# Patient Record
Sex: MALE | Race: WHITE | NOT HISPANIC OR LATINO | Employment: OTHER | URBAN - METROPOLITAN AREA
[De-identification: names, ages, dates, MRNs, and addresses within clinical notes are randomized per-mention and may not be internally consistent; named-entity substitution may affect disease eponyms.]

---

## 2017-01-10 ENCOUNTER — TELEPHONE (OUTPATIENT)
Dept: TRANSPLANT | Facility: CLINIC | Age: 57
End: 2017-01-10

## 2017-01-10 DIAGNOSIS — Z76.82 LUNG TRANSPLANT CANDIDATE: ICD-10-CM

## 2017-01-10 DIAGNOSIS — D86.9 SARCOIDOSIS: Primary | ICD-10-CM

## 2017-01-10 NOTE — LETTER
01/11/2017    Florentin Corona  [address]  [address]  [facility] [address]  Phone: [phone]  Fax: [phone]         Dear Dr. Florentin Corona    Patient: Martin Hendrix Jr.     MR Number: [id number]     YOB: 1960       Thank you for the referral of Martin Hendrix Jr. to our lung transplant program. An initial appointment with the transplant team has been scheduled for February 6, 2017. You will receive an after-visit summary following the completion of your patients appointment in our clinic.    Thank you again for your trust in our program. If there is anything we can do for you or your staff, please feel free to contact us at [phone].    Sincerely,         Darrick Wolfe MD   Director, Lung Transplantation   Pulmonary & Critical Care Medicine    [facility]  [address] [place] [id number]  [phone]

## 2017-01-10 NOTE — TELEPHONE ENCOUNTER
Dr. Corona's office has been trying to fax a referral since November.  We have not received it.  Vera stated that she has been trying to send it through the charting system.  Asked her to print out the needed info and manually fax it.  Received referral today.  Placed on Dr. Wolfe's desk for review.

## 2017-01-10 NOTE — TELEPHONE ENCOUNTER
Notified patient that records were received today and that we will submit to insurance for consult clearance.  Once that is obtained, I will be in touch with him regarding scheduling.  Patient was asked to obtain a CD of his CT scan done 11/3/2016 at [place] to bring with him to his consult appointment.  He stated that he would obtain it and bring it with him.

## 2017-02-06 ENCOUNTER — HOSPITAL ENCOUNTER (OUTPATIENT)
Dept: PULMONOLOGY | Facility: CLINIC | Age: 57
Discharge: HOME | End: 2017-02-06
Payer: COMMERCIAL

## 2017-02-06 ENCOUNTER — LAB VISIT (OUTPATIENT)
Dept: LAB | Facility: HOSPITAL | Age: 57
End: 2017-02-06
Attending: INTERNAL MEDICINE
Payer: COMMERCIAL

## 2017-02-06 ENCOUNTER — INITIAL CONSULT (OUTPATIENT)
Dept: TRANSPLANT | Facility: CLINIC | Age: 57
End: 2017-02-06
Payer: COMMERCIAL

## 2017-02-06 VITALS
BODY MASS INDEX: 34.51 KG/M2 | SYSTOLIC BLOOD PRESSURE: 131 MMHG | TEMPERATURE: 97 F | HEART RATE: 111 BPM | DIASTOLIC BLOOD PRESSURE: 83 MMHG | RESPIRATION RATE: 20 BRPM | WEIGHT: 233 LBS | HEIGHT: 69 IN | OXYGEN SATURATION: 92 %

## 2017-02-06 VITALS — WEIGHT: 233 LBS | HEIGHT: 69 IN | BODY MASS INDEX: 34.51 KG/M2

## 2017-02-06 DIAGNOSIS — Z76.82 LUNG TRANSPLANT CANDIDATE: ICD-10-CM

## 2017-02-06 DIAGNOSIS — D86.9 SARCOIDOSIS: ICD-10-CM

## 2017-02-06 DIAGNOSIS — Z76.82 LUNG TRANSPLANT CANDIDATE: Primary | ICD-10-CM

## 2017-02-06 LAB
AMPHET+METHAMPHET UR QL: NEGATIVE
BARBITURATES UR QL SCN>200 NG/ML: NEGATIVE
BENZODIAZ UR QL SCN>200 NG/ML: NEGATIVE
BZE UR QL SCN: NEGATIVE
CANNABINOIDS UR QL SCN: NEGATIVE
CREAT UR-MCNC: 148 MG/DL
ETHANOL UR-MCNC: <10 MG/DL
METHADONE UR QL SCN>300 NG/ML: NEGATIVE
OPIATES UR QL SCN: NEGATIVE
PCP UR QL SCN>25 NG/ML: NEGATIVE
PRE FEV1 FVC: 59
PRE FEV1: 1.05
PRE FVC: 1.78
PREDICTED FEV1 FVC: 81
PREDICTED FEV1: 3.57
PREDICTED FVC: 4.39
TOXICOLOGY INFORMATION: NORMAL

## 2017-02-06 PROCEDURE — 99999 PR PBB SHADOW E&M-EST. PATIENT-LVL III: CPT | Mod: PBBFAC,,, | Performed by: INTERNAL MEDICINE

## 2017-02-06 PROCEDURE — 94729 DIFFUSING CAPACITY: CPT | Mod: S$GLB,,, | Performed by: INTERNAL MEDICINE

## 2017-02-06 PROCEDURE — 80307 DRUG TEST PRSMV CHEM ANLYZR: CPT

## 2017-02-06 PROCEDURE — 80323 ALKALOIDS NOS: CPT

## 2017-02-06 PROCEDURE — 94727 GAS DIL/WSHOT DETER LNG VOL: CPT | Mod: S$GLB,,, | Performed by: INTERNAL MEDICINE

## 2017-02-06 PROCEDURE — 99203 OFFICE O/P NEW LOW 30 MIN: CPT | Mod: 25,S$GLB,, | Performed by: INTERNAL MEDICINE

## 2017-02-06 PROCEDURE — 94620 PR PULMONARY STRESS TESTING,SIMPLE: CPT | Mod: S$GLB,,, | Performed by: INTERNAL MEDICINE

## 2017-02-06 PROCEDURE — 94010 BREATHING CAPACITY TEST: CPT | Mod: 59,S$GLB,, | Performed by: INTERNAL MEDICINE

## 2017-02-06 RX ORDER — FLUTICASONE PROPIONATE AND SALMETEROL 500; 50 UG/1; UG/1
1 POWDER RESPIRATORY (INHALATION) 2 TIMES DAILY
Status: ON HOLD | COMMUNITY
End: 2017-08-30 | Stop reason: ALTCHOICE

## 2017-02-06 RX ORDER — ALBUTEROL SULFATE 2.5 MG/.5ML
2.5 SOLUTION RESPIRATORY (INHALATION) 3 TIMES DAILY PRN
Status: ON HOLD | COMMUNITY
Start: 2013-03-29 | End: 2017-08-30 | Stop reason: ALTCHOICE

## 2017-02-06 RX ORDER — FUROSEMIDE 40 MG/1
1 TABLET ORAL DAILY
Refills: 2 | Status: ON HOLD | COMMUNITY
Start: 2017-01-05 | End: 2017-08-30 | Stop reason: ALTCHOICE

## 2017-02-06 RX ORDER — ALBUTEROL SULFATE 90 UG/1
1 AEROSOL, METERED RESPIRATORY (INHALATION) EVERY 6 HOURS PRN
Refills: 11 | Status: ON HOLD | COMMUNITY
Start: 2017-01-12 | End: 2017-08-30 | Stop reason: ALTCHOICE

## 2017-02-06 RX ORDER — ACETAMINOPHEN 325 MG/1
650 TABLET ORAL EVERY 6 HOURS PRN
Status: ON HOLD | COMMUNITY
Start: 2013-03-29 | End: 2017-08-30 | Stop reason: ALTCHOICE

## 2017-02-06 RX ORDER — METHOTREXATE 2.5 MG/1
10 TABLET ORAL WEEKLY
Status: ON HOLD | COMMUNITY
End: 2017-08-30 | Stop reason: ALTCHOICE

## 2017-02-06 RX ORDER — POTASSIUM CHLORIDE 750 MG/1
1 TABLET, EXTENDED RELEASE ORAL DAILY
Refills: 2 | Status: ON HOLD | COMMUNITY
Start: 2017-01-05 | End: 2017-08-30 | Stop reason: ALTCHOICE

## 2017-02-06 RX ORDER — MULTIVITAMIN
1 TABLET ORAL DAILY
Status: ON HOLD | COMMUNITY
End: 2019-03-13 | Stop reason: HOSPADM

## 2017-02-06 NOTE — PROGRESS NOTES
Discussed patient attending support group meeting next week.  Pt is unsure of whether or not he wants to pursue transplant at this time.  I will call him next week to touch base.  Pt was given a pre-transplant education binder and relocation/caregiver requirements were discussed.  Pt states that he had a cscope at [place] by Dr. Gunderson within the last 10 years and the recommendation was to repeat at 10 years.  RANGEL signed to obtain records.

## 2017-02-06 NOTE — LETTER
February 7, 2017        Florentin Corona  [address]  [address]  [facility] [address]  Phone: [phone]  Fax: [phone]             [facility]  [address]  [facility] [address]  Phone: [phone]   Patient: Martin Hendrix Jr.   MR Number: [id number]   YOB: 1960   Date of Visit: 2/6/2017       Dear Dr. Florentin Corona    Thank you for referring Martin Hendrix to me for evaluation. Attached you will find relevant portions of my assessment and plan of care.    If you have questions, please do not hesitate to call me. I look forward to following Martin Hendrix along with you.    Sincerely,    Darrick Wolfe MD    Enclosure    If you would like to receive this communication electronically, please contact [email or web address] or [phone] to request [employer] access.    [employer] is a tool which provides read-only access to select patient information with whom you have a relationship. Its easy to use and provides real time access to review your patients record including encounter summaries, notes, results, and demographic information.    If you feel you have received this communication in error or would no longer like to receive these types of communications, please e-mail [email or web address]

## 2017-02-06 NOTE — PROCEDURES
Martin Hendrix Jr. is a 56 y.o.  male patient, who presents for a 6 minute walk test ordered by MD. Yaneth.  The diagnosis is Pre Lung Transplant Evaluation.  The patient's BMI is 35 kg/m2.  Predicted distance (lower limit of normal) is 415.89 meters.      Test Results:    The test was completed without stopping. The total time walked was 360 seconds.  During walking, the patient reported:  Dyspnea. The patient used no assistive devices during testing.     02/06/2017---------Distance: 329.18 meters (1080 feet)     O2 Sat % Supplemental Oxygen Heart Rate Blood Pressure Eugenio Scale   Pre-exercise  (Resting) 95 % 6 L/M 109 bpm 121/88 mmHg 0   During Exercise 80 % 6 L/M 146 bpm 133/88 mmHg 4   Post-exercise  (Recovery) 93 % 6 L/M  122 bpm   mmHg       Recovery Time: 195 seconds    Performing nurse/tech: TOO Monzon      PREVIOUS STUDY:   The patient has not had a previous study.      CLINICAL INTERPRETATION:  Six minute walk distance is 329.18 meters (1080 feet) with somewhat heavy dyspnea.  During exercise, there was significant desaturation while breathing supplemental oxygen.  Blood pressure remained stable and Heart rate increased significantly with walking.  Tachycardia was present prior to exercise.  This may represent a tachycardic response to exercise.  The patient did not report non-pulmonary symptoms during exercise.  No previous study performed.  Based upon age and body mass index, exercise capacity is less than predicted.

## 2017-02-06 NOTE — PROGRESS NOTES
LUNG TRANSPLANT INITIAL EVALUATION                                                                                                                                             Reason for Visit:  Evaluation for lung transplant    Referring Physician: Florentin Corona MD    History of Present Illness: Martin Hendrix Jr. is a 56 y.o. male with past medical history of sarcoidosis, KRISTYN, and pulmonary hypertension.  He is on 3L of oxygen.  He is on BIPAP.  His New York Heart Association Class is III  and a Karnofsky score of 70. Cares for self; unable to carry on normal activity or active work.  He is not diabetic.     Previous work up review  Sarcoidosis  PFTs:  FEV1 30%, TLC 47%, and DLCO 44%   Diagnosed in 2004 by biopsy   Currently on  On MTX 10 mg/week for long time, Advair bid, and Albuterol prn  On oxygen for last 2 months (worsening SOB)    Pulmonary HTN  TTE done on November 2016 shoed systolic PAP of 96 mmHg, EF 40-45%, RV mod-severe dilate, and diastolic dysfunction grade III.  No current treatment.  Lasix for supportive    KRISTYN AHI 9 with taina sat 74%  On BPAP at home with oxygen 5 LPMs    Flu shot and pneumonia shot done    RHC/LHC: not done    Never smoking    No past medical history on file.  Colonoscopy Results: done in the past no result with him today    No past surgical history on file.  [place]: None    Blood Product Transfusions: no    Allergies: Review of patient's allergies indicates not on file.    No current outpatient prescriptions on file.     No current facility-administered medications for this visit.          There is no immunization history on file for this patient.  Family History:  No family history on file.  History   Alcohol use Not on file      History   Drug Use Not on file      Social History     Social History    Marital status:      Spouse name: N/A    Number of children: N/A    Years of education: N/A     Occupational History    Not on file.     Social History Main  "Topics    Smoking status: Not on file    Smokeless tobacco: Not on file    Alcohol use Not on file    Drug use: Not on file    Sexual activity: Not on file     Other Topics Concern    Not on file     Social History Narrative     Review of Systems   Constitutional: Negative for chills and fever.   HENT: Negative for hearing loss.    Eyes: Negative for blurred vision, double vision, photophobia and pain.   Respiratory: Positive for cough. Negative for hemoptysis, sputum production, shortness of breath and wheezing.    Cardiovascular: Positive for orthopnea, leg swelling and PND. Negative for chest pain and palpitations.   Gastrointestinal: Negative for heartburn, nausea and vomiting.   Genitourinary: Negative for dysuria and urgency.   Skin: Negative for itching and rash.   Neurological: Negative for dizziness, tingling and headaches.   Endo/Heme/Allergies: Negative for environmental allergies. Does not bruise/bleed easily.   Psychiatric/Behavioral: Negative for depression, memory loss and suicidal ideas. The patient does not have insomnia.      Vitals  Visit Vitals    /83 (BP Location: Left arm, Patient Position: Sitting, BP Method: Automatic)    Pulse (!) 111    Temp 97.3 °F (36.3 °C) (Oral)    Resp 20    Ht 5' 9" (1.753 m)    Wt 105.7 kg (233 lb)    SpO2 (!) 92%    BMI 34.41 kg/m2     Physical Exam   Constitutional: He is oriented to person, place, and time and well-developed, well-nourished, and in no distress. No distress.   HENT:   Head: Normocephalic and atraumatic.   Eyes: Conjunctivae are normal. Pupils are equal, round, and reactive to light.   Neck: Normal range of motion. Neck supple. No tracheal deviation present. No thyromegaly present.   Cardiovascular: Normal rate, regular rhythm, normal heart sounds and intact distal pulses.    Loud P2 positive   Abdominal: Bowel sounds are normal.   Musculoskeletal: He exhibits edema.   Neurological: He is alert and oriented to person, place, and " time. No cranial nerve deficit. Gait normal. GCS score is 15.   Skin: Skin is warm and dry. He is not diaphoretic.       Labs:  Lab Visit on 02/06/2017   Component Date Value    Alcohol, Urine 02/06/2017 <10     Benzodiazepines 02/06/2017 Negative     Methadone metabolites 02/06/2017 Negative     Cocaine (Metab.) 02/06/2017 Negative     Opiate Scrn, Ur 02/06/2017 Negative     Barbiturate Screen, Ur 02/06/2017 Negative     Amphetamine Screen, Ur 02/06/2017 Negative     THC 02/06/2017 Negative     Phencyclidine 02/06/2017 Negative     Creatinine, Random Ur 02/06/2017 148.0     Toxicology Information 02/06/2017 SEE COMMENT        No flowsheet data found.  Imaging:  CT chest result from OSH   1. Resolution of right upper lobe centrilobular nodules.  2. Scattered bilateral groundglass opacities which may reflect air trapping or pneumonitis. No other detrimental change.  3. Calcified mediastinal and hilar lymph nodes presumably related to patient's history of sarcoidosis.  4. Stable areas of parenchymal and interstitial scarring.  5. Enlargement of the main pulmonary artery which can be seen with pulmonary arterial hypertension.    Cardiodiagnostics:  TTE: OSH    Interpretation Summary  Ejection Fraction = 40-45%.  RSVP= 96 mm/hg  Left ventricular systolic function is mildly reduced.  A variety of Doppler measurements indicate impaired left ventricular  relaxation, which is associated with grade I/IV or mild diastolic dysfunction.  The right ventricle is moderate to severely dilated.  There is mild mitral regurgitation.  Right ventricular systolic pressure is elevated at >60mmHg.    Assessment:  1. Lung transplant candidate    2. Sarcoidosis    3. Secondary pulmonary hypertension        2-6-2017  6MWT:  329.4 M (1080 F)  Resting oxygen sat 95% on oxygen Desatuartion to 80% during walk   Recovery time 195 sec  HR  146/min end of walk  PFTs:  FEV1 30%, TLC 47%, and DLCO 44%     Plan:     1. Patient  is candidate for the lung transplant base on   Sarcoidosis with severe restrictive/obstructive lung disease  PH related with sarcoidosis  - Will work him up for lung transplant  - Send for support group   - SW evaluation    2. Sarcoidosis  On MTX, Advair, and albuterol will continue    3. Severe pulmonary hypertension Fc III  Likely group 5 from sarcoidosis  Plan   - Send to follow up with pulmonary HTN service   - He may need to try treatment with Sildenafil or other medication    - May need more cardiac work up with R/LHC    4. KRISTYN   On BiPAP and oxygen will continue    5. Follow up in 3 months    Tristin Kenny MD    Pulmonary Critical Care Fellow   [facility] PGY-VI  Pager [phone]    Attending Note:    I have seen and evaluated the patient with the fellow. Their note reflects the content of our discussion and my plan of care. Will start workup for lung transplant. Encouraged to lose weight. I would advise a trial of sildenafil or tadalafil for his PH. RTC during the workup.    Thank you for allowing me to participate in the care of your patient. Please, do not hesitate to contact me if you have any questions.      Darrick Wolfe MD  Medical Director of Lung Transplantation  [facility]

## 2017-02-08 ENCOUNTER — TELEPHONE (OUTPATIENT)
Dept: TRANSPLANT | Facility: CLINIC | Age: 57
End: 2017-02-08

## 2017-02-08 DIAGNOSIS — I27.9 CHRONIC PULMONARY HEART DISEASE: ICD-10-CM

## 2017-02-08 DIAGNOSIS — R06.82 TACHYPNEA: ICD-10-CM

## 2017-02-08 DIAGNOSIS — Z79.899 POLYPHARMACY: Primary | ICD-10-CM

## 2017-02-08 LAB
ANABASINE UR-MCNC: <2 NG/ML
COTININE UR-MCNC: <5 NG/ML
NICOTINE UR-MCNC: <2 NG/ML
NORNICOTINE UR-MCNC: <2 NG/ML

## 2017-02-08 NOTE — TELEPHONE ENCOUNTER
Notified patient that Dr. Wolfe and Dr. Higuera discussed the patient and would like for him to have a R and LHC and then meet with Dr. Higuera afterwards to discuss possibly PH therapy.  Explained that he would meet with Dr. Sharma on 2/14, schedule his heart cath, and then depending on when he has his heart cath, will see Dr. Higuera soon afterwards.  Pt verbalized understanding.

## 2017-02-08 NOTE — TELEPHONE ENCOUNTER
----- Message from Darrick Wolfe MD sent at 2/7/2017  3:03 PM CST -----  Sounds good. Thanks    ----- Message -----     From: Shanell Higuera MD     Sent: 2/7/2017   2:49 PM       To: Darrick Wolfe MD, #    Gaurang Cj called me about this santhosh today    Lets go ahead and get the R/LHC and once I have hemodynamics I can start him on pulm VD tx. Will ask my PH team to put him on my books for clinic following the cath    S

## 2017-02-13 DIAGNOSIS — Z76.82 LUNG TRANSPLANT CANDIDATE: Primary | ICD-10-CM

## 2017-02-14 ENCOUNTER — INITIAL CONSULT (OUTPATIENT)
Dept: CARDIOLOGY | Facility: CLINIC | Age: 57
End: 2017-02-14
Payer: COMMERCIAL

## 2017-02-14 ENCOUNTER — DOCUMENTATION ONLY (OUTPATIENT)
Dept: CARDIOLOGY | Facility: CLINIC | Age: 57
End: 2017-02-14

## 2017-02-14 VITALS
OXYGEN SATURATION: 93 % | BODY MASS INDEX: 32.53 KG/M2 | HEART RATE: 99 BPM | HEIGHT: 71 IN | WEIGHT: 232.38 LBS | DIASTOLIC BLOOD PRESSURE: 87 MMHG | SYSTOLIC BLOOD PRESSURE: 135 MMHG

## 2017-02-14 DIAGNOSIS — D86.9 SARCOIDOSIS: ICD-10-CM

## 2017-02-14 DIAGNOSIS — N39.0 URINARY TRACT INFECTION WITHOUT HEMATURIA, SITE UNSPECIFIED: Primary | ICD-10-CM

## 2017-02-14 DIAGNOSIS — Z76.82 LUNG TRANSPLANT CANDIDATE: ICD-10-CM

## 2017-02-14 DIAGNOSIS — J96.11 CHRONIC RESPIRATORY FAILURE WITH HYPOXIA: Primary | ICD-10-CM

## 2017-02-14 PROCEDURE — 99204 OFFICE O/P NEW MOD 45 MIN: CPT | Mod: S$GLB,,, | Performed by: INTERNAL MEDICINE

## 2017-02-14 PROCEDURE — 99999 PR PBB SHADOW E&M-EST. PATIENT-LVL III: CPT | Mod: PBBFAC,,, | Performed by: INTERNAL MEDICINE

## 2017-02-14 RX ORDER — DIPHENHYDRAMINE HCL 25 MG
50 CAPSULE ORAL ONCE
Status: CANCELLED | OUTPATIENT
Start: 2017-02-14 | End: 2017-02-14

## 2017-02-14 RX ORDER — SODIUM CHLORIDE 9 MG/ML
3 INJECTION, SOLUTION INTRAVENOUS CONTINUOUS
Status: CANCELLED | OUTPATIENT
Start: 2017-02-14 | End: 2017-02-14

## 2017-02-14 NOTE — PROGRESS NOTES
Interventional Cardiology Consult Note                                                                                                                                       Referring Physician: Dr. Wolfe  Reason for Consult:  RHC/LHC as part of lung transplant eval    History of Present Illness: Patient is a pleasant 56 year old male with past medical history significant for sarcoidosis, KRISTYN, and pulmonary hypertension who is referred by Dr. Wolfe for RHC/LHC as part of transplant evaluation.  Patient currently on 3 L NC, feels SOB has been worsening over past several months.  Denies any chest pain, denies syncope or lightheadedness.  Does complain of MARTINEZ that has been worsening.  Complains of lower extremity edema.    11/2016 TTE   EF = 40-45%.  RSVP= 96 mm/hg  Left ventricular systolic function is mildly reduced.  There is mild mitral regurgitation.  Right ventricular systolic pressure is elevated at >60mmHg.    Past Medical History   Diagnosis Date    KRISTYN on CPAP     Osteopenia     Pulmonary hypertension     Sarcoidosis     Shortness of breath        Past Surgical History   Procedure Laterality Date    Abdominal surgery       6 weeks old    Cholecystectomy      Lung biopsy      Chest tube insertion      Bronchoscopy         Allergies: Review of patient's allergies indicates no known allergies.    Current Outpatient Prescriptions   Medication Sig    acetaminophen (TYLENOL) 325 MG tablet Take 650 mg by mouth every 6 (six) hours as needed.    albuterol sulfate 2.5 mg/0.5 mL Nebu Inhale 2.5 mg into the lungs 3 (three) times daily as needed.    fluticasone-salmeterol 500-50 mcg/dose (ADVAIR DISKUS) 500-50 mcg/dose DsDv diskus inhaler Inhale 1 puff into the lungs 2 (two) times daily.    furosemide (LASIX) 40 MG tablet Take 1 tablet by mouth once daily.    methotrexate 2.5 MG Tab Take 10 mg by mouth once a week.    multivitamin (THERAGRAN) per tablet Take 1 tablet by mouth once daily.    potassium  "chloride SA (K-DUR,KLOR-CON) 10 MEQ tablet Take 1 tablet by mouth once daily.    PROAIR HFA 90 mcg/actuation inhaler Inhale 1 puff into the lungs every 6 (six) hours as needed.     No current facility-administered medications for this visit.          There is no immunization history on file for this patient.  Family History:    Family History   Problem Relation Age of Onset    Hypertension Mother     Diabetes Father     Kidney disease Sister     Diabetes Brother      History   Alcohol Use No      History   Drug Use No      Social History     Social History    Marital status:      Spouse name: N/A    Number of children: N/A    Years of education: N/A     Occupational History    Not on file.     Social History Main Topics    Smoking status: Never Smoker    Smokeless tobacco: Not on file    Alcohol use No    Drug use: No    Sexual activity: Not on file     Other Topics Concern    Not on file     Social History Narrative     Review of Systems   Constitutional: Negative for chills and fever.   HENT: Negative for hearing loss.    Eyes: Negative for blurred vision, double vision, photophobia and pain.   Respiratory: Positive for cough. Negative for hemoptysis, sputum production, shortness of breath and wheezing.    Cardiovascular: Positive for orthopnea, leg swelling and PND. Negative for chest pain, palpitations and claudication.   Gastrointestinal: Negative for heartburn, nausea and vomiting.   Genitourinary: Negative for dysuria and urgency.   Skin: Negative for itching and rash.   Neurological: Negative for dizziness, tingling, focal weakness and headaches.   Endo/Heme/Allergies: Negative for environmental allergies. Does not bruise/bleed easily.   Psychiatric/Behavioral: Negative for depression.     Vitals  Visit Vitals    /87  Comment: LT    Pulse 99    Ht 5' 11" (1.803 m)    Wt 105.4 kg (232 lb 5.8 oz)    SpO2 (!) 93%    BMI 32.41 kg/m2     Physical Exam   Constitutional: He is " oriented to person, place, and time and well-developed, well-nourished, and in no distress. No distress.   HENT:   Head: Normocephalic and atraumatic.   Eyes: Conjunctivae are normal. Pupils are equal, round, and reactive to light.   Neck: Normal range of motion. Neck supple. No tracheal deviation present. No thyromegaly present.   Cardiovascular: Normal rate, regular rhythm, normal heart sounds and intact distal pulses.    Pulmonary/Chest: He has wheezes.   Abdominal: Bowel sounds are normal.   Musculoskeletal: He exhibits edema.   Neurological: He is alert and oriented to person, place, and time. No cranial nerve deficit. Gait normal. GCS score is 15.   Skin: Skin is warm and dry. He is not diaphoretic.         Assessment/Plan:  1. Lung transplant candidate - LHC/RHC as part of evaluation for transplant.  Right radial access and right brachial access.  Patient understands risks and benefits and gives consent.   2. secondary pulmonary hypertension - Group 5 PH secondary to sarcoidosis, scheduled to see Dr. Higuera   3. Sarcoidosis      Plan:  RHC: 18 gauge antecubital vein IV for RHC  [place]  Patient will need vasoreactivity testing     LHC:  Right radial access  Presley catheter  Pigtail catheter for LV gram  J wire

## 2017-02-14 NOTE — LETTER
February 16, 2017      Shanell Higuera MD  [address]  [facility] [address]           [facility]  [address]  [facility] [address]  Phone: [phone]          Patient: Martin Hendrix Jr.   MR Number: [id number]   YOB: 1960   Date of Visit: 2/14/2017       Dear Dr. Shanell Higuera:    Thank you for referring Martin Hendrix to me for evaluation. Attached you will find relevant portions of my assessment and plan of care.    If you have questions, please do not hesitate to call me. I look forward to following Martin Hendrix along with you.    Sincerely,    Sukhi Sharma MD    Enclosure  CC:  No Recipients    If you would like to receive this communication electronically, please contact [email or web address] or [phone] to request more information on [employer] access.    For providers and/or their staff who would like to refer a patient to [facility], please contact us through our one-stop-shop provider referral line, [facility], at [phone].    If you feel you have received this communication in error or would no longer like to receive these types of communications, please e-mail [email or web address]

## 2017-02-14 NOTE — PROGRESS NOTES
OUTPATIENT CATHETERIZATION INSTRUCTIONS    You have been scheduled for a procedure in the catheterization lab on Friday, February 17, 2017.     Please report to the Cardiology Waiting Area on the Third floor of the hospital and check in at 9 AM.   You will then be taken to the SSCU (Short Stay Cardiac Unit) and prepared for your procedure. Please be aware that this is not the time of your procedure but the time you are to arrive. The procedures are scheduled on an hourly basis; however, emergency cases take precedence over all other cases.       You may not have anything to eat or drink after midnight the night before your test. You may take your regular morning medications with water. If there are any medications that you should not take you will be instructed to hold them that morning. If you are diabetic and on Metformin (Glucophage) do not take it the day before, the day of, and for 2 days after your procedure.      The procedure will take 1-2 hours to perform. After the procedure, you will return to SSCU on the third floor of the hospital. You will need to lie still (or keep your arm still) for the next 4 to 6 hours to minimize bleeding from the puncture site. Your family may remain in the room with you during this time.       You may be able to be discharged home that same afternoon if there is someone to drive you home and there were no complications. If you have one of the balloon, stent, or device procedures you may spend the night in the hospital. Your doctor will determine, based on your progress, the date and time of your discharge. The results of your procedure will be discussed with you before you are discharged. Any further testing or procedures will be scheduled for you either before you leave or you will be called with these appointments.       If you should have any questions, concerns, or need to change the date of your procedure, please call  EVELIO Aguirre @ [phone]    Special  Instructions:  Drink plenty of water the day before procedure        THE ABOVE INSTRUCTIONS WERE GIVEN TO THE PATIENT VERBALLY AND THEY VERBALIZED UNDERSTANDING.  THEY DO NOT REQUIRE ANY SPECIAL NEEDS AND DO NOT HAVE ANY LEARNING BARRIERS.          Directions for Reporting to Cardiology Waiting Area in the Hospital  If you park in the Parking Garage:  Take elevators to the1st floor of the parking garage.  Continue past the gift shop, coffee shop, and piano.  Take a right and go to the gold elevators. (Elevator B)  Take the elevator to the 3rd floor.  Follow the arrow on the sign on the wall that says Cath Lab Registration/EP Lab Registration.  Follow the long hallway all the way around until you come to a big open area.  This is the registration area.  Check in at Reception Desk.    OR    If family is dropping you off:  Have them drop you off at the front of the Hospital under the green overhang.  Enter through the doors and take a right.  Take the E elevators to the 3rd floor Cardiology Waiting Area.  Check in at the Reception Desk in the waiting room.

## 2017-02-15 NOTE — PATIENT INSTRUCTIONS
Heart Disease Education    The heart beats 60 to 100 times per minute, 24 hours a day. This equals almost 1000,000 times a day. It pumps blood with oxygen and nutrients to the tissues and organs of the body. But the heart is a muscle and needs its own supply of blood. Blood flow to the heart is supplied by the coronary arteries. Coronary artery disease (atherosclerosis) is a result of cholesterol, saturated fat, and calcium deposits (plaques) that build up inside the walls. This causes inflammation within the coronary arteries. These plaques narrow the artery and reduce blood flow to the heart muscle. The reduction in blood flow to the heart muscle decreases oxygen supply to the heart. If the narrowing is significant enough, the oxygen supply to one or more regions of the heart can be temporarily or permanently shut down. This can cause chest pain, and possibly death of heart tissue (heart attack).  Types of chest pain  Angina is the name for pain in the heart muscle. Angina is a warning sign of serious heart disease. When untreated it can lead to a heart attack, also known as acute myocardial infarction, or AMI. Angina occurs when there is not enough blood and oxygen flowing to the heart for the amount of work it is doing. This most often happens during physical exertion, when the heart is working hardest. It is usually relieved by rest or nitroglycerin. Angina may also occur after a large meal when extra blood is sent to the digestive organs and less goes to the heart. In the case of advanced or unstable heart disease, angina can occur at rest or awaken you from sleep. Angina usually lasts from a few minutes up to 20 minutes or more. When treated early, the effects of angina can be reversed without permanent damage to the heart. Angina is a serious condition and needs to be evaluated by a medical professional immediately.  There are two types of angina -- stable and unstable:  · Stable angina usually occurs  with a predictable level of activity. Being stable, its character, severity, and occurrence do not change much over time. It usually starts with activity, and resolves with rest or taking your medicine as instructed by your doctor. The symptoms usually do not last long.  · Unstable angina changes or gets worse over time. It is different from whatever you are used to. It may feel different or worse, begin without cause, occur with exercise or exertion, wake you up from sleep, and last longer. It may not respond in the same way as it does when you take your usual medicines for an attack. This type of angina can be a warning sign of an impending heart attack.     A heart attack is usually the result of a blood clot that suddenly forms in a coronary artery that has been narrowed with plaque. When this occurs, blood flow may be cut off to a part of the heart muscle, causing the cells to die. This weakens the pumping action of the heart, which affects the delivery of blood to all the other organs in the body including the brain. This damage is not reversible. However, early treatment can limit the amount of damage.  The pain you feel with angina and a heart attack may have a similar quality. However, it is usually different in intensity and duration. Here are some typical descriptions of a heart attack:  · It is most often experienced as a squeezing, crushing, pressure-like sensation in the center of the chest.  · It is sometimes described as something heavy sitting on my chest.  · It may feel more like a bad case of indigestion.  · The pain may spread from the chest to the arm, shoulder, throat or jaw.  · Sometimes the pain is not felt in the chest at all, but only in the arm, shoulder, throat or jaw.  · There may also be nausea, vomiting, dizziness or light-headedness, sweating and trouble breathing.  · Palpitations, or your heart beating rapidly  · A new, irregular heart beat  · Unexplained weakness  You may not be  "able to tell the difference between "bad" angina and a heart attack at home. Seek help if your symptoms are different than usual. Do not be in denial or just try to "tough it out."  Call 911  This is the fastest and safest way to get to the emergency department. The paramedics can also start treatment on the way to the hospital, saving valuable time for your heart.  · If the angina gets worse, if it continues, or if it stops and returns, call 911 immediately. Do not delay. You may be having a heart attack.  · After you call 911, take a second tablet or spray unless instructed otherwise. When repeating doses, sit down if possible, because it can make you feel lightheaded or dizzy. Wait another 5 minutes. If the angina still does not go away, take a third tablet or spray. Do not take more than 3 tablets or sprays within 15 minutes. Stay on the phone with 911 for further instruction.  · Your healthcare provider may give you slightly different instructions than those above. If so, follow them carefully.  Do not wait until symptoms become severe to call 911.  Other reasons to call 911 include:  · Trouble breathing  · Feeling lightheaded, faint, or dizzy  · Rapid heart beat  · Slower than usual heart rate compared to your normal  · Angina with weakness, dizziness, fainting, heavy sweating, nausea, or vomiting  · Extreme drowsiness, confusion  · Weakness of an arm or leg or one side of the face  · Difficulty with speech or vision  When to seek medical care  Remember, the signs and symptoms of a heart attack are not always like they are on TV. Sometimes they are not so obvious. You may only feel weak, or just not right. If it is not clear or if you have any doubt, call for advice.  · Seek help if there is a change in the type of pain, if it feels different, or if your symptoms are mild.  · Do not drive yourself. Have someone else drive you. If no one can drive, call 911.  · Do not delay. Fast diagnosis and treatment can " "prevent or limit the amount of heart damage during a heart attack.  · Do not go to your doctor's office or a clinic as they may not be able to provide all the testing and treatment required for this condition.  · If your doctor has given you medicine to take when symptoms occur, take them but don't delay getting help trying to locate medicines.  What happens in the emergency department  The emergency department is connected to your local emergency medical system (EMS) through 911. That's why during a cardiac emergency, calling 911 is the fastest way to get help. The goal of the emergency department is to rapidly screen, evaluate, and treat people.  Once you are there, an electrocardiogram (ECG or heart tracing) will be done. Blood samples may be taken to look for the presence of heart enzymes that leak from damaged heart cells and show if a heart attack is occurring. You will often be evaluated by a heart specialist (cardiologist) who decides the best course of action. In the case of severe angina or early heart attack, and depending on the circumstances, powerful "clot busting" medicines can be used to dissolve blood clots in the coronary artery. In other cases, you may be taken to a cardiac catheterization lab. Here, a tiny balloon-tipped catheter is advanced through blood vessels to the heart. There the balloon is inflated pushing open the blood vessel restoring blood flow.  Risk factors for heart disease  Risk factors for heart disease are a combination of genetic and lifestyle. Many risk factors work by either directly or indirectly damaging the blood vessels of the heart, or by increasing the risk of forming blood or cholesterol clots, which then clog up and block the arteries.     Examples of physical lifestyle risk factors:  · Cigarette smoking  · High blood pressure  · High blood cholesterol  · Use of stimulant drugs such as cocaine, crack, and amphetamines  · Eating a high-fat, high-cholesterol " meal  · Diabetes   · Obesity which increases risk for diabetes and high blood pressure  · Lack of regular physical activity     Examples of emotional lifestyle factors:  · Chronic high stress levels release stress hormones. These raise blood pressure and cholesterol level and makes blood clot more easily.  · Held-in anger, hostile or cynical attitude  · Social and emotional isolation, lack of intimacy  · Loss of relationship  · Depression  Other factors that increase the risk of heart attack that you cannot control :  · Age. The older you get beyond 40, the greater is your risk of significant coronary artery disease.  · Gender. More men than women get heart disease; but once past menopause, women who are not taking estrogen replacement have the same risk as men for a heart attack.  · Family history. If your mother, father, brother or sister has coronary artery disease, your risk of having it is higher than a person your age without this family history.  What can you do to decrease your risk  To reduce your risk of heart disease:  · Get regular checkups with your doctor.  · Take your medicines for blood pressure, cholesterol or diabetes as directed.  · Watch your diet. Eat a heart healthy diet choosing fresh foods, less salt, cholesterol, and fat  · Stop smoking. Get help if needed.  · Get regular exercise.  · Manage stress.  · Carry a list of medicines and doses in your wallet.  Date Last Reviewed: 12/30/2015  © [employer]. [address] [place] [address], [place] [address]. All rights reserved. This information is not intended as a substitute for professional medical care. Always follow your healthcare professional's instructions.

## 2017-02-17 ENCOUNTER — HOSPITAL ENCOUNTER (OUTPATIENT)
Facility: HOSPITAL | Age: 57
Discharge: HOME | End: 2017-02-17
Attending: INTERNAL MEDICINE | Admitting: INTERNAL MEDICINE
Payer: COMMERCIAL

## 2017-02-17 VITALS
OXYGEN SATURATION: 97 % | SYSTOLIC BLOOD PRESSURE: 138 MMHG | HEIGHT: 71 IN | RESPIRATION RATE: 22 BRPM | HEART RATE: 96 BPM | TEMPERATURE: 99 F | BODY MASS INDEX: 32.2 KG/M2 | DIASTOLIC BLOOD PRESSURE: 88 MMHG | WEIGHT: 230 LBS

## 2017-02-17 DIAGNOSIS — J96.11 CHRONIC RESPIRATORY FAILURE WITH HYPOXIA: Primary | ICD-10-CM

## 2017-02-17 DIAGNOSIS — Z76.82 LUNG TRANSPLANT CANDIDATE: ICD-10-CM

## 2017-02-17 DIAGNOSIS — D86.9 SARCOIDOSIS: ICD-10-CM

## 2017-02-17 LAB
ABO + RH BLD: NORMAL
ANION GAP SERPL CALC-SCNC: 6 MMOL/L
BASOPHILS # BLD AUTO: 0.06 K/UL
BASOPHILS NFR BLD: 0.6 %
BLD GP AB SCN CELLS X3 SERPL QL: NORMAL
BUN SERPL-MCNC: 12 MG/DL
CALCIUM SERPL-MCNC: 9 MG/DL
CHLORIDE SERPL-SCNC: 104 MMOL/L
CO2 SERPL-SCNC: 30 MMOL/L
CREAT SERPL-MCNC: 0.8 MG/DL
DIFFERENTIAL METHOD: ABNORMAL
EOSINOPHIL # BLD AUTO: 0.2 K/UL
EOSINOPHIL NFR BLD: 1.5 %
ERYTHROCYTE [DISTWIDTH] IN BLOOD BY AUTOMATED COUNT: 13.9 %
EST. GFR  (AFRICAN AMERICAN): >60 ML/MIN/1.73 M^2
EST. GFR  (NON AFRICAN AMERICAN): >60 ML/MIN/1.73 M^2
GLUCOSE SERPL-MCNC: 150 MG/DL
HCT VFR BLD AUTO: 48.2 %
HGB BLD-MCNC: 15.1 G/DL
LYMPHOCYTES # BLD AUTO: 0.6 K/UL
LYMPHOCYTES NFR BLD: 5.3 %
MCH RBC QN AUTO: 30.9 PG
MCHC RBC AUTO-ENTMCNC: 31.3 %
MCV RBC AUTO: 99 FL
MONOCYTES # BLD AUTO: 0.8 K/UL
MONOCYTES NFR BLD: 7.3 %
NEUTROPHILS # BLD AUTO: 9.1 K/UL
NEUTROPHILS NFR BLD: 85 %
PLATELET # BLD AUTO: 333 K/UL
PMV BLD AUTO: 9.5 FL
POTASSIUM SERPL-SCNC: 4.4 MMOL/L
RBC # BLD AUTO: 4.88 M/UL
SODIUM SERPL-SCNC: 140 MMOL/L
WBC # BLD AUTO: 10.65 K/UL

## 2017-02-17 PROCEDURE — 93460 R&L HRT ART/VENTRICLE ANGIO: CPT

## 2017-02-17 PROCEDURE — 85025 COMPLETE CBC W/AUTO DIFF WBC: CPT

## 2017-02-17 PROCEDURE — 93460 R&L HRT ART/VENTRICLE ANGIO: CPT | Mod: 26,,, | Performed by: INTERNAL MEDICINE

## 2017-02-17 PROCEDURE — 63600175 PHARM REV CODE 636 W HCPCS

## 2017-02-17 PROCEDURE — 99900035 HC TECH TIME PER 15 MIN (STAT)

## 2017-02-17 PROCEDURE — 25000003 PHARM REV CODE 250: Performed by: INTERNAL MEDICINE

## 2017-02-17 PROCEDURE — 93010 ELECTROCARDIOGRAM REPORT: CPT | Mod: ,,, | Performed by: INTERNAL MEDICINE

## 2017-02-17 PROCEDURE — 80048 BASIC METABOLIC PNL TOTAL CA: CPT

## 2017-02-17 PROCEDURE — 93463 DRUG ADMIN & HEMODYNMIC MEAS: CPT

## 2017-02-17 PROCEDURE — 86900 BLOOD TYPING SEROLOGIC ABO: CPT

## 2017-02-17 PROCEDURE — 86850 RBC ANTIBODY SCREEN: CPT

## 2017-02-17 PROCEDURE — 25000003 PHARM REV CODE 250

## 2017-02-17 PROCEDURE — 93005 ELECTROCARDIOGRAM TRACING: CPT

## 2017-02-17 RX ORDER — SODIUM CHLORIDE 9 MG/ML
1.5 INJECTION, SOLUTION INTRAVENOUS CONTINUOUS
Status: ACTIVE | OUTPATIENT
Start: 2017-02-17 | End: 2017-02-17

## 2017-02-17 RX ORDER — SODIUM CHLORIDE 9 MG/ML
3 INJECTION, SOLUTION INTRAVENOUS CONTINUOUS
Status: ACTIVE | OUTPATIENT
Start: 2017-02-17 | End: 2017-02-17

## 2017-02-17 RX ORDER — DIPHENHYDRAMINE HCL 50 MG
50 CAPSULE ORAL ONCE
Status: COMPLETED | OUTPATIENT
Start: 2017-02-17 | End: 2017-02-17

## 2017-02-17 RX ADMIN — DIPHENHYDRAMINE HYDROCHLORIDE 50 MG: 50 CAPSULE ORAL at 09:02

## 2017-02-17 RX ADMIN — SODIUM CHLORIDE 1.5 ML/KG/HR: 0.9 INJECTION, SOLUTION INTRAVENOUS at 01:02

## 2017-02-17 RX ADMIN — SODIUM CHLORIDE 3 ML/KG/HR: 0.9 INJECTION, SOLUTION INTRAVENOUS at 09:02

## 2017-02-17 NOTE — DISCHARGE SUMMARY
[facility]  Discharge Summary      Admit Date: 2/17/2017    Discharge Date and Time:  02/17/2017 3:54 PM    Attending Physician: Sukhi Sharma MD     Reason for Admission: LHC/RHC as part of pre-transplant Eval    Procedures Performed: Procedure(s) (LRB):  HEART CATH-BILATERAL (N/A)    Hospital Course Patient is a pleasant 56 year old male with past medical history significant for sarcoidosis, KRISTYN, and pulmonary hypertension who is referred by Dr. Wolfe for RHC/LHC as part of transplant evaluation. Patient currently on 3 L NC.  Please see cath report for full summary.         Consults: none    Significant Diagnostic Studies: Labs:   BMP:   Recent Labs  Lab 02/17/17  0838   *      K 4.4      CO2 30*   BUN 12   CREATININE 0.8   CALCIUM 9.0       Final Diagnoses:    Principal Problem: Sarcoidosis   Secondary Diagnoses:   Active Hospital Problems    Diagnosis  POA    *Sarcoidosis [D86.9]  Yes    Chronic respiratory failure with hypoxia [J96.11]  Yes    Secondary pulmonary hypertension [I27.2]  Yes      Resolved Hospital Problems    Diagnosis Date Resolved POA   No resolved problems to display.       Discharged Condition: good    Disposition: Home or Self Care    Follow Up/Patient Instructions: Cardiac diet.  F/U appt scheduled with Dr. Wolfe, and Dr. Higuera  Medications:  Reconciled Home Medications:   Current Discharge Medication List      CONTINUE these medications which have NOT CHANGED    Details   fluticasone-salmeterol 500-50 mcg/dose (ADVAIR DISKUS) 500-50 mcg/dose DsDv diskus inhaler Inhale 1 puff into the lungs 2 (two) times daily.      furosemide (LASIX) 40 MG tablet Take 1 tablet by mouth once daily.  Refills: 2      multivitamin (THERAGRAN) per tablet Take 1 tablet by mouth once daily.      potassium chloride SA (K-DUR,KLOR-CON) 10 MEQ tablet Take 1 tablet by mouth once daily.  Refills: 2      PROAIR HFA 90 mcg/actuation inhaler Inhale 1 puff into the lungs  every 6 (six) hours as needed.  Refills: 11      acetaminophen (TYLENOL) 325 MG tablet Take 650 mg by mouth every 6 (six) hours as needed.      albuterol sulfate 2.5 mg/0.5 mL Nebu Inhale 2.5 mg into the lungs 3 (three) times daily as needed.      methotrexate 2.5 MG Tab Take 10 mg by mouth once a week.           No discharge procedures on file.

## 2017-02-17 NOTE — H&P (VIEW-ONLY)
Interventional Cardiology Consult Note                                                                                                                                       Referring Physician: Dr. Wolfe  Reason for Consult:  RHC/LHC as part of lung transplant eval    History of Present Illness: Patient is a pleasant 56 year old male with past medical history significant for sarcoidosis, KRISTYN, and pulmonary hypertension who is referred by Dr. Wolfe for RHC/LHC as part of transplant evaluation.  Patient currently on 3 L NC, feels SOB has been worsening over past several months.  Denies any chest pain, denies syncope or lightheadedness.  Does complain of MARTINEZ that has been worsening.  Complains of lower extremity edema.    11/2016 TTE   EF = 40-45%.  RSVP= 96 mm/hg  Left ventricular systolic function is mildly reduced.  There is mild mitral regurgitation.  Right ventricular systolic pressure is elevated at >60mmHg.    Past Medical History   Diagnosis Date    KRISTYN on CPAP     Osteopenia     Pulmonary hypertension     Sarcoidosis     Shortness of breath        Past Surgical History   Procedure Laterality Date    Abdominal surgery       6 weeks old    Cholecystectomy      Lung biopsy      Chest tube insertion      Bronchoscopy         Allergies: Review of patient's allergies indicates no known allergies.    Current Outpatient Prescriptions   Medication Sig    acetaminophen (TYLENOL) 325 MG tablet Take 650 mg by mouth every 6 (six) hours as needed.    albuterol sulfate 2.5 mg/0.5 mL Nebu Inhale 2.5 mg into the lungs 3 (three) times daily as needed.    fluticasone-salmeterol 500-50 mcg/dose (ADVAIR DISKUS) 500-50 mcg/dose DsDv diskus inhaler Inhale 1 puff into the lungs 2 (two) times daily.    furosemide (LASIX) 40 MG tablet Take 1 tablet by mouth once daily.    methotrexate 2.5 MG Tab Take 10 mg by mouth once a week.    multivitamin (THERAGRAN) per tablet Take 1 tablet by mouth once daily.    potassium  "chloride SA (K-DUR,KLOR-CON) 10 MEQ tablet Take 1 tablet by mouth once daily.    PROAIR HFA 90 mcg/actuation inhaler Inhale 1 puff into the lungs every 6 (six) hours as needed.     No current facility-administered medications for this visit.          There is no immunization history on file for this patient.  Family History:    Family History   Problem Relation Age of Onset    Hypertension Mother     Diabetes Father     Kidney disease Sister     Diabetes Brother      History   Alcohol Use No      History   Drug Use No      Social History     Social History    Marital status:      Spouse name: N/A    Number of children: N/A    Years of education: N/A     Occupational History    Not on file.     Social History Main Topics    Smoking status: Never Smoker    Smokeless tobacco: Not on file    Alcohol use No    Drug use: No    Sexual activity: Not on file     Other Topics Concern    Not on file     Social History Narrative     Review of Systems   Constitutional: Negative for chills and fever.   HENT: Negative for hearing loss.    Eyes: Negative for blurred vision, double vision, photophobia and pain.   Respiratory: Positive for cough. Negative for hemoptysis, sputum production, shortness of breath and wheezing.    Cardiovascular: Positive for orthopnea, leg swelling and PND. Negative for chest pain, palpitations and claudication.   Gastrointestinal: Negative for heartburn, nausea and vomiting.   Genitourinary: Negative for dysuria and urgency.   Skin: Negative for itching and rash.   Neurological: Negative for dizziness, tingling, focal weakness and headaches.   Endo/Heme/Allergies: Negative for environmental allergies. Does not bruise/bleed easily.   Psychiatric/Behavioral: Negative for depression.     Vitals  Visit Vitals    /87  Comment: LT    Pulse 99    Ht 5' 11" (1.803 m)    Wt 105.4 kg (232 lb 5.8 oz)    SpO2 (!) 93%    BMI 32.41 kg/m2     Physical Exam   Constitutional: He is " oriented to person, place, and time and well-developed, well-nourished, and in no distress. No distress.   HENT:   Head: Normocephalic and atraumatic.   Eyes: Conjunctivae are normal. Pupils are equal, round, and reactive to light.   Neck: Normal range of motion. Neck supple. No tracheal deviation present. No thyromegaly present.   Cardiovascular: Normal rate, regular rhythm, normal heart sounds and intact distal pulses.    Pulmonary/Chest: He has wheezes.   Abdominal: Bowel sounds are normal.   Musculoskeletal: He exhibits edema.   Neurological: He is alert and oriented to person, place, and time. No cranial nerve deficit. Gait normal. GCS score is 15.   Skin: Skin is warm and dry. He is not diaphoretic.         Assessment/Plan:  1. Lung transplant candidate - LHC/RHC as part of evaluation for transplant.  Right radial access and right brachial access.  Patient understands risks and benefits and gives consent.   2. secondary pulmonary hypertension - Group 5 PH secondary to sarcoidosis, scheduled to see Dr. Higuera   3. Sarcoidosis      Plan:  RHC: 18 gauge antecubital vein IV for RHC  [place]  Patient will need vasoreactivity testing     LHC:  Right radial access  Presley catheter  Pigtail catheter for LV gram  J wire

## 2017-02-17 NOTE — NURSING
Pt d/c'd to home via w/c accompanied by family.  Vss.  r radial/ brachial site remains cdi 0 bleed, 0 hematoma.  Instructed pt on home medications, post procedure precautions and follow up visits.  Pt verbalizes understanding.  Pt in no acute distress and verbalizes no complaints.

## 2017-02-17 NOTE — INTERVAL H&P NOTE
The patient has been examined and the H&P has been reviewed:    I concur with the findings and no changes have occurred since H&P was written.    Anesthesia/Surgery risks, benefits and alternative options discussed and understood by patient/family.          Active Hospital Problems    Diagnosis  POA    *Chronic respiratory failure with hypoxia [J96.11]  Yes    Secondary pulmonary hypertension [I27.2]  Yes    Sarcoidosis [D86.9]  Yes      Resolved Hospital Problems    Diagnosis Date Resolved POA   No resolved problems to display.     I have personally seen, taken the history and examined the patient.  I agree with the housestaff whose note reflects my findings and plan as above.

## 2017-02-17 NOTE — PLAN OF CARE
Problem: Patient Care Overview  Goal: Plan of Care Review  Outcome: Ongoing (interventions implemented as appropriate)  Pt arrived to unit accompanied by mother.  Vss. Oriented pt to rm and unit.  Pt on home o2, placed on 4l nc.  Pulse ox 92%.  Pre op orders and assessment initiated.  Pt in no acute distress and verbalizes no complaints.  Report called to cath lab.  Pt npo. Pt in no acute distress and verbalizes no complaints.  Will continue to monitor.

## 2017-02-17 NOTE — IP AVS SNAPSHOT
[facility]  [address]  [facility] [address]  Phone: [phone]           Patient Discharge Instructions     Our goal is to set you up for success. This packet includes information on your condition, medications, and your home care. It will help you to care for yourself so you don't get sicker and need to go back to the hospital.     Please ask your nurse if you have any questions.        There are many details to remember when preparing to leave the hospital. Here is what you will need to do:    1. Take your medicine. If you are prescribed medications, review your Medication List in the following pages. You may have new medications to  at the pharmacy and others that you'll need to stop taking. Review the instructions for how and when to take your medications. Talk with your doctor or nurses if you are unsure of what to do.     2. Go to your follow-up appointments. Specific follow-up information is listed in the following pages. Your may be contacted by a transition nurse or clinical provider about future appointments. Be sure we have all of the phone numbers to reach you, if needed. Please contact your provider's office if you are unable to make an appointment.     3. Watch for warning signs. Your doctor or nurse will give you detailed warning signs to watch for and when to call for assistance. These instructions may also include educational information about your condition. If you experience any of warning signs to your health, call your doctor.               [facility]  Unless otherwise directed by your provider, please contact [facility], our nurse care line that is available for 24/7 assistance.     [phone] (toll-free)    Registered nurses in the [facility] provide clinical advisement, health education, appointment booking, and other advisory services.                    ** Verify the list of medication(s) below is accurate and up  to date. Carry this with you in case of emergency. If your medications have changed, please notify your healthcare provider.             Medication List      ASK your doctor about these medications        Additional Info                      acetaminophen 325 MG tablet   Commonly known as:  TYLENOL   Refills:  0   Dose:  650 mg    Instructions:  Take 650 mg by mouth every 6 (six) hours as needed.     Begin Date    AM    Noon    PM    Bedtime       ADVAIR DISKUS 500-50 mcg/dose Dsdv diskus inhaler   Refills:  0   Dose:  1 puff   Generic drug:  fluticasone-salmeterol 500-50 mcg/dose    Instructions:  Inhale 1 puff into the lungs 2 (two) times daily.     Begin Date    AM    Noon    PM    Bedtime       * albuterol sulfate 2.5 mg/0.5 mL Nebu   Refills:  0   Dose:  2.5 mg    Instructions:  Inhale 2.5 mg into the lungs 3 (three) times daily as needed.     Begin Date    AM    Noon    PM    Bedtime       * PROAIR HFA 90 mcg/actuation inhaler   Refills:  11   Dose:  1 puff   Generic drug:  albuterol    Instructions:  Inhale 1 puff into the lungs every 6 (six) hours as needed.     Begin Date    AM    Noon    PM    Bedtime       furosemide 40 MG tablet   Commonly known as:  LASIX   Refills:  2   Dose:  1 tablet    Instructions:  Take 1 tablet by mouth once daily.     Begin Date    AM    Noon    PM    Bedtime       methotrexate 2.5 MG Tab   Refills:  0   Dose:  10 mg    Instructions:  Take 10 mg by mouth once a week.     Begin Date    AM    Noon    PM    Bedtime       multivitamin per tablet   Commonly known as:  THERAGRAN   Refills:  0   Dose:  1 tablet    Instructions:  Take 1 tablet by mouth once daily.     Begin Date    AM    Noon    PM    Bedtime       potassium chloride SA 10 MEQ tablet   Commonly known as:  K-DUR,KLOR-CON   Refills:  2   Dose:  1 tablet    Instructions:  Take 1 tablet by mouth once daily.     Begin Date    AM    Noon    PM    Bedtime       * Notice:  This list has 2 medication(s) that are the same as  "other medications prescribed for you. Read the directions carefully, and ask your doctor or other care provider to review them with you.               Please bring to all follow up appointments:    1. A copy of your discharge instructions.  2. All medicines you are currently taking in their original bottles.  3. Identification and insurance card.    Please arrive 15 minutes ahead of scheduled appointment time.    Please call 24 hours in advance if you must reschedule your appointment and/or time.        Your Scheduled Appointments     Apr 03, 2017 12:00 PM CDT   Non-Fasting Lab with LAB, APPOINTMENT [place] [facility] ([facility])    [address] [facility] [address]   [phone]            Apr 03, 2017 12:20 PM CDT   Proc 15Min/6Min Walk w/ O2 Qual with SIX, MINUTE WALK   [facility]monary [facility] ([facility] )    [address]  [place] [address]   [phone]            Apr 03, 2017  1:00 PM CDT   Consult with Shanell Higuera MD   [facility] ([facility] )    [address]  [place] [address]   [phone]                Discharge References/Attachments     CARDIAC CATHETERIZATION, DISCHARGE INSTRUCTIONS FOR (ENGLISH)        Primary Diagnosis     Your primary diagnosis was:  Sarcoidosis      Admission Information     Date & Time Provider Department CSN    2/17/2017  8:04 AM Sukhi Sharma MD [facility] [id number]      Care Providers     Provider Role Specialty Primary office phone    Sukhi Sharma MD Attending Provider Cardiology [phone]      Your Vitals Were     BP Pulse Temp Resp Height Weight    138/88 (BP Location: Left arm, Patient Position: Lying, BP Method: Automatic) 96 98.9 °F (37.2 °C) (Oral) 22 5' 11" (1.803 m) 104.3 kg (230 lb)    SpO2 BMI             97% 32.08 kg/m2         Recent Lab Values     No lab values to display.      Allergies as of 2/17/2017     No Known Allergies    "   Advance Directives     An advance directive is a document which, in the event you are no longer able to make decisions for yourself, tells your healthcare team what kind of treatment you do or do not want to receive, or who you would like to make those decisions for you.  If you do not currently have an advance directive, [facility] encourages you to create one.  For more information call:  [phone]), [phone] ([phone]),  or log on to [email or web address].        Language Assistance Services     ATTENTION: Language assistance services are available, free of charge. Please call [phone].      ATENCIÓN: Si nayeli prakash, tiene a smyth disposición servicios gratuitos de asistencia lingüística. Llame al [phone].     CHÚ Ý: N?u b?n nói Ti?ng Vi?t, có các d?ch v? h? tr? ngôn ng? mi?n phí dành cho b?n. G?i s? [phone].         [facility] complies with applicable Federal civil rights laws and does not discriminate on the basis of race, color, national origin, age, disability, or sex.

## 2017-02-17 NOTE — PLAN OF CARE
Problem: Patient Care Overview  Goal: Plan of Care Review  Outcome: Ongoing (interventions implemented as appropriate)  Received report from EVELIO Han. Patient s/p [facility] RHC, AAOx3. VSS, no c/o pain or discomfort at this time, resp even and unlabored.Vasc band to R wrist is CDI. No active bleeding. No hematoma noted. Post procedure protocol reviewed with patient and patient's family. Understanding verbalized. Family members at bedside. Nurse call bell within reach. Will continue to monitor per post procedure protocol.

## 2017-03-02 ENCOUNTER — DOCUMENTATION ONLY (OUTPATIENT)
Dept: TRANSPLANT | Facility: CLINIC | Age: 57
End: 2017-03-02

## 2017-04-03 ENCOUNTER — HOSPITAL ENCOUNTER (OUTPATIENT)
Dept: PULMONOLOGY | Facility: CLINIC | Age: 57
Discharge: HOME | End: 2017-04-03
Payer: COMMERCIAL

## 2017-04-03 ENCOUNTER — INITIAL CONSULT (OUTPATIENT)
Dept: TRANSPLANT | Facility: CLINIC | Age: 57
End: 2017-04-03
Payer: COMMERCIAL

## 2017-04-03 ENCOUNTER — LAB VISIT (OUTPATIENT)
Dept: LAB | Facility: HOSPITAL | Age: 57
End: 2017-04-03
Attending: INTERNAL MEDICINE
Payer: COMMERCIAL

## 2017-04-03 VITALS
OXYGEN SATURATION: 84 % | DIASTOLIC BLOOD PRESSURE: 77 MMHG | HEIGHT: 71 IN | WEIGHT: 234.56 LBS | WEIGHT: 230 LBS | SYSTOLIC BLOOD PRESSURE: 131 MMHG | BODY MASS INDEX: 32.2 KG/M2 | BODY MASS INDEX: 32.84 KG/M2 | HEIGHT: 71 IN | HEART RATE: 114 BPM

## 2017-04-03 DIAGNOSIS — J96.11 CHRONIC RESPIRATORY FAILURE WITH HYPOXIA: ICD-10-CM

## 2017-04-03 DIAGNOSIS — I27.9 CHRONIC PULMONARY HEART DISEASE: ICD-10-CM

## 2017-04-03 DIAGNOSIS — R06.82 TACHYPNEA: ICD-10-CM

## 2017-04-03 DIAGNOSIS — D86.9 SARCOIDOSIS: ICD-10-CM

## 2017-04-03 DIAGNOSIS — Z76.82 LUNG TRANSPLANT CANDIDATE: ICD-10-CM

## 2017-04-03 DIAGNOSIS — I27.0 PRIMARY PULMONARY HYPERTENSION: Primary | ICD-10-CM

## 2017-04-03 DIAGNOSIS — Z79.899 POLYPHARMACY: ICD-10-CM

## 2017-04-03 LAB
BASOPHILS # BLD AUTO: 0.07 K/UL
BASOPHILS NFR BLD: 0.9 %
BNP SERPL-MCNC: 180 PG/ML
DIFFERENTIAL METHOD: ABNORMAL
EOSINOPHIL # BLD AUTO: 0.3 K/UL
EOSINOPHIL NFR BLD: 3.2 %
ERYTHROCYTE [DISTWIDTH] IN BLOOD BY AUTOMATED COUNT: 13.3 %
HCT VFR BLD AUTO: 45.4 %
HGB BLD-MCNC: 14.4 G/DL
LYMPHOCYTES # BLD AUTO: 0.8 K/UL
LYMPHOCYTES NFR BLD: 9.7 %
MAGNESIUM SERPL-MCNC: 1.9 MG/DL
MCH RBC QN AUTO: 30.6 PG
MCHC RBC AUTO-ENTMCNC: 31.7 %
MCV RBC AUTO: 96 FL
MONOCYTES # BLD AUTO: 0.4 K/UL
MONOCYTES NFR BLD: 4.6 %
NEUTROPHILS # BLD AUTO: 6.5 K/UL
NEUTROPHILS NFR BLD: 81.4 %
PLATELET # BLD AUTO: 231 K/UL
PMV BLD AUTO: 9 FL
RBC # BLD AUTO: 4.71 M/UL
WBC # BLD AUTO: 8.02 K/UL

## 2017-04-03 PROCEDURE — 99205 OFFICE O/P NEW HI 60 MIN: CPT | Mod: S$GLB,,, | Performed by: INTERNAL MEDICINE

## 2017-04-03 PROCEDURE — 94620 PR PULMONARY STRESS TESTING,SIMPLE: CPT | Mod: S$GLB,,, | Performed by: INTERNAL MEDICINE

## 2017-04-03 PROCEDURE — 36415 COLL VENOUS BLD VENIPUNCTURE: CPT

## 2017-04-03 PROCEDURE — 83880 ASSAY OF NATRIURETIC PEPTIDE: CPT

## 2017-04-03 PROCEDURE — 85025 COMPLETE CBC W/AUTO DIFF WBC: CPT

## 2017-04-03 PROCEDURE — 83735 ASSAY OF MAGNESIUM: CPT

## 2017-04-03 PROCEDURE — 99999 PR PBB SHADOW E&M-EST. PATIENT-LVL IV: CPT | Mod: PBBFAC,,, | Performed by: INTERNAL MEDICINE

## 2017-04-03 PROCEDURE — 1160F RVW MEDS BY RX/DR IN RCRD: CPT | Mod: S$GLB,,, | Performed by: INTERNAL MEDICINE

## 2017-04-03 NOTE — PROCEDURES
Martin Hendrix Jr. is a 56 y.o.  male patient, who presents for a 6 minute walk test ordered by MD. Davida.  The diagnosis is Qualify for Oxygen.  The patient's BMI is 32.1kg/m2. Predicted distance (lower limit of normal) is 432.16 meters.    Test Results:    The test was not completed.  The patient stopped 2 times for a total of 190 seconds.  The total time walked was 170 seconds.  During walking, the patient reported:  Dyspnea. The patient used no assistive devices during testing.     04/03/2017---------Distance: 121.92 meters (400 feet)     O2 Sat % Supplemental Oxygen Heart Rate Blood Pressure Eugenio Scale   Pre-exercise  (Resting) 89 % Room Air 109 bpm 144/90 3   During Exercise 66 % Room Air 140 bpm 161/78 5-6   Post-exercise   65 % Room Air  141 bpm         Recovery Time: 62 seconds    Oxygen Qualification:     O2 Sat % Supplemental Oxygen Heart Rate Blood Pressure Eugenio Scale   Pre-exercise  (Resting) 96 %  (8 L NC cont. flow)  102 bpm  134/71  1    During Exercise 90 %   (8 L NC cont. flow)  113 bpm  137/80  1    Post-exercise   96 %   (8 L NC cont. flow)  108 bpm            Recovery Time: 85 seconds    Performing nurse/tech: TOO Monzon    PREVIOUS STUDY:   The patient had a previous study.  02/06/2017---------Distance: 329.18 meters (1080 feet)       O2 Sat % Supplemental Oxygen Heart Rate Blood Pressure Eugenio Scale   Pre-exercise  (Resting) 95 % 6 L/M 109 bpm 121/88 mmHg 0   During Exercise 80 % 6 L/M 146 bpm 133/88 mmHg 4   Post-exercise  (Recovery) 93 % 6 L/M  122 bpm   mmHg           CLINICAL INTERPRETATION:  Six minute walk distance is 121.92 meters (400 feet) with heavy dyspnea.  During exercise, there was significant desaturation while breathing room air.  Blood pressure remained stable and Heart rate increased significantly with walking.  Tachycardia was present prior to exercise.  This may represent a tachycardic response to exercise.  The patient did not report non-pulmonary symptoms  during exercise.  Severe exercise impairment is likely due to desaturation and cardiovascular causes.  The patient did not complete the study, walking 170 seconds of the 360 second test.  The patient may benefit from using supplemental oxygen.  Since the previous study in February 2017, exercise capacity is significantly worse.  Based upon age and body mass index, exercise capacity is less than predicted.   Oxygen saturation did improve while breathing supplemental oxygen.

## 2017-04-03 NOTE — PROGRESS NOTES
Subjective:    Patient ID:  Martin Hendrix Jr. is a 56 y.o. male who presents for evaluation of Pulmonary Hypertension.    HPI     57 yo Wm with Sarcoid Diagnosed in  by biopsy, Currently on On MTX 10 mg/week for long time, Advair bid, and Albuterol prn (prior to this tried pred, and ?thalidomide).  Referred by Rosemary Corona and Yaneth for management of PAH. Reports his breathing really started bothering him around  of last year. Was told at that time to quit work and start wearing o2,  He tried to cont working Dec- and he couldn't do it, so he had to give it  Up. Normally likes to keep active. Since he's been home, thinks the amount he can do before getting winded is even less. Says his O2 runs between 88-90% a lot of the time (has a pulse ox at home) he does not get SOB getting dressed or showering (does this without his o2) but then puts it on to walk around the house, make his bed, but he has to pace himself.  He does swell, but says it's better since he's been on O2, takes 40mg lasix daily- held it this am for the visit here so he wouldn't have to run to the BR. No CP, LH, presyncope or syncope-   wears his CPAP.    SH: nonsmoker, little Etoh, no Illicit    FH: dad had COPD and  with CHF  Mom has HTN, 71yo, implant loop recorder        Six Minute Walk Test:   122  m (   m in    )                                              O2 sat  89 ->66  %                  With 8L O2     sats improved to 96% rest and 90% walking                                      -> 140                                                                 BP  144 / 90  ->161 /78                                                         Eugenio   3  -> 5-6    Echo        TTE: OSH   Interpretation Summary  Ejection Fraction = 40-45%.  RSVP= 96 mm/hg  Left ventricular systolic function is mildly reduced.  A variety of Doppler measurements indicate impaired left ventricular  relaxation, which is associated with grade  I/IV or mild diastolic dysfunction.       KRISTYN AHI 9 with taina sat 74%  On BPAP at home with oxygen 5 LPMs    PFTs: FEV1 30%, TLC 47%, and DLCO 44%     CT chest result from OSH   1. Resolution of right upper lobe centrilobular nodules.  2. Scattered bilateral groundglass opacities which may reflect air trapping or pneumonitis. No other detrimental change.  3. Calcified mediastinal and hilar lymph nodes presumably related to patient's history of sarcoidosis.  4. Stable areas of parenchymal and interstitial scarring.  5. Enlargement of the main pulmonary artery which can be seen with pulmonary arterial hypertension.     R/LHC 2/17/17  After NO inhalation 20 ppm:  RA:  (24)  RV: 95/20  PW:  (20)  PA: 93/42 (61)      After NO inhalation 40 ppm:  PA: 84/35 (59)  PW:  (33)  AO_SAT: 94  LVP: 130  LVPEDP: 20  AOP: 132/93    After NO inhalation 60 ppm  PA: 84/36 (58)  PA_SAT: 73               The LM is normal. There is ALENA 3 flow.     -       The LAD is normal. There is ALENA 3 flow.     -       The LCX is normal. There is ALENA 3 flow.     -       The RCA is normal. There is ALENA 3 flow.    ECG 2/17/17  Normal sinus rhythm  Right axis deviation  Incomplete right bundle branch block  Probable Right ventricular hypertrophy with repolarization abnormality  Nonspecific T wave abnormality    Review of Systems   Constitution: Negative for chills, fever, malaise/fatigue and weight gain.   HENT: Negative.    Eyes: Negative.    Cardiovascular: Positive for dyspnea on exertion and leg swelling. Negative for chest pain, near-syncope, orthopnea, palpitations, paroxysmal nocturnal dyspnea and syncope.   Respiratory: Negative for cough and shortness of breath.    Endocrine: Negative.    Skin: Negative.    Musculoskeletal: Negative.    Gastrointestinal: Negative for bloating, abdominal pain and change in bowel habit.   Neurological: Negative for dizziness and light-headedness.   Psychiatric/Behavioral: Negative for depression.       "  Objective:  /77  Pulse (!) 114  Ht 5' 11" (1.803 m)  Wt 106.4 kg (234 lb 9.1 oz)  SpO2 (!) 84% Comment: Pt on 3L of O2 at time of reading  BMI 32.72 kg/m2      Physical Exam   Constitutional: He is oriented to person, place, and time. He appears well-developed and well-nourished.   HENT:   Head: Normocephalic and atraumatic.   Eyes: Right eye exhibits no discharge. Left eye exhibits no discharge.   Neck: Neck supple. No JVD present. No thyromegaly present.   Cardiovascular: Normal rate.  Exam reveals gallop. Exam reveals no friction rub.    No murmur heard.  Tachy, + S3, loud S2     Pulmonary/Chest: Effort normal and breath sounds normal. No respiratory distress. He has no wheezes. He has no rales.   Abdominal: Soft. Bowel sounds are normal. He exhibits no distension. There is no tenderness.   Musculoskeletal: Normal range of motion. He exhibits edema. He exhibits no tenderness.   1+ pitting edema at ankles   Neurological: He is alert and oriented to person, place, and time. No cranial nerve deficit. Coordination normal.   Skin: Skin is warm and dry. No rash noted.   Psychiatric: He has a normal mood and affect. Judgment and thought content normal.           Chemistry        Component Value Date/Time     02/17/2017 0838    K 4.4 02/17/2017 0838     02/17/2017 0838    CO2 30 (H) 02/17/2017 0838    BUN 12 02/17/2017 0838    CREATININE 0.8 02/17/2017 0838     (H) 02/17/2017 0838        Component Value Date/Time    CALCIUM 9.0 02/17/2017 0838            Magnesium   Date Value Ref Range Status   04/03/2017 1.9 1.6 - 2.6 mg/dL Final       Lab Results   Component Value Date    WBC 8.02 04/03/2017    HGB 14.4 04/03/2017    HCT 45.4 04/03/2017    MCV 96 04/03/2017     04/03/2017       Lab Results   Component Value Date    INR 1.0 02/14/2017       BNP   Date Value Ref Range Status   04/03/2017 180 (H) 0 - 99 pg/mL Final     Comment:     Values of less than 100 pg/ml are consistent with " non-CHF populations.       No results found for: LDH          Assessment:       1. Secondary pulmonary hypertension- in setting of sarcoidosis- severe by RHC, with RVE and depressed RV fxn on echo- has MILD diastolic dysfunction by echo, suspect the elevated PCWP is due to RVE and FADIA impairing ability of left heart to fill, although sarcoid can certainly affect the heart and cause both systolic and diastolic dysfxn. Mild volume overload on exam, with mildly increased BNP   2. Sarcoidosis    3. Lung transplant candidate    4. Chronic respiratory failure with hypoxia    5. Chronic pulmonary heart disease      WHO Group:5  Functional Class3     Plan:      Given severity of PH on RHC and echo Will start adcirca 20mg daily x 2 wk, followed by 40mg daily.    Keep salt intake to under 2000 mg sodium, fluids to under 2 L (64 oz)    Daily wts, may take an extra lasix for wt gain 3# overnight or 5# in 1 wk, should call us if fluid doesn't come off    Would like to repeat RHC next month personally to reassess filling pressures and PAP (was done by interventional), will have been on adcirca for ~1 mo, suspect he will need additional tx but may have trouble getting it approved with underlying dx of sarcoidosis. Will need to be thoughtful about addition of pulmonary VD tx as I reviewed his CT images and there is some fibrosis, will need to be on the lookout for increased hypoxia indicative of increased V/Q mismatch     Might be worthwhile to get cardiac MRI to look for cardiac sarcoid- will discuss following repeat RHC first week of March

## 2017-04-03 NOTE — PATIENT INSTRUCTIONS
Start adcirca, if you  Have any side effects call Dk [phone]    Vikki [phone]    Keep salt intake to under 2000 mg sodium, fluids to under 2 L (64 oz)    Check your weights every morning after getting out of bed and urinating. If your weight goes up 3# overnight or 5# in one week take an extra lasix. Call us if the fluid doesnt come off    We will do a right heart catheterization to measure the blood pressure in your lungs-  Take your usual medicines and eat a light breakfast that am.  Labs on 2nd floor- then go to third [facility] cath lab waiting area and check in  Bring your lasix and bring it when you first get here, I want to see your fluid levels at their best!

## 2017-04-03 NOTE — MR AVS SNAPSHOT
[facility]  [address]  [facility] [address]  Phone: [phone]                  Martin Hendrix Jr.   4/3/2017 1:00 PM   Initial consult    Description:  Male : 1960   Provider:  Shanell Higuera MD   Department:  [facility]           Reason for Visit     Pulmonary Hypertension           Diagnoses this Visit        Comments    Secondary pulmonary hypertension    -  Primary     Sarcoidosis         Lung transplant candidate         Chronic respiratory failure with hypoxia         Chronic pulmonary heart disease                To Do List           Future Appointments        Provider Department Dept Phone    4/3/2017 12:20 PM SIX, MINUTE WALK [facility] [phone]    4/3/2017 1:00 PM Shanell Higuera MD [facility] [phone]      Goals (5 Years of Data)     None      [facility]     [facility] Nurse Care Line -  Assistance  Unless otherwise directed by your provider, please contact [facility], our nurse care line that is available for  assistance.     Registered nurses in the [facility] provide: appointment scheduling, clinical advisement, health education, and other advisory services.  Call: [phone] (toll free)               Medications                Verify that the below list of medications is an accurate representation of the medications you are currently taking.  If none reported, the list may be blank. If incorrect, please contact your healthcare provider. Carry this list with you in case of emergency.           Current Medications     acetaminophen (TYLENOL) 325 MG tablet Take 650 mg by mouth every 6 (six) hours as needed.    albuterol sulfate 2.5 mg/0.5 mL Nebu Inhale 2.5 mg into the lungs 3 (three) times daily as needed.    fluticasone-salmeterol 500-50 mcg/dose (ADVAIR DISKUS) 500-50 mcg/dose DsDv diskus inhaler Inhale 1 puff into the lungs 2 (two) times daily.    furosemide (LASIX) 40  "MG tablet Take 1 tablet by mouth once daily.    methotrexate 2.5 MG Tab Take 10 mg by mouth once a week.    multivitamin (THERAGRAN) per tablet Take 1 tablet by mouth once daily.    potassium chloride SA (K-DUR,KLOR-CON) 10 MEQ tablet Take 1 tablet by mouth once daily.    PROAIR HFA 90 mcg/actuation inhaler Inhale 1 puff into the lungs every 6 (six) hours as needed.           Clinical Reference Information           Your Vitals Were     BP Pulse Height Weight SpO2 BMI    131/77 114 5' 11" (1.803 m) 106.4 kg (234 lb 9.1 oz) 84% 32.72 kg/m2      Blood Pressure          Most Recent Value    BP  131/77      Allergies as of 4/3/2017     No Known Allergies      Immunizations Administered on Date of Encounter - 4/3/2017     None      Orders Placed During Today's Visit      Normal Orders This Visit    Case Request Operating Room: HEART CATH-RIGHT     Future Labs/Procedures Expected by Expires    CBC auto differential  4/3/2017 (Approximate) 6/2/2018    Comprehensive metabolic panel  4/3/2017 (Approximate) 6/2/2018    Cath lab procedure  As directed 4/3/2018      Maintenance Dialysis History     Patient has no recorded history of maintenance dialysis.      Transplant Information        Txp Date Organ Coordinator Care Team     Lung Sybil Estrada RN Referring Physician:  Florentin Corona MD         Instructions    Start adcirca, if you  Have any side effects call Mariana- [phone]    Vikki [phone]    Keep salt intake to under 2000 mg sodium, fluids to under 2 L (64 oz)    Check your weights every morning after getting out of bed and urinating. If your weight goes up 3# overnight or 5# in one week take an extra lasix. Call us if the fluid doesnt come off    We will do a right heart catheterization to measure the blood pressure in your lungs-  Take your usual medicines and eat a light breakfast that am.  Labs on 2nd floor- then go to third [facility] cath lab waiting area and check in  Bring your lasix and bring it when you " first get here, I want to see your fluid levels at their best!         Language Assistance Services     ATTENTION: Language assistance services are available, free of charge. Please call [phone].      ATENCIÓN: Si habdaja prakash, tiene a smyth disposición servicios gratuitos de asistencia lingüística. Llame al [phone].     CHÚ Ý: N?u b?n nói Ti?ng Vi?t, có các d?ch v? h? tr? ngôn ng? mi?n phí dành cho b?n. G?i s? [phone].         [facility] complies with applicable Federal civil rights laws and does not discriminate on the basis of race, color, national origin, age, disability, or sex.

## 2017-04-03 NOTE — LETTER
April 3, 2017        Florentin Corona  [address]  [address]  [facility] [address]  Phone: [phone]  Fax: [phone]             [facility] [address] [place] [address]  Phone: [phone]   Patient: Martin Hendrix Jr.   MR Number: [id number]   YOB: 1960   Date of Visit: 4/3/2017       Dear Dr. Florentin Corona    Thank you for referring Martin Hendrix to me for evaluation. Attached you will find relevant portions of my assessment and plan of care.    If you have questions, please do not hesitate to call me. I look forward to following Martin Hendrix along with you.    Sincerely,    Shanell Higuera MD    Enclosure    If you would like to receive this communication electronically, please contact [email or web address] or [phone] to request "[employer] Link access.    "[employer] Link is a tool which provides read-only access to select patient information with whom you have a relationship. Its easy to use and provides real time access to review your patients record including encounter summaries, notes, results, and demographic information.    If you feel you have received this communication in error or would no longer like to receive these types of communications, please e-mail [email or web address]

## 2017-04-05 ENCOUNTER — PATIENT MESSAGE (OUTPATIENT)
Dept: TRANSPLANT | Facility: CLINIC | Age: 57
End: 2017-04-05

## 2017-04-05 RX ORDER — TADALAFIL 20 MG/1
40 TABLET ORAL DAILY
Qty: 28 TABLET | Refills: 11 | Status: SHIPPED | OUTPATIENT
Start: 2017-04-05 | End: 2017-04-13 | Stop reason: SDUPTHER

## 2017-04-06 ENCOUNTER — PATIENT MESSAGE (OUTPATIENT)
Dept: TRANSPLANT | Facility: CLINIC | Age: 57
End: 2017-04-06

## 2017-04-06 DIAGNOSIS — I27.9 CHRONIC PULMONARY HEART DISEASE: Primary | ICD-10-CM

## 2017-04-07 ENCOUNTER — TELEPHONE (OUTPATIENT)
Dept: TRANSPLANT | Facility: CLINIC | Age: 57
End: 2017-04-07

## 2017-04-12 DIAGNOSIS — Z76.82 LUNG TRANSPLANT CANDIDATE: ICD-10-CM

## 2017-04-12 DIAGNOSIS — D86.9 SARCOIDOSIS: Primary | ICD-10-CM

## 2017-04-13 ENCOUNTER — TELEPHONE (OUTPATIENT)
Dept: TRANSPLANT | Facility: CLINIC | Age: 57
End: 2017-04-13

## 2017-04-13 DIAGNOSIS — Z79.01 ANTICOAGULATION MONITORING, SPECIAL RANGE: Primary | ICD-10-CM

## 2017-04-13 DIAGNOSIS — I27.0 PRIMARY PULMONARY HYPERTENSION: ICD-10-CM

## 2017-04-13 RX ORDER — TADALAFIL 20 MG/1
40 TABLET ORAL DAILY
Qty: 28 TABLET | Refills: 11 | COMMUNITY
Start: 2017-04-13 | End: 2017-05-02 | Stop reason: SDUPTHER

## 2017-04-13 NOTE — TELEPHONE ENCOUNTER
Spoke with patient about Adcirca. He reports no problems with one pill daily, at this time. Confirmed that he will begin taking 2 pills daily on April 17th and continue with that regimen. Patient is schedule for [facility] on May 22nd. Mr. Hendrix also needs an updated MD note for this disability. He is having the Disability office send information on what they need to this office.

## 2017-04-18 ENCOUNTER — TELEPHONE (OUTPATIENT)
Dept: TRANSPLANT | Facility: CLINIC | Age: 57
End: 2017-04-18

## 2017-04-18 NOTE — TELEPHONE ENCOUNTER
Explained to patient's daughter that we do not complete [place] paperwork unless the patient is listed/transplanted.  She asked isn't that what the patient is being seen for next week?  Explained that we are working the patient up to see if he is a candidate for transplant.  Explained that once all of the results from the tests are received, we will discuss his case in Selection and determine whether or not he will be offered listing.  She verbalized understanding and asked if she should contact his primary doctor.  I explained that she should.

## 2017-04-18 NOTE — TELEPHONE ENCOUNTER
----- Message from Zenia Jarquin sent at 4/18/2017  1:28 PM CDT -----  Contact: pt  Please call pt daughter wants to speak with Coord, regarding FMLA paperwork for her, please call @ # [phone].

## 2017-04-24 ENCOUNTER — CLINICAL SUPPORT (OUTPATIENT)
Dept: TRANSPLANT | Facility: CLINIC | Age: 57
End: 2017-04-24
Payer: COMMERCIAL

## 2017-04-24 ENCOUNTER — SOCIAL WORK (OUTPATIENT)
Dept: TRANSPLANT | Facility: CLINIC | Age: 57
End: 2017-04-24
Payer: COMMERCIAL

## 2017-04-24 ENCOUNTER — HOSPITAL ENCOUNTER (OUTPATIENT)
Dept: RADIOLOGY | Facility: HOSPITAL | Age: 57
Discharge: HOME | End: 2017-04-24
Attending: INTERNAL MEDICINE
Payer: COMMERCIAL

## 2017-04-24 DIAGNOSIS — Z76.82 LUNG TRANSPLANT CANDIDATE: ICD-10-CM

## 2017-04-24 DIAGNOSIS — D86.9 SARCOIDOSIS: ICD-10-CM

## 2017-04-24 PROCEDURE — 71020 XR CHEST PA AND LATERAL: CPT | Mod: TC,TXP

## 2017-04-24 PROCEDURE — 71020 XR CHEST PA AND LATERAL: CPT | Mod: 26,TXP,, | Performed by: RADIOLOGY

## 2017-04-24 PROCEDURE — 78597 LUNG PERFUSION DIFFERENTIAL: CPT | Mod: 26,TXP,, | Performed by: NUCLEAR MEDICINE

## 2017-04-24 PROCEDURE — A9540 TC99M MAA: HCPCS | Mod: TXP

## 2017-04-24 NOTE — PROGRESS NOTES
Transplant Recipient Adult Psychosocial Assessment    Martin Hendrix  [address]  [facility] [address]    Telephone Information:   Mobile [phone]   Home  [phone] (home)  Work  There is no work phone number on file.  E-mail  [email or web address]    Sex: male  YOB: 1960  Age: 56 y.o.    Encounter Date: 2017  [place] Citizen: yes  Primary Language: English   Needed: no    Emergency Contact:  Name: Susy Hendrix   Relationship: mother  Address: [facility]  Phone Numbers:  [phone] (mobile)    Family/Social Support:   Number of dependents/: 0  Marital history:   Other family dynamics: Pt presented with elderly mother (Charley) whom presented as disengaged as evidence by  mumbling to supervisor in the room, father is , one brother Williams Hendrix ([phone])  one sister Albina Teixeira ([phone]) , one daughter Sybil Hendrix ([phone]). Pt reports that family is supportive.    Household Composition:  Name: Martin Hendrix  Age: 56  Relationship: patient  Does person drive? yes     Patient currently resides alone.       Do you and your caregivers have access to reliable transportation? yes  PRIMARY CAREGIVER: No primary caregiver established.      SW provided information regarding the duties and roles of a caregiver. SW shared concerns about caregivers advanced age and visual limitations in ability to potentially fully assist pt.        provided in-depth information to patient and caregiver regarding pre- and post-transplant caregiver role.   strongly encourages patient and caregiver to have concrete plan regarding post-transplant care giving, including back-up caregiver(s) to ensure care giving needs are met as needed.    Patient and Caregiver states understanding all aspects of caregiver role/commitment.       Patient and Caregiver verbalizes understanding of the education provided today and caregiver  responsibilities.         remains available. Patient and Caregiver agree to contact  in a timely manner if concerns arise.      Able to take time off work without financial concerns: yes.     Additional Significant Others who will Assist with Transplant:  Name: Sybil Hendrix   Age: 26  City: [facility] State: [place]  Relationship: daughter  Does person drive? yes   ([phone])    Will need to call to establish Sybil as Primary Caregiver    Name: Williams Hendrix  Age: 53  City: [place] State: [place]  Relationship: brother  Does person drive? yes   ([phone])    Name: Albina Teixeira   Age: 46  City: [place] State: [place]  Relationship: Sister   Does person drive? Yes  ([phone])    Living Will: no  Healthcare Power of : no  Advance Directives on file: Pt does not currently have a living will or Healthcare Power of   Verbally reviewed LW/HCPA information.   provided patient with copy of LW/HCPA documents and provided education on completion of forms.        Highest Education Level: High School (9-12) or GED  Reading Ability: 12th grade  Reports difficulty with: seeing Pt has glasses to assist with vision problems  Learns Best By: Reading and visual display.      Status: yes: Shireen, date: 5979-7269  VA Benefits: no     Working for Income: No  If no, reason not working: Disability  Patient is disabled. Prior to disability pt worked in construction.     Spouse/Significant Other Employment: N/A    Disabled: yes: date disability began: Feb 2017, due to: Sarcoidosis. Pt is in the process of switching from short term disability to long term disability with current employers. Pt has applied for SS disability.     Monthly Income:  Employment Disability: $1180/week Amount will adjust once pt transitions to long term disability.  Able to afford all costs now and if transplanted, including medications: no Currently no income pt will need to fundraise. Does have 40K in  savings however, living off of that now.      Patient and Caregiver verbalizes understanding of personal responsibilities related to transplant costs and the importance of having a financial plan to ensure that patients transplant costs are fully covered.      provided fundraising information/education.  Patient and Caregiver verbalizes understanding.   remains available.    Insurance:   Payor/Plan Subscr  Sex Relation Sub. Ins. ID Effective Group Num   1. AETNA - AETNA* JAYE BORDEN* 1960 Male  [id number] 17 [id number]                                   [address]     Primary Insurance (for UNOS reporting): Private Insurance  Secondary Insurance (for UNOS reporting): None  Patient verbalizes clear understanding that patient may experience difficulty obtaining and/or be denied insurance coverage post-surgery. This includes and is not limited to disability insurance, life insurance, health insurance, burial insurance, long term care insurance, and other insurances.    Patient also reports understanding that future health concerns related to or unrelated to transplantation may not be covered by patient's insurance.  Resources and information provided and reviewed.      Patient provides verbal permission to release any necessary information to outside resources for patient care and discharge planning.  Resources and information provided are reviewed.        Infusion Service: patient utilizing? no  Home Health: patient utilizing? no  DME: yes O2 concentrator (goes up to 5L, currently on 3L) pt uses [employer] for O2 needs.  CPAP   Pulmonary/Cardiac Rehab: no  ADLS:  Pt reports independent with all ADLS and pt does drive.     Adherence:   Pt states he is adherent with all medication and medical recommendations.  Adherence education and counseling provided.     Per History Section:  Past Medical History:   Diagnosis Date    On home oxygen therapy     KRISTYN on CPAP      Osteopenia     Pulmonary hypertension     Sarcoidosis     Shortness of breath      Social History   Substance Use Topics    Smoking status: Never Smoker    Smokeless tobacco: Never Used    Alcohol use No     History   Drug Use No     History   Sexual Activity    Sexual activity: Not on file       Per Today's Psychosocial:  Tobacco: none, patient denies any use.  Alcohol: none, patient denies any use.  Illicit Drugs/Non-prescribed Medications: none, patient denies any use.    Patient and Caregiver states clear understanding of the potential impact of substance use as it relates to transplant candidacy and is aware of possible random substance screening.  Substance abstinence/cessation counseling, education and resources provided and reviewed.     Arrests/DWI/Treatment/Rehab: yes  Pt reports he was arrested once in his 20's for fighting and received one DWI in his 20's. Pt reports no history of treatment or rehab.     Psychiatric History:    Mental Health: No history of depression/anxiety or other mental health concerns reported.   Psychiatrist/Counselor: No history reported  Medications:  No medications reported  Suicide/Homicide Issues: No hx of si/hi now or in past.  Safety at home: Pt reports no concerns with safety at home.    Knowledge: Patient and Caregiver states having clear understanding and realistic expectations regarding the potential risks and potential benefits of organ transplantation and organ donation, agrees to discuss with health care team members and support system members and to utilize available resources and express questions and/or concerns in order to further facilitate the pt informed decision-making.  Resources and information provided and reviewed.     Patient is aware of [facility] affiliation and/or partnership with agencies in home health care, LTAC, SNF, [facility], and other hospitals and clinics.    Understanding: Patient reports having a clear understanding of the many lifetime  commitments involved with being a transplant recipient, including costs, compliance, medications, lab work, procedures, appointments, concrete and financial planning, preparedness, timely and appropriate communication of concerns, abstinence (ETOH, tobacco, illicit non-prescribed drugs), adherence to all health care team recommendations, support system and caregiver involvement, appropriate and timely resource utilization and follow-through, mental health counseling as needed/recommended, and patient and caregiver responsibilities.  Social Service Handbook, resources and detailed educational information provided and reviewed.  Educational information provided.    Patient also reports current and expected compliance with health care regime and states having a clear understanding of the importance of compliance.      Patient reports a clear understanding that risks and benefits may be involved with organ transplantation and with organ donation.      Patient also reports clear understanding that psychosocial risk factors may affect patient, and include but are not limited to feelings of depression, generalized anxiety, anxiety regarding dependence on others, post traumatic stress disorder, feelings of guilt and other emotional and/or mental concerns, and/or exacerbation of existing mental health concerns.  Detailed resources provided and discussed.     Patient agrees to access appropriate resources in a timely manner as needed and/or as recommended, and to communicate concerns appropriately.  Patient also reports a clear understanding of treatment options available.      reviewed education, provided additional information, and answered questions.    Feelings or Concerns: Pt reports taking all the information in, feeling overwhelmed. Pt has no further concerns. Sw expressed concerns regarding caregiver plan and financial plan.  SW provided supportive counseling, validation, and normalization. Pt verbalizes  understanding.  SW remains available.        Coping: Pt reports coping by taking everything one step at a time.     Goals: Pt goal is to be able to return back to work. 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 SW provided education on what services Vocational Rehabilitation offers.  Patient referred to Vocational Rehabilitation.     Interview Behavior: Patient presents as alert and oriented x 4, pleasant, good eye contact, recall good, concentration/judgement good, calm, communicative, cooperative and asking and answering questions appropriately. Caregiver presented as easily distracted mumbling to supervisor present in room.       Pt was seen with JULEE Singh LMSW.       Transplant Social Work - Candidacy  Assessment/Plan:     Psychosocial Suitability: Patient presents as an unacceptable candidate for lung transplant at this time. Based on psychosocial risk factors, patient presents as high risk, due to inappropriate caregiver plan and stable financial plan. Current caregiver elderly mother with visual limitations impacting ability to potentially fully assist pt.  SW discussed with pt and caregiver the importance of having at least three reliable caregivers along with mother that will be able to assist with transplant process or a different primary caregiver.     Recommendations/Additional Comments:   SW discussed the [employer] with pt due to pt living outside the hour limit. DEV recommends pt follows up with long term disability and engages in fundraising.       DAYRON Dimas Intern

## 2017-04-24 NOTE — PROGRESS NOTES
PRE EDUCATION NOTE    Met with Martin Hendrix, his mother and daughter for pre-transplant education and to consent for lung transplant evaluation.  Pre-transplant educational binder given to patient during his last clinic visit.  Patient read the binder.      Thorough pre-transplant education conducted.    Information presented included:  · Evaluation process and testing  · Members of the transplant team  · Selection committee members and role of the committee  · Contraindications to lung transplantation  · Policy for absolute smoking / nicotine cessation for at least 6 months prior to listing  · Relocation pre- and post-transplant  · Listing process for transplant: explained the listing designations, active versus inactive (status 7) and lung allocation score (LAS)  · National graft survival statistics, our current survival statistics since reopening of the program to present  · Wait time on the list  · The need to reach patient within 15 minutes with donor offer  ·  [facility] donors with increased risk of disease transmission  · Donor criteria and testing on donor, procurement of donor lungs and ischemic time, blood transfusions, process for matching donor with recipient  · Transplant surgery, single vs. double, estimated length of surgery, surgical incision, chest tubes and purpose, and ventilator  · Post-transplant immunosuppression for life with the need to be able to afford post transplant medications, immediate post transplant ICU stay - extubation, explained the importance of getting out of bed (once extubated) and the importance of coughing and taking deep breaths despite the pain to prevent pneumonia  · Need for a caregiver to be with him at all times beginning with discharge from ICU and transfer to TSU, through at least the first 3 months post-transplant possibly longer  · Post-transplant medications, their side effects, and self medications  · Discharge, follow-up clinic visits, testing with  each visit, and surveillance bronchoscopies  · Rejection and treatment, how to reach team members at any time  · Health maintenance post-transplant, avoiding large crowds and sick contacts, wearing a mask, good handwashing, pet policy, fishing, dermatology, opthamology, and dental visits post-transplant  · Multiple listing information provided    Martin Hendrix, his mother and daughter asked pertinent questions which were answered to their satisfaction.     Informed Consent to Undergo Evaluation for Lung Transplantation reviewed and discussed with patient, his mother and daughter.  Consent initialed and signed by patient.  Copy of consent given to patient.  Original to be sent to Medical Records for scanning.

## 2017-04-25 ENCOUNTER — HOSPITAL ENCOUNTER (OUTPATIENT)
Dept: RADIOLOGY | Facility: HOSPITAL | Age: 57
Discharge: HOME | End: 2017-04-25
Attending: INTERNAL MEDICINE
Payer: COMMERCIAL

## 2017-04-25 ENCOUNTER — NUTRITION (OUTPATIENT)
Dept: TRANSPLANT | Facility: CLINIC | Age: 57
End: 2017-04-25
Payer: COMMERCIAL

## 2017-04-25 VITALS — BODY MASS INDEX: 33.24 KG/M2 | HEIGHT: 69 IN | WEIGHT: 224.44 LBS

## 2017-04-25 DIAGNOSIS — Z76.82 LUNG TRANSPLANT CANDIDATE: ICD-10-CM

## 2017-04-25 DIAGNOSIS — D86.9 SARCOIDOSIS: ICD-10-CM

## 2017-04-25 DIAGNOSIS — I27.9 CHRONIC PULMONARY HEART DISEASE: Primary | ICD-10-CM

## 2017-04-25 PROCEDURE — 76700 US EXAM ABDOM COMPLETE: CPT | Mod: TC,TXP

## 2017-04-25 PROCEDURE — 74220 X-RAY XM ESOPHAGUS 1CNTRST: CPT | Mod: TC,TXP

## 2017-04-25 PROCEDURE — 99999 PR PBB SHADOW E&M-EST. PATIENT-LVL II: CPT | Mod: PBBFAC,TXP,, | Performed by: DIETITIAN, REGISTERED

## 2017-04-25 PROCEDURE — 93880 EXTRACRANIAL BILAT STUDY: CPT | Mod: 26,TXP,, | Performed by: RADIOLOGY

## 2017-04-25 PROCEDURE — 97802 MEDICAL NUTRITION INDIV IN: CPT | Mod: S$GLB,TXP,, | Performed by: DIETITIAN, REGISTERED

## 2017-04-25 PROCEDURE — 93880 EXTRACRANIAL BILAT STUDY: CPT | Mod: TC,TXP

## 2017-04-25 PROCEDURE — 74220 X-RAY XM ESOPHAGUS 1CNTRST: CPT | Mod: 26,TXP,, | Performed by: RADIOLOGY

## 2017-04-25 PROCEDURE — 76700 US EXAM ABDOM COMPLETE: CPT | Mod: 26,TXP,, | Performed by: RADIOLOGY

## 2017-04-25 NOTE — PROGRESS NOTES
"TRANSPLANT NUTRITIONAL ASSESSMENT    Referring Provider: Darrick Wolfe MD    Reason for Visit: Pre-lung transplant work-up    Age: 56 y.o.  Sex: male    Patient Active Problem List   Diagnosis    Lung transplant candidate    Sarcoidosis    Secondary pulmonary hypertension    Chronic respiratory failure with hypoxia    Chronic pulmonary heart disease     Past Medical History:   Diagnosis Date    On home oxygen therapy     KRISTYN on CPAP     Osteopenia     Pulmonary hypertension     Sarcoidosis     Shortness of breath      Lab Results   Component Value Date     (H) 04/24/2017    K 3.7 04/24/2017    MG 1.9 04/24/2017    CHOL 208 (H) 04/24/2017    HDL 47 04/24/2017    TRIG 74 04/24/2017    ALBUMIN 3.9 04/24/2017    HGBA1C 6.7 (H) 04/24/2017    CALCIUM 9.2 04/24/2017     Other Pertinent Labs: none    Current Outpatient Prescriptions   Medication Sig    acetaminophen (TYLENOL) 325 MG tablet Take 650 mg by mouth every 6 (six) hours as needed.    albuterol sulfate 2.5 mg/0.5 mL Nebu Inhale 2.5 mg into the lungs 3 (three) times daily as needed.    fluticasone-salmeterol 500-50 mcg/dose (ADVAIR DISKUS) 500-50 mcg/dose DsDv diskus inhaler Inhale 1 puff into the lungs 2 (two) times daily.    furosemide (LASIX) 40 MG tablet Take 1 tablet by mouth once daily.    methotrexate 2.5 MG Tab Take 10 mg by mouth once a week.    multivitamin (THERAGRAN) per tablet Take 1 tablet by mouth once daily.    potassium chloride SA (K-DUR,KLOR-CON) 10 MEQ tablet Take 1 tablet by mouth once daily.    PROAIR HFA 90 mcg/actuation inhaler Inhale 1 puff into the lungs every 6 (six) hours as needed.    tadalafil, PULMONARY HYPERTENSION, (ADCIRCA) 20 mg Tab Take 2 tablets (40 mg total) by mouth once daily.     No current facility-administered medications for this visit.      Allergies: Review of patient's allergies indicates no known allergies.    Ht Readings from Last 1 Encounters:   04/25/17 5' 9.29" (1.76 m)     Wt " "Readings from Last 1 Encounters:   04/25/17 101.8 kg (224 lb 6.9 oz)      BMI: Body mass index is 32.86 kg/(m^2).    Usual Weight: 200-215 lb  Weight Change/Time: some weight gain over past 2 months, unintentional about 10 lbs  Current Diet: Regular  Appetite/Current Intake: good   Exercise/Physical Activity: functional in ADL's, shopping, house work, walking dog  Nutritional/Herbal Supplements: potassium  Chewing/Swallowing Problems: none  Symptoms: none    Estimated Kcal Need: 3396-3772 kcal (20-25 kcal/kg)  Estimated Protein Need: 102 gm ( 1 gm/kg)    Nutritional History:   Pt present alone today for visit, states mother and daughter are with him but stepped out of clinic. Pt is independent, lives alone though his mother prepares many meals for him. He will request dishes he likes and she will make them. Martin reports he has received edu on the "DASH" diet in the past and has tried to make some changes to his diet, less sodium and less concentrated sweets.   Pt stays active as he can and misses going to work, he wants to loose 15-20 lbs. Pt provided the following diet recall:  B: 2 eggs, toast, blueberries or strawberries, sometimes 2 pieces of cameron, water  L: usually hot meal home cooked; red beans & rice w/ meat OR spag w/ meat balls or ground meat OR homemade chicken soup OR deli meat sandwich ham/bologna/salami/pickles OR fast food 2 x/week  D: left overs from lunch OR Casino buffet or restaurant, likes seafood baked or fried, likes spinach salad /w tomatoes, Ranch dressing  Snack: microwave popcorn, chips, occasional cookie or ice cream (with grandchildren)  Beverages: water w/ lemon, unsweetened tea, occasional soda maybe 3 x/week  Nutritional Diagnoses  Problem: food- and nutrition-related knowledge deficit  Etiology: r/t no prior edu on following low sodium diet in detail (label reading tips, cooking etc)  Symptoms: aeb diet recall    Educational Need? yes  Barriers: none identified  Discussed with: " patient  Interventions: Patient taught nutrition information regarding Pre-lung transplant work-up.    Reviewed Low Sodium packet (2g Na diet, Na content of foods, food label strategies, flavoring tips, & low sodium recipes).   Encouraged sharing info w/ mother since she does majority of cooking and she uses salt and salty seasonings in cooking.   Limit fast food, increase fresh fruit & vegetable intake, make healthy selections at buffet and restaurant.  Stay active daily, short intervals as tolerated.  Goals/Recommendations: diet adherence and gradual weight loss  Actions Taken: instruct/provide written information  Patient and/or family comprehend instructions: yes  Outcome: Verbalizes understanding  Monitoring: Follow up in clinic if needed. RD contact info provided.    Counseling Time: 30 minutes

## 2017-04-26 ENCOUNTER — OFFICE VISIT (OUTPATIENT)
Dept: TRANSPLANT | Facility: CLINIC | Age: 57
End: 2017-04-26
Payer: COMMERCIAL

## 2017-04-26 ENCOUNTER — HOSPITAL ENCOUNTER (OUTPATIENT)
Dept: PULMONOLOGY | Facility: CLINIC | Age: 57
Discharge: HOME | End: 2017-04-26
Payer: COMMERCIAL

## 2017-04-26 ENCOUNTER — OFFICE VISIT (OUTPATIENT)
Dept: CARDIOTHORACIC SURGERY | Facility: CLINIC | Age: 57
End: 2017-04-26
Payer: COMMERCIAL

## 2017-04-26 ENCOUNTER — OFFICE VISIT (OUTPATIENT)
Dept: INFECTIOUS DISEASES | Facility: CLINIC | Age: 57
End: 2017-04-26
Payer: COMMERCIAL

## 2017-04-26 VITALS
SYSTOLIC BLOOD PRESSURE: 133 MMHG | TEMPERATURE: 98 F | WEIGHT: 227 LBS | BODY MASS INDEX: 31.73 KG/M2 | BODY MASS INDEX: 31.85 KG/M2 | WEIGHT: 227.5 LBS | HEIGHT: 71 IN | HEIGHT: 71 IN | OXYGEN SATURATION: 92 % | BODY MASS INDEX: 31.78 KG/M2 | DIASTOLIC BLOOD PRESSURE: 72 MMHG | RESPIRATION RATE: 20 BRPM | HEIGHT: 71 IN

## 2017-04-26 VITALS
SYSTOLIC BLOOD PRESSURE: 125 MMHG | WEIGHT: 227 LBS | BODY MASS INDEX: 31.78 KG/M2 | HEIGHT: 71 IN | DIASTOLIC BLOOD PRESSURE: 82 MMHG | HEART RATE: 88 BPM

## 2017-04-26 VITALS
TEMPERATURE: 99 F | HEIGHT: 71 IN | HEART RATE: 100 BPM | BODY MASS INDEX: 31.9 KG/M2 | DIASTOLIC BLOOD PRESSURE: 76 MMHG | OXYGEN SATURATION: 91 % | WEIGHT: 227.88 LBS | SYSTOLIC BLOOD PRESSURE: 128 MMHG

## 2017-04-26 DIAGNOSIS — Z76.82 LUNG TRANSPLANT CANDIDATE: ICD-10-CM

## 2017-04-26 DIAGNOSIS — D86.9 SARCOIDOSIS: ICD-10-CM

## 2017-04-26 DIAGNOSIS — D86.9 SARCOIDOSIS: Primary | ICD-10-CM

## 2017-04-26 DIAGNOSIS — Z76.82 LUNG TRANSPLANT CANDIDATE: Primary | ICD-10-CM

## 2017-04-26 DIAGNOSIS — Z01.818 PRE-TRANSPLANT EVALUATION FOR LUNG TRANSPLANT: ICD-10-CM

## 2017-04-26 DIAGNOSIS — Z76.82 ORGAN TRANSPLANT CANDIDATE: Primary | ICD-10-CM

## 2017-04-26 LAB
PRE FEV1 FVC: 64
PRE FEV1: 1.16
PRE FVC: 1.82
PREDICTED FEV1 FVC: 81
PREDICTED FEV1: 3.74
PREDICTED FVC: 4.6

## 2017-04-26 PROCEDURE — 99999 PR PBB SHADOW E&M-EST. PATIENT-LVL III: CPT | Mod: PBBFAC,TXP,, | Performed by: THORACIC SURGERY (CARDIOTHORACIC VASCULAR SURGERY)

## 2017-04-26 PROCEDURE — 82803 BLOOD GASES ANY COMBINATION: CPT | Mod: S$GLB,TXP,, | Performed by: INTERNAL MEDICINE

## 2017-04-26 PROCEDURE — 94620 PR PULMONARY STRESS TESTING,SIMPLE: CPT | Mod: S$GLB,TXP,, | Performed by: INTERNAL MEDICINE

## 2017-04-26 PROCEDURE — 99999 PR PBB SHADOW E&M-EST. PATIENT-LVL IV: CPT | Mod: PBBFAC,TXP,, | Performed by: NURSE PRACTITIONER

## 2017-04-26 PROCEDURE — 99204 OFFICE O/P NEW MOD 45 MIN: CPT | Mod: 25,S$GLB,TXP, | Performed by: NURSE PRACTITIONER

## 2017-04-26 PROCEDURE — 99499 UNLISTED E&M SERVICE: CPT | Mod: S$GLB,TXP,, | Performed by: INTERNAL MEDICINE

## 2017-04-26 PROCEDURE — 90740 HEPB VACC 3 DOSE IMMUNSUP IM: CPT | Mod: S$GLB,TXP,, | Performed by: NURSE PRACTITIONER

## 2017-04-26 PROCEDURE — 99245 OFF/OP CONSLTJ NEW/EST HI 55: CPT | Mod: S$GLB,TXP,, | Performed by: THORACIC SURGERY (CARDIOTHORACIC VASCULAR SURGERY)

## 2017-04-26 PROCEDURE — 99999 PR PBB SHADOW E&M-EST. PATIENT-LVL III: CPT | Mod: PBBFAC,TXP,, | Performed by: INTERNAL MEDICINE

## 2017-04-26 PROCEDURE — 94010 BREATHING CAPACITY TEST: CPT | Mod: S$GLB,TXP,, | Performed by: INTERNAL MEDICINE

## 2017-04-26 PROCEDURE — 90632 HEPA VACCINE ADULT IM: CPT | Mod: S$GLB,TXP,, | Performed by: NURSE PRACTITIONER

## 2017-04-26 PROCEDURE — 90471 IMMUNIZATION ADMIN: CPT | Mod: S$GLB,TXP,, | Performed by: NURSE PRACTITIONER

## 2017-04-26 PROCEDURE — 36600 WITHDRAWAL OF ARTERIAL BLOOD: CPT | Mod: 59,S$GLB,TXP, | Performed by: INTERNAL MEDICINE

## 2017-04-26 PROCEDURE — 90472 IMMUNIZATION ADMIN EACH ADD: CPT | Mod: S$GLB,TXP,, | Performed by: NURSE PRACTITIONER

## 2017-04-26 PROCEDURE — 1160F RVW MEDS BY RX/DR IN RCRD: CPT | Mod: S$GLB,TXP,, | Performed by: NURSE PRACTITIONER

## 2017-04-26 RX ORDER — BLOOD SUGAR DIAGNOSTIC
STRIP MISCELLANEOUS
Refills: 5 | COMMUNITY
Start: 2017-04-19 | End: 2017-09-26 | Stop reason: SDUPTHER

## 2017-04-26 RX ORDER — LANCETS
EACH MISCELLANEOUS
Refills: 5 | COMMUNITY
Start: 2017-04-19 | End: 2017-11-15

## 2017-04-26 NOTE — MR AVS SNAPSHOT
[facility] - Cardiovascular Surg  [address]  [place] [address]  Phone: [phone]                  Martin Hendrix Jr.   2017 10:00 AM   Appointment    Description:  Male : 1960   Provider:  Carlton Fox MD   Department:  [facility] - Cardiovascular Surg                To Do List           Future Appointments        Provider Department Dept Phone    2017 12:00 PM [facility] NM4 MG2 400LB LIMIT [facility] [phone]    2017 1:00 PM LUNG, TRANS FINANCIAL COORD [facility] Lung Transplant [phone]    2017 1:30 PM LUNG,  TRANSPLANT [facility] Lung Transplant [phone]    2017 8:00 AM [facility] 11 ALL [facility] [phone]    2017 8:45 AM [facility] 11 ALL [facility] [phone]      Your Future Surgeries/Procedures     May 22, 2017   Surgery with Shanell Higuera MD   [facility] ([facility])    [address] [facility] [address] [phone]              Goals (5 Years of Data)     None      [facility]     [facility] Nurse Care Line -  Assistance  Unless otherwise directed by your provider, please contact [facility], our nurse care line that is available for  assistance.     Registered nurses in the [facility] provide: appointment scheduling, clinical advisement, health education, and other advisory services.  Call: [phone] (toll free)               Medications                Verify that the below list of medications is an accurate representation of the medications you are currently taking.  If none reported, the list may be blank. If incorrect, please contact your healthcare provider. Carry this list with you in case of emergency.           Current Medications     acetaminophen (TYLENOL) 325 MG tablet Take 650 mg by mouth every 6 (six) hours as needed.    albuterol sulfate 2.5 mg/0.5 mL  Nebu Inhale 2.5 mg into the lungs 3 (three) times daily as needed.    fluticasone-salmeterol 500-50 mcg/dose (ADVAIR DISKUS) 500-50 mcg/dose DsDv diskus inhaler Inhale 1 puff into the lungs 2 (two) times daily.    furosemide (LASIX) 40 MG tablet Take 1 tablet by mouth once daily.    methotrexate 2.5 MG Tab Take 10 mg by mouth once a week.    multivitamin (THERAGRAN) per tablet Take 1 tablet by mouth once daily.    potassium chloride SA (K-DUR,KLOR-CON) 10 MEQ tablet Take 1 tablet by mouth once daily.    PROAIR HFA 90 mcg/actuation inhaler Inhale 1 puff into the lungs every 6 (six) hours as needed.    tadalafil, PULMONARY HYPERTENSION, (ADCIRCA) 20 mg Tab Take 2 tablets (40 mg total) by mouth once daily.           Clinical Reference Information           Allergies as of 4/26/2017     No Known Allergies      Immunizations Administered on Date of Encounter - 4/26/2017     None      Maintenance Dialysis History     Patient has no recorded history of maintenance dialysis.      Transplant Information        Txp Date Organ Coordinator Care Team     Lung Sybil Estrada RN Referring Physician:  Florentin Corona MD         Language Assistance Services     ATTENTION: Language assistance services are available, free of charge. Please call [phone].      ATENCIÓN: Si nayeli prakash, tiene a smyth disposición servicios gratuitos de asistencia lingüística. Llame al [phone].     [facility] Ý: N?u b?n nói Ti?ng Vi?t, có các d?ch v? h? tr? ngôn ng? mi?n phí dành cho b?n. G?i s? [phone].         [facility] complies with applicable Federal civil rights laws and does not discriminate on the basis of race, color, national origin, age, disability, or sex.

## 2017-04-26 NOTE — LETTER
April 28, 2017        Florentin Corona  [address]  [address]  [facility] [address]  Phone: [phone]  Fax: [phone]             [facility]  [address]  [facility] [address]  Phone: [phone]   Patient: Martin Hendrix Jr.   MR Number: [id number]   YOB: 1960   Date of Visit: 4/26/2017       Dear Dr. Florentin Corona    Thank you for referring Martin Hendrix to me for evaluation. Attached you will find relevant portions of my assessment and plan of care.    If you have questions, please do not hesitate to call me. I look forward to following Martin Hendrix along with you.    Sincerely,    Darrick Wolfe MD    Enclosure    If you would like to receive this communication electronically, please contact [email or web address] or [phone] to request [employer] Link access.    [employer] Link is a tool which provides read-only access to select patient information with whom you have a relationship. Its easy to use and provides real time access to review your patients record including encounter summaries, notes, results, and demographic information.    If you feel you have received this communication in error or would no longer like to receive these types of communications, please e-mail [email or web address]

## 2017-04-26 NOTE — PROGRESS NOTES
Subjective:      Patient ID: Jaye Borden Jr. is a 56 y.o. male.    Chief Complaint:lung transplant (Lung Transplant)      History of Present Illness  HPI     lung transplant    Additional comments: Lung Transplant       Last edited by Kelsey Olivia MA on 4/26/2017 10:59 AM. (History)         Mr. Jaye Borden is a very pleasant 56 y.o. male with sarcoidosis, KRISTYN, and pulmonary hypertension. Recently referred to Dr. Wolfe for for lung transplant evaluation.  He is currently undergoing workup for lung transplant and is here today for Infectious Disease pre-transplant evaluation.      On methotrexate 10 mg weekly.     He denies recent fevers, chills, infective illnesses.     No history of diabetes.  No current or long term steroid use.   Denies splenectomy    Denies history of chronic, recurring infections of sinuses, throat, bladder/kidneys, intestines, skin, reproductive organs, teeth or gums.     History of chickenpox.  Has had episode of shingles.    Denies history of syphilis, gonorrhea, other STDs/viral hepatitis/ or other infective illnesses.     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  No associated illnesses     Animal exposures: pet dog.  Fully vaccinated.   Occupational: , construction  Hobbies: hunting, fishing , gardening  He does not eat raw or undercooked meat, fish, or shellfish.       Immunization history:  Usual childhood vaccines  Flu - fall of 2016  Tdap - 2011  Hepatitis A - unknown  Hepatitis B - denies   Pneumonia - pneumonia vaccine in 2013 - unsure of which one  Shingles vaccine in 2013    Serologies:  Results for JAYE BORDEN JR. (MRN [id number]) as of 4/27/2017 17:08   Ref. Range 4/24/2017 07:31 4/25/2017 08:50 4/26/2017 13:50   Hep B Core Total Ab Unknown Negative     Hep B S Ab Unknown   Negative   Hepatitis B Surface Ag Unknown Negative     Hepatitis C Ab Unknown  Negative     Mitogen - Nil Latest Ref Range: See text IU/mL 0.54     NIL Latest Ref Range: See text IU/mL 0.02     TB Antigen Latest Ref Range: See text IU/mL 0.02     TB Antigen - Nil Latest Ref Range: See text IU/mL -0.01     TB Gold Unknown Negative     HIV 1/2 Ag/Ab Latest Ref Range: Negative   Negative    RPR Latest Ref Range: Non-reactive  Non-reactive     CMV IgG Interpretation Unknown Non-Reactive     Ame-Barr Virus IgG (VCA) Latest Ref Range: Negative  Positive (A)     Varicella IgG Latest Ref Range: 0.00 - 0.90 ISR 4.48 (H)     Varicella Interpretation Latest Ref Range: Negative  Positive (A)       Imaging:    CXR 4/12/17 - Mild interstitial edema versus pneumonia.    CT 11/3/2016 at [place] -   1. Resolution of right upper lobe centrilobular nodules.  2. Scattered bilateral groundglass opacities which may reflect air trapping or pneumonitis. No other detrimental change.  3. Calcified mediastinal and hilar lymph nodes presumably related to patient's history of sarcoidosis.  4. Stable areas of parenchymal and interstitial scarring.  5. Enlargement of the main pulmonary artery which can be seen with pulmonary arterial hypertension.    Review of Systems   Constitution: Negative for chills, decreased appetite, fever, weakness, malaise/fatigue, night sweats, weight gain and weight loss.   HENT: Negative for congestion, ear pain, headaches, hearing loss, hoarse voice, sore throat and tinnitus.    Eyes: Negative for blurred vision, redness and visual disturbance.   Cardiovascular: Positive for leg swelling. Negative for chest pain and palpitations.   Respiratory: Positive for shortness of breath. Negative for cough, hemoptysis, sputum production and wheezing.    Endocrine: Negative for cold intolerance and heat intolerance.   Hematologic/Lymphatic: Negative for adenopathy. Does not bruise/bleed easily.   Skin: Negative for dry skin, itching, rash and suspicious lesions.   Musculoskeletal: Negative for back  pain, joint pain, myalgias and neck pain.   Gastrointestinal: Negative for abdominal pain, constipation, diarrhea, heartburn, nausea and vomiting.   Genitourinary: Negative for dysuria, flank pain, frequency, hematuria, hesitancy and urgency.   Neurological: Negative for dizziness, numbness and paresthesias.   Psychiatric/Behavioral: Negative for depression and memory loss. The patient does not have insomnia and is not nervous/anxious.    Allergic/Immunologic: Negative for environmental allergies, HIV exposure, hives and persistent infections.     Objective:   Physical Exam   Constitutional: He is oriented to person, place, and time. He appears well-developed and well-nourished.   HENT:   Head: Normocephalic and atraumatic.   Mouth/Throat: Uvula is midline, oropharynx is clear and moist and mucous membranes are normal. He does not have dentures. No oral lesions. Normal dentition. No dental abscesses, lacerations or dental caries. No oropharyngeal exudate.   Eyes: Conjunctivae and lids are normal. No scleral icterus.   Neck: Neck supple.   Cardiovascular: Normal rate and regular rhythm.  Exam reveals gallop. Exam reveals no friction rub.    No murmur heard.  Pulmonary/Chest: Effort normal and breath sounds normal. No respiratory distress. He has no decreased breath sounds. He has no wheezes. He has no rhonchi. He has no rales.   O2 by nasal cannula   Abdominal: Soft. Normal appearance and bowel sounds are normal. He exhibits no distension. There is no hepatosplenomegaly. There is no tenderness. There is no rebound and no guarding.   Musculoskeletal: He exhibits edema (bilateral lower extremity edema ).   Lymphadenopathy:        Head (right side): No submental, no submandibular, no tonsillar, no preauricular, no posterior auricular and no occipital adenopathy present.        Head (left side): No submental, no submandibular, no tonsillar, no preauricular, no posterior auricular and no occipital adenopathy present.      He has no cervical adenopathy.     He has no axillary adenopathy.        Right: No inguinal, no supraclavicular and no epitrochlear adenopathy present.        Left: No inguinal, no supraclavicular and no epitrochlear adenopathy present.   Neurological: He is alert and oriented to person, place, and time. No cranial nerve deficit.   Skin: Skin is warm, dry and intact. No lesion and no rash noted. He is not diaphoretic. No erythema. No pallor.   Psychiatric: He has a normal mood and affect. His behavior is normal.   Vitals reviewed.    Assessment/Plan/Counseling       1. Organ transplant candidate    2. Sarcoidosis    3. Lung transplant candidate    4. Pre-transplant evaluation for lung transplant              5. Risks of Chronic Infection:    No unusual risks of infection or significant barriers to transplantation given the available information  were identified from the infectious disease standpoint at this time.  HIV, Quantiferon Gold, RPR, HCV, Hep B SAg are negative.       -  Given history of foreign travel, I have ordered a Strongyloides IgG.   If positive, please refer back to ID for treatment.       6. Immunizations:  The patient's immunization history and serologies were reviewed.  Based on the patients immunization history and serologies, immunizations were ordered:       -  Hepatitis A and Hepatitis B vaccine series initiated.  Rx given for 2nd dose of Hepatitis B in one month (high dose) and 3rd dose in 6 months.    -  If Hep A IgG positive will not need second dose of Hepatitis A   -  Immunization records requested from patient PCP and Pulmonologist to establish which PNA vaccine he has received.  Will determine on receipt if additional vaccination needed.     The patient was encouraged to contact us about any problems that may develop after immunization and possible side effects were reviewed.    7. Counseling: I discussed with the patient the risk for increased susceptibility to infections following  transplantation including increased risk for infection right after transplant and if rejection should occur.  The patient has been counseled on the importance of vaccinations including but not limited to a yearly flu vaccine.    8. Specific guidance has been provided to the patient regarding the patients occupation, hobbies and activities to avoid future infectious complications including but not limited to avoiding undercooked meats and seafood, proper hygiene, and contact with animals.      Final determination of transplant candidacy will be made once evaluation is complete and reviewed by the LungTransplant Selection Committee.

## 2017-04-26 NOTE — LETTER
April 26, 2017      Darrick Wolfe MD  [address]  [facility] [address]           [facility]  [address]  [facility] [address]  Phone: [phone]          Patient: Martin Hendrix Jr.   MR Number: [id number]   YOB: 1960   Date of Visit: 4/26/2017       Dear Dr. Darrick Wolfe:    Thank you for referring Martin Hendrix to me for evaluation. Attached you will find relevant portions of my assessment and plan of care.    If you have questions, please do not hesitate to call me. I look forward to following Martin Hendrix along with you.    Sincerely,    Carlton Fox MD    Enclosure  CC:  No Recipients    If you would like to receive this communication electronically, please contact [email or web address] or [phone] to request more information on [email or web address] Link access.    For providers and/or their staff who would like to refer a patient to [facility], please contact us through our one-stop-shop provider referral line, [facility], at [phone].    If you feel you have received this communication in error or would no longer like to receive these types of communications, please e-mail [email or web address]

## 2017-04-26 NOTE — MR AVS SNAPSHOT
[facility] - Infectious Diseases  [address]  [place] [address]  Phone: [phone]  Fax: [phone]                  Martin Hendrix Jr.   2017 11:00 AM   Office Visit    Description:  Male : 1960   Provider:  CIELO Beavers ANP   Department:  [facility] - Infectious Diseases           Reason for Visit     lung transplant           Diagnoses this Visit        Comments    Organ transplant candidate    -  Primary     Pre-transplant evaluation for lung transplant                To Do List           Future Appointments        Provider Department Dept Phone    2017 7:30 AM LAB, APPOINTMENT [place] [facility] [phone]      Your Future Surgeries/Procedures     May 22, 2017   Surgery with Shanell Higuera MD   [facility] ([facility])    [address] [facility] [address] [phone]              Goals (5 Years of Data)     None      [facility]     [facility] Nurse Care Line - 24 Assistance  Unless otherwise directed by your provider, please contact [facility], our nurse care line that is available for / assistance.     Registered nurses in the [facility] provide: appointment scheduling, clinical advisement, health education, and other advisory services.  Call: [phone] (toll free)               Medications                Verify that the below list of medications is an accurate representation of the medications you are currently taking.  If none reported, the list may be blank. If incorrect, please contact your healthcare provider. Carry this list with you in case of emergency.           Current Medications     acetaminophen (TYLENOL) 325 MG tablet Take 650 mg by mouth every 6 (six) hours as needed.    albuterol sulfate 2.5 mg/0.5 mL Nebu Inhale 2.5 mg into the lungs 3 (three) times daily as needed.    fluticasone-salmeterol 500-50 mcg/dose (ADVAIR DISKUS)  "500-50 mcg/dose DsDv diskus inhaler Inhale 1 puff into the lungs 2 (two) times daily.    furosemide (LASIX) 40 MG tablet Take 1 tablet by mouth once daily.    methotrexate 2.5 MG Tab Take 10 mg by mouth once a week.    multivitamin (THERAGRAN) per tablet Take 1 tablet by mouth once daily.    potassium chloride SA (K-DUR,KLOR-CON) 10 MEQ tablet Take 1 tablet by mouth once daily.    PROAIR HFA 90 mcg/actuation inhaler Inhale 1 puff into the lungs every 6 (six) hours as needed.    tadalafil, PULMONARY HYPERTENSION, (ADCIRCA) 20 mg Tab Take 2 tablets (40 mg total) by mouth once daily.    ACCU-CHEK DEANNE PLUS METER Misc USE AS DIRECTED    ACCU-CHEK DEANNE PLUS TEST STRP Strp CHECK BLOOD GLUCOSE ONCE DAILY    ACCU-CHEK SOFTCLIX LANCETS Misc CHECK BLOOD GLUCOSE ONCE QD           Clinical Reference Information           Your Vitals Were     BP Pulse Height Weight BMI    125/82 (BP Location: Left arm, Patient Position: Sitting, BP Method: Automatic) 88 5' 11" (1.803 m) 103 kg (227 lb) 31.66 kg/m2      Blood Pressure          Most Recent Value    BP  125/82      Allergies as of 4/26/2017     No Known Allergies      Immunizations Administered on Date of Encounter - 4/26/2017     Name Date Dose VIS Date Route    HEPATITIS A  Incomplete 1 mL 7/20/2016 Intramuscular    Hepatitis B, Dialysis, 3 Dose  Incomplete 1 mL 7/20/2016 --      Orders Placed During Today's Visit      Normal Orders This Visit    Hepatitis A Vaccine (Adult) (IM)     Hepatitis B Vaccine Dialysis or Immunosupressed 3-dose IM     Future Labs/Procedures Expected by Expires    Hepatitis A antibody, IgG  4/26/2017 6/25/2018    Hepatitis B surface antibody  4/26/2017 4/26/2018    STRONGYLOIDES IGG ANTIBODIES  4/26/2017 6/25/2018      Maintenance Dialysis History     Patient has no recorded history of maintenance dialysis.      Transplant Information        Txp Date Organ Coordinator Care Team     Lung Lianet Brady RN Referring Physician:  Florentin Corona MD    "      Instructions    1.  Hepatitis A and B vaccines today.   2.  Will check blood work for strongyloides and hepatitis A and B antibody today.   3.  Will get vaccine records from Dr. Corona and Dr Dunham and let you know if you need anything additional.   4.  Call for questions/concerns or problems after vaccine.        Language Assistance Services     ATTENTION: Language assistance services are available, free of charge. Please call [phone].      ATENCIÓN: Si habla español, tiene a smyth disposición servicios gratuitos de asistencia lingüística. Llame al [phone].     [facility] Ý: N?u b?n nói Ti?ng Vi?t, có các d?ch v? h? tr? ngôn ng? mi?n phí dành cho b?n. G?i s? [phone].         [facility] complies with applicable Federal civil rights laws and does not discriminate on the basis of race, color, national origin, age, disability, or sex.

## 2017-04-26 NOTE — LETTER
April 26, 2017      Darrick Wolfe MD  [address]  [facility] [address]           [facility]  [address]  [facility] [address]  Phone: [phone]  Fax: [phone]          Patient: Martin Hendrix Jr.   MR Number: [id number]   YOB: 1960   Date of Visit: 4/26/2017       Dear Dr. Darrick Wolfe:    Thank you for referring Martin Hendrix to me for evaluation. Attached you will find relevant portions of my assessment and plan of care.    If you have questions, please do not hesitate to call me. I look forward to following Martin Hendrix along with you.    Sincerely,    Madhu Franks RN    Enclosure  CC:  No Recipients    If you would like to receive this communication electronically, please contact [email or web address] or [phone] to request more information on [employer] Link access.    For providers and/or their staff who would like to refer a patient to [facility], please contact us through our one-stop-shop provider referral line, [facility], at [phone].    If you feel you have received this communication in error or would no longer like to receive these types of communications, please e-mail [email or web address]

## 2017-04-26 NOTE — PATIENT INSTRUCTIONS
1.  Hepatitis A and B vaccines today.   2.  Will check blood work for strongyloides and hepatitis A and B antibody today.   3.  Will get vaccine records from Dr. Corona and Dr Dunham and let you know if you need anything additional.   4.  Call for questions/concerns or problems after vaccine.

## 2017-04-26 NOTE — PROGRESS NOTES
History & Physical    SUBJECTIVE:     History of Present Illness:  Patient is a 56 y.o. male presents with with past medical history of sarcoidosis, KRISTYN, and pulmonary hypertension( has been seen by Dr. Higuera). He is on 3L of oxygen. He is on BIPAP. His New York Heart Association Class is III and a Karnofsky score of 70. Cares for self; unable to carry on normal activity or active work.      No chief complaint on file.      Review of patient's allergies indicates:  No Known Allergies    Current Outpatient Prescriptions   Medication Sig Dispense Refill    acetaminophen (TYLENOL) 325 MG tablet Take 650 mg by mouth every 6 (six) hours as needed.      albuterol sulfate 2.5 mg/0.5 mL Nebu Inhale 2.5 mg into the lungs 3 (three) times daily as needed.      fluticasone-salmeterol 500-50 mcg/dose (ADVAIR DISKUS) 500-50 mcg/dose DsDv diskus inhaler Inhale 1 puff into the lungs 2 (two) times daily.      furosemide (LASIX) 40 MG tablet Take 1 tablet by mouth once daily.  2    methotrexate 2.5 MG Tab Take 10 mg by mouth once a week.      multivitamin (THERAGRAN) per tablet Take 1 tablet by mouth once daily.      potassium chloride SA (K-DUR,KLOR-CON) 10 MEQ tablet Take 1 tablet by mouth once daily.  2    PROAIR HFA 90 mcg/actuation inhaler Inhale 1 puff into the lungs every 6 (six) hours as needed.  11    tadalafil, PULMONARY HYPERTENSION, (ADCIRCA) 20 mg Tab Take 2 tablets (40 mg total) by mouth once daily. 28 tablet 11     No current facility-administered medications for this visit.        Past Medical History:   Diagnosis Date    On home oxygen therapy     KRISTYN on CPAP     Osteopenia     Pulmonary hypertension     Sarcoidosis     Shortness of breath      Past Surgical History:   Procedure Laterality Date    ABDOMINAL SURGERY      6 weeks old    BRONCHOSCOPY      CHEST TUBE INSERTION      CHOLECYSTECTOMY      LUNG BIOPSY       Family History   Problem Relation Age of Onset    Hypertension Mother      Diabetes Father     Kidney disease Sister     Diabetes Brother      Social History   Substance Use Topics    Smoking status: Never Smoker    Smokeless tobacco: Never Used    Alcohol use No        Review of Systems:  Review of Systems   Constitutional: Positive for activity change. Negative for fatigue and fever.   Respiratory: Positive for shortness of breath. Negative for chest tightness.    Cardiovascular: Negative for chest pain, palpitations and leg swelling.   Gastrointestinal: Negative for abdominal pain, nausea and vomiting.   Endocrine: Negative for polydipsia, polyphagia and polyuria.   Genitourinary: Negative for dysuria.   Musculoskeletal: Negative for gait problem.   Skin: Negative for rash.   Allergic/Immunologic: Negative for immunocompromised state.   Neurological: Negative for dizziness, syncope and weakness.   Hematological: Does not bruise/bleed easily.   Psychiatric/Behavioral: Negative for behavioral problems.       OBJECTIVE:     Vital Signs (Most Recent)              Physical Exam:  Physical Exam   Constitutional: He is oriented to person, place, and time. He appears well-developed and well-nourished.   HENT:   Head: Normocephalic.   Eyes: Pupils are equal, round, and reactive to light.   Cardiovascular: Normal rate, regular rhythm and normal heart sounds.    Pulmonary/Chest: Tachypnea noted. He has decreased breath sounds.   Abdominal: Soft.   Musculoskeletal: Normal range of motion.   Neurological: He is alert and oriented to person, place, and time.   Skin: Skin is warm and dry.   Psychiatric: He has a normal mood and affect.     Diagnostic Results:  Chest CT reviewed     RHC: Severe pulmonary hypertension. Despite administration of NO at 20ppm, 40ppm, and 80ppm, there PA pressure did not signifcantly improve from peak or mean. The cardiac index did improve from 1.96 at baseline to 2.99 L/min with 20ppm. There was no further improvement with   escalating doses.  ASSESSMENT/PLAN:    Patient is a 56 y.o. male presents with with past medical history of sarcoidosis, KRISTYN, and pulmonary hypertension    PLAN: I spoke to them about lung transplant in detail. He is a good candidate for Double Lung.

## 2017-04-27 ENCOUNTER — TELEPHONE (OUTPATIENT)
Dept: TRANSPLANT | Facility: CLINIC | Age: 57
End: 2017-04-27

## 2017-04-27 NOTE — TELEPHONE ENCOUNTER
----- Message from Marian Alford sent at 4/27/2017 10:55 AM CDT -----  Contact: patient  Please call pt at [phone] or [phone]. Pt insurance company sent paperwork to be completed regarding health condition. What is the status?    Thank you

## 2017-04-28 ENCOUNTER — TELEPHONE (OUTPATIENT)
Dept: TRANSPLANT | Facility: HOSPITAL | Age: 57
End: 2017-04-28

## 2017-04-28 ENCOUNTER — TELEPHONE (OUTPATIENT)
Dept: TRANSPLANT | Facility: CLINIC | Age: 57
End: 2017-04-28

## 2017-05-01 ENCOUNTER — PATIENT MESSAGE (OUTPATIENT)
Dept: TRANSPLANT | Facility: CLINIC | Age: 57
End: 2017-05-01

## 2017-05-02 ENCOUNTER — DOCUMENTATION ONLY (OUTPATIENT)
Dept: PHARMACY | Facility: HOSPITAL | Age: 57
End: 2017-05-02

## 2017-05-02 ENCOUNTER — TELEPHONE (OUTPATIENT)
Dept: TRANSPLANT | Facility: CLINIC | Age: 57
End: 2017-05-02

## 2017-05-02 ENCOUNTER — COMMITTEE REVIEW (OUTPATIENT)
Dept: TRANSPLANT | Facility: CLINIC | Age: 57
End: 2017-05-02

## 2017-05-02 DIAGNOSIS — I27.0 PRIMARY PULMONARY HYPERTENSION: ICD-10-CM

## 2017-05-02 RX ORDER — TADALAFIL 20 MG/1
40 TABLET ORAL DAILY
Qty: 42 TABLET | Refills: 11 | COMMUNITY
Start: 2017-05-02 | End: 2017-05-22 | Stop reason: SDUPTHER

## 2017-05-02 NOTE — PROGRESS NOTES
PHARM.D. PRE-TRANSPLANT NOTE:    Mr. Salazar is a 56 YOM being evaluated for lung transplant secondary to sarcoidosis, KRISTYN, and pulmonary HTN.  NYHA Class III.  Karnofsky score of 70.  He is not a diabetic.      This patient's medication therapy was evaluated as part of his pre-transplant evaluation.        Current Outpatient Prescriptions   Medication Sig Dispense Refill    ACCU-CHEK DEANNE PLUS METER Misc USE AS DIRECTED  0    ACCU-CHEK DEANNE PLUS TEST STRP Strp CHECK BLOOD GLUCOSE ONCE DAILY  5    ACCU-CHEK SOFTCLIX LANCETS Misc CHECK BLOOD GLUCOSE ONCE QD  5    acetaminophen (TYLENOL) 325 MG tablet Take 650 mg by mouth every 6 (six) hours as needed.      albuterol sulfate 2.5 mg/0.5 mL Nebu Inhale 2.5 mg into the lungs 3 (three) times daily as needed.      fluticasone-salmeterol 500-50 mcg/dose (ADVAIR DISKUS) 500-50 mcg/dose DsDv diskus inhaler Inhale 1 puff into the lungs 2 (two) times daily.      furosemide (LASIX) 40 MG tablet Take 1 tablet by mouth once daily.  2    methotrexate 2.5 MG Tab Take 10 mg by mouth once a week.      multivitamin (THERAGRAN) per tablet Take 1 tablet by mouth once daily.      potassium chloride SA (K-DUR,KLOR-CON) 10 MEQ tablet Take 1 tablet by mouth once daily.  2    PROAIR HFA 90 mcg/actuation inhaler Inhale 1 puff into the lungs every 6 (six) hours as needed.  11    tadalafil, PULMONARY HYPERTENSION, (ADCIRCA) 20 mg Tab Take 2 tablets (40 mg total) by mouth once daily. 42 tablet 11     No current facility-administered medications for this visit.        Currently, patient is responsible for preparing / administering this patient's medications on a daily basis.  I am available for consultation and can be contacted, as needed by the other members of the Lung Transplant team.

## 2017-05-02 NOTE — COMMITTEE REVIEW
Native Organ Dx: Sarcoidosis    Deferred:  Mr. Martin Hendrix was presented to Selection Committee meeting today for a diagnosis of sarcoidosis with pulmonary hypertension.  All members present agreed that Mr. Hendrix is not a candidate for lung transplantation at this time due to insurance and financial issues.  Mr. Hendrix's insurance may change since he is no longer on short term disability.  Need to have a definitive insurance / financial plan before a final determination regarding his transplant candidacy can be made.  Also it was recommended that he lose weight since his current BMI is 33.        I was present at the meeting and agree with the action stated above.

## 2017-05-02 NOTE — PROCEDURES
Martin Hendrix Jr. is a 56 y.o.  male patient, who presents for a 6 minute walk test ordered by Osmar Wolfe MD.  The diagnosis is Pre Lung Transplant Evaluation; Sarcoidosis; Pulmonary Hypertension.  The patient's BMI is 31.8 kg/m2.  Predicted distance (lower limit of normal) is 433.84 meters.      Test Results:    The test was completed without stopping.  The total time walked was 360 seconds.  During walking, the patient reported:  No complaints. The patient used supplemental oxygen during testing.     04/26/2017---------Distance: 327.66 meters (1075 feet)     O2 Sat % Supplemental Oxygen Heart Rate Blood Pressure Eugenio Scale   Pre-exercise  (Resting) 91 % 3 L/M 113 bpm 133/80 mmHg 2   During Exercise 71 % 3 L/M 146 bpm 123/68 mmHg 5-6   Post-exercise  (Recovery) 91 % 3 L/M  103 bpm       Recovery Time: 326 seconds    Performing nurse/tech:  AIDAN Vale      PREVIOUS STUDY:   02/06/2017---------Distance: 329.18 meters (1080 feet)       O2 Sat % Supplemental Oxygen Heart Rate Blood Pressure Eugenio Scale   Pre-exercise  (Resting) 95 % 6 L/M 109 bpm 121/88 mmHg 0   During Exercise 80 % 6 L/M 146 bpm 133/88 mmHg 4   Post-exercise  (Recovery) 93 % 6 L/M  122 bpm   mmHg         CLINICAL INTERPRETATION:  Six minute walk distance is 327.66 meters (1075 feet) with heavy dyspnea.  During exercise, there was significant desaturation while breathing supplemental oxygen.  Blood pressure remained stable and Heart rate increased significantly with walking.  Tachycardia was present prior to exercise.  This may represent a tachycardic response to exercise.  The patient did not report non-pulmonary symptoms during exercise.  Since the previous study in February 2017, exercise capacity is unchanged.  Based upon age and body mass index, exercise capacity is less than predicted.

## 2017-05-04 ENCOUNTER — TELEPHONE (OUTPATIENT)
Dept: TRANSPLANT | Facility: CLINIC | Age: 57
End: 2017-05-04

## 2017-05-04 NOTE — TELEPHONE ENCOUNTER
Contacted Mr. Hendrix.  Notified him of the outcome of the Selection Committee meeting.  Informed him that we need to have a definitive plan regarding his insurance before we can make a final determination regarding his transplant candidacy.  Instructed him to contact me once he knows whether he will keep his current insurance or need to look for another insurance policy.  He verbalized his understanding and stated that he spoke with Yahaira, the , yesterday.  Also informed him that he needs to lose weight.  Informed him that if he gains weight, then he will not be a candidate for lung transplant.  He again verbalized his understanding.  Informed him that we will continue to follow him in clinic.  Informed him that he will receive a letter in the mail regarding our decision, and he can be evaluated at other transplant centers if he doesn't agree with our decision.  He verbalized complete understanding of all discussed.

## 2017-05-11 ENCOUNTER — TELEPHONE (OUTPATIENT)
Dept: TRANSPLANT | Facility: CLINIC | Age: 57
End: 2017-05-11

## 2017-05-11 NOTE — TELEPHONE ENCOUNTER
Informed per Dr. Wolfe that he received a call from Dr. Corona regarding patient's deferment for lung transplant.  Dr. Wolfe was informed that Mr. Hendrix was unclear as to the reason he was deferred for lung transplant.  He requested that I contact him again to discuss the reason for the deferment.    Contacted Mr. Hendrix.  Informed him that Dr. Corona contacted Dr. Wolfe to inform him that he was unclear as to the reasoning behind his deferment for lung transplant.  Informed Mr. Hendrix that he is in the process of receiving long term disability and we are uncertain as to the amount he will receive with long term disability.  Informed him that we don't know how much COBRA will cost and whether he will be able to afford COBRA, or whether he will need to obtain a different insurance policy.  Informed him that since we don't have a clear plan regarding his insurance policy and the long term disability, we don't know if he will have adequate funds to continue with his current policy.  Informed him that we need to have a clear picture, so we don't set him up for failure after transplant.  He verbalized his understanding and stated that he wanted to ensure that he was doing all that he can do.  He stated that he will receive a call next week regarding how much he will receive for long term disability.  He also has been investigating costs of individual insurance policies in case he has to switch insurances.  He also stated that his daughter's mother-in-law is planning on having a  for him to help raise funds in [place].  Informed Mr. Hendrix that he is doing what he has to do in order to receive answers regarding our concerns.  Instructed him to contact us once he knows what his long term disability income will be and whether he will keep his current insurance or not.  He verbalized his understanding and stated that he would keep us updated.

## 2017-05-22 ENCOUNTER — LAB VISIT (OUTPATIENT)
Dept: LAB | Facility: HOSPITAL | Age: 57
End: 2017-05-22
Attending: INTERNAL MEDICINE
Payer: COMMERCIAL

## 2017-05-22 ENCOUNTER — HOSPITAL ENCOUNTER (OUTPATIENT)
Facility: HOSPITAL | Age: 57
Discharge: HOME | End: 2017-05-22
Attending: INTERNAL MEDICINE | Admitting: INTERNAL MEDICINE
Payer: COMMERCIAL

## 2017-05-22 ENCOUNTER — SURGERY (OUTPATIENT)
Age: 57
End: 2017-05-22

## 2017-05-22 DIAGNOSIS — Z76.82 AWAITING ORGAN TRANSPLANT STATUS: ICD-10-CM

## 2017-05-22 DIAGNOSIS — I27.29 OTHER SECONDARY PULMONARY HYPERTENSION: ICD-10-CM

## 2017-05-22 DIAGNOSIS — Z79.01 ANTICOAGULATION MONITORING, SPECIAL RANGE: ICD-10-CM

## 2017-05-22 DIAGNOSIS — I27.0 PRIMARY PULMONARY HYPERTENSION: ICD-10-CM

## 2017-05-22 LAB
ALBUMIN SERPL BCP-MCNC: 3.9 G/DL
ALP SERPL-CCNC: 86 U/L
ALT SERPL W/O P-5'-P-CCNC: 20 U/L
ANION GAP SERPL CALC-SCNC: 7 MMOL/L
AST SERPL-CCNC: 18 U/L
BASOPHILS # BLD AUTO: 0.06 K/UL
BASOPHILS NFR BLD: 0.8 %
BILIRUB SERPL-MCNC: 0.7 MG/DL
BUN SERPL-MCNC: 12 MG/DL
CALCIUM SERPL-MCNC: 9.3 MG/DL
CHLORIDE SERPL-SCNC: 100 MMOL/L
CO2 SERPL-SCNC: 33 MMOL/L
CREAT SERPL-MCNC: 0.9 MG/DL
DIFFERENTIAL METHOD: ABNORMAL
EOSINOPHIL # BLD AUTO: 0.5 K/UL
EOSINOPHIL NFR BLD: 6.2 %
ERYTHROCYTE [DISTWIDTH] IN BLOOD BY AUTOMATED COUNT: 12.9 %
EST. GFR  (AFRICAN AMERICAN): >60 ML/MIN/1.73 M^2
EST. GFR  (NON AFRICAN AMERICAN): >60 ML/MIN/1.73 M^2
GLUCOSE SERPL-MCNC: 134 MG/DL
HCT VFR BLD AUTO: 43.6 %
HGB BLD-MCNC: 14.3 G/DL
INR PPP: 1
LYMPHOCYTES # BLD AUTO: 0.7 K/UL
LYMPHOCYTES NFR BLD: 9.6 %
MCH RBC QN AUTO: 31 PG
MCHC RBC AUTO-ENTMCNC: 32.8 %
MCV RBC AUTO: 94 FL
MONOCYTES # BLD AUTO: 0.9 K/UL
MONOCYTES NFR BLD: 11.3 %
NEUTROPHILS # BLD AUTO: 5.5 K/UL
NEUTROPHILS NFR BLD: 72 %
PLATELET # BLD AUTO: 299 K/UL
PMV BLD AUTO: 9 FL
POTASSIUM SERPL-SCNC: 4.1 MMOL/L
PROT SERPL-MCNC: 8.1 G/DL
PROTHROMBIN TIME: 10.5 SEC
RBC # BLD AUTO: 4.62 M/UL
SODIUM SERPL-SCNC: 140 MMOL/L
WBC # BLD AUTO: 7.59 K/UL

## 2017-05-22 PROCEDURE — 36415 COLL VENOUS BLD VENIPUNCTURE: CPT | Mod: TXP

## 2017-05-22 PROCEDURE — 85025 COMPLETE CBC W/AUTO DIFF WBC: CPT | Mod: TXP

## 2017-05-22 PROCEDURE — 85610 PROTHROMBIN TIME: CPT | Mod: TXP

## 2017-05-22 PROCEDURE — C1894 INTRO/SHEATH, NON-LASER: HCPCS | Mod: TXP

## 2017-05-22 PROCEDURE — 93451 RIGHT HEART CATH: CPT | Mod: 26,TXP,, | Performed by: INTERNAL MEDICINE

## 2017-05-22 PROCEDURE — 80053 COMPREHEN METABOLIC PANEL: CPT | Mod: TXP

## 2017-05-22 PROCEDURE — 25000003 PHARM REV CODE 250: Mod: TXP

## 2017-05-22 RX ORDER — TADALAFIL 20 MG/1
40 TABLET ORAL DAILY
Qty: 28 TABLET | Refills: 11 | COMMUNITY
Start: 2017-05-22 | End: 2017-06-06 | Stop reason: SDUPTHER

## 2017-05-22 NOTE — DISCHARGE INSTRUCTIONS

## 2017-05-22 NOTE — BRIEF OP NOTE
Date of admit to cath lab: 5/22/2017      Date of discharge from cath lab: 5/22/2017      Principal diagnosis: PH/chronic resp failure    Discharge attending physician: Shanell Higuera MD    Hospital Course/Outcome of the treatment, procedures or surgery: Pt admitted for RHC.   See CVIS/cath lab procedure report in EPIC  for full report of today's procedure.    Disposition of the case (d/c disposition): Home    Discharge Medication List: see med card    Plan for follow up care, diet, activity level: F/U as scheduled. Resume low Na diet.  Activity as tolerated    Condition on discharge from Cath lab: Stable.

## 2017-05-22 NOTE — H&P
HPI      57 yo Wm with Sarcoid Diagnosed in  by biopsy, Currently on On MTX 10 mg/week for long time, Advair bid, and Albuterol prn (prior to this tried pred, and ?thalidomide).  Referred by Rosemary Corona and Yaneth for management of PAH. Reports his breathing really started bothering him around  of last year. Was told at that time to quit work and start wearing o2,  He tried to cont working Dec- and he couldn't do it, so he had to give it  Up. Normally likes to keep active. Since he's been home, thinks the amount he can do before getting winded is even less. Says his O2 runs between 88-90% a lot of the time (has a pulse ox at home) he does not get SOB getting dressed or showering (does this without his o2) but then puts it on to walk around the house, make his bed, but he has to pace himself.  He does swell, but says it's better since he's been on O2, takes 40mg lasix daily- held it this am for the visit here so he wouldn't have to run to the BR. No CP, LH, presyncope or syncope-   wears his CPAP.    Now here for RHC to reassess PAP on adcirca     SH: nonsmoker, little Etoh, no Illicit     FH: dad had COPD and  with CHF  Mom has HTN, 73yo, implant loop recorder           Six Minute Walk Test:   122  m (   m in    )                                              O2 sat  89 ->66  %                  With 8L O2     sats improved to 96% rest and 90% walking                                      -> 140                                                                 BP  144 / 90  ->161 /78                                                         Eugenio   3  -> 5-6     Echo        TTE: OSH   Interpretation Summary  Ejection Fraction = 40-45%.  RSVP= 96 mm/hg  Left ventricular systolic function is mildly reduced.  A variety of Doppler measurements indicate impaired left ventricular  relaxation, which is associated with grade I/IV or mild diastolic dysfunction.        KRISTYN AHI 9 with taina sat  74%  On BPAP at home with oxygen 5 LPMs     PFTs: FEV1 30%, TLC 47%, and DLCO 44%      CT chest result from OSH   1. Resolution of right upper lobe centrilobular nodules.  2. Scattered bilateral groundglass opacities which may reflect air trapping or pneumonitis. No other detrimental change.  3. Calcified mediastinal and hilar lymph nodes presumably related to patient's history of sarcoidosis.  4. Stable areas of parenchymal and interstitial scarring.  5. Enlargement of the main pulmonary artery which can be seen with pulmonary arterial hypertension.     R/LHC 2/17/17  After NO inhalation 20 ppm:  RA:  (24)  RV: 95/20  PW:  (20)  PA: 93/42 (61)      After NO inhalation 40 ppm:  PA: 84/35 (59)  PW:  (33)  AO_SAT: 94  LVP: 130  LVPEDP: 20  AOP: 132/93    After NO inhalation 60 ppm  PA: 84/36 (58)  PA_SAT: 73               The LM is normal. There is ALENA 3 flow.     -       The LAD is normal. There is ALENA 3 flow.     -       The LCX is normal. There is ALENA 3 flow.     -       The RCA is normal. There is ALENA 3 flow.   ECG 2/17/17  Normal sinus rhythm  Right axis deviation  Incomplete right bundle branch block  Probable Right ventricular hypertrophy with repolarization abnormality  Nonspecific T wave abnormality     Review of Systems   Constitution: Negative for chills, fever, malaise/fatigue and weight gain.   HENT: Negative.    Eyes: Negative.    Cardiovascular: Positive for dyspnea on exertion and leg swelling. Negative for chest pain, near-syncope, orthopnea, palpitations, paroxysmal nocturnal dyspnea and syncope.   Respiratory: Negative for cough and shortness of breath.    Endocrine: Negative.    Skin: Negative.    Musculoskeletal: Negative.    Gastrointestinal: Negative for bloating, abdominal pain and change in bowel habit.   Neurological: Negative for dizziness and light-headedness.   Psychiatric/Behavioral: Negative for depression.         Objective:      Physical Exam   Constitutional: He is oriented  to person, place, and time. He appears well-developed and well-nourished.   HENT:   Head: Normocephalic and atraumatic.   Eyes: Right eye exhibits no discharge. Left eye exhibits no discharge.   Neck: Neck supple. No JVD present. No thyromegaly present.   Cardiovascular: Normal rate.  Exam reveals gallop. Exam reveals no friction rub.    No murmur heard.  Tachy, + S3, loud S2     Pulmonary/Chest: Effort normal and breath sounds normal. No respiratory distress. He has no wheezes. He has no rales.   Abdominal: Soft. Bowel sounds are normal. He exhibits no distension. There is no tenderness.   Musculoskeletal: Normal range of motion. He exhibits edema. He exhibits no tenderness.   1+ pitting edema at ankles   Neurological: He is alert and oriented to person, place, and time. No cranial nerve deficit. Coordination normal.   Skin: Skin is warm and dry. No rash noted.   Psychiatric: He has a normal mood and affect. Judgment and thought content normal.          Lab Results   Component Value Date    BNP 83 04/24/2017     05/22/2017    K 4.1 05/22/2017    MG 1.9 04/24/2017     05/22/2017    CO2 33 (H) 05/22/2017    BUN 12 05/22/2017    CREATININE 0.9 05/22/2017     (H) 05/22/2017    HGBA1C 6.7 (H) 04/24/2017    AST 18 05/22/2017    ALT 20 05/22/2017    ALBUMIN 3.9 05/22/2017    PROT 8.1 05/22/2017    BILITOT 0.7 05/22/2017    CHOL 208 (H) 04/24/2017    HDL 47 04/24/2017    LDLCALC 146.2 04/24/2017    TRIG 74 04/24/2017       Magnesium   Date Value Ref Range Status   04/24/2017 1.9 1.6 - 2.6 mg/dL Final       Lab Results   Component Value Date    WBC 7.59 05/22/2017    HGB 14.3 05/22/2017    HCT 43.6 05/22/2017    MCV 94 05/22/2017     05/22/2017       Lab Results   Component Value Date    INR 1.0 05/22/2017    INR 0.9 04/24/2017    INR 1.0 02/14/2017       BNP   Date Value Ref Range Status   04/24/2017 83 0 - 99 pg/mL Final     Comment:     Values of less than 100 pg/ml are consistent with non-CHF  populations.   04/03/2017 180 (H) 0 - 99 pg/mL Final     Comment:     Values of less than 100 pg/ml are consistent with non-CHF populations.       No results found for: LDH                    Assessment:       1. Secondary pulmonary hypertension- in setting of sarcoidosis- severe by RHC, with RVE and depressed RV fxn on echo- has MILD diastolic dysfunction by echo, suspect the elevated PCWP is due to RVE and FADIA impairing ability of left heart to fill, although sarcoid can certainly affect the heart and cause both systolic and diastolic dysfxn. Mild volume overload on exam, with mildly increased BNP   2. Sarcoidosis    3. Lung transplant candidate    4. Chronic respiratory failure with hypoxia    5. Chronic pulmonary heart disease       WHO Group:5  Functional Class3     Plan:     RHC to reassess PAP and filling pressures on adcirca     RHC R IJ, 7 Fr sheath with local lidocaine, micropuncture kit and US guidance.    I have explained the risks, benefits and alternatives of the procedure in detail. The patient voices understanding and all questions have been answered,. The patient agrees to proceed as planned.

## 2017-05-26 DIAGNOSIS — D86.9 PULMONARY HYPERTENSION ASSOCIATED WITH SARCOIDOSIS: ICD-10-CM

## 2017-05-26 DIAGNOSIS — I27.29 PULMONARY HYPERTENSION ASSOCIATED WITH SARCOIDOSIS: ICD-10-CM

## 2017-05-26 DIAGNOSIS — Z76.82 AWAITING ORGAN TRANSPLANT STATUS: Primary | ICD-10-CM

## 2017-05-26 DIAGNOSIS — D86.0 SARCOIDOSIS OF LUNG: ICD-10-CM

## 2017-06-05 ENCOUNTER — PATIENT MESSAGE (OUTPATIENT)
Dept: TRANSPLANT | Facility: CLINIC | Age: 57
End: 2017-06-05

## 2017-06-06 ENCOUNTER — TELEPHONE (OUTPATIENT)
Dept: TRANSPLANT | Facility: CLINIC | Age: 57
End: 2017-06-06

## 2017-06-06 DIAGNOSIS — I27.0 PRIMARY PULMONARY HYPERTENSION: ICD-10-CM

## 2017-06-06 RX ORDER — TADALAFIL 20 MG/1
40 TABLET ORAL DAILY
Qty: 28 TABLET | Refills: 11 | Status: ON HOLD | COMMUNITY
Start: 2017-06-06 | End: 2017-08-30 | Stop reason: ALTCHOICE

## 2017-06-08 ENCOUNTER — TELEPHONE (OUTPATIENT)
Dept: TRANSPLANT | Facility: CLINIC | Age: 57
End: 2017-06-08

## 2017-06-09 DIAGNOSIS — I27.9 CHRONIC PULMONARY HEART DISEASE: Primary | ICD-10-CM

## 2017-06-14 ENCOUNTER — HOSPITAL ENCOUNTER (OUTPATIENT)
Dept: PULMONOLOGY | Facility: CLINIC | Age: 57
Discharge: HOME | End: 2017-06-14
Payer: COMMERCIAL

## 2017-06-14 ENCOUNTER — OFFICE VISIT (OUTPATIENT)
Dept: TRANSPLANT | Facility: CLINIC | Age: 57
End: 2017-06-14
Payer: COMMERCIAL

## 2017-06-14 VITALS
DIASTOLIC BLOOD PRESSURE: 76 MMHG | OXYGEN SATURATION: 93 % | WEIGHT: 225 LBS | HEIGHT: 70 IN | BODY MASS INDEX: 32.21 KG/M2 | SYSTOLIC BLOOD PRESSURE: 126 MMHG | TEMPERATURE: 98 F | HEIGHT: 70 IN | BODY MASS INDEX: 32.21 KG/M2 | RESPIRATION RATE: 20 BRPM | HEART RATE: 96 BPM | WEIGHT: 225 LBS

## 2017-06-14 DIAGNOSIS — D86.9 PULMONARY HYPERTENSION ASSOCIATED WITH SARCOIDOSIS: ICD-10-CM

## 2017-06-14 DIAGNOSIS — D86.0 SARCOIDOSIS OF LUNG: ICD-10-CM

## 2017-06-14 DIAGNOSIS — Z76.82 AWAITING ORGAN TRANSPLANT STATUS: ICD-10-CM

## 2017-06-14 DIAGNOSIS — I27.29 PULMONARY HYPERTENSION ASSOCIATED WITH SARCOIDOSIS: ICD-10-CM

## 2017-06-14 DIAGNOSIS — D86.9 SARCOIDOSIS: ICD-10-CM

## 2017-06-14 DIAGNOSIS — Z76.82 LUNG TRANSPLANT CANDIDATE: Primary | ICD-10-CM

## 2017-06-14 LAB
PRE FEV1 FVC: 63
PRE FEV1: 1.43
PRE FVC: 2.26
PREDICTED FEV1 FVC: 81
PREDICTED FEV1: 3.74
PREDICTED FVC: 4.6

## 2017-06-14 PROCEDURE — 99999 PR PBB SHADOW E&M-EST. PATIENT-LVL III: CPT | Mod: PBBFAC,TXP,, | Performed by: INTERNAL MEDICINE

## 2017-06-14 PROCEDURE — 94620 PR PULMONARY STRESS TESTING,SIMPLE: CPT | Mod: S$GLB,TXP,, | Performed by: INTERNAL MEDICINE

## 2017-06-14 PROCEDURE — 99213 OFFICE O/P EST LOW 20 MIN: CPT | Mod: 25,S$GLB,TXP, | Performed by: INTERNAL MEDICINE

## 2017-06-14 PROCEDURE — 94010 BREATHING CAPACITY TEST: CPT | Mod: S$GLB,TXP,, | Performed by: INTERNAL MEDICINE

## 2017-06-14 RX ORDER — AMBRISENTAN 5 MG/1
5 TABLET, FILM COATED ORAL DAILY
COMMUNITY
Start: 2017-06-08 | End: 2017-06-28 | Stop reason: SDUPTHER

## 2017-06-14 NOTE — PROGRESS NOTES
LUNG TRANSPLANT PRE FOLLOW-UP    Referring Physician: Florentin Corona    Reason for Visit:  Pre-lung transplant follow-up.         Date of Initial Evaluation: 4/24/2017                                                                                             History of Present Illness: Martin Hendrix Jr. is a 56 y.o. male who is on 3L of oxygen. He is on CPAP.  His New York Heart Association Class is III and a Karnofsky score of 70% - Cares for self: Unable to carry on normal activity or active work. He is not diabetic.  Has a hx of Sarcoidosis with pulmonary HTN followed by Dr. Higuera.  Had a recent RHC and was started on Tadalafil and is scheduled to start Letairis once he receives it.  States he has noticed an improvement in his symptoms since starting the medication.  Is able to preform all ADLs without significant dyspnea.  He wears his supplemental oxygen and CPAP as directed.  Denies any lower extremity edema.  No recent illnesses or hospitalizations.  He is not currently working and is supported by long term disability and social security payments.  Remains on his employers insurance and is planning on obtaining COBRA when his plan expires.    Review of Systems   Constitutional: Negative for chills and fever.   HENT: Negative for hearing loss.    Eyes: Negative for blurred vision, double vision, photophobia and pain.   Respiratory: Positive for cough and shortness of breath. Negative for hemoptysis, sputum production and wheezing.    Cardiovascular: Negative for chest pain, palpitations, orthopnea, leg swelling and PND.   Gastrointestinal: Negative for heartburn, nausea and vomiting.   Genitourinary: Negative for dysuria and urgency.   Skin: Negative for itching and rash.   Neurological: Negative for dizziness, tingling and headaches.   Endo/Heme/Allergies: Negative for environmental allergies. Does not bruise/bleed easily.   Psychiatric/Behavioral: Negative for depression, memory loss and suicidal  "ideas. The patient does not have insomnia.         Objective:   /76 (BP Location: Left arm, Patient Position: Sitting)   Pulse 96   Temp 98.1 °F (36.7 °C) (Oral)   Resp 20   Ht 5' 10" (1.778 m)   Wt 102.1 kg (225 lb)   SpO2 (!) 93% Comment: 3 liters pulsed  BMI 32.28 kg/m²     Physical Exam   Constitutional: He is oriented to person, place, and time and well-developed, well-nourished, and in no distress. No distress.   HENT:   Head: Normocephalic and atraumatic.   Mouth/Throat: Oropharynx is clear and moist. No oropharyngeal exudate.   Eyes: Conjunctivae are normal. Pupils are equal, round, and reactive to light. No scleral icterus.   Neck: Normal range of motion. Neck supple. No tracheal deviation present. No thyromegaly present.   Cardiovascular: Normal rate, regular rhythm, normal heart sounds and intact distal pulses.  Exam reveals no gallop and no friction rub.    No murmur heard.  Loud P2 positive   Pulmonary/Chest: Effort normal. No respiratory distress. He has no wheezes. He has rales.   Abdominal: Soft. Bowel sounds are normal. He exhibits no distension. There is no tenderness.   Musculoskeletal: Normal range of motion. He exhibits no edema or tenderness.   Lymphadenopathy:     He has no cervical adenopathy.   Neurological: He is alert and oriented to person, place, and time. No cranial nerve deficit. Gait normal. GCS score is 15.   Skin: Skin is warm and dry. No rash noted. He is not diaphoretic. No erythema.   Psychiatric: Mood and affect normal.        Labs:  No visits with results within 7 Day(s) from this visit.   Latest known visit with results is:   Lab Visit on 05/22/2017   Component Date Value    Sodium 05/22/2017 140     Potassium 05/22/2017 4.1     Chloride 05/22/2017 100     CO2 05/22/2017 33*    Glucose 05/22/2017 134*    BUN, Bld 05/22/2017 12     Creatinine 05/22/2017 0.9     Calcium 05/22/2017 9.3     Total Protein 05/22/2017 8.1     Albumin 05/22/2017 3.9     Total " Bilirubin 05/22/2017 0.7     Alkaline Phosphatase 05/22/2017 86     AST 05/22/2017 18     ALT 05/22/2017 20     Anion Gap 05/22/2017 7*    eGFR if African American 05/22/2017 >60.0     eGFR if non African Amer* 05/22/2017 >60.0     WBC 05/22/2017 7.59     RBC 05/22/2017 4.62     Hemoglobin 05/22/2017 14.3     Hematocrit 05/22/2017 43.6     MCV 05/22/2017 94     MCH 05/22/2017 31.0     MCHC 05/22/2017 32.8     RDW 05/22/2017 12.9     Platelets 05/22/2017 299     MPV 05/22/2017 9.0*    Gran # 05/22/2017 5.5     Lymph # 05/22/2017 0.7*    Mono # 05/22/2017 0.9     Eos # 05/22/2017 0.5     Baso # 05/22/2017 0.06     Gran% 05/22/2017 72.0     Lymph% 05/22/2017 9.6*    Mono% 05/22/2017 11.3     Eosinophil% 05/22/2017 6.2     Basophil% 05/22/2017 0.8     Differential Method 05/22/2017 Automated     Prothrombin Time 05/22/2017 10.5     INR 05/22/2017 1.0        Pulmonary Function Tests 6/14/2017 4/26/2017 2/6/2017   FVC 2.26 1.82 1.78   FEV1 1.03 1.16 1   TLC (liters) - - 3.01   DLCO (ml/mmHg sec) - - 11.9   FVC% 49 38 41   FEV1% 1.43 30 1.05   FEF 25-75 0.76 0.58 0.46   FEF 25-75% 20 15 13   TLC% - - 47   RV - - 1.18   RV% - - 54   DLCO% - - 44     Other: 6MWT today: walked 366m with lowest SaO2 of 74% on 3L NC.  Recovered to SaO2 of 91%.  Eugenio dyspnea rating of 4 post exercise.    Assessment:-  1. Lung transplant candidate    2. Sarcoidosis    3. Pulmonary hypertension associated with sarcoidosis      Plan:   1. FVC has increased 20% today from previous.  No DLCO today.  Continues to have desaturations into the 70's with 6MWT.  Due to his PH 2/2 sarcoidosis he would benefit from lung transplantation.  His current barriers include financial concerns (he is currently on disability and is planning for obtaining COBRA) and weight (current BMI 33).  It is recommended he continue with diet and exercise for weight loss.  Recommended looking into Healthcare.gov for insurance quotes.  He has recently  started Tadalafil and is scheduled to start Letairis.  Will continue to defer listing and will continue to monitor closely.    2. Continue with MTX, Advair, and prn Albuterol.    3. Follows with Dr. Higuera in pulmonary hypertension clinic.  Has had a recent RHC.  Currently on Tadalafil and will begin with Letairis once it's received.  Symptomatically improving.    4. Follow-up in 3 months or earlier if needed.    Samantha Bill DO  PCCM Fellow  [facility]    Attending Note:    I have seen and evaluated the patient with the fellow. Their note reflects the content of our discussion and my plan of care. Plan is to consider him for listing when his insurance issues are settled.      Darrick Wolfe MD  Pulmonary/Critical Care Medicine

## 2017-06-14 NOTE — PROGRESS NOTES
Mr. Hendrix stated that he still has his insurance through his employer and he plans on getting COBRA if he has to.  He also stated that he will be eligible for his disability in August and will get the first check in September.  He requested that Yahaira, , contact him regarding an update.

## 2017-06-14 NOTE — LETTER
June 14, 2017        Florentin Corona  [address] [place]  [address]  [facility] [address]  Phone: [phone]  Fax: [phone]             [facility]  [address]  [facility] [address]  Phone: [phone]   Patient: Martin Hendrix Jr.   MR Number: [id number]   YOB: 1960   Date of Visit: 6/14/2017       Dear Dr. Florentin Corona    Thank you for referring Martin Hendrix to me for evaluation. Attached you will find relevant portions of my assessment and plan of care.    If you have questions, please do not hesitate to call me. I look forward to following Martin Hendrix along with you.    Sincerely,    Darrick Wolfe MD    Enclosure    If you would like to receive this communication electronically, please contact [email or web address] or [phone] to request [employer] Link access.    [employer] Link is a tool which provides read-only access to select patient information with whom you have a relationship. Its easy to use and provides real time access to review your patients record including encounter summaries, notes, results, and demographic information.    If you feel you have received this communication in error or would no longer like to receive these types of communications, please e-mail [email or web address]

## 2017-06-15 ENCOUNTER — DOCUMENTATION ONLY (OUTPATIENT)
Dept: TRANSFUSION MEDICINE | Facility: HOSPITAL | Age: 57
End: 2017-06-15

## 2017-06-15 DIAGNOSIS — D86.9 PULMONARY HYPERTENSION ASSOCIATED WITH SARCOIDOSIS: Primary | ICD-10-CM

## 2017-06-15 DIAGNOSIS — I27.29 PULMONARY HYPERTENSION ASSOCIATED WITH SARCOIDOSIS: Primary | ICD-10-CM

## 2017-06-15 NOTE — CONSULTS
[facility] TRANSFUSION MEDICINE  Section of Transfusion Medicine and Histocompatibility  HLA Note    Case Details   Diagnosis:  Pulmonary hypertension associated with sarcoidosis [I27.2, D86.9]  Blood Type: O NEG  HLA Type:   Lab Results   Component Value Date    TXZE5UV 3 04/25/2017    HMNG0QD 25 04/25/2017    MDCW9SK 7 04/25/2017    QJCZ7FP 62 04/25/2017    LQHUT8XR 9 04/25/2017    NNJGP2JN 7 04/25/2017     Lab Results   Component Value Date    KAEJGN31OI 7 04/25/2017    YCDKXU08GH 15 04/25/2017    SMEZVL279FX 53 04/25/2017    LVOGST4150 51 04/25/2017     Lab Results   Component Value Date    VMCKN0WK 2 04/25/2017    AAIBI0CS 6 04/25/2017     Lab Results   Component Value Date    PNDBV9AT 6 04/25/2017    HXNCN8GS XX 04/25/2017     Recent Antibody Screen/ID Results:   Lab Results   Component Value Date    YQ8DNMR Negative 04/25/2017    CIIAB DP1 04/25/2017    ABCMT DP1(0780), DRB5*01:01(2512) 04/25/2017     Auto T Cell Crossmatch Results:  Lab Results   Component Value Date    XMTCELLRES Negative 04/25/2017     Auto B Cell Crossmatch Results:  Lab Results   Component Value Date    BCELLRES Negative 04/25/2017     Assessment     Interpretation: The antibody to DQ4 appears allele specific (DQB1*04:01), but without the ability to specify this at listing, a generalized DQ4 is recommended as an unacceptable antigen. DR53 and DR51 are weakly expressed antibodies, but also coincide with the patient's type. There are, therefore, unlikely to be significant.    Strongly Recommended Unacceptable Antigens: DP1, DP5, DQ4     Crossmatch Expectations: A virtual/retrospective crossmatch is recommended. Weak positive crossmatch finding on virtual results at DR53 and DR51 should not dissuade organ acceptance.    Please call the HLA Lab [phone] with any concerns or questions.    Leonila Velasquez MD , PhD  TRACEY Spears MD, SHELLI  Section of Transfusion Medicine & Histocompatibility  Department of Pathology and Laboratory  Medicine [facility]  06/15/2017

## 2017-06-16 ENCOUNTER — TELEPHONE (OUTPATIENT)
Dept: TRANSPLANT | Facility: CLINIC | Age: 57
End: 2017-06-16

## 2017-06-16 NOTE — TELEPHONE ENCOUNTER
----- Message from Yahaira Dozier sent at 6/15/2017  3:09 PM CDT -----  Mr Hendrix is currently receiving $4000.00 in LTD from his employer. In September, he will begin receiving $2490.00 in SS Disability and his employer LTD will be reduced to $1510.00. He still does not know when his insurance policy will become COBRA. He would like to keep his current policy.  He is going to talk to a Rep at the [employer] to see if he will qualify for a policy there.   His daughter has contacted the [employer] in order to start fund-raising.       ----- Message -----  From: Lianet Brady RN  Sent: 6/14/2017   5:41 PM  To: Yahaira Dozier    We saw Mr. Hendrix in clinic today.  He requested that you contact him to touch base.  He still has his insurance with his employer and plans on getting COBRA once lapses.  He also stated that he was approved for disability and will start receiving payments in September.      Dr. Wolfe advised him to look at the [employer] health care website.      Can you contact him and let me know your recommendations when you have the opportunity?      Thanks,  sl

## 2017-06-16 NOTE — PROCEDURES
Martin Hendrix Jr. is a 56 y.o.  male patient, who presents for a 6 minute walk test ordered by Osmar Wolfe MD.  The diagnosis is Pre Lung Transplant Evaluation; Sarcoidosis; Pulmonary Hypertension.  The patient's BMI is 32.4 kg/m2.  Predicted distance (lower limit of normal) is 430.48 meters.      Test Results:    The test was completed without stopping.  The total time walked was 360 seconds.  During walking, the patient reported:  Dyspnea. The patient used supplemental oxygen during testing.     06/14/2017---------Distance: 365.76 meters (1200 feet)     O2 Sat % Supplemental Oxygen Heart Rate Blood Pressure Eugenio Scale   Pre-exercise  (Resting) 93 % 3 L/M 89 bpm 136/79 mmHg 0   During Exercise 74 % 3 L/M 150 bpm 133/76 mmHg 4   Post-exercise  (Recovery) 91 % 3 L/M  103 bpm       Recovery Time: 212 seconds    Performing nurse/tech: TOO Delgado      PREVIOUS STUDY:   04/26/2017---------Distance: 327.66 meters (1075 feet)       O2 Sat % Supplemental Oxygen Heart Rate Blood Pressure Eugenio Scale   Pre-exercise  (Resting) 91 % 3 L/M 113 bpm 133/80 mmHg 2   During Exercise 71 % 3 L/M 146 bpm 123/68 mmHg 5-6   Post-exercise  (Recovery) 91 % 3 L/M  103 bpm           CLINICAL INTERPRETATION:  Six minute walk distance is 365.76 meters (1200 feet) with somewhat heavy dyspnea.  During exercise, there was significant desaturation while breathing supplemental oxygen.  Blood pressure remained stable and Heart rate increased significantly with walking.  This may represent a tachycardic response to exercise.  The patient did not report non-pulmonary symptoms during exercise.  Since the previous study in April 2017, exercise capacity may be somewhat improved.  Based upon age and body mass index, exercise capacity is less than predicted.

## 2017-06-20 ENCOUNTER — CONFERENCE (OUTPATIENT)
Dept: TRANSPLANT | Facility: CLINIC | Age: 57
End: 2017-06-20
Payer: COMMERCIAL

## 2017-06-20 ENCOUNTER — TELEPHONE (OUTPATIENT)
Dept: TRANSPLANT | Facility: HOSPITAL | Age: 57
End: 2017-06-20

## 2017-06-20 NOTE — TELEPHONE ENCOUNTER
Discussed Mr. Hendrix in Selection Committee meeting today.  All members present agreed that Mr. Hendrix is a suitable candidate for a bilateral lung transplant.  Plan is to proceed with listing once financial clearance is obtained.      Contacted Mr. Hendrix.  Notified him of the outcome of the meeting.  Also instructed him to continue to lose weight.  He verbalized his understanding and agreed with the plan.

## 2017-06-20 NOTE — TELEPHONE ENCOUNTER
DEV informed by  BRENT Dozier that pt is now receiving $4000 a month in Long Term Disability.  stated that in September 2017, LTD will reduce to $1510, however will pt will begin receiving $2490.00 in SS Disability.    Pt remains uncertain of when his insurance policy will become COBRA. However per BRENT Dozier pt reports intends to keep policy. Pt also intends to follow up with [employer] insurance policies.    DEV also notified by Marilee Pat with the [employer] that pt has set up a fundraising account.     At this time pt presents with a stable ficnancial plan for transplant.

## 2017-06-28 ENCOUNTER — TELEPHONE (OUTPATIENT)
Dept: TRANSPLANT | Facility: CLINIC | Age: 57
End: 2017-06-28

## 2017-06-28 DIAGNOSIS — D86.0 SARCOIDOSIS OF LUNG: ICD-10-CM

## 2017-06-28 DIAGNOSIS — I27.29 PULMONARY HYPERTENSION ASSOCIATED WITH SARCOIDOSIS: ICD-10-CM

## 2017-06-28 DIAGNOSIS — D86.9 PULMONARY HYPERTENSION ASSOCIATED WITH SARCOIDOSIS: ICD-10-CM

## 2017-06-28 DIAGNOSIS — Z76.82 AWAITING ORGAN TRANSPLANT: Primary | ICD-10-CM

## 2017-06-28 RX ORDER — AMBRISENTAN 10 MG/1
10 TABLET, FILM COATED ORAL DAILY
Qty: 30 TABLET | Refills: 0 | Status: SHIPPED | OUTPATIENT
Start: 2017-06-28 | End: 2017-07-28

## 2017-06-28 RX ORDER — ASPIRIN 81 MG/1
81 TABLET ORAL DAILY
Status: ON HOLD | COMMUNITY
End: 2017-08-30

## 2017-06-28 NOTE — TELEPHONE ENCOUNTER
"LISTING NOTE    Received financial clearance.  Contacted Martinellis Hendrix.  Informed him that we will proceed with listing today.  Patient agreeable to being listed.  Verified and updated name, date of birth, social security number, medications, allergies, and telephone numbers.  Notified him of our policy to start Baby Aspirin 81mg on our listed patients.  Instructed him to start Aspirin 81mg daily as soon as possible. Reminded patient that he should not travel further away than his home, and if he does, someone must first call to let us (on-call MD / on-call transplant coordinator) know.  Also informed patient that he must notify someone (on-call MD) if he is hospitalized somewhere other than here or if he becomes ill, he must notify us of any medication changes or insurance changes.  Patient also instructed that he must be able to be easily reached by telephone when a donor offer becomes available.  Also informed him that he should leave his cell phone on audible and keep it charged.  Asked patient to tell his back-up caregivers to do the same.  Instructed him to call during regular office hours if he has any non-urgent/non-symptom based questions regarding any part of being listed.  Reminded him that at any point prior to the actual surgery, the transplant could be called off resulting in his return home to continue to wait for another organ offer.  Patient verbalized understanding of all points discussed.      Patient listed today per order of Dr. Wolfe.  Listing paperwork given to , Alessandro Lazcano.    Diagnosis: sarcoidosis  ABO: O NEG  LAS: 35.2288  CPRA: 0%  Class I Specificity: Negative  Class II Specificity: DP1(5280), DRB5*01:01 (2512)    Retrospective / virtual crossmatch needed at the time of the organ offer.  DP1, DP5, DQ4     Ht Readings from Last 1 Encounters:   06/14/17 5' 10" (1.778 m)     Wt Readings from Last 1 Encounters:   06/14/17 102.1 kg (225 lb)     BMI: Estimated body " "mass index is 32.28 kg/m² as calculated from the following:    Height as of 6/14/17: 5' 10" (1.778 m).    Weight as of 6/14/17: 102.1 kg (225 lb).  "

## 2017-07-01 RX ORDER — AMBRISENTAN 10 MG/1
10 TABLET, FILM COATED ORAL DAILY
Qty: 30 TABLET | Refills: 0 | Status: SHIPPED | OUTPATIENT
Start: 2017-07-01 | End: 2017-07-31

## 2017-07-03 ENCOUNTER — PATIENT MESSAGE (OUTPATIENT)
Dept: TRANSPLANT | Facility: CLINIC | Age: 57
End: 2017-07-03

## 2017-07-05 DIAGNOSIS — Z76.82 AWAITING ORGAN TRANSPLANT: Primary | ICD-10-CM

## 2017-07-05 DIAGNOSIS — D86.0 SARCOIDOSIS OF LUNG: ICD-10-CM

## 2017-07-05 DIAGNOSIS — R76.0 ELEVATED ANTIBODY LEVELS: ICD-10-CM

## 2017-07-10 ENCOUNTER — TELEPHONE (OUTPATIENT)
Dept: TRANSPLANT | Facility: CLINIC | Age: 57
End: 2017-07-10

## 2017-07-10 DIAGNOSIS — D86.0 SARCOIDOSIS OF LUNG: ICD-10-CM

## 2017-07-10 DIAGNOSIS — Z76.82 AWAITING ORGAN TRANSPLANT: Primary | ICD-10-CM

## 2017-07-10 DIAGNOSIS — R76.0 ELEVATED ANTIBODY LEVELS: ICD-10-CM

## 2017-07-10 NOTE — TELEPHONE ENCOUNTER
Contacted Mr. Hendrix.  Informed him that we need a current HLA sample on hand if needed for organ offer.  Inquired if he could go to [facility] tomorrow for the lab.  He verbalized his understanding and requested that the appointment be scheduled for 08:00 tomorrow.  Informed him that Telma would call him if we are unable to schedule the appointment tomorrow at 08:00.  He again verbalized his understanding.

## 2017-07-11 ENCOUNTER — LAB VISIT (OUTPATIENT)
Dept: LAB | Facility: HOSPITAL | Age: 57
End: 2017-07-11
Attending: INTERNAL MEDICINE
Payer: COMMERCIAL

## 2017-07-11 DIAGNOSIS — D86.0 SARCOIDOSIS OF LUNG: ICD-10-CM

## 2017-07-11 DIAGNOSIS — Z76.82 AWAITING ORGAN TRANSPLANT: ICD-10-CM

## 2017-07-11 PROCEDURE — 86833 HLA CLASS II HIGH DEFIN QUAL: CPT | Mod: TXP

## 2017-07-11 PROCEDURE — 86977 RBC SERUM PRETX INCUBJ/INHIB: CPT | Mod: 91,TXP

## 2017-07-11 PROCEDURE — 86832 HLA CLASS I HIGH DEFIN QUAL: CPT | Mod: TXP

## 2017-07-13 LAB — HPRA INTERPRETATION: NORMAL

## 2017-07-17 ENCOUNTER — DOCUMENTATION ONLY (OUTPATIENT)
Dept: TRANSFUSION MEDICINE | Facility: HOSPITAL | Age: 57
End: 2017-07-17

## 2017-07-17 DIAGNOSIS — Z76.82 LUNG TRANSPLANT CANDIDATE: Primary | ICD-10-CM

## 2017-07-17 LAB
CLASS I ANTIBODIES - LUMINEX: NEGATIVE
CLASS II ANTIBODIES - LUMINEX: NORMAL
CLASS II ANTIBODY COMMENTS - LUMINEX: NORMAL
CPRA %: 10
SERUM COLLECTION DT - LUMINEX CLASS I: NORMAL
SERUM COLLECTION DT - LUMINEX CLASS II: NORMAL
SPCL1 TESTING DATE: NORMAL
SPCL2 TESTING DATE: NORMAL

## 2017-07-17 NOTE — CONSULTS
[facility] TRANSFUSION MEDICINE  Section of Transfusion Medicine and Histocompatibility  HLA Note    Case Details   Diagnosis:  Lung transplant candidate [Z76.82]  Blood Type: O NEG  HLA Type:   Class I:  Lab Results   Component Value Date    PADP4OX 3 04/25/2017    LDLG8SS 25 04/25/2017    UPYX5BJ 7 04/25/2017    KVRG7JL 62 04/25/2017    UWKAQ3EZ 6 04/25/2017    PIFTQ6ZD XX 04/25/2017    QYIXU6BU 9 04/25/2017    YDSSW7EM 7 04/25/2017     Class II:  Lab Results   Component Value Date    VIURAN75GY 7 04/25/2017    KJCLZS06YX 15 04/25/2017    VPQHRR733NL 53 04/25/2017    MHBTXD9733 51 04/25/2017    UUJOG9KE 2 04/25/2017    GUONZ5VN 6 04/25/2017     Recent Antibody Screen/ID Results:   Lab Results   Component Value Date    GM8ICGI Negative 07/11/2017    CIIAB DP1,DQ4 07/11/2017    ABCMT DP1(8077),DQ4(2349) 07/11/2017     Auto T Cell Crossmatch Results:  Lab Results   Component Value Date    XMTCELLRES Negative 04/25/2017     Auto B Cell Crossmatch Results:  Lab Results   Component Value Date    BCELLRES Negative 04/25/2017     Assessment     Interpretation: Recommendations are unchanged from 6/15/17 assessment. The antibody to DQ4 appears allele specific (DQB1*04:01), but without the ability to specify this at listing, a generalized DQ4 is recommended as an unacceptable antigen.     Strongly Recommended Unacceptable Antigens: DP1, DP5, DQ4    Crossmatch Expectations: A virtual/retrospective crossmatch is recommended. Weak positive crossmatch finding on virtual results at DR53 and DR51 should not dissuade organ acceptance.    Please call the HLA Lab [phone] with any concerns or questions.    Leonila Velasquez MD , PhD  TRACEY Spears MD, SHELLI  Section of Transfusion Medicine & Histocompatibility  Department of Pathology and Laboratory Medicine  [facility]  07/17/2017

## 2017-08-10 ENCOUNTER — HOSPITAL ENCOUNTER (OUTPATIENT)
Dept: PULMONOLOGY | Facility: CLINIC | Age: 57
Discharge: HOME | End: 2017-08-10
Payer: COMMERCIAL

## 2017-08-10 ENCOUNTER — HOSPITAL ENCOUNTER (OUTPATIENT)
Dept: CARDIOLOGY | Facility: CLINIC | Age: 57
Discharge: HOME | End: 2017-08-10
Payer: COMMERCIAL

## 2017-08-10 ENCOUNTER — CLINICAL SUPPORT (OUTPATIENT)
Dept: INFECTIOUS DISEASES | Facility: CLINIC | Age: 57
End: 2017-08-10
Payer: COMMERCIAL

## 2017-08-10 ENCOUNTER — OFFICE VISIT (OUTPATIENT)
Dept: TRANSPLANT | Facility: CLINIC | Age: 57
End: 2017-08-10
Payer: COMMERCIAL

## 2017-08-10 VITALS
HEIGHT: 71 IN | WEIGHT: 223 LBS | BODY MASS INDEX: 31.39 KG/M2 | BODY MASS INDEX: 31.22 KG/M2 | HEIGHT: 71 IN | OXYGEN SATURATION: 97 % | DIASTOLIC BLOOD PRESSURE: 68 MMHG | HEART RATE: 74 BPM | SYSTOLIC BLOOD PRESSURE: 112 MMHG | WEIGHT: 224.19 LBS

## 2017-08-10 DIAGNOSIS — Z76.82 LUNG TRANSPLANT CANDIDATE: ICD-10-CM

## 2017-08-10 DIAGNOSIS — Z76.82 ORGAN TRANSPLANT CANDIDATE: Primary | ICD-10-CM

## 2017-08-10 DIAGNOSIS — D86.9 PULMONARY HYPERTENSION ASSOCIATED WITH SARCOIDOSIS: Primary | ICD-10-CM

## 2017-08-10 DIAGNOSIS — Z76.82 ORGAN TRANSPLANT CANDIDATE: ICD-10-CM

## 2017-08-10 DIAGNOSIS — I27.9 CHRONIC PULMONARY HEART DISEASE: ICD-10-CM

## 2017-08-10 DIAGNOSIS — Z76.82 AWAITING ORGAN TRANSPLANT STATUS: ICD-10-CM

## 2017-08-10 DIAGNOSIS — I27.29 PULMONARY HYPERTENSION ASSOCIATED WITH SARCOIDOSIS: Primary | ICD-10-CM

## 2017-08-10 DIAGNOSIS — J96.11 CHRONIC RESPIRATORY FAILURE WITH HYPOXIA: ICD-10-CM

## 2017-08-10 LAB
ESTIMATED PA SYSTOLIC PRESSURE: 51.97
MITRAL VALVE REGURGITATION: ABNORMAL
RETIRED EF AND QEF - SEE NOTES: 53 (ref 55–65)
TRICUSPID VALVE REGURGITATION: ABNORMAL

## 2017-08-10 PROCEDURE — 90740 HEPB VACC 3 DOSE IMMUNSUP IM: CPT | Mod: S$GLB,TXP,, | Performed by: INTERNAL MEDICINE

## 2017-08-10 PROCEDURE — 3008F BODY MASS INDEX DOCD: CPT | Mod: S$GLB,,, | Performed by: INTERNAL MEDICINE

## 2017-08-10 PROCEDURE — 93306 TTE W/DOPPLER COMPLETE: CPT | Mod: S$GLB,,, | Performed by: INTERNAL MEDICINE

## 2017-08-10 PROCEDURE — 99999 PR PBB SHADOW E&M-EST. PATIENT-LVL III: CPT | Mod: PBBFAC,TXP,, | Performed by: INTERNAL MEDICINE

## 2017-08-10 PROCEDURE — 94620 PR PULMONARY STRESS TESTING,SIMPLE: CPT | Mod: S$GLB,,, | Performed by: INTERNAL MEDICINE

## 2017-08-10 PROCEDURE — 90471 IMMUNIZATION ADMIN: CPT | Mod: S$GLB,TXP,, | Performed by: INTERNAL MEDICINE

## 2017-08-10 PROCEDURE — 99214 OFFICE O/P EST MOD 30 MIN: CPT | Mod: S$GLB,,, | Performed by: INTERNAL MEDICINE

## 2017-08-10 PROCEDURE — 99999 PR PBB SHADOW E&M-EST. PATIENT-LVL I: CPT | Mod: PBBFAC,TXP,,

## 2017-08-10 RX ORDER — AMBRISENTAN 10 MG/1
10 TABLET, FILM COATED ORAL DAILY
Qty: 30 TABLET | Refills: 11 | Status: ON HOLD | OUTPATIENT
Start: 2017-08-10 | End: 2017-08-30 | Stop reason: ALTCHOICE

## 2017-08-10 NOTE — PROCEDURES
Martin Hendrix Jr. is a 56 y.o.  male patient, who presents for a 6 minute walk test ordered by Shanell Higuera MD.  The diagnosis is Pulmonary Hypertension; Sarcoidosis.  The patient's BMI is 31.2 kg/m2.  Predicted distance (lower limit of normal) is 437.21 meters.      Test Results:    The test was completed without stopping.  The total time walked was 360 seconds.  During walking, the patient reported:  No complaints.  The patient used supplemental oxygen during testing.     08/10/2017---------Distance: 365.76 meters (1200 feet)     O2 Sat % Supplemental Oxygen Heart Rate Blood Pressure Eugenio Scale   Pre-exercise  (Resting) 96 % 3 L/M 65 bpm 118/71 mmHg 0   During Exercise 68 % 3 L/M 133 bpm 144/69 mmHg 3   Post-exercise  (Recovery) 94 % 3 L/M  91 bpm       Recovery Time: 169 seconds    Performing nurse/tech:  TOO Keene      PREVIOUS STUDY:   06/14/2017---------Distance: 365.76 meters (1200 feet)       O2 Sat % Supplemental Oxygen Heart Rate Blood Pressure Eugenio Scale   Pre-exercise  (Resting) 93 % 3 L/M 89 bpm 136/79 mmHg 0   During Exercise 74 % 3 L/M 150 bpm 133/76 mmHg 4   Post-exercise  (Recovery) 91 % 3 L/M  103 bpm           CLINICAL INTERPRETATION:  Six minute walk distance is 365.76 meters (1200 feet) with moderate dyspnea.  During exercise, there was significant desaturation while breathing supplemental oxygen.  Blood pressure remained stable and Heart rate increased significantly with walking.  This may represent a tachycardic response to exercise.  The patient did not report non-pulmonary symptoms during exercise.  Since the previous study in June 2017, exercise capacity is unchanged.  Based upon age and body mass index, exercise capacity is less than predicted.

## 2017-08-10 NOTE — PATIENT INSTRUCTIONS
No changes today    Check your weights every morning after getting out of bed and urinating. If your weight goes up 3# overnight or 5# in one week take an extra lasix.    Keep salt intake to under 2000 mg sodium, fluids to under 2 L (64 oz)    Flu shot

## 2017-08-10 NOTE — PROGRESS NOTES
Subjective:    Patient ID:  Martin Hendrix Jr. is a 56 y.o. male who presents for follow-up of Pulmonary Hypertension.    HPI     55 yo Wm with Sarcoid Diagnosed in 2004 by biopsy, Currently on On MTX 10 mg/week for long time, Advair bid, and Albuterol prn (prior to this tried pred, and ?thalidomide).  Referred by Rosemary Corona and Yaneth for management of PAH and now here for f/u (on adcirca/letairis)  wears his CPAP.    Since last visit pt thinks the PH tx has made a difference in how he feels. Keeps busy doing stuff around the house- doesn't notice having to stop and catch his breath, wears his O2 ATC, if he takes his o2 off does have to stop a minute.  sometimes has fluid retention in his ankles, takes his fluid pills and it comes off. No CP or LH. Lasix is 40mg daily with good UOP.      Six Minute Walk Test:   366  m unchanged from June ( 122  m in  4/17  )                                              O2 sat  96 ->68 %                  With 8L O2     sats improved to 96% rest and 90% walking                                    HR 65  -> 133                                                                 BP  118 / 71  ->144 /69                                                         Eugenio   0  -> 3    TTE today  Right Ventricle: The right ventricle is upper limit of normal measuring 4.2 cm at the base in the apical right ventricle-focused view. Global right ventricular systolic function appears normal. There is abnormal septal motion. Tricuspid annular plane   systolic excursion (TAPSE) is 2.5 cm. Tissue Doppler-derived tricuspid annular peak systolic velocity (S prime) is 10.9 cm/s. The estimated PA systolic pressure is 52 mmHg.    1 - Upper limit of normal left ventricular enlargement.     2 - Low normal to mildly depressed left ventricular systolic function (EF 50-55%).     3 - Biatrial enlargement.     4 - Indeterminate LV diastolic function.     5 - Right ventricle is upper limit of normal in size with normal  systolic function.     6 - Mild to mild- moderate (1-2+) mitral regurgitation.     7 - Mild tricuspid regurgitation.     8 - Increased central venous pressure.     9 - Pulmonary hypertension. The estimated PA systolic pressure is 52 mmHg.     RHC 5/17  AOPRES: 127/88 (101)  AOSAT: 94  FICKCI: 2.44  FICKCO: 5.4  PAPRES: 65/26 (41)  PASAT: 66  PVR: 3.89  PWPRES: 21/22 (17)  RAPRES: 14/11 (9)  RVPRES: 67/5, 14    Echo        TTE: OSH   Interpretation Summary  Ejection Fraction = 40-45%.  RSVP= 96 mm/hg  Left ventricular systolic function is mildly reduced.  A variety of Doppler measurements indicate impaired left ventricular  relaxation, which is associated with grade I/IV or mild diastolic dysfunction.       KRISTYN AHI 9 with taina sat 74%  On BPAP at home with oxygen 5 LPMs    PFTs: FEV1 30%, TLC 47%, and DLCO 44%     CT chest result from OSH   1. Resolution of right upper lobe centrilobular nodules.  2. Scattered bilateral groundglass opacities which may reflect air trapping or pneumonitis. No other detrimental change.  3. Calcified mediastinal and hilar lymph nodes presumably related to patient's history of sarcoidosis.  4. Stable areas of parenchymal and interstitial scarring.  5. Enlargement of the main pulmonary artery which can be seen with pulmonary arterial hypertension.     R/LHC 2/17/17  After NO inhalation 20 ppm:  RA:  (24)  RV: 95/20  PW:  (20)  PA: 93/42 (61)      After NO inhalation 40 ppm:  PA: 84/35 (59)  PW:  (33)  AO_SAT: 94  LVP: 130  LVPEDP: 20  AOP: 132/93    After NO inhalation 60 ppm  PA: 84/36 (58)  PA_SAT: 73               The LM is normal. There is ALENA 3 flow.     -       The LAD is normal. There is ALENA 3 flow.     -       The LCX is normal. There is ALENA 3 flow.     -       The RCA is normal. There is ALENA 3 flow.    ECG 2/17/17  Normal sinus rhythm  Right axis deviation  Incomplete right bundle branch block  Probable Right ventricular hypertrophy with repolarization  "abnormality  Nonspecific T wave abnormality    Review of Systems   Constitution: Negative for chills, fever, malaise/fatigue and weight gain.   HENT: Negative.    Eyes: Negative.    Cardiovascular: Positive for dyspnea on exertion and leg swelling. Negative for chest pain, near-syncope, orthopnea, palpitations, paroxysmal nocturnal dyspnea and syncope.   Respiratory: Negative for cough and shortness of breath.    Endocrine: Negative.    Skin: Negative.    Musculoskeletal: Negative.    Gastrointestinal: Negative for bloating, abdominal pain and change in bowel habit.   Neurological: Negative for dizziness and light-headedness.   Psychiatric/Behavioral: Negative for depression.        Objective:  /68   Pulse 74   Ht 5' 11" (1.803 m)   Wt 101.7 kg (224 lb 3.3 oz)   SpO2 97% Comment: Pt on 3L of O2 at time o reading  BMI 31.27 kg/m²       Physical Exam   Constitutional: He is oriented to person, place, and time. He appears well-developed and well-nourished.   HENT:   Head: Normocephalic and atraumatic.   Eyes: Right eye exhibits no discharge. Left eye exhibits no discharge.   Neck: Neck supple. No JVD present. No thyromegaly present.   Cardiovascular: Normal rate and regular rhythm.  Exam reveals no friction rub.    No murmur heard.  Tachy, + S3, loud S2     Pulmonary/Chest: Effort normal and breath sounds normal. No respiratory distress. He has no wheezes. He has no rales.   Abdominal: Soft. Bowel sounds are normal. He exhibits no distension. There is no tenderness.   Musculoskeletal: Normal range of motion. He exhibits edema. He exhibits no tenderness.   trace+ pitting edema at ankles   Neurological: He is alert and oriented to person, place, and time. No cranial nerve deficit. Coordination normal.   Skin: Skin is warm and dry. No rash noted.   Psychiatric: He has a normal mood and affect. Judgment and thought content normal.           Chemistry        Component Value Date/Time     08/10/2017 1122    " K 4.5 08/10/2017 1122     08/10/2017 1122    CO2 34 (H) 08/10/2017 1122    BUN 16 08/10/2017 1122    CREATININE 0.9 08/10/2017 1122     (H) 08/10/2017 1122        Component Value Date/Time    CALCIUM 9.5 08/10/2017 1122    ALKPHOS 77 08/10/2017 1122    AST 23 08/10/2017 1122    ALT 20 08/10/2017 1122    BILITOT 0.6 08/10/2017 1122            Magnesium   Date Value Ref Range Status   08/10/2017 2.0 1.6 - 2.6 mg/dL Final       Lab Results   Component Value Date    WBC 4.83 08/10/2017    HGB 13.2 (L) 08/10/2017    HCT 40.8 08/10/2017    MCV 93 08/10/2017     08/10/2017       Lab Results   Component Value Date    INR 1.0 05/22/2017    INR 0.9 04/24/2017    INR 1.0 02/14/2017       BNP   Date Value Ref Range Status   08/10/2017 12 0 - 99 pg/mL Final     Comment:     Values of less than 100 pg/ml are consistent with non-CHF populations.   04/24/2017 83 0 - 99 pg/mL Final     Comment:     Values of less than 100 pg/ml are consistent with non-CHF populations.   04/03/2017 180 (H) 0 - 99 pg/mL Final     Comment:     Values of less than 100 pg/ml are consistent with non-CHF populations.       No results found for: LDH          Assessment:       1. pulmonary hypertension- in setting of sarcoidosis- severe by RHC, with RVE and depressed RV fxn on echo- has MILD diastolic dysfunction by echo, suspect the elevated PCWP is due to RVE and FADIA impairing ability of left heart to fill, although sarcoid can certainly affect the heart and cause both systolic and diastolic dysfxn. No volume overload on exam, with normal BNP now   2. Sarcoidosis    3. Lung transplant candidate    4. Chronic respiratory failure with hypoxia    5. Chronic pulmonary heart disease      WHO Group:5  Functional Class3->2     Plan:   No changes today, cont adcirca and letairis- very pleased by the appearance of his heart and pressures on todays echo, BNP now normal, FC improved (2) 6mw distance also improved with tx though he remains quite  hypoxic    Keep salt intake to under 2000 mg sodium, fluids to under 2 L (64 oz)    Daily wts, may take an extra lasix for wt gain 3# overnight or 5# in 1 wk, should call us if fluid doesn't come off    Listed for LUT    F/u 4 mo with labs and walk

## 2017-08-10 NOTE — LETTER
August 10, 2017        Florentin Corona  [address]  [address]  [facility] [address]  Phone: [phone]  Fax: [phone]             [facility] [address] [place] [address]  Phone: [phone]   Patient: Martin Hendrix Jr.   MR Number: [id number]   YOB: 1960   Date of Visit: 8/10/2017       Dear Dr. Florentin Corona    Thank you for referring Martin Hendrix to me for evaluation. Attached you will find relevant portions of my assessment and plan of care.    If you have questions, please do not hesitate to call me. I look forward to following Martin Hendrix along with you.    Sincerely,    Shanell Higuera MD    Enclosure    If you would like to receive this communication electronically, please contact [email or web address] or [phone] to request [employer] access.    [employer] is a tool which provides read-only access to select patient information with whom you have a relationship. Its easy to use and provides real time access to review your patients record including encounter summaries, notes, results, and demographic information.    If you feel you have received this communication in error or would no longer like to receive these types of communications, please e-mail [email or web address]

## 2017-08-16 ENCOUNTER — HOSPITAL ENCOUNTER (OUTPATIENT)
Dept: PULMONOLOGY | Facility: CLINIC | Age: 57
Discharge: HOME | End: 2017-08-16
Payer: COMMERCIAL

## 2017-08-16 ENCOUNTER — OFFICE VISIT (OUTPATIENT)
Dept: TRANSPLANT | Facility: CLINIC | Age: 57
End: 2017-08-16
Payer: COMMERCIAL

## 2017-08-16 ENCOUNTER — DOCUMENTATION ONLY (OUTPATIENT)
Dept: TRANSFUSION MEDICINE | Facility: HOSPITAL | Age: 57
End: 2017-08-16

## 2017-08-16 VITALS
SYSTOLIC BLOOD PRESSURE: 124 MMHG | HEIGHT: 70 IN | HEART RATE: 60 BPM | BODY MASS INDEX: 31.64 KG/M2 | DIASTOLIC BLOOD PRESSURE: 62 MMHG | WEIGHT: 221 LBS | RESPIRATION RATE: 20 BRPM | TEMPERATURE: 97 F | OXYGEN SATURATION: 97 %

## 2017-08-16 DIAGNOSIS — D86.9 PULMONARY HYPERTENSION ASSOCIATED WITH SARCOIDOSIS: ICD-10-CM

## 2017-08-16 DIAGNOSIS — Z76.82 AWAITING ORGAN TRANSPLANT STATUS: ICD-10-CM

## 2017-08-16 DIAGNOSIS — Z76.82 AWAITING ORGAN TRANSPLANT: ICD-10-CM

## 2017-08-16 DIAGNOSIS — J96.11 CHRONIC RESPIRATORY FAILURE WITH HYPOXIA: ICD-10-CM

## 2017-08-16 DIAGNOSIS — I27.29 PULMONARY HYPERTENSION ASSOCIATED WITH SARCOIDOSIS: ICD-10-CM

## 2017-08-16 DIAGNOSIS — D86.0 SARCOIDOSIS OF LUNG: Primary | ICD-10-CM

## 2017-08-16 DIAGNOSIS — D86.0 SARCOIDOSIS OF LUNG: ICD-10-CM

## 2017-08-16 DIAGNOSIS — R76.0 ELEVATED ANTIBODY LEVELS: ICD-10-CM

## 2017-08-16 LAB
PRE FEV1 FVC: 59
PRE FEV1: 1.2
PRE FVC: 2.03
PREDICTED FEV1 FVC: 81
PREDICTED FEV1: 3.74
PREDICTED FVC: 4.6

## 2017-08-16 PROCEDURE — 3008F BODY MASS INDEX DOCD: CPT | Mod: S$GLB,,, | Performed by: INTERNAL MEDICINE

## 2017-08-16 PROCEDURE — 99213 OFFICE O/P EST LOW 20 MIN: CPT | Mod: 25,S$GLB,, | Performed by: INTERNAL MEDICINE

## 2017-08-16 PROCEDURE — 94729 DIFFUSING CAPACITY: CPT | Mod: S$GLB,TXP,, | Performed by: INTERNAL MEDICINE

## 2017-08-16 PROCEDURE — 94010 BREATHING CAPACITY TEST: CPT | Mod: S$GLB,TXP,, | Performed by: INTERNAL MEDICINE

## 2017-08-16 PROCEDURE — 99999 PR PBB SHADOW E&M-EST. PATIENT-LVL III: CPT | Mod: PBBFAC,TXP,, | Performed by: INTERNAL MEDICINE

## 2017-08-16 NOTE — PROGRESS NOTES
LUNG TRANSPLANT PRE FOLLOW-UP    Referring Physician: Florentin Corona    Reason for Visit:  Pre-lung transplant follow-up.         Date of Initial Evaluation: 4/24/2017                                                                                             History of Present Illness: Martin Hendrix Jr. is a 56 y.o. male who is on 3L of oxygen. He is on CPAP.  His New York Heart Association Class is III and a Karnofsky score of 60% - Requires occasional assistance but is able to care for needs. He is not diabetic. He presents to clinic today for his routine appointment while on the lung transplant list. His LAS is 35. Since his last clinic visit he was seen by Dr. Higuera who found him to be stable from a PH perspective on his current therapy. He continues to be on methotrexate for his Sarcoidosis. He feels that his shortness of breath is unchanged. He will still feel short of breath on moderate-heavy exertion. Denies cough or chest pains.     Review of Systems   Constitutional: Negative for chills, diaphoresis, fever, malaise/fatigue and weight loss.   HENT: Negative for congestion, ear discharge, ear pain, hearing loss, nosebleeds and sore throat.    Eyes: Negative for blurred vision, double vision and photophobia.   Respiratory: Positive for shortness of breath. Negative for cough, hemoptysis, sputum production and wheezing.    Cardiovascular: Negative for chest pain, palpitations, orthopnea, claudication, leg swelling and PND.   Gastrointestinal: Negative for abdominal pain, blood in stool, constipation, diarrhea, heartburn, melena, nausea and vomiting.   Genitourinary: Negative for dysuria, flank pain, frequency, hematuria and urgency.   Musculoskeletal: Negative for back pain, falls, joint pain, myalgias and neck pain.   Skin: Negative for itching and rash.   Neurological: Negative for dizziness, tremors, sensory change, loss of consciousness, weakness and headaches.   Endo/Heme/Allergies: Does not  "bruise/bleed easily.   Psychiatric/Behavioral: Negative for depression, hallucinations and memory loss. The patient is not nervous/anxious and does not have insomnia.      Objective:   /62 (BP Location: Right arm, Patient Position: Sitting, BP Method: Medium (Automatic))   Pulse 60   Temp 97.2 °F (36.2 °C) (Oral)   Resp 20   Ht 5' 10" (1.778 m)   Wt 100.2 kg (221 lb)   SpO2 97% Comment: 3 liters  BMI 31.71 kg/m²     Physical Exam   Constitutional: He is oriented to person, place, and time and well-developed, well-nourished, and in no distress. No distress.   HENT:   Head: Normocephalic and atraumatic.   Nose: Nose normal.   Mouth/Throat: Oropharynx is clear and moist. No oropharyngeal exudate.   Eyes: Conjunctivae and EOM are normal. Pupils are equal, round, and reactive to light. No scleral icterus.   Neck: Normal range of motion. Neck supple. No JVD present. No tracheal deviation present. No thyromegaly present.   Cardiovascular: Normal rate, regular rhythm, normal heart sounds and intact distal pulses.  Exam reveals no gallop and no friction rub.    No murmur heard.  Loud P2 positive   Pulmonary/Chest: Effort normal. No stridor. No respiratory distress. He has no wheezes. He has no rales. He exhibits no tenderness.   Abdominal: Soft. Bowel sounds are normal. He exhibits no distension and no mass. There is no tenderness. There is no rebound and no guarding.   Musculoskeletal: Normal range of motion. He exhibits no edema or tenderness.   Lymphadenopathy:     He has no cervical adenopathy.   Neurological: He is alert and oriented to person, place, and time. No cranial nerve deficit. Gait normal. Coordination normal. GCS score is 15.   Skin: Skin is warm and dry. No rash noted. He is not diaphoretic. No erythema. No pallor.   Psychiatric: Mood and affect normal.     Labs:  Hospital Outpatient Visit on 08/10/2017   Component Date Value    EF 08/10/2017 53     Mitral Valve Regurgitati* 08/10/2017 MILD TO " MODERATE     Est. PA Systolic Pressure 08/10/2017 51.97*    Tricuspid Valve Regurgit* 08/10/2017 MILD    Lab Visit on 08/10/2017   Component Date Value    Sodium 08/10/2017 143     Potassium 08/10/2017 4.5     Chloride 08/10/2017 103     CO2 08/10/2017 34*    Glucose 08/10/2017 113*    BUN, Bld 08/10/2017 16     Creatinine 08/10/2017 0.9     Calcium 08/10/2017 9.5     Total Protein 08/10/2017 7.7     Albumin 08/10/2017 4.0     Total Bilirubin 08/10/2017 0.6     Alkaline Phosphatase 08/10/2017 77     AST 08/10/2017 23     ALT 08/10/2017 20     Anion Gap 08/10/2017 6*    eGFR if African American 08/10/2017 >60.0     eGFR if non African Amer* 08/10/2017 >60.0     WBC 08/10/2017 4.83     RBC 08/10/2017 4.40*    Hemoglobin 08/10/2017 13.2*    Hematocrit 08/10/2017 40.8     MCV 08/10/2017 93     MCH 08/10/2017 30.0     MCHC 08/10/2017 32.4     RDW 08/10/2017 13.8     Platelets 08/10/2017 238     MPV 08/10/2017 8.7*    Gran # 08/10/2017 3.2     Lymph # 08/10/2017 0.6*    Mono # 08/10/2017 0.6     Eos # 08/10/2017 0.4     Baso # 08/10/2017 0.07     Gran% 08/10/2017 66.3     Lymph% 08/10/2017 11.6*    Mono% 08/10/2017 12.4     Eosinophil% 08/10/2017 8.1*    Basophil% 08/10/2017 1.4     Differential Method 08/10/2017 Automated     BNP 08/10/2017 12     Magnesium 08/10/2017 2.0     HPRA Interpretation 08/11/2017 This HPRA test has been reflexed to a [employer] PRA Specificity.     Toxoplasma IgM 08/11/2017 <3.0     Toxoplasma gondii IGG 08/16/2017 <5.0     Toxoplasma IGG Interpret* 08/16/2017 Non-reactive        Pulmonary Function Tests 8/16/2017 6/14/2017 4/26/2017 2/6/2017   FVC 2.03 2.26 1.82 1.78   FEV1 1.2 1.03 1.16 1   TLC (liters) - - - 3.01   DLCO (ml/mmHg sec) 14.3 - - 11.9   FVC% 44 49 38 41   FEV1% 32 1.43 30 1.05   FEF 25-75 0.58 0.76 0.58 0.46   FEF 25-75% 15 20 15 13   TLC% - - - 47   RV - - - 1.18   RV% - - - 54   DLCO% 51 - - 44     08/10/2017---------Distance: 365.76  meters (1200 feet)       O2 Sat % Supplemental Oxygen Heart Rate Blood Pressure Eugenio Scale   Pre-exercise  (Resting) 96 % 3 L/M 65 bpm 118/71 mmHg 0   During Exercise 68 % 3 L/M 133 bpm 144/69 mmHg 3   Post-exercise  (Recovery) 94 % 3 L/M  91 bpm          Recovery Time: 169 seconds     Performing nurse/tech:  TOO Keene      Assessment:-  1. Sarcoidosis of lung    2. Chronic respiratory failure with hypoxia    3. Pulmonary hypertension associated with sarcoidosis    4. Awaiting organ transplant status      Plan:   1. He will continue methotrexate for his Sarcoidosis and bronchodilator therapy.     2. Continue oxygen supplementation.    3. Continue tadalafil for pulmonary hypertension.    4. He will remain active on the lung transplant wait list.     5. RTC in 2 months

## 2017-08-16 NOTE — PROGRESS NOTES
[facility] TRANSFUSION MEDICINE  Section of Transfusion Medicine and Histocompatibility  HLA Note    Case Details   Diagnosis:  No primary diagnosis found.  Blood Type: O NEG  HLA Type:   Class I:  Lab Results   Component Value Date    CAFC8LO 3 04/25/2017    SZEP1US 25 04/25/2017    GPDN5IV 7 04/25/2017    JCCW3RG 62 04/25/2017    MNARG6KC 6 04/25/2017    TTFPC7ZU XX 04/25/2017    NPKOA6XJ 9 04/25/2017    UIHQR2AB 7 04/25/2017     Class II:  Lab Results   Component Value Date    UYVUIK96LH 7 04/25/2017    YWQJEN13ED 15 04/25/2017    CIITAW385SJ 53 04/25/2017    [id number] 51 04/25/2017    DTKOX5XS 2 04/25/2017    LCDSV4PH 6 04/25/2017     Recent Antibody Screen/ID Results:   Lab Results   Component Value Date    XY1CESA Negative 08/10/2017    CIIAB DP1,DR53 08/10/2017    ABCMT  08/10/2017     DP1(5585),DR53(2959)--DP5(4031),DQ4(3880),DP11(2741),DRB5*01:01(2102)     Auto T Cell Crossmatch Results:  Lab Results   Component Value Date    XMTCELLRES Negative 04/25/2017     Auto B Cell Crossmatch Results:  Lab Results   Component Value Date    BCELLRES Negative 04/25/2017     Assessment     Interpretation: The recommendations are unchanged from the 7/17/17. The antibody to DQ4 may be allele specific but without the ability to list alleles, it is prudent to list DQ4 as unacceptable pending additional testing (I will arrange this).    Strongly Recommended Unacceptable Antigens: DP1, DP5, DQ4    Crossmatch Expectations: A virtual/retrospective crossmatch is recommended. The weak DR53 and DR51 reactivity may be allele specific or artifactual, but, as these are low expression antigens, the crossmatch would not be expected to be positive if these antigens are present on the donor.    Please call the HLA Lab [phone] with any concerns or questions.    Sybil Spears MD , PhD  TRACEY Spears MD, SHELLI  Section of Transfusion Medicine & Histocompatibility  Department of Pathology and Laboratory Medicine  [facility]  08/16/2017

## 2017-08-21 ENCOUNTER — DOCUMENTATION ONLY (OUTPATIENT)
Dept: TRANSPLANT | Facility: CLINIC | Age: 57
End: 2017-08-21

## 2017-08-21 ENCOUNTER — HOSPITAL ENCOUNTER (INPATIENT)
Facility: HOSPITAL | Age: 57
LOS: 11 days | Discharge: HOME HEALTH | DRG: 007 | End: 2017-09-01
Attending: INTERNAL MEDICINE | Admitting: INTERNAL MEDICINE
Payer: COMMERCIAL

## 2017-08-21 ENCOUNTER — ANESTHESIA EVENT (OUTPATIENT)
Dept: SURGERY | Facility: HOSPITAL | Age: 57
DRG: 007 | End: 2017-08-21
Payer: COMMERCIAL

## 2017-08-21 DIAGNOSIS — D84.9 IMMUNOSUPPRESSION: ICD-10-CM

## 2017-08-21 DIAGNOSIS — I48.0 PAROXYSMAL ATRIAL FIBRILLATION: ICD-10-CM

## 2017-08-21 DIAGNOSIS — R73.9 HYPERGLYCEMIA: ICD-10-CM

## 2017-08-21 DIAGNOSIS — E87.6 HYPOKALEMIA: ICD-10-CM

## 2017-08-21 DIAGNOSIS — T38.0X5A ADRENAL CORTICAL STEROIDS CAUSING ADVERSE EFFECT IN THERAPEUTIC USE, INITIAL ENCOUNTER: ICD-10-CM

## 2017-08-21 DIAGNOSIS — Z94.2 S/P LUNG TRANSPLANT: ICD-10-CM

## 2017-08-21 DIAGNOSIS — E66.09 NON MORBID OBESITY DUE TO EXCESS CALORIES: ICD-10-CM

## 2017-08-21 DIAGNOSIS — Z94.2 LUNG TRANSPLANTED: ICD-10-CM

## 2017-08-21 DIAGNOSIS — E87.20 METABOLIC ACIDOSIS: ICD-10-CM

## 2017-08-21 DIAGNOSIS — D86.9 SARCOIDOSIS: ICD-10-CM

## 2017-08-21 DIAGNOSIS — E87.5 HYPERKALEMIA: ICD-10-CM

## 2017-08-21 DIAGNOSIS — I95.9 HYPOTENSION, UNSPECIFIED HYPOTENSION TYPE: ICD-10-CM

## 2017-08-21 DIAGNOSIS — R49.0 DYSPHONIA: ICD-10-CM

## 2017-08-21 DIAGNOSIS — D72.829 LEUKOCYTOSIS, UNSPECIFIED TYPE: ICD-10-CM

## 2017-08-21 DIAGNOSIS — E87.1 HYPONATREMIA: ICD-10-CM

## 2017-08-21 DIAGNOSIS — Z79.2 PROPHYLACTIC ANTIBIOTIC: ICD-10-CM

## 2017-08-21 DIAGNOSIS — N17.9 AKI (ACUTE KIDNEY INJURY): ICD-10-CM

## 2017-08-21 LAB
ABO + RH BLD: NORMAL
ALBUMIN SERPL BCP-MCNC: 4 G/DL
ALLENS TEST: ABNORMAL
ALP SERPL-CCNC: 76 U/L
ALT SERPL W/O P-5'-P-CCNC: 18 U/L
ANION GAP SERPL CALC-SCNC: 5 MMOL/L
APTT BLDCRRT: 25 SEC
AST SERPL-CCNC: 19 U/L
BASOPHILS # BLD AUTO: 0.07 K/UL
BASOPHILS NFR BLD: 1 %
BILIRUB SERPL-MCNC: 0.4 MG/DL
BILIRUB UR QL STRIP: NEGATIVE
BLD GP AB SCN CELLS X3 SERPL QL: NORMAL
BUN SERPL-MCNC: 14 MG/DL
CALCIUM SERPL-MCNC: 9.2 MG/DL
CHLORIDE SERPL-SCNC: 101 MMOL/L
CLARITY UR REFRACT.AUTO: CLEAR
CO2 SERPL-SCNC: 32 MMOL/L
COLOR UR AUTO: YELLOW
CREAT SERPL-MCNC: 0.9 MG/DL
DELSYS: ABNORMAL
DIFFERENTIAL METHOD: ABNORMAL
EOSINOPHIL # BLD AUTO: 0.3 K/UL
EOSINOPHIL NFR BLD: 3.9 %
ERYTHROCYTE [DISTWIDTH] IN BLOOD BY AUTOMATED COUNT: 13.4 %
EST. GFR  (AFRICAN AMERICAN): >60 ML/MIN/1.73 M^2
EST. GFR  (NON AFRICAN AMERICAN): >60 ML/MIN/1.73 M^2
FLOW: 3
GLUCOSE SERPL-MCNC: 92 MG/DL
GLUCOSE UR QL STRIP: NEGATIVE
HCO3 UR-SCNC: 32.2 MMOL/L (ref 24–28)
HCT VFR BLD AUTO: 40.7 %
HGB BLD-MCNC: 13.1 G/DL
HGB UR QL STRIP: NEGATIVE
INR PPP: 0.9
KETONES UR QL STRIP: NEGATIVE
LEUKOCYTE ESTERASE UR QL STRIP: ABNORMAL
LYMPHOCYTES # BLD AUTO: 0.9 K/UL
LYMPHOCYTES NFR BLD: 12.9 %
MCH RBC QN AUTO: 30.2 PG
MCHC RBC AUTO-ENTMCNC: 32.2 G/DL
MCV RBC AUTO: 94 FL
MICROSCOPIC COMMENT: NORMAL
MODE: ABNORMAL
MONOCYTES # BLD AUTO: 0.8 K/UL
MONOCYTES NFR BLD: 10.3 %
NEUTROPHILS # BLD AUTO: 5.2 K/UL
NEUTROPHILS NFR BLD: 71.8 %
NITRITE UR QL STRIP: NEGATIVE
PCO2 BLDA: 47.5 MMHG (ref 35–45)
PH SMN: 7.44 [PH] (ref 7.35–7.45)
PH UR STRIP: 6 [PH] (ref 5–8)
PLATELET # BLD AUTO: 259 K/UL
PMV BLD AUTO: 8.5 FL
PO2 BLDA: 63 MMHG (ref 80–100)
POC BE: 8 MMOL/L
POC SATURATED O2: 92 % (ref 95–100)
POC TCO2: 34 MMOL/L (ref 23–27)
POTASSIUM SERPL-SCNC: 4 MMOL/L
PROT SERPL-MCNC: 7.7 G/DL
PROT UR QL STRIP: NEGATIVE
PROTHROMBIN TIME: 10.2 SEC
RBC # BLD AUTO: 4.34 M/UL
RBC #/AREA URNS AUTO: 1 /HPF (ref 0–4)
SAMPLE: ABNORMAL
SITE: ABNORMAL
SODIUM SERPL-SCNC: 138 MMOL/L
SP GR UR STRIP: 1.01 (ref 1–1.03)
SP02: 93
SQUAMOUS #/AREA URNS AUTO: 0 /HPF
URN SPEC COLLECT METH UR: ABNORMAL
UROBILINOGEN UR STRIP-ACNC: NEGATIVE EU/DL
WBC # BLD AUTO: 7.27 K/UL
WBC #/AREA URNS AUTO: 1 /HPF (ref 0–5)

## 2017-08-21 PROCEDURE — 86825 HLA X-MATH NON-CYTOTOXIC: CPT | Mod: 91,TXP

## 2017-08-21 PROCEDURE — 86826 HLA X-MATCH NONCYTOTOXC ADDL: CPT | Mod: 91,TXP

## 2017-08-21 PROCEDURE — 99223 1ST HOSP IP/OBS HIGH 75: CPT | Mod: NTX,,, | Performed by: INTERNAL MEDICINE

## 2017-08-21 PROCEDURE — 86833 HLA CLASS II HIGH DEFIN QUAL: CPT | Mod: TXP

## 2017-08-21 PROCEDURE — 85730 THROMBOPLASTIN TIME PARTIAL: CPT | Mod: NTX

## 2017-08-21 PROCEDURE — 86825 HLA X-MATH NON-CYTOTOXIC: CPT | Mod: TXP

## 2017-08-21 PROCEDURE — 86920 COMPATIBILITY TEST SPIN: CPT | Mod: NTX

## 2017-08-21 PROCEDURE — 85025 COMPLETE CBC W/AUTO DIFF WBC: CPT | Mod: NTX

## 2017-08-21 PROCEDURE — 81001 URINALYSIS AUTO W/SCOPE: CPT | Mod: NTX

## 2017-08-21 PROCEDURE — 20600001 HC STEP DOWN PRIVATE ROOM: Mod: NTX

## 2017-08-21 PROCEDURE — 36600 WITHDRAWAL OF ARTERIAL BLOOD: CPT | Mod: NTX

## 2017-08-21 PROCEDURE — 86900 BLOOD TYPING SEROLOGIC ABO: CPT | Mod: NTX

## 2017-08-21 PROCEDURE — 82803 BLOOD GASES ANY COMBINATION: CPT | Mod: NTX

## 2017-08-21 PROCEDURE — 87086 URINE CULTURE/COLONY COUNT: CPT | Mod: NTX

## 2017-08-21 PROCEDURE — 80053 COMPREHEN METABOLIC PANEL: CPT | Mod: NTX

## 2017-08-21 PROCEDURE — 99900035 HC TECH TIME PER 15 MIN (STAT): Mod: NTX

## 2017-08-21 PROCEDURE — 36415 COLL VENOUS BLD VENIPUNCTURE: CPT | Mod: NTX

## 2017-08-21 PROCEDURE — 86901 BLOOD TYPING SEROLOGIC RH(D): CPT | Mod: NTX

## 2017-08-21 PROCEDURE — 86826 HLA X-MATCH NONCYTOTOXC ADDL: CPT | Mod: TXP

## 2017-08-21 PROCEDURE — 86977 RBC SERUM PRETX INCUBJ/INHIB: CPT | Mod: 91,TXP

## 2017-08-21 PROCEDURE — 86832 HLA CLASS I HIGH DEFIN QUAL: CPT | Mod: TXP

## 2017-08-21 PROCEDURE — 85610 PROTHROMBIN TIME: CPT | Mod: NTX

## 2017-08-22 ENCOUNTER — SURGERY (OUTPATIENT)
Age: 57
End: 2017-08-22

## 2017-08-22 ENCOUNTER — ANESTHESIA (OUTPATIENT)
Dept: SURGERY | Facility: HOSPITAL | Age: 57
DRG: 007 | End: 2017-08-22
Payer: COMMERCIAL

## 2017-08-22 PROBLEM — Z79.2 PROPHYLACTIC ANTIBIOTIC: Status: ACTIVE | Noted: 2017-08-22

## 2017-08-22 PROBLEM — Z94.2 LUNG TRANSPLANTED: Status: ACTIVE | Noted: 2017-08-22

## 2017-08-22 PROBLEM — E66.09 NON MORBID OBESITY DUE TO EXCESS CALORIES: Status: ACTIVE | Noted: 2017-08-22

## 2017-08-22 PROBLEM — D72.829 LEUKOCYTOSIS: Status: ACTIVE | Noted: 2017-08-22

## 2017-08-22 PROBLEM — D84.9 IMMUNOSUPPRESSION: Status: ACTIVE | Noted: 2017-08-22

## 2017-08-22 PROBLEM — R73.9 HYPERGLYCEMIA: Status: ACTIVE | Noted: 2017-08-22

## 2017-08-22 PROBLEM — Z94.2 S/P LUNG TRANSPLANT: Status: ACTIVE | Noted: 2017-08-22

## 2017-08-22 PROBLEM — T38.0X5A ADRENAL CORTICAL STEROIDS CAUSING ADVERSE EFFECT IN THERAPEUTIC USE: Status: ACTIVE | Noted: 2017-08-22

## 2017-08-22 PROBLEM — I95.9 HYPOTENSION: Status: ACTIVE | Noted: 2017-08-22

## 2017-08-22 PROBLEM — E87.20 METABOLIC ACIDOSIS: Status: ACTIVE | Noted: 2017-08-22

## 2017-08-22 PROBLEM — E87.6 HYPOKALEMIA: Status: ACTIVE | Noted: 2017-08-22

## 2017-08-22 LAB
ALBUMIN SERPL BCP-MCNC: 2 G/DL
ALBUMIN SERPL BCP-MCNC: 2.9 G/DL
ALBUMIN SERPL BCP-MCNC: 3.3 G/DL
ALBUMIN SERPL BCP-MCNC: 3.3 G/DL
ALLENS TEST: ABNORMAL
ALP SERPL-CCNC: 30 U/L
ALP SERPL-CCNC: 54 U/L
ALP SERPL-CCNC: 55 U/L
ALP SERPL-CCNC: 55 U/L
ALT SERPL W/O P-5'-P-CCNC: 15 U/L
ALT SERPL W/O P-5'-P-CCNC: 22 U/L
ALT SERPL W/O P-5'-P-CCNC: 31 U/L
ALT SERPL W/O P-5'-P-CCNC: 32 U/L
ANION GAP SERPL CALC-SCNC: 10 MMOL/L
ANION GAP SERPL CALC-SCNC: 13 MMOL/L
ANION GAP SERPL CALC-SCNC: 16 MMOL/L
ANION GAP SERPL CALC-SCNC: 9 MMOL/L
ANISOCYTOSIS BLD QL SMEAR: SLIGHT
APTT BLDCRRT: 26.3 SEC
APTT BLDCRRT: >150 SEC
AST SERPL-CCNC: 112 U/L
AST SERPL-CCNC: 127 U/L
AST SERPL-CCNC: 130 U/L
AST SERPL-CCNC: 72 U/L
B CELL RESULTS - XM ALLO: NEGATIVE
B CELL RESULTS - XM ALLO: NEGATIVE
B MCS AVERAGE - XM ALLO: 39.25
B MCS AVERAGE - XM ALLO: 46
BACTERIA UR CULT: NO GROWTH
BASOPHILS # BLD AUTO: 0.01 K/UL
BASOPHILS # BLD AUTO: ABNORMAL K/UL
BASOPHILS NFR BLD: 0 %
BILIRUB SERPL-MCNC: 1.1 MG/DL
BILIRUB SERPL-MCNC: 1.2 MG/DL
BILIRUB SERPL-MCNC: 1.5 MG/DL
BILIRUB SERPL-MCNC: 2.3 MG/DL
BLD PROD TYP BPU: NORMAL
BLOOD UNIT EXPIRATION DATE: NORMAL
BLOOD UNIT TYPE CODE: 5100
BLOOD UNIT TYPE: NORMAL
BUN SERPL-MCNC: 13 MG/DL
BUN SERPL-MCNC: 18 MG/DL
BUN SERPL-MCNC: 22 MG/DL
BUN SERPL-MCNC: 23 MG/DL
CALCIUM SERPL-MCNC: 4.6 MG/DL
CALCIUM SERPL-MCNC: 7.9 MG/DL
CALCIUM SERPL-MCNC: 8.1 MG/DL
CALCIUM SERPL-MCNC: 8.1 MG/DL
CHLORIDE SERPL-SCNC: 106 MMOL/L
CHLORIDE SERPL-SCNC: 107 MMOL/L
CHLORIDE SERPL-SCNC: 109 MMOL/L
CHLORIDE SERPL-SCNC: 123 MMOL/L
CO2 SERPL-SCNC: 11 MMOL/L
CO2 SERPL-SCNC: 18 MMOL/L
CO2 SERPL-SCNC: 19 MMOL/L
CO2 SERPL-SCNC: 21 MMOL/L
CODING SYSTEM: NORMAL
CREAT SERPL-MCNC: 0.7 MG/DL
CREAT SERPL-MCNC: 1.2 MG/DL
CREAT SERPL-MCNC: 1.4 MG/DL
CREAT SERPL-MCNC: 1.4 MG/DL
DELSYS: ABNORMAL
DELSYS: ABNORMAL
DIFFERENTIAL METHOD: ABNORMAL
DISPENSE STATUS: NORMAL
DONOR MRN: NORMAL
DONOR MRN: NORMAL
EOSINOPHIL # BLD AUTO: 0 K/UL
EOSINOPHIL # BLD AUTO: ABNORMAL K/UL
EOSINOPHIL NFR BLD: 0 %
EOSINOPHIL NFR BLD: 0.1 %
ERYTHROCYTE [DISTWIDTH] IN BLOOD BY AUTOMATED COUNT: 13.2 %
ERYTHROCYTE [DISTWIDTH] IN BLOOD BY AUTOMATED COUNT: 13.2 %
ERYTHROCYTE [DISTWIDTH] IN BLOOD BY AUTOMATED COUNT: 13.4 %
ERYTHROCYTE [DISTWIDTH] IN BLOOD BY AUTOMATED COUNT: 13.4 %
ERYTHROCYTE [SEDIMENTATION RATE] IN BLOOD BY WESTERGREN METHOD: 20 MM/H
EST. GFR  (AFRICAN AMERICAN): >60 ML/MIN/1.73 M^2
EST. GFR  (NON AFRICAN AMERICAN): 56.6 ML/MIN/1.73 M^2
EST. GFR  (NON AFRICAN AMERICAN): 56.6 ML/MIN/1.73 M^2
EST. GFR  (NON AFRICAN AMERICAN): >60 ML/MIN/1.73 M^2
EST. GFR  (NON AFRICAN AMERICAN): >60 ML/MIN/1.73 M^2
FIBRINOGEN PPP-MCNC: 214 MG/DL
FIBRINOGEN PPP-MCNC: 220 MG/DL
FIO2: 100
FIO2: 40
FIO2: 65
FIO2: 70
FIO2: 80
FIO2: 85
FXMAL TESTING DATE: NORMAL
FXMAL TESTING DATE: NORMAL
GLUCOSE SERPL-MCNC: 127 MG/DL (ref 70–110)
GLUCOSE SERPL-MCNC: 148 MG/DL (ref 70–110)
GLUCOSE SERPL-MCNC: 156 MG/DL (ref 70–110)
GLUCOSE SERPL-MCNC: 169 MG/DL
GLUCOSE SERPL-MCNC: 174 MG/DL
GLUCOSE SERPL-MCNC: 176 MG/DL (ref 70–110)
GLUCOSE SERPL-MCNC: 195 MG/DL (ref 70–110)
GLUCOSE SERPL-MCNC: 216 MG/DL
GLUCOSE SERPL-MCNC: 221 MG/DL (ref 70–110)
GLUCOSE SERPL-MCNC: 229 MG/DL (ref 70–110)
GLUCOSE SERPL-MCNC: 235 MG/DL (ref 70–110)
GLUCOSE SERPL-MCNC: 242 MG/DL (ref 70–110)
GLUCOSE SERPL-MCNC: 256 MG/DL
GLUCOSE SERPL-MCNC: 259 MG/DL (ref 70–110)
GLUCOSE SERPL-MCNC: 262 MG/DL (ref 70–110)
GLUCOSE SERPL-MCNC: 275 MG/DL (ref 70–110)
GLUCOSE SERPL-MCNC: 294 MG/DL (ref 70–110)
GLUCOSE SERPL-MCNC: 299 MG/DL (ref 70–110)
GLUCOSE SERPL-MCNC: 307 MG/DL (ref 70–110)
GRAM STN SPEC: NORMAL
GRAM STN SPEC: NORMAL
HCO3 UR-SCNC: 18.4 MMOL/L (ref 24–28)
HCO3 UR-SCNC: 18.9 MMOL/L (ref 24–28)
HCO3 UR-SCNC: 19.2 MMOL/L (ref 24–28)
HCO3 UR-SCNC: 19.6 MMOL/L (ref 24–28)
HCO3 UR-SCNC: 24.7 MMOL/L (ref 24–28)
HCO3 UR-SCNC: 27.3 MMOL/L (ref 24–28)
HCO3 UR-SCNC: 27.3 MMOL/L (ref 24–28)
HCO3 UR-SCNC: 27.6 MMOL/L (ref 24–28)
HCO3 UR-SCNC: 27.9 MMOL/L (ref 24–28)
HCO3 UR-SCNC: 28.3 MMOL/L (ref 24–28)
HCO3 UR-SCNC: 29.2 MMOL/L (ref 24–28)
HCO3 UR-SCNC: 29.3 MMOL/L (ref 24–28)
HCO3 UR-SCNC: 29.4 MMOL/L (ref 24–28)
HCO3 UR-SCNC: 30.1 MMOL/L (ref 24–28)
HCO3 UR-SCNC: 30.3 MMOL/L (ref 24–28)
HCO3 UR-SCNC: 30.7 MMOL/L (ref 24–28)
HCO3 UR-SCNC: 32.1 MMOL/L (ref 24–28)
HCO3 UR-SCNC: 32.2 MMOL/L (ref 24–28)
HCT VFR BLD AUTO: 25.7 %
HCT VFR BLD AUTO: 29 %
HCT VFR BLD AUTO: 37.5 %
HCT VFR BLD AUTO: 39.2 %
HCT VFR BLD CALC: 28 %PCV (ref 36–54)
HCT VFR BLD CALC: 29 %PCV (ref 36–54)
HCT VFR BLD CALC: 29 %PCV (ref 36–54)
HCT VFR BLD CALC: 33 %PCV (ref 36–54)
HCT VFR BLD CALC: 33 %PCV (ref 36–54)
HCT VFR BLD CALC: 34 %PCV (ref 36–54)
HCT VFR BLD CALC: 34 %PCV (ref 36–54)
HCT VFR BLD CALC: 35 %PCV (ref 36–54)
HCT VFR BLD CALC: 35 %PCV (ref 36–54)
HCT VFR BLD CALC: 36 %PCV (ref 36–54)
HCT VFR BLD CALC: 36 %PCV (ref 36–54)
HCT VFR BLD CALC: 37 %PCV (ref 36–54)
HCT VFR BLD CALC: 39 %PCV (ref 36–54)
HCT VFR BLD CALC: 39 %PCV (ref 36–54)
HGB BLD-MCNC: 12.6 G/DL
HGB BLD-MCNC: 12.8 G/DL
HGB BLD-MCNC: 8.7 G/DL
HGB BLD-MCNC: 9.4 G/DL
HLA FLOW CROSSMATCH (ALLO) INTERPRETATION: NORMAL
INR PPP: 1.1
INR PPP: 1.1
INR PPP: 1.2
INR PPP: 1.3
INR PPP: 1.4
INR PPP: 4.6
LYMPHOCYTES # BLD AUTO: 0.8 K/UL
LYMPHOCYTES # BLD AUTO: ABNORMAL K/UL
LYMPHOCYTES NFR BLD: 1 %
LYMPHOCYTES NFR BLD: 1 %
LYMPHOCYTES NFR BLD: 1.7 %
LYMPHOCYTES NFR BLD: 4.5 %
MAGNESIUM SERPL-MCNC: 1 MG/DL
MAGNESIUM SERPL-MCNC: 1.9 MG/DL
MAGNESIUM SERPL-MCNC: 1.9 MG/DL
MAGNESIUM SERPL-MCNC: 2.2 MG/DL
MCH RBC QN AUTO: 30.6 PG
MCH RBC QN AUTO: 30.7 PG
MCH RBC QN AUTO: 30.8 PG
MCH RBC QN AUTO: 30.9 PG
MCHC RBC AUTO-ENTMCNC: 32.4 G/DL
MCHC RBC AUTO-ENTMCNC: 32.7 G/DL
MCHC RBC AUTO-ENTMCNC: 33.6 G/DL
MCHC RBC AUTO-ENTMCNC: 33.9 G/DL
MCV RBC AUTO: 91 FL
MCV RBC AUTO: 92 FL
MCV RBC AUTO: 94 FL
MCV RBC AUTO: 95 FL
METAMYELOCYTES NFR BLD MANUAL: 0.5 %
METAMYELOCYTES NFR BLD MANUAL: 1 %
METAMYELOCYTES NFR BLD MANUAL: 1 %
MODE: ABNORMAL
MODE: ABNORMAL
MONOCYTES # BLD AUTO: 3.6 K/UL
MONOCYTES # BLD AUTO: ABNORMAL K/UL
MONOCYTES NFR BLD: 0 %
MONOCYTES NFR BLD: 3 %
MONOCYTES NFR BLD: 5 %
MONOCYTES NFR BLD: 8.1 %
NEUTROPHILS # BLD AUTO: 40.1 K/UL
NEUTROPHILS NFR BLD: 87 %
NEUTROPHILS NFR BLD: 90 %
NEUTROPHILS NFR BLD: 90.1 %
NEUTROPHILS NFR BLD: 94 %
NEUTS BAND NFR BLD MANUAL: 3 %
NEUTS BAND NFR BLD MANUAL: 4 %
NEUTS BAND NFR BLD MANUAL: 5 %
PCO2 BLDA: 36.6 MMHG (ref 35–45)
PCO2 BLDA: 41.9 MMHG (ref 35–45)
PCO2 BLDA: 43.4 MMHG (ref 35–45)
PCO2 BLDA: 43.9 MMHG (ref 35–45)
PCO2 BLDA: 45.4 MMHG (ref 35–45)
PCO2 BLDA: 45.7 MMHG (ref 35–45)
PCO2 BLDA: 45.9 MMHG (ref 35–45)
PCO2 BLDA: 46.1 MMHG (ref 35–45)
PCO2 BLDA: 46.1 MMHG (ref 35–45)
PCO2 BLDA: 46.4 MMHG (ref 35–45)
PCO2 BLDA: 46.4 MMHG (ref 35–45)
PCO2 BLDA: 46.5 MMHG (ref 35–45)
PCO2 BLDA: 47.2 MMHG (ref 35–45)
PCO2 BLDA: 48 MMHG (ref 35–45)
PCO2 BLDA: 48.5 MMHG (ref 35–45)
PCO2 BLDA: 50.5 MMHG (ref 35–45)
PCO2 BLDA: 58.1 MMHG (ref 35–45)
PCO2 BLDA: 63 MMHG (ref 35–45)
PEEP: 10
PH SMN: 7.2 [PH] (ref 7.35–7.45)
PH SMN: 7.21 [PH] (ref 7.35–7.45)
PH SMN: 7.22 [PH] (ref 7.35–7.45)
PH SMN: 7.23 [PH] (ref 7.35–7.45)
PH SMN: 7.24 [PH] (ref 7.35–7.45)
PH SMN: 7.32 [PH] (ref 7.35–7.45)
PH SMN: 7.37 [PH] (ref 7.35–7.45)
PH SMN: 7.38 [PH] (ref 7.35–7.45)
PH SMN: 7.39 [PH] (ref 7.35–7.45)
PH SMN: 7.39 [PH] (ref 7.35–7.45)
PH SMN: 7.4 [PH] (ref 7.35–7.45)
PH SMN: 7.4 [PH] (ref 7.35–7.45)
PH SMN: 7.41 [PH] (ref 7.35–7.45)
PH SMN: 7.42 [PH] (ref 7.35–7.45)
PH SMN: 7.43 [PH] (ref 7.35–7.45)
PH SMN: 7.45 [PH] (ref 7.35–7.45)
PH SMN: 7.48 [PH] (ref 7.35–7.45)
PH SMN: 7.53 [PH] (ref 7.35–7.45)
PLATELET # BLD AUTO: 117 K/UL
PLATELET # BLD AUTO: 137 K/UL
PLATELET # BLD AUTO: 163 K/UL
PLATELET # BLD AUTO: 168 K/UL
PLATELET BLD QL SMEAR: ABNORMAL
PLATELET BLD QL SMEAR: ABNORMAL
PMV BLD AUTO: 9.1 FL
PMV BLD AUTO: 9.2 FL
PMV BLD AUTO: 9.2 FL
PMV BLD AUTO: 9.3 FL
PO2 BLDA: 186 MMHG (ref 80–100)
PO2 BLDA: 220 MMHG (ref 80–100)
PO2 BLDA: 304 MMHG (ref 80–100)
PO2 BLDA: 315 MMHG (ref 80–100)
PO2 BLDA: 317 MMHG (ref 80–100)
PO2 BLDA: 318 MMHG (ref 80–100)
PO2 BLDA: 321 MMHG (ref 80–100)
PO2 BLDA: 322 MMHG (ref 80–100)
PO2 BLDA: 334 MMHG (ref 80–100)
PO2 BLDA: 371 MMHG (ref 80–100)
PO2 BLDA: 38 MMHG (ref 40–60)
PO2 BLDA: 380 MMHG (ref 80–100)
PO2 BLDA: 383 MMHG (ref 80–100)
PO2 BLDA: 48 MMHG (ref 40–60)
PO2 BLDA: 71 MMHG (ref 80–100)
PO2 BLDA: 80 MMHG (ref 80–100)
PO2 BLDA: 83 MMHG (ref 80–100)
PO2 BLDA: 91 MMHG (ref 80–100)
POC BE: -10 MMOL/L
POC BE: -3 MMOL/L
POC BE: -8 MMOL/L
POC BE: -9 MMOL/L
POC BE: -9 MMOL/L
POC BE: 2 MMOL/L
POC BE: 2 MMOL/L
POC BE: 3 MMOL/L
POC BE: 4 MMOL/L
POC BE: 5 MMOL/L
POC BE: 5 MMOL/L
POC BE: 6 MMOL/L
POC BE: 8 MMOL/L
POC BE: 9 MMOL/L
POC IONIZED CALCIUM: 0.87 MMOL/L (ref 1.06–1.42)
POC IONIZED CALCIUM: 0.95 MMOL/L (ref 1.06–1.42)
POC IONIZED CALCIUM: 1.02 MMOL/L (ref 1.06–1.42)
POC IONIZED CALCIUM: 1.03 MMOL/L (ref 1.06–1.42)
POC IONIZED CALCIUM: 1.04 MMOL/L (ref 1.06–1.42)
POC IONIZED CALCIUM: 1.05 MMOL/L (ref 1.06–1.42)
POC IONIZED CALCIUM: 1.05 MMOL/L (ref 1.06–1.42)
POC IONIZED CALCIUM: 1.06 MMOL/L (ref 1.06–1.42)
POC IONIZED CALCIUM: 1.08 MMOL/L (ref 1.06–1.42)
POC IONIZED CALCIUM: 1.13 MMOL/L (ref 1.06–1.42)
POC IONIZED CALCIUM: 1.15 MMOL/L (ref 1.06–1.42)
POC IONIZED CALCIUM: 1.17 MMOL/L (ref 1.06–1.42)
POC IONIZED CALCIUM: 1.31 MMOL/L (ref 1.06–1.42)
POC PCO2 TEMP: 35 MMHG
POC PH TEMP: 7.54
POC PO2 TEMP: 313 MMHG
POC SATURATED O2: 100 % (ref 95–100)
POC SATURATED O2: 75 % (ref 95–100)
POC SATURATED O2: 75 % (ref 95–100)
POC SATURATED O2: 90 % (ref 95–100)
POC SATURATED O2: 93 % (ref 95–100)
POC SATURATED O2: 94 % (ref 95–100)
POC SATURATED O2: 95 % (ref 95–100)
POC SATURATED O2: 99 % (ref 95–100)
POC TCO2: 20 MMOL/L (ref 23–27)
POC TCO2: 20 MMOL/L (ref 23–27)
POC TCO2: 21 MMOL/L (ref 23–27)
POC TCO2: 21 MMOL/L (ref 23–27)
POC TCO2: 26 MMOL/L (ref 24–29)
POC TCO2: 29 MMOL/L (ref 23–27)
POC TCO2: 30 MMOL/L (ref 23–27)
POC TCO2: 30 MMOL/L (ref 24–29)
POC TCO2: 31 MMOL/L (ref 23–27)
POC TCO2: 32 MMOL/L (ref 23–27)
POC TCO2: 33 MMOL/L (ref 23–27)
POC TCO2: 34 MMOL/L (ref 23–27)
POC TEMPERATURE: ABNORMAL
POTASSIUM BLD-SCNC: 3.2 MMOL/L (ref 3.5–5.1)
POTASSIUM BLD-SCNC: 3.3 MMOL/L (ref 3.5–5.1)
POTASSIUM BLD-SCNC: 3.3 MMOL/L (ref 3.5–5.1)
POTASSIUM BLD-SCNC: 3.4 MMOL/L (ref 3.5–5.1)
POTASSIUM BLD-SCNC: 3.4 MMOL/L (ref 3.5–5.1)
POTASSIUM BLD-SCNC: 3.5 MMOL/L (ref 3.5–5.1)
POTASSIUM BLD-SCNC: 3.5 MMOL/L (ref 3.5–5.1)
POTASSIUM BLD-SCNC: 3.6 MMOL/L (ref 3.5–5.1)
POTASSIUM BLD-SCNC: 3.8 MMOL/L (ref 3.5–5.1)
POTASSIUM BLD-SCNC: 4.2 MMOL/L (ref 3.5–5.1)
POTASSIUM BLD-SCNC: 4.6 MMOL/L (ref 3.5–5.1)
POTASSIUM BLD-SCNC: 4.6 MMOL/L (ref 3.5–5.1)
POTASSIUM BLD-SCNC: 4.7 MMOL/L (ref 3.5–5.1)
POTASSIUM BLD-SCNC: 4.8 MMOL/L (ref 3.5–5.1)
POTASSIUM SERPL-SCNC: 2.4 MMOL/L
POTASSIUM SERPL-SCNC: 3.4 MMOL/L
POTASSIUM SERPL-SCNC: 4.7 MMOL/L
POTASSIUM SERPL-SCNC: 4.7 MMOL/L
PROT SERPL-MCNC: 3.3 G/DL
PROT SERPL-MCNC: 5.7 G/DL
PROT SERPL-MCNC: 5.8 G/DL
PROT SERPL-MCNC: 5.8 G/DL
PROTHROMBIN TIME: 11.3 SEC
PROTHROMBIN TIME: 11.5 SEC
PROTHROMBIN TIME: 12.4 SEC
PROTHROMBIN TIME: 13.5 SEC
PROTHROMBIN TIME: 14.6 SEC
PROTHROMBIN TIME: 46.8 SEC
RBC # BLD AUTO: 2.82 M/UL
RBC # BLD AUTO: 3.07 M/UL
RBC # BLD AUTO: 4.09 M/UL
RBC # BLD AUTO: 4.17 M/UL
SAMPLE: ABNORMAL
SERUM COLLECTION DT - XM ALLO: NORMAL
SERUM COLLECTION DT - XM ALLO: NORMAL
SITE: ABNORMAL
SODIUM BLD-SCNC: 135 MMOL/L (ref 136–145)
SODIUM BLD-SCNC: 135 MMOL/L (ref 136–145)
SODIUM BLD-SCNC: 136 MMOL/L (ref 136–145)
SODIUM BLD-SCNC: 137 MMOL/L (ref 136–145)
SODIUM BLD-SCNC: 138 MMOL/L (ref 136–145)
SODIUM BLD-SCNC: 138 MMOL/L (ref 136–145)
SODIUM BLD-SCNC: 139 MMOL/L (ref 136–145)
SODIUM BLD-SCNC: 139 MMOL/L (ref 136–145)
SODIUM BLD-SCNC: 140 MMOL/L (ref 136–145)
SODIUM BLD-SCNC: 141 MMOL/L (ref 136–145)
SODIUM BLD-SCNC: 141 MMOL/L (ref 136–145)
SODIUM BLD-SCNC: 142 MMOL/L (ref 136–145)
SODIUM BLD-SCNC: 143 MMOL/L (ref 136–145)
SODIUM SERPL-SCNC: 137 MMOL/L
SODIUM SERPL-SCNC: 139 MMOL/L
SODIUM SERPL-SCNC: 143 MMOL/L
SODIUM SERPL-SCNC: 143 MMOL/L
SP02: 95
T CELL RESULTS - XM ALLO: POSITIVE
T CELL RESULTS - XM ALLO: POSITIVE
T MCS AVERAGE - XM ALLO: 52.5
T MCS AVERAGE - XM ALLO: 55.5
TRANS PLATPHERESIS VOL PATIENT: NORMAL ML
VT: 550
WBC # BLD AUTO: 28.12 K/UL
WBC # BLD AUTO: 31.69 K/UL
WBC # BLD AUTO: 35.46 K/UL
WBC # BLD AUTO: 44.87 K/UL

## 2017-08-22 PROCEDURE — 84295 ASSAY OF SERUM SODIUM: CPT | Mod: NTX

## 2017-08-22 PROCEDURE — P9017 PLASMA 1 DONOR FRZ W/IN 8 HR: HCPCS | Mod: NTX

## 2017-08-22 PROCEDURE — 88307 TISSUE EXAM BY PATHOLOGIST: CPT | Mod: 26,,, | Performed by: PATHOLOGY

## 2017-08-22 PROCEDURE — D9220A PRA ANESTHESIA: Mod: NTX,,, | Performed by: ANESTHESIOLOGY

## 2017-08-22 PROCEDURE — 82330 ASSAY OF CALCIUM: CPT | Mod: NTX

## 2017-08-22 PROCEDURE — 83735 ASSAY OF MAGNESIUM: CPT | Mod: 91,NTX

## 2017-08-22 PROCEDURE — 27200678 HC TRANSDUCER MONITOR KIT TRIPLE: Mod: NTX | Performed by: ANESTHESIOLOGY

## 2017-08-22 PROCEDURE — 80053 COMPREHEN METABOLIC PANEL: CPT | Mod: NTX

## 2017-08-22 PROCEDURE — 27201040 HC RC 50 FILTER: Mod: NTX

## 2017-08-22 PROCEDURE — 85610 PROTHROMBIN TIME: CPT | Mod: 91,NTX

## 2017-08-22 PROCEDURE — 25000003 PHARM REV CODE 250: Mod: NTX | Performed by: THORACIC SURGERY (CARDIOTHORACIC VASCULAR SURGERY)

## 2017-08-22 PROCEDURE — 85520 HEPARIN ASSAY: CPT | Mod: NTX

## 2017-08-22 PROCEDURE — 27201037 HC PRESSURE MONITORING SET UP: Mod: NTX

## 2017-08-22 PROCEDURE — 87205 SMEAR GRAM STAIN: CPT | Mod: NTX

## 2017-08-22 PROCEDURE — 99292 CRITICAL CARE ADDL 30 MIN: CPT | Mod: NTX,,, | Performed by: INTERNAL MEDICINE

## 2017-08-22 PROCEDURE — 27100088 HC CELL SAVER: Mod: NTX

## 2017-08-22 PROCEDURE — 85025 COMPLETE CBC W/AUTO DIFF WBC: CPT | Mod: NTX

## 2017-08-22 PROCEDURE — 81300001 HC LUNG ACQUISITION CHARGE: Mod: NTX

## 2017-08-22 PROCEDURE — 63600175 PHARM REV CODE 636 W HCPCS: Mod: NTX

## 2017-08-22 PROCEDURE — 94640 AIRWAY INHALATION TREATMENT: CPT | Mod: NTX

## 2017-08-22 PROCEDURE — 99900035 HC TECH TIME PER 15 MIN (STAT): Mod: NTX

## 2017-08-22 PROCEDURE — 63600175 PHARM REV CODE 636 W HCPCS: Mod: NTX | Performed by: INTERNAL MEDICINE

## 2017-08-22 PROCEDURE — 27100019 HC AMBU BAG ADULT/PED: Mod: NTX | Performed by: ANESTHESIOLOGY

## 2017-08-22 PROCEDURE — 0BYM0Z0 TRANSPLANTATION OF BILATERAL LUNGS, ALLOGENEIC, OPEN APPROACH: ICD-10-PCS | Performed by: THORACIC SURGERY (CARDIOTHORACIC VASCULAR SURGERY)

## 2017-08-22 PROCEDURE — P9045 ALBUMIN (HUMAN), 5%, 250 ML: HCPCS | Mod: NTX

## 2017-08-22 PROCEDURE — 36415 COLL VENOUS BLD VENIPUNCTURE: CPT | Mod: NTX

## 2017-08-22 PROCEDURE — C1751 CATH, INF, PER/CENT/MIDLINE: HCPCS | Mod: NTX | Performed by: ANESTHESIOLOGY

## 2017-08-22 PROCEDURE — 76937 US GUIDE VASCULAR ACCESS: CPT | Mod: 26,NTX,, | Performed by: ANESTHESIOLOGY

## 2017-08-22 PROCEDURE — C1729 CATH, DRAINAGE: HCPCS | Performed by: THORACIC SURGERY (CARDIOTHORACIC VASCULAR SURGERY)

## 2017-08-22 PROCEDURE — 88307 TISSUE EXAM BY PATHOLOGIST: CPT | Performed by: PATHOLOGY

## 2017-08-22 PROCEDURE — 25000003 PHARM REV CODE 250: Mod: NTX | Performed by: INTERNAL MEDICINE

## 2017-08-22 PROCEDURE — 87186 SC STD MICRODIL/AGAR DIL: CPT

## 2017-08-22 PROCEDURE — 25000003 PHARM REV CODE 250: Mod: NTX | Performed by: ANESTHESIOLOGY

## 2017-08-22 PROCEDURE — 93503 INSERT/PLACE HEART CATHETER: CPT | Mod: 59,NTX,, | Performed by: ANESTHESIOLOGY

## 2017-08-22 PROCEDURE — P9035 PLATELET PHERES LEUKOREDUCED: HCPCS | Mod: NTX

## 2017-08-22 PROCEDURE — 99291 CRITICAL CARE FIRST HOUR: CPT | Mod: NTX,,, | Performed by: INTERNAL MEDICINE

## 2017-08-22 PROCEDURE — 87070 CULTURE OTHR SPECIMN AEROBIC: CPT | Mod: NTX

## 2017-08-22 PROCEDURE — 85007 BL SMEAR W/DIFF WBC COUNT: CPT | Mod: 91,NTX

## 2017-08-22 PROCEDURE — 63600175 PHARM REV CODE 636 W HCPCS: Mod: NTX | Performed by: ANESTHESIOLOGY

## 2017-08-22 PROCEDURE — 87075 CULTR BACTERIA EXCEPT BLOOD: CPT | Mod: NTX

## 2017-08-22 PROCEDURE — 27000221 HC OXYGEN, UP TO 24 HOURS: Mod: NTX

## 2017-08-22 PROCEDURE — 63600175 PHARM REV CODE 636 W HCPCS: Mod: NTX | Performed by: NURSE PRACTITIONER

## 2017-08-22 PROCEDURE — 37000009 HC ANESTHESIA EA ADD 15 MINS: Performed by: THORACIC SURGERY (CARDIOTHORACIC VASCULAR SURGERY)

## 2017-08-22 PROCEDURE — 20000000 HC ICU ROOM: Mod: NTX

## 2017-08-22 PROCEDURE — S0028 INJECTION, FAMOTIDINE, 20 MG: HCPCS | Mod: NTX | Performed by: NURSE PRACTITIONER

## 2017-08-22 PROCEDURE — 27201423 OPTIME MED/SURG SUP & DEVICES STERILE SUPPLY: Performed by: THORACIC SURGERY (CARDIOTHORACIC VASCULAR SURGERY)

## 2017-08-22 PROCEDURE — 85014 HEMATOCRIT: CPT | Mod: NTX

## 2017-08-22 PROCEDURE — 94002 VENT MGMT INPAT INIT DAY: CPT | Mod: NTX

## 2017-08-22 PROCEDURE — 27201021 HC LEUKOCYTE FILTER: Mod: NTX

## 2017-08-22 PROCEDURE — 25000003 PHARM REV CODE 250: Mod: NTX | Performed by: NURSE PRACTITIONER

## 2017-08-22 PROCEDURE — C1894 INTRO/SHEATH, NON-LASER: HCPCS | Mod: NTX | Performed by: ANESTHESIOLOGY

## 2017-08-22 PROCEDURE — 80053 COMPREHEN METABOLIC PANEL: CPT | Mod: 91,NTX

## 2017-08-22 PROCEDURE — 87116 MYCOBACTERIA CULTURE: CPT | Mod: NTX

## 2017-08-22 PROCEDURE — 25000242 PHARM REV CODE 250 ALT 637 W/ HCPCS: Mod: NTX | Performed by: NURSE PRACTITIONER

## 2017-08-22 PROCEDURE — 82803 BLOOD GASES ANY COMBINATION: CPT | Mod: NTX

## 2017-08-22 PROCEDURE — 37799 UNLISTED PX VASCULAR SURGERY: CPT | Mod: NTX

## 2017-08-22 PROCEDURE — 85384 FIBRINOGEN ACTIVITY: CPT | Mod: 91,NTX

## 2017-08-22 PROCEDURE — 27100025 HC TUBING, SET FLUID WARMER: Mod: NTX | Performed by: ANESTHESIOLOGY

## 2017-08-22 PROCEDURE — 71000033 HC RECOVERY, INTIAL HOUR: Performed by: THORACIC SURGERY (CARDIOTHORACIC VASCULAR SURGERY)

## 2017-08-22 PROCEDURE — 94761 N-INVAS EAR/PLS OXIMETRY MLT: CPT | Mod: NTX

## 2017-08-22 PROCEDURE — 83735 ASSAY OF MAGNESIUM: CPT | Mod: NTX

## 2017-08-22 PROCEDURE — 93312 ECHO TRANSESOPHAGEAL: CPT | Mod: 26,59,, | Performed by: ANESTHESIOLOGY

## 2017-08-22 PROCEDURE — 32854 LUNG TRANSPLANT WITH BYPASS: CPT | Mod: ,,, | Performed by: THORACIC SURGERY (CARDIOTHORACIC VASCULAR SURGERY)

## 2017-08-22 PROCEDURE — 84132 ASSAY OF SERUM POTASSIUM: CPT | Mod: NTX

## 2017-08-22 PROCEDURE — 36620 INSERTION CATHETER ARTERY: CPT | Mod: 59,NTX,, | Performed by: ANESTHESIOLOGY

## 2017-08-22 PROCEDURE — 85027 COMPLETE CBC AUTOMATED: CPT | Mod: 91,NTX

## 2017-08-22 PROCEDURE — 85730 THROMBOPLASTIN TIME PARTIAL: CPT | Mod: 91,NTX

## 2017-08-22 PROCEDURE — 87077 CULTURE AEROBIC IDENTIFY: CPT

## 2017-08-22 PROCEDURE — 87102 FUNGUS ISOLATION CULTURE: CPT | Mod: NTX

## 2017-08-22 PROCEDURE — 36592 COLLECT BLOOD FROM PICC: CPT | Mod: NTX

## 2017-08-22 PROCEDURE — 5A1221Z PERFORMANCE OF CARDIAC OUTPUT, CONTINUOUS: ICD-10-PCS | Performed by: THORACIC SURGERY (CARDIOTHORACIC VASCULAR SURGERY)

## 2017-08-22 PROCEDURE — 99223 1ST HOSP IP/OBS HIGH 75: CPT | Mod: NTX,,, | Performed by: NURSE PRACTITIONER

## 2017-08-22 PROCEDURE — 37000008 HC ANESTHESIA 1ST 15 MINUTES: Performed by: THORACIC SURGERY (CARDIOTHORACIC VASCULAR SURGERY)

## 2017-08-22 PROCEDURE — 27000175 HC ADULT BYPASS PUMP: Mod: NTX

## 2017-08-22 PROCEDURE — 27100171 HC OXYGEN HIGH FLOW UP TO 24 HOURS: Mod: NTX

## 2017-08-22 PROCEDURE — 27100021 HC MULTIPORT INFUSION MANIFOLD: Mod: NTX | Performed by: ANESTHESIOLOGY

## 2017-08-22 PROCEDURE — 36000931 HC OR TIME LEV VII EA ADD 15 MIN: Performed by: THORACIC SURGERY (CARDIOTHORACIC VASCULAR SURGERY)

## 2017-08-22 PROCEDURE — 36000930 HC OR TIME LEV VII 1ST 15 MIN: Performed by: THORACIC SURGERY (CARDIOTHORACIC VASCULAR SURGERY)

## 2017-08-22 PROCEDURE — 27000191 HC C-V MONITORING: Mod: NTX

## 2017-08-22 RX ORDER — HEPARIN SODIUM 1000 [USP'U]/ML
INJECTION, SOLUTION INTRAVENOUS; SUBCUTANEOUS
Status: DISCONTINUED | OUTPATIENT
Start: 2017-08-22 | End: 2017-08-22

## 2017-08-22 RX ORDER — METHYLPREDNISOLONE SOD SUCC 125 MG
VIAL (EA) INJECTION
Status: DISCONTINUED | OUTPATIENT
Start: 2017-08-22 | End: 2017-08-22

## 2017-08-22 RX ORDER — BACITRACIN 50000 [IU]/1
INJECTION, POWDER, FOR SOLUTION INTRAMUSCULAR
Status: DISCONTINUED | OUTPATIENT
Start: 2017-08-22 | End: 2017-08-22 | Stop reason: HOSPADM

## 2017-08-22 RX ORDER — LIDOCAINE HCL/PF 100 MG/5ML
SYRINGE (ML) INTRAVENOUS
Status: DISCONTINUED | OUTPATIENT
Start: 2017-08-22 | End: 2017-08-22

## 2017-08-22 RX ORDER — PROPOFOL 10 MG/ML
5 INJECTION, EMULSION INTRAVENOUS CONTINUOUS
Status: DISCONTINUED | OUTPATIENT
Start: 2017-08-22 | End: 2017-08-23

## 2017-08-22 RX ORDER — AMINOCAPROIC ACID 250 MG/ML
INJECTION, SOLUTION INTRAVENOUS
Status: DISCONTINUED | OUTPATIENT
Start: 2017-08-22 | End: 2017-08-22

## 2017-08-22 RX ORDER — MAGNESIUM SULFATE HEPTAHYDRATE 40 MG/ML
2 INJECTION, SOLUTION INTRAVENOUS
Status: DISCONTINUED | OUTPATIENT
Start: 2017-08-22 | End: 2017-09-01 | Stop reason: HOSPADM

## 2017-08-22 RX ORDER — ONDANSETRON 2 MG/ML
INJECTION INTRAMUSCULAR; INTRAVENOUS
Status: DISCONTINUED | OUTPATIENT
Start: 2017-08-22 | End: 2017-08-22

## 2017-08-22 RX ORDER — NOREPINEPHRINE BITARTRATE/D5W 4MG/250ML
0.02 PLASTIC BAG, INJECTION (ML) INTRAVENOUS CONTINUOUS
Status: DISCONTINUED | OUTPATIENT
Start: 2017-08-22 | End: 2017-08-24

## 2017-08-22 RX ORDER — POTASSIUM CHLORIDE 20 MEQ/15ML
40 SOLUTION ORAL
Status: DISCONTINUED | OUTPATIENT
Start: 2017-08-22 | End: 2017-09-01 | Stop reason: HOSPADM

## 2017-08-22 RX ORDER — FAMOTIDINE 10 MG/ML
20 INJECTION INTRAVENOUS 2 TIMES DAILY
Status: DISCONTINUED | OUTPATIENT
Start: 2017-08-22 | End: 2017-08-24

## 2017-08-22 RX ORDER — CHLORHEXIDINE GLUCONATE ORAL RINSE 1.2 MG/ML
15 SOLUTION DENTAL 2 TIMES DAILY
Status: DISCONTINUED | OUTPATIENT
Start: 2017-08-22 | End: 2017-09-01 | Stop reason: HOSPADM

## 2017-08-22 RX ORDER — SULFAMETHOXAZOLE AND TRIMETHOPRIM 200; 40 MG/5ML; MG/5ML
20 SUSPENSION ORAL
Status: DISCONTINUED | OUTPATIENT
Start: 2017-08-23 | End: 2017-08-24

## 2017-08-22 RX ORDER — ALBUMIN HUMAN 50 G/1000ML
12.5 SOLUTION INTRAVENOUS ONCE
Status: COMPLETED | OUTPATIENT
Start: 2017-08-22 | End: 2017-08-22

## 2017-08-22 RX ORDER — PROPOFOL 10 MG/ML
VIAL (ML) INTRAVENOUS CONTINUOUS PRN
Status: DISCONTINUED | OUTPATIENT
Start: 2017-08-22 | End: 2017-08-22

## 2017-08-22 RX ORDER — MYCOPHENOLATE MOFETIL 200 MG/ML
500 POWDER, FOR SUSPENSION ORAL EVERY 12 HOURS
Status: DISCONTINUED | OUTPATIENT
Start: 2017-08-22 | End: 2017-08-24

## 2017-08-22 RX ORDER — MIDAZOLAM HYDROCHLORIDE 1 MG/ML
INJECTION INTRAMUSCULAR; INTRAVENOUS
Status: DISCONTINUED | OUTPATIENT
Start: 2017-08-22 | End: 2017-08-22

## 2017-08-22 RX ORDER — PROTAMINE SULFATE 10 MG/ML
INJECTION, SOLUTION INTRAVENOUS
Status: DISCONTINUED | OUTPATIENT
Start: 2017-08-22 | End: 2017-08-22

## 2017-08-22 RX ORDER — FENTANYL CITRAT/DEXTROSE 5%/PF 100 MCG/10
25 PATIENT CONTROLLED ANALGESIA SYRINGE INTRAVENOUS CONTINUOUS
Status: DISCONTINUED | OUTPATIENT
Start: 2017-08-22 | End: 2017-08-23

## 2017-08-22 RX ORDER — ONDANSETRON 2 MG/ML
4 INJECTION INTRAMUSCULAR; INTRAVENOUS EVERY 6 HOURS PRN
Status: DISCONTINUED | OUTPATIENT
Start: 2017-08-22 | End: 2017-09-01 | Stop reason: HOSPADM

## 2017-08-22 RX ORDER — NITROGLYCERIN 5 MG/ML
INJECTION, SOLUTION INTRAVENOUS
Status: DISCONTINUED | OUTPATIENT
Start: 2017-08-22 | End: 2017-08-22

## 2017-08-22 RX ORDER — LEVALBUTEROL 1.25 MG/.5ML
1.25 SOLUTION, CONCENTRATE RESPIRATORY (INHALATION) EVERY 8 HOURS
Status: DISCONTINUED | OUTPATIENT
Start: 2017-08-22 | End: 2017-09-01 | Stop reason: HOSPADM

## 2017-08-22 RX ORDER — ALBUMIN HUMAN 50 G/1000ML
SOLUTION INTRAVENOUS
Status: COMPLETED
Start: 2017-08-22 | End: 2017-08-22

## 2017-08-22 RX ORDER — ROCURONIUM BROMIDE 10 MG/ML
INJECTION, SOLUTION INTRAVENOUS
Status: DISCONTINUED | OUTPATIENT
Start: 2017-08-22 | End: 2017-08-22

## 2017-08-22 RX ORDER — CIPROFLOXACIN 2 MG/ML
400 INJECTION, SOLUTION INTRAVENOUS
Status: DISCONTINUED | OUTPATIENT
Start: 2017-08-22 | End: 2017-09-01 | Stop reason: HOSPADM

## 2017-08-22 RX ORDER — ACETAMINOPHEN 500 MG
500 TABLET ORAL EVERY 6 HOURS PRN
Status: DISCONTINUED | OUTPATIENT
Start: 2017-08-22 | End: 2017-09-01 | Stop reason: HOSPADM

## 2017-08-22 RX ORDER — FLUCONAZOLE 40 MG/ML
400 POWDER, FOR SUSPENSION ORAL DAILY
Status: DISCONTINUED | OUTPATIENT
Start: 2017-08-22 | End: 2017-08-24

## 2017-08-22 RX ORDER — POTASSIUM CHLORIDE 20 MEQ/15ML
60 SOLUTION ORAL
Status: DISCONTINUED | OUTPATIENT
Start: 2017-08-22 | End: 2017-09-01 | Stop reason: HOSPADM

## 2017-08-22 RX ORDER — SODIUM BICARBONATE IN D5W 150/1000ML
PLASTIC BAG, INJECTION (ML) INTRAVENOUS CONTINUOUS
Status: DISCONTINUED | OUTPATIENT
Start: 2017-08-22 | End: 2017-08-23

## 2017-08-22 RX ORDER — FENTANYL CITRATE 50 UG/ML
INJECTION, SOLUTION INTRAMUSCULAR; INTRAVENOUS
Status: DISCONTINUED | OUTPATIENT
Start: 2017-08-22 | End: 2017-08-22

## 2017-08-22 RX ORDER — FUROSEMIDE 10 MG/ML
40 INJECTION INTRAMUSCULAR; INTRAVENOUS ONCE
Status: COMPLETED | OUTPATIENT
Start: 2017-08-22 | End: 2017-08-22

## 2017-08-22 RX ADMIN — ROCURONIUM BROMIDE 50 MG: 10 INJECTION, SOLUTION INTRAVENOUS at 05:08

## 2017-08-22 RX ADMIN — ALBUMIN HUMAN 12.5 G: 50 SOLUTION INTRAVENOUS at 04:08

## 2017-08-22 RX ADMIN — NITROGLYCERIN 25 MCG: 5 INJECTION, SOLUTION INTRAVENOUS at 05:08

## 2017-08-22 RX ADMIN — PROPOFOL 25 MCG/KG/MIN: 10 INJECTION, EMULSION INTRAVENOUS at 11:08

## 2017-08-22 RX ADMIN — AMINOCAPROIC ACID 10 G: 250 INJECTION, SOLUTION INTRAVENOUS at 05:08

## 2017-08-22 RX ADMIN — ROCURONIUM BROMIDE 50 MG: 10 INJECTION, SOLUTION INTRAVENOUS at 11:08

## 2017-08-22 RX ADMIN — MIDAZOLAM HYDROCHLORIDE 2 MG: 1 INJECTION, SOLUTION INTRAMUSCULAR; INTRAVENOUS at 03:08

## 2017-08-22 RX ADMIN — LIDOCAINE HYDROCHLORIDE 100 MG: 20 INJECTION, SOLUTION INTRAVENOUS at 04:08

## 2017-08-22 RX ADMIN — CHLORHEXIDINE GLUCONATE 15 ML: 1.2 RINSE ORAL at 08:08

## 2017-08-22 RX ADMIN — ROCURONIUM BROMIDE 100 MG: 10 INJECTION, SOLUTION INTRAVENOUS at 04:08

## 2017-08-22 RX ADMIN — FENTANYL CITRATE 250 MCG: 50 INJECTION, SOLUTION INTRAMUSCULAR; INTRAVENOUS at 05:08

## 2017-08-22 RX ADMIN — METHYLPREDNISOLONE SODIUM SUCCINATE 500 MG: 125 INJECTION, POWDER, FOR SOLUTION INTRAMUSCULAR; INTRAVENOUS at 04:08

## 2017-08-22 RX ADMIN — PROTAMINE SULFATE 270 MG: 10 INJECTION, SOLUTION INTRAVENOUS at 11:08

## 2017-08-22 RX ADMIN — EPINEPHRINE 0.05 MCG/KG/MIN: 1 INJECTION PARENTERAL at 10:08

## 2017-08-22 RX ADMIN — NOREPINEPHRINE BITARTRATE 0.02 MCG/KG/MIN: 1 INJECTION, SOLUTION, CONCENTRATE INTRAVENOUS at 07:08

## 2017-08-22 RX ADMIN — METHYLPREDNISOLONE SODIUM SUCCINATE 500 MG: 125 INJECTION, POWDER, FOR SOLUTION INTRAMUSCULAR; INTRAVENOUS at 06:08

## 2017-08-22 RX ADMIN — FLUCONAZOLE 400 MG: 40 POWDER, FOR SUSPENSION ORAL at 05:08

## 2017-08-22 RX ADMIN — FUROSEMIDE 40 MG: 10 INJECTION, SOLUTION INTRAVENOUS at 09:08

## 2017-08-22 RX ADMIN — FENTANYL CITRATE 400 MCG: 50 INJECTION, SOLUTION INTRAMUSCULAR; INTRAVENOUS at 04:08

## 2017-08-22 RX ADMIN — PROPOFOL 40 MCG/KG/MIN: 10 INJECTION, EMULSION INTRAVENOUS at 05:08

## 2017-08-22 RX ADMIN — SODIUM CHLORIDE 22 UNITS/HR: 9 INJECTION, SOLUTION INTRAVENOUS at 05:08

## 2017-08-22 RX ADMIN — HEPARIN SODIUM 8000 UNITS: 1000 INJECTION, SOLUTION INTRAVENOUS; SUBCUTANEOUS at 05:08

## 2017-08-22 RX ADMIN — LEVALBUTEROL 1.25 MG: 1.25 SOLUTION, CONCENTRATE RESPIRATORY (INHALATION) at 11:08

## 2017-08-22 RX ADMIN — EPINEPHRINE 0.02 MCG/KG/MIN: 1 INJECTION INTRAMUSCULAR; INTRAVENOUS; SUBCUTANEOUS at 11:08

## 2017-08-22 RX ADMIN — POTASSIUM CHLORIDE 40 MEQ: 20 SOLUTION ORAL at 04:08

## 2017-08-22 RX ADMIN — SODIUM CHLORIDE, SODIUM GLUCONATE, SODIUM ACETATE, POTASSIUM CHLORIDE, MAGNESIUM CHLORIDE, SODIUM PHOSPHATE, DIBASIC, AND POTASSIUM PHOSPHATE: .53; .5; .37; .037; .03; .012; .00082 INJECTION, SOLUTION INTRAVENOUS at 04:08

## 2017-08-22 RX ADMIN — NITROGLYCERIN 50 MCG: 5 INJECTION, SOLUTION INTRAVENOUS at 05:08

## 2017-08-22 RX ADMIN — HEPARIN SODIUM 34000 UNITS: 1000 INJECTION, SOLUTION INTRAVENOUS; SUBCUTANEOUS at 05:08

## 2017-08-22 RX ADMIN — FENTANYL CITRATE 100 MCG: 50 INJECTION, SOLUTION INTRAMUSCULAR; INTRAVENOUS at 03:08

## 2017-08-22 RX ADMIN — PROPOFOL 30 MCG/KG/MIN: 10 INJECTION, EMULSION INTRAVENOUS at 01:08

## 2017-08-22 RX ADMIN — Medication 500 MG: at 08:08

## 2017-08-22 RX ADMIN — AMINOCAPROIC ACID 1 G/HR: 250 INJECTION, SOLUTION INTRAVENOUS at 04:08

## 2017-08-22 RX ADMIN — SODIUM CHLORIDE, SODIUM GLUCONATE, SODIUM ACETATE, POTASSIUM CHLORIDE, MAGNESIUM CHLORIDE, SODIUM PHOSPHATE, DIBASIC, AND POTASSIUM PHOSPHATE: .53; .5; .37; .037; .03; .012; .00082 INJECTION, SOLUTION INTRAVENOUS at 03:08

## 2017-08-22 RX ADMIN — VANCOMYCIN HYDROCHLORIDE 1500 MG: 100 INJECTION, POWDER, LYOPHILIZED, FOR SOLUTION INTRAVENOUS at 05:08

## 2017-08-22 RX ADMIN — SODIUM CHLORIDE 6 UNITS/HR: 9 INJECTION, SOLUTION INTRAVENOUS at 08:08

## 2017-08-22 RX ADMIN — GANCICLOVIR SODIUM 493 MG: 500 INJECTION, POWDER, LYOPHILIZED, FOR SOLUTION INTRAVENOUS at 04:08

## 2017-08-22 RX ADMIN — VANCOMYCIN HYDROCHLORIDE 1000 MG: 1 INJECTION, POWDER, LYOPHILIZED, FOR SOLUTION INTRAVENOUS at 02:08

## 2017-08-22 RX ADMIN — SODIUM CHLORIDE 20 MG: 9 INJECTION, SOLUTION INTRAVENOUS at 08:08

## 2017-08-22 RX ADMIN — VANCOMYCIN HYDROCHLORIDE 1000 G: 1 INJECTION, POWDER, LYOPHILIZED, FOR SOLUTION INTRAVENOUS at 04:08

## 2017-08-22 RX ADMIN — LEVALBUTEROL 1.25 MG: 1.25 SOLUTION, CONCENTRATE RESPIRATORY (INHALATION) at 02:08

## 2017-08-22 RX ADMIN — Medication 0.1 MCG/KG/MIN: at 11:08

## 2017-08-22 RX ADMIN — SODIUM CHLORIDE 25 UNITS/HR: 9 INJECTION, SOLUTION INTRAVENOUS at 11:08

## 2017-08-22 RX ADMIN — FENTANYL CITRATE 250 MCG: 50 INJECTION, SOLUTION INTRAMUSCULAR; INTRAVENOUS at 11:08

## 2017-08-22 RX ADMIN — Medication 0.5 MG: at 05:08

## 2017-08-22 RX ADMIN — MIDAZOLAM HYDROCHLORIDE 6 MG: 1 INJECTION, SOLUTION INTRAMUSCULAR; INTRAVENOUS at 04:08

## 2017-08-22 RX ADMIN — ALBUMIN (HUMAN) 12.5 G: 12.5 SOLUTION INTRAVENOUS at 04:08

## 2017-08-22 RX ADMIN — CALCIUM CHLORIDE 0.5 G: 100 INJECTION, SOLUTION INTRAVENOUS at 11:08

## 2017-08-22 RX ADMIN — FAMOTIDINE 20 MG: 10 INJECTION, SOLUTION INTRAVENOUS at 08:08

## 2017-08-22 RX ADMIN — CIPROFLOXACIN 400 MG: 2 INJECTION, SOLUTION INTRAVENOUS at 03:08

## 2017-08-22 RX ADMIN — BACITRACIN 50000 UNITS: 50000 INJECTION, POWDER, LYOPHILIZED, FOR SOLUTION INTRAMUSCULAR at 11:08

## 2017-08-22 RX ADMIN — Medication 0.08 MCG/KG/MIN: at 04:08

## 2017-08-22 RX ADMIN — SODIUM CHLORIDE, SODIUM GLUCONATE, SODIUM ACETATE, POTASSIUM CHLORIDE, MAGNESIUM CHLORIDE, SODIUM PHOSPHATE, DIBASIC, AND POTASSIUM PHOSPHATE: .53; .5; .37; .037; .03; .012; .00082 INJECTION, SOLUTION INTRAVENOUS at 11:08

## 2017-08-22 RX ADMIN — ROCURONIUM BROMIDE 50 MG: 10 INJECTION, SOLUTION INTRAVENOUS at 06:08

## 2017-08-22 NOTE — ANESTHESIA PROCEDURE NOTES
Lisco Brian Line    Diagnosis: Sarcoidosis  Patient location during procedure: done in OR  Procedure start time: 8/22/2017 4:15 AM  Timeout: 8/22/2017 4:15 AM  Procedure end time: 8/22/2017 4:45 AM  Staffing  Anesthesiologist: MUNA MILTON  Resident/CRNA: DEMOND EGAN  Performed: resident/CRNA   Anesthesiologist was present at the time of the procedure.  Preanesthetic Checklist  Completed: patient identified, site marked, surgical consent, pre-op evaluation, timeout performed, IV checked, risks and benefits discussed, monitors and equipment checked and anesthesia consent given  Lisco Brian Line  Skin Prep: chlorhexidine gluconate  Local Infiltration: none  Location: right,  internal jugular vein  Vessel Caliber: large, patent, compressibility normal  Vascular Doppler:  not done  Introducer: 14 Fr double lumen, manometry used.  Device: CCO/Oximetric Catheter  Catheter Size: 8 Fr  Catheter placement by yes. Heme positive aspiration all ports. PAC floated with balloon up until wedgedSterile sheath used  Locked at: 50 cm.Insertion Attempts: 1  Indication: hemodynamic monitoring, intravenous therapy  Ultrasound Guidance  Needle advanced into vessel with real time Ultrasound guidance.  Image recorded and saved.  Guidewire confirmed in vessel.  Sterile sheath used.  Assessment  Central Line Bundle Protocol followed. Hand hygiene before procedure, surgical cap worn, surgical mask worn, sterile surgical gloves worn, large sterile drape used.  Verification: ultrasound, blood return and pulsatile blood flow  Dressing: secured with tape and tegaderm and sutured in place and taped  Patient: Tolerated Well

## 2017-08-22 NOTE — ANESTHESIA PROCEDURE NOTES
Arterial    Diagnosis: Sarcoidosis    Patient location during procedure: done in OR  Procedure start time: 8/22/2017 3:50 AM  Timeout: 8/22/2017 3:50 AM  Procedure end time: 8/22/2017 4:00 AM  Staffing  Anesthesiologist: MUNA MILTON  Resident/CRNA: DEMOND EGAN  Performed: resident/CRNA   Anesthesiologist was present at the time of the procedure.  Preanesthetic Checklist  Completed: patient identified, site marked, surgical consent, pre-op evaluation, timeout performed, IV checked, risks and benefits discussed, monitors and equipment checked and anesthesia consent givenArterial  Skin Prep: chlorhexidine gluconate  Local Infiltration: lidocaine  Orientation: left  Location: radial  Catheter Size: 20 G  Catheter placement by Anatomical landmarks. Heme positive aspiration all ports.Insertion Attempts: 1  Assessment  Dressing: secured with tape and tegaderm  Patient: Tolerated well

## 2017-08-22 NOTE — PROGRESS NOTES
SW unable to complete admit note at this time due to pt being in surgery. SW to complete admit note at a later time/date.

## 2017-08-22 NOTE — HPI
Reason for Consult: Management of  Hyperglycemia     Surgical Procedure and Date:  8/22/17 s/p bilateral lung transplant       HPI:  Mr. 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 He is now s/p bilateral lung transplant with steroid induced hyperglycemia. Endocrine consulted for bg management.

## 2017-08-22 NOTE — ASSESSMENT & PLAN NOTE
Bactrim, valganciclovir, and diflucan for opportunistic infection prophylaxis. Ciprofloxacin and vancomycin for surgical prophylaxis.

## 2017-08-22 NOTE — ASSESSMENT & PLAN NOTE
There is no evidence of graft dysfunction. Will keep ventilated overnight with lung protective ventilation keeping tidal volumes <500. Will keep PEEP at 10 for recruitment. Continue CPT and bronchodilators. Continue to monitor chest tube output which is adequate. Will plan for bronchoscopy and possible extubation tomorrow.

## 2017-08-22 NOTE — PROGRESS NOTES
[facility]  Lung Transplant  Progress Note - Critical Care    Patient Name: Martin Hendrix Jr.  MRN: [id number]  Admission Date: 8/21/2017  Hospital Length of Stay: 1 days  Post-Operative Day:   Attending Physician: Darrick Wolfe MD  Primary Care Provider: Sukhi Dunham Jr, MD     Subjective:     Interval History: S/P Bilateral Lung Transplant POD#0    Mr. Hendrix received his bilateral lung transplant earlier today. Surgery was complicated with a difficult dissection of calcified lymph nodes and also difficulty with bronchial anastomosis because of calcification in the bronchial tissue. He received 4 units of FFP intraoperatively. He arrived to the ICU in stable condition. He is on norepinephrine and epinephrine, both less than 0.1 mcg/min/kg.    CMV status is D+/R-. Retrospective crossmatch is not available yet.    Continuous Infusions:   epinephrine infusion 0.06 mcg/kg/min (08/22/17 1600)    fentanyl 100 mcg/hr (08/22/17 1600)    insulin (HUMAN R) infusion (adults) 16 Units/hr (08/22/17 1600)    norepinephrine bitartrate-D5W 0.08 mcg/kg/min (08/22/17 1600)    propofol 50 mcg/kg/min (08/22/17 1600)    sodium bicarbonate in D5W       Scheduled Meds:   basiliximab (SIMULECT) chemo infusion  20 mg Intravenous Once Pre-Op    chlorhexidine  15 mL Mouth/Throat BID    ciprofloxacin  400 mg Intravenous Q12H    famotidine (PF)  20 mg Intravenous BID    fluconazole 40 mg/ml  400 mg Per NG tube Daily    ganciclovir (CYTOVENE) IVPB  5 mg/kg Intravenous Q12H    levalbuterol  1.25 mg Nebulization Q8H    mycophenolate mofetil  500 mg Per NG tube Q12H    [START ON 8/23/2017] sulfamethoxazole-trimethoprim 200-40 mg/5 ml  20 mL Per NG tube Every Mon, Wed, Fri    tacrolimus  0.5 mg Per NG tube BID    vancomycin (VANCOCIN) IVPB  15 mg/kg Intravenous Q12H     PRN Meds:acetaminophen, dextrose 50%, dextrose 50%, magnesium sulfate IVPB **AND** magnesium sulfate IVPB, ondansetron, potassium chloride  10% **AND** potassium chloride 10% **AND** potassium chloride 10%, promethazine (PHENERGAN) IVPB    Review of patient's allergies indicates:  No Known Allergies    Review of Systems   Unable to perform ROS: Intubated     Objective:   Physical Exam   Constitutional: No distress.   HENT:   Head: Normocephalic and atraumatic.   Nose: Nose normal.   Mouth/Throat: Oropharynx is clear and moist. No oropharyngeal exudate.   ETT + OG in place   Eyes: Conjunctivae and EOM are normal. Pupils are equal, round, and reactive to light. No scleral icterus.   Neck: Normal range of motion. Neck supple. No JVD present. No tracheal deviation present. No thyromegaly present.   R introducer with TLC   Cardiovascular: Normal rate, regular rhythm and normal heart sounds.    Pulmonary/Chest: Effort normal and breath sounds normal. No respiratory distress. He has no wheezes. He has no rales. He exhibits no tenderness.   Chest tubes in place with serosanguinous output and no air leaks.   Abdominal: Soft. Bowel sounds are normal. He exhibits no distension. There is no tenderness.   Musculoskeletal: Normal range of motion. He exhibits no edema or tenderness.   Neurological: No cranial nerve deficit.   Sedated   Skin: Skin is warm and dry. He is not diaphoretic.         Vital Signs (Most Recent):  Temp: 98.4 °F (36.9 °C) (08/22/17 1600)  Pulse: 97 (08/22/17 1630)  Resp: 20 (08/22/17 1630)  BP: 109/75 (08/22/17 1430)  SpO2: 98 % (08/22/17 1630) Vital Signs (24h Range):  Temp:  [98 °F (36.7 °C)-98.8 °F (37.1 °C)] 98.4 °F (36.9 °C)  Pulse:  [] 97  Resp:  [2-26] 20  SpO2:  [92 %-100 %] 98 %  BP: (109-133)/(69-77) 109/75  Arterial Line BP: ()/(55-71) 105/59     Weight: 98.6 kg (217 lb 6 oz)  Body mass index is 31.19 kg/m².      Intake/Output Summary (Last 24 hours) at 08/22/17 1643  Last data filed at 08/22/17 1600   Gross per 24 hour   Intake             2618 ml   Output             1154 ml   Net             1464 ml       Ventilator  Data:     Vent Mode: A/C  Oxygen Concentration (%):  [40-50] 40  Resp Rate Total:  [20 br/min-26 br/min] 20 br/min  Vt Set:  [0 mL] 0 mL  PEEP/CPAP:  [5 cmH20-10 cmH20] 10 cmH20  Pressure Support:  [0 cmH20] 0 cmH20  Mean Airway Pressure:  [12 cmH20-15 cmH20] 15 cmH20    Hemodynamic Parameters:  CO:  [3.5 L/min-4.4 L/min] 4.4 L/min  CI:  [1.6 L/min/m2-2 L/min/m2] 2 L/min/m2    Lines/Drains:       Peripheral IV - Single Lumen 08/21/17 2000 Right Forearm (Active)   Site Assessment Clean;Dry;Intact;No redness;No swelling 8/21/2017  8:00 PM   Line Status Flushed;Saline locked 8/21/2017  8:00 PM   Dressing Status Dry;Clean;Intact 8/21/2017  8:00 PM   Dressing Intervention New dressing 8/21/2017  8:00 PM   Dressing Change Due 08/25/17 8/21/2017  8:00 PM   Site Change Due 08/25/17 8/21/2017  8:00 PM   Reason Not Rotated Not due 8/21/2017  8:00 PM   Number of days: 0            Urethral Catheter 08/22/17 0521 Temperature probe;Non-latex 16 Fr. (Active)   Output (mL) 4 mL 8/22/2017  4:00 PM   Number of days: 0            Y Chest Tube 1 and 2 08/22/17 1242 Right Pleural;Mediastinal 19 Fr. Left Pleural;Mediastinal 19 Fr. (Active)   Output (mL) 10 mL 8/22/2017  4:00 PM   Number of days: 0            Y Chest Tube 3 and 4 08/22/17 1243 Left Pleural 19 Fr. Right Pleural 19 Fr. (Active)   Output (mL) 70 mL 8/22/2017  4:00 PM   Number of days: 0       Significant Labs:  CBC:    Recent Labs  Lab 08/22/17  1420   WBC 44.87*   RBC 4.17*   HGB 12.8*   HCT 39.2*      MCV 94   MCH 30.7   MCHC 32.7     BMP:    Recent Labs  Lab 08/22/17  1420      K 3.4*      CO2 18*   BUN 18   CREATININE 1.2   CALCIUM 7.9*      Tacrolimus Levels:  No results for input(s): TACROLIMUS in the last 72 hours.  Microbiology:  Microbiology Results (last 7 days)     Procedure Component Value Units Date/Time    Gram stain [[id number]] Collected:  08/22/17 0814    Order Status:  Completed Specimen:  Tissue from Lung, Lingula Updated:  08/22/17 0948      Gram Stain Result No WBC's      No organisms seen    Narrative:       Bronchus of donor lung    Fungus culture [[id number]] Collected:  08/22/17 0815    Order Status:  Sent Specimen:  Tissue from Lung, Lingula Updated:  08/22/17 0858    AFB Culture & Smear [[id number]] Collected:  08/22/17 0814    Order Status:  Sent Specimen:  Tissue from Lung, Lingula Updated:  08/22/17 0858    Aerobic culture [[id number]] Collected:  08/22/17 0816    Order Status:  Sent Specimen:  Tissue from Lung, Lingula Updated:  08/22/17 0857    Culture, Anaerobe [[id number]] Collected:  08/22/17 0814    Order Status:  Sent Specimen:  Tissue from Lung, Lingula Updated:  08/22/17 0857    Urine culture [[id number]] Collected:  08/21/17 2027    Order Status:  Sent Specimen:  Urine from Urine, Clean Catch Updated:  08/21/17 2135          I have reviewed all pertinent labs within the past 24 hours.    Diagnostic Results:  Chest X-Ray: no acute processes     No Further        Assessment/Plan:     Lung transplanted    There is no evidence of graft dysfunction. Will keep ventilated overnight with lung protective ventilation keeping tidal volumes <500. Will keep PEEP at 10 for recruitment. Continue CPT and bronchodilators. Continue to monitor chest tube output which is adequate. Will plan for bronchoscopy and possible extubation tomorrow.         Hypotension    Continue epinephrine and norepinephrine and wean as tolerated. Fluid resuscitation with albumin as needed.         Immunosuppression    Solu-Medrol administered in the OR. Basiliximab in the ICU. Will start tacrolimus and mycophenolate today. Start corticosteroid taper tomorrow.         Prophylactic antibiotic    Bactrim, valganciclovir, and diflucan for opportunistic infection prophylaxis. Ciprofloxacin and vancomycin for surgical prophylaxis.         Leukocytosis    Expected after transplant. Will continue to monitor.         Metabolic acidosis    Will ensure tissue perfusion to avoid ischemia  and further bicarbonate consumption. Bicarbonate drip is pH <7.20.         Hyperglycemia    Appreciate input from endocrinology        Hypokalemia    Replace as per order set.            Preventive Measures: Nutrition: Goal: NPO for now, Stress Ulcer: initiate prophyllaxis, DVT: initiate prophyllaxis, Head of Bet: eleavated    Counseling/Consultation:I discussed the patient's condition/prognosis with the family.    Critical Care Time greater than: 2 Hours     Darrick Wolfe MD  Lung Transplant  [facility]

## 2017-08-22 NOTE — ASSESSMENT & PLAN NOTE
Continue epinephrine and norepinephrine and wean as tolerated. Fluid resuscitation with albumin as needed.

## 2017-08-22 NOTE — PROGRESS NOTES
Pt left unit to OR with anesthesia in bed.  No apparent stress noted.  Showed pt family way to surgery waiting area.  Pt belongings kept with family.

## 2017-08-22 NOTE — TRANSFER OF CARE
"Anesthesia Transfer of Care Note    Patient: Martin Hendrix Jr.    Procedure(s) Performed: Procedure(s) (LRB):  TRANSPLANT-LUNG (Bilateral)    Patient location: ICU    Anesthesia Type: general    Transport from OR: Transported from OR intubated on 100% O2 by AMBU with adequate controlled ventilation. Upon arrival to PACU/ICU, patient attached to ventilator and auscultated to confirm bilateral breath sounds and adequate TV. Continuous ECG monitoring in transport. Continuous SpO2 monitoring in transport. Continuos invasive BP monitoring in transport    Post pain: adequate analgesia    Post assessment: no apparent anesthetic complications and tolerated procedure well    Post vital signs: stable    Level of consciousness: sedated    Nausea/Vomiting: no nausea/vomiting    Complications: none    Transfer of care protocol was followed      Last vitals:   Visit Vitals  /77 (BP Location: Right arm, Patient Position: Lying)   Pulse 101   Temp 36.7 °C (98.1 °F) (Oral)   Resp (!) 5   Ht 5' 10" (1.778 m)   Wt 98.6 kg (217 lb 6 oz)   SpO2 100%   BMI 31.19 kg/m²     "

## 2017-08-22 NOTE — ASSESSMENT & PLAN NOTE
Solu-Medrol administered in the OR. Basiliximab in the ICU. Will start tacrolimus and mycophenolate today. Start corticosteroid taper tomorrow.

## 2017-08-22 NOTE — ANESTHESIA PROCEDURE NOTES
Final [facility]    Diagnosis: respiratory failure  Patient location during procedure: OR  Procedure start time: 8/22/2017 12:31 PM  Timeout: 8/22/2017 12:30 PM  Procedure end time: 8/22/2017 12:45 PM  Surgery related to: lung transplant  Exam type: Final  Staffing  Anesthesiologist: WILLY BRADSHAW  Other anesthesia staff: ERIC CONRAD  Performed: anesthesiologist and other anesthesia staff   Preanesthetic Checklist  Completed: patient identified, surgical consent, pre-op evaluation, timeout performed, risks and benefits discussed, monitors and equipment checked, anesthesia consent given, oxygen available, suction available, hand hygiene performed and patient being monitored    Exam  Estimated Ejection Fraction: > 55% normal  Regional Wall Abnormalities: no RWMA        Right Heart  Right Ventricle Function: normal      Aortic Valve:  Prosthesis: none  Regurgitation(color flow): 0-tr     Mitral Valve:  Prosthesis: none  Regurgitation(color flow): 0-tr     Tricuspid Valve:  Prosthesis: none  Regurgitation: 0-tr    Pulmonic Valve:  Prosthesis: none  Regurgitation(color flow): 0-tr      Aorta  Descending Aorta dissection: no  Descending aorta IABP: no  Aortic Arch Dissection: no  Ascending Aorta Dissection: no    LVAD:no        Effusions    Summary    Other Findings  S/p lung transplant, poor acoustic windows following bypass, Normal biventricular function  Valves structurally and functionally normal  No aortic dissection

## 2017-08-22 NOTE — CONSULTS
[facility]  Endocrinology  Diabetes Consult Note    Consult Requested by: Darrick Wolfe MD   Reason for admit: Sarcoidosis    HISTORY OF PRESENT ILLNESS:  Reason for Consult: Management of  Hyperglycemia     Surgical Procedure and Date:  8/22/17 s/p bilateral lung transplant       HPI:  Mr. 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 He is now s/p bilateral lung transplant with steroid induced hyperglycemia. Endocrine consulted for bg management.       Interval HPI:    intubated and sedated from OR, no hypoglycemia, insulin gtt infusing at 9 u/h  NPO    PMH, PSH, FH, SH updated and reviewed      Review of Systems   Unable to perform ROS: Intubated       Current Medications and/or Treatments Impacting Glycemic Control  Immunotherapy:    Immunosuppressants         Stop Route Frequency     mycophenolate mofetil 200 mg/mL suspension 500 mg      -- PER NG TUBE Every 12 hours     tacrolimus (PROGRAF) 1 mg/mL oral syringe      -- PER NG TUBE 2 times daily        Steroids:   Hormones     None        Pressors:    Autonomic Drugs     Start     Stop Route Frequency Ordered    08/22/17 1515  EPINEPHrine (ADRENALIN) 4 mg in sodium chloride 0.9% 250 mL infusion     Question Answer Comment   Titrate by: (in mcg/kg/min) 0.02    Titrate interval: (in minutes) 10    Titrate to maintain: (SBP or MAP or Cardiac Index) SBP    Greater than: (in mmHg) 90    Maximum dose: (in mcg/kg/min) 2        -- IV Continuous 08/22/17 1413    08/22/17 1515  norepinephrine 4 mg in dextrose 5% 250 mL infusion (premix) (titrating)     Question Answer Comment   Titrate by: (in mcg/kg/min) 0.02    Titrate interval: (in minutes) 15    Titrate to maintain: (MAP or SBP) SBP    Greater than: (in mmHg) 90    Maximum dose: (in mcg/kg/min) 3        -- IV Continuous 08/22/17 1413        Hyperglycemia/Diabetes Medications:   Antihyperglycemics     Start     Stop Route Frequency Ordered     08/22/17 1415  insulin regular (Humulin R) 100 Units in sodium chloride 0.9% 100 mL infusion      -- IV Continuous 08/22/17 1410               PHYSICAL EXAMINATION:Vitals:    08/22/17 1454   BP:    Pulse: 101   Resp: (!) 24   Temp:      Body mass index is 31.19 kg/m².    Physical Exam   Constitutional: He appears well-developed and well-nourished. He is sedated and intubated.   HENT:   Head: Normocephalic and atraumatic.   Eyes: EOM are normal. Pupils are equal, round, and reactive to light.   Neck: Neck supple. No tracheal deviation present.   Cardiovascular: Normal rate, regular rhythm and normal heart sounds.    Pulmonary/Chest: Effort normal and breath sounds normal. He is intubated.   Abdominal: Soft. Bowel sounds are decreased.   Neurological:   CAROLA intubated and sedated   Skin: Skin is warm and dry.   Mid-sternal dressing dry and intact   Psychiatric:   CAROLA intubated and sedated   Vitals reviewed.        Labs Reviewed and Include     Recent Labs  Lab 08/22/17  1420   *   CALCIUM 7.9*   ALBUMIN 2.9*   PROT 5.7*      K 3.4*   CO2 18*      BUN 18   CREATININE 1.2   ALKPHOS 54*   ALT 22   *   BILITOT 2.3*     Lab Results   Component Value Date    WBC 44.87 (H) 08/22/2017    HGB 12.8 (L) 08/22/2017    HCT 39.2 (L) 08/22/2017    MCV 94 08/22/2017     08/22/2017     No results for input(s): TSH, FREET4 in the last 168 hours.  Lab Results   Component Value Date    HGBA1C 6.7 (H) 04/24/2017       Nutritional status:   Body mass index is 31.19 kg/m².  Lab Results   Component Value Date    ALBUMIN 2.9 (L) 08/22/2017    ALBUMIN 4.0 08/21/2017    ALBUMIN 4.0 08/10/2017     No results found for: PREALBUMIN    Estimated Creatinine Clearance: 82.8 mL/min (based on Cr of 1.2).    Accu-Checks  No results for input(s): POCTGLUCOSE in the last 72 hours.     ASSESSMENT and PLAN    Hyperglycemia    bg goal 140-180, Continue intensive insulin gtt protocol with q1h bg monitoring           Non morbid  obesity due to excess calories    May increase insulin resistance   Body mass index is 31.19 kg/m².            Immunosuppression    May increase insulin resistance           Adrenal cortical steroids causing adverse effect in therapeutic use    Increasing insulin needs          S/P lung transplant    Per LUT          * Sarcoidosis    S/p lung txp 8/22/17               Plan discussed with RN at bedside.     Елена Gross DNP, NP  Endocrinology  [facility]

## 2017-08-22 NOTE — SUBJECTIVE & OBJECTIVE
Interval HPI:    intubated and sedated from OR, no hypoglycemia, insulin gtt infusing at 9 u/h  NPO    PMH, PSH, FH, SH updated and reviewed      Review of Systems   Unable to perform ROS: Intubated       Current Medications and/or Treatments Impacting Glycemic Control  Immunotherapy:    Immunosuppressants         Stop Route Frequency     mycophenolate mofetil 200 mg/mL suspension 500 mg      -- PER NG TUBE Every 12 hours     tacrolimus (PROGRAF) 1 mg/mL oral syringe      -- PER NG TUBE 2 times daily        Steroids:   Hormones     None        Pressors:    Autonomic Drugs     Start     Stop Route Frequency Ordered    08/22/17 1515  EPINEPHrine (ADRENALIN) 4 mg in sodium chloride 0.9% 250 mL infusion     Question Answer Comment   Titrate by: (in mcg/kg/min) 0.02    Titrate interval: (in minutes) 10    Titrate to maintain: (SBP or MAP or Cardiac Index) SBP    Greater than: (in mmHg) 90    Maximum dose: (in mcg/kg/min) 2        -- IV Continuous 08/22/17 1413    08/22/17 1515  norepinephrine 4 mg in dextrose 5% 250 mL infusion (premix) (titrating)     Question Answer Comment   Titrate by: (in mcg/kg/min) 0.02    Titrate interval: (in minutes) 15    Titrate to maintain: (MAP or SBP) SBP    Greater than: (in mmHg) 90    Maximum dose: (in mcg/kg/min) 3        -- IV Continuous 08/22/17 1413        Hyperglycemia/Diabetes Medications:   Antihyperglycemics     Start     Stop Route Frequency Ordered    08/22/17 1415  insulin regular (Humulin R) 100 Units in sodium chloride 0.9% 100 mL infusion      -- IV Continuous 08/22/17 1410               PHYSICAL EXAMINATION:Vitals:    08/22/17 1454   BP:    Pulse: 101   Resp: (!) 24   Temp:      Body mass index is 31.19 kg/m².    Physical Exam   Constitutional: He appears well-developed and well-nourished. He is sedated and intubated.   HENT:   Head: Normocephalic and atraumatic.   Eyes: EOM are normal. Pupils are equal, round, and reactive to light.   Neck: Neck supple. No tracheal  deviation present.   Cardiovascular: Normal rate, regular rhythm and normal heart sounds.    Pulmonary/Chest: Effort normal and breath sounds normal. He is intubated.   Abdominal: Soft. Bowel sounds are decreased.   Neurological:   CAROLA intubated and sedated   Skin: Skin is warm and dry.   Mid-sternal dressing dry and intact   Psychiatric:   CAROLA intubated and sedated   Vitals reviewed.

## 2017-08-22 NOTE — PROGRESS NOTES
"8/21/17    ----------On call lung donor organ offer-----------    15:38 Contacted by Dr. Wolfe with verbal orders to contact the patient with instructions to come to [facility] for potential of lung transplant.    15:40 Contacted [address] [facility]ks[facility],  re: donor details and to let him know that the patient will be instructed to travel to [facility] - will forward patient ETA once obtained - surgeon: Dr. German - no OR time yet.    16:10 Patient contacted, explained that we have received a donor offer for him; conducted a comprehensive assessment of his current medical condition - patient denies: GI,  s/s, no lacerations/abrasions/rashes/skin lesions; has received no blood transfusions in the past 3 months, or recently been hospitalized for exacerbation of his lung disease; instructed patient to begin NPO status now. Instructed patient to come to [facility] and go to the hospital admit office after taking a shower and collecting personal items needed for the hospital stay; keep his  cell phone charged, on and where it can be easily answered even if he's already in the hospital building. Reviewed process for reaching on call coordinator (prn) by calling [phone] and asking to have "on call" Lung Transplant coordinator paged. Patient made aware that for any number of reasons, the transplant surgery may be called off which could result in a trip w/ no transplant surgery. Patient verbalized his understanding of all points discussed and readiness for transplant at this time w/ willingness to accept either outcome. Also instructed to bring his CPAP machine and supplies. Estimated his ETA to be 2-2.5 hours.    16:50 Message sent to [address] Barksdale  w/ recipient ETA to admit.    16:55 the following people were notified: TSU charge RN - Lisa; Nursing supervisor - Nemo; ICU charge RN - Nestor; Admit/Transfer Center - Alina; HLA - Noemi (retrospective CXM); OR Digna Jara.    21:43  " Contacted by [address] Barks[facility],  with OR time: 03:30 (8/22/17).    21:50 the following people were notified of OR time: TSU charge RN - Sukhi; Nursing supervisor - Sukhi; OR - Iglesia; CTS resident: Pepito Jaimes MD; message sent to Dr. Wolfe.

## 2017-08-22 NOTE — H&P
Pulmonary / Critical Care Medicine  History and Physical    Attending:  Darrick Wolfe MD   Service: Lung Transplant   Fellow: Thony Thao MD     Chief Complaint  Here for lung transplant     History of Present Illness:  Mr. 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  Initially treated with prednisone with minimal disease control, and transitioned to MTX soon thereafter, with adequate disease control until late last year, when he developed worsening dyspnea.  He was subsequently found to have significant PAH and was started on an ERA/PDE inhibitor.  Since that time, 6MWT and PFTs have steadily worsened.  He is here this evening for lung transplant.  Aside from his baseline dyspnea, he has no new complaints this evening.     Past Medical History:  Past Medical History:   Diagnosis Date    On home oxygen therapy     KRISTYN on CPAP     Osteopenia     Pulmonary hypertension     Sarcoidosis     Shortness of breath         Past Surgical History:  Past Surgical History:   Procedure Laterality Date    ABDOMINAL SURGERY      6 weeks old    BRONCHOSCOPY      CHEST TUBE INSERTION      CHOLECYSTECTOMY      LUNG BIOPSY          Allergies:  Review of patient's allergies indicates:  No Known Allergies     Medications:   Home Medications:  No current facility-administered medications on file prior to encounter.      Current Outpatient Prescriptions on File Prior to Encounter   Medication Sig Dispense Refill    ACCU-CHEK DEANNE PLUS METER Misc USE AS DIRECTED  0    ACCU-CHEK DEANNE PLUS TEST STRP Strp CHECK BLOOD GLUCOSE ONCE DAILY  5    ACCU-CHEK SOFTCLIX LANCETS Misc CHECK BLOOD GLUCOSE ONCE QD  5    acetaminophen (TYLENOL) 325 MG tablet Take 650 mg by mouth every 6 (six) hours as needed.      albuterol sulfate 2.5 mg/0.5 mL Nebu Inhale 2.5 mg into the lungs 3 (three) times daily as needed.      ambrisentan (LETAIRIS) 10 MG Tab Take 1 tablet (10 mg total) by mouth  once daily. 30 tablet 11    aspirin (ECOTRIN) 81 MG EC tablet Take 81 mg by mouth once daily.      fluticasone-salmeterol 500-50 mcg/dose (ADVAIR DISKUS) 500-50 mcg/dose DsDv diskus inhaler Inhale 1 puff into the lungs 2 (two) times daily.      furosemide (LASIX) 40 MG tablet Take 1 tablet by mouth once daily.  2    methotrexate 2.5 MG Tab Take 10 mg by mouth once a week.      multivitamin (THERAGRAN) per tablet Take 1 tablet by mouth once daily.      potassium chloride SA (K-DUR,KLOR-CON) 10 MEQ tablet Take 1 tablet by mouth once daily.  2    PROAIR HFA 90 mcg/actuation inhaler Inhale 1 puff into the lungs every 6 (six) hours as needed.  11    tadalafil, PULMONARY HYPERTENSION, (ADCIRCA) 20 mg Tab Take 2 tablets (40 mg total) by mouth once daily. 28 tablet 11         Current Medications:  Scheduled:       Continuous Infusions:       PRN:   vancomycin (VANCOCIN) IVPB     Social History:  Tobacco:  reports that he has never smoked. He has never used smokeless tobacco.   EtOH:  reports that he does not drink alcohol.   Illicit Drugs: History   Drug Use No      Occupation Data Unavailable.       Family History:  Mother: family history includes Diabetes in his brother and father; Hypertension in his mother; Kidney disease in his sister.   Father:      Review of Systems:  · Other than those symptoms mentioned above, an extensive review of systems was unremarkable.     Vital Signs   Temp:  [98.8 °F (37.1 °C)]   Pulse:  [88]   Resp:  [20]   BP: (133)/(77)   SpO2:  [93 %]    Physical Exam   Gen: Genwell developed, well nourished, appears stated age, no distress   HEENT: lips, mucosa, and tongue normal; teeth and gums normal and no throat erythema  conjunctivae/corneas clear. PERRL., pupils responsive   CVS: S2: increased P2   Chest: clear to auscultation bilaterally, normal respiratory effort and normal percussion bilaterally   Abdomen: soft, non-tender non-distended; bowel sounds normal   Ext: no cyanosis or edema,  or clubbing   Skin: no rashes, no ecchymoses, no petechiae   Neuro: grossly non-focal   Lines: PIV      Labs     Recent Labs  Lab 08/21/17  2030   WBC 7.27   RBC 4.34*   HGB 13.1*   HCT 40.7      MCV 94   MCH 30.2   MCHC 32.2   INR 0.9   APTT 25.0     ABG   7.440 / 47.5 / 63 / 32.2 / 92   [facility] CXR              CT Chest 08/21/2017 9/2016 I personally reviewed the films and findings are:, Diffuse bilateral parynchemal disease with cardiomegaly (particularly right sided) and bilateral hilar prominence suggestive of LAD.   Enlarged pulmonary artery with bilateral symmetrically distributed mosaicism with some interstitial disease; bilateral hilar LAD.      Other Studies:   PFTs   8/16   Mild obstruction with evidence of restriction and reduced DLCO 56% predicted   Echo 8/10 PAP 52; mild MR/TR with mildly increased RV systolic size with normal function.        Assessment/Plan:  1. 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  · No significant changes otherwise at the moment.    Thony Thao MD  Pulmonary / Critical Care Fellow  Cell [phone]  08/21/2017  9:00 PM

## 2017-08-22 NOTE — SUBJECTIVE & OBJECTIVE
Subjective:     Interval History: S/P Bilateral Lung Transplant POD#0    Mr. Hendrix received his bilateral lung transplant earlier today. Surgery was complicated with a difficult dissection of calcified lymph nodes and also difficulty with bronchial anastomosis because of calcification in the bronchial tissue. He received 4 units of FFP intraoperatively. He arrived to the ICU in stable condition. He is on norepinephrine and epinephrine, both less than 0.1 mcg/min/kg.    CMV status is D+/R-. Retrospective crossmatch is not available yet.    Continuous Infusions:   epinephrine infusion 0.06 mcg/kg/min (08/22/17 1600)    fentanyl 100 mcg/hr (08/22/17 1600)    insulin (HUMAN R) infusion (adults) 16 Units/hr (08/22/17 1600)    norepinephrine bitartrate-D5W 0.08 mcg/kg/min (08/22/17 1600)    propofol 50 mcg/kg/min (08/22/17 1600)    sodium bicarbonate in D5W       Scheduled Meds:   basiliximab (SIMULECT) chemo infusion  20 mg Intravenous Once Pre-Op    chlorhexidine  15 mL Mouth/Throat BID    ciprofloxacin  400 mg Intravenous Q12H    famotidine (PF)  20 mg Intravenous BID    fluconazole 40 mg/ml  400 mg Per NG tube Daily    ganciclovir (CYTOVENE) IVPB  5 mg/kg Intravenous Q12H    levalbuterol  1.25 mg Nebulization Q8H    mycophenolate mofetil  500 mg Per NG tube Q12H    [START ON 8/23/2017] sulfamethoxazole-trimethoprim 200-40 mg/5 ml  20 mL Per NG tube Every Mon, Wed, Fri    tacrolimus  0.5 mg Per NG tube BID    vancomycin (VANCOCIN) IVPB  15 mg/kg Intravenous Q12H     PRN Meds:acetaminophen, dextrose 50%, dextrose 50%, magnesium sulfate IVPB **AND** magnesium sulfate IVPB, ondansetron, potassium chloride 10% **AND** potassium chloride 10% **AND** potassium chloride 10%, promethazine (PHENERGAN) IVPB    Review of patient's allergies indicates:  No Known Allergies    Review of Systems   Unable to perform ROS: Intubated     Objective:   Physical Exam   Constitutional: No distress.   HENT:   Head:  Normocephalic and atraumatic.   Nose: Nose normal.   Mouth/Throat: Oropharynx is clear and moist. No oropharyngeal exudate.   ETT + OG in place   Eyes: Conjunctivae and EOM are normal. Pupils are equal, round, and reactive to light. No scleral icterus.   Neck: Normal range of motion. Neck supple. No JVD present. No tracheal deviation present. No thyromegaly present.   R introducer with TLC   Cardiovascular: Normal rate, regular rhythm and normal heart sounds.    Pulmonary/Chest: Effort normal and breath sounds normal. No respiratory distress. He has no wheezes. He has no rales. He exhibits no tenderness.   Chest tubes in place with serosanguinous output and no air leaks.   Abdominal: Soft. Bowel sounds are normal. He exhibits no distension. There is no tenderness.   Musculoskeletal: Normal range of motion. He exhibits no edema or tenderness.   Neurological: No cranial nerve deficit.   Sedated   Skin: Skin is warm and dry. He is not diaphoretic.         Vital Signs (Most Recent):  Temp: 98.4 °F (36.9 °C) (08/22/17 1600)  Pulse: 97 (08/22/17 1630)  Resp: 20 (08/22/17 1630)  BP: 109/75 (08/22/17 1430)  SpO2: 98 % (08/22/17 1630) Vital Signs (24h Range):  Temp:  [98 °F (36.7 °C)-98.8 °F (37.1 °C)] 98.4 °F (36.9 °C)  Pulse:  [] 97  Resp:  [2-26] 20  SpO2:  [92 %-100 %] 98 %  BP: (109-133)/(69-77) 109/75  Arterial Line BP: ()/(55-71) 105/59     Weight: 98.6 kg (217 lb 6 oz)  Body mass index is 31.19 kg/m².      Intake/Output Summary (Last 24 hours) at 08/22/17 1643  Last data filed at 08/22/17 1600   Gross per 24 hour   Intake             2618 ml   Output             1154 ml   Net             1464 ml       Ventilator Data:     Vent Mode: A/C  Oxygen Concentration (%):  [40-50] 40  Resp Rate Total:  [20 br/min-26 br/min] 20 br/min  Vt Set:  [0 mL] 0 mL  PEEP/CPAP:  [5 cmH20-10 cmH20] 10 cmH20  Pressure Support:  [0 cmH20] 0 cmH20  Mean Airway Pressure:  [12 cmH20-15 cmH20] 15 cmH20    Hemodynamic  Parameters:  CO:  [3.5 L/min-4.4 L/min] 4.4 L/min  CI:  [1.6 L/min/m2-2 L/min/m2] 2 L/min/m2    Lines/Drains:       Peripheral IV - Single Lumen 08/21/17 2000 Right Forearm (Active)   Site Assessment Clean;Dry;Intact;No redness;No swelling 8/21/2017  8:00 PM   Line Status Flushed;Saline locked 8/21/2017  8:00 PM   Dressing Status Dry;Clean;Intact 8/21/2017  8:00 PM   Dressing Intervention New dressing 8/21/2017  8:00 PM   Dressing Change Due 08/25/17 8/21/2017  8:00 PM   Site Change Due 08/25/17 8/21/2017  8:00 PM   Reason Not Rotated Not due 8/21/2017  8:00 PM   Number of days: 0            Urethral Catheter 08/22/17 0521 Temperature probe;Non-latex 16 Fr. (Active)   Output (mL) 4 mL 8/22/2017  4:00 PM   Number of days: 0            Y Chest Tube 1 and 2 08/22/17 1242 Right Pleural;Mediastinal 19 Fr. Left Pleural;Mediastinal 19 Fr. (Active)   Output (mL) 10 mL 8/22/2017  4:00 PM   Number of days: 0            Y Chest Tube 3 and 4 08/22/17 1243 Left Pleural 19 Fr. Right Pleural 19 Fr. (Active)   Output (mL) 70 mL 8/22/2017  4:00 PM   Number of days: 0       Significant Labs:  CBC:    Recent Labs  Lab 08/22/17  1420   WBC 44.87*   RBC 4.17*   HGB 12.8*   HCT 39.2*      MCV 94   MCH 30.7   MCHC 32.7     BMP:    Recent Labs  Lab 08/22/17  1420      K 3.4*      CO2 18*   BUN 18   CREATININE 1.2   CALCIUM 7.9*      Tacrolimus Levels:  No results for input(s): TACROLIMUS in the last 72 hours.  Microbiology:  Microbiology Results (last 7 days)     Procedure Component Value Units Date/Time    Gram stain [[id number]] Collected:  08/22/17 0814    Order Status:  Completed Specimen:  Tissue from Lung, Lingula Updated:  08/22/17 0948     Gram Stain Result No WBC's      No organisms seen    Narrative:       Bronchus of donor lung    Fungus culture [[id number]] Collected:  08/22/17 0815    Order Status:  Sent Specimen:  Tissue from Lung, Lingula Updated:  08/22/17 0858    AFB Culture & Smear [[id number]] Collected:   08/22/17 0814    Order Status:  Sent Specimen:  Tissue from Lung, Ling[place] Updated:  08/22/17 0858    Aerobic culture [[id number]] Collected:  08/22/17 0816    Order Status:  Sent Specimen:  Tissue from Lung, Lingula Updated:  08/22/17 0857    Culture, Anaerobe [[id number]] Collected:  08/22/17 0814    Order Status:  Sent Specimen:  Tissue from Lung, Lingula Updated:  08/22/17 0857    Urine culture [[id number]] Collected:  08/21/17 2027    Order Status:  Sent Specimen:  Urine from Urine, Clean Catch Updated:  08/21/17 2135          I have reviewed all pertinent labs within the past 24 hours.    Diagnostic Results:  Chest X-Ray: no acute processes     No Further

## 2017-08-22 NOTE — ANESTHESIA PROCEDURE NOTES
[facility]    Diagnosis: respiratory failure  Patient location during procedure: OR  Procedure start time: 8/22/2017 4:01 AM  Timeout: 8/22/2017 4:00 AM  Procedure end time: 8/22/2017 4:30 AM  Surgery related to: respiratory failure  Exam type: Baseline  Staffing  Anesthesiologist: MUNA MILTON  Performed: anesthesiologist   Preanesthetic Checklist  Completed: patient identified, surgical consent, pre-op evaluation, timeout performed, risks and benefits discussed, monitors and equipment checked, anesthesia consent given, oxygen available, suction available, hand hygiene performed and patient being monitored  Setup & Induction  Patient preparation: bite block inserted  Probe Insertion: easy  Exam: complete  Exam         LVAD:no  Estimated Ejection Fraction: > 55% normal  Regional Wall Abnormalities: no RWMA            Right Heart  Right Ventricle Function: normal    Intra Atrial Septum  PFO: no shunt by color flow doppler  no IAS aneurysm  no lipomatous hypertrophy  no Atrial Septal Defect (Asd)    Right Ventricle    Aortic Valve:  Stenosis: none  Morphology: trileaflet  Regurgitation: no aortic valve regurgitation     Mitral Valve:  Morphology:normal  Jet Description: mild and centrally-directed    Tricuspid Valve:  Morphology: normal  Regurgitation: mild    Pulmonic Valve:  Morphology:normal  Regurgitation(color flow): none    Great Vessels  Ascending Aorta Atherosclerosis: 1=nl-min dz  Aortic Arch Atherosclerosis: 1=nl-min dz  IABP: no  Descending Aorta Atherosclerosis: 1=nl-min dz  Aorta    Descending aorta IABP: no    Effusions  Effusions: none    Summary  Findings discussed with surgeon.    Other Findings

## 2017-08-22 NOTE — ANESTHESIA PREPROCEDURE EVALUATION
08/21/2017  Pre-operative evaluation for Procedure(s) (LRB):  TRANSPLANT-LUNG (Bilateral)    Martin Hendrix Jr. is a 56 y.o. male with a pmhx of sarcoidosis, chronic pulmonary heart disease, and secondary pulmonary HTn who presents for a lung transplant.     LDA:   - 22G right forearm      Patient Active Problem List   Diagnosis    Lung transplant candidate    Sarcoidosis    Secondary pulmonary hypertension    Chronic respiratory failure with hypoxia    Chronic pulmonary heart disease    Awaiting organ transplant status    Pulmonary hypertension associated with sarcoidosis       Review of patient's allergies indicates:  No Known Allergies     No current facility-administered medications on file prior to encounter.      Current Outpatient Prescriptions on File Prior to Encounter   Medication Sig Dispense Refill    ACCU-CHEK DEANNE PLUS METER Misc USE AS DIRECTED  0    ACCU-CHEK DEANNE PLUS TEST STRP Strp CHECK BLOOD GLUCOSE ONCE DAILY  5    ACCU-CHEK SOFTCLIX LANCETS Misc CHECK BLOOD GLUCOSE ONCE QD  5    acetaminophen (TYLENOL) 325 MG tablet Take 650 mg by mouth every 6 (six) hours as needed.      albuterol sulfate 2.5 mg/0.5 mL Nebu Inhale 2.5 mg into the lungs 3 (three) times daily as needed.      ambrisentan (LETAIRIS) 10 MG Tab Take 1 tablet (10 mg total) by mouth once daily. 30 tablet 11    aspirin (ECOTRIN) 81 MG EC tablet Take 81 mg by mouth once daily.      fluticasone-salmeterol 500-50 mcg/dose (ADVAIR DISKUS) 500-50 mcg/dose DsDv diskus inhaler Inhale 1 puff into the lungs 2 (two) times daily.      furosemide (LASIX) 40 MG tablet Take 1 tablet by mouth once daily.  2    methotrexate 2.5 MG Tab Take 10 mg by mouth once a week.      multivitamin (THERAGRAN) per tablet Take 1 tablet by mouth once daily.      potassium chloride SA (K-DUR,KLOR-CON) 10 MEQ tablet Take 1 tablet by mouth  once daily.  2    PROAIR HFA 90 mcg/actuation inhaler Inhale 1 puff into the lungs every 6 (six) hours as needed.  11    tadalafil, PULMONARY HYPERTENSION, (ADCIRCA) 20 mg Tab Take 2 tablets (40 mg total) by mouth once daily. 28 tablet 11       Past Surgical History:   Procedure Laterality Date    ABDOMINAL SURGERY      6 weeks old    BRONCHOSCOPY      CHEST TUBE INSERTION      CHOLECYSTECTOMY      LUNG BIOPSY         Social History     Social History    Marital status:      Spouse name: N/A    Number of children: N/A    Years of education: N/A     Occupational History    Not on file.     Social History Main Topics    Smoking status: Never Smoker    Smokeless tobacco: Never Used    Alcohol use No    Drug use: No    Sexual activity: Not on file     Other Topics Concern    Not on file     Social History Narrative    No narrative on file         Vital Signs Range (Last 24H):  Temp:  [37.1 °C (98.8 °F)]   Pulse:  [88]   Resp:  [20]   BP: (133)/(77)   SpO2:  [93 %]       CBC:   Recent Labs      08/21/17 2030   WBC  7.27   RBC  4.34*   HGB  13.1*   HCT  40.7   PLT  259   MCV  94   MCH  30.2   MCHC  32.2       CMP:   Recent Labs      08/21/17 2030   NA  138   K  4.0   CL  101   CO2  32*   BUN  14   CREATININE  0.9   GLU  92   CALCIUM  9.2   ALBUMIN  4.0   PROT  7.7   ALKPHOS  76   ALT  18   AST  19   BILITOT  0.4       INR  Recent Labs      08/21/17 2030   INR  0.9   APTT  25.0           Diagnostic Studies:      EKG:  Normal sinus rhythm  Possible Left atrial enlargement  Right axis deviation  Right ventricular conduction delay  ST and T wave abnormality, consider anterior ischemia  Abnormal ECG  When compared with ECG of 17-FEB-2017 09:05,  No significant change was found  Confirmed by NATHANIEL ROBERTSON MD ([id number]) on 2/17/2017 4:05:05 PM    2D Echo:  CONCLUSIONS     1 - Upper limit of normal left ventricular enlargement.     2 - Low normal to mildly depressed left ventricular systolic function  (EF 50-55%).     3 - Biatrial enlargement.     4 - Indeterminate LV diastolic function.     5 - Right ventricle is upper limit of normal in size with normal systolic function.     6 - Mild to mild- moderate (1-2+) mitral regurgitation.     7 - Mild tricuspid regurgitation.     8 - Increased central venous pressure.     9 - Pulmonary hypertension. The estimated PA systolic pressure is 52 mmHg.     This document has been electronically    SIGNED BY: Yannick Holt MD On: 08/10/2017 14:09        Anesthesia Evaluation    I have reviewed the Patient Summary Reports.    I have reviewed the Nursing Notes.   I have reviewed the Medications.     Review of Systems  Anesthesia Hx:  No previous Anesthesia  History of prior surgery of interest to airway management or planning: Denies Family Hx of Anesthesia complications.   Denies Personal Hx of Anesthesia complications.   Hematology/Oncology:  Hematology Normal   Oncology Normal     EENT/Dental:EENT/Dental Normal   Pulmonary:   Shortness of breath Sleep Apnea   Sarcoidosis Respiratory Failure, Chronic Pulmonary Hypertension, secondary Echocardiographic Estimated Pulmonary Artery Systolic Pressure 45 - 60    Renal/:  Renal/ Normal     Hepatic/GI:  Hepatic/GI Normal    Musculoskeletal:  Musculoskeletal Normal    Neurological:  Neurology Normal    Endocrine:  Endocrine Normal    Dermatological:  Skin Normal    Psych:  Psychiatric Normal           Physical Exam  General:  Obesity    Airway/Jaw/Neck:  Airway Findings: Mouth Opening: Normal Tongue: Normal  General Airway Assessment: Adult  Mallampati: III  TM Distance: Normal, at least 6 cm     Eyes/Ears/Nose:  EYES/EARS/NOSE FINDINGS: Normal    Chest/Lungs:  Chest/Lungs Clear    Heart/Vascular:  Heart Findings: Normal       Mental Status:  Mental Status Findings: Normal        Anesthesia Plan  Type of Anesthesia, risks & benefits discussed:  Anesthesia Type:  general  Patient's Preference:   Intra-op Monitoring Plan: arterial line,  central line and standard ASA monitors  Intra-op Monitoring Plan Comments:   Post Op Pain Control Plan:   Post Op Pain Control Plan Comments:   Induction:   IV  Beta Blocker:  Patient is not currently on a Beta-Blocker (No further documentation required).       Informed Consent: Patient understands risks and agrees with Anesthesia plan.  Questions answered. Anesthesia consent signed with patient.  ASA Score: 4  emergent   Day of Surgery Review of History & Physical: I have interviewed and examined the patient. I have reviewed the patient's H&P dated:            Ready For Surgery From Anesthesia Perspective.

## 2017-08-22 NOTE — ASSESSMENT & PLAN NOTE
Will ensure tissue perfusion to avoid ischemia and further bicarbonate consumption. Bicarbonate drip is pH <7.20.

## 2017-08-23 PROBLEM — I48.91 ATRIAL FIBRILLATION: Status: ACTIVE | Noted: 2017-08-23

## 2017-08-23 PROBLEM — E87.6 HYPOKALEMIA: Status: RESOLVED | Noted: 2017-08-22 | Resolved: 2017-08-23

## 2017-08-23 PROBLEM — E87.20 METABOLIC ACIDOSIS: Status: RESOLVED | Noted: 2017-08-22 | Resolved: 2017-08-23

## 2017-08-23 LAB
ALBUMIN SERPL BCP-MCNC: 2.5 G/DL
ALBUMIN SERPL BCP-MCNC: 2.7 G/DL
ALBUMIN SERPL BCP-MCNC: 2.9 G/DL
ALBUMIN SERPL BCP-MCNC: 2.9 G/DL
ALBUMIN SERPL BCP-MCNC: 3.2 G/DL
ALLENS TEST: ABNORMAL
ALP SERPL-CCNC: 48 U/L
ALP SERPL-CCNC: 52 U/L
ALP SERPL-CCNC: 54 U/L
ALP SERPL-CCNC: 54 U/L
ALP SERPL-CCNC: 55 U/L
ALT SERPL W/O P-5'-P-CCNC: 31 U/L
ALT SERPL W/O P-5'-P-CCNC: 32 U/L
ALT SERPL W/O P-5'-P-CCNC: 37 U/L
ANION GAP SERPL CALC-SCNC: 11 MMOL/L
ANION GAP SERPL CALC-SCNC: 12 MMOL/L
ANION GAP SERPL CALC-SCNC: 6 MMOL/L
ANION GAP SERPL CALC-SCNC: 8 MMOL/L
ANION GAP SERPL CALC-SCNC: 8 MMOL/L
ANISOCYTOSIS BLD QL SMEAR: SLIGHT
AST SERPL-CCNC: 108 U/L
AST SERPL-CCNC: 108 U/L
AST SERPL-CCNC: 118 U/L
AST SERPL-CCNC: 91 U/L
AST SERPL-CCNC: 95 U/L
BASOPHILS # BLD AUTO: 0.02 K/UL
BASOPHILS # BLD AUTO: 0.04 K/UL
BASOPHILS # BLD AUTO: ABNORMAL K/UL
BASOPHILS NFR BLD: 0 %
BASOPHILS NFR BLD: 0.1 %
BILIRUB SERPL-MCNC: 0.4 MG/DL
BILIRUB SERPL-MCNC: 0.5 MG/DL
BILIRUB SERPL-MCNC: 0.7 MG/DL
BLD PROD TYP BPU: NORMAL
BLOOD UNIT EXPIRATION DATE: NORMAL
BLOOD UNIT TYPE CODE: 5100
BLOOD UNIT TYPE: NORMAL
BUN SERPL-MCNC: 26 MG/DL
BUN SERPL-MCNC: 28 MG/DL
BUN SERPL-MCNC: 28 MG/DL
BUN SERPL-MCNC: 32 MG/DL
BUN SERPL-MCNC: 35 MG/DL
CALCIUM SERPL-MCNC: 7.4 MG/DL
CALCIUM SERPL-MCNC: 7.8 MG/DL
CALCIUM SERPL-MCNC: 7.8 MG/DL
CALCIUM SERPL-MCNC: 7.9 MG/DL
CALCIUM SERPL-MCNC: 8.1 MG/DL
CHLORIDE SERPL-SCNC: 103 MMOL/L
CHLORIDE SERPL-SCNC: 104 MMOL/L
CHLORIDE SERPL-SCNC: 106 MMOL/L
CHLORIDE SERPL-SCNC: 106 MMOL/L
CHLORIDE SERPL-SCNC: 107 MMOL/L
CO2 SERPL-SCNC: 21 MMOL/L
CO2 SERPL-SCNC: 22 MMOL/L
CO2 SERPL-SCNC: 23 MMOL/L
CODING SYSTEM: NORMAL
CREAT SERPL-MCNC: 1.7 MG/DL
CREAT SERPL-MCNC: 1.8 MG/DL
CREAT SERPL-MCNC: 1.8 MG/DL
CREAT SERPL-MCNC: 2 MG/DL
CREAT SERPL-MCNC: 2.1 MG/DL
DELSYS: ABNORMAL
DIFFERENTIAL METHOD: ABNORMAL
DISPENSE STATUS: NORMAL
EOSINOPHIL # BLD AUTO: 0 K/UL
EOSINOPHIL # BLD AUTO: ABNORMAL K/UL
EOSINOPHIL NFR BLD: 0 %
ERYTHROCYTE [DISTWIDTH] IN BLOOD BY AUTOMATED COUNT: 13.2 %
ERYTHROCYTE [DISTWIDTH] IN BLOOD BY AUTOMATED COUNT: 13.3 %
ERYTHROCYTE [DISTWIDTH] IN BLOOD BY AUTOMATED COUNT: 13.4 %
ERYTHROCYTE [DISTWIDTH] IN BLOOD BY AUTOMATED COUNT: 13.7 %
ERYTHROCYTE [SEDIMENTATION RATE] IN BLOOD BY WESTERGREN METHOD: 20 MM/H
EST. GFR  (AFRICAN AMERICAN): 40 ML/MIN/1.73 M^2
EST. GFR  (AFRICAN AMERICAN): 42.5 ML/MIN/1.73 M^2
EST. GFR  (AFRICAN AMERICAN): 48.3 ML/MIN/1.73 M^2
EST. GFR  (AFRICAN AMERICAN): 48.3 ML/MIN/1.73 M^2
EST. GFR  (AFRICAN AMERICAN): 51.7 ML/MIN/1.73 M^2
EST. GFR  (NON AFRICAN AMERICAN): 34.6 ML/MIN/1.73 M^2
EST. GFR  (NON AFRICAN AMERICAN): 36.7 ML/MIN/1.73 M^2
EST. GFR  (NON AFRICAN AMERICAN): 41.7 ML/MIN/1.73 M^2
EST. GFR  (NON AFRICAN AMERICAN): 41.7 ML/MIN/1.73 M^2
EST. GFR  (NON AFRICAN AMERICAN): 44.7 ML/MIN/1.73 M^2
FIO2: 40
GLUCOSE SERPL-MCNC: 116 MG/DL
GLUCOSE SERPL-MCNC: 176 MG/DL
GLUCOSE SERPL-MCNC: 176 MG/DL
GLUCOSE SERPL-MCNC: 217 MG/DL
GLUCOSE SERPL-MCNC: 222 MG/DL
HCO3 UR-SCNC: 20 MMOL/L (ref 24–28)
HCO3 UR-SCNC: 22.7 MMOL/L (ref 24–28)
HCO3 UR-SCNC: 22.9 MMOL/L (ref 24–28)
HCO3 UR-SCNC: 25.9 MMOL/L (ref 24–28)
HCT VFR BLD AUTO: 32.3 %
HCT VFR BLD AUTO: 32.8 %
HCT VFR BLD AUTO: 35.7 %
HCT VFR BLD AUTO: 35.7 %
HCT VFR BLD AUTO: 35.8 %
HCT VFR BLD AUTO: 36.9 %
HGB BLD-MCNC: 10.9 G/DL
HGB BLD-MCNC: 11.3 G/DL
HGB BLD-MCNC: 11.9 G/DL
HGB BLD-MCNC: 11.9 G/DL
HGB BLD-MCNC: 12.3 G/DL
HGB BLD-MCNC: 12.5 G/DL
HYPOCHROMIA BLD QL SMEAR: ABNORMAL
HYPOCHROMIA BLD QL SMEAR: ABNORMAL
INR PPP: 1.1
IP: 18
IP: 18
IT: 0.8
IT: 0.8
LDH SERPL L TO P-CCNC: 2.02 MMOL/L (ref 0.36–1.25)
LYMPHOCYTES # BLD AUTO: 0.7 K/UL
LYMPHOCYTES # BLD AUTO: 0.9 K/UL
LYMPHOCYTES # BLD AUTO: 0.9 K/UL
LYMPHOCYTES # BLD AUTO: 1 K/UL
LYMPHOCYTES # BLD AUTO: 1 K/UL
LYMPHOCYTES # BLD AUTO: ABNORMAL K/UL
LYMPHOCYTES NFR BLD: 0.5 %
LYMPHOCYTES NFR BLD: 2 %
LYMPHOCYTES NFR BLD: 3 %
MAGNESIUM SERPL-MCNC: 1.5 MG/DL
MAGNESIUM SERPL-MCNC: 1.7 MG/DL
MAGNESIUM SERPL-MCNC: 1.8 MG/DL
MAGNESIUM SERPL-MCNC: 1.8 MG/DL
MAGNESIUM SERPL-MCNC: 2.1 MG/DL
MAP: 13
MCH RBC QN AUTO: 30.4 PG
MCH RBC QN AUTO: 30.5 PG
MCH RBC QN AUTO: 30.7 PG
MCH RBC QN AUTO: 30.9 PG
MCHC RBC AUTO-ENTMCNC: 33.3 G/DL
MCHC RBC AUTO-ENTMCNC: 33.3 G/DL
MCHC RBC AUTO-ENTMCNC: 33.7 G/DL
MCHC RBC AUTO-ENTMCNC: 33.9 G/DL
MCHC RBC AUTO-ENTMCNC: 34.4 G/DL
MCHC RBC AUTO-ENTMCNC: 34.5 G/DL
MCV RBC AUTO: 88 FL
MCV RBC AUTO: 88 FL
MCV RBC AUTO: 91 FL
MIN VOL: 14
MODE: ABNORMAL
MONOCYTES # BLD AUTO: 2.4 K/UL
MONOCYTES # BLD AUTO: 3.9 K/UL
MONOCYTES # BLD AUTO: 4.1 K/UL
MONOCYTES # BLD AUTO: ABNORMAL K/UL
MONOCYTES NFR BLD: 12.3 %
MONOCYTES NFR BLD: 12.3 %
MONOCYTES NFR BLD: 12.8 %
MONOCYTES NFR BLD: 13.4 %
MONOCYTES NFR BLD: 4.5 %
MONOCYTES NFR BLD: 6.5 %
NEUTROPHILS # BLD AUTO: 25.2 K/UL
NEUTROPHILS # BLD AUTO: 25.4 K/UL
NEUTROPHILS # BLD AUTO: 26.6 K/UL
NEUTROPHILS # BLD AUTO: 26.6 K/UL
NEUTROPHILS # BLD AUTO: 33.3 K/UL
NEUTROPHILS NFR BLD: 83.5 %
NEUTROPHILS NFR BLD: 84.1 %
NEUTROPHILS NFR BLD: 84.6 %
NEUTROPHILS NFR BLD: 84.6 %
NEUTROPHILS NFR BLD: 91.4 %
NEUTROPHILS NFR BLD: 92.5 %
NEUTS BAND NFR BLD MANUAL: 2.5 %
NUM UNITS TRANS FFP: NORMAL
OVALOCYTES BLD QL SMEAR: ABNORMAL
PCO2 BLDA: 40.3 MMHG (ref 35–45)
PCO2 BLDA: 43.4 MMHG (ref 35–45)
PCO2 BLDA: 48.5 MMHG (ref 35–45)
PCO2 BLDA: 54.1 MMHG (ref 35–45)
PEEP: 10
PEEP: 10
PEEP: 5
PEEP: 8
PH SMN: 7.28 [PH] (ref 7.35–7.45)
PH SMN: 7.29 [PH] (ref 7.35–7.45)
PH SMN: 7.3 [PH] (ref 7.35–7.45)
PH SMN: 7.33 [PH] (ref 7.35–7.45)
PIP: 26
PLATELET # BLD AUTO: 168 K/UL
PLATELET # BLD AUTO: 170 K/UL
PLATELET # BLD AUTO: 173 K/UL
PLATELET # BLD AUTO: 179 K/UL
PLATELET # BLD AUTO: 179 K/UL
PLATELET # BLD AUTO: 183 K/UL
PLATELET BLD QL SMEAR: ABNORMAL
PLATELET BLD QL SMEAR: ABNORMAL
PMV BLD AUTO: 10 FL
PMV BLD AUTO: 10 FL
PMV BLD AUTO: 9.1 FL
PMV BLD AUTO: 9.4 FL
PMV BLD AUTO: 9.6 FL
PMV BLD AUTO: 9.7 FL
PO2 BLDA: 114 MMHG (ref 80–100)
PO2 BLDA: 114 MMHG (ref 80–100)
PO2 BLDA: 79 MMHG (ref 80–100)
PO2 BLDA: 97 MMHG (ref 80–100)
POC BE: -1 MMOL/L
POC BE: -3 MMOL/L
POC BE: -4 MMOL/L
POC BE: -6 MMOL/L
POC SATURATED O2: 94 % (ref 95–100)
POC SATURATED O2: 96 % (ref 95–100)
POC SATURATED O2: 98 % (ref 95–100)
POC SATURATED O2: 98 % (ref 95–100)
POC TCO2: 21 MMOL/L (ref 23–27)
POC TCO2: 24 MMOL/L (ref 23–27)
POC TCO2: 24 MMOL/L (ref 23–27)
POC TCO2: 28 MMOL/L (ref 23–27)
POCT GLUCOSE: 108 MG/DL (ref 70–110)
POCT GLUCOSE: 112 MG/DL (ref 70–110)
POCT GLUCOSE: 118 MG/DL (ref 70–110)
POCT GLUCOSE: 129 MG/DL (ref 70–110)
POCT GLUCOSE: 130 MG/DL (ref 70–110)
POCT GLUCOSE: 130 MG/DL (ref 70–110)
POCT GLUCOSE: 164 MG/DL (ref 70–110)
POCT GLUCOSE: 165 MG/DL (ref 70–110)
POCT GLUCOSE: 165 MG/DL (ref 70–110)
POCT GLUCOSE: 166 MG/DL (ref 70–110)
POCT GLUCOSE: 172 MG/DL (ref 70–110)
POCT GLUCOSE: 176 MG/DL (ref 70–110)
POCT GLUCOSE: 188 MG/DL (ref 70–110)
POCT GLUCOSE: 190 MG/DL (ref 70–110)
POCT GLUCOSE: 190 MG/DL (ref 70–110)
POCT GLUCOSE: 191 MG/DL (ref 70–110)
POCT GLUCOSE: 193 MG/DL (ref 70–110)
POCT GLUCOSE: 196 MG/DL (ref 70–110)
POCT GLUCOSE: 197 MG/DL (ref 70–110)
POCT GLUCOSE: 199 MG/DL (ref 70–110)
POCT GLUCOSE: 203 MG/DL (ref 70–110)
POCT GLUCOSE: 211 MG/DL (ref 70–110)
POCT GLUCOSE: 214 MG/DL (ref 70–110)
POCT GLUCOSE: 218 MG/DL (ref 70–110)
POCT GLUCOSE: 220 MG/DL (ref 70–110)
POCT GLUCOSE: 222 MG/DL (ref 70–110)
POCT GLUCOSE: 224 MG/DL (ref 70–110)
POCT GLUCOSE: 227 MG/DL (ref 70–110)
POCT GLUCOSE: 229 MG/DL (ref 70–110)
POCT GLUCOSE: 242 MG/DL (ref 70–110)
POCT GLUCOSE: 247 MG/DL (ref 70–110)
POCT GLUCOSE: 251 MG/DL (ref 70–110)
POCT GLUCOSE: 263 MG/DL (ref 70–110)
POCT GLUCOSE: 79 MG/DL (ref 70–110)
POCT GLUCOSE: 95 MG/DL (ref 70–110)
POIKILOCYTOSIS BLD QL SMEAR: SLIGHT
POLYCHROMASIA BLD QL SMEAR: ABNORMAL
POTASSIUM SERPL-SCNC: 4.2 MMOL/L
POTASSIUM SERPL-SCNC: 4.6 MMOL/L
POTASSIUM SERPL-SCNC: 4.9 MMOL/L
POTASSIUM SERPL-SCNC: 5.7 MMOL/L
POTASSIUM SERPL-SCNC: 5.7 MMOL/L
PROT SERPL-MCNC: 4.8 G/DL
PROT SERPL-MCNC: 5.2 G/DL
PROT SERPL-MCNC: 5.4 G/DL
PROT SERPL-MCNC: 5.4 G/DL
PROT SERPL-MCNC: 5.6 G/DL
PROTHROMBIN TIME: 11.6 SEC
PROTHROMBIN TIME: 11.7 SEC
PROTHROMBIN TIME: 11.8 SEC
PROTHROMBIN TIME: 11.9 SEC
PS: 10
RBC # BLD AUTO: 3.55 M/UL
RBC # BLD AUTO: 3.71 M/UL
RBC # BLD AUTO: 3.91 M/UL
RBC # BLD AUTO: 3.91 M/UL
RBC # BLD AUTO: 4.05 M/UL
RBC # BLD AUTO: 4.05 M/UL
SAMPLE: ABNORMAL
SITE: ABNORMAL
SODIUM SERPL-SCNC: 135 MMOL/L
SODIUM SERPL-SCNC: 135 MMOL/L
SODIUM SERPL-SCNC: 136 MMOL/L
SODIUM SERPL-SCNC: 136 MMOL/L
SODIUM SERPL-SCNC: 137 MMOL/L
SP02: 100
SP02: 95
SP02: 98
SP02: 98
SPONT RATE: 29
TACROLIMUS BLD-MCNC: <1.5 NG/ML
VT: 556
VT: 800
WBC # BLD AUTO: 27.26 K/UL
WBC # BLD AUTO: 30.3 K/UL
WBC # BLD AUTO: 30.35 K/UL
WBC # BLD AUTO: 31.62 K/UL
WBC # BLD AUTO: 31.62 K/UL
WBC # BLD AUTO: 36.69 K/UL

## 2017-08-23 PROCEDURE — 25000003 PHARM REV CODE 250: Performed by: INTERNAL MEDICINE

## 2017-08-23 PROCEDURE — 85610 PROTHROMBIN TIME: CPT | Mod: NTX

## 2017-08-23 PROCEDURE — 99900035 HC TECH TIME PER 15 MIN (STAT)

## 2017-08-23 PROCEDURE — 27000221 HC OXYGEN, UP TO 24 HOURS: Mod: NTX

## 2017-08-23 PROCEDURE — 94761 N-INVAS EAR/PLS OXIMETRY MLT: CPT | Mod: NTX

## 2017-08-23 PROCEDURE — 0B978ZX DRAINAGE OF LEFT MAIN BRONCHUS, VIA NATURAL OR ARTIFICIAL OPENING ENDOSCOPIC, DIAGNOSTIC: ICD-10-PCS | Performed by: INTERNAL MEDICINE

## 2017-08-23 PROCEDURE — 31622 DX BRONCHOSCOPE/WASH: CPT | Mod: ,,, | Performed by: INTERNAL MEDICINE

## 2017-08-23 PROCEDURE — 27100171 HC OXYGEN HIGH FLOW UP TO 24 HOURS: Mod: NTX

## 2017-08-23 PROCEDURE — 85025 COMPLETE CBC W/AUTO DIFF WBC: CPT | Mod: 91

## 2017-08-23 PROCEDURE — 87015 SPECIMEN INFECT AGNT CONCNTJ: CPT

## 2017-08-23 PROCEDURE — 87102 FUNGUS ISOLATION CULTURE: CPT

## 2017-08-23 PROCEDURE — 99233 SBSQ HOSP IP/OBS HIGH 50: CPT | Mod: ,,, | Performed by: INTERNAL MEDICINE

## 2017-08-23 PROCEDURE — 87632 RESP VIRUS 6-11 TARGETS: CPT

## 2017-08-23 PROCEDURE — 20000000 HC ICU ROOM

## 2017-08-23 PROCEDURE — 80053 COMPREHEN METABOLIC PANEL: CPT | Mod: 91

## 2017-08-23 PROCEDURE — 80197 ASSAY OF TACROLIMUS: CPT | Mod: NTX

## 2017-08-23 PROCEDURE — 87070 CULTURE OTHR SPECIMN AEROBIC: CPT

## 2017-08-23 PROCEDURE — 94640 AIRWAY INHALATION TREATMENT: CPT

## 2017-08-23 PROCEDURE — 87205 SMEAR GRAM STAIN: CPT

## 2017-08-23 PROCEDURE — 83735 ASSAY OF MAGNESIUM: CPT | Mod: 91

## 2017-08-23 PROCEDURE — 80053 COMPREHEN METABOLIC PANEL: CPT | Mod: 91,NTX

## 2017-08-23 PROCEDURE — 94667 MNPJ CHEST WALL 1ST: CPT

## 2017-08-23 PROCEDURE — 87106 FUNGI IDENTIFICATION YEAST: CPT

## 2017-08-23 PROCEDURE — 63600175 PHARM REV CODE 636 W HCPCS: Mod: NTX | Performed by: INTERNAL MEDICINE

## 2017-08-23 PROCEDURE — 83735 ASSAY OF MAGNESIUM: CPT | Mod: 91,NTX

## 2017-08-23 PROCEDURE — 87186 SC STD MICRODIL/AGAR DIL: CPT

## 2017-08-23 PROCEDURE — 82803 BLOOD GASES ANY COMBINATION: CPT | Mod: NTX

## 2017-08-23 PROCEDURE — 99232 SBSQ HOSP IP/OBS MODERATE 35: CPT | Mod: NTX,,, | Performed by: NURSE PRACTITIONER

## 2017-08-23 PROCEDURE — 99900017 HC EXTUBATION W/PARAMETERS (STAT): Mod: NTX

## 2017-08-23 PROCEDURE — S0028 INJECTION, FAMOTIDINE, 20 MG: HCPCS | Mod: NTX | Performed by: NURSE PRACTITIONER

## 2017-08-23 PROCEDURE — 63600175 PHARM REV CODE 636 W HCPCS: Mod: NTX | Performed by: NURSE PRACTITIONER

## 2017-08-23 PROCEDURE — 36600 WITHDRAWAL OF ARTERIAL BLOOD: CPT | Mod: NTX

## 2017-08-23 PROCEDURE — 87116 MYCOBACTERIA CULTURE: CPT

## 2017-08-23 PROCEDURE — 87077 CULTURE AEROBIC IDENTIFY: CPT

## 2017-08-23 PROCEDURE — 25000003 PHARM REV CODE 250: Mod: NTX | Performed by: NURSE PRACTITIONER

## 2017-08-23 PROCEDURE — 25000242 PHARM REV CODE 250 ALT 637 W/ HCPCS: Performed by: NURSE PRACTITIONER

## 2017-08-23 PROCEDURE — 25000003 PHARM REV CODE 250: Mod: NTX | Performed by: INTERNAL MEDICINE

## 2017-08-23 PROCEDURE — 94150 VITAL CAPACITY TEST: CPT | Mod: NTX

## 2017-08-23 PROCEDURE — 27200966 HC CLOSED SUCTION SYSTEM: Mod: NTX

## 2017-08-23 PROCEDURE — 37799 UNLISTED PX VASCULAR SURGERY: CPT | Mod: NTX

## 2017-08-23 PROCEDURE — 0CJS8ZZ INSPECTION OF LARYNX, VIA NATURAL OR ARTIFICIAL OPENING ENDOSCOPIC: ICD-10-PCS | Performed by: OTOLARYNGOLOGY

## 2017-08-23 PROCEDURE — 94660 CPAP INITIATION&MGMT: CPT

## 2017-08-23 RX ORDER — TRAMADOL HYDROCHLORIDE 50 MG/1
100 TABLET ORAL EVERY 6 HOURS PRN
Status: DISCONTINUED | OUTPATIENT
Start: 2017-08-23 | End: 2017-09-01 | Stop reason: HOSPADM

## 2017-08-23 RX ORDER — ENOXAPARIN SODIUM 100 MG/ML
40 INJECTION SUBCUTANEOUS EVERY 24 HOURS
Status: DISCONTINUED | OUTPATIENT
Start: 2017-08-23 | End: 2017-09-01 | Stop reason: HOSPADM

## 2017-08-23 RX ORDER — PREDNISONE 20 MG/1
40 TABLET ORAL DAILY
Status: DISCONTINUED | OUTPATIENT
Start: 2017-08-29 | End: 2017-08-31

## 2017-08-23 RX ORDER — HYDROMORPHONE HYDROCHLORIDE 1 MG/ML
0.5 INJECTION, SOLUTION INTRAMUSCULAR; INTRAVENOUS; SUBCUTANEOUS ONCE
Status: DISCONTINUED | OUTPATIENT
Start: 2017-08-23 | End: 2017-08-24

## 2017-08-23 RX ORDER — PREDNISONE 20 MG/1
100 TABLET ORAL DAILY
Status: COMPLETED | OUTPATIENT
Start: 2017-08-26 | End: 2017-08-26

## 2017-08-23 RX ORDER — NAPROXEN SODIUM 220 MG/1
81 TABLET, FILM COATED ORAL DAILY
Status: DISCONTINUED | OUTPATIENT
Start: 2017-08-23 | End: 2017-08-24

## 2017-08-23 RX ORDER — OXYCODONE AND ACETAMINOPHEN 7.5; 325 MG/1; MG/1
1 TABLET ORAL EVERY 4 HOURS PRN
Status: DISCONTINUED | OUTPATIENT
Start: 2017-08-23 | End: 2017-09-01 | Stop reason: HOSPADM

## 2017-08-23 RX ORDER — HYDROMORPHONE HYDROCHLORIDE 1 MG/ML
0.5 INJECTION, SOLUTION INTRAMUSCULAR; INTRAVENOUS; SUBCUTANEOUS EVERY 6 HOURS PRN
Status: DISCONTINUED | OUTPATIENT
Start: 2017-08-23 | End: 2017-09-01 | Stop reason: HOSPADM

## 2017-08-23 RX ORDER — FUROSEMIDE 10 MG/ML
80 INJECTION INTRAMUSCULAR; INTRAVENOUS ONCE
Status: COMPLETED | OUTPATIENT
Start: 2017-08-23 | End: 2017-08-23

## 2017-08-23 RX ORDER — NAPROXEN SODIUM 220 MG/1
81 TABLET, FILM COATED ORAL DAILY
Status: DISCONTINUED | OUTPATIENT
Start: 2017-08-23 | End: 2017-08-23

## 2017-08-23 RX ADMIN — AMIODARONE HYDROCHLORIDE 0.5 MG/MIN: 1.8 INJECTION, SOLUTION INTRAVENOUS at 03:08

## 2017-08-23 RX ADMIN — SULFAMETHOXAZOLE AND TRIMETHOPRIM 20 ML: 200; 40 SUSPENSION ORAL at 08:08

## 2017-08-23 RX ADMIN — SODIUM CHLORIDE 250 ML: 0.9 INJECTION, SOLUTION INTRAVENOUS at 08:08

## 2017-08-23 RX ADMIN — PROPOFOL 25 MCG/KG/MIN: 10 INJECTION, EMULSION INTRAVENOUS at 05:08

## 2017-08-23 RX ADMIN — FAMOTIDINE 20 MG: 10 INJECTION, SOLUTION INTRAVENOUS at 09:08

## 2017-08-23 RX ADMIN — CHLORHEXIDINE GLUCONATE 15 ML: 1.2 RINSE ORAL at 08:08

## 2017-08-23 RX ADMIN — Medication 500 MG: at 08:08

## 2017-08-23 RX ADMIN — GANCICLOVIR SODIUM 493 MG: 500 INJECTION, POWDER, LYOPHILIZED, FOR SOLUTION INTRAVENOUS at 02:08

## 2017-08-23 RX ADMIN — SODIUM CHLORIDE 250 ML: 0.9 INJECTION, SOLUTION INTRAVENOUS at 11:08

## 2017-08-23 RX ADMIN — CIPROFLOXACIN 400 MG: 2 INJECTION, SOLUTION INTRAVENOUS at 03:08

## 2017-08-23 RX ADMIN — Medication 0.5 MG: at 08:08

## 2017-08-23 RX ADMIN — VANCOMYCIN HYDROCHLORIDE 1500 MG: 100 INJECTION, POWDER, LYOPHILIZED, FOR SOLUTION INTRAVENOUS at 05:08

## 2017-08-23 RX ADMIN — AMIODARONE HYDROCHLORIDE 150 MG: 1.5 INJECTION, SOLUTION INTRAVENOUS at 08:08

## 2017-08-23 RX ADMIN — FUROSEMIDE 80 MG: 10 INJECTION, SOLUTION INTRAVENOUS at 05:08

## 2017-08-23 RX ADMIN — DEXTROSE: 50 INJECTION, SOLUTION INTRAVENOUS at 09:08

## 2017-08-23 RX ADMIN — LEVALBUTEROL 1.25 MG: 1.25 SOLUTION, CONCENTRATE RESPIRATORY (INHALATION) at 11:08

## 2017-08-23 RX ADMIN — FLUCONAZOLE 400 MG: 40 POWDER, FOR SUSPENSION ORAL at 08:08

## 2017-08-23 RX ADMIN — Medication 0.12 MCG/KG/MIN: at 05:08

## 2017-08-23 RX ADMIN — LEVALBUTEROL 1.25 MG: 1.25 SOLUTION, CONCENTRATE RESPIRATORY (INHALATION) at 07:08

## 2017-08-23 RX ADMIN — ASPIRIN 81 MG CHEWABLE TABLET 81 MG: 81 TABLET CHEWABLE at 09:08

## 2017-08-23 RX ADMIN — ENOXAPARIN SODIUM 40 MG: 100 INJECTION SUBCUTANEOUS at 05:08

## 2017-08-23 RX ADMIN — FAMOTIDINE 20 MG: 10 INJECTION, SOLUTION INTRAVENOUS at 08:08

## 2017-08-23 RX ADMIN — LEVALBUTEROL 1.25 MG: 1.25 SOLUTION, CONCENTRATE RESPIRATORY (INHALATION) at 04:08

## 2017-08-23 RX ADMIN — HYDROMORPHONE HYDROCHLORIDE 0.5 MG: 1 INJECTION, SOLUTION INTRAMUSCULAR; INTRAVENOUS; SUBCUTANEOUS at 01:08

## 2017-08-23 RX ADMIN — CHLORHEXIDINE GLUCONATE 15 ML: 1.2 RINSE ORAL at 09:08

## 2017-08-23 RX ADMIN — AMIODARONE HYDROCHLORIDE 1 MG/MIN: 1.8 INJECTION, SOLUTION INTRAVENOUS at 08:08

## 2017-08-23 RX ADMIN — AMIODARONE HYDROCHLORIDE 0.5 MG/MIN: 1.8 INJECTION, SOLUTION INTRAVENOUS at 09:08

## 2017-08-23 RX ADMIN — SODIUM CHLORIDE 4.3 UNITS/HR: 9 INJECTION, SOLUTION INTRAVENOUS at 10:08

## 2017-08-23 RX ADMIN — GANCICLOVIR SODIUM 493 MG: 500 INJECTION, POWDER, LYOPHILIZED, FOR SOLUTION INTRAVENOUS at 01:08

## 2017-08-23 RX ADMIN — EPINEPHRINE 0.05 MCG/KG/MIN: 1 INJECTION PARENTERAL at 10:08

## 2017-08-23 RX ADMIN — Medication 0.5 MG: at 05:08

## 2017-08-23 RX ADMIN — VANCOMYCIN HYDROCHLORIDE 1500 MG: 100 INJECTION, POWDER, LYOPHILIZED, FOR SOLUTION INTRAVENOUS at 04:08

## 2017-08-23 NOTE — PROGRESS NOTES
Dr. Thao with critical care notified and aware of diminished a-line waveform, still able to draw labs and ABG's from it, but poor pressure reading.  Ordered to titrate pressors by cuff for time being. Pt low urine output for past two hours being 25cc and 10cc. Also made aware of following lab value:    Results for JAYE BORDEN JR. (MRN [id number]) as of 8/23/2017 03:22   Ref. Range 8/23/2017 02:30   Creatinine Latest Ref Range: 0.5 - 1.4 mg/dL 1.7 (H)     No new orders have been received at this time. Will continue to monitor closely.

## 2017-08-23 NOTE — PROGRESS NOTES
Dr. Thao notified of pt being in Afib with HR ~ 110. No new orders received at this time, will continue to monitor closely.

## 2017-08-23 NOTE — PROGRESS NOTES
"[facility]  Endocrinology  Progress Note    Admit Date: 8/21/2017     Reason for Consult: Management of  Hyperglycemia     Surgical Procedure and Date:  8/22/17 s/p bilateral lung transplant       HPI:  Mr. 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 He is now s/p bilateral lung transplant with steroid induced hyperglycemia. Endocrine consulted for bg management.       Interval HPI:   Intubated and sedated with pressor support. BG reasonable controlled overnight. Overall, insulin needs are decreasing, still with some drip rate variability.  On high dose SM taper beginning today.     /63 (BP Location: Right arm, Patient Position: Lying)   Pulse 109   Temp 100 °F (37.8 °C)   Resp 20   Ht 5' 10" (1.778 m)   Wt 101 kg (222 lb 10.6 oz)   SpO2 98%   BMI 31.95 kg/m²       Labs Reviewed and Include      Recent Labs  Lab 08/23/17  0507   *  176*   CALCIUM 7.8*  7.8*   ALBUMIN 2.9*  2.9*   PROT 5.4*  5.4*   *  135*   K 5.7*  5.7*   CO2 21*  21*     106   BUN 28*  28*   CREATININE 1.8*  1.8*   ALKPHOS 54*  54*   ALT 32  32   *  108*   BILITOT 0.5  0.5     Lab Results   Component Value Date    WBC 31.62 (H) 08/23/2017    WBC 31.62 (H) 08/23/2017    HGB 11.9 (L) 08/23/2017    HGB 11.9 (L) 08/23/2017    HCT 35.7 (L) 08/23/2017    HCT 35.7 (L) 08/23/2017    MCV 91 08/23/2017    MCV 91 08/23/2017     08/23/2017     08/23/2017     No results for input(s): TSH, FREET4 in the last 168 hours.  Lab Results   Component Value Date    HGBA1C 6.7 (H) 04/24/2017       Nutritional status:   Body mass index is 31.95 kg/m².  Lab Results   Component Value Date    ALBUMIN 2.9 (L) 08/23/2017    ALBUMIN 2.9 (L) 08/23/2017    ALBUMIN 3.2 (L) 08/23/2017     No results found for: PREALBUMIN    Estimated Creatinine Clearance: 55.9 mL/min (based on Cr of 1.8).    Accu-Checks  Recent Labs      08/22/17   2200  08/22/17   " 2307  08/23/17   0003   POCTGLUCOSE  211*  197*  172*       Current Medications and/or Treatments Impacting Glycemic Control  Immunotherapy:  Immunosuppressants         Stop Route Frequency     basiliximab (SIMULECT) 20 mg in sodium chloride 0.9% 50 mL chemo infusion      -- IV Once     mycophenolate mofetil 200 mg/mL suspension 500 mg      -- PER NG TUBE Every 12 hours     tacrolimus (PROGRAF) 1 mg/mL oral syringe      -- PER NG TUBE 2 times daily        Steroids:   Hormones     Start     Stop Route Frequency Ordered    08/29/17 0900  predniSONE tablet 40 mg      -- Oral Daily 08/23/17 0808    08/28/17 0900  predniSONE tablet 50 mg      08/29 0859 Oral Daily 08/23/17 0808    08/27/17 0900  predniSONE tablet 70 mg      08/28 0859 Oral Daily 08/23/17 0808    08/26/17 0900  predniSONE tablet 100 mg      08/27 0859 Oral Daily 08/23/17 0808    08/25/17 0900  methylPREDNISolone sodium succinate (SOLU-MEDROL) 200 mg in dextrose 5 % 100 mL IVPB      08/26 0859 IV Daily 08/23/17 0808    08/24/17 0900  methylPREDNISolone sodium succinate (SOLU-MEDROL) 300 mg in dextrose 5 % 100 mL IVPB      08/25 0859 IV Daily 08/23/17 0808    08/23/17 0915  methylPREDNISolone sodium succinate (SOLU-MEDROL) 500 mg in dextrose 5 % 100 mL IVPB      08/24 0859 IV Daily 08/23/17 0808        Pressors:    Autonomic Drugs     Start     Stop Route Frequency Ordered    08/22/17 1515  EPINEPHrine (ADRENALIN) 4 mg in sodium chloride 0.9% 250 mL infusion     Question Answer Comment   Titrate by: (in mcg/kg/min) 0.02    Titrate interval: (in minutes) 10    Titrate to maintain: (SBP or MAP or Cardiac Index) SBP    Greater than: (in mmHg) 90    Maximum dose: (in mcg/kg/min) 2        -- IV Continuous 08/22/17 1413    08/22/17 1515  norepinephrine 4 mg in dextrose 5% 250 mL infusion (premix) (titrating)     Question Answer Comment   Titrate by: (in mcg/kg/min) 0.02    Titrate interval: (in minutes) 15    Titrate to maintain: (MAP or SBP) SBP    Greater than:  (in mmHg) 90    Maximum dose: (in mcg/kg/min) 3        -- IV Continuous 08/22/17 1413        Hyperglycemia/Diabetes Medications: Antihyperglycemics     Start     Stop Route Frequency Ordered    08/22/17 1415  insulin regular (Humulin R) 100 Units in sodium chloride 0.9% 100 mL infusion      -- IV Continuous 08/22/17 1410          ASSESSMENT and PLAN    Hyperglycemia    Bg goal 140-180, Continue intensive insulin gtt protocol. Change BG monitoring to q2h.    Discharge planning is ongoing.          Sarcoidosis    S/p lung txp 8/22/17           Immunosuppression    May increase insulin resistance.  On high dose SM taper.           Non morbid obesity due to excess calories    May increase insulin resistance   Body mass index is 31.95 kg/m².                SANJAY Villalpando  Endocrinology  [facility]

## 2017-08-23 NOTE — PLAN OF CARE
Problem: Patient Care Overview  Goal: Plan of Care Review  Outcome: Ongoing (interventions implemented as appropriate)  POC reviewed with pt and daughter. Pt extubated, on 10 L high flow NC, sats >98%. AAOx4.  Pain controlled with PRN medication. Pt not yet passed bedside swallow, tolerated PO prograf. Martin removed. Amio bolus received, currently on maintenance at 0.5 mg/min, rate currently controlled in 70s. Levo and epi titrated to maintain SBP >90, levo currently off and epi at 0.05 mcg/kg/min. Insulin gtt titrated per protocol with hourly glucose monitoring. OOB to chair at 1730. Skin free from breakdown. Will continue to monitor.

## 2017-08-23 NOTE — PROGRESS NOTES
[facility]  Lung Transplant  Progress Note - Critical Care    Patient Name: Martin Hendrix Jr.  MRN: [id number]  Admission Date: 8/21/2017  Hospital Length of Stay: 2 days  Post-Operative Day:   Attending Physician: Darrick Wolfe MD  Primary Care Provider: Sukhi Dunham Jr, MD     Subjective:     Interval History: successfully extubated today.      Continuous Infusions:   amiodarone 1 mg/min (08/23/17 1300)    amiodarone      epinephrine infusion 0.04 mcg/kg/min (08/23/17 1300)    fentanyl 25 mcg/hr (08/23/17 1300)    insulin (HUMAN R) infusion (adults) 7.3 Units/hr (08/23/17 1300)    norepinephrine bitartrate-D5W Stopped (08/23/17 1200)     Scheduled Meds:   aspirin  81 mg Per OG tube Daily    [START ON 8/26/2017] basiliximab (SIMULECT) chemo infusion  20 mg Intravenous Once    chlorhexidine  15 mL Mouth/Throat BID    ciprofloxacin  400 mg Intravenous Q12H    enoxaparin  40 mg Subcutaneous Daily    famotidine (PF)  20 mg Intravenous BID    fluconazole 40 mg/ml  400 mg Per NG tube Daily    ganciclovir (CYTOVENE) IVPB  5 mg/kg Intravenous Q12H    levalbuterol  1.25 mg Nebulization Q8H    [START ON 8/24/2017] methylPREDNISolone (SOLU-Medrol) IVPB (doses > 250 mg)   Intravenous Daily    Followed by    [START ON 8/25/2017] methylPREDNISolone (SOLU-Medrol) IVPB (doses > 250 mg)   Intravenous Daily    Followed by    [START ON 8/26/2017] predniSONE  100 mg Oral Daily    Followed by    [START ON 8/27/2017] predniSONE  70 mg Oral Daily    Followed by    [START ON 8/28/2017] predniSONE  50 mg Oral Daily    Followed by    [START ON 8/29/2017] predniSONE  40 mg Oral Daily    mycophenolate mofetil  500 mg Per NG tube Q12H    sulfamethoxazole-trimethoprim 200-40 mg/5 ml  20 mL Per NG tube Every Mon, Wed, Fri    tacrolimus  0.5 mg Per NG tube BID    vancomycin (VANCOCIN) IVPB  15 mg/kg Intravenous Q12H     PRN Meds:acetaminophen, dextrose 50%, dextrose 50%, HYDROmorphone, magnesium  sulfate IVPB **AND** magnesium sulfate IVPB, ondansetron, oxycodone-acetaminophen, potassium chloride 10% **AND** potassium chloride 10% **AND** potassium chloride 10%, promethazine (PHENERGAN) IVPB, tramadol    Review of patient's allergies indicates:  No Known Allergies    Review of Systems   Constitutional: Negative for appetite change, chills, fatigue, fever and unexpected weight change.   HENT: Negative for congestion, ear pain, hearing loss, mouth sores and postnasal drip.    Eyes: Negative for photophobia, pain, discharge, redness, itching and visual disturbance.   Respiratory: Positive for cough. Negative for chest tightness and shortness of breath.    Cardiovascular: Positive for chest pain (at incision sites). Negative for palpitations and leg swelling.   Gastrointestinal: Negative for abdominal distention, abdominal pain, blood in stool, constipation and diarrhea.   Endocrine: Negative for cold intolerance, heat intolerance, polydipsia, polyphagia and polyuria.   Genitourinary: Negative for decreased urine volume, difficulty urinating, dysuria, frequency, hematuria and urgency.   Musculoskeletal: Negative for arthralgias and joint swelling.   Allergic/Immunologic: Positive for immunocompromised state. Negative for environmental allergies and food allergies.   Neurological: Positive for weakness. Negative for dizziness, tremors, syncope, speech difficulty and numbness.   Hematological: Negative for adenopathy. Does not bruise/bleed easily.   Psychiatric/Behavioral: Negative for agitation, confusion and hallucinations.     Objective:   Physical Exam   Constitutional: He is oriented to person, place, and time. He appears well-developed and well-nourished. No distress.   HENT:   Head: Normocephalic and atraumatic.   Nose: Nose normal.   Mouth/Throat: Oropharynx is clear and moist. No oropharyngeal exudate.   Eyes: Conjunctivae and EOM are normal. Pupils are equal, round, and reactive to light. No scleral  icterus.   Neck: Normal range of motion. Neck supple. No JVD present. No tracheal deviation present. No thyromegaly present.   Cardiovascular: Normal rate and normal heart sounds.  A regularly irregular rhythm present.   Pulmonary/Chest: Effort normal and breath sounds normal. No respiratory distress. He has no wheezes. He has no rales. He exhibits no tenderness.   Chest tubes in place with serosanguinous output and no air leaks.   Abdominal: Soft. Bowel sounds are normal. He exhibits no distension. There is no tenderness.   Musculoskeletal: Normal range of motion. He exhibits no edema or tenderness.   Neurological: He is alert and oriented to person, place, and time. No cranial nerve deficit.   Skin: Skin is warm and dry. He is not diaphoretic.   Psychiatric: He has a normal mood and affect. Thought content normal.         Vital Signs (Most Recent):  Temp: 99.9 °F (37.7 °C) (08/23/17 1115)  Pulse: 73 (08/23/17 1304)  Resp: 20 (08/23/17 0726)  BP: (!) 86/48 (08/23/17 1304)  SpO2: 98 % (08/23/17 1304) Vital Signs (24h Range):  Temp:  [98.1 °F (36.7 °C)-100 °F (37.8 °C)] 99.9 °F (37.7 °C)  Pulse:  [] 73  Resp:  [2-28] 20  SpO2:  [95 %-100 %] 98 %  BP: ()/(48-79) 86/48  Arterial Line BP: ()/(46-74) 67/57     Weight: 101 kg (222 lb 10.6 oz)  Body mass index is 31.95 kg/m².      Intake/Output Summary (Last 24 hours) at 08/23/17 1326  Last data filed at 08/23/17 1300   Gross per 24 hour   Intake           3701.7 ml   Output             3170 ml   Net            531.7 ml       Ventilator Data:     Vent Mode: Spont  Oxygen Concentration (%):  [] 40  Resp Rate Total:  [20 br/min-34 br/min] 31 br/min  Vt Set:  [0 mL] 0 mL  PEEP/CPAP:  [5 cmH20-10 cmH20] 5 cmH20  Pressure Support:  [0 cmH20-10 cmH20] 10 cmH20  Mean Airway Pressure:  [8.6 xyI33-45 cmH20] 8.6 cmH20    Hemodynamic Parameters:  CO:  [3.1 L/min-4.4 L/min] 3.6 L/min  CI:  [1.4 L/min/m2-2 L/min/m2] 1.7 L/min/m2    Lines/Drains:       Introducer  08/22/17 0113 (Active)   Specific Qualities Other (Comment) 8/23/2017 11:15 AM   Dressing Status Biopatch in place;Dry;Clean;Intact 8/23/2017 11:15 AM   Dressing Intervention Dressing changed 8/23/2017  8:00 AM   Dressing Change Due 08/30/17 8/23/2017 11:15 AM   Daily Line Review Performed 8/23/2017 11:15 AM   Number of days: 1            Percutaneous Central Line Insertion/Assessment - double lumen  08/22/17 1400 right internal jugular (Active)   Dressing dressing dry and intact;biopatch in place 8/23/2017 11:15 AM   Securement secured w/ sutures 8/23/2017 11:15 AM   Additional Site Signs no erythema;no warmth;no edema;no pain;no palpable cord;no streak formation;no drainage 8/23/2017 11:15 AM   Distal Patency/Care infusing 8/23/2017 11:15 AM   Proximal Patency/Care infusing 8/23/2017 11:15 AM   Waveform normal 8/23/2017 11:15 AM   Line Interventions line leveled/zeroed 8/23/2017 11:15 AM   Dressing Change Due 08/30/17 8/23/2017 11:15 AM   Daily Line Review Performed 8/23/2017 11:15 AM   Number of days: 0            Percutaneous Central Line Insertion/Assessment - triple lumen  08/22/17 0113 (Active)   Dressing biopatch in place;dressing dry and intact 8/23/2017 11:15 AM   Securement secured w/ sutures 8/23/2017 11:15 AM   Additional Site Signs no erythema;no warmth;no edema;no pain;no palpable cord;no streak formation;no drainage 8/23/2017 11:15 AM   Distal Patency/Care infusing 8/23/2017 11:15 AM   Medial Patency/Care infusing 8/23/2017 11:15 AM   Proximal Patency/Care infusing 8/23/2017 11:15 AM   Waveform normal 8/23/2017 11:15 AM   Line Interventions line leveled/zeroed 8/23/2017 11:15 AM   Dressing Change Due 08/29/17 8/23/2017  3:00 AM   Daily Line Review Performed 8/23/2017 11:15 AM   Number of days: 1            Peripheral IV - Single Lumen 08/21/17 2000 Right Forearm (Active)   Site Assessment Clean;Dry;Intact;No redness;No swelling 8/23/2017 11:15 AM   Line Status Saline locked 8/23/2017 11:15 AM  "  Dressing Status Clean;Dry;Intact 8/23/2017 11:15 AM   Dressing Intervention Other (Comment) 8/23/2017  3:00 AM   Dressing Change Due 08/25/17 8/22/2017  5:30 PM   Site Change Due 08/25/17 8/22/2017  2:30 PM   Reason Not Rotated Not due 8/23/2017 11:15 AM   Number of days: 1            NG/OG Tube 08/22/17 1400 [place] shena (Active)   Placement Check placement verified by distal tube length measurement;placement verified by aspirate characteristics 8/23/2017 11:15 AM   pH Aspirate Result 3 8/22/2017  2:30 PM   Distal Tube Length (cm) 53 8/22/2017  2:30 PM   Securement taped to commercial device 8/23/2017 11:15 AM   Clamp Status/Tolerance unclamped 8/23/2017 11:15 AM   Suction Setting/Drainage Method suction at;low;intermittent setting 8/23/2017 11:15 AM   Insertion Site Appearance no redness, warmth, tenderness, skin breakdown, drainage 8/23/2017 11:15 AM   Drainage Green;Bile 8/23/2017 11:15 AM   Flush/Irrigation flushed w/;water;no resistance met 8/23/2017 11:15 AM   Intake (mL) 120 mL 8/23/2017  8:31 AM   Tube Output(mL)(Include Discarded Residual) 100 mL 8/23/2017  1:00 PM   Number of days: 0            Urethral Catheter 08/22/17 0521 Temperature probe;Non-latex 16 Fr. (Active)   Site Assessment Clean;Intact 8/23/2017 11:15 AM   Collection Container Urimeter 8/23/2017 11:15 AM   Securement Method secured to top of thigh w/ adhesive device 8/23/2017 11:15 AM   Catheter Care Performed yes 8/23/2017 11:15 AM   Reason for Continuing Urinary Catheterization Critically ill in ICU requiring intensive monitoring;Post operative 8/23/2017 11:15 AM   CAUTI Prevention Bundle StatLock in place w 1" slack;Intact seal between catheter & drainage tubing;Drainage bag off the floor;Green sheeting clip in use;No dependent loops or kinks;Drainage bag not overfilled (<2/3 full);Drainage bag below bladder 8/23/2017  7:15 AM   Output (mL) 45 mL 8/23/2017  1:00 PM   Number of days: 1            Y Chest Tube 1 and 2 08/22/17 1242 Right " Pleural;Mediastinal 19 Fr. Left Pleural;Mediastinal 19 Fr. (Active)   Function -20 cm H2O 8/23/2017 11:15 AM   Air Leak/Fluctuation air leak not present;fluctuation not present 8/23/2017 11:15 AM   Safety all tubing connections taped;2 rubber-tipped hemostats w/ patient;all connections secured;suction checked 8/23/2017 11:15 AM   Securement tubing anchored to body distal to insertion site w/ tape 8/23/2017 11:15 AM   Left Subcutaneous Emphysema none present 8/23/2017 11:15 AM   Right Subcutaneous Emphysema none present 8/23/2017 11:15 AM   Patency Intervention Tip/tilt 8/23/2017 11:15 AM   Drainage Description 1 Sanguineous 8/23/2017 11:15 AM   Tube 1 Dressing Appearance occlusive gauze dressing intact 8/23/2017 11:15 AM   Tube 1 Dressing Care other (see comments) 8/23/2017  3:00 AM   Site Assessment 1 Not assessed;Other (Comment) 8/23/2017 11:15 AM   Surrounding Skin 1 Unable to view 8/23/2017 11:15 AM   Drainage Description 2 Sanguineous 8/23/2017 11:15 AM   Tube 2 Dressing Appearance occlusive gauze dressing intact 8/23/2017 11:15 AM   Site Assessment 2 Not assessed 8/23/2017 11:15 AM   Surrounding Skin Unable to view 8/23/2017 11:15 AM   Output (mL) 40 mL 8/23/2017  1:00 PM   Number of days: 1            Y Chest Tube 3 and 4 08/22/17 1243 Left Pleural 19 Fr. Right Pleural 19 Fr. (Active)   Function -20 cm H2O 8/23/2017 11:15 AM   Air Leak/Fluctuation air leak not present 8/23/2017 11:15 AM   Safety all tubing connections taped;2 rubber-tipped hemostats w/ patient;all connections secured;suction checked 8/23/2017 11:15 AM   Securement tubing anchored to body distal to insertion site w/ tape 8/23/2017 11:15 AM   Left Subcutaneous Emphysema none present 8/23/2017 11:15 AM   Right Subcutaneous Emphysema none present 8/23/2017 11:15 AM   Patency Intervention Tip/tilt 8/23/2017 11:15 AM   Drainage Description 3 Sanguineous 8/23/2017 11:15 AM   Tube 3 Dressing Appearance occlusive gauze dressing intact 8/23/2017 11:15  AM   Tube 3 Dressing Care other (see comments) 8/23/2017  3:00 AM   Site Assessment 3 Not assessed 8/23/2017 11:15 AM   Surrounding Skin 3 Unable to view 8/23/2017 11:15 AM   Tube 4 Dressing Appearance occlusive gauze dressing intact 8/23/2017 11:15 AM   Tube 4 Dressing Care other (see comments) 8/23/2017  3:00 AM   Site Assessment 4 Not assessed 8/23/2017 11:15 AM   Surrounding Skin 4 Unable to view 8/23/2017 11:15 AM   Output (mL) 40 mL 8/23/2017  1:00 PM   Number of days: 1       Significant Labs:  CBC:    Recent Labs  Lab 08/23/17  1007   WBC 30.30*   RBC 3.55*   HGB 10.9*   HCT 32.3*      MCV 91   MCH 30.7   MCHC 33.7     BMP:    Recent Labs  Lab 08/23/17  1007      K 4.6      CO2 21*   BUN 32*   CREATININE 2.0*   CALCIUM 7.4*      Tacrolimus Levels:    Recent Labs  Lab 08/23/17  0507   TACROLIMUS <1.5*     Microbiology:  Microbiology Results (last 7 days)     Procedure Component Value Units Date/Time    Fungus culture [[id number]] Collected:  08/23/17 0957    Order Status:  Sent Specimen:  Respiratory from Bronchial Wash Updated:  08/23/17 1241    AFB Culture & Smear [[id number]] Collected:  08/23/17 0957    Order Status:  Sent Specimen:  Respiratory from Bronchial Wash Updated:  08/23/17 1241    Culture, Respiratory [[id number]] Collected:  08/23/17 0957    Order Status:  Sent Specimen:  Respiratory from Bronchial Wash Updated:  08/23/17 1240    Aerobic culture [[id number]] Collected:  08/22/17 0816    Order Status:  Completed Specimen:  Tissue from Lung, Lingula Updated:  08/23/17 1045     Aerobic Bacterial Culture --     STAPHYLOCOCCUS AUREUS  Few  Susceptibility pending      Narrative:       Bronchus of donor lung    AFB Culture & Smear [[id number]] Collected:  08/22/17 0814    Order Status:  Completed Specimen:  Tissue from Lung, Lingula Updated:  08/23/17 0927     AFB Culture & Smear Culture in progress    Narrative:       Bronchus of donor lung    Culture, Anaerobe [[id number]] Collected:   08/22/17 0814    Order Status:  Completed Specimen:  Tissue from Lung, Lingula Updated:  08/23/17 0738     Anaerobic Culture Culture in progress    Narrative:       Bronchus of donor lung    Urine culture [[id number]] Collected:  08/21/17 2027    Order Status:  Completed Specimen:  Urine from Urine, Clean Catch Updated:  08/22/17 2333     Urine Culture, Routine No growth    Gram stain [[id number]] Collected:  08/22/17 0814    Order Status:  Completed Specimen:  Tissue from Lung, Lingula Updated:  08/22/17 0948     Gram Stain Result No WBC's      No organisms seen    Narrative:       Bronchus of donor lung    Fungus culture [[id number]] Collected:  08/22/17 0815    Order Status:  Sent Specimen:  Tissue from Lung, Lingula Updated:  08/22/17 0858          I have reviewed all pertinent labs within the past 24 hours.    Diagnostic Results:  Chest X-Ray: There is cardiac megaly, moderate edema, postoperative change             Assessment/Plan:     * Lung transplanted    There is no evidence of graft dysfunction. Extubated today and now on HFNC.  Will wean oxygen as tolerated.  Had bronchoscopy this morning.  Continue CPT and bronchodilators. Continue to monitor chest tube output which is adequate.  Got lasix this morning        Immunosuppression    Solu-Medrol administered in the OR. Basiliximab in the ICU. Continue tacrolimus, mycophenolate, and corticoteroid taper         Prophylactic antibiotic    Bactrim, valganciclovir, and diflucan for opportunistic infection prophylaxis. Ciprofloxacin and vancomycin for surgical prophylaxis.         Leukocytosis    Expected after transplant. Will continue to monitor.         Hyperglycemia    Appreciate input from endocrinology        Hypotension    Continue pressors as needed and wean as tolerated. Fluid resuscitation with albumin as needed.         Atrial Fibrillation--will start amiodarone.  Rate controlled    Preventive Measures: Nutrition: Goal: advance as tolerated, Stress Ulcer:  continue prophyllaxis, DVT: continue prophyllaxis, Head of Bet: elevated    Counseling/Consultation:Dr. Wolfe discussed the patient's condition/prognosis with the family and patient.     Irwin Celestin NP  Lung Transplant  [facility]

## 2017-08-23 NOTE — ANESTHESIA POSTPROCEDURE EVALUATION
"Anesthesia Post Evaluation    Patient: Martin Hendrix Jr.    Procedure(s) Performed: Procedure(s) (LRB):  TRANSPLANT-LUNG (Bilateral)    Final Anesthesia Type: general  Patient location during evaluation: ICU  Patient participation: No - Unable to Participate, Intubation  Level of consciousness: sedated  Post-procedure vital signs: reviewed and stable  Pain management: adequate  Airway patency: patent  PONV status at discharge: No PONV  Anesthetic complications: no      Cardiovascular status: blood pressure returned to baseline  Respiratory status: ETT, intubated and ventilator  Hydration status: euvolemic  Follow-up not needed.        Visit Vitals  BP 95/61 (BP Location: Right arm, Patient Position: Lying)   Pulse 100   Temp 37.2 °C (98.9 °F) (Oral)   Resp (!) 23   Ht 5' 10" (1.778 m)   Wt 98.6 kg (217 lb 6 oz)   SpO2 95%   BMI 31.19 kg/m²       Pain/Radha Score: Pain Assessment Performed: Yes (8/22/2017  7:05 PM)  Presence of Pain: non-verbal indicators absent (8/22/2017  7:05 PM)  Pain Rating Prior to Med Admin: 10 (8/22/2017  3:00 PM)      "

## 2017-08-23 NOTE — SUBJECTIVE & OBJECTIVE
"Interval HPI:   Intubated and sedated with pressor support. BG reasonable controlled overnight. Overall, insulin needs are decreasing, still with some drip rate variability.  On high dose SM taper beginning today.     /63 (BP Location: Right arm, Patient Position: Lying)   Pulse 109   Temp 100 °F (37.8 °C)   Resp 20   Ht 5' 10" (1.778 m)   Wt 101 kg (222 lb 10.6 oz)   SpO2 98%   BMI 31.95 kg/m²     Labs Reviewed and Include      Recent Labs  Lab 08/23/17  0507   *  176*   CALCIUM 7.8*  7.8*   ALBUMIN 2.9*  2.9*   PROT 5.4*  5.4*   *  135*   K 5.7*  5.7*   CO2 21*  21*     106   BUN 28*  28*   CREATININE 1.8*  1.8*   ALKPHOS 54*  54*   ALT 32  32   *  108*   BILITOT 0.5  0.5     Lab Results   Component Value Date    WBC 31.62 (H) 08/23/2017    WBC 31.62 (H) 08/23/2017    HGB 11.9 (L) 08/23/2017    HGB 11.9 (L) 08/23/2017    HCT 35.7 (L) 08/23/2017    HCT 35.7 (L) 08/23/2017    MCV 91 08/23/2017    MCV 91 08/23/2017     08/23/2017     08/23/2017     No results for input(s): TSH, FREET4 in the last 168 hours.  Lab Results   Component Value Date    HGBA1C 6.7 (H) 04/24/2017       Nutritional status:   Body mass index is 31.95 kg/m².  Lab Results   Component Value Date    ALBUMIN 2.9 (L) 08/23/2017    ALBUMIN 2.9 (L) 08/23/2017    ALBUMIN 3.2 (L) 08/23/2017     No results found for: PREALBUMIN    Estimated Creatinine Clearance: 55.9 mL/min (based on Cr of 1.8).    Accu-Checks  Recent Labs      08/22/17   2200  08/22/17   2307  08/23/17   0003   POCTGLUCOSE  211*  197*  172*       Current Medications and/or Treatments Impacting Glycemic Control  Immunotherapy:  Immunosuppressants         Stop Route Frequency     basiliximab (SIMULECT) 20 mg in sodium chloride 0.9% 50 mL chemo infusion      -- IV Once     mycophenolate mofetil 200 mg/mL suspension 500 mg      -- PER NG TUBE Every 12 hours     tacrolimus (PROGRAF) 1 mg/mL oral syringe      -- PER NG TUBE 2 " times daily        Steroids:   Hormones     Start     Stop Route Frequency Ordered    08/29/17 0900  predniSONE tablet 40 mg      -- Oral Daily 08/23/17 0808    08/28/17 0900  predniSONE tablet 50 mg      08/29 0859 Oral Daily 08/23/17 0808    08/27/17 0900  predniSONE tablet 70 mg      08/28 0859 Oral Daily 08/23/17 0808    08/26/17 0900  predniSONE tablet 100 mg      08/27 0859 Oral Daily 08/23/17 0808    08/25/17 0900  methylPREDNISolone sodium succinate (SOLU-MEDROL) 200 mg in dextrose 5 % 100 mL IVPB      08/26 0859 IV Daily 08/23/17 0808    08/24/17 0900  methylPREDNISolone sodium succinate (SOLU-MEDROL) 300 mg in dextrose 5 % 100 mL IVPB      08/25 0859 IV Daily 08/23/17 0808    08/23/17 0915  methylPREDNISolone sodium succinate (SOLU-MEDROL) 500 mg in dextrose 5 % 100 mL IVPB      08/24 0859 IV Daily 08/23/17 0808        Pressors:    Autonomic Drugs     Start     Stop Route Frequency Ordered    08/22/17 1515  EPINEPHrine (ADRENALIN) 4 mg in sodium chloride 0.9% 250 mL infusion     Question Answer Comment   Titrate by: (in mcg/kg/min) 0.02    Titrate interval: (in minutes) 10    Titrate to maintain: (SBP or MAP or Cardiac Index) SBP    Greater than: (in mmHg) 90    Maximum dose: (in mcg/kg/min) 2        -- IV Continuous 08/22/17 1413    08/22/17 1515  norepinephrine 4 mg in dextrose 5% 250 mL infusion (premix) (titrating)     Question Answer Comment   Titrate by: (in mcg/kg/min) 0.02    Titrate interval: (in minutes) 15    Titrate to maintain: (MAP or SBP) SBP    Greater than: (in mmHg) 90    Maximum dose: (in mcg/kg/min) 3        -- IV Continuous 08/22/17 1413        Hyperglycemia/Diabetes Medications: Antihyperglycemics     Start     Stop Route Frequency Ordered    08/22/17 1415  insulin regular (Humulin R) 100 Units in sodium chloride 0.9% 100 mL infusion      -- IV Continuous 08/22/17 1410

## 2017-08-23 NOTE — PLAN OF CARE
Problem: Patient Care Overview  Goal: Plan of Care Review  Outcome: Ongoing (interventions implemented as appropriate)  Pt afebrile and free from injury throughout shift.  Pt maintained on propofol and fentanyl for sedation and pain control.  Pt remains on epi at 0.05 and levophed at 0.12 to maintain systolic > 100.  Currently pt is on insulin gtt at 3.5units/hr.  Pt currently on ventilator on A/C 40%FiO2 and 10 of PEEP with O2 sats > 97%.  Last CVP flat = 10.  Pt has been updated on plan of care at bedside, see flow sheet for details.

## 2017-08-23 NOTE — PROGRESS NOTES
Family not present during ICU am rounds with Dr. Wolfe. Pt intubated and sedated. SW to follow up with family at a later time/date.

## 2017-08-23 NOTE — ASSESSMENT & PLAN NOTE
Bg goal 140-180, Continue intensive insulin gtt protocol. Change BG monitoring to q2h.    Discharge planning is ongoing.

## 2017-08-23 NOTE — PROGRESS NOTES
Vent Settings CPAP 10/5 40%  Weaning Parameters      NIF -21          RSBI 64   AB.303  40.3  114  -6  20  98

## 2017-08-23 NOTE — PROGRESS NOTES
Admission Note:    Martin Hendrix Jr.  is a 56 y.o. male who received a BLT secondary to Sarcoidosis.  The patient received the standard lung transplant immunosuppression which includes methylprednisolone 1 gram IV in the operating room, followed by steroid taper, per protocol.  Basiliximab 20 mg was administered on arrival to the ICU.  Next dose due on POD #4.    Maintenance immunosuppression includes tacrolimus 0.5 mg BID and mycophenolate mofetil 500 mg BID, each will be titrated to goal level or maximum dose.  Opportunistic infection prophylaxis includes valganciclovir 450 mg BID, fluconazole 400 mg daily, and Sulfamethoximazole/Trimethoprim DS QMWF.   Pertinent home medications have been restarted.  Self medication administration will begin when the patient is moved to the transplant step down unit.  Medication education will begin as well.  Will follow with team.     CMV D+/R-    Prednisone Taper:  (8/29-9/5):         40 mg daily  (9/6-9/20):         30 mg daily  (9/21-11/19):     20 mg daily  (11/20-12/19):   15 mg daily  (12/20-2/18):     10 mg daily  (2/19/18):            5 mg daily

## 2017-08-23 NOTE — PROGRESS NOTES
Bronch completed at bedside, performed by Dr Wolfe. Sample sent to lab. Family notified and at bedside. Will continue to monitor.

## 2017-08-23 NOTE — ASSESSMENT & PLAN NOTE
Solu-Medrol administered in the OR. Basiliximab in the ICU. Continue tacrolimus, mycophenolate, and corticoteroid taper

## 2017-08-23 NOTE — ASSESSMENT & PLAN NOTE
There is no evidence of graft dysfunction. Extubated today and now on HFNC.  Will wean oxygen as tolerated.  Had bronchoscopy this morning.  Continue CPT and bronchodilators. Continue to monitor chest tube output which is adequate.  Got lasix this morning

## 2017-08-23 NOTE — PLAN OF CARE
Problem: Patient Care Overview  Goal: Plan of Care Review  Outcome: Ongoing (interventions implemented as appropriate)  Plan of care reviewed with patient and family. Afebrile VSS. Epi 0.05 mcg, Levo 0.09 mcg, for -140. Fentanyl 100 mcg, Insulin 11 units/hr. Accu q1. CMP, CBC, Mag, INR q4. A/C 40% 10 peep, rate 20. 250 albumin given for low Uo. Will continue to monitor.

## 2017-08-23 NOTE — SUBJECTIVE & OBJECTIVE
Subjective:     Interval History: successfully extubated today.      Continuous Infusions:   amiodarone 1 mg/min (08/23/17 1300)    amiodarone      epinephrine infusion 0.04 mcg/kg/min (08/23/17 1300)    fentanyl 25 mcg/hr (08/23/17 1300)    insulin (HUMAN R) infusion (adults) 7.3 Units/hr (08/23/17 1300)    norepinephrine bitartrate-D5W Stopped (08/23/17 1200)     Scheduled Meds:   aspirin  81 mg Per OG tube Daily    [START ON 8/26/2017] basiliximab (SIMULECT) chemo infusion  20 mg Intravenous Once    chlorhexidine  15 mL Mouth/Throat BID    ciprofloxacin  400 mg Intravenous Q12H    enoxaparin  40 mg Subcutaneous Daily    famotidine (PF)  20 mg Intravenous BID    fluconazole 40 mg/ml  400 mg Per NG tube Daily    ganciclovir (CYTOVENE) IVPB  5 mg/kg Intravenous Q12H    levalbuterol  1.25 mg Nebulization Q8H    [START ON 8/24/2017] methylPREDNISolone (SOLU-Medrol) IVPB (doses > 250 mg)   Intravenous Daily    Followed by    [START ON 8/25/2017] methylPREDNISolone (SOLU-Medrol) IVPB (doses > 250 mg)   Intravenous Daily    Followed by    [START ON 8/26/2017] predniSONE  100 mg Oral Daily    Followed by    [START ON 8/27/2017] predniSONE  70 mg Oral Daily    Followed by    [START ON 8/28/2017] predniSONE  50 mg Oral Daily    Followed by    [START ON 8/29/2017] predniSONE  40 mg Oral Daily    mycophenolate mofetil  500 mg Per NG tube Q12H    sulfamethoxazole-trimethoprim 200-40 mg/5 ml  20 mL Per NG tube Every Mon, Wed, Fri    tacrolimus  0.5 mg Per NG tube BID    vancomycin (VANCOCIN) IVPB  15 mg/kg Intravenous Q12H     PRN Meds:acetaminophen, dextrose 50%, dextrose 50%, HYDROmorphone, magnesium sulfate IVPB **AND** magnesium sulfate IVPB, ondansetron, oxycodone-acetaminophen, potassium chloride 10% **AND** potassium chloride 10% **AND** potassium chloride 10%, promethazine (PHENERGAN) IVPB, tramadol    Review of patient's allergies indicates:  No Known Allergies    Review of Systems    Constitutional: Negative for appetite change, chills, fatigue, fever and unexpected weight change.   HENT: Negative for congestion, ear pain, hearing loss, mouth sores and postnasal drip.    Eyes: Negative for photophobia, pain, discharge, redness, itching and visual disturbance.   Respiratory: Positive for cough. Negative for chest tightness and shortness of breath.    Cardiovascular: Positive for chest pain (at incision sites). Negative for palpitations and leg swelling.   Gastrointestinal: Negative for abdominal distention, abdominal pain, blood in stool, constipation and diarrhea.   Endocrine: Negative for cold intolerance, heat intolerance, polydipsia, polyphagia and polyuria.   Genitourinary: Negative for decreased urine volume, difficulty urinating, dysuria, frequency, hematuria and urgency.   Musculoskeletal: Negative for arthralgias and joint swelling.   Allergic/Immunologic: Positive for immunocompromised state. Negative for environmental allergies and food allergies.   Neurological: Positive for weakness. Negative for dizziness, tremors, syncope, speech difficulty and numbness.   Hematological: Negative for adenopathy. Does not bruise/bleed easily.   Psychiatric/Behavioral: Negative for agitation, confusion and hallucinations.     Objective:   Physical Exam   Constitutional: He is oriented to person, place, and time. He appears well-developed and well-nourished. No distress.   HENT:   Head: Normocephalic and atraumatic.   Nose: Nose normal.   Mouth/Throat: Oropharynx is clear and moist. No oropharyngeal exudate.   Eyes: Conjunctivae and EOM are normal. Pupils are equal, round, and reactive to light. No scleral icterus.   Neck: Normal range of motion. Neck supple. No JVD present. No tracheal deviation present. No thyromegaly present.   Cardiovascular: Normal rate and normal heart sounds.  A regularly irregular rhythm present.   Pulmonary/Chest: Effort normal and breath sounds normal. No respiratory  distress. He has no wheezes. He has no rales. He exhibits no tenderness.   Chest tubes in place with serosanguinous output and no air leaks.   Abdominal: Soft. Bowel sounds are normal. He exhibits no distension. There is no tenderness.   Musculoskeletal: Normal range of motion. He exhibits no edema or tenderness.   Neurological: He is alert and oriented to person, place, and time. No cranial nerve deficit.   Skin: Skin is warm and dry. He is not diaphoretic.   Psychiatric: He has a normal mood and affect. Thought content normal.         Vital Signs (Most Recent):  Temp: 99.9 °F (37.7 °C) (08/23/17 1115)  Pulse: 73 (08/23/17 1304)  Resp: 20 (08/23/17 0726)  BP: (!) 86/48 (08/23/17 1304)  SpO2: 98 % (08/23/17 1304) Vital Signs (24h Range):  Temp:  [98.1 °F (36.7 °C)-100 °F (37.8 °C)] 99.9 °F (37.7 °C)  Pulse:  [] 73  Resp:  [2-28] 20  SpO2:  [95 %-100 %] 98 %  BP: ()/(48-79) 86/48  Arterial Line BP: ()/(46-74) 67/57     Weight: 101 kg (222 lb 10.6 oz)  Body mass index is 31.95 kg/m².      Intake/Output Summary (Last 24 hours) at 08/23/17 1326  Last data filed at 08/23/17 1300   Gross per 24 hour   Intake           3701.7 ml   Output             3170 ml   Net            531.7 ml       Ventilator Data:     Vent Mode: Spont  Oxygen Concentration (%):  [] 40  Resp Rate Total:  [20 br/min-34 br/min] 31 br/min  Vt Set:  [0 mL] 0 mL  PEEP/CPAP:  [5 cmH20-10 cmH20] 5 cmH20  Pressure Support:  [0 cmH20-10 cmH20] 10 cmH20  Mean Airway Pressure:  [8.6 uaO90-84 cmH20] 8.6 cmH20    Hemodynamic Parameters:  CO:  [3.1 L/min-4.4 L/min] 3.6 L/min  CI:  [1.4 L/min/m2-2 L/min/m2] 1.7 L/min/m2    Lines/Drains:       Introducer 08/22/17 0113 (Active)   Specific Qualities Other (Comment) 8/23/2017 11:15 AM   Dressing Status Biopatch in place;Dry;Clean;Intact 8/23/2017 11:15 AM   Dressing Intervention Dressing changed 8/23/2017  8:00 AM   Dressing Change Due 08/30/17 8/23/2017 11:15 AM   Daily Line Review Performed  8/23/2017 11:15 AM   Number of days: 1            Percutaneous Central Line Insertion/Assessment - double lumen  08/22/17 1400 right internal jugular (Active)   Dressing dressing dry and intact;biopatch in place 8/23/2017 11:15 AM   Securement secured w/ sutures 8/23/2017 11:15 AM   Additional Site Signs no erythema;no warmth;no edema;no pain;no palpable cord;no streak formation;no drainage 8/23/2017 11:15 AM   Distal Patency/Care infusing 8/23/2017 11:15 AM   Proximal Patency/Care infusing 8/23/2017 11:15 AM   Waveform normal 8/23/2017 11:15 AM   Line Interventions line leveled/zeroed 8/23/2017 11:15 AM   Dressing Change Due 08/30/17 8/23/2017 11:15 AM   Daily Line Review Performed 8/23/2017 11:15 AM   Number of days: 0            Percutaneous Central Line Insertion/Assessment - triple lumen  08/22/17 0113 (Active)   Dressing biopatch in place;dressing dry and intact 8/23/2017 11:15 AM   Securement secured w/ sutures 8/23/2017 11:15 AM   Additional Site Signs no erythema;no warmth;no edema;no pain;no palpable cord;no streak formation;no drainage 8/23/2017 11:15 AM   Distal Patency/Care infusing 8/23/2017 11:15 AM   Medial Patency/Care infusing 8/23/2017 11:15 AM   Proximal Patency/Care infusing 8/23/2017 11:15 AM   Waveform normal 8/23/2017 11:15 AM   Line Interventions line leveled/zeroed 8/23/2017 11:15 AM   Dressing Change Due 08/29/17 8/23/2017  3:00 AM   Daily Line Review Performed 8/23/2017 11:15 AM   Number of days: 1            Peripheral IV - Single Lumen 08/21/17 2000 Right Forearm (Active)   Site Assessment Clean;Dry;Intact;No redness;No swelling 8/23/2017 11:15 AM   Line Status Saline locked 8/23/2017 11:15 AM   Dressing Status Clean;Dry;Intact 8/23/2017 11:15 AM   Dressing Intervention Other (Comment) 8/23/2017  3:00 AM   Dressing Change Due 08/25/17 8/22/2017  5:30 PM   Site Change Due 08/25/17 8/22/2017  2:30 PM   Reason Not Rotated Not due 8/23/2017 11:15 AM   Number of days: 1            NG/OG Tube  "08/22/17 1400 Esau chatterjee (Active)   Placement Check placement verified by distal tube length measurement;placement verified by aspirate characteristics 8/23/2017 11:15 AM   pH Aspirate Result 3 8/22/2017  2:30 PM   Distal Tube Length (cm) 53 8/22/2017  2:30 PM   Securement taped to commercial device 8/23/2017 11:15 AM   Clamp Status/Tolerance unclamped 8/23/2017 11:15 AM   Suction Setting/Drainage Method suction at;low;intermittent setting 8/23/2017 11:15 AM   Insertion Site Appearance no redness, warmth, tenderness, skin breakdown, drainage 8/23/2017 11:15 AM   Drainage Green;Bile 8/23/2017 11:15 AM   Flush/Irrigation flushed w/;water;no resistance met 8/23/2017 11:15 AM   Intake (mL) 120 mL 8/23/2017  8:31 AM   Tube Output(mL)(Include Discarded Residual) 100 mL 8/23/2017  1:00 PM   Number of days: 0            Urethral Catheter 08/22/17 0521 Temperature probe;Non-latex 16 Fr. (Active)   Site Assessment Clean;Intact 8/23/2017 11:15 AM   Collection Container Urimeter 8/23/2017 11:15 AM   Securement Method secured to top of thigh w/ adhesive device 8/23/2017 11:15 AM   Catheter Care Performed yes 8/23/2017 11:15 AM   Reason for Continuing Urinary Catheterization Critically ill in ICU requiring intensive monitoring;Post operative 8/23/2017 11:15 AM   CAUTI Prevention Bundle StatLock in place w 1" slack;Intact seal between catheter & drainage tubing;Drainage bag off the floor;Green sheeting clip in use;No dependent loops or kinks;Drainage bag not overfilled (<2/3 full);Drainage bag below bladder 8/23/2017  7:15 AM   Output (mL) 45 mL 8/23/2017  1:00 PM   Number of days: 1            Y Chest Tube 1 and 2 08/22/17 1242 Right Pleural;Mediastinal 19 Fr. Left Pleural;Mediastinal 19 Fr. (Active)   Function -20 cm H2O 8/23/2017 11:15 AM   Air Leak/Fluctuation air leak not present;fluctuation not present 8/23/2017 11:15 AM   Safety all tubing connections taped;2 rubber-tipped hemostats w/ patient;all connections " secured;suction checked 8/23/2017 11:15 AM   Securement tubing anchored to body distal to insertion site w/ tape 8/23/2017 11:15 AM   Left Subcutaneous Emphysema none present 8/23/2017 11:15 AM   Right Subcutaneous Emphysema none present 8/23/2017 11:15 AM   Patency Intervention Tip/tilt 8/23/2017 11:15 AM   Drainage Description 1 Sanguineous 8/23/2017 11:15 AM   Tube 1 Dressing Appearance occlusive gauze dressing intact 8/23/2017 11:15 AM   Tube 1 Dressing Care other (see comments) 8/23/2017  3:00 AM   Site Assessment 1 Not assessed;Other (Comment) 8/23/2017 11:15 AM   Surrounding Skin 1 Unable to view 8/23/2017 11:15 AM   Drainage Description 2 Sanguineous 8/23/2017 11:15 AM   Tube 2 Dressing Appearance occlusive gauze dressing intact 8/23/2017 11:15 AM   Site Assessment 2 Not assessed 8/23/2017 11:15 AM   Surrounding Skin Unable to view 8/23/2017 11:15 AM   Output (mL) 40 mL 8/23/2017  1:00 PM   Number of days: 1            Y Chest Tube 3 and 4 08/22/17 1243 Left Pleural 19 Fr. Right Pleural 19 Fr. (Active)   Function -20 cm H2O 8/23/2017 11:15 AM   Air Leak/Fluctuation air leak not present 8/23/2017 11:15 AM   Safety all tubing connections taped;2 rubber-tipped hemostats w/ patient;all connections secured;suction checked 8/23/2017 11:15 AM   Securement tubing anchored to body distal to insertion site w/ tape 8/23/2017 11:15 AM   Left Subcutaneous Emphysema none present 8/23/2017 11:15 AM   Right Subcutaneous Emphysema none present 8/23/2017 11:15 AM   Patency Intervention Tip/tilt 8/23/2017 11:15 AM   Drainage Description 3 Sanguineous 8/23/2017 11:15 AM   Tube 3 Dressing Appearance occlusive gauze dressing intact 8/23/2017 11:15 AM   Tube 3 Dressing Care other (see comments) 8/23/2017  3:00 AM   Site Assessment 3 Not assessed 8/23/2017 11:15 AM   Surrounding Skin 3 Unable to view 8/23/2017 11:15 AM   Tube 4 Dressing Appearance occlusive gauze dressing intact 8/23/2017 11:15 AM   Tube 4 Dressing Care other (see  comments) 8/23/2017  3:00 AM   Site Assessment 4 Not assessed 8/23/2017 11:15 AM   Surrounding Skin 4 Unable to view 8/23/2017 11:15 AM   Output (mL) 40 mL 8/23/2017  1:00 PM   Number of days: 1       Significant Labs:  CBC:    Recent Labs  Lab 08/23/17  1007   WBC 30.30*   RBC 3.55*   HGB 10.9*   HCT 32.3*      MCV 91   MCH 30.7   MCHC 33.7     BMP:    Recent Labs  Lab 08/23/17  1007      K 4.6      CO2 21*   BUN 32*   CREATININE 2.0*   CALCIUM 7.4*      Tacrolimus Levels:    Recent Labs  Lab 08/23/17  0507   TACROLIMUS <1.5*     Microbiology:  Microbiology Results (last 7 days)     Procedure Component Value Units Date/Time    Fungus culture [[id number]] Collected:  08/23/17 0957    Order Status:  Sent Specimen:  Respiratory from Bronchial Wash Updated:  08/23/17 1241    AFB Culture & Smear [[id number]] Collected:  08/23/17 0957    Order Status:  Sent Specimen:  Respiratory from Bronchial Wash Updated:  08/23/17 1241    Culture, Respiratory [[id number]] Collected:  08/23/17 0957    Order Status:  Sent Specimen:  Respiratory from Bronchial Wash Updated:  08/23/17 1240    Aerobic culture [[id number]] Collected:  08/22/17 0816    Order Status:  Completed Specimen:  Tissue from Lung, Lingula Updated:  08/23/17 1045     Aerobic Bacterial Culture --     STAPHYLOCOCCUS AUREUS  Few  Susceptibility pending      Narrative:       Bronchus of donor lung    AFB Culture & Smear [[id number]] Collected:  08/22/17 0814    Order Status:  Completed Specimen:  Tissue from Lung, Lingula Updated:  08/23/17 0927     AFB Culture & Smear Culture in progress    Narrative:       Bronchus of donor lung    Culture, Anaerobe [[id number]] Collected:  08/22/17 0814    Order Status:  Completed Specimen:  Tissue from Lung, Lingula Updated:  08/23/17 0738     Anaerobic Culture Culture in progress    Narrative:       Bronchus of donor lung    Urine culture [[id number]] Collected:  08/21/17 2027    Order Status:  Completed Specimen:  Urine  from Urine, Clean Catch Updated:  08/22/17 2333     Urine Culture, Routine No growth    Gram stain [[id number]] Collected:  08/22/17 0814    Order Status:  Completed Specimen:  Tissue from Lung, Lingula Updated:  08/22/17 0948     Gram Stain Result No WBC's      No organisms seen    Narrative:       Bronchus of donor lung    Fungus culture [[id number]] Collected:  08/22/17 0815    Order Status:  Sent Specimen:  Tissue from Lung, Lingula Updated:  08/22/17 0858          I have reviewed all pertinent labs within the past 24 hours.    Diagnostic Results:  Chest X-Ray: There is cardiac megaly, moderate edema, postoperative change

## 2017-08-23 NOTE — PROGRESS NOTES
Pt extubated to 15L high flow nasal canula. Sats 100%.  Family at bedside. Pt instructed in use of call bell and suction. Will continue to monitor.

## 2017-08-23 NOTE — BRIEF OP NOTE
[facility]  Cardiothoracic Surgery  Operative Note    SUMMARY     Date of Procedure: 8/22/2017     Procedure: Procedure(s) (LRB):  1)  TRANSPLANT-LUNG (Bilateral) with CPB  85629  2)  Backbench preparation of donor lungs for implant [id number]    Surgeon(s) and Role:     * Paulo German MD - Primary    Assisting Surgeon: None    Pre-Operative Diagnosis: Sarcoidosis [D86.9]    Post-Operative Diagnosis: Post-Op Diagnosis Codes:     * Sarcoidosis [D86.9]    Anesthesia: General        Description of the Findings of the Procedure: Bulky calcified nodes at holley. L mainstem bronchus thickened with luminal narrowing (8 mm).  R mainstem bronchus calcified and fibrotic, and friable.        Complications: None    Estimated Blood Loss (EBL): 100 mL             Specimens:   Specimen (12h ago through future)    None                  Attestation:  I was present and scrubbed for the entire procedure.

## 2017-08-23 NOTE — OP NOTE
DATE OF PROCEDURE:  08/22/2017.    ATTENDING SURGEON:  Paulo German M.D.    ASSISTANT:  Marcel Mojica MD, Cardiothoracic Resident.    PREOPERATIVE DIAGNOSES:  1.  Sarcoidosis.  2.  Pulmonary hypertension.  3.  End-stage lung disease.  4.  Home oxygen dependence.  5.  Obstructive sleep apnea.  6.  Previous left lung biopsy.    POSTOPERATIVE DIAGNOSES:  1.  Sarcoidosis.  2.  Pulmonary hypertension.  3.  End-stage lung disease.  4.  Home oxygen dependence.  5.  Obstructive sleep apnea.  6.  Previous left lung biopsy.    OPERATIONS PERFORMED:  1.  Bilateral sequential lung transplant with cardiopulmonary bypass.  2.  Standard backbench preparation of right and left lungs for implantation.    ANESTHESIA:  General endotracheal.    ESTIMATED BLOOD LOSS:  100 mL.    BRIEF HISTORY:  Mr. Hendrix is a 56-year-old gentleman who initially was diagnosed   with pulmonary sarcoidosis in early 2000s.  He initially was treated with   steroids, but unfortunately, his disease progressed.  His medical management was   advanced, but unfortunately, he had progressive decline in his functional   status.  He also developed pulmonary hypertension.  He was evaluated for lung   transplantation, and felt to be a good candidate.  He was placed on the list.    Two organs have now become available, and he now presents for bilateral   sequential lung transplantation.    DESCRIPTION OF PROCEDURE:  After obtaining informed and written consent, the   patient was brought to the Operating Room and placed on operating table in   supine position.  After induction of adequate general endotracheal anesthesia,   the patient's chest, abdomen, pelvis, and bilateral lower extremities were   prepped and draped in the usual sterile fashion.  An upper midline skin incision   was made, and a median sternotomy was performed.  A pericardial well was   created.  The patient was systemically heparinized.  Cannulation sutures were   placed in the aorta and  in the superior vena cava.  The aortic cannula was   inserted, followed by the venous.  A second venous cannula was placed through   the right atrial appendage.  An aortic root vent was placed.  The patient was   then put on cardiopulmonary bypass.  Once on bypass, the right pleural space was   entered.  There were minimal intrapleural adhesions, largely from the lower   lobe to the diaphragm.  These were taken down using the electrocautery device.    The right inferior pulmonary ligament was then taken down using the   electrocautery device.  We then addressed the hilum.  The hilum demonstrated a   tremendous amount of adipose tissue as well as multiple bulky lymph nodes, some   of which were densely calcified.  There was also a significant amount of   fibrosis present.  The hilar structures were carefully dissected.  There was a   separate middle lobe vein.  The right superior pulmonary vein was identified,   and divided using a vascular stapler, as was the middle lobe begun.  The right   inferior pulmonary vein was identified, and divided using a vascular stapler.    The right pulmonary artery was divided using a vascular stapler distal to the   origin of the pars anterior.  The pars anterior was then divided separately   using a vascular stapler.  The dense fibrotic tissue and lymph nodes surrounding   the bronchus were divided using the electrocautery device.  The bronchus was   divided using a stapler just proximal to the origin of the right upper lobe   airway.  The first stapler was unable to close on the bronchus due to its   calcification and loss of elasticity.  A second electrical stapler was obtained,   and this was used to successfully staple and divide the bronchus.  The right   lung was then passed off the field.  The right pulmonary artery was mobilized,   as were the right-sided pulmonary veins.  The bronchus was left in situ.  The   left pleural space was then entered, and again minimal to  moderate intrapleural   adhesions were encountered.  These were divided using the electrocautery device.    The left inferior pulmonary ligament was taken down using the electrocautery   device.  We divided the left inferior pulmonary vein first, using a vascular   stapler.  The left superior pulmonary vein was then divided using a vascular   stapler.  The left pulmonary artery was then divided using a vascular staple   just at the origin of the first segmental branch.  The left main stem bronchus   was then divided using a stapler.  The left lung was passed off the field.  The   left hilar structures were mobilized, including the artery and the veins.  The   bronchus was left in situ.  The organs were then brought up, and after   verification of the donor match, standard backbench preparation was performed.    The lungs had been delivered en bloc.  They were divided, and both holley were   prepared, which included mobilization of the pulmonary artery, mobilization of   the atrial cuff, and division of the bronchus approximately one cartilaginous   ring distal to the origin of the upper lobe airways.  The left lung was   implanted first.  An iced laparotomy pad was placed in the left chest.  The left   lung was placed in the left chest.  A #10 blade was then used to divide the   staple line from the cut end of recipient bronchus.  The recipient airway was   markedly thickened, and the lumen was narrowed, roughly 8 mm in diameter.  This   represented a significant size mismatch to the bronchus of the donor lung.  The   bronchial anastomosis was then performed using a running 4-0 PDS suture, with   telescoping of the recipient airway into the donor airway.  Once the anastomosis   was complete, it was examined bronchoscopically, and although the recipient   airway was narrow, the anastomosis was widely patent.  An angled DeBakey   vascular clamp was then used to occlude the left pulmonary artery.  The staple   line was  removed.  The donor left pulmonary artery was then tailored, and the   arterial anastomosis was performed using a running 4-0 Prolene suture.  The   staple lines were then removed from the left-sided stumps of the pulmonary   veins.  The left atrial anastomosis was then performed using a running 4-0   Prolene suture.  It was left untied.  With the atrial cuff incision held open,   the angled DeBakey vascular clamp was released from the left pulmonary artery.    The lung was allowed to deair and flush out the preservation solution through   the open atrial anastomosis.  Once the lung had been flushed out, the atrial   suture was tied.  Gentle ventilation of the left lung was then begun as   heart-lung machine flows were partially reduced in order to allow controlled   reperfusion of the left lung.  The right lung had undergone backbench   preparation at the same time that the left lung was prepared.  An iced   laparotomy pad was placed in the right chest, and the right lung was placed in   the right chest.  A #10 blade was then used to divide the staple line from the   stump of the bronchus after halting ventilation.  The bronchus was noted to be   calcified and quite friable.  Although not as small as the left main stem   bronchus, the right airway was also surprisingly diminutive.  The airway   anastomosis was then performed using a running 4-0 PDS suture.  We had to   further resect the recipient bronchus prior to beginning the anastomosis.  We   had placed a silk stitch around the distal most cartilaginous ring in order to   improve our exposure, as the airway itself was relatively retracted within the   mediastinum.  When placing tension on this stitch, it tore through the distal   most ring.  That distal portion of the recipient bronchus was resected, and this   resulted in a better edge for us to sew to, but obviously shortened the   bronchus and put it even further into the mediastinum.  Once the airway    anastomosis was complete, it was inspected bronchoscopically.  It was widely   patent.  An angled DeBakey vascular clamp was then placed on the right pulmonary   artery.  The staple lines were removed from the right pulmonary artery and the   pars anterior.  The donor right pulmonary artery was tailored, and the arterial   anastomosis was performed using a running 4-0 Prolene suture.  The staple lines   were removed from the right-sided pulmonary vein stumps.  The atrial cuff   anastomosis was then performed using a running 4-0 Prolene suture.  It was left   untied.  The vascular clamp was removed from the right pulmonary artery, and the   right lung was allowed to deair and flush out the preservation solution through   the open atrial cuff anastomosis.  The atrial suture was then tied.  The   patient was subsequently weaned from cardiopulmonary bypass.  The patient did   separate easily from bypass on 50% inspired oxygen.  All surgical sites were   inspected, and there was good hemostasis.  The patient was allowed to oxygenate   on his own for several minutes, until we were satisfied that the lung function   was stable.  The test dose of protamine was administered, and this was well   tolerated.  The total dose was then given.  [place] through the total dose, all   cannulas were removed.  All surgical sites were again inspected, and again there   was good hemostasis.  Drains were placed deep in the left chest, traveling   through the mediastinum and out into the apex of the left chest, through the   mediastinum and out into the apex of the right chest, and deep into the right   chest.  After again confirming adequate hemostasis, the patient's chest was   closed using eight #6 stainless steel wires to reapproximate the sternum.  The   overlying soft tissues were reapproximated using absorbable suture material.    The patient's chest was washed and dried, and a dry dressing was applied.  The   patient tolerated the  procedure well, there were no complications.  At the   conclusion of the case, sponge and instrument counts were correct.        /ls [id number] blank(s)        PP/HN  dd: 08/23/2017 14:20:05 (CDT)  td: 08/23/2017 15:36:57 (CDT)  Doc ID   #[id number]  Job ID #[id number]    CC:     [id number]

## 2017-08-23 NOTE — PROGRESS NOTES
Patient seen with Dr. Wolfe, Irwin Celestin NP, and Thony Lyles, ZackD.  Patient received a bilateral lung transplant yesterday.  Patient sedated and intubated, with Dr. Wolfe to perform a bedside bronchoscopy later today.  Bronchoscopy results and management of atrial fibrillation will determine plan for weaning sedation and progression to extubation.  No family members or caregivers were present during team rounds.  Transplant coordinator will continue to follow with the multi-disciplinary team, and remains available to assist with any patient/family care and education.

## 2017-08-24 PROBLEM — Z94.2 S/P LUNG TRANSPLANT: Status: RESOLVED | Noted: 2017-08-22 | Resolved: 2017-08-24

## 2017-08-24 PROBLEM — R49.0 DYSPHONIA: Status: ACTIVE | Noted: 2017-08-24

## 2017-08-24 PROBLEM — Z94.2 S/P LUNG TRANSPLANT: Status: ACTIVE | Noted: 2017-08-24

## 2017-08-24 LAB
ALBUMIN SERPL BCP-MCNC: 2.8 G/DL
ALP SERPL-CCNC: 52 U/L
ALT SERPL W/O P-5'-P-CCNC: 42 U/L
ANION GAP SERPL CALC-SCNC: 9 MMOL/L
ANISOCYTOSIS BLD QL SMEAR: SLIGHT
AST SERPL-CCNC: 72 U/L
BACTERIA SPEC AEROBE CULT: NORMAL
BASOPHILS # BLD AUTO: 0.02 K/UL
BASOPHILS NFR BLD: 0.1 %
BILIRUB SERPL-MCNC: 0.4 MG/DL
BUN SERPL-MCNC: 48 MG/DL
CALCIUM SERPL-MCNC: 7.9 MG/DL
CHLORIDE SERPL-SCNC: 103 MMOL/L
CO2 SERPL-SCNC: 23 MMOL/L
CREAT SERPL-MCNC: 2.2 MG/DL
DIFFERENTIAL METHOD: ABNORMAL
EOSINOPHIL # BLD AUTO: 0 K/UL
EOSINOPHIL NFR BLD: 0 %
ERYTHROCYTE [DISTWIDTH] IN BLOOD BY AUTOMATED COUNT: 13.8 %
EST. GFR  (AFRICAN AMERICAN): 37.9 ML/MIN/1.73 M^2
EST. GFR  (NON AFRICAN AMERICAN): 32.7 ML/MIN/1.73 M^2
GLUCOSE SERPL-MCNC: 216 MG/DL
HCT VFR BLD AUTO: 30.4 %
HGB BLD-MCNC: 10.7 G/DL
LYMPHOCYTES # BLD AUTO: 0.9 K/UL
LYMPHOCYTES NFR BLD: 2.3 %
MAGNESIUM SERPL-MCNC: 1.9 MG/DL
MCH RBC QN AUTO: 30.7 PG
MCHC RBC AUTO-ENTMCNC: 35.2 G/DL
MCV RBC AUTO: 87 FL
MONOCYTES # BLD AUTO: 2.7 K/UL
MONOCYTES NFR BLD: 7.1 %
NEUTROPHILS # BLD AUTO: 34.5 K/UL
NEUTROPHILS NFR BLD: 90.5 %
PLATELET # BLD AUTO: 173 K/UL
PLATELET BLD QL SMEAR: ABNORMAL
PMV BLD AUTO: 9.8 FL
POCT GLUCOSE: 104 MG/DL (ref 70–110)
POCT GLUCOSE: 136 MG/DL (ref 70–110)
POCT GLUCOSE: 139 MG/DL (ref 70–110)
POCT GLUCOSE: 151 MG/DL (ref 70–110)
POCT GLUCOSE: 152 MG/DL (ref 70–110)
POCT GLUCOSE: 155 MG/DL (ref 70–110)
POCT GLUCOSE: 166 MG/DL (ref 70–110)
POCT GLUCOSE: 175 MG/DL (ref 70–110)
POCT GLUCOSE: 190 MG/DL (ref 70–110)
POCT GLUCOSE: 203 MG/DL (ref 70–110)
POCT GLUCOSE: 212 MG/DL (ref 70–110)
POCT GLUCOSE: 223 MG/DL (ref 70–110)
POCT GLUCOSE: 236 MG/DL (ref 70–110)
POCT GLUCOSE: 244 MG/DL (ref 70–110)
POTASSIUM SERPL-SCNC: 4.8 MMOL/L
PROT SERPL-MCNC: 5.1 G/DL
RBC # BLD AUTO: 3.49 M/UL
SODIUM SERPL-SCNC: 135 MMOL/L
TACROLIMUS BLD-MCNC: <1.5 NG/ML
VANCOMYCIN SERPL-MCNC: 25.6 UG/ML
WBC # BLD AUTO: 38.3 K/UL

## 2017-08-24 PROCEDURE — 80053 COMPREHEN METABOLIC PANEL: CPT

## 2017-08-24 PROCEDURE — 80197 ASSAY OF TACROLIMUS: CPT

## 2017-08-24 PROCEDURE — 20600001 HC STEP DOWN PRIVATE ROOM

## 2017-08-24 PROCEDURE — 25000003 PHARM REV CODE 250: Performed by: NURSE PRACTITIONER

## 2017-08-24 PROCEDURE — 85025 COMPLETE CBC W/AUTO DIFF WBC: CPT

## 2017-08-24 PROCEDURE — 99233 SBSQ HOSP IP/OBS HIGH 50: CPT | Mod: ,,, | Performed by: NURSE PRACTITIONER

## 2017-08-24 PROCEDURE — 83735 ASSAY OF MAGNESIUM: CPT

## 2017-08-24 PROCEDURE — 63600175 PHARM REV CODE 636 W HCPCS: Performed by: INTERNAL MEDICINE

## 2017-08-24 PROCEDURE — 99253 IP/OBS CNSLTJ NEW/EST LOW 45: CPT | Mod: 25,,, | Performed by: OTOLARYNGOLOGY

## 2017-08-24 PROCEDURE — 27000221 HC OXYGEN, UP TO 24 HOURS

## 2017-08-24 PROCEDURE — 31575 DIAGNOSTIC LARYNGOSCOPY: CPT | Mod: ,,, | Performed by: OTOLARYNGOLOGY

## 2017-08-24 PROCEDURE — S0028 INJECTION, FAMOTIDINE, 20 MG: HCPCS | Performed by: NURSE PRACTITIONER

## 2017-08-24 PROCEDURE — 80202 ASSAY OF VANCOMYCIN: CPT

## 2017-08-24 PROCEDURE — 63600175 PHARM REV CODE 636 W HCPCS: Performed by: NURSE PRACTITIONER

## 2017-08-24 PROCEDURE — 99900035 HC TECH TIME PER 15 MIN (STAT)

## 2017-08-24 PROCEDURE — 99233 SBSQ HOSP IP/OBS HIGH 50: CPT | Mod: ,,, | Performed by: INTERNAL MEDICINE

## 2017-08-24 PROCEDURE — 25000003 PHARM REV CODE 250: Performed by: INTERNAL MEDICINE

## 2017-08-24 PROCEDURE — 25000242 PHARM REV CODE 250 ALT 637 W/ HCPCS: Performed by: NURSE PRACTITIONER

## 2017-08-24 PROCEDURE — 94668 MNPJ CHEST WALL SBSQ: CPT

## 2017-08-24 PROCEDURE — 94640 AIRWAY INHALATION TREATMENT: CPT

## 2017-08-24 RX ORDER — IBUPROFEN 200 MG
16 TABLET ORAL
Status: DISCONTINUED | OUTPATIENT
Start: 2017-08-24 | End: 2017-09-01 | Stop reason: HOSPADM

## 2017-08-24 RX ORDER — GLUCAGON 1 MG
1 KIT INJECTION
Status: DISCONTINUED | OUTPATIENT
Start: 2017-08-24 | End: 2017-09-01 | Stop reason: HOSPADM

## 2017-08-24 RX ORDER — CEFAZOLIN SODIUM 1 G/50ML
1 SOLUTION INTRAVENOUS
Status: DISCONTINUED | OUTPATIENT
Start: 2017-08-24 | End: 2017-08-28

## 2017-08-24 RX ORDER — SODIUM CHLORIDE 9 MG/ML
INJECTION, SOLUTION INTRAVENOUS CONTINUOUS
Status: ACTIVE | OUTPATIENT
Start: 2017-08-24 | End: 2017-08-24

## 2017-08-24 RX ORDER — VALGANCICLOVIR 450 MG/1
450 TABLET, FILM COATED ORAL 2 TIMES DAILY
Status: DISCONTINUED | OUTPATIENT
Start: 2017-08-25 | End: 2017-09-01 | Stop reason: HOSPADM

## 2017-08-24 RX ORDER — FAMOTIDINE 20 MG/1
20 TABLET, FILM COATED ORAL 2 TIMES DAILY
Status: DISCONTINUED | OUTPATIENT
Start: 2017-08-24 | End: 2017-09-01 | Stop reason: HOSPADM

## 2017-08-24 RX ORDER — MYCOPHENOLATE MOFETIL 250 MG/1
500 CAPSULE ORAL 2 TIMES DAILY
Status: DISCONTINUED | OUTPATIENT
Start: 2017-08-24 | End: 2017-09-01 | Stop reason: HOSPADM

## 2017-08-24 RX ORDER — INSULIN ASPART 100 [IU]/ML
0-4 INJECTION, SOLUTION INTRAVENOUS; SUBCUTANEOUS
Status: DISCONTINUED | OUTPATIENT
Start: 2017-08-24 | End: 2017-09-01 | Stop reason: HOSPADM

## 2017-08-24 RX ORDER — SULFAMETHOXAZOLE AND TRIMETHOPRIM 800; 160 MG/1; MG/1
1 TABLET ORAL
Status: DISCONTINUED | OUTPATIENT
Start: 2017-08-25 | End: 2017-09-01 | Stop reason: HOSPADM

## 2017-08-24 RX ORDER — NAPROXEN SODIUM 220 MG/1
81 TABLET, FILM COATED ORAL DAILY
Status: DISCONTINUED | OUTPATIENT
Start: 2017-08-25 | End: 2017-09-01 | Stop reason: HOSPADM

## 2017-08-24 RX ORDER — INSULIN ASPART 100 [IU]/ML
3-5 INJECTION, SOLUTION INTRAVENOUS; SUBCUTANEOUS
Status: DISCONTINUED | OUTPATIENT
Start: 2017-08-24 | End: 2017-08-25

## 2017-08-24 RX ORDER — FLUCONAZOLE 200 MG/1
400 TABLET ORAL DAILY
Status: DISCONTINUED | OUTPATIENT
Start: 2017-08-25 | End: 2017-09-01 | Stop reason: HOSPADM

## 2017-08-24 RX ORDER — IBUPROFEN 200 MG
24 TABLET ORAL
Status: DISCONTINUED | OUTPATIENT
Start: 2017-08-24 | End: 2017-09-01 | Stop reason: HOSPADM

## 2017-08-24 RX ORDER — TACROLIMUS 1 MG/1
1 CAPSULE ORAL 2 TIMES DAILY
Status: DISCONTINUED | OUTPATIENT
Start: 2017-08-24 | End: 2017-09-01 | Stop reason: HOSPADM

## 2017-08-24 RX ADMIN — EPINEPHRINE 0.06 MCG/KG/MIN: 1 INJECTION PARENTERAL at 12:08

## 2017-08-24 RX ADMIN — Medication 0.5 MG: at 07:08

## 2017-08-24 RX ADMIN — ACETAMINOPHEN 500 MG: 500 TABLET ORAL at 02:08

## 2017-08-24 RX ADMIN — INSULIN ASPART 3 UNITS: 100 INJECTION, SOLUTION INTRAVENOUS; SUBCUTANEOUS at 04:08

## 2017-08-24 RX ADMIN — SODIUM CHLORIDE 250 ML: 0.9 INJECTION, SOLUTION INTRAVENOUS at 08:08

## 2017-08-24 RX ADMIN — CEFAZOLIN SODIUM 1 G: 1 SOLUTION INTRAVENOUS at 09:08

## 2017-08-24 RX ADMIN — SODIUM CHLORIDE: 0.9 INJECTION, SOLUTION INTRAVENOUS at 08:08

## 2017-08-24 RX ADMIN — CIPROFLOXACIN 400 MG: 2 INJECTION, SOLUTION INTRAVENOUS at 03:08

## 2017-08-24 RX ADMIN — Medication 500 MG: at 07:08

## 2017-08-24 RX ADMIN — FLUCONAZOLE 400 MG: 40 POWDER, FOR SUSPENSION ORAL at 09:08

## 2017-08-24 RX ADMIN — TRAMADOL HYDROCHLORIDE 100 MG: 50 TABLET, COATED ORAL at 02:08

## 2017-08-24 RX ADMIN — CEFAZOLIN SODIUM 1 G: 1 SOLUTION INTRAVENOUS at 05:08

## 2017-08-24 RX ADMIN — DEXTROSE: 50 INJECTION, SOLUTION INTRAVENOUS at 09:08

## 2017-08-24 RX ADMIN — ASPIRIN 81 MG CHEWABLE TABLET 81 MG: 81 TABLET CHEWABLE at 09:08

## 2017-08-24 RX ADMIN — OXYCODONE HYDROCHLORIDE AND ACETAMINOPHEN 1 TABLET: 7.5; 325 TABLET ORAL at 06:08

## 2017-08-24 RX ADMIN — SODIUM CHLORIDE 493 MG: 9 INJECTION, SOLUTION INTRAVENOUS at 02:08

## 2017-08-24 RX ADMIN — TRAMADOL HYDROCHLORIDE 100 MG: 50 TABLET, COATED ORAL at 08:08

## 2017-08-24 RX ADMIN — TACROLIMUS 1 MG: 1 CAPSULE ORAL at 06:08

## 2017-08-24 RX ADMIN — LEVALBUTEROL 1.25 MG: 1.25 SOLUTION, CONCENTRATE RESPIRATORY (INHALATION) at 03:08

## 2017-08-24 RX ADMIN — ENOXAPARIN SODIUM 40 MG: 100 INJECTION SUBCUTANEOUS at 04:08

## 2017-08-24 RX ADMIN — LEVALBUTEROL 1.25 MG: 1.25 SOLUTION, CONCENTRATE RESPIRATORY (INHALATION) at 07:08

## 2017-08-24 RX ADMIN — CHLORHEXIDINE GLUCONATE 15 ML: 1.2 RINSE ORAL at 09:08

## 2017-08-24 RX ADMIN — AMIODARONE HYDROCHLORIDE 0.5 MG/MIN: 1.8 INJECTION, SOLUTION INTRAVENOUS at 08:08

## 2017-08-24 RX ADMIN — FAMOTIDINE 20 MG: 10 INJECTION, SOLUTION INTRAVENOUS at 09:08

## 2017-08-24 RX ADMIN — CIPROFLOXACIN 400 MG: 2 INJECTION, SOLUTION INTRAVENOUS at 04:08

## 2017-08-24 RX ADMIN — SODIUM CHLORIDE 2.1 UNITS/HR: 9 INJECTION, SOLUTION INTRAVENOUS at 11:08

## 2017-08-24 RX ADMIN — CHLORHEXIDINE GLUCONATE 15 ML: 1.2 RINSE ORAL at 08:08

## 2017-08-24 RX ADMIN — GANCICLOVIR SODIUM 493 MG: 500 INJECTION, POWDER, LYOPHILIZED, FOR SOLUTION INTRAVENOUS at 02:08

## 2017-08-24 RX ADMIN — MYCOPHENOLATE MOFETIL 500 MG: 250 CAPSULE ORAL at 08:08

## 2017-08-24 RX ADMIN — FAMOTIDINE 20 MG: 20 TABLET, FILM COATED ORAL at 08:08

## 2017-08-24 NOTE — PROGRESS NOTES
[facility]  Lung Transplant  Progress Note - Critical Care    Patient Name: Martin Hendrix Jr.  MRN: [id number]  Admission Date: 8/21/2017  Hospital Length of Stay: 3 days  Post-Operative Day: 2  Attending Physician: Darrick Wolfe MD  Primary Care Provider: Sukhi Dunham Jr, MD     Subjective:     Interval History: No acute events overnight.  Still on low dose pressors for hypotension.    Continuous Infusions:   sodium chloride 0.9% 75 mL/hr at 08/24/17 0819    amiodarone 0.5 mg/min (08/24/17 0800)    epinephrine infusion 0.06 mcg/kg/min (08/24/17 0700)    insulin (HUMAN R) infusion (adults) 9.3 Units/hr (08/24/17 0700)    norepinephrine bitartrate-D5W Stopped (08/23/17 1200)     Scheduled Meds:   aspirin  81 mg Per OG tube Daily    [START ON 8/26/2017] basiliximab (SIMULECT) chemo infusion  20 mg Intravenous Once    ceFAZolin 1 g/50ml Dextrose IVPB  1 g Intravenous Q8H    chlorhexidine  15 mL Mouth/Throat BID    ciprofloxacin  400 mg Intravenous Q12H    enoxaparin  40 mg Subcutaneous Daily    famotidine (PF)  20 mg Intravenous BID    fluconazole 40 mg/ml  400 mg Per NG tube Daily    ganciclovir (CYTOVENE) IVPB  5 mg/kg Intravenous Q12H    HYDROmorphone  0.5 mg Intravenous Once    levalbuterol  1.25 mg Nebulization Q8H    methylPREDNISolone (SOLU-Medrol) IVPB (doses > 250 mg)   Intravenous Daily    Followed by    [START ON 8/25/2017] methylPREDNISolone (SOLU-Medrol) IVPB (doses > 250 mg)   Intravenous Daily    Followed by    [START ON 8/26/2017] predniSONE  100 mg Oral Daily    Followed by    [START ON 8/27/2017] predniSONE  70 mg Oral Daily    Followed by    [START ON 8/28/2017] predniSONE  50 mg Oral Daily    Followed by    [START ON 8/29/2017] predniSONE  40 mg Oral Daily    mycophenolate mofetil  500 mg Per NG tube Q12H    sulfamethoxazole-trimethoprim 200-40 mg/5 ml  20 mL Per NG tube Every Mon, Wed, Fri    tacrolimus  0.5 mg Per NG tube BID     PRN  Meds:acetaminophen, dextrose 50%, dextrose 50%, HYDROmorphone, magnesium sulfate IVPB **AND** magnesium sulfate IVPB, ondansetron, oxycodone-acetaminophen, potassium chloride 10% **AND** potassium chloride 10% **AND** potassium chloride 10%, promethazine (PHENERGAN) IVPB, tramadol    Review of patient's allergies indicates:  No Known Allergies    Review of Systems   Constitutional: Negative for appetite change, chills, fatigue, fever and unexpected weight change.   HENT: Negative for congestion, ear pain, hearing loss, mouth sores and postnasal drip.    Eyes: Negative for photophobia, pain, discharge, redness, itching and visual disturbance.   Respiratory: Positive for cough. Negative for chest tightness and shortness of breath.    Cardiovascular: Positive for chest pain (at incision sites). Negative for palpitations and leg swelling.   Gastrointestinal: Negative for abdominal distention, abdominal pain, blood in stool, constipation and diarrhea.   Endocrine: Negative for cold intolerance, heat intolerance, polydipsia, polyphagia and polyuria.   Genitourinary: Negative for decreased urine volume, difficulty urinating, dysuria, frequency, hematuria and urgency.   Musculoskeletal: Negative for arthralgias and joint swelling.   Allergic/Immunologic: Positive for immunocompromised state. Negative for environmental allergies and food allergies.   Neurological: Positive for weakness. Negative for dizziness, tremors, syncope, speech difficulty and numbness.   Hematological: Negative for adenopathy. Does not bruise/bleed easily.   Psychiatric/Behavioral: Negative for agitation, confusion and hallucinations.     Objective:   Physical Exam   Constitutional: He is oriented to person, place, and time. He appears well-developed and well-nourished. No distress.   HENT:   Head: Normocephalic and atraumatic.   Nose: Nose normal.   Mouth/Throat: Oropharynx is clear and moist. No oropharyngeal exudate.   Eyes: Conjunctivae and EOM  are normal. Pupils are equal, round, and reactive to light. No scleral icterus.   Neck: Normal range of motion. Neck supple. No JVD present. No tracheal deviation present. No thyromegaly present.   Cardiovascular: Normal rate and normal heart sounds.  A regularly irregular rhythm present.   Pulmonary/Chest: Effort normal and breath sounds normal. No respiratory distress. He has no wheezes. He has no rales. He exhibits no tenderness.   Chest tubes in place with serosanguinous output and no air leaks.   Abdominal: Soft. Bowel sounds are normal. He exhibits no distension. There is no tenderness.   Musculoskeletal: Normal range of motion. He exhibits no edema or tenderness.   Neurological: He is alert and oriented to person, place, and time. No cranial nerve deficit.   Skin: Skin is warm and dry. He is not diaphoretic.   Psychiatric: He has a normal mood and affect. Thought content normal.         Vital Signs (Most Recent):  Temp: 98.7 °F (37.1 °C) (08/24/17 0715)  Pulse: 91 (08/24/17 0751)  Resp: 20 (08/24/17 0751)  BP: (!) 93/58 (08/24/17 0745)  SpO2: 100 % (08/24/17 0745) Vital Signs (24h Range):  Temp:  [97.6 °F (36.4 °C)-99.9 °F (37.7 °C)] 98.7 °F (37.1 °C)  Pulse:  [67-95] 91  Resp:  [18-39] 20  SpO2:  [97 %-100 %] 100 %  BP: ()/(44-60) 93/58     Weight: 105 kg (231 lb 7.7 oz)  Body mass index is 33.21 kg/m².      Intake/Output Summary (Last 24 hours) at 08/24/17 0932  Last data filed at 08/24/17 0641   Gross per 24 hour   Intake           1689.4 ml   Output             1560 ml   Net            129.4 ml       Ventilator Data:     Vent Mode: Spont  Oxygen Concentration (%):  [] 40  Resp Rate Total:  [20 br/min-34 br/min] 31 br/min  Vt Set:  [0 mL] 0 mL  PEEP/CPAP:  [5 cmH20] 5 cmH20  Pressure Support:  [10 cmH20] 10 cmH20  Mean Airway Pressure:  [8.6 cmH20-8.7 cmH20] 8.6 cmH20    Hemodynamic Parameters:       Lines/Drains:       Introducer 08/22/17 0113 (Active)   Specific Qualities Other (Comment)  8/24/2017  3:00 AM   Dressing Status Biopatch in place;Clean;Dry;Intact 8/24/2017  3:00 AM   Dressing Intervention Dressing changed 8/24/2017  3:00 AM   Dressing Change Due 08/30/17 8/23/2017  3:15 PM   Daily Line Review Performed 8/24/2017  3:00 AM   Number of days: 2            Percutaneous Central Line Insertion/Assessment - double lumen  08/22/17 1400 right internal jugular (Active)   Dressing dressing dry and intact 8/24/2017  3:00 AM   Securement secured w/ sutures 8/24/2017  3:00 AM   Additional Site Signs no erythema;no warmth;no edema;no pain;no palpable cord;no streak formation;no drainage 8/24/2017  3:00 AM   Distal Patency/Care infusing 8/24/2017  3:00 AM   Proximal Patency/Care infusing 8/24/2017  3:00 AM   Waveform normal 8/24/2017  3:00 AM   Line Interventions line leveled/zeroed 8/24/2017  3:00 AM   Dressing Change Due 08/30/17 8/23/2017  3:15 PM   Daily Line Review Performed 8/24/2017  3:00 AM   Number of days: 1            Percutaneous Central Line Insertion/Assessment - triple lumen  08/22/17 0113 (Active)   Dressing biopatch in place;dressing dry and intact 8/24/2017  3:00 AM   Securement secured w/ sutures 8/24/2017  3:00 AM   Additional Site Signs no warmth;no erythema;no edema;no pain;no palpable cord;no streak formation;no drainage 8/24/2017  3:00 AM   Distal Patency/Care infusing 8/24/2017  3:00 AM   Medial Patency/Care infusing 8/24/2017  3:00 AM   Proximal Patency/Care infusing 8/24/2017  3:00 AM   Waveform normal 8/24/2017  3:00 AM   Line Interventions line leveled/zeroed 8/24/2017  3:00 AM   Dressing Change Due 08/29/17 8/23/2017  3:00 AM   Daily Line Review Performed 8/24/2017  3:00 AM   Number of days: 2            Peripheral IV - Single Lumen 08/21/17 2000 Right Forearm (Active)   Site Assessment Clean;Dry;Intact;No redness;No swelling 8/24/2017  3:00 AM   Line Status Saline locked 8/24/2017  3:00 AM   Dressing Status Clean;Dry;Intact 8/24/2017  3:00 AM   Dressing Intervention Other  (Comment) 8/24/2017  3:00 AM   Dressing Change Due 08/25/17 8/22/2017  5:30 PM   Site Change Due 08/25/17 8/22/2017  2:30 PM   Reason Not Rotated Not due 8/24/2017  3:00 AM   Number of days: 2            Y Chest Tube 1 and 2 08/22/17 1242 Right Pleural;Mediastinal 19 Fr. Left Pleural;Mediastinal 19 Fr. (Active)   Function -20 cm H2O 8/23/2017  8:00 PM   Air Leak/Fluctuation air leak not present;fluctuation not present 8/23/2017  8:00 PM   Safety all tubing connections taped;2 rubber-tipped hemostats w/ patient;all connections secured;suction checked 8/23/2017  8:00 PM   Securement tubing anchored to body distal to insertion site w/ tape 8/23/2017  8:00 PM   Left Subcutaneous Emphysema none present 8/23/2017  8:00 PM   Right Subcutaneous Emphysema none present 8/23/2017  8:00 PM   Patency Intervention Tip/tilt 8/23/2017  8:00 PM   Drainage Description 1 Sanguineous 8/23/2017  8:00 PM   Tube 1 Dressing Appearance occlusive gauze dressing intact 8/23/2017  8:00 PM   Tube 1 Dressing Care dressing changed 8/23/2017  8:00 PM   Site Assessment 1 Clean;Dry;Intact;Pink 8/23/2017  8:00 PM   Surrounding Skin 1 Dry;Intact;Non reddened 8/23/2017  8:00 PM   Drainage Description 2 Sanguineous 8/23/2017  8:00 PM   Tube 2 Dressing Appearance occlusive gauze dressing intact 8/23/2017  8:00 PM   Site Assessment 2 Clean;Dry;Intact 8/23/2017  8:00 PM   Surrounding Skin Non reddened 8/23/2017  8:00 PM   Output (mL) 40 mL 8/24/2017  5:00 AM   Number of days: 1            Y Chest Tube 3 and 4 08/22/17 1243 Left Pleural 19 Fr. Right Pleural 19 Fr. (Active)   Function -20 cm H2O 8/23/2017  8:00 PM   Air Leak/Fluctuation air leak not present;fluctuation not present 8/23/2017  8:00 PM   Safety all tubing connections taped;2 rubber-tipped hemostats w/ patient;all connections secured;suction checked 8/23/2017  8:00 PM   Securement tubing anchored to body distal to insertion site w/ tape 8/23/2017  8:00 PM   Left Subcutaneous Emphysema none  present 8/23/2017  8:00 PM   Right Subcutaneous Emphysema none present 8/23/2017  8:00 PM   Patency Intervention Tip/tilt 8/23/2017  8:00 PM   Drainage Description 3 Sanguineous 8/23/2017  8:00 PM   Tube 3 Dressing Appearance occlusive gauze dressing intact;clean and dry 8/23/2017  8:00 PM   Tube 3 Dressing Care dressing changed 8/23/2017  8:00 PM   Site Assessment 3 Clean;Dry;Intact 8/23/2017  8:00 PM   Surrounding Skin 3 Unable to view 8/23/2017  8:00 PM   Tube 4 Dressing Appearance occlusive gauze dressing intact 8/23/2017  8:00 PM   Tube 4 Dressing Care dressing changed 8/23/2017  8:00 PM   Site Assessment 4 Clean;Intact;Dry 8/23/2017  8:00 PM   Surrounding Skin 4 Unable to view 8/23/2017  8:00 PM   Output (mL) 40 mL 8/24/2017  5:00 AM   Number of days: 1       Significant Labs:  CBC:    Recent Labs  Lab 08/24/17  0251   WBC 38.30*   RBC 3.49*   HGB 10.7*   HCT 30.4*      MCV 87   MCH 30.7   MCHC 35.2     BMP:    Recent Labs  Lab 08/24/17  0251   *   K 4.8      CO2 23   BUN 48*   CREATININE 2.2*   CALCIUM 7.9*      Tacrolimus Levels:    Recent Labs  Lab 08/24/17  0500   TACROLIMUS <1.5*     Microbiology:  Microbiology Results (last 7 days)     Procedure Component Value Units Date/Time    Culture, Respiratory [[id number]] Collected:  08/23/17 0957    Order Status:  Completed Specimen:  Respiratory from Bronchial Wash Updated:  08/24/17 0922     Respiratory Culture --     STAPHYLOCOCCUS AUREUS  Moderate  Susceptibility pending  Normal respiratory mary also present       Gram Stain (Respiratory) Few WBC's     Gram Stain (Respiratory) No organisms seen    Narrative:       Bronchial Wash    Culture, Anaerobe [[id number]] Collected:  08/22/17 0814    Order Status:  Completed Specimen:  Tissue from Lung, Lingula Updated:  08/24/17 0915     Anaerobic Culture Culture in progress    Narrative:       Bronchus of donor lung    Fungus culture [[id number]] Collected:  08/22/17 0815    Order Status:  Completed  Specimen:  Tissue from Lung, Lingula Updated:  08/23/17 1358     Fungus (Mycology) Culture Culture in progress    Narrative:       Bronchus of donor lung    AFB Culture & Smear [[id number]] Collected:  08/22/17 0814    Order Status:  Completed Specimen:  Tissue from Lung, Lingula Updated:  08/23/17 1354     AFB Culture & Smear Culture in progress     AFB CULTURE STAIN No acid fast bacilli seen.    Narrative:       Bronchus of donor lung    Fungus culture [[id number]] Collected:  08/23/17 0957    Order Status:  Sent Specimen:  Respiratory from Bronchial Wash Updated:  08/23/17 1241    AFB Culture & Smear [[id number]] Collected:  08/23/17 0957    Order Status:  Sent Specimen:  Respiratory from Bronchial Wash Updated:  08/23/17 1241    Aerobic culture [[id number]] Collected:  08/22/17 0816    Order Status:  Completed Specimen:  Tissue from Lung, Lingula Updated:  08/23/17 1045     Aerobic Bacterial Culture --     STAPHYLOCOCCUS AUREUS  Few  Susceptibility pending      Narrative:       Bronchus of donor lung    Urine culture [[id number]] Collected:  08/21/17 2027    Order Status:  Completed Specimen:  Urine from Urine, Clean Catch Updated:  08/22/17 2333     Urine Culture, Routine No growth    Gram stain [[id number]] Collected:  08/22/17 0814    Order Status:  Completed Specimen:  Tissue from Lung, Lingula Updated:  08/22/17 0948     Gram Stain Result No WBC's      No organisms seen    Narrative:       Bronchus of donor lung          I have reviewed all pertinent labs within the past 24 hours.    Diagnostic Results:  Chest X-Ray: Tubes and lines appropriate.  There is postoperative change, cardiomegaly, moderate edema, and no change.             Assessment/Plan:     * Lung transplanted    There is no evidence of graft dysfunction. Currently on NC.  Will wean oxygen as tolerated.  Continue CPT and bronchodilators. Continue to monitor chest tube output which is adequate.  Will transfer to TSU once off pressors.         Immunosuppression    Solu-Medrol administered in the OR. Basiliximab in the ICU. Continue tacrolimus, mycophenolate, and corticoteroid taper         Prophylactic antibiotic    Bactrim, valganciclovir, and diflucan for opportunistic infection prophylaxis. Continue ciprofloxacin and stop vancomycin.  Start Ancef for MSSA in donor.          Leukocytosis    Expected after transplant. Will continue to monitor.         Hyperglycemia    Appreciate input from endocrinology        Hypotension    Continue epi and wean as tolerated. Will give NS bolus 250mL and start 75mL per hour infusion for 10 hours.          Atrial fibrillation    Continue amiodarone. Currently in NSR            Preventive Measures: Nutrition: Goal: advance as tolerated, Stress Ulcer: continue prophyllaxis, DVT: continue prophyllaxis, Head of Bet: elevated    Counseling/Consultation: Dr. Wolfe discussed the patient's condition/prognosis with the family and patient.    Irwin Celestin NP  Lung Transplant  [facility]

## 2017-08-24 NOTE — PROGRESS NOTES
Updated Gayle SAUNDRA Celestin about pt's BP 90/52 and HR 55 after epi gtt off. NP stated to continue to monitor at this time with epi gtt remaining off.

## 2017-08-24 NOTE — PROGRESS NOTES
Dr. Wolfe at bedside. Updated on pt's status, vitals, output, and gtt. MD ordered to stop amiodarone at this time.

## 2017-08-24 NOTE — PROGRESS NOTES
Patient seen with Dr. Wolfe, Irwin Celestin NP, Thony Lyles, ZackD and Gin Singh LMSW.  Patient awake, alert, and OOB to chair, with sats = 100% on Oxygen 4 lpm via nasal cannula.  Introduced myself to the patient and his family members (his mother and his daughter), and explained my role as the inpatient lung transplant nurse coordinator.  Plan of care reviewed with the patient and his family members as follows:  1.  IV fluids to improve hydration status today.  2.  Wean IV pressors to off as tolerated based on blood pressure readings.  3.  Advance to a regular diet.  4.  Change medications to oral formulations.  5.  Transfer from ICU to the transplant stepdown unit once IV pressors weaned off and blood pressure remains within expected parameters.  The patient and his family members asked questions, which were answered to their satisfaction.  All verbalized their understanding of the information discussed.  Transplant coordinator will continue to follow with the multi-disciplinary team, and remains available to assist with any patient/family care and education.

## 2017-08-24 NOTE — ASSESSMENT & PLAN NOTE
Bactrim, valganciclovir, and diflucan for opportunistic infection prophylaxis. Continue ciprofloxacin and stop vancomycin.  Start Ancef for MSSA in donor.

## 2017-08-24 NOTE — SUBJECTIVE & OBJECTIVE
"Interval HPI:    extubated, tolerating clears, no hypoglycemia, increased insulin doses overnight, gtt off and then restarted per protocol, no hypoglycemia      BP 94/60 (BP Location: Right arm, Patient Position: Sitting)   Pulse 80   Temp 98.7 °F (37.1 °C) (Oral)   Resp (!) 38   Ht 5' 10" (1.778 m)   Wt 105 kg (231 lb 7.7 oz)   SpO2 100%   BMI 33.21 kg/m²     Labs Reviewed and Include      Recent Labs  Lab 08/24/17  0251   *   CALCIUM 7.9*   ALBUMIN 2.8*   PROT 5.1*   *   K 4.8   CO2 23      BUN 48*   CREATININE 2.2*   ALKPHOS 52*   ALT 42   AST 72*   BILITOT 0.4     Lab Results   Component Value Date    WBC 38.30 (H) 08/24/2017    HGB 10.7 (L) 08/24/2017    HCT 30.4 (L) 08/24/2017    MCV 87 08/24/2017     08/24/2017     No results for input(s): TSH, FREET4 in the last 168 hours.  Lab Results   Component Value Date    HGBA1C 6.7 (H) 04/24/2017       Nutritional status:   Body mass index is 33.21 kg/m².  Lab Results   Component Value Date    ALBUMIN 2.8 (L) 08/24/2017    ALBUMIN 2.7 (L) 08/23/2017    ALBUMIN 2.5 (L) 08/23/2017     No results found for: PREALBUMIN    Estimated Creatinine Clearance: 46.6 mL/min (based on Cr of 2.2).    Accu-Checks  Recent Labs      08/23/17   1505  08/23/17   1608  08/23/17   1712  08/23/17   1755  08/23/17   1925  08/23/17   2002  08/23/17   2100  08/23/17   2154  08/23/17   2213  08/23/17   2256   POCTGLUCOSE  199*  218*  214*  196*  203*  191*  112*  79  95  108       Current Medications and/or Treatments Impacting Glycemic Control  Immunotherapy:  Immunosuppressants         Stop Route Frequency     basiliximab (SIMULECT) 20 mg in sodium chloride 0.9% 50 mL chemo infusion      -- IV Once     mycophenolate mofetil 200 mg/mL suspension 500 mg      -- PER NG TUBE Every 12 hours     tacrolimus (PROGRAF) 1 mg/mL oral syringe      -- PER NG TUBE 2 times daily        Steroids:   Hormones     Start     Stop Route Frequency Ordered    08/29/17 0900  " predniSONE tablet 40 mg      -- Oral Daily 08/23/17 0808    08/28/17 0900  predniSONE tablet 50 mg      08/29 0859 Oral Daily 08/23/17 0808    08/27/17 0900  predniSONE tablet 70 mg      08/28 0859 Oral Daily 08/23/17 0808    08/26/17 0900  predniSONE tablet 100 mg      08/27 0859 Oral Daily 08/23/17 0808    08/25/17 0900  methylPREDNISolone sodium succinate (SOLU-MEDROL) 200 mg in dextrose 5 % 100 mL IVPB      08/26 0859 IV Daily 08/23/17 0808        Pressors:    Autonomic Drugs     Start     Stop Route Frequency Ordered    08/22/17 1515  EPINEPHrine (ADRENALIN) 4 mg in sodium chloride 0.9% 250 mL infusion     Question Answer Comment   Titrate by: (in mcg/kg/min) 0.02    Titrate interval: (in minutes) 10    Titrate to maintain: (SBP or MAP or Cardiac Index) SBP    Greater than: (in mmHg) 90    Maximum dose: (in mcg/kg/min) 2        -- IV Continuous 08/22/17 1413    08/22/17 1515  norepinephrine 4 mg in dextrose 5% 250 mL infusion (premix) (titrating)     Question Answer Comment   Titrate by: (in mcg/kg/min) 0.02    Titrate interval: (in minutes) 15    Titrate to maintain: (MAP or SBP) SBP    Greater than: (in mmHg) 90    Maximum dose: (in mcg/kg/min) 3        -- IV Continuous 08/22/17 1413        Hyperglycemia/Diabetes Medications: Antihyperglycemics     Start     Stop Route Frequency Ordered    08/22/17 1415  insulin regular (Humulin R) 100 Units in sodium chloride 0.9% 100 mL infusion      -- IV Continuous 08/22/17 1410

## 2017-08-24 NOTE — PROGRESS NOTES
"[facility]  Endocrinology  Progress Note    Admit Date: 8/21/2017     Reason for Consult: Management of  Hyperglycemia     Surgical Procedure and Date:  8/22/17 s/p bilateral lung transplant       HPI:  Mr. 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 He is now s/p bilateral lung transplant with steroid induced hyperglycemia. Endocrine consulted for bg management.       Interval HPI:    extubated, tolerating clears, no hypoglycemia, increased insulin doses overnight, gtt off and then restarted per protocol, no hypoglycemia      BP 94/60 (BP Location: Right arm, Patient Position: Sitting)   Pulse 80   Temp 98.7 °F (37.1 °C) (Oral)   Resp (!) 38   Ht 5' 10" (1.778 m)   Wt 105 kg (231 lb 7.7 oz)   SpO2 100%   BMI 33.21 kg/m²       Labs Reviewed and Include      Recent Labs  Lab 08/24/17  0251   *   CALCIUM 7.9*   ALBUMIN 2.8*   PROT 5.1*   *   K 4.8   CO2 23      BUN 48*   CREATININE 2.2*   ALKPHOS 52*   ALT 42   AST 72*   BILITOT 0.4     Lab Results   Component Value Date    WBC 38.30 (H) 08/24/2017    HGB 10.7 (L) 08/24/2017    HCT 30.4 (L) 08/24/2017    MCV 87 08/24/2017     08/24/2017     No results for input(s): TSH, FREET4 in the last 168 hours.  Lab Results   Component Value Date    HGBA1C 6.7 (H) 04/24/2017       Nutritional status:   Body mass index is 33.21 kg/m².  Lab Results   Component Value Date    ALBUMIN 2.8 (L) 08/24/2017    ALBUMIN 2.7 (L) 08/23/2017    ALBUMIN 2.5 (L) 08/23/2017     No results found for: PREALBUMIN    Estimated Creatinine Clearance: 46.6 mL/min (based on Cr of 2.2).    Accu-Checks  Recent Labs      08/23/17   1505  08/23/17   1608  08/23/17   1712  08/23/17   1755  08/23/17   1925  08/23/17   2002  08/23/17   2100  08/23/17   2154  08/23/17   2213  08/23/17   2256   POCTGLUCOSE  199*  218*  214*  196*  203*  191*  112*  79  95  108       Current Medications and/or Treatments Impacting " Glycemic Control  Immunotherapy:  Immunosuppressants         Stop Route Frequency     basiliximab (SIMULECT) 20 mg in sodium chloride 0.9% 50 mL chemo infusion      -- IV Once     mycophenolate mofetil 200 mg/mL suspension 500 mg      -- PER NG TUBE Every 12 hours     tacrolimus (PROGRAF) 1 mg/mL oral syringe      -- PER NG TUBE 2 times daily        Steroids:   Hormones     Start     Stop Route Frequency Ordered    08/29/17 0900  predniSONE tablet 40 mg      -- Oral Daily 08/23/17 0808    08/28/17 0900  predniSONE tablet 50 mg      08/29 0859 Oral Daily 08/23/17 0808    08/27/17 0900  predniSONE tablet 70 mg      08/28 0859 Oral Daily 08/23/17 0808    08/26/17 0900  predniSONE tablet 100 mg      08/27 0859 Oral Daily 08/23/17 0808    08/25/17 0900  methylPREDNISolone sodium succinate (SOLU-MEDROL) 200 mg in dextrose 5 % 100 mL IVPB      08/26 0859 IV Daily 08/23/17 0808        Pressors:    Autonomic Drugs     Start     Stop Route Frequency Ordered    08/22/17 1515  EPINEPHrine (ADRENALIN) 4 mg in sodium chloride 0.9% 250 mL infusion     Question Answer Comment   Titrate by: (in mcg/kg/min) 0.02    Titrate interval: (in minutes) 10    Titrate to maintain: (SBP or MAP or Cardiac Index) SBP    Greater than: (in mmHg) 90    Maximum dose: (in mcg/kg/min) 2        -- IV Continuous 08/22/17 1413    08/22/17 1515  norepinephrine 4 mg in dextrose 5% 250 mL infusion (premix) (titrating)     Question Answer Comment   Titrate by: (in mcg/kg/min) 0.02    Titrate interval: (in minutes) 15    Titrate to maintain: (MAP or SBP) SBP    Greater than: (in mmHg) 90    Maximum dose: (in mcg/kg/min) 3        -- IV Continuous 08/22/17 1413        Hyperglycemia/Diabetes Medications: Antihyperglycemics     Start     Stop Route Frequency Ordered    08/22/17 1415  insulin regular (Humulin R) 100 Units in sodium chloride 0.9% 100 mL infusion      -- IV Continuous 08/22/17 1410          ASSESSMENT and PLAN    Hyperglycemia    Bg goal 140-180,  Continue intensive insulin gtt protocol with BG monitoring to q2h.  - Noon bg for evaluation of insulin needs  Ready to advance diet.    Discharge planning is ongoing.          Non morbid obesity due to excess calories    May increase insulin resistance   Body mass index is 33.21 kg/m².            Immunosuppression    May increase insulin resistance.  On high dose SM taper.           Adrenal cortical steroids causing adverse effect in therapeutic use    Increasing insulin needs          * Lung transplanted    Per DEVAN Gross, GURPREET, NP  Endocrinology  [facility]

## 2017-08-24 NOTE — HPI
Mr. Hendrix is a 55 yo M with pulmonary sarcoidosis (s/p bilateral lung transplant 08/22/17) who presents with dysphonia x 2 days. Patient states his voice is weak and sounds hoarse to him. Has been taking in sips of water PO without significant issue. Denies prior issues with voice prior to surgery. ENT consulted for further evaluation.

## 2017-08-24 NOTE — ASSESSMENT & PLAN NOTE
Mr. Hendrix is a 55 yo M with pulmonary sarcoidosis (s/p bilateral lung transplant 08/22/17) who presents with dysphonia x 2 days in setting of recent extubation and surgery in vicinity of RLN's.    - Dense hypo-mobility noted to left TVC; iatrogenic post-intubation dysphonia vs. Iatrogenic surgical dysphonia  - Recommend ST evaluation with MBSS to assess swallow function and risk for aspiration  - Patient may benefit from temporary VC injection if no improvement in coming week  - If no long-term improvement; more permanent options for correction can be addressed  - No acute ENT intervention recommended at this time  - Will discuss with staff, Dr. Merino

## 2017-08-24 NOTE — SUBJECTIVE & OBJECTIVE
Medications:  Continuous Infusions:   sodium chloride 0.9% 75 mL/hr at 08/24/17 0819    amiodarone Stopped (08/24/17 1200)    epinephrine infusion Stopped (08/24/17 1200)    insulin (HUMAN R) infusion (adults) 1.7 Units/hr (08/24/17 1423)    norepinephrine bitartrate-D5W Stopped (08/23/17 1200)     Scheduled Meds:   [START ON 8/25/2017] aspirin  81 mg Oral Daily    [START ON 8/26/2017] basiliximab (SIMULECT) chemo infusion  20 mg Intravenous Once    ceFAZolin 1 g/50ml Dextrose IVPB  1 g Intravenous Q8H    chlorhexidine  15 mL Mouth/Throat BID    ciprofloxacin  400 mg Intravenous Q12H    enoxaparin  40 mg Subcutaneous Daily    famotidine  20 mg Oral BID    [START ON 8/25/2017] fluconazole  400 mg Oral Daily    ganciclovir (CYTOVENE) IVPB  5 mg/kg Intravenous Q12H    HYDROmorphone  0.5 mg Intravenous Once    insulin aspart  3-5 Units Subcutaneous TIDWM    levalbuterol  1.25 mg Nebulization Q8H    [START ON 8/25/2017] methylPREDNISolone (SOLU-Medrol) IVPB (doses > 250 mg)   Intravenous Daily    Followed by    [START ON 8/26/2017] predniSONE  100 mg Oral Daily    Followed by    [START ON 8/27/2017] predniSONE  70 mg Oral Daily    Followed by    [START ON 8/28/2017] predniSONE  50 mg Oral Daily    Followed by    [START ON 8/29/2017] predniSONE  40 mg Oral Daily    mycophenolate  500 mg Oral BID    [START ON 8/25/2017] sulfamethoxazole-trimethoprim 800-160mg  1 tablet Oral Every Mon, Wed, Fri    tacrolimus  1 mg Oral BID    [START ON 8/25/2017] valganciclovir  450 mg Oral BID     PRN Meds:acetaminophen, dextrose 50%, dextrose 50%, glucagon (human recombinant), glucose, glucose, HYDROmorphone, insulin aspart, magnesium sulfate IVPB **AND** magnesium sulfate IVPB, ondansetron, oxycodone-acetaminophen, potassium chloride 10% **AND** potassium chloride 10% **AND** potassium chloride 10%, promethazine (PHENERGAN) IVPB, tramadol     No current facility-administered medications on file prior to  encounter.      Current Outpatient Prescriptions on File Prior to Encounter   Medication Sig    ACCU-CHEK DEANNE PLUS METER Misc USE AS DIRECTED    ACCU-CHEK DEANNE PLUS TEST STRP Strp CHECK BLOOD GLUCOSE ONCE DAILY    ACCU-CHEK SOFTCLIX LANCETS Misc CHECK BLOOD GLUCOSE ONCE QD    acetaminophen (TYLENOL) 325 MG tablet Take 650 mg by mouth every 6 (six) hours as needed.    albuterol sulfate 2.5 mg/0.5 mL Nebu Inhale 2.5 mg into the lungs 3 (three) times daily as needed.    ambrisentan (LETAIRIS) 10 MG Tab Take 1 tablet (10 mg total) by mouth once daily.    aspirin (ECOTRIN) 81 MG EC tablet Take 81 mg by mouth once daily.    fluticasone-salmeterol 500-50 mcg/dose (ADVAIR DISKUS) 500-50 mcg/dose DsDv diskus inhaler Inhale 1 puff into the lungs 2 (two) times daily.    furosemide (LASIX) 40 MG tablet Take 1 tablet by mouth once daily.    methotrexate 2.5 MG Tab Take 10 mg by mouth once a week.    multivitamin (THERAGRAN) per tablet Take 1 tablet by mouth once daily.    potassium chloride SA (K-DUR,KLOR-CON) 10 MEQ tablet Take 1 tablet by mouth once daily.    PROAIR HFA 90 mcg/actuation inhaler Inhale 1 puff into the lungs every 6 (six) hours as needed.    tadalafil, PULMONARY HYPERTENSION, (ADCIRCA) 20 mg Tab Take 2 tablets (40 mg total) by mouth once daily.       Review of patient's allergies indicates:  No Known Allergies    Past Medical History:   Diagnosis Date    Atrial fibrillation 8/23/2017    On home oxygen therapy     KRISTYN on CPAP     Osteopenia     Pulmonary hypertension     Sarcoidosis     Shortness of breath      Past Surgical History:   Procedure Laterality Date    ABDOMINAL SURGERY      6 weeks old    BRONCHOSCOPY      CHEST TUBE INSERTION      CHOLECYSTECTOMY      LUNG BIOPSY       Family History     Problem Relation (Age of Onset)    Diabetes Father, Brother    Hypertension Mother    Kidney disease Sister        Social History Main Topics    Smoking status: Never Smoker     Smokeless tobacco: Never Used    Alcohol use No    Drug use: No    Sexual activity: Not on file     Review of Systems  Objective:     Vital Signs (Most Recent):  Temp: 97.6 °F (36.4 °C) (08/24/17 1100)  Pulse: (!) 53 (08/24/17 1315)  Resp: (!) 35 (08/24/17 1315)  BP: 100/62 (08/24/17 1300)  SpO2: 100 % (08/24/17 1315) Vital Signs (24h Range):  Temp:  [97.6 °F (36.4 °C)-99.1 °F (37.3 °C)] 97.6 °F (36.4 °C)  Pulse:  [53-95] 53  Resp:  [18-39] 35  SpO2:  [97 %-100 %] 100 %  BP: ()/(44-68) 100/62     Weight: 105 kg (231 lb 7.7 oz)  Body mass index is 33.21 kg/m².      Date 08/24/17 0700 - 08/25/17 0659   Shift 8517-0504 5298-0255 8421-9959 24 Hour Total   I  N  T  A  K  E   P.O. 700   700    Shift Total  (mL/kg) 700  (6.7)   700  (6.7)   O  U  T  P  U  T   Urine  (mL/kg/hr) 100   100    Chest Tube 240   240    Shift Total  (mL/kg) 340  (3.2)   340  (3.2)   Weight (kg) 105 105 105 105       Physical Exam    General: NAD; Well appearing  Neuro: AAOx3. CN II-XII intact.  Cardiovascular: normal rate  Respiratory: No labored breathing, no stridor  Voice:  Quality: breathy   Volume: low   Pitch: low   Flexibility: diminished  Head/Face: Normal inspection; Negative sinus pressure/tenderness; Salivary glands WNL.  Eyes: EOMI; PERRLA   Right Ear: Auricle WNL. EAC WNL. TM normal.      Left Ear: Auricle WNL. EAC WNL. TM normal.  Nose: NC O2 in place. normal ext appearance and palpation. Normal septum, inferior turbinates, mucosa.   OC: Lips and gingiva wnl.  Good dentition. FOM Soft.  Anterior Tongue nml size and mobility; Hard Palate wnl  OP: BOT WNL. Soft palate wnl with Midline uvula.  Tonsils 2+ bilaterally. Posterior oropharynx patent, wnl  Neck/Lymphatic: No LAD.  Trachea midline. No thyromegaly. Full ROM.  Nl.    Flexible Fiberoptic Laryngoscopy   Verbal consent obtained. Anesthesia with 4% lidocaine instilled to bilateral nares.  Nasal Cavity- normal   Nasopharynx   Adenoid tissue - normal    eustachian tube  orifices - normal   Oropharynx   Posterior pharyngeal wall - normal   Base of tongue - normal    Valleculae - normal  Hypopharynx   Pyriform sinuses - normal    Post-cricoid normal  Supraglottis   Epiglottis - normal    AE folds- normal   Arytenoids - normal   Interarytenoid space - normal   False vocal cords - normal   Glottis   True vocal cords - L TVC with dense hypo-mobility. R TVC wnl.  Subglottis: normal      Significant Labs:  ABGs:   Recent Labs  Lab 08/23/17  1233   PH 7.303*   PCO2 40.3   HCO3 20.0*   POCSATURATED 98   BE -6     CBC:   Recent Labs  Lab 08/24/17  0251   WBC 38.30*   RBC 3.49*   HGB 10.7*   HCT 30.4*      MCV 87   MCH 30.7   MCHC 35.2     CMP:   Recent Labs  Lab 08/24/17  0251   *   CALCIUM 7.9*   ALBUMIN 2.8*   PROT 5.1*   *   K 4.8   CO2 23      BUN 48*   CREATININE 2.2*   ALKPHOS 52*   ALT 42   AST 72*   BILITOT 0.4       Significant Diagnostics:  None

## 2017-08-24 NOTE — CONSULTS
[facility]  Otorhinolaryngology-Head & Neck Surgery  Consult Note    Patient Name: Martin Hendrix Jr.  MRN: [id number]  Code Status: Full Code  Admission Date: 8/21/2017  Hospital Length of Stay: 3 days  Attending Physician: Gay Cui MD  Primary Care Provider: Sukhi Dunham Jr, MD    Patient information was obtained from patient and past medical records.     Inpatient consult to ENT  Consult performed by: JOSHUA BRO  Consult ordered by: GAY CUI  Reason for consult: dysphonia        Subjective:     Chief Complaint/Reason for Admission: Dysphonia    History of Present Illness: Mr. Hendrix is a 57 yo M with pulmonary sarcoidosis (s/p bilateral lung transplant 08/22/17) who presents with dysphonia x 2 days. Patient states his voice is weak and sounds hoarse to him. Has been taking in sips of water PO without significant issue. Denies prior issues with voice prior to surgery. ENT consulted for further evaluation.    Medications:  Continuous Infusions:   sodium chloride 0.9% 75 mL/hr at 08/24/17 0819    amiodarone Stopped (08/24/17 1200)    epinephrine infusion Stopped (08/24/17 1200)    insulin (HUMAN R) infusion (adults) 1.7 Units/hr (08/24/17 1423)    norepinephrine bitartrate-D5W Stopped (08/23/17 1200)     Scheduled Meds:   [START ON 8/25/2017] aspirin  81 mg Oral Daily    [START ON 8/26/2017] basiliximab (SIMULECT) chemo infusion  20 mg Intravenous Once    ceFAZolin 1 g/50ml Dextrose IVPB  1 g Intravenous Q8H    chlorhexidine  15 mL Mouth/Throat BID    ciprofloxacin  400 mg Intravenous Q12H    enoxaparin  40 mg Subcutaneous Daily    famotidine  20 mg Oral BID    [START ON 8/25/2017] fluconazole  400 mg Oral Daily    ganciclovir (CYTOVENE) IVPB  5 mg/kg Intravenous Q12H    HYDROmorphone  0.5 mg Intravenous Once    insulin aspart  3-5 Units Subcutaneous TIDWM    levalbuterol  1.25 mg Nebulization Q8H    [START ON 8/25/2017] methylPREDNISolone (SOLU-Medrol)  IVPB (doses > 250 mg)   Intravenous Daily    Followed by    [START ON 8/26/2017] predniSONE  100 mg Oral Daily    Followed by    [START ON 8/27/2017] predniSONE  70 mg Oral Daily    Followed by    [START ON 8/28/2017] predniSONE  50 mg Oral Daily    Followed by    [START ON 8/29/2017] predniSONE  40 mg Oral Daily    mycophenolate  500 mg Oral BID    [START ON 8/25/2017] sulfamethoxazole-trimethoprim 800-160mg  1 tablet Oral Every Mon, Wed, Fri    tacrolimus  1 mg Oral BID    [START ON 8/25/2017] valganciclovir  450 mg Oral BID     PRN Meds:acetaminophen, dextrose 50%, dextrose 50%, glucagon (human recombinant), glucose, glucose, HYDROmorphone, insulin aspart, magnesium sulfate IVPB **AND** magnesium sulfate IVPB, ondansetron, oxycodone-acetaminophen, potassium chloride 10% **AND** potassium chloride 10% **AND** potassium chloride 10%, promethazine (PHENERGAN) IVPB, tramadol     No current facility-administered medications on file prior to encounter.      Current Outpatient Prescriptions on File Prior to Encounter   Medication Sig    ACCU-CHEK DEANNE PLUS METER Misc USE AS DIRECTED    ACCU-CHEK DEANNE PLUS TEST STRP Strp CHECK BLOOD GLUCOSE ONCE DAILY    ACCU-CHEK SOFTCLIX LANCETS Misc CHECK BLOOD GLUCOSE ONCE QD    acetaminophen (TYLENOL) 325 MG tablet Take 650 mg by mouth every 6 (six) hours as needed.    albuterol sulfate 2.5 mg/0.5 mL Nebu Inhale 2.5 mg into the lungs 3 (three) times daily as needed.    ambrisentan (LETAIRIS) 10 MG Tab Take 1 tablet (10 mg total) by mouth once daily.    aspirin (ECOTRIN) 81 MG EC tablet Take 81 mg by mouth once daily.    fluticasone-salmeterol 500-50 mcg/dose (ADVAIR DISKUS) 500-50 mcg/dose DsDv diskus inhaler Inhale 1 puff into the lungs 2 (two) times daily.    furosemide (LASIX) 40 MG tablet Take 1 tablet by mouth once daily.    methotrexate 2.5 MG Tab Take 10 mg by mouth once a week.    multivitamin (THERAGRAN) per tablet Take 1 tablet by mouth once daily.     potassium chloride SA (K-DUR,KLOR-CON) 10 MEQ tablet Take 1 tablet by mouth once daily.    PROAIR HFA 90 mcg/actuation inhaler Inhale 1 puff into the lungs every 6 (six) hours as needed.    tadalafil, PULMONARY HYPERTENSION, (ADCIRCA) 20 mg Tab Take 2 tablets (40 mg total) by mouth once daily.       Review of patient's allergies indicates:  No Known Allergies    Past Medical History:   Diagnosis Date    Atrial fibrillation 8/23/2017    On home oxygen therapy     KRISTYN on CPAP     Osteopenia     Pulmonary hypertension     Sarcoidosis     Shortness of breath      Past Surgical History:   Procedure Laterality Date    ABDOMINAL SURGERY      6 weeks old    BRONCHOSCOPY      CHEST TUBE INSERTION      CHOLECYSTECTOMY      LUNG BIOPSY       Family History     Problem Relation (Age of Onset)    Diabetes Father, Brother    Hypertension Mother    Kidney disease Sister        Social History Main Topics    Smoking status: Never Smoker    Smokeless tobacco: Never Used    Alcohol use No    Drug use: No    Sexual activity: Not on file     Review of Systems  Objective:     Vital Signs (Most Recent):  Temp: 97.6 °F (36.4 °C) (08/24/17 1100)  Pulse: (!) 53 (08/24/17 1315)  Resp: (!) 35 (08/24/17 1315)  BP: 100/62 (08/24/17 1300)  SpO2: 100 % (08/24/17 1315) Vital Signs (24h Range):  Temp:  [97.6 °F (36.4 °C)-99.1 °F (37.3 °C)] 97.6 °F (36.4 °C)  Pulse:  [53-95] 53  Resp:  [18-39] 35  SpO2:  [97 %-100 %] 100 %  BP: ()/(44-68) 100/62     Weight: 105 kg (231 lb 7.7 oz)  Body mass index is 33.21 kg/m².      Date 08/24/17 0700 - 08/25/17 0659   Shift 9920-8840 5889-7293 5130-5701 24 Hour Total   I  N  T  A  K  E   P.O. 700   700    Shift Total  (mL/kg) 700  (6.7)   700  (6.7)   O  U  T  P  U  T   Urine  (mL/kg/hr) 100   100    Chest Tube 240   240    Shift Total  (mL/kg) 340  (3.2)   340  (3.2)   Weight (kg) 105 105 105 105       Physical Exam    General: NAD; Well appearing  Neuro: AAOx3. CN II-XII  intact.  Cardiovascular: normal rate  Respiratory: No labored breathing, no stridor  Voice:  Quality: breathy   Volume: low   Pitch: low   Flexibility: diminished  Head/Face: Normal inspection; Negative sinus pressure/tenderness; Salivary glands WNL.  Eyes: EOMI; PERRLA   Right Ear: Auricle WNL. EAC WNL. TM normal.      Left Ear: Auricle WNL. EAC WNL. TM normal.  Nose: NC O2 in place. normal ext appearance and palpation. Normal septum, inferior turbinates, mucosa.   OC: Lips and gingiva wnl.  Good dentition. FOM Soft.  Anterior Tongue nml size and mobility; Hard Palate wnl  OP: BOT WNL. Soft palate wnl with Midline uvula.  Tonsils 2+ bilaterally. Posterior oropharynx patent, wnl  Neck/Lymphatic: No LAD.  Trachea midline. No thyromegaly. Full ROM.  Nl.    Flexible Fiberoptic Laryngoscopy   Verbal consent obtained. Anesthesia with 4% lidocaine instilled to bilateral nares.  Nasal Cavity- normal   Nasopharynx   Adenoid tissue - normal    eustachian tube orifices - normal   Oropharynx   Posterior pharyngeal wall - normal   Base of tongue - normal    Valleculae - normal  Hypopharynx   Pyriform sinuses - normal    Post-cricoid normal  Supraglottis   Epiglottis - normal    AE folds- normal   Arytenoids - normal   Interarytenoid space - normal   False vocal cords - normal   Glottis   True vocal cords - L TVC with dense hypo-mobility. R TVC wnl.  Subglottis: normal      Significant Labs:  ABGs:   Recent Labs  Lab 08/23/17  1233   PH 7.303*   PCO2 40.3   HCO3 20.0*   POCSATURATED 98   BE -6     CBC:   Recent Labs  Lab 08/24/17  0251   WBC 38.30*   RBC 3.49*   HGB 10.7*   HCT 30.4*      MCV 87   MCH 30.7   MCHC 35.2     CMP:   Recent Labs  Lab 08/24/17  0251   *   CALCIUM 7.9*   ALBUMIN 2.8*   PROT 5.1*   *   K 4.8   CO2 23      BUN 48*   CREATININE 2.2*   ALKPHOS 52*   ALT 42   AST 72*   BILITOT 0.4       Significant Diagnostics:  None    Assessment/Plan:     Dysphonia    Mr. Hendrix is a 55 yo M with  pulmonary sarcoidosis (s/p bilateral lung transplant 08/22/17) who presents with dysphonia x 2 days in setting of recent extubation and surgery in vicinity of RLN's.    - Dense hypo-mobility noted to left TVC; iatrogenic post-intubation dysphonia vs. Iatrogenic surgical dysphonia  - Recommend ST evaluation with MBSS to assess swallow function and risk for aspiration due to decreased glottic closure  - Patient may benefit from temporary VC injection if no improvement in coming week  - If no long-term improvement; more permanent options for correction can be addressed  - No acute ENT intervention recommended at this time  - Patient should follow-up with Dr. Veliz (Laryngology) 1-2 weeks after discharge to reassess   - Will discuss with staff, Dr. Merino          VTE Risk Mitigation         Ordered     enoxaparin injection 40 mg  Daily     Route:  Subcutaneous        08/23/17 0758     High Risk of VTE  Once      08/23/17 0758          Thank you for your consult. I will follow-up with patient. Please contact us if you have any additional questions.    Roldan Correa MD  Otorhinolaryngology-Head & Neck Surgery  [facility]

## 2017-08-24 NOTE — PROGRESS NOTES
Admit Note     Met with patient, mother and daughter to assess needs. Patient is a 56 y.o.  male, admitted for Lung txp.      Patient admitted from home on 8/21/2017 .  At this time, patient presents as alert and oriented x 4, pleasant, good eye contact and asking and answering questions appropriately.  At this time, patients caregiver presents as alert and oriented x 4, pleasant, good eye contact, recall good, concentration/judgement good and asking and answering questions appropriately.    Household/Family Systems     Patient resides with patient's self, at [address] [place] [id number].  Support system includes pt's mother Susy, daughter Sybil, brother Williams, and sister Albina.  Patient does not have dependents that are need of being cared for.     Patients primary caregiver is Sybil Teixeira, patients daughter, phone number [phone].  Confirmed patients contact information is [phone] (home);   Telephone Information:   Mobile [phone]   .  Additional Significant Others who will Assist with Transplant:  Name: Williams Hendrix  Age: 55  City: [place] State: [place]  Relationship: brother  Does person drive? yes   ([phone])     Name: Albina Teixeira   Age: 46  City: [place] State: [place]  Relationship: Sister   Does person drive? Yes  ([phone])    During admission, patient's caregiver plans to stay in patient's room.  Confirmed patient and patients caregivers do have access to reliable transportation.    Cognitive Status/Learning     Patient reports reading ability as 12th grade and states patient does have difficulty with seeing and and wears glasses.  Patient reports patient learns best by Written material/Verbal explanation/Demonstration/ Hands on practice.   Needed: No.   Highest education level: High School (9-12) or GED    Vocation/Disability   .  Working for Income: No  If no, reason not working: Disability    Patient is disabled due to Sarcoidosis since  2017.  Prior to disability, patient  was employed as a .    Adherence     Patient reports a high level of adherence to patients health care regimen.  Adherence counseling and education provided. Patient verbalizes understanding.    Substance Use    Patient reports the following substance usage.    Tobacco: none, patient denies any use.  Alcohol: none, patient denies any use.  Illicit Drugs/Non-prescribed Medications: none, patient denies any use.  Patient states clear understanding of the potential impact of substance use.  Substance abstinence/cessation counseling, education and resources provided and reviewed.     Services Utilizing/ADLS    Infusion Service: Prior to admission, patient utilizing? no  Home Health: Prior to admission, patient utilizing? no  DME: Prior to admission, yes O2 and supplies through [facility]  Pulmonary/Cardiac Rehab: Prior to admission, no  Dialysis:  Prior to admission, no  Transplant Specialty Pharmacy:  Prior to admission, no.    Prior to admission, patient reports patient was independent with ADLS and was driving.  Patient reports patient is not able to care for self at this time due to compromised medical condition (as documented in medical record) and physical weakness..  Patient indicates a willingness to care for self once medically cleared to do so.    Insurance/Medications    Insured by   Payor/Plan Subscr  Sex Relation Sub. Ins. ID Effective Group Num   1. AET[employer] - AETNA* JAYE BORDEN* 1960 Male  [id number]                                   [address]   2. AET[employer] - AETNA* JAYE BORDEN* 1960 Male  [id number] 17 [id number]                                   [address]      Primary Insurance (for UNOS reporting): Private Insurance  Secondary Insurance (for UNOS reporting): None    Patient reports patient is able to obtain and afford medications at this time and at time of discharge.    Living  Will/Healthcare Power of     Patient states patient does not have a LW and/or HCPA.   provided education regarding LW and HCPA and the completion of forms.    Coping/Mental Health    Patient is coping adequately with the aid of  family members.  Patient denies mental health difficulties.     Discharge Planning    At time of discharge, patient plans to discharge to [facility] or alternate housing of his choosing under the care of daughter.  Patients daughter will transport patient.  Per rounds today, expected discharge date has not been medically determined at this time. Patient and patients caregiver  verbalize understanding and are involved in treatment planning and discharge process.    Additional Concerns     providing ongoing psychosocial support, education, resources and d/c planning as needed.  SW remains available. Patient verbalizes understanding and agreement with information reviewed, social work availability, and how to access available resources as needed.

## 2017-08-24 NOTE — ASSESSMENT & PLAN NOTE
Continue epi and wean as tolerated. Will give NS bolus 250mL and start 75mL per hour infusion for 10 hours.

## 2017-08-24 NOTE — ASSESSMENT & PLAN NOTE
Bg goal 140-180, Continue intensive insulin gtt protocol with BG monitoring to q2h.  - Noon bg for evaluation of insulin needs  Ready to advance diet.    Discharge planning is ongoing.

## 2017-08-24 NOTE — SUBJECTIVE & OBJECTIVE
Subjective:     Interval History: No acute events overnight.  Still on low dose pressors for hypotension.    Continuous Infusions:   sodium chloride 0.9% 75 mL/hr at 08/24/17 0819    amiodarone 0.5 mg/min (08/24/17 0800)    epinephrine infusion 0.06 mcg/kg/min (08/24/17 0700)    insulin (HUMAN R) infusion (adults) 9.3 Units/hr (08/24/17 0700)    norepinephrine bitartrate-D5W Stopped (08/23/17 1200)     Scheduled Meds:   aspirin  81 mg Per OG tube Daily    [START ON 8/26/2017] basiliximab (SIMULECT) chemo infusion  20 mg Intravenous Once    ceFAZolin 1 g/50ml Dextrose IVPB  1 g Intravenous Q8H    chlorhexidine  15 mL Mouth/Throat BID    ciprofloxacin  400 mg Intravenous Q12H    enoxaparin  40 mg Subcutaneous Daily    famotidine (PF)  20 mg Intravenous BID    fluconazole 40 mg/ml  400 mg Per NG tube Daily    ganciclovir (CYTOVENE) IVPB  5 mg/kg Intravenous Q12H    HYDROmorphone  0.5 mg Intravenous Once    levalbuterol  1.25 mg Nebulization Q8H    methylPREDNISolone (SOLU-Medrol) IVPB (doses > 250 mg)   Intravenous Daily    Followed by    [START ON 8/25/2017] methylPREDNISolone (SOLU-Medrol) IVPB (doses > 250 mg)   Intravenous Daily    Followed by    [START ON 8/26/2017] predniSONE  100 mg Oral Daily    Followed by    [START ON 8/27/2017] predniSONE  70 mg Oral Daily    Followed by    [START ON 8/28/2017] predniSONE  50 mg Oral Daily    Followed by    [START ON 8/29/2017] predniSONE  40 mg Oral Daily    mycophenolate mofetil  500 mg Per NG tube Q12H    sulfamethoxazole-trimethoprim 200-40 mg/5 ml  20 mL Per NG tube Every Mon, Wed, Fri    tacrolimus  0.5 mg Per NG tube BID     PRN Meds:acetaminophen, dextrose 50%, dextrose 50%, HYDROmorphone, magnesium sulfate IVPB **AND** magnesium sulfate IVPB, ondansetron, oxycodone-acetaminophen, potassium chloride 10% **AND** potassium chloride 10% **AND** potassium chloride 10%, promethazine (PHENERGAN) IVPB, tramadol    Review of patient's allergies  indicates:  No Known Allergies    Review of Systems   Constitutional: Negative for appetite change, chills, fatigue, fever and unexpected weight change.   HENT: Negative for congestion, ear pain, hearing loss, mouth sores and postnasal drip.    Eyes: Negative for photophobia, pain, discharge, redness, itching and visual disturbance.   Respiratory: Positive for cough. Negative for chest tightness and shortness of breath.    Cardiovascular: Positive for chest pain (at incision sites). Negative for palpitations and leg swelling.   Gastrointestinal: Negative for abdominal distention, abdominal pain, blood in stool, constipation and diarrhea.   Endocrine: Negative for cold intolerance, heat intolerance, polydipsia, polyphagia and polyuria.   Genitourinary: Negative for decreased urine volume, difficulty urinating, dysuria, frequency, hematuria and urgency.   Musculoskeletal: Negative for arthralgias and joint swelling.   Allergic/Immunologic: Positive for immunocompromised state. Negative for environmental allergies and food allergies.   Neurological: Positive for weakness. Negative for dizziness, tremors, syncope, speech difficulty and numbness.   Hematological: Negative for adenopathy. Does not bruise/bleed easily.   Psychiatric/Behavioral: Negative for agitation, confusion and hallucinations.     Objective:   Physical Exam   Constitutional: He is oriented to person, place, and time. He appears well-developed and well-nourished. No distress.   HENT:   Head: Normocephalic and atraumatic.   Nose: Nose normal.   Mouth/Throat: Oropharynx is clear and moist. No oropharyngeal exudate.   Eyes: Conjunctivae and EOM are normal. Pupils are equal, round, and reactive to light. No scleral icterus.   Neck: Normal range of motion. Neck supple. No JVD present. No tracheal deviation present. No thyromegaly present.   Cardiovascular: Normal rate and normal heart sounds.  A regularly irregular rhythm present.   Pulmonary/Chest: Effort  normal and breath sounds normal. No respiratory distress. He has no wheezes. He has no rales. He exhibits no tenderness.   Chest tubes in place with serosanguinous output and no air leaks.   Abdominal: Soft. Bowel sounds are normal. He exhibits no distension. There is no tenderness.   Musculoskeletal: Normal range of motion. He exhibits no edema or tenderness.   Neurological: He is alert and oriented to person, place, and time. No cranial nerve deficit.   Skin: Skin is warm and dry. He is not diaphoretic.   Psychiatric: He has a normal mood and affect. Thought content normal.         Vital Signs (Most Recent):  Temp: 98.7 °F (37.1 °C) (08/24/17 0715)  Pulse: 91 (08/24/17 0751)  Resp: 20 (08/24/17 0751)  BP: (!) 93/58 (08/24/17 0745)  SpO2: 100 % (08/24/17 0745) Vital Signs (24h Range):  Temp:  [97.6 °F (36.4 °C)-99.9 °F (37.7 °C)] 98.7 °F (37.1 °C)  Pulse:  [67-95] 91  Resp:  [18-39] 20  SpO2:  [97 %-100 %] 100 %  BP: ()/(44-60) 93/58     Weight: 105 kg (231 lb 7.7 oz)  Body mass index is 33.21 kg/m².      Intake/Output Summary (Last 24 hours) at 08/24/17 0932  Last data filed at 08/24/17 0641   Gross per 24 hour   Intake           1689.4 ml   Output             1560 ml   Net            129.4 ml       Ventilator Data:     Vent Mode: Spont  Oxygen Concentration (%):  [] 40  Resp Rate Total:  [20 br/min-34 br/min] 31 br/min  Vt Set:  [0 mL] 0 mL  PEEP/CPAP:  [5 cmH20] 5 cmH20  Pressure Support:  [10 cmH20] 10 cmH20  Mean Airway Pressure:  [8.6 cmH20-8.7 cmH20] 8.6 cmH20    Hemodynamic Parameters:       Lines/Drains:       Introducer 08/22/17 0113 (Active)   Specific Qualities Other (Comment) 8/24/2017  3:00 AM   Dressing Status Biopatch in place;Clean;Dry;Intact 8/24/2017  3:00 AM   Dressing Intervention Dressing changed 8/24/2017  3:00 AM   Dressing Change Due 08/30/17 8/23/2017  3:15 PM   Daily Line Review Performed 8/24/2017  3:00 AM   Number of days: 2            Percutaneous Central Line  Insertion/Assessment - double lumen  08/22/17 1400 right internal jugular (Active)   Dressing dressing dry and intact 8/24/2017  3:00 AM   Securement secured w/ sutures 8/24/2017  3:00 AM   Additional Site Signs no erythema;no warmth;no edema;no pain;no palpable cord;no streak formation;no drainage 8/24/2017  3:00 AM   Distal Patency/Care infusing 8/24/2017  3:00 AM   Proximal Patency/Care infusing 8/24/2017  3:00 AM   Waveform normal 8/24/2017  3:00 AM   Line Interventions line leveled/zeroed 8/24/2017  3:00 AM   Dressing Change Due 08/30/17 8/23/2017  3:15 PM   Daily Line Review Performed 8/24/2017  3:00 AM   Number of days: 1            Percutaneous Central Line Insertion/Assessment - triple lumen  08/22/17 0113 (Active)   Dressing biopatch in place;dressing dry and intact 8/24/2017  3:00 AM   Securement secured w/ sutures 8/24/2017  3:00 AM   Additional Site Signs no warmth;no erythema;no edema;no pain;no palpable cord;no streak formation;no drainage 8/24/2017  3:00 AM   Distal Patency/Care infusing 8/24/2017  3:00 AM   Medial Patency/Care infusing 8/24/2017  3:00 AM   Proximal Patency/Care infusing 8/24/2017  3:00 AM   Waveform normal 8/24/2017  3:00 AM   Line Interventions line leveled/zeroed 8/24/2017  3:00 AM   Dressing Change Due 08/29/17 8/23/2017  3:00 AM   Daily Line Review Performed 8/24/2017  3:00 AM   Number of days: 2            Peripheral IV - Single Lumen 08/21/17 2000 Right Forearm (Active)   Site Assessment Clean;Dry;Intact;No redness;No swelling 8/24/2017  3:00 AM   Line Status Saline locked 8/24/2017  3:00 AM   Dressing Status Clean;Dry;Intact 8/24/2017  3:00 AM   Dressing Intervention Other (Comment) 8/24/2017  3:00 AM   Dressing Change Due 08/25/17 8/22/2017  5:30 PM   Site Change Due 08/25/17 8/22/2017  2:30 PM   Reason Not Rotated Not due 8/24/2017  3:00 AM   Number of days: 2            Y Chest Tube 1 and 2 08/22/17 1242 Right Pleural;Mediastinal 19 Fr. Left Pleural;Mediastinal 19 Fr.  (Active)   Function -20 cm H2O 8/23/2017  8:00 PM   Air Leak/Fluctuation air leak not present;fluctuation not present 8/23/2017  8:00 PM   Safety all tubing connections taped;2 rubber-tipped hemostats w/ patient;all connections secured;suction checked 8/23/2017  8:00 PM   Securement tubing anchored to body distal to insertion site w/ tape 8/23/2017  8:00 PM   Left Subcutaneous Emphysema none present 8/23/2017  8:00 PM   Right Subcutaneous Emphysema none present 8/23/2017  8:00 PM   Patency Intervention Tip/tilt 8/23/2017  8:00 PM   Drainage Description 1 Sanguineous 8/23/2017  8:00 PM   Tube 1 Dressing Appearance occlusive gauze dressing intact 8/23/2017  8:00 PM   Tube 1 Dressing Care dressing changed 8/23/2017  8:00 PM   Site Assessment 1 Clean;Dry;Intact;Pink 8/23/2017  8:00 PM   Surrounding Skin 1 Dry;Intact;Non reddened 8/23/2017  8:00 PM   Drainage Description 2 Sanguineous 8/23/2017  8:00 PM   Tube 2 Dressing Appearance occlusive gauze dressing intact 8/23/2017  8:00 PM   Site Assessment 2 Clean;Dry;Intact 8/23/2017  8:00 PM   Surrounding Skin Non reddened 8/23/2017  8:00 PM   Output (mL) 40 mL 8/24/2017  5:00 AM   Number of days: 1            Y Chest Tube 3 and 4 08/22/17 1243 Left Pleural 19 Fr. Right Pleural 19 Fr. (Active)   Function -20 cm H2O 8/23/2017  8:00 PM   Air Leak/Fluctuation air leak not present;fluctuation not present 8/23/2017  8:00 PM   Safety all tubing connections taped;2 rubber-tipped hemostats w/ patient;all connections secured;suction checked 8/23/2017  8:00 PM   Securement tubing anchored to body distal to insertion site w/ tape 8/23/2017  8:00 PM   Left Subcutaneous Emphysema none present 8/23/2017  8:00 PM   Right Subcutaneous Emphysema none present 8/23/2017  8:00 PM   Patency Intervention Tip/tilt 8/23/2017  8:00 PM   Drainage Description 3 Sanguineous 8/23/2017  8:00 PM   Tube 3 Dressing Appearance occlusive gauze dressing intact;clean and dry 8/23/2017  8:00 PM   Tube 3 Dressing  Care dressing changed 8/23/2017  8:00 PM   Site Assessment 3 Clean;Dry;Intact 8/23/2017  8:00 PM   Surrounding Skin 3 Unable to view 8/23/2017  8:00 PM   Tube 4 Dressing Appearance occlusive gauze dressing intact 8/23/2017  8:00 PM   Tube 4 Dressing Care dressing changed 8/23/2017  8:00 PM   Site Assessment 4 Clean;Intact;Dry 8/23/2017  8:00 PM   Surrounding Skin 4 Unable to view 8/23/2017  8:00 PM   Output (mL) 40 mL 8/24/2017  5:00 AM   Number of days: 1       Significant Labs:  CBC:    Recent Labs  Lab 08/24/17 0251   WBC 38.30*   RBC 3.49*   HGB 10.7*   HCT 30.4*      MCV 87   MCH 30.7   MCHC 35.2     BMP:    Recent Labs  Lab 08/24/17 0251   *   K 4.8      CO2 23   BUN 48*   CREATININE 2.2*   CALCIUM 7.9*      Tacrolimus Levels:    Recent Labs  Lab 08/24/17  0500   TACROLIMUS <1.5*     Microbiology:  Microbiology Results (last 7 days)     Procedure Component Value Units Date/Time    Culture, Respiratory [[id number]] Collected:  08/23/17 0957    Order Status:  Completed Specimen:  Respiratory from Bronchial Wash Updated:  08/24/17 0922     Respiratory Culture --     STAPHYLOCOCCUS AUREUS  Moderate  Susceptibility pending  Normal respiratory mary also present       Gram Stain (Respiratory) Few WBC's     Gram Stain (Respiratory) No organisms seen    Narrative:       Bronchial Wash    Culture, Anaerobe [[id number]] Collected:  08/22/17 0814    Order Status:  Completed Specimen:  Tissue from Lung, Lingula Updated:  08/24/17 0915     Anaerobic Culture Culture in progress    Narrative:       Bronchus of donor lung    Fungus culture [[id number]] Collected:  08/22/17 0815    Order Status:  Completed Specimen:  Tissue from Lung, Lingula Updated:  08/23/17 1358     Fungus (Mycology) Culture Culture in progress    Narrative:       Bronchus of donor lung    AFB Culture & Smear [[id number]] Collected:  08/22/17 0814    Order Status:  Completed Specimen:  Tissue from Lung, Lingula Updated:  08/23/17 1352      AFB Culture & Smear Culture in progress     AFB CULTURE STAIN No acid fast bacilli seen.    Narrative:       Bronchus of donor lung    Fungus culture [[id number]] Collected:  08/23/17 0957    Order Status:  Sent Specimen:  Respiratory from Bronchial Wash Updated:  08/23/17 1241    AFB Culture & Smear [[id number]] Collected:  08/23/17 0957    Order Status:  Sent Specimen:  Respiratory from Bronchial Wash Updated:  08/23/17 1241    Aerobic culture [[id number]] Collected:  08/22/17 0816    Order Status:  Completed Specimen:  Tissue from Lung, Lingula Updated:  08/23/17 1045     Aerobic Bacterial Culture --     STAPHYLOCOCCUS AUREUS  Few  Susceptibility pending      Narrative:       Bronchus of donor lung    Urine culture [[id number]] Collected:  08/21/17 2027    Order Status:  Completed Specimen:  Urine from Urine, Clean Catch Updated:  08/22/17 2333     Urine Culture, Routine No growth    Gram stain [[id number]] Collected:  08/22/17 0814    Order Status:  Completed Specimen:  Tissue from Lung, Lingula Updated:  08/22/17 0948     Gram Stain Result No WBC's      No organisms seen    Narrative:       Bronchus of donor lung          I have reviewed all pertinent labs within the past 24 hours.    Diagnostic Results:  Chest X-Ray: Tubes and lines appropriate.  There is postoperative change, cardiomegaly, moderate edema, and no change.

## 2017-08-24 NOTE — PROGRESS NOTES
Notified Елена Gross NP about pt's noon  with insulin gtt currently at 2.1 Units/hr. NP ordered to decrease insulin gtt to 2 Units/hr, ordered 2000 calorie ADA diet, and accuchecks changed to AC/HS and 2 am.

## 2017-08-24 NOTE — PLAN OF CARE
Problem: Patient Care Overview  Goal: Plan of Care Review  Outcome: Ongoing (interventions implemented as appropriate)  PT afebrile and free from injury throughout shift. Pt maintained on amiodarone at 0.5 for Afib.  Pt maintained on Epi gtt at 0.06 to maintain systolic > 90.  Pt currently on 4L High flow nasal cannula with O2 sats > 99%.  Pt and daughter updated on plan of care at bedside, see flow sheet for details.

## 2017-08-24 NOTE — ASSESSMENT & PLAN NOTE
There is no evidence of graft dysfunction. Currently on NC.  Will wean oxygen as tolerated.  Continue CPT and bronchodilators. Continue to monitor chest tube output which is adequate.  Will transfer to TSU once off pressors.

## 2017-08-25 LAB
ALBUMIN SERPL BCP-MCNC: 2.6 G/DL
ALP SERPL-CCNC: 51 U/L
ALT SERPL W/O P-5'-P-CCNC: 38 U/L
ANION GAP SERPL CALC-SCNC: 9 MMOL/L
AST SERPL-CCNC: 39 U/L
BACTERIA SPEC AEROBE CULT: NORMAL
BASOPHILS # BLD AUTO: 0.01 K/UL
BASOPHILS NFR BLD: 0 %
BILIRUB SERPL-MCNC: 0.4 MG/DL
BLD PROD TYP BPU: NORMAL
BLOOD UNIT EXPIRATION DATE: NORMAL
BLOOD UNIT TYPE CODE: 9500
BLOOD UNIT TYPE: NORMAL
BUN SERPL-MCNC: 67 MG/DL
CALCIUM SERPL-MCNC: 7.9 MG/DL
CHLORIDE SERPL-SCNC: 97 MMOL/L
CO2 SERPL-SCNC: 24 MMOL/L
CODING SYSTEM: NORMAL
CREAT SERPL-MCNC: 2 MG/DL
DIFFERENTIAL METHOD: ABNORMAL
DISPENSE STATUS: NORMAL
EOSINOPHIL # BLD AUTO: 0 K/UL
EOSINOPHIL NFR BLD: 0 %
ERYTHROCYTE [DISTWIDTH] IN BLOOD BY AUTOMATED COUNT: 13.6 %
EST. GFR  (AFRICAN AMERICAN): 42.5 ML/MIN/1.73 M^2
EST. GFR  (NON AFRICAN AMERICAN): 36.7 ML/MIN/1.73 M^2
GLUCOSE SERPL-MCNC: 166 MG/DL
GRAM STN SPEC: NORMAL
GRAM STN SPEC: NORMAL
HCT VFR BLD AUTO: 28.6 %
HGB BLD-MCNC: 9.9 G/DL
LYMPHOCYTES # BLD AUTO: 0.6 K/UL
LYMPHOCYTES NFR BLD: 3 %
MAGNESIUM SERPL-MCNC: 2.1 MG/DL
MCH RBC QN AUTO: 30.8 PG
MCHC RBC AUTO-ENTMCNC: 34.6 G/DL
MCV RBC AUTO: 89 FL
MONOCYTES # BLD AUTO: 1.1 K/UL
MONOCYTES NFR BLD: 5.3 %
NEUTROPHILS # BLD AUTO: 18.5 K/UL
NEUTROPHILS NFR BLD: 91.4 %
PLATELET # BLD AUTO: 166 K/UL
PMV BLD AUTO: 9.9 FL
POCT GLUCOSE: 114 MG/DL (ref 70–110)
POCT GLUCOSE: 124 MG/DL (ref 70–110)
POCT GLUCOSE: 126 MG/DL (ref 70–110)
POCT GLUCOSE: 97 MG/DL (ref 70–110)
POTASSIUM SERPL-SCNC: 4.9 MMOL/L
PROT SERPL-MCNC: 5.2 G/DL
RBC # BLD AUTO: 3.21 M/UL
SODIUM SERPL-SCNC: 130 MMOL/L
TACROLIMUS BLD-MCNC: 2.9 NG/ML
TRANS ERYTHROCYTES VOL PATIENT: NORMAL ML
WBC # BLD AUTO: 20.22 K/UL

## 2017-08-25 PROCEDURE — 63600175 PHARM REV CODE 636 W HCPCS: Performed by: NURSE PRACTITIONER

## 2017-08-25 PROCEDURE — 99900035 HC TECH TIME PER 15 MIN (STAT)

## 2017-08-25 PROCEDURE — 83735 ASSAY OF MAGNESIUM: CPT

## 2017-08-25 PROCEDURE — G8996 SWALLOW CURRENT STATUS: HCPCS | Mod: CI

## 2017-08-25 PROCEDURE — 80197 ASSAY OF TACROLIMUS: CPT

## 2017-08-25 PROCEDURE — 85025 COMPLETE CBC W/AUTO DIFF WBC: CPT

## 2017-08-25 PROCEDURE — 25000003 PHARM REV CODE 250: Performed by: NURSE PRACTITIONER

## 2017-08-25 PROCEDURE — 92611 MOTION FLUOROSCOPY/SWALLOW: CPT

## 2017-08-25 PROCEDURE — 94640 AIRWAY INHALATION TREATMENT: CPT

## 2017-08-25 PROCEDURE — 99232 SBSQ HOSP IP/OBS MODERATE 35: CPT | Mod: ,,, | Performed by: NURSE PRACTITIONER

## 2017-08-25 PROCEDURE — 25000242 PHARM REV CODE 250 ALT 637 W/ HCPCS: Performed by: NURSE PRACTITIONER

## 2017-08-25 PROCEDURE — 97165 OT EVAL LOW COMPLEX 30 MIN: CPT

## 2017-08-25 PROCEDURE — 92610 EVALUATE SWALLOWING FUNCTION: CPT

## 2017-08-25 PROCEDURE — 25000003 PHARM REV CODE 250: Performed by: INTERNAL MEDICINE

## 2017-08-25 PROCEDURE — G8978 MOBILITY CURRENT STATUS: HCPCS | Mod: CK

## 2017-08-25 PROCEDURE — 94799 UNLISTED PULMONARY SVC/PX: CPT

## 2017-08-25 PROCEDURE — 97802 MEDICAL NUTRITION INDIV IN: CPT | Performed by: DIETITIAN, REGISTERED

## 2017-08-25 PROCEDURE — 80053 COMPREHEN METABOLIC PANEL: CPT

## 2017-08-25 PROCEDURE — 94761 N-INVAS EAR/PLS OXIMETRY MLT: CPT

## 2017-08-25 PROCEDURE — 20600001 HC STEP DOWN PRIVATE ROOM

## 2017-08-25 PROCEDURE — 94668 MNPJ CHEST WALL SBSQ: CPT

## 2017-08-25 PROCEDURE — 99232 SBSQ HOSP IP/OBS MODERATE 35: CPT | Mod: ,,, | Performed by: INTERNAL MEDICINE

## 2017-08-25 PROCEDURE — 63600175 PHARM REV CODE 636 W HCPCS: Performed by: INTERNAL MEDICINE

## 2017-08-25 PROCEDURE — 27000221 HC OXYGEN, UP TO 24 HOURS

## 2017-08-25 PROCEDURE — 97161 PT EVAL LOW COMPLEX 20 MIN: CPT

## 2017-08-25 PROCEDURE — G8979 MOBILITY GOAL STATUS: HCPCS | Mod: CJ

## 2017-08-25 PROCEDURE — G8997 SWALLOW GOAL STATUS: HCPCS | Mod: CI

## 2017-08-25 PROCEDURE — 97535 SELF CARE MNGMENT TRAINING: CPT

## 2017-08-25 RX ORDER — INSULIN ASPART 100 [IU]/ML
2-4 INJECTION, SOLUTION INTRAVENOUS; SUBCUTANEOUS
Status: DISCONTINUED | OUTPATIENT
Start: 2017-08-25 | End: 2017-08-26

## 2017-08-25 RX ADMIN — LEVALBUTEROL 1.25 MG: 1.25 SOLUTION, CONCENTRATE RESPIRATORY (INHALATION) at 12:08

## 2017-08-25 RX ADMIN — TRAMADOL HYDROCHLORIDE 100 MG: 50 TABLET, COATED ORAL at 04:08

## 2017-08-25 RX ADMIN — CEFAZOLIN SODIUM 1 G: 1 SOLUTION INTRAVENOUS at 09:08

## 2017-08-25 RX ADMIN — FLUCONAZOLE 400 MG: 200 TABLET ORAL at 09:08

## 2017-08-25 RX ADMIN — CHLORHEXIDINE GLUCONATE 15 ML: 1.2 RINSE ORAL at 08:08

## 2017-08-25 RX ADMIN — FAMOTIDINE 20 MG: 20 TABLET, FILM COATED ORAL at 09:08

## 2017-08-25 RX ADMIN — ASPIRIN 81 MG CHEWABLE TABLET 81 MG: 81 TABLET CHEWABLE at 09:08

## 2017-08-25 RX ADMIN — TACROLIMUS 1 MG: 1 CAPSULE ORAL at 08:08

## 2017-08-25 RX ADMIN — TACROLIMUS 1 MG: 1 CAPSULE ORAL at 06:08

## 2017-08-25 RX ADMIN — CIPROFLOXACIN 400 MG: 2 INJECTION, SOLUTION INTRAVENOUS at 03:08

## 2017-08-25 RX ADMIN — VALGANCICLOVIR 450 MG: 450 TABLET, FILM COATED ORAL at 08:08

## 2017-08-25 RX ADMIN — VALGANCICLOVIR 450 MG: 450 TABLET, FILM COATED ORAL at 09:08

## 2017-08-25 RX ADMIN — SULFAMETHOXAZOLE AND TRIMETHOPRIM 1 TABLET: 800; 160 TABLET ORAL at 09:08

## 2017-08-25 RX ADMIN — CEFAZOLIN SODIUM 1 G: 1 SOLUTION INTRAVENOUS at 02:08

## 2017-08-25 RX ADMIN — DEXTROSE: 50 INJECTION, SOLUTION INTRAVENOUS at 12:08

## 2017-08-25 RX ADMIN — LEVALBUTEROL 1.25 MG: 1.25 SOLUTION, CONCENTRATE RESPIRATORY (INHALATION) at 07:08

## 2017-08-25 RX ADMIN — LEVALBUTEROL 1.25 MG: 1.25 SOLUTION, CONCENTRATE RESPIRATORY (INHALATION) at 04:08

## 2017-08-25 RX ADMIN — CEFAZOLIN SODIUM 1 G: 1 SOLUTION INTRAVENOUS at 06:08

## 2017-08-25 RX ADMIN — OXYCODONE HYDROCHLORIDE AND ACETAMINOPHEN 1 TABLET: 7.5; 325 TABLET ORAL at 08:08

## 2017-08-25 RX ADMIN — ENOXAPARIN SODIUM 40 MG: 100 INJECTION SUBCUTANEOUS at 06:08

## 2017-08-25 RX ADMIN — SODIUM CHLORIDE 1.4 UNITS/HR: 9 INJECTION, SOLUTION INTRAVENOUS at 06:08

## 2017-08-25 RX ADMIN — CHLORHEXIDINE GLUCONATE 15 ML: 1.2 RINSE ORAL at 12:08

## 2017-08-25 RX ADMIN — MYCOPHENOLATE MOFETIL 500 MG: 250 CAPSULE ORAL at 09:08

## 2017-08-25 RX ADMIN — FAMOTIDINE 20 MG: 20 TABLET, FILM COATED ORAL at 08:08

## 2017-08-25 RX ADMIN — INSULIN ASPART 2 UNITS: 100 INJECTION, SOLUTION INTRAVENOUS; SUBCUTANEOUS at 03:08

## 2017-08-25 RX ADMIN — MYCOPHENOLATE MOFETIL 500 MG: 250 CAPSULE ORAL at 08:08

## 2017-08-25 RX ADMIN — INSULIN ASPART 1 UNITS: 100 INJECTION, SOLUTION INTRAVENOUS; SUBCUTANEOUS at 08:08

## 2017-08-25 NOTE — PT/OT/SLP EVAL
Speech Language Pathology  Clinical Swallow Evaluation    Martin Hendrix Jr.   MRN: [id number]   Admitting Diagnosis: Lung transplanted    Diet recommendations: Solid Diet Level: Regular  Liquid Diet Level: Nectar Thick Head turned to the left for all sips and bites; No straws, HOB to 90 degrees, Small bites/sips, Alternating bites/sips, 1 bite/sip at a time, Remain upright 30 minutes post meal, ; and Meds whole 1 at a time   Modified barium Swallow Study to be performed the afternoon to r/o aspiration and determine if above recommendations are appropriate.    SLP Treatment Date: 08/25/17  Speech Start Time: 0840     Speech Stop Time: 0920     Speech Total (min): 40 min       TREATMENT BILLABLE MINUTES:  Eval Swallow and Oral Function 25 and Seld Care/Home Management Training 15    Diagnosis: Lung transplanted  Bilateral  S/p intubation    Past Medical History:   Diagnosis Date    Atrial fibrillation 8/23/2017    On home oxygen therapy     KRISTYN on CPAP     Osteopenia     Pulmonary hypertension     Sarcoidosis     Shortness of breath      Past Surgical History:   Procedure Laterality Date    ABDOMINAL SURGERY      6 weeks old    BRONCHOSCOPY      CHEST TUBE INSERTION      CHOLECYSTECTOMY      LUNG BIOPSY         Has the patient been evaluated by SLP for swallowing? : Yes  Keep patient NPO?: No   General Precautions: Standard, aspiration, nectar thick    Current Respiratory Status: nasal cannula (O2 sats were 94-96% prior to PO trials; fluctuated between 92-94% t/o PO trials, expect for going down to 91% briefly on one occasion)     Prior diet: regular/thins      Subjective:  Pt very pleasant and cooperative t/o evaluation.  Pt agreeable to recommendations and POC.     Pain/Comfort  Pain Rating 1: 0/10    Objective:        Oral Musculature Evaluation  Oral Musculature: WFL  Dentition: present and adequate  Mucosal Quality: good  Mandibular Strength and Mobility: WFL  Oral Labial Strength and Mobility:  WFL  Lingual Strength and Mobility: WFL  Velar Elevation: WFL  Buccal Strength and Mobility: WFL  Volitional Cough: fair; reduced force due to sternal pain upon cough  Volitional Swallow: elicited  Voice Prior to PO Intake: hoarse, breathy - ENT evaluated and pt found to have L TVC hypo-mobility     Bedside Swallow Eval:  Consistencies Assessed: Thin liquids cup sips x 3 (utilizing head turn to L x 2), Nectar thick liquids cup sips x 8 (utilizing head turn x 5), Puree 1/2 tsp x 1, full tsp x 1 (head in neutral position for all trials) and Solids 1/4 cracker x 2 (utilizing head turn to L x 1)  Oral Phase: WNL  Pharyngeal Phase: throat clearing after 2/3 thin liquid trials, 1/3 nectar thick liquid trials with head held in neutral position, and 1/2 solid trials with head held in neutral position. No overt s/s of aspiration observed with NTL trials when head turned to left or with pureed trials.    Additional Treatment:  Extensive education was provided regarding role of ST, purpose of swallow evaluation, aspiration and complications associated with aspiration, vocal cord function as it relates to airway protection during PO intake, ENT findings, and vocal cord dysfunction contributing to increased risk of aspiration.  SLP also educated pt on recommendations to undergo MBSS to r/o aspiration and determine safest PO diet and need for compensatory strategies.  SLP discussed current diet recommendations and compensatory strategies to be implemented until MBSS is performed later today.  SLP explained instructions for thickening liquids to a nectar consistency.  Pt expressing excellent understanding and is in agreement with plan.  White board updated.  Nurse and transplant team notified of recommendations. Order placed for MBSS today.       Assessment:  Martin Hendrix Jr. is a 56 y.o. male with a medical diagnosis of Lung transplanted and presents with dysphagia and dysphonia.           Discharge recommendations: Discharge  Facility/Level Of Care Needs:  (TBD pending progress)     Goals:    SLP Goals        Problem: SLP Goal    Goal Priority Disciplines Outcome   SLP Goal     SLP    Description:  Speech Language Pathology Goals  Goals expected to be met by 9/1:  1. Pt will tolerate regular consistencies and nectar thick liquids with head turn to left side without overt s/s of aspiration.  2. Pt will participate in Modified Barium Swallow Study to r/o aspiration and determine safest PO diet.                            Plan:   Patient to be seen Therapy Frequency: 5 x/week   Plan of Care expires: 09/23/17  Plan of Care reviewed with: patient  SLP Follow-up?: Yes         SLP G-Codes  Functional Assessment Tool Used: noms  Score: 5  Functional Limitations: Swallowing  Swallow Current Status (): FRANCISCA  Swallow Goal Status (): CI    LIBERTY Martins, DEVAN-SLP  08/25/2017     LIBERTY Martins, CCC-SLP  Speech Language Pathologist  [phone]  8/25/2017

## 2017-08-25 NOTE — PT/OT/SLP EVAL
Occupational Therapy  Evaluation    Martin Hendrix Jr.   MRN: [id number]   Admitting Diagnosis: Lung transplanted    OT Date of Treatment: 08/25/17   OT Start Time: 0956  OT Stop Time: 1018  OT Total Time (min): 22 min    Billable Minutes:  Evaluation 22 minutes    Diagnosis: Lung transplanted       Past Medical History:   Diagnosis Date    Atrial fibrillation 8/23/2017    On home oxygen therapy     KRISTYN on CPAP     Osteopenia     Pulmonary hypertension     Sarcoidosis     Shortness of breath       Past Surgical History:   Procedure Laterality Date    ABDOMINAL SURGERY      6 weeks old    BRONCHOSCOPY      CHEST TUBE INSERTION      CHOLECYSTECTOMY      LUNG BIOPSY       General Precautions: Standard, aspiration, nectar thick, sternal  Orthopedic Precautions: N/A  Braces: N/A    Patient History:  Living Environment  Lives With: alone  Living Arrangements: house  Transportation Available: family or friend will provide, car  Living Environment Comment: Pt lives in a [facility] alone. PLOF indep and bathes in a tub. Had O2 at home and CPAP. PLOF indep and daughter can assist upon D/C.   Equipment Currently Used at Home: CPAP, oxygen    Prior level of function:   Bed Mobility/Transfers: independent  Grooming: independent  Bathing: independent  Upper Body Dressing: independent  Lower Body Dressing: independent  Toileting: independent  Home Management Skills: independent    Dominant hand: right    Subjective:  Communicated with nurswe prior to session.  Pt agreeable to skilled OT services.  Chief Complaint: fatigue  Patient/Family stated goals: return to PLOF    Pain/Comfort  Pain Rating 1: 0/10  Pain Rating Post-Intervention 1: 0/10    Objective:  Patient found with: chest tube, oxygen, telemetry  Pt received w/ bed in chair position.    Cognitive Exam:  Oriented to: Person, Place, Time and Situation  Follows Commands/attention: Follows multistep  commands  Communication: clear/fluent  Memory:  No Deficits  noted  Safety awareness/insight to disability: intact  Coping skills/emotional control: Appropriate to situation    Visual/perceptual:  Intact    Physical Exam:  Postural examination/scapula alignment: Rounded shoulder  Skin integrity: Visible skin intact  Edema: None noted     Sensation:   Intact    Upper Extremity Range of Motion:  Right Upper Extremity: WFL  Left Upper Extremity: WFL    Upper Extremity Strength:  Right Upper Extremity: not tested d/t sternal precautions  Left Upper Extremity: not tested d/t sternal precautions   Strength: WFL    Fine motor coordination:   Intact    Gross motor coordination: WFL    Functional Mobility:  Bed Mobility:  Scooting/Bridging: Stand by Assistance  Supine to Sit: Stand by Assistance (HOB elevated)    Transfers:  Sit <> Stand Assistance: Stand By Assistance  Sit <> Stand Assistive Device: No Assistive Device  Bed <> Chair Technique: Stand Pivot  Bed <> Chair Transfer Assistance: Stand By Assistance  Bed <> Chair Assistive Device: No Assistive Device    Functional Ambulation: Pt ambulated 15 ft at at cga w/o AD.    Activities of Daily Living:  UE Dressing Level of Assistance: Minimum assistance (donned gown as robe)    Balance:   Static Sit: GOOD: Takes MODERATE challenges from all directions  Dynamic Sit: GOOD-: Maintains balance through MODERATE excursions of active trunk movement,     Static Stand: FAIR+: Takes MINIMAL challenges from all directions  Dynamic stand: FAIR: Needs CONTACT GUARD during gait    Therapeutic Activities and Exercises:  Pt educated on POC.  Pt educated on sternal precautions.    AM-PAC 6 CLICK ADL  How much help from another person does this patient currently need?  1 = Unable, Total/Dependent Assistance  2 = A lot, Maximum/Moderate Assistance  3 = A little, Minimum/Contact Guard/Supervision  4 = None, Modified [place]/Independent    Putting on and taking off regular lower body clothing? : 2  Bathing (including washing, rinsing, drying)?:  "3  Toileting, which includes using toilet, bedpan, or urinal? : 3  Putting on and taking off regular upper body clothing?: 3  Taking care of personal grooming such as brushing teeth?: 4  Eating meals?: 4  Total Score: 19    AM-PAC Raw Score CMS "G-Code Modifier Level of Impairment Assistance   6 % Total / Unable   7 - 9 CM 80 - 100% Maximal Assist   10-14 CL 60 - 80% Moderate Assist   15 - 19 CK 40 - 60% Moderate Assist   20 - 22 CJ 20 - 40% Minimal Assist   23 CI 1-20% SBA / CGA   24 CH 0% Independent/ Mod I       Patient left up in chair with all lines intact and call button in reach    Assessment:  Martin Hendrix Jr. is a 56 y.o. male with a medical diagnosis of Lung transplanted and presents with impairments listed below. Pt displayed decreased endurance as evidence of 02 sats dropping and pt requiring rest breaks. Pt would benefit from skilled OT services to improve independence and overall occupational functioning.    Rehab identified problem list/impairments: Rehab identified problem list/impairments: weakness, impaired endurance, impaired self care skills, impaired functional mobilty, gait instability, impaired balance, decreased upper extremity function, orthopedic precautions, impaired cardiopulmonary response to activity    Rehab potential is good.    Activity tolerance: Good    Discharge recommendations: Discharge Facility/Level Of Care Needs: home with home health     Barriers to discharge: Barriers to Discharge: Decreased caregiver support    Equipment recommendations:  (tbd)     GOALS:    Occupational Therapy Goals        Problem: Occupational Therapy Goal    Goal Priority Disciplines Outcome Interventions   Occupational Therapy Goal     OT, PT/OT     Description:  Goals to be met by: 9/1/2017     Patient will increase functional independence with ADLs by performing:    UE Dressing with Set-up Assistance.  LE Dressing with Moderate Assistance.  Grooming while standing with Contact Guard " Assistance.  Toileting from toilet with Minimal Assistance for hygiene and clothing management.   Toilet transfer to toilet with Contact Guard Assistance.                      PLAN:  Patient to be seen 5 x/week to address the above listed problems via self-care/home management, therapeutic activities, therapeutic exercises  Plan of Care expires: 09/25/17  Plan of Care reviewed with: patient    Neymar Josue, OT  08/25/2017

## 2017-08-25 NOTE — PROGRESS NOTES
[facility]  Adult Nutrition  Progress Note    SUMMARY     Recommendations    Recommendation/Intervention: Due to pt mainly eating liquids, please order Boost Glucose Control Strawberry TID. Provided patient with post transplant diet education. RD following    Goals: Pt to consume/tolerate > 75% EEN and EPN with knowledge of proper diet   Nutrition Goal Status: new  Communication of RD Recs: reviewed with RN    Reason for Assessment    Reason for Assessment: nurse/nurse practitioner consult  Diagnosis: transplant/postoperative complications (Lung tx 8/22/2017)  Relevent Medical History: pulmonary sarcoidosis, pulmonary hypertension   Interdisciplinary Rounds: did not attend     General Information Comments: Pt with fair appetite, ordering lunch at visit, only tolerating liquids and very soft foods at this time (yogurt, peaches, broth), daughter at bedside. No gi complaints but weakness is preventing him from eating proper diet. No recent wt loss noted.     Nutrition Discharge Planning: Post transplant nutrition education provided. Food safety/drug interactions emphasized. General healthy diet recommended. RD contact name/info provided. All questions answered. No other needs identified. Caregiver present. Pre-transplant workup completed and low Na diet education provided 4/2017    Nutrition Prescription Ordered    Current Diet Order: Diabetic 2000 kcal       Evaluation of Received Nutrients/Fluid Intake    % Intake of Estimated Energy Needs: 25 - 50 %  % Meal Intake: 50%     Nutrition Risk Screen     Nutrition Risk Screen: no indicators present    Nutrition/Diet History    Patient Reported Diet/Restrictions/Preferences: general, carbohydrate counting      Labs/Tests/Procedures/Meds    Pertinent Labs Reviewed: reviewed  Pertinent Labs Comments: Na 130, BUN 67, Cr 2.0, glu 166, Alk phos 51     Pertinent Medications Comments: famotidine, insulin, prednisone, tacro    Physical Findings    Overall  "Physical Appearance: shortness of breath, overweight     Oral/Mouth Cavity: other (see comments) (difficulty speaking, coarse throat)  Skin: incision (chest)    Anthropometrics    Temp: 97.8 °F (36.6 °C)     Height: 5' 10" (177.8 cm)  Weight Method: Bed Scale  Weight: 106.2 kg (234 lb 2.1 oz)  Ideal Body Weight (IBW), Male: 166 lb     % Ideal Body Weight, Male (lb): 130.95 lb     BMI (Calculated): 31.3  BMI Grade: 30 - 34.9- obesity - grade I                            Estimated/Assessed Needs    Weight Used For Calorie Calculations: 106.2 kg (234 lb 2.1 oz)   Height (cm): 177.8 cm  Energy Calorie Requirements (kcal): 2385 kcal (1.25 PAL)  Energy Need Method: [place]-St Jeor        RMR ([place]-St. Jeor Equation): 1908.25        Weight Used For Protein Calculations: 106.2 kg (234 lb 2.1 oz)  Protein Requirements: 106-127 g/day (1-1.2 g/kg)    Fluid Need Method: RDA Method   RDA Method (mL): 2385               Assessment and Plan    * Lung transplanted    Nutrition Diagnosis:  Altered GI function    Related to (etiology):   Difficulty swallowing 2' pain    Signs and Symptoms (as evidenced by):   Pt s/p lung tx     Interventions/Recommendations (treatment strategy):  See RD recs above.     Nutrition Diagnosis Status:   New              Monitor and Evaluation    Food and Nutrient Intake: energy intake, food and beverage intake  Food and Nutrient Adminstration: diet order  Knowledge/Beliefs/Attitudes: food and nutrition knowledge/skill  Physical Activity and Function: nutrition-related ADLs and IADLs  Anthropometric Measurements: weight, body mass index, weight change  Biochemical Data, Medical Tests and Procedures: electrolyte and renal panel, gastrointestinal profile, glucose/endocrine profile, inflammatory profile, lipid profile  Nutrition-Focused Physical Findings: overall appearance    Nutrition Risk    Level of Risk:  (2x/week)    Nutrition Follow-Up         "

## 2017-08-25 NOTE — PROGRESS NOTES
Pt transported back to room per stretcher by this RN.  Telemetry maintained. o2 @2NC.   Pt back to bed with family in room. Ordering dinner. Call bell at side.

## 2017-08-25 NOTE — PROGRESS NOTES
Bedside swallow completed.  SLP recommending continue regular diet but downgrade to nectar thick liquid with head turn to left for all sips and bites until MBSS can be performed this afternoon to further evaluate swallowing function.  Further recommendations to follow. Formal bedside swallow evaluation report to follow as well.    LIBERTY Martins, CCC-SLP  Speech Language Pathologist  [phone]  8/25/2017

## 2017-08-25 NOTE — PROGRESS NOTES
"[facility]  Endocrinology  Progress Note    Admit Date: 8/21/2017     Reason for Consult: Management of  Hyperglycemia     Surgical Procedure and Date:  8/22/17 s/p bilateral lung transplant       HPI:  Mr. 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 He is now s/p bilateral lung transplant with steroid induced hyperglycemia. Endocrine consulted for bg management.       Interval HPI:  BG controlled on stepped down transition insulin drip overnight. Breakfast BG 97, drip suspended. On last high dose SM taper today. Prednisone taper tomorrow. Tolerating < 50% diet, only bites of eggs this AM so Novolog held per staff. Denies nausea. Afebrile. OOB to chair.    /74 (BP Location: Right arm, Patient Position: Sitting)   Pulse 67   Temp 97.7 °F (36.5 °C) (Oral)   Resp 19   Ht 5' 10" (1.778 m)   Wt 106.2 kg (234 lb 2.1 oz)   SpO2 (!) 94%   BMI 33.59 kg/m²       Labs Reviewed and Include      Recent Labs  Lab 08/25/17  0500   *   CALCIUM 7.9*   ALBUMIN 2.6*   PROT 5.2*   *   K 4.9   CO2 24   CL 97   BUN 67*   CREATININE 2.0*   ALKPHOS 51*   ALT 38   AST 39   BILITOT 0.4     Lab Results   Component Value Date    WBC 20.22 (H) 08/25/2017    HGB 9.9 (L) 08/25/2017    HCT 28.6 (L) 08/25/2017    MCV 89 08/25/2017     08/25/2017     No results for input(s): TSH, FREET4 in the last 168 hours.  Lab Results   Component Value Date    HGBA1C 6.7 (H) 04/24/2017       Nutritional status:   Body mass index is 33.59 kg/m².  Lab Results   Component Value Date    ALBUMIN 2.6 (L) 08/25/2017    ALBUMIN 2.8 (L) 08/24/2017    ALBUMIN 2.7 (L) 08/23/2017     No results found for: PREALBUMIN    Estimated Creatinine Clearance: 51.5 mL/min (based on Cr of 2).    Accu-Checks  Recent Labs      08/24/17   0507  08/24/17   0601  08/24/17   0651  08/24/17   0754  08/24/17   0925  08/24/17   1001  08/24/17   1108  08/24/17   1159  08/24/17   1825  08/25/17   " 0234   POCTGLUCOSE  244*  212*  190*  151*  104  139*  152*  155*  166*  114*       Current Medications and/or Treatments Impacting Glycemic Control  Immunotherapy:  Immunosuppressants         Stop Route Frequency     basiliximab (SIMULECT) 20 mg in sodium chloride 0.9% 50 mL chemo infusion      -- IV Once     mycophenolate capsule 500 mg      -- Oral 2 times daily     tacrolimus capsule 1 mg      -- Oral 2 times daily        Steroids:   Hormones     Start     Stop Route Frequency Ordered    08/29/17 0900  predniSONE tablet 40 mg      -- Oral Daily 08/23/17 0808    08/28/17 0900  predniSONE tablet 50 mg      08/29 0859 Oral Daily 08/23/17 0808    08/27/17 0900  predniSONE tablet 70 mg      08/28 0859 Oral Daily 08/23/17 0808    08/26/17 0900  predniSONE tablet 100 mg      08/27 0859 Oral Daily 08/23/17 0808    08/25/17 0900  methylPREDNISolone sodium succinate (SOLU-MEDROL) 200 mg in dextrose 5 % 100 mL IVPB      08/26 0859 IV Daily 08/23/17 0808        Pressors:    Autonomic Drugs     None        Hyperglycemia/Diabetes Medications: Antihyperglycemics     Start     Stop Route Frequency Ordered    08/24/17 1645  insulin aspart pen 3-5 Units      -- SubQ 3 times daily with meals 08/24/17 1311    08/24/17 1415  insulin regular (Humulin R) 100 Units in sodium chloride 0.9% 100 mL infusion      -- IV Continuous 08/24/17 1311    08/24/17 1410  insulin aspart pen 0-4 Units      -- SubQ As needed (PRN) 08/24/17 1311          ASSESSMENT and PLAN    Hyperglycemia    Bg goal 140-180. Decreasing insulin needs. Trial hold basal insulin. Decrease Novolog to 2-4 units depending on meal intake. BG Ac/HS/02 with low dose correction.    Discharge planning is ongoing.        Sarcoidosis    S/p lung txp 8/22/17           * Lung transplanted    Per LUT          Immunosuppression    May increase insulin resistance.  On high dose SM taper. Last dose 8/25.  Prednisone taper beginning 8/26.          Non morbid obesity due to excess calories     May increase insulin resistance   Body mass index is 33.59 kg/m².                SANJAY Villalpando  Endocrinology  [facility]

## 2017-08-25 NOTE — ASSESSMENT & PLAN NOTE
Bactrim, valganciclovir, and diflucan for opportunistic infection prophylaxis. Continue ciprofloxacin; and Ancef for MSSA in donor.

## 2017-08-25 NOTE — SUBJECTIVE & OBJECTIVE
"Interval HPI:  BG controlled on stepped down transition insulin drip overnight. Breakfast BG 97, drip suspended. On last high dose SM taper today. Prednisone taper tomorrow. Tolerating < 50% diet, only bites of eggs this AM so Novolog held per staff. Denies nausea. Afebrile. OOB to chair.    /74 (BP Location: Right arm, Patient Position: Sitting)   Pulse 67   Temp 97.7 °F (36.5 °C) (Oral)   Resp 19   Ht 5' 10" (1.778 m)   Wt 106.2 kg (234 lb 2.1 oz)   SpO2 (!) 94%   BMI 33.59 kg/m²     Labs Reviewed and Include      Recent Labs  Lab 08/25/17  0500   *   CALCIUM 7.9*   ALBUMIN 2.6*   PROT 5.2*   *   K 4.9   CO2 24   CL 97   BUN 67*   CREATININE 2.0*   ALKPHOS 51*   ALT 38   AST 39   BILITOT 0.4     Lab Results   Component Value Date    WBC 20.22 (H) 08/25/2017    HGB 9.9 (L) 08/25/2017    HCT 28.6 (L) 08/25/2017    MCV 89 08/25/2017     08/25/2017     No results for input(s): TSH, FREET4 in the last 168 hours.  Lab Results   Component Value Date    HGBA1C 6.7 (H) 04/24/2017       Nutritional status:   Body mass index is 33.59 kg/m².  Lab Results   Component Value Date    ALBUMIN 2.6 (L) 08/25/2017    ALBUMIN 2.8 (L) 08/24/2017    ALBUMIN 2.7 (L) 08/23/2017     No results found for: PREALBUMIN    Estimated Creatinine Clearance: 51.5 mL/min (based on Cr of 2).    Accu-Checks  Recent Labs      08/24/17   0507  08/24/17   0601  08/24/17   0651  08/24/17   0754  08/24/17   0925  08/24/17   1001  08/24/17   1108  08/24/17   1159  08/24/17   1825  08/25/17   0234   POCTGLUCOSE  244*  212*  190*  151*  104  139*  152*  155*  166*  114*       Current Medications and/or Treatments Impacting Glycemic Control  Immunotherapy:  Immunosuppressants         Stop Route Frequency     basiliximab (SIMULECT) 20 mg in sodium chloride 0.9% 50 mL chemo infusion      -- IV Once     mycophenolate capsule 500 mg      -- Oral 2 times daily     tacrolimus capsule 1 mg      -- Oral 2 times daily        Steroids: "   Hormones     Start     Stop Route Frequency Ordered    08/29/17 0900  predniSONE tablet 40 mg      -- Oral Daily 08/23/17 0808    08/28/17 0900  predniSONE tablet 50 mg      08/29 0859 Oral Daily 08/23/17 0808    08/27/17 0900  predniSONE tablet 70 mg      08/28 0859 Oral Daily 08/23/17 0808    08/26/17 0900  predniSONE tablet 100 mg      08/27 0859 Oral Daily 08/23/17 0808    08/25/17 0900  methylPREDNISolone sodium succinate (SOLU-MEDROL) 200 mg in dextrose 5 % 100 mL IVPB      08/26 0859 IV Daily 08/23/17 0808        Pressors:    Autonomic Drugs     None        Hyperglycemia/Diabetes Medications: Antihyperglycemics     Start     Stop Route Frequency Ordered    08/24/17 1645  insulin aspart pen 3-5 Units      -- SubQ 3 times daily with meals 08/24/17 1311    08/24/17 1415  insulin regular (Humulin R) 100 Units in sodium chloride 0.9% 100 mL infusion      -- IV Continuous 08/24/17 1311    08/24/17 1410  insulin aspart pen 0-4 Units      -- SubQ As needed (PRN) 08/24/17 1311

## 2017-08-25 NOTE — PLAN OF CARE
Problem: Patient Care Overview  Goal: Plan of Care Review  Outcome: Ongoing (interventions implemented as appropriate)  Pt AAOx4. On 2L NC with O2 sats >98%. Chest tubes x4 to water seal; serosanguineous output noted and dressing clean, dry, intact. Pt advanced to 2000 calorie ADA diet; denies nausea/vomiting. Accuchecks AC/HS and 2 am. Insulin gtt at 1.7 Units/hr. Pt voiding per urinal. 250 cc NS bolus given this AM for low urine output. Pt out of bed to chair all day. Epi and amiodarone gtt turned off this morning per Dr. Wolfe. Pain managed with PRN PO meds. No breakdown noted to bony prominences. Daughter and mother at bedside.

## 2017-08-25 NOTE — PLAN OF CARE
Problem: Occupational Therapy Goal  Goal: Occupational Therapy Goal  Goals to be met by: 9/1/2017     Patient will increase functional independence with ADLs by performing:    UE Dressing with Set-up Assistance.  LE Dressing with Moderate Assistance.  Grooming while standing with Contact Guard Assistance.  Toileting from toilet with Minimal Assistance for hygiene and clothing management.   Toilet transfer to toilet with Contact Guard Assistance.    Initiate OT POC    Comments: Neymar Josue OTR/L  8/25/2017

## 2017-08-25 NOTE — ASSESSMENT & PLAN NOTE
Nutrition Diagnosis:  Altered GI function    Related to (etiology):   Difficulty swallowing 2' pain    Signs and Symptoms (as evidenced by):   Pt s/p lung tx     Interventions/Recommendations (treatment strategy):  See RD recs above.     Nutrition Diagnosis Status:   New

## 2017-08-25 NOTE — PLAN OF CARE
Problem: SLP Goal  Goal: SLP Goal  Speech Language Pathology Goals  Goals expected to be met by 9/1:  1. Pt will tolerate regular consistencies and nectar thick liquids with head turn to left side without overt s/s of aspiration.  2. Pt will participate in Modified Barium Swallow Study to r/o aspiration and determine safest PO diet.          Outcome: Ongoing (interventions implemented as appropriate)  MBSS completed. Patient presents with functional oropharyngeal swallow. REC: Regular diet with thin liquids, whole medications one at a time, no straws, strict aspiration precautions, and ST to continue to monitor. Findings reviewed with nurse and nurse explains she will notify MD. Thank you.    SILVIO Garcia., [facility]-SLP  Speech-Language Pathology  Pager: [phone]  8/25/2017

## 2017-08-25 NOTE — PT/OT/SLP EVAL
Physical Therapy  Evaluation    Martin Hendrix Jr.   MRN: [id number]   Admitting Diagnosis: Lung transplanted    PT Received On: 08/25/17  PT Start Time: 0956     PT Stop Time: 1018    PT Total Time (min): 22 min       Billable Minutes:  Evaluation 1 PROCEDURE    Diagnosis: Lung transplanted    Past Medical History:   Diagnosis Date    Atrial fibrillation 8/23/2017    On home oxygen therapy     KRISTYN on CPAP     Osteopenia     Pulmonary hypertension     Sarcoidosis     Shortness of breath       Past Surgical History:   Procedure Laterality Date    ABDOMINAL SURGERY      6 weeks old    BRONCHOSCOPY      CHEST TUBE INSERTION      CHOLECYSTECTOMY      LUNG BIOPSY         Referring physician: Yaneth  Date referred to PT: 8/23/17    General Precautions: Standard, fall, aspiration, nectar thick, sternal  Orthopedic Precautions: N/A   Braces: N/A       Do you have any cultural, spiritual, Mandaen conflicts, given your current situation?: none noted    Patient History:  Lives With: alone  Living Arrangements: house  Home Layout: Able to live on 1st floor  Stair Railings at Home: none  Transportation Available: car  Living Environment Comment: Pt reports that he lives alone in a 1 story home with 0 YVES. He reports he will have assistance from his daughter upon d/c. Pt reports he was indep with use of O2 at home.   Equipment Currently Used at Home: oxygen  DME owned (not currently used): none    Previous Level of Function:  Ambulation Skills: independent  Transfer Skills: independent  ADL Skills: independent  Work/Leisure Activity: independent    Subjective:  Communicated with RN prior to session.    Chief Complaint: discomfort in incision site  Patient goals: get back home    Pain/Comfort  Pain Rating 1: 1/10  Location - Side 1: Bilateral  Location - Orientation 1: lower  Location 1: abdomen  Pain Addressed 1: Pre-medicate for activity, Cessation of Activity, Nurse notified  Pain Rating Post-Intervention 1:  1/10      Objective:   Patient found with: chest tube, oxygen, telemetry, central line     Cognitive Exam:  Oriented to: Person, Place, Time and Situation    Follows Commands/attention: Follows multistep  commands  Communication: low voice volume, raspy  Safety awareness/insight to disability: intact    Physical Exam:  Postural examination/scapula alignment: Rounded shoulder    Skin integrity: Visible skin intact  Edema: None noted     Sensation:   Intact BLE    Lower Extremity Range of Motion:  Right Lower Extremity: WFL  Left Lower Extremity: WFL    Lower Extremity Strength:  Right Lower Extremity: WFL  Left Lower Extremity: WFL     Functional Mobility:  Bed Mobility:  Rolling/Turning Right: Stand by assistance  Scooting/Bridging: Stand by Assistance  Supine to Sit: Stand by Assistance (elevated HOB)    Transfers:  Sit <> Stand Assistance: Stand By Assistance  Sit <> Stand Assistive Device: No Assistive Device    Gait:   Gait Distance: 15 feet  Assistance 1: Contact Guard Assistance  Gait Assistive Device: No device  Gait Pattern: swing-through gait  Gait Deviation(s): decreased bernadette    Stairs:  Not tested    Balance:   Static Sit: FAIR: Maintains without assist, but unable to take any challenges   Dynamic Sit: FAIR+: Maintains balance through MINIMAL excursions of active trunk motion  Static Stand: FAIR: Maintains without assist but unable to take challenges  Dynamic stand: FAIR: Needs CONTACT GUARD during gait    Therapeutic Activities and Exercises:  PT educated on performance of B ankle pumps with 5 second hold in DF for controlled gastroc stretching. Pt demonstrated and acknowledged understanding.     AM-PAC 6 CLICK MOBILITY  How much help from another person does this patient currently need?   1 = Unable, Total/Dependent Assistance  2 = A lot, Maximum/Moderate Assistance  3 = A little, Minimum/Contact Guard/Supervision  4 = None, Modified [place]/Independent    Turning over in bed (including  adjusting bedclothes, sheets and blankets)?: 3  Sitting down on and standing up from a chair with arms (e.g., wheelchair, bedside commode, etc.): 3  Moving from lying on back to sitting on the side of the bed?: 3  Moving to and from a bed to a chair (including a wheelchair)?: 3  Need to walk in hospital room?: 2  Climbing 3-5 steps with a railing?: 2  Total Score: 16     AM-PAC Raw Score CMS G-Code Modifier Level of Impairment Assistance   6 % Total / Unable   7 - 9 CM 80 - 100% Maximal Assist   10 - 14 CL 60 - 80% Moderate Assist   15 - 19 CK 40 - 60% Moderate Assist   20 - 22 CJ 20 - 40% Minimal Assist   23 CI 1-20% SBA / CGA   24 CH 0% Independent/ Mod I     Patient left up in chair with all lines intact, call button in reach and RN notified.    Assessment:   Martin Hendrix Jr. is a 56 y.o. male with a medical diagnosis of Lung transplanted and presents with decreased endurance, balance, and overall functional mobility. Pt at overall CGA/SBA for transfers and mobility. Pt able to abide by sternal precautions throughout mobility. Pt will continue to benefit from skilled PT to address deficits and increase functional mobility. PT to focus on endurance maximization, balance, and safety with mobility. PT recommends home with home health once medically stable with DME TBD pending pt progress. Whiteboard updated and RN notified that patient safe to transfer with nursing and ambulate short distances to increase carryover and function during skilled therapy sessions.     Rehab identified problem list/impairments: Rehab identified problem list/impairments: impaired endurance, impaired functional mobilty, pain, impaired cardiopulmonary response to activity    Rehab potential is good.    Activity tolerance: Fair    Discharge recommendations: Discharge Facility/Level Of Care Needs: home with home health     Barriers to discharge: Barriers to Discharge: Decreased caregiver support    Equipment recommendations:  Equipment Needed After Discharge:  (TBD pending pt progress)     GOALS:    Physical Therapy Goals        Problem: Physical Therapy Goal    Goal Priority Disciplines Outcome Goal Variances Interventions   Physical Therapy Goal     PT/OT, PT      Description:  Goals to be met by: 17     Patient will increase functional independence with mobility by performin. Supine to sit with Stand-by Assistance  2. Sit to supine with Stand-by Assistance  3. Sit to stand transfer with Stand-by Assistance  4. Bed to chair transfer with Stand-by Assistance   5. Gait  x 150 feet with Stand-by Assistance with rest breaks as needed.    6. Lower extremity exercise program x 15 reps per handout, with independence                      PLAN:    Patient to be seen 4 x/week to address the above listed problems via    Plan of Care expires: 17  Plan of Care reviewed with: patient    Functional Assessment Tool Used: AMPAC  Score: 16  Functional Limitation: Mobility: Walking and moving around  Mobility: Walking and Moving Around Current Status (): CK  Mobility: Walking and Moving Around Goal Status (): FRANCISCA Singh, PT  2017

## 2017-08-25 NOTE — SUBJECTIVE & OBJECTIVE
Subjective:     Interval History: No acute events overnight.  Has some dysphonia.    Continuous Infusions:   insulin (HUMAN R) infusion (adults) 1.4 Units/hr (08/25/17 0611)     Scheduled Meds:   aspirin  81 mg Oral Daily    [START ON 8/26/2017] basiliximab (SIMULECT) chemo infusion  20 mg Intravenous Once    ceFAZolin 1 g/50ml Dextrose IVPB  1 g Intravenous Q8H    chlorhexidine  15 mL Mouth/Throat BID    ciprofloxacin  400 mg Intravenous Q12H    enoxaparin  40 mg Subcutaneous Daily    famotidine  20 mg Oral BID    fluconazole  400 mg Oral Daily    insulin aspart  3-5 Units Subcutaneous TIDWM    levalbuterol  1.25 mg Nebulization Q8H    methylPREDNISolone (SOLU-Medrol) IVPB (doses > 250 mg)   Intravenous Daily    Followed by    [START ON 8/26/2017] predniSONE  100 mg Oral Daily    Followed by    [START ON 8/27/2017] predniSONE  70 mg Oral Daily    Followed by    [START ON 8/28/2017] predniSONE  50 mg Oral Daily    Followed by    [START ON 8/29/2017] predniSONE  40 mg Oral Daily    mycophenolate  500 mg Oral BID    sulfamethoxazole-trimethoprim 800-160mg  1 tablet Oral Every Mon, Wed, Fri    tacrolimus  1 mg Oral BID    valganciclovir  450 mg Oral BID     PRN Meds:acetaminophen, dextrose 50%, dextrose 50%, glucagon (human recombinant), glucose, glucose, HYDROmorphone, insulin aspart, magnesium sulfate IVPB **AND** magnesium sulfate IVPB, ondansetron, oxycodone-acetaminophen, potassium chloride 10% **AND** potassium chloride 10% **AND** potassium chloride 10%, promethazine (PHENERGAN) IVPB, tramadol    Review of patient's allergies indicates:  No Known Allergies    Review of Systems   Constitutional: Negative for appetite change, chills, fatigue, fever and unexpected weight change.   HENT: Negative for congestion, ear pain, hearing loss, mouth sores and postnasal drip.    Eyes: Negative for photophobia, pain, discharge, redness, itching and visual disturbance.   Respiratory: Negative for cough,  chest tightness and shortness of breath.    Cardiovascular: Positive for chest pain (at incision sites) and leg swelling. Negative for palpitations.   Gastrointestinal: Negative for abdominal distention, abdominal pain, blood in stool, constipation and diarrhea.   Endocrine: Negative for cold intolerance, heat intolerance, polydipsia, polyphagia and polyuria.   Genitourinary: Negative for decreased urine volume, difficulty urinating, dysuria, frequency, hematuria and urgency.   Musculoskeletal: Negative for arthralgias and joint swelling.   Allergic/Immunologic: Positive for immunocompromised state. Negative for environmental allergies and food allergies.   Neurological: Positive for weakness (due to debility). Negative for dizziness, tremors, syncope, speech difficulty and numbness.   Hematological: Negative for adenopathy. Does not bruise/bleed easily.   Psychiatric/Behavioral: Negative for agitation, confusion and hallucinations.     Objective:   Physical Exam   Constitutional: He is oriented to person, place, and time. He appears well-developed and well-nourished. No distress.   HENT:   Head: Normocephalic and atraumatic.   Nose: Nose normal.   Mouth/Throat: Oropharynx is clear and moist. No oropharyngeal exudate.   Eyes: Conjunctivae and EOM are normal. Pupils are equal, round, and reactive to light. No scleral icterus.   Neck: Normal range of motion. Neck supple. No JVD present. No tracheal deviation present. No thyromegaly present.   Cardiovascular: Normal rate, regular rhythm and normal heart sounds.    Pulmonary/Chest: Effort normal and breath sounds normal. No respiratory distress. He has no wheezes. He has no rales. He exhibits no tenderness.   Chest tubes in place with serosanguinous output and no air leaks.   Abdominal: Soft. Bowel sounds are normal. He exhibits no distension. There is no tenderness.   Musculoskeletal: Normal range of motion. He exhibits edema (trace BLE). He exhibits no tenderness.    Neurological: He is alert and oriented to person, place, and time. No cranial nerve deficit.   Skin: Skin is warm and dry. He is not diaphoretic.   Psychiatric: He has a normal mood and affect. Thought content normal.         Vital Signs (Most Recent):  Temp: 97.7 °F (36.5 °C) (08/25/17 0800)  Pulse: 67 (08/25/17 0800)  Resp: 19 (08/25/17 0800)  BP: 135/74 (08/25/17 0400)  SpO2: (!) 94 % (08/25/17 0800) Vital Signs (24h Range):  Temp:  [97.5 °F (36.4 °C)-98.2 °F (36.8 °C)] 97.7 °F (36.5 °C)  Pulse:  [53-80] 67  Resp:  [0-40] 19  SpO2:  [94 %-100 %] 94 %  BP: ()/(51-82) 135/74     Weight: 106.2 kg (234 lb 2.1 oz)  Body mass index is 33.59 kg/m².      Intake/Output Summary (Last 24 hours) at 08/25/17 0945  Last data filed at 08/25/17 0800   Gross per 24 hour   Intake           5267.3 ml   Output             1470 ml   Net           3797.3 ml       Significant Labs:  CBC:    Recent Labs  Lab 08/25/17  0500   WBC 20.22*   RBC 3.21*   HGB 9.9*   HCT 28.6*      MCV 89   MCH 30.8   MCHC 34.6     BMP:    Recent Labs  Lab 08/25/17  0500   *   K 4.9   CL 97   CO2 24   BUN 67*   CREATININE 2.0*   CALCIUM 7.9*      Tacrolimus Levels:    Recent Labs  Lab 08/24/17  0500   TACROLIMUS <1.5*     Microbiology:  Microbiology Results (last 7 days)     Procedure Component Value Units Date/Time    AFB Culture & Smear [[id number]] Collected:  08/23/17 0957    Order Status:  Completed Specimen:  Respiratory from Bronchial Wash Updated:  08/24/17 2127     AFB Culture & Smear Culture in progress     AFB CULTURE STAIN No acid fast bacilli seen.    Narrative:       Bronchial Wash    Aerobic culture [[id number]]  (Susceptibility) Collected:  08/22/17 0816    Order Status:  Completed Specimen:  Tissue from Lung, Lingula Updated:  08/24/17 1309     Aerobic Bacterial Culture --     STAPHYLOCOCCUS AUREUS  Few      Narrative:       Bronchus of donor lung    Culture, Respiratory [[id number]] Collected:  08/23/17 0972    Order Status:   Completed Specimen:  Respiratory from Bronchial Wash Updated:  08/24/17 0922     Respiratory Culture --     STAPHYLOCOCCUS AUREUS  Moderate  Susceptibility pending  Normal respiratory mary also present       Gram Stain (Respiratory) Few WBC's     Gram Stain (Respiratory) No organisms seen    Narrative:       Bronchial Wash    Culture, Anaerobe [[id number]] Collected:  08/22/17 0814    Order Status:  Completed Specimen:  Tissue from Lung, Lingula Updated:  08/24/17 0915     Anaerobic Culture Culture in progress    Narrative:       Bronchus of donor lung    Fungus culture [[id number]] Collected:  08/22/17 0815    Order Status:  Completed Specimen:  Tissue from Lung, Lingula Updated:  08/23/17 1358     Fungus (Mycology) Culture Culture in progress    Narrative:       Bronchus of donor lung    AFB Culture & Smear [[id number]] Collected:  08/22/17 0814    Order Status:  Completed Specimen:  Tissue from Lung, Lingula Updated:  08/23/17 1354     AFB Culture & Smear Culture in progress     AFB CULTURE STAIN No acid fast bacilli seen.    Narrative:       Bronchus of donor lung    Fungus culture [[id number]] Collected:  08/23/17 0957    Order Status:  Sent Specimen:  Respiratory from Bronchial Wash Updated:  08/23/17 1241    Urine culture [[id number]] Collected:  08/21/17 2027    Order Status:  Completed Specimen:  Urine from Urine, Clean Catch Updated:  08/22/17 2333     Urine Culture, Routine No growth    Gram stain [[id number]] Collected:  08/22/17 0814    Order Status:  Completed Specimen:  Tissue from Lung, Lingula Updated:  08/22/17 0948     Gram Stain Result No WBC's      No organisms seen    Narrative:       Bronchus of donor lung          I have reviewed all pertinent labs within the past 24 hours.    Diagnostic Results:  X-Ray: Reviewed   There is postoperative change, cardiomegaly, moderate edema, and no change.

## 2017-08-25 NOTE — ASSESSMENT & PLAN NOTE
Bg goal 140-180. Decreasing insulin needs. Trial hold basal insulin. Decrease Novolog to 2-4 units depending on meal intake. BG Ac/HS/02 with low dose correction.    Discharge planning is ongoing.

## 2017-08-25 NOTE — ASSESSMENT & PLAN NOTE
There is no evidence of graft dysfunction. Currently on 2L NC.  Will wean oxygen as tolerated.  Continue CPT, incentive spirometer, and bronchodilators. Continue to monitor chest tube output which is adequate.

## 2017-08-25 NOTE — PLAN OF CARE
Problem: Physical Therapy Goal  Goal: Physical Therapy Goal  Goals to be met by: 17     Patient will increase functional independence with mobility by performin. Supine to sit with Stand-by Assistance  2. Sit to supine with Stand-by Assistance  3. Sit to stand transfer with Stand-by Assistance  4. Bed to chair transfer with Stand-by Assistance   5. Gait  x 150 feet with Stand-by Assistance with rest breaks as needed.    6. Lower extremity exercise program x 15 reps per handout, with independence    Eval. See note for details.

## 2017-08-25 NOTE — PROCEDURES
Modifed Barium Swallow Study  Speech Start Time: 1657  Speech Stop Time: 1722  Speech Total (min): 25 min    SLP Treatment Date: 08/25/17    Reason for Referral  Patient was referred for a Modified Barium Swallow Study to assess the efficiency of his/her swallow function, rule out aspiration and make recommendations regarding safe dietary consistencies, effective compensatory strategies, and safe eating environment.     Diagnosis   Lung transplanted    Past Medical History:   Diagnosis Date    Atrial fibrillation 8/23/2017    On home oxygen therapy     KRISTYN on CPAP     Osteopenia     Pulmonary hypertension     Sarcoidosis     Shortness of breath      Past Surgical History:   Procedure Laterality Date    ABDOMINAL SURGERY      6 weeks old    BRONCHOSCOPY      CHEST TUBE INSERTION      CHOLECYSTECTOMY      LUNG BIOPSY          General Precautions: aspiration, fall, sternal  Current Respiratory Status: nasal cannula    Intubation history: 8/22/17 - 8/23/17  CXR 8/24: There is postoperative change, cardiomegaly, moderate edema, and no change  Other: ENT evaluated and pt found to have L TVC hypo-mobility     Recommendations  · Regular diet  · Thin liquids  · No straws  · Double swallows between bites  · Pace bites/sips,  Take break when feeling short of breath  · Only eat when awake/alert  · All PO intake at 90 degree angle  · Continue to monitor for signs and symptoms of aspiration and discontinue oral feeding should you notice any of the following: watery eyes, reddened facial area, wet vocal quality, increased work of breathing, change in respiratory status, increased congestion, coughing, fever, etc.    Oral Peripheral Examination  Oral Musculature Evaluation  Oral Musculature: WFL  Dentition: present and adequate  Mucosal Quality: good  Mandibular Strength and Mobility: WFL  Oral Labial Strength and Mobility: WFL  Lingual Strength and Mobility: WFL  Velar Elevation: WFL  Buccal Strength and Mobility:  WFL  Volitional Cough: fair; reduced force due to sternal pain upon cough  Volitional Swallow: elicited  Voice Prior to PO Intake: hoarse, breathy - ENT evaluated and pt found to have L TVC hypo-mobility    Procedure:      Visualization:  · Pt was seen in the lateral view  · Pt was pleasant , cooperative and willingly accepted all trials  · Central lines observed in place  · Supplement O2 in place via nasal canula    Consistencies Assessed  · Pt was offered 43 mL of thin liquid via tsp sip, single straw sip, continuous straw sip and single cup sip from open cup    · Pt was offered 10 mL of puree via teaspoon   bites  · Pt was offered solid consistency coated with barium paste x1    Oral Preparation / Oral Phase  WFL. Pt with adequate bolus acceptance and timely A-P transfer across consistencies     Pharyngeal Phase  Patient with essentially timely swallow initiation for age. No aspiration or penetration noted with consistencies presented. Patient with mild residue along base of tongue and in valleculae with solid trial.  Use of double swallow effective in clearing solid residuals.     Thin Liquids  · Pt essentially timely swallow initiation for age   · Adequate BOT retraction and epiglottic inversion patterns noted  · Adequate laryngeal elevation noted   · Pt with adequate airway protection without penetration or aspiration   · No residuals noted with thin liquids     Puree  · Pt essentially timely swallow initiation for age    · Adequate BOT retraction dn epiglottic inversion patterns noted  · Adequate laryngeal elevation noted  · Pt with adequate airway protection without penetration or aspiration   · No residuals noted with puree trials    Solid  · Timely initiation of swallow  · Pt with adequate airway protection without penetration or aspiration   · There was presence of valleculae and BOT residue   · Pt with sensory response and initiates double swallow w/o cue  · Strategies for double swallow were effective  in eliminating residuals       Cervical Esophageal Phase  Limited assessment 2/2 Patient positioning.  UES appeared to accommodate all bolus types without stasis or retrograde movement observed.     Impressions  Pt with oral and pharyngeal phases of swallow deemed WNL. Pt with decreased breath support and occasional mouth breathing t/o assessment.  Patient with mild residue along base of tongue and in valleculae with bites of solids.  Use of double swallows effective in clearing residue.   Pt appearing safe to continue regular diet with thin liquids; however, aspiration risk remains 2/2 breath support and Patient easily fatigues.     Prognosis/Plan/Education : Good. ST to f/u at the bedside 5x/week to monitor tolerance of diet and educate patient on aspiration precautions. Patient educated on findings and SLP POC. Findings and diet recommendations reviewed with nurse following study. Nurse explains she will review with MD.     Care Plan    SLP Goals        Problem: SLP Goal    Goal Priority Disciplines Outcome   SLP Goal     SLP Ongoing (interventions implemented as appropriate)   Description:  Speech Language Pathology Goals  Goals expected to be met by 9/1:  1. Pt will tolerate regular consistencies and thin liquids without overt s/s of aspiration, MOD I  2. Pt will participate in Modified Barium Swallow Study to r/o aspiration and determine safest PO diet. MET 8/25  3. Educate Patient on aspiration precautions and S/S aspiration                             G-Codes  Functional Assessment Tool Used: NOMS  Score: 6  Functional Limitations: Swallowing  Swallow Current Status (): ITALIA  Swallow Goal Status (): SILVIANO La, [facility]-SLP  Speech-Language Pathology  Pager: [phone]  8/25/2017

## 2017-08-25 NOTE — ASSESSMENT & PLAN NOTE
May increase insulin resistance.  On high dose SM taper. Last dose 8/25.  Prednisone taper beginning 8/26.

## 2017-08-25 NOTE — PLAN OF CARE
Problem: Patient Care Overview  Goal: Plan of Care Review  Outcome: Ongoing (interventions implemented as appropriate)  AAOX4.  VSS.  No complaints of pain.  Pt on 2L NC with SPO2 in the upper 90's.  Insulin drip currently infusing at 1.4 units/hr.  Accuchecks AC, HS, 0200.  0200 blood sugar was 114.  Family is at the bedside.  Voiding per urinal.  Self med box made.  Bed is in lowest position, call bell is in reach, and pt instructed to call nurse when needing assistance.  Will continue to monitor.

## 2017-08-25 NOTE — PROGRESS NOTES
[facility]  Lung Transplant  Progress Note - Floor    Patient Name: Martin Hendrix Jr.  MRN: [id number]  Admission Date: 8/21/2017  Hospital Length of Stay: 4 days  Post-Operative Day: 3  Attending Physician: Darrick Wolfe MD  Primary Care Provider: Sukhi Dunham Jr, MD     Subjective:     Interval History: No acute events overnight.  Has some dysphonia.    Continuous Infusions:   insulin (HUMAN R) infusion (adults) 1.4 Units/hr (08/25/17 0611)     Scheduled Meds:   aspirin  81 mg Oral Daily    [START ON 8/26/2017] basiliximab (SIMULECT) chemo infusion  20 mg Intravenous Once    ceFAZolin 1 g/50ml Dextrose IVPB  1 g Intravenous Q8H    chlorhexidine  15 mL Mouth/Throat BID    ciprofloxacin  400 mg Intravenous Q12H    enoxaparin  40 mg Subcutaneous Daily    famotidine  20 mg Oral BID    fluconazole  400 mg Oral Daily    insulin aspart  3-5 Units Subcutaneous TIDWM    levalbuterol  1.25 mg Nebulization Q8H    methylPREDNISolone (SOLU-Medrol) IVPB (doses > 250 mg)   Intravenous Daily    Followed by    [START ON 8/26/2017] predniSONE  100 mg Oral Daily    Followed by    [START ON 8/27/2017] predniSONE  70 mg Oral Daily    Followed by    [START ON 8/28/2017] predniSONE  50 mg Oral Daily    Followed by    [START ON 8/29/2017] predniSONE  40 mg Oral Daily    mycophenolate  500 mg Oral BID    sulfamethoxazole-trimethoprim 800-160mg  1 tablet Oral Every Mon, Wed, Fri    tacrolimus  1 mg Oral BID    valganciclovir  450 mg Oral BID     PRN Meds:acetaminophen, dextrose 50%, dextrose 50%, glucagon (human recombinant), glucose, glucose, HYDROmorphone, insulin aspart, magnesium sulfate IVPB **AND** magnesium sulfate IVPB, ondansetron, oxycodone-acetaminophen, potassium chloride 10% **AND** potassium chloride 10% **AND** potassium chloride 10%, promethazine (PHENERGAN) IVPB, tramadol    Review of patient's allergies indicates:  No Known Allergies    Review of Systems   Constitutional:  Negative for appetite change, chills, fatigue, fever and unexpected weight change.   HENT: Negative for congestion, ear pain, hearing loss, mouth sores and postnasal drip.    Eyes: Negative for photophobia, pain, discharge, redness, itching and visual disturbance.   Respiratory: Negative for cough, chest tightness and shortness of breath.    Cardiovascular: Positive for chest pain (at incision sites) and leg swelling. Negative for palpitations.   Gastrointestinal: Negative for abdominal distention, abdominal pain, blood in stool, constipation and diarrhea.   Endocrine: Negative for cold intolerance, heat intolerance, polydipsia, polyphagia and polyuria.   Genitourinary: Negative for decreased urine volume, difficulty urinating, dysuria, frequency, hematuria and urgency.   Musculoskeletal: Negative for arthralgias and joint swelling.   Allergic/Immunologic: Positive for immunocompromised state. Negative for environmental allergies and food allergies.   Neurological: Positive for weakness (due to debility). Negative for dizziness, tremors, syncope, speech difficulty and numbness.   Hematological: Negative for adenopathy. Does not bruise/bleed easily.   Psychiatric/Behavioral: Negative for agitation, confusion and hallucinations.     Objective:   Physical Exam   Constitutional: He is oriented to person, place, and time. He appears well-developed and well-nourished. No distress.   HENT:   Head: Normocephalic and atraumatic.   Nose: Nose normal.   Mouth/Throat: Oropharynx is clear and moist. No oropharyngeal exudate.   Eyes: Conjunctivae and EOM are normal. Pupils are equal, round, and reactive to light. No scleral icterus.   Neck: Normal range of motion. Neck supple. No JVD present. No tracheal deviation present. No thyromegaly present.   Cardiovascular: Normal rate, regular rhythm and normal heart sounds.    Pulmonary/Chest: Effort normal and breath sounds normal. No respiratory distress. He has no wheezes. He has no  rales. He exhibits no tenderness.   Chest tubes in place with serosanguinous output and no air leaks.   Abdominal: Soft. Bowel sounds are normal. He exhibits no distension. There is no tenderness.   Musculoskeletal: Normal range of motion. He exhibits edema (trace BLE). He exhibits no tenderness.   Neurological: He is alert and oriented to person, place, and time. No cranial nerve deficit.   Skin: Skin is warm and dry. He is not diaphoretic.   Psychiatric: He has a normal mood and affect. Thought content normal.         Vital Signs (Most Recent):  Temp: 97.7 °F (36.5 °C) (08/25/17 0800)  Pulse: 67 (08/25/17 0800)  Resp: 19 (08/25/17 0800)  BP: 135/74 (08/25/17 0400)  SpO2: (!) 94 % (08/25/17 0800) Vital Signs (24h Range):  Temp:  [97.5 °F (36.4 °C)-98.2 °F (36.8 °C)] 97.7 °F (36.5 °C)  Pulse:  [53-80] 67  Resp:  [0-40] 19  SpO2:  [94 %-100 %] 94 %  BP: ()/(51-82) 135/74     Weight: 106.2 kg (234 lb 2.1 oz)  Body mass index is 33.59 kg/m².      Intake/Output Summary (Last 24 hours) at 08/25/17 0945  Last data filed at 08/25/17 0800   Gross per 24 hour   Intake           5267.3 ml   Output             1470 ml   Net           3797.3 ml       Significant Labs:  CBC:    Recent Labs  Lab 08/25/17  0500   WBC 20.22*   RBC 3.21*   HGB 9.9*   HCT 28.6*      MCV 89   MCH 30.8   MCHC 34.6     BMP:    Recent Labs  Lab 08/25/17  0500   *   K 4.9   CL 97   CO2 24   BUN 67*   CREATININE 2.0*   CALCIUM 7.9*      Tacrolimus Levels:    Recent Labs  Lab 08/24/17  0500   TACROLIMUS <1.5*     Microbiology:  Microbiology Results (last 7 days)     Procedure Component Value Units Date/Time    AFB Culture & Smear [[id number]] Collected:  08/23/17 0957    Order Status:  Completed Specimen:  Respiratory from Bronchial Wash Updated:  08/24/17 2127     AFB Culture & Smear Culture in progress     AFB CULTURE STAIN No acid fast bacilli seen.    Narrative:       Bronchial Wash    Aerobic culture [[id number]]  (Susceptibility)  Collected:  08/22/17 0816    Order Status:  Completed Specimen:  Tissue from Lung, Lingula Updated:  08/24/17 1304     Aerobic Bacterial Culture --     STAPHYLOCOCCUS AUREUS  Few      Narrative:       Bronchus of donor lung    Culture, Respiratory [[id number]] Collected:  08/23/17 0957    Order Status:  Completed Specimen:  Respiratory from Bronchial Wash Updated:  08/24/17 0922     Respiratory Culture --     STAPHYLOCOCCUS AUREUS  Moderate  Susceptibility pending  Normal respiratory mary also present       Gram Stain (Respiratory) Few WBC's     Gram Stain (Respiratory) No organisms seen    Narrative:       Bronchial Wash    Culture, Anaerobe [[id number]] Collected:  08/22/17 0814    Order Status:  Completed Specimen:  Tissue from Lung, Lingula Updated:  08/24/17 0915     Anaerobic Culture Culture in progress    Narrative:       Bronchus of donor lung    Fungus culture [[id number]] Collected:  08/22/17 0815    Order Status:  Completed Specimen:  Tissue from Lung, Lingula Updated:  08/23/17 1358     Fungus (Mycology) Culture Culture in progress    Narrative:       Bronchus of donor lung    AFB Culture & Smear [[id number]] Collected:  08/22/17 0814    Order Status:  Completed Specimen:  Tissue from Lung, Lingula Updated:  08/23/17 1354     AFB Culture & Smear Culture in progress     AFB CULTURE STAIN No acid fast bacilli seen.    Narrative:       Bronchus of donor lung    Fungus culture [[id number]] Collected:  08/23/17 0957    Order Status:  Sent Specimen:  Respiratory from Bronchial Wash Updated:  08/23/17 1241    Urine culture [[id number]] Collected:  08/21/17 2027    Order Status:  Completed Specimen:  Urine from Urine, Clean Catch Updated:  08/22/17 2333     Urine Culture, Routine No growth    Gram stain [[id number]] Collected:  08/22/17 0814    Order Status:  Completed Specimen:  Tissue from Lung, Lingula Updated:  08/22/17 0948     Gram Stain Result No WBC's      No organisms seen    Narrative:       Bronchus of  donor lung          I have reviewed all pertinent labs within the past 24 hours.    Diagnostic Results:  X-Ray: Reviewed   There is postoperative change, cardiomegaly, moderate edema, and no change.      Assessment/Plan:     * Lung transplanted    There is no evidence of graft dysfunction. Currently on 2L NC.  Will wean oxygen as tolerated.  Continue CPT, incentive spirometer, and bronchodilators. Continue to monitor chest tube output which is adequate.          Immunosuppression    Solu-Medrol administered in the OR. Basiliximab in the ICU. Continue tacrolimus, mycophenolate, and corticoteroid taper         Prophylactic antibiotic    Bactrim, valganciclovir, and diflucan for opportunistic infection prophylaxis. Continue ciprofloxacin; and Ancef for MSSA in donor.          Leukocytosis    Expected after transplant. Will continue to monitor.         Hyperglycemia    Appreciate input from endocrinology        Hypotension    Resolved.  Continue to monitor.          Atrial fibrillation    Currently in NSR.  Amiodarone stopped         Dysphonia    ENT consulted.  SLP consulted--MBSS today.             Irwin Celestin NP  Lung Transplant  [facility]

## 2017-08-25 NOTE — PROGRESS NOTES
Pt transported to radiology per stretcher for MBSS.  Chest tubes to water seal. O2 @2L NC.  Telemetry maintained

## 2017-08-25 NOTE — NURSING TRANSFER
Nursing Transfer Note      8/24/2017     Transfer To: TSU room 8095    Transfer via wheelchair    Transfer with/ to O2, cardiac monitoring    Transported by Trupti Wolf, RN; [facility], PCT    Medicines sent: IV insulin; insulin pen    Chart send with patient: Yes    Notified: daughter    Patient reassessed at: 0730, 08/24/2017     Upon arrival to floor: patient oriented to room, call bell in reach and bed in lowest position

## 2017-08-26 LAB
ALBUMIN SERPL BCP-MCNC: 2.7 G/DL
ALP SERPL-CCNC: 53 U/L
ALT SERPL W/O P-5'-P-CCNC: 32 U/L
ANION GAP SERPL CALC-SCNC: 10 MMOL/L
AST SERPL-CCNC: 26 U/L
BASOPHILS # BLD AUTO: 0 K/UL
BASOPHILS NFR BLD: 0 %
BILIRUB SERPL-MCNC: 0.3 MG/DL
BUN SERPL-MCNC: 74 MG/DL
CALCIUM SERPL-MCNC: 7.9 MG/DL
CHLORIDE SERPL-SCNC: 98 MMOL/L
CO2 SERPL-SCNC: 21 MMOL/L
CREAT SERPL-MCNC: 1.9 MG/DL
DIFFERENTIAL METHOD: ABNORMAL
EOSINOPHIL # BLD AUTO: 0 K/UL
EOSINOPHIL NFR BLD: 0 %
ERYTHROCYTE [DISTWIDTH] IN BLOOD BY AUTOMATED COUNT: 13.5 %
EST. GFR  (AFRICAN AMERICAN): 45.2 ML/MIN/1.73 M^2
EST. GFR  (NON AFRICAN AMERICAN): 39.1 ML/MIN/1.73 M^2
GLUCOSE SERPL-MCNC: 186 MG/DL
HCT VFR BLD AUTO: 30.3 %
HGB BLD-MCNC: 10.3 G/DL
LYMPHOCYTES # BLD AUTO: 0.4 K/UL
LYMPHOCYTES NFR BLD: 3.3 %
MAGNESIUM SERPL-MCNC: 2.8 MG/DL
MCH RBC QN AUTO: 30.4 PG
MCHC RBC AUTO-ENTMCNC: 34 G/DL
MCV RBC AUTO: 89 FL
MONOCYTES # BLD AUTO: 0.7 K/UL
MONOCYTES NFR BLD: 5.3 %
NEUTROPHILS # BLD AUTO: 11.6 K/UL
NEUTROPHILS NFR BLD: 91.1 %
PLATELET # BLD AUTO: 202 K/UL
PMV BLD AUTO: 9.7 FL
POCT GLUCOSE: 153 MG/DL (ref 70–110)
POCT GLUCOSE: 163 MG/DL (ref 70–110)
POCT GLUCOSE: 171 MG/DL (ref 70–110)
POCT GLUCOSE: 189 MG/DL (ref 70–110)
POCT GLUCOSE: 216 MG/DL (ref 70–110)
POTASSIUM SERPL-SCNC: 5.2 MMOL/L
PROT SERPL-MCNC: 5.5 G/DL
RBC # BLD AUTO: 3.39 M/UL
SODIUM SERPL-SCNC: 129 MMOL/L
TACROLIMUS BLD-MCNC: 5.1 NG/ML
WBC # BLD AUTO: 12.76 K/UL

## 2017-08-26 PROCEDURE — 20600001 HC STEP DOWN PRIVATE ROOM

## 2017-08-26 PROCEDURE — 63600175 PHARM REV CODE 636 W HCPCS: Performed by: INTERNAL MEDICINE

## 2017-08-26 PROCEDURE — 97116 GAIT TRAINING THERAPY: CPT

## 2017-08-26 PROCEDURE — 94668 MNPJ CHEST WALL SBSQ: CPT

## 2017-08-26 PROCEDURE — 85025 COMPLETE CBC W/AUTO DIFF WBC: CPT

## 2017-08-26 PROCEDURE — 99232 SBSQ HOSP IP/OBS MODERATE 35: CPT | Mod: ,,, | Performed by: INTERNAL MEDICINE

## 2017-08-26 PROCEDURE — 27000221 HC OXYGEN, UP TO 24 HOURS

## 2017-08-26 PROCEDURE — 94640 AIRWAY INHALATION TREATMENT: CPT

## 2017-08-26 PROCEDURE — 25000003 PHARM REV CODE 250: Performed by: NURSE PRACTITIONER

## 2017-08-26 PROCEDURE — 25000003 PHARM REV CODE 250: Performed by: INTERNAL MEDICINE

## 2017-08-26 PROCEDURE — 83735 ASSAY OF MAGNESIUM: CPT

## 2017-08-26 PROCEDURE — 99900035 HC TECH TIME PER 15 MIN (STAT)

## 2017-08-26 PROCEDURE — 80053 COMPREHEN METABOLIC PANEL: CPT

## 2017-08-26 PROCEDURE — 80197 ASSAY OF TACROLIMUS: CPT

## 2017-08-26 PROCEDURE — 25000242 PHARM REV CODE 250 ALT 637 W/ HCPCS: Performed by: INTERNAL MEDICINE

## 2017-08-26 PROCEDURE — 25000242 PHARM REV CODE 250 ALT 637 W/ HCPCS: Performed by: NURSE PRACTITIONER

## 2017-08-26 PROCEDURE — 63600175 PHARM REV CODE 636 W HCPCS: Performed by: NURSE PRACTITIONER

## 2017-08-26 PROCEDURE — 36415 COLL VENOUS BLD VENIPUNCTURE: CPT

## 2017-08-26 RX ORDER — INSULIN ASPART 100 [IU]/ML
2 INJECTION, SOLUTION INTRAVENOUS; SUBCUTANEOUS
Status: ACTIVE | OUTPATIENT
Start: 2017-08-26 | End: 2017-08-27

## 2017-08-26 RX ORDER — INSULIN ASPART 100 [IU]/ML
2 INJECTION, SOLUTION INTRAVENOUS; SUBCUTANEOUS
Status: DISCONTINUED | OUTPATIENT
Start: 2017-08-26 | End: 2017-08-28

## 2017-08-26 RX ORDER — GUAIFENESIN 600 MG/1
600 TABLET, EXTENDED RELEASE ORAL 2 TIMES DAILY
Status: DISCONTINUED | OUTPATIENT
Start: 2017-08-26 | End: 2017-09-01 | Stop reason: HOSPADM

## 2017-08-26 RX ADMIN — CIPROFLOXACIN 400 MG: 2 INJECTION, SOLUTION INTRAVENOUS at 04:08

## 2017-08-26 RX ADMIN — ASPIRIN 81 MG CHEWABLE TABLET 81 MG: 81 TABLET CHEWABLE at 08:08

## 2017-08-26 RX ADMIN — CEFAZOLIN SODIUM 1 G: 1 SOLUTION INTRAVENOUS at 08:08

## 2017-08-26 RX ADMIN — INSULIN ASPART 1 UNITS: 100 INJECTION, SOLUTION INTRAVENOUS; SUBCUTANEOUS at 05:08

## 2017-08-26 RX ADMIN — TACROLIMUS 1 MG: 1 CAPSULE ORAL at 05:08

## 2017-08-26 RX ADMIN — OXYCODONE HYDROCHLORIDE AND ACETAMINOPHEN 1 TABLET: 7.5; 325 TABLET ORAL at 06:08

## 2017-08-26 RX ADMIN — GUAIFENESIN 600 MG: 600 TABLET, EXTENDED RELEASE ORAL at 12:08

## 2017-08-26 RX ADMIN — GUAIFENESIN 600 MG: 600 TABLET, EXTENDED RELEASE ORAL at 09:08

## 2017-08-26 RX ADMIN — VALGANCICLOVIR 450 MG: 450 TABLET, FILM COATED ORAL at 08:08

## 2017-08-26 RX ADMIN — LEVALBUTEROL 1.25 MG: 1.25 SOLUTION, CONCENTRATE RESPIRATORY (INHALATION) at 03:08

## 2017-08-26 RX ADMIN — DORNASE ALFA 2.5 MG: 1 SOLUTION RESPIRATORY (INHALATION) at 09:08

## 2017-08-26 RX ADMIN — LEVALBUTEROL 1.25 MG: 1.25 SOLUTION, CONCENTRATE RESPIRATORY (INHALATION) at 12:08

## 2017-08-26 RX ADMIN — CEFAZOLIN SODIUM 1 G: 1 SOLUTION INTRAVENOUS at 05:08

## 2017-08-26 RX ADMIN — LEVALBUTEROL 1.25 MG: 1.25 SOLUTION, CONCENTRATE RESPIRATORY (INHALATION) at 08:08

## 2017-08-26 RX ADMIN — VALGANCICLOVIR 450 MG: 450 TABLET, FILM COATED ORAL at 09:08

## 2017-08-26 RX ADMIN — INSULIN ASPART 2 UNITS: 100 INJECTION, SOLUTION INTRAVENOUS; SUBCUTANEOUS at 05:08

## 2017-08-26 RX ADMIN — OXYCODONE HYDROCHLORIDE AND ACETAMINOPHEN 1 TABLET: 7.5; 325 TABLET ORAL at 07:08

## 2017-08-26 RX ADMIN — TACROLIMUS 1 MG: 1 CAPSULE ORAL at 08:08

## 2017-08-26 RX ADMIN — DORNASE ALFA 2.5 MG: 1 SOLUTION RESPIRATORY (INHALATION) at 03:08

## 2017-08-26 RX ADMIN — FAMOTIDINE 20 MG: 20 TABLET, FILM COATED ORAL at 08:08

## 2017-08-26 RX ADMIN — CEFAZOLIN SODIUM 1 G: 1 SOLUTION INTRAVENOUS at 12:08

## 2017-08-26 RX ADMIN — PREDNISONE 100 MG: 20 TABLET ORAL at 08:08

## 2017-08-26 RX ADMIN — FAMOTIDINE 20 MG: 20 TABLET, FILM COATED ORAL at 09:08

## 2017-08-26 RX ADMIN — CHLORHEXIDINE GLUCONATE 15 ML: 1.2 RINSE ORAL at 09:08

## 2017-08-26 RX ADMIN — MYCOPHENOLATE MOFETIL 500 MG: 250 CAPSULE ORAL at 08:08

## 2017-08-26 RX ADMIN — FLUCONAZOLE 400 MG: 200 TABLET ORAL at 08:08

## 2017-08-26 RX ADMIN — INSULIN ASPART 4 UNITS: 100 INJECTION, SOLUTION INTRAVENOUS; SUBCUTANEOUS at 08:08

## 2017-08-26 RX ADMIN — INSULIN ASPART 2 UNITS: 100 INJECTION, SOLUTION INTRAVENOUS; SUBCUTANEOUS at 12:08

## 2017-08-26 RX ADMIN — CHLORHEXIDINE GLUCONATE 15 ML: 1.2 RINSE ORAL at 08:08

## 2017-08-26 RX ADMIN — SODIUM CHLORIDE 20 MG: 9 INJECTION, SOLUTION INTRAVENOUS at 08:08

## 2017-08-26 RX ADMIN — CIPROFLOXACIN 400 MG: 2 INJECTION, SOLUTION INTRAVENOUS at 02:08

## 2017-08-26 RX ADMIN — ENOXAPARIN SODIUM 40 MG: 100 INJECTION SUBCUTANEOUS at 04:08

## 2017-08-26 RX ADMIN — MYCOPHENOLATE MOFETIL 500 MG: 250 CAPSULE ORAL at 09:08

## 2017-08-26 NOTE — PROGRESS NOTES
BG reasonably controlled with slight excursion at dinner. No hypogycemia.  Tapering steroid dosing: last SM dose 8/25, now on prednisone taper.   Tolerating 25-50% diet.  Change Novolog to 2U if pt eats 50% or more of meal.   Anticipate AC/HS with low dose correction only tomorrow.

## 2017-08-26 NOTE — PLAN OF CARE
Problem: Physical Therapy Goal  Goal: Physical Therapy Goal  Goals to be met by: 17     Patient will increase functional independence with mobility by performin. Supine to sit with Stand-by Assistance  2. Sit to supine with Stand-by Assistance  3. Sit to stand transfer with Stand-by Assistance - Met   Updated: Sit to stand transfer with Mod I.  4. Bed to chair transfer with Stand-by Assistance - Met   Updated: Bed to chair transfer with Mod I.  5. Gait  x 150 feet with Stand-by Assistance with rest breaks as needed.    6. Lower extremity exercise program x 15 reps per handout, with independence  7. Patient to recall 3/3 sternal precautions without verbal cues.     Outcome: Ongoing (interventions implemented as appropriate)  Goals assessed and updated to reflect progress. Remain appropriate to improve functional mobility.    Kiran Meza III, DPT  2017

## 2017-08-26 NOTE — PT/OT/SLP PROGRESS
Physical Therapy  Treatment    Martin Hendrix Jr.   MRN: [id number]   Admitting Diagnosis: Lung transplanted    PT Received On: 08/26/17  PT Start Time: 1010     PT Stop Time: 1033    PT Total Time (min): 23 min       Billable Minutes:  Gait [id number]    Treatment Type: Treatment                General Precautions: Standard, aspiration, fall, sternal  Orthopedic Precautions: N/A   Braces: N/A    Do you have any cultural, spiritual, Religion conflicts, given your current situation?: none noted    Subjective:  Communicated with RN prior to session.  Patient agreeable to PT session.    Pain/Comfort  Pain Rating 1: 0/10  Pain Rating Post-Intervention 1: 0/10    Objective:   Patient found with: chest tube, oxygen, telemetry    Functional Mobility:  Bed Mobility:   Rolling/Turning to Left:  (not observed patient up in bedside chair upon room entry)    Transfers:  Sit <> Stand Assistance: Stand By Assistance (x3)  Sit <> Stand Assistive Device: No Assistive Device  Bed <> Chair Technique: Stand Pivot  Bed <> Chair Assistance: Stand By Assistance (x2)  Bed <> Chair Assistive Device: No Assistive Device    Gait:   Gait Distance: 20ft x2  Assistance 1: Stand by Assistance  Gait Assistive Device: No device  Gait Pattern: swing-through gait  Gait Deviation(s): decreased bernadette, foot flat, decreased stride length, decreased step length    Balance:   Static Sit: NORMAL: No deviations seen in posture held statically  Dynamic Sit: NORMAL: No deviations seen in posture held dynamically  Static Stand: NORMAL: No deviations seen in posture held statically  Dynamic stand: FAIR+: Needs CLOSE SUPERVISION during gait and is able to right self with minor LOB     Therapeutic Activities and Exercises:  Patient agreeable to PT session.    Bed mobility not assessed secondary to patient seated in bedside chair upon room entry.  Sternal precautions reinforced. Verbalized/demonstrates understanding. Needs reinforcement.    Sit<>stand x 3 SBA.  Able to maintain sternal precautions   Stand pivot x2 SBA. Able to maintain sternal precautions  Controlled descent with sitting CGA    Gait 20ftx2 SBA no device or LOB noted.  Patient with Moderate SOB during ambulation and requested to return to seated position.    Educated on deep breathing techniques using nasal cannula. Demonstrates understanding. Needs reinforcement.    White board updated: safe to transfer with RN staff    AM-PAC 6 CLICK MOBILITY  How much help from another person does this patient currently need?   1 = Unable, Total/Dependent Assistance  2 = A lot, Maximum/Moderate Assistance  3 = A little, Minimum/Contact Guard/Supervision  4 = None, Modified [place]/Independent    Turning over in bed (including adjusting bedclothes, sheets and blankets)?: 3  Sitting down on and standing up from a chair with arms (e.g., wheelchair, bedside commode, etc.): 3  Moving from lying on back to sitting on the side of the bed?: 3  Moving to and from a bed to a chair (including a wheelchair)?: 3  Need to walk in hospital room?: 3  Climbing 3-5 steps with a railing?: 2  Total Score: 17    AM-PAC Raw Score CMS G-Code Modifier Level of Impairment Assistance   6 % Total / Unable   7 - 9 CM 80 - 100% Maximal Assist   10 - 14 CL 60 - 80% Moderate Assist   15 - 19 CK 40 - 60% Moderate Assist   20 - 22 CJ 20 - 40% Minimal Assist   23 CI 1-20% SBA / CGA   24 CH 0% Independent/ Mod I     Patient left up in chair with all lines intact, call button in reach and RN notified. Mother present.    Assessment:  Martin Olivaresfatemeh Rosales is a 56 y.o. male with a medical diagnosis of Lung transplanted and presents with impaired endurance, generalized weakness, orthopedic precautions, and impaired cardiopulmonary response to activity limiting functional mobility. Educated on sternal precautions. Verbalized/demonstrates understanding; however, to benefit from reinforcement. Sit<>stand x 3 with SBA no device. Able to maintain  sternal precautions. Gait 20ftx2 SBA no device or LOB noted with moderate SOB on 2L O2. To benefit from continued skilled intervention to address deficits prior to transition home with HHPT to improve safety and overall functional mobility.    Rehab identified problem list/impairments: Rehab identified problem list/impairments: impaired endurance, weakness, impaired functional mobilty, impaired cardiopulmonary response to activity, impaired skin, orthopedic precautions    Rehab potential is good.    Activity tolerance: Good    Discharge recommendations: Discharge Facility/Level Of Care Needs: home health PT     Barriers to discharge: Barriers to Discharge: Decreased caregiver support    Equipment recommendations: Equipment Needed After Discharge:  (TBD)     GOALS:    Physical Therapy Goals        Problem: Physical Therapy Goal    Goal Priority Disciplines Outcome Goal Variances Interventions   Physical Therapy Goal     PT/OT, PT Ongoing (interventions implemented as appropriate)     Description:  Goals to be met by: 17     Patient will increase functional independence with mobility by performin. Supine to sit with Stand-by Assistance  2. Sit to supine with Stand-by Assistance  3. Sit to stand transfer with Stand-by Assistance - Met   Updated: Sit to stand transfer with Mod I.  4. Bed to chair transfer with Stand-by Assistance - Met   Updated: Bed to chair transfer with Mod I.  5. Gait  x 150 feet with Stand-by Assistance with rest breaks as needed.    6. Lower extremity exercise program x 15 reps per handout, with independence  7. Patient to recall 3/3 sternal precautions without verbal cues.                       PLAN:    Patient to be seen 4 x/week  to address the above listed problems via gait training, therapeutic activities, therapeutic exercises  Plan of Care expires: 17  Plan of Care reviewed with: patient, mother         Kiran PEGGY Susana XIONG, PT  2017

## 2017-08-26 NOTE — PLAN OF CARE
Problem: Patient Care Overview  Goal: Plan of Care Review  Outcome: Ongoing (interventions implemented as appropriate)  -AAOx4  -right forearm 22g infusing Ancef, dressing CDI  -RIJ was pulled today. Occlusive dressing in place. Pt educated about after care. Will continue to monitor.  -self meds pulled by daughter 100%  -2L NC O2 sat 94-96%  -Swallow study was performed due to vocal cords being paralyzed after extubation. Pt passed with no signs of aspiration, but its on aspiration precautions. HOB up at 45 degrees and no straws.  -ENT consulted for vocal cords.  -Telemetry in place, NSR  -Blood glucose checks ACHS. 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   -Pt educated about fall risk and verbalizes understanding.  -Pt wears non-slip socks when out of bed  -Bed in low locked position, call light within reach, bed rails up x2  -Pt is free from falls/injursing so far during shift. Will continue to monitor.    Problem: Infection, Risk/Actual (Adult)  Goal: Identify Related Risk Factors and Signs and Symptoms  Related risk factors and signs and symptoms are identified upon initiation of Human Response Clinical Practice Guideline (CPG)   Outcome: Ongoing (interventions implemented as appropriate)  -Pt afebrile with Tmax of 97.7  -Pt receiving prophylactic abx.     Problem: Lung Surgery (via Thoracotomy) (Adult)  Goal: Signs and Symptoms of Listed Potential Problems Will be Absent, Minimized or Managed (Lung Surgery)  Signs and symptoms of listed potential problems will be absent, minimized or  managed by discharge/transition of care (reference Lung Surgery (via Thoracotomy) (Adult) CPG).   Outcome: Ongoing (interventions implemented as appropriate)  -Midsternal incision with steri-strips. CDI. Edges approximated. Scant amount of dried drainage.   -Mediastinal chest tube to water seal draining sanguineous fluid. No signs of subq emphysema. Occlusive gauze dressing in place CDI. Will continue to monitor.  -Left pleural chest tube to water seal draining sanguineous fluid. No signs of subq emphysema. Occlusive gauze dressing in place CDI. Will continue to monitor.    Problem: Pressure Ulcer Risk (Santhosh Scale) (Adult,Obstetrics,Pediatric)  Goal: Skin Integrity  Patient will demonstrate the desired outcomes by discharge/transition of care.   Outcome: Ongoing (interventions implemented as appropriate)  -Pt positions himself independently

## 2017-08-27 PROBLEM — E87.5 HYPERKALEMIA: Status: ACTIVE | Noted: 2017-08-27

## 2017-08-27 PROBLEM — E87.1 HYPONATREMIA: Status: ACTIVE | Noted: 2017-08-27

## 2017-08-27 PROBLEM — N17.9 AKI (ACUTE KIDNEY INJURY): Status: ACTIVE | Noted: 2017-08-27

## 2017-08-27 LAB
ALBUMIN SERPL BCP-MCNC: 2.6 G/DL
ALP SERPL-CCNC: 52 U/L
ALT SERPL W/O P-5'-P-CCNC: 25 U/L
ANION GAP SERPL CALC-SCNC: 8 MMOL/L
AST SERPL-CCNC: 21 U/L
BASOPHILS # BLD AUTO: 0 K/UL
BASOPHILS NFR BLD: 0 %
BILIRUB SERPL-MCNC: 0.6 MG/DL
BUN SERPL-MCNC: 75 MG/DL
CALCIUM SERPL-MCNC: 8.3 MG/DL
CHLORIDE SERPL-SCNC: 97 MMOL/L
CO2 SERPL-SCNC: 24 MMOL/L
CREAT SERPL-MCNC: 1.7 MG/DL
DIFFERENTIAL METHOD: ABNORMAL
EOSINOPHIL # BLD AUTO: 0 K/UL
EOSINOPHIL NFR BLD: 0 %
ERYTHROCYTE [DISTWIDTH] IN BLOOD BY AUTOMATED COUNT: 13.6 %
EST. GFR  (AFRICAN AMERICAN): 51.7 ML/MIN/1.73 M^2
EST. GFR  (NON AFRICAN AMERICAN): 44.7 ML/MIN/1.73 M^2
GLUCOSE SERPL-MCNC: 155 MG/DL
HCT VFR BLD AUTO: 30 %
HGB BLD-MCNC: 10.5 G/DL
LYMPHOCYTES # BLD AUTO: 0.3 K/UL
LYMPHOCYTES NFR BLD: 2.2 %
MAGNESIUM SERPL-MCNC: 2.6 MG/DL
MCH RBC QN AUTO: 30.1 PG
MCHC RBC AUTO-ENTMCNC: 35 G/DL
MCV RBC AUTO: 86 FL
MONOCYTES # BLD AUTO: 1.1 K/UL
MONOCYTES NFR BLD: 7.7 %
NEUTROPHILS # BLD AUTO: 13.1 K/UL
NEUTROPHILS NFR BLD: 89.8 %
PLATELET # BLD AUTO: 254 K/UL
PMV BLD AUTO: 9.1 FL
POCT GLUCOSE: 121 MG/DL (ref 70–110)
POCT GLUCOSE: 176 MG/DL (ref 70–110)
POCT GLUCOSE: 177 MG/DL (ref 70–110)
POCT GLUCOSE: 195 MG/DL (ref 70–110)
POTASSIUM SERPL-SCNC: 5.6 MMOL/L
PROT SERPL-MCNC: 5.6 G/DL
RBC # BLD AUTO: 3.49 M/UL
SODIUM SERPL-SCNC: 129 MMOL/L
TACROLIMUS BLD-MCNC: 5.4 NG/ML
WBC # BLD AUTO: 14.59 K/UL

## 2017-08-27 PROCEDURE — 25000242 PHARM REV CODE 250 ALT 637 W/ HCPCS: Performed by: NURSE PRACTITIONER

## 2017-08-27 PROCEDURE — 25000003 PHARM REV CODE 250: Performed by: INTERNAL MEDICINE

## 2017-08-27 PROCEDURE — 27000221 HC OXYGEN, UP TO 24 HOURS

## 2017-08-27 PROCEDURE — 99232 SBSQ HOSP IP/OBS MODERATE 35: CPT | Mod: ,,, | Performed by: INTERNAL MEDICINE

## 2017-08-27 PROCEDURE — 20600001 HC STEP DOWN PRIVATE ROOM

## 2017-08-27 PROCEDURE — 80197 ASSAY OF TACROLIMUS: CPT

## 2017-08-27 PROCEDURE — 63600175 PHARM REV CODE 636 W HCPCS: Performed by: INTERNAL MEDICINE

## 2017-08-27 PROCEDURE — 85025 COMPLETE CBC W/AUTO DIFF WBC: CPT

## 2017-08-27 PROCEDURE — 99900035 HC TECH TIME PER 15 MIN (STAT)

## 2017-08-27 PROCEDURE — 97535 SELF CARE MNGMENT TRAINING: CPT

## 2017-08-27 PROCEDURE — 83735 ASSAY OF MAGNESIUM: CPT

## 2017-08-27 PROCEDURE — 63600175 PHARM REV CODE 636 W HCPCS: Performed by: NURSE PRACTITIONER

## 2017-08-27 PROCEDURE — 99232 SBSQ HOSP IP/OBS MODERATE 35: CPT | Mod: ,,, | Performed by: NURSE PRACTITIONER

## 2017-08-27 PROCEDURE — 80053 COMPREHEN METABOLIC PANEL: CPT

## 2017-08-27 PROCEDURE — 25000003 PHARM REV CODE 250: Performed by: NURSE PRACTITIONER

## 2017-08-27 PROCEDURE — 94761 N-INVAS EAR/PLS OXIMETRY MLT: CPT

## 2017-08-27 PROCEDURE — 94668 MNPJ CHEST WALL SBSQ: CPT

## 2017-08-27 PROCEDURE — 94640 AIRWAY INHALATION TREATMENT: CPT

## 2017-08-27 PROCEDURE — 36415 COLL VENOUS BLD VENIPUNCTURE: CPT

## 2017-08-27 PROCEDURE — 25000242 PHARM REV CODE 250 ALT 637 W/ HCPCS: Performed by: INTERNAL MEDICINE

## 2017-08-27 RX ADMIN — CHLORHEXIDINE GLUCONATE 15 ML: 1.2 RINSE ORAL at 09:08

## 2017-08-27 RX ADMIN — LEVALBUTEROL 1.25 MG: 1.25 SOLUTION, CONCENTRATE RESPIRATORY (INHALATION) at 10:08

## 2017-08-27 RX ADMIN — CHLORHEXIDINE GLUCONATE 15 ML: 1.2 RINSE ORAL at 08:08

## 2017-08-27 RX ADMIN — FAMOTIDINE 20 MG: 20 TABLET, FILM COATED ORAL at 09:08

## 2017-08-27 RX ADMIN — CEFAZOLIN SODIUM 1 G: 1 SOLUTION INTRAVENOUS at 05:08

## 2017-08-27 RX ADMIN — TACROLIMUS 1 MG: 1 CAPSULE ORAL at 05:08

## 2017-08-27 RX ADMIN — INSULIN ASPART 2 UNITS: 100 INJECTION, SOLUTION INTRAVENOUS; SUBCUTANEOUS at 12:08

## 2017-08-27 RX ADMIN — GUAIFENESIN 600 MG: 600 TABLET, EXTENDED RELEASE ORAL at 08:08

## 2017-08-27 RX ADMIN — VALGANCICLOVIR 450 MG: 450 TABLET, FILM COATED ORAL at 09:08

## 2017-08-27 RX ADMIN — ASPIRIN 81 MG CHEWABLE TABLET 81 MG: 81 TABLET CHEWABLE at 08:08

## 2017-08-27 RX ADMIN — OXYCODONE HYDROCHLORIDE AND ACETAMINOPHEN 1 TABLET: 7.5; 325 TABLET ORAL at 09:08

## 2017-08-27 RX ADMIN — VALGANCICLOVIR 450 MG: 450 TABLET, FILM COATED ORAL at 08:08

## 2017-08-27 RX ADMIN — FAMOTIDINE 20 MG: 20 TABLET, FILM COATED ORAL at 08:08

## 2017-08-27 RX ADMIN — INSULIN ASPART 1 UNITS: 100 INJECTION, SOLUTION INTRAVENOUS; SUBCUTANEOUS at 04:08

## 2017-08-27 RX ADMIN — CIPROFLOXACIN 400 MG: 2 INJECTION, SOLUTION INTRAVENOUS at 02:08

## 2017-08-27 RX ADMIN — MYCOPHENOLATE MOFETIL 500 MG: 250 CAPSULE ORAL at 08:08

## 2017-08-27 RX ADMIN — PREDNISONE 70 MG: 20 TABLET ORAL at 08:08

## 2017-08-27 RX ADMIN — CEFAZOLIN SODIUM 1 G: 1 SOLUTION INTRAVENOUS at 08:08

## 2017-08-27 RX ADMIN — DORNASE ALFA 2.5 MG: 1 SOLUTION RESPIRATORY (INHALATION) at 10:08

## 2017-08-27 RX ADMIN — MYCOPHENOLATE MOFETIL 500 MG: 250 CAPSULE ORAL at 09:08

## 2017-08-27 RX ADMIN — INSULIN ASPART 2 UNITS: 100 INJECTION, SOLUTION INTRAVENOUS; SUBCUTANEOUS at 08:08

## 2017-08-27 RX ADMIN — DORNASE ALFA 2.5 MG: 1 SOLUTION RESPIRATORY (INHALATION) at 07:08

## 2017-08-27 RX ADMIN — INSULIN ASPART 2 UNITS: 100 INJECTION, SOLUTION INTRAVENOUS; SUBCUTANEOUS at 04:08

## 2017-08-27 RX ADMIN — LEVALBUTEROL 1.25 MG: 1.25 SOLUTION, CONCENTRATE RESPIRATORY (INHALATION) at 03:08

## 2017-08-27 RX ADMIN — LEVALBUTEROL 1.25 MG: 1.25 SOLUTION, CONCENTRATE RESPIRATORY (INHALATION) at 01:08

## 2017-08-27 RX ADMIN — CEFAZOLIN SODIUM 1 G: 1 SOLUTION INTRAVENOUS at 12:08

## 2017-08-27 RX ADMIN — GUAIFENESIN 600 MG: 600 TABLET, EXTENDED RELEASE ORAL at 09:08

## 2017-08-27 RX ADMIN — TACROLIMUS 1 MG: 1 CAPSULE ORAL at 08:08

## 2017-08-27 RX ADMIN — FLUCONAZOLE 400 MG: 200 TABLET ORAL at 08:08

## 2017-08-27 RX ADMIN — ENOXAPARIN SODIUM 40 MG: 100 INJECTION SUBCUTANEOUS at 04:08

## 2017-08-27 NOTE — ASSESSMENT & PLAN NOTE
There is no evidence of graft dysfunction. Currently on 2L NC.  Will wean oxygen as tolerated.  Continue CPT, incentive spirometer, and bronchodilators. Continue to monitor chest tube output which is adequate. His exam suggested mucus plugging in the right side so will add guaifenesin and dornase to help with clearance of secretions.

## 2017-08-27 NOTE — PROGRESS NOTES
[facility]  Lung Transplant  Progress Note - Floor    Patient Name: Martin Hendrix Jr.  MRN: [id number]  Admission Date: 8/21/2017  Hospital Length of Stay: 6 days  Post-Operative Day: 5  Attending Physician: Darrick Wolfe MD  Primary Care Provider: Sukhi Dunham Jr, MD     Subjective:     Interval History:     No acute events overnight. Was able to clear some secretions yesterday with guaifenesin and dornase.     Continuous Infusions:     Scheduled Meds:   aspirin  81 mg Oral Daily    ceFAZolin 1 g/50ml Dextrose IVPB  1 g Intravenous Q8H    chlorhexidine  15 mL Mouth/Throat BID    ciprofloxacin  400 mg Intravenous Q12H    dornase alpha  2.5 mg Inhalation BID    enoxaparin  40 mg Subcutaneous Daily    famotidine  20 mg Oral BID    fluconazole  400 mg Oral Daily    guaifenesin  600 mg Oral BID    insulin aspart  2 Units Subcutaneous TIDWM    levalbuterol  1.25 mg Nebulization Q8H    mycophenolate  500 mg Oral BID    [START ON 8/28/2017] predniSONE  50 mg Oral Daily    Followed by    [START ON 8/29/2017] predniSONE  40 mg Oral Daily    sulfamethoxazole-trimethoprim 800-160mg  1 tablet Oral Every Mon, Wed, Fri    tacrolimus  1 mg Oral BID    valganciclovir  450 mg Oral BID     PRN Meds:acetaminophen, dextrose 50%, dextrose 50%, glucagon (human recombinant), glucose, glucose, HYDROmorphone, insulin aspart, magnesium sulfate IVPB **AND** magnesium sulfate IVPB, ondansetron, oxycodone-acetaminophen, potassium chloride 10% **AND** potassium chloride 10% **AND** potassium chloride 10%, promethazine (PHENERGAN) IVPB, tramadol    Review of patient's allergies indicates:  No Known Allergies    Review of Systems   Constitutional: Negative for appetite change, chills, fatigue, fever and unexpected weight change.   HENT: Positive for voice change. Negative for congestion, ear pain, hearing loss, mouth sores and postnasal drip.    Eyes: Negative for photophobia, pain, discharge, redness,  itching and visual disturbance.   Respiratory: Negative for cough, chest tightness and shortness of breath.    Cardiovascular: Positive for chest pain (at incision sites) and leg swelling. Negative for palpitations.   Gastrointestinal: Negative for abdominal distention, abdominal pain, blood in stool, constipation and diarrhea.   Endocrine: Negative for cold intolerance, heat intolerance, polydipsia, polyphagia and polyuria.   Genitourinary: Negative for decreased urine volume, difficulty urinating, dysuria, frequency, hematuria and urgency.   Musculoskeletal: Negative for arthralgias and joint swelling.   Allergic/Immunologic: Positive for immunocompromised state. Negative for environmental allergies and food allergies.   Neurological: Positive for weakness (due to debility). Negative for dizziness, tremors, syncope, speech difficulty and numbness.   Hematological: Negative for adenopathy. Does not bruise/bleed easily.   Psychiatric/Behavioral: Negative for agitation, confusion and hallucinations.     Objective:   Physical Exam   Constitutional: He is oriented to person, place, and time. He appears well-developed and well-nourished. No distress.   HENT:   Head: Normocephalic and atraumatic.   Nose: Nose normal.   Mouth/Throat: Oropharynx is clear and moist. No oropharyngeal exudate.   Eyes: Conjunctivae and EOM are normal. Pupils are equal, round, and reactive to light. No scleral icterus.   Neck: Normal range of motion. Neck supple. No JVD present. No tracheal deviation present. No thyromegaly present.   Cardiovascular: Normal rate, regular rhythm and normal heart sounds.    Pulmonary/Chest: Effort normal and breath sounds normal. No respiratory distress. He has no wheezes. He has no rales. He exhibits no tenderness.   Chest tubes in place with serosanguinous output and no air leaks.   Abdominal: Soft. Bowel sounds are normal. He exhibits no distension. There is no tenderness.   Musculoskeletal: Normal range of  motion. He exhibits edema (trace BLE). He exhibits no tenderness.   Neurological: He is alert and oriented to person, place, and time. No cranial nerve deficit.   Skin: Skin is warm and dry. He is not diaphoretic.   Psychiatric: He has a normal mood and affect. Thought content normal.         Vital Signs (Most Recent):  Temp: 98.2 °F (36.8 °C) (08/27/17 0800)  Pulse: 73 (08/27/17 1130)  Resp: 18 (08/27/17 1130)  BP: (!) 148/69 (08/27/17 1130)  SpO2: 96 % (08/27/17 1130) Vital Signs (24h Range):  Temp:  [97.6 °F (36.4 °C)-98.2 °F (36.8 °C)] 98.2 °F (36.8 °C)  Pulse:  [60-73] 73  Resp:  [16-18] 18  SpO2:  [92 %-100 %] 96 %  BP: (114-148)/(62-75) 148/69     Weight: 107.5 kg (236 lb 15.9 oz)  Body mass index is 34.01 kg/m².      Intake/Output Summary (Last 24 hours) at 08/27/17 1156  Last data filed at 08/27/17 0830   Gross per 24 hour   Intake             1820 ml   Output             1830 ml   Net              -10 ml       Significant Labs:  CBC:    Recent Labs  Lab 08/27/17  0632   WBC 14.59*   RBC 3.49*   HGB 10.5*   HCT 30.0*      MCV 86   MCH 30.1   MCHC 35.0     BMP:    Recent Labs  Lab 08/27/17  0632   *   K 5.6*   CL 97   CO2 24   BUN 75*   CREATININE 1.7*   CALCIUM 8.3*      Tacrolimus Levels:    Recent Labs  Lab 08/27/17  0632   TACROLIMUS 5.4     Microbiology:  Microbiology Results (last 7 days)     Procedure Component Value Units Date/Time    Culture, Respiratory [[id number]] Collected:  08/23/17 0992    Order Status:  Completed Specimen:  Respiratory from Bronchial Wash Updated:  08/25/17 1103     Respiratory Culture --     STAPHYLOCOCCUS AUREUS  Moderate  Normal respiratory mary also present       Gram Stain (Respiratory) Few WBC's     Gram Stain (Respiratory) No organisms seen    Narrative:       Bronchial Wash    AFB Culture & Smear [[id number]] Collected:  08/23/17 0957    Order Status:  Completed Specimen:  Respiratory from Bronchial Wash Updated:  08/24/17 2127     AFB Culture & Smear  Culture in progress     AFB CULTURE STAIN No acid fast bacilli seen.    Narrative:       Bronchial Wash    Aerobic culture [[id number]]  (Susceptibility) Collected:  08/22/17 0816    Order Status:  Completed Specimen:  Tissue from Lung, Lingula Updated:  08/24/17 1304     Aerobic Bacterial Culture --     STAPHYLOCOCCUS AUREUS  Few      Narrative:       Bronchus of donor lung    Culture, Anaerobe [[id number]] Collected:  08/22/17 0814    Order Status:  Completed Specimen:  Tissue from Lung, Lingula Updated:  08/24/17 0915     Anaerobic Culture Culture in progress    Narrative:       Bronchus of donor lung    Fungus culture [[id number]] Collected:  08/22/17 0815    Order Status:  Completed Specimen:  Tissue from Lung, Lingula Updated:  08/23/17 1358     Fungus (Mycology) Culture Culture in progress    Narrative:       Bronchus of donor lung    AFB Culture & Smear [[id number]] Collected:  08/22/17 0814    Order Status:  Completed Specimen:  Tissue from Lung, Lingula Updated:  08/23/17 1354     AFB Culture & Smear Culture in progress     AFB CULTURE STAIN No acid fast bacilli seen.    Narrative:       Bronchus of donor lung    Fungus culture [[id number]] Collected:  08/23/17 0957    Order Status:  Sent Specimen:  Respiratory from Bronchial Wash Updated:  08/23/17 1241    Urine culture [[id number]] Collected:  08/21/17 2027    Order Status:  Completed Specimen:  Urine from Urine, Clean Catch Updated:  08/22/17 2333     Urine Culture, Routine No growth    Gram stain [[id number]] Collected:  08/22/17 0814    Order Status:  Completed Specimen:  Tissue from Lung, Lingula Updated:  08/22/17 0948     Gram Stain Result No WBC's      No organisms seen    Narrative:       Bronchus of donor lung          I have reviewed all pertinent labs within the past 24 hours.    Diagnostic Results:  X-Ray: Reviewed   There is postoperative change, cardiomegaly, moderate edema, and no change.      Assessment/Plan:     * Lung transplanted    There is  no evidence of graft dysfunction. Currently on 2L NC.  Will wean oxygen as tolerated.  Continue CPT, incentive spirometer, and bronchodilators. Continue to monitor chest tube output which is adequate. His has improved today.        Hypotension    Resolved.  Continue to monitor.          Immunosuppression    Solu-Medrol administered in the OR. Basiliximab in the ICU. Continue tacrolimus, mycophenolate, and corticoteroid taper         Prophylactic antibiotic    Bactrim, valganciclovir, and diflucan for opportunistic infection prophylaxis. Continue ciprofloxacin; and Ancef for MSSA in donor.          Leukocytosis    Expected after transplant. Will continue to monitor.         Hyperglycemia    Appreciate input from endocrinology        Atrial fibrillation    Currently in NSR.  Amiodarone stopped         Dysphonia    Secondary to left vocal cord dysfunction. SLP cleared him for a regular diet. ENT following        AVILA (acute kidney injury)    Likely form ATN after surgery. His creatinine is improving and he is making good urine.        Hyponatremia    I suspect from AVILA, will continue to monitor.        Hyperkalemia    I suspect from AVILA, no interventions unless >6            Darrick Wolfe MD  Lung Transplant  [facility]

## 2017-08-27 NOTE — PROGRESS NOTES
"[facility]  Endocrinology  Progress Note    Admit Date: 8/21/2017     Reason for Consult: Management of  Hyperglycemia     Surgical Procedure and Date:  8/22/17 s/p bilateral lung transplant       HPI:  Mr. 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 He is now s/p bilateral lung transplant with steroid induced hyperglycemia. Endocrine consulted for bg management.       Interval HPI:   Overall, Bg controlled with one elevation at dinner. Basal insulin held; does not demonstrate basal needs per Am lab today. Prednisone continues to taper. Tolerating diet. Afebrile.     /75 (BP Location: Right arm, Patient Position: Sitting)   Pulse 65   Temp 97.6 °F (36.4 °C) (Oral)   Resp 18   Ht 5' 10" (1.778 m)   Wt 107.5 kg (236 lb 15.9 oz)   SpO2 95%   BMI 34.01 kg/m²       Labs Reviewed and Include      Recent Labs  Lab 08/27/17  0632   *   CALCIUM 8.3*   ALBUMIN 2.6*   PROT 5.6*   *   K 5.6*   CO2 24   CL 97   BUN 75*   CREATININE 1.7*   ALKPHOS 52*   ALT 25   AST 21   BILITOT 0.6     Lab Results   Component Value Date    WBC 14.59 (H) 08/27/2017    HGB 10.5 (L) 08/27/2017    HCT 30.0 (L) 08/27/2017    MCV 86 08/27/2017     08/27/2017     No results for input(s): TSH, FREET4 in the last 168 hours.  Lab Results   Component Value Date    HGBA1C 6.7 (H) 04/24/2017       Nutritional status:   Body mass index is 34.01 kg/m².  Lab Results   Component Value Date    ALBUMIN 2.6 (L) 08/27/2017    ALBUMIN 2.7 (L) 08/26/2017    ALBUMIN 2.6 (L) 08/25/2017     No results found for: PREALBUMIN    Estimated Creatinine Clearance: 61 mL/min (based on Cr of 1.7).    Accu-Checks  Recent Labs      08/24/17   1825  08/25/17   0234  08/25/17   0916  08/25/17   1445  08/25/17   1917  08/26/17   0039  08/26/17   0801  08/26/17   1212  08/26/17   1716  08/26/17   2308   POCTGLUCOSE  166*  114*  97  124*  189*  171*  163*  153*  216*  177*       Current " Medications and/or Treatments Impacting Glycemic Control  Immunotherapy:  Immunosuppressants         Stop Route Frequency     mycophenolate capsule 500 mg      -- Oral 2 times daily     tacrolimus capsule 1 mg      -- Oral 2 times daily        Steroids:   Hormones     Start     Stop Route Frequency Ordered    08/29/17 0900  predniSONE tablet 40 mg      -- Oral Daily 08/23/17 0808    08/28/17 0900  predniSONE tablet 50 mg      08/29 0859 Oral Daily 08/23/17 0808    08/27/17 0900  predniSONE tablet 70 mg      08/28 0859 Oral Daily 08/23/17 0808        Pressors:    Autonomic Drugs     None        Hyperglycemia/Diabetes Medications: Antihyperglycemics     Start     Stop Route Frequency Ordered    08/26/17 1130  insulin aspart pen 2 Units      -- SubQ 3 times daily with meals 08/26/17 0840    08/26/17 0845  insulin aspart pen 2 Units      08/27 0714 SubQ with breakfast 08/26/17 0843    08/24/17 1410  insulin aspart pen 0-4 Units      -- SubQ As needed (PRN) 08/24/17 1311          ASSESSMENT and PLAN    Hyperglycemia    Bg goal 140-180. Continue Novolog 2U if pt eats 50% or more of meal. BG Ac and HS with low dose correction.   Consider correction scale only in AM.    Discharge planning is ongoing.  Anticipate resolution of BG as steroids continue to taper.   Consider DPP4i with discharge if Mr. Hendrix continues to demonstrate prandial needs.         Sarcoidosis    S/p lung txp 8/22/17           * Lung transplanted    Per LUT          Immunosuppression    May increase insulin resistance.  On high dose SM taper. Last dose 8/25.  Prednisone taper beginning 8/26.          Non morbid obesity due to excess calories    May increase insulin resistance   Body mass index is 34.01 kg/m².                Jessica Roman, SARAHP  Endocrinology  [facility]

## 2017-08-27 NOTE — PLAN OF CARE
"Problem: Patient Care Overview  Goal: Plan of Care Review  Outcome: Ongoing (interventions implemented as appropriate)  -AAOx4  -right forearm 22g infusing Cipro, dressing CDI  -self meds were not pulled tonight because daughter did not show up. Pt stated "I am not ready to pull them myself." Nurse pulled night meds. Will continue to monitor.  -2L NC O2 sat %  -Swallow study was performed due to vocal cords being paralyzed after extubation. Pt passed with no signs of aspiration, but its on aspiration precautions. HOB up at 45 degrees and no straws.  -ENT consulted for vocal cords.  -Telemetry in place, NSR  -Blood glucose checks ACHS. Last , no SSI needed    Problem: Fall Risk (Adult)  Goal: Identify Related Risk Factors and Signs and Symptoms  Related risk factors and signs and symptoms are identified upon initiation of Human Response Clinical Practice Guideline (CPG)   Outcome: Ongoing (interventions implemented as appropriate)  -Pt has generalized weakness and is a 1 person assist.   -Pt educated about fall risk and verbalizes understanding.  -Pt wears non-slip socks when out of bed  -Bed in low locked position, call light within reach, bed rails up x2  -Pt is free from falls/injursing so far during shift. Will continue to monitor.    Problem: Infection, Risk/Actual (Adult)  Goal: Identify Related Risk Factors and Signs and Symptoms  Related risk factors and signs and symptoms are identified upon initiation of Human Response Clinical Practice Guideline (CPG)   Outcome: Ongoing (interventions implemented as appropriate)  -Pt afebrile with Tmax of 97.7  -Pt receiving prophylactic abx.    Problem: Lung Surgery (via Thoracotomy) (Adult)  Goal: Signs and Symptoms of Listed Potential Problems Will be Absent, Minimized or Managed (Lung Surgery)  Signs and symptoms of listed potential problems will be absent, minimized or managed by discharge/transition of care (reference Lung Surgery (via Thoracotomy) " (Adult) CPG).   Outcome: Ongoing (interventions implemented as appropriate)  -Midsternal incision with steri-strips. CDI. Edges approximated. No drainage.    -Mediastinal chest tube to water seal draining sanguineous fluid. No signs of subq emphysema. Occlusive gauze dressing in place CDI. Will continue to monitor.  -Left pleural chest tube to water seal draining sanguineous fluid. No signs of subq emphysema. Occlusive gauze dressing in place CDI. Will continue to monitor.       Problem: Pressure Ulcer Risk (Santhosh Scale) (Adult,Obstetrics,Pediatric)  Goal: Identify Related Risk Factors and Signs and Symptoms  Related risk factors and signs and symptoms are identified upon initiation of Human Response Clinical Practice Guideline (CPG)   Outcome: Ongoing (interventions implemented as appropriate)  -Pt positions himself independently  -Ambulates with assistance from bed to chair.

## 2017-08-27 NOTE — PT/OT/SLP PROGRESS
Occupational Therapy  Treatment    Martin Hendrix Jr.   MRN: [id number]   Admitting Diagnosis: Lung transplanted    OT Date of Treatment: 08/27/17   OT Start Time: 1133  OT Stop Time: 1211  OT Total Time (min): 38 min    Billable Minutes:  Self Care/Home Management 38    General Precautions: Standard, aspiration, nectar thick, sternal, fall (cardiac)    Subjective:  Communicated with RN prior to session.  Pt reported that it is hard for him to remember to not push up when getting out of a chair.  Pain/Comfort  Pain Rating 1: 0/10  Pain Rating Post-Intervention 1: 0/10    Objective:  Patient found with: chest tube, telemetry, oxygen     Functional Mobility:    Transfers:   Sit <> Stand Assistance:  (SBA-CGA from chair x 3 trials)  Sit <> Stand Assistive Device: No Assistive Device    Activities of Daily Living:     UE Dressing Level of Assistance: Stand by assistance (donning gown around back while seated in chair)  LE Dressing Level of Assistance: Minimum assistance (donning socks while seated in chair with (A) for positioning of LE due to increased edema in BLE)    Therapeutic Activities and Exercises:  Pt required SBA-CGA with static standing trials x 3 trials.  Pt performed verbal review of sternal precautions.  Provided education regarding sternal precautions and techniques for UE dressing, LE dressing, bed mobility, transfers, toileting, & bathing to maintain sternal precautions with pt verbalizing understanding.  Discussed bathing with pt & family & recommended that pt would benefit from bathtub bench to facilitate bathing due to decreased endurance & strength.  Recommended techniques to facilitate hygiene after toileting with pt verbalizing understanding.  Discussed different items & activities that pt would need to be careful of upon discharge home to facilitate following of sternal precautions.  Pt verbalized understanding of all education provided.  Pt required occasional rest breaks during activities due  "to SOB.  Pt had no further questions & when asked whether there were any concerns pt reported none.      AM-PAC 6 CLICK ADL   How much help from another person does this patient currently need?   1 = Unable, Total/Dependent Assistance  2 = A lot, Maximum/Moderate Assistance  3 = A little, Minimum/Contact Guard/Supervision  4 = None, Modified [place]/Independent    Putting on and taking off regular lower body clothing? : 3  Bathing (including washing, rinsing, drying)?: 3  Toileting, which includes using toilet, bedpan, or urinal? : 3  Putting on and taking off regular upper body clothing?: 3  Taking care of personal grooming such as brushing teeth?: 3  Eating meals?: 3  Total Score: 18     AM-PAC Raw Score CMS "G-Code Modifier Level of Impairment Assistance   6 % Total / Unable   7 - 8 CM 80 - 100% Maximal Assist   9-13 CL 60 - 80% Moderate Assist   14 - 19 CK 40 - 60% Moderate Assist   20 - 22 CJ 20 - 40% Minimal Assist   23 CI 1-20% SBA / CGA   24 CH 0% Independent/ Mod I       Patient left up in chair with all lines intact, call button in reach, RN notified, family members present and white board updated.    ASSESSMENT:  Martin Hendrix Jr. is a 56 y.o. male with a medical diagnosis of Lung transplanted and presents with fair participation and motivation.  Pt with good understanding of all instruction provided.  Pt continues to require (A) with all activities/ADL's.    Rehab identified problem list/impairments: Rehab identified problem list/impairments: weakness, impaired endurance, impaired self care skills, impaired functional mobilty, impaired balance, decreased coordination, decreased upper extremity function, decreased lower extremity function    Rehab potential is fair.    Activity tolerance: Fair    Discharge recommendations: Discharge Facility/Level Of Care Needs: home health OT     GOALS:    Occupational Therapy Goals        Problem: Occupational Therapy Goal    Goal Priority Disciplines " Outcome Interventions   Occupational Therapy Goal     OT, PT/OT Ongoing (interventions implemented as appropriate)    Description:  Goals to be met by: 9/1/2017     Patient will increase functional independence with ADLs by performing:    UE Dressing with Set-up Assistance.  LE Dressing with Stand by Assistance.  Grooming while standing with Stand by Assistance.  Toileting from toilet with Minimal Assistance for hygiene and clothing management.   Toilet transfer to toilet with Supervision.  Supine to sit with Stand by assistance.                       Plan:  Patient to be seen 5 x/week to address the above listed problems via self-care/home management, neuromuscular re-education, cognitive retraining, sensory integration, therapeutic activities, therapeutic exercises  Plan of Care expires: 09/25/17  Plan of Care reviewed with: patient, family         EDNA Cutler  08/27/2017

## 2017-08-27 NOTE — SUBJECTIVE & OBJECTIVE
Subjective:     Interval History: No acute events overnight.  Has some dysphonia.    Continuous Infusions:     Scheduled Meds:   aspirin  81 mg Oral Daily    ceFAZolin 1 g/50ml Dextrose IVPB  1 g Intravenous Q8H    chlorhexidine  15 mL Mouth/Throat BID    ciprofloxacin  400 mg Intravenous Q12H    dornase alpha  2.5 mg Inhalation BID    enoxaparin  40 mg Subcutaneous Daily    famotidine  20 mg Oral BID    fluconazole  400 mg Oral Daily    guaifenesin  600 mg Oral BID    insulin aspart  2 Units Subcutaneous TIDWM    insulin aspart  2 Units Subcutaneous with breakfast    levalbuterol  1.25 mg Nebulization Q8H    mycophenolate  500 mg Oral BID    [START ON 8/27/2017] predniSONE  70 mg Oral Daily    Followed by    [START ON 8/28/2017] predniSONE  50 mg Oral Daily    Followed by    [START ON 8/29/2017] predniSONE  40 mg Oral Daily    sulfamethoxazole-trimethoprim 800-160mg  1 tablet Oral Every Mon, Wed, Fri    tacrolimus  1 mg Oral BID    valganciclovir  450 mg Oral BID     PRN Meds:acetaminophen, dextrose 50%, dextrose 50%, glucagon (human recombinant), glucose, glucose, HYDROmorphone, insulin aspart, magnesium sulfate IVPB **AND** magnesium sulfate IVPB, ondansetron, oxycodone-acetaminophen, potassium chloride 10% **AND** potassium chloride 10% **AND** potassium chloride 10%, promethazine (PHENERGAN) IVPB, tramadol    Review of patient's allergies indicates:  No Known Allergies    Review of Systems   Constitutional: Negative for appetite change, chills, fatigue, fever and unexpected weight change.   HENT: Positive for voice change. Negative for congestion, ear pain, hearing loss, mouth sores and postnasal drip.    Eyes: Negative for photophobia, pain, discharge, redness, itching and visual disturbance.   Respiratory: Negative for cough, chest tightness and shortness of breath.         Feels he needs to cough but cannot get it up   Cardiovascular: Positive for chest pain (at incision sites) and leg  swelling. Negative for palpitations.   Gastrointestinal: Negative for abdominal distention, abdominal pain, blood in stool, constipation and diarrhea.   Endocrine: Negative for cold intolerance, heat intolerance, polydipsia, polyphagia and polyuria.   Genitourinary: Negative for decreased urine volume, difficulty urinating, dysuria, frequency, hematuria and urgency.   Musculoskeletal: Negative for arthralgias and joint swelling.   Allergic/Immunologic: Positive for immunocompromised state. Negative for environmental allergies and food allergies.   Neurological: Positive for weakness (due to debility). Negative for dizziness, tremors, syncope, speech difficulty and numbness.   Hematological: Negative for adenopathy. Does not bruise/bleed easily.   Psychiatric/Behavioral: Negative for agitation, confusion and hallucinations.     Objective:   Physical Exam   Constitutional: He is oriented to person, place, and time. He appears well-developed and well-nourished. No distress.   HENT:   Head: Normocephalic and atraumatic.   Nose: Nose normal.   Mouth/Throat: Oropharynx is clear and moist. No oropharyngeal exudate.   Eyes: Conjunctivae and EOM are normal. Pupils are equal, round, and reactive to light. No scleral icterus.   Neck: Normal range of motion. Neck supple. No JVD present. No tracheal deviation present. No thyromegaly present.   Cardiovascular: Normal rate, regular rhythm and normal heart sounds.    Pulmonary/Chest: Effort normal and breath sounds normal. No respiratory distress. He has no wheezes. He has no rales. He exhibits no tenderness.   Chest tubes in place with serosanguinous output and no air leaks.   Abdominal: Soft. Bowel sounds are normal. He exhibits no distension. There is no tenderness.   Musculoskeletal: Normal range of motion. He exhibits edema (trace BLE). He exhibits no tenderness.   Neurological: He is alert and oriented to person, place, and time. No cranial nerve deficit.   Skin: Skin is warm  and dry. He is not diaphoretic.   Psychiatric: He has a normal mood and affect. Thought content normal.         Vital Signs (Most Recent):  Temp: 98.2 °F (36.8 °C) (08/26/17 1643)  Pulse: 69 (08/26/17 1643)  Resp: 18 (08/26/17 1643)  BP: 116/62 (08/26/17 1643)  SpO2: 98 % (08/26/17 1643) Vital Signs (24h Range):  Temp:  [97.5 °F (36.4 °C)-98.2 °F (36.8 °C)] 98.2 °F (36.8 °C)  Pulse:  [65-73] 69  Resp:  [18] 18  SpO2:  [92 %-98 %] 98 %  BP: (116-139)/(62-75) 116/62     Weight: 106.9 kg (235 lb 10.8 oz)  Body mass index is 33.82 kg/m².      Intake/Output Summary (Last 24 hours) at 08/26/17 1939  Last data filed at 08/26/17 1716   Gross per 24 hour   Intake              890 ml   Output             2570 ml   Net            -1680 ml       Significant Labs:  CBC:    Recent Labs  Lab 08/26/17  0420   WBC 12.76*   RBC 3.39*   HGB 10.3*   HCT 30.3*      MCV 89   MCH 30.4   MCHC 34.0     BMP:    Recent Labs  Lab 08/26/17  0420   *   K 5.2*   CL 98   CO2 21*   BUN 74*   CREATININE 1.9*   CALCIUM 7.9*      Tacrolimus Levels:    Recent Labs  Lab 08/26/17  0420   TACROLIMUS 5.1     Microbiology:  Microbiology Results (last 7 days)     Procedure Component Value Units Date/Time    Culture, Respiratory [[id number]] Collected:  08/23/17 0957    Order Status:  Completed Specimen:  Respiratory from Bronchial Wash Updated:  08/25/17 1103     Respiratory Culture --     STAPHYLOCOCCUS AUREUS  Moderate  Normal respiratory mary also present       Gram Stain (Respiratory) Few WBC's     Gram Stain (Respiratory) No organisms seen    Narrative:       Bronchial Wash    AFB Culture & Smear [[id number]] Collected:  08/23/17 0957    Order Status:  Completed Specimen:  Respiratory from Bronchial Wash Updated:  08/24/17 2127     AFB Culture & Smear Culture in progress     AFB CULTURE STAIN No acid fast bacilli seen.    Narrative:       Bronchial Wash    Aerobic culture [[id number]]  (Susceptibility) Collected:  08/22/17 0816    Order  Status:  Completed Specimen:  Tissue from Lung, Lingula Updated:  08/24/17 1304     Aerobic Bacterial Culture --     STAPHYLOCOCCUS AUREUS  Few      Narrative:       Bronchus of donor lung    Culture, Anaerobe [[id number]] Collected:  08/22/17 0814    Order Status:  Completed Specimen:  Tissue from Lung, Lingula Updated:  08/24/17 0915     Anaerobic Culture Culture in progress    Narrative:       Bronchus of donor lung    Fungus culture [[id number]] Collected:  08/22/17 0815    Order Status:  Completed Specimen:  Tissue from Lung, Lingula Updated:  08/23/17 1358     Fungus (Mycology) Culture Culture in progress    Narrative:       Bronchus of donor lung    AFB Culture & Smear [[id number]] Collected:  08/22/17 0814    Order Status:  Completed Specimen:  Tissue from Lung, Lingula Updated:  08/23/17 1354     AFB Culture & Smear Culture in progress     AFB CULTURE STAIN No acid fast bacilli seen.    Narrative:       Bronchus of donor lung    Fungus culture [[id number]] Collected:  08/23/17 0957    Order Status:  Sent Specimen:  Respiratory from Bronchial Wash Updated:  08/23/17 1241    Urine culture [[id number]] Collected:  08/21/17 2027    Order Status:  Completed Specimen:  Urine from Urine, Clean Catch Updated:  08/22/17 2333     Urine Culture, Routine No growth    Gram stain [[id number]] Collected:  08/22/17 0814    Order Status:  Completed Specimen:  Tissue from Lung, Lingula Updated:  08/22/17 0948     Gram Stain Result No WBC's      No organisms seen    Narrative:       Bronchus of donor lung          I have reviewed all pertinent labs within the past 24 hours.    Diagnostic Results:  X-Ray: Reviewed   There is postoperative change, cardiomegaly, moderate edema, and no change.

## 2017-08-27 NOTE — ASSESSMENT & PLAN NOTE
There is no evidence of graft dysfunction. Currently on 2L NC.  Will wean oxygen as tolerated.  Continue CPT, incentive spirometer, and bronchodilators. Continue to monitor chest tube output which is adequate. His has improved today.

## 2017-08-27 NOTE — PLAN OF CARE
Problem: Occupational Therapy Goal  Goal: Occupational Therapy Goal  Goals to be met by: 9/1/2017     Patient will increase functional independence with ADLs by performing:    UE Dressing with Set-up Assistance.  LE Dressing with Stand by Assistance.  Grooming while standing with Stand by Assistance.  Toileting from toilet with Minimal Assistance for hygiene and clothing management.   Toilet transfer to toilet with Supervision.  Supine to sit with Stand by assistance.     Outcome: Ongoing (interventions implemented as appropriate)  Goals updated.

## 2017-08-27 NOTE — ASSESSMENT & PLAN NOTE
Bg goal 140-180. Continue Novolog 2U if pt eats 50% or more of meal. BG Ac and HS with low dose correction.   Consider correction scale only in AM.    Discharge planning is ongoing.  Anticipate resolution of BG as steroids continue to taper.   Consider DPP4i with discharge if Mr. Hendrix continues to demonstrate prandial needs.

## 2017-08-27 NOTE — PROGRESS NOTES
[facility]  Lung Transplant  Progress Note - Floor    Patient Name: Martin Hendrix Jr.  MRN: [id number]  Admission Date: 8/21/2017  Hospital Length of Stay: 5 days  Post-Operative Day: 4  Attending Physician: Darrick Wolfe MD  Primary Care Provider: Sukhi Dunham Jr, MD     Subjective:     Interval History: No acute events overnight.  Has some dysphonia.    Continuous Infusions:     Scheduled Meds:   aspirin  81 mg Oral Daily    ceFAZolin 1 g/50ml Dextrose IVPB  1 g Intravenous Q8H    chlorhexidine  15 mL Mouth/Throat BID    ciprofloxacin  400 mg Intravenous Q12H    dornase alpha  2.5 mg Inhalation BID    enoxaparin  40 mg Subcutaneous Daily    famotidine  20 mg Oral BID    fluconazole  400 mg Oral Daily    guaifenesin  600 mg Oral BID    insulin aspart  2 Units Subcutaneous TIDWM    insulin aspart  2 Units Subcutaneous with breakfast    levalbuterol  1.25 mg Nebulization Q8H    mycophenolate  500 mg Oral BID    [START ON 8/27/2017] predniSONE  70 mg Oral Daily    Followed by    [START ON 8/28/2017] predniSONE  50 mg Oral Daily    Followed by    [START ON 8/29/2017] predniSONE  40 mg Oral Daily    sulfamethoxazole-trimethoprim 800-160mg  1 tablet Oral Every Mon, Wed, Fri    tacrolimus  1 mg Oral BID    valganciclovir  450 mg Oral BID     PRN Meds:acetaminophen, dextrose 50%, dextrose 50%, glucagon (human recombinant), glucose, glucose, HYDROmorphone, insulin aspart, magnesium sulfate IVPB **AND** magnesium sulfate IVPB, ondansetron, oxycodone-acetaminophen, potassium chloride 10% **AND** potassium chloride 10% **AND** potassium chloride 10%, promethazine (PHENERGAN) IVPB, tramadol    Review of patient's allergies indicates:  No Known Allergies    Review of Systems   Constitutional: Negative for appetite change, chills, fatigue, fever and unexpected weight change.   HENT: Positive for voice change. Negative for congestion, ear pain, hearing loss, mouth sores and postnasal  drip.    Eyes: Negative for photophobia, pain, discharge, redness, itching and visual disturbance.   Respiratory: Negative for cough, chest tightness and shortness of breath.         Feels he needs to cough but cannot get it up   Cardiovascular: Positive for chest pain (at incision sites) and leg swelling. Negative for palpitations.   Gastrointestinal: Negative for abdominal distention, abdominal pain, blood in stool, constipation and diarrhea.   Endocrine: Negative for cold intolerance, heat intolerance, polydipsia, polyphagia and polyuria.   Genitourinary: Negative for decreased urine volume, difficulty urinating, dysuria, frequency, hematuria and urgency.   Musculoskeletal: Negative for arthralgias and joint swelling.   Allergic/Immunologic: Positive for immunocompromised state. Negative for environmental allergies and food allergies.   Neurological: Positive for weakness (due to debility). Negative for dizziness, tremors, syncope, speech difficulty and numbness.   Hematological: Negative for adenopathy. Does not bruise/bleed easily.   Psychiatric/Behavioral: Negative for agitation, confusion and hallucinations.     Objective:   Physical Exam   Constitutional: He is oriented to person, place, and time. He appears well-developed and well-nourished. No distress.   HENT:   Head: Normocephalic and atraumatic.   Nose: Nose normal.   Mouth/Throat: Oropharynx is clear and moist. No oropharyngeal exudate.   Eyes: Conjunctivae and EOM are normal. Pupils are equal, round, and reactive to light. No scleral icterus.   Neck: Normal range of motion. Neck supple. No JVD present. No tracheal deviation present. No thyromegaly present.   Cardiovascular: Normal rate, regular rhythm and normal heart sounds.    Pulmonary/Chest: Effort normal and breath sounds normal. No respiratory distress. He has no wheezes. He has no rales. He exhibits no tenderness.   Chest tubes in place with serosanguinous output and no air leaks.   Abdominal:  Soft. Bowel sounds are normal. He exhibits no distension. There is no tenderness.   Musculoskeletal: Normal range of motion. He exhibits edema (trace BLE). He exhibits no tenderness.   Neurological: He is alert and oriented to person, place, and time. No cranial nerve deficit.   Skin: Skin is warm and dry. He is not diaphoretic.   Psychiatric: He has a normal mood and affect. Thought content normal.         Vital Signs (Most Recent):  Temp: 98.2 °F (36.8 °C) (08/26/17 1643)  Pulse: 69 (08/26/17 1643)  Resp: 18 (08/26/17 1643)  BP: 116/62 (08/26/17 1643)  SpO2: 98 % (08/26/17 1643) Vital Signs (24h Range):  Temp:  [97.5 °F (36.4 °C)-98.2 °F (36.8 °C)] 98.2 °F (36.8 °C)  Pulse:  [65-73] 69  Resp:  [18] 18  SpO2:  [92 %-98 %] 98 %  BP: (116-139)/(62-75) 116/62     Weight: 106.9 kg (235 lb 10.8 oz)  Body mass index is 33.82 kg/m².      Intake/Output Summary (Last 24 hours) at 08/26/17 1939  Last data filed at 08/26/17 1716   Gross per 24 hour   Intake              890 ml   Output             2570 ml   Net            -1680 ml       Significant Labs:  CBC:    Recent Labs  Lab 08/26/17  0420   WBC 12.76*   RBC 3.39*   HGB 10.3*   HCT 30.3*      MCV 89   MCH 30.4   MCHC 34.0     BMP:    Recent Labs  Lab 08/26/17  0420   *   K 5.2*   CL 98   CO2 21*   BUN 74*   CREATININE 1.9*   CALCIUM 7.9*      Tacrolimus Levels:    Recent Labs  Lab 08/26/17  0420   TACROLIMUS 5.1     Microbiology:  Microbiology Results (last 7 days)     Procedure Component Value Units Date/Time    Culture, Respiratory [[id number]] Collected:  08/23/17 0981    Order Status:  Completed Specimen:  Respiratory from Bronchial Wash Updated:  08/25/17 1103     Respiratory Culture --     STAPHYLOCOCCUS AUREUS  Moderate  Normal respiratory mary also present       Gram Stain (Respiratory) Few WBC's     Gram Stain (Respiratory) No organisms seen    Narrative:       Bronchial Wash    AFB Culture & Smear [[id number]] Collected:  08/23/17 0957    Order  Status:  Completed Specimen:  Respiratory from Bronchial Wash Updated:  08/24/17 2127     AFB Culture & Smear Culture in progress     AFB CULTURE STAIN No acid fast bacilli seen.    Narrative:       Bronchial Wash    Aerobic culture [[id number]]  (Susceptibility) Collected:  08/22/17 0816    Order Status:  Completed Specimen:  Tissue from Lung, Lingula Updated:  08/24/17 1304     Aerobic Bacterial Culture --     STAPHYLOCOCCUS AUREUS  Few      Narrative:       Bronchus of donor lung    Culture, Anaerobe [[id number]] Collected:  08/22/17 0814    Order Status:  Completed Specimen:  Tissue from Lung, Lingula Updated:  08/24/17 0915     Anaerobic Culture Culture in progress    Narrative:       Bronchus of donor lung    Fungus culture [[id number]] Collected:  08/22/17 0815    Order Status:  Completed Specimen:  Tissue from Lung, Lingula Updated:  08/23/17 1358     Fungus (Mycology) Culture Culture in progress    Narrative:       Bronchus of donor lung    AFB Culture & Smear [[id number]] Collected:  08/22/17 0814    Order Status:  Completed Specimen:  Tissue from Lung, Lingula Updated:  08/23/17 1354     AFB Culture & Smear Culture in progress     AFB CULTURE STAIN No acid fast bacilli seen.    Narrative:       Bronchus of donor lung    Fungus culture [[id number]] Collected:  08/23/17 0957    Order Status:  Sent Specimen:  Respiratory from Bronchial Wash Updated:  08/23/17 1241    Urine culture [[id number]] Collected:  08/21/17 2027    Order Status:  Completed Specimen:  Urine from Urine, Clean Catch Updated:  08/22/17 2333     Urine Culture, Routine No growth    Gram stain [[id number]] Collected:  08/22/17 0814    Order Status:  Completed Specimen:  Tissue from Lung, Lingula Updated:  08/22/17 0948     Gram Stain Result No WBC's      No organisms seen    Narrative:       Bronchus of donor lung          I have reviewed all pertinent labs within the past 24 hours.    Diagnostic Results:  X-Ray: Reviewed   There is postoperative  change, cardiomegaly, moderate edema, and no change.      Assessment/Plan:     * Lung transplanted    There is no evidence of graft dysfunction. Currently on 2L NC.  Will wean oxygen as tolerated.  Continue CPT, incentive spirometer, and bronchodilators. Continue to monitor chest tube output which is adequate. His exam suggested mucus plugging in the right side so will add guaifenesin and dornase to help with clearance of secretions.         Hypotension    Resolved.  Continue to monitor.          Immunosuppression    Solu-Medrol administered in the OR. Basiliximab in the ICU. Continue tacrolimus, mycophenolate, and corticoteroid taper         Prophylactic antibiotic    Bactrim, valganciclovir, and diflucan for opportunistic infection prophylaxis. Continue ciprofloxacin; and Ancef for MSSA in donor.          Leukocytosis    Expected after transplant. Will continue to monitor.         Hyperglycemia    Appreciate input from endocrinology        Dysphonia    Secondary to left vocal cord dysfunction. SLP cleared him for a regular diet.         Atrial fibrillation    Currently in NSR.  Amiodarone stopped             Darrick Wolfe MD  Lung Transplant  [facility]

## 2017-08-27 NOTE — SUBJECTIVE & OBJECTIVE
"Interval HPI:   Overall, Bg controlled with one elevation at dinner. Basal insulin held; does not demonstrate basal needs per Am lab today. Prednisone continues to taper. Tolerating diet. Afebrile.     /75 (BP Location: Right arm, Patient Position: Sitting)   Pulse 65   Temp 97.6 °F (36.4 °C) (Oral)   Resp 18   Ht 5' 10" (1.778 m)   Wt 107.5 kg (236 lb 15.9 oz)   SpO2 95%   BMI 34.01 kg/m²     Labs Reviewed and Include      Recent Labs  Lab 08/27/17  0632   *   CALCIUM 8.3*   ALBUMIN 2.6*   PROT 5.6*   *   K 5.6*   CO2 24   CL 97   BUN 75*   CREATININE 1.7*   ALKPHOS 52*   ALT 25   AST 21   BILITOT 0.6     Lab Results   Component Value Date    WBC 14.59 (H) 08/27/2017    HGB 10.5 (L) 08/27/2017    HCT 30.0 (L) 08/27/2017    MCV 86 08/27/2017     08/27/2017     No results for input(s): TSH, FREET4 in the last 168 hours.  Lab Results   Component Value Date    HGBA1C 6.7 (H) 04/24/2017       Nutritional status:   Body mass index is 34.01 kg/m².  Lab Results   Component Value Date    ALBUMIN 2.6 (L) 08/27/2017    ALBUMIN 2.7 (L) 08/26/2017    ALBUMIN 2.6 (L) 08/25/2017     No results found for: PREALBUMIN    Estimated Creatinine Clearance: 61 mL/min (based on Cr of 1.7).    Accu-Checks  Recent Labs      08/24/17   1825  08/25/17   0234  08/25/17   0916  08/25/17   1445  08/25/17   1917  08/26/17   0039  08/26/17   0801  08/26/17   1212  08/26/17   1716  08/26/17   2308   POCTGLUCOSE  166*  114*  97  124*  189*  171*  163*  153*  216*  177*       Current Medications and/or Treatments Impacting Glycemic Control  Immunotherapy:  Immunosuppressants         Stop Route Frequency     mycophenolate capsule 500 mg      -- Oral 2 times daily     tacrolimus capsule 1 mg      -- Oral 2 times daily        Steroids:   Hormones     Start     Stop Route Frequency Ordered    08/29/17 0900  predniSONE tablet 40 mg      -- Oral Daily 08/23/17 0808 08/28/17 0900  predniSONE tablet 50 mg      08/29 0859 " Oral Daily 08/23/17 0808    08/27/17 0900  predniSONE tablet 70 mg      08/28 0859 Oral Daily 08/23/17 0808        Pressors:    Autonomic Drugs     None        Hyperglycemia/Diabetes Medications: Antihyperglycemics     Start     Stop Route Frequency Ordered    08/26/17 1130  insulin aspart pen 2 Units      -- SubQ 3 times daily with meals 08/26/17 0840    08/26/17 0845  insulin aspart pen 2 Units      08/27 0714 SubQ with breakfast 08/26/17 0843    08/24/17 1410  insulin aspart pen 0-4 Units      -- SubQ As needed (PRN) 08/24/17 1311

## 2017-08-27 NOTE — SUBJECTIVE & OBJECTIVE
Subjective:     Interval History:     No acute events overnight. Was able to clear some secretions yesterday with guaifenesin and dornase.     Continuous Infusions:     Scheduled Meds:   aspirin  81 mg Oral Daily    ceFAZolin 1 g/50ml Dextrose IVPB  1 g Intravenous Q8H    chlorhexidine  15 mL Mouth/Throat BID    ciprofloxacin  400 mg Intravenous Q12H    dornase alpha  2.5 mg Inhalation BID    enoxaparin  40 mg Subcutaneous Daily    famotidine  20 mg Oral BID    fluconazole  400 mg Oral Daily    guaifenesin  600 mg Oral BID    insulin aspart  2 Units Subcutaneous TIDWM    levalbuterol  1.25 mg Nebulization Q8H    mycophenolate  500 mg Oral BID    [START ON 8/28/2017] predniSONE  50 mg Oral Daily    Followed by    [START ON 8/29/2017] predniSONE  40 mg Oral Daily    sulfamethoxazole-trimethoprim 800-160mg  1 tablet Oral Every Mon, Wed, Fri    tacrolimus  1 mg Oral BID    valganciclovir  450 mg Oral BID     PRN Meds:acetaminophen, dextrose 50%, dextrose 50%, glucagon (human recombinant), glucose, glucose, HYDROmorphone, insulin aspart, magnesium sulfate IVPB **AND** magnesium sulfate IVPB, ondansetron, oxycodone-acetaminophen, potassium chloride 10% **AND** potassium chloride 10% **AND** potassium chloride 10%, promethazine (PHENERGAN) IVPB, tramadol    Review of patient's allergies indicates:  No Known Allergies    Review of Systems   Constitutional: Negative for appetite change, chills, fatigue, fever and unexpected weight change.   HENT: Positive for voice change. Negative for congestion, ear pain, hearing loss, mouth sores and postnasal drip.    Eyes: Negative for photophobia, pain, discharge, redness, itching and visual disturbance.   Respiratory: Negative for cough, chest tightness and shortness of breath.    Cardiovascular: Positive for chest pain (at incision sites) and leg swelling. Negative for palpitations.   Gastrointestinal: Negative for abdominal distention, abdominal pain, blood in  stool, constipation and diarrhea.   Endocrine: Negative for cold intolerance, heat intolerance, polydipsia, polyphagia and polyuria.   Genitourinary: Negative for decreased urine volume, difficulty urinating, dysuria, frequency, hematuria and urgency.   Musculoskeletal: Negative for arthralgias and joint swelling.   Allergic/Immunologic: Positive for immunocompromised state. Negative for environmental allergies and food allergies.   Neurological: Positive for weakness (due to debility). Negative for dizziness, tremors, syncope, speech difficulty and numbness.   Hematological: Negative for adenopathy. Does not bruise/bleed easily.   Psychiatric/Behavioral: Negative for agitation, confusion and hallucinations.     Objective:   Physical Exam   Constitutional: He is oriented to person, place, and time. He appears well-developed and well-nourished. No distress.   HENT:   Head: Normocephalic and atraumatic.   Nose: Nose normal.   Mouth/Throat: Oropharynx is clear and moist. No oropharyngeal exudate.   Eyes: Conjunctivae and EOM are normal. Pupils are equal, round, and reactive to light. No scleral icterus.   Neck: Normal range of motion. Neck supple. No JVD present. No tracheal deviation present. No thyromegaly present.   Cardiovascular: Normal rate, regular rhythm and normal heart sounds.    Pulmonary/Chest: Effort normal and breath sounds normal. No respiratory distress. He has no wheezes. He has no rales. He exhibits no tenderness.   Chest tubes in place with serosanguinous output and no air leaks.   Abdominal: Soft. Bowel sounds are normal. He exhibits no distension. There is no tenderness.   Musculoskeletal: Normal range of motion. He exhibits edema (trace BLE). He exhibits no tenderness.   Neurological: He is alert and oriented to person, place, and time. No cranial nerve deficit.   Skin: Skin is warm and dry. He is not diaphoretic.   Psychiatric: He has a normal mood and affect. Thought content normal.          Vital Signs (Most Recent):  Temp: 98.2 °F (36.8 °C) (08/27/17 0800)  Pulse: 73 (08/27/17 1130)  Resp: 18 (08/27/17 1130)  BP: (!) 148/69 (08/27/17 1130)  SpO2: 96 % (08/27/17 1130) Vital Signs (24h Range):  Temp:  [97.6 °F (36.4 °C)-98.2 °F (36.8 °C)] 98.2 °F (36.8 °C)  Pulse:  [60-73] 73  Resp:  [16-18] 18  SpO2:  [92 %-100 %] 96 %  BP: (114-148)/(62-75) 148/69     Weight: 107.5 kg (236 lb 15.9 oz)  Body mass index is 34.01 kg/m².      Intake/Output Summary (Last 24 hours) at 08/27/17 1156  Last data filed at 08/27/17 0830   Gross per 24 hour   Intake             1820 ml   Output             1830 ml   Net              -10 ml       Significant Labs:  CBC:    Recent Labs  Lab 08/27/17  0632   WBC 14.59*   RBC 3.49*   HGB 10.5*   HCT 30.0*      MCV 86   MCH 30.1   MCHC 35.0     BMP:    Recent Labs  Lab 08/27/17  0632   *   K 5.6*   CL 97   CO2 24   BUN 75*   CREATININE 1.7*   CALCIUM 8.3*      Tacrolimus Levels:    Recent Labs  Lab 08/27/17  0632   TACROLIMUS 5.4     Microbiology:  Microbiology Results (last 7 days)     Procedure Component Value Units Date/Time    Culture, Respiratory [[id number]] Collected:  08/23/17 0957    Order Status:  Completed Specimen:  Respiratory from Bronchial Wash Updated:  08/25/17 1103     Respiratory Culture --     STAPHYLOCOCCUS AUREUS  Moderate  Normal respiratory mary also present       Gram Stain (Respiratory) Few WBC's     Gram Stain (Respiratory) No organisms seen    Narrative:       Bronchial Wash    AFB Culture & Smear [[id number]] Collected:  08/23/17 0957    Order Status:  Completed Specimen:  Respiratory from Bronchial Wash Updated:  08/24/17 2127     AFB Culture & Smear Culture in progress     AFB CULTURE STAIN No acid fast bacilli seen.    Narrative:       Bronchial Wash    Aerobic culture [[id number]]  (Susceptibility) Collected:  08/22/17 0816    Order Status:  Completed Specimen:  Tissue from Lung, Lingula Updated:  08/24/17 1304     Aerobic Bacterial  Culture --     STAPHYLOCOCCUS AUREUS  Few      Narrative:       Bronchus of donor lung    Culture, Anaerobe [[id number]] Collected:  08/22/17 0814    Order Status:  Completed Specimen:  Tissue from Lung, Lingula Updated:  08/24/17 0915     Anaerobic Culture Culture in progress    Narrative:       Bronchus of donor lung    Fungus culture [[id number]] Collected:  08/22/17 0815    Order Status:  Completed Specimen:  Tissue from Lung, Lingula Updated:  08/23/17 1358     Fungus (Mycology) Culture Culture in progress    Narrative:       Bronchus of donor lung    AFB Culture & Smear [[id number]] Collected:  08/22/17 0814    Order Status:  Completed Specimen:  Tissue from Lung, Lingula Updated:  08/23/17 1354     AFB Culture & Smear Culture in progress     AFB CULTURE STAIN No acid fast bacilli seen.    Narrative:       Bronchus of donor lung    Fungus culture [[id number]] Collected:  08/23/17 0957    Order Status:  Sent Specimen:  Respiratory from Bronchial Wash Updated:  08/23/17 1241    Urine culture [[id number]] Collected:  08/21/17 2027    Order Status:  Completed Specimen:  Urine from Urine, Clean Catch Updated:  08/22/17 2333     Urine Culture, Routine No growth    Gram stain [[id number]] Collected:  08/22/17 0814    Order Status:  Completed Specimen:  Tissue from Lung, Lingula Updated:  08/22/17 0948     Gram Stain Result No WBC's      No organisms seen    Narrative:       Bronchus of donor lung          I have reviewed all pertinent labs within the past 24 hours.    Diagnostic Results:  X-Ray: Reviewed   There is postoperative change, cardiomegaly, moderate edema, and no change.

## 2017-08-28 PROBLEM — I95.9 HYPOTENSION: Status: RESOLVED | Noted: 2017-08-22 | Resolved: 2017-08-28

## 2017-08-28 PROBLEM — I48.0 PAROXYSMAL ATRIAL FIBRILLATION: Status: ACTIVE | Noted: 2017-08-23

## 2017-08-28 LAB
ALBUMIN SERPL BCP-MCNC: 2.4 G/DL
ALP SERPL-CCNC: 45 U/L
ALT SERPL W/O P-5'-P-CCNC: 18 U/L
ANION GAP SERPL CALC-SCNC: 4 MMOL/L
AST SERPL-CCNC: 17 U/L
BACTERIA SPEC ANAEROBE CULT: NORMAL
BASOPHILS # BLD AUTO: 0 K/UL
BASOPHILS NFR BLD: 0 %
BILIRUB SERPL-MCNC: 0.5 MG/DL
BUN SERPL-MCNC: 64 MG/DL
CALCIUM SERPL-MCNC: 7.6 MG/DL
CHLORIDE SERPL-SCNC: 98 MMOL/L
CO2 SERPL-SCNC: 29 MMOL/L
CREAT SERPL-MCNC: 1.5 MG/DL
DIFFERENTIAL METHOD: ABNORMAL
ENTEROVIRUS: NOT DETECTED
EOSINOPHIL # BLD AUTO: 0 K/UL
EOSINOPHIL NFR BLD: 0 %
ERYTHROCYTE [DISTWIDTH] IN BLOOD BY AUTOMATED COUNT: 13.6 %
EST. GFR  (AFRICAN AMERICAN): >60 ML/MIN/1.73 M^2
EST. GFR  (NON AFRICAN AMERICAN): 52 ML/MIN/1.73 M^2
GLUCOSE SERPL-MCNC: 163 MG/DL
HCT VFR BLD AUTO: 27.8 %
HGB BLD-MCNC: 9.5 G/DL
HUMAN BOCAVIRUS: NOT DETECTED
HUMAN CORONAVIRUS, COMMON COLD VIRUS: NOT DETECTED
INFLUENZA A - H1N1-09: NOT DETECTED
LEGIONELLA PNEUMOPHILA: NOT DETECTED
LYMPHOCYTES # BLD AUTO: 0.5 K/UL
LYMPHOCYTES NFR BLD: 4.1 %
MAGNESIUM SERPL-MCNC: 2.7 MG/DL
MCH RBC QN AUTO: 30.4 PG
MCHC RBC AUTO-ENTMCNC: 34.2 G/DL
MCV RBC AUTO: 89 FL
MONOCYTES # BLD AUTO: 1.1 K/UL
MONOCYTES NFR BLD: 9.6 %
NEUTROPHILS # BLD AUTO: 10 K/UL
NEUTROPHILS NFR BLD: 86 %
PARAINFLUENZA: NOT DETECTED
PLATELET # BLD AUTO: 250 K/UL
PMV BLD AUTO: 9 FL
POCT GLUCOSE: 128 MG/DL (ref 70–110)
POCT GLUCOSE: 143 MG/DL (ref 70–110)
POCT GLUCOSE: 163 MG/DL (ref 70–110)
POCT GLUCOSE: 87 MG/DL (ref 70–110)
POTASSIUM SERPL-SCNC: 5.7 MMOL/L
PROT SERPL-MCNC: 4.9 G/DL
RBC # BLD AUTO: 3.13 M/UL
RVP - ADENOVIRUS: NOT DETECTED
RVP - HUMAN METAPNEUMOVIRUS (HMPV): NOT DETECTED
RVP - INFLUENZA A: NOT DETECTED
RVP - INFLUENZA B: NOT DETECTED
RVP - RESPIRATORY SYNCTIAL VIRUS (RSV) A: NOT DETECTED
RVP - RESPIRATORY VIRAL PANEL, SOURCE: ABNORMAL
RVP - RHINOVIRUS: NOT DETECTED
SODIUM SERPL-SCNC: 131 MMOL/L
TACROLIMUS BLD-MCNC: 5.2 NG/ML
TEM - ACINETOBACTER BAUMANNII: NOT DETECTED
TEM - BORDETELLA PERTUSSIS: NOT DETECTED
TEM - CHLAMYDOPHILA PNEUMONIAE: NOT DETECTED
TEM - KLEBSIELLA PNEUMONIAE: NOT DETECTED
TEM - MRSA: POSITIVE
TEM - MYCOPLASMA PNEUMONIAE: NOT DETECTED
TEM - NEISSERIA MENINGITIDIS: NOT DETECTED
TEM - PANTON-VALENTINE: NOT DETECTED
TEM - PSEUDOMONAS AERUGINOSA: NOT DETECTED
TEM - STAPHYLOCOCCUS AUREUS: NOT DETECTED
TEM - STREPTOCOCCUS PNEUMONIAE: NOT DETECTED
TEM - STREPTOCOCCUS PYOGENES A: NOT DETECTED
TEM- HAEMOPHILUS INFLUENZAE B: NOT DETECTED
TEM- HAEMOPHILUS INFLUENZAE: POSITIVE
WBC # BLD AUTO: 11.65 K/UL

## 2017-08-28 PROCEDURE — 25000003 PHARM REV CODE 250: Performed by: INTERNAL MEDICINE

## 2017-08-28 PROCEDURE — 25000242 PHARM REV CODE 250 ALT 637 W/ HCPCS: Performed by: INTERNAL MEDICINE

## 2017-08-28 PROCEDURE — 20600001 HC STEP DOWN PRIVATE ROOM

## 2017-08-28 PROCEDURE — 97530 THERAPEUTIC ACTIVITIES: CPT

## 2017-08-28 PROCEDURE — 80197 ASSAY OF TACROLIMUS: CPT

## 2017-08-28 PROCEDURE — 99232 SBSQ HOSP IP/OBS MODERATE 35: CPT | Mod: ,,, | Performed by: NURSE PRACTITIONER

## 2017-08-28 PROCEDURE — 99900035 HC TECH TIME PER 15 MIN (STAT)

## 2017-08-28 PROCEDURE — 92526 ORAL FUNCTION THERAPY: CPT

## 2017-08-28 PROCEDURE — 36415 COLL VENOUS BLD VENIPUNCTURE: CPT

## 2017-08-28 PROCEDURE — 94640 AIRWAY INHALATION TREATMENT: CPT

## 2017-08-28 PROCEDURE — 25000003 PHARM REV CODE 250: Performed by: NURSE PRACTITIONER

## 2017-08-28 PROCEDURE — 94667 MNPJ CHEST WALL 1ST: CPT

## 2017-08-28 PROCEDURE — 63600175 PHARM REV CODE 636 W HCPCS: Performed by: INTERNAL MEDICINE

## 2017-08-28 PROCEDURE — 27000221 HC OXYGEN, UP TO 24 HOURS

## 2017-08-28 PROCEDURE — 97535 SELF CARE MNGMENT TRAINING: CPT

## 2017-08-28 PROCEDURE — 94668 MNPJ CHEST WALL SBSQ: CPT

## 2017-08-28 PROCEDURE — 99232 SBSQ HOSP IP/OBS MODERATE 35: CPT | Mod: ,,, | Performed by: INTERNAL MEDICINE

## 2017-08-28 PROCEDURE — 80053 COMPREHEN METABOLIC PANEL: CPT

## 2017-08-28 PROCEDURE — 63600175 PHARM REV CODE 636 W HCPCS: Performed by: NURSE PRACTITIONER

## 2017-08-28 PROCEDURE — 85025 COMPLETE CBC W/AUTO DIFF WBC: CPT

## 2017-08-28 PROCEDURE — 25000242 PHARM REV CODE 250 ALT 637 W/ HCPCS: Performed by: NURSE PRACTITIONER

## 2017-08-28 PROCEDURE — 83735 ASSAY OF MAGNESIUM: CPT

## 2017-08-28 RX ORDER — FUROSEMIDE 10 MG/ML
60 INJECTION INTRAMUSCULAR; INTRAVENOUS ONCE
Status: COMPLETED | OUTPATIENT
Start: 2017-08-28 | End: 2017-08-28

## 2017-08-28 RX ADMIN — FAMOTIDINE 20 MG: 20 TABLET, FILM COATED ORAL at 08:08

## 2017-08-28 RX ADMIN — CIPROFLOXACIN 400 MG: 2 INJECTION, SOLUTION INTRAVENOUS at 02:08

## 2017-08-28 RX ADMIN — TACROLIMUS 1 MG: 1 CAPSULE ORAL at 08:08

## 2017-08-28 RX ADMIN — GUAIFENESIN 600 MG: 600 TABLET, EXTENDED RELEASE ORAL at 08:08

## 2017-08-28 RX ADMIN — CHLORHEXIDINE GLUCONATE 15 ML: 1.2 RINSE ORAL at 08:08

## 2017-08-28 RX ADMIN — LEVALBUTEROL 1.25 MG: 1.25 SOLUTION, CONCENTRATE RESPIRATORY (INHALATION) at 04:08

## 2017-08-28 RX ADMIN — VANCOMYCIN HYDROCHLORIDE 1250 MG: 5 INJECTION, POWDER, LYOPHILIZED, FOR SOLUTION INTRAVENOUS at 11:08

## 2017-08-28 RX ADMIN — VALGANCICLOVIR 450 MG: 450 TABLET, FILM COATED ORAL at 08:08

## 2017-08-28 RX ADMIN — CEFAZOLIN SODIUM 1 G: 1 SOLUTION INTRAVENOUS at 12:08

## 2017-08-28 RX ADMIN — SULFAMETHOXAZOLE AND TRIMETHOPRIM 1 TABLET: 800; 160 TABLET ORAL at 08:08

## 2017-08-28 RX ADMIN — DORNASE ALFA 2.5 MG: 1 SOLUTION RESPIRATORY (INHALATION) at 09:08

## 2017-08-28 RX ADMIN — MYCOPHENOLATE MOFETIL 500 MG: 250 CAPSULE ORAL at 08:08

## 2017-08-28 RX ADMIN — PREDNISONE 50 MG: 20 TABLET ORAL at 10:08

## 2017-08-28 RX ADMIN — ASPIRIN 81 MG CHEWABLE TABLET 81 MG: 81 TABLET CHEWABLE at 08:08

## 2017-08-28 RX ADMIN — INSULIN ASPART 2 UNITS: 100 INJECTION, SOLUTION INTRAVENOUS; SUBCUTANEOUS at 08:08

## 2017-08-28 RX ADMIN — OXYCODONE HYDROCHLORIDE AND ACETAMINOPHEN 1 TABLET: 7.5; 325 TABLET ORAL at 01:08

## 2017-08-28 RX ADMIN — TACROLIMUS 1 MG: 1 CAPSULE ORAL at 06:08

## 2017-08-28 RX ADMIN — FLUCONAZOLE 400 MG: 200 TABLET ORAL at 08:08

## 2017-08-28 RX ADMIN — VANCOMYCIN HYDROCHLORIDE 1500 MG: 100 INJECTION, POWDER, LYOPHILIZED, FOR SOLUTION INTRAVENOUS at 11:08

## 2017-08-28 RX ADMIN — ENOXAPARIN SODIUM 40 MG: 100 INJECTION SUBCUTANEOUS at 04:08

## 2017-08-28 RX ADMIN — CEFAZOLIN SODIUM 1 G: 1 SOLUTION INTRAVENOUS at 10:08

## 2017-08-28 RX ADMIN — LEVALBUTEROL 1.25 MG: 1.25 SOLUTION, CONCENTRATE RESPIRATORY (INHALATION) at 01:08

## 2017-08-28 RX ADMIN — LEVALBUTEROL 1.25 MG: 1.25 SOLUTION, CONCENTRATE RESPIRATORY (INHALATION) at 09:08

## 2017-08-28 RX ADMIN — FUROSEMIDE 60 MG: 10 INJECTION, SOLUTION INTRAVENOUS at 01:08

## 2017-08-28 NOTE — PT/OT/SLP PROGRESS
"Speech Language Pathology  Treatment    Martin Hendrix Jr.   MRN: [id number]   Admitting Diagnosis: Lung transplanted    Recommendations  · Regular diet  · Thin liquids  · No straws  · Double swallows between bites  · Pace bites/sips,  Take break when feeling short of breath  · Only eat when awake/alert  · All PO intake at 90 degree angle  · Continue to monitor for signs and symptoms of aspiration and discontinue oral feeding should you notice any of the following: watery eyes, reddened facial area, wet vocal quality, increased work of breathing, change in respiratory status, increased congestion, coughing, fever, etc.    SLP Treatment Date: 08/28/17  Speech Start Time: 1620     Speech Stop Time: 1630     Speech Total (min): 10 min       TREATMENT BILLABLE MINUTES:  Treatment Swallowing Dysfunction 10    Has the patient been evaluated by SLP for swallowing? : Yes  Keep patient NPO?: No   General Precautions: Standard, aspiration, fall, sternal  Current Respiratory Status: nasal cannula       Subjective:  SLP reviewed Patient with nurse prior to session, nurse reports no difficulty with meals or medications, patient with good appetite  Patient presents calm and cooperative  He explains, "It is going fine, I don't use straws"  He denies pain    Pain/Comfort  Pain Rating 1: 0/10  Pain Rating Post-Intervention 1: 0/10    Objective:   Patient found with: chest tube, telemetry, oxygen. Pt presents alert and awake in chair next to bed. He recalls 2 safe swallow strategies I'ly and then requires minimal verbal cueing to recall up to 2 additional safe swallow strategies.  He denies difficulty with meals or medications. He reports good appetite. He expresses frustration with persistent hoarseness/Aphonia. He tolerates cup sips thin liquids, self fed, x3 w/o difficulty noted.  SLP reviewed S/S aspiration, ongoing aspiration precautions and recommendations to initiate speech therapy to improve vocal quality. Patient v/u and is " in agreement with SLP POC. No further questions noted. Whiteboard current. Patient sitting in chair in room with call light in reach at end of session.     Assessment:  Martin Hendrix Jr. is a 56 y.o. male with a medical diagnosis of Lung transplanted and presents with functional oral and pharyngeal stages of swallow. Patient is tolerating thin liquids w/o overt S/S aspiration. Patient would benefit from SLP voice evaluation. Please order if in agreement.     Discharge recommendations: Discharge Facility/Level Of Care Needs:  (TBD pending progress)     Goals:    SLP Goals        Problem: SLP Goal    Goal Priority Disciplines Outcome   SLP Goal     SLP Ongoing (interventions implemented as appropriate)   Description:  Speech Language Pathology Goals  Goals expected to be met by 9/1:  1. Pt will tolerate regular consistencies and thin liquids without overt s/s of aspiration, MOD I  2. Pt will participate in Modified Barium Swallow Study to r/o aspiration and determine safest PO diet. MET 8/25  3. Educate Patient on aspiration precautions and S/S aspiration                             Plan:   Patient to be seen Therapy Frequency: 5 x/week   Plan of Care expires: 09/23/17  Plan of Care reviewed with: patient, daughter, mother  SLP Follow-up?: Yes         SILVIANO Garcia, [facility]-SLP  Speech-Language Pathology  Pager: [phone]  8/28/2017

## 2017-08-28 NOTE — PROGRESS NOTES
[facility]  Lung Transplant  Progress Note - Floor    Patient Name: Martin Hendrix Jr.  MRN: [id number]  Admission Date: 8/21/2017  Hospital Length of Stay: 7 days  Post-Operative Day: 6  Attending Physician: Darrick Wolfe MD  Primary Care Provider: Sukhi Dunham Jr, MD     Subjective:     Interval History: No acute events overnight.      Continuous Infusions:   Scheduled Meds:   aspirin  81 mg Oral Daily    chlorhexidine  15 mL Mouth/Throat BID    ciprofloxacin  400 mg Intravenous Q12H    dornase alpha  2.5 mg Inhalation BID    enoxaparin  40 mg Subcutaneous Daily    famotidine  20 mg Oral BID    fluconazole  400 mg Oral Daily    guaifenesin  600 mg Oral BID    levalbuterol  1.25 mg Nebulization Q8H    mycophenolate  500 mg Oral BID    [START ON 8/29/2017] predniSONE  40 mg Oral Daily    sulfamethoxazole-trimethoprim 800-160mg  1 tablet Oral Every Mon, Wed, Fri    tacrolimus  1 mg Oral BID    valganciclovir  450 mg Oral BID    vancomycin (VANCOCIN) IVPB  1,250 mg Intravenous Q12H     PRN Meds:acetaminophen, dextrose 50%, dextrose 50%, glucagon (human recombinant), glucose, glucose, HYDROmorphone, insulin aspart, magnesium sulfate IVPB **AND** magnesium sulfate IVPB, ondansetron, oxycodone-acetaminophen, potassium chloride 10% **AND** potassium chloride 10% **AND** potassium chloride 10%, promethazine (PHENERGAN) IVPB, tramadol    Review of patient's allergies indicates:  No Known Allergies    Review of Systems   Constitutional: Negative for appetite change, chills, fatigue, fever and unexpected weight change.   HENT: Positive for voice change. Negative for congestion, ear pain, hearing loss, mouth sores and postnasal drip.    Eyes: Negative for photophobia, pain, discharge, redness, itching and visual disturbance.   Respiratory: Negative for cough, chest tightness and shortness of breath.    Cardiovascular: Positive for chest pain (at incision sites) and leg swelling. Negative  for palpitations.   Gastrointestinal: Negative for abdominal distention, abdominal pain, blood in stool, constipation and diarrhea.   Endocrine: Negative for cold intolerance, heat intolerance, polydipsia, polyphagia and polyuria.   Genitourinary: Negative for decreased urine volume, difficulty urinating, dysuria, frequency, hematuria and urgency.   Musculoskeletal: Negative for arthralgias and joint swelling.   Allergic/Immunologic: Positive for immunocompromised state. Negative for environmental allergies and food allergies.   Neurological: Positive for weakness (due to debility). Negative for dizziness, tremors, syncope, speech difficulty and numbness.   Hematological: Negative for adenopathy. Does not bruise/bleed easily.   Psychiatric/Behavioral: Negative for agitation, confusion and hallucinations.     Objective:   Physical Exam   Constitutional: He is oriented to person, place, and time. He appears well-developed and well-nourished. No distress.   HENT:   Head: Normocephalic and atraumatic.   Nose: Nose normal.   Mouth/Throat: Oropharynx is clear and moist. No oropharyngeal exudate.   Eyes: Conjunctivae and EOM are normal. Pupils are equal, round, and reactive to light. No scleral icterus.   Neck: Normal range of motion. Neck supple. No JVD present. No tracheal deviation present. No thyromegaly present.   Cardiovascular: Normal rate, regular rhythm and normal heart sounds.    Pulmonary/Chest: Effort normal and breath sounds normal. No respiratory distress. He has no wheezes. He has no rales. He exhibits no tenderness.   Chest tubes in place with serosanguinous output and no air leaks.   Abdominal: Soft. Bowel sounds are normal. He exhibits no distension. There is no tenderness.   Musculoskeletal: Normal range of motion. He exhibits edema (trace BLE). He exhibits no tenderness.   Neurological: He is alert and oriented to person, place, and time. No cranial nerve deficit.   Skin: Skin is warm and dry. He is not  diaphoretic.   Psychiatric: He has a normal mood and affect. Thought content normal.         Vital Signs (Most Recent):  Temp: 97.7 °F (36.5 °C) (08/28/17 1242)  Pulse: 74 (08/28/17 1242)  Resp: 18 (08/28/17 1242)  BP: 128/75 (08/28/17 1242)  SpO2: 95 % (08/28/17 1242) Vital Signs (24h Range):  Temp:  [97.5 °F (36.4 °C)-98.3 °F (36.8 °C)] 97.7 °F (36.5 °C)  Pulse:  [59-78] 74  Resp:  [18-20] 18  SpO2:  [94 %-99 %] 95 %  BP: (115-142)/(60-75) 128/75     Weight: 107 kg (235 lb 14.3 oz)  Body mass index is 33.85 kg/m².      Intake/Output Summary (Last 24 hours) at 08/28/17 1422  Last data filed at 08/28/17 1300   Gross per 24 hour   Intake             2190 ml   Output             2635 ml   Net             -445 ml       Significant Labs:  CBC:    Recent Labs  Lab 08/28/17  0358   WBC 11.65   RBC 3.13*   HGB 9.5*   HCT 27.8*      MCV 89   MCH 30.4   MCHC 34.2     BMP:    Recent Labs  Lab 08/28/17  0358   *   K 5.7*   CL 98   CO2 29   BUN 64*   CREATININE 1.5*   CALCIUM 7.6*      Tacrolimus Levels:    Recent Labs  Lab 08/28/17  0358   TACROLIMUS 5.2     Microbiology:  Microbiology Results (last 7 days)     Procedure Component Value Units Date/Time    Culture, Anaerobe [[id number]] Collected:  08/22/17 0814    Order Status:  Completed Specimen:  Tissue from Lung, Lingula Updated:  08/28/17 0922     Anaerobic Culture No anaerobes isolated    Narrative:       Bronchus of donor lung    Culture, Respiratory [[id number]] Collected:  08/23/17 0957    Order Status:  Completed Specimen:  Respiratory from Bronchial Wash Updated:  08/25/17 1103     Respiratory Culture --     STAPHYLOCOCCUS AUREUS  Moderate  Normal respiratory mary also present       Gram Stain (Respiratory) Few WBC's     Gram Stain (Respiratory) No organisms seen    Narrative:       Bronchial Wash    AFB Culture & Smear [[id number]] Collected:  08/23/17 0957    Order Status:  Completed Specimen:  Respiratory from Bronchial Wash Updated:  08/24/17 2123      AFB Culture & Smear Culture in progress     AFB CULTURE STAIN No acid fast bacilli seen.    Narrative:       Bronchial Wash    Aerobic culture [[id number]]  (Susceptibility) Collected:  08/22/17 0816    Order Status:  Completed Specimen:  Tissue from Lung, Lingula Updated:  08/24/17 1304     Aerobic Bacterial Culture --     STAPHYLOCOCCUS AUREUS  Few      Narrative:       Bronchus of donor lung    Fungus culture [[id number]] Collected:  08/22/17 0815    Order Status:  Completed Specimen:  Tissue from Lung, Lingula Updated:  08/23/17 1358     Fungus (Mycology) Culture Culture in progress    Narrative:       Bronchus of donor lung    AFB Culture & Smear [[id number]] Collected:  08/22/17 0814    Order Status:  Completed Specimen:  Tissue from Lung, Lingula Updated:  08/23/17 1354     AFB Culture & Smear Culture in progress     AFB CULTURE STAIN No acid fast bacilli seen.    Narrative:       Bronchus of donor lung    Fungus culture [[id number]] Collected:  08/23/17 0957    Order Status:  Sent Specimen:  Respiratory from Bronchial Wash Updated:  08/23/17 1241    Urine culture [[id number]] Collected:  08/21/17 2027    Order Status:  Completed Specimen:  Urine from Urine, Clean Catch Updated:  08/22/17 2333     Urine Culture, Routine No growth    Gram stain [[id number]] Collected:  08/22/17 0814    Order Status:  Completed Specimen:  Tissue from Lung, Lingula Updated:  08/22/17 0948     Gram Stain Result No WBC's      No organisms seen    Narrative:       Bronchus of donor lung          I have reviewed all pertinent labs within the past 24 hours.    Diagnostic Results:        Assessment/Plan:     * Lung transplanted    There is no evidence of graft dysfunction. Currently on 2L NC.  Will wean oxygen as tolerated.  Continue CPT, incentive spirometer, and bronchodilators. Continue to monitor chest tube output which is adequate. Mediastinal tubes removed today.        Immunosuppression    Solu-Medrol administered in the OR.  Basiliximab in the ICU. Continue tacrolimus, mycophenolate, and corticoteroid taper         Prophylactic antibiotic    Bactrim, valganciclovir, and diflucan for opportunistic infection prophylaxis. Continue ciprofloxacin; and Ancef for MSSA in donor.          Leukocytosis    Expected after transplant. Will continue to monitor.         Hyperglycemia    Appreciate input from endocrinology        Atrial fibrillation    Currently in NSR.          Dysphonia    Secondary to left vocal cord dysfunction. SLP cleared him for a regular diet. ENT following        AVILA (acute kidney injury)    Likely form ATN after surgery. His creatinine is improving and he is making good urine.        Hyponatremia    I suspect from AVILA, will continue to monitor.        Hyperkalemia    I suspect from AVILA, no interventions unless >6            Gayle EDUARDO Celestin NP  Lung Transplant  [facility]

## 2017-08-28 NOTE — PLAN OF CARE
Problem: Patient Care Overview  Goal: Plan of Care Review  Outcome: Ongoing (interventions implemented as appropriate)  -AAOx4  -right forearm 22g infusing Cipro, dressing CDI  -self meds were by daughter 100%. Will continue to monitor.  -2L NC O2 sat %  -Aspiration precautions in place. HOB elevated @ 45 degrees. No straws. Will continue to monitor.  -ENT consulted for vocal cords.  -Telemetry in place, NSR  -Blood glucose checks ACHS. Last , no SSI needed    Problem: Fall Risk (Adult)  Goal: Identify Related Risk Factors and Signs and Symptoms  Related risk factors and signs and symptoms are identified upon initiation of Human Response Clinical Practice Guideline (CPG)   Outcome: Ongoing (interventions implemented as appropriate)  -Pt has generalized weakness and is a 1 person assist.   -Pt educated about fall risk and verbalizes understanding.  -Pt wears non-slip socks when out of bed  -Bed in low locked position, call light within reach, bed rails up x2  -Pt is free from falls/injursing so far during shift. Will continue to monitor.    Problem: Infection, Risk/Actual (Adult)  Goal: Identify Related Risk Factors and Signs and Symptoms  Related risk factors and signs and symptoms are identified upon initiation of Human Response Clinical Practice Guideline (CPG)   Outcome: Ongoing (interventions implemented as appropriate)  -Pt afebrile with Tmax of 98.3  -Pt receiving prophylactic abx.    Problem: Lung Surgery (via Thoracotomy) (Adult)  Goal: Signs and Symptoms of Listed Potential Problems Will be Absent, Minimized or Managed (Lung Surgery)  Signs and symptoms of listed potential problems will be absent, minimized or managed by discharge/transition of care (reference Lung Surgery (via Thoracotomy) (Adult) CPG).   Outcome: Ongoing (interventions implemented as appropriate)  -Midsternal incision with steri-strips. CDI. Edges approximated. No drainage.    -Mediastinal chest tube to water seal draining  sanguineous fluid. No signs of subq emphysema. Occlusive gauze dressing in place CDI. Will continue to monitor.  -Left pleural chest tube to water seal draining sanguineous fluid. No signs of subq emphysema. Occlusive gauze dressing in place CDI. Will continue to monitor.    Problem: Pressure Ulcer Risk (Santhosh Scale) (Adult,Obstetrics,Pediatric)  Goal: Identify Related Risk Factors and Signs and Symptoms  Related risk factors and signs and symptoms are identified upon initiation of Human Response Clinical Practice Guideline (CPG)   Outcome: Ongoing (interventions implemented as appropriate)  -Pt positions himself independently  -Ambulates with assistance from bed to chair.

## 2017-08-28 NOTE — ASSESSMENT & PLAN NOTE
There is no evidence of graft dysfunction. Currently on 2L NC.  Will wean oxygen as tolerated.  Continue CPT, incentive spirometer, and bronchodilators. Continue to monitor chest tube output which is adequate. Mediastinal tubes removed today.

## 2017-08-28 NOTE — PT/OT/SLP PROGRESS
Occupational Therapy  Treatment    Martin Hendrix Jr.   MRN: [id number]   Admitting Diagnosis: Lung transplanted    OT Date of Treatment: 08/28/17   OT Start Time: 1042  OT Stop Time: 1114  OT Total Time (min): 32 min    Billable Minutes:  Self Care/Home Management 15 and Therapeutic Activity 15    General Precautions: Standard, aspiration, nectar thick, fall, sternal (cardiac)  Orthopedic Precautions: N/A  Braces: N/A         Subjective:  Communicated with RN prior to session.  Pt agreed to participate in therapy session.   Pain/Comfort  Pain Rating 1: 0/10  Pain Addressed 1: Pre-medicate for activity  Pain Rating Post-Intervention 1: 0/10    Objective:  Patient found with: chest tube, telemetry, oxygen     Functional Mobility:  Bed Mobility:  Supine to Sit: Other (see comments) (Pt found sitting UIC )  Sit to Supine: Other (see comments) (Pt left UIC )    Transfers:   Sit <> Stand Assistance: Stand By Assistance ( from bedside chair x 2 trials )  Sit <> Stand Assistive Device: No Assistive Device  Toilet Transfer Technique: Stand Pivot  Toilet Transfer Assistance: Stand By Assistance (v/c's for safety )  Toilet Transfer Assistive Device: No Assistive Device    Functional Ambulation: Pt performed functional ambulation within room with no AD and SBA for safety. Pt experienced x 1 LOB but was able to regain balance (I) with no intervention from therapist. Pt took x 3 side steps R<>L with no LOB, no AD, and SBA.     Activities of Daily Living:     UE Dressing Level of Assistance: Stand by assistance (doff gown sitting in bedside chair )    LE Dressing Level of Assistance: Stand by assistance (don B  socks sitting in bedside chair. Pt required increased time due to SOB and increased edema of B LEs'. Pt educated on performing LB dressing in figure 4 technique. )    Grooming Position: Standing at sink  Grooming Level of Assistance:  (Pt completed hand washing and face washing when standing at sink. Pt required  standing rest break due to fatigue and SOB. Pt educated on breathing techniques to promote improved lung capacity and decrease SOB. )     Toileting Where Assessed: Toilet  Toileting Level of Assistance: Stand by assistance (Pt urinated sitting on standard commode. Pt able to complete clothing management and hygiene. )        Balance:   Static Sit: GOOD: Takes MODERATE challenges from all directions  Dynamic Sit: GOOD: Maintains balance through MODERATE excursions of active trunk movement  Static Stand: GOOD: Takes MODERATE challenges from all directions  Dynamic stand: GOOD: Needs SUPERVISION only during gait and able to self right with moderate     Therapeutic Activities and Exercises:  Pt educated by therapist on:   - Pt educated on role of OT, POC, and goals for therapy.     - Importance of OOB ax's with staff member assistance   - Pt instructed to verbally state all sternal precautions to ensure carry over and maintain sternal precautions prior to performing OOB mobility   - Pt completed ADLs and functional mobility for treatment session as noted above   - Pt educated on safety throughout functional transfer training and hand placement when completing functional transfers.   -Pt verbalized understanding. Pt and family expressed no further concerns/questions.    Pt completed 1 sets of 15 reps of B LE AROM exercise program sitting supported in bedside chair in order to work towards increasing LB strength/endurance, decrease B LE edema, and to maximize safety and (I) with mobility and self-care skills in standing.  Pt completed the following exercises with initial demonstration from therapist: hip flexion, ankle pumps, and LAQ . Pt required rest breaks in between sets. Pt instructed to complete B UE HEP at least 2-3 times daily.    Pt completed 1 sets of 12 reps of B UE AROM exercise program sitting supported in bedside chair in order to work towards increasing UB strength/endurance and to maximize safety and (I)  "with mobility and self-care skills. All exercises performed within pt's sternal precaution parameters. Pt completed the following exercises with initial demonstration from therapist: shld flexion/extension to 90*, elbow flexion/extension, chest press, and hand pumps. Pt required rest breaks in between sets due to fatigue and SOB.      AM-PAC 6 CLICK ADL   How much help from another person does this patient currently need?   1 = Unable, Total/Dependent Assistance  2 = A lot, Maximum/Moderate Assistance  3 = A little, Minimum/Contact Guard/Supervision  4 = None, Modified [place]/Independent    Putting on and taking off regular lower body clothing? : 3  Bathing (including washing, rinsing, drying)?: 3  Toileting, which includes using toilet, bedpan, or urinal? : 3  Putting on and taking off regular upper body clothing?: 3  Taking care of personal grooming such as brushing teeth?: 3  Eating meals?: 3  Total Score: 18     AM-PAC Raw Score CMS "G-Code Modifier Level of Impairment Assistance   6 % Total / Unable   7 - 8 CM 80 - 100% Maximal Assist   9-13 CL 60 - 80% Moderate Assist   14 - 19 CK 40 - 60% Moderate Assist   20 - 22 CJ 20 - 40% Minimal Assist   23 CI 1-20% SBA / CGA   24 CH 0% Independent/ Mod I       Patient left up in chair with all lines intact, call button in reach, RN notified and family present    ASSESSMENT:  Martin Hendrix Jr. is a 56 y.o. male with a medical diagnosis of Lung transplanted. Pt tolerated session well and is motivated to participate in OT sessions. Pt will continue to benefit from skilled OT in order to further maximize pt's safety and (I) with functional mobility and ADLs.     Rehab identified problem list/impairments: Rehab identified problem list/impairments: weakness, impaired endurance, impaired self care skills, impaired functional mobilty, impaired balance, decreased upper extremity function, decreased lower extremity function, impaired cardiopulmonary response to " activity    Rehab potential is good.    Activity tolerance: Fair    Discharge recommendations: Discharge Facility/Level Of Care Needs: home health OT     Barriers to discharge: Barriers to Discharge: None    Equipment recommendations: none     GOALS:    Occupational Therapy Goals        Problem: Occupational Therapy Goal    Goal Priority Disciplines Outcome Interventions   Occupational Therapy Goal     OT, PT/OT Ongoing (interventions implemented as appropriate)    Description:  Goals to be met by: 9/1/2017     Patient will increase functional independence with ADLs by performing:    UE Dressing with Set-up Assistance.  LE Dressing with Stand by Assistance.  Grooming while standing with Stand by Assistance. - MET 8/28  Toileting from toilet with Minimal Assistance for hygiene and clothing management. - MET 8/28   Toilet transfer to toilet with Supervision.  Supine to sit with Stand by assistance.                        Plan:  Patient to be seen 5 x/week to address the above listed problems via self-care/home management, therapeutic activities, therapeutic exercises, neuromuscular re-education  Plan of Care expires: 09/25/17  Plan of Care reviewed with: patient, daughter, mother         Jacquelyn Chaudhary, OT  08/28/2017

## 2017-08-28 NOTE — PLAN OF CARE
Problem: SLP Goal  Goal: SLP Goal  Speech Language Pathology Goals  Goals expected to be met by 9/1:  1. Pt will tolerate regular consistencies and thin liquids without overt s/s of aspiration, MOD I  2. Pt will participate in Modified Barium Swallow Study to r/o aspiration and determine safest PO diet. MET 8/25  3. Educate Patient on aspiration precautions and S/S aspiration           Outcome: Ongoing (interventions implemented as appropriate)     ST to continue to monitor tolerance of regular diet and use of aspiration precautions x1. Patient with persistent hoarseness 2/2 L TVC hypo-mobility  .  Patient would benefit from SLP voice evaluation. Please order if in agreement.  Thank you.    SILVIO Garcia., [facility]-SLP  Speech-Language Pathology  Pager: [phone]  8/28/2017

## 2017-08-28 NOTE — SUBJECTIVE & OBJECTIVE
Subjective:     Interval History: No acute events overnight.      Continuous Infusions:   Scheduled Meds:   aspirin  81 mg Oral Daily    chlorhexidine  15 mL Mouth/Throat BID    ciprofloxacin  400 mg Intravenous Q12H    dornase alpha  2.5 mg Inhalation BID    enoxaparin  40 mg Subcutaneous Daily    famotidine  20 mg Oral BID    fluconazole  400 mg Oral Daily    guaifenesin  600 mg Oral BID    levalbuterol  1.25 mg Nebulization Q8H    mycophenolate  500 mg Oral BID    [START ON 8/29/2017] predniSONE  40 mg Oral Daily    sulfamethoxazole-trimethoprim 800-160mg  1 tablet Oral Every Mon, Wed, Fri    tacrolimus  1 mg Oral BID    valganciclovir  450 mg Oral BID    vancomycin (VANCOCIN) IVPB  1,250 mg Intravenous Q12H     PRN Meds:acetaminophen, dextrose 50%, dextrose 50%, glucagon (human recombinant), glucose, glucose, HYDROmorphone, insulin aspart, magnesium sulfate IVPB **AND** magnesium sulfate IVPB, ondansetron, oxycodone-acetaminophen, potassium chloride 10% **AND** potassium chloride 10% **AND** potassium chloride 10%, promethazine (PHENERGAN) IVPB, tramadol    Review of patient's allergies indicates:  No Known Allergies    Review of Systems   Constitutional: Negative for appetite change, chills, fatigue, fever and unexpected weight change.   HENT: Positive for voice change. Negative for congestion, ear pain, hearing loss, mouth sores and postnasal drip.    Eyes: Negative for photophobia, pain, discharge, redness, itching and visual disturbance.   Respiratory: Negative for cough, chest tightness and shortness of breath.    Cardiovascular: Positive for chest pain (at incision sites) and leg swelling. Negative for palpitations.   Gastrointestinal: Negative for abdominal distention, abdominal pain, blood in stool, constipation and diarrhea.   Endocrine: Negative for cold intolerance, heat intolerance, polydipsia, polyphagia and polyuria.   Genitourinary: Negative for decreased urine volume, difficulty  urinating, dysuria, frequency, hematuria and urgency.   Musculoskeletal: Negative for arthralgias and joint swelling.   Allergic/Immunologic: Positive for immunocompromised state. Negative for environmental allergies and food allergies.   Neurological: Positive for weakness (due to debility). Negative for dizziness, tremors, syncope, speech difficulty and numbness.   Hematological: Negative for adenopathy. Does not bruise/bleed easily.   Psychiatric/Behavioral: Negative for agitation, confusion and hallucinations.     Objective:   Physical Exam   Constitutional: He is oriented to person, place, and time. He appears well-developed and well-nourished. No distress.   HENT:   Head: Normocephalic and atraumatic.   Nose: Nose normal.   Mouth/Throat: Oropharynx is clear and moist. No oropharyngeal exudate.   Eyes: Conjunctivae and EOM are normal. Pupils are equal, round, and reactive to light. No scleral icterus.   Neck: Normal range of motion. Neck supple. No JVD present. No tracheal deviation present. No thyromegaly present.   Cardiovascular: Normal rate, regular rhythm and normal heart sounds.    Pulmonary/Chest: Effort normal and breath sounds normal. No respiratory distress. He has no wheezes. He has no rales. He exhibits no tenderness.   Chest tubes in place with serosanguinous output and no air leaks.   Abdominal: Soft. Bowel sounds are normal. He exhibits no distension. There is no tenderness.   Musculoskeletal: Normal range of motion. He exhibits edema (trace BLE). He exhibits no tenderness.   Neurological: He is alert and oriented to person, place, and time. No cranial nerve deficit.   Skin: Skin is warm and dry. He is not diaphoretic.   Psychiatric: He has a normal mood and affect. Thought content normal.         Vital Signs (Most Recent):  Temp: 97.7 °F (36.5 °C) (08/28/17 1242)  Pulse: 74 (08/28/17 1242)  Resp: 18 (08/28/17 1242)  BP: 128/75 (08/28/17 1242)  SpO2: 95 % (08/28/17 1242) Vital Signs (24h  Range):  Temp:  [97.5 °F (36.4 °C)-98.3 °F (36.8 °C)] 97.7 °F (36.5 °C)  Pulse:  [59-78] 74  Resp:  [18-20] 18  SpO2:  [94 %-99 %] 95 %  BP: (115-142)/(60-75) 128/75     Weight: 107 kg (235 lb 14.3 oz)  Body mass index is 33.85 kg/m².      Intake/Output Summary (Last 24 hours) at 08/28/17 1422  Last data filed at 08/28/17 1300   Gross per 24 hour   Intake             2190 ml   Output             2635 ml   Net             -445 ml       Significant Labs:  CBC:    Recent Labs  Lab 08/28/17 0358   WBC 11.65   RBC 3.13*   HGB 9.5*   HCT 27.8*      MCV 89   MCH 30.4   MCHC 34.2     BMP:    Recent Labs  Lab 08/28/17 0358   *   K 5.7*   CL 98   CO2 29   BUN 64*   CREATININE 1.5*   CALCIUM 7.6*      Tacrolimus Levels:    Recent Labs  Lab 08/28/17 0358   TACROLIMUS 5.2     Microbiology:  Microbiology Results (last 7 days)     Procedure Component Value Units Date/Time    Culture, Anaerobe [[id number]] Collected:  08/22/17 0814    Order Status:  Completed Specimen:  Tissue from Lung, Lingula Updated:  08/28/17 0922     Anaerobic Culture No anaerobes isolated    Narrative:       Bronchus of donor lung    Culture, Respiratory [[id number]] Collected:  08/23/17 0957    Order Status:  Completed Specimen:  Respiratory from Bronchial Wash Updated:  08/25/17 1103     Respiratory Culture --     STAPHYLOCOCCUS AUREUS  Moderate  Normal respiratory mary also present       Gram Stain (Respiratory) Few WBC's     Gram Stain (Respiratory) No organisms seen    Narrative:       Bronchial Wash    AFB Culture & Smear [[id number]] Collected:  08/23/17 0957    Order Status:  Completed Specimen:  Respiratory from Bronchial Wash Updated:  08/24/17 2127     AFB Culture & Smear Culture in progress     AFB CULTURE STAIN No acid fast bacilli seen.    Narrative:       Bronchial Wash    Aerobic culture [[id number]]  (Susceptibility) Collected:  08/22/17 0816    Order Status:  Completed Specimen:  Tissue from Lung, Lingula Updated:  08/24/17  1304     Aerobic Bacterial Culture --     STAPHYLOCOCCUS AUREUS  Few      Narrative:       Bronchus of donor lung    Fungus culture [[id number]] Collected:  08/22/17 0815    Order Status:  Completed Specimen:  Tissue from Lung, Lingula Updated:  08/23/17 1358     Fungus (Mycology) Culture Culture in progress    Narrative:       Bronchus of donor lung    AFB Culture & Smear [[id number]] Collected:  08/22/17 0814    Order Status:  Completed Specimen:  Tissue from Lung, Lingula Updated:  08/23/17 1354     AFB Culture & Smear Culture in progress     AFB CULTURE STAIN No acid fast bacilli seen.    Narrative:       Bronchus of donor lung    Fungus culture [[id number]] Collected:  08/23/17 0957    Order Status:  Sent Specimen:  Respiratory from Bronchial Wash Updated:  08/23/17 1241    Urine culture [[id number]] Collected:  08/21/17 2027    Order Status:  Completed Specimen:  Urine from Urine, Clean Catch Updated:  08/22/17 2333     Urine Culture, Routine No growth    Gram stain [[id number]] Collected:  08/22/17 0814    Order Status:  Completed Specimen:  Tissue from Lung, Lingula Updated:  08/22/17 0948     Gram Stain Result No WBC's      No organisms seen    Narrative:       Bronchus of donor lung          I have reviewed all pertinent labs within the past 24 hours.    Diagnostic Results:

## 2017-08-28 NOTE — PLAN OF CARE
Problem: Patient Care Overview  Goal: Plan of Care Review  Outcome: Ongoing (interventions implemented as appropriate)  Pt aao x 4. Patient currently in bedside chair with call light in reach. Free from falls/injury this shift and wearing non-skid footwear when ambulating around room. Encouraged to call for assistance when needed, pt has verbalized understanding. Daughter pulling self-meds 100%, med box/blue card updated. Pt has received IV ABX (vanc, cipro, and ancef- ancef now d/c). Percocet PO given for c/o pain, mediastinal chest tube pulled today. Patient using IS frequently. On 2LNC, weaning as tolerated. Patient has received IV lasix 60 mg- see flowsheets for output. Patient worked with OT and speech therapy today. BG within normal limits, eating 100% of meals.    Will continue to monitor, assess, and adjust care as needed.

## 2017-08-28 NOTE — PROGRESS NOTES
"[facility]  Endocrinology  Progress Note    Admit Date: 8/21/2017     Reason for Consult: Management of  Hyperglycemia     Surgical Procedure and Date:  8/22/17 s/p bilateral lung transplant       HPI:  Mr. 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 He is now s/p bilateral lung transplant with steroid induced hyperglycemia. Endocrine consulted for bg management.       Interval HPI:    tolerating diet, decreasing insulin needs, no hypoglycemia     /60 (BP Location: Right arm, Patient Position: Sitting)   Pulse 78   Temp 97.8 °F (36.6 °C) (Oral)   Resp 18   Ht 5' 10" (1.778 m)   Wt 107 kg (235 lb 14.3 oz)   SpO2 (!) 94%   BMI 33.85 kg/m²       Labs Reviewed and Include      Recent Labs  Lab 08/28/17  0358   *   CALCIUM 7.6*   ALBUMIN 2.4*   PROT 4.9*   *   K 5.7*   CO2 29   CL 98   BUN 64*   CREATININE 1.5*   ALKPHOS 45*   ALT 18   AST 17   BILITOT 0.5     Lab Results   Component Value Date    WBC 11.65 08/28/2017    HGB 9.5 (L) 08/28/2017    HCT 27.8 (L) 08/28/2017    MCV 89 08/28/2017     08/28/2017     No results for input(s): TSH, FREET4 in the last 168 hours.  Lab Results   Component Value Date    HGBA1C 6.7 (H) 04/24/2017       Nutritional status:   Body mass index is 33.85 kg/m².  Lab Results   Component Value Date    ALBUMIN 2.4 (L) 08/28/2017    ALBUMIN 2.6 (L) 08/27/2017    ALBUMIN 2.7 (L) 08/26/2017     No results found for: PREALBUMIN    Estimated Creatinine Clearance: 69 mL/min (based on Cr of 1.5).    Accu-Checks  Recent Labs      08/26/17   0039  08/26/17   0801  08/26/17   1212  08/26/17   1716  08/26/17   2308  08/27/17   0741  08/27/17   1215  08/27/17   1651  08/27/17   2124  08/28/17   0808   POCTGLUCOSE  171*  163*  153*  216*  177*  121*  176*  195*  163*  87       Current Medications and/or Treatments Impacting Glycemic Control  Immunotherapy:  Immunosuppressants         Stop Route Frequency     " mycophenolate capsule 500 mg      -- Oral 2 times daily     tacrolimus capsule 1 mg      -- Oral 2 times daily        Steroids:   Hormones     Start     Stop Route Frequency Ordered    08/29/17 0900  predniSONE tablet 40 mg      -- Oral Daily 08/23/17 0808        Pressors:    Autonomic Drugs     None        Hyperglycemia/Diabetes Medications: Antihyperglycemics     Start     Stop Route Frequency Ordered    08/26/17 0845  insulin aspart pen 2 Units      08/27 0714 SubQ with breakfast 08/26/17 0843    08/24/17 1410  insulin aspart pen 0-4 Units      -- SubQ As needed (PRN) 08/24/17 1311          ASSESSMENT and PLAN    Hyperglycemia    Bg goal 140-180. D/c scheduled Novolog   BG monitoring Ac/hs with low dose correction.       Discharge planning is ongoing.  Anticipate resolution of BG as steroids continue to taper.          Non morbid obesity due to excess calories    May increase insulin resistance   Body mass index is 33.85 kg/m².            Immunosuppression    May increase insulin resistance.         Adrenal cortical steroids causing adverse effect in therapeutic use    Increasing insulin needs          Sarcoidosis    S/p lung txp 8/22/17           * Lung transplanted    Per DEVAN Gross, GURPREET, NP  Endocrinology  [facility]

## 2017-08-28 NOTE — ASSESSMENT & PLAN NOTE
Bg goal 140-180. D/c scheduled Novolog   BG monitoring Ac/hs with low dose correction.       Discharge planning is ongoing.  Anticipate resolution of BG as steroids continue to taper.

## 2017-08-28 NOTE — SUBJECTIVE & OBJECTIVE
"Interval HPI:    tolerating diet, decreasing insulin needs, no hypoglycemia     /60 (BP Location: Right arm, Patient Position: Sitting)   Pulse 78   Temp 97.8 °F (36.6 °C) (Oral)   Resp 18   Ht 5' 10" (1.778 m)   Wt 107 kg (235 lb 14.3 oz)   SpO2 (!) 94%   BMI 33.85 kg/m²     Labs Reviewed and Include      Recent Labs  Lab 08/28/17  0358   *   CALCIUM 7.6*   ALBUMIN 2.4*   PROT 4.9*   *   K 5.7*   CO2 29   CL 98   BUN 64*   CREATININE 1.5*   ALKPHOS 45*   ALT 18   AST 17   BILITOT 0.5     Lab Results   Component Value Date    WBC 11.65 08/28/2017    HGB 9.5 (L) 08/28/2017    HCT 27.8 (L) 08/28/2017    MCV 89 08/28/2017     08/28/2017     No results for input(s): TSH, FREET4 in the last 168 hours.  Lab Results   Component Value Date    HGBA1C 6.7 (H) 04/24/2017       Nutritional status:   Body mass index is 33.85 kg/m².  Lab Results   Component Value Date    ALBUMIN 2.4 (L) 08/28/2017    ALBUMIN 2.6 (L) 08/27/2017    ALBUMIN 2.7 (L) 08/26/2017     No results found for: PREALBUMIN    Estimated Creatinine Clearance: 69 mL/min (based on Cr of 1.5).    Accu-Checks  Recent Labs      08/26/17   0039  08/26/17   0801  08/26/17   1212  08/26/17   1716  08/26/17   2308  08/27/17   0741  08/27/17   1215  08/27/17   1651  08/27/17   2124  08/28/17   0808   POCTGLUCOSE  171*  163*  153*  216*  177*  121*  176*  195*  163*  87       Current Medications and/or Treatments Impacting Glycemic Control  Immunotherapy:  Immunosuppressants         Stop Route Frequency     mycophenolate capsule 500 mg      -- Oral 2 times daily     tacrolimus capsule 1 mg      -- Oral 2 times daily        Steroids:   Hormones     Start     Stop Route Frequency Ordered    08/29/17 0900  predniSONE tablet 40 mg      -- Oral Daily 08/23/17 0808        Pressors:    Autonomic Drugs     None        Hyperglycemia/Diabetes Medications: Antihyperglycemics     Start     Stop Route Frequency Ordered    08/26/17 0857  insulin aspart " pen 2 Units      08/27 0714 SubQ with breakfast 08/26/17 0843    08/24/17 1410  insulin aspart pen 0-4 Units      -- SubQ As needed (PRN) 08/24/17 1311

## 2017-08-28 NOTE — PLAN OF CARE
Problem: Occupational Therapy Goal  Goal: Occupational Therapy Goal  Goals to be met by: 9/1/2017     Patient will increase functional independence with ADLs by performing:    UE Dressing with Set-up Assistance.  LE Dressing with Stand by Assistance.  Grooming while standing with Stand by Assistance. - MET 8/28  Toileting from toilet with Minimal Assistance for hygiene and clothing management. - MET 8/28   Toilet transfer to toilet with Supervision.  Supine to sit with Stand by assistance.      Outcome: Ongoing (interventions implemented as appropriate)  Pt is progressing well this date meeting 2 goals. All goals remain appropriate. Continue POC     Jacquelyn Chaudhary OT  8/28/2017

## 2017-08-29 LAB
ALBUMIN SERPL BCP-MCNC: 2.3 G/DL
ALP SERPL-CCNC: 51 U/L
ALT SERPL W/O P-5'-P-CCNC: 14 U/L
ANION GAP SERPL CALC-SCNC: 4 MMOL/L
AST SERPL-CCNC: 17 U/L
BASOPHILS # BLD AUTO: 0.01 K/UL
BASOPHILS NFR BLD: 0.1 %
BILIRUB SERPL-MCNC: 0.6 MG/DL
BUN SERPL-MCNC: 48 MG/DL
CALCIUM SERPL-MCNC: 7.8 MG/DL
CHLORIDE SERPL-SCNC: 95 MMOL/L
CO2 SERPL-SCNC: 33 MMOL/L
CREAT SERPL-MCNC: 1.3 MG/DL
DIFFERENTIAL METHOD: ABNORMAL
EOSINOPHIL # BLD AUTO: 0 K/UL
EOSINOPHIL NFR BLD: 0.1 %
ERYTHROCYTE [DISTWIDTH] IN BLOOD BY AUTOMATED COUNT: 13.5 %
EST. GFR  (AFRICAN AMERICAN): >60 ML/MIN/1.73 M^2
EST. GFR  (NON AFRICAN AMERICAN): >60 ML/MIN/1.73 M^2
GLUCOSE SERPL-MCNC: 131 MG/DL
HCT VFR BLD AUTO: 30.2 %
HGB BLD-MCNC: 10.5 G/DL
LYMPHOCYTES # BLD AUTO: 0.5 K/UL
LYMPHOCYTES NFR BLD: 3.8 %
MAGNESIUM SERPL-MCNC: 2.2 MG/DL
MCH RBC QN AUTO: 31.4 PG
MCHC RBC AUTO-ENTMCNC: 34.8 G/DL
MCV RBC AUTO: 90 FL
MONOCYTES # BLD AUTO: 1.4 K/UL
MONOCYTES NFR BLD: 10 %
NEUTROPHILS # BLD AUTO: 12.1 K/UL
NEUTROPHILS NFR BLD: 85.3 %
PLATELET # BLD AUTO: 292 K/UL
PMV BLD AUTO: 9 FL
POCT GLUCOSE: 132 MG/DL (ref 70–110)
POCT GLUCOSE: 251 MG/DL (ref 70–110)
POCT GLUCOSE: 69 MG/DL (ref 70–110)
POTASSIUM SERPL-SCNC: 5.5 MMOL/L
PROT SERPL-MCNC: 4.9 G/DL
RBC # BLD AUTO: 3.34 M/UL
SODIUM SERPL-SCNC: 132 MMOL/L
TACROLIMUS BLD-MCNC: 6.4 NG/ML
VANCOMYCIN TROUGH SERPL-MCNC: 12.2 UG/ML
WBC # BLD AUTO: 14.16 K/UL

## 2017-08-29 PROCEDURE — 83735 ASSAY OF MAGNESIUM: CPT

## 2017-08-29 PROCEDURE — 27000221 HC OXYGEN, UP TO 24 HOURS

## 2017-08-29 PROCEDURE — 80053 COMPREHEN METABOLIC PANEL: CPT

## 2017-08-29 PROCEDURE — 36415 COLL VENOUS BLD VENIPUNCTURE: CPT

## 2017-08-29 PROCEDURE — 99232 SBSQ HOSP IP/OBS MODERATE 35: CPT | Mod: ,,, | Performed by: INTERNAL MEDICINE

## 2017-08-29 PROCEDURE — 97803 MED NUTRITION INDIV SUBSEQ: CPT | Performed by: DIETITIAN, REGISTERED

## 2017-08-29 PROCEDURE — 80202 ASSAY OF VANCOMYCIN: CPT

## 2017-08-29 PROCEDURE — 94761 N-INVAS EAR/PLS OXIMETRY MLT: CPT

## 2017-08-29 PROCEDURE — 80197 ASSAY OF TACROLIMUS: CPT

## 2017-08-29 PROCEDURE — 63600175 PHARM REV CODE 636 W HCPCS: Performed by: INTERNAL MEDICINE

## 2017-08-29 PROCEDURE — 94640 AIRWAY INHALATION TREATMENT: CPT

## 2017-08-29 PROCEDURE — 25000003 PHARM REV CODE 250: Performed by: INTERNAL MEDICINE

## 2017-08-29 PROCEDURE — 20600001 HC STEP DOWN PRIVATE ROOM

## 2017-08-29 PROCEDURE — 25000242 PHARM REV CODE 250 ALT 637 W/ HCPCS: Performed by: INTERNAL MEDICINE

## 2017-08-29 PROCEDURE — 99231 SBSQ HOSP IP/OBS SF/LOW 25: CPT | Mod: ,,, | Performed by: NURSE PRACTITIONER

## 2017-08-29 PROCEDURE — 63600175 PHARM REV CODE 636 W HCPCS: Performed by: NURSE PRACTITIONER

## 2017-08-29 PROCEDURE — 25000242 PHARM REV CODE 250 ALT 637 W/ HCPCS: Performed by: NURSE PRACTITIONER

## 2017-08-29 PROCEDURE — 92522 EVALUATE SPEECH PRODUCTION: CPT

## 2017-08-29 PROCEDURE — 94668 MNPJ CHEST WALL SBSQ: CPT

## 2017-08-29 PROCEDURE — 25000003 PHARM REV CODE 250: Performed by: NURSE PRACTITIONER

## 2017-08-29 PROCEDURE — 85025 COMPLETE CBC W/AUTO DIFF WBC: CPT

## 2017-08-29 RX ADMIN — LEVALBUTEROL 1.25 MG: 1.25 SOLUTION, CONCENTRATE RESPIRATORY (INHALATION) at 09:08

## 2017-08-29 RX ADMIN — CIPROFLOXACIN 400 MG: 2 INJECTION, SOLUTION INTRAVENOUS at 03:08

## 2017-08-29 RX ADMIN — TACROLIMUS 1 MG: 1 CAPSULE ORAL at 05:08

## 2017-08-29 RX ADMIN — PREDNISONE 40 MG: 20 TABLET ORAL at 08:08

## 2017-08-29 RX ADMIN — TACROLIMUS 1 MG: 1 CAPSULE ORAL at 08:08

## 2017-08-29 RX ADMIN — LEVALBUTEROL 1.25 MG: 1.25 SOLUTION, CONCENTRATE RESPIRATORY (INHALATION) at 01:08

## 2017-08-29 RX ADMIN — VANCOMYCIN HYDROCHLORIDE 1250 MG: 5 INJECTION, POWDER, LYOPHILIZED, FOR SOLUTION INTRAVENOUS at 11:08

## 2017-08-29 RX ADMIN — LEVALBUTEROL 1.25 MG: 1.25 SOLUTION, CONCENTRATE RESPIRATORY (INHALATION) at 03:08

## 2017-08-29 RX ADMIN — LEVALBUTEROL 1.25 MG: 1.25 SOLUTION, CONCENTRATE RESPIRATORY (INHALATION) at 08:08

## 2017-08-29 RX ADMIN — VALGANCICLOVIR 450 MG: 450 TABLET, FILM COATED ORAL at 08:08

## 2017-08-29 RX ADMIN — DORNASE ALFA 2.5 MG: 1 SOLUTION RESPIRATORY (INHALATION) at 08:08

## 2017-08-29 RX ADMIN — GUAIFENESIN 600 MG: 600 TABLET, EXTENDED RELEASE ORAL at 08:08

## 2017-08-29 RX ADMIN — CHLORHEXIDINE GLUCONATE 15 ML: 1.2 RINSE ORAL at 09:08

## 2017-08-29 RX ADMIN — ASPIRIN 81 MG CHEWABLE TABLET 81 MG: 81 TABLET CHEWABLE at 08:08

## 2017-08-29 RX ADMIN — GUAIFENESIN 600 MG: 600 TABLET, EXTENDED RELEASE ORAL at 09:08

## 2017-08-29 RX ADMIN — CHLORHEXIDINE GLUCONATE 15 ML: 1.2 RINSE ORAL at 08:08

## 2017-08-29 RX ADMIN — CIPROFLOXACIN 400 MG: 2 INJECTION, SOLUTION INTRAVENOUS at 02:08

## 2017-08-29 RX ADMIN — VANCOMYCIN HYDROCHLORIDE 1250 MG: 5 INJECTION, POWDER, LYOPHILIZED, FOR SOLUTION INTRAVENOUS at 10:08

## 2017-08-29 RX ADMIN — FLUCONAZOLE 400 MG: 200 TABLET ORAL at 08:08

## 2017-08-29 RX ADMIN — FAMOTIDINE 20 MG: 20 TABLET, FILM COATED ORAL at 09:08

## 2017-08-29 RX ADMIN — MYCOPHENOLATE MOFETIL 500 MG: 250 CAPSULE ORAL at 08:08

## 2017-08-29 RX ADMIN — FAMOTIDINE 20 MG: 20 TABLET, FILM COATED ORAL at 08:08

## 2017-08-29 RX ADMIN — INSULIN ASPART 2 UNITS: 100 INJECTION, SOLUTION INTRAVENOUS; SUBCUTANEOUS at 05:08

## 2017-08-29 RX ADMIN — OXYCODONE HYDROCHLORIDE AND ACETAMINOPHEN 1 TABLET: 7.5; 325 TABLET ORAL at 03:08

## 2017-08-29 RX ADMIN — DORNASE ALFA 2.5 MG: 1 SOLUTION RESPIRATORY (INHALATION) at 10:08

## 2017-08-29 RX ADMIN — VALGANCICLOVIR 450 MG: 450 TABLET, FILM COATED ORAL at 09:08

## 2017-08-29 RX ADMIN — OXYCODONE HYDROCHLORIDE AND ACETAMINOPHEN 1 TABLET: 7.5; 325 TABLET ORAL at 09:08

## 2017-08-29 RX ADMIN — ENOXAPARIN SODIUM 40 MG: 100 INJECTION SUBCUTANEOUS at 05:08

## 2017-08-29 RX ADMIN — MYCOPHENOLATE MOFETIL 500 MG: 250 CAPSULE ORAL at 09:08

## 2017-08-29 NOTE — PROGRESS NOTES
Provided the patient and his daughter with post-lung transplant information.  Instructed them to read the material in preparation for in-depth teaching sessions to be conducted by lung transplant team members prior to discharge.  The patient and his daughter asked questions, which were answered to their satisfaction.  Both verbalized their agreement to review the transplant information.

## 2017-08-29 NOTE — ASSESSMENT & PLAN NOTE
There is no evidence of graft dysfunction. Currently on 2L NC.  Will wean oxygen as tolerated.  Continue CPT, incentive spirometer, and bronchodilators. Continue to monitor pleural chest tube output.

## 2017-08-29 NOTE — PT/OT/SLP EVAL
Speech Language Pathology   Voice Evaluation    Martin Hendrix Jr.   MRN: [id number]   Admitting Diagnosis: Lung transplanted    Diet recommendations: Solid Diet Level: Regular  Liquid Diet Level: Thin Feed only when awake/alert, No straws, HOB to 90 degrees, Small bites/sips, 1 bite/sip at a time, Eliminate distractions and Avoid talking while eating  Continue to monitor for signs and symptoms of aspiration and discontinue oral feeding should you notice any of the following: watery eyes, reddened facial area, wet vocal quality, increased work of breathing, change in respiratory status, increased congestion, coughing, fever, etc.    SLP Treatment Date: 08/29/17  Speech Start Time: 1358   /16:25  Speech Stop Time: 1408   /16:45  Speech Total (min): 10 min   / 20 minutes  +pt seen for 2 sessions today 2/2 SLP called for STAT MBSSand continues assessment later service day.     TREATMENT BILLABLE MINUTES:  Eval 30     Diagnosis: Lung transplanted  Diagnoses of Sarcoidosis, Adrenal cortical steroids causing adverse effect in therapeutic use, initial encounter, Hyperglycemia, Immunosuppression, Non morbid obesity due to excess calories, S/P lung transplant, Hypokalemia, Hypotension, unspecified hypotension type, Leukocytosis, unspecified type, Lung transplanted, Metabolic acidosis, Prophylactic antibiotic, AVILA (acute kidney injury), Paroxysmal atrial fibrillation, Dysphonia, Hyperkalemia, and Hyponatremia were pertinent to this visit.      Past Medical History:   Diagnosis Date    Atrial fibrillation 8/23/2017    On home oxygen therapy     KRISTYN on CPAP     Osteopenia     Pulmonary hypertension     Sarcoidosis     Shortness of breath      Past Surgical History:   Procedure Laterality Date    ABDOMINAL SURGERY      6 weeks old    BRONCHOSCOPY      CHEST TUBE INSERTION      CHOLECYSTECTOMY      LUNG BIOPSY         Has the patient been evaluated by SLP for swallowing? : Yes  Keep patient NPO?: No   General  "Precautions: Standard, aspiration, fall, sternal    Current Respiratory Status: nasal cannula     Intubation history: 8/22/17 - 8/23/17    CXR 8/29: Impression abnormal study similar to prior.  Some patchy opacity developing at the left base.  Developing infiltrate not excluded.    Other: ENT evaluated and pt found to have L TVC hypo-mobility      Social Hx: Patient lives alone in house, reports he will have his daughter to help him when he leaves hospital    Subjective:  SLP reviews patient with nurse, Patients nurse explains patient scheduled for re-consult with ENT 2/2 persistent Dysphonia  Patient presents calm and cooperative  He explains, "I feel like it [voice] is getting a little better ea day, but it is just aggravating"  Patient goals: "to talk again"    Pain/Comfort  Pain Rating 1: 0/10  Pain Rating Post-Intervention 1: 0/10     Objective:   Patient found with: chest tube, telemetry, oxygen. Pt seen sitting up in chair in room.   Oral Musculature Evaluation  Oral Musculature: WFL  Dentition: present and adequate  Mucosal Quality: good  Mandibular Strength and Mobility: WFL  Oral Labial Strength and Mobility: WFL  Lingual Strength and Mobility: WFL  Velar Elevation: WFL  Buccal Strength and Mobility: WFL  Volitional Cough: fair; reduced force due to sternal pain upon cough  Volitional Swallow: elicited  Voice Prior to PO Intake: hoarse, breathy - ENT evaluated and pt found to have L TVC hypo-mobility     Voice Evaluation:     History of voice disturbance:  Patient reports normal vocal quality prior to hospitalization.  Patient reports new onset s/p intubation/extubation for bilateral lung transplant  Intubation history:8/22/17 - 8/23/17  ENT assessment/intervention:  Flexible nasolaryngoscopy completed revealing LTVC hypomobility   Vocal abuse/misuse:  Patient reports he consumes on average 1-2 8oz cups of caffeine per day. Patient denies smoking.   Vocal Quality: Breathy, Aphonic at times  Loudness:  " Severe impairment. Not formally assessed; however, for conversational exchanges, speaker unable to be heard over background noise.  Attentive conversational partner warranted.    Breathiness: Moderate impairment, seen with nasal canula  Pitch:  Minimal; variation for age and gender   Strain:  Mild neck/clavicular tension noted with increased work of breathing at the conversational level  Maximum phonation time:  3.4 seconds across 3 elicitations of sustained phonation sound /a/  S/Z ratio: 2.83 (norm 1.0-1.44)   Overall severity: Severe Dysphonia   Therapeutic intervention:  Review of vocal function exercises, diaphragmatic breathing, and respiratory re-training.  Response to treatment: Patient demonstrates moderate difficulty with pitch glides on /knoll/ with MAX A. Ongoing review of respiratory re-training warranted.     Additional Treatment: Patient and daughter educated on SLP role, ongoing aspiration precautions, phonatory and respiratory systems, vocal hygiene, voice exercise regimen, vocal rest, ongoing follow up with ENT, and ongoing follow up with SLP following d/c from acute. Patient and daughter verbalize partial understanding and would benefit from ongoing review in session     Assessment:  Martin Hendrix . is a 56 y.o. 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 POC updated and ST to continue to follow.      Discharge recommendations: Discharge Facility/Level Of Care Needs: home health speech therapy     Goals:    SLP Goals        Problem: SLP Goal    Goal Priority Disciplines Outcome   SLP Goal     SLP Ongoing (interventions implemented as appropriate)   Description:  Speech Language Pathology Goals  Goals expected to be met by 9/1:  1. Pt will tolerate regular consistencies and thin liquids without overt s/s of aspiration, MOD I  2. Pt will participate in Modified Barium Swallow Study to r/o aspiration and determine safest PO diet. MET 8/25  3. Educate Patient on  aspiration precautions and S/S aspiration   4. Educate patient on anatomy and physiology of vocal system and how vocal pathology affects voice production  5. Pt will complete vocal fold adduction and vocal function exercises with good effort 90% of time, Supervision, to improve voice production                               Plan:   Patient to be seen Therapy Frequency: 5 x/week   Plan of Care expires: 09/23/17  Plan of Care reviewed with: patient, daughter  SLP Follow-up?: Yes         SILVIANO Garcia, [facility]-SLP  Speech-Language Pathology  Pager: [phone]  8/29/2017

## 2017-08-29 NOTE — SUBJECTIVE & OBJECTIVE
"Interval HPI:   Tolerating diet, bg at goal with no insulin, no hypoglycemia     /79 (BP Location: Left arm, Patient Position: Sitting)   Pulse 69   Temp 98.3 °F (36.8 °C) (Oral)   Resp 17   Ht 5' 10" (1.778 m)   Wt 104.8 kg (231 lb 0.7 oz)   SpO2 100%   BMI 33.15 kg/m²     Labs Reviewed and Include      Recent Labs  Lab 08/29/17  0431   *   CALCIUM 7.8*   ALBUMIN 2.3*   PROT 4.9*   *   K 5.5*   CO2 33*   CL 95   BUN 48*   CREATININE 1.3   ALKPHOS 51*   ALT 14   AST 17   BILITOT 0.6     Lab Results   Component Value Date    WBC 14.16 (H) 08/29/2017    HGB 10.5 (L) 08/29/2017    HCT 30.2 (L) 08/29/2017    MCV 90 08/29/2017     08/29/2017     No results for input(s): TSH, FREET4 in the last 168 hours.  Lab Results   Component Value Date    HGBA1C 6.7 (H) 04/24/2017       Nutritional status:   Body mass index is 33.15 kg/m².  Lab Results   Component Value Date    ALBUMIN 2.3 (L) 08/29/2017    ALBUMIN 2.4 (L) 08/28/2017    ALBUMIN 2.6 (L) 08/27/2017     No results found for: PREALBUMIN    Estimated Creatinine Clearance: 78.7 mL/min (based on Cr of 1.3).    Accu-Checks  Recent Labs      08/26/17   1716  08/26/17   2308  08/27/17   0741  08/27/17   1215  08/27/17   1651  08/27/17   2124  08/28/17   0808  08/28/17   1259  08/28/17   1803  08/28/17   2042   POCTGLUCOSE  216*  177*  121*  176*  195*  163*  87  128*  143*  251*       Current Medications and/or Treatments Impacting Glycemic Control  Immunotherapy:  Immunosuppressants         Stop Route Frequency     mycophenolate capsule 500 mg      -- Oral 2 times daily     tacrolimus capsule 1 mg      -- Oral 2 times daily        Steroids:   Hormones     Start     Stop Route Frequency Ordered    08/29/17 0900  predniSONE tablet 40 mg      -- Oral Daily 08/23/17 0808        Pressors:    Autonomic Drugs     None        Hyperglycemia/Diabetes Medications: Antihyperglycemics     Start     Stop Route Frequency Ordered    08/26/17 0800  insulin " aspart pen 2 Units      08/27 0714 SubQ with breakfast 08/26/17 0843    08/24/17 1410  insulin aspart pen 0-4 Units      -- SubQ As needed (PRN) 08/24/17 1311

## 2017-08-29 NOTE — ASSESSMENT & PLAN NOTE
Nutrition Diagnosis:  Altered GI function    Related to (etiology):   Difficulty swallowing 2' pain    Signs and Symptoms (as evidenced by):   Pt s/p lung tx     Interventions/Recommendations (treatment strategy):  See RD recs above.     Nutrition Diagnosis Status:   Improving

## 2017-08-29 NOTE — ASSESSMENT & PLAN NOTE
Bactrim, valganciclovir, and diflucan for opportunistic infection prophylaxis. Continue ciprofloxacin; and stop Ancef, start Vancomycin.

## 2017-08-29 NOTE — SUBJECTIVE & OBJECTIVE
Subjective:     Interval History: No acute events overnight.      Continuous Infusions:   Scheduled Meds:   aspirin  81 mg Oral Daily    chlorhexidine  15 mL Mouth/Throat BID    ciprofloxacin  400 mg Intravenous Q12H    dornase alpha  2.5 mg Inhalation BID    enoxaparin  40 mg Subcutaneous Daily    famotidine  20 mg Oral BID    fluconazole  400 mg Oral Daily    guaifenesin  600 mg Oral BID    levalbuterol  1.25 mg Nebulization Q8H    mycophenolate  500 mg Oral BID    predniSONE  40 mg Oral Daily    sulfamethoxazole-trimethoprim 800-160mg  1 tablet Oral Every Mon, Wed, Fri    tacrolimus  1 mg Oral BID    valganciclovir  450 mg Oral BID    vancomycin (VANCOCIN) IVPB  1,250 mg Intravenous Q12H     PRN Meds:acetaminophen, dextrose 50%, dextrose 50%, glucagon (human recombinant), glucose, glucose, HYDROmorphone, insulin aspart, magnesium sulfate IVPB **AND** magnesium sulfate IVPB, ondansetron, oxycodone-acetaminophen, potassium chloride 10% **AND** potassium chloride 10% **AND** potassium chloride 10%, promethazine (PHENERGAN) IVPB, tramadol    Review of patient's allergies indicates:  No Known Allergies    Review of Systems   Constitutional: Negative for appetite change, chills, fatigue, fever and unexpected weight change.   HENT: Positive for voice change. Negative for congestion, ear pain, hearing loss, mouth sores and postnasal drip.    Eyes: Negative for photophobia, pain, discharge, redness, itching and visual disturbance.   Respiratory: Negative for cough, chest tightness and shortness of breath.    Cardiovascular: Positive for chest pain (at incision sites) and leg swelling. Negative for palpitations.   Gastrointestinal: Negative for abdominal distention, abdominal pain, blood in stool, constipation and diarrhea.   Endocrine: Negative for cold intolerance, heat intolerance, polydipsia, polyphagia and polyuria.   Genitourinary: Negative for decreased urine volume, difficulty urinating, dysuria,  frequency, hematuria and urgency.   Musculoskeletal: Negative for arthralgias and joint swelling.   Allergic/Immunologic: Positive for immunocompromised state. Negative for environmental allergies and food allergies.   Neurological: Positive for weakness (due to debility). Negative for dizziness, tremors, syncope, speech difficulty and numbness.   Hematological: Negative for adenopathy. Does not bruise/bleed easily.   Psychiatric/Behavioral: Negative for agitation, confusion and hallucinations.     Objective:   Physical Exam   Constitutional: He is oriented to person, place, and time. He appears well-developed and well-nourished. No distress.   HENT:   Head: Normocephalic and atraumatic.   Nose: Nose normal.   Mouth/Throat: Oropharynx is clear and moist. No oropharyngeal exudate.   Eyes: Conjunctivae and EOM are normal. Pupils are equal, round, and reactive to light. No scleral icterus.   Neck: Normal range of motion. Neck supple. No JVD present. No tracheal deviation present. No thyromegaly present.   Cardiovascular: Normal rate, regular rhythm and normal heart sounds.    Pulmonary/Chest: Effort normal and breath sounds normal. No respiratory distress. He has no wheezes. He has no rales. He exhibits no tenderness.   Chest tubes in place with serosanguinous output and no air leaks.   Abdominal: Soft. Bowel sounds are normal. He exhibits no distension. There is no tenderness.   Musculoskeletal: Normal range of motion. He exhibits edema (trace BLE). He exhibits no tenderness.   Neurological: He is alert and oriented to person, place, and time. No cranial nerve deficit.   Skin: Skin is warm and dry. He is not diaphoretic.   Psychiatric: He has a normal mood and affect. Thought content normal.         Vital Signs (Most Recent):  Temp: 98.3 °F (36.8 °C) (08/29/17 0736)  Pulse: 78 (08/29/17 1100)  Resp: 17 (08/29/17 0840)  BP: 137/79 (08/29/17 0736)  SpO2: 100 % (08/29/17 0840) Vital Signs (24h Range):  Temp:  [97.7 °F  (36.5 °C)-98.7 °F (37.1 °C)] 98.3 °F (36.8 °C)  Pulse:  [64-83] 78  Resp:  [16-20] 17  SpO2:  [95 %-100 %] 100 %  BP: (123-144)/(75-82) 137/79     Weight: 104.8 kg (231 lb 0.7 oz)  Body mass index is 33.15 kg/m².      Intake/Output Summary (Last 24 hours) at 08/29/17 1121  Last data filed at 08/29/17 0900   Gross per 24 hour   Intake             1910 ml   Output             4475 ml   Net            -2565 ml       Significant Labs:  CBC:    Recent Labs  Lab 08/29/17  0431   WBC 14.16*   RBC 3.34*   HGB 10.5*   HCT 30.2*      MCV 90   MCH 31.4*   MCHC 34.8     BMP:    Recent Labs  Lab 08/29/17  0431   *   K 5.5*   CL 95   CO2 33*   BUN 48*   CREATININE 1.3   CALCIUM 7.8*      Tacrolimus Levels:    Recent Labs  Lab 08/29/17  0431   TACROLIMUS 6.4     Microbiology:  Microbiology Results (last 7 days)     Procedure Component Value Units Date/Time    Culture, Anaerobe [[id number]] Collected:  08/22/17 0814    Order Status:  Completed Specimen:  Tissue from Lung, Lingula Updated:  08/28/17 0922     Anaerobic Culture No anaerobes isolated    Narrative:       Bronchus of donor lung    Culture, Respiratory [[id number]] Collected:  08/23/17 0957    Order Status:  Completed Specimen:  Respiratory from Bronchial Wash Updated:  08/25/17 1103     Respiratory Culture --     STAPHYLOCOCCUS AUREUS  Moderate  Normal respiratory mary also present       Gram Stain (Respiratory) Few WBC's     Gram Stain (Respiratory) No organisms seen    Narrative:       Bronchial Wash    AFB Culture & Smear [[id number]] Collected:  08/23/17 0957    Order Status:  Completed Specimen:  Respiratory from Bronchial Wash Updated:  08/24/17 2127     AFB Culture & Smear Culture in progress     AFB CULTURE STAIN No acid fast bacilli seen.    Narrative:       Bronchial Wash    Aerobic culture [[id number]]  (Susceptibility) Collected:  08/22/17 0816    Order Status:  Completed Specimen:  Tissue from Lung, Lingula Updated:  08/24/17 1304     Aerobic  Bacterial Culture --     STAPHYLOCOCCUS AUREUS  Few      Narrative:       Bronchus of donor lung    Fungus culture [[id number]] Collected:  08/22/17 0815    Order Status:  Completed Specimen:  Tissue from Lung, Lingula Updated:  08/23/17 1358     Fungus (Mycology) Culture Culture in progress    Narrative:       Bronchus of donor lung    AFB Culture & Smear [[id number]] Collected:  08/22/17 0814    Order Status:  Completed Specimen:  Tissue from Lung, Lingula Updated:  08/23/17 1354     AFB Culture & Smear Culture in progress     AFB CULTURE STAIN No acid fast bacilli seen.    Narrative:       Bronchus of donor lung    Fungus culture [[id number]] Collected:  08/23/17 0957    Order Status:  Sent Specimen:  Respiratory from Bronchial Wash Updated:  08/23/17 1241    Urine culture [[id number]] Collected:  08/21/17 2027    Order Status:  Completed Specimen:  Urine from Urine, Clean Catch Updated:  08/22/17 2333     Urine Culture, Routine No growth          I have reviewed all pertinent labs within the past 24 hours.    Diagnostic Results:

## 2017-08-29 NOTE — PHYSICIAN QUERY
PT Name: Martin Hendrix Jr.  MR #: [id number]    Physician Query Form - Atrial Fibrillation Specificity     CDS/: Minh Mattson Jr, RN              Contact information:[email or web address]     This form is a permanent document in the medical record.     Query Date: August 29, 2017    By submitting this query, we are merely seeking further clarification of documentation. Please utilize your independent clinical judgment when addressing the question(s) below.    The medical record contains the following:   Indicators     Supporting Clinical Findings Location in Medical Record   x Atrial Fibrillation  Atrial fibrillation    Currently in NSR 8/28 Lung Transplant    EKG results      Medication     x Treatment Pt maintained on amiodarone at 0.5 for Afib.   8/24 Nursing Progress Note    Other         Provider, please further specify the Atrial Fibrillation diagnosis.    [ x ] Paroxysmal  [  ] Other (please specify): ____________________________  [  ] Clinically Undetermined    Please document in your progress notes daily for the duration of treatment until resolved, and include in your discharge summary.

## 2017-08-29 NOTE — PLAN OF CARE
Problem: SLP Goal  Goal: SLP Goal  Speech Language Pathology Goals  Goals expected to be met by 9/1:  1. Pt will tolerate regular consistencies and thin liquids without overt s/s of aspiration, MOD I  2. Pt will participate in Modified Barium Swallow Study to r/o aspiration and determine safest PO diet. MET 8/25  3. Educate Patient on aspiration precautions and S/S aspiration   4. Educate patient on anatomy and physiology of vocal system and how vocal pathology affects voice production  5. Pt will complete vocal fold adduction and vocal function exercises with good effort 90% of time, Supervision, to improve voice production            Outcome: Ongoing (interventions implemented as appropriate)  Voice evaluation completed. Patient with severe dysphonia secondary to LTVF hypomobility. POC updated and ST to continue to follow. See note for full details. Ongoing f/u with ENT warranted. Thank you.    SILVIO Garcia., CCC-SLP  Speech-Language Pathology  Pager: [phone]  8/29/2017

## 2017-08-29 NOTE — PROGRESS NOTES
[facility]  Lung Transplant  Progress Note - Floor    Patient Name: Martin Hendrix Jr.  MRN: [id number]  Admission Date: 8/21/2017  Hospital Length of Stay: 8 days  Post-Operative Day: 7  Attending Physician: Darrick Wolfe MD  Primary Care Provider: Sukhi Dunham Jr, MD     Subjective:     Interval History: No acute events overnight.      Continuous Infusions:   Scheduled Meds:   aspirin  81 mg Oral Daily    chlorhexidine  15 mL Mouth/Throat BID    ciprofloxacin  400 mg Intravenous Q12H    dornase alpha  2.5 mg Inhalation BID    enoxaparin  40 mg Subcutaneous Daily    famotidine  20 mg Oral BID    fluconazole  400 mg Oral Daily    guaifenesin  600 mg Oral BID    levalbuterol  1.25 mg Nebulization Q8H    mycophenolate  500 mg Oral BID    predniSONE  40 mg Oral Daily    sulfamethoxazole-trimethoprim 800-160mg  1 tablet Oral Every Mon, Wed, Fri    tacrolimus  1 mg Oral BID    valganciclovir  450 mg Oral BID    vancomycin (VANCOCIN) IVPB  1,250 mg Intravenous Q12H     PRN Meds:acetaminophen, dextrose 50%, dextrose 50%, glucagon (human recombinant), glucose, glucose, HYDROmorphone, insulin aspart, magnesium sulfate IVPB **AND** magnesium sulfate IVPB, ondansetron, oxycodone-acetaminophen, potassium chloride 10% **AND** potassium chloride 10% **AND** potassium chloride 10%, promethazine (PHENERGAN) IVPB, tramadol    Review of patient's allergies indicates:  No Known Allergies    Review of Systems   Constitutional: Negative for appetite change, chills, fatigue, fever and unexpected weight change.   HENT: Positive for voice change. Negative for congestion, ear pain, hearing loss, mouth sores and postnasal drip.    Eyes: Negative for photophobia, pain, discharge, redness, itching and visual disturbance.   Respiratory: Negative for cough, chest tightness and shortness of breath.    Cardiovascular: Positive for chest pain (at incision sites) and leg swelling. Negative for palpitations.    Gastrointestinal: Negative for abdominal distention, abdominal pain, blood in stool, constipation and diarrhea.   Endocrine: Negative for cold intolerance, heat intolerance, polydipsia, polyphagia and polyuria.   Genitourinary: Negative for decreased urine volume, difficulty urinating, dysuria, frequency, hematuria and urgency.   Musculoskeletal: Negative for arthralgias and joint swelling.   Allergic/Immunologic: Positive for immunocompromised state. Negative for environmental allergies and food allergies.   Neurological: Positive for weakness (due to debility). Negative for dizziness, tremors, syncope, speech difficulty and numbness.   Hematological: Negative for adenopathy. Does not bruise/bleed easily.   Psychiatric/Behavioral: Negative for agitation, confusion and hallucinations.     Objective:   Physical Exam   Constitutional: He is oriented to person, place, and time. He appears well-developed and well-nourished. No distress.   HENT:   Head: Normocephalic and atraumatic.   Nose: Nose normal.   Mouth/Throat: Oropharynx is clear and moist. No oropharyngeal exudate.   Eyes: Conjunctivae and EOM are normal. Pupils are equal, round, and reactive to light. No scleral icterus.   Neck: Normal range of motion. Neck supple. No JVD present. No tracheal deviation present. No thyromegaly present.   Cardiovascular: Normal rate, regular rhythm and normal heart sounds.    Pulmonary/Chest: Effort normal and breath sounds normal. No respiratory distress. He has no wheezes. He has no rales. He exhibits no tenderness.   Chest tubes in place with serosanguinous output and no air leaks.   Abdominal: Soft. Bowel sounds are normal. He exhibits no distension. There is no tenderness.   Musculoskeletal: Normal range of motion. He exhibits edema (trace BLE). He exhibits no tenderness.   Neurological: He is alert and oriented to person, place, and time. No cranial nerve deficit.   Skin: Skin is warm and dry. He is not diaphoretic.    Psychiatric: He has a normal mood and affect. Thought content normal.         Vital Signs (Most Recent):  Temp: 98.3 °F (36.8 °C) (08/29/17 0736)  Pulse: 78 (08/29/17 1100)  Resp: 17 (08/29/17 0840)  BP: 137/79 (08/29/17 0736)  SpO2: 100 % (08/29/17 0840) Vital Signs (24h Range):  Temp:  [97.7 °F (36.5 °C)-98.7 °F (37.1 °C)] 98.3 °F (36.8 °C)  Pulse:  [64-83] 78  Resp:  [16-20] 17  SpO2:  [95 %-100 %] 100 %  BP: (123-144)/(75-82) 137/79     Weight: 104.8 kg (231 lb 0.7 oz)  Body mass index is 33.15 kg/m².      Intake/Output Summary (Last 24 hours) at 08/29/17 1121  Last data filed at 08/29/17 0900   Gross per 24 hour   Intake             1910 ml   Output             4475 ml   Net            -2565 ml       Significant Labs:  CBC:    Recent Labs  Lab 08/29/17  0431   WBC 14.16*   RBC 3.34*   HGB 10.5*   HCT 30.2*      MCV 90   MCH 31.4*   MCHC 34.8     BMP:    Recent Labs  Lab 08/29/17  0431   *   K 5.5*   CL 95   CO2 33*   BUN 48*   CREATININE 1.3   CALCIUM 7.8*      Tacrolimus Levels:    Recent Labs  Lab 08/29/17  0431   TACROLIMUS 6.4     Microbiology:  Microbiology Results (last 7 days)     Procedure Component Value Units Date/Time    Culture, Anaerobe [[id number]] Collected:  08/22/17 0814    Order Status:  Completed Specimen:  Tissue from Lung, Lingula Updated:  08/28/17 0922     Anaerobic Culture No anaerobes isolated    Narrative:       Bronchus of donor lung    Culture, Respiratory [[id number]] Collected:  08/23/17 0957    Order Status:  Completed Specimen:  Respiratory from Bronchial Wash Updated:  08/25/17 1103     Respiratory Culture --     STAPHYLOCOCCUS AUREUS  Moderate  Normal respiratory mary also present       Gram Stain (Respiratory) Few WBC's     Gram Stain (Respiratory) No organisms seen    Narrative:       Bronchial Wash    AFB Culture & Smear [[id number]] Collected:  08/23/17 0957    Order Status:  Completed Specimen:  Respiratory from Bronchial Wash Updated:  08/24/17 2127     AFB  Culture & Smear Culture in progress     AFB CULTURE STAIN No acid fast bacilli seen.    Narrative:       Bronchial Wash    Aerobic culture [[id number]]  (Susceptibility) Collected:  08/22/17 0816    Order Status:  Completed Specimen:  Tissue from Lung, Lingula Updated:  08/24/17 1304     Aerobic Bacterial Culture --     STAPHYLOCOCCUS AUREUS  Few      Narrative:       Bronchus of donor lung    Fungus culture [[id number]] Collected:  08/22/17 0815    Order Status:  Completed Specimen:  Tissue from Lung, Lingula Updated:  08/23/17 1358     Fungus (Mycology) Culture Culture in progress    Narrative:       Bronchus of donor lung    AFB Culture & Smear [[id number]] Collected:  08/22/17 0814    Order Status:  Completed Specimen:  Tissue from Lung, Lingula Updated:  08/23/17 1354     AFB Culture & Smear Culture in progress     AFB CULTURE STAIN No acid fast bacilli seen.    Narrative:       Bronchus of donor lung    Fungus culture [[id number]] Collected:  08/23/17 0957    Order Status:  Sent Specimen:  Respiratory from Bronchial Wash Updated:  08/23/17 1241    Urine culture [[id number]] Collected:  08/21/17 2027    Order Status:  Completed Specimen:  Urine from Urine, Clean Catch Updated:  08/22/17 2333     Urine Culture, Routine No growth          I have reviewed all pertinent labs within the past 24 hours.    Diagnostic Results:        Assessment/Plan:     * Lung transplanted    There is no evidence of graft dysfunction. Currently on 2L NC.  Will wean oxygen as tolerated.  Continue CPT, incentive spirometer, and bronchodilators. Continue to monitor pleural chest tube output.        Immunosuppression    Solu-Medrol administered in the OR. Basiliximab in the ICU. Continue tacrolimus, mycophenolate, and corticoteroid taper         Prophylactic antibiotic    Bactrim, valganciclovir, and diflucan for opportunistic infection prophylaxis. Continue ciprofloxacin; and stop Ancef, start Vancomycin.          Leukocytosis    Expected  after transplant. Will continue to monitor.         Hyperglycemia    Appreciate input from endocrinology        Paroxysmal atrial fibrillation    Currently in NSR.          Dysphonia    Secondary to left vocal cord dysfunction. SLP cleared him for a regular diet. ENT following        AVILA (acute kidney injury)    Likely form ATN after surgery. His creatinine is improving and he is making good urine.        Hyponatremia    I suspect from AVILA, will continue to monitor.        Hyperkalemia    I suspect from AVILA, no interventions unless >6            Gayle EDUARDO Celestin NP  Lung Transplant  [facility]

## 2017-08-29 NOTE — PLAN OF CARE
Problem: Patient Care Overview  Goal: Plan of Care Review  Outcome: Ongoing (interventions implemented as appropriate)  Pt aao x 4. Patient currently in bedside chair with call light in reach. Free from falls/injury this shift and wearing non-skid footwear when ambulating. Daughter pulling self-meds 100%. Patient complying with sternal precautions. Speech by to work with patient. Patient has had no c/o pain. Patient has received insulin coverage for BG in 200s at dinnertime. VSS. Patient RA with O2 > 91. Patient using 2LNC PRN. Patient has ambulated in horn with this RN once today and has requested to ambulate in hallway again. UO 1600 cc. CT o/p = 180 cc. Pt continuing to receive IV ABX.     Will continue to monitor, assess, and adjust care as needed.

## 2017-08-29 NOTE — PLAN OF CARE
Problem: Patient Care Overview  Goal: Plan of Care Review  Pt aaox4 vswnl and no c/o pain. Daughter at bedside and pulled self meds 100%. Bed in low position and callbell within reach. Pt speaks in a whisper. No difficulty swallowing. Standard precautions and reverse isolation and contact precautions mrsa maintained. Pleural chest tube to waterseal patent/intact and dressing changed on dayshift. Pt turns independently and sternal precautions maintained.

## 2017-08-29 NOTE — PROGRESS NOTES
"[facility]  Endocrinology  Progress Note    Admit Date: 8/21/2017     Reason for Consult: Management of  Hyperglycemia     Surgical Procedure and Date:  8/22/17 s/p bilateral lung transplant       HPI:  Mr. 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 He is now s/p bilateral lung transplant with steroid induced hyperglycemia. Endocrine consulted for bg management.       Interval HPI:   Tolerating diet, bg at goal with no insulin, no hypoglycemia     /79 (BP Location: Left arm, Patient Position: Sitting)   Pulse 69   Temp 98.3 °F (36.8 °C) (Oral)   Resp 17   Ht 5' 10" (1.778 m)   Wt 104.8 kg (231 lb 0.7 oz)   SpO2 100%   BMI 33.15 kg/m²       Labs Reviewed and Include      Recent Labs  Lab 08/29/17  0431   *   CALCIUM 7.8*   ALBUMIN 2.3*   PROT 4.9*   *   K 5.5*   CO2 33*   CL 95   BUN 48*   CREATININE 1.3   ALKPHOS 51*   ALT 14   AST 17   BILITOT 0.6     Lab Results   Component Value Date    WBC 14.16 (H) 08/29/2017    HGB 10.5 (L) 08/29/2017    HCT 30.2 (L) 08/29/2017    MCV 90 08/29/2017     08/29/2017     No results for input(s): TSH, FREET4 in the last 168 hours.  Lab Results   Component Value Date    HGBA1C 6.7 (H) 04/24/2017       Nutritional status:   Body mass index is 33.15 kg/m².  Lab Results   Component Value Date    ALBUMIN 2.3 (L) 08/29/2017    ALBUMIN 2.4 (L) 08/28/2017    ALBUMIN 2.6 (L) 08/27/2017     No results found for: PREALBUMIN    Estimated Creatinine Clearance: 78.7 mL/min (based on Cr of 1.3).    Accu-Checks  Recent Labs      08/26/17   1716  08/26/17   2308  08/27/17   0741  08/27/17   1215  08/27/17   1651  08/27/17   2124  08/28/17   0808  08/28/17   1259  08/28/17   1803  08/28/17   2042   POCTGLUCOSE  216*  177*  121*  176*  195*  163*  87  128*  143*  251*       Current Medications and/or Treatments Impacting Glycemic Control  Immunotherapy:  Immunosuppressants         Stop Route Frequency "     mycophenolate capsule 500 mg      -- Oral 2 times daily     tacrolimus capsule 1 mg      -- Oral 2 times daily        Steroids:   Hormones     Start     Stop Route Frequency Ordered    08/29/17 0900  predniSONE tablet 40 mg      -- Oral Daily 08/23/17 0808        Pressors:    Autonomic Drugs     None        Hyperglycemia/Diabetes Medications: Antihyperglycemics     Start     Stop Route Frequency Ordered    08/26/17 0845  insulin aspart pen 2 Units      08/27 0714 SubQ with breakfast 08/26/17 0843    08/24/17 1410  insulin aspart pen 0-4 Units      -- SubQ As needed (PRN) 08/24/17 1311          ASSESSMENT and PLAN    Hyperglycemia    Resolving- will sign off  Please re-consult for changes         Immunosuppression    May increase insulin resistance.         Adrenal cortical steroids causing adverse effect in therapeutic use    Increasing insulin needs          Sarcoidosis    S/p lung txp 8/22/17           * Lung transplanted    Per DEVAN Gross, DNP, NP  Endocrinology  [facility]

## 2017-08-29 NOTE — PROGRESS NOTES
[facility]  Adult Nutrition  Progress Note    SUMMARY     Recommendations    Recommendation/Intervention: Recommend continuing current regular diet + Boost Glucose control strawberry TID. No further questions about transplant diet education. Encouraged optimal intake. RD following.     Goals: Pt to consume/tolerate > 75% EEN and EPN with knowledge of proper diet   Nutrition Goal Status: progressing towards goal  Communication of RD Recs: reviewed with RN      Reason for Assessment    Reason for Assessment: RD follow-up  Diagnosis: transplant/postoperative complications (Lung tx 8/22/2017)  Relevent Medical History: pulmonary sarcoidosis, pulmonary hypertension   Interdisciplinary Rounds: attended     General Information Comments: Pt appetite and intake continues to improve. Tolerating more consistencies. Throat still painful. Daughter at bedside, helping pt with ordering.     Nutrition Discharge Planning: Post transplant nutrition education provided. Food safety/drug interactions emphasized. General healthy diet recommended. RD contact name/info provided. All questions answered. No other needs identified. Caregiver present.     Nutrition Prescription Ordered    Current Diet Order: Regular        Evaluation of Received Nutrients/Fluid Intake    % Intake of Estimated Energy Needs: 50 - 75 %  % Meal Intake: 50%     Nutrition Risk Screen     Nutrition Risk Screen: no indicators present    Nutrition/Diet History    Patient Reported Diet/Restrictions/Preferences: general                               Labs/Tests/Procedures/Meds       Pertinent Labs Reviewed: reviewed  Pertinent Labs Comments: Na 132, K 5.5, bun 48, glu   Pertinent Medications Reviewed: reviewed  Pertinent Medications Comments: famotidine, insulin, prednisone, tacro    Physical Findings    Overall Physical Appearance: shortness of breath, overweight     Oral/Mouth Cavity: other (see comments) (difficulty speaking, coarse  "throat)  Skin: incision (chest)    Anthropometrics    Temp: 98.2 °F (36.8 °C)     Height: 5' 10" (177.8 cm)  Weight Method: Bed Scale  Weight: 104.8 kg (231 lb 0.7 oz)  Ideal Body Weight (IBW), Male: 166 lb     % Ideal Body Weight, Male (lb): 130.95 lb     BMI (Calculated): 31.3  BMI Grade: 30 - 34.9- obesity - grade I                            Estimated/Assessed Needs    Weight Used For Calorie Calculations: 106.2 kg (234 lb 2.1 oz)   Height (cm): 177.8 cm  Energy Calorie Requirements (kcal): 2385 kcal (1.25 PAL)  Energy Need Method: Sutton-St Jeor        RMR (Sutton-St. Jeor Equation): 1908.25        Weight Used For Protein Calculations: 106.2 kg (234 lb 2.1 oz)  Protein Requirements: 106-127 g/day (1-1.2 g/kg)     Fluid Need Method: RDA Method        RDA Method (mL): 2385       Assessment and Plan    * Lung transplanted    Nutrition Diagnosis:  Altered GI function    Related to (etiology):   Difficulty swallowing 2' pain    Signs and Symptoms (as evidenced by):   Pt s/p lung tx     Interventions/Recommendations (treatment strategy):  See RD recs above.     Nutrition Diagnosis Status:   Improving              Monitor and Evaluation    Food and Nutrient Intake: energy intake, food and beverage intake  Food and Nutrient Adminstration: diet order  Knowledge/Beliefs/Attitudes: food and nutrition knowledge/skill  Physical Activity and Function: nutrition-related ADLs and IADLs  Anthropometric Measurements: weight, body mass index, weight change  Biochemical Data, Medical Tests and Procedures: electrolyte and renal panel, gastrointestinal profile, glucose/endocrine profile, inflammatory profile, lipid profile  Nutrition-Focused Physical Findings: overall appearance    Nutrition Risk    Level of Risk:  (1x/week)    Nutrition Follow-Up    RD Follow-up?: Yes    "

## 2017-08-30 LAB
ALBUMIN SERPL BCP-MCNC: 2.4 G/DL
ALP SERPL-CCNC: 58 U/L
ALT SERPL W/O P-5'-P-CCNC: 16 U/L
ANION GAP SERPL CALC-SCNC: 7 MMOL/L
AST SERPL-CCNC: 19 U/L
BASOPHILS # BLD AUTO: 0.01 K/UL
BASOPHILS NFR BLD: 0.1 %
BILIRUB SERPL-MCNC: 0.7 MG/DL
BUN SERPL-MCNC: 37 MG/DL
CALCIUM SERPL-MCNC: 8.3 MG/DL
CHLORIDE SERPL-SCNC: 94 MMOL/L
CO2 SERPL-SCNC: 30 MMOL/L
CREAT SERPL-MCNC: 1 MG/DL
DIFFERENTIAL METHOD: ABNORMAL
EOSINOPHIL # BLD AUTO: 0.2 K/UL
EOSINOPHIL NFR BLD: 1.1 %
ERYTHROCYTE [DISTWIDTH] IN BLOOD BY AUTOMATED COUNT: 13.6 %
EST. GFR  (AFRICAN AMERICAN): >60 ML/MIN/1.73 M^2
EST. GFR  (NON AFRICAN AMERICAN): >60 ML/MIN/1.73 M^2
GLUCOSE SERPL-MCNC: 124 MG/DL
HCT VFR BLD AUTO: 31.1 %
HGB BLD-MCNC: 10.5 G/DL
LYMPHOCYTES # BLD AUTO: 1 K/UL
LYMPHOCYTES NFR BLD: 6.3 %
MAGNESIUM SERPL-MCNC: 2 MG/DL
MCH RBC QN AUTO: 30.5 PG
MCHC RBC AUTO-ENTMCNC: 33.8 G/DL
MCV RBC AUTO: 90 FL
MONOCYTES # BLD AUTO: 1.3 K/UL
MONOCYTES NFR BLD: 8.6 %
NEUTROPHILS # BLD AUTO: 12.4 K/UL
NEUTROPHILS NFR BLD: 82.9 %
PLATELET # BLD AUTO: 327 K/UL
PMV BLD AUTO: 8.3 FL
POCT GLUCOSE: 125 MG/DL (ref 70–110)
POCT GLUCOSE: 126 MG/DL (ref 70–110)
POCT GLUCOSE: 223 MG/DL (ref 70–110)
POCT GLUCOSE: 233 MG/DL (ref 70–110)
POCT GLUCOSE: 235 MG/DL (ref 70–110)
POTASSIUM SERPL-SCNC: 5.3 MMOL/L
PROT SERPL-MCNC: 5.3 G/DL
RBC # BLD AUTO: 3.44 M/UL
SODIUM SERPL-SCNC: 131 MMOL/L
TACROLIMUS BLD-MCNC: 7 NG/ML
WBC # BLD AUTO: 15 K/UL

## 2017-08-30 PROCEDURE — 25000003 PHARM REV CODE 250: Performed by: INTERNAL MEDICINE

## 2017-08-30 PROCEDURE — 63600175 PHARM REV CODE 636 W HCPCS: Performed by: INTERNAL MEDICINE

## 2017-08-30 PROCEDURE — 94667 MNPJ CHEST WALL 1ST: CPT

## 2017-08-30 PROCEDURE — 94640 AIRWAY INHALATION TREATMENT: CPT

## 2017-08-30 PROCEDURE — 85025 COMPLETE CBC W/AUTO DIFF WBC: CPT

## 2017-08-30 PROCEDURE — 25000242 PHARM REV CODE 250 ALT 637 W/ HCPCS: Performed by: NURSE PRACTITIONER

## 2017-08-30 PROCEDURE — 99900035 HC TECH TIME PER 15 MIN (STAT)

## 2017-08-30 PROCEDURE — 94668 MNPJ CHEST WALL SBSQ: CPT

## 2017-08-30 PROCEDURE — 20600001 HC STEP DOWN PRIVATE ROOM

## 2017-08-30 PROCEDURE — 99232 SBSQ HOSP IP/OBS MODERATE 35: CPT | Mod: ,,, | Performed by: INTERNAL MEDICINE

## 2017-08-30 PROCEDURE — 36415 COLL VENOUS BLD VENIPUNCTURE: CPT

## 2017-08-30 PROCEDURE — 80197 ASSAY OF TACROLIMUS: CPT

## 2017-08-30 PROCEDURE — 83735 ASSAY OF MAGNESIUM: CPT

## 2017-08-30 PROCEDURE — 27000190 HC CPAP FULL FACE MASK W/VALVE

## 2017-08-30 PROCEDURE — 92526 ORAL FUNCTION THERAPY: CPT

## 2017-08-30 PROCEDURE — 92507 TX SP LANG VOICE COMM INDIV: CPT

## 2017-08-30 PROCEDURE — 94761 N-INVAS EAR/PLS OXIMETRY MLT: CPT

## 2017-08-30 PROCEDURE — 25000003 PHARM REV CODE 250: Performed by: NURSE PRACTITIONER

## 2017-08-30 PROCEDURE — 27000221 HC OXYGEN, UP TO 24 HOURS

## 2017-08-30 PROCEDURE — 94660 CPAP INITIATION&MGMT: CPT

## 2017-08-30 PROCEDURE — 63600175 PHARM REV CODE 636 W HCPCS: Performed by: NURSE PRACTITIONER

## 2017-08-30 PROCEDURE — 25000242 PHARM REV CODE 250 ALT 637 W/ HCPCS: Performed by: INTERNAL MEDICINE

## 2017-08-30 PROCEDURE — 80053 COMPREHEN METABOLIC PANEL: CPT

## 2017-08-30 RX ORDER — ASPIRIN 81 MG/1
81 TABLET ORAL DAILY
Qty: 30 TABLET | Refills: 11 | Status: SHIPPED | OUTPATIENT
Start: 2017-08-30 | End: 2017-12-11 | Stop reason: SDUPTHER

## 2017-08-30 RX ORDER — SULFAMETHOXAZOLE AND TRIMETHOPRIM 800; 160 MG/1; MG/1
1 TABLET ORAL
Qty: 15 TABLET | Refills: 11 | Status: SHIPPED | OUTPATIENT
Start: 2017-08-30 | End: 2017-12-11 | Stop reason: SDUPTHER

## 2017-08-30 RX ORDER — LANOLIN ALCOHOL/MO/W.PET/CERES
400 CREAM (GRAM) TOPICAL 2 TIMES DAILY
Qty: 60 TABLET | Refills: 11 | Status: SHIPPED | OUTPATIENT
Start: 2017-08-30 | End: 2017-12-11 | Stop reason: SDUPTHER

## 2017-08-30 RX ORDER — VALGANCICLOVIR 450 MG/1
450 TABLET, FILM COATED ORAL 2 TIMES DAILY
Qty: 60 TABLET | Refills: 11 | Status: SHIPPED | OUTPATIENT
Start: 2017-08-30 | End: 2017-12-11 | Stop reason: SDUPTHER

## 2017-08-30 RX ORDER — FOLIC ACID 1 MG/1
1 TABLET ORAL DAILY
Status: DISCONTINUED | OUTPATIENT
Start: 2017-08-30 | End: 2017-09-01 | Stop reason: HOSPADM

## 2017-08-30 RX ORDER — TACROLIMUS 1 MG/1
2 CAPSULE ORAL EVERY 12 HOURS
Qty: 120 CAPSULE | Refills: 11 | Status: SHIPPED | OUTPATIENT
Start: 2017-08-30 | End: 2017-09-01

## 2017-08-30 RX ORDER — MYCOPHENOLATE MOFETIL 250 MG/1
500 CAPSULE ORAL 2 TIMES DAILY
Qty: 120 CAPSULE | Refills: 11 | Status: ON HOLD | OUTPATIENT
Start: 2017-08-30 | End: 2017-11-06

## 2017-08-30 RX ORDER — TACROLIMUS 0.5 MG/1
0.5 CAPSULE ORAL EVERY 12 HOURS
Qty: 60 CAPSULE | Refills: 11 | Status: SHIPPED | OUTPATIENT
Start: 2017-08-30 | End: 2017-09-01

## 2017-08-30 RX ORDER — FLUCONAZOLE 200 MG/1
400 TABLET ORAL DAILY
Qty: 60 TABLET | Refills: 6 | Status: SHIPPED | OUTPATIENT
Start: 2017-08-30 | End: 2017-11-09 | Stop reason: ALTCHOICE

## 2017-08-30 RX ORDER — CALCIUM CARBONATE/VITAMIN D3 600MG-5MCG
1 TABLET ORAL 2 TIMES DAILY
Qty: 60 TABLET | Refills: 11 | Status: SHIPPED | OUTPATIENT
Start: 2017-08-30 | End: 2017-12-11 | Stop reason: SDUPTHER

## 2017-08-30 RX ORDER — PREDNISONE 10 MG/1
20 TABLET ORAL DAILY
Qty: 60 TABLET | Refills: 11 | Status: SHIPPED | OUTPATIENT
Start: 2017-08-30 | End: 2017-09-01

## 2017-08-30 RX ORDER — LANOLIN ALCOHOL/MO/W.PET/CERES
400 CREAM (GRAM) TOPICAL 2 TIMES DAILY
Status: DISCONTINUED | OUTPATIENT
Start: 2017-08-30 | End: 2017-09-01 | Stop reason: HOSPADM

## 2017-08-30 RX ORDER — FOLIC ACID 1 MG/1
1 TABLET ORAL DAILY
Qty: 30 TABLET | Refills: 11 | Status: SHIPPED | OUTPATIENT
Start: 2017-08-30 | End: 2017-12-11 | Stop reason: SDUPTHER

## 2017-08-30 RX ORDER — GUAIFENESIN 600 MG/1
600 TABLET, EXTENDED RELEASE ORAL 2 TIMES DAILY
Qty: 60 TABLET | Refills: 11 | Status: ON HOLD | OUTPATIENT
Start: 2017-08-30 | End: 2018-03-26 | Stop reason: HOSPADM

## 2017-08-30 RX ORDER — FAMOTIDINE 20 MG/1
20 TABLET, FILM COATED ORAL 2 TIMES DAILY
Qty: 60 TABLET | Refills: 11 | Status: SHIPPED | OUTPATIENT
Start: 2017-08-30 | End: 2017-12-11 | Stop reason: SDUPTHER

## 2017-08-30 RX ORDER — FUROSEMIDE 10 MG/ML
60 INJECTION INTRAMUSCULAR; INTRAVENOUS ONCE
Status: COMPLETED | OUTPATIENT
Start: 2017-08-30 | End: 2017-08-30

## 2017-08-30 RX ADMIN — LEVALBUTEROL 1.25 MG: 1.25 SOLUTION, CONCENTRATE RESPIRATORY (INHALATION) at 04:08

## 2017-08-30 RX ADMIN — FAMOTIDINE 20 MG: 20 TABLET, FILM COATED ORAL at 08:08

## 2017-08-30 RX ADMIN — FOLIC ACID 1 MG: 1 TABLET ORAL at 09:08

## 2017-08-30 RX ADMIN — GUAIFENESIN 600 MG: 600 TABLET, EXTENDED RELEASE ORAL at 09:08

## 2017-08-30 RX ADMIN — FAMOTIDINE 20 MG: 20 TABLET, FILM COATED ORAL at 09:08

## 2017-08-30 RX ADMIN — SULFAMETHOXAZOLE AND TRIMETHOPRIM 1 TABLET: 800; 160 TABLET ORAL at 09:08

## 2017-08-30 RX ADMIN — VALGANCICLOVIR 450 MG: 450 TABLET, FILM COATED ORAL at 08:08

## 2017-08-30 RX ADMIN — LEVALBUTEROL 1.25 MG: 1.25 SOLUTION, CONCENTRATE RESPIRATORY (INHALATION) at 09:08

## 2017-08-30 RX ADMIN — MAGNESIUM OXIDE TAB 400 MG (241.3 MG ELEMENTAL MG) 400 MG: 400 (241.3 MG) TAB at 09:08

## 2017-08-30 RX ADMIN — ENOXAPARIN SODIUM 40 MG: 100 INJECTION SUBCUTANEOUS at 05:08

## 2017-08-30 RX ADMIN — PREDNISONE 40 MG: 20 TABLET ORAL at 09:08

## 2017-08-30 RX ADMIN — GUAIFENESIN 600 MG: 600 TABLET, EXTENDED RELEASE ORAL at 08:08

## 2017-08-30 RX ADMIN — INSULIN ASPART 1 UNITS: 100 INJECTION, SOLUTION INTRAVENOUS; SUBCUTANEOUS at 12:08

## 2017-08-30 RX ADMIN — CHLORHEXIDINE GLUCONATE 15 ML: 1.2 RINSE ORAL at 09:08

## 2017-08-30 RX ADMIN — ASPIRIN 81 MG CHEWABLE TABLET 81 MG: 81 TABLET CHEWABLE at 09:08

## 2017-08-30 RX ADMIN — FLUCONAZOLE 400 MG: 200 TABLET ORAL at 09:08

## 2017-08-30 RX ADMIN — VALGANCICLOVIR 450 MG: 450 TABLET, FILM COATED ORAL at 09:08

## 2017-08-30 RX ADMIN — FUROSEMIDE 60 MG: 10 INJECTION, SOLUTION INTRAVENOUS at 11:08

## 2017-08-30 RX ADMIN — CHLORHEXIDINE GLUCONATE 15 ML: 1.2 RINSE ORAL at 08:08

## 2017-08-30 RX ADMIN — TACROLIMUS 1 MG: 1 CAPSULE ORAL at 09:08

## 2017-08-30 RX ADMIN — CIPROFLOXACIN 400 MG: 2 INJECTION, SOLUTION INTRAVENOUS at 04:08

## 2017-08-30 RX ADMIN — DORNASE ALFA 2.5 MG: 1 SOLUTION RESPIRATORY (INHALATION) at 09:08

## 2017-08-30 RX ADMIN — MYCOPHENOLATE MOFETIL 500 MG: 250 CAPSULE ORAL at 09:08

## 2017-08-30 RX ADMIN — INSULIN ASPART 2 UNITS: 100 INJECTION, SOLUTION INTRAVENOUS; SUBCUTANEOUS at 05:08

## 2017-08-30 RX ADMIN — CIPROFLOXACIN 400 MG: 2 INJECTION, SOLUTION INTRAVENOUS at 02:08

## 2017-08-30 RX ADMIN — VANCOMYCIN HYDROCHLORIDE 1250 MG: 5 INJECTION, POWDER, LYOPHILIZED, FOR SOLUTION INTRAVENOUS at 11:08

## 2017-08-30 RX ADMIN — MYCOPHENOLATE MOFETIL 500 MG: 250 CAPSULE ORAL at 08:08

## 2017-08-30 RX ADMIN — MAGNESIUM OXIDE TAB 400 MG (241.3 MG ELEMENTAL MG) 400 MG: 400 (241.3 MG) TAB at 08:08

## 2017-08-30 RX ADMIN — TACROLIMUS 1 MG: 1 CAPSULE ORAL at 05:08

## 2017-08-30 NOTE — PLAN OF CARE
Problem: Patient Care Overview  Goal: Plan of Care Review  Outcome: Ongoing (interventions implemented as appropriate)  Patient AAOx4 and VSS. He has remained afebrile. BG is being monitored ac/hs and 97 this morning. Telemetry monitor on and patient is being paced via internal pacemaker. He went for dialysis this morning and 4 liters was removed. He had an abdominal US this afternoon. He has a right outer ankle ulcer on his foot. Wound care was consulted upon admission but has been unable to see patient due to him going to dialysis every day. He's able to ambulate without assistance and has experienced no falls today. His wife is at the bedside and they have no questions or concerns at this time. Patient stable and will continue to monitor.

## 2017-08-30 NOTE — PT/OT/SLP PROGRESS
"Speech Language Pathology  Treatment    Martin Hendrix Jr.   MRN: [id number]   Admitting Diagnosis: Lung transplanted    Diet recommendations: Solid Diet Level: Regular  Liquid Diet Level: Thin   · No straws  · Double swallows between bites  · Pace bites/sips,  take break when feeling short of breath  · Only eat when awake/alert  · All PO intake at 90 degree angle  · Continue to monitor for signs and symptoms of aspiration and discontinue oral feeding should you notice any of the following: watery eyes, reddened facial area, wet vocal quality, increased work of breathing, change in respiratory status, increased congestion, coughing, fever, etc.    SLP Treatment Date: 08/30/17  Speech Start Time: 1355     Speech Stop Time: 1430     Speech Total (min): 35 min       TREATMENT BILLABLE MINUTES:  Speech Therapy Individual 25 and Treatment Swallowing Dysfunction 10    Has the patient been evaluated by SLP for swallowing? : Yes  Keep patient NPO?: No   General Precautions: Standard, aspiration, fall, sternal  Current Respiratory Status:  (room air)       CXR 8/29/2017: Impression abnormal study similar to prior.  Some patchy opacity developing at the left base.  Developing infiltrate not excluded.    Subjective:  SLP reviewed patient with nurse pre/post session. Nurse reports no difficulty with medications  Patient presents calm and cooperative  He explains, "I feel like I have something to cough up occasionally"  He reports intermittent chest pain when coughing, nurse notified of pain and productive coughing  Pain/Comfort  Pain Rating 1: 2/10  Location - Side 1: Bilateral  Location - Orientation 1: upper  Location 1: chest (pt repoorts discomfort intermittent/when coughing)  Pain Addressed 1: Pre-medicate for activity, Distraction  Pain Rating Post-Intervention 1: 2/10    Objective:   Patient found with: chest tube, telemetry, oxygen. Pt found awake in chair next to bed, he mutes TV as SLP enters room. Patient reports good " "appetite. He recalls safe swallow strategies with 90% accuracy, MOD I. He is seen with cup sips thin liquids, self-fed, x6 today. He demonstrates immediate, productive cough x1 with thin liquids. Patient educated on ongoing aspiration precautions and S/S aspiration. He verbalizes understanding.   Patient educated on anatomy of voice and verbalizes understanding.  Patient educated on vocal hygiene, avoidance of abusive vocal behaviors, and ongoing exercises to improve voice including deep relaxation, respiration and vocal function exercises. He verbalizes partial understanding of exercises and requires MOD A to demonstrate deep relaxation exercises.  Intro provided to diaphragmatic breathing exercises. Patient demonstrates a ratio of 1 second inhale to 1-1.5 second exhale across 6 attempts with MOD A.  He demonstrates sustained phonation between .5 - 1.5 seconds with sustained /i/ and is DEP for pitch glides for "knoll" this service day. Nurse in room to take Patient for walk and ST discontinued. Whiteboard current. No further questions.     Assessment:  Martin Hendrix Jr. is a 56 y.o. male with a medical diagnosis of Lung transplanted and presents with Dysphonia. Patient with productive cough x1 with thin liquids today. Patient demonstrates decreased sustained phonation and minimal pitch variation across attempts to initiate vocal fx exercises.  ST to continue to follow.     Discharge recommendations: Discharge Facility/Level Of Care Needs: home health speech therapy   Goals:    SLP Goals        Problem: SLP Goal    Goal Priority Disciplines Outcome   SLP Goal     SLP Ongoing (interventions implemented as appropriate)   Description:  Speech Language Pathology Goals  Goals expected to be met by 9/1:  1. Pt will tolerate regular consistencies and thin liquids without overt s/s of aspiration, MOD I  2. Pt will participate in Modified Barium Swallow Study to r/o aspiration and determine safest PO diet. MET 8/25  3. " Educate Patient on aspiration precautions and S/S aspiration   4. Educate patient on anatomy and physiology of vocal system and how vocal pathology affects voice production  5. Pt will complete vocal fold adduction and vocal function exercises with good effort 90% of time, Supervision, to improve voice production                             Plan:   Patient to be seen Therapy Frequency: 5 x/week   Plan of Care expires: 09/23/17  Plan of Care reviewed with: patient  SLP Follow-up?: Yes       SILVIANO Garcia, [facility]-SLP  Speech-Language Pathology  Pager: [phone]  8/30/2017

## 2017-08-30 NOTE — SUBJECTIVE & OBJECTIVE
Subjective:     Interval History: No acute events overnight.  Continue to progress    Continuous Infusions:   Scheduled Meds:   aspirin  81 mg Oral Daily    chlorhexidine  15 mL Mouth/Throat BID    ciprofloxacin  400 mg Intravenous Q12H    dornase alpha  2.5 mg Inhalation BID    enoxaparin  40 mg Subcutaneous Daily    famotidine  20 mg Oral BID    fluconazole  400 mg Oral Daily    folic acid  1 mg Oral Daily    guaifenesin  600 mg Oral BID    levalbuterol  1.25 mg Nebulization Q8H    magnesium oxide  400 mg Oral BID    mycophenolate  500 mg Oral BID    predniSONE  40 mg Oral Daily    sulfamethoxazole-trimethoprim 800-160mg  1 tablet Oral Every Mon, Wed, Fri    tacrolimus  1 mg Oral BID    valganciclovir  450 mg Oral BID    vancomycin (VANCOCIN) IVPB  1,250 mg Intravenous Q12H     PRN Meds:acetaminophen, dextrose 50%, dextrose 50%, glucagon (human recombinant), glucose, glucose, HYDROmorphone, insulin aspart, magnesium sulfate IVPB **AND** magnesium sulfate IVPB, ondansetron, oxycodone-acetaminophen, potassium chloride 10% **AND** potassium chloride 10% **AND** potassium chloride 10%, promethazine (PHENERGAN) IVPB, tramadol    Review of patient's allergies indicates:  No Known Allergies    Review of Systems   Constitutional: Negative for appetite change, chills, fatigue, fever and unexpected weight change.   HENT: Positive for voice change. Negative for congestion, ear pain, hearing loss, mouth sores and postnasal drip.    Eyes: Negative for photophobia, pain, discharge, redness, itching and visual disturbance.   Respiratory: Negative for cough, chest tightness and shortness of breath.    Cardiovascular: Positive for chest pain (at incision sites) and leg swelling. Negative for palpitations.   Gastrointestinal: Negative for abdominal distention, abdominal pain, blood in stool, constipation and diarrhea.   Endocrine: Negative for cold intolerance, heat intolerance, polydipsia, polyphagia and polyuria.    Genitourinary: Negative for decreased urine volume, difficulty urinating, dysuria, frequency, hematuria and urgency.   Musculoskeletal: Negative for arthralgias and joint swelling.   Allergic/Immunologic: Positive for immunocompromised state. Negative for environmental allergies and food allergies.   Neurological: Positive for weakness (due to debility). Negative for dizziness, tremors, syncope, speech difficulty and numbness.   Hematological: Negative for adenopathy. Does not bruise/bleed easily.   Psychiatric/Behavioral: Negative for agitation, confusion and hallucinations.     Objective:   Physical Exam   Constitutional: He is oriented to person, place, and time. He appears well-developed and well-nourished. No distress.   HENT:   Head: Normocephalic and atraumatic.   Nose: Nose normal.   Mouth/Throat: Oropharynx is clear and moist. No oropharyngeal exudate.   Eyes: Conjunctivae and EOM are normal. Pupils are equal, round, and reactive to light. No scleral icterus.   Neck: Normal range of motion. Neck supple. No JVD present. No tracheal deviation present. No thyromegaly present.   Cardiovascular: Normal rate, regular rhythm and normal heart sounds.    Pulmonary/Chest: Effort normal and breath sounds normal. No respiratory distress. He has no wheezes. He has no rales. He exhibits no tenderness.   Chest tubes in place with serosanguinous output and no air leaks.   Abdominal: Soft. Bowel sounds are normal. He exhibits no distension. There is no tenderness.   Musculoskeletal: Normal range of motion. He exhibits edema (trace BLE). He exhibits no tenderness.   Neurological: He is alert and oriented to person, place, and time. No cranial nerve deficit.   Skin: Skin is warm and dry. He is not diaphoretic.   Psychiatric: He has a normal mood and affect. Thought content normal.         Vital Signs (Most Recent):  Temp: 98.5 °F (36.9 °C) (08/30/17 0745)  Pulse: 84 (08/30/17 0956)  Resp: 18 (08/30/17 0956)  BP: 114/68  (08/30/17 0745)  SpO2: 98 % (08/30/17 0956) Vital Signs (24h Range):  Temp:  [98.2 °F (36.8 °C)-98.5 °F (36.9 °C)] 98.5 °F (36.9 °C)  Pulse:  [66-86] 84  Resp:  [17-20] 18  SpO2:  [92 %-100 %] 98 %  BP: (113-134)/(59-79) 114/68     Weight: 105.2 kg (231 lb 14.8 oz)  Body mass index is 33.28 kg/m².      Intake/Output Summary (Last 24 hours) at 08/30/17 1126  Last data filed at 08/30/17 1110   Gross per 24 hour   Intake             2020 ml   Output             3050 ml   Net            -1030 ml       Significant Labs:  CBC:    Recent Labs  Lab 08/30/17 0536   WBC 15.00*   RBC 3.44*   HGB 10.5*   HCT 31.1*      MCV 90   MCH 30.5   MCHC 33.8     BMP:    Recent Labs  Lab 08/30/17 0536   *   K 5.3*   CL 94*   CO2 30*   BUN 37*   CREATININE 1.0   CALCIUM 8.3*      Tacrolimus Levels:    Recent Labs  Lab 08/30/17 0536   TACROLIMUS 7.0     Microbiology:  Microbiology Results (last 7 days)     Procedure Component Value Units Date/Time    Fungus culture [[id number]] Collected:  08/23/17 0957    Order Status:  Completed Specimen:  Respiratory from Bronchial Wash Updated:  08/30/17 1001     Fungus (Mycology) Culture --     YEAST   Few  Identification pending      Narrative:       Bronchial Wash    Culture, Anaerobe [[id number]] Collected:  08/22/17 0814    Order Status:  Completed Specimen:  Tissue from Lung, Lingula Updated:  08/28/17 0922     Anaerobic Culture No anaerobes isolated    Narrative:       Bronchus of donor lung    Culture, Respiratory [[id number]] Collected:  08/23/17 0957    Order Status:  Completed Specimen:  Respiratory from Bronchial Wash Updated:  08/25/17 1103     Respiratory Culture --     STAPHYLOCOCCUS AUREUS  Moderate  Normal respiratory mary also present       Gram Stain (Respiratory) Few WBC's     Gram Stain (Respiratory) No organisms seen    Narrative:       Bronchial Wash    AFB Culture & Smear [[id number]] Collected:  08/23/17 0957    Order Status:  Completed Specimen:  Respiratory from  Bronchial Wash Updated:  08/24/17 2127     AFB Culture & Smear Culture in progress     AFB CULTURE STAIN No acid fast bacilli seen.    Narrative:       Bronchial Wash    Aerobic culture [[id number]]  (Susceptibility) Collected:  08/22/17 0816    Order Status:  Completed Specimen:  Tissue from Lung, Lingula Updated:  08/24/17 1304     Aerobic Bacterial Culture --     STAPHYLOCOCCUS AUREUS  Few      Narrative:       Bronchus of donor lung    Fungus culture [[id number]] Collected:  08/22/17 0815    Order Status:  Completed Specimen:  Tissue from Lung, Lingula Updated:  08/23/17 1358     Fungus (Mycology) Culture Culture in progress    Narrative:       Bronchus of donor lung    AFB Culture & Smear [[id number]] Collected:  08/22/17 0814    Order Status:  Completed Specimen:  Tissue from Lung, Lingula Updated:  08/23/17 1354     AFB Culture & Smear Culture in progress     AFB CULTURE STAIN No acid fast bacilli seen.    Narrative:       Bronchus of donor lung          I have reviewed all pertinent labs within the past 24 hours.    Diagnostic Results:

## 2017-08-30 NOTE — ASSESSMENT & PLAN NOTE
Bactrim, valganciclovir, and diflucan for opportunistic infection prophylaxis. Continue ciprofloxacin and Vancomycin.

## 2017-08-30 NOTE — ASSESSMENT & PLAN NOTE
There is no evidence of graft dysfunction. Currently on room air.  Continue CPT, incentive spirometer, and bronchodilators. Continue to monitor pleural chest tube output.

## 2017-08-30 NOTE — PLAN OF CARE
Problem: SLP Goal  Goal: SLP Goal  Speech Language Pathology Goals  Goals expected to be met by 9/1:  1. Pt will tolerate regular consistencies and thin liquids without overt s/s of aspiration, MOD I  2. Pt will participate in Modified Barium Swallow Study to r/o aspiration and determine safest PO diet. MET 8/25  3. Educate Patient on aspiration precautions and S/S aspiration   4. Educate patient on anatomy and physiology of vocal system and how vocal pathology affects voice production  5. Pt will complete vocal fold adduction and vocal function exercises with good effort 90% of time, Supervision, to improve voice production             Outcome: Ongoing (interventions implemented as appropriate)  Pt progressing towards goals. Pt with productive cough x1 during session, nurse notified. Ongoing review of vocal hygiene and safe swallow strategies provided See note for full details. Thank you.    SILVIO Garcia., [facility]-SLP  Speech-Language Pathology  Pager: [phone]  8/30/2017

## 2017-08-30 NOTE — PLAN OF CARE
Problem: Patient Care Overview  Goal: Plan of Care Review  Outcome: Ongoing (interventions implemented as appropriate)  Patient AAOx4 and VSS. He has remained afebrile. BG is being monitored ac/hs and was 125 before breakfast. He is on contact precaution for MRSA in his sputum. Telemetry monitor on and HR has been 70-80, NS. He has a midsternal incision that's FISH w/dermabond. Sternal precautions maintained. He has two pleural CT to water seal that were  this morning. Self meds are set up in the room and his daughter pulls them 100%. Patient is able to ambulate with standby assist and has experienced no falls today. He has no questions or concerns at this time. Patient stable and will continue to monitor.

## 2017-08-30 NOTE — PROGRESS NOTES
[facility]  Lung Transplant  Progress Note - Floor    Patient Name: Martin Hendrix Jr.  MRN: [id number]  Admission Date: 8/21/2017  Hospital Length of Stay: 9 days  Post-Operative Day: 8  Attending Physician: Darrick Wolfe MD  Primary Care Provider: Sukhi Dunham Jr, MD     Subjective:     Interval History: No acute events overnight.  Continue to progress    Continuous Infusions:   Scheduled Meds:   aspirin  81 mg Oral Daily    chlorhexidine  15 mL Mouth/Throat BID    ciprofloxacin  400 mg Intravenous Q12H    dornase alpha  2.5 mg Inhalation BID    enoxaparin  40 mg Subcutaneous Daily    famotidine  20 mg Oral BID    fluconazole  400 mg Oral Daily    folic acid  1 mg Oral Daily    guaifenesin  600 mg Oral BID    levalbuterol  1.25 mg Nebulization Q8H    magnesium oxide  400 mg Oral BID    mycophenolate  500 mg Oral BID    predniSONE  40 mg Oral Daily    sulfamethoxazole-trimethoprim 800-160mg  1 tablet Oral Every Mon, Wed, Fri    tacrolimus  1 mg Oral BID    valganciclovir  450 mg Oral BID    vancomycin (VANCOCIN) IVPB  1,250 mg Intravenous Q12H     PRN Meds:acetaminophen, dextrose 50%, dextrose 50%, glucagon (human recombinant), glucose, glucose, HYDROmorphone, insulin aspart, magnesium sulfate IVPB **AND** magnesium sulfate IVPB, ondansetron, oxycodone-acetaminophen, potassium chloride 10% **AND** potassium chloride 10% **AND** potassium chloride 10%, promethazine (PHENERGAN) IVPB, tramadol    Review of patient's allergies indicates:  No Known Allergies    Review of Systems   Constitutional: Negative for appetite change, chills, fatigue, fever and unexpected weight change.   HENT: Positive for voice change. Negative for congestion, ear pain, hearing loss, mouth sores and postnasal drip.    Eyes: Negative for photophobia, pain, discharge, redness, itching and visual disturbance.   Respiratory: Negative for cough, chest tightness and shortness of breath.    Cardiovascular:  Positive for chest pain (at incision sites) and leg swelling. Negative for palpitations.   Gastrointestinal: Negative for abdominal distention, abdominal pain, blood in stool, constipation and diarrhea.   Endocrine: Negative for cold intolerance, heat intolerance, polydipsia, polyphagia and polyuria.   Genitourinary: Negative for decreased urine volume, difficulty urinating, dysuria, frequency, hematuria and urgency.   Musculoskeletal: Negative for arthralgias and joint swelling.   Allergic/Immunologic: Positive for immunocompromised state. Negative for environmental allergies and food allergies.   Neurological: Positive for weakness (due to debility). Negative for dizziness, tremors, syncope, speech difficulty and numbness.   Hematological: Negative for adenopathy. Does not bruise/bleed easily.   Psychiatric/Behavioral: Negative for agitation, confusion and hallucinations.     Objective:   Physical Exam   Constitutional: He is oriented to person, place, and time. He appears well-developed and well-nourished. No distress.   HENT:   Head: Normocephalic and atraumatic.   Nose: Nose normal.   Mouth/Throat: Oropharynx is clear and moist. No oropharyngeal exudate.   Eyes: Conjunctivae and EOM are normal. Pupils are equal, round, and reactive to light. No scleral icterus.   Neck: Normal range of motion. Neck supple. No JVD present. No tracheal deviation present. No thyromegaly present.   Cardiovascular: Normal rate, regular rhythm and normal heart sounds.    Pulmonary/Chest: Effort normal and breath sounds normal. No respiratory distress. He has no wheezes. He has no rales. He exhibits no tenderness.   Chest tubes in place with serosanguinous output and no air leaks.   Abdominal: Soft. Bowel sounds are normal. He exhibits no distension. There is no tenderness.   Musculoskeletal: Normal range of motion. He exhibits edema (trace BLE). He exhibits no tenderness.   Neurological: He is alert and oriented to person, place, and  time. No cranial nerve deficit.   Skin: Skin is warm and dry. He is not diaphoretic.   Psychiatric: He has a normal mood and affect. Thought content normal.         Vital Signs (Most Recent):  Temp: 98.5 °F (36.9 °C) (08/30/17 0745)  Pulse: 84 (08/30/17 0956)  Resp: 18 (08/30/17 0956)  BP: 114/68 (08/30/17 0745)  SpO2: 98 % (08/30/17 0956) Vital Signs (24h Range):  Temp:  [98.2 °F (36.8 °C)-98.5 °F (36.9 °C)] 98.5 °F (36.9 °C)  Pulse:  [66-86] 84  Resp:  [17-20] 18  SpO2:  [92 %-100 %] 98 %  BP: (113-134)/(59-79) 114/68     Weight: 105.2 kg (231 lb 14.8 oz)  Body mass index is 33.28 kg/m².      Intake/Output Summary (Last 24 hours) at 08/30/17 1126  Last data filed at 08/30/17 1110   Gross per 24 hour   Intake             2020 ml   Output             3050 ml   Net            -1030 ml       Significant Labs:  CBC:    Recent Labs  Lab 08/30/17  0536   WBC 15.00*   RBC 3.44*   HGB 10.5*   HCT 31.1*      MCV 90   MCH 30.5   MCHC 33.8     BMP:    Recent Labs  Lab 08/30/17  0536   *   K 5.3*   CL 94*   CO2 30*   BUN 37*   CREATININE 1.0   CALCIUM 8.3*      Tacrolimus Levels:    Recent Labs  Lab 08/30/17  0536   TACROLIMUS 7.0     Microbiology:  Microbiology Results (last 7 days)     Procedure Component Value Units Date/Time    Fungus culture [[id number]] Collected:  08/23/17 0957    Order Status:  Completed Specimen:  Respiratory from Bronchial Wash Updated:  08/30/17 1001     Fungus (Mycology) Culture --     YEAST   Few  Identification pending      Narrative:       Bronchial Wash    Culture, Anaerobe [[id number]] Collected:  08/22/17 0814    Order Status:  Completed Specimen:  Tissue from Lung, Lingula Updated:  08/28/17 0922     Anaerobic Culture No anaerobes isolated    Narrative:       Bronchus of donor lung    Culture, Respiratory [[id number]] Collected:  08/23/17 0957    Order Status:  Completed Specimen:  Respiratory from Bronchial Wash Updated:  08/25/17 1103     Respiratory Culture --     STAPHYLOCOCCUS  AUREUS  Moderate  Normal respiratory mary also present       Gram Stain (Respiratory) Few WBC's     Gram Stain (Respiratory) No organisms seen    Narrative:       Bronchial Wash    AFB Culture & Smear [[id number]] Collected:  08/23/17 0957    Order Status:  Completed Specimen:  Respiratory from Bronchial Wash Updated:  08/24/17 2127     AFB Culture & Smear Culture in progress     AFB CULTURE STAIN No acid fast bacilli seen.    Narrative:       Bronchial Wash    Aerobic culture [[id number]]  (Susceptibility) Collected:  08/22/17 0816    Order Status:  Completed Specimen:  Tissue from Lung, Lingula Updated:  08/24/17 1304     Aerobic Bacterial Culture --     STAPHYLOCOCCUS AUREUS  Few      Narrative:       Bronchus of donor lung    Fungus culture [[id number]] Collected:  08/22/17 0815    Order Status:  Completed Specimen:  Tissue from Lung, Lingula Updated:  08/23/17 1358     Fungus (Mycology) Culture Culture in progress    Narrative:       Bronchus of donor lung    AFB Culture & Smear [[id number]] Collected:  08/22/17 0814    Order Status:  Completed Specimen:  Tissue from Lung, Lingula Updated:  08/23/17 1354     AFB Culture & Smear Culture in progress     AFB CULTURE STAIN No acid fast bacilli seen.    Narrative:       Bronchus of donor lung          I have reviewed all pertinent labs within the past 24 hours.    Diagnostic Results:        Assessment/Plan:     * Lung transplanted    There is no evidence of graft dysfunction. Currently on room air.  Continue CPT, incentive spirometer, and bronchodilators. Continue to monitor pleural chest tube output.        Immunosuppression    Solu-Medrol administered in the OR. Basiliximab in the ICU. Continue tacrolimus, mycophenolate, and corticoteroid taper         Prophylactic antibiotic    Bactrim, valganciclovir, and diflucan for opportunistic infection prophylaxis. Continue ciprofloxacin and Vancomycin.          Leukocytosis    Expected after transplant. Will continue to  monitor.         Hyperglycemia    Appreciate input from endocrinology        Paroxysmal atrial fibrillation    Currently in NSR.          Dysphonia    Secondary to left vocal cord dysfunction. SLP cleared him for a regular diet. ENT following        AVILA (acute kidney injury)    Likely form ATN after surgery. His creatinine is improving and he is making good urine.        Hyponatremia    Likely from AVILA, will continue to monitor.        Hyperkalemia    Likely from AVILA, no interventions unless >6            Gayle EDUARDO Celestin NP  Lung Transplant  [facility]

## 2017-08-31 DIAGNOSIS — E83.42 HYPOMAGNESEMIA: ICD-10-CM

## 2017-08-31 DIAGNOSIS — Z94.2 LUNG REPLACED BY TRANSPLANT: Primary | ICD-10-CM

## 2017-08-31 LAB
ALBUMIN SERPL BCP-MCNC: 2.3 G/DL
ALP SERPL-CCNC: 56 U/L
ALT SERPL W/O P-5'-P-CCNC: 17 U/L
ANION GAP SERPL CALC-SCNC: 4 MMOL/L
ANISOCYTOSIS BLD QL SMEAR: SLIGHT
AST SERPL-CCNC: 25 U/L
B CELL RESULTS - XM AUTO: NEGATIVE
B MCS AVERAGE - XM AUTO: 30.5
BASOPHILS # BLD AUTO: ABNORMAL K/UL
BASOPHILS NFR BLD: 0 %
BILIRUB SERPL-MCNC: 0.5 MG/DL
BUN SERPL-MCNC: 38 MG/DL
CALCIUM SERPL-MCNC: 8.2 MG/DL
CHLORIDE SERPL-SCNC: 94 MMOL/L
CO2 SERPL-SCNC: 31 MMOL/L
CREAT SERPL-MCNC: 1.3 MG/DL
DIFFERENTIAL METHOD: ABNORMAL
EOSINOPHIL # BLD AUTO: ABNORMAL K/UL
EOSINOPHIL NFR BLD: 0 %
ERYTHROCYTE [DISTWIDTH] IN BLOOD BY AUTOMATED COUNT: 13.7 %
EST. GFR  (AFRICAN AMERICAN): >60 ML/MIN/1.73 M^2
EST. GFR  (NON AFRICAN AMERICAN): >60 ML/MIN/1.73 M^2
FXMAU TESTING DATE: NORMAL
GLUCOSE SERPL-MCNC: 132 MG/DL
HCT VFR BLD AUTO: 29.8 %
HGB BLD-MCNC: 10.1 G/DL
HLA FLOW CROSSMATCH (AUTO) INTERPRETATION: NORMAL
HPRA INTERPRETATION: NORMAL
LYMPHOCYTES # BLD AUTO: ABNORMAL K/UL
LYMPHOCYTES NFR BLD: 12 %
MAGNESIUM SERPL-MCNC: 2 MG/DL
MCH RBC QN AUTO: 30.9 PG
MCHC RBC AUTO-ENTMCNC: 33.9 G/DL
MCV RBC AUTO: 91 FL
MONOCYTES # BLD AUTO: ABNORMAL K/UL
MONOCYTES NFR BLD: 4 %
NEUTROPHILS NFR BLD: 83 %
NEUTS BAND NFR BLD MANUAL: 1 %
OVALOCYTES BLD QL SMEAR: ABNORMAL
PLATELET # BLD AUTO: 291 K/UL
PMV BLD AUTO: 9.8 FL
POCT GLUCOSE: 111 MG/DL (ref 70–110)
POCT GLUCOSE: 152 MG/DL (ref 70–110)
POCT GLUCOSE: 210 MG/DL (ref 70–110)
POCT GLUCOSE: 230 MG/DL (ref 70–110)
POIKILOCYTOSIS BLD QL SMEAR: SLIGHT
POLYCHROMASIA BLD QL SMEAR: ABNORMAL
POTASSIUM SERPL-SCNC: 5.5 MMOL/L
PROT SERPL-MCNC: 5.3 G/DL
RBC # BLD AUTO: 3.27 M/UL
SERUM COLLECTION DT - XM AUTO: NORMAL
SODIUM SERPL-SCNC: 129 MMOL/L
T CELL RESULTS - XM AUTO: POSITIVE
T MCS AVERAGE - XM AUTO: 62.5
TACROLIMUS BLD-MCNC: 8.1 NG/ML
WBC # BLD AUTO: 16.67 K/UL

## 2017-08-31 PROCEDURE — 63600175 PHARM REV CODE 636 W HCPCS

## 2017-08-31 PROCEDURE — 63600175 PHARM REV CODE 636 W HCPCS: Performed by: INTERNAL MEDICINE

## 2017-08-31 PROCEDURE — 36415 COLL VENOUS BLD VENIPUNCTURE: CPT

## 2017-08-31 PROCEDURE — 63600175 PHARM REV CODE 636 W HCPCS: Performed by: NURSE PRACTITIONER

## 2017-08-31 PROCEDURE — 92507 TX SP LANG VOICE COMM INDIV: CPT

## 2017-08-31 PROCEDURE — 36600 WITHDRAWAL OF ARTERIAL BLOOD: CPT

## 2017-08-31 PROCEDURE — 25000242 PHARM REV CODE 250 ALT 637 W/ HCPCS: Performed by: NURSE PRACTITIONER

## 2017-08-31 PROCEDURE — 80053 COMPREHEN METABOLIC PANEL: CPT

## 2017-08-31 PROCEDURE — 97110 THERAPEUTIC EXERCISES: CPT

## 2017-08-31 PROCEDURE — 85027 COMPLETE CBC AUTOMATED: CPT

## 2017-08-31 PROCEDURE — 82803 BLOOD GASES ANY COMBINATION: CPT

## 2017-08-31 PROCEDURE — 85007 BL SMEAR W/DIFF WBC COUNT: CPT

## 2017-08-31 PROCEDURE — 25000003 PHARM REV CODE 250: Performed by: NURSE PRACTITIONER

## 2017-08-31 PROCEDURE — 80197 ASSAY OF TACROLIMUS: CPT

## 2017-08-31 PROCEDURE — 99232 SBSQ HOSP IP/OBS MODERATE 35: CPT | Mod: ,,, | Performed by: INTERNAL MEDICINE

## 2017-08-31 PROCEDURE — 97530 THERAPEUTIC ACTIVITIES: CPT

## 2017-08-31 PROCEDURE — 83735 ASSAY OF MAGNESIUM: CPT

## 2017-08-31 PROCEDURE — 20600001 HC STEP DOWN PRIVATE ROOM

## 2017-08-31 PROCEDURE — 25000242 PHARM REV CODE 250 ALT 637 W/ HCPCS: Performed by: INTERNAL MEDICINE

## 2017-08-31 PROCEDURE — 25000003 PHARM REV CODE 250: Performed by: INTERNAL MEDICINE

## 2017-08-31 PROCEDURE — 97535 SELF CARE MNGMENT TRAINING: CPT

## 2017-08-31 PROCEDURE — 97116 GAIT TRAINING THERAPY: CPT

## 2017-08-31 PROCEDURE — 94761 N-INVAS EAR/PLS OXIMETRY MLT: CPT

## 2017-08-31 PROCEDURE — 94668 MNPJ CHEST WALL SBSQ: CPT

## 2017-08-31 PROCEDURE — 94640 AIRWAY INHALATION TREATMENT: CPT

## 2017-08-31 RX ADMIN — VANCOMYCIN HYDROCHLORIDE 1250 MG: 5 INJECTION, POWDER, LYOPHILIZED, FOR SOLUTION INTRAVENOUS at 11:08

## 2017-08-31 RX ADMIN — VALGANCICLOVIR 450 MG: 450 TABLET, FILM COATED ORAL at 08:08

## 2017-08-31 RX ADMIN — INSULIN ASPART 1 UNITS: 100 INJECTION, SOLUTION INTRAVENOUS; SUBCUTANEOUS at 01:08

## 2017-08-31 RX ADMIN — MAGNESIUM OXIDE TAB 400 MG (241.3 MG ELEMENTAL MG) 400 MG: 400 (241.3 MG) TAB at 08:08

## 2017-08-31 RX ADMIN — CIPROFLOXACIN 400 MG: 2 INJECTION, SOLUTION INTRAVENOUS at 02:08

## 2017-08-31 RX ADMIN — FAMOTIDINE 20 MG: 20 TABLET, FILM COATED ORAL at 08:08

## 2017-08-31 RX ADMIN — TACROLIMUS 1 MG: 1 CAPSULE ORAL at 06:08

## 2017-08-31 RX ADMIN — MYCOPHENOLATE MOFETIL 500 MG: 250 CAPSULE ORAL at 08:08

## 2017-08-31 RX ADMIN — OXYCODONE HYDROCHLORIDE AND ACETAMINOPHEN 1 TABLET: 7.5; 325 TABLET ORAL at 12:08

## 2017-08-31 RX ADMIN — INSULIN ASPART 1 UNITS: 100 INJECTION, SOLUTION INTRAVENOUS; SUBCUTANEOUS at 08:08

## 2017-08-31 RX ADMIN — VANCOMYCIN HYDROCHLORIDE 1250 MG: 5 INJECTION, POWDER, LYOPHILIZED, FOR SOLUTION INTRAVENOUS at 12:08

## 2017-08-31 RX ADMIN — GUAIFENESIN 600 MG: 600 TABLET, EXTENDED RELEASE ORAL at 08:08

## 2017-08-31 RX ADMIN — OXYCODONE HYDROCHLORIDE AND ACETAMINOPHEN 1 TABLET: 7.5; 325 TABLET ORAL at 06:08

## 2017-08-31 RX ADMIN — PREDNISONE 40 MG: 20 TABLET ORAL at 08:08

## 2017-08-31 RX ADMIN — ENOXAPARIN SODIUM 40 MG: 100 INJECTION SUBCUTANEOUS at 06:08

## 2017-08-31 RX ADMIN — TACROLIMUS 1 MG: 1 CAPSULE ORAL at 08:08

## 2017-08-31 RX ADMIN — CIPROFLOXACIN 400 MG: 2 INJECTION, SOLUTION INTRAVENOUS at 03:08

## 2017-08-31 RX ADMIN — LEVALBUTEROL 1.25 MG: 1.25 SOLUTION, CONCENTRATE RESPIRATORY (INHALATION) at 09:08

## 2017-08-31 RX ADMIN — DORNASE ALFA 2.5 MG: 1 SOLUTION RESPIRATORY (INHALATION) at 09:08

## 2017-08-31 RX ADMIN — CHLORHEXIDINE GLUCONATE 15 ML: 1.2 RINSE ORAL at 08:08

## 2017-08-31 RX ADMIN — FOLIC ACID 1 MG: 1 TABLET ORAL at 08:08

## 2017-08-31 RX ADMIN — LEVALBUTEROL 1.25 MG: 1.25 SOLUTION, CONCENTRATE RESPIRATORY (INHALATION) at 12:08

## 2017-08-31 RX ADMIN — INSULIN ASPART 1 UNITS: 100 INJECTION, SOLUTION INTRAVENOUS; SUBCUTANEOUS at 06:08

## 2017-08-31 RX ADMIN — LEVALBUTEROL 1.25 MG: 1.25 SOLUTION, CONCENTRATE RESPIRATORY (INHALATION) at 04:08

## 2017-08-31 RX ADMIN — FLUCONAZOLE 400 MG: 200 TABLET ORAL at 08:08

## 2017-08-31 RX ADMIN — ASPIRIN 81 MG CHEWABLE TABLET 81 MG: 81 TABLET CHEWABLE at 08:08

## 2017-08-31 NOTE — PROGRESS NOTES
Met with the patient, his daughter and his mother to conduct post-lung transplant education.  All stated they had reviewed the transplant information provided previously in preparation for this teaching session.  Topics covered included:    contact phone numbers for the Lung Transplant team both during and after office hours and for urgent/emergent needs; use of [facility] message function for communication re: non-urgent issues and questions   review of team members and their roles    schedule for clinic visits, lab work, pulmonary function studies, and CXRs, as well as timeline for surveillance bronchoscopies.  Included information about locations and special considerations such as holding Prograf until after blood has been drawn for labs, etc.    obtaining and documenting vital signs daily: Reviewed the findings which are reportable even between clinic visits: Temp > 100.5, or low to no temp elevation if feeling poorly; weight gain of > 2 lbs daily for 2 or more consecutive days, or weight gain of >3 lbs in 24 hours;  BP >140/90 for several consecutive readings   bringing medicine list and vital sign log to each clinic visit   complications post-lung transplant and possible treatment options, which may include re-hospitalization and outpatient IV medications.  Discussed infection and rejection with a review of the corresponding signs/symptoms.  Also discussed other potential complications such as airway complications, pleural complications, high blood pressure, diabetes, kidney disease, Chronic Lung Allograft Dysfunction (CLAD) and medication side effects    overview of how the immunosuppressant medications prevent rejection    importance of taking meds as prescribed, never self adjusting doses, following only the directions on the medication list and never using the medicine bottle to verify current medication directions; use medication label only to identify the drug and strength per pill/capsule (Lung  Transplant Clinical Pharmacist will review each medication in detail and provide the first months supply at discharge); patient is expected to learn the names, doses, action, side effects and dose scheduled, as well as be independent with taking medications.    what to do if a dose of medication is missed or if the patient vomits after taking medication    refills are the patients responsibility and should be requested when the patient has 7-10 days worth of the medication left. Never let a medication run out.   list of allowable OTC meds; never take NSAIDS or consume any food/beverage containing grapefruit or pomegranate; check with Lung Transplant Team before taking any medication prescribed by another physician or licensed provider   limit ETOH intake to one drink on special occasions (e.g., holidays, birthday)    infection prevention and the importance of good and frequent hand washing for patient and members of the household; avoiding sick contacts and roro/smoking environments; staying up-to-date with tetanus, flu and pneumonia vaccinations   health maintenance appointments such as biannual dental exams and the need for prophylactic antibiotic; annual eye exams with an Opthamologist; annual or biannual Dermatology visits plus visits whenever needed to evaluate new findings. It is important to do regular self examination of skin which includes examination by an appropriate family member of areas not easily visualized; PCP to manage other health maintenance care - e.g., colonoscopy, prostate exams, vaccinations   recommendations for outdoor activities: wear sun block whenever leaving the home since skin cancer is one of the most prevalent types of cancer in the immunosuppressed population; use insect repellant to decrease risk of disease transmission from mosquitoes; stay hydrated with water and low-sugar electrolyte drinks   develop a plan for regular sleep, routine exercise and a well-balanced diet  to improve physical and mental health    observe strict sternal precautions for a minimum of 6-8 weeks post transplant: no lifting objects heavier than 5 lbs; do not push up using arms when changing position; do not engage in any activity which could result in trauma to the sternum. Sternal wound care and observation of incision for signs/symptoms of infection and/or separation of wound edges with or without drainage    send a thank you note to the donor family through the appropriate organ procurement agency - must not include information that will make it possible to be identified; explained the process for both parties to use for future communications.    stressed the importance of quickly regaining physical strength and endurance which is mandatory for the prevention of complications such as blood clots, pneumonia and progressive loss of muscle mass which can lead to further debilitation.  Reviewed the options for home health OT/PT vs outpatient pulmonary rehab once discharged from the hospital.   The patient asked for time to consider which option he would like to  pursue.      The patient and his family member actively participated in the education session, asking pertinent questions and repeating information back to me correctly.  Reminded them that lung transplant team members remain available to review information and/or answer additional questions.  All verbalized their understanding of the information discussed and demonstrated readiness for discharge, which is tentatively scheduled for tomorrow.

## 2017-08-31 NOTE — PROGRESS NOTES
Patient scheduled for hospital discharge tomorrow.  Orders for outpatient, follow up transplant care entered based on orders from Dr. Wolfe and department guidelines.

## 2017-08-31 NOTE — PLAN OF CARE
Problem: Physical Therapy Goal  Goal: Physical Therapy Goal  Goals to be met by: 17     Patient will increase functional independence with mobility by performin. Supine to sit with Stand-by Assistance  2. Sit to supine with Stand-by Assistance  3. Sit to stand transfer with Stand-by Assistance - Met   Updated: Sit to stand transfer with Mod I.  4. Bed to chair transfer with Stand-by Assistance - Met   Updated: Bed to chair transfer with Mod I.  5. Gait  x 150 feet with Stand-by Assistance with rest breaks as needed.    6. Lower extremity exercise program x 15 reps per handout, with independence  7. Patient to recall 3/3 sternal precautions without verbal cues.      Outcome: Ongoing (interventions implemented as appropriate)  Pt progressing towards goals.

## 2017-08-31 NOTE — PLAN OF CARE
Problem: SLP Goal  Goal: SLP Goal  Speech Language Pathology Goals  Goals expected to be met by 9/1:  1. Pt will tolerate regular consistencies and thin liquids without overt s/s of aspiration, MOD I  2. Pt will participate in Modified Barium Swallow Study to r/o aspiration and determine safest PO diet. MET 8/25  3. Educate Patient on aspiration precautions and S/S aspiration   4. Educate patient on anatomy and physiology of vocal system and how vocal pathology affects voice production  5. Pt will complete vocal fold adduction and vocal function exercises with good effort 90% of time, Supervision, to improve voice production             Outcome: Ongoing (interventions implemented as appropriate)  Pt progressing towards goals. See note for full details. Patient will require ongoing ST following d/c from acute and ongoing f/u with ENT 2/2 Dysphonia. Thank you.    SILVIO Garcia., [facility]-SLP  Speech-Language Pathology  Pager: [phone]  8/31/2017

## 2017-08-31 NOTE — PT/OT/SLP PROGRESS
Occupational Therapy  Treatment    Martin Hendrix Jr.   MRN: [id number]   Admitting Diagnosis: Lung transplanted    OT Date of Treatment: 08/31/17   OT Start Time: 1238  OT Stop Time: 1301  OT Total Time (min): 23 min    Billable Minutes:  Therapeutic Activity 13 min and Therapeutic Exercise 10 min    General Precautions: Standard, aspiration, fall, sternal  Orthopedic Precautions:  (sternal)  Braces: N/A         Subjective:  Communicated with RN prior to session.  Pt and family agreeable to OT session.  Pain/Comfort  Pain Rating 1: 4/10  Location - Side 1: Bilateral  Location - Orientation 1: generalized  Location 1: chest  Pain Addressed 1: Reposition, Distraction  Pain Rating Post-Intervention 1: 4/10    Objective:  Patient found with: chest tube, peripheral IV     Functional Mobility:  Bed Mobility:   Not performed, pt found UIC.    Transfers:   Sit <> Stand Assistance: Stand By Assistance (from bedside chair)  Sit <> Stand Assistive Device: No Assistive Device    Functional Ambulation: Pt performed mobility within room and down hallway, approximately household distance, with SBA and no AD. One minor episode of unsteadiness, however pt able to (I)'ly self-correct.    Activities of Daily Living:    Grooming Position: Standing at sink  Grooming Level of Assistance: Stand by assistance (to perform hand hygiene)      Balance:   Static Sit: (I) unsupported in chair  Dynamic Sit: (I) unsupported in chair  Static Stand: SBA  Dynamic stand: SBA    Therapeutic Activities and Exercises:  - Pt updated on OT POC, as well as recommendation of home health for anticipated D/C.  - Pt performed BUE exercises x12 reps for 2 sets of bicep curls, chest press, shoulder raises to 90*, and horizontal abduction.   - Pt performed BLE exercises,  For 2 sets x10 reps of marches, knee extensions, and ankle pumps.   - Pt educated on self-pacing and deep breathing strategies for promoting endurance and increasing activity tolerance.    AM-PAC  "6 CLICK ADL   How much help from another person does this patient currently need?   1 = Unable, Total/Dependent Assistance  2 = A lot, Maximum/Moderate Assistance  3 = A little, Minimum/Contact Guard/Supervision  4 = None, Modified [place]/Independent    Putting on and taking off regular lower body clothing? : 3  Bathing (including washing, rinsing, drying)?: 3  Toileting, which includes using toilet, bedpan, or urinal? : 3  Putting on and taking off regular upper body clothing?: 3  Taking care of personal grooming such as brushing teeth?: 3  Eating meals?: 3  Total Score: 18     AM-PAC Raw Score CMS "G-Code Modifier Level of Impairment Assistance   6 % Total / Unable   7 - 8 CM 80 - 100% Maximal Assist   9-13 CL 60 - 80% Moderate Assist   14 - 19 CK 40 - 60% Moderate Assist   20 - 22 CJ 20 - 40% Minimal Assist   23 CI 1-20% SBA / CGA   24 CH 0% Independent/ Mod I       Patient left up in chair with all lines intact and call button in reach    ASSESSMENT:  Martin Hendrix Jr. is a 56 y.o. male with a medical diagnosis of Lung transplanted and presents with improving endurance during functional daily activities. He continues to exhibit chest pain from sternotomy, however tolerates tasks well, and (I)'ly maintains sternal precautions. He would continue to benefit from skilled OT services to maximize return to PLOF as well as valued roles and routines.    Rehab identified problem list/impairments: Rehab identified problem list/impairments: impaired endurance, weakness, impaired self care skills, impaired functional mobilty, impaired cardiopulmonary response to activity, pain, decreased upper extremity function, decreased lower extremity function, impaired balance    Rehab potential is excellent.    Activity tolerance: Good    Discharge recommendations: Discharge Facility/Level Of Care Needs: home with home health     Barriers to discharge: Barriers to Discharge: None    Equipment recommendations: none "     GOALS:    Occupational Therapy Goals        Problem: Occupational Therapy Goal    Goal Priority Disciplines Outcome Interventions   Occupational Therapy Goal     OT, PT/OT Ongoing (interventions implemented as appropriate)    Description:  Goals to be met by: 9/1/2017     Patient will increase functional independence with ADLs by performing:    UE Dressing with Set-up Assistance.  LE Dressing with Stand by Assistance.  Grooming while standing with Stand by Assistance. - MET 8/28  Toileting from toilet with Minimal Assistance for hygiene and clothing management. - MET 8/28   Toilet transfer to toilet with Supervision.  Supine to sit with Stand by assistance.                        Plan:  Patient to be seen 5 x/week to address the above listed problems via self-care/home management, therapeutic activities, therapeutic exercises, neuromuscular re-education  Plan of Care expires: 09/25/17  Plan of Care reviewed with: patient, daughter, mother         EDNA Weber  08/31/2017

## 2017-08-31 NOTE — PLAN OF CARE
Problem: Patient Care Overview  Goal: Plan of Care Review  Outcome: Ongoing (interventions implemented as appropriate)    Pt AAOx4   Daughter has left the bedside to go sleep in [place] for the night.  Per pt,daughter did not get to pull self meds before she left yesterday but daughter will be back again this morning.     Midsternal incision FISH with steri strips CDI.  Sternal precautions maintained.    Occlusive CT dressing CDI.  Pt has R & L Pleural CTs to water seal.  Both are putting out serosanguinous.  R has only put out 13 mL thus far and L has only put out 9 mL.     VSS:  Afebrile  BP 1teens-120's/70's-80's  Pt on tele running sinus rhythm with hr ranging in the 70's  Pt satting 96-97% on room air.    Pt supposed to start on home CPAP machine tonight but was unable to tolerate.   Pt stated that it was causing him to cough and coughing was causing him to hurt.  Pt received 7.5mg percocet for c/o 4/10 pain.     Pt voids per urinal. Pt has put out 750 mL of urine thus far.  No BM tonight. Last BM during the day 8/30    Pt received IV Vanc and IV Cipro tonight.    Pt speaks at volume of a whisper. Difficult to understand at times.  Pt working with Speech therapy.  Pt requested help with ordering his breakfast tray in the AM since they cannot hear him on the phone.     Pt remained free from falls or injury thus far.   Bed is in low/ locked position, side rails are up x 2, call light is in reach.   Will continue to monitor.

## 2017-08-31 NOTE — SUBJECTIVE & OBJECTIVE
Subjective:     Interval History:     No acute events overnight. Still having issues with his voice.     Continuous Infusions:     Scheduled Meds:   aspirin  81 mg Oral Daily    chlorhexidine  15 mL Mouth/Throat BID    ciprofloxacin  400 mg Intravenous Q12H    dornase alpha  2.5 mg Inhalation BID    enoxaparin  40 mg Subcutaneous Daily    famotidine  20 mg Oral BID    fluconazole  400 mg Oral Daily    folic acid  1 mg Oral Daily    guaifenesin  600 mg Oral BID    levalbuterol  1.25 mg Nebulization Q8H    magnesium oxide  400 mg Oral BID    mycophenolate  500 mg Oral BID    predniSONE  40 mg Oral Daily    sulfamethoxazole-trimethoprim 800-160mg  1 tablet Oral Every Mon, Wed, Fri    tacrolimus  1 mg Oral BID    valganciclovir  450 mg Oral BID    vancomycin (VANCOCIN) IVPB  1,250 mg Intravenous Q12H     PRN Meds:acetaminophen, dextrose 50%, dextrose 50%, glucagon (human recombinant), glucose, glucose, HYDROmorphone, insulin aspart, magnesium sulfate IVPB **AND** magnesium sulfate IVPB, ondansetron, oxycodone-acetaminophen, potassium chloride 10% **AND** potassium chloride 10% **AND** potassium chloride 10%, promethazine (PHENERGAN) IVPB, tramadol    Review of patient's allergies indicates:  No Known Allergies    Review of Systems   Constitutional: Negative for appetite change, chills, fatigue, fever and unexpected weight change.   HENT: Positive for voice change. Negative for congestion, ear pain, hearing loss, mouth sores and postnasal drip.    Eyes: Negative for photophobia, pain, discharge, redness, itching and visual disturbance.   Respiratory: Negative for cough, chest tightness and shortness of breath.    Cardiovascular: Positive for chest pain (at incision sites) and leg swelling. Negative for palpitations.   Gastrointestinal: Negative for abdominal distention, abdominal pain, blood in stool, constipation and diarrhea.   Endocrine: Negative for cold intolerance, heat intolerance, polydipsia,  polyphagia and polyuria.   Genitourinary: Negative for decreased urine volume, difficulty urinating, dysuria, frequency, hematuria and urgency.   Musculoskeletal: Negative for arthralgias and joint swelling.   Allergic/Immunologic: Positive for immunocompromised state. Negative for environmental allergies and food allergies.   Neurological: Negative for dizziness, tremors, syncope, speech difficulty, weakness and numbness.   Hematological: Negative for adenopathy. Does not bruise/bleed easily.   Psychiatric/Behavioral: Negative for agitation, confusion and hallucinations.     Objective:   Physical Exam   Constitutional: He is oriented to person, place, and time. He appears well-developed and well-nourished. No distress.   HENT:   Head: Normocephalic and atraumatic.   Nose: Nose normal.   Mouth/Throat: Oropharynx is clear and moist. No oropharyngeal exudate.   Eyes: Conjunctivae and EOM are normal. Pupils are equal, round, and reactive to light. No scleral icterus.   Neck: Normal range of motion. Neck supple. No JVD present. No tracheal deviation present. No thyromegaly present.   Cardiovascular: Normal rate, regular rhythm and normal heart sounds.    Pulmonary/Chest: Effort normal and breath sounds normal. No respiratory distress. He has no wheezes. He has no rales. He exhibits no tenderness.   Chest tubes in place with serosanguinous output and no air leaks.   Abdominal: Soft. Bowel sounds are normal. He exhibits no distension. There is no tenderness.   Musculoskeletal: Normal range of motion. He exhibits edema (trace BLE). He exhibits no tenderness.   Neurological: He is alert and oriented to person, place, and time. No cranial nerve deficit.   Skin: Skin is warm and dry. He is not diaphoretic.   Psychiatric: He has a normal mood and affect. Thought content normal.         Vital Signs (Most Recent):  Temp: 98 °F (36.7 °C) (08/31/17 0739)  Pulse: 83 (08/31/17 1100)  Resp: 18 (08/31/17 0932)  BP: 120/62 (08/31/17  0739)  SpO2: (!) 94 % (08/31/17 0932) Vital Signs (24h Range):  Temp:  [98 °F (36.7 °C)-98.9 °F (37.2 °C)] 98 °F (36.7 °C)  Pulse:  [68-88] 83  Resp:  [18-20] 18  SpO2:  [93 %-97 %] 94 %  BP: (115-143)/(62-87) 120/62     Weight: 101 kg (222 lb 10.6 oz)  Body mass index is 31.95 kg/m².      Intake/Output Summary (Last 24 hours) at 08/31/17 1228  Last data filed at 08/31/17 0559   Gross per 24 hour   Intake             1430 ml   Output             1856 ml   Net             -426 ml       Significant Labs:  CBC:    Recent Labs  Lab 08/31/17 0457   WBC 16.67*   RBC 3.27*   HGB 10.1*   HCT 29.8*      MCV 91   MCH 30.9   MCHC 33.9     BMP:    Recent Labs  Lab 08/31/17 0457   *   K 5.5*   CL 94*   CO2 31*   BUN 38*   CREATININE 1.3   CALCIUM 8.2*      Tacrolimus Levels:    Recent Labs  Lab 08/31/17 0457   TACROLIMUS 8.1     Microbiology:  Microbiology Results (last 7 days)     Procedure Component Value Units Date/Time    Fungus culture [[id number]] Collected:  08/23/17 0957    Order Status:  Completed Specimen:  Respiratory from Bronchial Wash Updated:  08/30/17 1001     Fungus (Mycology) Culture --     YEAST   Few  Identification pending      Narrative:       Bronchial Wash    Culture, Anaerobe [[id number]] Collected:  08/22/17 0814    Order Status:  Completed Specimen:  Tissue from Lung, Lingula Updated:  08/28/17 0922     Anaerobic Culture No anaerobes isolated    Narrative:       Bronchus of donor lung    Culture, Respiratory [[id number]] Collected:  08/23/17 0957    Order Status:  Completed Specimen:  Respiratory from Bronchial Wash Updated:  08/25/17 1103     Respiratory Culture --     STAPHYLOCOCCUS AUREUS  Moderate  Normal respiratory mary also present       Gram Stain (Respiratory) Few WBC's     Gram Stain (Respiratory) No organisms seen    Narrative:       Bronchial Wash    AFB Culture & Smear [[id number]] Collected:  08/23/17 0957    Order Status:  Completed Specimen:  Respiratory from Bronchial Wash  Updated:  08/24/17 2127     AFB Culture & Smear Culture in progress     AFB CULTURE STAIN No acid fast bacilli seen.    Narrative:       Bronchial Wash    Aerobic culture [[id number]]  (Susceptibility) Collected:  08/22/17 0816    Order Status:  Completed Specimen:  Tissue from Lung, Lingula Updated:  08/24/17 1304     Aerobic Bacterial Culture --     STAPHYLOCOCCUS AUREUS  Few      Narrative:       Bronchus of donor lung          I have reviewed all pertinent labs within the past 24 hours.    Diagnostic Results:  X-Ray: Reviewed   There is postoperative change, cardiomegaly, moderate edema, and no change.

## 2017-08-31 NOTE — PLAN OF CARE
Problem: Occupational Therapy Goal  Goal: Occupational Therapy Goal  Goals to be met by: 9/1/2017     Patient will increase functional independence with ADLs by performing:    UE Dressing with Set-up Assistance.  LE Dressing with Stand by Assistance.  Grooming while standing with Stand by Assistance. - MET 8/28  Toileting from toilet with Minimal Assistance for hygiene and clothing management. - MET 8/28   Toilet transfer to toilet with Supervision.  Supine to sit with Stand by assistance.       Outcome: Ongoing (interventions implemented as appropriate)  Pt is continuing to make progress towards goals. Goals remain appropriate, continue POC.   EDNA Chatman  8/31/2017

## 2017-08-31 NOTE — ASSESSMENT & PLAN NOTE
Solu-Medrol administered in the OR. Basiliximab in the ICU. Continue tacrolimus, mycophenolate, and corticosteroid taper

## 2017-08-31 NOTE — PT/OT/SLP PROGRESS
"Speech Language Pathology  Treatment    Martin Hendrix Jr.   MRN: [id number]   Admitting Diagnosis: Lung transplanted    Diet recommendations: Solid Diet Level: Regular  Liquid Diet Level: Thin   · No straws  · Double swallows between bites  · Pace bites/sips,  take break when feeling short of breath  · Only eat when awake/alert  · All PO intake at 90 degree angle  · Continue to monitor for signs and symptoms of aspiration and discontinue oral feeding should you notice any of the following: watery eyes, reddened facial area, wet vocal quality, increased work of breathing, change in respiratory status, increased congestion, coughing, fever, etc.     SLP Treatment Date: 08/31/17  Speech Start Time: 1400     Speech Stop Time: 1424     Speech Total (min): 24 min       TREATMENT BILLABLE MINUTES:  Speech Therapy Individual 9 and [facility] Care/Home Management Training 15    Has the patient been evaluated by SLP for swallowing? : Yes  Keep patient NPO?: No   General Precautions: Standard, aspiration, fall, sternal  Current Respiratory Status:  (room air)       Subjective:  SLP reviewed Patient with nurse prior to session, nurse reports Patient tolerating meals and medications w/o difficulty  Patient presents calm and highly motivated for therapy  He explains, "I was coughing a lot on the breathing machine [CPAP] last night so I took it off"  His daughter asks, "Do people usually have this much trouble talking after a surgery like this?"  Patient denies pain  Pain/Comfort  Pain Rating 1: 0/10  Pain Rating Post-Intervention 1: 0/10    Objective:   Patient found with: chest tube, peripheral IV. He presents alert and awake, sitting up in chair in room. Patient's daughter and mother in room with patient t/o session. Patient reports good appetite. He denies difficulty with meals. He recalls safe swallow precautions with 90% accuracy, MOD I.  He tolerates cup sips thin liquids x4 today w/o overt S/S aspiration using safe swallow " "strategies I'ly. Patient recalls 3 vocal hygiene tips reviewed day prior I'ly. His family was educated on ongoing vocal hygiene, avoidance of abusive vocal behaviors and voice therapy exercises.  Daughter asks questions about role of intubation and change in vocal quality and SLP answers questions. Patient and family verbalize understanding. Patient demonstrates deep relaxation exercises provided set-up. He is educated on monitoring of clavicular breathing and completes diaphragmatic breathing exercises x6 with average inhale of 1 second to exhale of 1:5 - 2 seconds each rep with set-up from SLP. He is education on vocal fx exercises and demonstrates severely reduced sustained phonation and reduced pitch variability (.5 - 1.5 seconds) across reps of sustained /i/ and pitch glides for "knoll" today with moderate verbal./visual cueing.  No further questions. Whiteboard current.     Assessment:  Martin Hendrix Jr. is a 56 y.o. male with a medical diagnosis of Lung transplanted and presents with Dysphonia and resolving Dysphagia. Ongoing strict aspiration precautions and vocal hygiene recommended.  ST to continue to follow.      Discharge recommendations: Discharge Facility/Level Of Care Needs: home health speech therapy   Goals:    SLP Goals        Problem: SLP Goal    Goal Priority Disciplines Outcome   SLP Goal     SLP Ongoing (interventions implemented as appropriate)   Description:  Speech Language Pathology Goals  Goals expected to be met by 9/1:  1. Pt will tolerate regular consistencies and thin liquids without overt s/s of aspiration, MOD I  2. Pt will participate in Modified Barium Swallow Study to r/o aspiration and determine safest PO diet. MET 8/25  3. Educate Patient on aspiration precautions and S/S aspiration   4. Educate patient on anatomy and physiology of vocal system and how vocal pathology affects voice production  5. Pt will complete vocal fold adduction and vocal function exercises with good " effort 90% of time, Supervision, to improve voice production                             Plan:   Patient to be seen Therapy Frequency: 5 x/week   Plan of Care expires: 09/23/17  Plan of Care reviewed with: patient, daughter, mother  SLP Follow-up?: Yes     SILVIANO Garcia, [facility]-SLP  Speech-Language Pathology  Pager: [phone]  8/31/2017

## 2017-08-31 NOTE — PLAN OF CARE
Problem: Patient Care Overview  Goal: Plan of Care Review  Outcome: Ongoing (interventions implemented as appropriate)  Patient OOB to chair all day. Ambulated in hallway twice so far today with PT/OT. Denies any incisional pain or complaints. Remains hoarse - SLP following. Left pleural CT d/c'd per Dr. Wolfe - right pleural remains in place with SS drainage, 45cc output this shift. Coordinator teaching completed today. Patient and daughter pulled self-meds this am with 100% accuracy.     Patient ambulated in hallway this afternoon with RN. Tolerated activity well.

## 2017-08-31 NOTE — PROGRESS NOTES
[facility]  Lung Transplant  Progress Note - Floor    Patient Name: Martin Hendrix Jr.  MRN: [id number]  Admission Date: 8/21/2017  Hospital Length of Stay: 10 days  Post-Operative Day: 9  Attending Physician: Darrick Wolfe MD  Primary Care Provider: Sukhi Dunham Jr, MD     Subjective:     Interval History:     No acute events overnight. Still having issues with his voice.     Continuous Infusions:     Scheduled Meds:   aspirin  81 mg Oral Daily    chlorhexidine  15 mL Mouth/Throat BID    ciprofloxacin  400 mg Intravenous Q12H    dornase alpha  2.5 mg Inhalation BID    enoxaparin  40 mg Subcutaneous Daily    famotidine  20 mg Oral BID    fluconazole  400 mg Oral Daily    folic acid  1 mg Oral Daily    guaifenesin  600 mg Oral BID    levalbuterol  1.25 mg Nebulization Q8H    magnesium oxide  400 mg Oral BID    mycophenolate  500 mg Oral BID    predniSONE  40 mg Oral Daily    sulfamethoxazole-trimethoprim 800-160mg  1 tablet Oral Every Mon, Wed, Fri    tacrolimus  1 mg Oral BID    valganciclovir  450 mg Oral BID    vancomycin (VANCOCIN) IVPB  1,250 mg Intravenous Q12H     PRN Meds:acetaminophen, dextrose 50%, dextrose 50%, glucagon (human recombinant), glucose, glucose, HYDROmorphone, insulin aspart, magnesium sulfate IVPB **AND** magnesium sulfate IVPB, ondansetron, oxycodone-acetaminophen, potassium chloride 10% **AND** potassium chloride 10% **AND** potassium chloride 10%, promethazine (PHENERGAN) IVPB, tramadol    Review of patient's allergies indicates:  No Known Allergies    Review of Systems   Constitutional: Negative for appetite change, chills, fatigue, fever and unexpected weight change.   HENT: Positive for voice change. Negative for congestion, ear pain, hearing loss, mouth sores and postnasal drip.    Eyes: Negative for photophobia, pain, discharge, redness, itching and visual disturbance.   Respiratory: Negative for cough, chest tightness and shortness of breath.     Cardiovascular: Positive for chest pain (at incision sites) and leg swelling. Negative for palpitations.   Gastrointestinal: Negative for abdominal distention, abdominal pain, blood in stool, constipation and diarrhea.   Endocrine: Negative for cold intolerance, heat intolerance, polydipsia, polyphagia and polyuria.   Genitourinary: Negative for decreased urine volume, difficulty urinating, dysuria, frequency, hematuria and urgency.   Musculoskeletal: Negative for arthralgias and joint swelling.   Allergic/Immunologic: Positive for immunocompromised state. Negative for environmental allergies and food allergies.   Neurological: Negative for dizziness, tremors, syncope, speech difficulty, weakness and numbness.   Hematological: Negative for adenopathy. Does not bruise/bleed easily.   Psychiatric/Behavioral: Negative for agitation, confusion and hallucinations.     Objective:   Physical Exam   Constitutional: He is oriented to person, place, and time. He appears well-developed and well-nourished. No distress.   HENT:   Head: Normocephalic and atraumatic.   Nose: Nose normal.   Mouth/Throat: Oropharynx is clear and moist. No oropharyngeal exudate.   Eyes: Conjunctivae and EOM are normal. Pupils are equal, round, and reactive to light. No scleral icterus.   Neck: Normal range of motion. Neck supple. No JVD present. No tracheal deviation present. No thyromegaly present.   Cardiovascular: Normal rate, regular rhythm and normal heart sounds.    Pulmonary/Chest: Effort normal and breath sounds normal. No respiratory distress. He has no wheezes. He has no rales. He exhibits no tenderness.   Chest tubes in place with serosanguinous output and no air leaks.   Abdominal: Soft. Bowel sounds are normal. He exhibits no distension. There is no tenderness.   Musculoskeletal: Normal range of motion. He exhibits edema (trace BLE). He exhibits no tenderness.   Neurological: He is alert and oriented to person, place, and time. No  cranial nerve deficit.   Skin: Skin is warm and dry. He is not diaphoretic.   Psychiatric: He has a normal mood and affect. Thought content normal.         Vital Signs (Most Recent):  Temp: 98 °F (36.7 °C) (08/31/17 0739)  Pulse: 83 (08/31/17 1100)  Resp: 18 (08/31/17 0932)  BP: 120/62 (08/31/17 0739)  SpO2: (!) 94 % (08/31/17 0932) Vital Signs (24h Range):  Temp:  [98 °F (36.7 °C)-98.9 °F (37.2 °C)] 98 °F (36.7 °C)  Pulse:  [68-88] 83  Resp:  [18-20] 18  SpO2:  [93 %-97 %] 94 %  BP: (115-143)/(62-87) 120/62     Weight: 101 kg (222 lb 10.6 oz)  Body mass index is 31.95 kg/m².      Intake/Output Summary (Last 24 hours) at 08/31/17 1228  Last data filed at 08/31/17 0559   Gross per 24 hour   Intake             1430 ml   Output             1856 ml   Net             -426 ml       Significant Labs:  CBC:    Recent Labs  Lab 08/31/17  0457   WBC 16.67*   RBC 3.27*   HGB 10.1*   HCT 29.8*      MCV 91   MCH 30.9   MCHC 33.9     BMP:    Recent Labs  Lab 08/31/17  0457   *   K 5.5*   CL 94*   CO2 31*   BUN 38*   CREATININE 1.3   CALCIUM 8.2*      Tacrolimus Levels:    Recent Labs  Lab 08/31/17  0457   TACROLIMUS 8.1     Microbiology:  Microbiology Results (last 7 days)     Procedure Component Value Units Date/Time    Fungus culture [[id number]] Collected:  08/23/17 0957    Order Status:  Completed Specimen:  Respiratory from Bronchial Wash Updated:  08/30/17 1001     Fungus (Mycology) Culture --     YEAST   Few  Identification pending      Narrative:       Bronchial Wash    Culture, Anaerobe [[id number]] Collected:  08/22/17 0814    Order Status:  Completed Specimen:  Tissue from Lung, Lingula Updated:  08/28/17 0922     Anaerobic Culture No anaerobes isolated    Narrative:       Bronchus of donor lung    Culture, Respiratory [[id number]] Collected:  08/23/17 0957    Order Status:  Completed Specimen:  Respiratory from Bronchial Wash Updated:  08/25/17 1103     Respiratory Culture --     STAPHYLOCOCCUS  AUREUS  Moderate  Normal respiratory mary also present       Gram Stain (Respiratory) Few WBC's     Gram Stain (Respiratory) No organisms seen    Narrative:       Bronchial Wash    AFB Culture & Smear [[id number]] Collected:  08/23/17 0957    Order Status:  Completed Specimen:  Respiratory from Bronchial Wash Updated:  08/24/17 2127     AFB Culture & Smear Culture in progress     AFB CULTURE STAIN No acid fast bacilli seen.    Narrative:       Bronchial Wash    Aerobic culture [[id number]]  (Susceptibility) Collected:  08/22/17 0816    Order Status:  Completed Specimen:  Tissue from Lung, Lingula Updated:  08/24/17 1304     Aerobic Bacterial Culture --     STAPHYLOCOCCUS AUREUS  Few      Narrative:       Bronchus of donor lung          I have reviewed all pertinent labs within the past 24 hours.    Diagnostic Results:  X-Ray: Reviewed   There is postoperative change, cardiomegaly, moderate edema, and no change.      Assessment/Plan:     * Lung transplanted    There is no evidence of graft dysfunction. Currently on room air.  Continue CPT, incentive spirometer, and bronchodilators. Continue to monitor pleural chest tube output. Will remove left sided tube today.        Immunosuppression    Solu-Medrol administered in the OR. Basiliximab in the ICU. Continue tacrolimus, mycophenolate, and corticosteroid taper         Prophylactic antibiotic    Bactrim, valganciclovir, and diflucan for opportunistic infection prophylaxis. Continue ciprofloxacin and Vancomycin.          Leukocytosis    Expected after transplant. Will continue to monitor.         Hyperglycemia    Appreciate input from endocrinology        Paroxysmal atrial fibrillation    Currently in NSR. No therapy at this moment.        Dysphonia    Secondary to left vocal cord dysfunction. SLP cleared him for a regular diet. ENT following        AVILA (acute kidney injury)    Likely form ATN after surgery. His creatinine is improving and he is making good urine.         Hyponatremia    Likely from AVILA, will continue to monitor.        Hyperkalemia    Likely from AVILA, no interventions unless >6            Darrick Wolfe MD  Lung Transplant  [facility]

## 2017-08-31 NOTE — ASSESSMENT & PLAN NOTE
There is no evidence of graft dysfunction. Currently on room air.  Continue CPT, incentive spirometer, and bronchodilators. Continue to monitor pleural chest tube output. Will remove left sided tube today.

## 2017-08-31 NOTE — PT/OT/SLP PROGRESS
"Physical Therapy  Treatment    Martin Hendrix Jr.   MRN: [id number]   Admitting Diagnosis: Lung transplanted    PT Received On: 08/31/17  PT Start Time: 1445     PT Stop Time: 1525    PT Total Time (min): 40 min       Billable Minutes:  Gait Training 16 and Therapeutic Exercise 24    Treatment Type: Treatment  PT/PTA: PT             General Precautions: Standard, aspiration, fall, sternal, contact  Orthopedic Precautions: N/A   Braces: N/A    Do you have any cultural, spiritual, Jewish conflicts, given your current situation?: none noted    Subjective:  Communicated with nursing prior to session.  "I use the hallway railings when I walk."    Pain/Comfort  Pain Rating 1: 0/10  Pain Rating Post-Intervention 1: 0/10    Objective:   Patient found with: chest tube, peripheral IV    Functional Mobility:  Bed Mobility:   Supine to Sit:  (Pt presented in bathroom)    Transfers:  Sit <> Stand Assistance: Supervision (While maintaining sternal precautions)  Sit <> Stand Assistive Device: No Assistive Device  Bed <> Chair Technique: Stand Pivot  Bed <> Chair Assistance: Stand By Assistance  Bed <> Chair Assistive Device: No Assistive Device  Toilet Transfer Technique: Stand Pivot  Toilet Transfer Assistance: Independent  Toilet Transfer Assistive Device: No Assistive Device    Gait:   Gait Distance: Pt amb 192', 222' with sitting rest break and therex bn trials.  Ed on deep and pursed lip breathing  Assistance 1: Stand by Assistance, Contact Guard Assistance  Gait Assistive Device: No device - use of hallways railings for support  Gait Pattern: swing-through gait  Gait Deviation(s): decreased bernadette, decreased step length, decreased stride length    Balance:   Static Sit: FAIR+: Able to take MINIMAL challenges from all directions  Dynamic Sit: FAIR+: Maintains balance through MINIMAL excursions of active trunk motion  Static Stand: FAIR: Maintains without assist but unable to take challenges  Dynamic stand: FAIR: Needs " CONTACT GUARD during gait     Therapeutic Activities and Exercises:  Whiteboard updated  Sternal precautions: 3/3 verbalized with minimal cuing  Incentive spirometer: 10xs, 500mL, ed on purpose of spirometry  Ankle pumps: 20 reps, ed on purpose  LAQs: 20 reps each LE perf individually, in sit, unable to achieve full kn ext  Hip flex: 20 reps each LE perf individually, in sit  Hip abd/add: 20 reps each LE perf individually, in sit  Sit-ups: 20 reps, with Christina to perf    AM-PAC 6 CLICK MOBILITY  How much help from another person does this patient currently need?   1 = Unable, Total/Dependent Assistance  2 = A lot, Maximum/Moderate Assistance  3 = A little, Minimum/Contact Guard/Supervision  4 = None, Modified Jasper/Independent    Turning over in bed (including adjusting bedclothes, sheets and blankets)?: 3  Sitting down on and standing up from a chair with arms (e.g., wheelchair, bedside commode, etc.): 4  Moving from lying on back to sitting on the side of the bed?: 3  Moving to and from a bed to a chair (including a wheelchair)?: 4  Need to walk in hospital room?: 3  Climbing 3-5 steps with a railing?: 2  Total Score: 19    AM-PAC Raw Score CMS G-Code Modifier Level of Impairment Assistance   6 % Total / Unable   7 - 9 CM 80 - 100% Maximal Assist   10 - 14 CL 60 - 80% Moderate Assist   15 - 19 CK 40 - 60% Moderate Assist   20 - 22 CJ 20 - 40% Minimal Assist   23 CI 1-20% SBA / CGA   24 CH 0% Independent/ Mod I     Patient left up in chair with all lines intact, call button in reach, nursing notified and family present.    Assessment:  Martin Hendrix Jr. is a 56 y.o. male with a medical diagnosis of Lung transplanted.  Pt made great improvements regarding ability and tolerance to amb further distances.  Ed to pt to use time as an objective measure of gait rather than distance.  Noted SOB during gait training, ed pt on pursed lip and deep breathing.  Introduction to HEP, will require re-ed for proper  tech to be established. Pt will cont to benefit from skilled PT services to improve functional transfers and gait.      Rehab identified problem list/impairments: Rehab identified problem list/impairments: weakness, impaired endurance, impaired cardiopulmonary response to activity, decreased lower extremity function, impaired functional mobilty, impaired balance    Rehab potential is fair.    Activity tolerance: Fair    Discharge recommendations: Discharge Facility/Level Of Care Needs: home health PT     Barriers to discharge: Barriers to Discharge: None    Equipment recommendations: Equipment Needed After Discharge: none     GOALS:    Physical Therapy Goals        Problem: Physical Therapy Goal    Goal Priority Disciplines Outcome Goal Variances Interventions   Physical Therapy Goal     PT/OT, PT Ongoing (interventions implemented as appropriate)     Description:  Goals to be met by: 17     Patient will increase functional independence with mobility by performin. Supine to sit with Stand-by Assistance  2. Sit to supine with Stand-by Assistance  3. Sit to stand transfer with Stand-by Assistance - Met   Updated: Sit to stand transfer with Mod I.  4. Bed to chair transfer with Stand-by Assistance - Met   Updated: Bed to chair transfer with Mod I.  5. Gait  x 150 feet with Stand-by Assistance with rest breaks as needed.    6. Lower extremity exercise program x 15 reps per handout, with independence  7. Patient to recall 3/3 sternal precautions without verbal cues.                       PLAN:    Patient to be seen 4 x/week  to address the above listed problems via gait training, therapeutic activities, therapeutic exercises, neuromuscular re-education  Plan of Care expires: 17  Plan of Care reviewed with: patient, daughter, mother         Pratibha Duong, PT  2017

## 2017-09-01 VITALS
DIASTOLIC BLOOD PRESSURE: 80 MMHG | HEIGHT: 70 IN | OXYGEN SATURATION: 98 % | HEART RATE: 83 BPM | BODY MASS INDEX: 31.91 KG/M2 | SYSTOLIC BLOOD PRESSURE: 135 MMHG | RESPIRATION RATE: 16 BRPM | TEMPERATURE: 98 F | WEIGHT: 222.88 LBS

## 2017-09-01 LAB
ALBUMIN SERPL BCP-MCNC: 2.3 G/DL
ALP SERPL-CCNC: 66 U/L
ALT SERPL W/O P-5'-P-CCNC: 22 U/L
ANION GAP SERPL CALC-SCNC: 5 MMOL/L
AST SERPL-CCNC: 23 U/L
BASOPHILS # BLD AUTO: 0.01 K/UL
BASOPHILS NFR BLD: 0.1 %
BILIRUB SERPL-MCNC: 0.5 MG/DL
BUN SERPL-MCNC: 34 MG/DL
CALCIUM SERPL-MCNC: 8.6 MG/DL
CHLORIDE SERPL-SCNC: 96 MMOL/L
CO2 SERPL-SCNC: 30 MMOL/L
CREAT SERPL-MCNC: 1.1 MG/DL
DIFFERENTIAL METHOD: ABNORMAL
EOSINOPHIL # BLD AUTO: 0.4 K/UL
EOSINOPHIL NFR BLD: 3.2 %
ERYTHROCYTE [DISTWIDTH] IN BLOOD BY AUTOMATED COUNT: 14 %
EST. GFR  (AFRICAN AMERICAN): >60 ML/MIN/1.73 M^2
EST. GFR  (NON AFRICAN AMERICAN): >60 ML/MIN/1.73 M^2
GLUCOSE SERPL-MCNC: 126 MG/DL
HCT VFR BLD AUTO: 29.5 %
HGB BLD-MCNC: 10.1 G/DL
LYMPHOCYTES # BLD AUTO: 1.1 K/UL
LYMPHOCYTES NFR BLD: 9.4 %
MAGNESIUM SERPL-MCNC: 1.7 MG/DL
MCH RBC QN AUTO: 30.4 PG
MCHC RBC AUTO-ENTMCNC: 34.2 G/DL
MCV RBC AUTO: 89 FL
MONOCYTES # BLD AUTO: 0.9 K/UL
MONOCYTES NFR BLD: 7.2 %
NEUTROPHILS # BLD AUTO: 9.5 K/UL
NEUTROPHILS NFR BLD: 78.7 %
PLATELET # BLD AUTO: 396 K/UL
PMV BLD AUTO: 8.5 FL
POCT GLUCOSE: 152 MG/DL (ref 70–110)
POCT GLUCOSE: 195 MG/DL (ref 70–110)
POCT GLUCOSE: 97 MG/DL (ref 70–110)
POTASSIUM SERPL-SCNC: 5 MMOL/L
PROT SERPL-MCNC: 5.4 G/DL
RBC # BLD AUTO: 3.32 M/UL
SODIUM SERPL-SCNC: 131 MMOL/L
TACROLIMUS BLD-MCNC: 7.9 NG/ML
VANCOMYCIN TROUGH SERPL-MCNC: 14.2 UG/ML
WBC # BLD AUTO: 12.03 K/UL

## 2017-09-01 PROCEDURE — 97530 THERAPEUTIC ACTIVITIES: CPT

## 2017-09-01 PROCEDURE — 63600175 PHARM REV CODE 636 W HCPCS: Performed by: NURSE PRACTITIONER

## 2017-09-01 PROCEDURE — 80202 ASSAY OF VANCOMYCIN: CPT

## 2017-09-01 PROCEDURE — 25000003 PHARM REV CODE 250: Performed by: INTERNAL MEDICINE

## 2017-09-01 PROCEDURE — 25000242 PHARM REV CODE 250 ALT 637 W/ HCPCS: Performed by: INTERNAL MEDICINE

## 2017-09-01 PROCEDURE — 94640 AIRWAY INHALATION TREATMENT: CPT

## 2017-09-01 PROCEDURE — 94668 MNPJ CHEST WALL SBSQ: CPT

## 2017-09-01 PROCEDURE — 83735 ASSAY OF MAGNESIUM: CPT

## 2017-09-01 PROCEDURE — 97535 SELF CARE MNGMENT TRAINING: CPT

## 2017-09-01 PROCEDURE — 25000242 PHARM REV CODE 250 ALT 637 W/ HCPCS: Performed by: NURSE PRACTITIONER

## 2017-09-01 PROCEDURE — C1751 CATH, INF, PER/CENT/MIDLINE: HCPCS

## 2017-09-01 PROCEDURE — 36415 COLL VENOUS BLD VENIPUNCTURE: CPT

## 2017-09-01 PROCEDURE — 99232 SBSQ HOSP IP/OBS MODERATE 35: CPT | Mod: ,,, | Performed by: INTERNAL MEDICINE

## 2017-09-01 PROCEDURE — 36569 INSJ PICC 5 YR+ W/O IMAGING: CPT

## 2017-09-01 PROCEDURE — 85025 COMPLETE CBC W/AUTO DIFF WBC: CPT

## 2017-09-01 PROCEDURE — 25000003 PHARM REV CODE 250: Performed by: NURSE PRACTITIONER

## 2017-09-01 PROCEDURE — 63600175 PHARM REV CODE 636 W HCPCS: Performed by: INTERNAL MEDICINE

## 2017-09-01 PROCEDURE — 80197 ASSAY OF TACROLIMUS: CPT

## 2017-09-01 PROCEDURE — 80053 COMPREHEN METABOLIC PANEL: CPT

## 2017-09-01 PROCEDURE — 99900035 HC TECH TIME PER 15 MIN (STAT)

## 2017-09-01 PROCEDURE — 76937 US GUIDE VASCULAR ACCESS: CPT

## 2017-09-01 RX ORDER — SODIUM CHLORIDE 0.9 % (FLUSH) 0.9 %
10 SYRINGE (ML) INJECTION EVERY 6 HOURS
Status: DISCONTINUED | OUTPATIENT
Start: 2017-09-01 | End: 2017-09-01 | Stop reason: HOSPADM

## 2017-09-01 RX ORDER — OXYCODONE AND ACETAMINOPHEN 7.5; 325 MG/1; MG/1
1 TABLET ORAL EVERY 4 HOURS PRN
Qty: 40 TABLET | Refills: 0 | Status: CANCELLED | OUTPATIENT
Start: 2017-09-01

## 2017-09-01 RX ORDER — OXYCODONE AND ACETAMINOPHEN 7.5; 325 MG/1; MG/1
1 TABLET ORAL EVERY 4 HOURS PRN
Qty: 40 TABLET | Refills: 0 | Status: SHIPPED | OUTPATIENT
Start: 2017-09-01 | End: 2018-04-09

## 2017-09-01 RX ORDER — CIPROFLOXACIN 750 MG/1
750 TABLET, FILM COATED ORAL EVERY 12 HOURS
Qty: 14 TABLET | Refills: 0 | Status: SHIPPED | OUTPATIENT
Start: 2017-09-01 | End: 2017-09-11 | Stop reason: ALTCHOICE

## 2017-09-01 RX ORDER — SODIUM CHLORIDE 0.9 % (FLUSH) 0.9 %
10 SYRINGE (ML) INJECTION
Status: DISCONTINUED | OUTPATIENT
Start: 2017-09-01 | End: 2017-09-01 | Stop reason: HOSPADM

## 2017-09-01 RX ORDER — TACROLIMUS 0.5 MG/1
1.5 CAPSULE ORAL EVERY 12 HOURS
Qty: 180 CAPSULE | Refills: 11 | Status: SHIPPED | OUTPATIENT
Start: 2017-09-01 | End: 2017-09-19 | Stop reason: SDUPTHER

## 2017-09-01 RX ORDER — PREDNISONE 10 MG/1
TABLET ORAL
Qty: 100 TABLET | Refills: 11 | Status: ON HOLD | OUTPATIENT
Start: 2017-09-01 | End: 2017-11-06

## 2017-09-01 RX ORDER — TACROLIMUS 1 MG/1
1 CAPSULE ORAL EVERY 12 HOURS
Qty: 60 CAPSULE | Refills: 11 | Status: SHIPPED | OUTPATIENT
Start: 2017-09-01 | End: 2017-09-01 | Stop reason: DRUGHIGH

## 2017-09-01 RX ADMIN — ENOXAPARIN SODIUM 40 MG: 100 INJECTION SUBCUTANEOUS at 04:09

## 2017-09-01 RX ADMIN — MYCOPHENOLATE MOFETIL 500 MG: 250 CAPSULE ORAL at 08:09

## 2017-09-01 RX ADMIN — MAGNESIUM OXIDE TAB 400 MG (241.3 MG ELEMENTAL MG) 400 MG: 400 (241.3 MG) TAB at 08:09

## 2017-09-01 RX ADMIN — ASPIRIN 81 MG CHEWABLE TABLET 81 MG: 81 TABLET CHEWABLE at 08:09

## 2017-09-01 RX ADMIN — FAMOTIDINE 20 MG: 20 TABLET, FILM COATED ORAL at 08:09

## 2017-09-01 RX ADMIN — GUAIFENESIN 600 MG: 600 TABLET, EXTENDED RELEASE ORAL at 08:09

## 2017-09-01 RX ADMIN — CHLORHEXIDINE GLUCONATE 15 ML: 1.2 RINSE ORAL at 08:09

## 2017-09-01 RX ADMIN — PREDNISONE 30 MG: 20 TABLET ORAL at 08:09

## 2017-09-01 RX ADMIN — DORNASE ALFA 2.5 MG: 1 SOLUTION RESPIRATORY (INHALATION) at 11:09

## 2017-09-01 RX ADMIN — SULFAMETHOXAZOLE AND TRIMETHOPRIM 1 TABLET: 800; 160 TABLET ORAL at 08:09

## 2017-09-01 RX ADMIN — CIPROFLOXACIN 400 MG: 2 INJECTION, SOLUTION INTRAVENOUS at 03:09

## 2017-09-01 RX ADMIN — VALGANCICLOVIR 450 MG: 450 TABLET, FILM COATED ORAL at 08:09

## 2017-09-01 RX ADMIN — FLUCONAZOLE 400 MG: 200 TABLET ORAL at 08:09

## 2017-09-01 RX ADMIN — VANCOMYCIN HYDROCHLORIDE 1250 MG: 5 INJECTION, POWDER, LYOPHILIZED, FOR SOLUTION INTRAVENOUS at 12:09

## 2017-09-01 RX ADMIN — LEVALBUTEROL 1.25 MG: 1.25 SOLUTION, CONCENTRATE RESPIRATORY (INHALATION) at 11:09

## 2017-09-01 RX ADMIN — LEVALBUTEROL 1.25 MG: 1.25 SOLUTION, CONCENTRATE RESPIRATORY (INHALATION) at 12:09

## 2017-09-01 RX ADMIN — FOLIC ACID 1 MG: 1 TABLET ORAL at 08:09

## 2017-09-01 RX ADMIN — LEVALBUTEROL 1.25 MG: 1.25 SOLUTION, CONCENTRATE RESPIRATORY (INHALATION) at 04:09

## 2017-09-01 RX ADMIN — CIPROFLOXACIN 400 MG: 2 INJECTION, SOLUTION INTRAVENOUS at 02:09

## 2017-09-01 RX ADMIN — TACROLIMUS 1 MG: 1 CAPSULE ORAL at 08:09

## 2017-09-01 NOTE — PLAN OF CARE
Problem: Occupational Therapy Goal  Goal: Occupational Therapy Goal  Goals to be met by: 9/1/2017     Patient will increase functional independence with ADLs by performing:    UE Dressing with Set-up Assistance.- met 9/1  LE Dressing with Stand by Assistance. Met 9/1  Grooming while standing with Stand by Assistance. - MET 8/28  Toileting from toilet with Minimal Assistance for hygiene and clothing management. - MET 8/28   Toilet transfer to toilet with Supervision.- Met 9/1  Supine to sit with Stand by assistance.        Outcome: Ongoing (interventions implemented as appropriate)  Pt has progressed with all goals this date meeting all but one goal. Pt expressed no concerns and all questions answered by therapist. MDs present in room and report that pt is to be d/c'd 9/1/17    Comments: Jacquelyn Chaudhary, OT  9/1/2017

## 2017-09-01 NOTE — PROGRESS NOTES
Medication education was provided to Martin Hendrix Jr. and his caregiver(s).  Reviewed medication section of the Lung Transplant Education book that was provided.  Emphasized the importance of compliance, role of the blue medication card, concerns for drug interactions, and process of obtaining refills.  Counseled regarding Prograf (tacrolimus), Cellcept (mycophenolate), and prednisone, including directions for use, monitoring, how to handle missed doses, and side effects.  Martin Hendrix Jr. and his caregiver verbalized understanding and had the opportunity to ask questions.

## 2017-09-01 NOTE — DISCHARGE SUMMARY
DISCHARGE NOTE:    Martin Hendrix Jr. is a 56 y.o. male s/p BLT on 8/22/2017 (Lung) secondary to sarcoidosis.      Past Medical History:   Diagnosis Date    Atrial fibrillation 8/23/2017    On home oxygen therapy     KRISTYN on CPAP     Osteopenia     Pulmonary hypertension     Sarcoidosis     Shortness of breath        Allergies: Review of patient's allergies indicates:  No Known Allergies    Patient Pharmacy: [facility]    Discharge Medications:   Martin Hendrix Jr.   Home Medication Instructions JESUSITA:[id number]    Printed on:09/01/17 2248   Medication Information                      ACCU-CHEK DEANNE PLUS METER Misc  USE AS DIRECTED             ACCU-CHEK DEANNE PLUS TEST STRP Strp  CHECK BLOOD GLUCOSE ONCE DAILY             ACCU-CHEK SOFTCLIX LANCETS Misc  CHECK BLOOD GLUCOSE ONCE QD             aspirin (ECOTRIN) 81 MG EC tablet  Take 1 tablet (81 mg total) by mouth once daily.             calcium-vitamin D tablet 600 mg-200 units  Take 1 tablet by mouth 2 (two) times daily.             famotidine (PEPCID) 20 MG tablet  Take 1 tablet (20 mg total) by mouth 2 (two) times daily.             fluconazole (DIFLUCAN) 200 MG Tab  Take 2 tablets (400 mg total) by mouth once daily.             folic acid (FOLVITE) 1 MG tablet  Take 1 tablet (1 mg total) by mouth once daily.             guaifenesin (MUCINEX) 600 mg 12 hr tablet  Take 1 tablet (600 mg total) by mouth 2 (two) times daily.             magnesium oxide (MAG-OX) 400 mg tablet  Take 1 tablet (400 mg total) by mouth 2 (two) times daily.             multivitamin (THERAGRAN) per tablet  Take 1 tablet by mouth once daily.             mycophenolate (CELLCEPT) 250 mg Cap  Take 2 capsules (500 mg total) by mouth 2 (two) times daily.             predniSONE (DELTASONE) 10 MG tablet  Take by mouth. Taper:  30 mg x 21 d, 20 mg x 60 d, 15 mg x 30 d, 10 mg x 60 d, then 5 mg daily.             sulfamethoxazole-trimethoprim 800-160mg (BACTRIM DS) 800-160 mg  Tab  Take 1 tablet by mouth every Mon, Wed, Fri.             tacrolimus (PROGRAF) 0.5 MG Cap  Take 3 capsules (1.5 mg total) by mouth every 12 (twelve) hours.             valganciclovir (VALCYTE) 450 mg Tab  Take 1 tablet (450 mg total) by mouth 2 (two) times daily.                 Pharmacy Interventions/Recommendations:  1) Transplant Immunosuppression: tacrolimus, MMF, prednisone     2) Opportunistic Infection prophylaxis: fluconazole, SMX/TMP, valganciclovir    3) Patient Counseling/Education:  Patient and spouse.  Demonstrated the use of the BP cuff, thermometer.    4) Follow-Up/Discharge Needs:      5) Patient received prescriptions for:  Mariella    Martin and his caregiver verbalized their understanding and had the opportunity to ask questions.

## 2017-09-01 NOTE — PLAN OF CARE
Problem: Patient Care Overview  Goal: Plan of Care Review  Outcome: Outcome(s) achieved Date Met: 09/01/17  Pt received discharge paperwork. [facility] delivered IV ABX to bedside. No questions/concerns from patient or family members. Encouraged patient to attend follow-up appointments and call coordinator with any questions. Patient verbalized understanding. Wheelchairs brought to patient's bedside and discharged with family.

## 2017-09-01 NOTE — PROCEDURES
"Martin Hendrix Jr. is a 56 y.o. male patient.    Temp: 98.2 °F (36.8 °C) (09/01/17 0759)  Pulse: 82 (09/01/17 0759)  Resp: 18 (09/01/17 0759)  BP: 117/70 (09/01/17 0759)  SpO2: 95 % (09/01/17 0759)  Weight: 101.1 kg (222 lb 14.4 oz) (09/01/17 0347)  Height: 5' 10" (177.8 cm) (08/21/17 2000)    PICC  Date/Time: 9/1/2017 10:05 AM  Performed by: ANYA GERARD  Consent Done: Yes  Time out: Immediately prior to procedure a time out was called to verify the correct patient, procedure, equipment, support staff and site/side marked as required  Indications: med administration and vascular access  Anesthesia: local infiltration  Local anesthetic: lidocaine 1% without epinephrine  Anesthetic Total (mL): 3  Preparation: skin prepped with ChloraPrep  Skin prep agent dried: skin prep agent completely dried prior to procedure  Sterile barriers: all five maximum sterile barriers used - cap, mask, sterile gown, sterile gloves, and large sterile sheet  Hand hygiene: hand hygiene performed prior to central venous catheter insertion  Location details: right basilic  Catheter type: double lumen  Catheter size: 5 Fr  Catheter Length: 39cm    Ultrasound guidance: yes  Vessel Caliber: medium and patent, compressibility normal  Vascular Doppler: not done  Needle advanced into vessel with real time Ultrasound guidance.  Guidewire confirmed in vessel.  Image recorded and saved.  Sterile sheath used.  no esophageal manometryNumber of attempts: 1  Post-procedure: blood return through all ports, chlorhexidine patch and sterile dressing applied  Technical procedures used: 3cg  Specimens: No  Implants: No  Assessment: placement verified by x-ray  Complications: none        Carissa Flowers  9/1/2017    "

## 2017-09-01 NOTE — PROGRESS NOTES
Patient being discharged home today.  Discharge instructions and follow up plan reviewed with patient and his family (his daughter and his mother) as follows:  1.  Home health:  nursing care for PICC management and OT/PT ordered by Dr. Wolfe.  Patient denied having a preference for a home health agency.  Explained that he will be contacted by a home health representative once services are approved by his insurance provider.  2.  Vancomycin IV via PICC every 12 hours (0900 and 2100) through September 10, 2017 to complete treatment for MRSA infection.  Patient denied having a preference for an infusion provider and agreed to use [facility].  Explained that a nurse liaison from [facility] will meet with them to provide education about the home infusion process prior to discharge today.  3.  Follow up with ENT Dr. Veliz for vocal cord dysfunction to be scheduled.  4.  First post-transplant clinic appointments, including labs, chest x-ray, PFT and doctor visit, scheduled for Tuesday, September 5, 2017.  Discussed times, locations and special instructions (e.g., fast for labs, take morning Prograf dose after blood is drawn) for these appointments.  Provided verbal and written instructions re: this discharge plan.  Also reviewed wound care for midsternal incision and old chest tube sites, and reminded patient of our contact information and health changes that should be called into the transplant team.    The patient and his family members asked questions, which were answered to their satisfaction.  All verbalized their understanding of the information discussed and demonstrated their readiness for discharge.         1330:  Notified patient via telephone that his appointment with ENT Dr. Veliz has been scheduled for Monday, September 18 at 1540.  The patient repeated these appointment details back to me correctly, and he denied having any question.

## 2017-09-01 NOTE — CONSULTS
Double lumen PICC placed in right basilic vein of RUE, 39cm in length with 1cm exposed and 33cm arm circumference. Lot#[id number].

## 2017-09-01 NOTE — PLAN OF CARE
[facility]    HOME HEALTH ORDERS  FACE TO FACE ENCOUNTER    Patient Name: Martin Hendrix Jr.  YOB: 1960    Dr. Darrick Wolfe MD  [phone]    Discharging Team(s): Lung Transplant    Encounter Date: 09/01/2017    Admit to Home Health    Diagnoses:  Active Hospital Problems    Diagnosis  POA    *Lung transplanted [Z94.2]  Not Applicable     Priority: 1 - High    Immunosuppression [D89.9]  No     Priority: 2     Prophylactic antibiotic [Z79.2]  Not Applicable     Priority: 3     Leukocytosis [D72.829]  No     Priority: 4     Hyperglycemia [R73.9]  Yes     Priority: 5     Paroxysmal atrial fibrillation [I48.0]  No     Priority: 6     Dysphonia [R49.0]  No     Priority: 7     AVILA (acute kidney injury) [N17.9]  No     Priority: 8     Hyponatremia [E87.1]  No     Priority: 9     Hyperkalemia [E87.5]  No     Priority: 10     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  Follow-up Information     [facility] .    Specialties:  Pharmacist, DME Provider, IV Infusion  Contact information:  [address] [place] [address]  [phone]                     I have seen and examined this patient face to face today. My clinical findings that support the need for the home health skilled services and home bound status are the following:  Weakness/numbness causing balance and gait disturbance due to Surgery making it taxing to leave home.    Allergies:Review of patient's allergies indicates:  No Known Allergies    Diet: regular diet    Activities: activity as tolerated and sternal precautions    Nursing:   SN to complete comprehensive assessment including routine vital signs. Instruct  on disease process and s/s of complications to report to MD. Review/verify medication list sent home with the patient at time of discharge  and instruct patient/caregiver as needed. Frequency may be adjusted depending on start of care date.    Notify MD if SBP > 160 or < 90; DBP > 90 or < 50; HR > 120 or < 50; Temp > 100.4; Other:   Shortness of breath      CONSULTS:    Physical Therapy to evaluate and treat. Evaluate for home safety and equipment needs; Establish/upgrade home exercise program. Perform / instruct on therapeutic exercises, gait training, transfer training, and Range of Motion.  Occupational Therapy to evaluate and treat. Evaluate home environment for safety and equipment needs. Perform/Instruct on transfers, ADL training, ROM, and therapeutic exercises.    MISCELLANEOUS CARE:  Home Infusion Therapy:   SN to perform Infusion Therapy/PICC Line Care.  Review Picc Line Care & PICC Line Flush with patient.    Vancomycin 1250 mg IV via PICC every 12 hours (0900 and 2100) through September 10, 2017.   Patient will have labs drawn at [facility].     1.  Nursing services - PICC care on Friday, September 8, and nursing visits to monitor home infusion care.    2.  OT and PT     WOUND CARE ORDERS  n/a      Medications: Review discharge medications with patient and family and provide education.    Current Discharge Medication List      START taking these medications    Details   calcium-vitamin D tablet 600 mg-200 units Take 1 tablet by mouth 2 (two) times daily.  Qty: 60 tablet, Refills: 11      famotidine (PEPCID) 20 MG tablet Take 1 tablet (20 mg total) by mouth 2 (two) times daily.  Qty: 60 tablet, Refills: 11      fluconazole (DIFLUCAN) 200 MG Tab Take 2 tablets (400 mg total) by mouth once daily.  Qty: 60 tablet, Refills: 6      folic acid (FOLVITE) 1 MG tablet Take 1 tablet (1 mg total) by mouth once daily.  Qty: 30 tablet, Refills: 11      guaifenesin (MUCINEX) 600 mg 12 hr tablet Take 1 tablet (600 mg  total) by mouth 2 (two) times daily.  Qty: 60 tablet, Refills: 11      magnesium oxide (MAG-OX) 400 mg tablet Take 1 tablet (400 mg total) by mouth 2 (two) times daily.  Qty: 60 tablet, Refills: 11      mycophenolate (CELLCEPT) 250 mg Cap Take 2 capsules (500 mg total) by mouth 2 (two) times daily.  Qty: 120 capsule, Refills: 11      oxycodone-acetaminophen (PERCOCET) 7.5-325 mg per tablet Take 1 tablet by mouth every 4 (four) hours as needed.  Qty: 40 tablet, Refills: 0      predniSONE (DELTASONE) 10 MG tablet Take by mouth. Taper:  30 mg x 21 d, 20 mg x 60 d, 15 mg x 30 d, 10 mg x 60 d, then 5 mg daily.  Qty: 100 tablet, Refills: 11      sulfamethoxazole-trimethoprim 800-160mg (BACTRIM DS) 800-160 mg Tab Take 1 tablet by mouth every Mon, Wed, Fri.  Qty: 15 tablet, Refills: 11      tacrolimus (PROGRAF) 0.5 MG Cap Take 3 capsules (1.5 mg total) by mouth every 12 (twelve) hours.  Qty: 180 capsule, Refills: 11      valganciclovir (VALCYTE) 450 mg Tab Take 1 tablet (450 mg total) by mouth 2 (two) times daily.  Qty: 60 tablet, Refills: 11         CONTINUE these medications which have CHANGED    Details   aspirin (ECOTRIN) 81 MG EC tablet Take 1 tablet (81 mg total) by mouth once daily.  Qty: 30 tablet, Refills: 11         CONTINUE these medications which have NOT CHANGED    Details   ACCU-CHEK DEANNE PLUS METER Misc USE AS DIRECTED  Refills: 0      ACCU-CHEK DEANNE PLUS TEST STRP Strp CHECK BLOOD GLUCOSE ONCE DAILY  Refills: 5      ACCU-CHEK SOFTCLIX LANCETS Misc CHECK BLOOD GLUCOSE ONCE QD  Refills: 5      multivitamin (THERAGRAN) per tablet Take 1 tablet by mouth once daily.             I certify that this patient is confined to his home and needs intermittent skilled nursing care, physical therapy and occupational therapy.

## 2017-09-01 NOTE — PROGRESS NOTES
Discharge Note:    SW met with pt, daughter, and pt's mother to assess needs for discharge. Pt scheduled to discharge to home with IV antibiotic set up and HH. Pt reports no preference of either company. SW made referral to Carla with [employer]   Ph# [phone]/Fx# [phone]. [facility]  [facility] for [facility].    DEV reviewed discharge plan with pt. Pt aware of, and involved in discharge plan. Pt to discharge home with the assistance of daughter. Pt reports coping adequately with discharge at this time and was sitting up on chair and oriented x 4 with pleasant affect.  Pt verbalized no additional needs or concerns at this time.  Contact information provided. SW to remain available.

## 2017-09-01 NOTE — PT/OT/SLP PROGRESS
Occupational Therapy  Treatment    Martin Hendrix Jr.   MRN: [id number]   Admitting Diagnosis: Lung transplanted    OT Date of Treatment: 09/01/17   OT Start Time: 0845  OT Stop Time: 0914  OT Total Time (min): 29 min    Billable Minutes:  Self Care/Home Management 14 and Therapeutic Activity 15    General Precautions: Standard, fall, sternal, contact  Orthopedic Precautions: N/A  Braces: N/A       Subjective:  Communicated with RN prior to session.  Pt agreeable to therapy session  Pain/Comfort  Pain Rating 1: 0/10  Pain Rating Post-Intervention 1: 0/10    Objective:  Patient found with: chest tube, peripheral IV     Functional Mobility:  Bed Mobility:  Scooting/Bridging: Modified Independent   Pt found sitting UIC. Pt and therapist discussed bed mobility and supine <> sit transfer techniques. Pt stated that he has a flat bed at home but will elevated HOB using pillows to allow easier assistance for getting in/out of bed at home. Pt verbalized all sternal precautions and demonstrated understanding when discussing bed mobility.     Transfers:  Pt performed all functional transfers with (S) adhering to all sternal precautions and no noted LOB.   Toilet transfer: Supervision, no AD  Sit <> Stand: Supervision, no AD      Functional Ambulation: Pt ambulated ~304 ft with no AD, supervision, and no LOB. Pt required x 1 standing rest break due to decreased endurance and fatigue.     Activities of Daily Living:     UE Dressing Level of Assistance: Set-up Assistance (donned fresh gown like jacket sitting from bedside chair.)    LE Dressing Level of Assistance: Set-up Assistance (doff/donned B  socks sitting in bedside chair. )    Grooming Position: Standing at sink  Grooming Level of Assistance: Supervision (pt performed hand hygiene and face washing )     Toileting Where Assessed: Toilet  Toileting Level of Assistance: Modified independent (Pt able to manage clothing and complete hygiene )        Balance:   Static Sit:  "GOOD: Takes MODERATE challenges from all directions  Dynamic Sit: GOOD: Maintains balance through MODERATE excursions of active trunk movement  Static Stand: GOOD: Takes MODERATE challenges from all directions  Dynamic stand: GOOD: Needs SUPERVISION only during gait and able to self right with moderate     Therapeutic Activities and Exercises:  Pt educated by therapist on:   - Pt donned clean gown, gloves, mask and fresh socks prior to ambulating in hallway 2/2 contact precautions.  - Pt educated on role of OT, POC, and goals for therapy.     - Importance of OOB ax's with staff member assistance   - Pt completed ADLs and functional mobility for treatment session as noted above   - Pt educated on safety throughout functional transfer training and hand placement when completing functional transfers.   - Pt was educated on energy conservation techniques in order to incorporate into daily routine in the home environment.   -Pt verbalized and demonstrated understanding. Pt expressed no further concerns/questions.    AM-PAC 6 CLICK ADL   How much help from another person does this patient currently need?   1 = Unable, Total/Dependent Assistance  2 = A lot, Maximum/Moderate Assistance  3 = A little, Minimum/Contact Guard/Supervision  4 = None, Modified [place]/Independent    Putting on and taking off regular lower body clothing? : 4  Bathing (including washing, rinsing, drying)?: 3  Toileting, which includes using toilet, bedpan, or urinal? : 4  Putting on and taking off regular upper body clothing?: 4  Taking care of personal grooming such as brushing teeth?: 4  Eating meals?: 4  Total Score: 23     AM-PAC Raw Score CMS "G-Code Modifier Level of Impairment Assistance   6 % Total / Unable   7 - 8 CM 80 - 100% Maximal Assist   9-13 CL 60 - 80% Moderate Assist   14 - 19 CK 40 - 60% Moderate Assist   20 - 22 CJ 20 - 40% Minimal Assist   23 CI 1-20% SBA / CGA   24 CH 0% Independent/ Mod I       Patient left up in " chair with all lines intact, call button in reach, RN notified and MDs and family present    ASSESSMENT:  Martin Hendrix Jr. is a 56 y.o. male with a medical diagnosis of Lung transplanted. Pt tolerated session well and has met all OT goals this date. Pt demo'd high levels of (I) with functional mobility and self-care skills. Pt and family expressed no remaining questions or concerns this date. Pt to be d/c from acute OT 9/1/17 due to pt meeting all OT goals and [place] [facility] [place] per MDs. Upon discharge, pt will benefit from  for a home safety assessment upon returning to the home environment.     Rehab identified problem list/impairments: Rehab identified problem list/impairments: weakness, impaired endurance, impaired cardiopulmonary response to activity    Rehab potential is good.    Activity tolerance: Good    Discharge recommendations: Discharge Facility/Level Of Care Needs: home with home health     Barriers to discharge: Barriers to Discharge: None    Equipment recommendations: none     GOALS:    Occupational Therapy Goals     Not on file          Multidisciplinary Problems (Resolved)        Problem: Occupational Therapy Goal    Goal Priority Disciplines Outcome Interventions   Occupational Therapy Goal   (Resolved)     OT, PT/OT Outcome(s) achieved    Description:  Goals to be met by: 9/1/2017     Patient will increase functional independence with ADLs by performing:    UE Dressing with Set-up Assistance.- met 9/1  LE Dressing with Stand by Assistance. Met 9/1  Grooming while standing with Stand by Assistance. - MET 8/28  Toileting from toilet with Minimal Assistance for hygiene and clothing management. - MET 8/28   Toilet transfer to toilet with Supervision.- Met 9/1  Supine to sit with Stand by assistance. - Met                           Plan:  Plan of Care expires: 09/25/17  Plan of Care reviewed with: patient, daughter, parent       Jacquelyn Chaudhary, OT  09/01/2017

## 2017-09-01 NOTE — DISCHARGE SUMMARY
[facility]  Discharge Summary  General Surgery      Admit Date: 8/21/2017    Discharge Date and Time:  09/01/2017 1:09 PM    Attending Physician: Darrick Wolfe MD     Discharge Provider: Irwin Celestin    Reason for Admission: Bilateral Lung Transplant    Procedures Performed: Procedure(s) (LRB):  TRANSPLANT-LUNG (Bilateral)    Hospital Course (synopsis of major diagnoses, care, treatment, and services provided during the course of the hospital stay):   Lung transplanted     There is no evidence of graft dysfunction. Currently on room air. Remaining chest tube removed today.  Will need home PT/OT       Immunosuppression     Solu-Medrol administered in the OR. Basiliximab in the ICU. Continue tacrolimus, mycophenolate, and corticosteroid taper        Prophylactic antibiotic     Bactrim, valganciclovir, and diflucan for opportunistic infection prophylaxis. Will continue oral ciprofloxacin and IV Vancomycin as outpatient.       Leukocytosis     Expected after transplant.         Hyperglycemia     Appreciate input from endocrinology       Paroxysmal atrial fibrillation     Currently in NSR. No therapy at this moment.       Dysphonia     Secondary to left vocal cord dysfunction. SLP cleared him for a regular diet. ENT following       AVILA (acute kidney injury)     Likely form ATN after surgery. His creatinine is improving and he is making good urine.            Consults: dietary, ID, PT, OT, RT and endocrinology    Significant Diagnostic Studies: Labs:   BMP:   Recent Labs  Lab 08/31/17 0457 09/01/17 0618   * 126*   * 131*   K 5.5* 5.0   CL 94* 96   CO2 31* 30*   BUN 38* 34*   CREATININE 1.3 1.1   CALCIUM 8.2* 8.6*   MG 2.0 1.7   , CMP   Recent Labs  Lab 08/31/17 0457 09/01/17 0618   * 131*   K 5.5* 5.0   CL 94* 96   CO2 31* 30*   * 126*   BUN 38* 34*   CREATININE 1.3 1.1   CALCIUM 8.2* 8.6*   PROT 5.3* 5.4*   ALBUMIN 2.3* 2.3*   BILITOT 0.5 0.5   ALKPHOS 56 66   AST  25 23   ALT 17 22   ANIONGAP 4* 5*   ESTGFRAFRICA >60.0 >60.0   EGFRNONAA >60.0 >60.0    and CBC   Recent Labs  Lab 08/31/17  0457 09/01/17  0618   WBC 16.67* 12.03   HGB 10.1* 10.1*   HCT 29.8* 29.5*    396*     Microbiology:   Blood Culture No results found for: LABBLOO, Sputum Culture   Lab Results   Component Value Date    GSRESP Few WBC's 08/23/2017    GSRESP No organisms seen 08/23/2017    RESPIRATORYC  08/23/2017     STAPHYLOCOCCUS AUREUS  Moderate  Normal respiratory mary also present      and Urine Culture    Lab Results   Component Value Date    LABURIN No growth 08/21/2017     Radiology: X-Ray: CXR: X-Ray Chest 1 View (CXR):   Results for orders placed or performed during the hospital encounter of 08/21/17   X-Ray Chest 1 View for PICC_Central line    Narrative    Time of Procedure: 09/01/17 10:30:00  Accession # [id number]    Comparison:   Postoperative chest radiographs: 8/29/2017, 0943 hrs.  8/22/2017, 1450 hrs.  Preoperative chest radiograph: 8/21/2017.  4/24/2017.    Number of views: 1.    Findings:  PICC line placed via the RIGHT upper extremity has its tip overlying the RIGHT atrium.    Study was obtained with the patient in kyphotic position.  Sternal sutures appear intact and aligned with lower sternal sutures projected over the epigastrium.  Mediastinal structures are midline.    Subsegmental patchy opacity, 2 cm in long axis, projects over the LEFT midlung zone suggesting atelectasis although aspiration or pneumonia might present in a similar fashion.  I also suspect mild bibasal atelectasis.  No other focal process identified.    I detect no pneumothorax, pneumomediastinum, pneumoperitoneum or significant osseous abnormality.    Impression    Please see above.      Electronically signed by: Sena Baum MD  Date:     09/01/17  Time:    11:06     and X-Ray Chest PA and Lateral (CXR):   Results for orders placed or performed during the hospital encounter of 08/21/17   X-Ray Chest PA And  Lateral    Narrative    EXAM:  2 VIEW CHEST RADIOGRAPH.     CLINICAL INDICATION:  Status post lung transplant.    COMPARISON: 08/24/2017.    TECHNIQUE:  PA and lateral views of the chest were obtained.     FINDINGS: Cardiac silhouette is stable in size. Surgical changes of prior median sternotomy. There has been interval removal of the right central venous catheter. Bilateral thoracostomy tubes appear in stable position.  Nonspecific airspace opacity in the retrocardiac left lower lobe, more conspicuous on the current exam.  No pleural effusion or pneumothorax.  No acute bony abnormality is identified.    Impression    Retrocardiac left lower lobe airspace disease, possibly aspiration or pneumonia.      Electronically signed by: Donta Yañez  Date:     08/27/17  Time:    21:47        Final Diagnoses:   Principal Problem: Lung transplanted   Secondary Diagnoses:   Active Hospital Problems    Diagnosis  POA    *Lung transplanted [Z94.2]  Not Applicable     Priority: 1 - High    Immunosuppression [D89.9]  No     Priority: 2     Prophylactic antibiotic [Z79.2]  Not Applicable     Priority: 3     Leukocytosis [D72.829]  No     Priority: 4     Hyperglycemia [R73.9]  Yes     Priority: 5     Paroxysmal atrial fibrillation [I48.0]  No     Priority: 6     Dysphonia [R49.0]  No     Priority: 7     AVILA (acute kidney injury) [N17.9]  No     Priority: 8     Hyponatremia [E87.1]  No     Priority: 9     Hyperkalemia [E87.5]  No     Priority: 10     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  Qty: 60 tablet, Refills: 11      famotidine (PEPCID) 20 MG tablet Take 1 tablet (20 mg total) by mouth 2 (two) times daily.  Qty: 60 tablet, Refills: 11      fluconazole (DIFLUCAN) 200 MG Tab Take 2 tablets (400 mg total) by mouth once daily.  Qty: 60 tablet, Refills: 6      folic acid (FOLVITE) 1 MG tablet Take 1 tablet (1 mg total) by mouth once daily.  Qty: 30 tablet, Refills: 11      guaifenesin (MUCINEX) 600 mg 12 hr tablet Take 1 tablet (600 mg total) by mouth 2 (two) times daily.  Qty: 60 tablet, Refills: 11      magnesium oxide (MAG-OX) 400 mg tablet Take 1 tablet (400 mg total) by mouth 2 (two) times daily.  Qty: 60 tablet, Refills: 11      mycophenolate (CELLCEPT) 250 mg Cap Take 2 capsules (500 mg total) by mouth 2 (two) times daily.  Qty: 120 capsule, Refills: 11      oxycodone-acetaminophen (PERCOCET) 7.5-325 mg per tablet Take 1 tablet by mouth every 4 (four) hours as needed.  Qty: 40 tablet, Refills: 0      predniSONE (DELTASONE) 10 MG tablet Take by mouth. Taper:  30 mg x 21 d, 20 mg x 60 d, 15 mg x 30 d, 10 mg x 60 d, then 5 mg daily.  Qty: 100 tablet, Refills: 11      sulfamethoxazole-trimethoprim 800-160mg (BACTRIM DS) 800-160 mg Tab Take 1 tablet by mouth every Mon, Wed, Fri.  Qty: 15 tablet, Refills: 11      tacrolimus (PROGRAF) 0.5 MG Cap Take 3 capsules (1.5 mg total) by mouth every 12 (twelve) hours.  Qty: 180 capsule, Refills: 11      valganciclovir (VALCYTE) 450 mg Tab Take 1 tablet (450 mg total) by mouth 2 (two) times daily.  Qty: 60 tablet, Refills: 11         CONTINUE these medications which have CHANGED    Details   aspirin (ECOTRIN) 81 MG EC tablet Take 1 tablet (81 mg total) by mouth once daily.  Qty: 30 tablet, Refills: 11         CONTINUE these medications which have NOT CHANGED    Details   ACCU-CHEK DEANNE PLUS METER Misc USE AS DIRECTED  Refills: 0       ACCU-CHEK DEANNE PLUS TEST STRP Strp CHECK BLOOD GLUCOSE ONCE DAILY  Refills: 5      ACCU-CHEK SOFTCLIX LANCETS Misc CHECK BLOOD GLUCOSE ONCE QD  Refills: 5      multivitamin (THERAGRAN) per tablet Take 1 tablet by mouth once daily.             Discharge Procedure Orders  Ambulatory Referral to Infusion Dept   Referral Priority: Routine Referral Type: Consultation   Referral Reason: Specialty Services Required    Referred to Provider: [facility] Requested Specialty: IV Infusion   Number of Visits Requested: 1        Follow-up Information     [facility] .    Specialties:  Pharmacist, DME Provider, IV Infusion  Contact information:  [address] [id number] [phone]

## 2017-09-05 ENCOUNTER — HOSPITAL ENCOUNTER (OUTPATIENT)
Dept: RADIOLOGY | Facility: HOSPITAL | Age: 57
Discharge: HOME | End: 2017-09-05
Attending: INTERNAL MEDICINE
Payer: COMMERCIAL

## 2017-09-05 ENCOUNTER — PATIENT MESSAGE (OUTPATIENT)
Dept: TRANSPLANT | Facility: CLINIC | Age: 57
End: 2017-09-05

## 2017-09-05 ENCOUNTER — OFFICE VISIT (OUTPATIENT)
Dept: TRANSPLANT | Facility: CLINIC | Age: 57
End: 2017-09-05
Payer: COMMERCIAL

## 2017-09-05 ENCOUNTER — LAB VISIT (OUTPATIENT)
Dept: LAB | Facility: HOSPITAL | Age: 57
End: 2017-09-05
Attending: INTERNAL MEDICINE
Payer: COMMERCIAL

## 2017-09-05 ENCOUNTER — HOSPITAL ENCOUNTER (OUTPATIENT)
Dept: PULMONOLOGY | Facility: CLINIC | Age: 57
Discharge: HOME | End: 2017-09-05
Payer: COMMERCIAL

## 2017-09-05 VITALS
HEART RATE: 91 BPM | TEMPERATURE: 98 F | SYSTOLIC BLOOD PRESSURE: 146 MMHG | HEIGHT: 70 IN | RESPIRATION RATE: 20 BRPM | OXYGEN SATURATION: 97 % | DIASTOLIC BLOOD PRESSURE: 83 MMHG | WEIGHT: 215 LBS | BODY MASS INDEX: 30.78 KG/M2

## 2017-09-05 DIAGNOSIS — Z94.2 LUNG REPLACED BY TRANSPLANT: Primary | ICD-10-CM

## 2017-09-05 DIAGNOSIS — R60.0 BILATERAL EDEMA OF LOWER EXTREMITY: Primary | ICD-10-CM

## 2017-09-05 DIAGNOSIS — A49.02 MRSA INFECTION: ICD-10-CM

## 2017-09-05 DIAGNOSIS — Z94.2 LUNG REPLACED BY TRANSPLANT: ICD-10-CM

## 2017-09-05 DIAGNOSIS — R60.0 EDEMA OF BOTH LEGS: ICD-10-CM

## 2017-09-05 DIAGNOSIS — D84.9 IMMUNOSUPPRESSION: ICD-10-CM

## 2017-09-05 DIAGNOSIS — E83.42 HYPOMAGNESEMIA: ICD-10-CM

## 2017-09-05 DIAGNOSIS — Z94.2 LUNG TRANSPLANTED: ICD-10-CM

## 2017-09-05 DIAGNOSIS — Z48.298 ENCOUNTER FOLLOWING ORGAN TRANSPLANT: Primary | ICD-10-CM

## 2017-09-05 DIAGNOSIS — R39.11 URINARY HESITANCY: ICD-10-CM

## 2017-09-05 DIAGNOSIS — Z79.2 PROPHYLACTIC ANTIBIOTIC: ICD-10-CM

## 2017-09-05 DIAGNOSIS — J38.3 VOCAL CORD DYSFUNCTION: ICD-10-CM

## 2017-09-05 LAB
ABSOLUTE CD3: 570 CELLS/UL (ref 700–2100)
ALBUMIN SERPL BCP-MCNC: 2.8 G/DL
ALP SERPL-CCNC: 89 U/L
ALT SERPL W/O P-5'-P-CCNC: 45 U/L
ANION GAP SERPL CALC-SCNC: 5 MMOL/L
AST SERPL-CCNC: 35 U/L
BASOPHILS # BLD AUTO: 0.04 K/UL
BASOPHILS NFR BLD: 0.2 %
BILIRUB SERPL-MCNC: 0.5 MG/DL
BUN SERPL-MCNC: 22 MG/DL
CALCIUM SERPL-MCNC: 9.3 MG/DL
CD3%: 47.3 % (ref 55–83)
CHLORIDE SERPL-SCNC: 98 MMOL/L
CLASS I ANTIBODIES - LUMINEX: NEGATIVE
CLASS II ANTIBODIES - LUMINEX: NORMAL
CLASS II ANTIBODY COMMENTS - LUMINEX: NORMAL
CO2 SERPL-SCNC: 32 MMOL/L
CPRA %: 0
CREAT SERPL-MCNC: 1.2 MG/DL
DIFFERENTIAL METHOD: ABNORMAL
EOSINOPHIL # BLD AUTO: 0.1 K/UL
EOSINOPHIL NFR BLD: 0.7 %
ERYTHROCYTE [DISTWIDTH] IN BLOOD BY AUTOMATED COUNT: 14 %
EST. GFR  (AFRICAN AMERICAN): >60 ML/MIN/1.73 M^2
EST. GFR  (NON AFRICAN AMERICAN): >60 ML/MIN/1.73 M^2
GLUCOSE SERPL-MCNC: 127 MG/DL
HCT VFR BLD AUTO: 31.1 %
HGB BLD-MCNC: 10.3 G/DL
LYMPHOCYTES # BLD AUTO: 1.1 K/UL
LYMPHOCYTES NFR BLD: 6.4 %
MAGNESIUM SERPL-MCNC: 1.4 MG/DL
MCH RBC QN AUTO: 30.6 PG
MCHC RBC AUTO-ENTMCNC: 33.1 G/DL
MCV RBC AUTO: 92 FL
MONOCYTES # BLD AUTO: 1 K/UL
MONOCYTES NFR BLD: 5.9 %
NEUTROPHILS # BLD AUTO: 14.8 K/UL
NEUTROPHILS NFR BLD: 86 %
PLATELET # BLD AUTO: 396 K/UL
PMV BLD AUTO: 8.1 FL
POTASSIUM SERPL-SCNC: 4.7 MMOL/L
PRE FEV1 FVC: 85
PRE FEV1: 1.8
PRE FVC: 2.11
PREDICTED FEV1 FVC: 81
PREDICTED FEV1: 3.74
PREDICTED FVC: 4.6
PROT SERPL-MCNC: 6.1 G/DL
RBC # BLD AUTO: 3.37 M/UL
SERUM COLLECTION DT - LUMINEX CLASS I: NORMAL
SERUM COLLECTION DT - LUMINEX CLASS II: NORMAL
SODIUM SERPL-SCNC: 135 MMOL/L
SPCL1 TESTING DATE: NORMAL
SPCL2 TESTING DATE: NORMAL
TACROLIMUS BLD-MCNC: 13.2 NG/ML
VANCOMYCIN TROUGH SERPL-MCNC: 20.3 UG/ML
WBC # BLD AUTO: 17.2 K/UL

## 2017-09-05 PROCEDURE — 85025 COMPLETE CBC W/AUTO DIFF WBC: CPT

## 2017-09-05 PROCEDURE — 3008F BODY MASS INDEX DOCD: CPT | Mod: S$GLB,,, | Performed by: INTERNAL MEDICINE

## 2017-09-05 PROCEDURE — 71020 XR CHEST PA AND LATERAL: CPT | Mod: TC

## 2017-09-05 PROCEDURE — 99999 PR PBB SHADOW E&M-EST. PATIENT-LVL III: CPT | Mod: PBBFAC,,, | Performed by: INTERNAL MEDICINE

## 2017-09-05 PROCEDURE — 83735 ASSAY OF MAGNESIUM: CPT

## 2017-09-05 PROCEDURE — 80197 ASSAY OF TACROLIMUS: CPT

## 2017-09-05 PROCEDURE — 86352 CELL FUNCTION ASSAY W/STIM: CPT

## 2017-09-05 PROCEDURE — 71020 XR CHEST PA AND LATERAL: CPT | Mod: 26,,, | Performed by: RADIOLOGY

## 2017-09-05 PROCEDURE — 80053 COMPREHEN METABOLIC PANEL: CPT

## 2017-09-05 PROCEDURE — 99214 OFFICE O/P EST MOD 30 MIN: CPT | Mod: 25,S$GLB,, | Performed by: INTERNAL MEDICINE

## 2017-09-05 PROCEDURE — 86359 T CELLS TOTAL COUNT: CPT

## 2017-09-05 PROCEDURE — 36415 COLL VENOUS BLD VENIPUNCTURE: CPT

## 2017-09-05 PROCEDURE — 80202 ASSAY OF VANCOMYCIN: CPT

## 2017-09-05 PROCEDURE — 94010 BREATHING CAPACITY TEST: CPT | Mod: S$GLB,,, | Performed by: INTERNAL MEDICINE

## 2017-09-05 NOTE — PROGRESS NOTES
LUNG TRANSPLANT RECIPIENT FOLLOW-UP    Reason for Visit: Follow-up after lung transplantation.                                                                                                         Date of Transplant: 8/22/17                                                                               Reason for Transplant: Sarcoidosis with pulmonary hypertensions                                                                               Type of Transplant: bilateral sequential lung                                                                               CMV Status: D+ / R-                                                                               Major Complications:   1. Left vocal cord dysfunction                                                                               History of Present Illness: Martin Hendrix Jr. is a 56 y.o. year old male with a transplant history as outlined above who presents today for his first clinic visit after bilateral lung transplant. He says that he feels well. He denies shortness of breath or cough. He still has some pain and has takes his oxycodone sporadically. No problems with appetite. No issues with bowel movements. Tolerating his medications well. He is having swelling of his feet and stockings were recommended by OT. He also has noticed hesitancy when urinating.    Voice seems to be improving.     Review of Systems   Constitutional: Negative for chills, diaphoresis, fever, malaise/fatigue and weight loss.   HENT: Negative for congestion, ear discharge, ear pain, hearing loss, nosebleeds and sore throat.         Hoarse   Eyes: Negative for blurred vision, double vision and photophobia.   Respiratory: Negative for cough, hemoptysis, sputum production, shortness of breath and wheezing.    Cardiovascular: Positive for chest pain and leg swelling. Negative for palpitations, orthopnea, claudication and PND.   Gastrointestinal: Negative for abdominal pain, blood  "in stool, constipation, diarrhea, heartburn, melena, nausea and vomiting.   Genitourinary: Negative for dysuria, flank pain, frequency, hematuria and urgency.   Musculoskeletal: Positive for myalgias. Negative for back pain, falls, joint pain and neck pain.   Skin: Negative for itching and rash.   Neurological: Negative for dizziness, tremors, sensory change, loss of consciousness, weakness and headaches.   Endo/Heme/Allergies: Does not bruise/bleed easily.   Psychiatric/Behavioral: Negative for depression, hallucinations and memory loss. The patient is not nervous/anxious and does not have insomnia.      BP (!) 146/83 (BP Location: Left arm, Patient Position: Sitting, BP Method: Large (Automatic))   Pulse 91   Temp 97.7 °F (36.5 °C) (Oral)   Resp 20   Ht 5' 10" (1.778 m)   Wt 97.5 kg (215 lb)   SpO2 97%   BMI 30.85 kg/m²     Physical Exam   Constitutional: He is oriented to person, place, and time and well-developed, well-nourished, and in no distress. No distress.   HENT:   Head: Normocephalic.   Nose: Nose normal.   Mouth/Throat: Oropharynx is clear and moist. No oropharyngeal exudate.   Eyes: Conjunctivae and EOM are normal. Pupils are equal, round, and reactive to light. No scleral icterus.   Neck: Normal range of motion. Neck supple. No JVD present. No tracheal deviation present. No thyromegaly present.   Cardiovascular: Normal rate, regular rhythm and intact distal pulses.  Exam reveals no gallop and no friction rub.    No murmur heard.  Pulmonary/Chest: Effort normal. No stridor. No respiratory distress. He has no wheezes. He has no rales. He exhibits no tenderness.   Abdominal: Soft. Bowel sounds are normal. He exhibits no distension and no mass. There is no tenderness. There is no rebound and no guarding.   Musculoskeletal: Normal range of motion. He exhibits edema (+2).   Lymphadenopathy:     He has no cervical adenopathy.   Neurological: He is alert and oriented to person, place, and time. No " cranial nerve deficit. Gait normal. Coordination normal.   Skin: Skin is warm and dry. No rash noted. He is not diaphoretic. No erythema. No pallor.   Psychiatric: Mood and affect normal.     Labs:  cbc, cmp, tacrolimus Latest Ref Rng & Units 8/31/2017 9/1/2017 9/5/2017   TACROLIMUS LVL 5.0 - 15.0 ng/mL 8.1 7.9 -   WHITE BLOOD CELL COUNT 3.90 - 12.70 K/uL 16.67(H) 12.03 17.20(H)   RBC 4.60 - 6.20 M/uL 3.27(L) 3.32(L) 3.37(L)   HEMOGLOBIN 14.0 - 18.0 g/dL 10.1(L) 10.1(L) 10.3(L)   HEMATOCRIT 40.0 - 54.0 % 29.8(L) 29.5(L) 31.1(L)   MCV 82 - 98 fL 91 89 92   MCH 27.0 - 31.0 pg 30.9 30.4 30.6   MCHC 32.0 - 36.0 g/dL 33.9 34.2 33.1   RDW 11.5 - 14.5 % 13.7 14.0 14.0   PLATELETS 150 - 350 K/uL 291 396(H) 396(H)   MPV 9.2 - 12.9 fL 9.8 8.5(L) 8.1(L)   GRAN # 1.8 - 7.7 K/uL - 9.5(H) 14.8(H)   LYMPH # 1.0 - 4.8 K/uL CANCELED 1.1 1.1   MONO # 0.3 - 1.0 K/uL CANCELED 0.9 1.0   EOSINOPHIL% 0.0 - 8.0 % 0.0 3.2 0.7   BASOPHIL% 0.0 - 1.9 % 0.0 0.1 0.2   DIFFERENTIAL METHOD - Manual Automated Automated   SODIUM 136 - 145 mmol/L 129(L) 131(L) 135(L)   POTASSIUM 3.5 - 5.1 mmol/L 5.5(H) 5.0 4.7   CHLORIDE 95 - 110 mmol/L 94(L) 96 98   CO2 23 - 29 mmol/L 31(H) 30(H) 32(H)   GLUCOSE 70 - 110 mg/dL 132(H) 126(H) 127(H)   BUN BLD 6 - 20 mg/dL 38(H) 34(H) 22(H)   CREATININE 0.5 - 1.4 mg/dL 1.3 1.1 1.2   CALCIUM 8.7 - 10.5 mg/dL 8.2(L) 8.6(L) 9.3   PROTEIN TOTAL 6.0 - 8.4 g/dL 5.3(L) 5.4(L) 6.1   ALBUMIN 3.5 - 5.2 g/dL 2.3(L) 2.3(L) 2.8(L)   BILIRUBIN TOTAL 0.1 - 1.0 mg/dL 0.5 0.5 0.5   ALK PHOS 55 - 135 U/L 56 66 89   AST 10 - 40 U/L 25 23 35   ALT 10 - 44 U/L 17 22 45(H)   ANION GAP 8 - 16 mmol/L 4(L) 5(L) 5(L)   EGFR IF AFRICAN AMERICAN >60 mL/min/1.73 m:2 >60.0 >60.0 >60.0   EGFR IF NON-AFRICAN AMERICAN >60 mL/min/1.73 m:2 >60.0 >60.0 >60.0     Pulmonary Function Tests 9/5/2017 8/16/2017 6/14/2017 4/26/2017 2/6/2017   FVC 2.11 2.03 2.26 1.82 1.78   FEV1 1.8 1.2 1.03 1.16 1   TLC (liters) - - - - 3.01   DLCO (ml/mmHg sec) - 14.3 - - 11.9    FVC% 45 44 49 38 41   FEV1% 48 32 1.43 30 1.05   FEF 25-75 1.86 0.58 0.76 0.58 0.46   FEF 25-75% 49 15 20 15 13   TLC% - - - - 47   RV - - - - 1.18   RV% - - - - 54   DLCO% - 51 - - 44       Imaging:  Results for orders placed during the hospital encounter of 09/05/17   X-Ray Chest PA And Lateral    Narrative PA and lateral views of the chest were obtained.  Comparison -- 9/1/17. Postoperative changes from lung transplant are again identified. Right PICC remains with tip in the SVC. The heart size is upper normal. Mediastinal contour is unremarkable. The lungs are satisfactorily expanded . Both lower lobes, left more than right, show bands of opacity likely representing atelectasis. Some type of airspace consolidation is also possible. Continued followup is recommended. Small right pleural effusion may be present. No new finding has developed.    Impression  Status post lung transplant. Basilar lung atelectasis without significant interval change.      Electronically signed by: CATHY ACEVEDO MD  Date:     09/05/17  Time:    09:03        Assessment:  1. Encounter following organ transplant    2. Lung transplanted    3. Immunosuppression    4. Prophylactic antibiotic    5. Vocal cord dysfunction    6. Edema of both legs    7. Urinary hesitancy      Plan:   1. Will continue to monitor his pulmonary functions on a weekly basis for now. I explained to him and his family that today's PFT is not reflective of what his baseline function will be and that it is expected to improve over time. Oxygenation and CXR are stable.     2. Continue tacrolimus, mycophenolate, and prednisone. Adjust tacrolimus as needed.    3. Continue Bactrim, valganciclovir, and fluconazole.    4. He will follow up with ENT for his voice    5. Anti-embolic stockings prescribed.    6. I explained that it is not uncommon for him to have hesitancy because of the telles catheter. Will continue to observe and if it continues to be a problem we can  prescribe tamsulosin.    7. RTC in 1 week

## 2017-09-05 NOTE — PROGRESS NOTES
Patient currently receiving home IV vancomycin therapy.  Vancomycin trough added to lab work today as ordered for follow up by Dr. Wolfe.

## 2017-09-06 ENCOUNTER — TELEPHONE (OUTPATIENT)
Dept: TRANSPLANT | Facility: CLINIC | Age: 57
End: 2017-09-06

## 2017-09-06 ENCOUNTER — PATIENT MESSAGE (OUTPATIENT)
Dept: TRANSPLANT | Facility: CLINIC | Age: 57
End: 2017-09-06

## 2017-09-06 NOTE — TELEPHONE ENCOUNTER
----- Message from Star Pollard sent at 9/6/2017 10:32 AM CDT -----  Contact: Janeen- [employer]  Would like to confirm that patient IV antibiotic will be ending on 9-10-17?    And will not be extended.    Call [phone]

## 2017-09-06 NOTE — TELEPHONE ENCOUNTER
Returned Janeen's call from [facility] regarding IV antibiotics.  Janeen asked if IV will be extended past the current end date of 9/10/17.  Informed her that we do not have an answer for that at this time.  Janeen will call to follow up on 9/11/17.

## 2017-09-06 NOTE — TELEPHONE ENCOUNTER
----- Message from Star Pollard sent at 9/6/2017  1:05 PM CDT -----  Contact: Home health- [facility]  Need to speak with someone regarding patient home health. Would like to know if MD will oversee patient home health services?    Call [phone]

## 2017-09-07 LAB — IMMUNKNOW (STIMULATED): 124 NG/ML

## 2017-09-08 NOTE — PROGRESS NOTES
Patient in clinic today for his first outpatient post lung transplant follow up visit. Arrived ambulatory accompanied by his  Mother and daughter. In good spirits and verbalized that he has had no difficulty with the outpatient process, thus far.   Instructed him  to always arrive between 08:00 - 08:30 for lab work and remember not to take am Prograf dose before blood has been drawn. Patient reported a 0 on a 1-10 pain scale for incisional pain (sternal incision) just some discomfort when moving- verified that he is following strict sternal precaution guidelines. Reviewed contact numbers for reaching us during and after office hours - stressed importance of early reporting and plan to discuss anything that is, or may seem like, an issue before the end of the office day. For urgent issues that develop or worsen after hours, stressed importance of contacting Lung Transplant MD on call; reviewed importance of requesting med refills - clarified that he, or a family member, is responsible for calling the pharmacy for refills which should be done when down to no less than 5-7 days left of a medication - reminded him that meds may run out at different rates due to dose changes so he needs to be on top of when each one will run out; if there are no remaining refills, call the pharmacy anyway, they will request the refill auth. Do not wait until Fridays to request refills, especially if you know there are no more automatic refills remaining - refills need to be handled during office hours.   Patient verbalized his understanding of all.

## 2017-09-10 ENCOUNTER — PATIENT MESSAGE (OUTPATIENT)
Dept: TRANSPLANT | Facility: CLINIC | Age: 57
End: 2017-09-10

## 2017-09-11 ENCOUNTER — TELEPHONE (OUTPATIENT)
Dept: TRANSPLANT | Facility: CLINIC | Age: 57
End: 2017-09-11

## 2017-09-11 ENCOUNTER — HOSPITAL ENCOUNTER (OUTPATIENT)
Dept: PULMONOLOGY | Facility: CLINIC | Age: 57
Discharge: HOME | End: 2017-09-11
Payer: COMMERCIAL

## 2017-09-11 ENCOUNTER — HOSPITAL ENCOUNTER (OUTPATIENT)
Dept: RADIOLOGY | Facility: HOSPITAL | Age: 57
Discharge: HOME | End: 2017-09-11
Attending: INTERNAL MEDICINE
Payer: COMMERCIAL

## 2017-09-11 ENCOUNTER — LAB VISIT (OUTPATIENT)
Dept: LAB | Facility: HOSPITAL | Age: 57
End: 2017-09-11
Attending: INTERNAL MEDICINE
Payer: COMMERCIAL

## 2017-09-11 ENCOUNTER — OFFICE VISIT (OUTPATIENT)
Dept: TRANSPLANT | Facility: CLINIC | Age: 57
End: 2017-09-11
Payer: COMMERCIAL

## 2017-09-11 VITALS
HEIGHT: 70 IN | OXYGEN SATURATION: 99 % | TEMPERATURE: 98 F | RESPIRATION RATE: 20 BRPM | BODY MASS INDEX: 31.07 KG/M2 | DIASTOLIC BLOOD PRESSURE: 94 MMHG | HEART RATE: 100 BPM | SYSTOLIC BLOOD PRESSURE: 173 MMHG | WEIGHT: 217 LBS

## 2017-09-11 DIAGNOSIS — Z94.2 LUNG REPLACED BY TRANSPLANT: ICD-10-CM

## 2017-09-11 DIAGNOSIS — Z48.298 ENCOUNTER FOLLOWING ORGAN TRANSPLANT: Primary | ICD-10-CM

## 2017-09-11 DIAGNOSIS — Z79.2 PROPHYLACTIC ANTIBIOTIC: ICD-10-CM

## 2017-09-11 DIAGNOSIS — J38.3 VOCAL CORD DYSFUNCTION: ICD-10-CM

## 2017-09-11 DIAGNOSIS — E83.42 HYPOMAGNESEMIA: ICD-10-CM

## 2017-09-11 DIAGNOSIS — Z94.2 LUNG TRANSPLANTED: ICD-10-CM

## 2017-09-11 DIAGNOSIS — D84.9 IMMUNOSUPPRESSION: ICD-10-CM

## 2017-09-11 DIAGNOSIS — R60.0 EDEMA OF BOTH LEGS: ICD-10-CM

## 2017-09-11 LAB
ALBUMIN SERPL BCP-MCNC: 3 G/DL
ALP SERPL-CCNC: 105 U/L
ALT SERPL W/O P-5'-P-CCNC: 33 U/L
ANION GAP SERPL CALC-SCNC: 6 MMOL/L
AST SERPL-CCNC: 16 U/L
BASOPHILS # BLD AUTO: 0.13 K/UL
BASOPHILS NFR BLD: 1.3 %
BILIRUB SERPL-MCNC: 0.6 MG/DL
BUN SERPL-MCNC: 23 MG/DL
CALCIUM SERPL-MCNC: 9.2 MG/DL
CHLORIDE SERPL-SCNC: 101 MMOL/L
CO2 SERPL-SCNC: 32 MMOL/L
CREAT SERPL-MCNC: 1 MG/DL
DIFFERENTIAL METHOD: ABNORMAL
EOSINOPHIL # BLD AUTO: 0.1 K/UL
EOSINOPHIL NFR BLD: 0.8 %
ERYTHROCYTE [DISTWIDTH] IN BLOOD BY AUTOMATED COUNT: 14.1 %
EST. GFR  (AFRICAN AMERICAN): >60 ML/MIN/1.73 M^2
EST. GFR  (NON AFRICAN AMERICAN): >60 ML/MIN/1.73 M^2
GLUCOSE SERPL-MCNC: 120 MG/DL
HCT VFR BLD AUTO: 31 %
HGB BLD-MCNC: 10.1 G/DL
LYMPHOCYTES # BLD AUTO: 1 K/UL
LYMPHOCYTES NFR BLD: 9.9 %
MAGNESIUM SERPL-MCNC: 1.1 MG/DL
MCH RBC QN AUTO: 30 PG
MCHC RBC AUTO-ENTMCNC: 32.6 G/DL
MCV RBC AUTO: 92 FL
MONOCYTES # BLD AUTO: 0.5 K/UL
MONOCYTES NFR BLD: 4.8 %
NEUTROPHILS # BLD AUTO: 8.5 K/UL
NEUTROPHILS NFR BLD: 82.8 %
PLATELET # BLD AUTO: 286 K/UL
PMV BLD AUTO: 8 FL
POTASSIUM SERPL-SCNC: 4.5 MMOL/L
PRE FEV1 FVC: 86
PRE FEV1: 1.97
PRE FVC: 2.29
PREDICTED FEV1 FVC: 81
PREDICTED FEV1: 3.74
PREDICTED FVC: 4.6
PROT SERPL-MCNC: 6 G/DL
RBC # BLD AUTO: 3.37 M/UL
SODIUM SERPL-SCNC: 139 MMOL/L
TACROLIMUS BLD-MCNC: 12.9 NG/ML
WBC # BLD AUTO: 10.26 K/UL

## 2017-09-11 PROCEDURE — 99214 OFFICE O/P EST MOD 30 MIN: CPT | Mod: 25,S$GLB,, | Performed by: INTERNAL MEDICINE

## 2017-09-11 PROCEDURE — 80197 ASSAY OF TACROLIMUS: CPT

## 2017-09-11 PROCEDURE — 83735 ASSAY OF MAGNESIUM: CPT

## 2017-09-11 PROCEDURE — 99999 PR PBB SHADOW E&M-EST. PATIENT-LVL III: CPT | Mod: PBBFAC,,, | Performed by: INTERNAL MEDICINE

## 2017-09-11 PROCEDURE — 71020 XR CHEST PA AND LATERAL: CPT | Mod: TC

## 2017-09-11 PROCEDURE — 80053 COMPREHEN METABOLIC PANEL: CPT

## 2017-09-11 PROCEDURE — 94010 BREATHING CAPACITY TEST: CPT | Mod: S$GLB,,, | Performed by: INTERNAL MEDICINE

## 2017-09-11 PROCEDURE — 36415 COLL VENOUS BLD VENIPUNCTURE: CPT

## 2017-09-11 PROCEDURE — 3008F BODY MASS INDEX DOCD: CPT | Mod: S$GLB,,, | Performed by: INTERNAL MEDICINE

## 2017-09-11 PROCEDURE — 71020 XR CHEST PA AND LATERAL: CPT | Mod: 26,,, | Performed by: RADIOLOGY

## 2017-09-11 PROCEDURE — 85025 COMPLETE CBC W/AUTO DIFF WBC: CPT

## 2017-09-11 RX ORDER — FUROSEMIDE 40 MG/1
40 TABLET ORAL DAILY
Qty: 30 TABLET | Refills: 11 | Status: ON HOLD | OUTPATIENT
Start: 2017-09-11 | End: 2018-03-26

## 2017-09-11 NOTE — TELEPHONE ENCOUNTER
----- Message from Ivy Richard sent at 9/11/2017 12:06 PM CDT -----  Contact: Lillian with [employer]  Pts antibiotic therapy is due to end today, would like to know if it going to be extend?     Contact # [phone]  Thanks

## 2017-09-11 NOTE — TELEPHONE ENCOUNTER
Returned call to [facility] to confirm that patient has completed IV antibiotic therapy. Received written orders from Dr. Wolfe to remove PICC line through [facility]. [facility].

## 2017-09-11 NOTE — PROGRESS NOTES
LUNG TRANSPLANT RECIPIENT FOLLOW-UP    Reason for Visit: Follow-up after lung transplantation.                                                                                                         Date of Transplant: 8/22/17                                                                               Reason for Transplant: Sarcoidosis with pulmonary hypertension                                                                               Type of Transplant: bilateral sequential lung                                                                               CMV Status: D+ / R-                                                                               Major Complications:   1. Left vocal cord dysfunction                                                                               History of Present Illness: Martin Hendrix Jr. is a 56 y.o. year old male with a transplant history as outlined above who presents today for his routine visit after bilateral lung transplant. He says that he feels well. He denies shortness of breath or cough.  No problems with appetite. No issues with bowel movements. Tolerating his medications well. He is still having swelling of his feet.    Review of Systems   Constitutional: Negative for chills, diaphoresis, fever, malaise/fatigue and weight loss.   HENT: Negative for congestion, ear discharge, ear pain, hearing loss, nosebleeds and sore throat.         Hoarse   Eyes: Negative for blurred vision, double vision and photophobia.   Respiratory: Negative for cough, hemoptysis, sputum production, shortness of breath and wheezing.    Cardiovascular: Positive for chest pain and leg swelling. Negative for palpitations, orthopnea, claudication and PND.   Gastrointestinal: Negative for abdominal pain, blood in stool, constipation, diarrhea, heartburn, melena, nausea and vomiting.   Genitourinary: Negative for dysuria, flank pain, frequency, hematuria and urgency.   Musculoskeletal:  "Positive for myalgias. Negative for back pain, falls, joint pain and neck pain.   Skin: Negative for itching and rash.   Neurological: Negative for dizziness, tremors, sensory change, loss of consciousness, weakness and headaches.   Endo/Heme/Allergies: Does not bruise/bleed easily.   Psychiatric/Behavioral: Negative for depression, hallucinations and memory loss. The patient is not nervous/anxious and does not have insomnia.      BP (!) 173/94 (BP Location: Left arm, Patient Position: Sitting, BP Method: Large (Automatic))   Pulse 100   Temp 97.9 °F (36.6 °C) (Oral)   Resp 20   Ht 5' 10" (1.778 m)   Wt 98.4 kg (217 lb)   SpO2 99%   BMI 31.14 kg/m²     Physical Exam   Constitutional: He is oriented to person, place, and time and well-developed, well-nourished, and in no distress. No distress.   HENT:   Head: Normocephalic.   Nose: Nose normal.   Mouth/Throat: Oropharynx is clear and moist. No oropharyngeal exudate.   Eyes: Conjunctivae and EOM are normal. Pupils are equal, round, and reactive to light. No scleral icterus.   Neck: Normal range of motion. Neck supple. No JVD present. No tracheal deviation present. No thyromegaly present.   Cardiovascular: Normal rate, regular rhythm and intact distal pulses.  Exam reveals no gallop and no friction rub.    No murmur heard.  Pulmonary/Chest: Effort normal. No stridor. No respiratory distress. He has no wheezes. He has no rales. He exhibits no tenderness.   Abdominal: Soft. Bowel sounds are normal. He exhibits no distension and no mass. There is no tenderness. There is no rebound and no guarding.   Musculoskeletal: Normal range of motion. He exhibits edema (+2).   Lymphadenopathy:     He has no cervical adenopathy.   Neurological: He is alert and oriented to person, place, and time. No cranial nerve deficit. Gait normal. Coordination normal.   Skin: Skin is warm and dry. No rash noted. He is not diaphoretic. No erythema. No pallor.   Psychiatric: Mood and affect " normal.     Labs:  cbc, cmp, tacrolimus Latest Ref Rng & Units 9/1/2017 9/5/2017 9/11/2017   TACROLIMUS LVL 5.0 - 15.0 ng/mL 7.9 13.2 12.9   WHITE BLOOD CELL COUNT 3.90 - 12.70 K/uL 12.03 17.20(H) 10.26   RBC 4.60 - 6.20 M/uL 3.32(L) 3.37(L) 3.37(L)   HEMOGLOBIN 14.0 - 18.0 g/dL 10.1(L) 10.3(L) 10.1(L)   HEMATOCRIT 40.0 - 54.0 % 29.5(L) 31.1(L) 31.0(L)   MCV 82 - 98 fL 89 92 92   MCH 27.0 - 31.0 pg 30.4 30.6 30.0   MCHC 32.0 - 36.0 g/dL 34.2 33.1 32.6   RDW 11.5 - 14.5 % 14.0 14.0 14.1   PLATELETS 150 - 350 K/uL 396(H) 396(H) 286   MPV 9.2 - 12.9 fL 8.5(L) 8.1(L) 8.0(L)   GRAN # 1.8 - 7.7 K/uL 9.5(H) 14.8(H) 8.5(H)   LYMPH # 1.0 - 4.8 K/uL 1.1 1.1 1.0   MONO # 0.3 - 1.0 K/uL 0.9 1.0 0.5   EOSINOPHIL% 0.0 - 8.0 % 3.2 0.7 0.8   BASOPHIL% 0.0 - 1.9 % 0.1 0.2 1.3   DIFFERENTIAL METHOD - Automated Automated Automated   SODIUM 136 - 145 mmol/L 131(L) 135(L) 139   POTASSIUM 3.5 - 5.1 mmol/L 5.0 4.7 4.5   CHLORIDE 95 - 110 mmol/L 96 98 101   CO2 23 - 29 mmol/L 30(H) 32(H) 32(H)   GLUCOSE 70 - 110 mg/dL 126(H) 127(H) 120(H)   BUN BLD 6 - 20 mg/dL 34(H) 22(H) 23(H)   CREATININE 0.5 - 1.4 mg/dL 1.1 1.2 1.0   CALCIUM 8.7 - 10.5 mg/dL 8.6(L) 9.3 9.2   PROTEIN TOTAL 6.0 - 8.4 g/dL 5.4(L) 6.1 6.0   ALBUMIN 3.5 - 5.2 g/dL 2.3(L) 2.8(L) 3.0(L)   BILIRUBIN TOTAL 0.1 - 1.0 mg/dL 0.5 0.5 0.6   ALK PHOS 55 - 135 U/L 66 89 105   AST 10 - 40 U/L 23 35 16   ALT 10 - 44 U/L 22 45(H) 33   ANION GAP 8 - 16 mmol/L 5(L) 5(L) 6(L)   EGFR IF AFRICAN AMERICAN >60 mL/min/1.73 m:2 >60.0 >60.0 >60.0   EGFR IF NON-AFRICAN AMERICAN >60 mL/min/1.73 m:2 >60.0 >60.0 >60.0     Pulmonary Function Tests 9/11/2017 9/5/2017 8/16/2017 6/14/2017 4/26/2017 2/6/2017   FVC 2.29 2.11 2.03 2.26 1.82 1.78   FEV1 1.97 1.8 1.2 1.03 1.16 1   TLC (liters) - - - - - 3.01   DLCO (ml/mmHg sec) - - 14.3 - - 11.9   FVC% 49 45 44 49 38 41   FEV1% 53 48 32 1.43 30 1.05   FEF 25-75 2.26 1.86 0.58 0.76 0.58 0.46   FEF 25-75% 59 49 15 20 15 13   TLC% - - - - - 47   RV - - - - -  1.18   RV% - - - - - 54   DLCO% - - 51 - - 44       Imaging:  Results for orders placed during the hospital encounter of 09/11/17   X-Ray Chest PA And Lateral    Narrative 2 views of the chest were obtained, with PA and lateral projections submitted.  Comparison is made to 9/5/17.    Postoperative changes again noted in the thorax.  Heart is not significantly enlarged, and the cardiomediastinal silhouette is unchanged since the examination referenced above.  Pulmonary vascularity is normal.  Lung zones are stable, with linear opacity consistent with subsegmental atelectasis in the left lower lung zone again seen but with no new areas of airspace consolidation or volume loss having developed.  Pleural-based opacity along both lateral thoracic walls is again seen which may represent pleural scarring, though a small amount of pleural fluid cannot be excluded.  No pneumothorax.  Upper abdominal surgical clips again incidentally noted.    Impression  No significant detrimental interval change in the appearance of the chest since 9/5/17 is appreciated.      Electronically signed by: Sukhi Good MD  Date:     09/11/17  Time:    08:51          Assessment:  1. Encounter following organ transplant    2. Lung transplanted    3. Immunosuppression    4. Prophylactic antibiotic    5. Vocal cord dysfunction    6. Edema of both legs      Plan:   1. Will continue to monitor his pulmonary functions on a weekly basis for now.They are improving as expected. Oxygenation and CXR are stable.     2. Continue tacrolimus, mycophenolate, and prednisone. Adjust tacrolimus as needed.    3. Continue Bactrim, valganciclovir, and fluconazole.    4. He will follow up with ENT for his voice    5. Furosemide 40 mg daily as needed for his edema    6. RTC in 1 week

## 2017-09-15 ENCOUNTER — PATIENT MESSAGE (OUTPATIENT)
Dept: TRANSPLANT | Facility: CLINIC | Age: 57
End: 2017-09-15

## 2017-09-18 ENCOUNTER — OFFICE VISIT (OUTPATIENT)
Dept: OTOLARYNGOLOGY | Facility: CLINIC | Age: 57
End: 2017-09-18
Payer: COMMERCIAL

## 2017-09-18 VITALS
BODY MASS INDEX: 30.18 KG/M2 | TEMPERATURE: 99 F | DIASTOLIC BLOOD PRESSURE: 91 MMHG | WEIGHT: 210.31 LBS | SYSTOLIC BLOOD PRESSURE: 151 MMHG | HEART RATE: 91 BPM

## 2017-09-18 DIAGNOSIS — R49.9 VOICE DISTURBANCE: ICD-10-CM

## 2017-09-18 DIAGNOSIS — J38.01 UNILATERAL COMPLETE VOCAL FOLD PARALYSIS: Primary | ICD-10-CM

## 2017-09-18 PROCEDURE — 99999 PR PBB SHADOW E&M-EST. PATIENT-LVL III: CPT | Mod: PBBFAC,,, | Performed by: OTOLARYNGOLOGY

## 2017-09-18 PROCEDURE — 31579 LARYNGOSCOPY TELESCOPIC: CPT | Mod: S$GLB,,, | Performed by: OTOLARYNGOLOGY

## 2017-09-18 PROCEDURE — 99214 OFFICE O/P EST MOD 30 MIN: CPT | Mod: 25,S$GLB,, | Performed by: OTOLARYNGOLOGY

## 2017-09-18 PROCEDURE — 3008F BODY MASS INDEX DOCD: CPT | Mod: S$GLB,,, | Performed by: OTOLARYNGOLOGY

## 2017-09-18 NOTE — PROGRESS NOTES
[facility]  Department of Otorhinolaryngology and Communication Sciences    Martin Hendrix Jr. is a 56 y.o. male who presents to the [facility] for consultation at the kind request of Dr. Merino for further management of vocal fold paralysis. He lives in [facility] and is presently on disability.    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 Indication was advanced sarcoidosis. Onset was sudden. Duration is 1 month. Time course is constant. Symptoms improved initially but have now stabilized. He denies any exacerbating factors. He denies any alleviating factors. Drinking hot tea makes his throat feel better.  He denies any associated symptoms.     He underwent MBSS while inpatient and is cleared for a regular diet. He denies any dysphagia or coughing with swallowing.    Voice Handicap Index-10 (VHI-10) score is 29.   Reflux Symptom Index (RSI) score is 7.   Eating Assessment Tool-10 (EAT-10) score is 0.   Dyspnea Index (DI) score is 0.  Cough Severity Index (CSI) score is 0.    Past Medical History  He has a past medical history of Atrial fibrillation; On home oxygen therapy; KRISTYN on CPAP; Osteopenia; Pulmonary hypertension; Sarcoidosis; and Shortness of breath.    Past Surgical History  He has a past surgical history that includes Abdominal surgery; Cholecystectomy; Lung biopsy; Chest tube insertion; and Bronchoscopy.    Family History  His family history includes Diabetes in his brother and father; Hypertension in his mother; Kidney disease in his sister.    Social History  He reports that he has never smoked. He has never used smokeless tobacco. He reports that he does not drink alcohol or use drugs.    Allergies  He has No Known Allergies.    Medications  He has a current medication list which includes the following prescription(s): accu-chek megan plus meter, accu-chek megan plus test strp, accu-chek softclix lancets, aspirin, calcium-vitamin  d3, famotidine, fluconazole, folic acid, furosemide, guaifenesin, magnesium oxide, multivitamin, mycophenolate, oxycodone-acetaminophen, prednisone, sulfamethoxazole-trimethoprim 800-160mg, tacrolimus, and valganciclovir.    Review of Systems   Constitutional: Negative for fever.   HENT: Negative for sore throat.    Eyes: Negative for visual disturbance.   Respiratory: Negative for wheezing.    Cardiovascular: Negative for chest pain.   Gastrointestinal: Negative for nausea.   Musculoskeletal: Negative for arthralgias.   Skin: Negative for rash.   Neurological: Negative for tremors.   Hematological: Does not bruise/bleed easily.   Psychiatric/Behavioral: The patient is not nervous/anxious.           Objective:     BP (!) 151/91   Pulse 91   Temp 98.6 °F (37 °C)   Wt 95.4 kg (210 lb 5.1 oz)   BMI 30.18 kg/m²    Physical Exam    Constitutional: comfortable, well dressed  Psychiatric: appropriate affect  Respiratory: comfortably breathing, symmetric chest rise, no stridor  Voice: moderate to severe breathiness with asthenia; impaired projection  Cardiovascular: upper extremities non-edematous  Lymphatic: no cervical lymphadenopathy  Neurologic: alert and oriented to time, place, person, and situation; cranial nerves 3-12 grossly intact  Head: normocephalic  Eyes: conjunctivae and sclerae clear  Ears: normal pinnae, normal external auditory canals, tympanic membranes intact  Nose: mucosa pink and noncongested, no masses, no mucopurulence, no polyps  Oral cavity / oropharynx: no mucosal lesions  Neck: soft, full range of motion, laryngotracheal complex palpable with appropriate landmarks, larynx elevates on swallowing  Indirect laryngoscopy: limited due to gag    Procedure  Flexible Laryngeal Videostroboscopy (47461): Laryngeal videostroboscopy is indicated to assess laryngeal vibratory biomechanics and vocal fold oscillation, which cannot be assessed with a plain light examination. This was carried out transnasally  with a distal chip videoendoscope. After verbal consent was obtained, the patient was positioned and the nose was topically decongested with 1% phenylephrine and topically anesthetized with 4% lidocaine. The endoscope was passed through the most patent nasal cavity and positioned to image the nasopharynx, larynx, and hypopharynx in detail. The following featured were examined: nasopharyngeal, laryngeal, hypopharyngeal masses; velopharyngeal strength, closure, and symmetry of motion; vocal fold range and symmetry of motion; laryngeal mucosal edema, erythema, inflammation, and hydration; salivary pooling; and gross laryngeal sensation. During phonation, the vocal folds were assesses for glottal closure; mucosal wave; vocal fold lesions; vibratory periodicity, amplitude, and phase symmetry; and vertical height match. The equipment was removed. The patient tolerated the procedure well without complication. All findings were normal except:  - left vocal fold immobile, paramedian, bowed  - glottal insufficiency across all frequencies; primarily aperiodic vibration  - negligible posterior gap; minimal vertical height mismatch  - compensatory supraglottic hyperfunction        Assessment:     Martin Hendrix Jr. is a 56 y.o. male with left vocal fold paralysis following lung transplant 8/2017.       Plan:        I had a discussion with the patient regarding his condition and the further workup and management options.      I educated the patient on the prognosis of newly identified vocal fold immobility and emphasized that it may take up to a year for the nerve to demonstrate its full recovery potential. In the meantime, treatment options include the following: a) no treatment and continued observation; b) voice therapy; c) a vocal fold injection augmentation procedure. He desires procedural intervention. The risks and benefits of vocal fold injection augmentation were discussed with the patient. Risks included but were not  limited to bleeding, infection, allergic reaction to the injectable, scarring, worsening of voice, early resorption, need for additional procedures, pain, epistaxis, and airway edema which could necessitate need for intubation or tracheostomy. We informed the patient that while in the past this procedure was performed mainly in the operating room under general anesthesia, we now primarily perform this procedure in our procedure suite without the need for general anesthesia.    All questions were answered and the patient would like to proceed with an office-based left vocal fold injection augmentation procedure. We will plan to use hyaluronic acid (Restylane-L). Informed consent was obtained. We will arrange this in the coming weeks.    This visit was 25 minutes in duration, with over 50% of the time spent in direct face-to-face counseling and coordination of care regarding the issues outlined above.    El Veliz M.D.  [facility]  Department of Otorhinolaryngology and Communication Sciences

## 2017-09-18 NOTE — PATIENT INSTRUCTIONS
VOCAL CORD PARALYSIS & PARESIS     What is vocal fold paralysis/paresis?     Vocal fold paralysis is a condition in which one or more nerves to the vocal folds work poorly, and results in the vocal folds moving weakly or asymmetrically. In most cases, vocal fold closure is reduced, which results in a weak or breathy voice. There may also be problems with the vocal folds elongating, so it becomes difficult to produce high pitches. In other more rare cases, the vocal folds may not be able to move apart, causing breathing difficulty. Paresis is a similar condition, but less severe.     Vocal fold paralysis and vocal fold paresis have several causes. These include viruses, certain types of surgeries, heart problems, tumors, or other diseases of the brain.     How is it treated?     First, it is best to determine the cause of the paralysis/paresis. Based on the cause, special tests may be conducted to determine if the paralysis or paresis is new or old, and if it is getting worse or better. Clearly, if breathing is an issue, securing the airway surgically is the first step. Otherwise, depending on the results of the tests, it is common to either adopt a wait and see approach or to begin voice therapy. In some cases, microsurgery is used to mechanically reposition the affected vocal fold so that it gets good closure with the good one, thereby improving voice and swallowing.         VOCAL FOLD INJECTION AUGMENTATION     Description   If the vocal folds (vocal cords) cannot close completely, you may experience voice problems: roughness, breathiness, inability to get loud, increased vocal effort, increased vocal fatigue. Some patients may also experience aspiration (coughing or choking with swallowing). Vocal fold injection augmentation plumps up the vocal fold and/or repositions it in the midline in order to help the vocal folds close completely while speaking or swallowing. Following the procedure, most patients  experience a louder, stronger, clearer voice. The procedure also helps some patients protect against aspiration, although the swallowing improvement is not as dramatic as the voice improvement. We use the following materials for the procedure: hyaluronic acid (Restylane); carboxymethylcellulose (Radiesse Voice Gel); and calcium hydroxyapatite (Radiesse Voice). For most patients, the injection is performed with a small needle passed through the skin of the neck. However, in some patients we perform the injection with a device passed through the mouth. In either case, the injection is guided by the visualization provided by a scope passed through the nose.     What to expect during the procedure   We perform the injection in our office under local (topical) anesthesia. You are awake the whole time, and the entire procedure lasts about 15 minutes. Our primary focus is your safety and comfort. We usually make the larynx numb by spraying the throat and/or dripping anesthetic on the larynx. At this time, you may cough or gag, or you may have the sensation that you spilled some water down the wrong pipe. These are temporary sensations that allow us to get you numb. The numbing process takes about 2 minutes. We will not proceed until we know you will be as comfortable as possible. A small needle is passed either through the skin of the neck or via a long instrument through the mouth to perform the injection. During the injection, you may experience mild discomfort in the throat. You may feel an unusual sensation in the ear because the larynx and the ear share the same sensory nerve. In rare cases in which a patient does not tolerate the procedure, it may be performed in the operating room, with the patient completely asleep under general anesthesia.     What to expect afterwards   Most patients note a stronger, less effortful voice immediately after the injection. Sometimes the voice is tight or pressed voice is noted right  after the injection. The voice usually has good days and bad days and gradually improves until you reach your new baseline voice over the first 1-2 weeks. Voice therapy may be a necessary part of your treatment plan to optimize your vocal outcome. None of the materials we inject are permanent. As the material dissolves, you may experience a gradual worsening of voice quality over the course of several months. At this point we may consider repeating the injection. You may be a candidate for a permanent fix, which involves an open surgery in the neck performed in the operating room.     Instructions: before the procedure   1. Do not take aspirin-containing products or other medications that can thin the blood (such as ibuprofen, Advil, Motrin, Aleve, Plavix) for 7 days prior to the injection. If you take Coumadin (warfarin), you may need to stop using this a few days prior to the injection. If you are on blood thinning (anti-platelet or anti-coagulant) medication and it is not clear what you should do, please clarify this with your physician.   2. On the day of your procedure, please make sure you take your other regular morning medications.   3. On the day of your procedure, it is OK to eat and drink as you would normally, up until 3 hours before to your appointment time. During that time frame, we ask that you restrict yourself only to clear liquids. A clear liquid is anything you can see through (water, ginger ale, apple juice).     Instructions: after the procedure   1. Please refrain from eating or drinking for 1 hour following the procedure. This allows time for the numbing medication to wear off.   2. Most patients experience very little pain. If necessary, most patients are able to keep comfortable with plain Tylenol (acetaminophen) and/or other non-steroidal anti-inflammatory medications such as ibuprofen (Advil, Motrin). Please follow package instructions if considering taking these medications.   3. In most  instances, it is OK to use your voice immediately after the procedure. However, for the first week, you should avoid speaking over heavy background noise or in a very loud voice. It is rare, but in some cases you will be asked to rest your voice for the first 24 hours.   4. Please call the [facility] Center at [phone] if   · You have a temperature above 101°F   · You develop Increasing throat pain not relieved by over-the-counter medications   · You have any other post-operative questions or concerns   5. Please go immediately to the nearest emergency room if you are experiencing   · Shortness of breath   · Difficulty breathing   · Difficulty swallowing   · Severe bleeding     FREQUENTLY ASKED QUESTIONS     Is this a Botox injection? No. Botox weakens the voice box muscles. Instead, with a vocal fold injection augmentation, we are injecting filler material to bulk up the vocal fold(s).     How do you decide which material to use for the injection? Our decision is based on the indication for the procedure, the position of the vocal fold, and other patient historical/anatomical factors. In some instances, the approval of your insurance company is an important factor.     How to you decide which technique to use (through the neck versus through the mouth)? Patient anatomy and the position of the vocal fold play an important role. Other patient factors such as gag reflex are also strongly considered.     Why do you perform this in your office instead of in the operating room? Performing the procedure in the office is safe and precise. In addition, performing the injection with the patient awake gives us direct visual and auditory feedback, which we do not get when the patient is asleep in the operating room. Furthermore, an office-based injection is much less time consuming, is more convenient for the patient, and does not involve the risks or the recovery time associated with general anesthesia. We can still do  this in the operating room; we save that setting for specific diagnoses or situations, as well as for the rare patient who is unable to tolerate the awake procedure.     Why did I have discomfort in my ear (during or after the injection)? This is an example of referred pain. The ear and the larynx share the same sensory nerve.     I got an injection 3 days ago. Why is my voice still hoarse? To optimize vocal outcome, we overinject a little bit. Additionally, there may be a mild amount of swelling. Finally, the muscles of the larynx need to adjust to the injected material. Most people will have good days and bad days at first. After 1-2 weeks, you should settle out to your new baseline voice.     How long does the injection last? Carboxymethylcellulose (Radiesse Voice Gel) lasts approximately 1-2 months. Hyaluronic acid (Restylane) lasts approximately 4 months. Calcium hydroxyapatite (Radiesse Voice) lasts up to 1 year; however its characteristics are such that only few patients are appropriate for using this material.     Is there a permanent injectable material? No.     Can the injection be repeated? Yes. There is no limit to the number of times an injection can be repeated. However, a permanent surgical fix is often an option to consider.

## 2017-09-19 ENCOUNTER — OFFICE VISIT (OUTPATIENT)
Dept: TRANSPLANT | Facility: CLINIC | Age: 57
End: 2017-09-19
Payer: COMMERCIAL

## 2017-09-19 ENCOUNTER — PATIENT MESSAGE (OUTPATIENT)
Dept: TRANSPLANT | Facility: CLINIC | Age: 57
End: 2017-09-19

## 2017-09-19 ENCOUNTER — HOSPITAL ENCOUNTER (OUTPATIENT)
Dept: RADIOLOGY | Facility: HOSPITAL | Age: 57
Discharge: HOME | End: 2017-09-19
Attending: INTERNAL MEDICINE
Payer: COMMERCIAL

## 2017-09-19 ENCOUNTER — LAB VISIT (OUTPATIENT)
Dept: LAB | Facility: HOSPITAL | Age: 57
End: 2017-09-19
Attending: INTERNAL MEDICINE
Payer: COMMERCIAL

## 2017-09-19 ENCOUNTER — HOSPITAL ENCOUNTER (OUTPATIENT)
Dept: PULMONOLOGY | Facility: CLINIC | Age: 57
Discharge: HOME | End: 2017-09-19
Payer: COMMERCIAL

## 2017-09-19 VITALS
WEIGHT: 207 LBS | TEMPERATURE: 98 F | OXYGEN SATURATION: 100 % | DIASTOLIC BLOOD PRESSURE: 101 MMHG | RESPIRATION RATE: 20 BRPM | SYSTOLIC BLOOD PRESSURE: 160 MMHG | BODY MASS INDEX: 29.63 KG/M2 | HEIGHT: 70 IN | HEART RATE: 89 BPM

## 2017-09-19 DIAGNOSIS — E83.42 HYPOMAGNESEMIA: ICD-10-CM

## 2017-09-19 DIAGNOSIS — Z94.2 LUNG REPLACED BY TRANSPLANT: Primary | ICD-10-CM

## 2017-09-19 DIAGNOSIS — Z48.298 ENCOUNTER FOLLOWING ORGAN TRANSPLANT: Primary | ICD-10-CM

## 2017-09-19 DIAGNOSIS — Z94.2 LUNG TRANSPLANTED: ICD-10-CM

## 2017-09-19 DIAGNOSIS — Z79.2 PROPHYLACTIC ANTIBIOTIC: ICD-10-CM

## 2017-09-19 DIAGNOSIS — Z94.2 LUNG REPLACED BY TRANSPLANT: ICD-10-CM

## 2017-09-19 DIAGNOSIS — R03.0 ELEVATED BLOOD-PRESSURE READING, WITHOUT DIAGNOSIS OF HYPERTENSION: ICD-10-CM

## 2017-09-19 DIAGNOSIS — D84.9 IMMUNOSUPPRESSION: ICD-10-CM

## 2017-09-19 DIAGNOSIS — J38.3 VOCAL CORD DYSFUNCTION: ICD-10-CM

## 2017-09-19 LAB
ALBUMIN SERPL BCP-MCNC: 3.4 G/DL
ALP SERPL-CCNC: 122 U/L
ALT SERPL W/O P-5'-P-CCNC: 25 U/L
ANION GAP SERPL CALC-SCNC: 8 MMOL/L
AST SERPL-CCNC: 13 U/L
BASOPHILS # BLD AUTO: 0.05 K/UL
BASOPHILS NFR BLD: 0.7 %
BILIRUB SERPL-MCNC: 0.5 MG/DL
BUN SERPL-MCNC: 30 MG/DL
CALCIUM SERPL-MCNC: 9.6 MG/DL
CHLORIDE SERPL-SCNC: 100 MMOL/L
CO2 SERPL-SCNC: 30 MMOL/L
CREAT SERPL-MCNC: 1.2 MG/DL
DIFFERENTIAL METHOD: ABNORMAL
EOSINOPHIL # BLD AUTO: 0 K/UL
EOSINOPHIL NFR BLD: 0.5 %
ERYTHROCYTE [DISTWIDTH] IN BLOOD BY AUTOMATED COUNT: 14 %
EST. GFR  (AFRICAN AMERICAN): >60 ML/MIN/1.73 M^2
EST. GFR  (NON AFRICAN AMERICAN): >60 ML/MIN/1.73 M^2
GLUCOSE SERPL-MCNC: 135 MG/DL
HCT VFR BLD AUTO: 33 %
HGB BLD-MCNC: 10.7 G/DL
LYMPHOCYTES # BLD AUTO: 1.2 K/UL
LYMPHOCYTES NFR BLD: 15.7 %
MAGNESIUM SERPL-MCNC: 1.4 MG/DL
MCH RBC QN AUTO: 29.9 PG
MCHC RBC AUTO-ENTMCNC: 32.4 G/DL
MCV RBC AUTO: 92 FL
MONOCYTES # BLD AUTO: 0.4 K/UL
MONOCYTES NFR BLD: 4.9 %
NEUTROPHILS # BLD AUTO: 5.9 K/UL
NEUTROPHILS NFR BLD: 77.5 %
PLATELET # BLD AUTO: 435 K/UL
PMV BLD AUTO: 8.2 FL
POTASSIUM SERPL-SCNC: 4.3 MMOL/L
PRE FEV1 FVC: 90
PRE FEV1: 2.13
PRE FVC: 2.36
PREDICTED FEV1 FVC: 81
PREDICTED FEV1: 3.74
PREDICTED FVC: 4.6
PROT SERPL-MCNC: 6.5 G/DL
RBC # BLD AUTO: 3.58 M/UL
SODIUM SERPL-SCNC: 138 MMOL/L
TACROLIMUS BLD-MCNC: 17.4 NG/ML
WBC # BLD AUTO: 7.62 K/UL

## 2017-09-19 PROCEDURE — 80197 ASSAY OF TACROLIMUS: CPT

## 2017-09-19 PROCEDURE — 99214 OFFICE O/P EST MOD 30 MIN: CPT | Mod: 25,S$GLB,, | Performed by: INTERNAL MEDICINE

## 2017-09-19 PROCEDURE — 99999 PR PBB SHADOW E&M-EST. PATIENT-LVL III: CPT | Mod: PBBFAC,,, | Performed by: INTERNAL MEDICINE

## 2017-09-19 PROCEDURE — 86833 HLA CLASS II HIGH DEFIN QUAL: CPT | Mod: 91,PO,TXP

## 2017-09-19 PROCEDURE — 83735 ASSAY OF MAGNESIUM: CPT

## 2017-09-19 PROCEDURE — 71020 XR CHEST PA AND LATERAL: CPT | Mod: 26,,, | Performed by: RADIOLOGY

## 2017-09-19 PROCEDURE — 94010 BREATHING CAPACITY TEST: CPT | Mod: S$GLB,,, | Performed by: INTERNAL MEDICINE

## 2017-09-19 PROCEDURE — 3008F BODY MASS INDEX DOCD: CPT | Mod: S$GLB,,, | Performed by: INTERNAL MEDICINE

## 2017-09-19 PROCEDURE — 80053 COMPREHEN METABOLIC PANEL: CPT | Mod: NTX

## 2017-09-19 PROCEDURE — 71020 XR CHEST PA AND LATERAL: CPT | Mod: TC

## 2017-09-19 PROCEDURE — 85025 COMPLETE CBC W/AUTO DIFF WBC: CPT

## 2017-09-19 PROCEDURE — 86832 HLA CLASS I HIGH DEFIN QUAL: CPT | Mod: PO,TXP

## 2017-09-19 PROCEDURE — 36415 COLL VENOUS BLD VENIPUNCTURE: CPT | Mod: NTX

## 2017-09-19 PROCEDURE — 86977 RBC SERUM PRETX INCUBJ/INHIB: CPT | Mod: 91,PO,TXP

## 2017-09-19 PROCEDURE — 86832 HLA CLASS I HIGH DEFIN QUAL: CPT | Mod: 91,PO,TXP

## 2017-09-19 RX ORDER — TACROLIMUS 0.5 MG/1
1 CAPSULE ORAL EVERY 12 HOURS
Qty: 120 CAPSULE | Refills: 11
Start: 2017-09-19 | End: 2017-09-25 | Stop reason: DRUGHIGH

## 2017-09-19 NOTE — PROGRESS NOTES
LUNG TRANSPLANT RECIPIENT FOLLOW-UP     Reason for Visit: Follow-up after lung transplantation.                                                                                                         Date of Transplant: 8/22/17                                                                               Reason for Transplant: Sarcoidosis with pulmonary hypertension                                                                               Type of Transplant: bilateral sequential lung                                                                               CMV Status: D+ / R-                                                                               Major Complications:   1. Left vocal cord dysfunction                                                                               History of Present Illness: Martin Hendrix Jr. is a 56 y.o. year old male with the above transplant history as above.  Patient was last seen one week ago.  He denies any fevers, chills, cough, sputum production, or wheezing except for continued hoarsness related to vocal cord paralysis.  He saw ENT yesterday, who plans on performing an injection next week.  He has also been taking lasxi 40 mg PRN for swelling of his lower extremities.  Weight is down from last visit but still endorses swelling of his lower extremities. His other concern is that his blood pressure seems to have increased since his transplant last month.  He has never been on antihypertensive medications in the past.  He has been compliant with his immunosuppresants and prophylactic antibitoics.     Review of Systems   Constitutional: Negative.    HENT: Negative.    Eyes: Negative.    Respiratory: Positive for shortness of breath.    Cardiovascular: Negative.    Gastrointestinal: Negative.    Genitourinary: Negative.    Musculoskeletal: Negative.    Skin: Negative.    Neurological: Negative.    Endo/Heme/Allergies: Negative.    Psychiatric/Behavioral: Negative.   "      BP (!) 160/101 (BP Location: Left arm, Patient Position: Sitting, BP Method: Large (Automatic))   Pulse 89   Temp 98 °F (36.7 °C) (Oral)   Resp 20   Ht 5' 10" (1.778 m)   Wt 93.9 kg (207 lb)   SpO2 100%   BMI 29.70 kg/m²       Physical Exam   Constitutional: He is oriented to person, place, and time and well-developed, well-nourished, and in no distress.   HENT:   Head: Normocephalic and atraumatic.   Eyes: Pupils are equal, round, and reactive to light.   Neck: Normal range of motion. Neck supple.   Cardiovascular: Normal rate, regular rhythm and normal heart sounds.    Pulmonary/Chest: Effort normal and breath sounds normal.   Abdominal: Soft. Bowel sounds are normal.   Musculoskeletal: He exhibits edema.   2+ edmea of lower extremities   Neurological: He is alert and oriented to person, place, and time.   Skin: Skin is warm.        Labs:  cbc, cmp, tacrolimus Latest Ref Rng & Units 9/5/2017 9/11/2017 9/19/2017   TACROLIMUS LVL 5.0 - 15.0 ng/mL 13.2 12.9 -   WHITE BLOOD CELL COUNT 3.90 - 12.70 K/uL 17.20(H) 10.26 7.62   RBC 4.60 - 6.20 M/uL 3.37(L) 3.37(L) 3.58(L)   HEMOGLOBIN 14.0 - 18.0 g/dL 10.3(L) 10.1(L) 10.7(L)   HEMATOCRIT 40.0 - 54.0 % 31.1(L) 31.0(L) 33.0(L)   MCV 82 - 98 fL 92 92 92   MCH 27.0 - 31.0 pg 30.6 30.0 29.9   MCHC 32.0 - 36.0 g/dL 33.1 32.6 32.4   RDW 11.5 - 14.5 % 14.0 14.1 14.0   PLATELETS 150 - 350 K/uL 396(H) 286 435(H)   MPV 9.2 - 12.9 fL 8.1(L) 8.0(L) 8.2(L)   GRAN # 1.8 - 7.7 K/uL 14.8(H) 8.5(H) 5.9   LYMPH # 1.0 - 4.8 K/uL 1.1 1.0 1.2   MONO # 0.3 - 1.0 K/uL 1.0 0.5 0.4   EOSINOPHIL% 0.0 - 8.0 % 0.7 0.8 0.5   BASOPHIL% 0.0 - 1.9 % 0.2 1.3 0.7   DIFFERENTIAL METHOD - Automated Automated Automated   SODIUM 136 - 145 mmol/L 135(L) 139 138   POTASSIUM 3.5 - 5.1 mmol/L 4.7 4.5 4.3   CHLORIDE 95 - 110 mmol/L 98 101 100   CO2 23 - 29 mmol/L 32(H) 32(H) 30(H)   GLUCOSE 70 - 110 mg/dL 127(H) 120(H) 135(H)   BUN BLD 6 - 20 mg/dL 22(H) 23(H) 30(H)   CREATININE 0.5 - 1.4 mg/dL 1.2 1.0 " 1.2   CALCIUM 8.7 - 10.5 mg/dL 9.3 9.2 9.6   PROTEIN TOTAL 6.0 - 8.4 g/dL 6.1 6.0 6.5   ALBUMIN 3.5 - 5.2 g/dL 2.8(L) 3.0(L) 3.4(L)   BILIRUBIN TOTAL 0.1 - 1.0 mg/dL 0.5 0.6 0.5   ALK PHOS 55 - 135 U/L 89 105 122   AST 10 - 40 U/L 35 16 13   ALT 10 - 44 U/L 45(H) 33 25   ANION GAP 8 - 16 mmol/L 5(L) 6(L) 8   EGFR IF AFRICAN AMERICAN >60 mL/min/1.73 m:2 >60.0 >60.0 >60.0   EGFR IF NON-AFRICAN AMERICAN >60 mL/min/1.73 m:2 >60.0 >60.0 >60.0     Pulmonary Function Tests 9/19/2017 9/11/2017 9/5/2017 8/16/2017 6/14/2017 4/26/2017 2/6/2017   FVC 2.36 2.29 2.11 2.03 2.26 1.82 1.78   FEV1 2.13 1.97 1.8 1.2 1.03 1.16 1   TLC (liters) - - - - - - 3.01   DLCO (ml/mmHg sec) - - - 14.3 - - 11.9   FVC% 51 49 45 44 49 38 41   FEV1% 57 53 48 32 1.43 30 1.05   FEF 25-75 2.85 2.26 1.86 0.58 0.76 0.58 0.46   FEF 25-75% 74 59 49 15 20 15 13   TLC% - - - - - - 47   RV - - - - - - 1.18   RV% - - - - - - 54   DLCO% - - - 51 - - 44       Imaging:  Results for orders placed during the hospital encounter of 09/19/17   X-Ray Chest PA And Lateral    Narrative 2 views    Postoperative changes identified in the thorax.  Mild cardiomegaly.  Subsegmental atelectatic changes noted the left lung base.  Slightly blunted costophrenic angles noted laterally.  The upper lung fields are clear.    Impression  See above      Electronically signed by: Sukhi Crooks MD  Date:     09/19/17  Time:    09:02        Assessment:  1. Encounter following organ transplant    2. Immunosuppression    3. Prophylactic antibiotic    4. Vocal cord dysfunction    5. Elevated blood-pressure reading, without diagnosis of hypertension    6. Lung transplanted      Plan:     1. PFTs improved from last week with FVC-2.36 and FEV1-2.13.  On room air and asymptomatic from a respiratory standpoint.  Will repeat PFTs in 1 week for interval assessmet     2. Continue tacrolimus, mycophenolate, and prednisone. Will adjust tacrolimus based on level today.     3. Continue Bactrim,  Valganciclovir, and Fluconazole.     4. Seen by ENT yesterday for VCD, who plan on proceeding with injection next week.     5. Elevated blood pressures most likely secondary to patient's tacrolimus and prednisone s/p transplant.  Will reassess next week before starting any antihypertensive medications.  In the interim, stressed importance of lifestyle modifications with low salt diet and exercise.  Patient to bring in diary of blood pressures over the week to his next visit.    RTC in 1 week    Brenton Nuno MD, MSc  Pulmonary/Critical Care Fellow    Attending Note:    I have seen and evaluated the patient with the fellow. Their note reflects the content of our discussion and my plan of care.      Darrick Wolfe MD  Pulmonary/Critical Care Medicine

## 2017-09-19 NOTE — TELEPHONE ENCOUNTER
Received written orders from Dr. Wolfe to decrease Prograf to 1 mg BID (from 1.5 mg BID)  Patient has been notified of the above dose adjustment.  We will recheck lab next week with pt's clinic visit.  Patient noted the changes on med card and verbalized understanding.

## 2017-09-20 ENCOUNTER — OFFICE VISIT (OUTPATIENT)
Dept: CARDIOTHORACIC SURGERY | Facility: CLINIC | Age: 57
End: 2017-09-20
Payer: COMMERCIAL

## 2017-09-20 VITALS
DIASTOLIC BLOOD PRESSURE: 107 MMHG | OXYGEN SATURATION: 97 % | HEIGHT: 71 IN | BODY MASS INDEX: 29.21 KG/M2 | SYSTOLIC BLOOD PRESSURE: 179 MMHG | TEMPERATURE: 98 F | WEIGHT: 208.63 LBS | HEART RATE: 97 BPM

## 2017-09-20 DIAGNOSIS — Z94.2 S/P LUNG TRANSPLANT: Primary | ICD-10-CM

## 2017-09-20 PROCEDURE — 99024 POSTOP FOLLOW-UP VISIT: CPT | Mod: S$GLB,,, | Performed by: THORACIC SURGERY (CARDIOTHORACIC VASCULAR SURGERY)

## 2017-09-20 PROCEDURE — 99999 PR PBB SHADOW E&M-EST. PATIENT-LVL III: CPT | Mod: PBBFAC,,, | Performed by: THORACIC SURGERY (CARDIOTHORACIC VASCULAR SURGERY)

## 2017-09-20 NOTE — PROGRESS NOTES
Patient seen and examined. Patient is progressively increasing activity. No significant complaints.     Sternum: stable, incision CDI  Chest xray: Acceptable post op chest  EKG: NSR     Assessment:  1)  TRANSPLANT-LUNG (Bilateral) with CPB   2)  Backbench preparation of donor lungs for implant      Plan:  Can begin driving as long as he has power steering  We will refer to Dr. Wolfe to assume care       No scheduled appointment, RTC prn

## 2017-09-25 DIAGNOSIS — Z94.2 LUNG REPLACED BY TRANSPLANT: Primary | ICD-10-CM

## 2017-09-25 RX ORDER — TACROLIMUS 1 MG/1
1 CAPSULE ORAL EVERY 12 HOURS
Qty: 60 CAPSULE | Refills: 11 | Status: SHIPPED | OUTPATIENT
Start: 2017-09-25 | End: 2017-09-27 | Stop reason: SDUPTHER

## 2017-09-25 NOTE — TELEPHONE ENCOUNTER
[facility] requests refill on Prograf.  Rx entered in epic ready for Dr. Wolfe to review and approve.

## 2017-09-26 ENCOUNTER — HOSPITAL ENCOUNTER (OUTPATIENT)
Dept: PULMONOLOGY | Facility: CLINIC | Age: 57
Discharge: HOME | End: 2017-09-26
Payer: COMMERCIAL

## 2017-09-26 ENCOUNTER — HOSPITAL ENCOUNTER (OUTPATIENT)
Dept: RADIOLOGY | Facility: HOSPITAL | Age: 57
Discharge: HOME | End: 2017-09-26
Attending: INTERNAL MEDICINE
Payer: COMMERCIAL

## 2017-09-26 ENCOUNTER — OFFICE VISIT (OUTPATIENT)
Dept: TRANSPLANT | Facility: CLINIC | Age: 57
End: 2017-09-26
Payer: COMMERCIAL

## 2017-09-26 VITALS
WEIGHT: 203 LBS | BODY MASS INDEX: 28.42 KG/M2 | HEART RATE: 92 BPM | TEMPERATURE: 98 F | SYSTOLIC BLOOD PRESSURE: 162 MMHG | OXYGEN SATURATION: 98 % | DIASTOLIC BLOOD PRESSURE: 101 MMHG | RESPIRATION RATE: 20 BRPM | HEIGHT: 71 IN

## 2017-09-26 DIAGNOSIS — Z79.2 PROPHYLACTIC ANTIBIOTIC: ICD-10-CM

## 2017-09-26 DIAGNOSIS — Z48.298 ENCOUNTER FOLLOWING ORGAN TRANSPLANT: Primary | ICD-10-CM

## 2017-09-26 DIAGNOSIS — Z94.2 LUNG REPLACED BY TRANSPLANT: ICD-10-CM

## 2017-09-26 DIAGNOSIS — Z94.2 LUNG TRANSPLANTED: ICD-10-CM

## 2017-09-26 DIAGNOSIS — J38.3 VOCAL CORD DYSFUNCTION: ICD-10-CM

## 2017-09-26 DIAGNOSIS — D84.9 IMMUNOSUPPRESSION: ICD-10-CM

## 2017-09-26 DIAGNOSIS — I10 ESSENTIAL HYPERTENSION: ICD-10-CM

## 2017-09-26 DIAGNOSIS — R60.0 BILATERAL EDEMA OF LOWER EXTREMITY: ICD-10-CM

## 2017-09-26 LAB
FUNGUS SPEC CULT: NORMAL
FUNGUS SPEC CULT: NORMAL
PRE FEV1 FVC: 89
PRE FEV1: 2.26
PRE FVC: 2.54
PREDICTED FEV1 FVC: 81
PREDICTED FEV1: 3.74
PREDICTED FVC: 4.6

## 2017-09-26 PROCEDURE — 99999 PR PBB SHADOW E&M-EST. PATIENT-LVL III: CPT | Mod: PBBFAC,,, | Performed by: INTERNAL MEDICINE

## 2017-09-26 PROCEDURE — 3008F BODY MASS INDEX DOCD: CPT | Mod: S$GLB,,, | Performed by: INTERNAL MEDICINE

## 2017-09-26 PROCEDURE — 71020 XR CHEST PA AND LATERAL: CPT | Mod: TC

## 2017-09-26 PROCEDURE — 71020 XR CHEST PA AND LATERAL: CPT | Mod: 26,,, | Performed by: RADIOLOGY

## 2017-09-26 PROCEDURE — 94010 BREATHING CAPACITY TEST: CPT | Mod: S$GLB,,, | Performed by: INTERNAL MEDICINE

## 2017-09-26 PROCEDURE — 99214 OFFICE O/P EST MOD 30 MIN: CPT | Mod: 25,S$GLB,, | Performed by: INTERNAL MEDICINE

## 2017-09-26 RX ORDER — AMLODIPINE BESYLATE 5 MG/1
5 TABLET ORAL DAILY
Qty: 30 TABLET | Refills: 11 | Status: SHIPPED | OUTPATIENT
Start: 2017-09-26 | End: 2017-12-11 | Stop reason: SDUPTHER

## 2017-09-26 NOTE — PROGRESS NOTES
LUNG TRANSPLANT RECIPIENT FOLLOW-UP    Reason for Visit: Follow-up after lung transplantation.                                                                                                         Date of Transplant: 8/22/17                                                                               Reason for Transplant: Sarcoidosis with pulmonary hypertension                                                                               Type of Transplant: bilateral sequential lung                                                                               CMV Status: D+ / R-                                                                               Major Complications:   1. Left vocal cord dysfunction                                                                               History of Present Illness: Martin Hendrix Jr. is a 56 y.o. year old male with a transplant history as outlined above who presents today for his routine visit after bilateral lung transplant. He says that he feels well. He denies shortness of breath or cough.  No problems with appetite. No issues with bowel movements. Tolerating his medications well. Swelling if his legs has improved with as needed furosemide. He still has a hoarse voice and has an appointment with ENT later this week.     Review of Systems   Constitutional: Negative for chills, diaphoresis, fever, malaise/fatigue and weight loss.   HENT: Negative for congestion, ear discharge, ear pain, hearing loss, nosebleeds and sore throat.         Hoarse   Eyes: Negative for blurred vision, double vision and photophobia.   Respiratory: Negative for cough, hemoptysis, sputum production, shortness of breath and wheezing.    Cardiovascular: Positive for leg swelling (minimal with prn furosemide). Negative for chest pain, palpitations, orthopnea, claudication and PND.   Gastrointestinal: Negative for abdominal pain, blood in stool, constipation, diarrhea, heartburn, melena,  "nausea and vomiting.   Genitourinary: Negative for dysuria, flank pain, frequency, hematuria and urgency.   Musculoskeletal: Negative for back pain, falls, joint pain, myalgias and neck pain.   Skin: Negative for itching and rash.   Neurological: Negative for dizziness, tremors, sensory change, loss of consciousness, weakness and headaches.   Endo/Heme/Allergies: Does not bruise/bleed easily.   Psychiatric/Behavioral: Negative for depression, hallucinations and memory loss. The patient is not nervous/anxious and does not have insomnia.      BP (!) 162/101 (BP Location: Left arm, Patient Position: Sitting, BP Method: Large (Automatic))   Pulse 92   Temp 97.5 °F (36.4 °C) (Oral)   Resp 20   Ht 5' 11" (1.803 m)   Wt 92.1 kg (203 lb)   SpO2 98%   BMI 28.31 kg/m²     Physical Exam   Constitutional: He is oriented to person, place, and time and well-developed, well-nourished, and in no distress. No distress.   HENT:   Head: Normocephalic.   Nose: Nose normal.   Mouth/Throat: Oropharynx is clear and moist. No oropharyngeal exudate.   Eyes: Conjunctivae and EOM are normal. Pupils are equal, round, and reactive to light. No scleral icterus.   Neck: Normal range of motion. Neck supple. No JVD present. No tracheal deviation present. No thyromegaly present.   Cardiovascular: Normal rate, regular rhythm and intact distal pulses.  Exam reveals no gallop and no friction rub.    No murmur heard.  Pulmonary/Chest: Effort normal. No stridor. No respiratory distress. He has no wheezes. He has no rales. He exhibits no tenderness.   Abdominal: Soft. Bowel sounds are normal. He exhibits no distension and no mass. There is no tenderness. There is no rebound and no guarding.   Musculoskeletal: Normal range of motion. He exhibits no edema.   Lymphadenopathy:     He has no cervical adenopathy.   Neurological: He is alert and oriented to person, place, and time. No cranial nerve deficit. Gait normal. Coordination normal.   Skin: Skin " is warm and dry. No rash noted. He is not diaphoretic. No erythema. No pallor.   Psychiatric: Mood and affect normal.     Labs:  cbc, cmp, tacrolimus Latest Ref Rng & Units 9/11/2017 9/19/2017 9/26/2017   TACROLIMUS LVL 5.0 - 15.0 ng/mL 12.9 17.4(H) -   WHITE BLOOD CELL COUNT 3.90 - 12.70 K/uL 10.26 7.62 11.50   RBC 4.60 - 6.20 M/uL 3.37(L) 3.58(L) 3.94(L)   HEMOGLOBIN 14.0 - 18.0 g/dL 10.1(L) 10.7(L) 11.7(L)   HEMATOCRIT 40.0 - 54.0 % 31.0(L) 33.0(L) 35.3(L)   MCV 82 - 98 fL 92 92 90   MCH 27.0 - 31.0 pg 30.0 29.9 29.7   MCHC 32.0 - 36.0 g/dL 32.6 32.4 33.1   RDW 11.5 - 14.5 % 14.1 14.0 14.2   PLATELETS 150 - 350 K/uL 286 435(H) 392(H)   MPV 9.2 - 12.9 fL 8.0(L) 8.2(L) 8.5(L)   GRAN # 1.8 - 7.7 K/uL 8.5(H) 5.9 -   LYMPH # 1.0 - 4.8 K/uL 1.0 1.2 -   MONO # 0.3 - 1.0 K/uL 0.5 0.4 -   EOSINOPHIL% 0.0 - 8.0 % 0.8 0.5 -   BASOPHIL% 0.0 - 1.9 % 1.3 0.7 -   DIFFERENTIAL METHOD - Automated Automated -   SODIUM 136 - 145 mmol/L 139 138 138   POTASSIUM 3.5 - 5.1 mmol/L 4.5 4.3 4.8   CHLORIDE 95 - 110 mmol/L 101 100 99   CO2 23 - 29 mmol/L 32(H) 30(H) 32(H)   GLUCOSE 70 - 110 mg/dL 120(H) 135(H) 144(H)   BUN BLD 6 - 20 mg/dL 23(H) 30(H) 25(H)   CREATININE 0.5 - 1.4 mg/dL 1.0 1.2 1.3   CALCIUM 8.7 - 10.5 mg/dL 9.2 9.6 9.5   PROTEIN TOTAL 6.0 - 8.4 g/dL 6.0 6.5 6.7   ALBUMIN 3.5 - 5.2 g/dL 3.0(L) 3.4(L) 3.6   BILIRUBIN TOTAL 0.1 - 1.0 mg/dL 0.6 0.5 0.6   ALK PHOS 55 - 135 U/L 105 122 122   AST 10 - 40 U/L 16 13 16   ALT 10 - 44 U/L 33 25 24   ANION GAP 8 - 16 mmol/L 6(L) 8 7(L)   EGFR IF AFRICAN AMERICAN >60 mL/min/1.73 m:2 >60.0 >60.0 >60.0   EGFR IF NON-AFRICAN AMERICAN >60 mL/min/1.73 m:2 >60.0 >60.0 >60.0     Pulmonary Function Tests 9/26/2017 9/19/2017 9/11/2017 9/5/2017 8/16/2017 6/14/2017 4/26/2017   FVC 2.54 2.36 2.29 2.11 2.03 2.26 1.82   FEV1 2.26 2.13 1.97 1.8 1.2 1.03 1.16   TLC (liters) - - - - - - -   DLCO (ml/mmHg sec) - - - - 14.3 - -   FVC% 53 51 49 45 44 49 38   FEV1% 58 57 53 48 32 1.43 30   FEF 25-75 2.69  2.85 2.26 1.86 0.58 0.76 0.58   FEF 25-75% 68 74 59 49 15 20 15   TLC% - - - - - - -   RV - - - - - - -   RV% - - - - - - -   DLCO% - - - - 51 - -       Imaging:  Results for orders placed during the hospital encounter of 09/26/17   X-Ray Chest PA And Lateral    Narrative Chest 2 views compared to 09/19/2017.  Heart size and pulmonary vascularity is similar.  Patchy opacities in the left mid and lower lung zone appears similar.  Mild blunting of the costophrenic angles.    Impression no interval change.      Electronically signed by: VINICIUS BLAKE MD  Date:     09/26/17  Time:    08:43          Assessment:  1. Encounter following organ transplant    2. Lung transplanted    3. Immunosuppression    4. Prophylactic antibiotic    5. Essential hypertension    6. Vocal cord dysfunction    7. Bilateral edema of lower extremity      Plan:   1. Will continue to monitor his pulmonary functions every two weeks  for now.They are improving as expected. Oxygenation and CXR are stable.     2. Continue tacrolimus, mycophenolate, and prednisone. Adjust tacrolimus as needed.    3. Continue Bactrim, valganciclovir, and fluconazole.    4. Will start amlodipine 5 mg daily today. I advised him that it can cause edema of his lower extremities.    5. He will follow up with ENT for his voice    6. Furosemide 40 mg daily as needed for his edema    7. RTC in 2 weeks

## 2017-09-27 ENCOUNTER — PATIENT MESSAGE (OUTPATIENT)
Dept: OTOLARYNGOLOGY | Facility: CLINIC | Age: 57
End: 2017-09-27

## 2017-09-27 ENCOUNTER — PATIENT MESSAGE (OUTPATIENT)
Dept: TRANSPLANT | Facility: CLINIC | Age: 57
End: 2017-09-27

## 2017-09-27 DIAGNOSIS — Z94.2 LUNG REPLACED BY TRANSPLANT: ICD-10-CM

## 2017-09-27 RX ORDER — TACROLIMUS 1 MG/1
1 CAPSULE ORAL DAILY
Qty: 30 CAPSULE | Refills: 11 | Status: SHIPPED | OUTPATIENT
Start: 2017-09-27 | End: 2017-10-11 | Stop reason: DRUGHIGH

## 2017-09-27 RX ORDER — TACROLIMUS 0.5 MG/1
0.5 CAPSULE ORAL DAILY
Qty: 30 CAPSULE | Refills: 11 | Status: SHIPPED | OUTPATIENT
Start: 2017-09-27 | End: 2017-10-11 | Stop reason: SDUPTHER

## 2017-09-27 NOTE — TELEPHONE ENCOUNTER
----- Message from Darrick Wolfe MD sent at 9/26/2017  1:52 PM CDT -----  Hold one dose and then decrease PM dose to 0.5 mg  ----- Message -----  From: Danielle BRICEÑO Do, RN  Sent: 9/26/2017  12:59 PM  To: Darrick Wolfe MD    9/18/17 decreased to 1 mg bid (from 1.5 mg bid)

## 2017-09-27 NOTE — TELEPHONE ENCOUNTER
Received written orders from Dr. Wolfe regarding tacrolimus dose adjustment.  Notified patient via my [facility] to hold the next dose of tacrolimus then begin taking 1 mg every morning and 0.5 mg every evening.  We will recheck labs on Monday 10/2/17 at the [place] location as requested by the patient. Patient was reminded to hold morning dose of prograf the day of labs until after the labs have been drawn. Patient responded to the message stating understanding of the above instructions.

## 2017-09-29 ENCOUNTER — TELEPHONE (OUTPATIENT)
Dept: TRANSPLANT | Facility: CLINIC | Age: 57
End: 2017-09-29

## 2017-09-29 ENCOUNTER — PATIENT MESSAGE (OUTPATIENT)
Dept: TRANSPLANT | Facility: CLINIC | Age: 57
End: 2017-09-29

## 2017-09-29 ENCOUNTER — PROCEDURE VISIT (OUTPATIENT)
Dept: OTOLARYNGOLOGY | Facility: CLINIC | Age: 57
End: 2017-09-29
Payer: COMMERCIAL

## 2017-09-29 VITALS
HEART RATE: 95 BPM | WEIGHT: 198.44 LBS | TEMPERATURE: 99 F | BODY MASS INDEX: 27.67 KG/M2 | SYSTOLIC BLOOD PRESSURE: 141 MMHG | DIASTOLIC BLOOD PRESSURE: 84 MMHG

## 2017-09-29 DIAGNOSIS — J38.01 UNILATERAL COMPLETE VOCAL FOLD PARALYSIS: Primary | ICD-10-CM

## 2017-09-29 PROCEDURE — 31574 LARGSC W/NJX AUGMENTATION: CPT | Mod: LT,S$GLB,, | Performed by: OTOLARYNGOLOGY

## 2017-09-29 PROCEDURE — L8607 INJ VOCAL CORD BULKING AGENT: HCPCS | Mod: S$GLB,,, | Performed by: OTOLARYNGOLOGY

## 2017-09-29 NOTE — TELEPHONE ENCOUNTER
Returned Sybil's call.  She is asking to have a physician form filled out and signed by Dr. Wolfe letting her place of employment know that she will need additional time off to care for the patient.  Patient will send forms through [employer] and coordinator will have Dr. Wolfe review the form and sign if necessary.  Sybil stated understanding of the above.

## 2017-09-29 NOTE — PROCEDURES
Procedures   [facility]  Department of Otorhinolaryngology and Communication Sciences    Awake Laryngeal Procedure Operative Report    Preoperative Diagnosis: 1. Left vocal fold immobility.  2. Glottal insufficiency.  3. Dysphonia.    Postoperative Diagnosis: 1. Left vocal fold immobility.  2. Glottal insufficiency.  3. Dysphonia.    Procedure: Transnasal flexible magnified laryngoscopy with left vocal fold injection augmentation with hyaluronic acid (Restylane-L).    Surgeon: El Veliz M.D.     Estimated blood loss: None.    Anesthesia: Local and topical.    Complications: None.    Findings: Appropriate medialization, convexity, and support with a total volume of 0.9 mL hyaluronic acid (Restylane-L).    Indications for procedure: Martin Hendrix Jr. is a 56 y.o. male with  left vocal fold immobility,  potentially recoverable, following lung transplant. The patient presents for elective vocal fold injection augmentation. Risks of the procedure were discussed, including but not limited to bleeding, infection, allergic reaction to the injectable or medication, scarring, worsening of voice, early resorption, need for additional procedures, pain, epistaxis, laryngospasm, and airway obstruction which could necessitate need for intubation or tracheostomy. All questions were answered and the patient agreed to move forward with the procedure. Informed consent was obtained.    Procedure in detail: Martin Hendrix Jr. was positively identified with two identifiers and was brought into the procedure suite. He was seated upright in a comfortable position. Final time-out was performed for verification purposes. Local cutaneous and subcutaneous anesthesia was achieved with 1 mL of 2% lidocaine  injected into the skin and subcutaneous tissues at the level of the thyroid notch and into the pre-epiglottic space. Oropharyngeal anesthesia was not necessary. The nasal cavity was topically decongested with 1%  phenylephrine and topically anesthetized with 4% lidocaine. The distal chip digital videolaryngoscope was advanced through the patients most patent nasal cavity. The larynx was inspected under maximal magnification, with findings as noted above.    Attention was turned toward anesthetizing the endolarynx, which was achieved with a total volume of 3 mL of 4% lidocaine administered  in consecutive aliquots through the side port of the laryngoscope using the laryngeal gargle technique.    After an adequate degree of anesthesia was obtained, attention was turned to injection augmentation. A syringe pre-loaded with hyaluronic acid (Restylane-L) was loaded onto a 23 gauge 1.5 inch needle. Under the guidance of magnified laryngoscopy, the needle was advanced percutaneously, through the thyrohyoid membrane and infrapetiole epiglottis, into the endolarynx. The needle was advanced into the left vocal fold at the junction of the vocal process with the superior arcuate line. After confirmation of appropriate depth of the needle tip, careful injection augmentation of the left focal fold was carried out. Appropriate fullness, convexity, support, and medialization were achieved, with slight overcorrection, with a total volume of 0.9 mL injected. A pressed but less effortful voice, as well as a stronger cough, were noted immediately. The equipment was removed. The patient tolerated the procedure well without complication. All needle and instrument counts were correct at the completion of the case.    Attestation: As the attending of record, I was present and participated in all portions of this procedure.    Disposition: The patient was observed briefly before being discharged home in good condition. He was instructed to call the office or return to the emergency department should he develop a fever greater than 101 degrees F, increasing pain not relieved by over-the-counter medication, shortness of breath or noisy breathing,  difficulty swallowing, swelling, bleeding, or any other concerns. Post-procedure instructions were provided and were reviewed with the patient, who acknowledged understanding. He will follow up with me in 4 weeks, or sooner if needed.    El Veliz M.D.  [facility]  Department of Otorhinolaryngology and Communication Sciences

## 2017-09-29 NOTE — PATIENT INSTRUCTIONS
VOCAL FOLD INJECTION AUGMENTATION     Description   If the vocal folds (vocal cords) cannot close completely, you may experience voice problems: roughness, breathiness, inability to get loud, increased vocal effort, increased vocal fatigue. Some patients may also experience aspiration (coughing or choking with swallowing). Vocal fold injection augmentation plumps up the vocal fold and/or repositions it in the midline in order to help the vocal folds close completely while speaking or swallowing. Following the procedure, most patients experience a louder, stronger, clearer voice. The procedure also helps some patients protect against aspiration, although the swallowing improvement is not as dramatic as the voice improvement. We use the following materials for the procedure: hyaluronic acid (Restylane); carboxymethylcellulose (Radiesse Voice Gel); and calcium hydroxyapatite (Radiesse Voice). For most patients, the injection is performed with a small needle passed through the skin of the neck. However, in some patients we perform the injection with a device passed through the mouth. In either case, the injection is guided by the visualization provided by a scope passed through the nose.     What to expect during the procedure   We perform the injection in our office under local (topical) anesthesia. You are awake the whole time, and the entire procedure lasts about 15 minutes. Our primary focus is your safety and comfort. We usually make the larynx numb by spraying the throat and/or dripping anesthetic on the larynx. At this time, you may cough or gag, or you may have the sensation that you spilled some water down the wrong pipe. These are temporary sensations that allow us to get you numb. The numbing process takes about 2 minutes. We will not proceed until we know you will be as comfortable as possible. A small needle is passed either through the skin of the neck or via a long instrument through the mouth to  perform the injection. During the injection, you may experience mild discomfort in the throat. You may feel an unusual sensation in the ear because the larynx and the ear share the same sensory nerve. In rare cases in which a patient does not tolerate the procedure, it may be performed in the operating room, with the patient completely asleep under general anesthesia.     What to expect afterwards   Most patients note a stronger, less effortful voice immediately after the injection. Sometimes the voice is tight or pressed voice is noted right after the injection. The voice usually has good days and bad days and gradually improves until you reach your new baseline voice over the first 1-2 weeks. Voice therapy may be a necessary part of your treatment plan to optimize your vocal outcome. None of the materials we inject are permanent. As the material dissolves, you may experience a gradual worsening of voice quality over the course of several months. At this point we may consider repeating the injection. You may be a candidate for a permanent fix, which involves an open surgery in the neck performed in the operating room.     Instructions: before the procedure   1. Do not take aspirin-containing products or other medications that can thin the blood (such as ibuprofen, Advil, Motrin, Aleve, Plavix) for 7 days prior to the injection. If you take Coumadin (warfarin), you may need to stop using this a few days prior to the injection. If you are on blood thinning (anti-platelet or anti-coagulant) medication and it is not clear what you should do, please clarify this with your physician.   2. On the day of your procedure, please make sure you take your other regular morning medications.   3. On the day of your procedure, it is OK to eat and drink as you would normally, up until 3 hours before to your appointment time. During that time frame, we ask that you restrict yourself only to clear liquids. A clear liquid is anything  you can see through (water, ginger ale, apple juice).     Instructions: after the procedure   1. Please refrain from eating or drinking for 1 hour following the procedure. This allows time for the numbing medication to wear off.   2. Most patients experience very little pain. If necessary, most patients are able to keep comfortable with plain Tylenol (acetaminophen) and/or other non-steroidal anti-inflammatory medications such as ibuprofen (Advil, Motrin). Please follow package instructions if considering taking these medications.   3. In most instances, it is OK to use your voice immediately after the procedure. However, for the first week, you should avoid speaking over heavy background noise or in a very loud voice. It is rare, but in some cases you will be asked to rest your voice for the first 24 hours.   4. Please call the [facility] Center at [phone] if   · You have a temperature above 101°F   · You develop Increasing throat pain not relieved by over-the-counter medications   · You have any other post-operative questions or concerns   5. Please go immediately to the nearest emergency room if you are experiencing   · Shortness of breath   · Difficulty breathing   · Difficulty swallowing   · Severe bleeding     FREQUENTLY ASKED QUESTIONS     Is this a Botox injection? No. Botox weakens the voice box muscles. Instead, with a vocal fold injection augmentation, we are injecting filler material to bulk up the vocal fold(s).     How do you decide which material to use for the injection? Our decision is based on the indication for the procedure, the position of the vocal fold, and other patient historical/anatomical factors. In some instances, the approval of your insurance company is an important factor.     How to you decide which technique to use (through the neck versus through the mouth)? Patient anatomy and the position of the vocal fold play an important role. Other patient factors such as gag reflex are  also strongly considered.     Why do you perform this in your office instead of in the operating room? Performing the procedure in the office is safe and precise. In addition, performing the injection with the patient awake gives us direct visual and auditory feedback, which we do not get when the patient is asleep in the operating room. Furthermore, an office-based injection is much less time consuming, is more convenient for the patient, and does not involve the risks or the recovery time associated with general anesthesia. We can still do this in the operating room; we save that setting for specific diagnoses or situations, as well as for the rare patient who is unable to tolerate the awake procedure.     Why did I have discomfort in my ear (during or after the injection)? This is an example of referred pain. The ear and the larynx share the same sensory nerve.     I got an injection 3 days ago. Why is my voice still hoarse? To optimize vocal outcome, we overinject a little bit. Additionally, there may be a mild amount of swelling. Finally, the muscles of the larynx need to adjust to the injected material. Most people will have good days and bad days at first. After 1-2 weeks, you should settle out to your new baseline voice.     How long does the injection last? Carboxymethylcellulose (Radiesse Voice Gel) lasts approximately 1-2 months. Hyaluronic acid (Restylane) lasts approximately 4 months. Calcium hydroxyapatite (Radiesse Voice) lasts up to 1 year; however its characteristics are such that only few patients are appropriate for using this material.     Is there a permanent injectable material? No.     Can the injection be repeated? Yes. There is no limit to the number of times an injection can be repeated. However, a permanent surgical fix is often an option to consider.

## 2017-09-29 NOTE — TELEPHONE ENCOUNTER
----- Message from Zeinab Lerma sent at 9/29/2017 10:43 AM CDT -----  Contact: patient katyaugher   Calling to see if she can get some paper filled out by Yaneth [employer]al for her job.       Please call        Thanks!!!

## 2017-10-02 ENCOUNTER — LAB VISIT (OUTPATIENT)
Dept: LAB | Facility: HOSPITAL | Age: 57
End: 2017-10-02
Attending: INTERNAL MEDICINE
Payer: COMMERCIAL

## 2017-10-02 DIAGNOSIS — Z76.82 AWAITING ORGAN TRANSPLANT: ICD-10-CM

## 2017-10-02 DIAGNOSIS — Z94.2 LUNG REPLACED BY TRANSPLANT: Primary | ICD-10-CM

## 2017-10-02 DIAGNOSIS — Z94.2 LUNG REPLACED BY TRANSPLANT: ICD-10-CM

## 2017-10-02 DIAGNOSIS — D86.9 PULMONARY HYPERTENSION ASSOCIATED WITH SARCOIDOSIS: ICD-10-CM

## 2017-10-02 DIAGNOSIS — R76.0 ELEVATED ANTIBODY LEVELS: ICD-10-CM

## 2017-10-02 DIAGNOSIS — I27.29 PULMONARY HYPERTENSION ASSOCIATED WITH SARCOIDOSIS: ICD-10-CM

## 2017-10-02 DIAGNOSIS — E83.42 HYPOMAGNESEMIA: ICD-10-CM

## 2017-10-02 DIAGNOSIS — D86.0 SARCOIDOSIS OF LUNG: ICD-10-CM

## 2017-10-02 LAB
ALBUMIN SERPL BCP-MCNC: 3.8 G/DL
ALBUMIN SERPL BCP-MCNC: 3.8 G/DL
ALP SERPL-CCNC: 109 U/L
ALP SERPL-CCNC: 109 U/L
ALT SERPL W/O P-5'-P-CCNC: 22 U/L
ALT SERPL W/O P-5'-P-CCNC: 22 U/L
ANION GAP SERPL CALC-SCNC: 9 MMOL/L
AST SERPL-CCNC: 16 U/L
AST SERPL-CCNC: 16 U/L
BILIRUB SERPL-MCNC: 0.5 MG/DL
BILIRUB SERPL-MCNC: 0.5 MG/DL
BUN SERPL-MCNC: 22 MG/DL
C1Q1 TESTING DATE: NORMAL
C1Q1 TESTING DATE: NORMAL
C1Q2 TESTING DATE: NORMAL
CALCIUM SERPL-MCNC: 9.5 MG/DL
CHLORIDE SERPL-SCNC: 98 MMOL/L
CLASS I ANTIBODIES - LUMINEX: NEGATIVE
CLASS I ANTIBODY COMMENTS - LUMINEX: NORMAL
CLASS I ANTIBODY COMMENTS - LUMINEX: NORMAL
CLASS II ANTIBODIES - LUMINEX: NEGATIVE
CLASS II ANTIBODIES - LUMINEX: NORMAL
CLASS II ANTIBODY COMMENTS - LUMINEX: NORMAL
CLASS II ANTIBODY COMMENTS - LUMINEX: NORMAL
CO2 SERPL-SCNC: 30 MMOL/L
CREAT SERPL-MCNC: 1.4 MG/DL
DSA1 TESTING DATE: NORMAL
DSA12 TESTING DATE: NORMAL
DSA2 TESTING DATE: NORMAL
EST. GFR  (AFRICAN AMERICAN): >60 ML/MIN/1.73 M^2
EST. GFR  (NON AFRICAN AMERICAN): 56.6 ML/MIN/1.73 M^2
GLUCOSE SERPL-MCNC: 177 MG/DL
POTASSIUM SERPL-SCNC: 4.4 MMOL/L
PROT SERPL-MCNC: 7.3 G/DL
PROT SERPL-MCNC: 7.3 G/DL
SERUM COLLECTION DT - LUMINEX CLASS I: NORMAL
SERUM COLLECTION DT - LUMINEX CLASS I: NORMAL
SERUM COLLECTION DT - LUMINEX CLASS II: NORMAL
SERUM COLLECTION DT - LUMINEX CLASS II: NORMAL
SODIUM SERPL-SCNC: 137 MMOL/L

## 2017-10-02 PROCEDURE — 80053 COMPREHEN METABOLIC PANEL: CPT

## 2017-10-02 PROCEDURE — 36415 COLL VENOUS BLD VENIPUNCTURE: CPT | Mod: PO

## 2017-10-02 PROCEDURE — 80197 ASSAY OF TACROLIMUS: CPT

## 2017-10-03 ENCOUNTER — PATIENT MESSAGE (OUTPATIENT)
Dept: TRANSPLANT | Facility: CLINIC | Age: 57
End: 2017-10-03

## 2017-10-03 LAB — TACROLIMUS BLD-MCNC: 13 NG/ML

## 2017-10-04 ENCOUNTER — PATIENT MESSAGE (OUTPATIENT)
Dept: TRANSPLANT | Facility: CLINIC | Age: 57
End: 2017-10-04

## 2017-10-04 ENCOUNTER — SURGERY (OUTPATIENT)
Age: 57
End: 2017-10-04

## 2017-10-04 ENCOUNTER — HOSPITAL ENCOUNTER (OUTPATIENT)
Facility: HOSPITAL | Age: 57
Discharge: HOME | End: 2017-10-04
Attending: INTERNAL MEDICINE | Admitting: INTERNAL MEDICINE
Payer: COMMERCIAL

## 2017-10-04 VITALS
HEART RATE: 95 BPM | DIASTOLIC BLOOD PRESSURE: 93 MMHG | SYSTOLIC BLOOD PRESSURE: 138 MMHG | TEMPERATURE: 98 F | BODY MASS INDEX: 28.42 KG/M2 | HEIGHT: 71 IN | RESPIRATION RATE: 18 BRPM | WEIGHT: 203 LBS | OXYGEN SATURATION: 95 %

## 2017-10-04 DIAGNOSIS — Z94.2 LUNG REPLACED BY TRANSPLANT: Primary | ICD-10-CM

## 2017-10-04 LAB
APPEARANCE FLD: CLEAR
BODY FLD TYPE: NORMAL
COLOR FLD: COLORLESS
LYMPHOCYTES NFR FLD MANUAL: 8 %
MONOS+MACROS NFR FLD MANUAL: 85 %
NEUTROPHILS NFR FLD MANUAL: 7 %
POCT GLUCOSE: 253 MG/DL (ref 70–110)
WBC # FLD: 77 /CU MM

## 2017-10-04 PROCEDURE — 71000016 HC POSTOP RECOV ADDL HR

## 2017-10-04 PROCEDURE — 31623 DX BRONCHOSCOPE/BRUSH: CPT | Mod: 59,RT,, | Performed by: INTERNAL MEDICINE

## 2017-10-04 PROCEDURE — 88313 SPECIAL STAINS GROUP 2: CPT | Mod: 26,,, | Performed by: PATHOLOGY

## 2017-10-04 PROCEDURE — 31624 DX BRONCHOSCOPE/LAVAGE: CPT | Performed by: INTERNAL MEDICINE

## 2017-10-04 PROCEDURE — 71000015 HC POSTOP RECOV 1ST HR

## 2017-10-04 PROCEDURE — 87205 SMEAR GRAM STAIN: CPT

## 2017-10-04 PROCEDURE — 87581 M.PNEUMON DNA AMP PROBE: CPT

## 2017-10-04 PROCEDURE — 82962 GLUCOSE BLOOD TEST: CPT

## 2017-10-04 PROCEDURE — 87305 ASPERGILLUS AG IA: CPT

## 2017-10-04 PROCEDURE — 88305 TISSUE EXAM BY PATHOLOGIST: CPT | Mod: 26,,, | Performed by: PATHOLOGY

## 2017-10-04 PROCEDURE — 27201011 HC FORCEPS DISPOSABLE: Performed by: INTERNAL MEDICINE

## 2017-10-04 PROCEDURE — 87116 MYCOBACTERIA CULTURE: CPT | Mod: 59

## 2017-10-04 PROCEDURE — 63600175 PHARM REV CODE 636 W HCPCS: Performed by: INTERNAL MEDICINE

## 2017-10-04 PROCEDURE — 88312 SPECIAL STAINS GROUP 1: CPT | Mod: 26,,, | Performed by: PATHOLOGY

## 2017-10-04 PROCEDURE — 87070 CULTURE OTHR SPECIMN AEROBIC: CPT

## 2017-10-04 PROCEDURE — 99153 MOD SED SAME PHYS/QHP EA: CPT | Performed by: INTERNAL MEDICINE

## 2017-10-04 PROCEDURE — 87102 FUNGUS ISOLATION CULTURE: CPT

## 2017-10-04 PROCEDURE — 99152 MOD SED SAME PHYS/QHP 5/>YRS: CPT | Performed by: INTERNAL MEDICINE

## 2017-10-04 PROCEDURE — 25000003 PHARM REV CODE 250: Performed by: INTERNAL MEDICINE

## 2017-10-04 PROCEDURE — 88305 TISSUE EXAM BY PATHOLOGIST: CPT | Performed by: PATHOLOGY

## 2017-10-04 PROCEDURE — 31628 BRONCHOSCOPY/LUNG BX EACH: CPT | Performed by: INTERNAL MEDICINE

## 2017-10-04 PROCEDURE — 31623 DX BRONCHOSCOPE/BRUSH: CPT | Performed by: INTERNAL MEDICINE

## 2017-10-04 PROCEDURE — 89051 BODY FLUID CELL COUNT: CPT

## 2017-10-04 PROCEDURE — 31624 DX BRONCHOSCOPE/LAVAGE: CPT | Mod: 59,RT,, | Performed by: INTERNAL MEDICINE

## 2017-10-04 PROCEDURE — 31628 BRONCHOSCOPY/LUNG BX EACH: CPT | Mod: RT,,, | Performed by: INTERNAL MEDICINE

## 2017-10-04 PROCEDURE — 87015 SPECIMEN INFECT AGNT CONCNTJ: CPT

## 2017-10-04 RX ORDER — FENTANYL CITRATE 50 UG/ML
INJECTION, SOLUTION INTRAMUSCULAR; INTRAVENOUS CODE/TRAUMA/SEDATION MEDICATION
Status: COMPLETED | OUTPATIENT
Start: 2017-10-04 | End: 2017-10-04

## 2017-10-04 RX ORDER — LIDOCAINE HYDROCHLORIDE 20 MG/ML
INJECTION, SOLUTION INFILTRATION; PERINEURAL CODE/TRAUMA/SEDATION MEDICATION
Status: COMPLETED | OUTPATIENT
Start: 2017-10-04 | End: 2017-10-04

## 2017-10-04 RX ORDER — LIDOCAINE HYDROCHLORIDE 10 MG/ML
INJECTION INFILTRATION; PERINEURAL CODE/TRAUMA/SEDATION MEDICATION
Status: COMPLETED | OUTPATIENT
Start: 2017-10-04 | End: 2017-10-04

## 2017-10-04 RX ORDER — MIDAZOLAM HYDROCHLORIDE 5 MG/ML
INJECTION INTRAMUSCULAR; INTRAVENOUS CODE/TRAUMA/SEDATION MEDICATION
Status: COMPLETED | OUTPATIENT
Start: 2017-10-04 | End: 2017-10-04

## 2017-10-04 RX ADMIN — LIDOCAINE HYDROCHLORIDE 8 ML: 10 INJECTION, SOLUTION INFILTRATION; PERINEURAL at 01:10

## 2017-10-04 RX ADMIN — LIDOCAINE HYDROCHLORIDE 4 ML: 20 INJECTION, SOLUTION INFILTRATION; PERINEURAL at 01:10

## 2017-10-04 RX ADMIN — MIDAZOLAM 3 MG: 5 INJECTION INTRAMUSCULAR; INTRAVENOUS at 01:10

## 2017-10-04 RX ADMIN — BENZOCAINE, BUTAMBEN, AND TETRACAINE HYDROCHLORIDE 1 SPRAY: .028; .004; .004 AEROSOL, SPRAY TOPICAL at 01:10

## 2017-10-04 RX ADMIN — FENTANYL CITRATE 75 MCG: 50 INJECTION, SOLUTION INTRAMUSCULAR; INTRAVENOUS at 01:10

## 2017-10-04 NOTE — SEDATION DOCUMENTATION
H and P updated-yes, patient placed on cardiac monitor, VSS  Anesthesia Plan:  conscious sedation   ASA verified-yes  Airway exam performed-yes  Personal or Family history of anesthesia complications-No  Consent signed and witnessed, Ranjana Kimbrough RN

## 2017-10-04 NOTE — INTERVAL H&P NOTE
The patient has been examined and the H&P has been reviewed:    I concur with the findings and no changes have occurred since H&P was written.    Anesthesia/Surgery risks, benefits and alternative options discussed and understood by patient/family.          Active Hospital Problems    Diagnosis  POA    *Lung replaced by transplant [Z94.2]  Not Applicable      Resolved Hospital Problems    Diagnosis Date Resolved POA   No resolved problems to display.

## 2017-10-04 NOTE — PLAN OF CARE
Discharge instructions given, Pt verbalizes understanding. . Vital Signs stable. No complaints. No questions or concerns at this time. Family at bedside    Respirations even and unlabored. No distress noted.

## 2017-10-04 NOTE — SEDATION DOCUMENTATION
BAL RML 90 ml nacl in 40 ml return also send for Galactomannan, TBBX RLL, [place] micro R main.  Verbal report given at bedside in DOSC and includes documentation charted in procedural sedation documentation.  Patient to be NPO 1 hour post procedure and place in PO tolerance at 1440.  Moderate concious sedation was performed and cardiorespiratory functions were monitored the entire procedure by Ranjana Kimbrough RN.  Sedation began at 1329  and concluded at 1355.  Ranjana Kimbrough RN

## 2017-10-04 NOTE — DISCHARGE INSTRUCTIONS
Image-Guided Biopsy     A needle is used to take a sample of tissue from inside the body.     A biopsy is a small sample of tissue or fluid taken from your body. This sample is then studied in a lab. Image-guided biopsy lets your health care provider take a sample from an abnormal mass without using surgery. This procedure is done by a general radiologist. It can also be done by a specially trained doctor called an interventional radiologist.  Before your procedure  Tell your health care provider about any medicines, herbs, or supplements you are taking. This includes any over-the-counter medicines such as aspirin or ibuprofen.  Also tell your provider if you:  · Are pregnant or think you may be pregnant  · Are allergic to any medicines  Follow any directions you are given for not eating or drinking before the procedure. Follow any other instructions from your provider.  During your procedure  · You will change into a hospital gown and lie on a special table. The table that is used will depend on the type of imaging that will guide the biopsy. You may lie on your back, front, or side. Your position depends on where the biopsy is to be done.  · An IV (intravenous) line may be started. This is to give you fluids and medicines. You may be given medicine through the IV to help you relax.  · The skin over the biopsy site is cleaned. Medicine is put on the site to numb the skin.  · The radiologist will use CT (computed tomography), MRI, X-ray, or ultrasound images as a guide. He or she will put a thin, hollow needle through the skin. He or she will guide it to the area where the biopsy is to be done.  · The needle is used to take a sample of tissue or fluid from the area. The needle is then taken out. The sample is sent to a lab. It will be checked for cells that aren't normal.  After your procedure  · You will most likely be able to go home in a few hours.  · Be sure to have a friend or family member drive you home if  you have had sedation.   · Care for the insertion site as directed.  Possible risks and complications  Possible risks and complications of an image-guided biopsy include:  · Bruising or bleeding at the place where the needle was put in  · Bleeding inside your body  · Damage to body areas along the path of the needle   Date Last Reviewed: 6/11/2015  © 7695-7524 [employer], "[employer]. [address] [place] [address]. All rights reserved. This information is not intended as a substitute for professional medical care. Always follow your healthcare professional's instructions.

## 2017-10-04 NOTE — DISCHARGE SUMMARY
[facility]  Lung Transplant  Discharge Summary      Patient Name: Martin Hendrix Jr.  MRN: [id number]  Admission Date: 10/4/2017  Hospital Length of Stay: 0 days  Discharge Date and Time: 10/04/2017 1:52 PM  Attending Physician: Darrick Wolfe MD   Discharging Provider: Darrick Wolfe MD  Primary Care Provider: Sukhi Dunham Jr, MD     HPI: Mr. Hendrix was admitted for surveillance bronchoscopy.    Procedure(s) (LRB):  flexible bronchoscopy with tissue biopsy CPT 17306 (N/A)     Hospital Course: Surveillance bronchoscopy was performed without complications.        Significant Diagnostic Studies: Bronchoscopy: see separate report    Pending Diagnostic Studies:     Procedure Component Value Units Date/Time    X-Ray Chest AP Single View [[id number]]     Order Status:  Sent Lab Status:  No result         Final Active Diagnoses:    Diagnosis Date Noted POA    PRINCIPAL PROBLEM:  Lung replaced by transplant [Z94.2] 08/24/2017 Not Applicable      Problems Resolved During this Admission:    Diagnosis Date Noted Date Resolved POA      Discharged Condition: good    Disposition: Home or Self Care    Medications:  Reconciled Home Medications:   Current Discharge Medication List      CONTINUE these medications which have NOT CHANGED    Details   amlodipine (NORVASC) 5 MG tablet Take 1 tablet (5 mg total) by mouth once daily.  Qty: 30 tablet, Refills: 11    Associated Diagnoses: Essential hypertension      aspirin (ECOTRIN) 81 MG EC tablet Take 1 tablet (81 mg total) by mouth once daily.  Qty: 30 tablet, Refills: 11      calcium-vitamin D tablet 600 mg-200 units Take 1 tablet by mouth 2 (two) times daily.  Qty: 60 tablet, Refills: 11      famotidine (PEPCID) 20 MG tablet Take 1 tablet (20 mg total) by mouth 2 (two) times daily.  Qty: 60 tablet, Refills: 11      fluconazole (DIFLUCAN) 200 MG Tab Take 2 tablets (400 mg total) by mouth once daily.  Qty: 60 tablet, Refills: 6      folic acid (FOLVITE) 1 MG  tablet Take 1 tablet (1 mg total) by mouth once daily.  Qty: 30 tablet, Refills: 11      furosemide (LASIX) 40 MG tablet Take 1 tablet (40 mg total) by mouth once daily.  Qty: 30 tablet, Refills: 11    Associated Diagnoses: Edema of both legs      guaifenesin (MUCINEX) 600 mg 12 hr tablet Take 1 tablet (600 mg total) by mouth 2 (two) times daily.  Qty: 60 tablet, Refills: 11      magnesium oxide (MAG-OX) 400 mg tablet Take 1 tablet (400 mg total) by mouth 2 (two) times daily.  Qty: 60 tablet, Refills: 11      multivitamin (THERAGRAN) per tablet Take 1 tablet by mouth once daily.      mycophenolate (CELLCEPT) 250 mg Cap Take 2 capsules (500 mg total) by mouth 2 (two) times daily.  Qty: 120 capsule, Refills: 11      oxycodone-acetaminophen (PERCOCET) 7.5-325 mg per tablet Take 1 tablet by mouth every 4 (four) hours as needed.  Qty: 40 tablet, Refills: 0      predniSONE (DELTASONE) 10 MG tablet Take by mouth. Taper:  30 mg x 21 d, 20 mg x 60 d, 15 mg x 30 d, 10 mg x 60 d, then 5 mg daily.  Qty: 100 tablet, Refills: 11      sulfamethoxazole-trimethoprim 800-160mg (BACTRIM DS) 800-160 mg Tab Take 1 tablet by mouth every Mon, Wed, Fri.  Qty: 15 tablet, Refills: 11      !! tacrolimus (PROGRAF) 0.5 MG Cap Take 1 capsule (0.5 mg total) by mouth once daily. Take every evening. Total daily dose: 1 mg in AM and 0.5 mg in PM.  Qty: 30 capsule, Refills: 11    Associated Diagnoses: Lung replaced by transplant      !! tacrolimus (PROGRAF) 1 MG Cap Take 1 capsule (1 mg total) by mouth once daily. Take in the morning.  Qty: 30 capsule, Refills: 11    Associated Diagnoses: Lung replaced by transplant      valganciclovir (VALCYTE) 450 mg Tab Take 1 tablet (450 mg total) by mouth 2 (two) times daily.  Qty: 60 tablet, Refills: 11      ACCU-CHEK DEANNE PLUS METER Misc USE AS DIRECTED  Refills: 0      ACCU-CHEK SOFTCLIX LANCETS Misc CHECK BLOOD GLUCOSE ONCE QD  Refills: 5       !! - Potential duplicate medications found. Please discuss with  provider.        Patient Instructions:     Diet general     Call MD for:  temperature >101     Call MD for:  coughing up blood greater than 3 tablespoons in volume     Call MD for:  chest pain     Call MD for:  difficulty breathing or shortness of breath     Call MD for:  development of yellow/green sputum       Follow Up:  Follow-up Information     Darrick Wolfe MD In 2 weeks.    Specialty:  Transplant  Contact information:  [address] [place] [address] [id number] [phone]                   Darrick Wolfe MD  Lung Transplant  [facility]

## 2017-10-04 NOTE — PROGRESS NOTES
Pt glucose 253. Dr Wolfe's nurse Ely aware, she will let him know. He does not take medication for hyperglycemia at this time.

## 2017-10-06 ENCOUNTER — TELEPHONE (OUTPATIENT)
Dept: TRANSPLANT | Facility: HOSPITAL | Age: 57
End: 2017-10-06

## 2017-10-06 ENCOUNTER — TELEPHONE (OUTPATIENT)
Dept: TRANSPLANT | Facility: CLINIC | Age: 57
End: 2017-10-06

## 2017-10-06 DIAGNOSIS — T86.810 LUNG TRANSPLANT REJECTION: Primary | ICD-10-CM

## 2017-10-06 LAB
BACTERIA SPEC AEROBE CULT: NO GROWTH
BACTERIA SPEC AEROBE CULT: NORMAL
ENTEROVIRUS: NOT DETECTED
GALACTOMANNAN AG SPEC-ACNC: <0.5 INDEX
GRAM STN SPEC: NORMAL
HUMAN BOCAVIRUS: NOT DETECTED
HUMAN CORONAVIRUS, COMMON COLD VIRUS: NOT DETECTED
INFLUENZA A - H1N1-09: NOT DETECTED
LEGIONELLA PNEUMOPHILA: NOT DETECTED
MORAXELLA CATARRHALIS: NOT DETECTED
PARAINFLUENZA: NOT DETECTED
RVP - ADENOVIRUS: NOT DETECTED
RVP - HUMAN METAPNEUMOVIRUS (HMPV): NOT DETECTED
RVP - INFLUENZA A: NOT DETECTED
RVP - INFLUENZA B: NOT DETECTED
RVP - RESPIRATORY SYNCTIAL VIRUS (RSV) A: NOT DETECTED
RVP - RESPIRATORY VIRAL PANEL, SOURCE: NORMAL
RVP - RHINOVIRUS: NOT DETECTED
TEM - ACINETOBACTER BAUMANNII: NOT DETECTED
TEM - BORDETELLA PERTUSSIS: NOT DETECTED
TEM - CHLAMYDOPHILA PNEUMONIAE: NOT DETECTED
TEM - KLEBSIELLA PNEUMONIAE: NOT DETECTED
TEM - MRSA: NOT DETECTED
TEM - MYCOPLASMA PNEUMONIAE: NOT DETECTED
TEM - NEISSERIA MENINGITIDIS: NOT DETECTED
TEM - PANTON-VALENTINE: NOT DETECTED
TEM - PSEUDOMONAS AERUGINOSA: NOT DETECTED
TEM - STAPHYLOCOCCUS AUREUS: NOT DETECTED
TEM - STREPTOCOCCUS PNEUMONIAE: NOT DETECTED
TEM - STREPTOCOCCUS PYOGENES A: NOT DETECTED
TEM- HAEMOPHILUS INFLUENZAE B: NOT DETECTED
TEM- HAEMOPHILUS INFLUENZAE: NOT DETECTED

## 2017-10-06 NOTE — TELEPHONE ENCOUNTER
Received written orders from Dr. Wolfe regarding steroid infusion.  Patient is to begin daily IV solumedrol infusion at 1500 mg daily for 3 consecutive days with [facility].  Contacted [employer] and spoke to Timur to have patient start the first dose on Monday 10/9/17.  Orders faxed to [facility].  Patient was contacted and informed that we need to begin pulse steroid infusions x 3 days for A2 rejection.  Patient asked to go to the [facility] [place] location.  [place] location was requested.  Patient verbalized understanding of the above instructions.

## 2017-10-06 NOTE — TELEPHONE ENCOUNTER
Orders received for IV Solumedrol.  Pt currently established with [facility]. DEV verified with Coordinator ALESSANDRA Aaron RN that pt will need to be self taught to do IVs at home over the next two days.  DEV faxed all documents and confirmed receipt with Luz. Pt needing to go to [facility] for first infusion. SW was transferred to [place] office however [place] states there is no nurse available today. DEV also contacted [place] office and was informed that there may not be able to schedule pt either. DEV informed pt RN coordinator. Dr. Wolfe states is needed pt can go on Monday. ALESSANDRA Aaron RN to follow up with pt and cared[facility].   SW to remains available.      [facility]  Ph# [phone]/Fx# [phone]  [facility]'s (Ph# [phone])  (Fx# [phone])

## 2017-10-06 NOTE — TELEPHONE ENCOUNTER
----- Message from Darrick Wolfe MD sent at 10/6/2017 12:45 PM CDT -----  He is going to need therapy for A2 rejection. SM 15 mg/kg X 3-outpatient

## 2017-10-10 ENCOUNTER — HOSPITAL ENCOUNTER (OUTPATIENT)
Dept: PULMONOLOGY | Facility: CLINIC | Age: 57
Discharge: HOME | End: 2017-10-10
Payer: COMMERCIAL

## 2017-10-10 ENCOUNTER — TELEPHONE (OUTPATIENT)
Dept: TRANSPLANT | Facility: CLINIC | Age: 57
End: 2017-10-10

## 2017-10-10 ENCOUNTER — HOSPITAL ENCOUNTER (OUTPATIENT)
Dept: RADIOLOGY | Facility: HOSPITAL | Age: 57
Discharge: HOME | End: 2017-10-10
Attending: INTERNAL MEDICINE
Payer: COMMERCIAL

## 2017-10-10 ENCOUNTER — OFFICE VISIT (OUTPATIENT)
Dept: TRANSPLANT | Facility: CLINIC | Age: 57
End: 2017-10-10
Payer: COMMERCIAL

## 2017-10-10 VITALS
TEMPERATURE: 96 F | BODY MASS INDEX: 28.06 KG/M2 | DIASTOLIC BLOOD PRESSURE: 89 MMHG | WEIGHT: 196 LBS | HEIGHT: 70 IN | OXYGEN SATURATION: 98 % | SYSTOLIC BLOOD PRESSURE: 134 MMHG | RESPIRATION RATE: 20 BRPM | HEART RATE: 92 BPM

## 2017-10-10 DIAGNOSIS — Z79.2 PROPHYLACTIC ANTIBIOTIC: ICD-10-CM

## 2017-10-10 DIAGNOSIS — Z94.2 LUNG TRANSPLANTED: ICD-10-CM

## 2017-10-10 DIAGNOSIS — Z48.298 ENCOUNTER FOLLOWING ORGAN TRANSPLANT: Primary | ICD-10-CM

## 2017-10-10 DIAGNOSIS — Z94.2 LUNG REPLACED BY TRANSPLANT: ICD-10-CM

## 2017-10-10 DIAGNOSIS — D84.9 IMMUNOSUPPRESSION: ICD-10-CM

## 2017-10-10 DIAGNOSIS — N17.9 AKI (ACUTE KIDNEY INJURY): ICD-10-CM

## 2017-10-10 LAB
PRE FEV1 FVC: 90
PRE FEV1: 2.57
PRE FVC: 2.84
PREDICTED FEV1 FVC: 81
PREDICTED FEV1: 3.74
PREDICTED FVC: 4.6

## 2017-10-10 PROCEDURE — 94010 BREATHING CAPACITY TEST: CPT | Mod: S$GLB,,, | Performed by: INTERNAL MEDICINE

## 2017-10-10 PROCEDURE — 99999 PR PBB SHADOW E&M-EST. PATIENT-LVL III: CPT | Mod: PBBFAC,,, | Performed by: INTERNAL MEDICINE

## 2017-10-10 PROCEDURE — 99214 OFFICE O/P EST MOD 30 MIN: CPT | Mod: 25,S$GLB,, | Performed by: INTERNAL MEDICINE

## 2017-10-10 PROCEDURE — 71020 XR CHEST PA AND LATERAL: CPT | Mod: TC

## 2017-10-10 PROCEDURE — 71020 XR CHEST PA AND LATERAL: CPT | Mod: 26,,, | Performed by: RADIOLOGY

## 2017-10-10 NOTE — TELEPHONE ENCOUNTER
----- Message from Star Pollard sent at 10/10/2017  8:32 AM CDT -----  [facility]   Would like a call regarding orders that is not in compliance.    Call

## 2017-10-10 NOTE — TELEPHONE ENCOUNTER
Returned call to Maral from [facility].  Maral was looking for signed orders that were faxed to coordinator previously.  Informed Maral that coordinator did not receive and orders to review and sign.  Fax number was confirmed, Maral did not have correct fax number.  Maral will refax form today.  Coordinator will have Dr. oWlfe review and sign when forms are received.

## 2017-10-11 ENCOUNTER — PATIENT MESSAGE (OUTPATIENT)
Dept: TRANSPLANT | Facility: CLINIC | Age: 57
End: 2017-10-11

## 2017-10-11 DIAGNOSIS — Z94.2 LUNG REPLACED BY TRANSPLANT: ICD-10-CM

## 2017-10-11 RX ORDER — TACROLIMUS 0.5 MG/1
0.5 CAPSULE ORAL EVERY 12 HOURS
Qty: 60 CAPSULE | Refills: 11 | Status: SHIPPED | OUTPATIENT
Start: 2017-10-11 | End: 2017-10-17 | Stop reason: SDUPTHER

## 2017-10-11 NOTE — TELEPHONE ENCOUNTER
Verified verbal order read back from Dr. Wolfe regarding the following medication dose adjustment. Patient is to decrease prograf to 0.5 mg BID from 1/0.5 mg.  Patient will have repeat labs locally in [place] on Monday 10/16/17. Patient was contacted and the above instructions were reviewed.  Patient noted the changes and verbalized understanding.

## 2017-10-11 NOTE — PROGRESS NOTES
LUNG TRANSPLANT RECIPIENT FOLLOW-UP    Reason for Visit: Follow-up after lung transplantation.                                                                                                         Date of Transplant: 8/22/17                                                                               Reason for Transplant: Sarcoidosis with pulmonary hypertension                                                                               Type of Transplant: bilateral sequential lung                                                                               CMV Status: D+ / R-                                                                               Major Complications:   1. Left vocal cord dysfunction                                                                               History of Present Illness: Martin Hendrix Jr. is a 56 y.o. year old male with a transplant history as outlined above who presents today for his routine visit after bilateral lung transplant. He says that he feels well. He denies shortness of breath or cough.  No problems with appetite. No issues with bowel movements. Tolerating his medications well. Swelling if his legs has improved with as needed furosemide.     He was diagnosed with A1-A2 rejection after his first surveillance bronchoscopy and is completing pulse steroids.    Review of Systems   Constitutional: Negative for chills, diaphoresis, fever, malaise/fatigue and weight loss.   HENT: Negative for congestion, ear discharge, ear pain, hearing loss, nosebleeds and sore throat.         Hoarse   Eyes: Negative for blurred vision, double vision and photophobia.   Respiratory: Negative for cough, hemoptysis, sputum production, shortness of breath and wheezing.    Cardiovascular: Negative for chest pain, palpitations, orthopnea, claudication, leg swelling (minimal with prn furosemide) and PND.   Gastrointestinal: Negative for abdominal pain, blood in stool, constipation,  "diarrhea, heartburn, melena, nausea and vomiting.   Genitourinary: Negative for dysuria, flank pain, frequency, hematuria and urgency.   Musculoskeletal: Negative for back pain, falls, joint pain, myalgias and neck pain.   Skin: Negative for itching and rash.   Neurological: Negative for dizziness, tremors, sensory change, loss of consciousness, weakness and headaches.   Endo/Heme/Allergies: Does not bruise/bleed easily.   Psychiatric/Behavioral: Negative for depression, hallucinations and memory loss. The patient is not nervous/anxious and does not have insomnia.      /89 (BP Location: Right arm, Patient Position: Sitting, BP Method: Large (Automatic))   Pulse 92   Temp 96.4 °F (35.8 °C) (Oral)   Resp 20   Ht 5' 10" (1.778 m)   Wt 88.9 kg (196 lb)   SpO2 98%   BMI 28.12 kg/m²     Physical Exam   Constitutional: He is oriented to person, place, and time and well-developed, well-nourished, and in no distress. No distress.   HENT:   Head: Normocephalic.   Nose: Nose normal.   Mouth/Throat: Oropharynx is clear and moist. No oropharyngeal exudate.   Eyes: Conjunctivae and EOM are normal. Pupils are equal, round, and reactive to light. No scleral icterus.   Neck: Normal range of motion. Neck supple. No JVD present. No tracheal deviation present. No thyromegaly present.   Cardiovascular: Normal rate, regular rhythm and intact distal pulses.  Exam reveals no gallop and no friction rub.    No murmur heard.  Pulmonary/Chest: Effort normal. No stridor. No respiratory distress. He has no wheezes. He has no rales. He exhibits no tenderness.   Abdominal: Soft. Bowel sounds are normal. He exhibits no distension and no mass. There is no tenderness. There is no rebound and no guarding.   Musculoskeletal: Normal range of motion. He exhibits no edema.   Lymphadenopathy:     He has no cervical adenopathy.   Neurological: He is alert and oriented to person, place, and time. No cranial nerve deficit. Gait normal. " Coordination normal.   Skin: Skin is warm and dry. No rash noted. He is not diaphoretic. No erythema. No pallor.   Psychiatric: Mood and affect normal.     Labs:  cbc, cmp, tacrolimus Latest Ref Rng & Units 10/2/2017 10/2/2017 10/10/2017   TACROLIMUS LVL 5.0 - 15.0 ng/mL - - 11.2   WHITE BLOOD CELL COUNT 3.90 - 12.70 K/uL - - 17.16(H)   RBC 4.60 - 6.20 M/uL - - 4.32(L)   HEMOGLOBIN 14.0 - 18.0 g/dL - - 13.0(L)   HEMATOCRIT 40.0 - 54.0 % - - 38.4(L)   MCV 82 - 98 fL - - 89   MCH 27.0 - 31.0 pg - - 30.1   MCHC 32.0 - 36.0 g/dL - - 33.9   RDW 11.5 - 14.5 % - - 13.4   PLATELETS 150 - 350 K/uL - - 402(H)   MPV 9.2 - 12.9 fL - - 9.1(L)   GRAN # 1.8 - 7.7 K/uL - - 16.1(H)   LYMPH # 1.0 - 4.8 K/uL - - 0.7(L)   MONO # 0.3 - 1.0 K/uL - - 0.2(L)   EOSINOPHIL% 0.0 - 8.0 % - - 0.0   BASOPHIL% 0.0 - 1.9 % - - 0.1   DIFFERENTIAL METHOD - - - Automated   SODIUM 136 - 145 mmol/L 137 137 135(L)   POTASSIUM 3.5 - 5.1 mmol/L 4.4 4.4 4.8   CHLORIDE 95 - 110 mmol/L 98 98 97   CO2 23 - 29 mmol/L 30(H) 30(H) 23   GLUCOSE 70 - 110 mg/dL 177(H) 177(H) 439(H)   BUN BLD 6 - 20 mg/dL 22(H) 22(H) 48(H)   CREATININE 0.5 - 1.4 mg/dL 1.4 1.4 1.7(H)   CALCIUM 8.7 - 10.5 mg/dL 9.5 9.5 9.9   PROTEIN TOTAL 6.0 - 8.4 g/dL - 7.3 7.7   ALBUMIN 3.5 - 5.2 g/dL - 3.8 3.9   BILIRUBIN TOTAL 0.1 - 1.0 mg/dL - 0.5 0.4   ALK PHOS 55 - 135 U/L - 109 111   AST 10 - 40 U/L - 16 18   ALT 10 - 44 U/L - 22 30   ANION GAP 8 - 16 mmol/L 9 9 15   EGFR IF AFRICAN AMERICAN >60 mL/min/1.73 m:2 >60.0 >60.0 51.7(A)   EGFR IF NON-AFRICAN AMERICAN >60 mL/min/1.73 m:2 56.6(A) 56.6(A) 44.7(A)     Pulmonary Function Tests 10/10/2017 9/26/2017 9/19/2017 9/11/2017 9/5/2017 8/16/2017 6/14/2017   FVC 2.84 2.54 2.36 2.29 2.11 2.03 2.26   FEV1 2.57 2.26 2.13 1.97 1.8 1.2 1.03   TLC (liters) - - - - - - -   DLCO (ml/mmHg sec) - - - - - 14.3 -   FVC% 61 53 51 49 45 44 49   FEV1% 69 58 57 53 48 32 1.43   FEF 25-75 3.26 2.69 2.85 2.26 1.86 0.58 0.76   FEF 25-75% 85 68 74 59 49 15 20   TLC% -  - - - - - -   RV - - - - - - -   RV% - - - - - - -   DLCO% - - - - - 51 -       Imaging:  Results for orders placed during the hospital encounter of 10/10/17   X-Ray Chest PA And Lateral    Narrative Time of Procedure: 10/10/17 08:06:46  Accession # [id number]    Comparison:   Postoperative chest radiographs: 10/4/2017.  A 9/19/2017.  9/5/2017.  8/27/2017.  8/22/2017, 1450 hrs.  Preoperative chest radiograph: 8/21/2017, 1945 hrs.  4/24/2017.      Number of views: 2.     Findings:  Mediastinal structures are midline.  Sternal sutures are intact and aligned.    There is mild elevation of the LEFT hemidiaphragm similar to prior postoperative examinations.  This could reflect mild phrenic nerve paresis; clinical considerations will determine if fluoroscopic sniff test is warranted.    There is a modest amount of soft tissue attenuation at the margins of the hemithoraces on PA chest radiograph and in the LEFT posterior costophrenic angle on lateral view consistent with a small amount of pleural fluid.  This is not unusual in light of the history of recent lung transplant.    I detect no pneumothorax, pneumomediastinum, pneumoperitoneum or significant osseous abnormality.    Impression No unusual postoperative findings identified in this patient status post bilateral sequential lung transplant.  Sternal sutures are intact and aligned.  Clinical considerations will determine if fluoroscopic sniff test is warranted for assessment of the LEFT hemidiaphragm.       Electronically signed by: Sena Baum MD  Date:     10/10/17  Time:    11:57        Assessment:  1. Encounter following organ transplant    2. Lung transplanted    3. Immunosuppression    4. Prophylactic antibiotic    5. AVILA (acute kidney injury)      Plan:   1. Will continue to monitor his pulmonary functions every two weeks  for now. His acute rejection does not see to have affected them and they are improving as expected. Oxygenation and CXR are stable.     2.  Continue tacrolimus, mycophenolate, and prednisone. Adjust tacrolimus as needed.    3. Continue Bactrim, valganciclovir, and fluconazole.    4. Adjust tacrolimus and encourage hydration for his elevated creatinine.    5. RTC in 2 weeks

## 2017-10-12 ENCOUNTER — PATIENT MESSAGE (OUTPATIENT)
Dept: TRANSPLANT | Facility: CLINIC | Age: 57
End: 2017-10-12

## 2017-10-16 ENCOUNTER — LAB VISIT (OUTPATIENT)
Dept: LAB | Facility: HOSPITAL | Age: 57
End: 2017-10-16
Attending: INTERNAL MEDICINE
Payer: COMMERCIAL

## 2017-10-16 DIAGNOSIS — Z94.2 LUNG REPLACED BY TRANSPLANT: ICD-10-CM

## 2017-10-16 LAB
ANION GAP SERPL CALC-SCNC: 15 MMOL/L
BUN SERPL-MCNC: 26 MG/DL
CALCIUM SERPL-MCNC: 9.6 MG/DL
CHLORIDE SERPL-SCNC: 94 MMOL/L
CO2 SERPL-SCNC: 27 MMOL/L
CREAT SERPL-MCNC: 1 MG/DL
EST. GFR  (AFRICAN AMERICAN): >60 ML/MIN/1.73 M^2
EST. GFR  (NON AFRICAN AMERICAN): >60 ML/MIN/1.73 M^2
GLUCOSE SERPL-MCNC: 246 MG/DL
POTASSIUM SERPL-SCNC: 4.2 MMOL/L
SODIUM SERPL-SCNC: 136 MMOL/L

## 2017-10-16 PROCEDURE — 80048 BASIC METABOLIC PNL TOTAL CA: CPT

## 2017-10-16 PROCEDURE — 36415 COLL VENOUS BLD VENIPUNCTURE: CPT | Mod: PO

## 2017-10-16 PROCEDURE — 80197 ASSAY OF TACROLIMUS: CPT

## 2017-10-17 ENCOUNTER — PATIENT MESSAGE (OUTPATIENT)
Dept: TRANSPLANT | Facility: CLINIC | Age: 57
End: 2017-10-17

## 2017-10-17 DIAGNOSIS — Z94.2 LUNG REPLACED BY TRANSPLANT: ICD-10-CM

## 2017-10-17 LAB — TACROLIMUS BLD-MCNC: 6.9 NG/ML

## 2017-10-17 RX ORDER — TACROLIMUS 0.5 MG/1
1 CAPSULE ORAL EVERY 12 HOURS
Qty: 120 CAPSULE | Refills: 11 | Status: SHIPPED | OUTPATIENT
Start: 2017-10-17 | End: 2017-10-26 | Stop reason: SDUPTHER

## 2017-10-17 NOTE — TELEPHONE ENCOUNTER
----- Message from Darrick Wolfe MD sent at 10/17/2017 10:21 AM CDT -----  He's gonna have to go back to 1 bid. He will need to hydrate well to avoid kidney injury.

## 2017-10-17 NOTE — TELEPHONE ENCOUNTER
Received written orders from Dr. Wolfe to instruct patient to increase prograf to 1 mg bid from 0.5 mg bid.  My Chart message sent to patient notifying pt of the above change.  Patient will have repeat labs at his next clinic visit.  Asked patient to respond to confirm receipt of the above change.  Coordinator will follow my chart message with a phone call if no response is received.

## 2017-10-24 LAB
ACID FAST MOD KINY STN SPEC: NORMAL
MYCOBACTERIUM SPEC QL CULT: NORMAL

## 2017-10-25 LAB
ACID FAST MOD KINY STN SPEC: NORMAL
MYCOBACTERIUM SPEC QL CULT: NORMAL

## 2017-10-26 ENCOUNTER — HOSPITAL ENCOUNTER (OUTPATIENT)
Dept: PULMONOLOGY | Facility: CLINIC | Age: 57
Discharge: HOME | End: 2017-10-26
Payer: COMMERCIAL

## 2017-10-26 ENCOUNTER — OFFICE VISIT (OUTPATIENT)
Dept: TRANSPLANT | Facility: CLINIC | Age: 57
End: 2017-10-26
Payer: COMMERCIAL

## 2017-10-26 ENCOUNTER — PATIENT MESSAGE (OUTPATIENT)
Dept: TRANSPLANT | Facility: CLINIC | Age: 57
End: 2017-10-26

## 2017-10-26 ENCOUNTER — HOSPITAL ENCOUNTER (OUTPATIENT)
Dept: RADIOLOGY | Facility: HOSPITAL | Age: 57
Discharge: HOME | End: 2017-10-26
Attending: INTERNAL MEDICINE
Payer: COMMERCIAL

## 2017-10-26 VITALS
OXYGEN SATURATION: 98 % | HEIGHT: 70 IN | TEMPERATURE: 97 F | RESPIRATION RATE: 20 BRPM | DIASTOLIC BLOOD PRESSURE: 80 MMHG | WEIGHT: 196 LBS | SYSTOLIC BLOOD PRESSURE: 140 MMHG | HEART RATE: 96 BPM | BODY MASS INDEX: 28.06 KG/M2

## 2017-10-26 DIAGNOSIS — J32.9 SINUSITIS, UNSPECIFIED CHRONICITY, UNSPECIFIED LOCATION: ICD-10-CM

## 2017-10-26 DIAGNOSIS — Z94.2 LUNG REPLACED BY TRANSPLANT: ICD-10-CM

## 2017-10-26 DIAGNOSIS — D84.9 IMMUNOSUPPRESSION: ICD-10-CM

## 2017-10-26 DIAGNOSIS — Z94.2 LUNG TRANSPLANTED: ICD-10-CM

## 2017-10-26 DIAGNOSIS — T86.819 COMPLICATION OF TRANSPLANTED LUNG, UNSPECIFIED COMPLICATION: Primary | ICD-10-CM

## 2017-10-26 DIAGNOSIS — Z48.298 ENCOUNTER FOLLOWING ORGAN TRANSPLANT: Primary | ICD-10-CM

## 2017-10-26 DIAGNOSIS — Z79.2 PROPHYLACTIC ANTIBIOTIC: ICD-10-CM

## 2017-10-26 LAB
PRE FEV1 FVC: 89
PRE FEV1: 2.41
PRE FVC: 2.71
PREDICTED FEV1 FVC: 81
PREDICTED FEV1: 3.74
PREDICTED FVC: 4.6

## 2017-10-26 PROCEDURE — 71020 XR CHEST PA AND LATERAL: CPT | Mod: 26,,, | Performed by: RADIOLOGY

## 2017-10-26 PROCEDURE — 94010 BREATHING CAPACITY TEST: CPT | Mod: S$GLB,,, | Performed by: INTERNAL MEDICINE

## 2017-10-26 PROCEDURE — 71020 XR CHEST PA AND LATERAL: CPT | Mod: TC

## 2017-10-26 PROCEDURE — 99999 PR PBB SHADOW E&M-EST. PATIENT-LVL III: CPT | Mod: PBBFAC,,, | Performed by: INTERNAL MEDICINE

## 2017-10-26 PROCEDURE — 99214 OFFICE O/P EST MOD 30 MIN: CPT | Mod: 25,S$GLB,, | Performed by: INTERNAL MEDICINE

## 2017-10-26 RX ORDER — FLUTICASONE PROPIONATE 50 MCG
1 SPRAY, SUSPENSION (ML) NASAL DAILY
Qty: 1 BOTTLE | Refills: 3 | Status: SHIPPED | OUTPATIENT
Start: 2017-10-26 | End: 2020-07-21

## 2017-10-26 RX ORDER — TACROLIMUS 0.5 MG/1
1 CAPSULE ORAL EVERY 12 HOURS
Qty: 120 CAPSULE | Refills: 11 | Status: SHIPPED | OUTPATIENT
Start: 2017-10-26 | End: 2017-11-14 | Stop reason: SDUPTHER

## 2017-10-26 NOTE — TELEPHONE ENCOUNTER
----- Message from Darrick Wolfe MD sent at 10/26/2017  2:11 PM CDT -----  He needs to be seen by endocrine.   ----- Message -----  From: Danielle BRICEÑO Do RN  Sent: 10/26/2017   2:05 PM  To: Darrick Wolfe MD    I'm assuming it's the steroids?  The big jump started with the pulse steroids and is slooowly coming down.  His numbers today were fasting.  Would you like to further evaluate with an A1C or refer him to endocrine?   ----- Message -----  From: Darrick Wolfe MD  Sent: 10/26/2017   2:01 PM  To: Danielle BRICEÑO Do, RN    Increase as previously instructed. What is going on with his blood sugar control?

## 2017-10-26 NOTE — TELEPHONE ENCOUNTER
Received written orders from Dr. Wolfe instructing patient to increased prograf to 1 mg bid from 0.5 mg bid.  Dr. Wolfe is also referring the patient to see endocrine for elevated blood sugars.      Contacted patient and spoke to him via telephone.  Patient was instructed to increase prograf to 1 mg bid and to have repeat labs on Wednesday 11/1/17 when he comes for his appt to see Dr. Veliz.  Patient was reminded to hold his morning dose of prograf until after his labs have been drawn.      Informed patient of his bronchoscopy that is scheduled for Thursday Nov 2, 2017 at 8 am.  Patient was instructed to arrive in [facility] for 06:30.  Fasting instructions were reviewed and patient was notified to have someone drive him home after the procedure.      Notified patient that we will refer him to see endocrine for his elevated blood glucose.  Patient agreed and asked to see someone here.      Patient noted the changes and verbalized understanding of all of the above.  My Chart message was also sent to patient as a reference.

## 2017-10-27 ENCOUNTER — PATIENT MESSAGE (OUTPATIENT)
Dept: TRANSPLANT | Facility: CLINIC | Age: 57
End: 2017-10-27

## 2017-10-27 DIAGNOSIS — Z94.2 LUNG REPLACED BY TRANSPLANT: Primary | ICD-10-CM

## 2017-10-28 NOTE — PROGRESS NOTES
LUNG TRANSPLANT RECIPIENT FOLLOW-UP    Reason for Visit: Follow-up after lung transplantation.                                                                                                         Date of Transplant: 8/22/17                                                                               Reason for Transplant: Sarcoidosis with pulmonary hypertension                                                                               Type of Transplant: bilateral sequential lung                                                                               CMV Status: D+ / R-                                                                               Major Complications:   1. Left vocal cord dysfunction                                                                               History of Present Illness: Martin Hendrix Jr. is a 56 y.o. year old male with a transplant history as outlined above who presents today for his routine visit after bilateral lung transplant. He says that he feels well. He denies shortness of breath or cough.  No problems with appetite. No issues with bowel movements. Tolerating his medications well. Swelling if his legs has improved with as needed furosemide. Has been having sinus congestion with the change of the weather.    He was diagnosed with A1-A2 rejection after his first surveillance bronchoscopy and will have his follow up bronchoscopy next week.     Review of Systems   Constitutional: Negative for chills, diaphoresis, fever, malaise/fatigue and weight loss.   HENT: Positive for congestion. Negative for ear discharge, ear pain, hearing loss, nosebleeds and sore throat.    Eyes: Negative for blurred vision, double vision and photophobia.   Respiratory: Negative for cough, hemoptysis, sputum production, shortness of breath and wheezing.    Cardiovascular: Negative for chest pain, palpitations, orthopnea, claudication, leg swelling (minimal with prn furosemide) and PND.  "  Gastrointestinal: Negative for abdominal pain, blood in stool, constipation, diarrhea, heartburn, melena, nausea and vomiting.   Genitourinary: Negative for dysuria, flank pain, frequency, hematuria and urgency.   Musculoskeletal: Negative for back pain, falls, joint pain, myalgias and neck pain.   Skin: Negative for itching and rash.   Neurological: Negative for dizziness, tremors, sensory change, loss of consciousness, weakness and headaches.   Endo/Heme/Allergies: Does not bruise/bleed easily.   Psychiatric/Behavioral: Negative for depression, hallucinations and memory loss. The patient is not nervous/anxious and does not have insomnia.      BP (!) 140/80 (BP Location: Left arm, Patient Position: Sitting, BP Method: Large (Automatic))   Pulse 96   Temp 97.2 °F (36.2 °C)   Resp 20   Ht 5' 10" (1.778 m)   Wt 88.9 kg (196 lb)   SpO2 98%   BMI 28.12 kg/m²     Physical Exam   Constitutional: He is oriented to person, place, and time and well-developed, well-nourished, and in no distress. No distress.   HENT:   Head: Normocephalic.   Nose: Nose normal.   Mouth/Throat: Oropharynx is clear and moist. No oropharyngeal exudate.   Eyes: Conjunctivae and EOM are normal. Pupils are equal, round, and reactive to light. No scleral icterus.   Neck: Normal range of motion. Neck supple. No JVD present. No tracheal deviation present. No thyromegaly present.   Cardiovascular: Normal rate, regular rhythm and intact distal pulses.  Exam reveals no gallop and no friction rub.    No murmur heard.  Pulmonary/Chest: Effort normal. No stridor. No respiratory distress. He has no wheezes. He has no rales. He exhibits no tenderness.   Abdominal: Soft. Bowel sounds are normal. He exhibits no distension and no mass. There is no tenderness. There is no rebound and no guarding.   Musculoskeletal: Normal range of motion. He exhibits no edema.   Lymphadenopathy:     He has no cervical adenopathy.   Neurological: He is alert and oriented to " person, place, and time. No cranial nerve deficit. Gait normal. Coordination normal.   Skin: Skin is warm and dry. No rash noted. He is not diaphoretic. No erythema. No pallor.   Psychiatric: Mood and affect normal.     Labs:  cbc, cmp, tacrolimus Latest Ref Rng & Units 10/10/2017 10/16/2017 10/26/2017   TACROLIMUS LVL 5.0 - 15.0 ng/mL 11.2 6.9 6.7   WHITE BLOOD CELL COUNT 3.90 - 12.70 K/uL 17.16(H) - 15.54(H)   RBC 4.60 - 6.20 M/uL 4.32(L) - 3.88(L)   HEMOGLOBIN 14.0 - 18.0 g/dL 13.0(L) - 11.6(L)   HEMATOCRIT 40.0 - 54.0 % 38.4(L) - 35.1(L)   MCV 82 - 98 fL 89 - 91   MCH 27.0 - 31.0 pg 30.1 - 29.9   MCHC 32.0 - 36.0 g/dL 33.9 - 33.0   RDW 11.5 - 14.5 % 13.4 - 13.4   PLATELETS 150 - 350 K/uL 402(H) - 343   MPV 9.2 - 12.9 fL 9.1(L) - 8.9(L)   GRAN # 1.8 - 7.7 K/uL 16.1(H) - 12.0(H)   LYMPH # 1.0 - 4.8 K/uL 0.7(L) - 1.6   MONO # 0.3 - 1.0 K/uL 0.2(L) - 1.0   EOSINOPHIL% 0.0 - 8.0 % 0.0 - 0.3   BASOPHIL% 0.0 - 1.9 % 0.1 - 0.7   DIFFERENTIAL METHOD - Automated - Automated   SODIUM 136 - 145 mmol/L 135(L) 136 137   POTASSIUM 3.5 - 5.1 mmol/L 4.8 4.2 4.2   CHLORIDE 95 - 110 mmol/L 97 94(L) 98   CO2 23 - 29 mmol/L 23 27 31(H)   GLUCOSE 70 - 110 mg/dL 439(H) 246(H) 226(H)   BUN BLD 6 - 20 mg/dL 48(H) 26(H) 18   CREATININE 0.5 - 1.4 mg/dL 1.7(H) 1.0 0.9   CALCIUM 8.7 - 10.5 mg/dL 9.9 9.6 9.7   PROTEIN TOTAL 6.0 - 8.4 g/dL 7.7 - 6.3   ALBUMIN 3.5 - 5.2 g/dL 3.9 - 3.4(L)   BILIRUBIN TOTAL 0.1 - 1.0 mg/dL 0.4 - 0.5   ALK PHOS 55 - 135 U/L 111 - 89   AST 10 - 40 U/L 18 - 17   ALT 10 - 44 U/L 30 - 31   ANION GAP 8 - 16 mmol/L 15 15 8   EGFR IF AFRICAN AMERICAN >60 mL/min/1.73 m:2 51.7(A) >60.0 >60.0   EGFR IF NON-AFRICAN AMERICAN >60 mL/min/1.73 m:2 44.7(A) >60.0 >60.0     Pulmonary Function Tests 10/26/2017 10/10/2017 9/26/2017 9/19/2017 9/11/2017 9/5/2017 8/16/2017   FVC 2.71 2.84 2.54 2.36 2.29 2.11 2.03   FEV1 2.41 2.57 2.26 2.13 1.97 1.8 1.2   TLC (liters) - - - - - - -   DLCO (ml/mmHg sec) - - - - - - 14.3   FVC% 59 61 53  51 49 45 44   FEV1% 64 69 58 57 53 48 32   FEF 25-75 2.73 3.26 2.69 2.85 2.26 1.86 0.58   FEF 25-75% 71 85 68 74 59 49 15   TLC% - - - - - - -   RV - - - - - - -   RV% - - - - - - -   DLCO% - - - - - - 51       Imaging:  Results for orders placed during the hospital encounter of 10/26/17   X-Ray Chest PA And Lateral    Narrative Time of Procedure: 10/26/17 08:11:58  Accession # [id number]    Comparison:   Pre-operative: Chest radiograph 08/21/2017, 04/24/2017   Post operative: Chest radiograph 10/10/2017, 10/04/2017, 09/19/2017, 09/05/2017, 08/27/2017, 08/22/2017,     Number of views: 2.     Findings:  Sternotomy wires are midline and intact.  There is mild elevation of the LEFT hemidiaphragm similar to prior.        Lung volumes are lower normal limits and symmetric.  Mediastinal structures are midline.    There is stable modest amount of soft tissue attenuation at the margin of the hemithoraces on PA chest radiograph and in the left posterior costophrenic angle on lateral view consistent with a small amount of pleural fluid.  Expected finding in light of the history of lung transplant.    There are stable calcified mediastinal lymph nodes.    We detect no cardiac decompensation, pneumothorax, or pneumomediastinum.  Stable bone islands projecting over left clavicular, unchanged from prior.    Impression Postoperative changes of bilateral lung transplants with no significant change from previous chest radiograph.  ______________________________________     Electronically signed by resident: MONTRELL REYNAGA  Date:     10/26/17  Time:    08:53            As the supervising and teaching physician, I personally reviewed the images and resident's interpretation and I agree with the findings.          Electronically signed by: Sena Baum MD  Date:     10/26/17  Time:    09:24          Assessment:  1. Encounter following organ transplant    2. Lung transplanted    3. Immunosuppression    4. Prophylactic antibiotic    5.  Sinusitis, unspecified chronicity, unspecified location      Plan:   1. Will continue to monitor his pulmonary functions every two weeks  for now. There has been a slight decrease in his FEV1 and will re-evaluate next week with bronchoscopy.    2. Continue tacrolimus, mycophenolate, and prednisone. Adjust tacrolimus as needed.    3. Continue Bactrim, valganciclovir, and fluconazole.    4. Nasal fluticasone prescribed for his sinus congestion.     5. RTC in 2 weeks

## 2017-10-30 ENCOUNTER — TELEPHONE (OUTPATIENT)
Dept: TRANSPLANT | Facility: CLINIC | Age: 57
End: 2017-10-30

## 2017-10-30 NOTE — TELEPHONE ENCOUNTER
----- Message from Star Pollard sent at 10/30/2017  8:32 AM CDT -----  Contact: PT  Please call patient regarding his appts, he has a few questions.     Call  or

## 2017-11-01 ENCOUNTER — OFFICE VISIT (OUTPATIENT)
Dept: OTOLARYNGOLOGY | Facility: CLINIC | Age: 57
DRG: 205 | End: 2017-11-01
Payer: COMMERCIAL

## 2017-11-01 ENCOUNTER — PATIENT MESSAGE (OUTPATIENT)
Dept: TRANSPLANT | Facility: CLINIC | Age: 57
End: 2017-11-01

## 2017-11-01 VITALS
DIASTOLIC BLOOD PRESSURE: 81 MMHG | BODY MASS INDEX: 27.96 KG/M2 | TEMPERATURE: 98 F | WEIGHT: 194.88 LBS | SYSTOLIC BLOOD PRESSURE: 134 MMHG | HEART RATE: 93 BPM

## 2017-11-01 DIAGNOSIS — J38.01 UNILATERAL COMPLETE VOCAL FOLD PARALYSIS: Primary | ICD-10-CM

## 2017-11-01 DIAGNOSIS — R49.0 DYSPHONIA: ICD-10-CM

## 2017-11-01 PROCEDURE — 31579 LARYNGOSCOPY TELESCOPIC: CPT | Mod: S$GLB,,, | Performed by: OTOLARYNGOLOGY

## 2017-11-01 PROCEDURE — 99213 OFFICE O/P EST LOW 20 MIN: CPT | Mod: 25,S$GLB,, | Performed by: OTOLARYNGOLOGY

## 2017-11-01 PROCEDURE — 99999 PR PBB SHADOW E&M-EST. PATIENT-LVL III: CPT | Mod: PBBFAC,,, | Performed by: OTOLARYNGOLOGY

## 2017-11-01 NOTE — PROGRESS NOTES
[facility]  Department of Otorhinolaryngology and Communication Sciences    Subjective:      Martin Hendrix Jr. is a 56 y.o. male who presents for follow-up. He has  left vocal fold paralysis following lung transplant 8/2017.    He underwent injection augmentation about 1 month ago. His voice has made significant improvements since the procedure. He notes improved strength and projection, though still with some mild limitations.    TREATMENT HISTORY:  9/29/2017 - Left VF injection augmentation - HA    Voice Handicap Index - 10 (VHI-10) score is 7.  Reflux symptom Index (RSI) score is 7.  Eating Assessment Tool - 10 (EAT-10) score is 0.  Dyspnea Index (DI) score is 0.  Cough Severity Index (CSI) score is 0.    The patient's medications, allergies, past medical, surgical, social and family histories were reviewed and updated as appropriate.    A detailed review of systems was obtained with pertinent positives as per the above HPI, and otherwise negative.      Objective:     VS reviewed, per RN/MA notes    Constitutional: comfortable, well dressed  Psychiatric: appropriate affect  Respiratory: comfortably breathing, symmetric chest rise, no stridor  Voice: mild inconsistent breathiness and asthenia  Head: normocephalic  Eyes: conjunctivae and sclerae clear  Indirect laryngoscopy is limited due to gag    Procedure  Flexible Laryngoscopy (49304): Laryngoscopy is indicated for assessment of upper aerodigestive structure and function. This was carried out transnasally with a distal chip videoendoscope. After verbal consent was obtained, the patient was positioned and the nose was topically decongested with 1% phenylephrine and topically anesthetized with 4% lidocaine. The endoscope was passed through the most patent nasal cavity and positioned to image the nasopharynx, larynx, and hypopharynx in detail. The following featured were examined: nasopharyngeal, laryngeal, hypopharyngeal masses; velopharyngeal  strength, closure, and symmetry of motion; vocal fold range and symmetry of motion; laryngeal mucosal edema, erythema, inflammation, and hydration; salivary pooling; and gross laryngeal sensation. The equipment was removed. The patient tolerated the procedure well without complication. All findings were normal except:  - left vocal fold immobile, paramedian; interval improvement in contour/support  - complete glottal closure at modal frequencies, touch glottal closure at low frequencies and minimal insufficiency at high frequencies  - negligible posterior gap; minimal vertical height mismatch  - decrease in compensatory supraglottic hyperfunction        Assessment:     Martin Hendrix Jr. is a 56 y.o. male with  left vocal fold paralysis following lung transplant 8/2017. He has made appropriate progress following injection augmentation.     Plan:     Reassurance was provided. He will follow up with me in 4 months.    All questions were answered, and the patient is in agreement with the plan.    [place] M.D.  [facility]  Department of Otorhinolaryngology and Communication Sciences

## 2017-11-02 ENCOUNTER — HOSPITAL ENCOUNTER (OUTPATIENT)
Facility: HOSPITAL | Age: 57
Discharge: HOME | End: 2017-11-02
Attending: INTERNAL MEDICINE | Admitting: INTERNAL MEDICINE
Payer: COMMERCIAL

## 2017-11-02 ENCOUNTER — SURGERY (OUTPATIENT)
Age: 57
End: 2017-11-02

## 2017-11-02 VITALS
HEIGHT: 71 IN | RESPIRATION RATE: 19 BRPM | OXYGEN SATURATION: 99 % | HEART RATE: 76 BPM | WEIGHT: 195 LBS | DIASTOLIC BLOOD PRESSURE: 75 MMHG | TEMPERATURE: 98 F | SYSTOLIC BLOOD PRESSURE: 112 MMHG | BODY MASS INDEX: 27.3 KG/M2

## 2017-11-02 DIAGNOSIS — T86.819 COMPLICATION OF TRANSPLANTED LUNG, UNSPECIFIED COMPLICATION: ICD-10-CM

## 2017-11-02 DIAGNOSIS — Z94.2 LUNG REPLACED BY TRANSPLANT: Primary | ICD-10-CM

## 2017-11-02 LAB
APPEARANCE FLD: CLEAR
BODY FLD TYPE: NORMAL
COLOR FLD: COLORLESS
LYMPHOCYTES NFR FLD MANUAL: 6 %
MONOS+MACROS NFR FLD MANUAL: 90 %
NEUTROPHILS NFR FLD MANUAL: 4 %
POCT GLUCOSE: 241 MG/DL (ref 70–110)
WBC # FLD: 111 /CU MM

## 2017-11-02 PROCEDURE — 87102 FUNGUS ISOLATION CULTURE: CPT

## 2017-11-02 PROCEDURE — 87186 SC STD MICRODIL/AGAR DIL: CPT | Mod: 59

## 2017-11-02 PROCEDURE — 88305 TISSUE EXAM BY PATHOLOGIST: CPT | Mod: 26,,, | Performed by: PATHOLOGY

## 2017-11-02 PROCEDURE — 31624 DX BRONCHOSCOPE/LAVAGE: CPT | Performed by: INTERNAL MEDICINE

## 2017-11-02 PROCEDURE — 87077 CULTURE AEROBIC IDENTIFY: CPT

## 2017-11-02 PROCEDURE — 87070 CULTURE OTHR SPECIMN AEROBIC: CPT

## 2017-11-02 PROCEDURE — 87205 SMEAR GRAM STAIN: CPT | Mod: 59

## 2017-11-02 PROCEDURE — 87015 SPECIMEN INFECT AGNT CONCNTJ: CPT

## 2017-11-02 PROCEDURE — 31623 DX BRONCHOSCOPE/BRUSH: CPT | Performed by: INTERNAL MEDICINE

## 2017-11-02 PROCEDURE — 99152 MOD SED SAME PHYS/QHP 5/>YRS: CPT | Performed by: INTERNAL MEDICINE

## 2017-11-02 PROCEDURE — 27201011 HC FORCEPS DISPOSABLE: Performed by: INTERNAL MEDICINE

## 2017-11-02 PROCEDURE — 88305 TISSUE EXAM BY PATHOLOGIST: CPT | Performed by: PATHOLOGY

## 2017-11-02 PROCEDURE — 88312 SPECIAL STAINS GROUP 1: CPT | Mod: 26,,, | Performed by: PATHOLOGY

## 2017-11-02 PROCEDURE — 87116 MYCOBACTERIA CULTURE: CPT | Mod: 59

## 2017-11-02 PROCEDURE — 31628 BRONCHOSCOPY/LUNG BX EACH: CPT | Mod: RT,,, | Performed by: INTERNAL MEDICINE

## 2017-11-02 PROCEDURE — 31624 DX BRONCHOSCOPE/LAVAGE: CPT | Mod: 59,RT,, | Performed by: INTERNAL MEDICINE

## 2017-11-02 PROCEDURE — 87305 ASPERGILLUS AG IA: CPT

## 2017-11-02 PROCEDURE — 87581 M.PNEUMON DNA AMP PROBE: CPT

## 2017-11-02 PROCEDURE — 87107 FUNGI IDENTIFICATION MOLD: CPT

## 2017-11-02 PROCEDURE — 31623 DX BRONCHOSCOPE/BRUSH: CPT | Mod: 59,LT,, | Performed by: INTERNAL MEDICINE

## 2017-11-02 PROCEDURE — 88313 SPECIAL STAINS GROUP 2: CPT | Mod: 26,,, | Performed by: PATHOLOGY

## 2017-11-02 PROCEDURE — 63600175 PHARM REV CODE 636 W HCPCS: Performed by: INTERNAL MEDICINE

## 2017-11-02 PROCEDURE — 89051 BODY FLUID CELL COUNT: CPT

## 2017-11-02 PROCEDURE — 99153 MOD SED SAME PHYS/QHP EA: CPT | Performed by: INTERNAL MEDICINE

## 2017-11-02 PROCEDURE — 31628 BRONCHOSCOPY/LUNG BX EACH: CPT | Performed by: INTERNAL MEDICINE

## 2017-11-02 PROCEDURE — 25000003 PHARM REV CODE 250: Performed by: INTERNAL MEDICINE

## 2017-11-02 PROCEDURE — 82962 GLUCOSE BLOOD TEST: CPT

## 2017-11-02 RX ORDER — FENTANYL CITRATE 50 UG/ML
INJECTION, SOLUTION INTRAMUSCULAR; INTRAVENOUS CODE/TRAUMA/SEDATION MEDICATION
Status: COMPLETED | OUTPATIENT
Start: 2017-11-02 | End: 2017-11-02

## 2017-11-02 RX ORDER — MIDAZOLAM HYDROCHLORIDE 5 MG/ML
INJECTION INTRAMUSCULAR; INTRAVENOUS CODE/TRAUMA/SEDATION MEDICATION
Status: COMPLETED | OUTPATIENT
Start: 2017-11-02 | End: 2017-11-02

## 2017-11-02 RX ORDER — LIDOCAINE HYDROCHLORIDE 10 MG/ML
INJECTION INFILTRATION; PERINEURAL CODE/TRAUMA/SEDATION MEDICATION
Status: COMPLETED | OUTPATIENT
Start: 2017-11-02 | End: 2017-11-02

## 2017-11-02 RX ORDER — LIDOCAINE HYDROCHLORIDE 20 MG/ML
INJECTION, SOLUTION INFILTRATION; PERINEURAL CODE/TRAUMA/SEDATION MEDICATION
Status: COMPLETED | OUTPATIENT
Start: 2017-11-02 | End: 2017-11-02

## 2017-11-02 RX ADMIN — FENTANYL CITRATE 100 MCG: 50 INJECTION, SOLUTION INTRAMUSCULAR; INTRAVENOUS at 07:11

## 2017-11-02 RX ADMIN — LIDOCAINE HYDROCHLORIDE 4 ML: 20 INJECTION, SOLUTION INFILTRATION; PERINEURAL at 08:11

## 2017-11-02 RX ADMIN — LIDOCAINE HYDROCHLORIDE 8 ML: 10 INJECTION, SOLUTION INFILTRATION; PERINEURAL at 08:11

## 2017-11-02 RX ADMIN — TOPICAL ANESTHETIC 0.5 ML: 200 SPRAY DENTAL; PERIODONTAL at 07:11

## 2017-11-02 RX ADMIN — MIDAZOLAM 4 MG: 5 INJECTION INTRAMUSCULAR; INTRAVENOUS at 07:11

## 2017-11-02 NOTE — SEDATION DOCUMENTATION
H and P updated-yes, patient placed on cardiac monitor   Anesthesia Plan:  conscious sedation   ASA verified-yes  Airway exam performed-yes  Personal or Family history of anesthesia complications-No  Consent signed and witnessed, Any Kraus RN

## 2017-11-02 NOTE — SEDATION DOCUMENTATION
Moderate concious sedation was performed and cardiorespiratory functions were monitored the entire procedure by Any Kraus RN.  Sedation began at 758am  and concluded at 832am.

## 2017-11-02 NOTE — DISCHARGE SUMMARY
[facility]  Lung Transplant  Discharge Summary      Patient Name: Martin Hendrix Jr.  MRN: [id number]  Admission Date: 11/2/2017  Hospital Length of Stay: 0 days  Discharge Date and Time: 11/02/2017 8:32 AM  Attending Physician: Darrick Wolfe MD   Discharging Provider: Darrick Wolfe MD  Primary Care Provider: Sukhi Dunham Jr, MD     HPI: Mr. Hendrix was admitted for surveillance bronchoscopy after being treated for acute rejection.    Procedure(s) (LRB):  flexible bronchoscopy with tissue biopsy CPT 44030 (N/A)     Hospital Course: Bronchoscopy was performed without complications.        Significant Diagnostic Studies: Bronchoscopy: see separate report    Pending Diagnostic Studies:     Procedure Component Value Units Date/Time    X-Ray Chest AP Single View [[id number]]     Order Status:  Sent Lab Status:  No result         Final Active Diagnoses:    Diagnosis Date Noted POA    PRINCIPAL PROBLEM:  Complication of transplanted lung [T86.819] 11/02/2017 Yes      Problems Resolved During this Admission:    Diagnosis Date Noted Date Resolved POA      Discharged Condition: good    Disposition: Home or Self Care    Medications:  Reconciled Home Medications:   Current Discharge Medication List      CONTINUE these medications which have NOT CHANGED    Details   ACCU-CHEK DEANNE PLUS METER Misc USE AS DIRECTED  Refills: 0      ACCU-CHEK SOFTCLIX LANCETS Misc CHECK BLOOD GLUCOSE ONCE QD  Refills: 5      amlodipine (NORVASC) 5 MG tablet Take 1 tablet (5 mg total) by mouth once daily.  Qty: 30 tablet, Refills: 11    Associated Diagnoses: Essential hypertension      aspirin (ECOTRIN) 81 MG EC tablet Take 1 tablet (81 mg total) by mouth once daily.  Qty: 30 tablet, Refills: 11      calcium-vitamin D tablet 600 mg-200 units Take 1 tablet by mouth 2 (two) times daily.  Qty: 60 tablet, Refills: 11      famotidine (PEPCID) 20 MG tablet Take 1 tablet (20 mg total) by mouth 2 (two) times daily.  Qty: 60  tablet, Refills: 11      fluconazole (DIFLUCAN) 200 MG Tab Take 2 tablets (400 mg total) by mouth once daily.  Qty: 60 tablet, Refills: 6      fluticasone (FLONASE) 50 mcg/actuation nasal spray 1 spray by Each Nare route once daily.  Qty: 1 Bottle, Refills: 3    Associated Diagnoses: Sinusitis, unspecified chronicity, unspecified location      folic acid (FOLVITE) 1 MG tablet Take 1 tablet (1 mg total) by mouth once daily.  Qty: 30 tablet, Refills: 11      furosemide (LASIX) 40 MG tablet Take 1 tablet (40 mg total) by mouth once daily.  Qty: 30 tablet, Refills: 11    Associated Diagnoses: Edema of both legs      guaifenesin (MUCINEX) 600 mg 12 hr tablet Take 1 tablet (600 mg total) by mouth 2 (two) times daily.  Qty: 60 tablet, Refills: 11      magnesium oxide (MAG-OX) 400 mg tablet Take 1 tablet (400 mg total) by mouth 2 (two) times daily.  Qty: 60 tablet, Refills: 11      multivitamin (THERAGRAN) per tablet Take 1 tablet by mouth once daily.      mycophenolate (CELLCEPT) 250 mg Cap Take 2 capsules (500 mg total) by mouth 2 (two) times daily.  Qty: 120 capsule, Refills: 11      predniSONE (DELTASONE) 10 MG tablet Take by mouth. Taper:  30 mg x 21 d, 20 mg x 60 d, 15 mg x 30 d, 10 mg x 60 d, then 5 mg daily.  Qty: 100 tablet, Refills: 11      sulfamethoxazole-trimethoprim 800-160mg (BACTRIM DS) 800-160 mg Tab Take 1 tablet by mouth every Mon, Wed, Fri.  Qty: 15 tablet, Refills: 11      tacrolimus (PROGRAF) 0.5 MG Cap Take 2 capsules (1 mg total) by mouth every 12 (twelve) hours.  Qty: 120 capsule, Refills: 11    Associated Diagnoses: Lung replaced by transplant      valganciclovir (VALCYTE) 450 mg Tab Take 1 tablet (450 mg total) by mouth 2 (two) times daily.  Qty: 60 tablet, Refills: 11      oxycodone-acetaminophen (PERCOCET) 7.5-325 mg per tablet Take 1 tablet by mouth every 4 (four) hours as needed.  Qty: 40 tablet, Refills: 0           Patient Instructions:     Diet general     Activity as tolerated     Call MD  for:  temperature >101     Call MD for:  coughing up blood greater than 3 tablespoons in volume     Call MD for:  chest pain     Call MD for:  difficulty breathing or shortness of breath     Call MD for:  development of yellow/green sputum       Follow Up:  Follow-up Information     Darrick Wolfe MD.    Specialty:  Transplant  Why:  As needed  Contact information:  [address]  [facility] [id number]  [phone]                   Darrick Wolfe MD  Lung Transplant  [facility]

## 2017-11-02 NOTE — H&P (VIEW-ONLY)
LUNG TRANSPLANT RECIPIENT FOLLOW-UP    Reason for Visit: Follow-up after lung transplantation.                                                                                                         Date of Transplant: 8/22/17                                                                               Reason for Transplant: Sarcoidosis with pulmonary hypertension                                                                               Type of Transplant: bilateral sequential lung                                                                               CMV Status: D+ / R-                                                                               Major Complications:   1. Left vocal cord dysfunction                                                                               History of Present Illness: Martin Hendrix Jr. is a 56 y.o. year old male with a transplant history as outlined above who presents today for his routine visit after bilateral lung transplant. He says that he feels well. He denies shortness of breath or cough.  No problems with appetite. No issues with bowel movements. Tolerating his medications well. Swelling if his legs has improved with as needed furosemide. Has been having sinus congestion with the change of the weather.    He was diagnosed with A1-A2 rejection after his first surveillance bronchoscopy and will have his follow up bronchoscopy next week.     Review of Systems   Constitutional: Negative for chills, diaphoresis, fever, malaise/fatigue and weight loss.   HENT: Positive for congestion. Negative for ear discharge, ear pain, hearing loss, nosebleeds and sore throat.    Eyes: Negative for blurred vision, double vision and photophobia.   Respiratory: Negative for cough, hemoptysis, sputum production, shortness of breath and wheezing.    Cardiovascular: Negative for chest pain, palpitations, orthopnea, claudication, leg swelling (minimal with prn furosemide) and PND.  "  Gastrointestinal: Negative for abdominal pain, blood in stool, constipation, diarrhea, heartburn, melena, nausea and vomiting.   Genitourinary: Negative for dysuria, flank pain, frequency, hematuria and urgency.   Musculoskeletal: Negative for back pain, falls, joint pain, myalgias and neck pain.   Skin: Negative for itching and rash.   Neurological: Negative for dizziness, tremors, sensory change, loss of consciousness, weakness and headaches.   Endo/Heme/Allergies: Does not bruise/bleed easily.   Psychiatric/Behavioral: Negative for depression, hallucinations and memory loss. The patient is not nervous/anxious and does not have insomnia.      BP (!) 140/80 (BP Location: Left arm, Patient Position: Sitting, BP Method: Large (Automatic))   Pulse 96   Temp 97.2 °F (36.2 °C)   Resp 20   Ht 5' 10" (1.778 m)   Wt 88.9 kg (196 lb)   SpO2 98%   BMI 28.12 kg/m²     Physical Exam   Constitutional: He is oriented to person, place, and time and well-developed, well-nourished, and in no distress. No distress.   HENT:   Head: Normocephalic.   Nose: Nose normal.   Mouth/Throat: Oropharynx is clear and moist. No oropharyngeal exudate.   Eyes: Conjunctivae and EOM are normal. Pupils are equal, round, and reactive to light. No scleral icterus.   Neck: Normal range of motion. Neck supple. No JVD present. No tracheal deviation present. No thyromegaly present.   Cardiovascular: Normal rate, regular rhythm and intact distal pulses.  Exam reveals no gallop and no friction rub.    No murmur heard.  Pulmonary/Chest: Effort normal. No stridor. No respiratory distress. He has no wheezes. He has no rales. He exhibits no tenderness.   Abdominal: Soft. Bowel sounds are normal. He exhibits no distension and no mass. There is no tenderness. There is no rebound and no guarding.   Musculoskeletal: Normal range of motion. He exhibits no edema.   Lymphadenopathy:     He has no cervical adenopathy.   Neurological: He is alert and oriented to " person, place, and time. No cranial nerve deficit. Gait normal. Coordination normal.   Skin: Skin is warm and dry. No rash noted. He is not diaphoretic. No erythema. No pallor.   Psychiatric: Mood and affect normal.     Labs:  cbc, cmp, tacrolimus Latest Ref Rng & Units 10/10/2017 10/16/2017 10/26/2017   TACROLIMUS LVL 5.0 - 15.0 ng/mL 11.2 6.9 6.7   WHITE BLOOD CELL COUNT 3.90 - 12.70 K/uL 17.16(H) - 15.54(H)   RBC 4.60 - 6.20 M/uL 4.32(L) - 3.88(L)   HEMOGLOBIN 14.0 - 18.0 g/dL 13.0(L) - 11.6(L)   HEMATOCRIT 40.0 - 54.0 % 38.4(L) - 35.1(L)   MCV 82 - 98 fL 89 - 91   MCH 27.0 - 31.0 pg 30.1 - 29.9   MCHC 32.0 - 36.0 g/dL 33.9 - 33.0   RDW 11.5 - 14.5 % 13.4 - 13.4   PLATELETS 150 - 350 K/uL 402(H) - 343   MPV 9.2 - 12.9 fL 9.1(L) - 8.9(L)   GRAN # 1.8 - 7.7 K/uL 16.1(H) - 12.0(H)   LYMPH # 1.0 - 4.8 K/uL 0.7(L) - 1.6   MONO # 0.3 - 1.0 K/uL 0.2(L) - 1.0   EOSINOPHIL% 0.0 - 8.0 % 0.0 - 0.3   BASOPHIL% 0.0 - 1.9 % 0.1 - 0.7   DIFFERENTIAL METHOD - Automated - Automated   SODIUM 136 - 145 mmol/L 135(L) 136 137   POTASSIUM 3.5 - 5.1 mmol/L 4.8 4.2 4.2   CHLORIDE 95 - 110 mmol/L 97 94(L) 98   CO2 23 - 29 mmol/L 23 27 31(H)   GLUCOSE 70 - 110 mg/dL 439(H) 246(H) 226(H)   BUN BLD 6 - 20 mg/dL 48(H) 26(H) 18   CREATININE 0.5 - 1.4 mg/dL 1.7(H) 1.0 0.9   CALCIUM 8.7 - 10.5 mg/dL 9.9 9.6 9.7   PROTEIN TOTAL 6.0 - 8.4 g/dL 7.7 - 6.3   ALBUMIN 3.5 - 5.2 g/dL 3.9 - 3.4(L)   BILIRUBIN TOTAL 0.1 - 1.0 mg/dL 0.4 - 0.5   ALK PHOS 55 - 135 U/L 111 - 89   AST 10 - 40 U/L 18 - 17   ALT 10 - 44 U/L 30 - 31   ANION GAP 8 - 16 mmol/L 15 15 8   EGFR IF AFRICAN AMERICAN >60 mL/min/1.73 m:2 51.7(A) >60.0 >60.0   EGFR IF NON-AFRICAN AMERICAN >60 mL/min/1.73 m:2 44.7(A) >60.0 >60.0     Pulmonary Function Tests 10/26/2017 10/10/2017 9/26/2017 9/19/2017 9/11/2017 9/5/2017 8/16/2017   FVC 2.71 2.84 2.54 2.36 2.29 2.11 2.03   FEV1 2.41 2.57 2.26 2.13 1.97 1.8 1.2   TLC (liters) - - - - - - -   DLCO (ml/mmHg sec) - - - - - - 14.3   FVC% 59 61 53  51 49 45 44   FEV1% 64 69 58 57 53 48 32   FEF 25-75 2.73 3.26 2.69 2.85 2.26 1.86 0.58   FEF 25-75% 71 85 68 74 59 49 15   TLC% - - - - - - -   RV - - - - - - -   RV% - - - - - - -   DLCO% - - - - - - 51       Imaging:  Results for orders placed during the hospital encounter of 10/26/17   X-Ray Chest PA And Lateral    Narrative Time of Procedure: 10/26/17 08:11:58  Accession # [id number]    Comparison:   Pre-operative: Chest radiograph 08/21/2017, 04/24/2017   Post operative: Chest radiograph 10/10/2017, 10/04/2017, 09/19/2017, 09/05/2017, 08/27/2017, 08/22/2017,     Number of views: 2.     Findings:  Sternotomy wires are midline and intact.  There is mild elevation of the LEFT hemidiaphragm similar to prior.        Lung volumes are lower normal limits and symmetric.  Mediastinal structures are midline.    There is stable modest amount of soft tissue attenuation at the margin of the hemithoraces on PA chest radiograph and in the left posterior costophrenic angle on lateral view consistent with a small amount of pleural fluid.  Expected finding in light of the history of lung transplant.    There are stable calcified mediastinal lymph nodes.    We detect no cardiac decompensation, pneumothorax, or pneumomediastinum.  Stable bone islands projecting over left clavicular, unchanged from prior.    Impression Postoperative changes of bilateral lung transplants with no significant change from previous chest radiograph.  ______________________________________     Electronically signed by resident: MONTRELL REYNAGA  Date:     10/26/17  Time:    08:53            As the supervising and teaching physician, I personally reviewed the images and resident's interpretation and I agree with the findings.          Electronically signed by: Sena Baum MD  Date:     10/26/17  Time:    09:24          Assessment:  1. Encounter following organ transplant    2. Lung transplanted    3. Immunosuppression    4. Prophylactic antibiotic    5.  Sinusitis, unspecified chronicity, unspecified location      Plan:   1. Will continue to monitor his pulmonary functions every two weeks  for now. There has been a slight decrease in his FEV1 and will re-evaluate next week with bronchoscopy.    2. Continue tacrolimus, mycophenolate, and prednisone. Adjust tacrolimus as needed.    3. Continue Bactrim, valganciclovir, and fluconazole.    4. Nasal fluticasone prescribed for his sinus congestion.     5. RTC in 2 weeks

## 2017-11-02 NOTE — PLAN OF CARE
Pt is AAox4. VSS NAD. IV discontinued. Gayle, NP verified order and patient to be discharged home. Discharge instructions given, verbalized understanding. Discharged home with family.

## 2017-11-02 NOTE — DISCHARGE INSTRUCTIONS
Direct Laryngoscopy with Bronchoscopy    Laryngoscopy and bronchoscopy are 2 procedures that may be done together. These allow the healthcare provider to see inside the air passages in the throat and lungs. A laryngoscopy looks at the throat and vocal cords. Bronchoscopy looks at the trachea (windpipe) and lungs. These procedures can be used to diagnose and treat certain problems. They can also be used to remove stuck objects. A tissue sample may be taken for testing (biopsy). And certain problems, like cysts or scarring, can be treated. Your healthcare provider will tell you more about your procedure based on why it is being done. This sheet gives you general information about what to expect.  Preparing for the procedure  Prepare for the procedure as you have been instructed. Be sure to tell your healthcare provider about all medicines you take. This includes over-the-counter drugs. It also includes herbs and other supplements. You may need to stop taking some or all of them before surgery. Your healthcare provider will tell you what to stop. Also, follow any directions youre given for not eating or drinking before surgery.  The day of the procedure  The procedure takes 30 to 60 minutes. Before the procedure begins:  · An IV line is put into a vein in your arm or hand. This line delivers fluids and medicines.  · To keep you free of pain, you will be given anesthesia. This may be sedation, which makes you relaxed and drowsy. Local anesthesia may also be injected or sprayed into your throat to numb it. If you are in the hospital, you may be given general anesthesia. This puts you in a state like deep sleep through the procedure.  During the procedure  Here is what to expect during the procedure:  · A tube with a light and a camera called a scope is used. The tube may be flexible or rigid. If a flexible scope is used, it is passed through your nostril. If the scope is rigid, it is put into your mouth and passed  down into the throat.  · The scope is moved through the air passages to the lungs. The scope sends live images from inside the air passages to a video screen. This lets the healthcare provider examine problems more closely.  · If needed, a biopsy is done using small tools put through the scope.  · If a growth is found, tools (including a laser) can be put through the scope to remove it.  After the procedure  You will be taken to a room to recover from the anesthesia. You will receive pain medicine. Your throat may feel numb or scratchy. Swallowing may feel strange at first. This will improve within a few hours. When you are released to go home, have an adult family member or friend ready to drive you.  Recovering at home  Once home, follow any instructions you have been given. These include:  · Take pain medicine as directed.  · Do not eat or drink until swallowing returns to normal. As soon as you can swallow comfortably, drink plenty of water.  · Use throat lozenges as advised by your healthcare provider to help ease throat soreness.  · Rest your voice as instructed by your healthcare provider.  When to call your healthcare provider  After you get home, call the healthcare provider if you have any of the following:  · Chest pain or trouble breathing (call 911 or other emergency service)  · Fever of 100.4°F (38°C) or higher, or as directed by your healthcare provider  · Trouble swallowing doesnt improve or gets worse  · Pain that does not go away even after taking pain medicine  · Severely hoarse voice  · Severe nausea or vomiting  · Bloody vomit  · Cough that brings up more than tiny specks of blood   Follow-up  Within a few weeks, you will receive test results. Your healthcare provider will discuss these with you on the phone or during a follow-up visit. Depending on what was found, you may need further evaluation and treatment.  Risks and possible complications  Risks of this procedure  include:  · Bleeding  · Infection  · Swelling of the throat  · Nosebleed (if the scope is passed through the nostril)  · Gagging  · Vomiting  · Cuts in the mouth, nose, or throat  · Injury to the teeth  · Vocal cord injury  · Breathing problems  · Pneumothorax (collapsed lung)  · Perforation of the pharynx  · Risks of anesthesia

## 2017-11-02 NOTE — PROGRESS NOTES
"Notified Any in pulmonary, pt ready from [facility] standpoint. Also notified pt's blood glucose 241 this morning, pt asymptomatic. Per pt, "sugars usually run in the 220s". Dr. Wolfe aware per Any. Will notifiy MD. No further instructions given at this time. Will proceed with procedure.   "

## 2017-11-03 ENCOUNTER — TELEPHONE (OUTPATIENT)
Dept: TRANSPLANT | Facility: CLINIC | Age: 57
End: 2017-11-03

## 2017-11-03 ENCOUNTER — HOSPITAL ENCOUNTER (INPATIENT)
Facility: HOSPITAL | Age: 57
LOS: 3 days | Discharge: HOME | DRG: 205 | End: 2017-11-06
Attending: INTERNAL MEDICINE | Admitting: INTERNAL MEDICINE
Payer: COMMERCIAL

## 2017-11-03 ENCOUNTER — PATIENT MESSAGE (OUTPATIENT)
Dept: TRANSPLANT | Facility: CLINIC | Age: 57
End: 2017-11-03

## 2017-11-03 DIAGNOSIS — J22 LRTI (LOWER RESPIRATORY TRACT INFECTION): ICD-10-CM

## 2017-11-03 DIAGNOSIS — T86.810 ACUTE REJECTION OF LUNG TRANSPLANT: ICD-10-CM

## 2017-11-03 DIAGNOSIS — T86.810 LUNG TRANSPLANT REJECTION: ICD-10-CM

## 2017-11-03 DIAGNOSIS — T38.0X5A STEROID-INDUCED HYPERGLYCEMIA: ICD-10-CM

## 2017-11-03 DIAGNOSIS — Z94.2 LUNG TRANSPLANTED: ICD-10-CM

## 2017-11-03 DIAGNOSIS — E11.65 TYPE 2 DIABETES MELLITUS WITH HYPERGLYCEMIA, WITHOUT LONG-TERM CURRENT USE OF INSULIN: ICD-10-CM

## 2017-11-03 DIAGNOSIS — R73.9 STEROID-INDUCED HYPERGLYCEMIA: ICD-10-CM

## 2017-11-03 DIAGNOSIS — T38.0X5A ADVERSE EFFECT OF CORTICOSTEROIDS, INITIAL ENCOUNTER: ICD-10-CM

## 2017-11-03 DIAGNOSIS — D84.9 IMMUNOSUPPRESSION: ICD-10-CM

## 2017-11-03 LAB
ENTEROVIRUS: NOT DETECTED
GALACTOMANNAN AG SPEC-ACNC: <0.5 INDEX
HUMAN BOCAVIRUS: NOT DETECTED
HUMAN CORONAVIRUS, COMMON COLD VIRUS: NOT DETECTED
INFLUENZA A - H1N1-09: NOT DETECTED
LEGIONELLA PNEUMOPHILA: NOT DETECTED
MORAXELLA CATARRHALIS: POSITIVE
PARAINFLUENZA: NOT DETECTED
POCT GLUCOSE: 321 MG/DL (ref 70–110)
RVP - ADENOVIRUS: NOT DETECTED
RVP - HUMAN METAPNEUMOVIRUS (HMPV): NOT DETECTED
RVP - INFLUENZA A: NOT DETECTED
RVP - INFLUENZA B: NOT DETECTED
RVP - RESPIRATORY SYNCTIAL VIRUS (RSV) A: NOT DETECTED
RVP - RESPIRATORY VIRAL PANEL, SOURCE: ABNORMAL
RVP - RHINOVIRUS: NOT DETECTED
TEM - ACINETOBACTER BAUMANNII: POSITIVE
TEM - BORDETELLA PERTUSSIS: NOT DETECTED
TEM - CHLAMYDOPHILA PNEUMONIAE: NOT DETECTED
TEM - KLEBSIELLA PNEUMONIAE: NOT DETECTED
TEM - MRSA: NOT DETECTED
TEM - MYCOPLASMA PNEUMONIAE: NOT DETECTED
TEM - NEISSERIA MENINGITIDIS: NOT DETECTED
TEM - PANTON-VALENTINE: NOT DETECTED
TEM - PSEUDOMONAS AERUGINOSA: NOT DETECTED
TEM - STAPHYLOCOCCUS AUREUS: NOT DETECTED
TEM - STREPTOCOCCUS PNEUMONIAE: NOT DETECTED
TEM - STREPTOCOCCUS PYOGENES A: NOT DETECTED
TEM- HAEMOPHILUS INFLUENZAE B: NOT DETECTED
TEM- HAEMOPHILUS INFLUENZAE: NOT DETECTED

## 2017-11-03 PROCEDURE — 25000003 PHARM REV CODE 250: Performed by: HOSPITALIST

## 2017-11-03 PROCEDURE — 25000003 PHARM REV CODE 250: Performed by: NURSE PRACTITIONER

## 2017-11-03 PROCEDURE — 63600175 PHARM REV CODE 636 W HCPCS: Performed by: NURSE PRACTITIONER

## 2017-11-03 PROCEDURE — 20600001 HC STEP DOWN PRIVATE ROOM

## 2017-11-03 RX ORDER — ONDANSETRON 2 MG/ML
8 INJECTION INTRAMUSCULAR; INTRAVENOUS DAILY PRN
Status: DISCONTINUED | OUTPATIENT
Start: 2017-11-03 | End: 2017-11-06 | Stop reason: HOSPADM

## 2017-11-03 RX ORDER — GLUCAGON 1 MG
1 KIT INJECTION
Status: DISCONTINUED | OUTPATIENT
Start: 2017-11-03 | End: 2017-11-04

## 2017-11-03 RX ORDER — SULFAMETHOXAZOLE AND TRIMETHOPRIM 800; 160 MG/1; MG/1
1 TABLET ORAL
Status: DISCONTINUED | OUTPATIENT
Start: 2017-11-06 | End: 2017-11-06 | Stop reason: HOSPADM

## 2017-11-03 RX ORDER — INSULIN ASPART 100 [IU]/ML
0-5 INJECTION, SOLUTION INTRAVENOUS; SUBCUTANEOUS
Status: DISCONTINUED | OUTPATIENT
Start: 2017-11-03 | End: 2017-11-04

## 2017-11-03 RX ORDER — VALGANCICLOVIR 450 MG/1
450 TABLET, FILM COATED ORAL 2 TIMES DAILY
Status: DISCONTINUED | OUTPATIENT
Start: 2017-11-03 | End: 2017-11-06 | Stop reason: HOSPADM

## 2017-11-03 RX ORDER — POTASSIUM CHLORIDE 20 MEQ/15ML
40 SOLUTION ORAL
Status: DISCONTINUED | OUTPATIENT
Start: 2017-11-03 | End: 2017-11-06 | Stop reason: HOSPADM

## 2017-11-03 RX ORDER — POTASSIUM CHLORIDE 20 MEQ/15ML
60 SOLUTION ORAL
Status: DISCONTINUED | OUTPATIENT
Start: 2017-11-03 | End: 2017-11-06 | Stop reason: HOSPADM

## 2017-11-03 RX ORDER — LANOLIN ALCOHOL/MO/W.PET/CERES
400 CREAM (GRAM) TOPICAL 2 TIMES DAILY
Status: DISCONTINUED | OUTPATIENT
Start: 2017-11-03 | End: 2017-11-06 | Stop reason: HOSPADM

## 2017-11-03 RX ORDER — AMLODIPINE BESYLATE 5 MG/1
5 TABLET ORAL NIGHTLY
Status: DISCONTINUED | OUTPATIENT
Start: 2017-11-03 | End: 2017-11-06 | Stop reason: HOSPADM

## 2017-11-03 RX ORDER — IBUPROFEN 200 MG
24 TABLET ORAL
Status: DISCONTINUED | OUTPATIENT
Start: 2017-11-03 | End: 2017-11-04

## 2017-11-03 RX ORDER — LEVALBUTEROL 1.25 MG/.5ML
1.25 SOLUTION, CONCENTRATE RESPIRATORY (INHALATION) EVERY 8 HOURS PRN
Status: DISCONTINUED | OUTPATIENT
Start: 2017-11-03 | End: 2017-11-06 | Stop reason: HOSPADM

## 2017-11-03 RX ORDER — TACROLIMUS 1 MG/1
1 CAPSULE ORAL 2 TIMES DAILY
Status: DISCONTINUED | OUTPATIENT
Start: 2017-11-03 | End: 2017-11-06 | Stop reason: HOSPADM

## 2017-11-03 RX ORDER — FAMOTIDINE 20 MG/1
20 TABLET, FILM COATED ORAL 2 TIMES DAILY
Status: DISCONTINUED | OUTPATIENT
Start: 2017-11-03 | End: 2017-11-06 | Stop reason: HOSPADM

## 2017-11-03 RX ORDER — GUAIFENESIN 600 MG/1
600 TABLET, EXTENDED RELEASE ORAL 2 TIMES DAILY
Status: DISCONTINUED | OUTPATIENT
Start: 2017-11-03 | End: 2017-11-06 | Stop reason: HOSPADM

## 2017-11-03 RX ORDER — ASPIRIN 81 MG/1
81 TABLET ORAL DAILY
Status: DISCONTINUED | OUTPATIENT
Start: 2017-11-04 | End: 2017-11-06 | Stop reason: HOSPADM

## 2017-11-03 RX ORDER — IBUPROFEN 200 MG
16 TABLET ORAL
Status: DISCONTINUED | OUTPATIENT
Start: 2017-11-03 | End: 2017-11-04

## 2017-11-03 RX ORDER — AMLODIPINE BESYLATE 5 MG/1
5 TABLET ORAL DAILY
Status: DISCONTINUED | OUTPATIENT
Start: 2017-11-04 | End: 2017-11-03

## 2017-11-03 RX ORDER — FLUCONAZOLE 200 MG/1
400 TABLET ORAL DAILY
Status: DISCONTINUED | OUTPATIENT
Start: 2017-11-04 | End: 2017-11-06 | Stop reason: HOSPADM

## 2017-11-03 RX ORDER — ACETAMINOPHEN 325 MG/1
650 TABLET ORAL EVERY 6 HOURS PRN
Status: DISCONTINUED | OUTPATIENT
Start: 2017-11-03 | End: 2017-11-06 | Stop reason: HOSPADM

## 2017-11-03 RX ORDER — ENOXAPARIN SODIUM 100 MG/ML
40 INJECTION SUBCUTANEOUS EVERY 24 HOURS
Status: DISCONTINUED | OUTPATIENT
Start: 2017-11-03 | End: 2017-11-06 | Stop reason: HOSPADM

## 2017-11-03 RX ORDER — MAGNESIUM SULFATE HEPTAHYDRATE 40 MG/ML
2 INJECTION, SOLUTION INTRAVENOUS
Status: DISCONTINUED | OUTPATIENT
Start: 2017-11-03 | End: 2017-11-06 | Stop reason: HOSPADM

## 2017-11-03 RX ORDER — FLUTICASONE PROPIONATE 50 MCG
1 SPRAY, SUSPENSION (ML) NASAL DAILY
Status: DISCONTINUED | OUTPATIENT
Start: 2017-11-04 | End: 2017-11-06 | Stop reason: HOSPADM

## 2017-11-03 RX ADMIN — TACROLIMUS 1 MG: 1 CAPSULE ORAL at 06:11

## 2017-11-03 RX ADMIN — INSULIN ASPART 4 UNITS: 100 INJECTION, SOLUTION INTRAVENOUS; SUBCUTANEOUS at 09:11

## 2017-11-03 RX ADMIN — VALGANCICLOVIR 450 MG: 450 TABLET, FILM COATED ORAL at 09:11

## 2017-11-03 RX ADMIN — METHYLPREDNISOLONE SODIUM SUCCINATE: 1 INJECTION, POWDER, LYOPHILIZED, FOR SOLUTION INTRAMUSCULAR; INTRAVENOUS at 08:11

## 2017-11-03 RX ADMIN — MAGNESIUM OXIDE TAB 400 MG (241.3 MG ELEMENTAL MG) 400 MG: 400 (241.3 MG) TAB at 09:11

## 2017-11-03 RX ADMIN — AMLODIPINE BESYLATE 5 MG: 5 TABLET ORAL at 09:11

## 2017-11-03 RX ADMIN — FAMOTIDINE 20 MG: 20 TABLET, FILM COATED ORAL at 09:11

## 2017-11-03 RX ADMIN — GUAIFENESIN 600 MG: 600 TABLET, EXTENDED RELEASE ORAL at 09:11

## 2017-11-03 NOTE — TELEPHONE ENCOUNTER
----- Message from Darrick Wolfe MD sent at 11/3/2017  2:07 PM CDT -----  We need to admit his treat with antibiotics and high dose steroids.

## 2017-11-03 NOTE — PROGRESS NOTES
11/03/17 1823   Vital Signs   Temp 98.5 °F (36.9 °C)   Pulse 94   Resp 16   SpO2 97 %   O2 Device (Oxygen Therapy) room air   /86   MAP (mmHg) 106   Patient Position Sitting   Pt admitted to [facility].  Independent and ambulatory, accompanied by daughter.

## 2017-11-03 NOTE — TELEPHONE ENCOUNTER
Received written orders from Dr. Wolfe instructing patient to come to [facility] to be admitted for treatment of A2 rejection and respiratory infections. Contacted pt and informed him of the above.  Patient was instructed to check in through the admitting office.  Patient did not have any further questions and stated understanding.

## 2017-11-04 PROBLEM — T38.0X5A STEROID-INDUCED HYPERGLYCEMIA: Status: ACTIVE | Noted: 2017-08-22

## 2017-11-04 LAB
ALBUMIN SERPL BCP-MCNC: 3.2 G/DL
ALP SERPL-CCNC: 90 U/L
ALT SERPL W/O P-5'-P-CCNC: 28 U/L
ANION GAP SERPL CALC-SCNC: 11 MMOL/L
AST SERPL-CCNC: 19 U/L
BACTERIA SPEC AEROBE CULT: NORMAL
BACTERIA SPEC AEROBE CULT: NORMAL
BASOPHILS # BLD AUTO: 0.01 K/UL
BASOPHILS NFR BLD: 0.1 %
BILIRUB SERPL-MCNC: 0.5 MG/DL
BUN SERPL-MCNC: 28 MG/DL
CALCIUM SERPL-MCNC: 9.1 MG/DL
CHLORIDE SERPL-SCNC: 100 MMOL/L
CO2 SERPL-SCNC: 24 MMOL/L
CREAT SERPL-MCNC: 1.1 MG/DL
DIFFERENTIAL METHOD: ABNORMAL
EOSINOPHIL # BLD AUTO: 0 K/UL
EOSINOPHIL NFR BLD: 0 %
ERYTHROCYTE [DISTWIDTH] IN BLOOD BY AUTOMATED COUNT: 13.7 %
EST. GFR  (AFRICAN AMERICAN): >60 ML/MIN/1.73 M^2
EST. GFR  (NON AFRICAN AMERICAN): >60 ML/MIN/1.73 M^2
GLUCOSE SERPL-MCNC: 414 MG/DL
GRAM STN SPEC: NORMAL
HCT VFR BLD AUTO: 35.1 %
HGB BLD-MCNC: 11.7 G/DL
IMM GRANULOCYTES # BLD AUTO: 0.42 K/UL
IMM GRANULOCYTES NFR BLD AUTO: 3.8 %
LYMPHOCYTES # BLD AUTO: 0.4 K/UL
LYMPHOCYTES NFR BLD: 3.5 %
MAGNESIUM SERPL-MCNC: 1.5 MG/DL
MCH RBC QN AUTO: 30.1 PG
MCHC RBC AUTO-ENTMCNC: 33.3 G/DL
MCV RBC AUTO: 90 FL
MONOCYTES # BLD AUTO: 0.1 K/UL
MONOCYTES NFR BLD: 1.2 %
NEUTROPHILS # BLD AUTO: 10.1 K/UL
NEUTROPHILS NFR BLD: 91.4 %
NRBC BLD-RTO: 0 /100 WBC
PLATELET # BLD AUTO: 360 K/UL
PMV BLD AUTO: 9.2 FL
POCT GLUCOSE: 252 MG/DL (ref 70–110)
POCT GLUCOSE: 321 MG/DL (ref 70–110)
POCT GLUCOSE: 365 MG/DL (ref 70–110)
POCT GLUCOSE: 450 MG/DL (ref 70–110)
POTASSIUM SERPL-SCNC: 5 MMOL/L
PROT SERPL-MCNC: 6.1 G/DL
RBC # BLD AUTO: 3.89 M/UL
SODIUM SERPL-SCNC: 135 MMOL/L
TACROLIMUS BLD-MCNC: 15.6 NG/ML
WBC # BLD AUTO: 11.05 K/UL

## 2017-11-04 PROCEDURE — 25000003 PHARM REV CODE 250: Performed by: NURSE PRACTITIONER

## 2017-11-04 PROCEDURE — 63600175 PHARM REV CODE 636 W HCPCS: Performed by: INTERNAL MEDICINE

## 2017-11-04 PROCEDURE — 63600175 PHARM REV CODE 636 W HCPCS: Performed by: NURSE PRACTITIONER

## 2017-11-04 PROCEDURE — 25000003 PHARM REV CODE 250: Performed by: INTERNAL MEDICINE

## 2017-11-04 PROCEDURE — 99255 IP/OBS CONSLTJ NEW/EST HI 80: CPT | Mod: ,,, | Performed by: INTERNAL MEDICINE

## 2017-11-04 PROCEDURE — 20600001 HC STEP DOWN PRIVATE ROOM

## 2017-11-04 PROCEDURE — 25000242 PHARM REV CODE 250 ALT 637 W/ HCPCS: Performed by: NURSE PRACTITIONER

## 2017-11-04 PROCEDURE — 99223 1ST HOSP IP/OBS HIGH 75: CPT | Mod: ,,, | Performed by: INTERNAL MEDICINE

## 2017-11-04 PROCEDURE — 99254 IP/OBS CNSLTJ NEW/EST MOD 60: CPT | Mod: ,,, | Performed by: INTERNAL MEDICINE

## 2017-11-04 PROCEDURE — 85025 COMPLETE CBC W/AUTO DIFF WBC: CPT

## 2017-11-04 PROCEDURE — 36415 COLL VENOUS BLD VENIPUNCTURE: CPT

## 2017-11-04 PROCEDURE — 80053 COMPREHEN METABOLIC PANEL: CPT

## 2017-11-04 PROCEDURE — 80197 ASSAY OF TACROLIMUS: CPT

## 2017-11-04 PROCEDURE — 83735 ASSAY OF MAGNESIUM: CPT

## 2017-11-04 PROCEDURE — 25000003 PHARM REV CODE 250: Performed by: HOSPITALIST

## 2017-11-04 RX ORDER — IBUPROFEN 200 MG
24 TABLET ORAL
Status: DISCONTINUED | OUTPATIENT
Start: 2017-11-04 | End: 2017-11-04

## 2017-11-04 RX ORDER — CEFEPIME HYDROCHLORIDE 1 G/50ML
1 INJECTION, SOLUTION INTRAVENOUS
Status: DISCONTINUED | OUTPATIENT
Start: 2017-11-04 | End: 2017-11-06 | Stop reason: HOSPADM

## 2017-11-04 RX ORDER — GLUCAGON 1 MG
1 KIT INJECTION
Status: DISCONTINUED | OUTPATIENT
Start: 2017-11-04 | End: 2017-11-04

## 2017-11-04 RX ORDER — IBUPROFEN 200 MG
16 TABLET ORAL
Status: DISCONTINUED | OUTPATIENT
Start: 2017-11-04 | End: 2017-11-04

## 2017-11-04 RX ORDER — INSULIN ASPART 100 [IU]/ML
1-10 INJECTION, SOLUTION INTRAVENOUS; SUBCUTANEOUS
Status: DISCONTINUED | OUTPATIENT
Start: 2017-11-04 | End: 2017-11-06

## 2017-11-04 RX ORDER — IBUPROFEN 200 MG
16 TABLET ORAL
Status: DISCONTINUED | OUTPATIENT
Start: 2017-11-04 | End: 2017-11-06

## 2017-11-04 RX ORDER — INSULIN ASPART 100 [IU]/ML
10 INJECTION, SOLUTION INTRAVENOUS; SUBCUTANEOUS
Status: DISCONTINUED | OUTPATIENT
Start: 2017-11-04 | End: 2017-11-06

## 2017-11-04 RX ORDER — GLUCAGON 1 MG
1 KIT INJECTION
Status: DISCONTINUED | OUTPATIENT
Start: 2017-11-04 | End: 2017-11-06

## 2017-11-04 RX ORDER — INSULIN ASPART 100 [IU]/ML
6 INJECTION, SOLUTION INTRAVENOUS; SUBCUTANEOUS
Status: DISCONTINUED | OUTPATIENT
Start: 2017-11-04 | End: 2017-11-04

## 2017-11-04 RX ORDER — IBUPROFEN 200 MG
24 TABLET ORAL
Status: DISCONTINUED | OUTPATIENT
Start: 2017-11-04 | End: 2017-11-06

## 2017-11-04 RX ADMIN — GUAIFENESIN 600 MG: 600 TABLET, EXTENDED RELEASE ORAL at 08:11

## 2017-11-04 RX ADMIN — INSULIN ASPART 10 UNITS: 100 INJECTION, SOLUTION INTRAVENOUS; SUBCUTANEOUS at 08:11

## 2017-11-04 RX ADMIN — CEFEPIME HYDROCHLORIDE 1 G: 1 INJECTION, SOLUTION INTRAVENOUS at 03:11

## 2017-11-04 RX ADMIN — FAMOTIDINE 20 MG: 20 TABLET, FILM COATED ORAL at 08:11

## 2017-11-04 RX ADMIN — FLUCONAZOLE 400 MG: 200 TABLET ORAL at 08:11

## 2017-11-04 RX ADMIN — ASPIRIN 81 MG: 81 TABLET, COATED ORAL at 08:11

## 2017-11-04 RX ADMIN — INSULIN ASPART 8 UNITS: 100 INJECTION, SOLUTION INTRAVENOUS; SUBCUTANEOUS at 05:11

## 2017-11-04 RX ADMIN — SODIUM CHLORIDE 1.2 UNITS/HR: 9 INJECTION, SOLUTION INTRAVENOUS at 10:11

## 2017-11-04 RX ADMIN — INSULIN ASPART 10 UNITS: 100 INJECTION, SOLUTION INTRAVENOUS; SUBCUTANEOUS at 12:11

## 2017-11-04 RX ADMIN — CEFEPIME HYDROCHLORIDE 1 G: 1 INJECTION, SOLUTION INTRAVENOUS at 10:11

## 2017-11-04 RX ADMIN — FLUTICASONE PROPIONATE 1 SPRAY: 50 SPRAY, METERED NASAL at 08:11

## 2017-11-04 RX ADMIN — AMLODIPINE BESYLATE 5 MG: 5 TABLET ORAL at 08:11

## 2017-11-04 RX ADMIN — VALGANCICLOVIR 450 MG: 450 TABLET, FILM COATED ORAL at 08:11

## 2017-11-04 RX ADMIN — MAGNESIUM OXIDE TAB 400 MG (241.3 MG ELEMENTAL MG) 400 MG: 400 (241.3 MG) TAB at 08:11

## 2017-11-04 RX ADMIN — INSULIN ASPART 3 UNITS: 100 INJECTION, SOLUTION INTRAVENOUS; SUBCUTANEOUS at 11:11

## 2017-11-04 RX ADMIN — TACROLIMUS 1 MG: 1 CAPSULE ORAL at 05:11

## 2017-11-04 RX ADMIN — INSULIN ASPART 6 UNITS: 100 INJECTION, SOLUTION INTRAVENOUS; SUBCUTANEOUS at 12:11

## 2017-11-04 RX ADMIN — TACROLIMUS 1 MG: 1 CAPSULE ORAL at 08:11

## 2017-11-04 RX ADMIN — METHYLPREDNISOLONE SODIUM SUCCINATE: 1 INJECTION, POWDER, LYOPHILIZED, FOR SOLUTION INTRAMUSCULAR; INTRAVENOUS at 08:11

## 2017-11-04 RX ADMIN — INSULIN ASPART 10 UNITS: 100 INJECTION, SOLUTION INTRAVENOUS; SUBCUTANEOUS at 05:11

## 2017-11-04 NOTE — PLAN OF CARE
Problem: Patient Care Overview  Goal: Plan of Care Review  Outcome: Ongoing (interventions implemented as appropriate)  Pt has rejection of the lungs. Pt receiving 1st dose of solumedrol now. Pt encouraged to wash hands. Pt has blood pressure meds.

## 2017-11-04 NOTE — PROGRESS NOTES
; spoke to Dr. Dempsey with endocrinology regarding value. MD to come assess patient and modify orders. Will monitor.

## 2017-11-04 NOTE — H&P
[facility]  Lung Transplant  H&P    Patient Name: Martin Hendrix Jr.  MRN: [id number]  Admission Date: 11/3/2017  Attending Physician: Darrick Wolfe MD  Primary Care Provider: Sukhi Dunham Jr, MD     Subjective:     History of Present Illness:  Martin is a 55 y/o man with a history of bilateral lung transplant who presented to the hospital after being instructed to be admitted for treatment of acute rejection. He had a bronchoscopy performed earlier this week which was positive for A2 rejection and also an infection with enterobacter. He was treated about three weeks ago for A2 rejection also.    He says that he is feeling well. He has a dry cough but denies shortness of breath or chest pain. He asks if his CPAP could be playing a role in his developing infections and rejection.     Past Medical History:   Diagnosis Date    Atrial fibrillation 8/23/2017    On home oxygen therapy     KRISTYN on CPAP     Osteopenia     Pulmonary hypertension     Sarcoidosis     Shortness of breath        Past Surgical History:   Procedure Laterality Date    ABDOMINAL SURGERY      6 weeks old    BRONCHOSCOPY      CHEST TUBE INSERTION      CHOLECYSTECTOMY      LUNG BIOPSY      LUNG TRANSPLANT  08/2017       Review of patient's allergies indicates:  No Known Allergies    PTA Medications   Medication Sig    ACCU-CHEK DEANNE PLUS METER Misc USE AS DIRECTED    ACCU-CHEK SOFTCLIX LANCETS Misc CHECK BLOOD GLUCOSE ONCE QD    amlodipine (NORVASC) 5 MG tablet Take 1 tablet (5 mg total) by mouth once daily.    aspirin (ECOTRIN) 81 MG EC tablet Take 1 tablet (81 mg total) by mouth once daily.    calcium-vitamin D tablet 600 mg-200 units Take 1 tablet by mouth 2 (two) times daily.    famotidine (PEPCID) 20 MG tablet Take 1 tablet (20 mg total) by mouth 2 (two) times daily.    fluconazole (DIFLUCAN) 200 MG Tab Take 2 tablets (400 mg total) by mouth once daily.    fluticasone (FLONASE) 50 mcg/actuation nasal  spray 1 spray by Each Nare route once daily.    folic acid (FOLVITE) 1 MG tablet Take 1 tablet (1 mg total) by mouth once daily.    furosemide (LASIX) 40 MG tablet Take 1 tablet (40 mg total) by mouth once daily. (Patient taking differently: Take 40 mg by mouth daily as needed. )    guaifenesin (MUCINEX) 600 mg 12 hr tablet Take 1 tablet (600 mg total) by mouth 2 (two) times daily.    magnesium oxide (MAG-OX) 400 mg tablet Take 1 tablet (400 mg total) by mouth 2 (two) times daily.    multivitamin (THERAGRAN) per tablet Take 1 tablet by mouth once daily.    mycophenolate (CELLCEPT) 250 mg Cap Take 2 capsules (500 mg total) by mouth 2 (two) times daily.    oxycodone-acetaminophen (PERCOCET) 7.5-325 mg per tablet Take 1 tablet by mouth every 4 (four) hours as needed.    predniSONE (DELTASONE) 10 MG tablet Take by mouth. Taper:  30 mg x 21 d, 20 mg x 60 d, 15 mg x 30 d, 10 mg x 60 d, then 5 mg daily.    sulfamethoxazole-trimethoprim 800-160mg (BACTRIM DS) 800-160 mg Tab Take 1 tablet by mouth every Mon, Wed, Fri.    tacrolimus (PROGRAF) 0.5 MG Cap Take 2 capsules (1 mg total) by mouth every 12 (twelve) hours.    valganciclovir (VALCYTE) 450 mg Tab Take 1 tablet (450 mg total) by mouth 2 (two) times daily.     Family History     Problem Relation (Age of Onset)    Diabetes Father, Brother    Hypertension Mother    Kidney disease Sister        Social History Main Topics    Smoking status: Never Smoker    Smokeless tobacco: Never Used    Alcohol use No    Drug use: No    Sexual activity: Not on file     Review of Systems   Constitutional: Negative for activity change, appetite change, chills, diaphoresis, fatigue, fever and unexpected weight change.   HENT: Negative for dental problem, drooling, ear discharge, ear pain, facial swelling, hearing loss, mouth sores, nosebleeds, postnasal drip, rhinorrhea, sinus pressure, sneezing, sore throat, tinnitus, trouble swallowing and voice change.    Eyes: Negative for  photophobia, pain, discharge, redness, itching and visual disturbance.   Respiratory: Positive for cough. Negative for apnea, choking, chest tightness, shortness of breath, wheezing and stridor.    Cardiovascular: Negative for chest pain, palpitations and leg swelling.   Gastrointestinal: Negative for abdominal distention, abdominal pain, anal bleeding, blood in stool, constipation, diarrhea, nausea, rectal pain and vomiting.   Endocrine: Negative for cold intolerance and heat intolerance.   Genitourinary: Negative for difficulty urinating, dysuria, flank pain, frequency and hematuria.   Musculoskeletal: Negative for arthralgias, back pain, gait problem, joint swelling, myalgias, neck pain and neck stiffness.   Skin: Negative for color change.   Neurological: Negative for dizziness, tremors, weakness, light-headedness, numbness and headaches.   Psychiatric/Behavioral: Negative for agitation, hallucinations and sleep disturbance. The patient is not nervous/anxious.      Objective:     Vital Signs (Most Recent):  Temp: 97.4 °F (36.3 °C) (11/04/17 1507)  Pulse: 85 (11/04/17 1507)  Resp: 16 (11/04/17 1507)  BP: 134/79 (11/04/17 1507)  SpO2: 97 % (11/04/17 1507) Vital Signs (24h Range):  Temp:  [97.4 °F (36.3 °C)-98.6 °F (37 °C)] 97.4 °F (36.3 °C)  Pulse:  [82-99] 85  Resp:  [16-18] 16  SpO2:  [94 %-98 %] 97 %  BP: (133-162)/() 134/79     Weight: 88 kg (194 lb)  Body mass index is 27.06 kg/m².      Intake/Output Summary (Last 24 hours) at 11/04/17 1600  Last data filed at 11/04/17 1100   Gross per 24 hour   Intake             1200 ml   Output                0 ml   Net             1200 ml       Physical Exam   Constitutional: He is oriented to person, place, and time. He appears well-developed and well-nourished. No distress.   HENT:   Head: Normocephalic and atraumatic.   Nose: Nose normal.   Mouth/Throat: Oropharynx is clear and moist. No oropharyngeal exudate.   Eyes: Conjunctivae and EOM are normal. Pupils are  equal, round, and reactive to light. No scleral icterus.   Neck: Normal range of motion. Neck supple. No JVD present. No tracheal deviation present. No thyromegaly present.   Cardiovascular: Normal rate, regular rhythm and normal heart sounds.    Pulmonary/Chest: Effort normal and breath sounds normal. No respiratory distress. He has no wheezes. He has no rales. He exhibits no tenderness.   Abdominal: Soft. Bowel sounds are normal. He exhibits no distension. There is no tenderness.   Musculoskeletal: Normal range of motion. He exhibits no edema or tenderness.   Neurological: He is alert and oriented to person, place, and time. He has normal reflexes. No cranial nerve deficit.   Skin: Skin is warm and dry. He is not diaphoretic.   Psychiatric: He has a normal mood and affect.       Significant Labs:  CBC:    Recent Labs  Lab 11/04/17  0637   WBC 11.05   RBC 3.89*   HGB 11.7*   HCT 35.1*   *   MCV 90   MCH 30.1   MCHC 33.3     BMP:    Recent Labs  Lab 11/04/17  0637   *   K 5.0      CO2 24   BUN 28*   CREATININE 1.1   CALCIUM 9.1        I have reviewed all pertinent labs within the past 24 hours.    Diagnostic Results:  n/a    Assessment/Plan:     * Acute rejection of lung transplant    Will start high dose steroids 15 mg/kg as treatment for his acute rejection. He will receive three doses.        LRTI (lower respiratory tract infection)    Cefepime started for his enterobacter infection. PCR positive for A baumanii and Moraxella. Appreciate input from ID.         Immunosuppression    Will continue tacrolimus, mycophenolate, and prednisone. Will adjust doses before discharge.         Steroid-induced hyperglycemia    Appreciate input from Endocrinology            Darrikc Wolfe MD  Lung Transplant  [facility]

## 2017-11-04 NOTE — ASSESSMENT & PLAN NOTE
Cefepime started for his enterobacter infection. PCR positive for A baumanii and Moraxella. Appreciate input from ID.

## 2017-11-04 NOTE — SUBJECTIVE & OBJECTIVE
Past Medical History:   Diagnosis Date    Atrial fibrillation 8/23/2017    On home oxygen therapy     KRISTYN on CPAP     Osteopenia     Pulmonary hypertension     Sarcoidosis     Shortness of breath        Past Surgical History:   Procedure Laterality Date    ABDOMINAL SURGERY      6 weeks old    BRONCHOSCOPY      CHEST TUBE INSERTION      CHOLECYSTECTOMY      LUNG BIOPSY      LUNG TRANSPLANT  08/2017       Review of patient's allergies indicates:  No Known Allergies    Medications:  Prescriptions Prior to Admission   Medication Sig    ACCU-CHEK DEANNE PLUS METER Misc USE AS DIRECTED    ACCU-CHEK SOFTCLIX LANCETS Misc CHECK BLOOD GLUCOSE ONCE QD    amlodipine (NORVASC) 5 MG tablet Take 1 tablet (5 mg total) by mouth once daily.    aspirin (ECOTRIN) 81 MG EC tablet Take 1 tablet (81 mg total) by mouth once daily.    calcium-vitamin D tablet 600 mg-200 units Take 1 tablet by mouth 2 (two) times daily.    famotidine (PEPCID) 20 MG tablet Take 1 tablet (20 mg total) by mouth 2 (two) times daily.    fluconazole (DIFLUCAN) 200 MG Tab Take 2 tablets (400 mg total) by mouth once daily.    fluticasone (FLONASE) 50 mcg/actuation nasal spray 1 spray by Each Nare route once daily.    folic acid (FOLVITE) 1 MG tablet Take 1 tablet (1 mg total) by mouth once daily.    furosemide (LASIX) 40 MG tablet Take 1 tablet (40 mg total) by mouth once daily. (Patient taking differently: Take 40 mg by mouth daily as needed. )    guaifenesin (MUCINEX) 600 mg 12 hr tablet Take 1 tablet (600 mg total) by mouth 2 (two) times daily.    magnesium oxide (MAG-OX) 400 mg tablet Take 1 tablet (400 mg total) by mouth 2 (two) times daily.    multivitamin (THERAGRAN) per tablet Take 1 tablet by mouth once daily.    mycophenolate (CELLCEPT) 250 mg Cap Take 2 capsules (500 mg total) by mouth 2 (two) times daily.    oxycodone-acetaminophen (PERCOCET) 7.5-325 mg per tablet Take 1 tablet by mouth every 4 (four) hours as needed.     predniSONE (DELTASONE) 10 MG tablet Take by mouth. Taper:  30 mg x 21 d, 20 mg x 60 d, 15 mg x 30 d, 10 mg x 60 d, then 5 mg daily.    sulfamethoxazole-trimethoprim 800-160mg (BACTRIM DS) 800-160 mg Tab Take 1 tablet by mouth every Mon, Wed, Fri.    tacrolimus (PROGRAF) 0.5 MG Cap Take 2 capsules (1 mg total) by mouth every 12 (twelve) hours.    valganciclovir (VALCYTE) 450 mg Tab Take 1 tablet (450 mg total) by mouth 2 (two) times daily.     Antibiotics     Start     Stop Route Frequency Ordered    11/06/17 1700  sulfamethoxazole-trimethoprim 800-160mg per tablet 1 tablet      -- Oral Every Mon, Wed, Fri 11/03/17 1810    11/04/17 1215  cefepime in dextrose 5 % 1 gram/50 mL IVPB 1 g      -- IV Every 8 hours (non-standard times) 11/04/17 1105        Antifungals     Start     Stop Route Frequency Ordered    11/04/17 0900  fluconazole tablet 400 mg      -- Oral Daily 11/03/17 1810        Antivirals         Stop Route Frequency     valGANciclovir      -- Oral 2 times daily           Immunization History   Administered Date(s) Administered    Hepatitis A, Adult 04/26/2017    Hepatitis B, Dialysis, 3 Dose 04/26/2017, 08/10/2017       Family History     Problem Relation (Age of Onset)    Diabetes Father, Brother    Hypertension Mother    Kidney disease Sister        Social History     Social History    Marital status:      Spouse name: N/A    Number of children: N/A    Years of education: N/A     Social History Main Topics    Smoking status: Never Smoker    Smokeless tobacco: Never Used    Alcohol use No    Drug use: No    Sexual activity: Not Asked     Other Topics Concern    None     Social History Narrative    None     Review of Systems   Constitutional: Negative for activity change, chills, fatigue and fever.   HENT: Positive for rhinorrhea and voice change. Negative for mouth sores, postnasal drip, sinus pain, sinus pressure, sore throat and trouble swallowing.    Respiratory: Positive for cough.  Negative for shortness of breath.    Gastrointestinal: Negative for abdominal distention, abdominal pain, nausea and vomiting.     Objective:     Vital Signs (Most Recent):  Temp: 97.5 °F (36.4 °C) (11/04/17 1110)  Pulse: 97 (11/04/17 1110)  Resp: 18 (11/04/17 1110)  BP: 133/83 (11/04/17 1110)  SpO2: 97 % (11/04/17 1110) Vital Signs (24h Range):  Temp:  [97.5 °F (36.4 °C)-98.6 °F (37 °C)] 97.5 °F (36.4 °C)  Pulse:  [82-99] 97  Resp:  [16-18] 18  SpO2:  [94 %-98 %] 97 %  BP: (133-162)/() 133/83     Weight: 88 kg (194 lb)  Body mass index is 27.06 kg/m².    Estimated Creatinine Clearance: 81.8 mL/min (based on SCr of 1.1 mg/dL).    Physical Exam   Constitutional: He is oriented to person, place, and time. He appears well-developed and well-nourished.   HENT:   Head: Normocephalic and atraumatic.   Eyes: Conjunctivae and EOM are normal. Pupils are equal, round, and reactive to light.   Neck: Normal range of motion. Neck supple.   Cardiovascular: Normal rate, regular rhythm and normal heart sounds.    Pulmonary/Chest: Effort normal and breath sounds normal.   Abdominal: Soft. Bowel sounds are normal. He exhibits no distension. There is no tenderness. There is no rebound.   Musculoskeletal: Normal range of motion. He exhibits no edema, tenderness or deformity.   Neurological: He is alert and oriented to person, place, and time.   Skin: No rash noted. No erythema.       Significant Labs:   Blood Culture: No results for input(s): LABBLOO in the last 4320 hours.  BMP:   Recent Labs  Lab 11/04/17 0637   *   *   K 5.0      CO2 24   BUN 28*   CREATININE 1.1   CALCIUM 9.1   MG 1.5*     CBC:   Recent Labs  Lab 11/04/17 0637   WBC 11.05   HGB 11.7*   HCT 35.1*   *     CMP:   Recent Labs  Lab 11/04/17  0637   *   K 5.0      CO2 24   *   BUN 28*   CREATININE 1.1   CALCIUM 9.1   PROT 6.1   ALBUMIN 3.2*   BILITOT 0.5   ALKPHOS 90   AST 19   ALT 28   ANIONGAP 11   EGFRNONAA >60.0      Microbiology Results (last 7 days)     ** No results found for the last 168 hours. **          Significant Imaging: I have reviewed all pertinent imaging results/findings within the past 24 hours.

## 2017-11-04 NOTE — ASSESSMENT & PLAN NOTE
Change to diabetic diet.    Ordered transition insulin gtt at 1.2 units/hr with stepdown dosing. Novolog 6 units with meals + moderate correction dose.    Will be less aggressive than we typically would based on his relatively low insulin requirements in August when he got high dose steroids.    Monitor POC qAC, hs, 0200 and adjust insulin accordingly.    Check A1c in AM.    If he is going to be discharged home on maintenance prednisone, he will likely require some insulin. Will need to know the decision on long-term steroids before making definitive recommendations.

## 2017-11-04 NOTE — ASSESSMENT & PLAN NOTE
Will start high dose steroids 15 mg/kg as treatment for his acute rejection. He will receive three doses.

## 2017-11-04 NOTE — HPI
Martin is a 55 y/o man with a history of bilateral lung transplant who presented to the hospital after being instructed to be admitted for treatment of acute rejection. He had a bronchoscopy performed earlier this week which was positive for A2 rejection and also an infection with enterobacter. He was treated about three weeks ago for A2 rejection also.    He says that he is feeling well. He has a dry cough but denies shortness of breath or chest pain. He asks if his CPAP could be playing a role in his developing infections and rejection.

## 2017-11-04 NOTE — CONSULTS
[facility]  Infectious Disease  Consult Note    Patient Name: Martin Hendrix Jr.  MRN: [id number]  Admission Date: 11/3/2017  Hospital Length of Stay: 1 days  Attending Physician: Darrick Wolfe MD  Primary Care Provider: Sukhi Dunham Jr, MD     Isolation Status: No active isolations    Patient information was obtained from patient and ER records.      Consults  Assessment/Plan:     LRTI (lower respiratory tract infection)    Patient with positive BAL cultures for Enterobacter aerogenes 11/2/17. Would recommend to treat as acute bacterial pneumonia for course of 7-10 days. Will treat empirically with cefepime and await final sensitivity results to adjust management. Patient asymptomatic with no acute changes in chest imagining. Stable WBC and O2 sat 96% at room. Would not see inconvenience with at home treatment.     - Start cefepime 1g IV every 8hrs   - Length 7-10 days   - Will follow final sensitivities results               Thank you for your consult. I will follow-up with patient. Please contact us if you have any additional questions.    Dane Ferris MD  Infectious Disease  [facility]    Subjective:     Principal Problem: Lung transplant rejection    HPI: Case of 57 y/o male admitted on 11/3/17. 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 CMV D+/R- on maintenance tacro/mmf/pred. On ppx with bactrim/fluc/valgan. 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 Wolfe and vocal cord dysfunction followed by ENT. Was admitted at this time for follow up surveillance bronchoscopy. Patient reports is at baseline heatlh with no worsening SOB, fevers, chills or nausea. Denies any recent sick contacts. Mentions periodic sputum production when coughing with yellow/green color, particularly after procedures. Has past positive bronchial wash cultures for MRSA  And C. albicans on 8/23/17.  Recent culture done on BAL from 11/2/17 growing Enterobacter aerogenes. ID consulted at this time for additional recommendation.       Past Medical History:   Diagnosis Date    Atrial fibrillation 8/23/2017    On home oxygen therapy     KRISTYN on CPAP     Osteopenia     Pulmonary hypertension     Sarcoidosis     Shortness of breath        Past Surgical History:   Procedure Laterality Date    ABDOMINAL SURGERY      6 weeks old    BRONCHOSCOPY      CHEST TUBE INSERTION      CHOLECYSTECTOMY      LUNG BIOPSY      LUNG TRANSPLANT  08/2017       Review of patient's allergies indicates:  No Known Allergies    Medications:  Prescriptions Prior to Admission   Medication Sig    ACCU-CHEK DEANNE PLUS METER Misc USE AS DIRECTED    ACCU-CHEK SOFTCLIX LANCETS Misc CHECK BLOOD GLUCOSE ONCE QD    amlodipine (NORVASC) 5 MG tablet Take 1 tablet (5 mg total) by mouth once daily.    aspirin (ECOTRIN) 81 MG EC tablet Take 1 tablet (81 mg total) by mouth once daily.    calcium-vitamin D tablet 600 mg-200 units Take 1 tablet by mouth 2 (two) times daily.    famotidine (PEPCID) 20 MG tablet Take 1 tablet (20 mg total) by mouth 2 (two) times daily.    fluconazole (DIFLUCAN) 200 MG Tab Take 2 tablets (400 mg total) by mouth once daily.    fluticasone (FLONASE) 50 mcg/actuation nasal spray 1 spray by Each Nare route once daily.    folic acid (FOLVITE) 1 MG tablet Take 1 tablet (1 mg total) by mouth once daily.    furosemide (LASIX) 40 MG tablet Take 1 tablet (40 mg total) by mouth once daily. (Patient taking differently: Take 40 mg by mouth daily as needed. )    guaifenesin (MUCINEX) 600 mg 12 hr tablet Take 1 tablet (600 mg total) by mouth 2 (two) times daily.    magnesium oxide (MAG-OX) 400 mg tablet Take 1 tablet (400 mg total) by mouth 2 (two) times daily.    multivitamin (THERAGRAN) per tablet Take 1 tablet by mouth once daily.    mycophenolate (CELLCEPT) 250 mg Cap Take 2 capsules (500 mg total) by mouth 2 (two)  times daily.    oxycodone-acetaminophen (PERCOCET) 7.5-325 mg per tablet Take 1 tablet by mouth every 4 (four) hours as needed.    predniSONE (DELTASONE) 10 MG tablet Take by mouth. Taper:  30 mg x 21 d, 20 mg x 60 d, 15 mg x 30 d, 10 mg x 60 d, then 5 mg daily.    sulfamethoxazole-trimethoprim 800-160mg (BACTRIM DS) 800-160 mg Tab Take 1 tablet by mouth every Mon, Wed, Fri.    tacrolimus (PROGRAF) 0.5 MG Cap Take 2 capsules (1 mg total) by mouth every 12 (twelve) hours.    valganciclovir (VALCYTE) 450 mg Tab Take 1 tablet (450 mg total) by mouth 2 (two) times daily.     Antibiotics     Start     Stop Route Frequency Ordered    11/06/17 1700  sulfamethoxazole-trimethoprim 800-160mg per tablet 1 tablet      -- Oral Every Mon, Wed, Fri 11/03/17 1810    11/04/17 1215  cefepime in dextrose 5 % 1 gram/50 mL IVPB 1 g      -- IV Every 8 hours (non-standard times) 11/04/17 1105        Antifungals     Start     Stop Route Frequency Ordered    11/04/17 0900  fluconazole tablet 400 mg      -- Oral Daily 11/03/17 1810        Antivirals         Stop Route Frequency     valGANciclovir      -- Oral 2 times daily           Immunization History   Administered Date(s) Administered    Hepatitis A, Adult 04/26/2017    Hepatitis B, Dialysis, 3 Dose 04/26/2017, 08/10/2017       Family History     Problem Relation (Age of Onset)    Diabetes Father, Brother    Hypertension Mother    Kidney disease Sister        Social History     Social History    Marital status:      Spouse name: N/A    Number of children: N/A    Years of education: N/A     Social History Main Topics    Smoking status: Never Smoker    Smokeless tobacco: Never Used    Alcohol use No    Drug use: No    Sexual activity: Not Asked     Other Topics Concern    None     Social History Narrative    None     Review of Systems   Constitutional: Negative for activity change, chills, fatigue and fever.   HENT: Positive for rhinorrhea and voice change. Negative  for mouth sores, postnasal drip, sinus pain, sinus pressure, sore throat and trouble swallowing.    Respiratory: Positive for cough. Negative for shortness of breath.    Gastrointestinal: Negative for abdominal distention, abdominal pain, nausea and vomiting.     Objective:     Vital Signs (Most Recent):  Temp: 97.5 °F (36.4 °C) (11/04/17 1110)  Pulse: 97 (11/04/17 1110)  Resp: 18 (11/04/17 1110)  BP: 133/83 (11/04/17 1110)  SpO2: 97 % (11/04/17 1110) Vital Signs (24h Range):  Temp:  [97.5 °F (36.4 °C)-98.6 °F (37 °C)] 97.5 °F (36.4 °C)  Pulse:  [82-99] 97  Resp:  [16-18] 18  SpO2:  [94 %-98 %] 97 %  BP: (133-162)/() 133/83     Weight: 88 kg (194 lb)  Body mass index is 27.06 kg/m².    Estimated Creatinine Clearance: 81.8 mL/min (based on SCr of 1.1 mg/dL).    Physical Exam   Constitutional: He is oriented to person, place, and time. He appears well-developed and well-nourished.   HENT:   Head: Normocephalic and atraumatic.   Eyes: Conjunctivae and EOM are normal. Pupils are equal, round, and reactive to light.   Neck: Normal range of motion. Neck supple.   Cardiovascular: Normal rate, regular rhythm and normal heart sounds.    Pulmonary/Chest: Effort normal and breath sounds normal.   Abdominal: Soft. Bowel sounds are normal. He exhibits no distension. There is no tenderness. There is no rebound.   Musculoskeletal: Normal range of motion. He exhibits no edema, tenderness or deformity.   Neurological: He is alert and oriented to person, place, and time.   Skin: No rash noted. No erythema.       Significant Labs:   Blood Culture: No results for input(s): LABBLOO in the last 4320 hours.  BMP:   Recent Labs  Lab 11/04/17  0637   *   *   K 5.0      CO2 24   BUN 28*   CREATININE 1.1   CALCIUM 9.1   MG 1.5*     CBC:   Recent Labs  Lab 11/04/17  0637   WBC 11.05   HGB 11.7*   HCT 35.1*   *     CMP:   Recent Labs  Lab 11/04/17  0637   *   K 5.0      CO2 24   *   BUN 28*    CREATININE 1.1   CALCIUM 9.1   PROT 6.1   ALBUMIN 3.2*   BILITOT 0.5   ALKPHOS 90   AST 19   ALT 28   ANIONGAP 11   EGFRNONAA >60.0     Microbiology Results (last 7 days)     ** No results found for the last 168 hours. **          Significant Imaging: I have reviewed all pertinent imaging results/findings within the past 24 hours.

## 2017-11-04 NOTE — SUBJECTIVE & OBJECTIVE
Past Medical History:   Diagnosis Date    Atrial fibrillation 8/23/2017    On home oxygen therapy     KRISTYN on CPAP     Osteopenia     Pulmonary hypertension     Sarcoidosis     Shortness of breath        Past Surgical History:   Procedure Laterality Date    ABDOMINAL SURGERY      6 weeks old    BRONCHOSCOPY      CHEST TUBE INSERTION      CHOLECYSTECTOMY      LUNG BIOPSY      LUNG TRANSPLANT  08/2017       Review of patient's allergies indicates:  No Known Allergies    PTA Medications   Medication Sig    ACCU-CHEK DEANNE PLUS METER Misc USE AS DIRECTED    ACCU-CHEK SOFTCLIX LANCETS Misc CHECK BLOOD GLUCOSE ONCE QD    amlodipine (NORVASC) 5 MG tablet Take 1 tablet (5 mg total) by mouth once daily.    aspirin (ECOTRIN) 81 MG EC tablet Take 1 tablet (81 mg total) by mouth once daily.    calcium-vitamin D tablet 600 mg-200 units Take 1 tablet by mouth 2 (two) times daily.    famotidine (PEPCID) 20 MG tablet Take 1 tablet (20 mg total) by mouth 2 (two) times daily.    fluconazole (DIFLUCAN) 200 MG Tab Take 2 tablets (400 mg total) by mouth once daily.    fluticasone (FLONASE) 50 mcg/actuation nasal spray 1 spray by Each Nare route once daily.    folic acid (FOLVITE) 1 MG tablet Take 1 tablet (1 mg total) by mouth once daily.    furosemide (LASIX) 40 MG tablet Take 1 tablet (40 mg total) by mouth once daily. (Patient taking differently: Take 40 mg by mouth daily as needed. )    guaifenesin (MUCINEX) 600 mg 12 hr tablet Take 1 tablet (600 mg total) by mouth 2 (two) times daily.    magnesium oxide (MAG-OX) 400 mg tablet Take 1 tablet (400 mg total) by mouth 2 (two) times daily.    multivitamin (THERAGRAN) per tablet Take 1 tablet by mouth once daily.    mycophenolate (CELLCEPT) 250 mg Cap Take 2 capsules (500 mg total) by mouth 2 (two) times daily.    oxycodone-acetaminophen (PERCOCET) 7.5-325 mg per tablet Take 1 tablet by mouth every 4 (four) hours as needed.    predniSONE (DELTASONE) 10 MG  tablet Take by mouth. Taper:  30 mg x 21 d, 20 mg x 60 d, 15 mg x 30 d, 10 mg x 60 d, then 5 mg daily.    sulfamethoxazole-trimethoprim 800-160mg (BACTRIM DS) 800-160 mg Tab Take 1 tablet by mouth every Mon, Wed, Fri.    tacrolimus (PROGRAF) 0.5 MG Cap Take 2 capsules (1 mg total) by mouth every 12 (twelve) hours.    valganciclovir (VALCYTE) 450 mg Tab Take 1 tablet (450 mg total) by mouth 2 (two) times daily.     Family History     Problem Relation (Age of Onset)    Diabetes Father, Brother    Hypertension Mother    Kidney disease Sister        Social History Main Topics    Smoking status: Never Smoker    Smokeless tobacco: Never Used    Alcohol use No    Drug use: No    Sexual activity: Not on file     Review of Systems   Constitutional: Negative for activity change, appetite change, chills, diaphoresis, fatigue, fever and unexpected weight change.   HENT: Negative for dental problem, drooling, ear discharge, ear pain, facial swelling, hearing loss, mouth sores, nosebleeds, postnasal drip, rhinorrhea, sinus pressure, sneezing, sore throat, tinnitus, trouble swallowing and voice change.    Eyes: Negative for photophobia, pain, discharge, redness, itching and visual disturbance.   Respiratory: Positive for cough. Negative for apnea, choking, chest tightness, shortness of breath, wheezing and stridor.    Cardiovascular: Negative for chest pain, palpitations and leg swelling.   Gastrointestinal: Negative for abdominal distention, abdominal pain, anal bleeding, blood in stool, constipation, diarrhea, nausea, rectal pain and vomiting.   Endocrine: Negative for cold intolerance and heat intolerance.   Genitourinary: Negative for difficulty urinating, dysuria, flank pain, frequency and hematuria.   Musculoskeletal: Negative for arthralgias, back pain, gait problem, joint swelling, myalgias, neck pain and neck stiffness.   Skin: Negative for color change.   Neurological: Negative for dizziness, tremors, weakness,  light-headedness, numbness and headaches.   Psychiatric/Behavioral: Negative for agitation, hallucinations and sleep disturbance. The patient is not nervous/anxious.      Objective:     Vital Signs (Most Recent):  Temp: 97.4 °F (36.3 °C) (11/04/17 1507)  Pulse: 85 (11/04/17 1507)  Resp: 16 (11/04/17 1507)  BP: 134/79 (11/04/17 1507)  SpO2: 97 % (11/04/17 1507) Vital Signs (24h Range):  Temp:  [97.4 °F (36.3 °C)-98.6 °F (37 °C)] 97.4 °F (36.3 °C)  Pulse:  [82-99] 85  Resp:  [16-18] 16  SpO2:  [94 %-98 %] 97 %  BP: (133-162)/() 134/79     Weight: 88 kg (194 lb)  Body mass index is 27.06 kg/m².      Intake/Output Summary (Last 24 hours) at 11/04/17 1600  Last data filed at 11/04/17 1100   Gross per 24 hour   Intake             1200 ml   Output                0 ml   Net             1200 ml       Physical Exam   Constitutional: He is oriented to person, place, and time. He appears well-developed and well-nourished. No distress.   HENT:   Head: Normocephalic and atraumatic.   Nose: Nose normal.   Mouth/Throat: Oropharynx is clear and moist. No oropharyngeal exudate.   Eyes: Conjunctivae and EOM are normal. Pupils are equal, round, and reactive to light. No scleral icterus.   Neck: Normal range of motion. Neck supple. No JVD present. No tracheal deviation present. No thyromegaly present.   Cardiovascular: Normal rate, regular rhythm and normal heart sounds.    Pulmonary/Chest: Effort normal and breath sounds normal. No respiratory distress. He has no wheezes. He has no rales. He exhibits no tenderness.   Abdominal: Soft. Bowel sounds are normal. He exhibits no distension. There is no tenderness.   Musculoskeletal: Normal range of motion. He exhibits no edema or tenderness.   Neurological: He is alert and oriented to person, place, and time. He has normal reflexes. No cranial nerve deficit.   Skin: Skin is warm and dry. He is not diaphoretic.   Psychiatric: He has a normal mood and affect.       Significant  Labs:  CBC:    Recent Labs  Lab 11/04/17  0637   WBC 11.05   RBC 3.89*   HGB 11.7*   HCT 35.1*   *   MCV 90   MCH 30.1   MCHC 33.3     BMP:    Recent Labs  Lab 11/04/17  0637   *   K 5.0      CO2 24   BUN 28*   CREATININE 1.1   CALCIUM 9.1        I have reviewed all pertinent labs within the past 24 hours.    Diagnostic Results:  n/a

## 2017-11-04 NOTE — HPI
Reason for Consult: Management of steroid-induced hyperglycemia     Home Diabetes Medications:  None    How often checking glucose at home? 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 He is now s/p bilateral lung transplant. He was admitted on 11/3 for transplant rejection and has developed steroid-induced hyperglycemia. He had similar steroid-induced hyperglycemia in August. His insulin requirements were relatively minimal even with high dose steroids, and he was weaned off insulin completely at discharge. Endocrine consulted for management.

## 2017-11-04 NOTE — SUBJECTIVE & OBJECTIVE
Interval HPI:   Overnight events: Received pulse dose MPDN overnight. Glucose 365 this AM.  Eating:   Hasn't eaten yet, about to eat breakfast.  Nausea: No  Hypoglycemia and intervention: No  Fever: No    PMH, PSH, FH, SH updated and reviewed     Review of Systems   Constitutional: Negative for unexpected weight change.   Eyes: Negative for visual disturbance.   Respiratory: Positive for shortness of breath (not worse than baseline).    Cardiovascular: Negative for chest pain.   Gastrointestinal: Negative for abdominal pain.   Genitourinary: Negative for urgency.   Musculoskeletal: Negative for arthralgias.        Occasional leg swelling, not currently   Skin: Negative for wound.   Neurological: Negative for headaches.   Hematological: Does not bruise/bleed easily.   Psychiatric/Behavioral: Negative for sleep disturbance.       PHYSICAL EXAMINATION:  Vitals:    11/04/17 0732   BP: (!) 148/93   Pulse: 99   Resp: 18   Temp: 97.6 °F (36.4 °C)     Body mass index is 27.06 kg/m².    Physical Exam   Constitutional: He is oriented to person, place, and time. He appears well-developed.   HENT:   Right Ear: External ear normal.   Left Ear: External ear normal.   Nose: Nose normal.   Hearing grossly normal  Dentition grossly normal   Cardiovascular: Normal rate.    No murmur heard.  Pulmonary/Chest: Effort normal. No respiratory distress. He has no wheezes.   Musculoskeletal: He exhibits no edema.   Gait Normal  No digital clubbing or extremity cyanosis   Neurological: He is alert and oriented to person, place, and time. No cranial nerve deficit. Coordination normal.   Skin: Skin is warm and dry. No rash noted.   No subcutaneous nodules noted.   Psychiatric: He has a normal mood and affect. His behavior is normal. Thought content normal.   Alert and oriented to person, place, and situation.   Nursing note and vitals reviewed.

## 2017-11-04 NOTE — HPI
Case of 57 y/o male admitted on 11/3/17. 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 CMV D+/R- on maintenance tacro/mmf/pred. On ppx with bactrim/fluc/valgan. 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 Wolfe and vocal cord dysfunction followed by ENT. Was admitted at this time for follow up surveillance bronchoscopy. Patient reports is at baseline heatlh with no worsening SOB, fevers, chills or nausea. Denies any recent sick contacts. Mentions periodic sputum production when coughing with yellow/green color, particularly after procedures. Has past positive bronchial wash cultures for MRSA  And C. albicans on 8/23/17. Recent culture done on BAL from 11/2/17 growing Enterobacter aerogenes. ID consulted at this time for additional recommendation.

## 2017-11-04 NOTE — CONSULTS
[facility]  Endocrinology  Diabetes Consult Note    Consult Requested by: Darrick Wolfe MD   Reason for admit: Lung transplant rejection    HISTORY OF PRESENT ILLNESS:  Reason for Consult: Management of steroid-induced hyperglycemia     Home Diabetes Medications:  None    How often checking glucose at home? Occasionally  BG readings on regimen: 200s    HPI:   56 year old gentleman with pulmonary sarcoidosis diagnosed in the early 2000's. He is now s/p bilateral lung transplant. He was admitted on 11/3 for transplant rejection, and has been placed on pulse dose methylprednisolone (1350 mg - first dose last night, second dose this AM), and has developed steroid-induced hyperglycemia. He had similar steroid-induced hyperglycemia in August, for which our service was consulted. His insulin requirements were relatively minimal even with high dose steroids, and he was weaned off insulin completely at discharge. He reports receiving a 3 day course of pulse steroids in the past few months and his glucose was in the 300s at that time. He was tapered to prednisone (most recent dose 20 mg at home), and his glucose has been running in the low-mid 200s. He has a normal appetite, and he feels his breathing is at baseline. Denies any other complaints.          Interval HPI:   Overnight events: Received pulse dose MPDN overnight. Glucose 365 this AM.  Eating:   Hasn't eaten yet, about to eat breakfast.  Nausea: No  Hypoglycemia and intervention: No  Fever: No    PMH, PSH, FH, SH updated and reviewed     Review of Systems   Constitutional: Negative for unexpected weight change.   Eyes: Negative for visual disturbance.   Respiratory: Positive for shortness of breath (not worse than baseline).    Cardiovascular: Negative for chest pain.   Gastrointestinal: Negative for abdominal pain.   Genitourinary: Negative for urgency.   Musculoskeletal: Negative for arthralgias.        Occasional leg swelling, not  currently   Skin: Negative for wound.   Neurological: Negative for headaches.   Hematological: Does not bruise/bleed easily.   Psychiatric/Behavioral: Negative for sleep disturbance.       PHYSICAL EXAMINATION:  Vitals:    11/04/17 0732   BP: (!) 148/93   Pulse: 99   Resp: 18   Temp: 97.6 °F (36.4 °C)     Body mass index is 27.06 kg/m².    Physical Exam   Constitutional: He is oriented to person, place, and time. He appears well-developed.   HENT:   Right Ear: External ear normal.   Left Ear: External ear normal.   Nose: Nose normal.   Hearing grossly normal  Dentition grossly normal   Cardiovascular: Normal rate.    No murmur heard.  Pulmonary/Chest: Effort normal. No respiratory distress. He has no wheezes.   Musculoskeletal: He exhibits no edema.   Gait Normal  No digital clubbing or extremity cyanosis   Neurological: He is alert and oriented to person, place, and time. No cranial nerve deficit. Coordination normal.   Skin: Skin is warm and dry. No rash noted.   No subcutaneous nodules noted.   Psychiatric: He has a normal mood and affect. His behavior is normal. Thought content normal.   Alert and oriented to person, place, and situation.   Nursing note and vitals reviewed.        Labs Reviewed and Include     Recent Labs  Lab 11/04/17  0637   *   CALCIUM 9.1   ALBUMIN 3.2*   PROT 6.1   *   K 5.0   CO2 24      BUN 28*   CREATININE 1.1   ALKPHOS 90   ALT 28   AST 19   BILITOT 0.5     Lab Results   Component Value Date    WBC 15.54 (H) 10/26/2017    HGB 11.6 (L) 10/26/2017    HCT 35.1 (L) 10/26/2017    MCV 91 10/26/2017     10/26/2017     No results for input(s): TSH, FREET4 in the last 168 hours.  Lab Results   Component Value Date    HGBA1C 6.7 (H) 04/24/2017       Nutritional status:   Body mass index is 27.06 kg/m².  Lab Results   Component Value Date    ALBUMIN 3.2 (L) 11/04/2017    ALBUMIN 3.4 (L) 10/26/2017    ALBUMIN 3.9 10/10/2017     No results found for: PREALBUMIN    Estimated  Creatinine Clearance: 81.8 mL/min (based on SCr of 1.1 mg/dL).    Accu-Checks  Recent Labs      11/02/17   0648  11/03/17   2136  11/04/17   0735   POCTGLUCOSE  241*  321*  365*        ASSESSMENT and PLAN    * Lung transplant rejection    On pulse MPDN. Per primary.  Insulin as below.          Steroid-induced hyperglycemia    Change to diabetic diet.    Ordered transition insulin gtt at 1.2 units/hr with stepdown dosing. Novolog 6 units with meals + moderate correction dose.    Will be less aggressive than we typically would based on his relatively lower insulin requirements in August when he got high dose steroids.    Monitor POC qAC, hs, 0200 and adjust insulin accordingly.    Check A1c in AM.    If he is going to be discharged home on maintenance prednisone, he will likely require some insulin. Will need to know the decision on long-term steroids before making definitive recommendations.        Immunosuppression    Will increase insulin requirements.          Adrenal cortical steroids causing adverse effect in therapeutic use    See above.              Plan discussed with patient and RN at bedside.     Bebo Dempsey MD  Endocrinology  [facility]

## 2017-11-04 NOTE — ASSESSMENT & PLAN NOTE
Patient with positive BAL cultures for Enterobacter aerogenes 11/2/17. Would recommend to treat as acute bacterial pneumonia for course of 7-10 days. Will treat empirically with cefepime and await final sensitivity results to adjust management. Patient asymptomatic with no acute changes in chest imagining. Stable WBC and O2 sat 96% at room. Would not see inconvenience with at home treatment.     - Start cefepime 1g IV every 8hrs   - Length 7-10 days   - Will follow final sensitivities results

## 2017-11-04 NOTE — PLAN OF CARE
Problem: Patient Care Overview  Goal: Plan of Care Review  Outcome: Ongoing (interventions implemented as appropriate)  Fall precautions maintained. No injuries noted. Patient up and ambulatory throughout shift. No pain issues. Patient with increasing CBGs (ranging 365-450); endocrinology consulted. Insulin drip started at 1.2 then increased to 1.8 units/hr. CBGs to monitored ac/hs and 0200. SM #2 given today. Cefepime started today. Patient afebrile. Bed locked and low. Side rails up x's two. Call bell in close reach.

## 2017-11-04 NOTE — CONSULTS
Consult received. Full note to follow.    Please call for questions.    Thanks,  Dane Grover MD  Infectious Disease Fellow, PGY-4  Pager: [phone]  [facility]

## 2017-11-05 PROBLEM — A49.8 INFECTION DUE TO ENTEROBACTER AEROGENES: Status: ACTIVE | Noted: 2017-11-05

## 2017-11-05 PROBLEM — T38.0X5A STEROID-INDUCED HYPERGLYCEMIA: Status: RESOLVED | Noted: 2017-08-22 | Resolved: 2017-11-05

## 2017-11-05 PROBLEM — R73.9 STEROID-INDUCED HYPERGLYCEMIA: Status: RESOLVED | Noted: 2017-08-22 | Resolved: 2017-11-05

## 2017-11-05 PROBLEM — E11.9 TYPE 2 DIABETES MELLITUS: Status: ACTIVE | Noted: 2017-11-05

## 2017-11-05 LAB
ALBUMIN SERPL BCP-MCNC: 3.3 G/DL
ALP SERPL-CCNC: 86 U/L
ALT SERPL W/O P-5'-P-CCNC: 27 U/L
ANION GAP SERPL CALC-SCNC: 12 MMOL/L
ANISOCYTOSIS BLD QL SMEAR: SLIGHT
AST SERPL-CCNC: 18 U/L
BASOPHILS NFR BLD: 0 %
BILIRUB SERPL-MCNC: 0.3 MG/DL
BUN SERPL-MCNC: 33 MG/DL
CALCIUM SERPL-MCNC: 9.1 MG/DL
CHLORIDE SERPL-SCNC: 100 MMOL/L
CO2 SERPL-SCNC: 23 MMOL/L
CREAT SERPL-MCNC: 1 MG/DL
DIFFERENTIAL METHOD: ABNORMAL
EOSINOPHIL NFR BLD: 0 %
ERYTHROCYTE [DISTWIDTH] IN BLOOD BY AUTOMATED COUNT: 13.8 %
EST. GFR  (AFRICAN AMERICAN): >60 ML/MIN/1.73 M^2
EST. GFR  (NON AFRICAN AMERICAN): >60 ML/MIN/1.73 M^2
ESTIMATED AVG GLUCOSE: 240 MG/DL
GLUCOSE SERPL-MCNC: 232 MG/DL
HBA1C MFR BLD HPLC: 10 %
HCT VFR BLD AUTO: 35.1 %
HGB BLD-MCNC: 12 G/DL
HYPOCHROMIA BLD QL SMEAR: ABNORMAL
IMM GRANULOCYTES # BLD AUTO: ABNORMAL K/UL
IMM GRANULOCYTES NFR BLD AUTO: ABNORMAL %
LYMPHOCYTES NFR BLD: 2 %
MAGNESIUM SERPL-MCNC: 1.8 MG/DL
MAGNESIUM SERPL-MCNC: 2.2 MG/DL
MCH RBC QN AUTO: 30.1 PG
MCHC RBC AUTO-ENTMCNC: 34.2 G/DL
MCV RBC AUTO: 88 FL
MONOCYTES NFR BLD: 2 %
NEUTROPHILS NFR BLD: 96 %
NRBC BLD-RTO: 0 /100 WBC
OVALOCYTES BLD QL SMEAR: ABNORMAL
PLATELET # BLD AUTO: 434 K/UL
PLATELET BLD QL SMEAR: ABNORMAL
PMV BLD AUTO: 9 FL
POCT GLUCOSE: 198 MG/DL (ref 70–110)
POCT GLUCOSE: 253 MG/DL (ref 70–110)
POCT GLUCOSE: 256 MG/DL (ref 70–110)
POCT GLUCOSE: 273 MG/DL (ref 70–110)
POCT GLUCOSE: 287 MG/DL (ref 70–110)
POIKILOCYTOSIS BLD QL SMEAR: SLIGHT
POLYCHROMASIA BLD QL SMEAR: ABNORMAL
POTASSIUM SERPL-SCNC: 4.1 MMOL/L
PROT SERPL-MCNC: 6.3 G/DL
RBC # BLD AUTO: 3.99 M/UL
SODIUM SERPL-SCNC: 135 MMOL/L
TACROLIMUS BLD-MCNC: 11.6 NG/ML
WBC # BLD AUTO: 19.32 K/UL

## 2017-11-05 PROCEDURE — 20600001 HC STEP DOWN PRIVATE ROOM

## 2017-11-05 PROCEDURE — 63600175 PHARM REV CODE 636 W HCPCS: Performed by: INTERNAL MEDICINE

## 2017-11-05 PROCEDURE — 80197 ASSAY OF TACROLIMUS: CPT

## 2017-11-05 PROCEDURE — 25000003 PHARM REV CODE 250: Performed by: INTERNAL MEDICINE

## 2017-11-05 PROCEDURE — 99233 SBSQ HOSP IP/OBS HIGH 50: CPT | Mod: ,,, | Performed by: INTERNAL MEDICINE

## 2017-11-05 PROCEDURE — 25000003 PHARM REV CODE 250: Performed by: HOSPITALIST

## 2017-11-05 PROCEDURE — 83735 ASSAY OF MAGNESIUM: CPT | Mod: 91

## 2017-11-05 PROCEDURE — 80053 COMPREHEN METABOLIC PANEL: CPT

## 2017-11-05 PROCEDURE — 63600175 PHARM REV CODE 636 W HCPCS: Performed by: NURSE PRACTITIONER

## 2017-11-05 PROCEDURE — 83735 ASSAY OF MAGNESIUM: CPT

## 2017-11-05 PROCEDURE — 25000003 PHARM REV CODE 250: Performed by: NURSE PRACTITIONER

## 2017-11-05 PROCEDURE — 99232 SBSQ HOSP IP/OBS MODERATE 35: CPT | Mod: ,,, | Performed by: INTERNAL MEDICINE

## 2017-11-05 PROCEDURE — 83036 HEMOGLOBIN GLYCOSYLATED A1C: CPT

## 2017-11-05 PROCEDURE — 36415 COLL VENOUS BLD VENIPUNCTURE: CPT

## 2017-11-05 RX ORDER — SIMETHICONE 80 MG
1 TABLET,CHEWABLE ORAL 3 TIMES DAILY PRN
Status: DISCONTINUED | OUTPATIENT
Start: 2017-11-05 | End: 2017-11-06 | Stop reason: HOSPADM

## 2017-11-05 RX ORDER — MYCOPHENOLATE MOFETIL 250 MG/1
1000 CAPSULE ORAL 2 TIMES DAILY
Status: DISCONTINUED | OUTPATIENT
Start: 2017-11-05 | End: 2017-11-06 | Stop reason: HOSPADM

## 2017-11-05 RX ADMIN — ASPIRIN 81 MG: 81 TABLET, COATED ORAL at 08:11

## 2017-11-05 RX ADMIN — FLUCONAZOLE 400 MG: 200 TABLET ORAL at 08:11

## 2017-11-05 RX ADMIN — MYCOPHENOLATE MOFETIL 1000 MG: 250 CAPSULE ORAL at 08:11

## 2017-11-05 RX ADMIN — INSULIN ASPART 3 UNITS: 100 INJECTION, SOLUTION INTRAVENOUS; SUBCUTANEOUS at 02:11

## 2017-11-05 RX ADMIN — AMLODIPINE BESYLATE 5 MG: 5 TABLET ORAL at 08:11

## 2017-11-05 RX ADMIN — GUAIFENESIN 600 MG: 600 TABLET, EXTENDED RELEASE ORAL at 08:11

## 2017-11-05 RX ADMIN — SIMETHICONE CHEW TAB 80 MG 80 MG: 80 TABLET ORAL at 08:11

## 2017-11-05 RX ADMIN — INSULIN ASPART 2 UNITS: 100 INJECTION, SOLUTION INTRAVENOUS; SUBCUTANEOUS at 12:11

## 2017-11-05 RX ADMIN — VALGANCICLOVIR 450 MG: 450 TABLET, FILM COATED ORAL at 08:11

## 2017-11-05 RX ADMIN — INSULIN ASPART 3 UNITS: 100 INJECTION, SOLUTION INTRAVENOUS; SUBCUTANEOUS at 10:11

## 2017-11-05 RX ADMIN — INSULIN ASPART 10 UNITS: 100 INJECTION, SOLUTION INTRAVENOUS; SUBCUTANEOUS at 08:11

## 2017-11-05 RX ADMIN — INSULIN ASPART 6 UNITS: 100 INJECTION, SOLUTION INTRAVENOUS; SUBCUTANEOUS at 05:11

## 2017-11-05 RX ADMIN — FLUTICASONE PROPIONATE 1 SPRAY: 50 SPRAY, METERED NASAL at 08:11

## 2017-11-05 RX ADMIN — MAGNESIUM OXIDE TAB 400 MG (241.3 MG ELEMENTAL MG) 400 MG: 400 (241.3 MG) TAB at 08:11

## 2017-11-05 RX ADMIN — FAMOTIDINE 20 MG: 20 TABLET, FILM COATED ORAL at 08:11

## 2017-11-05 RX ADMIN — SIMETHICONE CHEW TAB 80 MG 80 MG: 80 TABLET ORAL at 02:11

## 2017-11-05 RX ADMIN — INSULIN ASPART 10 UNITS: 100 INJECTION, SOLUTION INTRAVENOUS; SUBCUTANEOUS at 12:11

## 2017-11-05 RX ADMIN — TACROLIMUS 1 MG: 1 CAPSULE ORAL at 05:11

## 2017-11-05 RX ADMIN — TACROLIMUS 1 MG: 1 CAPSULE ORAL at 08:11

## 2017-11-05 RX ADMIN — METHYLPREDNISOLONE SODIUM SUCCINATE: 1 INJECTION, POWDER, LYOPHILIZED, FOR SOLUTION INTRAMUSCULAR; INTRAVENOUS at 08:11

## 2017-11-05 RX ADMIN — CEFEPIME HYDROCHLORIDE 1 G: 1 INJECTION, SOLUTION INTRAVENOUS at 02:11

## 2017-11-05 RX ADMIN — INSULIN ASPART 10 UNITS: 100 INJECTION, SOLUTION INTRAVENOUS; SUBCUTANEOUS at 05:11

## 2017-11-05 RX ADMIN — MAGNESIUM SULFATE IN WATER 2 G: 40 INJECTION, SOLUTION INTRAVENOUS at 08:11

## 2017-11-05 RX ADMIN — CEFEPIME HYDROCHLORIDE 1 G: 1 INJECTION, SOLUTION INTRAVENOUS at 06:11

## 2017-11-05 RX ADMIN — CEFEPIME HYDROCHLORIDE 1 G: 1 INJECTION, SOLUTION INTRAVENOUS at 10:11

## 2017-11-05 RX ADMIN — SODIUM CHLORIDE 1.8 UNITS/HR: 9 INJECTION, SOLUTION INTRAVENOUS at 02:11

## 2017-11-05 RX ADMIN — INSULIN ASPART 6 UNITS: 100 INJECTION, SOLUTION INTRAVENOUS; SUBCUTANEOUS at 08:11

## 2017-11-05 NOTE — PLAN OF CARE
Problem: Patient Care Overview  Goal: Plan of Care Review  Outcome: Ongoing (interventions implemented as appropriate)  - VS WNL throughout evening shift; pt afebrile; pt on room air.  - Pt denies pain.  - Pt is undergoing treatment for respiratory infection with Enterobacter and possible ACR of his transplanted lungs.  - IV abx: Cefepime q8h for a 7-10 day course. Infectious Diseases following.  - Pulse steroids: Solu-Medrol 1,350 mg x 2 of 3 completed. Pt will receive third dose at 0900.  - Pt is on an insulin drip for steroid-induced hyperglycemia: 1.8 Units/hr with transition orders. BG monitorin at 2300 (3 Units Novolog given); 273 at 0200 (3 Units Novolog given). Pt may require home insulin per Endocrinology note.  - Pt refuses Lovenox but ambulates in halls at least one per shift.  - Pt has daughter at beside. Pt is independent and ambulatory. Will continue to monitor.

## 2017-11-05 NOTE — PROGRESS NOTES
"[facility]  Endocrinology  Progress Note    Admit Date: 11/3/2017     Reason for Consult: Management of steroid-induced hyperglycemia     Home Diabetes Medications:  None    How often checking glucose at home? Occasionally  BG readings on regimen: 200s    HPI:   56 year old gentleman with pulmonary sarcoidosis diagnosed in the early . He is now s/p bilateral lung transplant. He was admitted on 11/3 for transplant rejection, and has been placed on pulse dose methylprednisolone (1350 mg - first dose last night, second dose this AM), and has developed steroid-induced hyperglycemia. He had similar steroid-induced hyperglycemia in August, for which our service was consulted. His insulin requirements were relatively minimal even with high dose steroids, and he was weaned off insulin completely at discharge. He reports receiving a 3 day course of pulse steroids in the past few months and his glucose was in the 300s at that time. He was tapered to prednisone (most recent dose 20 mg at home), and his glucose has been running in the low-mid 200s. He has a normal appetite, and he feels his breathing is at baseline. Denies any other complaints.        Interval HPI:   Overnight events: Will get 3/3 methylprednisolone this AM. No complaints.  Eatin%  Nausea: No  Hypoglycemia and intervention: No    /72 (BP Location: Right arm, Patient Position: Lying)   Pulse 81   Temp 97.7 °F (36.5 °C) (Oral)   Resp 18   Ht 5' 11" (1.803 m)   Wt 88 kg (194 lb)   SpO2 98%   BMI 27.06 kg/m²       Labs Reviewed and Include    No results for input(s): GLU, CALCIUM, ALBUMIN, PROT, NA, K, CO2, CL, BUN, CREATININE, ALKPHOS, ALT, AST, BILITOT in the last 24 hours.  Lab Results   Component Value Date    WBC 11.05 2017    HGB 11.7 (L) 2017    HCT 35.1 (L) 2017    MCV 90 2017     (H) 2017     No results for input(s): TSH, FREET4 in the last 168 hours.  Lab Results "   Component Value Date    HGBA1C 6.7 (H) 04/24/2017       Nutritional status:   Body mass index is 27.06 kg/m².  Lab Results   Component Value Date    ALBUMIN 3.2 (L) 11/04/2017    ALBUMIN 3.4 (L) 10/26/2017    ALBUMIN 3.9 10/10/2017     No results found for: PREALBUMIN    Estimated Creatinine Clearance: 81.8 mL/min (based on SCr of 1.1 mg/dL).    Accu-Checks  Recent Labs      11/03/17   2136  11/04/17   0735  11/04/17   1212  11/04/17   1637  11/04/17   2259  11/05/17   0303   POCTGLUCOSE  321*  365*  450*  321*  252*  273*       Current Medications and/or Treatments Impacting Glycemic Control  Immunotherapy:    Immunosuppressants         Stop Route Frequency     tacrolimus capsule 1 mg      -- Oral 2 times daily        Steroids:   Hormones     Start     Stop Route Frequency Ordered    11/03/17 1845  methylPREDNISolone sodium succinate (SOLU-MEDROL) 1,350 mg in dextrose 5 % 100 mL IVPB      11/06 0859 IV Daily 11/03/17 1832        Pressors:    Autonomic Drugs     None        Hyperglycemia/Diabetes Medications:   Antihyperglycemics     Start     Stop Route Frequency Ordered    11/04/17 1645  insulin aspart pen 10 Units      -- SubQ 3 times daily with meals 11/04/17 1507    11/04/17 0945  insulin regular (Humulin R) 100 Units in sodium chloride 0.9% 100 mL infusion      -- IV Continuous 11/04/17 0835    11/04/17 0917  insulin aspart pen 1-10 Units      -- SubQ Before meals & nightly PRN 11/04/17 0817          ASSESSMENT and PLAN    * Acute rejection of lung transplant    On pulse MPDN. Per primary.  Insulin as above.          Steroid-induced hyperglycemia    On diabetic diet.    Increased gtt to 1.8 and scheduled novolog to 10 units qAC yesterday afternoon. Glucose still moderately elevated today, but anticipate this will improve as this is the last dose of pulse steroids this morning. For now, continue the current dosing and will adjust this afternoon based on afternoon POC.    Will adjust insulin for tomorrow AM  pending decision on continued steroids.    Will be less aggressive than we typically would based on his relatively low insulin requirements in August when he got high dose steroids.    Monitor POC qAC, hs, 0200 and adjust insulin accordingly.    A1c ordered for this AM.    If he is going to be discharged home on maintenance prednisone, he will likely require some insulin. Will need to know the decision on long-term steroids before making definitive recommendations.        Immunosuppression    Will increase insulin requirements.              Bebo Dempsey MD  Endocrinology  [facility]

## 2017-11-05 NOTE — PROGRESS NOTES
[facility]  Infectious Disease  Progress Note    Patient Name: Martin Hendrix Jr.  MRN: [id number]  Admission Date: 11/3/2017  Length of Stay: 2 days  Attending Physician: Darrick Wolfe MD  Primary Care Provider: Sukhi Dunham Jr, MD    Isolation Status: No active isolations  Assessment/Plan:      Infection due to Enterobacter aerogenes    55 y/o M h/o sarcoidosis with pulmonary HTN s/p BOLT 8/22/17 (D+/R-,  basiliximab induction, tacro, mmf) c/b A1-A2 rejection after first surveillance bronch s/p solumedrol x 2 slight decrease in FEV1 admitted 11/2 for surveillance bronch found to have scant mucopurulent secretions now growing Enterobacter aerogenes (S to quinolones and CTX, cefepime) with PCR panel from BAL also positive for Moraxella and Acinetobacter baumanii  now admitted for further therapy  - continue cefepime for now and transition to PO ciprofloxacin on discharge to complete 10 -14 day course  - will sign off for now, discussed with Dr. Wolfe, pt may f/u with me in clinic             Anticipated Disposition: pending    Thank you for your consult. I will sign off. Please contact us if you have any additional questions.    Buzz Santillan MD  Infectious Disease  [facility]    Subjective:     Principal Problem:Acute rejection of lung transplant    HPI: Case of 55 y/o male admitted on 11/3/17. 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 CMV D+/R- on maintenance tacro/mmf/pred. On ppx with bactrim/fluc/valgan. 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 Wolfe and vocal cord dysfunction followed by ENT. Was admitted at this time for follow up surveillance bronchoscopy. Patient reports is at baseline heatlh with no worsening SOB, fevers, chills or nausea. Denies any recent sick contacts. Mentions periodic sputum production when coughing with yellow/green color, particularly after  procedures. Has past positive bronchial wash cultures for MRSA  And C. albicans on 8/23/17. Recent culture done on BAL from 11/2/17 growing Enterobacter aerogenes. ID consulted at this time for additional recommendation.     Interval History: afebrile, WBC up to 19K, given steroids     Review of Systems   Constitutional: Negative for activity change, appetite change, chills, fatigue and fever.   HENT: Negative for congestion, dental problem, mouth sores and sinus pressure.    Eyes: Negative for pain, redness and visual disturbance.   Respiratory: Negative for cough, shortness of breath and wheezing.    Cardiovascular: Negative for chest pain and leg swelling.   Gastrointestinal: Negative for abdominal distention, abdominal pain, diarrhea, nausea and vomiting.   Endocrine: Negative for polyuria.   Genitourinary: Negative for decreased urine volume, dysuria and frequency.   Musculoskeletal: Negative for joint swelling.   Skin: Negative for color change.   Allergic/Immunologic: Negative for food allergies.   Neurological: Negative for dizziness, weakness and headaches.   Hematological: Negative for adenopathy.   Psychiatric/Behavioral: Negative for agitation and confusion. The patient is not nervous/anxious.      Objective:     Vital Signs (Most Recent):  Temp: 98.8 °F (37.1 °C) (11/05/17 0738)  Pulse: 86 (11/05/17 0738)  Resp: 16 (11/05/17 0738)  BP: 120/77 (11/05/17 0738)  SpO2: 96 % (11/05/17 0738) Vital Signs (24h Range):  Temp:  [97.4 °F (36.3 °C)-98.8 °F (37.1 °C)] 98.8 °F (37.1 °C)  Pulse:  [] 86  Resp:  [16-18] 16  SpO2:  [96 %-98 %] 96 %  BP: (113-138)/(72-86) 120/77     Weight: 86.8 kg (191 lb 4.8 oz)  Body mass index is 26.68 kg/m².    Estimated Creatinine Clearance: 89.9 mL/min (based on SCr of 1 mg/dL).    Physical Exam   Constitutional: He is oriented to person, place, and time. He appears well-developed and well-nourished.   HENT:   Head: Normocephalic and atraumatic.   Mouth/Throat: Oropharynx is  clear and moist.   Eyes: Conjunctivae are normal.   Neck: Neck supple.   Cardiovascular: Normal rate, regular rhythm and normal heart sounds.    No murmur heard.  Pulmonary/Chest: Effort normal and breath sounds normal. No respiratory distress. He has no wheezes.   Abdominal: Soft. Bowel sounds are normal. He exhibits no distension. There is no tenderness.   Musculoskeletal: Normal range of motion. He exhibits no edema or tenderness.   Lymphadenopathy:     He has no cervical adenopathy.   Neurological: He is alert and oriented to person, place, and time. Coordination normal.   Skin: Skin is warm and dry. No rash noted.   Psychiatric: He has a normal mood and affect. His behavior is normal.       Significant Labs:   CBC:   Recent Labs  Lab 11/04/17  0637 11/05/17  0641   WBC 11.05 19.32*   HGB 11.7* 12.0*   HCT 35.1* 35.1*   * 434*     CMP:   Recent Labs  Lab 11/04/17  0637 11/05/17  0641   * 135*   K 5.0 4.1    100   CO2 24 23   * 232*   BUN 28* 33*   CREATININE 1.1 1.0   CALCIUM 9.1 9.1   PROT 6.1 6.3   ALBUMIN 3.2* 3.3*   BILITOT 0.5 0.3   ALKPHOS 90 86   AST 19 18   ALT 28 27   ANIONGAP 11 12   EGFRNONAA >60.0 >60.0       Significant Imaging: None     11/2 CXR  No significant detrimental interval change in the appearance of the chest since the most recent prior examination of 10/26/17 is appreciated.  No post procedure pneumothorax or pneumomediastinum observed.    Micro  11/2 BAL cultures with enterobacter aerogenes R only to tetracycline    8/22 BAL and donor lung MSSA

## 2017-11-05 NOTE — ASSESSMENT & PLAN NOTE
On diabetic diet.    Increased gtt to 1.8 and scheduled novolog to 10 units qAC yesterday afternoon. Glucose still moderately elevated today, but anticipate this will improve as this is the last dose of pulse steroids this morning. For now, continue the current dosing and will adjust this afternoon based on afternoon POC.    Will adjust insulin for tomorrow AM pending decision on continued steroids.    Will be less aggressive than we typically would based on his relatively low insulin requirements in August when he got high dose steroids.    Monitor POC qAC, hs, 0200 and adjust insulin accordingly.    A1c ordered for this AM.    If he is going to be discharged home on maintenance prednisone, he will likely require some insulin. Will need to know the decision on long-term steroids before making definitive recommendations.

## 2017-11-05 NOTE — ASSESSMENT & PLAN NOTE
57 y/o M h/o sarcoidosis with pulmonary HTN s/p BOLT 8/22/17 (D+/R-,  basiliximab induction, tacro, mmf) c/b A1-A2 rejection after first surveillance bronch s/p solumedrol x 2 slight decrease in FEV1 admitted 11/2 for surveillance bronch found to have scant mucopurulent secretions now growing Enterobacter aerogenes (S to quinolones and CTX, cefepime) with PCR panel from BAL also positive for Moraxella and Acinetobacter baumanii  now admitted for further therapy  - continue cefepime for now and transition to PO ciprofloxacin on discharge to complete 10 -14 day course  - will sign off for now, discussed with Dr. Wolfe, pt may f/u with me in clinic

## 2017-11-05 NOTE — ASSESSMENT & PLAN NOTE
Appreciate input from Endocrinology. Will discuss with them optimizing DM treatment before discharge. Last Hgb A1C is 10.

## 2017-11-05 NOTE — SUBJECTIVE & OBJECTIVE
Subjective:     Interval History:     No acute events overnight. Has tolerated high dose steroids well.     Continuous Infusions:   insulin (HUMAN R) infusion (adults) 1.8 Units/hr (11/04/17 1505)     Scheduled Meds:   amLODIPine  5 mg Oral QHS    aspirin  81 mg Oral Daily    ceFEPime (MAXIPIME) IVPB  1 g Intravenous Q8H    enoxaparin  40 mg Subcutaneous Daily    famotidine  20 mg Oral BID    fluconazole  400 mg Oral Daily    fluticasone  1 spray Each Nare Daily    guaiFENesin  600 mg Oral BID    insulin aspart  10 Units Subcutaneous TIDWM    magnesium oxide  400 mg Oral BID    mycophenolate  1,000 mg Oral BID    [START ON 11/6/2017] sulfamethoxazole-trimethoprim 800-160mg  1 tablet Oral Every Mon, Wed, Fri    tacrolimus  1 mg Oral BID    valGANciclovir  450 mg Oral BID     PRN Meds:acetaminophen, dextrose 50%, dextrose 50%, glucagon (human recombinant), glucose, glucose, insulin aspart, levalbuterol, magnesium sulfate IVPB **AND** magnesium sulfate IVPB, ondansetron, potassium chloride 10% **AND** potassium chloride 10% **AND** potassium chloride 10%    Review of patient's allergies indicates:  No Known Allergies    Review of Systems   Constitutional: Negative for activity change, appetite change, chills, diaphoresis, fatigue, fever and unexpected weight change.   HENT: Negative for dental problem, drooling, ear discharge, ear pain, facial swelling, hearing loss, mouth sores, nosebleeds, postnasal drip, rhinorrhea, sinus pressure, sneezing, sore throat, tinnitus, trouble swallowing and voice change.    Eyes: Negative for photophobia, pain, discharge, redness, itching and visual disturbance.   Respiratory: Negative for apnea, cough, choking, chest tightness, shortness of breath, wheezing and stridor.    Cardiovascular: Negative for chest pain, palpitations and leg swelling.   Gastrointestinal: Negative for abdominal distention, abdominal pain, anal bleeding, blood in stool, constipation, diarrhea,  nausea, rectal pain and vomiting.   Endocrine: Negative for cold intolerance and heat intolerance.   Genitourinary: Negative for difficulty urinating, dysuria, flank pain, frequency and hematuria.   Musculoskeletal: Negative for arthralgias, back pain, gait problem, joint swelling, myalgias, neck pain and neck stiffness.   Skin: Negative for color change.   Neurological: Negative for dizziness, tremors, weakness, light-headedness, numbness and headaches.   Psychiatric/Behavioral: Negative for agitation, hallucinations and sleep disturbance. The patient is not nervous/anxious.      Objective:   Physical Exam   Constitutional: He is oriented to person, place, and time. He appears well-developed and well-nourished. No distress.   HENT:   Head: Normocephalic and atraumatic.   Nose: Nose normal.   Mouth/Throat: Oropharynx is clear and moist. No oropharyngeal exudate.   Eyes: Conjunctivae and EOM are normal. Pupils are equal, round, and reactive to light. No scleral icterus.   Neck: Normal range of motion. Neck supple. No JVD present. No tracheal deviation present. No thyromegaly present.   Cardiovascular: Normal rate, regular rhythm and normal heart sounds.    Pulmonary/Chest: Effort normal and breath sounds normal. No respiratory distress. He has no wheezes. He has no rales. He exhibits no tenderness.   Abdominal: Soft. Bowel sounds are normal. He exhibits no distension. There is no tenderness.   Musculoskeletal: Normal range of motion. He exhibits no edema or tenderness.   Neurological: He is alert and oriented to person, place, and time. He has normal reflexes. No cranial nerve deficit.   Skin: Skin is warm and dry. He is not diaphoretic.   Psychiatric: He has a normal mood and affect.         Vital Signs (Most Recent):  Temp: 98.2 °F (36.8 °C) (11/05/17 1220)  Pulse: 89 (11/05/17 1220)  Resp: 18 (11/05/17 1220)  BP: (!) 148/83 (11/05/17 1220)  SpO2: 98 % (11/05/17 1220) Vital Signs (24h Range):  Temp:  [97.4 °F (36.3  °C)-98.8 °F (37.1 °C)] 98.2 °F (36.8 °C)  Pulse:  [] 89  Resp:  [16-18] 18  SpO2:  [96 %-98 %] 98 %  BP: (113-148)/(72-86) 148/83     Weight: 86.8 kg (191 lb 4.8 oz)  Body mass index is 26.68 kg/m².      Intake/Output Summary (Last 24 hours) at 11/05/17 1252  Last data filed at 11/05/17 1200   Gross per 24 hour   Intake          2342.07 ml   Output                0 ml   Net          2342.07 ml       Significant Labs:  CBC:    Recent Labs  Lab 11/05/17  0641   WBC 19.32*   RBC 3.99*   HGB 12.0*   HCT 35.1*   *   MCV 88   MCH 30.1   MCHC 34.2     BMP:    Recent Labs  Lab 11/05/17  0641   *   K 4.1      CO2 23   BUN 33*   CREATININE 1.0   CALCIUM 9.1      Tacrolimus Levels:    Recent Labs  Lab 11/05/17  0641   TACROLIMUS 11.6     Microbiology:  Microbiology Results (last 7 days)     ** No results found for the last 168 hours. **          I have reviewed all pertinent labs within the past 24 hours.    Diagnostic Results:  n/a

## 2017-11-05 NOTE — SUBJECTIVE & OBJECTIVE
Interval History: afebrile, WBC up to 19K, given steroids     Review of Systems   Constitutional: Negative for activity change, appetite change, chills, fatigue and fever.   HENT: Negative for congestion, dental problem, mouth sores and sinus pressure.    Eyes: Negative for pain, redness and visual disturbance.   Respiratory: Negative for cough, shortness of breath and wheezing.    Cardiovascular: Negative for chest pain and leg swelling.   Gastrointestinal: Negative for abdominal distention, abdominal pain, diarrhea, nausea and vomiting.   Endocrine: Negative for polyuria.   Genitourinary: Negative for decreased urine volume, dysuria and frequency.   Musculoskeletal: Negative for joint swelling.   Skin: Negative for color change.   Allergic/Immunologic: Negative for food allergies.   Neurological: Negative for dizziness, weakness and headaches.   Hematological: Negative for adenopathy.   Psychiatric/Behavioral: Negative for agitation and confusion. The patient is not nervous/anxious.      Objective:     Vital Signs (Most Recent):  Temp: 98.8 °F (37.1 °C) (11/05/17 0738)  Pulse: 86 (11/05/17 0738)  Resp: 16 (11/05/17 0738)  BP: 120/77 (11/05/17 0738)  SpO2: 96 % (11/05/17 0738) Vital Signs (24h Range):  Temp:  [97.4 °F (36.3 °C)-98.8 °F (37.1 °C)] 98.8 °F (37.1 °C)  Pulse:  [] 86  Resp:  [16-18] 16  SpO2:  [96 %-98 %] 96 %  BP: (113-138)/(72-86) 120/77     Weight: 86.8 kg (191 lb 4.8 oz)  Body mass index is 26.68 kg/m².    Estimated Creatinine Clearance: 89.9 mL/min (based on SCr of 1 mg/dL).    Physical Exam   Constitutional: He is oriented to person, place, and time. He appears well-developed and well-nourished.   HENT:   Head: Normocephalic and atraumatic.   Mouth/Throat: Oropharynx is clear and moist.   Eyes: Conjunctivae are normal.   Neck: Neck supple.   Cardiovascular: Normal rate, regular rhythm and normal heart sounds.    No murmur heard.  Pulmonary/Chest: Effort normal and breath sounds normal. No  respiratory distress. He has no wheezes.   Abdominal: Soft. Bowel sounds are normal. He exhibits no distension. There is no tenderness.   Musculoskeletal: Normal range of motion. He exhibits no edema or tenderness.   Lymphadenopathy:     He has no cervical adenopathy.   Neurological: He is alert and oriented to person, place, and time. Coordination normal.   Skin: Skin is warm and dry. No rash noted.   Psychiatric: He has a normal mood and affect. His behavior is normal.       Significant Labs:   CBC:   Recent Labs  Lab 11/04/17 0637 11/05/17  0641   WBC 11.05 19.32*   HGB 11.7* 12.0*   HCT 35.1* 35.1*   * 434*     CMP:   Recent Labs  Lab 11/04/17 0637 11/05/17  0641   * 135*   K 5.0 4.1    100   CO2 24 23   * 232*   BUN 28* 33*   CREATININE 1.1 1.0   CALCIUM 9.1 9.1   PROT 6.1 6.3   ALBUMIN 3.2* 3.3*   BILITOT 0.5 0.3   ALKPHOS 90 86   AST 19 18   ALT 28 27   ANIONGAP 11 12   EGFRNONAA >60.0 >60.0       Significant Imaging: None     11/2 CXR  No significant detrimental interval change in the appearance of the chest since the most recent prior examination of 10/26/17 is appreciated.  No post procedure pneumothorax or pneumomediastinum observed.    Micro  11/2 BAL cultures with enterobacter aerogenes R only to tetracycline    8/22 BAL and donor lung MSSA

## 2017-11-05 NOTE — PROGRESS NOTES
[facility]  Lung Transplant  Progress Note - Floor    Patient Name: Martin Hendrix Jr.  MRN: [id number]  Admission Date: 11/3/2017  Hospital Length of Stay: 2 days  Post-Operative Day: 75  Attending Physician: Darrick Wolfe MD  Primary Care Provider: Sukhi Dunham Jr, MD     Subjective:     Interval History:     No acute events overnight. Has tolerated high dose steroids well.     Continuous Infusions:   insulin (HUMAN R) infusion (adults) 1.8 Units/hr (11/04/17 1505)     Scheduled Meds:   amLODIPine  5 mg Oral QHS    aspirin  81 mg Oral Daily    ceFEPime (MAXIPIME) IVPB  1 g Intravenous Q8H    enoxaparin  40 mg Subcutaneous Daily    famotidine  20 mg Oral BID    fluconazole  400 mg Oral Daily    fluticasone  1 spray Each Nare Daily    guaiFENesin  600 mg Oral BID    insulin aspart  10 Units Subcutaneous TIDWM    magnesium oxide  400 mg Oral BID    mycophenolate  1,000 mg Oral BID    [START ON 11/6/2017] sulfamethoxazole-trimethoprim 800-160mg  1 tablet Oral Every Mon, Wed, Fri    tacrolimus  1 mg Oral BID    valGANciclovir  450 mg Oral BID     PRN Meds:acetaminophen, dextrose 50%, dextrose 50%, glucagon (human recombinant), glucose, glucose, insulin aspart, levalbuterol, magnesium sulfate IVPB **AND** magnesium sulfate IVPB, ondansetron, potassium chloride 10% **AND** potassium chloride 10% **AND** potassium chloride 10%    Review of patient's allergies indicates:  No Known Allergies    Review of Systems   Constitutional: Negative for activity change, appetite change, chills, diaphoresis, fatigue, fever and unexpected weight change.   HENT: Negative for dental problem, drooling, ear discharge, ear pain, facial swelling, hearing loss, mouth sores, nosebleeds, postnasal drip, rhinorrhea, sinus pressure, sneezing, sore throat, tinnitus, trouble swallowing and voice change.    Eyes: Negative for photophobia, pain, discharge, redness, itching and visual disturbance.   Respiratory:  Negative for apnea, cough, choking, chest tightness, shortness of breath, wheezing and stridor.    Cardiovascular: Negative for chest pain, palpitations and leg swelling.   Gastrointestinal: Negative for abdominal distention, abdominal pain, anal bleeding, blood in stool, constipation, diarrhea, nausea, rectal pain and vomiting.   Endocrine: Negative for cold intolerance and heat intolerance.   Genitourinary: Negative for difficulty urinating, dysuria, flank pain, frequency and hematuria.   Musculoskeletal: Negative for arthralgias, back pain, gait problem, joint swelling, myalgias, neck pain and neck stiffness.   Skin: Negative for color change.   Neurological: Negative for dizziness, tremors, weakness, light-headedness, numbness and headaches.   Psychiatric/Behavioral: Negative for agitation, hallucinations and sleep disturbance. The patient is not nervous/anxious.      Objective:   Physical Exam   Constitutional: He is oriented to person, place, and time. He appears well-developed and well-nourished. No distress.   HENT:   Head: Normocephalic and atraumatic.   Nose: Nose normal.   Mouth/Throat: Oropharynx is clear and moist. No oropharyngeal exudate.   Eyes: Conjunctivae and EOM are normal. Pupils are equal, round, and reactive to light. No scleral icterus.   Neck: Normal range of motion. Neck supple. No JVD present. No tracheal deviation present. No thyromegaly present.   Cardiovascular: Normal rate, regular rhythm and normal heart sounds.    Pulmonary/Chest: Effort normal and breath sounds normal. No respiratory distress. He has no wheezes. He has no rales. He exhibits no tenderness.   Abdominal: Soft. Bowel sounds are normal. He exhibits no distension. There is no tenderness.   Musculoskeletal: Normal range of motion. He exhibits no edema or tenderness.   Neurological: He is alert and oriented to person, place, and time. He has normal reflexes. No cranial nerve deficit.   Skin: Skin is warm and dry. He is not  diaphoretic.   Psychiatric: He has a normal mood and affect.         Vital Signs (Most Recent):  Temp: 98.2 °F (36.8 °C) (11/05/17 1220)  Pulse: 89 (11/05/17 1220)  Resp: 18 (11/05/17 1220)  BP: (!) 148/83 (11/05/17 1220)  SpO2: 98 % (11/05/17 1220) Vital Signs (24h Range):  Temp:  [97.4 °F (36.3 °C)-98.8 °F (37.1 °C)] 98.2 °F (36.8 °C)  Pulse:  [] 89  Resp:  [16-18] 18  SpO2:  [96 %-98 %] 98 %  BP: (113-148)/(72-86) 148/83     Weight: 86.8 kg (191 lb 4.8 oz)  Body mass index is 26.68 kg/m².      Intake/Output Summary (Last 24 hours) at 11/05/17 1252  Last data filed at 11/05/17 1200   Gross per 24 hour   Intake          2342.07 ml   Output                0 ml   Net          2342.07 ml       Significant Labs:  CBC:    Recent Labs  Lab 11/05/17  0641   WBC 19.32*   RBC 3.99*   HGB 12.0*   HCT 35.1*   *   MCV 88   MCH 30.1   MCHC 34.2     BMP:    Recent Labs  Lab 11/05/17  0641   *   K 4.1      CO2 23   BUN 33*   CREATININE 1.0   CALCIUM 9.1      Tacrolimus Levels:    Recent Labs  Lab 11/05/17  0641   TACROLIMUS 11.6     Microbiology:  Microbiology Results (last 7 days)     ** No results found for the last 168 hours. **          I have reviewed all pertinent labs within the past 24 hours.    Diagnostic Results:  n/a      Assessment/Plan:     * Acute rejection of lung transplant    Completed high dose steroids 15 mg/kg as treatment for his acute rejection.         Infection due to Enterobacter aerogenes    Continue cefepime and will transition to oral therapy with ciprofloxacin before discharge.         LRTI (lower respiratory tract infection)    Cefepime started for his enterobacter infection. PCR positive for A baumanii and Moraxella. Appreciate input from ID.         Immunosuppression    Will continue tacrolimus, mycophenolate, and prednisone. Increase MMF to 1000 bid.        Type 2 diabetes mellitus    Appreciate input from Endocrinology. Will discuss with them optimizing DM treatment  before discharge. Last Hgb A1C is 10.               Darrick Wolfe MD  Lung Transplant  [facility]

## 2017-11-05 NOTE — PLAN OF CARE
Problem: Patient Care Overview  Goal: Plan of Care Review  Outcome: Ongoing (interventions implemented as appropriate)  POC discussed with patient this AM. Fall precautions maintained. Patient up and independent. Complaints of gas pains this shift; PRN simethicone chewable tablet ordered. Insulin drip at 1.8 units/hr continued -BS more controled ranging 198-256. SM #3 given today. Patient afebrile, IV abx continued. Bed locked and low. Side rails up x's two. Call bell in close reach.

## 2017-11-05 NOTE — SUBJECTIVE & OBJECTIVE
"Interval HPI:   Overnight events: Will get 3/3 methylprednisolone this AM. No complaints.  Eatin%  Nausea: No  Hypoglycemia and intervention: No    /72 (BP Location: Right arm, Patient Position: Lying)   Pulse 81   Temp 97.7 °F (36.5 °C) (Oral)   Resp 18   Ht 5' 11" (1.803 m)   Wt 88 kg (194 lb)   SpO2 98%   BMI 27.06 kg/m²     Labs Reviewed and Include    No results for input(s): GLU, CALCIUM, ALBUMIN, PROT, NA, K, CO2, CL, BUN, CREATININE, ALKPHOS, ALT, AST, BILITOT in the last 24 hours.  Lab Results   Component Value Date    WBC 11.05 2017    HGB 11.7 (L) 2017    HCT 35.1 (L) 2017    MCV 90 2017     (H) 2017     No results for input(s): TSH, FREET4 in the last 168 hours.  Lab Results   Component Value Date    HGBA1C 6.7 (H) 2017       Nutritional status:   Body mass index is 27.06 kg/m².  Lab Results   Component Value Date    ALBUMIN 3.2 (L) 2017    ALBUMIN 3.4 (L) 10/26/2017    ALBUMIN 3.9 10/10/2017     No results found for: PREALBUMIN    Estimated Creatinine Clearance: 81.8 mL/min (based on SCr of 1.1 mg/dL).    Accu-Checks  Recent Labs      17   2136  17   0735  17   1212  17   1637  17   2259  17   0303   POCTGLUCOSE  321*  365*  450*  321*  252*  273*       Current Medications and/or Treatments Impacting Glycemic Control  Immunotherapy:    Immunosuppressants         Stop Route Frequency     tacrolimus capsule 1 mg      -- Oral 2 times daily        Steroids:   Hormones     Start     Stop Route Frequency Ordered    17 1845  methylPREDNISolone sodium succinate (SOLU-MEDROL) 1,350 mg in dextrose 5 % 100 mL IVPB       0859 IV Daily 17 1832        Pressors:    Autonomic Drugs     None        Hyperglycemia/Diabetes Medications:   Antihyperglycemics     Start     Stop Route Frequency Ordered    17 1645  insulin aspart pen 10 Units      -- SubQ 3 times daily with meals 17 1507    " 11/04/17 0945  insulin regular (Humulin R) 100 Units in sodium chloride 0.9% 100 mL infusion      -- IV Continuous 11/04/17 0835    11/04/17 0917  insulin aspart pen 1-10 Units      -- SubQ Before meals & nightly PRN 11/04/17 0817

## 2017-11-06 ENCOUNTER — TELEPHONE (OUTPATIENT)
Dept: ENDOCRINOLOGY | Facility: HOSPITAL | Age: 57
End: 2017-11-06

## 2017-11-06 VITALS
HEART RATE: 87 BPM | TEMPERATURE: 98 F | OXYGEN SATURATION: 99 % | RESPIRATION RATE: 18 BRPM | WEIGHT: 178.56 LBS | SYSTOLIC BLOOD PRESSURE: 139 MMHG | DIASTOLIC BLOOD PRESSURE: 89 MMHG | HEIGHT: 71 IN | BODY MASS INDEX: 25 KG/M2

## 2017-11-06 DIAGNOSIS — E11.65 TYPE 2 DIABETES MELLITUS WITH HYPERGLYCEMIA, WITHOUT LONG-TERM CURRENT USE OF INSULIN: Primary | ICD-10-CM

## 2017-11-06 PROBLEM — E11.9 TYPE 2 DIABETES MELLITUS: Status: RESOLVED | Noted: 2017-11-05 | Resolved: 2017-11-06

## 2017-11-06 LAB
ALBUMIN SERPL BCP-MCNC: 2.8 G/DL
ALP SERPL-CCNC: 68 U/L
ALT SERPL W/O P-5'-P-CCNC: 23 U/L
ANION GAP SERPL CALC-SCNC: 9 MMOL/L
ANISOCYTOSIS BLD QL SMEAR: SLIGHT
AST SERPL-CCNC: 14 U/L
BASOPHILS # BLD AUTO: ABNORMAL K/UL
BASOPHILS NFR BLD: 0 %
BILIRUB SERPL-MCNC: 0.3 MG/DL
BUN SERPL-MCNC: 26 MG/DL
CALCIUM SERPL-MCNC: 8.2 MG/DL
CHLORIDE SERPL-SCNC: 100 MMOL/L
CO2 SERPL-SCNC: 28 MMOL/L
CREAT SERPL-MCNC: 0.8 MG/DL
DIFFERENTIAL METHOD: ABNORMAL
EOSINOPHIL # BLD AUTO: ABNORMAL K/UL
EOSINOPHIL NFR BLD: 0 %
ERYTHROCYTE [DISTWIDTH] IN BLOOD BY AUTOMATED COUNT: 13.6 %
EST. GFR  (AFRICAN AMERICAN): >60 ML/MIN/1.73 M^2
EST. GFR  (NON AFRICAN AMERICAN): >60 ML/MIN/1.73 M^2
FUNGUS SPEC CULT: NORMAL
FUNGUS SPEC CULT: NORMAL
GLUCOSE SERPL-MCNC: 183 MG/DL
HCT VFR BLD AUTO: 33.1 %
HGB BLD-MCNC: 11.1 G/DL
IMM GRANULOCYTES # BLD AUTO: ABNORMAL K/UL
IMM GRANULOCYTES NFR BLD AUTO: ABNORMAL %
LYMPHOCYTES # BLD AUTO: ABNORMAL K/UL
LYMPHOCYTES NFR BLD: 3 %
MAGNESIUM SERPL-MCNC: 1.7 MG/DL
MCH RBC QN AUTO: 30.2 PG
MCHC RBC AUTO-ENTMCNC: 33.5 G/DL
MCV RBC AUTO: 90 FL
MONOCYTES # BLD AUTO: ABNORMAL K/UL
MONOCYTES NFR BLD: 2 %
NEUTROPHILS NFR BLD: 95 %
NRBC BLD-RTO: 0 /100 WBC
PLATELET # BLD AUTO: 355 K/UL
PLATELET BLD QL SMEAR: ABNORMAL
PMV BLD AUTO: 8.9 FL
POCT GLUCOSE: 195 MG/DL (ref 70–110)
POCT GLUCOSE: 210 MG/DL (ref 70–110)
POCT GLUCOSE: 341 MG/DL (ref 70–110)
POTASSIUM SERPL-SCNC: 3.6 MMOL/L
PROT SERPL-MCNC: 5.2 G/DL
RBC # BLD AUTO: 3.67 M/UL
SODIUM SERPL-SCNC: 137 MMOL/L
TACROLIMUS BLD-MCNC: 10 NG/ML
WBC # BLD AUTO: 14.91 K/UL

## 2017-11-06 PROCEDURE — 36415 COLL VENOUS BLD VENIPUNCTURE: CPT

## 2017-11-06 PROCEDURE — 25000003 PHARM REV CODE 250: Performed by: INTERNAL MEDICINE

## 2017-11-06 PROCEDURE — 63600175 PHARM REV CODE 636 W HCPCS: Performed by: INTERNAL MEDICINE

## 2017-11-06 PROCEDURE — 83735 ASSAY OF MAGNESIUM: CPT

## 2017-11-06 PROCEDURE — 80197 ASSAY OF TACROLIMUS: CPT

## 2017-11-06 PROCEDURE — 63600175 PHARM REV CODE 636 W HCPCS: Performed by: NURSE PRACTITIONER

## 2017-11-06 PROCEDURE — 85007 BL SMEAR W/DIFF WBC COUNT: CPT

## 2017-11-06 PROCEDURE — 99238 HOSP IP/OBS DSCHRG MGMT 30/<: CPT | Mod: ,,, | Performed by: INTERNAL MEDICINE

## 2017-11-06 PROCEDURE — 85027 COMPLETE CBC AUTOMATED: CPT

## 2017-11-06 PROCEDURE — 99232 SBSQ HOSP IP/OBS MODERATE 35: CPT | Mod: ,,, | Performed by: NURSE PRACTITIONER

## 2017-11-06 PROCEDURE — 25000003 PHARM REV CODE 250: Performed by: NURSE PRACTITIONER

## 2017-11-06 PROCEDURE — 80053 COMPREHEN METABOLIC PANEL: CPT

## 2017-11-06 RX ORDER — GLIPIZIDE 5 MG/1
5 TABLET, FILM COATED, EXTENDED RELEASE ORAL
Qty: 30 TABLET | Refills: 11 | Status: SHIPPED | OUTPATIENT
Start: 2017-11-06 | End: 2017-11-15

## 2017-11-06 RX ORDER — INSULIN ASPART 100 [IU]/ML
10 INJECTION, SOLUTION INTRAVENOUS; SUBCUTANEOUS
Status: DISCONTINUED | OUTPATIENT
Start: 2017-11-06 | End: 2017-11-06 | Stop reason: HOSPADM

## 2017-11-06 RX ORDER — MYCOPHENOLATE MOFETIL 250 MG/1
1000 CAPSULE ORAL 2 TIMES DAILY
Qty: 240 CAPSULE | Refills: 11 | Status: SHIPPED | OUTPATIENT
Start: 2017-11-06 | End: 2017-11-29 | Stop reason: SDUPTHER

## 2017-11-06 RX ORDER — CIPROFLOXACIN 750 MG/1
750 TABLET, FILM COATED ORAL EVERY 12 HOURS
Qty: 28 TABLET | Refills: 0 | Status: SHIPPED | OUTPATIENT
Start: 2017-11-06 | End: 2017-11-20

## 2017-11-06 RX ORDER — IBUPROFEN 200 MG
16 TABLET ORAL
Status: DISCONTINUED | OUTPATIENT
Start: 2017-11-06 | End: 2017-11-06 | Stop reason: HOSPADM

## 2017-11-06 RX ORDER — IBUPROFEN 200 MG
24 TABLET ORAL
Status: DISCONTINUED | OUTPATIENT
Start: 2017-11-06 | End: 2017-11-06 | Stop reason: HOSPADM

## 2017-11-06 RX ORDER — INSULIN ASPART 100 [IU]/ML
1-10 INJECTION, SOLUTION INTRAVENOUS; SUBCUTANEOUS
Status: DISCONTINUED | OUTPATIENT
Start: 2017-11-06 | End: 2017-11-06 | Stop reason: HOSPADM

## 2017-11-06 RX ORDER — PREDNISONE 10 MG/1
TABLET ORAL
Qty: 100 TABLET | Refills: 11
Start: 2017-11-06 | End: 2017-12-11 | Stop reason: SDUPTHER

## 2017-11-06 RX ORDER — GLUCAGON 1 MG
1 KIT INJECTION
Status: DISCONTINUED | OUTPATIENT
Start: 2017-11-06 | End: 2017-11-06 | Stop reason: HOSPADM

## 2017-11-06 RX ORDER — METFORMIN HYDROCHLORIDE 500 MG/1
500 TABLET, EXTENDED RELEASE ORAL 2 TIMES DAILY WITH MEALS
Qty: 60 TABLET | Refills: 11 | Status: SHIPPED | OUTPATIENT
Start: 2017-11-06 | End: 2017-11-15 | Stop reason: SDUPTHER

## 2017-11-06 RX ADMIN — INSULIN ASPART 8 UNITS: 100 INJECTION, SOLUTION INTRAVENOUS; SUBCUTANEOUS at 12:11

## 2017-11-06 RX ADMIN — INSULIN ASPART 2 UNITS: 100 INJECTION, SOLUTION INTRAVENOUS; SUBCUTANEOUS at 09:11

## 2017-11-06 RX ADMIN — SIMETHICONE CHEW TAB 80 MG 80 MG: 80 TABLET ORAL at 06:11

## 2017-11-06 RX ADMIN — ASPIRIN 81 MG: 81 TABLET, COATED ORAL at 08:11

## 2017-11-06 RX ADMIN — FAMOTIDINE 20 MG: 20 TABLET, FILM COATED ORAL at 08:11

## 2017-11-06 RX ADMIN — INSULIN ASPART 2 UNITS: 100 INJECTION, SOLUTION INTRAVENOUS; SUBCUTANEOUS at 02:11

## 2017-11-06 RX ADMIN — VALGANCICLOVIR 450 MG: 450 TABLET, FILM COATED ORAL at 08:11

## 2017-11-06 RX ADMIN — TACROLIMUS 1 MG: 1 CAPSULE ORAL at 08:11

## 2017-11-06 RX ADMIN — CEFEPIME HYDROCHLORIDE 1 G: 1 INJECTION, SOLUTION INTRAVENOUS at 06:11

## 2017-11-06 RX ADMIN — FLUTICASONE PROPIONATE 1 SPRAY: 50 SPRAY, METERED NASAL at 09:11

## 2017-11-06 RX ADMIN — FLUCONAZOLE 400 MG: 200 TABLET ORAL at 08:11

## 2017-11-06 RX ADMIN — MYCOPHENOLATE MOFETIL 1000 MG: 250 CAPSULE ORAL at 12:11

## 2017-11-06 RX ADMIN — INSULIN DETEMIR 18 UNITS: 100 INJECTION, SOLUTION SUBCUTANEOUS at 12:11

## 2017-11-06 RX ADMIN — GUAIFENESIN 600 MG: 600 TABLET, EXTENDED RELEASE ORAL at 08:11

## 2017-11-06 RX ADMIN — MAGNESIUM OXIDE TAB 400 MG (241.3 MG ELEMENTAL MG) 400 MG: 400 (241.3 MG) TAB at 08:11

## 2017-11-06 RX ADMIN — INSULIN ASPART 10 UNITS: 100 INJECTION, SOLUTION INTRAVENOUS; SUBCUTANEOUS at 09:11

## 2017-11-06 RX ADMIN — INSULIN ASPART 10 UNITS: 100 INJECTION, SOLUTION INTRAVENOUS; SUBCUTANEOUS at 12:11

## 2017-11-06 NOTE — ASSESSMENT & PLAN NOTE
Was treated with cefepime for his enterobacter infection. PCR positive for A baumanii and Moraxella. Appreciate input from ID.

## 2017-11-06 NOTE — PROGRESS NOTES
Admit/Discharge Note     Met with patient and daughter to assess needs. Patient is a 56 y.o.  male, admitted for Lung transplant rejection [T86.810]. Pt is s/p lung txp from 8/22/17.      Patient admitted from home on 11/3/2017 .  At this time, patient presents as alert and oriented x 4, pleasant, good eye contact and asking and answering questions appropriately.  At this time, patients caregiver presents as alert and oriented x 4, pleasant, good eye contact, recall good, concentration/judgement good and asking and answering questions appropriately.    Household/Family Systems     Patient resides with patient's self, at [address]  [facility] [address].  Support system includes pt's mother Susy, daughter Sybil, brother Williams, and sister Albina.  Patient does not have dependents that are need of being cared for.     Patients primary caregiver is Sybil Teixeira, patients daughter, phone number [phone].  Confirmed patients contact information is [phone] (home);     Telephone Information:   Mobile [phone]   .  Additional Significant Others who will Assist with Transplant:  Name: Williams Hendrix  Age: 53  City: [place] State: [place]  Relationship: brother  Does person drive? yes   ([phone])     Name: Albina Teixeira   Age: 46  City: [place] State: [place]  Relationship: Sister   Does person drive? Yes  ([phone])    During admission, patient's caregiver plans to stay in patient's room.  Confirmed patient and patients caregivers do have access to reliable transportation.    Cognitive Status/Learning     Patient reports reading ability as 12th grade and states patient does have difficulty with seeing and and wears glasses.  Patient reports patient learns best by Written material/Verbal explanation/Demonstration/ Hands on practice.   Needed: No.   Highest education level: High School (9-12) or GED    Vocation/Disability   .  Working for Income: No  If no, reason not working:  Disability    Patient is disabled due to Sarcoidosis since 2017.  Prior to disability, patient  was employed as a .    Adherence     Patient reports a high level of adherence to patients health care regimen.  Adherence counseling and education provided. Patient verbalizes understanding.    Substance Use    Patient reports the following substance usage.    Tobacco: none, patient denies any use.  Alcohol: none, patient denies any use.  Illicit Drugs/Non-prescribed Medications: none, patient denies any use.  Patient states clear understanding of the potential impact of substance use.  Substance abstinence/cessation counseling, education and resources provided and reviewed.     Services Utilizing/ADLS    Infusion Service: Prior to admission, patient utilizing? yes [facility]: Prior to admission, patient utilizing? no  DME: Prior to admission, no prior to txp pt had O2 supplies through [facility]  Pulmonary/Cardiac Rehab: Prior to admission, no  Dialysis:  Prior to admission, no  Transplant Specialty Pharmacy:  Prior to admission, no.    Prior to admission, patient reports patient was independent with ADLS and was driving.  Patient reports patient is able to care for self at this time..  Patient indicates a willingness to care for self once medically cleared to do so.    Insurance/Medications    Insured by   Payor/Plan Subscr  Sex Relation Sub. Ins. ID Effective Group Num   1. AET[employer] - AETNA* JAYE BORDEN* 1960 Male  [id number]                                   [address]   2. AETNA - AETNA* JAYE BORDEN* 1960 Male  [id number]                                   [address]      Primary Insurance (for UNOS reporting): Private Insurance  Secondary Insurance (for UNOS reporting): None    Patient reports patient is able to obtain and afford medications at this time and at time of discharge.    Living  Will/Healthcare Power of     Patient states patient does not have a LW and/or HCPA.   provided education regarding LW and HCPA and the completion of forms.    Coping/Mental Health    Patient is coping adequately with the aid of  family members.  Patient denies mental health difficulties.     Discharge Note:    At time of discharge, patient plans to discharge to patient's home or alternate housing of his choosing under the care of daughter or self.  Patients daughter will transport patient.  Per rounds today, expected discharge date is today. Pt discharging home with no needs.  Patient and patients caregiver  verbalize understanding and are involved in treatment planning and discharge process.    Additional Concerns     providing ongoing psychosocial support, education, resources and d/c planning as needed.  SW remains available. Patient verbalizes understanding and agreement with information reviewed, social work availability, and how to access available resources as needed.

## 2017-11-06 NOTE — PROGRESS NOTES
"[facility]  Endocrinology  Progress Note    Admit Date: 11/3/2017     Reason for Consult: Management of steroid-induced hyperglycemia     Home Diabetes Medications:  None    How often checking glucose at home? 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 He is now s/p bilateral lung transplant. He was admitted on 11/3 for transplant rejection and has developed steroid-induced hyperglycemia. He had similar steroid-induced hyperglycemia in August. His insulin requirements were relatively minimal even with high dose steroids, and he was weaned off insulin completely at discharge. Endocrine consulted for management.       Interval HPI:    tolerating diet, likely home today, steroid pulse completed yesterday, no hypoglycemia  Afebrile    /85 (BP Location: Left arm, Patient Position: Sitting)   Pulse 85   Temp 98.1 °F (36.7 °C) (Oral)   Resp 18   Ht 5' 11" (1.803 m)   Wt 81 kg (178 lb 9.2 oz)   SpO2 98%   BMI 24.91 kg/m²       Labs Reviewed and Include      Recent Labs  Lab 11/06/17  0411   *   CALCIUM 8.2*   ALBUMIN 2.8*   PROT 5.2*      K 3.6   CO2 28      BUN 26*   CREATININE 0.8   ALKPHOS 68   ALT 23   AST 14   BILITOT 0.3     Lab Results   Component Value Date    WBC 14.91 (H) 11/06/2017    HGB 11.1 (L) 11/06/2017    HCT 33.1 (L) 11/06/2017    MCV 90 11/06/2017     (H) 11/06/2017     No results for input(s): TSH, FREET4 in the last 168 hours.  Lab Results   Component Value Date    HGBA1C 10.0 (H) 11/05/2017       Nutritional status:   Body mass index is 24.91 kg/m².  Lab Results   Component Value Date    ALBUMIN 2.8 (L) 11/06/2017    ALBUMIN 3.3 (L) 11/05/2017    ALBUMIN 3.2 (L) 11/04/2017     No results found for: PREALBUMIN    Estimated Creatinine Clearance: 112.4 mL/min (based on SCr of 0.8 mg/dL).    Accu-Checks  Recent Labs      11/04/17   1212  11/04/17   1637  " 11/04/17   2259  11/05/17   0303  11/05/17   0736  11/05/17   1204  11/05/17   1657  11/05/17   2215  11/06/17   0220  11/06/17   0735   POCTGLUCOSE  450*  321*  252*  273*  256*  198*  253*  287*  210*  195*       Current Medications and/or Treatments Impacting Glycemic Control  Immunotherapy:  Immunosuppressants         Stop Route Frequency     mycophenolate capsule 1,000 mg      -- Oral 2 times daily     tacrolimus capsule 1 mg      -- Oral 2 times daily        Steroids:   Hormones     None        Pressors:    Autonomic Drugs     None        Hyperglycemia/Diabetes Medications: Antihyperglycemics     Start     Stop Route Frequency Ordered    11/06/17 0900  insulin detemir pen 18 Units      -- SubQ Daily 11/06/17 0742    11/06/17 0841  insulin aspart pen 1-10 Units      -- SubQ Before meals & nightly PRN 11/06/17 0742    11/06/17 0830  insulin aspart pen 10 Units      -- SubQ 3 times daily with meals 11/06/17 0742          ASSESSMENT and PLAN    * Acute rejection of lung transplant    High dose steroid pulse completed 11/5/17           Type 2 diabetes mellitus with hyperglycemia    Bg goal 140-180: d/c insulin transition gtt  Start levemir 18 u qam, Novolog 10 u AC  Anticipate that insulin needs will decrease over next 24 hours  However A1C > 10% this admit    Discharge Planning:  Plan to Start metformin xr 500 mg bid and glipizide 5 mg er qam  Has meter and strips at home  Will need to check bg fasting and pre-supper  Bring log with him tp appt on 11/15 @1pm  Will also schedule for outpatient DM education             Type 2 diabetes mellitus-resolved as of 11/6/2017              Lung transplanted    Per LUT          Immunosuppression    Will increase insulin requirements.          Adrenal cortical steroids causing adverse effect in therapeutic use    Increasing insulin needs              Елена Gross, DNP, NP  Endocrinology  [facility]

## 2017-11-06 NOTE — DISCHARGE SUMMARY
[facility]  Discharge Summary  General Surgery      Admit Date: 11/3/2017    Discharge Date and Time:  11/06/2017 1:08 PM    Attending Physician: Darrick Wolfe MD     Discharge Provider: Irwin Celestin    Reason for Admission: Lung Transplant rejection    Procedures Performed: * No surgery found *    Hospital Course (synopsis of major diagnoses, care, treatment, and services provided during the course of the hospital stay):   Acute rejection of lung transplant     Completed high dose steroids 15 mg/kg as treatment for his acute rejection.        Infection due to Enterobacter aerogenes     Was started on cefepime and will transition to oral therapy with ciprofloxacin upon discharge.        LRTI (lower respiratory tract infection)     Cefepime started for his enterobacter infection. PCR positive for A baumanii and Moraxella. Appreciate input from ID. Switch to ciprofloxacin on discharge       Immunosuppression     Will continue tacrolimus, mycophenolate, and prednisone. Increase MMF to 1000 bid.       Type 2 diabetes mellitus     Appreciate input from Endocrinology.                  Consults: ID and endocrinology    Significant Diagnostic Studies: Labs:   BMP:   Recent Labs  Lab 11/05/17  0641 11/05/17  1313 11/06/17  0411   *  --  183*   *  --  137   K 4.1  --  3.6     --  100   CO2 23  --  28   BUN 33*  --  26*   CREATININE 1.0  --  0.8   CALCIUM 9.1  --  8.2*   MG 1.8 2.2 1.7   , CMP   Recent Labs  Lab 11/05/17  0641 11/06/17  0411   * 137   K 4.1 3.6    100   CO2 23 28   * 183*   BUN 33* 26*   CREATININE 1.0 0.8   CALCIUM 9.1 8.2*   PROT 6.3 5.2*   ALBUMIN 3.3* 2.8*   BILITOT 0.3 0.3   ALKPHOS 86 68   AST 18 14   ALT 27 23   ANIONGAP 12 9   ESTGFRAFRICA >60.0 >60.0   EGFRNONAA >60.0 >60.0    and CBC   Recent Labs  Lab 11/05/17  0641 11/06/17 0411   WBC 19.32* 14.91*   HGB 12.0* 11.1*   HCT 35.1* 33.1*   * 355*     Microbiology:   Blood Culture  No results found for: LABBLOO and Sputum Culture   Lab Results   Component Value Date    GSRESP <10 epithelial cells per low power field. 11/02/2017    GSRESP No WBC's or organisms seen 11/02/2017    GSRESP <10 epithelial cells per low power field. 11/02/2017    GSRESP No WBC's or organisms seen 11/02/2017    RESPIRATORYC ENTEROBACTER AEROGENES  Few   11/02/2017    RESPIRATORYC  11/02/2017     ENTEROBACTER AEROGENES  Few  Normal respiratory mary also present       Radiology: X-Ray: CXR: X-Ray Chest 1 View (CXR): No results found for this visit on 11/03/17. and X-Ray Chest PA and Lateral (CXR): No results found for this visit on 11/03/17.    Final Diagnoses:   Principal Problem: Acute rejection of lung transplant   Secondary Diagnoses:   Active Hospital Problems    Diagnosis  POA    *Acute rejection of lung transplant [T86.810]  Yes     Priority: 1 - High    Lung transplanted [Z94.2]  Not Applicable     Priority: 3     Immunosuppression [D89.9]  Yes     Priority: 4     Type 2 diabetes mellitus with hyperglycemia [E11.65]  Yes    Infection due to Enterobacter aerogenes [A49.8]  Yes      Resolved Hospital Problems    Diagnosis Date Resolved POA    Steroid-induced hyperglycemia [R73.9, T38.0X5A] 11/05/2017 Yes     Priority: 5     Type 2 diabetes mellitus [E11.9] 11/06/2017 Yes       Discharged Condition: stable    Disposition: Home or Self Care    Follow Up/Patient Instructions:     Medications:  Reconciled Home Medications:   Current Discharge Medication List      START taking these medications    Details   ciprofloxacin HCl (CIPRO) 750 MG tablet Take 1 tablet (750 mg total) by mouth every 12 (twelve) hours.  Qty: 28 tablet, Refills: 0      glipiZIDE (GLUCOTROL) 5 MG TR24 Take 1 tablet (5 mg total) by mouth daily with breakfast.  Qty: 30 tablet, Refills: 11      metFORMIN (GLUCOPHAGE-XR) 500 MG 24 hr tablet Take 1 tablet (500 mg total) by mouth 2 (two) times daily with meals.  Qty: 60 tablet, Refills: 11          CONTINUE these medications which have CHANGED    Details   mycophenolate (CELLCEPT) 250 mg Cap Take 4 capsules (1,000 mg total) by mouth 2 (two) times daily. Dose increase.  Qty: 240 capsule, Refills: 11      predniSONE (DELTASONE) 10 MG tablet Take by mouth. Taper:  30 mg x 21 d, 20 mg x 60 d, 15 mg x 30 d, 10 mg x 60 d, then 5 mg daily.  Qty: 100 tablet, Refills: 11         CONTINUE these medications which have NOT CHANGED    Details   ACCU-CHEK DEANNE PLUS METER Misc USE AS DIRECTED  Refills: 0      ACCU-CHEK SOFTCLIX LANCETS Misc CHECK BLOOD GLUCOSE ONCE QD  Refills: 5      amlodipine (NORVASC) 5 MG tablet Take 1 tablet (5 mg total) by mouth once daily.  Qty: 30 tablet, Refills: 11    Associated Diagnoses: Essential hypertension      aspirin (ECOTRIN) 81 MG EC tablet Take 1 tablet (81 mg total) by mouth once daily.  Qty: 30 tablet, Refills: 11      calcium-vitamin D tablet 600 mg-200 units Take 1 tablet by mouth 2 (two) times daily.  Qty: 60 tablet, Refills: 11      famotidine (PEPCID) 20 MG tablet Take 1 tablet (20 mg total) by mouth 2 (two) times daily.  Qty: 60 tablet, Refills: 11      fluconazole (DIFLUCAN) 200 MG Tab Take 2 tablets (400 mg total) by mouth once daily.  Qty: 60 tablet, Refills: 6      fluticasone (FLONASE) 50 mcg/actuation nasal spray 1 spray by Each Nare route once daily.  Qty: 1 Bottle, Refills: 3    Associated Diagnoses: Sinusitis, unspecified chronicity, unspecified location      folic acid (FOLVITE) 1 MG tablet Take 1 tablet (1 mg total) by mouth once daily.  Qty: 30 tablet, Refills: 11      furosemide (LASIX) 40 MG tablet Take 1 tablet (40 mg total) by mouth once daily.  Qty: 30 tablet, Refills: 11    Associated Diagnoses: Edema of both legs      guaifenesin (MUCINEX) 600 mg 12 hr tablet Take 1 tablet (600 mg total) by mouth 2 (two) times daily.  Qty: 60 tablet, Refills: 11      magnesium oxide (MAG-OX) 400 mg tablet Take 1 tablet (400 mg total) by mouth 2 (two) times daily.  Qty: 60  tablet, Refills: 11      multivitamin (THERAGRAN) per tablet Take 1 tablet by mouth once daily.      oxycodone-acetaminophen (PERCOCET) 7.5-325 mg per tablet Take 1 tablet by mouth every 4 (four) hours as needed.  Qty: 40 tablet, Refills: 0      sulfamethoxazole-trimethoprim 800-160mg (BACTRIM DS) 800-160 mg Tab Take 1 tablet by mouth every Mon, Wed, Fri.  Qty: 15 tablet, Refills: 11      tacrolimus (PROGRAF) 0.5 MG Cap Take 2 capsules (1 mg total) by mouth every 12 (twelve) hours.  Qty: 120 capsule, Refills: 11    Associated Diagnoses: Lung replaced by transplant      valganciclovir (VALCYTE) 450 mg Tab Take 1 tablet (450 mg total) by mouth 2 (two) times daily.  Qty: 60 tablet, Refills: 11             Discharge Procedure Orders  Diet general   Order Specific Question Answer Comments   Additional restrictions: Diabetic 2000      Activity as tolerated     Call MD for:  increased confusion or weakness     Call MD for:  persistent dizziness, light-headedness, or visual disturbances     Call MD for:  worsening rash     Call MD for:  severe persistent headache     Call MD for:  difficulty breathing or increased cough     Call MD for:  redness, tenderness, or signs of infection (pain, swelling, redness, odor or green/yellow discharge around incision site)     Call MD for:  severe uncontrolled pain     Call MD for:  persistent nausea and vomiting or diarrhea     Call MD for:  temperature >100.4       Follow-up Information     Follow up On 11/16/2017.               Irwin Celestin NP  Lung Transplant

## 2017-11-06 NOTE — PROGRESS NOTES
Patient being discharged home today.  Discharge instructions and follow up plan reviewed with the patient and his daughter as follows:  1.  Home Insulin therapy to be initiated as ordered by the inpatient Endocrinology team.  Explained that the [facility] [place] nursing staff will provide education re: Insulin administration prior to discharge.  Patient reported that he has a home glucometer and supplies, and he feels comfortable with the procedure for monitoring his blood sugar at home.  Per patient's request, endocrinology follow up care will be requested at an [facility] location (either [place] or [place]).  2.  Return to the lung transplant clinic for labs, chest x-ray, PFT and doctor visit on Thursday, November 16, 2017 as previously scheduled.  Reviewed locations, times, and special instructions (e.g., take morning Prograf dose after blood is drawn) for these appointments.  3.  Repeat bronchoscopy to monitor for rejection and infection will be scheduled in ~ 3-4 weeks.  Patient will be notified of date, time and pre-procedure instructions.  Provided verbal and written instructions re: this follow up plan.  The patient and his daughter denied having any questions.  Both verbalized their understanding of the information discussed and demonstrated their readiness for discharge.

## 2017-11-06 NOTE — PROGRESS NOTES
Food & Nutrition  Education    Diet Education: Type 2 Diabetes  Time Spent: 20 min  Learners: Patient and daughter      Nutrition Education provided with handouts: Type 2 Diabetes Nutrition Therapy, General Healthful Nutrition Therapy      Comments: Pt seen due to HgA1C 10.1%. Pt now diagnosed with T2DM. Provided education to patient and daughter. No previous diabetes education. Pt would benefit from consult to outpatient diabetes educator.       All questions and concerns answered. Dietitian's contact information provided.       Follow-Up: 1x/week    Please Re-consult as needed        Thanks!

## 2017-11-06 NOTE — ASSESSMENT & PLAN NOTE
Bg goal 140-180: d/c insulin transition gtt  Start levemir 18 u qam, Novolog 10 u AC  Anticipate that insulin needs will decrease over next 24 hours  However A1C > 10% this admit    Discharge Planning:  Plan to Start metformin xr 500 mg bid and glipizide 5 mg er qam  Has meter and strips at home  Will need to check bg fasting and pre-supper  Bring log with him tp appt on 11/15 @1pm  Will also schedule for outpatient DM education

## 2017-11-06 NOTE — NURSING
Pt discharged from TSU to home via self and daughter.  Pt ambulated off unit with daughter and all belongings in tow.  Hard copy of AVS given to pt.  Verbal education given to pt and daughter based on AVS; pt and daughter both verbalized understanding.  Pt denied need for wheelchair.  Pt denied pain or other need at time of discharge.  Pt off unit at 1454.

## 2017-11-06 NOTE — SUBJECTIVE & OBJECTIVE
"Interval HPI:    tolerating diet, likely home today, steroid pulse completed yesterday, no hypoglycemia  Afebrile    /85 (BP Location: Left arm, Patient Position: Sitting)   Pulse 85   Temp 98.1 °F (36.7 °C) (Oral)   Resp 18   Ht 5' 11" (1.803 m)   Wt 81 kg (178 lb 9.2 oz)   SpO2 98%   BMI 24.91 kg/m²     Labs Reviewed and Include      Recent Labs  Lab 11/06/17  0411   *   CALCIUM 8.2*   ALBUMIN 2.8*   PROT 5.2*      K 3.6   CO2 28      BUN 26*   CREATININE 0.8   ALKPHOS 68   ALT 23   AST 14   BILITOT 0.3     Lab Results   Component Value Date    WBC 14.91 (H) 11/06/2017    HGB 11.1 (L) 11/06/2017    HCT 33.1 (L) 11/06/2017    MCV 90 11/06/2017     (H) 11/06/2017     No results for input(s): TSH, FREET4 in the last 168 hours.  Lab Results   Component Value Date    HGBA1C 10.0 (H) 11/05/2017       Nutritional status:   Body mass index is 24.91 kg/m².  Lab Results   Component Value Date    ALBUMIN 2.8 (L) 11/06/2017    ALBUMIN 3.3 (L) 11/05/2017    ALBUMIN 3.2 (L) 11/04/2017     No results found for: PREALBUMIN    Estimated Creatinine Clearance: 112.4 mL/min (based on SCr of 0.8 mg/dL).    Accu-Checks  Recent Labs      11/04/17   1212  11/04/17   1637  11/04/17   2259  11/05/17   0303  11/05/17   0736  11/05/17   1204  11/05/17   1657  11/05/17   2215  11/06/17   0220  11/06/17   0735   POCTGLUCOSE  450*  321*  252*  273*  256*  198*  253*  287*  210*  195*       Current Medications and/or Treatments Impacting Glycemic Control  Immunotherapy:  Immunosuppressants         Stop Route Frequency     mycophenolate capsule 1,000 mg      -- Oral 2 times daily     tacrolimus capsule 1 mg      -- Oral 2 times daily        Steroids:   Hormones     None        Pressors:    Autonomic Drugs     None        Hyperglycemia/Diabetes Medications: Antihyperglycemics     Start     Stop Route Frequency Ordered    11/06/17 0900  insulin detemir pen 18 Units      -- SubQ Daily 11/06/17 0742    11/06/17 " 0841  insulin aspart pen 1-10 Units      -- SubQ Before meals & nightly PRN 11/06/17 0742    11/06/17 0830  insulin aspart pen 10 Units      -- SubQ 3 times daily with meals 11/06/17 0742

## 2017-11-07 NOTE — PHYSICIAN QUERY
PT Name: Martin Hendrix Jr.  MR #: [id number]     Physician Query Form - Documentation Clarification      CDS/: Susan Teixeira RN,CCDS             Contact information: [email or web address]    This form is a permanent document in the medical record.     Query Date: November 7, 2017    By submitting this query, we are merely seeking further clarification of documentation. Please utilize your independent clinical judgment when addressing the question(s) below.    The Medical record reflects the following:    Supporting Clinical Findings Location in Medical Record     LRTI (lower respiratory tract infection)     Patient with positive BAL cultures for Enterobacter aerogenes 11/2/17. Would recommend to treat as acute bacterial pneumonia for course of 7-10 days.            11/4 ID note    Infection due to enterobacter aerogenes  Was started on cefepime and will transition to oral therapy with ciprofloxacin upon discharge.    LRTI (lower respiratory tract infection)         11/6 d/c summary                                                                            Doctor, Please specify diagnosis or diagnoses associated with above clinical findings.    Provider Use Only        (  x  )  Bacterial Pneumonia 2/2 enterobacter aerogenes, poa    (    )  LRTI 2/2 enterobacter aerogenes, poa    (    )  Other_____________                                                                                                                       [  ] Clinically undetermined

## 2017-11-08 ENCOUNTER — PATIENT MESSAGE (OUTPATIENT)
Dept: TRANSPLANT | Facility: CLINIC | Age: 57
End: 2017-11-08

## 2017-11-08 NOTE — DISCHARGE SUMMARY
Discharge Medication Note:    Mr. Hendrix is a 56 YOM s/p lung transplant on 8/22/17 secondary to sarcoidosis.      Hospital Course:  Patient was admitted for acute rejection.  Methylpred 15 mg/kg daily x 3 d.  Bronchoscopy done.  Cultured enterobactor and aspergillus (resulted after d/c).  Cefepime therapy administered.       Plan:  - Enterobactor.  Ciprofloxacin 750 mg PO daily.    - Aspergillus sp.  Will discontinue fluconazole and initiate voriconazole therapy.  - IS.  Tacrolimus dose will have to be decreased due to stronger CYP enzyme inhibition of voriconazole.  MMF increased to 1000 mg BID.  Continue pred taper.     Met with Martin Hendrix Jr. at discharge to review discharge medications and to update the blue medication card.       Martin Hendrix Jr.   Home Medication Instructions JESUSITA:[id number]    Printed on:11/08/17 5563   Medication Information                      ACCU-CHEK DEANNE PLUS METER Misc  USE AS DIRECTED             ACCU-CHEK SOFTCLIX LANCETS Misc  CHECK BLOOD GLUCOSE ONCE QD             amlodipine (NORVASC) 5 MG tablet  Take 1 tablet (5 mg total) by mouth once daily.             aspirin (ECOTRIN) 81 MG EC tablet  Take 1 tablet (81 mg total) by mouth once daily.             calcium-vitamin D tablet 600 mg-200 units  Take 1 tablet by mouth 2 (two) times daily.             ciprofloxacin HCl (CIPRO) 750 MG tablet  Take 1 tablet (750 mg total) by mouth every 12 (twelve) hours.             famotidine (PEPCID) 20 MG tablet  Take 1 tablet (20 mg total) by mouth 2 (two) times daily.             fluconazole (DIFLUCAN) 200 MG Tab  Take 2 tablets (400 mg total) by mouth once daily.             fluticasone (FLONASE) 50 mcg/actuation nasal spray  1 spray by Each Nare route once daily.             folic acid (FOLVITE) 1 MG tablet  Take 1 tablet (1 mg total) by mouth once daily.             furosemide (LASIX) 40 MG tablet  Take 1 tablet (40 mg total) by mouth once daily.             glipiZIDE (GLUCOTROL) 5  MG TR24  Take 1 tablet (5 mg total) by mouth daily with breakfast.             guaifenesin (MUCINEX) 600 mg 12 hr tablet  Take 1 tablet (600 mg total) by mouth 2 (two) times daily.             magnesium oxide (MAG-OX) 400 mg tablet  Take 1 tablet (400 mg total) by mouth 2 (two) times daily.             metFORMIN (GLUCOPHAGE-XR) 500 MG 24 hr tablet  Take 1 tablet (500 mg total) by mouth 2 (two) times daily with meals.             multivitamin (THERAGRAN) per tablet  Take 1 tablet by mouth once daily.             mycophenolate (CELLCEPT) 250 mg Cap  Take 4 capsules (1,000 mg total) by mouth 2 (two) times daily. Dose increase.             oxycodone-acetaminophen (PERCOCET) 7.5-325 mg per tablet  Take 1 tablet by mouth every 4 (four) hours as needed.             predniSONE (DELTASONE) 10 MG tablet  Take by mouth. Taper:  30 mg x 21 d, 20 mg x 60 d, 15 mg x 30 d, 10 mg x 60 d, then 5 mg daily.             sulfamethoxazole-trimethoprim 800-160mg (BACTRIM DS) 800-160 mg Tab  Take 1 tablet by mouth every Mon, Wed, Fri.             tacrolimus (PROGRAF) 0.5 MG Cap  Take 2 capsules (1 mg total) by mouth every 12 (twelve) hours.             valganciclovir (VALCYTE) 450 mg Tab  Take 1 tablet (450 mg total) by mouth 2 (two) times daily.                 Pt was provided with prescriptions for the following meds:  Voriconazole will have to be prescribed from outpatient end.      Martin Hendrix Jr. verbalized understanding and had the opportunity to ask questions.

## 2017-11-09 ENCOUNTER — PATIENT MESSAGE (OUTPATIENT)
Dept: TRANSPLANT | Facility: CLINIC | Age: 57
End: 2017-11-09

## 2017-11-09 ENCOUNTER — PATIENT MESSAGE (OUTPATIENT)
Dept: ADMINISTRATIVE | Facility: OTHER | Age: 57
End: 2017-11-09

## 2017-11-09 DIAGNOSIS — B44.9 ASPERGILLUS: Primary | ICD-10-CM

## 2017-11-09 DIAGNOSIS — Z51.81 ENCOUNTER FOR THERAPEUTIC DRUG LEVEL MONITORING: Primary | ICD-10-CM

## 2017-11-09 DIAGNOSIS — Z94.2 LUNG REPLACED BY TRANSPLANT: ICD-10-CM

## 2017-11-09 RX ORDER — VORICONAZOLE 200 MG/1
TABLET, FILM COATED ORAL
Qty: 5 TABLET | Refills: 0 | Status: SHIPPED | OUTPATIENT
Start: 2017-11-10 | End: 2017-11-16 | Stop reason: DRUGHIGH

## 2017-11-09 RX ORDER — VORICONAZOLE 200 MG/1
TABLET, FILM COATED ORAL
Qty: 90 TABLET | Refills: 11 | Status: SHIPPED | OUTPATIENT
Start: 2017-11-11 | End: 2017-11-15

## 2017-11-09 NOTE — TELEPHONE ENCOUNTER
----- Message from Darrick Wolfe MD sent at 11/8/2017  2:42 PM CST -----  He's going to need therapy

## 2017-11-09 NOTE — TELEPHONE ENCOUNTER
Received written orders from Dr. Wolfe for patient to begin anti-fungal treatment.  Medication and dose verified with DIEGO Lyles PharmD and approved by Dr. Wolfe for pt to take Voriconazole 500 mg every 12 hours x 2 doses, then 300 mg twice a day.  Pt advised to discontinue fluconazole.  We will check vori and tac levels on Monday 11/13/17 in [place].  My Chart message sent to patient informing him of the above instructions.  Rx will be sent to his local pharmacy.  Asked patient to respond to message to confirm receipt of message.  Will follow up with a phone call if coordinator does not receive a message from the pt.

## 2017-11-13 ENCOUNTER — LAB VISIT (OUTPATIENT)
Dept: LAB | Facility: HOSPITAL | Age: 57
End: 2017-11-13
Attending: INTERNAL MEDICINE
Payer: COMMERCIAL

## 2017-11-13 DIAGNOSIS — Z94.2 LUNG REPLACED BY TRANSPLANT: ICD-10-CM

## 2017-11-13 DIAGNOSIS — Z51.81 ENCOUNTER FOR THERAPEUTIC DRUG LEVEL MONITORING: ICD-10-CM

## 2017-11-13 LAB
ANION GAP SERPL CALC-SCNC: 8 MMOL/L
BUN SERPL-MCNC: 28 MG/DL
CALCIUM SERPL-MCNC: 9.4 MG/DL
CHLORIDE SERPL-SCNC: 101 MMOL/L
CO2 SERPL-SCNC: 29 MMOL/L
CREAT SERPL-MCNC: 1 MG/DL
EST. GFR  (AFRICAN AMERICAN): >60 ML/MIN/1.73 M^2
EST. GFR  (NON AFRICAN AMERICAN): >60 ML/MIN/1.73 M^2
GLUCOSE SERPL-MCNC: 259 MG/DL
POTASSIUM SERPL-SCNC: 4.5 MMOL/L
SODIUM SERPL-SCNC: 138 MMOL/L

## 2017-11-13 PROCEDURE — 36415 COLL VENOUS BLD VENIPUNCTURE: CPT | Mod: PO

## 2017-11-13 PROCEDURE — 80197 ASSAY OF TACROLIMUS: CPT

## 2017-11-13 PROCEDURE — 80299 QUANTITATIVE ASSAY DRUG: CPT

## 2017-11-13 PROCEDURE — 80048 BASIC METABOLIC PNL TOTAL CA: CPT

## 2017-11-14 DIAGNOSIS — Z94.2 LUNG REPLACED BY TRANSPLANT: ICD-10-CM

## 2017-11-14 LAB — TACROLIMUS BLD-MCNC: 24 NG/ML

## 2017-11-14 RX ORDER — TACROLIMUS 0.5 MG/1
0.5 CAPSULE ORAL EVERY 12 HOURS
Qty: 60 CAPSULE | Refills: 11 | Status: SHIPPED | OUTPATIENT
Start: 2017-11-15 | End: 2017-11-16 | Stop reason: SDUPTHER

## 2017-11-14 NOTE — TELEPHONE ENCOUNTER
----- Message from Darrick Wolfe MD sent at 11/14/2017  1:56 PM CST -----  Hold 2 doses and start 0.5 mg bid

## 2017-11-14 NOTE — TELEPHONE ENCOUNTER
Received written orders from Dr. Wolfe instructing patient to hold 2 doses of Prograf then restart at 0.5 mg bid.  Patient contacted and instructed to hold the next 2 doses of prograf and starting Wednesday 11/15/17 evening take 0.5 mg bid.  Patient will have repeat labs on Thursday 11/16/17 at his clinic visit.  Patient was able to repeat the dose change and verbalized understanding of the above instructions.

## 2017-11-15 ENCOUNTER — CLINICAL SUPPORT (OUTPATIENT)
Dept: DIABETES | Facility: CLINIC | Age: 57
End: 2017-11-15
Payer: COMMERCIAL

## 2017-11-15 ENCOUNTER — OFFICE VISIT (OUTPATIENT)
Dept: ENDOCRINOLOGY | Facility: CLINIC | Age: 57
End: 2017-11-15
Payer: COMMERCIAL

## 2017-11-15 ENCOUNTER — LAB VISIT (OUTPATIENT)
Dept: LAB | Facility: HOSPITAL | Age: 57
End: 2017-11-15
Payer: COMMERCIAL

## 2017-11-15 VITALS
HEIGHT: 71 IN | SYSTOLIC BLOOD PRESSURE: 132 MMHG | HEART RATE: 84 BPM | WEIGHT: 193.88 LBS | BODY MASS INDEX: 27.14 KG/M2 | DIASTOLIC BLOOD PRESSURE: 68 MMHG

## 2017-11-15 DIAGNOSIS — E78.00 PURE HYPERCHOLESTEROLEMIA: Chronic | ICD-10-CM

## 2017-11-15 DIAGNOSIS — E11.65 TYPE 2 DIABETES MELLITUS WITH HYPERGLYCEMIA, WITH LONG-TERM CURRENT USE OF INSULIN: Chronic | ICD-10-CM

## 2017-11-15 DIAGNOSIS — D84.9 IMMUNOSUPPRESSION: ICD-10-CM

## 2017-11-15 DIAGNOSIS — E11.65 TYPE 2 DIABETES MELLITUS WITH HYPERGLYCEMIA, WITH LONG-TERM CURRENT USE OF INSULIN: Primary | Chronic | ICD-10-CM

## 2017-11-15 DIAGNOSIS — Z79.4 TYPE 2 DIABETES MELLITUS WITH HYPERGLYCEMIA, WITH LONG-TERM CURRENT USE OF INSULIN: Primary | Chronic | ICD-10-CM

## 2017-11-15 DIAGNOSIS — T38.0X5D ADVERSE EFFECT OF CORTICOSTEROIDS, SUBSEQUENT ENCOUNTER: ICD-10-CM

## 2017-11-15 DIAGNOSIS — E11.65 TYPE 2 DIABETES MELLITUS WITH HYPERGLYCEMIA, WITHOUT LONG-TERM CURRENT USE OF INSULIN: ICD-10-CM

## 2017-11-15 DIAGNOSIS — Z94.2 LUNG REPLACED BY TRANSPLANT: ICD-10-CM

## 2017-11-15 DIAGNOSIS — Z79.4 TYPE 2 DIABETES MELLITUS WITH HYPERGLYCEMIA, WITH LONG-TERM CURRENT USE OF INSULIN: Chronic | ICD-10-CM

## 2017-11-15 PROBLEM — N17.9 AKI (ACUTE KIDNEY INJURY): Status: RESOLVED | Noted: 2017-08-27 | Resolved: 2017-11-15

## 2017-11-15 LAB — VORICONAZOLE SERPL-MCNC: 2.9 MCG/ML

## 2017-11-15 PROCEDURE — 82570 ASSAY OF URINE CREATININE: CPT

## 2017-11-15 PROCEDURE — G0108 DIAB MANAGE TRN  PER INDIV: HCPCS | Mod: S$GLB,,, | Performed by: INTERNAL MEDICINE

## 2017-11-15 PROCEDURE — 99999 PR PBB SHADOW E&M-EST. PATIENT-LVL V: CPT | Mod: PBBFAC,,, | Performed by: NURSE PRACTITIONER

## 2017-11-15 PROCEDURE — 99214 OFFICE O/P EST MOD 30 MIN: CPT | Mod: S$GLB,,, | Performed by: NURSE PRACTITIONER

## 2017-11-15 RX ORDER — BLOOD SUGAR DIAGNOSTIC
STRIP MISCELLANEOUS
Refills: 5 | COMMUNITY
Start: 2017-10-19 | End: 2017-11-15

## 2017-11-15 RX ORDER — METFORMIN HYDROCHLORIDE 500 MG/1
1000 TABLET, EXTENDED RELEASE ORAL 2 TIMES DAILY WITH MEALS
Qty: 120 TABLET | Refills: 11 | Status: SHIPPED | OUTPATIENT
Start: 2017-11-15 | End: 2018-12-18 | Stop reason: SDUPTHER

## 2017-11-15 RX ORDER — INSULIN LISPRO 100 [IU]/ML
10 INJECTION, SOLUTION INTRAVENOUS; SUBCUTANEOUS
Qty: 1 BOX | Refills: 3 | Status: ON HOLD | OUTPATIENT
Start: 2017-11-15 | End: 2017-11-22 | Stop reason: SDUPTHER

## 2017-11-15 RX ORDER — INSULIN DEGLUDEC 200 U/ML
18 INJECTION, SOLUTION SUBCUTANEOUS NIGHTLY
Qty: 3 ML | Refills: 3 | Status: SHIPPED | OUTPATIENT
Start: 2017-11-15 | End: 2018-10-04 | Stop reason: SDUPTHER

## 2017-11-15 RX ORDER — LANCETS
EACH MISCELLANEOUS
Qty: 350 EACH | Refills: 3 | Status: SHIPPED | OUTPATIENT
Start: 2017-11-15 | End: 2018-07-02 | Stop reason: SDUPTHER

## 2017-11-15 RX ORDER — PRAVASTATIN SODIUM 20 MG/1
20 TABLET ORAL DAILY
Qty: 90 TABLET | Refills: 3 | Status: SHIPPED | OUTPATIENT
Start: 2017-11-15 | End: 2018-11-11 | Stop reason: SDUPTHER

## 2017-11-15 RX ORDER — PEN NEEDLE, DIABETIC 30 GX3/16"
NEEDLE, DISPOSABLE MISCELLANEOUS
Qty: 350 EACH | Refills: 3 | Status: SHIPPED | OUTPATIENT
Start: 2017-11-15 | End: 2019-03-25 | Stop reason: SDUPTHER

## 2017-11-15 NOTE — PROGRESS NOTES
CC:  Diabetes.     HPI: Martin Hendrix Jr. is a 56 y.o. male presents for visit. The patient has had diabetes along with associated comorbidities indicated in the Visit Diagnosis section of this encounter. The patient was diagnosed with Type 2 diabetes in ~2017 on labs.    A1c 6.7% 4/2017 before lung transplant.  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 During initial hospitalizations, BG well controlled without insulin. Now repeat A1c >10%, Recently admitted 11/3/17 and discharged 11/8/17 for treatment of rejection with high dose steroids and discharged by endocrine team on insulin    Today, patient presents with mother with complete BG logs. BG markedly elevated. No readings <200    DIABETES COMPLICATIONS: nephropathy (micro x1)     STANDARDS of CARE:  Statin:  No - will start pravastatin today 20 mg daily   ACEI or ARB:  No - will reassess after MAC at next visit   ASA:  Yes - 81 mg daily   Last eye exam no - to schedule today   Microalbumin/Creatinine ratio:  No results found for: MICALBCREAT      CURRENT A1C:    Hemoglobin A1C   Date Value Ref Range Status   11/05/2017 10.0 (H) 4.0 - 5.6 % Final     Comment:     According to ADA guidelines, hemoglobin A1c <7.0% represents  optimal control in non-pregnant diabetic patients. Different  metrics may apply to specific patient populations.   Standards of Medical Care in Diabetes-2016.  For the purpose of screening for the presence of diabetes:  <5.7%     Consistent with the absence of diabetes  5.7-6.4%  Consistent with increasing risk for diabetes   (prediabetes)  >or=6.5%  Consistent with diabetes  Currently, no consensus exists for use of hemoglobin A1c  for diagnosis of diabetes for children.  This Hemoglobin A1c assay has significant interference with fetal   hemoglobin   (HbF). The results are invalid for patients with abnormal amounts of   HbF,   including those with known Hereditary Persistence   of Fetal Hemoglobin. Heterozygous hemoglobin variants (HbAS,  HbAC,   HbAD, HbAE, HbA2) do not significantly interfere with this assay;   however, presence of multiple variants in a sample may impact the %   interference.     04/24/2017 6.7 (H) 4.5 - 6.2 % Final     Comment:     According to ADA guidelines, hemoglobin A1C <7.0% represents  optimal control in non-pregnant diabetic patients.  Different  metrics may apply to specific populations.   Standards of Medical Care in Diabetes - 2016.  For the purpose of screening for the presence of diabetes:  <5.7%     Consistent with the absence of diabetes  5.7-6.4%  Consistent with increasing risk for diabetes   (prediabetes)  >or=6.5%  Consistent with diabetes  Currently no consensus exists for use of hemoglobin A1C  for diagnosis of diabetes for children.         GOAL A1C: <7%    DM MEDICATIONS USED IN THE PAST: Metformin, Glipizide, MDI while in hospital     CURRENT DIABETES MEDICATIONS: Metformin 500 mg BID, Glipizide 5 mg daily with breakfast    BLOOD GLUCOSE MONITORING:    Checking BG BID          HYPOGLYCEMIA:  No    MEALS:   Eating 2-3 meals per day     Snacks on fruit, chips  Water + lemon, cranberry and grape juice, gatorade (every 2 days), regular soft drinks rarely    MEDICATIONS, ALLERGIES, LABS, VS's, MEDICAL, SURGICAL, SOCIAL, AND FAMILY HISTORY reviewed and updated in the appropriate sections during this encounter    ROS:     Constitutional: Negative for weight change  Endocrine:  + polyuria, polydipsia, nocturia.  HENT: Denies neck swelling, lumps, horseness or trouble swallowing. Denies any personal or family history of thyroid cancer.    Eyes: Negative for visual disturbance.   Respiratory: Negative for cough or shortness or breath.  Gastrointestinal: Negative for nausea, diarrhea, vomiting, bloating.  + pancreatitis, hospitalized (~2009), denies gastroparesis.  Genitourinary: Negative for urgency, frequency, frequent urinary tract infections.  Neurological: Negative for syncope, no numbness/burning of  "extremities.      All other systems reviewed and are negative.    PE:  Constitutional: /68   Pulse 84   Ht 5' 11" (1.803 m)   Wt 88 kg (193 lb 14.4 oz)   BMI 27.04 kg/m²    Well developed, well nourished, NAD.    Neck:  No trachial deviation present, No neck masses noticed   Thyroid:  No thyromegaly present.  No thyroid tenderness.      Cardiovascular:    Auscultation:  No murmurs or abnormal sounds   Lower extremities:  No edema or cyanosis.     Respiratory:    Effort:  Normal, no use of accessory muscles.   Auscultation: breath sounds normal, no adventitious sounds.  Skin:    Inspection: no rashes, lesion or ulcers, + acanthosis nigracans.   Palpation: no induration or nodules.    Psychiatric:  Judgement and insight good with normal mood and affect.      FOOT EXAMINATION:   Protective Sensation (w/ 10 gram monofilament):  Right: Intact  Left: Intact    Visual Inspection:  Callus -  Bilateral    Pedal Pulses:   Right: Present  Left: Present    Posterior tibialis:   Right:Present  Left: Present    Normal vibratory response to 128 Hz tuning fork bilaterally.       Lab Results   Component Value Date    TSH 1.092 04/24/2017         ______________________________________________________________________     ASSESSMENT and PLAN:    1- Type 2 diabetes with micro x1 and hyperglycemia - A1c to be repeated in 3 months (2/2017), Reviewed A1c and BG goals.  Discussed diagnosis of DM, progression of disease, long term complications and tx options, including MDI and increasing Metformin. Increase Metformin to 1000 mg BID, d/c Glipizide. Start Tresiba 18 units nightly, Humalog 10 units AC (plus correction scale 150 +1 if understand with DE)    Cannot use DPP-4 or GLP-1 medications due to pancreatitis, caution with SGLT-2 at this time, due to h/o AVILA and current high BG       Instructed to monitor BG ac/hs, document on BG logs, and bring complete BG logs to all visits.  - instructed to email me for any BG <80  Diabetes " education today for MDI training and MNT (may need 2 separate visits)    Return in about 1 week (around 11/22/2017). for BG log review, then f/u in 3 months with below labs before    Orders Placed This Encounter   Procedures    Comprehensive metabolic panel    Lipid panel    Hemoglobin A1c    Microalbumin/creatinine urine ratio    Ambulatory consult to Ophthalmology     2 - ADDENDUM - Microalbuminuria x1 - repeat in 3 months, needs improved BG control    2- s/p lung transplant on immunosuppression and steroids - steroid taper as follows:  20 mg until 11/19  15 mg 11/20 =- 12/19  10 mg 12/20 - 2/18  5 mg daily 2/19 and stay on this dose    3- Hyperlipidemia -  LFT's WNL. Start Pravastatin 20 mg daily. Use of pravastatin due to interaction with transplant meds with lipitor, recheck with RTC    Lab Results   Component Value Date    LDLCALC 146.2 04/24/2017

## 2017-11-15 NOTE — PROGRESS NOTES
ADiabetes Education  Author: Berta Tay RD, CDE  Date: 11/15/2017    Diabetes Education Visit  Diabetes Education Record Assessment/Progress: Initial    Recent lung transplant. About two years ago reports MD telling him he had prediabetes and giving him a meter and asking him to check and let him know if BG increased. Now with a1c of 10%. Seen earlier today by JULEE Bertrand NP and started on Tresiba 18 units hs and humalog 10 units plus sliding scale with meals. Has coupon cards for both and also given one touch verio meter today and instructed on use.     With BG logs, we reviewed use of sliding scale with humalog. Using demo pens, we reviewed injection technique and meal/med dosing of humalog. Reviewed s/s hypoglycemia and treatment.     Lab Results   Component Value Date    HGBA1C 10.0 (H) 11/05/2017         Diabetes Type  Diabetes Type : Type II    Diabetes History  Diabetes Diagnosis: 0-1 year    Social History  Alcohol Use: Never    Food intake:  Cooks and also eats at corner store. Drinks unsweet tea or water with lemon throughout the day.     Reviewed reading food labels and serving sizes using food models.    DDS-2 Score  ( > 3 = SIGNIFICANT DISTRESS): 1    Barriers to Change  Barriers to Change: None  Learning Challenges : None    Readiness to Learn   Readiness to Learn : Eager    Cultural Influences  Cultural Influences: No    Diabetes Education Assessment/Progress  Diabetes Disease Process (diabetes disease process and treatment options): Discussion, Individual Session, Written Materials Provided  Nutrition (Incorporating nutritional management into one's lifestyle): Discussion, Written Materials Provided, Individual Session  Medications (states correct name, dose, onset, peak, duration, side effects & timing of meds): Discussion, Individual Session, Written Materials Provided, Demonstration, Return Demonstration  Monitoring (monitoring blood glucose/other parameters & using results): Discussion,  Individual Session, Written Materials Provided, Demonstration  Diabetes Distress and Support Systems: Discussion    Goals  Patient has selected/evaluated goals during today's session: Yes, selected  Medications: Set- take insulin as prescribed    Diabetes Meal Plan  Carbohydrate Per Meal: 45-60g  Carbohydrate Per Snack : 7-15g    Education Units of Time   Time Spent: 60 min    Will return for f/u prn.    Health Maintenance Topics with due status: Not Due       Topic Last Completion Date    Lipid Panel 04/24/2017    Hemoglobin A1c 11/05/2017     Health Maintenance Due   Topic Date Due    Pneumococcal PCV13 (High Risk) (1 - PCV13 Required) 11/30/1963    Foot Exam  11/30/1972    Eye Exam  11/30/1972    Urine Microalbumin  11/30/1972    TETANUS VACCINE  11/30/1980    Pneumococcal PPSV23 (High Risk) (1) 11/30/1980    Colonoscopy  11/30/2012    Influenza Vaccine  08/01/2017

## 2017-11-15 NOTE — PATIENT INSTRUCTIONS
NO juice, NO regular gatorade    For electrolye drink, can drink powerade ZERO     Also can drink crystal light     Start Tresiba insulin 18 units nightly, starting tonight (long acting insulin)    Start Humalog 10 units before every meal     Increase metformin to 1000 mg twice daily with meals     Check your blood sugar before every meal and before bedtime and bring logs next week     Message me through my [facility] if you ever have a sugar <80

## 2017-11-16 ENCOUNTER — HOSPITAL ENCOUNTER (OUTPATIENT)
Dept: RADIOLOGY | Facility: HOSPITAL | Age: 57
Discharge: HOME | End: 2017-11-16
Attending: INTERNAL MEDICINE
Payer: COMMERCIAL

## 2017-11-16 ENCOUNTER — PATIENT MESSAGE (OUTPATIENT)
Dept: TRANSPLANT | Facility: CLINIC | Age: 57
End: 2017-11-16

## 2017-11-16 ENCOUNTER — OFFICE VISIT (OUTPATIENT)
Dept: TRANSPLANT | Facility: CLINIC | Age: 57
End: 2017-11-16
Payer: COMMERCIAL

## 2017-11-16 ENCOUNTER — HOSPITAL ENCOUNTER (OUTPATIENT)
Dept: PULMONOLOGY | Facility: CLINIC | Age: 57
Discharge: HOME | End: 2017-11-16
Payer: COMMERCIAL

## 2017-11-16 ENCOUNTER — OFFICE VISIT (OUTPATIENT)
Dept: INFECTIOUS DISEASES | Facility: CLINIC | Age: 57
End: 2017-11-16
Payer: COMMERCIAL

## 2017-11-16 VITALS
OXYGEN SATURATION: 98 % | HEART RATE: 89 BPM | TEMPERATURE: 97 F | SYSTOLIC BLOOD PRESSURE: 131 MMHG | WEIGHT: 194 LBS | DIASTOLIC BLOOD PRESSURE: 81 MMHG | RESPIRATION RATE: 20 BRPM | BODY MASS INDEX: 27.77 KG/M2 | HEIGHT: 70 IN

## 2017-11-16 VITALS
HEIGHT: 71 IN | TEMPERATURE: 98 F | WEIGHT: 196 LBS | HEART RATE: 95 BPM | BODY MASS INDEX: 27.44 KG/M2 | SYSTOLIC BLOOD PRESSURE: 131 MMHG | DIASTOLIC BLOOD PRESSURE: 74 MMHG

## 2017-11-16 DIAGNOSIS — Z94.2 LUNG REPLACED BY TRANSPLANT: ICD-10-CM

## 2017-11-16 DIAGNOSIS — A49.8 INFECTION DUE TO ENTEROBACTER AEROGENES: Primary | ICD-10-CM

## 2017-11-16 DIAGNOSIS — D84.9 IMMUNOSUPPRESSION: ICD-10-CM

## 2017-11-16 DIAGNOSIS — B44.1 PULMONARY ASPERGILLOMA: ICD-10-CM

## 2017-11-16 DIAGNOSIS — Z94.2 LUNG TRANSPLANTED: ICD-10-CM

## 2017-11-16 DIAGNOSIS — Z48.298 ENCOUNTER FOLLOWING ORGAN TRANSPLANT: Primary | ICD-10-CM

## 2017-11-16 DIAGNOSIS — Z79.2 PROPHYLACTIC ANTIBIOTIC: ICD-10-CM

## 2017-11-16 DIAGNOSIS — B44.1 PULMONARY ASPERGILLOSIS: ICD-10-CM

## 2017-11-16 LAB
CREAT UR-MCNC: 49 MG/DL
MICROALBUMIN UR DL<=1MG/L-MCNC: 28 UG/ML
MICROALBUMIN/CREATININE RATIO: 57.1 UG/MG
PRE FEV1 FVC: 84
PRE FEV1: 2.53
PRE FVC: 3
PREDICTED FEV1 FVC: 81
PREDICTED FEV1: 3.74
PREDICTED FVC: 4.6

## 2017-11-16 PROCEDURE — 71020 XR CHEST PA AND LATERAL: CPT | Mod: TC

## 2017-11-16 PROCEDURE — 99999 PR PBB SHADOW E&M-EST. PATIENT-LVL III: CPT | Mod: PBBFAC,,, | Performed by: INTERNAL MEDICINE

## 2017-11-16 PROCEDURE — 94010 BREATHING CAPACITY TEST: CPT | Mod: S$GLB,,, | Performed by: INTERNAL MEDICINE

## 2017-11-16 PROCEDURE — 71020 XR CHEST PA AND LATERAL: CPT | Mod: 26,,, | Performed by: RADIOLOGY

## 2017-11-16 PROCEDURE — 99215 OFFICE O/P EST HI 40 MIN: CPT | Mod: S$GLB,,, | Performed by: INTERNAL MEDICINE

## 2017-11-16 PROCEDURE — 99214 OFFICE O/P EST MOD 30 MIN: CPT | Mod: 25,S$GLB,, | Performed by: INTERNAL MEDICINE

## 2017-11-16 RX ORDER — VORICONAZOLE 200 MG/1
300 TABLET, FILM COATED ORAL 2 TIMES DAILY
COMMUNITY
End: 2018-02-19 | Stop reason: ALTCHOICE

## 2017-11-16 RX ORDER — TACROLIMUS 0.5 MG/1
0.5 CAPSULE ORAL DAILY
Qty: 30 CAPSULE | Refills: 11 | Status: SHIPPED | OUTPATIENT
Start: 2017-11-16 | End: 2018-02-19 | Stop reason: SDUPTHER

## 2017-11-16 NOTE — PROGRESS NOTES
Infectious Diseases Clinic Note    Subjective:       Patient ID: Martin Hendrix Jr. is a 56 y.o. male.    Chief Complaint: Lung Transplant    HPI    No fevers, no cough, no SOB, unlimited exercise tolerance. Feeling well tolerating cipro well.  Also since last time I saw pt bronch from 11/2 began to grow aspergillus sp and is on voriconazole.      Labs reviewed, LFTs stable, Cr stable, WBC 15, down from 19      Past Medical History:   Diagnosis Date    AVILA (acute kidney injury) 8/27/2017    Atrial fibrillation 8/23/2017    On home oxygen therapy     KRISTYN on CPAP     Osteopenia     Pancreatitis 2009    hospitalized    Pulmonary hypertension     Pure hypercholesterolemia 11/15/2017    Sarcoidosis     Shortness of breath     Type 2 diabetes mellitus 11/5/2017       Social History     Social History    Marital status:      Spouse name: N/A    Number of children: N/A    Years of education: N/A     Occupational History    Not on file.     Social History Main Topics    Smoking status: Never Smoker    Smokeless tobacco: Never Used    Alcohol use No    Drug use: No    Sexual activity: Not on file     Other Topics Concern    Not on file     Social History Narrative    No narrative on file         Current Outpatient Prescriptions:     amlodipine (NORVASC) 5 MG tablet, Take 1 tablet (5 mg total) by mouth once daily., Disp: 30 tablet, Rfl: 11    aspirin (ECOTRIN) 81 MG EC tablet, Take 1 tablet (81 mg total) by mouth once daily., Disp: 30 tablet, Rfl: 11    blood sugar diagnostic Strp, To check BG 4 times daily, to use with insurance preferred meter (one touch verio if possible), Disp: 350 strip, Rfl: 3    calcium-vitamin D tablet 600 mg-200 units, Take 1 tablet by mouth 2 (two) times daily., Disp: 60 tablet, Rfl: 11    ciprofloxacin HCl (CIPRO) 750 MG tablet, Take 1 tablet (750 mg total) by mouth every 12 (twelve) hours., Disp: 28 tablet, Rfl: 0    famotidine (PEPCID) 20 MG tablet, Take 1  "tablet (20 mg total) by mouth 2 (two) times daily., Disp: 60 tablet, Rfl: 11    fluticasone (FLONASE) 50 mcg/actuation nasal spray, 1 spray by Each Nare route once daily., Disp: 1 Bottle, Rfl: 3    folic acid (FOLVITE) 1 MG tablet, Take 1 tablet (1 mg total) by mouth once daily., Disp: 30 tablet, Rfl: 11    furosemide (LASIX) 40 MG tablet, Take 1 tablet (40 mg total) by mouth once daily. (Patient taking differently: Take 40 mg by mouth daily as needed. ), Disp: 30 tablet, Rfl: 11    guaifenesin (MUCINEX) 600 mg 12 hr tablet, Take 1 tablet (600 mg total) by mouth 2 (two) times daily., Disp: 60 tablet, Rfl: 11    insulin degludec (TRESIBA FLEXTOUCH U-200) 200 unit/mL (3 mL) InPn, Inject 18 Units into the skin every evening., Disp: 3 Syringe, Rfl: 3    insulin lispro (HUMALOG KWIKPEN) 100 unit/mL InPn pen, Inject 10 Units into the skin 3 (three) times daily with meals., Disp: 1 Box, Rfl: 3    lancets Misc, To check BG 4 times daily, to use with insurance preferred meter (one touch verio if possible), Disp: 350 each, Rfl: 3    magnesium oxide (MAG-OX) 400 mg tablet, Take 1 tablet (400 mg total) by mouth 2 (two) times daily., Disp: 60 tablet, Rfl: 11    metFORMIN (GLUCOPHAGE-XR) 500 MG 24 hr tablet, Take 2 tablets (1,000 mg total) by mouth 2 (two) times daily with meals., Disp: 120 tablet, Rfl: 11    multivitamin (THERAGRAN) per tablet, Take 1 tablet by mouth once daily., Disp: , Rfl:     mycophenolate (CELLCEPT) 250 mg Cap, Take 4 capsules (1,000 mg total) by mouth 2 (two) times daily. Dose increase., Disp: 240 capsule, Rfl: 11    oxycodone-acetaminophen (PERCOCET) 7.5-325 mg per tablet, Take 1 tablet by mouth every 4 (four) hours as needed., Disp: 40 tablet, Rfl: 0    pen needle, diabetic (BD ULTRA-FINE JIM PEN NEEDLES) 32 gauge x 5/32" Ndle, Uses 4 times daily, on multiple daily insulin injections, Disp: 350 each, Rfl: 3    pravastatin (PRAVACHOL) 20 MG tablet, Take 1 tablet (20 mg total) by mouth once " daily., Disp: 90 tablet, Rfl: 3    predniSONE (DELTASONE) 10 MG tablet, Take by mouth. Taper:  30 mg x 21 d, 20 mg x 60 d, 15 mg x 30 d, 10 mg x 60 d, then 5 mg daily., Disp: 100 tablet, Rfl: 11    sulfamethoxazole-trimethoprim 800-160mg (BACTRIM DS) 800-160 mg Tab, Take 1 tablet by mouth every Mon, Wed, Fri., Disp: 15 tablet, Rfl: 11    valganciclovir (VALCYTE) 450 mg Tab, Take 1 tablet (450 mg total) by mouth 2 (two) times daily., Disp: 60 tablet, Rfl: 11    voriconazole (VFEND) 200 MG Tab, Take 300 mg by mouth 2 (two) times daily. Take 1.5 tablets (total 300 mg) twice a day, Disp: , Rfl:     tacrolimus (PROGRAF) 0.5 MG Cap, Take 1 capsule (0.5 mg total) by mouth once daily. Take in the morning., Disp: 30 capsule, Rfl: 11    Review of Systems   Constitutional: Negative for activity change, appetite change, chills, fatigue and fever.   HENT: Negative for congestion, dental problem, mouth sores and sinus pressure.    Eyes: Negative for pain, redness and visual disturbance.   Respiratory: Negative for cough, shortness of breath and wheezing.    Cardiovascular: Negative for chest pain and leg swelling.   Gastrointestinal: Negative for abdominal distention, abdominal pain, diarrhea, nausea and vomiting.   Endocrine: Negative for polyuria.   Genitourinary: Negative for decreased urine volume, dysuria and frequency.   Musculoskeletal: Negative for joint swelling.   Skin: Negative for color change.   Allergic/Immunologic: Negative for food allergies.   Neurological: Negative for dizziness, weakness and headaches.   Hematological: Negative for adenopathy.   Psychiatric/Behavioral: Negative for agitation and confusion. The patient is not nervous/anxious.            Objective:      Vitals:    11/16/17 1308   BP: 131/74   Pulse: 95   Temp: 97.9 °F (36.6 °C)     Physical Exam   Constitutional: He is oriented to person, place, and time. He appears well-developed and well-nourished.   HENT:   Head: Normocephalic and  atraumatic.   Mouth/Throat: Oropharynx is clear and moist.   Eyes: Conjunctivae are normal.   Neck: Neck supple.   Cardiovascular: Normal rate, regular rhythm and normal heart sounds.    No murmur heard.  Pulmonary/Chest: Effort normal and breath sounds normal. No respiratory distress. He has no wheezes.   Abdominal: Soft. Bowel sounds are normal. He exhibits no distension. There is no tenderness.   Musculoskeletal: Normal range of motion. He exhibits no edema or tenderness.   Lymphadenopathy:     He has no cervical adenopathy.   Neurological: He is alert and oriented to person, place, and time. Coordination normal.   Skin: Skin is warm and dry. No rash noted.   Psychiatric: He has a normal mood and affect. His behavior is normal.           Assessment/Plan:       No diagnosis found.       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

## 2017-11-16 NOTE — TELEPHONE ENCOUNTER
----- Message from Darrick Wolfe MD sent at 11/16/2017 11:55 AM CST -----  D/C Pm tacro dose. Only 0.5 mg daily.

## 2017-11-16 NOTE — TELEPHONE ENCOUNTER
Received written orders from Dr. Wolfe instructing patient to change his dose of Prograf.  Patient is to discontinue the evening dose and starting in the morning take 0.5 mg once a day.  My [facility] message sent to patient notifying him of the above dose change.  Patient will have repeat labs on Monday 11/20/17 at the [place] location.  Asked patient to respond to message to confirm receipt of message.  Followed up with a phone call but did not get an answer.  Will follow up with another phone call if pt does not respond to message.     Able to speak to patient to review the above.  Patient able to repeat the instructions and verbalized understanding.

## 2017-11-19 NOTE — PROGRESS NOTES
LUNG TRANSPLANT RECIPIENT FOLLOW-UP    Reason for Visit: Follow-up after lung transplantation.                                                                                                         Date of Transplant: 8/22/17                                                                               Reason for Transplant: Sarcoidosis with pulmonary hypertension                                                                               Type of Transplant: bilateral sequential lung                                                                               CMV Status: D+ / R-                                                                               Major Complications:   1. Left vocal cord dysfunction  2. 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 is a 56 y.o. year old male with a transplant history as outlined above who presents today for his routine visit after bilateral lung transplant. He says that he feels well. He denies shortness of breath or cough.    Since his last clinic visit he had his biopsies repeated and was still positive for A2 rejection. He was treated again with pulse steroids. Also, Aspergillus was recovered from the BAL and he was started on voriconazole.     Review of Systems   Constitutional: Negative for chills, diaphoresis, fever, malaise/fatigue and weight loss.   HENT: Negative for congestion, ear discharge, ear pain, hearing loss, nosebleeds and sore throat.    Eyes: Negative for blurred vision, double vision and photophobia.   Respiratory: Negative for cough, hemoptysis, sputum production, shortness of breath and wheezing.    Cardiovascular: Negative for chest pain, palpitations, orthopnea, claudication, leg swelling (minimal with prn furosemide) and PND.   Gastrointestinal: Negative for abdominal pain, blood in stool, constipation, diarrhea, heartburn, melena, nausea and  "vomiting.   Genitourinary: Negative for dysuria, flank pain, frequency, hematuria and urgency.   Musculoskeletal: Negative for back pain, falls, joint pain, myalgias and neck pain.   Skin: Negative for itching and rash.   Neurological: Negative for dizziness, tremors, sensory change, loss of consciousness, weakness and headaches.   Endo/Heme/Allergies: Does not bruise/bleed easily.   Psychiatric/Behavioral: Negative for depression, hallucinations and memory loss. The patient is not nervous/anxious and does not have insomnia.      /81 (BP Location: Left arm, Patient Position: Sitting, BP Method: Large (Automatic))   Pulse 89   Temp 96.9 °F (36.1 °C) (Oral)   Resp 20   Ht 5' 10" (1.778 m)   Wt 88 kg (194 lb)   SpO2 98%   BMI 27.84 kg/m²     Physical Exam   Constitutional: He is oriented to person, place, and time and well-developed, well-nourished, and in no distress. No distress.   HENT:   Head: Normocephalic.   Nose: Nose normal.   Mouth/Throat: Oropharynx is clear and moist. No oropharyngeal exudate.   Eyes: Conjunctivae and EOM are normal. Pupils are equal, round, and reactive to light. No scleral icterus.   Neck: Normal range of motion. Neck supple. No JVD present. No tracheal deviation present. No thyromegaly present.   Cardiovascular: Normal rate, regular rhythm and intact distal pulses.  Exam reveals no gallop and no friction rub.    No murmur heard.  Pulmonary/Chest: Effort normal. No stridor. No respiratory distress. He has no wheezes. He has no rales. He exhibits no tenderness.   Abdominal: Soft. Bowel sounds are normal. He exhibits no distension and no mass. There is no tenderness. There is no rebound and no guarding.   Musculoskeletal: Normal range of motion. He exhibits no edema.   Lymphadenopathy:     He has no cervical adenopathy.   Neurological: He is alert and oriented to person, place, and time. No cranial nerve deficit. Gait normal. Coordination normal.   Skin: Skin is warm and dry. No " rash noted. He is not diaphoretic. No erythema. No pallor.   Psychiatric: Mood and affect normal.     Labs:  cbc, cmp, tacrolimus Latest Ref Rng & Units 11/6/2017 11/13/2017 11/16/2017   TACROLIMUS LVL 5.0 - 15.0 ng/mL 10.0 24.0(H) 20.5(H)   WHITE BLOOD CELL COUNT 3.90 - 12.70 K/uL 14.91(H) - 15.95(H)   RBC 4.60 - 6.20 M/uL 3.67(L) - 3.94(L)   HEMOGLOBIN 14.0 - 18.0 g/dL 11.1(L) - 12.0(L)   HEMATOCRIT 40.0 - 54.0 % 33.1(L) - 35.7(L)   MCV 82 - 98 fL 90 - 91   MCH 27.0 - 31.0 pg 30.2 - 30.5   MCHC 32.0 - 36.0 g/dL 33.5 - 33.6   RDW 11.5 - 14.5 % 13.6 - 13.8   PLATELETS 150 - 350 K/uL 355(H) - 291   MPV 9.2 - 12.9 fL 8.9(L) - 8.8(L)   GRAN # 1.8 - 7.7 K/uL - - -   LYMPH # 1.0 - 4.8 K/uL CANCELED - CANCELED   MONO # 0.3 - 1.0 K/uL CANCELED - CANCELED   EOSINOPHIL% 0.0 - 8.0 % 0.0 - 0.0   BASOPHIL% 0.0 - 1.9 % 0.0 - 0.0   DIFFERENTIAL METHOD - Manual - Manual   SODIUM 136 - 145 mmol/L 137 138 138   POTASSIUM 3.5 - 5.1 mmol/L 3.6 4.5 4.3   CHLORIDE 95 - 110 mmol/L 100 101 101   CO2 23 - 29 mmol/L 28 29 28   GLUCOSE 70 - 110 mg/dL 183(H) 259(H) 166(H)   BUN BLD 6 - 20 mg/dL 26(H) 28(H) 43(H)   CREATININE 0.5 - 1.4 mg/dL 0.8 1.0 1.2   CALCIUM 8.7 - 10.5 mg/dL 8.2(L) 9.4 9.4   PROTEIN TOTAL 6.0 - 8.4 g/dL 5.2(L) - 6.6   ALBUMIN 3.5 - 5.2 g/dL 2.8(L) - 3.8   BILIRUBIN TOTAL 0.1 - 1.0 mg/dL 0.3 - 0.4   ALK PHOS 55 - 135 U/L 68 - 73   AST 10 - 40 U/L 14 - 23   ALT 10 - 44 U/L 23 - 24   ANION GAP 8 - 16 mmol/L 9 8 9   EGFR IF AFRICAN AMERICAN >60 mL/min/1.73 m:2 >60.0 >60.0 >60.0   EGFR IF NON-AFRICAN AMERICAN >60 mL/min/1.73 m:2 >60.0 >60.0 >60.0     Pulmonary Function Tests 11/16/2017 10/26/2017 10/10/2017 9/26/2017 9/19/2017 9/11/2017 9/5/2017   FVC 3 2.71 2.84 2.54 2.36 2.29 2.11   FEV1 2.53 2.41 2.57 2.26 2.13 1.97 1.8   TLC (liters) - - - - - - -   DLCO (ml/mmHg sec) - - - - - - -   FVC% 65 59 61 53 51 49 45   FEV1% 67 64 69 58 57 53 48   FEF 25-75 2.51 2.73 3.26 2.69 2.85 2.26 1.86   FEF 25-75% 65 71 85 68 74 59 49    TLC% - - - - - - -   RV - - - - - - -   RV% - - - - - - -   DLCO% - - - - - - -       Imaging:  Results for orders placed during the hospital encounter of 11/16/17   X-Ray Chest PA And Lateral    Narrative Cardiac size is somewhat previous exam and postoperative changes are noted.  Lungs are satisfactorily aerated no infiltrate or acute findings.    Impression  No infiltrate or acute findings      Electronically signed by: Carlton Parker MD  Date:     11/16/17  Time:    07:48            Assessment:  1. Encounter following organ transplant    2. Lung transplanted    3. Immunosuppression    4. Prophylactic antibiotic    5. Pulmonary aspergillosis      Plan:   1. Will continue to monitor his pulmonary functions every four weeks  for now. They have improved. Will repeat bronchoscopy next week to re-evaluate his rejection.     2. Continue tacrolimus, mycophenolate, and prednisone. Adjust tacrolimus as needed.    3. Continue Bactrim, and valganciclovir.    4. Continue voriconazole    5. RTC in 4 weeks

## 2017-11-20 ENCOUNTER — LAB VISIT (OUTPATIENT)
Dept: LAB | Facility: HOSPITAL | Age: 57
End: 2017-11-20
Attending: INTERNAL MEDICINE
Payer: COMMERCIAL

## 2017-11-20 DIAGNOSIS — Z94.2 LUNG REPLACED BY TRANSPLANT: ICD-10-CM

## 2017-11-20 LAB
ANION GAP SERPL CALC-SCNC: 8 MMOL/L
BUN SERPL-MCNC: 32 MG/DL
CALCIUM SERPL-MCNC: 9.6 MG/DL
CHLORIDE SERPL-SCNC: 101 MMOL/L
CO2 SERPL-SCNC: 29 MMOL/L
CREAT SERPL-MCNC: 1 MG/DL
EST. GFR  (AFRICAN AMERICAN): >60 ML/MIN/1.73 M^2
EST. GFR  (NON AFRICAN AMERICAN): >60 ML/MIN/1.73 M^2
GLUCOSE SERPL-MCNC: 133 MG/DL
POTASSIUM SERPL-SCNC: 4.8 MMOL/L
SODIUM SERPL-SCNC: 138 MMOL/L

## 2017-11-20 PROCEDURE — 80197 ASSAY OF TACROLIMUS: CPT

## 2017-11-20 PROCEDURE — 36415 COLL VENOUS BLD VENIPUNCTURE: CPT | Mod: PO

## 2017-11-20 PROCEDURE — 80048 BASIC METABOLIC PNL TOTAL CA: CPT

## 2017-11-21 ENCOUNTER — PATIENT MESSAGE (OUTPATIENT)
Dept: ENDOCRINOLOGY | Facility: CLINIC | Age: 57
End: 2017-11-21

## 2017-11-21 LAB — TACROLIMUS BLD-MCNC: 13.3 NG/ML

## 2017-11-22 ENCOUNTER — OFFICE VISIT (OUTPATIENT)
Dept: ENDOCRINOLOGY | Facility: CLINIC | Age: 57
End: 2017-11-22
Payer: COMMERCIAL

## 2017-11-22 ENCOUNTER — SURGERY (OUTPATIENT)
Age: 57
End: 2017-11-22

## 2017-11-22 ENCOUNTER — HOSPITAL ENCOUNTER (OUTPATIENT)
Facility: HOSPITAL | Age: 57
Discharge: HOME | End: 2017-11-22
Attending: INTERNAL MEDICINE | Admitting: INTERNAL MEDICINE
Payer: COMMERCIAL

## 2017-11-22 VITALS
OXYGEN SATURATION: 96 % | HEIGHT: 71 IN | BODY MASS INDEX: 27.02 KG/M2 | HEART RATE: 91 BPM | DIASTOLIC BLOOD PRESSURE: 84 MMHG | TEMPERATURE: 97 F | SYSTOLIC BLOOD PRESSURE: 116 MMHG | RESPIRATION RATE: 16 BRPM | WEIGHT: 193 LBS

## 2017-11-22 VITALS
BODY MASS INDEX: 26.98 KG/M2 | HEART RATE: 77 BPM | WEIGHT: 192.69 LBS | HEIGHT: 71 IN | SYSTOLIC BLOOD PRESSURE: 132 MMHG | DIASTOLIC BLOOD PRESSURE: 80 MMHG

## 2017-11-22 DIAGNOSIS — E11.65 TYPE 2 DIABETES MELLITUS WITH HYPERGLYCEMIA, WITH LONG-TERM CURRENT USE OF INSULIN: Primary | Chronic | ICD-10-CM

## 2017-11-22 DIAGNOSIS — Z94.2 LUNG REPLACED BY TRANSPLANT: ICD-10-CM

## 2017-11-22 DIAGNOSIS — Z94.2 LUNG REPLACED BY TRANSPLANT: Primary | ICD-10-CM

## 2017-11-22 DIAGNOSIS — Z79.4 TYPE 2 DIABETES MELLITUS WITH HYPERGLYCEMIA, WITH LONG-TERM CURRENT USE OF INSULIN: Primary | Chronic | ICD-10-CM

## 2017-11-22 DIAGNOSIS — T38.0X5D ADVERSE EFFECT OF CORTICOSTEROIDS, SUBSEQUENT ENCOUNTER: ICD-10-CM

## 2017-11-22 DIAGNOSIS — D84.9 IMMUNOSUPPRESSION: ICD-10-CM

## 2017-11-22 DIAGNOSIS — E78.00 PURE HYPERCHOLESTEROLEMIA: Chronic | ICD-10-CM

## 2017-11-22 PROBLEM — E66.09 NON MORBID OBESITY DUE TO EXCESS CALORIES: Status: RESOLVED | Noted: 2017-08-22 | Resolved: 2017-11-22

## 2017-11-22 LAB
APPEARANCE FLD: NORMAL
BODY FLD TYPE: NORMAL
COLOR FLD: COLORLESS
LYMPHOCYTES NFR FLD MANUAL: 72 %
MESOTHL CELL NFR FLD MANUAL: 1 %
MONOS+MACROS NFR FLD MANUAL: 22 %
NEUTROPHILS NFR FLD MANUAL: 5 %
POCT GLUCOSE: 204 MG/DL (ref 70–110)
WBC # FLD: 43 /CU MM

## 2017-11-22 PROCEDURE — 87186 SC STD MICRODIL/AGAR DIL: CPT

## 2017-11-22 PROCEDURE — 88305 TISSUE EXAM BY PATHOLOGIST: CPT | Performed by: PATHOLOGY

## 2017-11-22 PROCEDURE — 99153 MOD SED SAME PHYS/QHP EA: CPT | Performed by: INTERNAL MEDICINE

## 2017-11-22 PROCEDURE — 31628 BRONCHOSCOPY/LUNG BX EACH: CPT | Performed by: INTERNAL MEDICINE

## 2017-11-22 PROCEDURE — 99999 PR PBB SHADOW E&M-EST. PATIENT-LVL V: CPT | Mod: PBBFAC,,, | Performed by: NURSE PRACTITIONER

## 2017-11-22 PROCEDURE — 88313 SPECIAL STAINS GROUP 2: CPT | Mod: 26,,, | Performed by: PATHOLOGY

## 2017-11-22 PROCEDURE — 87641 MR-STAPH DNA AMP PROBE: CPT

## 2017-11-22 PROCEDURE — 87116 MYCOBACTERIA CULTURE: CPT | Mod: 59

## 2017-11-22 PROCEDURE — 87305 ASPERGILLUS AG IA: CPT

## 2017-11-22 PROCEDURE — 88305 TISSUE EXAM BY PATHOLOGIST: CPT | Mod: 26,,, | Performed by: PATHOLOGY

## 2017-11-22 PROCEDURE — 88312 SPECIAL STAINS GROUP 1: CPT | Mod: 26,,, | Performed by: PATHOLOGY

## 2017-11-22 PROCEDURE — 87015 SPECIMEN INFECT AGNT CONCNTJ: CPT

## 2017-11-22 PROCEDURE — 25000003 PHARM REV CODE 250: Performed by: INTERNAL MEDICINE

## 2017-11-22 PROCEDURE — 31623 DX BRONCHOSCOPE/BRUSH: CPT | Performed by: INTERNAL MEDICINE

## 2017-11-22 PROCEDURE — 63600175 PHARM REV CODE 636 W HCPCS: Performed by: INTERNAL MEDICINE

## 2017-11-22 PROCEDURE — 99214 OFFICE O/P EST MOD 30 MIN: CPT | Mod: S$GLB,,, | Performed by: NURSE PRACTITIONER

## 2017-11-22 PROCEDURE — 82962 GLUCOSE BLOOD TEST: CPT

## 2017-11-22 PROCEDURE — 87070 CULTURE OTHR SPECIMN AEROBIC: CPT

## 2017-11-22 PROCEDURE — 87205 SMEAR GRAM STAIN: CPT

## 2017-11-22 PROCEDURE — 89051 BODY FLUID CELL COUNT: CPT

## 2017-11-22 PROCEDURE — 31624 DX BRONCHOSCOPE/LAVAGE: CPT | Performed by: INTERNAL MEDICINE

## 2017-11-22 PROCEDURE — 31623 DX BRONCHOSCOPE/BRUSH: CPT | Mod: 59,LT,, | Performed by: INTERNAL MEDICINE

## 2017-11-22 PROCEDURE — 87077 CULTURE AEROBIC IDENTIFY: CPT

## 2017-11-22 PROCEDURE — 87102 FUNGUS ISOLATION CULTURE: CPT | Mod: 59

## 2017-11-22 PROCEDURE — 31628 BRONCHOSCOPY/LUNG BX EACH: CPT | Mod: RT,,, | Performed by: INTERNAL MEDICINE

## 2017-11-22 PROCEDURE — 99152 MOD SED SAME PHYS/QHP 5/>YRS: CPT | Performed by: INTERNAL MEDICINE

## 2017-11-22 PROCEDURE — 31624 DX BRONCHOSCOPE/LAVAGE: CPT | Mod: 59,RT,, | Performed by: INTERNAL MEDICINE

## 2017-11-22 PROCEDURE — 27201011 HC FORCEPS DISPOSABLE: Performed by: INTERNAL MEDICINE

## 2017-11-22 RX ORDER — CIPROFLOXACIN 750 MG/1
750 TABLET, FILM COATED ORAL
COMMUNITY
End: 2017-12-18 | Stop reason: ALTCHOICE

## 2017-11-22 RX ORDER — INSULIN LISPRO 100 [IU]/ML
INJECTION, SOLUTION INTRAVENOUS; SUBCUTANEOUS
Qty: 1 BOX | Refills: 3 | Status: SHIPPED | OUTPATIENT
Start: 2017-11-22 | End: 2017-12-13 | Stop reason: SDUPTHER

## 2017-11-22 RX ORDER — LIDOCAINE HYDROCHLORIDE 20 MG/ML
INJECTION, SOLUTION INFILTRATION; PERINEURAL CODE/TRAUMA/SEDATION MEDICATION
Status: COMPLETED | OUTPATIENT
Start: 2017-11-22 | End: 2017-11-22

## 2017-11-22 RX ORDER — LIDOCAINE HYDROCHLORIDE 10 MG/ML
INJECTION INFILTRATION; PERINEURAL CODE/TRAUMA/SEDATION MEDICATION
Status: COMPLETED | OUTPATIENT
Start: 2017-11-22 | End: 2017-11-22

## 2017-11-22 RX ORDER — FENTANYL CITRATE 50 UG/ML
INJECTION, SOLUTION INTRAMUSCULAR; INTRAVENOUS CODE/TRAUMA/SEDATION MEDICATION
Status: COMPLETED | OUTPATIENT
Start: 2017-11-22 | End: 2017-11-22

## 2017-11-22 RX ORDER — MIDAZOLAM HYDROCHLORIDE 5 MG/ML
INJECTION INTRAMUSCULAR; INTRAVENOUS CODE/TRAUMA/SEDATION MEDICATION
Status: COMPLETED | OUTPATIENT
Start: 2017-11-22 | End: 2017-11-22

## 2017-11-22 RX ADMIN — FENTANYL CITRATE 100 MCG: 50 INJECTION, SOLUTION INTRAMUSCULAR; INTRAVENOUS at 01:11

## 2017-11-22 RX ADMIN — TOPICAL ANESTHETIC 0.5 ML: 200 SPRAY DENTAL; PERIODONTAL at 01:11

## 2017-11-22 RX ADMIN — LIDOCAINE HYDROCHLORIDE 8 ML: 10 INJECTION, SOLUTION INFILTRATION; PERINEURAL at 01:11

## 2017-11-22 RX ADMIN — MIDAZOLAM 4 MG: 5 INJECTION INTRAMUSCULAR; INTRAVENOUS at 01:11

## 2017-11-22 RX ADMIN — LIDOCAINE HYDROCHLORIDE 4 ML: 20 INJECTION, SOLUTION INFILTRATION; PERINEURAL at 01:11

## 2017-11-22 NOTE — SEDATION DOCUMENTATION
RML BAL obtained. 90cc NS inserted with a return of 35cc. Left Main Brushed obtained. Patient tolerated well.

## 2017-11-22 NOTE — DISCHARGE SUMMARY
[facility]  Lung Transplant  Discharge Summary      Patient Name: Martin Hendrix Jr.  MRN: [id number]  Admission Date: 11/22/2017  Hospital Length of Stay: 0 days  Discharge Date and Time: 11/22/2017 1:34 PM  Attending Physician: Darrick Wolfe MD   Discharging Provider: Darrick Wolfe MD  Primary Care Provider: Sukhi Dunham Jr, MD     HPI: Mr. Hendrix was admitted for surveillance bronchoscopy after therapy for A2 rejection.    Procedure(s) (LRB):  flexible bronchoscopy with tissue biopsy CPT 28170 (N/A)     Hospital Course: Bronchoscopy was performed without complications.        Significant Diagnostic Studies: Bronchoscopy: see separate report    Pending Diagnostic Studies:     Procedure Component Value Units Date/Time    X-Ray Chest AP Single View [[id number]]     Order Status:  Sent Lab Status:  No result         Final Active Diagnoses:    Diagnosis Date Noted POA    PRINCIPAL PROBLEM:  Lung replaced by transplant [Z94.2] 08/24/2017 Not Applicable      Problems Resolved During this Admission:    Diagnosis Date Noted Date Resolved POA      Discharged Condition: good    Disposition: Home or Self Care    Medications:  Reconciled Home Medications:   Current Discharge Medication List      CONTINUE these medications which have NOT CHANGED    Details   amlodipine (NORVASC) 5 MG tablet Take 1 tablet (5 mg total) by mouth once daily.  Qty: 30 tablet, Refills: 11    Associated Diagnoses: Essential hypertension      aspirin (ECOTRIN) 81 MG EC tablet Take 1 tablet (81 mg total) by mouth once daily.  Qty: 30 tablet, Refills: 11      blood sugar diagnostic Strp To check BG 4 times daily, to use with insurance preferred meter (one touch verio if possible)  Qty: 350 strip, Refills: 3    Associated Diagnoses: Type 2 diabetes mellitus with hyperglycemia, with long-term current use of insulin      calcium-vitamin D tablet 600 mg-200 units Take 1 tablet by mouth 2 (two) times daily.  Qty: 60 tablet,  "Refills: 11      ciprofloxacin HCl (CIPRO) 750 MG tablet Take 750 mg by mouth every 12 (twelve) hours.      famotidine (PEPCID) 20 MG tablet Take 1 tablet (20 mg total) by mouth 2 (two) times daily.  Qty: 60 tablet, Refills: 11      fluticasone (FLONASE) 50 mcg/actuation nasal spray 1 spray by Each Nare route once daily.  Qty: 1 Bottle, Refills: 3    Associated Diagnoses: Sinusitis, unspecified chronicity, unspecified location      folic acid (FOLVITE) 1 MG tablet Take 1 tablet (1 mg total) by mouth once daily.  Qty: 30 tablet, Refills: 11      guaifenesin (MUCINEX) 600 mg 12 hr tablet Take 1 tablet (600 mg total) by mouth 2 (two) times daily.  Qty: 60 tablet, Refills: 11      insulin degludec (TRESIBA FLEXTOUCH U-200) 200 unit/mL (3 mL) InPn Inject 18 Units into the skin every evening.  Qty: 3 Syringe, Refills: 3    Associated Diagnoses: Type 2 diabetes mellitus with hyperglycemia, with long-term current use of insulin      lancets Misc To check BG 4 times daily, to use with insurance preferred meter (one touch verio if possible)  Qty: 350 each, Refills: 3    Associated Diagnoses: Type 2 diabetes mellitus with hyperglycemia, with long-term current use of insulin      magnesium oxide (MAG-OX) 400 mg tablet Take 1 tablet (400 mg total) by mouth 2 (two) times daily.  Qty: 60 tablet, Refills: 11      metFORMIN (GLUCOPHAGE-XR) 500 MG 24 hr tablet Take 2 tablets (1,000 mg total) by mouth 2 (two) times daily with meals.  Qty: 120 tablet, Refills: 11      multivitamin (THERAGRAN) per tablet Take 1 tablet by mouth once daily.      mycophenolate (CELLCEPT) 250 mg Cap Take 4 capsules (1,000 mg total) by mouth 2 (two) times daily. Dose increase.  Qty: 240 capsule, Refills: 11      pen needle, diabetic (BD ULTRA-FINE JIM PEN NEEDLES) 32 gauge x 5/32" Ndle Uses 4 times daily, on multiple daily insulin injections  Qty: 350 each, Refills: 3    Associated Diagnoses: Type 2 diabetes mellitus with hyperglycemia, with long-term " current use of insulin      pravastatin (PRAVACHOL) 20 MG tablet Take 1 tablet (20 mg total) by mouth once daily.  Qty: 90 tablet, Refills: 3      predniSONE (DELTASONE) 10 MG tablet Take by mouth. Taper:  30 mg x 21 d, 20 mg x 60 d, 15 mg x 30 d, 10 mg x 60 d, then 5 mg daily.  Qty: 100 tablet, Refills: 11      sulfamethoxazole-trimethoprim 800-160mg (BACTRIM DS) 800-160 mg Tab Take 1 tablet by mouth every Mon, Wed, Fri.  Qty: 15 tablet, Refills: 11      tacrolimus (PROGRAF) 0.5 MG Cap Take 1 capsule (0.5 mg total) by mouth once daily. Take in the morning.  Qty: 30 capsule, Refills: 11    Associated Diagnoses: Lung replaced by transplant      valganciclovir (VALCYTE) 450 mg Tab Take 1 tablet (450 mg total) by mouth 2 (two) times daily.  Qty: 60 tablet, Refills: 11      voriconazole (VFEND) 200 MG Tab Take 300 mg by mouth 2 (two) times daily. Take 1.5 tablets (total 300 mg) twice a day      furosemide (LASIX) 40 MG tablet Take 1 tablet (40 mg total) by mouth once daily.  Qty: 30 tablet, Refills: 11    Associated Diagnoses: Edema of both legs      insulin lispro (HUMALOG KWIKPEN) 100 unit/mL InPn pen 12 units with breakfast, 10 units with lunch and dinner plus correction scale, max TDD 50 units daily  Qty: 1 Box, Refills: 3    Associated Diagnoses: Type 2 diabetes mellitus with hyperglycemia, with long-term current use of insulin      oxycodone-acetaminophen (PERCOCET) 7.5-325 mg per tablet Take 1 tablet by mouth every 4 (four) hours as needed.  Qty: 40 tablet, Refills: 0         STOP taking these medications       ACCU-CHEK DEANNE PLUS METER [facility] Comments:   Reason for Stopping:             Patient Instructions:     Diet general     Activity as tolerated     Call MD for:  temperature >101     Call MD for:  coughing up blood greater than 3 tablespoons in volume     Call MD for:  chest pain     Call MD for:  difficulty breathing or shortness of breath     Call MD for:  development of yellow/green sputum       Follow  Up:  Follow-up Information     Darrick Wolfe MD In 1 month.    Specialty:  Transplant  Contact information:  [address] [place] [id number]  [phone]                   Darrick Wolfe MD  Lung Transplant  [facility]

## 2017-11-22 NOTE — DISCHARGE INSTRUCTIONS
Flexible Bronchoscopy  A flexible bronchoscopy is an exam of the airways of your lungs. A thin, flexible tube called a bronchoscope is used. It has a light and small camera that allow the healthcare provider to view your airways.    Before your test  · Follow your healthcare provider's instructions carefully. If you dont, the exam may be canceled. Or you may need to take it again.  · If you are taking blood-thinning medicine, ask your healthcare provider if you should stop taking the medicine before this test.  · Have no food or drink for at least 8 hours before the test. Also, avoid smoking for 24 hours before the test.  · You will need to remove any dentures or removable devices from your mouth.  · Right before the test, you will be given sedating medicines to help you relax. The medicine may be given by an IV (intravenously) into one of your veins. In addition, your nose and throat may be numbed with a special spray to help prevent gagging and coughing.  · If you are having this test as an outpatient, make sure you have an adult friend or family member to drive you home.  During your test  Bronchoscopy takes 45 to 60 minutes and includes the following steps:  · You may be given medicine (anesthesia) so that you are unconscious or asleep during the procedure.  · The healthcare provider inserts the tube into your nose or mouth.  · If you have not been given anesthesia, you might feel a gagging sensation. To help ease this feeling, you will be told to swallow or take deep breaths. Your airway will remain open even with the tube in place. But you wont be able to talk.  · The provider checks your breathing passage. He or she may also remove tiny tissue samples for biopsy.  After your test  · You may have a mild sore throat or cough. Your voice may also be hoarse.  · Don't eat or drink until the anesthesia wears off.  · If you had a biopsy, you might see traces of blood being coughed up.  When to call your  healthcare provider  Call your healthcare provider right away if you have any of the following:  · Shortness of breath  · Chest pain  · Bleeding from your nose or throat  · Coughing up a large amount of blood  · A fever above 100.4°F (38°C) for more than 24 hours  Call 911  Call 911 if you have:  · Chest pain  · Severe shortness of breath   Date Last Reviewed: 12/1/2016  © 9879-4705 [employer]. [address] [place] [address], [place] [address]. All rights reserved. This information is not intended as a substitute for professional medical care. Always follow your healthcare professional's instructions.

## 2017-11-22 NOTE — SEDATION DOCUMENTATION
Moderate concious sedation was performed and cardiorespiratory functions were monitored the entire procedure by Any Kraus RN.  Sedation began at 1307pm  and concluded at 1331pm.

## 2017-11-22 NOTE — PROGRESS NOTES
"Notified "Tanika" in pulmonary, pt ready from [facility] standpoint. Blood glucose 204, pt asymptomatic. Dr. Wolfe to be notified. No further instructions given at this time. Pt notified, was aware, procedure not until 1300.  "

## 2017-11-22 NOTE — PLAN OF CARE
Discharge instructions reviewed w/ pt and family, verbalized understanding. Pt in NADN.No complaints at this time. Tolerated liquids w/ no issues. CXray done at bedside and read and ok to go home w/. To be d/c'd w/ family.

## 2017-11-22 NOTE — INTERVAL H&P NOTE
The patient has been examined and the H&P has been reviewed:    I concur with the findings and no changes have occurred since H&P was written.    Anesthesia/Surgery risks, benefits and alternative options discussed and understood by patient/family.          Active Hospital Problems    Diagnosis  POA    Lung replaced by transplant [Z94.2]  Not Applicable      Resolved Hospital Problems    Diagnosis Date Resolved POA   No resolved problems to display.

## 2017-11-22 NOTE — PATIENT INSTRUCTIONS
Continue your Tresiba 18 units nightly    Change Humalog to 12 units before breakfast, continue 10 units with lunch and dinner    Continue Metformin 1000 mg twice daily     Drop off logs to 6th floor clinic in December when you come to see Dr. Wolfe       Snacks can be an important part of a balanced, healthy meal plan. They allow you to eat more frequently, feeling full and satisfied throughout the day. Also, they allow you to spread carbohydrates evenly, which may stabilize blood sugars.  Plus, snacks are enjoyable!     The amount of carbohydrate needed at snacks varies. Generally, about 15-30 grams of carbohydrate per snack is recommended.  Below you will find some tasty treats.       0-5 gm carb   Crystal Light   Vitamin Water Zero   Herbal tea, unsweetened   2 tsp peanut butter on celery   1./2 cup sugar-free jell-o   1 sugar-free popsicle   ¼ cup blueberries   8oz Blue Alejandrina unsweetened almond milk   5 baby carrots & celery sticks, cucumbers, bell peppers dipped in ¼ cup salsa, 2Tbsp light ranch dressing or 2Tbsp plain Greek yogurt   10 Goldfish crackers   ½ oz low-fat cheese or string cheese   1 closed handful of nuts, unsalted   1 Tbsp of sunflower seeds, unsalted   1 cup Smart Pop popcorn   1 whole grain brown rice cake        15 gm carb   1 small piece of fruit or ½ banana or 1/2 cup lite canned fruit   3 briseida cracker squares   3 cups Smart Pop popcorn, top spray butter, Gilman lite salt or cinnamon and Truvia   5 Vanilla Wafers   ½ cup low fat, no added sugar ice cream or frozen yogurt (Blue bell, Blue Bunny, Weight Watchers, Skinny Cow)   ½ turkey, ham, or chicken sandwich   ½ c fruit with ½ c Cottage cheese   4-6 unsalted wheat crackers with 1 oz low fat cheese or 1 tbsp peanut butter    30-45 goldfish crackers (depending on flavor)    7-8 [employer] mini brown rice cakes (caramel, apple cinnamon, chocolate)    12 [employer] mini brown rice cakes (cheddar, bbq, ranch)    1/3  cup hummus dip with raw veg   1/2 whole wheat aniket, 1Tbsp hummus   Mini Pizza (1/2 whole wheat English muffin, low-fat  cheese, tomato sauce)   100 calorie snack pack (Oreo, Chips Ahoy, Ritz Mix, Baked Cheetos)   4-6 oz. light or Greek Style yogurt (Chobani, Yoplait, [place], [place]field)   ½ cup sugar-free pudding     6 in. wheat tortilla or aniket oven toasted chips (topped with spray butter flavoring, cinnamon, Truvia OR spray butter, garlic powder, chili powder)    18 BBQ Popchips (available at [employer], [employer], Fresh Market)

## 2017-11-22 NOTE — PROGRESS NOTES
CC:  Diabetes.     HPI: Martin Hendrix Jr. is a 56 y.o. male presents for visit. The patient has had diabetes along with associated comorbidities indicated in the Visit Diagnosis section of this encounter. The patient was diagnosed with Type 2 diabetes in ~2017 on labs.    A1c 6.7% 4/2017 before lung transplant.  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 During initial hospitalizations, BG well controlled without insulin. Repeat A1c >10%, Recently admitted 11/3/17 and discharged 11/8/17 for treatment of rejection with high dose steroids and discharged by endocrine team on insulin    Last visit, BG readings markedly elevated, started on MDI.     Today, patient presents with mother with complete BG logs. BG MUCH improved, prandial excursions remain after breakfast meal.     Living back home in [place], plan for bronch today     DIABETES COMPLICATIONS: nephropathy (micro x1)     STANDARDS of CARE:  Statin:  Yes pravastatin 20 mg daily   ACEI or ARB:  No - will need lisinopril if MAC >30 on repeat in 3 months. Caution with starting now based on high normal K  ASA:  Yes - 81 mg daily   Last eye exam no - to schedule   Microalbumin/Creatinine ratio:  Lab Results   Component Value Date    MICALBCREAT 57.1 (H) 11/16/2017         CURRENT A1C:    Hemoglobin A1C   Date Value Ref Range Status   11/05/2017 10.0 (H) 4.0 - 5.6 % Final     Comment:     According to ADA guidelines, hemoglobin A1c <7.0% represents  optimal control in non-pregnant diabetic patients. Different  metrics may apply to specific patient populations.   Standards of Medical Care in Diabetes-2016.  For the purpose of screening for the presence of diabetes:  <5.7%     Consistent with the absence of diabetes  5.7-6.4%  Consistent with increasing risk for diabetes   (prediabetes)  >or=6.5%  Consistent with diabetes  Currently, no consensus exists for use of hemoglobin A1c  for diagnosis of diabetes for children.  This Hemoglobin A1c assay has significant  interference with fetal   hemoglobin   (HbF). The results are invalid for patients with abnormal amounts of   HbF,   including those with known Hereditary Persistence   of Fetal Hemoglobin. Heterozygous hemoglobin variants (HbAS, HbAC,   HbAD, HbAE, HbA2) do not significantly interfere with this assay;   however, presence of multiple variants in a sample may impact the %   interference.     04/24/2017 6.7 (H) 4.5 - 6.2 % Final     Comment:     According to ADA guidelines, hemoglobin A1C <7.0% represents  optimal control in non-pregnant diabetic patients.  Different  metrics may apply to specific populations.   Standards of Medical Care in Diabetes - 2016.  For the purpose of screening for the presence of diabetes:  <5.7%     Consistent with the absence of diabetes  5.7-6.4%  Consistent with increasing risk for diabetes   (prediabetes)  >or=6.5%  Consistent with diabetes  Currently no consensus exists for use of hemoglobin A1C  for diagnosis of diabetes for children.         GOAL A1C: <7%    DM MEDICATIONS USED IN THE PAST: Metformin, Glipizide, MDI while in hospital     CURRENT DIABETES MEDICATIONS: Metformin 1000 mg BID, Humalog 10 units AC plus correction scale (150 +1), Tresiba 18 units nightly    BLOOD GLUCOSE MONITORING:    Checking BG ac/hs            HYPOGLYCEMIA:  No    MEALS:   Eating 2-3 meals per day     Snacks on fruit, chips    Water + lemon, rare juice    MEDICATIONS, ALLERGIES, LABS, VS's, MEDICAL, SURGICAL, SOCIAL, AND FAMILY HISTORY reviewed and updated in the appropriate sections during this encounter    ROS:     Constitutional: Negative for weight change  Endocrine:  + improved polyuria, polydipsia, nocturia.  HENT: Denies neck swelling, lumps, horseness or trouble swallowing. Denies any personal or family history of thyroid cancer.    Eyes: Negative for visual disturbance.   Respiratory: Negative for cough or shortness or breath.  Gastrointestinal: Negative for nausea, diarrhea, vomiting,  "bloating.  + pancreatitis, hospitalized (~2009), denies gastroparesis.  Genitourinary: Negative for urgency, frequency, frequent urinary tract infections.  Neurological: Negative for syncope, no numbness/burning of extremities.      All other systems reviewed and are negative.    PE:  Constitutional: /80 (BP Location: Left arm, Patient Position: Sitting)   Pulse 77   Ht 5' 11" (1.803 m)   Wt 87.4 kg (192 lb 11.2 oz)   BMI 26.88 kg/m²    Well developed, well nourished, NAD.    Neck:  No trachial deviation present, No neck masses noticed   Thyroid:  No thyromegaly present.  No thyroid tenderness.      Cardiovascular:    Auscultation:  No murmurs or abnormal sounds   Lower extremities:  No edema or cyanosis.     Respiratory:    Effort:  Normal, no use of accessory muscles.   Auscultation: breath sounds normal, no adventitious sounds.  Skin:    Inspection: no rashes, lesion or ulcers, + acanthosis nigracans.   Palpation: no induration or nodules.    Psychiatric:  Judgement and insight good with normal mood and affect.      FOOT EXAMINATION:   Protective Sensation (w/ 10 gram monofilament):  Right: Intact  Left: Intact    Visual Inspection:  Callus -  Bilateral    Pedal Pulses:   Right: Present  Left: Present    Posterior tibialis:   Right:Present  Left: Present    Normal vibratory response to 128 Hz tuning fork bilaterally.       Lab Results   Component Value Date    TSH 1.092 04/24/2017         ______________________________________________________________________     ASSESSMENT and PLAN:    1- Type 2 diabetes with micro x1 and hyperglycemia - A1c to be repeated in 3 months (2/2017), Continue Metformin 1000 mg BID, Tresiba 18 units nightly, increase Humalog with breakfast to 12 units, continue 10 units before lunch and dinner, plus correction scale 150 +1     Cannot use DPP-4 or GLP-1 medications due to pancreatitis, caution with SGLT-2 at this time, due to h/o AVILA and high BG       Instructed to monitor BG " ac/hs, document on BG logs, and bring complete BG logs to all visits.  - instructed to drop off logs to me in 6th floor clinic when at main campus for upcoming visit with Dr. Wolfe in December Return in about 2 months (around 1/22/2018). with below labs before    Orders Placed This Encounter   Procedures    Hemoglobin A1c    Microalbumin/creatinine urine ratio     2 -  Microalbuminuria x1 - repeat in 3 months, needs improved BG control    2- s/p lung transplant on immunosuppression and steroids - steroid taper as follows:  20 mg until 11/19  15 mg 11/20 =- 12/19  10 mg 12/20 - 2/18  5 mg daily 2/19 and stay on this dose    3- Hyperlipidemia -  LFT's WNL. Cotninue Pravastatin 20 mg daily. Use of pravastatin due to potential interaction with valcyte wih lipitor    Lab Results   Component Value Date    LDLCALC 146.2 04/24/2017

## 2017-11-24 LAB
BACTERIA SPEC AEROBE CULT: NO GROWTH
BACTERIA SPEC AEROBE CULT: NORMAL
GALACTOMANNAN AG SPEC-ACNC: <0.5 INDEX
GRAM STN SPEC: NORMAL

## 2017-11-27 LAB
ENTEROVIRUS: NOT DETECTED
HUMAN BOCAVIRUS: NOT DETECTED
HUMAN CORONAVIRUS, COMMON COLD VIRUS: NOT DETECTED
INFLUENZA A - H1N1-09: NOT DETECTED
LEGIONELLA PNEUMOPHILA: NOT DETECTED
MORAXELLA CATARRHALIS: NOT DETECTED
PARAINFLUENZA: NOT DETECTED
POCT GLUCOSE: 191 MG/DL (ref 70–110)
RVP - ADENOVIRUS: NOT DETECTED
RVP - HUMAN METAPNEUMOVIRUS (HMPV): NOT DETECTED
RVP - INFLUENZA A: NOT DETECTED
RVP - INFLUENZA B: NOT DETECTED
RVP - RESPIRATORY SYNCTIAL VIRUS (RSV) A: NOT DETECTED
RVP - RESPIRATORY VIRAL PANEL, SOURCE: NORMAL
RVP - RHINOVIRUS: NOT DETECTED
TEM - ACINETOBACTER BAUMANNII: NOT DETECTED
TEM - BORDETELLA PERTUSSIS: NOT DETECTED
TEM - CHLAMYDOPHILA PNEUMONIAE: NOT DETECTED
TEM - KLEBSIELLA PNEUMONIAE: NOT DETECTED
TEM - MRSA: NOT DETECTED
TEM - MYCOPLASMA PNEUMONIAE: NOT DETECTED
TEM - NEISSERIA MENINGITIDIS: NOT DETECTED
TEM - PANTON-VALENTINE: NOT DETECTED
TEM - PSEUDOMONAS AERUGINOSA: NOT DETECTED
TEM - STAPHYLOCOCCUS AUREUS: NOT DETECTED
TEM - STREPTOCOCCUS PNEUMONIAE: NOT DETECTED
TEM - STREPTOCOCCUS PYOGENES A: NOT DETECTED
TEM- HAEMOPHILUS INFLUENZAE B: NOT DETECTED
TEM- HAEMOPHILUS INFLUENZAE: NOT DETECTED

## 2017-11-29 ENCOUNTER — PATIENT MESSAGE (OUTPATIENT)
Dept: TRANSPLANT | Facility: CLINIC | Age: 57
End: 2017-11-29

## 2017-11-29 ENCOUNTER — TELEPHONE (OUTPATIENT)
Dept: TRANSPLANT | Facility: CLINIC | Age: 57
End: 2017-11-29

## 2017-11-29 DIAGNOSIS — T86.819 COMPLICATION OF TRANSPLANTED LUNG, UNSPECIFIED COMPLICATION: Primary | ICD-10-CM

## 2017-11-29 DIAGNOSIS — Z94.2 LUNG REPLACED BY TRANSPLANT: Primary | ICD-10-CM

## 2017-11-29 RX ORDER — MYCOPHENOLATE MOFETIL 250 MG/1
1500 CAPSULE ORAL 2 TIMES DAILY
Qty: 360 CAPSULE | Refills: 11 | Status: ON HOLD | OUTPATIENT
Start: 2017-11-29 | End: 2018-03-26 | Stop reason: HOSPADM

## 2017-11-29 NOTE — TELEPHONE ENCOUNTER
Received VVORB from Dr. Wolfe instructing patient to increase MMF to 1500 mg bid from 1000 mg bid.  Patient has been notified via [facility] message informing him of the above medication changes.  Patient has also been notified of his biopsy results informing him that he still has rejection but it has been downgraded to A1 from A2.  Patient will have a repeat bronchoscopy on 12/20/17.  Asked patient to please respond to the message to confirm receipt and understanding of the above.  Will continue to follow up on confirmation.

## 2017-11-29 NOTE — TELEPHONE ENCOUNTER
Received verified telephone order read back from Dr. Wolfe to schedule a 4 week follow up bronchoscopy for A1 rejection.  Orders entered and procedure scheduled.

## 2017-11-30 LAB — FUNGUS SPEC CULT: NORMAL

## 2017-12-06 ENCOUNTER — LAB VISIT (OUTPATIENT)
Dept: LAB | Facility: HOSPITAL | Age: 57
End: 2017-12-06
Attending: INTERNAL MEDICINE
Payer: COMMERCIAL

## 2017-12-06 DIAGNOSIS — Z94.2 LUNG REPLACED BY TRANSPLANT: ICD-10-CM

## 2017-12-06 LAB
ACID FAST MOD KINY STN SPEC: NORMAL
ACID FAST MOD KINY STN SPEC: NORMAL
BASOPHILS # BLD AUTO: 0.04 K/UL
BASOPHILS NFR BLD: 0.5 %
DIFFERENTIAL METHOD: ABNORMAL
EOSINOPHIL # BLD AUTO: 0.1 K/UL
EOSINOPHIL NFR BLD: 0.9 %
ERYTHROCYTE [DISTWIDTH] IN BLOOD BY AUTOMATED COUNT: 14.3 %
FUNGUS SPEC CULT: NORMAL
HCT VFR BLD AUTO: 34 %
HGB BLD-MCNC: 11 G/DL
IMM GRANULOCYTES # BLD AUTO: 0.19 K/UL
IMM GRANULOCYTES NFR BLD AUTO: 2.6 %
LYMPHOCYTES # BLD AUTO: 1.3 K/UL
LYMPHOCYTES NFR BLD: 17.2 %
MCH RBC QN AUTO: 30.8 PG
MCHC RBC AUTO-ENTMCNC: 32.4 G/DL
MCV RBC AUTO: 95 FL
MONOCYTES # BLD AUTO: 0.4 K/UL
MONOCYTES NFR BLD: 5.8 %
MYCOBACTERIUM SPEC QL CULT: NORMAL
MYCOBACTERIUM SPEC QL CULT: NORMAL
NEUTROPHILS # BLD AUTO: 5.4 K/UL
NEUTROPHILS NFR BLD: 73 %
NRBC BLD-RTO: 0 /100 WBC
PLATELET # BLD AUTO: 366 K/UL
PMV BLD AUTO: 8.6 FL
RBC # BLD AUTO: 3.57 M/UL
WBC # BLD AUTO: 7.45 K/UL

## 2017-12-06 PROCEDURE — 36415 COLL VENOUS BLD VENIPUNCTURE: CPT | Mod: PO

## 2017-12-06 PROCEDURE — 85025 COMPLETE CBC W/AUTO DIFF WBC: CPT

## 2017-12-11 DIAGNOSIS — I10 ESSENTIAL HYPERTENSION: ICD-10-CM

## 2017-12-11 DIAGNOSIS — Z94.2 LUNG REPLACED BY TRANSPLANT: Primary | ICD-10-CM

## 2017-12-11 RX ORDER — LANOLIN ALCOHOL/MO/W.PET/CERES
400 CREAM (GRAM) TOPICAL 2 TIMES DAILY
Qty: 180 TABLET | Refills: 3 | Status: SHIPPED | OUTPATIENT
Start: 2017-12-11 | End: 2018-03-19 | Stop reason: SDUPTHER

## 2017-12-11 RX ORDER — FOLIC ACID 1 MG/1
1 TABLET ORAL DAILY
Qty: 90 TABLET | Refills: 3 | Status: SHIPPED | OUTPATIENT
Start: 2017-12-11 | End: 2018-11-13 | Stop reason: SDUPTHER

## 2017-12-11 RX ORDER — SULFAMETHOXAZOLE AND TRIMETHOPRIM 800; 160 MG/1; MG/1
1 TABLET ORAL
Qty: 45 TABLET | Refills: 3 | Status: SHIPPED | OUTPATIENT
Start: 2017-12-11 | End: 2018-09-03 | Stop reason: SDUPTHER

## 2017-12-11 RX ORDER — AMLODIPINE BESYLATE 5 MG/1
5 TABLET ORAL DAILY
Qty: 90 TABLET | Refills: 3 | Status: SHIPPED | OUTPATIENT
Start: 2017-12-11 | End: 2018-07-02 | Stop reason: SDUPTHER

## 2017-12-11 RX ORDER — VALGANCICLOVIR 450 MG/1
450 TABLET, FILM COATED ORAL 2 TIMES DAILY
Qty: 180 TABLET | Refills: 3 | Status: SHIPPED | OUTPATIENT
Start: 2017-12-11 | End: 2018-07-11 | Stop reason: ALTCHOICE

## 2017-12-11 RX ORDER — FAMOTIDINE 20 MG/1
20 TABLET, FILM COATED ORAL 2 TIMES DAILY
Qty: 180 TABLET | Refills: 3 | Status: SHIPPED | OUTPATIENT
Start: 2017-12-11 | End: 2018-11-19 | Stop reason: SDUPTHER

## 2017-12-11 RX ORDER — CALCIUM CARBONATE/VITAMIN D3 600MG-5MCG
1 TABLET ORAL 2 TIMES DAILY
Qty: 180 TABLET | Refills: 3 | Status: SHIPPED | OUTPATIENT
Start: 2017-12-11 | End: 2019-02-01 | Stop reason: SDUPTHER

## 2017-12-11 RX ORDER — ASPIRIN 81 MG/1
81 TABLET ORAL DAILY
Qty: 90 TABLET | Refills: 3 | Status: SHIPPED | OUTPATIENT
Start: 2017-12-11 | End: 2018-11-13 | Stop reason: SDUPTHER

## 2017-12-11 RX ORDER — PREDNISONE 10 MG/1
TABLET ORAL
Qty: 90 TABLET | Refills: 3 | Status: SHIPPED | OUTPATIENT
Start: 2017-12-11 | End: 2018-02-19 | Stop reason: DRUGHIGH

## 2017-12-11 NOTE — TELEPHONE ENCOUNTER
Received fax request for all attached meds for a 90 day supply from [facility].  All requested medications entered and sent to Dr. Wolfe for review and approval.

## 2017-12-13 ENCOUNTER — TELEPHONE (OUTPATIENT)
Dept: ENDOCRINOLOGY | Facility: HOSPITAL | Age: 57
End: 2017-12-13

## 2017-12-13 ENCOUNTER — OFFICE VISIT (OUTPATIENT)
Dept: OPTOMETRY | Facility: CLINIC | Age: 57
End: 2017-12-13
Payer: COMMERCIAL

## 2017-12-13 DIAGNOSIS — Z79.4 TYPE 2 DIABETES MELLITUS WITH HYPERGLYCEMIA, WITH LONG-TERM CURRENT USE OF INSULIN: Chronic | ICD-10-CM

## 2017-12-13 DIAGNOSIS — H35.9 MACULAR DISORDER: ICD-10-CM

## 2017-12-13 DIAGNOSIS — H52.4 MYOPIA WITH PRESBYOPIA OF BOTH EYES: ICD-10-CM

## 2017-12-13 DIAGNOSIS — H52.13 MYOPIA WITH PRESBYOPIA OF BOTH EYES: ICD-10-CM

## 2017-12-13 DIAGNOSIS — E11.65 TYPE 2 DIABETES MELLITUS WITH HYPERGLYCEMIA, WITH LONG-TERM CURRENT USE OF INSULIN: Chronic | ICD-10-CM

## 2017-12-13 DIAGNOSIS — H25.13 NUCLEAR SCLEROSIS OF BOTH EYES: ICD-10-CM

## 2017-12-13 DIAGNOSIS — Z01.00 DIABETIC EYE EXAM: Primary | ICD-10-CM

## 2017-12-13 DIAGNOSIS — E11.9 DIABETIC EYE EXAM: Primary | ICD-10-CM

## 2017-12-13 DIAGNOSIS — E11.9 TYPE 2 DIABETES MELLITUS WITHOUT RETINOPATHY: ICD-10-CM

## 2017-12-13 PROCEDURE — 92004 COMPRE OPH EXAM NEW PT 1/>: CPT | Mod: S$GLB,,, | Performed by: OPTOMETRIST

## 2017-12-13 PROCEDURE — 99999 PR PBB SHADOW E&M-EST. PATIENT-LVL II: CPT | Mod: PBBFAC,,, | Performed by: OPTOMETRIST

## 2017-12-13 PROCEDURE — 92015 DETERMINE REFRACTIVE STATE: CPT | Mod: S$GLB,,, | Performed by: OPTOMETRIST

## 2017-12-13 RX ORDER — INSULIN LISPRO 100 [IU]/ML
INJECTION, SOLUTION INTRAVENOUS; SUBCUTANEOUS
Qty: 1 BOX | Refills: 3 | Status: SHIPPED | OUTPATIENT
Start: 2017-12-13 | End: 2017-12-26 | Stop reason: SDUPTHER

## 2017-12-13 NOTE — PROGRESS NOTES
HPI     Diabetic Eye Exam    Additional comments: Diabetic eye exam - newly diagnosed.  Taking insulin and oral meds.  No apparent problems.  VA okay at distance with Rx.  Removes glasses for reading.  Unaided near VA okay.  Lenses are single vision distance lenses.            Comments   Patient's age: 57 y.o. WM   Occupation: Disability  Approximate date of last eye examination: 03/2017  Name of last eye doctor seen: [facility]   City/State: [facility], [place]   Wears glasses? Yes      If yes, wears  Full-time or part-time?  Full time   Present glasses are: Bifocal, SV Distance, SV Reading?  SV Distance   Approximate age of present glasses:  Less than 1 year old    Got new glasses following last exam, or subsequently?:  Yes    Any problem with VA with glasses?  No  Wears CLs?:  No   Headaches?  No  Eye pain/discomfort?  No                                                                                     Flashes?  No  Floaters?  No  Diplopia/Double vision?  No  Patient's Ocular History:         Any eye surgeries? No         Any eye injury?  No         Any treatment for eye disease?  No  Family history of eye disease?  No  Significant patient medical history:         1. Diabetes?  Yes, recently diagnosed 1 month ago LBS this morning   115       If yes, IDDM or NIDDM?  IDDM and also takes oral meds.    2. HBP?  Yes, managed with medication               3. 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 OTC eyedrops currently using:  No   ! Prescription eye meds currently using:  No   ! Any history of allergy/adverse reaction to any eye meds used   previously?  No    ! Any history of allergy/adverse reaction to eyedrops used during prior   eye exam(s)? No    ! Any history of allergy/adverse reaction to Novacaine or similar meds?   No   ! Any history of allergy/adverse reaction to Epinephrine or similar meds?   No    ! Patient okay with use of anesthetic eyedrops to check eye pressure?    yes        !  "Patient okay with use of eyedrops to dilate pupils today?  yes   !  Allergies/Medications/Medical History/Family History reviewed today?    Yes       PD =   68/64  Desired reading distance =  17.5"                                                                  Last edited by Jagdish Keene, OD on 12/13/2017  8:59 AM. (History)            Assessment /Plan     For exam results, see Encounter Report.    1. Diabetic eye exam     2. Type 2 diabetes mellitus without retinopathy     3. Nuclear sclerosis of both eyes     4. Myopia with presbyopia of both eyes     5. Macular disorder                  Trace nuclear sclerosis of lens of both eyes.  Minimal retinal pigment epithelium changes at macula of left eye (only).  Monitor only.  Otherwise, good ocular health in both eyes.  No evidence of diabetic retinal changes in either eye.  Myopia in each eye, with very satisfactory correctable VA in each eye.  Presbyopia consistent with age.  New spectacle lens Rx issued for use as desired.  Recheck in one year - or prior if notes any problems or apparent changes in vision in the interim.         "

## 2017-12-13 NOTE — LETTER
December 17, 2017      Ce Bertrand, NP  [address]  [place] [facility] [place] [facility] [address]           [facility]  [address]  [facility] [address]  Phone: [phone]  Fax: [phone]          Patient: Martin Hendrix Jr.   MR Number: [id number]   YOB: 1960   Date of Visit: 12/13/2017       Dear Ce Bertrand:    Thank you for referring Martin Hendrix to me for evaluation. Attached you will find relevant portions of my assessment and plan of care.    If you have questions, please do not hesitate to call me. I look forward to following Martin Hendrix along with you.    Sincerely,    Jagdish Keene, OD    Enclosure  CC:  No Recipients    If you would like to receive this communication electronically, please contact [email or web address] or [phone] to request more information on [employer] access.    For providers and/or their staff who would like to refer a patient to [facility], please contact us through our one-stop-shop provider referral line, [facility], at [phone].    If you feel you have received this communication in error or would no longer like to receive these types of communications, please e-mail [email or web address]

## 2017-12-15 ENCOUNTER — PATIENT MESSAGE (OUTPATIENT)
Dept: DIABETES | Facility: CLINIC | Age: 57
End: 2017-12-15

## 2017-12-18 ENCOUNTER — HOSPITAL ENCOUNTER (OUTPATIENT)
Dept: PULMONOLOGY | Facility: CLINIC | Age: 57
Discharge: HOME | End: 2017-12-18
Payer: COMMERCIAL

## 2017-12-18 ENCOUNTER — HOSPITAL ENCOUNTER (OUTPATIENT)
Dept: RADIOLOGY | Facility: HOSPITAL | Age: 57
Discharge: HOME | End: 2017-12-18
Attending: INTERNAL MEDICINE
Payer: COMMERCIAL

## 2017-12-18 ENCOUNTER — OFFICE VISIT (OUTPATIENT)
Dept: TRANSPLANT | Facility: CLINIC | Age: 57
End: 2017-12-18
Payer: COMMERCIAL

## 2017-12-18 ENCOUNTER — LAB VISIT (OUTPATIENT)
Dept: LAB | Facility: HOSPITAL | Age: 57
End: 2017-12-18
Attending: INTERNAL MEDICINE
Payer: COMMERCIAL

## 2017-12-18 VITALS
DIASTOLIC BLOOD PRESSURE: 77 MMHG | TEMPERATURE: 98 F | HEART RATE: 85 BPM | SYSTOLIC BLOOD PRESSURE: 133 MMHG | BODY MASS INDEX: 28.63 KG/M2 | HEIGHT: 70 IN | WEIGHT: 200 LBS | OXYGEN SATURATION: 99 % | RESPIRATION RATE: 20 BRPM

## 2017-12-18 DIAGNOSIS — Z94.2 LUNG REPLACED BY TRANSPLANT: ICD-10-CM

## 2017-12-18 DIAGNOSIS — Z48.298 ENCOUNTER FOLLOWING ORGAN TRANSPLANT: Primary | ICD-10-CM

## 2017-12-18 DIAGNOSIS — E83.42 HYPOMAGNESEMIA: ICD-10-CM

## 2017-12-18 DIAGNOSIS — D84.9 IMMUNOSUPPRESSION: ICD-10-CM

## 2017-12-18 DIAGNOSIS — Z79.2 PROPHYLACTIC ANTIBIOTIC: ICD-10-CM

## 2017-12-18 LAB
ABSOLUTE CD3: 638 CELLS/UL (ref 700–2100)
ALBUMIN SERPL BCP-MCNC: 3.9 G/DL
ALP SERPL-CCNC: 58 U/L
ALT SERPL W/O P-5'-P-CCNC: 23 U/L
ANION GAP SERPL CALC-SCNC: 8 MMOL/L
AST SERPL-CCNC: 24 U/L
BASOPHILS # BLD AUTO: 0.04 K/UL
BASOPHILS NFR BLD: 0.4 %
BILIRUB SERPL-MCNC: 0.3 MG/DL
BUN SERPL-MCNC: 21 MG/DL
CALCIUM SERPL-MCNC: 9.4 MG/DL
CD3%: 55.4 % (ref 55–83)
CHLORIDE SERPL-SCNC: 104 MMOL/L
CLASS I ANTIBODIES - LUMINEX: NEGATIVE
CLASS I ANTIBODY COMMENTS - LUMINEX: NORMAL
CLASS II ANTIBODY COMMENTS - LUMINEX: NORMAL
CO2 SERPL-SCNC: 29 MMOL/L
CREAT SERPL-MCNC: 0.8 MG/DL
DIFFERENTIAL METHOD: ABNORMAL
DSA1 TESTING DATE: NORMAL
DSA12 TESTING DATE: NORMAL
DSA2 TESTING DATE: NORMAL
EOSINOPHIL # BLD AUTO: 0.1 K/UL
EOSINOPHIL NFR BLD: 0.5 %
ERYTHROCYTE [DISTWIDTH] IN BLOOD BY AUTOMATED COUNT: 14.2 %
EST. GFR  (AFRICAN AMERICAN): >60 ML/MIN/1.73 M^2
EST. GFR  (NON AFRICAN AMERICAN): >60 ML/MIN/1.73 M^2
GLUCOSE SERPL-MCNC: 104 MG/DL
HCT VFR BLD AUTO: 34.2 %
HGB BLD-MCNC: 11.2 G/DL
IMM GRANULOCYTES # BLD AUTO: 0.14 K/UL
IMM GRANULOCYTES NFR BLD AUTO: 1.5 %
LYMPHOCYTES # BLD AUTO: 1.2 K/UL
LYMPHOCYTES NFR BLD: 12.4 %
MAGNESIUM SERPL-MCNC: 1.5 MG/DL
MCH RBC QN AUTO: 31.6 PG
MCHC RBC AUTO-ENTMCNC: 32.7 G/DL
MCV RBC AUTO: 97 FL
MONOCYTES # BLD AUTO: 0.6 K/UL
MONOCYTES NFR BLD: 6.8 %
NEUTROPHILS # BLD AUTO: 7.3 K/UL
NEUTROPHILS NFR BLD: 78.4 %
NRBC BLD-RTO: 0 /100 WBC
PLATELET # BLD AUTO: 363 K/UL
PMV BLD AUTO: 8.7 FL
POTASSIUM SERPL-SCNC: 3.7 MMOL/L
PRE FEV1 FVC: 82
PRE FEV1: 2.69
PRE FVC: 3.3
PREDICTED FEV1 FVC: 80
PREDICTED FEV1: 3.71
PREDICTED FVC: 4.57
PROT SERPL-MCNC: 6.4 G/DL
RBC # BLD AUTO: 3.54 M/UL
SERUM COLLECTION DT - LUMINEX CLASS I: NORMAL
SERUM COLLECTION DT - LUMINEX CLASS II: NORMAL
SODIUM SERPL-SCNC: 141 MMOL/L
TACROLIMUS BLD-MCNC: 8.6 NG/ML
WBC # BLD AUTO: 9.28 K/UL

## 2017-12-18 PROCEDURE — 86352 CELL FUNCTION ASSAY W/STIM: CPT

## 2017-12-18 PROCEDURE — 85025 COMPLETE CBC W/AUTO DIFF WBC: CPT

## 2017-12-18 PROCEDURE — 99214 OFFICE O/P EST MOD 30 MIN: CPT | Mod: 25,S$GLB,, | Performed by: INTERNAL MEDICINE

## 2017-12-18 PROCEDURE — 36415 COLL VENOUS BLD VENIPUNCTURE: CPT

## 2017-12-18 PROCEDURE — 86833 HLA CLASS II HIGH DEFIN QUAL: CPT | Mod: 91,PO,TXP

## 2017-12-18 PROCEDURE — 94010 BREATHING CAPACITY TEST: CPT | Mod: S$GLB,,, | Performed by: INTERNAL MEDICINE

## 2017-12-18 PROCEDURE — 86359 T CELLS TOTAL COUNT: CPT

## 2017-12-18 PROCEDURE — 99999 PR PBB SHADOW E&M-EST. PATIENT-LVL III: CPT | Mod: PBBFAC,,, | Performed by: INTERNAL MEDICINE

## 2017-12-18 PROCEDURE — 80197 ASSAY OF TACROLIMUS: CPT

## 2017-12-18 PROCEDURE — 71020 XR CHEST PA AND LATERAL: CPT | Mod: 26,,, | Performed by: RADIOLOGY

## 2017-12-18 PROCEDURE — 71020 XR CHEST PA AND LATERAL: CPT | Mod: TC

## 2017-12-18 PROCEDURE — 80053 COMPREHEN METABOLIC PANEL: CPT

## 2017-12-18 PROCEDURE — 86832 HLA CLASS I HIGH DEFIN QUAL: CPT | Mod: PO,TXP

## 2017-12-18 PROCEDURE — 83735 ASSAY OF MAGNESIUM: CPT

## 2017-12-18 PROCEDURE — 86977 RBC SERUM PRETX INCUBJ/INHIB: CPT | Mod: PO,TXP

## 2017-12-18 NOTE — PROGRESS NOTES
"LUNG TRANSPLANT RECIPIENT FOLLOW-UP    Reason for Visit: Follow-up after lung transplantation.                                                                                                         Date of Transplant: 8/22/17                                                                               Reason for Transplant: Sarcoidosis with pulmonary hypertension                                                                               Type of Transplant: bilateral sequential lung                                                                               CMV Status: D+ / R-                                                                               Major Complications:   1. Left vocal cord dysfunction  2. 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 is a 57 y.o. year old male with a PMHx of sarcoid with PH s/p bilateral transplant in August 2017. He states he feels like a "million bucks". No hospitalizations. No complaints.    Review of Systems   Constitutional: Negative for chills, diaphoresis, fever, malaise/fatigue and weight loss.   HENT: Negative for congestion, ear discharge, ear pain, hearing loss, nosebleeds, sinus pain, sore throat and tinnitus.    Eyes: Negative for blurred vision, double vision, photophobia, pain, discharge and redness.   Respiratory: Negative for cough, hemoptysis, sputum production, shortness of breath, wheezing and stridor.    Cardiovascular: Negative for chest pain, palpitations, orthopnea, claudication, leg swelling and PND.   Gastrointestinal: Negative for abdominal pain, blood in stool, constipation, diarrhea, heartburn, melena, nausea and vomiting.   Genitourinary: Negative for dysuria, flank pain, frequency, hematuria and urgency.   Musculoskeletal: Negative for back pain, falls, joint pain, myalgias and neck pain.   Skin: Negative for itching and rash. "   Neurological: Negative for dizziness, tingling, tremors, sensory change, speech change, focal weakness, seizures, loss of consciousness, weakness and headaches.   Endo/Heme/Allergies: Negative for environmental allergies and polydipsia. Does not bruise/bleed easily.   Psychiatric/Behavioral: Negative for depression, hallucinations, memory loss and substance abuse. The patient is not nervous/anxious and does not have insomnia.        Physical Exam   Constitutional: He is oriented to person, place, and time and well-developed, well-nourished, and in no distress. No distress.   HENT:   Head: Normocephalic and atraumatic.   Right Ear: External ear normal.   Left Ear: External ear normal.   Nose: Nose normal.   Mouth/Throat: Oropharynx is clear and moist. No oropharyngeal exudate.   Eyes: Conjunctivae and EOM are normal. Pupils are equal, round, and reactive to light. Right eye exhibits no discharge. Left eye exhibits no discharge. No scleral icterus.   Neck: Normal range of motion. Neck supple. No JVD present. No tracheal deviation present. No thyromegaly present.   Cardiovascular: Normal rate, regular rhythm, normal heart sounds and intact distal pulses.  Exam reveals no gallop and no friction rub.    No murmur heard.  Pulmonary/Chest: Effort normal and breath sounds normal. No stridor. No respiratory distress. He has no wheezes. He has no rales. He exhibits no tenderness.   Abdominal: Soft. Bowel sounds are normal. He exhibits no distension and no mass. There is no tenderness. There is no rebound and no guarding.   Musculoskeletal: Normal range of motion. He exhibits no edema, tenderness or deformity.   Lymphadenopathy:     He has no cervical adenopathy.   Neurological: He is alert and oriented to person, place, and time. He displays normal reflexes. No cranial nerve deficit. He exhibits normal muscle tone. Gait normal. Coordination normal. GCS score is 15.   Skin: Skin is warm and dry. No rash noted. He is not  diaphoretic. No erythema. No pallor.   Psychiatric: Mood, memory, affect and judgment normal.   Nursing note and vitals reviewed.    Labs:  cbc, cmp, tacrolimus Latest Ref Rng & Units 11/20/2017 12/6/2017 12/18/2017   TACROLIMUS LVL 5.0 - 15.0 ng/mL 13.3 - 8.6   WHITE BLOOD CELL COUNT 3.90 - 12.70 K/uL - 7.45 9.28   RBC 4.60 - 6.20 M/uL - 3.57(L) 3.54(L)   HEMOGLOBIN 14.0 - 18.0 g/dL - 11.0(L) 11.2(L)   HEMATOCRIT 40.0 - 54.0 % - 34.0(L) 34.2(L)   MCV 82 - 98 fL - 95 97   MCH 27.0 - 31.0 pg - 30.8 31.6(H)   MCHC 32.0 - 36.0 g/dL - 32.4 32.7   RDW 11.5 - 14.5 % - 14.3 14.2   PLATELETS 150 - 350 K/uL - 366(H) 363(H)   MPV 9.2 - 12.9 fL - 8.6(L) 8.7(L)   GRAN # 1.8 - 7.7 K/uL - 5.4 7.3   LYMPH # 1.0 - 4.8 K/uL - 1.3 1.2   MONO # 0.3 - 1.0 K/uL - 0.4 0.6   EOSINOPHIL% 0.0 - 8.0 % - 0.9 0.5   BASOPHIL% 0.0 - 1.9 % - 0.5 0.4   DIFFERENTIAL METHOD - - Automated Automated   SODIUM 136 - 145 mmol/L 138 - 141   POTASSIUM 3.5 - 5.1 mmol/L 4.8 - 3.7   CHLORIDE 95 - 110 mmol/L 101 - 104   CO2 23 - 29 mmol/L 29 - 29   GLUCOSE 70 - 110 mg/dL 133(H) - 104   BUN BLD 6 - 20 mg/dL 32(H) - 21(H)   CREATININE 0.5 - 1.4 mg/dL 1.0 - 0.8   CALCIUM 8.7 - 10.5 mg/dL 9.6 - 9.4   PROTEIN TOTAL 6.0 - 8.4 g/dL - - 6.4   ALBUMIN 3.5 - 5.2 g/dL - - 3.9   BILIRUBIN TOTAL 0.1 - 1.0 mg/dL - - 0.3   ALK PHOS 55 - 135 U/L - - 58   AST 10 - 40 U/L - - 24   ALT 10 - 44 U/L - - 23   ANION GAP 8 - 16 mmol/L 8 - 8   EGFR IF AFRICAN AMERICAN >60 mL/min/1.73 m:2 >60.0 - >60.0   EGFR IF NON-AFRICAN AMERICAN >60 mL/min/1.73 m:2 >60.0 - >60.0     Pulmonary Function Tests 12/18/2017 11/16/2017 10/26/2017 10/10/2017 9/26/2017 9/19/2017 9/11/2017   FVC 3.3 3 2.71 2.84 2.54 2.36 2.29   FEV1 2.69 2.53 2.41 2.57 2.26 2.13 1.97   TLC (liters) - - - - - - -   DLCO (ml/mmHg sec) - - - - - - -   FVC% 72 65 59 61 53 51 49   FEV1% 72 67 64 69 58 57 53   FEF 25-75 2.62 2.51 2.73 3.26 2.69 2.85 2.26   FEF 25-75% 69 65 71 85 68 74 59   TLC% - - - - - - -   RV - - - - - - -    RV% - - - - - - -   DLCO% - - - - - - -       Imaging:  Results for orders placed during the hospital encounter of 12/18/17   X-Ray Chest PA And Lateral    Narrative 2 views: There is cardiomegaly. There is left basal atelectasis. Right lung is clear. Postoperative change and DJD. There is no change.      Electronically signed by: GAL PALACIOS MD  Date:     12/18/17  Time:    07:47        Assessment:  1. Encounter following organ transplant    2. Lung replaced by transplant    3. Immunosuppression    4. Prophylactic antibiotic      Plan:   1. FEV1 improved (2.41=>2.53=>2.69).  2. Cellcept, Prograf, prednisone 10mg  3. Voriconazole, valgancyclovir, Bactrim [facility]     Nestor Celestin MD  Fellow, [facility]  Cell [phone]    Attending Note:    I have seen and evaluated the patient with the fellow. Their note reflects the content of our discussion and my plan of care.    RTC in 4 weeks      Darrick Wolfe MD  Pulmonary/Critical Care Medicine

## 2017-12-20 ENCOUNTER — HOSPITAL ENCOUNTER (OUTPATIENT)
Facility: HOSPITAL | Age: 57
Discharge: HOME | End: 2017-12-20
Attending: INTERNAL MEDICINE | Admitting: INTERNAL MEDICINE
Payer: COMMERCIAL

## 2017-12-20 ENCOUNTER — SURGERY (OUTPATIENT)
Age: 57
End: 2017-12-20

## 2017-12-20 VITALS
SYSTOLIC BLOOD PRESSURE: 112 MMHG | WEIGHT: 194 LBS | BODY MASS INDEX: 27.77 KG/M2 | DIASTOLIC BLOOD PRESSURE: 78 MMHG | OXYGEN SATURATION: 98 % | RESPIRATION RATE: 18 BRPM | HEIGHT: 70 IN | TEMPERATURE: 99 F | HEART RATE: 86 BPM

## 2017-12-20 DIAGNOSIS — T86.819 COMPLICATION OF TRANSPLANTED LUNG, UNSPECIFIED COMPLICATION: ICD-10-CM

## 2017-12-20 DIAGNOSIS — Z79.4 TYPE 2 DIABETES MELLITUS WITH HYPERGLYCEMIA, WITH LONG-TERM CURRENT USE OF INSULIN: Chronic | ICD-10-CM

## 2017-12-20 DIAGNOSIS — T86.819 COMPLICATION OF TRANSPLANTED LUNG: Primary | ICD-10-CM

## 2017-12-20 DIAGNOSIS — E11.65 TYPE 2 DIABETES MELLITUS WITH HYPERGLYCEMIA, WITH LONG-TERM CURRENT USE OF INSULIN: Chronic | ICD-10-CM

## 2017-12-20 LAB
APPEARANCE FLD: NORMAL
BODY FLD TYPE: NORMAL
COLOR FLD: COLORLESS
IMMUNKNOW (STIMULATED): 216 NG/ML
LYMPHOCYTES NFR FLD MANUAL: 9 %
MESOTHL CELL NFR FLD MANUAL: 11 %
MONOS+MACROS NFR FLD MANUAL: 77 %
NEUTROPHILS NFR FLD MANUAL: 3 %
POCT GLUCOSE: 145 MG/DL (ref 70–110)
WBC # FLD: 96 /CU MM

## 2017-12-20 PROCEDURE — 88312 SPECIAL STAINS GROUP 1: CPT | Mod: 26,,, | Performed by: PATHOLOGY

## 2017-12-20 PROCEDURE — 87070 CULTURE OTHR SPECIMN AEROBIC: CPT

## 2017-12-20 PROCEDURE — 99153 MOD SED SAME PHYS/QHP EA: CPT | Performed by: INTERNAL MEDICINE

## 2017-12-20 PROCEDURE — 87077 CULTURE AEROBIC IDENTIFY: CPT

## 2017-12-20 PROCEDURE — 87102 FUNGUS ISOLATION CULTURE: CPT | Mod: 59

## 2017-12-20 PROCEDURE — 31624 DX BRONCHOSCOPE/LAVAGE: CPT | Mod: 59,RT,, | Performed by: INTERNAL MEDICINE

## 2017-12-20 PROCEDURE — 87205 SMEAR GRAM STAIN: CPT | Mod: 59

## 2017-12-20 PROCEDURE — 87015 SPECIMEN INFECT AGNT CONCNTJ: CPT | Mod: 59

## 2017-12-20 PROCEDURE — 88305 TISSUE EXAM BY PATHOLOGIST: CPT | Mod: 26,,, | Performed by: PATHOLOGY

## 2017-12-20 PROCEDURE — 88305 TISSUE EXAM BY PATHOLOGIST: CPT | Performed by: PATHOLOGY

## 2017-12-20 PROCEDURE — 31623 DX BRONCHOSCOPE/BRUSH: CPT | Performed by: INTERNAL MEDICINE

## 2017-12-20 PROCEDURE — 87186 SC STD MICRODIL/AGAR DIL: CPT

## 2017-12-20 PROCEDURE — 87116 MYCOBACTERIA CULTURE: CPT | Mod: 59

## 2017-12-20 PROCEDURE — 63600175 PHARM REV CODE 636 W HCPCS: Performed by: INTERNAL MEDICINE

## 2017-12-20 PROCEDURE — 31624 DX BRONCHOSCOPE/LAVAGE: CPT | Performed by: INTERNAL MEDICINE

## 2017-12-20 PROCEDURE — 82962 GLUCOSE BLOOD TEST: CPT

## 2017-12-20 PROCEDURE — 25000003 PHARM REV CODE 250: Performed by: INTERNAL MEDICINE

## 2017-12-20 PROCEDURE — 25000242 PHARM REV CODE 250 ALT 637 W/ HCPCS: Performed by: INTERNAL MEDICINE

## 2017-12-20 PROCEDURE — 87641 MR-STAPH DNA AMP PROBE: CPT

## 2017-12-20 PROCEDURE — 31628 BRONCHOSCOPY/LUNG BX EACH: CPT | Mod: RT,,, | Performed by: INTERNAL MEDICINE

## 2017-12-20 PROCEDURE — 99152 MOD SED SAME PHYS/QHP 5/>YRS: CPT | Performed by: INTERNAL MEDICINE

## 2017-12-20 PROCEDURE — 87305 ASPERGILLUS AG IA: CPT

## 2017-12-20 PROCEDURE — 89051 BODY FLUID CELL COUNT: CPT

## 2017-12-20 PROCEDURE — 88313 SPECIAL STAINS GROUP 2: CPT | Mod: 26,,, | Performed by: PATHOLOGY

## 2017-12-20 PROCEDURE — 27201011 HC FORCEPS DISPOSABLE: Performed by: INTERNAL MEDICINE

## 2017-12-20 PROCEDURE — 31628 BRONCHOSCOPY/LUNG BX EACH: CPT | Performed by: INTERNAL MEDICINE

## 2017-12-20 PROCEDURE — 31623 DX BRONCHOSCOPE/BRUSH: CPT | Mod: 59,LT,, | Performed by: INTERNAL MEDICINE

## 2017-12-20 RX ORDER — LIDOCAINE HYDROCHLORIDE 20 MG/ML
SOLUTION OROPHARYNGEAL CODE/TRAUMA/SEDATION MEDICATION
Status: COMPLETED | OUTPATIENT
Start: 2017-12-20 | End: 2017-12-20

## 2017-12-20 RX ORDER — LIDOCAINE HYDROCHLORIDE 20 MG/ML
INJECTION, SOLUTION INFILTRATION; PERINEURAL CODE/TRAUMA/SEDATION MEDICATION
Status: COMPLETED | OUTPATIENT
Start: 2017-12-20 | End: 2017-12-20

## 2017-12-20 RX ORDER — LIDOCAINE HYDROCHLORIDE 10 MG/ML
INJECTION INFILTRATION; PERINEURAL CODE/TRAUMA/SEDATION MEDICATION
Status: COMPLETED | OUTPATIENT
Start: 2017-12-20 | End: 2017-12-20

## 2017-12-20 RX ORDER — MIDAZOLAM HYDROCHLORIDE 5 MG/ML
INJECTION INTRAMUSCULAR; INTRAVENOUS CODE/TRAUMA/SEDATION MEDICATION
Status: COMPLETED | OUTPATIENT
Start: 2017-12-20 | End: 2017-12-20

## 2017-12-20 RX ORDER — FENTANYL CITRATE 50 UG/ML
INJECTION, SOLUTION INTRAMUSCULAR; INTRAVENOUS CODE/TRAUMA/SEDATION MEDICATION
Status: COMPLETED | OUTPATIENT
Start: 2017-12-20 | End: 2017-12-20

## 2017-12-20 RX ORDER — ALBUTEROL SULFATE 0.83 MG/ML
SOLUTION RESPIRATORY (INHALATION) CODE/TRAUMA/SEDATION MEDICATION
Status: COMPLETED | OUTPATIENT
Start: 2017-12-20 | End: 2017-12-20

## 2017-12-20 RX ADMIN — LIDOCAINE HYDROCHLORIDE 4 ML: 20 INJECTION, SOLUTION INFILTRATION; PERINEURAL at 01:12

## 2017-12-20 RX ADMIN — LIDOCAINE HYDROCHLORIDE 8 ML: 10 INJECTION, SOLUTION INFILTRATION; PERINEURAL at 01:12

## 2017-12-20 RX ADMIN — FENTANYL CITRATE 100 MCG: 50 INJECTION, SOLUTION INTRAMUSCULAR; INTRAVENOUS at 01:12

## 2017-12-20 RX ADMIN — MIDAZOLAM 4 MG: 5 INJECTION INTRAMUSCULAR; INTRAVENOUS at 01:12

## 2017-12-20 RX ADMIN — ALBUTEROL SULFATE 2.5 MG: 2.5 SOLUTION RESPIRATORY (INHALATION) at 01:12

## 2017-12-20 RX ADMIN — LIDOCAINE HYDROCHLORIDE 5 ML: 20 SOLUTION OROPHARYNGEAL at 01:12

## 2017-12-20 RX ADMIN — TOPICAL ANESTHETIC 0.5 ML: 200 SPRAY DENTAL; PERIODONTAL at 01:12

## 2017-12-20 NOTE — INTERVAL H&P NOTE
The patient has been examined and the H&P has been reviewed:    I concur with the findings and no changes have occurred since H&P was written.    Anesthesia/Surgery risks, benefits and alternative options discussed and understood by patient/family.          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

## 2017-12-20 NOTE — SEDATION DOCUMENTATION
H and P updated-yes, patient placed on cardiac monitor   Anesthesia Plan:  conscious sedation   ASA verified-yes  Airway exam performed-yes  Personal or Family history of anesthesia complications-No  Consent signed and witnessed, Ranjana Kimbrough RN

## 2017-12-20 NOTE — H&P (VIEW-ONLY)
"LUNG TRANSPLANT RECIPIENT FOLLOW-UP    Reason for Visit: Follow-up after lung transplantation.                                                                                                         Date of Transplant: 8/22/17                                                                               Reason for Transplant: Sarcoidosis with pulmonary hypertension                                                                               Type of Transplant: bilateral sequential lung                                                                               CMV Status: D+ / R-                                                                               Major Complications:   1. Left vocal cord dysfunction  2. 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 is a 57 y.o. year old male with a PMHx of sarcoid with PH s/p bilateral transplant in August 2017. He states he feels like a "million bucks". No hospitalizations. No complaints.    Review of Systems   Constitutional: Negative for chills, diaphoresis, fever, malaise/fatigue and weight loss.   HENT: Negative for congestion, ear discharge, ear pain, hearing loss, nosebleeds, sinus pain, sore throat and tinnitus.    Eyes: Negative for blurred vision, double vision, photophobia, pain, discharge and redness.   Respiratory: Negative for cough, hemoptysis, sputum production, shortness of breath, wheezing and stridor.    Cardiovascular: Negative for chest pain, palpitations, orthopnea, claudication, leg swelling and PND.   Gastrointestinal: Negative for abdominal pain, blood in stool, constipation, diarrhea, heartburn, melena, nausea and vomiting.   Genitourinary: Negative for dysuria, flank pain, frequency, hematuria and urgency.   Musculoskeletal: Negative for back pain, falls, joint pain, myalgias and neck pain.   Skin: Negative for itching and rash. "   Neurological: Negative for dizziness, tingling, tremors, sensory change, speech change, focal weakness, seizures, loss of consciousness, weakness and headaches.   Endo/Heme/Allergies: Negative for environmental allergies and polydipsia. Does not bruise/bleed easily.   Psychiatric/Behavioral: Negative for depression, hallucinations, memory loss and substance abuse. The patient is not nervous/anxious and does not have insomnia.        Physical Exam   Constitutional: He is oriented to person, place, and time and well-developed, well-nourished, and in no distress. No distress.   HENT:   Head: Normocephalic and atraumatic.   Right Ear: External ear normal.   Left Ear: External ear normal.   Nose: Nose normal.   Mouth/Throat: Oropharynx is clear and moist. No oropharyngeal exudate.   Eyes: Conjunctivae and EOM are normal. Pupils are equal, round, and reactive to light. Right eye exhibits no discharge. Left eye exhibits no discharge. No scleral icterus.   Neck: Normal range of motion. Neck supple. No JVD present. No tracheal deviation present. No thyromegaly present.   Cardiovascular: Normal rate, regular rhythm, normal heart sounds and intact distal pulses.  Exam reveals no gallop and no friction rub.    No murmur heard.  Pulmonary/Chest: Effort normal and breath sounds normal. No stridor. No respiratory distress. He has no wheezes. He has no rales. He exhibits no tenderness.   Abdominal: Soft. Bowel sounds are normal. He exhibits no distension and no mass. There is no tenderness. There is no rebound and no guarding.   Musculoskeletal: Normal range of motion. He exhibits no edema, tenderness or deformity.   Lymphadenopathy:     He has no cervical adenopathy.   Neurological: He is alert and oriented to person, place, and time. He displays normal reflexes. No cranial nerve deficit. He exhibits normal muscle tone. Gait normal. Coordination normal. GCS score is 15.   Skin: Skin is warm and dry. No rash noted. He is not  diaphoretic. No erythema. No pallor.   Psychiatric: Mood, memory, affect and judgment normal.   Nursing note and vitals reviewed.    Labs:  cbc, cmp, tacrolimus Latest Ref Rng & Units 11/20/2017 12/6/2017 12/18/2017   TACROLIMUS LVL 5.0 - 15.0 ng/mL 13.3 - 8.6   WHITE BLOOD CELL COUNT 3.90 - 12.70 K/uL - 7.45 9.28   RBC 4.60 - 6.20 M/uL - 3.57(L) 3.54(L)   HEMOGLOBIN 14.0 - 18.0 g/dL - 11.0(L) 11.2(L)   HEMATOCRIT 40.0 - 54.0 % - 34.0(L) 34.2(L)   MCV 82 - 98 fL - 95 97   MCH 27.0 - 31.0 pg - 30.8 31.6(H)   MCHC 32.0 - 36.0 g/dL - 32.4 32.7   RDW 11.5 - 14.5 % - 14.3 14.2   PLATELETS 150 - 350 K/uL - 366(H) 363(H)   MPV 9.2 - 12.9 fL - 8.6(L) 8.7(L)   GRAN # 1.8 - 7.7 K/uL - 5.4 7.3   LYMPH # 1.0 - 4.8 K/uL - 1.3 1.2   MONO # 0.3 - 1.0 K/uL - 0.4 0.6   EOSINOPHIL% 0.0 - 8.0 % - 0.9 0.5   BASOPHIL% 0.0 - 1.9 % - 0.5 0.4   DIFFERENTIAL METHOD - - Automated Automated   SODIUM 136 - 145 mmol/L 138 - 141   POTASSIUM 3.5 - 5.1 mmol/L 4.8 - 3.7   CHLORIDE 95 - 110 mmol/L 101 - 104   CO2 23 - 29 mmol/L 29 - 29   GLUCOSE 70 - 110 mg/dL 133(H) - 104   BUN BLD 6 - 20 mg/dL 32(H) - 21(H)   CREATININE 0.5 - 1.4 mg/dL 1.0 - 0.8   CALCIUM 8.7 - 10.5 mg/dL 9.6 - 9.4   PROTEIN TOTAL 6.0 - 8.4 g/dL - - 6.4   ALBUMIN 3.5 - 5.2 g/dL - - 3.9   BILIRUBIN TOTAL 0.1 - 1.0 mg/dL - - 0.3   ALK PHOS 55 - 135 U/L - - 58   AST 10 - 40 U/L - - 24   ALT 10 - 44 U/L - - 23   ANION GAP 8 - 16 mmol/L 8 - 8   EGFR IF AFRICAN AMERICAN >60 mL/min/1.73 m:2 >60.0 - >60.0   EGFR IF NON-AFRICAN AMERICAN >60 mL/min/1.73 m:2 >60.0 - >60.0     Pulmonary Function Tests 12/18/2017 11/16/2017 10/26/2017 10/10/2017 9/26/2017 9/19/2017 9/11/2017   FVC 3.3 3 2.71 2.84 2.54 2.36 2.29   FEV1 2.69 2.53 2.41 2.57 2.26 2.13 1.97   TLC (liters) - - - - - - -   DLCO (ml/mmHg sec) - - - - - - -   FVC% 72 65 59 61 53 51 49   FEV1% 72 67 64 69 58 57 53   FEF 25-75 2.62 2.51 2.73 3.26 2.69 2.85 2.26   FEF 25-75% 69 65 71 85 68 74 59   TLC% - - - - - - -   RV - - - - - - -    RV% - - - - - - -   DLCO% - - - - - - -       Imaging:  Results for orders placed during the hospital encounter of 12/18/17   X-Ray Chest PA And Lateral    Narrative 2 views: There is cardiomegaly. There is left basal atelectasis. Right lung is clear. Postoperative change and DJD. There is no change.      Electronically signed by: GAL PALACIOS MD  Date:     12/18/17  Time:    07:47        Assessment:  1. Encounter following organ transplant    2. Lung replaced by transplant    3. Immunosuppression    4. Prophylactic antibiotic      Plan:   1. FEV1 improved (2.41=>2.53=>2.69).  2. Cellcept, Prograf, prednisone 10mg  3. Voriconazole, valgancyclovir, Bactrim [facility]     Nestor Celestin MD  Fellow, [facility]  Cell [phone]    Attending Note:    I have seen and evaluated the patient with the fellow. Their note reflects the content of our discussion and my plan of care.    RTC in 4 weeks      Darrick Wolfe MD  Pulmonary/Critical Care Medicine

## 2017-12-20 NOTE — DISCHARGE INSTRUCTIONS
Flexible Bronchoscopy  A flexible bronchoscopy is an exam of the airways of your lungs. A thin, flexible tube called a bronchoscope is used. It has a light and small camera that allow the healthcare provider to view your airways.    Before your test  · Follow your healthcare provider's instructions carefully. If you dont, the exam may be canceled. Or you may need to take it again.  · If you are taking blood-thinning medicine, ask your healthcare provider if you should stop taking the medicine before this test.  · Have no food or drink for at least 8 hours before the test. Also, avoid smoking for 24 hours before the test.  · You will need to remove any dentures or removable devices from your mouth.  · Right before the test, you will be given sedating medicines to help you relax. The medicine may be given by an IV (intravenously) into one of your veins. In addition, your nose and throat may be numbed with a special spray to help prevent gagging and coughing.  · If you are having this test as an outpatient, make sure you have an adult friend or family member to drive you home.  During your test  Bronchoscopy takes 45 to 60 minutes and includes the following steps:  · You may be given medicine (anesthesia) so that you are unconscious or asleep during the procedure.  · The healthcare provider inserts the tube into your nose or mouth.  · If you have not been given anesthesia, you might feel a gagging sensation. To help ease this feeling, you will be told to swallow or take deep breaths. Your airway will remain open even with the tube in place. But you wont be able to talk.  · The provider checks your breathing passage. He or she may also remove tiny tissue samples for biopsy.  After your test  · You may have a mild sore throat or cough. Your voice may also be hoarse.  · Don't eat or drink until the anesthesia wears off.  · If you had a biopsy, you might see traces of blood being coughed up.  When to call your  healthcare provider  Call your healthcare provider right away if you have any of the following:  · Shortness of breath  · Chest pain  · Bleeding from your nose or throat  · Coughing up a large amount of blood  · A fever above 100.4°F (38°C) for more than 24 hours  Call 911  Call 911 if you have:  · Chest pain  · Severe shortness of breath   Date Last Reviewed: 12/1/2016  © 4411-4912 [email or web address]. [address] [place] [address], [place] [address]. All rights reserved. This information is not intended as a substitute for professional medical care. Always follow your healthcare professional's instructions.

## 2017-12-20 NOTE — PLAN OF CARE
Orders reviewed. Consents pending. Pre-op nursing care complete. Brother at bedside. [place] in pulmonary lab notified.

## 2017-12-20 NOTE — SEDATION DOCUMENTATION
BAL RML 90 ml nacl in 40 ml return also send for Galactomannan, TBBX RLL, Kiester micro L main. Verbal report given to [facility] to include documentation charted in procedural sedation documentation.  Patient to be NPO 1 hour post procedure and place in PO tolerance at 1430, CXR needed at bedside.  Moderate concious sedation was performed and cardiorespiratory functions were monitored the entire procedure by Ranjana Kimbrough RN.  Sedation began at 1314  and concluded at 1350.  Ranjana Kimbrough RN

## 2017-12-20 NOTE — PLAN OF CARE
Patient tolerated procedure/anesthesia well, vss, no distress noted or reported, tolerates PO without difficulties, denies pain, voids without difficulties, discharge instructions reviewed with patient and family, verbalized understanding, consents with chart.

## 2017-12-20 NOTE — PROGRESS NOTES
Patient arrived to recovery in no apparent distress, no complaints, room air sats 89%, denies SOB, instructed to take deep breaths, O2 NC 2L placed, breath sounds clear, sats up to 93%, will keep O2 2L a while longer and try room air sats again.

## 2017-12-20 NOTE — DISCHARGE SUMMARY
[facility]  Lung Transplant  Discharge Summary      Patient Name: Martin Hendrix Jr.  MRN: [id number]  Admission Date: 12/20/2017  Hospital Length of Stay: 0 days  Discharge Date and Time: 12/20/2017 1:37 PM  Attending Physician: Darrick Wolfe MD   Discharging Provider: Darrick Wolfe MD  Primary Care Provider: Sukhi Dunham Jr, MD     HPI: Mr. Hendrix was admitted for surveillance bronchoscopy.    Procedure(s) (LRB):  flexible bronchoscopy with tissue biopsy CPT 33439 (N/A)     Hospital Course: Surveillance bronchoscopy was performed without complications.        Significant Diagnostic Studies: Bronchoscopy: see separate report    Pending Diagnostic Studies:     Procedure Component Value Units Date/Time    X-Ray Chest AP Single View [[id number]]     Order Status:  Sent Lab Status:  No result         Final Active Diagnoses:    Diagnosis Date Noted POA    PRINCIPAL PROBLEM:  Complication of transplanted lung [T86.819] 11/02/2017 Yes      Problems Resolved During this Admission:    Diagnosis Date Noted Date Resolved POA      Discharged Condition: good    Disposition: Home or Self Care    Medications:  Reconciled Home Medications:   Current Discharge Medication List      CONTINUE these medications which have NOT CHANGED    Details   amLODIPine (NORVASC) 5 MG tablet Take 1 tablet (5 mg total) by mouth once daily.  Qty: 90 tablet, Refills: 3    Associated Diagnoses: Essential hypertension; Lung replaced by transplant      aspirin (ECOTRIN) 81 MG EC tablet Take 1 tablet (81 mg total) by mouth once daily.  Qty: 90 tablet, Refills: 3    Associated Diagnoses: Lung replaced by transplant      blood sugar diagnostic Strp To check BG 4 times daily, to use with insurance preferred meter (one touch verio if possible)  Qty: 350 strip, Refills: 3    Associated Diagnoses: Type 2 diabetes mellitus with hyperglycemia, with long-term current use of insulin      calcium-vitamin D tablet 600 mg-200 units Take 1  tablet by mouth 2 (two) times daily.  Qty: 180 tablet, Refills: 3    Associated Diagnoses: Lung replaced by transplant      famotidine (PEPCID) 20 MG tablet Take 1 tablet (20 mg total) by mouth 2 (two) times daily.  Qty: 180 tablet, Refills: 3    Associated Diagnoses: Lung replaced by transplant      fluticasone (FLONASE) 50 mcg/actuation nasal spray 1 spray by Each Nare route once daily.  Qty: 1 Bottle, Refills: 3    Associated Diagnoses: Sinusitis, unspecified chronicity, unspecified location      folic acid (FOLVITE) 1 MG tablet Take 1 tablet (1 mg total) by mouth once daily.  Qty: 90 tablet, Refills: 3    Associated Diagnoses: Lung replaced by transplant      furosemide (LASIX) 40 MG tablet Take 1 tablet (40 mg total) by mouth once daily.  Qty: 30 tablet, Refills: 11    Associated Diagnoses: Edema of both legs      guaifenesin (MUCINEX) 600 mg 12 hr tablet Take 1 tablet (600 mg total) by mouth 2 (two) times daily.  Qty: 60 tablet, Refills: 11      insulin degludec (TRESIBA FLEXTOUCH U-200) 200 unit/mL (3 mL) InPn Inject 18 Units into the skin every evening.  Qty: 3 Syringe, Refills: 3    Associated Diagnoses: Type 2 diabetes mellitus with hyperglycemia, with long-term current use of insulin      insulin lispro (HUMALOG KWIKPEN) 100 unit/mL InPn pen 12 units with meals plus correction scale, max TDD 60 units daily  Qty: 1 Box, Refills: 3    Associated Diagnoses: Type 2 diabetes mellitus with hyperglycemia, with long-term current use of insulin      lancets Misc To check BG 4 times daily, to use with insurance preferred meter (one touch verio if possible)  Qty: 350 each, Refills: 3    Associated Diagnoses: Type 2 diabetes mellitus with hyperglycemia, with long-term current use of insulin      magnesium oxide (MAG-OX) 400 mg tablet Take 1 tablet (400 mg total) by mouth 2 (two) times daily.  Qty: 180 tablet, Refills: 3    Associated Diagnoses: Lung replaced by transplant      metFORMIN (GLUCOPHAGE-XR) 500 MG 24 hr  "tablet Take 2 tablets (1,000 mg total) by mouth 2 (two) times daily with meals.  Qty: 120 tablet, Refills: 11      multivitamin (THERAGRAN) per tablet Take 1 tablet by mouth once daily.      mycophenolate (CELLCEPT) 250 mg Cap Take 6 capsules (1,500 mg total) by mouth 2 (two) times daily. Dose increase.  Qty: 360 capsule, Refills: 11    Associated Diagnoses: Lung replaced by transplant      pen needle, diabetic (BD ULTRA-FINE JIM PEN NEEDLES) 32 gauge x 5/32" Ndle Uses 4 times daily, on multiple daily insulin injections  Qty: 350 each, Refills: 3    Associated Diagnoses: Type 2 diabetes mellitus with hyperglycemia, with long-term current use of insulin      pravastatin (PRAVACHOL) 20 MG tablet Take 1 tablet (20 mg total) by mouth once daily.  Qty: 90 tablet, Refills: 3      predniSONE (DELTASONE) 10 MG tablet Take by mouth. Taper:  30 mg x 21 d, 20 mg x 60 d, 15 mg x 30 d, 10 mg x 60 d, then 5 mg daily.  Qty: 90 tablet, Refills: 3    Associated Diagnoses: Lung replaced by transplant      sulfamethoxazole-trimethoprim 800-160mg (BACTRIM DS) 800-160 mg Tab Take 1 tablet by mouth every Mon, Wed, Fri.  Qty: 45 tablet, Refills: 3    Associated Diagnoses: Lung replaced by transplant      tacrolimus (PROGRAF) 0.5 MG Cap Take 1 capsule (0.5 mg total) by mouth once daily. Take in the morning.  Qty: 30 capsule, Refills: 11    Associated Diagnoses: Lung replaced by transplant      valGANciclovir (VALCYTE) 450 mg Tab Take 1 tablet (450 mg total) by mouth 2 (two) times daily.  Qty: 180 tablet, Refills: 3    Associated Diagnoses: Lung replaced by transplant      voriconazole (VFEND) 200 MG Tab Take 300 mg by mouth 2 (two) times daily. Take 1.5 tablets (total 300 mg) twice a day      oxycodone-acetaminophen (PERCOCET) 7.5-325 mg per tablet Take 1 tablet by mouth every 4 (four) hours as needed.  Qty: 40 tablet, Refills: 0         STOP taking these medications       ACCU-CHEK DEANNE PLUS METER Misc Comments:   Reason for Stopping:  "            Patient Instructions:     Diet general     Activity as tolerated     Call MD for:  temperature >101     Call MD for:  coughing up blood greater than 3 tablespoons in volume     Call MD for:  chest pain     Call MD for:  difficulty breathing or shortness of breath     Call MD for:  development of yellow/green sputum       Follow Up:  Follow-up Information     Darrick Wolfe MD.    Specialty:  Transplant  Why:  As needed  Contact information:  [address] [place] [address] [id number] [phone]                   Darrick Wolfe MD  Lung Transplant  [facility]

## 2017-12-21 LAB — GALACTOMANNAN AG SPEC-ACNC: <0.5 INDEX

## 2017-12-22 DIAGNOSIS — J22 ACUTE RESPIRATORY INFECTION: Primary | ICD-10-CM

## 2017-12-22 LAB
BACTERIA SPEC AEROBE CULT: NO GROWTH
BACTERIA SPEC AEROBE CULT: NORMAL
ENTEROVIRUS: NOT DETECTED
GRAM STN SPEC: NORMAL
HUMAN BOCAVIRUS: NOT DETECTED
HUMAN CORONAVIRUS, COMMON COLD VIRUS: NOT DETECTED
INFLUENZA A - H1N1-09: NOT DETECTED
LEGIONELLA PNEUMOPHILA: NOT DETECTED
MORAXELLA CATARRHALIS: NOT DETECTED
PARAINFLUENZA: NOT DETECTED
RVP - ADENOVIRUS: NOT DETECTED
RVP - HUMAN METAPNEUMOVIRUS (HMPV): NOT DETECTED
RVP - INFLUENZA A: NOT DETECTED
RVP - INFLUENZA B: NOT DETECTED
RVP - RESPIRATORY SYNCTIAL VIRUS (RSV) A: NOT DETECTED
RVP - RESPIRATORY VIRAL PANEL, SOURCE: NORMAL
RVP - RHINOVIRUS: NOT DETECTED
TEM - ACINETOBACTER BAUMANNII: NOT DETECTED
TEM - BORDETELLA PERTUSSIS: NOT DETECTED
TEM - CHLAMYDOPHILA PNEUMONIAE: NOT DETECTED
TEM - KLEBSIELLA PNEUMONIAE: NOT DETECTED
TEM - MRSA: NOT DETECTED
TEM - MYCOPLASMA PNEUMONIAE: NOT DETECTED
TEM - NEISSERIA MENINGITIDIS: NOT DETECTED
TEM - PANTON-VALENTINE: NOT DETECTED
TEM - PSEUDOMONAS AERUGINOSA: NOT DETECTED
TEM - STAPHYLOCOCCUS AUREUS: NOT DETECTED
TEM - STREPTOCOCCUS PNEUMONIAE: NOT DETECTED
TEM - STREPTOCOCCUS PYOGENES A: NOT DETECTED
TEM- HAEMOPHILUS INFLUENZAE B: NOT DETECTED
TEM- HAEMOPHILUS INFLUENZAE: NOT DETECTED

## 2017-12-22 RX ORDER — LEVOFLOXACIN 500 MG/1
500 TABLET, FILM COATED ORAL DAILY
Qty: 10 TABLET | Refills: 0 | Status: SHIPPED | OUTPATIENT
Start: 2017-12-22 | End: 2018-01-01

## 2017-12-22 NOTE — TELEPHONE ENCOUNTER
Received written orders from Dr. Wolfe instructing patient to start levofloxacin 500 mg 1 tablet by mouth once daily for 10 days.  Patient was contacted and instructed to start the above medication due to a positive respiratory culture.  Patient verbalized understanding of the above instructions.

## 2017-12-22 NOTE — TELEPHONE ENCOUNTER
----- Message from Darrick Wolfe MD sent at 12/22/2017 11:37 AM CST -----  Levofloxacin 500 mg po daily x 10 days

## 2017-12-26 ENCOUNTER — PATIENT MESSAGE (OUTPATIENT)
Dept: ENDOCRINOLOGY | Facility: CLINIC | Age: 57
End: 2017-12-26

## 2017-12-26 DIAGNOSIS — Z79.4 TYPE 2 DIABETES MELLITUS WITH HYPERGLYCEMIA, WITH LONG-TERM CURRENT USE OF INSULIN: Chronic | ICD-10-CM

## 2017-12-26 DIAGNOSIS — E11.65 TYPE 2 DIABETES MELLITUS WITH HYPERGLYCEMIA, WITH LONG-TERM CURRENT USE OF INSULIN: Chronic | ICD-10-CM

## 2017-12-26 RX ORDER — INSULIN LISPRO 100 [IU]/ML
INJECTION, SOLUTION INTRAVENOUS; SUBCUTANEOUS
Qty: 1 BOX | Refills: 3 | Status: SHIPPED | OUTPATIENT
Start: 2017-12-26 | End: 2018-06-28 | Stop reason: SDUPTHER

## 2017-12-27 ENCOUNTER — PATIENT MESSAGE (OUTPATIENT)
Dept: ENDOCRINOLOGY | Facility: CLINIC | Age: 57
End: 2017-12-27

## 2017-12-27 LAB
FUNGUS SPEC CULT: NORMAL
FUNGUS SPEC CULT: NORMAL

## 2018-01-04 LAB
ACID FAST MOD KINY STN SPEC: NORMAL
ACID FAST MOD KINY STN SPEC: NORMAL
MYCOBACTERIUM SPEC QL CULT: NORMAL
MYCOBACTERIUM SPEC QL CULT: NORMAL

## 2018-01-08 ENCOUNTER — PATIENT MESSAGE (OUTPATIENT)
Dept: TRANSPLANT | Facility: CLINIC | Age: 58
End: 2018-01-08

## 2018-01-08 ENCOUNTER — TELEPHONE (OUTPATIENT)
Dept: TRANSPLANT | Facility: CLINIC | Age: 58
End: 2018-01-08

## 2018-01-08 NOTE — TELEPHONE ENCOUNTER
Returned call to patient.  Patient asked that we sign his insurance papers. Asked patient to fax or drop off the papers and that we will take a look at it.  Once papers are reviewed and if able to complete we will call the patient with an update.  Patient verbalized understanding.

## 2018-01-15 ENCOUNTER — TELEPHONE (OUTPATIENT)
Dept: TRANSPLANT | Facility: CLINIC | Age: 58
End: 2018-01-15

## 2018-01-15 ENCOUNTER — TELEPHONE (OUTPATIENT)
Dept: ENDOCRINOLOGY | Facility: CLINIC | Age: 58
End: 2018-01-15

## 2018-01-15 NOTE — TELEPHONE ENCOUNTER
Received fax form from [email or web address] for BD Needle 4mm 32G/32 for a 90 day supply.  Form placed on Ce Bertrand NP desk for review.

## 2018-01-15 NOTE — TELEPHONE ENCOUNTER
Spoke to patient today who complains of upper abdominal pain that started Friday 1/12/18 evening and nausea and vomiting x 2 on Saturday 1/13/18.  Complains that appetite is down and not sleeping well.  Patient denies any changes in bowels.  Advised patient that if he feels the abdominal pain is severe, he should go to an [facility] ED.  We will see patient in clinic tomorrow morning.  Patient verbalized understanding of the above instructions.  Asked patient to call if symptoms worsen.  Dr. Wolfe was notified via staff message.

## 2018-01-16 ENCOUNTER — HOSPITAL ENCOUNTER (OUTPATIENT)
Dept: RADIOLOGY | Facility: HOSPITAL | Age: 58
Discharge: HOME | End: 2018-01-16
Attending: INTERNAL MEDICINE
Payer: COMMERCIAL

## 2018-01-16 ENCOUNTER — TELEPHONE (OUTPATIENT)
Dept: ENDOCRINOLOGY | Facility: CLINIC | Age: 58
End: 2018-01-16

## 2018-01-16 ENCOUNTER — HOSPITAL ENCOUNTER (OUTPATIENT)
Dept: PULMONOLOGY | Facility: CLINIC | Age: 58
Discharge: HOME | End: 2018-01-16
Payer: COMMERCIAL

## 2018-01-16 ENCOUNTER — OFFICE VISIT (OUTPATIENT)
Dept: TRANSPLANT | Facility: CLINIC | Age: 58
End: 2018-01-16
Payer: COMMERCIAL

## 2018-01-16 ENCOUNTER — LAB VISIT (OUTPATIENT)
Dept: LAB | Facility: HOSPITAL | Age: 58
End: 2018-01-16
Attending: INTERNAL MEDICINE
Payer: COMMERCIAL

## 2018-01-16 VITALS
RESPIRATION RATE: 20 BRPM | HEART RATE: 113 BPM | TEMPERATURE: 97 F | WEIGHT: 192 LBS | BODY MASS INDEX: 27.49 KG/M2 | SYSTOLIC BLOOD PRESSURE: 122 MMHG | DIASTOLIC BLOOD PRESSURE: 77 MMHG | OXYGEN SATURATION: 98 % | HEIGHT: 70 IN

## 2018-01-16 DIAGNOSIS — Z48.298 ENCOUNTER FOLLOWING ORGAN TRANSPLANT: Primary | ICD-10-CM

## 2018-01-16 DIAGNOSIS — Z94.2 LUNG REPLACED BY TRANSPLANT: ICD-10-CM

## 2018-01-16 DIAGNOSIS — R11.0 NAUSEA: ICD-10-CM

## 2018-01-16 DIAGNOSIS — E83.42 HYPOMAGNESEMIA: ICD-10-CM

## 2018-01-16 DIAGNOSIS — D84.9 IMMUNOSUPPRESSION: ICD-10-CM

## 2018-01-16 DIAGNOSIS — Z79.2 PROPHYLACTIC ANTIBIOTIC: ICD-10-CM

## 2018-01-16 LAB
ABSOLUTE CD3: 280 CELLS/UL (ref 700–2100)
ALBUMIN SERPL BCP-MCNC: 3.7 G/DL
ALP SERPL-CCNC: 95 U/L
ALT SERPL W/O P-5'-P-CCNC: 32 U/L
ANION GAP SERPL CALC-SCNC: 12 MMOL/L
AST SERPL-CCNC: 36 U/L
BASOPHILS # BLD AUTO: 0.05 K/UL
BASOPHILS NFR BLD: 0.3 %
BILIRUB SERPL-MCNC: 0.6 MG/DL
BUN SERPL-MCNC: 37 MG/DL
CALCIUM SERPL-MCNC: 9.4 MG/DL
CD3%: 45.4 % (ref 55–83)
CHLORIDE SERPL-SCNC: 99 MMOL/L
CLASS I ANTIBODIES - LUMINEX: NEGATIVE
CLASS I ANTIBODY COMMENTS - LUMINEX: NORMAL
CLASS II ANTIBODIES - LUMINEX: NORMAL
CLASS II ANTIBODY COMMENTS - LUMINEX: NORMAL
CO2 SERPL-SCNC: 27 MMOL/L
CREAT SERPL-MCNC: 1.1 MG/DL
DIFFERENTIAL METHOD: ABNORMAL
DSA1 TESTING DATE: NORMAL
DSA12 TESTING DATE: NORMAL
DSA2 TESTING DATE: NORMAL
EOSINOPHIL # BLD AUTO: 0 K/UL
EOSINOPHIL NFR BLD: 0.3 %
ERYTHROCYTE [DISTWIDTH] IN BLOOD BY AUTOMATED COUNT: 12.9 %
EST. GFR  (AFRICAN AMERICAN): >60 ML/MIN/1.73 M^2
EST. GFR  (NON AFRICAN AMERICAN): >60 ML/MIN/1.73 M^2
GLUCOSE SERPL-MCNC: 145 MG/DL
HCT VFR BLD AUTO: 38.6 %
HGB BLD-MCNC: 12.9 G/DL
IMM GRANULOCYTES # BLD AUTO: 0.36 K/UL
IMM GRANULOCYTES NFR BLD AUTO: 2.3 %
LYMPHOCYTES # BLD AUTO: 0.6 K/UL
LYMPHOCYTES NFR BLD: 3.8 %
MAGNESIUM SERPL-MCNC: 1.6 MG/DL
MCH RBC QN AUTO: 31.7 PG
MCHC RBC AUTO-ENTMCNC: 33.4 G/DL
MCV RBC AUTO: 95 FL
MONOCYTES # BLD AUTO: 0.8 K/UL
MONOCYTES NFR BLD: 5.4 %
NEUTROPHILS # BLD AUTO: 13.6 K/UL
NEUTROPHILS NFR BLD: 87.9 %
NRBC BLD-RTO: 0 /100 WBC
PLATELET # BLD AUTO: 450 K/UL
PMV BLD AUTO: 8.7 FL
POTASSIUM SERPL-SCNC: 4.2 MMOL/L
PRE FEV1 FVC: 81
PRE FEV1: 2.83
PRE FVC: 3.5
PREDICTED FEV1 FVC: 80
PREDICTED FEV1: 3.71
PREDICTED FVC: 4.57
PROT SERPL-MCNC: 6.5 G/DL
RBC # BLD AUTO: 4.07 M/UL
SERUM COLLECTION DT - LUMINEX CLASS I: NORMAL
SERUM COLLECTION DT - LUMINEX CLASS II: NORMAL
SODIUM SERPL-SCNC: 138 MMOL/L
TACROLIMUS BLD-MCNC: 9.2 NG/ML
WBC # BLD AUTO: 15.47 K/UL

## 2018-01-16 PROCEDURE — 80053 COMPREHEN METABOLIC PANEL: CPT | Mod: NTX

## 2018-01-16 PROCEDURE — 71046 X-RAY EXAM CHEST 2 VIEWS: CPT | Mod: TC,FY

## 2018-01-16 PROCEDURE — 85025 COMPLETE CBC W/AUTO DIFF WBC: CPT | Mod: NTX

## 2018-01-16 PROCEDURE — 86832 HLA CLASS I HIGH DEFIN QUAL: CPT | Mod: PO,TXP

## 2018-01-16 PROCEDURE — 86352 CELL FUNCTION ASSAY W/STIM: CPT

## 2018-01-16 PROCEDURE — 86359 T CELLS TOTAL COUNT: CPT

## 2018-01-16 PROCEDURE — 99214 OFFICE O/P EST MOD 30 MIN: CPT | Mod: 25,S$GLB,, | Performed by: INTERNAL MEDICINE

## 2018-01-16 PROCEDURE — 83735 ASSAY OF MAGNESIUM: CPT

## 2018-01-16 PROCEDURE — 36415 COLL VENOUS BLD VENIPUNCTURE: CPT

## 2018-01-16 PROCEDURE — 86833 HLA CLASS II HIGH DEFIN QUAL: CPT | Mod: 91,PO,TXP

## 2018-01-16 PROCEDURE — 94010 BREATHING CAPACITY TEST: CPT | Mod: S$GLB,,, | Performed by: INTERNAL MEDICINE

## 2018-01-16 PROCEDURE — 71046 X-RAY EXAM CHEST 2 VIEWS: CPT | Mod: 26,,, | Performed by: RADIOLOGY

## 2018-01-16 PROCEDURE — 99999 PR PBB SHADOW E&M-EST. PATIENT-LVL III: CPT | Mod: PBBFAC,,, | Performed by: INTERNAL MEDICINE

## 2018-01-16 PROCEDURE — 80197 ASSAY OF TACROLIMUS: CPT | Mod: NTX

## 2018-01-16 PROCEDURE — 86977 RBC SERUM PRETX INCUBJ/INHIB: CPT | Mod: PO,NTX

## 2018-01-16 NOTE — PROGRESS NOTES
LUNG TRANSPLANT RECIPIENT FOLLOW-UP    Reason for Visit: Follow-up after lung transplantation.                                                                                                         Date of Transplant: 8/22/17                                                                               Reason for Transplant: Sarcoidosis with pulmonary hypertension     Type of Transplant: bilateral sequential lung     CMV Status: D+ / R-      Major Complications:   1. Left vocal cord dysfunction  2. 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 is a 57 y.o. year old male with the above listed transplant history who presents today for routine follow-up.  Since his last visit, he has completed a 14 day course of Cipro for Enterobacter.  Denies any dyspnea, cough, productive sputum, or sick contacts.  Developed nausea and vomiting of non-bloody non-bilious emesis on Saturday.  Has gradually been getting better but still has decreased appetite.  Denies any fever/chills/nightsweats, hematemesis, or diarrhea.  Complains of mild abdominal bloating.  Denies any changes in diet.  Otherwise, feels well.  Had questions about vaccinations from his PCP and would like a note to return to work.      Review of Systems   Constitutional: Negative for chills, diaphoresis, fever, malaise/fatigue and weight loss.   HENT: Negative for congestion, ear discharge, ear pain, hearing loss, nosebleeds, sinus pain, sore throat and tinnitus.    Eyes: Negative for blurred vision, double vision, photophobia, pain, discharge and redness.   Respiratory: Negative for cough, hemoptysis, sputum production, shortness of breath, wheezing and stridor.    Cardiovascular: Negative for chest pain, palpitations, orthopnea, claudication, leg swelling and PND.   Gastrointestinal: Negative for abdominal pain, blood in stool, constipation, diarrhea, heartburn,  "melena, nausea and vomiting.   Genitourinary: Negative for dysuria, flank pain, frequency, hematuria and urgency.   Musculoskeletal: Negative for back pain, falls, joint pain, myalgias and neck pain.   Skin: Negative for itching and rash.   Neurological: Negative for dizziness, tingling, tremors, sensory change, speech change, focal weakness, seizures, loss of consciousness, weakness and headaches.   Endo/Heme/Allergies: Negative for environmental allergies and polydipsia. Does not bruise/bleed easily.   Psychiatric/Behavioral: Negative for depression, hallucinations, memory loss and substance abuse. The patient is not nervous/anxious and does not have insomnia.      /77 (BP Location: Right arm, Patient Position: Sitting, BP Method: Large (Automatic))   Pulse (!) 113   Temp 97.4 °F (36.3 °C) (Oral)   Resp 20   Ht 5' 10" (1.778 m)   Wt 87.1 kg (192 lb)   SpO2 98%   BMI 27.55 kg/m²     Physical Exam   Constitutional: He is oriented to person, place, and time and well-developed, well-nourished, and in no distress. No distress.   HENT:   Head: Normocephalic and atraumatic.   Right Ear: External ear normal.   Left Ear: External ear normal.   Nose: Nose normal.   Mouth/Throat: Oropharynx is clear and moist. No oropharyngeal exudate.   Eyes: Conjunctivae and EOM are normal. Pupils are equal, round, and reactive to light. Right eye exhibits no discharge. Left eye exhibits no discharge. No scleral icterus.   Neck: Normal range of motion. Neck supple. No JVD present. No tracheal deviation present. No thyromegaly present.   Cardiovascular: Normal rate, regular rhythm, normal heart sounds and intact distal pulses.  Exam reveals no gallop and no friction rub.    No murmur heard.  Pulmonary/Chest: Effort normal and breath sounds normal. No stridor. No respiratory distress. He has no wheezes. He has no rales. He exhibits no tenderness.   Abdominal: Soft. Bowel sounds are normal. He exhibits no distension and no mass. " There is no tenderness. There is no rebound and no guarding.   Musculoskeletal: Normal range of motion. He exhibits no edema, tenderness or deformity.   Lymphadenopathy:     He has no cervical adenopathy.   Neurological: He is alert and oriented to person, place, and time. He displays normal reflexes. No cranial nerve deficit. He exhibits normal muscle tone. Gait normal. Coordination normal. GCS score is 15.   Skin: Skin is warm and dry. No rash noted. He is not diaphoretic. No erythema. No pallor.   Psychiatric: Mood, memory, affect and judgment normal.   Nursing note and vitals reviewed.    Labs:  cbc, cmp, tacrolimus Latest Ref Rng & Units 12/6/2017 12/18/2017 1/16/2018   TACROLIMUS LVL 5.0 - 15.0 ng/mL - 8.6 9.2   WHITE BLOOD CELL COUNT 3.90 - 12.70 K/uL 7.45 9.28 15.47(H)   RBC 4.60 - 6.20 M/uL 3.57(L) 3.54(L) 4.07(L)   HEMOGLOBIN 14.0 - 18.0 g/dL 11.0(L) 11.2(L) 12.9(L)   HEMATOCRIT 40.0 - 54.0 % 34.0(L) 34.2(L) 38.6(L)   MCV 82 - 98 fL 95 97 95   MCH 27.0 - 31.0 pg 30.8 31.6(H) 31.7(H)   MCHC 32.0 - 36.0 g/dL 32.4 32.7 33.4   RDW 11.5 - 14.5 % 14.3 14.2 12.9   PLATELETS 150 - 350 K/uL 366(H) 363(H) 450(H)   MPV 9.2 - 12.9 fL 8.6(L) 8.7(L) 8.7(L)   GRAN # 1.8 - 7.7 K/uL 5.4 7.3 13.6(H)   LYMPH # 1.0 - 4.8 K/uL 1.3 1.2 0.6(L)   MONO # 0.3 - 1.0 K/uL 0.4 0.6 0.8   EOSINOPHIL% 0.0 - 8.0 % 0.9 0.5 0.3   BASOPHIL% 0.0 - 1.9 % 0.5 0.4 0.3   DIFFERENTIAL METHOD - Automated Automated Automated   SODIUM 136 - 145 mmol/L - 141 138   POTASSIUM 3.5 - 5.1 mmol/L - 3.7 4.2   CHLORIDE 95 - 110 mmol/L - 104 99   CO2 23 - 29 mmol/L - 29 27   GLUCOSE 70 - 110 mg/dL - 104 145(H)   BUN BLD 6 - 20 mg/dL - 21(H) 37(H)   CREATININE 0.5 - 1.4 mg/dL - 0.8 1.1   CALCIUM 8.7 - 10.5 mg/dL - 9.4 9.4   PROTEIN TOTAL 6.0 - 8.4 g/dL - 6.4 6.5   ALBUMIN 3.5 - 5.2 g/dL - 3.9 3.7   BILIRUBIN TOTAL 0.1 - 1.0 mg/dL - 0.3 0.6   ALK PHOS 55 - 135 U/L - 58 95   AST 10 - 40 U/L - 24 36   ALT 10 - 44 U/L - 23 32   ANION GAP 8 - 16 mmol/L - 8 12   EGFR  IF AFRICAN AMERICAN >60 mL/min/1.73 m:2 - >60.0 >60.0   EGFR IF NON-AFRICAN AMERICAN >60 mL/min/1.73 m:2 - >60.0 >60.0     Pulmonary Function Tests 1/16/2018 12/18/2017 11/16/2017 10/26/2017 10/10/2017 9/26/2017 9/19/2017   FVC 3.5 3.3 3 2.71 2.84 2.54 2.36   FEV1 2.83 2.69 2.53 2.41 2.57 2.26 2.13   TLC (liters) - - - - - - -   DLCO (ml/mmHg sec) - - - - - - -   FVC% 76 72 65 59 61 53 51   FEV1% 76 72 67 64 69 58 57   FEF 25-75 2.82 2.62 2.51 2.73 3.26 2.69 2.85   FEF 25-75% 74 69 65 71 85 68 74   TLC% - - - - - - -   RV - - - - - - -   RV% - - - - - - -   DLCO% - - - - - - -       Imaging:  Results for orders placed during the hospital encounter of 01/16/18   X-Ray Chest PA And Lateral    Narrative Patient had bilateral lung transplant.  Postoperative changes are noted.  The cardiac size is not enlarged.  Lungs are clear.  No infiltrate is noted.    Impression  Satisfactory postoperative findings      Electronically signed by: Carlton Parker MD  Date:     01/16/18  Time:    08:10        Assessment:  1. Encounter following organ transplant    2. Lung replaced by transplant    3. Immunosuppression    4. Prophylactic antibiotic    5. Nausea      Plan:   1. FEV1 continues to improve.  CXR with no acute changes.  Symptomatically doing very well.  Will continue to monitor at routine visits.    2. Continue with Tacrolimus, Prednisone, and Mycophenolate.  Labs reviewed.  No changes to Tacrolimus dose today.    3. Continue with Valcyte, Bactrim, and Voriconazole.    4. Nausea improving.  Recommended increased hydration.      5. Follow-up in 1 month or earlier if needed.    Samantha Bill DO  [facility] Fellow    Attending Note:    I have seen and evaluated the patient with the fellow. Their note reflects the content of our discussion and my plan of care.      Darrick Wolfe MD  Pulmonary/Critical Care Medicine

## 2018-01-17 ENCOUNTER — PATIENT MESSAGE (OUTPATIENT)
Dept: TRANSPLANT | Facility: CLINIC | Age: 58
End: 2018-01-17

## 2018-01-18 ENCOUNTER — PATIENT MESSAGE (OUTPATIENT)
Dept: TRANSPLANT | Facility: CLINIC | Age: 58
End: 2018-01-18

## 2018-01-18 DIAGNOSIS — Z94.2 LUNG REPLACED BY TRANSPLANT: Primary | ICD-10-CM

## 2018-01-18 LAB — IMMUNKNOW (STIMULATED): 75 NG/ML

## 2018-01-18 NOTE — PROGRESS NOTES
Received verified telephone read back from Dr. Wolfe to schedule 6 month post transplant surveillance bronchoscopy.  Orders entered and submitted for scheduling.

## 2018-01-19 ENCOUNTER — TELEPHONE (OUTPATIENT)
Dept: INFECTIOUS DISEASES | Facility: CLINIC | Age: 58
End: 2018-01-19

## 2018-01-19 DIAGNOSIS — Z94.2 LUNG TRANSPLANTED: Primary | ICD-10-CM

## 2018-01-19 NOTE — TELEPHONE ENCOUNTER
Call to patient regarding need for update vaccines-  Flu, PPSV23 (history of PCV 13 2013), Hep A and B.  No answer.  Will try again.    1/22/18 -  Spoke to patient.  He advised that he received his flu vaccine yesterday, 1/21/18 and his 3rd Hepatitis B on 1/10, given by his Primary.    He is due for a second Hepatitis A and a Pneumovax.  Have put orders in for him to receive these at next visit on 2/7.

## 2018-01-25 LAB
FUNGUS SPEC CULT: NORMAL
FUNGUS SPEC CULT: NORMAL

## 2018-02-06 NOTE — PROGRESS NOTES
[facility]  Department of Otorhinolaryngology and Communication Sciences    Subjective:      Martin Hendrix Jr. is a 57 y.o. male who presents for follow-up. He has  left vocal fold paralysis following lung transplant 8/2017.    TREATMENT HISTORY:  9/29/2017 - Left VF injection augmentation - HA    Since the injection, he reports durable improvements in his voice. He continues to note improved strength and projection, though still with some mild limitations. He does not feel he has experienced any deterioration. He remains pleased with his progress.    Voice Handicap Index - 10 (VHI-10) score is 0.  Reflux symptom Index (RSI) score is 3.  Eating Assessment Tool - 10 (EAT-10) score is 0.  Dyspnea Index (DI) score is 0.  Cough Severity Index (CSI) score is 2.    The patient's medications, allergies, past medical, surgical, social and family histories were reviewed and updated as appropriate.    A detailed review of systems was obtained with pertinent positives as per the above HPI, and otherwise negative.      Objective:     VS reviewed, per RN/MA notes    Constitutional: comfortable, well dressed  Psychiatric: appropriate affect  Respiratory: comfortably breathing, symmetric chest rise, no stridor  Voice: mild inconsistent breathiness and asthenia; improved resonance at high pitches  Head: normocephalic  Eyes: conjunctivae and sclerae clear  Indirect laryngoscopy is limited due to gag    Procedure  Flexible Laryngeal Videostroboscopy (23638): Laryngeal videostroboscopy is indicated to assess laryngeal vibratory biomechanics and vocal fold oscillation, which cannot be assessed with a plain light examination. This was carried out transnasally with a distal chip videoendoscope. After verbal consent was obtained, the patient was positioned and the nose was topically decongested with 1% phenylephrine and topically anesthetized with 4% lidocaine. The endoscope was passed through the most patent nasal cavity and  positioned to image the nasopharynx, larynx, and hypopharynx in detail. The following features were examined: nasopharyngeal, laryngeal, hypopharyngeal masses; velopharyngeal strength, closure, and symmetry of motion; vocal fold range and symmetry of motion; laryngeal mucosal edema, erythema, inflammation, and hydration; salivary pooling; and gross laryngeal sensation. During phonation, the vocal folds were assessed for glottal closure; mucosal wave; vocal fold lesions; vibratory periodicity, amplitude, and phase symmetry; and vertical height match. The equipment was removed. The patient tolerated the procedure well without complication. All findings were normal except:  - left vocal fold immobile, paramedian; durable improvement in contour/support  - complete glottal closure at low modal frequencies, though with phase asymmetry; intermittent aperiodic vibration at low frequencies; increased phase symmetry at high frequencies  - negligible posterior gap; mild vertical height mismatch  - compensatory supraglottic hyperfunction        Assessment:     Martin Hendrix Jr. is a 57 y.o. male with  left vocal fold paralysis following lung transplant 8/2017. He has made durable progress following injection augmentation.     Plan:     Reassurance was provided. I recommend no further intervention at present. He will follow up with me in 6 months, or sooner if needed.    All questions were answered, and the patient is in agreement with the plan.    [place] M.D.  [facility]  Department of Otorhinolaryngology and Communication Sciences

## 2018-02-07 ENCOUNTER — CLINICAL SUPPORT (OUTPATIENT)
Dept: INFECTIOUS DISEASES | Facility: CLINIC | Age: 58
End: 2018-02-07
Payer: COMMERCIAL

## 2018-02-07 ENCOUNTER — PATIENT OUTREACH (OUTPATIENT)
Dept: DIABETES | Facility: CLINIC | Age: 58
End: 2018-02-07

## 2018-02-07 ENCOUNTER — OFFICE VISIT (OUTPATIENT)
Dept: OTOLARYNGOLOGY | Facility: CLINIC | Age: 58
End: 2018-02-07
Payer: COMMERCIAL

## 2018-02-07 VITALS
WEIGHT: 197.06 LBS | BODY MASS INDEX: 28.28 KG/M2 | SYSTOLIC BLOOD PRESSURE: 123 MMHG | HEART RATE: 91 BPM | DIASTOLIC BLOOD PRESSURE: 77 MMHG | TEMPERATURE: 98 F

## 2018-02-07 DIAGNOSIS — Z94.2 LUNG TRANSPLANTED: ICD-10-CM

## 2018-02-07 DIAGNOSIS — Z76.82 ORGAN TRANSPLANT CANDIDATE: ICD-10-CM

## 2018-02-07 DIAGNOSIS — J38.01 UNILATERAL COMPLETE VOCAL FOLD PARALYSIS: Primary | ICD-10-CM

## 2018-02-07 DIAGNOSIS — R49.0 DYSPHONIA: ICD-10-CM

## 2018-02-07 PROCEDURE — 90632 HEPA VACCINE ADULT IM: CPT | Mod: S$GLB,,, | Performed by: INTERNAL MEDICINE

## 2018-02-07 PROCEDURE — 90472 IMMUNIZATION ADMIN EACH ADD: CPT | Mod: S$GLB,,, | Performed by: INTERNAL MEDICINE

## 2018-02-07 PROCEDURE — 90732 PPSV23 VACC 2 YRS+ SUBQ/IM: CPT | Mod: S$GLB,,, | Performed by: INTERNAL MEDICINE

## 2018-02-07 PROCEDURE — 31579 LARYNGOSCOPY TELESCOPIC: CPT | Mod: S$GLB,,, | Performed by: OTOLARYNGOLOGY

## 2018-02-07 PROCEDURE — 3008F BODY MASS INDEX DOCD: CPT | Mod: S$GLB,,, | Performed by: OTOLARYNGOLOGY

## 2018-02-07 PROCEDURE — 99213 OFFICE O/P EST LOW 20 MIN: CPT | Mod: 25,S$GLB,, | Performed by: OTOLARYNGOLOGY

## 2018-02-07 PROCEDURE — 90471 IMMUNIZATION ADMIN: CPT | Mod: S$GLB,,, | Performed by: INTERNAL MEDICINE

## 2018-02-07 PROCEDURE — 99999 PR PBB SHADOW E&M-EST. PATIENT-LVL III: CPT | Mod: PBBFAC,,, | Performed by: OTOLARYNGOLOGY

## 2018-02-07 PROCEDURE — 99999 PR PBB SHADOW E&M-EST. PATIENT-LVL I: CPT | Mod: PBBFAC,,,

## 2018-02-07 NOTE — Clinical Note
Shayne Rowan,   This is from a log review... Majority of BGs are within goal. Pt does not need to send any additional logs but is due for follow-up endocrine appt with labs (A1C, CMP, and lipid panel) 1 week before and bring log to visit. Please assist with scheduling appts.   Many thanks! Kirss

## 2018-02-14 NOTE — PROGRESS NOTES
Received BG log and reviewed - majority of readings remaining within goal range. No need for medication adjustment at this time. Is due for A1C and follow-up endocrine appt and will be due for lipid panel soon. Will send message for scheduling; orders are already in.

## 2018-02-15 ENCOUNTER — TELEPHONE (OUTPATIENT)
Dept: ENDOCRINOLOGY | Facility: CLINIC | Age: 58
End: 2018-02-15

## 2018-02-15 NOTE — TELEPHONE ENCOUNTER
Spoke to patient to schedule appointment patient said he will call back with his next available day I link patient lab to appointment on 2/19

## 2018-02-19 ENCOUNTER — OFFICE VISIT (OUTPATIENT)
Dept: TRANSPLANT | Facility: CLINIC | Age: 58
End: 2018-02-19
Payer: COMMERCIAL

## 2018-02-19 ENCOUNTER — LAB VISIT (OUTPATIENT)
Dept: LAB | Facility: HOSPITAL | Age: 58
End: 2018-02-19
Attending: INTERNAL MEDICINE
Payer: COMMERCIAL

## 2018-02-19 ENCOUNTER — HOSPITAL ENCOUNTER (OUTPATIENT)
Dept: RADIOLOGY | Facility: HOSPITAL | Age: 58
Discharge: HOME | End: 2018-02-19
Attending: INTERNAL MEDICINE
Payer: COMMERCIAL

## 2018-02-19 ENCOUNTER — HOSPITAL ENCOUNTER (OUTPATIENT)
Dept: PULMONOLOGY | Facility: CLINIC | Age: 58
Discharge: HOME | End: 2018-02-19
Payer: COMMERCIAL

## 2018-02-19 ENCOUNTER — PATIENT MESSAGE (OUTPATIENT)
Dept: TRANSPLANT | Facility: CLINIC | Age: 58
End: 2018-02-19

## 2018-02-19 VITALS
SYSTOLIC BLOOD PRESSURE: 136 MMHG | WEIGHT: 198 LBS | BODY MASS INDEX: 28.35 KG/M2 | TEMPERATURE: 98 F | OXYGEN SATURATION: 99 % | DIASTOLIC BLOOD PRESSURE: 78 MMHG | RESPIRATION RATE: 20 BRPM | HEIGHT: 70 IN | HEART RATE: 82 BPM

## 2018-02-19 DIAGNOSIS — Z79.4 TYPE 2 DIABETES MELLITUS WITH HYPERGLYCEMIA, WITH LONG-TERM CURRENT USE OF INSULIN: Chronic | ICD-10-CM

## 2018-02-19 DIAGNOSIS — D84.9 IMMUNOSUPPRESSION: ICD-10-CM

## 2018-02-19 DIAGNOSIS — E83.42 HYPOMAGNESEMIA: ICD-10-CM

## 2018-02-19 DIAGNOSIS — Z94.2 LUNG REPLACED BY TRANSPLANT: ICD-10-CM

## 2018-02-19 DIAGNOSIS — Z79.2 PROPHYLACTIC ANTIBIOTIC: ICD-10-CM

## 2018-02-19 DIAGNOSIS — E11.65 TYPE 2 DIABETES MELLITUS WITH HYPERGLYCEMIA, WITH LONG-TERM CURRENT USE OF INSULIN: Chronic | ICD-10-CM

## 2018-02-19 DIAGNOSIS — Z48.298 ENCOUNTER FOLLOWING ORGAN TRANSPLANT: Primary | ICD-10-CM

## 2018-02-19 LAB
ABSOLUTE CD3: 430 CELLS/UL (ref 700–2100)
ALBUMIN SERPL BCP-MCNC: 3.8 G/DL
ALBUMIN SERPL BCP-MCNC: 3.8 G/DL
ALP SERPL-CCNC: 72 U/L
ALP SERPL-CCNC: 72 U/L
ALT SERPL W/O P-5'-P-CCNC: 16 U/L
ALT SERPL W/O P-5'-P-CCNC: 16 U/L
ANION GAP SERPL CALC-SCNC: 9 MMOL/L
ANION GAP SERPL CALC-SCNC: 9 MMOL/L
AST SERPL-CCNC: 20 U/L
AST SERPL-CCNC: 20 U/L
BASOPHILS # BLD AUTO: 0.08 K/UL
BASOPHILS NFR BLD: 0.9 %
BILIRUB SERPL-MCNC: 0.2 MG/DL
BILIRUB SERPL-MCNC: 0.2 MG/DL
BUN SERPL-MCNC: 20 MG/DL
BUN SERPL-MCNC: 20 MG/DL
CALCIUM SERPL-MCNC: 9.5 MG/DL
CALCIUM SERPL-MCNC: 9.5 MG/DL
CD3%: 58 % (ref 55–83)
CHLORIDE SERPL-SCNC: 105 MMOL/L
CHLORIDE SERPL-SCNC: 105 MMOL/L
CHOLEST SERPL-MCNC: 203 MG/DL
CHOLEST/HDLC SERPL: 3.9 {RATIO}
CO2 SERPL-SCNC: 27 MMOL/L
CO2 SERPL-SCNC: 27 MMOL/L
CREAT SERPL-MCNC: 0.9 MG/DL
CREAT SERPL-MCNC: 0.9 MG/DL
DIFFERENTIAL METHOD: ABNORMAL
EOSINOPHIL # BLD AUTO: 0.1 K/UL
EOSINOPHIL NFR BLD: 1.3 %
ERYTHROCYTE [DISTWIDTH] IN BLOOD BY AUTOMATED COUNT: 12.1 %
EST. GFR  (AFRICAN AMERICAN): >60 ML/MIN/1.73 M^2
EST. GFR  (AFRICAN AMERICAN): >60 ML/MIN/1.73 M^2
EST. GFR  (NON AFRICAN AMERICAN): >60 ML/MIN/1.73 M^2
EST. GFR  (NON AFRICAN AMERICAN): >60 ML/MIN/1.73 M^2
ESTIMATED AVG GLUCOSE: 111 MG/DL
GLUCOSE SERPL-MCNC: 119 MG/DL
GLUCOSE SERPL-MCNC: 119 MG/DL
HBA1C MFR BLD HPLC: 5.5 %
HCT VFR BLD AUTO: 37.2 %
HDLC SERPL-MCNC: 52 MG/DL
HDLC SERPL: 25.6 %
HGB BLD-MCNC: 12 G/DL
IMM GRANULOCYTES # BLD AUTO: 0.1 K/UL
IMM GRANULOCYTES NFR BLD AUTO: 1.1 %
LDLC SERPL CALC-MCNC: 129.4 MG/DL
LYMPHOCYTES # BLD AUTO: 0.7 K/UL
LYMPHOCYTES NFR BLD: 8 %
MAGNESIUM SERPL-MCNC: 1.4 MG/DL
MCH RBC QN AUTO: 30.6 PG
MCHC RBC AUTO-ENTMCNC: 32.3 G/DL
MCV RBC AUTO: 95 FL
MONOCYTES # BLD AUTO: 0.5 K/UL
MONOCYTES NFR BLD: 5.2 %
NEUTROPHILS # BLD AUTO: 7.6 K/UL
NEUTROPHILS NFR BLD: 83.5 %
NONHDLC SERPL-MCNC: 151 MG/DL
NRBC BLD-RTO: 0 /100 WBC
PLATELET # BLD AUTO: 367 K/UL
PMV BLD AUTO: 8.3 FL
POTASSIUM SERPL-SCNC: 4.2 MMOL/L
POTASSIUM SERPL-SCNC: 4.2 MMOL/L
PRE FEV1 FVC: 82
PRE FEV1: 2.79
PRE FVC: 3.4
PREDICTED FEV1 FVC: 80
PREDICTED FEV1: 3.71
PREDICTED FVC: 4.57
PROT SERPL-MCNC: 6.7 G/DL
PROT SERPL-MCNC: 6.7 G/DL
RBC # BLD AUTO: 3.92 M/UL
SODIUM SERPL-SCNC: 141 MMOL/L
SODIUM SERPL-SCNC: 141 MMOL/L
TACROLIMUS BLD-MCNC: 6.2 NG/ML
TRIGL SERPL-MCNC: 108 MG/DL
WBC # BLD AUTO: 9.15 K/UL

## 2018-02-19 PROCEDURE — 86832 HLA CLASS I HIGH DEFIN QUAL: CPT | Mod: PO,NTX

## 2018-02-19 PROCEDURE — 99999 PR PBB SHADOW E&M-EST. PATIENT-LVL V: CPT | Mod: PBBFAC,,, | Performed by: INTERNAL MEDICINE

## 2018-02-19 PROCEDURE — 99214 OFFICE O/P EST MOD 30 MIN: CPT | Mod: 25,S$GLB,, | Performed by: INTERNAL MEDICINE

## 2018-02-19 PROCEDURE — 80053 COMPREHEN METABOLIC PANEL: CPT

## 2018-02-19 PROCEDURE — 94010 BREATHING CAPACITY TEST: CPT | Mod: S$GLB,,, | Performed by: INTERNAL MEDICINE

## 2018-02-19 PROCEDURE — 3008F BODY MASS INDEX DOCD: CPT | Mod: S$GLB,,, | Performed by: INTERNAL MEDICINE

## 2018-02-19 PROCEDURE — 86359 T CELLS TOTAL COUNT: CPT | Mod: NTX

## 2018-02-19 PROCEDURE — 86833 HLA CLASS II HIGH DEFIN QUAL: CPT | Mod: 91,PO,NTX

## 2018-02-19 PROCEDURE — 83735 ASSAY OF MAGNESIUM: CPT

## 2018-02-19 PROCEDURE — 85025 COMPLETE CBC W/AUTO DIFF WBC: CPT | Mod: NTX

## 2018-02-19 PROCEDURE — 71046 X-RAY EXAM CHEST 2 VIEWS: CPT | Mod: TC,FY

## 2018-02-19 PROCEDURE — 86352 CELL FUNCTION ASSAY W/STIM: CPT

## 2018-02-19 PROCEDURE — 80061 LIPID PANEL: CPT

## 2018-02-19 PROCEDURE — 86977 RBC SERUM PRETX INCUBJ/INHIB: CPT | Mod: PO,TXP

## 2018-02-19 PROCEDURE — 71046 X-RAY EXAM CHEST 2 VIEWS: CPT | Mod: 26,,, | Performed by: RADIOLOGY

## 2018-02-19 PROCEDURE — 80197 ASSAY OF TACROLIMUS: CPT

## 2018-02-19 PROCEDURE — 83036 HEMOGLOBIN GLYCOSYLATED A1C: CPT

## 2018-02-19 PROCEDURE — 36415 COLL VENOUS BLD VENIPUNCTURE: CPT

## 2018-02-19 RX ORDER — TACROLIMUS 0.5 MG/1
1 CAPSULE ORAL EVERY 12 HOURS
Qty: 120 CAPSULE | Refills: 11 | Status: SHIPPED | OUTPATIENT
Start: 2018-02-26 | End: 2018-03-02 | Stop reason: SDUPTHER

## 2018-02-19 RX ORDER — TACROLIMUS 0.5 MG/1
0.5 CAPSULE ORAL EVERY 12 HOURS
Qty: 14 CAPSULE | Refills: 0 | Status: SHIPPED | OUTPATIENT
Start: 2018-02-19 | End: 2018-02-26

## 2018-02-19 RX ORDER — PREDNISONE 10 MG/1
5 TABLET ORAL DAILY
COMMUNITY
End: 2019-01-10 | Stop reason: SDUPTHER

## 2018-02-19 NOTE — TELEPHONE ENCOUNTER
Received written orders from Dr. Bill to have patient discontinue voriconazole today.  Prograf dose adjustment per DIEGO Lyles, PharmD.  Prograf dose verified with DIEGO Lyles and approved by Dr. Bill to have patient increase prograf to 0.5 mg bid from 0.5 mg qd x 7 days then increase to 1 mg bid from 0.5 mg bid.  Recheck labs a few days after starting 1 mg bid dose.  Patient scheduled for repeat labs in [place] on Thursday 3/1/18.  [facility] message sent to patient regarding the above instructions.  Follow up with a phone call, pt verbalized understanding of the above and did not have questions at this time.

## 2018-02-19 NOTE — TELEPHONE ENCOUNTER
----- Message from Samantha Bill DO sent at 2/19/2018  2:34 PM CST -----  Tacrolimus 0.5 mg BID x 7 days, then change to 1 mg BID.  Obtain a level a few days after switching to the 1 mg BID.

## 2018-02-19 NOTE — PROGRESS NOTES
LUNG TRANSPLANT RECIPIENT FOLLOW-UP    Reason for Visit: Follow-up after lung transplantation.                                                                                                         Date of Transplant: 8/22/17                                                                               Reason for Transplant: Sarcoidosis with pulmonary hypertension     Type of Transplant: bilateral sequential lung     CMV Status: D+ / R-      Major Complications:   1. Left vocal cord dysfunction  2. 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 is a 57 y.o. year old male with the above listed transplant history who presents today for routine follow-up.  Since his last visit, he has gone back to work full time.  He denies any dyspnea, cough, productive sputum, or sick contacts.  His appetite is good and he has had no nausea.  Denies any fever/chills/nightsweats, hematemesis, or diarrhea.  Overall, feels well.      Review of Systems   Constitutional: Negative for chills, diaphoresis, fever, malaise/fatigue and weight loss.   HENT: Negative for congestion, ear discharge, ear pain, hearing loss, nosebleeds, sinus pain, sore throat and tinnitus.    Eyes: Negative for blurred vision, double vision, photophobia, pain, discharge and redness.   Respiratory: Negative for cough, hemoptysis, sputum production, shortness of breath, wheezing and stridor.    Cardiovascular: Negative for chest pain, palpitations, orthopnea, claudication, leg swelling and PND.   Gastrointestinal: Negative for abdominal pain, blood in stool, constipation, diarrhea, heartburn, melena, nausea and vomiting.   Genitourinary: Negative for dysuria, flank pain, frequency, hematuria and urgency.   Musculoskeletal: Negative for back pain, falls, joint pain, myalgias and neck pain.   Skin: Negative for itching and rash.   Neurological: Negative for  "dizziness, tingling, tremors, sensory change, speech change, focal weakness, seizures, loss of consciousness, weakness and headaches.   Endo/Heme/Allergies: Negative for environmental allergies and polydipsia. Does not bruise/bleed easily.   Psychiatric/Behavioral: Negative for depression, hallucinations, memory loss and substance abuse. The patient is not nervous/anxious and does not have insomnia.      /78 (BP Location: Left arm, Patient Position: Sitting, BP Method: Medium (Automatic))   Pulse 82   Temp 98 °F (36.7 °C) (Oral)   Resp 20   Ht 5' 10" (1.778 m)   Wt 89.8 kg (198 lb)   SpO2 99%   BMI 28.41 kg/m²     Physical Exam   Constitutional: He is oriented to person, place, and time and well-developed, well-nourished, and in no distress. No distress.   HENT:   Head: Normocephalic and atraumatic.   Right Ear: External ear normal.   Left Ear: External ear normal.   Nose: Nose normal.   Mouth/Throat: Oropharynx is clear and moist. No oropharyngeal exudate.   Eyes: Conjunctivae and EOM are normal. Pupils are equal, round, and reactive to light. Right eye exhibits no discharge. Left eye exhibits no discharge. No scleral icterus.   Neck: Normal range of motion. Neck supple. No JVD present. No tracheal deviation present. No thyromegaly present.   Cardiovascular: Normal rate, regular rhythm, normal heart sounds and intact distal pulses.  Exam reveals no gallop and no friction rub.    No murmur heard.  Pulmonary/Chest: Effort normal and breath sounds normal. No stridor. No respiratory distress. He has no wheezes. He has no rales. He exhibits no tenderness.   Abdominal: Soft. Bowel sounds are normal. He exhibits no distension and no mass. There is no tenderness. There is no rebound and no guarding.   Musculoskeletal: Normal range of motion. He exhibits no edema, tenderness or deformity.   Lymphadenopathy:     He has no cervical adenopathy.   Neurological: He is alert and oriented to person, place, and time. " He displays normal reflexes. No cranial nerve deficit. He exhibits normal muscle tone. Gait normal. Coordination normal. GCS score is 15.   Skin: Skin is warm and dry. No rash noted. He is not diaphoretic. No erythema. No pallor.   Psychiatric: Mood, memory, affect and judgment normal.   Nursing note and vitals reviewed.    Labs:  cbc, cmp, tacrolimus Latest Ref Rng & Units 1/16/2018 2/19/2018 2/19/2018   TACROLIMUS LVL 5.0 - 15.0 ng/mL 9.2 - -   WHITE BLOOD CELL COUNT 3.90 - 12.70 K/uL 15.47(H) 9.15 -   RBC 4.60 - 6.20 M/uL 4.07(L) 3.92(L) -   HEMOGLOBIN 14.0 - 18.0 g/dL 12.9(L) 12.0(L) -   HEMATOCRIT 40.0 - 54.0 % 38.6(L) 37.2(L) -   MCV 82 - 98 fL 95 95 -   MCH 27.0 - 31.0 pg 31.7(H) 30.6 -   MCHC 32.0 - 36.0 g/dL 33.4 32.3 -   RDW 11.5 - 14.5 % 12.9 12.1 -   PLATELETS 150 - 350 K/uL 450(H) 367(H) -   MPV 9.2 - 12.9 fL 8.7(L) 8.3(L) -   GRAN # 1.8 - 7.7 K/uL 13.6(H) 7.6 -   LYMPH # 1.0 - 4.8 K/uL 0.6(L) 0.7(L) -   MONO # 0.3 - 1.0 K/uL 0.8 0.5 -   EOSINOPHIL% 0.0 - 8.0 % 0.3 1.3 -   BASOPHIL% 0.0 - 1.9 % 0.3 0.9 -   DIFFERENTIAL METHOD - Automated Automated -   SODIUM 136 - 145 mmol/L 138 141 141   POTASSIUM 3.5 - 5.1 mmol/L 4.2 4.2 4.2   CHLORIDE 95 - 110 mmol/L 99 105 105   CO2 23 - 29 mmol/L 27 27 27   GLUCOSE 70 - 110 mg/dL 145(H) 119(H) 119(H)   BUN BLD 6 - 20 mg/dL 37(H) 20 20   CREATININE 0.5 - 1.4 mg/dL 1.1 0.9 0.9   CALCIUM 8.7 - 10.5 mg/dL 9.4 9.5 9.5   PROTEIN TOTAL 6.0 - 8.4 g/dL 6.5 6.7 6.7   ALBUMIN 3.5 - 5.2 g/dL 3.7 3.8 3.8   BILIRUBIN TOTAL 0.1 - 1.0 mg/dL 0.6 0.2 0.2   ALK PHOS 55 - 135 U/L 95 72 72   AST 10 - 40 U/L 36 20 20   ALT 10 - 44 U/L 32 16 16   ANION GAP 8 - 16 mmol/L 12 9 9   EGFR IF AFRICAN AMERICAN >60 mL/min/1.73 m:2 >60.0 >60.0 >60.0   EGFR IF NON-AFRICAN AMERICAN >60 mL/min/1.73 m:2 >60.0 >60.0 >60.0     Pulmonary Function Tests 2/19/2018 1/16/2018 12/18/2017 11/16/2017 10/26/2017 10/10/2017 9/26/2017   FVC 3.4 3.5 3.3 3 2.71 2.84 2.54   FEV1 2.79 2.83 2.69 2.53 2.41 2.57 2.26    TLC (liters) - - - - - - -   DLCO (ml/mmHg sec) - - - - - - -   FVC% 74 76 72 65 59 61 53   FEV1% 75 76 72 67 64 69 58   FEF 25-75 2.77 2.82 2.62 2.51 2.73 3.26 2.69   FEF 25-75% 73 74 69 65 71 85 68   TLC% - - - - - - -   RV - - - - - - -   RV% - - - - - - -   DLCO% - - - - - - -       Imaging:  Results for orders placed during the hospital encounter of 01/16/18   X-Ray Chest PA And Lateral    Narrative Patient had bilateral lung transplant.  Postoperative changes are noted.  The cardiac size is not enlarged.  Lungs are clear.  No infiltrate is noted.    Impression  Satisfactory postoperative findings      Electronically signed by: Carlton Parker MD  Date:     01/16/18  Time:    08:10        Assessment:  1. Encounter following organ transplant    2. Lung replaced by transplant    3. Immunosuppression    4. Prophylactic antibiotic      Plan:   1. FEV1 remains unchanged.  Symptomatically doing very well.  Will continue to monitor at routine visits.  Surveillance bronchoscopy this Wednesday.    2. Continue with Tacrolimus, Prednisone, and Mycophenolate.  Labs reviewed.  Will stop voriconazole so will need to adjust tacrolimus dosage. Tacrolimus level pending today.    3. Continue with Valcyte and Bactrim.  Will stop Voriconazole.    4. Follow-up in 1 month or earlier if needed.    Irwin Celestin NP  Lung Transplant  [id number]

## 2018-02-21 ENCOUNTER — DOCUMENTATION ONLY (OUTPATIENT)
Dept: OTOLARYNGOLOGY | Facility: CLINIC | Age: 58
End: 2018-02-21

## 2018-02-21 ENCOUNTER — SURGERY (OUTPATIENT)
Age: 58
End: 2018-02-21

## 2018-02-21 ENCOUNTER — HOSPITAL ENCOUNTER (OUTPATIENT)
Facility: HOSPITAL | Age: 58
Discharge: HOME | End: 2018-02-21
Attending: INTERNAL MEDICINE | Admitting: INTERNAL MEDICINE
Payer: COMMERCIAL

## 2018-02-21 VITALS
OXYGEN SATURATION: 92 % | HEART RATE: 84 BPM | RESPIRATION RATE: 18 BRPM | WEIGHT: 195 LBS | TEMPERATURE: 98 F | DIASTOLIC BLOOD PRESSURE: 75 MMHG | BODY MASS INDEX: 27.3 KG/M2 | SYSTOLIC BLOOD PRESSURE: 116 MMHG | HEIGHT: 71 IN

## 2018-02-21 DIAGNOSIS — Z94.2 LUNG REPLACED BY TRANSPLANT: Primary | ICD-10-CM

## 2018-02-21 LAB
ACID FAST MOD KINY STN SPEC: NORMAL
ACID FAST MOD KINY STN SPEC: NORMAL
APPEARANCE FLD: NORMAL
BODY FLD TYPE: NORMAL
CLASS I ANTIBODIES - LUMINEX: NEGATIVE
CLASS I ANTIBODY COMMENTS - LUMINEX: NORMAL
CLASS II ANTIBODIES - LUMINEX: NORMAL
CLASS II ANTIBODY COMMENTS - LUMINEX: NORMAL
COLOR FLD: COLORLESS
CPRA %: 0
DSA1 TESTING DATE: NORMAL
DSA12 TESTING DATE: NORMAL
DSA2 TESTING DATE: NORMAL
IMMUNKNOW (STIMULATED): 167 NG/ML
LYMPHOCYTES NFR FLD MANUAL: 10 %
MONOS+MACROS NFR FLD MANUAL: 90 %
MYCOBACTERIUM SPEC QL CULT: NORMAL
MYCOBACTERIUM SPEC QL CULT: NORMAL
POCT GLUCOSE: 101 MG/DL (ref 70–110)
POCT GLUCOSE: 110 MG/DL (ref 70–110)
SERUM COLLECTION DT - LUMINEX CLASS I: NORMAL
SERUM COLLECTION DT - LUMINEX CLASS II: NORMAL
WBC # FLD: 135 /CU MM

## 2018-02-21 PROCEDURE — 88305 TISSUE EXAM BY PATHOLOGIST: CPT | Mod: 26,,, | Performed by: PATHOLOGY

## 2018-02-21 PROCEDURE — 82962 GLUCOSE BLOOD TEST: CPT

## 2018-02-21 PROCEDURE — 99153 MOD SED SAME PHYS/QHP EA: CPT | Performed by: INTERNAL MEDICINE

## 2018-02-21 PROCEDURE — 88305 TISSUE EXAM BY PATHOLOGIST: CPT | Performed by: PATHOLOGY

## 2018-02-21 PROCEDURE — 31628 BRONCHOSCOPY/LUNG BX EACH: CPT | Performed by: INTERNAL MEDICINE

## 2018-02-21 PROCEDURE — 99152 MOD SED SAME PHYS/QHP 5/>YRS: CPT | Performed by: INTERNAL MEDICINE

## 2018-02-21 PROCEDURE — 87102 FUNGUS ISOLATION CULTURE: CPT | Mod: 59

## 2018-02-21 PROCEDURE — 31624 DX BRONCHOSCOPE/LAVAGE: CPT | Performed by: INTERNAL MEDICINE

## 2018-02-21 PROCEDURE — 31624 DX BRONCHOSCOPE/LAVAGE: CPT | Mod: 59,RT,, | Performed by: INTERNAL MEDICINE

## 2018-02-21 PROCEDURE — 27201011 HC FORCEPS DISPOSABLE: Performed by: INTERNAL MEDICINE

## 2018-02-21 PROCEDURE — 63600175 PHARM REV CODE 636 W HCPCS: Performed by: INTERNAL MEDICINE

## 2018-02-21 PROCEDURE — 31623 DX BRONCHOSCOPE/BRUSH: CPT | Mod: 59,LT,, | Performed by: INTERNAL MEDICINE

## 2018-02-21 PROCEDURE — 87205 SMEAR GRAM STAIN: CPT | Mod: 59

## 2018-02-21 PROCEDURE — 89051 BODY FLUID CELL COUNT: CPT

## 2018-02-21 PROCEDURE — 87070 CULTURE OTHR SPECIMN AEROBIC: CPT

## 2018-02-21 PROCEDURE — 88312 SPECIAL STAINS GROUP 1: CPT | Mod: 26,,, | Performed by: PATHOLOGY

## 2018-02-21 PROCEDURE — 31623 DX BRONCHOSCOPE/BRUSH: CPT | Performed by: INTERNAL MEDICINE

## 2018-02-21 PROCEDURE — 87496 CYTOMEG DNA AMP PROBE: CPT

## 2018-02-21 PROCEDURE — 31628 BRONCHOSCOPY/LUNG BX EACH: CPT | Mod: RT,,, | Performed by: INTERNAL MEDICINE

## 2018-02-21 PROCEDURE — 25000003 PHARM REV CODE 250: Performed by: INTERNAL MEDICINE

## 2018-02-21 PROCEDURE — 87116 MYCOBACTERIA CULTURE: CPT | Mod: 59

## 2018-02-21 PROCEDURE — 88313 SPECIAL STAINS GROUP 2: CPT | Mod: 26,,, | Performed by: PATHOLOGY

## 2018-02-21 PROCEDURE — 87015 SPECIMEN INFECT AGNT CONCNTJ: CPT

## 2018-02-21 PROCEDURE — 87305 ASPERGILLUS AG IA: CPT

## 2018-02-21 PROCEDURE — 87541 LEGION PNEUMO DNA AMP PROB: CPT

## 2018-02-21 PROCEDURE — 87206 SMEAR FLUORESCENT/ACID STAI: CPT | Mod: 91

## 2018-02-21 RX ORDER — MIDAZOLAM HYDROCHLORIDE 5 MG/ML
INJECTION INTRAMUSCULAR; INTRAVENOUS CODE/TRAUMA/SEDATION MEDICATION
Status: COMPLETED | OUTPATIENT
Start: 2018-02-21 | End: 2018-02-21

## 2018-02-21 RX ORDER — FENTANYL CITRATE 50 UG/ML
INJECTION, SOLUTION INTRAMUSCULAR; INTRAVENOUS CODE/TRAUMA/SEDATION MEDICATION
Status: COMPLETED | OUTPATIENT
Start: 2018-02-21 | End: 2018-02-21

## 2018-02-21 RX ORDER — LIDOCAINE HYDROCHLORIDE 10 MG/ML
INJECTION INFILTRATION; PERINEURAL CODE/TRAUMA/SEDATION MEDICATION
Status: COMPLETED | OUTPATIENT
Start: 2018-02-21 | End: 2018-02-21

## 2018-02-21 RX ORDER — LIDOCAINE HYDROCHLORIDE 20 MG/ML
INJECTION, SOLUTION INFILTRATION; PERINEURAL CODE/TRAUMA/SEDATION MEDICATION
Status: COMPLETED | OUTPATIENT
Start: 2018-02-21 | End: 2018-02-21

## 2018-02-21 RX ADMIN — LIDOCAINE HYDROCHLORIDE 5 ML: 10 INJECTION, SOLUTION INFILTRATION; PERINEURAL at 07:02

## 2018-02-21 RX ADMIN — FENTANYL CITRATE 100 MCG: 50 INJECTION, SOLUTION INTRAMUSCULAR; INTRAVENOUS at 07:02

## 2018-02-21 RX ADMIN — LIDOCAINE HYDROCHLORIDE 4 ML: 20 INJECTION, SOLUTION INFILTRATION; PERINEURAL at 07:02

## 2018-02-21 RX ADMIN — TOPICAL ANESTHETIC 0.5 ML: 200 SPRAY DENTAL; PERIODONTAL at 07:02

## 2018-02-21 RX ADMIN — MIDAZOLAM 4 MG: 5 INJECTION INTRAMUSCULAR; INTRAVENOUS at 07:02

## 2018-02-21 RX ADMIN — LIDOCAINE HYDROCHLORIDE 8 ML: 10 INJECTION, SOLUTION INFILTRATION; PERINEURAL at 07:02

## 2018-02-21 NOTE — DISCHARGE INSTRUCTIONS
Flexible Bronchoscopy  A flexible bronchoscopy is an exam of the airways of your lungs. A thin, flexible tube called a bronchoscope is used. It has a light and small camera that allow the healthcare provider to view your airways.    Before your test  · Follow your healthcare provider's instructions carefully. If you dont, the exam may be canceled. Or you may need to take it again.  · If you are taking blood-thinning medicine, ask your healthcare provider if you should stop taking the medicine before this test.  · Have no food or drink for at least 8 hours before the test. Also, avoid smoking for 24 hours before the test.  · You will need to remove any dentures or removable devices from your mouth.  · Right before the test, you will be given sedating medicines to help you relax. The medicine may be given by an IV (intravenously) into one of your veins. In addition, your nose and throat may be numbed with a special spray to help prevent gagging and coughing.  · If you are having this test as an outpatient, make sure you have an adult friend or family member to drive you home.  During your test  Bronchoscopy takes 45 to 60 minutes and includes the following steps:  · You may be given medicine (anesthesia) so that you are unconscious or asleep during the procedure.  · The healthcare provider inserts the tube into your nose or mouth.  · If you have not been given anesthesia, you might feel a gagging sensation. To help ease this feeling, you will be told to swallow or take deep breaths. Your airway will remain open even with the tube in place. But you wont be able to talk.  · The provider checks your breathing passage. He or she may also remove tiny tissue samples for biopsy.  After your test  · You may have a mild sore throat or cough. Your voice may also be hoarse.  · Don't eat or drink until the anesthesia wears off.  · If you had a biopsy, you might see traces of blood being coughed up.  When to call your  healthcare provider  Call your healthcare provider right away if you have any of the following:  · Shortness of breath  · Chest pain  · Bleeding from your nose or throat  · Coughing up a large amount of blood  · A fever above 100.4°F (38°C) for more than 24 hours  Call 911  Call 911 if you have:  · Chest pain  · Severe shortness of breath   Date Last Reviewed: 12/1/2016  © 9414-2220 [employer]. [address] [place] [address], [place] [address]. All rights reserved. This information is not intended as a substitute for professional medical care. Always follow your healthcare professional's instructions.

## 2018-02-21 NOTE — PLAN OF CARE
Discharge instructions given. Patient verbalized understanding. Consents in chart. No complaints at this time.

## 2018-02-21 NOTE — DISCHARGE SUMMARY
[facility]  Lung Transplant  Discharge Summary      Patient Name: Martin Hendrix Jr.  MRN: [id number]  Admission Date: 2/21/2018  Hospital Length of Stay: 0 days  Discharge Date and Time: 02/21/2018 8:14 AM  Attending Physician: Darrick Wolfe MD   Discharging Provider: Samantha Bill DO  Primary Care Provider: Sukhi Dunham Jr, MD     HPI: No notes on file    Procedure(s) (LRB):  flexible bronchoscopy with tissue biopsy CPT 78605 (N/A)     Hospital Course: Patient admitted for outpatient surveillance bronchoscopy.  Completed without complications.      Significant Diagnostic Studies: Bronchoscopy: See full bronchoscopy report    Pending Diagnostic Studies:     Procedure Component Value Units Date/Time    X-Ray Chest AP Portable [[id number]]     Order Status:  Sent Lab Status:  No result         Final Active Diagnoses:    Diagnosis Date Noted POA    Lung replaced by transplant [Z94.2] 08/24/2017 Not Applicable      Problems Resolved During this Admission:    Diagnosis Date Noted Date Resolved POA      Discharged Condition: good    Disposition:     Medications:  Reconciled Home Medications:   Current Discharge Medication List      CONTINUE these medications which have NOT CHANGED    Details   amLODIPine (NORVASC) 5 MG tablet Take 1 tablet (5 mg total) by mouth once daily.  Qty: 90 tablet, Refills: 3    Associated Diagnoses: Essential hypertension; Lung replaced by transplant      aspirin (ECOTRIN) 81 MG EC tablet Take 1 tablet (81 mg total) by mouth once daily.  Qty: 90 tablet, Refills: 3    Associated Diagnoses: Lung replaced by transplant      blood sugar diagnostic Strp To check BG 4 times daily, to use with insurance preferred meter (one touch verio if possible)  Qty: 350 strip, Refills: 3    Associated Diagnoses: Type 2 diabetes mellitus with hyperglycemia, with long-term current use of insulin      calcium-vitamin D tablet 600 mg-200 units Take 1 tablet by mouth 2 (two) times  daily.  Qty: 180 tablet, Refills: 3    Associated Diagnoses: Lung replaced by transplant      famotidine (PEPCID) 20 MG tablet Take 1 tablet (20 mg total) by mouth 2 (two) times daily.  Qty: 180 tablet, Refills: 3    Associated Diagnoses: Lung replaced by transplant      furosemide (LASIX) 40 MG tablet Take 1 tablet (40 mg total) by mouth once daily.  Qty: 30 tablet, Refills: 11    Associated Diagnoses: Edema of both legs      guaifenesin (MUCINEX) 600 mg 12 hr tablet Take 1 tablet (600 mg total) by mouth 2 (two) times daily.  Qty: 60 tablet, Refills: 11      insulin degludec (TRESIBA FLEXTOUCH U-200) 200 unit/mL (3 mL) InPn Inject 18 Units into the skin every evening.  Qty: 3 Syringe, Refills: 3    Associated Diagnoses: Type 2 diabetes mellitus with hyperglycemia, with long-term current use of insulin      insulin lispro (HUMALOG KWIKPEN) 100 unit/mL InPn pen 12 units with meals plus correction scale, max TDD 60 units daily  Qty: 1 Box, Refills: 3    Associated Diagnoses: Type 2 diabetes mellitus with hyperglycemia, with long-term current use of insulin      magnesium oxide (MAG-OX) 400 mg tablet Take 1 tablet (400 mg total) by mouth 2 (two) times daily.  Qty: 180 tablet, Refills: 3    Associated Diagnoses: Lung replaced by transplant      metFORMIN (GLUCOPHAGE-XR) 500 MG 24 hr tablet Take 2 tablets (1,000 mg total) by mouth 2 (two) times daily with meals.  Qty: 120 tablet, Refills: 11      multivitamin (THERAGRAN) per tablet Take 1 tablet by mouth once daily.      mycophenolate (CELLCEPT) 250 mg Cap Take 6 capsules (1,500 mg total) by mouth 2 (two) times daily. Dose increase.  Qty: 360 capsule, Refills: 11    Associated Diagnoses: Lung replaced by transplant      pravastatin (PRAVACHOL) 20 MG tablet Take 1 tablet (20 mg total) by mouth once daily.  Qty: 90 tablet, Refills: 3      predniSONE (DELTASONE) 10 MG tablet Take 5 mg by mouth once daily.      sulfamethoxazole-trimethoprim 800-160mg (BACTRIM DS) 800-160 mg  "Tab Take 1 tablet by mouth every Mon, Wed, Fri.  Qty: 45 tablet, Refills: 3    Associated Diagnoses: Lung replaced by transplant      !! tacrolimus (PROGRAF) 0.5 MG Cap Take 1 capsule (0.5 mg total) by mouth every 12 (twelve) hours.  Qty: 14 capsule, Refills: 0    Associated Diagnoses: Lung replaced by transplant      valGANciclovir (VALCYTE) 450 mg Tab Take 1 tablet (450 mg total) by mouth 2 (two) times daily.  Qty: 180 tablet, Refills: 3    Associated Diagnoses: Lung replaced by transplant      fluticasone (FLONASE) 50 mcg/actuation nasal spray 1 spray by Each Nare route once daily.  Qty: 1 Bottle, Refills: 3    Associated Diagnoses: Sinusitis, unspecified chronicity, unspecified location      folic acid (FOLVITE) 1 MG tablet Take 1 tablet (1 mg total) by mouth once daily.  Qty: 90 tablet, Refills: 3    Associated Diagnoses: Lung replaced by transplant      lancets Misc To check BG 4 times daily, to use with insurance preferred meter (one touch verio if possible)  Qty: 350 each, Refills: 3    Associated Diagnoses: Type 2 diabetes mellitus with hyperglycemia, with long-term current use of insulin      oxycodone-acetaminophen (PERCOCET) 7.5-325 mg per tablet Take 1 tablet by mouth every 4 (four) hours as needed.  Qty: 40 tablet, Refills: 0      pen needle, diabetic (BD ULTRA-FINE JIM PEN NEEDLES) 32 gauge x 5/32" Ndle Uses 4 times daily, on multiple daily insulin injections  Qty: 350 each, Refills: 3    Associated Diagnoses: Type 2 diabetes mellitus with hyperglycemia, with long-term current use of insulin      !! tacrolimus (PROGRAF) 0.5 MG Cap Take 2 capsules (1 mg total) by mouth every 12 (twelve) hours.  Qty: 120 capsule, Refills: 11    Associated Diagnoses: Lung replaced by transplant       !! - Potential duplicate medications found. Please discuss with provider.      STOP taking these medications       ACCU-CHEK DEANNE PLUS METER Misc Comments:   Reason for Stopping:             Patient Instructions:     Diet " general     Call MD for:  temperature >101     Call MD for:  coughing up blood greater than 3 tablespoons in volume     Call MD for:  chest pain     Call MD for:  difficulty breathing or shortness of breath     Call MD for:  development of yellow/green sputum       Follow Up:  Follow-up Information     Darrick Wolfe MD.    Specialty:  Transplant  Why:  As needed  Contact information:  [address]  [facility] [address]  [phone]                   Samantha Bill, DO  Lung Transplant  [facility]

## 2018-02-21 NOTE — H&P (VIEW-ONLY)
LUNG TRANSPLANT RECIPIENT FOLLOW-UP    Reason for Visit: Follow-up after lung transplantation.                                                                                                         Date of Transplant: 8/22/17                                                                               Reason for Transplant: Sarcoidosis with pulmonary hypertension     Type of Transplant: bilateral sequential lung     CMV Status: D+ / R-      Major Complications:   1. Left vocal cord dysfunction  2. 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 is a 57 y.o. year old male with the above listed transplant history who presents today for routine follow-up.  Since his last visit, he has gone back to work full time.  He denies any dyspnea, cough, productive sputum, or sick contacts.  His appetite is good and he has had no nausea.  Denies any fever/chills/nightsweats, hematemesis, or diarrhea.  Overall, feels well.      Review of Systems   Constitutional: Negative for chills, diaphoresis, fever, malaise/fatigue and weight loss.   HENT: Negative for congestion, ear discharge, ear pain, hearing loss, nosebleeds, sinus pain, sore throat and tinnitus.    Eyes: Negative for blurred vision, double vision, photophobia, pain, discharge and redness.   Respiratory: Negative for cough, hemoptysis, sputum production, shortness of breath, wheezing and stridor.    Cardiovascular: Negative for chest pain, palpitations, orthopnea, claudication, leg swelling and PND.   Gastrointestinal: Negative for abdominal pain, blood in stool, constipation, diarrhea, heartburn, melena, nausea and vomiting.   Genitourinary: Negative for dysuria, flank pain, frequency, hematuria and urgency.   Musculoskeletal: Negative for back pain, falls, joint pain, myalgias and neck pain.   Skin: Negative for itching and rash.   Neurological: Negative for  "dizziness, tingling, tremors, sensory change, speech change, focal weakness, seizures, loss of consciousness, weakness and headaches.   Endo/Heme/Allergies: Negative for environmental allergies and polydipsia. Does not bruise/bleed easily.   Psychiatric/Behavioral: Negative for depression, hallucinations, memory loss and substance abuse. The patient is not nervous/anxious and does not have insomnia.      /78 (BP Location: Left arm, Patient Position: Sitting, BP Method: Medium (Automatic))   Pulse 82   Temp 98 °F (36.7 °C) (Oral)   Resp 20   Ht 5' 10" (1.778 m)   Wt 89.8 kg (198 lb)   SpO2 99%   BMI 28.41 kg/m²     Physical Exam   Constitutional: He is oriented to person, place, and time and well-developed, well-nourished, and in no distress. No distress.   HENT:   Head: Normocephalic and atraumatic.   Right Ear: External ear normal.   Left Ear: External ear normal.   Nose: Nose normal.   Mouth/Throat: Oropharynx is clear and moist. No oropharyngeal exudate.   Eyes: Conjunctivae and EOM are normal. Pupils are equal, round, and reactive to light. Right eye exhibits no discharge. Left eye exhibits no discharge. No scleral icterus.   Neck: Normal range of motion. Neck supple. No JVD present. No tracheal deviation present. No thyromegaly present.   Cardiovascular: Normal rate, regular rhythm, normal heart sounds and intact distal pulses.  Exam reveals no gallop and no friction rub.    No murmur heard.  Pulmonary/Chest: Effort normal and breath sounds normal. No stridor. No respiratory distress. He has no wheezes. He has no rales. He exhibits no tenderness.   Abdominal: Soft. Bowel sounds are normal. He exhibits no distension and no mass. There is no tenderness. There is no rebound and no guarding.   Musculoskeletal: Normal range of motion. He exhibits no edema, tenderness or deformity.   Lymphadenopathy:     He has no cervical adenopathy.   Neurological: He is alert and oriented to person, place, and time. " He displays normal reflexes. No cranial nerve deficit. He exhibits normal muscle tone. Gait normal. Coordination normal. GCS score is 15.   Skin: Skin is warm and dry. No rash noted. He is not diaphoretic. No erythema. No pallor.   Psychiatric: Mood, memory, affect and judgment normal.   Nursing note and vitals reviewed.    Labs:  cbc, cmp, tacrolimus Latest Ref Rng & Units 1/16/2018 2/19/2018 2/19/2018   TACROLIMUS LVL 5.0 - 15.0 ng/mL 9.2 - -   WHITE BLOOD CELL COUNT 3.90 - 12.70 K/uL 15.47(H) 9.15 -   RBC 4.60 - 6.20 M/uL 4.07(L) 3.92(L) -   HEMOGLOBIN 14.0 - 18.0 g/dL 12.9(L) 12.0(L) -   HEMATOCRIT 40.0 - 54.0 % 38.6(L) 37.2(L) -   MCV 82 - 98 fL 95 95 -   MCH 27.0 - 31.0 pg 31.7(H) 30.6 -   MCHC 32.0 - 36.0 g/dL 33.4 32.3 -   RDW 11.5 - 14.5 % 12.9 12.1 -   PLATELETS 150 - 350 K/uL 450(H) 367(H) -   MPV 9.2 - 12.9 fL 8.7(L) 8.3(L) -   GRAN # 1.8 - 7.7 K/uL 13.6(H) 7.6 -   LYMPH # 1.0 - 4.8 K/uL 0.6(L) 0.7(L) -   MONO # 0.3 - 1.0 K/uL 0.8 0.5 -   EOSINOPHIL% 0.0 - 8.0 % 0.3 1.3 -   BASOPHIL% 0.0 - 1.9 % 0.3 0.9 -   DIFFERENTIAL METHOD - Automated Automated -   SODIUM 136 - 145 mmol/L 138 141 141   POTASSIUM 3.5 - 5.1 mmol/L 4.2 4.2 4.2   CHLORIDE 95 - 110 mmol/L 99 105 105   CO2 23 - 29 mmol/L 27 27 27   GLUCOSE 70 - 110 mg/dL 145(H) 119(H) 119(H)   BUN BLD 6 - 20 mg/dL 37(H) 20 20   CREATININE 0.5 - 1.4 mg/dL 1.1 0.9 0.9   CALCIUM 8.7 - 10.5 mg/dL 9.4 9.5 9.5   PROTEIN TOTAL 6.0 - 8.4 g/dL 6.5 6.7 6.7   ALBUMIN 3.5 - 5.2 g/dL 3.7 3.8 3.8   BILIRUBIN TOTAL 0.1 - 1.0 mg/dL 0.6 0.2 0.2   ALK PHOS 55 - 135 U/L 95 72 72   AST 10 - 40 U/L 36 20 20   ALT 10 - 44 U/L 32 16 16   ANION GAP 8 - 16 mmol/L 12 9 9   EGFR IF AFRICAN AMERICAN >60 mL/min/1.73 m:2 >60.0 >60.0 >60.0   EGFR IF NON-AFRICAN AMERICAN >60 mL/min/1.73 m:2 >60.0 >60.0 >60.0     Pulmonary Function Tests 2/19/2018 1/16/2018 12/18/2017 11/16/2017 10/26/2017 10/10/2017 9/26/2017   FVC 3.4 3.5 3.3 3 2.71 2.84 2.54   FEV1 2.79 2.83 2.69 2.53 2.41 2.57 2.26    TLC (liters) - - - - - - -   DLCO (ml/mmHg sec) - - - - - - -   FVC% 74 76 72 65 59 61 53   FEV1% 75 76 72 67 64 69 58   FEF 25-75 2.77 2.82 2.62 2.51 2.73 3.26 2.69   FEF 25-75% 73 74 69 65 71 85 68   TLC% - - - - - - -   RV - - - - - - -   RV% - - - - - - -   DLCO% - - - - - - -       Imaging:  Results for orders placed during the hospital encounter of 01/16/18   X-Ray Chest PA And Lateral    Narrative Patient had bilateral lung transplant.  Postoperative changes are noted.  The cardiac size is not enlarged.  Lungs are clear.  No infiltrate is noted.    Impression  Satisfactory postoperative findings      Electronically signed by: Carlton Parker MD  Date:     01/16/18  Time:    08:10        Assessment:  1. Encounter following organ transplant    2. Lung replaced by transplant    3. Immunosuppression    4. Prophylactic antibiotic      Plan:   1. FEV1 remains unchanged.  Symptomatically doing very well.  Will continue to monitor at routine visits.  Surveillance bronchoscopy this Wednesday.    2. Continue with Tacrolimus, Prednisone, and Mycophenolate.  Labs reviewed.  Will stop voriconazole so will need to adjust tacrolimus dosage. Tacrolimus level pending today.    3. Continue with Valcyte and Bactrim.  Will stop Voriconazole.    4. Follow-up in 1 month or earlier if needed.    Irwin Celestin NP  Lung Transplant  [id number]

## 2018-02-21 NOTE — SEDATION DOCUMENTATION
BAL RML 90 ml nacl in 45 ml return also send for Galactomannan, TBBX RLL, [place] micro L main.  Verbal report given to DOSC to include documentation charted in procedural sedation documentation.  Patient to be NPO 1 hour post procedure and place in PO tolerance at 0900, CXR needed at bedside.  Moderate concious sedation was performed and cardiorespiratory functions were monitored the entire procedure by Ranjana Kimbrough RN.  Sedation began at 0751  and concluded at 0830.  Ranjana Kimbrough RN

## 2018-02-22 LAB — GALACTOMANNAN AG SPEC-ACNC: <0.5 INDEX

## 2018-02-23 LAB
BACTERIA SPEC AEROBE CULT: NO GROWTH
BACTERIA SPEC AEROBE CULT: NORMAL
CMV DNA SPEC QL NAA+PROBE: NOT DETECTED
ENTEROVIRUS: POSITIVE
GRAM STN SPEC: NORMAL
HUMAN BOCAVIRUS: NOT DETECTED
HUMAN CORONAVIRUS, COMMON COLD VIRUS: NOT DETECTED
INFLUENZA A - H1N1-09: NOT DETECTED
LEGIONELLA PNEUMOPHILA: NOT DETECTED
MORAXELLA CATARRHALIS: NOT DETECTED
PARAINFLUENZA: NOT DETECTED
RVP - ADENOVIRUS: NOT DETECTED
RVP - HUMAN METAPNEUMOVIRUS (HMPV): NOT DETECTED
RVP - INFLUENZA A: NOT DETECTED
RVP - INFLUENZA B: NOT DETECTED
RVP - RESPIRATORY SYNCTIAL VIRUS (RSV) A: NOT DETECTED
RVP - RESPIRATORY VIRAL PANEL, SOURCE: ABNORMAL
RVP - RHINOVIRUS: NOT DETECTED
SPECIMEN SOURCE: NORMAL
TEM - ACINETOBACTER BAUMANNII: NOT DETECTED
TEM - BORDETELLA PERTUSSIS: NOT DETECTED
TEM - CHLAMYDOPHILA PNEUMONIAE: NOT DETECTED
TEM - KLEBSIELLA PNEUMONIAE: NOT DETECTED
TEM - MRSA: NOT DETECTED
TEM - MYCOPLASMA PNEUMONIAE: NOT DETECTED
TEM - NEISSERIA MENINGITIDIS: NOT DETECTED
TEM - PANTON-VALENTINE: NOT DETECTED
TEM - PSEUDOMONAS AERUGINOSA: NOT DETECTED
TEM - STAPHYLOCOCCUS AUREUS: NOT DETECTED
TEM - STREPTOCOCCUS PNEUMONIAE: NOT DETECTED
TEM - STREPTOCOCCUS PYOGENES A: NOT DETECTED
TEM- HAEMOPHILUS INFLUENZAE B: NOT DETECTED
TEM- HAEMOPHILUS INFLUENZAE: NOT DETECTED

## 2018-03-01 ENCOUNTER — LAB VISIT (OUTPATIENT)
Dept: LAB | Facility: HOSPITAL | Age: 58
End: 2018-03-01
Attending: INTERNAL MEDICINE
Payer: COMMERCIAL

## 2018-03-01 DIAGNOSIS — Z94.2 LUNG REPLACED BY TRANSPLANT: ICD-10-CM

## 2018-03-01 LAB
ANION GAP SERPL CALC-SCNC: 8 MMOL/L
BUN SERPL-MCNC: 15 MG/DL
CALCIUM SERPL-MCNC: 9.7 MG/DL
CHLORIDE SERPL-SCNC: 104 MMOL/L
CO2 SERPL-SCNC: 28 MMOL/L
CREAT SERPL-MCNC: 0.9 MG/DL
EST. GFR  (AFRICAN AMERICAN): >60 ML/MIN/1.73 M^2
EST. GFR  (NON AFRICAN AMERICAN): >60 ML/MIN/1.73 M^2
GLUCOSE SERPL-MCNC: 120 MG/DL
POTASSIUM SERPL-SCNC: 4.7 MMOL/L
SODIUM SERPL-SCNC: 140 MMOL/L

## 2018-03-01 PROCEDURE — 80048 BASIC METABOLIC PNL TOTAL CA: CPT

## 2018-03-01 PROCEDURE — 36415 COLL VENOUS BLD VENIPUNCTURE: CPT | Mod: PO

## 2018-03-01 PROCEDURE — 80197 ASSAY OF TACROLIMUS: CPT

## 2018-03-02 ENCOUNTER — PATIENT MESSAGE (OUTPATIENT)
Dept: TRANSPLANT | Facility: CLINIC | Age: 58
End: 2018-03-02

## 2018-03-02 DIAGNOSIS — Z94.2 LUNG REPLACED BY TRANSPLANT: ICD-10-CM

## 2018-03-02 LAB — TACROLIMUS BLD-MCNC: 4.3 NG/ML

## 2018-03-02 RX ORDER — TACROLIMUS 0.5 MG/1
2 CAPSULE ORAL EVERY 12 HOURS
Qty: 240 CAPSULE | Refills: 11 | Status: SHIPPED | OUTPATIENT
Start: 2018-03-02 | End: 2018-03-08 | Stop reason: SDUPTHER

## 2018-03-02 NOTE — TELEPHONE ENCOUNTER
Received written orders from Dr. Bill instructing patient to increase prograf to 2 mg bid from 1 mg bid.  My [facility] message sent to patient instructing him to increase Prograf to 2 mg twice a day.  Patient was asked to have repeat labs on Monday 3/5/18 in [place].  Asked patient to respond to confirm that message was received.

## 2018-03-07 ENCOUNTER — LAB VISIT (OUTPATIENT)
Dept: LAB | Facility: HOSPITAL | Age: 58
End: 2018-03-07
Attending: INTERNAL MEDICINE
Payer: COMMERCIAL

## 2018-03-07 DIAGNOSIS — Z94.2 LUNG REPLACED BY TRANSPLANT: ICD-10-CM

## 2018-03-07 LAB
ANION GAP SERPL CALC-SCNC: 10 MMOL/L
BUN SERPL-MCNC: 17 MG/DL
CALCIUM SERPL-MCNC: 9.9 MG/DL
CHLORIDE SERPL-SCNC: 104 MMOL/L
CO2 SERPL-SCNC: 29 MMOL/L
CREAT SERPL-MCNC: 0.8 MG/DL
EST. GFR  (AFRICAN AMERICAN): >60 ML/MIN/1.73 M^2
EST. GFR  (NON AFRICAN AMERICAN): >60 ML/MIN/1.73 M^2
GLUCOSE SERPL-MCNC: 114 MG/DL
POTASSIUM SERPL-SCNC: 4.8 MMOL/L
SODIUM SERPL-SCNC: 143 MMOL/L

## 2018-03-07 PROCEDURE — 36415 COLL VENOUS BLD VENIPUNCTURE: CPT | Mod: PO

## 2018-03-07 PROCEDURE — 80197 ASSAY OF TACROLIMUS: CPT

## 2018-03-07 PROCEDURE — 80048 BASIC METABOLIC PNL TOTAL CA: CPT

## 2018-03-08 DIAGNOSIS — Z94.2 LUNG REPLACED BY TRANSPLANT: ICD-10-CM

## 2018-03-08 LAB — TACROLIMUS BLD-MCNC: 7.5 NG/ML

## 2018-03-08 RX ORDER — TACROLIMUS 0.5 MG/1
CAPSULE ORAL
Qty: 270 CAPSULE | Refills: 11 | Status: ON HOLD | OUTPATIENT
Start: 2018-03-08 | End: 2018-03-26

## 2018-03-08 NOTE — TELEPHONE ENCOUNTER
----- Message from Samantha Bill DO sent at 3/8/2018  2:29 PM CST -----  Increase tac to 2mg AM and 2.5mg PM.

## 2018-03-08 NOTE — TELEPHONE ENCOUNTER
Received written orders from Dr. Bill instructing patient to increase prograf.  Verified dose change with Dr. Bill that patient will increase AM dose to 2.5 mg from 2 mg bid. Contacted patient and notified him of the above dose change.  Labs will be repeat on 3/12/18 in [place].  Patient repeated the dose change and verbalized understanding.

## 2018-03-12 ENCOUNTER — LAB VISIT (OUTPATIENT)
Dept: LAB | Facility: HOSPITAL | Age: 58
End: 2018-03-12
Attending: INTERNAL MEDICINE
Payer: COMMERCIAL

## 2018-03-12 DIAGNOSIS — Z94.2 LUNG REPLACED BY TRANSPLANT: ICD-10-CM

## 2018-03-12 LAB
ANION GAP SERPL CALC-SCNC: 10 MMOL/L
BUN SERPL-MCNC: 16 MG/DL
CALCIUM SERPL-MCNC: 9.8 MG/DL
CHLORIDE SERPL-SCNC: 105 MMOL/L
CO2 SERPL-SCNC: 29 MMOL/L
CREAT SERPL-MCNC: 0.8 MG/DL
EST. GFR  (AFRICAN AMERICAN): >60 ML/MIN/1.73 M^2
EST. GFR  (NON AFRICAN AMERICAN): >60 ML/MIN/1.73 M^2
GLUCOSE SERPL-MCNC: 113 MG/DL
POTASSIUM SERPL-SCNC: 4.7 MMOL/L
SODIUM SERPL-SCNC: 144 MMOL/L

## 2018-03-12 PROCEDURE — 80048 BASIC METABOLIC PNL TOTAL CA: CPT

## 2018-03-12 PROCEDURE — 36415 COLL VENOUS BLD VENIPUNCTURE: CPT | Mod: PO

## 2018-03-12 PROCEDURE — 80197 ASSAY OF TACROLIMUS: CPT

## 2018-03-13 LAB — TACROLIMUS BLD-MCNC: 10.2 NG/ML

## 2018-03-19 ENCOUNTER — OFFICE VISIT (OUTPATIENT)
Dept: TRANSPLANT | Facility: CLINIC | Age: 58
End: 2018-03-19
Payer: COMMERCIAL

## 2018-03-19 ENCOUNTER — PATIENT OUTREACH (OUTPATIENT)
Dept: DIABETES | Facility: CLINIC | Age: 58
End: 2018-03-19

## 2018-03-19 ENCOUNTER — HOSPITAL ENCOUNTER (OUTPATIENT)
Dept: RADIOLOGY | Facility: HOSPITAL | Age: 58
Discharge: HOME | End: 2018-03-19
Attending: INTERNAL MEDICINE
Payer: COMMERCIAL

## 2018-03-19 ENCOUNTER — HOSPITAL ENCOUNTER (OUTPATIENT)
Dept: PULMONOLOGY | Facility: CLINIC | Age: 58
Discharge: HOME | End: 2018-03-19
Payer: COMMERCIAL

## 2018-03-19 ENCOUNTER — PATIENT MESSAGE (OUTPATIENT)
Dept: TRANSPLANT | Facility: CLINIC | Age: 58
End: 2018-03-19

## 2018-03-19 ENCOUNTER — LAB VISIT (OUTPATIENT)
Dept: LAB | Facility: HOSPITAL | Age: 58
End: 2018-03-19
Attending: INTERNAL MEDICINE
Payer: COMMERCIAL

## 2018-03-19 VITALS
HEIGHT: 70 IN | TEMPERATURE: 98 F | RESPIRATION RATE: 20 BRPM | OXYGEN SATURATION: 98 % | BODY MASS INDEX: 30.35 KG/M2 | WEIGHT: 212 LBS | HEART RATE: 82 BPM | SYSTOLIC BLOOD PRESSURE: 144 MMHG | DIASTOLIC BLOOD PRESSURE: 84 MMHG

## 2018-03-19 DIAGNOSIS — T86.819 COMPLICATION OF TRANSPLANTED LUNG, UNSPECIFIED COMPLICATION: Primary | ICD-10-CM

## 2018-03-19 DIAGNOSIS — Z94.2 LUNG REPLACED BY TRANSPLANT: ICD-10-CM

## 2018-03-19 DIAGNOSIS — D84.9 IMMUNOSUPPRESSION: ICD-10-CM

## 2018-03-19 DIAGNOSIS — Z48.298 ENCOUNTER FOLLOWING ORGAN TRANSPLANT: Primary | ICD-10-CM

## 2018-03-19 DIAGNOSIS — E83.42 HYPOMAGNESEMIA: ICD-10-CM

## 2018-03-19 DIAGNOSIS — E83.42 HYPOMAGNESEMIA: Primary | ICD-10-CM

## 2018-03-19 DIAGNOSIS — J38.3 VOCAL CORD DYSFUNCTION: ICD-10-CM

## 2018-03-19 DIAGNOSIS — Z79.2 PROPHYLACTIC ANTIBIOTIC: ICD-10-CM

## 2018-03-19 LAB
ABSOLUTE CD3: 412 CELLS/UL (ref 700–2100)
ALBUMIN SERPL BCP-MCNC: 4 G/DL
ALP SERPL-CCNC: 83 U/L
ALT SERPL W/O P-5'-P-CCNC: 15 U/L
ANION GAP SERPL CALC-SCNC: 9 MMOL/L
ANISOCYTOSIS BLD QL SMEAR: SLIGHT
AST SERPL-CCNC: 18 U/L
BASOPHILS # BLD AUTO: ABNORMAL K/UL
BASOPHILS NFR BLD: 0 %
BILIRUB SERPL-MCNC: 0.5 MG/DL
BUN SERPL-MCNC: 16 MG/DL
CALCIUM SERPL-MCNC: 9.8 MG/DL
CD3%: 62.8 % (ref 55–83)
CHLORIDE SERPL-SCNC: 103 MMOL/L
CO2 SERPL-SCNC: 29 MMOL/L
CREAT SERPL-MCNC: 0.9 MG/DL
DIFFERENTIAL METHOD: ABNORMAL
EOSINOPHIL # BLD AUTO: ABNORMAL K/UL
EOSINOPHIL NFR BLD: 0 %
ERYTHROCYTE [DISTWIDTH] IN BLOOD BY AUTOMATED COUNT: 11.9 %
EST. GFR  (AFRICAN AMERICAN): >60 ML/MIN/1.73 M^2
EST. GFR  (NON AFRICAN AMERICAN): >60 ML/MIN/1.73 M^2
GLUCOSE SERPL-MCNC: 137 MG/DL
HCT VFR BLD AUTO: 37.5 %
HGB BLD-MCNC: 11.8 G/DL
IMM GRANULOCYTES # BLD AUTO: ABNORMAL K/UL
IMM GRANULOCYTES NFR BLD AUTO: ABNORMAL %
LYMPHOCYTES # BLD AUTO: ABNORMAL K/UL
LYMPHOCYTES NFR BLD: 6 %
MAGNESIUM SERPL-MCNC: 1.2 MG/DL
MCH RBC QN AUTO: 29.2 PG
MCHC RBC AUTO-ENTMCNC: 31.5 G/DL
MCV RBC AUTO: 93 FL
MONOCYTES # BLD AUTO: ABNORMAL K/UL
MONOCYTES NFR BLD: 5 %
NEUTROPHILS NFR BLD: 89 %
NRBC BLD-RTO: 0 /100 WBC
PLATELET # BLD AUTO: 406 K/UL
PLATELET BLD QL SMEAR: ABNORMAL
PMV BLD AUTO: 8.4 FL
POTASSIUM SERPL-SCNC: 4.2 MMOL/L
PRE FEV1 FVC: 81
PRE FEV1: 2.61
PRE FVC: 3.21
PREDICTED FEV1 FVC: 80
PREDICTED FEV1: 3.71
PREDICTED FVC: 4.57
PROT SERPL-MCNC: 6.5 G/DL
RBC # BLD AUTO: 4.04 M/UL
SODIUM SERPL-SCNC: 141 MMOL/L
TACROLIMUS BLD-MCNC: 13.9 NG/ML
WBC # BLD AUTO: 8.22 K/UL

## 2018-03-19 PROCEDURE — 86832 HLA CLASS I HIGH DEFIN QUAL: CPT | Mod: 91,PO,TXP

## 2018-03-19 PROCEDURE — 99999 PR PBB SHADOW E&M-EST. PATIENT-LVL III: CPT | Mod: PBBFAC,,, | Performed by: INTERNAL MEDICINE

## 2018-03-19 PROCEDURE — 86977 RBC SERUM PRETX INCUBJ/INHIB: CPT | Mod: 91,PO,TXP

## 2018-03-19 PROCEDURE — 36415 COLL VENOUS BLD VENIPUNCTURE: CPT

## 2018-03-19 PROCEDURE — 86359 T CELLS TOTAL COUNT: CPT | Mod: NTX

## 2018-03-19 PROCEDURE — 83735 ASSAY OF MAGNESIUM: CPT

## 2018-03-19 PROCEDURE — 71046 X-RAY EXAM CHEST 2 VIEWS: CPT | Mod: TC,FY

## 2018-03-19 PROCEDURE — 85027 COMPLETE CBC AUTOMATED: CPT | Mod: NTX

## 2018-03-19 PROCEDURE — 80053 COMPREHEN METABOLIC PANEL: CPT

## 2018-03-19 PROCEDURE — 99214 OFFICE O/P EST MOD 30 MIN: CPT | Mod: 25,S$GLB,, | Performed by: INTERNAL MEDICINE

## 2018-03-19 PROCEDURE — 80197 ASSAY OF TACROLIMUS: CPT | Mod: NTX

## 2018-03-19 PROCEDURE — 85007 BL SMEAR W/DIFF WBC COUNT: CPT

## 2018-03-19 PROCEDURE — 94010 BREATHING CAPACITY TEST: CPT | Mod: S$GLB,,, | Performed by: INTERNAL MEDICINE

## 2018-03-19 PROCEDURE — 86833 HLA CLASS II HIGH DEFIN QUAL: CPT | Mod: PO,NTX

## 2018-03-19 PROCEDURE — 71046 X-RAY EXAM CHEST 2 VIEWS: CPT | Mod: 26,,, | Performed by: RADIOLOGY

## 2018-03-19 PROCEDURE — 86352 CELL FUNCTION ASSAY W/STIM: CPT

## 2018-03-19 RX ORDER — LANOLIN ALCOHOL/MO/W.PET/CERES
400 CREAM (GRAM) TOPICAL 3 TIMES DAILY
Qty: 270 TABLET | Refills: 3 | Status: SHIPPED | OUTPATIENT
Start: 2018-03-19 | End: 2018-09-10 | Stop reason: SDUPTHER

## 2018-03-19 NOTE — PROGRESS NOTES
LUNG TRANSPLANT RECIPIENT FOLLOW-UP     Reason for Visit: Follow-up after lung transplantation.                               Date of Transplant: 8/22/17     Reason for Transplant: Sarcoidosis with pulmonary hypertension     Type of Transplant: bilateral sequential lung     CMV Status: D+ / R-      Major Complications:   1. Left vocal cord dysfunction  2. 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 is a 57 y.o. year old male with the above listed transplant history who presents today for routine follow-up.  Since his last visit 1 month ago he has felt well however complains of intermittent cough which he relates to sinus problems. No fever/chills, no sick contacts. Has been back at work for over 1 month and feels like he is slowly getting back up to speed. He underwent surveillance bronchoscopy on 2/21/18 which was A0 however inadequate sample and culture negative. During the surveillance bronch, a medium sized partially obstructing nodule was found on the right arytenoid. He underwent injection for left vocal cord dysfunction in sept 2017 and has an appt on Wednesday to see ENT about this new finding.  He states his voice has improved a little, but has not improved to the degree he had hoped.     Review of Systems   Constitutional: Negative for chills, diaphoresis, fever, malaise/fatigue and weight loss.   HENT: Negative for congestion, ear discharge, ear pain, hearing loss, nosebleeds, sinus pain, sore throat and tinnitus.    Eyes: Negative for blurred vision, double vision, photophobia, pain, discharge and redness.   Respiratory: Negative for cough, hemoptysis, sputum production, shortness of breath, wheezing and stridor.    Cardiovascular: Negative for chest pain, palpitations, orthopnea, claudication, leg swelling and PND.   Gastrointestinal: Negative for abdominal pain, blood in stool, constipation,  "diarrhea, heartburn, melena, nausea and vomiting.   Genitourinary: Negative for dysuria, flank pain, frequency, hematuria and urgency.   Musculoskeletal: Negative for back pain, falls, joint pain, myalgias and neck pain.   Skin: Negative for itching and rash.   Neurological: Negative for dizziness, tingling, tremors, sensory change, speech change, focal weakness, seizures, loss of consciousness, weakness and headaches.   Endo/Heme/Allergies: Negative for environmental allergies and polydipsia. Does not bruise/bleed easily.   Psychiatric/Behavioral: Negative for depression, hallucinations, memory loss and substance abuse. The patient is not nervous/anxious and does not have insomnia.      BP (!) 144/84   Pulse 82   Temp 97.8 °F (36.6 °C) (Oral)   Resp 20   Ht 5' 10" (1.778 m)   Wt 96.2 kg (212 lb)   SpO2 98%   BMI 30.42 kg/m²       Physical Exam   Constitutional: He is oriented to person, place, and time and well-developed, well-nourished, and in no distress. No distress.   HENT:   Head: Normocephalic and atraumatic.   Right Ear: External ear normal.   Left Ear: External ear normal.   Nose: Nose normal.   Mouth/Throat: Oropharynx is clear and moist. No oropharyngeal exudate.   Eyes: Conjunctivae and EOM are normal. Pupils are equal, round, and reactive to light. Right eye exhibits no discharge. Left eye exhibits no discharge. No scleral icterus.   Neck: Normal range of motion. Neck supple. No JVD present. No tracheal deviation present. No thyromegaly present.   Cardiovascular: Normal rate, regular rhythm, normal heart sounds and intact distal pulses.  Exam reveals no gallop and no friction rub.    No murmur heard.  Pulmonary/Chest: Effort normal and breath sounds normal. No stridor. No respiratory distress. He has no wheezes. He has no rales. He exhibits no tenderness.   Abdominal: Soft. Bowel sounds are normal. He exhibits no distension and no mass. There is no tenderness. There is no rebound and no guarding. "   Musculoskeletal: Normal range of motion. He exhibits no edema, tenderness or deformity.   Lymphadenopathy:     He has no cervical adenopathy.   Neurological: He is alert and oriented to person, place, and time. He displays normal reflexes. No cranial nerve deficit. He exhibits normal muscle tone. Gait normal. Coordination normal. GCS score is 15.   Skin: Skin is warm and dry. No rash noted. He is not diaphoretic. No erythema. No pallor.   Psychiatric: Mood, memory, affect and judgment normal.   Nursing note and vitals reviewed.    Labs:  cbc, cmp, tacrolimus Latest Ref Rng & Units 3/7/2018 3/12/2018 3/19/2018   TACROLIMUS LVL 5.0 - 15.0 ng/mL 7.5 10.2 -   WHITE BLOOD CELL COUNT 3.90 - 12.70 K/uL - - 8.22   RBC 4.60 - 6.20 M/uL - - 4.04(L)   HEMOGLOBIN 14.0 - 18.0 g/dL - - 11.8(L)   HEMATOCRIT 40.0 - 54.0 % - - 37.5(L)   MCV 82 - 98 fL - - 93   MCH 27.0 - 31.0 pg - - 29.2   MCHC 32.0 - 36.0 g/dL - - 31.5(L)   RDW 11.5 - 14.5 % - - 11.9   PLATELETS 150 - 350 K/uL - - 406(H)   MPV 9.2 - 12.9 fL - - 8.4(L)   GRAN # 1.8 - 7.7 K/uL - - -   LYMPH # 1.0 - 4.8 K/uL - - CANCELED   MONO # 0.3 - 1.0 K/uL - - CANCELED   EOSINOPHIL% 0.0 - 8.0 % - - 0.0   BASOPHIL% 0.0 - 1.9 % - - 0.0   DIFFERENTIAL METHOD - - - Manual   SODIUM 136 - 145 mmol/L 143 144 141   POTASSIUM 3.5 - 5.1 mmol/L 4.8 4.7 4.2   CHLORIDE 95 - 110 mmol/L 104 105 103   CO2 23 - 29 mmol/L 29 29 29   GLUCOSE 70 - 110 mg/dL 114(H) 113(H) 137(H)   BUN BLD 6 - 20 mg/dL 17 16 16   CREATININE 0.5 - 1.4 mg/dL 0.8 0.8 0.9   CALCIUM 8.7 - 10.5 mg/dL 9.9 9.8 9.8   PROTEIN TOTAL 6.0 - 8.4 g/dL - - 6.5   ALBUMIN 3.5 - 5.2 g/dL - - 4.0   BILIRUBIN TOTAL 0.1 - 1.0 mg/dL - - 0.5   ALK PHOS 55 - 135 U/L - - 83   AST 10 - 40 U/L - - 18   ALT 10 - 44 U/L - - 15   ANION GAP 8 - 16 mmol/L 10 10 9   EGFR IF AFRICAN AMERICAN >60 mL/min/1.73 m:2 >60.0 >60.0 >60.0   EGFR IF NON-AFRICAN AMERICAN >60 mL/min/1.73 m:2 >60.0 >60.0 >60.0     Pulmonary Function Tests 3/19/2018 2/19/2018  1/16/2018 12/18/2017 11/16/2017 10/26/2017 10/10/2017   FVC 3.21 3.4 3.5 3.3 3 2.71 2.84   FEV1 2.61 2.79 2.83 2.69 2.53 2.41 2.57   TLC (liters) - - - - - - -   DLCO (ml/mmHg sec) - - - - - - -   FVC% 70 74 76 72 65 59 61   FEV1% 70 75 76 72 67 64 69   FEF 25-75 2.53 2.77 2.82 2.62 2.51 2.73 3.26   FEF 25-75% 67 73 74 69 65 71 85   TLC% - - - - - - -   RV - - - - - - -   RV% - - - - - - -   DLCO% - - - - - - -       Imaging:  Results for orders placed during the hospital encounter of 03/19/18   X-Ray Chest PA And Lateral    Narrative EXAMINATION:  XR CHEST PA AND LATERAL  CLINICAL HISTORY:  s/p bilateral lung transplant; Lung transplant status sarcoidosis.  TECHNIQUE:  PA and lateral views of the chest were performed.  COMPARISON:  Posttransplant chest radiographs: 02/21/2018.  02/19/2018.  10/26/2018.  08/22/2017.  Preoperative chest radiographs: 08/21/2017.  04/24/2017.  FINDINGS:  The patient is status post bilateral sequential lung transplants.  Sternal sutures are intact and aligned.  There is a small amount of dependent left pleural fluid obscuring the left lateral and posterior costophrenic angles.  I detect no right pleural fluid.  Mediastinal structures are midline. Lateral margin of the descending thoracic aorta is well defined on PA view.  These findings are similar to earlier studies.  On lateral view there is a large lucent region, at least 8.5 cm) projected over the posterior and inferior aspect of the left hemithorax; this region overlies the lower thoracic spine and abuts the superior margin of the subpulmonic left pleural fluid.  Similar findings are present on earlier postoperative lateral radiographs including 01/16/2018, 10/26/2017, 09/25/2017 and perhaps 08/27/201.  It may represent air trapping in a portion of the left lower lobe or partial atelectasis of the left lower lobe with over expansion of the posterior aspect of the apical posterior segment of the left upper lobe and less likely over  expansion of the superior segment of the lingula.  Chest CT would provide more sensitive evaluation of pulmonary parenchyma and airways.  I detect no new pulmonary abnormality and no right pleural fluid, pneumothorax, pneumomediastinum, pneumoperitoneum or significant osseous abnormality.    Impression Lucent region seen on lateral view overlying the lower thoracic spine and abutting the left subpulmonic pleural fluid is of uncertain significance.  Chest CT might clarify this finding.  Radiographic appearance is stable compared to multiple prior studies dating back to at least 09/25/2017.  No new disease identified.  This report was flagged in Epic as abnormal.  Electronically signed by: Sena Baum MD  Date:    03/19/2018  Time:    08:14       Assessment:  1. Encounter following organ transplant    2. Lung replaced by transplant    3. Immunosuppression    4. Prophylactic antibiotic    5. Vocal cord dysfunction        Plan:   1. FEV1 decreased since. CXR is stable. Symptomatically doing very well, however he is only out 7 months and has already had an episode of rejection despite adequate immunosuppression. His most recent surveillance bronchoscopy on 2/21/18 was reported as A0 but was an inadequate sample. Will plan for bronchoscopy this week.     2. Continue with Tacrolimus, Prednisone, and Mycophenolate.  Labs reviewed. Tacrolimus dose has been increased to 2.5mg BID, recent level 10.3. Will continue to monitor and adjust as necessary.     3. Continue with Valcyte and Bactrim.    4. Left sided vocal cord dysfunction, now with abnormal finding on recent bronchoscopy. Has ENT appt this week and we will coordinate with them for our bronchoscopy.     5. Follow-up in 1 month or earlier if needed.      Yanique Salinas MD  [facility] Fellow    Attending Note:    I have seen and evaluated the patient with the fellow. Their note reflects the content of our discussion and my plan of care.      Darrick Wolfe,  MD  Pulmonary/Critical Care Medicine

## 2018-03-19 NOTE — PROGRESS NOTES
Received vtorb from Dr. Wolfe to schedule patient for a bronchoscopy due to decreased PFT's on Wednesday 3/21/18  Orders entered and submitted for scheduling.

## 2018-03-19 NOTE — TELEPHONE ENCOUNTER
----- Message from Samantha Bill DO sent at 3/19/2018  1:06 PM CDT -----  No changes to tac.  Increase mag ox to TID

## 2018-03-19 NOTE — TELEPHONE ENCOUNTER
Received written orders from Dr. Bill instructing patient to increase magnesium oxide to three times a day from 2 times a day.  [facility] message sent to patient notifying of the dose increase.  Asked patient to respond to confirm receipt of message.

## 2018-03-19 NOTE — PROGRESS NOTES
Received BG log and reviewed - majority of readings remaining within goal range. Patient usually drops logs off once a month when he comes in to see Dr. Wolfe. Recent A1c was 5.5%. Used to follow with Ce Bertrand NP who is no longer with [facility]. Patient was scheduled with [facility] NP on 2/19/18, but patient missed appointment - he states he did not know he had an appointment. No other concerns - would like to be scheduled on same day as Dr. Wolfe - no future appointment scheduled yet. Will send message to [facility] to reschedule.

## 2018-03-20 LAB
CLASS I ANTIBODIES - LUMINEX: NEGATIVE
CLASS I ANTIBODY COMMENTS - LUMINEX: NORMAL
CLASS II ANTIBODIES - LUMINEX: NORMAL
CLASS II ANTIBODY COMMENTS - LUMINEX: NORMAL
CPRA %: 0
DSA1 TESTING DATE: NORMAL
DSA12 TESTING DATE: NORMAL
DSA2 TESTING DATE: NORMAL
SERUM COLLECTION DT - LUMINEX CLASS I: NORMAL
SERUM COLLECTION DT - LUMINEX CLASS II: NORMAL

## 2018-03-21 ENCOUNTER — OFFICE VISIT (OUTPATIENT)
Dept: OTOLARYNGOLOGY | Facility: CLINIC | Age: 58
DRG: 206 | End: 2018-03-21
Payer: COMMERCIAL

## 2018-03-21 ENCOUNTER — HOSPITAL ENCOUNTER (OUTPATIENT)
Facility: HOSPITAL | Age: 58
Discharge: HOME | End: 2018-03-21
Attending: INTERNAL MEDICINE | Admitting: INTERNAL MEDICINE
Payer: COMMERCIAL

## 2018-03-21 ENCOUNTER — SURGERY (OUTPATIENT)
Age: 58
End: 2018-03-21

## 2018-03-21 VITALS
BODY MASS INDEX: 30.35 KG/M2 | OXYGEN SATURATION: 95 % | WEIGHT: 212 LBS | SYSTOLIC BLOOD PRESSURE: 117 MMHG | HEART RATE: 82 BPM | HEIGHT: 70 IN | TEMPERATURE: 97 F | DIASTOLIC BLOOD PRESSURE: 77 MMHG | RESPIRATION RATE: 19 BRPM

## 2018-03-21 VITALS
TEMPERATURE: 98 F | HEART RATE: 83 BPM | DIASTOLIC BLOOD PRESSURE: 81 MMHG | WEIGHT: 212.94 LBS | SYSTOLIC BLOOD PRESSURE: 130 MMHG | BODY MASS INDEX: 29.81 KG/M2 | HEIGHT: 71 IN

## 2018-03-21 DIAGNOSIS — T86.819 COMPLICATION OF TRANSPLANTED LUNG: Primary | ICD-10-CM

## 2018-03-21 DIAGNOSIS — T86.819 COMPLICATION OF TRANSPLANTED LUNG, UNSPECIFIED COMPLICATION: ICD-10-CM

## 2018-03-21 DIAGNOSIS — R49.0 DYSPHONIA: Primary | ICD-10-CM

## 2018-03-21 DIAGNOSIS — J38.01 UNILATERAL COMPLETE VOCAL FOLD PARALYSIS: ICD-10-CM

## 2018-03-21 LAB
APPEARANCE FLD: CLEAR
BODY FLD TYPE: NORMAL
COLOR FLD: COLORLESS
IMMUNKNOW (STIMULATED): 189 NG/ML
LYMPHOCYTES NFR FLD MANUAL: 27 %
MONOS+MACROS NFR FLD MANUAL: 71 %
NEUTROPHILS NFR FLD MANUAL: 2 %
POCT GLUCOSE: 122 MG/DL (ref 70–110)
POCT GLUCOSE: 137 MG/DL (ref 70–110)
WBC # FLD: 47 /CU MM

## 2018-03-21 PROCEDURE — 88312 SPECIAL STAINS GROUP 1: CPT | Mod: 26,,, | Performed by: PATHOLOGY

## 2018-03-21 PROCEDURE — 63600175 PHARM REV CODE 636 W HCPCS: Performed by: INTERNAL MEDICINE

## 2018-03-21 PROCEDURE — 87147 CULTURE TYPE IMMUNOLOGIC: CPT

## 2018-03-21 PROCEDURE — 82962 GLUCOSE BLOOD TEST: CPT

## 2018-03-21 PROCEDURE — 89051 BODY FLUID CELL COUNT: CPT

## 2018-03-21 PROCEDURE — 31628 BRONCHOSCOPY/LUNG BX EACH: CPT | Performed by: INTERNAL MEDICINE

## 2018-03-21 PROCEDURE — 71000015 HC POSTOP RECOV 1ST HR

## 2018-03-21 PROCEDURE — 99999 PR PBB SHADOW E&M-EST. PATIENT-LVL III: CPT | Mod: PBBFAC,,, | Performed by: OTOLARYNGOLOGY

## 2018-03-21 PROCEDURE — 87015 SPECIMEN INFECT AGNT CONCNTJ: CPT

## 2018-03-21 PROCEDURE — 87102 FUNGUS ISOLATION CULTURE: CPT

## 2018-03-21 PROCEDURE — 99153 MOD SED SAME PHYS/QHP EA: CPT | Performed by: INTERNAL MEDICINE

## 2018-03-21 PROCEDURE — 87486 CHLMYD PNEUM DNA AMP PROBE: CPT

## 2018-03-21 PROCEDURE — 27201011 HC FORCEPS DISPOSABLE: Performed by: INTERNAL MEDICINE

## 2018-03-21 PROCEDURE — 88313 SPECIAL STAINS GROUP 2: CPT | Mod: 26,,, | Performed by: PATHOLOGY

## 2018-03-21 PROCEDURE — 87496 CYTOMEG DNA AMP PROBE: CPT

## 2018-03-21 PROCEDURE — 88305 TISSUE EXAM BY PATHOLOGIST: CPT | Performed by: PATHOLOGY

## 2018-03-21 PROCEDURE — 87206 SMEAR FLUORESCENT/ACID STAI: CPT | Mod: 59

## 2018-03-21 PROCEDURE — 31623 DX BRONCHOSCOPE/BRUSH: CPT | Mod: 59,LT,, | Performed by: INTERNAL MEDICINE

## 2018-03-21 PROCEDURE — 87205 SMEAR GRAM STAIN: CPT | Mod: 59

## 2018-03-21 PROCEDURE — 87305 ASPERGILLUS AG IA: CPT | Mod: 59

## 2018-03-21 PROCEDURE — 31575 DIAGNOSTIC LARYNGOSCOPY: CPT | Mod: S$GLB,,, | Performed by: OTOLARYNGOLOGY

## 2018-03-21 PROCEDURE — 31624 DX BRONCHOSCOPE/LAVAGE: CPT | Performed by: INTERNAL MEDICINE

## 2018-03-21 PROCEDURE — 31628 BRONCHOSCOPY/LUNG BX EACH: CPT | Mod: RT,,, | Performed by: INTERNAL MEDICINE

## 2018-03-21 PROCEDURE — 99213 OFFICE O/P EST LOW 20 MIN: CPT | Mod: 25,S$GLB,, | Performed by: OTOLARYNGOLOGY

## 2018-03-21 PROCEDURE — 99152 MOD SED SAME PHYS/QHP 5/>YRS: CPT | Performed by: INTERNAL MEDICINE

## 2018-03-21 PROCEDURE — 87116 MYCOBACTERIA CULTURE: CPT

## 2018-03-21 PROCEDURE — 31624 DX BRONCHOSCOPE/LAVAGE: CPT | Mod: 51,RT,, | Performed by: INTERNAL MEDICINE

## 2018-03-21 PROCEDURE — 87070 CULTURE OTHR SPECIMN AEROBIC: CPT | Mod: 59

## 2018-03-21 PROCEDURE — 25000003 PHARM REV CODE 250: Performed by: INTERNAL MEDICINE

## 2018-03-21 PROCEDURE — 88305 TISSUE EXAM BY PATHOLOGIST: CPT | Mod: 26,,, | Performed by: PATHOLOGY

## 2018-03-21 PROCEDURE — 31623 DX BRONCHOSCOPE/BRUSH: CPT | Performed by: INTERNAL MEDICINE

## 2018-03-21 RX ORDER — LIDOCAINE HYDROCHLORIDE 10 MG/ML
INJECTION INFILTRATION; PERINEURAL CODE/TRAUMA/SEDATION MEDICATION
Status: DISCONTINUED | OUTPATIENT
Start: 2018-03-21 | End: 2018-03-21 | Stop reason: HOSPADM

## 2018-03-21 RX ORDER — MIDAZOLAM HYDROCHLORIDE 5 MG/ML
INJECTION INTRAMUSCULAR; INTRAVENOUS CODE/TRAUMA/SEDATION MEDICATION
Status: DISCONTINUED | OUTPATIENT
Start: 2018-03-21 | End: 2018-03-21 | Stop reason: HOSPADM

## 2018-03-21 RX ORDER — LIDOCAINE HYDROCHLORIDE 20 MG/ML
INJECTION, SOLUTION INFILTRATION; PERINEURAL CODE/TRAUMA/SEDATION MEDICATION
Status: DISCONTINUED | OUTPATIENT
Start: 2018-03-21 | End: 2018-03-21 | Stop reason: HOSPADM

## 2018-03-21 RX ORDER — LIDOCAINE HYDROCHLORIDE 20 MG/ML
SOLUTION OROPHARYNGEAL CODE/TRAUMA/SEDATION MEDICATION
Status: DISCONTINUED | OUTPATIENT
Start: 2018-03-21 | End: 2018-03-21 | Stop reason: HOSPADM

## 2018-03-21 RX ORDER — FENTANYL CITRATE 50 UG/ML
INJECTION, SOLUTION INTRAMUSCULAR; INTRAVENOUS CODE/TRAUMA/SEDATION MEDICATION
Status: DISCONTINUED | OUTPATIENT
Start: 2018-03-21 | End: 2018-03-21 | Stop reason: HOSPADM

## 2018-03-21 RX ADMIN — LIDOCAINE HYDROCHLORIDE 2 ML: 10 INJECTION, SOLUTION INFILTRATION; PERINEURAL at 01:03

## 2018-03-21 RX ADMIN — MIDAZOLAM 4 MG: 5 INJECTION INTRAMUSCULAR; INTRAVENOUS at 01:03

## 2018-03-21 RX ADMIN — FENTANYL CITRATE 100 MCG: 50 INJECTION, SOLUTION INTRAMUSCULAR; INTRAVENOUS at 01:03

## 2018-03-21 RX ADMIN — LIDOCAINE HYDROCHLORIDE 5 ML: 20 SOLUTION OROPHARYNGEAL at 01:03

## 2018-03-21 RX ADMIN — LIDOCAINE HYDROCHLORIDE 4 ML: 20 INJECTION, SOLUTION INFILTRATION; PERINEURAL at 01:03

## 2018-03-21 RX ADMIN — TOPICAL ANESTHETIC 0.5 ML: 200 SPRAY DENTAL; PERIODONTAL at 01:03

## 2018-03-21 RX ADMIN — LIDOCAINE HYDROCHLORIDE 8 ML: 10 INJECTION, SOLUTION INFILTRATION; PERINEURAL at 01:03

## 2018-03-21 NOTE — DISCHARGE INSTRUCTIONS
Discharge Instructions for Bronchoscopy    Chest X-ray completed and MD notified.    ACTIVITY LEVEL:  If you received sedation or an anesthetic, you may feel sleepy for several hours. Do not drive, operate machinery, make critical decisions, or perform activities that require coordination or balance until tomorrow morning. Please have a responsible person stay with you for at least two (2) hours after you leave the hospital.     DIET:  Do not eat or drink anything until ***. Once you can drink clear liquids without coughing, you can resume your regular diet.     WHAT you may expect over the next 24 hours:  · You may experience a low grade fever.  · You may cough up streaks of blood.  · Take Tylenol as directed for comfort/fever.    Additional Instructions:  · Do not take Aspirin, ibuprofen, naproxen, or any medications containing these items for *** days after the bronchoscopy.  · If you normally take Coumadin or aspirin, you may restart on ***.     COME TO THE EMERGENCY DEPARTMENT IF:  · You cough up more than one (1) tablespoon of blood.  · You have fever over 101F (38.4C) for more than one evening.  · You experience shortness of breath that is of new onset, or that is increased from your usual baseline.  · You experience chest pain.  · You have chills.    RETURN APPOINTMENT:  Follow up as directed.

## 2018-03-21 NOTE — PLAN OF CARE
Discharge instructions provided to patient.  Verbalized understanding.  VSS.  Pt will attempt to drink fluids at 1420. PT stable.  No complaints noted.  Adequate for d/c at this time. Chest x-ray to be read by Mariana after completion.

## 2018-03-21 NOTE — PLAN OF CARE
Pt states he has a top permanent bridge. No metal implants, plates, screws or rods. Pt denies hearing aids or contacts.

## 2018-03-21 NOTE — H&P (VIEW-ONLY)
LUNG TRANSPLANT RECIPIENT FOLLOW-UP     Reason for Visit: Follow-up after lung transplantation.                               Date of Transplant: 8/22/17     Reason for Transplant: Sarcoidosis with pulmonary hypertension     Type of Transplant: bilateral sequential lung     CMV Status: D+ / R-      Major Complications:   1. Left vocal cord dysfunction  2. 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 is a 57 y.o. year old male with the above listed transplant history who presents today for routine follow-up.  Since his last visit 1 month ago he has felt well however complains of intermittent cough which he relates to sinus problems. No fever/chills, no sick contacts. Has been back at work for over 1 month and feels like he is slowly getting back up to speed. He underwent surveillance bronchoscopy on 2/21/18 which was A0 however inadequate sample and culture negative. During the surveillance bronch, a medium sized partially obstructing nodule was found on the right arytenoid. He underwent injection for left vocal cord dysfunction in sept 2017 and has an appt on Wednesday to see ENT about this new finding.  He states his voice has improved a little, but has not improved to the degree he had hoped.     Review of Systems   Constitutional: Negative for chills, diaphoresis, fever, malaise/fatigue and weight loss.   HENT: Negative for congestion, ear discharge, ear pain, hearing loss, nosebleeds, sinus pain, sore throat and tinnitus.    Eyes: Negative for blurred vision, double vision, photophobia, pain, discharge and redness.   Respiratory: Negative for cough, hemoptysis, sputum production, shortness of breath, wheezing and stridor.    Cardiovascular: Negative for chest pain, palpitations, orthopnea, claudication, leg swelling and PND.   Gastrointestinal: Negative for abdominal pain, blood in stool, constipation,  "diarrhea, heartburn, melena, nausea and vomiting.   Genitourinary: Negative for dysuria, flank pain, frequency, hematuria and urgency.   Musculoskeletal: Negative for back pain, falls, joint pain, myalgias and neck pain.   Skin: Negative for itching and rash.   Neurological: Negative for dizziness, tingling, tremors, sensory change, speech change, focal weakness, seizures, loss of consciousness, weakness and headaches.   Endo/Heme/Allergies: Negative for environmental allergies and polydipsia. Does not bruise/bleed easily.   Psychiatric/Behavioral: Negative for depression, hallucinations, memory loss and substance abuse. The patient is not nervous/anxious and does not have insomnia.      BP (!) 144/84   Pulse 82   Temp 97.8 °F (36.6 °C) (Oral)   Resp 20   Ht 5' 10" (1.778 m)   Wt 96.2 kg (212 lb)   SpO2 98%   BMI 30.42 kg/m²       Physical Exam   Constitutional: He is oriented to person, place, and time and well-developed, well-nourished, and in no distress. No distress.   HENT:   Head: Normocephalic and atraumatic.   Right Ear: External ear normal.   Left Ear: External ear normal.   Nose: Nose normal.   Mouth/Throat: Oropharynx is clear and moist. No oropharyngeal exudate.   Eyes: Conjunctivae and EOM are normal. Pupils are equal, round, and reactive to light. Right eye exhibits no discharge. Left eye exhibits no discharge. No scleral icterus.   Neck: Normal range of motion. Neck supple. No JVD present. No tracheal deviation present. No thyromegaly present.   Cardiovascular: Normal rate, regular rhythm, normal heart sounds and intact distal pulses.  Exam reveals no gallop and no friction rub.    No murmur heard.  Pulmonary/Chest: Effort normal and breath sounds normal. No stridor. No respiratory distress. He has no wheezes. He has no rales. He exhibits no tenderness.   Abdominal: Soft. Bowel sounds are normal. He exhibits no distension and no mass. There is no tenderness. There is no rebound and no guarding. "   Musculoskeletal: Normal range of motion. He exhibits no edema, tenderness or deformity.   Lymphadenopathy:     He has no cervical adenopathy.   Neurological: He is alert and oriented to person, place, and time. He displays normal reflexes. No cranial nerve deficit. He exhibits normal muscle tone. Gait normal. Coordination normal. GCS score is 15.   Skin: Skin is warm and dry. No rash noted. He is not diaphoretic. No erythema. No pallor.   Psychiatric: Mood, memory, affect and judgment normal.   Nursing note and vitals reviewed.    Labs:  cbc, cmp, tacrolimus Latest Ref Rng & Units 3/7/2018 3/12/2018 3/19/2018   TACROLIMUS LVL 5.0 - 15.0 ng/mL 7.5 10.2 -   WHITE BLOOD CELL COUNT 3.90 - 12.70 K/uL - - 8.22   RBC 4.60 - 6.20 M/uL - - 4.04(L)   HEMOGLOBIN 14.0 - 18.0 g/dL - - 11.8(L)   HEMATOCRIT 40.0 - 54.0 % - - 37.5(L)   MCV 82 - 98 fL - - 93   MCH 27.0 - 31.0 pg - - 29.2   MCHC 32.0 - 36.0 g/dL - - 31.5(L)   RDW 11.5 - 14.5 % - - 11.9   PLATELETS 150 - 350 K/uL - - 406(H)   MPV 9.2 - 12.9 fL - - 8.4(L)   GRAN # 1.8 - 7.7 K/uL - - -   LYMPH # 1.0 - 4.8 K/uL - - CANCELED   MONO # 0.3 - 1.0 K/uL - - CANCELED   EOSINOPHIL% 0.0 - 8.0 % - - 0.0   BASOPHIL% 0.0 - 1.9 % - - 0.0   DIFFERENTIAL METHOD - - - Manual   SODIUM 136 - 145 mmol/L 143 144 141   POTASSIUM 3.5 - 5.1 mmol/L 4.8 4.7 4.2   CHLORIDE 95 - 110 mmol/L 104 105 103   CO2 23 - 29 mmol/L 29 29 29   GLUCOSE 70 - 110 mg/dL 114(H) 113(H) 137(H)   BUN BLD 6 - 20 mg/dL 17 16 16   CREATININE 0.5 - 1.4 mg/dL 0.8 0.8 0.9   CALCIUM 8.7 - 10.5 mg/dL 9.9 9.8 9.8   PROTEIN TOTAL 6.0 - 8.4 g/dL - - 6.5   ALBUMIN 3.5 - 5.2 g/dL - - 4.0   BILIRUBIN TOTAL 0.1 - 1.0 mg/dL - - 0.5   ALK PHOS 55 - 135 U/L - - 83   AST 10 - 40 U/L - - 18   ALT 10 - 44 U/L - - 15   ANION GAP 8 - 16 mmol/L 10 10 9   EGFR IF AFRICAN AMERICAN >60 mL/min/1.73 m:2 >60.0 >60.0 >60.0   EGFR IF NON-AFRICAN AMERICAN >60 mL/min/1.73 m:2 >60.0 >60.0 >60.0     Pulmonary Function Tests 3/19/2018 2/19/2018  1/16/2018 12/18/2017 11/16/2017 10/26/2017 10/10/2017   FVC 3.21 3.4 3.5 3.3 3 2.71 2.84   FEV1 2.61 2.79 2.83 2.69 2.53 2.41 2.57   TLC (liters) - - - - - - -   DLCO (ml/mmHg sec) - - - - - - -   FVC% 70 74 76 72 65 59 61   FEV1% 70 75 76 72 67 64 69   FEF 25-75 2.53 2.77 2.82 2.62 2.51 2.73 3.26   FEF 25-75% 67 73 74 69 65 71 85   TLC% - - - - - - -   RV - - - - - - -   RV% - - - - - - -   DLCO% - - - - - - -       Imaging:  Results for orders placed during the hospital encounter of 03/19/18   X-Ray Chest PA And Lateral    Narrative EXAMINATION:  XR CHEST PA AND LATERAL  CLINICAL HISTORY:  s/p bilateral lung transplant; Lung transplant status sarcoidosis.  TECHNIQUE:  PA and lateral views of the chest were performed.  COMPARISON:  Posttransplant chest radiographs: 02/21/2018.  02/19/2018.  10/26/2018.  08/22/2017.  Preoperative chest radiographs: 08/21/2017.  04/24/2017.  FINDINGS:  The patient is status post bilateral sequential lung transplants.  Sternal sutures are intact and aligned.  There is a small amount of dependent left pleural fluid obscuring the left lateral and posterior costophrenic angles.  I detect no right pleural fluid.  Mediastinal structures are midline. Lateral margin of the descending thoracic aorta is well defined on PA view.  These findings are similar to earlier studies.  On lateral view there is a large lucent region, at least 8.5 cm) projected over the posterior and inferior aspect of the left hemithorax; this region overlies the lower thoracic spine and abuts the superior margin of the subpulmonic left pleural fluid.  Similar findings are present on earlier postoperative lateral radiographs including 01/16/2018, 10/26/2017, 09/25/2017 and perhaps 08/27/201.  It may represent air trapping in a portion of the left lower lobe or partial atelectasis of the left lower lobe with over expansion of the posterior aspect of the apical posterior segment of the left upper lobe and less likely over  expansion of the superior segment of the lingula.  Chest CT would provide more sensitive evaluation of pulmonary parenchyma and airways.  I detect no new pulmonary abnormality and no right pleural fluid, pneumothorax, pneumomediastinum, pneumoperitoneum or significant osseous abnormality.    Impression Lucent region seen on lateral view overlying the lower thoracic spine and abutting the left subpulmonic pleural fluid is of uncertain significance.  Chest CT might clarify this finding.  Radiographic appearance is stable compared to multiple prior studies dating back to at least 09/25/2017.  No new disease identified.  This report was flagged in Epic as abnormal.  Electronically signed by: Sena Baum MD  Date:    03/19/2018  Time:    08:14       Assessment:  1. Encounter following organ transplant    2. Lung replaced by transplant    3. Immunosuppression    4. Prophylactic antibiotic    5. Vocal cord dysfunction        Plan:   1. FEV1 decreased since. CXR is stable. Symptomatically doing very well, however he is only out 7 months and has already had an episode of rejection despite adequate immunosuppression. His most recent surveillance bronchoscopy on 2/21/18 was reported as A0 but was an inadequate sample. Will plan for bronchoscopy this week.     2. Continue with Tacrolimus, Prednisone, and Mycophenolate.  Labs reviewed. Tacrolimus dose has been increased to 2.5mg BID, recent level 10.3. Will continue to monitor and adjust as necessary.     3. Continue with Valcyte and Bactrim.    4. Left sided vocal cord dysfunction, now with abnormal finding on recent bronchoscopy. Has ENT appt this week and we will coordinate with them for our bronchoscopy.     5. Follow-up in 1 month or earlier if needed.      Yanique Salinas MD  [facility] Fellow    Attending Note:    I have seen and evaluated the patient with the fellow. Their note reflects the content of our discussion and my plan of care.      Darrick Wolfe,  MD  Pulmonary/Critical Care Medicine

## 2018-03-21 NOTE — DISCHARGE SUMMARY
[facility]  Lung Transplant  Discharge Summary      Patient Name: Martin Hendrix Jr.  MRN: [id number]  Admission Date: 3/21/2018  Hospital Length of Stay: 0 days  Discharge Date and Time: 03/21/2018 1:28 PM  Attending Physician: Darrick Wolfe MD   Discharging Provider: Samantha Bill DO  Primary Care Provider: Sukhi Dunham Jr, MD     HPI: No notes on file    Procedure(s) (LRB):  flexible bronchoscopy with tissue biopsy (N/A)     Hospital Course: Underwent successful surveillance bronchoscopy without complications.        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  Qty: 90 tablet, Refills: 3    Associated Diagnoses: Essential hypertension; Lung replaced by transplant      aspirin (ECOTRIN) 81 MG EC tablet Take 1 tablet (81 mg total) by mouth once daily.  Qty: 90 tablet, Refills: 3    Associated Diagnoses: Lung replaced by transplant      calcium-vitamin D tablet 600 mg-200 units Take 1 tablet by mouth 2 (two) times daily.  Qty: 180 tablet, Refills: 3    Associated Diagnoses: Lung replaced by transplant      famotidine (PEPCID) 20 MG tablet Take 1 tablet (20 mg total) by mouth 2 (two) times daily.  Qty: 180 tablet, Refills: 3    Associated Diagnoses: Lung replaced by transplant      fluticasone (FLONASE) 50 mcg/actuation  nasal spray 1 spray by Each Nare route once daily.  Qty: 1 Bottle, Refills: 3    Associated Diagnoses: Sinusitis, unspecified chronicity, unspecified location      folic acid (FOLVITE) 1 MG tablet Take 1 tablet (1 mg total) by mouth once daily.  Qty: 90 tablet, Refills: 3    Associated Diagnoses: Lung replaced by transplant      guaifenesin (MUCINEX) 600 mg 12 hr tablet Take 1 tablet (600 mg total) by mouth 2 (two) times daily.  Qty: 60 tablet, Refills: 11      insulin lispro (HUMALOG KWIKPEN) 100 unit/mL InPn pen 12 units with meals plus correction scale, max TDD 60 units daily  Qty: 1 Box, Refills: 3    Associated Diagnoses: Type 2 diabetes mellitus with hyperglycemia, with long-term current use of insulin      magnesium oxide (MAG-OX) 400 mg tablet Take 1 tablet (400 mg total) by mouth 3 (three) times daily.  Qty: 270 tablet, Refills: 3    Associated Diagnoses: Lung replaced by transplant; Hypomagnesemia      metFORMIN (GLUCOPHAGE-XR) 500 MG 24 hr tablet Take 2 tablets (1,000 mg total) by mouth 2 (two) times daily with meals.  Qty: 120 tablet, Refills: 11      multivitamin (THERAGRAN) per tablet Take 1 tablet by mouth once daily.      mycophenolate (CELLCEPT) 250 mg Cap Take 6 capsules (1,500 mg total) by mouth 2 (two) times daily. Dose increase.  Qty: 360 capsule, Refills: 11    Associated Diagnoses: Lung replaced by transplant      pravastatin (PRAVACHOL) 20 MG tablet Take 1 tablet (20 mg total) by mouth once daily.  Qty: 90 tablet, Refills: 3      predniSONE (DELTASONE) 10 MG tablet Take 5 mg by mouth once daily.      sulfamethoxazole-trimethoprim 800-160mg (BACTRIM DS) 800-160 mg Tab Take 1 tablet by mouth every Mon, Wed, Fri.  Qty: 45 tablet, Refills: 3    Associated Diagnoses: Lung replaced by transplant      tacrolimus (PROGRAF) 0.5 MG Cap Take 5 capsules (2.5 mg) by mouth every morning and 4 capsules (2 mg) by mouth every evening.  Qty: 270 capsule, Refills: 11    Associated Diagnoses: Lung replaced by  "transplant      valGANciclovir (VALCYTE) 450 mg Tab Take 1 tablet (450 mg total) by mouth 2 (two) times daily.  Qty: 180 tablet, Refills: 3    Associated Diagnoses: Lung replaced by transplant      blood sugar diagnostic Strp To check BG 4 times daily, to use with insurance preferred meter (one touch verio if possible)  Qty: 350 strip, Refills: 3    Associated Diagnoses: Type 2 diabetes mellitus with hyperglycemia, with long-term current use of insulin      furosemide (LASIX) 40 MG tablet Take 1 tablet (40 mg total) by mouth once daily.  Qty: 30 tablet, Refills: 11    Associated Diagnoses: Edema of both legs      insulin degludec (TRESIBA FLEXTOUCH U-200) 200 unit/mL (3 mL) InPn Inject 18 Units into the skin every evening.  Qty: 3 Syringe, Refills: 3    Associated Diagnoses: Type 2 diabetes mellitus with hyperglycemia, with long-term current use of insulin      lancets Misc To check BG 4 times daily, to use with insurance preferred meter (one touch verio if possible)  Qty: 350 each, Refills: 3    Associated Diagnoses: Type 2 diabetes mellitus with hyperglycemia, with long-term current use of insulin      oxycodone-acetaminophen (PERCOCET) 7.5-325 mg per tablet Take 1 tablet by mouth every 4 (four) hours as needed.  Qty: 40 tablet, Refills: 0      pen needle, diabetic (BD ULTRA-FINE JIM PEN NEEDLES) 32 gauge x 5/32" Ndle Uses 4 times daily, on multiple daily insulin injections  Qty: 350 each, Refills: 3    Associated Diagnoses: Type 2 diabetes mellitus with hyperglycemia, with long-term current use of insulin         STOP taking these medications       ACCU-CHEK DEANNE PLUS METER Misc Comments:   Reason for Stopping:             Patient Instructions:     Diet general     Activity as tolerated     Call MD for:  temperature >101     Call MD for:  coughing up blood greater than 3 tablespoons in volume     Call MD for:  chest pain     Call MD for:  difficulty breathing or shortness of breath     Call MD for:  development " of yellow/green sputum       Follow Up:  Follow-up Information     Darrick Wolfe MD.    Specialty:  Transplant  Why:  As needed  Contact information:  [address]  [facility] [id number] [phone]                   Samantha Bill, DO  Lung Transplant  [facility]

## 2018-03-22 ENCOUNTER — TELEPHONE (OUTPATIENT)
Dept: TRANSPLANT | Facility: CLINIC | Age: 58
End: 2018-03-22

## 2018-03-22 LAB
FUNGUS SPEC CULT: NORMAL
FUNGUS SPEC CULT: NORMAL
GALACTOMANNAN AG SPEC-ACNC: <0.5 INDEX

## 2018-03-22 NOTE — TELEPHONE ENCOUNTER
Received written orders from Dr. Wolfe to direct admit on 3/23/18 for acute A2 rejection treatment.  Pt contacted and notified of the above.  Informed pt that he will get a call in the morning once the admit is in and will let him know around what time he can come to the hospital.  Patient verbalized understanding.

## 2018-03-23 ENCOUNTER — HOSPITAL ENCOUNTER (INPATIENT)
Facility: HOSPITAL | Age: 58
LOS: 3 days | Discharge: HOME | DRG: 206 | End: 2018-03-26
Attending: INTERNAL MEDICINE | Admitting: INTERNAL MEDICINE
Payer: COMMERCIAL

## 2018-03-23 DIAGNOSIS — T86.810 ACUTE REJECTION OF LUNG TRANSPLANT: Primary | ICD-10-CM

## 2018-03-23 DIAGNOSIS — Z94.2 LUNG TRANSPLANTED: ICD-10-CM

## 2018-03-23 DIAGNOSIS — Z94.2 LUNG REPLACED BY TRANSPLANT: ICD-10-CM

## 2018-03-23 DIAGNOSIS — Z79.2 PROPHYLACTIC ANTIBIOTIC: ICD-10-CM

## 2018-03-23 DIAGNOSIS — T38.0X5A ADVERSE EFFECT OF CORTICOSTEROIDS, INITIAL ENCOUNTER: ICD-10-CM

## 2018-03-23 DIAGNOSIS — R60.0 EDEMA OF BOTH LEGS: ICD-10-CM

## 2018-03-23 DIAGNOSIS — D84.9 IMMUNOSUPPRESSION: ICD-10-CM

## 2018-03-23 DIAGNOSIS — E66.9 CLASS 1 OBESITY WITH BODY MASS INDEX (BMI) OF 30.0 TO 30.9 IN ADULT, UNSPECIFIED OBESITY TYPE, UNSPECIFIED WHETHER SERIOUS COMORBIDITY PRESENT: ICD-10-CM

## 2018-03-23 LAB
ALBUMIN SERPL BCP-MCNC: 4.2 G/DL
ALP SERPL-CCNC: 81 U/L
ALT SERPL W/O P-5'-P-CCNC: 16 U/L
ANION GAP SERPL CALC-SCNC: 8 MMOL/L
ANISOCYTOSIS BLD QL SMEAR: SLIGHT
AST SERPL-CCNC: 19 U/L
BACTERIA SPEC AEROBE CULT: NORMAL
BASOPHILS NFR BLD: 1 %
BILIRUB SERPL-MCNC: 0.6 MG/DL
BUN SERPL-MCNC: 18 MG/DL
CALCIUM SERPL-MCNC: 9.2 MG/DL
CHLORIDE SERPL-SCNC: 104 MMOL/L
CMV DNA SPEC QL NAA+PROBE: NOT DETECTED
CO2 SERPL-SCNC: 26 MMOL/L
CREAT SERPL-MCNC: 0.8 MG/DL
DIFFERENTIAL METHOD: ABNORMAL
ENTEROVIRUS: NOT DETECTED
EOSINOPHIL NFR BLD: 4 %
ERYTHROCYTE [DISTWIDTH] IN BLOOD BY AUTOMATED COUNT: 12.1 %
EST. GFR  (AFRICAN AMERICAN): >60 ML/MIN/1.73 M^2
EST. GFR  (NON AFRICAN AMERICAN): >60 ML/MIN/1.73 M^2
GLUCOSE SERPL-MCNC: 161 MG/DL
GRAM STN SPEC: NORMAL
HCT VFR BLD AUTO: 35.4 %
HGB BLD-MCNC: 11.2 G/DL
HUMAN BOCAVIRUS: NOT DETECTED
HUMAN CORONAVIRUS, COMMON COLD VIRUS: NOT DETECTED
IMM GRANULOCYTES # BLD AUTO: ABNORMAL K/UL
IMM GRANULOCYTES NFR BLD AUTO: ABNORMAL %
INFLUENZA A - H1N1-09: NOT DETECTED
LEGIONELLA PNEUMOPHILA: NOT DETECTED
LYMPHOCYTES NFR BLD: 6 %
MCH RBC QN AUTO: 29.8 PG
MCHC RBC AUTO-ENTMCNC: 31.6 G/DL
MCV RBC AUTO: 94 FL
MONOCYTES NFR BLD: 1 %
MORAXELLA CATARRHALIS: NOT DETECTED
NEUTROPHILS NFR BLD: 84 %
NEUTS BAND NFR BLD MANUAL: 4 %
NRBC BLD-RTO: 0 /100 WBC
OVALOCYTES BLD QL SMEAR: ABNORMAL
PARAINFLUENZA: NOT DETECTED
PLATELET # BLD AUTO: 400 K/UL
PLATELET BLD QL SMEAR: ABNORMAL
PMV BLD AUTO: 8.7 FL
POIKILOCYTOSIS BLD QL SMEAR: SLIGHT
POTASSIUM SERPL-SCNC: 4.4 MMOL/L
PROT SERPL-MCNC: 6.9 G/DL
RBC # BLD AUTO: 3.76 M/UL
RVP - ADENOVIRUS: NOT DETECTED
RVP - HUMAN METAPNEUMOVIRUS (HMPV): NOT DETECTED
RVP - INFLUENZA A: NOT DETECTED
RVP - INFLUENZA B: NOT DETECTED
RVP - RESPIRATORY SYNCTIAL VIRUS (RSV) A: NOT DETECTED
RVP - RESPIRATORY VIRAL PANEL, SOURCE: NORMAL
RVP - RHINOVIRUS: NOT DETECTED
SODIUM SERPL-SCNC: 138 MMOL/L
SPECIMEN SOURCE: NORMAL
TEM - ACINETOBACTER BAUMANNII: NOT DETECTED
TEM - BORDETELLA PERTUSSIS: NOT DETECTED
TEM - CHLAMYDOPHILA PNEUMONIAE: NOT DETECTED
TEM - KLEBSIELLA PNEUMONIAE: NOT DETECTED
TEM - MRSA: NOT DETECTED
TEM - MYCOPLASMA PNEUMONIAE: NOT DETECTED
TEM - NEISSERIA MENINGITIDIS: NOT DETECTED
TEM - PANTON-VALENTINE: NOT DETECTED
TEM - PSEUDOMONAS AERUGINOSA: NOT DETECTED
TEM - STAPHYLOCOCCUS AUREUS: NOT DETECTED
TEM - STREPTOCOCCUS PNEUMONIAE: NOT DETECTED
TEM - STREPTOCOCCUS PYOGENES A: NOT DETECTED
TEM- HAEMOPHILUS INFLUENZAE B: NOT DETECTED
TEM- HAEMOPHILUS INFLUENZAE: NOT DETECTED
WBC # BLD AUTO: 8.36 K/UL

## 2018-03-23 PROCEDURE — 25000003 PHARM REV CODE 250: Performed by: INTERNAL MEDICINE

## 2018-03-23 PROCEDURE — 80053 COMPREHEN METABOLIC PANEL: CPT

## 2018-03-23 PROCEDURE — 25000003 PHARM REV CODE 250: Performed by: PHYSICIAN ASSISTANT

## 2018-03-23 PROCEDURE — 85027 COMPLETE CBC AUTOMATED: CPT

## 2018-03-23 PROCEDURE — 85007 BL SMEAR W/DIFF WBC COUNT: CPT

## 2018-03-23 PROCEDURE — 20600001 HC STEP DOWN PRIVATE ROOM

## 2018-03-23 PROCEDURE — 63600175 PHARM REV CODE 636 W HCPCS: Performed by: PHYSICIAN ASSISTANT

## 2018-03-23 PROCEDURE — 36415 COLL VENOUS BLD VENIPUNCTURE: CPT

## 2018-03-23 RX ORDER — ACETAMINOPHEN 325 MG/1
650 TABLET ORAL DAILY PRN
Status: DISCONTINUED | OUTPATIENT
Start: 2018-03-23 | End: 2018-03-26 | Stop reason: HOSPADM

## 2018-03-23 RX ORDER — ONDANSETRON 4 MG/1
8 TABLET, FILM COATED ORAL DAILY PRN
Status: DISCONTINUED | OUTPATIENT
Start: 2018-03-23 | End: 2018-03-26 | Stop reason: HOSPADM

## 2018-03-23 RX ORDER — TACROLIMUS 1 MG/1
2 CAPSULE ORAL 2 TIMES DAILY
Status: DISCONTINUED | OUTPATIENT
Start: 2018-03-23 | End: 2018-03-25

## 2018-03-23 RX ORDER — TACROLIMUS 0.5 MG/1
0.5 CAPSULE ORAL EVERY MORNING
Status: DISCONTINUED | OUTPATIENT
Start: 2018-03-24 | End: 2018-03-25

## 2018-03-23 RX ORDER — VALGANCICLOVIR 450 MG/1
450 TABLET, FILM COATED ORAL 2 TIMES DAILY
Status: DISCONTINUED | OUTPATIENT
Start: 2018-03-23 | End: 2018-03-26 | Stop reason: HOSPADM

## 2018-03-23 RX ORDER — METHYLPREDNISOLONE SOD SUCC 125 MG
125 VIAL (EA) INJECTION DAILY PRN
Status: DISCONTINUED | OUTPATIENT
Start: 2018-03-23 | End: 2018-03-23

## 2018-03-23 RX ORDER — FAMOTIDINE 20 MG/1
20 TABLET, FILM COATED ORAL 2 TIMES DAILY
Status: DISCONTINUED | OUTPATIENT
Start: 2018-03-23 | End: 2018-03-26 | Stop reason: HOSPADM

## 2018-03-23 RX ORDER — FLUTICASONE PROPIONATE 50 MCG
1 SPRAY, SUSPENSION (ML) NASAL DAILY
Status: DISCONTINUED | OUTPATIENT
Start: 2018-03-24 | End: 2018-03-26 | Stop reason: HOSPADM

## 2018-03-23 RX ORDER — AMLODIPINE BESYLATE 5 MG/1
5 TABLET ORAL DAILY
Status: DISCONTINUED | OUTPATIENT
Start: 2018-03-24 | End: 2018-03-26 | Stop reason: HOSPADM

## 2018-03-23 RX ORDER — PREDNISONE 5 MG/1
5 TABLET ORAL DAILY
Status: DISCONTINUED | OUTPATIENT
Start: 2018-03-24 | End: 2018-03-26 | Stop reason: HOSPADM

## 2018-03-23 RX ORDER — FUROSEMIDE 40 MG/1
40 TABLET ORAL DAILY PRN
Status: DISCONTINUED | OUTPATIENT
Start: 2018-03-23 | End: 2018-03-26 | Stop reason: HOSPADM

## 2018-03-23 RX ORDER — PRAVASTATIN SODIUM 20 MG/1
20 TABLET ORAL DAILY
Status: DISCONTINUED | OUTPATIENT
Start: 2018-03-24 | End: 2018-03-26 | Stop reason: HOSPADM

## 2018-03-23 RX ORDER — SULFAMETHOXAZOLE AND TRIMETHOPRIM 800; 160 MG/1; MG/1
1 TABLET ORAL
Status: DISCONTINUED | OUTPATIENT
Start: 2018-03-26 | End: 2018-03-26 | Stop reason: HOSPADM

## 2018-03-23 RX ORDER — EPINEPHRINE 1 MG/ML
0.5 INJECTION, SOLUTION INTRACARDIAC; INTRAMUSCULAR; INTRAVENOUS; SUBCUTANEOUS ONCE AS NEEDED
Status: ACTIVE | OUTPATIENT
Start: 2018-03-23 | End: 2018-03-23

## 2018-03-23 RX ORDER — LANOLIN ALCOHOL/MO/W.PET/CERES
400 CREAM (GRAM) TOPICAL 3 TIMES DAILY
Status: DISCONTINUED | OUTPATIENT
Start: 2018-03-23 | End: 2018-03-26 | Stop reason: HOSPADM

## 2018-03-23 RX ORDER — ASPIRIN 81 MG/1
81 TABLET ORAL DAILY
Status: DISCONTINUED | OUTPATIENT
Start: 2018-03-24 | End: 2018-03-26 | Stop reason: HOSPADM

## 2018-03-23 RX ORDER — POTASSIUM CHLORIDE 20 MEQ/15ML
60 SOLUTION ORAL
Status: DISCONTINUED | OUTPATIENT
Start: 2018-03-23 | End: 2018-03-26 | Stop reason: HOSPADM

## 2018-03-23 RX ORDER — POTASSIUM CHLORIDE 20 MEQ/15ML
40 SOLUTION ORAL
Status: DISCONTINUED | OUTPATIENT
Start: 2018-03-23 | End: 2018-03-26 | Stop reason: HOSPADM

## 2018-03-23 RX ORDER — DIPHENHYDRAMINE HCL 50 MG
50 CAPSULE ORAL DAILY PRN
Status: DISCONTINUED | OUTPATIENT
Start: 2018-03-23 | End: 2018-03-26 | Stop reason: HOSPADM

## 2018-03-23 RX ORDER — MYCOPHENOLATE MOFETIL 250 MG/1
1500 CAPSULE ORAL 2 TIMES DAILY
Status: DISCONTINUED | OUTPATIENT
Start: 2018-03-23 | End: 2018-03-25

## 2018-03-23 RX ADMIN — MYCOPHENOLATE MOFETIL 1500 MG: 250 CAPSULE ORAL at 08:03

## 2018-03-23 RX ADMIN — HEPARIN SODIUM 150 MG: 1000 INJECTION, SOLUTION INTRAVENOUS; SUBCUTANEOUS at 11:03

## 2018-03-23 RX ADMIN — FAMOTIDINE 20 MG: 20 TABLET, FILM COATED ORAL at 08:03

## 2018-03-23 RX ADMIN — ACETAMINOPHEN 650 MG: 325 TABLET ORAL at 10:03

## 2018-03-23 RX ADMIN — DIPHENHYDRAMINE HYDROCHLORIDE 50 MG: 50 CAPSULE ORAL at 10:03

## 2018-03-23 RX ADMIN — MAGNESIUM OXIDE TAB 400 MG (241.3 MG ELEMENTAL MG) 400 MG: 400 (241.3 MG) TAB at 08:03

## 2018-03-23 RX ADMIN — VALGANCICLOVIR 450 MG: 450 TABLET, FILM COATED ORAL at 08:03

## 2018-03-23 RX ADMIN — TACROLIMUS 2 MG: 1 CAPSULE ORAL at 08:03

## 2018-03-23 NOTE — PROGRESS NOTES
[facility]  Department of Otorhinolaryngology and Communication Sciences    Subjective:      Martin Hendrix Jr. is a 57 y.o. male who presents for follow-up. He has  left vocal fold paralysis following lung transplant 8/2017.    TREATMENT HISTORY:  9/29/2017 - Left VF injection augmentation - HA    Since the injection, he reports durable improvements in his voice. He continues to note improved strength and projection, though still with some mild limitations. He does not feel he has experienced any deterioration. He remains pleased with his progress. Dr. Wolfe had recently noted a questionable laryngeal abnormality on recent surveillance bronchoscopy and requested I re-evaluate him.    The patient's medications, allergies, past medical, surgical, social and family histories were reviewed and updated as appropriate.    A detailed review of systems was obtained with pertinent positives as per the above HPI, and otherwise negative.      Objective:     VS reviewed, per RN/MA notes    Constitutional: comfortable, well dressed  Psychiatric: appropriate affect  Respiratory: comfortably breathing, symmetric chest rise, no stridor  Voice: mild inconsistent breathiness and asthenia; improved resonance at higher pitches  Head: normocephalic  Eyes: conjunctivae and sclerae clear  Indirect laryngoscopy is limited due to gag    Procedure  Flexible Laryngoscopy (00535): Laryngoscopy is indicated for assessment of upper aerodigestive structure and function. This was carried out transnasally with a distal chip videoendoscope. After verbal consent was obtained, the patient was positioned and the nose was topically decongested with 1% phenylephrine and topically anesthetized with 4% lidocaine. The endoscope was passed through the most patent nasal cavity and positioned to image the nasopharynx, larynx, and hypopharynx in detail. The following features were examined: nasopharyngeal, laryngeal, hypopharyngeal masses;  velopharyngeal strength, closure, and symmetry of motion; vocal fold range and symmetry of motion; laryngeal mucosal edema, erythema, inflammation, and hydration; salivary pooling; and gross laryngeal sensation. The equipment was removed. The patient tolerated the procedure well without complication. All findings were normal except:  - left vocal fold immobile, paramedian; stable improvement in contour/support  - complete glottal closure   - negligible posterior gap; mild vertical height mismatch  - compensatory supraglottic hyperfunction  - no masses or mucosal abnormalities      Assessment:     Martin Hendrix Jr. is a 57 y.o. male with  left vocal fold paralysis following lung transplant 8/2017. He has made durable progress following injection augmentation.    I note no endolaryngeal masses or mucosal irregularities.     Plan:     Reassurance was provided. I recommend no further intervention at present. He will follow up with me in 6 months, or sooner if needed.    All questions were answered, and the patient is in agreement with the plan.    [place] M.D.  [facility]  Department of Otorhinolaryngology and Communication Sciences

## 2018-03-24 LAB
ANISOCYTOSIS BLD QL SMEAR: SLIGHT
BASOPHILS # BLD AUTO: 0.04 K/UL
BASOPHILS NFR BLD: 0.3 %
DIFFERENTIAL METHOD: ABNORMAL
EOSINOPHIL # BLD AUTO: 0 K/UL
EOSINOPHIL NFR BLD: 0.1 %
ERYTHROCYTE [DISTWIDTH] IN BLOOD BY AUTOMATED COUNT: 12.4 %
HCT VFR BLD AUTO: 32 %
HGB BLD-MCNC: 10.3 G/DL
HYPOCHROMIA BLD QL SMEAR: ABNORMAL
IMM GRANULOCYTES # BLD AUTO: 0.52 K/UL
IMM GRANULOCYTES NFR BLD AUTO: 4.1 %
LYMPHOCYTES # BLD AUTO: 0.1 K/UL
LYMPHOCYTES NFR BLD: 0.5 %
MAGNESIUM SERPL-MCNC: 1 MG/DL
MCH RBC QN AUTO: 29.2 PG
MCHC RBC AUTO-ENTMCNC: 32.2 G/DL
MCV RBC AUTO: 91 FL
MONOCYTES # BLD AUTO: 0.1 K/UL
MONOCYTES NFR BLD: 0.6 %
NEUTROPHILS # BLD AUTO: 12 K/UL
NEUTROPHILS NFR BLD: 94.4 %
NRBC BLD-RTO: 0 /100 WBC
PLATELET # BLD AUTO: 280 K/UL
PLATELET BLD QL SMEAR: ABNORMAL
PMV BLD AUTO: 8.5 FL
POCT GLUCOSE: 136 MG/DL (ref 70–110)
POCT GLUCOSE: 148 MG/DL (ref 70–110)
POCT GLUCOSE: 182 MG/DL (ref 70–110)
POCT GLUCOSE: 200 MG/DL (ref 70–110)
RBC # BLD AUTO: 3.53 M/UL
TACROLIMUS BLD-MCNC: 10.4 NG/ML
WBC # BLD AUTO: 12.74 K/UL

## 2018-03-24 PROCEDURE — 25000242 PHARM REV CODE 250 ALT 637 W/ HCPCS: Performed by: PHYSICIAN ASSISTANT

## 2018-03-24 PROCEDURE — 63600175 PHARM REV CODE 636 W HCPCS: Performed by: INTERNAL MEDICINE

## 2018-03-24 PROCEDURE — 99232 SBSQ HOSP IP/OBS MODERATE 35: CPT | Mod: ,,, | Performed by: INTERNAL MEDICINE

## 2018-03-24 PROCEDURE — 85025 COMPLETE CBC W/AUTO DIFF WBC: CPT

## 2018-03-24 PROCEDURE — 63600175 PHARM REV CODE 636 W HCPCS: Performed by: PHYSICIAN ASSISTANT

## 2018-03-24 PROCEDURE — 20600001 HC STEP DOWN PRIVATE ROOM

## 2018-03-24 PROCEDURE — 25000003 PHARM REV CODE 250: Performed by: PHYSICIAN ASSISTANT

## 2018-03-24 PROCEDURE — 99223 1ST HOSP IP/OBS HIGH 75: CPT | Mod: AI,,, | Performed by: INTERNAL MEDICINE

## 2018-03-24 PROCEDURE — 25000003 PHARM REV CODE 250: Performed by: INTERNAL MEDICINE

## 2018-03-24 PROCEDURE — 83735 ASSAY OF MAGNESIUM: CPT

## 2018-03-24 PROCEDURE — 36415 COLL VENOUS BLD VENIPUNCTURE: CPT

## 2018-03-24 PROCEDURE — 80197 ASSAY OF TACROLIMUS: CPT

## 2018-03-24 RX ORDER — INSULIN ASPART 100 [IU]/ML
10 INJECTION, SOLUTION INTRAVENOUS; SUBCUTANEOUS
Status: DISCONTINUED | OUTPATIENT
Start: 2018-03-24 | End: 2018-03-26 | Stop reason: HOSPADM

## 2018-03-24 RX ORDER — INSULIN ASPART 100 [IU]/ML
1-10 INJECTION, SOLUTION INTRAVENOUS; SUBCUTANEOUS
Status: DISCONTINUED | OUTPATIENT
Start: 2018-03-24 | End: 2018-03-26 | Stop reason: HOSPADM

## 2018-03-24 RX ORDER — IBUPROFEN 200 MG
24 TABLET ORAL
Status: DISCONTINUED | OUTPATIENT
Start: 2018-03-24 | End: 2018-03-26 | Stop reason: HOSPADM

## 2018-03-24 RX ORDER — IBUPROFEN 200 MG
16 TABLET ORAL
Status: DISCONTINUED | OUTPATIENT
Start: 2018-03-24 | End: 2018-03-26 | Stop reason: HOSPADM

## 2018-03-24 RX ORDER — GLUCAGON 1 MG
1 KIT INJECTION
Status: DISCONTINUED | OUTPATIENT
Start: 2018-03-24 | End: 2018-03-26 | Stop reason: HOSPADM

## 2018-03-24 RX ADMIN — MYCOPHENOLATE MOFETIL 1500 MG: 250 CAPSULE ORAL at 09:03

## 2018-03-24 RX ADMIN — MAGNESIUM OXIDE TAB 400 MG (241.3 MG ELEMENTAL MG) 400 MG: 400 (241.3 MG) TAB at 09:03

## 2018-03-24 RX ADMIN — ACETAMINOPHEN 650 MG: 325 TABLET ORAL at 10:03

## 2018-03-24 RX ADMIN — VALGANCICLOVIR 450 MG: 450 TABLET, FILM COATED ORAL at 09:03

## 2018-03-24 RX ADMIN — ASPIRIN 81 MG: 81 TABLET, COATED ORAL at 09:03

## 2018-03-24 RX ADMIN — MAGNESIUM OXIDE TAB 400 MG (241.3 MG ELEMENTAL MG) 400 MG: 400 (241.3 MG) TAB at 10:03

## 2018-03-24 RX ADMIN — INSULIN DETEMIR 15 UNITS: 100 INJECTION, SOLUTION SUBCUTANEOUS at 11:03

## 2018-03-24 RX ADMIN — VALGANCICLOVIR 450 MG: 450 TABLET, FILM COATED ORAL at 08:03

## 2018-03-24 RX ADMIN — ACETAMINOPHEN 650 MG: 325 TABLET ORAL at 04:03

## 2018-03-24 RX ADMIN — HEPARIN SODIUM 150 MG: 1000 INJECTION, SOLUTION INTRAVENOUS; SUBCUTANEOUS at 10:03

## 2018-03-24 RX ADMIN — INSULIN ASPART 10 UNITS: 100 INJECTION, SOLUTION INTRAVENOUS; SUBCUTANEOUS at 06:03

## 2018-03-24 RX ADMIN — MYCOPHENOLATE MOFETIL 1500 MG: 250 CAPSULE ORAL at 08:03

## 2018-03-24 RX ADMIN — FAMOTIDINE 20 MG: 20 TABLET, FILM COATED ORAL at 09:03

## 2018-03-24 RX ADMIN — AMLODIPINE BESYLATE 5 MG: 5 TABLET ORAL at 09:03

## 2018-03-24 RX ADMIN — PRAVASTATIN SODIUM 20 MG: 20 TABLET ORAL at 09:03

## 2018-03-24 RX ADMIN — INSULIN ASPART 10 UNITS: 100 INJECTION, SOLUTION INTRAVENOUS; SUBCUTANEOUS at 01:03

## 2018-03-24 RX ADMIN — DIPHENHYDRAMINE HYDROCHLORIDE 50 MG: 50 CAPSULE ORAL at 10:03

## 2018-03-24 RX ADMIN — TACROLIMUS 2 MG: 1 CAPSULE ORAL at 09:03

## 2018-03-24 RX ADMIN — TACROLIMUS 2 MG: 1 CAPSULE ORAL at 06:03

## 2018-03-24 RX ADMIN — FLUTICASONE PROPIONATE 50 MCG: 50 SPRAY, METERED NASAL at 11:03

## 2018-03-24 RX ADMIN — INSULIN ASPART 1 UNITS: 100 INJECTION, SOLUTION INTRAVENOUS; SUBCUTANEOUS at 10:03

## 2018-03-24 RX ADMIN — TACROLIMUS 0.5 MG: 0.5 CAPSULE ORAL at 09:03

## 2018-03-24 RX ADMIN — ONDANSETRON HYDROCHLORIDE 8 MG: 4 TABLET, FILM COATED ORAL at 10:03

## 2018-03-24 RX ADMIN — INSULIN ASPART 2 UNITS: 100 INJECTION, SOLUTION INTRAVENOUS; SUBCUTANEOUS at 06:03

## 2018-03-24 RX ADMIN — PREDNISONE 5 MG: 5 TABLET ORAL at 09:03

## 2018-03-24 RX ADMIN — MAGNESIUM OXIDE TAB 400 MG (241.3 MG ELEMENTAL MG) 400 MG: 400 (241.3 MG) TAB at 04:03

## 2018-03-24 RX ADMIN — ONDANSETRON HYDROCHLORIDE 8 MG: 4 TABLET, FILM COATED ORAL at 04:03

## 2018-03-24 RX ADMIN — FAMOTIDINE 20 MG: 20 TABLET, FILM COATED ORAL at 08:03

## 2018-03-24 RX ADMIN — MAGNESIUM SULFATE HEPTAHYDRATE 1 G: 500 INJECTION, SOLUTION INTRAMUSCULAR; INTRAVENOUS at 08:03

## 2018-03-24 NOTE — HPI
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  He is CMV D+/R-.  His course has been complicated by left vocal cord dysfunction and A2 rejection x2 starting on 10/2017.  He is admitted today for treatment of recurrent A2 rejection despite adequate immunosuppression.        Today, he has no complaints.  No recent cough, productive sputum, or sick contacts.  Denies any dyspnea an overall feels very well.  He takes all medications at home as directed without side effects.  He was found to have A2 rejection on 10/4/2017.  He received 3 days of pulse steroids as an outpatient.  He then had a repeat bronchoscopy on 11/2/2017 and was found to have continued presence of A2 rejection.  At that time he was admitted for pulse steroids and abx (had difficulty as an outpatient with blood glucose levels with the steroids).  On 11/22/2017, had repeat bronchoscopy and now had A1 rejection.  Outpatient immunosuppression was optimized.  Bronchoscopy on 12/20/2017 showed no rejection.  He had his most recent bronchoscopy last week which showed recurrence of A2 rejection.

## 2018-03-24 NOTE — SUBJECTIVE & OBJECTIVE
PMH, PSH, FH, SH updated and reviewed       Review of Systems     REVIEW OF SYSTEMS  Constitutional: Negative for weight changes. Decreased appetite   Eyes: Negative for visual disturbance.  Respiratory: Negative for cough.   Cardiovascular: Negative for chest pain.  Gastrointestinal: Negative for nausea.  Endocrine: Negative for polyuria, polydipsia.  Musculoskeletal: Negative for back pain.  Skin: Negative for rash.  Neurological: Negative for syncope.  Psychiatric/Behavioral: Negative for depression.      PHYSICAL EXAMINATION:  Vitals:    03/24/18 0734   BP: 111/60   Pulse: 91   Resp: 16   Temp: 98.5 °F (36.9 °C)     Body mass index is 29.92 kg/m².    Physical Exam   PHYSICAL EXAMINATION  Constitutional:  Well developed, well nourished, NAD. sitting in chair   ENT: External ears no masses with nose patent; normal hearing.   Neck:  Supple; trachea midline; no thyromegaly.   Cardiovascular: Normal heart sounds, no LE edema.     Lungs:  Normal effort; lungs anterior bilaterally clear to auscultation.  Abdomen:  Soft, no masses,  no hernias.  MS: No clubbing or cyanosis of nails noted; normal gait   Skin: No rashes, lesions, or ulcers; no nodules.  Psychiatric: Good judgement and insight; normal mood and affect.  Neurological: Cranial nerves are grossly intact.

## 2018-03-24 NOTE — CONSULTS
[facility]  Endocrinology  Diabetes Consult Note    Consult Requested by: Darrick Wolfe MD   Reason for admit: <principal problem not specified>    HISTORY OF PRESENT ILLNESS:  Reason for Consult: Management of T2DM, Hyperglycemia     Surgical Procedure and Date: lung tx in 8/2017    Diabetes diagnosis year: 2017    Home Diabetes Medications:  tresiba 18 units qhs, humalog 12 with meals, plus correction prn    How often checking glucose at home? 1-3 x day   BG readings on regimen: 120-1802  Hypoglycemia on the regimen?  No  Missed doses on regimen?  No    Diabetes Complications include:     Hyperglycemia and Diabetic nephropathy      Complicating diabetes co morbidities:   Glucocorticoid use       HPI:   Patient is a 57 y.o. male with a diagnosis of T2DM, hx of lung tx, HTN and HLD admitted for mild rejection and to receive HC with thymo. Endo consulted for BG management. Previously followed by JULEE Bertrand in endo clinic. A1c decreased from 10->5.5%. On chronic PDN 5mg daily            PMH, PSH, FH, SH updated and reviewed       Review of Systems     REVIEW OF SYSTEMS  Constitutional: Negative for weight changes. Decreased appetite   Eyes: Negative for visual disturbance.  Respiratory: Negative for cough.   Cardiovascular: Negative for chest pain.  Gastrointestinal: Negative for nausea.  Endocrine: Negative for polyuria, polydipsia.  Musculoskeletal: Negative for back pain.  Skin: Negative for rash.  Neurological: Negative for syncope.  Psychiatric/Behavioral: Negative for depression.      PHYSICAL EXAMINATION:  Vitals:    03/24/18 0734   BP: 111/60   Pulse: 91   Resp: 16   Temp: 98.5 °F (36.9 °C)     Body mass index is 29.92 kg/m².    Physical Exam   PHYSICAL EXAMINATION  Constitutional:  Well developed, well nourished, NAD. sitting in chair   ENT: External ears no masses with nose patent; normal hearing.   Neck:  Supple; trachea midline; no thyromegaly.   Cardiovascular: Normal heart  sounds, no LE edema.     Lungs:  Normal effort; lungs anterior bilaterally clear to auscultation.  Abdomen:  Soft, no masses,  no hernias.  MS: No clubbing or cyanosis of nails noted; normal gait   Skin: No rashes, lesions, or ulcers; no nodules.  Psychiatric: Good judgement and insight; normal mood and affect.  Neurological: Cranial nerves are grossly intact.          Labs Reviewed and Include     Recent Labs  Lab 03/23/18 1959   *   CALCIUM 9.2   ALBUMIN 4.2   PROT 6.9      K 4.4   CO2 26      BUN 18   CREATININE 0.8   ALKPHOS 81   ALT 16   AST 19   BILITOT 0.6     Lab Results   Component Value Date    WBC 12.74 (H) 03/24/2018    HGB 10.3 (L) 03/24/2018    HCT 32.0 (L) 03/24/2018    MCV 91 03/24/2018     03/24/2018     No results for input(s): TSH, FREET4 in the last 168 hours.  Lab Results   Component Value Date    HGBA1C 5.5 02/19/2018       Nutritional status:   Body mass index is 29.92 kg/m².  Lab Results   Component Value Date    ALBUMIN 4.2 03/23/2018    ALBUMIN 4.0 03/19/2018    ALBUMIN 3.8 02/19/2018    ALBUMIN 3.8 02/19/2018     No results found for: PREALBUMIN    Estimated Creatinine Clearance: 120.4 mL/min (based on SCr of 0.8 mg/dL).    Accu-Checks  Recent Labs      03/21/18   1044  03/21/18   1353  03/24/18   0840   POCTGLUCOSE  137*  122*  148*        ASSESSMENT and PLAN    Type 2 diabetes mellitus with hyperglycemia, with long-term current use of insulin    BG goal 140-180    FBG at goal despite missing tresiba last so will decrease levemir to 15 units daily.   Given decreased appetite will decrease novolog from 12 to 10 units with meals, moderate correction since on high dose steroids, ac/hs     Discharge:   Based on his BG log, rec same dose of tresiba 18 daily, humalog 12 with meals, low correction         Acute rejection of lung transplant    Per transplant           Lung transplanted    Per transplant         Adrenal cortical steroids causing adverse effect in  therapeutic use    May increase insulin needs                Plan discussed with patient, family, and RN at bedside.     Linda Coburn MD  Endocrinology  [facility]

## 2018-03-24 NOTE — ASSESSMENT & PLAN NOTE
Has known type II DM.  On insulin as an outpatient.    Consult placed to Endocrine and appreciate recs.

## 2018-03-24 NOTE — ASSESSMENT & PLAN NOTE
Outpatient regimen includes: Tacrolimus 2mg BID, Prednisone 5mg, and MMF 1500mg BID.  He has consistently had therapeutic Tacrolimus levels as an outpatient.    Continue therapy and monitor daily Tacrolimus levels.  Will adjust as needed.

## 2018-03-24 NOTE — HPI
Reason for Consult: Management of T2DM, Hyperglycemia     Surgical Procedure and Date: lung tx in 8/2017    Diabetes diagnosis year: 2017    Home Diabetes Medications:  tresiba 18 units qhs, humalog 12 with meals, plus correction prn    How often checking glucose at home? 1-3 x day   BG readings on regimen: 120-1802  Hypoglycemia on the regimen?  No  Missed doses on regimen?  No    Diabetes Complications include:     Hyperglycemia and Diabetic nephropathy      Complicating diabetes co morbidities:   Glucocorticoid use       HPI:   Patient is a 57 y.o. male with a diagnosis of T2DM, hx of lung tx, HTN and HLD admitted for mild rejection and to receive HC with thymo. Endo consulted for BG management. Previously followed by JULEE Bertrand in endo clinic. A1c decreased from 10->5.5%. On chronic PDN 5mg daily

## 2018-03-24 NOTE — ASSESSMENT & PLAN NOTE
BG goal 140-180    FBG at goal despite missing tresiba last so will decrease levemir to 15 units daily.   Given decreased appetite will decrease novolog from 12 to 10 units with meals, moderate correction since on high dose steroids, ac/hs     Discharge:   Based on his BG log, rec same dose of tresiba 18 daily, humalog 12 with meals, low correction

## 2018-03-24 NOTE — H&P
[facility]  Lung Transplant  H&P    Patient Name: Martin Hendrix Jr.  MRN: [id number]  Admission Date: 3/23/2018  Attending Physician: Darrick Wolfe MD  Primary Care Provider: Sukhi Dunham Jr, MD     Subjective:     History of Present Illness:  Martin Hendrix is a 58 y/o man with a hx of BLT on 8/22/2017 for sarcoidosis with pulmonary HTN.  He is CMV D+/R-.  His course has been complicated by left vocal cord dysfunction and A2 rejection x2 starting on 10/2017.  He is admitted today for treatment of recurrent A2 rejection despite adequate immunosuppression.        Today, he has no complaints.  No recent cough, productive sputum, or sick contacts.  Denies any dyspnea an overall feels very well.  He takes all medications at home as directed without side effects.  He was found to have A2 rejection on 10/4/2017.  He received 3 days of pulse steroids as an outpatient.  He then had a repeat bronchoscopy on 11/2/2017 and was found to have continued presence of A2 rejection.  At that time he was admitted for pulse steroids and abx (had difficulty as an outpatient with blood glucose levels with the steroids).  On 11/22/2017, had repeat bronchoscopy and now had A1 rejection.  Outpatient immunosuppression was optimized.  Bronchoscopy on 12/20/2017 showed no rejection.  He had his most recent bronchoscopy last week which showed recurrence of A2 rejection.           Past Medical History:   Diagnosis Date    AVILA (acute kidney injury) 8/27/2017    Atrial fibrillation 8/23/2017    On home oxygen therapy     KRISTYN on CPAP     Osteopenia     Pancreatitis 2009    hospitalized    Pulmonary hypertension     Pure hypercholesterolemia 11/15/2017    Sarcoidosis     Shortness of breath     Type 2 diabetes mellitus 11/5/2017       Past Surgical History:   Procedure Laterality Date    ABDOMINAL SURGERY      6 weeks old    BRONCHOSCOPY      CHEST TUBE INSERTION      CHOLECYSTECTOMY      LUNG BIOPSY       LUNG TRANSPLANT  08/2017       Review of patient's allergies indicates:  No Known Allergies    PTA Medications   Medication Sig    amLODIPine (NORVASC) 5 MG tablet Take 1 tablet (5 mg total) by mouth once daily.    aspirin (ECOTRIN) 81 MG EC tablet Take 1 tablet (81 mg total) by mouth once daily.    blood sugar diagnostic Strp To check BG 4 times daily, to use with insurance preferred meter (one touch verio if possible)    calcium-vitamin D tablet 600 mg-200 units Take 1 tablet by mouth 2 (two) times daily.    famotidine (PEPCID) 20 MG tablet Take 1 tablet (20 mg total) by mouth 2 (two) times daily.    fluticasone (FLONASE) 50 mcg/actuation nasal spray 1 spray by Each Nare route once daily.    folic acid (FOLVITE) 1 MG tablet Take 1 tablet (1 mg total) by mouth once daily.    furosemide (LASIX) 40 MG tablet Take 1 tablet (40 mg total) by mouth once daily. (Patient taking differently: Take 40 mg by mouth daily as needed. )    guaifenesin (MUCINEX) 600 mg 12 hr tablet Take 1 tablet (600 mg total) by mouth 2 (two) times daily.    insulin degludec (TRESIBA FLEXTOUCH U-200) 200 unit/mL (3 mL) InPn Inject 18 Units into the skin every evening.    insulin lispro (HUMALOG KWIKPEN) 100 unit/mL InPn pen 12 units with meals plus correction scale, max TDD 60 units daily    lancets Misc To check BG 4 times daily, to use with insurance preferred meter (one touch verio if possible)    magnesium oxide (MAG-OX) 400 mg tablet Take 1 tablet (400 mg total) by mouth 3 (three) times daily.    metFORMIN (GLUCOPHAGE-XR) 500 MG 24 hr tablet Take 2 tablets (1,000 mg total) by mouth 2 (two) times daily with meals.    multivitamin (THERAGRAN) per tablet Take 1 tablet by mouth once daily.    mycophenolate (CELLCEPT) 250 mg Cap Take 6 capsules (1,500 mg total) by mouth 2 (two) times daily. Dose increase.    oxycodone-acetaminophen (PERCOCET) 7.5-325 mg per tablet Take 1 tablet by mouth every 4 (four) hours as needed.    pen  "needle, diabetic (BD ULTRA-FINE JIM PEN NEEDLES) 32 gauge x 5/32" Ndle Uses 4 times daily, on multiple daily insulin injections    pravastatin (PRAVACHOL) 20 MG tablet Take 1 tablet (20 mg total) by mouth once daily.    predniSONE (DELTASONE) 10 MG tablet Take 5 mg by mouth once daily.    sulfamethoxazole-trimethoprim 800-160mg (BACTRIM DS) 800-160 mg Tab Take 1 tablet by mouth every Mon, Wed, Fri.    tacrolimus (PROGRAF) 0.5 MG Cap Take 5 capsules (2.5 mg) by mouth every morning and 4 capsules (2 mg) by mouth every evening.    valGANciclovir (VALCYTE) 450 mg Tab Take 1 tablet (450 mg total) by mouth 2 (two) times daily.     Family History     Problem Relation (Age of Onset)    Diabetes Father, Brother    Hypertension Mother    Kidney disease Sister        Social History Main Topics    Smoking status: Never Smoker    Smokeless tobacco: Never Used    Alcohol use No    Drug use: No    Sexual activity: Not on file     Review of Systems   Constitutional: Negative for activity change, appetite change, chills, diaphoresis, fatigue, fever and unexpected weight change.   HENT: Negative for dental problem, drooling, ear discharge, ear pain, facial swelling, hearing loss, mouth sores, nosebleeds, postnasal drip, rhinorrhea, sinus pressure, sneezing, sore throat, tinnitus, trouble swallowing and voice change.    Eyes: Negative for photophobia, pain, discharge, redness, itching and visual disturbance.   Respiratory: Negative for apnea, cough, choking, chest tightness, shortness of breath, wheezing and stridor.    Cardiovascular: Negative for chest pain, palpitations and leg swelling.   Gastrointestinal: Negative for abdominal distention, abdominal pain, anal bleeding, blood in stool, constipation, diarrhea, nausea, rectal pain and vomiting.   Endocrine: Negative for cold intolerance and heat intolerance.   Genitourinary: Negative for difficulty urinating, dysuria, flank pain, frequency and hematuria. "   Musculoskeletal: Negative for arthralgias, back pain, gait problem, joint swelling, myalgias, neck pain and neck stiffness.   Skin: Negative for color change.   Allergic/Immunologic: Positive for immunocompromised state.   Neurological: Negative for dizziness, tremors, weakness, light-headedness, numbness and headaches.   Psychiatric/Behavioral: Negative for agitation, hallucinations and sleep disturbance. The patient is not nervous/anxious.      Objective:     Vital Signs (Most Recent):  Temp: 98.5 °F (36.9 °C) (03/24/18 0734)  Pulse: 91 (03/24/18 0734)  Resp: 16 (03/24/18 0734)  BP: 111/60 (03/24/18 0734)  SpO2: 97 % (03/24/18 0734) Vital Signs (24h Range):  Temp:  [97.8 °F (36.6 °C)-100 °F (37.8 °C)] 98.5 °F (36.9 °C)  Pulse:  [] 91  Resp:  [16-20] 16  SpO2:  [93 %-100 %] 97 %  BP: (111-150)/(60-92) 111/60     Weight: 94.6 kg (208 lb 8.9 oz)  Body mass index is 29.92 kg/m².      Intake/Output Summary (Last 24 hours) at 03/24/18 0942  Last data filed at 03/24/18 0500   Gross per 24 hour   Intake                0 ml   Output             1300 ml   Net            -1300 ml       Physical Exam   Constitutional: He is oriented to person, place, and time. He appears well-developed and well-nourished. No distress.   HENT:   Head: Normocephalic and atraumatic.   Nose: Nose normal.   Mouth/Throat: Oropharynx is clear and moist. No oropharyngeal exudate.   Eyes: Conjunctivae and EOM are normal. Pupils are equal, round, and reactive to light. No scleral icterus.   Neck: Normal range of motion. Neck supple. No JVD present. No tracheal deviation present. No thyromegaly present.   Cardiovascular: Normal rate, regular rhythm and normal heart sounds.    Pulmonary/Chest: Effort normal and breath sounds normal. No respiratory distress. He has no wheezes. He has no rales. He exhibits no tenderness.   Abdominal: Soft. Bowel sounds are normal. He exhibits no distension. There is no tenderness.   Musculoskeletal: Normal range of  motion. He exhibits no edema or tenderness.   Neurological: He is alert and oriented to person, place, and time. He has normal reflexes. No cranial nerve deficit.   Skin: Skin is warm and dry. He is not diaphoretic.   Psychiatric: He has a normal mood and affect.       Significant Labs:  CBC:    Recent Labs  Lab 03/24/18  0514   WBC 12.74*   RBC 3.53*   HGB 10.3*   HCT 32.0*      MCV 91   MCH 29.2   MCHC 32.2     BMP:    Recent Labs  Lab 03/23/18 1959      K 4.4      CO2 26   BUN 18   CREATININE 0.8   CALCIUM 9.2        I have reviewed all pertinent labs within the past 24 hours.    Diagnostic Results:  none further    Assessment/Plan:     Acute rejection of lung transplant    Had bronchoscopy on 3/21/2018 which showed recurrence of A2 rejection.    Rejection history is as follows:   A2 on 10/4/2017--treated as outpatient with pulse steroids   A2 on 11/2/2017--treated as inpatient with pulse steroids and abx   A1 on 11/22/2017--Optimized outpatient immunosuppression   A0 on 12/20/2017   Suboptimal specimen from bronchoscopy on 2/21/2018   A2 rejection on 3/21/2018    Given that he has recurrent A2 rejection despite adequate immunosuppression, will admit and administer Thymoglobulin per protocol.  He received his first dose on 3/23/2018.          Lung transplanted    Underwent BLT on 8/22/2017 for sarcoidosis with PH.  CMV D+/R-.      Continue immunosuppression and ppx.        Immunosuppression    Outpatient regimen includes: Tacrolimus 2mg BID, Prednisone 5mg, and MMF 1500mg BID.  He has consistently had therapeutic Tacrolimus levels as an outpatient.    Continue therapy and monitor daily Tacrolimus levels.  Will adjust as needed.        Prophylactic antibiotic    On Valgan and Bactrim as an outpatient.    Continue with ppx.        Adrenal cortical steroids causing adverse effect in therapeutic use    Has known type II DM.  On insulin as an outpatient.    Consult placed to Endocrine and  appreciate recs.          Unilateral complete vocal fold paralysis    Has left sided vocal cord paralysis and a nodule discovered on bronchoscopy.      Follows with ENT and an outpatient.  No acute complaints today.            Samantha Bill, DO  Lung Transplant  [facility]

## 2018-03-24 NOTE — PLAN OF CARE
Problem: Patient Care Overview  Goal: Plan of Care Review  Outcome: Ongoing (interventions implemented as appropriate)  Plan of care reviewed with patient. Pt verbalized understanding. Pt AAOX4. Pt free from falls, injuries and trauma.  Pt free from skin breakdown.  Pt VSS and in NAD.  Pt afebrile.  Pt had no complaints of SOB, or CP.  Pt had no c/o pain this shift. 1st dose of Thymo started to PIV per order. Pre medicated with prescribed meds. Pt tolerated well for first 5 hrs then had c/o nausea and temp went up to 100. Contacted Dr. MICHEL Brock. Ordered to give additional dose of zofran and tylenol. Adequate UOP this shift. No BM this shift. Pt able to ambulated independently, non skid socks on. Pt had uneventful evening. Pt in low locked bed with personal items and call light within reach. Fall precautions maintained.

## 2018-03-24 NOTE — ASSESSMENT & PLAN NOTE
Has left sided vocal cord paralysis and a nodule discovered on bronchoscopy.      Follows with ENT and an outpatient.  No acute complaints today.

## 2018-03-24 NOTE — ASSESSMENT & PLAN NOTE
Had bronchoscopy on 3/21/2018 which showed recurrence of A2 rejection.    Rejection history is as follows:   A2 on 10/4/2017--treated as outpatient with pulse steroids   A2 on 11/2/2017--treated as inpatient with pulse steroids and abx   A1 on 11/22/2017--Optimized outpatient immunosuppression   A0 on 12/20/2017   Suboptimal specimen from bronchoscopy on 2/21/2018   A2 rejection on 3/21/2018    Given that he has recurrent A2 rejection despite adequate immunosuppression, will admit and administer Thymoglobulin per protocol.  He received his first dose on 3/23/2018.

## 2018-03-24 NOTE — ASSESSMENT & PLAN NOTE
Underwent BLT on 8/22/2017 for sarcoidosis with PH.  CMV D+/R-.      Continue immunosuppression and ppx.

## 2018-03-24 NOTE — SUBJECTIVE & OBJECTIVE
Past Medical History:   Diagnosis Date    AVILA (acute kidney injury) 8/27/2017    Atrial fibrillation 8/23/2017    On home oxygen therapy     KRISTYN on CPAP     Osteopenia     Pancreatitis 2009    hospitalized    Pulmonary hypertension     Pure hypercholesterolemia 11/15/2017    Sarcoidosis     Shortness of breath     Type 2 diabetes mellitus 11/5/2017       Past Surgical History:   Procedure Laterality Date    ABDOMINAL SURGERY      6 weeks old    BRONCHOSCOPY      CHEST TUBE INSERTION      CHOLECYSTECTOMY      LUNG BIOPSY      LUNG TRANSPLANT  08/2017       Review of patient's allergies indicates:  No Known Allergies    PTA Medications   Medication Sig    amLODIPine (NORVASC) 5 MG tablet Take 1 tablet (5 mg total) by mouth once daily.    aspirin (ECOTRIN) 81 MG EC tablet Take 1 tablet (81 mg total) by mouth once daily.    blood sugar diagnostic Strp To check BG 4 times daily, to use with insurance preferred meter (one touch verio if possible)    calcium-vitamin D tablet 600 mg-200 units Take 1 tablet by mouth 2 (two) times daily.    famotidine (PEPCID) 20 MG tablet Take 1 tablet (20 mg total) by mouth 2 (two) times daily.    fluticasone (FLONASE) 50 mcg/actuation nasal spray 1 spray by Each Nare route once daily.    folic acid (FOLVITE) 1 MG tablet Take 1 tablet (1 mg total) by mouth once daily.    furosemide (LASIX) 40 MG tablet Take 1 tablet (40 mg total) by mouth once daily. (Patient taking differently: Take 40 mg by mouth daily as needed. )    guaifenesin (MUCINEX) 600 mg 12 hr tablet Take 1 tablet (600 mg total) by mouth 2 (two) times daily.    insulin degludec (TRESIBA FLEXTOUCH U-200) 200 unit/mL (3 mL) InPn Inject 18 Units into the skin every evening.    insulin lispro (HUMALOG KWIKPEN) 100 unit/mL InPn pen 12 units with meals plus correction scale, max TDD 60 units daily    lancets Misc To check BG 4 times daily, to use with insurance preferred meter (one touch verio if possible)  "   magnesium oxide (MAG-OX) 400 mg tablet Take 1 tablet (400 mg total) by mouth 3 (three) times daily.    metFORMIN (GLUCOPHAGE-XR) 500 MG 24 hr tablet Take 2 tablets (1,000 mg total) by mouth 2 (two) times daily with meals.    multivitamin (THERAGRAN) per tablet Take 1 tablet by mouth once daily.    mycophenolate (CELLCEPT) 250 mg Cap Take 6 capsules (1,500 mg total) by mouth 2 (two) times daily. Dose increase.    oxycodone-acetaminophen (PERCOCET) 7.5-325 mg per tablet Take 1 tablet by mouth every 4 (four) hours as needed.    pen needle, diabetic (BD ULTRA-FINE JIM PEN NEEDLES) 32 gauge x 5/32" Ndle Uses 4 times daily, on multiple daily insulin injections    pravastatin (PRAVACHOL) 20 MG tablet Take 1 tablet (20 mg total) by mouth once daily.    predniSONE (DELTASONE) 10 MG tablet Take 5 mg by mouth once daily.    sulfamethoxazole-trimethoprim 800-160mg (BACTRIM DS) 800-160 mg Tab Take 1 tablet by mouth every Mon, Wed, Fri.    tacrolimus (PROGRAF) 0.5 MG Cap Take 5 capsules (2.5 mg) by mouth every morning and 4 capsules (2 mg) by mouth every evening.    valGANciclovir (VALCYTE) 450 mg Tab Take 1 tablet (450 mg total) by mouth 2 (two) times daily.     Family History     Problem Relation (Age of Onset)    Diabetes Father, Brother    Hypertension Mother    Kidney disease Sister        Social History Main Topics    Smoking status: Never Smoker    Smokeless tobacco: Never Used    Alcohol use No    Drug use: No    Sexual activity: Not on file     Review of Systems   Constitutional: Negative for activity change, appetite change, chills, diaphoresis, fatigue, fever and unexpected weight change.   HENT: Negative for dental problem, drooling, ear discharge, ear pain, facial swelling, hearing loss, mouth sores, nosebleeds, postnasal drip, rhinorrhea, sinus pressure, sneezing, sore throat, tinnitus, trouble swallowing and voice change.    Eyes: Negative for photophobia, pain, discharge, redness, itching and " visual disturbance.   Respiratory: Negative for apnea, cough, choking, chest tightness, shortness of breath, wheezing and stridor.    Cardiovascular: Negative for chest pain, palpitations and leg swelling.   Gastrointestinal: Negative for abdominal distention, abdominal pain, anal bleeding, blood in stool, constipation, diarrhea, nausea, rectal pain and vomiting.   Endocrine: Negative for cold intolerance and heat intolerance.   Genitourinary: Negative for difficulty urinating, dysuria, flank pain, frequency and hematuria.   Musculoskeletal: Negative for arthralgias, back pain, gait problem, joint swelling, myalgias, neck pain and neck stiffness.   Skin: Negative for color change.   Allergic/Immunologic: Positive for immunocompromised state.   Neurological: Negative for dizziness, tremors, weakness, light-headedness, numbness and headaches.   Psychiatric/Behavioral: Negative for agitation, hallucinations and sleep disturbance. The patient is not nervous/anxious.      Objective:     Vital Signs (Most Recent):  Temp: 98.5 °F (36.9 °C) (03/24/18 0734)  Pulse: 91 (03/24/18 0734)  Resp: 16 (03/24/18 0734)  BP: 111/60 (03/24/18 0734)  SpO2: 97 % (03/24/18 0734) Vital Signs (24h Range):  Temp:  [97.8 °F (36.6 °C)-100 °F (37.8 °C)] 98.5 °F (36.9 °C)  Pulse:  [] 91  Resp:  [16-20] 16  SpO2:  [93 %-100 %] 97 %  BP: (111-150)/(60-92) 111/60     Weight: 94.6 kg (208 lb 8.9 oz)  Body mass index is 29.92 kg/m².      Intake/Output Summary (Last 24 hours) at 03/24/18 0942  Last data filed at 03/24/18 0500   Gross per 24 hour   Intake                0 ml   Output             1300 ml   Net            -1300 ml       Physical Exam   Constitutional: He is oriented to person, place, and time. He appears well-developed and well-nourished. No distress.   HENT:   Head: Normocephalic and atraumatic.   Nose: Nose normal.   Mouth/Throat: Oropharynx is clear and moist. No oropharyngeal exudate.   Eyes: Conjunctivae and EOM are normal.  Pupils are equal, round, and reactive to light. No scleral icterus.   Neck: Normal range of motion. Neck supple. No JVD present. No tracheal deviation present. No thyromegaly present.   Cardiovascular: Normal rate, regular rhythm and normal heart sounds.    Pulmonary/Chest: Effort normal and breath sounds normal. No respiratory distress. He has no wheezes. He has no rales. He exhibits no tenderness.   Abdominal: Soft. Bowel sounds are normal. He exhibits no distension. There is no tenderness.   Musculoskeletal: Normal range of motion. He exhibits no edema or tenderness.   Neurological: He is alert and oriented to person, place, and time. He has normal reflexes. No cranial nerve deficit.   Skin: Skin is warm and dry. He is not diaphoretic.   Psychiatric: He has a normal mood and affect.       Significant Labs:  CBC:    Recent Labs  Lab 03/24/18  0514   WBC 12.74*   RBC 3.53*   HGB 10.3*   HCT 32.0*      MCV 91   MCH 29.2   MCHC 32.2     BMP:    Recent Labs  Lab 03/23/18 1959      K 4.4      CO2 26   BUN 18   CREATININE 0.8   CALCIUM 9.2        I have reviewed all pertinent labs within the past 24 hours.    Diagnostic Results:  none further

## 2018-03-25 LAB
ALBUMIN SERPL BCP-MCNC: 3.4 G/DL
ALP SERPL-CCNC: 69 U/L
ALT SERPL W/O P-5'-P-CCNC: 14 U/L
ANION GAP SERPL CALC-SCNC: 9 MMOL/L
ANISOCYTOSIS BLD QL SMEAR: SLIGHT
AST SERPL-CCNC: 19 U/L
BACTERIA SPEC AEROBE CULT: NORMAL
BASOPHILS NFR BLD: 0 %
BILIRUB SERPL-MCNC: 0.8 MG/DL
BUN SERPL-MCNC: 17 MG/DL
CALCIUM SERPL-MCNC: 9.2 MG/DL
CHLORIDE SERPL-SCNC: 102 MMOL/L
CO2 SERPL-SCNC: 25 MMOL/L
CREAT SERPL-MCNC: 0.8 MG/DL
DIFFERENTIAL METHOD: ABNORMAL
EOSINOPHIL NFR BLD: 0 %
ERYTHROCYTE [DISTWIDTH] IN BLOOD BY AUTOMATED COUNT: 12.4 %
EST. GFR  (AFRICAN AMERICAN): >60 ML/MIN/1.73 M^2
EST. GFR  (NON AFRICAN AMERICAN): >60 ML/MIN/1.73 M^2
GLUCOSE SERPL-MCNC: 147 MG/DL
GRAM STN SPEC: NORMAL
HCT VFR BLD AUTO: 35 %
HGB BLD-MCNC: 11 G/DL
IMM GRANULOCYTES # BLD AUTO: ABNORMAL K/UL
IMM GRANULOCYTES NFR BLD AUTO: ABNORMAL %
LYMPHOCYTES NFR BLD: 1 %
MAGNESIUM SERPL-MCNC: 1.7 MG/DL
MCH RBC QN AUTO: 29.6 PG
MCHC RBC AUTO-ENTMCNC: 31.4 G/DL
MCV RBC AUTO: 94 FL
METAMYELOCYTES NFR BLD MANUAL: 1 %
MONOCYTES NFR BLD: 4 %
MYELOCYTES NFR BLD MANUAL: 1 %
NEUTROPHILS NFR BLD: 88 %
NEUTS BAND NFR BLD MANUAL: 5 %
NRBC BLD-RTO: 0 /100 WBC
OVALOCYTES BLD QL SMEAR: ABNORMAL
PLATELET # BLD AUTO: 221 K/UL
PLATELET BLD QL SMEAR: ABNORMAL
PMV BLD AUTO: 9.2 FL
POCT GLUCOSE: 147 MG/DL (ref 70–110)
POCT GLUCOSE: 152 MG/DL (ref 70–110)
POCT GLUCOSE: 154 MG/DL (ref 70–110)
POCT GLUCOSE: 209 MG/DL (ref 70–110)
POIKILOCYTOSIS BLD QL SMEAR: SLIGHT
POTASSIUM SERPL-SCNC: 4 MMOL/L
PROT SERPL-MCNC: 6.3 G/DL
RBC # BLD AUTO: 3.71 M/UL
SODIUM SERPL-SCNC: 136 MMOL/L
TACROLIMUS BLD-MCNC: 10.1 NG/ML
WBC # BLD AUTO: 4.16 K/UL

## 2018-03-25 PROCEDURE — 25000003 PHARM REV CODE 250: Performed by: INTERNAL MEDICINE

## 2018-03-25 PROCEDURE — 80197 ASSAY OF TACROLIMUS: CPT

## 2018-03-25 PROCEDURE — 83735 ASSAY OF MAGNESIUM: CPT

## 2018-03-25 PROCEDURE — 20600001 HC STEP DOWN PRIVATE ROOM

## 2018-03-25 PROCEDURE — 99232 SBSQ HOSP IP/OBS MODERATE 35: CPT | Mod: ,,, | Performed by: INTERNAL MEDICINE

## 2018-03-25 PROCEDURE — 80053 COMPREHEN METABOLIC PANEL: CPT

## 2018-03-25 PROCEDURE — 63600175 PHARM REV CODE 636 W HCPCS: Performed by: INTERNAL MEDICINE

## 2018-03-25 PROCEDURE — 25000003 PHARM REV CODE 250: Performed by: PHYSICIAN ASSISTANT

## 2018-03-25 PROCEDURE — 25000242 PHARM REV CODE 250 ALT 637 W/ HCPCS: Performed by: PHYSICIAN ASSISTANT

## 2018-03-25 PROCEDURE — 36415 COLL VENOUS BLD VENIPUNCTURE: CPT

## 2018-03-25 PROCEDURE — 63600175 PHARM REV CODE 636 W HCPCS: Performed by: PHYSICIAN ASSISTANT

## 2018-03-25 RX ORDER — ONDANSETRON 2 MG/ML
4 INJECTION INTRAMUSCULAR; INTRAVENOUS EVERY 6 HOURS PRN
Status: DISCONTINUED | OUTPATIENT
Start: 2018-03-25 | End: 2018-03-26 | Stop reason: HOSPADM

## 2018-03-25 RX ORDER — ACETAMINOPHEN 325 MG/1
650 TABLET ORAL EVERY 4 HOURS PRN
Status: DISCONTINUED | OUTPATIENT
Start: 2018-03-25 | End: 2018-03-26 | Stop reason: HOSPADM

## 2018-03-25 RX ORDER — PROMETHAZINE HYDROCHLORIDE 25 MG/1
25 TABLET ORAL EVERY 6 HOURS PRN
Status: DISCONTINUED | OUTPATIENT
Start: 2018-03-25 | End: 2018-03-26 | Stop reason: HOSPADM

## 2018-03-25 RX ORDER — BUTALBITAL, ACETAMINOPHEN AND CAFFEINE 50; 325; 40 MG/1; MG/1; MG/1
1 TABLET ORAL EVERY 4 HOURS PRN
Status: DISCONTINUED | OUTPATIENT
Start: 2018-03-25 | End: 2018-03-26 | Stop reason: HOSPADM

## 2018-03-25 RX ADMIN — INSULIN ASPART 10 UNITS: 100 INJECTION, SOLUTION INTRAVENOUS; SUBCUTANEOUS at 01:03

## 2018-03-25 RX ADMIN — FAMOTIDINE 20 MG: 20 TABLET, FILM COATED ORAL at 08:03

## 2018-03-25 RX ADMIN — BUTALBITAL, ACETAMINOPHEN AND CAFFEINE 1 TABLET: 50; 325; 40 TABLET ORAL at 04:03

## 2018-03-25 RX ADMIN — MYCOPHENOLATE MOFETIL 1500 MG: 250 CAPSULE ORAL at 08:03

## 2018-03-25 RX ADMIN — ACETAMINOPHEN 650 MG: 325 TABLET ORAL at 10:03

## 2018-03-25 RX ADMIN — FLUTICASONE PROPIONATE 50 MCG: 50 SPRAY, METERED NASAL at 08:03

## 2018-03-25 RX ADMIN — PREDNISONE 5 MG: 5 TABLET ORAL at 08:03

## 2018-03-25 RX ADMIN — HEPARIN SODIUM 150 MG: 1000 INJECTION, SOLUTION INTRAVENOUS; SUBCUTANEOUS at 10:03

## 2018-03-25 RX ADMIN — ONDANSETRON HYDROCHLORIDE 8 MG: 4 TABLET, FILM COATED ORAL at 08:03

## 2018-03-25 RX ADMIN — INSULIN DETEMIR 15 UNITS: 100 INJECTION, SOLUTION SUBCUTANEOUS at 08:03

## 2018-03-25 RX ADMIN — FAMOTIDINE 20 MG: 20 TABLET, FILM COATED ORAL at 09:03

## 2018-03-25 RX ADMIN — TACROLIMUS 0.5 MG: 0.5 CAPSULE ORAL at 08:03

## 2018-03-25 RX ADMIN — INSULIN ASPART 10 UNITS: 100 INJECTION, SOLUTION INTRAVENOUS; SUBCUTANEOUS at 06:03

## 2018-03-25 RX ADMIN — MAGNESIUM OXIDE TAB 400 MG (241.3 MG ELEMENTAL MG) 400 MG: 400 (241.3 MG) TAB at 09:03

## 2018-03-25 RX ADMIN — ACETAMINOPHEN 650 MG: 325 TABLET ORAL at 09:03

## 2018-03-25 RX ADMIN — ASPIRIN 81 MG: 81 TABLET, COATED ORAL at 08:03

## 2018-03-25 RX ADMIN — PRAVASTATIN SODIUM 20 MG: 20 TABLET ORAL at 08:03

## 2018-03-25 RX ADMIN — AMLODIPINE BESYLATE 5 MG: 5 TABLET ORAL at 08:03

## 2018-03-25 RX ADMIN — TACROLIMUS 2 MG: 1 CAPSULE ORAL at 08:03

## 2018-03-25 RX ADMIN — VALGANCICLOVIR 450 MG: 450 TABLET, FILM COATED ORAL at 09:03

## 2018-03-25 RX ADMIN — PROMETHAZINE HYDROCHLORIDE 25 MG: 25 TABLET ORAL at 09:03

## 2018-03-25 RX ADMIN — TACROLIMUS 2.5 MG: 1 CAPSULE ORAL at 06:03

## 2018-03-25 RX ADMIN — MAGNESIUM OXIDE TAB 400 MG (241.3 MG ELEMENTAL MG) 400 MG: 400 (241.3 MG) TAB at 04:03

## 2018-03-25 RX ADMIN — MAGNESIUM OXIDE TAB 400 MG (241.3 MG ELEMENTAL MG) 400 MG: 400 (241.3 MG) TAB at 08:03

## 2018-03-25 RX ADMIN — DIPHENHYDRAMINE HYDROCHLORIDE 50 MG: 50 CAPSULE ORAL at 09:03

## 2018-03-25 RX ADMIN — INSULIN ASPART 4 UNITS: 100 INJECTION, SOLUTION INTRAVENOUS; SUBCUTANEOUS at 06:03

## 2018-03-25 RX ADMIN — VALGANCICLOVIR 450 MG: 450 TABLET, FILM COATED ORAL at 08:03

## 2018-03-25 RX ADMIN — INSULIN ASPART 2 UNITS: 100 INJECTION, SOLUTION INTRAVENOUS; SUBCUTANEOUS at 01:03

## 2018-03-25 NOTE — PROGRESS NOTES
[facility]  Lung Transplant  Progress Note - Floor    Patient Name: Martin Hendrix Jr.  MRN: [id number]  Admission Date: 3/23/2018  Hospital Length of Stay: 2 days  Post-Operative Day: 215  Attending Physician: Darrick Wolfe MD  Primary Care Provider: Sukhi Dunham Jr, MD     Subjective:     Interval History:     No acute events documented per nursing staff.  Concerns yesterday regarding infusion site irritation and pain with thymo.  Patient reports feeling nauseous this am and having chills.  Denies any fevers or sweats just states that he feels cold.  States he tolerated the infusion last night well without pain or irritation at the infusion site.  No complaints of dyspnea or cough.    Continuous Infusions:  Scheduled Meds:   amLODIPine  5 mg Oral Daily    Thymoglobulin 1.5mg/kg, hydrocortisone 20mg, heparin 1000 units in NS 500ml (Peripheral line)  1.5 mg/kg Intravenous Q24H    aspirin  81 mg Oral Daily    famotidine  20 mg Oral BID    fluticasone  1 spray Each Nare Daily    insulin aspart U-100  10 Units Subcutaneous TIDWM    insulin detemir U-100  15 Units Subcutaneous Daily    magnesium oxide  400 mg Oral TID    mycophenolate  1,500 mg Oral BID    pravastatin  20 mg Oral Daily    predniSONE  5 mg Oral Daily    [START ON 3/26/2018] sulfamethoxazole-trimethoprim 800-160mg  1 tablet Oral Every Mon, Wed, Fri    tacrolimus  0.5 mg Oral Daily    tacrolimus  2 mg Oral BID    valGANciclovir  450 mg Oral BID     PRN Meds:acetaminophen **AND** ondansetron **AND** [DISCONTINUED] methylPREDNISolone sodium succinate **AND** diphenhydrAMINE, dextrose 50%, dextrose 50%, furosemide, glucagon (human recombinant), glucose, glucose, insulin aspart U-100, ondansetron, potassium chloride 10% **AND** potassium chloride 10% **AND** potassium chloride 10%, promethazine    Review of patient's allergies indicates:  No Known Allergies    Review of Systems   Constitutional: Positive for chills.  Negative for activity change, appetite change, diaphoresis, fatigue, fever and unexpected weight change.   HENT: Negative for dental problem, drooling, ear discharge, ear pain, facial swelling, hearing loss, mouth sores, nosebleeds, postnasal drip, rhinorrhea, sinus pressure, sneezing, sore throat, tinnitus, trouble swallowing and voice change.    Eyes: Negative for photophobia, pain, discharge, redness, itching and visual disturbance.   Respiratory: Negative for apnea, cough, choking, chest tightness, shortness of breath, wheezing and stridor.    Cardiovascular: Negative for chest pain, palpitations and leg swelling.   Gastrointestinal: Positive for nausea. Negative for abdominal distention, abdominal pain, anal bleeding, blood in stool, constipation, diarrhea, rectal pain and vomiting.   Endocrine: Negative for cold intolerance and heat intolerance.   Genitourinary: Negative for difficulty urinating, dysuria, flank pain, frequency and hematuria.   Musculoskeletal: Negative for arthralgias, back pain, gait problem, joint swelling, myalgias, neck pain and neck stiffness.   Skin: Negative for color change.   Allergic/Immunologic: Positive for immunocompromised state.   Neurological: Negative for dizziness, tremors, weakness, light-headedness, numbness and headaches.   Psychiatric/Behavioral: Negative for agitation, hallucinations and sleep disturbance. The patient is not nervous/anxious.      Objective:   Physical Exam   Constitutional: He is oriented to person, place, and time. He appears well-developed and well-nourished. No distress.   HENT:   Head: Normocephalic and atraumatic.   Nose: Nose normal.   Mouth/Throat: Oropharynx is clear and moist. No oropharyngeal exudate.   Eyes: Conjunctivae and EOM are normal. Pupils are equal, round, and reactive to light. No scleral icterus.   Neck: Normal range of motion. Neck supple. No JVD present. No tracheal deviation present. No thyromegaly present.   Cardiovascular:  Normal rate, regular rhythm and normal heart sounds.    Pulmonary/Chest: Effort normal and breath sounds normal. No respiratory distress. He has no wheezes. He has no rales. He exhibits no tenderness.   Abdominal: Soft. Bowel sounds are normal. He exhibits no distension. There is no tenderness.   Musculoskeletal: Normal range of motion. He exhibits no edema or tenderness.   Neurological: He is alert and oriented to person, place, and time. He has normal reflexes. No cranial nerve deficit.   Skin: Skin is warm and dry. He is not diaphoretic.   Psychiatric: He has a normal mood and affect.         Vital Signs (Most Recent):  Temp: 98.6 °F (37 °C) (03/25/18 0748)  Pulse: 109 (03/25/18 0748)  Resp: 16 (03/25/18 0748)  BP: 117/68 (03/25/18 0748)  SpO2: 98 % (03/25/18 0748) Vital Signs (24h Range):  Temp:  [98.2 °F (36.8 °C)-100.1 °F (37.8 °C)] 98.6 °F (37 °C)  Pulse:  [] 109  Resp:  [16-20] 16  SpO2:  [93 %-98 %] 98 %  BP: ()/(56-75) 117/68     Weight: 94.6 kg (208 lb 8.9 oz)  Body mass index is 29.92 kg/m².      Intake/Output Summary (Last 24 hours) at 03/25/18 0907  Last data filed at 03/24/18 2000   Gross per 24 hour   Intake                0 ml   Output              400 ml   Net             -400 ml       Significant Labs:  CBC:    Recent Labs  Lab 03/25/18  0632   WBC 4.16   RBC 3.71*   HGB 11.0*   HCT 35.0*      MCV 94   MCH 29.6   MCHC 31.4*     BMP:    Recent Labs  Lab 03/23/18  1959      K 4.4      CO2 26   BUN 18   CREATININE 0.8   CALCIUM 9.2      Tacrolimus Levels:    Recent Labs  Lab 03/24/18  0514   TACROLIMUS 10.4     Microbiology:  Microbiology Results (last 7 days)     ** No results found for the last 168 hours. **          I have reviewed all pertinent labs within the past 24 hours.    Diagnostic Results:  none further      Assessment/Plan:     Acute rejection of lung transplant    Had bronchoscopy on 3/21/2018 which showed recurrence of A2 rejection.    Rejection history  is as follows:   A2 on 10/4/2017--treated as outpatient with pulse steroids   A2 on 11/2/2017--treated as inpatient with pulse steroids and abx   A1 on 11/22/2017--Optimized outpatient immunosuppression   A0 on 12/20/2017   Suboptimal specimen from bronchoscopy on 2/21/2018   A2 rejection on 3/21/2018    Given that he has recurrent A2 rejection despite adequate immunosuppression, he is admitted for administration of Thymoglobulin per protocol.  Has currently received 2/3 doses.  Continue with supportive care during administration including pre-medication with Tylenol and Benadryl.  Zofran and Phenergan ordered for nausea.  Continue to monitor WBC while receiving treatment.          Lung transplanted    Underwent BLT on 8/22/2017 for sarcoidosis with PH.  CMV D+/R-.  Rejection history listed above.      Continue immunosuppression and ppx.        Immunosuppression    Outpatient regimen includes: Tacrolimus 2mg BID, Prednisone 5mg, and MMF 1500mg BID.  Tacrolimus has remained therapeutic.    Continue therapy and monitor daily Tacrolimus levels.  Will adjust as needed.  Monitor WBC count while receiving thymo.        Prophylactic antibiotic    On Valgan and Bactrim as an outpatient.    Continue with ppx.        Adrenal cortical steroids causing adverse effect in therapeutic use    Has known type II DM.  On insulin as an outpatient.    Endocrine following and appreciate recs.          Unilateral complete vocal fold paralysis    Has left sided vocal cord paralysis and a nodule discovered on bronchoscopy.      Follows with ENT and an outpatient.  No acute complaints today.            Samantha Bill, DO  Lung Transplant  [facility]    Attending Note:    I have seen and evaluated the patient with the fellow. Their note reflects the content of our discussion and my plan of care. Will increase tacrolimus as tolerated given his refractory rejection.       Darrick Wolfe MD  Pulmonary/Critical Care  Medicine

## 2018-03-25 NOTE — ASSESSMENT & PLAN NOTE
Outpatient regimen includes: Tacrolimus 2mg BID, Prednisone 5mg, and MMF 1500mg BID.  Tacrolimus has remained therapeutic.    Continue therapy and monitor daily Tacrolimus levels.  Will adjust as needed.  Monitor WBC count while receiving thymo.

## 2018-03-25 NOTE — ASSESSMENT & PLAN NOTE
Underwent BLT on 8/22/2017 for sarcoidosis with PH.  CMV D+/R-.  Rejection history listed above.      Continue immunosuppression and ppx.

## 2018-03-25 NOTE — ASSESSMENT & PLAN NOTE
Had bronchoscopy on 3/21/2018 which showed recurrence of A2 rejection.    Rejection history is as follows:   A2 on 10/4/2017--treated as outpatient with pulse steroids   A2 on 11/2/2017--treated as inpatient with pulse steroids and abx   A1 on 11/22/2017--Optimized outpatient immunosuppression   A0 on 12/20/2017   Suboptimal specimen from bronchoscopy on 2/21/2018   A2 rejection on 3/21/2018    Given that he has recurrent A2 rejection despite adequate immunosuppression, he is admitted for administration of Thymoglobulin per protocol.  Has currently received 2/3 doses.  Continue with supportive care during administration including pre-medication with Tylenol and Benadryl.  Zofran and Phenergan ordered for nausea.  Continue to monitor WBC while receiving treatment.

## 2018-03-25 NOTE — SUBJECTIVE & OBJECTIVE
Subjective:     Interval History:     No acute events documented per nursing staff.  Concerns yesterday regarding infusion site irritation and pain with thymo.  Patient reports feeling nauseous this am and having chills.  Denies any fevers or sweats just states that he feels cold.  States he tolerated the infusion last night well without pain or irritation at the infusion site.  No complaints of dyspnea or cough.    Continuous Infusions:  Scheduled Meds:   amLODIPine  5 mg Oral Daily    Thymoglobulin 1.5mg/kg, hydrocortisone 20mg, heparin 1000 units in NS 500ml (Peripheral line)  1.5 mg/kg Intravenous Q24H    aspirin  81 mg Oral Daily    famotidine  20 mg Oral BID    fluticasone  1 spray Each Nare Daily    insulin aspart U-100  10 Units Subcutaneous TIDWM    insulin detemir U-100  15 Units Subcutaneous Daily    magnesium oxide  400 mg Oral TID    mycophenolate  1,500 mg Oral BID    pravastatin  20 mg Oral Daily    predniSONE  5 mg Oral Daily    [START ON 3/26/2018] sulfamethoxazole-trimethoprim 800-160mg  1 tablet Oral Every Mon, Wed, Fri    tacrolimus  0.5 mg Oral Daily    tacrolimus  2 mg Oral BID    valGANciclovir  450 mg Oral BID     PRN Meds:acetaminophen **AND** ondansetron **AND** [DISCONTINUED] methylPREDNISolone sodium succinate **AND** diphenhydrAMINE, dextrose 50%, dextrose 50%, furosemide, glucagon (human recombinant), glucose, glucose, insulin aspart U-100, ondansetron, potassium chloride 10% **AND** potassium chloride 10% **AND** potassium chloride 10%, promethazine    Review of patient's allergies indicates:  No Known Allergies    Review of Systems   Constitutional: Positive for chills. Negative for activity change, appetite change, diaphoresis, fatigue, fever and unexpected weight change.   HENT: Negative for dental problem, drooling, ear discharge, ear pain, facial swelling, hearing loss, mouth sores, nosebleeds, postnasal drip, rhinorrhea, sinus pressure, sneezing, sore throat,  tinnitus, trouble swallowing and voice change.    Eyes: Negative for photophobia, pain, discharge, redness, itching and visual disturbance.   Respiratory: Negative for apnea, cough, choking, chest tightness, shortness of breath, wheezing and stridor.    Cardiovascular: Negative for chest pain, palpitations and leg swelling.   Gastrointestinal: Positive for nausea. Negative for abdominal distention, abdominal pain, anal bleeding, blood in stool, constipation, diarrhea, rectal pain and vomiting.   Endocrine: Negative for cold intolerance and heat intolerance.   Genitourinary: Negative for difficulty urinating, dysuria, flank pain, frequency and hematuria.   Musculoskeletal: Negative for arthralgias, back pain, gait problem, joint swelling, myalgias, neck pain and neck stiffness.   Skin: Negative for color change.   Allergic/Immunologic: Positive for immunocompromised state.   Neurological: Negative for dizziness, tremors, weakness, light-headedness, numbness and headaches.   Psychiatric/Behavioral: Negative for agitation, hallucinations and sleep disturbance. The patient is not nervous/anxious.      Objective:   Physical Exam   Constitutional: He is oriented to person, place, and time. He appears well-developed and well-nourished. No distress.   HENT:   Head: Normocephalic and atraumatic.   Nose: Nose normal.   Mouth/Throat: Oropharynx is clear and moist. No oropharyngeal exudate.   Eyes: Conjunctivae and EOM are normal. Pupils are equal, round, and reactive to light. No scleral icterus.   Neck: Normal range of motion. Neck supple. No JVD present. No tracheal deviation present. No thyromegaly present.   Cardiovascular: Normal rate, regular rhythm and normal heart sounds.    Pulmonary/Chest: Effort normal and breath sounds normal. No respiratory distress. He has no wheezes. He has no rales. He exhibits no tenderness.   Abdominal: Soft. Bowel sounds are normal. He exhibits no distension. There is no tenderness.    Musculoskeletal: Normal range of motion. He exhibits no edema or tenderness.   Neurological: He is alert and oriented to person, place, and time. He has normal reflexes. No cranial nerve deficit.   Skin: Skin is warm and dry. He is not diaphoretic.   Psychiatric: He has a normal mood and affect.         Vital Signs (Most Recent):  Temp: 98.6 °F (37 °C) (03/25/18 0748)  Pulse: 109 (03/25/18 0748)  Resp: 16 (03/25/18 0748)  BP: 117/68 (03/25/18 0748)  SpO2: 98 % (03/25/18 0748) Vital Signs (24h Range):  Temp:  [98.2 °F (36.8 °C)-100.1 °F (37.8 °C)] 98.6 °F (37 °C)  Pulse:  [] 109  Resp:  [16-20] 16  SpO2:  [93 %-98 %] 98 %  BP: ()/(56-75) 117/68     Weight: 94.6 kg (208 lb 8.9 oz)  Body mass index is 29.92 kg/m².      Intake/Output Summary (Last 24 hours) at 03/25/18 0907  Last data filed at 03/24/18 2000   Gross per 24 hour   Intake                0 ml   Output              400 ml   Net             -400 ml       Significant Labs:  CBC:    Recent Labs  Lab 03/25/18  0632   WBC 4.16   RBC 3.71*   HGB 11.0*   HCT 35.0*      MCV 94   MCH 29.6   MCHC 31.4*     BMP:    Recent Labs  Lab 03/23/18 1959      K 4.4      CO2 26   BUN 18   CREATININE 0.8   CALCIUM 9.2      Tacrolimus Levels:    Recent Labs  Lab 03/24/18  0514   TACROLIMUS 10.4     Microbiology:  Microbiology Results (last 7 days)     ** No results found for the last 168 hours. **          I have reviewed all pertinent labs within the past 24 hours.    Diagnostic Results:  none further

## 2018-03-26 VITALS
BODY MASS INDEX: 30.11 KG/M2 | HEART RATE: 82 BPM | OXYGEN SATURATION: 98 % | WEIGHT: 210.31 LBS | HEIGHT: 70 IN | TEMPERATURE: 99 F | RESPIRATION RATE: 18 BRPM | SYSTOLIC BLOOD PRESSURE: 105 MMHG | DIASTOLIC BLOOD PRESSURE: 55 MMHG

## 2018-03-26 PROBLEM — E66.9 OBESITY: Status: ACTIVE | Noted: 2017-08-22

## 2018-03-26 LAB
ALBUMIN SERPL BCP-MCNC: 2.8 G/DL
ALP SERPL-CCNC: 51 U/L
ALT SERPL W/O P-5'-P-CCNC: 12 U/L
ANION GAP SERPL CALC-SCNC: 9 MMOL/L
ANISOCYTOSIS BLD QL SMEAR: SLIGHT
AST SERPL-CCNC: 16 U/L
BASOPHILS NFR BLD: 1 %
BILIRUB SERPL-MCNC: 0.7 MG/DL
BUN SERPL-MCNC: 21 MG/DL
CALCIUM SERPL-MCNC: 8.3 MG/DL
CHLORIDE SERPL-SCNC: 102 MMOL/L
CO2 SERPL-SCNC: 22 MMOL/L
CREAT SERPL-MCNC: 1 MG/DL
DIFFERENTIAL METHOD: ABNORMAL
EOSINOPHIL NFR BLD: 0 %
ERYTHROCYTE [DISTWIDTH] IN BLOOD BY AUTOMATED COUNT: 12.3 %
EST. GFR  (AFRICAN AMERICAN): >60 ML/MIN/1.73 M^2
EST. GFR  (NON AFRICAN AMERICAN): >60 ML/MIN/1.73 M^2
GLUCOSE SERPL-MCNC: 128 MG/DL
HCT VFR BLD AUTO: 28.5 %
HGB BLD-MCNC: 9.2 G/DL
HYPOCHROMIA BLD QL SMEAR: ABNORMAL
IMM GRANULOCYTES # BLD AUTO: ABNORMAL K/UL
IMM GRANULOCYTES NFR BLD AUTO: ABNORMAL %
LYMPHOCYTES NFR BLD: 2 %
MAGNESIUM SERPL-MCNC: 1.6 MG/DL
MCH RBC QN AUTO: 29.5 PG
MCHC RBC AUTO-ENTMCNC: 32.3 G/DL
MCV RBC AUTO: 91 FL
MONOCYTES NFR BLD: 0 %
NEUTROPHILS NFR BLD: 95 %
NEUTS BAND NFR BLD MANUAL: 2 %
NRBC BLD-RTO: 0 /100 WBC
OVALOCYTES BLD QL SMEAR: ABNORMAL
PLATELET # BLD AUTO: 175 K/UL
PLATELET BLD QL SMEAR: ABNORMAL
PMV BLD AUTO: 9.2 FL
POCT GLUCOSE: 120 MG/DL (ref 70–110)
POCT GLUCOSE: 122 MG/DL (ref 70–110)
POIKILOCYTOSIS BLD QL SMEAR: SLIGHT
POLYCHROMASIA BLD QL SMEAR: ABNORMAL
POTASSIUM SERPL-SCNC: 3.4 MMOL/L
PROT SERPL-MCNC: 5.3 G/DL
RBC # BLD AUTO: 3.12 M/UL
SODIUM SERPL-SCNC: 133 MMOL/L
TACROLIMUS BLD-MCNC: 12.5 NG/ML
WBC # BLD AUTO: 4.13 K/UL

## 2018-03-26 PROCEDURE — 99232 SBSQ HOSP IP/OBS MODERATE 35: CPT | Mod: ,,, | Performed by: NURSE PRACTITIONER

## 2018-03-26 PROCEDURE — 25000003 PHARM REV CODE 250: Performed by: PHYSICIAN ASSISTANT

## 2018-03-26 PROCEDURE — 80197 ASSAY OF TACROLIMUS: CPT

## 2018-03-26 PROCEDURE — 80053 COMPREHEN METABOLIC PANEL: CPT

## 2018-03-26 PROCEDURE — 25000003 PHARM REV CODE 250: Performed by: INTERNAL MEDICINE

## 2018-03-26 PROCEDURE — 36415 COLL VENOUS BLD VENIPUNCTURE: CPT

## 2018-03-26 PROCEDURE — 99238 HOSP IP/OBS DSCHRG MGMT 30/<: CPT | Mod: ,,, | Performed by: INTERNAL MEDICINE

## 2018-03-26 PROCEDURE — 63600175 PHARM REV CODE 636 W HCPCS: Performed by: PHYSICIAN ASSISTANT

## 2018-03-26 PROCEDURE — 63600175 PHARM REV CODE 636 W HCPCS: Performed by: INTERNAL MEDICINE

## 2018-03-26 PROCEDURE — 83735 ASSAY OF MAGNESIUM: CPT

## 2018-03-26 RX ORDER — VORICONAZOLE 200 MG/1
200 TABLET, FILM COATED ORAL 2 TIMES DAILY
Qty: 60 TABLET | Refills: 2 | Status: SHIPPED | OUTPATIENT
Start: 2018-03-26 | End: 2018-06-07 | Stop reason: ALTCHOICE

## 2018-03-26 RX ORDER — TACROLIMUS 1 MG/1
1 CAPSULE ORAL 2 TIMES DAILY
Status: DISCONTINUED | OUTPATIENT
Start: 2018-03-26 | End: 2018-03-26 | Stop reason: HOSPADM

## 2018-03-26 RX ORDER — VORICONAZOLE 200 MG/1
200 TABLET, FILM COATED ORAL 2 TIMES DAILY
Status: DISCONTINUED | OUTPATIENT
Start: 2018-03-26 | End: 2018-03-26 | Stop reason: HOSPADM

## 2018-03-26 RX ORDER — FUROSEMIDE 40 MG/1
40 TABLET ORAL DAILY PRN
Status: ON HOLD
Start: 2018-03-26 | End: 2019-03-13 | Stop reason: HOSPADM

## 2018-03-26 RX ORDER — TACROLIMUS 1 MG/1
1 CAPSULE ORAL EVERY 12 HOURS
Start: 2018-03-26 | End: 2018-04-05 | Stop reason: DRUGHIGH

## 2018-03-26 RX ADMIN — BUTALBITAL, ACETAMINOPHEN AND CAFFEINE 1 TABLET: 50; 325; 40 TABLET ORAL at 12:03

## 2018-03-26 RX ADMIN — ASPIRIN 81 MG: 81 TABLET, COATED ORAL at 08:03

## 2018-03-26 RX ADMIN — FAMOTIDINE 20 MG: 20 TABLET, FILM COATED ORAL at 08:03

## 2018-03-26 RX ADMIN — AMLODIPINE BESYLATE 5 MG: 5 TABLET ORAL at 08:03

## 2018-03-26 RX ADMIN — VALGANCICLOVIR 450 MG: 450 TABLET, FILM COATED ORAL at 08:03

## 2018-03-26 RX ADMIN — DIPHENHYDRAMINE HYDROCHLORIDE 50 MG: 50 CAPSULE ORAL at 04:03

## 2018-03-26 RX ADMIN — ACETAMINOPHEN 650 MG: 325 TABLET ORAL at 04:03

## 2018-03-26 RX ADMIN — FLUTICASONE PROPIONATE 50 MCG: 50 SPRAY, METERED NASAL at 08:03

## 2018-03-26 RX ADMIN — ONDANSETRON HYDROCHLORIDE 8 MG: 4 TABLET, FILM COATED ORAL at 04:03

## 2018-03-26 RX ADMIN — TACROLIMUS 2.5 MG: 1 CAPSULE ORAL at 08:03

## 2018-03-26 RX ADMIN — PRAVASTATIN SODIUM 20 MG: 20 TABLET ORAL at 08:03

## 2018-03-26 RX ADMIN — MAGNESIUM OXIDE TAB 400 MG (241.3 MG ELEMENTAL MG) 400 MG: 400 (241.3 MG) TAB at 08:03

## 2018-03-26 RX ADMIN — VORICONAZOLE 200 MG: 200 TABLET ORAL at 10:03

## 2018-03-26 RX ADMIN — PREDNISONE 5 MG: 5 TABLET ORAL at 08:03

## 2018-03-26 RX ADMIN — INSULIN DETEMIR 15 UNITS: 100 INJECTION, SOLUTION SUBCUTANEOUS at 08:03

## 2018-03-26 RX ADMIN — INSULIN ASPART 10 UNITS: 100 INJECTION, SOLUTION INTRAVENOUS; SUBCUTANEOUS at 12:03

## 2018-03-26 RX ADMIN — INSULIN ASPART 10 UNITS: 100 INJECTION, SOLUTION INTRAVENOUS; SUBCUTANEOUS at 08:03

## 2018-03-26 NOTE — ASSESSMENT & PLAN NOTE
BG goal 140-180    Continue Levemir 15 units daily.   Continue Novolog 10 units with meals, and moderate dose correction scale.   Bg monitoring ac/hs.     Discharge:   Based on his home logs, resume tresiba 18 units daily.  Humalog 10 units with meals plus low dose correction. If requiring correction with each meal, resume Humalog 12 units daily plus low dose correction.

## 2018-03-26 NOTE — PROGRESS NOTES
Admit/Discharge Note     Met with patient and mother to assess needs. Patient is a 57 y.o.  male, admitted for Acute rejection of lung transplant [T86.810]. Pt is s/p lung txp from 8/22/17.      Patient admitted from home on 3/23/2018 .  At this time, patient presents as alert and oriented x 4, pleasant, good eye contact and asking and answering questions appropriately.  At this time, patients caregiver presents as alert and oriented x 4, pleasant, good eye contact, recall good, concentration/judgement good and asking and answering questions appropriately.    Household/Family Systems     Patient resides with patient's self, at [address]  [facility] [address].  Support system includes pt's mother Susy, daughter Sybil, brother Williams, and sister Albina.  Patient does not have dependents that are need of being cared for.     Patients primary caregiver is Sybil Teixeira, patients daughter, phone number [phone].  Confirmed patients contact information is [phone] (home);     Telephone Information:   Mobile [phone]   .  During admission, patient's caregiver plans to stay in patient's room.  Confirmed patient and patients caregivers do have access to reliable transportation.    Cognitive Status/Learning     Patient reports reading ability as 12th grade and states patient does have difficulty with seeing and and wears glasses.  Patient reports patient learns best by Written material/Verbal explanation/Demonstration/ Hands on practice.   Needed: No.   Highest education level: High School (9-12) or GED    Vocation/Disability   .  Working for Income: No  If no, reason not working: Disability    Patient is disabled due to Sarcoidosis since 2/2017.  Prior to disability, patient  was employed as a .    Adherence     Patient reports a high level of adherence to patients health care regimen.  Adherence counseling and education provided. Patient verbalizes  understanding.    Substance Use    Patient reports the following substance usage.    Tobacco: none, patient denies any use.  Alcohol: none, patient denies any use.  Illicit Drugs/Non-prescribed Medications: none, patient denies any use.  Patient states clear understanding of the potential impact of substance use.  Substance abstinence/cessation counseling, education and resources provided and reviewed.     Services Utilizing/ADLS    Infusion Service: Prior to admission, patient utilizing? yes in past with [facility]   Home Health: Prior to admission, patient utilizing? no  DME: Prior to admission, no prior to txp pt had O2 supplies through [facility]  Pulmonary/Cardiac Rehab: Prior to admission, no  Dialysis:  Prior to admission, no  Transplant Specialty Pharmacy:  Prior to admission, no.       [facility] [id number] [facility] [place] [address] AT [facility]  [address] [place] [address]  Phone: [phone] Fax: [phone]    [facility] [address]  [address] [facility] [address]  Phone: [phone] Fax: [phone]      Prior to admission, patient reports patient was independent with ADLS and was driving.  Patient reports patient is able to care for self at this time..  Patient indicates a willingness to care for self once medically cleared to do so.    Insurance/Medications    Insured by   Payor/Plan Subscr  Sex Relation Sub. Ins. ID Effective Group Num   1. AETNA - AETNA* JAYE BORDEN* 1960 Male  [id number] 17 [id number]                                   [address]   2. AET[employer] - AETNA* JAYE BORDEN* 1960 Male  [id number] 17 [id number]                                   [address]      Primary Insurance (for UNOS reporting): Private Insurance  Secondary Insurance (for UNOS reporting): None    Patient reports patient is able to obtain and afford medications at this time  and at time of discharge.    Living Will/Healthcare Power of     Patient states patient does not have a LW and/or HCPA.   provided education regarding LW and HCPA and the completion of forms.    Coping/Mental Health    Patient is coping adequately with the aid of  family members.  Patient denies mental health difficulties.     Discharge Note:    At time of discharge, patient plans to discharge to patient's home care of self.  Patient will transport self home and will be accompanied by his mother.  Pt discharging home with no needs. Pt reports coping adequately with discharge at this time and was sitting up in chair alert and oriented x 4 with pleasant affect.  Pt verbalized no additional needs or concerns at this time.  Contact information provided. SW to remain available.       Additional Concerns     providing ongoing psychosocial support, education, resources and d/c planning as needed.  SW remains available. Patient verbalizes understanding and agreement with information reviewed, social work availability, and how to access available resources as needed.

## 2018-03-26 NOTE — PLAN OF CARE
Problem: Patient Care Overview  Goal: Plan of Care Review  Outcome: Outcome(s) achieved Date Met: 03/26/18  Pt receiving discharge instructions. Thymo treatment 3/3 completed this afternoon. Pt seen by pharmacy, blue card updated. No questions/concerns by patient at this time. Encouraged to attend follow-up appointments, has verbalized understanding.

## 2018-03-26 NOTE — PLAN OF CARE
Problem: Patient Care Overview  Goal: Plan of Care Review  Outcome: Ongoing (interventions implemented as appropriate)  Plan of care reviewed with patient. Pt verbalized understanding. Pt AAOX4. Pt free from falls, injuries and trauma.  Pt free from skin breakdown.  Pt VSS and in NAD.  Pt afebrile.  Pt had no complaints of SOB, N/V or CP.  Pt c/o HA.  Fioricet  given with moderate results given. Pt ambulated independently in room.  Adequate UOP this shift. 4 loose stools this shift. 3rd dose of Thymo given. Pt had uneventful evening. Pt in low locked bed with personal items and call light within reach. Fall precautions maintained.

## 2018-03-26 NOTE — SUBJECTIVE & OBJECTIVE
"Interval HPI:   Overnight events: BG reading at goal except one in 200s; not correlated with snacking/ eating. Eating most of meals; no nausea. Possible d/c to home today.   Eatin-100%  Hypoglycemia and intervention: No  Fever: No  TPN and/or TF: No    /64 (BP Location: Right arm, Patient Position: Sitting)   Pulse 81   Temp 98.5 °F (36.9 °C) (Oral)   Resp 18   Ht 5' 10" (1.778 m)   Wt 95.4 kg (210 lb 5.1 oz)   SpO2 95%   BMI 30.18 kg/m²     Labs Reviewed and Include      Recent Labs  Lab 18  0430   *   CALCIUM 8.3*   ALBUMIN 2.8*   PROT 5.3*   *   K 3.4*   CO2 22*      BUN 21*   CREATININE 1.0   ALKPHOS 51*   ALT 12   AST 16   BILITOT 0.7     Lab Results   Component Value Date    WBC 4.13 2018    HGB 9.2 (L) 2018    HCT 28.5 (L) 2018    MCV 91 2018     2018     No results for input(s): TSH, FREET4 in the last 168 hours.  Lab Results   Component Value Date    HGBA1C 5.5 2018       Nutritional status:   Body mass index is 30.18 kg/m².  Lab Results   Component Value Date    ALBUMIN 2.8 (L) 2018    ALBUMIN 3.4 (L) 2018    ALBUMIN 4.2 2018     No results found for: PREALBUMIN    Estimated Creatinine Clearance: 96.8 mL/min (based on SCr of 1 mg/dL).    Accu-Checks  Recent Labs      18   0840  18   1214  18   1821  18   2257  18   1040  18   1321  18   1832  18   2350  18   0746   POCTGLUCOSE  148*  136*  200*  182*  154*  152*  209*  147*  120*       Current Medications and/or Treatments Impacting Glycemic Control  Immunotherapy:  Immunosuppressants         Stop Route Frequency     tacrolimus capsule 1 mg      -- Oral 2 times daily        Steroids:   Hormones     Start     Stop Route Frequency Ordered    18 0900  predniSONE tablet 5 mg      -- Oral Daily 18 193        Pressors:    Autonomic Drugs     Start     Stop Route Frequency Ordered    18 " 2346  EPINEPHrine injection 0.5 mg      03/23 2359 IM Once as needed 03/23/18 2246        Hyperglycemia/Diabetes Medications: Antihyperglycemics     Start     Stop Route Frequency Ordered    03/24/18 1119  insulin aspart U-100 pen 1-10 Units      -- SubQ Before meals & nightly PRN 03/24/18 1020    03/24/18 1030  insulin detemir U-100 pen 15 Units      -- SubQ Daily 03/24/18 1020    03/24/18 1030  insulin aspart U-100 pen 10 Units      -- SubQ 3 times daily with meals 03/24/18 1020

## 2018-03-26 NOTE — PROGRESS NOTES
Patient being discharged home today following completion of thymoglobulin infusion.  Discharge instructions and follow up plan reviewed with him as follows:  1.  Labs at [facility] on Monday, April 2, 2018 between 0800 - 0900.  Instructed patient to take all morning medications after his blood is drawn.  2.  Return to the lung transplant clinic for labs, chest x-ray, PFT and doctor visit on Monday, April 9, 2018.  Reviewed times, locations and special instructions (e.g., take all morning medications after blood is drawn) for these appointments.  3.  Repeat bronchoscopy with lung biopsies to be scheduled in mid-April.  Provided verbal and written instructions re: this discharge plan.  The patient asked questions, which were answered to his satisfaction.  Her verbalized his understanding of all information discussed and demonstrated his readiness for discharge.

## 2018-03-26 NOTE — DISCHARGE SUMMARY
[facility]  Lung Transplant  Discharge Summary      Patient Name: Martin Hendrix Jr.  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  He is CMV D+/R-.  His course has been complicated by left vocal cord dysfunction and A2 rejection x2 starting on 10/2017.  He is admitted today for treatment of recurrent A2 rejection despite adequate immunosuppression.        Today, he has no complaints.  No recent cough, productive sputum, or sick contacts.  Denies any dyspnea an overall feels very well.  He takes all medications at home as directed without side effects.  He was found to have A2 rejection on 10/4/2017.  He received 3 days of pulse steroids as an outpatient.  He then had a repeat bronchoscopy on 11/2/2017 and was found to have continued presence of A2 rejection.  At that time he was admitted for pulse steroids and abx (had difficulty as an outpatient with blood glucose levels with the steroids).  On 11/22/2017, had repeat bronchoscopy and now had A1 rejection.  Outpatient immunosuppression was optimized.  Bronchoscopy on 12/20/2017 showed no rejection.  He had his most recent bronchoscopy last week which showed recurrence of A2 rejection.      Hospital Course: He underwent bronchoscopy on 3/21/2018 which showed recurrence of A2 rejection. Given that he has recurrent A2 rejection despite adequate immunosuppression, he was admitted for administration of Thymoglobulin x 3 total doses, receiving last dose prior to d/c today. Will optimize immunosuppression as outpatient and adjust as necessary. Will continue to hold MMF for approximately 1-2 weeks s/p thymo administration, Voriconazole 200  mg BID for mold coverage s/p thymo. Plan to follow up in outpatient clinic in approximately one week.     Acute rejection of lung transplant     Rejection history is as follows:              A2 on 10/4/2017--treated as outpatient with pulse steroids              A2 on 11/2/2017--treated as inpatient with pulse steroids and abx              A1 on 11/22/2017--Optimized outpatient immunosuppression              A0 on 12/20/2017              Suboptimal specimen from bronchoscopy on 2/21/2018              A2 rejection on 3/21/2018    Follow up in outpatient lung transplant clinic in one week.        Lung transplanted     Underwent BLT on 8/22/2017 for sarcoidosis with PH.  CMV D+/R-.  Rejection history listed above.       Continue immunosuppression and ppx.       Immunosuppression     Outpatient regimen includes: Tacrolimus 2mg BID, Prednisone 5mg, and MMF 1500mg BID.  Tacrolimus has remained therapeutic.     Will continue to hold MMF upon d/c. Goal tacrolimus levels increased to optimize immunosuppression.           Prophylactic antibiotic     On Valgan and Bactrim as an outpatient.     Continue with ppx. Voriconazole added for mold coverage.          Adrenal cortical steroids causing adverse effect in therapeutic use     Has known type II DM.  Continue outpatient regimen.       Unilateral complete vocal fold paralysis     Has left sided vocal cord paralysis and a nodule discovered on bronchoscopy.       Follows with ENT as an outpatient prn.         Consults         Status Ordering Provider     Inpatient consult to Endocrinology  Once     Provider:  (Not yet assigned)    Completed ROZ JIMÉNEZ          Significant Diagnostic Studies: Labs: All labs within the past 24 hours have been reviewed    Pending Diagnostic Studies:     None        Final Active Diagnoses:    Diagnosis Date Noted POA    PRINCIPAL PROBLEM:  Lung transplanted [Z94.2] 08/22/2017 Not Applicable    Acute rejection of lung transplant [T86.810]  11/03/2017 Yes    Unilateral complete vocal fold paralysis [J38.01] 09/29/2017 Yes    Immunosuppression [D89.9] 08/22/2017 Yes    Prophylactic antibiotic [Z79.2] 08/22/2017 Not Applicable    Adrenal cortical steroids causing adverse effect in therapeutic use [T38.0X5A] 08/22/2017 Yes      Problems Resolved During this Admission:    Diagnosis Date Noted Date Resolved POA      Discharged Condition: stable    Disposition:     Medications:  Reconciled Home Medications:   Current Discharge Medication List      START taking these medications    Details   voriconazole (VFEND) 200 MG Tab Take 1 tablet (200 mg total) by mouth 2 (two) times daily.  Qty: 60 tablet, Refills: 2         CONTINUE these medications which have CHANGED    Details   furosemide (LASIX) 40 MG tablet Take 1 tablet (40 mg total) by mouth daily as needed.    Associated Diagnoses: Edema of both legs      tacrolimus (PROGRAF) 1 MG Cap Take 1 capsule (1 mg total) by mouth every 12 (twelve) hours.    Associated Diagnoses: Lung replaced by transplant         CONTINUE these medications which have NOT CHANGED    Details   amLODIPine (NORVASC) 5 MG tablet Take 1 tablet (5 mg total) by mouth once daily.  Qty: 90 tablet, Refills: 3    Associated Diagnoses: Essential hypertension; Lung replaced by transplant      aspirin (ECOTRIN) 81 MG EC tablet Take 1 tablet (81 mg total) by mouth once daily.  Qty: 90 tablet, Refills: 3    Associated Diagnoses: Lung replaced by transplant      blood sugar diagnostic Strp To check BG 4 times daily, to use with insurance preferred meter (one touch verio if possible)  Qty: 350 strip, Refills: 3    Associated Diagnoses: Type 2 diabetes mellitus with hyperglycemia, with long-term current use of insulin      calcium-vitamin D tablet 600 mg-200 units Take 1 tablet by mouth 2 (two) times daily.  Qty: 180 tablet, Refills: 3    Associated Diagnoses: Lung replaced by transplant      famotidine (PEPCID) 20 MG tablet Take 1 tablet (20 mg  "total) by mouth 2 (two) times daily.  Qty: 180 tablet, Refills: 3    Associated Diagnoses: Lung replaced by transplant      fluticasone (FLONASE) 50 mcg/actuation nasal spray 1 spray by Each Nare route once daily.  Qty: 1 Bottle, Refills: 3    Associated Diagnoses: Sinusitis, unspecified chronicity, unspecified location      folic acid (FOLVITE) 1 MG tablet Take 1 tablet (1 mg total) by mouth once daily.  Qty: 90 tablet, Refills: 3    Associated Diagnoses: Lung replaced by transplant      insulin degludec (TRESIBA FLEXTOUCH U-200) 200 unit/mL (3 mL) InPn Inject 18 Units into the skin every evening.  Qty: 3 Syringe, Refills: 3    Associated Diagnoses: Type 2 diabetes mellitus with hyperglycemia, with long-term current use of insulin      insulin lispro (HUMALOG KWIKPEN) 100 unit/mL InPn pen 12 units with meals plus correction scale, max TDD 60 units daily  Qty: 1 Box, Refills: 3    Associated Diagnoses: Type 2 diabetes mellitus with hyperglycemia, with long-term current use of insulin      lancets Misc To check BG 4 times daily, to use with insurance preferred meter (one touch verio if possible)  Qty: 350 each, Refills: 3    Associated Diagnoses: Type 2 diabetes mellitus with hyperglycemia, with long-term current use of insulin      magnesium oxide (MAG-OX) 400 mg tablet Take 1 tablet (400 mg total) by mouth 3 (three) times daily.  Qty: 270 tablet, Refills: 3    Associated Diagnoses: Lung replaced by transplant; Hypomagnesemia      metFORMIN (GLUCOPHAGE-XR) 500 MG 24 hr tablet Take 2 tablets (1,000 mg total) by mouth 2 (two) times daily with meals.  Qty: 120 tablet, Refills: 11      multivitamin (THERAGRAN) per tablet Take 1 tablet by mouth once daily.      oxycodone-acetaminophen (PERCOCET) 7.5-325 mg per tablet Take 1 tablet by mouth every 4 (four) hours as needed.  Qty: 40 tablet, Refills: 0      pen needle, diabetic (BD ULTRA-FINE JIM PEN NEEDLES) 32 gauge x 5/32" Ndle Uses 4 times daily, on multiple daily " insulin injections  Qty: 350 each, Refills: 3    Associated Diagnoses: Type 2 diabetes mellitus with hyperglycemia, with long-term current use of insulin      pravastatin (PRAVACHOL) 20 MG tablet Take 1 tablet (20 mg total) by mouth once daily.  Qty: 90 tablet, Refills: 3      predniSONE (DELTASONE) 10 MG tablet Take 5 mg by mouth once daily.      sulfamethoxazole-trimethoprim 800-160mg (BACTRIM DS) 800-160 mg Tab Take 1 tablet by mouth every Mon, Wed, Fri.  Qty: 45 tablet, Refills: 3    Associated Diagnoses: Lung replaced by transplant      valGANciclovir (VALCYTE) 450 mg Tab Take 1 tablet (450 mg total) by mouth 2 (two) times daily.  Qty: 180 tablet, Refills: 3    Associated Diagnoses: Lung replaced by transplant         STOP taking these medications       ACCU-CHEK DEANNE PLUS METER Misc Comments:   Reason for Stopping:         guaifenesin (MUCINEX) 600 mg 12 hr tablet Comments:   Reason for Stopping:         mycophenolate (CELLCEPT) 250 mg Cap Comments:   Reason for Stopping:           \  Patient Instructions:   No discharge procedures on file.  Follow Up:      Teresa Trejo PA-C  Lung Transplant  [facility]

## 2018-03-26 NOTE — PROGRESS NOTES
"[facility]  Endocrinology  Progress Note    Admit Date: 3/23/2018     Reason for Consult: Management of T2DM, Hyperglycemia     Surgical Procedure and Date: lung tx in 2017    Diabetes diagnosis year:     Home Diabetes Medications:  tresiba 18 units qhs, humalog 12 with meals, plus correction prn    How often checking glucose at home? 1-3 x day   BG readings on regimen: 120-1802  Hypoglycemia on the regimen?  No  Missed doses on regimen?  No    Diabetes Complications include:     Hyperglycemia and Diabetic nephropathy      Complicating diabetes co morbidities:   Glucocorticoid use       HPI:   Patient is a 57 y.o. male with a diagnosis of T2DM, hx of lung tx, HTN and HLD admitted for mild rejection and to receive HC with thymo. Endo consulted for BG management. Previously followed by JULEE Bertrand in endo clinic. A1c decreased from 10->5.5%. On chronic PDN 5mg daily          Interval HPI:   Overnight events: BG reading at goal except 1800 reading of 209; not correlated with snacking/ eating per pt. Eating most of meals; no nausea. Possible d/c to home today.   Eatin-100%  Hypoglycemia and intervention: No  Fever: No  TPN and/or TF: No    /64 (BP Location: Right arm, Patient Position: Sitting)   Pulse 81   Temp 98.5 °F (36.9 °C) (Oral)   Resp 18   Ht 5' 10" (1.778 m)   Wt 95.4 kg (210 lb 5.1 oz)   SpO2 95%   BMI 30.18 kg/m²       Labs Reviewed and Include      Recent Labs  Lab 18  0430   *   CALCIUM 8.3*   ALBUMIN 2.8*   PROT 5.3*   *   K 3.4*   CO2 22*      BUN 21*   CREATININE 1.0   ALKPHOS 51*   ALT 12   AST 16   BILITOT 0.7     Lab Results   Component Value Date    WBC 4.13 2018    HGB 9.2 (L) 2018    HCT 28.5 (L) 2018    MCV 91 2018     2018     No results for input(s): TSH, FREET4 in the last 168 hours.  Lab Results   Component Value Date    HGBA1C 5.5 2018       Nutritional status:   Body mass index is " 30.18 kg/m².  Lab Results   Component Value Date    ALBUMIN 2.8 (L) 03/26/2018    ALBUMIN 3.4 (L) 03/25/2018    ALBUMIN 4.2 03/23/2018     No results found for: PREALBUMIN    Estimated Creatinine Clearance: 96.8 mL/min (based on SCr of 1 mg/dL).    Accu-Checks  Recent Labs      03/24/18   0840  03/24/18   1214  03/24/18   1821  03/24/18   2257  03/25/18   1040  03/25/18   1321  03/25/18   1832  03/25/18   2350  03/26/18   0746   POCTGLUCOSE  148*  136*  200*  182*  154*  152*  209*  147*  120*       Current Medications and/or Treatments Impacting Glycemic Control  Immunotherapy:  Immunosuppressants         Stop Route Frequency     tacrolimus capsule 1 mg      -- Oral 2 times daily        Steroids:   Hormones     Start     Stop Route Frequency Ordered    03/24/18 0900  predniSONE tablet 5 mg      -- Oral Daily 03/23/18 1938        Pressors:    Autonomic Drugs     Start     Stop Route Frequency Ordered    03/23/18 2346  EPINEPHrine injection 0.5 mg      03/23 2359 IM Once as needed 03/23/18 2246        Hyperglycemia/Diabetes Medications: Antihyperglycemics     Start     Stop Route Frequency Ordered    03/24/18 1119  insulin aspart U-100 pen 1-10 Units      -- SubQ Before meals & nightly PRN 03/24/18 1020    03/24/18 1030  insulin detemir U-100 pen 15 Units      -- SubQ Daily 03/24/18 1020    03/24/18 1030  insulin aspart U-100 pen 10 Units      -- SubQ 3 times daily with meals 03/24/18 1020          ASSESSMENT and PLAN    * Lung transplanted    Per transplant         Type 2 diabetes mellitus with hyperglycemia, with long-term current use of insulin    BG goal 140-180    Continue Levemir 15 units daily.   Continue Novolog 10 units with meals, and moderate dose correction scale.   Bg monitoring ac/hs.     Discharge:   Based on his home logs, resume tresiba 18 units daily.  Humalog 10 units with meals plus low dose correction. If requiring correction with each meal, resume Humalog 12 units daily plus low dose correction.          Acute rejection of lung transplant    Per transplant           Class 1 obesity with body mass index (BMI) of 30.0 to 30.9 in adult    Body mass index is 30.18 kg/m².   May increase insulin resistance.             Immunosuppression      May increase insulin resistance.         Adrenal cortical steroids causing adverse effect in therapeutic use    May increase insulin needs                CIELO Friedman, FNP  Endocrinology  [facility]

## 2018-03-28 DIAGNOSIS — T86.819 COMPLICATION OF TRANSPLANTED LUNG, UNSPECIFIED COMPLICATION: Primary | ICD-10-CM

## 2018-03-28 NOTE — PROGRESS NOTES
Received vtorb from Dr. Wolfe to schedule a follow up bronchoscopy post A2 treatment.  Orders entered and submitted for scheduling.

## 2018-04-03 ENCOUNTER — TELEPHONE (OUTPATIENT)
Dept: TRANSPLANT | Facility: CLINIC | Age: 58
End: 2018-04-03

## 2018-04-03 NOTE — TELEPHONE ENCOUNTER
Patient was due to have labs drawn on 4/2/18 but the appointment could not be found and the labs were not noted in Epic.    Called patient, who stated he went to [facility] on 4/2 as instructed but was told an appointment was not scheduled.  He added that he called the transplant center at [phone] but was told no one in lung transplant could be reached and he did not have a lab appointment scheduled.  I apologized to the patient for the miscommunication among our staff re: his lab appointment and the inconvenience he experienced yesterday.  Asked patient if he could report to [facility] tomorrow for lab work, and he agreed.  Instructed him to fast and take his morning Prograf dose after his blood is drawn.  The patient verbalized his understanding.

## 2018-04-04 ENCOUNTER — LAB VISIT (OUTPATIENT)
Dept: LAB | Facility: HOSPITAL | Age: 58
End: 2018-04-04
Attending: INTERNAL MEDICINE
Payer: COMMERCIAL

## 2018-04-04 DIAGNOSIS — Z94.2 LUNG REPLACED BY TRANSPLANT: ICD-10-CM

## 2018-04-04 LAB
ANION GAP SERPL CALC-SCNC: 10 MMOL/L
ANISOCYTOSIS BLD QL SMEAR: SLIGHT
BASO STIPL BLD QL SMEAR: ABNORMAL
BASOPHILS NFR BLD: 4 %
BUN SERPL-MCNC: 20 MG/DL
CALCIUM SERPL-MCNC: 8.9 MG/DL
CHLORIDE SERPL-SCNC: 105 MMOL/L
CO2 SERPL-SCNC: 27 MMOL/L
CREAT SERPL-MCNC: 1 MG/DL
DIFFERENTIAL METHOD: ABNORMAL
EOSINOPHIL NFR BLD: 2 %
ERYTHROCYTE [DISTWIDTH] IN BLOOD BY AUTOMATED COUNT: 12.7 %
EST. GFR  (AFRICAN AMERICAN): >60 ML/MIN/1.73 M^2
EST. GFR  (NON AFRICAN AMERICAN): >60 ML/MIN/1.73 M^2
GLUCOSE SERPL-MCNC: 137 MG/DL
HCT VFR BLD AUTO: 35.6 %
HGB BLD-MCNC: 10.7 G/DL
HYPOCHROMIA BLD QL SMEAR: ABNORMAL
IMM GRANULOCYTES # BLD AUTO: ABNORMAL K/UL
IMM GRANULOCYTES NFR BLD AUTO: ABNORMAL %
LYMPHOCYTES NFR BLD: 5 %
MCH RBC QN AUTO: 28.8 PG
MCHC RBC AUTO-ENTMCNC: 30.1 G/DL
MCV RBC AUTO: 96 FL
MONOCYTES NFR BLD: 5 %
MYELOCYTES NFR BLD MANUAL: 2 %
NEUTROPHILS NFR BLD: 79 %
NEUTS BAND NFR BLD MANUAL: 3 %
NRBC BLD-RTO: 0 /100 WBC
OVALOCYTES BLD QL SMEAR: ABNORMAL
PLATELET # BLD AUTO: 667 K/UL
PLATELET BLD QL SMEAR: ABNORMAL
PMV BLD AUTO: 8.4 FL
POIKILOCYTOSIS BLD QL SMEAR: SLIGHT
POLYCHROMASIA BLD QL SMEAR: ABNORMAL
POTASSIUM SERPL-SCNC: 4.6 MMOL/L
RBC # BLD AUTO: 3.71 M/UL
SCHISTOCYTES BLD QL SMEAR: ABNORMAL
SCHISTOCYTES BLD QL SMEAR: PRESENT
SODIUM SERPL-SCNC: 142 MMOL/L
TOXIC GRANULES BLD QL SMEAR: PRESENT
WBC # BLD AUTO: 8.11 K/UL
WBC TOXIC VACUOLES BLD QL SMEAR: PRESENT

## 2018-04-04 PROCEDURE — 85060 BLOOD SMEAR INTERPRETATION: CPT | Mod: ,,, | Performed by: PATHOLOGY

## 2018-04-04 PROCEDURE — 36415 COLL VENOUS BLD VENIPUNCTURE: CPT | Mod: PO

## 2018-04-04 PROCEDURE — 80048 BASIC METABOLIC PNL TOTAL CA: CPT

## 2018-04-04 PROCEDURE — 85007 BL SMEAR W/DIFF WBC COUNT: CPT

## 2018-04-04 PROCEDURE — 85027 COMPLETE CBC AUTOMATED: CPT

## 2018-04-04 PROCEDURE — 80197 ASSAY OF TACROLIMUS: CPT

## 2018-04-05 DIAGNOSIS — Z94.2 LUNG REPLACED BY TRANSPLANT: Primary | ICD-10-CM

## 2018-04-05 LAB
PATH REV BLD -IMP: NORMAL
TACROLIMUS BLD-MCNC: 24.6 NG/ML

## 2018-04-05 RX ORDER — TACROLIMUS 0.5 MG/1
0.5 CAPSULE ORAL EVERY 12 HOURS
Qty: 60 CAPSULE | Refills: 11 | Status: SHIPPED | OUTPATIENT
Start: 2018-04-06 | End: 2018-04-09 | Stop reason: DRUGHIGH

## 2018-04-05 NOTE — TELEPHONE ENCOUNTER
Received written orders from Dr. Bill instructing pt to hold the next 2 prograf doses then resume at 0.5 mg bid.  Pt contacted and instructed not to take prograf tonight or tomorrow morning.  Pt instructed to begin taking prograf the evening of 4/6/18 at 0.5 mg twice a day.  Patient will be notified of lab date and location when we find out if pt can have labs done in [place] on Saturday.  Patient verbalized understanding.

## 2018-04-05 NOTE — TELEPHONE ENCOUNTER
----- Message from Samantha Bill DO sent at 4/5/2018  4:38 PM CDT -----  Hold 2 doses of tac.  Resume at 0.5mg BID

## 2018-04-06 ENCOUNTER — TELEPHONE (OUTPATIENT)
Dept: TRANSPLANT | Facility: CLINIC | Age: 58
End: 2018-04-06

## 2018-04-06 DIAGNOSIS — B49 FUNGAL INFECTION: Primary | ICD-10-CM

## 2018-04-06 NOTE — TELEPHONE ENCOUNTER
----- Message from Darrick Wolfe MD sent at 4/5/2018  7:27 PM CDT -----  I got an email from pathology that there was mold in the CBC. They believe it is a contaminant but we should still get fungal blood cultures.     ----- Message -----  From: Samantha Bill DO  Sent: 4/5/2018   4:38 PM  To: Darrick Wolfe MD, Danielle BRICEÑO Do, RN    Hold 2 doses of tac.  Resume at 0.5mg BID

## 2018-04-06 NOTE — TELEPHONE ENCOUNTER
Received written orders from Dr. Wolfe to have patient go to labs for a fungal blood culture.  Patient scheduled previously for a tacro level on Saturday 4/7/18.  Fungal blood culture added to existing labs.  Pt has been notified of the above and verbalized understanding.

## 2018-04-07 ENCOUNTER — LAB VISIT (OUTPATIENT)
Dept: LAB | Facility: HOSPITAL | Age: 58
End: 2018-04-07
Payer: COMMERCIAL

## 2018-04-07 DIAGNOSIS — Z94.2 LUNG REPLACED BY TRANSPLANT: ICD-10-CM

## 2018-04-07 DIAGNOSIS — B49 FUNGAL INFECTION: ICD-10-CM

## 2018-04-07 LAB
ANION GAP SERPL CALC-SCNC: 8 MMOL/L
BUN SERPL-MCNC: 18 MG/DL
CALCIUM SERPL-MCNC: 9.8 MG/DL
CHLORIDE SERPL-SCNC: 104 MMOL/L
CO2 SERPL-SCNC: 29 MMOL/L
CREAT SERPL-MCNC: 1.1 MG/DL
EST. GFR  (AFRICAN AMERICAN): >60 ML/MIN/1.73 M^2
EST. GFR  (NON AFRICAN AMERICAN): >60 ML/MIN/1.73 M^2
GLUCOSE SERPL-MCNC: 134 MG/DL
POTASSIUM SERPL-SCNC: 5.1 MMOL/L
SODIUM SERPL-SCNC: 141 MMOL/L
TACROLIMUS BLD-MCNC: 22.1 NG/ML

## 2018-04-07 PROCEDURE — 80197 ASSAY OF TACROLIMUS: CPT

## 2018-04-07 PROCEDURE — 87103 BLOOD FUNGUS CULTURE: CPT

## 2018-04-07 PROCEDURE — 36415 COLL VENOUS BLD VENIPUNCTURE: CPT

## 2018-04-07 PROCEDURE — 80048 BASIC METABOLIC PNL TOTAL CA: CPT

## 2018-04-09 ENCOUNTER — HOSPITAL ENCOUNTER (OUTPATIENT)
Dept: RADIOLOGY | Facility: HOSPITAL | Age: 58
Discharge: HOME | End: 2018-04-09
Attending: INTERNAL MEDICINE
Payer: COMMERCIAL

## 2018-04-09 ENCOUNTER — OFFICE VISIT (OUTPATIENT)
Dept: TRANSPLANT | Facility: CLINIC | Age: 58
End: 2018-04-09
Payer: COMMERCIAL

## 2018-04-09 ENCOUNTER — PATIENT MESSAGE (OUTPATIENT)
Dept: TRANSPLANT | Facility: CLINIC | Age: 58
End: 2018-04-09

## 2018-04-09 ENCOUNTER — HOSPITAL ENCOUNTER (OUTPATIENT)
Dept: PULMONOLOGY | Facility: CLINIC | Age: 58
Discharge: HOME | End: 2018-04-09
Payer: COMMERCIAL

## 2018-04-09 VITALS
DIASTOLIC BLOOD PRESSURE: 72 MMHG | SYSTOLIC BLOOD PRESSURE: 128 MMHG | RESPIRATION RATE: 20 BRPM | HEART RATE: 85 BPM | OXYGEN SATURATION: 99 % | BODY MASS INDEX: 29.92 KG/M2 | TEMPERATURE: 98 F | HEIGHT: 70 IN | WEIGHT: 209 LBS

## 2018-04-09 DIAGNOSIS — Z94.2 LUNG REPLACED BY TRANSPLANT: ICD-10-CM

## 2018-04-09 DIAGNOSIS — D84.9 IMMUNOSUPPRESSION: ICD-10-CM

## 2018-04-09 DIAGNOSIS — T86.810 ACUTE REJECTION OF LUNG TRANSPLANT: Primary | ICD-10-CM

## 2018-04-09 DIAGNOSIS — Z79.2 PROPHYLACTIC ANTIBIOTIC: ICD-10-CM

## 2018-04-09 LAB
PRE FEV1 FVC: 81
PRE FEV1: 2.6
PRE FVC: 3.22
PREDICTED FEV1 FVC: 80
PREDICTED FEV1: 3.71
PREDICTED FVC: 4.57

## 2018-04-09 PROCEDURE — 71046 X-RAY EXAM CHEST 2 VIEWS: CPT | Mod: TC,FY

## 2018-04-09 PROCEDURE — 94010 BREATHING CAPACITY TEST: CPT | Mod: S$GLB,,, | Performed by: INTERNAL MEDICINE

## 2018-04-09 PROCEDURE — 71046 X-RAY EXAM CHEST 2 VIEWS: CPT | Mod: 26,,, | Performed by: RADIOLOGY

## 2018-04-09 PROCEDURE — 99999 PR PBB SHADOW E&M-EST. PATIENT-LVL III: CPT | Mod: PBBFAC,,, | Performed by: INTERNAL MEDICINE

## 2018-04-09 PROCEDURE — 99214 OFFICE O/P EST MOD 30 MIN: CPT | Mod: 25,S$GLB,, | Performed by: INTERNAL MEDICINE

## 2018-04-09 RX ORDER — TACROLIMUS 0.5 MG/1
0.5 CAPSULE ORAL DAILY
COMMUNITY
End: 2018-04-17 | Stop reason: SDUPTHER

## 2018-04-09 RX ORDER — TACROLIMUS 0.5 MG/1
0.5 CAPSULE ORAL DAILY
COMMUNITY
End: 2018-04-09 | Stop reason: DRUGHIGH

## 2018-04-09 NOTE — PROGRESS NOTES
LUNG TRANSPLANT RECIPIENT FOLLOW-UP     Reason for Visit: Follow-up after lung transplantation.                               Date of Transplant: 8/22/17     Reason for Transplant: Sarcoidosis with pulmonary hypertension     Type of Transplant: bilateral sequential lung     CMV Status: D+ / R-      Major Complications:   1. Left vocal cord dysfunction  2. 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 is a 57 y.o. year old male who presents for follow up. The patient was admitted for A2 rejection, received outpatient pulse dose steroids, which did not improve his symptoms, then was admitted to receive treatment in hospital, tolerated treatment well and was discharged. Pt states that he felt bad the first day after discharge, but then felt much better after that. Saw ENT, who stated he saw no nodules, but voice is improved since vocal cord injection. Pt has no limitations walking. Still c/o dry cough. No sick contacts and no recent infections. He feels well. No other complaints.   He is taking tacrolimus 0.5mg BID, Prednisone 5mg, Bactrim, Voriconazole, and Valcyte and tolerating these medications well.       Review of Systems   Constitutional: Negative for chills, fever, malaise/fatigue and weight loss.   HENT: Positive for congestion. Negative for ear pain, hearing loss, sinus pain and sore throat.    Eyes: Negative for blurred vision, double vision and photophobia.   Respiratory: Positive for cough (dry cough). Negative for sputum production, shortness of breath and wheezing.    Cardiovascular: Negative for chest pain, orthopnea and leg swelling.   Gastrointestinal: Negative for abdominal pain, constipation, diarrhea, heartburn, nausea and vomiting.   Genitourinary: Negative for flank pain, frequency and urgency.   Musculoskeletal: Negative for back pain, falls, joint pain and myalgias.   Skin: Negative for  "itching and rash.   Neurological: Negative for dizziness, focal weakness, seizures, loss of consciousness, weakness and headaches.   Endo/Heme/Allergies: Does not bruise/bleed easily.   Psychiatric/Behavioral: Negative for depression. The patient is not nervous/anxious.      /72 (BP Location: Right arm, Patient Position: Sitting, BP Method: Medium (Automatic))   Pulse 85   Temp 98.2 °F (36.8 °C) (Oral)   Resp 20   Ht 5' 10" (1.778 m)   Wt 94.8 kg (209 lb)   SpO2 99%   BMI 29.99 kg/m²     Physical Exam   Constitutional: He is oriented to person, place, and time and well-developed, well-nourished, and in no distress. No distress.   HENT:   Head: Normocephalic.   Eyes: No scleral icterus.   Neck: Normal range of motion. Neck supple.   Cardiovascular: Normal rate, regular rhythm and normal heart sounds.  Exam reveals no friction rub.    No murmur heard.  Pulmonary/Chest: Effort normal and breath sounds normal. No respiratory distress. He has no wheezes. He has no rales. He exhibits no tenderness.   Abdominal: Soft. There is no tenderness. There is no guarding.   Musculoskeletal: Normal range of motion. He exhibits no edema or tenderness.   Neurological: He is alert and oriented to person, place, and time.   Skin: Skin is warm and dry. No rash noted. He is not diaphoretic. No erythema. No pallor.   Psychiatric: Affect normal.   Vitals reviewed.    Labs:  cbc, cmp, tacrolimus Latest Ref Rng & Units 4/4/2018 4/7/2018 4/9/2018   TACROLIMUS LVL 5.0 - 15.0 ng/mL 24.6(H) 22.1(H) -   WHITE BLOOD CELL COUNT 3.90 - 12.70 K/uL 8.11 - 9.56   RBC 4.60 - 6.20 M/uL 3.71(L) - 3.98(L)   HEMOGLOBIN 14.0 - 18.0 g/dL 10.7(L) - 11.5(L)   HEMATOCRIT 40.0 - 54.0 % 35.6(L) - 37.4(L)   MCV 82 - 98 fL 96 - 94   MCH 27.0 - 31.0 pg 28.8 - 28.9   MCHC 32.0 - 36.0 g/dL 30.1(L) - 30.7(L)   RDW 11.5 - 14.5 % 12.7 - 12.7   PLATELETS 150 - 350 K/uL 667(H) - 648(H)   MPV 9.2 - 12.9 fL 8.4(L) - 8.3(L)   GRAN # 1.8 - 7.7 K/uL - - -   LYMPH # " 1.0 - 4.8 K/uL - - CANCELED   MONO # 0.3 - 1.0 K/uL - - CANCELED   EOSINOPHIL% 0.0 - 8.0 % 2.0 - 2.0   BASOPHIL% 0.0 - 1.9 % 4.0(H) - 0.0   DIFFERENTIAL METHOD - Manual - Manual   SODIUM 136 - 145 mmol/L 142 141 139   POTASSIUM 3.5 - 5.1 mmol/L 4.6 5.1 4.7   CHLORIDE 95 - 110 mmol/L 105 104 103   CO2 23 - 29 mmol/L 27 29 28   GLUCOSE 70 - 110 mg/dL 137(H) 134(H) 148(H)   BUN BLD 6 - 20 mg/dL 20 18 19   CREATININE 0.5 - 1.4 mg/dL 1.0 1.1 0.9   CALCIUM 8.7 - 10.5 mg/dL 8.9 9.8 9.4   PROTEIN TOTAL 6.0 - 8.4 g/dL - - 6.3   ALBUMIN 3.5 - 5.2 g/dL - - 3.9   BILIRUBIN TOTAL 0.1 - 1.0 mg/dL - - 0.4   ALK PHOS 55 - 135 U/L - - 80   AST 10 - 40 U/L - - 20   ALT 10 - 44 U/L - - 16   ANION GAP 8 - 16 mmol/L 10 8 8   EGFR IF AFRICAN AMERICAN >60 mL/min/1.73 m:2 >60.0 >60.0 >60.0   EGFR IF NON-AFRICAN AMERICAN >60 mL/min/1.73 m:2 >60.0 >60.0 >60.0     Pulmonary Function Tests 4/9/2018 3/19/2018 2/19/2018 1/16/2018 12/18/2017 11/16/2017 10/26/2017   FVC 3.22 3.21 3.4 3.5 3.3 3 2.71   FEV1 2.6 2.61 2.79 2.83 2.69 2.53 2.41   TLC (liters) - - - - - - -   DLCO (ml/mmHg sec) - - - - - - -   FVC% 70 70 74 76 72 65 59   FEV1% 70 70 75 76 72 67 64   FEF 25-75 2.47 2.53 2.77 2.82 2.62 2.51 2.73   FEF 25-75% 65 67 73 74 69 65 71   TLC% - - - - - - -   RV - - - - - - -   RV% - - - - - - -   DLCO% - - - - - - -       Imaging:  Results for orders placed during the hospital encounter of 04/09/18   X-Ray Chest PA And Lateral    Narrative EXAMINATION:  XR CHEST PA AND LATERAL    CLINICAL HISTORY:  s/p bilateral lung transplant; Lung transplant status    TECHNIQUE:  PA and lateral views of the chest were performed.    COMPARISON:  None 03/21/2018    FINDINGS:  Postoperative changes identified.  The heart is not enlarged.  Subsegmental atelectatic changes seen at the left lung base and the costophrenic angles are slightly blunted posteriorly.  The upper lung fields are clear      Impression See above      Electronically signed by: Sukhi Crooks,  MD  Date:    04/09/2018  Time:    08:18       Assessment:  1. Acute rejection of lung transplant    2. Lung replaced by transplant    3. Prophylactic antibiotic    4. Immunosuppression      Plan:   1. Was recently hospitalized for A2 rejection. Received pulse dose steroids as outpatient, then thymoglobulin. Pt is improved since discharge.      - PFTs stable      - Pt asymptomatic.      - Proceed with bronchoscopy 4/19/18.      - Continue to monitor     2. Continue immunosuppressants and prophylactic Abx    3. Continue Bactrim, Voriconazole, Valcyte     4. On Tacrolimus 0.5mg BID, prednisone 5mg.       -Tacrolimus level WNL today. Continue tacrolimus BID. Will repeat tacrolimus level on Wednesday and adjust levels accordingly.       Bryan Edgar MD  [facility] Fellow     Attending Note:    I have seen and evaluated the patient with the fellow. Their note reflects the content of our discussion and my plan of care.      Darrick Wolfe MD  Pulmonary/Critical Care Medicine

## 2018-04-16 ENCOUNTER — LAB VISIT (OUTPATIENT)
Dept: LAB | Facility: HOSPITAL | Age: 58
End: 2018-04-16
Attending: INTERNAL MEDICINE
Payer: COMMERCIAL

## 2018-04-16 DIAGNOSIS — Z94.2 LUNG REPLACED BY TRANSPLANT: ICD-10-CM

## 2018-04-16 LAB
ANION GAP SERPL CALC-SCNC: 8 MMOL/L
BUN SERPL-MCNC: 25 MG/DL
CALCIUM SERPL-MCNC: 9.3 MG/DL
CHLORIDE SERPL-SCNC: 105 MMOL/L
CO2 SERPL-SCNC: 29 MMOL/L
CREAT SERPL-MCNC: 1 MG/DL
EST. GFR  (AFRICAN AMERICAN): >60 ML/MIN/1.73 M^2
EST. GFR  (NON AFRICAN AMERICAN): >60 ML/MIN/1.73 M^2
GLUCOSE SERPL-MCNC: 134 MG/DL
POTASSIUM SERPL-SCNC: 4.8 MMOL/L
SODIUM SERPL-SCNC: 142 MMOL/L

## 2018-04-16 PROCEDURE — 80197 ASSAY OF TACROLIMUS: CPT

## 2018-04-16 PROCEDURE — 80048 BASIC METABOLIC PNL TOTAL CA: CPT

## 2018-04-16 PROCEDURE — 36415 COLL VENOUS BLD VENIPUNCTURE: CPT | Mod: PO

## 2018-04-17 DIAGNOSIS — Z94.2 LUNG REPLACED BY TRANSPLANT: Primary | ICD-10-CM

## 2018-04-17 LAB — TACROLIMUS BLD-MCNC: 19.2 NG/ML

## 2018-04-17 RX ORDER — TACROLIMUS 0.5 MG/1
0.5 CAPSULE ORAL DAILY
Qty: 30 CAPSULE | Refills: 11 | Status: SHIPPED | OUTPATIENT
Start: 2018-04-17 | End: 2018-05-07 | Stop reason: SDUPTHER

## 2018-04-17 NOTE — TELEPHONE ENCOUNTER
Received written orders from Dr. Bill to decrease prograf dose to 0.5 mg daily from 0.5 mg bid.  Check vori level with next lab draw.  Patient contacted and notified to decrease prograf to 0.5 mg daily.  Pt will have labs drawn on Friday 4/20/18 at the [place] location for a tacrolimus and voriconazole level.  Patient contacted and notified of the above instructions.  Patient verbalized understanding and agreed to have labs drawn in [place] on 4/20/18.

## 2018-04-17 NOTE — TELEPHONE ENCOUNTER
----- Message from Samantha Bill DO sent at 4/17/2018 11:24 AM CDT -----  Decrease tac to 0.5mg daily.  Check vori level with next lab draw.

## 2018-04-19 ENCOUNTER — SURGERY (OUTPATIENT)
Age: 58
End: 2018-04-19

## 2018-04-19 ENCOUNTER — HOSPITAL ENCOUNTER (OUTPATIENT)
Facility: HOSPITAL | Age: 58
Discharge: HOME | End: 2018-04-19
Attending: INTERNAL MEDICINE | Admitting: INTERNAL MEDICINE
Payer: COMMERCIAL

## 2018-04-19 VITALS
TEMPERATURE: 98 F | WEIGHT: 205 LBS | OXYGEN SATURATION: 93 % | HEART RATE: 81 BPM | RESPIRATION RATE: 18 BRPM | BODY MASS INDEX: 28.7 KG/M2 | HEIGHT: 71 IN | DIASTOLIC BLOOD PRESSURE: 81 MMHG | SYSTOLIC BLOOD PRESSURE: 128 MMHG

## 2018-04-19 DIAGNOSIS — T86.819 COMPLICATION OF TRANSPLANTED LUNG, UNSPECIFIED COMPLICATION: Primary | ICD-10-CM

## 2018-04-19 LAB
APPEARANCE FLD: NORMAL
BODY FLD TYPE: NORMAL
COLOR FLD: COLORLESS
EOSINOPHIL NFR FLD MANUAL: 1 %
LYMPHOCYTES NFR FLD MANUAL: 5 %
MONOS+MACROS NFR FLD MANUAL: 30 %
NEUTROPHILS NFR FLD MANUAL: 64 %
POCT GLUCOSE: 127 MG/DL (ref 70–110)
POCT GLUCOSE: 129 MG/DL (ref 70–110)
WBC # FLD: 111 /CU MM

## 2018-04-19 PROCEDURE — 31628 BRONCHOSCOPY/LUNG BX EACH: CPT | Performed by: INTERNAL MEDICINE

## 2018-04-19 PROCEDURE — 87205 SMEAR GRAM STAIN: CPT

## 2018-04-19 PROCEDURE — 87496 CYTOMEG DNA AMP PROBE: CPT

## 2018-04-19 PROCEDURE — 31624 DX BRONCHOSCOPE/LAVAGE: CPT | Mod: 59,RT,, | Performed by: INTERNAL MEDICINE

## 2018-04-19 PROCEDURE — 88313 SPECIAL STAINS GROUP 2: CPT | Mod: 26,,,

## 2018-04-19 PROCEDURE — 31623 DX BRONCHOSCOPE/BRUSH: CPT | Performed by: INTERNAL MEDICINE

## 2018-04-19 PROCEDURE — 87102 FUNGUS ISOLATION CULTURE: CPT | Mod: 59

## 2018-04-19 PROCEDURE — 87015 SPECIMEN INFECT AGNT CONCNTJ: CPT

## 2018-04-19 PROCEDURE — 87305 ASPERGILLUS AG IA: CPT

## 2018-04-19 PROCEDURE — 31628 BRONCHOSCOPY/LUNG BX EACH: CPT | Mod: RT,,, | Performed by: INTERNAL MEDICINE

## 2018-04-19 PROCEDURE — 89051 BODY FLUID CELL COUNT: CPT

## 2018-04-19 PROCEDURE — 31624 DX BRONCHOSCOPE/LAVAGE: CPT | Performed by: INTERNAL MEDICINE

## 2018-04-19 PROCEDURE — 82962 GLUCOSE BLOOD TEST: CPT

## 2018-04-19 PROCEDURE — 87206 SMEAR FLUORESCENT/ACID STAI: CPT

## 2018-04-19 PROCEDURE — 31623 DX BRONCHOSCOPE/BRUSH: CPT | Mod: 59,LT,, | Performed by: INTERNAL MEDICINE

## 2018-04-19 PROCEDURE — 88305 TISSUE EXAM BY PATHOLOGIST: CPT

## 2018-04-19 PROCEDURE — 63600175 PHARM REV CODE 636 W HCPCS: Performed by: INTERNAL MEDICINE

## 2018-04-19 PROCEDURE — 27201011 HC FORCEPS DISPOSABLE: Performed by: INTERNAL MEDICINE

## 2018-04-19 PROCEDURE — 99153 MOD SED SAME PHYS/QHP EA: CPT | Performed by: INTERNAL MEDICINE

## 2018-04-19 PROCEDURE — 87070 CULTURE OTHR SPECIMN AEROBIC: CPT | Mod: 59

## 2018-04-19 PROCEDURE — 99152 MOD SED SAME PHYS/QHP 5/>YRS: CPT | Performed by: INTERNAL MEDICINE

## 2018-04-19 PROCEDURE — 88305 TISSUE EXAM BY PATHOLOGIST: CPT | Mod: 26,,,

## 2018-04-19 PROCEDURE — 87486 CHLMYD PNEUM DNA AMP PROBE: CPT

## 2018-04-19 PROCEDURE — 88312 SPECIAL STAINS GROUP 1: CPT | Mod: 26,,,

## 2018-04-19 PROCEDURE — 87116 MYCOBACTERIA CULTURE: CPT

## 2018-04-19 PROCEDURE — 25000003 PHARM REV CODE 250: Performed by: INTERNAL MEDICINE

## 2018-04-19 RX ORDER — MIDAZOLAM HYDROCHLORIDE 5 MG/ML
INJECTION INTRAMUSCULAR; INTRAVENOUS CODE/TRAUMA/SEDATION MEDICATION
Status: COMPLETED | OUTPATIENT
Start: 2018-04-19 | End: 2018-04-19

## 2018-04-19 RX ORDER — LIDOCAINE HYDROCHLORIDE 20 MG/ML
INJECTION, SOLUTION INFILTRATION; PERINEURAL CODE/TRAUMA/SEDATION MEDICATION
Status: COMPLETED | OUTPATIENT
Start: 2018-04-19 | End: 2018-04-19

## 2018-04-19 RX ORDER — LIDOCAINE HYDROCHLORIDE 10 MG/ML
INJECTION INFILTRATION; PERINEURAL CODE/TRAUMA/SEDATION MEDICATION
Status: COMPLETED | OUTPATIENT
Start: 2018-04-19 | End: 2018-04-19

## 2018-04-19 RX ORDER — FENTANYL CITRATE 50 UG/ML
INJECTION, SOLUTION INTRAMUSCULAR; INTRAVENOUS CODE/TRAUMA/SEDATION MEDICATION
Status: COMPLETED | OUTPATIENT
Start: 2018-04-19 | End: 2018-04-19

## 2018-04-19 RX ADMIN — FENTANYL CITRATE 100 MCG: 50 INJECTION, SOLUTION INTRAMUSCULAR; INTRAVENOUS at 08:04

## 2018-04-19 RX ADMIN — LIDOCAINE HYDROCHLORIDE 2 ML: 20 INJECTION, SOLUTION INFILTRATION; PERINEURAL at 08:04

## 2018-04-19 RX ADMIN — MIDAZOLAM 4 MG: 5 INJECTION INTRAMUSCULAR; INTRAVENOUS at 08:04

## 2018-04-19 RX ADMIN — LIDOCAINE HYDROCHLORIDE 2 ML: 10 INJECTION, SOLUTION INFILTRATION; PERINEURAL at 08:04

## 2018-04-19 RX ADMIN — TOPICAL ANESTHETIC 0.5 ML: 200 SPRAY DENTAL; PERIODONTAL at 08:04

## 2018-04-19 NOTE — DISCHARGE INSTRUCTIONS
Flexible Bronchoscopy  A flexible bronchoscopy is an exam of the airways of your lungs. A thin, flexible tube called a bronchoscope is used. It has a light and small camera that allow the healthcare provider to view your airways.    Before your test  · Follow your healthcare provider's instructions carefully. If you dont, the exam may be canceled. Or you may need to take it again.  · If you are taking blood-thinning medicine, ask your healthcare provider if you should stop taking the medicine before this test.  · Have no food or drink for at least 8 hours before the test. Also, avoid smoking for 24 hours before the test.  · You will need to remove any dentures or removable devices from your mouth.  · Right before the test, you will be given sedating medicines to help you relax. The medicine may be given by an IV (intravenously) into one of your veins. In addition, your nose and throat may be numbed with a special spray to help prevent gagging and coughing.  · If you are having this test as an outpatient, make sure you have an adult friend or family member to drive you home.  During your test  Bronchoscopy takes 45 to 60 minutes and includes the following steps:  · You may be given medicine (anesthesia) so that you are unconscious or asleep during the procedure.  · The healthcare provider inserts the tube into your nose or mouth.  · If you have not been given anesthesia, you might feel a gagging sensation. To help ease this feeling, you will be told to swallow or take deep breaths. Your airway will remain open even with the tube in place. But you wont be able to talk.  · The provider checks your breathing passage. He or she may also remove tiny tissue samples for biopsy.  After your test  · You may have a mild sore throat or cough. Your voice may also be hoarse.  · Don't eat or drink until the anesthesia wears off.  · If you had a biopsy, you might see traces of blood being coughed up.  When to call your  healthcare provider  Call your healthcare provider right away if you have any of the following:  · Shortness of breath  · Chest pain  · Bleeding from your nose or throat  · Coughing up a large amount of blood  · A fever above 100.4°F (38°C) for more than 24 hours  Call 911  Call 911 if you have:  · Chest pain  · Severe shortness of breath   Date Last Reviewed: 12/1/2016  © 7876-0833 [employer]. [address] [place] [address], [place] [address]. All rights reserved. This information is not intended as a substitute for professional medical care. Always follow your healthcare professional's instructions.

## 2018-04-19 NOTE — DISCHARGE SUMMARY
[facility]  Lung Transplant  Discharge Summary      Patient Name: Martin Hendrix Jr.  MRN: [id number]  Admission Date: 4/19/2018  Hospital Length of Stay: 0 days  Discharge Date and Time: 04/19/2018 8:26 AM  Attending Physician: Darrick Wolfe MD   Discharging Provider: Samantha Bill DO  Primary Care Provider: Sukhi Dunham Jr, MD     HPI: No notes on file    Procedure(s) (LRB):  flexible bronchoscopy with tissue biopsy CPT 48746 (N/A)     Hospital Course: Patient underwent successful surveillance bronchoscopy without apparent complications        Significant Diagnostic Studies: Bronchoscopy: see full bronchoscopy report    Pending Diagnostic Studies:     Procedure Component Value Units Date/Time    X-Ray Chest AP Portable [[id number]]     Order Status:  Sent Lab Status:  No result         Final Active Diagnoses:    Diagnosis Date Noted POA    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     aspirin 81 MG EC tablet  Commonly known as:  ECOTRIN  Take 1 tablet (81 mg total) by mouth once daily.     blood sugar diagnostic Strp  To check BG 4 times daily, to use with insurance preferred meter (one touch verio if possible)     calcium-vitamin D3 600 mg(1,500mg) -200 unit Tab  Commonly known as:  CALCIUM 600 WITH VITAMIN D3  Take 1 tablet by mouth 2 (two) times daily.     famotidine 20 MG tablet  Commonly known as:  PEPCID  Take 1 tablet (20 mg total) by mouth 2 (two) times daily.     fluticasone 50 mcg/actuation nasal spray  Commonly known as:  FLONASE  1 spray by Each Nare route once daily.     folic acid 1 MG tablet  Commonly known as:   "FOLVITE  Take 1 tablet (1 mg total) by mouth once daily.     furosemide 40 MG tablet  Commonly known as:  LASIX  Take 1 tablet (40 mg total) by mouth daily as needed.     insulin degludec 200 unit/mL (3 mL) Inpn  Commonly known as:  TRESIBA FLEXTOUCH U-200  Inject 18 Units into the skin every evening.     insulin lispro 100 unit/mL Inpn pen  Commonly known as:  HumaLOG KwikPen Insulin  12 units with meals plus correction scale, max TDD 60 units daily     lancets Misc  To check BG 4 times daily, to use with insurance preferred meter (one touch verio if possible)     magnesium oxide 400 mg tablet  Commonly known as:  MAG-OX  Take 1 tablet (400 mg total) by mouth 3 (three) times daily.     metFORMIN 500 MG 24 hr tablet  Commonly known as:  GLUCOPHAGE-XR  Take 2 tablets (1,000 mg total) by mouth 2 (two) times daily with meals.     multivitamin per tablet  Commonly known as:  THERAGRAN  Take 1 tablet by mouth once daily.     pen needle, diabetic 32 gauge x 5/32" Ndle  Commonly known as:  BD ULTRA-FINE JIM PEN NEEDLES  Uses 4 times daily, on multiple daily insulin injections     pravastatin 20 MG tablet  Commonly known as:  PRAVACHOL  Take 1 tablet (20 mg total) by mouth once daily.     predniSONE 10 MG tablet  Commonly known as:  DELTASONE  Take 5 mg by mouth once daily.     sulfamethoxazole-trimethoprim 800-160mg 800-160 mg Tab  Commonly known as:  BACTRIM DS  Take 1 tablet by mouth every Mon, Wed, Fri.     tacrolimus 0.5 MG Cap  Commonly known as:  PROGRAF  Take 1 capsule (0.5 mg total) by mouth once daily.     valGANciclovir 450 mg Tab  Commonly known as:  VALCYTE  Take 1 tablet (450 mg total) by mouth 2 (two) times daily.     voriconazole 200 MG Tab  Commonly known as:  VFEND  Take 1 tablet (200 mg total) by mouth 2 (two) times daily.        ASK your doctor about these medications    ACCU-CHEK DEANNE PLUS METER [facility]  Generic drug:  blood-glucose meter  USE AS DIRECTED          Patient Instructions:     Diet general "     Activity as tolerated     Call MD for:  temperature >101     Call MD for:  coughing up blood greater than 3 tablespoons in volume     Call MD for:  chest pain     Call MD for:  difficulty breathing or shortness of breath     Call MD for:  development of yellow/green sputum       Follow Up:  Follow-up Information     Darrick Wolfe MD.    Specialty:  Transplant  Why:  As needed  Contact information:  [address]  [facility] [address]  [phone]                   Samantha Bill, DO  Lung Transplant  [facility]

## 2018-04-19 NOTE — H&P (VIEW-ONLY)
LUNG TRANSPLANT RECIPIENT FOLLOW-UP     Reason for Visit: Follow-up after lung transplantation.                               Date of Transplant: 8/22/17     Reason for Transplant: Sarcoidosis with pulmonary hypertension     Type of Transplant: bilateral sequential lung     CMV Status: D+ / R-      Major Complications:   1. Left vocal cord dysfunction  2. 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 is a 57 y.o. year old male who presents for follow up. The patient was admitted for A2 rejection, received outpatient pulse dose steroids, which did not improve his symptoms, then was admitted to receive treatment in hospital, tolerated treatment well and was discharged. Pt states that he felt bad the first day after discharge, but then felt much better after that. Saw ENT, who stated he saw no nodules, but voice is improved since vocal cord injection. Pt has no limitations walking. Still c/o dry cough. No sick contacts and no recent infections. He feels well. No other complaints.   He is taking tacrolimus 0.5mg BID, Prednisone 5mg, Bactrim, Voriconazole, and Valcyte and tolerating these medications well.       Review of Systems   Constitutional: Negative for chills, fever, malaise/fatigue and weight loss.   HENT: Positive for congestion. Negative for ear pain, hearing loss, sinus pain and sore throat.    Eyes: Negative for blurred vision, double vision and photophobia.   Respiratory: Positive for cough (dry cough). Negative for sputum production, shortness of breath and wheezing.    Cardiovascular: Negative for chest pain, orthopnea and leg swelling.   Gastrointestinal: Negative for abdominal pain, constipation, diarrhea, heartburn, nausea and vomiting.   Genitourinary: Negative for flank pain, frequency and urgency.   Musculoskeletal: Negative for back pain, falls, joint pain and myalgias.   Skin: Negative for  "itching and rash.   Neurological: Negative for dizziness, focal weakness, seizures, loss of consciousness, weakness and headaches.   Endo/Heme/Allergies: Does not bruise/bleed easily.   Psychiatric/Behavioral: Negative for depression. The patient is not nervous/anxious.      /72 (BP Location: Right arm, Patient Position: Sitting, BP Method: Medium (Automatic))   Pulse 85   Temp 98.2 °F (36.8 °C) (Oral)   Resp 20   Ht 5' 10" (1.778 m)   Wt 94.8 kg (209 lb)   SpO2 99%   BMI 29.99 kg/m²     Physical Exam   Constitutional: He is oriented to person, place, and time and well-developed, well-nourished, and in no distress. No distress.   HENT:   Head: Normocephalic.   Eyes: No scleral icterus.   Neck: Normal range of motion. Neck supple.   Cardiovascular: Normal rate, regular rhythm and normal heart sounds.  Exam reveals no friction rub.    No murmur heard.  Pulmonary/Chest: Effort normal and breath sounds normal. No respiratory distress. He has no wheezes. He has no rales. He exhibits no tenderness.   Abdominal: Soft. There is no tenderness. There is no guarding.   Musculoskeletal: Normal range of motion. He exhibits no edema or tenderness.   Neurological: He is alert and oriented to person, place, and time.   Skin: Skin is warm and dry. No rash noted. He is not diaphoretic. No erythema. No pallor.   Psychiatric: Affect normal.   Vitals reviewed.    Labs:  cbc, cmp, tacrolimus Latest Ref Rng & Units 4/4/2018 4/7/2018 4/9/2018   TACROLIMUS LVL 5.0 - 15.0 ng/mL 24.6(H) 22.1(H) -   WHITE BLOOD CELL COUNT 3.90 - 12.70 K/uL 8.11 - 9.56   RBC 4.60 - 6.20 M/uL 3.71(L) - 3.98(L)   HEMOGLOBIN 14.0 - 18.0 g/dL 10.7(L) - 11.5(L)   HEMATOCRIT 40.0 - 54.0 % 35.6(L) - 37.4(L)   MCV 82 - 98 fL 96 - 94   MCH 27.0 - 31.0 pg 28.8 - 28.9   MCHC 32.0 - 36.0 g/dL 30.1(L) - 30.7(L)   RDW 11.5 - 14.5 % 12.7 - 12.7   PLATELETS 150 - 350 K/uL 667(H) - 648(H)   MPV 9.2 - 12.9 fL 8.4(L) - 8.3(L)   GRAN # 1.8 - 7.7 K/uL - - -   LYMPH # " 1.0 - 4.8 K/uL - - CANCELED   MONO # 0.3 - 1.0 K/uL - - CANCELED   EOSINOPHIL% 0.0 - 8.0 % 2.0 - 2.0   BASOPHIL% 0.0 - 1.9 % 4.0(H) - 0.0   DIFFERENTIAL METHOD - Manual - Manual   SODIUM 136 - 145 mmol/L 142 141 139   POTASSIUM 3.5 - 5.1 mmol/L 4.6 5.1 4.7   CHLORIDE 95 - 110 mmol/L 105 104 103   CO2 23 - 29 mmol/L 27 29 28   GLUCOSE 70 - 110 mg/dL 137(H) 134(H) 148(H)   BUN BLD 6 - 20 mg/dL 20 18 19   CREATININE 0.5 - 1.4 mg/dL 1.0 1.1 0.9   CALCIUM 8.7 - 10.5 mg/dL 8.9 9.8 9.4   PROTEIN TOTAL 6.0 - 8.4 g/dL - - 6.3   ALBUMIN 3.5 - 5.2 g/dL - - 3.9   BILIRUBIN TOTAL 0.1 - 1.0 mg/dL - - 0.4   ALK PHOS 55 - 135 U/L - - 80   AST 10 - 40 U/L - - 20   ALT 10 - 44 U/L - - 16   ANION GAP 8 - 16 mmol/L 10 8 8   EGFR IF AFRICAN AMERICAN >60 mL/min/1.73 m:2 >60.0 >60.0 >60.0   EGFR IF NON-AFRICAN AMERICAN >60 mL/min/1.73 m:2 >60.0 >60.0 >60.0     Pulmonary Function Tests 4/9/2018 3/19/2018 2/19/2018 1/16/2018 12/18/2017 11/16/2017 10/26/2017   FVC 3.22 3.21 3.4 3.5 3.3 3 2.71   FEV1 2.6 2.61 2.79 2.83 2.69 2.53 2.41   TLC (liters) - - - - - - -   DLCO (ml/mmHg sec) - - - - - - -   FVC% 70 70 74 76 72 65 59   FEV1% 70 70 75 76 72 67 64   FEF 25-75 2.47 2.53 2.77 2.82 2.62 2.51 2.73   FEF 25-75% 65 67 73 74 69 65 71   TLC% - - - - - - -   RV - - - - - - -   RV% - - - - - - -   DLCO% - - - - - - -       Imaging:  Results for orders placed during the hospital encounter of 04/09/18   X-Ray Chest PA And Lateral    Narrative EXAMINATION:  XR CHEST PA AND LATERAL    CLINICAL HISTORY:  s/p bilateral lung transplant; Lung transplant status    TECHNIQUE:  PA and lateral views of the chest were performed.    COMPARISON:  None 03/21/2018    FINDINGS:  Postoperative changes identified.  The heart is not enlarged.  Subsegmental atelectatic changes seen at the left lung base and the costophrenic angles are slightly blunted posteriorly.  The upper lung fields are clear      Impression See above      Electronically signed by: Sukhi Crooks,  MD  Date:    04/09/2018  Time:    08:18       Assessment:  1. Acute rejection of lung transplant    2. Lung replaced by transplant    3. Prophylactic antibiotic    4. Immunosuppression      Plan:   1. Was recently hospitalized for A2 rejection. Received pulse dose steroids as outpatient, then thymoglobulin. Pt is improved since discharge.      - PFTs stable      - Pt asymptomatic.      - Proceed with bronchoscopy 4/19/18.      - Continue to monitor     2. Continue immunosuppressants and prophylactic Abx    3. Continue Bactrim, Voriconazole, Valcyte     4. On Tacrolimus 0.5mg BID, prednisone 5mg.       -Tacrolimus level WNL today. Continue tacrolimus BID. Will repeat tacrolimus level on Wednesday and adjust levels accordingly.       Bryan Edgar MD  [facility] Fellow     Attending Note:    I have seen and evaluated the patient with the fellow. Their note reflects the content of our discussion and my plan of care.      Darrick Wolfe MD  Pulmonary/Critical Care Medicine

## 2018-04-19 NOTE — PROGRESS NOTES
Notified irwin celestin of xray. Irwin Celestin read the xray and stated the pt is ready for discharge.

## 2018-04-19 NOTE — SEDATION DOCUMENTATION
Moderate concious sedation was performed and cardiorespiratory functions were monitored the entire procedure by Any Kraus RN.  Sedation began at 801am  and concluded at 831am.

## 2018-04-19 NOTE — SEDATION DOCUMENTATION
RML BAL obtained. 90cc NS inserted with a return of 45cc. Right Main brushed obtained. RLL biopsy x 10 obtained. Patient tolerated well.

## 2018-04-20 ENCOUNTER — LAB VISIT (OUTPATIENT)
Dept: LAB | Facility: HOSPITAL | Age: 58
End: 2018-04-20
Attending: INTERNAL MEDICINE
Payer: COMMERCIAL

## 2018-04-20 DIAGNOSIS — Z94.2 LUNG REPLACED BY TRANSPLANT: ICD-10-CM

## 2018-04-20 LAB
ANION GAP SERPL CALC-SCNC: 6 MMOL/L
BUN SERPL-MCNC: 23 MG/DL
CALCIUM SERPL-MCNC: 9.5 MG/DL
CHLORIDE SERPL-SCNC: 105 MMOL/L
CO2 SERPL-SCNC: 31 MMOL/L
CREAT SERPL-MCNC: 1.1 MG/DL
ENTEROVIRUS: NOT DETECTED
EST. GFR  (AFRICAN AMERICAN): >60 ML/MIN/1.73 M^2
EST. GFR  (NON AFRICAN AMERICAN): >60 ML/MIN/1.73 M^2
GALACTOMANNAN AG SPEC-ACNC: <0.5 INDEX
GLUCOSE SERPL-MCNC: 132 MG/DL
HUMAN BOCAVIRUS: NOT DETECTED
HUMAN CORONAVIRUS, COMMON COLD VIRUS: NOT DETECTED
INFLUENZA A - H1N1-09: NOT DETECTED
LEGIONELLA PNEUMOPHILA: NOT DETECTED
MORAXELLA CATARRHALIS: NOT DETECTED
PARAINFLUENZA: NOT DETECTED
POTASSIUM SERPL-SCNC: 4.7 MMOL/L
RVP - ADENOVIRUS: NOT DETECTED
RVP - HUMAN METAPNEUMOVIRUS (HMPV): NOT DETECTED
RVP - INFLUENZA A: NOT DETECTED
RVP - INFLUENZA B: NOT DETECTED
RVP - RESPIRATORY SYNCTIAL VIRUS (RSV) A: NOT DETECTED
RVP - RESPIRATORY VIRAL PANEL, SOURCE: NORMAL
RVP - RHINOVIRUS: NOT DETECTED
SODIUM SERPL-SCNC: 142 MMOL/L
TEM - ACINETOBACTER BAUMANNII: NOT DETECTED
TEM - BORDETELLA PERTUSSIS: NOT DETECTED
TEM - CHLAMYDOPHILA PNEUMONIAE: NOT DETECTED
TEM - KLEBSIELLA PNEUMONIAE: NOT DETECTED
TEM - MRSA: NOT DETECTED
TEM - MYCOPLASMA PNEUMONIAE: NOT DETECTED
TEM - NEISSERIA MENINGITIDIS: NOT DETECTED
TEM - PANTON-VALENTINE: NOT DETECTED
TEM - PSEUDOMONAS AERUGINOSA: NOT DETECTED
TEM - STAPHYLOCOCCUS AUREUS: NOT DETECTED
TEM - STREPTOCOCCUS PNEUMONIAE: NOT DETECTED
TEM - STREPTOCOCCUS PYOGENES A: NOT DETECTED
TEM- HAEMOPHILUS INFLUENZAE B: NOT DETECTED
TEM- HAEMOPHILUS INFLUENZAE: NOT DETECTED

## 2018-04-20 PROCEDURE — 80048 BASIC METABOLIC PNL TOTAL CA: CPT

## 2018-04-20 PROCEDURE — 80197 ASSAY OF TACROLIMUS: CPT

## 2018-04-20 PROCEDURE — 80299 QUANTITATIVE ASSAY DRUG: CPT

## 2018-04-20 PROCEDURE — 36415 COLL VENOUS BLD VENIPUNCTURE: CPT | Mod: PO

## 2018-04-21 LAB
BACTERIA SPEC AEROBE CULT: NORMAL
BACTERIA SPEC AEROBE CULT: NORMAL
CMV DNA SPEC QL NAA+PROBE: NOT DETECTED
GRAM STN SPEC: NORMAL
SPECIMEN SOURCE: NORMAL
TACROLIMUS BLD-MCNC: 11.6 NG/ML

## 2018-04-24 LAB
FUNGUS SPEC CULT: NORMAL
FUNGUS SPEC CULT: NORMAL

## 2018-04-25 LAB
ACID FAST MOD KINY STN SPEC: NORMAL
ACID FAST MOD KINY STN SPEC: NORMAL
MYCOBACTERIUM SPEC QL CULT: NORMAL
MYCOBACTERIUM SPEC QL CULT: NORMAL
VORICONAZOLE SERPL-MCNC: 1.6 MCG/ML

## 2018-04-26 ENCOUNTER — TELEPHONE (OUTPATIENT)
Dept: TRANSPLANT | Facility: CLINIC | Age: 58
End: 2018-04-26

## 2018-04-26 DIAGNOSIS — T86.810 ACUTE REJECTION OF LUNG TRANSPLANT: Primary | ICD-10-CM

## 2018-04-26 DIAGNOSIS — K21.9 GASTROESOPHAGEAL REFLUX DISEASE, ESOPHAGITIS PRESENCE NOT SPECIFIED: ICD-10-CM

## 2018-04-26 NOTE — TELEPHONE ENCOUNTER
Received vvorb from Dr. Wolfe to schedule patient to see GI for a PH probe study.  Contacted patient and explained the reason for the study and what the study is for.  Patient verbalized understanding and had no further questions at this time.  Orders entered and submitted for scheduling.

## 2018-04-30 ENCOUNTER — PATIENT MESSAGE (OUTPATIENT)
Dept: TRANSPLANT | Facility: CLINIC | Age: 58
End: 2018-04-30

## 2018-05-03 RX ORDER — MYCOPHENOLATE MOFETIL 250 MG/1
500 CAPSULE ORAL 2 TIMES DAILY
Qty: 120 CAPSULE | Refills: 11 | Status: SHIPPED | OUTPATIENT
Start: 2018-05-03 | End: 2018-05-11 | Stop reason: SDUPTHER

## 2018-05-03 NOTE — TELEPHONE ENCOUNTER
Received vvorb from Dr. Wolfe during medication review to restart patient on Cellcept 500 mg bid.  My [employer] message sent to patient to notify him to restart the above medication at 500 mg twice a day.  We will check labs at patient's next clinic visit.  Patient responded stating understanding and will begin this evening.

## 2018-05-07 ENCOUNTER — HOSPITAL ENCOUNTER (OUTPATIENT)
Dept: PULMONOLOGY | Facility: CLINIC | Age: 58
Discharge: HOME | End: 2018-05-07
Payer: COMMERCIAL

## 2018-05-07 ENCOUNTER — HOSPITAL ENCOUNTER (OUTPATIENT)
Dept: RADIOLOGY | Facility: HOSPITAL | Age: 58
Discharge: HOME | End: 2018-05-07
Attending: INTERNAL MEDICINE
Payer: COMMERCIAL

## 2018-05-07 ENCOUNTER — OFFICE VISIT (OUTPATIENT)
Dept: TRANSPLANT | Facility: CLINIC | Age: 58
End: 2018-05-07
Payer: COMMERCIAL

## 2018-05-07 ENCOUNTER — LAB VISIT (OUTPATIENT)
Dept: LAB | Facility: HOSPITAL | Age: 58
End: 2018-05-07
Attending: INTERNAL MEDICINE
Payer: COMMERCIAL

## 2018-05-07 VITALS
HEIGHT: 70 IN | OXYGEN SATURATION: 98 % | TEMPERATURE: 98 F | WEIGHT: 216 LBS | HEART RATE: 85 BPM | RESPIRATION RATE: 20 BRPM | BODY MASS INDEX: 30.92 KG/M2 | DIASTOLIC BLOOD PRESSURE: 72 MMHG | SYSTOLIC BLOOD PRESSURE: 143 MMHG

## 2018-05-07 DIAGNOSIS — Z79.2 PROPHYLACTIC ANTIBIOTIC: ICD-10-CM

## 2018-05-07 DIAGNOSIS — Z94.2 LUNG REPLACED BY TRANSPLANT: ICD-10-CM

## 2018-05-07 DIAGNOSIS — Z94.2 LUNG REPLACED BY TRANSPLANT: Primary | ICD-10-CM

## 2018-05-07 DIAGNOSIS — E83.42 HYPOMAGNESEMIA: ICD-10-CM

## 2018-05-07 DIAGNOSIS — D84.9 IMMUNOSUPPRESSION: ICD-10-CM

## 2018-05-07 LAB
ABSOLUTE CD3: 102 CELLS/UL (ref 700–2100)
ALBUMIN SERPL BCP-MCNC: 3.9 G/DL
ALP SERPL-CCNC: 87 U/L
ALT SERPL W/O P-5'-P-CCNC: 18 U/L
ANION GAP SERPL CALC-SCNC: 9 MMOL/L
ANISOCYTOSIS BLD QL SMEAR: SLIGHT
AST SERPL-CCNC: 21 U/L
BASOPHILS NFR BLD: 2 %
BILIRUB SERPL-MCNC: 0.3 MG/DL
BUN SERPL-MCNC: 12 MG/DL
CALCIUM SERPL-MCNC: 9.3 MG/DL
CD3%: 25.5 % (ref 55–83)
CHLORIDE SERPL-SCNC: 106 MMOL/L
CO2 SERPL-SCNC: 28 MMOL/L
CREAT SERPL-MCNC: 0.8 MG/DL
DIFFERENTIAL METHOD: ABNORMAL
EOSINOPHIL NFR BLD: 3 %
ERYTHROCYTE [DISTWIDTH] IN BLOOD BY AUTOMATED COUNT: 13 %
EST. GFR  (AFRICAN AMERICAN): >60 ML/MIN/1.73 M^2
EST. GFR  (NON AFRICAN AMERICAN): >60 ML/MIN/1.73 M^2
GLUCOSE SERPL-MCNC: 147 MG/DL
HCT VFR BLD AUTO: 38.5 %
HGB BLD-MCNC: 12 G/DL
HYPOCHROMIA BLD QL SMEAR: ABNORMAL
IMM GRANULOCYTES # BLD AUTO: ABNORMAL K/UL
IMM GRANULOCYTES NFR BLD AUTO: ABNORMAL %
LYMPHOCYTES NFR BLD: 3 %
MAGNESIUM SERPL-MCNC: 1.7 MG/DL
MCH RBC QN AUTO: 29.3 PG
MCHC RBC AUTO-ENTMCNC: 31.2 G/DL
MCV RBC AUTO: 94 FL
METAMYELOCYTES NFR BLD MANUAL: 1 %
MONOCYTES NFR BLD: 8 %
NEUTROPHILS NFR BLD: 78 %
NEUTS BAND NFR BLD MANUAL: 5 %
NRBC BLD-RTO: 0 /100 WBC
OVALOCYTES BLD QL SMEAR: ABNORMAL
PLATELET # BLD AUTO: 356 K/UL
PLATELET BLD QL SMEAR: ABNORMAL
PMV BLD AUTO: 8.9 FL
POIKILOCYTOSIS BLD QL SMEAR: SLIGHT
POLYCHROMASIA BLD QL SMEAR: ABNORMAL
POTASSIUM SERPL-SCNC: 4.6 MMOL/L
PRE FEV1 FVC: 80
PRE FEV1: 2.56
PRE FVC: 3.19
PREDICTED FEV1 FVC: 80
PREDICTED FEV1: 3.71
PREDICTED FVC: 4.57
PROT SERPL-MCNC: 6.8 G/DL
RBC # BLD AUTO: 4.1 M/UL
SODIUM SERPL-SCNC: 143 MMOL/L
TACROLIMUS BLD-MCNC: 6.4 NG/ML
WBC # BLD AUTO: 10.69 K/UL

## 2018-05-07 PROCEDURE — 86359 T CELLS TOTAL COUNT: CPT

## 2018-05-07 PROCEDURE — 83735 ASSAY OF MAGNESIUM: CPT

## 2018-05-07 PROCEDURE — 71046 X-RAY EXAM CHEST 2 VIEWS: CPT | Mod: TC

## 2018-05-07 PROCEDURE — 71046 X-RAY EXAM CHEST 2 VIEWS: CPT | Mod: 26,,, | Performed by: RADIOLOGY

## 2018-05-07 PROCEDURE — 99214 OFFICE O/P EST MOD 30 MIN: CPT | Mod: 25,S$GLB,, | Performed by: INTERNAL MEDICINE

## 2018-05-07 PROCEDURE — 36415 COLL VENOUS BLD VENIPUNCTURE: CPT | Mod: NTX

## 2018-05-07 PROCEDURE — 86977 RBC SERUM PRETX INCUBJ/INHIB: CPT | Mod: 91,PO,NTX

## 2018-05-07 PROCEDURE — 86352 CELL FUNCTION ASSAY W/STIM: CPT | Mod: NTX

## 2018-05-07 PROCEDURE — 99999 PR PBB SHADOW E&M-EST. PATIENT-LVL III: CPT | Mod: PBBFAC,,, | Performed by: INTERNAL MEDICINE

## 2018-05-07 PROCEDURE — 80197 ASSAY OF TACROLIMUS: CPT | Mod: NTX

## 2018-05-07 PROCEDURE — 86833 HLA CLASS II HIGH DEFIN QUAL: CPT | Mod: PO,TXP

## 2018-05-07 PROCEDURE — 80053 COMPREHEN METABOLIC PANEL: CPT | Mod: NTX

## 2018-05-07 PROCEDURE — 86832 HLA CLASS I HIGH DEFIN QUAL: CPT | Mod: 91,PO,TXP

## 2018-05-07 RX ORDER — TACROLIMUS 0.5 MG/1
0.5 CAPSULE ORAL EVERY 12 HOURS
Qty: 60 CAPSULE | Refills: 11 | Status: SHIPPED | OUTPATIENT
Start: 2018-05-07 | End: 2018-06-12 | Stop reason: SDUPTHER

## 2018-05-07 NOTE — TELEPHONE ENCOUNTER
----- Message from Samantha Bill DO sent at 5/7/2018  3:38 PM CDT -----  Increase tac to 0.5mg BID.

## 2018-05-07 NOTE — TELEPHONE ENCOUNTER
Received written orders from Dr. Bill instructing patient to increase prograf to 0.5 mg bid from 0.5 mg once daily.  Patient contacted and instructed to make the above dose adjustment.  Patient will have repeat labs on Wednesday in [place].  Pt reminded to hold morning dose of prograf until after labs are drawn.  Pt verbalized understanding and did not have any further questions at this time.

## 2018-05-07 NOTE — PROGRESS NOTES
LUNG TRANSPLANT RECIPIENT FOLLOW-UP     Reason for Visit: Follow-up after lung transplantation.                               Date of Transplant: 8/22/17     Reason for Transplant: Sarcoidosis with pulmonary hypertension     Type of Transplant: bilateral sequential lung     CMV Status: D+ / R-      Major Complications:   1. Left vocal cord dysfunction  2. 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 is a 57 y.o. year old male who presents for routine  follow up. Patient received pulse steroids x 2 for A2 rejection in March. Repeat bronchoscopy on 4/19/18 without evidence of rejection. Since patient's admission, he states he has been feeling well. Patient denies fevers, chills, recent sick contacts, cough, chest pain, n/v/d/c, SOB. No other complaints at this time. He states he is compliant with all of his medications.    Review of Systems   Constitutional: Negative for chills, diaphoresis, fever, malaise/fatigue and weight loss.   HENT: Negative for congestion, sinus pain and sore throat.    Eyes: Negative.    Respiratory: Negative for cough, hemoptysis, sputum production, shortness of breath, wheezing and stridor.    Cardiovascular: Negative for chest pain, palpitations, orthopnea, claudication, leg swelling (intermittent, controlled with prn lasix) and PND.   Gastrointestinal: Negative.  Negative for abdominal pain, constipation, diarrhea, nausea and vomiting.   Genitourinary: Negative.  Negative for dysuria and urgency.   Musculoskeletal: Negative.    Skin: Negative.    Neurological: Negative.  Negative for dizziness, loss of consciousness, weakness and headaches.   Endo/Heme/Allergies: Negative.    Psychiatric/Behavioral: Negative.  Negative for depression.     BP (!) 143/72 (BP Location: Right arm, Patient Position: Sitting, BP Method: Medium (Automatic))   Pulse 85   Temp 97.9 °F (36.6 °C) (Oral)   Resp  "20   Ht 5' 10" (1.778 m)   Wt 98 kg (216 lb)   SpO2 98%   BMI 30.99 kg/m²     Physical Exam   Constitutional: He is oriented to person, place, and time. No distress.   HENT:   Head: Normocephalic and atraumatic.   Eyes: Conjunctivae are normal. No scleral icterus.   Neck: Normal range of motion. Neck supple. No JVD present.   Cardiovascular: Normal rate, regular rhythm and normal heart sounds.  Exam reveals no gallop and no friction rub.    No murmur heard.  Pulmonary/Chest: Effort normal and breath sounds normal. No stridor. No respiratory distress. He has no wheezes. He has no rales. He exhibits no tenderness.   Abdominal: Soft. Bowel sounds are normal. He exhibits no distension. There is no tenderness.   Musculoskeletal: Normal range of motion. He exhibits no edema, tenderness or deformity.   Neurological: He is alert and oriented to person, place, and time. Gait normal.   Skin: Skin is warm and dry. He is not diaphoretic. No erythema. No pallor.   Psychiatric: Mood, memory, affect and judgment normal.   Nursing note and vitals reviewed.    Labs:  cbc, cmp, tacrolimus Latest Ref Rng & Units 4/16/2018 4/20/2018 5/7/2018   TACROLIMUS LVL 5.0 - 15.0 ng/mL 19.2(H) 11.6 -   WHITE BLOOD CELL COUNT 3.90 - 12.70 K/uL - - 10.69   RBC 4.60 - 6.20 M/uL - - 4.10(L)   HEMOGLOBIN 14.0 - 18.0 g/dL - - 12.0(L)   HEMATOCRIT 40.0 - 54.0 % - - 38.5(L)   MCV 82 - 98 fL - - 94   MCH 27.0 - 31.0 pg - - 29.3   MCHC 32.0 - 36.0 g/dL - - 31.2(L)   RDW 11.5 - 14.5 % - - 13.0   PLATELETS 150 - 350 K/uL - - 356(H)   MPV 9.2 - 12.9 fL - - 8.9(L)   GRAN # 1.8 - 7.7 K/uL - - -   LYMPH # 1.0 - 4.8 K/uL - - -   MONO # 0.3 - 1.0 K/uL - - -   EOSINOPHIL% 0.0 - 8.0 % - - -   BASOPHIL% 0.0 - 1.9 % - - -   DIFFERENTIAL METHOD - - - -   SODIUM 136 - 145 mmol/L 142 142 143   POTASSIUM 3.5 - 5.1 mmol/L 4.8 4.7 4.6   CHLORIDE 95 - 110 mmol/L 105 105 106   CO2 23 - 29 mmol/L 29 31(H) 28   GLUCOSE 70 - 110 mg/dL 134(H) 132(H) 147(H)   BUN BLD 6 - 20 " mg/dL 25(H) 23(H) 12   CREATININE 0.5 - 1.4 mg/dL 1.0 1.1 0.8   CALCIUM 8.7 - 10.5 mg/dL 9.3 9.5 9.3   PROTEIN TOTAL 6.0 - 8.4 g/dL - - 6.8   ALBUMIN 3.5 - 5.2 g/dL - - 3.9   BILIRUBIN TOTAL 0.1 - 1.0 mg/dL - - 0.3   ALK PHOS 55 - 135 U/L - - 87   AST 10 - 40 U/L - - 21   ALT 10 - 44 U/L - - 18   ANION GAP 8 - 16 mmol/L 8 6(L) 9   EGFR IF AFRICAN AMERICAN >60 mL/min/1.73 m:2 >60.0 >60.0 >60.0   EGFR IF NON-AFRICAN AMERICAN >60 mL/min/1.73 m:2 >60.0 >60.0 >60.0     Pulmonary Function Tests 5/7/2018 4/9/2018 3/19/2018 2/19/2018 1/16/2018 12/18/2017 11/16/2017   FVC 3.19 3.22 3.21 3.4 3.5 3.3 3   FEV1 2.56 2.6 2.61 2.79 2.83 2.69 2.53   TLC (liters) - - - - - - -   DLCO (ml/mmHg sec) - - - - - - -   FVC% 69 70 70 74 76 72 65   FEV1% 69 70 70 75 76 72 67   FEF 25-75 2.45 2.47 2.53 2.77 2.82 2.62 2.51   FEF 25-75% 65 65 67 73 74 69 65   TLC% - - - - - - -   RV - - - - - - -   RV% - - - - - - -   DLCO% - - - - - - -       Imaging:  Results for orders placed during the hospital encounter of 05/07/18   X-Ray Chest PA And Lateral    Narrative EXAMINATION:  XR CHEST PA AND LATERAL    CLINICAL HISTORY:  s/p bilateral lung transplant; Lung transplant status    TECHNIQUE:  PA and lateral views of the chest were performed.    COMPARISON:  04/19/2018    FINDINGS:  Patient has had bilateral lung transplant.  Heart size is normal with postoperative changes is seen.  There is a little pleural thickening and scarring at the left lung base.  No acute findings are noted and chest is similar to previous studies      Impression No interval change      Electronically signed by: Carlton Parker MD  Date:    05/07/2018  Time:    07:39       Assessment:  1. Lung replaced by transplant    2. Immunosuppression    3. Prophylactic antibiotic      Plan:     1. FEV1 stable with downward trend. CXR without acute change. Patient underwent pulse steroids x 2 for A2 rejection on 3/21/18 bronchoscopy. Repeat bronchoscopy on 4/19/18 without evidence of  rejection. Will continue to monitor PFTs outpatient. Patient remains asymptomatic.     2. Continue current immunosuppression regimen of tacrolimus 0.5 mg BID,  mg BID, prednisone 5 mg daily.     3. Continue Bactrim, Valcyte, Voriconazole.     Return to clinic in approximately 1 month or sooner if needed.       Teresa Trejo PA-C

## 2018-05-09 ENCOUNTER — LAB VISIT (OUTPATIENT)
Dept: LAB | Facility: HOSPITAL | Age: 58
End: 2018-05-09
Attending: INTERNAL MEDICINE
Payer: COMMERCIAL

## 2018-05-09 DIAGNOSIS — Z94.2 LUNG REPLACED BY TRANSPLANT: ICD-10-CM

## 2018-05-09 LAB
ANION GAP SERPL CALC-SCNC: 9 MMOL/L
BUN SERPL-MCNC: 21 MG/DL
CALCIUM SERPL-MCNC: 9.5 MG/DL
CHLORIDE SERPL-SCNC: 107 MMOL/L
CLASS I ANTIBODIES - LUMINEX: NEGATIVE
CLASS I ANTIBODY COMMENTS - LUMINEX: NORMAL
CLASS II ANTIBODY COMMENTS - LUMINEX: NORMAL
CO2 SERPL-SCNC: 27 MMOL/L
CREAT SERPL-MCNC: 0.8 MG/DL
DSA1 TESTING DATE: NORMAL
DSA2 TESTING DATE: NORMAL
EST. GFR  (AFRICAN AMERICAN): >60 ML/MIN/1.73 M^2
EST. GFR  (NON AFRICAN AMERICAN): >60 ML/MIN/1.73 M^2
GLUCOSE SERPL-MCNC: 124 MG/DL
IMMUNKNOW (STIMULATED): 77 NG/ML
POTASSIUM SERPL-SCNC: 4.3 MMOL/L
SERUM COLLECTION DT - LUMINEX CLASS I: NORMAL
SERUM COLLECTION DT - LUMINEX CLASS II: NORMAL
SODIUM SERPL-SCNC: 143 MMOL/L

## 2018-05-09 PROCEDURE — 36415 COLL VENOUS BLD VENIPUNCTURE: CPT | Mod: PO

## 2018-05-09 PROCEDURE — 80197 ASSAY OF TACROLIMUS: CPT

## 2018-05-09 PROCEDURE — 80048 BASIC METABOLIC PNL TOTAL CA: CPT

## 2018-05-10 LAB
FUNGUS BLD CULT: NORMAL
TACROLIMUS BLD-MCNC: 12.8 NG/ML

## 2018-05-11 ENCOUNTER — PATIENT MESSAGE (OUTPATIENT)
Dept: TRANSPLANT | Facility: CLINIC | Age: 58
End: 2018-05-11

## 2018-05-11 DIAGNOSIS — Z94.2 LUNG REPLACED BY TRANSPLANT: Primary | ICD-10-CM

## 2018-05-11 RX ORDER — MYCOPHENOLATE MOFETIL 250 MG/1
1000 CAPSULE ORAL 2 TIMES DAILY
Qty: 240 CAPSULE | Refills: 11 | Status: SHIPPED | OUTPATIENT
Start: 2018-05-11 | End: 2018-12-07 | Stop reason: SINTOL

## 2018-05-11 NOTE — TELEPHONE ENCOUNTER
Received verbal orders with read back from Dr. Wolfe to increase the patient's Cellcept dose to 1000 mg every 12 hours (from 500 mg every 12 hours).  [facility] message sent to the patient with instructions with request that he reply to confirm receipt of the message. Erx sent to Dr. Wolfe to sign and transmit to [facility].

## 2018-05-21 LAB
FUNGUS SPEC CULT: NORMAL
FUNGUS SPEC CULT: NORMAL

## 2018-06-06 ENCOUNTER — HOSPITAL ENCOUNTER (OUTPATIENT)
Dept: PULMONOLOGY | Facility: CLINIC | Age: 58
Discharge: HOME | End: 2018-06-06
Payer: COMMERCIAL

## 2018-06-06 ENCOUNTER — LAB VISIT (OUTPATIENT)
Dept: LAB | Facility: HOSPITAL | Age: 58
End: 2018-06-06
Attending: INTERNAL MEDICINE
Payer: COMMERCIAL

## 2018-06-06 ENCOUNTER — PATIENT MESSAGE (OUTPATIENT)
Dept: TRANSPLANT | Facility: CLINIC | Age: 58
End: 2018-06-06

## 2018-06-06 ENCOUNTER — OFFICE VISIT (OUTPATIENT)
Dept: TRANSPLANT | Facility: CLINIC | Age: 58
End: 2018-06-06
Payer: COMMERCIAL

## 2018-06-06 ENCOUNTER — HOSPITAL ENCOUNTER (OUTPATIENT)
Dept: RADIOLOGY | Facility: HOSPITAL | Age: 58
Discharge: HOME | End: 2018-06-06
Attending: INTERNAL MEDICINE
Payer: COMMERCIAL

## 2018-06-06 VITALS
OXYGEN SATURATION: 97 % | DIASTOLIC BLOOD PRESSURE: 86 MMHG | TEMPERATURE: 97 F | HEIGHT: 70 IN | WEIGHT: 212 LBS | BODY MASS INDEX: 30.35 KG/M2 | SYSTOLIC BLOOD PRESSURE: 134 MMHG | RESPIRATION RATE: 20 BRPM | HEART RATE: 82 BPM

## 2018-06-06 DIAGNOSIS — Z94.2 LUNG REPLACED BY TRANSPLANT: ICD-10-CM

## 2018-06-06 DIAGNOSIS — E83.42 HYPOMAGNESEMIA: ICD-10-CM

## 2018-06-06 DIAGNOSIS — Z79.2 PROPHYLACTIC ANTIBIOTIC: ICD-10-CM

## 2018-06-06 DIAGNOSIS — D84.9 IMMUNOSUPPRESSION: ICD-10-CM

## 2018-06-06 DIAGNOSIS — Z48.298 ENCOUNTER FOLLOWING ORGAN TRANSPLANT: Primary | ICD-10-CM

## 2018-06-06 LAB
ALBUMIN SERPL BCP-MCNC: 4.4 G/DL
ALP SERPL-CCNC: 99 U/L
ALT SERPL W/O P-5'-P-CCNC: 18 U/L
ANION GAP SERPL CALC-SCNC: 10 MMOL/L
ANISOCYTOSIS BLD QL SMEAR: SLIGHT
AST SERPL-CCNC: 23 U/L
BASOPHILS NFR BLD: 0 %
BILIRUB SERPL-MCNC: 0.5 MG/DL
BUN SERPL-MCNC: 31 MG/DL
CALCIUM SERPL-MCNC: 10 MG/DL
CHLORIDE SERPL-SCNC: 97 MMOL/L
CO2 SERPL-SCNC: 31 MMOL/L
CREAT SERPL-MCNC: 1.4 MG/DL
DIFFERENTIAL METHOD: ABNORMAL
EOSINOPHIL NFR BLD: 1 %
ERYTHROCYTE [DISTWIDTH] IN BLOOD BY AUTOMATED COUNT: 12.6 %
EST. GFR  (AFRICAN AMERICAN): >60 ML/MIN/1.73 M^2
EST. GFR  (NON AFRICAN AMERICAN): 56.2 ML/MIN/1.73 M^2
GLUCOSE SERPL-MCNC: 150 MG/DL
HCT VFR BLD AUTO: 39.6 %
HGB BLD-MCNC: 13.2 G/DL
HYPOCHROMIA BLD QL SMEAR: ABNORMAL
IMM GRANULOCYTES # BLD AUTO: ABNORMAL K/UL
IMM GRANULOCYTES NFR BLD AUTO: ABNORMAL %
LYMPHOCYTES NFR BLD: 6 %
MAGNESIUM SERPL-MCNC: 1.6 MG/DL
MCH RBC QN AUTO: 29.4 PG
MCHC RBC AUTO-ENTMCNC: 33.3 G/DL
MCV RBC AUTO: 88 FL
MONOCYTES NFR BLD: 1 %
NEUTROPHILS NFR BLD: 84 %
NEUTS BAND NFR BLD MANUAL: 8 %
NRBC BLD-RTO: 0 /100 WBC
OVALOCYTES BLD QL SMEAR: ABNORMAL
PLATELET # BLD AUTO: 382 K/UL
PLATELET BLD QL SMEAR: ABNORMAL
PMV BLD AUTO: 8.6 FL
POIKILOCYTOSIS BLD QL SMEAR: SLIGHT
POLYCHROMASIA BLD QL SMEAR: ABNORMAL
POTASSIUM SERPL-SCNC: 4.3 MMOL/L
PRE FEV1 FVC: 82
PRE FEV1: 2.72
PRE FVC: 3.3
PREDICTED FEV1 FVC: 80
PREDICTED FEV1: 3.71
PREDICTED FVC: 4.57
PROT SERPL-MCNC: 7.5 G/DL
RBC # BLD AUTO: 4.49 M/UL
SODIUM SERPL-SCNC: 138 MMOL/L
TACROLIMUS BLD-MCNC: 17.9 NG/ML
WBC # BLD AUTO: 11.41 K/UL

## 2018-06-06 PROCEDURE — 94010 BREATHING CAPACITY TEST: CPT | Mod: S$GLB,,, | Performed by: INTERNAL MEDICINE

## 2018-06-06 PROCEDURE — 80053 COMPREHEN METABOLIC PANEL: CPT | Mod: NTX

## 2018-06-06 PROCEDURE — 71046 X-RAY EXAM CHEST 2 VIEWS: CPT | Mod: 26,,, | Performed by: RADIOLOGY

## 2018-06-06 PROCEDURE — 99214 OFFICE O/P EST MOD 30 MIN: CPT | Mod: 25,S$GLB,, | Performed by: INTERNAL MEDICINE

## 2018-06-06 PROCEDURE — 99999 PR PBB SHADOW E&M-EST. PATIENT-LVL III: CPT | Mod: PBBFAC,,, | Performed by: INTERNAL MEDICINE

## 2018-06-06 PROCEDURE — 86977 RBC SERUM PRETX INCUBJ/INHIB: CPT | Mod: 91,PO,TXP

## 2018-06-06 PROCEDURE — 86833 HLA CLASS II HIGH DEFIN QUAL: CPT | Mod: 91,PO,NTX

## 2018-06-06 PROCEDURE — 86832 HLA CLASS I HIGH DEFIN QUAL: CPT | Mod: PO,TXP

## 2018-06-06 PROCEDURE — 83735 ASSAY OF MAGNESIUM: CPT

## 2018-06-06 PROCEDURE — 36415 COLL VENOUS BLD VENIPUNCTURE: CPT

## 2018-06-06 PROCEDURE — 80197 ASSAY OF TACROLIMUS: CPT

## 2018-06-06 PROCEDURE — 71046 X-RAY EXAM CHEST 2 VIEWS: CPT | Mod: TC

## 2018-06-06 NOTE — PROGRESS NOTES
LUNG TRANSPLANT RECIPIENT FOLLOW-UP    Reason for Visit: Follow-up after lung transplantation.                               Date of Transplant: 8/22/17     Reason for Transplant: Sarcoidosis with pulmonary hypertension     Type of Transplant: bilateral sequential lung     CMV Status: D+ / R-      Major Complications:   1. Left vocal cord dysfunction  2. 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 is a 57 y.o. year old male with the above transplant history.  Patient reports doing well today and has no new complaints. Patient notes an intermittent dry cough but has no complaints of SOB. 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 Patient's BUN/Cr today slightly elevated from baseline at 31/1.4. Encouraged patient to increase fluid intake and monitor hydration status by urine color.     Review of Systems   Constitutional: Negative for chills, diaphoresis, fever, malaise/fatigue and weight loss.   HENT: Negative for congestion, ear discharge, ear pain, hearing loss, nosebleeds, sinus pain, sore throat and tinnitus.    Eyes: Negative for blurred vision, double vision, photophobia, pain, discharge and redness.   Respiratory: Positive for cough (intermittent, non-productive). Negative for hemoptysis, sputum production, shortness of breath, wheezing and stridor.    Cardiovascular: Positive for leg swelling (LLE > RLE). Negative for chest pain, palpitations, orthopnea, claudication and PND.   Gastrointestinal: Negative for abdominal pain, blood in stool, constipation, diarrhea, heartburn, melena, nausea and vomiting.   Genitourinary: Negative for dysuria, flank pain, frequency, hematuria and urgency.   Musculoskeletal: Negative for back pain, falls, joint pain, myalgias and neck pain.   Skin:  "Negative for itching and rash.   Neurological: Negative for dizziness, tingling, tremors, sensory change, speech change, focal weakness, seizures, loss of consciousness, weakness and headaches.   Endo/Heme/Allergies: Negative for environmental allergies and polydipsia. Does not bruise/bleed easily.   Psychiatric/Behavioral: Negative for depression, hallucinations, memory loss, substance abuse and suicidal ideas. The patient is not nervous/anxious and does not have insomnia.      /86   Pulse 82   Temp 97.2 °F (36.2 °C) (Oral)   Resp 20   Ht 5' 10" (1.778 m)   Wt 96.2 kg (212 lb)   SpO2 97%   BMI 30.42 kg/m²     Physical Exam   Constitutional: He is oriented to person, place, and time and well-developed, well-nourished, and in no distress.   HENT:   Head: Normocephalic and atraumatic.   Nose: Nose normal.   Mouth/Throat: Oropharynx is clear and moist.   Eyes: Conjunctivae and EOM are normal.   Neck: Normal range of motion. Neck supple.   Cardiovascular: Normal rate, regular rhythm, normal heart sounds and intact distal pulses.  Exam reveals no gallop and no friction rub.    No murmur heard.  Pulmonary/Chest: Effort normal and breath sounds normal. No respiratory distress. He has no wheezes. He has no rales. He exhibits no tenderness.   Rhonchi in left lower lung field   Abdominal: Soft. He exhibits no distension. There is no tenderness.   Musculoskeletal: Normal range of motion. He exhibits edema (BLE to mid-calf).   Neurological: He is alert and oriented to person, place, and time. Gait normal.   Skin: Skin is warm and dry.   Psychiatric: Mood, memory, affect and judgment normal.     Labs:  cbc, cmp, tacrolimus Latest Ref Rng & Units 5/7/2018 5/9/2018 6/6/2018   TACROLIMUS LVL 5.0 - 15.0 ng/mL 6.4 12.8 -   WHITE BLOOD CELL COUNT 3.90 - 12.70 K/uL 10.69 - 11.41   RBC 4.60 - 6.20 M/uL 4.10(L) - 4.49(L)   HEMOGLOBIN 14.0 - 18.0 g/dL 12.0(L) - 13.2(L)   HEMATOCRIT 40.0 - 54.0 % 38.5(L) - 39.6(L)   MCV 82 - " 98 fL 94 - 88   MCH 27.0 - 31.0 pg 29.3 - 29.4   MCHC 32.0 - 36.0 g/dL 31.2(L) - 33.3   RDW 11.5 - 14.5 % 13.0 - 12.6   PLATELETS 150 - 350 K/uL 356(H) - 382(H)   MPV 9.2 - 12.9 fL 8.9(L) - 8.6(L)   GRAN # 1.8 - 7.7 K/uL - - -   LYMPH # 1.0 - 4.8 K/uL - - -   MONO # 0.3 - 1.0 K/uL - - -   EOSINOPHIL% 0.0 - 8.0 % 3.0 - -   BASOPHIL% 0.0 - 1.9 % 2.0(H) - -   DIFFERENTIAL METHOD - Manual - -   SODIUM 136 - 145 mmol/L 143 143 138   POTASSIUM 3.5 - 5.1 mmol/L 4.6 4.3 4.3   CHLORIDE 95 - 110 mmol/L 106 107 97   CO2 23 - 29 mmol/L 28 27 31(H)   GLUCOSE 70 - 110 mg/dL 147(H) 124(H) 150(H)   BUN BLD 6 - 20 mg/dL 12 21(H) 31(H)   CREATININE 0.5 - 1.4 mg/dL 0.8 0.8 1.4   CALCIUM 8.7 - 10.5 mg/dL 9.3 9.5 10.0   PROTEIN TOTAL 6.0 - 8.4 g/dL 6.8 - 7.5   ALBUMIN 3.5 - 5.2 g/dL 3.9 - 4.4   BILIRUBIN TOTAL 0.1 - 1.0 mg/dL 0.3 - 0.5   ALK PHOS 55 - 135 U/L 87 - 99   AST 10 - 40 U/L 21 - 23   ALT 10 - 44 U/L 18 - 18   ANION GAP 8 - 16 mmol/L 9 9 10   EGFR IF AFRICAN AMERICAN >60 mL/min/1.73 m:2 >60.0 >60.0 >60.0   EGFR IF NON-AFRICAN AMERICAN >60 mL/min/1.73 m:2 >60.0 >60.0 56.2(A)     Pulmonary Function Tests 6/6/2018 5/7/2018 4/9/2018 3/19/2018 2/19/2018 1/16/2018 12/18/2017   FVC 3.3 3.19 3.22 3.21 3.4 3.5 3.3   FEV1 2.72 2.56 2.6 2.61 2.79 2.83 2.69   TLC (liters) - - - - - - -   DLCO (ml/mmHg sec) - - - - - - -   FVC% 72 69 70 70 74 76 72   FEV1% 73 69 70 70 75 76 72   FEF 25-75 2.95 2.45 2.47 2.53 2.77 2.82 2.62   FEF 25-75% 78 65 65 67 73 74 69   TLC% - - - - - - -   RV - - - - - - -   RV% - - - - - - -   DLCO% - - - - - - -       Imaging:  Results for orders placed during the hospital encounter of 06/06/18   X-Ray Chest PA And Lateral    Narrative EXAMINATION:  XR CHEST PA AND LATERAL    CLINICAL HISTORY:  s/p bilateral lung transplant;  Lung transplant status    FINDINGS:  Heart size is normal.  There is postoperative change.  There is left basal scar versus atelectasis.  Right lung is clear.  Bones showed DJD.       Impression See above    Left basal scar versus atelectasis      Electronically signed by: Ulysses Miles MD  Date:    06/06/2018  Time:    08:22       Assessment:  1. Encounter following organ transplant    2. Lung replaced by transplant    3. Immunosuppression    4. Prophylactic antibiotic      Plan:   1. CXR stable. PFT improved today with FEV1 2.72 and returning to baseline. Clinically stable from respiratory standpoint.     2. Continue current immunosuppression and prophylaxis. Previously treated for A2 rejection in March 2018 with Thymoglobulin x3 doses. A2 rejection noted in October and November 2017 and treated with pulse dose steroids. Most recent bronchoscopy on 4/19/18 with no evidence of rejection.  FEV1 continues to show improvement     3. Continue tacrolimus, MMF, and prednisone. Labs reviewed. BUN/Cr elevated at 31/1.4. Encouraged increased PO fluid intake. Will continue to monitor tacrolimus level and dose adjust accordingly.      4. Continue Bactrim, Valcyte, and Voriconazole.      5. Follow up in one month or earlier if needed.     Patricia Fowler PA-C  Lung Transplant  [id number]

## 2018-06-07 ENCOUNTER — PATIENT MESSAGE (OUTPATIENT)
Dept: TRANSPLANT | Facility: CLINIC | Age: 58
End: 2018-06-07

## 2018-06-07 ENCOUNTER — TELEPHONE (OUTPATIENT)
Dept: TRANSPLANT | Facility: CLINIC | Age: 58
End: 2018-06-07

## 2018-06-07 LAB
CLASS I ANTIBODIES - LUMINEX: NEGATIVE
CLASS I ANTIBODY COMMENTS - LUMINEX: NORMAL
CLASS II ANTIBODY COMMENTS - LUMINEX: NORMAL
DSA1 TESTING DATE: NORMAL
DSA12 TESTING DATE: NORMAL
DSA2 TESTING DATE: NORMAL
SERUM COLLECTION DT - LUMINEX CLASS I: NORMAL
SERUM COLLECTION DT - LUMINEX CLASS II: NORMAL

## 2018-06-07 NOTE — TELEPHONE ENCOUNTER
Received written orders from Dr. Brewster to have patient hold voriconazole for now and monitor tacrolimus levels and adjust tacrolimus as needed until desired levels are reached (12-15).  Contacted patient to inform him to hold the voriconazole for now until and have labs done on Monday 6/11/18 in [place].  We will contact him once the lab results are reviewed with further instructions.  Patient verbalized understanding and did not have any further questions at this time.

## 2018-06-07 NOTE — TELEPHONE ENCOUNTER
----- Message from Darrick Wolfe MD sent at 6/6/2018  6:29 PM CDT -----  I would like to keep his tacro between 12-15 for now. He is on vori for prophy after rATG if I'm not mistaken. I would hold vori for now and adjust his tac to the desired level. Encourage hydration.   ----- Message -----  From: Danielle BRICEÑO Do, RN  Sent: 6/6/2018  12:08 PM  To: Darrick Wolfe MD, Samantha Bill DO    Confirmed with patient that meds and labs were taken correctly.

## 2018-06-11 ENCOUNTER — LAB VISIT (OUTPATIENT)
Dept: LAB | Facility: HOSPITAL | Age: 58
End: 2018-06-11
Attending: FAMILY MEDICINE
Payer: COMMERCIAL

## 2018-06-11 DIAGNOSIS — Z94.2 LUNG REPLACED BY TRANSPLANT: ICD-10-CM

## 2018-06-11 LAB
ANION GAP SERPL CALC-SCNC: 11 MMOL/L
BUN SERPL-MCNC: 29 MG/DL
CALCIUM SERPL-MCNC: 9.7 MG/DL
CHLORIDE SERPL-SCNC: 98 MMOL/L
CO2 SERPL-SCNC: 31 MMOL/L
CREAT SERPL-MCNC: 1.2 MG/DL
EST. GFR  (AFRICAN AMERICAN): >60 ML/MIN/1.73 M^2
EST. GFR  (NON AFRICAN AMERICAN): >60 ML/MIN/1.73 M^2
GLUCOSE SERPL-MCNC: 159 MG/DL
POTASSIUM SERPL-SCNC: 4.2 MMOL/L
SODIUM SERPL-SCNC: 140 MMOL/L

## 2018-06-11 PROCEDURE — 80048 BASIC METABOLIC PNL TOTAL CA: CPT

## 2018-06-11 PROCEDURE — 80197 ASSAY OF TACROLIMUS: CPT

## 2018-06-11 PROCEDURE — 36415 COLL VENOUS BLD VENIPUNCTURE: CPT | Mod: PO

## 2018-06-12 ENCOUNTER — PATIENT MESSAGE (OUTPATIENT)
Dept: TRANSPLANT | Facility: CLINIC | Age: 58
End: 2018-06-12

## 2018-06-12 DIAGNOSIS — Z94.2 LUNG REPLACED BY TRANSPLANT: ICD-10-CM

## 2018-06-12 LAB — TACROLIMUS BLD-MCNC: 9.9 NG/ML

## 2018-06-12 NOTE — TELEPHONE ENCOUNTER
----- Message from Darrick Wolfe MD sent at 6/12/2018 10:32 AM CDT -----  Increase the AM dose to 1mg  ----- Message -----  From: Danielle BRICEÑO Do, RN  Sent: 6/12/2018  10:16 AM  To: Darrick Wolfe MD    Current dose is 0.5 mg bid

## 2018-06-12 NOTE — TELEPHONE ENCOUNTER
Received written orders from Dr. Wolfe to increase morning dose of Prograf to 1 mg and keep the evening dose at 0.5 mg (from 0.5 mg bid)  Patient will have repeat labs done on Friday 6/15/18.  [facility] message sent to patient regarding the above dose change.  Asked patient to respond to confirm receipt of message.  Also left patient a voicemail regarding the above and asking him to call if he had any questions.

## 2018-06-13 DIAGNOSIS — Z94.2 LUNG REPLACED BY TRANSPLANT: ICD-10-CM

## 2018-06-13 RX ORDER — TACROLIMUS 0.5 MG/1
CAPSULE ORAL
Qty: 90 CAPSULE | Refills: 11
Start: 2018-06-13 | End: 2018-06-18 | Stop reason: SDUPTHER

## 2018-06-13 RX ORDER — TACROLIMUS 0.5 MG/1
CAPSULE ORAL
Qty: 90 CAPSULE | Refills: 11 | Status: SHIPPED | OUTPATIENT
Start: 2018-06-13 | End: 2018-06-13 | Stop reason: SDUPTHER

## 2018-06-13 NOTE — TELEPHONE ENCOUNTER
Returned call to [facility]. Clarification was provided to the pharmacist that patient is to take Prograf 2 caps (1 mg) every morning and 1 cap (0.5 mg) every evening.

## 2018-06-13 NOTE — TELEPHONE ENCOUNTER
----- Message from Star Pollard sent at 6/13/2018  1:12 PM CDT -----  Contact: [facility]   Needs Advice    Reason for call:    Clarify directions for medication tacrolimus (PROGRAF) 0.5 MG Cap  Communication Preference: [phone]   Additional Information:

## 2018-06-15 ENCOUNTER — LAB VISIT (OUTPATIENT)
Dept: LAB | Facility: HOSPITAL | Age: 58
End: 2018-06-15
Attending: INTERNAL MEDICINE
Payer: COMMERCIAL

## 2018-06-15 DIAGNOSIS — Z94.2 LUNG REPLACED BY TRANSPLANT: ICD-10-CM

## 2018-06-15 LAB
ANION GAP SERPL CALC-SCNC: 13 MMOL/L
BUN SERPL-MCNC: 23 MG/DL
CALCIUM SERPL-MCNC: 9.4 MG/DL
CHLORIDE SERPL-SCNC: 97 MMOL/L
CO2 SERPL-SCNC: 31 MMOL/L
CREAT SERPL-MCNC: 1 MG/DL
EST. GFR  (AFRICAN AMERICAN): >60 ML/MIN/1.73 M^2
EST. GFR  (NON AFRICAN AMERICAN): >60 ML/MIN/1.73 M^2
GLUCOSE SERPL-MCNC: 151 MG/DL
POTASSIUM SERPL-SCNC: 4 MMOL/L
SODIUM SERPL-SCNC: 141 MMOL/L

## 2018-06-15 PROCEDURE — 80048 BASIC METABOLIC PNL TOTAL CA: CPT

## 2018-06-15 PROCEDURE — 80197 ASSAY OF TACROLIMUS: CPT

## 2018-06-15 PROCEDURE — 36415 COLL VENOUS BLD VENIPUNCTURE: CPT | Mod: PO

## 2018-06-16 LAB — TACROLIMUS BLD-MCNC: 4.9 NG/ML

## 2018-06-18 DIAGNOSIS — Z94.2 LUNG REPLACED BY TRANSPLANT: ICD-10-CM

## 2018-06-18 RX ORDER — TACROLIMUS 0.5 MG/1
2 CAPSULE ORAL EVERY 12 HOURS
Qty: 240 CAPSULE | Refills: 11 | Status: SHIPPED | OUTPATIENT
Start: 2018-06-18 | End: 2018-06-22 | Stop reason: SDUPTHER

## 2018-06-18 NOTE — TELEPHONE ENCOUNTER
----- Message from Darrick Wolfe MD sent at 6/18/2018 10:01 AM CDT -----  Correction, increase to 2 mg bid

## 2018-06-18 NOTE — TELEPHONE ENCOUNTER
Received written orders from Dr. Wolfe instructing patient to increase prograf to 2 mg bid from 1/0.5 mg.  Patient will have repeat labs due on Thursday 6/21/18.  Contacted patient instructing him to make the above dose change and to have labs done on Thursday.  Patient repeated the changes and verbalized understanding of the above instructions and had no further questions at this time.

## 2018-06-21 ENCOUNTER — LAB VISIT (OUTPATIENT)
Dept: LAB | Facility: HOSPITAL | Age: 58
End: 2018-06-21
Attending: INTERNAL MEDICINE
Payer: COMMERCIAL

## 2018-06-21 DIAGNOSIS — Z94.2 LUNG REPLACED BY TRANSPLANT: ICD-10-CM

## 2018-06-21 LAB
ACID FAST MOD KINY STN SPEC: NORMAL
ACID FAST MOD KINY STN SPEC: NORMAL
ANION GAP SERPL CALC-SCNC: 9 MMOL/L
BUN SERPL-MCNC: 16 MG/DL
CALCIUM SERPL-MCNC: 9 MG/DL
CHLORIDE SERPL-SCNC: 105 MMOL/L
CO2 SERPL-SCNC: 28 MMOL/L
CREAT SERPL-MCNC: 0.9 MG/DL
EST. GFR  (AFRICAN AMERICAN): >60 ML/MIN/1.73 M^2
EST. GFR  (NON AFRICAN AMERICAN): >60 ML/MIN/1.73 M^2
GLUCOSE SERPL-MCNC: 122 MG/DL
MYCOBACTERIUM SPEC QL CULT: NORMAL
MYCOBACTERIUM SPEC QL CULT: NORMAL
POTASSIUM SERPL-SCNC: 4.4 MMOL/L
SODIUM SERPL-SCNC: 142 MMOL/L

## 2018-06-21 PROCEDURE — 36415 COLL VENOUS BLD VENIPUNCTURE: CPT | Mod: PO

## 2018-06-21 PROCEDURE — 80197 ASSAY OF TACROLIMUS: CPT

## 2018-06-21 PROCEDURE — 80048 BASIC METABOLIC PNL TOTAL CA: CPT

## 2018-06-22 ENCOUNTER — PATIENT MESSAGE (OUTPATIENT)
Dept: TRANSPLANT | Facility: CLINIC | Age: 58
End: 2018-06-22

## 2018-06-22 DIAGNOSIS — Z94.2 LUNG REPLACED BY TRANSPLANT: ICD-10-CM

## 2018-06-22 LAB — TACROLIMUS BLD-MCNC: 7.7 NG/ML

## 2018-06-22 RX ORDER — TACROLIMUS 0.5 MG/1
3 CAPSULE ORAL EVERY 12 HOURS
Qty: 360 CAPSULE | Refills: 11 | Status: SHIPPED | OUTPATIENT
Start: 2018-06-22 | End: 2018-08-17 | Stop reason: SDUPTHER

## 2018-06-22 NOTE — TELEPHONE ENCOUNTER
Received written orders from Dr. Wolfe instructing patient to increase prograf to 3 mg bid from 2 mg bid.  Patient contacted and instructed to make the above dose change.  Patient will have repeat labs on Monday 6/25/18 in [place].  Patient repeated the changes and verbalized understanding of the above instructions.  My [facility] message also sent to patient as a reminder of the above dose change.

## 2018-06-25 ENCOUNTER — LAB VISIT (OUTPATIENT)
Dept: LAB | Facility: HOSPITAL | Age: 58
End: 2018-06-25
Attending: FAMILY MEDICINE
Payer: COMMERCIAL

## 2018-06-25 DIAGNOSIS — Z94.2 LUNG REPLACED BY TRANSPLANT: ICD-10-CM

## 2018-06-25 LAB
ANION GAP SERPL CALC-SCNC: 9 MMOL/L
BUN SERPL-MCNC: 27 MG/DL
CALCIUM SERPL-MCNC: 9.7 MG/DL
CHLORIDE SERPL-SCNC: 101 MMOL/L
CO2 SERPL-SCNC: 30 MMOL/L
CREAT SERPL-MCNC: 1 MG/DL
EST. GFR  (AFRICAN AMERICAN): >60 ML/MIN/1.73 M^2
EST. GFR  (NON AFRICAN AMERICAN): >60 ML/MIN/1.73 M^2
GLUCOSE SERPL-MCNC: 150 MG/DL
POTASSIUM SERPL-SCNC: 4.3 MMOL/L
SODIUM SERPL-SCNC: 140 MMOL/L

## 2018-06-25 PROCEDURE — 36415 COLL VENOUS BLD VENIPUNCTURE: CPT | Mod: PO

## 2018-06-25 PROCEDURE — 80197 ASSAY OF TACROLIMUS: CPT

## 2018-06-25 PROCEDURE — 80048 BASIC METABOLIC PNL TOTAL CA: CPT

## 2018-06-26 ENCOUNTER — PATIENT MESSAGE (OUTPATIENT)
Dept: TRANSPLANT | Facility: CLINIC | Age: 58
End: 2018-06-26

## 2018-06-26 LAB — TACROLIMUS BLD-MCNC: 11.2 NG/ML

## 2018-06-28 DIAGNOSIS — E11.65 TYPE 2 DIABETES MELLITUS WITH HYPERGLYCEMIA, WITH LONG-TERM CURRENT USE OF INSULIN: Chronic | ICD-10-CM

## 2018-06-28 DIAGNOSIS — Z79.4 TYPE 2 DIABETES MELLITUS WITH HYPERGLYCEMIA, WITH LONG-TERM CURRENT USE OF INSULIN: Chronic | ICD-10-CM

## 2018-06-29 RX ORDER — INSULIN LISPRO 100 [IU]/ML
INJECTION, SOLUTION INTRAVENOUS; SUBCUTANEOUS
Qty: 15 ML | Refills: 0 | Status: SHIPPED | OUTPATIENT
Start: 2018-06-29 | End: 2018-10-04

## 2018-06-29 NOTE — PROGRESS NOTES
CC:  Diabetes.     HPI: Martin Hendrix Jr. is a 57 y.o. male presents for visit. Previous patient of JULEE Bertrand NP. Last seen in 11/2017. This is his first visit with me.    The patient was diagnosed with Type 2 diabetes in ~2017 on labs.    A1c 6.7% 4/2017 before lung transplant.  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 During initial hospitalizations, BG well controlled without insulin. Repeat A1c >10%, Recently admitted 11/3/17 and discharged 11/8/17 for treatment of rejection with high dose steroids and discharged by endocrine team on insulin    Living back home in [place].    DIABETES COMPLICATIONS: nephropathy (micro x1)     STANDARDS of CARE:  Statin:  Yes pravastatin 20 mg daily   ACEI or ARB:  No - will need lisinopril if MAC >30 on repeat in 3 months. Caution with starting now based on high normal K  ASA:  Yes - 81 mg daily   Last eye exam no - to schedule   Microalbumin/Creatinine ratio:  Lab Results   Component Value Date    MICALBCREAT 57.1 (H) 11/16/2017     CURRENT A1C:    Hemoglobin A1C   Date Value Ref Range Status   02/19/2018 5.5 4.0 - 5.6 % Final     Comment:     According to ADA guidelines, hemoglobin A1c <7.0% represents  optimal control in non-pregnant diabetic patients. Different  metrics may apply to specific patient populations.   Standards of Medical Care in Diabetes-2016.  For the purpose of screening for the presence of diabetes:  <5.7%     Consistent with the absence of diabetes  5.7-6.4%  Consistent with increasing risk for diabetes   (prediabetes)  >or=6.5%  Consistent with diabetes  Currently, no consensus exists for use of hemoglobin A1c  for diagnosis of diabetes for children.  This Hemoglobin A1c assay has significant interference with fetal   hemoglobin   (HbF). The results are invalid for patients with abnormal amounts of   HbF,   including those with known Hereditary Persistence   of Fetal Hemoglobin. Heterozygous hemoglobin variants (HbAS, HbAC,   HbAD, HbAE, HbA2) do  not significantly interfere with this assay;   however, presence of multiple variants in a sample may impact the %   interference.     11/05/2017 10.0 (H) 4.0 - 5.6 % Final     Comment:     According to ADA guidelines, hemoglobin A1c <7.0% represents  optimal control in non-pregnant diabetic patients. Different  metrics may apply to specific patient populations.   Standards of Medical Care in Diabetes-2016.  For the purpose of screening for the presence of diabetes:  <5.7%     Consistent with the absence of diabetes  5.7-6.4%  Consistent with increasing risk for diabetes   (prediabetes)  >or=6.5%  Consistent with diabetes  Currently, no consensus exists for use of hemoglobin A1c  for diagnosis of diabetes for children.  This Hemoglobin A1c assay has significant interference with fetal   hemoglobin   (HbF). The results are invalid for patients with abnormal amounts of   HbF,   including those with known Hereditary Persistence   of Fetal Hemoglobin. Heterozygous hemoglobin variants (HbAS, HbAC,   HbAD, HbAE, HbA2) do not significantly interfere with this assay;   however, presence of multiple variants in a sample may impact the %   interference.     04/24/2017 6.7 (H) 4.5 - 6.2 % Final     Comment:     According to ADA guidelines, hemoglobin A1C <7.0% represents  optimal control in non-pregnant diabetic patients.  Different  metrics may apply to specific populations.   Standards of Medical Care in Diabetes - 2016.  For the purpose of screening for the presence of diabetes:  <5.7%     Consistent with the absence of diabetes  5.7-6.4%  Consistent with increasing risk for diabetes   (prediabetes)  >or=6.5%  Consistent with diabetes  Currently no consensus exists for use of hemoglobin A1C  for diagnosis of diabetes for children.       GOAL A1C: <7%    DM MEDICATIONS USED IN THE PAST: Metformin, Glipizide, MDI while in hospital     CURRENT DIABETES MEDICATIONS: Metformin 1000 mg BID, Humalog 12 units AC plus correction  "scale (150 +1), Tresiba 18 units nightly    -- has not been giving himself insulin at lunch.     BLOOD GLUCOSE MONITORING:    Checking BG  2-3x daily, has not been checking at lunch  Oral recall:  AM: 130-140   Dinner: 120-140     HYPOGLYCEMIA:  No  Knows how to correct with 15 grams of carbs- juice, coke, or a peppermint.     MEALS:   Eating 2-3 meals per day   Snacks on fruit, chips  Water + lemon, rare juice    ROS:   Constitutional: Negative for weight change  Endocrine:  + improved polyuria, polydipsia, nocturia.  HENT: Denies neck swelling, lumps, horseness or trouble swallowing. Denies any personal or family history of thyroid cancer.    Eyes: Negative for visual disturbance.   Respiratory: Negative for cough or shortness or breath.  Gastrointestinal: Negative for nausea, diarrhea, vomiting, bloating.  + pancreatitis, hospitalized (~2009), denies gastroparesis.  Genitourinary: Negative for urgency, frequency, frequent urinary tract infections.  Neurological: Negative for syncope, no numbness/burning of extremities.    PE:  Constitutional: /80   Pulse 78   Ht 5' 11" (1.803 m)   Wt 99.9 kg (220 lb 3.8 oz)   BMI 30.72 kg/m²    Well developed, well nourished, NAD.  Neck:  No trachial deviation present, No neck masses noticed   Thyroid:  No thyromegaly present.  No thyroid tenderness.    Cardiovascular:    Auscultation:  No murmurs or abnormal sounds   Lower extremities:  No edema or cyanosis.   Respiratory:    Effort:  Normal, no use of accessory muscles.   Auscultation: breath sounds normal, no adventitious sounds.  Skin:    Inspection: no rashes, lesion or ulcers, + acanthosis nigracans.   Palpation: no induration or nodules.  Psychiatric:  Judgement and insight good with normal mood and affect.  FOOT EXAMINATION: Appropriate footwear, Foot exam deferred, done 11/2017.    Lab Results   Component Value Date    TSH 1.092 04/24/2017     ASSESSMENT and PLAN:  1. Type 2 diabetes mellitus with " hyperglycemia, with long-term current use of insulin  amLODIPine (NORVASC) 5 MG tablet    blood sugar diagnostic Strp    lancets Misc    Hemoglobin A1c    Comprehensive metabolic panel    Microalbumin/creatinine urine ratio    CBC auto differential    Fructosamine   2. Essential hypertension  amLODIPine (NORVASC) 5 MG tablet   3. Lung replaced by transplant     4. Pure hypercholesterolemia     5. Class 1 obesity with body mass index (BMI) of 30.0 to 30.9 in adult, unspecified obesity type, unspecified whether serious comorbidity present       Type 2 diabetes with micro x1 and hyperglycemia -   -- labs & urine today, can not change doses without more information.   Continue Metformin 1000 mg BID, Tresiba 18 units nightly, increase Humalog with breakfast to 12 units TID AC plus correction scale 150 +1. Encouraged compliance with checking BG & giving insulin at lunch.     Cannot use DPP-4 or GLP-1 medications due to pancreatitis, caution with SGLT-2 at this time, due to h/o AVILA and high BG      Instructed to monitor BG ac/hs, document on BG logs, and bring complete BG logs to all visits.     Microalbuminuria x1 - repeat with next labs, needs improved BG control    2- s/p lung transplant on immunosuppression and steroids - steroid taper as follows:  Prednisone 5 mg daily- to stay on this dose.   Has an appointment with Dr. Wolfe on 07/11.     3- Hyperlipidemia -  Continue Pravastatin 20 mg daily. Use of pravastatin due to potential interaction with valcyte wih lipitor    Obesity   -- encouraged dietary and lifestyle modifications   -- emphasized weight loss goals     Follow-up in about 3 months (around 10/2/2018).

## 2018-07-02 ENCOUNTER — OFFICE VISIT (OUTPATIENT)
Dept: GASTROENTEROLOGY | Facility: CLINIC | Age: 58
End: 2018-07-02
Payer: COMMERCIAL

## 2018-07-02 ENCOUNTER — TELEPHONE (OUTPATIENT)
Dept: ENDOSCOPY | Facility: HOSPITAL | Age: 58
End: 2018-07-02

## 2018-07-02 ENCOUNTER — OFFICE VISIT (OUTPATIENT)
Dept: ENDOCRINOLOGY | Facility: CLINIC | Age: 58
End: 2018-07-02
Payer: COMMERCIAL

## 2018-07-02 VITALS
HEIGHT: 71 IN | WEIGHT: 220.25 LBS | DIASTOLIC BLOOD PRESSURE: 80 MMHG | BODY MASS INDEX: 30.83 KG/M2 | SYSTOLIC BLOOD PRESSURE: 132 MMHG | HEART RATE: 78 BPM

## 2018-07-02 VITALS
DIASTOLIC BLOOD PRESSURE: 77 MMHG | HEIGHT: 71 IN | WEIGHT: 221.31 LBS | SYSTOLIC BLOOD PRESSURE: 125 MMHG | BODY MASS INDEX: 30.98 KG/M2 | HEART RATE: 93 BPM

## 2018-07-02 DIAGNOSIS — E78.00 PURE HYPERCHOLESTEROLEMIA: Chronic | ICD-10-CM

## 2018-07-02 DIAGNOSIS — I10 ESSENTIAL HYPERTENSION: ICD-10-CM

## 2018-07-02 DIAGNOSIS — Z94.2 LUNG REPLACED BY TRANSPLANT: ICD-10-CM

## 2018-07-02 DIAGNOSIS — E66.9 CLASS 1 OBESITY WITH BODY MASS INDEX (BMI) OF 30.0 TO 30.9 IN ADULT, UNSPECIFIED OBESITY TYPE, UNSPECIFIED WHETHER SERIOUS COMORBIDITY PRESENT: ICD-10-CM

## 2018-07-02 DIAGNOSIS — K21.9 GASTROESOPHAGEAL REFLUX DISEASE, ESOPHAGITIS PRESENCE NOT SPECIFIED: Primary | ICD-10-CM

## 2018-07-02 DIAGNOSIS — Z79.4 TYPE 2 DIABETES MELLITUS WITH HYPERGLYCEMIA, WITH LONG-TERM CURRENT USE OF INSULIN: Primary | Chronic | ICD-10-CM

## 2018-07-02 DIAGNOSIS — R05.3 CHRONIC COUGH: ICD-10-CM

## 2018-07-02 DIAGNOSIS — E11.65 TYPE 2 DIABETES MELLITUS WITH HYPERGLYCEMIA, WITH LONG-TERM CURRENT USE OF INSULIN: Primary | Chronic | ICD-10-CM

## 2018-07-02 PROCEDURE — 99214 OFFICE O/P EST MOD 30 MIN: CPT | Mod: S$GLB,,, | Performed by: NURSE PRACTITIONER

## 2018-07-02 PROCEDURE — 99999 PR PBB SHADOW E&M-EST. PATIENT-LVL III: CPT | Mod: PBBFAC,,, | Performed by: NURSE PRACTITIONER

## 2018-07-02 PROCEDURE — 99204 OFFICE O/P NEW MOD 45 MIN: CPT | Mod: S$GLB,,, | Performed by: INTERNAL MEDICINE

## 2018-07-02 PROCEDURE — 99999 PR PBB SHADOW E&M-EST. PATIENT-LVL III: CPT | Mod: PBBFAC,,, | Performed by: INTERNAL MEDICINE

## 2018-07-02 RX ORDER — AMLODIPINE BESYLATE 5 MG/1
5 TABLET ORAL DAILY
Qty: 90 TABLET | Refills: 3 | Status: SHIPPED | OUTPATIENT
Start: 2018-07-02 | End: 2018-11-13 | Stop reason: SDUPTHER

## 2018-07-02 RX ORDER — PHENYLEPHRINE HCL 10 MG
500 TABLET ORAL DAILY
COMMUNITY
End: 2018-08-27

## 2018-07-02 RX ORDER — LANCETS
EACH MISCELLANEOUS
Qty: 350 EACH | Refills: 3 | Status: SHIPPED | OUTPATIENT
Start: 2018-07-02

## 2018-07-02 NOTE — LETTER
July 5, 2018      Darrick Wolfe MD  [address]  [place] [address]           [facility] Gastroenter[facility]  [address]  [place] [address]  Phone: [phone]  Fax: [phone]          Patient: Martin Hendrix Jr.   MR Number: [id number]   YOB: 1960   Date of Visit: 7/2/2018       Dear Dr. Darrick Wolfe:    Thank you for referring Martin Hendrix to me for evaluation. Attached you will find relevant portions of my assessment and plan of care.    If you have questions, please do not hesitate to call me. I look forward to following Martin Hendrix along with you.    Sincerely,    Marshal Morrow  CC:  No Recipients    If you would like to receive this communication electronically, please contact [email or web address] or [phone] to request more information on [employer] Interactive Link access.    For providers and/or their staff who would like to refer a patient to [facility], please contact us through our one-stop-shop provider referral line, Daphne Lynne, at [phone].    If you feel you have received this communication in error or would no longer like to receive these types of communications, please e-mail [email or web address]

## 2018-07-11 ENCOUNTER — LAB VISIT (OUTPATIENT)
Dept: LAB | Facility: HOSPITAL | Age: 58
End: 2018-07-11
Attending: INTERNAL MEDICINE
Payer: COMMERCIAL

## 2018-07-11 ENCOUNTER — HOSPITAL ENCOUNTER (OUTPATIENT)
Dept: RADIOLOGY | Facility: HOSPITAL | Age: 58
Discharge: HOME | End: 2018-07-11
Attending: INTERNAL MEDICINE
Payer: COMMERCIAL

## 2018-07-11 ENCOUNTER — HOSPITAL ENCOUNTER (OUTPATIENT)
Dept: PULMONOLOGY | Facility: HOSPITAL | Age: 58
Discharge: HOME | End: 2018-07-11
Attending: INTERNAL MEDICINE
Payer: COMMERCIAL

## 2018-07-11 ENCOUNTER — OFFICE VISIT (OUTPATIENT)
Dept: TRANSPLANT | Facility: CLINIC | Age: 58
End: 2018-07-11
Payer: COMMERCIAL

## 2018-07-11 VITALS
HEIGHT: 70 IN | TEMPERATURE: 96 F | RESPIRATION RATE: 20 BRPM | BODY MASS INDEX: 31.5 KG/M2 | OXYGEN SATURATION: 97 % | WEIGHT: 220 LBS | HEART RATE: 80 BPM | SYSTOLIC BLOOD PRESSURE: 120 MMHG | DIASTOLIC BLOOD PRESSURE: 77 MMHG

## 2018-07-11 DIAGNOSIS — J38.3 VOCAL CORD DYSFUNCTION: ICD-10-CM

## 2018-07-11 DIAGNOSIS — E83.42 HYPOMAGNESEMIA: ICD-10-CM

## 2018-07-11 DIAGNOSIS — Z94.2 LUNG REPLACED BY TRANSPLANT: Primary | ICD-10-CM

## 2018-07-11 DIAGNOSIS — E11.65 TYPE 2 DIABETES MELLITUS WITH HYPERGLYCEMIA, WITH LONG-TERM CURRENT USE OF INSULIN: Chronic | ICD-10-CM

## 2018-07-11 DIAGNOSIS — I27.29 PULMONARY HYPERTENSION ASSOCIATED WITH SARCOIDOSIS: ICD-10-CM

## 2018-07-11 DIAGNOSIS — D86.9 PULMONARY HYPERTENSION ASSOCIATED WITH SARCOIDOSIS: ICD-10-CM

## 2018-07-11 DIAGNOSIS — Z94.2 LUNG REPLACED BY TRANSPLANT: ICD-10-CM

## 2018-07-11 DIAGNOSIS — K21.9 GASTROESOPHAGEAL REFLUX DISEASE, ESOPHAGITIS PRESENCE NOT SPECIFIED: ICD-10-CM

## 2018-07-11 DIAGNOSIS — D86.0 SARCOIDOSIS OF LUNG: ICD-10-CM

## 2018-07-11 DIAGNOSIS — Z48.298 ENCOUNTER FOLLOWING ORGAN TRANSPLANT: Primary | ICD-10-CM

## 2018-07-11 DIAGNOSIS — Z79.2 PROPHYLACTIC ANTIBIOTIC: ICD-10-CM

## 2018-07-11 DIAGNOSIS — Z79.4 TYPE 2 DIABETES MELLITUS WITH HYPERGLYCEMIA, WITH LONG-TERM CURRENT USE OF INSULIN: Chronic | ICD-10-CM

## 2018-07-11 DIAGNOSIS — Z76.82 AWAITING ORGAN TRANSPLANT: ICD-10-CM

## 2018-07-11 DIAGNOSIS — D84.9 IMMUNOSUPPRESSION: ICD-10-CM

## 2018-07-11 LAB
ALBUMIN SERPL BCP-MCNC: 4.2 G/DL
ALBUMIN SERPL BCP-MCNC: 4.2 G/DL
ALP SERPL-CCNC: 78 U/L
ALP SERPL-CCNC: 78 U/L
ALT SERPL W/O P-5'-P-CCNC: 18 U/L
ALT SERPL W/O P-5'-P-CCNC: 18 U/L
ANION GAP SERPL CALC-SCNC: 9 MMOL/L
ANION GAP SERPL CALC-SCNC: 9 MMOL/L
ANISOCYTOSIS BLD QL SMEAR: SLIGHT
ANISOCYTOSIS BLD QL SMEAR: SLIGHT
AST SERPL-CCNC: 19 U/L
AST SERPL-CCNC: 19 U/L
BASOPHILS NFR BLD: 0 %
BASOPHILS NFR BLD: 0 %
BILIRUB SERPL-MCNC: 0.7 MG/DL
BILIRUB SERPL-MCNC: 0.7 MG/DL
BUN SERPL-MCNC: 25 MG/DL
BUN SERPL-MCNC: 25 MG/DL
CALCIUM SERPL-MCNC: 9.5 MG/DL
CALCIUM SERPL-MCNC: 9.5 MG/DL
CHLORIDE SERPL-SCNC: 101 MMOL/L
CHLORIDE SERPL-SCNC: 101 MMOL/L
CLASS I ANTIBODIES - LUMINEX: NEGATIVE
CLASS I ANTIBODY COMMENTS - LUMINEX: NORMAL
CLASS II ANTIBODY COMMENTS - LUMINEX: NORMAL
CO2 SERPL-SCNC: 30 MMOL/L
CO2 SERPL-SCNC: 30 MMOL/L
CREAT SERPL-MCNC: 1.2 MG/DL
CREAT SERPL-MCNC: 1.2 MG/DL
DIFFERENTIAL METHOD: ABNORMAL
DIFFERENTIAL METHOD: ABNORMAL
DSA1 TESTING DATE: NORMAL
DSA12 TESTING DATE: NORMAL
DSA2 TESTING DATE: NORMAL
EOSINOPHIL NFR BLD: 2 %
EOSINOPHIL NFR BLD: 2 %
ERYTHROCYTE [DISTWIDTH] IN BLOOD BY AUTOMATED COUNT: 12.9 %
ERYTHROCYTE [DISTWIDTH] IN BLOOD BY AUTOMATED COUNT: 12.9 %
EST. GFR  (AFRICAN AMERICAN): >60 ML/MIN/1.73 M^2
EST. GFR  (AFRICAN AMERICAN): >60 ML/MIN/1.73 M^2
EST. GFR  (NON AFRICAN AMERICAN): >60 ML/MIN/1.73 M^2
EST. GFR  (NON AFRICAN AMERICAN): >60 ML/MIN/1.73 M^2
ESTIMATED AVG GLUCOSE: 146 MG/DL
GLUCOSE SERPL-MCNC: 150 MG/DL
GLUCOSE SERPL-MCNC: 150 MG/DL
HBA1C MFR BLD HPLC: 6.7 %
HCT VFR BLD AUTO: 37.1 %
HCT VFR BLD AUTO: 37.1 %
HGB BLD-MCNC: 12.1 G/DL
HGB BLD-MCNC: 12.1 G/DL
HYPOCHROMIA BLD QL SMEAR: ABNORMAL
HYPOCHROMIA BLD QL SMEAR: ABNORMAL
IMM GRANULOCYTES # BLD AUTO: ABNORMAL K/UL
IMM GRANULOCYTES # BLD AUTO: ABNORMAL K/UL
IMM GRANULOCYTES NFR BLD AUTO: ABNORMAL %
IMM GRANULOCYTES NFR BLD AUTO: ABNORMAL %
LYMPHOCYTES NFR BLD: 3 %
LYMPHOCYTES NFR BLD: 3 %
MAGNESIUM SERPL-MCNC: 1.3 MG/DL
MCH RBC QN AUTO: 29.3 PG
MCH RBC QN AUTO: 29.3 PG
MCHC RBC AUTO-ENTMCNC: 32.6 G/DL
MCHC RBC AUTO-ENTMCNC: 32.6 G/DL
MCV RBC AUTO: 90 FL
MCV RBC AUTO: 90 FL
MONOCYTES NFR BLD: 6 %
MONOCYTES NFR BLD: 6 %
MYELOCYTES NFR BLD MANUAL: 3 %
MYELOCYTES NFR BLD MANUAL: 3 %
NEUTROPHILS NFR BLD: 82 %
NEUTROPHILS NFR BLD: 82 %
NEUTS BAND NFR BLD MANUAL: 4 %
NEUTS BAND NFR BLD MANUAL: 4 %
NRBC BLD-RTO: 0 /100 WBC
NRBC BLD-RTO: 0 /100 WBC
OVALOCYTES BLD QL SMEAR: ABNORMAL
OVALOCYTES BLD QL SMEAR: ABNORMAL
PLATELET # BLD AUTO: 380 K/UL
PLATELET # BLD AUTO: 380 K/UL
PLATELET BLD QL SMEAR: ABNORMAL
PLATELET BLD QL SMEAR: ABNORMAL
PMV BLD AUTO: 8.7 FL
PMV BLD AUTO: 8.7 FL
POIKILOCYTOSIS BLD QL SMEAR: SLIGHT
POIKILOCYTOSIS BLD QL SMEAR: SLIGHT
POLYCHROMASIA BLD QL SMEAR: ABNORMAL
POLYCHROMASIA BLD QL SMEAR: ABNORMAL
POTASSIUM SERPL-SCNC: 3.9 MMOL/L
POTASSIUM SERPL-SCNC: 3.9 MMOL/L
PRE FEV1 FVC: 82
PRE FEV1: 2.73
PRE FVC: 3.33
PREDICTED FEV1 FVC: 80
PREDICTED FEV1: 3.71
PREDICTED FVC: 4.57
PROT SERPL-MCNC: 7 G/DL
PROT SERPL-MCNC: 7 G/DL
RBC # BLD AUTO: 4.13 M/UL
RBC # BLD AUTO: 4.13 M/UL
SERUM COLLECTION DT - LUMINEX CLASS I: NORMAL
SERUM COLLECTION DT - LUMINEX CLASS II: NORMAL
SODIUM SERPL-SCNC: 140 MMOL/L
SODIUM SERPL-SCNC: 140 MMOL/L
TACROLIMUS BLD-MCNC: 14.7 NG/ML
WBC # BLD AUTO: 9.32 K/UL
WBC # BLD AUTO: 9.32 K/UL

## 2018-07-11 PROCEDURE — 83036 HEMOGLOBIN GLYCOSYLATED A1C: CPT | Mod: NTX

## 2018-07-11 PROCEDURE — 99214 OFFICE O/P EST MOD 30 MIN: CPT | Mod: 25,S$GLB,, | Performed by: INTERNAL MEDICINE

## 2018-07-11 PROCEDURE — 86832 HLA CLASS I HIGH DEFIN QUAL: CPT | Mod: 91,PO,NTX

## 2018-07-11 PROCEDURE — 80197 ASSAY OF TACROLIMUS: CPT

## 2018-07-11 PROCEDURE — 82985 ASSAY OF GLYCATED PROTEIN: CPT

## 2018-07-11 PROCEDURE — 71046 X-RAY EXAM CHEST 2 VIEWS: CPT | Mod: 26,,, | Performed by: RADIOLOGY

## 2018-07-11 PROCEDURE — 86833 HLA CLASS II HIGH DEFIN QUAL: CPT | Mod: PO,NTX

## 2018-07-11 PROCEDURE — 83735 ASSAY OF MAGNESIUM: CPT | Mod: NTX

## 2018-07-11 PROCEDURE — 80053 COMPREHEN METABOLIC PANEL: CPT

## 2018-07-11 PROCEDURE — 99999 PR PBB SHADOW E&M-EST. PATIENT-LVL IV: CPT | Mod: PBBFAC,,,

## 2018-07-11 PROCEDURE — 71046 X-RAY EXAM CHEST 2 VIEWS: CPT | Mod: TC

## 2018-07-11 PROCEDURE — 94010 BREATHING CAPACITY TEST: CPT | Mod: 26,,, | Performed by: INTERNAL MEDICINE

## 2018-07-11 PROCEDURE — 36415 COLL VENOUS BLD VENIPUNCTURE: CPT

## 2018-07-11 PROCEDURE — 86977 RBC SERUM PRETX INCUBJ/INHIB: CPT | Mod: 91,PO,TXP

## 2018-07-11 NOTE — PROGRESS NOTES
LUNG TRANSPLANT RECIPIENT FOLLOW-UP    Reason for Visit: Follow-up after lung transplantation.                               Date of Transplant: 8/22/17     Reason for Transplant: Sarcoidosis with pulmonary hypertension     Type of Transplant: bilateral sequential lung     CMV Status: D+ / R-      Major Complications:   1. Left vocal cord dysfunction  2. 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 is a 57 y.o. year old male with the above listed transplant history who presents today for routine follow up. Patient reports doing well over the last month with no recent illnesses, ER/urgent care visits, antibiotic use. Patient continues to report dry cough and MARTINEZ that is unchanged from baseline. Patient reports that he notices his cough is exacerbated by dust. Patient reports that he is continuing to stay more active with work and walking. Patient reports noticing increasing hoarseness and having a weaker voice over the last 2 weeks. Denies sore throat, dysphagia, sinus congestion, post nasal drainage. Patient last received left vocal fold injection augmentation with hyaluronic acid on 9/29/2017 by Dr. Veliz.     Review of Systems   Constitutional: Negative for chills, diaphoresis, fever, malaise/fatigue and weight loss.   HENT: Negative for congestion, ear discharge, ear pain, hearing loss, nosebleeds, sinus pain, sore throat and tinnitus.    Eyes: Negative for blurred vision, double vision, photophobia, pain, discharge and redness.   Respiratory: Positive for cough (intermittent, non-productive). Negative for hemoptysis, sputum production, shortness of breath, wheezing and stridor.    Cardiovascular: Positive for leg swelling (LLE > RLE). Negative for chest pain, palpitations, orthopnea, claudication and PND.   Gastrointestinal: Positive  "for abdominal pain (epigastric, intermittent). Negative for blood in stool, constipation, diarrhea, heartburn, melena, nausea and vomiting.   Genitourinary: Negative for dysuria, flank pain, frequency, hematuria and urgency.   Musculoskeletal: Negative for back pain, falls, joint pain, myalgias and neck pain.   Skin: Negative for itching and rash.   Neurological: Negative for dizziness, tingling, tremors, sensory change, speech change, focal weakness, seizures, loss of consciousness, weakness and headaches.   Endo/Heme/Allergies: Negative for environmental allergies and polydipsia. Does not bruise/bleed easily.   Psychiatric/Behavioral: Negative for depression, hallucinations, memory loss, substance abuse and suicidal ideas. The patient is not nervous/anxious and does not have insomnia.      /77   Pulse 80   Temp 96.2 °F (35.7 °C) (Oral)   Resp 20   Ht 5' 10" (1.778 m)   Wt 99.8 kg (220 lb)   SpO2 97%   BMI 31.57 kg/m²     Physical Exam   Constitutional: He is oriented to person, place, and time and well-developed, well-nourished, and in no distress.   HENT:   Head: Normocephalic and atraumatic.   Nose: Nose normal.   Mouth/Throat: Oropharynx is clear and moist.   Eyes: Conjunctivae and EOM are normal.   Neck: Normal range of motion. Neck supple.   Cardiovascular: Normal rate, regular rhythm, normal heart sounds and intact distal pulses.  Exam reveals no gallop and no friction rub.    No murmur heard.  Pulmonary/Chest: Effort normal and breath sounds normal. No respiratory distress. He has no wheezes. He has no rales. He exhibits no tenderness.   Abdominal: Soft. He exhibits no distension. There is no tenderness.   Musculoskeletal: Normal range of motion. He exhibits edema (BLE to mid-calf).   Neurological: He is alert and oriented to person, place, and time. Gait normal.   Skin: Skin is warm and dry.   Psychiatric: Mood, memory, affect and judgment normal.   Nursing note and vitals " reviewed.    Labs:  cbc, cmp, tacrolimus Latest Ref Rng & Units 6/25/2018 7/11/2018 7/11/2018   TACROLIMUS LVL 5.0 - 15.0 ng/mL 11.2 - -   WHITE BLOOD CELL COUNT 3.90 - 12.70 K/uL - 9.32 9.32   RBC 4.60 - 6.20 M/uL - 4.13(L) 4.13(L)   HEMOGLOBIN 14.0 - 18.0 g/dL - 12.1(L) 12.1(L)   HEMATOCRIT 40.0 - 54.0 % - 37.1(L) 37.1(L)   MCV 82 - 98 fL - 90 90   MCH 27.0 - 31.0 pg - 29.3 29.3   MCHC 32.0 - 36.0 g/dL - 32.6 32.6   RDW 11.5 - 14.5 % - 12.9 12.9   PLATELETS 150 - 350 K/uL - 380(H) 380(H)   MPV 9.2 - 12.9 fL - 8.7(L) 8.7(L)   GRAN # 1.8 - 7.7 K/uL - - -   LYMPH # 1.0 - 4.8 K/uL - - -   MONO # 0.3 - 1.0 K/uL - - -   EOSINOPHIL% 0.0 - 8.0 % - - -   BASOPHIL% 0.0 - 1.9 % - - -   DIFFERENTIAL METHOD - - - -   SODIUM 136 - 145 mmol/L 140 140 140   POTASSIUM 3.5 - 5.1 mmol/L 4.3 3.9 3.9   CHLORIDE 95 - 110 mmol/L 101 101 101   CO2 23 - 29 mmol/L 30(H) 30(H) 30(H)   GLUCOSE 70 - 110 mg/dL 150(H) 150(H) 150(H)   BUN BLD 6 - 20 mg/dL 27(H) 25(H) 25(H)   CREATININE 0.5 - 1.4 mg/dL 1.0 1.2 1.2   CALCIUM 8.7 - 10.5 mg/dL 9.7 9.5 9.5   PROTEIN TOTAL 6.0 - 8.4 g/dL - 7.0 7.0   ALBUMIN 3.5 - 5.2 g/dL - 4.2 4.2   BILIRUBIN TOTAL 0.1 - 1.0 mg/dL - 0.7 0.7   ALK PHOS 55 - 135 U/L - 78 78   AST 10 - 40 U/L - 19 19   ALT 10 - 44 U/L - 18 18   ANION GAP 8 - 16 mmol/L 9 9 9   EGFR IF AFRICAN AMERICAN >60 mL/min/1.73 m:2 >60.0 >60.0 >60.0   EGFR IF NON-AFRICAN AMERICAN >60 mL/min/1.73 m:2 >60.0 >60.0 >60.0     Pulmonary Function Tests 7/11/2018 6/6/2018 5/7/2018 4/9/2018 3/19/2018 2/19/2018 1/16/2018   FVC 3.33 3.3 3.19 3.22 3.21 3.4 3.5   FEV1 2.73 2.72 2.56 2.6 2.61 2.79 2.83   TLC (liters) - - - - - - -   DLCO (ml/mmHg sec) - - - - - - -   FVC% 67 72 69 70 70 74 76   FEV1% 72 73 69 70 70 75 76   FEF 25-75 2.87 2.95 2.45 2.47 2.53 2.77 2.82   FEF 25-75% 89 78 65 65 67 73 74   TLC% - - - - - - -   RV - - - - - - -   RV% - - - - - - -   DLCO% - - - - - - -       Imaging:  Results for orders placed during the hospital encounter of  07/11/18   X-Ray Chest PA And Lateral    Narrative EXAMINATION:  XR CHEST PA AND LATERAL    CLINICAL HISTORY:  s/p bilateral lung transplant;  Lung transplant status    FINDINGS:  There is postoperative change.  Heart size is normal.  There is left basal scar.  There is DJD.  Right lung is clear.      Impression See above    No acute process seen.      Electronically signed by: Ulysses Miles MD  Date:    07/11/2018  Time:    08:26       Assessment:  1. Encounter following organ transplant    2. Lung replaced by transplant    3. Immunosuppression    4. Prophylactic antibiotic    5. Vocal cord dysfunction    6. Gastroesophageal reflux disease, esophagitis presence not specified      Plan:   1. Spirometry and FEV1 remain stable. CXR reviewed and is stable with no acute process noted. CBC/CMP reviewed.     2. Clinically stable from respiratory standpoint. No concern for graft dysfunction at this time. Patient to undergo routine 12 month surveillance bronchoscopy next month.    3. Continue prednisone 10 mg daily, MMF 1000 mg BID, and tacrolimus. Tacrolimus level pending, will dose adjust accordingly.     4. Continue Bactrim DS for PCP prophylaxis.     5. Given increasing hoarseness and difficulty with phonation, patient advised to schedule follow up with ENT, Dr. Veliz.      6. Continue famotidine 20 mg BID. EGD scheduled in August per Dr. Eisenberg for intermittent epigastric pain. Continue to follow up with GI, Dr. Eisenberg.     7. Follow up in lung transplant clinic in one month or earlier if needed.    Patricia Fowler PA-C  Lung Transplant

## 2018-07-12 LAB — FRUCTOSAMINE SERPL-SCNC: 322 UMOL /L (ref 151–300)

## 2018-08-06 ENCOUNTER — HOSPITAL ENCOUNTER (OUTPATIENT)
Facility: HOSPITAL | Age: 58
Discharge: HOME | End: 2018-08-06
Attending: INTERNAL MEDICINE | Admitting: INTERNAL MEDICINE
Payer: COMMERCIAL

## 2018-08-06 ENCOUNTER — ANESTHESIA (OUTPATIENT)
Dept: ENDOSCOPY | Facility: HOSPITAL | Age: 58
End: 2018-08-06
Payer: COMMERCIAL

## 2018-08-06 ENCOUNTER — SURGERY (OUTPATIENT)
Age: 58
End: 2018-08-06

## 2018-08-06 ENCOUNTER — ANESTHESIA EVENT (OUTPATIENT)
Dept: ENDOSCOPY | Facility: HOSPITAL | Age: 58
End: 2018-08-06
Payer: COMMERCIAL

## 2018-08-06 VITALS
HEIGHT: 71 IN | WEIGHT: 215 LBS | DIASTOLIC BLOOD PRESSURE: 72 MMHG | OXYGEN SATURATION: 100 % | BODY MASS INDEX: 30.1 KG/M2 | SYSTOLIC BLOOD PRESSURE: 120 MMHG | TEMPERATURE: 98 F | RESPIRATION RATE: 17 BRPM | HEART RATE: 82 BPM

## 2018-08-06 DIAGNOSIS — K21.9 GASTROESOPHAGEAL REFLUX DISEASE, ESOPHAGITIS PRESENCE NOT SPECIFIED: Primary | ICD-10-CM

## 2018-08-06 DIAGNOSIS — K21.9 GERD (GASTROESOPHAGEAL REFLUX DISEASE): ICD-10-CM

## 2018-08-06 LAB
POCT GLUCOSE: 207 MG/DL (ref 70–110)
POCT GLUCOSE: 220 MG/DL (ref 70–110)

## 2018-08-06 PROCEDURE — 82962 GLUCOSE BLOOD TEST: CPT | Performed by: INTERNAL MEDICINE

## 2018-08-06 PROCEDURE — 25000003 PHARM REV CODE 250: Performed by: INTERNAL MEDICINE

## 2018-08-06 PROCEDURE — 63600175 PHARM REV CODE 636 W HCPCS: Performed by: NURSE ANESTHETIST, CERTIFIED REGISTERED

## 2018-08-06 PROCEDURE — 91035 G-ESOPH REFLX TST W/ELECTROD: CPT | Mod: 26,,, | Performed by: INTERNAL MEDICINE

## 2018-08-06 PROCEDURE — 43235 EGD DIAGNOSTIC BRUSH WASH: CPT

## 2018-08-06 PROCEDURE — 91035 G-ESOPH REFLX TST W/ELECTROD: CPT | Performed by: INTERNAL MEDICINE

## 2018-08-06 PROCEDURE — 37000008 HC ANESTHESIA 1ST 15 MINUTES: Performed by: INTERNAL MEDICINE

## 2018-08-06 PROCEDURE — 25000003 PHARM REV CODE 250: Performed by: NURSE ANESTHETIST, CERTIFIED REGISTERED

## 2018-08-06 PROCEDURE — 43235 EGD DIAGNOSTIC BRUSH WASH: CPT | Mod: ,,, | Performed by: INTERNAL MEDICINE

## 2018-08-06 PROCEDURE — D9220A PRA ANESTHESIA: Mod: ANES,,, | Performed by: ANESTHESIOLOGY

## 2018-08-06 PROCEDURE — 27200942: Performed by: INTERNAL MEDICINE

## 2018-08-06 PROCEDURE — 37000009 HC ANESTHESIA EA ADD 15 MINS: Performed by: INTERNAL MEDICINE

## 2018-08-06 PROCEDURE — D9220A PRA ANESTHESIA: Mod: CRNA,,, | Performed by: NURSE ANESTHETIST, CERTIFIED REGISTERED

## 2018-08-06 RX ORDER — HYDROMORPHONE HYDROCHLORIDE 1 MG/ML
0.2 INJECTION, SOLUTION INTRAMUSCULAR; INTRAVENOUS; SUBCUTANEOUS EVERY 5 MIN PRN
Status: DISCONTINUED | OUTPATIENT
Start: 2018-08-06 | End: 2018-08-06 | Stop reason: HOSPADM

## 2018-08-06 RX ORDER — SODIUM CHLORIDE 9 MG/ML
INJECTION, SOLUTION INTRAVENOUS CONTINUOUS
Status: DISCONTINUED | OUTPATIENT
Start: 2018-08-06 | End: 2018-08-06 | Stop reason: HOSPADM

## 2018-08-06 RX ORDER — PROPOFOL 10 MG/ML
VIAL (ML) INTRAVENOUS
Status: DISCONTINUED | OUTPATIENT
Start: 2018-08-06 | End: 2018-08-06

## 2018-08-06 RX ORDER — GLYCOPYRROLATE 0.2 MG/ML
INJECTION INTRAMUSCULAR; INTRAVENOUS
Status: DISCONTINUED | OUTPATIENT
Start: 2018-08-06 | End: 2018-08-06

## 2018-08-06 RX ORDER — SODIUM CHLORIDE 0.9 % (FLUSH) 0.9 %
3 SYRINGE (ML) INJECTION
Status: DISCONTINUED | OUTPATIENT
Start: 2018-08-06 | End: 2018-08-06 | Stop reason: HOSPADM

## 2018-08-06 RX ORDER — LIDOCAINE HCL/PF 100 MG/5ML
SYRINGE (ML) INTRAVENOUS
Status: DISCONTINUED | OUTPATIENT
Start: 2018-08-06 | End: 2018-08-06

## 2018-08-06 RX ADMIN — SODIUM CHLORIDE: 0.9 INJECTION, SOLUTION INTRAVENOUS at 08:08

## 2018-08-06 RX ADMIN — PROPOFOL 50 MG: 10 INJECTION, EMULSION INTRAVENOUS at 09:08

## 2018-08-06 RX ADMIN — GLYCOPYRROLATE 0.2 MG: 0.2 INJECTION, SOLUTION INTRAMUSCULAR; INTRAVENOUS at 08:08

## 2018-08-06 RX ADMIN — PROPOFOL 100 MG: 10 INJECTION, EMULSION INTRAVENOUS at 09:08

## 2018-08-06 RX ADMIN — LIDOCAINE HYDROCHLORIDE 100 MG: 20 INJECTION, SOLUTION INTRAVENOUS at 09:08

## 2018-08-06 RX ADMIN — PROPOFOL 40 MG: 10 INJECTION, EMULSION INTRAVENOUS at 09:08

## 2018-08-06 NOTE — TRANSFER OF CARE
"Anesthesia Transfer of Care Note    Patient: Martin Hendrix Jr.    Procedure(s) Performed: Procedure(s) (LRB):  EGD (ESOPHAGOGASTRODUODENOSCOPY) (N/A)  PH MONITORING, ESOPHAGUS, WIRELESS, (OFF REFLUX MEDS) (N/A)    Patient location: PACU    Anesthesia Type: general    Transport from OR: Transported from OR on room air with adequate spontaneous ventilation    Post pain: adequate analgesia    Post assessment: no apparent anesthetic complications    Post vital signs: stable    Level of consciousness: awake, alert and oriented    Nausea/Vomiting: no nausea/vomiting    Complications: none    Transfer of care protocol was followed      Last vitals:   Visit Vitals  BP (!) 145/100   Pulse 96   Temp 37.2 °C (99 °F) (Temporal)   Resp 17   Ht 5' 11" (1.803 m)   Wt 97.5 kg (215 lb)   SpO2 98%   BMI 29.99 kg/m²     "

## 2018-08-06 NOTE — ANESTHESIA PREPROCEDURE EVALUATION
08/06/2018  Martin Hendrix Jr. is a 57 y.o., male here for EGD to eval GERD. Hx of lung tx 1 yr ago (sarcoid), doing well, has not required O2    Patient Active Problem List   Diagnosis    Sarcoidosis of lung    Secondary pulmonary hypertension    Chronic respiratory failure with hypoxia    Chronic pulmonary heart disease    Awaiting organ transplant    Pulmonary hypertension associated with sarcoidosis    Adrenal cortical steroids causing adverse effect in therapeutic use    Immunosuppression    Class 1 obesity with body mass index (BMI) of 30.0 to 30.9 in adult    Lung transplanted    Prophylactic antibiotic    Leukocytosis    Paroxysmal atrial fibrillation    Lung replaced by transplant    Dysphonia    Hyponatremia    Hyperkalemia    Unilateral complete vocal fold paralysis    Complication of transplanted lung    Acute rejection of lung transplant    LRTI (lower respiratory tract infection)    Infection due to Enterobacter aerogenes    Type 2 diabetes mellitus with hyperglycemia, with long-term current use of insulin    Pure hypercholesterolemia    Pulmonary aspergilloma    Encounter following organ transplant    GERD (gastroesophageal reflux disease)       Anesthesia Evaluation    I have reviewed the Patient Summary Reports.    I have reviewed the Nursing Notes.   I have reviewed the Medications.     Review of Systems  Anesthesia Hx:  No problems with previous Anesthesia    Cardiovascular:   Exercise tolerance: good Hx of pulm HTN prior to lung transplant   Pulmonary:   Sleep Apnea    Hepatic/GI:   GERD    Neurological:  Neurology Normal    Endocrine:   Diabetes        Physical Exam  General:  Well nourished    Airway/Jaw/Neck:  Airway Findings: Mouth Opening: Normal Tongue: Normal  General Airway Assessment: Adult  Mallampati: II  TM Distance: Normal, at least 6 cm        Chest/Lungs:  Chest/Lungs Findings: Clear to auscultation, Normal Respiratory Rate     Heart/Vascular:  Heart Findings: Rate: Normal  Rhythm: Regular Rhythm             Anesthesia Plan  Type of Anesthesia, risks & benefits discussed:  Anesthesia Type:  general, MAC  Patient's Preference:   Intra-op Monitoring Plan: standard ASA monitors  Intra-op Monitoring Plan Comments:   Post Op Pain Control Plan: multimodal analgesia and IV/PO Opioids PRN  Post Op Pain Control Plan Comments:   Induction:   IV  Beta Blocker:  Patient is not currently on a Beta-Blocker (No further documentation required).       Informed Consent: Patient understands risks and agrees with Anesthesia plan.  Questions answered. Anesthesia consent signed with patient.  ASA Score: 3     Day of Surgery Review of History & Physical:            Ready For Surgery From Anesthesia Perspective.

## 2018-08-06 NOTE — PLAN OF CARE
Plan of care discussed with pt. Understanding verbalized. Pt states can discuss results with his brother

## 2018-08-06 NOTE — [employer] PATIENT INSTRUCTIONS
Discharge Summary/Instructions after an Endoscopic Procedure  Patient Name: Martin Hendrix  Patient MRN: [id number]  Patient YOB: 1960 Monday, August 06, 2018  Ramu Eisenberg MD  RESTRICTIONS:  During your procedure today, you received medications for sedation.  These   medications may affect your judgment, balance and coordination.  Therefore,   for 24 hours, you have the following restrictions:   - DO NOT drive a car, operate machinery, make legal/financial decisions,   sign important papers or drink alcohol.    ACTIVITY:  Today: no heavy lifting, straining or running due to procedural   sedation/anesthesia.  The following day: return to full activity including work.  DIET:  Eat and drink normally unless instructed otherwise.     TREATMENT FOR COMMON SIDE EFFECTS:  - Mild abdominal pain, nausea, belching, bloating or excessive gas:  rest,   eat lightly and use a heating pad.  - Sore Throat: treat with throat lozenges and/or gargle with warm salt   water.  - Because air was used during the procedure, expelling large amounts of air   from your rectum or belching is normal.  - If a bowel prep was taken, you may not have a bowel movement for 1-3 days.    This is normal.  SYMPTOMS TO WATCH FOR AND REPORT TO YOUR PHYSICIAN:  1. Abdominal pain or bloating, other than gas cramps.  2. Chest pain.  3. Back pain.  4. Signs of infection such as: chills or fever occurring within 24 hours   after the procedure.  5. Rectal bleeding, which would show as bright red, maroon, or black stools.   (A tablespoon of blood from the rectum is not serious, especially if   hemorrhoids are present.)  6. Vomiting.  7. Weakness or dizziness.  GO DIRECTLY TO THE NEAREST EMERGENCY ROOM IF YOU HAVE ANY OF THE FOLLOWING:      Difficulty breathing              Chills and/or fever over 101 F   Persistent vomiting and/or vomiting blood   Severe abdominal pain   Severe chest pain   Black, tarry stools   Bleeding- more than one  tablespoon   Any other symptom or condition that you feel may need urgent attention  Your doctor recommends these additional instructions:  If any biopsies were taken, your doctors clinic will contact you in 1 to 2   weeks with any results.  - Discharge patient to home.   - Patient has a contact number available for emergencies.  The signs and   symptoms of potential delayed complications were discussed with the   patient.  Return to normal activities tomorrow.  Written discharge   instructions were provided to the patient.   - Resume previous diet.   - Continue present medications.   - Return to GI clinic.  For questions, problems or results please call your physician - Ramu Eisenberg MD at Work:  [phone].  [facility], EMERGENCY ROOM PHONE NUMBER: [phone]  IF A COMPLICATION OR EMERGENCY SITUATION ARISES AND YOU ARE UNABLE TO REACH   YOUR PHYSICIAN - GO DIRECTLY TO THE EMERGENCY ROOM.  Ramu Eisenberg MD  8/6/2018 9:18:50 AM  This report has been verified and signed electronically.  [employer]

## 2018-08-06 NOTE — H&P
Short Stay Endoscopy History and Physical    PCP - Sukhi Dunham Jr, MD    Procedure - EGD with Bravo pH probe placement  ASA - per anesthesia  Mallampati - per anesthesia  History of Anesthesia problems - no  Family history Anesthesia problems -  no   Plan of anesthesia - MAC    HPI:  This is a 57 y.o. male here for evaluation of suspected GERD        ROS:  Constitutional: No fevers, chills, No weight loss  CV: No chest pain  Pulm: No cough, No shortness of breath  Ophtho: No vision changes  GI: see HPI    Medical History:  has a past medical history of AVILA (acute kidney injury) (8/27/2017); Atrial fibrillation (8/23/2017); On home oxygen therapy; KRISTYN on CPAP; Osteopenia; Pancreatitis (2009); Pulmonary hypertension; Pure hypercholesterolemia (11/15/2017); Sarcoidosis; Shortness of breath; and Type 2 diabetes mellitus (11/5/2017).    Surgical History:  has a past surgical history that includes Abdominal surgery; Cholecystectomy; Lung biopsy; Chest tube insertion; Bronchoscopy; Lung transplant (08/2017); and Colonoscopy.    Family History: family history includes Diabetes in his brother and father; Hypertension in his mother; Kidney disease in his sister.. Otherwise no colon cancer, inflammatory bowel disease, or GI malignancies.    Social History:  reports that he has never smoked. He has never used smokeless tobacco. He reports that he does not drink alcohol or use drugs.    Review of patient's allergies indicates:  No Known Allergies    Medications:   Prescriptions Prior to Admission   Medication Sig Dispense Refill Last Dose    amLODIPine (NORVASC) 5 MG tablet Take 1 tablet (5 mg total) by mouth once daily. 90 tablet 3 8/5/2018 at Unknown time    aspirin (ECOTRIN) 81 MG EC tablet Take 1 tablet (81 mg total) by mouth once daily. 90 tablet 3 8/5/2018 at Unknown time    calcium-vitamin D tablet 600 mg-200 units Take 1 tablet by mouth 2 (two) times daily. 180 tablet 3 8/5/2018 at Unknown time    cinnamon bark  (CINNAMON) 500 mg capsule Take 500 mg by mouth once daily.   Past Week at Unknown time    famotidine (PEPCID) 20 MG tablet Take 1 tablet (20 mg total) by mouth 2 (two) times daily. 180 tablet 3 Past Month at Unknown time    folic acid (FOLVITE) 1 MG tablet Take 1 tablet (1 mg total) by mouth once daily. 90 tablet 3 8/5/2018 at Unknown time    furosemide (LASIX) 40 MG tablet Take 1 tablet (40 mg total) by mouth daily as needed.   8/5/2018 at Unknown time    HUMALOG KWIKPEN INSULIN 100 unit/mL InPn pen INJECT 12 UNITS UNDER THE SKIN DAILY WITH MEALS PLUS CORRECTION SCALE. MAXIMUM DAILY DOSE IS 60 UNITS DAILY. 15 mL 0 8/5/2018 at Unknown time    insulin degludec (TRESIBA FLEXTOUCH U-200) 200 unit/mL (3 mL) InPn Inject 18 Units into the skin every evening. 3 Syringe 3 8/5/2018 at Unknown time    magnesium oxide (MAG-OX) 400 mg tablet Take 1 tablet (400 mg total) by mouth 3 (three) times daily. 270 tablet 3 8/5/2018 at Unknown time    metFORMIN (GLUCOPHAGE-XR) 500 MG 24 hr tablet Take 2 tablets (1,000 mg total) by mouth 2 (two) times daily with meals. 120 tablet 11 8/5/2018 at Unknown time    multivitamin (THERAGRAN) per tablet Take 1 tablet by mouth once daily.   8/5/2018 at Unknown time    mycophenolate (CELLCEPT) 250 mg Cap Take 4 capsules (1,000 mg total) by mouth 2 (two) times daily. 240 capsule 11 8/5/2018 at Unknown time    pravastatin (PRAVACHOL) 20 MG tablet Take 1 tablet (20 mg total) by mouth once daily. 90 tablet 3 8/5/2018 at Unknown time    predniSONE (DELTASONE) 10 MG tablet Take 5 mg by mouth once daily.   8/5/2018 at Unknown time    tacrolimus (PROGRAF) 0.5 MG Cap Take 6 capsules (3 mg total) by mouth every 12 (twelve) hours. 360 capsule 11 8/5/2018 at Unknown time    blood sugar diagnostic Strp To check BG 4 times daily, to use with insurance preferred meter 350 strip 3 Taking    fluticasone (FLONASE) 50 mcg/actuation nasal spray 1 spray by Each Nare route once daily. 1 Bottle 3 More than  "a month at Unknown time    lancets Misc To check BG 4 times daily, to use with insurance preferred meter 350 each 3 Taking    pen needle, diabetic (BD ULTRA-FINE JIM PEN NEEDLES) 32 gauge x 5/32" Ndle Uses 4 times daily, on multiple daily insulin injections 350 each 3 Taking    sulfamethoxazole-trimethoprim 800-160mg (BACTRIM DS) 800-160 mg Tab Take 1 tablet by mouth every Mon, Wed, Fri. 45 tablet 3 8/3/2018       Physical Exam:    Vital Signs:   Vitals:    08/06/18 0843   BP: (!) 145/100   Pulse:    Resp:    Temp:        General Appearance: Well appearing in no acute distress  Eyes:    No scleral icterus  ENT: Neck supple, Lips, mucosa, and tongue normal; teeth and gums normal  Lungs: CTA anteriorly  Heart:  Regular rate, S1, S2 normal, no murmurs heard.  Abdomen: Soft, non tender, non distended with normal bowel sounds. No hepatosplenomegaly, ascites, or mass.      Labs:  Lab Results   Component Value Date    WBC 9.32 07/11/2018    WBC 9.32 07/11/2018    HGB 12.1 (L) 07/11/2018    HGB 12.1 (L) 07/11/2018    HCT 37.1 (L) 07/11/2018    HCT 37.1 (L) 07/11/2018     (H) 07/11/2018     (H) 07/11/2018    CHOL 203 (H) 02/19/2018    TRIG 108 02/19/2018    HDL 52 02/19/2018    ALT 18 07/11/2018    ALT 18 07/11/2018    AST 19 07/11/2018    AST 19 07/11/2018     07/11/2018     07/11/2018    K 3.9 07/11/2018    K 3.9 07/11/2018     07/11/2018     07/11/2018    CREATININE 1.2 07/11/2018    CREATININE 1.2 07/11/2018    BUN 25 (H) 07/11/2018    BUN 25 (H) 07/11/2018    CO2 30 (H) 07/11/2018    CO2 30 (H) 07/11/2018    TSH 1.092 04/24/2017    PSA 1.1 04/24/2017    INR 1.1 08/23/2017    HGBA1C 6.7 (H) 07/11/2018         Assessment:  57 y.o. male with suspected GERD.    Plan:  Proceed with EGD today with Bravo pH probe placement.  I have explained the risks and benefits of endoscopy procedures to the patient including but not limited to bleeding, perforation, infection, and death.  All " questions answered.      Ramu Eisenberg MD

## 2018-08-06 NOTE — ANESTHESIA POSTPROCEDURE EVALUATION
"Anesthesia Post Evaluation    Patient: Martin Hendrix Jr.    Procedure(s) Performed: Procedure(s) (LRB):  EGD (ESOPHAGOGASTRODUODENOSCOPY) (N/A)  PH MONITORING, ESOPHAGUS, WIRELESS, (OFF REFLUX MEDS) (N/A)    Final Anesthesia Type: general  Patient location during evaluation: PACU  Patient participation: Yes- Able to Participate  Level of consciousness: awake and alert  Post-procedure vital signs: reviewed and stable  Pain management: adequate  Airway patency: patent  PONV status at discharge: No PONV  Anesthetic complications: no      Cardiovascular status: blood pressure returned to baseline  Respiratory status: unassisted  Hydration status: euvolemic  Follow-up not needed.        Visit Vitals  /72   Pulse 82   Temp 36.8 °C (98.3 °F) (Temporal)   Resp 17   Ht 5' 11" (1.803 m)   Wt 97.5 kg (215 lb)   SpO2 100%   BMI 29.99 kg/m²       Pain/Radha Score: Pain Assessment Performed: Yes (8/6/2018 10:16 AM)  Presence of Pain: denies (8/6/2018 10:16 AM)  Radha Score: 10 (8/6/2018  9:46 AM)      "

## 2018-08-06 NOTE — DISCHARGE INSTRUCTIONS
Upper GI Endoscopy     During endoscopy, a long, flexible tube is used to view the inside of your upper GI tract.      Upper GI endoscopy allows your healthcare provider to look directly into the beginning of your gastrointestinal (GI) tract. The esophagus, stomach, and duodenum (the first part of the small intestine) make up the upper GI tract.   Before the exam  Follow these and any other instructions you are given before your endoscopy. If you dont follow the healthcare providers instructions carefully, the test may need to be canceled or done over:  · Don't eat or drink anything after midnight the night before your exam. If your exam is in the afternoon, drink only clear liquids in the morning. Don't eat or drink anything for 8 hours before the exam. In some cases, you may be able to take medicines with sips of water until 2 hours before the procedure. Speak with your healthcare provider about this.   · Bring your X-rays and any other test results you have.  · Because you will be sedated, arrange for an adult to drive you home after the exam.  · Tell your healthcare provider before the exam if you are taking any medicines or have any medical problems.  The procedure  Here is what to expect:  · You will lie on the endoscopy table. Usually patients lie on the left side.  · You will be monitored and given oxygen.  · Your throat may be numbed with a spray or gargle. You are given medicine through an intravenous (IV) line that will help you relax and remain comfortable. You may be awake or asleep during the procedure.  · The healthcare provider will put the endoscope in your mouth and down your esophagus. It is thinner than most pieces of food that you swallow. It will not affect your breathing. The medicine helps keep you from gagging.  · Air is put into your GI tract to expand it. It can make you burp.  · During the procedure, the healthcare provider can take biopsies (tissue samples), remove abnormalities,  such as polyps, or treat abnormalities through a variety of devices placed through the endoscope. You will not feel this.   · The endoscope carries images of your upper GI tract to a video screen. If you are awake, you may be able to look at the images.  · After the procedure is done, you will rest for a time. An adult must drive you home.  When to call your healthcare provider  Contact your healthcare provider if you have:  · Black or tarry stools, or blood in your stool  · Fever  · Pain in your belly that does not go away  · Nausea and vomiting, or vomiting blood   Date Last Reviewed: 7/1/2016  © 8755-0451 [employer]. [address] [place] [address], [place] [address]. All rights reserved. This information is not intended as a substitute for professional medical care. Always follow your healthcare professional's instructions.    PATIENT INSTRUCTIONS  POST-ANESTHESIA    IMMEDIATELY FOLLOWING SURGERY:  Do not drive or operate machinery for the first twenty four hours after surgery.  Do not make any important decisions for twenty four hours after surgery or while taking narcotic pain medications or sedatives.  If you develop intractable nausea and vomiting or a severe headache please notify your doctor immediately.    FOLLOW-UP:  Please make an appointment with your surgeon as instructed. You do not need to follow up with anesthesia unless specifically instructed to do so.      QUESTIONS?:  Please feel free to call your physician or the hospital  if you have any questions, and they will be happy to assist you.       [facility] Anesthesia Department  [address] [facility]  [phone]

## 2018-08-06 NOTE — PROGRESS NOTES
I called dr. dias and notified him of blood sugar of 207 currently. MD said no need to treat at this time.

## 2018-08-15 ENCOUNTER — HOSPITAL ENCOUNTER (OUTPATIENT)
Dept: RADIOLOGY | Facility: HOSPITAL | Age: 58
Discharge: HOME | End: 2018-08-15
Attending: INTERNAL MEDICINE
Payer: COMMERCIAL

## 2018-08-15 ENCOUNTER — HOSPITAL ENCOUNTER (OUTPATIENT)
Dept: PULMONOLOGY | Facility: HOSPITAL | Age: 58
Discharge: HOME | End: 2018-08-15
Attending: INTERNAL MEDICINE
Payer: COMMERCIAL

## 2018-08-15 ENCOUNTER — OFFICE VISIT (OUTPATIENT)
Dept: TRANSPLANT | Facility: CLINIC | Age: 58
End: 2018-08-15
Payer: COMMERCIAL

## 2018-08-15 VITALS
TEMPERATURE: 97 F | DIASTOLIC BLOOD PRESSURE: 85 MMHG | WEIGHT: 222 LBS | HEART RATE: 88 BPM | BODY MASS INDEX: 31.78 KG/M2 | HEIGHT: 70 IN | SYSTOLIC BLOOD PRESSURE: 138 MMHG | OXYGEN SATURATION: 99 % | RESPIRATION RATE: 20 BRPM

## 2018-08-15 DIAGNOSIS — K21.9 GASTROESOPHAGEAL REFLUX DISEASE, ESOPHAGITIS PRESENCE NOT SPECIFIED: ICD-10-CM

## 2018-08-15 DIAGNOSIS — Z79.2 PROPHYLACTIC ANTIBIOTIC: ICD-10-CM

## 2018-08-15 DIAGNOSIS — J38.3 VOCAL CORD DYSFUNCTION: ICD-10-CM

## 2018-08-15 DIAGNOSIS — Z94.2 LUNG REPLACED BY TRANSPLANT: ICD-10-CM

## 2018-08-15 DIAGNOSIS — Z48.298 ENCOUNTER FOLLOWING ORGAN TRANSPLANT: Primary | ICD-10-CM

## 2018-08-15 LAB
PRE FEV1 FVC: 80
PRE FEV1: 2.53
PRE FVC: 3.15
PREDICTED FEV1 FVC: 80
PREDICTED FEV1: 3.71
PREDICTED FVC: 4.57

## 2018-08-15 PROCEDURE — 99214 OFFICE O/P EST MOD 30 MIN: CPT | Mod: 25,S$GLB,, | Performed by: NURSE PRACTITIONER

## 2018-08-15 PROCEDURE — 94010 BREATHING CAPACITY TEST: CPT | Mod: 26,,, | Performed by: INTERNAL MEDICINE

## 2018-08-15 PROCEDURE — 71046 X-RAY EXAM CHEST 2 VIEWS: CPT | Mod: 26,,, | Performed by: RADIOLOGY

## 2018-08-15 PROCEDURE — 71046 X-RAY EXAM CHEST 2 VIEWS: CPT | Mod: TC

## 2018-08-15 PROCEDURE — 99999 PR PBB SHADOW E&M-EST. PATIENT-LVL V: CPT | Mod: PBBFAC,,, | Performed by: NURSE PRACTITIONER

## 2018-08-15 NOTE — PROGRESS NOTES
LUNG TRANSPLANT RECIPIENT FOLLOW-UP    Reason for Visit: Follow-up after lung transplantation.                               Date of Transplant: 8/22/17     Reason for Transplant: Sarcoidosis with pulmonary hypertension     Type of Transplant: bilateral sequential lung     CMV Status: D+ / R-      Major Complications:   1. Left vocal cord dysfunction  2. 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 is a 57 y.o. year old male with the above listed transplant history who presents today for routine follow up. Since his last visit, he reports doing well.  He denies any recent illnesses, ER/urgent care visits, antibiotic use. Denies sore throat, dysphagia, sinus congestion, post nasal drainage. He states that he has been doing well and feels good.  He recently had an EGD that was normal.     Review of Systems   Constitutional: Negative for chills, diaphoresis, fever, malaise/fatigue and weight loss.   HENT: Negative for congestion, ear discharge, ear pain, hearing loss, nosebleeds, sinus pain, sore throat and tinnitus.    Eyes: Negative for blurred vision, double vision, photophobia, pain, discharge and redness.   Respiratory: Negative for cough, hemoptysis, sputum production, shortness of breath, wheezing and stridor.    Cardiovascular: Negative for chest pain, palpitations, orthopnea, claudication, leg swelling and PND.   Gastrointestinal: Negative for abdominal pain, blood in stool, constipation, diarrhea, heartburn, melena, nausea and vomiting.   Genitourinary: Negative for dysuria, flank pain, frequency, hematuria and urgency.   Musculoskeletal: Negative for back pain, falls, joint pain, myalgias and neck pain.   Skin: Negative for itching and rash.   Neurological: Negative for dizziness, tingling, tremors, sensory change, speech change,  "focal weakness, seizures, loss of consciousness, weakness and headaches.   Endo/Heme/Allergies: Negative for environmental allergies and polydipsia. Does not bruise/bleed easily.   Psychiatric/Behavioral: Negative for depression, hallucinations, memory loss, substance abuse and suicidal ideas. The patient is not nervous/anxious and does not have insomnia.      /85   Pulse 88   Temp 96.9 °F (36.1 °C) (Oral)   Resp 20   Ht 5' 10" (1.778 m)   Wt 100.7 kg (222 lb)   SpO2 99%   BMI 31.85 kg/m²     Physical Exam   Constitutional: He is oriented to person, place, and time and well-developed, well-nourished, and in no distress.   HENT:   Head: Normocephalic and atraumatic.   Nose: Nose normal.   Mouth/Throat: Oropharynx is clear and moist.   Eyes: Conjunctivae and EOM are normal.   Neck: Normal range of motion. Neck supple.   Cardiovascular: Normal rate, regular rhythm, normal heart sounds and intact distal pulses. Exam reveals no gallop and no friction rub.   No murmur heard.  Pulmonary/Chest: Effort normal and breath sounds normal. No respiratory distress. He has no wheezes. He has no rales. He exhibits no tenderness.   Abdominal: Soft. He exhibits no distension. There is no tenderness.   Musculoskeletal: Normal range of motion.   Neurological: He is alert and oriented to person, place, and time. Gait normal.   Skin: Skin is warm and dry.   Psychiatric: Mood, memory, affect and judgment normal.   Nursing note and vitals reviewed.    Labs:  cbc, cmp, tacrolimus Latest Ref Rng & Units 7/11/2018 7/11/2018 8/15/2018   TACROLIMUS LVL 5.0 - 15.0 ng/mL 14.7 - -   WHITE BLOOD CELL COUNT 3.90 - 12.70 K/uL 9.32 9.32 8.31   RBC 4.60 - 6.20 M/uL 4.13(L) 4.13(L) 4.16(L)   HEMOGLOBIN 14.0 - 18.0 g/dL 12.1(L) 12.1(L) 12.2(L)   HEMATOCRIT 40.0 - 54.0 % 37.1(L) 37.1(L) 37.0(L)   MCV 82 - 98 fL 90 90 89   MCH 27.0 - 31.0 pg 29.3 29.3 29.3   MCHC 32.0 - 36.0 g/dL 32.6 32.6 33.0   RDW 11.5 - 14.5 % 12.9 12.9 13.1   PLATELETS 150 " - 350 K/uL 380(H) 380(H) 180   MPV 9.2 - 12.9 fL 8.7(L) 8.7(L) 9.4   GRAN # 1.8 - 7.7 K/uL - - -   LYMPH # 1.0 - 4.8 K/uL - - CANCELED   MONO # 0.3 - 1.0 K/uL - - CANCELED   EOSINOPHIL% 0.0 - 8.0 % 2.0 2.0 4.0   BASOPHIL% 0.0 - 1.9 % 0.0 0.0 0.0   DIFFERENTIAL METHOD - Manual Manual Manual   SODIUM 136 - 145 mmol/L 140 140 136   POTASSIUM 3.5 - 5.1 mmol/L 3.9 3.9 4.2   CHLORIDE 95 - 110 mmol/L 101 101 100   CO2 23 - 29 mmol/L 30(H) 30(H) 28   GLUCOSE 70 - 110 mg/dL 150(H) 150(H) 185(H)   BUN BLD 6 - 20 mg/dL 25(H) 25(H) 21(H)   CREATININE 0.5 - 1.4 mg/dL 1.2 1.2 0.9   CALCIUM 8.7 - 10.5 mg/dL 9.5 9.5 9.4   PROTEIN TOTAL 6.0 - 8.4 g/dL 7.0 7.0 6.6   ALBUMIN 3.5 - 5.2 g/dL 4.2 4.2 3.8   BILIRUBIN TOTAL 0.1 - 1.0 mg/dL 0.7 0.7 0.5   ALK PHOS 55 - 135 U/L 78 78 95   AST 10 - 40 U/L 19 19 29   ALT 10 - 44 U/L 18 18 33   ANION GAP 8 - 16 mmol/L 9 9 8   EGFR IF AFRICAN AMERICAN >60 mL/min/1.73 m:2 >60.0 >60.0 >60.0   EGFR IF NON-AFRICAN AMERICAN >60 mL/min/1.73 m:2 >60.0 >60.0 >60.0     Pulmonary Function Tests 8/15/2018 7/11/2018 6/6/2018 5/7/2018 4/9/2018 3/19/2018 2/19/2018   FVC 3.15 3.33 3.3 3.19 3.22 3.21 3.4   FEV1 2.53 2.73 2.72 2.56 2.6 2.61 2.79   TLC (liters) - - - - - - -   DLCO (ml/mmHg sec) - - - - - - -   FVC% 64 67 72 69 70 70 74   FEV1% 67 72 73 69 70 70 75   FEF 25-75 2.41 2.87 2.95 2.45 2.47 2.53 2.77   FEF 25-75% 74.9 89 78 65 65 67 73   TLC% - - - - - - -   RV - - - - - - -   RV% - - - - - - -   DLCO% - - - - - - -       Imaging:  Results for orders placed during the hospital encounter of 08/15/18   X-Ray Chest PA And Lateral    Narrative EXAMINATION:  XR CHEST PA AND LATERAL    CLINICAL HISTORY:  s/p bilateral lung transplant; Lung transplant status    TECHNIQUE:  PA and lateral views of the chest were performed.    COMPARISON:  Studies from 11 July, 6 June, 7 May and 19 April 2018    FINDINGS:  There is stable radiographic appearance of the cardiomediastinal shadow, the hilar regions and both lungs.   Calcified mediastinal and hilar nodes are again demonstrated.  Scarring and/or fibrosis is again demonstrated in the region of the left costophrenic angle with the lungs appearing fully expanded and clear.  No pleural effusion is demonstrated.  Sternotomy wire sutures are again demonstrated.  There is stable radiographic appearance of the included osseous structures.      Impression Stable radiographic appearance of the chest including postoperative findings with no evidence of acute process.      Electronically signed by: Bebo Garcia MD  Date:    08/15/2018  Time:    08:40         Assessment:  1. Encounter following organ transplant    2. Lung replaced by transplant    3. Prophylactic antibiotic    4. Vocal cord dysfunction    5. Gastroesophageal reflux disease, esophagitis presence not specified      Plan:   1. Spirometry and FEV1 slightly decreased from previous.  CXR reviewed and is stable with no acute process noted.  Clinically stable from respiratory standpoint. No concern for graft dysfunction at this time. He is to undergo routine 12 month surveillance bronchoscopy next week.    3. Continue prednisone 10 mg daily, MMF 1000 mg BID, and tacrolimus. .     4. Continue Bactrim DS for PCP prophylaxis.     5. Continue famotidine 20 mg BID. EGD normal.     6. Follow up in lung transplant clinic in one month or earlier if needed.    Irwin Celestin NP  Lung Transplant  [id number]

## 2018-08-15 NOTE — H&P (VIEW-ONLY)
LUNG TRANSPLANT RECIPIENT FOLLOW-UP    Reason for Visit: Follow-up after lung transplantation.                               Date of Transplant: 8/22/17     Reason for Transplant: Sarcoidosis with pulmonary hypertension     Type of Transplant: bilateral sequential lung     CMV Status: D+ / R-      Major Complications:   1. Left vocal cord dysfunction  2. 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 is a 57 y.o. year old male with the above listed transplant history who presents today for routine follow up. Since his last visit, he reports doing well.  He denies any recent illnesses, ER/urgent care visits, antibiotic use. Denies sore throat, dysphagia, sinus congestion, post nasal drainage. He states that he has been doing well and feels good.  He recently had an EGD that was normal.     Review of Systems   Constitutional: Negative for chills, diaphoresis, fever, malaise/fatigue and weight loss.   HENT: Negative for congestion, ear discharge, ear pain, hearing loss, nosebleeds, sinus pain, sore throat and tinnitus.    Eyes: Negative for blurred vision, double vision, photophobia, pain, discharge and redness.   Respiratory: Negative for cough, hemoptysis, sputum production, shortness of breath, wheezing and stridor.    Cardiovascular: Negative for chest pain, palpitations, orthopnea, claudication, leg swelling and PND.   Gastrointestinal: Negative for abdominal pain, blood in stool, constipation, diarrhea, heartburn, melena, nausea and vomiting.   Genitourinary: Negative for dysuria, flank pain, frequency, hematuria and urgency.   Musculoskeletal: Negative for back pain, falls, joint pain, myalgias and neck pain.   Skin: Negative for itching and rash.   Neurological: Negative for dizziness, tingling, tremors, sensory change, speech change,  "focal weakness, seizures, loss of consciousness, weakness and headaches.   Endo/Heme/Allergies: Negative for environmental allergies and polydipsia. Does not bruise/bleed easily.   Psychiatric/Behavioral: Negative for depression, hallucinations, memory loss, substance abuse and suicidal ideas. The patient is not nervous/anxious and does not have insomnia.      /85   Pulse 88   Temp 96.9 °F (36.1 °C) (Oral)   Resp 20   Ht 5' 10" (1.778 m)   Wt 100.7 kg (222 lb)   SpO2 99%   BMI 31.85 kg/m²     Physical Exam   Constitutional: He is oriented to person, place, and time and well-developed, well-nourished, and in no distress.   HENT:   Head: Normocephalic and atraumatic.   Nose: Nose normal.   Mouth/Throat: Oropharynx is clear and moist.   Eyes: Conjunctivae and EOM are normal.   Neck: Normal range of motion. Neck supple.   Cardiovascular: Normal rate, regular rhythm, normal heart sounds and intact distal pulses. Exam reveals no gallop and no friction rub.   No murmur heard.  Pulmonary/Chest: Effort normal and breath sounds normal. No respiratory distress. He has no wheezes. He has no rales. He exhibits no tenderness.   Abdominal: Soft. He exhibits no distension. There is no tenderness.   Musculoskeletal: Normal range of motion.   Neurological: He is alert and oriented to person, place, and time. Gait normal.   Skin: Skin is warm and dry.   Psychiatric: Mood, memory, affect and judgment normal.   Nursing note and vitals reviewed.    Labs:  cbc, cmp, tacrolimus Latest Ref Rng & Units 7/11/2018 7/11/2018 8/15/2018   TACROLIMUS LVL 5.0 - 15.0 ng/mL 14.7 - -   WHITE BLOOD CELL COUNT 3.90 - 12.70 K/uL 9.32 9.32 8.31   RBC 4.60 - 6.20 M/uL 4.13(L) 4.13(L) 4.16(L)   HEMOGLOBIN 14.0 - 18.0 g/dL 12.1(L) 12.1(L) 12.2(L)   HEMATOCRIT 40.0 - 54.0 % 37.1(L) 37.1(L) 37.0(L)   MCV 82 - 98 fL 90 90 89   MCH 27.0 - 31.0 pg 29.3 29.3 29.3   MCHC 32.0 - 36.0 g/dL 32.6 32.6 33.0   RDW 11.5 - 14.5 % 12.9 12.9 13.1   PLATELETS 150 " - 350 K/uL 380(H) 380(H) 180   MPV 9.2 - 12.9 fL 8.7(L) 8.7(L) 9.4   GRAN # 1.8 - 7.7 K/uL - - -   LYMPH # 1.0 - 4.8 K/uL - - CANCELED   MONO # 0.3 - 1.0 K/uL - - CANCELED   EOSINOPHIL% 0.0 - 8.0 % 2.0 2.0 4.0   BASOPHIL% 0.0 - 1.9 % 0.0 0.0 0.0   DIFFERENTIAL METHOD - Manual Manual Manual   SODIUM 136 - 145 mmol/L 140 140 136   POTASSIUM 3.5 - 5.1 mmol/L 3.9 3.9 4.2   CHLORIDE 95 - 110 mmol/L 101 101 100   CO2 23 - 29 mmol/L 30(H) 30(H) 28   GLUCOSE 70 - 110 mg/dL 150(H) 150(H) 185(H)   BUN BLD 6 - 20 mg/dL 25(H) 25(H) 21(H)   CREATININE 0.5 - 1.4 mg/dL 1.2 1.2 0.9   CALCIUM 8.7 - 10.5 mg/dL 9.5 9.5 9.4   PROTEIN TOTAL 6.0 - 8.4 g/dL 7.0 7.0 6.6   ALBUMIN 3.5 - 5.2 g/dL 4.2 4.2 3.8   BILIRUBIN TOTAL 0.1 - 1.0 mg/dL 0.7 0.7 0.5   ALK PHOS 55 - 135 U/L 78 78 95   AST 10 - 40 U/L 19 19 29   ALT 10 - 44 U/L 18 18 33   ANION GAP 8 - 16 mmol/L 9 9 8   EGFR IF AFRICAN AMERICAN >60 mL/min/1.73 m:2 >60.0 >60.0 >60.0   EGFR IF NON-AFRICAN AMERICAN >60 mL/min/1.73 m:2 >60.0 >60.0 >60.0     Pulmonary Function Tests 8/15/2018 7/11/2018 6/6/2018 5/7/2018 4/9/2018 3/19/2018 2/19/2018   FVC 3.15 3.33 3.3 3.19 3.22 3.21 3.4   FEV1 2.53 2.73 2.72 2.56 2.6 2.61 2.79   TLC (liters) - - - - - - -   DLCO (ml/mmHg sec) - - - - - - -   FVC% 64 67 72 69 70 70 74   FEV1% 67 72 73 69 70 70 75   FEF 25-75 2.41 2.87 2.95 2.45 2.47 2.53 2.77   FEF 25-75% 74.9 89 78 65 65 67 73   TLC% - - - - - - -   RV - - - - - - -   RV% - - - - - - -   DLCO% - - - - - - -       Imaging:  Results for orders placed during the hospital encounter of 08/15/18   X-Ray Chest PA And Lateral    Narrative EXAMINATION:  XR CHEST PA AND LATERAL    CLINICAL HISTORY:  s/p bilateral lung transplant; Lung transplant status    TECHNIQUE:  PA and lateral views of the chest were performed.    COMPARISON:  Studies from 11 July, 6 June, 7 May and 19 April 2018    FINDINGS:  There is stable radiographic appearance of the cardiomediastinal shadow, the hilar regions and both lungs.   Calcified mediastinal and hilar nodes are again demonstrated.  Scarring and/or fibrosis is again demonstrated in the region of the left costophrenic angle with the lungs appearing fully expanded and clear.  No pleural effusion is demonstrated.  Sternotomy wire sutures are again demonstrated.  There is stable radiographic appearance of the included osseous structures.      Impression Stable radiographic appearance of the chest including postoperative findings with no evidence of acute process.      Electronically signed by: Bebo Garcia MD  Date:    08/15/2018  Time:    08:40         Assessment:  1. Encounter following organ transplant    2. Lung replaced by transplant    3. Prophylactic antibiotic    4. Vocal cord dysfunction    5. Gastroesophageal reflux disease, esophagitis presence not specified      Plan:   1. Spirometry and FEV1 slightly decreased from previous.  CXR reviewed and is stable with no acute process noted.  Clinically stable from respiratory standpoint. No concern for graft dysfunction at this time. He is to undergo routine 12 month surveillance bronchoscopy next week.    3. Continue prednisone 10 mg daily, MMF 1000 mg BID, and tacrolimus. .     4. Continue Bactrim DS for PCP prophylaxis.     5. Continue famotidine 20 mg BID. EGD normal.     6. Follow up in lung transplant clinic in one month or earlier if needed.    Irwin Celestin NP  Lung Transplant  [id number]

## 2018-08-16 ENCOUNTER — PATIENT MESSAGE (OUTPATIENT)
Dept: TRANSPLANT | Facility: CLINIC | Age: 58
End: 2018-08-16

## 2018-08-17 DIAGNOSIS — B25.9 CYTOMEGALOVIRUS INFECTION, UNSPECIFIED CYTOMEGALOVIRAL INFECTION TYPE: Primary | ICD-10-CM

## 2018-08-17 DIAGNOSIS — Z94.2 LUNG REPLACED BY TRANSPLANT: ICD-10-CM

## 2018-08-17 RX ORDER — VALGANCICLOVIR 450 MG/1
900 TABLET, FILM COATED ORAL 2 TIMES DAILY
Qty: 120 TABLET | Refills: 11 | Status: SHIPPED | OUTPATIENT
Start: 2018-08-17 | End: 2018-12-11 | Stop reason: ALTCHOICE

## 2018-08-17 RX ORDER — TACROLIMUS 0.5 MG/1
2.5 CAPSULE ORAL EVERY 12 HOURS
Qty: 300 CAPSULE | Refills: 11 | Status: SHIPPED | OUTPATIENT
Start: 2018-08-17 | End: 2018-08-27 | Stop reason: DRUGHIGH

## 2018-08-17 NOTE — TELEPHONE ENCOUNTER
Received written orders from Dr. Wolfe to start patient on treatment dose of Valcyte for CMV viremia and to decrease prograf. Dose recommendations received from Thony Lyles PharmD and approved by Dr. Wolfe.  Valcyte 900 mg bid and Prograf 2.5 mg bid (from 3 mg bid).  Contacted patient and instructed him to make the above dose adjustment and to start the Valcyte.  Patient stated that he has Valcyte at home to start today and will make the Prograf change with tonight's dose.  Patient asks to send prescriptions to his local [facility] pharmacy.  Labs will be done on 8/21/18 to repeat Prograf, CBC, BMP.  Labs will also be done on 8/31/18 for CMV and CBC.  Patient noted all changes and verbalized understanding of the above information.  Pt did not have any further questions at this time.

## 2018-08-17 NOTE — TELEPHONE ENCOUNTER
----- Message from Thony Lyles PharmD sent at 8/17/2018  1:40 PM CDT -----  Tacrolimus 2.5 mg BID    Valganciclovir 900 mg BID      ----- Message -----  From: Darrick Wolfe MD  Sent: 8/17/2018   9:47 AM  To: Thony Lyles PharmD, Danielle BRICEÑO Do, RN    Start therapeutic doses of valganciclovir. We will have to decrease tac after all to target 10-12

## 2018-08-21 ENCOUNTER — LAB VISIT (OUTPATIENT)
Dept: LAB | Facility: HOSPITAL | Age: 58
End: 2018-08-21
Attending: FAMILY MEDICINE
Payer: COMMERCIAL

## 2018-08-21 DIAGNOSIS — Z94.2 LUNG REPLACED BY TRANSPLANT: ICD-10-CM

## 2018-08-21 LAB
ANION GAP SERPL CALC-SCNC: 11 MMOL/L
BASOPHILS # BLD AUTO: ABNORMAL K/UL
BASOPHILS NFR BLD: 0 %
BUN SERPL-MCNC: 23 MG/DL
CALCIUM SERPL-MCNC: 9.7 MG/DL
CHLORIDE SERPL-SCNC: 103 MMOL/L
CO2 SERPL-SCNC: 26 MMOL/L
CREAT SERPL-MCNC: 1.2 MG/DL
DIFFERENTIAL METHOD: ABNORMAL
EOSINOPHIL # BLD AUTO: ABNORMAL K/UL
EOSINOPHIL NFR BLD: 4 %
ERYTHROCYTE [DISTWIDTH] IN BLOOD BY AUTOMATED COUNT: 13.4 %
EST. GFR  (AFRICAN AMERICAN): >60 ML/MIN/1.73 M^2
EST. GFR  (NON AFRICAN AMERICAN): >60 ML/MIN/1.73 M^2
GLUCOSE SERPL-MCNC: 142 MG/DL
HCT VFR BLD AUTO: 38 %
HGB BLD-MCNC: 12.3 G/DL
IMM GRANULOCYTES # BLD AUTO: ABNORMAL K/UL
IMM GRANULOCYTES NFR BLD AUTO: ABNORMAL %
LYMPHOCYTES # BLD AUTO: ABNORMAL K/UL
LYMPHOCYTES NFR BLD: 14 %
MCH RBC QN AUTO: 29.1 PG
MCHC RBC AUTO-ENTMCNC: 32.4 G/DL
MCV RBC AUTO: 90 FL
MONOCYTES # BLD AUTO: ABNORMAL K/UL
MONOCYTES NFR BLD: 13 %
NEUTROPHILS NFR BLD: 69 %
NRBC BLD-RTO: 0 /100 WBC
PLATELET # BLD AUTO: 183 K/UL
PMV BLD AUTO: 9.1 FL
POTASSIUM SERPL-SCNC: 4.4 MMOL/L
RBC # BLD AUTO: 4.22 M/UL
SODIUM SERPL-SCNC: 140 MMOL/L
WBC # BLD AUTO: 4.67 K/UL

## 2018-08-21 PROCEDURE — 80048 BASIC METABOLIC PNL TOTAL CA: CPT

## 2018-08-21 PROCEDURE — 36415 COLL VENOUS BLD VENIPUNCTURE: CPT | Mod: PO

## 2018-08-21 PROCEDURE — 85027 COMPLETE CBC AUTOMATED: CPT

## 2018-08-21 PROCEDURE — 80197 ASSAY OF TACROLIMUS: CPT

## 2018-08-21 PROCEDURE — 85007 BL SMEAR W/DIFF WBC COUNT: CPT

## 2018-08-22 ENCOUNTER — TELEPHONE (OUTPATIENT)
Dept: TRANSPLANT | Facility: CLINIC | Age: 58
End: 2018-08-22

## 2018-08-22 ENCOUNTER — HOSPITAL ENCOUNTER (OUTPATIENT)
Facility: HOSPITAL | Age: 58
Discharge: HOME | End: 2018-08-22
Attending: INTERNAL MEDICINE | Admitting: INTERNAL MEDICINE
Payer: COMMERCIAL

## 2018-08-22 VITALS
WEIGHT: 210 LBS | TEMPERATURE: 98 F | OXYGEN SATURATION: 95 % | HEIGHT: 70 IN | RESPIRATION RATE: 18 BRPM | SYSTOLIC BLOOD PRESSURE: 145 MMHG | HEART RATE: 81 BPM | BODY MASS INDEX: 30.06 KG/M2 | DIASTOLIC BLOOD PRESSURE: 91 MMHG

## 2018-08-22 DIAGNOSIS — Z94.2 LUNG REPLACED BY TRANSPLANT: Primary | ICD-10-CM

## 2018-08-22 LAB
APPEARANCE FLD: NORMAL
BODY FLD TYPE: NORMAL
COLOR FLD: COLORLESS
EOSINOPHIL NFR FLD MANUAL: 1 %
LYMPHOCYTES NFR FLD MANUAL: 7 %
MONOS+MACROS NFR FLD MANUAL: 87 %
NEUTROPHILS NFR FLD MANUAL: 5 %
POCT GLUCOSE: 176 MG/DL (ref 70–110)
POCT GLUCOSE: 181 MG/DL (ref 70–110)
TACROLIMUS BLD-MCNC: 50.8 NG/ML
WBC # FLD: 38 /CU MM

## 2018-08-22 PROCEDURE — 87206 SMEAR FLUORESCENT/ACID STAI: CPT

## 2018-08-22 PROCEDURE — 63600175 PHARM REV CODE 636 W HCPCS: Performed by: INTERNAL MEDICINE

## 2018-08-22 PROCEDURE — 87102 FUNGUS ISOLATION CULTURE: CPT

## 2018-08-22 PROCEDURE — 27201011 HC FORCEPS DISPOSABLE: Performed by: INTERNAL MEDICINE

## 2018-08-22 PROCEDURE — 88305 TISSUE EXAM BY PATHOLOGIST: CPT | Mod: 26,,, | Performed by: PATHOLOGY

## 2018-08-22 PROCEDURE — 87205 SMEAR GRAM STAIN: CPT

## 2018-08-22 PROCEDURE — 87070 CULTURE OTHR SPECIMN AEROBIC: CPT | Mod: 59

## 2018-08-22 PROCEDURE — 88305 TISSUE EXAM BY PATHOLOGIST: CPT | Performed by: PATHOLOGY

## 2018-08-22 PROCEDURE — 99153 MOD SED SAME PHYS/QHP EA: CPT | Performed by: INTERNAL MEDICINE

## 2018-08-22 PROCEDURE — 99152 MOD SED SAME PHYS/QHP 5/>YRS: CPT | Performed by: INTERNAL MEDICINE

## 2018-08-22 PROCEDURE — 88313 SPECIAL STAINS GROUP 2: CPT | Mod: 26,,, | Performed by: PATHOLOGY

## 2018-08-22 PROCEDURE — 25000003 PHARM REV CODE 250: Performed by: INTERNAL MEDICINE

## 2018-08-22 PROCEDURE — 87015 SPECIMEN INFECT AGNT CONCNTJ: CPT

## 2018-08-22 PROCEDURE — 82962 GLUCOSE BLOOD TEST: CPT

## 2018-08-22 PROCEDURE — 87116 MYCOBACTERIA CULTURE: CPT

## 2018-08-22 PROCEDURE — 31623 DX BRONCHOSCOPE/BRUSH: CPT | Performed by: INTERNAL MEDICINE

## 2018-08-22 PROCEDURE — 31628 BRONCHOSCOPY/LUNG BX EACH: CPT | Performed by: INTERNAL MEDICINE

## 2018-08-22 PROCEDURE — 31623 DX BRONCHOSCOPE/BRUSH: CPT | Mod: 59,LT,, | Performed by: INTERNAL MEDICINE

## 2018-08-22 PROCEDURE — 31624 DX BRONCHOSCOPE/LAVAGE: CPT | Performed by: INTERNAL MEDICINE

## 2018-08-22 PROCEDURE — 88312 SPECIAL STAINS GROUP 1: CPT | Mod: 26,,, | Performed by: PATHOLOGY

## 2018-08-22 PROCEDURE — 88313 SPECIAL STAINS GROUP 2: CPT | Performed by: PATHOLOGY

## 2018-08-22 PROCEDURE — 87541 LEGION PNEUMO DNA AMP PROB: CPT

## 2018-08-22 PROCEDURE — 89051 BODY FLUID CELL COUNT: CPT

## 2018-08-22 PROCEDURE — 87305 ASPERGILLUS AG IA: CPT

## 2018-08-22 PROCEDURE — 88312 SPECIAL STAINS GROUP 1: CPT | Performed by: PATHOLOGY

## 2018-08-22 PROCEDURE — 31628 BRONCHOSCOPY/LUNG BX EACH: CPT | Mod: RT,,, | Performed by: INTERNAL MEDICINE

## 2018-08-22 PROCEDURE — 31624 DX BRONCHOSCOPE/LAVAGE: CPT | Mod: 59,RT,, | Performed by: INTERNAL MEDICINE

## 2018-08-22 RX ORDER — FENTANYL CITRATE 50 UG/ML
INJECTION, SOLUTION INTRAMUSCULAR; INTRAVENOUS CODE/TRAUMA/SEDATION MEDICATION
Status: COMPLETED | OUTPATIENT
Start: 2018-08-22 | End: 2018-08-22

## 2018-08-22 RX ORDER — MIDAZOLAM HYDROCHLORIDE 5 MG/ML
INJECTION INTRAMUSCULAR; INTRAVENOUS CODE/TRAUMA/SEDATION MEDICATION
Status: COMPLETED | OUTPATIENT
Start: 2018-08-22 | End: 2018-08-22

## 2018-08-22 RX ORDER — LIDOCAINE HYDROCHLORIDE 20 MG/ML
INJECTION, SOLUTION INFILTRATION; PERINEURAL CODE/TRAUMA/SEDATION MEDICATION
Status: COMPLETED | OUTPATIENT
Start: 2018-08-22 | End: 2018-08-22

## 2018-08-22 RX ORDER — LIDOCAINE HYDROCHLORIDE 10 MG/ML
INJECTION INFILTRATION; PERINEURAL CODE/TRAUMA/SEDATION MEDICATION
Status: COMPLETED | OUTPATIENT
Start: 2018-08-22 | End: 2018-08-22

## 2018-08-22 RX ORDER — LIDOCAINE HYDROCHLORIDE 20 MG/ML
SOLUTION OROPHARYNGEAL CODE/TRAUMA/SEDATION MEDICATION
Status: COMPLETED | OUTPATIENT
Start: 2018-08-22 | End: 2018-08-22

## 2018-08-22 RX ADMIN — MIDAZOLAM 4 MG: 5 INJECTION INTRAMUSCULAR; INTRAVENOUS at 07:08

## 2018-08-22 RX ADMIN — LIDOCAINE HYDROCHLORIDE 4 ML: 20 INJECTION, SOLUTION INFILTRATION; PERINEURAL at 07:08

## 2018-08-22 RX ADMIN — FENTANYL CITRATE 100 MCG: 50 INJECTION, SOLUTION INTRAMUSCULAR; INTRAVENOUS at 07:08

## 2018-08-22 RX ADMIN — LIDOCAINE HYDROCHLORIDE 5 ML: 20 SOLUTION OROPHARYNGEAL at 07:08

## 2018-08-22 RX ADMIN — LIDOCAINE HYDROCHLORIDE 8 ML: 10 INJECTION, SOLUTION INFILTRATION; PERINEURAL at 07:08

## 2018-08-22 RX ADMIN — TOPICAL ANESTHETIC 0.5 ML: 200 SPRAY DENTAL; PERIODONTAL at 07:08

## 2018-08-22 NOTE — PLAN OF CARE
No s/s of pain or nausea. Will d/c pt when able to tolerate PO fluids and chest xray completed and read.

## 2018-08-22 NOTE — INTERVAL H&P NOTE
The patient has been examined and the H&P has been reviewed:    I concur with the findings and no changes have occurred since H&P was written.    Anesthesia/Surgery risks, benefits and alternative options discussed and understood by patient/family.          Active Hospital Problems    Diagnosis  POA    Lung replaced by transplant [Z94.2]  Not Applicable      Resolved Hospital Problems   No resolved problems to display.

## 2018-08-22 NOTE — DISCHARGE INSTRUCTIONS
Flexible Bronchoscopy  A flexible bronchoscopy is an exam of the airways of your lungs. A thin, flexible tube called a bronchoscope is used. It has a light and small camera that allow the healthcare provider to view your airways.    Before your test  · Follow your healthcare provider's instructions carefully. If you dont, the exam may be canceled. Or you may need to take it again.  · If you are taking blood-thinning medicine, ask your healthcare provider if you should stop taking the medicine before this test.  · Have no food or drink for at least 8 hours before the test. Also, avoid smoking for 24 hours before the test.  · You will need to remove any dentures or removable devices from your mouth.  · Right before the test, you will be given sedating medicines to help you relax. The medicine may be given by an IV (intravenously) into one of your veins. In addition, your nose and throat may be numbed with a special spray to help prevent gagging and coughing.  · If you are having this test as an outpatient, make sure you have an adult friend or family member to drive you home.  During your test  Bronchoscopy takes 45 to 60 minutes and includes the following steps:  · You may be given medicine (anesthesia) so that you are unconscious or asleep during the procedure.  · The healthcare provider inserts the tube into your nose or mouth.  · If you have not been given anesthesia, you might feel a gagging sensation. To help ease this feeling, you will be told to swallow or take deep breaths. Your airway will remain open even with the tube in place. But you wont be able to talk.  · The provider checks your breathing passage. He or she may also remove tiny tissue samples for biopsy.  After your test  · You may have a mild sore throat or cough. Your voice may also be hoarse.  · Don't eat or drink until the anesthesia wears off.  · If you had a biopsy, you might see traces of blood being coughed up.  When to call your  healthcare provider  Call your healthcare provider right away if you have any of the following:  · Shortness of breath  · Chest pain  · Bleeding from your nose or throat  · Coughing up a large amount of blood  · A fever above 100.4°F (38°C) for more than 24 hours  Call 911  Call 911 if you have:  · Chest pain  · Severe shortness of breath   Date Last Reviewed: 12/1/2016  © 9062-9898 [employer]. [address] [place] [address], [place] [address]. All rights reserved. This information is not intended as a substitute for professional medical care. Always follow your healthcare professional's instructions.

## 2018-08-22 NOTE — DISCHARGE SUMMARY
[facility]  Lung Transplant  Discharge Summary      Patient Name: Martin Hendrix Jr.  MRN: [id number]  Admission Date: 8/22/2018  Hospital Length of Stay: 0 days  Discharge Date and Time: 08/22/2018 8:14 AM  Attending Physician: Darrick Wolfe MD   Discharging Provider: Darrick Wolfe MD  Primary Care Provider: Sukhi Dunham Jr, MD     HPI: Mr. Hendrix was admitted for 12 month surveillance bronchoscopy.    Procedure(s) (LRB):  flexible bronchoscopy with tissue biopsy CPT 23610 (N/A)     Hospital Course: Bronchoscopy was performed without complications.        Significant Diagnostic Studies: Bronchoscopy: see separate report    Pending Diagnostic Studies:     Procedure Component Value Units Date/Time    Aspergillus Antigen, BAL [[id number]] Collected:  08/22/18 0812    Order Status:  Sent Lab Status:  In process Updated:  08/22/18 0812    Specimen:  Body Fluid from Bronchial Alveolar Lavage (BAL)     Cytomegalovirus by Quantitative PCR, BAL [[id number]] Collected:  08/22/18 0812    Order Status:  Sent Lab Status:  In process Updated:  08/22/18 0812    Specimen:  Bronchial Alveolar Lavage (BAL)     RESPIRATORY PATHOGENS PANEL (TEM-PCR) Bronchial Alveolar Lavage [[id number]] Collected:  08/22/18 0812    Order Status:  Sent Lab Status:  In process Updated:  08/22/18 0812    Tissue Specimen To Pathology, Cardiology/Pulmonary [[id number]] Collected:  08/22/18 0811    Order Status:  Sent Lab Status:  No result     Specimen:  Bronchial from Lung, post transplant biopsy     WBC & Diff,Body Fluid [[id number]] Collected:  08/22/18 0812    Order Status:  Sent Lab Status:  In process Updated:  08/22/18 0812    Specimen:  Body Fluid     X-Ray Chest AP Single View [[id number]]     Order Status:  Sent Lab Status:  No result         Final Active Diagnoses:    Diagnosis Date Noted POA    PRINCIPAL PROBLEM:  Lung replaced by transplant [Z94.2] 08/24/2017 Not Applicable      Problems Resolved During this  Admission:      Discharged Condition: good    Disposition: Home or Self Care    Medications:  Reconciled Home Medications:      Medication List      CONTINUE taking these medications    amLODIPine 5 MG tablet  Commonly known as:  NORVASC  Take 1 tablet (5 mg total) by mouth once daily.     aspirin 81 MG EC tablet  Commonly known as:  ECOTRIN  Take 1 tablet (81 mg total) by mouth once daily.     blood sugar diagnostic Strp  To check BG 4 times daily, to use with insurance preferred meter     calcium-vitamin D3 600 mg(1,500mg) -200 unit Tab  Commonly known as:  CALCIUM 600 WITH VITAMIN D3  Take 1 tablet by mouth 2 (two) times daily.     CINNAMON 500 mg capsule  Generic drug:  cinnamon bark  Take 500 mg by mouth once daily.     famotidine 20 MG tablet  Commonly known as:  PEPCID  Take 1 tablet (20 mg total) by mouth 2 (two) times daily.     fluticasone 50 mcg/actuation nasal spray  Commonly known as:  FLONASE  1 spray by Each Nare route once daily.     folic acid 1 MG tablet  Commonly known as:  FOLVITE  Take 1 tablet (1 mg total) by mouth once daily.     furosemide 40 MG tablet  Commonly known as:  LASIX  Take 1 tablet (40 mg total) by mouth daily as needed.     HumaLOG KwikPen Insulin 100 unit/mL Inpn pen  Generic drug:  insulin lispro  INJECT 12 UNITS UNDER THE SKIN DAILY WITH MEALS PLUS CORRECTION SCALE. MAXIMUM DAILY DOSE IS 60 UNITS DAILY.     insulin degludec 200 unit/mL (3 mL) Inpn  Commonly known as:  TRESIBA FLEXTOUCH U-200  Inject 18 Units into the skin every evening.     lancets Misc  To check BG 4 times daily, to use with insurance preferred meter     magnesium oxide 400 mg tablet  Commonly known as:  MAG-OX  Take 1 tablet (400 mg total) by mouth 3 (three) times daily.     metFORMIN 500 MG 24 hr tablet  Commonly known as:  GLUCOPHAGE-XR  Take 2 tablets (1,000 mg total) by mouth 2 (two) times daily with meals.     multivitamin per tablet  Commonly known as:  THERAGRAN  Take 1 tablet by mouth once daily.    "  mycophenolate 250 mg Cap  Commonly known as:  CELLCEPT  Take 4 capsules (1,000 mg total) by mouth 2 (two) times daily.     pen needle, diabetic 32 gauge x 5/32" Ndle  Commonly known as:  BD ULTRA-FINE JIM PEN NEEDLE  Uses 4 times daily, on multiple daily insulin injections     pravastatin 20 MG tablet  Commonly known as:  PRAVACHOL  Take 1 tablet (20 mg total) by mouth once daily.     predniSONE 10 MG tablet  Commonly known as:  DELTASONE  Take 5 mg by mouth once daily.     sulfamethoxazole-trimethoprim 800-160mg 800-160 mg Tab  Commonly known as:  BACTRIM DS  Take 1 tablet by mouth every Mon, Wed, Fri.     tacrolimus 0.5 MG Cap  Commonly known as:  PROGRAF  Take 5 capsules (2.5 mg total) by mouth every 12 (twelve) hours.     valGANciclovir 450 mg Tab  Commonly known as:  VALCYTE  Take 2 tablets (900 mg total) by mouth 2 (two) times daily.        ASK your doctor about these medications    ACCU-CHEK DEANNE PLUS METER Misc  Generic drug:  blood-glucose meter  USE AS DIRECTED          Patient Instructions:      Diet general     Call MD for:  temperature >101     Call MD for:  coughing up blood greater than 3 tablespoons in volume     Call MD for:  chest pain     Call MD for:  difficulty breathing or shortness of breath     Call MD for:  development of yellow/green sputum     Activity as tolerated     Follow Up:      Darrick Wolfe MD  Lung Transplant  [facility]  "

## 2018-08-22 NOTE — TELEPHONE ENCOUNTER
Contacted patient and confirmed that patient is taking the Prograf correctly as prescribed at 2.5 mg bid and that the timing of the labs were done correctly.  Received vvorb from Dr. Wolfe instructed patient to hold Prograf x 2 doses and to repeat labs on 8/23/18 at the main campus to get results back same day.  Patient verbalized understanding and agreed to have labs done here in the morning.  Patient did not have any questions at this time.

## 2018-08-22 NOTE — SEDATION DOCUMENTATION
Specimens obtained during Bronchoscopy:  BAL RML 90 ml nacl in 25 ml return also send for Galactomannan, TBBX RLL, [place] micro L main.  Verbal report given to DOSC to include documentation charted in procedural sedation documentation.  Patient to be NPO 1 hour post procedure and place in PO tolerance at 0900, CXR needed at bedside.  Moderate concious sedation was performed and cardiorespiratory functions were monitored the entire procedure by Ranjana Kimbrough RN.  Sedation began at 0749  and concluded at 0822.  Ranjana Kimbrough RN

## 2018-08-22 NOTE — TELEPHONE ENCOUNTER
Answered page for another coordinator, Danielle Aaron RN - call from Cristiane in the lab to report a Prograf level of 50.8 - drawn at a satellite lab on 8/21/18 at 08:05. Result reported to ALESSANDRA Aaron RN who will discuss with Dr. Wolfe.

## 2018-08-23 ENCOUNTER — LAB VISIT (OUTPATIENT)
Dept: LAB | Facility: HOSPITAL | Age: 58
End: 2018-08-23
Payer: COMMERCIAL

## 2018-08-23 ENCOUNTER — TELEPHONE (OUTPATIENT)
Dept: TRANSPLANT | Facility: CLINIC | Age: 58
End: 2018-08-23

## 2018-08-23 DIAGNOSIS — Z94.2 LUNG REPLACED BY TRANSPLANT: ICD-10-CM

## 2018-08-23 LAB
ANION GAP SERPL CALC-SCNC: 7 MMOL/L
BUN SERPL-MCNC: 18 MG/DL
CALCIUM SERPL-MCNC: 8.9 MG/DL
CHLORIDE SERPL-SCNC: 103 MMOL/L
CO2 SERPL-SCNC: 28 MMOL/L
CREAT SERPL-MCNC: 1.2 MG/DL
EST. GFR  (AFRICAN AMERICAN): >60 ML/MIN/1.73 M^2
EST. GFR  (NON AFRICAN AMERICAN): >60 ML/MIN/1.73 M^2
GALACTOMANNAN AG SPEC-ACNC: <0.5 INDEX
GLUCOSE SERPL-MCNC: 154 MG/DL
POTASSIUM SERPL-SCNC: 4.4 MMOL/L
SODIUM SERPL-SCNC: 138 MMOL/L
TACROLIMUS BLD-MCNC: 27 NG/ML

## 2018-08-23 PROCEDURE — 80048 BASIC METABOLIC PNL TOTAL CA: CPT

## 2018-08-23 PROCEDURE — 36415 COLL VENOUS BLD VENIPUNCTURE: CPT

## 2018-08-23 PROCEDURE — 80197 ASSAY OF TACROLIMUS: CPT

## 2018-08-23 NOTE — TELEPHONE ENCOUNTER
Received written orders from Dr. Wolfe to instruct the patient to continue to hold Prograf and have daily Prograf level for now - plan to resume once Prograf level reaches 15. Verify that the patient has stopped taking OTC cinnamon supplement.  Contacted the patient with instructions re: no Prograf until further notice, daily Prograf level and BMP - scheduled on 8/24/18 & 8/25/18 - here. Patient confirmed that he has stopped taking the cinnamon and understands instructions as discussed.

## 2018-08-23 NOTE — TELEPHONE ENCOUNTER
----- Message from Sera Strong RN sent at 8/23/2018  5:00 PM CDT -----  How often do you want to repeat the Prograf level?  ----- Message -----  From: Darrick Wolfe MD  Sent: 8/23/2018   4:54 PM  To: Danielle BRICEÑO Do, RN    WOW!

## 2018-08-24 ENCOUNTER — LAB VISIT (OUTPATIENT)
Dept: LAB | Facility: HOSPITAL | Age: 58
End: 2018-08-24
Attending: INTERNAL MEDICINE
Payer: COMMERCIAL

## 2018-08-24 ENCOUNTER — TELEPHONE (OUTPATIENT)
Dept: TRANSPLANT | Facility: CLINIC | Age: 58
End: 2018-08-24

## 2018-08-24 DIAGNOSIS — Z94.2 LUNG REPLACED BY TRANSPLANT: ICD-10-CM

## 2018-08-24 LAB
ANION GAP SERPL CALC-SCNC: 8 MMOL/L
BACTERIA SPEC AEROBE CULT: NORMAL
BACTERIA SPEC AEROBE CULT: NORMAL
BUN SERPL-MCNC: 16 MG/DL
CALCIUM SERPL-MCNC: 9.5 MG/DL
CHLORIDE SERPL-SCNC: 103 MMOL/L
CMV DNA # SERPL NAA+PROBE: ABNORMAL CPY/ML
CMV DNA SERPL NAA+PROBE-ACNC: 638 IU/ML
CMV DNA SERPL NAA+PROBE-LOG IU: 2.8 LOG IU/ML
CMV DNA SERPL NAA+PROBE-LOG#: 3 LOG CPY/ML
CMV GENE MUT DET ISLT: DETECTED
CO2 SERPL-SCNC: 28 MMOL/L
CREAT SERPL-MCNC: 1 MG/DL
ENTEROVIRUS: NOT DETECTED
EST. GFR  (AFRICAN AMERICAN): >60 ML/MIN/1.73 M^2
EST. GFR  (NON AFRICAN AMERICAN): >60 ML/MIN/1.73 M^2
GLUCOSE SERPL-MCNC: 198 MG/DL
GRAM STN SPEC: NORMAL
HUMAN BOCAVIRUS: NOT DETECTED
HUMAN CORONAVIRUS, COMMON COLD VIRUS: NOT DETECTED
INFLUENZA A - H1N1-09: NOT DETECTED
LEGIONELLA PNEUMOPHILA: NOT DETECTED
MORAXELLA CATARRHALIS: NOT DETECTED
PARAINFLUENZA: NOT DETECTED
POTASSIUM SERPL-SCNC: 4.2 MMOL/L
RVP - ADENOVIRUS: NOT DETECTED
RVP - HUMAN METAPNEUMOVIRUS (HMPV): NOT DETECTED
RVP - INFLUENZA A: NOT DETECTED
RVP - INFLUENZA B: NOT DETECTED
RVP - RESPIRATORY SYNCTIAL VIRUS (RSV) A: NOT DETECTED
RVP - RESPIRATORY VIRAL PANEL, SOURCE: NORMAL
RVP - RHINOVIRUS: NOT DETECTED
SODIUM SERPL-SCNC: 139 MMOL/L
SPECIMEN SOURCE: ABNORMAL
TACROLIMUS BLD-MCNC: 19 NG/ML
TEM - ACINETOBACTER BAUMANNII: NOT DETECTED
TEM - BORDETELLA PERTUSSIS: NOT DETECTED
TEM - CHLAMYDOPHILA PNEUMONIAE: NOT DETECTED
TEM - KLEBSIELLA PNEUMONIAE: NOT DETECTED
TEM - MRSA: NOT DETECTED
TEM - MYCOPLASMA PNEUMONIAE: NOT DETECTED
TEM - NEISSERIA MENINGITIDIS: NOT DETECTED
TEM - PANTON-VALENTINE: NOT DETECTED
TEM - PSEUDOMONAS AERUGINOSA: NOT DETECTED
TEM - STAPHYLOCOCCUS AUREUS: NOT DETECTED
TEM - STREPTOCOCCUS PNEUMONIAE: NOT DETECTED
TEM - STREPTOCOCCUS PYOGENES A: NOT DETECTED
TEM- HAEMOPHILUS INFLUENZAE B: NOT DETECTED
TEM- HAEMOPHILUS INFLUENZAE: NOT DETECTED

## 2018-08-24 PROCEDURE — 80048 BASIC METABOLIC PNL TOTAL CA: CPT

## 2018-08-24 PROCEDURE — 36415 COLL VENOUS BLD VENIPUNCTURE: CPT

## 2018-08-24 PROCEDURE — 80197 ASSAY OF TACROLIMUS: CPT

## 2018-08-24 NOTE — TELEPHONE ENCOUNTER
Contacted patient and instructed patient to continue to hold Prograf for now and repeat labs in the morning (8/25/18) at the main campus as ordered by Dr. Wolfe.  Patient states that he stopped taking the cinnamon and feels much better today as he was starting to feel bad a couple days ago.  Patient instructed to listen for the phone for possible calls or messages from Dr. Wolfe for further instructions after labs are done.  Patient verbalized understanding of the above instructions and will have labs repeated tomorrow morning.

## 2018-08-25 ENCOUNTER — LAB VISIT (OUTPATIENT)
Dept: LAB | Facility: HOSPITAL | Age: 58
End: 2018-08-25
Attending: INTERNAL MEDICINE
Payer: COMMERCIAL

## 2018-08-25 DIAGNOSIS — Z94.2 LUNG REPLACED BY TRANSPLANT: ICD-10-CM

## 2018-08-25 LAB
ANION GAP SERPL CALC-SCNC: 9 MMOL/L
BUN SERPL-MCNC: 13 MG/DL
CALCIUM SERPL-MCNC: 9.2 MG/DL
CHLORIDE SERPL-SCNC: 102 MMOL/L
CO2 SERPL-SCNC: 26 MMOL/L
CREAT SERPL-MCNC: 1.1 MG/DL
EST. GFR  (AFRICAN AMERICAN): >60 ML/MIN/1.73 M^2
EST. GFR  (NON AFRICAN AMERICAN): >60 ML/MIN/1.73 M^2
GLUCOSE SERPL-MCNC: 171 MG/DL
POTASSIUM SERPL-SCNC: 4.3 MMOL/L
SODIUM SERPL-SCNC: 137 MMOL/L
TACROLIMUS BLD-MCNC: 12.4 NG/ML

## 2018-08-25 PROCEDURE — 36415 COLL VENOUS BLD VENIPUNCTURE: CPT

## 2018-08-25 PROCEDURE — 80048 BASIC METABOLIC PNL TOTAL CA: CPT

## 2018-08-25 PROCEDURE — 80197 ASSAY OF TACROLIMUS: CPT

## 2018-08-27 DIAGNOSIS — Z94.2 LUNG REPLACED BY TRANSPLANT: ICD-10-CM

## 2018-08-27 RX ORDER — TACROLIMUS 0.5 MG/1
2 CAPSULE ORAL EVERY 12 HOURS
Qty: 240 CAPSULE | Refills: 11 | Status: SHIPPED | OUTPATIENT
Start: 2018-08-27 | End: 2018-08-31

## 2018-08-27 NOTE — TELEPHONE ENCOUNTER
Received written orders from Dr. Wolfe to have patient restart Prograf at 2 mg bid and to stop taking the cinnamon.  Patient contacted and instructed to start Prograf at the above dose starting today and to stop taking the cinnamon.  Patient will have repeat labs on Thursday in [place].  Pt was reminded not to take Prograf the morning of labs until after labs have been drawn.  Patient repeated the above dose and verbalized understanding of all information provided.

## 2018-08-27 NOTE — TELEPHONE ENCOUNTER
----- Message from Darrick Wolfe MD sent at 8/27/2018 10:22 AM CDT -----  I would start it a 2mg bid and make sure he is not taking the cinnamon.  ----- Message -----  From: Danielle BRICEÑO Do, RN  Sent: 8/27/2018  10:02 AM  To: Darrick Wolfe MD    Was on Prograf 3 mg bid since June but was decreased to 2.5 mg bid when his level was at 18.

## 2018-08-28 ENCOUNTER — TELEPHONE (OUTPATIENT)
Dept: ENDOCRINOLOGY | Facility: CLINIC | Age: 58
End: 2018-08-28

## 2018-08-30 ENCOUNTER — LAB VISIT (OUTPATIENT)
Dept: LAB | Facility: HOSPITAL | Age: 58
End: 2018-08-30
Attending: OPTOMETRIST
Payer: COMMERCIAL

## 2018-08-30 DIAGNOSIS — Z94.2 LUNG REPLACED BY TRANSPLANT: ICD-10-CM

## 2018-08-30 LAB
ANION GAP SERPL CALC-SCNC: 8 MMOL/L
BASOPHILS # BLD AUTO: ABNORMAL K/UL
BASOPHILS NFR BLD: 0 %
BUN SERPL-MCNC: 16 MG/DL
CALCIUM SERPL-MCNC: 9.1 MG/DL
CHLORIDE SERPL-SCNC: 101 MMOL/L
CO2 SERPL-SCNC: 30 MMOL/L
CREAT SERPL-MCNC: 1 MG/DL
DIFFERENTIAL METHOD: ABNORMAL
EOSINOPHIL # BLD AUTO: ABNORMAL K/UL
EOSINOPHIL NFR BLD: 4 %
ERYTHROCYTE [DISTWIDTH] IN BLOOD BY AUTOMATED COUNT: 13.3 %
EST. GFR  (AFRICAN AMERICAN): >60 ML/MIN/1.73 M^2
EST. GFR  (NON AFRICAN AMERICAN): >60 ML/MIN/1.73 M^2
GLUCOSE SERPL-MCNC: 201 MG/DL
HCT VFR BLD AUTO: 35.8 %
HGB BLD-MCNC: 11.3 G/DL
IMM GRANULOCYTES # BLD AUTO: ABNORMAL K/UL
IMM GRANULOCYTES NFR BLD AUTO: ABNORMAL %
LYMPHOCYTES # BLD AUTO: ABNORMAL K/UL
LYMPHOCYTES NFR BLD: 8 %
MCH RBC QN AUTO: 28.6 PG
MCHC RBC AUTO-ENTMCNC: 31.6 G/DL
MCV RBC AUTO: 91 FL
MONOCYTES # BLD AUTO: ABNORMAL K/UL
MONOCYTES NFR BLD: 3 %
NEUTROPHILS NFR BLD: 84 %
NEUTS BAND NFR BLD MANUAL: 1 %
NRBC BLD-RTO: 0 /100 WBC
PLATELET # BLD AUTO: 323 K/UL
PMV BLD AUTO: 8.9 FL
POTASSIUM SERPL-SCNC: 4.1 MMOL/L
RBC # BLD AUTO: 3.95 M/UL
SODIUM SERPL-SCNC: 139 MMOL/L
WBC # BLD AUTO: 7.33 K/UL

## 2018-08-30 PROCEDURE — 85027 COMPLETE CBC AUTOMATED: CPT

## 2018-08-30 PROCEDURE — 85007 BL SMEAR W/DIFF WBC COUNT: CPT

## 2018-08-30 PROCEDURE — 80048 BASIC METABOLIC PNL TOTAL CA: CPT

## 2018-08-30 PROCEDURE — 80197 ASSAY OF TACROLIMUS: CPT

## 2018-08-30 PROCEDURE — 36415 COLL VENOUS BLD VENIPUNCTURE: CPT | Mod: PO

## 2018-08-31 DIAGNOSIS — Z94.2 LUNG REPLACED BY TRANSPLANT: ICD-10-CM

## 2018-08-31 LAB
CMV DNA SERPL NAA+PROBE-ACNC: ABNORMAL IU/ML
TACROLIMUS BLD-MCNC: 11.7 NG/ML

## 2018-08-31 RX ORDER — TACROLIMUS 0.5 MG/1
CAPSULE ORAL
Qty: 270 CAPSULE | Refills: 11 | Status: SHIPPED | OUTPATIENT
Start: 2018-08-31 | End: 2018-12-13

## 2018-08-31 NOTE — TELEPHONE ENCOUNTER
Received written orders from Dr. Wolfe instructing patient to increase Prograf to 2.5 mg in the morning and to keep the evening dose the same at 2 mg.  Patient was contacted and notified of the above dose adjustment.  Patient was notified that the CMV results are still pending but is to remain on Valcyte for at least another 2 weeks until the next lab is due.  Patient will have repeat labs checked on Tuesday 9/4/18 in [place].  Patient verbalized his understanding and did not have any further questions at this time.

## 2018-08-31 NOTE — TELEPHONE ENCOUNTER
----- Message from Darrick Wolfe MD sent at 8/31/2018  1:29 PM CDT -----  Increase to 2.5 in the AM. He need a level closer to 15

## 2018-09-02 DIAGNOSIS — Z94.2 LUNG REPLACED BY TRANSPLANT: ICD-10-CM

## 2018-09-03 RX ORDER — SULFAMETHOXAZOLE AND TRIMETHOPRIM 800; 160 MG/1; MG/1
TABLET ORAL
Qty: 15 TABLET | Refills: 0 | Status: SHIPPED | OUTPATIENT
Start: 2018-09-03 | End: 2018-10-01 | Stop reason: SDUPTHER

## 2018-09-04 ENCOUNTER — LAB VISIT (OUTPATIENT)
Dept: LAB | Facility: HOSPITAL | Age: 58
End: 2018-09-04
Attending: FAMILY MEDICINE
Payer: COMMERCIAL

## 2018-09-04 ENCOUNTER — TELEPHONE (OUTPATIENT)
Dept: TRANSPLANT | Facility: CLINIC | Age: 58
End: 2018-09-04

## 2018-09-04 DIAGNOSIS — Z94.2 LUNG REPLACED BY TRANSPLANT: ICD-10-CM

## 2018-09-04 LAB
ANION GAP SERPL CALC-SCNC: 7 MMOL/L
ANISOCYTOSIS BLD QL SMEAR: SLIGHT
BASOPHILS # BLD AUTO: ABNORMAL K/UL
BASOPHILS NFR BLD: 1 %
BUN SERPL-MCNC: 23 MG/DL
CALCIUM SERPL-MCNC: 9.4 MG/DL
CHLORIDE SERPL-SCNC: 103 MMOL/L
CO2 SERPL-SCNC: 29 MMOL/L
CREAT SERPL-MCNC: 1.2 MG/DL
DIFFERENTIAL METHOD: ABNORMAL
EOSINOPHIL # BLD AUTO: ABNORMAL K/UL
EOSINOPHIL NFR BLD: 1 %
ERYTHROCYTE [DISTWIDTH] IN BLOOD BY AUTOMATED COUNT: 13.8 %
EST. GFR  (AFRICAN AMERICAN): >60 ML/MIN/1.73 M^2
EST. GFR  (NON AFRICAN AMERICAN): >60 ML/MIN/1.73 M^2
GLUCOSE SERPL-MCNC: 236 MG/DL
HCT VFR BLD AUTO: 36 %
HGB BLD-MCNC: 11.3 G/DL
IMM GRANULOCYTES # BLD AUTO: ABNORMAL K/UL
IMM GRANULOCYTES NFR BLD AUTO: ABNORMAL %
LYMPHOCYTES # BLD AUTO: ABNORMAL K/UL
LYMPHOCYTES NFR BLD: 1 %
MCH RBC QN AUTO: 28.8 PG
MCHC RBC AUTO-ENTMCNC: 31.4 G/DL
MCV RBC AUTO: 92 FL
MONOCYTES # BLD AUTO: ABNORMAL K/UL
MONOCYTES NFR BLD: 1 %
NEUTROPHILS NFR BLD: 95 %
NEUTS BAND NFR BLD MANUAL: 1 %
NRBC BLD-RTO: 0 /100 WBC
OVALOCYTES BLD QL SMEAR: ABNORMAL
PLATELET # BLD AUTO: 329 K/UL
PMV BLD AUTO: 8.9 FL
POIKILOCYTOSIS BLD QL SMEAR: SLIGHT
POLYCHROMASIA BLD QL SMEAR: ABNORMAL
POTASSIUM SERPL-SCNC: 4.7 MMOL/L
RBC # BLD AUTO: 3.92 M/UL
SODIUM SERPL-SCNC: 139 MMOL/L
WBC # BLD AUTO: 8.19 K/UL

## 2018-09-04 PROCEDURE — 36415 COLL VENOUS BLD VENIPUNCTURE: CPT | Mod: PO

## 2018-09-04 PROCEDURE — 85027 COMPLETE CBC AUTOMATED: CPT

## 2018-09-04 PROCEDURE — 80048 BASIC METABOLIC PNL TOTAL CA: CPT

## 2018-09-04 PROCEDURE — 80197 ASSAY OF TACROLIMUS: CPT

## 2018-09-04 PROCEDURE — 85007 BL SMEAR W/DIFF WBC COUNT: CPT

## 2018-09-04 NOTE — TELEPHONE ENCOUNTER
Received written orders from Dr. Wolef to recheck CMV in 2 weeks.  Patient has a clinic appointment in 2 weeks on 9/17/18.  Will repeat CMV when patient comes for his clinic appointment.

## 2018-09-05 LAB — TACROLIMUS BLD-MCNC: 14 NG/ML

## 2018-09-10 DIAGNOSIS — Z94.2 LUNG REPLACED BY TRANSPLANT: ICD-10-CM

## 2018-09-10 DIAGNOSIS — E83.42 HYPOMAGNESEMIA: ICD-10-CM

## 2018-09-10 RX ORDER — LANOLIN ALCOHOL/MO/W.PET/CERES
CREAM (GRAM) TOPICAL
Qty: 60 TABLET | Refills: 0 | Status: SHIPPED | OUTPATIENT
Start: 2018-09-10 | End: 2018-10-21 | Stop reason: SDUPTHER

## 2018-09-17 ENCOUNTER — HOSPITAL ENCOUNTER (OUTPATIENT)
Dept: RADIOLOGY | Facility: HOSPITAL | Age: 58
Discharge: HOME | End: 2018-09-17
Attending: INTERNAL MEDICINE
Payer: COMMERCIAL

## 2018-09-17 ENCOUNTER — HOSPITAL ENCOUNTER (OUTPATIENT)
Dept: PULMONOLOGY | Facility: HOSPITAL | Age: 58
Discharge: HOME | End: 2018-09-17
Attending: INTERNAL MEDICINE
Payer: COMMERCIAL

## 2018-09-17 ENCOUNTER — OFFICE VISIT (OUTPATIENT)
Dept: TRANSPLANT | Facility: CLINIC | Age: 58
End: 2018-09-17
Payer: COMMERCIAL

## 2018-09-17 VITALS
RESPIRATION RATE: 20 BRPM | HEART RATE: 92 BPM | TEMPERATURE: 97 F | BODY MASS INDEX: 31.78 KG/M2 | SYSTOLIC BLOOD PRESSURE: 144 MMHG | OXYGEN SATURATION: 98 % | DIASTOLIC BLOOD PRESSURE: 86 MMHG | HEIGHT: 70 IN | WEIGHT: 222 LBS

## 2018-09-17 DIAGNOSIS — Z94.2 LUNG REPLACED BY TRANSPLANT: Primary | ICD-10-CM

## 2018-09-17 DIAGNOSIS — B25.9 CYTOMEGALOVIRUS INFECTION, UNSPECIFIED CYTOMEGALOVIRAL INFECTION TYPE: ICD-10-CM

## 2018-09-17 DIAGNOSIS — Z94.2 LUNG REPLACED BY TRANSPLANT: ICD-10-CM

## 2018-09-17 DIAGNOSIS — D84.9 IMMUNOSUPPRESSION: ICD-10-CM

## 2018-09-17 DIAGNOSIS — K21.9 GASTROESOPHAGEAL REFLUX DISEASE WITHOUT ESOPHAGITIS: ICD-10-CM

## 2018-09-17 DIAGNOSIS — Z79.2 PROPHYLACTIC ANTIBIOTIC: ICD-10-CM

## 2018-09-17 LAB
PRE FEV1 FVC: 83
PRE FEV1: 2.52
PRE FVC: 3.02
PREDICTED FEV1 FVC: 80
PREDICTED FEV1: 3.71
PREDICTED FVC: 4.57

## 2018-09-17 PROCEDURE — 71046 X-RAY EXAM CHEST 2 VIEWS: CPT | Mod: TC

## 2018-09-17 PROCEDURE — 99214 OFFICE O/P EST MOD 30 MIN: CPT | Mod: 25,S$GLB,, | Performed by: INTERNAL MEDICINE

## 2018-09-17 PROCEDURE — 94010 BREATHING CAPACITY TEST: CPT | Mod: 26,,, | Performed by: INTERNAL MEDICINE

## 2018-09-17 PROCEDURE — 99999 PR PBB SHADOW E&M-EST. PATIENT-LVL III: CPT | Mod: PBBFAC,,, | Performed by: INTERNAL MEDICINE

## 2018-09-17 PROCEDURE — 71046 X-RAY EXAM CHEST 2 VIEWS: CPT | Mod: 26,,, | Performed by: RADIOLOGY

## 2018-09-17 NOTE — PROGRESS NOTES
LUNG TRANSPLANT RECIPIENT FOLLOW-UP    Reason for Visit: Follow-up after lung transplantation.                                                                                                         Date of Transplant: 8/22/17                                                                               Reason for Transplant: Sarcoidosis with pulmonary hypertension     Type of Transplant: bilateral sequential lung     CMV Status: D+ / R-      Major Complications:   1. Left vocal cord dysfunction   2. 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 is a 57 y.o. year old male with the above transplant history who presents today for follow up. He reports that he is doing well and voices no new complaints. He has a chronic non-productive cough, which is unchanged. Since his last clinic visit, he underwent his routine 12 month bronchoscopy on 8/22/18. No rejection seen on pathology, however CMV PCR returned positive. His serum PCR was [id number]. He was started on Valcyte 900mg PO BID the week prior for a positive serum PCR of 8160 which was discovered at his clinic visit. He has another level pending today. Additionally since the last visit, he had an unexpected rise in his tacrolimus level to 50.8 -- this was attributed to use of a cinnamon supplement. The level improved with temporarily holding the prograf in addition to discontinuing the cinnamon.     Review of Systems   Constitutional: Negative for chills, fever and weight loss.   Respiratory: Positive for cough. Negative for hemoptysis, sputum production, shortness of breath and wheezing.    Cardiovascular: Negative for chest pain.   Gastrointestinal: Negative for nausea and vomiting.   Skin: Negative for rash.   Neurological: Negative for weakness.     BP (!) 144/86   Pulse 92   Temp 97.2 °F  "(36.2 °C) (Oral)   Resp 20   Ht 5' 10" (1.778 m)   Wt 100.7 kg (222 lb)   SpO2 98%   BMI 31.85 kg/m²     Physical Exam   Constitutional: He is oriented to person, place, and time and well-developed, well-nourished, and in no distress. No distress.   HENT:   Head: Normocephalic and atraumatic.   Mouth/Throat: Oropharynx is clear and moist.   Eyes: Conjunctivae are normal.   Neck: No JVD present.   Cardiovascular: Normal rate, regular rhythm and normal heart sounds.   Pulmonary/Chest: Effort normal and breath sounds normal. No respiratory distress. He has no wheezes.   Abdominal: Soft. Bowel sounds are normal.   Musculoskeletal: He exhibits no edema.   Neurological: He is alert and oriented to person, place, and time. GCS score is 15.   Skin: Skin is warm and dry. He is not diaphoretic.   Psychiatric: Mood, memory, affect and judgment normal.   Vitals reviewed.    Labs:  cbc, cmp, tacrolimus Latest Ref Rng & Units 8/30/2018 9/4/2018 9/17/2018   TACROLIMUS LVL 5.0 - 15.0 ng/mL 11.7 14.0 -   WHITE BLOOD CELL COUNT 3.90 - 12.70 K/uL 7.33 8.19 7.56   RBC 4.60 - 6.20 M/uL 3.95(L) 3.92(L) 3.96(L)   HEMOGLOBIN 14.0 - 18.0 g/dL 11.3(L) 11.3(L) 11.7(L)   HEMATOCRIT 40.0 - 54.0 % 35.8(L) 36.0(L) 35.5(L)   MCV 82 - 98 fL 91 92 90   MCH 27.0 - 31.0 pg 28.6 28.8 29.5   MCHC 32.0 - 36.0 g/dL 31.6(L) 31.4(L) 33.0   RDW 11.5 - 14.5 % 13.3 13.8 14.6(H)   PLATELETS 150 - 350 K/uL 323 329 411(H)   MPV 9.2 - 12.9 fL 8.9(L) 8.9(L) 8.9(L)   GRAN # 1.8 - 7.7 K/uL - - -   LYMPH # 1.0 - 4.8 K/uL CANCELED CANCELED -   MONO # 0.3 - 1.0 K/uL CANCELED CANCELED -   EOSINOPHIL% 0.0 - 8.0 % 4.0 1.0 -   BASOPHIL% 0.0 - 1.9 % 0.0 1.0 -   DIFFERENTIAL METHOD - Manual Manual -   SODIUM 136 - 145 mmol/L 139 139 139   POTASSIUM 3.5 - 5.1 mmol/L 4.1 4.7 4.0   CHLORIDE 95 - 110 mmol/L 101 103 103   CO2 23 - 29 mmol/L 30(H) 29 27   GLUCOSE 70 - 110 mg/dL 201(H) 236(H) 243(H)   BUN BLD 6 - 20 mg/dL 16 23(H) 15   CREATININE 0.5 - 1.4 mg/dL 1.0 1.2 1.0 "   CALCIUM 8.7 - 10.5 mg/dL 9.1 9.4 9.4   PROTEIN TOTAL 6.0 - 8.4 g/dL - - 7.0   ALBUMIN 3.5 - 5.2 g/dL - - 3.9   BILIRUBIN TOTAL 0.1 - 1.0 mg/dL - - 0.7   ALK PHOS 55 - 135 U/L - - 82   AST 10 - 40 U/L - - 17   ALT 10 - 44 U/L - - 18   ANION GAP 8 - 16 mmol/L 8 7(L) 9   EGFR IF AFRICAN AMERICAN >60 mL/min/1.73 m:2 >60.0 >60.0 >60.0   EGFR IF NON-AFRICAN AMERICAN >60 mL/min/1.73 m:2 >60.0 >60.0 >60.0     Pulmonary Function Tests 9/17/2018 8/15/2018 7/11/2018 6/6/2018 5/7/2018 4/9/2018 3/19/2018   FVC 3.02 3.15 3.33 3.3 3.19 3.22 3.21   FEV1 2.52 2.53 2.73 2.72 2.56 2.6 2.61   TLC (liters) - - - - - - -   DLCO (ml/mmHg sec) - - - - - - -   FVC% 61.4 64 67 72 69 70 70   FEV1% 66.7 67 72 73 69 70 70   FEF 25-75 2.57 2.41 2.87 2.95 2.45 2.47 2.53   FEF 25-75% 79.7 74.9 89 78 65 65 67   TLC% - - - - - - -   RV - - - - - - -   RV% - - - - - - -   DLCO% - - - - - - -       Imaging:  Results for orders placed during the hospital encounter of 08/15/18   X-Ray Chest PA And Lateral    Narrative EXAMINATION:  XR CHEST PA AND LATERAL    CLINICAL HISTORY:  s/p bilateral lung transplant; Lung transplant status    TECHNIQUE:  PA and lateral views of the chest were performed.    COMPARISON:  Studies from 11 July, 6 June, 7 May and 19 April 2018    FINDINGS:  There is stable radiographic appearance of the cardiomediastinal shadow, the hilar regions and both lungs.  Calcified mediastinal and hilar nodes are again demonstrated.  Scarring and/or fibrosis is again demonstrated in the region of the left costophrenic angle with the lungs appearing fully expanded and clear.  No pleural effusion is demonstrated.  Sternotomy wire sutures are again demonstrated.  There is stable radiographic appearance of the included osseous structures.      Impression Stable radiographic appearance of the chest including postoperative findings with no evidence of acute process.      Electronically signed by: Bebo Garcia  MD  Date:    08/15/2018  Time:    08:40       Assessment:  1. Lung replaced by transplant    2. Prophylactic antibiotic    3. Immunosuppression    4. Cytomegalovirus infection, unspecified cytomegaloviral infection type    5. Gastroesophageal reflux disease without esophagitis      Plan:     1. Spirometry and FEV1 stable from last month, however remains decreased from 2 months previous. May be explained by CMV infection that was diagnosed last month. Will continue to follow monthly for now. Treating the CMV as outlined below.    2. Continue Valcyte 900 mg PO BID for CMV positive serum and BAL fluid. Follow up level from today. Repeat in 2 weeks     3. Continue prednisone 10 mg daily, MMF 1000 mg BID, and tacrolimus. .      4. Continue Bactrim DS for PCP prophylaxis.      5. Continue famotidine 20 mg BID. EGD normal.      6. Follow up in lung transplant clinic in one month or earlier if needed.    Maycol Finley MD  [facility] Fellow  Pager: [phone]  Cell: [phone]    Attending Note:    I have seen and evaluated the patient with the fellow. Their note reflects the content of our discussion and my plan of care.      Darrick Wolfe MD  Pulmonary/Critical Care Medicine

## 2018-09-18 ENCOUNTER — PATIENT MESSAGE (OUTPATIENT)
Dept: TRANSPLANT | Facility: CLINIC | Age: 58
End: 2018-09-18

## 2018-09-19 ENCOUNTER — PATIENT MESSAGE (OUTPATIENT)
Dept: TRANSPLANT | Facility: CLINIC | Age: 58
End: 2018-09-19

## 2018-09-25 LAB
FUNGUS SPEC CULT: NORMAL
FUNGUS SPEC CULT: NORMAL

## 2018-10-01 ENCOUNTER — LAB VISIT (OUTPATIENT)
Dept: LAB | Facility: HOSPITAL | Age: 58
End: 2018-10-01
Attending: INTERNAL MEDICINE
Payer: COMMERCIAL

## 2018-10-01 DIAGNOSIS — Z94.2 LUNG REPLACED BY TRANSPLANT: ICD-10-CM

## 2018-10-01 LAB
ANISOCYTOSIS BLD QL SMEAR: SLIGHT
BASOPHILS NFR BLD: 4 %
DIFFERENTIAL METHOD: ABNORMAL
EOSINOPHIL NFR BLD: 1 %
ERYTHROCYTE [DISTWIDTH] IN BLOOD BY AUTOMATED COUNT: 15 %
HCT VFR BLD AUTO: 38.7 %
HGB BLD-MCNC: 12.2 G/DL
IMM GRANULOCYTES # BLD AUTO: ABNORMAL K/UL
IMM GRANULOCYTES NFR BLD AUTO: ABNORMAL %
LYMPHOCYTES NFR BLD: 7 %
MCH RBC QN AUTO: 29.7 PG
MCHC RBC AUTO-ENTMCNC: 31.5 G/DL
MCV RBC AUTO: 94 FL
METAMYELOCYTES NFR BLD MANUAL: 1 %
MONOCYTES NFR BLD: 2 %
MYELOCYTES NFR BLD MANUAL: 2 %
NEUTROPHILS NFR BLD: 78 %
NEUTS BAND NFR BLD MANUAL: 5 %
NRBC BLD-RTO: 0 /100 WBC
PLATELET # BLD AUTO: 445 K/UL
PLATELET BLD QL SMEAR: ABNORMAL
PMV BLD AUTO: 9 FL
POLYCHROMASIA BLD QL SMEAR: ABNORMAL
RBC # BLD AUTO: 4.11 M/UL
WBC # BLD AUTO: 6.92 K/UL

## 2018-10-01 PROCEDURE — 36415 COLL VENOUS BLD VENIPUNCTURE: CPT | Mod: PO

## 2018-10-01 PROCEDURE — 85007 BL SMEAR W/DIFF WBC COUNT: CPT

## 2018-10-01 PROCEDURE — 85027 COMPLETE CBC AUTOMATED: CPT

## 2018-10-02 LAB — CMV DNA SERPL NAA+PROBE-ACNC: 153 IU/ML

## 2018-10-02 RX ORDER — SULFAMETHOXAZOLE AND TRIMETHOPRIM 800; 160 MG/1; MG/1
TABLET ORAL
Qty: 15 TABLET | Refills: 0 | Status: SHIPPED | OUTPATIENT
Start: 2018-10-02 | End: 2018-11-08 | Stop reason: SDUPTHER

## 2018-10-02 NOTE — PROGRESS NOTES
CC:  Diabetes.     HPI: Martin Hendrix Jr. is a 57 y.o. male presents for visit. Previous patient of JULEE Bertrand NP. Last seen in 11/2017. This is his first visit with me.    The patient was diagnosed with Type 2 diabetes in ~2017 on labs.    Living back home in [place].    DIABETES COMPLICATIONS: nephropathy (micro x1)     Diabetes Management Status  Statin: Taking  ACE/ARB: Not taking  Screening or Prevention Patient's value Goal Complete/Controlled?   HgA1C Testing and Control   Lab Results   Component Value Date    HGBA1C 6.7 (H) 07/11/2018        GOAL A1C: <7% Yes   Lipid profile : 02/19/2018 Annually Yes   LDL control Lab Results   Component Value Date    LDLCALC 129.4 02/19/2018    Annually/Less than 100 mg/dl  No   Nephropathy screening Lab Results   Component Value Date    LABMICR 28.0 11/16/2017     Lab Results   Component Value Date    PROTEINUA Negative 08/21/2017    Annually Yes   Blood pressure BP Readings from Last 1 Encounters:   10/04/18 (!) 142/82    Less than 140/90 No   Dilated retinal exam : 12/13/2017 Annually Yes   Foot exam   : 11/15/2017 Annually Yes     DM MEDICATIONS USED IN THE PAST: Metformin, Glipizide, MDI while in hospital     CURRENT DIABETES MEDICATIONS: Metformin 1000 mg BID, Humalog 12 units AC plus correction scale (150 +1), Tresiba 18 units nightly    -- has not been giving himself insulin at lunch.     BLOOD GLUCOSE MONITORING:    Checking BG 2x daily, has not been checking at lunch  Oral recall:  140-150    HYPOGLYCEMIA:  No  Knows how to correct with 15 grams of carbs- juice, coke, or a peppermint.     MEALS:   Eating 2-3 meals per day   Snacks on fruit, chips  Water + lemon, rare juice    ROS:   Constitutional: Negative for weight change  Endocrine:  + improved polyuria, polydipsia, nocturia.  HENT: Denies neck swelling, lumps, horseness or trouble swallowing. Denies any personal or family history of thyroid cancer.    Eyes: Negative for visual disturbance.    "Respiratory: Negative for cough or shortness or breath.  Gastrointestinal: Negative for nausea, diarrhea, vomiting, bloating.  + pancreatitis, hospitalized (~2009), denies gastroparesis.  Genitourinary: Negative for urgency, frequency, frequent urinary tract infections.  Neurological: Negative for syncope, no numbness/burning of extremities.    PE:  Constitutional: BP (!) 142/82   Pulse 68   Ht 5' 11" (1.803 m)   Wt 101.5 kg (223 lb 10.5 oz)   BMI 31.19 kg/m²    Well developed, well nourished, NAD.  Neck:  No trachial deviation present, No neck masses noticed   Thyroid:  No thyromegaly present.  No thyroid tenderness.    Cardiovascular:    Auscultation:  No murmurs or abnormal sounds   Lower extremities:  No edema or cyanosis.   Respiratory:    Effort:  Normal, no use of accessory muscles.   Auscultation: breath sounds normal, no adventitious sounds.  Skin:    Inspection: no rashes, lesion or ulcers, + acanthosis nigracans.   Palpation: no induration or nodules.  Psychiatric:  Judgement and insight good with normal mood and affect.  FOOT EXAMINATION: Appropriate footwear, Foot exam deferred, done 11/2017.  Lab Results   Component Value Date    TSH 1.092 04/24/2017     ASSESSMENT and PLAN:  1. Type 2 diabetes mellitus with hyperglycemia, with long-term current use of insulin  insulin degludec (TRESIBA FLEXTOUCH U-200) 200 unit/mL (3 mL) InPn    Comprehensive metabolic panel    Hemoglobin A1c    Microalbumin/creatinine urine ratio   2. Encounter following organ transplant     3. Mixed hyperlipidemia     4. BMI 31.0-31.9,adult       Type 2 diabetes with micro x1 and hyperglycemia -   -- labs & urine today, insufficient data at this time to be able to change doses.  Continue Metformin 1000 mg BID, Tresiba 18 units nightly, increase Humalog with breakfast to 12 units TID AC plus correction scale 150 +1.   -- Encouraged compliance with checking BG & giving insulin at lunch.   -- Reviewed goals of therapy are to get " the best control we can without hypoglycemia  -- Reviewed patient's current insulin regimen. Clarified proper insulin dose and timing in relation to meals, etc. Insulin injection sites and proper rotation instructed.    -- Advised frequent self blood glucose monitoring.  Patient encouraged to document glucose results and bring them to every clinic visit    -- Hypoglycemia precautions discussed. Instructed on precautions before driving.    -- Call for Bg repeatedly < 90 or > 180.   -- Close adherence to lifestyle changes recommended.   -- Periodic follow ups for eye evaluations, foot care and dental care suggested.    -- Cannot use DPP-4 or GLP-1 medications due to pancreatitis, caution with SGLT-2 at this time, due to h/o AVILA and high BG    -- Instructed to monitor BG ac/hs, document on BG logs, and bring complete BG logs to all visits.     Microalbuminuria x1 - Urine today  -- optimize BG control    2- s/p lung transplant on immunosuppression and steroids - steroid taper as follows:  Prednisone 5 mg daily- to stay on this dose.   Has an appointment with Dr. Wolfe on 10/17.     3- Hyperlipidemia -    Continue Pravastatin 20 mg daily. Use of pravastatin due to potential interaction with valcyte wih lipitor.  Can not check LP today due to patient ate breakfast before appt.    BMI 31  -- encouraged dietary and lifestyle modifications   -- emphasized weight loss goals     Follow-up in about 4 months (around 2/4/2019).   Labs today

## 2018-10-03 ENCOUNTER — PATIENT MESSAGE (OUTPATIENT)
Dept: TRANSPLANT | Facility: CLINIC | Age: 58
End: 2018-10-03

## 2018-10-04 ENCOUNTER — OFFICE VISIT (OUTPATIENT)
Dept: ENDOCRINOLOGY | Facility: CLINIC | Age: 58
End: 2018-10-04
Payer: COMMERCIAL

## 2018-10-04 ENCOUNTER — PATIENT MESSAGE (OUTPATIENT)
Dept: ENDOCRINOLOGY | Facility: CLINIC | Age: 58
End: 2018-10-04

## 2018-10-04 ENCOUNTER — LAB VISIT (OUTPATIENT)
Dept: LAB | Facility: HOSPITAL | Age: 58
End: 2018-10-04
Payer: COMMERCIAL

## 2018-10-04 VITALS
HEIGHT: 71 IN | DIASTOLIC BLOOD PRESSURE: 82 MMHG | WEIGHT: 223.69 LBS | SYSTOLIC BLOOD PRESSURE: 142 MMHG | BODY MASS INDEX: 31.31 KG/M2 | HEART RATE: 68 BPM

## 2018-10-04 DIAGNOSIS — E11.65 TYPE 2 DIABETES MELLITUS WITH HYPERGLYCEMIA, WITH LONG-TERM CURRENT USE OF INSULIN: Primary | Chronic | ICD-10-CM

## 2018-10-04 DIAGNOSIS — E11.65 TYPE 2 DIABETES MELLITUS WITH HYPERGLYCEMIA, WITH LONG-TERM CURRENT USE OF INSULIN: Chronic | ICD-10-CM

## 2018-10-04 DIAGNOSIS — Z79.4 TYPE 2 DIABETES MELLITUS WITH HYPERGLYCEMIA, WITH LONG-TERM CURRENT USE OF INSULIN: Chronic | ICD-10-CM

## 2018-10-04 DIAGNOSIS — Z79.4 TYPE 2 DIABETES MELLITUS WITH HYPERGLYCEMIA, WITH LONG-TERM CURRENT USE OF INSULIN: Primary | Chronic | ICD-10-CM

## 2018-10-04 DIAGNOSIS — Z48.298 ENCOUNTER FOLLOWING ORGAN TRANSPLANT: ICD-10-CM

## 2018-10-04 DIAGNOSIS — E78.2 MIXED HYPERLIPIDEMIA: ICD-10-CM

## 2018-10-04 LAB
ALBUMIN/CREAT UR: 142.8 UG/MG
CREAT UR-MCNC: 138 MG/DL
MICROALBUMIN UR DL<=1MG/L-MCNC: 197 UG/ML

## 2018-10-04 PROCEDURE — 82043 UR ALBUMIN QUANTITATIVE: CPT

## 2018-10-04 PROCEDURE — 99214 OFFICE O/P EST MOD 30 MIN: CPT | Mod: S$GLB,,, | Performed by: NURSE PRACTITIONER

## 2018-10-04 PROCEDURE — 99999 PR PBB SHADOW E&M-EST. PATIENT-LVL III: CPT | Mod: PBBFAC,,, | Performed by: NURSE PRACTITIONER

## 2018-10-04 RX ORDER — INSULIN LISPRO 100 [IU]/ML
14 INJECTION, SOLUTION INTRAVENOUS; SUBCUTANEOUS
Qty: 15 ML | Refills: 11 | Status: ON HOLD | OUTPATIENT
Start: 2018-10-04 | End: 2019-03-13 | Stop reason: SDUPTHER

## 2018-10-04 RX ORDER — INSULIN DEGLUDEC 200 U/ML
20 INJECTION, SOLUTION SUBCUTANEOUS NIGHTLY
Qty: 15 ML | Refills: 11 | Status: ON HOLD | OUTPATIENT
Start: 2018-10-04 | End: 2019-03-13 | Stop reason: SDUPTHER

## 2018-10-04 RX ORDER — INSULIN DEGLUDEC 200 U/ML
18 INJECTION, SOLUTION SUBCUTANEOUS NIGHTLY
Qty: 15 ML | Refills: 11 | Status: SHIPPED | OUTPATIENT
Start: 2018-10-04 | End: 2018-10-04

## 2018-10-17 ENCOUNTER — OFFICE VISIT (OUTPATIENT)
Dept: TRANSPLANT | Facility: CLINIC | Age: 58
End: 2018-10-17
Payer: COMMERCIAL

## 2018-10-17 ENCOUNTER — HOSPITAL ENCOUNTER (OUTPATIENT)
Dept: RADIOLOGY | Facility: HOSPITAL | Age: 58
Discharge: HOME | End: 2018-10-17
Attending: INTERNAL MEDICINE
Payer: COMMERCIAL

## 2018-10-17 ENCOUNTER — HOSPITAL ENCOUNTER (OUTPATIENT)
Dept: PULMONOLOGY | Facility: HOSPITAL | Age: 58
Discharge: HOME | End: 2018-10-17
Attending: INTERNAL MEDICINE
Payer: COMMERCIAL

## 2018-10-17 VITALS
OXYGEN SATURATION: 98 % | HEIGHT: 70 IN | WEIGHT: 224 LBS | HEART RATE: 92 BPM | TEMPERATURE: 98 F | DIASTOLIC BLOOD PRESSURE: 82 MMHG | RESPIRATION RATE: 20 BRPM | SYSTOLIC BLOOD PRESSURE: 136 MMHG | BODY MASS INDEX: 32.07 KG/M2

## 2018-10-17 DIAGNOSIS — Z94.2 LUNG REPLACED BY TRANSPLANT: ICD-10-CM

## 2018-10-17 DIAGNOSIS — D84.9 IMMUNOSUPPRESSION: ICD-10-CM

## 2018-10-17 DIAGNOSIS — Z94.2 LUNG REPLACED BY TRANSPLANT: Primary | ICD-10-CM

## 2018-10-17 DIAGNOSIS — K21.9 GASTROESOPHAGEAL REFLUX DISEASE WITHOUT ESOPHAGITIS: ICD-10-CM

## 2018-10-17 DIAGNOSIS — B25.9 CYTOMEGALOVIRUS INFECTION, UNSPECIFIED CYTOMEGALOVIRAL INFECTION TYPE: ICD-10-CM

## 2018-10-17 DIAGNOSIS — Z79.2 PROPHYLACTIC ANTIBIOTIC: ICD-10-CM

## 2018-10-17 LAB
PRE FEV1 FVC: 82
PRE FEV1: 2.52
PRE FVC: 3.06
PREDICTED FEV1 FVC: 80
PREDICTED FEV1: 3.71
PREDICTED FVC: 4.57

## 2018-10-17 PROCEDURE — 99999 PR PBB SHADOW E&M-EST. PATIENT-LVL IV: CPT | Mod: PBBFAC,,, | Performed by: NURSE PRACTITIONER

## 2018-10-17 PROCEDURE — 71046 X-RAY EXAM CHEST 2 VIEWS: CPT | Mod: 26,,, | Performed by: RADIOLOGY

## 2018-10-17 PROCEDURE — 94010 BREATHING CAPACITY TEST: CPT | Mod: 26,,, | Performed by: INTERNAL MEDICINE

## 2018-10-17 PROCEDURE — 71046 X-RAY EXAM CHEST 2 VIEWS: CPT | Mod: TC

## 2018-10-17 PROCEDURE — 99214 OFFICE O/P EST MOD 30 MIN: CPT | Mod: 25,S$GLB,, | Performed by: NURSE PRACTITIONER

## 2018-10-17 NOTE — PROGRESS NOTES
LUNG TRANSPLANT RECIPIENT FOLLOW-UP    Reason for Visit: Follow-up after lung transplantation.                                                                                                         Date of Transplant: 8/22/17                                                                               Reason for Transplant: Sarcoidosis with pulmonary hypertension     Type of Transplant: bilateral sequential lung     CMV Status: D+ / R-      Major Complications:   1. Left vocal cord dysfunction   2. 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 is a 57 y.o. year old male with the above transplant history who presents today for follow up. He reports that he is doing well and voices no new complaints. He has a chronic non-productive cough, which is unchanged. His routine 12 month bronchoscopy was done on 8/22/18 and there was no rejection seen on pathology.  His CMV PCR was noted to be positive and his serum PCR at the time was [id number]. He was started on Valcyte 900mg PO BID the week prior for a positive serum PCR of 8160 which was discovered at his clinic visit. Since then, he has been monitored closely and his CMV PCR from 10/1/18 was 153.  This continues a downward trend since being on Valcyte.  There is another level pending today. Otherwise, he is doing well and reports no new symptoms.  He denies change in his chronic dry cough, fever, chills, antibiotic use, ED/Urgent Care visits, NVD, or any other symptoms.  He is active and reports feeling well.    Review of Systems   Constitutional: Negative for chills, fever and weight loss.   HENT: Negative for congestion and sore throat.    Eyes: Negative for pain.   Respiratory: Positive for cough (chronic, dry). Negative for hemoptysis, sputum production, shortness of breath and wheezing.   "  Cardiovascular: Negative for chest pain, palpitations, orthopnea, claudication, leg swelling and PND.   Gastrointestinal: Negative for nausea and vomiting.   Genitourinary: Negative for dysuria and flank pain.   Musculoskeletal: Negative.  Negative for myalgias.   Skin: Negative for rash.   Neurological: Negative for dizziness, focal weakness, weakness and headaches.   Psychiatric/Behavioral: Negative for depression. The patient is not nervous/anxious.      /82   Pulse 92   Temp 97.7 °F (36.5 °C) (Oral)   Resp 20   Ht 5' 10" (1.778 m)   Wt 101.6 kg (224 lb)   SpO2 98%   BMI 32.14 kg/m²     Physical Exam   Constitutional: He is oriented to person, place, and time and well-developed, well-nourished, and in no distress. No distress.   HENT:   Head: Normocephalic and atraumatic.   Eyes: Conjunctivae and EOM are normal.   Neck: Normal range of motion. Neck supple. No JVD present.   Cardiovascular: Normal rate, regular rhythm and normal heart sounds.   Pulmonary/Chest: Effort normal and breath sounds normal. No respiratory distress. He has no wheezes.   Abdominal: Soft. Bowel sounds are normal. He exhibits no distension. There is no tenderness.   Musculoskeletal: Normal range of motion. He exhibits no edema.   Neurological: He is alert and oriented to person, place, and time. GCS score is 15.   Skin: Skin is warm and dry. He is not diaphoretic.   Psychiatric: Mood, memory, affect and judgment normal.   Vitals reviewed.    Labs:  cbc, cmp, tacrolimus Latest Ref Rng & Units 10/1/2018 10/4/2018 10/17/2018   TACROLIMUS LVL 5.0 - 15.0 ng/mL - - -   WHITE BLOOD CELL COUNT 3.90 - 12.70 K/uL 6.92 - 5.51   RBC 4.60 - 6.20 M/uL 4.11(L) - 4.11(L)   HEMOGLOBIN 14.0 - 18.0 g/dL 12.2(L) - 11.8(L)   HEMATOCRIT 40.0 - 54.0 % 38.7(L) - 38.0(L)   MCV 82 - 98 fL 94 - 93   MCH 27.0 - 31.0 pg 29.7 - 28.7   MCHC 32.0 - 36.0 g/dL 31.5(L) - 31.1(L)   RDW 11.5 - 14.5 % 15.0(H) - 14.4   PLATELETS 150 - 350 K/uL 445(H) - 416(H)   MPV " 9.2 - 12.9 fL 9.0(L) - 8.8(L)   GRAN # 1.8 - 7.7 K/uL - - -   LYMPH # 1.0 - 4.8 K/uL - - -   MONO # 0.3 - 1.0 K/uL - - -   EOSINOPHIL% 0.0 - 8.0 % 1.0 - -   BASOPHIL% 0.0 - 1.9 % 4.0(H) - -   DIFFERENTIAL METHOD - Manual - -   SODIUM 136 - 145 mmol/L - 136 138   POTASSIUM 3.5 - 5.1 mmol/L - 4.4 4.2   CHLORIDE 95 - 110 mmol/L - 101 102   CO2 23 - 29 mmol/L - 25 28   GLUCOSE 70 - 110 mg/dL - 357(H) 173(H)   BUN BLD 6 - 20 mg/dL - 15 16   CREATININE 0.5 - 1.4 mg/dL - 1.2 1.0   CALCIUM 8.7 - 10.5 mg/dL - 9.2 9.6   PROTEIN TOTAL 6.0 - 8.4 g/dL - 6.7 6.6   ALBUMIN 3.5 - 5.2 g/dL - 3.7 3.7   BILIRUBIN TOTAL 0.1 - 1.0 mg/dL - 0.4 0.5   ALK PHOS 55 - 135 U/L - 89 97   AST 10 - 40 U/L - 18 22   ALT 10 - 44 U/L - 20 23   ANION GAP 8 - 16 mmol/L - 10 8   EGFR IF AFRICAN AMERICAN >60 mL/min/1.73 m:2 - >60.0 >60.0   EGFR IF NON-AFRICAN AMERICAN >60 mL/min/1.73 m:2 - >60.0 >60.0     Pulmonary Function Tests 10/17/2018 9/17/2018 8/15/2018 7/11/2018 6/6/2018 5/7/2018 4/9/2018   FVC 3.06 3.02 3.15 3.33 3.3 3.19 3.22   FEV1 2.52 2.52 2.53 2.73 2.72 2.56 2.6   TLC (liters) - - - - - - -   DLCO (ml/mmHg sec) - - - - - - -   FVC% 62.1 61.4 64 67 72 69 70   FEV1% 66.8 66.7 67 72 73 69 70   FEF 25-75 2.55 2.57 2.41 2.87 2.95 2.45 2.47   FEF 25-75% 79 79.7 74.9 89 78 65 65   TLC% - - - - - - -   RV - - - - - - -   RV% - - - - - - -   DLCO% - - - - - - -       Imaging:  Results for orders placed during the hospital encounter of 10/17/18   X-Ray Chest PA And Lateral    Narrative EXAMINATION:  XR CHEST PA AND LATERAL    CLINICAL HISTORY:  s/p bilateral lung transplant;  Lung transplant status    FINDINGS:  Heart size is normal.  There is postoperative change.  There is mild bibasilar edema versus atelectasis unchanged.  There may be a small left pleural effusion.      Impression See above      Electronically signed by: Ulysses Miles MD  Date:    10/17/2018  Time:    08:20         Assessment:  1. Lung replaced by transplant    2.  Immunosuppression    3. Prophylactic antibiotic    4. Cytomegalovirus infection, unspecified cytomegaloviral infection type    5. Gastroesophageal reflux disease without esophagitis      Plan:     1. Spirometry and FEV1 stable.  CXR with no acute findings.  Will continue to follow monthly for now. Treating the CMV as outlined below.    2. Continue Valcyte 900 mg PO BID for CMV positive serum and BAL fluid. Follow up level from today. Repeat in 2 weeks.  Level has been decreasing with treatment.     3. Continue prednisone 10 mg daily, MMF 1000 mg BID, and tacrolimus.      4. Continue Bactrim DS for PCP prophylaxis.      5. Continue famotidine 20 mg BID. EGD normal.      6. Follow up in lung transplant clinic in one month or earlier if needed.    Irwin Celestin NP  Lung Transplant  [id number]

## 2018-10-21 DIAGNOSIS — Z94.2 LUNG REPLACED BY TRANSPLANT: ICD-10-CM

## 2018-10-21 DIAGNOSIS — E83.42 HYPOMAGNESEMIA: ICD-10-CM

## 2018-10-21 RX ORDER — LANOLIN ALCOHOL/MO/W.PET/CERES
CREAM (GRAM) TOPICAL
Qty: 60 TABLET | Refills: 0 | Status: SHIPPED | OUTPATIENT
Start: 2018-10-21 | End: 2018-11-08 | Stop reason: SDUPTHER

## 2018-10-31 ENCOUNTER — LAB VISIT (OUTPATIENT)
Dept: LAB | Facility: HOSPITAL | Age: 58
End: 2018-10-31
Attending: INTERNAL MEDICINE
Payer: COMMERCIAL

## 2018-10-31 DIAGNOSIS — Z94.2 LUNG REPLACED BY TRANSPLANT: ICD-10-CM

## 2018-10-31 LAB
ANISOCYTOSIS BLD QL SMEAR: SLIGHT
BASOPHILS # BLD AUTO: ABNORMAL K/UL
BASOPHILS NFR BLD: 4 %
DIFFERENTIAL METHOD: ABNORMAL
EOSINOPHIL # BLD AUTO: ABNORMAL K/UL
EOSINOPHIL NFR BLD: 2 %
ERYTHROCYTE [DISTWIDTH] IN BLOOD BY AUTOMATED COUNT: 13.7 %
HCT VFR BLD AUTO: 37.8 %
HGB BLD-MCNC: 11.7 G/DL
IMM GRANULOCYTES # BLD AUTO: ABNORMAL K/UL
IMM GRANULOCYTES NFR BLD AUTO: ABNORMAL %
LYMPHOCYTES # BLD AUTO: ABNORMAL K/UL
LYMPHOCYTES NFR BLD: 6 %
MCH RBC QN AUTO: 28.7 PG
MCHC RBC AUTO-ENTMCNC: 31 G/DL
MCV RBC AUTO: 93 FL
METAMYELOCYTES NFR BLD MANUAL: 6 %
MONOCYTES # BLD AUTO: ABNORMAL K/UL
MONOCYTES NFR BLD: 2 %
MYELOCYTES NFR BLD MANUAL: 2 %
NEUTROPHILS NFR BLD: 70 %
NEUTS BAND NFR BLD MANUAL: 8 %
NRBC BLD-RTO: 0 /100 WBC
OVALOCYTES BLD QL SMEAR: ABNORMAL
PLATELET # BLD AUTO: 401 K/UL
PMV BLD AUTO: 9 FL
POIKILOCYTOSIS BLD QL SMEAR: SLIGHT
POLYCHROMASIA BLD QL SMEAR: ABNORMAL
RBC # BLD AUTO: 4.07 M/UL
WBC # BLD AUTO: 5.46 K/UL

## 2018-10-31 PROCEDURE — 85007 BL SMEAR W/DIFF WBC COUNT: CPT

## 2018-10-31 PROCEDURE — 36415 COLL VENOUS BLD VENIPUNCTURE: CPT | Mod: PO

## 2018-10-31 PROCEDURE — 85027 COMPLETE CBC AUTOMATED: CPT

## 2018-11-01 LAB — CMV DNA SERPL NAA+PROBE-ACNC: 483 IU/ML

## 2018-11-08 DIAGNOSIS — Z94.2 LUNG REPLACED BY TRANSPLANT: ICD-10-CM

## 2018-11-08 DIAGNOSIS — E83.42 HYPOMAGNESEMIA: ICD-10-CM

## 2018-11-08 DIAGNOSIS — Z29.89 NEED FOR PNEUMOCYSTIS PROPHYLAXIS: Primary | ICD-10-CM

## 2018-11-08 RX ORDER — LANOLIN ALCOHOL/MO/W.PET/CERES
1 CREAM (GRAM) TOPICAL 2 TIMES DAILY
Qty: 60 TABLET | Refills: 11 | Status: ON HOLD | OUTPATIENT
Start: 2018-11-08 | End: 2019-02-14 | Stop reason: SDUPTHER

## 2018-11-08 RX ORDER — SULFAMETHOXAZOLE AND TRIMETHOPRIM 800; 160 MG/1; MG/1
1 TABLET ORAL
Qty: 15 TABLET | Refills: 11 | Status: ON HOLD | OUTPATIENT
Start: 2018-11-09 | End: 2019-03-13 | Stop reason: HOSPADM

## 2018-11-08 NOTE — TELEPHONE ENCOUNTER
Received refill requests on the attached medications.  Erx entered for magnesium and bactrim and sent to Dr. Wolfe for review and approval.

## 2018-11-12 RX ORDER — PRAVASTATIN SODIUM 20 MG/1
TABLET ORAL
Qty: 90 TABLET | Refills: 3 | Status: SHIPPED | OUTPATIENT
Start: 2018-11-12 | End: 2019-11-14 | Stop reason: SDUPTHER

## 2018-11-13 DIAGNOSIS — I10 ESSENTIAL HYPERTENSION: ICD-10-CM

## 2018-11-13 DIAGNOSIS — E11.65 TYPE 2 DIABETES MELLITUS WITH HYPERGLYCEMIA, WITH LONG-TERM CURRENT USE OF INSULIN: Chronic | ICD-10-CM

## 2018-11-13 DIAGNOSIS — Z94.2 LUNG REPLACED BY TRANSPLANT: ICD-10-CM

## 2018-11-13 DIAGNOSIS — Z79.4 TYPE 2 DIABETES MELLITUS WITH HYPERGLYCEMIA, WITH LONG-TERM CURRENT USE OF INSULIN: Chronic | ICD-10-CM

## 2018-11-13 RX ORDER — FOLIC ACID 1 MG/1
TABLET ORAL
Qty: 90 TABLET | Refills: 3 | Status: SHIPPED | OUTPATIENT
Start: 2018-11-13 | End: 2019-11-10 | Stop reason: SDUPTHER

## 2018-11-13 RX ORDER — ALPHA-D-GALACTOSIDASE 400 UNIT
TABLET ORAL
Qty: 90 TABLET | Refills: 3 | Status: SHIPPED | OUTPATIENT
Start: 2018-11-13 | End: 2019-11-10 | Stop reason: SDUPTHER

## 2018-11-13 RX ORDER — AMLODIPINE BESYLATE 5 MG/1
TABLET ORAL
Qty: 90 TABLET | Refills: 3 | Status: SHIPPED | OUTPATIENT
Start: 2018-11-13 | End: 2019-01-03 | Stop reason: SDUPTHER

## 2018-11-15 DIAGNOSIS — E11.65 TYPE 2 DIABETES MELLITUS WITH HYPERGLYCEMIA, WITH LONG-TERM CURRENT USE OF INSULIN: Chronic | ICD-10-CM

## 2018-11-15 DIAGNOSIS — Z79.4 TYPE 2 DIABETES MELLITUS WITH HYPERGLYCEMIA, WITH LONG-TERM CURRENT USE OF INSULIN: Chronic | ICD-10-CM

## 2018-11-15 RX ORDER — BLOOD-GLUCOSE METER
EACH MISCELLANEOUS
Qty: 350 EACH | Refills: 3 | Status: SHIPPED | OUTPATIENT
Start: 2018-11-15

## 2018-11-19 ENCOUNTER — OFFICE VISIT (OUTPATIENT)
Dept: TRANSPLANT | Facility: CLINIC | Age: 58
End: 2018-11-19
Payer: COMMERCIAL

## 2018-11-19 ENCOUNTER — HOSPITAL ENCOUNTER (OUTPATIENT)
Dept: RADIOLOGY | Facility: HOSPITAL | Age: 58
Discharge: HOME | End: 2018-11-19
Attending: INTERNAL MEDICINE
Payer: COMMERCIAL

## 2018-11-19 ENCOUNTER — HOSPITAL ENCOUNTER (OUTPATIENT)
Dept: PULMONOLOGY | Facility: HOSPITAL | Age: 58
Discharge: HOME | End: 2018-11-19
Attending: INTERNAL MEDICINE
Payer: COMMERCIAL

## 2018-11-19 VITALS
SYSTOLIC BLOOD PRESSURE: 141 MMHG | OXYGEN SATURATION: 98 % | BODY MASS INDEX: 32.07 KG/M2 | TEMPERATURE: 98 F | RESPIRATION RATE: 20 BRPM | WEIGHT: 224 LBS | HEIGHT: 70 IN | HEART RATE: 101 BPM | DIASTOLIC BLOOD PRESSURE: 86 MMHG

## 2018-11-19 DIAGNOSIS — D84.9 IMMUNOSUPPRESSION: ICD-10-CM

## 2018-11-19 DIAGNOSIS — Z94.2 LUNG REPLACED BY TRANSPLANT: ICD-10-CM

## 2018-11-19 DIAGNOSIS — B25.9 CYTOMEGALOVIRUS INFECTION, UNSPECIFIED CYTOMEGALOVIRAL INFECTION TYPE: ICD-10-CM

## 2018-11-19 DIAGNOSIS — Z48.298 ENCOUNTER FOLLOWING ORGAN TRANSPLANT: Primary | ICD-10-CM

## 2018-11-19 DIAGNOSIS — T86.819 COMPLICATION OF TRANSPLANTED LUNG, UNSPECIFIED COMPLICATION: Primary | ICD-10-CM

## 2018-11-19 DIAGNOSIS — Z79.2 PROPHYLACTIC ANTIBIOTIC: ICD-10-CM

## 2018-11-19 DIAGNOSIS — E11.65 TYPE 2 DIABETES MELLITUS WITH HYPERGLYCEMIA, WITHOUT LONG-TERM CURRENT USE OF INSULIN: ICD-10-CM

## 2018-11-19 LAB
PRE FEV1 FVC: 81
PRE FEV1: 2.45
PRE FVC: 3.02
PREDICTED FEV1 FVC: 80
PREDICTED FEV1: 3.71
PREDICTED FVC: 4.57

## 2018-11-19 PROCEDURE — 99214 OFFICE O/P EST MOD 30 MIN: CPT | Mod: 25,S$GLB,, | Performed by: PHYSICIAN ASSISTANT

## 2018-11-19 PROCEDURE — 99999 PR PBB SHADOW E&M-EST. PATIENT-LVL III: CPT | Mod: PBBFAC,,, | Performed by: PHYSICIAN ASSISTANT

## 2018-11-19 PROCEDURE — 71046 X-RAY EXAM CHEST 2 VIEWS: CPT | Mod: 26,,, | Performed by: RADIOLOGY

## 2018-11-19 PROCEDURE — 94010 BREATHING CAPACITY TEST: CPT | Mod: 26,,, | Performed by: INTERNAL MEDICINE

## 2018-11-19 PROCEDURE — 71046 X-RAY EXAM CHEST 2 VIEWS: CPT | Mod: TC

## 2018-11-19 RX ORDER — FAMOTIDINE 20 MG/1
TABLET, FILM COATED ORAL
Qty: 180 TABLET | Refills: 3 | Status: SHIPPED | OUTPATIENT
Start: 2018-11-19 | End: 2019-11-15 | Stop reason: SDUPTHER

## 2018-11-19 NOTE — PROGRESS NOTES
LUNG TRANSPLANT RECIPIENT FOLLOW-UP     Reason for Visit: Follow-up after lung transplantation.                                                                                                         Date of Transplant: 8/22/17                                                                               Reason for Transplant: Sarcoidosis with pulmonary hypertension     Type of Transplant: bilateral sequential lung     CMV Status: D+ / R-      Major Complications:   1. Left vocal cord dysfunction   2. 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 is a 57 y.o. year old male with the above listed transplant history who presents today as routine follow up. Patient states he has been doing well from a respiratory standpoint. He reports some rhinorrhea and minor congestion over the weekend, but otherwise feels well. He denies shortness of breath, wheezing, sputum production, hemoptysis, abdominal pain, n/v/d/c, chest pain, lower extremity edema, recent hospitalizations, recent antibiotic use. He states one of his coworkers recently had a cold. He is compliant with all of his medications.     Review of Systems   Constitutional: Negative for chills, diaphoresis, fever, malaise/fatigue and weight loss.   HENT: Positive for congestion. Negative for ear discharge, ear pain, hearing loss, nosebleeds, sinus pain, sore throat and tinnitus.         Rhinorrhea   Eyes: Negative for blurred vision, double vision, photophobia, pain, discharge and redness.   Respiratory: Negative for cough, hemoptysis, sputum production, shortness of breath, wheezing and stridor.    Cardiovascular: Negative for chest pain, palpitations, orthopnea, claudication, leg swelling and PND.   Gastrointestinal: Negative for abdominal pain, blood in stool, constipation, diarrhea,  "heartburn, melena, nausea and vomiting.   Genitourinary: Negative for dysuria, flank pain, frequency, hematuria and urgency.   Musculoskeletal: Negative for back pain, falls, joint pain, myalgias and neck pain.   Skin: Negative for itching and rash.   Neurological: Negative for dizziness, tingling, tremors, sensory change, speech change, focal weakness, seizures, loss of consciousness, weakness and headaches.   Endo/Heme/Allergies: Negative for environmental allergies and polydipsia. Does not bruise/bleed easily.   Psychiatric/Behavioral: Negative for depression, hallucinations, memory loss, substance abuse and suicidal ideas. The patient is not nervous/anxious and does not have insomnia.      BP (!) 141/86   Pulse 101   Temp 97.6 °F (36.4 °C) (Oral)   Resp 20   Ht 5' 10" (1.778 m)   Wt 101.6 kg (224 lb)   SpO2 98%   BMI 32.14 kg/m²     Physical Exam   Constitutional: He is oriented to person, place, and time and well-developed, well-nourished, and in no distress. No distress.   HENT:   Head: Normocephalic and atraumatic.   Nose: Nose normal.   Eyes: Conjunctivae and EOM are normal. No scleral icterus.   Neck: Normal range of motion. Neck supple. No JVD present. No tracheal deviation present.   Cardiovascular: Regular rhythm, normal heart sounds and intact distal pulses. Tachycardia present. Exam reveals no gallop and no friction rub.   No murmur heard.  Pulmonary/Chest: Effort normal. No stridor. No respiratory distress. He has no wheezes. He has no rales. He exhibits no tenderness.   Abdominal: Soft. Bowel sounds are normal. He exhibits no distension. There is no tenderness.   Musculoskeletal: Normal range of motion. He exhibits no edema, tenderness or deformity.   Neurological: He is alert and oriented to person, place, and time. Gait normal.   Skin: Skin is warm and dry. No rash noted. He is not diaphoretic. No erythema. No pallor.   Psychiatric: Mood, memory, affect and judgment normal.   Nursing note " and vitals reviewed.    Labs:  cbc, cmp, tacrolimus Latest Ref Rng & Units 10/17/2018 10/31/2018 11/19/2018   TACROLIMUS LVL 5.0 - 15.0 ng/mL 10.1 - -   WHITE BLOOD CELL COUNT 3.90 - 12.70 K/uL 5.51 5.46 4.29   RBC 4.60 - 6.20 M/uL 4.11(L) 4.07(L) 4.33(L)   HEMOGLOBIN 14.0 - 18.0 g/dL 11.8(L) 11.7(L) 12.6(L)   HEMATOCRIT 40.0 - 54.0 % 38.0(L) 37.8(L) 39.5(L)   MCV 82 - 98 fL 93 93 91   MCH 27.0 - 31.0 pg 28.7 28.7 29.1   MCHC 32.0 - 36.0 g/dL 31.1(L) 31.0(L) 31.9(L)   RDW 11.5 - 14.5 % 14.4 13.7 13.3   PLATELETS 150 - 350 K/uL 416(H) 401(H) 338   MPV 9.2 - 12.9 fL 8.8(L) 9.0(L) 8.6(L)   GRAN # 1.8 - 7.7 K/uL - - -   LYMPH # 1.0 - 4.8 K/uL CANCELED CANCELED -   MONO # 0.3 - 1.0 K/uL CANCELED CANCELED -   EOSINOPHIL% 0.0 - 8.0 % 0.0 2.0 -   BASOPHIL% 0.0 - 1.9 % 0.0 4.0(H) -   DIFFERENTIAL METHOD - Manual Manual -   SODIUM 136 - 145 mmol/L 138 - 139   POTASSIUM 3.5 - 5.1 mmol/L 4.2 - 4.4   CHLORIDE 95 - 110 mmol/L 102 - 101   CO2 23 - 29 mmol/L 28 - 29   GLUCOSE 70 - 110 mg/dL 173(H) - 241(H)   BUN BLD 6 - 20 mg/dL 16 - 11   CREATININE 0.5 - 1.4 mg/dL 1.0 - 1.0   CALCIUM 8.7 - 10.5 mg/dL 9.6 - 9.6   PROTEIN TOTAL 6.0 - 8.4 g/dL 6.6 - 6.9   ALBUMIN 3.5 - 5.2 g/dL 3.7 - 3.8   BILIRUBIN TOTAL 0.1 - 1.0 mg/dL 0.5 - 0.7   ALK PHOS 55 - 135 U/L 97 - 89   AST 10 - 40 U/L 22 - 26   ALT 10 - 44 U/L 23 - 28   ANION GAP 8 - 16 mmol/L 8 - 9   EGFR IF AFRICAN AMERICAN >60 mL/min/1.73 m:2 >60.0 - >60.0   EGFR IF NON-AFRICAN AMERICAN >60 mL/min/1.73 m:2 >60.0 - >60.0     Pulmonary Function Tests 11/19/2018 10/17/2018 9/17/2018 8/15/2018 7/11/2018 6/6/2018 5/7/2018   FVC 3.02 3.06 3.02 3.15 3.33 3.3 3.19   FEV1 2.45 2.52 2.52 2.53 2.73 2.72 2.56   TLC (liters) - - - - - - -   DLCO (ml/mmHg sec) - - - - - - -   FVC% 61.3 62.1 61.4 64 67 72 69   FEV1% 64.9 66.8 66.7 67 72 73 69   FEF 25-75 2.47 2.55 2.57 2.41 2.87 2.95 2.45   FEF 25-75% 76.5 79 79.7 74.9 89 78 65   TLC% - - - - - - -   RV - - - - - - -   RV% - - - - - - -   DLCO% - - -  - - - -       Imaging:  Results for orders placed during the hospital encounter of 11/19/18   X-Ray Chest PA And Lateral    Narrative EXAMINATION:  XR CHEST PA AND LATERAL    CLINICAL HISTORY:  s/p bilateral lung transplant;  Lung transplant status    FINDINGS:  There is postoperative change.  Heart is normal lungs are clear.  The bones bowel gas are noncontributory.  There is chronic left pleural reaction unchanged.      Impression See above    No acute process seen.      Electronically signed by: Ulysses Miles MD  Date:    11/19/2018  Time:    08:32       Assessment:  1. Encounter following organ transplant    2. Lung replaced by transplant    3. Immunosuppression    4. Prophylactic antibiotic    5. Cytomegalovirus infection, unspecified cytomegaloviral infection type    6. Type 2 diabetes mellitus with hyperglycemia, without long-term current use of insulin      Plan:     1. FEV1 down today. CXR without acute process. Will plan for bronchoscopy next week.     2. Continue tacrolimus, prednisone, and MMF. CBC/CMP reviewed. Will review tacrolimus levels and adjust dose as needed.     3. Continue bactrim MWF.     4. Patient with elevated CMV PCR from 8/22 bronchoscopy. Continue Valcyte 900 mg BID. Will follow up on CMV PCR today.     5. Continue current diabetes regimen. Follow up with endocrinology as scheduled.       Teresa Trejo PA-C

## 2018-11-19 NOTE — PROGRESS NOTES
Received torb from Dr. Wolfe with instructions to schedule a bronchoscopy for decreased PFTs.  Orders entered and submitted for scheduling.  Patient has been contacted and informed of the date and time.  Pre-procedure instructions reviewed with the patient and patient verbalized understanding of all information discussed.

## 2018-11-20 ENCOUNTER — HOSPITAL ENCOUNTER (OUTPATIENT)
Facility: HOSPITAL | Age: 58
Discharge: HOME | End: 2018-11-20
Attending: INTERNAL MEDICINE | Admitting: INTERNAL MEDICINE
Payer: COMMERCIAL

## 2018-11-20 VITALS
OXYGEN SATURATION: 97 % | WEIGHT: 220 LBS | TEMPERATURE: 98 F | HEIGHT: 71 IN | RESPIRATION RATE: 18 BRPM | DIASTOLIC BLOOD PRESSURE: 76 MMHG | BODY MASS INDEX: 30.8 KG/M2 | HEART RATE: 92 BPM | SYSTOLIC BLOOD PRESSURE: 124 MMHG

## 2018-11-20 DIAGNOSIS — T86.819 COMPLICATION OF TRANSPLANTED LUNG, UNSPECIFIED COMPLICATION: ICD-10-CM

## 2018-11-20 DIAGNOSIS — T86.819 COMPLICATION OF TRANSPLANTED LUNG: Primary | ICD-10-CM

## 2018-11-20 LAB
APPEARANCE FLD: CLEAR
BODY FLD TYPE: NORMAL
COLOR FLD: COLORLESS
LYMPHOCYTES NFR FLD MANUAL: 6 %
MONOS+MACROS NFR FLD MANUAL: 90 %
NEUTROPHILS NFR FLD MANUAL: 4 %
POCT GLUCOSE: 218 MG/DL (ref 70–110)
POCT GLUCOSE: 238 MG/DL (ref 70–110)
WBC # FLD: 39 /CU MM

## 2018-11-20 PROCEDURE — 82962 GLUCOSE BLOOD TEST: CPT

## 2018-11-20 PROCEDURE — 87116 MYCOBACTERIA CULTURE: CPT

## 2018-11-20 PROCEDURE — 87581 M.PNEUMON DNA AMP PROBE: CPT

## 2018-11-20 PROCEDURE — 87102 FUNGUS ISOLATION CULTURE: CPT

## 2018-11-20 PROCEDURE — 31623 DX BRONCHOSCOPE/BRUSH: CPT | Performed by: INTERNAL MEDICINE

## 2018-11-20 PROCEDURE — 63600175 PHARM REV CODE 636 W HCPCS: Performed by: INTERNAL MEDICINE

## 2018-11-20 PROCEDURE — 87205 SMEAR GRAM STAIN: CPT | Mod: 59

## 2018-11-20 PROCEDURE — 88312 SPECIAL STAINS GROUP 1: CPT | Mod: 26,,, | Performed by: PATHOLOGY

## 2018-11-20 PROCEDURE — 89051 BODY FLUID CELL COUNT: CPT

## 2018-11-20 PROCEDURE — 31628 BRONCHOSCOPY/LUNG BX EACH: CPT | Mod: RT,,, | Performed by: INTERNAL MEDICINE

## 2018-11-20 PROCEDURE — 87206 SMEAR FLUORESCENT/ACID STAI: CPT

## 2018-11-20 PROCEDURE — 87015 SPECIMEN INFECT AGNT CONCNTJ: CPT | Mod: 59

## 2018-11-20 PROCEDURE — 88313 SPECIAL STAINS GROUP 2: CPT | Performed by: PATHOLOGY

## 2018-11-20 PROCEDURE — 27201011 HC FORCEPS DISPOSABLE: Performed by: INTERNAL MEDICINE

## 2018-11-20 PROCEDURE — 88305 TISSUE EXAM BY PATHOLOGIST: CPT | Mod: 26,,, | Performed by: PATHOLOGY

## 2018-11-20 PROCEDURE — 31623 DX BRONCHOSCOPE/BRUSH: CPT | Mod: 59,LT,, | Performed by: INTERNAL MEDICINE

## 2018-11-20 PROCEDURE — 99153 MOD SED SAME PHYS/QHP EA: CPT | Performed by: INTERNAL MEDICINE

## 2018-11-20 PROCEDURE — 99152 MOD SED SAME PHYS/QHP 5/>YRS: CPT | Performed by: INTERNAL MEDICINE

## 2018-11-20 PROCEDURE — 31624 DX BRONCHOSCOPE/LAVAGE: CPT | Mod: 59,RT,, | Performed by: INTERNAL MEDICINE

## 2018-11-20 PROCEDURE — 87070 CULTURE OTHR SPECIMN AEROBIC: CPT | Mod: 59

## 2018-11-20 PROCEDURE — 87305 ASPERGILLUS AG IA: CPT

## 2018-11-20 PROCEDURE — 25000003 PHARM REV CODE 250: Performed by: INTERNAL MEDICINE

## 2018-11-20 PROCEDURE — 31628 BRONCHOSCOPY/LUNG BX EACH: CPT | Performed by: INTERNAL MEDICINE

## 2018-11-20 PROCEDURE — 88313 SPECIAL STAINS GROUP 2: CPT | Mod: 26,,, | Performed by: PATHOLOGY

## 2018-11-20 PROCEDURE — 31624 DX BRONCHOSCOPE/LAVAGE: CPT | Performed by: INTERNAL MEDICINE

## 2018-11-20 RX ORDER — FENTANYL CITRATE 50 UG/ML
INJECTION, SOLUTION INTRAMUSCULAR; INTRAVENOUS CODE/TRAUMA/SEDATION MEDICATION
Status: COMPLETED | OUTPATIENT
Start: 2018-11-20 | End: 2018-11-20

## 2018-11-20 RX ORDER — LIDOCAINE HYDROCHLORIDE 20 MG/ML
INJECTION, SOLUTION INFILTRATION; PERINEURAL CODE/TRAUMA/SEDATION MEDICATION
Status: COMPLETED | OUTPATIENT
Start: 2018-11-20 | End: 2018-11-20

## 2018-11-20 RX ORDER — MIDAZOLAM HYDROCHLORIDE 5 MG/ML
INJECTION INTRAMUSCULAR; INTRAVENOUS CODE/TRAUMA/SEDATION MEDICATION
Status: COMPLETED | OUTPATIENT
Start: 2018-11-20 | End: 2018-11-20

## 2018-11-20 RX ORDER — LIDOCAINE HYDROCHLORIDE 10 MG/ML
INJECTION INFILTRATION; PERINEURAL CODE/TRAUMA/SEDATION MEDICATION
Status: COMPLETED | OUTPATIENT
Start: 2018-11-20 | End: 2018-11-20

## 2018-11-20 RX ORDER — LIDOCAINE HYDROCHLORIDE 20 MG/ML
SOLUTION OROPHARYNGEAL CODE/TRAUMA/SEDATION MEDICATION
Status: COMPLETED | OUTPATIENT
Start: 2018-11-20 | End: 2018-11-20

## 2018-11-20 RX ADMIN — LIDOCAINE HYDROCHLORIDE 6 ML: 20 SOLUTION OROPHARYNGEAL at 08:11

## 2018-11-20 RX ADMIN — LIDOCAINE HYDROCHLORIDE 6 ML: 20 INJECTION, SOLUTION INFILTRATION; PERINEURAL at 08:11

## 2018-11-20 RX ADMIN — MIDAZOLAM 4 MG: 5 INJECTION INTRAMUSCULAR; INTRAVENOUS at 08:11

## 2018-11-20 RX ADMIN — TOPICAL ANESTHETIC 0.5 ML: 200 SPRAY DENTAL; PERIODONTAL at 08:11

## 2018-11-20 RX ADMIN — LIDOCAINE HYDROCHLORIDE 6 ML: 10 INJECTION, SOLUTION INFILTRATION; PERINEURAL at 08:11

## 2018-11-20 RX ADMIN — FENTANYL CITRATE 100 MCG: 50 INJECTION, SOLUTION INTRAMUSCULAR; INTRAVENOUS at 08:11

## 2018-11-20 NOTE — DISCHARGE INSTRUCTIONS
Flexible Bronchoscopy  A flexible bronchoscopy is an exam of the airways of your lungs. A thin, flexible tube called a bronchoscope is used. It has a light and small camera that allow the healthcare provider to view your airways.    Before your test  · Follow your healthcare provider's instructions carefully. If you dont, the exam may be canceled. Or you may need to take it again.  · If you are taking blood-thinning medicine, ask your healthcare provider if you should stop taking the medicine before this test.  · Have no food or drink for at least 8 hours before the test. Also, avoid smoking for 24 hours before the test.  · You will need to remove any dentures or removable devices from your mouth.  · Right before the test, you will be given sedating medicines to help you relax. The medicine may be given by an IV (intravenously) into one of your veins. In addition, your nose and throat may be numbed with a special spray to help prevent gagging and coughing.  · If you are having this test as an outpatient, make sure you have an adult friend or family member to drive you home.  During your test  Bronchoscopy takes 45 to 60 minutes and includes the following steps:  · You may be given medicine (anesthesia) so that you are unconscious or asleep during the procedure.  · The healthcare provider inserts the tube into your nose or mouth.  · If you have not been given anesthesia, you might feel a gagging sensation. To help ease this feeling, you will be told to swallow or take deep breaths. Your airway will remain open even with the tube in place. But you wont be able to talk.  · The provider checks your breathing passage. He or she may also remove tiny tissue samples for biopsy.  After your test  · You may have a mild sore throat or cough. Your voice may also be hoarse.  · Don't eat or drink until the anesthesia wears off.  · If you had a biopsy, you might see traces of blood being coughed up.  When to call your  healthcare provider  Call your healthcare provider right away if you have any of the following:  · Shortness of breath  · Chest pain  · Bleeding from your nose or throat  · Coughing up a large amount of blood  · A fever above 100.4°F (38°C) for more than 24 hours  Call 911  Call 911 if you have:  · Chest pain  · Severe shortness of breath   Date Last Reviewed: 12/1/2016  © 3620-1178 [employer]. [address] [place] [address], [place] [address]. All rights reserved. This information is not intended as a substitute for professional medical care. Always follow your healthcare professional's instructions.

## 2018-11-20 NOTE — PROGRESS NOTES
Plan of care reviewed with pt, he verbalized understanding, pt progressing with plan of care, denies nausea & pain, tolerating PO, reviewed all DC instructions, home meds, when to call MD, when to follow-up, answered questions.    Spoke to Mariana Trejo with IR, chest x-ray looks fine, OK to let pt drink & be discharged.

## 2018-11-20 NOTE — INTERVAL H&P NOTE
The patient has been examined and the H&P has been reviewed:    I concur with the findings and no changes have occurred since H&P was written.    Anesthesia/Surgery risks, benefits and alternative options discussed and understood by patient/family.          Active Hospital Problems    Diagnosis  POA    Complication of transplanted lung [T86.819]  Yes      Resolved Hospital Problems   No resolved problems to display.

## 2018-11-20 NOTE — DISCHARGE SUMMARY
[facility]  Lung Transplant  Discharge Summary      Patient Name: Martin Hendrix Jr.  MRN: [id number]  Admission Date: 11/20/2018  Hospital Length of Stay: 0 days  Discharge Date and Time: 11/20/2018 8:35 AM  Attending Physician: Samantha Bill DO   Discharging Provider: Samantha Bill DO  Primary Care Provider: Sukhi Dunham Jr, MD     HPI: No notes on file    Procedure(s) (LRB):  flexible bronchoscopy with tissue biopy CPT 09971 (N/A)     Hospital Course: Underwent successful surveillance bronchoscopy without apparent complications.        Significant Diagnostic Studies: Bronchoscopy: see full bronchoscopy note    Pending Diagnostic Studies:     Procedure Component Value Units Date/Time    X-Ray Chest AP Single View [[id number]]     Order Status:  Sent Lab Status:  No result         Final Active Diagnoses:    Diagnosis Date Noted POA    PRINCIPAL PROBLEM:  Complication of transplanted lung [T86.819] 11/02/2017 Yes      Problems Resolved During this Admission:      Discharged Condition: good    Disposition:     Medications:  Reconciled Home Medications:      Medication List      CONTINUE taking these medications    amLODIPine 5 MG tablet  Commonly known as:  NORVASC  TAKE 1 TABLET DAILY     calcium-vitamin D3 600 mg(1,500mg) -200 unit Tab  Commonly known as:  CALCIUM 600 WITH VITAMIN D3  Take 1 tablet by mouth 2 (two) times daily.     ECOTRIN LOW STRENGTH 81 MG EC tablet  Generic drug:  aspirin  TAKE 1 TABLET DAILY     famotidine 20 MG tablet  Commonly known as:  PEPCID  TAKE 1 TABLET TWICE A DAY     fluticasone 50 mcg/actuation nasal spray  Commonly known as:  FLONASE  1 spray by Each Nare route once daily.     folic acid 1 MG tablet  Commonly known as:  FOLVITE  TAKE 1 TABLET DAILY     furosemide 40 MG tablet  Commonly known as:  LASIX  Take 1 tablet (40 mg total) by mouth daily as needed.     insulin degludec 200 unit/mL (3 mL) Inpn  Commonly known as:  TRESIBA FLEXTOUCH  "U-200  Inject 20 Units into the skin every evening.     insulin lispro 100 unit/mL Inpn pen  Commonly known as:  HumaLOG KwikPen Insulin  Inject 14 Units into the skin 3 (three) times daily before meals. + correction scale TDD: 60 units     lancets Misc  To check BG 4 times daily, to use with insurance preferred meter     magnesium oxide 400 mg (241.3 mg magnesium) tablet  Commonly known as:  MAG-OX  Take 1 tablet (400 mg total) by mouth 2 (two) times daily.     metFORMIN 500 MG 24 hr tablet  Commonly known as:  GLUCOPHAGE-XR  Take 2 tablets (1,000 mg total) by mouth 2 (two) times daily with meals.     multivitamin per tablet  Commonly known as:  THERAGRAN  Take 1 tablet by mouth once daily.     mycophenolate 250 mg Cap  Commonly known as:  CELLCEPT  Take 4 capsules (1,000 mg total) by mouth 2 (two) times daily.     ONETOUCH VERIO Strp  Generic drug:  blood sugar diagnostic  USE TO CHECK BLOOD GLUCOSE FOUR TIMES A DAY, TO USE WITH INSURANCE PREFERRED METER     pen needle, diabetic 32 gauge x 5/32" Ndle  Commonly known as:  BD ULTRA-FINE JIM PEN NEEDLE  Uses 4 times daily, on multiple daily insulin injections     pravastatin 20 MG tablet  Commonly known as:  PRAVACHOL  TAKE 1 TABLET BY MOUTH ONCE DAILY     predniSONE 10 MG tablet  Commonly known as:  DELTASONE  Take 5 mg by mouth once daily.     sulfamethoxazole-trimethoprim 800-160mg 800-160 mg Tab  Commonly known as:  BACTRIM DS  Take 1 tablet by mouth every Mon, Wed, Fri.     tacrolimus 0.5 MG Cap  Commonly known as:  PROGRAF  Take 5 capsules (2.5 mg) by mouth every morning and 4 capsules (2 mg) every evening.     valGANciclovir 450 mg Tab  Commonly known as:  VALCYTE  Take 2 tablets (900 mg total) by mouth 2 (two) times daily.        ASK your doctor about these medications    ACCU-CHEK DEANNE PLUS METER Misc  Generic drug:  blood-glucose meter  USE AS DIRECTED          Patient Instructions:      Diet general     Call MD for:  temperature >101     Call MD for:  " coughing up blood greater than 3 tablespoons in volume     Call MD for:  chest pain     Call MD for:  difficulty breathing or shortness of breath     Call MD for:  development of yellow/green sputum     Activity as tolerated     Follow Up:  Follow-up Information     Darrick Wolfe MD.    Specialty:  Transplant  Why:  As needed  Contact information:  [address]  [facility] [address]  [phone]                   Samantha Bill, DO  Lung Transplant  [facility]

## 2018-11-20 NOTE — SEDATION DOCUMENTATION
Specimens obtained during Bronchoscopy:  BAL RML 90 ml nacl in 30 ml return also send for Galactomannan, TBBX RLL, [place] micro L main.  Verbal report given to DOSC  to include documentation charted in procedural sedation documentation.  Patient to be NPO 1 hour post procedure and place in PO tolerance at 0915, CXR needed at bedside.  Moderate concious sedation was performed and cardiorespiratory functions were monitored the entire procedure by Ranjana Kimbrough RN.  Sedation began at 0802  and concluded at 0833.  Ranjana Kimbrough RN

## 2018-11-21 LAB
ENTEROVIRUS: NOT DETECTED
GALACTOMANNAN AG SPEC-ACNC: <0.5 INDEX
HUMAN BOCAVIRUS: NOT DETECTED
HUMAN CORONAVIRUS, COMMON COLD VIRUS: NOT DETECTED
INFLUENZA A - H1N1-09: NOT DETECTED
LEGIONELLA PNEUMOPHILA: NOT DETECTED
MORAXELLA CATARRHALIS: NOT DETECTED
PARAINFLUENZA: NOT DETECTED
RVP - ADENOVIRUS: NOT DETECTED
RVP - HUMAN METAPNEUMOVIRUS (HMPV): NOT DETECTED
RVP - INFLUENZA A: NOT DETECTED
RVP - INFLUENZA B: NOT DETECTED
RVP - RESPIRATORY SYNCTIAL VIRUS (RSV) A: NOT DETECTED
RVP - RESPIRATORY VIRAL PANEL, SOURCE: NORMAL
RVP - RHINOVIRUS: NOT DETECTED
TEM - ACINETOBACTER BAUMANNII: NOT DETECTED
TEM - BORDETELLA PERTUSSIS: NOT DETECTED
TEM - CHLAMYDOPHILA PNEUMONIAE: NOT DETECTED
TEM - KLEBSIELLA PNEUMONIAE: NOT DETECTED
TEM - MRSA: NOT DETECTED
TEM - MYCOPLASMA PNEUMONIAE: NOT DETECTED
TEM - NEISSERIA MENINGITIDIS: NOT DETECTED
TEM - PANTON-VALENTINE: NOT DETECTED
TEM - PSEUDOMONAS AERUGINOSA: NOT DETECTED
TEM - STAPHYLOCOCCUS AUREUS: NOT DETECTED
TEM - STREPTOCOCCUS PNEUMONIAE: NOT DETECTED
TEM - STREPTOCOCCUS PYOGENES A: NOT DETECTED
TEM- HAEMOPHILUS INFLUENZAE B: NOT DETECTED
TEM- HAEMOPHILUS INFLUENZAE: NOT DETECTED

## 2018-11-21 NOTE — [employer]ATION PATIENT INSTRUCTIONS
Discharge Summary/Instructions after an Endoscopic Procedure  Patient Name: Martin Hendrix  Patient MRN: [id number]  Patient YOB: 1960 Monday, August 06, 2018  Ramu Eisenberg MD  RESTRICTIONS:  During your procedure today, you received medications for sedation.  These   medications may affect your judgment, balance and coordination.  Therefore,   for 24 hours, you have the following restrictions:   - DO NOT drive a car, operate machinery, make legal/financial decisions,   sign important papers or drink alcohol.    ACTIVITY:  Today: no heavy lifting, straining or running due to procedural   sedation/anesthesia.  The following day: return to full activity including work.  DIET:  Eat and drink normally unless instructed otherwise.     TREATMENT FOR COMMON SIDE EFFECTS:  - Mild abdominal pain, nausea, belching, bloating or excessive gas:  rest,   eat lightly and use a heating pad.  - Sore Throat: treat with throat lozenges and/or gargle with warm salt   water.  - Because air was used during the procedure, expelling large amounts of air   from your rectum or belching is normal.  - If a bowel prep was taken, you may not have a bowel movement for 1-3 days.    This is normal.  SYMPTOMS TO WATCH FOR AND REPORT TO YOUR PHYSICIAN:  1. Abdominal pain or bloating, other than gas cramps.  2. Chest pain.  3. Back pain.  4. Signs of infection such as: chills or fever occurring within 24 hours   after the procedure.  5. Rectal bleeding, which would show as bright red, maroon, or black stools.   (A tablespoon of blood from the rectum is not serious, especially if   hemorrhoids are present.)  6. Vomiting.  7. Weakness or dizziness.  GO DIRECTLY TO THE NEAREST EMERGENCY ROOM IF YOU HAVE ANY OF THE FOLLOWING:      Difficulty breathing              Chills and/or fever over 101 F   Persistent vomiting and/or vomiting blood   Severe abdominal pain   Severe chest pain   Black, tarry stools   Bleeding- more than one  tablespoon   Any other symptom or condition that you feel may need urgent attention  Your doctor recommends these additional instructions:  If any biopsies were taken, your doctors clinic will contact you in 1 to 2   weeks with any results.  - Return to referring physician as previously scheduled.   For questions, problems or results please call your physician - Ramu Eisenberg MD at Work:  [phone].  [facility], EMERGENCY ROOM PHONE NUMBER: [phone]  IF A COMPLICATION OR EMERGENCY SITUATION ARISES AND YOU ARE UNABLE TO REACH   YOUR PHYSICIAN - GO DIRECTLY TO THE EMERGENCY ROOM.  Ramu Eisenberg MD  11/21/2018 11:39:27 AM  This report has been verified and signed electronically.  [employer]

## 2018-11-22 LAB
CMV DNA # SERPL NAA+PROBE: ABNORMAL CPY/ML
CMV DNA SERPL NAA+PROBE-ACNC: ABNORMAL IU/ML
CMV DNA SERPL NAA+PROBE-LOG IU: ABNORMAL LOG IU/ML
CMV DNA SERPL NAA+PROBE-LOG#: ABNORMAL LOG CPY/ML
CMV GENE MUT DET ISLT: DETECTED
SPECIMEN SOURCE: ABNORMAL

## 2018-11-23 LAB
BACTERIA SPEC AEROBE CULT: NO GROWTH
BACTERIA SPEC AEROBE CULT: NORMAL
GRAM STN SPEC: NORMAL

## 2018-11-25 DIAGNOSIS — E83.42 HYPOMAGNESEMIA: ICD-10-CM

## 2018-11-25 DIAGNOSIS — Z94.2 LUNG REPLACED BY TRANSPLANT: ICD-10-CM

## 2018-11-25 RX ORDER — LANOLIN ALCOHOL/MO/W.PET/CERES
CREAM (GRAM) TOPICAL
Qty: 60 TABLET | Refills: 0 | Status: SHIPPED | OUTPATIENT
Start: 2018-11-25 | End: 2018-12-14 | Stop reason: SDUPTHER

## 2018-11-26 ENCOUNTER — PATIENT MESSAGE (OUTPATIENT)
Dept: TRANSPLANT | Facility: CLINIC | Age: 58
End: 2018-11-26

## 2018-11-30 ENCOUNTER — HOSPITAL ENCOUNTER (OUTPATIENT)
Dept: PULMONOLOGY | Facility: CLINIC | Age: 58
Discharge: HOME | End: 2018-11-30
Payer: COMMERCIAL

## 2018-11-30 DIAGNOSIS — Z94.2 LUNG REPLACED BY TRANSPLANT: ICD-10-CM

## 2018-11-30 LAB
PRE FEV1 FVC: 81
PRE FEV1: 2.5
PRE FVC: 3.1
PREDICTED FEV1 FVC: 80
PREDICTED FEV1: 3.68
PREDICTED FVC: 4.54

## 2018-11-30 PROCEDURE — 94010 BREATHING CAPACITY TEST: CPT | Mod: S$GLB,,, | Performed by: INTERNAL MEDICINE

## 2018-12-03 DIAGNOSIS — B25.9 CYTOMEGALOVIRUS INFECTION, UNSPECIFIED CYTOMEGALOVIRAL INFECTION TYPE: Primary | ICD-10-CM

## 2018-12-04 ENCOUNTER — DOCUMENTATION ONLY (OUTPATIENT)
Dept: TRANSPLANT | Facility: CLINIC | Age: 58
End: 2018-12-04

## 2018-12-06 ENCOUNTER — RESEARCH ENCOUNTER (OUTPATIENT)
Dept: INFECTIOUS DISEASES | Facility: CLINIC | Age: 58
End: 2018-12-06
Payer: COMMERCIAL

## 2018-12-06 ENCOUNTER — LAB VISIT (OUTPATIENT)
Dept: LAB | Facility: HOSPITAL | Age: 58
End: 2018-12-06
Attending: INTERNAL MEDICINE
Payer: COMMERCIAL

## 2018-12-06 ENCOUNTER — OFFICE VISIT (OUTPATIENT)
Dept: INFECTIOUS DISEASES | Facility: CLINIC | Age: 58
End: 2018-12-06
Payer: COMMERCIAL

## 2018-12-06 VITALS
DIASTOLIC BLOOD PRESSURE: 91 MMHG | BODY MASS INDEX: 31.7 KG/M2 | SYSTOLIC BLOOD PRESSURE: 147 MMHG | TEMPERATURE: 99 F | HEART RATE: 101 BPM | WEIGHT: 226.44 LBS | HEIGHT: 71 IN

## 2018-12-06 DIAGNOSIS — T86.812 LUNG TRANSPLANT INFECTION: ICD-10-CM

## 2018-12-06 DIAGNOSIS — B25.8 OTHER CYTOMEGALOVIRAL DISEASES: ICD-10-CM

## 2018-12-06 DIAGNOSIS — D86.0 SARCOIDOSIS OF LUNG: ICD-10-CM

## 2018-12-06 DIAGNOSIS — E11.65 TYPE 2 DIABETES MELLITUS WITH HYPERGLYCEMIA, WITH LONG-TERM CURRENT USE OF INSULIN: Chronic | ICD-10-CM

## 2018-12-06 DIAGNOSIS — Z94.2 LUNG TRANSPLANTED: Primary | ICD-10-CM

## 2018-12-06 DIAGNOSIS — Z79.4 TYPE 2 DIABETES MELLITUS WITH HYPERGLYCEMIA, WITH LONG-TERM CURRENT USE OF INSULIN: Chronic | ICD-10-CM

## 2018-12-06 LAB
ALBUMIN SERPL BCP-MCNC: 3.6 G/DL
ALBUMIN SERPL BCP-MCNC: 3.6 G/DL
ALP SERPL-CCNC: 88 U/L
ALP SERPL-CCNC: 88 U/L
ALT SERPL W/O P-5'-P-CCNC: 30 U/L
ALT SERPL W/O P-5'-P-CCNC: 30 U/L
ANION GAP SERPL CALC-SCNC: 7 MMOL/L
ANION GAP SERPL CALC-SCNC: 7 MMOL/L
ANISOCYTOSIS BLD QL SMEAR: SLIGHT
ANISOCYTOSIS BLD QL SMEAR: SLIGHT
AST SERPL-CCNC: 28 U/L
AST SERPL-CCNC: 28 U/L
BASOPHILS # BLD AUTO: ABNORMAL K/UL
BASOPHILS # BLD AUTO: ABNORMAL K/UL
BASOPHILS NFR BLD: 1 %
BASOPHILS NFR BLD: 1 %
BILIRUB SERPL-MCNC: 0.5 MG/DL
BILIRUB SERPL-MCNC: 0.5 MG/DL
BUN SERPL-MCNC: 17 MG/DL
BUN SERPL-MCNC: 17 MG/DL
CALCIUM SERPL-MCNC: 9.4 MG/DL
CALCIUM SERPL-MCNC: 9.4 MG/DL
CHLORIDE SERPL-SCNC: 99 MMOL/L
CHLORIDE SERPL-SCNC: 99 MMOL/L
CO2 SERPL-SCNC: 28 MMOL/L
CO2 SERPL-SCNC: 28 MMOL/L
CREAT SERPL-MCNC: 1.2 MG/DL
CREAT SERPL-MCNC: 1.2 MG/DL
DIFFERENTIAL METHOD: ABNORMAL
DIFFERENTIAL METHOD: ABNORMAL
EOSINOPHIL # BLD AUTO: ABNORMAL K/UL
EOSINOPHIL # BLD AUTO: ABNORMAL K/UL
EOSINOPHIL NFR BLD: 0 %
EOSINOPHIL NFR BLD: 0 %
ERYTHROCYTE [DISTWIDTH] IN BLOOD BY AUTOMATED COUNT: 13.1 %
ERYTHROCYTE [DISTWIDTH] IN BLOOD BY AUTOMATED COUNT: 13.1 %
EST. GFR  (AFRICAN AMERICAN): >60 ML/MIN/1.73 M^2
EST. GFR  (AFRICAN AMERICAN): >60 ML/MIN/1.73 M^2
EST. GFR  (NON AFRICAN AMERICAN): >60 ML/MIN/1.73 M^2
EST. GFR  (NON AFRICAN AMERICAN): >60 ML/MIN/1.73 M^2
GLUCOSE SERPL-MCNC: 425 MG/DL
GLUCOSE SERPL-MCNC: 425 MG/DL
HCT VFR BLD AUTO: 38.3 %
HCT VFR BLD AUTO: 38.3 %
HGB BLD-MCNC: 12.3 G/DL
HGB BLD-MCNC: 12.3 G/DL
HYPOCHROMIA BLD QL SMEAR: ABNORMAL
HYPOCHROMIA BLD QL SMEAR: ABNORMAL
IMM GRANULOCYTES # BLD AUTO: ABNORMAL K/UL
IMM GRANULOCYTES # BLD AUTO: ABNORMAL K/UL
IMM GRANULOCYTES NFR BLD AUTO: ABNORMAL %
IMM GRANULOCYTES NFR BLD AUTO: ABNORMAL %
LYMPHOCYTES # BLD AUTO: ABNORMAL K/UL
LYMPHOCYTES # BLD AUTO: ABNORMAL K/UL
LYMPHOCYTES NFR BLD: 3 %
LYMPHOCYTES NFR BLD: 3 %
MCH RBC QN AUTO: 28.9 PG
MCH RBC QN AUTO: 28.9 PG
MCHC RBC AUTO-ENTMCNC: 32.1 G/DL
MCHC RBC AUTO-ENTMCNC: 32.1 G/DL
MCV RBC AUTO: 90 FL
MCV RBC AUTO: 90 FL
METAMYELOCYTES NFR BLD MANUAL: 7 %
METAMYELOCYTES NFR BLD MANUAL: 7 %
MONOCYTES # BLD AUTO: ABNORMAL K/UL
MONOCYTES # BLD AUTO: ABNORMAL K/UL
MONOCYTES NFR BLD: 1 %
MONOCYTES NFR BLD: 1 %
MYELOCYTES NFR BLD MANUAL: 2 %
MYELOCYTES NFR BLD MANUAL: 2 %
NEUTROPHILS NFR BLD: 85 %
NEUTROPHILS NFR BLD: 85 %
NEUTS BAND NFR BLD MANUAL: 1 %
NEUTS BAND NFR BLD MANUAL: 1 %
NRBC BLD-RTO: 0 /100 WBC
NRBC BLD-RTO: 0 /100 WBC
OVALOCYTES BLD QL SMEAR: ABNORMAL
OVALOCYTES BLD QL SMEAR: ABNORMAL
PLATELET # BLD AUTO: 350 K/UL
PLATELET # BLD AUTO: 350 K/UL
PLATELET BLD QL SMEAR: ABNORMAL
PLATELET BLD QL SMEAR: ABNORMAL
PMV BLD AUTO: 8.9 FL
PMV BLD AUTO: 8.9 FL
POIKILOCYTOSIS BLD QL SMEAR: SLIGHT
POIKILOCYTOSIS BLD QL SMEAR: SLIGHT
POTASSIUM SERPL-SCNC: 4.6 MMOL/L
POTASSIUM SERPL-SCNC: 4.6 MMOL/L
PROT SERPL-MCNC: 6.5 G/DL
PROT SERPL-MCNC: 6.5 G/DL
RBC # BLD AUTO: 4.26 M/UL
RBC # BLD AUTO: 4.26 M/UL
SODIUM SERPL-SCNC: 134 MMOL/L
SODIUM SERPL-SCNC: 134 MMOL/L
WBC # BLD AUTO: 2.88 K/UL
WBC # BLD AUTO: 2.88 K/UL

## 2018-12-06 PROCEDURE — 80053 COMPREHEN METABOLIC PANEL: CPT

## 2018-12-06 PROCEDURE — 85027 COMPLETE CBC AUTOMATED: CPT

## 2018-12-06 PROCEDURE — 87910 NFCT AGT GNTYP ALYS CMV: CPT

## 2018-12-06 PROCEDURE — 99499 UNLISTED E&M SERVICE: CPT | Mod: S$GLB,,, | Performed by: CLINICAL NURSE SPECIALIST

## 2018-12-06 PROCEDURE — 30000890 MAYO MISCELLANEOUS TEST (REFLEX)

## 2018-12-06 PROCEDURE — 85007 BL SMEAR W/DIFF WBC COUNT: CPT

## 2018-12-06 PROCEDURE — 99214 OFFICE O/P EST MOD 30 MIN: CPT | Mod: S$GLB,,, | Performed by: INTERNAL MEDICINE

## 2018-12-06 PROCEDURE — 80299 QUANTITATIVE ASSAY DRUG: CPT

## 2018-12-06 PROCEDURE — 99999 PR PBB SHADOW E&M-EST. PATIENT-LVL III: CPT | Mod: PBBFAC,,, | Performed by: INTERNAL MEDICINE

## 2018-12-06 NOTE — PROGRESS NOTES
Subjective:      Patient ID: Martin Hendrix Jr. is a 58 y.o. male.    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    Patient was noted to have elevated CMV 8,160 log 3.91 on 8/15/2018.  He was started on valganciclovir 900 mg PO BID.  Around same time, tacro level noted to be elevated to 51 in the setting of using cinnamon supplementation.  Patient reports taking his valganciclovir regularly, no missed doses.  Over the last two week, CMV viral load noted to be rising, therefore, referred to ID.    Patient reports feeling well, no complaints.  Denied any fevers, chills, sweats.  Reported intermittent episodes of abdominal pain and diarrhea occurring once a month.      Review of Systems   Constitution: Negative for chills, decreased appetite, fever, weakness, malaise/fatigue, night sweats, weight gain and weight loss.   HENT: Negative for congestion, ear pain, hearing loss, hoarse voice, sore throat and tinnitus.    Eyes: Negative for blurred vision, redness and visual disturbance.   Cardiovascular: Negative for chest pain, leg swelling and palpitations.   Respiratory: Negative for cough, hemoptysis, shortness of breath and sputum production.    Hematologic/Lymphatic: Negative for adenopathy. Does not bruise/bleed easily.   Skin: Negative for dry skin, itching, rash and suspicious lesions.   Musculoskeletal: Negative for back pain, joint pain, myalgias and neck pain.   Gastrointestinal: Positive for diarrhea. Negative for abdominal pain, constipation, heartburn, nausea and vomiting.   Genitourinary: Negative for dysuria, flank pain, frequency, hematuria, hesitancy and urgency.   Neurological: Negative for dizziness, headaches, numbness and paresthesias.    Psychiatric/Behavioral: Negative for depression and memory loss. The patient does not have insomnia and is not nervous/anxious.      Objective:   Physical Exam   Constitutional: He is oriented to person, place, and time. He appears well-developed and well-nourished. No distress.   HENT:   Head: Normocephalic and atraumatic.   Mouth/Throat: Oropharynx is clear and moist. No oropharyngeal exudate.   Eyes: Conjunctivae and EOM are normal.   Neck: Normal range of motion. Neck supple.   Cardiovascular: Normal rate, regular rhythm and normal heart sounds. Exam reveals no friction rub.   No murmur heard.  Pulmonary/Chest: Effort normal and breath sounds normal. No respiratory distress. He has no wheezes. He has no rales.   Abdominal: Soft. Bowel sounds are normal. He exhibits no distension and no mass. There is no guarding.   Musculoskeletal: Normal range of motion. He exhibits no edema.   Lymphadenopathy:     He has no cervical adenopathy.   Neurological: He is alert and oriented to person, place, and time.   Skin: Skin is warm and dry. No rash noted. He is not diaphoretic. No erythema.   Psychiatric: He has a normal mood and affect. His behavior is normal.   Vitals reviewed.    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  - CMV treatment failure with full dose valganciclovir    Suspect drug resistance given cumulative GCV exposure > 6 weeks -  will send CMV genotype.  In the meantime, will try to enroll patient in marabavir trial.     Plan:   - CBC with diff, CMP, CMV VL, CMV genotype, valganciclovir level today  - Will enroll patient in marabavir trial   - If randomized to MD trial - will switch to high dose 10 mg/kg IV BID per CMV guidelines 2018  - Will recommend decrease in immunosuppression    Discussed with Dr. Yaneth Lowry MD MPH  [facility]

## 2018-12-06 NOTE — PROGRESS NOTES
Roman Refractory or Resistant CMV Treatment Study  Protocol: -303  IRB# 2016.324.A  PI: Jaron ALY)  Site: 002    Mr. Liliana Hendrix is a 58 y.o. male with a past medical history of a Lung Transplant 22AUG2017. He has been on treatment with Valcyte for CMV infection since 07AUG2018. Initially his CMV viral load decreased, but has began increasing over last 2 weeks.     Patient may meet eligibility criteria for a current research study that is enrolling patients. Patient agreed and consented for enrollment in: A Phase 3, Multicenter, Randomized, Open-label, Active-controlled Study to Assess the Efficacy and Safety of Maribavir Treatment Compared to Investigator-assigned Treatment in Transplant Recipients with Cytomegalovirus (CMV) Infections that are Refractory or Resistant to Treatment with Ganciclovir, Valganciclovir, Foscarnet, or Cidofovir.     Consent reviewed with patient who was given time to ask question. Patient verbalized understand, consent signed and scanned into media. No research related procedures where conducted prior to consent being signed.     Plan:    1. CMV drawn today: result pending. Per protocol need second confirmatory level. Lab ordered to be draw tomorrow.   2. Once labs resulted and eligibility confirmed will contact patient to inform and set up future follow up.   3. Patient given numbers to contact ID research team if questions or concerns.

## 2018-12-07 ENCOUNTER — TELEPHONE (OUTPATIENT)
Dept: TRANSPLANT | Facility: CLINIC | Age: 58
End: 2018-12-07

## 2018-12-07 ENCOUNTER — LAB VISIT (OUTPATIENT)
Dept: LAB | Facility: HOSPITAL | Age: 58
End: 2018-12-07
Attending: INTERNAL MEDICINE
Payer: COMMERCIAL

## 2018-12-07 DIAGNOSIS — B25.9 CYTOMEGALOVIRUS INFECTION, UNSPECIFIED CYTOMEGALOVIRAL INFECTION TYPE: ICD-10-CM

## 2018-12-07 PROBLEM — A49.8 INFECTION DUE TO ENTEROBACTER AEROGENES: Status: RESOLVED | Noted: 2017-11-05 | Resolved: 2018-12-07

## 2018-12-07 PROBLEM — D72.829 LEUKOCYTOSIS: Status: RESOLVED | Noted: 2017-08-22 | Resolved: 2018-12-07

## 2018-12-07 PROCEDURE — 36415 COLL VENOUS BLD VENIPUNCTURE: CPT | Mod: PO

## 2018-12-07 NOTE — TELEPHONE ENCOUNTER
Received written order from Dr. Wolfe instructing patient to discontinue mycophenolate.  Contacted patient and instructed him to stop taking mycophenolate until further notice.  Patient verbalized understanding and did not have any further questions at this time.

## 2018-12-08 LAB
CMV DNA SERPL NAA+PROBE-ACNC: 5240 IU/ML
CMV DNA SERPL NAA+PROBE-ACNC: 5240 IU/ML

## 2018-12-09 LAB
CIDOFOVIR ISLT GENOTYP: NORMAL
FOSCARNET ISLT GENOTYP: NORMAL
GANCICLOVIR ISLT GENOTYP: NORMAL

## 2018-12-10 ENCOUNTER — TELEPHONE (OUTPATIENT)
Dept: INFECTIOUS DISEASES | Facility: HOSPITAL | Age: 58
End: 2018-12-10

## 2018-12-10 NOTE — TELEPHONE ENCOUNTER
Discussed with patient results of positive UL97 mutation  If eligible for study based on CMV VL from 12/7, patient will be randomized to either maribavir or standard of care which would be hospital admission for initiation of foscarnet.  Patient understood, all questions answered.

## 2018-12-11 ENCOUNTER — HOSPITAL ENCOUNTER (OUTPATIENT)
Dept: CARDIOLOGY | Facility: CLINIC | Age: 58
Discharge: HOME | End: 2018-12-11
Payer: COMMERCIAL

## 2018-12-11 ENCOUNTER — OFFICE VISIT (OUTPATIENT)
Dept: INFECTIOUS DISEASES | Facility: CLINIC | Age: 58
End: 2018-12-11

## 2018-12-11 VITALS
BODY MASS INDEX: 32.01 KG/M2 | HEIGHT: 71 IN | RESPIRATION RATE: 16 BRPM | HEART RATE: 102 BPM | DIASTOLIC BLOOD PRESSURE: 92 MMHG | TEMPERATURE: 98 F | SYSTOLIC BLOOD PRESSURE: 146 MMHG | WEIGHT: 228.63 LBS

## 2018-12-11 DIAGNOSIS — B25.9 CYTOMEGALOVIRUS INFECTION, UNSPECIFIED CYTOMEGALOVIRAL INFECTION TYPE: ICD-10-CM

## 2018-12-11 DIAGNOSIS — Z00.6 RESEARCH STUDY PATIENT: Primary | ICD-10-CM

## 2018-12-11 DIAGNOSIS — Z00.6 RESEARCH STUDY PATIENT: ICD-10-CM

## 2018-12-11 LAB — CMV DNA SERPL NAA+PROBE-ACNC: 6440 IU/ML

## 2018-12-11 PROCEDURE — 93010 ELECTROCARDIOGRAM REPORT: CPT | Mod: S$PBB,,, | Performed by: INTERNAL MEDICINE

## 2018-12-11 PROCEDURE — 99999 PR PBB SHADOW E&M-EST. PATIENT-LVL IV: CPT | Mod: PBBFAC,,, | Performed by: CLINICAL NURSE SPECIALIST

## 2018-12-11 PROCEDURE — 99213 OFFICE O/P EST LOW 20 MIN: CPT | Mod: S$GLB,,, | Performed by: CLINICAL NURSE SPECIALIST

## 2018-12-11 PROCEDURE — 93005 ELECTROCARDIOGRAM TRACING: CPT | Mod: PBBFAC | Performed by: INTERNAL MEDICINE

## 2018-12-11 NOTE — PROGRESS NOTES
Subjective:      Patient ID: Martin Hendrix Jr. is a 58 y.o. male.    Chief Complaint:Research      History of Present Illness  Roman Refractory or Resistant CMV Treatment Study  Protocol: -303  IRB# [id number].[id number]  PI: Jaron ALY)  Site: 002    VISIT 2/ WEEK 0:       Date of Visit: 12/12/18    Mr. Martin Hendrix is a 58 y.o. 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     Patient states he is feeling well. On labs drawn 12/6/18 blood glucose noted to be elevated. Labs were not fasting. Admits has not been taking insulin as ordered. Realizes he needs to take insulin, improve diet, and exercise more. Followed by Endocrine for management of diabetes. States take other medication as ordered.  Denies any fever, chills, n/v/d, shortness of breath, chest pain.     Patient meets eligibility criteria for a current research study that is enrolling patients. Patient agreed and consented for enrollment in: A Phase 3, Multicenter, Randomized, Open-label, Active-controlled Study to Assess the Efficacy and Safety of Maribavir Treatment Compared to Investigator-assigned Treatment in Transplant Recipients with Cytomegalovirus (CMV) Infections that are Refractory or Resistant to Treatment with Ganciclovir, Valganciclovir, Foscarnet, or Cidofovir. Returns today for screening and randomization visit.       Component      Latest Ref Rng & Units 12/6/2018 11/30/2018   Cytomegalovirus PCR, Quant      Undetected IU/mL 5240 (A) 5570 (A)     Component      Latest Ref Rng & Units 12/6/2018   Cidofovir (CDV) Resistance       Sensitive   Foscarnet (PFA) Resistance       Sensitive   Ganciclovir (GCV) Resistance       Resistant     Component      Latest Ref Rng & Units 12/6/2018   WBC      3.90 - 12.70 K/uL 2.88 (L)   Hemoglobin       14.0 - 18.0 g/dL 12.3 (L)   Platelets      150 - 350 K/uL 350   Gran%      38.0 - 73.0 % 85.0 (H)   Creatinine      0.5 - 1.4 mg/dL 1.2   Total Bilirubin      0.1 - 1.0 mg/dL 0.5   AST      10 - 40 U/L 28   ALT      10 - 44 U/L 30   eGFR if non African American      >60 mL/min/1.73 m:2 >60.0       Review of Systems   Constitution: Negative for chills, fever, weakness and malaise/fatigue.   HENT: Negative for congestion, hearing loss, hoarse voice and sore throat.    Eyes: Negative for blurred vision, redness and visual disturbance.   Cardiovascular: Negative for chest pain, leg swelling and palpitations.   Respiratory: Negative for cough, hemoptysis, shortness of breath and sputum production.    Hematologic/Lymphatic: Negative for adenopathy. Does not bruise/bleed easily.   Skin: Negative for dry skin, itching, rash and suspicious lesions.   Musculoskeletal: Negative for back pain, joint pain, myalgias and neck pain.   Gastrointestinal: Negative for abdominal pain, constipation, diarrhea, heartburn, nausea and vomiting.   Genitourinary: Negative for dysuria, flank pain, frequency, hematuria, hesitancy and urgency.   Neurological: Negative for dizziness, headaches, numbness and paresthesias.   Psychiatric/Behavioral: Negative for depression and memory loss. The patient does not have insomnia and is not nervous/anxious.      Objective:   Physical Exam   Constitutional: He is oriented to person, place, and time. Vital signs are normal. He appears well-developed and well-nourished.   HENT:   Head: Normocephalic and atraumatic.   Right Ear: External ear normal.   Left Ear: External ear normal.   Nose: Nose normal.   Eyes: Conjunctivae, EOM and lids are normal. Right eye exhibits no discharge. Left eye exhibits no discharge.   Neck: Trachea normal, normal range of motion and full passive range of motion without pain. Neck supple. No JVD present.   Cardiovascular: Normal rate and regular rhythm.   Pulmonary/Chest: Effort  normal and breath sounds normal. No respiratory distress.   Abdominal: Soft. Normal appearance. He exhibits no distension. There is no tenderness. There is no guarding.   Genitourinary: No discharge found.   Musculoskeletal: Normal range of motion. He exhibits no edema.   Neurological: He is alert and oriented to person, place, and time.   Skin: Skin is warm, dry and intact. Capillary refill takes less than 2 seconds.   Psychiatric: He has a normal mood and affect. His speech is normal and behavior is normal. Judgment and thought content normal. Cognition and memory are normal.   Vitals reviewed.    Assessment:       1. Research study patient    2. Cytomegalovirus infection, unspecified cytomegaloviral infection type        Screening procedures completed. Local labs drawn 12/6/18 used to confirm eligibility. Patient randomized to receive Maribavir 400mg po BID x 8 weeks. End of treatment: [id number]. Patient will follow up with ID research weekly for next 8 weeks.   Plan:       1. Screening labs drawn and shipped per protocol  2. EKG per protocol  3. Drug dispensed to patient and first dose taken in clinic: Maribavir 400 mg po BID x 8 weeks. Med ID: [id number], Batch [id number]  4. Study diary reviewed with patient and training complete  5. Study questionnaires as per protocol  6. RTC with labs prior on 12/18/18, call clinic with any questions/concerns.

## 2018-12-13 ENCOUNTER — PATIENT MESSAGE (OUTPATIENT)
Dept: TRANSPLANT | Facility: CLINIC | Age: 58
End: 2018-12-13

## 2018-12-13 ENCOUNTER — TELEPHONE (OUTPATIENT)
Dept: INFECTIOUS DISEASES | Facility: CLINIC | Age: 58
End: 2018-12-13

## 2018-12-13 DIAGNOSIS — Z00.6 RESEARCH STUDY PATIENT: Primary | ICD-10-CM

## 2018-12-13 DIAGNOSIS — Z94.2 LUNG REPLACED BY TRANSPLANT: ICD-10-CM

## 2018-12-13 DIAGNOSIS — Z94.2 LUNG TRANSPLANTED: ICD-10-CM

## 2018-12-13 LAB — MAYO MISCELLANEOUS RESULT (REF): NORMAL

## 2018-12-13 RX ORDER — TACROLIMUS 0.5 MG/1
1.5 CAPSULE ORAL EVERY 12 HOURS
Qty: 180 CAPSULE | Refills: 11 | Status: ON HOLD | OUTPATIENT
Start: 2018-12-13 | End: 2019-03-13 | Stop reason: SDUPTHER

## 2018-12-13 NOTE — TELEPHONE ENCOUNTER
----- Message from BETO Almeida sent at 12/13/2018  9:22 AM CST -----  I wanted to update you that per research protocol I am having Mr. Hendrix have a prograf level drawn tomorrow and next Tuesday.     I also wanted to make sure you saw that on labs he had drawn last week and the ones I drew Tuesday for the study that his blood glucose was very elevated. In the 350-400 range. He admitted to not taking his insulin as ordered, but that he would he try to do better. He also was positive for glucose and protein in his urine on the urinalysis we sent. I can send you a copy of those results is you would like them.     Thanks,  Elvia  ----- Message -----  From: Darrick Wolfe MD  Sent: 12/11/2018   1:34 PM  To: BETO Almeida, Thony Lyles, PharmD, #    Thank you Elvia. I will forward to Thony Lyles so he can make adjustments.    ----- Message -----  From: BETO Almeida  Sent: 12/11/2018   1:04 PM  To: Carla Salmeron MD, #    Mr. Hendrix was randomized today into the Maribavir arm of the study. He will be on 400mg BID for 8 weeks. We will see him weekly throughout the study.     Dr. Wolfe, I wanted to make sure you were aware that Maribavir can increase blood concentrations of tacrolimus.     Any questions related to the study please let us know.  Thank you,  Elvia  ----- Message -----  From: Yue Lowry MD  Sent: 12/9/2018  12:27 PM  To: Carla Salmeron MD, #    Patient with A594V - EC50 for ganciclovir 5-15X - If patient is randomized to MD therapy, will admit patient to start foscarnet and line placement.

## 2018-12-13 NOTE — TELEPHONE ENCOUNTER
Received written orders from Dr. Wolfe with instructions to adjust patient's tacrolimus while taking Maribivir.  Dose recommendations received verbally from Thony Lyles PharmD to decrease tacrolimus to 1.5 mg bid from 2.5/2 mg.  Dose approved by Dr. Wolfe.  Attempted call to patient but did not get an answer.  Sent patient a [facility] message instructing him to make the above dose adjustment.  Patient will have repeat tacro levels done through the study and we will keep an eye on those results.  Asked patient to respond to message to confirm that message was received and understood.     Patient returned call and dose adjustment was reviewed with patient.  Patient repeated the change and verbalized understanding of the new dose and that labs will be monitored with labs that are done through the study.

## 2018-12-13 NOTE — TELEPHONE ENCOUNTER
Roman Refractory or Resistant CMV Treatment Study  Protocol: -303  IRB# 2016.324.A  PI: Jaron ALY)  Site: 002  Patient ID: [id number]    Called and spoke to patient. Per research protocol needs Prograf level drawn. Will schedule appointment for patient to have blood drawn at [place] lab tomorrow.

## 2018-12-14 ENCOUNTER — LAB VISIT (OUTPATIENT)
Dept: LAB | Facility: HOSPITAL | Age: 58
End: 2018-12-14
Attending: INTERNAL MEDICINE
Payer: COMMERCIAL

## 2018-12-14 DIAGNOSIS — Z00.6 RESEARCH STUDY PATIENT: ICD-10-CM

## 2018-12-14 DIAGNOSIS — E83.42 HYPOMAGNESEMIA: ICD-10-CM

## 2018-12-14 DIAGNOSIS — Z94.2 LUNG REPLACED BY TRANSPLANT: ICD-10-CM

## 2018-12-14 DIAGNOSIS — Z94.2 LUNG TRANSPLANTED: ICD-10-CM

## 2018-12-14 PROCEDURE — 80197 ASSAY OF TACROLIMUS: CPT

## 2018-12-14 PROCEDURE — 36415 COLL VENOUS BLD VENIPUNCTURE: CPT | Mod: PO

## 2018-12-14 RX ORDER — LANOLIN ALCOHOL/MO/W.PET/CERES
CREAM (GRAM) TOPICAL
Qty: 60 TABLET | Refills: 0 | Status: ON HOLD | OUTPATIENT
Start: 2018-12-14 | End: 2019-02-14 | Stop reason: HOSPADM

## 2018-12-15 LAB — TACROLIMUS BLD-MCNC: 12.7 NG/ML

## 2018-12-18 ENCOUNTER — OFFICE VISIT (OUTPATIENT)
Dept: INFECTIOUS DISEASES | Facility: CLINIC | Age: 58
End: 2018-12-18

## 2018-12-18 ENCOUNTER — LAB VISIT (OUTPATIENT)
Dept: LAB | Facility: HOSPITAL | Age: 58
End: 2018-12-18
Payer: COMMERCIAL

## 2018-12-18 DIAGNOSIS — Z00.6 RESEARCH STUDY PATIENT: Primary | ICD-10-CM

## 2018-12-18 DIAGNOSIS — Z00.6 RESEARCH STUDY PATIENT: ICD-10-CM

## 2018-12-18 DIAGNOSIS — B25.9 CYTOMEGALOVIRUS INFECTION, UNSPECIFIED CYTOMEGALOVIRAL INFECTION TYPE: ICD-10-CM

## 2018-12-18 LAB
DRUG STUDY SPECIMEN TYPE: NORMAL
DRUG STUDY TEST NAME: NORMAL
DRUG STUDY TEST RESULT: NORMAL
TACROLIMUS BLD-MCNC: 10.5 NG/ML

## 2018-12-18 PROCEDURE — 99499 UNLISTED E&M SERVICE: CPT | Mod: S$GLB,,, | Performed by: CLINICAL NURSE SPECIALIST

## 2018-12-18 PROCEDURE — 99999 PR PBB SHADOW E&M-EST. PATIENT-LVL IV: CPT | Mod: PBBFAC,,, | Performed by: CLINICAL NURSE SPECIALIST

## 2018-12-18 PROCEDURE — 36415 COLL VENOUS BLD VENIPUNCTURE: CPT

## 2018-12-18 PROCEDURE — 80197 ASSAY OF TACROLIMUS: CPT

## 2018-12-18 PROCEDURE — 99000 SPECIMEN HANDLING OFFICE-LAB: CPT

## 2018-12-18 RX ORDER — METFORMIN HYDROCHLORIDE 500 MG/1
TABLET, EXTENDED RELEASE ORAL
Qty: 120 TABLET | Refills: 4 | Status: ON HOLD | OUTPATIENT
Start: 2018-12-18 | End: 2019-03-13 | Stop reason: HOSPADM

## 2018-12-18 NOTE — PROGRESS NOTES
Roman Refractory or Resistant CMV Treatment Study  Protocol: -303  IRB# 2016.324.A  PI: Jaron ALY)  Site: 002     VISIT 3/ WEEK 1:       Date of Visit: 12/18/18     Mr. Martin Hendrix presents for to clinic for Visit 3/Week 1 for Roman CMV treatment study. No Adverse events assessed, no episodes of rejection, no CMV invasive disease or CMV syndrome assessed. Pill count correct: actual 10, expected 10. No new problems or complaints. Tolerating study drug well. Denies fever, chills.       Plan:  1. Study labs and CMV viral load local lab drawn and processed per protocol  2. Drug dispensed, 10 day supply, to patient: Maribavir 400 mg po BID, Med ID: [id number], Batch [id number]  3. Reviewed with patient importance of completing medication diary twice a day. Reminded to bring diary and medication to next appointment  4. RTC with labs prior on 12/24/18, call clinic with any questions/concerns.

## 2018-12-20 LAB — CMV DNA SERPL NAA+PROBE-ACNC: 1800 IU/ML

## 2018-12-21 DIAGNOSIS — B25.9 CYTOMEGALOVIRUS INFECTION, UNSPECIFIED CYTOMEGALOVIRAL INFECTION TYPE: Primary | ICD-10-CM

## 2018-12-21 DIAGNOSIS — Z94.2 LUNG REPLACED BY TRANSPLANT: ICD-10-CM

## 2018-12-21 LAB
FUNGUS SPEC CULT: NORMAL
FUNGUS SPEC CULT: NORMAL

## 2018-12-24 ENCOUNTER — OFFICE VISIT (OUTPATIENT)
Dept: INFECTIOUS DISEASES | Facility: CLINIC | Age: 58
End: 2018-12-24

## 2018-12-24 ENCOUNTER — LAB VISIT (OUTPATIENT)
Dept: LAB | Facility: HOSPITAL | Age: 58
End: 2018-12-24
Payer: COMMERCIAL

## 2018-12-24 DIAGNOSIS — B25.9 CYTOMEGALOVIRUS INFECTION, UNSPECIFIED CYTOMEGALOVIRAL INFECTION TYPE: ICD-10-CM

## 2018-12-24 DIAGNOSIS — Z94.2 LUNG REPLACED BY TRANSPLANT: ICD-10-CM

## 2018-12-24 DIAGNOSIS — Z00.6 RESEARCH STUDY PATIENT: ICD-10-CM

## 2018-12-24 DIAGNOSIS — Z00.6 RESEARCH STUDY PATIENT: Primary | ICD-10-CM

## 2018-12-24 LAB
ALBUMIN SERPL BCP-MCNC: 3.7 G/DL
ALP SERPL-CCNC: 97 U/L
ALT SERPL W/O P-5'-P-CCNC: 29 U/L
ANION GAP SERPL CALC-SCNC: 9 MMOL/L
AST SERPL-CCNC: 26 U/L
BILIRUB SERPL-MCNC: 0.5 MG/DL
BUN SERPL-MCNC: 18 MG/DL
CALCIUM SERPL-MCNC: 9.8 MG/DL
CHLORIDE SERPL-SCNC: 103 MMOL/L
CO2 SERPL-SCNC: 29 MMOL/L
CREAT SERPL-MCNC: 1 MG/DL
EST. GFR  (AFRICAN AMERICAN): >60 ML/MIN/1.73 M^2
EST. GFR  (NON AFRICAN AMERICAN): >60 ML/MIN/1.73 M^2
GLUCOSE SERPL-MCNC: 314 MG/DL
POTASSIUM SERPL-SCNC: 3.8 MMOL/L
PROT SERPL-MCNC: 7 G/DL
SODIUM SERPL-SCNC: 141 MMOL/L

## 2018-12-24 PROCEDURE — 36415 COLL VENOUS BLD VENIPUNCTURE: CPT

## 2018-12-24 PROCEDURE — 99999 PR PBB SHADOW E&M-EST. PATIENT-LVL II: CPT | Mod: PBBFAC,,, | Performed by: CLINICAL NURSE SPECIALIST

## 2018-12-24 PROCEDURE — 99499 UNLISTED E&M SERVICE: CPT | Mod: S$GLB,,, | Performed by: CLINICAL NURSE SPECIALIST

## 2018-12-24 PROCEDURE — 80053 COMPREHEN METABOLIC PANEL: CPT

## 2018-12-24 NOTE — PROGRESS NOTES
Roman Refractory or Resistant CMV Treatment Study  Protocol: -303  IRB# 2016.324.A  PI: Jaron ALY)  Site: 002     VISIT 4/ WEEK 2:       Date of Visit: 12/24/18     Mr. Martin Hendrix presents for to clinic for Visit 4/Week 2 for Roman CMV treatment study. No Adverse events assessed, no episodes of rejection, no CMV invasive disease or CMV syndrome assessed. Pill count correct: actual 16, expected 16. No new problems or complaints. Tolerating study drug well. Denies fever, chills.         Plan:  1. Study labs drawn and processed per protocol  2. Drug dispensed, 10 day supply, to patient: Maribavir 400 mg po BID, Med ID: [id number], Batch [id number]  3. Reviewed with patient importance of completing medication diary twice a day. Reminded to bring diary and medication to next appointment  4. RTC with labs prior on 01/03/2019, call clinic with any questions/concerns.

## 2018-12-26 LAB — CMV DNA SERPL NAA+PROBE-ACNC: 1040 IU/ML

## 2019-01-03 ENCOUNTER — OFFICE VISIT (OUTPATIENT)
Dept: INFECTIOUS DISEASES | Facility: CLINIC | Age: 59
End: 2019-01-03

## 2019-01-03 ENCOUNTER — LAB VISIT (OUTPATIENT)
Dept: LAB | Facility: HOSPITAL | Age: 59
End: 2019-01-03
Payer: COMMERCIAL

## 2019-01-03 DIAGNOSIS — I10 ESSENTIAL HYPERTENSION: ICD-10-CM

## 2019-01-03 DIAGNOSIS — B25.9 CYTOMEGALOVIRUS INFECTION, UNSPECIFIED CYTOMEGALOVIRAL INFECTION TYPE: ICD-10-CM

## 2019-01-03 DIAGNOSIS — Z00.6 RESEARCH STUDY PATIENT: ICD-10-CM

## 2019-01-03 DIAGNOSIS — Z00.6 RESEARCH SUBJECT: Primary | ICD-10-CM

## 2019-01-03 DIAGNOSIS — Z79.4 TYPE 2 DIABETES MELLITUS WITH HYPERGLYCEMIA, WITH LONG-TERM CURRENT USE OF INSULIN: Chronic | ICD-10-CM

## 2019-01-03 DIAGNOSIS — E11.65 TYPE 2 DIABETES MELLITUS WITH HYPERGLYCEMIA, WITH LONG-TERM CURRENT USE OF INSULIN: Chronic | ICD-10-CM

## 2019-01-03 PROCEDURE — 99999 PR PBB SHADOW E&M-EST. PATIENT-LVL I: CPT | Mod: PBBFAC,,, | Performed by: CLINICAL NURSE SPECIALIST

## 2019-01-03 PROCEDURE — 99999 PR PBB SHADOW E&M-EST. PATIENT-LVL I: ICD-10-PCS | Mod: PBBFAC,,, | Performed by: CLINICAL NURSE SPECIALIST

## 2019-01-03 PROCEDURE — 36415 COLL VENOUS BLD VENIPUNCTURE: CPT

## 2019-01-03 PROCEDURE — 99499 NO LOS: ICD-10-PCS | Mod: S$GLB,,, | Performed by: CLINICAL NURSE SPECIALIST

## 2019-01-03 PROCEDURE — 99499 UNLISTED E&M SERVICE: CPT | Mod: S$GLB,,, | Performed by: CLINICAL NURSE SPECIALIST

## 2019-01-03 RX ORDER — AMLODIPINE BESYLATE 5 MG/1
5 TABLET ORAL DAILY
Qty: 90 TABLET | Refills: 3 | Status: SHIPPED | OUTPATIENT
Start: 2019-01-03 | End: 2020-02-21

## 2019-01-03 NOTE — TELEPHONE ENCOUNTER
----- Message from BETO Almeida sent at 1/3/2019  8:25 AM CST -----  Danielle,    I saw Mr. Hendrix for the CMV study this morning. He is out of his Amlodipine. He says the [facility] order company he uses hasn't sent it. He wanted to know if you could [facility] at [address] in [place], [place]?    Thanks,  Elvia

## 2019-01-03 NOTE — PROGRESS NOTES
Roman Refractory or Resistant CMV Treatment Study  Protocol: -303  IRB# [id number].[id number]  PI: Jaron ALY)  Site: 002     VISIT 5/ WEEK 3:       Date of Visit: 01/03/2019     Mr. Martin Hendrix presents to clinic for Visit 5/Week 3 for Roman CMV treatment study. Patient receiving Maribavir 400mg po BID as per study protocol for 8 weeks. Began treatment on 12/11/18, EOT 2/5/19. No Adverse events assessed, no episodes of rejection, no CMV invasive disease or CMV syndrome assessed. Pill count: actual 2, expected 0. No missed diary entries, patient does not recalled any missed doses. Taking 2 tablets twice a day. No new problems or complaints. Tolerating study drug well. Denies fever, chills.         Component      Latest Ref Rng & Units 12/24/2018 12/18/2018 12/7/2018 12/6/2018 11/30/2018                 Cytomegalovirus PCR, Quant      Undetected IU/mL 1040 (A) 1800 (A) 6440 (A) 5240 (A) 5570 (A)       Plan:  1. Study labs drawn and processed per protocol  2. Drug dispensed, 10 day supply, to patient: Maribavir 400 mg po BID, Med ID: [id number], Batch [id number]  3. Reviewed with patient importance of completing medication diary twice a day. Reminded to bring diary and medication to next appointment  4. RTC with labs prior on 01/10/2019, call clinic with any questions/concerns.

## 2019-01-04 LAB — CMV DNA SERPL NAA+PROBE-ACNC: 262 IU/ML

## 2019-01-10 ENCOUNTER — OFFICE VISIT (OUTPATIENT)
Dept: INFECTIOUS DISEASES | Facility: CLINIC | Age: 59
End: 2019-01-10

## 2019-01-10 ENCOUNTER — LAB VISIT (OUTPATIENT)
Dept: LAB | Facility: HOSPITAL | Age: 59
End: 2019-01-10
Payer: COMMERCIAL

## 2019-01-10 DIAGNOSIS — Z00.6 RESEARCH STUDY PATIENT: ICD-10-CM

## 2019-01-10 DIAGNOSIS — B25.9 CYTOMEGALOVIRUS INFECTION, UNSPECIFIED CYTOMEGALOVIRAL INFECTION TYPE: ICD-10-CM

## 2019-01-10 DIAGNOSIS — Z94.2 LUNG REPLACED BY TRANSPLANT: ICD-10-CM

## 2019-01-10 DIAGNOSIS — Z00.6 RESEARCH STUDY PATIENT: Primary | ICD-10-CM

## 2019-01-10 PROCEDURE — 99213 PR OFFICE/OUTPT VISIT, EST, LEVL III, 20-29 MIN: ICD-10-PCS | Mod: S$GLB,,, | Performed by: CLINICAL NURSE SPECIALIST

## 2019-01-10 PROCEDURE — 99999 PR PBB SHADOW E&M-EST. PATIENT-LVL III: ICD-10-PCS | Mod: PBBFAC,,, | Performed by: CLINICAL NURSE SPECIALIST

## 2019-01-10 PROCEDURE — 99999 PR PBB SHADOW E&M-EST. PATIENT-LVL III: CPT | Mod: PBBFAC,,, | Performed by: CLINICAL NURSE SPECIALIST

## 2019-01-10 PROCEDURE — 36415 COLL VENOUS BLD VENIPUNCTURE: CPT

## 2019-01-10 PROCEDURE — 99213 OFFICE O/P EST LOW 20 MIN: CPT | Mod: S$GLB,,, | Performed by: CLINICAL NURSE SPECIALIST

## 2019-01-10 NOTE — PROGRESS NOTES
Roman Refractory or Resistant CMV Treatment Study  Protocol: -303  IRB# [id number].[id number]  PI: Jaron ALY)  Site: 002     VISIT 6 WEEK 4:       Date of Visit: 01/10/2019     Visit completed. See encounter note for visit details.

## 2019-01-10 NOTE — PROGRESS NOTES
Subjective:      Patient ID: Martin Hendrix Jr. is a 58 y.o. male.    Chief Complaint:Research      History of Present Illness  Roman Refractory or Resistant CMV Treatment Study  Protocol: -303  IRB# 2016.324.A  PI: Jaron ALY)  Site: 002     VISIT 6 WEEK 4:       Date of Visit: 01/10/2019     Mr. Martin Hendrix presents to clinic for Visit 6/Week 4 for Roman CMV treatment study. Patient receiving Maribavir 400mg po BID as per study protocol for 8 weeks. Began treatment on 12/11/18, EOT 2/5/19. No Adverse events assessed, no episodes of rejection, no CMV invasive disease or CMV syndrome assessed. Pill count: actual 12, expected 12. No new problems or complaints. Tolerating study drug well. Denies fever, chills     Component  Latest Ref Rng & Units 1/3/2019 12/24/2018 12/18/2018 12/7/2018 12/6/2018 11/30/2018   Cytomegalovirus PCR, Quant      Undetected IU/mL 262 (A) 1040 (A) 1800 (A) 6440 (A) 5240 (A) 5570 (A)       Review of Systems   Constitution: Negative for chills, decreased appetite, fever, weakness, malaise/fatigue, night sweats, weight gain and weight loss.   HENT: Negative for congestion, ear pain, hearing loss, hoarse voice, sore throat and tinnitus.    Eyes: Negative for blurred vision, redness and visual disturbance.   Cardiovascular: Negative for chest pain, leg swelling and palpitations.   Respiratory: Negative for cough, hemoptysis, shortness of breath and sputum production.    Hematologic/Lymphatic: Negative for adenopathy. Does not bruise/bleed easily.   Skin: Negative for dry skin, itching, rash and suspicious lesions.   Musculoskeletal: Negative for back pain, joint pain, myalgias and neck pain.   Gastrointestinal: Negative for abdominal pain, constipation, diarrhea, heartburn, nausea and vomiting.   Genitourinary: Negative for dysuria, flank pain, frequency, hematuria, hesitancy and urgency.   Neurological: Negative for dizziness, headaches, numbness and paresthesias.    Psychiatric/Behavioral: Negative for depression and memory loss. The patient does not have insomnia and is not nervous/anxious.      Objective:   Physical Exam   Constitutional: He is oriented to person, place, and time. Vital signs are normal. He appears well-developed and well-nourished.   HENT:   Head: Normocephalic and atraumatic.   Right Ear: External ear normal.   Left Ear: External ear normal.   Nose: Nose normal.   Eyes: Conjunctivae, EOM and lids are normal. Right eye exhibits no discharge. Left eye exhibits no discharge.   Neck: Trachea normal, normal range of motion and full passive range of motion without pain. Neck supple. No JVD present.   Cardiovascular: Normal rate and regular rhythm.   Pulmonary/Chest: Effort normal and breath sounds normal. No respiratory distress.   Abdominal: Soft. Normal appearance and bowel sounds are normal. He exhibits no distension. There is no tenderness. There is no guarding.   Musculoskeletal: Normal range of motion.   Neurological: He is alert and oriented to person, place, and time.   Skin: Skin is warm, dry and intact. Capillary refill takes less than 2 seconds.   Psychiatric: He has a normal mood and affect. His speech is normal and behavior is normal. Judgment and thought content normal. Cognition and memory are normal.   Vitals reviewed.    Assessment:       1. Research study patient    2. Cytomegalovirus infection, unspecified cytomegaloviral infection type    3. Lung replaced by transplant          Plan:       Plan:  1. Study labs drawn and processed per protocol  2. Study procedures and questionnaires completed as per protocol  3. Drug dispensed, 10 day supply, to patient: Maribavir 400 mg po BID, Med ID: [id number], Batch [id number]  4. Reviewed with patient importance of completing medication diary twice a day. Reminded to bring diary and medication to next appointment  5. Standard of care labs ordered to be drawn with study labs on 1/18/19  6. RTC with labs prior  on 01/18/2019, call clinic with any questions/concerns.

## 2019-01-11 RX ORDER — PREDNISONE 10 MG/1
TABLET ORAL
Qty: 980 TABLET | Refills: 0 | Status: ON HOLD | OUTPATIENT
Start: 2019-01-11 | End: 2019-02-14 | Stop reason: HOSPADM

## 2019-01-12 LAB — CMV DNA SERPL NAA+PROBE-ACNC: <35 IU/ML

## 2019-01-18 ENCOUNTER — LAB VISIT (OUTPATIENT)
Dept: LAB | Facility: HOSPITAL | Age: 59
End: 2019-01-18
Payer: COMMERCIAL

## 2019-01-18 ENCOUNTER — OFFICE VISIT (OUTPATIENT)
Dept: INFECTIOUS DISEASES | Facility: CLINIC | Age: 59
End: 2019-01-18

## 2019-01-18 DIAGNOSIS — B25.9 CYTOMEGALOVIRUS INFECTION, UNSPECIFIED CYTOMEGALOVIRAL INFECTION TYPE: ICD-10-CM

## 2019-01-18 DIAGNOSIS — Z00.6 RESEARCH STUDY PATIENT: Primary | ICD-10-CM

## 2019-01-18 DIAGNOSIS — Z00.6 RESEARCH STUDY PATIENT: ICD-10-CM

## 2019-01-18 DIAGNOSIS — Z94.2 LUNG REPLACED BY TRANSPLANT: ICD-10-CM

## 2019-01-18 LAB
ALBUMIN SERPL BCP-MCNC: 3.7 G/DL
ALP SERPL-CCNC: 87 U/L
ALT SERPL W/O P-5'-P-CCNC: 30 U/L
ANION GAP SERPL CALC-SCNC: 8 MMOL/L
AST SERPL-CCNC: 22 U/L
BASOPHILS # BLD AUTO: 0.19 K/UL
BASOPHILS NFR BLD: 2 %
BILIRUB SERPL-MCNC: 0.6 MG/DL
BUN SERPL-MCNC: 16 MG/DL
CALCIUM SERPL-MCNC: 9.7 MG/DL
CHLORIDE SERPL-SCNC: 102 MMOL/L
CO2 SERPL-SCNC: 29 MMOL/L
CREAT SERPL-MCNC: 1 MG/DL
DIFFERENTIAL METHOD: ABNORMAL
DRUG STUDY SPECIMEN TYPE: NORMAL
DRUG STUDY TEST NAME: NORMAL
DRUG STUDY TEST RESULT: NORMAL
EOSINOPHIL # BLD AUTO: 0.7 K/UL
EOSINOPHIL NFR BLD: 7.2 %
ERYTHROCYTE [DISTWIDTH] IN BLOOD BY AUTOMATED COUNT: 13.5 %
EST. GFR  (AFRICAN AMERICAN): >60 ML/MIN/1.73 M^2
EST. GFR  (NON AFRICAN AMERICAN): >60 ML/MIN/1.73 M^2
GLUCOSE SERPL-MCNC: 270 MG/DL
HCT VFR BLD AUTO: 38.9 %
HGB BLD-MCNC: 12.5 G/DL
IMM GRANULOCYTES # BLD AUTO: 0.16 K/UL
IMM GRANULOCYTES NFR BLD AUTO: 1.7 %
LYMPHOCYTES # BLD AUTO: 0.9 K/UL
LYMPHOCYTES NFR BLD: 9.3 %
MAGNESIUM SERPL-MCNC: 1.3 MG/DL
MCH RBC QN AUTO: 29.2 PG
MCHC RBC AUTO-ENTMCNC: 32.1 G/DL
MCV RBC AUTO: 91 FL
MONOCYTES # BLD AUTO: 1.2 K/UL
MONOCYTES NFR BLD: 12.6 %
NEUTROPHILS # BLD AUTO: 6.4 K/UL
NEUTROPHILS NFR BLD: 67.2 %
NRBC BLD-RTO: 0 /100 WBC
PLATELET # BLD AUTO: 209 K/UL
PMV BLD AUTO: 9.4 FL
POTASSIUM SERPL-SCNC: 4.3 MMOL/L
PROT SERPL-MCNC: 7.2 G/DL
RBC # BLD AUTO: 4.28 M/UL
SODIUM SERPL-SCNC: 139 MMOL/L
TACROLIMUS BLD-MCNC: 11.3 NG/ML
WBC # BLD AUTO: 9.56 K/UL

## 2019-01-18 PROCEDURE — 99000 SPECIMEN HANDLING OFFICE-LAB: CPT

## 2019-01-18 PROCEDURE — 85025 COMPLETE CBC W/AUTO DIFF WBC: CPT

## 2019-01-18 PROCEDURE — 99499 UNLISTED E&M SERVICE: CPT | Mod: S$GLB,,, | Performed by: CLINICAL NURSE SPECIALIST

## 2019-01-18 PROCEDURE — 99499 NO LOS: ICD-10-PCS | Mod: S$GLB,,, | Performed by: CLINICAL NURSE SPECIALIST

## 2019-01-18 PROCEDURE — 99999 PR PBB SHADOW E&M-EST. PATIENT-LVL III: ICD-10-PCS | Mod: PBBFAC,,, | Performed by: CLINICAL NURSE SPECIALIST

## 2019-01-18 PROCEDURE — 80197 ASSAY OF TACROLIMUS: CPT

## 2019-01-18 PROCEDURE — 83735 ASSAY OF MAGNESIUM: CPT

## 2019-01-18 PROCEDURE — 80053 COMPREHEN METABOLIC PANEL: CPT

## 2019-01-18 PROCEDURE — 99999 PR PBB SHADOW E&M-EST. PATIENT-LVL III: CPT | Mod: PBBFAC,,, | Performed by: CLINICAL NURSE SPECIALIST

## 2019-01-18 PROCEDURE — 36415 COLL VENOUS BLD VENIPUNCTURE: CPT

## 2019-01-18 NOTE — PROGRESS NOTES
Roman Refractory or Resistant CMV Treatment Study  Protocol: -303  IRB# [id number].[id number]  PI: Jaron ALY)  Site: 002     VISIT 7/ WEEK 5:       Date of Visit: 01/18/2019     Mr. Martni Hendrix presents to clinic for Visit 7/Week 5 for Roman CMV treatment study. Patient receiving Maribavir 400mg po BID as per study protocol for 8 weeks. Began treatment on 12/11/18, EOT 2/5/19. No Adverse events assessed, no episodes of rejection, no CMV invasive disease or CMV syndrome assessed. Pill count: actual 8, expected 8. No new problems or complaints. Tolerating study drug well. Denies fever, chills.     Plan:  1. Study labs drawn and processed per protocol  2. Drug dispensed, 10 day supply, to patient: Maribavir 400 mg po BID, Med ID: [id number], Batch [id number]  3. Reviewed with patient importance of completing medication diary twice a day. Reminded to bring diary and medication to next appointment  4. RTC with labs prior on 01/25/2019, call clinic with any questions/concerns.

## 2019-01-21 LAB — CMV DNA SERPL NAA+PROBE-ACNC: <35 IU/ML

## 2019-01-25 ENCOUNTER — OFFICE VISIT (OUTPATIENT)
Dept: INFECTIOUS DISEASES | Facility: CLINIC | Age: 59
End: 2019-01-25

## 2019-01-25 ENCOUNTER — LAB VISIT (OUTPATIENT)
Dept: LAB | Facility: HOSPITAL | Age: 59
End: 2019-01-25
Payer: COMMERCIAL

## 2019-01-25 VITALS
BODY MASS INDEX: 32.13 KG/M2 | SYSTOLIC BLOOD PRESSURE: 148 MMHG | HEART RATE: 98 BPM | RESPIRATION RATE: 16 BRPM | TEMPERATURE: 99 F | WEIGHT: 230.38 LBS | DIASTOLIC BLOOD PRESSURE: 91 MMHG

## 2019-01-25 DIAGNOSIS — B25.9 CYTOMEGALOVIRUS INFECTION, UNSPECIFIED CYTOMEGALOVIRAL INFECTION TYPE: ICD-10-CM

## 2019-01-25 DIAGNOSIS — Z00.6 RESEARCH STUDY PATIENT: ICD-10-CM

## 2019-01-25 DIAGNOSIS — Z00.6 RESEARCH STUDY PATIENT: Primary | ICD-10-CM

## 2019-01-25 LAB
DRUG STUDY SPECIMEN TYPE: NORMAL
DRUG STUDY TEST NAME: NORMAL
DRUG STUDY TEST RESULT: NORMAL

## 2019-01-25 PROCEDURE — 36415 COLL VENOUS BLD VENIPUNCTURE: CPT

## 2019-01-25 PROCEDURE — 99499 NO LOS: ICD-10-PCS | Mod: S$GLB,,, | Performed by: CLINICAL NURSE SPECIALIST

## 2019-01-25 PROCEDURE — 99999 PR PBB SHADOW E&M-EST. PATIENT-LVL III: ICD-10-PCS | Mod: PBBFAC,,, | Performed by: CLINICAL NURSE SPECIALIST

## 2019-01-25 PROCEDURE — 99999 PR PBB SHADOW E&M-EST. PATIENT-LVL III: CPT | Mod: PBBFAC,,, | Performed by: CLINICAL NURSE SPECIALIST

## 2019-01-25 PROCEDURE — 99000 SPECIMEN HANDLING OFFICE-LAB: CPT

## 2019-01-25 PROCEDURE — 99499 UNLISTED E&M SERVICE: CPT | Mod: S$GLB,,, | Performed by: CLINICAL NURSE SPECIALIST

## 2019-01-25 NOTE — PROGRESS NOTES
Roman Refractory or Resistant CMV Treatment Study  Protocol: -303  IRB# [id number].[id number]  PI: Jaron ALY)  Site: 002     VISIT 8/ WEEK 6:       Date of Visit: 01/25/2019     Mr. Martin Hendrix presents to clinic for Visit 8/Week 6 for Roman CMV treatment study. Patient receiving Maribavir 400mg po BID as per study protocol for 8 weeks. Began treatment on 12/11/18, EOT 2/5/19. No Adverse events assessed, no episodes of rejection, no CMV invasive disease or CMV syndrome assessed. Pill count: 14 remaining in bottle. No new problems or complaints. Tolerating study drug well. Denies fever, chills.      Plan:  1. Study labs drawn and processed per protocol  2. Drug dispensed, 10 day supply, to patient: Maribavir 400 mg po BID, Med ID: [id number], Batch [id number]  3. Reviewed with patient importance of completing medication diary twice a day. Reminded to bring diary and medication to next appointment  4. RTC with labs prior on 02/01/2019, call clinic with any questions/concerns.

## 2019-01-28 LAB — CMV DNA SERPL NAA+PROBE-ACNC: 55 IU/ML

## 2019-02-01 ENCOUNTER — OFFICE VISIT (OUTPATIENT)
Dept: INFECTIOUS DISEASES | Facility: CLINIC | Age: 59
End: 2019-02-01

## 2019-02-01 ENCOUNTER — PATIENT MESSAGE (OUTPATIENT)
Dept: ENDOCRINOLOGY | Facility: CLINIC | Age: 59
End: 2019-02-01

## 2019-02-01 ENCOUNTER — LAB VISIT (OUTPATIENT)
Dept: LAB | Facility: HOSPITAL | Age: 59
End: 2019-02-01
Payer: COMMERCIAL

## 2019-02-01 DIAGNOSIS — Z94.2 LUNG REPLACED BY TRANSPLANT: ICD-10-CM

## 2019-02-01 DIAGNOSIS — B25.9 CYTOMEGALOVIRUS INFECTION, UNSPECIFIED CYTOMEGALOVIRAL INFECTION TYPE: ICD-10-CM

## 2019-02-01 DIAGNOSIS — Z00.6 RESEARCH STUDY PATIENT: ICD-10-CM

## 2019-02-01 DIAGNOSIS — Z94.2 LUNG TRANSPLANTED: Primary | ICD-10-CM

## 2019-02-01 PROCEDURE — 99999 PR PBB SHADOW E&M-EST. PATIENT-LVL III: ICD-10-PCS | Mod: PBBFAC,,, | Performed by: CLINICAL NURSE SPECIALIST

## 2019-02-01 PROCEDURE — 99499 NO LOS: ICD-10-PCS | Mod: S$GLB,,, | Performed by: CLINICAL NURSE SPECIALIST

## 2019-02-01 PROCEDURE — 99499 UNLISTED E&M SERVICE: CPT | Mod: S$GLB,,, | Performed by: CLINICAL NURSE SPECIALIST

## 2019-02-01 PROCEDURE — 99999 PR PBB SHADOW E&M-EST. PATIENT-LVL III: CPT | Mod: PBBFAC,,, | Performed by: CLINICAL NURSE SPECIALIST

## 2019-02-01 PROCEDURE — 36415 COLL VENOUS BLD VENIPUNCTURE: CPT

## 2019-02-01 NOTE — PROGRESS NOTES
Roman Refractory or Resistant CMV Treatment Study  Protocol: -303  IRB# [id number].[id number]  PI: Jaron ALY)  Site: 002     VISIT 9/ WEEK 7:       Date of Visit: 02/01/2019     Mr. Martin Hendrix presents to clinic for Visit 9/Week 7 for Roman CMV treatment study. Patient receiving Maribavir 400mg po BID as per study protocol for 8 weeks. Began treatment on 12/11/18, EOT 2/5/19. No Adverse events assessed, no episodes of rejection, no CMV invasive disease or CMV syndrome assessed. Pill count: 12 remaining in bottle. No new problems or complaints. Tolerating study drug well. Denies fever, chills.      Plan:  1. Study labs drawn and processed per protocol  2. Drug dispensed, 10 day supply, to patient: Maribavir 400 mg po BID, Med ID: [id number], Batch [id number]  3. Reviewed with patient importance of completing medication diary twice a day. Reminded to bring diary and medication to next appointment  4. RTC with labs prior on 02/06/2019. This will be EOT visit. Reminded patient will be drawing Prograf level, and pre and 2 hour post PK labs for Maribavir. Patient to not taking any morning meds until after blood drawn. Call clinic with any questions/concerns.

## 2019-02-04 LAB — CMV DNA SERPL NAA+PROBE-ACNC: 538 IU/ML

## 2019-02-04 RX ORDER — IBUPROFEN 200 MG
CAPSULE ORAL
Qty: 60 TABLET | Refills: 0 | Status: SHIPPED | OUTPATIENT
Start: 2019-02-04 | End: 2019-04-05 | Stop reason: SDUPTHER

## 2019-02-06 ENCOUNTER — HOSPITAL ENCOUNTER (OUTPATIENT)
Dept: CARDIOLOGY | Facility: CLINIC | Age: 59
Discharge: HOME | End: 2019-02-06
Payer: COMMERCIAL

## 2019-02-06 ENCOUNTER — OFFICE VISIT (OUTPATIENT)
Dept: INFECTIOUS DISEASES | Facility: CLINIC | Age: 59
End: 2019-02-06

## 2019-02-06 VITALS
RESPIRATION RATE: 16 BRPM | HEART RATE: 97 BPM | DIASTOLIC BLOOD PRESSURE: 89 MMHG | TEMPERATURE: 98 F | BODY MASS INDEX: 31.36 KG/M2 | SYSTOLIC BLOOD PRESSURE: 142 MMHG | WEIGHT: 224.88 LBS

## 2019-02-06 DIAGNOSIS — Z00.6 RESEARCH STUDY PATIENT: ICD-10-CM

## 2019-02-06 DIAGNOSIS — B25.9 CYTOMEGALOVIRUS INFECTION, UNSPECIFIED CYTOMEGALOVIRAL INFECTION TYPE: ICD-10-CM

## 2019-02-06 DIAGNOSIS — B25.9 CYTOMEGALOVIRUS INFECTION, UNSPECIFIED CYTOMEGALOVIRAL INFECTION TYPE: Primary | ICD-10-CM

## 2019-02-06 DIAGNOSIS — Z94.2 LUNG TRANSPLANTED: ICD-10-CM

## 2019-02-06 PROCEDURE — 99213 PR OFFICE/OUTPT VISIT, EST, LEVL III, 20-29 MIN: ICD-10-PCS | Mod: S$GLB,,, | Performed by: CLINICAL NURSE SPECIALIST

## 2019-02-06 PROCEDURE — 93010 EKG 12-LEAD: ICD-10-PCS | Mod: S$PBB,,, | Performed by: INTERNAL MEDICINE

## 2019-02-06 PROCEDURE — 99213 OFFICE O/P EST LOW 20 MIN: CPT | Mod: S$GLB,,, | Performed by: CLINICAL NURSE SPECIALIST

## 2019-02-06 PROCEDURE — 99999 PR PBB SHADOW E&M-EST. PATIENT-LVL V: CPT | Mod: PBBFAC,,, | Performed by: CLINICAL NURSE SPECIALIST

## 2019-02-06 PROCEDURE — 93005 ELECTROCARDIOGRAM TRACING: CPT | Mod: PBBFAC | Performed by: INTERNAL MEDICINE

## 2019-02-06 PROCEDURE — 99999 PR PBB SHADOW E&M-EST. PATIENT-LVL V: ICD-10-PCS | Mod: PBBFAC,,, | Performed by: CLINICAL NURSE SPECIALIST

## 2019-02-06 PROCEDURE — 93010 ELECTROCARDIOGRAM REPORT: CPT | Mod: S$PBB,,, | Performed by: INTERNAL MEDICINE

## 2019-02-06 NOTE — Clinical Note
,CMV viral load from last week rising. Completed study drug today. CMV drawn today. Discussed with Dr. Salmeron and patient has completed study treatment. Will refer back to you for treatment if CMV viremia returns. Thanks,Elvia

## 2019-02-06 NOTE — PROGRESS NOTES
Subjective:      Patient ID: Martin Hendrix Jr. is a 58 y.o. male.    Chief Complaint:Research      History of Present Illness  Roman Refractory or Resistant CMV Treatment Study  Protocol: -303  IRB# [id number].[id number]  PI: Jaron ALY)  Site: 002     VISIT 10/ WEEK 8:       Date of Visit: 02/06/2019    Mr. Martin Hendrix is a 58 y.o. male with a past medical history sarcoidosis, pulmonary hypertension, and Type II diabetes, s/p bilateral sequential lung transplant (D+/R-) on 22AUG2017. He began treatment with Valcyte for CMV infection on 07AUG2018. Initially his CMV viral load decreased, but began increasing in November 2018. He was found to have a positive UL97 mutation. He was consented and enrolled in Roman CMV treatment study and started on investigational drug on 12DEC2018.      Mr. Hendrix presents to clinic today for Visit 10/Week 6 for Roman CMV treatment study. This is the end of treatment visit. Patient has received Maribavir 400mg po BID as per study protocol for 8 weeks. Treatment will end today 06FEB2019. No Adverse events assessed, no episodes of rejection, no CMV invasive disease or CMV syndrome assessed. Pill count: 20 remaining in bottle. No new problems or complaints.  Denies fever, chills.     Discussed with patient that CMV viral load had decreased to <35 after 5 weeks of treatment, but was elevated last week. As patient has completed 8 weeks of study drug is he done with study drug treatment and now in the follow up phase.     Component      Latest Ref Rng & Units 2/1/2019 1/25/2019 1/18/2019 1/10/2019 1/3/2019   Cytomegalovirus PCR, Quant      Undetected IU/mL 538 (A) 55 (A) <35 (A) <35 (A) 262 (A)         Review of Systems   Constitution: Negative for chills, decreased appetite, fever, weakness, malaise/fatigue, night sweats, weight gain and weight loss.   HENT: Negative for congestion, ear pain, hearing loss, hoarse voice and sore throat.    Eyes: Negative for blurred vision, redness and  visual disturbance.   Cardiovascular: Negative for chest pain, leg swelling and palpitations.   Respiratory: Negative for cough, hemoptysis, shortness of breath and sputum production.    Hematologic/Lymphatic: Negative for adenopathy. Does not bruise/bleed easily.   Skin: Negative for dry skin, itching, rash and suspicious lesions.   Musculoskeletal: Negative for back pain, joint pain, myalgias and neck pain.   Gastrointestinal: Negative for abdominal pain, constipation, diarrhea, heartburn, nausea and vomiting.   Genitourinary: Negative for dysuria, flank pain, frequency, hematuria, hesitancy and urgency.   Neurological: Negative for dizziness, headaches, numbness and paresthesias.   Psychiatric/Behavioral: Negative for depression and memory loss. The patient does not have insomnia and is not nervous/anxious.      Objective:   Physical Exam   Constitutional: He is oriented to person, place, and time. Vital signs are normal. He appears well-developed and well-nourished.   HENT:   Head: Normocephalic and atraumatic.   Right Ear: External ear normal.   Left Ear: External ear normal.   Nose: Nose normal.   Eyes: Conjunctivae, EOM and lids are normal. Right eye exhibits no discharge. Left eye exhibits no discharge.   Neck: Trachea normal, normal range of motion and full passive range of motion without pain. Neck supple. No JVD present.   Cardiovascular: Normal rate and regular rhythm.   Pulmonary/Chest: Effort normal and breath sounds normal. No respiratory distress.   Abdominal: Soft. Normal appearance. He exhibits no distension. There is no tenderness. There is no guarding.   Musculoskeletal: Normal range of motion. He exhibits no edema.   Neurological: He is alert and oriented to person, place, and time.   Skin: Skin is warm, dry and intact. Capillary refill takes less than 2 seconds.   Psychiatric: He has a normal mood and affect. His speech is normal and behavior is normal. Judgment and thought content normal.  Cognition and memory are normal.   Vitals reviewed.    Assessment:       1. Cytomegalovirus infection, unspecified cytomegaloviral infection type    2. Research study patient        Discussed with patient that if CMV viremia returns that ID research will refer patient back to Dr. Lowry for follow up and treatment. As patient has resistant strain the plan will likely be to admit the patient for PICC line placement and initiate Foscarnet. Will follow up with patient once CMV from this week resulted to let know results and confirm follow up. Patient verbalized understanding.   Plan:     Plan:  1. Study and local labs drawn and processed per protocol  2. EKG performed as per study protocol   3. All study related procedures completed per protocol  4. RTC with labs prior on 02/14/2019 for Visit 11/Week 9 follow up visit.  Call clinic with any questions/concerns.

## 2019-02-11 ENCOUNTER — TELEPHONE (OUTPATIENT)
Dept: INFECTIOUS DISEASES | Facility: HOSPITAL | Age: 59
End: 2019-02-11

## 2019-02-11 NOTE — TELEPHONE ENCOUNTER
Patient completed maribavir therapy on 2/6/2019 - CMV DNAemia at 3,500 at the time.      Left two messages for patient - he should be admitted for initiation of IV foscarnet therapy.    Discussed with Dr. Wolfe.

## 2019-02-12 ENCOUNTER — HOSPITAL ENCOUNTER (INPATIENT)
Facility: HOSPITAL | Age: 59
LOS: 3 days | Discharge: HOME | DRG: 206 | End: 2019-02-15
Attending: INTERNAL MEDICINE | Admitting: INTERNAL MEDICINE
Payer: COMMERCIAL

## 2019-02-12 DIAGNOSIS — E11.65 TYPE 2 DIABETES MELLITUS WITH HYPERGLYCEMIA, WITH LONG-TERM CURRENT USE OF INSULIN: Chronic | ICD-10-CM

## 2019-02-12 DIAGNOSIS — B25.9 CYTOMEGALOVIRUS (CMV) VIREMIA: Primary | ICD-10-CM

## 2019-02-12 DIAGNOSIS — T38.0X5A ADRENAL CORTICAL STEROIDS CAUSING ADVERSE EFFECT IN THERAPEUTIC USE: ICD-10-CM

## 2019-02-12 DIAGNOSIS — E83.42 HYPOMAGNESEMIA: ICD-10-CM

## 2019-02-12 DIAGNOSIS — E66.9 CLASS 1 OBESITY WITH BODY MASS INDEX (BMI) OF 30.0 TO 30.9 IN ADULT, UNSPECIFIED OBESITY TYPE, UNSPECIFIED WHETHER SERIOUS COMORBIDITY PRESENT: ICD-10-CM

## 2019-02-12 DIAGNOSIS — Z79.2 PROPHYLACTIC ANTIBIOTIC: ICD-10-CM

## 2019-02-12 DIAGNOSIS — D84.9 IMMUNOSUPPRESSION: ICD-10-CM

## 2019-02-12 DIAGNOSIS — Z79.4 TYPE 2 DIABETES MELLITUS WITH HYPERGLYCEMIA, WITH LONG-TERM CURRENT USE OF INSULIN: Chronic | ICD-10-CM

## 2019-02-12 DIAGNOSIS — Z94.2 LUNG REPLACED BY TRANSPLANT: ICD-10-CM

## 2019-02-12 LAB
ALBUMIN SERPL BCP-MCNC: 3.8 G/DL
ALP SERPL-CCNC: 86 U/L
ALT SERPL W/O P-5'-P-CCNC: 26 U/L
ANION GAP SERPL CALC-SCNC: 8 MMOL/L
ANION GAP SERPL CALC-SCNC: 8 MMOL/L
AST SERPL-CCNC: 22 U/L
BASOPHILS # BLD AUTO: 0.06 K/UL
BASOPHILS NFR BLD: 0.9 %
BILIRUB SERPL-MCNC: 0.5 MG/DL
BUN SERPL-MCNC: 18 MG/DL
BUN SERPL-MCNC: 18 MG/DL
CALCIUM SERPL-MCNC: 9.4 MG/DL
CALCIUM SERPL-MCNC: 9.4 MG/DL
CHLORIDE SERPL-SCNC: 101 MMOL/L
CHLORIDE SERPL-SCNC: 101 MMOL/L
CO2 SERPL-SCNC: 27 MMOL/L
CO2 SERPL-SCNC: 27 MMOL/L
CREAT SERPL-MCNC: 1.1 MG/DL
CREAT SERPL-MCNC: 1.1 MG/DL
DIFFERENTIAL METHOD: ABNORMAL
EOSINOPHIL # BLD AUTO: 0.2 K/UL
EOSINOPHIL NFR BLD: 3.2 %
ERYTHROCYTE [DISTWIDTH] IN BLOOD BY AUTOMATED COUNT: 14.2 %
EST. GFR  (AFRICAN AMERICAN): >60 ML/MIN/1.73 M^2
EST. GFR  (AFRICAN AMERICAN): >60 ML/MIN/1.73 M^2
EST. GFR  (NON AFRICAN AMERICAN): >60 ML/MIN/1.73 M^2
EST. GFR  (NON AFRICAN AMERICAN): >60 ML/MIN/1.73 M^2
ESTIMATED AVG GLUCOSE: 266 MG/DL
GLUCOSE SERPL-MCNC: 267 MG/DL
GLUCOSE SERPL-MCNC: 267 MG/DL
HBA1C MFR BLD HPLC: 10.9 %
HCT VFR BLD AUTO: 34.1 %
HGB BLD-MCNC: 11.2 G/DL
IMM GRANULOCYTES # BLD AUTO: 0.03 K/UL
IMM GRANULOCYTES NFR BLD AUTO: 0.5 %
LYMPHOCYTES # BLD AUTO: 1 K/UL
LYMPHOCYTES NFR BLD: 14.7 %
MCH RBC QN AUTO: 29.7 PG
MCHC RBC AUTO-ENTMCNC: 32.8 G/DL
MCV RBC AUTO: 91 FL
MONOCYTES # BLD AUTO: 0.5 K/UL
MONOCYTES NFR BLD: 8.3 %
NEUTROPHILS # BLD AUTO: 4.7 K/UL
NEUTROPHILS NFR BLD: 72.4 %
NRBC BLD-RTO: 0 /100 WBC
PLATELET # BLD AUTO: 249 K/UL
PMV BLD AUTO: 8.6 FL
POCT GLUCOSE: 255 MG/DL (ref 70–110)
POCT GLUCOSE: 256 MG/DL (ref 70–110)
POTASSIUM SERPL-SCNC: 4.3 MMOL/L
POTASSIUM SERPL-SCNC: 4.3 MMOL/L
PROT SERPL-MCNC: 7 G/DL
RBC # BLD AUTO: 3.77 M/UL
SODIUM SERPL-SCNC: 136 MMOL/L
SODIUM SERPL-SCNC: 136 MMOL/L
WBC # BLD AUTO: 6.47 K/UL

## 2019-02-12 PROCEDURE — 25000003 PHARM REV CODE 250: Performed by: INTERNAL MEDICINE

## 2019-02-12 PROCEDURE — 80053 COMPREHEN METABOLIC PANEL: CPT

## 2019-02-12 PROCEDURE — 99223 1ST HOSP IP/OBS HIGH 75: CPT | Mod: ,,, | Performed by: NURSE PRACTITIONER

## 2019-02-12 PROCEDURE — 63600175 PHARM REV CODE 636 W HCPCS: Performed by: NURSE PRACTITIONER

## 2019-02-12 PROCEDURE — 63600175 PHARM REV CODE 636 W HCPCS: Performed by: PHYSICIAN ASSISTANT

## 2019-02-12 PROCEDURE — 36415 COLL VENOUS BLD VENIPUNCTURE: CPT

## 2019-02-12 PROCEDURE — 87632 RESP VIRUS 6-11 TARGETS: CPT

## 2019-02-12 PROCEDURE — 63600175 PHARM REV CODE 636 W HCPCS: Mod: JG | Performed by: INTERNAL MEDICINE

## 2019-02-12 PROCEDURE — 85025 COMPLETE CBC W/AUTO DIFF WBC: CPT

## 2019-02-12 PROCEDURE — 83036 HEMOGLOBIN GLYCOSYLATED A1C: CPT

## 2019-02-12 PROCEDURE — 25000003 PHARM REV CODE 250: Performed by: PHYSICIAN ASSISTANT

## 2019-02-12 PROCEDURE — 20600001 HC STEP DOWN PRIVATE ROOM

## 2019-02-12 PROCEDURE — 99223 1ST HOSP IP/OBS HIGH 75: CPT | Mod: AI,,, | Performed by: INTERNAL MEDICINE

## 2019-02-12 PROCEDURE — 99223 PR INITIAL HOSPITAL CARE,LEVL III: ICD-10-PCS | Mod: ,,, | Performed by: NURSE PRACTITIONER

## 2019-02-12 PROCEDURE — 99223 PR INITIAL HOSPITAL CARE,LEVL III: ICD-10-PCS | Mod: AI,,, | Performed by: INTERNAL MEDICINE

## 2019-02-12 RX ORDER — FERROUS SULFATE, DRIED 160(50) MG
1 TABLET, EXTENDED RELEASE ORAL 2 TIMES DAILY
Status: DISCONTINUED | OUTPATIENT
Start: 2019-02-12 | End: 2019-02-15 | Stop reason: HOSPADM

## 2019-02-12 RX ORDER — ACETAMINOPHEN 500 MG
1000 TABLET ORAL EVERY 6 HOURS PRN
Status: DISCONTINUED | OUTPATIENT
Start: 2019-02-12 | End: 2019-02-15 | Stop reason: HOSPADM

## 2019-02-12 RX ORDER — ENOXAPARIN SODIUM 100 MG/ML
40 INJECTION SUBCUTANEOUS EVERY 24 HOURS
Status: DISCONTINUED | OUTPATIENT
Start: 2019-02-12 | End: 2019-02-15 | Stop reason: HOSPADM

## 2019-02-12 RX ORDER — AMLODIPINE BESYLATE 5 MG/1
5 TABLET ORAL DAILY
Status: DISCONTINUED | OUTPATIENT
Start: 2019-02-13 | End: 2019-02-15 | Stop reason: HOSPADM

## 2019-02-12 RX ORDER — FLUTICASONE PROPIONATE 50 MCG
1 SPRAY, SUSPENSION (ML) NASAL DAILY
Status: DISCONTINUED | OUTPATIENT
Start: 2019-02-13 | End: 2019-02-15 | Stop reason: HOSPADM

## 2019-02-12 RX ORDER — LANOLIN ALCOHOL/MO/W.PET/CERES
400 CREAM (GRAM) TOPICAL 2 TIMES DAILY
Status: DISCONTINUED | OUTPATIENT
Start: 2019-02-12 | End: 2019-02-14

## 2019-02-12 RX ORDER — FAMOTIDINE 20 MG/1
20 TABLET, FILM COATED ORAL 2 TIMES DAILY
Status: DISCONTINUED | OUTPATIENT
Start: 2019-02-12 | End: 2019-02-15 | Stop reason: HOSPADM

## 2019-02-12 RX ORDER — SULFAMETHOXAZOLE AND TRIMETHOPRIM 800; 160 MG/1; MG/1
1 TABLET ORAL
Status: DISCONTINUED | OUTPATIENT
Start: 2019-02-13 | End: 2019-02-15 | Stop reason: HOSPADM

## 2019-02-12 RX ORDER — PRAVASTATIN SODIUM 20 MG/1
20 TABLET ORAL DAILY
Status: DISCONTINUED | OUTPATIENT
Start: 2019-02-13 | End: 2019-02-15 | Stop reason: HOSPADM

## 2019-02-12 RX ORDER — FOLIC ACID 1 MG/1
1000 TABLET ORAL DAILY
Status: DISCONTINUED | OUTPATIENT
Start: 2019-02-13 | End: 2019-02-15 | Stop reason: HOSPADM

## 2019-02-12 RX ORDER — POTASSIUM CHLORIDE 20 MEQ/15ML
60 SOLUTION ORAL
Status: DISCONTINUED | OUTPATIENT
Start: 2019-02-12 | End: 2019-02-15 | Stop reason: HOSPADM

## 2019-02-12 RX ORDER — IBUPROFEN 200 MG
16 TABLET ORAL
Status: DISCONTINUED | OUTPATIENT
Start: 2019-02-12 | End: 2019-02-15 | Stop reason: HOSPADM

## 2019-02-12 RX ORDER — IBUPROFEN 200 MG
24 TABLET ORAL
Status: DISCONTINUED | OUTPATIENT
Start: 2019-02-12 | End: 2019-02-15 | Stop reason: HOSPADM

## 2019-02-12 RX ORDER — INSULIN ASPART 100 [IU]/ML
10 INJECTION, SOLUTION INTRAVENOUS; SUBCUTANEOUS
Status: DISCONTINUED | OUTPATIENT
Start: 2019-02-12 | End: 2019-02-13

## 2019-02-12 RX ORDER — ONDANSETRON 4 MG/1
8 TABLET, FILM COATED ORAL EVERY 6 HOURS PRN
Status: DISCONTINUED | OUTPATIENT
Start: 2019-02-12 | End: 2019-02-15 | Stop reason: HOSPADM

## 2019-02-12 RX ORDER — LEVALBUTEROL 1.25 MG/.5ML
1.25 SOLUTION, CONCENTRATE RESPIRATORY (INHALATION) EVERY 6 HOURS PRN
Status: DISCONTINUED | OUTPATIENT
Start: 2019-02-12 | End: 2019-02-15 | Stop reason: HOSPADM

## 2019-02-12 RX ORDER — MAGNESIUM SULFATE HEPTAHYDRATE 40 MG/ML
2 INJECTION, SOLUTION INTRAVENOUS
Status: DISCONTINUED | OUTPATIENT
Start: 2019-02-12 | End: 2019-02-15 | Stop reason: HOSPADM

## 2019-02-12 RX ORDER — POTASSIUM CHLORIDE 20 MEQ/15ML
40 SOLUTION ORAL
Status: DISCONTINUED | OUTPATIENT
Start: 2019-02-12 | End: 2019-02-15 | Stop reason: HOSPADM

## 2019-02-12 RX ORDER — PREDNISONE 10 MG/1
10 TABLET ORAL DAILY
Status: DISCONTINUED | OUTPATIENT
Start: 2019-02-13 | End: 2019-02-15

## 2019-02-12 RX ORDER — GLUCAGON 1 MG
1 KIT INJECTION
Status: DISCONTINUED | OUTPATIENT
Start: 2019-02-12 | End: 2019-02-15 | Stop reason: HOSPADM

## 2019-02-12 RX ORDER — ASPIRIN 81 MG/1
81 TABLET ORAL DAILY
Status: DISCONTINUED | OUTPATIENT
Start: 2019-02-13 | End: 2019-02-15 | Stop reason: HOSPADM

## 2019-02-12 RX ORDER — INSULIN ASPART 100 [IU]/ML
0-5 INJECTION, SOLUTION INTRAVENOUS; SUBCUTANEOUS
Status: DISCONTINUED | OUTPATIENT
Start: 2019-02-12 | End: 2019-02-15 | Stop reason: HOSPADM

## 2019-02-12 RX ADMIN — TACROLIMUS 1.5 MG: 1 CAPSULE ORAL at 06:02

## 2019-02-12 RX ADMIN — FOSCARNET SODIUM 6000 MG: 24 INJECTION, SOLUTION INTRAVENOUS at 08:02

## 2019-02-12 RX ADMIN — FAMOTIDINE 20 MG: 20 TABLET ORAL at 08:02

## 2019-02-12 RX ADMIN — ENOXAPARIN SODIUM 40 MG: 100 INJECTION SUBCUTANEOUS at 06:02

## 2019-02-12 RX ADMIN — SODIUM CHLORIDE 500 ML: 0.9 INJECTION, SOLUTION INTRAVENOUS at 08:02

## 2019-02-12 RX ADMIN — INSULIN ASPART 10 UNITS: 100 INJECTION, SOLUTION INTRAVENOUS; SUBCUTANEOUS at 08:02

## 2019-02-12 RX ADMIN — MAGNESIUM OXIDE TAB 400 MG (241.3 MG ELEMENTAL MG) 400 MG: 400 (241.3 MG) TAB at 08:02

## 2019-02-12 RX ADMIN — OYSTER SHELL CALCIUM WITH VITAMIN D 1 TABLET: 500; 200 TABLET, FILM COATED ORAL at 08:02

## 2019-02-12 NOTE — CONSULTS
"[facility]  Endocrinology  Diabetes Consult Note    Consult Requested by: Darrick Wolfe MD   Reason for admit: <principal problem not specified>    HISTORY OF PRESENT ILLNESS:  Reason for Consult: Management of T2DM, Hyperglycemia     Surgical Procedure and Date: 2017 - [place]. Lung Transplant    Diabetes diagnosis year:     Home Diabetes Medications:       Tresiba 20 units SQ Daily     Humalog 14 units SQ TIDWM     Metformin 500 mg PO BID    How often checking glucose at home? One   BG readings on regimen: 250's - 300's  Hypoglycemia on the regimen?  No  Missed doses on regimen?  Yes    Diabetes Complications include:     Hyperglycemia, Hypoglycemia  and Hypoglycemia unawareness    Complicating diabetes co morbidities:   KRISTYN and Glucocorticoid use , A-fib, AVILA, Pancreatitis, Osteopenia    Lab Results   Component Value Date    HGBA1C 8.5 (H) 10/04/2018       HPI:   Patient is a 58 y.o. male with a diagnosis of DM2, Scarcoidosis, KRISTYN, A-fib, AVILA, history of Pancreatitis, and osteopenia. Admitted to [facility] for complications related to lung transplant received in . Patient endorses non-compliance with DM regimen. He is a patient of [facility]. Next appointment is scheduled for 2019. Endocrinology consulted to manage DM2/hyperglycemia.             Interval HPI:   Overnight events:     Is now in TSU. No BG monitoring established prior to admission. Patient last took all of home DM regimen at 10 am (tresiba, humalog, and metformin). Prednisone 10mg po. Will monitor for prandial excursions.    Eatin%  Nausea: No  Hypoglycemia and intervention: No  Fever: No  TPN and/or TF: No  If yes, type of TF/TPN and rate: None    PMH, PSH, FH, SH reviewed     Review of Systems  Constitutional: Positive for weight changes. Complains of 5lbs gain in the "past few months."   Eyes: Negative for visual disturbance.  Respiratory: Positive for cough.   Cardiovascular: Negative for " chest pain.  Gastrointestinal: Negative for nausea. Positive for diarrhea  Endocrine: Positive for polyuria, polydipsia.  Musculoskeletal: Negative for back pain.  Skin: Negative for rash.  Neurological: Negative for syncope.  Psychiatric/Behavioral: Negative for depression.    Current Medications and/or Treatments Impacting Glycemic Control  Immunotherapy:    Immunosuppressants         Stop Route Frequency     tacrolimus capsule 1.5 mg      -- Oral 2 times daily        Steroids:   Hormones (From admission, onward)    Start     Stop Route Frequency Ordered    02/13/19 0900  predniSONE tablet 10 mg      -- Oral Daily 02/12/19 1639        Pressors:    Autonomic Drugs (From admission, onward)    None        Hyperglycemia/Diabetes Medications:   Antihyperglycemics (From admission, onward)    Start     Stop Route Frequency Ordered    02/13/19 0900  insulin detemir U-100 pen 18 Units      -- SubQ Daily 02/12/19 1743    02/12/19 1842  insulin aspart U-100 pen 0-5 Units      -- SubQ Before meals & nightly PRN 02/12/19 1743    02/12/19 1745  insulin aspart U-100 pen 10 Units      -- SubQ 3 times daily with meals 02/12/19 1743             PHYSICAL EXAMINATION:  Vitals:    02/12/19 1730   BP: (!) 142/87   Pulse: 95   Resp: (!) 23   Temp: 98.2 °F (36.8 °C)     Body mass index is 31.36 kg/m².    Physical Exam   Constitutional: Well developed, well nourished, NAD.  ENT: External ears no masses with nose patent; normal hearing.  Neck: Supple; trachea midline.  Cardiovascular: Normal heart sounds, no LE edema. DP +2 bilaterally.  Lungs: Normal effort; lungs anterior bilaterally clear to auscultation.  Abdomen: Soft, no masses, no hernias.  MS: No clubbing or cyanosis of nails noted; Normal gait.  Skin: No rashes, lesions, or ulcers; no nodules. No lipohypertrophy noted.   Psychiatric: Good judgement and insight; normal mood and affect.  Neurological: Cranial nerves are grossly intact. Normal sensation in the bilateral lower  extremities.  Foot: Nails in good condition, no amputations noted.         Labs Reviewed and Include   No results for input(s): GLU, CALCIUM, ALBUMIN, PROT, NA, K, CO2, CL, BUN, CREATININE, ALKPHOS, ALT, AST, BILITOT in the last 24 hours.  Lab Results   Component Value Date    WBC 9.56 01/18/2019    HGB 12.5 (L) 01/18/2019    HCT 38.9 (L) 01/18/2019    MCV 91 01/18/2019     01/18/2019     No results for input(s): TSH, FREET4 in the last 168 hours.  Lab Results   Component Value Date    HGBA1C 8.5 (H) 10/04/2018       Nutritional status:   Body mass index is 31.36 kg/m².  Lab Results   Component Value Date    ALBUMIN 3.7 01/18/2019    ALBUMIN 3.7 12/24/2018    ALBUMIN 3.6 12/06/2018    ALBUMIN 3.6 12/06/2018     No results found for: PREALBUMIN    CrCl cannot be calculated (Patient's most recent lab result is older than the maximum 7 days allowed.).    Accu-Checks  No results for input(s): POCTGLUCOSE in the last 72 hours.     ASSESSMENT and PLAN    Cytomegalovirus (CMV) viremia    Managed per primary team  Avoid hypoglycemia         Adrenal cortical steroids causing adverse effect in therapeutic use    On standard steroids per transplant team; may elevate BG readings         Immunosuppression    On standard steroids per transplant team; may elevate BG readings         Class 1 obesity with body mass index (BMI) of 30.0 to 30.9 in adult    may contribute to insulin resistance  Body mass index is 31.36 kg/m².           Type 2 Diabetes Mellitus        Start Levemir 18 units daily first dose 02/13/2019 (20% reduction from home dose)       Start Novolog 10 units TIDWM with Low Dose SQ Insulin Correction Scale.        BG monitoring AC/HS    ** Please call Endocrine for any BG related issues **    Discharge Recommendations:     TBD. Patient has appointment in April 2019 with [facility].     Plan discussed with patient and RN at bedside.     Delroy Vergara NP  Endocrinology  [facility]

## 2019-02-12 NOTE — SUBJECTIVE & OBJECTIVE
"Interval HPI:   Overnight events:     Is now in TSU. No BG monitoring established prior to admission. Patient last took all of home DM regimen at 10 am (tresiba, humalog, and metformin). Prednisone 10mg po. Will monitor for prandial excursions.    Eatin%  Nausea: No  Hypoglycemia and intervention: No  Fever: No  TPN and/or TF: No  If yes, type of TF/TPN and rate: None    PMH, PSH, FH, SH reviewed     Review of Systems  Constitutional: Positive for weight changes. Complains of 5lbs gain in the "past few months."   Eyes: Negative for visual disturbance.  Respiratory: Positive for cough.   Cardiovascular: Negative for chest pain.  Gastrointestinal: Negative for nausea. Positive for diarrhea  Endocrine: Positive for polyuria, polydipsia.  Musculoskeletal: Negative for back pain.  Skin: Negative for rash.  Neurological: Negative for syncope.  Psychiatric/Behavioral: Negative for depression.    Current Medications and/or Treatments Impacting Glycemic Control  Immunotherapy:    Immunosuppressants         Stop Route Frequency     tacrolimus capsule 1.5 mg      -- Oral 2 times daily        Steroids:   Hormones (From admission, onward)    Start     Stop Route Frequency Ordered    19 0900  predniSONE tablet 10 mg      -- Oral Daily 19 1639        Pressors:    Autonomic Drugs (From admission, onward)    None        Hyperglycemia/Diabetes Medications:   Antihyperglycemics (From admission, onward)    Start     Stop Route Frequency Ordered    19 0900  insulin detemir U-100 pen 18 Units      -- SubQ Daily 19 1743    19 1842  insulin aspart U-100 pen 0-5 Units      -- SubQ Before meals & nightly PRN 19 1743    19 1745  insulin aspart U-100 pen 10 Units      -- SubQ 3 times daily with meals 19 1743             PHYSICAL EXAMINATION:  Vitals:    19 1730   BP: (!) 142/87   Pulse: 95   Resp: (!) 23   Temp: 98.2 °F (36.8 °C)     Body mass index is 31.36 kg/m².    Physical Exam "   Constitutional: Well developed, well nourished, NAD.  ENT: External ears no masses with nose patent; normal hearing.  Neck: Supple; trachea midline.  Cardiovascular: Normal heart sounds, no LE edema. DP +2 bilaterally.  Lungs: Normal effort; lungs anterior bilaterally clear to auscultation.  Abdomen: Soft, no masses, no hernias.  MS: No clubbing or cyanosis of nails noted; Normal gait.  Skin: No rashes, lesions, or ulcers; no nodules. No lipohypertrophy noted.   Psychiatric: Good judgement and insight; normal mood and affect.  Neurological: Cranial nerves are grossly intact. Normal sensation in the bilateral lower extremities.  Foot: Nails in good condition, no amputations noted.

## 2019-02-12 NOTE — HPI
Reason for Consult: Management of T2DM, Hyperglycemia     Surgical Procedure and Date: 08/27/2017 - [place]. Lung Transplant    Diabetes diagnosis year: 2017    Home Diabetes Medications:       Tresiba 20 units SQ Daily     Humalog 14 units SQ TIDWM     Metformin 500 mg PO BID    How often checking glucose at home? One   BG readings on regimen: 250's - 300's  Hypoglycemia on the regimen?  No  Missed doses on regimen?  Yes    Diabetes Complications include:     Hyperglycemia, Hypoglycemia  and Hypoglycemia unawareness    Complicating diabetes co morbidities:   KRISTYN and Glucocorticoid use , A-fib, AVILA, Pancreatitis, Osteopenia    Lab Results   Component Value Date    HGBA1C 8.5 (H) 10/04/2018       HPI:   Patient is a 58 y.o. male with a diagnosis of DM2, Scarcoidosis, KRISTYN, A-fib, AVILA, history of Pancreatitis, and osteopenia. Admitted to [facility] for complications related to lung transplant received in 2017. Patient endorses non-compliance with DM regimen. He is a patient of [facility] Endocrinology clinic. Next appointment is scheduled for 04/2019. Endocrinology consulted to manage DM2/hyperglycemia.

## 2019-02-13 ENCOUNTER — RESEARCH ENCOUNTER (OUTPATIENT)
Dept: RESEARCH | Facility: CLINIC | Age: 59
End: 2019-02-13
Payer: COMMERCIAL

## 2019-02-13 LAB
ALBUMIN SERPL BCP-MCNC: 3.7 G/DL
ALP SERPL-CCNC: 85 U/L
ALT SERPL W/O P-5'-P-CCNC: 26 U/L
ANION GAP SERPL CALC-SCNC: 10 MMOL/L
ANISOCYTOSIS BLD QL SMEAR: SLIGHT
AST SERPL-CCNC: 26 U/L
BASOPHILS # BLD AUTO: 0.06 K/UL
BASOPHILS NFR BLD: 1.2 %
BILIRUB SERPL-MCNC: 0.6 MG/DL
BUN SERPL-MCNC: 14 MG/DL
CALCIUM SERPL-MCNC: 9.8 MG/DL
CHLORIDE SERPL-SCNC: 102 MMOL/L
CO2 SERPL-SCNC: 26 MMOL/L
CREAT SERPL-MCNC: 0.8 MG/DL
DIFFERENTIAL METHOD: ABNORMAL
EOSINOPHIL # BLD AUTO: 0.3 K/UL
EOSINOPHIL NFR BLD: 5.6 %
ERYTHROCYTE [DISTWIDTH] IN BLOOD BY AUTOMATED COUNT: 14.2 %
EST. GFR  (AFRICAN AMERICAN): >60 ML/MIN/1.73 M^2
EST. GFR  (NON AFRICAN AMERICAN): >60 ML/MIN/1.73 M^2
GLUCOSE SERPL-MCNC: 184 MG/DL
HCT VFR BLD AUTO: 41.3 %
HGB BLD-MCNC: 12.8 G/DL
HYPOCHROMIA BLD QL SMEAR: ABNORMAL
IMM GRANULOCYTES # BLD AUTO: 0.07 K/UL
IMM GRANULOCYTES NFR BLD AUTO: 1.4 %
LYMPHOCYTES # BLD AUTO: 0.9 K/UL
LYMPHOCYTES NFR BLD: 16.5 %
MAGNESIUM SERPL-MCNC: 1.1 MG/DL
MAGNESIUM SERPL-MCNC: 1.3 MG/DL
MCH RBC QN AUTO: 29.2 PG
MCHC RBC AUTO-ENTMCNC: 31 G/DL
MCV RBC AUTO: 94 FL
MONOCYTES # BLD AUTO: 0.5 K/UL
MONOCYTES NFR BLD: 10.3 %
NEUTROPHILS # BLD AUTO: 3.4 K/UL
NEUTROPHILS NFR BLD: 65 %
NRBC BLD-RTO: 0 /100 WBC
OVALOCYTES BLD QL SMEAR: ABNORMAL
PLATELET # BLD AUTO: 218 K/UL
PMV BLD AUTO: 9.5 FL
POCT GLUCOSE: 180 MG/DL (ref 70–110)
POCT GLUCOSE: 229 MG/DL (ref 70–110)
POCT GLUCOSE: 237 MG/DL (ref 70–110)
POCT GLUCOSE: 250 MG/DL (ref 70–110)
POCT GLUCOSE: 311 MG/DL (ref 70–110)
POIKILOCYTOSIS BLD QL SMEAR: SLIGHT
POLYCHROMASIA BLD QL SMEAR: ABNORMAL
POTASSIUM SERPL-SCNC: 4.2 MMOL/L
PROT SERPL-MCNC: 7.2 G/DL
RBC # BLD AUTO: 4.39 M/UL
SODIUM SERPL-SCNC: 138 MMOL/L
TACROLIMUS BLD-MCNC: 11.4 NG/ML
WBC # BLD AUTO: 5.16 K/UL

## 2019-02-13 PROCEDURE — 25000003 PHARM REV CODE 250: Performed by: PHYSICIAN ASSISTANT

## 2019-02-13 PROCEDURE — 99232 SBSQ HOSP IP/OBS MODERATE 35: CPT | Mod: ,,, | Performed by: NURSE PRACTITIONER

## 2019-02-13 PROCEDURE — 99255 IP/OBS CONSLTJ NEW/EST HI 80: CPT | Mod: ,,, | Performed by: INTERNAL MEDICINE

## 2019-02-13 PROCEDURE — S5571 INSULIN DISPOS PEN 3 ML: HCPCS | Performed by: NURSE PRACTITIONER

## 2019-02-13 PROCEDURE — 85025 COMPLETE CBC W/AUTO DIFF WBC: CPT

## 2019-02-13 PROCEDURE — 99255 PR INITIAL INPATIENT CONSULT,LEVL V: ICD-10-PCS | Mod: ,,, | Performed by: INTERNAL MEDICINE

## 2019-02-13 PROCEDURE — A4216 STERILE WATER/SALINE, 10 ML: HCPCS | Performed by: INTERNAL MEDICINE

## 2019-02-13 PROCEDURE — 63600175 PHARM REV CODE 636 W HCPCS: Performed by: NURSE PRACTITIONER

## 2019-02-13 PROCEDURE — 36569 INSJ PICC 5 YR+ W/O IMAGING: CPT

## 2019-02-13 PROCEDURE — 63600175 PHARM REV CODE 636 W HCPCS: Performed by: PHYSICIAN ASSISTANT

## 2019-02-13 PROCEDURE — 99499 UNLISTED E&M SERVICE: CPT | Mod: S$GLB,,, | Performed by: CLINICAL NURSE SPECIALIST

## 2019-02-13 PROCEDURE — 99232 SBSQ HOSP IP/OBS MODERATE 35: CPT | Mod: ,,, | Performed by: INTERNAL MEDICINE

## 2019-02-13 PROCEDURE — 63600175 PHARM REV CODE 636 W HCPCS: Mod: JG | Performed by: INTERNAL MEDICINE

## 2019-02-13 PROCEDURE — 83735 ASSAY OF MAGNESIUM: CPT

## 2019-02-13 PROCEDURE — 25000003 PHARM REV CODE 250: Performed by: INTERNAL MEDICINE

## 2019-02-13 PROCEDURE — 76937 US GUIDE VASCULAR ACCESS: CPT

## 2019-02-13 PROCEDURE — 80197 ASSAY OF TACROLIMUS: CPT

## 2019-02-13 PROCEDURE — 36415 COLL VENOUS BLD VENIPUNCTURE: CPT

## 2019-02-13 PROCEDURE — C1751 CATH, INF, PER/CENT/MIDLINE: HCPCS

## 2019-02-13 PROCEDURE — 83735 ASSAY OF MAGNESIUM: CPT | Mod: 91

## 2019-02-13 PROCEDURE — 99499 NO LOS: ICD-10-PCS | Mod: S$GLB,,, | Performed by: CLINICAL NURSE SPECIALIST

## 2019-02-13 PROCEDURE — 20600001 HC STEP DOWN PRIVATE ROOM

## 2019-02-13 PROCEDURE — 80053 COMPREHEN METABOLIC PANEL: CPT

## 2019-02-13 PROCEDURE — 99232 PR SUBSEQUENT HOSPITAL CARE,LEVL II: ICD-10-PCS | Mod: ,,, | Performed by: NURSE PRACTITIONER

## 2019-02-13 PROCEDURE — 25000242 PHARM REV CODE 250 ALT 637 W/ HCPCS: Performed by: PHYSICIAN ASSISTANT

## 2019-02-13 PROCEDURE — 99232 PR SUBSEQUENT HOSPITAL CARE,LEVL II: ICD-10-PCS | Mod: ,,, | Performed by: INTERNAL MEDICINE

## 2019-02-13 RX ORDER — INSULIN ASPART 100 [IU]/ML
12 INJECTION, SOLUTION INTRAVENOUS; SUBCUTANEOUS
Status: DISCONTINUED | OUTPATIENT
Start: 2019-02-14 | End: 2019-02-14

## 2019-02-13 RX ORDER — SODIUM CHLORIDE 0.9 % (FLUSH) 0.9 %
10 SYRINGE (ML) INJECTION EVERY 6 HOURS
Status: DISCONTINUED | OUTPATIENT
Start: 2019-02-13 | End: 2019-02-15 | Stop reason: HOSPADM

## 2019-02-13 RX ORDER — SODIUM CHLORIDE 0.9 % (FLUSH) 0.9 %
10 SYRINGE (ML) INJECTION
Status: DISCONTINUED | OUTPATIENT
Start: 2019-02-13 | End: 2019-02-15 | Stop reason: HOSPADM

## 2019-02-13 RX ADMIN — AMLODIPINE BESYLATE 5 MG: 5 TABLET ORAL at 08:02

## 2019-02-13 RX ADMIN — FOSCARNET SODIUM 6000 MG: 24 INJECTION, SOLUTION INTRAVENOUS at 03:02

## 2019-02-13 RX ADMIN — INSULIN ASPART 10 UNITS: 100 INJECTION, SOLUTION INTRAVENOUS; SUBCUTANEOUS at 12:02

## 2019-02-13 RX ADMIN — FOSCARNET SODIUM 6000 MG: 24 INJECTION, SOLUTION INTRAVENOUS at 05:02

## 2019-02-13 RX ADMIN — FAMOTIDINE 20 MG: 20 TABLET ORAL at 08:02

## 2019-02-13 RX ADMIN — FOLIC ACID 1000 MCG: 1 TABLET ORAL at 08:02

## 2019-02-13 RX ADMIN — PRAVASTATIN SODIUM 20 MG: 20 TABLET ORAL at 08:02

## 2019-02-13 RX ADMIN — TACROLIMUS 1.5 MG: 1 CAPSULE ORAL at 07:02

## 2019-02-13 RX ADMIN — INSULIN ASPART 10 UNITS: 100 INJECTION, SOLUTION INTRAVENOUS; SUBCUTANEOUS at 07:02

## 2019-02-13 RX ADMIN — ASPIRIN 81 MG: 81 TABLET, COATED ORAL at 08:02

## 2019-02-13 RX ADMIN — SODIUM CHLORIDE 500 ML: 0.9 INJECTION, SOLUTION INTRAVENOUS at 04:02

## 2019-02-13 RX ADMIN — FOSCARNET SODIUM 6000 MG: 24 INJECTION, SOLUTION INTRAVENOUS at 11:02

## 2019-02-13 RX ADMIN — INSULIN ASPART 2 UNITS: 100 INJECTION, SOLUTION INTRAVENOUS; SUBCUTANEOUS at 12:02

## 2019-02-13 RX ADMIN — PREDNISONE 10 MG: 10 TABLET ORAL at 08:02

## 2019-02-13 RX ADMIN — SODIUM CHLORIDE 500 ML: 0.9 INJECTION, SOLUTION INTRAVENOUS at 11:02

## 2019-02-13 RX ADMIN — MAGNESIUM OXIDE TAB 400 MG (241.3 MG ELEMENTAL MG) 400 MG: 400 (241.3 MG) TAB at 08:02

## 2019-02-13 RX ADMIN — TACROLIMUS 1.5 MG: 1 CAPSULE ORAL at 05:02

## 2019-02-13 RX ADMIN — OYSTER SHELL CALCIUM WITH VITAMIN D 1 TABLET: 500; 200 TABLET, FILM COATED ORAL at 08:02

## 2019-02-13 RX ADMIN — SULFAMETHOXAZOLE AND TRIMETHOPRIM 1 TABLET: 800; 160 TABLET ORAL at 05:02

## 2019-02-13 RX ADMIN — SODIUM CHLORIDE 500 ML: 0.9 INJECTION, SOLUTION INTRAVENOUS at 01:02

## 2019-02-13 RX ADMIN — Medication 10 ML: at 05:02

## 2019-02-13 RX ADMIN — MAGNESIUM SULFATE IN WATER 2 G: 40 INJECTION, SOLUTION INTRAVENOUS at 08:02

## 2019-02-13 RX ADMIN — MAGNESIUM SULFATE IN WATER 2 G: 40 INJECTION, SOLUTION INTRAVENOUS at 11:02

## 2019-02-13 RX ADMIN — INSULIN ASPART 4 UNITS: 100 INJECTION, SOLUTION INTRAVENOUS; SUBCUTANEOUS at 05:02

## 2019-02-13 RX ADMIN — Medication 10 ML: at 01:02

## 2019-02-13 RX ADMIN — INSULIN ASPART 10 UNITS: 100 INJECTION, SOLUTION INTRAVENOUS; SUBCUTANEOUS at 05:02

## 2019-02-13 RX ADMIN — FLUTICASONE PROPIONATE 50 MCG: 50 SPRAY, METERED NASAL at 08:02

## 2019-02-13 RX ADMIN — INSULIN ASPART 2 UNITS: 100 INJECTION, SOLUTION INTRAVENOUS; SUBCUTANEOUS at 07:02

## 2019-02-13 RX ADMIN — INSULIN DETEMIR 18 UNITS: 100 INJECTION, SOLUTION SUBCUTANEOUS at 08:02

## 2019-02-13 NOTE — ASSESSMENT & PLAN NOTE
Patient is 17 months s/p bilateral lung transplant secondary to diagnosis of sarcoidosis with associated pulmonary hypertension. 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      Will continue current immunosuppression and prophylaxis. Most recent bronchoscopies in 08/2018 and 11/2018 negative for rejection. RVP is pending..

## 2019-02-13 NOTE — PLAN OF CARE
Problem: Adult Inpatient Plan of Care  Goal: Plan of Care Review  Outcome: Ongoing (interventions implemented as appropriate)  VSS. Call light and personal items in reach. PICC line placed and placement confirmed by xray. %00ml bolus  Given and Foscavir administered over 2hrs. NO issues today. WCTM.

## 2019-02-13 NOTE — PLAN OF CARE
Problem: Adult Inpatient Plan of Care  Goal: Plan of Care Review  Assessment and Plan     Nutrition Problem  Altered nutrition related lab values     Related to (etiology):   Uncontrolled diabetes     Signs and Symptoms (as evidenced by):   A1c 10.9, pt admits skipping insulin doses     Interventions/Recommendations (treatment strategy):  Collaboration of care.  Educated pt on importance of taking insulin, healthy diet with consistent carbs.       Recommendations     Recommendation/Intervention: 1. Continue diabetic diet.  Goals: 1. Pt to consume >75% meals.  Nutrition Goal Status: new

## 2019-02-13 NOTE — ASSESSMENT & PLAN NOTE
Continue tacrolimus 1.5 mg BID. Will monitor daily tacrolimus levels and dose adjust accordingly.     Patient has remained off of MMF therapy due to active CMV infection.    Previously on a prednisone taper. Will order prednisone 10 mg daily but will likely decrease dose to previous 5 mg dose tomorrow.

## 2019-02-13 NOTE — ASSESSMENT & PLAN NOTE
Continue tacrolimus 1.5 mg BID. Will monitor daily tacrolimus levels and dose adjust accordingly.     Patient has remained off of MMF therapy due to active CMV infection.    Previously on a prednisone taper. Will continue prednisone 10 mg daily for now, but will likely decrease dose to previous 5 mg soon.

## 2019-02-13 NOTE — ASSESSMENT & PLAN NOTE
Patient was noted to have an elevated CMV PCR of 8160 log 3.91 on 8/15/2018.  He was started on valganciclovir 900 mg PO BID; and initially his viral load decreased. However, CMV PCR in 12/2018 demonstrated elevating levels, so patient was referred to ID, Dr. Lowry. Patient was noted to have UL97 mutation and was a candidate for a clinic trial study with maribavir. Clinical trial was started on the week of 12/18/2018 and was well tolerated by patient. Viral load decreased to <35 on 1/10 and 1/18/2019; however, patient was noted to have an increasing CMV PCR level to 3500 on 2/6/2019.     Received first doses of IV Foscarnet Q8 with 500 cc NS bolus administered prior to medication yesteday.  Will continue administration today and await ID's decision on outpatient treatment regimen.  Will closely monitor CMP and electrolyte imbalances.   PICC consult placed for outpatient infusions.

## 2019-02-13 NOTE — ASSESSMENT & PLAN NOTE
Patient was noted to have an elevated CMV PCR of 8160 log 3.91 on 8/15/2018.  He was started on valganciclovir 900 mg PO BID; and initially his viral load decreased. However, CMV PCR in 12/2018 demonstrated elevating levels, so patient was referred to ID, Dr. Lowry. Patient was noted to have UL97 mutation and was a candidate for a clinic trial study with maribavir. Clinical trial was started on the week of 12/18/2018 and was well tolerated by patient. Viral load decreased to <35 on 1/10 and 1/18/2019; however, patient was noted to have an increasing CMV PCR level to 3500 on 2/6/2019.     Will begin IV Foscarnet BID with 500 cc NS bolus to be administered prior to medication. Will closely monitor CMP and electrolyte imbalances.   PICC consult placed as patient may require outpatient infusions.

## 2019-02-13 NOTE — PROGRESS NOTES
[facility]  Lung Transplant  Progress Note - Floor    Patient Name: Martin Hendrix Jr.  MRN: [id number]  Admission Date: 2/12/2019  Hospital Length of Stay: 1 days  Post-Operative Day: 540  Attending Physician: Darrick Wolfe MD  Primary Care Provider: Sukhi Dunham Jr, MD     Subjective:     Interval History: No acute events overnight.  Tolerated Foscarnet yesterday.      Continuous Infusions:  Scheduled Meds:   amLODIPine  5 mg Oral Daily    aspirin  81 mg Oral Daily    calcium-vitamin D3  1 tablet Oral BID    enoxaparin  40 mg Subcutaneous Daily    famotidine  20 mg Oral BID    fluticasone  1 spray Each Nare Daily    folic acid  1,000 mcg Oral Daily    foscarnet (FOSCAVIR) IVPB (peripheral)  60 mg/kg (Order-Specific) Intravenous Q8H    insulin aspart U-100  10 Units Subcutaneous TIDWM    insulin detemir U-100  18 Units Subcutaneous Daily    magnesium oxide  400 mg Oral BID    pravastatin  20 mg Oral Daily    predniSONE  10 mg Oral Daily    sodium chloride 0.9%  500 mL Intravenous Q8H    sodium chloride 0.9%  10 mL Intravenous Q6H    sulfamethoxazole-trimethoprim 800-160mg  1 tablet Oral Every Mon, Wed, Fri    tacrolimus  1.5 mg Oral BID     PRN Meds:acetaminophen, dextrose 50%, dextrose 50%, glucagon (human recombinant), glucose, glucose, insulin aspart U-100, levalbuterol, magnesium sulfate IVPB **AND** magnesium sulfate IVPB, ondansetron, potassium chloride 10% **AND** potassium chloride 10% **AND** potassium chloride 10%, promethazine (PHENERGAN) IVPB, Flushing PICC Protocol **AND** sodium chloride 0.9% **AND** sodium chloride 0.9%    Review of patient's allergies indicates:  No Known Allergies    Review of Systems   Constitutional: Negative for appetite change, chills, diaphoresis and fever.   HENT: Negative for congestion, postnasal drip, rhinorrhea, sinus pain and voice change.    Eyes: Negative for discharge and redness.   Respiratory: Negative for cough, shortness  of breath and wheezing.    Cardiovascular: Negative for chest pain, palpitations and leg swelling.   Gastrointestinal: Negative for abdominal distention, abdominal pain, constipation, diarrhea, nausea and vomiting.   Endocrine: Negative for polydipsia and polyuria.   Genitourinary: Negative for dysuria, frequency and urgency.   Musculoskeletal: Negative for arthralgias, back pain, myalgias and neck pain.   Skin: Negative for pallor and rash.   Neurological: Negative for dizziness, light-headedness and headaches.   Psychiatric/Behavioral: Negative for agitation, confusion and hallucinations.     Objective:   Physical Exam   Constitutional: He is oriented to person, place, and time. He appears well-developed and well-nourished. No distress.   HENT:   Head: Normocephalic and atraumatic.   Right Ear: External ear normal.   Left Ear: External ear normal.   Nose: Nose normal.   Mouth/Throat: Oropharynx is clear and moist.   Eyes: Conjunctivae and EOM are normal. Right eye exhibits no discharge. Left eye exhibits no discharge.   Neck: Normal range of motion. Neck supple.   Cardiovascular: Normal rate, regular rhythm and normal heart sounds. Exam reveals no gallop and no friction rub.   No murmur heard.  Pulmonary/Chest: Effort normal. He has no wheezes. He has no rhonchi. He has no rales.   Well-healed mid-line sternotomy incision   Abdominal: Soft. He exhibits no distension. There is no tenderness.   Musculoskeletal: Normal range of motion. He exhibits no edema.   Neurological: He is alert and oriented to person, place, and time.   Skin: Skin is warm and dry. He is not diaphoretic. No erythema.   Psychiatric: He has a normal mood and affect. His behavior is normal.   Nursing note and vitals reviewed.        Vital Signs (Most Recent):  Temp: 98.2 °F (36.8 °C) (02/13/19 0800)  Pulse: 93 (02/13/19 0800)  Resp: 15 (02/13/19 0800)  BP: (!) 153/100 (02/13/19 0800)  SpO2: 99 % (02/13/19 0800) Vital Signs (24h Range):  Temp:   [98.2 °F (36.8 °C)] 98.2 °F (36.8 °C)  Pulse:  [75-95] 93  Resp:  [15-23] 15  SpO2:  [96 %-99 %] 99 %  BP: (119-153)/() 153/100     Weight: 102 kg (224 lb 13.9 oz)  Body mass index is 31.36 kg/m².      Intake/Output Summary (Last 24 hours) at 2/13/2019 0951  Last data filed at 2/13/2019 0822  Gross per 24 hour   Intake 2280 ml   Output 2180 ml   Net 100 ml       Significant Labs:  CBC:  Recent Labs   Lab 02/13/19  0636   WBC 5.16   RBC 4.39*   HGB 12.8*   HCT 41.3      MCV 94   MCH 29.2   MCHC 31.0*     BMP:  Recent Labs   Lab 02/13/19  0636      K 4.2      CO2 26   BUN 14   CREATININE 0.8   CALCIUM 9.8      Tacrolimus Levels:  No results for input(s): TACROLIMUS in the last 72 hours.  Microbiology:  Microbiology Results (last 7 days)     Procedure Component Value Units Date/Time    Respiratory Viral Panel by PCR [facility]; Nasal Swab [[id number]] Collected:  02/12/19 2024    Order Status:  Sent Specimen:  Respiratory Updated:  02/12/19 2029          I have reviewed all pertinent labs within the past 24 hours.        Assessment/Plan:     * Cytomegalovirus (CMV) viremia    Patient was noted to have an elevated CMV PCR of 8160 log 3.91 on 8/15/2018.  He was started on valganciclovir 900 mg PO BID; and initially his viral load decreased. However, CMV PCR in 12/2018 demonstrated elevating levels, so patient was referred to ID, Dr. Lowry. Patient was noted to have UL97 mutation and was a candidate for a clinic trial study with maribavir. Clinical trial was started on the week of 12/18/2018 and was well tolerated by patient. Viral load decreased to <35 on 1/10 and 1/18/2019; however, patient was noted to have an increasing CMV PCR level to 3500 on 2/6/2019.     Received first doses of IV Foscarnet Q8 with 500 cc NS bolus administered prior to medication yesteday.  Will continue administration today and await ID's decision on outpatient treatment regimen.  Will closely monitor CMP and electrolyte  imbalances.   PICC consult placed for outpatient infusions.      Lung replaced by transplant    Patient is 17 months s/p bilateral lung transplant secondary to diagnosis of sarcoidosis with associated pulmonary hypertension. 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      Will continue current immunosuppression and prophylaxis. Most recent bronchoscopies in 08/2018 and 11/2018 negative for rejection. RVP is pending..      Prophylactic antibiotic    Continue Bactrim DS every MWF for PCP/PJP prophylaxis.      Immunosuppression    Continue tacrolimus 1.5 mg BID. Will monitor daily tacrolimus levels and dose adjust accordingly.     Patient has remained off of MMF therapy due to active CMV infection.    Previously on a prednisone taper. Will continue prednisone 10 mg daily for now, but will likely decrease dose to previous 5 mg soon.      Type 2 diabetes mellitus with hyperglycemia, with long-term current use of insulin    Poorly controlled with HgbA1c of 10.9 upon admission.     Endocrine consulted, appreciate recs. Will need continued follow up with endocrinology as outpatient.    Inpatient consult to RD placed for education on diabetic diet.          Irwin Celestin NP  Lung Transplant  [facility]

## 2019-02-13 NOTE — CONSULTS
Double lumen PICC place to right brachial vein.  43 cm in length, 30 cm arm circumference and 0 cm exposed.   Lot #[id number].

## 2019-02-13 NOTE — PLAN OF CARE
Insurance Discharge planner left ,   Comment   SW/CM--Please contact Pamela @ [facility] for any discharge planning needs  [phone]

## 2019-02-13 NOTE — ASSESSMENT & PLAN NOTE
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 He is stable on IV foscarnet.    - would continue foscarnet with infusions  - would check daily BMP with Mg for monitoring of nephrotoxicity and electrolytes  - repeat quant due 2/19/2019

## 2019-02-13 NOTE — NURSING
Pt arrived to unit, settled in room 48862. VSS. PA notified. PT being admitted for Foscarnet infusions. R f/a 20g placed. JACKIE.

## 2019-02-13 NOTE — CONSULTS
"  [facility]  Adult Nutrition  Consult Note    SUMMARY     Recommendations    Recommendation/Intervention: 1. Continue diabetic diet.  Goals: 1. Pt to consume >75% meals.  Nutrition Goal Status: new  Communication of RD Recs: (POC)    Reason for Assessment    Reason For Assessment: consult  Diagnosis: diabetes diagnosis/complications(uncontrolled diabetes)  Relevant Medical History: BLTx 10/2017 2/2 sarcoidosis/pulmonary HTN, HLD, DM2  Interdisciplinary Rounds: attended  General Information Comments: Pt with A1c 10.9%. Pt appears well nourished and reports good intake of meals. Per chart, pt's blood glucose was well-controlled earlier in the year. Per pt, he did not like the fact that he began gaining weight on insulin, and he became lazy about taking his insulin. Stressed importance of insulin compliance to patient. Discussed diet and carb intake; pt makes healthy lunches for work and is able to follow a healthier diet much of the time. Discussed strategies for reminding himself to take his insulin.  Nutrition Discharge Planning: Pt to be more compliant with insulin at home.    Nutrition Risk Screen    Nutrition Risk Screen: no indicators present    Nutrition/Diet History    Spiritual, Cultural Beliefs, Gnosticist Practices, Values that Affect Care: no    Anthropometrics    Temp: 98.3 °F (36.8 °C)  Height: 5' 11" (180.3 cm)  Height (inches): 71 in  Weight Method: Standard Scale  Weight: 102 kg (224 lb 13.9 oz)  Weight (lb): 224.87 lb  Ideal Body Weight (IBW), Male: 172 lb  % Ideal Body Weight, Male (lb): 130.74 lb  BMI (Calculated): 31.4       Lab/Procedures/Meds    Pertinent Labs Comments: Glu 184, Mag 1.3, A1c 10.9%, Glu 184  Pertinent Medications Comments: famotidine, calcium/vitamin D, aspart/detemir, magnesium oxide, statin, prograf    Estimated/Assessed Needs    Weight Used For Calorie Calculations: 102 kg (224 lb 13.9 oz)  Energy Calorie Requirements (kcal): 2340 kcal  Energy Need Method: " Fiorella Margo(PAL 1.25)  Protein Requirements: 102-123g  Weight Used For Protein Calculations: 102 kg (224 lb 13.9 oz)  RDA Method (mL): 2340  CHO Requirement: 50% total kcal      Nutrition Prescription Ordered    Current Diet Order: diabetic 2000 kcal    Evaluation of Received Nutrient/Fluid Intake    Comments: LBM 2/12  % Intake of Estimated Energy Needs: 75 - 100 %  % Meal Intake: 75 - 100 %    Nutrition Risk    Level of Risk/Frequency of Follow-up: low     Assessment and Plan    Nutrition Problem  Altered nutrition related lab values    Related to (etiology):   Uncontrolled diabetes    Signs and Symptoms (as evidenced by):   A1c 10.9, pt admits skipping insulin doses    Interventions/Recommendations (treatment strategy):  Collaboration of care.  Educated pt on importance of taking insulin, healthy diet with consistent carbs.    Nutrition Diagnosis Status:   New      Monitor and Evaluation    Food and Nutrient Intake: energy intake, food and beverage intake  Food and Nutrient Adminstration: diet order  Knowledge/Beliefs/Attitudes: food and nutrition knowledge/skill  Anthropometric Measurements: weight, weight change, body mass index  Biochemical Data, Medical Tests and Procedures: electrolyte and renal panel, gastrointestinal profile, glucose/endocrine profile, inflammatory profile, lipid profile  Nutrition-Focused Physical Findings: overall appearance     Malnutrition Assessment       Pt does not meet criteria for malnutrition at this time.    Nutrition Follow-Up    RD Follow-up?: Yes

## 2019-02-13 NOTE — SUBJECTIVE & OBJECTIVE
Past Medical History:   Diagnosis Date    AVILA (acute kidney injury) 8/27/2017    Atrial fibrillation 8/23/2017    On home oxygen therapy     KRISTYN on CPAP     Osteopenia     Pancreatitis 2009    hospitalized    Pulmonary hypertension     Pure hypercholesterolemia 11/15/2017    Sarcoidosis     Shortness of breath     Type 2 diabetes mellitus 11/5/2017       Past Surgical History:   Procedure Laterality Date    ABDOMINAL SURGERY      6 weeks old    BRONCHOSCOPY      CHEST TUBE INSERTION      CHOLECYSTECTOMY      COLONOSCOPY      EGD (ESOPHAGOGASTRODUODENOSCOPY) N/A 8/6/2018    Performed by Ramu Eisenberg MD at [facility] ENDO (2ND FLR)    flexible bronchoscopy with tissue biopsy N/A 3/21/2018    Performed by [facility] Diagnostic Provider at [facility] OR 2ND FLR    flexible bronchoscopy with tissue biopsy CPT 85860 N/A 8/22/2018    Performed by [facility] Diagnostic Provider at [facility] OR 2ND FLR    flexible bronchoscopy with tissue biopsy CPT 92853 N/A 4/19/2018    Performed by [facility] Diagnostic Provider at [facility] OR 2ND FLR    flexible bronchoscopy with tissue biopsy CPT 05077 N/A 2/21/2018    Performed by [facility] Diagnostic Provider at [facility] OR 2ND FLR    flexible bronchoscopy with tissue biopsy CPT 61199 N/A 12/20/2017    Performed by [facility] Diagnostic Provider at [facility] OR 2ND FLR    flexible bronchoscopy with tissue biopsy CPT 63354 N/A 11/22/2017    Performed by [facility] Diagnostic Provider at [facility] OR 2ND FLR    flexible bronchoscopy with tissue biopsy CPT 76940 N/A 11/2/2017    Performed by [facility] Diagnostic Provider at [facility] OR 2ND FLR    flexible bronchoscopy with tissue biopsy CPT 64672 N/A 10/4/2017    Performed by [facility] Diagnostic Provider at [facility] OR 2ND FLR    flexible bronchoscopy with tissue biopy CPT 72307 N/A 11/20/2018    Performed by [facility] Diagnostic Provider at [facility] OR 2ND FLR    HEART CATH-RIGHT Right 5/22/2017    Performed by Shanell Higuera MD at [facility] CATH LAB    LUNG BIOPSY      LUNG TRANSPLANT  08/2017     "PH MONITORING, ESOPHAGUS, WIRELESS, (OFF REFLUX MEDS) N/A 8/6/2018    Performed by [facility]berg MD at [facility] ENDO (2ND FLR)    TRANSPLANT-LUNG Bilateral 8/22/2017    Performed by Paulo German MD at [facility] OR 2ND FLR       Review of patient's allergies indicates:  No Known Allergies    Medications:  Medications Prior to Admission   Medication Sig    amLODIPine (NORVASC) 5 MG tablet Take 1 tablet (5 mg total) by mouth once daily.    CALCIUM 600 + D,3, 600 mg(1,500mg) -200 unit Tab TAKE 1 TABLET BY MOUTH TWICE DAILY    ECOTRIN LOW STRENGTH 81 mg EC tablet TAKE 1 TABLET DAILY    famotidine (PEPCID) 20 MG tablet TAKE 1 TABLET TWICE A DAY    fluticasone (FLONASE) 50 mcg/actuation nasal spray 1 spray by Each Nare route once daily.    folic acid (FOLVITE) 1 MG tablet TAKE 1 TABLET DAILY    insulin degludec (TRESIBA FLEXTOUCH U-200) 200 unit/mL (3 mL) InPn Inject 20 Units into the skin every evening.    insulin lispro (HUMALOG KWIKPEN INSULIN) 100 unit/mL InPn pen Inject 14 Units into the skin 3 (three) times daily before meals. + correction scale TDD: 60 units    lancets Misc To check BG 4 times daily, to use with insurance preferred meter    magnesium oxide (MAG-OX) 400 mg (241.3 mg magnesium) tablet Take 1 tablet (400 mg total) by mouth 2 (two) times daily.    magnesium oxide (MAG-OX) 400 mg (241.3 mg magnesium) tablet TAKE 1 TABLET BY MOUTH TWICE DAILY    metFORMIN (GLUCOPHAGE-XR) 500 MG 24 hr tablet TAKE 2 TABLETS BY MOUTH TWICE DAILY WITH MEALS    multivitamin (THERAGRAN) per tablet Take 1 tablet by mouth once daily.    ONETOUCH VERIO Strp USE TO CHECK BLOOD GLUCOSE FOUR TIMES A DAY, TO USE WITH INSURANCE PREFERRED METER    pen needle, diabetic (BD ULTRA-FINE JIM PEN NEEDLES) 32 gauge x 5/32" Ndle Uses 4 times daily, on multiple daily insulin injections    pravastatin (PRAVACHOL) 20 MG tablet TAKE 1 TABLET BY MOUTH ONCE DAILY    predniSONE (DELTASONE) 10 MG tablet TAKE 3 TABLETS FOR 21 DAYS, 2 " EVERY DAY FOR 60 DAYS, TAKE 1 1/2 FOR 30 DAYS, 1 TABLET FOR 60 DAYS, THEN 1/2 TABLET THEREAFTER    sulfamethoxazole-trimethoprim 800-160mg (BACTRIM DS) 800-160 mg Tab Take 1 tablet by mouth every Mon, Wed, Fri.    tacrolimus (PROGRAF) 0.5 MG Cap Take 3 capsules (1.5 mg total) by mouth every 12 (twelve) hours.    furosemide (LASIX) 40 MG tablet Take 1 tablet (40 mg total) by mouth daily as needed.    [] INV JOJ305 tablet Take 2 tablets (400 mg total) by mouth every 12 (twelve) hours. Take two tablets (400 mg total) by mouth every 12 hours. FOR INVESTIGATIONAL USE ONLY. Patient Study # #:[id number]. for 10 days     Antibiotics (From admission, onward)    Start     Stop Route Frequency Ordered    19 1700  sulfamethoxazole-trimethoprim 800-160mg per tablet 1 tablet      -- Oral Every Mon, Wed, Fri 19 1639        Antifungals (From admission, onward)    None        Antivirals (From admission, onward)        Stop Route Frequency     foscarnet (FOSCAVIR) IVPB (central line)      -- IV Every 8 hours (non-standard times)           Immunization History   Administered Date(s) Administered    Hepatitis A, Adult 2017, 2018    Hepatitis B, Dialysis, 3 Dose 2017, 08/10/2017    Pneumococcal Polysaccharide - 23 Valent 2018       Family History     Problem Relation (Age of Onset)    Diabetes Father, Brother    Hypertension Mother    Kidney disease Sister        Social History     Socioeconomic History    Marital status:      Spouse name: None    Number of children: None    Years of education: None    Highest education level: None   Social Needs    Financial resource strain: None    Food insecurity - worry: None    Food insecurity - inability: None    Transportation needs - medical: None    Transportation needs - non-medical: None   Occupational History    None   Tobacco Use    Smoking status: Never Smoker    Smokeless tobacco: Never Used   Substance and Sexual  Activity    Alcohol use: No    Drug use: No    Sexual activity: None   Other Topics Concern    None   Social History Narrative    None     Review of Systems   Constitutional: Negative for appetite change, chills, diaphoresis and fever.   HENT: Negative for congestion, postnasal drip, rhinorrhea, sinus pain and voice change.    Eyes: Negative for discharge and redness.   Respiratory: Negative for cough, shortness of breath and wheezing.    Cardiovascular: Negative for chest pain, palpitations and leg swelling.   Gastrointestinal: Negative for abdominal distention, abdominal pain, constipation, diarrhea, nausea and vomiting.   Endocrine: Negative for polydipsia and polyuria.   Genitourinary: Negative for dysuria, frequency and urgency.   Musculoskeletal: Negative for arthralgias, back pain, myalgias and neck pain.   Skin: Negative for pallor and rash.   Neurological: Negative for dizziness, light-headedness and headaches.   Psychiatric/Behavioral: Negative for agitation, confusion and hallucinations.     Objective:     Vital Signs (Most Recent):  Temp: 98 °F (36.7 °C) (02/13/19 1532)  Pulse: 93 (02/13/19 0800)  Resp: 15 (02/13/19 0800)  BP: (!) 153/100 (02/13/19 0800)  SpO2: 99 % (02/13/19 0800) Vital Signs (24h Range):  Temp:  [98 °F (36.7 °C)-98.3 °F (36.8 °C)] 98 °F (36.7 °C)  Pulse:  [75-95] 93  Resp:  [15-23] 15  SpO2:  [96 %-99 %] 99 %  BP: (119-153)/() 153/100     Weight: 102 kg (224 lb 13.9 oz)  Body mass index is 31.36 kg/m².    Estimated Creatinine Clearance: 125.4 mL/min (based on SCr of 0.8 mg/dL).    Physical Exam   Constitutional: He is oriented to person, place, and time. He appears well-developed and well-nourished. No distress.   HENT:   Head: Normocephalic and atraumatic.   Right Ear: External ear normal.   Left Ear: External ear normal.   Nose: Nose normal.   Mouth/Throat: Oropharynx is clear and moist.   Eyes: Conjunctivae and EOM are normal. Right eye exhibits no discharge. Left eye  exhibits no discharge.   Neck: Normal range of motion. Neck supple.   Cardiovascular: Normal rate, regular rhythm and normal heart sounds. Exam reveals no gallop and no friction rub.   No murmur heard.  Pulmonary/Chest: Effort normal. He has no wheezes. He has no rhonchi. He has no rales.   Well-healed mid-line sternotomy incision   Abdominal: Soft. He exhibits no distension. There is no tenderness.   Musculoskeletal: Normal range of motion. He exhibits no edema.   Neurological: He is alert and oriented to person, place, and time.   Skin: Skin is warm and dry. He is not diaphoretic. No erythema.   Psychiatric: He has a normal mood and affect. His behavior is normal.   Nursing note and vitals reviewed.      Significant Labs:   CBC:   Recent Labs   Lab 02/12/19 1840 02/13/19  0636   WBC 6.47 5.16   HGB 11.2* 12.8*   HCT 34.1* 41.3    218     CMP:   Recent Labs   Lab 02/12/19 1840 02/13/19  0636     136 138   K 4.3  4.3 4.2     101 102   CO2 27  27 26   *  267* 184*   BUN 18  18 14   CREATININE 1.1  1.1 0.8   CALCIUM 9.4  9.4 9.8   PROT 7.0 7.2   ALBUMIN 3.8 3.7   BILITOT 0.5 0.6   ALKPHOS 86 85   AST 22 26   ALT 26 26   ANIONGAP 8  8 10   EGFRNONAA >60.0  >60.0 >60.0       Significant Imaging: I have reviewed all pertinent imaging results/findings within the past 24 hours.

## 2019-02-13 NOTE — SUBJECTIVE & OBJECTIVE
Past Medical History:   Diagnosis Date    AVILA (acute kidney injury) 8/27/2017    Atrial fibrillation 8/23/2017    On home oxygen therapy     KRISTYN on CPAP     Osteopenia     Pancreatitis 2009    hospitalized    Pulmonary hypertension     Pure hypercholesterolemia 11/15/2017    Sarcoidosis     Shortness of breath     Type 2 diabetes mellitus 11/5/2017       Past Surgical History:   Procedure Laterality Date    ABDOMINAL SURGERY      6 weeks old    BRONCHOSCOPY      CHEST TUBE INSERTION      CHOLECYSTECTOMY      COLONOSCOPY      EGD (ESOPHAGOGASTRODUODENOSCOPY) N/A 8/6/2018    Performed by Ramu Eisenberg MD at [facility] ENDO (2ND FLR)    flexible bronchoscopy with tissue biopsy N/A 3/21/2018    Performed by [facility] at [facility] OR 2ND FLR    flexible bronchoscopy with tissue biopsy CPT 91213 N/A 8/22/2018    [facility]formed [facility] OR 2ND FLR    flexible bronchoscopy with tissue biopsy CPT 33107 N/A 4/19/2018    Performed by [facility] at [facility] OR 2ND FLR    flexible bronchoscopy with tissue biopsy CPT 46783 N/A 2/21/2018    Performed by [facility] at [facility] OR 2ND FLR    flexible bronchoscopy with tissue biopsy CPT 18459 N/A 12/20/2017    [facility]formed [facility]ND FLR    flexible bronchoscopy with tissue biopsy CPT 00005 N/A 11/22/2017    Performed [facility] OR 2ND FLR    flexible bronchoscopy with tissue biopsy CPT 97311 N/A 11/2/2017    [facility]formed [facility] OR 2ND FLR    flexible bronchoscopy with tissue biopsy CPT 10785 N/A 10/4/2017    Performed [facility] OR 2ND FLR    flexible bronchoscopy with tissue biopy CPT 14500 N/A 11/20/2018    Performed by [facility] OR 2ND FLR    HEART CATH-RIGHT Right 5/22/2017    Performed by Shanell Higuera MD at [facility] CATH LAB    LUNG BIOPSY      LUNG TRANSPLANT  08/2017     "PH MONITORING, ESOPHAGUS, WIRELESS, (OFF REFLUX MEDS) N/A 8/6/2018    Performed by [facility] at [facility] ENDO (2ND FLR)    TRANSPLANT-LUNG Bilateral 8/22/2017    Performed by Paulo German MD at [facility] OR 2ND FLR       Review of patient's allergies indicates:  No Known Allergies    PTA Medications   Medication Sig    amLODIPine (NORVASC) 5 MG tablet Take 1 tablet (5 mg total) by mouth once daily.    CALCIUM 600 + D,3, 600 mg(1,500mg) -200 unit Tab TAKE 1 TABLET BY MOUTH TWICE DAILY    ECOTRIN LOW STRENGTH 81 mg EC tablet TAKE 1 TABLET DAILY    famotidine (PEPCID) 20 MG tablet TAKE 1 TABLET TWICE A DAY    fluticasone (FLONASE) 50 mcg/actuation nasal spray 1 spray by Each Nare route once daily.    folic acid (FOLVITE) 1 MG tablet TAKE 1 TABLET DAILY    insulin degludec (TRESIBA FLEXTOUCH U-200) 200 unit/mL (3 mL) InPn Inject 20 Units into the skin every evening.    insulin lispro (HUMALOG KWIKPEN INSULIN) 100 unit/mL InPn pen Inject 14 Units into the skin 3 (three) times daily before meals. + correction scale TDD: 60 units    lancets Misc To check BG 4 times daily, to use with insurance preferred meter    magnesium oxide (MAG-OX) 400 mg (241.3 mg magnesium) tablet Take 1 tablet (400 mg total) by mouth 2 (two) times daily.    magnesium oxide (MAG-OX) 400 mg (241.3 mg magnesium) tablet TAKE 1 TABLET BY MOUTH TWICE DAILY    metFORMIN (GLUCOPHAGE-XR) 500 MG 24 hr tablet TAKE 2 TABLETS BY MOUTH TWICE DAILY WITH MEALS    multivitamin (THERAGRAN) per tablet Take 1 tablet by mouth once daily.    ONETOUCH VERIO Strp USE TO CHECK BLOOD GLUCOSE FOUR TIMES A DAY, TO USE WITH INSURANCE PREFERRED METER    pen needle, diabetic (BD ULTRA-FINE JIM PEN NEEDLES) 32 gauge x 5/32" Ndle Uses 4 times daily, on multiple daily insulin injections    pravastatin (PRAVACHOL) 20 MG tablet TAKE 1 TABLET BY MOUTH ONCE DAILY    predniSONE (DELTASONE) 10 MG tablet TAKE 3 TABLETS FOR 21 DAYS, 2 EVERY DAY FOR 60 DAYS, TAKE " 1 1/2 FOR 30 DAYS, 1 TABLET FOR 60 DAYS, THEN 1/2 TABLET THEREAFTER    sulfamethoxazole-trimethoprim 800-160mg (BACTRIM DS) 800-160 mg Tab Take 1 tablet by mouth every Mon, Wed, Fri.    tacrolimus (PROGRAF) 0.5 MG Cap Take 3 capsules (1.5 mg total) by mouth every 12 (twelve) hours.    furosemide (LASIX) 40 MG tablet Take 1 tablet (40 mg total) by mouth daily as needed.    [] INV [id number] tablet Take 2 tablets (400 mg total) by mouth every 12 (twelve) hours. Take two tablets (400 mg total) by mouth every 12 hours. FOR INVESTIGATIONAL USE ONLY. Patient Study # #:[id number]. for 10 days     Family History     Problem Relation (Age of Onset)    Diabetes Father, Brother    Hypertension Mother    Kidney disease Sister        Tobacco Use    Smoking status: Never Smoker    Smokeless tobacco: Never Used   Substance and Sexual Activity    Alcohol use: No    Drug use: No    Sexual activity: Not on file     Review of Systems   Constitutional: Negative for appetite change, chills, diaphoresis and fever.   HENT: Positive for congestion and voice change. Negative for postnasal drip, rhinorrhea and sinus pain.    Eyes: Negative for discharge and redness.   Respiratory: Positive for cough. Negative for shortness of breath and wheezing.    Cardiovascular: Negative for chest pain, palpitations and leg swelling.   Gastrointestinal: Negative for abdominal distention, abdominal pain, constipation, diarrhea, nausea and vomiting.   Endocrine: Negative for polydipsia and polyuria.   Genitourinary: Negative for dysuria, frequency and urgency.   Musculoskeletal: Negative for arthralgias, back pain, myalgias and neck pain.   Skin: Negative for pallor and rash.   Neurological: Negative for dizziness, light-headedness and headaches.   Psychiatric/Behavioral: Negative for agitation, confusion and hallucinations.     Objective:     Vital Signs (Most Recent):  Temp: 98.2 °F (36.8 °C) (19 173)  Pulse: 95 (19)  Resp:  (!) 23 (02/12/19 1730)  BP: (!) 142/87 (02/12/19 1730)  SpO2: 96 % (02/12/19 1730) Vital Signs (24h Range):  Temp:  [98.2 °F (36.8 °C)] 98.2 °F (36.8 °C)  Pulse:  [95] 95  Resp:  [23] 23  SpO2:  [96 %] 96 %  BP: (142)/(87) 142/87     Weight: 102 kg (224 lb 13.9 oz)  Body mass index is 31.36 kg/m².    No intake or output data in the 24 hours ending 02/12/19 1959    Physical Exam   Constitutional: He is oriented to person, place, and time. He appears well-developed and well-nourished. No distress.   HENT:   Head: Normocephalic and atraumatic.   Right Ear: External ear normal.   Left Ear: External ear normal.   Nose: Nose normal.   Mouth/Throat: Oropharynx is clear and moist.   Eyes: Conjunctivae and EOM are normal. Right eye exhibits no discharge. Left eye exhibits no discharge.   Neck: Normal range of motion. Neck supple.   Cardiovascular: Normal rate, regular rhythm and normal heart sounds. Exam reveals no gallop and no friction rub.   No murmur heard.  Pulmonary/Chest: Effort normal. He has no wheezes. He has no rhonchi. He has no rales.   Well-healed mid-line sternotomy incision   Abdominal: Soft. He exhibits no distension. There is no tenderness.   Musculoskeletal: Normal range of motion. He exhibits no edema.   Neurological: He is alert and oriented to person, place, and time.   Skin: Skin is warm and dry. He is not diaphoretic. No erythema.   Psychiatric: He has a normal mood and affect. His behavior is normal.   Nursing note and vitals reviewed.      Significant Labs:  CBC:  Recent Labs   Lab 02/12/19  1840   WBC 6.47   RBC 3.77*   HGB 11.2*   HCT 34.1*      MCV 91   MCH 29.7   MCHC 32.8     BMP:  Recent Labs   Lab 02/12/19  1840     136   K 4.3  4.3     101   CO2 27  27   BUN 18  18   CREATININE 1.1  1.1   CALCIUM 9.4  9.4        I have reviewed all pertinent labs within the past 24 hours.    Diagnostic Results:  Labs: Reviewed

## 2019-02-13 NOTE — ASSESSMENT & PLAN NOTE
Poorly controlled with HgbA1c of 10.9 upon admission.     Endocrine consulted, appreciate recs. Will need continued follow up with endocrinology as outpatient.    Inpatient consult to RD placed for education on diabetic diet.

## 2019-02-13 NOTE — H&P
[facility]  Lung Transplant  H&P    Patient Name: Martin Hednrix Jr.  MRN: [id number]  Admission Date: 2/12/2019  Attending Physician: Darrick Wolfe MD  Primary Care Provider: Sukhi Dunham Jr, MD     Subjective:     History of Present Illness:  Mr. Martin Hendrix is a 59 YO male 17 months s/p bilateral lung transplant secondary to diagnosis of sarcoidosis with associated pulmonary hypertension. 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 Patient presents today as a direct admission for IV Foscarnet treatment.     Patient was noted to have an elevated CMV PCR of 8160 log 3.91 on 8/15/2018.  He was started on valganciclovir 900 mg PO BID; and initially his viral load decreased. However, CMV PCR in 12/2018 demonstrated elevating levels, so patient was referred to ID, Dr. Lowry. Patient was noted to have UL97 mutation and was a candidate for a clinic trial study with maribavir. Clinical trial was started on the week of 12/18/2018 and was well tolerated by patient. Viral load decreased to <35 on 1/10 and 1/18/2019; however, patient was noted to have an increasing CMV PCR level to 3500 on 2/6/2019.     Patient reports chronic, dry cough with no associated dyspnea. Patient does report having increased sinus congestion last week that is improving. Patient does note that he did have an associated productive cough with white sputum with the congestion last week. Patient denies any fevers, chills, myalgias, and sick contacts. Patient notes that his hoarseness fluctuates intermittently. Patient does report heartburn that is controlled on his current regimen of Pepcid. Patient also notes intermittent episodes of diarrhea that occur non-frequently. Denies any associated abdominal, nausea, or vomiting. Denies any chest pain or palpitations.     Past Medical History:   Diagnosis Date     AVILA (acute kidney injury) 8/27/2017    Atrial fibrillation 8/23/2017    On home oxygen therapy     KRISTYN on CPAP     Osteopenia     Pancreatitis 2009    hospitalized    Pulmonary hypertension     Pure hypercholesterolemia 11/15/2017    Sarcoidosis     Shortness of breath     Type 2 diabetes mellitus 11/5/2017       Past Surgical History:   Procedure Laterality Date    ABDOMINAL SURGERY      6 weeks old    BRONCHOSCOPY      CHEST TUBE INSERTION      CHOLECYSTECTOMY      COLONOSCOPY      EGD (ESOPHAGOGASTRODUODENOSCOPY) N/A 8/6/2018    Performed by Ramu Eisenberg MD at [facility] ENDO (2ND FLR)    flexible bronchoscopy with tissue biopsy N/A 3/21/2018    Performed by [facility] Provider at [facility] OR 2ND FLR    flexible bronchoscopy with tissue biopsy CPT 84491 N/A 8/22/2018    Performed by [facility] OR 2ND FLR    flexible bronchoscopy with tissue biopsy CPT 98626 N/A 4/19/2018    Performed by [facility] at [facility] OR 2ND FLR    flexible bronchoscopy with tissue biopsy CPT 36958 N/A 2/21/2018    Performed by [facility] at [facility] OR 2ND FLR    flexible bronchoscopy with tissue biopsy CPT 96663 N/A 12/20/2017    Performed by [facility] at [facility] OR 2ND FLR    flexible bronchoscopy with tissue biopsy CPT 86793 N/A 11/22/2017    Performed by [facility] at [facility] OR 2ND FLR    flexible bronchoscopy with tissue biopsy CPT 74356 N/A 11/2/2017    Performed by [facility] at [facility] OR 2ND FLR    flexible bronchoscopy with tissue biopsy CPT 02382 N/A 10/4/2017    Performed by [facility] at [facility] OR 2ND FLR    flexible bronchoscopy with tissue biopy CPT 59579 N/A 11/20/2018    [facility]    HEART CATH-RIGHT Right 5/22/2017    Performed by Shanell Higuera MD at [facility] CATH LAB    LUNG BIOPSY      LUNG TRANSPLANT  08/2017    PH MONITORING, ESOPHAGUS, WIRELESS, (OFF  "REFLUX MEDS) N/A 8/6/2018    Performed by Ramu Eisenberg MD at [facility] ENDO (2ND FLR)    TRANSPLANT-LUNG Bilateral 8/22/2017    Performed by Paulo German MD at [facility] OR 2ND FLR       Review of patient's allergies indicates:  No Known Allergies    PTA Medications   Medication Sig    amLODIPine (NORVASC) 5 MG tablet Take 1 tablet (5 mg total) by mouth once daily.    CALCIUM 600 + D,3, 600 mg(1,500mg) -200 unit Tab TAKE 1 TABLET BY MOUTH TWICE DAILY    ECOTRIN LOW STRENGTH 81 mg EC tablet TAKE 1 TABLET DAILY    famotidine (PEPCID) 20 MG tablet TAKE 1 TABLET TWICE A DAY    fluticasone (FLONASE) 50 mcg/actuation nasal spray 1 spray by Each Nare route once daily.    folic acid (FOLVITE) 1 MG tablet TAKE 1 TABLET DAILY    insulin degludec (TRESIBA FLEXTOUCH U-200) 200 unit/mL (3 mL) InPn Inject 20 Units into the skin every evening.    insulin lispro (HUMALOG KWIKPEN INSULIN) 100 unit/mL InPn pen Inject 14 Units into the skin 3 (three) times daily before meals. + correction scale TDD: 60 units    lancets Misc To check BG 4 times daily, to use with insurance preferred meter    magnesium oxide (MAG-OX) 400 mg (241.3 mg magnesium) tablet Take 1 tablet (400 mg total) by mouth 2 (two) times daily.    magnesium oxide (MAG-OX) 400 mg (241.3 mg magnesium) tablet TAKE 1 TABLET BY MOUTH TWICE DAILY    metFORMIN (GLUCOPHAGE-XR) 500 MG 24 hr tablet TAKE 2 TABLETS BY MOUTH TWICE DAILY WITH MEALS    multivitamin (THERAGRAN) per tablet Take 1 tablet by mouth once daily.    ONETOUCH VERIO Strp USE TO CHECK BLOOD GLUCOSE FOUR TIMES A DAY, TO USE WITH INSURANCE PREFERRED METER    pen needle, diabetic (BD ULTRA-FINE JIM PEN NEEDLES) 32 gauge x 5/32" Ndle Uses 4 times daily, on multiple daily insulin injections    pravastatin (PRAVACHOL) 20 MG tablet TAKE 1 TABLET BY MOUTH ONCE DAILY    predniSONE (DELTASONE) 10 MG tablet TAKE 3 TABLETS FOR 21 DAYS, 2 EVERY DAY FOR 60 DAYS, TAKE 1 1/2 FOR 30 DAYS, 1 TABLET FOR 60 DAYS, " THEN 1/2 TABLET THEREAFTER    sulfamethoxazole-trimethoprim 800-160mg (BACTRIM DS) 800-160 mg Tab Take 1 tablet by mouth every Mon, Wed, Fri.    tacrolimus (PROGRAF) 0.5 MG Cap Take 3 capsules (1.5 mg total) by mouth every 12 (twelve) hours.    furosemide (LASIX) 40 MG tablet Take 1 tablet (40 mg total) by mouth daily as needed.    [] INV [id number] tablet Take 2 tablets (400 mg total) by mouth every 12 (twelve) hours. Take two tablets (400 mg total) by mouth every 12 hours. FOR INVESTIGATIONAL USE ONLY. Patient Study # #:[id number]. for 10 days     Family History     Problem Relation (Age of Onset)    Diabetes Father, Brother    Hypertension Mother    Kidney disease Sister        Tobacco Use    Smoking status: Never Smoker    Smokeless tobacco: Never Used   Substance and Sexual Activity    Alcohol use: No    Drug use: No    Sexual activity: Not on file     Review of Systems   Constitutional: Negative for appetite change, chills, diaphoresis and fever.   HENT: Positive for congestion and voice change. Negative for postnasal drip, rhinorrhea and sinus pain.    Eyes: Negative for discharge and redness.   Respiratory: Positive for cough. Negative for shortness of breath and wheezing.    Cardiovascular: Negative for chest pain, palpitations and leg swelling.   Gastrointestinal: Negative for abdominal distention, abdominal pain, constipation, diarrhea, nausea and vomiting.   Endocrine: Negative for polydipsia and polyuria.   Genitourinary: Negative for dysuria, frequency and urgency.   Musculoskeletal: Negative for arthralgias, back pain, myalgias and neck pain.   Skin: Negative for pallor and rash.   Neurological: Negative for dizziness, light-headedness and headaches.   Psychiatric/Behavioral: Negative for agitation, confusion and hallucinations.     Objective:     Vital Signs (Most Recent):  Temp: 98.2 °F (36.8 °C) (19)  Pulse: 95 (19)  Resp: (!) 23 (19)  BP: (!) 142/87  (02/12/19 1730)  SpO2: 96 % (02/12/19 1730) Vital Signs (24h Range):  Temp:  [98.2 °F (36.8 °C)] 98.2 °F (36.8 °C)  Pulse:  [95] 95  Resp:  [23] 23  SpO2:  [96 %] 96 %  BP: (142)/(87) 142/87     Weight: 102 kg (224 lb 13.9 oz)  Body mass index is 31.36 kg/m².    No intake or output data in the 24 hours ending 02/12/19 1959    Physical Exam   Constitutional: He is oriented to person, place, and time. He appears well-developed and well-nourished. No distress.   HENT:   Head: Normocephalic and atraumatic.   Right Ear: External ear normal.   Left Ear: External ear normal.   Nose: Nose normal.   Mouth/Throat: Oropharynx is clear and moist.   Eyes: Conjunctivae and EOM are normal. Right eye exhibits no discharge. Left eye exhibits no discharge.   Neck: Normal range of motion. Neck supple.   Cardiovascular: Normal rate, regular rhythm and normal heart sounds. Exam reveals no gallop and no friction rub.   No murmur heard.  Pulmonary/Chest: Effort normal. He has no wheezes. He has no rhonchi. He has no rales.   Well-healed mid-line sternotomy incision   Abdominal: Soft. He exhibits no distension. There is no tenderness.   Musculoskeletal: Normal range of motion. He exhibits no edema.   Neurological: He is alert and oriented to person, place, and time.   Skin: Skin is warm and dry. He is not diaphoretic. No erythema.   Psychiatric: He has a normal mood and affect. His behavior is normal.   Nursing note and vitals reviewed.      Significant Labs:  CBC:  Recent Labs   Lab 02/12/19  1840   WBC 6.47   RBC 3.77*   HGB 11.2*   HCT 34.1*      MCV 91   MCH 29.7   MCHC 32.8     BMP:  Recent Labs   Lab 02/12/19  1840     136   K 4.3  4.3     101   CO2 27  27   BUN 18  18   CREATININE 1.1  1.1   CALCIUM 9.4  9.4        I have reviewed all pertinent labs within the past 24 hours.    Diagnostic Results:  Labs: Reviewed    Assessment/Plan:     * Cytomegalovirus (CMV) viremia    Patient was noted to have an  elevated CMV PCR of 8160 log 3.91 on 8/15/2018.  He was started on valganciclovir 900 mg PO BID; and initially his viral load decreased. However, CMV PCR in 12/2018 demonstrated elevating levels, so patient was referred to ID, Dr. Lowry. Patient was noted to have UL97 mutation and was a candidate for a clinic trial study with maribavir. Clinical trial was started on the week of 12/18/2018 and was well tolerated by patient. Viral load decreased to <35 on 1/10 and 1/18/2019; however, patient was noted to have an increasing CMV PCR level to 3500 on 2/6/2019.     Will begin IV Foscarnet BID with 500 cc NS bolus to be administered prior to medication. Will closely monitor CMP and electrolyte imbalances.   PICC consult placed as patient may require outpatient infusions.      Lung replaced by transplant    Patient is 17 months s/p bilateral lung transplant secondary to diagnosis of sarcoidosis with associated pulmonary hypertension. 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      Will continue current immunosuppression and prophylaxis. Most recent bronchoscopies in 08/2018 and 11/2018 negative for rejection. CMV BAL elevated on 08/2018 bronchoscopy.     Will order RVP to rule out possible previous viral infection as patient continued to report nasal congestion upon admission.      Immunosuppression    Continue tacrolimus 1.5 mg BID. Will monitor daily tacrolimus levels and dose adjust accordingly.     Patient has remained off of MMF therapy due to active CMV infection.    Previously on a prednisone taper. Will order prednisone 10 mg daily but will likely decrease dose to previous 5 mg dose tomorrow.      Prophylactic antibiotic    Continue Bactrim DS every MWF for PCP/PJP prophylaxis.      Type 2 diabetes mellitus with hyperglycemia, with long-term current use of insulin    Poorly controlled with HgbA1c  of 10.9 upon admission.     Endocrine consulted, appreciate recs. Will need continued follow up with endocrinology as outpatient.    Inpatient consult to RD placed for education on diabetic diet.          Patricia Fowler PA-C  Lung Transplant  [facility]

## 2019-02-13 NOTE — SUBJECTIVE & OBJECTIVE
Subjective:     Interval History: No acute events overnight.  Tolerated Foscarnet yesterday.      Continuous Infusions:  Scheduled Meds:   amLODIPine  5 mg Oral Daily    aspirin  81 mg Oral Daily    calcium-vitamin D3  1 tablet Oral BID    enoxaparin  40 mg Subcutaneous Daily    famotidine  20 mg Oral BID    fluticasone  1 spray Each Nare Daily    folic acid  1,000 mcg Oral Daily    foscarnet (FOSCAVIR) IVPB (peripheral)  60 mg/kg (Order-Specific) Intravenous Q8H    insulin aspart U-100  10 Units Subcutaneous TIDWM    insulin detemir U-100  18 Units Subcutaneous Daily    magnesium oxide  400 mg Oral BID    pravastatin  20 mg Oral Daily    predniSONE  10 mg Oral Daily    sodium chloride 0.9%  500 mL Intravenous Q8H    sodium chloride 0.9%  10 mL Intravenous Q6H    sulfamethoxazole-trimethoprim 800-160mg  1 tablet Oral Every Mon, Wed, Fri    tacrolimus  1.5 mg Oral BID     PRN Meds:acetaminophen, dextrose 50%, dextrose 50%, glucagon (human recombinant), glucose, glucose, insulin aspart U-100, levalbuterol, magnesium sulfate IVPB **AND** magnesium sulfate IVPB, ondansetron, potassium chloride 10% **AND** potassium chloride 10% **AND** potassium chloride 10%, promethazine (PHENERGAN) IVPB, Flushing PICC Protocol **AND** sodium chloride 0.9% **AND** sodium chloride 0.9%    Review of patient's allergies indicates:  No Known Allergies    Review of Systems   Constitutional: Negative for appetite change, chills, diaphoresis and fever.   HENT: Negative for congestion, postnasal drip, rhinorrhea, sinus pain and voice change.    Eyes: Negative for discharge and redness.   Respiratory: Negative for cough, shortness of breath and wheezing.    Cardiovascular: Negative for chest pain, palpitations and leg swelling.   Gastrointestinal: Negative for abdominal distention, abdominal pain, constipation, diarrhea, nausea and vomiting.   Endocrine: Negative for polydipsia and polyuria.   Genitourinary: Negative for  dysuria, frequency and urgency.   Musculoskeletal: Negative for arthralgias, back pain, myalgias and neck pain.   Skin: Negative for pallor and rash.   Neurological: Negative for dizziness, light-headedness and headaches.   Psychiatric/Behavioral: Negative for agitation, confusion and hallucinations.     Objective:   Physical Exam   Constitutional: He is oriented to person, place, and time. He appears well-developed and well-nourished. No distress.   HENT:   Head: Normocephalic and atraumatic.   Right Ear: External ear normal.   Left Ear: External ear normal.   Nose: Nose normal.   Mouth/Throat: Oropharynx is clear and moist.   Eyes: Conjunctivae and EOM are normal. Right eye exhibits no discharge. Left eye exhibits no discharge.   Neck: Normal range of motion. Neck supple.   Cardiovascular: Normal rate, regular rhythm and normal heart sounds. Exam reveals no gallop and no friction rub.   No murmur heard.  Pulmonary/Chest: Effort normal. He has no wheezes. He has no rhonchi. He has no rales.   Well-healed mid-line sternotomy incision   Abdominal: Soft. He exhibits no distension. There is no tenderness.   Musculoskeletal: Normal range of motion. He exhibits no edema.   Neurological: He is alert and oriented to person, place, and time.   Skin: Skin is warm and dry. He is not diaphoretic. No erythema.   Psychiatric: He has a normal mood and affect. His behavior is normal.   Nursing note and vitals reviewed.        Vital Signs (Most Recent):  Temp: 98.2 °F (36.8 °C) (02/13/19 0800)  Pulse: 93 (02/13/19 0800)  Resp: 15 (02/13/19 0800)  BP: (!) 153/100 (02/13/19 0800)  SpO2: 99 % (02/13/19 0800) Vital Signs (24h Range):  Temp:  [98.2 °F (36.8 °C)] 98.2 °F (36.8 °C)  Pulse:  [75-95] 93  Resp:  [15-23] 15  SpO2:  [96 %-99 %] 99 %  BP: (119-153)/() 153/100     Weight: 102 kg (224 lb 13.9 oz)  Body mass index is 31.36 kg/m².      Intake/Output Summary (Last 24 hours) at 2/13/2019 0951  Last data filed at 2/13/2019  0822  Gross per 24 hour   Intake 2280 ml   Output 2180 ml   Net 100 ml       Significant Labs:  CBC:  Recent Labs   Lab 02/13/19  0636   WBC 5.16   RBC 4.39*   HGB 12.8*   HCT 41.3      MCV 94   MCH 29.2   MCHC 31.0*     BMP:  Recent Labs   Lab 02/13/19  0636      K 4.2      CO2 26   BUN 14   CREATININE 0.8   CALCIUM 9.8      Tacrolimus Levels:  No results for input(s): TACROLIMUS in the last 72 hours.  Microbiology:  Microbiology Results (last 7 days)     Procedure Component Value Units Date/Time    Respiratory Viral Panel by PCR [facility]; Nasal Swab [[id number]] Collected:  02/12/19 2024    Order Status:  Sent Specimen:  Respiratory Updated:  02/12/19 2029          I have reviewed all pertinent labs within the past 24 hours.

## 2019-02-13 NOTE — HPI
Mr. 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 As an outpatient, he became undetectable after 4wks of therapy in mid 1/2019 but ultimately was noted to have rising quantitative CMV PCR's (3500 on 2/6/2019), prompting admission for standard of care therapy with foscarnet now. He notes a recent URI that resolved but currently denies recent F/C/S, cough, SOB, N/V, anorexia, diarrhea, or abdominal pain. He had tolerated the first infusions of this agent

## 2019-02-13 NOTE — PLAN OF CARE
Problem: Adult Inpatient Plan of Care  Goal: Plan of Care Review  Outcome: Ongoing (interventions implemented as appropriate)  Pt AAOx4, VSS, afebrile.  Currently denies c/o pain.  1st dose of IV Foscarnet administered.  Pt tolerated well.   CXR results pending.  Blood sugar elevated throughout shift..  Pt did not call for accucheck before dinner tray arrival.  MTI only administered.  Fall risk precautions initiated.  Pt in lowest bed position setting, lighting adjusted, pt to wear nonskid socks when ambulating, side rails up x2.  Pt remain free from falls during shift.  Pt verbalize understanding to call when needed assistance. Call light within reach.  Will continue to monitor.

## 2019-02-13 NOTE — ASSESSMENT & PLAN NOTE
Patient is 17 months s/p bilateral lung transplant secondary to diagnosis of sarcoidosis with associated pulmonary hypertension. 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      Will continue current immunosuppression and prophylaxis. Most recent bronchoscopies in 08/2018 and 11/2018 negative for rejection. Will order RVP to rule out possible previous viral infection as patient continued to report nasal congestion upon admission.

## 2019-02-13 NOTE — CONSULTS
[facility]  Infectious Disease  Consult Note    Patient Name: Martin Hendrix Jr.  MRN: [id number]  Admission Date: 2/12/2019  Hospital Length of Stay: 1 days  Attending Physician: Darrick Wolfe MD  Primary Care Provider: Sukhi Dunham Jr, MD     Isolation Status: No active isolations    Patient information was obtained from patient and past medical records.      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 He is stable on IV foscarnet.    - would continue foscarnet with infusions  - would check daily BMP with Mg for monitoring of nephrotoxicity and electrolytes  - repeat quant due 2/19/2019         Thank you for your consult. I will follow-up with patient. Please contact us if you have any additional questions.     Merline Dobson MD  Transplant ID Attending  [id number]    Merline Dobson MD  Infectious Disease  [facility]    Subjective:     Principal Problem: Cytomegalovirus (CMV) viremia    HPI: Mr. Hendrix is a 57yo man w/a history of sarcoidosis (c/b pulmonary HTN and ESLD; s/p BOLT 8/22/2017, CMV D+/R-, basiliximab induction, on maintenance tacro/MMF/pred; c/b left vocal cord dysfunction, prolonged ACR-2 s/p pulse SM in 10/2017 and thymo x3 in 3/2018, CMV infection  8/2018 c/b UL-97 resistance enrolled in maribavir treatment study) who was admitted on 2/12/2019 for treatment failure on maribavir for initiation of foscarnet induction. As an outpatient, he became undetectable after 4wks of therapy in mid 1/2019 but ultimately was noted to have rising quantitative CMV PCR's (3500 on 2/6/2019), prompting admission for standard of care therapy with foscarnet now. He notes a recent URI that resolved but currently denies recent F/C/S, cough, SOB, N/V, anorexia, diarrhea, or abdominal pain. He has tolerated the first infusions of this agent well overnight.    Past Medical History:   Diagnosis Date    AVILA (acute kidney injury) 8/27/2017    Atrial fibrillation 8/23/2017    On home oxygen therapy     KRISTYN on CPAP     Osteopenia     Pancreatitis 2009    hospitalized    Pulmonary hypertension     Pure hypercholesterolemia 11/15/2017    Sarcoidosis     Shortness of breath     Type 2 diabetes mellitus 11/5/2017       Past Surgical History:   Procedure Laterality Date    ABDOMINAL SURGERY      6 weeks old    BRONCHOSCOPY      CHEST TUBE INSERTION      CHOLECYSTECTOMY      COLONOSCOPY      EGD (ESOPHAGOGASTRODUODENOSCOPY) N/A 8/6/2018    Performed by [facility]berg MD at [facility] ENDO (2ND FLR)    flexible bronchoscopy with tissue biopsy N/A 3/21/2018    Performed by [facility] at [facility] OR 2ND FLR    flexible bronchoscopy with tissue biopsy CPT 14007 N/A 8/22/2018    Performed by [facility] OR 2ND FLR    flexible bronchoscopy with tissue biopsy CPT 78105 N/A 4/19/2018    Performed by [facility] OR 2ND FLR    flexible bronchoscopy with tissue biopsy CPT 36384 N/A 2/21/2018    Performed by [facility]ND FLR    flexible bronchoscopy with tissue biopsy CPT 05469 N/A 12/20/2017    Performed by [facility] OR 2ND FLR    flexible bronchoscopy with tissue biopsy CPT 43770 N/A  11/22/2017    Performed by [facility] at [facility] OR 2ND FLR    flexible bronchoscopy with tissue biopsy CPT 84218 N/A 11/2/2017    Performed by [facility] OR 2ND FLR    flexible bronchoscopy with tissue biopsy CPT 02350 N/A 10/4/2017    Performed by [facility] 2ND FLR    flexible bronchoscopy with tissue biopy CPT 07822 N/A 11/20/2018    Performed by [facility] OR 2ND FLR    HEART CATH-RIGHT Right 5/22/2017    Performed by Shanell Higuera MD at [facility] CATH LAB    LUNG BIOPSY      LUNG TRANSPLANT  08/2017    PH MONITORING, ESOPHAGUS, WIRELESS, (OFF REFLUX MEDS) N/A 8/6/2018    Performed by Ramu Eisenberg MD at [facility] ENDO (2ND FLR)    TRANSPLANT-LUNG Bilateral 8/22/2017    Performed by Paulo German MD at [facility] OR 2ND FLR       Review of patient's allergies indicates:  No Known Allergies    Medications:  Medications Prior to Admission   Medication Sig    amLODIPine (NORVASC) 5 MG tablet Take 1 tablet (5 mg total) by mouth once daily.    CALCIUM 600 + D,3, 600 mg(1,500mg) -200 unit Tab TAKE 1 TABLET BY MOUTH TWICE DAILY    ECOTRIN LOW STRENGTH 81 mg EC tablet TAKE 1 TABLET DAILY    famotidine (PEPCID) 20 MG tablet TAKE 1 TABLET TWICE A DAY    fluticasone (FLONASE) 50 mcg/actuation nasal spray 1 spray by Each Nare route once daily.    folic acid (FOLVITE) 1 MG tablet TAKE 1 TABLET DAILY    insulin degludec (TRESIBA FLEXTOUCH U-200) 200 unit/mL (3 mL) InPn Inject 20 Units into the skin every evening.    insulin lispro (HUMALOG KWIKPEN INSULIN) 100 unit/mL InPn pen Inject 14 Units into the skin 3 (three) times daily before meals. + correction scale TDD: 60 units    lancets Misc To check BG 4 times daily, to use with insurance preferred meter    magnesium oxide (MAG-OX) 400 mg (241.3 mg magnesium) tablet Take 1 tablet (400 mg total) by mouth 2 (two) times daily.    magnesium oxide (MAG-OX) 400 mg (241.3 mg magnesium)  "tablet TAKE 1 TABLET BY MOUTH TWICE DAILY    metFORMIN (GLUCOPHAGE-XR) 500 MG 24 hr tablet TAKE 2 TABLETS BY MOUTH TWICE DAILY WITH MEALS    multivitamin (THERAGRAN) per tablet Take 1 tablet by mouth once daily.    ONETOUCH VERIO Strp USE TO CHECK BLOOD GLUCOSE FOUR TIMES A DAY, TO USE WITH INSURANCE PREFERRED METER    pen needle, diabetic (BD ULTRA-FINE JIM PEN NEEDLES) 32 gauge x 5/32" Ndle Uses 4 times daily, on multiple daily insulin injections    pravastatin (PRAVACHOL) 20 MG tablet TAKE 1 TABLET BY MOUTH ONCE DAILY    predniSONE (DELTASONE) 10 MG tablet TAKE 3 TABLETS FOR 21 DAYS, 2 EVERY DAY FOR 60 DAYS, TAKE 1 1/2 FOR 30 DAYS, 1 TABLET FOR 60 DAYS, THEN 1/2 TABLET THEREAFTER    sulfamethoxazole-trimethoprim 800-160mg (BACTRIM DS) 800-160 mg Tab Take 1 tablet by mouth every Mon, Wed, Fri.    tacrolimus (PROGRAF) 0.5 MG Cap Take 3 capsules (1.5 mg total) by mouth every 12 (twelve) hours.    furosemide (LASIX) 40 MG tablet Take 1 tablet (40 mg total) by mouth daily as needed.    [] INV DTU067 tablet Take 2 tablets (400 mg total) by mouth every 12 (twelve) hours. Take two tablets (400 mg total) by mouth every 12 hours. FOR INVESTIGATIONAL USE ONLY. Patient Study # #:[id number]. for 10 days     Antibiotics (From admission, onward)    Start     Stop Route Frequency Ordered    19 1700  sulfamethoxazole-trimethoprim 800-160mg per tablet 1 tablet      -- Oral Every Mon, Wed, 19 1639        Antifungals (From admission, onward)    None        Antivirals (From admission, onward)        Stop Route Frequency     foscarnet (FOSCAVIR) IVPB (central line)      -- IV Every 8 hours (non-standard times)           Immunization History   Administered Date(s) Administered    Hepatitis A, Adult 2017, 2018    Hepatitis B, Dialysis, 3 Dose 2017, 08/10/2017    Pneumococcal Polysaccharide - 23 Valent 2018       Family History     Problem Relation (Age of Onset)    Diabetes " Father, Brother    Hypertension Mother    Kidney disease Sister        Social History     Socioeconomic History    Marital status:      Spouse name: None    Number of children: None    Years of education: None    Highest education level: None   Social Needs    Financial resource strain: None    Food insecurity - worry: None    Food insecurity - inability: None    Transportation needs - medical: None    Transportation needs - non-medical: None   Occupational History    None   Tobacco Use    Smoking status: Never Smoker    Smokeless tobacco: Never Used   Substance and Sexual Activity    Alcohol use: No    Drug use: No    Sexual activity: None   Other Topics Concern    None   Social History Narrative    None     Review of Systems   Constitutional: Negative for appetite change, chills, diaphoresis and fever.   HENT: Negative for congestion, postnasal drip, rhinorrhea, sinus pain and voice change.    Eyes: Negative for discharge and redness.   Respiratory: Negative for cough, shortness of breath and wheezing.    Cardiovascular: Negative for chest pain, palpitations and leg swelling.   Gastrointestinal: Negative for abdominal distention, abdominal pain, constipation, diarrhea, nausea and vomiting.   Endocrine: Negative for polydipsia and polyuria.   Genitourinary: Negative for dysuria, frequency and urgency.   Musculoskeletal: Negative for arthralgias, back pain, myalgias and neck pain.   Skin: Negative for pallor and rash.   Neurological: Negative for dizziness, light-headedness and headaches.   Psychiatric/Behavioral: Negative for agitation, confusion and hallucinations.     Objective:     Vital Signs (Most Recent):  Temp: 98 °F (36.7 °C) (02/13/19 1532)  Pulse: 93 (02/13/19 0800)  Resp: 15 (02/13/19 0800)  BP: (!) 153/100 (02/13/19 0800)  SpO2: 99 % (02/13/19 0800) Vital Signs (24h Range):  Temp:  [98 °F (36.7 °C)-98.3 °F (36.8 °C)] 98 °F (36.7 °C)  Pulse:  [75-95] 93  Resp:  [15-23] 15  SpO2:   [96 %-99 %] 99 %  BP: (119-153)/() 153/100     Weight: 102 kg (224 lb 13.9 oz)  Body mass index is 31.36 kg/m².    Estimated Creatinine Clearance: 125.4 mL/min (based on SCr of 0.8 mg/dL).    Physical Exam   Constitutional: He is oriented to person, place, and time. He appears well-developed and well-nourished. No distress.   HENT:   Head: Normocephalic and atraumatic.   Right Ear: External ear normal.   Left Ear: External ear normal.   Nose: Nose normal.   Mouth/Throat: Oropharynx is clear and moist.   Eyes: Conjunctivae and EOM are normal. Right eye exhibits no discharge. Left eye exhibits no discharge.   Neck: Normal range of motion. Neck supple.   Cardiovascular: Normal rate, regular rhythm and normal heart sounds. Exam reveals no gallop and no friction rub.   No murmur heard.  Pulmonary/Chest: Effort normal. He has no wheezes. He has no rhonchi. He has no rales.   Well-healed mid-line sternotomy incision   Abdominal: Soft. He exhibits no distension. There is no tenderness.   Musculoskeletal: Normal range of motion. He exhibits no edema.   Neurological: He is alert and oriented to person, place, and time.   Skin: Skin is warm and dry. He is not diaphoretic. No erythema.   Psychiatric: He has a normal mood and affect. His behavior is normal.   Nursing note and vitals reviewed.      Significant Labs:   CBC:   Recent Labs   Lab 02/12/19  1840 02/13/19  0636   WBC 6.47 5.16   HGB 11.2* 12.8*   HCT 34.1* 41.3    218     CMP:   Recent Labs   Lab 02/12/19  1840 02/13/19  0636     136 138   K 4.3  4.3 4.2     101 102   CO2 27  27 26   *  267* 184*   BUN 18  18 14   CREATININE 1.1  1.1 0.8   CALCIUM 9.4  9.4 9.8   PROT 7.0 7.2   ALBUMIN 3.8 3.7   BILITOT 0.5 0.6   ALKPHOS 86 85   AST 22 26   ALT 26 26   ANIONGAP 8  8 10   EGFRNONAA >60.0  >60.0 >60.0       Significant Imaging: I have reviewed all pertinent imaging results/findings within the past 24 hours.

## 2019-02-13 NOTE — PROCEDURES
"Martin Hendrix Jr. is a 58 y.o. male patient.    Temp: 98.2 °F (36.8 °C) (02/13/19 0800)  Pulse: 93 (02/13/19 0800)  Resp: 15 (02/13/19 0800)  BP: (!) 153/100 (02/13/19 0800)  SpO2: 99 % (02/13/19 0800)  Weight: 102 kg (224 lb 13.9 oz) (02/13/19 0400)  Height: 5' 11" (180.3 cm) (02/12/19 1657)    PICC  Date/Time: 2/13/2019 9:48 AM  Performed by: Maral Benites RN  Assisting provider: Meir Singh RN  Consent Done: Yes  Time out: Immediately prior to procedure a time out was called to verify the correct patient, procedure, equipment, support staff and site/side marked as required  Indications: med administration and vascular access  Anesthesia: local infiltration  Local anesthetic: lidocaine 1% without epinephrine  Anesthetic Total (mL): 3  Description of findings: PICC  Preparation: skin prepped with ChloraPrep  Skin prep agent dried: skin prep agent completely dried prior to procedure  Sterile barriers: all five maximum sterile barriers used - cap, mask, sterile gown, sterile gloves, and large sterile sheet  Hand hygiene: hand hygiene performed prior to central venous catheter insertion  Location details: right brachial  Catheter type: double lumen  Catheter size: 5 Fr  Catheter Length: 43cm    Ultrasound guidance: yes  Vessel Caliber: large and patent, compressibility normal  Vascular Doppler: not done  Needle advanced into vessel with real time Ultrasound guidance.  Guidewire confirmed in vessel.  Image recorded and saved.  Sterile sheath used.  Number of attempts: 1  Post-procedure: blood return through all ports, chlorhexidine patch and sterile dressing applied  Specimens: No  Implants: No  Assessment: placement verified by x-ray  Complications: none          Meir Singh  2/13/2019  "

## 2019-02-14 DIAGNOSIS — E83.39 HYPOPHOSPHATEMIA: Primary | ICD-10-CM

## 2019-02-14 DIAGNOSIS — E83.42 HYPOMAGNESEMIA: ICD-10-CM

## 2019-02-14 DIAGNOSIS — Z94.2 LUNG REPLACED BY TRANSPLANT: ICD-10-CM

## 2019-02-14 LAB
ALBUMIN SERPL BCP-MCNC: 3.6 G/DL
ALP SERPL-CCNC: 78 U/L
ALT SERPL W/O P-5'-P-CCNC: 27 U/L
ANION GAP SERPL CALC-SCNC: 9 MMOL/L
AST SERPL-CCNC: 20 U/L
BASOPHILS # BLD AUTO: 0.05 K/UL
BASOPHILS NFR BLD: 0.7 %
BILIRUB SERPL-MCNC: 0.6 MG/DL
BUN SERPL-MCNC: 11 MG/DL
CALCIUM SERPL-MCNC: 9.6 MG/DL
CHLORIDE SERPL-SCNC: 102 MMOL/L
CMV DNA SERPL NAA+PROBE-ACNC: 9810 IU/ML
CO2 SERPL-SCNC: 30 MMOL/L
CREAT SERPL-MCNC: 0.9 MG/DL
DIFFERENTIAL METHOD: ABNORMAL
ENTEROVIRUS: NOT DETECTED
EOSINOPHIL # BLD AUTO: 0.2 K/UL
EOSINOPHIL NFR BLD: 3.2 %
ERYTHROCYTE [DISTWIDTH] IN BLOOD BY AUTOMATED COUNT: 14.3 %
EST. GFR  (AFRICAN AMERICAN): >60 ML/MIN/1.73 M^2
EST. GFR  (NON AFRICAN AMERICAN): >60 ML/MIN/1.73 M^2
GLUCOSE SERPL-MCNC: 132 MG/DL
HCT VFR BLD AUTO: 37 %
HGB BLD-MCNC: 12 G/DL
HUMAN BOCAVIRUS: NOT DETECTED
HUMAN CORONAVIRUS, COMMON COLD VIRUS: NOT DETECTED
IMM GRANULOCYTES # BLD AUTO: 0.04 K/UL
IMM GRANULOCYTES NFR BLD AUTO: 0.6 %
INFLUENZA A - H1N1-09: NOT DETECTED
LYMPHOCYTES # BLD AUTO: 1.2 K/UL
LYMPHOCYTES NFR BLD: 15.9 %
MAGNESIUM SERPL-MCNC: 1.4 MG/DL
MCH RBC QN AUTO: 29.4 PG
MCHC RBC AUTO-ENTMCNC: 32.4 G/DL
MCV RBC AUTO: 91 FL
MONOCYTES # BLD AUTO: 0.8 K/UL
MONOCYTES NFR BLD: 10.7 %
NEUTROPHILS # BLD AUTO: 5 K/UL
NEUTROPHILS NFR BLD: 68.9 %
NRBC BLD-RTO: 0 /100 WBC
PARAINFLUENZA: NOT DETECTED
PLATELET # BLD AUTO: 282 K/UL
PMV BLD AUTO: 8.8 FL
POCT GLUCOSE: 171 MG/DL (ref 70–110)
POCT GLUCOSE: 184 MG/DL (ref 70–110)
POCT GLUCOSE: 256 MG/DL (ref 70–110)
POTASSIUM SERPL-SCNC: 3.9 MMOL/L
PRE FEV1 FVC: 79
PRE FEV1: 2.44
PRE FVC: 3.09
PREDICTED FEV1 FVC: 80
PREDICTED FEV1: 3.68
PREDICTED FVC: 4.54
PROT SERPL-MCNC: 6.9 G/DL
RBC # BLD AUTO: 4.08 M/UL
RVP - ADENOVIRUS: NOT DETECTED
RVP - HUMAN METAPNEUMOVIRUS (HMPV): NOT DETECTED
RVP - INFLUENZA A: NOT DETECTED
RVP - INFLUENZA B: NOT DETECTED
RVP - RESPIRATORY SYNCTIAL VIRUS (RSV) A: NOT DETECTED
RVP - RESPIRATORY VIRAL PANEL, SOURCE: NORMAL
RVP - RHINOVIRUS: NOT DETECTED
SODIUM SERPL-SCNC: 141 MMOL/L
TACROLIMUS BLD-MCNC: 8.9 NG/ML
WBC # BLD AUTO: 7.22 K/UL

## 2019-02-14 PROCEDURE — 20600001 HC STEP DOWN PRIVATE ROOM

## 2019-02-14 PROCEDURE — 94010 BREATHING CAPACITY TEST: CPT | Performed by: INTERNAL MEDICINE

## 2019-02-14 PROCEDURE — 85025 COMPLETE CBC W/AUTO DIFF WBC: CPT

## 2019-02-14 PROCEDURE — 63600175 PHARM REV CODE 636 W HCPCS: Performed by: PHYSICIAN ASSISTANT

## 2019-02-14 PROCEDURE — 25000003 PHARM REV CODE 250: Performed by: INTERNAL MEDICINE

## 2019-02-14 PROCEDURE — 99233 SBSQ HOSP IP/OBS HIGH 50: CPT | Mod: ,,, | Performed by: INTERNAL MEDICINE

## 2019-02-14 PROCEDURE — S5571 INSULIN DISPOS PEN 3 ML: HCPCS | Performed by: NURSE PRACTITIONER

## 2019-02-14 PROCEDURE — 99233 PR SUBSEQUENT HOSPITAL CARE,LEVL III: ICD-10-PCS | Mod: ,,, | Performed by: INTERNAL MEDICINE

## 2019-02-14 PROCEDURE — 63600175 PHARM REV CODE 636 W HCPCS: Performed by: NURSE PRACTITIONER

## 2019-02-14 PROCEDURE — 80053 COMPREHEN METABOLIC PANEL: CPT

## 2019-02-14 PROCEDURE — 83735 ASSAY OF MAGNESIUM: CPT

## 2019-02-14 PROCEDURE — 80197 ASSAY OF TACROLIMUS: CPT

## 2019-02-14 PROCEDURE — 63600175 PHARM REV CODE 636 W HCPCS: Mod: JG | Performed by: INTERNAL MEDICINE

## 2019-02-14 PROCEDURE — 94761 N-INVAS EAR/PLS OXIMETRY MLT: CPT

## 2019-02-14 PROCEDURE — 99232 SBSQ HOSP IP/OBS MODERATE 35: CPT | Mod: ,,, | Performed by: NURSE PRACTITIONER

## 2019-02-14 PROCEDURE — 25000003 PHARM REV CODE 250: Performed by: PHYSICIAN ASSISTANT

## 2019-02-14 PROCEDURE — 99232 PR SUBSEQUENT HOSPITAL CARE,LEVL II: ICD-10-PCS | Mod: ,,, | Performed by: NURSE PRACTITIONER

## 2019-02-14 RX ORDER — PREDNISONE 10 MG/1
10 TABLET ORAL DAILY
Qty: 30 TABLET | Refills: 11
Start: 2019-02-14 | End: 2019-02-14

## 2019-02-14 RX ORDER — PREDNISONE 5 MG/1
5 TABLET ORAL DAILY
Start: 2019-02-14 | End: 2019-06-21 | Stop reason: SDUPTHER

## 2019-02-14 RX ORDER — LANOLIN ALCOHOL/MO/W.PET/CERES
2 CREAM (GRAM) TOPICAL 2 TIMES DAILY
Qty: 120 TABLET | Refills: 11 | Status: SHIPPED | OUTPATIENT
Start: 2019-02-14 | End: 2019-02-19

## 2019-02-14 RX ORDER — LANOLIN ALCOHOL/MO/W.PET/CERES
800 CREAM (GRAM) TOPICAL 2 TIMES DAILY
Status: DISCONTINUED | OUTPATIENT
Start: 2019-02-14 | End: 2019-02-15 | Stop reason: HOSPADM

## 2019-02-14 RX ORDER — INSULIN ASPART 100 [IU]/ML
14 INJECTION, SOLUTION INTRAVENOUS; SUBCUTANEOUS
Status: DISCONTINUED | OUTPATIENT
Start: 2019-02-14 | End: 2019-02-15

## 2019-02-14 RX ADMIN — MAGNESIUM OXIDE TAB 400 MG (241.3 MG ELEMENTAL MG) 800 MG: 400 (241.3 MG) TAB at 08:02

## 2019-02-14 RX ADMIN — INSULIN ASPART 14 UNITS: 100 INJECTION, SOLUTION INTRAVENOUS; SUBCUTANEOUS at 05:02

## 2019-02-14 RX ADMIN — OYSTER SHELL CALCIUM WITH VITAMIN D 1 TABLET: 500; 200 TABLET, FILM COATED ORAL at 08:02

## 2019-02-14 RX ADMIN — INSULIN ASPART 3 UNITS: 100 INJECTION, SOLUTION INTRAVENOUS; SUBCUTANEOUS at 05:02

## 2019-02-14 RX ADMIN — AMLODIPINE BESYLATE 5 MG: 5 TABLET ORAL at 07:02

## 2019-02-14 RX ADMIN — FOSCARNET SODIUM 6000 MG: 24 INJECTION, SOLUTION INTRAVENOUS at 07:02

## 2019-02-14 RX ADMIN — MAGNESIUM SULFATE IN WATER 2 G: 40 INJECTION, SOLUTION INTRAVENOUS at 11:02

## 2019-02-14 RX ADMIN — INSULIN ASPART 12 UNITS: 100 INJECTION, SOLUTION INTRAVENOUS; SUBCUTANEOUS at 07:02

## 2019-02-14 RX ADMIN — TACROLIMUS 1.5 MG: 1 CAPSULE ORAL at 07:02

## 2019-02-14 RX ADMIN — FOSCARNET SODIUM 9000 MG: 24 INJECTION, SOLUTION INTRAVENOUS at 08:02

## 2019-02-14 RX ADMIN — INSULIN ASPART 14 UNITS: 100 INJECTION, SOLUTION INTRAVENOUS; SUBCUTANEOUS at 11:02

## 2019-02-14 RX ADMIN — FOLIC ACID 1000 MCG: 1 TABLET ORAL at 07:02

## 2019-02-14 RX ADMIN — FAMOTIDINE 20 MG: 20 TABLET ORAL at 08:02

## 2019-02-14 RX ADMIN — ASPIRIN 81 MG: 81 TABLET, COATED ORAL at 07:02

## 2019-02-14 RX ADMIN — TACROLIMUS 1.5 MG: 1 CAPSULE ORAL at 05:02

## 2019-02-14 RX ADMIN — MAGNESIUM OXIDE TAB 400 MG (241.3 MG ELEMENTAL MG) 400 MG: 400 (241.3 MG) TAB at 07:02

## 2019-02-14 RX ADMIN — SODIUM CHLORIDE 500 ML: 0.9 INJECTION, SOLUTION INTRAVENOUS at 08:02

## 2019-02-14 RX ADMIN — INSULIN ASPART 3 UNITS: 100 INJECTION, SOLUTION INTRAVENOUS; SUBCUTANEOUS at 08:02

## 2019-02-14 RX ADMIN — PREDNISONE 10 MG: 10 TABLET ORAL at 07:02

## 2019-02-14 RX ADMIN — OYSTER SHELL CALCIUM WITH VITAMIN D 1 TABLET: 500; 200 TABLET, FILM COATED ORAL at 07:02

## 2019-02-14 RX ADMIN — INSULIN DETEMIR 18 UNITS: 100 INJECTION, SOLUTION SUBCUTANEOUS at 07:02

## 2019-02-14 RX ADMIN — ENOXAPARIN SODIUM 40 MG: 100 INJECTION SUBCUTANEOUS at 05:02

## 2019-02-14 RX ADMIN — FLUTICASONE PROPIONATE 50 MCG: 50 SPRAY, METERED NASAL at 07:02

## 2019-02-14 RX ADMIN — MAGNESIUM SULFATE IN WATER 2 G: 40 INJECTION, SOLUTION INTRAVENOUS at 09:02

## 2019-02-14 RX ADMIN — SODIUM CHLORIDE 500 ML: 0.9 INJECTION, SOLUTION INTRAVENOUS at 07:02

## 2019-02-14 RX ADMIN — PRAVASTATIN SODIUM 20 MG: 20 TABLET ORAL at 07:02

## 2019-02-14 RX ADMIN — FAMOTIDINE 20 MG: 20 TABLET ORAL at 07:02

## 2019-02-14 NOTE — SUBJECTIVE & OBJECTIVE
Subjective:     Interval History: No acute events overnight.  Tolerating infusions    Continuous Infusions:  Scheduled Meds:   amLODIPine  5 mg Oral Daily    aspirin  81 mg Oral Daily    calcium-vitamin D3  1 tablet Oral BID    enoxaparin  40 mg Subcutaneous Daily    famotidine  20 mg Oral BID    fluticasone  1 spray Each Nare Daily    folic acid  1,000 mcg Oral Daily    foscarnet (FOSCAVIR) IVPB (peripheral)  90 mg/kg (Order-Specific) Intravenous Q12H    insulin aspart U-100  14 Units Subcutaneous TIDWM    insulin detemir U-100  18 Units Subcutaneous Daily    magnesium oxide  800 mg Oral BID    pravastatin  20 mg Oral Daily    predniSONE  10 mg Oral Daily    sodium chloride 0.9%  500 mL Intravenous Q12H    sodium chloride 0.9%  10 mL Intravenous Q6H    sulfamethoxazole-trimethoprim 800-160mg  1 tablet Oral Every Mon, Wed, Fri    tacrolimus  1.5 mg Oral BID     PRN Meds:acetaminophen, dextrose 50%, dextrose 50%, glucagon (human recombinant), glucose, glucose, insulin aspart U-100, levalbuterol, magnesium sulfate IVPB **AND** magnesium sulfate IVPB, ondansetron, potassium chloride 10% **AND** potassium chloride 10% **AND** potassium chloride 10%, promethazine (PHENERGAN) IVPB, Flushing PICC Protocol **AND** sodium chloride 0.9% **AND** sodium chloride 0.9%    Review of patient's allergies indicates:  No Known Allergies    Review of Systems   Constitutional: Negative for appetite change, chills, diaphoresis and fever.   HENT: Negative for congestion, postnasal drip, rhinorrhea, sinus pain and voice change.    Eyes: Negative for discharge and redness.   Respiratory: Negative for cough, shortness of breath and wheezing.    Cardiovascular: Negative for chest pain, palpitations and leg swelling.   Gastrointestinal: Negative for abdominal distention, abdominal pain, constipation, diarrhea, nausea and vomiting.   Endocrine: Negative for polydipsia and polyuria.   Genitourinary: Negative for dysuria,  frequency and urgency.   Musculoskeletal: Negative for arthralgias, back pain, myalgias and neck pain.   Skin: Negative for pallor and rash.   Neurological: Negative for dizziness, light-headedness and headaches.   Psychiatric/Behavioral: Negative for agitation, confusion and hallucinations.     Objective:   Physical Exam   Constitutional: He is oriented to person, place, and time. He appears well-developed and well-nourished. No distress.   HENT:   Head: Normocephalic and atraumatic.   Right Ear: External ear normal.   Left Ear: External ear normal.   Nose: Nose normal.   Mouth/Throat: Oropharynx is clear and moist.   Eyes: Conjunctivae and EOM are normal. Right eye exhibits no discharge. Left eye exhibits no discharge.   Neck: Normal range of motion. Neck supple.   Cardiovascular: Normal rate, regular rhythm and normal heart sounds. Exam reveals no gallop and no friction rub.   No murmur heard.  Pulmonary/Chest: Effort normal. He has no wheezes. He has no rhonchi. He has no rales.   Well-healed mid-line sternotomy incision   Abdominal: Soft. He exhibits no distension. There is no tenderness.   Musculoskeletal: Normal range of motion. He exhibits no edema.   Neurological: He is alert and oriented to person, place, and time.   Skin: Skin is warm and dry. He is not diaphoretic. No erythema.   Psychiatric: He has a normal mood and affect. His behavior is normal.   Nursing note and vitals reviewed.        Vital Signs (Most Recent):  Temp: 98.5 °F (36.9 °C) (02/14/19 1145)  Pulse: 81 (02/14/19 1145)  Resp: 19 (02/14/19 1145)  BP: (!) 132/95 (02/14/19 1145)  SpO2: 95 % (02/14/19 1145) Vital Signs (24h Range):  Temp:  [98 °F (36.7 °C)-98.6 °F (37 °C)] 98.5 °F (36.9 °C)  Pulse:  [77-91] 81  Resp:  [16-20] 19  SpO2:  [95 %-98 %] 95 %  BP: (123-157)/(85-97) 132/95     Weight: 102 kg (224 lb 13.9 oz)  Body mass index is 31.36 kg/m².      Intake/Output Summary (Last 24 hours) at 2/14/2019 1423  Last data filed at 2/14/2019  1200  Gross per 24 hour   Intake 4435 ml   Output 5200 ml   Net -765 ml       Significant Labs:  CBC:  Recent Labs   Lab 02/14/19  0600   WBC 7.22   RBC 4.08*   HGB 12.0*   HCT 37.0*      MCV 91   MCH 29.4   MCHC 32.4     BMP:  Recent Labs   Lab 02/14/19  0600      K 3.9      CO2 30*   BUN 11   CREATININE 0.9   CALCIUM 9.6      Tacrolimus Levels:  Recent Labs   Lab 02/14/19  0600   TACROLIMUS 8.9     Microbiology:  Microbiology Results (last 7 days)     Procedure Component Value Units Date/Time    Respiratory Viral Panel by PCR [facility]; Nasal Swab [[id number]] Collected:  02/12/19 2024    Order Status:  Completed Specimen:  Respiratory Updated:  02/14/19 1231     Respiratory Virus Panel, source Nasal Swab     RVP - Adenovirus Not Detected     Comment: Detects Serotypes B and E. Detection of Serotype C may   be limited. If Adenovirus infection is suspected and a   Not Detected result is returned the sample should be   re-tested for Adenovirus using an independent method  (e.g. [employer] Adenovirus Quantitative Real-Time  PCR test.          Enterovirus Not Detected     Comment: Cross-reactivity has been observed between certain Rhinovirus  strains and the Enterovirus assay.          Human Bocavirus Not Detected     Human Coronavirus Not Detected     Comment: The Human Coronavirus assay detects Human coronavirus types  229E, OC43,NL63 and HKU1.          RVP - Human Metapneumovirus (hMPV) Not Detected     RVP - Influenza A Not Detected     Influenza A - [id number] Not Detected     RVP - Influenza B Not Detected     Parainfluenza Not Detected     Respiratory Syncytial VirusVirus (RSV) A Not Detected     Comment: The Respiratory Syncytial Viral assay detects types A and B,  however it does not distinguish between the two.          RVP - Rhinovirus Not Detected     Comment: Cross-Reactivity has been observed between certain   Rhinovirus strains and the Enterovirus assay.  Target Enriched Mulitplex  Polymerase Chain Reaction (TEM-PCR)  allows for the detection of multiple pathogens out of a single  reaction.  This test was developed and its performance   characteristics determined by [employer].  It has not   been cleared or approved by the U.S.Food and Drug Administration.  Results should be used in conjunction with clinical findings,   and should not form the sole basis for a diagnosis or treatment  decision.  TEM-PCR is a licensed technology of [employer].         Narrative:       Receiving Lab:->[facility]          I have reviewed all pertinent labs within the past 24 hours.

## 2019-02-14 NOTE — PLAN OF CARE
Reviewed BG levels and insulin regimen.     Goal BG while inpatient: 140-180 mg/dl  Current BG levels are above goal    Currently receiving steroids for s/p lung transplant, causing elevated prandial BG levels.    Recommendations:   -Continue Levemir 18 units, daily  -Increase Novolog to 14 units AC  -Low dose correction  -POC AC/HS      Discharge Recommendations: TBD

## 2019-02-14 NOTE — PROGRESS NOTES
Patient to be discharged home today pending approval of Foscarnet for home administration.  Discharge instructions and outpatient plan of care as determined by Dr. Wolfe and Dr. Dobson with ID were reviewed with the patient as follows:  1.  Treatment for resistant CMV infection:  Foscarnet 9000 mg IV via PICC every 12 hours (0600 and 1800), with stop date to be determined based on lab results and ID provider recommendations.  Also prescribed is normal saline 0.9% 500cc IV bolus prior to each dose of Foscarnet.  Patient opted to use [employer] as his infusion provider as he has been satisfied with this company's services in the past.  Patient will self-infuse all IV medications. Explained that an infusion nurse liaison will meet with him to review the home infusion procedure prior to discharge.  2.  Labs twice weekly (every Tuesday - CMV PCR, CBC, BMP, Mg, PO4; every Friday - BMP, Mg, PO4).  Worked with patient to coordinate a lab schedule that accommodates his other medical appointments and work schedule. Written copy including dates and locations for all lab appointments provided to patient.  3.  Ongoing follow up with ID due to his participation in the [facility] Refractory or Resistant CMV trial - Clinic visits with ID provider BETO Almeida, and study labs were coordinated with patient's other lab appointments with patient's permission.  4.  Return to the lung transplant clinic for routine post-transplant care in ~ 3 months.  Appointments for 2/20/19 were cancelled per order from Irwin Celestin NP since patient had the ordered lab work, chest xray and spirometry done during this hospital stay. Informed patient he will be notified in the future about the date for his next appointments.  Provided verbal and written information re: this discharge plan.  Also reminded patient that all appointment information will be available in his [facility] account.  He asked questions, which were answered to his  satisfaction.  He verbalized his understanding of all information discussed and demonstrated his readiness for discharge.

## 2019-02-14 NOTE — PROGRESS NOTES
"[facility]  Endocrinology  Progress Note    Admit Date: 2019     Reason for Consult: Management of T2DM, Hyperglycemia     Surgical Procedure and Date: 2017 - Bilat. Lung Transplant    Diabetes diagnosis year:     Home Diabetes Medications:       Tresiba 20 units SQ Daily     Humalog 14 units SQ TIDWM     Metformin 500 mg PO BID    How often checking glucose at home? One   BG readings on regimen: 250's - 300's  Hypoglycemia on the regimen?  No  Missed doses on regimen?  Yes    Diabetes Complications include:     Hyperglycemia, Hypoglycemia  and Hypoglycemia unawareness    Complicating diabetes co morbidities:   KRISTYN and Glucocorticoid use , A-fib, AVILA, Pancreatitis, Osteopenia    Lab Results   Component Value Date    HGBA1C 8.5 (H) 10/04/2018       HPI:   Patient is a 58 y.o. male with a diagnosis of DM2, Scarcoidosis, KRISTYN, A-fib, AVILA, history of Pancreatitis, and osteopenia. [place] [facility] [place] related to [place] transplant received in . Patient endorses non-compliance with DM regimen. He is a patient of [facility]. Next appointment is scheduled for 2019. Endocrinology consulted to manage DM2/hyperglycemia.             Interval HPI:   Overnight events:     NAEON. BG is at or slightly above goal with prandial excursions noted with Prednisone 10 mg PO.     Eatin%  Nausea: No  Hypoglycemia and intervention: No  Fever: No  TPN and/or TF: No  If yes, type of TF/TPN and rate: None    BP (!) 139/97   Pulse 82   Temp 98.2 °F (36.8 °C) (Oral)   Resp 20   Ht 5' 11" (1.803 m)   Wt 102 kg (224 lb 13.9 oz)   SpO2 98%   BMI 31.36 kg/m²      Labs Reviewed and Include    Recent Labs   Lab 19  0636   *   CALCIUM 9.8   ALBUMIN 3.7   PROT 7.2      K 4.2   CO2 26      BUN 14   CREATININE 0.8   ALKPHOS 85   ALT 26   AST 26   BILITOT 0.6     Lab Results   Component Value Date    WBC 5.16 2019    HGB 12.8 (L) 2019    HCT " 41.3 02/13/2019    MCV 94 02/13/2019     02/13/2019     No results for input(s): TSH, FREET4 in the last 168 hours.  Lab Results   Component Value Date    HGBA1C 10.9 (H) 02/12/2019       Nutritional status:   Body mass index is 31.36 kg/m².  Lab Results   Component Value Date    ALBUMIN 3.7 02/13/2019    ALBUMIN 3.8 02/12/2019    ALBUMIN 3.7 01/18/2019     No results found for: PREALBUMIN    Estimated Creatinine Clearance: 125.4 mL/min (based on SCr of 0.8 mg/dL).    Accu-Checks  Recent Labs     02/12/19  1805 02/12/19  2003 02/12/19  2324 02/13/19  0730 02/13/19  1226 02/13/19  1748 02/13/19  2233   POCTGLUCOSE 255* 256* 250* 237* 229* 311* 180*       Current Medications and/or Treatments Impacting Glycemic Control  Immunotherapy:    Immunosuppressants         Stop Route Frequency     tacrolimus capsule 1.5 mg      -- Oral 2 times daily        Steroids:   Hormones (From admission, onward)    Start     Stop Route Frequency Ordered    02/13/19 0900  predniSONE tablet 10 mg      -- Oral Daily 02/12/19 1639        Pressors:    Autonomic Drugs (From admission, onward)    None        Hyperglycemia/Diabetes Medications:   Antihyperglycemics (From admission, onward)    Start     Stop Route Frequency Ordered    02/13/19 0900  insulin detemir U-100 pen 18 Units      -- SubQ Daily 02/12/19 1743    02/12/19 1842  insulin aspart U-100 pen 0-5 Units      -- SubQ Before meals & nightly PRN 02/12/19 1743    02/12/19 1745  insulin aspart U-100 pen 10 Units      -- SubQ 3 times daily with meals 02/12/19 1743          ASSESSMENT and PLAN    * Cytomegalovirus (CMV) viremia    Managed per primary team  Avoid hypoglycemia         Type 2 diabetes mellitus with hyperglycemia, with long-term current use of insulin    BG goal 140 - 180    Continue Levemir 18 units daily  Increase Novolog to 12 units TIDWM (prandial excursions likely due to steroid therapy).  BG monitoring AC/HS.     Discharge Recommendations:     Follow up with [facility]  Endocrinology clinic    Continue [facility] regimen.        Adrenal cortical steroids causing adverse effect in therapeutic use    On standard steroids per transplant team; may elevate BG readings         Immunosuppression    On standard steroids per transplant team; may elevate BG readings         Class 1 obesity with body mass index (BMI) of 30.0 to 30.9 in adult    may contribute to insulin resistance  Body mass index is 31.36 kg/m².             Delroy Vergara, NP  Endocrinology  [facility]

## 2019-02-14 NOTE — PLAN OF CARE
Problem: Adult Inpatient Plan of Care  Goal: Plan of Care Review  Outcome: Ongoing (interventions implemented as appropriate)  Pt AAOx4, VSS, afebrile.  Currently denies c/o pain.  IV Foscarnet administered.  Pt tolerated well.  Blood sugar 180 at bedtime no SSI administered.  Magnesium level 1.1.  IV magnesium administered per PRN order. Fall risk precautions initiated.  Pt in lowest bed position setting, lighting adjusted, pt to wear nonskid socks when ambulating, side rails up x2.  Pt remain free from falls during shift.  Pt verbalize understanding to call when needed assistance. Call light within reach.  Will continue to monitor.

## 2019-02-14 NOTE — PROGRESS NOTES
[facility]  Lung Transplant  Progress Note - Floor    Patient Name: Martin Hendrix Jr.  MRN: [id number]  Admission Date: 2/12/2019  Hospital Length of Stay: 2 days  Post-Operative Day: 541  Attending Physician: Darrick Wolfe MD  Primary Care Provider: Sukhi Dunham Jr, MD     Subjective:     Interval History: No acute events overnight.  Tolerating infusions    Continuous Infusions:  Scheduled Meds:   amLODIPine  5 mg Oral Daily    aspirin  81 mg Oral Daily    calcium-vitamin D3  1 tablet Oral BID    enoxaparin  40 mg Subcutaneous Daily    famotidine  20 mg Oral BID    fluticasone  1 spray Each Nare Daily    folic acid  1,000 mcg Oral Daily    foscarnet (FOSCAVIR) IVPB (peripheral)  90 mg/kg (Order-Specific) Intravenous Q12H    insulin aspart U-100  14 Units Subcutaneous TIDWM    insulin detemir U-100  18 Units Subcutaneous Daily    magnesium oxide  800 mg Oral BID    pravastatin  20 mg Oral Daily    predniSONE  10 mg Oral Daily    sodium chloride 0.9%  500 mL Intravenous Q12H    sodium chloride 0.9%  10 mL Intravenous Q6H    sulfamethoxazole-trimethoprim 800-160mg  1 tablet Oral Every Mon, Wed, Fri    tacrolimus  1.5 mg Oral BID     PRN Meds:acetaminophen, dextrose 50%, dextrose 50%, glucagon (human recombinant), glucose, glucose, insulin aspart U-100, levalbuterol, magnesium sulfate IVPB **AND** magnesium sulfate IVPB, ondansetron, potassium chloride 10% **AND** potassium chloride 10% **AND** potassium chloride 10%, promethazine (PHENERGAN) IVPB, Flushing PICC Protocol **AND** sodium chloride 0.9% **AND** sodium chloride 0.9%    Review of patient's allergies indicates:  No Known Allergies    Review of Systems   Constitutional: Negative for appetite change, chills, diaphoresis and fever.   HENT: Negative for congestion, postnasal drip, rhinorrhea, sinus pain and voice change.    Eyes: Negative for discharge and redness.   Respiratory: Negative for cough, shortness of breath  and wheezing.    Cardiovascular: Negative for chest pain, palpitations and leg swelling.   Gastrointestinal: Negative for abdominal distention, abdominal pain, constipation, diarrhea, nausea and vomiting.   Endocrine: Negative for polydipsia and polyuria.   Genitourinary: Negative for dysuria, frequency and urgency.   Musculoskeletal: Negative for arthralgias, back pain, myalgias and neck pain.   Skin: Negative for pallor and rash.   Neurological: Negative for dizziness, light-headedness and headaches.   Psychiatric/Behavioral: Negative for agitation, confusion and hallucinations.     Objective:   Physical Exam   Constitutional: He is oriented to person, place, and time. He appears well-developed and well-nourished. No distress.   HENT:   Head: Normocephalic and atraumatic.   Right Ear: External ear normal.   Left Ear: External ear normal.   Nose: Nose normal.   Mouth/Throat: Oropharynx is clear and moist.   Eyes: Conjunctivae and EOM are normal. Right eye exhibits no discharge. Left eye exhibits no discharge.   Neck: Normal range of motion. Neck supple.   Cardiovascular: Normal rate, regular rhythm and normal heart sounds. Exam reveals no gallop and no friction rub.   No murmur heard.  Pulmonary/Chest: Effort normal. He has no wheezes. He has no rhonchi. He has no rales.   Well-healed mid-line sternotomy incision   Abdominal: Soft. He exhibits no distension. There is no tenderness.   Musculoskeletal: Normal range of motion. He exhibits no edema.   Neurological: He is alert and oriented to person, place, and time.   Skin: Skin is warm and dry. He is not diaphoretic. No erythema.   Psychiatric: He has a normal mood and affect. His behavior is normal.   Nursing note and vitals reviewed.        Vital Signs (Most Recent):  Temp: 98.5 °F (36.9 °C) (02/14/19 1145)  Pulse: 81 (02/14/19 1145)  Resp: 19 (02/14/19 1145)  BP: (!) 132/95 (02/14/19 1145)  SpO2: 95 % (02/14/19 1145) Vital Signs (24h Range):  Temp:  [98 °F (36.7  °C)-98.6 °F (37 °C)] 98.5 °F (36.9 °C)  Pulse:  [77-91] 81  Resp:  [16-20] 19  SpO2:  [95 %-98 %] 95 %  BP: (123-157)/(85-97) 132/95     Weight: 102 kg (224 lb 13.9 oz)  Body mass index is 31.36 kg/m².      Intake/Output Summary (Last 24 hours) at 2/14/2019 1423  Last data filed at 2/14/2019 1200  Gross per 24 hour   Intake 4435 ml   Output 5200 ml   Net -765 ml       Significant Labs:  CBC:  Recent Labs   Lab 02/14/19  0600   WBC 7.22   RBC 4.08*   HGB 12.0*   HCT 37.0*      MCV 91   MCH 29.4   MCHC 32.4     BMP:  Recent Labs   Lab 02/14/19  0600      K 3.9      CO2 30*   BUN 11   CREATININE 0.9   CALCIUM 9.6      Tacrolimus Levels:  Recent Labs   Lab 02/14/19  0600   TACROLIMUS 8.9     Microbiology:  Microbiology Results (last 7 days)     Procedure Component Value Units Date/Time    Respiratory Viral Panel by PCR [facility]; Nasal Swab [[id number]] Collected:  02/12/19 2024    Order Status:  Completed Specimen:  Respiratory Updated:  02/14/19 1231     Respiratory Virus Panel, source Nasal Swab     RVP - Adenovirus Not Detected     Comment: Detects Serotypes B and E. Detection of Serotype C may   be limited. If Adenovirus infection is suspected and a   Not Detected result is returned the sample should be   re-tested for Adenovirus using an independent method  (e.g. [employer] Adenovirus Quantitative Real-Time  PCR test.          Enterovirus Not Detected     Comment: Cross-reactivity has been observed between certain Rhinovirus  strains and the Enterovirus assay.          Human Bocavirus Not Detected     Human Coronavirus Not Detected     Comment: The Human Coronavirus assay detects Human coronavirus types  229E, OC43,NL63 and HKU1.          RVP - Human Metapneumovirus (hMPV) Not Detected     RVP - Influenza A Not Detected     Influenza A - [id number] Not Detected     RVP - Influenza B Not Detected     Parainfluenza Not Detected     Respiratory Syncytial VirusVirus (RSV) A Not Detected     Comment:  The Respiratory Syncytial Viral assay detects types A and B,  however it does not distinguish between the two.          RVP - Rhinovirus Not Detected     Comment: Cross-Reactivity has been observed between certain   Rhinovirus strains and the Enterovirus assay.  Target Enriched Mulitplex Polymerase Chain Reaction (TEM-PCR)  allows for the detection of multiple pathogens out of a single  reaction.  This test was developed and its performance   characteristics determined by [employer].  It has not   been cleared or approved by the U.S.Food and Drug Administration.  Results should be used in conjunction with clinical findings,   and should not form the sole basis for a diagnosis or treatment  decision.  TEM-PCR is a licensed technology of [employer]         Narrative:       Receiving Lab:->[facility]          I have reviewed all pertinent labs within the past 24 hours.        Assessment/Plan:     * Cytomegalovirus (CMV) viremia    Patient was noted to have an elevated CMV PCR of 8160 log 3.91 on 8/15/2018.  He was started on valganciclovir 900 mg PO BID; and initially his viral load decreased. However, CMV PCR in 12/2018 demonstrated elevating levels, so patient was referred to ID, Dr. Lowry. Patient was noted to have UL97 mutation and was a candidate for a clinic trial study with maribavir. Clinical trial was started on the week of 12/18/2018 and was well tolerated by patient. Viral load decreased to <35 on 1/10 and 1/18/2019; however, patient was noted to have an increasing CMV PCR level to 3500 on 2/6/2019.     Tolerating IV Foscarnet with 500 cc NS bolus administered prior.  Awaiting insurance authorization for home administration.       Lung replaced by transplant    Patient is 17 months s/p bilateral lung transplant secondary to diagnosis of sarcoidosis with associated pulmonary hypertension. Transplant history complicated by left vocal cord dysfunction, A2 rejection X2 10/17 s/p pulse dose  steroids, and A2 rejection 03/2018 s/p thymoglobulin x3 doses.  Will continue current immunosuppression and prophylaxis. Most recent bronchoscopies in 08/2018 and 11/2018 negative for rejection. RVP is negative.     Prophylactic antibiotic    Continue Bactrim DS every MWF for PCP/PJP prophylaxis.      Immunosuppression    Continue tacrolimus 1.5 mg BID. Will monitor daily tacrolimus levels and dose adjust accordingly.   Patient has remained off of MMF therapy due to active CMV infection.  Previously on a prednisone taper. Will continue prednisone 10 mg daily for now, but will likely decrease dose to previous 5 mg soon.      Type 2 diabetes mellitus with hyperglycemia, with long-term current use of insulin    Poorly controlled with HgbA1c of 10.9 upon admission.   Endocrine consulted, appreciate recs. Will need continued follow up with endocrinology as outpatient.  Inpatient consult to MANSOOR schmitt for education on diabetic diet.          Irwin Celestin NP  Lung Transplant  [facility]

## 2019-02-14 NOTE — ASSESSMENT & PLAN NOTE
Patient was noted to have an elevated CMV PCR of 8160 log 3.91 on 8/15/2018.  He was started on valganciclovir 900 mg PO BID; and initially his viral load decreased. However, CMV PCR in 12/2018 demonstrated elevating levels, so patient was referred to ID, Dr. Lowry. Patient was noted to have UL97 mutation and was a candidate for a clinic trial study with maribavir. Clinical trial was started on the week of 12/18/2018 and was well tolerated by patient. Viral load decreased to <35 on 1/10 and 1/18/2019; however, patient was noted to have an increasing CMV PCR level to 3500 on 2/6/2019.     Tolerating IV Foscarnet with 500 cc NS bolus administered prior.  Awaiting insurance authorization for home administration.

## 2019-02-14 NOTE — SUBJECTIVE & OBJECTIVE
"Interval HPI:   Overnight events:     NAEON. BG is at or slightly above goal with prandial excursions noted with Prednisone 10 mg PO.     Eatin%  Nausea: No  Hypoglycemia and intervention: No  Fever: No  TPN and/or TF: No  If yes, type of TF/TPN and rate: None    BP (!) 139/97   Pulse 82   Temp 98.2 °F (36.8 °C) (Oral)   Resp 20   Ht 5' 11" (1.803 m)   Wt 102 kg (224 lb 13.9 oz)   SpO2 98%   BMI 31.36 kg/m²     Labs Reviewed and Include    Recent Labs   Lab 19  0636   *   CALCIUM 9.8   ALBUMIN 3.7   PROT 7.2      K 4.2   CO2 26      BUN 14   CREATININE 0.8   ALKPHOS 85   ALT 26   AST 26   BILITOT 0.6     Lab Results   Component Value Date    WBC 5.16 2019    HGB 12.8 (L) 2019    HCT 41.3 2019    MCV 94 2019     2019     No results for input(s): TSH, FREET4 in the last 168 hours.  Lab Results   Component Value Date    HGBA1C 10.9 (H) 2019       Nutritional status:   Body mass index is 31.36 kg/m².  Lab Results   Component Value Date    ALBUMIN 3.7 2019    ALBUMIN 3.8 2019    ALBUMIN 3.7 2019     No results found for: PREALBUMIN    Estimated Creatinine Clearance: 125.4 mL/min (based on SCr of 0.8 mg/dL).    Accu-Checks  Recent Labs     19  1805 19  2003 19  2324 19  0730 19  1226 19  1748 19  2233   POCTGLUCOSE 255* 256* 250* 237* 229* 311* 180*       Current Medications and/or Treatments Impacting Glycemic Control  Immunotherapy:    Immunosuppressants         Stop Route Frequency     tacrolimus capsule 1.5 mg      -- Oral 2 times daily        Steroids:   Hormones (From admission, onward)    Start     Stop Route Frequency Ordered    19 0900  predniSONE tablet 10 mg      -- Oral Daily 19 1639        Pressors:    Autonomic Drugs (From admission, onward)    None        Hyperglycemia/Diabetes Medications:   Antihyperglycemics (From admission, onward)    Start     Stop " Route Frequency Ordered    02/13/19 0900  insulin detemir U-100 pen 18 Units      -- SubQ Daily 02/12/19 1743    02/12/19 1842  insulin aspart U-100 pen 0-5 Units      -- SubQ Before meals & nightly PRN 02/12/19 1743    02/12/19 1745  insulin aspart U-100 pen 10 Units      -- SubQ 3 times daily with meals 02/12/19 1743

## 2019-02-14 NOTE — PLAN OF CARE
Discharge Recommendations:   Resume home regimen:    Tresiba 20 units SQ Daily  Humalog 14 units SQ TIDWM  Metformin 500 mg PO BID  POC AC/HS    Pt has a follow up appointment in endocrinology clinic in April, 2019.

## 2019-02-14 NOTE — ASSESSMENT & PLAN NOTE
BG goal 140 - 180    Continue Levemir 18 units daily  Increase Novolog to 12 units TIDWM (prandial excursions likely due to steroid therapy).  BG monitoring AC/HS.     Discharge Recommendations:     [place] up [place] [facility]    Continue [facility] regimen.

## 2019-02-14 NOTE — ASSESSMENT & PLAN NOTE
Patient is 17 months s/p bilateral lung transplant secondary to diagnosis of sarcoidosis with associated pulmonary hypertension. 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  Will continue current immunosuppression and prophylaxis. Most recent bronchoscopies in 08/2018 and 11/2018 negative for rejection. RVP is negative.

## 2019-02-14 NOTE — PROGRESS NOTES
Roman Refractory or Resistant CMV Treatment Study  Protocol: [id number]  IRB# [id number]  PI: Jaron ALY)  Site: [id number]     VISIT 11/ WEEK 9:       Date of Visit: 02/13/2019     Mr. Martin Hendrix was seen in hospital for Visit 11/Week 9 for Roman CMV treatment study. Patient is now in follow up phase of study. Received Maribavir 400mg po BID for 8 weeks as per study protocol that completed on 2/6/19.     CMV viral load had been less than <35 on 1/18/19. Increased to 538 on 2/1/19 then to 3500 on 2/6/19. Patient was admitted to hospital on 2/12/19 to begin treatment for CMV viremia with Foscarnet. First dose given 2/12/19, PICC line placed 2/13/19. Patient tolerating infusions well.     No episodes of rejection assessed, no CMV invasive disease or CMV syndrome assessed. No new problems or complaints. Denies fever, chills.      Plan:  1. Study labs drawn and processed per protocol.  2. Next research study visit with labs prior on 02/20/2019.   3. Call clinic with any questions/concerns.

## 2019-02-15 VITALS
DIASTOLIC BLOOD PRESSURE: 75 MMHG | TEMPERATURE: 98 F | RESPIRATION RATE: 16 BRPM | HEART RATE: 100 BPM | OXYGEN SATURATION: 95 % | BODY MASS INDEX: 31.13 KG/M2 | SYSTOLIC BLOOD PRESSURE: 127 MMHG | HEIGHT: 71 IN | WEIGHT: 222.38 LBS

## 2019-02-15 LAB
ALBUMIN SERPL BCP-MCNC: 3.2 G/DL
ALP SERPL-CCNC: 69 U/L
ALT SERPL W/O P-5'-P-CCNC: 22 U/L
ANION GAP SERPL CALC-SCNC: 10 MMOL/L
AST SERPL-CCNC: 16 U/L
BASOPHILS # BLD AUTO: 0.04 K/UL
BASOPHILS NFR BLD: 0.6 %
BILIRUB SERPL-MCNC: 0.5 MG/DL
BUN SERPL-MCNC: 11 MG/DL
CALCIUM SERPL-MCNC: 9 MG/DL
CHLORIDE SERPL-SCNC: 104 MMOL/L
CO2 SERPL-SCNC: 28 MMOL/L
CREAT SERPL-MCNC: 0.8 MG/DL
DIFFERENTIAL METHOD: ABNORMAL
EOSINOPHIL # BLD AUTO: 0.2 K/UL
EOSINOPHIL NFR BLD: 3.7 %
ERYTHROCYTE [DISTWIDTH] IN BLOOD BY AUTOMATED COUNT: 14 %
EST. GFR  (AFRICAN AMERICAN): >60 ML/MIN/1.73 M^2
EST. GFR  (NON AFRICAN AMERICAN): >60 ML/MIN/1.73 M^2
GLUCOSE SERPL-MCNC: 118 MG/DL
HCT VFR BLD AUTO: 32.5 %
HGB BLD-MCNC: 10.9 G/DL
IMM GRANULOCYTES # BLD AUTO: 0.04 K/UL
IMM GRANULOCYTES NFR BLD AUTO: 0.6 %
LYMPHOCYTES # BLD AUTO: 1 K/UL
LYMPHOCYTES NFR BLD: 15.5 %
MAGNESIUM SERPL-MCNC: 1.4 MG/DL
MCH RBC QN AUTO: 30 PG
MCHC RBC AUTO-ENTMCNC: 33.5 G/DL
MCV RBC AUTO: 90 FL
MONOCYTES # BLD AUTO: 0.7 K/UL
MONOCYTES NFR BLD: 10.7 %
NEUTROPHILS # BLD AUTO: 4.5 K/UL
NEUTROPHILS NFR BLD: 68.9 %
NRBC BLD-RTO: 0 /100 WBC
PLATELET # BLD AUTO: 233 K/UL
PMV BLD AUTO: 8.8 FL
POCT GLUCOSE: 185 MG/DL (ref 70–110)
POCT GLUCOSE: 270 MG/DL (ref 70–110)
POCT GLUCOSE: 275 MG/DL (ref 70–110)
POTASSIUM SERPL-SCNC: 3.6 MMOL/L
PROT SERPL-MCNC: 6.1 G/DL
RBC # BLD AUTO: 3.63 M/UL
SODIUM SERPL-SCNC: 142 MMOL/L
TACROLIMUS BLD-MCNC: 6.8 NG/ML
WBC # BLD AUTO: 6.52 K/UL

## 2019-02-15 PROCEDURE — 99233 SBSQ HOSP IP/OBS HIGH 50: CPT | Mod: ,,, | Performed by: INTERNAL MEDICINE

## 2019-02-15 PROCEDURE — 99238 PR HOSPITAL DISCHARGE DAY,<30 MIN: ICD-10-PCS | Mod: ,,, | Performed by: PHYSICIAN ASSISTANT

## 2019-02-15 PROCEDURE — 25000003 PHARM REV CODE 250: Performed by: PHYSICIAN ASSISTANT

## 2019-02-15 PROCEDURE — 80053 COMPREHEN METABOLIC PANEL: CPT

## 2019-02-15 PROCEDURE — 99233 PR SUBSEQUENT HOSPITAL CARE,LEVL III: ICD-10-PCS | Mod: ,,, | Performed by: INTERNAL MEDICINE

## 2019-02-15 PROCEDURE — 63600175 PHARM REV CODE 636 W HCPCS: Performed by: PHYSICIAN ASSISTANT

## 2019-02-15 PROCEDURE — 99238 HOSP IP/OBS DSCHRG MGMT 30/<: CPT | Mod: ,,, | Performed by: PHYSICIAN ASSISTANT

## 2019-02-15 PROCEDURE — 83735 ASSAY OF MAGNESIUM: CPT

## 2019-02-15 PROCEDURE — 85025 COMPLETE CBC W/AUTO DIFF WBC: CPT

## 2019-02-15 PROCEDURE — 80197 ASSAY OF TACROLIMUS: CPT

## 2019-02-15 PROCEDURE — 25000003 PHARM REV CODE 250: Performed by: INTERNAL MEDICINE

## 2019-02-15 PROCEDURE — A4216 STERILE WATER/SALINE, 10 ML: HCPCS | Performed by: INTERNAL MEDICINE

## 2019-02-15 PROCEDURE — 63600175 PHARM REV CODE 636 W HCPCS: Mod: JG | Performed by: INTERNAL MEDICINE

## 2019-02-15 RX ORDER — INSULIN ASPART 100 [IU]/ML
17 INJECTION, SOLUTION INTRAVENOUS; SUBCUTANEOUS
Status: DISCONTINUED | OUTPATIENT
Start: 2019-02-15 | End: 2019-02-15 | Stop reason: HOSPADM

## 2019-02-15 RX ORDER — PREDNISONE 5 MG/1
5 TABLET ORAL DAILY
Status: DISCONTINUED | OUTPATIENT
Start: 2019-02-15 | End: 2019-02-15 | Stop reason: HOSPADM

## 2019-02-15 RX ADMIN — INSULIN ASPART 14 UNITS: 100 INJECTION, SOLUTION INTRAVENOUS; SUBCUTANEOUS at 08:02

## 2019-02-15 RX ADMIN — MAGNESIUM SULFATE IN WATER 2 G: 40 INJECTION, SOLUTION INTRAVENOUS at 11:02

## 2019-02-15 RX ADMIN — Medication 10 ML: at 06:02

## 2019-02-15 RX ADMIN — FAMOTIDINE 20 MG: 20 TABLET ORAL at 09:02

## 2019-02-15 RX ADMIN — INSULIN DETEMIR 18 UNITS: 100 INJECTION, SOLUTION SUBCUTANEOUS at 08:02

## 2019-02-15 RX ADMIN — TACROLIMUS 1.5 MG: 1 CAPSULE ORAL at 08:02

## 2019-02-15 RX ADMIN — POTASSIUM CHLORIDE 40 MEQ: 20 SOLUTION ORAL at 09:02

## 2019-02-15 RX ADMIN — OYSTER SHELL CALCIUM WITH VITAMIN D 1 TABLET: 500; 200 TABLET, FILM COATED ORAL at 09:02

## 2019-02-15 RX ADMIN — AMLODIPINE BESYLATE 5 MG: 5 TABLET ORAL at 09:02

## 2019-02-15 RX ADMIN — FLUTICASONE PROPIONATE 50 MCG: 50 SPRAY, METERED NASAL at 09:02

## 2019-02-15 RX ADMIN — Medication 10 ML: at 12:02

## 2019-02-15 RX ADMIN — INSULIN ASPART 17 UNITS: 100 INJECTION, SOLUTION INTRAVENOUS; SUBCUTANEOUS at 11:02

## 2019-02-15 RX ADMIN — MAGNESIUM OXIDE TAB 400 MG (241.3 MG ELEMENTAL MG) 800 MG: 400 (241.3 MG) TAB at 09:02

## 2019-02-15 RX ADMIN — PRAVASTATIN SODIUM 20 MG: 20 TABLET ORAL at 09:02

## 2019-02-15 RX ADMIN — MAGNESIUM SULFATE IN WATER 2 G: 40 INJECTION, SOLUTION INTRAVENOUS at 09:02

## 2019-02-15 RX ADMIN — ASPIRIN 81 MG: 81 TABLET, COATED ORAL at 09:02

## 2019-02-15 RX ADMIN — INSULIN ASPART 3 UNITS: 100 INJECTION, SOLUTION INTRAVENOUS; SUBCUTANEOUS at 11:02

## 2019-02-15 RX ADMIN — FOLIC ACID 1000 MCG: 1 TABLET ORAL at 09:02

## 2019-02-15 RX ADMIN — PREDNISONE 5 MG: 5 TABLET ORAL at 09:02

## 2019-02-15 RX ADMIN — FOSCARNET SODIUM 9000 MG: 24 INJECTION, SOLUTION INTRAVENOUS at 10:02

## 2019-02-15 RX ADMIN — SODIUM CHLORIDE 500 ML: 0.9 INJECTION, SOLUTION INTRAVENOUS at 08:02

## 2019-02-15 NOTE — PROGRESS NOTES
[facility]  Infectious Disease  Progress Note    Patient Name: Martin Hendrix Jr.  MRN: [id number]  Admission Date: 2/12/2019  Length of Stay: 3 days  Attending Physician: No att. 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 He is stable on IV foscarnet.    -Pt had PFTs done 2/14; RVP negative    Recommendations:   - Patient tolerating foscarnet well, renal functions stable continue foscarnet with infusions  - would check daily BMP with Mg for monitoring of nephrotoxicity and electrolytes  - repeat PCR due 2/19/2019  - pt will need f/u with Dr Lowry in ID clinic    ID will sign off         Anticipated Disposition: ID will sign off    Thank you for your consult. I will sign off. Please contact us if you have any additional questions.    Natanael Dior MD   ID Fellow  Infectious Disease  [facility]    Subjective:     Principal Problem:Cytomegalovirus (CMV) viremia    HPI: Mr. 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 As an outpatient, he became undetectable after 4wks of therapy in mid 1/2019 but ultimately was noted to have rising quantitative CMV PCR's (3500 on 2/6/2019), prompting admission for standard of care therapy with foscarnet now. He notes a recent URI that resolved but currently denies recent F/C/S, cough, SOB, N/V, anorexia, diarrhea, or abdominal pain. He had tolerated the first infusions of this agent  Interval History: pt seen, feeling fine, tolerating foscarnet infusion fine    Review of Systems   Constitutional: Negative for appetite change, chills, diaphoresis and fever.   HENT: Negative for congestion, postnasal drip, rhinorrhea, sinus pain and voice change.    Eyes: Negative for discharge and redness.   Respiratory: Negative for cough, shortness of breath and wheezing.    Cardiovascular: Negative for chest pain, palpitations and leg swelling.   Gastrointestinal: Negative for abdominal distention, abdominal pain, constipation, diarrhea, nausea and vomiting.   Endocrine: Negative for polydipsia and polyuria.   Genitourinary: Negative for dysuria, frequency and urgency.   Musculoskeletal: Negative for arthralgias, back pain, myalgias and neck pain.   Skin: Negative for pallor and rash.   Neurological: Negative for dizziness, light-headedness and headaches.   Psychiatric/Behavioral: Negative for agitation, confusion and hallucinations.     Objective:     Vital Signs (Most Recent):  Temp: 97.8 °F (36.6 °C) (02/15/19 1446)  Pulse: 100 (02/15/19 1446)  Resp: 16 (02/15/19 1446)  BP: 127/75 (02/15/19 1446)  SpO2: 95 % (02/15/19 1446) Vital Signs (24h Range):  Temp:  [97.4 °F (36.3 °C)-98.4 °F (36.9 °C)] 97.8 °F (36.6 °C)  Pulse:  [] 100  Resp:  [16-23] 16  SpO2:  [94 %-100 %] 95 %  BP: (109-142)/(71-93) 127/75     Weight: 100.9 kg (222 lb 6.4 oz)  Body mass index is 31.02 kg/m².    Estimated Creatinine Clearance: 124.7 mL/min (based on SCr of 0.8  mg/dL).    Physical Exam   Constitutional: He is oriented to person, place, and time. He appears well-developed and well-nourished. No distress.   HENT:   Head: Normocephalic and atraumatic.   Right Ear: External ear normal.   Left Ear: External ear normal.   Nose: Nose normal.   Mouth/Throat: Oropharynx is clear and moist.   Eyes: Conjunctivae and EOM are normal. Right eye exhibits no discharge. Left eye exhibits no discharge.   Neck: Normal range of motion. Neck supple.   Cardiovascular: Normal rate, regular rhythm and normal heart sounds. Exam reveals no gallop and no friction rub.   No murmur heard.  Pulmonary/Chest: Effort normal. He has no wheezes. He has no rhonchi. He has no rales.   Well-healed mid-line sternotomy incision   Abdominal: Soft. He exhibits no distension. There is no tenderness.   Musculoskeletal: Normal range of motion. He exhibits no edema.   Neurological: He is alert and oriented to person, place, and time.   Skin: Skin is warm and dry. He is not diaphoretic. No erythema.   Psychiatric: He has a normal mood and affect. His behavior is normal.   Nursing note and vitals reviewed.      Significant Labs:   CBC:   Recent Labs   Lab 02/14/19  0600 02/15/19  0442   WBC 7.22 6.52   HGB 12.0* 10.9*   HCT 37.0* 32.5*    233     CMP:   Recent Labs   Lab 02/14/19  0600 02/15/19  0442    142   K 3.9 3.6    104   CO2 30* 28   * 118*   BUN 11 11   CREATININE 0.9 0.8   CALCIUM 9.6 9.0   PROT 6.9 6.1   ALBUMIN 3.6 3.2*   BILITOT 0.6 0.5   ALKPHOS 78 69   AST 20 16   ALT 27 22   ANIONGAP 9 10   EGFRNONAA >60.0 >60.0     Microbiology Results (last 7 days)     Procedure Component Value Units Date/Time    Respiratory Viral Panel by PCR [facility]; Nasal Swab [[id number]] Collected:  02/12/19 2024    Order Status:  Completed Specimen:  Respiratory Updated:  02/14/19 1231     Respiratory Virus Panel, source Nasal Swab     RVP - Adenovirus Not Detected     Comment: Detects Serotypes B and E.  Detection of Serotype C may   be limited. If Adenovirus infection is suspected and a   Not Detected result is returned the sample should be   re-tested for Adenovirus using an independent method  (e.g. [employer] [facility] Adenovirus Quantitative Real-Time  PCR test.          Enterovirus Not Detected     Comment: Cross-reactivity has been observed between certain Rhinovirus  strains and the Enterovirus assay.          Human Bocavirus Not Detected     Human Coronavirus Not Detected     Comment: The Human Coronavirus assay detects Human coronavirus types  229E, OC43,NL63 and HKU1.          RVP - Human Metapneumovirus (hMPV) Not Detected     RVP - Influenza A Not Detected     Influenza A - G6Q8-87 Not Detected     RVP - Influenza B Not Detected     Parainfluenza Not Detected     Respiratory Syncytial VirusVirus (RSV) A Not Detected     Comment: The Respiratory Syncytial Viral assay detects types A and B,  however it does not distinguish between the two.          RVP - Rhinovirus Not Detected     Comment: Cross-Reactivity has been observed between certain   Rhinovirus strains and the Enterovirus assay.  Target Enriched Mulitplex Polymerase Chain Reaction (TEM-PCR)  allows for the detection of multiple pathogens out of a single  reaction.  This test was developed and its performance   characteristics determined by [employer].  It has not   been cleared or approved by the U.S.Food and Drug Administration.  Results should be used in conjunction with clinical findings,   and should not form the sole basis for a diagnosis or treatment  decision.  TEM-PCR is a licensed technology of [facility].         Narrative:       Receiving Lab:->[facility]        All pertinent labs within the past 24 hours have been reviewed.    Significant Imaging: I have reviewed all pertinent imaging results/findings within the past 24 hours.

## 2019-02-15 NOTE — ASSESSMENT & PLAN NOTE
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 He is stable on IV foscarnet.    -Pt had PFTs done 2/14; RVP negative    Recommendations:   - Patient tolerating foscarnet well, renal functions stable continue foscarnet with infusions  - would check daily BMP with Mg for monitoring of nephrotoxicity and electrolytes  - repeat PCR due 2/19/2019  - pt will need f/u with Dr Lowry in ID clinic    ID will sign off

## 2019-02-15 NOTE — ASSESSMENT & PLAN NOTE
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 He is stable on IV foscarnet.    -Pt had PFTs done 2/14; RVP negative    Recommendations:   - would continue foscarnet with infusions  - would check daily BMP with Mg for monitoring of nephrotoxicity and electrolytes  - repeat PCR due 2/19/2019    ID will follow

## 2019-02-15 NOTE — PROGRESS NOTES
[facility]  Infectious Disease  Progress Note    Patient Name: Martin Hendrix Jr.  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 He is stable on IV foscarnet.    -Pt had PFTs done 2/14; RVP negative    Recommendations:   - would continue foscarnet with infusions  - would check daily BMP with Mg for monitoring of nephrotoxicity and electrolytes  - repeat PCR due 2/19/2019    ID will follow         Anticipated Disposition: ID will follow    Thank you for your consult. I will follow-up with patient. Please contact us if you have any additional questions.    Natanael Dior MD   ID Fellow  Infectious Disease  [facility]    Subjective:     Principal Problem:Cytomegalovirus (CMV) viremia    HPI: Mr. 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 As an outpatient, he became  undetectable after 4wks of therapy in mid 1/2019 but ultimately was noted to have rising quantitative CMV PCR's (3500 on 2/6/2019), prompting admission for standard of care therapy with foscarnet now. He notes a recent URI that resolved but currently denies recent F/C/S, cough, SOB, N/V, anorexia, diarrhea, or abdominal pain. He had tolerated the first infusions of this agent  Interval History: pt seen, sitting n chair, no complaints, no fevers, no cough, no N/V/D    Review of Systems   Constitutional: Negative for appetite change, chills, diaphoresis and fever.   HENT: Negative for congestion, postnasal drip, rhinorrhea, sinus pain and voice change.    Eyes: Negative for discharge and redness.   Respiratory: Negative for cough, shortness of breath and wheezing.    Cardiovascular: Negative for chest pain, palpitations and leg swelling.   Gastrointestinal: Negative for abdominal distention, abdominal pain, constipation, diarrhea, nausea and vomiting.   Endocrine: Negative for polydipsia and polyuria.   Genitourinary: Negative for dysuria, frequency and urgency.   Musculoskeletal: Negative for arthralgias, back pain, myalgias and neck pain.   Skin: Negative for pallor and rash.   Neurological: Negative for dizziness, light-headedness and headaches.   Psychiatric/Behavioral: Negative for agitation, confusion and hallucinations.     Objective:     Vital Signs (Most Recent):  Temp: 97.8 °F (36.6 °C) (02/14/19 1643)  Pulse: 85 (02/14/19 1757)  Resp: 19 (02/14/19 1520)  BP: (!) 140/101 (02/14/19 1520)  SpO2: 98 % (02/14/19 1757) Vital Signs (24h Range):  Temp:  [97.8 °F (36.6 °C)-98.6 °F (37 °C)] 97.8 °F (36.6 °C)  Pulse:  [77-91] 85  Resp:  [16-20] 19  SpO2:  [95 %-99 %] 98 %  BP: (123-140)/() 140/101     Weight: 102 kg (224 lb 13.9 oz)  Body mass index is 31.36 kg/m².    Estimated Creatinine Clearance: 111.5 mL/min (based on SCr of 0.9 mg/dL).    Physical Exam   Constitutional: He is oriented to person, place, and  time. He appears well-developed and well-nourished. No distress.   HENT:   Head: Normocephalic and atraumatic.   Right Ear: External ear normal.   Left Ear: External ear normal.   Nose: Nose normal.   Mouth/Throat: Oropharynx is clear and moist.   Eyes: Conjunctivae and EOM are normal. Right eye exhibits no discharge. Left eye exhibits no discharge.   Neck: Normal range of motion. Neck supple.   Cardiovascular: Normal rate, regular rhythm and normal heart sounds. Exam reveals no gallop and no friction rub.   No murmur heard.  Pulmonary/Chest: Effort normal. He has no wheezes. He has no rhonchi. He has no rales.   Well-healed mid-line sternotomy incision   Abdominal: Soft. He exhibits no distension. There is no tenderness.   Musculoskeletal: Normal range of motion. He exhibits no edema.   Neurological: He is alert and oriented to person, place, and time.   Skin: Skin is warm and dry. He is not diaphoretic. No erythema.   Psychiatric: He has a normal mood and affect. His behavior is normal.   Nursing note and vitals reviewed.      Significant Labs:   CBC:   Recent Labs   Lab 02/12/19  1840 02/13/19  0636 02/14/19  0600   WBC 6.47 5.16 7.22   HGB 11.2* 12.8* 12.0*   HCT 34.1* 41.3 37.0*    218 282     CMP:   Recent Labs   Lab 02/12/19  1840 02/13/19  0636 02/14/19  0600     136 138 141   K 4.3  4.3 4.2 3.9     101 102 102   CO2 27  27 26 30*   *  267* 184* 132*   BUN 18  18 14 11   CREATININE 1.1  1.1 0.8 0.9   CALCIUM 9.4  9.4 9.8 9.6   PROT 7.0 7.2 6.9   ALBUMIN 3.8 3.7 3.6   BILITOT 0.5 0.6 0.6   ALKPHOS 86 85 78   AST 22 26 20   ALT 26 26 27   ANIONGAP 8  8 10 9   EGFRNONAA >60.0  >60.0 >60.0 >60.0     Microbiology Results (last 7 days)     Procedure Component Value Units Date/Time    Respiratory Viral Panel by PCR [facility]; Nasal Swab [[id number]] Collected:  02/12/19 2024    Order Status:  Completed Specimen:  Respiratory Updated:  02/14/19 1231     Respiratory Virus Panel,  source Nasal Swab     RVP - Adenovirus Not Detected     Comment: Detects Serotypes B and E. Detection of Serotype C may   be limited. If Adenovirus infection is suspected and a   Not Detected result is returned the sample should be   re-tested for Adenovirus using an independent method  (e.g. [employer] Adenovirus Quantitative Real-Time  PCR test.          Enterovirus Not Detected     Comment: Cross-reactivity has been observed between certain Rhinovirus  strains and the Enterovirus assay.          Human Bocavirus Not Detected     Human Coronavirus Not Detected     Comment: The Human Coronavirus assay detects Human coronavirus types  229E, OC43,NL63 and HKU1.          RVP - Human Metapneumovirus (hMPV) Not Detected     RVP - Influenza A Not Detected     Influenza A - [id number] Not Detected     RVP - Influenza B Not Detected     Parainfluenza Not Detected     Respiratory Syncytial VirusVirus (RSV) A Not Detected     Comment: The Respiratory Syncytial Viral assay detects types A and B,  however it does not distinguish between the two.          RVP - Rhinovirus Not Detected     Comment: Cross-Reactivity has been observed between certain   Rhinovirus strains and the Enterovirus assay.  Target Enriched Mulitplex Polymerase Chain Reaction (TEM-PCR)  allows for the detection of multiple pathogens out of a single  reaction.  This test was developed and its performance   characteristics determined by [employer].  It has not   been cleared or approved by the U.S.Food and Drug Administration.  Results should be used in conjunction with clinical findings,   and should not form the sole basis for a diagnosis or treatment  decision.  TEM-PCR is a licensed technology of [employer].         Narrative:       Receiving Lab:->[facility]        All pertinent labs within the past 24 hours have been reviewed.    Significant Imaging: I have reviewed all pertinent imaging results/findings within the past 24  hours.

## 2019-02-15 NOTE — PROGRESS NOTES
Discharge Note:    SW met with pt to assess needs for discharge. Pt scheduled to discharge to home with IV infusions set up through [facility] (Ph# [phone]/Fx# [phone]). SW reviewed discharge plan with pt. Pt aware of, and involved in discharge plan. Pt to discharge home with the assistance of self and will transport self. Pt reports coping adequately with discharge at this time and was sitting up on bed alert and oriented x 4 with pleasant affect.  Pt verbalized no additional needs or concerns at this time.  Contact information provided. SW to remain available.

## 2019-02-15 NOTE — NURSING
Discharge instructions given and reviewed. Pharm D updated blue card. Mg+2 =1.4 this am. MG+2 riders X2 given. Mg+2 home dose increased. Pt has needed RX for home. SANDRA Nieto RN provided instructions on Foscarnet infusion. Needed supplies were delivered to pt room.  Pt stated he understood how to infuse NS and Foscarnet at home. Verbalized understanding of discharge instructions.

## 2019-02-15 NOTE — PLAN OF CARE
Problem: Adult Inpatient Plan of Care  Goal: Plan of Care Review  Outcome: Ongoing (interventions implemented as appropriate)  Pt aao x4. Call bell within reach. Pt ambulates frequently in hallway. Plan to d/c home tomorrow with home health for foscarnet infusions. Will continue to monitor.

## 2019-02-15 NOTE — PROGRESS NOTES
Discharge Medication Note:    Mr. Hendrix is a 58 YOM s/p BLT on 8/22/17 secondary to sarcoidosis.      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     Plan:  Foscarnet 9000 mg q12h IV infusion.  Repeat CMV PCR on 2/19.  Will f/u with Dr. Lowry in clinic.      Met with Martin Hendrix Jr. at discharge to review discharge medications and to update the blue medication card.       Martin Hendrix Jr.   Home Medication Instructions JESUSITA:[id number]    Printed on:02/15/19 1202   Medication Information                      amLODIPine (NORVASC) 5 MG tablet  Take 1 tablet (5 mg total) by mouth once daily.             CALCIUM 600 + D,3, 600 mg(1,500mg) -200 unit Tab  TAKE 1 TABLET BY MOUTH TWICE DAILY             ECOTRIN LOW STRENGTH 81 mg EC tablet  TAKE 1 TABLET DAILY             famotidine (PEPCID) 20 MG tablet  TAKE 1 TABLET TWICE A DAY             fluticasone (FLONASE) 50 mcg/actuation nasal spray  1 spray by Each Nare route once daily.             folic acid (FOLVITE) 1 MG tablet  TAKE 1 TABLET DAILY             foscarnet in dextrose 5 % infusion  Inject 750 mLs (9,000 mg total) into the vein every 12 (twelve) hours.             furosemide (LASIX) 40 MG tablet  Take 1 tablet (40 mg total) by mouth daily as needed.             insulin degludec (TRESIBA FLEXTOUCH U-200) 200 unit/mL (3 mL) InPn  Inject 20 Units into the skin every evening.             insulin lispro (HUMALOG KWIKPEN INSULIN) 100 unit/mL InPn pen  Inject 14 Units into the skin 3 (three) times daily before meals. + correction scale TDD: 60 units             lancets Misc  To check BG 4 times daily, to use with insurance preferred meter             magnesium oxide  "(MAG-OX) 400 mg (241.3 mg magnesium) tablet  Take 2 tablets (800 mg total) by mouth 2 (two) times daily.             metFORMIN (GLUCOPHAGE-XR) 500 MG 24 hr tablet  TAKE 2 TABLETS BY MOUTH TWICE DAILY WITH MEALS             multivitamin (THERAGRAN) per tablet  Take 1 tablet by mouth once daily.             ONETOUCH VERIO Strp  USE TO CHECK BLOOD GLUCOSE FOUR TIMES A DAY, TO USE WITH INSURANCE PREFERRED METER             pen needle, diabetic (BD ULTRA-FINE JIM PEN NEEDLES) 32 gauge x 5/32" Ndle  Uses 4 times daily, on multiple daily insulin injections             pravastatin (PRAVACHOL) 20 MG tablet  TAKE 1 TABLET BY MOUTH ONCE DAILY             predniSONE (DELTASONE) 5 MG tablet  Take 1 tablet (5 mg total) by mouth once daily.             sulfamethoxazole-trimethoprim 800-160mg (BACTRIM DS) 800-160 mg Tab  Take 1 tablet by mouth every Mon, Wed, Fri.             tacrolimus (PROGRAF) 0.5 MG Cap  Take 3 capsules (1.5 mg total) by mouth every 12 (twelve) hours.                 Pt was provided with prescriptions for the following meds:  E-rx: none    Martin Hendrix Jr. verbalized understanding and had the opportunity to ask questions.  "

## 2019-02-15 NOTE — PLAN OF CARE
Problem: Adult Inpatient Plan of Care  Goal: Plan of Care Review  Outcome: Ongoing (interventions implemented as appropriate)  Afebrile. NS bolus 500ml given followed by am dose Foscarnet. Tolerated well. Mg+2=1.4 M+2 riders X2 given. K+=3.6. K+elixir po given. Pt tolerating po well. Glucoses monitored. Insulin given as ordered. Reinforced to wear non-skid socks when ambulating to prevent falling. Verbalized understanding. Plan to discharge pt home on IV Foscarnet BID.

## 2019-02-15 NOTE — PLAN OF CARE
Reviewed BG levels and insulin regimen.     Goal BG while inpatient: 140-180 mg/dl  Current BG levels are above goal    The dose of Prednisone has been decreased from 10 mg to 5 mg daily.    Recommendations:   -Increase Levemir to 20 units, daily  -Increase Novolog to 17 units AC  -Low dose correction  -POC AC/HS    Discharge Recommendations: TBD

## 2019-02-15 NOTE — SUBJECTIVE & OBJECTIVE
Interval History: pt seen, feeling fine, tolerating foscarnet infusion fine    Review of Systems   Constitutional: Negative for appetite change, chills, diaphoresis and fever.   HENT: Negative for congestion, postnasal drip, rhinorrhea, sinus pain and voice change.    Eyes: Negative for discharge and redness.   Respiratory: Negative for cough, shortness of breath and wheezing.    Cardiovascular: Negative for chest pain, palpitations and leg swelling.   Gastrointestinal: Negative for abdominal distention, abdominal pain, constipation, diarrhea, nausea and vomiting.   Endocrine: Negative for polydipsia and polyuria.   Genitourinary: Negative for dysuria, frequency and urgency.   Musculoskeletal: Negative for arthralgias, back pain, myalgias and neck pain.   Skin: Negative for pallor and rash.   Neurological: Negative for dizziness, light-headedness and headaches.   Psychiatric/Behavioral: Negative for agitation, confusion and hallucinations.     Objective:     Vital Signs (Most Recent):  Temp: 97.8 °F (36.6 °C) (02/15/19 1446)  Pulse: 100 (02/15/19 1446)  Resp: 16 (02/15/19 1446)  BP: 127/75 (02/15/19 1446)  SpO2: 95 % (02/15/19 1446) Vital Signs (24h Range):  Temp:  [97.4 °F (36.3 °C)-98.4 °F (36.9 °C)] 97.8 °F (36.6 °C)  Pulse:  [] 100  Resp:  [16-23] 16  SpO2:  [94 %-100 %] 95 %  BP: (109-142)/(71-93) 127/75     Weight: 100.9 kg (222 lb 6.4 oz)  Body mass index is 31.02 kg/m².    Estimated Creatinine Clearance: 124.7 mL/min (based on SCr of 0.8 mg/dL).    Physical Exam   Constitutional: He is oriented to person, place, and time. He appears well-developed and well-nourished. No distress.   HENT:   Head: Normocephalic and atraumatic.   Right Ear: External ear normal.   Left Ear: External ear normal.   Nose: Nose normal.   Mouth/Throat: Oropharynx is clear and moist.   Eyes: Conjunctivae and EOM are normal. Right eye exhibits no discharge. Left eye exhibits no discharge.   Neck: Normal range of motion. Neck  supple.   Cardiovascular: Normal rate, regular rhythm and normal heart sounds. Exam reveals no gallop and no friction rub.   No murmur heard.  Pulmonary/Chest: Effort normal. He has no wheezes. He has no rhonchi. He has no rales.   Well-healed mid-line sternotomy incision   Abdominal: Soft. He exhibits no distension. There is no tenderness.   Musculoskeletal: Normal range of motion. He exhibits no edema.   Neurological: He is alert and oriented to person, place, and time.   Skin: Skin is warm and dry. He is not diaphoretic. No erythema.   Psychiatric: He has a normal mood and affect. His behavior is normal.   Nursing note and vitals reviewed.      Significant Labs:   CBC:   Recent Labs   Lab 02/14/19  0600 02/15/19  0442   WBC 7.22 6.52   HGB 12.0* 10.9*   HCT 37.0* 32.5*    233     CMP:   Recent Labs   Lab 02/14/19  0600 02/15/19  0442    142   K 3.9 3.6    104   CO2 30* 28   * 118*   BUN 11 11   CREATININE 0.9 0.8   CALCIUM 9.6 9.0   PROT 6.9 6.1   ALBUMIN 3.6 3.2*   BILITOT 0.6 0.5   ALKPHOS 78 69   AST 20 16   ALT 27 22   ANIONGAP 9 10   EGFRNONAA >60.0 >60.0     Microbiology Results (last 7 days)     Procedure Component Value Units Date/Time    Respiratory Viral Panel by PCR [facility]; Nasal Swab [[id number]] Collected:  02/12/19 2024    Order Status:  Completed Specimen:  Respiratory Updated:  02/14/19 1231     Respiratory Virus Panel, source Nasal Swab     RVP - Adenovirus Not Detected     Comment: Detects Serotypes B and E. Detection of Serotype C may   be limited. If Adenovirus infection is suspected and a   Not Detected result is returned the sample should be   re-tested for Adenovirus using an independent method  (e.g. [employer] Adenovirus Quantitative Real-Time  PCR test.          Enterovirus Not Detected     Comment: Cross-reactivity has been observed between certain Rhinovirus  strains and the Enterovirus assay.          Human Bocavirus Not Detected     Human Coronavirus  Not Detected     Comment: The Human Coronavirus assay detects Human coronavirus types  229E, OC43,NL63 and HKU1.          RVP - Human Metapneumovirus (hMPV) Not Detected     RVP - Influenza A Not Detected     Influenza A - Z1A3-75 Not Detected     RVP - Influenza B Not Detected     Parainfluenza Not Detected     Respiratory Syncytial VirusVirus (RSV) A Not Detected     Comment: The Respiratory Syncytial Viral assay detects types A and B,  however it does not distinguish between the two.          RVP - Rhinovirus Not Detected     Comment: Cross-Reactivity has been observed between certain   Rhinovirus strains and the Enterovirus assay.  Target Enriched Mulitplex Polymerase Chain Reaction (TEM-PCR)  allows for the detection of multiple pathogens out of a single  reaction.  This test was developed and its performance   characteristics determined by [employer].  It has not   been cleared or approved by the U.S.Food and Drug Administration.  Results should be used in conjunction with clinical findings,   and should not form the sole basis for a diagnosis or treatment  decision.  TEM-PCR is a licensed technology of [employer]         Narrative:       Receiving Lab:->[facility]        All pertinent labs within the past 24 hours have been reviewed.    Significant Imaging: I have reviewed all pertinent imaging results/findings within the past 24 hours.

## 2019-02-15 NOTE — DISCHARGE SUMMARY
[facility]  Lung Transplant  Discharge Summary      Patient Name: Martin Hendrix Jr.  MRN: [id number]  Admission Date: 2/12/2019  Hospital Length of Stay: 3 days  Discharge Date and Time: 02/15/2019 11:03 AM  Attending Physician: Darrick Wolfe MD   Discharging Provider: Patricia Fowler PA-C  Primary Care Provider: Sukhi Dunham Jr, MD     HPI: Mr. Martin Hendrix is a 59 YO male 17 months s/p bilateral lung transplant secondary to diagnosis of sarcoidosis with associated pulmonary hypertension. 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 Patient presents today as a direct admission for IV Foscarnet treatment.      Patient was noted to have an elevated CMV PCR of 8160 log 3.91 on 8/15/2018.  He was started on valganciclovir 900 mg PO BID; and initially his viral load decreased. However, CMV PCR in 12/2018 demonstrated elevating levels, so patient was referred to ID, Dr. Lowry. Patient was noted to have UL97 mutation and was a candidate for a clinic trial study with maribavir. Clinical trial was started on the week of 12/18/2018 and was well tolerated by patient. Viral load decreased to <35 on 1/10 and 1/18/2019; however, patient was noted to have an increasing CMV PCR level to 3500 on 2/6/2019.      Patient reports chronic, dry cough with no associated dyspnea. Patient does report having increased sinus congestion last week that is improving. Patient does note that he did have an associated productive cough with white sputum with the congestion last week. Patient denies any fevers, chills, myalgias, and sick contacts. Patient notes that his hoarseness fluctuates intermittently. Patient does report heartburn that is controlled on his current regimen of Pepcid. Patient also notes intermittent episodes of diarrhea that occur non-frequently. Denies any associated abdominal,  nausea, or vomiting. Denies any chest pain or palpitations.       * No surgery found *     Hospital Course: Patient with uneventful hospital course. Patient tolerated Foscarnet infusions IV well without complications. Creatinine remains stable and magnesium oral supplementation increased upon discharge to counteract hypomagensemia side effect of Foscarnet. Patient will follow up in outpatient lung transplant clinic within the next 3 months as routine CXR PA and Lateral as well as spirometry were obtained during admission. Patient to follow up with ID, Dr. Lowry, and ID provider BETO Almeida as an outpatient.       * Cytomegalovirus (CMV) viremia     Patient was noted to have an elevated CMV PCR of 8160 log 3.91 on 8/15/2018.  He was started on valganciclovir 900 mg PO BID; and initially his viral load decreased. However, CMV PCR in 12/2018 demonstrated elevating levels, so patient was referred to ID, Dr. Lowry. Patient was noted to have UL97 mutation and was a candidate for a clinic trial study with maribavir. Clinical trial was started on the week of 12/18/2018 and was well tolerated by patient. Viral load decreased to <35 on 1/10 and 1/18/2019; however, patient was noted to have an increasing CMV PCR level to 3500 on 2/6/2019.     CMV PCR on 2/12 elevated to 9000s.     Will plan for bi-weekly labs (every Tuesday - CMV PCR, CBC, BMP, Mg, PO4; every Friday - BMP, Mg, PO4) while receiving Foscarnet infusions.     Stop date of Foscarnet 9000 mg IV via PICC every 12 hours to be determined by ID provider recommendations. Will administer 0.9% 500 cc IV bolus prior to each dose of Foscarnet.           Lung replaced by transplant     Patient is 17 months s/p bilateral lung transplant secondary to diagnosis of sarcoidosis with associated pulmonary hypertension. Transplant history complicated by left vocal cord dysfunction, A2 rejection X2 10/17 s/p pulse dose steroids, and A2 rejection 03/2018 s/p thymoglobulin x3  doses.  Will continue current immunosuppression and prophylaxis. Most recent bronchoscopies in 08/2018 and 11/2018 negative for rejection. RVP is negative.     Spirometry reviewed per Dr. Wolfe and is overall stable following a noted increase in FEV1 following thymoglobulin treatment in 2018.   Repeat CXR upon admission stable with no acute process.       Prophylactic antibiotic     Continue Bactrim DS every MWF for PCP/PJP prophylaxis.       Immunosuppression     Continue tacrolimus 1.5 mg BID. Will monitor daily tacrolimus levels and dose adjust accordingly.   Patient has remained off of MMF therapy due to active CMV infection.  Continue prednisone 5 mg daily.       Type 2 diabetes mellitus with hyperglycemia, with long-term current use of insulin     Poorly controlled with HgbA1c of 10.9 upon admission.   Discharge recs per endocrinology: Tresiba 20 units SQ Daily  Humalog 14 units SQ TIDWM  Metformin 500 mg PO BID  POC AC/HS      Endocrinology follow up in 04/2019.   Continue diabetic diet.            Consults (From admission, onward)        Status Ordering Provider     Inpatient consult to Endocrinology  Once     Provider:  (Not yet assigned)    Completed KWAME CARRILLO     Inpatient consult to Infectious Diseases  Once     Provider:  (Not yet assigned)    Completed KWAME CARRILLO     Inpatient consult to PICC team (hospitals)  Once     Provider:  (Not yet assigned)    Completed KWAME CARRILLO     Inpatient consult to Registered Dietitian/Nutritionist  Once     Provider:  (Not yet assigned)    Completed CASSANDRA HANDY          Significant Diagnostic Studies: Labs: All labs within the past 24 hours have been reviewed    Pending Diagnostic Studies:     None        Final Active Diagnoses:    Diagnosis Date Noted POA    PRINCIPAL PROBLEM:  Cytomegalovirus (CMV) viremia [B25.9] 02/12/2019 Yes    Lung replaced by transplant [Z94.2] 08/24/2017 Not Applicable    Immunosuppression [D89.9] 08/22/2017  Yes    Prophylactic antibiotic [Z79.2] 08/22/2017 Not Applicable    Type 2 diabetes mellitus with hyperglycemia, with long-term current use of insulin [E11.65, Z79.4] 11/06/2017 Not Applicable     Chronic    Hypomagnesemia [E83.42]  No    Class 1 obesity with body mass index (BMI) of 30.0 to 30.9 in adult [E66.9, Z68.30] 08/22/2017 Not Applicable    Adrenal cortical steroids causing adverse effect in therapeutic use [T38.0X5A] 08/22/2017 Yes      Problems Resolved During this Admission:      Discharged Condition: stable    Disposition: Home or Self Care    Medications:  Reconciled Home Medications:      Medication List      START taking these medications    foscarnet in dextrose 5 % infusion  Inject 750 mLs (9,000 mg total) into the vein every 12 (twelve) hours.        CHANGE how you take these medications    magnesium oxide 400 mg (241.3 mg magnesium) tablet  Commonly known as:  MAG-OX  Take 2 tablets (800 mg total) by mouth 2 (two) times daily.  What changed:    · how much to take  · Another medication with the same name was removed. Continue taking this medication, and follow the directions you see here.     predniSONE 5 MG tablet  Commonly known as:  DELTASONE  Take 1 tablet (5 mg total) by mouth once daily.  What changed:    · medication strength  · See the new instructions.        CONTINUE taking these medications    amLODIPine 5 MG tablet  Commonly known as:  NORVASC  Take 1 tablet (5 mg total) by mouth once daily.     CALCIUM 600 + D(3) 600 mg(1,500mg) -200 unit Tab  Generic drug:  calcium-vitamin D3  TAKE 1 TABLET BY MOUTH TWICE DAILY     ECOTRIN LOW STRENGTH 81 MG EC tablet  Generic drug:  aspirin  TAKE 1 TABLET DAILY     famotidine 20 MG tablet  Commonly known as:  PEPCID  TAKE 1 TABLET TWICE A DAY     fluticasone 50 mcg/actuation nasal spray  Commonly known as:  FLONASE  1 spray by Each Nare route once daily.     folic acid 1 MG tablet  Commonly known as:  FOLVITE  TAKE 1 TABLET DAILY     furosemide  "40 MG tablet  Commonly known as:  LASIX  Take 1 tablet (40 mg total) by mouth daily as needed.     insulin degludec 200 unit/mL (3 mL) Inpn  Commonly known as:  TRESIBA FLEXTOUCH U-200  Inject 20 Units into the skin every evening.     insulin lispro 100 unit/mL pen  Commonly known as:  HumaLOG KwikPen Insulin  Inject 14 Units into the skin 3 (three) times daily before meals. + correction scale TDD: 60 units     lancets Misc  To check BG 4 times daily, to use with insurance preferred meter     metFORMIN 500 MG 24 hr tablet  Commonly known as:  GLUCOPHAGE-XR  TAKE 2 TABLETS BY MOUTH TWICE DAILY WITH MEALS     multivitamin per tablet  Commonly known as:  THERAGRAN  Take 1 tablet by mouth once daily.     ONETOUCH VERIO Strp  Generic drug:  blood sugar diagnostic  USE TO CHECK BLOOD GLUCOSE FOUR TIMES A DAY, TO USE WITH INSURANCE PREFERRED METER     pen needle, diabetic 32 gauge x 5/32" Ndle  Commonly known as:  BD ULTRA-FINE JIM PEN NEEDLE  Uses 4 times daily, on multiple daily insulin injections     pravastatin 20 MG tablet  Commonly known as:  PRAVACHOL  TAKE 1 TABLET BY MOUTH ONCE DAILY     sulfamethoxazole-trimethoprim 800-160mg 800-160 mg Tab  Commonly known as:  BACTRIM DS  Take 1 tablet by mouth every Mon, Wed, Fri.     tacrolimus 0.5 MG Cap  Commonly known as:  PROGRAF  Take 3 capsules (1.5 mg total) by mouth every 12 (twelve) hours.        STOP taking these medications    ACCU-CHEK DEANNE PLUS METER Misc  Generic drug:  blood-glucose meter     INV [id number] tablet        \  Patient Instructions:      Ambulatory Referral to Infusion Dept   Referral Priority: Routine Referral Type: Consultation   Referral Reason: Specialty Services Required   Requested Specialty: IV Infusion   Number of Visits Requested: 1     Diet diabetic     Notify your health care provider if you experience any of the following:     Notify your health care provider if you experience any of the following:  increased confusion or weakness "     Notify your health care provider if you experience any of the following:  persistent dizziness, light-headedness, or visual disturbances     Notify your health care provider if you experience any of the following:  worsening rash     Notify your health care provider if you experience any of the following:  difficulty breathing or increased cough     Notify your health care provider if you experience any of the following:  severe persistent headache     Notify your health care provider if you experience any of the following:  redness, tenderness, or signs of infection (pain, swelling, redness, odor or green/yellow discharge around incision site)     Notify your health care provider if you experience any of the following:  severe uncontrolled pain     Notify your health care provider if you experience any of the following:  persistent nausea and vomiting or diarrhea     Notify your health care provider if you experience any of the following:  temperature >100.4     Activity as tolerated     Follow Up:  Follow-up Information     Darrick Wolfe MD In 3 months.    Specialty:  Transplant  Contact information:  [address] [facility] [id number] [phone]                   Patricia Fowler PA-C  Lung Transplant  [facility]

## 2019-02-15 NOTE — SUBJECTIVE & OBJECTIVE
Interval History: pt seen, sitting n chair, no complaints, no fevers, no cough, no N/V/D    Review of Systems   Constitutional: Negative for appetite change, chills, diaphoresis and fever.   HENT: Negative for congestion, postnasal drip, rhinorrhea, sinus pain and voice change.    Eyes: Negative for discharge and redness.   Respiratory: Negative for cough, shortness of breath and wheezing.    Cardiovascular: Negative for chest pain, palpitations and leg swelling.   Gastrointestinal: Negative for abdominal distention, abdominal pain, constipation, diarrhea, nausea and vomiting.   Endocrine: Negative for polydipsia and polyuria.   Genitourinary: Negative for dysuria, frequency and urgency.   Musculoskeletal: Negative for arthralgias, back pain, myalgias and neck pain.   Skin: Negative for pallor and rash.   Neurological: Negative for dizziness, light-headedness and headaches.   Psychiatric/Behavioral: Negative for agitation, confusion and hallucinations.     Objective:     Vital Signs (Most Recent):  Temp: 97.8 °F (36.6 °C) (02/14/19 1643)  Pulse: 85 (02/14/19 1757)  Resp: 19 (02/14/19 1520)  BP: (!) 140/101 (02/14/19 1520)  SpO2: 98 % (02/14/19 1757) Vital Signs (24h Range):  Temp:  [97.8 °F (36.6 °C)-98.6 °F (37 °C)] 97.8 °F (36.6 °C)  Pulse:  [77-91] 85  Resp:  [16-20] 19  SpO2:  [95 %-99 %] 98 %  BP: (123-140)/() 140/101     Weight: 102 kg (224 lb 13.9 oz)  Body mass index is 31.36 kg/m².    Estimated Creatinine Clearance: 111.5 mL/min (based on SCr of 0.9 mg/dL).    Physical Exam   Constitutional: He is oriented to person, place, and time. He appears well-developed and well-nourished. No distress.   HENT:   Head: Normocephalic and atraumatic.   Right Ear: External ear normal.   Left Ear: External ear normal.   Nose: Nose normal.   Mouth/Throat: Oropharynx is clear and moist.   Eyes: Conjunctivae and EOM are normal. Right eye exhibits no discharge. Left eye exhibits no discharge.   Neck: Normal range of  motion. Neck supple.   Cardiovascular: Normal rate, regular rhythm and normal heart sounds. Exam reveals no gallop and no friction rub.   No murmur heard.  Pulmonary/Chest: Effort normal. He has no wheezes. He has no rhonchi. He has no rales.   Well-healed mid-line sternotomy incision   Abdominal: Soft. He exhibits no distension. There is no tenderness.   Musculoskeletal: Normal range of motion. He exhibits no edema.   Neurological: He is alert and oriented to person, place, and time.   Skin: Skin is warm and dry. He is not diaphoretic. No erythema.   Psychiatric: He has a normal mood and affect. His behavior is normal.   Nursing note and vitals reviewed.      Significant Labs:   CBC:   Recent Labs   Lab 02/12/19  1840 02/13/19  0636 02/14/19  0600   WBC 6.47 5.16 7.22   HGB 11.2* 12.8* 12.0*   HCT 34.1* 41.3 37.0*    218 282     CMP:   Recent Labs   Lab 02/12/19 1840 02/13/19  0636 02/14/19  0600     136 138 141   K 4.3  4.3 4.2 3.9     101 102 102   CO2 27  27 26 30*   *  267* 184* 132*   BUN 18  18 14 11   CREATININE 1.1  1.1 0.8 0.9   CALCIUM 9.4  9.4 9.8 9.6   PROT 7.0 7.2 6.9   ALBUMIN 3.8 3.7 3.6   BILITOT 0.5 0.6 0.6   ALKPHOS 86 85 78   AST 22 26 20   ALT 26 26 27   ANIONGAP 8  8 10 9   EGFRNONAA >60.0  >60.0 >60.0 >60.0     Microbiology Results (last 7 days)     Procedure Component Value Units Date/Time    Respiratory Viral Panel by PCR [facility]; Nasal Swab [[id number]] Collected:  02/12/19 2024    Order Status:  Completed Specimen:  Respiratory Updated:  02/14/19 1231     Respiratory Virus Panel, source Nasal Swab     RVP - Adenovirus Not Detected     Comment: Detects Serotypes B and E. Detection of Serotype C may   be limited. If Adenovirus infection is suspected and a   Not Detected result is returned the sample should be   re-tested for Adenovirus using an independent method  (e.g. [place] Adenovirus Quantitative Real-Time  PCR test.          Enterovirus Not  Detected     Comment: Cross-reactivity has been observed between certain Rhinovirus  strains and the Enterovirus assay.          Human Bocavirus Not Detected     Human Coronavirus Not Detected     Comment: The Human Coronavirus assay detects Human coronavirus types  229E, OC43,NL63 and HKU1.          RVP - Human Metapneumovirus (hMPV) Not Detected     RVP - Influenza A Not Detected     Influenza A - O3X0-26 Not Detected     RVP - Influenza B Not Detected     Parainfluenza Not Detected     Respiratory Syncytial VirusVirus (RSV) A Not Detected     Comment: The Respiratory Syncytial Viral assay detects types A and B,  however it does not distinguish between the two.          RVP - Rhinovirus Not Detected     Comment: Cross-Reactivity has been observed between certain   Rhinovirus strains and the Enterovirus assay.  Target Enriched Mulitplex Polymerase Chain Reaction (TEM-PCR)  allows for the detection of multiple pathogens out of a single  reaction.  This test was developed and its performance   characteristics determined by [employer].  It has not   been cleared or approved by the U.S.Food and Drug Administration.  Results should be used in conjunction with clinical findings,   and should not form the sole basis for a diagnosis or treatment  decision.  TEM-PCR is a licensed technology of [employer].         Narrative:       Receiving Lab:->[facility]        All pertinent labs within the past 24 hours have been reviewed.    Significant Imaging: I have reviewed all pertinent imaging results/findings within the past 24 hours.

## 2019-02-15 NOTE — PROGRESS NOTES
Discharge to home was postponed to today while awaiting insurance clearance for outpatient Foscarnet therapy.  Medication has been approved so patient will be discharged following another education session with the [facility] infusion nurse.  Met with patient and reviewed infusion regimen and all appointments as discussed yesterday.  Patient repeated all information back to me correctly and demonstrated his readiness for discharge.

## 2019-02-19 ENCOUNTER — OFFICE VISIT (OUTPATIENT)
Dept: INFECTIOUS DISEASES | Facility: CLINIC | Age: 59
End: 2019-02-19
Payer: COMMERCIAL

## 2019-02-19 ENCOUNTER — PATIENT MESSAGE (OUTPATIENT)
Dept: TRANSPLANT | Facility: CLINIC | Age: 59
End: 2019-02-19

## 2019-02-19 ENCOUNTER — OFFICE VISIT (OUTPATIENT)
Dept: INFECTIOUS DISEASES | Facility: CLINIC | Age: 59
End: 2019-02-19

## 2019-02-19 ENCOUNTER — LAB VISIT (OUTPATIENT)
Dept: LAB | Facility: HOSPITAL | Age: 59
End: 2019-02-19
Payer: COMMERCIAL

## 2019-02-19 VITALS
WEIGHT: 224 LBS | TEMPERATURE: 98 F | HEIGHT: 71 IN | DIASTOLIC BLOOD PRESSURE: 85 MMHG | BODY MASS INDEX: 31.36 KG/M2 | HEART RATE: 96 BPM | SYSTOLIC BLOOD PRESSURE: 137 MMHG

## 2019-02-19 DIAGNOSIS — E83.39 HYPOPHOSPHATEMIA: ICD-10-CM

## 2019-02-19 DIAGNOSIS — Z94.2 LUNG REPLACED BY TRANSPLANT: ICD-10-CM

## 2019-02-19 DIAGNOSIS — E83.42 HYPOMAGNESEMIA: ICD-10-CM

## 2019-02-19 DIAGNOSIS — Z00.6 RESEARCH STUDY PATIENT: ICD-10-CM

## 2019-02-19 DIAGNOSIS — D84.9 IMMUNOSUPPRESSION: ICD-10-CM

## 2019-02-19 DIAGNOSIS — B25.9 CYTOMEGALOVIRUS INFECTION, UNSPECIFIED CYTOMEGALOVIRAL INFECTION TYPE: Primary | ICD-10-CM

## 2019-02-19 DIAGNOSIS — Z79.2 RECEIVING INTRAVENOUS ANTIBIOTIC TREATMENT AT HOME: ICD-10-CM

## 2019-02-19 DIAGNOSIS — B25.8 OTHER CYTOMEGALOVIRAL DISEASES: Primary | ICD-10-CM

## 2019-02-19 DIAGNOSIS — Z94.2 LUNG TRANSPLANTED: ICD-10-CM

## 2019-02-19 DIAGNOSIS — B25.9 CYTOMEGALOVIRUS INFECTION, UNSPECIFIED CYTOMEGALOVIRAL INFECTION TYPE: ICD-10-CM

## 2019-02-19 DIAGNOSIS — D86.0 SARCOIDOSIS OF LUNG: ICD-10-CM

## 2019-02-19 LAB
ANION GAP SERPL CALC-SCNC: 10 MMOL/L
BASOPHILS # BLD AUTO: 0.08 K/UL
BASOPHILS NFR BLD: 0.9 %
BUN SERPL-MCNC: 15 MG/DL
CALCIUM SERPL-MCNC: 11.7 MG/DL
CHLORIDE SERPL-SCNC: 99 MMOL/L
CO2 SERPL-SCNC: 30 MMOL/L
CREAT SERPL-MCNC: 1.3 MG/DL
DIFFERENTIAL METHOD: ABNORMAL
EOSINOPHIL # BLD AUTO: 0.2 K/UL
EOSINOPHIL NFR BLD: 2.5 %
ERYTHROCYTE [DISTWIDTH] IN BLOOD BY AUTOMATED COUNT: 14 %
EST. GFR  (AFRICAN AMERICAN): >60 ML/MIN/1.73 M^2
EST. GFR  (NON AFRICAN AMERICAN): >60 ML/MIN/1.73 M^2
GLUCOSE SERPL-MCNC: 170 MG/DL
HCT VFR BLD AUTO: 36.2 %
HGB BLD-MCNC: 11.6 G/DL
IMM GRANULOCYTES # BLD AUTO: 0.04 K/UL
IMM GRANULOCYTES NFR BLD AUTO: 0.4 %
LYMPHOCYTES # BLD AUTO: 0.9 K/UL
LYMPHOCYTES NFR BLD: 10.4 %
MAGNESIUM SERPL-MCNC: 1.1 MG/DL
MCH RBC QN AUTO: 29.8 PG
MCHC RBC AUTO-ENTMCNC: 32 G/DL
MCV RBC AUTO: 93 FL
MONOCYTES # BLD AUTO: 0.7 K/UL
MONOCYTES NFR BLD: 7.5 %
NEUTROPHILS # BLD AUTO: 7.1 K/UL
NEUTROPHILS NFR BLD: 78.3 %
NRBC BLD-RTO: 0 /100 WBC
PHOSPHATE SERPL-MCNC: 5.9 MG/DL
PLATELET # BLD AUTO: 294 K/UL
PMV BLD AUTO: 8.6 FL
POTASSIUM SERPL-SCNC: 4 MMOL/L
RBC # BLD AUTO: 3.89 M/UL
SODIUM SERPL-SCNC: 139 MMOL/L
WBC # BLD AUTO: 9.03 K/UL

## 2019-02-19 PROCEDURE — 85025 COMPLETE CBC W/AUTO DIFF WBC: CPT

## 2019-02-19 PROCEDURE — 99999 PR PBB SHADOW E&M-EST. PATIENT-LVL III: ICD-10-PCS | Mod: PBBFAC,,, | Performed by: INTERNAL MEDICINE

## 2019-02-19 PROCEDURE — 99215 OFFICE O/P EST HI 40 MIN: CPT | Mod: S$GLB,,, | Performed by: INTERNAL MEDICINE

## 2019-02-19 PROCEDURE — 99999 PR PBB SHADOW E&M-EST. PATIENT-LVL III: ICD-10-PCS | Mod: PBBFAC,,, | Performed by: CLINICAL NURSE SPECIALIST

## 2019-02-19 PROCEDURE — 83735 ASSAY OF MAGNESIUM: CPT

## 2019-02-19 PROCEDURE — 99999 PR PBB SHADOW E&M-EST. PATIENT-LVL III: CPT | Mod: PBBFAC,,, | Performed by: INTERNAL MEDICINE

## 2019-02-19 PROCEDURE — 99999 PR PBB SHADOW E&M-EST. PATIENT-LVL III: CPT | Mod: PBBFAC,,, | Performed by: CLINICAL NURSE SPECIALIST

## 2019-02-19 PROCEDURE — 99499 NO LOS: ICD-10-PCS | Mod: S$GLB,,, | Performed by: CLINICAL NURSE SPECIALIST

## 2019-02-19 PROCEDURE — 36415 COLL VENOUS BLD VENIPUNCTURE: CPT

## 2019-02-19 PROCEDURE — 99215 PR OFFICE/OUTPT VISIT, EST, LEVL V, 40-54 MIN: ICD-10-PCS | Mod: S$GLB,,, | Performed by: INTERNAL MEDICINE

## 2019-02-19 PROCEDURE — 99499 UNLISTED E&M SERVICE: CPT | Mod: S$GLB,,, | Performed by: CLINICAL NURSE SPECIALIST

## 2019-02-19 PROCEDURE — 80048 BASIC METABOLIC PNL TOTAL CA: CPT

## 2019-02-19 PROCEDURE — 84100 ASSAY OF PHOSPHORUS: CPT

## 2019-02-19 NOTE — TELEPHONE ENCOUNTER
Received written orders from Dr. Wolfe instructing patient to increase magnesium oxide to TID from BID.  My Chart message sent to patient instructing him to make the above frequency change.  Asked patient to contact us if he had any questions.

## 2019-02-19 NOTE — PROGRESS NOTES
Roman Refractory or Resistant CMV Treatment Study  Protocol: [id number]  IRB# [id number]  PI: Jaron ALY)  Site: 002     VISIT 12/ WEEK 10:       Date of Visit: 02/19/2019     Mr. Martin Hendrix presents to clinic for Visit 12/Week 10 for Roman CMV treatment study. Patient is now in follow up phase of study. Received Maribavir 400mg po BID for 8 weeks as per study protocol that completed on 2/6/19.      CMV viral load had been less than <35 on 1/18/19, but then began increasing again 2/1/19. Patient was admitted to hospital on 2/12/19 to begin treatment for CMV viremia with Foscarnet. First dose given 2/12/19. Patient tolerating infusions well.      No Adverse events assessed, no episodes of rejection assessed, no CMV invasive disease or CMV syndrome assessed. No new problems or complaints. Denies fever, chills.      Plan:  1. Study labs drawn and processed per protocol.  2. Next research study visit with labs prior on 02/26/2019.   3. Call clinic with any questions/concerns.

## 2019-02-19 NOTE — TELEPHONE ENCOUNTER
----- Message from Darrick Wolfe MD sent at 2/19/2019 11:14 AM CST -----  Increase his mag ox to tid

## 2019-02-19 NOTE — PROGRESS NOTES
Subjective:      Patient ID: Martin Hendrix Jr. is a 58 y.o. male.    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    Patient was admitted 2/12/2019-2/15/2019 for initiation of foscarnet therapy. Patient has been doing well on foscarnet therapy.  Denied any adverse reactions.  No fevers, chills, sweats, n/v/d, abdominal pain, SOB, CP, hematuria, dysuria, rashes.    On tacro and pred, MMF remains on hold.    Review of Systems   Constitution: Negative for chills, decreased appetite, fever, weakness, malaise/fatigue, night sweats, weight gain and weight loss.   HENT: Negative for congestion, ear pain, hearing loss, hoarse voice, sore throat and tinnitus.    Eyes: Negative for blurred vision, redness and visual disturbance.   Cardiovascular: Negative for chest pain, leg swelling and palpitations.   Respiratory: Negative for cough, hemoptysis, shortness of breath and sputum production.    Hematologic/Lymphatic: Negative for adenopathy. Does not bruise/bleed easily.   Skin: Negative for dry skin, itching, rash and suspicious lesions.   Musculoskeletal: Negative for back pain, joint pain, myalgias and neck pain.   Gastrointestinal: Negative for abdominal pain, constipation, diarrhea, heartburn, nausea and vomiting.   Genitourinary: Negative for dysuria, flank pain, frequency, hematuria, hesitancy and urgency.   Neurological: Positive for headaches. Negative for dizziness, numbness and paresthesias.   Psychiatric/Behavioral: Negative for depression and memory loss. The  patient does not have insomnia and is not nervous/anxious.      Objective:   Physical Exam   Constitutional: He is oriented to person, place, and time. He appears well-developed and well-nourished. No distress.   HENT:   Head: Normocephalic and atraumatic.   Eyes: Conjunctivae and EOM are normal.   Neck: Normal range of motion. Neck supple.   Pulmonary/Chest: Effort normal and breath sounds normal. No respiratory distress. He has no wheezes. He has no rales.   Abdominal: Soft. He exhibits no distension.   Musculoskeletal: Normal range of motion. He exhibits no edema.   Neurological: He is alert and oriented to person, place, and time.   Skin: Skin is warm and dry. No rash noted. He is not diaphoretic. No erythema.   Psychiatric: He has a normal mood and affect. His behavior is normal.     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 He is stable and tolerating FOS induction well.    Plan:   - Continue foscarnet  - Twice weekly labs - CBC with diff, CMP, CMV VL    RTC 1 week    Yue Lowry MD MPH  [facility]

## 2019-02-20 LAB — CMV DNA SERPL NAA+PROBE-ACNC: 1900 IU/ML

## 2019-02-20 RX ORDER — LANOLIN ALCOHOL/MO/W.PET/CERES
2 CREAM (GRAM) TOPICAL 3 TIMES DAILY
Qty: 180 TABLET | Refills: 11 | Status: ON HOLD | OUTPATIENT
Start: 2019-02-20 | End: 2019-03-13 | Stop reason: SDUPTHER

## 2019-02-21 ENCOUNTER — PATIENT MESSAGE (OUTPATIENT)
Dept: INFECTIOUS DISEASES | Facility: CLINIC | Age: 59
End: 2019-02-21

## 2019-02-22 ENCOUNTER — LAB VISIT (OUTPATIENT)
Dept: LAB | Facility: HOSPITAL | Age: 59
End: 2019-02-22
Attending: INTERNAL MEDICINE
Payer: COMMERCIAL

## 2019-02-22 DIAGNOSIS — Z94.2 LUNG REPLACED BY TRANSPLANT: ICD-10-CM

## 2019-02-22 DIAGNOSIS — E83.42 HYPOMAGNESEMIA: ICD-10-CM

## 2019-02-22 DIAGNOSIS — E83.39 HYPOPHOSPHATEMIA: ICD-10-CM

## 2019-02-22 LAB
ANION GAP SERPL CALC-SCNC: 7 MMOL/L
BUN SERPL-MCNC: 13 MG/DL
CALCIUM SERPL-MCNC: 11.7 MG/DL
CHLORIDE SERPL-SCNC: 100 MMOL/L
CO2 SERPL-SCNC: 34 MMOL/L
CREAT SERPL-MCNC: 1.3 MG/DL
EST. GFR  (AFRICAN AMERICAN): >60 ML/MIN/1.73 M^2
EST. GFR  (NON AFRICAN AMERICAN): >60 ML/MIN/1.73 M^2
GLUCOSE SERPL-MCNC: 159 MG/DL
MAGNESIUM SERPL-MCNC: 1.1 MG/DL
PHOSPHATE SERPL-MCNC: 4.3 MG/DL
POTASSIUM SERPL-SCNC: 3.6 MMOL/L
SODIUM SERPL-SCNC: 141 MMOL/L

## 2019-02-22 PROCEDURE — 36415 COLL VENOUS BLD VENIPUNCTURE: CPT | Mod: PO

## 2019-02-22 PROCEDURE — 84100 ASSAY OF PHOSPHORUS: CPT

## 2019-02-22 PROCEDURE — 83735 ASSAY OF MAGNESIUM: CPT

## 2019-02-22 PROCEDURE — 80048 BASIC METABOLIC PNL TOTAL CA: CPT

## 2019-02-26 ENCOUNTER — DOCUMENTATION ONLY (OUTPATIENT)
Dept: TRANSPLANT | Facility: CLINIC | Age: 59
End: 2019-02-26

## 2019-02-26 ENCOUNTER — OFFICE VISIT (OUTPATIENT)
Dept: INFECTIOUS DISEASES | Facility: CLINIC | Age: 59
End: 2019-02-26

## 2019-02-26 ENCOUNTER — TELEPHONE (OUTPATIENT)
Dept: TRANSPLANT | Facility: CLINIC | Age: 59
End: 2019-02-26

## 2019-02-26 ENCOUNTER — LAB VISIT (OUTPATIENT)
Dept: LAB | Facility: HOSPITAL | Age: 59
End: 2019-02-26
Payer: COMMERCIAL

## 2019-02-26 ENCOUNTER — OFFICE VISIT (OUTPATIENT)
Dept: INFECTIOUS DISEASES | Facility: CLINIC | Age: 59
End: 2019-02-26
Payer: COMMERCIAL

## 2019-02-26 VITALS
HEART RATE: 91 BPM | DIASTOLIC BLOOD PRESSURE: 86 MMHG | WEIGHT: 222.44 LBS | SYSTOLIC BLOOD PRESSURE: 149 MMHG | BODY MASS INDEX: 31.14 KG/M2 | TEMPERATURE: 99 F | HEIGHT: 71 IN

## 2019-02-26 DIAGNOSIS — Z79.2 RECEIVING INTRAVENOUS ANTIBIOTIC TREATMENT AT HOME: ICD-10-CM

## 2019-02-26 DIAGNOSIS — E87.6 HYPOKALEMIA: Primary | ICD-10-CM

## 2019-02-26 DIAGNOSIS — B25.8 OTHER CYTOMEGALOVIRAL DISEASES: Primary | ICD-10-CM

## 2019-02-26 DIAGNOSIS — B25.9 CYTOMEGALOVIRUS INFECTION, UNSPECIFIED CYTOMEGALOVIRAL INFECTION TYPE: ICD-10-CM

## 2019-02-26 DIAGNOSIS — E83.39 HYPOPHOSPHATEMIA: ICD-10-CM

## 2019-02-26 DIAGNOSIS — Z94.2 LUNG REPLACED BY TRANSPLANT: ICD-10-CM

## 2019-02-26 DIAGNOSIS — D84.9 IMMUNOSUPPRESSION: ICD-10-CM

## 2019-02-26 DIAGNOSIS — Z00.6 RESEARCH STUDY PATIENT: ICD-10-CM

## 2019-02-26 DIAGNOSIS — Z79.4 TYPE 2 DIABETES MELLITUS WITH HYPERGLYCEMIA, WITH LONG-TERM CURRENT USE OF INSULIN: Chronic | ICD-10-CM

## 2019-02-26 DIAGNOSIS — E83.42 HYPOMAGNESEMIA: Primary | ICD-10-CM

## 2019-02-26 DIAGNOSIS — E11.65 TYPE 2 DIABETES MELLITUS WITH HYPERGLYCEMIA, WITH LONG-TERM CURRENT USE OF INSULIN: Chronic | ICD-10-CM

## 2019-02-26 DIAGNOSIS — E83.42 HYPOMAGNESEMIA: ICD-10-CM

## 2019-02-26 DIAGNOSIS — B25.9 CYTOMEGALOVIRUS INFECTION, UNSPECIFIED CYTOMEGALOVIRAL INFECTION TYPE: Primary | ICD-10-CM

## 2019-02-26 DIAGNOSIS — Z94.2 LUNG TRANSPLANTED: ICD-10-CM

## 2019-02-26 LAB
ANION GAP SERPL CALC-SCNC: 11 MMOL/L
BASOPHILS # BLD AUTO: 0.07 K/UL
BASOPHILS NFR BLD: 0.9 %
BUN SERPL-MCNC: 14 MG/DL
CALCIUM SERPL-MCNC: 11.8 MG/DL
CHLORIDE SERPL-SCNC: 95 MMOL/L
CO2 SERPL-SCNC: 34 MMOL/L
CREAT SERPL-MCNC: 1.4 MG/DL
DIFFERENTIAL METHOD: ABNORMAL
DRUG STUDY SPECIMEN TYPE: NORMAL
DRUG STUDY TEST NAME: NORMAL
DRUG STUDY TEST RESULT: NORMAL
EOSINOPHIL # BLD AUTO: 0.2 K/UL
EOSINOPHIL NFR BLD: 2.6 %
ERYTHROCYTE [DISTWIDTH] IN BLOOD BY AUTOMATED COUNT: 14.4 %
EST. GFR  (AFRICAN AMERICAN): >60 ML/MIN/1.73 M^2
EST. GFR  (NON AFRICAN AMERICAN): 55.8 ML/MIN/1.73 M^2
GLUCOSE SERPL-MCNC: 194 MG/DL
HCT VFR BLD AUTO: 34.2 %
HGB BLD-MCNC: 11.1 G/DL
IMM GRANULOCYTES # BLD AUTO: 0.05 K/UL
IMM GRANULOCYTES NFR BLD AUTO: 0.7 %
LYMPHOCYTES # BLD AUTO: 0.8 K/UL
LYMPHOCYTES NFR BLD: 10.6 %
MAGNESIUM SERPL-MCNC: 0.7 MG/DL
MCH RBC QN AUTO: 29.5 PG
MCHC RBC AUTO-ENTMCNC: 32.5 G/DL
MCV RBC AUTO: 91 FL
MONOCYTES # BLD AUTO: 0.6 K/UL
MONOCYTES NFR BLD: 8.3 %
NEUTROPHILS # BLD AUTO: 5.7 K/UL
NEUTROPHILS NFR BLD: 76.9 %
NRBC BLD-RTO: 0 /100 WBC
PHOSPHATE SERPL-MCNC: 3.1 MG/DL
PLATELET # BLD AUTO: 337 K/UL
PMV BLD AUTO: 8.7 FL
POTASSIUM SERPL-SCNC: 2.9 MMOL/L
RBC # BLD AUTO: 3.76 M/UL
SODIUM SERPL-SCNC: 140 MMOL/L
WBC # BLD AUTO: 7.44 K/UL

## 2019-02-26 PROCEDURE — 85025 COMPLETE CBC W/AUTO DIFF WBC: CPT

## 2019-02-26 PROCEDURE — 84100 ASSAY OF PHOSPHORUS: CPT

## 2019-02-26 PROCEDURE — 99999 PR PBB SHADOW E&M-EST. PATIENT-LVL III: ICD-10-PCS | Mod: PBBFAC,,, | Performed by: INTERNAL MEDICINE

## 2019-02-26 PROCEDURE — 99999 PR PBB SHADOW E&M-EST. PATIENT-LVL III: CPT | Mod: PBBFAC,,, | Performed by: CLINICAL NURSE SPECIALIST

## 2019-02-26 PROCEDURE — 99999 PR PBB SHADOW E&M-EST. PATIENT-LVL III: ICD-10-PCS | Mod: PBBFAC,,, | Performed by: CLINICAL NURSE SPECIALIST

## 2019-02-26 PROCEDURE — 83735 ASSAY OF MAGNESIUM: CPT

## 2019-02-26 PROCEDURE — 99215 PR OFFICE/OUTPT VISIT, EST, LEVL V, 40-54 MIN: ICD-10-PCS | Mod: S$GLB,,, | Performed by: INTERNAL MEDICINE

## 2019-02-26 PROCEDURE — 99000 SPECIMEN HANDLING OFFICE-LAB: CPT

## 2019-02-26 PROCEDURE — 36415 COLL VENOUS BLD VENIPUNCTURE: CPT

## 2019-02-26 PROCEDURE — 99499 UNLISTED E&M SERVICE: CPT | Mod: S$GLB,,, | Performed by: CLINICAL NURSE SPECIALIST

## 2019-02-26 PROCEDURE — 99499 NO LOS: ICD-10-PCS | Mod: S$GLB,,, | Performed by: CLINICAL NURSE SPECIALIST

## 2019-02-26 PROCEDURE — 99999 PR PBB SHADOW E&M-EST. PATIENT-LVL III: CPT | Mod: PBBFAC,,, | Performed by: INTERNAL MEDICINE

## 2019-02-26 PROCEDURE — 99215 OFFICE O/P EST HI 40 MIN: CPT | Mod: S$GLB,,, | Performed by: INTERNAL MEDICINE

## 2019-02-26 PROCEDURE — 80048 BASIC METABOLIC PNL TOTAL CA: CPT

## 2019-02-26 RX ORDER — POTASSIUM CHLORIDE 20 MEQ/1
20 TABLET, EXTENDED RELEASE ORAL DAILY
Qty: 2 TABLET | Refills: 0 | Status: ON HOLD | OUTPATIENT
Start: 2019-02-26 | End: 2019-03-13 | Stop reason: HOSPADM

## 2019-02-26 RX ORDER — POTASSIUM CHLORIDE 750 MG/1
10 TABLET, EXTENDED RELEASE ORAL DAILY
Qty: 30 TABLET | Refills: 0 | Status: ON HOLD | OUTPATIENT
Start: 2019-02-28 | End: 2019-03-13 | Stop reason: HOSPADM

## 2019-02-26 NOTE — PROGRESS NOTES
Roman Refractory or Resistant CMV Treatment Study  Protocol: [id number]  IRB# [id number]  PI: Jaron ALY)  Site: 002     VISIT 13/ WEEK 11:       Date of Visit: 02/26/2019     Mr. Martin Hendrix presents to clinic for Visit 13/Week 11 for Roman CMV treatment study. Patient is now in follow up phase of study. Received Maribavir 400mg po BID for 8 weeks as per study protocol that completed on 2/6/19.      CMV viral load had been less than <35 on 1/18/19, but then began increasing again 2/1/19. Patient was admitted to hospital on 2/12/19 to begin treatment for CMV viremia with Foscarnet. First dose given 2/12/19, currently receiving at home infusion via PICC twice a day. Reports he is feeling fatigued the last few days that is assessed to be related to CMV viremia and Foscarnet. Patient has follow up with Dr. Lowry and labs today.      No Adverse events assessed, no episodes of rejection assessed, no CMV invasive disease or CMV syndrome assessed. Denies fever, chills.      Plan:  1. Study labs drawn and processed per protocol.  2. Next research study visit with labs at visit on 03/07/2019.   3. Call clinic with any questions/concerns.

## 2019-02-26 NOTE — PROGRESS NOTES
Subjective:      Patient ID: Martin Hendrix Jr. is a 58 y.o. male.    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     Patient was admitted 2/12/2019-2/15/2019 for initiation of foscarnet therapy. Patient has been doing well on foscarnet therapy.  Reports feeling fatigued.  Mag was low, recently increased mag oxide to TID.  Notes few episodes of diarrhea.  No fevers, chills, sweats, n/v/d, abdominal pain, SOB, CP, hematuria, dysuria, rashes.     On tacro and pred, MMF remains on hold.      Review of Systems   Constitution: Positive for malaise/fatigue. Negative for chills, decreased appetite, fever, weakness, night sweats, weight gain and weight loss.   HENT: Negative for congestion, ear pain, hearing loss, hoarse voice, sore throat and tinnitus.    Eyes: Negative for blurred vision, redness and visual disturbance.   Cardiovascular: Negative for chest pain, leg swelling and palpitations.   Respiratory: Negative for cough, hemoptysis, shortness of breath and sputum production.    Hematologic/Lymphatic: Negative for adenopathy. Does not bruise/bleed easily.   Skin: Negative for dry skin, itching, rash and suspicious lesions.   Musculoskeletal: Negative for back pain, joint pain, myalgias and neck pain.   Gastrointestinal: Positive for diarrhea. Negative for abdominal pain, constipation, heartburn, nausea and vomiting.   Genitourinary: Negative for dysuria, flank pain, frequency, hematuria, hesitancy and urgency.   Neurological: Negative for dizziness, headaches, numbness and  paresthesias.   Psychiatric/Behavioral: Negative for depression and memory loss. The patient does not have insomnia and is not nervous/anxious.      Objective:   Physical Exam   Constitutional: He is oriented to person, place, and time. He appears well-developed and well-nourished. No distress.   HENT:   Head: Normocephalic and atraumatic.   Eyes: Conjunctivae and EOM are normal.   Neck: Normal range of motion. Neck supple.   Pulmonary/Chest: Effort normal. No respiratory distress.   Abdominal: Soft. He exhibits no distension.   Musculoskeletal: Normal range of motion. He exhibits no edema.   Neurological: He is alert and oriented to person, place, and time.   Skin: Skin is warm and dry. No rash noted. He is not diaphoretic. No erythema.   Psychiatric: He has a normal mood and affect. His behavior is normal.   Vitals reviewed.    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 He is stable and tolerating FOS induction well.    Plan:   - Continue foscarnet  - Twice weekly labs - CBC with diff, CMP, CMV VL     RTC 1 week     Yue Lowry MD MPH  [facility]

## 2019-02-26 NOTE — TELEPHONE ENCOUNTER
Contacted patient notifying him that his magnesium is low today.  Asked if patient started to take the magnesium 3 times a day as instructed.  Patient states that he took it the first couple of days at three times a day then went back to twice a day.  Instructed patient again to take magnesium 800 mg three times a day as prescribed.  Patient verbalized understanding and asked about his other labs.  Informed patient that his other labs are still pending and will let him know of the results when they are available.  Patient did not have any further questions at this time.

## 2019-02-26 NOTE — PROGRESS NOTES
Received critical result from Lamar from lab.  Magnesium 0.7 was reported and received.  Staff message sent to Dr. Wolfe notifying him of the above.

## 2019-02-26 NOTE — TELEPHONE ENCOUNTER
----- Message from Thony Lyles PharmD sent at 2/26/2019 12:19 PM CST -----  Regarding: RE: dosing  IV magnesium should be 4g with his current level.    Oral potassium can be 20 mEq x 2.    ----- Message -----  From: Danielle BRICEÑO Do, RN  Sent: 2/26/2019  11:52 AM  To: Thony Lyles PharmD  Subject: dosing                                           Need IV magnesium dose and oral potassium    He is currently receiving fluids before the foscarnet

## 2019-02-26 NOTE — TELEPHONE ENCOUNTER
Received torb from Dr. Wolfe instructing patient to receive IV magnesium with [facility] and to start oral potassium.  Received dose recommendations and approved by Dr. Wolfe to start Magnesium 4 g IV once and oral potassium 20 meq x 2 doses then maintenance at 10 meq daily.     Patient contacted and notified of the above new medications and instructions.  Patient verbalized understanding and did not have any questions at this time.

## 2019-02-27 NOTE — TELEPHONE ENCOUNTER
----- Message from Justino Melton sent at 2/27/2019 12:03 PM CST -----  Contact: Linda/[facility]  Please call Linda at [phone]    Need clarification for an infusion order that was faxed    Thank you

## 2019-02-27 NOTE — TELEPHONE ENCOUNTER
Returned call to Linda from [facility].  Asked Linda to have patient receive Magnesium IV in facility to be monitored while receiving medication.  Linda states that she will schedule patient for 2/28/19 for Mg infusion.

## 2019-02-28 ENCOUNTER — TELEPHONE (OUTPATIENT)
Dept: TRANSPLANT | Facility: CLINIC | Age: 59
End: 2019-02-28

## 2019-02-28 LAB — CMV DNA SERPL NAA+PROBE-ACNC: 357 IU/ML

## 2019-02-28 NOTE — TELEPHONE ENCOUNTER
Contacted patient to give him an updated plan.  Patient is receiving Mg infusion at [facility] now.  Informed patient that he will have his labs tomorrow as scheduled.  Once the lab results, we will check his Mg level and if the level is still low, he will receive another dose of Mg with [facility] tomorrow.  [facility] will contact him tomorrow once they hear from us to have him come in for another dose of Mg.  Patient has not picked up his Klor-con yet but plans to do so today.  Asked patient to start the Klor-con as soon as possible.  Informed patient that Sera will be here tomorrow if he had any questions or concerns.  Patient verbalized understanding of the above and did not have any further questions at this time.

## 2019-02-28 NOTE — TELEPHONE ENCOUNTER
Spoke to Linda from [facility] regarding patient's Mg infusion.  Informed Linda that patient may need another dose on Friday 3/1/19 after patient has his labs done and the Mg level results.  Asked Linda to be prepared to have patient come in on 3/1/19 for another dose of Mg if necessary.  Informed Linda that patient will have labs done in the morning and Sera will contact her to let her know if the Mg infusion for Friday is necessary.  Linda stated that she will contact Sera on 3/1/19 to follow up on Mg level and if the Mg infusion is needed.  Both Linda and GUZMAN agreed to the plan.

## 2019-03-01 ENCOUNTER — TELEPHONE (OUTPATIENT)
Dept: TRANSPLANT | Facility: CLINIC | Age: 59
End: 2019-03-01

## 2019-03-01 ENCOUNTER — LAB VISIT (OUTPATIENT)
Dept: LAB | Facility: HOSPITAL | Age: 59
End: 2019-03-01
Attending: INTERNAL MEDICINE
Payer: COMMERCIAL

## 2019-03-01 DIAGNOSIS — Z94.2 LUNG REPLACED BY TRANSPLANT: ICD-10-CM

## 2019-03-01 DIAGNOSIS — E83.42 HYPOMAGNESEMIA: ICD-10-CM

## 2019-03-01 DIAGNOSIS — E83.39 HYPOPHOSPHATEMIA: ICD-10-CM

## 2019-03-01 LAB
ANION GAP SERPL CALC-SCNC: 12 MMOL/L
BUN SERPL-MCNC: 13 MG/DL
CALCIUM SERPL-MCNC: 10.5 MG/DL
CHLORIDE SERPL-SCNC: 95 MMOL/L
CO2 SERPL-SCNC: 31 MMOL/L
CREAT SERPL-MCNC: 1.6 MG/DL
EST. GFR  (AFRICAN AMERICAN): 54.9 ML/MIN/1.73 M^2
EST. GFR  (NON AFRICAN AMERICAN): 47.5 ML/MIN/1.73 M^2
GLUCOSE SERPL-MCNC: 175 MG/DL
MAGNESIUM SERPL-MCNC: 1.1 MG/DL
PHOSPHATE SERPL-MCNC: 3.9 MG/DL
POTASSIUM SERPL-SCNC: 2.9 MMOL/L
SODIUM SERPL-SCNC: 138 MMOL/L

## 2019-03-01 PROCEDURE — 36415 COLL VENOUS BLD VENIPUNCTURE: CPT | Mod: PO

## 2019-03-01 PROCEDURE — 83735 ASSAY OF MAGNESIUM: CPT

## 2019-03-01 PROCEDURE — 80048 BASIC METABOLIC PNL TOTAL CA: CPT

## 2019-03-01 PROCEDURE — 84100 ASSAY OF PHOSPHORUS: CPT

## 2019-03-01 NOTE — TELEPHONE ENCOUNTER
Reviewed BMP, magnesium and phosphorus lab results with Dr. Bill.  Received orders from her for magnesium sulfate 4 grams IV x 1 dose and potassium chloride 20 mEq oral x 1 dose.  Verified with Dr. Bill that the 20 mEq dose of potassium chloride should be in addition to the 10 mEq prescribed daily.  Also received an order from Dr. Bill to reschedule lab work from Tuesday, 3/5 to Monday, 3/4.  Contacted Linda, a pharmacist with [facility], who stated the ordered dose of magnesium sulfate can be mixed in one of the 500 cc Normal Saline (NS) infusions patient self-administers over 2 hours before each IV Foscarnet dose.  She reported that the patient tolerated the initial infusion of magnesium sulfate 4 grams given at the [facility] earlier this week without difficulty; thus, she feels home administration of subsequent IV magnesium sulfate is safe for patient.  Linda stated she can overnight mail a 500 cc NS bag with 4 grams of magnesium sulfate added to it so that patient can administer the fluid/electrolyte mixture tomorrow.    Notified Dr. Bill of the plan for 4 grams of IV magnesium sulfate in 500 cc NS recommended by the [facility] pharmacist with the fluid/electrolyte mixture to be given tomorrow, 3/2.  Dr. Bill gave her approval for this medication plan.  Notified Linda that Dr. Bill approved her plan for mixing the 4 grams of IV magnesium sulfate with one of the 500 cc NS infusions patient will self-administer tomorrow.  Linda verbalized her understanding and stated she will ship the fluid/electrolyte mixture to patient so he has if for tomorrow.    Called patient to discuss the plan of care as follows:  1. Explained that a bag of 500 cc NS mixed with 4 grams of IV magnesium sulfate will be shipped overnight to his house.  Instructed him to infuse this bag of fluids before his evening dose of Foscarnet in place of the plain NS he usually uses.  Verified with  patient that the pump he uses to infuse the 500 cc of NS is set at 250 cc/hour to ensure a 2-hour infusion rate.    2.  Take an extra potassium chloride 20 mEq by mouth x 1 dose this evening.  Patient is already prescribed 10 mEq by mouth daily.  Explained that patient should take 2 extra capsules (20 mEq) tonight then resume one capsule (10 mEq) daily tomorrow.  3.  Change lab appointment to Monday, 3/4/19 instead of Tuesday, 3/5/19.  Patient asked that the location for this lab appointment be moved to [facility] instead of [facility].  The patient repeated all instructions back to me correctly.  He asked questions which were answered to his satisfaction and verbalized his understanding of all information discussed.

## 2019-03-01 NOTE — TELEPHONE ENCOUNTER
----- Message from Caroline Sorto sent at 3/1/2019  3:20 PM CST -----  Contact: Linda kurt[facility]/ [facility]    Reason for call: Calling to speak with Sera regarding the pt    Would like to know if pt need to start another dose of magnesium         Communication Preference: [phone]    Additional Information: N/A

## 2019-03-01 NOTE — TELEPHONE ENCOUNTER
Returned call to Linda at [facility], who asked if patient's lab results were available and if patient needs additional IV magnesium infusions. Notified Linda  that labs are still being processed so the results are not available.  Linda stated that the [facility] office is open tomorrow and can provide IV magnesium therapy if needed.  She asked that we contact the on-call pharmacist, who can assist with arranging IV therapy, if needed.  The phone number to call for weekend infusion services is [phone].

## 2019-03-01 NOTE — TELEPHONE ENCOUNTER
----- Message from Juana Huertas sent at 3/1/2019 10:39 AM CST -----  Contact: Linda- [facility]- [phone]  Linda called stating she is still waiting on magnesium lab order- please contact her at [phone]

## 2019-03-01 NOTE — TELEPHONE ENCOUNTER
Returned call to Linda, pharmacist with [facility] who is checking to see if the patient will need an additional dose of IV magnesium today. Mg+ level still pending - informed that she would be contacted with plan once results have been received and reviewed by Dr. Bill. Verbalized her understanding.

## 2019-03-03 ENCOUNTER — HOSPITAL ENCOUNTER (INPATIENT)
Facility: HOSPITAL | Age: 59
LOS: 11 days | Discharge: HOME | DRG: 580 | End: 2019-03-14
Attending: EMERGENCY MEDICINE | Admitting: INTERNAL MEDICINE
Payer: COMMERCIAL

## 2019-03-03 ENCOUNTER — TELEPHONE (OUTPATIENT)
Dept: TRANSPLANT | Facility: HOSPITAL | Age: 59
End: 2019-03-03

## 2019-03-03 DIAGNOSIS — Z79.4 TYPE 2 DIABETES MELLITUS WITH HYPERGLYCEMIA, WITH LONG-TERM CURRENT USE OF INSULIN: Chronic | ICD-10-CM

## 2019-03-03 DIAGNOSIS — N17.9 AKI (ACUTE KIDNEY INJURY): ICD-10-CM

## 2019-03-03 DIAGNOSIS — R05.9 COUGH: ICD-10-CM

## 2019-03-03 DIAGNOSIS — B25.8 OTHER CYTOMEGALOVIRAL DISEASES: ICD-10-CM

## 2019-03-03 DIAGNOSIS — Z00.6 RESEARCH STUDY PATIENT: ICD-10-CM

## 2019-03-03 DIAGNOSIS — Z94.2 LUNG REPLACED BY TRANSPLANT: ICD-10-CM

## 2019-03-03 DIAGNOSIS — E83.42 HYPOMAGNESEMIA: ICD-10-CM

## 2019-03-03 DIAGNOSIS — Z79.2 PROPHYLACTIC ANTIBIOTIC: ICD-10-CM

## 2019-03-03 DIAGNOSIS — L03.113 CELLULITIS OF RIGHT UPPER EXTREMITY: Primary | ICD-10-CM

## 2019-03-03 DIAGNOSIS — E11.65 TYPE 2 DIABETES MELLITUS WITH HYPERGLYCEMIA, WITH LONG-TERM CURRENT USE OF INSULIN: Chronic | ICD-10-CM

## 2019-03-03 DIAGNOSIS — B25.9 CYTOMEGALOVIRUS (CMV) VIREMIA: ICD-10-CM

## 2019-03-03 DIAGNOSIS — E66.09 CLASS 1 OBESITY DUE TO EXCESS CALORIES WITH SERIOUS COMORBIDITY AND BODY MASS INDEX (BMI) OF 30.0 TO 30.9 IN ADULT: ICD-10-CM

## 2019-03-03 DIAGNOSIS — I82.621 ACUTE DEEP VEIN THROMBOSIS (DVT) OF RIGHT UPPER EXTREMITY, UNSPECIFIED VEIN: ICD-10-CM

## 2019-03-03 DIAGNOSIS — A41.9 SEPSIS, DUE TO UNSPECIFIED ORGANISM: ICD-10-CM

## 2019-03-03 DIAGNOSIS — T80.219A INFECTION OF PERIPHERALLY INSERTED CENTRAL VENOUS CATHETER (PICC), INITIAL ENCOUNTER: ICD-10-CM

## 2019-03-03 DIAGNOSIS — N17.9 ACUTE KIDNEY INJURY: ICD-10-CM

## 2019-03-03 DIAGNOSIS — D84.9 IMMUNOSUPPRESSION: ICD-10-CM

## 2019-03-03 LAB
ALBUMIN SERPL BCP-MCNC: 3 G/DL
ALP SERPL-CCNC: 58 U/L
ALT SERPL W/O P-5'-P-CCNC: 39 U/L
ANION GAP SERPL CALC-SCNC: 16 MMOL/L
AST SERPL-CCNC: 78 U/L
BASOPHILS # BLD AUTO: 0.05 K/UL
BASOPHILS NFR BLD: 0.2 %
BILIRUB SERPL-MCNC: 1 MG/DL
BUN SERPL-MCNC: 28 MG/DL
CALCIUM SERPL-MCNC: 7.2 MG/DL
CHLORIDE SERPL-SCNC: 91 MMOL/L
CO2 SERPL-SCNC: 28 MMOL/L
CREAT SERPL-MCNC: 2.8 MG/DL
CTP QC/QA: YES
DIFFERENTIAL METHOD: ABNORMAL
DOHLE BOD BLD QL SMEAR: PRESENT
EOSINOPHIL # BLD AUTO: 0 K/UL
EOSINOPHIL NFR BLD: 0.1 %
ERYTHROCYTE [DISTWIDTH] IN BLOOD BY AUTOMATED COUNT: 14.6 %
EST. GFR  (AFRICAN AMERICAN): 27.9 ML/MIN/1.73 M^2
EST. GFR  (NON AFRICAN AMERICAN): 24.1 ML/MIN/1.73 M^2
GLUCOSE SERPL-MCNC: 183 MG/DL
HCT VFR BLD AUTO: 28.9 %
HGB BLD-MCNC: 9.8 G/DL
IMM GRANULOCYTES # BLD AUTO: 0.48 K/UL
IMM GRANULOCYTES NFR BLD AUTO: 2.3 %
LACTATE SERPL-SCNC: 1.6 MMOL/L
LACTATE SERPL-SCNC: 2.2 MMOL/L
LYMPHOCYTES # BLD AUTO: 0.5 K/UL
LYMPHOCYTES NFR BLD: 2.3 %
MAGNESIUM SERPL-MCNC: 1.5 MG/DL
MCH RBC QN AUTO: 30.6 PG
MCHC RBC AUTO-ENTMCNC: 33.9 G/DL
MCV RBC AUTO: 90 FL
MONOCYTES # BLD AUTO: 0.4 K/UL
MONOCYTES NFR BLD: 2 %
NEUTROPHILS # BLD AUTO: 19.6 K/UL
NEUTROPHILS NFR BLD: 93.1 %
NRBC BLD-RTO: 0 /100 WBC
OVALOCYTES BLD QL SMEAR: ABNORMAL
PLATELET # BLD AUTO: 333 K/UL
PLATELET BLD QL SMEAR: ABNORMAL
PMV BLD AUTO: 9 FL
POC MOLECULAR INFLUENZA A AGN: NEGATIVE
POC MOLECULAR INFLUENZA B AGN: NEGATIVE
POCT GLUCOSE: 164 MG/DL (ref 70–110)
POLYCHROMASIA BLD QL SMEAR: ABNORMAL
POTASSIUM SERPL-SCNC: 2.5 MMOL/L
PROCALCITONIN SERPL IA-MCNC: 11.39 NG/ML
PROT SERPL-MCNC: 6.8 G/DL
RBC # BLD AUTO: 3.2 M/UL
SODIUM SERPL-SCNC: 135 MMOL/L
TOXIC GRANULES BLD QL SMEAR: PRESENT
WBC # BLD AUTO: 21.08 K/UL

## 2019-03-03 PROCEDURE — 83605 ASSAY OF LACTIC ACID: CPT

## 2019-03-03 PROCEDURE — 84484 ASSAY OF TROPONIN QUANT: CPT

## 2019-03-03 PROCEDURE — 85025 COMPLETE CBC W/AUTO DIFF WBC: CPT

## 2019-03-03 PROCEDURE — 83735 ASSAY OF MAGNESIUM: CPT

## 2019-03-03 PROCEDURE — 80053 COMPREHEN METABOLIC PANEL: CPT

## 2019-03-03 PROCEDURE — 20600001 HC STEP DOWN PRIVATE ROOM

## 2019-03-03 PROCEDURE — 63600175 PHARM REV CODE 636 W HCPCS: Performed by: HOSPITALIST

## 2019-03-03 PROCEDURE — 87804 INFLUENZA ASSAY W/OPTIC: CPT | Mod: 59

## 2019-03-03 PROCEDURE — 87070 CULTURE OTHR SPECIMN AEROBIC: CPT

## 2019-03-03 PROCEDURE — 96360 HYDRATION IV INFUSION INIT: CPT

## 2019-03-03 PROCEDURE — 87040 BLOOD CULTURE FOR BACTERIA: CPT

## 2019-03-03 PROCEDURE — 87449 NOS EACH ORGANISM AG IA: CPT

## 2019-03-03 PROCEDURE — S5571 INSULIN DISPOS PEN 3 ML: HCPCS | Performed by: HOSPITALIST

## 2019-03-03 PROCEDURE — 63600175 PHARM REV CODE 636 W HCPCS: Performed by: PHYSICIAN ASSISTANT

## 2019-03-03 PROCEDURE — 25000003 PHARM REV CODE 250: Performed by: PHYSICIAN ASSISTANT

## 2019-03-03 PROCEDURE — 99285 PR EMERGENCY DEPT VISIT,LEVEL V: ICD-10-PCS | Mod: ,,, | Performed by: EMERGENCY MEDICINE

## 2019-03-03 PROCEDURE — 99285 EMERGENCY DEPT VISIT HI MDM: CPT | Mod: ,,, | Performed by: EMERGENCY MEDICINE

## 2019-03-03 PROCEDURE — 84145 PROCALCITONIN (PCT): CPT

## 2019-03-03 PROCEDURE — 99285 EMERGENCY DEPT VISIT HI MDM: CPT | Mod: 25

## 2019-03-03 PROCEDURE — 25000003 PHARM REV CODE 250: Performed by: EMERGENCY MEDICINE

## 2019-03-03 PROCEDURE — 25000003 PHARM REV CODE 250: Performed by: STUDENT IN AN ORGANIZED HEALTH CARE EDUCATION/TRAINING PROGRAM

## 2019-03-03 RX ORDER — INSULIN ASPART 100 [IU]/ML
0-5 INJECTION, SOLUTION INTRAVENOUS; SUBCUTANEOUS
Status: DISCONTINUED | OUTPATIENT
Start: 2019-03-03 | End: 2019-03-14 | Stop reason: HOSPADM

## 2019-03-03 RX ORDER — INSULIN ASPART 100 [IU]/ML
12 INJECTION, SOLUTION INTRAVENOUS; SUBCUTANEOUS
Status: DISCONTINUED | OUTPATIENT
Start: 2019-03-04 | End: 2019-03-04

## 2019-03-03 RX ORDER — POTASSIUM CHLORIDE 20 MEQ/15ML
40 SOLUTION ORAL
Status: DISCONTINUED | OUTPATIENT
Start: 2019-03-03 | End: 2019-03-14 | Stop reason: HOSPADM

## 2019-03-03 RX ORDER — GLUCAGON 1 MG
1 KIT INJECTION
Status: DISCONTINUED | OUTPATIENT
Start: 2019-03-03 | End: 2019-03-14 | Stop reason: HOSPADM

## 2019-03-03 RX ORDER — ACETAMINOPHEN 500 MG
1000 TABLET ORAL EVERY 6 HOURS PRN
Status: DISCONTINUED | OUTPATIENT
Start: 2019-03-03 | End: 2019-03-03

## 2019-03-03 RX ORDER — MAGNESIUM SULFATE HEPTAHYDRATE 40 MG/ML
2 INJECTION, SOLUTION INTRAVENOUS
Status: DISCONTINUED | OUTPATIENT
Start: 2019-03-03 | End: 2019-03-14 | Stop reason: HOSPADM

## 2019-03-03 RX ORDER — ACETAMINOPHEN 325 MG/1
650 TABLET ORAL EVERY 6 HOURS PRN
Status: DISCONTINUED | OUTPATIENT
Start: 2019-03-03 | End: 2019-03-14 | Stop reason: HOSPADM

## 2019-03-03 RX ORDER — VANCOMYCIN HYDROCHLORIDE
1250
Status: DISCONTINUED | OUTPATIENT
Start: 2019-03-03 | End: 2019-03-03

## 2019-03-03 RX ORDER — PREDNISONE 5 MG/1
5 TABLET ORAL DAILY
Status: DISCONTINUED | OUTPATIENT
Start: 2019-03-04 | End: 2019-03-14 | Stop reason: HOSPADM

## 2019-03-03 RX ORDER — VANCOMYCIN HYDROCHLORIDE
1250
Status: DISCONTINUED | OUTPATIENT
Start: 2019-03-03 | End: 2019-03-03 | Stop reason: ALTCHOICE

## 2019-03-03 RX ORDER — IBUPROFEN 200 MG
16 TABLET ORAL
Status: DISCONTINUED | OUTPATIENT
Start: 2019-03-03 | End: 2019-03-14 | Stop reason: HOSPADM

## 2019-03-03 RX ORDER — POTASSIUM CHLORIDE 20 MEQ/15ML
60 SOLUTION ORAL
Status: DISCONTINUED | OUTPATIENT
Start: 2019-03-03 | End: 2019-03-14 | Stop reason: HOSPADM

## 2019-03-03 RX ORDER — ONDANSETRON 2 MG/ML
4 INJECTION INTRAMUSCULAR; INTRAVENOUS EVERY 6 HOURS PRN
Status: DISCONTINUED | OUTPATIENT
Start: 2019-03-03 | End: 2019-03-14 | Stop reason: HOSPADM

## 2019-03-03 RX ORDER — LANOLIN ALCOHOL/MO/W.PET/CERES
800 CREAM (GRAM) TOPICAL 3 TIMES DAILY
Status: DISCONTINUED | OUTPATIENT
Start: 2019-03-03 | End: 2019-03-04

## 2019-03-03 RX ORDER — CEFEPIME HYDROCHLORIDE 1 G/1
1 INJECTION, POWDER, FOR SOLUTION INTRAMUSCULAR; INTRAVENOUS
Status: DISCONTINUED | OUTPATIENT
Start: 2019-03-03 | End: 2019-03-06

## 2019-03-03 RX ORDER — VANCOMYCIN HYDROCHLORIDE
15
Status: DISCONTINUED | OUTPATIENT
Start: 2019-03-03 | End: 2019-03-03

## 2019-03-03 RX ORDER — POTASSIUM CHLORIDE 20 MEQ/1
20 TABLET, EXTENDED RELEASE ORAL DAILY
Status: DISCONTINUED | OUTPATIENT
Start: 2019-03-04 | End: 2019-03-14 | Stop reason: HOSPADM

## 2019-03-03 RX ORDER — FOLIC ACID 1 MG/1
1000 TABLET ORAL DAILY
Status: DISCONTINUED | OUTPATIENT
Start: 2019-03-04 | End: 2019-03-14 | Stop reason: HOSPADM

## 2019-03-03 RX ORDER — SODIUM CHLORIDE 9 MG/ML
INJECTION, SOLUTION INTRAVENOUS CONTINUOUS
Status: DISCONTINUED | OUTPATIENT
Start: 2019-03-03 | End: 2019-03-04

## 2019-03-03 RX ORDER — FAMOTIDINE 20 MG/1
20 TABLET, FILM COATED ORAL 2 TIMES DAILY
Status: DISCONTINUED | OUTPATIENT
Start: 2019-03-03 | End: 2019-03-14 | Stop reason: HOSPADM

## 2019-03-03 RX ORDER — IBUPROFEN 200 MG
24 TABLET ORAL
Status: DISCONTINUED | OUTPATIENT
Start: 2019-03-03 | End: 2019-03-14 | Stop reason: HOSPADM

## 2019-03-03 RX ORDER — FLUTICASONE PROPIONATE 50 MCG
1 SPRAY, SUSPENSION (ML) NASAL DAILY
Status: DISCONTINUED | OUTPATIENT
Start: 2019-03-04 | End: 2019-03-14 | Stop reason: HOSPADM

## 2019-03-03 RX ORDER — POTASSIUM CHLORIDE 20 MEQ/1
40 TABLET, EXTENDED RELEASE ORAL
Status: COMPLETED | OUTPATIENT
Start: 2019-03-03 | End: 2019-03-03

## 2019-03-03 RX ORDER — PRAVASTATIN SODIUM 20 MG/1
20 TABLET ORAL DAILY
Status: DISCONTINUED | OUTPATIENT
Start: 2019-03-04 | End: 2019-03-14 | Stop reason: HOSPADM

## 2019-03-03 RX ORDER — ENOXAPARIN SODIUM 100 MG/ML
40 INJECTION SUBCUTANEOUS EVERY 24 HOURS
Status: DISCONTINUED | OUTPATIENT
Start: 2019-03-03 | End: 2019-03-04

## 2019-03-03 RX ORDER — ASPIRIN 81 MG/1
81 TABLET ORAL DAILY
Status: DISCONTINUED | OUTPATIENT
Start: 2019-03-04 | End: 2019-03-14 | Stop reason: HOSPADM

## 2019-03-03 RX ORDER — SODIUM CHLORIDE 9 MG/ML
500 INJECTION, SOLUTION INTRAVENOUS
Status: COMPLETED | OUTPATIENT
Start: 2019-03-03 | End: 2019-03-03

## 2019-03-03 RX ADMIN — SODIUM CHLORIDE 1000 ML: 0.9 INJECTION, SOLUTION INTRAVENOUS at 03:03

## 2019-03-03 RX ADMIN — SODIUM CHLORIDE 2994 ML: 0.9 INJECTION, SOLUTION INTRAVENOUS at 03:03

## 2019-03-03 RX ADMIN — Medication 1250 MG: at 06:03

## 2019-03-03 RX ADMIN — MAGNESIUM OXIDE TAB 400 MG (241.3 MG ELEMENTAL MG) 800 MG: 400 (241.3 MG) TAB at 09:03

## 2019-03-03 RX ADMIN — SODIUM CHLORIDE 500 ML: 0.9 INJECTION, SOLUTION INTRAVENOUS at 02:03

## 2019-03-03 RX ADMIN — MAGNESIUM SULFATE IN WATER 2 G: 40 INJECTION, SOLUTION INTRAVENOUS at 09:03

## 2019-03-03 RX ADMIN — FAMOTIDINE 20 MG: 20 TABLET ORAL at 09:03

## 2019-03-03 RX ADMIN — CEFEPIME 1 G: 1 INJECTION, POWDER, FOR SOLUTION INTRAMUSCULAR; INTRAVENOUS at 06:03

## 2019-03-03 RX ADMIN — INSULIN DETEMIR 20 UNITS: 100 INJECTION, SOLUTION SUBCUTANEOUS at 11:03

## 2019-03-03 RX ADMIN — INSULIN ASPART 0 UNITS: 100 INJECTION, SOLUTION INTRAVENOUS; SUBCUTANEOUS at 11:03

## 2019-03-03 RX ADMIN — POTASSIUM CHLORIDE 40 MEQ: 1500 TABLET, EXTENDED RELEASE ORAL at 02:03

## 2019-03-03 RX ADMIN — ENOXAPARIN SODIUM 40 MG: 100 INJECTION SUBCUTANEOUS at 06:03

## 2019-03-03 RX ADMIN — SODIUM CHLORIDE: 0.9 INJECTION, SOLUTION INTRAVENOUS at 06:03

## 2019-03-03 NOTE — TELEPHONE ENCOUNTER
Received telephone call from patient via transplant center line. Patient reports that his right arm is swollen inferior from the PICC site. Patient denies any tenderness, warmth, or redness on the right arm. Patient reports that he has overall felt bad over the past few days and notes a few episodes of emesis with multiple episodes of diarrhea over the past 2-3 days. Patient endorses more fatigue as well. Patient denies any fevers or chills. Patient reports last dose of Foscarnet was last night and his next dose is due this morning. Instructed patient to hold on his morning dose of Foscarnet. Instructed patient to present to the ED as he will likely need IVF and electrolyte replacement along with possible drug holiday from Foscarnet. Instructed patient to bring personal items in anticipation for possible admission. Patient verbalized understanding and agrees with current plan. Plan of care was discussed with Dr. Wolfe.

## 2019-03-03 NOTE — MEDICAL/APP STUDENT
Hospital Medicine  History and Physical Exam    Team: [facility] HOSP MED D Arturo Payton MD  Admit Date: 3/3/2019  DERICK   Principal Problem:  <principal problem not specified>   Patient information was obtained from patient and ER records.   Primary care Physician: Sukhi Dunham Jr, MD  Code status: Full Code    HPI:   59 yo male with a history of a sarcoidosis (complicated by pulmonary hypertension and ESLD, s/p bilateral orthotopic lung transplant 8/2017), on maintenance tacro/MMF/prednisone, left vocal cord dysfunction, HTN and DM2 who presents with diarrhea. Associated with malaise, nausea, and vomiting which started 2 days ago. Blood in vomitus or diarrhea. Started feeling unwell 1 week ago. Recently admitted for IV foscarnet induction which was well tolerated (2/12-2/15). Had PICC placed at that admission, and now complains of swelling around PICC site.      Past Medical History: Patient has a past medical history of AVILA (acute kidney injury) (8/27/2017), Atrial fibrillation (8/23/2017), On home oxygen therapy, KRISTYN on CPAP, Osteopenia, Pancreatitis (2009), Pulmonary hypertension, Pure hypercholesterolemia (11/15/2017), Sarcoidosis, Shortness of breath, and Type 2 diabetes mellitus (11/5/2017).    Past Surgical History: Patient has a past surgical history that includes Abdominal surgery; Cholecystectomy; Lung biopsy; Chest tube insertion; Bronchoscopy; Lung transplant (08/2017); Colonoscopy; Esophagogastroduodenoscopy (N/A, 8/6/2018); Bronchoscopy (N/A, 8/22/2018); and Bronchoscopy (N/A, 11/20/2018).    Social History: Patient reports that  has never smoked. he has never used smokeless tobacco. He reports that he does not drink alcohol or use drugs.    Family History: family history includes Diabetes in his brother and father; Hypertension in his mother; Kidney disease in his sister.    Medications:   Prior to Admission medications    Medication Sig Start Date End Date Taking? Authorizing Provider   amLODIPine  (NORVASC) 5 MG tablet Take 1 tablet (5 mg total) by mouth once daily. 1/3/19  Yes Samantha Bill,    CALCIUM 600 + D,3, 600 mg(1,500mg) -200 unit Tab TAKE 1 TABLET BY MOUTH TWICE DAILY 2/4/19  Yes Darrick Wolfe MD   ECOTRIN LOW STRENGTH 81 mg EC tablet TAKE 1 TABLET DAILY 11/13/18  Yes Darrick Wolfe MD   famotidine (PEPCID) 20 MG tablet TAKE 1 TABLET TWICE A DAY 11/19/18  Yes Darrick Wolfe MD   fluticasone (FLONASE) 50 mcg/actuation nasal spray 1 spray by Each Nare route once daily. 10/26/17  Yes Darrick Wolfe MD   folic acid (FOLVITE) 1 MG tablet TAKE 1 TABLET DAILY 11/13/18  Yes Darrick Wolfe MD   foscarnet in dextrose 5 % infusion Inject 750 mLs (9,000 mg total) into the vein every 12 (twelve) hours. 2/14/19  Yes Darrick Wolfe MD   insulin degludec (TRESIBA FLEXTOUCH U-200) 200 unit/mL (3 mL) InPn Inject 20 Units into the skin every evening. 10/4/18  Yes Charlotte Prieto NP   insulin lispro (HUMALOG KWIKPEN INSULIN) 100 unit/mL InPn pen Inject 14 Units into the skin 3 (three) times daily before meals. + correction scale TDD: 60 units 10/4/18  Yes Charlotte Prieto NP   magnesium oxide (MAG-OX) 400 mg (241.3 mg magnesium) tablet Take 2 tablets (800 mg total) by mouth 3 (three) times daily. 2/20/19  Yes Darrick Wolfe MD   metFORMIN (GLUCOPHAGE-XR) 500 MG 24 hr tablet TAKE 2 TABLETS BY MOUTH TWICE DAILY WITH MEALS 12/18/18  Yes CIELO Ellis, FNP   multivitamin (THERAGRAN) per tablet Take 1 tablet by mouth once daily.   Yes Brea Weldon MD   potassium chloride (KLOR-CON) 10 MEQ TbSR Take 1 tablet (10 mEq total) by mouth once daily. 2/28/19  Yes Darrick Wolfe MD   pravastatin (PRAVACHOL) 20 MG tablet TAKE 1 TABLET BY MOUTH ONCE DAILY 11/12/18  Yes Charlotte Prieto NP   predniSONE (DELTASONE) 5 MG tablet Take 1 tablet (5 mg total) by mouth once daily. 2/14/19  Yes Darrick Wolfe MD   sulfamethoxazole-trimethoprim 800-160mg (BACTRIM DS) 800-160 mg Tab  "Take 1 tablet by mouth every Mon, Wed, Fri. 11/9/18  Yes Darrick Wolfe MD   tacrolimus (PROGRAF) 0.5 MG Cap Take 3 capsules (1.5 mg total) by mouth every 12 (twelve) hours. 12/13/18  Yes Darrick Wolfe MD   furosemide (LASIX) 40 MG tablet Take 1 tablet (40 mg total) by mouth daily as needed. 3/26/18 3/26/19  Darrick Wolfe MD   lancets Misc To check BG 4 times daily, to use with insurance preferred meter 7/2/18   Charlotte Prieto NP   ONETOUCH VERIO Strp USE TO CHECK BLOOD GLUCOSE FOUR TIMES A DAY, TO USE WITH INSURANCE PREFERRED METER 11/15/18   Charlotte Prieto NP   pen needle, diabetic (BD ULTRA-FINE JIM PEN NEEDLES) 32 gauge x 5/32" Ndle Uses 4 times daily, on multiple daily insulin injections 11/15/17   Ce Bertrand NP   potassium chloride SA (K-DUR,KLOR-CON) 20 MEQ tablet Take 1 tablet (20 mEq total) by mouth once daily. 2/26/19   Darrick Wolfe MD   ACCU-CHEK DEANNE PLUS METER Misc USE AS DIRECTED 4/19/17 11/15/17  Historical Provider, MD       Allergies: Patient has No Known Allergies.    Review of Systems   Constitutional: Negative for chills, fever and malaise/fatigue.   Respiratory: Negative for shortness of breath.    Cardiovascular: Negative for chest pain and palpitations.   Gastrointestinal: Negative for abdominal pain, nausea and vomiting.   Neurological: Negative for weakness.   Psychiatric/Behavioral: Negative for depression. The patient is not nervous/anxious.        PEx  Temp:  [98.2 °F (36.8 °C)]   Pulse:  [99]   Resp:  [18]   BP: (114)/(66)   SpO2:  [95 %]   Body mass index is 30.68 kg/m².     Physical Exam   Constitutional: He is oriented to person, place, and time and well-developed, well-nourished, and in no distress. No distress.   Eyes: Pupils are equal, round, and reactive to light.   Cardiovascular: Normal rate and regular rhythm.   Pulmonary/Chest: Effort normal and breath sounds normal. No respiratory distress. He has no wheezes. He has no rales.   Abdominal: Soft. " He exhibits no distension. There is no tenderness.   Musculoskeletal: He exhibits no edema.   Neurological: He is alert and oriented to person, place, and time.       No intake or output data in the 24 hours ending 03/03/19 1444  Recent Labs   Lab 02/26/19  0836 03/03/19  1353   WBC 7.44 21.08*   HGB 11.1* 9.8*   HCT 34.2* 28.9*    333     Recent Labs   Lab 02/26/19  0836 03/01/19  1028    138   K 2.9* 2.9*   CL 95 95   CO2 34* 31*   BUN 14 13   CREATININE 1.4 1.6*   * 175*   CALCIUM 11.8* 10.5   MG 0.7* 1.1*   PHOS 3.1 3.9     No results for input(s): ALKPHOS, ALT, AST, ALBUMIN, PROT, BILITOT, INR in the last 168 hours.   No results for input(s): POCTGLUCOSE in the last 168 hours.  No results for input(s): CPK, CPKMB, MB, TROPONINI in the last 72 hours.    Recent Labs     03/03/19  1353   LACTATE 1.6        There are no hospital problems to display for this patient.      Overview      Assessment and Plan for Problems addressed today:    Hypokalemia  · Started foscarnet in 2/2019    Leukocytosis  · Blood cultures pending (collected 3/3)  · procalcitonin pending  · CMV quant pending    Acute kidney injury  · Baseline sCr 0.8, 2.8 at admit  · Foscarnet toxicity vs septic ATN    Lung replaced by transplant  Immmunosuppression  · Bilateral transplant secondary to sarcodosis, complicated by pulmonary hypertension, transplant history also complicated by left vocal cord dysfunction as well as 2 episodes of A2 rejection on 10/2017 (s/p steroids) and 3/2018 (s/p 3 doses of thymoglobulin)  · Home meds include ppx bactrim   · Daily tacrolimus levels  · Lung transplant consulted for PICC line (displaced on CXR)    Diabetes mellitus type 2  · HgbA1c 10.9 (2/12)  · Endocrinology consulted    Anemia  ·     Transaminitis  ·     Pure hypercholesteremia  · Home meds include pravastatin    Essential hypertension  · Home meds include amlodipine    Diet:  No diet orders on file  GI PPx:   DVT PPx:   VTE Risk  Mitigation (From admission, onward)    None        Goals of care:   Lines/Drains/Airways:   Wounds:   Discharge plan and follow up:     Provider   Emma Griggs MD  Staff Hospitalist   Spectra [id number]    I personally scribed for Emma Griggs MD on 03/03/2019 at 2:44 PM. Electronically signed by senait Ingram on 03/03/2019 at 2:44 PM

## 2019-03-03 NOTE — ASSESSMENT & PLAN NOTE
Current outpatient regimen: tacrolimus 1.5 mg BID and prednisone 5 mg daily.       Will hold tacrolimus in setting of AVILA. Will monitor daily tacrolimus levels while inpatient and monitor for supertherapeutic levels given current AVILA. Continue prednisone 5 mg daily.

## 2019-03-03 NOTE — HPI
Mr. Martin Hendrix is a 57 YO male s/p BLT 18 months ago secondary to sarcoidosis with associated pulmonary hypertension. 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 Last Foscarnet infusion on 3/2 PM dose.     Patient presents to the ED today following order from lung transplant service as patient notified LUT team of swelling of the right arm where the PICC was located and symptoms of nausea, vomiting, and diarrhea over the past several days. Patient reports that two days ago he initially noted swelling of the right arm inferior to the PICC site that this later resolved. Patient reports that yesterday the swelling returned yesterday and continued to progress this morning, which prompted his call. Patient does report slight tenderness at the PICC site. Patient also notes having chills, body aches, and increasing feelings of malaise and fatigue over the past few days. Patient report 4-5 episodes of non-bloody emesis over the past two days but denies any episodes of vomiting today. Patient reports increasing episodes of diarrhea over the past week but is unable to ascertain if this is related to his increasing magnesium oxide supplementation that he was started on. Patient endorses poor appetite over the past few days as well. Patient does note a slightly increasing in frequency nature of his chronic, dry cough. Denies any dyspnea on exertion, chest pain. Denies any sinus congestion.     In the ED, 1.5L total of NS fluids were administered. CXR revealed mal-position of the PICC, and per communication with ED staff, the PICC site appeared infected, so the PICC was removed with the tip sent for cultures. Influenza testing negative. CBC remarkable for  elevated WBC of 25. CMP remarkable for several electrolyte imbalances as well as AVILA with Cr of 2.6. Patient admitted for IVF rehydration as well as IV antibiotics.

## 2019-03-03 NOTE — ASSESSMENT & PLAN NOTE
Secondary to Foscarnet infusions. Will continue Mag Ox 800 mg TID as previously ordered as outpatient. Mag replacement orders available as needed.

## 2019-03-03 NOTE — ASSESSMENT & PLAN NOTE
Started on Foscarnet IV infusions BID on 2/13/2019 and has been closely followed by ID, Dr. Lowry.     ID consulted, appreciate recs.     Will continue to hold Foscarnet infusions for now while awaiting resolution of AVILA and electrolyte imbalances.

## 2019-03-03 NOTE — ASSESSMENT & PLAN NOTE
Cr noted to increase from 1.6 on 3/1 to 2.8 today. Likely medication induced as patient is currently receiving Foscarnet infusions as well as nephrotoxic tacrolimus for immunosuppression.     1.5L IVF administered while in the ER. Will hold nephrotoxic medications tacrolimus and Bactrim while waiting for resolution for AVILA.     Will continue to hold Foscarnet infusions until Cr and electrolyte imbalances improve.

## 2019-03-03 NOTE — ED NOTES
LOC: The patient is awake and alert; oriented x 3 and speaking appropriately.  APPEARANCE: Patient resting comfortably, patient is clean and well groomed.  SKIN: warm and dry, normal skin turgor & moist mucus membranes, skin intact, no breakdown noted.  MUSCULOSKELETAL: Patient moving all extremities well, mild swelling noted to lower R arm, no redness, pt denies pain.  RESPIRATORY: Airway is open and patent, respirations are spontaneous, normal effort and rate.  CARDIAC: Patient has a normal rate, capillary refill < 3 seconds; No complaints of chest pain.  ABDOMEN: Soft and non tender to palpation, no distention noted; pt reports intermittent n/v/d since Friday.

## 2019-03-03 NOTE — SUBJECTIVE & OBJECTIVE
Past Medical History:   Diagnosis Date    AVILA (acute kidney injury) 8/27/2017    Atrial fibrillation 8/23/2017    On home oxygen therapy     KRISTYN on CPAP     Osteopenia     Pancreatitis 2009    hospitalized    Pulmonary hypertension     Pure hypercholesterolemia 11/15/2017    Sarcoidosis     Shortness of breath     Type 2 diabetes mellitus 11/5/2017       Past Surgical History:   Procedure Laterality Date    ABDOMINAL SURGERY      6 weeks old    BRONCHOSCOPY      CHEST TUBE INSERTION      CHOLECYSTECTOMY      COLONOSCOPY      EGD (ESOPHAGOGASTRODUODENOSCOPY) N/A 8/6/2018    Performed by Ramu Eisenberg MD at [facility] ENDO (2ND FLR)    flexible bronchoscopy with tissue biopsy N/A 3/21/2018    Performed by [facility] Diagnostic Provider at [facility] OR 2ND FLR    flexible bronchoscopy with tissue biopsy CPT 50853 N/A 8/22/2018    Performed by [facility] Diagnostic Provider at [facility] OR 2ND FLR    flexible bronchoscopy with tissue biopsy CPT 79127 N/A 4/19/2018    Performed by [facility] Diagnostic Provider at [facility] OR 2ND FLR    flexible bronchoscopy with tissue biopsy CPT 60647 N/A 2/21/2018    Performed by [facility] Diagnostic Provider at [facility] OR 2ND FLR    flexible bronchoscopy with tissue biopsy CPT 62361 N/A 12/20/2017    Performed by [facility] Diagnostic Provider at [facility] OR 2ND FLR    flexible bronchoscopy with tissue biopsy CPT 11228 N/A 11/22/2017    Performed by [facility] Diagnostic Provider at [facility] OR 2ND FLR    flexible bronchoscopy with tissue biopsy CPT 32488 N/A 11/2/2017    Performed by [facility] Diagnostic Provider at [facility] OR 2ND FLR    flexible bronchoscopy with tissue biopsy CPT 18184 N/A 10/4/2017    Performed by [facility] Diagnostic Provider at [facility] OR 2ND FLR    flexible bronchoscopy with tissue biopy CPT 98409 N/A 11/20/2018    Performed by [facility] Diagnostic Provider at [facility] OR 2ND FLR    HEART CATH-RIGHT Right 5/22/2017    Performed by Shanell Higuera MD at [facility] CATH LAB    LUNG BIOPSY      LUNG TRANSPLANT  08/2017     PH MONITORING, ESOPHAGUS, WIRELESS, (OFF REFLUX MEDS) N/A 8/6/2018    Performed by Ramu Eisenberg MD at [facility] ENDO (2ND FLR)    TRANSPLANT-LUNG Bilateral 8/22/2017    Performed by Paulo German MD at [facility] OR 2ND FLR       Review of patient's allergies indicates:  No Known Allergies    PTA Medications   Medication Sig    amLODIPine (NORVASC) 5 MG tablet Take 1 tablet (5 mg total) by mouth once daily.    CALCIUM 600 + D,3, 600 mg(1,500mg) -200 unit Tab TAKE 1 TABLET BY MOUTH TWICE DAILY    ECOTRIN LOW STRENGTH 81 mg EC tablet TAKE 1 TABLET DAILY    famotidine (PEPCID) 20 MG tablet TAKE 1 TABLET TWICE A DAY    fluticasone (FLONASE) 50 mcg/actuation nasal spray 1 spray by Each Nare route once daily.    folic acid (FOLVITE) 1 MG tablet TAKE 1 TABLET DAILY    foscarnet in dextrose 5 % infusion Inject 750 mLs (9,000 mg total) into the vein every 12 (twelve) hours.    insulin degludec (TRESIBA FLEXTOUCH U-200) 200 unit/mL (3 mL) InPn Inject 20 Units into the skin every evening.    insulin lispro (HUMALOG KWIKPEN INSULIN) 100 unit/mL InPn pen Inject 14 Units into the skin 3 (three) times daily before meals. + correction scale TDD: 60 units    magnesium oxide (MAG-OX) 400 mg (241.3 mg magnesium) tablet Take 2 tablets (800 mg total) by mouth 3 (three) times daily.    metFORMIN (GLUCOPHAGE-XR) 500 MG 24 hr tablet TAKE 2 TABLETS BY MOUTH TWICE DAILY WITH MEALS    multivitamin (THERAGRAN) per tablet Take 1 tablet by mouth once daily.    potassium chloride (KLOR-CON) 10 MEQ TbSR Take 1 tablet (10 mEq total) by mouth once daily.    pravastatin (PRAVACHOL) 20 MG tablet TAKE 1 TABLET BY MOUTH ONCE DAILY    predniSONE (DELTASONE) 5 MG tablet Take 1 tablet (5 mg total) by mouth once daily.    sulfamethoxazole-trimethoprim 800-160mg (BACTRIM DS) 800-160 mg Tab Take 1 tablet by mouth every Mon, Wed, Fri.    tacrolimus (PROGRAF) 0.5 MG Cap Take 3 capsules (1.5 mg total) by mouth every 12 (twelve) hours.     "furosemide (LASIX) 40 MG tablet Take 1 tablet (40 mg total) by mouth daily as needed.    lancets Misc To check BG 4 times daily, to use with insurance preferred meter    ONETOUCH VERIO Strp USE TO CHECK BLOOD GLUCOSE FOUR TIMES A DAY, TO USE WITH INSURANCE PREFERRED METER    pen needle, diabetic (BD ULTRA-FINE JIM PEN NEEDLES) 32 gauge x 5/32" Ndle Uses 4 times daily, on multiple daily insulin injections    potassium chloride SA (K-DUR,KLOR-CON) 20 MEQ tablet Take 1 tablet (20 mEq total) by mouth once daily.     Family History     Problem Relation (Age of Onset)    Diabetes Father, Brother    Hypertension Mother    Kidney disease Sister        Tobacco Use    Smoking status: Never Smoker    Smokeless tobacco: Never Used   Substance and Sexual Activity    Alcohol use: No    Drug use: No    Sexual activity: Not on file     Review of Systems   Constitutional: Positive for appetite change, chills and fatigue. Negative for diaphoresis and fever.   HENT: Positive for voice change. Negative for congestion, rhinorrhea, sinus pain and sore throat.    Eyes: Negative for discharge and redness.   Respiratory: Positive for cough. Negative for shortness of breath and wheezing.    Cardiovascular: Positive for leg swelling. Negative for chest pain.   Gastrointestinal: Positive for diarrhea, nausea and vomiting. Negative for abdominal distention and abdominal pain.   Endocrine: Negative for polydipsia and polyuria.   Genitourinary: Negative for dysuria, frequency and urgency.   Musculoskeletal: Positive for myalgias. Negative for back pain, neck pain and neck stiffness.   Skin: Positive for color change (RUE). Negative for pallor.   Allergic/Immunologic: Positive for immunocompromised state.   Neurological: Negative for dizziness, light-headedness and headaches.   Hematological: Negative for adenopathy. Does not bruise/bleed easily.   Psychiatric/Behavioral: Negative for agitation, confusion and decreased concentration. "     Objective:     Vital Signs (Most Recent):  Temp: 98.2 °F (36.8 °C) (03/03/19 1237)  Pulse: 99 (03/03/19 1237)  Resp: 18 (03/03/19 1237)  BP: 114/66 (03/03/19 1237)  SpO2: 95 % (03/03/19 1237) Vital Signs (24h Range):  Temp:  [98.2 °F (36.8 °C)] 98.2 °F (36.8 °C)  Pulse:  [99] 99  Resp:  [18] 18  SpO2:  [95 %] 95 %  BP: (114)/(66) 114/66     Weight: 99.8 kg (220 lb)  Body mass index is 30.68 kg/m².    No intake or output data in the 24 hours ending 03/03/19 1731    Physical Exam   Constitutional: He is oriented to person, place, and time. He appears well-developed and well-nourished. No distress.   HENT:   Head: Normocephalic and atraumatic.   Right Ear: External ear normal.   Left Ear: External ear normal.   Nose: Nose normal.   Mouth/Throat: Oropharynx is clear and moist.   Eyes: Conjunctivae and EOM are normal. Right eye exhibits no discharge. Left eye exhibits no discharge.   Neck: Normal range of motion. Neck supple. No JVD present.   Cardiovascular: Normal rate, regular rhythm and normal heart sounds. Exam reveals no gallop and no friction rub.   No murmur heard.  Pulmonary/Chest: Effort normal. He has no wheezes. He has no rhonchi. He has no rales.   Well-healed mid-line sternotomy incision   Abdominal: Soft. Bowel sounds are normal. He exhibits no distension. There is no tenderness.   Musculoskeletal: Normal range of motion. He exhibits edema (RUE with associated erythema; BLE to ankle).   Neurological: He is alert and oriented to person, place, and time.   Skin: Skin is warm and dry. He is not diaphoretic. There is erythema.   RUE erythema localized to the right hand   Psychiatric: He has a normal mood and affect. His behavior is normal.   Nursing note and vitals reviewed.      Significant Labs:  CBC:  Recent Labs   Lab 03/03/19  1353   WBC 21.08*   RBC 3.20*   HGB 9.8*   HCT 28.9*      MCV 90   MCH 30.6   MCHC 33.9     BMP:  Recent Labs   Lab 03/03/19  1353   *   K 2.5*   CL 91*   CO2 28    BUN 28*   CREATININE 2.8*   CALCIUM 7.2*        I have reviewed all pertinent labs within the past 24 hours.    Diagnostic Results:  Labs: Reviewed  X-Ray: Reviewed

## 2019-03-03 NOTE — CARE UPDATE
Consulted for hyperglycemia and DM management  Chart reviewed    Will tentatively start  Levemir 20 U QHS  Novolog 12U TIDWM  Low dose correction scale  POCT glucose check ACHS    Full consult note to follow tomorrow    Baldemar Franco MD  Endocrine Fellow  3/3/2019 4:46 PM

## 2019-03-03 NOTE — ED PROVIDER NOTES
Encounter Date: 3/3/2019       History     Chief Complaint   Patient presents with    Arm Swelling     lung txp in 2017. picc line in right arm. right arm swelling. noticed yesterday.      Mr. Hendrix is a 58M with sarcoidosis s/p lung transplant 8/2017, IDDM2, pHTN 2/2 sarcoidosis presenting with general malaise, nausea, vomiting, and diarrhea. He has a PICC line in placed on 2/12 for treatment of CMV viremia with Foscarnet (8 week course). He started to feel bad about 1 week ago, which has progressively worsened in the past two days. He developed nausea, vomiting, and diarrhea 2 days ago - there is no blood in the vomitus or diarrhea. He has had at least five episodes of watery diarrhea per day for the past two days. No fevers, but developed chills yesterday night.  He has also noticed increased swelling in his RUE where his PICC line is. There is also tenderness around the site of the PICC.          Review of patient's allergies indicates:  No Known Allergies  Past Medical History:   Diagnosis Date    AVILA (acute kidney injury) 8/27/2017    Atrial fibrillation 8/23/2017    On home oxygen therapy     KRISTYN on CPAP     Osteopenia     Pancreatitis 2009    hospitalized    Pulmonary hypertension     Pure hypercholesterolemia 11/15/2017    Sarcoidosis     Shortness of breath     Type 2 diabetes mellitus 11/5/2017     Past Surgical History:   Procedure Laterality Date    ABDOMINAL SURGERY      6 weeks old    BRONCHOSCOPY      CHEST TUBE INSERTION      CHOLECYSTECTOMY      COLONOSCOPY      EGD (ESOPHAGOGASTRODUODENOSCOPY) N/A 8/6/2018    Performed by [facility] ([facility])    flexible bronchoscopy with tissue biopsy N/A 3/21/2018    Performed by [facility]    flexible bronchoscopy with tissue biopsy CPT 75115 N/A 8/22/2018    Performed by [facility]    flexible bronchoscopy with tissue biopsy CPT 02877 N/A 4/19/2018     Performed by [facility] Diagnostic Provider at [facility] OR 2ND FLR    flexible bronchoscopy with tissue biopsy CPT 71064 N/A 2/21/2018    Performed by [facility] Diagnostic Provider at [facility] OR 2ND FLR    flexible bronchoscopy with tissue biopsy CPT 30169 N/A 12/20/2017    Performed by [facility] Diagnostic Provider at [facility] OR 2ND FLR    flexible bronchoscopy with tissue biopsy CPT 27737 N/A 11/22/2017    Performed by [facility] Diagnostic Provider at [facility] OR 2ND FLR    flexible bronchoscopy with tissue biopsy CPT 54907 N/A 11/2/2017    Performed by [facility] Diagnostic Provider at [facility] OR 2ND FLR    flexible bronchoscopy with tissue biopsy CPT 19861 N/A 10/4/2017    Performed by [facility] Diagnostic Provider at [facility] 2ND FLR    flexible bronchoscopy with tissue biopy CPT 84154 N/A 11/20/2018    Performed by [facility] Diagnostic Provider at [facility] OR 2ND FLR    HEART CATH-RIGHT Right 5/22/2017    Performed by Shanell Higuera MD at [facility] CATH LAB    LUNG BIOPSY      LUNG TRANSPLANT  08/2017    PH MONITORING, ESOPHAGUS, WIRELESS, (OFF REFLUX MEDS) N/A 8/6/2018    Performed by Ramu Eisenberg MD at [facility] END[facility] (2ND FLR)    TRANSPLANT-LUNG Bilateral 8/22/2017    Performed by Paulo German MD at [facility] OR 2ND [facility]     Family History   Problem Relation Age of Onset    Hypertension Mother     Diabetes Father     Kidney disease Sister     Diabetes Brother      Social History     Tobacco Use    Smoking status: Never Smoker    Smokeless tobacco: Never Used   Substance Use Topics    Alcohol use: No    Drug use: No     Review of Systems   Constitutional: Positive for chills and fatigue. Negative for fever.   HENT: Negative for congestion, rhinorrhea and sore throat.    Eyes: Negative for photophobia and pain.   Respiratory: Positive for cough. Negative for apnea, shortness of breath and wheezing.    Cardiovascular: Positive for leg swelling. Negative for chest pain.   Gastrointestinal: Positive for diarrhea, nausea and vomiting. Negative for  abdominal pain, blood in stool and constipation.   Endocrine: Positive for polyuria.   Genitourinary: Negative for dysuria, hematuria and urgency.   Musculoskeletal: Negative for arthralgias, joint swelling and myalgias.   Neurological: Negative for dizziness, weakness, light-headedness and headaches.   Psychiatric/Behavioral: Negative for agitation, behavioral problems, confusion and decreased concentration.       Physical Exam     Initial Vitals [03/03/19 1237]   BP Pulse Resp Temp SpO2   114/66 99 18 98.2 °F (36.8 °C) 95 %      MAP       --         Physical Exam    Nursing note and vitals reviewed.  Constitutional: He appears well-developed and well-nourished. He is not diaphoretic. No distress.   HENT:   Head: Atraumatic.   Mouth/Throat: Oropharynx is clear and moist. No oropharyngeal exudate.   Eyes: No scleral icterus.   Neck: Normal range of motion. Neck supple.   Cardiovascular: Normal rate, regular rhythm, normal heart sounds and intact distal pulses.   No murmur heard.  Pulmonary/Chest: No stridor. No respiratory distress. He has decreased breath sounds in the right lower field. He has no wheezes. He has no rales.   Abdominal: Bowel sounds are normal. He exhibits no distension. There is no tenderness.   Musculoskeletal: Normal range of motion. He exhibits edema (nonpitting BLE). He exhibits no tenderness.   Lymphadenopathy:     He has no cervical adenopathy.   Neurological: He is alert and oriented to person, place, and time. He has normal strength.   Skin: Skin is warm and dry.   Psychiatric: He has a normal mood and affect. His behavior is normal. Judgment and thought content normal.         ED Course   Procedures  Labs Reviewed   COMPREHENSIVE METABOLIC PANEL - Abnormal; Notable for the following components:       Result Value    Sodium 135 (*)     Potassium 2.5 (*)     Chloride 91 (*)     Glucose 183 (*)     BUN, Bld 28 (*)     Creatinine 2.8 (*)     Calcium 7.2 (*)     Albumin 3.0 (*)     AST 78 (*)      eGFR if  27.9 (*)     eGFR if non  24.1 (*)     All other components within normal limits   CBC W/ AUTO DIFFERENTIAL - Abnormal; Notable for the following components:    WBC 21.08 (*)     RBC 3.20 (*)     Hemoglobin 9.8 (*)     Hematocrit 28.9 (*)     RDW 14.6 (*)     MPV 9.0 (*)     Immature Granulocytes 2.3 (*)     Gran # (ANC) 19.6 (*)     Immature Grans (Abs) 0.48 (*)     Lymph # 0.5 (*)     Gran% 93.1 (*)     Lymph% 2.3 (*)     Mono% 2.0 (*)     All other components within normal limits   CULTURE, BLOOD   CULTURE, BLOOD   CULTURE, CATHETER TIP  (AEROBIC)   LACTIC ACID, PLASMA   POCT INFLUENZA A/B MOLECULAR          Imaging Results          X-Ray Chest PA And Lateral (Final result)     Abnormal  Result time 03/03/19 14:28:16    Final result by Maral Gutierrez MD (03/03/19 14:28:16)                 Impression:      Interval malpositioning of the right central venous catheter with tip now coiled over the head of right clavicle.  Tip possibly in the right brachiocephalic, azygos or internal thoracic veins.  Recommend repositioning.    This report was flagged in Epic as abnormal.    Electronically signed by resident: Yvonne Santiago  Date:    03/03/2019  Time:    14:04    Electronically signed by: Maral Gutierrez  Date:    03/03/2019  Time:    14:28             Narrative:    EXAMINATION:  XR CHEST PA AND LATERAL    CLINICAL HISTORY:  Cough    TECHNIQUE:  PA and lateral views of the chest were performed.    COMPARISON:  Chest radiograph 02/13/2019    FINDINGS:  Sternotomy wires are aligned and intact.  Right-sided central venous catheter with tip migrated superiorly with tip overlying the right clavicular head.  The line creates an abnormal loop with projection over the right upper chest.    Persistent blunting of the left costophrenic sulcus suggestive of left-sided pleural effusion with associated compressive atelectasis.  No large consolidation or pneumothorax.    Cardiomediastinal  silhouette is unchanged from prior.    Visualized osseus structures demonstrate degenerative changes and no acute findings.                                 Medical Decision Making:   Differential Diagnosis:   PICC line infection  Bacteremia  Cellulitis  Clinical Tests:   Lab Tests: Ordered  Radiological Study: Ordered and Reviewed  Medical Tests: Ordered  ED Management:  2:14 PM  Labs, imaging ordered.  CXR shows PICC line inappropriately positioned.    3:03 PM  PICC line removed, tip sent for culture.  Admit to lung transplant team for further management.       APC / Resident Notes:   Patient is a 58M with PICC line placed a few weeks ago - review of CXR at that time showed proper placement, but it is currently displaced. Concern for bacteremia in patient with chills and foreign line placed - full septic workup initiated. PICC line removed with evidence of infection. Will admit to lung transplant for further management. Antibiotic treatment per primary team's discretion.                 Clinical Impression:       ICD-10-CM ICD-9-CM   1. Infection of peripherally inserted central venous catheter (PICC), initial encounter T80.219A 999.31   2. Cough R05 786.2   3. AVILA (acute kidney injury) N17.9 584.9         Disposition:   Disposition: Admitted  Condition: Fair                        Verónica Flores MD  Resident  03/03/19 1504       Verónica Flores MD  Resident  03/03/19 1507

## 2019-03-03 NOTE — Clinical Note
Catheter is inserted into the RA. Via the rt IJ and tunneled under the rt upper chest wall and sutured in place.

## 2019-03-03 NOTE — ASSESSMENT & PLAN NOTE
WBC of 21 concerning for possible bacteremia from PICC line. PICC removed while in ER. Tip sent for culture. Blood cultures x2 drawn in the ED. Procalcitonin significantly elevated at 11.32.     Will begin IV vancomycin and cefepime while awaiting culture results. BP and VSS otherwise stable at this time.

## 2019-03-03 NOTE — H&P
[facility]  Lung Transplant  H&P    Patient Name: Martin Hendrix Jr.  MRN: [id number]  Admission Date: 3/3/2019  Attending Physician: Darrick Wolfe MD  Primary Care Provider: Sukhi Dunham Jr, MD     Subjective:     History of Present Illness:  Mr. Martin Hendrix is a 59 YO male s/p BLT 18 months ago secondary to sarcoidosis with associated pulmonary hypertension. 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 Last Foscarnet infusion on 3/2 PM dose.     Patient presents to the ED today following order from lung transplant service as patient notified LUT team of swelling of the right arm where the PICC was located and symptoms of nausea, vomiting, and diarrhea over the past several days. Patient reports that two days ago he initially noted swelling of the right arm inferior to the PICC site that this later resolved. Patient reports that yesterday the swelling returned yesterday and continued to progress this morning, which prompted his call. Patient does report slight tenderness at the PICC site. Patient also notes having chills, body aches, and increasing feelings of malaise and fatigue over the past few days. Patient report 4-5 episodes of non-bloody emesis over the past two days but denies any episodes of vomiting today. Patient reports increasing episodes of diarrhea over the past week but is unable to ascertain if this is related to his increasing magnesium oxide supplementation that he was started on. Patient endorses poor appetite over the past few days as well. Patient does note a slightly increasing in frequency nature of his chronic, dry cough. Denies any dyspnea on exertion, chest pain. Denies any sinus congestion.     In  the ED, 1.5L total of NS fluids were administered. CXR revealed mal-position of the PICC, and per communication with ED staff, the PICC site appeared infected, so the PICC was removed with the tip sent for cultures. Influenza testing negative. CBC remarkable for elevated WBC of 25. CMP remarkable for several electrolyte imbalances as well as AVILA with Cr of 2.6. Patient admitted for IVF rehydration as well as IV antibiotics.         Past Medical History:   Diagnosis Date    AVILA (acute kidney injury) 8/27/2017    Atrial fibrillation 8/23/2017    On home oxygen therapy     KRISTYN on CPAP     Osteopenia     Pancreatitis 2009    hospitalized    Pulmonary hypertension     Pure hypercholesterolemia 11/15/2017    Sarcoidosis     Shortness of breath     Type 2 diabetes mellitus 11/5/2017       Past Surgical History:   Procedure Laterality Date    ABDOMINAL SURGERY      6 weeks old    BRONCHOSCOPY      CHEST TUBE INSERTION      CHOLECYSTECTOMY      COLONOSCOPY      EGD (ESOPHAGOGASTRODUODENOSCOPY) N/A 8/6/2018    Performed by Ramu Eisenberg MD at [facility] ENDO (2ND FLR)    flexible bronchoscopy with tissue biopsy N/A 3/21/2018    Performed by [facility] Diagnostic Provider at [facility] OR 2ND FLR    flexible bronchoscopy with tissue biopsy CPT 68758 N/A 8/22/2018    Performed by [facility] Diagnostic Provider at [facility] OR 2ND FLR    flexible bronchoscopy with tissue biopsy CPT 32882 N/A 4/19/2018    Performed by [facility] Diagnostic Provider at [facility] OR 2ND FLR    flexible bronchoscopy with tissue biopsy CPT 86379 N/A 2/21/2018    Performed by [facility] Diagnostic Provider at [facility] OR 2ND FLR    flexible bronchoscopy with tissue biopsy CPT 85536 N/A 12/20/2017    Performed by [facility] Diagnostic Provider at [facility] OR 2ND FLR    flexible bronchoscopy with tissue biopsy CPT 88704 N/A 11/22/2017    Performed by [facility] Diagnostic Provider at [facility] OR 2ND FLR    flexible bronchoscopy with tissue biopsy CPT 39858 N/A 11/2/2017    Performed by [facility]  Diagnostic Provider at [facility] OR 2ND FLR    flexible bronchoscopy with tissue biopsy CPT 30040 N/A 10/4/2017    Performed by [facility] Diagnostic Provider at [facility] OR 2ND FLR    flexible bronchoscopy with tissue biopy CPT 99592 N/A 11/20/2018    Performed by [facility] Diagnostic Provider at [facility] OR 2ND FLR    HEART CATH-RIGHT Right 5/22/2017    Performed by Shanell Higuera MD at [facility] CATH LAB    LUNG BIOPSY      LUNG TRANSPLANT  08/2017    PH MONITORING, ESOPHAGUS, WIRELESS, (OFF REFLUX MEDS) N/A 8/6/2018    Performed by Ramu Eisenberg MD at [facility] ENDO (2ND FLR)    TRANSPLANT-LUNG Bilateral 8/22/2017    Performed by Paulo German MD at [facility] OR 2ND FLR       Review of patient's allergies indicates:  No Known Allergies    PTA Medications   Medication Sig    amLODIPine (NORVASC) 5 MG tablet Take 1 tablet (5 mg total) by mouth once daily.    CALCIUM 600 + D,3, 600 mg(1,500mg) -200 unit Tab TAKE 1 TABLET BY MOUTH TWICE DAILY    ECOTRIN LOW STRENGTH 81 mg EC tablet TAKE 1 TABLET DAILY    famotidine (PEPCID) 20 MG tablet TAKE 1 TABLET TWICE A DAY    fluticasone (FLONASE) 50 mcg/actuation nasal spray 1 spray by Each Nare route once daily.    folic acid (FOLVITE) 1 MG tablet TAKE 1 TABLET DAILY    foscarnet in dextrose 5 % infusion Inject 750 mLs (9,000 mg total) into the vein every 12 (twelve) hours.    insulin degludec (TRESIBA FLEXTOUCH U-200) 200 unit/mL (3 mL) InPn Inject 20 Units into the skin every evening.    insulin lispro (HUMALOG KWIKPEN INSULIN) 100 unit/mL InPn pen Inject 14 Units into the skin 3 (three) times daily before meals. + correction scale TDD: 60 units    magnesium oxide (MAG-OX) 400 mg (241.3 mg magnesium) tablet Take 2 tablets (800 mg total) by mouth 3 (three) times daily.    metFORMIN (GLUCOPHAGE-XR) 500 MG 24 hr tablet TAKE 2 TABLETS BY MOUTH TWICE DAILY WITH MEALS    multivitamin (THERAGRAN) per tablet Take 1 tablet by mouth once daily.    potassium chloride (KLOR-CON) 10 MEQ TbSR  "Take 1 tablet (10 mEq total) by mouth once daily.    pravastatin (PRAVACHOL) 20 MG tablet TAKE 1 TABLET BY MOUTH ONCE DAILY    predniSONE (DELTASONE) 5 MG tablet Take 1 tablet (5 mg total) by mouth once daily.    sulfamethoxazole-trimethoprim 800-160mg (BACTRIM DS) 800-160 mg Tab Take 1 tablet by mouth every Mon, Wed, Fri.    tacrolimus (PROGRAF) 0.5 MG Cap Take 3 capsules (1.5 mg total) by mouth every 12 (twelve) hours.    furosemide (LASIX) 40 MG tablet Take 1 tablet (40 mg total) by mouth daily as needed.    lancets Misc To check BG 4 times daily, to use with insurance preferred meter    ONETOUCH VERIO Strp USE TO CHECK BLOOD GLUCOSE FOUR TIMES A DAY, TO USE WITH INSURANCE PREFERRED METER    pen needle, diabetic (BD ULTRA-FINE JIM PEN NEEDLES) 32 gauge x 5/32" Ndle Uses 4 times daily, on multiple daily insulin injections    potassium chloride SA (K-DUR,KLOR-CON) 20 MEQ tablet Take 1 tablet (20 mEq total) by mouth once daily.     Family History     Problem Relation (Age of Onset)    Diabetes Father, Brother    Hypertension Mother    Kidney disease Sister        Tobacco Use    Smoking status: Never Smoker    Smokeless tobacco: Never Used   Substance and Sexual Activity    Alcohol use: No    Drug use: No    Sexual activity: Not on file     Review of Systems   Constitutional: Positive for appetite change, chills and fatigue. Negative for diaphoresis and fever.   HENT: Positive for voice change. Negative for congestion, rhinorrhea, sinus pain and sore throat.    Eyes: Negative for discharge and redness.   Respiratory: Positive for cough. Negative for shortness of breath and wheezing.    Cardiovascular: Positive for leg swelling. Negative for chest pain.   Gastrointestinal: Positive for diarrhea, nausea and vomiting. Negative for abdominal distention and abdominal pain.   Endocrine: Negative for polydipsia and polyuria.   Genitourinary: Negative for dysuria, frequency and urgency.   Musculoskeletal: " Positive for myalgias. Negative for back pain, neck pain and neck stiffness.   Skin: Positive for color change (RUE). Negative for pallor.   Allergic/Immunologic: Positive for immunocompromised state.   Neurological: Negative for dizziness, light-headedness and headaches.   Hematological: Negative for adenopathy. Does not bruise/bleed easily.   Psychiatric/Behavioral: Negative for agitation, confusion and decreased concentration.     Objective:     Vital Signs (Most Recent):  Temp: 98.2 °F (36.8 °C) (03/03/19 1237)  Pulse: 99 (03/03/19 1237)  Resp: 18 (03/03/19 1237)  BP: 114/66 (03/03/19 1237)  SpO2: 95 % (03/03/19 1237) Vital Signs (24h Range):  Temp:  [98.2 °F (36.8 °C)] 98.2 °F (36.8 °C)  Pulse:  [99] 99  Resp:  [18] 18  SpO2:  [95 %] 95 %  BP: (114)/(66) 114/66     Weight: 99.8 kg (220 lb)  Body mass index is 30.68 kg/m².    No intake or output data in the 24 hours ending 03/03/19 1731    Physical Exam   Constitutional: He is oriented to person, place, and time. He appears well-developed and well-nourished. No distress.   HENT:   Head: Normocephalic and atraumatic.   Right Ear: External ear normal.   Left Ear: External ear normal.   Nose: Nose normal.   Mouth/Throat: Oropharynx is clear and moist.   Eyes: Conjunctivae and EOM are normal. Right eye exhibits no discharge. Left eye exhibits no discharge.   Neck: Normal range of motion. Neck supple. No JVD present.   Cardiovascular: Normal rate, regular rhythm and normal heart sounds. Exam reveals no gallop and no friction rub.   No murmur heard.  Pulmonary/Chest: Effort normal. He has no wheezes. He has no rhonchi. He has no rales.   Well-healed mid-line sternotomy incision   Abdominal: Soft. Bowel sounds are normal. He exhibits no distension. There is no tenderness.   Musculoskeletal: Normal range of motion. He exhibits edema (RUE with associated erythema; BLE to ankle).   Neurological: He is alert and oriented to person, place, and time.   Skin: Skin is warm and  dry. He is not diaphoretic. There is erythema.   RUE erythema localized to the right hand   Psychiatric: He has a normal mood and affect. His behavior is normal.   Nursing note and vitals reviewed.      Significant Labs:  CBC:  Recent Labs   Lab 03/03/19  1353   WBC 21.08*   RBC 3.20*   HGB 9.8*   HCT 28.9*      MCV 90   MCH 30.6   MCHC 33.9     BMP:  Recent Labs   Lab 03/03/19  1353   *   K 2.5*   CL 91*   CO2 28   BUN 28*   CREATININE 2.8*   CALCIUM 7.2*        I have reviewed all pertinent labs within the past 24 hours.    Diagnostic Results:  Labs: Reviewed  X-Ray: Reviewed    Assessment/Plan:     * Cellulitis of right upper extremity    WBC of 21 concerning for possible bacteremia versus cellulitis from PICC line. PICC removed while in ER. Tip sent for culture. Blood cultures x2 drawn in the ED. Procalcitonin significantly elevated at 11.32.       BUE and BLE ultrasound ordered to rule out possible DVT.   Will begin IV vancomycin and cefepime while awaiting culture results. BP and VSS otherwise stable at this time.      Acute kidney injury    Cr noted to increase from 1.6 on 3/1 to 2.8 today. Likely medication induced as patient is currently receiving Foscarnet infusions as well as nephrotoxic tacrolimus for immunosuppression.     1.5L IVF administered while in the ER. Will hold nephrotoxic medications tacrolimus and Bactrim while waiting for resolution for AVILA.     Will continue to hold Foscarnet infusions until Cr and electrolyte imbalances improve.      Lung replaced by transplant    Patient is s/p BLT 18 months ago secondary to sarcoidosis with associated pulmonary hypertension. 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 Last  Foscarnet infusion on 3/2 PM dose.      Will hold nephrotoxic immunosuppression of tacrolimus while AVILA resolves. Continue immunosuppression with prednisone 5 mg daily. Oxygenation currently stable on RA.      Immunosuppression    Current outpatient regimen: tacrolimus 1.5 mg BID and prednisone 5 mg daily.       Will hold tacrolimus in setting of AVILA. Will monitor daily tacrolimus levels while inpatient and monitor for supertherapeutic levels given current AVILA. Continue prednisone 5 mg daily.     Prophylactic antibiotic    Will hold Bactrim while awaiting repeat CMP results for tomorrow.      Cytomegalovirus (CMV) viremia    Started on Foscarnet IV infusions BID on 2/13/2019 and has been closely followed by ID, Dr. Lowry.     ID consulted, appreciate recs.     Will continue to hold Foscarnet infusions for now while awaiting resolution of AVILA and electrolyte imbalances.      Hypokalemia    Secondary to Foscarnet therapy. Will continue with KCl 20 mEq daily.  Electrolyte replacement orders available as needed.      Hypomagnesemia    Secondary to Foscarnet infusions. Will continue Mag Ox 800 mg TID as previously ordered as outpatient. Mag replacement orders available as needed.      Type 2 diabetes mellitus with hyperglycemia, with long-term current use of insulin    Endocrine consulted, appreciate recs.         Patricia Fowler PA-C  Lung Transplant  [facility]

## 2019-03-03 NOTE — ASSESSMENT & PLAN NOTE
Secondary to Foscarnet therapy. Will continue with KCl 20 mEq daily.  Electrolyte replacement orders available as needed.

## 2019-03-03 NOTE — ED TRIAGE NOTES
Pt reports noticing R arm swelling Thursday afternoon that resolved then returned Friday. Pt reports swelling has continued to worsen since Friday. Denies pain or fever, reports intermittent n/v/d since Friday.

## 2019-03-03 NOTE — ASSESSMENT & PLAN NOTE
Patient is s/p BLT 18 months ago secondary to sarcoidosis with associated pulmonary hypertension. 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 Last Foscarnet infusion on 3/2 PM dose.      Will hold nephrotoxic immunosuppression of tacrolimus while AVILA resolves. Continue immunosuppression with prednisone 5 mg daily. Oxygenation currently stable on RA.

## 2019-03-04 PROBLEM — I82.409 ACUTE DEEP VEIN THROMBOSIS (DVT): Status: ACTIVE | Noted: 2019-03-04

## 2019-03-04 LAB
ALBUMIN SERPL BCP-MCNC: 2.6 G/DL
ALBUMIN SERPL BCP-MCNC: 3 G/DL
ALP SERPL-CCNC: 47 U/L
ALP SERPL-CCNC: 52 U/L
ALT SERPL W/O P-5'-P-CCNC: 35 U/L
ALT SERPL W/O P-5'-P-CCNC: 44 U/L
ANION GAP SERPL CALC-SCNC: 10 MMOL/L
ANION GAP SERPL CALC-SCNC: 11 MMOL/L
AST SERPL-CCNC: 60 U/L
AST SERPL-CCNC: 67 U/L
BASOPHILS # BLD AUTO: 0.04 K/UL
BASOPHILS NFR BLD: 0.3 %
BILIRUB SERPL-MCNC: 0.5 MG/DL
BILIRUB SERPL-MCNC: 0.5 MG/DL
BUN SERPL-MCNC: 31 MG/DL
BUN SERPL-MCNC: 33 MG/DL
C DIFF GDH STL QL: NEGATIVE
C DIFF TOX A+B STL QL IA: NEGATIVE
CA-I BLDV-SCNC: 0.89 MMOL/L
CALCIUM SERPL-MCNC: 6.7 MG/DL
CALCIUM SERPL-MCNC: 7.6 MG/DL
CHLORIDE SERPL-SCNC: 97 MMOL/L
CHLORIDE SERPL-SCNC: 97 MMOL/L
CO2 SERPL-SCNC: 28 MMOL/L
CO2 SERPL-SCNC: 29 MMOL/L
CREAT SERPL-MCNC: 3 MG/DL
CREAT SERPL-MCNC: 3.1 MG/DL
DIFFERENTIAL METHOD: ABNORMAL
EOSINOPHIL # BLD AUTO: 0.2 K/UL
EOSINOPHIL NFR BLD: 1.5 %
ERYTHROCYTE [DISTWIDTH] IN BLOOD BY AUTOMATED COUNT: 14.7 %
EST. GFR  (AFRICAN AMERICAN): 24.7 ML/MIN/1.73 M^2
EST. GFR  (AFRICAN AMERICAN): 25.7 ML/MIN/1.73 M^2
EST. GFR  (NON AFRICAN AMERICAN): 21.3 ML/MIN/1.73 M^2
EST. GFR  (NON AFRICAN AMERICAN): 22.2 ML/MIN/1.73 M^2
GLUCOSE SERPL-MCNC: 108 MG/DL
GLUCOSE SERPL-MCNC: 129 MG/DL
HCT VFR BLD AUTO: 26.1 %
HGB BLD-MCNC: 8.8 G/DL
IMM GRANULOCYTES # BLD AUTO: 0.29 K/UL
IMM GRANULOCYTES NFR BLD AUTO: 1.9 %
LACTATE SERPL-SCNC: 0.9 MMOL/L
LYMPHOCYTES # BLD AUTO: 0.6 K/UL
LYMPHOCYTES NFR BLD: 4.3 %
MAGNESIUM SERPL-MCNC: 1.8 MG/DL
MCH RBC QN AUTO: 29.8 PG
MCHC RBC AUTO-ENTMCNC: 33.7 G/DL
MCV RBC AUTO: 89 FL
MONOCYTES # BLD AUTO: 0.3 K/UL
MONOCYTES NFR BLD: 2.1 %
NEUTROPHILS # BLD AUTO: 13.4 K/UL
NEUTROPHILS NFR BLD: 89.9 %
NRBC BLD-RTO: 0 /100 WBC
PLATELET # BLD AUTO: 267 K/UL
PMV BLD AUTO: 8.6 FL
POCT GLUCOSE: 102 MG/DL (ref 70–110)
POCT GLUCOSE: 125 MG/DL (ref 70–110)
POCT GLUCOSE: 132 MG/DL (ref 70–110)
POCT GLUCOSE: 163 MG/DL (ref 70–110)
POTASSIUM SERPL-SCNC: 2.6 MMOL/L
POTASSIUM SERPL-SCNC: 3.2 MMOL/L
POTASSIUM SERPL-SCNC: 3.9 MMOL/L
PROT SERPL-MCNC: 6 G/DL
PROT SERPL-MCNC: 7.1 G/DL
RBC # BLD AUTO: 2.95 M/UL
SODIUM SERPL-SCNC: 136 MMOL/L
SODIUM SERPL-SCNC: 136 MMOL/L
TACROLIMUS BLD-MCNC: 11 NG/ML
VANCOMYCIN SERPL-MCNC: 18.7 UG/ML
WBC # BLD AUTO: 14.9 K/UL

## 2019-03-04 PROCEDURE — 83605 ASSAY OF LACTIC ACID: CPT

## 2019-03-04 PROCEDURE — 85025 COMPLETE CBC W/AUTO DIFF WBC: CPT

## 2019-03-04 PROCEDURE — 99232 SBSQ HOSP IP/OBS MODERATE 35: CPT | Mod: ,,, | Performed by: NURSE PRACTITIONER

## 2019-03-04 PROCEDURE — 84132 ASSAY OF SERUM POTASSIUM: CPT

## 2019-03-04 PROCEDURE — 82330 ASSAY OF CALCIUM: CPT

## 2019-03-04 PROCEDURE — 80197 ASSAY OF TACROLIMUS: CPT

## 2019-03-04 PROCEDURE — 80053 COMPREHEN METABOLIC PANEL: CPT

## 2019-03-04 PROCEDURE — 99223 1ST HOSP IP/OBS HIGH 75: CPT | Mod: AI,,, | Performed by: INTERNAL MEDICINE

## 2019-03-04 PROCEDURE — 63600175 PHARM REV CODE 636 W HCPCS: Performed by: HOSPITALIST

## 2019-03-04 PROCEDURE — 99223 PR INITIAL HOSPITAL CARE,LEVL III: ICD-10-PCS | Mod: AI,,, | Performed by: INTERNAL MEDICINE

## 2019-03-04 PROCEDURE — 25000003 PHARM REV CODE 250: Performed by: PHYSICIAN ASSISTANT

## 2019-03-04 PROCEDURE — 25000003 PHARM REV CODE 250: Performed by: INTERNAL MEDICINE

## 2019-03-04 PROCEDURE — 99232 PR SUBSEQUENT HOSPITAL CARE,LEVL II: ICD-10-PCS | Mod: ,,, | Performed by: NURSE PRACTITIONER

## 2019-03-04 PROCEDURE — 99233 SBSQ HOSP IP/OBS HIGH 50: CPT | Mod: ,,, | Performed by: INTERNAL MEDICINE

## 2019-03-04 PROCEDURE — 36415 COLL VENOUS BLD VENIPUNCTURE: CPT

## 2019-03-04 PROCEDURE — 20600001 HC STEP DOWN PRIVATE ROOM

## 2019-03-04 PROCEDURE — 99233 PR SUBSEQUENT HOSPITAL CARE,LEVL III: ICD-10-PCS | Mod: ,,, | Performed by: INTERNAL MEDICINE

## 2019-03-04 PROCEDURE — 80202 ASSAY OF VANCOMYCIN: CPT

## 2019-03-04 PROCEDURE — 80053 COMPREHEN METABOLIC PANEL: CPT | Mod: 91

## 2019-03-04 PROCEDURE — 63600175 PHARM REV CODE 636 W HCPCS: Performed by: PHYSICIAN ASSISTANT

## 2019-03-04 PROCEDURE — 83735 ASSAY OF MAGNESIUM: CPT

## 2019-03-04 PROCEDURE — 63600175 PHARM REV CODE 636 W HCPCS: Performed by: INTERNAL MEDICINE

## 2019-03-04 RX ORDER — INSULIN ASPART 100 [IU]/ML
10 INJECTION, SOLUTION INTRAVENOUS; SUBCUTANEOUS
Status: DISCONTINUED | OUTPATIENT
Start: 2019-03-04 | End: 2019-03-06

## 2019-03-04 RX ORDER — SODIUM CHLORIDE 9 MG/ML
INJECTION, SOLUTION INTRAVENOUS CONTINUOUS
Status: ACTIVE | OUTPATIENT
Start: 2019-03-04 | End: 2019-03-04

## 2019-03-04 RX ORDER — ENOXAPARIN SODIUM 100 MG/ML
1 INJECTION SUBCUTANEOUS
Status: DISCONTINUED | OUTPATIENT
Start: 2019-03-04 | End: 2019-03-05

## 2019-03-04 RX ADMIN — INSULIN ASPART 10 UNITS: 100 INJECTION, SOLUTION INTRAVENOUS; SUBCUTANEOUS at 05:03

## 2019-03-04 RX ADMIN — CEFEPIME 1 G: 1 INJECTION, POWDER, FOR SOLUTION INTRAMUSCULAR; INTRAVENOUS at 06:03

## 2019-03-04 RX ADMIN — POTASSIUM CHLORIDE 40 MEQ: 20 SOLUTION ORAL at 04:03

## 2019-03-04 RX ADMIN — INSULIN ASPART 12 UNITS: 100 INJECTION, SOLUTION INTRAVENOUS; SUBCUTANEOUS at 11:03

## 2019-03-04 RX ADMIN — FAMOTIDINE 20 MG: 20 TABLET ORAL at 10:03

## 2019-03-04 RX ADMIN — PRAVASTATIN SODIUM 20 MG: 20 TABLET ORAL at 08:03

## 2019-03-04 RX ADMIN — SODIUM CHLORIDE: 0.9 INJECTION, SOLUTION INTRAVENOUS at 04:03

## 2019-03-04 RX ADMIN — ENOXAPARIN SODIUM 100 MG: 100 INJECTION SUBCUTANEOUS at 10:03

## 2019-03-04 RX ADMIN — CEFEPIME 1 G: 1 INJECTION, POWDER, FOR SOLUTION INTRAMUSCULAR; INTRAVENOUS at 04:03

## 2019-03-04 RX ADMIN — PREDNISONE 5 MG: 5 TABLET ORAL at 08:03

## 2019-03-04 RX ADMIN — SODIUM CHLORIDE: 0.9 INJECTION, SOLUTION INTRAVENOUS at 10:03

## 2019-03-04 RX ADMIN — POTASSIUM CHLORIDE 60 MEQ: 20 SOLUTION ORAL at 04:03

## 2019-03-04 RX ADMIN — INSULIN ASPART 12 UNITS: 100 INJECTION, SOLUTION INTRAVENOUS; SUBCUTANEOUS at 08:03

## 2019-03-04 RX ADMIN — CALCIUM GLUCONATE 1000 MG: 94 INJECTION, SOLUTION INTRAVENOUS at 05:03

## 2019-03-04 RX ADMIN — MAGNESIUM OXIDE TAB 400 MG (241.3 MG ELEMENTAL MG) 800 MG: 400 (241.3 MG) TAB at 08:03

## 2019-03-04 RX ADMIN — ASPIRIN 81 MG: 81 TABLET, COATED ORAL at 08:03

## 2019-03-04 RX ADMIN — CALCIUM GLUCONATE 1 G: 94 INJECTION, SOLUTION INTRAVENOUS at 02:03

## 2019-03-04 RX ADMIN — POTASSIUM CHLORIDE 40 MEQ: 20 SOLUTION ORAL at 02:03

## 2019-03-04 RX ADMIN — POTASSIUM CHLORIDE 60 MEQ: 20 SOLUTION ORAL at 06:03

## 2019-03-04 RX ADMIN — FAMOTIDINE 20 MG: 20 TABLET ORAL at 08:03

## 2019-03-04 RX ADMIN — MAGNESIUM 64 MG (MAGNESIUM CHLORIDE) TABLET,DELAYED RELEASE 128 MG: at 11:03

## 2019-03-04 RX ADMIN — VANCOMYCIN HYDROCHLORIDE 1000 MG: 1 INJECTION, POWDER, LYOPHILIZED, FOR SOLUTION INTRAVENOUS at 11:03

## 2019-03-04 RX ADMIN — POTASSIUM CHLORIDE 20 MEQ: 1500 TABLET, EXTENDED RELEASE ORAL at 08:03

## 2019-03-04 RX ADMIN — FOLIC ACID 1000 MCG: 1 TABLET ORAL at 08:03

## 2019-03-04 NOTE — ASSESSMENT & PLAN NOTE
Secondary to Foscarnet therapy. Will continue with KCl 20 mEq daily.  Continue cardiac monitoring. Electrolyte replacement orders available as needed.

## 2019-03-04 NOTE — ASSESSMENT & PLAN NOTE
Avoid insulin stacking and hypoglycemia.  Lab Results   Component Value Date    CREATININE 3.0 (H) 03/04/2019

## 2019-03-04 NOTE — PROGRESS NOTES
[facility]  Lung Transplant  Progress Note - Floor    Patient Name: Martin Hendrix Jr.  MRN: [id number]  Admission Date: 3/3/2019  Hospital Length of Stay: 1 days  Post-Operative Day: 559  Attending Physician: Darrick Wolfe MD  Primary Care Provider: Sukhi Dunham Jr, MD     Subjective:     Interval History: Patient with asymptomatic desaturations overnight to the 80s. STAT CXR overnight unremarkable and IVF were d/c. Patient reports this morning that he does have sleep apnea but is non-compliant with CPAP use. Patient continues to report intermittent episodes of diarrhea. No further episodes of nausea or vomiting, but does remain with decreased appetite. C. Diff testing negative.     Will plan to resume IVF at 100cc/hr for 10 hours. Continues to report chronic dry cough. Oxygen weaned to 2L this morning with O2 sats of 98% on time of exam. Continue to wean oxygen on patient to maintain O2 sats > 88%.     Continuous Infusions:   sodium chloride 0.9% 100 mL/hr at 03/04/19 1010     Scheduled Meds:   aspirin  81 mg Oral Daily    ceFEPime (MAXIPIME) IVPB  1 g Intravenous Q12H    enoxaparin  1 mg/kg Subcutaneous Q12H    famotidine  20 mg Oral BID    fluticasone  1 spray Each Nare Daily    folic acid  1,000 mcg Oral Daily    insulin aspart U-100  12 Units Subcutaneous TIDWM    insulin detemir U-100  20 Units Subcutaneous QHS    magnesium chloride  128 mg Oral Daily    potassium chloride SA  20 mEq Oral Daily    pravastatin  20 mg Oral Daily    predniSONE  5 mg Oral Daily    vancomycin (VANCOCIN) IVPB (custom)  1,000 mg Intravenous Q24H     PRN Meds:acetaminophen, calcium gluconate IVPB, calcium gluconate IVPB, calcium gluconate IVPB, dextrose 50%, dextrose 50%, glucagon (human recombinant), glucose, glucose, insulin aspart U-100, magnesium sulfate IVPB **AND** magnesium sulfate IVPB, ondansetron, potassium chloride 10% **AND** potassium chloride 10% **AND** potassium chloride 10%,  promethazine (PHENERGAN) IVPB    Review of patient's allergies indicates:  No Known Allergies    Review of Systems   Constitutional: Positive for appetite change, chills and fatigue. Negative for diaphoresis and fever.   HENT: Positive for voice change. Negative for congestion, rhinorrhea, sinus pain and sore throat.    Eyes: Negative for discharge and redness.   Respiratory: Positive for cough. Negative for shortness of breath and wheezing.    Cardiovascular: Positive for leg swelling. Negative for chest pain.   Gastrointestinal: Positive for diarrhea, nausea and vomiting. Negative for abdominal distention and abdominal pain.   Endocrine: Negative for polydipsia and polyuria.   Genitourinary: Negative for dysuria, frequency and urgency.   Musculoskeletal: Positive for myalgias. Negative for back pain, neck pain and neck stiffness.   Skin: Positive for color change (RUE). Negative for pallor.   Allergic/Immunologic: Positive for immunocompromised state.   Neurological: Negative for dizziness, light-headedness and headaches.   Hematological: Negative for adenopathy. Does not bruise/bleed easily.   Psychiatric/Behavioral: Negative for agitation, confusion and decreased concentration.     Objective:   Physical Exam   Constitutional: He is oriented to person, place, and time. He appears well-developed and well-nourished. No distress.   HENT:   Head: Normocephalic and atraumatic.   Right Ear: External ear normal.   Left Ear: External ear normal.   Nose: Nose normal.   Mouth/Throat: Oropharynx is clear and moist.   Eyes: Conjunctivae and EOM are normal. Right eye exhibits no discharge. Left eye exhibits no discharge.   Neck: Normal range of motion. Neck supple. No JVD present.   Cardiovascular: Normal rate, regular rhythm and normal heart sounds. Exam reveals no gallop and no friction rub.   No murmur heard.  Pulmonary/Chest: Effort normal. He has no wheezes. He has no rhonchi. He has no rales.   Well-healed mid-line  sternotomy incision   Abdominal: Soft. Bowel sounds are normal. He exhibits no distension. There is no tenderness.   Musculoskeletal: Normal range of motion. He exhibits edema (RUE with associated erythema; BLE to ankle).   Neurological: He is alert and oriented to person, place, and time.   Skin: Skin is warm and dry. He is not diaphoretic. There is erythema.   RUE erythema localized to the right hand  Old PICC site superior to the antecubital area with area of 2 cm circular erythema with yellow crusting at point of insertion   Psychiatric: He has a normal mood and affect. His behavior is normal.   Nursing note and vitals reviewed.        Vital Signs (Most Recent):  Temp: 97.8 °F (36.6 °C) (03/04/19 0830)  Pulse: 102 (03/04/19 0830)  Resp: 20 (03/04/19 0830)  BP: 131/89 (03/04/19 0830)  SpO2: 96 % (03/04/19 0830) Vital Signs (24h Range):  Temp:  [97.8 °F (36.6 °C)-98.2 °F (36.8 °C)] 97.8 °F (36.6 °C)  Pulse:  [] 102  Resp:  [14-29] 20  SpO2:  [86 %-97 %] 96 %  BP: (107-131)/(66-89) 131/89     Weight: 99.8 kg (220 lb)  Body mass index is 30.68 kg/m².      Intake/Output Summary (Last 24 hours) at 3/4/2019 1016  Last data filed at 3/4/2019 0500  Gross per 24 hour   Intake 740 ml   Output 650 ml   Net 90 ml       Significant Labs:  CBC:  Recent Labs   Lab 03/04/19 0257   WBC 14.90*   RBC 2.95*   HGB 8.8*   HCT 26.1*      MCV 89   MCH 29.8   MCHC 33.7     BMP:  Recent Labs   Lab 03/04/19 0257      K 2.6*   CL 97   CO2 28   BUN 33*   CREATININE 3.0*   CALCIUM 6.7*      Tacrolimus Levels:  Recent Labs   Lab 03/04/19 0257   TACROLIMUS 11.0     Microbiology:  Microbiology Results (last 7 days)     Procedure Component Value Units Date/Time    IV catheter culture [[id number]] Collected:  03/03/19 1502    Order Status:  Completed Specimen:  Catheter Tip, PICC Updated:  03/04/19 0718     Aerobic Culture - Cath tip No growth    Clostridium difficile EIA [[id number]] Collected:  03/03/19 2107    Order Status:   Completed Specimen:  Stool Updated:  03/04/19 0353     C. diff Antigen Negative     C difficile Toxins A+B, EIA Negative     Comment: Testing not recommended for children <24 months old.       Blood culture #2 **CANNOT BE ORDERED STAT** [[id number]] Collected:  03/03/19 1443    Order Status:  Completed Specimen:  Blood from Peripheral, Wrist, Left Updated:  03/04/19 0115     Blood Culture, Routine No Growth to date    Blood culture #1 **CANNOT BE ORDERED STAT** [[id number]] Collected:  03/03/19 1352    Order Status:  Completed Specimen:  Blood from Peripheral, Antecubital, Left Updated:  03/03/19 2115     Blood Culture, Routine No Growth to date          I have reviewed all pertinent labs within the past 24 hours.    Diagnostic Results:  Labs: Reviewed  X-Ray: Reviewed  US: Reviewed      Assessment/Plan:     * Cellulitis of right upper extremity    WBC of 21 concerning for possible bacteremia versus cellulitis from PICC line. PICC removed while in ER. Tip sent for culture. Blood cultures x2 drawn in the ED. Procalcitonin significantly elevated at 11.32.       WBC with downtrend to 14.9 today.  Continue IV vancomycin and cefepime while awaiting blood and catheter tip culture results. Treatment dose of Lovenox started for RUE DVT.         Acute kidney injury    Cr noted to increase from 1.6 on 3/1 to 2.8 today. Likely medication induced as patient is currently receiving Foscarnet infusions as well as nephrotoxic tacrolimus for immunosuppression.     1.5L IVF administered while in the ER. IVF started at 100cc/hr overnight; however, infusion was paused due to concern for oxygen desaturations. IVF resumed at 100 cc/hr this morning with plans to complete 10 hour course to achieve 1L of fluids being administered.  Will hold nephrotoxic medications tacrolimus and Bactrim while waiting for resolution for AVILA. Cr with noted increase to 3 today.    Will continue to hold Foscarnet infusions until Cr and electrolyte imbalances  improve.      Lung replaced by transplant    Patient is s/p BLT 18 months ago secondary to sarcoidosis with associated pulmonary hypertension. 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 Last Foscarnet infusion on 3/2 PM dose.      Will hold nephrotoxic immunosuppression of tacrolimus while AVILA resolves. Continue immunosuppression with prednisone 5 mg daily. Oxygenation desats overnight likely due to patient's history of sleep apnea. Patient is not compliant with his home CPAP machine, and is unable to recall the previous settings at this time.     Wean oxygen supplementation throughout the day to room air. Maintain O2 sats > 88%. Encourage ambulation.      Immunosuppression    Current outpatient regimen: tacrolimus 1.5 mg BID and prednisone 5 mg daily.       Will continue to hold tacrolimus in setting of AVILA. Will monitor daily tacrolimus levels while inpatient and monitor for supertherapeutic levels given current AVILA. Continue prednisone 5 mg daily.     Prophylactic antibiotic    Will hold Bactrim due to nephrotoxic side effects.      Cytomegalovirus (CMV) viremia    Started on Foscarnet IV infusions BID on 2/13/2019 and has been closely followed by ID, Dr. Lowry.     ID consulted, appreciate recs.     Will continue to hold Foscarnet infusions for now while awaiting resolution of AVILA and electrolyte imbalances.      Hypokalemia    Secondary to Foscarnet therapy. Will continue with KCl 20 mEq daily.  Continue cardiac monitoring. Electrolyte replacement orders available as needed.        Hypomagnesemia    Secondary to Foscarnet infusions. Previously on Mag Ox 800 mg TID as outpatient; however, mag supplementation could be contributing to his diarrhea.  Magnesium chloride supplementation started.     Mg normal at 1.8 today. Mag replacement orders available as needed.      Type 2 diabetes mellitus with hyperglycemia, with long-term current use of insulin    Endocrine consulted, appreciate recs.     Acute deep vein thrombosis (DVT)    BUE and BLE US overnight revealed a right brachial vein with partially occlusive thrombosis and small amount of nonocclusive thrombus visualized in the basilic vein. BLE US did reveal occlusive thrombosis in the left greater saphenous vein.     Treatment dose of Lovenox started at 100 mg BID.          Patricia Fowler PA-C  Lung Transplant  [facility]

## 2019-03-04 NOTE — SUBJECTIVE & OBJECTIVE
Interval HPI:   Overnight events: Admitted to TSU. BG at goal with scheduled insulin. Prednisone 5 mg daily. Creatinine elevated at 3.   Eatin%  Nausea: No  Hypoglycemia and intervention: No  Fever: No  TPN and/or TF: No    PMH, PSH, FH, SH  reviewed     Review of Systems   Constitutional:+ for weight changes.  Eyes: Negative for visual disturbance.  Respiratory: + for cough.   Cardiovascular: Negative for chest pain.  Gastrointestinal: Negative for nausea; decreased appetite  Endocrine: + for polyuria, polydipsia.  Musculoskeletal: Negative for back pain.  Skin: Negative for rash.  Neurological: Negative for syncope.  Psychiatric/Behavioral: Negative for depression.      Current Medications and/or Treatments Impacting Glycemic Control  Immunotherapy:    Immunosuppressants     None        Steroids:   Hormones (From admission, onward)    Start     Stop Route Frequency Ordered    19 0900  predniSONE tablet 5 mg      -- Oral Daily 19 1517        Pressors:    Autonomic Drugs (From admission, onward)    None        Hyperglycemia/Diabetes Medications:   Antihyperglycemics (From admission, onward)    Start     Stop Route Frequency Ordered    19 0715  insulin aspart U-100 pen 12 Units      -- SubQ 3 times daily with meals 19 1645    19 2100  insulin detemir U-100 pen 20 Units      -- SubQ Nightly 19 1645    19 1745  insulin aspart U-100 pen 0-5 Units      -- SubQ Before meals & nightly PRN 19 1645             PHYSICAL EXAMINATION:  Vitals:    19 0830   BP: 131/89   Pulse: 102   Resp: 20   Temp: 97.8 °F (36.6 °C)     Body mass index is 30.68 kg/m².    Physical Exam   Constitutional: Well developed, well nourished, NAD.  ENT: External ears no masses with nose patent; normal hearing.  Neck: Supple; trachea midline.  Cardiovascular: Normal heart sounds, + LE edema. DP +2 bilaterally.  Lungs: Normal effort; lungs anterior bilaterally clear to auscultation.  Abdomen:  Soft, no masses, no hernias.  MS: No clubbing or cyanosis of nails noted; unable to assess gait.  Skin: No rashes, lesions, or ulcers; no nodules. Injection sites are ok. No lipo hypertropthy or atrophy. Edema and slight erythema to RUE.   Psychiatric: Good judgement and insight; normal mood and affect.  Neurological: Cranial nerves are grossly intact.  Foot: nails in good condition, no amputations noted.

## 2019-03-04 NOTE — PROGRESS NOTES
"Admit Note:    Met with patient to assess needs. Patient is a 58 y.o.  male, admitted for Cough [R05]  AVILA (acute kidney injury) [N17.9]  Acute kidney injury [N17.9]  Infection of peripherally inserted central venous catheter (PICC), initial encounter [T80.219A]  Sepsis, due to unspecified organism [A41.9]. Pt is s/p lung txp from 8/22/17.      Patient admitted from home on 3/3/2019 .  At this time, patient presents as alert and oriented x 4, pleasant, good eye contact and asking and answering questions appropriately.  At this time, patients caregiver presents as not present in room.    Household/Family Systems     Patient resides with patient's self and mother, at [address] [facility] [address]  [facility] [place] [facility] [address].  Support system includes pt's mother Susy, daughter Sybil, brother Williams, and sister Albina.  Patient does have dependents that are in need of being cared for. Patient's mother is being cared for my pt's brother Woodrow. Pt's mother suffers from "sundowners"      Patients primary caregiver is Sybil Teixeira, patients daughter, phone number [phone].  Confirmed patients contact information is [phone] (home);   Telephone Information:   Mobile [phone]   .  During admission, patient's caregiver plans to stay at home.  Confirmed patient and patients caregivers do have access to reliable transportation.    Cognitive Status/Learning     Patient reports reading ability as 12th grade and states patient does have difficulty with seeing and and wears glasses.  Patient reports patient learns best by Written material/Verbal explanation/Demonstration/ Hands on practice.   Needed: No.   Highest education level: High School (9-12) or GED    Vocation/Disability   .  Working for Income: yes  Pt currently working full time as an .       Patient was disabled due to Sarcoidosis in 2/2017.  Pt resumed working post transplant    Adherence     Patient reports a high level of adherence to " patients health care regimen.  Adherence counseling and education provided. Patient verbalizes understanding.    Substance Use    Patient reports the following substance usage.    Tobacco: none, patient denies any use.  Alcohol: none, patient denies any use.  Illicit Drugs/Non-prescribed Medications: none, patient denies any use.  Patient states clear understanding of the potential impact of substance use.  Substance abstinence/cessation counseling, education and resources provided and reviewed.     Services Utilizing/ADLS    Infusion Service: Prior to admission, patient utilizing? yes pt currently rcving infusions with [facility] Ph# [phone]/Fx# [phone]  Home Health: Prior to admission, patient utilizing? no  DME: Prior to admission, no prior to txp pt had O2 supplies through [facility]  Pulmonary/Cardiac Rehab: Prior to admission, no  Dialysis:  Prior to admission, no  Transplant Specialty Pharmacy:  Prior to admission, no.       [facility] [id number] [place] [address] AT [facility]  [address] [place] [address]  Phone: [phone] Fax: [phone]    [facility] - [address]  [address] [facility] [address]  Phone: [phone] Fax: [phone]      Prior to admission, patient reports patient was independent with ADLS and was driving.  Patient reports patient is able to care for self at this time..  Patient indicates a willingness to care for self once medically cleared to do so.    Insurance/Medications    Insured by   Payor/Plan Subscr  Sex Relation Sub. Ins. ID Effective Group Num   1. AET[employer] - AETNA* JAYE BORDEN* 1960 Male  [id number]                                   [address]   2. AET[employer] - AETNA* MICHIJAYE ROCHA* 1960 Male  [id number] 9/[birth year]/15 [id number]                                   [address]   Primary Insurance (for OS  reporting): Private Insurance  Secondary Insurance (for UNOS reporting): None    Patient reports patient is able to obtain and afford medications at this time and at time of discharge.    Living Will/Healthcare Power of     Patient states patient does not have a LW and/or HCPA.   provided education regarding LW and HCPA and the completion of forms.    Coping/Mental Health    Patient is coping adequately with the aid of  family members.  Patient denies mental health difficulties.     Discharge Planning    At time of discharge, patient plans to return to patient's home under the care of self.  Patient will transport self.  Per rounds today, expected discharge date has not been medically determined at this time. Patient and patients caregiver  verbalize understanding and are involved in treatment planning and discharge process.      Additional Concerns     providing ongoing psychosocial support, education, resources and d/c planning as needed.  SW remains available.  remains available. Patient verbalizes understanding and agreement with information reviewed, social work availability, and how to access available resources as needed.

## 2019-03-04 NOTE — ASSESSMENT & PLAN NOTE
WBC of 21 concerning for possible bacteremia versus cellulitis from PICC line. PICC removed while in ER. Tip sent for culture. Blood cultures x2 drawn in the ED. Procalcitonin significantly elevated at 11.32.       WBC with downtrend to 14.9 today.  Continue IV vancomycin and cefepime while awaiting blood and catheter tip culture results. Treatment dose of Lovenox started for RUE DVT.

## 2019-03-04 NOTE — PLAN OF CARE
Problem: Adult Inpatient Plan of Care  Goal: Plan of Care Review  Outcome: Ongoing (interventions implemented as appropriate)  Pt is AAOx4, denies pain, VSS, and independent. NS infusing continuously at 100 ml/hr. Cefepime and Vanc administered. PRN Calcium gluconate and potassium given as ordered. VISI and telemetry monitoring in place. Blood glucose monitoring performed and treated as ordered. Pt is up in chair, call bell within reach, and nonskid socks on. Will continue to monitor.

## 2019-03-04 NOTE — ASSESSMENT & PLAN NOTE
Secondary to Foscarnet infusions. Previously on Mag Ox 800 mg TID as outpatient; however, mag supplementation could be contributing to his diarrhea. Magnesium chloride supplementation started.     Mg normal at 1.8 today. Mag replacement orders available as needed.

## 2019-03-04 NOTE — PLAN OF CARE
Problem: Adult Inpatient Plan of Care  Goal: Plan of Care Review  Outcome: Ongoing (interventions implemented as appropriate)  Pt AAOx4. Pt free from falls. Pt wears non slip socks when ambulating. Pt bed low and locked position. Pt afebrile. Pt IV site without redness or edema. Educated pt on importance of hand washing. Pt has denied any pain or discomfort this shift. Cdif still pending from lab. 2L nc o2

## 2019-03-04 NOTE — ASSESSMENT & PLAN NOTE
BUE and BLE US overnight revealed a right brachial vein with partially occlusive thrombosis and small amount of nonocclusive thrombus visualized in the basilic vein. BLE US did reveal occlusive thrombosis in the left greater saphenous vein.     Treatment dose of Lovenox started at 100 mg BID.

## 2019-03-04 NOTE — ASSESSMENT & PLAN NOTE
Patient is s/p BLT 18 months ago secondary to sarcoidosis with associated pulmonary hypertension. 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 Last Foscarnet infusion on 3/2 PM dose.      Will hold nephrotoxic immunosuppression of tacrolimus while AVILA resolves. Continue immunosuppression with prednisone 5 mg daily. Oxygenation desats overnight likely due to patient's history of sleep apnea. Patient is not compliant with his home CPAP machine, and is unable to recall the previous settings at this time.     Wean oxygen supplementation throughout the day to room air. Maintain O2 sats > 88%. Encourage ambulation.

## 2019-03-04 NOTE — ASSESSMENT & PLAN NOTE
Current outpatient regimen: tacrolimus 1.5 mg BID and prednisone 5 mg daily.       Will continue to hold tacrolimus in setting of AVILA. Will monitor daily tacrolimus levels while inpatient and monitor for supertherapeutic levels given current AVILA. Continue prednisone 5 mg daily.

## 2019-03-04 NOTE — CONSULTS
[facility]  Endocrinology  Diabetes Consult Note    Consult Requested by: Darrick Wolfe MD   Reason for admit: Cellulitis of right upper extremity    HISTORY OF PRESENT ILLNESS:  Reason for Consult: Management of type 2 DM, Hyperglycemia     Surgical Procedure and Date: 2017 - Bilateral Lung Transplant     Diabetes diagnosis year:      Home Diabetes Medications:   Tresiba 20 units HS and humalog 12 units with meals  Lab Results   Component Value Date    HGBA1C 10.9 (H) 2019        How often checking glucose at home? One   BG readings on regimen: 100s  Hypoglycemia on the regimen?  no  Missed doses on regimen?  Yes about once a day - humalog missed      Diabetes Complications include:     Hyperglycemia     Complicating diabetes co morbidities:   KRISTYN and Glucocorticoid use , A-fib, AVILA, Pancreatitis, Osteopenia        HPI:   Patient is a 58 y.o. male with a diagnosis of type 2 DM on MDI. Also with sarcoidosis (s/p BLT), KRISTYN, osteopenia, and Afib. Patient presented to ED with right upper arm swelling were PICC is located; also with nausea, vomiting, and diarrhea. Endocrinology consulted for BG/ DM management.             Interval HPI:   Overnight events: Admitted to TSU. BG at goal with scheduled insulin. Prednisone 5 mg daily. Creatinine elevated at 3.   Eatin%  Nausea: No  Hypoglycemia and intervention: No  Fever: No  TPN and/or TF: No    PMH, PSH, FH, SH  reviewed     Review of Systems   Constitutional:+ for weight changes.  Eyes: Negative for visual disturbance.  Respiratory: + for cough.   Cardiovascular: Negative for chest pain.  Gastrointestinal: Negative for nausea; decreased appetite  Endocrine: + for polyuria, polydipsia.  Musculoskeletal: Negative for back pain.  Skin: Negative for rash.  Neurological: Negative for syncope.  Psychiatric/Behavioral: Negative for depression.      Current Medications and/or Treatments Impacting Glycemic Control  Immunotherapy:     Immunosuppressants     None        Steroids:   Hormones (From admission, onward)    Start     Stop Route Frequency Ordered    03/04/19 0900  predniSONE tablet 5 mg      -- Oral Daily 03/03/19 1517        Pressors:    Autonomic Drugs (From admission, onward)    None        Hyperglycemia/Diabetes Medications:   Antihyperglycemics (From admission, onward)    Start     Stop Route Frequency Ordered    03/04/19 0715  insulin aspart U-100 pen 12 Units      -- SubQ 3 times daily with meals 03/03/19 1645    03/03/19 2100  insulin detemir U-100 pen 20 Units      -- SubQ Nightly 03/03/19 1645    03/03/19 1745  insulin aspart U-100 pen 0-5 Units      -- SubQ Before meals & nightly PRN 03/03/19 1645             PHYSICAL EXAMINATION:  Vitals:    03/04/19 0830   BP: 131/89   Pulse: 102   Resp: 20   Temp: 97.8 °F (36.6 °C)     Body mass index is 30.68 kg/m².    Physical Exam   Constitutional: Well developed, well nourished, NAD.  ENT: External ears no masses with nose patent; normal hearing.  Neck: Supple; trachea midline.  Cardiovascular: Normal heart sounds, + LE edema. DP +2 bilaterally.  Lungs: Normal effort; lungs anterior bilaterally clear to auscultation.  Abdomen: Soft, no masses, no hernias.  MS: No clubbing or cyanosis of nails noted; unable to assess gait.  Skin: No rashes, lesions, or ulcers; no nodules. Injection sites are ok. No lipo hypertropthy or atrophy. Edema and slight erythema to RUE.   Psychiatric: Good judgement and insight; normal mood and affect.  Neurological: Cranial nerves are grossly intact.  Foot: nails in good condition, no amputations noted.          Labs Reviewed and Include   Recent Labs   Lab 03/04/19  1020      CALCIUM 7.6*   ALBUMIN 3.0*   PROT 7.1      K 3.2*   CO2 29   CL 97   BUN 31*   CREATININE 3.1*   ALKPHOS 52*   ALT 44   AST 67*   BILITOT 0.5     Lab Results   Component Value Date    WBC 14.90 (H) 03/04/2019    HGB 8.8 (L) 03/04/2019    HCT 26.1 (L) 03/04/2019    MCV 89  03/04/2019     03/04/2019     No results for input(s): TSH, FREET4 in the last 168 hours.  Lab Results   Component Value Date    HGBA1C 10.9 (H) 02/12/2019       Nutritional status:   Body mass index is 30.68 kg/m².  Lab Results   Component Value Date    ALBUMIN 3.0 (L) 03/04/2019    ALBUMIN 2.6 (L) 03/04/2019    ALBUMIN 3.0 (L) 03/03/2019     No results found for: PREALBUMIN    Estimated Creatinine Clearance: 32 mL/min (A) (based on SCr of 3.1 mg/dL (H)).    Accu-Checks  Recent Labs     03/03/19  2310 03/04/19  0755 03/04/19  1150   POCTGLUCOSE 164* 132* 102        ASSESSMENT and PLAN    * Cellulitis of right upper extremity    Infection may increase insulin resistance.        Type 2 diabetes mellitus with hyperglycemia, with long-term current use of insulin    BG goal 140-180.     Decrease Levemir 18 units HS; FBG slightly below goal.   Decrease Novolog 10 units with meals; appetite decreased  BG monitoring ac/hs and low dose correction scale.        Acute kidney injury    Avoid insulin stacking and hypoglycemia.  Lab Results   Component Value Date    CREATININE 3.0 (H) 03/04/2019            Immunosuppression    May increase insulin resistance.              Plan discussed with patient at bedside.     Katherin Peng NP  Endocrinology  [facility]

## 2019-03-04 NOTE — SUBJECTIVE & OBJECTIVE
Subjective:     Interval History: Patient with asymptomatic desaturations overnight to the 80s. STAT CXR overnight unremarkable and IVF were d/c. Patient reports this morning that he does have sleep apnea but is non-compliant with CPAP use. Patient continues to report intermittent episodes of diarrhea. No further episodes of nausea or vomiting, but does remain with decreased appetite. C. Diff testing negative.     Will plan to resume IVF at 100cc/hr for 10 hours. Continues to report chronic dry cough. Oxygen weaned to 2L this morning with O2 sats of 98% on time of exam. Continue to wean oxygen on patient to maintain O2 sats > 88%.     Continuous Infusions:   sodium chloride 0.9% 100 mL/hr at 03/04/19 1010     Scheduled Meds:   aspirin  81 mg Oral Daily    ceFEPime (MAXIPIME) IVPB  1 g Intravenous Q12H    enoxaparin  1 mg/kg Subcutaneous Q12H    famotidine  20 mg Oral BID    fluticasone  1 spray Each Nare Daily    folic acid  1,000 mcg Oral Daily    insulin aspart U-100  12 Units Subcutaneous TIDWM    insulin detemir U-100  20 Units Subcutaneous QHS    magnesium chloride  128 mg Oral Daily    potassium chloride SA  20 mEq Oral Daily    pravastatin  20 mg Oral Daily    predniSONE  5 mg Oral Daily    vancomycin (VANCOCIN) IVPB (custom)  1,000 mg Intravenous Q24H     PRN Meds:acetaminophen, calcium gluconate IVPB, calcium gluconate IVPB, calcium gluconate IVPB, dextrose 50%, dextrose 50%, glucagon (human recombinant), glucose, glucose, insulin aspart U-100, magnesium sulfate IVPB **AND** magnesium sulfate IVPB, ondansetron, potassium chloride 10% **AND** potassium chloride 10% **AND** potassium chloride 10%, promethazine (PHENERGAN) IVPB    Review of patient's allergies indicates:  No Known Allergies    Review of Systems   Constitutional: Positive for appetite change, chills and fatigue. Negative for diaphoresis and fever.   HENT: Positive for voice change. Negative for congestion, rhinorrhea, sinus pain  and sore throat.    Eyes: Negative for discharge and redness.   Respiratory: Positive for cough. Negative for shortness of breath and wheezing.    Cardiovascular: Positive for leg swelling. Negative for chest pain.   Gastrointestinal: Positive for diarrhea, nausea and vomiting. Negative for abdominal distention and abdominal pain.   Endocrine: Negative for polydipsia and polyuria.   Genitourinary: Negative for dysuria, frequency and urgency.   Musculoskeletal: Positive for myalgias. Negative for back pain, neck pain and neck stiffness.   Skin: Positive for color change (RUE). Negative for pallor.   Allergic/Immunologic: Positive for immunocompromised state.   Neurological: Negative for dizziness, light-headedness and headaches.   Hematological: Negative for adenopathy. Does not bruise/bleed easily.   Psychiatric/Behavioral: Negative for agitation, confusion and decreased concentration.     Objective:   Physical Exam   Constitutional: He is oriented to person, place, and time. He appears well-developed and well-nourished. No distress.   HENT:   Head: Normocephalic and atraumatic.   Right Ear: External ear normal.   Left Ear: External ear normal.   Nose: Nose normal.   Mouth/Throat: Oropharynx is clear and moist.   Eyes: Conjunctivae and EOM are normal. Right eye exhibits no discharge. Left eye exhibits no discharge.   Neck: Normal range of motion. Neck supple. No JVD present.   Cardiovascular: Normal rate, regular rhythm and normal heart sounds. Exam reveals no gallop and no friction rub.   No murmur heard.  Pulmonary/Chest: Effort normal. He has no wheezes. He has no rhonchi. He has no rales.   Well-healed mid-line sternotomy incision   Abdominal: Soft. Bowel sounds are normal. He exhibits no distension. There is no tenderness.   Musculoskeletal: Normal range of motion. He exhibits edema (RUE with associated erythema; BLE to ankle).   Neurological: He is alert and oriented to person, place, and time.   Skin: Skin is  warm and dry. He is not diaphoretic. There is erythema.   RUE erythema localized to the right hand  Old PICC site superior to the antecubital area with area of 2 cm circular erythema with yellow crusting at point of insertion   Psychiatric: He has a normal mood and affect. His behavior is normal.   Nursing note and vitals reviewed.        Vital Signs (Most Recent):  Temp: 97.8 °F (36.6 °C) (03/04/19 0830)  Pulse: 102 (03/04/19 0830)  Resp: 20 (03/04/19 0830)  BP: 131/89 (03/04/19 0830)  SpO2: 96 % (03/04/19 0830) Vital Signs (24h Range):  Temp:  [97.8 °F (36.6 °C)-98.2 °F (36.8 °C)] 97.8 °F (36.6 °C)  Pulse:  [] 102  Resp:  [14-29] 20  SpO2:  [86 %-97 %] 96 %  BP: (107-131)/(66-89) 131/89     Weight: 99.8 kg (220 lb)  Body mass index is 30.68 kg/m².      Intake/Output Summary (Last 24 hours) at 3/4/2019 1016  Last data filed at 3/4/2019 0500  Gross per 24 hour   Intake 740 ml   Output 650 ml   Net 90 ml       Significant Labs:  CBC:  Recent Labs   Lab 03/04/19 0257   WBC 14.90*   RBC 2.95*   HGB 8.8*   HCT 26.1*      MCV 89   MCH 29.8   MCHC 33.7     BMP:  Recent Labs   Lab 03/04/19 0257      K 2.6*   CL 97   CO2 28   BUN 33*   CREATININE 3.0*   CALCIUM 6.7*      Tacrolimus Levels:  Recent Labs   Lab 03/04/19  0257   TACROLIMUS 11.0     Microbiology:  Microbiology Results (last 7 days)     Procedure Component Value Units Date/Time    IV catheter culture [[id number]] Collected:  03/03/19 1502    Order Status:  Completed Specimen:  Catheter Tip, PICC Updated:  03/04/19 0718     Aerobic Culture - Cath tip No growth    Clostridium difficile EIA [[id number]] Collected:  03/03/19 2106    Order Status:  Completed Specimen:  Stool Updated:  03/04/19 0353     C. diff Antigen Negative     C difficile Toxins A+B, EIA Negative     Comment: Testing not recommended for children <24 months old.       Blood culture #2 **CANNOT BE ORDERED STAT** [[id number]] Collected:  03/03/19 1443    Order Status:  Completed  Specimen:  Blood from Peripheral, Wrist, Left Updated:  03/04/19 0115     Blood Culture, Routine No Growth to date    Blood culture #1 **CANNOT BE ORDERED STAT** [[id number]] Collected:  03/03/19 1352    Order Status:  Completed Specimen:  Blood from Peripheral, Antecubital, Left Updated:  03/03/19 2115     Blood Culture, Routine No Growth to date          I have reviewed all pertinent labs within the past 24 hours.    Diagnostic Results:  Labs: Reviewed  X-Ray: Reviewed  US: Reviewed

## 2019-03-04 NOTE — ASSESSMENT & PLAN NOTE
BG goal 140-180.     Decrease Levemir 18 units HS; FBG slightly below goal.   Decrease Novolog 10 units with meals; appetite decreased  BG monitoring ac/hs and low dose correction scale.

## 2019-03-04 NOTE — CONSULTS
Consult received. Full note to follow. Please call for questions.    Thanks,    Sherry Pacheco DO  Infectious Disease Fellow  P: [phone]

## 2019-03-04 NOTE — ASSESSMENT & PLAN NOTE
Cr noted to increase from 1.6 on 3/1 to 2.8 today. Likely medication induced as patient is currently receiving Foscarnet infusions as well as nephrotoxic tacrolimus for immunosuppression.     1.5L IVF administered while in the ER. IVF started at 100cc/hr overnight; however, infusion was paused due to concern for oxygen desaturations. IVF resumed at 100 cc/hr this morning with plans to complete 10 hour course to achieve 1L of fluids being administered.  Will hold nephrotoxic medications tacrolimus and Bactrim while waiting for resolution for AVILA. Cr with noted increase to 3 today.    Will continue to hold Foscarnet infusions until Cr and electrolyte imbalances improve.

## 2019-03-04 NOTE — PROGRESS NOTES
CRITICAL LAB RESULTS POTASSIUM 2.6 CALCIUM 6.7. WILL REPLACE POTASSIUM WITGH prn ORDERS AND REPEAT LAB AT 10 am   AWAITING CALCIUM ORDERS.NOTIFIED MD LANDAVERDE.

## 2019-03-04 NOTE — NURSING
Reported to FADY Milian pt's ionized CA was decreased, cr was above 1.8, K 3.2, and mg was 1.8. PA approved administering Calcium gluconate 1g IV x 1 and K PO supplement. Will hold PRN Mg IVPB per verbal order- administered new PO supplement. Will continue to monitor.

## 2019-03-04 NOTE — PLAN OF CARE
I was called to evaluate the patient after he was found to have o2 sats in the low 80s during his sleep. He denies any dyspnea and does not feel any worse than he previously did. On exam he does have crackles in the bases R>L. Holding IVFs, CXR ordered. Repeat Lactate ordered as his last check was increased from previous.  Will also get Am labs now and then re-evaluate.     Melody Thao M.D.  [facility] Pulmonary/Critical Care Fellow

## 2019-03-04 NOTE — HPI
Reason for Consult: Management of type 2 DM, Hyperglycemia     Surgical Procedure and Date: 08/27/2017 - Bilateral Lung Transplant     Diabetes diagnosis year: 2017     Home Diabetes Medications:  Tresiba 20 units HS and humalog 12 units with meals  Lab Results   Component Value Date    HGBA1C 10.9 (H) 02/12/2019        How often checking glucose at home? One   BG readings on regimen: 100s  Hypoglycemia on the regimen?  no  Missed doses on regimen?  Yes about once a day - humalog missed      Diabetes Complications include:     Hyperglycemia     Complicating diabetes co morbidities:   KRISTYN and Glucocorticoid use , A-fib, AVILA, Pancreatitis, Osteopenia        HPI:   Patient is a 58 y.o. male with a diagnosis of type 2 DM on MDI. Also with sarcoidosis (s/p BLT), KRISTYN, osteopenia, and Afib. Patient presented to ED with right upper arm swelling were PICC is located; also with nausea, vomiting, and diarrhea. Endocrinology consulted for BG/ DM management.

## 2019-03-05 LAB
ALBUMIN SERPL BCP-MCNC: 2.4 G/DL
ALP SERPL-CCNC: 50 U/L
ALT SERPL W/O P-5'-P-CCNC: 35 U/L
ANION GAP SERPL CALC-SCNC: 8 MMOL/L
AST SERPL-CCNC: 44 U/L
BASOPHILS # BLD AUTO: 0.07 K/UL
BASOPHILS NFR BLD: 0.7 %
BILIRUB SERPL-MCNC: 0.4 MG/DL
BUN SERPL-MCNC: 28 MG/DL
CA-I BLDV-SCNC: 0.97 MMOL/L
CALCIUM SERPL-MCNC: 7.5 MG/DL
CHLORIDE SERPL-SCNC: 108 MMOL/L
CMV DNA SERPL NAA+PROBE-ACNC: 64 IU/ML
CO2 SERPL-SCNC: 26 MMOL/L
CREAT SERPL-MCNC: 2.5 MG/DL
DIFFERENTIAL METHOD: ABNORMAL
EOSINOPHIL # BLD AUTO: 0.3 K/UL
EOSINOPHIL NFR BLD: 3.3 %
ERYTHROCYTE [DISTWIDTH] IN BLOOD BY AUTOMATED COUNT: 14.8 %
EST. GFR  (AFRICAN AMERICAN): 32 ML/MIN/1.73 M^2
EST. GFR  (NON AFRICAN AMERICAN): 27.7 ML/MIN/1.73 M^2
GLUCOSE SERPL-MCNC: 81 MG/DL
HCT VFR BLD AUTO: 26.5 %
HGB BLD-MCNC: 8.5 G/DL
IMM GRANULOCYTES # BLD AUTO: 0.45 K/UL
IMM GRANULOCYTES NFR BLD AUTO: 4.6 %
LYMPHOCYTES # BLD AUTO: 0.7 K/UL
LYMPHOCYTES NFR BLD: 7.6 %
MAGNESIUM SERPL-MCNC: 1.6 MG/DL
MCH RBC QN AUTO: 29.8 PG
MCHC RBC AUTO-ENTMCNC: 32.1 G/DL
MCV RBC AUTO: 93 FL
MONOCYTES # BLD AUTO: 0.5 K/UL
MONOCYTES NFR BLD: 5 %
NEUTROPHILS # BLD AUTO: 7.6 K/UL
NEUTROPHILS NFR BLD: 78.8 %
NRBC BLD-RTO: 0 /100 WBC
PHOSPHATE SERPL-MCNC: <1 MG/DL
PLATELET # BLD AUTO: 370 K/UL
PMV BLD AUTO: 8.9 FL
POCT GLUCOSE: 105 MG/DL (ref 70–110)
POCT GLUCOSE: 129 MG/DL (ref 70–110)
POCT GLUCOSE: 154 MG/DL (ref 70–110)
POCT GLUCOSE: 182 MG/DL (ref 70–110)
POTASSIUM SERPL-SCNC: 3.1 MMOL/L
PROT SERPL-MCNC: 5.7 G/DL
RBC # BLD AUTO: 2.85 M/UL
SODIUM SERPL-SCNC: 142 MMOL/L
TACROLIMUS BLD-MCNC: 6.4 NG/ML
VANCOMYCIN TROUGH SERPL-MCNC: 16.3 UG/ML
WBC # BLD AUTO: 9.68 K/UL

## 2019-03-05 PROCEDURE — 99232 PR SUBSEQUENT HOSPITAL CARE,LEVL II: ICD-10-PCS | Mod: ,,, | Performed by: INTERNAL MEDICINE

## 2019-03-05 PROCEDURE — 80202 ASSAY OF VANCOMYCIN: CPT

## 2019-03-05 PROCEDURE — 63600175 PHARM REV CODE 636 W HCPCS: Performed by: PHYSICIAN ASSISTANT

## 2019-03-05 PROCEDURE — 99232 PR SUBSEQUENT HOSPITAL CARE,LEVL II: ICD-10-PCS | Mod: ,,, | Performed by: NURSE PRACTITIONER

## 2019-03-05 PROCEDURE — 83735 ASSAY OF MAGNESIUM: CPT

## 2019-03-05 PROCEDURE — 25000003 PHARM REV CODE 250: Performed by: PHYSICIAN ASSISTANT

## 2019-03-05 PROCEDURE — 20600001 HC STEP DOWN PRIVATE ROOM

## 2019-03-05 PROCEDURE — 80053 COMPREHEN METABOLIC PANEL: CPT

## 2019-03-05 PROCEDURE — 36415 COLL VENOUS BLD VENIPUNCTURE: CPT

## 2019-03-05 PROCEDURE — 25000003 PHARM REV CODE 250: Performed by: INTERNAL MEDICINE

## 2019-03-05 PROCEDURE — 84100 ASSAY OF PHOSPHORUS: CPT

## 2019-03-05 PROCEDURE — 99232 SBSQ HOSP IP/OBS MODERATE 35: CPT | Mod: ,,, | Performed by: INTERNAL MEDICINE

## 2019-03-05 PROCEDURE — 99232 SBSQ HOSP IP/OBS MODERATE 35: CPT | Mod: ,,, | Performed by: NURSE PRACTITIONER

## 2019-03-05 PROCEDURE — 85025 COMPLETE CBC W/AUTO DIFF WBC: CPT

## 2019-03-05 PROCEDURE — 82330 ASSAY OF CALCIUM: CPT

## 2019-03-05 PROCEDURE — 80197 ASSAY OF TACROLIMUS: CPT

## 2019-03-05 PROCEDURE — 63600175 PHARM REV CODE 636 W HCPCS: Performed by: NURSE PRACTITIONER

## 2019-03-05 RX ORDER — AMLODIPINE BESYLATE 5 MG/1
5 TABLET ORAL DAILY
Status: DISCONTINUED | OUTPATIENT
Start: 2019-03-05 | End: 2019-03-14 | Stop reason: HOSPADM

## 2019-03-05 RX ORDER — SODIUM CHLORIDE 9 MG/ML
INJECTION, SOLUTION INTRAVENOUS CONTINUOUS
Status: ACTIVE | OUTPATIENT
Start: 2019-03-05 | End: 2019-03-06

## 2019-03-05 RX ADMIN — CEFEPIME 1 G: 1 INJECTION, POWDER, FOR SOLUTION INTRAMUSCULAR; INTRAVENOUS at 05:03

## 2019-03-05 RX ADMIN — MAGNESIUM 64 MG (MAGNESIUM CHLORIDE) TABLET,DELAYED RELEASE 128 MG: at 08:03

## 2019-03-05 RX ADMIN — VANCOMYCIN HYDROCHLORIDE 1000 MG: 1 INJECTION, POWDER, LYOPHILIZED, FOR SOLUTION INTRAVENOUS at 12:03

## 2019-03-05 RX ADMIN — INSULIN ASPART 10 UNITS: 100 INJECTION, SOLUTION INTRAVENOUS; SUBCUTANEOUS at 04:03

## 2019-03-05 RX ADMIN — SODIUM CHLORIDE: 0.9 INJECTION, SOLUTION INTRAVENOUS at 04:03

## 2019-03-05 RX ADMIN — INSULIN ASPART 10 UNITS: 100 INJECTION, SOLUTION INTRAVENOUS; SUBCUTANEOUS at 12:03

## 2019-03-05 RX ADMIN — INSULIN ASPART 10 UNITS: 100 INJECTION, SOLUTION INTRAVENOUS; SUBCUTANEOUS at 09:03

## 2019-03-05 RX ADMIN — POTASSIUM CHLORIDE 20 MEQ: 1500 TABLET, EXTENDED RELEASE ORAL at 08:03

## 2019-03-05 RX ADMIN — FAMOTIDINE 20 MG: 20 TABLET ORAL at 08:03

## 2019-03-05 RX ADMIN — PREDNISONE 5 MG: 5 TABLET ORAL at 08:03

## 2019-03-05 RX ADMIN — ASPIRIN 81 MG: 81 TABLET, COATED ORAL at 08:03

## 2019-03-05 RX ADMIN — POTASSIUM PHOSPHATE, MONOBASIC AND POTASSIUM PHOSPHATE, DIBASIC 15 MMOL: 224; 236 INJECTION, SOLUTION, CONCENTRATE INTRAVENOUS at 10:03

## 2019-03-05 RX ADMIN — FOLIC ACID 1000 MCG: 1 TABLET ORAL at 08:03

## 2019-03-05 RX ADMIN — PRAVASTATIN SODIUM 20 MG: 20 TABLET ORAL at 08:03

## 2019-03-05 RX ADMIN — AMLODIPINE BESYLATE 5 MG: 5 TABLET ORAL at 08:03

## 2019-03-05 RX ADMIN — APIXABAN 2.5 MG: 2.5 TABLET, FILM COATED ORAL at 09:03

## 2019-03-05 NOTE — SUBJECTIVE & OBJECTIVE
Subjective:     Interval History: No acute events overnight. Patient reports that diarrhea has lessened and that BMs are becoming more formed. Remains with chronic dry cough but denies any SOB. Remains with significant RUE edema.  Currently 92-95% on RA. Electrolytes being replaced daily per orders. Creatinine with downtrend from yesterday. Will plan for IVF at 100cc/hr for 10 hours today.  Encouraged ambulation.     Continuous Infusions:   sodium chloride 0.9%       Scheduled Meds:   amLODIPine  5 mg Oral Daily    apixaban  2.5 mg Oral BID    aspirin  81 mg Oral Daily    ceFEPime (MAXIPIME) IVPB  1 g Intravenous Q12H    famotidine  20 mg Oral BID    fluticasone  1 spray Each Nare Daily    folic acid  1,000 mcg Oral Daily    insulin aspart U-100  10 Units Subcutaneous TIDWM    insulin detemir U-100  16 Units Subcutaneous QHS    magnesium chloride  128 mg Oral Daily    potassium chloride SA  20 mEq Oral Daily    potassium phosphate IVPB  15 mmol Intravenous Once    pravastatin  20 mg Oral Daily    predniSONE  5 mg Oral Daily    vancomycin (VANCOCIN) IVPB (custom)  1,000 mg Intravenous Q24H     PRN Meds:acetaminophen, calcium gluconate IVPB, calcium gluconate IVPB, calcium gluconate IVPB, dextrose 50%, dextrose 50%, glucagon (human recombinant), glucose, glucose, insulin aspart U-100, magnesium sulfate IVPB **AND** magnesium sulfate IVPB, ondansetron, potassium chloride 10% **AND** potassium chloride 10% **AND** potassium chloride 10%, promethazine (PHENERGAN) IVPB    Review of patient's allergies indicates:  No Known Allergies    Review of Systems   Constitutional: Positive for appetite change, chills and fatigue. Negative for diaphoresis and fever.   HENT: Positive for voice change. Negative for congestion, rhinorrhea, sinus pain and sore throat.    Eyes: Negative for discharge and redness.   Respiratory: Positive for cough. Negative for shortness of breath and wheezing.    Cardiovascular: Positive  for leg swelling. Negative for chest pain.   Gastrointestinal: Positive for diarrhea, nausea and vomiting. Negative for abdominal distention and abdominal pain.   Endocrine: Negative for polydipsia and polyuria.   Genitourinary: Negative for dysuria, frequency and urgency.   Musculoskeletal: Positive for myalgias. Negative for back pain, neck pain and neck stiffness.   Skin: Positive for color change (RUE). Negative for pallor.   Allergic/Immunologic: Positive for immunocompromised state.   Neurological: Negative for dizziness, light-headedness and headaches.   Hematological: Negative for adenopathy. Does not bruise/bleed easily.   Psychiatric/Behavioral: Negative for agitation, confusion and decreased concentration.     Objective:   Physical Exam   Constitutional: He is oriented to person, place, and time. He appears well-developed and well-nourished. No distress.   HENT:   Head: Normocephalic and atraumatic.   Right Ear: External ear normal.   Left Ear: External ear normal.   Nose: Nose normal.   Mouth/Throat: Oropharynx is clear and moist.   Eyes: Conjunctivae and EOM are normal. Right eye exhibits no discharge. Left eye exhibits no discharge.   Neck: Normal range of motion. Neck supple. No JVD present.   Cardiovascular: Normal rate, regular rhythm and normal heart sounds. Exam reveals no gallop and no friction rub.   No murmur heard.  Pulmonary/Chest: Effort normal. He has no wheezes. He has no rhonchi. He has no rales.   Well-healed mid-line sternotomy incision   Abdominal: Soft. Bowel sounds are normal. He exhibits no distension. There is no tenderness.   Musculoskeletal: Normal range of motion. He exhibits edema (RUE with associated erythema; BLE to ankle).   Neurological: He is alert and oriented to person, place, and time.   Skin: Skin is warm and dry. He is not diaphoretic. There is erythema.   RUE erythema localized to the right hand  Old PICC site superior to the antecubital area with area of 2 cm  circular erythema with yellow crusting at point of insertion   Psychiatric: He has a normal mood and affect. His behavior is normal.   Nursing note and vitals reviewed.        Vital Signs (Most Recent):  Temp: 98.2 °F (36.8 °C) (03/05/19 1237)  Pulse: 93 (03/05/19 1216)  Resp: (!) 22 (03/05/19 1216)  BP: 131/83 (03/05/19 1216)  SpO2: 95 % (03/05/19 1216) Vital Signs (24h Range):  Temp:  [97.9 °F (36.6 °C)-98.2 °F (36.8 °C)] 98.2 °F (36.8 °C)  Pulse:  [85-98] 93  Resp:  [17-30] 22  SpO2:  [88 %-97 %] 95 %  BP: (131-156)/() 131/83     Weight: 99.9 kg (220 lb 3.8 oz)  Body mass index is 30.72 kg/m².      Intake/Output Summary (Last 24 hours) at 3/5/2019 1331  Last data filed at 3/5/2019 0600  Gross per 24 hour   Intake 700 ml   Output 2400 ml   Net -1700 ml       Significant Labs:  CBC:  Recent Labs   Lab 03/05/19  0635   WBC 9.68   RBC 2.85*   HGB 8.5*   HCT 26.5*   *   MCV 93   MCH 29.8   MCHC 32.1     BMP:  Recent Labs   Lab 03/05/19  0635      K 3.1*      CO2 26   BUN 28*   CREATININE 2.5*   CALCIUM 7.5*      Tacrolimus Levels:  Recent Labs   Lab 03/05/19  0635   TACROLIMUS 6.4     Microbiology:  Microbiology Results (last 7 days)     Procedure Component Value Units Date/Time    Blood culture #1 **CANNOT BE ORDERED STAT** [[id number]] Collected:  03/03/19 1352    Order Status:  Completed Specimen:  Blood from Peripheral, Antecubital, Left Updated:  03/04/19 1612     Blood Culture, Routine No Growth to date     Blood Culture, Routine No Growth to date    Blood culture #2 **CANNOT BE ORDERED STAT** [[id number]] Collected:  03/03/19 1443    Order Status:  Completed Specimen:  Blood from Peripheral, Wrist, Left Updated:  03/04/19 1612     Blood Culture, Routine No Growth to date     Blood Culture, Routine No Growth to date    IV catheter culture [[id number]] Collected:  03/03/19 1502    Order Status:  Completed Specimen:  Catheter Tip, PICC Updated:  03/04/19 0718     Aerobic Culture - Cath tip No  growth    Clostridium difficile EIA [[id number]] Collected:  03/03/19 2106    Order Status:  Completed Specimen:  Stool Updated:  03/04/19 0332     C. diff Antigen Negative     C difficile Toxins A+B, EIA Negative     Comment: Testing not recommended for children <24 months old.             I have reviewed all pertinent labs within the past 24 hours.    Diagnostic Results:  Labs: Reviewed  X-Ray: Reviewed  US: Reviewed

## 2019-03-05 NOTE — PLAN OF CARE
Problem: Adult Inpatient Plan of Care  Goal: Plan of Care Review  Outcome: Ongoing (interventions implemented as appropriate)  Pt AAOx4, VSS, afebrile. Blood cx NGTD, cont Vanc/cefepime while awaiting blood & catheter tip cx results. If nothing grows in cultures, tentatively planning on treating with 7 days of IV vancomycin for presumed picc line infection, but symptoms may have all been related to DVT (per ID). CMV PCR pending. NS @ 100 for 10H infusing, to stop infusion at 0300. Replaced Potassium/Phos IVPB. Eliquis 5mg BID started today. BG ACHS, no SSI coverage needed.Detemir decreased from 18U to 16U. Still significant swelling & soreness to RUE. Bed in low/locked position, call light/personal belongings within reach, non-slip socks on when OOB, WCTM.

## 2019-03-05 NOTE — ASSESSMENT & PLAN NOTE
BG goal 140-180.     Decrease Levemir to 16 units q HS - due to FBG below goal this am  Novolog 10 units with meals  Low dose correction scale - given kidney function  BG monitoring AC/HS    Discharge planning: TBD, likely resume home regimen and follow up on an outpatient basis

## 2019-03-05 NOTE — PROGRESS NOTES
[facility]  Lung Transplant  Progress Note - Floor    Patient Name: Martin Hendrix Jr.  MRN: [id number]  Admission Date: 3/3/2019  Hospital Length of Stay: 2 days  Post-Operative Day: 560  Attending Physician: Samantha Bill DO  Primary Care Provider: Sukhi Dunham Jr, MD     Subjective:     Interval History: No acute events overnight. Patient reports that diarrhea has lessened and that BMs are becoming more formed. Remains with chronic dry cough but denies any SOB. Remains with significant RUE edema.  Currently 92-95% on RA. Electrolytes being replaced daily per orders. Creatinine with downtrend from yesterday. Will plan for IVF at 100cc/hr for 10 hours today.  Encouraged ambulation.     Continuous Infusions:   sodium chloride 0.9%       Scheduled Meds:   amLODIPine  5 mg Oral Daily    apixaban  2.5 mg Oral BID    aspirin  81 mg Oral Daily    ceFEPime (MAXIPIME) IVPB  1 g Intravenous Q12H    famotidine  20 mg Oral BID    fluticasone  1 spray Each Nare Daily    folic acid  1,000 mcg Oral Daily    insulin aspart U-100  10 Units Subcutaneous TIDWM    insulin detemir U-100  16 Units Subcutaneous QHS    magnesium chloride  128 mg Oral Daily    potassium chloride SA  20 mEq Oral Daily    potassium phosphate IVPB  15 mmol Intravenous Once    pravastatin  20 mg Oral Daily    predniSONE  5 mg Oral Daily    vancomycin (VANCOCIN) IVPB (custom)  1,000 mg Intravenous Q24H     PRN Meds:acetaminophen, calcium gluconate IVPB, calcium gluconate IVPB, calcium gluconate IVPB, dextrose 50%, dextrose 50%, glucagon (human recombinant), glucose, glucose, insulin aspart U-100, magnesium sulfate IVPB **AND** magnesium sulfate IVPB, ondansetron, potassium chloride 10% **AND** potassium chloride 10% **AND** potassium chloride 10%, promethazine (PHENERGAN) IVPB    Review of patient's allergies indicates:  No Known Allergies    Review of Systems   Constitutional: Positive for appetite change, chills  and fatigue. Negative for diaphoresis and fever.   HENT: Positive for voice change. Negative for congestion, rhinorrhea, sinus pain and sore throat.    Eyes: Negative for discharge and redness.   Respiratory: Positive for cough. Negative for shortness of breath and wheezing.    Cardiovascular: Positive for leg swelling. Negative for chest pain.   Gastrointestinal: Positive for diarrhea, nausea and vomiting. Negative for abdominal distention and abdominal pain.   Endocrine: Negative for polydipsia and polyuria.   Genitourinary: Negative for dysuria, frequency and urgency.   Musculoskeletal: Positive for myalgias. Negative for back pain, neck pain and neck stiffness.   Skin: Positive for color change (RUE). Negative for pallor.   Allergic/Immunologic: Positive for immunocompromised state.   Neurological: Negative for dizziness, light-headedness and headaches.   Hematological: Negative for adenopathy. Does not bruise/bleed easily.   Psychiatric/Behavioral: Negative for agitation, confusion and decreased concentration.     Objective:   Physical Exam   Constitutional: He is oriented to person, place, and time. He appears well-developed and well-nourished. No distress.   HENT:   Head: Normocephalic and atraumatic.   Right Ear: External ear normal.   Left Ear: External ear normal.   Nose: Nose normal.   Mouth/Throat: Oropharynx is clear and moist.   Eyes: Conjunctivae and EOM are normal. Right eye exhibits no discharge. Left eye exhibits no discharge.   Neck: Normal range of motion. Neck supple. No JVD present.   Cardiovascular: Normal rate, regular rhythm and normal heart sounds. Exam reveals no gallop and no friction rub.   No murmur heard.  Pulmonary/Chest: Effort normal. He has no wheezes. He has no rhonchi. He has no rales.   Well-healed mid-line sternotomy incision   Abdominal: Soft. Bowel sounds are normal. He exhibits no distension. There is no tenderness.   Musculoskeletal: Normal range of motion. He exhibits  edema (RUE with associated erythema; BLE to ankle).   Neurological: He is alert and oriented to person, place, and time.   Skin: Skin is warm and dry. He is not diaphoretic. There is erythema.   RUE erythema localized to the right hand  Old PICC site superior to the antecubital area with area of 2 cm circular erythema with yellow crusting at point of insertion   Psychiatric: He has a normal mood and affect. His behavior is normal.   Nursing note and vitals reviewed.        Vital Signs (Most Recent):  Temp: 98.2 °F (36.8 °C) (03/05/19 1237)  Pulse: 93 (03/05/19 1216)  Resp: (!) 22 (03/05/19 1216)  BP: 131/83 (03/05/19 1216)  SpO2: 95 % (03/05/19 1216) Vital Signs (24h Range):  Temp:  [97.9 °F (36.6 °C)-98.2 °F (36.8 °C)] 98.2 °F (36.8 °C)  Pulse:  [85-98] 93  Resp:  [17-30] 22  SpO2:  [88 %-97 %] 95 %  BP: (131-156)/() 131/83     Weight: 99.9 kg (220 lb 3.8 oz)  Body mass index is 30.72 kg/m².      Intake/Output Summary (Last 24 hours) at 3/5/2019 1331  Last data filed at 3/5/2019 0600  Gross per 24 hour   Intake 700 ml   Output 2400 ml   Net -1700 ml       Significant Labs:  CBC:  Recent Labs   Lab 03/05/19  0635   WBC 9.68   RBC 2.85*   HGB 8.5*   HCT 26.5*   *   MCV 93   MCH 29.8   MCHC 32.1     BMP:  Recent Labs   Lab 03/05/19  0635      K 3.1*      CO2 26   BUN 28*   CREATININE 2.5*   CALCIUM 7.5*      Tacrolimus Levels:  Recent Labs   Lab 03/05/19  0635   TACROLIMUS 6.4     Microbiology:  Microbiology Results (last 7 days)     Procedure Component Value Units Date/Time    Blood culture #1 **CANNOT BE ORDERED STAT** [[id number]] Collected:  03/03/19 1352    Order Status:  Completed Specimen:  Blood from Peripheral, Antecubital, Left Updated:  03/04/19 1612     Blood Culture, Routine No Growth to date     Blood Culture, Routine No Growth to date    Blood culture #2 **CANNOT BE ORDERED STAT** [[id number]] Collected:  03/03/19 1443    Order Status:  Completed Specimen:  Blood from Peripheral,  Wrist, Left Updated:  03/04/19 1612     Blood Culture, Routine No Growth to date     Blood Culture, Routine No Growth to date    IV catheter culture [[id number]] Collected:  03/03/19 1502    Order Status:  Completed Specimen:  Catheter Tip, PICC Updated:  03/04/19 0718     Aerobic Culture - Cath tip No growth    Clostridium difficile EIA [[id number]] Collected:  03/03/19 2106    Order Status:  Completed Specimen:  Stool Updated:  03/04/19 0353     C. diff Antigen Negative     C difficile Toxins A+B, EIA Negative     Comment: Testing not recommended for children <24 months old.             I have reviewed all pertinent labs within the past 24 hours.    Diagnostic Results:  Labs: Reviewed  X-Ray: Reviewed  US: Reviewed      Assessment/Plan:     * Cellulitis of right upper extremity    WBC of 21 concerning for possible bacteremia versus cellulitis from PICC line. PICC removed while in ER. Tip sent for culture. Blood cultures x2 drawn in the ED. Procalcitonin significantly elevated at 11.32.       Leukocytosis has resolved with WBC of 9.6 today. Will continue IV vancomycin and cefepime while awaiting blood and catheter tip culture results.          Acute kidney injury    Cr noted to increase from 1.6 on 3/1 to 2.8 today. Likely medication induced as patient is currently receiving Foscarnet infusions as well as nephrotoxic tacrolimus for immunosuppression.     1.5L IVF administered while in the ER. Received 1000 cc bolus over 10 hours on 3/4 and 3/5. Will hold nephrotoxic medications tacrolimus and Bactrim while waiting for resolution for AVILA. Cr with downtrend to 2.5 today.    Will continue to hold Foscarnet infusions until Cr and electrolyte imbalances improve.      Lung replaced by transplant    Patient is s/p BLT 18 months ago secondary to sarcoidosis with associated pulmonary hypertension. Transplant history complicated by left vocal cord dysfunction, A2 rejection X2 10/17 s/p pulse dose steroids, and A2 rejection  03/2018 s/p thymoglobulin x3 doses. 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 Last Foscarnet infusion on 3/2 PM dose.      Will hold nephrotoxic immunosuppression of tacrolimus while AVILA resolves. Continue immunosuppression with prednisone 5 mg daily. Oxygenation desats overnight likely due to patient's history of sleep apnea. Patient is not compliant with his home CPAP machine, and is unable to recall the previous settings at this time.     Maintain O2 sats > 88% throughout the day. Encourage ambulation.      Immunosuppression    Current outpatient regimen: tacrolimus 1.5 mg BID and prednisone 5 mg daily.       Will continue to hold tacrolimus in setting of AVILA. Will monitor daily tacrolimus levels while inpatient and monitor for supertherapeutic levels given current AVILA. Continue prednisone 5 mg daily.     Prophylactic antibiotic    Will hold Bactrim due to nephrotoxic side effects.      Cytomegalovirus (CMV) viremia    Started on Foscarnet IV infusions BID on 2/13/2019 and has been closely followed by ID, Dr. Lowry.     ID consulted, appreciate recs. Most recent CMV PCR collected upon admission on 3/3 and is in process.    Will continue to hold Foscarnet infusions for now while awaiting resolution of AVILA and electrolyte imbalances.      Hypokalemia    Secondary to Foscarnet therapy. Will continue with KCl 20 mEq daily.  Continue cardiac monitoring. Electrolyte replacement orders available as needed.        Hypomagnesemia    Secondary to Foscarnet infusions. Previously on Mag Ox 800 mg TID as outpatient; however, mag supplementation could be contributing to his diarrhea. Magnesium chloride supplementation started.     Mg normal at 1.6 today. Mag replacement orders available as needed.      Type 2 diabetes mellitus with hyperglycemia, with long-term current use of insulin    Endocrine consulted, appreciate  recs.     Acute deep vein thrombosis (DVT)    BUE and BLE US overnight revealed a right brachial vein with partially occlusive thrombosis and small amount of nonocclusive thrombus visualized in the basilic vein. BLE US did reveal occlusive thrombosis in the left greater saphenous vein.     Treatment dose of Lovenox started at 100 mg BID on 3/4. Apixaban 5 mg BID started on 3/5.          Patricia Fowler PA-C  Lung Transplant  [facility]

## 2019-03-05 NOTE — ASSESSMENT & PLAN NOTE
Caution with insulin stacking  Estimated Creatinine Clearance: 39.7 mL/min (A) (based on SCr of 2.5 mg/dL (H)).

## 2019-03-05 NOTE — ASSESSMENT & PLAN NOTE
Started on Foscarnet IV infusions BID on 2/13/2019 and has been closely followed by ID, Dr. Lowry.     ID consulted, appreciate recs. Most recent CMV PCR collected upon admission on 3/3 and is in process.    Will continue to hold Foscarnet infusions for now while awaiting resolution of AVILA and electrolyte imbalances.

## 2019-03-05 NOTE — HPI
58M PMH BOLT (CMV D+/R-), ACR w/ pulse steroids and thymo, dx w/ CMV reactivation in 8/2018 UL97 mutation on foscarnet who presented w/ possible PICC line infection w/ pain and edema in R arm. He endorsed possible drainage from PICC site, described it as pus. He has been feeling more fatigued lately. Noted to have AVILA on admission, foscarnet is currently being held. Last CMV quant was in the 300s. PICC line has been removed, all cultures are pending. Patient endorsed some diarrhea, c. Diff negative.

## 2019-03-05 NOTE — PLAN OF CARE
Covering SW;    Received call from Pamela discharge planner nurse for [employer] insurance contact # [phone], left contact info for assistance w/dc planning needs.    Anitha Kam LMSW

## 2019-03-05 NOTE — ASSESSMENT & PLAN NOTE
Secondary to Foscarnet infusions. Previously on Mag Ox 800 mg TID as outpatient; however, mag supplementation could be contributing to his diarrhea. Magnesium chloride supplementation started.     Mg normal at 1.6 today. Mag replacement orders available as needed.

## 2019-03-05 NOTE — ASSESSMENT & PLAN NOTE
Patient is s/p BLT 18 months ago secondary to sarcoidosis with associated pulmonary hypertension. 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 Last Foscarnet infusion on 3/2 PM dose.      Will hold nephrotoxic immunosuppression of tacrolimus while AVILA resolves. Continue immunosuppression with prednisone 5 mg daily. Oxygenation desats overnight likely due to patient's history of sleep apnea. Patient is not compliant with his home CPAP machine, and is unable to recall the previous settings at this time.     Maintain O2 sats > 88% throughout the day. Encourage ambulation.

## 2019-03-05 NOTE — ASSESSMENT & PLAN NOTE
Cr noted to increase from 1.6 on 3/1 to 2.8 today. Likely medication induced as patient is currently receiving Foscarnet infusions as well as nephrotoxic tacrolimus for immunosuppression.     1.5L IVF administered while in the ER. Received 1000 cc bolus over 10 hours on 3/4 and 3/5. Will hold nephrotoxic medications tacrolimus and Bactrim while waiting for resolution for AVILA. Cr with downtrend to 2.5 today.    Will continue to hold Foscarnet infusions until Cr and electrolyte imbalances improve.

## 2019-03-05 NOTE — ASSESSMENT & PLAN NOTE
BUE and BLE US overnight revealed a right brachial vein with partially occlusive thrombosis and small amount of nonocclusive thrombus visualized in the basilic vein. BLE US did reveal occlusive thrombosis in the left greater saphenous vein.     Treatment dose of Lovenox started at 100 mg BID on 3/4. Apixaban 5 mg BID started on 3/5.

## 2019-03-05 NOTE — ASSESSMENT & PLAN NOTE
WBC of 21 concerning for possible bacteremia versus cellulitis from PICC line. PICC removed while in ER. Tip sent for culture. Blood cultures x2 drawn in the ED. Procalcitonin significantly elevated at 11.32.       Leukocytosis has resolved with WBC of 9.6 today. Will continue IV vancomycin and cefepime while awaiting blood and catheter tip culture results.

## 2019-03-05 NOTE — PLAN OF CARE
Brief id note:    Pt admitted with LUE edema and swelling, no signs of systemic infection, but some purulence noted at picc line site when it was removed. Dx with DVT. BCx NGTD on vanc/cefepime. If nothing grows in cultures, tentatively planning on treating with 7 days of IV vancomycin for presumed picc line infection, but symptoms may have all been related to DVT. Awaiting CMV PCR; holding foscarnet for now given AVILA.     Nayla Forde MD

## 2019-03-05 NOTE — CONSULTS
[facility]  Infectious Disease  Consult Note    Patient Name: Martin Hendrix Jr.  MRN: [id number]  Admission Date: 3/3/2019  Hospital Length of Stay: 1 days  Attending Physician: Darrick Wolfe MD  Primary Care Provider: Sukhi Dunham Jr, MD     Isolation Status: No active isolations    Patient information was obtained from patient and ER records.      Consults  Assessment/Plan:     * Cellulitis of right upper extremity    58M PMH BOLT, CMV reactivation who presented w/ R arm swelling associated w/ PICC line    Recommendations:  - continue renally dosed vanc and cefepime while awaiting blood and PICC tip cultures  - agree w/ holding foscarnet, follow weekly CMV quant given current AVILA    ID will follow         Thank you for your consult. I will follow-up with patient. Please contact us if you have any additional questions.    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 He endorsed possible drainage from PICC site, described it as pus. He has been feeling more fatigued lately. Noted to have AVILA on admission, foscarnet is currently being held. Last CMV quant was in the 300s. PICC line has been removed, all cultures are pending. Patient endorsed some diarrhea, c. Diff negative.     Past Medical History:   Diagnosis Date    AVILA (acute kidney injury) 8/27/2017    Atrial fibrillation 8/23/2017    On home oxygen therapy     KRISTYN on CPAP     Osteopenia     Pancreatitis 2009    hospitalized    Pulmonary hypertension     Pure hypercholesterolemia 11/15/2017    Sarcoidosis     Shortness of breath     Type 2 diabetes mellitus 11/5/2017       Past Surgical History:   Procedure Laterality Date     ABDOMINAL SURGERY      6 weeks old    BRONCHOSCOPY      CHEST TUBE INSERTION      CHOLECYSTECTOMY      COLONOSCOPY      EGD (ESOPHAGOGASTRODUODENOSCOPY) N/A 8/6/2018    Performed by Ramu Eisenberg MD at [facility] (2ND FLR)    flexible bronchoscopy with tissue biopsy N/A 3/21/2018    Performed by [facility] Provider at [facility] OR 2ND FLR    flexible bronchoscopy with tissue biopsy CPT 36321 N/A 8/22/2018    Performed by [facility] Provider at [facility] OR 2ND FLR    flexible bronchoscopy with tissue biopsy CPT 48868 N/A 4/19/2018    Performed by [facility] Provider at [facility] OR 2ND FLR    flexible bronchoscopy with tissue biopsy CPT 44210 N/A 2/21/2018    Performed by [facility] Provider at [facility] OR 2ND FLR    flexible bronchoscopy with tissue biopsy CPT 72025 N/A 12/20/2017    Performed by [facility] Provider at [facility] OR 2ND FLR    flexible bronchoscopy with tissue biopsy CPT 47865 N/A 11/22/2017    Performed by [facility] Provider at [facility] OR 2ND FLR    flexible bronchoscopy with tissue biopsy CPT 64975 N/A 11/2/2017    Performed by [facility] Provider at [facility] OR 2ND FLR    flexible bronchoscopy with tissue biopsy CPT 43047 N/A 10/4/2017    Performed by [facility] Provider at [facility] OR 2ND FLR    flexible bronchoscopy with tissue biopy CPT 45599 N/A 11/20/2018    Performed by [facility] Provider at [facility] OR [address] [place] [address]    HEART CATH-RIGHT Right 5/22/2017    Performed by Shanell Higuera MD at [facility] CATH LAB    LUNG BIOPSY      LUNG TRANSPLANT  08/2017    PH MONITORING, ESOPHAGUS, WIRELESS, (OFF REFLUX MEDS) N/A 8/6/2018    Performed by Ramu Eisenberg MD at [facility] (2ND FLR)    TRANSPLANT-LUNG Bilateral 8/22/2017    Performed by Paulo German MD at [facility] OR [address] [place] [address]       Review of patient's allergies indicates:  No Known Allergies    Medications:  Medications Prior to Admission   Medication Sig    amLODIPine (NORVASC) 5 MG tablet Take 1 tablet (5 mg total) by  "mouth once daily.    CALCIUM 600 + D,3, 600 mg(1,500mg) -200 unit Tab TAKE 1 TABLET BY MOUTH TWICE DAILY    ECOTRIN LOW STRENGTH 81 mg EC tablet TAKE 1 TABLET DAILY    famotidine (PEPCID) 20 MG tablet TAKE 1 TABLET TWICE A DAY    fluticasone (FLONASE) 50 mcg/actuation nasal spray 1 spray by Each Nare route once daily.    folic acid (FOLVITE) 1 MG tablet TAKE 1 TABLET DAILY    foscarnet in dextrose 5 % infusion Inject 750 mLs (9,000 mg total) into the vein every 12 (twelve) hours.    insulin degludec (TRESIBA FLEXTOUCH U-200) 200 unit/mL (3 mL) InPn Inject 20 Units into the skin every evening.    insulin lispro (HUMALOG KWIKPEN INSULIN) 100 unit/mL InPn pen Inject 14 Units into the skin 3 (three) times daily before meals. + correction scale TDD: 60 units    magnesium oxide (MAG-OX) 400 mg (241.3 mg magnesium) tablet Take 2 tablets (800 mg total) by mouth 3 (three) times daily.    metFORMIN (GLUCOPHAGE-XR) 500 MG 24 hr tablet TAKE 2 TABLETS BY MOUTH TWICE DAILY WITH MEALS    multivitamin (THERAGRAN) per tablet Take 1 tablet by mouth once daily.    potassium chloride (KLOR-CON) 10 MEQ TbSR Take 1 tablet (10 mEq total) by mouth once daily.    pravastatin (PRAVACHOL) 20 MG tablet TAKE 1 TABLET BY MOUTH ONCE DAILY    predniSONE (DELTASONE) 5 MG tablet Take 1 tablet (5 mg total) by mouth once daily.    sulfamethoxazole-trimethoprim 800-160mg (BACTRIM DS) 800-160 mg Tab Take 1 tablet by mouth every Mon, Wed, Fri.    tacrolimus (PROGRAF) 0.5 MG Cap Take 3 capsules (1.5 mg total) by mouth every 12 (twelve) hours.    furosemide (LASIX) 40 MG tablet Take 1 tablet (40 mg total) by mouth daily as needed.    lancets Misc To check BG 4 times daily, to use with insurance preferred meter    ONETOUCH VERIO Strp USE TO CHECK BLOOD GLUCOSE FOUR TIMES A DAY, TO USE WITH INSURANCE PREFERRED METER    pen needle, diabetic (BD ULTRA-FINE JIM PEN NEEDLES) 32 gauge x 5/32" Ndle Uses 4 times daily, on multiple daily insulin " injections    potassium chloride SA (K-DUR,KLOR-CON) 20 MEQ tablet Take 1 tablet (20 mEq total) by mouth once daily.     Antibiotics (From admission, onward)    Start     Stop Route Frequency Ordered    03/04/19 1115  vancomycin (VANCOCIN) 1,000 mg in sodium chloride 0.9% 250 mL IVPB      -- IV Every 24 hours (non-standard times) 03/04/19 1010    03/03/19 1717  ceFEPIme injection 1 g      -- IV Every 12 hours (non-standard times) 03/03/19 1717        Antifungals (From admission, onward)    None        Antivirals (From admission, onward)    None           Immunization History   Administered Date(s) Administered    Hepatitis A, Adult 04/26/2017, 02/07/2018    Hepatitis B, Dialysis, 3 Dose 04/26/2017, 08/10/2017    Pneumococcal Polysaccharide - 23 Valent 02/07/2018       Family History     Problem Relation (Age of Onset)    Diabetes Father, Brother    Hypertension Mother    Kidney disease Sister        Social History     Socioeconomic History    Marital status:      Spouse name: None    Number of children: None    Years of education: None    Highest education level: None   Social Needs    Financial resource strain: None    Food insecurity - worry: None    Food insecurity - inability: None    Transportation needs - medical: None    Transportation needs - non-medical: None   Occupational History    None   Tobacco Use    Smoking status: Never Smoker    Smokeless tobacco: Never Used   Substance and Sexual Activity    Alcohol use: No    Drug use: No    Sexual activity: None   Other Topics Concern    None   Social History Narrative    None     Review of Systems   Constitutional: Positive for activity change and fatigue. Negative for appetite change, chills and fever.   HENT: Negative for congestion, dental problem, ear pain and rhinorrhea.    Eyes: Negative for pain and discharge.   Respiratory: Negative for cough and shortness of breath.    Cardiovascular: Negative for chest pain and leg  swelling.   Gastrointestinal: Positive for diarrhea. Negative for abdominal distention, abdominal pain, constipation, nausea and vomiting.   Genitourinary: Negative for difficulty urinating and dysuria.   Musculoskeletal: Negative for arthralgias, back pain and joint swelling.        R arm pain   Skin: Negative for rash and wound.   Allergic/Immunologic: Positive for immunocompromised state.   Neurological: Negative for dizziness, light-headedness and headaches.   Psychiatric/Behavioral: Negative for behavioral problems and confusion.     Objective:     Vital Signs (Most Recent):  Temp: 97.9 °F (36.6 °C) (03/04/19 1526)  Pulse: 98 (03/04/19 1526)  Resp: (!) 27 (03/04/19 1526)  BP: (!) 144/97 (03/04/19 1526)  SpO2: (!) 93 % (03/04/19 1526) Vital Signs (24h Range):  Temp:  [97.8 °F (36.6 °C)-98.2 °F (36.8 °C)] 97.9 °F (36.6 °C)  Pulse:  [] 98  Resp:  [14-29] 27  SpO2:  [86 %-97 %] 93 %  BP: (107-144)/(75-97) 144/97     Weight: 99.8 kg (220 lb)  Body mass index is 30.68 kg/m².    Estimated Creatinine Clearance: 32 mL/min (A) (based on SCr of 3.1 mg/dL (H)).    Physical Exam   Constitutional: He is oriented to person, place, and time. He appears well-developed and well-nourished. No distress.   HENT:   Head: Atraumatic.   Right Ear: External ear normal.   Left Ear: External ear normal.   Nose: Nose normal.   Mouth/Throat: Oropharynx is clear and moist.   Eyes: EOM are normal.   Neck: Normal range of motion. Neck supple.   Cardiovascular: Normal rate, regular rhythm and normal heart sounds.   No murmur heard.  Pulmonary/Chest: Effort normal and breath sounds normal. No respiratory distress. He has no wheezes.   Abdominal: Soft. Bowel sounds are normal. He exhibits no distension. There is no tenderness.   Musculoskeletal: Normal range of motion. He exhibits no edema or deformity.   R arm edematous, tender to touch, small scab at previous line site. No fluctuance appreciated, no drainage noted.    Neurological: He is  alert and oriented to person, place, and time. No cranial nerve deficit.   Skin: Skin is warm and dry. No rash noted. He is not diaphoretic. No erythema.   Psychiatric: He has a normal mood and affect. His behavior is normal.       Significant Labs: reviewed    Significant Imaging: I have reviewed all pertinent imaging results/findings within the past 24 hours.

## 2019-03-05 NOTE — SUBJECTIVE & OBJECTIVE
"Interval HPI:   Overnight events: Remains in TSU, NAEON.  BG below goal overnight on current insulin regimen.  Prednisone 5 mg daily, maintenance dose.  On IV antibiotics.  Noted creatinine of 2.5.  Eatin%  Nausea: No  Hypoglycemia and intervention: No  Fever: No  TPN and/or TF: No    BP (!) 147/93   Pulse 97   Temp 98 °F (36.7 °C) (Oral)   Resp 20   Ht 5' 11" (1.803 m)   Wt 99.9 kg (220 lb 3.8 oz)   SpO2 97%   BMI 30.72 kg/m²     Labs Reviewed and Include    Recent Labs   Lab 19  0635   GLU 81   CALCIUM 7.5*   ALBUMIN 2.4*   PROT 5.7*      K 3.1*   CO2 26      BUN 28*   CREATININE 2.5*   ALKPHOS 50*   ALT 35   AST 44*   BILITOT 0.4     Lab Results   Component Value Date    WBC 9.68 2019    HGB 8.5 (L) 2019    HCT 26.5 (L) 2019    MCV 93 2019     (H) 2019     No results for input(s): TSH, FREET4 in the last 168 hours.  Lab Results   Component Value Date    HGBA1C 10.9 (H) 2019       Nutritional status:   Body mass index is 30.72 kg/m².  Lab Results   Component Value Date    ALBUMIN 2.4 (L) 2019    ALBUMIN 3.0 (L) 2019    ALBUMIN 2.6 (L) 2019     No results found for: PREALBUMIN    Estimated Creatinine Clearance: 39.7 mL/min (A) (based on SCr of 2.5 mg/dL (H)).    Accu-Checks  Recent Labs     19  2310 19  0755 19  1150 19  1654 19  2303 19  0834   POCTGLUCOSE 164* 132* 102 163* 125* 105       Current Medications and/or Treatments Impacting Glycemic Control  Immunotherapy:    Immunosuppressants     None        Steroids:   Hormones (From admission, onward)    Start     Stop Route Frequency Ordered    19 0900  predniSONE tablet 5 mg      -- Oral Daily 19 1517        Pressors:    Autonomic Drugs (From admission, onward)    None        Hyperglycemia/Diabetes Medications:   Antihyperglycemics (From admission, onward)    Start     Stop Route Frequency Ordered    19 2100  insulin " detemir U-100 pen 18 Units      -- SubQ Nightly 03/04/19 1322    03/04/19 1645  insulin aspart U-100 pen 10 Units      -- SubQ 3 times daily with meals 03/04/19 1322    03/03/19 1745  insulin aspart U-100 pen 0-5 Units      -- SubQ Before meals & nightly PRN 03/03/19 1644

## 2019-03-05 NOTE — SUBJECTIVE & OBJECTIVE
Past Medical History:   Diagnosis Date    AVILA (acute kidney injury) 8/27/2017    Atrial fibrillation 8/23/2017    On home oxygen therapy     KRISTYN on CPAP     Osteopenia     Pancreatitis 2009    hospitalized    Pulmonary hypertension     Pure hypercholesterolemia 11/15/2017    Sarcoidosis     Shortness of breath     Type 2 diabetes mellitus 11/5/2017       Past Surgical History:   Procedure Laterality Date    ABDOMINAL SURGERY      6 weeks old    BRONCHOSCOPY      CHEST TUBE INSERTION      CHOLECYSTECTOMY      COLONOSCOPY      EGD (ESOPHAGOGASTRODUODENOSCOPY) N/A 8/6/2018    Performed by Ramu Eisenberg MD at [facility] END[facility] (2ND FLR)    flexible bronchoscopy with tissue biopsy N/A 3/21/2018    Performed by [facility] Diagnostic Provider at [facility] OR 2ND FLR    flexible bronchoscopy with tissue biopsy CPT 90274 N/A 8/22/2018    Performed by [facility] at [facility] OR 2ND FLR    flexible bronchoscopy with tissue biopsy CPT 02252 N/A 4/19/2018    Performed by [facility] Diagnostic Provider at [facility] OR 2ND FLR    flexible bronchoscopy with tissue biopsy CPT 98047 N/A 2/21/2018    Performed by [facility] Diagnostic Provider at [facility] OR 2ND FLR    flexible bronchoscopy with tissue biopsy CPT 02247 N/A 12/20/2017    Performed by [facility] at [facility] OR 2ND FLR    flexible bronchoscopy with tissue biopsy CPT 71000 N/A 11/22/2017    Performed by [facility] at [facility] OR 2ND FLR    flexible bronchoscopy with tissue biopsy CPT 32464 N/A 11/2/2017    Performed by [facility] at [facility] OR 2ND FLR    flexible bronchoscopy with tissue biopsy CPT 93561 N/A 10/4/2017    Performed by [facility] at [facility] OR 2ND FLR    flexible bronchoscopy with tissue biopy CPT 09130 N/A 11/20/2018    Performed by [facility] at [facility] OR 2ND FLR    HEART CATH-RIGHT Right 5/22/2017    Performed by Shanell Higuera MD at [facility] CATH LAB    LUNG BIOPSY      LUNG TRANSPLANT  08/2017     PH MONITORING, ESOPHAGUS, WIRELESS, (OFF REFLUX MEDS) N/A 8/6/2018    Performed by [facility]u Eisenberg MD at [facility] ENDO (2ND FLR)    TRANSPLANT-LUNG Bilateral 8/22/2017    Performed by Paulo German MD at [facility] OR 2ND FLR       Review of patient's allergies indicates:  No Known Allergies    Medications:  Medications Prior to Admission   Medication Sig    amLODIPine (NORVASC) 5 MG tablet Take 1 tablet (5 mg total) by mouth once daily.    CALCIUM 600 + D,3, 600 mg(1,500mg) -200 unit Tab TAKE 1 TABLET BY MOUTH TWICE DAILY    ECOTRIN LOW STRENGTH 81 mg EC tablet TAKE 1 TABLET DAILY    famotidine (PEPCID) 20 MG tablet TAKE 1 TABLET TWICE A DAY    fluticasone (FLONASE) 50 mcg/actuation nasal spray 1 spray by Each Nare route once daily.    folic acid (FOLVITE) 1 MG tablet TAKE 1 TABLET DAILY    foscarnet in dextrose 5 % infusion Inject 750 mLs (9,000 mg total) into the vein every 12 (twelve) hours.    insulin degludec (TRESIBA FLEXTOUCH U-200) 200 unit/mL (3 mL) InPn Inject 20 Units into the skin every evening.    insulin lispro (HUMALOG KWIKPEN INSULIN) 100 unit/mL InPn pen Inject 14 Units into the skin 3 (three) times daily before meals. + correction scale TDD: 60 units    magnesium oxide (MAG-OX) 400 mg (241.3 mg magnesium) tablet Take 2 tablets (800 mg total) by mouth 3 (three) times daily.    metFORMIN (GLUCOPHAGE-XR) 500 MG 24 hr tablet TAKE 2 TABLETS BY MOUTH TWICE DAILY WITH MEALS    multivitamin (THERAGRAN) per tablet Take 1 tablet by mouth once daily.    potassium chloride (KLOR-CON) 10 MEQ TbSR Take 1 tablet (10 mEq total) by mouth once daily.    pravastatin (PRAVACHOL) 20 MG tablet TAKE 1 TABLET BY MOUTH ONCE DAILY    predniSONE (DELTASONE) 5 MG tablet Take 1 tablet (5 mg total) by mouth once daily.    sulfamethoxazole-trimethoprim 800-160mg (BACTRIM DS) 800-160 mg Tab Take 1 tablet by mouth every Mon, Wed, Fri.    tacrolimus (PROGRAF) 0.5 MG Cap Take 3 capsules (1.5 mg total) by mouth every  "12 (twelve) hours.    furosemide (LASIX) 40 MG tablet Take 1 tablet (40 mg total) by mouth daily as needed.    lancets Misc To check BG 4 times daily, to use with insurance preferred meter    ONETOUCH VERIO Strp USE TO CHECK BLOOD GLUCOSE FOUR TIMES A DAY, TO USE WITH INSURANCE PREFERRED METER    pen needle, diabetic (BD ULTRA-FINE JIM PEN NEEDLES) 32 gauge x 5/32" Ndle Uses 4 times daily, on multiple daily insulin injections    potassium chloride SA (K-DUR,KLOR-CON) 20 MEQ tablet Take 1 tablet (20 mEq total) by mouth once daily.     Antibiotics (From admission, onward)    Start     Stop Route Frequency Ordered    03/04/19 1115  vancomycin (VANCOCIN) 1,000 mg in sodium chloride 0.9% 250 mL IVPB      -- IV Every 24 hours (non-standard times) 03/04/19 1010    03/03/19 1717  ceFEPIme injection 1 g      -- IV Every 12 hours (non-standard times) 03/03/19 1717        Antifungals (From admission, onward)    None        Antivirals (From admission, onward)    None           Immunization History   Administered Date(s) Administered    Hepatitis A, Adult 04/26/2017, 02/07/2018    Hepatitis B, Dialysis, 3 Dose 04/26/2017, 08/10/2017    Pneumococcal Polysaccharide - 23 Valent 02/07/2018       Family History     Problem Relation (Age of Onset)    Diabetes Father, Brother    Hypertension Mother    Kidney disease Sister        Social History     Socioeconomic History    Marital status:      Spouse name: None    Number of children: None    Years of education: None    Highest education level: None   Social Needs    Financial resource strain: None    Food insecurity - worry: None    Food insecurity - inability: None    Transportation needs - medical: None    Transportation needs - non-medical: None   Occupational History    None   Tobacco Use    Smoking status: Never Smoker    Smokeless tobacco: Never Used   Substance and Sexual Activity    Alcohol use: No    Drug use: No    Sexual activity: None   Other " Topics Concern    None   Social History Narrative    None     Review of Systems   Constitutional: Positive for activity change and fatigue. Negative for appetite change, chills and fever.   HENT: Negative for congestion, dental problem, ear pain and rhinorrhea.    Eyes: Negative for pain and discharge.   Respiratory: Negative for cough and shortness of breath.    Cardiovascular: Negative for chest pain and leg swelling.   Gastrointestinal: Positive for diarrhea. Negative for abdominal distention, abdominal pain, constipation, nausea and vomiting.   Genitourinary: Negative for difficulty urinating and dysuria.   Musculoskeletal: Negative for arthralgias, back pain and joint swelling.        R arm pain   Skin: Negative for rash and wound.   Allergic/Immunologic: Positive for immunocompromised state.   Neurological: Negative for dizziness, light-headedness and headaches.   Psychiatric/Behavioral: Negative for behavioral problems and confusion.     Objective:     Vital Signs (Most Recent):  Temp: 97.9 °F (36.6 °C) (03/04/19 1526)  Pulse: 98 (03/04/19 1526)  Resp: (!) 27 (03/04/19 1526)  BP: (!) 144/97 (03/04/19 1526)  SpO2: (!) 93 % (03/04/19 1526) Vital Signs (24h Range):  Temp:  [97.8 °F (36.6 °C)-98.2 °F (36.8 °C)] 97.9 °F (36.6 °C)  Pulse:  [] 98  Resp:  [14-29] 27  SpO2:  [86 %-97 %] 93 %  BP: (107-144)/(75-97) 144/97     Weight: 99.8 kg (220 lb)  Body mass index is 30.68 kg/m².    Estimated Creatinine Clearance: 32 mL/min (A) (based on SCr of 3.1 mg/dL (H)).    Physical Exam   Constitutional: He is oriented to person, place, and time. He appears well-developed and well-nourished. No distress.   HENT:   Head: Atraumatic.   Right Ear: External ear normal.   Left Ear: External ear normal.   Nose: Nose normal.   Mouth/Throat: Oropharynx is clear and moist.   Eyes: EOM are normal.   Neck: Normal range of motion. Neck supple.   Cardiovascular: Normal rate, regular rhythm and normal heart sounds.   No murmur  heard.  Pulmonary/Chest: Effort normal and breath sounds normal. No respiratory distress. He has no wheezes.   Abdominal: Soft. Bowel sounds are normal. He exhibits no distension. There is no tenderness.   Musculoskeletal: Normal range of motion. He exhibits no edema or deformity.   R arm edematous, tender to touch, small scab at previous line site. No fluctuance appreciated, no drainage noted.    Neurological: He is alert and oriented to person, place, and time. No cranial nerve deficit.   Skin: Skin is warm and dry. No rash noted. He is not diaphoretic. No erythema.   Psychiatric: He has a normal mood and affect. His behavior is normal.       Significant Labs: reviewed    Significant Imaging: I have reviewed all pertinent imaging results/findings within the past 24 hours.

## 2019-03-05 NOTE — ASSESSMENT & PLAN NOTE
58M PMH BOLT, CMV reactivation who presented w/ R arm swelling associated w/ PICC line    Recommendations:  - continue renally dosed vanc and cefepime while awaiting blood and PICC tip cultures  - agree w/ holding foscarnet, follow weekly CMV quant given current AVILA    ID will follow

## 2019-03-05 NOTE — PROGRESS NOTES
"[facility]  Endocrinology  Progress Note    Admit Date: 3/3/2019     Reason for Consult: Management of type 2 DM, Hyperglycemia     Surgical Procedure and Date: 2017 - Bilateral Lung Transplant     Diabetes diagnosis year:      Home Diabetes Medications:   Tresiba 20 units HS and humalog 12 units with meals  Lab Results   Component Value Date    HGBA1C 10.9 (H) 2019        How often checking glucose at home? One   BG readings on regimen: 100s  Hypoglycemia on the regimen?  no  Missed doses on regimen?  Yes about once a day - humalog missed      Diabetes Complications include:     Hyperglycemia     Complicating diabetes co morbidities:   KRISTYN and Glucocorticoid use , A-fib, AVILA, Pancreatitis, Osteopenia        HPI:   Patient is a 58 y.o. male with a diagnosis of type 2 DM on MDI. Also with sarcoidosis (s/p BLT), KRISTYN, osteopenia, and Afib. Patient presented to ED with right upper arm swelling were PICC is located; also with nausea, vomiting, and diarrhea. Endocrinology consulted for BG/ DM management.             Interval HPI:   Overnight events: Remains in TSU, NAEON.  BG below goal overnight on current insulin regimen.  Prednisone 5 mg daily, maintenance dose.  On IV antibiotics.  Noted creatinine of 2.5.  Eatin%  Nausea: No  Hypoglycemia and intervention: No  Fever: No  TPN and/or TF: No    BP (!) 147/93   Pulse 97   Temp 98 °F (36.7 °C) (Oral)   Resp 20   Ht 5' 11" (1.803 m)   Wt 99.9 kg (220 lb 3.8 oz)   SpO2 97%   BMI 30.72 kg/m²      Labs Reviewed and Include    Recent Labs   Lab 19  0635   GLU 81   CALCIUM 7.5*   ALBUMIN 2.4*   PROT 5.7*      K 3.1*   CO2 26      BUN 28*   CREATININE 2.5*   ALKPHOS 50*   ALT 35   AST 44*   BILITOT 0.4     Lab Results   Component Value Date    WBC 9.68 2019    HGB 8.5 (L) 2019    HCT 26.5 (L) 2019    MCV 93 2019     (H) 2019     No results for input(s): TSH, FREET4 in the " last 168 hours.  Lab Results   Component Value Date    HGBA1C 10.9 (H) 02/12/2019       Nutritional status:   Body mass index is 30.72 kg/m².  Lab Results   Component Value Date    ALBUMIN 2.4 (L) 03/05/2019    ALBUMIN 3.0 (L) 03/04/2019    ALBUMIN 2.6 (L) 03/04/2019     No results found for: PREALBUMIN    Estimated Creatinine Clearance: 39.7 mL/min (A) (based on SCr of 2.5 mg/dL (H)).    Accu-Checks  Recent Labs     03/03/19  2310 03/04/19  0755 03/04/19  1150 03/04/19  1654 03/04/19  2303 03/05/19  0834   POCTGLUCOSE 164* 132* 102 163* 125* 105       Current Medications and/or Treatments Impacting Glycemic Control  Immunotherapy:    Immunosuppressants     None        Steroids:   Hormones (From admission, onward)    Start     Stop Route Frequency Ordered    03/04/19 0900  predniSONE tablet 5 mg      -- Oral Daily 03/03/19 1517        Pressors:    Autonomic Drugs (From admission, onward)    None        Hyperglycemia/Diabetes Medications:   Antihyperglycemics (From admission, onward)    Start     Stop Route Frequency Ordered    03/04/19 2100  insulin detemir U-100 pen 18 Units      -- SubQ Nightly 03/04/19 1322    03/04/19 1645  insulin aspart U-100 pen 10 Units      -- SubQ 3 times daily with meals 03/04/19 1322    03/03/19 1745  insulin aspart U-100 pen 0-5 Units      -- SubQ Before meals & nightly PRN 03/03/19 1645          ASSESSMENT and PLAN    * Cellulitis of right upper extremity    Infection may increase insulin resistance.        Type 2 diabetes mellitus with hyperglycemia, with long-term current use of insulin    BG goal 140-180.     Decrease Levemir to 16 units q HS - due to FBG below goal this am  Novolog 10 units with meals  Low dose correction scale - given kidney function  BG monitoring AC/HS    Discharge planning: TBD, likely resume home regimen and follow up on an outpatient basis       Acute kidney injury    Caution with insulin stacking  Estimated Creatinine Clearance: 39.7 mL/min (A) (based on SCr  of 2.5 mg/dL (H)).       Immunosuppression    May increase insulin resistance.     Class 1 obesity due to excess calories with serious comorbidity and body mass index (BMI) of 30.0 to 30.9 in adult    may contribute to insulin resistance  Body mass index is 30.72 kg/m².             Carlos Prieto NP  Endocrinology  [facility]

## 2019-03-06 LAB
ALBUMIN SERPL BCP-MCNC: 3.1 G/DL
ALP SERPL-CCNC: 50 U/L
ALT SERPL W/O P-5'-P-CCNC: 43 U/L
ANION GAP SERPL CALC-SCNC: 12 MMOL/L
ANISOCYTOSIS BLD QL SMEAR: SLIGHT
AST SERPL-CCNC: 43 U/L
BACTERIA CATH TIP CULT: NO GROWTH
BASOPHILS # BLD AUTO: ABNORMAL K/UL
BASOPHILS NFR BLD: 0 %
BILIRUB SERPL-MCNC: 0.5 MG/DL
BUN SERPL-MCNC: 31 MG/DL
CA-I BLDV-SCNC: 1.08 MMOL/L
CALCIUM SERPL-MCNC: 9.7 MG/DL
CHLORIDE SERPL-SCNC: 104 MMOL/L
CO2 SERPL-SCNC: 25 MMOL/L
CREAT SERPL-MCNC: 2.8 MG/DL
DIFFERENTIAL METHOD: ABNORMAL
EOSINOPHIL # BLD AUTO: ABNORMAL K/UL
EOSINOPHIL NFR BLD: 10 %
ERYTHROCYTE [DISTWIDTH] IN BLOOD BY AUTOMATED COUNT: 14.9 %
EST. GFR  (AFRICAN AMERICAN): 27.9 ML/MIN/1.73 M^2
EST. GFR  (NON AFRICAN AMERICAN): 24.1 ML/MIN/1.73 M^2
GLUCOSE SERPL-MCNC: 109 MG/DL
HCT VFR BLD AUTO: 31.5 %
HGB BLD-MCNC: 10.1 G/DL
IMM GRANULOCYTES # BLD AUTO: ABNORMAL K/UL
IMM GRANULOCYTES NFR BLD AUTO: ABNORMAL %
LYMPHOCYTES # BLD AUTO: ABNORMAL K/UL
LYMPHOCYTES NFR BLD: 9 %
MAGNESIUM SERPL-MCNC: 1.8 MG/DL
MCH RBC QN AUTO: 29.6 PG
MCHC RBC AUTO-ENTMCNC: 32.1 G/DL
MCV RBC AUTO: 92 FL
MONOCYTES # BLD AUTO: ABNORMAL K/UL
MONOCYTES NFR BLD: 3 %
NEUTROPHILS NFR BLD: 77 %
NEUTS BAND NFR BLD MANUAL: 1 %
NRBC BLD-RTO: 0 /100 WBC
OVALOCYTES BLD QL SMEAR: ABNORMAL
PHOSPHATE SERPL-MCNC: 3 MG/DL
PLATELET # BLD AUTO: 465 K/UL
PLATELET BLD QL SMEAR: ABNORMAL
PMV BLD AUTO: 8.6 FL
POCT GLUCOSE: 101 MG/DL (ref 70–110)
POCT GLUCOSE: 124 MG/DL (ref 70–110)
POCT GLUCOSE: 125 MG/DL (ref 70–110)
POCT GLUCOSE: 132 MG/DL (ref 70–110)
POCT GLUCOSE: 141 MG/DL (ref 70–110)
POLYCHROMASIA BLD QL SMEAR: ABNORMAL
POTASSIUM SERPL-SCNC: 3.7 MMOL/L
PROT SERPL-MCNC: 7.3 G/DL
RBC # BLD AUTO: 3.41 M/UL
SODIUM SERPL-SCNC: 141 MMOL/L
TACROLIMUS BLD-MCNC: 4.9 NG/ML
TOXIC GRANULES BLD QL SMEAR: PRESENT
WBC # BLD AUTO: 9.92 K/UL

## 2019-03-06 PROCEDURE — 25000003 PHARM REV CODE 250: Performed by: PHYSICIAN ASSISTANT

## 2019-03-06 PROCEDURE — 82330 ASSAY OF CALCIUM: CPT

## 2019-03-06 PROCEDURE — 63600175 PHARM REV CODE 636 W HCPCS: Performed by: PHYSICIAN ASSISTANT

## 2019-03-06 PROCEDURE — 99233 SBSQ HOSP IP/OBS HIGH 50: CPT | Mod: ,,, | Performed by: INTERNAL MEDICINE

## 2019-03-06 PROCEDURE — 25000242 PHARM REV CODE 250 ALT 637 W/ HCPCS: Performed by: PHYSICIAN ASSISTANT

## 2019-03-06 PROCEDURE — 36415 COLL VENOUS BLD VENIPUNCTURE: CPT

## 2019-03-06 PROCEDURE — 99232 PR SUBSEQUENT HOSPITAL CARE,LEVL II: ICD-10-PCS | Mod: ,,, | Performed by: INTERNAL MEDICINE

## 2019-03-06 PROCEDURE — 25000003 PHARM REV CODE 250: Performed by: INTERNAL MEDICINE

## 2019-03-06 PROCEDURE — 85007 BL SMEAR W/DIFF WBC COUNT: CPT

## 2019-03-06 PROCEDURE — 99233 PR SUBSEQUENT HOSPITAL CARE,LEVL III: ICD-10-PCS | Mod: ,,, | Performed by: INTERNAL MEDICINE

## 2019-03-06 PROCEDURE — 83735 ASSAY OF MAGNESIUM: CPT

## 2019-03-06 PROCEDURE — 80197 ASSAY OF TACROLIMUS: CPT

## 2019-03-06 PROCEDURE — 99232 SBSQ HOSP IP/OBS MODERATE 35: CPT | Mod: ,,, | Performed by: INTERNAL MEDICINE

## 2019-03-06 PROCEDURE — 99232 SBSQ HOSP IP/OBS MODERATE 35: CPT | Mod: ,,, | Performed by: NURSE PRACTITIONER

## 2019-03-06 PROCEDURE — 20600001 HC STEP DOWN PRIVATE ROOM

## 2019-03-06 PROCEDURE — 85027 COMPLETE CBC AUTOMATED: CPT

## 2019-03-06 PROCEDURE — 80053 COMPREHEN METABOLIC PANEL: CPT

## 2019-03-06 PROCEDURE — 84100 ASSAY OF PHOSPHORUS: CPT

## 2019-03-06 PROCEDURE — 99232 PR SUBSEQUENT HOSPITAL CARE,LEVL II: ICD-10-PCS | Mod: ,,, | Performed by: NURSE PRACTITIONER

## 2019-03-06 PROCEDURE — 63600175 PHARM REV CODE 636 W HCPCS: Performed by: NURSE PRACTITIONER

## 2019-03-06 RX ORDER — INSULIN ASPART 100 [IU]/ML
9 INJECTION, SOLUTION INTRAVENOUS; SUBCUTANEOUS
Status: DISCONTINUED | OUTPATIENT
Start: 2019-03-06 | End: 2019-03-07

## 2019-03-06 RX ADMIN — INSULIN ASPART 9 UNITS: 100 INJECTION, SOLUTION INTRAVENOUS; SUBCUTANEOUS at 05:03

## 2019-03-06 RX ADMIN — FAMOTIDINE 20 MG: 20 TABLET ORAL at 09:03

## 2019-03-06 RX ADMIN — FAMOTIDINE 20 MG: 20 TABLET ORAL at 08:03

## 2019-03-06 RX ADMIN — FLUTICASONE PROPIONATE 50 MCG: 50 SPRAY, METERED NASAL at 12:03

## 2019-03-06 RX ADMIN — APIXABAN 2.5 MG: 2.5 TABLET, FILM COATED ORAL at 09:03

## 2019-03-06 RX ADMIN — POTASSIUM CHLORIDE 20 MEQ: 1500 TABLET, EXTENDED RELEASE ORAL at 08:03

## 2019-03-06 RX ADMIN — VANCOMYCIN HYDROCHLORIDE 1000 MG: 1 INJECTION, POWDER, LYOPHILIZED, FOR SOLUTION INTRAVENOUS at 01:03

## 2019-03-06 RX ADMIN — AMLODIPINE BESYLATE 5 MG: 5 TABLET ORAL at 08:03

## 2019-03-06 RX ADMIN — FAMOTIDINE 20 MG: 20 TABLET ORAL at 12:03

## 2019-03-06 RX ADMIN — APIXABAN 2.5 MG: 2.5 TABLET, FILM COATED ORAL at 08:03

## 2019-03-06 RX ADMIN — CEFEPIME 1 G: 1 INJECTION, POWDER, FOR SOLUTION INTRAMUSCULAR; INTRAVENOUS at 07:03

## 2019-03-06 RX ADMIN — APIXABAN 2.5 MG: 2.5 TABLET, FILM COATED ORAL at 12:03

## 2019-03-06 RX ADMIN — INSULIN ASPART 10 UNITS: 100 INJECTION, SOLUTION INTRAVENOUS; SUBCUTANEOUS at 12:03

## 2019-03-06 RX ADMIN — PRAVASTATIN SODIUM 20 MG: 20 TABLET ORAL at 08:03

## 2019-03-06 RX ADMIN — FOLIC ACID 1000 MCG: 1 TABLET ORAL at 08:03

## 2019-03-06 RX ADMIN — PREDNISONE 5 MG: 5 TABLET ORAL at 08:03

## 2019-03-06 RX ADMIN — MAGNESIUM 64 MG (MAGNESIUM CHLORIDE) TABLET,DELAYED RELEASE 128 MG: at 08:03

## 2019-03-06 RX ADMIN — ASPIRIN 81 MG: 81 TABLET, COATED ORAL at 08:03

## 2019-03-06 RX ADMIN — INSULIN ASPART 10 UNITS: 100 INJECTION, SOLUTION INTRAVENOUS; SUBCUTANEOUS at 08:03

## 2019-03-06 RX ADMIN — CEFTRIAXONE 2 G: 2 INJECTION, SOLUTION INTRAVENOUS at 03:03

## 2019-03-06 NOTE — ASSESSMENT & PLAN NOTE
Current prednisone 5 mg daily.  Will continue to hold tacrolimus in setting of AVILA. Will monitor daily tacrolimus levels daily while inpatient.

## 2019-03-06 NOTE — ASSESSMENT & PLAN NOTE
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  PICC line was removed and purulence noted at PICC line insertion site at time of removal, cultures NGTD. FOS being held, 3/3 CMV quant 64 IU/ml, arm improving.    - given concern for PICC infection would give 2 weeks of empiric vancomycin and ceftriaxone  - hold FOS for now and continue pre-emptive monitoring and can discuss as outpatient restarting FOS though recommend chest wall CVC  - discussed with team, will sign off for now, call back if questions

## 2019-03-06 NOTE — SUBJECTIVE & OBJECTIVE
Interval History: feels very well today, afebrile, R arm swelling improving, culture from PICC drainage still NGTD    Review of Systems   Constitutional: Negative for activity change, appetite change, chills, fatigue and fever.   HENT: Negative for congestion, dental problem, mouth sores and sinus pressure.    Eyes: Negative for pain, redness and visual disturbance.   Respiratory: Negative for cough, shortness of breath and wheezing.    Cardiovascular: Negative for chest pain and leg swelling.   Gastrointestinal: Negative for abdominal distention, abdominal pain, diarrhea, nausea and vomiting.   Endocrine: Negative for polyuria.   Genitourinary: Negative for decreased urine volume, dysuria and frequency.   Musculoskeletal: Negative for joint swelling.   Skin: Negative for color change.   Allergic/Immunologic: Negative for food allergies.   Neurological: Negative for dizziness, weakness and headaches.   Hematological: Negative for adenopathy.   Psychiatric/Behavioral: Negative for agitation and confusion. The patient is not nervous/anxious.      Objective:     Vital Signs (Most Recent):  Temp: 99.6 °F (37.6 °C) (03/06/19 1258)  Pulse: 78 (03/06/19 1645)  Resp: 19 (03/06/19 1645)  BP: (!) 143/88 (03/06/19 1645)  SpO2: (!) 92 % (03/06/19 1645) Vital Signs (24h Range):  Temp:  [98.2 °F (36.8 °C)-99.6 °F (37.6 °C)] 99.6 °F (37.6 °C)  Pulse:  [78-94] 78  Resp:  [18-29] 19  SpO2:  [91 %-98 %] 92 %  BP: (128-155)/(81-96) 143/88     Weight: 99.9 kg (220 lb 3.8 oz)  Body mass index is 30.72 kg/m².    Estimated Creatinine Clearance: 35.5 mL/min (A) (based on SCr of 2.8 mg/dL (H)).    Physical Exam   Constitutional: He is oriented to person, place, and time. He appears well-developed and well-nourished.   HENT:   Head: Normocephalic and atraumatic.   Mouth/Throat: Oropharynx is clear and moist.   Eyes: Conjunctivae are normal.   Neck: Neck supple.   Cardiovascular: Normal rate, regular rhythm and normal heart sounds.   No  murmur heard.  Pulmonary/Chest: Effort normal and breath sounds normal. No respiratory distress. He has no wheezes.   Abdominal: Soft. Bowel sounds are normal. He exhibits no distension. There is no tenderness.   Musculoskeletal: Normal range of motion. He exhibits no edema or tenderness.   Lymphadenopathy:     He has no cervical adenopathy.   Neurological: He is alert and oriented to person, place, and time. Coordination normal.   Skin: Skin is warm and dry. No rash noted.   RUE EDema   Psychiatric: He has a normal mood and affect. His behavior is normal.       Significant Labs:   CBC:   Recent Labs   Lab 03/05/19 0635 03/06/19  0629   WBC 9.68 9.92   HGB 8.5* 10.1*   HCT 26.5* 31.5*   * 465*     CMP:   Recent Labs   Lab 03/04/19 2024 03/05/19 0635 03/06/19 0629   NA  --  142 141   K 3.9 3.1* 3.7   CL  --  108 104   CO2  --  26 25   GLU  --  81 109   BUN  --  28* 31*   CREATININE  --  2.5* 2.8*   CALCIUM  --  7.5* 9.7   PROT  --  5.7* 7.3   ALBUMIN  --  2.4* 3.1*   BILITOT  --  0.4 0.5   ALKPHOS  --  50* 50*   AST  --  44* 43*   ALT  --  35 43   ANIONGAP  --  8 12   EGFRNONAA  --  27.7* 24.1*       Significant Imaging: I have reviewed all pertinent imaging results/findings within the past 24 hours.

## 2019-03-06 NOTE — ASSESSMENT & PLAN NOTE
Patient is s/p BLT 18 months ago secondary to sarcoidosis with associated pulmonary hypertension. 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 Last Foscarnet infusion on 3/2 PM dose.  Continue to hold tacrolimus while AVILA resolves. Continue immunosuppression with prednisone 5 mg daily.  Maintain O2 sats > 88% throughout the day. Encourage ambulation.

## 2019-03-06 NOTE — ASSESSMENT & PLAN NOTE
BG goal 140-180.     Levemir 16 units q HS  Decrease Novolog to 9 units with meals  Low dose correction scale - given kidney function  BG monitoring AC/HS    Discharge planning: TBD, likely resume home regimen and follow up on an outpatient basis

## 2019-03-06 NOTE — ASSESSMENT & PLAN NOTE
Initially had a WBC of 21, which was concerning for possible bacteremia versus cellulitis from PICC line. PICC removed while in ER. Tip sent for culture. Blood cultures x2 are pending with so far NGTD. Procalcitonin significantly elevated at 11.32 at admit.  Was on IV vancomycin and cefepime, but will change Cefepime to ceftriaxone per ID recs.  Will be treated for a full 14 days.  Will likely need a PICC or [place] placed prior to discharge.  Right arm swelling is decreased and erythema is improving.

## 2019-03-06 NOTE — ASSESSMENT & PLAN NOTE
Cr 2.8 today. Will continue to hold Foscarnet infusions until Cr and electrolyte imbalances stable.

## 2019-03-06 NOTE — SUBJECTIVE & OBJECTIVE
"Interval HPI:   Overnight events: Remains in TSU, JOHAN.  BG well controlled on current insulin regimen, slightly below goal this am (124).  Prednisone 5 mg daily, maintenance dose.  On IV antibiotics.  Noted creatinine of 2.8.  Eatin%  Nausea: No  Hypoglycemia and intervention: No  Fever: No  TPN and/or TF: No    /82   Pulse 88   Temp 98.2 °F (36.8 °C)   Resp 19   Ht 5' 11" (1.803 m)   Wt 99.9 kg (220 lb 3.8 oz)   SpO2 98%   BMI 30.72 kg/m²     Labs Reviewed and Include    Recent Labs   Lab 19  0629      CALCIUM 9.7   ALBUMIN 3.1*   PROT 7.3      K 3.7   CO2 25      BUN 31*   CREATININE 2.8*   ALKPHOS 50*   ALT 43   AST 43*   BILITOT 0.5     Lab Results   Component Value Date    WBC 9.92 2019    HGB 10.1 (L) 2019    HCT 31.5 (L) 2019    MCV 92 2019     (H) 2019     No results for input(s): TSH, FREET4 in the last 168 hours.  Lab Results   Component Value Date    HGBA1C 10.9 (H) 2019       Nutritional status:   Body mass index is 30.72 kg/m².  Lab Results   Component Value Date    ALBUMIN 3.1 (L) 2019    ALBUMIN 2.4 (L) 2019    ALBUMIN 3.0 (L) 2019     No results found for: PREALBUMIN    Estimated Creatinine Clearance: 35.5 mL/min (A) (based on SCr of 2.8 mg/dL (H)).    Accu-Checks  Recent Labs     19  0755 19  1150 19  1654 19  2303 19  0834 19  0902 19  1251 19  1641 19  0006 19  0822   POCTGLUCOSE 132* 102 163* 125* 105 129* 154* 182* 141* 124*       Current Medications and/or Treatments Impacting Glycemic Control  Immunotherapy:    Immunosuppressants     None        Steroids:   Hormones (From admission, onward)    Start     Stop Route Frequency Ordered    19 0900  predniSONE tablet 5 mg      -- Oral Daily 19 4557        Pressors:    Autonomic Drugs (From admission, onward)    None        Hyperglycemia/Diabetes Medications: "   Antihyperglycemics (From admission, onward)    Start     Stop Route Frequency Ordered    03/05/19 2100  insulin detemir U-100 pen 16 Units      -- SubQ Nightly 03/05/19 0911    03/04/19 1645  insulin aspart U-100 pen 10 Units      -- SubQ 3 times daily with meals 03/04/19 1322    03/03/19 1745  insulin aspart U-100 pen 0-5 Units      -- SubQ Before meals & nightly PRN 03/03/19 1645

## 2019-03-06 NOTE — PROGRESS NOTES
[facility]  Infectious Disease  Progress Note    Patient Name: Martin Hendrix Jr.  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  PICC line was removed and purulence noted at PICC line insertion site at time of removal, cultures NGTD. FOS being held, 3/3 CMV quant 64 IU/ml, arm improving.    - given concern for PICC infection would give 2 weeks of empiric vancomycin and ceftriaxone  - hold FOS for now and continue pre-emptive monitoring and can discuss as outpatient restarting FOS though recommend chest wall CVC  - discussed with team, will sign off for now, call back if questions           Anticipated Disposition: pending    Thank you for your consult. I will sign off. Please contact us if you have any additional questions.    Buzz Santillan MD  Infectious Disease  [facility]    Subjective:     Principal Problem:Cellulitis of right upper extremity    HPI: 58M PMH BOLT (CMV D+/R-), ACR w/ pulse steroids and thymo, dx w/ CMV reactivation in 8/2018 UL97 mutation on foscarnet who presented w/ possible PICC line infection w/ pain and edema in R arm. He endorsed possible drainage from PICC site, described it as pus. He has been feeling more fatigued lately. Noted to have AVILA on admission, foscarnet is currently being held. Last CMV quant was in the 300s. PICC line has been removed, all cultures are pending. Patient endorsed some diarrhea, c. Diff negative.   Interval History: feels very well today, afebrile, R arm  swelling improving, culture from PICC drainage still NGTD    Review of Systems   Constitutional: Negative for activity change, appetite change, chills, fatigue and fever.   HENT: Negative for congestion, dental problem, mouth sores and sinus pressure.    Eyes: Negative for pain, redness and visual disturbance.   Respiratory: Negative for cough, shortness of breath and wheezing.    Cardiovascular: Negative for chest pain and leg swelling.   Gastrointestinal: Negative for abdominal distention, abdominal pain, diarrhea, nausea and vomiting.   Endocrine: Negative for polyuria.   Genitourinary: Negative for decreased urine volume, dysuria and frequency.   Musculoskeletal: Negative for joint swelling.   Skin: Negative for color change.   Allergic/Immunologic: Negative for food allergies.   Neurological: Negative for dizziness, weakness and headaches.   Hematological: Negative for adenopathy.   Psychiatric/Behavioral: Negative for agitation and confusion. The patient is not nervous/anxious.      Objective:     Vital Signs (Most Recent):  Temp: 99.6 °F (37.6 °C) (03/06/19 1258)  Pulse: 78 (03/06/19 1645)  Resp: 19 (03/06/19 1645)  BP: (!) 143/88 (03/06/19 1645)  SpO2: (!) 92 % (03/06/19 1645) Vital Signs (24h Range):  Temp:  [98.2 °F (36.8 °C)-99.6 °F (37.6 °C)] 99.6 °F (37.6 °C)  Pulse:  [78-94] 78  Resp:  [18-29] 19  SpO2:  [91 %-98 %] 92 %  BP: (128-155)/(81-96) 143/88     Weight: 99.9 kg (220 lb 3.8 oz)  Body mass index is 30.72 kg/m².    Estimated Creatinine Clearance: 35.5 mL/min (A) (based on SCr of 2.8 mg/dL (H)).    Physical Exam   Constitutional: He is oriented to person, place, and time. He appears well-developed and well-nourished.   HENT:   Head: Normocephalic and atraumatic.   Mouth/Throat: Oropharynx is clear and moist.   Eyes: Conjunctivae are normal.   Neck: Neck supple.   Cardiovascular: Normal rate, regular rhythm and normal heart sounds.   No murmur heard.  Pulmonary/Chest: Effort normal and breath  sounds normal. No respiratory distress. He has no wheezes.   Abdominal: Soft. Bowel sounds are normal. He exhibits no distension. There is no tenderness.   Musculoskeletal: Normal range of motion. He exhibits no edema or tenderness.   Lymphadenopathy:     He has no cervical adenopathy.   Neurological: He is alert and oriented to person, place, and time. Coordination normal.   Skin: Skin is warm and dry. No rash noted.   RUE EDema   Psychiatric: He has a normal mood and affect. His behavior is normal.       Significant Labs:   CBC:   Recent Labs   Lab 03/05/19 0635 03/06/19 0629   WBC 9.68 9.92   HGB 8.5* 10.1*   HCT 26.5* 31.5*   * 465*     CMP:   Recent Labs   Lab 03/04/19 2024 03/05/19 0635 03/06/19 0629   NA  --  142 141   K 3.9 3.1* 3.7   CL  --  108 104   CO2  --  26 25   GLU  --  81 109   BUN  --  28* 31*   CREATININE  --  2.5* 2.8*   CALCIUM  --  7.5* 9.7   PROT  --  5.7* 7.3   ALBUMIN  --  2.4* 3.1*   BILITOT  --  0.4 0.5   ALKPHOS  --  50* 50*   AST  --  44* 43*   ALT  --  35 43   ANIONGAP  --  8 12   EGFRNONAA  --  27.7* 24.1*       Significant Imaging: I have reviewed all pertinent imaging results/findings within the past 24 hours.

## 2019-03-06 NOTE — SUBJECTIVE & OBJECTIVE
Subjective:     Interval History: No acute events overnight.  Reports feeling better today.  Right arm with some improvement    Continuous Infusions:  Scheduled Meds:   amLODIPine  5 mg Oral Daily    apixaban  2.5 mg Oral BID    aspirin  81 mg Oral Daily    cefTRIAXone (ROCEPHIN) IVPB  2 g Intravenous Q24H    famotidine  20 mg Oral BID    fluticasone  1 spray Each Nare Daily    folic acid  1,000 mcg Oral Daily    insulin aspart U-100  10 Units Subcutaneous TIDWM    insulin detemir U-100  16 Units Subcutaneous QHS    magnesium chloride  128 mg Oral Daily    potassium chloride SA  20 mEq Oral Daily    pravastatin  20 mg Oral Daily    predniSONE  5 mg Oral Daily    vancomycin (VANCOCIN) IVPB (custom)  1,000 mg Intravenous Q24H     PRN Meds:acetaminophen, calcium gluconate IVPB, calcium gluconate IVPB, calcium gluconate IVPB, dextrose 50%, dextrose 50%, glucagon (human recombinant), glucose, glucose, insulin aspart U-100, magnesium sulfate IVPB **AND** magnesium sulfate IVPB, ondansetron, potassium chloride 10% **AND** potassium chloride 10% **AND** potassium chloride 10%, promethazine (PHENERGAN) IVPB    Review of patient's allergies indicates:  No Known Allergies    Review of Systems   Constitutional: Positive for appetite change and fatigue. Negative for chills, diaphoresis and fever.   HENT: Negative for congestion, rhinorrhea, sinus pain, sore throat and voice change.    Eyes: Negative for discharge and redness.   Respiratory: Positive for cough. Negative for shortness of breath and wheezing.    Cardiovascular: Positive for leg swelling. Negative for chest pain.   Gastrointestinal: Negative for abdominal distention, abdominal pain, diarrhea, nausea and vomiting.   Endocrine: Negative for polydipsia and polyuria.   Genitourinary: Negative for dysuria, frequency and urgency.   Musculoskeletal: Positive for myalgias. Negative for back pain, neck pain and neck stiffness.   Skin: Positive for color change  (RUE). Negative for pallor.   Allergic/Immunologic: Positive for immunocompromised state.   Neurological: Negative for dizziness, light-headedness and headaches.   Hematological: Negative for adenopathy. Does not bruise/bleed easily.   Psychiatric/Behavioral: Negative for agitation, confusion and decreased concentration.     Objective:   Physical Exam   Constitutional: He is oriented to person, place, and time. He appears well-developed and well-nourished. No distress.   HENT:   Head: Normocephalic and atraumatic.   Right Ear: External ear normal.   Left Ear: External ear normal.   Nose: Nose normal.   Mouth/Throat: Oropharynx is clear and moist.   Eyes: Conjunctivae and EOM are normal. Right eye exhibits no discharge. Left eye exhibits no discharge.   Neck: Normal range of motion. Neck supple. No JVD present.   Cardiovascular: Normal rate, regular rhythm and normal heart sounds. Exam reveals no gallop and no friction rub.   No murmur heard.  Pulmonary/Chest: Effort normal. He has no wheezes. He has no rhonchi. He has no rales.   Well-healed mid-line sternotomy incision   Abdominal: Soft. Bowel sounds are normal. He exhibits no distension. There is no tenderness.   Musculoskeletal: Normal range of motion. He exhibits edema (RUE with associated erythema; BLE to ankle).   Neurological: He is alert and oriented to person, place, and time.   Skin: Skin is warm and dry. He is not diaphoretic. There is erythema (RUE, improving).   Psychiatric: He has a normal mood and affect. His behavior is normal.   Nursing note and vitals reviewed.        Vital Signs (Most Recent):  Temp: 99.6 °F (37.6 °C) (03/06/19 1258)  Pulse: 94 (03/06/19 0800)  Resp: 18 (03/06/19 0800)  BP: (!) 155/96 (03/06/19 0800)  SpO2: (!) 94 % (03/06/19 0800) Vital Signs (24h Range):  Temp:  [97.7 °F (36.5 °C)-99.6 °F (37.6 °C)] 99.6 °F (37.6 °C)  Pulse:  [85-94] 94  Resp:  [18-29] 18  SpO2:  [91 %-98 %] 94 %  BP: (128-155)/(81-96) 155/96     Weight: 99.9 kg  (220 lb 3.8 oz)  Body mass index is 30.72 kg/m².      Intake/Output Summary (Last 24 hours) at 3/6/2019 1432  Last data filed at 3/5/2019 1726  Gross per 24 hour   Intake 1230 ml   Output 1160 ml   Net 70 ml       Significant Labs:  CBC:  Recent Labs   Lab 03/06/19 0629   WBC 9.92   RBC 3.41*   HGB 10.1*   HCT 31.5*   *   MCV 92   MCH 29.6   MCHC 32.1     BMP:  Recent Labs   Lab 03/06/19  0629      K 3.7      CO2 25   BUN 31*   CREATININE 2.8*   CALCIUM 9.7      Tacrolimus Levels:  Recent Labs   Lab 03/06/19 0629   TACROLIMUS 4.9*     Microbiology:  Microbiology Results (last 7 days)     Procedure Component Value Units Date/Time    IV catheter culture [[id number]] Collected:  03/03/19 1502    Order Status:  Completed Specimen:  Catheter Tip, PICC Updated:  03/06/19 0933     Aerobic Culture - Cath tip No growth    Blood culture #1 **CANNOT BE ORDERED STAT** [[id number]] Collected:  03/03/19 1352    Order Status:  Completed Specimen:  Blood from Peripheral, Antecubital, Left Updated:  03/05/19 1612     Blood Culture, Routine No Growth to date     Blood Culture, Routine No Growth to date     Blood Culture, Routine No Growth to date    Blood culture #2 **CANNOT BE ORDERED STAT** [[id number]] Collected:  03/03/19 1443    Order Status:  Completed Specimen:  Blood from Peripheral, Wrist, Left Updated:  03/05/19 1612     Blood Culture, Routine No Growth to date     Blood Culture, Routine No Growth to date     Blood Culture, Routine No Growth to date    Clostridium difficile EIA [[id number]] Collected:  03/03/19 2106    Order Status:  Completed Specimen:  Stool Updated:  03/04/19 0353     C. diff Antigen Negative     C difficile Toxins A+B, EIA Negative     Comment: Testing not recommended for children <24 months old.             I have reviewed all pertinent labs within the past 24 hours.

## 2019-03-06 NOTE — ASSESSMENT & PLAN NOTE
Caution with insulin stacking  Estimated Creatinine Clearance: 35.5 mL/min (A) (based on SCr of 2.8 mg/dL (H)).

## 2019-03-06 NOTE — ASSESSMENT & PLAN NOTE
Started on Foscarnet IV infusions BID on 2/13/2019 and has been closely followed by ID, Dr. Lowry.  Will continue to hold Foscarnet infusions for now while awaiting resolution of AVILA and electrolyte imbalances. Most recent CMV PCR on 3/3 was 63.  ID following, bernie recs.

## 2019-03-06 NOTE — PLAN OF CARE
Problem: Adult Inpatient Plan of Care  Goal: Plan of Care Review  Outcome: Ongoing (interventions implemented as appropriate)  VSS. Call light and personal items in reach. Pt will have a drug blood work study sent off w/ morning labs. No issues today. WCTM.

## 2019-03-06 NOTE — PROGRESS NOTES
"[facility]  Endocrinology  Progress Note    Admit Date: 3/3/2019     Reason for Consult: Management of type 2 DM, Hyperglycemia     Surgical Procedure and Date: 2017 - Bilateral Lung Transplant     Diabetes diagnosis year:      Home Diabetes Medications:   Tresiba 20 units HS and humalog 12 units with meals  Lab Results   Component Value Date    HGBA1C 10.9 (H) 2019        How often checking glucose at home? One   BG readings on regimen: 100s  Hypoglycemia on the regimen?  no  Missed doses on regimen?  Yes about once a day - humalog missed      Diabetes Complications include:     Hyperglycemia     Complicating diabetes co morbidities:   KRISTYN and Glucocorticoid use , A-fib, AVILA, Pancreatitis, Osteopenia        HPI:   Patient is a 58 y.o. male with a diagnosis of type 2 DM on MDI. Also with sarcoidosis (s/p BLT), KRISTYN, osteopenia, and Afib. Patient presented to ED with right upper arm swelling were PICC is located; also with nausea, vomiting, and diarrhea. Endocrinology consulted for BG/ DM management.             Interval HPI:   Overnight events: Remains in TSU, NAEON.  BG  well controlled on current insulin regimen, slightly below goal this am (124).  Prednisone 5 mg daily, maintenance dose.  On IV antibiotics.  Noted creatinine of 2.8.  Eatin%  Nausea: No  Hypoglycemia and intervention: No  Fever: No  TPN and/or TF: No    /82   Pulse 88   Temp 98.2 °F (36.8 °C)   Resp 19   Ht 5' 11" (1.803 m)   Wt 99.9 kg (220 lb 3.8 oz)   SpO2 98%   BMI 30.72 kg/m²      Labs Reviewed and Include    Recent Labs   Lab 19  0629      CALCIUM 9.7   ALBUMIN 3.1*   PROT 7.3      K 3.7   CO2 25      BUN 31*   CREATININE 2.8*   ALKPHOS 50*   ALT 43   AST 43*   BILITOT 0.5     Lab Results   Component Value Date    WBC 9.92 2019    HGB 10.1 (L) 2019    HCT 31.5 (L) 2019    MCV 92 2019     (H) 2019     No results for input(s): " TSH, FREET4 in the last 168 hours.  Lab Results   Component Value Date    HGBA1C 10.9 (H) 02/12/2019       Nutritional status:   Body mass index is 30.72 kg/m².  Lab Results   Component Value Date    ALBUMIN 3.1 (L) 03/06/2019    ALBUMIN 2.4 (L) 03/05/2019    ALBUMIN 3.0 (L) 03/04/2019     No results found for: PREALBUMIN    Estimated Creatinine Clearance: 35.5 mL/min (A) (based on SCr of 2.8 mg/dL (H)).    Accu-Checks  Recent Labs     03/04/19  0755 03/04/19  1150 03/04/19  1654 03/04/19  2303 03/05/19  0834 03/05/19  0902 03/05/19  1251 03/05/19  1641 03/06/19  0006 03/06/19  0822   POCTGLUCOSE 132* 102 163* 125* 105 129* 154* 182* 141* 124*       Current Medications and/or Treatments Impacting Glycemic Control  Immunotherapy:    Immunosuppressants     None        Steroids:   Hormones (From admission, onward)    Start     Stop Route Frequency Ordered    03/04/19 0900  predniSONE tablet 5 mg      -- Oral Daily 03/03/19 1517        Pressors:    Autonomic Drugs (From admission, onward)    None        Hyperglycemia/Diabetes Medications:   Antihyperglycemics (From admission, onward)    Start     Stop Route Frequency Ordered    03/05/19 2100  insulin detemir U-100 pen 16 Units      -- SubQ Nightly 03/05/19 0911    03/04/19 1645  insulin aspart U-100 pen 10 Units      -- SubQ 3 times daily with meals 03/04/19 1322    03/03/19 1745  insulin aspart U-100 pen 0-5 Units      -- SubQ Before meals & nightly PRN 03/03/19 1645          ASSESSMENT and PLAN    * Cellulitis of right upper extremity    Infection may increase insulin resistance.        Type 2 diabetes mellitus with hyperglycemia, with long-term current use of insulin    BG goal 140-180.     Levemir 16 units q HS  Decrease Novolog to 9 units with meals  Low dose correction scale - given kidney function  BG monitoring AC/HS    Discharge planning: TBD, likely resume home regimen and follow up on an outpatient basis       Acute kidney injury    Caution with insulin  stacking  Estimated Creatinine Clearance: 35.5 mL/min (A) (based on SCr of 2.8 mg/dL (H)).       Immunosuppression    May increase insulin resistance.     Class 1 obesity due to excess calories with serious comorbidity and body mass index (BMI) of 30.0 to 30.9 in adult    may contribute to insulin resistance  Body mass index is 30.72 kg/m².             Carlos Prieto NP  Endocrinology  [facility]

## 2019-03-06 NOTE — PROGRESS NOTES
[facility]  Lung Transplant  Progress Note - Floor    Patient Name: Martin Hendrix Jr.  MRN: [id number]  Admission Date: 3/3/2019  Hospital Length of Stay: 3 days  Post-Operative Day: 561  Attending Physician: Samantha Bill DO  Primary Care Provider: Sukhi Dunham Jr, MD     Subjective:     Interval History: No acute events overnight.  Reports feeling better today.  Right arm with some improvement    Continuous Infusions:  Scheduled Meds:   amLODIPine  5 mg Oral Daily    apixaban  2.5 mg Oral BID    aspirin  81 mg Oral Daily    cefTRIAXone (ROCEPHIN) IVPB  2 g Intravenous Q24H    famotidine  20 mg Oral BID    fluticasone  1 spray Each Nare Daily    folic acid  1,000 mcg Oral Daily    insulin aspart U-100  10 Units Subcutaneous TIDWM    insulin detemir U-100  16 Units Subcutaneous QHS    magnesium chloride  128 mg Oral Daily    potassium chloride SA  20 mEq Oral Daily    pravastatin  20 mg Oral Daily    predniSONE  5 mg Oral Daily    vancomycin (VANCOCIN) IVPB (custom)  1,000 mg Intravenous Q24H     PRN Meds:acetaminophen, calcium gluconate IVPB, calcium gluconate IVPB, calcium gluconate IVPB, dextrose 50%, dextrose 50%, glucagon (human recombinant), glucose, glucose, insulin aspart U-100, magnesium sulfate IVPB **AND** magnesium sulfate IVPB, ondansetron, potassium chloride 10% **AND** potassium chloride 10% **AND** potassium chloride 10%, promethazine (PHENERGAN) IVPB    Review of patient's allergies indicates:  No Known Allergies    Review of Systems   Constitutional: Positive for appetite change and fatigue. Negative for chills, diaphoresis and fever.   HENT: Negative for congestion, rhinorrhea, sinus pain, sore throat and voice change.    Eyes: Negative for discharge and redness.   Respiratory: Positive for cough. Negative for shortness of breath and wheezing.    Cardiovascular: Positive for leg swelling. Negative for chest pain.   Gastrointestinal: Negative for abdominal  distention, abdominal pain, diarrhea, nausea and vomiting.   Endocrine: Negative for polydipsia and polyuria.   Genitourinary: Negative for dysuria, frequency and urgency.   Musculoskeletal: Positive for myalgias. Negative for back pain, neck pain and neck stiffness.   Skin: Positive for color change (RUE). Negative for pallor.   Allergic/Immunologic: Positive for immunocompromised state.   Neurological: Negative for dizziness, light-headedness and headaches.   Hematological: Negative for adenopathy. Does not bruise/bleed easily.   Psychiatric/Behavioral: Negative for agitation, confusion and decreased concentration.     Objective:   Physical Exam   Constitutional: He is oriented to person, place, and time. He appears well-developed and well-nourished. No distress.   HENT:   Head: Normocephalic and atraumatic.   Right Ear: External ear normal.   Left Ear: External ear normal.   Nose: Nose normal.   Mouth/Throat: Oropharynx is clear and moist.   Eyes: Conjunctivae and EOM are normal. Right eye exhibits no discharge. Left eye exhibits no discharge.   Neck: Normal range of motion. Neck supple. No JVD present.   Cardiovascular: Normal rate, regular rhythm and normal heart sounds. Exam reveals no gallop and no friction rub.   No murmur heard.  Pulmonary/Chest: Effort normal. He has no wheezes. He has no rhonchi. He has no rales.   Well-healed mid-line sternotomy incision   Abdominal: Soft. Bowel sounds are normal. He exhibits no distension. There is no tenderness.   Musculoskeletal: Normal range of motion. He exhibits edema (RUE with associated erythema; BLE to ankle).   Neurological: He is alert and oriented to person, place, and time.   Skin: Skin is warm and dry. He is not diaphoretic. There is erythema (RUE, improving).   Psychiatric: He has a normal mood and affect. His behavior is normal.   Nursing note and vitals reviewed.        Vital Signs (Most Recent):  Temp: 99.6 °F (37.6 °C) (03/06/19 1258)  Pulse: 94  (03/06/19 0800)  Resp: 18 (03/06/19 0800)  BP: (!) 155/96 (03/06/19 0800)  SpO2: (!) 94 % (03/06/19 0800) Vital Signs (24h Range):  Temp:  [97.7 °F (36.5 °C)-99.6 °F (37.6 °C)] 99.6 °F (37.6 °C)  Pulse:  [85-94] 94  Resp:  [18-29] 18  SpO2:  [91 %-98 %] 94 %  BP: (128-155)/(81-96) 155/96     Weight: 99.9 kg (220 lb 3.8 oz)  Body mass index is 30.72 kg/m².      Intake/Output Summary (Last 24 hours) at 3/6/2019 1432  Last data filed at 3/5/2019 1726  Gross per 24 hour   Intake 1230 ml   Output 1160 ml   Net 70 ml       Significant Labs:  CBC:  Recent Labs   Lab 03/06/19  0629   WBC 9.92   RBC 3.41*   HGB 10.1*   HCT 31.5*   *   MCV 92   MCH 29.6   MCHC 32.1     BMP:  Recent Labs   Lab 03/06/19  0629      K 3.7      CO2 25   BUN 31*   CREATININE 2.8*   CALCIUM 9.7      Tacrolimus Levels:  Recent Labs   Lab 03/06/19  0629   TACROLIMUS 4.9*     Microbiology:  Microbiology Results (last 7 days)     Procedure Component Value Units Date/Time    IV catheter culture [[id number]] Collected:  03/03/19 1502    Order Status:  Completed Specimen:  Catheter Tip, PICC Updated:  03/06/19 0933     Aerobic Culture - Cath tip No growth    Blood culture #1 **CANNOT BE ORDERED STAT** [[id number]] Collected:  03/03/19 1352    Order Status:  Completed Specimen:  Blood from Peripheral, Antecubital, Left Updated:  03/05/19 1612     Blood Culture, Routine No Growth to date     Blood Culture, Routine No Growth to date     Blood Culture, Routine No Growth to date    Blood culture #2 **CANNOT BE ORDERED STAT** [[id number]] Collected:  03/03/19 1443    Order Status:  Completed Specimen:  Blood from Peripheral, Wrist, Left Updated:  03/05/19 1612     Blood Culture, Routine No Growth to date     Blood Culture, Routine No Growth to date     Blood Culture, Routine No Growth to date    Clostridium difficile EIA [[id number]] Collected:  03/03/19 3417    Order Status:  Completed Specimen:  Stool Updated:  03/04/19 0767     C. diff Antigen  Negative     C difficile Toxins A+B, EIA Negative     Comment: Testing not recommended for children <24 months old.             I have reviewed all pertinent labs within the past 24 hours.        Assessment/Plan:     * Cellulitis of right upper extremity    Initially had a WBC of 21, which was concerning for possible bacteremia versus cellulitis from PICC line. PICC removed while in ER. Tip sent for culture. Blood cultures x2 are pending with so far NGTD. Procalcitonin significantly elevated at 11.32 at admit.  Was on IV vancomycin and cefepime, but will change Cefepime to ceftriaxone per ID recs.  Will be treated for a full 14 days.  Will likely need a PICC or [place] placed prior to discharge.  Right arm swelling is decreased and erythema is improving.             Lung replaced by transplant    Patient is s/p BLT 18 months ago secondary to sarcoidosis with associated pulmonary hypertension. 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 Last Foscarnet infusion on 3/2 PM dose.  Continue to hold tacrolimus while AVILA resolves. Continue immunosuppression with prednisone 5 mg daily.  Maintain O2 sats > 88% throughout the day. Encourage ambulation.      Immunosuppression    Current prednisone 5 mg daily.  Will continue to hold tacrolimus in setting of AVILA. Will monitor daily tacrolimus levels daily while inpatient.     Prophylactic antibiotic    Will hold Bactrim due to nephrotoxic side effects.      Type 2 diabetes mellitus with hyperglycemia, with long-term current use of insulin    Endocrine consulted, appreciate recs.     Cytomegalovirus (CMV) viremia    Started on Foscarnet IV infusions BID on 2/13/2019 and has been closely followed by ID, Dr. Lowry.  Will  continue to hold Foscarnet infusions for now while awaiting resolution of AVILA and electrolyte imbalances. Most recent CMV PCR on 3/3 was 63.  ID following, appreciate recs.         Acute kidney injury    Cr 2.8 today. Will continue to hold Foscarnet infusions until Cr and electrolyte imbalances stable.     Acute deep vein thrombosis (DVT)    Right brachial vein with partially occlusive thrombosis and small nonocclusive thrombus in the basilic vein. Occlusive thrombosis in the left greater saphenous vein also noted.  Continue apixaban 5 mg BID (started on 3/5).      Hypokalemia    Secondary to Foscarnet therapy. Continue with KCl 20 mEq daily.  Continue to monitor.     Hypomagnesemia    Secondary to Foscarnet infusions. Continue to monitor and replace as needed.           Irwin Celestin NP  Lung Transplant  [facility]

## 2019-03-06 NOTE — ASSESSMENT & PLAN NOTE
Right brachial vein with partially occlusive thrombosis and small nonocclusive thrombus in the basilic vein. Occlusive thrombosis in the left greater saphenous vein also noted.  Continue apixaban 5 mg BID (started on 3/5).

## 2019-03-07 ENCOUNTER — RESEARCH ENCOUNTER (OUTPATIENT)
Dept: RESEARCH | Facility: CLINIC | Age: 59
End: 2019-03-07
Payer: COMMERCIAL

## 2019-03-07 DIAGNOSIS — Z00.6 RESEARCH SUBJECT: ICD-10-CM

## 2019-03-07 DIAGNOSIS — Z94.2 LUNG TRANSPLANTED: Primary | ICD-10-CM

## 2019-03-07 DIAGNOSIS — B25.8 OTHER CYTOMEGALOVIRAL DISEASES: ICD-10-CM

## 2019-03-07 LAB
ALBUMIN SERPL BCP-MCNC: 2.7 G/DL
ALP SERPL-CCNC: 49 U/L
ALT SERPL W/O P-5'-P-CCNC: 35 U/L
ANION GAP SERPL CALC-SCNC: 10 MMOL/L
ANISOCYTOSIS BLD QL SMEAR: SLIGHT
AST SERPL-CCNC: 35 U/L
BASOPHILS # BLD AUTO: ABNORMAL K/UL
BASOPHILS NFR BLD: 1 %
BILIRUB SERPL-MCNC: 0.3 MG/DL
BUN SERPL-MCNC: 34 MG/DL
CA-I BLDV-SCNC: 1.26 MMOL/L
CALCIUM SERPL-MCNC: 9.8 MG/DL
CHLORIDE SERPL-SCNC: 103 MMOL/L
CO2 SERPL-SCNC: 29 MMOL/L
CREAT SERPL-MCNC: 2.5 MG/DL
DIFFERENTIAL METHOD: ABNORMAL
DOHLE BOD BLD QL SMEAR: PRESENT
DRUG STUDY SPECIMEN TYPE: NORMAL
EOSINOPHIL # BLD AUTO: ABNORMAL K/UL
EOSINOPHIL NFR BLD: 3 %
ERYTHROCYTE [DISTWIDTH] IN BLOOD BY AUTOMATED COUNT: 14.9 %
EST. GFR  (AFRICAN AMERICAN): 32 ML/MIN/1.73 M^2
EST. GFR  (NON AFRICAN AMERICAN): 27.7 ML/MIN/1.73 M^2
GLUCOSE SERPL-MCNC: 91 MG/DL
HCT VFR BLD AUTO: 27.4 %
HGB BLD-MCNC: 9.1 G/DL
IMM GRANULOCYTES # BLD AUTO: ABNORMAL K/UL
IMM GRANULOCYTES NFR BLD AUTO: ABNORMAL %
LYMPHOCYTES # BLD AUTO: ABNORMAL K/UL
LYMPHOCYTES NFR BLD: 11 %
MAGNESIUM SERPL-MCNC: 1.4 MG/DL
MCH RBC QN AUTO: 30 PG
MCHC RBC AUTO-ENTMCNC: 33.2 G/DL
MCV RBC AUTO: 90 FL
METAMYELOCYTES NFR BLD MANUAL: 2 %
MONOCYTES # BLD AUTO: ABNORMAL K/UL
MONOCYTES NFR BLD: 4 %
NEUTROPHILS NFR BLD: 78 %
NEUTS BAND NFR BLD MANUAL: 1 %
NRBC BLD-RTO: 0 /100 WBC
OVALOCYTES BLD QL SMEAR: ABNORMAL
PHOSPHATE SERPL-MCNC: 5.3 MG/DL
PLATELET # BLD AUTO: 361 K/UL
PLATELET BLD QL SMEAR: ABNORMAL
PMV BLD AUTO: 8.2 FL
POCT GLUCOSE: 120 MG/DL (ref 70–110)
POCT GLUCOSE: 160 MG/DL (ref 70–110)
POCT GLUCOSE: 161 MG/DL (ref 70–110)
POCT GLUCOSE: 93 MG/DL (ref 70–110)
POIKILOCYTOSIS BLD QL SMEAR: SLIGHT
POTASSIUM SERPL-SCNC: 3.2 MMOL/L
PROT SERPL-MCNC: 6.3 G/DL
RBC # BLD AUTO: 3.03 M/UL
SODIUM SERPL-SCNC: 142 MMOL/L
TACROLIMUS BLD-MCNC: 3.2 NG/ML
TOXIC GRANULES BLD QL SMEAR: PRESENT
WBC # BLD AUTO: 7.57 K/UL

## 2019-03-07 PROCEDURE — 85007 BL SMEAR W/DIFF WBC COUNT: CPT

## 2019-03-07 PROCEDURE — 36415 COLL VENOUS BLD VENIPUNCTURE: CPT

## 2019-03-07 PROCEDURE — 25000003 PHARM REV CODE 250: Performed by: INTERNAL MEDICINE

## 2019-03-07 PROCEDURE — 99232 SBSQ HOSP IP/OBS MODERATE 35: CPT | Mod: ,,, | Performed by: INTERNAL MEDICINE

## 2019-03-07 PROCEDURE — 85027 COMPLETE CBC AUTOMATED: CPT

## 2019-03-07 PROCEDURE — 84100 ASSAY OF PHOSPHORUS: CPT

## 2019-03-07 PROCEDURE — 63600175 PHARM REV CODE 636 W HCPCS: Performed by: NURSE PRACTITIONER

## 2019-03-07 PROCEDURE — 99232 PR SUBSEQUENT HOSPITAL CARE,LEVL II: ICD-10-PCS | Mod: ,,, | Performed by: INTERNAL MEDICINE

## 2019-03-07 PROCEDURE — 99499 UNLISTED E&M SERVICE: CPT | Mod: S$GLB,,, | Performed by: CLINICAL NURSE SPECIALIST

## 2019-03-07 PROCEDURE — 99232 PR SUBSEQUENT HOSPITAL CARE,LEVL II: ICD-10-PCS | Mod: ,,, | Performed by: NURSE PRACTITIONER

## 2019-03-07 PROCEDURE — 20600001 HC STEP DOWN PRIVATE ROOM

## 2019-03-07 PROCEDURE — 99232 SBSQ HOSP IP/OBS MODERATE 35: CPT | Mod: ,,, | Performed by: NURSE PRACTITIONER

## 2019-03-07 PROCEDURE — 80053 COMPREHEN METABOLIC PANEL: CPT

## 2019-03-07 PROCEDURE — 99499 NO LOS: ICD-10-PCS | Mod: S$GLB,,, | Performed by: CLINICAL NURSE SPECIALIST

## 2019-03-07 PROCEDURE — 25000003 PHARM REV CODE 250: Performed by: PHYSICIAN ASSISTANT

## 2019-03-07 PROCEDURE — 63600175 PHARM REV CODE 636 W HCPCS: Performed by: INTERNAL MEDICINE

## 2019-03-07 PROCEDURE — 63600175 PHARM REV CODE 636 W HCPCS: Performed by: PHYSICIAN ASSISTANT

## 2019-03-07 PROCEDURE — 82330 ASSAY OF CALCIUM: CPT

## 2019-03-07 PROCEDURE — 83735 ASSAY OF MAGNESIUM: CPT

## 2019-03-07 PROCEDURE — 80197 ASSAY OF TACROLIMUS: CPT

## 2019-03-07 PROCEDURE — 25000003 PHARM REV CODE 250: Performed by: NURSE PRACTITIONER

## 2019-03-07 PROCEDURE — 25000242 PHARM REV CODE 250 ALT 637 W/ HCPCS: Performed by: PHYSICIAN ASSISTANT

## 2019-03-07 RX ORDER — INSULIN ASPART 100 [IU]/ML
7 INJECTION, SOLUTION INTRAVENOUS; SUBCUTANEOUS
Status: DISCONTINUED | OUTPATIENT
Start: 2019-03-07 | End: 2019-03-14 | Stop reason: HOSPADM

## 2019-03-07 RX ADMIN — MAGNESIUM SULFATE IN WATER 2 G: 40 INJECTION, SOLUTION INTRAVENOUS at 03:03

## 2019-03-07 RX ADMIN — SODIUM CHLORIDE 500 ML: 0.9 INJECTION, SOLUTION INTRAVENOUS at 09:03

## 2019-03-07 RX ADMIN — FOLIC ACID 1000 MCG: 1 TABLET ORAL at 08:03

## 2019-03-07 RX ADMIN — MAGNESIUM SULFATE IN WATER 2 G: 40 INJECTION, SOLUTION INTRAVENOUS at 06:03

## 2019-03-07 RX ADMIN — POTASSIUM CHLORIDE 40 MEQ: 20 SOLUTION ORAL at 12:03

## 2019-03-07 RX ADMIN — AMLODIPINE BESYLATE 5 MG: 5 TABLET ORAL at 08:03

## 2019-03-07 RX ADMIN — INSULIN ASPART 7 UNITS: 100 INJECTION, SOLUTION INTRAVENOUS; SUBCUTANEOUS at 05:03

## 2019-03-07 RX ADMIN — APIXABAN 2.5 MG: 2.5 TABLET, FILM COATED ORAL at 08:03

## 2019-03-07 RX ADMIN — INSULIN ASPART 9 UNITS: 100 INJECTION, SOLUTION INTRAVENOUS; SUBCUTANEOUS at 08:03

## 2019-03-07 RX ADMIN — INSULIN ASPART 9 UNITS: 100 INJECTION, SOLUTION INTRAVENOUS; SUBCUTANEOUS at 12:03

## 2019-03-07 RX ADMIN — MAGNESIUM 64 MG (MAGNESIUM CHLORIDE) TABLET,DELAYED RELEASE 128 MG: at 08:03

## 2019-03-07 RX ADMIN — PREDNISONE 5 MG: 5 TABLET ORAL at 08:03

## 2019-03-07 RX ADMIN — ASPIRIN 81 MG: 81 TABLET, COATED ORAL at 08:03

## 2019-03-07 RX ADMIN — FAMOTIDINE 20 MG: 20 TABLET ORAL at 08:03

## 2019-03-07 RX ADMIN — FLUTICASONE PROPIONATE 50 MCG: 50 SPRAY, METERED NASAL at 08:03

## 2019-03-07 RX ADMIN — POTASSIUM CHLORIDE 20 MEQ: 1500 TABLET, EXTENDED RELEASE ORAL at 08:03

## 2019-03-07 RX ADMIN — PRAVASTATIN SODIUM 20 MG: 20 TABLET ORAL at 08:03

## 2019-03-07 RX ADMIN — MAGNESIUM SULFATE IN WATER 2 G: 40 INJECTION, SOLUTION INTRAVENOUS at 09:03

## 2019-03-07 RX ADMIN — VANCOMYCIN HYDROCHLORIDE 1000 MG: 1 INJECTION, POWDER, LYOPHILIZED, FOR SOLUTION INTRAVENOUS at 02:03

## 2019-03-07 RX ADMIN — CEFTRIAXONE 2 G: 2 INJECTION, SOLUTION INTRAVENOUS at 12:03

## 2019-03-07 NOTE — ASSESSMENT & PLAN NOTE
BG goal 140-180.     Decrease Levemir  To 13 units q HS - 20% reduction basal BG 03/07/2019  Was 91  Decrease Novolog to 7 units with meals (20% reduction given kidney function).   Low dose correction scale - given kidney function  BG monitoring AC/HS    Discharge planning:   Continue home insulin. However, decrease doses unit-for-unit to correlate with current hospital dosage.     Have patient follow-up with Endocrinology discharge clinic.

## 2019-03-07 NOTE — ASSESSMENT & PLAN NOTE
Cr 2.5 today. Will continue to hold Foscarnet infusions until Cr and electrolyte imbalances stable.

## 2019-03-07 NOTE — NURSING
OK to give prn mag 2g over 2 hours x2 doses per Gayle NP. NP aware that pt creatinine is 2.5 today. Will continue to monitor.

## 2019-03-07 NOTE — SUBJECTIVE & OBJECTIVE
Subjective:     Interval History: No acute events overnight.  Feels better today    Continuous Infusions:  Scheduled Meds:   amLODIPine  5 mg Oral Daily    apixaban  2.5 mg Oral BID    aspirin  81 mg Oral Daily    cefTRIAXone (ROCEPHIN) IVPB  2 g Intravenous Q24H    famotidine  20 mg Oral BID    fluticasone  1 spray Each Nare Daily    folic acid  1,000 mcg Oral Daily    insulin aspart U-100  9 Units Subcutaneous TIDWM    insulin detemir U-100  13 Units Subcutaneous QHS    magnesium chloride  128 mg Oral Daily    potassium chloride SA  20 mEq Oral Daily    pravastatin  20 mg Oral Daily    predniSONE  5 mg Oral Daily    vancomycin (VANCOCIN) IVPB (custom)  1,000 mg Intravenous Q24H     PRN Meds:acetaminophen, calcium gluconate IVPB, calcium gluconate IVPB, calcium gluconate IVPB, dextrose 50%, dextrose 50%, glucagon (human recombinant), glucose, glucose, insulin aspart U-100, magnesium sulfate IVPB **AND** magnesium sulfate IVPB, ondansetron, potassium chloride 10% **AND** potassium chloride 10% **AND** potassium chloride 10%, promethazine (PHENERGAN) IVPB    Review of patient's allergies indicates:  No Known Allergies    Review of Systems   Constitutional: Negative for chills, diaphoresis and fever.   HENT: Negative for congestion, rhinorrhea, sinus pain, sore throat and voice change.    Eyes: Negative for discharge and redness.   Respiratory: Negative for cough, shortness of breath and wheezing.    Cardiovascular: Positive for leg swelling. Negative for chest pain.   Gastrointestinal: Negative for abdominal distention, abdominal pain, diarrhea, nausea and vomiting.   Endocrine: Negative for polydipsia and polyuria.   Genitourinary: Negative for dysuria, frequency and urgency.   Musculoskeletal: Positive for myalgias. Negative for back pain, neck pain and neck stiffness.   Skin: Positive for color change (RUE). Negative for pallor.   Allergic/Immunologic: Positive for immunocompromised state.    Neurological: Negative for dizziness, light-headedness and headaches.   Hematological: Negative for adenopathy. Does not bruise/bleed easily.   Psychiatric/Behavioral: Negative for agitation, confusion and decreased concentration.     Objective:   Physical Exam   Constitutional: He is oriented to person, place, and time. He appears well-developed and well-nourished. No distress.   HENT:   Head: Normocephalic and atraumatic.   Right Ear: External ear normal.   Left Ear: External ear normal.   Nose: Nose normal.   Mouth/Throat: Oropharynx is clear and moist.   Eyes: Conjunctivae and EOM are normal. Right eye exhibits no discharge. Left eye exhibits no discharge.   Neck: Normal range of motion. Neck supple. No JVD present.   Cardiovascular: Normal rate, regular rhythm and normal heart sounds. Exam reveals no gallop and no friction rub.   No murmur heard.  Pulmonary/Chest: Effort normal. He has no wheezes. He has no rhonchi. He has no rales.   Well-healed mid-line sternotomy incision   Abdominal: Soft. Bowel sounds are normal. He exhibits no distension. There is no tenderness.   Musculoskeletal: Normal range of motion. He exhibits edema (RUE with associated erythema; BLE to ankle).   Neurological: He is alert and oriented to person, place, and time.   Skin: Skin is warm and dry. He is not diaphoretic. There is erythema (RUE, improving).   Psychiatric: He has a normal mood and affect. His behavior is normal.   Nursing note and vitals reviewed.        Vital Signs (Most Recent):  Temp: 98.4 °F (36.9 °C) (03/07/19 1201)  Pulse: 86 (03/07/19 1110)  Resp: (!) 21 (03/07/19 1110)  BP: (!) 143/96 (03/07/19 1110)  SpO2: 98 % (03/07/19 1110) Vital Signs (24h Range):  Temp:  [97.6 °F (36.4 °C)-98.4 °F (36.9 °C)] 98.4 °F (36.9 °C)  Pulse:  [78-99] 86  Resp:  [11-24] 21  SpO2:  [92 %-98 %] 98 %  BP: (138-162)/() 143/96     Weight: 99.9 kg (220 lb 3.8 oz)  Body mass index is 30.72 kg/m².      Intake/Output Summary (Last 24  hours) at 3/7/2019 1342  Last data filed at 3/7/2019 0943  Gross per 24 hour   Intake 1825 ml   Output 3075 ml   Net -1250 ml       Significant Labs:  CBC:  Recent Labs   Lab 03/07/19 0628   WBC 7.57   RBC 3.03*   HGB 9.1*   HCT 27.4*   *   MCV 90   MCH 30.0   MCHC 33.2     BMP:  Recent Labs   Lab 03/07/19 0628      K 3.2*      CO2 29   BUN 34*   CREATININE 2.5*   CALCIUM 9.8      Tacrolimus Levels:  Recent Labs   Lab 03/07/19 0628   TACROLIMUS 3.2*     Microbiology:  Microbiology Results (last 7 days)     Procedure Component Value Units Date/Time    Blood culture #2 **CANNOT BE ORDERED STAT** [[id number]] Collected:  03/03/19 1443    Order Status:  Completed Specimen:  Blood from Peripheral, Wrist, Left Updated:  03/06/19 1612     Blood Culture, Routine No Growth to date     Blood Culture, Routine No Growth to date     Blood Culture, Routine No Growth to date     Blood Culture, Routine No Growth to date    Blood culture #1 **CANNOT BE ORDERED STAT** [[id number]] Collected:  03/03/19 1352    Order Status:  Completed Specimen:  Blood from Peripheral, Antecubital, Left Updated:  03/06/19 1612     Blood Culture, Routine No Growth to date     Blood Culture, Routine No Growth to date     Blood Culture, Routine No Growth to date     Blood Culture, Routine No Growth to date    IV catheter culture [[id number]] Collected:  03/03/19 1502    Order Status:  Completed Specimen:  Catheter Tip, PICC Updated:  03/06/19 0933     Aerobic Culture - Cath tip No growth    Clostridium difficile EIA [[id number]] Collected:  03/03/19 2106    Order Status:  Completed Specimen:  Stool Updated:  03/04/19 0353     C. diff Antigen Negative     C difficile Toxins A+B, EIA Negative     Comment: Testing not recommended for children <24 months old.             I have reviewed all pertinent labs within the past 24 hours.

## 2019-03-07 NOTE — PLAN OF CARE
Problem: Adult Inpatient Plan of Care  Goal: Plan of Care Review  Outcome: Ongoing (interventions implemented as appropriate)    AAOx4. VS stable, afebrile.  L FA 22g, CDI. Denies pain.  R arm with notable swelling.  500 ml NS bolus given this am.  IV rocephin/vancomycin cont'd q24h.  Achs. Meal time and SSI given.  Mag and K replacement given.  Cr 2.5 today and trending down.  Up independently ad merlene.  Bed low and locked, call bell within reach.  Side rails x2. In NAD. Will continue to monitor.

## 2019-03-07 NOTE — PROGRESS NOTES
"[facility]  Endocrinology  Progress Note    Admit Date: 3/3/2019     Reason for Consult: Management of type 2 DM, Hyperglycemia     Surgical Procedure and Date: 2017 - Bilateral Lung Transplant     Diabetes diagnosis year:      Home Diabetes Medications:   Tresiba 20 units HS and humalog 12 units with meals  Lab Results   Component Value Date    HGBA1C 10.9 (H) 2019        How often checking glucose at home? One   BG readings on regimen: 100s  Hypoglycemia on the regimen?  no  Missed doses on regimen?  Yes about once a day - humalog missed      Diabetes Complications include:     Hyperglycemia     Complicating diabetes co morbidities:   KRISTYN and Glucocorticoid use , A-fib, AVILA, Pancreatitis, Osteopenia        HPI:   Patient is a 58 y.o. male with a diagnosis of type 2 DM on MDI. Also with sarcoidosis (s/p BLT), KRISTYN, osteopenia, and Afib. Patient presented to ED with right upper arm swelling were PICC is located; also with nausea, vomiting, and diarrhea. Endocrinology consulted for BG/ DM management.             Interval HPI:   Overnight events:  NAEON. BG is slightly below goal. Prednisone 5 mg. Cr remains elevated per chart review.     Eatin%  Nausea: No  Hypoglycemia and intervention: No  Fever: No  TPN and/or TF: No  If yes, type of TF/TPN and rate: none    BP (!) 138/93   Pulse 97   Temp 97.6 °F (36.4 °C) (Oral)   Resp 11   Ht 5' 11" (1.803 m)   Wt 99.9 kg (220 lb 3.8 oz)   SpO2 96%   BMI 30.72 kg/m²      Labs Reviewed and Include    Recent Labs   Lab 19  0628   GLU 91   CALCIUM 9.8   ALBUMIN 2.7*   PROT 6.3      K 3.2*   CO2 29      BUN 34*   CREATININE 2.5*   ALKPHOS 49*   ALT 35   AST 35   BILITOT 0.3     Lab Results   Component Value Date    WBC 7.57 2019    HGB 9.1 (L) 2019    HCT 27.4 (L) 2019    MCV 90 2019     (H) 2019     No results for input(s): TSH, FREET4 in the last 168 hours.  Lab Results "   Component Value Date    HGBA1C 10.9 (H) 02/12/2019       Nutritional status:   Body mass index is 30.72 kg/m².  Lab Results   Component Value Date    ALBUMIN 2.7 (L) 03/07/2019    ALBUMIN 3.1 (L) 03/06/2019    ALBUMIN 2.4 (L) 03/05/2019     No results found for: PREALBUMIN    Estimated Creatinine Clearance: 39.7 mL/min (A) (based on SCr of 2.5 mg/dL (H)).    Accu-Checks  Recent Labs     03/04/19  2303 03/05/19  0834 03/05/19  0902 03/05/19  1251 03/05/19  1641 03/06/19  0006 03/06/19  0822 03/06/19  1209 03/06/19  1706 03/06/19  2108   POCTGLUCOSE 125* 105 129* 154* 182* 141* 124* 101 132* 125*       Current Medications and/or Treatments Impacting Glycemic Control  Immunotherapy:    Immunosuppressants     None        Steroids:   Hormones (From admission, onward)    Start     Stop Route Frequency Ordered    03/04/19 0900  predniSONE tablet 5 mg      -- Oral Daily 03/03/19 1517        Pressors:    Autonomic Drugs (From admission, onward)    None        Hyperglycemia/Diabetes Medications:   Antihyperglycemics (From admission, onward)    Start     Stop Route Frequency Ordered    03/06/19 1645  insulin aspart U-100 pen 9 Units      -- SubQ 3 times daily with meals 03/06/19 1617    03/05/19 2100  insulin detemir U-100 pen 16 Units      -- SubQ Nightly 03/05/19 0911    03/03/19 1745  insulin aspart U-100 pen 0-5 Units      -- SubQ Before meals & nightly PRN 03/03/19 1645          ASSESSMENT and PLAN    * Cellulitis of right upper extremity    Infection may increase insulin resistance.        Type 2 diabetes mellitus with hyperglycemia, with long-term current use of insulin    BG goal 140-180.     Decrease Levemir  To 13 units q HS - 20% reduction basal BG 03/07/2019  Was 91  Decrease Novolog to 7 units with meals (20% reduction given kidney function).   Low dose correction scale - given kidney function  BG monitoring AC/HS    Discharge planning:   Continue home insulin. However, decrease doses unit-for-unit to correlate  with current hospital dosage.     Have patient follow-up with Endocrinology discharge clinic.      Cytomegalovirus (CMV) viremia    Managed per primary team  Avoid hypoglycemia         Immunosuppression    May increase insulin resistance.     Class 1 obesity due to excess calories with serious comorbidity and body mass index (BMI) of 30.0 to 30.9 in adult    may contribute to insulin resistance  Body mass index is 30.72 kg/m².             Delroy Vergara NP  Endocrinology  [facility]

## 2019-03-07 NOTE — ASSESSMENT & PLAN NOTE
Initially had a WBC of 21, which was concerning for possible bacteremia versus cellulitis from PICC line. PICC removed while in ER. Tip sent for culture. Blood cultures x2 are pending with so far NGTD. Procalcitonin significantly elevated at 11.32 at admit.  Was on IV vancomycin and cefepime, but Cefepime changed to ceftriaxone per ID recs.  Will be treated for a full 14 days.  Will likely need a PICC or [place] placed prior to discharge. Will look to have PICC placed once creatine improves.  Right arm swelling is decreased and erythema is improving.

## 2019-03-07 NOTE — NURSING
OK to give prn potassium 40mEq x1 dose per Agyle NP. NP aware pt creatinine is 2.5 today. Will continue to monitor.

## 2019-03-07 NOTE — PROGRESS NOTES
[facility]  Lung Transplant  Progress Note - Floor    Patient Name: Martin Hendrix Jr.  MRN: [id number]  Admission Date: 3/3/2019  Hospital Length of Stay: 4 days  Post-Operative Day: 562  Attending Physician: Samantha Bill DO  Primary Care Provider: Sukhi Dunham Jr, MD     Subjective:     Interval History: No acute events overnight.  Feels better today    Continuous Infusions:  Scheduled Meds:   amLODIPine  5 mg Oral Daily    apixaban  2.5 mg Oral BID    aspirin  81 mg Oral Daily    cefTRIAXone (ROCEPHIN) IVPB  2 g Intravenous Q24H    famotidine  20 mg Oral BID    fluticasone  1 spray Each Nare Daily    folic acid  1,000 mcg Oral Daily    insulin aspart U-100  9 Units Subcutaneous TIDWM    insulin detemir U-100  13 Units Subcutaneous QHS    magnesium chloride  128 mg Oral Daily    potassium chloride SA  20 mEq Oral Daily    pravastatin  20 mg Oral Daily    predniSONE  5 mg Oral Daily    vancomycin (VANCOCIN) IVPB (custom)  1,000 mg Intravenous Q24H     PRN Meds:acetaminophen, calcium gluconate IVPB, calcium gluconate IVPB, calcium gluconate IVPB, dextrose 50%, dextrose 50%, glucagon (human recombinant), glucose, glucose, insulin aspart U-100, magnesium sulfate IVPB **AND** magnesium sulfate IVPB, ondansetron, potassium chloride 10% **AND** potassium chloride 10% **AND** potassium chloride 10%, promethazine (PHENERGAN) IVPB    Review of patient's allergies indicates:  No Known Allergies    Review of Systems   Constitutional: Negative for chills, diaphoresis and fever.   HENT: Negative for congestion, rhinorrhea, sinus pain, sore throat and voice change.    Eyes: Negative for discharge and redness.   Respiratory: Negative for cough, shortness of breath and wheezing.    Cardiovascular: Positive for leg swelling. Negative for chest pain.   Gastrointestinal: Negative for abdominal distention, abdominal pain, diarrhea, nausea and vomiting.   Endocrine: Negative for polydipsia and  polyuria.   Genitourinary: Negative for dysuria, frequency and urgency.   Musculoskeletal: Positive for myalgias. Negative for back pain, neck pain and neck stiffness.   Skin: Positive for color change (RUE). Negative for pallor.   Allergic/Immunologic: Positive for immunocompromised state.   Neurological: Negative for dizziness, light-headedness and headaches.   Hematological: Negative for adenopathy. Does not bruise/bleed easily.   Psychiatric/Behavioral: Negative for agitation, confusion and decreased concentration.     Objective:   Physical Exam   Constitutional: He is oriented to person, place, and time. He appears well-developed and well-nourished. No distress.   HENT:   Head: Normocephalic and atraumatic.   Right Ear: External ear normal.   Left Ear: External ear normal.   Nose: Nose normal.   Mouth/Throat: Oropharynx is clear and moist.   Eyes: Conjunctivae and EOM are normal. Right eye exhibits no discharge. Left eye exhibits no discharge.   Neck: Normal range of motion. Neck supple. No JVD present.   Cardiovascular: Normal rate, regular rhythm and normal heart sounds. Exam reveals no gallop and no friction rub.   No murmur heard.  Pulmonary/Chest: Effort normal. He has no wheezes. He has no rhonchi. He has no rales.   Well-healed mid-line sternotomy incision   Abdominal: Soft. Bowel sounds are normal. He exhibits no distension. There is no tenderness.   Musculoskeletal: Normal range of motion. He exhibits edema (RUE with associated erythema; BLE to ankle).   Neurological: He is alert and oriented to person, place, and time.   Skin: Skin is warm and dry. He is not diaphoretic. There is erythema (RUE, improving).   Psychiatric: He has a normal mood and affect. His behavior is normal.   Nursing note and vitals reviewed.        Vital Signs (Most Recent):  Temp: 98.4 °F (36.9 °C) (03/07/19 1201)  Pulse: 86 (03/07/19 1110)  Resp: (!) 21 (03/07/19 1110)  BP: (!) 143/96 (03/07/19 1110)  SpO2: 98 % (03/07/19 1110)  Vital Signs (24h Range):  Temp:  [97.6 °F (36.4 °C)-98.4 °F (36.9 °C)] 98.4 °F (36.9 °C)  Pulse:  [78-99] 86  Resp:  [11-24] 21  SpO2:  [92 %-98 %] 98 %  BP: (138-162)/() 143/96     Weight: 99.9 kg (220 lb 3.8 oz)  Body mass index is 30.72 kg/m².      Intake/Output Summary (Last 24 hours) at 3/7/2019 1342  Last data filed at 3/7/2019 0943  Gross per 24 hour   Intake 1825 ml   Output 3075 ml   Net -1250 ml       Significant Labs:  CBC:  Recent Labs   Lab 03/07/19 0628   WBC 7.57   RBC 3.03*   HGB 9.1*   HCT 27.4*   *   MCV 90   MCH 30.0   MCHC 33.2     BMP:  Recent Labs   Lab 03/07/19 0628      K 3.2*      CO2 29   BUN 34*   CREATININE 2.5*   CALCIUM 9.8      Tacrolimus Levels:  Recent Labs   Lab 03/07/19 0628   TACROLIMUS 3.2*     Microbiology:  Microbiology Results (last 7 days)     Procedure Component Value Units Date/Time    Blood culture #2 **CANNOT BE ORDERED STAT** [[id number]] Collected:  03/03/19 1443    Order Status:  Completed Specimen:  Blood from Peripheral, Wrist, Left Updated:  03/06/19 1612     Blood Culture, Routine No Growth to date     Blood Culture, Routine No Growth to date     Blood Culture, Routine No Growth to date     Blood Culture, Routine No Growth to date    Blood culture #1 **CANNOT BE ORDERED STAT** [[id number]] Collected:  03/03/19 1352    Order Status:  Completed Specimen:  Blood from Peripheral, Antecubital, Left Updated:  03/06/19 1612     Blood Culture, Routine No Growth to date     Blood Culture, Routine No Growth to date     Blood Culture, Routine No Growth to date     Blood Culture, Routine No Growth to date    IV catheter culture [[id number]] Collected:  03/03/19 1502    Order Status:  Completed Specimen:  Catheter Tip, PICC Updated:  03/06/19 0933     Aerobic Culture - Cath tip No growth    Clostridium difficile EIA [[id number]] Collected:  03/03/19 2106    Order Status:  Completed Specimen:  Stool Updated:  03/04/19 0353     C. diff Antigen Negative      C difficile Toxins A+B, EIA Negative     Comment: Testing not recommended for children <24 months old.             I have reviewed all pertinent labs within the past 24 hours.      Assessment/Plan:     * Cellulitis of right upper extremity    Initially had a WBC of 21, which was concerning for possible bacteremia versus cellulitis from PICC line. PICC removed while in ER. Tip sent for culture. Blood cultures x2 are pending with so far NGTD. Procalcitonin significantly elevated at 11.32 at admit.  Was on IV vancomycin and cefepime, but Cefepime changed to ceftriaxone per ID recs.  Will be treated for a full 14 days.  Will likely need a PICC or [place] placed prior to discharge. Will look to have PICC placed once creatine improves.  Right arm swelling is decreased and erythema is improving.             Lung replaced by transplant    Patient is s/p BLT 18 months ago secondary to sarcoidosis with associated pulmonary hypertension. 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 Last Foscarnet infusion on 3/2 PM dose.  Continue to hold tacrolimus while AVILA resolves. Continue immunosuppression with prednisone 5 mg daily.  Maintain O2 sats > 88% throughout the day. Encourage ambulation.      Immunosuppression    Current prednisone 5 mg daily.  Will continue to hold tacrolimus in setting of AVILA. Will monitor daily tacrolimus levels daily while inpatient.     Prophylactic antibiotic    Will hold Bactrim due to nephrotoxic side effects.      Type 2 diabetes mellitus with hyperglycemia, with long-term current use of insulin    Endocrine consulted, appreciate recs.     Cytomegalovirus (CMV) viremia    Started on Foscarnet IV infusions BID on 2/13/2019 and has been  closely followed by ID, Dr. Lowry.  Will continue to hold Foscarnet infusions for now while awaiting resolution of AVILA and electrolyte imbalances. Most recent CMV PCR on 3/3 was 63.  ID following, appreciate recs.         Acute kidney injury    Cr 2.5 today. Will continue to hold Foscarnet infusions until Cr and electrolyte imbalances stable.     Acute deep vein thrombosis (DVT)    Right brachial vein with partially occlusive thrombosis and small nonocclusive thrombus in the basilic vein. Occlusive thrombosis in the left greater saphenous vein also noted.  Continue apixaban 5 mg BID (started on 3/5).      Hypokalemia    Secondary to Foscarnet therapy. Continue with KCl 20 mEq daily.  Continue to monitor.     Hypomagnesemia    Secondary to Foscarnet infusions. Continue to monitor and replace as needed.           Irwin Celestin NP  Lung Transplant  [facility]

## 2019-03-07 NOTE — SUBJECTIVE & OBJECTIVE
"Interval HPI:   Overnight events:  NAEON. BG is slightly below goal. Prednisone 5 mg. Cr remains elevated per chart review.     Eatin%  Nausea: No  Hypoglycemia and intervention: No  Fever: No  TPN and/or TF: No  If yes, type of TF/TPN and rate: none    BP (!) 138/93   Pulse 97   Temp 97.6 °F (36.4 °C) (Oral)   Resp 11   Ht 5' 11" (1.803 m)   Wt 99.9 kg (220 lb 3.8 oz)   SpO2 96%   BMI 30.72 kg/m²     Labs Reviewed and Include    Recent Labs   Lab 19  0628   GLU 91   CALCIUM 9.8   ALBUMIN 2.7*   PROT 6.3      K 3.2*   CO2 29      BUN 34*   CREATININE 2.5*   ALKPHOS 49*   ALT 35   AST 35   BILITOT 0.3     Lab Results   Component Value Date    WBC 7.57 2019    HGB 9.1 (L) 2019    HCT 27.4 (L) 2019    MCV 90 2019     (H) 2019     No results for input(s): TSH, FREET4 in the last 168 hours.  Lab Results   Component Value Date    HGBA1C 10.9 (H) 2019       Nutritional status:   Body mass index is 30.72 kg/m².  Lab Results   Component Value Date    ALBUMIN 2.7 (L) 2019    ALBUMIN 3.1 (L) 2019    ALBUMIN 2.4 (L) 2019     No results found for: PREALBUMIN    Estimated Creatinine Clearance: 39.7 mL/min (A) (based on SCr of 2.5 mg/dL (H)).    Accu-Checks  Recent Labs     19  2303 19  0834 19  0902 19  1251 19  1641 19  0006 19  0822 19  1209 19  1706 19  2108   POCTGLUCOSE 125* 105 129* 154* 182* 141* 124* 101 132* 125*       Current Medications and/or Treatments Impacting Glycemic Control  Immunotherapy:    Immunosuppressants     None        Steroids:   Hormones (From admission, onward)    Start     Stop Route Frequency Ordered    19 0900  predniSONE tablet 5 mg      -- Oral Daily 19 1517        Pressors:    Autonomic Drugs (From admission, onward)    None        Hyperglycemia/Diabetes Medications:   Antihyperglycemics (From admission, onward)    Start     Stop " Route Frequency Ordered    03/06/19 1645  insulin aspart U-100 pen 9 Units      -- SubQ 3 times daily with meals 03/06/19 1617    03/05/19 2100  insulin detemir U-100 pen 16 Units      -- SubQ Nightly 03/05/19 0911    03/03/19 1745  insulin aspart U-100 pen 0-5 Units      -- SubQ Before meals & nightly PRN 03/03/19 1647

## 2019-03-08 LAB
ALBUMIN SERPL BCP-MCNC: 3.1 G/DL
ALP SERPL-CCNC: 60 U/L
ALT SERPL W/O P-5'-P-CCNC: 34 U/L
ANION GAP SERPL CALC-SCNC: 12 MMOL/L
ANISOCYTOSIS BLD QL SMEAR: SLIGHT
AST SERPL-CCNC: 27 U/L
BACTERIA BLD CULT: NORMAL
BACTERIA BLD CULT: NORMAL
BASOPHILS NFR BLD: 2 %
BILIRUB SERPL-MCNC: 0.4 MG/DL
BUN SERPL-MCNC: 35 MG/DL
CA-I BLDV-SCNC: 1.21 MMOL/L
CALCIUM SERPL-MCNC: 10.8 MG/DL
CHLORIDE SERPL-SCNC: 103 MMOL/L
CO2 SERPL-SCNC: 27 MMOL/L
CREAT SERPL-MCNC: 2.4 MG/DL
DIFFERENTIAL METHOD: ABNORMAL
EOSINOPHIL NFR BLD: 2 %
ERYTHROCYTE [DISTWIDTH] IN BLOOD BY AUTOMATED COUNT: 15.1 %
EST. GFR  (AFRICAN AMERICAN): 33.6 ML/MIN/1.73 M^2
EST. GFR  (NON AFRICAN AMERICAN): 29.1 ML/MIN/1.73 M^2
GLUCOSE SERPL-MCNC: 114 MG/DL
HCT VFR BLD AUTO: 32.3 %
HGB BLD-MCNC: 10.3 G/DL
IMM GRANULOCYTES # BLD AUTO: ABNORMAL K/UL
IMM GRANULOCYTES NFR BLD AUTO: ABNORMAL %
LYMPHOCYTES NFR BLD: 4 %
MAGNESIUM SERPL-MCNC: 1.8 MG/DL
MCH RBC QN AUTO: 30.7 PG
MCHC RBC AUTO-ENTMCNC: 31.9 G/DL
MCV RBC AUTO: 96 FL
METAMYELOCYTES NFR BLD MANUAL: 1 %
MONOCYTES NFR BLD: 9 %
NEUTROPHILS NFR BLD: 78 %
NEUTS BAND NFR BLD MANUAL: 4 %
NRBC BLD-RTO: 0 /100 WBC
OVALOCYTES BLD QL SMEAR: ABNORMAL
PHOSPHATE SERPL-MCNC: 5.5 MG/DL
PLATELET # BLD AUTO: 450 K/UL
PMV BLD AUTO: 8.6 FL
POCT GLUCOSE: 120 MG/DL (ref 70–110)
POCT GLUCOSE: 127 MG/DL (ref 70–110)
POCT GLUCOSE: 169 MG/DL (ref 70–110)
POCT GLUCOSE: 193 MG/DL (ref 70–110)
POIKILOCYTOSIS BLD QL SMEAR: SLIGHT
POLYCHROMASIA BLD QL SMEAR: ABNORMAL
POTASSIUM SERPL-SCNC: 4 MMOL/L
PROT SERPL-MCNC: 7 G/DL
RBC # BLD AUTO: 3.36 M/UL
SODIUM SERPL-SCNC: 142 MMOL/L
TACROLIMUS BLD-MCNC: 2.3 NG/ML
WBC # BLD AUTO: 10.47 K/UL

## 2019-03-08 PROCEDURE — 25000003 PHARM REV CODE 250: Performed by: PHYSICIAN ASSISTANT

## 2019-03-08 PROCEDURE — 85007 BL SMEAR W/DIFF WBC COUNT: CPT

## 2019-03-08 PROCEDURE — 25000003 PHARM REV CODE 250: Performed by: INTERNAL MEDICINE

## 2019-03-08 PROCEDURE — 63600175 PHARM REV CODE 636 W HCPCS: Performed by: NURSE PRACTITIONER

## 2019-03-08 PROCEDURE — 84100 ASSAY OF PHOSPHORUS: CPT

## 2019-03-08 PROCEDURE — 36415 COLL VENOUS BLD VENIPUNCTURE: CPT

## 2019-03-08 PROCEDURE — 20600001 HC STEP DOWN PRIVATE ROOM

## 2019-03-08 PROCEDURE — 80053 COMPREHEN METABOLIC PANEL: CPT

## 2019-03-08 PROCEDURE — 63600175 PHARM REV CODE 636 W HCPCS: Performed by: PHYSICIAN ASSISTANT

## 2019-03-08 PROCEDURE — 99231 PR SUBSEQUENT HOSPITAL CARE,LEVL I: ICD-10-PCS | Mod: ,,, | Performed by: NURSE PRACTITIONER

## 2019-03-08 PROCEDURE — 99231 SBSQ HOSP IP/OBS SF/LOW 25: CPT | Mod: ,,, | Performed by: NURSE PRACTITIONER

## 2019-03-08 PROCEDURE — 82330 ASSAY OF CALCIUM: CPT

## 2019-03-08 PROCEDURE — 25000003 PHARM REV CODE 250: Performed by: NURSE PRACTITIONER

## 2019-03-08 PROCEDURE — 94761 N-INVAS EAR/PLS OXIMETRY MLT: CPT

## 2019-03-08 PROCEDURE — 83735 ASSAY OF MAGNESIUM: CPT

## 2019-03-08 PROCEDURE — 99232 SBSQ HOSP IP/OBS MODERATE 35: CPT | Mod: ,,, | Performed by: NURSE PRACTITIONER

## 2019-03-08 PROCEDURE — 99232 PR SUBSEQUENT HOSPITAL CARE,LEVL II: ICD-10-PCS | Mod: ,,, | Performed by: NURSE PRACTITIONER

## 2019-03-08 PROCEDURE — 80197 ASSAY OF TACROLIMUS: CPT

## 2019-03-08 PROCEDURE — 85027 COMPLETE CBC AUTOMATED: CPT

## 2019-03-08 RX ADMIN — PRAVASTATIN SODIUM 20 MG: 20 TABLET ORAL at 08:03

## 2019-03-08 RX ADMIN — FLUTICASONE PROPIONATE 50 MCG: 50 SPRAY, METERED NASAL at 08:03

## 2019-03-08 RX ADMIN — FOLIC ACID 1000 MCG: 1 TABLET ORAL at 08:03

## 2019-03-08 RX ADMIN — FAMOTIDINE 20 MG: 20 TABLET ORAL at 08:03

## 2019-03-08 RX ADMIN — ASPIRIN 81 MG: 81 TABLET, COATED ORAL at 08:03

## 2019-03-08 RX ADMIN — VANCOMYCIN HYDROCHLORIDE 1000 MG: 1 INJECTION, POWDER, LYOPHILIZED, FOR SOLUTION INTRAVENOUS at 02:03

## 2019-03-08 RX ADMIN — SODIUM CHLORIDE 500 ML: 0.9 INJECTION, SOLUTION INTRAVENOUS at 11:03

## 2019-03-08 RX ADMIN — INSULIN ASPART 7 UNITS: 100 INJECTION, SOLUTION INTRAVENOUS; SUBCUTANEOUS at 04:03

## 2019-03-08 RX ADMIN — CEFTRIAXONE 2 G: 2 INJECTION, SOLUTION INTRAVENOUS at 04:03

## 2019-03-08 RX ADMIN — AMLODIPINE BESYLATE 5 MG: 5 TABLET ORAL at 08:03

## 2019-03-08 RX ADMIN — MAGNESIUM 64 MG (MAGNESIUM CHLORIDE) TABLET,DELAYED RELEASE 128 MG: at 08:03

## 2019-03-08 RX ADMIN — INSULIN ASPART 7 UNITS: 100 INJECTION, SOLUTION INTRAVENOUS; SUBCUTANEOUS at 08:03

## 2019-03-08 RX ADMIN — APIXABAN 2.5 MG: 2.5 TABLET, FILM COATED ORAL at 08:03

## 2019-03-08 RX ADMIN — POTASSIUM CHLORIDE 20 MEQ: 1500 TABLET, EXTENDED RELEASE ORAL at 08:03

## 2019-03-08 RX ADMIN — PREDNISONE 5 MG: 5 TABLET ORAL at 08:03

## 2019-03-08 RX ADMIN — INSULIN ASPART 7 UNITS: 100 INJECTION, SOLUTION INTRAVENOUS; SUBCUTANEOUS at 11:03

## 2019-03-08 NOTE — ASSESSMENT & PLAN NOTE
Initially had a WBC of 21, which was concerning for possible bacteremia versus cellulitis from PICC line. PICC removed while in ER. Tip sent for culture with NGTD. Blood cultures x2 with NGTD. Procalcitonin significantly elevated at 11.32 at admit.  Was on IV vancomycin and cefepime, but Cefepime changed to ceftriaxone per ID recs.  Will be treated for a full 14 days.  Will likely need a PICC placed prior to discharge. Will look to have PICC placed once creatine improves. Will give 500 mL normal saline bolus today. Right arm swelling is decreased and erythema has resolved.

## 2019-03-08 NOTE — PLAN OF CARE
Problem: Adult Inpatient Plan of Care  Goal: Plan of Care Review  Outcome: Ongoing (interventions implemented as appropriate)    AAOx4. VS stable, afebrile.  L FA 22g, CDI. Denies pain.  R arm swelling decreasing.  500 ml NS bolus given again this am.  Cr at 2.4 and trending down.  IV rocephin/vancomycin cont'd q24h.  Achs. Meal time and SSI given.  Up independently ad merlene. No further complaints.  Bed low and locked, call bell within reach.  Side rails x2. In NAD. Will continue to monitor.

## 2019-03-08 NOTE — PROGRESS NOTES
SW rounded on pt today while on TSU.  Pt was stating up walking around and presented as AAOX4.  Pt not sheduled to dc over the weekend.  Pt states coping appropriately at this time with current ongoing medical issues.  Pt denies any issues or concerns at this time.  SW will continue to provide psychosocial support, education, resources and assistance with needs as appropriate

## 2019-03-08 NOTE — SUBJECTIVE & OBJECTIVE
Subjective:     Interval History: No acute events overnight.  MAKSIM looks much improved.  Still with some edema, but erythema has resolved.      Continuous Infusions:  Scheduled Meds:   amLODIPine  5 mg Oral Daily    apixaban  2.5 mg Oral BID    aspirin  81 mg Oral Daily    cefTRIAXone (ROCEPHIN) IVPB  2 g Intravenous Q24H    famotidine  20 mg Oral BID    fluticasone  1 spray Each Nare Daily    folic acid  1,000 mcg Oral Daily    insulin aspart U-100  7 Units Subcutaneous TIDWM    insulin detemir U-100  13 Units Subcutaneous QHS    magnesium chloride  128 mg Oral Daily    potassium chloride SA  20 mEq Oral Daily    pravastatin  20 mg Oral Daily    predniSONE  5 mg Oral Daily    sodium chloride 0.9%  500 mL Intravenous Once    vancomycin (VANCOCIN) IVPB (custom)  1,000 mg Intravenous Q24H     PRN Meds:acetaminophen, calcium gluconate IVPB, calcium gluconate IVPB, calcium gluconate IVPB, dextrose 50%, dextrose 50%, glucagon (human recombinant), glucose, glucose, insulin aspart U-100, magnesium sulfate IVPB **AND** magnesium sulfate IVPB, ondansetron, potassium chloride 10% **AND** potassium chloride 10% **AND** potassium chloride 10%, promethazine (PHENERGAN) IVPB    Review of patient's allergies indicates:  No Known Allergies    Review of Systems   Constitutional: Negative for chills, diaphoresis and fever.   HENT: Negative for congestion, rhinorrhea, sinus pain, sore throat and voice change.    Eyes: Negative for discharge and redness.   Respiratory: Negative for cough, shortness of breath and wheezing.    Cardiovascular: Positive for leg swelling. Negative for chest pain.   Gastrointestinal: Negative for abdominal distention, abdominal pain, diarrhea, nausea and vomiting.   Endocrine: Negative for polydipsia and polyuria.   Genitourinary: Negative for dysuria, frequency and urgency.   Musculoskeletal: Positive for myalgias. Negative for back pain, neck pain and neck stiffness.   Skin: Positive for  color change (RUE). Negative for pallor.   Allergic/Immunologic: Positive for immunocompromised state.   Neurological: Negative for dizziness, light-headedness and headaches.   Hematological: Negative for adenopathy. Does not bruise/bleed easily.   Psychiatric/Behavioral: Negative for agitation, confusion and decreased concentration.     Objective:   Physical Exam   Constitutional: He is oriented to person, place, and time. He appears well-developed and well-nourished. No distress.   HENT:   Head: Normocephalic and atraumatic.   Right Ear: External ear normal.   Left Ear: External ear normal.   Nose: Nose normal.   Mouth/Throat: Oropharynx is clear and moist.   Eyes: Conjunctivae and EOM are normal. Right eye exhibits no discharge. Left eye exhibits no discharge.   Neck: Normal range of motion. Neck supple. No JVD present.   Cardiovascular: Normal rate, regular rhythm and normal heart sounds. Exam reveals no gallop and no friction rub.   No murmur heard.  Pulmonary/Chest: Effort normal. He has no wheezes. He has no rhonchi. He has no rales.   Well-healed mid-line sternotomy incision   Abdominal: Soft. Bowel sounds are normal. He exhibits no distension. There is no tenderness.   Musculoskeletal: Normal range of motion. He exhibits edema (RUE much improved).   Neurological: He is alert and oriented to person, place, and time.   Skin: Skin is warm and dry. He is not diaphoretic.   Psychiatric: He has a normal mood and affect. His behavior is normal.   Nursing note and vitals reviewed.        Vital Signs (Most Recent):  Temp: 97.6 °F (36.4 °C) (03/08/19 0843)  Pulse: 97 (03/08/19 1023)  Resp: 16 (03/08/19 1023)  BP: (!) 135/90 (03/08/19 1023)  SpO2: (!) 94 % (03/08/19 1023) Vital Signs (24h Range):  Temp:  [97.6 °F (36.4 °C)-98.4 °F (36.9 °C)] 97.6 °F (36.4 °C)  Pulse:  [76-97] 97  Resp:  [16-26] 16  SpO2:  [94 %-99 %] 94 %  BP: (135-167)/(82-99) 135/90     Weight: 103.1 kg (227 lb 4.7 oz)  Body mass index is 31.7  kg/m².      Intake/Output Summary (Last 24 hours) at 3/8/2019 1050  Last data filed at 3/8/2019 0900  Gross per 24 hour   Intake 1910 ml   Output 520 ml   Net 1390 ml       Significant Labs:  CBC:  Recent Labs   Lab 03/08/19  0704   WBC 10.47   RBC 3.36*   HGB 10.3*   HCT 32.3*   *   MCV 96   MCH 30.7   MCHC 31.9*     BMP:  Recent Labs   Lab 03/08/19  0705      K 4.0      CO2 27   BUN 35*   CREATININE 2.4*   CALCIUM 10.8*      Tacrolimus Levels:  Recent Labs   Lab 03/07/19  0628   TACROLIMUS 3.2*     Microbiology:  Microbiology Results (last 7 days)     Procedure Component Value Units Date/Time    Blood culture #2 **CANNOT BE ORDERED STAT** [[id number]] Collected:  03/03/19 1443    Order Status:  Completed Specimen:  Blood from Peripheral, Wrist, Left Updated:  03/07/19 1612     Blood Culture, Routine No Growth to date     Blood Culture, Routine No Growth to date     Blood Culture, Routine No Growth to date     Blood Culture, Routine No Growth to date     Blood Culture, Routine No Growth to date    Blood culture #1 **CANNOT BE ORDERED STAT** [[id number]] Collected:  03/03/19 1352    Order Status:  Completed Specimen:  Blood from Peripheral, Antecubital, Left Updated:  03/07/19 1612     Blood Culture, Routine No Growth to date     Blood Culture, Routine No Growth to date     Blood Culture, Routine No Growth to date     Blood Culture, Routine No Growth to date     Blood Culture, Routine No Growth to date    IV catheter culture [[id number]] Collected:  03/03/19 1502    Order Status:  Completed Specimen:  Catheter Tip, PICC Updated:  03/06/19 0933     Aerobic Culture - Cath tip No growth    Clostridium difficile EIA [[id number]] Collected:  03/03/19 2106    Order Status:  Completed Specimen:  Stool Updated:  03/04/19 0353     C. diff Antigen Negative     C difficile Toxins A+B, EIA Negative     Comment: Testing not recommended for children <24 months old.             I have reviewed all pertinent labs  within the past 24 hours.

## 2019-03-08 NOTE — PLAN OF CARE
Problem: Adult Inpatient Plan of Care  Goal: Plan of Care Review  Outcome: Ongoing (interventions implemented as appropriate)  -Pt AAOx4  -Vital signs stable  -BG monitoring ACHS; BG HS was 160-no SSI given  -IV vanc and reocephin continued per order  -Cr today: 2.5 and trending down  -Fall precautions maintained, non skid socks worn  -No acute events/falls/injuries this shift    -Bed lowered, locked, siderails up x2, and call bell in reach.    See flowsheet for assessment findings.  Will continue to monitor pt.

## 2019-03-08 NOTE — PROGRESS NOTES
Subjective:      Patient ID: Martin Hendrix Jr. is a 58 y.o. male.    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 Martin Hendrix presents to clinic for Visit 14/Week 12 for Roman CMV treatment study. Patient is now in follow up phase of study. Received Maribavir 400mg po BID for 8 weeks as per study protocol that completed on 2/6/19.      CMV viral load had been less than <35 on 1/18/19, but then began increasing again 2/1/19. Patient was admitted to hospital on 2/12/19 to begin treatment for CMV viremia with Foscarnet. First dose given 2/12/19.     On 3/3/19 patient noted swelling of right arm at site of PICC line and was directed by the LUT team to come to the ED for assessment. Patient also report vomiting, diarrhea and fatigue over the past few days. PICC line site appeared infected, WBC-25. CMP with several electrolyte imbalances and AVILA with creatinine of 2.6. Patient admitted with cellulitis of RUE. Blood cultures NGTD. Patient currently receiving Vancomycin and Ceftriaxone as treatment. Patient given IVF, potassium and magnesium replacement. AVILA likely due to Foscarnet. Foscarent discontinued, last dose 3/2/19. Last CMV viral load on 3/3/19 was 63. ID to follow. Tacrolimus and Bactrim on hold as they are also nephrotoxic. Ultrasound of upper extremities revealed DVT, patient started on Apixaban. Patient currently inpatient, visit conducted in room.      No episodes of rejection assessed, no CMV invasive disease or CMV syndrome assessed. Denies fever, chills.    Review of Systems   Constitution: Positive for malaise/fatigue. Negative for chills and fever.   HENT: Negative for congestion.    Cardiovascular: Negative for chest pain.   Respiratory: Negative for shortness of breath.    Skin: Negative for rash.    Musculoskeletal: Negative for joint pain.   Gastrointestinal: Negative for diarrhea, nausea and vomiting.   Psychiatric/Behavioral: Negative for altered mental status.     Objective:   Physical Exam   Constitutional: He is oriented to person, place, and time. He appears well-developed and well-nourished.   HENT:   Head: Normocephalic and atraumatic.   Eyes: Conjunctivae are normal.   Neck: Normal range of motion.   Pulmonary/Chest: Effort normal. No respiratory distress.   Musculoskeletal: Normal range of motion. He exhibits edema.   RUE edema   Neurological: He is alert and oriented to person, place, and time.   Skin: Skin is warm and dry.   Psychiatric: He has a normal mood and affect. His behavior is normal. Judgment and thought content normal.   Vitals reviewed.    Assessment:       1. Lung transplanted    2. Other cytomegaloviral diseases    3. Research subject          Plan:       Plan:  1. Study labs drawn and processed per protocol.  2. Next research study visit with labs at visit on 03/22/2019.   3. Call clinic with any questions/concerns.

## 2019-03-08 NOTE — ASSESSMENT & PLAN NOTE
Cr 2.4 today. Will continue to hold Foscarnet infusions until Cr and electrolyte imbalances stable.  Will give 500 mL normal saline bolus.

## 2019-03-08 NOTE — PROGRESS NOTES
[facility]  Lung Transplant  Progress Note - Floor    Patient Name: Martin Hendrix Jr.  MRN: [id number]  Admission Date: 3/3/2019  Hospital Length of Stay: 5 days  Post-Operative Day: 563  Attending Physician: Samantha Bill DO  Primary Care Provider: Sukhi Dunham Jr, MD     Subjective:     Interval History: No acute events overnight.  RUE looks much improved.  Still with some edema, but erythema has resolved.      Continuous Infusions:  Scheduled Meds:   amLODIPine  5 mg Oral Daily    apixaban  2.5 mg Oral BID    aspirin  81 mg Oral Daily    cefTRIAXone (ROCEPHIN) IVPB  2 g Intravenous Q24H    famotidine  20 mg Oral BID    fluticasone  1 spray Each Nare Daily    folic acid  1,000 mcg Oral Daily    insulin aspart U-100  7 Units Subcutaneous TIDWM    insulin detemir U-100  13 Units Subcutaneous QHS    magnesium chloride  128 mg Oral Daily    potassium chloride SA  20 mEq Oral Daily    pravastatin  20 mg Oral Daily    predniSONE  5 mg Oral Daily    sodium chloride 0.9%  500 mL Intravenous Once    vancomycin (VANCOCIN) IVPB (custom)  1,000 mg Intravenous Q24H     PRN Meds:acetaminophen, calcium gluconate IVPB, calcium gluconate IVPB, calcium gluconate IVPB, dextrose 50%, dextrose 50%, glucagon (human recombinant), glucose, glucose, insulin aspart U-100, magnesium sulfate IVPB **AND** magnesium sulfate IVPB, ondansetron, potassium chloride 10% **AND** potassium chloride 10% **AND** potassium chloride 10%, promethazine (PHENERGAN) IVPB    Review of patient's allergies indicates:  No Known Allergies    Review of Systems   Constitutional: Negative for chills, diaphoresis and fever.   HENT: Negative for congestion, rhinorrhea, sinus pain, sore throat and voice change.    Eyes: Negative for discharge and redness.   Respiratory: Negative for cough, shortness of breath and wheezing.    Cardiovascular: Positive for leg swelling. Negative for chest pain.   Gastrointestinal: Negative for  abdominal distention, abdominal pain, diarrhea, nausea and vomiting.   Endocrine: Negative for polydipsia and polyuria.   Genitourinary: Negative for dysuria, frequency and urgency.   Musculoskeletal: Positive for myalgias. Negative for back pain, neck pain and neck stiffness.   Skin: Positive for color change (RUE). Negative for pallor.   Allergic/Immunologic: Positive for immunocompromised state.   Neurological: Negative for dizziness, light-headedness and headaches.   Hematological: Negative for adenopathy. Does not bruise/bleed easily.   Psychiatric/Behavioral: Negative for agitation, confusion and decreased concentration.     Objective:   Physical Exam   Constitutional: He is oriented to person, place, and time. He appears well-developed and well-nourished. No distress.   HENT:   Head: Normocephalic and atraumatic.   Right Ear: External ear normal.   Left Ear: External ear normal.   Nose: Nose normal.   Mouth/Throat: Oropharynx is clear and moist.   Eyes: Conjunctivae and EOM are normal. Right eye exhibits no discharge. Left eye exhibits no discharge.   Neck: Normal range of motion. Neck supple. No JVD present.   Cardiovascular: Normal rate, regular rhythm and normal heart sounds. Exam reveals no gallop and no friction rub.   No murmur heard.  Pulmonary/Chest: Effort normal. He has no wheezes. He has no rhonchi. He has no rales.   Well-healed mid-line sternotomy incision   Abdominal: Soft. Bowel sounds are normal. He exhibits no distension. There is no tenderness.   Musculoskeletal: Normal range of motion. He exhibits edema (RUE much improved).   Neurological: He is alert and oriented to person, place, and time.   Skin: Skin is warm and dry. He is not diaphoretic.   Psychiatric: He has a normal mood and affect. His behavior is normal.   Nursing note and vitals reviewed.        Vital Signs (Most Recent):  Temp: 97.6 °F (36.4 °C) (03/08/19 0843)  Pulse: 97 (03/08/19 1023)  Resp: 16 (03/08/19 1023)  BP: (!) 135/90  (03/08/19 1023)  SpO2: (!) 94 % (03/08/19 1023) Vital Signs (24h Range):  Temp:  [97.6 °F (36.4 °C)-98.4 °F (36.9 °C)] 97.6 °F (36.4 °C)  Pulse:  [76-97] 97  Resp:  [16-26] 16  SpO2:  [94 %-99 %] 94 %  BP: (135-167)/(82-99) 135/90     Weight: 103.1 kg (227 lb 4.7 oz)  Body mass index is 31.7 kg/m².      Intake/Output Summary (Last 24 hours) at 3/8/2019 1050  Last data filed at 3/8/2019 0900  Gross per 24 hour   Intake 1910 ml   Output 520 ml   Net 1390 ml       Significant Labs:  CBC:  Recent Labs   Lab 03/08/19  0704   WBC 10.47   RBC 3.36*   HGB 10.3*   HCT 32.3*   *   MCV 96   MCH 30.7   MCHC 31.9*     BMP:  Recent Labs   Lab 03/08/19  0705      K 4.0      CO2 27   BUN 35*   CREATININE 2.4*   CALCIUM 10.8*      Tacrolimus Levels:  Recent Labs   Lab 03/07/19  0628   TACROLIMUS 3.2*     Microbiology:  Microbiology Results (last 7 days)     Procedure Component Value Units Date/Time    Blood culture #2 **CANNOT BE ORDERED STAT** [[id number]] Collected:  03/03/19 1443    Order Status:  Completed Specimen:  Blood from Peripheral, Wrist, Left Updated:  03/07/19 1612     Blood Culture, Routine No Growth to date     Blood Culture, Routine No Growth to date     Blood Culture, Routine No Growth to date     Blood Culture, Routine No Growth to date     Blood Culture, Routine No Growth to date    Blood culture #1 **CANNOT BE ORDERED STAT** [[id number]] Collected:  03/03/19 1352    Order Status:  Completed Specimen:  Blood from Peripheral, Antecubital, Left Updated:  03/07/19 1612     Blood Culture, Routine No Growth to date     Blood Culture, Routine No Growth to date     Blood Culture, Routine No Growth to date     Blood Culture, Routine No Growth to date     Blood Culture, Routine No Growth to date    IV catheter culture [[id number]] Collected:  03/03/19 1502    Order Status:  Completed Specimen:  Catheter Tip, PICC Updated:  03/06/19 0933     Aerobic Culture - Cath tip No growth    Clostridium difficile EIA  [[id number]] Collected:  03/03/19 2106    Order Status:  Completed Specimen:  Stool Updated:  03/04/19 7750     C. diff Antigen Negative     C difficile Toxins A+B, EIA Negative     Comment: Testing not recommended for children <24 months old.             I have reviewed all pertinent labs within the past 24 hours.        Assessment/Plan:     * Cellulitis of right upper extremity    Initially had a WBC of 21, which was concerning for possible bacteremia versus cellulitis from PICC line. PICC removed while in ER. Tip sent for culture with NGTD. Blood cultures x2 with NGTD. Procalcitonin significantly elevated at 11.32 at admit.  Was on IV vancomycin and cefepime, but Cefepime changed to ceftriaxone per ID recs.  Will be treated for a full 14 days.  Will likely need a PICC placed prior to discharge. Will look to have PICC placed once creatine improves. Will give 500 mL normal saline bolus today. Right arm swelling is decreased and erythema has resolved.               Lung replaced by transplant    Patient is s/p BLT 18 months ago secondary to sarcoidosis with associated pulmonary hypertension. 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 Last Foscarnet infusion on 3/2 PM dose.  Continue to hold tacrolimus while AVILA resolves. Continue immunosuppression with prednisone 5 mg daily.  Maintain O2 sats > 88% throughout the day. Encourage ambulation.      Immunosuppression    Current prednisone 5 mg daily.  Will continue to hold tacrolimus in setting of AVILA. Will monitor daily tacrolimus levels daily while inpatient.     Prophylactic antibiotic    Will hold Bactrim due to nephrotoxic side effects.      Type 2 diabetes mellitus with hyperglycemia, with long-term  current use of insulin    Endocrine consulted, appreciate recs.     Cytomegalovirus (CMV) viremia    Started on Foscarnet IV infusions BID on 2/13/2019 and has been closely followed by ID, Dr. Lowry.  Will continue to hold Foscarnet infusions for now while awaiting resolution of AVILA and electrolyte imbalances. Most recent CMV PCR on 3/3 was 63.  ID following, appreciate recs.         Acute kidney injury    Cr 2.4 today. Will continue to hold Foscarnet infusions until Cr and electrolyte imbalances stable.  Will give 500 mL normal saline bolus.     Acute deep vein thrombosis (DVT)    Right brachial vein with partially occlusive thrombosis and small nonocclusive thrombus in the basilic vein. Occlusive thrombosis in the left greater saphenous vein also noted.  Continue apixaban 5 mg BID (started on 3/5).      Hypokalemia    Secondary to Foscarnet therapy. Continue with KCl 20 mEq daily.  Continue to monitor.     Hypomagnesemia    Secondary to Foscarnet infusions. Continue to monitor and replace as needed.           Irwin Celestin NP  Lung Transplant  [facility]

## 2019-03-09 LAB
ALBUMIN SERPL BCP-MCNC: 3.1 G/DL
ALP SERPL-CCNC: 59 U/L
ALT SERPL W/O P-5'-P-CCNC: 30 U/L
ANION GAP SERPL CALC-SCNC: 12 MMOL/L
ANISOCYTOSIS BLD QL SMEAR: SLIGHT
AST SERPL-CCNC: 25 U/L
BASOPHILS # BLD AUTO: ABNORMAL K/UL
BASOPHILS NFR BLD: 0 %
BILIRUB SERPL-MCNC: 0.4 MG/DL
BUN SERPL-MCNC: 34 MG/DL
CA-I BLDV-SCNC: 1.24 MMOL/L
CALCIUM SERPL-MCNC: 10.4 MG/DL
CHLORIDE SERPL-SCNC: 102 MMOL/L
CO2 SERPL-SCNC: 27 MMOL/L
CREAT SERPL-MCNC: 2.3 MG/DL
DIFFERENTIAL METHOD: ABNORMAL
EOSINOPHIL # BLD AUTO: ABNORMAL K/UL
EOSINOPHIL NFR BLD: 1 %
ERYTHROCYTE [DISTWIDTH] IN BLOOD BY AUTOMATED COUNT: 15.5 %
EST. GFR  (AFRICAN AMERICAN): 35.4 ML/MIN/1.73 M^2
EST. GFR  (NON AFRICAN AMERICAN): 30.6 ML/MIN/1.73 M^2
GLUCOSE SERPL-MCNC: 140 MG/DL
HCT VFR BLD AUTO: 30.3 %
HGB BLD-MCNC: 9.7 G/DL
HYPOCHROMIA BLD QL SMEAR: ABNORMAL
IMM GRANULOCYTES # BLD AUTO: ABNORMAL K/UL
IMM GRANULOCYTES NFR BLD AUTO: ABNORMAL %
LYMPHOCYTES # BLD AUTO: ABNORMAL K/UL
LYMPHOCYTES NFR BLD: 1 %
MAGNESIUM SERPL-MCNC: 1.3 MG/DL
MCH RBC QN AUTO: 30.3 PG
MCHC RBC AUTO-ENTMCNC: 32 G/DL
MCV RBC AUTO: 95 FL
MONOCYTES # BLD AUTO: ABNORMAL K/UL
MONOCYTES NFR BLD: 7 %
NEUTROPHILS NFR BLD: 91 %
NRBC BLD-RTO: 0 /100 WBC
OVALOCYTES BLD QL SMEAR: ABNORMAL
PHOSPHATE SERPL-MCNC: 5 MG/DL
PLATELET # BLD AUTO: 470 K/UL
PMV BLD AUTO: 8.6 FL
POCT GLUCOSE: 151 MG/DL (ref 70–110)
POCT GLUCOSE: 154 MG/DL (ref 70–110)
POCT GLUCOSE: 155 MG/DL (ref 70–110)
POCT GLUCOSE: 166 MG/DL (ref 70–110)
POIKILOCYTOSIS BLD QL SMEAR: SLIGHT
POLYCHROMASIA BLD QL SMEAR: ABNORMAL
POTASSIUM SERPL-SCNC: 3.8 MMOL/L
PROT SERPL-MCNC: 6.8 G/DL
RBC # BLD AUTO: 3.2 M/UL
SODIUM SERPL-SCNC: 141 MMOL/L
TACROLIMUS BLD-MCNC: <1.5 NG/ML
WBC # BLD AUTO: 11.04 K/UL

## 2019-03-09 PROCEDURE — 84100 ASSAY OF PHOSPHORUS: CPT

## 2019-03-09 PROCEDURE — 82330 ASSAY OF CALCIUM: CPT

## 2019-03-09 PROCEDURE — 63600175 PHARM REV CODE 636 W HCPCS: Performed by: PHYSICIAN ASSISTANT

## 2019-03-09 PROCEDURE — 25000003 PHARM REV CODE 250: Performed by: PHYSICIAN ASSISTANT

## 2019-03-09 PROCEDURE — 80053 COMPREHEN METABOLIC PANEL: CPT

## 2019-03-09 PROCEDURE — 36415 COLL VENOUS BLD VENIPUNCTURE: CPT

## 2019-03-09 PROCEDURE — 99232 SBSQ HOSP IP/OBS MODERATE 35: CPT | Mod: ,,, | Performed by: PHYSICIAN ASSISTANT

## 2019-03-09 PROCEDURE — 83735 ASSAY OF MAGNESIUM: CPT

## 2019-03-09 PROCEDURE — 80197 ASSAY OF TACROLIMUS: CPT

## 2019-03-09 PROCEDURE — 85007 BL SMEAR W/DIFF WBC COUNT: CPT

## 2019-03-09 PROCEDURE — 63600175 PHARM REV CODE 636 W HCPCS: Performed by: NURSE PRACTITIONER

## 2019-03-09 PROCEDURE — 20600001 HC STEP DOWN PRIVATE ROOM

## 2019-03-09 PROCEDURE — 85027 COMPLETE CBC AUTOMATED: CPT

## 2019-03-09 PROCEDURE — 99232 PR SUBSEQUENT HOSPITAL CARE,LEVL II: ICD-10-PCS | Mod: ,,, | Performed by: PHYSICIAN ASSISTANT

## 2019-03-09 PROCEDURE — 25000003 PHARM REV CODE 250: Performed by: INTERNAL MEDICINE

## 2019-03-09 RX ADMIN — VANCOMYCIN HYDROCHLORIDE 1000 MG: 1 INJECTION, POWDER, LYOPHILIZED, FOR SOLUTION INTRAVENOUS at 12:03

## 2019-03-09 RX ADMIN — AMLODIPINE BESYLATE 5 MG: 5 TABLET ORAL at 08:03

## 2019-03-09 RX ADMIN — FOLIC ACID 1000 MCG: 1 TABLET ORAL at 08:03

## 2019-03-09 RX ADMIN — MAGNESIUM 64 MG (MAGNESIUM CHLORIDE) TABLET,DELAYED RELEASE 128 MG: at 08:03

## 2019-03-09 RX ADMIN — MAGNESIUM SULFATE IN WATER 2 G: 40 INJECTION, SOLUTION INTRAVENOUS at 03:03

## 2019-03-09 RX ADMIN — INSULIN ASPART 7 UNITS: 100 INJECTION, SOLUTION INTRAVENOUS; SUBCUTANEOUS at 11:03

## 2019-03-09 RX ADMIN — FAMOTIDINE 20 MG: 20 TABLET ORAL at 08:03

## 2019-03-09 RX ADMIN — APIXABAN 2.5 MG: 2.5 TABLET, FILM COATED ORAL at 08:03

## 2019-03-09 RX ADMIN — SODIUM CHLORIDE 500 ML: 0.9 INJECTION, SOLUTION INTRAVENOUS at 10:03

## 2019-03-09 RX ADMIN — POTASSIUM CHLORIDE 20 MEQ: 1500 TABLET, EXTENDED RELEASE ORAL at 08:03

## 2019-03-09 RX ADMIN — PREDNISONE 5 MG: 5 TABLET ORAL at 08:03

## 2019-03-09 RX ADMIN — INSULIN ASPART 7 UNITS: 100 INJECTION, SOLUTION INTRAVENOUS; SUBCUTANEOUS at 04:03

## 2019-03-09 RX ADMIN — ASPIRIN 81 MG: 81 TABLET, COATED ORAL at 08:03

## 2019-03-09 RX ADMIN — PRAVASTATIN SODIUM 20 MG: 20 TABLET ORAL at 08:03

## 2019-03-09 RX ADMIN — CEFTRIAXONE 2 G: 2 INJECTION, SOLUTION INTRAVENOUS at 01:03

## 2019-03-09 RX ADMIN — FLUTICASONE PROPIONATE 50 MCG: 50 SPRAY, METERED NASAL at 08:03

## 2019-03-09 RX ADMIN — INSULIN ASPART 7 UNITS: 100 INJECTION, SOLUTION INTRAVENOUS; SUBCUTANEOUS at 07:03

## 2019-03-09 NOTE — ASSESSMENT & PLAN NOTE
Right brachial vein with partially occlusive thrombosis and small nonocclusive thrombus in the basilic vein. Occlusive thrombosis in the left greater saphenous vein also noted.  Continue apixaban 2.5 mg BID (started on 3/5).

## 2019-03-09 NOTE — SUBJECTIVE & OBJECTIVE
Subjective:     Interval History: No acute events overnight.  RUE edema improved from admission. No erythema noted. patient denies any respiratory complaints of cough and SOB. Denies any further episodes of diarrhea. Plan for repeat NS bolus at 500 cc today. Will continue to hold nephrotoxic medications and Foscarnet at this time.     Continuous Infusions:  Scheduled Meds:   amLODIPine  5 mg Oral Daily    apixaban  2.5 mg Oral BID    aspirin  81 mg Oral Daily    cefTRIAXone (ROCEPHIN) IVPB  2 g Intravenous Q24H    famotidine  20 mg Oral BID    fluticasone  1 spray Each Nare Daily    folic acid  1,000 mcg Oral Daily    insulin aspart U-100  7 Units Subcutaneous TIDWM    insulin detemir U-100  13 Units Subcutaneous QHS    magnesium chloride  128 mg Oral Daily    potassium chloride SA  20 mEq Oral Daily    pravastatin  20 mg Oral Daily    predniSONE  5 mg Oral Daily    sodium chloride 0.9%  500 mL Intravenous Once    vancomycin (VANCOCIN) IVPB (custom)  1,000 mg Intravenous Q24H     PRN Meds:acetaminophen, calcium gluconate IVPB, calcium gluconate IVPB, calcium gluconate IVPB, dextrose 50%, dextrose 50%, glucagon (human recombinant), glucose, glucose, insulin aspart U-100, magnesium sulfate IVPB **AND** magnesium sulfate IVPB, ondansetron, potassium chloride 10% **AND** potassium chloride 10% **AND** potassium chloride 10%, promethazine (PHENERGAN) IVPB    Review of patient's allergies indicates:  No Known Allergies    Review of Systems   Constitutional: Negative for chills, diaphoresis and fever.   HENT: Negative for congestion, rhinorrhea, sinus pain, sore throat and voice change.    Eyes: Negative for discharge and redness.   Respiratory: Negative for cough, shortness of breath and wheezing.    Cardiovascular: Positive for leg swelling. Negative for chest pain.   Gastrointestinal: Negative for abdominal distention, abdominal pain, diarrhea, nausea and vomiting.   Endocrine: Negative for polydipsia and  polyuria.   Genitourinary: Negative for dysuria, frequency and urgency.   Musculoskeletal: Negative for back pain, myalgias, neck pain and neck stiffness.   Skin: Negative for color change and pallor.   Allergic/Immunologic: Positive for immunocompromised state.   Neurological: Negative for dizziness, light-headedness and headaches.   Hematological: Negative for adenopathy. Does not bruise/bleed easily.   Psychiatric/Behavioral: Negative for agitation, confusion and decreased concentration.     Objective:   Physical Exam   Constitutional: He is oriented to person, place, and time. He appears well-developed and well-nourished. No distress.   HENT:   Head: Normocephalic and atraumatic.   Right Ear: External ear normal.   Left Ear: External ear normal.   Nose: Nose normal.   Mouth/Throat: Oropharynx is clear and moist.   Eyes: Conjunctivae and EOM are normal. Right eye exhibits no discharge. Left eye exhibits no discharge.   Neck: Normal range of motion. Neck supple. No JVD present.   Cardiovascular: Normal rate, regular rhythm and normal heart sounds. Exam reveals no gallop and no friction rub.   No murmur heard.  Pulmonary/Chest: Effort normal. He has no wheezes. He has no rhonchi. He has no rales.   Well-healed mid-line sternotomy incision   Abdominal: Soft. Bowel sounds are normal. He exhibits no distension. There is no tenderness.   Musculoskeletal: Normal range of motion. He exhibits edema (RUE much improved; minimal BLE).   Neurological: He is alert and oriented to person, place, and time.   Skin: Skin is warm and dry. He is not diaphoretic.   Psychiatric: He has a normal mood and affect. His behavior is normal.   Nursing note and vitals reviewed.        Vital Signs (Most Recent):  Temp: 98.6 °F (37 °C) (03/09/19 0756)  Pulse: 89 (03/09/19 0754)  Resp: 15 (03/09/19 0754)  BP: 119/74 (03/09/19 0754)  SpO2: 97 % (03/09/19 0754) Vital Signs (24h Range):  Temp:  [98 °F (36.7 °C)-98.6 °F (37 °C)] 98.6 °F (37  °C)  Pulse:  [78-95] 89  Resp:  [7-24] 15  SpO2:  [95 %-100 %] 97 %  BP: (119-155)/() 119/74     Weight: 103 kg (227 lb 1.2 oz)  Body mass index is 31.67 kg/m².      Intake/Output Summary (Last 24 hours) at 3/9/2019 1034  Last data filed at 3/9/2019 0500  Gross per 24 hour   Intake 2050 ml   Output 450 ml   Net 1600 ml       Significant Labs:  CBC:  Recent Labs   Lab 03/08/19  0704   WBC 10.47   RBC 3.36*   HGB 10.3*   HCT 32.3*   *   MCV 96   MCH 30.7   MCHC 31.9*     BMP:  Recent Labs   Lab 03/09/19  0754      K 3.8      CO2 27   BUN 34*   CREATININE 2.3*   CALCIUM 10.4      Tacrolimus Levels:  Recent Labs   Lab 03/08/19  0704   TACROLIMUS 2.3*     Microbiology:  Microbiology Results (last 7 days)     Procedure Component Value Units Date/Time    Blood culture #2 **CANNOT BE ORDERED STAT** [[id number]] Collected:  03/03/19 1443    Order Status:  Completed Specimen:  Blood from Peripheral, Wrist, Left Updated:  03/08/19 1612     Blood Culture, Routine No growth after 5 days.    Blood culture #1 **CANNOT BE ORDERED STAT** [[id number]] Collected:  03/03/19 1352    Order Status:  Completed Specimen:  Blood from Peripheral, Antecubital, Left Updated:  03/08/19 1612     Blood Culture, Routine No growth after 5 days.    IV catheter culture [[id number]] Collected:  03/03/19 1502    Order Status:  Completed Specimen:  Catheter Tip, PICC Updated:  03/06/19 0933     Aerobic Culture - Cath tip No growth    Clostridium difficile EIA [[id number]] Collected:  03/03/19 2106    Order Status:  Completed Specimen:  Stool Updated:  03/04/19 0353     C. diff Antigen Negative     C difficile Toxins A+B, EIA Negative     Comment: Testing not recommended for children <24 months old.             I have reviewed all pertinent labs within the past 24 hours.

## 2019-03-09 NOTE — ASSESSMENT & PLAN NOTE
BG goal 140-180.     Continue Levemir  13 units q HS  Continue Novolog  7 units with meals   Low dose correction scale - given kidney function  BG monitoring AC/HS    Discharge planning:   Continue home insulin. However, decrease doses unit-for-unit to correlate with current hospital dosage.     Have patient follow-up with Endocrinology discharge clinic.

## 2019-03-09 NOTE — SUBJECTIVE & OBJECTIVE
"Interval HPI:   Overnight events:    NAEON. BG is currently within goal on current SQ insulin regimen. However, Prandial excursions remain. Remains on steroid.     Eatin%  Nausea: No  Hypoglycemia and intervention: No  Fever: No  TPN and/or TF: No  If yes, type of TF/TPN and rate: None    BP (!) 149/100   Pulse 83   Temp 98.1 °F (36.7 °C)   Resp (!) 24   Ht 5' 11" (1.803 m)   Wt 103.1 kg (227 lb 4.7 oz)   SpO2 95%   BMI 31.70 kg/m²     Labs Reviewed and Include    Recent Labs   Lab 19  0705   *   CALCIUM 10.8*   ALBUMIN 3.1*   PROT 7.0      K 4.0   CO2 27      BUN 35*   CREATININE 2.4*   ALKPHOS 60   ALT 34   AST 27   BILITOT 0.4     Lab Results   Component Value Date    WBC 10.47 2019    HGB 10.3 (L) 2019    HCT 32.3 (L) 2019    MCV 96 2019     (H) 2019     No results for input(s): TSH, FREET4 in the last 168 hours.  Lab Results   Component Value Date    HGBA1C 10.9 (H) 2019       Nutritional status:   Body mass index is 31.7 kg/m².  Lab Results   Component Value Date    ALBUMIN 3.1 (L) 2019    ALBUMIN 2.7 (L) 2019    ALBUMIN 3.1 (L) 2019     No results found for: PREALBUMIN    Estimated Creatinine Clearance: 42 mL/min (A) (based on SCr of 2.4 mg/dL (H)).    Accu-Checks  Recent Labs     19  1209 19  1706 19  2108 19  0829 19  1201 19  1737 19  2127 19  0751 19  1145 19  1655   POCTGLUCOSE 101 132* 125* 120* 93 161* 160* 120* 127* 193*       Current Medications and/or Treatments Impacting Glycemic Control  Immunotherapy:    Immunosuppressants     None        Steroids:   Hormones (From admission, onward)    Start     Stop Route Frequency Ordered    19 0900  predniSONE tablet 5 mg      -- Oral Daily 19 8697        Pressors:    Autonomic Drugs (From admission, onward)    None        Hyperglycemia/Diabetes Medications:   Antihyperglycemics (From " admission, onward)    Start     Stop Route Frequency Ordered    03/07/19 2100  insulin detemir U-100 pen 13 Units      -- SubQ Nightly 03/07/19 1048    03/07/19 1645  insulin aspart U-100 pen 7 Units      -- SubQ 3 times daily with meals 03/07/19 1508    03/03/19 1745  insulin aspart U-100 pen 0-5 Units      -- SubQ Before meals & nightly PRN 03/03/19 1645

## 2019-03-09 NOTE — PROGRESS NOTES
[facility]  Lung Transplant  Progress Note - Floor    Patient Name: Martin Hendrix Jr.  MRN: [id number]  Admission Date: 3/3/2019  Hospital Length of Stay: 6 days  Post-Operative Day: 564  Attending Physician: Samantha Bill DO  Primary Care Provider: Sukhi Dunham Jr, MD     Subjective:     Interval History: No acute events overnight.  RUE edema improved from admission. No erythema noted. patient denies any respiratory complaints of cough and SOB. Denies any further episodes of diarrhea. Plan for repeat NS bolus at 500 cc today. Will continue to hold nephrotoxic medications and Foscarnet at this time.     Continuous Infusions:  Scheduled Meds:   amLODIPine  5 mg Oral Daily    apixaban  2.5 mg Oral BID    aspirin  81 mg Oral Daily    cefTRIAXone (ROCEPHIN) IVPB  2 g Intravenous Q24H    famotidine  20 mg Oral BID    fluticasone  1 spray Each Nare Daily    folic acid  1,000 mcg Oral Daily    insulin aspart U-100  7 Units Subcutaneous TIDWM    insulin detemir U-100  13 Units Subcutaneous QHS    magnesium chloride  128 mg Oral Daily    potassium chloride SA  20 mEq Oral Daily    pravastatin  20 mg Oral Daily    predniSONE  5 mg Oral Daily    sodium chloride 0.9%  500 mL Intravenous Once    vancomycin (VANCOCIN) IVPB (custom)  1,000 mg Intravenous Q24H     PRN Meds:acetaminophen, calcium gluconate IVPB, calcium gluconate IVPB, calcium gluconate IVPB, dextrose 50%, dextrose 50%, glucagon (human recombinant), glucose, glucose, insulin aspart U-100, magnesium sulfate IVPB **AND** magnesium sulfate IVPB, ondansetron, potassium chloride 10% **AND** potassium chloride 10% **AND** potassium chloride 10%, promethazine (PHENERGAN) IVPB    Review of patient's allergies indicates:  No Known Allergies    Review of Systems   Constitutional: Negative for chills, diaphoresis and fever.   HENT: Negative for congestion, rhinorrhea, sinus pain, sore throat and voice change.    Eyes: Negative for  discharge and redness.   Respiratory: Negative for cough, shortness of breath and wheezing.    Cardiovascular: Positive for leg swelling. Negative for chest pain.   Gastrointestinal: Negative for abdominal distention, abdominal pain, diarrhea, nausea and vomiting.   Endocrine: Negative for polydipsia and polyuria.   Genitourinary: Negative for dysuria, frequency and urgency.   Musculoskeletal: Negative for back pain, myalgias, neck pain and neck stiffness.   Skin: Negative for color change and pallor.   Allergic/Immunologic: Positive for immunocompromised state.   Neurological: Negative for dizziness, light-headedness and headaches.   Hematological: Negative for adenopathy. Does not bruise/bleed easily.   Psychiatric/Behavioral: Negative for agitation, confusion and decreased concentration.     Objective:   Physical Exam   Constitutional: He is oriented to person, place, and time. He appears well-developed and well-nourished. No distress.   HENT:   Head: Normocephalic and atraumatic.   Right Ear: External ear normal.   Left Ear: External ear normal.   Nose: Nose normal.   Mouth/Throat: Oropharynx is clear and moist.   Eyes: Conjunctivae and EOM are normal. Right eye exhibits no discharge. Left eye exhibits no discharge.   Neck: Normal range of motion. Neck supple. No JVD present.   Cardiovascular: Normal rate, regular rhythm and normal heart sounds. Exam reveals no gallop and no friction rub.   No murmur heard.  Pulmonary/Chest: Effort normal. He has no wheezes. He has no rhonchi. He has no rales.   Well-healed mid-line sternotomy incision   Abdominal: Soft. Bowel sounds are normal. He exhibits no distension. There is no tenderness.   Musculoskeletal: Normal range of motion. He exhibits edema (RUE much improved; minimal BLE).   Neurological: He is alert and oriented to person, place, and time.   Skin: Skin is warm and dry. He is not diaphoretic.   Psychiatric: He has a normal mood and affect. His behavior is normal.    Nursing note and vitals reviewed.        Vital Signs (Most Recent):  Temp: 98.6 °F (37 °C) (03/09/19 0756)  Pulse: 89 (03/09/19 0754)  Resp: 15 (03/09/19 0754)  BP: 119/74 (03/09/19 0754)  SpO2: 97 % (03/09/19 0754) Vital Signs (24h Range):  Temp:  [98 °F (36.7 °C)-98.6 °F (37 °C)] 98.6 °F (37 °C)  Pulse:  [78-95] 89  Resp:  [7-24] 15  SpO2:  [95 %-100 %] 97 %  BP: (119-155)/() 119/74     Weight: 103 kg (227 lb 1.2 oz)  Body mass index is 31.67 kg/m².      Intake/Output Summary (Last 24 hours) at 3/9/2019 1034  Last data filed at 3/9/2019 0500  Gross per 24 hour   Intake 2050 ml   Output 450 ml   Net 1600 ml       Significant Labs:  CBC:  Recent Labs   Lab 03/08/19  0704   WBC 10.47   RBC 3.36*   HGB 10.3*   HCT 32.3*   *   MCV 96   MCH 30.7   MCHC 31.9*     BMP:  Recent Labs   Lab 03/09/19  0754      K 3.8      CO2 27   BUN 34*   CREATININE 2.3*   CALCIUM 10.4      Tacrolimus Levels:  Recent Labs   Lab 03/08/19  0704   TACROLIMUS 2.3*     Microbiology:  Microbiology Results (last 7 days)     Procedure Component Value Units Date/Time    Blood culture #2 **CANNOT BE ORDERED STAT** [[id number]] Collected:  03/03/19 1443    Order Status:  Completed Specimen:  Blood from Peripheral, Wrist, Left Updated:  03/08/19 1612     Blood Culture, Routine No growth after 5 days.    Blood culture #1 **CANNOT BE ORDERED STAT** [[id number]] Collected:  03/03/19 1352    Order Status:  Completed Specimen:  Blood from Peripheral, Antecubital, Left Updated:  03/08/19 1612     Blood Culture, Routine No growth after 5 days.    IV catheter culture [[id number]] Collected:  03/03/19 1502    Order Status:  Completed Specimen:  Catheter Tip, PICC Updated:  03/06/19 0933     Aerobic Culture - Cath tip No growth    Clostridium difficile EIA [[id number]] Collected:  03/03/19 2108    Order Status:  Completed Specimen:  Stool Updated:  03/04/19 0353     C. diff Antigen Negative     C difficile Toxins A+B, EIA Negative      Comment: Testing not recommended for children <24 months old.             I have reviewed all pertinent labs within the past 24 hours.        Assessment/Plan:     * Cellulitis of right upper extremity    Initially had a WBC of 21, which was concerning for possible bacteremia versus cellulitis from PICC line. PICC removed while in ER. Tip sent for culture with NGTD. Blood cultures x2 with NGTD. Procalcitonin significantly elevated at 11.32 at admit.  Was on IV vancomycin and cefepime, but Cefepime changed to ceftriaxone per ID recs.  Will be treated for a full 14 days.      Will likely need a PICC placed prior to discharge. Will look to have PICC placed once creatine improves. Will give 500 mL normal saline bolus today. Right arm swelling is decreased and erythema has resolved.          Acute kidney injury    Cr 2.3 today and continues to slowly downtrend. Will continue to hold Foscarnet infusions until Cr and electrolyte imbalances stable.  Will give repeat 500 mL normal saline bolus today. Will continue to hold nephrotoxic medications of Bactrim DS and tacrolimus while waiting for resolution of AVILA.      Lung replaced by transplant    Patient is s/p BLT 18 months ago secondary to sarcoidosis with associated pulmonary hypertension. 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 Last Foscarnet infusion on 3/2 PM dose.      Continue to hold tacrolimus while AVILA resolves. Continue immunosuppression with prednisone 5 mg daily.  Maintain O2 sats > 88% throughout the day. Encourage ambulation.      Immunosuppression    Current prednisone 5 mg daily.  Will continue to hold tacrolimus in setting of AVILA. Will monitor daily tacrolimus levels daily while inpatient.      Prophylactic antibiotic    Will hold Bactrim due to nephrotoxic side effects.      Cytomegalovirus (CMV) viremia    Started on Foscarnet IV infusions BID on 2/13/2019 and has been closely followed by ID, Dr. Lowry.  Will continue to hold Foscarnet infusions for now while awaiting resolution of AVILA and electrolyte imbalances. Most recent CMV PCR on 3/3 was 63.  ID following, appreciate recs.         Hypokalemia    Secondary to Foscarnet therapy. Continue with KCl 20 mEq daily.  Continue to monitor.     Hypomagnesemia    Secondary to Foscarnet infusions. Continue to monitor and replace as needed.       Type 2 diabetes mellitus with hyperglycemia, with long-term current use of insulin    Endocrine consulted, appreciate recs.     Acute deep vein thrombosis (DVT)    Right brachial vein with partially occlusive thrombosis and small nonocclusive thrombus in the basilic vein. Occlusive thrombosis in the left greater saphenous vein also noted.  Continue apixaban 2.5 mg BID (started on 3/5).          Patricia Fowler PA-C  Lung Transplant  [facility]

## 2019-03-09 NOTE — ASSESSMENT & PLAN NOTE
Cr 2.3 today and continues to slowly downtrend. Will continue to hold Foscarnet infusions until Cr and electrolyte imbalances stable.  Will give repeat 500 mL normal saline bolus today. Will continue to hold nephrotoxic medications of Bactrim DS and tacrolimus while waiting for resolution of AVILA.

## 2019-03-09 NOTE — PLAN OF CARE
Problem: Adult Inpatient Plan of Care  Goal: Plan of Care Review  Outcome: Ongoing (interventions implemented as appropriate)  -Pt AAOx4  -Vital signs stable  -BG monitoring ACHS; BG HS was 169-no SSI given  -IV vanc and reocephin continued per order  -Cr today: 2.4 and trending down  -Fall precautions maintained, non skid socks worn  -No acute events/falls/injuries this shift    -Bed lowered, locked, siderails up x2, and call bell in reach.     See flowsheet for assessment findings.  Will continue to monitor pt.

## 2019-03-09 NOTE — PROGRESS NOTES
"[facility]  Endocrinology  Progress Note    Admit Date: 3/3/2019     Reason for Consult: Management of type 2 DM, Hyperglycemia     Surgical Procedure and Date: 2017 - Bilateral Lung Transplant     Diabetes diagnosis year:      Home Diabetes Medications:   Tresiba 20 units HS and humalog 12 units with meals  Lab Results   Component Value Date    HGBA1C 10.9 (H) 2019        How often checking glucose at home? One   BG readings on regimen: 100s  Hypoglycemia on the regimen?  no  Missed doses on regimen?  Yes about once a day - humalog missed      Diabetes Complications include:     Hyperglycemia     Complicating diabetes co morbidities:   KRISTYN and Glucocorticoid use , A-fib, AVILA, Pancreatitis, Osteopenia        HPI:   Patient is a 58 y.o. male with a diagnosis of type 2 DM on MDI. Also with sarcoidosis (s/p BLT), KRISTYN, osteopenia, and Afib. Patient presented to ED with right upper arm swelling were PICC is located; also with nausea, vomiting, and diarrhea. Endocrinology consulted for BG/ DM management.             Interval HPI:   Overnight events:    NAEON. BG is currently within goal on current SQ insulin regimen. However, Prandial excursions remain. Remains on steroid.     Eatin%  Nausea: No  Hypoglycemia and intervention: No  Fever: No  TPN and/or TF: No  If yes, type of TF/TPN and rate: None    BP (!) 149/100   Pulse 83   Temp 98.1 °F (36.7 °C)   Resp (!) 24   Ht 5' 11" (1.803 m)   Wt 103.1 kg (227 lb 4.7 oz)   SpO2 95%   BMI 31.70 kg/m²      Labs Reviewed and Include    Recent Labs   Lab 19  0705   *   CALCIUM 10.8*   ALBUMIN 3.1*   PROT 7.0      K 4.0   CO2 27      BUN 35*   CREATININE 2.4*   ALKPHOS 60   ALT 34   AST 27   BILITOT 0.4     Lab Results   Component Value Date    WBC 10.47 2019    HGB 10.3 (L) 2019    HCT 32.3 (L) 2019    MCV 96 2019     (H) 2019     No results for input(s): TSH, FREET4 in " the last 168 hours.  Lab Results   Component Value Date    HGBA1C 10.9 (H) 02/12/2019       Nutritional status:   Body mass index is 31.7 kg/m².  Lab Results   Component Value Date    ALBUMIN 3.1 (L) 03/08/2019    ALBUMIN 2.7 (L) 03/07/2019    ALBUMIN 3.1 (L) 03/06/2019     No results found for: PREALBUMIN    Estimated Creatinine Clearance: 42 mL/min (A) (based on SCr of 2.4 mg/dL (H)).    Accu-Checks  Recent Labs     03/06/19  1209 03/06/19  1706 03/06/19  2108 03/07/19  0829 03/07/19  1201 03/07/19  1737 03/07/19  2127 03/08/19  0751 03/08/19  1145 03/08/19  1655   POCTGLUCOSE 101 132* 125* 120* 93 161* 160* 120* 127* 193*       Current Medications and/or Treatments Impacting Glycemic Control  Immunotherapy:    Immunosuppressants     None        Steroids:   Hormones (From admission, onward)    Start     Stop Route Frequency Ordered    03/04/19 0900  predniSONE tablet 5 mg      -- Oral Daily 03/03/19 1517        Pressors:    Autonomic Drugs (From admission, onward)    None        Hyperglycemia/Diabetes Medications:   Antihyperglycemics (From admission, onward)    Start     Stop Route Frequency Ordered    03/07/19 2100  insulin detemir U-100 pen 13 Units      -- SubQ Nightly 03/07/19 1048    03/07/19 1645  insulin aspart U-100 pen 7 Units      -- SubQ 3 times daily with meals 03/07/19 1508    03/03/19 1745  insulin aspart U-100 pen 0-5 Units      -- SubQ Before meals & nightly PRN 03/03/19 1645          ASSESSMENT and PLAN    * Cellulitis of right upper extremity    Infection may increase insulin resistance.        Type 2 diabetes mellitus with hyperglycemia, with long-term current use of insulin    BG goal 140-180.     Continue Levemir  13 units q HS  Continue Novolog  7 units with meals   Low dose correction scale - given kidney function  BG monitoring AC/HS    Discharge planning:   Continue home insulin. However, decrease doses unit-for-unit to correlate with current hospital dosage.     Have patient follow-up  with Endocrinology discharge clinic.      Cytomegalovirus (CMV) viremia    Managed per primary team  Avoid hypoglycemia         Immunosuppression    May increase insulin resistance.     Class 1 obesity due to excess calories with serious comorbidity and body mass index (BMI) of 30.0 to 30.9 in adult    may contribute to insulin resistance  Body mass index is 31.7 kg/m².             Delroy Vergara NP  Endocrinology  [facility]

## 2019-03-09 NOTE — CARE UPDATE
BG goal 140-180.    Creatinine continues to be elevated per chart review. BG is within goal on current SQ insulin regimen.     Continue Levemir  13 units q HS  Continue Novolog  7 units with meals   Low dose correction scale   BG monitoring AC/HS     Discharge planning:   Continue home insulin. However, decrease doses unit-for-unit to correlate with current hospital dosage.      Have patient follow-up with Endocrinology discharge clinic.

## 2019-03-10 ENCOUNTER — TELEPHONE (OUTPATIENT)
Dept: ENDOCRINOLOGY | Facility: HOSPITAL | Age: 59
End: 2019-03-10

## 2019-03-10 LAB
ALBUMIN SERPL BCP-MCNC: 3 G/DL
ALP SERPL-CCNC: 60 U/L
ALT SERPL W/O P-5'-P-CCNC: 22 U/L
ANION GAP SERPL CALC-SCNC: 8 MMOL/L
ANISOCYTOSIS BLD QL SMEAR: SLIGHT
AST SERPL-CCNC: 22 U/L
BASOPHILS # BLD AUTO: ABNORMAL K/UL
BASOPHILS NFR BLD: 1 %
BILIRUB SERPL-MCNC: 0.3 MG/DL
BUN SERPL-MCNC: 30 MG/DL
CA-I BLDV-SCNC: 1.32 MMOL/L
CALCIUM SERPL-MCNC: 10.2 MG/DL
CHLORIDE SERPL-SCNC: 102 MMOL/L
CO2 SERPL-SCNC: 31 MMOL/L
CREAT SERPL-MCNC: 2.2 MG/DL
DIFFERENTIAL METHOD: ABNORMAL
EOSINOPHIL # BLD AUTO: ABNORMAL K/UL
EOSINOPHIL NFR BLD: 2 %
ERYTHROCYTE [DISTWIDTH] IN BLOOD BY AUTOMATED COUNT: 15.4 %
EST. GFR  (AFRICAN AMERICAN): 37.3 ML/MIN/1.73 M^2
EST. GFR  (NON AFRICAN AMERICAN): 32.3 ML/MIN/1.73 M^2
GLUCOSE SERPL-MCNC: 114 MG/DL
HCT VFR BLD AUTO: 28.2 %
HGB BLD-MCNC: 9 G/DL
HYPOCHROMIA BLD QL SMEAR: ABNORMAL
IMM GRANULOCYTES # BLD AUTO: ABNORMAL K/UL
IMM GRANULOCYTES NFR BLD AUTO: ABNORMAL %
LYMPHOCYTES # BLD AUTO: ABNORMAL K/UL
LYMPHOCYTES NFR BLD: 8 %
MAGNESIUM SERPL-MCNC: 1.5 MG/DL
MCH RBC QN AUTO: 29.7 PG
MCHC RBC AUTO-ENTMCNC: 31.9 G/DL
MCV RBC AUTO: 93 FL
MONOCYTES # BLD AUTO: ABNORMAL K/UL
MONOCYTES NFR BLD: 7 %
MYELOCYTES NFR BLD MANUAL: 3 %
NEUTROPHILS NFR BLD: 79 %
NRBC BLD-RTO: 0 /100 WBC
OVALOCYTES BLD QL SMEAR: ABNORMAL
PHOSPHATE SERPL-MCNC: 5 MG/DL
PLATELET # BLD AUTO: 403 K/UL
PLATELET BLD QL SMEAR: ABNORMAL
PMV BLD AUTO: 8.5 FL
POCT GLUCOSE: 113 MG/DL (ref 70–110)
POCT GLUCOSE: 129 MG/DL (ref 70–110)
POCT GLUCOSE: 151 MG/DL (ref 70–110)
POCT GLUCOSE: 165 MG/DL (ref 70–110)
POIKILOCYTOSIS BLD QL SMEAR: SLIGHT
POLYCHROMASIA BLD QL SMEAR: ABNORMAL
POTASSIUM SERPL-SCNC: 3.5 MMOL/L
PROT SERPL-MCNC: 6.5 G/DL
RBC # BLD AUTO: 3.03 M/UL
SODIUM SERPL-SCNC: 141 MMOL/L
TACROLIMUS BLD-MCNC: <1.5 NG/ML
VANCOMYCIN TROUGH SERPL-MCNC: 17.6 UG/ML
WBC # BLD AUTO: 9.78 K/UL

## 2019-03-10 PROCEDURE — 99231 PR SUBSEQUENT HOSPITAL CARE,LEVL I: ICD-10-PCS | Mod: ,,, | Performed by: NURSE PRACTITIONER

## 2019-03-10 PROCEDURE — 63600175 PHARM REV CODE 636 W HCPCS: Performed by: PHYSICIAN ASSISTANT

## 2019-03-10 PROCEDURE — 80053 COMPREHEN METABOLIC PANEL: CPT

## 2019-03-10 PROCEDURE — 20600001 HC STEP DOWN PRIVATE ROOM

## 2019-03-10 PROCEDURE — 99231 SBSQ HOSP IP/OBS SF/LOW 25: CPT | Mod: ,,, | Performed by: NURSE PRACTITIONER

## 2019-03-10 PROCEDURE — 83735 ASSAY OF MAGNESIUM: CPT

## 2019-03-10 PROCEDURE — 80197 ASSAY OF TACROLIMUS: CPT

## 2019-03-10 PROCEDURE — 80202 ASSAY OF VANCOMYCIN: CPT

## 2019-03-10 PROCEDURE — 84100 ASSAY OF PHOSPHORUS: CPT

## 2019-03-10 PROCEDURE — 82330 ASSAY OF CALCIUM: CPT

## 2019-03-10 PROCEDURE — 85007 BL SMEAR W/DIFF WBC COUNT: CPT

## 2019-03-10 PROCEDURE — 36415 COLL VENOUS BLD VENIPUNCTURE: CPT

## 2019-03-10 PROCEDURE — 25000003 PHARM REV CODE 250: Performed by: PHYSICIAN ASSISTANT

## 2019-03-10 PROCEDURE — 99232 SBSQ HOSP IP/OBS MODERATE 35: CPT | Mod: ,,, | Performed by: PHYSICIAN ASSISTANT

## 2019-03-10 PROCEDURE — 25000003 PHARM REV CODE 250: Performed by: INTERNAL MEDICINE

## 2019-03-10 PROCEDURE — 99232 PR SUBSEQUENT HOSPITAL CARE,LEVL II: ICD-10-PCS | Mod: ,,, | Performed by: PHYSICIAN ASSISTANT

## 2019-03-10 PROCEDURE — 63600175 PHARM REV CODE 636 W HCPCS: Performed by: NURSE PRACTITIONER

## 2019-03-10 PROCEDURE — 85027 COMPLETE CBC AUTOMATED: CPT

## 2019-03-10 RX ORDER — TACROLIMUS 0.5 MG/1
0.5 CAPSULE ORAL 2 TIMES DAILY
Status: DISCONTINUED | OUTPATIENT
Start: 2019-03-10 | End: 2019-03-13

## 2019-03-10 RX ADMIN — AMLODIPINE BESYLATE 5 MG: 5 TABLET ORAL at 08:03

## 2019-03-10 RX ADMIN — APIXABAN 2.5 MG: 2.5 TABLET, FILM COATED ORAL at 08:03

## 2019-03-10 RX ADMIN — TACROLIMUS 0.5 MG: 0.5 CAPSULE ORAL at 05:03

## 2019-03-10 RX ADMIN — PREDNISONE 5 MG: 5 TABLET ORAL at 08:03

## 2019-03-10 RX ADMIN — FLUTICASONE PROPIONATE 50 MCG: 50 SPRAY, METERED NASAL at 08:03

## 2019-03-10 RX ADMIN — VANCOMYCIN HYDROCHLORIDE 1000 MG: 1 INJECTION, POWDER, LYOPHILIZED, FOR SOLUTION INTRAVENOUS at 02:03

## 2019-03-10 RX ADMIN — SODIUM CHLORIDE 500 ML: 0.9 INJECTION, SOLUTION INTRAVENOUS at 10:03

## 2019-03-10 RX ADMIN — CEFTRIAXONE 2 G: 2 INJECTION, SOLUTION INTRAVENOUS at 01:03

## 2019-03-10 RX ADMIN — INSULIN ASPART 7 UNITS: 100 INJECTION, SOLUTION INTRAVENOUS; SUBCUTANEOUS at 08:03

## 2019-03-10 RX ADMIN — INSULIN ASPART 7 UNITS: 100 INJECTION, SOLUTION INTRAVENOUS; SUBCUTANEOUS at 11:03

## 2019-03-10 RX ADMIN — FOLIC ACID 1000 MCG: 1 TABLET ORAL at 08:03

## 2019-03-10 RX ADMIN — FAMOTIDINE 20 MG: 20 TABLET ORAL at 08:03

## 2019-03-10 RX ADMIN — INSULIN ASPART 7 UNITS: 100 INJECTION, SOLUTION INTRAVENOUS; SUBCUTANEOUS at 04:03

## 2019-03-10 RX ADMIN — ASPIRIN 81 MG: 81 TABLET, COATED ORAL at 08:03

## 2019-03-10 RX ADMIN — POTASSIUM CHLORIDE 20 MEQ: 1500 TABLET, EXTENDED RELEASE ORAL at 08:03

## 2019-03-10 RX ADMIN — PRAVASTATIN SODIUM 20 MG: 20 TABLET ORAL at 08:03

## 2019-03-10 RX ADMIN — MAGNESIUM 64 MG (MAGNESIUM CHLORIDE) TABLET,DELAYED RELEASE 128 MG: at 08:03

## 2019-03-10 NOTE — PLAN OF CARE
Problem: Adult Inpatient Plan of Care  Goal: Plan of Care Review  - Patient is AAOx4, independent and ambulatory. Patient educated to use call bell for assistance, verbalized understanding.   - Afebrile, WBC 9.78, continuing IV vanc and rocephin. Vanc trough from 3/10 - 17.6.   - Restarting prograf this afternoon   - CR 2.2 - urine output 925 cc measured, 2 unmeasured occurrences. Patient received 500 cc NS bolus.   - Accuchecks AC/HS - mealtime insulin administered per order   - No complaints of pain   - See flow sheet for complete assessment details

## 2019-03-10 NOTE — PROGRESS NOTES
"[facility]  Endocrinology  Progress Note    Admit Date: 3/3/2019     Reason for Consult: Management of type 2 DM, Hyperglycemia     Surgical Procedure and Date: 2017 - Bilateral Lung Transplant     Diabetes diagnosis year:      Home Diabetes Medications:   Tresiba 20 units HS and humalog 12 units with meals  Lab Results   Component Value Date    HGBA1C 10.9 (H) 2019        How often checking glucose at home? One   BG readings on regimen: 100s  Hypoglycemia on the regimen?  no  Missed doses on regimen?  Yes about once a day - humalog missed      Diabetes Complications include:     Hyperglycemia     Complicating diabetes co morbidities:   KRISTYN and Glucocorticoid use , A-fib, AVILA, Pancreatitis, Osteopenia        HPI:   Patient is a 58 y.o. male with a diagnosis of type 2 DM on MDI. Also with sarcoidosis (s/p BLT), KRISTYN, osteopenia, and Afib. Patient presented to ED with right upper arm swelling were PICC is located; also with nausea, vomiting, and diarrhea. Endocrinology consulted for BG/ DM management.             Interval HPI:   Overnight events:   NAEON. BG remains within goal on on current SQ insulin regimen. Will discuss post discharge follow-up with patient at bedside. However, Cr remains elevated, which may delay discharge. Prednisone 5 mg.   Eatin%  Nausea: No  Hypoglycemia and intervention: No  Fever: No  TPN and/or TF: No  If yes, type of TF/TPN and rate: None    BP (!) 148/92   Pulse 87   Temp 98 °F (36.7 °C) (Oral)   Resp 13   Ht 5' 11" (1.803 m)   Wt 100.4 kg (221 lb 5.5 oz)   SpO2 97%   BMI 30.87 kg/m²      Labs Reviewed and Include    Recent Labs   Lab 03/10/19  0720   *   CALCIUM 10.2   ALBUMIN 3.0*   PROT 6.5      K 3.5   CO2 31*      BUN 30*   CREATININE 2.2*   ALKPHOS 60   ALT 22   AST 22   BILITOT 0.3     Lab Results   Component Value Date    WBC 9.78 03/10/2019    HGB 9.0 (L) 03/10/2019    HCT 28.2 (L) 03/10/2019    MCV 93 " 03/10/2019     (H) 03/10/2019     No results for input(s): TSH, FREET4 in the last 168 hours.  Lab Results   Component Value Date    HGBA1C 10.9 (H) 02/12/2019       Nutritional status:   Body mass index is 30.87 kg/m².  Lab Results   Component Value Date    ALBUMIN 3.0 (L) 03/10/2019    ALBUMIN 3.1 (L) 03/09/2019    ALBUMIN 3.1 (L) 03/08/2019     No results found for: PREALBUMIN    Estimated Creatinine Clearance: 45.2 mL/min (A) (based on SCr of 2.2 mg/dL (H)).    Accu-Checks  Recent Labs     03/08/19  0751 03/08/19  1145 03/08/19  1655 03/08/19  2102 03/09/19  0757 03/09/19  1121 03/09/19  1626 03/09/19  2051 03/10/19  0719 03/10/19  1142   POCTGLUCOSE 120* 127* 193* 169* 151* 166* 155* 154* 129* 165*       Current Medications and/or Treatments Impacting Glycemic Control  Immunotherapy:    Immunosuppressants         Stop Route Frequency     tacrolimus capsule 0.5 mg      -- Oral 2 times daily        Steroids:   Hormones (From admission, onward)    Start     Stop Route Frequency Ordered    03/04/19 0900  predniSONE tablet 5 mg      -- Oral Daily 03/03/19 1517        Pressors:    Autonomic Drugs (From admission, onward)    None        Hyperglycemia/Diabetes Medications:   Antihyperglycemics (From admission, onward)    Start     Stop Route Frequency Ordered    03/07/19 2100  insulin detemir U-100 pen 13 Units      -- SubQ Nightly 03/07/19 1048    03/07/19 1645  insulin aspart U-100 pen 7 Units      -- SubQ 3 times daily with meals 03/07/19 1508    03/03/19 1745  insulin aspart U-100 pen 0-5 Units      -- SubQ Before meals & nightly PRN 03/03/19 1645          ASSESSMENT and PLAN    * Cellulitis of right upper extremity    Infection may increase insulin resistance.        Type 2 diabetes mellitus with hyperglycemia, with long-term current use of insulin    BG goal 140-180.     Continue Levemir  13 units q HS  Continue Novolog  7 units with meals   Low dose correction scale - given kidney function  BG monitoring  AC/HS    Discharge planning:   Continue home insulin. However, decrease doses unit-for-unit to correlate with current hospital dosage.   180 - 230 + 1 unit  231- 280  + 2 units  281 - 330 + 3 units  331 - 380 + 4 units      > 380   + 5 units      Have patient send logs and follow-up with Endocrinology discharge clinic as scheduled.       Cytomegalovirus (CMV) viremia    Managed per primary team  Avoid hypoglycemia         Immunosuppression    May increase insulin resistance.     Class 1 obesity due to excess calories with serious comorbidity and body mass index (BMI) of 30.0 to 30.9 in adult    may contribute to insulin resistance  Body mass index is 30.87 kg/m².             Delroy Vergara, NP  Endocrinology  [facility]

## 2019-03-10 NOTE — SUBJECTIVE & OBJECTIVE
"Interval HPI:   Overnight events:   NAEON. BG remains within goal on on current SQ insulin regimen. Will discuss post discharge follow-up with patient at bedside. However, Cr remains elevated, which may delay discharge. Prednisone 5 mg.   Eatin%  Nausea: No  Hypoglycemia and intervention: No  Fever: No  TPN and/or TF: No  If yes, type of TF/TPN and rate: None    BP (!) 148/92   Pulse 87   Temp 98 °F (36.7 °C) (Oral)   Resp 13   Ht 5' 11" (1.803 m)   Wt 100.4 kg (221 lb 5.5 oz)   SpO2 97%   BMI 30.87 kg/m²     Labs Reviewed and Include    Recent Labs   Lab 03/10/19  0720   *   CALCIUM 10.2   ALBUMIN 3.0*   PROT 6.5      K 3.5   CO2 31*      BUN 30*   CREATININE 2.2*   ALKPHOS 60   ALT 22   AST 22   BILITOT 0.3     Lab Results   Component Value Date    WBC 9.78 03/10/2019    HGB 9.0 (L) 03/10/2019    HCT 28.2 (L) 03/10/2019    MCV 93 03/10/2019     (H) 03/10/2019     No results for input(s): TSH, FREET4 in the last 168 hours.  Lab Results   Component Value Date    HGBA1C 10.9 (H) 2019       Nutritional status:   Body mass index is 30.87 kg/m².  Lab Results   Component Value Date    ALBUMIN 3.0 (L) 03/10/2019    ALBUMIN 3.1 (L) 2019    ALBUMIN 3.1 (L) 2019     No results found for: PREALBUMIN    Estimated Creatinine Clearance: 45.2 mL/min (A) (based on SCr of 2.2 mg/dL (H)).    Accu-Checks  Recent Labs     19  0751 19  1145 19  1655 19  2102 19  0757 19  1121 19  1626 19  2051 03/10/19  0719 03/10/19  1142   POCTGLUCOSE 120* 127* 193* 169* 151* 166* 155* 154* 129* 165*       Current Medications and/or Treatments Impacting Glycemic Control  Immunotherapy:    Immunosuppressants         Stop Route Frequency     tacrolimus capsule 0.5 mg      -- Oral 2 times daily        Steroids:   Hormones (From admission, onward)    Start     Stop Route Frequency Ordered    19 0900  predniSONE tablet 5 mg      -- Oral Daily " 03/03/19 1517        Pressors:    Autonomic Drugs (From admission, onward)    None        Hyperglycemia/Diabetes Medications:   Antihyperglycemics (From admission, onward)    Start     Stop Route Frequency Ordered    03/07/19 2100  insulin detemir U-100 pen 13 Units      -- SubQ Nightly 03/07/19 1048    03/07/19 1645  insulin aspart U-100 pen 7 Units      -- SubQ 3 times daily with meals 03/07/19 1508    03/03/19 1745  insulin aspart U-100 pen 0-5 Units      -- SubQ Before meals & nightly PRN 03/03/19 1645

## 2019-03-10 NOTE — PROGRESS NOTES
[facility]  Lung Transplant  Progress Note - Floor    Patient Name: Martin Hendrix Jr.  MRN: [id number]  Admission Date: 3/3/2019  Hospital Length of Stay: 7 days  Post-Operative Day: 565  Attending Physician: Samantha Bill DO  Primary Care Provider: Suhki Dunham Jr, MD     Subjective:     Interval History: No acute events overnight.  Patient denies any respiratory complaints of cough and SOB. Denies any further episodes of diarrhea. Plan for repeat NS bolus at 500 cc today. RUE edema stable. Will continue to hold Foscarnet at this time. Resuming tacrolimus at 0.5 mg BID tonight.     Continuous Infusions:  Scheduled Meds:   amLODIPine  5 mg Oral Daily    apixaban  2.5 mg Oral BID    aspirin  81 mg Oral Daily    cefTRIAXone (ROCEPHIN) IVPB  2 g Intravenous Q24H    famotidine  20 mg Oral BID    fluticasone  1 spray Each Nare Daily    folic acid  1,000 mcg Oral Daily    insulin aspart U-100  7 Units Subcutaneous TIDWM    insulin detemir U-100  13 Units Subcutaneous QHS    magnesium chloride  128 mg Oral Daily    potassium chloride SA  20 mEq Oral Daily    pravastatin  20 mg Oral Daily    predniSONE  5 mg Oral Daily    sodium chloride 0.9%  500 mL Intravenous Once    tacrolimus  0.5 mg Oral BID    vancomycin (VANCOCIN) IVPB (custom)  1,000 mg Intravenous Q24H     PRN Meds:acetaminophen, calcium gluconate IVPB, calcium gluconate IVPB, calcium gluconate IVPB, dextrose 50%, dextrose 50%, glucagon (human recombinant), glucose, glucose, insulin aspart U-100, magnesium sulfate IVPB **AND** magnesium sulfate IVPB, ondansetron, potassium chloride 10% **AND** potassium chloride 10% **AND** potassium chloride 10%, promethazine (PHENERGAN) IVPB    Review of patient's allergies indicates:  No Known Allergies    Review of Systems   Constitutional: Negative for chills, diaphoresis and fever.   HENT: Negative for congestion, rhinorrhea, sinus pain, sore throat and voice change.    Eyes:  Negative for discharge and redness.   Respiratory: Negative for cough, shortness of breath and wheezing.    Cardiovascular: Positive for leg swelling. Negative for chest pain.   Gastrointestinal: Negative for abdominal distention, abdominal pain, diarrhea, nausea and vomiting.   Endocrine: Negative for polydipsia and polyuria.   Genitourinary: Negative for dysuria, frequency and urgency.   Musculoskeletal: Negative for back pain, myalgias, neck pain and neck stiffness.   Skin: Negative for color change and pallor.   Allergic/Immunologic: Positive for immunocompromised state.   Neurological: Negative for dizziness, light-headedness and headaches.   Hematological: Negative for adenopathy. Does not bruise/bleed easily.   Psychiatric/Behavioral: Negative for agitation, confusion and decreased concentration.     Objective:   Physical Exam   Constitutional: He is oriented to person, place, and time. He appears well-developed and well-nourished. No distress.   HENT:   Head: Normocephalic and atraumatic.   Right Ear: External ear normal.   Left Ear: External ear normal.   Nose: Nose normal.   Mouth/Throat: Oropharynx is clear and moist.   Eyes: Conjunctivae and EOM are normal. Right eye exhibits no discharge. Left eye exhibits no discharge.   Neck: Normal range of motion. Neck supple. No JVD present.   Cardiovascular: Normal rate, regular rhythm and normal heart sounds. Exam reveals no gallop and no friction rub.   No murmur heard.  Pulmonary/Chest: Effort normal. He has no wheezes. He has no rhonchi. He has no rales.   Well-healed mid-line sternotomy incision   Abdominal: Soft. Bowel sounds are normal. He exhibits no distension. There is no tenderness.   Musculoskeletal: Normal range of motion. He exhibits edema (RUE much improved; minimal BLE).   Neurological: He is alert and oriented to person, place, and time.   Skin: Skin is warm and dry. He is not diaphoretic.   Psychiatric: He has a normal mood and affect. His  behavior is normal.   Nursing note and vitals reviewed.        Vital Signs (Most Recent):  Temp: 97.6 °F (36.4 °C) (03/10/19 1220)  Pulse: 87 (03/10/19 1104)  Resp: 13 (03/10/19 1104)  BP: (!) 148/92 (03/10/19 1104)  SpO2: 97 % (03/10/19 1104) Vital Signs (24h Range):  Temp:  [97.6 °F (36.4 °C)-98.2 °F (36.8 °C)] 97.6 °F (36.4 °C)  Pulse:  [77-99] 87  Resp:  [13-19] 13  SpO2:  [90 %-99 %] 97 %  BP: (120-155)/(75-94) 148/92     Weight: 100.4 kg (221 lb 5.5 oz)  Body mass index is 30.87 kg/m².      Intake/Output Summary (Last 24 hours) at 3/10/2019 1239  Last data filed at 3/10/2019 1100  Gross per 24 hour   Intake 2110 ml   Output 3575 ml   Net -1465 ml       Significant Labs:  CBC:  Recent Labs   Lab 03/10/19  0721   WBC 9.78   RBC 3.03*   HGB 9.0*   HCT 28.2*   *   MCV 93   MCH 29.7   MCHC 31.9*     BMP:  Recent Labs   Lab 03/10/19  0720      K 3.5      CO2 31*   BUN 30*   CREATININE 2.2*   CALCIUM 10.2      Tacrolimus Levels:  Recent Labs   Lab 03/10/19  0721   TACROLIMUS <1.5*     Microbiology:  Microbiology Results (last 7 days)     Procedure Component Value Units Date/Time    Blood culture #2 **CANNOT BE ORDERED STAT** [[id number]] Collected:  03/03/19 1443    Order Status:  Completed Specimen:  Blood from Peripheral, Wrist, Left Updated:  03/08/19 1612     Blood Culture, Routine No growth after 5 days.    Blood culture #1 **CANNOT BE ORDERED STAT** [[id number]] Collected:  03/03/19 1352    Order Status:  Completed Specimen:  Blood from Peripheral, Antecubital, Left Updated:  03/08/19 1612     Blood Culture, Routine No growth after 5 days.    IV catheter culture [[id number]] Collected:  03/03/19 1502    Order Status:  Completed Specimen:  Catheter Tip, PICC Updated:  03/06/19 0933     Aerobic Culture - Cath tip No growth    Clostridium difficile EIA [[id number]] Collected:  03/03/19 2106    Order Status:  Completed Specimen:  Stool Updated:  03/04/19 0353     C. diff Antigen Negative     C  difficile Toxins A+B, EIA Negative     Comment: Testing not recommended for children <24 months old.             I have reviewed all pertinent labs within the past 24 hours.        Assessment/Plan:     * Cellulitis of right upper extremity    Initially had a WBC of 21, which was concerning for possible bacteremia versus cellulitis from PICC line. PICC removed while in ER. Tip sent for culture with NGTD. Blood cultures x2 with NGTD. Procalcitonin significantly elevated at 11.32 at admit.  Was on IV vancomycin and cefepime, but Cefepime changed to ceftriaxone per ID recs.  Will be treated for a full 14 days.      Will likely need a PICC placed prior to discharge. Will look to have PICC placed once creatine improves. Will give repeat 500 mL normal saline bolus today. Right arm swelling is decreased from admission and erythema has resolved.          Acute kidney injury    Cr 2.3 today and continues to slowly downtrend. Will continue to hold Foscarnet infusions until Cr and electrolyte imbalances stable.  Will give repeat 500 mL normal saline bolus today. Will continue to hold Bactrim DS. Tacrolimus resumed at 0.5 mg BID today as levels have become undetectable. Will continue to monitor Cr on serial CMP while inpatient.      Lung replaced by transplant    Patient is s/p BLT 18 months ago secondary to sarcoidosis with associated pulmonary hypertension. 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 Last Foscarnet infusion on 3/2 PM dose.      Tacrolimus resumed at 0.5 mg BID on 3/10 PM. Continue prednisone 5 mg daily.  Maintain O2 sats > 88% throughout the day. Encourage ambulation.      Immunosuppression    Current prednisone 5 mg daily.  Tacrolimus resumed at  0.5 mg BID on 3/10 PM.  Will monitor daily tacrolimus levels daily while inpatient. 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     Prophylactic antibiotic    Will continue to hold Bactrim due to nephrotoxic side effects.      Cytomegalovirus (CMV) viremia    Started on Foscarnet IV infusions BID on 2/13/2019 and has been closely followed by ID, Dr. Lowry.  Will continue to hold Foscarnet infusions for now while awaiting resolution of AVILA and electrolyte imbalances. Most recent CMV PCR on 3/3 was 63.  ID following, appreciate recs.         Hypokalemia    Secondary to Foscarnet therapy. Continue with KCl 20 mEq daily.  Continue to monitor.     Hypomagnesemia    Secondary to Foscarnet infusions. Continue to monitor and replace as needed.       Type 2 diabetes mellitus with hyperglycemia, with long-term current use of insulin    Endocrine consulted, appreciate recs.    Appreciate discharge recommendations if any medication changes are needed as patient will likely discharge early this week.      Acute deep vein thrombosis (DVT)    Right brachial vein with partially occlusive thrombosis and small nonocclusive thrombus in the basilic vein. Occlusive thrombosis in the left greater saphenous vein also noted.  Continue apixaban 2.5 mg BID (started on 3/5).          Patricia Fowler PA-C  Lung Transplant  [facility]

## 2019-03-10 NOTE — ASSESSMENT & PLAN NOTE
BG goal 140-180.     Continue Levemir  13 units q HS  Continue Novolog  7 units with meals   Low dose correction scale - given kidney function  BG monitoring AC/HS    Discharge planning:   Continue home insulin. However, decrease doses unit-for-unit to correlate with current hospital dosage.   180 - 230 + 1 unit  231- 280  + 2 units  281 - 330 + 3 units  331 - 380 + 4 units      > 380   + 5 units      Have patient send logs and follow-up with Endocrinology discharge clinic as scheduled.

## 2019-03-10 NOTE — SUBJECTIVE & OBJECTIVE
Subjective:     Interval History: No acute events overnight.  Patient denies any respiratory complaints of cough and SOB. Denies any further episodes of diarrhea. Plan for repeat NS bolus at 500 cc today. RUE edema stable. Will continue to hold Foscarnet at this time. Resuming tacrolimus at 0.5 mg BID tonight.     Continuous Infusions:  Scheduled Meds:   amLODIPine  5 mg Oral Daily    apixaban  2.5 mg Oral BID    aspirin  81 mg Oral Daily    cefTRIAXone (ROCEPHIN) IVPB  2 g Intravenous Q24H    famotidine  20 mg Oral BID    fluticasone  1 spray Each Nare Daily    folic acid  1,000 mcg Oral Daily    insulin aspart U-100  7 Units Subcutaneous TIDWM    insulin detemir U-100  13 Units Subcutaneous QHS    magnesium chloride  128 mg Oral Daily    potassium chloride SA  20 mEq Oral Daily    pravastatin  20 mg Oral Daily    predniSONE  5 mg Oral Daily    sodium chloride 0.9%  500 mL Intravenous Once    tacrolimus  0.5 mg Oral BID    vancomycin (VANCOCIN) IVPB (custom)  1,000 mg Intravenous Q24H     PRN Meds:acetaminophen, calcium gluconate IVPB, calcium gluconate IVPB, calcium gluconate IVPB, dextrose 50%, dextrose 50%, glucagon (human recombinant), glucose, glucose, insulin aspart U-100, magnesium sulfate IVPB **AND** magnesium sulfate IVPB, ondansetron, potassium chloride 10% **AND** potassium chloride 10% **AND** potassium chloride 10%, promethazine (PHENERGAN) IVPB    Review of patient's allergies indicates:  No Known Allergies    Review of Systems   Constitutional: Negative for chills, diaphoresis and fever.   HENT: Negative for congestion, rhinorrhea, sinus pain, sore throat and voice change.    Eyes: Negative for discharge and redness.   Respiratory: Negative for cough, shortness of breath and wheezing.    Cardiovascular: Positive for leg swelling. Negative for chest pain.   Gastrointestinal: Negative for abdominal distention, abdominal pain, diarrhea, nausea and vomiting.   Endocrine: Negative for  polydipsia and polyuria.   Genitourinary: Negative for dysuria, frequency and urgency.   Musculoskeletal: Negative for back pain, myalgias, neck pain and neck stiffness.   Skin: Negative for color change and pallor.   Allergic/Immunologic: Positive for immunocompromised state.   Neurological: Negative for dizziness, light-headedness and headaches.   Hematological: Negative for adenopathy. Does not bruise/bleed easily.   Psychiatric/Behavioral: Negative for agitation, confusion and decreased concentration.     Objective:   Physical Exam   Constitutional: He is oriented to person, place, and time. He appears well-developed and well-nourished. No distress.   HENT:   Head: Normocephalic and atraumatic.   Right Ear: External ear normal.   Left Ear: External ear normal.   Nose: Nose normal.   Mouth/Throat: Oropharynx is clear and moist.   Eyes: Conjunctivae and EOM are normal. Right eye exhibits no discharge. Left eye exhibits no discharge.   Neck: Normal range of motion. Neck supple. No JVD present.   Cardiovascular: Normal rate, regular rhythm and normal heart sounds. Exam reveals no gallop and no friction rub.   No murmur heard.  Pulmonary/Chest: Effort normal. He has no wheezes. He has no rhonchi. He has no rales.   Well-healed mid-line sternotomy incision   Abdominal: Soft. Bowel sounds are normal. He exhibits no distension. There is no tenderness.   Musculoskeletal: Normal range of motion. He exhibits edema (RUE much improved; minimal BLE).   Neurological: He is alert and oriented to person, place, and time.   Skin: Skin is warm and dry. He is not diaphoretic.   Psychiatric: He has a normal mood and affect. His behavior is normal.   Nursing note and vitals reviewed.        Vital Signs (Most Recent):  Temp: 97.6 °F (36.4 °C) (03/10/19 1220)  Pulse: 87 (03/10/19 1104)  Resp: 13 (03/10/19 1104)  BP: (!) 148/92 (03/10/19 1104)  SpO2: 97 % (03/10/19 1104) Vital Signs (24h Range):  Temp:  [97.6 °F (36.4 °C)-98.2 °F (36.8  °C)] 97.6 °F (36.4 °C)  Pulse:  [77-99] 87  Resp:  [13-19] 13  SpO2:  [90 %-99 %] 97 %  BP: (120-155)/(75-94) 148/92     Weight: 100.4 kg (221 lb 5.5 oz)  Body mass index is 30.87 kg/m².      Intake/Output Summary (Last 24 hours) at 3/10/2019 1239  Last data filed at 3/10/2019 1100  Gross per 24 hour   Intake 2110 ml   Output 3575 ml   Net -1465 ml       Significant Labs:  CBC:  Recent Labs   Lab 03/10/19  0721   WBC 9.78   RBC 3.03*   HGB 9.0*   HCT 28.2*   *   MCV 93   MCH 29.7   MCHC 31.9*     BMP:  Recent Labs   Lab 03/10/19  0720      K 3.5      CO2 31*   BUN 30*   CREATININE 2.2*   CALCIUM 10.2      Tacrolimus Levels:  Recent Labs   Lab 03/10/19  0721   TACROLIMUS <1.5*     Microbiology:  Microbiology Results (last 7 days)     Procedure Component Value Units Date/Time    Blood culture #2 **CANNOT BE ORDERED STAT** [[id number]] Collected:  03/03/19 1443    Order Status:  Completed Specimen:  Blood from Peripheral, Wrist, Left Updated:  03/08/19 1612     Blood Culture, Routine No growth after 5 days.    Blood culture #1 **CANNOT BE ORDERED STAT** [[id number]] Collected:  03/03/19 1352    Order Status:  Completed Specimen:  Blood from Peripheral, Antecubital, Left Updated:  03/08/19 1612     Blood Culture, Routine No growth after 5 days.    IV catheter culture [[id number]] Collected:  03/03/19 1502    Order Status:  Completed Specimen:  Catheter Tip, PICC Updated:  03/06/19 0933     Aerobic Culture - Cath tip No growth    Clostridium difficile EIA [[id number]] Collected:  03/03/19 2106    Order Status:  Completed Specimen:  Stool Updated:  03/04/19 0353     C. diff Antigen Negative     C difficile Toxins A+B, EIA Negative     Comment: Testing not recommended for children <24 months old.             I have reviewed all pertinent labs within the past 24 hours.

## 2019-03-10 NOTE — ASSESSMENT & PLAN NOTE
Cr 2.3 today and continues to slowly downtrend. Will continue to hold Foscarnet infusions until Cr and electrolyte imbalances stable.  Will give repeat 500 mL normal saline bolus today. Will continue to hold Bactrim DS. Tacrolimus resumed at 0.5 mg BID today as levels have become undetectable. Will continue to monitor Cr on serial CMP while inpatient.

## 2019-03-10 NOTE — PLAN OF CARE
Problem: Adult Inpatient Plan of Care  Goal: Plan of Care Review  Outcome: Ongoing (interventions implemented as appropriate)  Pt AAOx4, VSS, afebrile.  Currently denies c/o pain.  Fall risk precautions initiated.  Pt in lowest bed position setting, lighting adjusted, pt to wear nonskid socks when ambulating, side rails up x2.  Pt remain free from falls during shift.  Pt verbalize understanding to call when needed assistance. Call light within reach.  Will continue to monitor.

## 2019-03-10 NOTE — ASSESSMENT & PLAN NOTE
Initially had a WBC of 21, which was concerning for possible bacteremia versus cellulitis from PICC line. PICC removed while in ER. Tip sent for culture with NGTD. Blood cultures x2 with NGTD. Procalcitonin significantly elevated at 11.32 at admit.  Was on IV vancomycin and cefepime, but Cefepime changed to ceftriaxone per ID recs.  Will be treated for a full 14 days.      Will likely need a PICC placed prior to discharge. Will look to have PICC placed once creatine improves. Will give repeat 500 mL normal saline bolus today. Right arm swelling is decreased from admission and erythema has resolved.

## 2019-03-10 NOTE — ASSESSMENT & PLAN NOTE
Patient is s/p BLT 18 months ago secondary to sarcoidosis with associated pulmonary hypertension. 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 Last Foscarnet infusion on 3/2 PM dose.      Tacrolimus resumed at 0.5 mg BID on 3/10 PM. Continue prednisone 5 mg daily.  Maintain O2 sats > 88% throughout the day. Encourage ambulation.

## 2019-03-10 NOTE — ASSESSMENT & PLAN NOTE
Current prednisone 5 mg daily.  Tacrolimus resumed at 0.5 mg BID on 3/10 PM.  Will monitor daily tacrolimus levels daily while inpatient. 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

## 2019-03-10 NOTE — TELEPHONE ENCOUNTER
Please insure patient has appointment already scheduled. If not, patient needs to send logs to clinic and/or follow-up in new discharge clinic.     Thanks!    Delroy Vergara, NP

## 2019-03-10 NOTE — ASSESSMENT & PLAN NOTE
Endocrine consulted, appreciate recs.    Appreciate discharge recommendations if any medication changes are needed as patient will likely discharge early this week.

## 2019-03-10 NOTE — PLAN OF CARE
Problem: Adult Inpatient Plan of Care  Goal: Plan of Care Review  - Patient is AAOx4, independent and ambulatory. Patient educated to use call bell for assistance, verbalized understanding.   - Afebrile, WBC 11.04, continuing IV vanc and rocephin   - CR 2.3 - urine output 150 cc measured, 5 unmeasured occurrences. Patient instructed to measure urine per urinal.   - Accuchecks AC/HS - mealtime insulin administered per order   - No complaints of pain   - See flow sheet for complete assessment details

## 2019-03-11 PROBLEM — E87.6 HYPOKALEMIA: Status: RESOLVED | Noted: 2017-08-22 | Resolved: 2019-03-11

## 2019-03-11 LAB
ALBUMIN SERPL BCP-MCNC: 3.1 G/DL
ALP SERPL-CCNC: 65 U/L
ALT SERPL W/O P-5'-P-CCNC: 24 U/L
ANION GAP SERPL CALC-SCNC: 9 MMOL/L
ANISOCYTOSIS BLD QL SMEAR: SLIGHT
AST SERPL-CCNC: 21 U/L
BASOPHILS NFR BLD: 0 %
BILIRUB SERPL-MCNC: 0.3 MG/DL
BUN SERPL-MCNC: 27 MG/DL
CA-I BLDV-SCNC: 2.21 MMOL/L
CALCIUM SERPL-MCNC: 10.1 MG/DL
CHLORIDE SERPL-SCNC: 101 MMOL/L
CO2 SERPL-SCNC: 30 MMOL/L
CREAT SERPL-MCNC: 2.2 MG/DL
DIFFERENTIAL METHOD: ABNORMAL
EOSINOPHIL NFR BLD: 3 %
ERYTHROCYTE [DISTWIDTH] IN BLOOD BY AUTOMATED COUNT: 15.5 %
EST. GFR  (AFRICAN AMERICAN): 37.3 ML/MIN/1.73 M^2
EST. GFR  (NON AFRICAN AMERICAN): 32.3 ML/MIN/1.73 M^2
GLUCOSE SERPL-MCNC: 100 MG/DL
HCT VFR BLD AUTO: 32.9 %
HGB BLD-MCNC: 10.4 G/DL
HYPOCHROMIA BLD QL SMEAR: ABNORMAL
IMM GRANULOCYTES # BLD AUTO: ABNORMAL K/UL
IMM GRANULOCYTES NFR BLD AUTO: ABNORMAL %
LYMPHOCYTES NFR BLD: 9 %
MAGNESIUM SERPL-MCNC: 1.5 MG/DL
MCH RBC QN AUTO: 29.8 PG
MCHC RBC AUTO-ENTMCNC: 31.6 G/DL
MCV RBC AUTO: 94 FL
MONOCYTES NFR BLD: 2 %
NEUTROPHILS NFR BLD: 84 %
NEUTS BAND NFR BLD MANUAL: 2 %
NRBC BLD-RTO: 0 /100 WBC
OVALOCYTES BLD QL SMEAR: ABNORMAL
PHOSPHATE SERPL-MCNC: 4.7 MG/DL
PLATELET # BLD AUTO: 438 K/UL
PLATELET BLD QL SMEAR: ABNORMAL
PMV BLD AUTO: 8.7 FL
POCT GLUCOSE: 131 MG/DL (ref 70–110)
POCT GLUCOSE: 136 MG/DL (ref 70–110)
POCT GLUCOSE: 187 MG/DL (ref 70–110)
POIKILOCYTOSIS BLD QL SMEAR: SLIGHT
POLYCHROMASIA BLD QL SMEAR: ABNORMAL
POTASSIUM SERPL-SCNC: 3.6 MMOL/L
PROT SERPL-MCNC: 6.8 G/DL
RBC # BLD AUTO: 3.49 M/UL
SODIUM SERPL-SCNC: 140 MMOL/L
TACROLIMUS BLD-MCNC: <1.5 NG/ML
VANCOMYCIN SERPL-MCNC: 21.9 UG/ML
WBC # BLD AUTO: 10.65 K/UL

## 2019-03-11 PROCEDURE — 84100 ASSAY OF PHOSPHORUS: CPT

## 2019-03-11 PROCEDURE — 85027 COMPLETE CBC AUTOMATED: CPT

## 2019-03-11 PROCEDURE — 99231 PR SUBSEQUENT HOSPITAL CARE,LEVL I: ICD-10-PCS | Mod: ,,, | Performed by: NURSE PRACTITIONER

## 2019-03-11 PROCEDURE — 80202 ASSAY OF VANCOMYCIN: CPT

## 2019-03-11 PROCEDURE — 83735 ASSAY OF MAGNESIUM: CPT

## 2019-03-11 PROCEDURE — 63600175 PHARM REV CODE 636 W HCPCS: Performed by: NURSE PRACTITIONER

## 2019-03-11 PROCEDURE — 25000003 PHARM REV CODE 250: Performed by: PHYSICIAN ASSISTANT

## 2019-03-11 PROCEDURE — 82330 ASSAY OF CALCIUM: CPT

## 2019-03-11 PROCEDURE — 80197 ASSAY OF TACROLIMUS: CPT

## 2019-03-11 PROCEDURE — 63600175 PHARM REV CODE 636 W HCPCS: Performed by: PHYSICIAN ASSISTANT

## 2019-03-11 PROCEDURE — 20600001 HC STEP DOWN PRIVATE ROOM

## 2019-03-11 PROCEDURE — 80053 COMPREHEN METABOLIC PANEL: CPT

## 2019-03-11 PROCEDURE — 36415 COLL VENOUS BLD VENIPUNCTURE: CPT

## 2019-03-11 PROCEDURE — 99232 PR SUBSEQUENT HOSPITAL CARE,LEVL II: ICD-10-PCS | Mod: ,,, | Performed by: INTERNAL MEDICINE

## 2019-03-11 PROCEDURE — 25000003 PHARM REV CODE 250: Performed by: INTERNAL MEDICINE

## 2019-03-11 PROCEDURE — 99232 SBSQ HOSP IP/OBS MODERATE 35: CPT | Mod: ,,, | Performed by: INTERNAL MEDICINE

## 2019-03-11 PROCEDURE — 99231 SBSQ HOSP IP/OBS SF/LOW 25: CPT | Mod: ,,, | Performed by: NURSE PRACTITIONER

## 2019-03-11 PROCEDURE — 85007 BL SMEAR W/DIFF WBC COUNT: CPT

## 2019-03-11 RX ORDER — LANOLIN ALCOHOL/MO/W.PET/CERES
400 CREAM (GRAM) TOPICAL ONCE
Status: COMPLETED | OUTPATIENT
Start: 2019-03-11 | End: 2019-03-11

## 2019-03-11 RX ORDER — POTASSIUM CHLORIDE 20 MEQ/1
40 TABLET, EXTENDED RELEASE ORAL ONCE
Status: COMPLETED | OUTPATIENT
Start: 2019-03-11 | End: 2019-03-11

## 2019-03-11 RX ORDER — LANOLIN ALCOHOL/MO/W.PET/CERES
400 CREAM (GRAM) TOPICAL EVERY 4 HOURS
Status: DISCONTINUED | OUTPATIENT
Start: 2019-03-11 | End: 2019-03-11

## 2019-03-11 RX ADMIN — TACROLIMUS 0.5 MG: 0.5 CAPSULE ORAL at 06:03

## 2019-03-11 RX ADMIN — AMLODIPINE BESYLATE 5 MG: 5 TABLET ORAL at 08:03

## 2019-03-11 RX ADMIN — MAGNESIUM 64 MG (MAGNESIUM CHLORIDE) TABLET,DELAYED RELEASE 64 MG: at 08:03

## 2019-03-11 RX ADMIN — POTASSIUM CHLORIDE 20 MEQ: 1500 TABLET, EXTENDED RELEASE ORAL at 08:03

## 2019-03-11 RX ADMIN — POTASSIUM CHLORIDE 40 MEQ: 1500 TABLET, EXTENDED RELEASE ORAL at 12:03

## 2019-03-11 RX ADMIN — MAGNESIUM OXIDE TAB 400 MG (241.3 MG ELEMENTAL MG) 400 MG: 400 (241.3 MG) TAB at 12:03

## 2019-03-11 RX ADMIN — PRAVASTATIN SODIUM 20 MG: 20 TABLET ORAL at 08:03

## 2019-03-11 RX ADMIN — TACROLIMUS 0.5 MG: 0.5 CAPSULE ORAL at 08:03

## 2019-03-11 RX ADMIN — PREDNISONE 5 MG: 5 TABLET ORAL at 08:03

## 2019-03-11 RX ADMIN — INSULIN ASPART 7 UNITS: 100 INJECTION, SOLUTION INTRAVENOUS; SUBCUTANEOUS at 06:03

## 2019-03-11 RX ADMIN — INSULIN ASPART 7 UNITS: 100 INJECTION, SOLUTION INTRAVENOUS; SUBCUTANEOUS at 08:03

## 2019-03-11 RX ADMIN — FLUTICASONE PROPIONATE 50 MCG: 50 SPRAY, METERED NASAL at 08:03

## 2019-03-11 RX ADMIN — INSULIN ASPART 7 UNITS: 100 INJECTION, SOLUTION INTRAVENOUS; SUBCUTANEOUS at 12:03

## 2019-03-11 RX ADMIN — FAMOTIDINE 20 MG: 20 TABLET ORAL at 08:03

## 2019-03-11 RX ADMIN — ASPIRIN 81 MG: 81 TABLET, COATED ORAL at 08:03

## 2019-03-11 RX ADMIN — MAGNESIUM 64 MG (MAGNESIUM CHLORIDE) TABLET,DELAYED RELEASE 128 MG: at 08:03

## 2019-03-11 RX ADMIN — FOLIC ACID 1000 MCG: 1 TABLET ORAL at 08:03

## 2019-03-11 RX ADMIN — CEFTRIAXONE 2 G: 2 INJECTION, SOLUTION INTRAVENOUS at 02:03

## 2019-03-11 RX ADMIN — APIXABAN 2.5 MG: 2.5 TABLET, FILM COATED ORAL at 08:03

## 2019-03-11 NOTE — ASSESSMENT & PLAN NOTE
Initially had a WBC of 21, which was concerning for possible bacteremia versus cellulitis from PICC line. PICC removed while in ER. Tip sent for culture with NGTD. Blood cultures x2 with NGTD. Procalcitonin significantly elevated at 11.32 at admit.  Was on IV vancomycin and cefepime, but Cefepime changed to ceftriaxone per ID recs on 3/8. Vanc d/c'd 3/11.     Continue ceftriaxone x 14 days.

## 2019-03-11 NOTE — ASSESSMENT & PLAN NOTE
Creatinine 2.1 and continues to slowly downtrend. Continue to trend inpatient and avoid nephrotoxic medications. Will resume Foscarnet infusions outpatient.

## 2019-03-11 NOTE — SUBJECTIVE & OBJECTIVE
Subjective:     Interval History: No acute events overnight. Right upper extremity edema improving. Continues to report bilateral lower extremity edema unchanged since admission. Otherwise feels well today with no respiratory complaints.     Continuous Infusions:  Scheduled Meds:   amLODIPine  5 mg Oral Daily    aspirin  81 mg Oral Daily    cefTRIAXone (ROCEPHIN) IVPB  2 g Intravenous Q24H    famotidine  20 mg Oral BID    fluticasone  1 spray Each Nare Daily    folic acid  1,000 mcg Oral Daily    insulin aspart U-100  7 Units Subcutaneous TIDWM    insulin detemir U-100  13 Units Subcutaneous QHS    magnesium chloride  128 mg Oral Daily    potassium chloride SA  20 mEq Oral Daily    pravastatin  20 mg Oral Daily    predniSONE  5 mg Oral Daily    tacrolimus  0.5 mg Oral BID     PRN Meds:acetaminophen, calcium gluconate IVPB, calcium gluconate IVPB, calcium gluconate IVPB, dextrose 50%, dextrose 50%, glucagon (human recombinant), glucose, glucose, insulin aspart U-100, magnesium sulfate IVPB **AND** magnesium sulfate IVPB, ondansetron, potassium chloride 10% **AND** potassium chloride 10% **AND** potassium chloride 10%, promethazine (PHENERGAN) IVPB    Review of patient's allergies indicates:  No Known Allergies    Review of Systems   Constitutional: Negative for chills, diaphoresis and fever.   HENT: Negative for congestion, rhinorrhea, sinus pain, sore throat and voice change.    Eyes: Negative for discharge and redness.   Respiratory: Negative for cough, shortness of breath and wheezing.    Cardiovascular: Positive for leg swelling. Negative for chest pain.   Gastrointestinal: Negative for abdominal distention, abdominal pain, diarrhea, nausea and vomiting.   Endocrine: Negative for polydipsia and polyuria.   Genitourinary: Negative for dysuria, frequency and urgency.   Musculoskeletal: Negative for back pain, myalgias, neck pain and neck stiffness.   Skin: Negative for color change and pallor.    Allergic/Immunologic: Positive for immunocompromised state.   Neurological: Negative for dizziness, light-headedness and headaches.   Hematological: Negative for adenopathy. Does not bruise/bleed easily.   Psychiatric/Behavioral: Negative for agitation, confusion and decreased concentration.     Objective:   Physical Exam   Constitutional: He is oriented to person, place, and time. He appears well-developed and well-nourished. No distress.   HENT:   Head: Normocephalic and atraumatic.   Right Ear: External ear normal.   Left Ear: External ear normal.   Nose: Nose normal.   Mouth/Throat: Oropharynx is clear and moist.   Eyes: Conjunctivae and EOM are normal. Right eye exhibits no discharge. Left eye exhibits no discharge.   Neck: Normal range of motion. Neck supple. No JVD present.   Cardiovascular: Normal rate, regular rhythm and normal heart sounds. Exam reveals no gallop and no friction rub.   No murmur heard.  Pulmonary/Chest: Effort normal. He has no wheezes. He has no rhonchi. He has no rales.   Well-healed mid-line sternotomy incision   Abdominal: Soft. Bowel sounds are normal. He exhibits no distension. There is no tenderness.   Musculoskeletal: Normal range of motion. He exhibits edema (RUE improving, no erythema/induration; trace edema bilateral lower extremities ).   Neurological: He is alert and oriented to person, place, and time.   Skin: Skin is warm and dry. He is not diaphoretic.   Psychiatric: He has a normal mood and affect. His behavior is normal.   Nursing note and vitals reviewed.        Vital Signs (Most Recent):  Temp: 97.4 °F (36.3 °C) (03/11/19 0731)  Pulse: 100 (03/11/19 0731)  Resp: 18 (03/11/19 0731)  BP: (!) 145/80 (03/11/19 0731)  SpO2: 98 % (03/11/19 0731) Vital Signs (24h Range):  Temp:  [97.4 °F (36.3 °C)-98.3 °F (36.8 °C)] 97.4 °F (36.3 °C)  Pulse:  [] 100  Resp:  [13-19] 18  SpO2:  [95 %-99 %] 98 %  BP: (134-148)/(80-92) 145/80     Weight: 101 kg (222 lb 10.6 oz)  Body mass  index is 31.06 kg/m².      Intake/Output Summary (Last 24 hours) at 3/11/2019 1059  Last data filed at 3/11/2019 0837  Gross per 24 hour   Intake 2860 ml   Output 3475 ml   Net -615 ml       Significant Labs:  CBC:  Recent Labs   Lab 03/11/19  0630   WBC 10.65   RBC 3.49*   HGB 10.4*   HCT 32.9*   *   MCV 94   MCH 29.8   MCHC 31.6*     BMP:  Recent Labs   Lab 03/11/19  0630      K 3.6      CO2 30*   BUN 27*   CREATININE 2.2*   CALCIUM 10.1      Tacrolimus Levels:  Recent Labs   Lab 03/11/19  0630   TACROLIMUS <1.5*     Microbiology:  Microbiology Results (last 7 days)     Procedure Component Value Units Date/Time    Blood culture #2 **CANNOT BE ORDERED STAT** [[id number]] Collected:  03/03/19 1443    Order Status:  Completed Specimen:  Blood from Peripheral, Wrist, Left Updated:  03/08/19 1612     Blood Culture, Routine No growth after 5 days.    Blood culture #1 **CANNOT BE ORDERED STAT** [[id number]] Collected:  03/03/19 1352    Order Status:  Completed Specimen:  Blood from Peripheral, Antecubital, Left Updated:  03/08/19 1612     Blood Culture, Routine No growth after 5 days.    IV catheter culture [[id number]] Collected:  03/03/19 1502    Order Status:  Completed Specimen:  Catheter Tip, PICC Updated:  03/06/19 0933     Aerobic Culture - Cath tip No growth          I have reviewed all pertinent labs within the past 24 hours.

## 2019-03-11 NOTE — ASSESSMENT & PLAN NOTE
Apixaban started 3/5 for RUE and LLE DVT. Will continue to hold for tunneled cath placement tomorrow at 1300. Will resume apixaban as outpatient.

## 2019-03-11 NOTE — ASSESSMENT & PLAN NOTE
Creatinine stable. Continue to trend inpatient and avoid nephrotoxic medications. Will resume foscarnet infusions outpatient.

## 2019-03-11 NOTE — PLAN OF CARE
Pt aaox3.  Bed in low and locked position.  Nonskid footwear in use.  Independently ambulatory around room and halls throughout day.  Denies pain.  Call bell, phone, food, drink, urinal within reach in bed or on bedside table.  Aware to call if needing assistance.  Accuchecks ac/hs with mealtime insulin given.  IV vanc dc kay briggs rounds.  Mag and K replaced for am labs (mg 1.5, K 3.6). Right arm with limb alert, still edematous and slightly red, but per pt improved.  New PIV inserted today after previous one infiltrated.   eloquis on hold for line placement later this week.  NPO past midnight in case able to do analilia.  See flowsheet for full assessment and details.

## 2019-03-11 NOTE — ASSESSMENT & PLAN NOTE
Tacrolimus held for AVILA and resumed at 0.5 mg BID on 3/10 PM.  Will monitor daily tacrolimus levels daily while inpatient. 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

## 2019-03-11 NOTE — SUBJECTIVE & OBJECTIVE
Subjective:     Interval History: ***    Continuous Infusions:  Scheduled Meds:   amLODIPine  5 mg Oral Daily    aspirin  81 mg Oral Daily    cefTRIAXone (ROCEPHIN) IVPB  2 g Intravenous Q24H    famotidine  20 mg Oral BID    fluticasone  1 spray Each Nare Daily    folic acid  1,000 mcg Oral Daily    insulin aspart U-100  7 Units Subcutaneous TIDWM    insulin detemir U-100  13 Units Subcutaneous QHS    magnesium chloride  128 mg Oral Daily    magnesium chloride  64 mg Oral QHS    potassium chloride SA  20 mEq Oral Daily    pravastatin  20 mg Oral Daily    predniSONE  5 mg Oral Daily    tacrolimus  0.5 mg Oral BID     PRN Meds:acetaminophen, calcium gluconate IVPB, calcium gluconate IVPB, calcium gluconate IVPB, dextrose 50%, dextrose 50%, glucagon (human recombinant), glucose, glucose, insulin aspart U-100, magnesium sulfate IVPB **AND** magnesium sulfate IVPB, ondansetron, potassium chloride 10% **AND** potassium chloride 10% **AND** potassium chloride 10%, promethazine (PHENERGAN) IVPB    Review of patient's allergies indicates:  No Known Allergies    Review of Systems  Objective:   Physical Exam    {Select Objective Section:13739}

## 2019-03-11 NOTE — ASSESSMENT & PLAN NOTE
Initially had a WBC of 21, which was concerning for possible bacteremia versus cellulitis from PICC line. PICC removed while in ER. Tip sent for culture with NGTD. Blood cultures x2 with NGTD. Procalcitonin significantly elevated at 11.32 at admit.  Was on IV vancomycin and cefepime, but Cefepime changed to ceftriaxone per ID recs on 3/8. Vanc d/c'd 3/11.     Continue ceftriaxone x 14 days. IV infusion orders placed.

## 2019-03-11 NOTE — ASSESSMENT & PLAN NOTE
Tacrolimus held for AVILA and resumed at 0.5 mg BID on 3/10 PM.  Will monitor daily tacrolimus levels daily while inpatient. 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 Continue prednisone 5 mg daily.

## 2019-03-11 NOTE — ASSESSMENT & PLAN NOTE
Started on Foscarnet IV infusions BID on 2/13/2019 and has been closely followed by ID, Dr. Lowry.  Will continue to hold Foscarnet infusions for now while awaiting resolution of AVILA and electrolyte imbalances. Most recent CMV PCR on 3/3 was 63. ID following, bernie monzon. Will plan to resume Foscarnet infusions as outpatient.

## 2019-03-11 NOTE — PROGRESS NOTES
[facility]  Lung Transplant  Progress Note - Floor    Patient Name: Martin Hendrix Jr.  MRN: [id number]  Admission Date: 3/3/2019  Hospital Length of Stay: 8 days  Post-Operative Day: 566  Attending Physician: Samantha Bill DO  Primary Care Provider: Sukhi Dunham Jr, MD     Subjective:     Interval History: No acute events overnight. Right upper extremity edema improving. Continues to report bilateral lower extremity edema unchanged since admission. Otherwise feels well today with no respiratory complaints.     Continuous Infusions:  Scheduled Meds:   amLODIPine  5 mg Oral Daily    aspirin  81 mg Oral Daily    cefTRIAXone (ROCEPHIN) IVPB  2 g Intravenous Q24H    famotidine  20 mg Oral BID    fluticasone  1 spray Each Nare Daily    folic acid  1,000 mcg Oral Daily    insulin aspart U-100  7 Units Subcutaneous TIDWM    insulin detemir U-100  13 Units Subcutaneous QHS    magnesium chloride  128 mg Oral Daily    potassium chloride SA  20 mEq Oral Daily    pravastatin  20 mg Oral Daily    predniSONE  5 mg Oral Daily    tacrolimus  0.5 mg Oral BID     PRN Meds:acetaminophen, calcium gluconate IVPB, calcium gluconate IVPB, calcium gluconate IVPB, dextrose 50%, dextrose 50%, glucagon (human recombinant), glucose, glucose, insulin aspart U-100, magnesium sulfate IVPB **AND** magnesium sulfate IVPB, ondansetron, potassium chloride 10% **AND** potassium chloride 10% **AND** potassium chloride 10%, promethazine (PHENERGAN) IVPB    Review of patient's allergies indicates:  No Known Allergies    Review of Systems   Constitutional: Negative for chills, diaphoresis and fever.   HENT: Negative for congestion, rhinorrhea, sinus pain, sore throat and voice change.    Eyes: Negative for discharge and redness.   Respiratory: Negative for cough, shortness of breath and wheezing.    Cardiovascular: Positive for leg swelling. Negative for chest pain.   Gastrointestinal: Negative for abdominal  distention, abdominal pain, diarrhea, nausea and vomiting.   Endocrine: Negative for polydipsia and polyuria.   Genitourinary: Negative for dysuria, frequency and urgency.   Musculoskeletal: Negative for back pain, myalgias, neck pain and neck stiffness.   Skin: Negative for color change and pallor.   Allergic/Immunologic: Positive for immunocompromised state.   Neurological: Negative for dizziness, light-headedness and headaches.   Hematological: Negative for adenopathy. Does not bruise/bleed easily.   Psychiatric/Behavioral: Negative for agitation, confusion and decreased concentration.     Objective:   Physical Exam   Constitutional: He is oriented to person, place, and time. He appears well-developed and well-nourished. No distress.   HENT:   Head: Normocephalic and atraumatic.   Right Ear: External ear normal.   Left Ear: External ear normal.   Nose: Nose normal.   Mouth/Throat: Oropharynx is clear and moist.   Eyes: Conjunctivae and EOM are normal. Right eye exhibits no discharge. Left eye exhibits no discharge.   Neck: Normal range of motion. Neck supple. No JVD present.   Cardiovascular: Normal rate, regular rhythm and normal heart sounds. Exam reveals no gallop and no friction rub.   No murmur heard.  Pulmonary/Chest: Effort normal. He has no wheezes. He has no rhonchi. He has no rales.   Well-healed mid-line sternotomy incision   Abdominal: Soft. Bowel sounds are normal. He exhibits no distension. There is no tenderness.   Musculoskeletal: Normal range of motion. He exhibits edema (RUE improving, no erythema/induration; trace edema bilateral lower extremities ).   Neurological: He is alert and oriented to person, place, and time.   Skin: Skin is warm and dry. He is not diaphoretic.   Psychiatric: He has a normal mood and affect. His behavior is normal.   Nursing note and vitals reviewed.        Vital Signs (Most Recent):  Temp: 97.4 °F (36.3 °C) (03/11/19 0731)  Pulse: 100 (03/11/19 0731)  Resp: 18  (03/11/19 0731)  BP: (!) 145/80 (03/11/19 0731)  SpO2: 98 % (03/11/19 0731) Vital Signs (24h Range):  Temp:  [97.4 °F (36.3 °C)-98.3 °F (36.8 °C)] 97.4 °F (36.3 °C)  Pulse:  [] 100  Resp:  [13-19] 18  SpO2:  [95 %-99 %] 98 %  BP: (134-148)/(80-92) 145/80     Weight: 101 kg (222 lb 10.6 oz)  Body mass index is 31.06 kg/m².      Intake/Output Summary (Last 24 hours) at 3/11/2019 1059  Last data filed at 3/11/2019 0837  Gross per 24 hour   Intake 2860 ml   Output 3475 ml   Net -615 ml       Significant Labs:  CBC:  Recent Labs   Lab 03/11/19  0630   WBC 10.65   RBC 3.49*   HGB 10.4*   HCT 32.9*   *   MCV 94   MCH 29.8   MCHC 31.6*     BMP:  Recent Labs   Lab 03/11/19  0630      K 3.6      CO2 30*   BUN 27*   CREATININE 2.2*   CALCIUM 10.1      Tacrolimus Levels:  Recent Labs   Lab 03/11/19  0630   TACROLIMUS <1.5*     Microbiology:  Microbiology Results (last 7 days)     Procedure Component Value Units Date/Time    Blood culture #2 **CANNOT BE ORDERED STAT** [[id number]] Collected:  03/03/19 1443    Order Status:  Completed Specimen:  Blood from Peripheral, Wrist, Left Updated:  03/08/19 1612     Blood Culture, Routine No growth after 5 days.    Blood culture #1 **CANNOT BE ORDERED STAT** [[id number]] Collected:  03/03/19 1352    Order Status:  Completed Specimen:  Blood from Peripheral, Antecubital, Left Updated:  03/08/19 1612     Blood Culture, Routine No growth after 5 days.    IV catheter culture [[id number]] Collected:  03/03/19 1502    Order Status:  Completed Specimen:  Catheter Tip, PICC Updated:  03/06/19 0933     Aerobic Culture - Cath tip No growth          I have reviewed all pertinent labs within the past 24 hours.        Assessment/Plan:     * Cellulitis of right upper extremity    Initially had a WBC of 21, which was concerning for possible bacteremia versus cellulitis from PICC line. PICC removed while in ER. Tip sent for culture with NGTD. Blood cultures x2 with NGTD.  Procalcitonin significantly elevated at 11.32 at admit.  Was on IV vancomycin and cefepime, but Cefepime changed to ceftriaxone per ID recs on 3/8. Vanc d/c'd 3/11.     Continue ceftriaxone x 14 days.      Lung replaced by transplant    Patient is s/p BLT 18 months ago secondary to sarcoidosis with associated pulmonary hypertension. 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 Last Foscarnet infusion on 3/2 PM dose.    Continue immunosuppression and ppx.      Immunosuppression    Tacrolimus held for AVILA and resumed at 0.5 mg BID on 3/10 PM.  Will monitor daily tacrolimus levels daily while inpatient. 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     Prophylactic antibiotic    Continue to hold bactrim in setting of AVILA.     Cytomegalovirus (CMV) viremia    Started on Foscarnet IV infusions BID on 2/13/2019 and has been closely followed by ID, Dr. Lowry.  Will continue to hold Foscarnet infusions for now while awaiting resolution of AVILA and electrolyte imbalances. Most recent CMV PCR on 3/3 was 63. ID following, appreciate recs.     Type 2 diabetes mellitus with hyperglycemia, with long-term current use of insulin    Endocrine consulted, appreciate recs.     Acute kidney injury    Creatinine stable. Continue to trend inpatient and avoid nephrotoxic medications. Will resume foscarnet infusions outpatient.      Hypomagnesemia    Secondary to Foscarnet infusions. Continue to monitor and replace as needed.     Acute deep vein thrombosis (DVT)    Apixaban started 3/5 for RUE and LLE DVT. Will hold for tunneled cath placement.          Teresa Trejo PA-C  Lung Transplant  [facility]

## 2019-03-11 NOTE — ASSESSMENT & PLAN NOTE
Patient is s/p BLT 18 months ago secondary to sarcoidosis with associated pulmonary hypertension. 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 Last Foscarnet infusion on 3/2 PM dose.    Continue immunosuppression and ppx.

## 2019-03-12 LAB
ALBUMIN SERPL BCP-MCNC: 2.9 G/DL
ALP SERPL-CCNC: 61 U/L
ALT SERPL W/O P-5'-P-CCNC: 26 U/L
ANION GAP SERPL CALC-SCNC: 10 MMOL/L
AST SERPL-CCNC: 26 U/L
BASOPHILS # BLD AUTO: 0.12 K/UL
BASOPHILS NFR BLD: 1.4 %
BILIRUB SERPL-MCNC: 0.3 MG/DL
BUN SERPL-MCNC: 25 MG/DL
CA-I BLDV-SCNC: 1.14 MMOL/L
CALCIUM SERPL-MCNC: 9.6 MG/DL
CHLORIDE SERPL-SCNC: 102 MMOL/L
CO2 SERPL-SCNC: 29 MMOL/L
CREAT SERPL-MCNC: 2.1 MG/DL
DIFFERENTIAL METHOD: ABNORMAL
EOSINOPHIL # BLD AUTO: 0.3 K/UL
EOSINOPHIL NFR BLD: 3.2 %
ERYTHROCYTE [DISTWIDTH] IN BLOOD BY AUTOMATED COUNT: 15.4 %
EST. GFR  (AFRICAN AMERICAN): 39.5 ML/MIN/1.73 M^2
EST. GFR  (NON AFRICAN AMERICAN): 34.2 ML/MIN/1.73 M^2
GLUCOSE SERPL-MCNC: 114 MG/DL
HCT VFR BLD AUTO: 27.8 %
HGB BLD-MCNC: 9 G/DL
IMM GRANULOCYTES # BLD AUTO: 0.41 K/UL
IMM GRANULOCYTES NFR BLD AUTO: 4.7 %
LYMPHOCYTES # BLD AUTO: 0.9 K/UL
LYMPHOCYTES NFR BLD: 9.8 %
MAGNESIUM SERPL-MCNC: 1.5 MG/DL
MCH RBC QN AUTO: 30 PG
MCHC RBC AUTO-ENTMCNC: 32.4 G/DL
MCV RBC AUTO: 93 FL
MONOCYTES # BLD AUTO: 1 K/UL
MONOCYTES NFR BLD: 11 %
NEUTROPHILS # BLD AUTO: 6.1 K/UL
NEUTROPHILS NFR BLD: 69.9 %
NRBC BLD-RTO: 0 /100 WBC
PHOSPHATE SERPL-MCNC: 4.7 MG/DL
PLATELET # BLD AUTO: 377 K/UL
PMV BLD AUTO: 8.5 FL
POCT GLUCOSE: 151 MG/DL (ref 70–110)
POCT GLUCOSE: 187 MG/DL (ref 70–110)
POCT GLUCOSE: 189 MG/DL (ref 70–110)
POTASSIUM SERPL-SCNC: 4.1 MMOL/L
PROT SERPL-MCNC: 6.5 G/DL
RBC # BLD AUTO: 3 M/UL
SODIUM SERPL-SCNC: 141 MMOL/L
TACROLIMUS BLD-MCNC: <1.5 NG/ML
WBC # BLD AUTO: 8.78 K/UL

## 2019-03-12 PROCEDURE — 20600001 HC STEP DOWN PRIVATE ROOM

## 2019-03-12 PROCEDURE — 80197 ASSAY OF TACROLIMUS: CPT

## 2019-03-12 PROCEDURE — 63600175 PHARM REV CODE 636 W HCPCS: Performed by: NURSE PRACTITIONER

## 2019-03-12 PROCEDURE — 84100 ASSAY OF PHOSPHORUS: CPT

## 2019-03-12 PROCEDURE — 99232 SBSQ HOSP IP/OBS MODERATE 35: CPT | Mod: ,,, | Performed by: INTERNAL MEDICINE

## 2019-03-12 PROCEDURE — 25000003 PHARM REV CODE 250: Performed by: INTERNAL MEDICINE

## 2019-03-12 PROCEDURE — 83735 ASSAY OF MAGNESIUM: CPT

## 2019-03-12 PROCEDURE — 85025 COMPLETE CBC W/AUTO DIFF WBC: CPT

## 2019-03-12 PROCEDURE — 63600175 PHARM REV CODE 636 W HCPCS: Performed by: PHYSICIAN ASSISTANT

## 2019-03-12 PROCEDURE — 99232 PR SUBSEQUENT HOSPITAL CARE,LEVL II: ICD-10-PCS | Mod: ,,, | Performed by: INTERNAL MEDICINE

## 2019-03-12 PROCEDURE — 80053 COMPREHEN METABOLIC PANEL: CPT

## 2019-03-12 PROCEDURE — 25000003 PHARM REV CODE 250: Performed by: PHYSICIAN ASSISTANT

## 2019-03-12 PROCEDURE — 82330 ASSAY OF CALCIUM: CPT

## 2019-03-12 RX ADMIN — PRAVASTATIN SODIUM 20 MG: 20 TABLET ORAL at 08:03

## 2019-03-12 RX ADMIN — FOLIC ACID 1000 MCG: 1 TABLET ORAL at 08:03

## 2019-03-12 RX ADMIN — MAGNESIUM 64 MG (MAGNESIUM CHLORIDE) TABLET,DELAYED RELEASE 64 MG: at 09:03

## 2019-03-12 RX ADMIN — FAMOTIDINE 20 MG: 20 TABLET ORAL at 09:03

## 2019-03-12 RX ADMIN — INSULIN ASPART 7 UNITS: 100 INJECTION, SOLUTION INTRAVENOUS; SUBCUTANEOUS at 12:03

## 2019-03-12 RX ADMIN — ASPIRIN 81 MG: 81 TABLET, COATED ORAL at 08:03

## 2019-03-12 RX ADMIN — PREDNISONE 5 MG: 5 TABLET ORAL at 08:03

## 2019-03-12 RX ADMIN — FLUTICASONE PROPIONATE 50 MCG: 50 SPRAY, METERED NASAL at 08:03

## 2019-03-12 RX ADMIN — INSULIN ASPART 7 UNITS: 100 INJECTION, SOLUTION INTRAVENOUS; SUBCUTANEOUS at 05:03

## 2019-03-12 RX ADMIN — AMLODIPINE BESYLATE 5 MG: 5 TABLET ORAL at 08:03

## 2019-03-12 RX ADMIN — POTASSIUM CHLORIDE 20 MEQ: 1500 TABLET, EXTENDED RELEASE ORAL at 08:03

## 2019-03-12 RX ADMIN — FAMOTIDINE 20 MG: 20 TABLET ORAL at 08:03

## 2019-03-12 RX ADMIN — CEFTRIAXONE 2 G: 2 INJECTION, SOLUTION INTRAVENOUS at 04:03

## 2019-03-12 RX ADMIN — TACROLIMUS 0.5 MG: 0.5 CAPSULE ORAL at 08:03

## 2019-03-12 RX ADMIN — MAGNESIUM 64 MG (MAGNESIUM CHLORIDE) TABLET,DELAYED RELEASE 128 MG: at 09:03

## 2019-03-12 RX ADMIN — TACROLIMUS 0.5 MG: 0.5 CAPSULE ORAL at 05:03

## 2019-03-12 RX ADMIN — INSULIN ASPART 7 UNITS: 100 INJECTION, SOLUTION INTRAVENOUS; SUBCUTANEOUS at 08:03

## 2019-03-12 NOTE — CARE UPDATE
BG goal 140-180.      Continue Levemir  13 units q HS  Continue Novolog  7 units with meals   Low dose correction scale - given kidney function  BG monitoring AC/HS  Continue ADA 2800 byron diet      Discharge Recommendations:     Continue home insulin. However, decrease doses unit-for-unit to correlate with current hospital dosage.   180 - 230 + 1 unit  231- 280  + 2 units  281 - 330 + 3 units  331 - 380 + 4 units      > 380   + 5 units        Have patient send logs and follow-up with Endocrinology discharge clinic as scheduled.

## 2019-03-12 NOTE — SUBJECTIVE & OBJECTIVE
"Interval HPI:   Overnight events:     NAEON. BG remains within goal. Remains on Steroid therapy.     Eatin%  Nausea: No  Hypoglycemia and intervention: No  Fever: No  TPN and/or TF: No  If yes, type of TF/TPN and rate: None    BP (!) 147/83   Pulse 94   Temp 98.3 °F (36.8 °C) (Oral)   Resp (!) 23   Ht 5' 11" (1.803 m)   Wt 101 kg (222 lb 10.6 oz)   SpO2 99%   BMI 31.06 kg/m²     Labs Reviewed and Include    Recent Labs   Lab 19  0630      CALCIUM 10.1   ALBUMIN 3.1*   PROT 6.8      K 3.6   CO2 30*      BUN 27*   CREATININE 2.2*   ALKPHOS 65   ALT 24   AST 21   BILITOT 0.3     Lab Results   Component Value Date    WBC 10.65 2019    HGB 10.4 (L) 2019    HCT 32.9 (L) 2019    MCV 94 2019     (H) 2019     No results for input(s): TSH, FREET4 in the last 168 hours.  Lab Results   Component Value Date    HGBA1C 10.9 (H) 2019       Nutritional status:   Body mass index is 31.06 kg/m².  Lab Results   Component Value Date    ALBUMIN 3.1 (L) 2019    ALBUMIN 3.0 (L) 03/10/2019    ALBUMIN 3.1 (L) 2019     No results found for: PREALBUMIN    Estimated Creatinine Clearance: 45.4 mL/min (A) (based on SCr of 2.2 mg/dL (H)).    Accu-Checks  Recent Labs     19  0757 19  1121 19  1626 19  2051 03/10/19  0719 03/10/19  1142 03/10/19  1619 03/10/19  2032 19  1201 19  1701   POCTGLUCOSE 151* 166* 155* 154* 129* 165* 151* 113* 131* 136*       Current Medications and/or Treatments Impacting Glycemic Control  Immunotherapy:    Immunosuppressants         Stop Route Frequency     tacrolimus capsule 0.5 mg      -- Oral 2 times daily        Steroids:   Hormones (From admission, onward)    Start     Stop Route Frequency Ordered    19 0900  predniSONE tablet 5 mg      -- Oral Daily 19 3324        Pressors:    Autonomic Drugs (From admission, onward)    None        Hyperglycemia/Diabetes Medications: "   Antihyperglycemics (From admission, onward)    Start     Stop Route Frequency Ordered    03/07/19 2100  insulin detemir U-100 pen 13 Units      -- SubQ Nightly 03/07/19 1048    03/07/19 1645  insulin aspart U-100 pen 7 Units      -- SubQ 3 times daily with meals 03/07/19 1508    03/03/19 1745  insulin aspart U-100 pen 0-5 Units      -- SubQ Before meals & nightly PRN 03/03/19 1645

## 2019-03-12 NOTE — SUBJECTIVE & OBJECTIVE
Subjective:     Interval History: No acute events overnight. RUE edema stable. Remains with minimal BLE unchanged from admission. Eliquis remains on hold for tunneled catheter placement tomorrow. Will plan for discharge tomorrow afternoon following procedure if tolerated well by patient.     Continuous Infusions:  Scheduled Meds:   amLODIPine  5 mg Oral Daily    aspirin  81 mg Oral Daily    cefTRIAXone (ROCEPHIN) IVPB  2 g Intravenous Q24H    famotidine  20 mg Oral BID    fluticasone  1 spray Each Nare Daily    folic acid  1,000 mcg Oral Daily    insulin aspart U-100  7 Units Subcutaneous TIDWM    insulin detemir U-100  13 Units Subcutaneous QHS    magnesium chloride  128 mg Oral Daily    magnesium chloride  64 mg Oral QHS    potassium chloride SA  20 mEq Oral Daily    pravastatin  20 mg Oral Daily    predniSONE  5 mg Oral Daily    tacrolimus  0.5 mg Oral BID     PRN Meds:acetaminophen, calcium gluconate IVPB, calcium gluconate IVPB, calcium gluconate IVPB, dextrose 50%, dextrose 50%, glucagon (human recombinant), glucose, glucose, insulin aspart U-100, magnesium sulfate IVPB **AND** magnesium sulfate IVPB, ondansetron, potassium chloride 10% **AND** potassium chloride 10% **AND** potassium chloride 10%, promethazine (PHENERGAN) IVPB    Review of patient's allergies indicates:  No Known Allergies    Review of Systems   Constitutional: Negative for chills, diaphoresis and fever.   HENT: Negative for congestion, rhinorrhea, sinus pain, sore throat and voice change.    Eyes: Negative for discharge and redness.   Respiratory: Negative for cough, shortness of breath and wheezing.    Cardiovascular: Positive for leg swelling. Negative for chest pain.   Gastrointestinal: Negative for abdominal distention, abdominal pain, diarrhea, nausea and vomiting.   Endocrine: Negative for polydipsia and polyuria.   Genitourinary: Negative for dysuria, frequency and urgency.   Musculoskeletal: Negative for back pain,  myalgias, neck pain and neck stiffness.   Skin: Negative for color change and pallor.   Allergic/Immunologic: Positive for immunocompromised state.   Neurological: Negative for dizziness, light-headedness and headaches.   Hematological: Negative for adenopathy. Does not bruise/bleed easily.   Psychiatric/Behavioral: Negative for agitation, confusion and decreased concentration.     Objective:   Physical Exam   Constitutional: He is oriented to person, place, and time. He appears well-developed and well-nourished. No distress.   HENT:   Head: Normocephalic and atraumatic.   Right Ear: External ear normal.   Left Ear: External ear normal.   Nose: Nose normal.   Mouth/Throat: Oropharynx is clear and moist.   Eyes: Conjunctivae and EOM are normal. Right eye exhibits no discharge. Left eye exhibits no discharge.   Neck: Normal range of motion. Neck supple. No JVD present.   Cardiovascular: Normal rate, regular rhythm and normal heart sounds. Exam reveals no gallop and no friction rub.   No murmur heard.  Pulmonary/Chest: Effort normal. He has no wheezes. He has no rhonchi. He has no rales.   Well-healed mid-line sternotomy incision   Abdominal: Soft. Bowel sounds are normal. He exhibits no distension. There is no tenderness.   Musculoskeletal: Normal range of motion. He exhibits edema (RUE improving, no erythema/induration; trace edema bilateral lower extremities ).   Neurological: He is alert and oriented to person, place, and time.   Skin: Skin is warm and dry. He is not diaphoretic.   Psychiatric: He has a normal mood and affect. His behavior is normal.   Nursing note and vitals reviewed.        Vital Signs (Most Recent):  Temp: 98.2 °F (36.8 °C) (03/12/19 1031)  Pulse: 79 (03/12/19 1136)  Resp: 17 (03/12/19 1136)  BP: (!) 151/88 (03/12/19 1136)  SpO2: 99 % (03/12/19 1136) Vital Signs (24h Range):  Temp:  [98 °F (36.7 °C)-98.4 °F (36.9 °C)] 98.2 °F (36.8 °C)  Pulse:  [] 79  Resp:  [15-23] 17  SpO2:  [96 %-99 %]  99 %  BP: (131-151)/(74-88) 151/88     Weight: 100.4 kg (221 lb 5.5 oz)  Body mass index is 30.87 kg/m².      Intake/Output Summary (Last 24 hours) at 3/12/2019 1408  Last data filed at 3/12/2019 0918  Gross per 24 hour   Intake 2230 ml   Output 2850 ml   Net -620 ml       Significant Labs:  CBC:  Recent Labs   Lab 03/12/19  0642   WBC 8.78   RBC 3.00*   HGB 9.0*   HCT 27.8*   *   MCV 93   MCH 30.0   MCHC 32.4     BMP:  Recent Labs   Lab 03/12/19  0642      K 4.1      CO2 29   BUN 25*   CREATININE 2.1*   CALCIUM 9.6      Tacrolimus Levels:  Recent Labs   Lab 03/12/19  0642   TACROLIMUS <1.5*     Microbiology:  Microbiology Results (last 7 days)     Procedure Component Value Units Date/Time    Blood culture #2 **CANNOT BE ORDERED STAT** [[id number]] Collected:  03/03/19 1443    Order Status:  Completed Specimen:  Blood from Peripheral, Wrist, Left Updated:  03/08/19 1612     Blood Culture, Routine No growth after 5 days.    Blood culture #1 **CANNOT BE ORDERED STAT** [[id number]] Collected:  03/03/19 1352    Order Status:  Completed Specimen:  Blood from Peripheral, Antecubital, Left Updated:  03/08/19 1612     Blood Culture, Routine No growth after 5 days.    IV catheter culture [[id number]] Collected:  03/03/19 1502    Order Status:  Completed Specimen:  Catheter Tip, PICC Updated:  03/06/19 0933     Aerobic Culture - Cath tip No growth          I have reviewed all pertinent labs within the past 24 hours.

## 2019-03-12 NOTE — PLAN OF CARE
Problem: Adult Inpatient Plan of Care  Goal: Plan of Care Review  Outcome: Ongoing (interventions implemented as appropriate)  Pt AAOx4, VSS, afebrile.  Currently denies c/o pain.  Pt verbalized frustration with diet orders in hospital.  Diet orders changed to 2800 ADA diet per endocrine.  Blood sugar 187 at bedtime.  Fall risk precautions initiated.  Pt in lowest bed position setting, lighting adjusted, pt to wear nonskid socks when ambulating, side rails up x2.  Pt remain free from falls during shift.  Pt verbalize understanding to call when needed assistance. Call light within reach.  Will continue to monitor.

## 2019-03-12 NOTE — PLAN OF CARE
Pt aaox3.  Bed in low and locked position.  Nonskid footwear in use.  Independently ambulatory around room and halls throughout day.  Denies pain.  Call bell, phone, food, drink, urinal within reach in bed or on bedside table.  Aware to call if needing assistance.  Accuchecks ac/hs with mealtime insulin given.  Right arm with limb alert, still edematous and slightly red, but per pt improved.  New PIV inserted again today by picc team after today after previous one infiltrated and 3 attempts per Charge rn.   bautista on hold for line placement later this week.  NPO past midnight in case able to do analilia.  See flowsheet for full assessment and details

## 2019-03-12 NOTE — PROGRESS NOTES
[facility]  Lung Transplant  Progress Note - Floor    Patient Name: Martin Hendrix Jr.  MRN: [id number]  Admission Date: 3/3/2019  Hospital Length of Stay: 9 days  Post-Operative Day: 567  Attending Physician: Darrick Wolfe MD  Primary Care Provider: Sukhi Dunham Jr, MD     Subjective:     Interval History: No acute events overnight. RUE edema stable. Remains with minimal BLE unchanged from admission. Eliquis remains on hold for tunneled catheter placement tomorrow. Will plan for discharge tomorrow afternoon following procedure if tolerated well by patient.     Continuous Infusions:  Scheduled Meds:   amLODIPine  5 mg Oral Daily    aspirin  81 mg Oral Daily    cefTRIAXone (ROCEPHIN) IVPB  2 g Intravenous Q24H    famotidine  20 mg Oral BID    fluticasone  1 spray Each Nare Daily    folic acid  1,000 mcg Oral Daily    insulin aspart U-100  7 Units Subcutaneous TIDWM    insulin detemir U-100  13 Units Subcutaneous QHS    magnesium chloride  128 mg Oral Daily    magnesium chloride  64 mg Oral QHS    potassium chloride SA  20 mEq Oral Daily    pravastatin  20 mg Oral Daily    predniSONE  5 mg Oral Daily    tacrolimus  0.5 mg Oral BID     PRN Meds:acetaminophen, calcium gluconate IVPB, calcium gluconate IVPB, calcium gluconate IVPB, dextrose 50%, dextrose 50%, glucagon (human recombinant), glucose, glucose, insulin aspart U-100, magnesium sulfate IVPB **AND** magnesium sulfate IVPB, ondansetron, potassium chloride 10% **AND** potassium chloride 10% **AND** potassium chloride 10%, promethazine (PHENERGAN) IVPB    Review of patient's allergies indicates:  No Known Allergies    Review of Systems   Constitutional: Negative for chills, diaphoresis and fever.   HENT: Negative for congestion, rhinorrhea, sinus pain, sore throat and voice change.    Eyes: Negative for discharge and redness.   Respiratory: Negative for cough, shortness of breath and wheezing.    Cardiovascular: Positive for  leg swelling. Negative for chest pain.   Gastrointestinal: Negative for abdominal distention, abdominal pain, diarrhea, nausea and vomiting.   Endocrine: Negative for polydipsia and polyuria.   Genitourinary: Negative for dysuria, frequency and urgency.   Musculoskeletal: Negative for back pain, myalgias, neck pain and neck stiffness.   Skin: Negative for color change and pallor.   Allergic/Immunologic: Positive for immunocompromised state.   Neurological: Negative for dizziness, light-headedness and headaches.   Hematological: Negative for adenopathy. Does not bruise/bleed easily.   Psychiatric/Behavioral: Negative for agitation, confusion and decreased concentration.     Objective:   Physical Exam   Constitutional: He is oriented to person, place, and time. He appears well-developed and well-nourished. No distress.   HENT:   Head: Normocephalic and atraumatic.   Right Ear: External ear normal.   Left Ear: External ear normal.   Nose: Nose normal.   Mouth/Throat: Oropharynx is clear and moist.   Eyes: Conjunctivae and EOM are normal. Right eye exhibits no discharge. Left eye exhibits no discharge.   Neck: Normal range of motion. Neck supple. No JVD present.   Cardiovascular: Normal rate, regular rhythm and normal heart sounds. Exam reveals no gallop and no friction rub.   No murmur heard.  Pulmonary/Chest: Effort normal. He has no wheezes. He has no rhonchi. He has no rales.   Well-healed mid-line sternotomy incision   Abdominal: Soft. Bowel sounds are normal. He exhibits no distension. There is no tenderness.   Musculoskeletal: Normal range of motion. He exhibits edema (RUE improving, no erythema/induration; trace edema bilateral lower extremities ).   Neurological: He is alert and oriented to person, place, and time.   Skin: Skin is warm and dry. He is not diaphoretic.   Psychiatric: He has a normal mood and affect. His behavior is normal.   Nursing note and vitals reviewed.        Vital Signs (Most  Recent):  Temp: 98.2 °F (36.8 °C) (03/12/19 1031)  Pulse: 79 (03/12/19 1136)  Resp: 17 (03/12/19 1136)  BP: (!) 151/88 (03/12/19 1136)  SpO2: 99 % (03/12/19 1136) Vital Signs (24h Range):  Temp:  [98 °F (36.7 °C)-98.4 °F (36.9 °C)] 98.2 °F (36.8 °C)  Pulse:  [] 79  Resp:  [15-23] 17  SpO2:  [96 %-99 %] 99 %  BP: (131-151)/(74-88) 151/88     Weight: 100.4 kg (221 lb 5.5 oz)  Body mass index is 30.87 kg/m².      Intake/Output Summary (Last 24 hours) at 3/12/2019 1408  Last data filed at 3/12/2019 0918  Gross per 24 hour   Intake 2230 ml   Output 2850 ml   Net -620 ml       Significant Labs:  CBC:  Recent Labs   Lab 03/12/19  0642   WBC 8.78   RBC 3.00*   HGB 9.0*   HCT 27.8*   *   MCV 93   MCH 30.0   MCHC 32.4     BMP:  Recent Labs   Lab 03/12/19  0642      K 4.1      CO2 29   BUN 25*   CREATININE 2.1*   CALCIUM 9.6      Tacrolimus Levels:  Recent Labs   Lab 03/12/19  0642   TACROLIMUS <1.5*     Microbiology:  Microbiology Results (last 7 days)     Procedure Component Value Units Date/Time    Blood culture #2 **CANNOT BE ORDERED STAT** [[id number]] Collected:  03/03/19 1443    Order Status:  Completed Specimen:  Blood from Peripheral, Wrist, Left Updated:  03/08/19 1612     Blood Culture, Routine No growth after 5 days.    Blood culture #1 **CANNOT BE ORDERED STAT** [[id number]] Collected:  03/03/19 1352    Order Status:  Completed Specimen:  Blood from Peripheral, Antecubital, Left Updated:  03/08/19 1612     Blood Culture, Routine No growth after 5 days.    IV catheter culture [[id number]] Collected:  03/03/19 1502    Order Status:  Completed Specimen:  Catheter Tip, PICC Updated:  03/06/19 0933     Aerobic Culture - Cath tip No growth          I have reviewed all pertinent labs within the past 24 hours.        Assessment/Plan:     * Cellulitis of right upper extremity    Initially had a WBC of 21, which was concerning for possible bacteremia versus cellulitis from PICC line. PICC removed while  in ER. Tip sent for culture with NGTD. Blood cultures x2 with NGTD. Procalcitonin significantly elevated at 11.32 at admit.  Was on IV vancomycin and cefepime, but Cefepime changed to ceftriaxone per ID recs on 3/8. Vanc d/c'd 3/11.     Continue ceftriaxone x 14 days. IV infusion orders placed.      Acute kidney injury    Creatinine 2.1 and continues to slowly downtrend. Continue to trend inpatient and avoid nephrotoxic medications. Will resume Foscarnet infusions outpatient.      Lung replaced by transplant    Patient is s/p BLT 18 months ago secondary to sarcoidosis with associated pulmonary hypertension. 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 Last Foscarnet infusion on 3/2 PM dose.    Continue immunosuppression and ppx.      Immunosuppression    Tacrolimus held for AVILA and resumed at 0.5 mg BID on 3/10 PM.  Will monitor daily tacrolimus levels daily while inpatient. 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 Continue prednisone 5 mg daily.      Prophylactic antibiotic    Continue to hold Bactrim in setting of AVILA. Will likely resume as Bactrim SS as outpatient.      Cytomegalovirus (CMV) viremia    Started on Foscarnet IV infusions BID on 2/13/2019 and has been closely followed by ID, Dr. Lowry.  Will continue to hold Foscarnet infusions for now while awaiting resolution of AVILA and electrolyte imbalances. Most recent CMV PCR on 3/3 was 63. ID following, appreciate recs. Will plan to resume Foscarnet infusions as outpatient.      Hypomagnesemia    Secondary to Foscarnet infusions. Continue to monitor and replace as needed.     Type 2 diabetes mellitus with hyperglycemia, with long-term current use of insulin    Endocrine consulted, appreciate recs.     Acute deep vein thrombosis (DVT)     Apixaban started 3/5 for RUE and LLE DVT. Will continue to hold for tunneled cath placement tomorrow at 1300. Will resume apixaban as outpatient.          Patricia Fowler PA-C  Lung Transplant  [facility]

## 2019-03-12 NOTE — PROGRESS NOTES
"[facility]  Endocrinology  Progress Note    Admit Date: 3/3/2019     Reason for Consult: Management of type 2 DM, Hyperglycemia     Surgical Procedure and Date: 2017 - Bilateral Lung Transplant     Diabetes diagnosis year:      Home Diabetes Medications:   Tresiba 20 units HS and humalog 12 units with meals  Lab Results   Component Value Date    HGBA1C 10.9 (H) 2019        How often checking glucose at home? One   BG readings on regimen: 100s  Hypoglycemia on the regimen?  no  Missed doses on regimen?  Yes about once a day - humalog missed      Diabetes Complications include:     Hyperglycemia     Complicating diabetes co morbidities:   KRISTYN and Glucocorticoid use , A-fib, AVILA, Pancreatitis, Osteopenia        HPI:   Patient is a 58 y.o. male with a diagnosis of type 2 DM on MDI. Also with sarcoidosis (s/p BLT), KRISTYN, osteopenia, and Afib. Patient presented to ED with right upper arm swelling were PICC is located; also with nausea, vomiting, and diarrhea. Endocrinology consulted for BG/ DM management.             Interval HPI:   Overnight events:     NAEON. BG remains within goal. Remains on Steroid therapy.     Eatin%  Nausea: No  Hypoglycemia and intervention: No  Fever: No  TPN and/or TF: No  If yes, type of TF/TPN and rate: None    BP (!) 147/83   Pulse 94   Temp 98.3 °F (36.8 °C) (Oral)   Resp (!) 23   Ht 5' 11" (1.803 m)   Wt 101 kg (222 lb 10.6 oz)   SpO2 99%   BMI 31.06 kg/m²      Labs Reviewed and Include    Recent Labs   Lab 19  0630      CALCIUM 10.1   ALBUMIN 3.1*   PROT 6.8      K 3.6   CO2 30*      BUN 27*   CREATININE 2.2*   ALKPHOS 65   ALT 24   AST 21   BILITOT 0.3     Lab Results   Component Value Date    WBC 10.65 2019    HGB 10.4 (L) 2019    HCT 32.9 (L) 2019    MCV 94 2019     (H) 2019     No results for input(s): TSH, FREET4 in the last 168 hours.  Lab Results   Component Value Date    " HGBA1C 10.9 (H) 02/12/2019       Nutritional status:   Body mass index is 31.06 kg/m².  Lab Results   Component Value Date    ALBUMIN 3.1 (L) 03/11/2019    ALBUMIN 3.0 (L) 03/10/2019    ALBUMIN 3.1 (L) 03/09/2019     No results found for: PREALBUMIN    Estimated Creatinine Clearance: 45.4 mL/min (A) (based on SCr of 2.2 mg/dL (H)).    Accu-Checks  Recent Labs     03/09/19  0757 03/09/19  1121 03/09/19  1626 03/09/19  2051 03/10/19  0719 03/10/19  1142 03/10/19  1619 03/10/19  2032 03/11/19  1201 03/11/19  1701   POCTGLUCOSE 151* 166* 155* 154* 129* 165* 151* 113* 131* 136*       Current Medications and/or Treatments Impacting Glycemic Control  Immunotherapy:    Immunosuppressants         Stop Route Frequency     tacrolimus capsule 0.5 mg      -- Oral 2 times daily        Steroids:   Hormones (From admission, onward)    Start     Stop Route Frequency Ordered    03/04/19 0900  predniSONE tablet 5 mg      -- Oral Daily 03/03/19 1517        Pressors:    Autonomic Drugs (From admission, onward)    None        Hyperglycemia/Diabetes Medications:   Antihyperglycemics (From admission, onward)    Start     Stop Route Frequency Ordered    03/07/19 2100  insulin detemir U-100 pen 13 Units      -- SubQ Nightly 03/07/19 1048    03/07/19 1645  insulin aspart U-100 pen 7 Units      -- SubQ 3 times daily with meals 03/07/19 1508    03/03/19 1745  insulin aspart U-100 pen 0-5 Units      -- SubQ Before meals & nightly PRN 03/03/19 1645          ASSESSMENT and PLAN    * Cellulitis of right upper extremity    Infection may increase insulin resistance.        Type 2 diabetes mellitus with hyperglycemia, with long-term current use of insulin    BG goal 140-180.     Continue Levemir  13 units q HS  Continue Novolog  7 units with meals   Low dose correction scale - given kidney function  BG monitoring AC/HS  Patient complains of being hungry all the time, but express want to eat appropriatley. Diet increases to 2800 byron., as this will be  more similar to patients home diet. Patient still agrees to follow-up with DM education to learn more about ADA diet.     Discharge planning:   Continue home insulin. However, decrease doses unit-for-unit to correlate with current hospital dosage.   180 - 230 + 1 unit  231- 280  + 2 units  281 - 330 + 3 units  331 - 380 + 4 units      > 380   + 5 units      Have patient send logs and follow-up with Endocrinology discharge clinic as scheduled.       Cytomegalovirus (CMV) viremia    Managed per primary team  Avoid hypoglycemia         Immunosuppression    May increase insulin resistance.     Class 1 obesity due to excess calories with serious comorbidity and body mass index (BMI) of 30.0 to 30.9 in adult    may contribute to insulin resistance  Body mass index is 31.06 kg/m².             Delroy Vergara, NP  Endocrinology  [facility]

## 2019-03-12 NOTE — ASSESSMENT & PLAN NOTE
BG goal 140-180.     Continue Levemir  13 units q HS  Continue Novolog  7 units with meals   Low dose correction scale - given kidney function  BG monitoring AC/HS  Patient complains of being hungry all the time, but express want to eat appropriatley. Diet increases to 2800 byron., as this will be more similar to patients home diet. Patient still agrees to follow-up with DM education to learn more about ADA diet.     Discharge planning:   Continue home insulin. However, decrease doses unit-for-unit to correlate with current hospital dosage.   180 - 230 + 1 unit  231- 280  + 2 units  281 - 330 + 3 units  331 - 380 + 4 units      > 380   + 5 units      Have patient send logs and follow-up with Endocrinology discharge clinic as scheduled.

## 2019-03-13 DIAGNOSIS — E83.42 HYPOMAGNESEMIA: ICD-10-CM

## 2019-03-13 DIAGNOSIS — Z94.2 LUNG TRANSPLANTED: Primary | ICD-10-CM

## 2019-03-13 LAB
ALBUMIN SERPL BCP-MCNC: 2.9 G/DL
ALP SERPL-CCNC: 63 U/L
ALT SERPL W/O P-5'-P-CCNC: 24 U/L
ANION GAP SERPL CALC-SCNC: 10 MMOL/L
AST SERPL-CCNC: 21 U/L
BASOPHILS # BLD AUTO: 0.13 K/UL
BASOPHILS NFR BLD: 1.5 %
BILIRUB SERPL-MCNC: 0.2 MG/DL
BUN SERPL-MCNC: 25 MG/DL
CA-I BLDV-SCNC: 1.08 MMOL/L
CALCIUM SERPL-MCNC: 9.7 MG/DL
CHLORIDE SERPL-SCNC: 104 MMOL/L
CO2 SERPL-SCNC: 29 MMOL/L
CREAT SERPL-MCNC: 2 MG/DL
DIFFERENTIAL METHOD: ABNORMAL
EOSINOPHIL # BLD AUTO: 0.2 K/UL
EOSINOPHIL NFR BLD: 2.5 %
ERYTHROCYTE [DISTWIDTH] IN BLOOD BY AUTOMATED COUNT: 15.2 %
EST. GFR  (AFRICAN AMERICAN): 41.9 ML/MIN/1.73 M^2
EST. GFR  (NON AFRICAN AMERICAN): 36.2 ML/MIN/1.73 M^2
GLUCOSE SERPL-MCNC: 102 MG/DL
HCT VFR BLD AUTO: 28.2 %
HGB BLD-MCNC: 9 G/DL
IMM GRANULOCYTES # BLD AUTO: 0.23 K/UL
IMM GRANULOCYTES NFR BLD AUTO: 2.6 %
LYMPHOCYTES # BLD AUTO: 0.9 K/UL
LYMPHOCYTES NFR BLD: 9.5 %
MAGNESIUM SERPL-MCNC: 1.3 MG/DL
MCH RBC QN AUTO: 30.3 PG
MCHC RBC AUTO-ENTMCNC: 31.9 G/DL
MCV RBC AUTO: 95 FL
MONOCYTES # BLD AUTO: 1.1 K/UL
MONOCYTES NFR BLD: 12.4 %
NEUTROPHILS # BLD AUTO: 6.4 K/UL
NEUTROPHILS NFR BLD: 71.5 %
NRBC BLD-RTO: 0 /100 WBC
PHOSPHATE SERPL-MCNC: 4.7 MG/DL
PLATELET # BLD AUTO: 377 K/UL
PMV BLD AUTO: 8.5 FL
POCT GLUCOSE: 123 MG/DL (ref 70–110)
POCT GLUCOSE: 134 MG/DL (ref 70–110)
POCT GLUCOSE: 142 MG/DL (ref 70–110)
POTASSIUM SERPL-SCNC: 4 MMOL/L
PROT SERPL-MCNC: 6.5 G/DL
RBC # BLD AUTO: 2.97 M/UL
SODIUM SERPL-SCNC: 143 MMOL/L
TACROLIMUS BLD-MCNC: 1.5 NG/ML
WBC # BLD AUTO: 8.92 K/UL

## 2019-03-13 PROCEDURE — 99232 PR SUBSEQUENT HOSPITAL CARE,LEVL II: ICD-10-PCS | Mod: ,,, | Performed by: INTERNAL MEDICINE

## 2019-03-13 PROCEDURE — 77001 CHG FLUOROGUIDE CNTRL VEN ACCESS,PLACE,REPLACE,REMOVE: ICD-10-PCS | Mod: 26,,, | Performed by: INTERNAL MEDICINE

## 2019-03-13 PROCEDURE — C1894 INTRO/SHEATH, NON-LASER: HCPCS | Performed by: INTERNAL MEDICINE

## 2019-03-13 PROCEDURE — C1751 CATH, INF, PER/CENT/MIDLINE: HCPCS | Performed by: INTERNAL MEDICINE

## 2019-03-13 PROCEDURE — 25000003 PHARM REV CODE 250: Performed by: PHYSICIAN ASSISTANT

## 2019-03-13 PROCEDURE — 82330 ASSAY OF CALCIUM: CPT

## 2019-03-13 PROCEDURE — 63600175 PHARM REV CODE 636 W HCPCS: Performed by: INTERNAL MEDICINE

## 2019-03-13 PROCEDURE — 36558 INSERT TUNNELED CV CATH: CPT | Mod: ,,, | Performed by: INTERNAL MEDICINE

## 2019-03-13 PROCEDURE — 63600175 PHARM REV CODE 636 W HCPCS: Performed by: PHYSICIAN ASSISTANT

## 2019-03-13 PROCEDURE — 85025 COMPLETE CBC W/AUTO DIFF WBC: CPT

## 2019-03-13 PROCEDURE — 99152 MOD SED SAME PHYS/QHP 5/>YRS: CPT | Performed by: INTERNAL MEDICINE

## 2019-03-13 PROCEDURE — 80053 COMPREHEN METABOLIC PANEL: CPT

## 2019-03-13 PROCEDURE — 99152 PR MOD CONSCIOUS SEDATION, SAME PHYS, 5+ YRS, FIRST 15 MIN: ICD-10-PCS | Mod: ,,, | Performed by: INTERNAL MEDICINE

## 2019-03-13 PROCEDURE — 63600175 PHARM REV CODE 636 W HCPCS: Performed by: NURSE PRACTITIONER

## 2019-03-13 PROCEDURE — 83735 ASSAY OF MAGNESIUM: CPT

## 2019-03-13 PROCEDURE — 25000003 PHARM REV CODE 250: Performed by: INTERNAL MEDICINE

## 2019-03-13 PROCEDURE — 77001 FLUOROGUIDE FOR VEIN DEVICE: CPT | Performed by: INTERNAL MEDICINE

## 2019-03-13 PROCEDURE — 36558 INSERT TUNNELED CV CATH: CPT | Performed by: INTERNAL MEDICINE

## 2019-03-13 PROCEDURE — 99152 MOD SED SAME PHYS/QHP 5/>YRS: CPT | Mod: ,,, | Performed by: INTERNAL MEDICINE

## 2019-03-13 PROCEDURE — 36415 COLL VENOUS BLD VENIPUNCTURE: CPT

## 2019-03-13 PROCEDURE — 76937 US GUIDE VASCULAR ACCESS: CPT | Performed by: INTERNAL MEDICINE

## 2019-03-13 PROCEDURE — 20600001 HC STEP DOWN PRIVATE ROOM

## 2019-03-13 PROCEDURE — 84100 ASSAY OF PHOSPHORUS: CPT

## 2019-03-13 PROCEDURE — 77001 FLUOROGUIDE FOR VEIN DEVICE: CPT | Mod: 26,,, | Performed by: INTERNAL MEDICINE

## 2019-03-13 PROCEDURE — 76937 PR  US GUIDE, VASCULAR ACCESS: ICD-10-PCS | Mod: 26,,, | Performed by: INTERNAL MEDICINE

## 2019-03-13 PROCEDURE — 80197 ASSAY OF TACROLIMUS: CPT

## 2019-03-13 PROCEDURE — 36558 PR INSERT TUNNELED CV CATH W/O PORT OR PUMP: ICD-10-PCS | Mod: ,,, | Performed by: INTERNAL MEDICINE

## 2019-03-13 PROCEDURE — 99232 SBSQ HOSP IP/OBS MODERATE 35: CPT | Mod: ,,, | Performed by: INTERNAL MEDICINE

## 2019-03-13 PROCEDURE — 76937 US GUIDE VASCULAR ACCESS: CPT | Mod: 26,,, | Performed by: INTERNAL MEDICINE

## 2019-03-13 DEVICE — POWERHICKMAN 9.5F DUAL-LUMEN POLYURETHANE CATHETER WITH SURECUFF INGROWTH CUFF (5CM) INTERMEDIATE KIT
Type: IMPLANTABLE DEVICE | Site: NECK | Status: FUNCTIONAL
Brand: POWERHICKMAN

## 2019-03-13 RX ORDER — TACROLIMUS 1 MG/1
1 CAPSULE ORAL EVERY 12 HOURS
Start: 2019-03-13 | End: 2019-03-21 | Stop reason: DRUGHIGH

## 2019-03-13 RX ORDER — SULFAMETHOXAZOLE AND TRIMETHOPRIM 400; 80 MG/1; MG/1
1 TABLET ORAL
Qty: 15 TABLET | Refills: 11 | Status: SHIPPED | OUTPATIENT
Start: 2019-03-13 | End: 2020-03-16

## 2019-03-13 RX ORDER — FENTANYL CITRATE 50 UG/ML
INJECTION, SOLUTION INTRAMUSCULAR; INTRAVENOUS
Status: DISCONTINUED | OUTPATIENT
Start: 2019-03-13 | End: 2019-03-13 | Stop reason: HOSPADM

## 2019-03-13 RX ORDER — LANOLIN ALCOHOL/MO/W.PET/CERES
400 CREAM (GRAM) TOPICAL 2 TIMES DAILY PRN
Qty: 180 TABLET | Refills: 11 | Status: ON HOLD
Start: 2019-03-13 | End: 2019-04-25 | Stop reason: SDUPTHER

## 2019-03-13 RX ORDER — INSULIN DEGLUDEC 200 U/ML
13 INJECTION, SOLUTION SUBCUTANEOUS NIGHTLY
Qty: 15 ML | Refills: 11
Start: 2019-03-13 | End: 2019-09-10 | Stop reason: SDUPTHER

## 2019-03-13 RX ORDER — MIDAZOLAM HYDROCHLORIDE 1 MG/ML
INJECTION, SOLUTION INTRAMUSCULAR; INTRAVENOUS
Status: DISCONTINUED | OUTPATIENT
Start: 2019-03-13 | End: 2019-03-13 | Stop reason: HOSPADM

## 2019-03-13 RX ORDER — EVEROLIMUS 0.75 MG/1
1.5 TABLET ORAL 2 TIMES DAILY
Status: DISCONTINUED | OUTPATIENT
Start: 2019-03-13 | End: 2019-03-14 | Stop reason: HOSPADM

## 2019-03-13 RX ORDER — TACROLIMUS 1 MG/1
1 CAPSULE ORAL 2 TIMES DAILY
Status: DISCONTINUED | OUTPATIENT
Start: 2019-03-14 | End: 2019-03-14 | Stop reason: HOSPADM

## 2019-03-13 RX ORDER — EVEROLIMUS 0.75 MG/1
1.5 TABLET ORAL 2 TIMES DAILY
Qty: 120 TABLET | Refills: 11 | Status: SHIPPED | OUTPATIENT
Start: 2019-03-13 | End: 2019-03-21

## 2019-03-13 RX ORDER — INSULIN LISPRO 100 [IU]/ML
7 INJECTION, SOLUTION INTRAVENOUS; SUBCUTANEOUS
Qty: 15 ML | Refills: 11
Start: 2019-03-13 | End: 2019-09-10 | Stop reason: SDUPTHER

## 2019-03-13 RX ORDER — TACROLIMUS 1 MG/1
1 CAPSULE ORAL ONCE
Status: COMPLETED | OUTPATIENT
Start: 2019-03-13 | End: 2019-03-13

## 2019-03-13 RX ADMIN — PRAVASTATIN SODIUM 20 MG: 20 TABLET ORAL at 03:03

## 2019-03-13 RX ADMIN — POTASSIUM CHLORIDE 20 MEQ: 1500 TABLET, EXTENDED RELEASE ORAL at 03:03

## 2019-03-13 RX ADMIN — FAMOTIDINE 20 MG: 20 TABLET ORAL at 07:03

## 2019-03-13 RX ADMIN — MAGNESIUM 64 MG (MAGNESIUM CHLORIDE) TABLET,DELAYED RELEASE 128 MG: at 03:03

## 2019-03-13 RX ADMIN — PREDNISONE 5 MG: 5 TABLET ORAL at 07:03

## 2019-03-13 RX ADMIN — TACROLIMUS 0.5 MG: 0.5 CAPSULE ORAL at 07:03

## 2019-03-13 RX ADMIN — TACROLIMUS 1 MG: 1 CAPSULE ORAL at 11:03

## 2019-03-13 RX ADMIN — TACROLIMUS 1.5 MG: 1 CAPSULE ORAL at 05:03

## 2019-03-13 RX ADMIN — INSULIN ASPART 7 UNITS: 100 INJECTION, SOLUTION INTRAVENOUS; SUBCUTANEOUS at 05:03

## 2019-03-13 RX ADMIN — EVEROLIMUS 1.5 MG: 0.75 TABLET ORAL at 08:03

## 2019-03-13 RX ADMIN — AMLODIPINE BESYLATE 5 MG: 5 TABLET ORAL at 03:03

## 2019-03-13 RX ADMIN — FOLIC ACID 1000 MCG: 1 TABLET ORAL at 03:03

## 2019-03-13 RX ADMIN — MAGNESIUM 64 MG (MAGNESIUM CHLORIDE) TABLET,DELAYED RELEASE 64 MG: at 07:03

## 2019-03-13 RX ADMIN — FAMOTIDINE 20 MG: 20 TABLET ORAL at 03:03

## 2019-03-13 RX ADMIN — CEFTRIAXONE 2 G: 2 INJECTION, SOLUTION INTRAVENOUS at 05:03

## 2019-03-13 RX ADMIN — FLUTICASONE PROPIONATE 50 MCG: 50 SPRAY, METERED NASAL at 03:03

## 2019-03-13 RX ADMIN — ASPIRIN 81 MG: 81 TABLET, COATED ORAL at 03:03

## 2019-03-13 NOTE — PLAN OF CARE
Pt aaox3.  Bed in low and locked position.  Nonskid footwear in use.  Independently ambulatory around room and halls throughout day.  Denies pain.  Call bell, phone, food, drink, urinal within reach in bed or on bedside table.  Aware to call if needing assistance.  Accuchecks ac/hs with mealtime insulin given.  Right arm with limb alert, still edematous and slightly red, but per pt improved.   NPO this am for R tunnel cath insertion at 1pm.  Upon return diet resumed.  All overdue meds given late at this time.  Plan for dc analilia am due to setting up outpt antibiotics and not wanting pt to drive self home post anesthesia.   eloquis to restart analilia.  See flowsheet for full assessment and details

## 2019-03-13 NOTE — PROGRESS NOTES
[facility]  Lung Transplant  Progress Note - Floor    Patient Name: Martin Hendrix Jr.  MRN: [id number]  Admission Date: 3/3/2019  Hospital Length of Stay: 10 days  Post-Operative Day: 568  Attending Physician: Darrick Wolfe MD  Primary Care Provider: Sukhi Dunham Jr, MD     Subjective:     Interval History: No acute events overnight. RUE edema stable. Tolerated placement of right tunneled cath well without complications this afternoon. Patient did receive conscious sedation, so will defer discharge home today as patient will have to drive himself home. Diet resumed. Eliquis to be resumed tomorrow AM. Tacrolimus dosing increased as AVILA has improved. Patient updated on plan of care for discharge tomorrow morning.     Continuous Infusions:  Scheduled Meds:   amLODIPine  5 mg Oral Daily    aspirin  81 mg Oral Daily    cefTRIAXone (ROCEPHIN) IVPB  2 g Intravenous Q24H    famotidine  20 mg Oral BID    fluticasone  1 spray Each Nare Daily    folic acid  1,000 mcg Oral Daily    insulin aspart U-100  7 Units Subcutaneous TIDWM    insulin detemir U-100  13 Units Subcutaneous QHS    magnesium chloride  128 mg Oral Daily    magnesium chloride  64 mg Oral QHS    potassium chloride SA  20 mEq Oral Daily    pravastatin  20 mg Oral Daily    predniSONE  5 mg Oral Daily    tacrolimus  1.5 mg Oral BID     PRN Meds:acetaminophen, calcium gluconate IVPB, calcium gluconate IVPB, calcium gluconate IVPB, dextrose 50%, dextrose 50%, fentaNYL, glucagon (human recombinant), glucose, glucose, insulin aspart U-100, magnesium sulfate IVPB **AND** magnesium sulfate IVPB, midazolam, ondansetron, potassium chloride 10% **AND** potassium chloride 10% **AND** potassium chloride 10%, promethazine (PHENERGAN) IVPB    Review of patient's allergies indicates:  No Known Allergies    Review of Systems   Constitutional: Negative for chills, diaphoresis and fever.   HENT: Negative for congestion, rhinorrhea, sinus  pain, sore throat and voice change.    Eyes: Negative for discharge and redness.   Respiratory: Negative for cough, shortness of breath and wheezing.    Cardiovascular: Positive for leg swelling. Negative for chest pain.   Gastrointestinal: Negative for abdominal distention, abdominal pain, diarrhea, nausea and vomiting.   Endocrine: Negative for polydipsia and polyuria.   Genitourinary: Negative for dysuria, frequency and urgency.   Musculoskeletal: Negative for back pain, myalgias, neck pain and neck stiffness.   Skin: Negative for color change and pallor.   Allergic/Immunologic: Positive for immunocompromised state.   Neurological: Negative for dizziness, light-headedness and headaches.   Hematological: Negative for adenopathy. Does not bruise/bleed easily.   Psychiatric/Behavioral: Negative for agitation, confusion and decreased concentration.     Objective:   Physical Exam   Constitutional: He is oriented to person, place, and time. He appears well-developed and well-nourished. No distress.   HENT:   Head: Normocephalic and atraumatic.   Right Ear: External ear normal.   Left Ear: External ear normal.   Nose: Nose normal.   Mouth/Throat: Oropharynx is clear and moist.   Eyes: Conjunctivae and EOM are normal. Right eye exhibits no discharge. Left eye exhibits no discharge.   Neck: Normal range of motion. Neck supple. No JVD present.   Cardiovascular: Normal rate, regular rhythm and normal heart sounds. Exam reveals no gallop and no friction rub.   No murmur heard.  Pulmonary/Chest: Effort normal. He has no wheezes. He has no rhonchi. He has no rales.   Well-healed mid-line sternotomy incision   Abdominal: Soft. Bowel sounds are normal. He exhibits no distension. There is no tenderness.   Musculoskeletal: Normal range of motion. He exhibits edema (RUE improving, no erythema/induration; trace edema bilateral lower extremities ).   Neurological: He is alert and oriented to person, place, and time.   Skin: Skin is  warm and dry. He is not diaphoretic.   Psychiatric: He has a normal mood and affect. His behavior is normal.   Nursing note and vitals reviewed.        Vital Signs (Most Recent):  Temp: 97.9 °F (36.6 °C) (03/13/19 1255)  Pulse: 88 (03/13/19 1255)  Resp: 17 (03/13/19 1255)  BP: (!) 140/78 (03/13/19 1255)  SpO2: 100 % (03/13/19 1255) Vital Signs (24h Range):  Temp:  [97.3 °F (36.3 °C)-97.9 °F (36.6 °C)] 97.9 °F (36.6 °C)  Pulse:  [] 88  Resp:  [17-22] 17  SpO2:  [97 %-100 %] 100 %  BP: (140-173)/(78-98) 140/78     Weight: 98.2 kg (216 lb 7.9 oz)  Body mass index is 30.19 kg/m².      Intake/Output Summary (Last 24 hours) at 3/13/2019 1459  Last data filed at 3/13/2019 0726  Gross per 24 hour   Intake 860 ml   Output 2250 ml   Net -1390 ml       Significant Labs:  CBC:  Recent Labs   Lab 03/13/19  0631   WBC 8.92   RBC 2.97*   HGB 9.0*   HCT 28.2*   *   MCV 95   MCH 30.3   MCHC 31.9*     BMP:  Recent Labs   Lab 03/13/19  0631      K 4.0      CO2 29   BUN 25*   CREATININE 2.0*   CALCIUM 9.7      Tacrolimus Levels:  Recent Labs   Lab 03/13/19  0631   TACROLIMUS 1.5*     Microbiology:  Microbiology Results (last 7 days)     Procedure Component Value Units Date/Time    Blood culture #2 **CANNOT BE ORDERED STAT** [[id number]] Collected:  03/03/19 1443    Order Status:  Completed Specimen:  Blood from Peripheral, Wrist, Left Updated:  03/08/19 1612     Blood Culture, Routine No growth after 5 days.    Blood culture #1 **CANNOT BE ORDERED STAT** [[id number]] Collected:  03/03/19 1352    Order Status:  Completed Specimen:  Blood from Peripheral, Antecubital, Left Updated:  03/08/19 1612     Blood Culture, Routine No growth after 5 days.          I have reviewed all pertinent labs within the past 24 hours.        Assessment/Plan:     * Cellulitis of right upper extremity    Initially had a WBC of 21, which was concerning for possible bacteremia versus cellulitis from PICC line. PICC removed while in ER.  Tip sent for culture with NGTD. Blood cultures x2 with NGTD. Procalcitonin significantly elevated at 11.32 at admit.  Was on IV vancomycin and cefepime, but Cefepime changed to ceftriaxone per ID recs on 3/8. Vanc d/c'd 3/11.     Continue ceftriaxone x 14 days. IV infusion orders placed. Plan for discharge tomorrow.      Acute kidney injury    Creatinine 2 and continues to slowly downtrend. Continue to trend as inpatient. Will resume Foscarnet infusions outpatient.      Lung replaced by transplant    Patient is s/p BLT 18 months ago secondary to sarcoidosis with associated pulmonary hypertension. 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 Last Foscarnet infusion on 3/2 PM dose.    Continue immunosuppression and ppx.      Immunosuppression    Tacrolimus held for AVILA and resumed at 0.5 mg BID on 3/10 PM. Tacrolimus increased to 1.5 mg BID today. Will likely need further dose adjustments as outpatient.  Will monitor daily tacrolimus levels daily while inpatient. 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 Continue prednisone 5 mg daily. Will consider everolimus in addition to current immunosuppression as outpatient.      Prophylactic antibiotic    Continue to hold Bactrim in setting of AVILA. Will plan to resume as Bactrim SS as outpatient.      Cytomegalovirus (CMV) viremia    Started on Foscarnet IV infusions BID on 2/13/2019 and has been closely followed by ID, Dr. Lowry.  Will continue to hold Foscarnet infusions for now while awaiting resolution of AVILA and electrolyte imbalances. Most recent CMV PCR on 3/3 was 63. Repeat CMV PCR drawn 3/12 and is pending. ID following, appreciate recs. Will plan to resume Foscarnet infusions as outpatient.      Hypomagnesemia    Secondary to Foscarnet infusions.  Continue to monitor and replace as needed.     Type 2 diabetes mellitus with hyperglycemia, with long-term current use of insulin    Endocrine consulted, appreciate recs.     Acute deep vein thrombosis (DVT)    Apixaban started 3/5 for RUE and LLE DVT. Will plan to resume apixaban at 2.5 mg BID tomorrow morning.          Patricia Fowler PA-C  Lung Transplant  [facility]

## 2019-03-13 NOTE — CONSULTS
Nephrology H&P      Consult Requested By: Darrick Wolfe MD  Reason for Consult: vásquez catheter    SUBJECTIVE:     History of Present Illness:  Patient is a 58 y.o. male with BLT who LARRY consulted for vásquez catheter placement for the treatment of CMV viremia. Patient is doing well and has no complaints.     PTA Medications   Medication Sig    amLODIPine (NORVASC) 5 MG tablet Take 1 tablet (5 mg total) by mouth once daily.    CALCIUM 600 + D,3, 600 mg(1,500mg) -200 unit Tab TAKE 1 TABLET BY MOUTH TWICE DAILY    ECOTRIN LOW STRENGTH 81 mg EC tablet TAKE 1 TABLET DAILY    famotidine (PEPCID) 20 MG tablet TAKE 1 TABLET TWICE A DAY    fluticasone (FLONASE) 50 mcg/actuation nasal spray 1 spray by Each Nare route once daily.    folic acid (FOLVITE) 1 MG tablet TAKE 1 TABLET DAILY    foscarnet in dextrose 5 % infusion Inject 750 mLs (9,000 mg total) into the vein every 12 (twelve) hours.    insulin degludec (TRESIBA FLEXTOUCH U-200) 200 unit/mL (3 mL) InPn Inject 20 Units into the skin every evening.    insulin lispro (HUMALOG KWIKPEN INSULIN) 100 unit/mL InPn pen Inject 14 Units into the skin 3 (three) times daily before meals. + correction scale TDD: 60 units    metFORMIN (GLUCOPHAGE-XR) 500 MG 24 hr tablet TAKE 2 TABLETS BY MOUTH TWICE DAILY WITH MEALS    multivitamin (THERAGRAN) per tablet Take 1 tablet by mouth once daily.    pravastatin (PRAVACHOL) 20 MG tablet TAKE 1 TABLET BY MOUTH ONCE DAILY    predniSONE (DELTASONE) 5 MG tablet Take 1 tablet (5 mg total) by mouth once daily.    [DISCONTINUED] potassium chloride (KLOR-CON) 10 MEQ TbSR Take 1 tablet (10 mEq total) by mouth once daily.    [DISCONTINUED] sulfamethoxazole-trimethoprim 800-160mg (BACTRIM DS) 800-160 mg Tab Take 1 tablet by mouth every Mon, Wed, Fri.    furosemide (LASIX) 40 MG tablet Take 1 tablet (40 mg total) by mouth daily as needed.    lancets Misc To check BG 4 times daily, to use with insurance preferred meter     "ONETOUCH VERIO Strp USE TO CHECK BLOOD GLUCOSE FOUR TIMES A DAY, TO USE WITH INSURANCE PREFERRED METER    pen needle, diabetic (BD ULTRA-FINE JIM PEN NEEDLES) 32 gauge x 5/32" Ndle Uses 4 times daily, on multiple daily insulin injections    [DISCONTINUED] potassium chloride SA (K-DUR,KLOR-CON) 20 MEQ tablet Take 1 tablet (20 mEq total) by mouth once daily.       Review of patient's allergies indicates:  No Known Allergies     Past Medical History:   Diagnosis Date    AVILA (acute kidney injury) 8/27/2017    Atrial fibrillation 8/23/2017    On home oxygen therapy     KRISTYN on CPAP     Osteopenia     Pancreatitis 2009    hospitalized    Pulmonary hypertension     Pure hypercholesterolemia 11/15/2017    Sarcoidosis     Shortness of breath     Type 2 diabetes mellitus 11/5/2017     Past Surgical History:   Procedure Laterality Date    ABDOMINAL SURGERY      6 weeks old    BRONCHOSCOPY      CHEST TUBE INSERTION      CHOLECYSTECTOMY      COLONOSCOPY      EGD (ESOPHAGOGASTRODUODENOSCOPY) N/A 8/6/2018    Performed by Ramu Eisenberg MD at [facility] ENDO (2ND FLR)    flexible bronchoscopy with tissue biopsy N/A 3/21/2018    Performed by [facility] Diagnostic Provider at [facility] OR 2ND FLR    flexible bronchoscopy with tissue biopsy CPT 51638 N/A 8/22/2018    Performed by [facility] Diagnostic Provider at [facility] 2ND FLR    flexible bronchoscopy with tissue biopsy CPT 78119 N/A 4/19/2018    Performed by [facility] Diagnostic Provider at [facility] OR 2ND FLR    flexible bronchoscopy with tissue biopsy CPT 26544 N/A 2/21/2018    Performed by [facility] Diagnostic Provider at [facility] 2ND FLR    flexible bronchoscopy with tissue biopsy CPT 10308 N/A 12/20/2017    Performed by [facility] Diagnostic Provider at [facility] 2ND FLR    flexible bronchoscopy with tissue biopsy CPT 23872 N/A 11/22/2017    Performed by [facility] Diagnostic Provider at [facility] 2ND FLR    flexible bronchoscopy with tissue biopsy CPT 07533 N/A 11/2/2017    Performed by [facility] " Diagnostic Provider at [facility] OR 2ND FLR    flexible bronchoscopy with tissue biopsy CPT 78893 N/A 10/4/2017    Performed by [facility] Diagnostic Provider at [facility] OR 2ND FLR    flexible bronchoscopy with tissue biopy CPT 45222 N/A 11/20/2018    Performed by [facility] Diagnostic Provider at [facility] OR 2ND FLR    HEART CATH-RIGHT Right 5/22/2017    Performed by Shanell Higuera MD at [facility] CATH LAB    LUNG BIOPSY      LUNG TRANSPLANT  08/2017    PH MONITORING, ESOPHAGUS, WIRELESS, (OFF REFLUX MEDS) N/A 8/6/2018    Performed by Ramu Eisenberg MD at [facility] ENDO (2ND FLR)    TRANSPLANT-LUNG Bilateral 8/22/2017    Performed by Paulo German MD at [facility] OR 2ND FLR     Family History   Problem Relation Age of Onset    Hypertension Mother     Diabetes Father     Kidney disease Sister     Diabetes Brother      Social History     Tobacco Use    Smoking status: Never Smoker    Smokeless tobacco: Never Used   Substance Use Topics    Alcohol use: No    Drug use: No       Review of Systems:  Constitutional: Negative for fatigue.   Eyes: Negative for discharge.   Respiratory: Negative for cough, shortness of breath and wheezing.   Cardiovascular: Negative for chest pain and palpitations.   Gastrointestinal: Negative for nausea, vomiting, abdominal pain and diarrhea.   Genitourinary: Negative for dysuria, urgency, frequency and hematuria.   Skin: Negative for color change and rash.   Psychiatric/Behavioral: Negative for confusion.      OBJECTIVE:     Vital Signs (Most Recent)  Temp: 97.9 °F (36.6 °C) (03/13/19 1255)  Pulse: 88 (03/13/19 1255)  Resp: 17 (03/13/19 1255)  BP: (!) 140/78 (03/13/19 1255)  SpO2: 100 % (03/13/19 1255)         Intake/Output Summary (Last 24 hours) at 3/13/2019 1302  Last data filed at 3/13/2019 0726  Gross per 24 hour   Intake 1340 ml   Output 2250 ml   Net -910 ml       Physical Exam:  Gen: AAOx3, NAD  HEENT: mmm  Neck: no bruit, no JVD  CV: RRR, no m/r  Resp: CTAx2, normal effort  GI: soft, ND, NTTP,  +BS  Extr: no LE edema, Right arm edema   Neuro: normal reflexes, no focal deficits      Laboratory:  CBC with Diff:   Recent Labs   Lab 03/11/19  0630 03/12/19  0642 03/13/19  0631   WBC 10.65 8.78 8.92   HGB 10.4* 9.0* 9.0*   HCT 32.9* 27.8* 28.2*   * 377* 377*   LYMPH 9.0* 9.8*  0.9* 9.5*  0.9*   MONO 2.0* 11.0  1.0 12.4  1.1*   EOSINOPHIL 3.0 3.2 2.5     COAG:  No results for input(s): APTT, INR, PTT in the last 168 hours.    CMP:   Recent Labs   Lab 03/11/19  0630 03/12/19  0642 03/13/19  0631    114* 102   CALCIUM 10.1 9.6 9.7   ALBUMIN 3.1* 2.9* 2.9*   PROT 6.8 6.5 6.5    141 143   K 3.6 4.1 4.0   CO2 30* 29 29    102 104   BUN 27* 25* 25*   CREATININE 2.2* 2.1* 2.0*   ALKPHOS 65 61 63   ALT 24 26 24   AST 21 26 21   BILITOT 0.3 0.3 0.2   MG 1.5* 1.5* 1.3*   PHOS 4.7* 4.7* 4.7*         Lab Results   Component Value Date    LABPROT 11.7 08/23/2017     Lab Results   Component Value Date    CALCIUM 9.7 03/13/2019    PHOS 4.7 (H) 03/13/2019     No results found for: IRON, TIBC, FERRITIN      Diagnostic Results:  Labs: Reviewed      Scheduled Meds:   amLODIPine  5 mg Oral Daily    aspirin  81 mg Oral Daily    cefTRIAXone (ROCEPHIN) IVPB  2 g Intravenous Q24H    famotidine  20 mg Oral BID    fluticasone  1 spray Each Nare Daily    folic acid  1,000 mcg Oral Daily    insulin aspart U-100  7 Units Subcutaneous TIDWM    insulin detemir U-100  13 Units Subcutaneous QHS    magnesium chloride  128 mg Oral Daily    magnesium chloride  64 mg Oral QHS    potassium chloride SA  20 mEq Oral Daily    pravastatin  20 mg Oral Daily    predniSONE  5 mg Oral Daily    tacrolimus  1.5 mg Oral BID     Continuous Infusions:          ASSESSMENT/PLAN:     Patient Active Problem List   Diagnosis    Sarcoidosis of lung    Secondary pulmonary hypertension    Chronic respiratory failure with hypoxia    Chronic pulmonary heart disease    Pulmonary hypertension associated with sarcoidosis     Adrenal cortical steroids causing adverse effect in therapeutic use    Immunosuppression    Class 1 obesity due to excess calories with serious comorbidity and body mass index (BMI) of 30.0 to 30.9 in adult    Lung transplanted    Prophylactic antibiotic    Cellulitis of right upper extremity    Paroxysmal atrial fibrillation    Lung replaced by transplant    Dysphonia    Hyponatremia    Hyperkalemia    Unilateral complete vocal fold paralysis    Complication of transplanted lung    Acute rejection of lung transplant    LRTI (lower respiratory tract infection)    Type 2 diabetes mellitus with hyperglycemia, with long-term current use of insulin    Pure hypercholesterolemia    Pulmonary aspergilloma    Encounter following organ transplant    GERD (gastroesophageal reflux disease)    CMV (cytomegalovirus) infection    Mixed hyperlipidemia    BMI 31.0-31.9,adult    Cytomegalovirus (CMV) viremia    Hypomagnesemia    Acute kidney injury    Acute deep vein thrombosis (DVT)       Plan:     Will place tunneled vásquez catheter for the treatment of CMV viremia

## 2019-03-13 NOTE — ASSESSMENT & PLAN NOTE
Initially had a WBC of 21, which was concerning for possible bacteremia versus cellulitis from PICC line. PICC removed while in ER. Tip sent for culture with NGTD. Blood cultures x2 with NGTD. Procalcitonin significantly elevated at 11.32 at admit.  Was on IV vancomycin and cefepime, but Cefepime changed to ceftriaxone per ID recs on 3/8. Vanc d/c'd 3/11.     Continue ceftriaxone x 14 days. IV infusion orders placed. Plan for discharge tomorrow.

## 2019-03-13 NOTE — PLAN OF CARE
Problem: Adult Inpatient Plan of Care  Goal: Plan of Care Review  Assessment and Plan     Nutrition Problem  Altered nutrition-related lab values     Related to (etiology):   Uncontrolled DM2     Signs and Symptoms (as evidenced by):   Pt with A1c 10.9% (Feb 2019)      Interventions/Recommendations (treatment strategy):  Collaboration of care.     Nutrition Diagnosis Status:   New    Recommendations     Recommendation/Intervention: 1. When feasible, continue diabetic diet; change kcal amount to 2000 kcal.  Goals: 1. Pt's intake >75% meals daily.  Nutrition Goal Status: new  Communication of RD Recs: (POC)

## 2019-03-13 NOTE — ASSESSMENT & PLAN NOTE
Started on Foscarnet IV infusions BID on 2/13/2019 and has been closely followed by ID, Dr. Lowry.  Will continue to hold Foscarnet infusions for now while awaiting resolution of AVILA and electrolyte imbalances. Most recent CMV PCR on 3/3 was 63. Repeat CMV PCR drawn 3/12 and is pending. ID following, bernie recs. Will plan to resume Foscarnet infusions as outpatient.

## 2019-03-13 NOTE — ASSESSMENT & PLAN NOTE
Apixaban started 3/5 for RUE and LLE DVT. Will plan to resume apixaban at 2.5 mg BID tomorrow morning.

## 2019-03-13 NOTE — PROGRESS NOTES
"[facility]  Adult Nutrition  Progress Note    SUMMARY       Recommendations    Recommendation/Intervention: 1. When feasible, continue diabetic diet; change kcal amount to 2000 kcal.  Goals: 1. Pt's intake >75% meals daily.  Nutrition Goal Status: new  Communication of RD Recs: (POC)    Reason for Assessment    Reason For Assessment: length of stay  Diagnosis: other (see comments)(cellulitis right upper extremity)  Relevant Medical History: BLTx 10/2017 2/2 sarcoidosis/pulmonary HTN, HLD, DM2,   Interdisciplinary Rounds: attended  General Information Comments: Pt admitted with N/V/D for several days, resolved. Educated pt with uncontrolled DM2 in prior admission last month; blood glucose is <130 mg/dl today. Pt off unit for test, unable to assess.  Nutrition Discharge Planning: Pt with adequate po intake to meet nutrition needs.    Nutrition Risk Screen    Nutrition Risk Screen: no indicators present    Nutrition/Diet History    Spiritual, Cultural Beliefs, Scientologist Practices, Values that Affect Care: no    Anthropometrics    Temp: 97.9 °F (36.6 °C)  Height Method: Stated  Height: 5' 11" (180.3 cm)  Height (inches): 71 in  Weight Method: Bed Scale  Weight: 98.2 kg (216 lb 7.9 oz)  Weight (lb): 216.49 lb  Ideal Body Weight (IBW), Male: 172 lb  % Ideal Body Weight, Male (lb): 127.91 lb  BMI (Calculated): 30.7       Lab/Procedures/Meds    Pertinent Labs Comments: Glu 102, no current A1c, BUn 25, Creat 2, eGFR 36.2, Phos 4.7, Mag 1.3, Alb 2.9  Pertinent Medications Comments: folic acid, aspart/detemir, prograf, prednisone, statin    Estimated/Assessed Needs    Weight Used For Calorie Calculations: 98.2 kg (216 lb 7.9 oz)  Energy Calorie Requirements (kcal): 2293 kcal  Energy Need Method: Simpson-St Jeor(PAL 1.25)  Protein Requirements: 98-118g  Weight Used For Protein Calculations: 98.2 kg (216 lb 7.9 oz)        RDA Method (mL): 2293  CHO Requirement: 50% total kcal      Nutrition Prescription " Ordered    Current Diet Order: NPO    Evaluation of Received Nutrient/Fluid Intake    Comments: LBM 3/12  % Intake of Estimated Energy Needs: 75 - 100 %  % Meal Intake: 75 - 100 %    Nutrition Risk    Level of Risk/Frequency of Follow-up: low     Assessment and Plan    Nutrition Problem  Altered nutrition-related lab values    Related to (etiology):   Uncontrolled DM2    Signs and Symptoms (as evidenced by):   Pt with A1c 10.9% (Feb 2019)     Interventions/Recommendations (treatment strategy):  Collaboration of care.    Nutrition Diagnosis Status:   New    Monitor and Evaluation    Food and Nutrient Intake: energy intake, food and beverage intake  Food and Nutrient Adminstration: diet order  Knowledge/Beliefs/Attitudes: food and nutrition knowledge/skill  Anthropometric Measurements: weight, weight change, body mass index  Biochemical Data, Medical Tests and Procedures: electrolyte and renal panel, gastrointestinal profile, glucose/endocrine profile, inflammatory profile, lipid profile  Nutrition-Focused Physical Findings: (unable to assess)     Malnutrition Assessment      Unable to physically assess pt at this time.      Nutrition Follow-Up    RD Follow-up?: Yes

## 2019-03-13 NOTE — ASSESSMENT & PLAN NOTE
Tacrolimus held for AVILA and resumed at 0.5 mg BID on 3/10 PM. Tacrolimus increased to 1.5 mg BID today. Will likely need further dose adjustments as outpatient.  Will monitor daily tacrolimus levels daily while inpatient. 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 Continue prednisone 5 mg daily. Will consider everolimus in addition to current immunosuppression as outpatient.

## 2019-03-13 NOTE — PROGRESS NOTES
Patient to be discharged home later this evening following tunneled central line placement and approval of home IV antibiotic therapy.  Discharge instructions and outpatient plan of care as determined by Dr. Wolfe were reviewed with the patient as follows:  1.  Treatment for RUE cellulitis:  Ceftriaxone (Rocephin) 2 grams IV via tunneled catheter once a day at 1800 through Tuesday, March 19, 2019.  Patient is already established with [facility] and opted to continuing using this provider for infusion services.  Explained that an infusion nurse liaison will meet with him to review the home antibiotic infusion procedure prior to discharge.  2.  Blood sugar management per endocrinology recommendations:  - Discontinue metformin  - Continue previous home doses of Insulin  - Use new Insulin correction scale as directed by the endocrinology provider.  Patient was given a written copy of this new correction scale and blood sugar log sheets by endocrinology provider Delroy Vergara NP.    - Send blood sugar log sheets to his outpatient endocrinology provider Shailesh Hassan NP once a week via [employer].  - Bring blood sugar logs to endocrinology clinic visit with SAUNDRA Hassan on April 24, 2019.  3.  Labs (CBC, CMP, tacrolimus level, everolimus level, CMV PCR) on Monday, March 18, 2019. Instructed patient to take his morning doses of Prograf and Zortress after his labs are drawn.  He asked that I schedule this lab appointment at the [facility] location at 0730 on that day.  4.  Return for a chest xray, labs, spirometry and lung transplant clinic visit on Friday, March 22, 2019.  Reviewed times, location and special instructions (e.g., morning doses of Prograf and Zortress after his blood is drawn) for these appointments.  Patient is also scheduled for lab work ordered by another provider that day.  Informed patient that those orders can be combined with the lab appointment we schedule.    Provided verbal and  written instructions re: this discharge plan.  Also explained that Thony Lyles PharmD will review all home medications including changes to Prograf dose, new Zortress (everolimus) therapy, electrolyte supplements, and new apixaban (Eliquis) therapy, and provide him with an updated medication card.  The patient asked questions, which were answered to his satisfaction.  He repeated all information back to me correctly and demonstrated his readiness for discharge.

## 2019-03-13 NOTE — ASSESSMENT & PLAN NOTE
Creatinine 2 and continues to slowly downtrend. Continue to trend as inpatient. Will resume [facility] infusions outpatient.

## 2019-03-13 NOTE — PROCEDURES
Surgery Date: 3/13/19    (s) and Role:   Edy Gonzalez MD    Indication: IV antibiotics     Pre-op Diagnosis:    CMV Viremia   Cellulitis     Post-op Diagnosis;  CMV Viremia   Cellulitis     Procedure:   1. Insertion Tunneled, Cuffed, Double Lumen Medellin   2. Conscious Sedation     Anesthesia: IV Sedation + Local     Findings/Key Components:   Catheter placed in RA-SVC junction. Good flush and draw through each port.  Estimated Blood Loss: 5mL   Specimens     None         PROCEDURE: After obtaining consent, the patient was taken to the [facility] suite. Sedation was administered. The neck and chest were prepped and draped in usual sterile fashion. We began using ultrasound guidance to examine the right internal jugular vein. It appeared to be widely patent and was free of thrombus that appeared suitable for access for medellin catheter and a permanent recording was placed on the patient's chart. We accessed the internal jugular vein using a Seldinger technique with an micropunture needle with out difficulty, micropuncture wire was passed into the superior vena cava through the heart into the inferior vena cava. The wire position was confirmed with intraoperative fluoroscopy, then used a dilators to dilate the tract of the catheter. Counterincision was made on the chest wall just below the clavicle. Local anesthesia was used to anesthetize the track and a tunneler was used to pass the catheter underneath the chest wall until the felt cuff was just underneath the counter incision. The large peel-away sheath was then inserted over the guidewire under fluoroscopic guidance. The dilator and wire were removed. The catheter was placed through the peel-away sheath and positioned appropriately at the right atrial SVC junction. Fluoroscopy was used to confirm that the catheter tip was in appropriate position and there was no kinking at the apex of the catheter. A permanent recording of the catheter position was also  taken with fluoroscopy. Port had excellent draw and flush. The catheter was then secured in place with 3-0 Prolene. The counterincision in the neck was closed with a 3-0 Vicryl. Patient tolerated procedure well and was discharged in stable condition.     Anesthesia:  Conscious sedation was achieved with 100 mcg of Fentanyl and 1 mg of Midazolam (Versed) 1MG/1ML. Local anesthesia was achieved with 20 ml of Lidocaine 2%. Moderate conscious sedation was performed and cardiorespiratory functions were monitored the entire procedure by me and RN. Sedation time 20 minutes.

## 2019-03-13 NOTE — SUBJECTIVE & OBJECTIVE
Subjective:     Interval History: No acute events overnight. RUE edema stable. Tolerated placement of right tunneled cath well without complications this afternoon. Patient did receive conscious sedation, so will defer discharge home today as patient will have to drive himself home. Diet resumed. Eliquis to be resumed tomorrow AM. Tacrolimus dosing increased as AVILA has improved. Patient updated on plan of care for discharge tomorrow morning.     Continuous Infusions:  Scheduled Meds:   amLODIPine  5 mg Oral Daily    aspirin  81 mg Oral Daily    cefTRIAXone (ROCEPHIN) IVPB  2 g Intravenous Q24H    famotidine  20 mg Oral BID    fluticasone  1 spray Each Nare Daily    folic acid  1,000 mcg Oral Daily    insulin aspart U-100  7 Units Subcutaneous TIDWM    insulin detemir U-100  13 Units Subcutaneous QHS    magnesium chloride  128 mg Oral Daily    magnesium chloride  64 mg Oral QHS    potassium chloride SA  20 mEq Oral Daily    pravastatin  20 mg Oral Daily    predniSONE  5 mg Oral Daily    tacrolimus  1.5 mg Oral BID     PRN Meds:acetaminophen, calcium gluconate IVPB, calcium gluconate IVPB, calcium gluconate IVPB, dextrose 50%, dextrose 50%, fentaNYL, glucagon (human recombinant), glucose, glucose, insulin aspart U-100, magnesium sulfate IVPB **AND** magnesium sulfate IVPB, midazolam, ondansetron, potassium chloride 10% **AND** potassium chloride 10% **AND** potassium chloride 10%, promethazine (PHENERGAN) IVPB    Review of patient's allergies indicates:  No Known Allergies    Review of Systems   Constitutional: Negative for chills, diaphoresis and fever.   HENT: Negative for congestion, rhinorrhea, sinus pain, sore throat and voice change.    Eyes: Negative for discharge and redness.   Respiratory: Negative for cough, shortness of breath and wheezing.    Cardiovascular: Positive for leg swelling. Negative for chest pain.   Gastrointestinal: Negative for abdominal distention, abdominal pain, diarrhea,  nausea and vomiting.   Endocrine: Negative for polydipsia and polyuria.   Genitourinary: Negative for dysuria, frequency and urgency.   Musculoskeletal: Negative for back pain, myalgias, neck pain and neck stiffness.   Skin: Negative for color change and pallor.   Allergic/Immunologic: Positive for immunocompromised state.   Neurological: Negative for dizziness, light-headedness and headaches.   Hematological: Negative for adenopathy. Does not bruise/bleed easily.   Psychiatric/Behavioral: Negative for agitation, confusion and decreased concentration.     Objective:   Physical Exam   Constitutional: He is oriented to person, place, and time. He appears well-developed and well-nourished. No distress.   HENT:   Head: Normocephalic and atraumatic.   Right Ear: External ear normal.   Left Ear: External ear normal.   Nose: Nose normal.   Mouth/Throat: Oropharynx is clear and moist.   Eyes: Conjunctivae and EOM are normal. Right eye exhibits no discharge. Left eye exhibits no discharge.   Neck: Normal range of motion. Neck supple. No JVD present.   Cardiovascular: Normal rate, regular rhythm and normal heart sounds. Exam reveals no gallop and no friction rub.   No murmur heard.  Pulmonary/Chest: Effort normal. He has no wheezes. He has no rhonchi. He has no rales.   Well-healed mid-line sternotomy incision   Abdominal: Soft. Bowel sounds are normal. He exhibits no distension. There is no tenderness.   Musculoskeletal: Normal range of motion. He exhibits edema (RUE improving, no erythema/induration; trace edema bilateral lower extremities ).   Neurological: He is alert and oriented to person, place, and time.   Skin: Skin is warm and dry. He is not diaphoretic.   Psychiatric: He has a normal mood and affect. His behavior is normal.   Nursing note and vitals reviewed.        Vital Signs (Most Recent):  Temp: 97.9 °F (36.6 °C) (03/13/19 1255)  Pulse: 88 (03/13/19 1255)  Resp: 17 (03/13/19 1255)  BP: (!) 140/78 (03/13/19  1255)  SpO2: 100 % (03/13/19 1255) Vital Signs (24h Range):  Temp:  [97.3 °F (36.3 °C)-97.9 °F (36.6 °C)] 97.9 °F (36.6 °C)  Pulse:  [] 88  Resp:  [17-22] 17  SpO2:  [97 %-100 %] 100 %  BP: (140-173)/(78-98) 140/78     Weight: 98.2 kg (216 lb 7.9 oz)  Body mass index is 30.19 kg/m².      Intake/Output Summary (Last 24 hours) at 3/13/2019 1459  Last data filed at 3/13/2019 0726  Gross per 24 hour   Intake 860 ml   Output 2250 ml   Net -1390 ml       Significant Labs:  CBC:  Recent Labs   Lab 03/13/19  0631   WBC 8.92   RBC 2.97*   HGB 9.0*   HCT 28.2*   *   MCV 95   MCH 30.3   MCHC 31.9*     BMP:  Recent Labs   Lab 03/13/19  0631      K 4.0      CO2 29   BUN 25*   CREATININE 2.0*   CALCIUM 9.7      Tacrolimus Levels:  Recent Labs   Lab 03/13/19  0631   TACROLIMUS 1.5*     Microbiology:  Microbiology Results (last 7 days)     Procedure Component Value Units Date/Time    Blood culture #2 **CANNOT BE ORDERED STAT** [[id number]] Collected:  03/03/19 1443    Order Status:  Completed Specimen:  Blood from Peripheral, Wrist, Left Updated:  03/08/19 1612     Blood Culture, Routine No growth after 5 days.    Blood culture #1 **CANNOT BE ORDERED STAT** [[id number]] Collected:  03/03/19 1352    Order Status:  Completed Specimen:  Blood from Peripheral, Antecubital, Left Updated:  03/08/19 1612     Blood Culture, Routine No growth after 5 days.          I have reviewed all pertinent labs within the past 24 hours.

## 2019-03-13 NOTE — PLAN OF CARE
Problem: Adult Inpatient Plan of Care  Goal: Plan of Care Review  Outcome: Ongoing (interventions implemented as appropriate)  AAOX4.  VSS.  No complaints of pain.  NPO after midnight for probable line placement in the morning.  Pt ambulating in the halls.  Bed is in lowest position, call bell is in reach, and pt instructed to call nurse when needing assistance.  Will continue to monitor.

## 2019-03-13 NOTE — CARE UPDATE
BG goal 140-180.      Continue Levemir  13 units q HS  Continue Novolog  7 units with meals   Low dose correction scale - given kidney function  BG monitoring AC/HS  Continue ADA 2800 byron diet      Discharge Recommendations:  Discontinue Metformin (Kidney function remain elevated).  Continue home insulin. However, decrease doses unit-for-unit to   Correlate with current hospital dosage.   180 - 230 + 1 unit  231- 280  + 2 units  281 - 330 + 3 units  331 - 380 + 4 units      > 380   + 5 units        Have patient send logs and follow-up with Endocrinology discharge clinic as scheduled. Patient already has an appointment scheduled in the clinic.

## 2019-03-14 VITALS
SYSTOLIC BLOOD PRESSURE: 142 MMHG | WEIGHT: 207.69 LBS | HEIGHT: 71 IN | HEART RATE: 90 BPM | BODY MASS INDEX: 29.08 KG/M2 | DIASTOLIC BLOOD PRESSURE: 83 MMHG | OXYGEN SATURATION: 97 % | TEMPERATURE: 98 F | RESPIRATION RATE: 17 BRPM

## 2019-03-14 LAB
ALBUMIN SERPL BCP-MCNC: 3.4 G/DL
ALP SERPL-CCNC: 75 U/L
ALT SERPL W/O P-5'-P-CCNC: 36 U/L
ANION GAP SERPL CALC-SCNC: 9 MMOL/L
AST SERPL-CCNC: 32 U/L
BASOPHILS # BLD AUTO: 0.13 K/UL
BASOPHILS NFR BLD: 1.5 %
BILIRUB SERPL-MCNC: 0.3 MG/DL
BUN SERPL-MCNC: 22 MG/DL
CA-I BLDV-SCNC: 1.09 MMOL/L
CALCIUM SERPL-MCNC: 10.3 MG/DL
CHLORIDE SERPL-SCNC: 101 MMOL/L
CMV DNA SERPL NAA+PROBE-ACNC: 165 IU/ML
CO2 SERPL-SCNC: 31 MMOL/L
CREAT SERPL-MCNC: 1.9 MG/DL
DIFFERENTIAL METHOD: ABNORMAL
EOSINOPHIL # BLD AUTO: 0.3 K/UL
EOSINOPHIL NFR BLD: 3 %
ERYTHROCYTE [DISTWIDTH] IN BLOOD BY AUTOMATED COUNT: 15.3 %
EST. GFR  (AFRICAN AMERICAN): 44.6 ML/MIN/1.73 M^2
EST. GFR  (NON AFRICAN AMERICAN): 38.6 ML/MIN/1.73 M^2
GLUCOSE SERPL-MCNC: 107 MG/DL
HCT VFR BLD AUTO: 33 %
HGB BLD-MCNC: 10.4 G/DL
IMM GRANULOCYTES # BLD AUTO: 0.14 K/UL
IMM GRANULOCYTES NFR BLD AUTO: 1.6 %
LYMPHOCYTES # BLD AUTO: 1.3 K/UL
LYMPHOCYTES NFR BLD: 14.5 %
MAGNESIUM SERPL-MCNC: 1.5 MG/DL
MCH RBC QN AUTO: 29.7 PG
MCHC RBC AUTO-ENTMCNC: 31.5 G/DL
MCV RBC AUTO: 94 FL
MONOCYTES # BLD AUTO: 1.3 K/UL
MONOCYTES NFR BLD: 15 %
NEUTROPHILS # BLD AUTO: 5.6 K/UL
NEUTROPHILS NFR BLD: 64.4 %
NRBC BLD-RTO: 0 /100 WBC
PHOSPHATE SERPL-MCNC: 4.2 MG/DL
PLATELET # BLD AUTO: 411 K/UL
PMV BLD AUTO: 8.4 FL
POCT GLUCOSE: 102 MG/DL (ref 70–110)
POCT GLUCOSE: 126 MG/DL (ref 70–110)
POTASSIUM SERPL-SCNC: 4.3 MMOL/L
PROT SERPL-MCNC: 7.5 G/DL
RBC # BLD AUTO: 3.5 M/UL
SODIUM SERPL-SCNC: 141 MMOL/L
TACROLIMUS BLD-MCNC: 3.8 NG/ML
WBC # BLD AUTO: 8.67 K/UL

## 2019-03-14 PROCEDURE — 85025 COMPLETE CBC W/AUTO DIFF WBC: CPT

## 2019-03-14 PROCEDURE — 63600175 PHARM REV CODE 636 W HCPCS: Performed by: INTERNAL MEDICINE

## 2019-03-14 PROCEDURE — 80197 ASSAY OF TACROLIMUS: CPT

## 2019-03-14 PROCEDURE — 25000003 PHARM REV CODE 250: Performed by: INTERNAL MEDICINE

## 2019-03-14 PROCEDURE — 25000003 PHARM REV CODE 250: Performed by: PHYSICIAN ASSISTANT

## 2019-03-14 PROCEDURE — 80053 COMPREHEN METABOLIC PANEL: CPT

## 2019-03-14 PROCEDURE — 36415 COLL VENOUS BLD VENIPUNCTURE: CPT

## 2019-03-14 PROCEDURE — 84100 ASSAY OF PHOSPHORUS: CPT

## 2019-03-14 PROCEDURE — 99238 PR HOSPITAL DISCHARGE DAY,<30 MIN: ICD-10-PCS | Mod: ,,, | Performed by: INTERNAL MEDICINE

## 2019-03-14 PROCEDURE — 82330 ASSAY OF CALCIUM: CPT

## 2019-03-14 PROCEDURE — 63600175 PHARM REV CODE 636 W HCPCS: Performed by: PHYSICIAN ASSISTANT

## 2019-03-14 PROCEDURE — 83735 ASSAY OF MAGNESIUM: CPT

## 2019-03-14 PROCEDURE — 99238 HOSP IP/OBS DSCHRG MGMT 30/<: CPT | Mod: ,,, | Performed by: INTERNAL MEDICINE

## 2019-03-14 RX ADMIN — FLUTICASONE PROPIONATE 50 MCG: 50 SPRAY, METERED NASAL at 08:03

## 2019-03-14 RX ADMIN — FOLIC ACID 1000 MCG: 1 TABLET ORAL at 08:03

## 2019-03-14 RX ADMIN — FAMOTIDINE 20 MG: 20 TABLET ORAL at 08:03

## 2019-03-14 RX ADMIN — ASPIRIN 81 MG: 81 TABLET, COATED ORAL at 08:03

## 2019-03-14 RX ADMIN — MAGNESIUM 64 MG (MAGNESIUM CHLORIDE) TABLET,DELAYED RELEASE 128 MG: at 11:03

## 2019-03-14 RX ADMIN — AMLODIPINE BESYLATE 5 MG: 5 TABLET ORAL at 08:03

## 2019-03-14 RX ADMIN — PRAVASTATIN SODIUM 20 MG: 20 TABLET ORAL at 08:03

## 2019-03-14 RX ADMIN — TACROLIMUS 1 MG: 1 CAPSULE ORAL at 08:03

## 2019-03-14 RX ADMIN — EVEROLIMUS 1.5 MG: 0.75 TABLET ORAL at 08:03

## 2019-03-14 RX ADMIN — INSULIN ASPART 7 UNITS: 100 INJECTION, SOLUTION INTRAVENOUS; SUBCUTANEOUS at 07:03

## 2019-03-14 RX ADMIN — POTASSIUM CHLORIDE 20 MEQ: 1500 TABLET, EXTENDED RELEASE ORAL at 08:03

## 2019-03-14 RX ADMIN — PREDNISONE 5 MG: 5 TABLET ORAL at 08:03

## 2019-03-14 RX ADMIN — INSULIN ASPART 7 UNITS: 100 INJECTION, SOLUTION INTRAVENOUS; SUBCUTANEOUS at 12:03

## 2019-03-14 RX ADMIN — APIXABAN 2.5 MG: 2.5 TABLET, FILM COATED ORAL at 08:03

## 2019-03-14 NOTE — PLAN OF CARE
Problem: Adult Inpatient Plan of Care  Goal: Plan of Care Review  Outcome: Ongoing (interventions implemented as appropriate)  AAOX4.  VSS.  No complaints of pain.  Pt had line placed yesterday and will be discharged today.  Bed is in lowest position, call bell is in reach, and pt instructed to call nurse when needing assistance.  Will continue to monitor.

## 2019-03-14 NOTE — NURSING
Patient ready for dc home. Printed AVS reviewed and blue med card has been updated by PharmD. DC teaching completed yesterday bt tx coordinator. Followup appts per AVS. Home antibiotic supply has been delivered to patient's room along with needed Rx from outpatient pharmacy. Infusion nurse to visit to review admin procedures. Patient verbalizes undertstanding of dc instructions.

## 2019-03-14 NOTE — DISCHARGE SUMMARY
[facility]  Lung Transplant  Discharge Summary      Patient Name: Martin Hendrix Jr.  MRN: [id number]  Admission Date: 3/3/2019  Hospital Length of Stay: 11 days  Discharge Date and Time: 03/14/2019 10:46 AM  Attending Physician: Darrick oWlfe MD   Discharging Provider: Irwin Celestin NP  Primary Care Provider: Sukhi Dunham Jr, MD     HPI: Mr. Martin Hendrix is a 59 YO male s/p BLT 18 months ago secondary to sarcoidosis with associated pulmonary hypertension. 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 Last Foscarnet infusion on 3/2 PM dose.     Patient presents to the ED today following order from lung transplant service as patient notified LUT team of swelling of the right arm where the PICC was located and symptoms of nausea, vomiting, and diarrhea over the past several days. Patient reports that two days ago he initially noted swelling of the right arm inferior to the PICC site that this later resolved. Patient reports that yesterday the swelling returned yesterday and continued to progress this morning, which prompted his call. Patient does report slight tenderness at the PICC site. Patient also notes having chills, body aches, and increasing feelings of malaise and fatigue over the past few days. Patient report 4-5 episodes of non-bloody emesis over the past two days but denies any episodes of vomiting today. Patient reports increasing episodes of diarrhea over the past week but is unable to ascertain if this is related to his increasing magnesium oxide supplementation that he was started on. Patient endorses poor appetite over the past few days as well. Patient does note a slightly increasing in frequency nature of his  chronic, dry cough. Denies any dyspnea on exertion, chest pain. Denies any sinus congestion.      In the ED, 1.5L total of NS fluids were administered. CXR revealed mal-position of the PICC, and per communication with ED staff, the PICC site appeared infected, so the PICC was removed with the tip sent for cultures. Influenza testing negative. CBC remarkable for elevated WBC of 25. CMP remarkable for several electrolyte imbalances as well as AVILA with Cr of 2.6. Patient admitted for IVF rehydration as well as IV antibiotics.            Procedure(s) (LRB):  INSERTION, CATHETER, CENTRAL VENOUS, HEMODIALYSIS, TUNNELED (N/A)     Hospital Course:   Cellulitis of right upper extremity     Initially had a WBC of 21, which was concerning for possible bacteremia versus cellulitis from PICC line. PICC removed while in ER. Tip sent for culture with NGTD. Blood cultures x2 with NGTD. Procalcitonin significantly elevated at 11.32 at admit.  Was on IV vancomycin and cefepime, but Cefepime changed to ceftriaxone per ID recs on 3/8. Vanc d/c'd 3/11.    Continue ceftriaxone x 14 days via home IV infusion.    Acute kidney injury     Creatinine 1.9 and continues to slowly downtrend. Will continue to trend as outatient. Will resume Foscarnet infusions as outpatient per ID.   Lung replaced by transplant     Patient is s/p BLT 18 months ago secondary to sarcoidosis with associated pulmonary hypertension. 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 Last Foscarnet infusion on 3/2 PM dose.     Continue immunosuppression and ppx.     Immunosuppression     Tacrolimus held for AVILA and resumed at 0.5 mg BID on 3/10 PM. Tacrolimus increased to 1.5 mg BID on 3/12. Will likely need further dose adjustments as outpatient.  Will monitor  tacrolimus levels as outpatient.  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 Continue prednisone 5 mg daily.     Prophylactic antibiotic     Continue to hold Bactrim in setting of AVILA. Will plan to resume as Bactrim SS as outpatient.       Cytomegalovirus (CMV) viremia     Started on Foscarnet IV infusions BID on 2/13/2019 and has been closely followed by ID, Dr. Lowry.  Will continue to hold Foscarnet infusions for now while awaiting resolution of AVILA and electrolyte imbalances. Most recent CMV PCR on 3/3 was 63. Repeat CMV PCR drawn 3/12 and is pending. ID following, appreciate recs. Will plan to resume Foscarnet infusions as outpatient per ID.    Hypomagnesemia     Secondary to Foscarnet infusions.    Type 2 diabetes mellitus with hyperglycemia, with long-term current use of insulin     Endocrine consulted, appreciate recs.      Acute deep vein thrombosis (DVT)     Apixaban started 3/5 for RUE and LLE DVT. Will plan to resume apixaban at 2.5 mg BID today.               Consults (From admission, onward)        Status Ordering Provider     Inpatient consult to Endocrinology  Once     Provider:  (Not yet assigned)    Completed KWAME CARRILLO     Inpatient consult to Infectious Diseases  Once     Provider:  (Not yet assigned)    Completed KWAME CARRILLO     Inpatient consult to Midline team  Once     Provider:  (Not yet assigned)    Completed GAY CUI          Significant Diagnostic Studies: Labs:   BMP:   Recent Labs   Lab 03/13/19  0631 03/14/19  0619    107    141   K 4.0 4.3    101   CO2 29 31*   BUN 25* 22*   CREATININE 2.0* 1.9*   CALCIUM 9.7 10.3   MG 1.3* 1.5*   , CMP   Recent Labs   Lab 03/13/19  0631 03/14/19  0619    141   K 4.0 4.3    101   CO2 29 31*    107   BUN 25* 22*   CREATININE 2.0* 1.9*   CALCIUM 9.7 10.3   PROT 6.5 7.5   ALBUMIN 2.9* 3.4*   BILITOT 0.2 0.3   ALKPHOS 63 75   AST 21 32   ALT 24 36   ANIONGAP 10 9   ESTGFRAFRICA  41.9* 44.6*   EGFRNONAA 36.2* 38.6*    and CBC   Recent Labs   Lab 03/13/19  0631 03/14/19  0619   WBC 8.92 8.67   HGB 9.0* 10.4*   HCT 28.2* 33.0*   * 411*     Microbiology:   Blood Culture   Lab Results   Component Value Date    LABBLOO No growth after 5 days. 03/03/2019    and Sputum Culture   Lab Results   Component Value Date    GSRESP <10 epithelial cells per low power field. 11/20/2018    GSRESP Rare WBC's 11/20/2018    GSRESP Few Gram positive cocci 11/20/2018    GSRESP <10 epithelial cells per low power field. 11/20/2018    GSRESP Rare WBC's 11/20/2018    GSRESP No organisms seen 11/20/2018    RESPIRATORYC No growth 11/20/2018    RESPIRATORYC Normal respiratory mary 11/20/2018     Radiology: X-Ray: CXR: X-Ray Chest 1 View (CXR): No results found for this visit on 03/03/19. and X-Ray Chest PA and Lateral (CXR):   Results for orders placed or performed during the hospital encounter of 03/03/19   X-Ray Chest PA And Lateral    Narrative    EXAMINATION:  XR CHEST PA AND LATERAL    CLINICAL HISTORY:  Cough    TECHNIQUE:  PA and lateral views of the chest were performed.    COMPARISON:  Chest radiograph 02/13/2019    FINDINGS:  Sternotomy wires are aligned and intact.  Right-sided central venous catheter with tip migrated superiorly with tip overlying the right clavicular head.  The line creates an abnormal loop with projection over the right upper chest.    Persistent blunting of the left costophrenic sulcus suggestive of left-sided pleural effusion with associated compressive atelectasis.  No large consolidation or pneumothorax.    Cardiomediastinal silhouette is unchanged from prior.    Visualized osseus structures demonstrate degenerative changes and no acute findings.      Impression    Interval malpositioning of the right central venous catheter with tip now coiled over the head of right clavicle.  Tip possibly in the right brachiocephalic, azygos or internal thoracic veins.  Recommend  repositioning.    This report was flagged in Epic as abnormal.    Electronically signed by resident: Yvonne Santiago  Date:    03/03/2019  Time:    14:04    Electronically signed by: Maral Gutierrez  Date:    03/03/2019  Time:    14:28       Pending Diagnostic Studies:     Procedure Component Value Units Date/Time    CMV DNA, quantitative, PCR [[id number]] Collected:  03/12/19 0642    Order Status:  Sent Lab Status:  In process Updated:  03/12/19 0702    Specimen:  Blood         Final Active Diagnoses:    Diagnosis Date Noted POA    PRINCIPAL PROBLEM:  Cellulitis of right upper extremity [L03.113] 08/22/2017 Yes    Lung replaced by transplant [Z94.2] 08/24/2017 Not Applicable    Immunosuppression [D89.9] 08/22/2017 Yes    Prophylactic antibiotic [Z79.2] 08/22/2017 Not Applicable    Type 2 diabetes mellitus with hyperglycemia, with long-term current use of insulin [E11.65, Z79.4] 11/06/2017 Not Applicable     Chronic    Cytomegalovirus (CMV) viremia [B25.9] 02/12/2019 Yes    Acute kidney injury [N17.9] 03/03/2019 Yes    Acute deep vein thrombosis (DVT) [I82.409] 03/04/2019 Unknown    Hypomagnesemia [E83.42]  Yes    Class 1 obesity due to excess calories with serious comorbidity and body mass index (BMI) of 30.0 to 30.9 in adult [E66.09, Z68.30] 08/22/2017 Not Applicable      Problems Resolved During this Admission:    Diagnosis Date Noted Date Resolved POA    Hypokalemia [E87.6] 08/22/2017 03/11/2019 Unknown      Discharged Condition: stable    Disposition: Home or Self Care    Medications:  Reconciled Home Medications:      Medication List      START taking these medications    apixaban 2.5 mg Tab  Commonly known as:  ELIQUIS  Take 1 tablet (2.5 mg total) by mouth 2 (two) times daily.     everolimus (immunosuppressive) 0.75 mg tablet  Commonly known as:  ZORTRESS  Take 2 tablets (1.5 mg total) by mouth 2 (two) times daily.     magnesium chloride 64 mg Tbec  Commonly known as:  MAG 64  Take 2 tablets (128 mg  total) by mouth once daily.     sulfamethoxazole-trimethoprim 400-80mg 400-80 mg per tablet  Commonly known as:  BACTRIM,SEPTRA  Take 1 tablet by mouth every Mon, Wed, Fri.  Replaces:  sulfamethoxazole-trimethoprim 800-160mg 800-160 mg Tab        CHANGE how you take these medications    insulin degludec 200 unit/mL (3 mL) Inpn  Commonly known as:  TRESIBA FLEXTOUCH U-200  Inject 13 Units into the skin every evening.  What changed:  how much to take     insulin lispro 100 unit/mL pen  Commonly known as:  HumaLOG KwikPen Insulin  Inject 7 Units into the skin 3 (three) times daily before meals.  What changed:    · how much to take  · additional instructions     magnesium oxide 400 mg (241.3 mg magnesium) tablet  Commonly known as:  MAG-OX  Take 1 tablet (400 mg total) by mouth 2 (two) times daily as needed.  What changed:    · how much to take  · when to take this  · reasons to take this     tacrolimus 1 MG Cap  Commonly known as:  PROGRAF  Take 1 capsule (1 mg total) by mouth every 12 (twelve) hours.  What changed:    · medication strength  · how much to take        CONTINUE taking these medications    amLODIPine 5 MG tablet  Commonly known as:  NORVASC  Take 1 tablet (5 mg total) by mouth once daily.     CALCIUM 600 + D(3) 600 mg(1,500mg) -200 unit Tab  Generic drug:  calcium-vitamin D3  TAKE 1 TABLET BY MOUTH TWICE DAILY     ECOTRIN LOW STRENGTH 81 MG EC tablet  Generic drug:  aspirin  TAKE 1 TABLET DAILY     famotidine 20 MG tablet  Commonly known as:  PEPCID  TAKE 1 TABLET TWICE A DAY     fluticasone 50 mcg/actuation nasal spray  Commonly known as:  FLONASE  1 spray by Each Nare route once daily.     folic acid 1 MG tablet  Commonly known as:  FOLVITE  TAKE 1 TABLET DAILY     lancets Misc  To check BG 4 times daily, to use with insurance preferred meter     ONETOUCH VERIO Strp  Generic drug:  blood sugar diagnostic  USE TO CHECK BLOOD GLUCOSE FOUR TIMES A DAY, TO USE WITH INSURANCE PREFERRED METER     pen needle,  "diabetic 32 gauge x 5/32" Ndle  Commonly known as:  BD ULTRA-FINE JIM PEN NEEDLE  Uses 4 times daily, on multiple daily insulin injections     pravastatin 20 MG tablet  Commonly known as:  PRAVACHOL  TAKE 1 TABLET BY MOUTH ONCE DAILY     predniSONE 5 MG tablet  Commonly known as:  DELTASONE  Take 1 tablet (5 mg total) by mouth once daily.        STOP taking these medications    ACCU-CHEK DEANNE PLUS METER Misc  Generic drug:  blood-glucose meter     foscarnet in dextrose 5 % infusion     furosemide 40 MG tablet  Commonly known as:  LASIX     metFORMIN 500 MG 24 hr tablet  Commonly known as:  GLUCOPHAGE-XR     multivitamin per tablet  Commonly known as:  THERAGRAN     potassium chloride 10 MEQ Tbsr  Commonly known as:  KLOR-CON     potassium chloride SA 20 MEQ tablet  Commonly known as:  K-DUR,KLOR-CON     sulfamethoxazole-trimethoprim 800-160mg 800-160 mg Tab  Commonly known as:  BACTRIM DS  Replaced by:  sulfamethoxazole-trimethoprim 400-80mg 400-80 mg per tablet        \  Patient Instructions:      Ambulatory Referral to Infusion Dept   Referral Priority: Routine Referral Type: Consultation   Referral Reason: Specialty Services Required   Requested Specialty: IV Infusion   Number of Visits Requested: 1     Diet Adult Regular     Notify your health care provider if you experience any of the following:  increased confusion or weakness     Notify your health care provider if you experience any of the following:  persistent dizziness, light-headedness, or visual disturbances     Notify your health care provider if you experience any of the following:  worsening rash     Notify your health care provider if you experience any of the following:  severe persistent headache     Notify your health care provider if you experience any of the following:  difficulty breathing or increased cough     Notify your health care provider if you experience any of the following:  redness, tenderness, or signs of infection (pain, " swelling, redness, odor or green/yellow discharge around incision site)     Notify your health care provider if you experience any of the following:  severe uncontrolled pain     Notify your health care provider if you experience any of the following:  persistent nausea and vomiting or diarrhea     Notify your health care provider if you experience any of the following:  temperature >100.4     Activity as tolerated     Follow Up:  Follow-up Information     Darrick Wolfe MD.    Specialty:  Transplant  Contact information:  [address] [place] [id number]  [phone]                   Irwin Celestin NP  Lung Transplant  [facility]

## 2019-03-15 ENCOUNTER — TELEPHONE (OUTPATIENT)
Dept: TRANSPLANT | Facility: CLINIC | Age: 59
End: 2019-03-15

## 2019-03-15 NOTE — TELEPHONE ENCOUNTER
----- Message from Lynette Mauro sent at 3/15/2019 10:58 AM CDT -----  Pharmacy Calling    Reason for call: IV infusion    Pharmacy Name: [facility]    Prescription Name: same    Phone Number: [phone]    Additional Information: call w/instructions on how to proceed

## 2019-03-15 NOTE — TELEPHONE ENCOUNTER
Returned call to Anum from [facility] and informed her that patient can stop Rocephin treatment as scheduled on 3/18/19 as ordered by Dr. Wolfe.  Anum verbalized understanding and did not have any further questions regarding this treatment.

## 2019-03-18 ENCOUNTER — PATIENT MESSAGE (OUTPATIENT)
Dept: TRANSPLANT | Facility: CLINIC | Age: 59
End: 2019-03-18

## 2019-03-18 ENCOUNTER — LAB VISIT (OUTPATIENT)
Dept: LAB | Facility: HOSPITAL | Age: 59
End: 2019-03-18
Attending: INTERNAL MEDICINE
Payer: COMMERCIAL

## 2019-03-18 DIAGNOSIS — Z94.2 LUNG TRANSPLANTED: ICD-10-CM

## 2019-03-18 LAB
ALBUMIN SERPL BCP-MCNC: 3.4 G/DL
ALP SERPL-CCNC: 70 U/L
ALT SERPL W/O P-5'-P-CCNC: 30 U/L
ANION GAP SERPL CALC-SCNC: 8 MMOL/L
AST SERPL-CCNC: 21 U/L
BASOPHILS # BLD AUTO: 0.1 K/UL
BASOPHILS NFR BLD: 2.1 %
BILIRUB SERPL-MCNC: 0.4 MG/DL
BUN SERPL-MCNC: 25 MG/DL
CALCIUM SERPL-MCNC: 9.8 MG/DL
CHLORIDE SERPL-SCNC: 101 MMOL/L
CO2 SERPL-SCNC: 32 MMOL/L
CREAT SERPL-MCNC: 1.9 MG/DL
DIFFERENTIAL METHOD: ABNORMAL
EOSINOPHIL # BLD AUTO: 0.2 K/UL
EOSINOPHIL NFR BLD: 4.5 %
ERYTHROCYTE [DISTWIDTH] IN BLOOD BY AUTOMATED COUNT: 15 %
EST. GFR  (AFRICAN AMERICAN): 44.6 ML/MIN/1.73 M^2
EST. GFR  (NON AFRICAN AMERICAN): 38.6 ML/MIN/1.73 M^2
GLUCOSE SERPL-MCNC: 169 MG/DL
HCT VFR BLD AUTO: 31.4 %
HGB BLD-MCNC: 9.8 G/DL
IMM GRANULOCYTES # BLD AUTO: 0.03 K/UL
IMM GRANULOCYTES NFR BLD AUTO: 0.6 %
LYMPHOCYTES # BLD AUTO: 0.8 K/UL
LYMPHOCYTES NFR BLD: 16.9 %
MCH RBC QN AUTO: 30.1 PG
MCHC RBC AUTO-ENTMCNC: 31.2 G/DL
MCV RBC AUTO: 96 FL
MONOCYTES # BLD AUTO: 1 K/UL
MONOCYTES NFR BLD: 20 %
NEUTROPHILS # BLD AUTO: 2.7 K/UL
NEUTROPHILS NFR BLD: 55.9 %
NRBC BLD-RTO: 0 /100 WBC
PLATELET # BLD AUTO: 387 K/UL
PMV BLD AUTO: 8.9 FL
POTASSIUM SERPL-SCNC: 4.5 MMOL/L
PROT SERPL-MCNC: 7.4 G/DL
RBC # BLD AUTO: 3.26 M/UL
SODIUM SERPL-SCNC: 141 MMOL/L
WBC # BLD AUTO: 4.85 K/UL

## 2019-03-18 PROCEDURE — 85025 COMPLETE CBC W/AUTO DIFF WBC: CPT

## 2019-03-18 PROCEDURE — 80169 DRUG ASSAY EVEROLIMUS: CPT

## 2019-03-18 PROCEDURE — 80053 COMPREHEN METABOLIC PANEL: CPT

## 2019-03-18 PROCEDURE — 36415 COLL VENOUS BLD VENIPUNCTURE: CPT | Mod: PO

## 2019-03-18 PROCEDURE — 80197 ASSAY OF TACROLIMUS: CPT

## 2019-03-19 ENCOUNTER — TELEPHONE (OUTPATIENT)
Dept: PHARMACY | Facility: CLINIC | Age: 59
End: 2019-03-19

## 2019-03-19 LAB
CMV DNA SERPL NAA+PROBE-ACNC: 522 IU/ML
EVEROLIMUS BLD-MCNC: 5.9 NG/ML
TACROLIMUS BLD-MCNC: 6 NG/ML

## 2019-03-21 ENCOUNTER — PATIENT MESSAGE (OUTPATIENT)
Dept: TRANSPLANT | Facility: CLINIC | Age: 59
End: 2019-03-21

## 2019-03-21 DIAGNOSIS — Z94.2 LUNG REPLACED BY TRANSPLANT: Primary | ICD-10-CM

## 2019-03-21 NOTE — TELEPHONE ENCOUNTER
Received written orders from Dr. Wolef to adjust everolimus and tacrolimus to keep combined levels between 6-8.  Received verbal recommendations from Thony Lyles PharmD and approved by Dr. Wolfe instructing patient to decrease Prograf to 0.5 mg bid (from 1 mg bid) and keep the morning dose of everolimus at 1.5 mg and decrease evening dose to 0.75 mg.  [facility] message sent to patient instructing him to make the above dose changes.  Patient will be seen in clinic tomorrow on 3/22/19.  Will get with patient regarding repeat labs when he comes for clinic.  Asked patient to contact us if he had any questions.

## 2019-03-21 NOTE — TELEPHONE ENCOUNTER
----- Message from Darrick Wolfe MD sent at 3/20/2019  3:04 PM CDT -----  Please adjust to keep the combined level between 6-8

## 2019-03-22 ENCOUNTER — CLINICAL SUPPORT (OUTPATIENT)
Dept: INFECTIOUS DISEASES | Facility: CLINIC | Age: 59
End: 2019-03-22
Payer: COMMERCIAL

## 2019-03-22 ENCOUNTER — HOSPITAL ENCOUNTER (OUTPATIENT)
Dept: PULMONOLOGY | Facility: CLINIC | Age: 59
Discharge: HOME | End: 2019-03-22
Payer: COMMERCIAL

## 2019-03-22 ENCOUNTER — LAB VISIT (OUTPATIENT)
Dept: LAB | Facility: HOSPITAL | Age: 59
End: 2019-03-22
Payer: COMMERCIAL

## 2019-03-22 ENCOUNTER — OFFICE VISIT (OUTPATIENT)
Dept: TRANSPLANT | Facility: CLINIC | Age: 59
End: 2019-03-22
Payer: COMMERCIAL

## 2019-03-22 ENCOUNTER — TELEPHONE (OUTPATIENT)
Dept: TRANSPLANT | Facility: CLINIC | Age: 59
End: 2019-03-22

## 2019-03-22 ENCOUNTER — OFFICE VISIT (OUTPATIENT)
Dept: INFECTIOUS DISEASES | Facility: CLINIC | Age: 59
End: 2019-03-22
Payer: COMMERCIAL

## 2019-03-22 ENCOUNTER — HOSPITAL ENCOUNTER (OUTPATIENT)
Dept: RADIOLOGY | Facility: HOSPITAL | Age: 59
Discharge: HOME | End: 2019-03-22
Attending: INTERNAL MEDICINE
Payer: COMMERCIAL

## 2019-03-22 VITALS
BODY MASS INDEX: 31.2 KG/M2 | SYSTOLIC BLOOD PRESSURE: 139 MMHG | TEMPERATURE: 99 F | HEART RATE: 96 BPM | HEIGHT: 71 IN | DIASTOLIC BLOOD PRESSURE: 89 MMHG | WEIGHT: 222.88 LBS

## 2019-03-22 VITALS
HEIGHT: 70 IN | DIASTOLIC BLOOD PRESSURE: 93 MMHG | HEART RATE: 101 BPM | OXYGEN SATURATION: 98 % | SYSTOLIC BLOOD PRESSURE: 136 MMHG | RESPIRATION RATE: 18 BRPM | WEIGHT: 220 LBS | BODY MASS INDEX: 31.5 KG/M2 | TEMPERATURE: 98 F

## 2019-03-22 DIAGNOSIS — Z48.298 ENCOUNTER FOLLOWING ORGAN TRANSPLANT: Primary | ICD-10-CM

## 2019-03-22 DIAGNOSIS — Z79.4 TYPE 2 DIABETES MELLITUS WITH HYPERGLYCEMIA, WITH LONG-TERM CURRENT USE OF INSULIN: Chronic | ICD-10-CM

## 2019-03-22 DIAGNOSIS — Z94.2 LUNG TRANSPLANTED: ICD-10-CM

## 2019-03-22 DIAGNOSIS — B25.8 OTHER CYTOMEGALOVIRAL DISEASES: Primary | ICD-10-CM

## 2019-03-22 DIAGNOSIS — L03.113 CELLULITIS OF RIGHT UPPER EXTREMITY: ICD-10-CM

## 2019-03-22 DIAGNOSIS — E11.65 TYPE 2 DIABETES MELLITUS WITH HYPERGLYCEMIA, WITH LONG-TERM CURRENT USE OF INSULIN: Chronic | ICD-10-CM

## 2019-03-22 DIAGNOSIS — Z94.2 LUNG REPLACED BY TRANSPLANT: ICD-10-CM

## 2019-03-22 DIAGNOSIS — E83.42 HYPOMAGNESEMIA: ICD-10-CM

## 2019-03-22 DIAGNOSIS — B25.9 CYTOMEGALOVIRUS INFECTION, UNSPECIFIED CYTOMEGALOVIRAL INFECTION TYPE: ICD-10-CM

## 2019-03-22 DIAGNOSIS — Z00.6 RESEARCH STUDY PATIENT: ICD-10-CM

## 2019-03-22 DIAGNOSIS — D84.9 IMMUNOSUPPRESSION: ICD-10-CM

## 2019-03-22 DIAGNOSIS — Z79.2 PROPHYLACTIC ANTIBIOTIC: ICD-10-CM

## 2019-03-22 DIAGNOSIS — I82.621 ACUTE DEEP VEIN THROMBOSIS (DVT) OF RIGHT UPPER EXTREMITY, UNSPECIFIED VEIN: ICD-10-CM

## 2019-03-22 DIAGNOSIS — B25.9 CYTOMEGALOVIRUS (CMV) VIREMIA: ICD-10-CM

## 2019-03-22 DIAGNOSIS — Z00.6 RESEARCH SUBJECT: ICD-10-CM

## 2019-03-22 LAB
ALBUMIN SERPL BCP-MCNC: 3.5 G/DL
ALP SERPL-CCNC: 84 U/L
ALT SERPL W/O P-5'-P-CCNC: 26 U/L
ANION GAP SERPL CALC-SCNC: 8 MMOL/L
AST SERPL-CCNC: 21 U/L
BASOPHILS # BLD AUTO: 0.1 K/UL
BASOPHILS NFR BLD: 1.6 %
BILIRUB SERPL-MCNC: 0.3 MG/DL
BUN SERPL-MCNC: 25 MG/DL
CALCIUM SERPL-MCNC: 9 MG/DL
CHLORIDE SERPL-SCNC: 102 MMOL/L
CO2 SERPL-SCNC: 27 MMOL/L
CREAT SERPL-MCNC: 1.7 MG/DL
DIFFERENTIAL METHOD: ABNORMAL
EOSINOPHIL # BLD AUTO: 0.2 K/UL
EOSINOPHIL NFR BLD: 3.6 %
ERYTHROCYTE [DISTWIDTH] IN BLOOD BY AUTOMATED COUNT: 14.5 %
EST. GFR  (AFRICAN AMERICAN): 51 ML/MIN/1.73 M^2
EST. GFR  (NON AFRICAN AMERICAN): 44.1 ML/MIN/1.73 M^2
GLUCOSE SERPL-MCNC: 195 MG/DL
HCT VFR BLD AUTO: 32.2 %
HGB BLD-MCNC: 10 G/DL
IMM GRANULOCYTES # BLD AUTO: 0.05 K/UL
IMM GRANULOCYTES NFR BLD AUTO: 0.8 %
LYMPHOCYTES # BLD AUTO: 0.9 K/UL
LYMPHOCYTES NFR BLD: 14.4 %
MAGNESIUM SERPL-MCNC: 1.3 MG/DL
MCH RBC QN AUTO: 29.7 PG
MCHC RBC AUTO-ENTMCNC: 31.1 G/DL
MCV RBC AUTO: 96 FL
MONOCYTES # BLD AUTO: 1.2 K/UL
MONOCYTES NFR BLD: 18.1 %
NEUTROPHILS # BLD AUTO: 3.9 K/UL
NEUTROPHILS NFR BLD: 61.5 %
NRBC BLD-RTO: 0 /100 WBC
PLATELET # BLD AUTO: 319 K/UL
PMV BLD AUTO: 8.9 FL
POTASSIUM SERPL-SCNC: 4.3 MMOL/L
PRE FEV1 FVC: 80
PRE FEV1: 2.56
PRE FVC: 3.21
PREDICTED FEV1 FVC: 80
PREDICTED FEV1: 3.68
PREDICTED FVC: 4.54
PROT SERPL-MCNC: 7.5 G/DL
RBC # BLD AUTO: 3.37 M/UL
SODIUM SERPL-SCNC: 137 MMOL/L
TACROLIMUS BLD-MCNC: 6 NG/ML
WBC # BLD AUTO: 6.37 K/UL

## 2019-03-22 PROCEDURE — 99999 PR PBB SHADOW E&M-EST. PATIENT-LVL III: CPT | Mod: PBBFAC,,, | Performed by: INTERNAL MEDICINE

## 2019-03-22 PROCEDURE — 83735 ASSAY OF MAGNESIUM: CPT

## 2019-03-22 PROCEDURE — 99999 PR PBB SHADOW E&M-EST. PATIENT-LVL III: ICD-10-PCS | Mod: PBBFAC,,, | Performed by: INTERNAL MEDICINE

## 2019-03-22 PROCEDURE — 99499 UNLISTED E&M SERVICE: CPT | Mod: S$GLB,,, | Performed by: CLINICAL NURSE SPECIALIST

## 2019-03-22 PROCEDURE — 80169 DRUG ASSAY EVEROLIMUS: CPT

## 2019-03-22 PROCEDURE — 71046 X-RAY EXAM CHEST 2 VIEWS: CPT | Mod: 26,,, | Performed by: RADIOLOGY

## 2019-03-22 PROCEDURE — 80053 COMPREHEN METABOLIC PANEL: CPT

## 2019-03-22 PROCEDURE — 99999 PR PBB SHADOW E&M-EST. PATIENT-LVL III: CPT | Mod: PBBFAC,,, | Performed by: PHYSICIAN ASSISTANT

## 2019-03-22 PROCEDURE — 94010 BREATHING CAPACITY TEST: CPT | Mod: S$GLB,,, | Performed by: INTERNAL MEDICINE

## 2019-03-22 PROCEDURE — 71046 XR CHEST PA AND LATERAL: ICD-10-PCS | Mod: 26,,, | Performed by: RADIOLOGY

## 2019-03-22 PROCEDURE — 94010 BREATHING CAPACITY TEST: ICD-10-PCS | Mod: S$GLB,,, | Performed by: INTERNAL MEDICINE

## 2019-03-22 PROCEDURE — 99999 PR PBB SHADOW E&M-EST. PATIENT-LVL III: ICD-10-PCS | Mod: PBBFAC,,, | Performed by: PHYSICIAN ASSISTANT

## 2019-03-22 PROCEDURE — 80197 ASSAY OF TACROLIMUS: CPT

## 2019-03-22 PROCEDURE — 99499 NO LOS: ICD-10-PCS | Mod: S$GLB,,, | Performed by: CLINICAL NURSE SPECIALIST

## 2019-03-22 PROCEDURE — 85025 COMPLETE CBC W/AUTO DIFF WBC: CPT

## 2019-03-22 PROCEDURE — 99999 PR PBB SHADOW E&M-EST. PATIENT-LVL III: ICD-10-PCS | Mod: PBBFAC,,, | Performed by: CLINICAL NURSE SPECIALIST

## 2019-03-22 PROCEDURE — 99999 PR PBB SHADOW E&M-EST. PATIENT-LVL III: CPT | Mod: PBBFAC,,, | Performed by: CLINICAL NURSE SPECIALIST

## 2019-03-22 PROCEDURE — 99214 OFFICE O/P EST MOD 30 MIN: CPT | Mod: 25,S$GLB,, | Performed by: PHYSICIAN ASSISTANT

## 2019-03-22 PROCEDURE — 71046 X-RAY EXAM CHEST 2 VIEWS: CPT | Mod: TC

## 2019-03-22 PROCEDURE — 99214 PR OFFICE/OUTPT VISIT, EST, LEVL IV, 30-39 MIN: ICD-10-PCS | Mod: 25,S$GLB,, | Performed by: PHYSICIAN ASSISTANT

## 2019-03-22 RX ORDER — TACROLIMUS 0.5 MG/1
0.5 CAPSULE ORAL EVERY 12 HOURS
Qty: 60 CAPSULE | Refills: 11 | Status: SHIPPED | OUTPATIENT
Start: 2019-03-22 | End: 2019-04-01

## 2019-03-22 RX ORDER — CEFADROXIL 500 MG/1
500 CAPSULE ORAL EVERY 12 HOURS
Qty: 28 CAPSULE | Refills: 0 | Status: SHIPPED | OUTPATIENT
Start: 2019-03-22 | End: 2019-04-05

## 2019-03-22 RX ORDER — EVEROLIMUS 0.75 MG/1
TABLET ORAL
Qty: 90 TABLET | Refills: 11 | Status: SHIPPED | OUTPATIENT
Start: 2019-03-22 | End: 2019-04-08

## 2019-03-22 NOTE — SEDATION DOCUMENTATION
Specimens obtained during Bronchoscopy:  BAL RML 90 ml nacl in 40 ml return also send for Galactomannan, TBBX RLL, [place] micro L main.  Verbal report given to  to include documentation charted in procedural sedation documentation.  Patient to be NPO 1 hour post procedure and place in PO tolerance at 1420, CXR needed at bedside.  Moderate concious sedation was performed and cardiorespiratory functions were monitored the entire procedure by Ranjana Kimbrough RN.  Sedation began at 1309  and concluded at 1345.  Ranjana Kimbrough RN      36.8

## 2019-03-22 NOTE — TELEPHONE ENCOUNTER
----- Message from Emma Finley sent at 3/22/2019  2:13 PM CDT -----  Contact: [facility]  Needs Advice    Reason for call: IV orders        Communication Preference: [phone] Anum    Additional Information:  n/a

## 2019-03-22 NOTE — PROGRESS NOTES
LUNG TRANSPLANT RECIPIENT FOLLOW-UP    Reason for Visit: Follow-up after lung transplantation.                                                                                                         Date of Transplant: 8/22/17     Reason for Transplant: Sarcoidosis with pulmonary hypertension     Type of Transplant: bilateral sequential lung     CMV Status: D+ / R-      Major Complications:   1. Left vocal cord dysfunction   2. 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 is a 58 y.o. year old male with the above listed transplant history who presents today for hospital discharge follow up. Patient was recently admitted from 3/3 through 3/14/2019 secondary to right arm cellulitis and AVILA with severe electrolyte imbalances. Patient's RUE PICC was removed in the ED due to concern for site being infected. Patient completed treatment of cellulitis with total 14 day course of IV Rocephin as outpatient. During admission, patient was found to have RUE and LLE DVT and was started on apixaban for treatment. Patient also had right tunneled catheter placed without complications. Since discharge, patient reports overall feeling greatly improved. Patient continues to report chronic, dry cough that intermittently occurs throughout the day. Denies any chest pain, SOB. Patient notes interval improvement in RUE edema. Denies any further fevers, chills, myalgias, and congestion. Patient reports that his diarrhea has resolved. No other complaints of heartburn, abdominal pain, nausea, or vomiting.     Review of Systems   Constitutional: Negative for chills, diaphoresis, fever, malaise/fatigue and weight loss.   HENT: Negative for congestion, ear discharge, ear pain,  "hearing loss, nosebleeds, sinus pain, sore throat and tinnitus.    Eyes: Negative for blurred vision, double vision, photophobia, pain, discharge and redness.   Respiratory: Positive for cough (dry cough; baseline). Negative for hemoptysis, sputum production, shortness of breath, wheezing and stridor.    Cardiovascular: Positive for leg swelling. Negative for chest pain, palpitations, orthopnea, claudication and PND.   Gastrointestinal: Negative for abdominal pain, blood in stool, constipation, diarrhea, heartburn, melena, nausea and vomiting.   Genitourinary: Negative for dysuria, flank pain, frequency, hematuria and urgency.   Musculoskeletal: Negative for back pain, falls, joint pain, myalgias and neck pain.   Skin: Negative for itching and rash.   Neurological: Negative for dizziness, tingling, tremors, sensory change, speech change, focal weakness, seizures, loss of consciousness, weakness and headaches.   Endo/Heme/Allergies: Negative for environmental allergies and polydipsia. Does not bruise/bleed easily.   Psychiatric/Behavioral: Negative for depression, hallucinations, memory loss, substance abuse and suicidal ideas. The patient is not nervous/anxious and does not have insomnia.      BP (!) 136/93 (BP Location: Left arm)   Pulse 101   Temp 97.6 °F (36.4 °C)   Resp 18   Ht 5' 10" (1.778 m)   Wt 99.8 kg (220 lb)   SpO2 98%   BMI 31.57 kg/m²     Physical Exam   Constitutional: He is oriented to person, place, and time and well-developed, well-nourished, and in no distress. No distress.   HENT:   Head: Normocephalic and atraumatic.   Nose: Nose normal.   Eyes: Conjunctivae and EOM are normal. No scleral icterus.   Neck: Normal range of motion. Neck supple. No JVD present. No tracheal deviation present.   Cardiovascular: Regular rhythm, normal heart sounds and intact distal pulses. Tachycardia present. Exam reveals no gallop and no friction rub.   No murmur heard.  Pulmonary/Chest: Effort normal and " breath sounds normal. No stridor. No respiratory distress. He has no wheezes. He has no rales. He exhibits no tenderness.   Right tunneled cath; ecchymosis surrounding site. No drainage or warmth at site.    Abdominal: Soft. Bowel sounds are normal. He exhibits no distension. There is no tenderness.   Musculoskeletal: Normal range of motion. He exhibits edema (RUE edema; minimal BLE edema to ankle). He exhibits no tenderness or deformity.   Neurological: He is alert and oriented to person, place, and time. Gait normal.   Skin: Skin is warm and dry. No rash noted. He is not diaphoretic. No erythema. No pallor.   Psychiatric: Mood, memory, affect and judgment normal.   Nursing note and vitals reviewed.    Labs:  cbc, cmp, tacrolimus Latest Ref Rng & Units 3/14/2019 3/18/2019 3/22/2019   TACROLIMUS LVL 5.0 - 15.0 ng/mL 3.8(L) 6.0 -   WHITE BLOOD CELL COUNT 3.90 - 12.70 K/uL 8.67 4.85 6.37   RBC 4.60 - 6.20 M/uL 3.50(L) 3.26(L) 3.37(L)   HEMOGLOBIN 14.0 - 18.0 g/dL 10.4(L) 9.8(L) 10.0(L)   HEMATOCRIT 40.0 - 54.0 % 33.0(L) 31.4(L) 32.2(L)   MCV 82 - 98 fL 94 96 96   MCH 27.0 - 31.0 pg 29.7 30.1 29.7   MCHC 32.0 - 36.0 g/dL 31.5(L) 31.2(L) 31.1(L)   RDW 11.5 - 14.5 % 15.3(H) 15.0(H) 14.5   PLATELETS 150 - 350 K/uL 411(H) 387(H) 319   MPV 9.2 - 12.9 fL 8.4(L) 8.9(L) 8.9(L)   GRAN # 1.8 - 7.7 K/uL 5.6 2.7 3.9   LYMPH # 1.0 - 4.8 K/uL 1.3 0.8(L) 0.9(L)   MONO # 0.3 - 1.0 K/uL 1.3(H) 1.0 1.2(H)   EOSINOPHIL% 0.0 - 8.0 % 3.0 4.5 3.6   BASOPHIL% 0.0 - 1.9 % 1.5 2.1(H) 1.6   DIFFERENTIAL METHOD - Automated Automated Automated   SODIUM 136 - 145 mmol/L 141 141 137   POTASSIUM 3.5 - 5.1 mmol/L 4.3 4.5 4.3   CHLORIDE 95 - 110 mmol/L 101 101 102   CO2 23 - 29 mmol/L 31(H) 32(H) 27   GLUCOSE 70 - 110 mg/dL 107 169(H) 195(H)   BUN BLD 6 - 20 mg/dL 22(H) 25(H) 25(H)   CREATININE 0.5 - 1.4 mg/dL 1.9(H) 1.9(H) 1.7(H)   CALCIUM 8.7 - 10.5 mg/dL 10.3 9.8 9.0   PROTEIN TOTAL 6.0 - 8.4 g/dL 7.5 7.4 7.5   ALBUMIN 3.5 - 5.2 g/dL 3.4(L) 3.4(L)  3.5   BILIRUBIN TOTAL 0.1 - 1.0 mg/dL 0.3 0.4 0.3   ALK PHOS 55 - 135 U/L 75 70 84   AST 10 - 40 U/L 32 21 21   ALT 10 - 44 U/L 36 30 26   ANION GAP 8 - 16 mmol/L 9 8 8   EGFR IF AFRICAN AMERICAN >60 mL/min/1.73 m:2 44.6(A) 44.6(A) 51.0(A)   EGFR IF NON-AFRICAN AMERICAN >60 mL/min/1.73 m:2 38.6(A) 38.6(A) 44.1(A)     Pulmonary Function Tests 3/22/2019 2/14/2019 12/4/2018 11/19/2018 10/17/2018 9/17/2018 8/15/2018   FVC 3.21 3.09 3.1 3.02 3.06 3.02 3.15   FEV1 2.56 2.44 2.5 2.45 2.52 2.52 2.53   TLC (liters) - - - - - - -   DLCO (ml/mmHg sec) - - - - - - -   FVC% 70 68 68 61.3 62.1 61.4 64   FEV1% 70 66 68 64.9 66.8 66.7 67   FEF 25-75 2.58 2.39 2.32 2.47 2.55 2.57 2.41   FEF 25-75% 69 64 62 76.5 79 79.7 74.9   TLC% - - - - - - -   RV - - - - - - -   RV% - - - - - - -   DLCO% - - - - - - -       Imaging:  Results for orders placed during the hospital encounter of 03/22/19   X-Ray Chest PA And Lateral    Narrative EXAMINATION:  XR CHEST PA AND LATERAL    CLINICAL HISTORY:  s/p bilateral lung transplant August 2017;    TECHNIQUE:  Two views of the chest were obtained, with PA and lateral projections submitted.    COMPARISON:  Comparison is made to 03/04/2019.    FINDINGS:  Postoperative changes are again noted in the thorax.  Right jugular origin vascular catheter is seen, its tip near the junction of the superior vena cava and right atrium.  Heart size is normal, as is the appearance of the pulmonary vascularity.  Linear opacities consistent with minor subsegmental atelectasis in the left lower lung lobe are observed, with the remainder of the left lung and the right lung appearing clear and free of significant airspace consolidation or volume loss.  A small amount of pleural fluid is seen on the left side, with no pleural fluid on the right.  No pneumothorax.  Right upper quadrant surgical clips are incidentally observed.      Impression Minimal residual basilar subsegmental atelectasis and a small amount of pleural  fluid are seen on the left side.  No significant detrimental interval change in the appearance of the chest since 03/04/2019      Electronically signed by: Sukhi Good MD  Date:    03/22/2019  Time:    08:06       Assessment:  1. Encounter following organ transplant    2. Lung replaced by transplant    3. Immunosuppression    4. Cytomegalovirus (CMV) viremia    5. Prophylactic antibiotic    6. Cellulitis of right upper extremity    7. Acute deep vein thrombosis (DVT) of right upper extremity, unspecified vein      Plan:   1. Labs reviewed. Cr stable at 1.7. K level stable. Mg slightly decreased to 1.3. Will continue current mag supplementation at this time. Continue immunosuppression and prophylaxis.     2. CXR reviewed and is stable with no acute process. Baseline FEV1 value calculated at 2.76. FEV1 today improved from previous visit and is currently 90-95% of baseline. No concerns for graft dysfunction at this time. Continue immunosuppression and prophylaxis.     3.  Continue prednisone 5 mg daily. Continue everolimus for anti-CMV properties. Continue tacrolimus. Everolimus and tacrolimus levels today pending, will follow up for dose adjustments if needed.     4. Dr. Lowry to discuss case with Dr. Wolfe for treatment options moving forward now that patient's Cr and electrolyte imbalances have stabilized. Most recent CMV PCR elevated to 522 on 3/18. Patient previously on maribavir for clinical trial; however, was later started on Foscarnet infusions BID.     5. Continue Bactrim SS every MWF for PCP prophylaxis.     6. Per ID Dr. Lowry, will plan for 14 day course of cefadroxil.     7. Continue apixaban 2.5 mg BID, which was started during hospital admission on 3/6/2019. Will plan for total treatment period of 6-12 weeks.     8.  Follow up in 3 months or earlier if needed.     Patricia Fowler PA-C  Lung Transplant

## 2019-03-22 NOTE — PROGRESS NOTES
Roman Refractory or Resistant CMV Treatment Study  Protocol: [id number]  IRB# [id number]  PI: Jaron ALY)  Site: 002     VISIT 15/ WEEK 14       Date of Visit: 03/22/2019     Mr. Martin Hendrix presents to clinic for Visit 15/Week 14 for Roman CMV treatment study. Patient is now in follow up phase of study. Received Maribavir 400mg po BID for 8 weeks as per study protocol that completed on 2/6/19.     CMV viral load had been less than <35 on 1/18/19, but then began increasing again 2/1/19. Patient was started on treatment with Foscarnet on 2/12/19. Foscarnet discontinued when patient admitted with AVILA on 3/3/19. Last dose 3/2/19. Discharged from hospital 3/14/19. ID, Dr. Lowry, following patient for CMV, maintains off Foscarnet.     No Adverse events assessed, no episodes of rejection assessed, no CMV invasive disease or CMV syndrome assessed. Denies fever, chills.      Plan:  1. Study labs drawn and processed per protocol.  2. Next research study visit with labs at visit on 04/05/2019.   3. Call clinic with any questions/concerns.

## 2019-03-22 NOTE — TELEPHONE ENCOUNTER
Returned call to Makayla and spoke to Linda.  Informed Linda that patient will keep the central line in for now and to continue maintenance and dressing changes weekly for one month.  We will renew orders if needed when that time comes.  Linda verbalized understanding and did not have any further questions at this time.

## 2019-03-22 NOTE — PROGRESS NOTES
Subjective:      Patient ID: Martin Hendrix Jr. is a 58 y.o. male.    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       Patient was admitted 2/12/2019-2/15/2019 for initiation of foscarnet therapy. 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  Patient also noted to have new right arm swelling, tenderness, redness - PICC line removed, noted to have some purulence at site.  US with R partial axillary and subclavian thrombus, started on apixaban.  Patient received CVC line, given empiric course of vanc / ceftriaxone.      Patient denied having any fevers, chills, n/v/d, abdominal pain, cough, SOB, CP.  Notes his right arm is still red and swollen, tenderness has decreased compared to prior, but some tenderness remains.          Review of Systems   Constitution: Negative for chills, fever and night sweats.   HENT: Negative for congestion and sore throat.    Cardiovascular: Negative for chest pain, dyspnea on exertion and leg swelling.   Respiratory: Negative for cough, hemoptysis and shortness of breath.    Skin: Negative for rash and suspicious lesions.        Right arm redness, swelling   Musculoskeletal: Negative for joint pain and joint swelling.   Gastrointestinal: Negative for abdominal pain, diarrhea, nausea and vomiting.   Genitourinary: Negative for dysuria and hematuria.   Neurological: Negative for headaches and numbness.   Psychiatric/Behavioral: Negative for depression. The patient is not nervous/anxious.      Objective:   Physical Exam    Constitutional: He is oriented to person, place, and time. He appears well-developed and well-nourished. No distress.   HENT:   Head: Normocephalic and atraumatic.   Eyes: Conjunctivae and EOM are normal.   Neck: Normal range of motion. Neck supple.   Cardiovascular:   Right chest indwelling catheter with no surrounding redness, swelling, pain   Pulmonary/Chest: Effort normal. No respiratory distress.   Abdominal: Soft. He exhibits no distension.   Musculoskeletal: Normal range of motion. He exhibits no edema.   Neurological: He is alert and oriented to person, place, and time.   Skin: Skin is warm and dry. No rash noted. He is not diaphoretic. No erythema.   Right arm redness, swelling   Psychiatric: He has a normal mood and affect. His behavior is normal.   Vitals reviewed.    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  Treatment with FOS c/b AVILA, patient started on pre-emptive monitoring for management.  Patient also developed R sublcavian/axillary vein thrombus from PICC line, treated with apixaban and IV ceftriaxone/vancomycin.    Patient denies any systemic symptoms to suggest CMV disease.     Patient with persistently swollen right arm, possibly related to ongoing thrombus, but concern for persistent cellulitis.    Plan:   - Start cefadroxil 500 mg PO BID x14 days for coverage of right arm cellulitis  - Continue to monitor CMV VL - will start retreatment with foscarnet if patient becomes symptomtatic, plan for threshold of [id number] for retreatment of CMV infection  - Follow-up with lung transplant    Discussed with   Yaneth Lowry MD MPH  Infectious [facility]

## 2019-03-24 LAB — EVEROLIMUS BLD-MCNC: 6.3 NG/ML

## 2019-03-25 DIAGNOSIS — E11.65 TYPE 2 DIABETES MELLITUS WITH HYPERGLYCEMIA, WITH LONG-TERM CURRENT USE OF INSULIN: Chronic | ICD-10-CM

## 2019-03-25 DIAGNOSIS — Z79.4 TYPE 2 DIABETES MELLITUS WITH HYPERGLYCEMIA, WITH LONG-TERM CURRENT USE OF INSULIN: Chronic | ICD-10-CM

## 2019-03-25 RX ORDER — PEN NEEDLE, DIABETIC 30 GX3/16"
NEEDLE, DISPOSABLE MISCELLANEOUS
Qty: 350 EACH | Refills: 3 | Status: SHIPPED | OUTPATIENT
Start: 2019-03-25

## 2019-03-29 ENCOUNTER — LAB VISIT (OUTPATIENT)
Dept: LAB | Facility: HOSPITAL | Age: 59
End: 2019-03-29
Attending: INTERNAL MEDICINE
Payer: COMMERCIAL

## 2019-03-29 DIAGNOSIS — Z94.2 LUNG TRANSPLANTED: ICD-10-CM

## 2019-03-29 DIAGNOSIS — Z94.2 LUNG REPLACED BY TRANSPLANT: ICD-10-CM

## 2019-03-29 LAB
ANION GAP SERPL CALC-SCNC: 10 MMOL/L (ref 8–16)
BUN SERPL-MCNC: 24 MG/DL (ref 6–20)
CALCIUM SERPL-MCNC: 10.5 MG/DL (ref 8.7–10.5)
CHLORIDE SERPL-SCNC: 100 MMOL/L (ref 95–110)
CO2 SERPL-SCNC: 29 MMOL/L (ref 23–29)
CREAT SERPL-MCNC: 1.8 MG/DL (ref 0.5–1.4)
EST. GFR  (AFRICAN AMERICAN): 47.6 ML/MIN/1.73 M^2
EST. GFR  (NON AFRICAN AMERICAN): 41.2 ML/MIN/1.73 M^2
GLUCOSE SERPL-MCNC: 201 MG/DL (ref 70–110)
POTASSIUM SERPL-SCNC: 4.6 MMOL/L (ref 3.5–5.1)
SODIUM SERPL-SCNC: 139 MMOL/L (ref 136–145)

## 2019-03-29 PROCEDURE — 80169 DRUG ASSAY EVEROLIMUS: CPT

## 2019-03-29 PROCEDURE — 80197 ASSAY OF TACROLIMUS: CPT

## 2019-03-29 PROCEDURE — 80048 BASIC METABOLIC PNL TOTAL CA: CPT

## 2019-03-30 LAB
EVEROLIMUS BLD-MCNC: 2.9 NG/ML
TACROLIMUS BLD-MCNC: 2.2 NG/ML (ref 5–15)

## 2019-04-01 ENCOUNTER — PATIENT MESSAGE (OUTPATIENT)
Dept: TRANSPLANT | Facility: CLINIC | Age: 59
End: 2019-04-01

## 2019-04-01 DIAGNOSIS — Z94.2 LUNG REPLACED BY TRANSPLANT: ICD-10-CM

## 2019-04-01 RX ORDER — TACROLIMUS 0.5 MG/1
CAPSULE ORAL
Qty: 90 CAPSULE | Refills: 11 | Status: SHIPPED | OUTPATIENT
Start: 2019-04-01 | End: 2019-04-17

## 2019-04-01 NOTE — TELEPHONE ENCOUNTER
Received written orders from Dr. Wolfe instructing patient to increase the morning dose of Prograf to 1 mg and keep the evening dose the same at 0.5 mg.  Contacted patient and left a voicemail to make the above dose change.  Labs will be repeated on Friday 4/5/19.  [facility] message was also sent to the patient with the above change.  Asked patient to contact us if he had any questions.

## 2019-04-01 NOTE — TELEPHONE ENCOUNTER
----- Message from Darrick Wolfe MD sent at 4/1/2019  1:06 PM CDT -----  Increase AM tacro to 1 mg

## 2019-04-04 DIAGNOSIS — Z94.2 LUNG REPLACED BY TRANSPLANT: Primary | ICD-10-CM

## 2019-04-04 DIAGNOSIS — E83.42 HYPOMAGNESEMIA: ICD-10-CM

## 2019-04-05 ENCOUNTER — LAB VISIT (OUTPATIENT)
Dept: LAB | Facility: HOSPITAL | Age: 59
End: 2019-04-05
Payer: COMMERCIAL

## 2019-04-05 ENCOUNTER — OFFICE VISIT (OUTPATIENT)
Dept: INFECTIOUS DISEASES | Facility: CLINIC | Age: 59
End: 2019-04-05
Payer: COMMERCIAL

## 2019-04-05 DIAGNOSIS — B25.9 CYTOMEGALOVIRUS INFECTION, UNSPECIFIED CYTOMEGALOVIRAL INFECTION TYPE: ICD-10-CM

## 2019-04-05 DIAGNOSIS — Z94.2 LUNG TRANSPLANTED: ICD-10-CM

## 2019-04-05 DIAGNOSIS — Z94.2 LUNG REPLACED BY TRANSPLANT: ICD-10-CM

## 2019-04-05 DIAGNOSIS — Z00.6 RESEARCH STUDY PATIENT: ICD-10-CM

## 2019-04-05 DIAGNOSIS — B25.9 CYTOMEGALOVIRUS INFECTION, UNSPECIFIED CYTOMEGALOVIRAL INFECTION TYPE: Primary | ICD-10-CM

## 2019-04-05 DIAGNOSIS — Z00.6 RESEARCH SUBJECT: ICD-10-CM

## 2019-04-05 LAB
ANION GAP SERPL CALC-SCNC: 8 MMOL/L (ref 8–16)
BUN SERPL-MCNC: 23 MG/DL (ref 6–20)
CALCIUM SERPL-MCNC: 8.8 MG/DL (ref 8.7–10.5)
CHLORIDE SERPL-SCNC: 101 MMOL/L (ref 95–110)
CO2 SERPL-SCNC: 31 MMOL/L (ref 23–29)
CREAT SERPL-MCNC: 1.7 MG/DL (ref 0.5–1.4)
DRUG STUDY SPECIMEN TYPE: NORMAL
DRUG STUDY TEST NAME: NORMAL
DRUG STUDY TEST RESULT: NORMAL
EST. GFR  (AFRICAN AMERICAN): 51 ML/MIN/1.73 M^2
EST. GFR  (NON AFRICAN AMERICAN): 44.1 ML/MIN/1.73 M^2
GLUCOSE SERPL-MCNC: 172 MG/DL (ref 70–110)
POTASSIUM SERPL-SCNC: 3.5 MMOL/L (ref 3.5–5.1)
SODIUM SERPL-SCNC: 140 MMOL/L (ref 136–145)
TACROLIMUS BLD-MCNC: 3.8 NG/ML (ref 5–15)

## 2019-04-05 PROCEDURE — 99999 PR PBB SHADOW E&M-EST. PATIENT-LVL III: ICD-10-PCS | Mod: PBBFAC,,, | Performed by: CLINICAL NURSE SPECIALIST

## 2019-04-05 PROCEDURE — 80169 DRUG ASSAY EVEROLIMUS: CPT

## 2019-04-05 PROCEDURE — 36415 COLL VENOUS BLD VENIPUNCTURE: CPT

## 2019-04-05 PROCEDURE — 80048 BASIC METABOLIC PNL TOTAL CA: CPT

## 2019-04-05 PROCEDURE — 99499 UNLISTED E&M SERVICE: CPT | Mod: S$GLB,,, | Performed by: CLINICAL NURSE SPECIALIST

## 2019-04-05 PROCEDURE — 99499 NO LOS: ICD-10-PCS | Mod: S$GLB,,, | Performed by: CLINICAL NURSE SPECIALIST

## 2019-04-05 PROCEDURE — 99000 SPECIMEN HANDLING OFFICE-LAB: CPT

## 2019-04-05 PROCEDURE — 99999 PR PBB SHADOW E&M-EST. PATIENT-LVL III: CPT | Mod: PBBFAC,,, | Performed by: CLINICAL NURSE SPECIALIST

## 2019-04-05 PROCEDURE — 80197 ASSAY OF TACROLIMUS: CPT

## 2019-04-05 NOTE — PROGRESS NOTES
Roman Refractory or Resistant CMV Treatment Study  Protocol: [id number]  IRB# [id number]  PI: Jaron ALY)  Site: 002     VISIT 16/ WEEK 16       Date of Visit: 04/05/2019     Mr. Martin Hendrix presents to clinic for Visit 16/Week 16 for Roman CMV treatment study. Patient is now in follow up phase of study. Received Maribavir 400mg po BID for 8 weeks as per study protocol that completed on 2/6/19.      CMV viral load had been less than <35 on 1/18/19, but then began increasing again 2/1/19. Patient was started on treatment with Foscarnet on 2/12/19. Foscarnet discontinued when patient admitted with AVILA on 3/3/19. Last dose 3/2/19. Discharged from hospital 3/14/19.     ID, Dr. Lowry, following patient for CMV, maintains off Foscarnet. Last CMV on 3/18/. Discussed with Dr. Lowry. Will send patient for CMV today and she will monitor labs weekly. Her plan is to watching CMV viremia and if increases to >1000 will make assessment on resuming Foscarnet.      Patient states he is feeling well. Denies any new signs, symptoms. He would prefer to have right chest wall [place] removed, but understands need to keep in place at this time. No Adverse events assessed, no episodes of rejection assessed, no CMV invasive disease or CMV syndrome assessed. Denies fever, chills, n/v/d, chest pain, shortness of breath.      Plan:  1. Study labs drawn and processed per protocol.  2. Questionnaires per study protocol completed on electronic device.   2. Next research study visit with labs at visit on 04/16/2019.   3. Call clinic with any questions/concerns.

## 2019-04-07 DIAGNOSIS — B25.9 CYTOMEGALOVIRUS INFECTION, UNSPECIFIED CYTOMEGALOVIRAL INFECTION TYPE: Primary | ICD-10-CM

## 2019-04-07 LAB — EVEROLIMUS BLD-MCNC: 6.9 NG/ML

## 2019-04-08 ENCOUNTER — PATIENT MESSAGE (OUTPATIENT)
Dept: TRANSPLANT | Facility: CLINIC | Age: 59
End: 2019-04-08

## 2019-04-08 ENCOUNTER — TELEPHONE (OUTPATIENT)
Dept: INFECTIOUS DISEASES | Facility: CLINIC | Age: 59
End: 2019-04-08

## 2019-04-08 DIAGNOSIS — Z94.2 LUNG REPLACED BY TRANSPLANT: ICD-10-CM

## 2019-04-08 RX ORDER — IBUPROFEN 200 MG
CAPSULE ORAL
Qty: 60 TABLET | Refills: 0 | Status: SHIPPED | OUTPATIENT
Start: 2019-04-08 | End: 2019-04-08 | Stop reason: SDUPTHER

## 2019-04-08 RX ORDER — CALCIUM CARBONATE/VITAMIN D3 600MG-5MCG
1 TABLET ORAL 2 TIMES DAILY
Qty: 60 TABLET | Refills: 11 | Status: SHIPPED | OUTPATIENT
Start: 2019-04-08 | End: 2020-07-26

## 2019-04-08 RX ORDER — EVEROLIMUS 0.75 MG/1
0.75 TABLET ORAL 2 TIMES DAILY
Qty: 60 TABLET | Refills: 11 | Status: SHIPPED | OUTPATIENT
Start: 2019-04-08 | End: 2019-04-17 | Stop reason: ALTCHOICE

## 2019-04-08 NOTE — TELEPHONE ENCOUNTER
----- Message from Darrick Wolfe MD sent at 4/8/2019 12:47 PM CDT -----  Decrease everolimus to .75 bid

## 2019-04-08 NOTE — TELEPHONE ENCOUNTER
----- Message from Yue Lowry MD sent at 4/7/2019  5:47 PM CDT -----  Regarding: FW: CMV results- study labs  Please call patient to schedule qweekly labs in [place] on Fridays - orders entered for CBC CMP and CMV    ----- Message -----  From: BETO Almeida  Sent: 4/6/2019   4:29 PM  To: Yue Lowry MD  Subject: CMV results- study labs                          Dr. Lowry,    The CMV that was drawn at [facility] is still pending, but I got back results today from the one drawn for the study. They reported it as 1076 IU/ml.     Last time when our lab reported 522 on 3/18/19 the study lab came back as 269. Just wanted to let you know.     I'll forward you the results from the lab drawn here when I get them.    Thanks,  Elvia

## 2019-04-08 NOTE — TELEPHONE ENCOUNTER
Received written orders from Dr. Wolfe instructing patient to decrease everolimus to 0.75 mg bid from 1/0.75 mg.  Contacted patient and instructed him to make the above dose change.  Patient will have repeat labs on Friday with his already scheduled lab.  Patient repeated the change and verbalized understanding.

## 2019-04-12 ENCOUNTER — LAB VISIT (OUTPATIENT)
Dept: LAB | Facility: HOSPITAL | Age: 59
End: 2019-04-12
Attending: INTERNAL MEDICINE
Payer: COMMERCIAL

## 2019-04-12 DIAGNOSIS — B25.9 CYTOMEGALOVIRUS INFECTION, UNSPECIFIED CYTOMEGALOVIRAL INFECTION TYPE: ICD-10-CM

## 2019-04-12 DIAGNOSIS — Z94.2 LUNG TRANSPLANTED: ICD-10-CM

## 2019-04-12 LAB
ALBUMIN SERPL BCP-MCNC: 3.3 G/DL (ref 3.5–5.2)
ALP SERPL-CCNC: 86 U/L (ref 55–135)
ALT SERPL W/O P-5'-P-CCNC: 22 U/L (ref 10–44)
ANION GAP SERPL CALC-SCNC: 10 MMOL/L (ref 8–16)
AST SERPL-CCNC: 23 U/L (ref 10–40)
BASOPHILS # BLD AUTO: 0.07 K/UL (ref 0–0.2)
BASOPHILS NFR BLD: 1.3 % (ref 0–1.9)
BILIRUB SERPL-MCNC: 0.5 MG/DL (ref 0.1–1)
BUN SERPL-MCNC: 23 MG/DL (ref 6–20)
CALCIUM SERPL-MCNC: 9.6 MG/DL (ref 8.7–10.5)
CHLORIDE SERPL-SCNC: 100 MMOL/L (ref 95–110)
CO2 SERPL-SCNC: 27 MMOL/L (ref 23–29)
CREAT SERPL-MCNC: 1.7 MG/DL (ref 0.5–1.4)
DIFFERENTIAL METHOD: ABNORMAL
EOSINOPHIL # BLD AUTO: 0.3 K/UL (ref 0–0.5)
EOSINOPHIL NFR BLD: 6.2 % (ref 0–8)
ERYTHROCYTE [DISTWIDTH] IN BLOOD BY AUTOMATED COUNT: 12.6 % (ref 11.5–14.5)
EST. GFR  (AFRICAN AMERICAN): 51 ML/MIN/1.73 M^2
EST. GFR  (NON AFRICAN AMERICAN): 44.1 ML/MIN/1.73 M^2
GLUCOSE SERPL-MCNC: 214 MG/DL (ref 70–110)
HCT VFR BLD AUTO: 34.2 % (ref 40–54)
HGB BLD-MCNC: 11.2 G/DL (ref 14–18)
IMM GRANULOCYTES # BLD AUTO: 0.02 K/UL (ref 0–0.04)
IMM GRANULOCYTES NFR BLD AUTO: 0.4 % (ref 0–0.5)
LYMPHOCYTES # BLD AUTO: 0.8 K/UL (ref 1–4.8)
LYMPHOCYTES NFR BLD: 14 % (ref 18–48)
MCH RBC QN AUTO: 29.6 PG (ref 27–31)
MCHC RBC AUTO-ENTMCNC: 32.7 G/DL (ref 32–36)
MCV RBC AUTO: 91 FL (ref 82–98)
MONOCYTES # BLD AUTO: 0.7 K/UL (ref 0.3–1)
MONOCYTES NFR BLD: 12.9 % (ref 4–15)
NEUTROPHILS # BLD AUTO: 3.6 K/UL (ref 1.8–7.7)
NEUTROPHILS NFR BLD: 65.2 % (ref 38–73)
NRBC BLD-RTO: 0 /100 WBC
PLATELET # BLD AUTO: 279 K/UL (ref 150–350)
PMV BLD AUTO: 9.3 FL (ref 9.2–12.9)
POTASSIUM SERPL-SCNC: 4 MMOL/L (ref 3.5–5.1)
PROT SERPL-MCNC: 7.3 G/DL (ref 6–8.4)
RBC # BLD AUTO: 3.78 M/UL (ref 4.6–6.2)
SODIUM SERPL-SCNC: 137 MMOL/L (ref 136–145)
WBC # BLD AUTO: 5.5 K/UL (ref 3.9–12.7)

## 2019-04-12 PROCEDURE — 85025 COMPLETE CBC W/AUTO DIFF WBC: CPT

## 2019-04-12 PROCEDURE — 80053 COMPREHEN METABOLIC PANEL: CPT

## 2019-04-12 PROCEDURE — 36415 COLL VENOUS BLD VENIPUNCTURE: CPT | Mod: PO

## 2019-04-12 PROCEDURE — 80169 DRUG ASSAY EVEROLIMUS: CPT

## 2019-04-15 LAB
CMV DNA SERPL NAA+PROBE-ACNC: 2460 IU/ML
EVEROLIMUS BLD-MCNC: 2.7 NG/ML

## 2019-04-16 ENCOUNTER — OFFICE VISIT (OUTPATIENT)
Dept: INFECTIOUS DISEASES | Facility: CLINIC | Age: 59
End: 2019-04-16
Payer: COMMERCIAL

## 2019-04-16 ENCOUNTER — TELEPHONE (OUTPATIENT)
Dept: INFECTIOUS DISEASES | Facility: CLINIC | Age: 59
End: 2019-04-16

## 2019-04-16 ENCOUNTER — LAB VISIT (OUTPATIENT)
Dept: LAB | Facility: HOSPITAL | Age: 59
End: 2019-04-16
Payer: COMMERCIAL

## 2019-04-16 DIAGNOSIS — Z94.2 LUNG TRANSPLANTED: ICD-10-CM

## 2019-04-16 DIAGNOSIS — Z00.6 RESEARCH SUBJECT: ICD-10-CM

## 2019-04-16 DIAGNOSIS — Z00.6 RESEARCH STUDY PATIENT: ICD-10-CM

## 2019-04-16 DIAGNOSIS — B25.9 CYTOMEGALOVIRUS INFECTION, UNSPECIFIED CYTOMEGALOVIRAL INFECTION TYPE: Primary | ICD-10-CM

## 2019-04-16 DIAGNOSIS — B25.9 CYTOMEGALOVIRUS INFECTION, UNSPECIFIED CYTOMEGALOVIRAL INFECTION TYPE: ICD-10-CM

## 2019-04-16 LAB
ANION GAP SERPL CALC-SCNC: 10 MMOL/L (ref 8–16)
BUN SERPL-MCNC: 24 MG/DL (ref 6–20)
CALCIUM SERPL-MCNC: 10.3 MG/DL (ref 8.7–10.5)
CHLORIDE SERPL-SCNC: 93 MMOL/L (ref 95–110)
CO2 SERPL-SCNC: 33 MMOL/L (ref 23–29)
CREAT SERPL-MCNC: 2.1 MG/DL (ref 0.5–1.4)
EST. GFR  (AFRICAN AMERICAN): 39.5 ML/MIN/1.73 M^2
EST. GFR  (NON AFRICAN AMERICAN): 34.2 ML/MIN/1.73 M^2
GLUCOSE SERPL-MCNC: 214 MG/DL (ref 70–110)
POTASSIUM SERPL-SCNC: 3.9 MMOL/L (ref 3.5–5.1)
SODIUM SERPL-SCNC: 136 MMOL/L (ref 136–145)

## 2019-04-16 PROCEDURE — 80048 BASIC METABOLIC PNL TOTAL CA: CPT

## 2019-04-16 PROCEDURE — 99499 NO LOS: ICD-10-PCS | Mod: S$GLB,,, | Performed by: CLINICAL NURSE SPECIALIST

## 2019-04-16 PROCEDURE — 99999 PR PBB SHADOW E&M-EST. PATIENT-LVL III: CPT | Mod: PBBFAC,,, | Performed by: CLINICAL NURSE SPECIALIST

## 2019-04-16 PROCEDURE — 99499 UNLISTED E&M SERVICE: CPT | Mod: S$GLB,,, | Performed by: CLINICAL NURSE SPECIALIST

## 2019-04-16 PROCEDURE — 99999 PR PBB SHADOW E&M-EST. PATIENT-LVL III: ICD-10-PCS | Mod: PBBFAC,,, | Performed by: CLINICAL NURSE SPECIALIST

## 2019-04-16 NOTE — TELEPHONE ENCOUNTER
Discussed results with patient  Will continue to monitor CMV VL as no change on log scale    Patient reports feeling well, no fevers, chills, night sweats, SOB, CP, n/v/d, abdominal pain, diarrhea.  Patient will continue to get weekly blood work - will call with any new symptoms.

## 2019-04-16 NOTE — PROGRESS NOTES
Roman Refractory or Resistant CMV Treatment Study  Protocol: -303  IRB# [id number]  PI: Jaron ALY)  Site: 002     VISIT 17/ WEEK 18       Date of Visit: 04/16/2019     Mr. Martin Hendrix presents to clinic for Visit 17/Week 18 for Roman CMV treatment study. Patient is now in follow up phase of study. Received Maribavir 400mg po BID for 8 weeks as per study protocol that completed on 2/6/19. CMV viremia returned at end of 8 weeks on study drug. Patient was started on Foscarnet that was discontinued 3/3/19 after patient admitted with AVILA.     ID, Dr. Lowry, following patient for CMV, maintains off Foscarnet. Last CMV on 4/12/.      Patient states he is feeling well. Denies any new signs, symptoms. No Adverse events assessed, no episodes of rejection assessed, no CMV invasive disease or CMV syndrome assessed. Denies fever, chills, n/v/d, chest pain, shortness of breath.      Plan:  1. Study labs drawn and processed per protocol.  2. Next research study visit with labs at visit on 05/03/2019. This will be EOS visit  3. Call clinic with any questions/concerns.

## 2019-04-17 ENCOUNTER — TELEPHONE (OUTPATIENT)
Dept: TRANSPLANT | Facility: CLINIC | Age: 59
End: 2019-04-17

## 2019-04-17 ENCOUNTER — PATIENT MESSAGE (OUTPATIENT)
Dept: TRANSPLANT | Facility: CLINIC | Age: 59
End: 2019-04-17

## 2019-04-17 DIAGNOSIS — Z94.2 LUNG REPLACED BY TRANSPLANT: ICD-10-CM

## 2019-04-17 RX ORDER — TACROLIMUS 0.5 MG/1
1 CAPSULE ORAL EVERY 12 HOURS
Qty: 120 CAPSULE | Refills: 11 | Status: SHIPPED | OUTPATIENT
Start: 2019-04-17 | End: 2019-05-06 | Stop reason: SDUPTHER

## 2019-04-17 NOTE — TELEPHONE ENCOUNTER
Returned call to Linad at [facility].  Informed Linda to continue line care per agency protocol.  Unsure of when foscarnet will be restarted.  Linda asked to let her know as soon as ID restarts it so they can place an order for more as they only have a limited supply in stock as of now.

## 2019-04-17 NOTE — TELEPHONE ENCOUNTER
----- Message from Emma Finley sent at 4/17/2019 10:53 AM CDT -----  Contact: [facility]  Needs Advice    Reason for call: Discuss IV Antibiotic orders.        Communication Preference: Linda [phone]    Additional Information: n/a

## 2019-04-17 NOTE — TELEPHONE ENCOUNTER
Received written orders from Dr. Wolfe instructing patient to discontinue everolimus and adjust Prograf.  Received recommendations from Thony Lyles, Donte and approved by Dr. Wolfe to increase patient's Prograf to 1 mg bid when stopping the everolimus.  Patient will have repeat labs next Wednesday 4/24/19 in [place].  My Chart message sent to patient notifying him of the above dose changes.  Asked patient to confirm that message was received.

## 2019-04-17 NOTE — TELEPHONE ENCOUNTER
----- Message from Darrick Wolfe MD sent at 4/16/2019  1:55 PM CDT -----  Creatinine continues to go up. These levels are too erratic and not touching the CMV. D/C everolimus altogether and keep a tac from 5-7

## 2019-04-18 ENCOUNTER — LAB VISIT (OUTPATIENT)
Dept: LAB | Facility: HOSPITAL | Age: 59
End: 2019-04-18
Attending: NURSE PRACTITIONER
Payer: COMMERCIAL

## 2019-04-18 ENCOUNTER — TELEPHONE (OUTPATIENT)
Dept: INFECTIOUS DISEASES | Facility: CLINIC | Age: 59
End: 2019-04-18

## 2019-04-18 DIAGNOSIS — B25.9 CYTOMEGALOVIRUS INFECTION, UNSPECIFIED CYTOMEGALOVIRAL INFECTION TYPE: ICD-10-CM

## 2019-04-18 LAB
ALBUMIN SERPL BCP-MCNC: 3.4 G/DL (ref 3.5–5.2)
ALP SERPL-CCNC: 85 U/L (ref 55–135)
ALT SERPL W/O P-5'-P-CCNC: 21 U/L (ref 10–44)
ANION GAP SERPL CALC-SCNC: 7 MMOL/L (ref 8–16)
AST SERPL-CCNC: 21 U/L (ref 10–40)
BASOPHILS # BLD AUTO: 0.07 K/UL (ref 0–0.2)
BASOPHILS NFR BLD: 1.5 % (ref 0–1.9)
BILIRUB SERPL-MCNC: 0.5 MG/DL (ref 0.1–1)
BUN SERPL-MCNC: 33 MG/DL (ref 6–20)
CALCIUM SERPL-MCNC: 9.6 MG/DL (ref 8.7–10.5)
CHLORIDE SERPL-SCNC: 99 MMOL/L (ref 95–110)
CMV DNA SERPL NAA+PROBE-ACNC: 4260 IU/ML
CO2 SERPL-SCNC: 30 MMOL/L (ref 23–29)
CREAT SERPL-MCNC: 2 MG/DL (ref 0.5–1.4)
DIFFERENTIAL METHOD: ABNORMAL
EOSINOPHIL # BLD AUTO: 0.5 K/UL (ref 0–0.5)
EOSINOPHIL NFR BLD: 10.6 % (ref 0–8)
ERYTHROCYTE [DISTWIDTH] IN BLOOD BY AUTOMATED COUNT: 12.7 % (ref 11.5–14.5)
EST. GFR  (AFRICAN AMERICAN): 41.9 ML/MIN/1.73 M^2
EST. GFR  (NON AFRICAN AMERICAN): 36.2 ML/MIN/1.73 M^2
GLUCOSE SERPL-MCNC: 234 MG/DL (ref 70–110)
HCT VFR BLD AUTO: 33.2 % (ref 40–54)
HGB BLD-MCNC: 10.4 G/DL (ref 14–18)
IMM GRANULOCYTES # BLD AUTO: 0.03 K/UL (ref 0–0.04)
IMM GRANULOCYTES NFR BLD AUTO: 0.7 % (ref 0–0.5)
LYMPHOCYTES # BLD AUTO: 1.2 K/UL (ref 1–4.8)
LYMPHOCYTES NFR BLD: 26.3 % (ref 18–48)
MCH RBC QN AUTO: 28.6 PG (ref 27–31)
MCHC RBC AUTO-ENTMCNC: 31.3 G/DL (ref 32–36)
MCV RBC AUTO: 91 FL (ref 82–98)
MONOCYTES # BLD AUTO: 0.8 K/UL (ref 0.3–1)
MONOCYTES NFR BLD: 18.5 % (ref 4–15)
NEUTROPHILS # BLD AUTO: 1.9 K/UL (ref 1.8–7.7)
NEUTROPHILS NFR BLD: 42.4 % (ref 38–73)
NRBC BLD-RTO: 0 /100 WBC
PLATELET # BLD AUTO: 253 K/UL (ref 150–350)
PMV BLD AUTO: 9.1 FL (ref 9.2–12.9)
POTASSIUM SERPL-SCNC: 4.3 MMOL/L (ref 3.5–5.1)
PROT SERPL-MCNC: 7.4 G/DL (ref 6–8.4)
RBC # BLD AUTO: 3.64 M/UL (ref 4.6–6.2)
SODIUM SERPL-SCNC: 136 MMOL/L (ref 136–145)
WBC # BLD AUTO: 4.53 K/UL (ref 3.9–12.7)

## 2019-04-18 PROCEDURE — 36415 COLL VENOUS BLD VENIPUNCTURE: CPT | Mod: PO

## 2019-04-18 PROCEDURE — 85025 COMPLETE CBC W/AUTO DIFF WBC: CPT

## 2019-04-18 PROCEDURE — 80053 COMPREHEN METABOLIC PANEL: CPT

## 2019-04-19 LAB — CMV DNA SERPL NAA+PROBE-ACNC: 3870 IU/ML

## 2019-04-19 NOTE — TELEPHONE ENCOUNTER
Patient called about CMV results - advised he return to hospital for initiation of foscarnet.  Patient reports feeling malaise and fatigue, but no fevers, chills, n/v/d, abdominal pain, SOB, cough.  Patient agreed to return on Monday 4/22/2019 for direct admission to lung transplant service.

## 2019-04-22 ENCOUNTER — HOSPITAL ENCOUNTER (INPATIENT)
Facility: HOSPITAL | Age: 59
LOS: 3 days | Discharge: HOME | DRG: 866 | End: 2019-04-25
Attending: INTERNAL MEDICINE | Admitting: INTERNAL MEDICINE
Payer: MEDICARE

## 2019-04-22 DIAGNOSIS — D84.9 IMMUNOSUPPRESSION: ICD-10-CM

## 2019-04-22 DIAGNOSIS — B25.9 CYTOMEGALOVIRUS (CMV) VIREMIA: Primary | ICD-10-CM

## 2019-04-22 DIAGNOSIS — E11.65 TYPE 2 DIABETES MELLITUS WITH HYPERGLYCEMIA, WITH LONG-TERM CURRENT USE OF INSULIN: Chronic | ICD-10-CM

## 2019-04-22 DIAGNOSIS — Z79.4 TYPE 2 DIABETES MELLITUS WITH HYPERGLYCEMIA, WITH LONG-TERM CURRENT USE OF INSULIN: Chronic | ICD-10-CM

## 2019-04-22 DIAGNOSIS — E83.42 HYPOMAGNESEMIA: ICD-10-CM

## 2019-04-22 DIAGNOSIS — Z94.2 LUNG REPLACED BY TRANSPLANT: ICD-10-CM

## 2019-04-22 DIAGNOSIS — E66.09 CLASS 1 OBESITY DUE TO EXCESS CALORIES WITH SERIOUS COMORBIDITY AND BODY MASS INDEX (BMI) OF 30.0 TO 30.9 IN ADULT: ICD-10-CM

## 2019-04-22 DIAGNOSIS — T38.0X5A ADRENAL CORTICAL STEROIDS CAUSING ADVERSE EFFECT IN THERAPEUTIC USE: ICD-10-CM

## 2019-04-22 LAB
ALBUMIN SERPL BCP-MCNC: 3.3 G/DL (ref 3.5–5.2)
ALP SERPL-CCNC: 91 U/L (ref 55–135)
ALT SERPL W/O P-5'-P-CCNC: 20 U/L (ref 10–44)
ANION GAP SERPL CALC-SCNC: 5 MMOL/L (ref 8–16)
AST SERPL-CCNC: 21 U/L (ref 10–40)
BASOPHILS # BLD AUTO: 0.05 K/UL (ref 0–0.2)
BASOPHILS NFR BLD: 0.8 % (ref 0–1.9)
BILIRUB SERPL-MCNC: 0.3 MG/DL (ref 0.1–1)
BUN SERPL-MCNC: 23 MG/DL (ref 6–20)
CALCIUM SERPL-MCNC: 9.3 MG/DL (ref 8.7–10.5)
CHLORIDE SERPL-SCNC: 98 MMOL/L (ref 95–110)
CO2 SERPL-SCNC: 29 MMOL/L (ref 23–29)
CREAT SERPL-MCNC: 1.8 MG/DL (ref 0.5–1.4)
DIFFERENTIAL METHOD: ABNORMAL
EOSINOPHIL # BLD AUTO: 0.2 K/UL (ref 0–0.5)
EOSINOPHIL NFR BLD: 3.3 % (ref 0–8)
ERYTHROCYTE [DISTWIDTH] IN BLOOD BY AUTOMATED COUNT: 12.9 % (ref 11.5–14.5)
EST. GFR  (AFRICAN AMERICAN): 47.6 ML/MIN/1.73 M^2
EST. GFR  (NON AFRICAN AMERICAN): 41.2 ML/MIN/1.73 M^2
GLUCOSE SERPL-MCNC: 384 MG/DL (ref 70–110)
HCT VFR BLD AUTO: 29.6 % (ref 40–54)
HGB BLD-MCNC: 9.8 G/DL (ref 14–18)
IMM GRANULOCYTES # BLD AUTO: 0.03 K/UL (ref 0–0.04)
IMM GRANULOCYTES NFR BLD AUTO: 0.5 % (ref 0–0.5)
LYMPHOCYTES # BLD AUTO: 1 K/UL (ref 1–4.8)
LYMPHOCYTES NFR BLD: 15.7 % (ref 18–48)
MCH RBC QN AUTO: 28.9 PG (ref 27–31)
MCHC RBC AUTO-ENTMCNC: 33.1 G/DL (ref 32–36)
MCV RBC AUTO: 87 FL (ref 82–98)
MONOCYTES # BLD AUTO: 0.6 K/UL (ref 0.3–1)
MONOCYTES NFR BLD: 9.3 % (ref 4–15)
NEUTROPHILS # BLD AUTO: 4.5 K/UL (ref 1.8–7.7)
NEUTROPHILS NFR BLD: 70.4 % (ref 38–73)
NRBC BLD-RTO: 0 /100 WBC
PLATELET # BLD AUTO: 245 K/UL (ref 150–350)
PMV BLD AUTO: 8.6 FL (ref 9.2–12.9)
POCT GLUCOSE: 247 MG/DL (ref 70–110)
POCT GLUCOSE: 348 MG/DL (ref 70–110)
POTASSIUM SERPL-SCNC: 4.1 MMOL/L (ref 3.5–5.1)
PROT SERPL-MCNC: 7.4 G/DL (ref 6–8.4)
RBC # BLD AUTO: 3.39 M/UL (ref 4.6–6.2)
SODIUM SERPL-SCNC: 132 MMOL/L (ref 136–145)
WBC # BLD AUTO: 6.43 K/UL (ref 3.9–12.7)

## 2019-04-22 PROCEDURE — 63600175 PHARM REV CODE 636 W HCPCS: Performed by: NURSE PRACTITIONER

## 2019-04-22 PROCEDURE — 99222 1ST HOSP IP/OBS MODERATE 55: CPT | Mod: AI,,, | Performed by: INTERNAL MEDICINE

## 2019-04-22 PROCEDURE — 99222 PR INITIAL HOSPITAL CARE,LEVL II: ICD-10-PCS | Mod: AI,,, | Performed by: INTERNAL MEDICINE

## 2019-04-22 PROCEDURE — 80053 COMPREHEN METABOLIC PANEL: CPT

## 2019-04-22 PROCEDURE — S5571 INSULIN DISPOS PEN 3 ML: HCPCS | Performed by: NURSE PRACTITIONER

## 2019-04-22 PROCEDURE — 25000003 PHARM REV CODE 250: Performed by: PHYSICIAN ASSISTANT

## 2019-04-22 PROCEDURE — 99232 PR SUBSEQUENT HOSPITAL CARE,LEVL II: ICD-10-PCS | Mod: ,,, | Performed by: NURSE PRACTITIONER

## 2019-04-22 PROCEDURE — 20600001 HC STEP DOWN PRIVATE ROOM

## 2019-04-22 PROCEDURE — 25000003 PHARM REV CODE 250: Performed by: INTERNAL MEDICINE

## 2019-04-22 PROCEDURE — 63600175 PHARM REV CODE 636 W HCPCS: Performed by: PHYSICIAN ASSISTANT

## 2019-04-22 PROCEDURE — 63600175 PHARM REV CODE 636 W HCPCS: Mod: JG | Performed by: INTERNAL MEDICINE

## 2019-04-22 PROCEDURE — 99232 SBSQ HOSP IP/OBS MODERATE 35: CPT | Mod: ,,, | Performed by: NURSE PRACTITIONER

## 2019-04-22 PROCEDURE — 85025 COMPLETE CBC W/AUTO DIFF WBC: CPT

## 2019-04-22 RX ORDER — INSULIN ASPART 100 [IU]/ML
5 INJECTION, SOLUTION INTRAVENOUS; SUBCUTANEOUS
Status: DISCONTINUED | OUTPATIENT
Start: 2019-04-22 | End: 2019-04-25 | Stop reason: HOSPADM

## 2019-04-22 RX ORDER — FAMOTIDINE 20 MG/1
20 TABLET, FILM COATED ORAL 2 TIMES DAILY
Status: DISCONTINUED | OUTPATIENT
Start: 2019-04-22 | End: 2019-04-25 | Stop reason: HOSPADM

## 2019-04-22 RX ORDER — TACROLIMUS 1 MG/1
1 CAPSULE ORAL 2 TIMES DAILY
Status: DISCONTINUED | OUTPATIENT
Start: 2019-04-22 | End: 2019-04-25 | Stop reason: HOSPADM

## 2019-04-22 RX ORDER — PRAVASTATIN SODIUM 20 MG/1
20 TABLET ORAL DAILY
Status: DISCONTINUED | OUTPATIENT
Start: 2019-04-22 | End: 2019-04-25 | Stop reason: HOSPADM

## 2019-04-22 RX ORDER — POTASSIUM CHLORIDE 20 MEQ/15ML
60 SOLUTION ORAL
Status: CANCELLED | OUTPATIENT
Start: 2019-04-22

## 2019-04-22 RX ORDER — POTASSIUM CHLORIDE 20 MEQ/15ML
40 SOLUTION ORAL
Status: CANCELLED | OUTPATIENT
Start: 2019-04-22

## 2019-04-22 RX ORDER — ASPIRIN 81 MG/1
81 TABLET ORAL DAILY
Status: DISCONTINUED | OUTPATIENT
Start: 2019-04-22 | End: 2019-04-25 | Stop reason: HOSPADM

## 2019-04-22 RX ORDER — FLUTICASONE PROPIONATE 50 MCG
1 SPRAY, SUSPENSION (ML) NASAL DAILY
Status: DISCONTINUED | OUTPATIENT
Start: 2019-04-22 | End: 2019-04-25 | Stop reason: HOSPADM

## 2019-04-22 RX ORDER — FOLIC ACID 1 MG/1
1000 TABLET ORAL DAILY
Status: DISCONTINUED | OUTPATIENT
Start: 2019-04-22 | End: 2019-04-25 | Stop reason: HOSPADM

## 2019-04-22 RX ORDER — AMLODIPINE BESYLATE 5 MG/1
5 TABLET ORAL DAILY
Status: DISCONTINUED | OUTPATIENT
Start: 2019-04-22 | End: 2019-04-25 | Stop reason: HOSPADM

## 2019-04-22 RX ORDER — LANOLIN ALCOHOL/MO/W.PET/CERES
400 CREAM (GRAM) TOPICAL 2 TIMES DAILY PRN
Status: DISCONTINUED | OUTPATIENT
Start: 2019-04-22 | End: 2019-04-25 | Stop reason: HOSPADM

## 2019-04-22 RX ORDER — IBUPROFEN 200 MG
16 TABLET ORAL
Status: DISCONTINUED | OUTPATIENT
Start: 2019-04-22 | End: 2019-04-25 | Stop reason: HOSPADM

## 2019-04-22 RX ORDER — ENOXAPARIN SODIUM 100 MG/ML
40 INJECTION SUBCUTANEOUS EVERY 24 HOURS
Status: DISCONTINUED | OUTPATIENT
Start: 2019-04-22 | End: 2019-04-22

## 2019-04-22 RX ORDER — IBUPROFEN 200 MG
24 TABLET ORAL
Status: DISCONTINUED | OUTPATIENT
Start: 2019-04-22 | End: 2019-04-25 | Stop reason: HOSPADM

## 2019-04-22 RX ORDER — PREDNISONE 5 MG/1
5 TABLET ORAL DAILY
Status: DISCONTINUED | OUTPATIENT
Start: 2019-04-22 | End: 2019-04-25 | Stop reason: HOSPADM

## 2019-04-22 RX ORDER — GLUCAGON 1 MG
1 KIT INJECTION
Status: DISCONTINUED | OUTPATIENT
Start: 2019-04-22 | End: 2019-04-25 | Stop reason: HOSPADM

## 2019-04-22 RX ORDER — INSULIN ASPART 100 [IU]/ML
0-5 INJECTION, SOLUTION INTRAVENOUS; SUBCUTANEOUS
Status: DISCONTINUED | OUTPATIENT
Start: 2019-04-22 | End: 2019-04-25 | Stop reason: HOSPADM

## 2019-04-22 RX ORDER — SULFAMETHOXAZOLE AND TRIMETHOPRIM 400; 80 MG/1; MG/1
1 TABLET ORAL
Status: DISCONTINUED | OUTPATIENT
Start: 2019-04-22 | End: 2019-04-25 | Stop reason: HOSPADM

## 2019-04-22 RX ADMIN — INSULIN ASPART 5 UNITS: 100 INJECTION, SOLUTION INTRAVENOUS; SUBCUTANEOUS at 06:04

## 2019-04-22 RX ADMIN — INSULIN ASPART 1 UNITS: 100 INJECTION, SOLUTION INTRAVENOUS; SUBCUTANEOUS at 10:04

## 2019-04-22 RX ADMIN — APIXABAN 2.5 MG: 2.5 TABLET, FILM COATED ORAL at 08:04

## 2019-04-22 RX ADMIN — FAMOTIDINE 20 MG: 20 TABLET, FILM COATED ORAL at 08:04

## 2019-04-22 RX ADMIN — TACROLIMUS 1 MG: 1 CAPSULE ORAL at 05:04

## 2019-04-22 RX ADMIN — SODIUM CHLORIDE 500 ML: 0.9 INJECTION, SOLUTION INTRAVENOUS at 05:04

## 2019-04-22 RX ADMIN — SULFAMETHOXAZOLE AND TRIMETHOPRIM 1 TABLET: 400; 80 TABLET ORAL at 05:04

## 2019-04-22 RX ADMIN — FOSCARNET SODIUM 6000 MG: 24 INJECTION, SOLUTION INTRAVENOUS at 06:04

## 2019-04-22 RX ADMIN — INSULIN DETEMIR 10 UNITS: 100 INJECTION, SOLUTION SUBCUTANEOUS at 10:04

## 2019-04-22 RX ADMIN — INSULIN ASPART 4 UNITS: 100 INJECTION, SOLUTION INTRAVENOUS; SUBCUTANEOUS at 05:04

## 2019-04-22 NOTE — PROGRESS NOTES
Pt ambulated to unit independently - in no apparent distress. CLARE Peña notified. Will continue to monitor.

## 2019-04-22 NOTE — HPI
Reason for Consult: Management of T2DM, Hyperglycemia     Reason for Consult: Management of type 2 DM, Hyperglycemia      Surgical Procedure and Date: 08/27/2017 - Bilateral Lung Transplant     Diabetes diagnosis year: 2017     Home Diabetes Medications:     Tresiba 13 units HS and humalog 7 units with meals and correctionscale.    How often checking glucose at home? BID   BG readings on regimen: <170  Hypoglycemia on the regimen?  no  Missed doses on regimen?  None     Diabetes Complications include:     Hyperglycemia     Complicating diabetes co morbidities:   KRISTYN and Glucocorticoid use , A-fib, AVILA, Pancreatitis, Osteopenia     HPI:   Patient is a 58 y.o. male with a diagnosis of type 2 DM on MDI. Also with sarcoidosis (s/p BLT), KRISTYN, osteopenia, and Afib. Patient presents today as a direct admission for administration of foscarnet on 04/22/2019. Endocrinology consulted to manage DM2/Hyperglycemia.    Lab Results   Component Value Date    HGBA1C 10.9 (H) 02/12/2019

## 2019-04-22 NOTE — PLAN OF CARE
Problem: Adult Inpatient Plan of Care  Goal: Plan of Care Review  Outcome: Ongoing (interventions implemented as appropriate)  Pt AAOx4, 's/70's, all other VSS, afebrile. Plan to start Foscarnet today. Endocrine and ID following. Pt in no apparent distress, no c/o pain. Pt up independently. Pt instructed to use call bell for requests. Will continue to monitor.

## 2019-04-22 NOTE — SUBJECTIVE & OBJECTIVE
Past Medical History:   Diagnosis Date    AVILA (acute kidney injury) 8/27/2017    Atrial fibrillation 8/23/2017    On home oxygen therapy     KRISTYN on CPAP     Osteopenia     Pancreatitis 2009    hospitalized    Pulmonary hypertension     Pure hypercholesterolemia 11/15/2017    Sarcoidosis     Shortness of breath     Type 2 diabetes mellitus 11/5/2017       Past Surgical History:   Procedure Laterality Date    ABDOMINAL SURGERY      6 weeks old    BRONCHOSCOPY      CHEST TUBE INSERTION      CHOLECYSTECTOMY      COLONOSCOPY      EGD (ESOPHAGOGASTRODUODENOSCOPY) N/A 8/6/2018    Performed by Ramu Eisenberg MD at [facility] ENDO (2ND FLR)    flexible bronchoscopy with tissue biopsy N/A 3/21/2018    Performed by [facility] Provider at [facility] OR 2ND FLR    flexible bronchoscopy with tissue biopsy CPT 08222 N/A 8/22/2018    Performed by [facility] at [facility] OR 2ND FLR    flexible bronchoscopy with tissue biopsy CPT 45421 N/A 4/19/2018    Performed by [facility] Provider at [facility] OR 2ND FLR    flexible bronchoscopy with tissue biopsy CPT 45393 N/A 2/21/2018    Performed by [facility] Provider at [facility] OR 2ND FLR    flexible bronchoscopy with tissue biopsy CPT 41402 N/A 12/20/2017    Performed by [facility] Provider at [facility] OR 2ND FLR    flexible bronchoscopy with tissue biopsy CPT 47667 N/A 11/22/2017    Performed by [facility] Provider at [facility] OR 2ND FLR    flexible bronchoscopy with tissue biopsy CPT 07313 N/A 11/2/2017    Performed by [facility] at [facility] OR 2ND FLR    flexible bronchoscopy with tissue biopsy CPT 25556 N/A 10/4/2017    Performed by [facility] at [facility] OR 2ND FLR    flexible bronchoscopy with tissue biopy CPT 94694 N/A 11/20/2018    Performed by [facility] Provider at [facility] OR 2ND FLR    HEART CATH-RIGHT Right 5/22/2017    Performed by Shanell Higuera MD at [facility] CATH LAB    INSERTION, CATHETER, CENTRAL VENOUS,  "HEMODIALYSIS, TUNNELED N/A 3/13/2019    Performed by Edy Gonzalez MD at [facility] CATH LAB    LUNG BIOPSY      LUNG TRANSPLANT  08/2017    PH MONITORING, ESOPHAGUS, WIRELESS, (OFF REFLUX MEDS) N/A 8/6/2018    Performed by Ramu Eisenberg MD at [facility] ENDO (2ND FLR)    TRANSPLANT-LUNG Bilateral 8/22/2017    Performed by Paulo German MD at [facility] OR 2ND FLR       Review of patient's allergies indicates:  No Known Allergies    PTA Medications   Medication Sig    amLODIPine (NORVASC) 5 MG tablet Take 1 tablet (5 mg total) by mouth once daily.    apixaban (ELIQUIS) 2.5 mg Tab Take 1 tablet (2.5 mg total) by mouth 2 (two) times daily.    calcium-vitamin D tablet 600 mg-200 units Take 1 tablet by mouth 2 (two) times daily.    ECOTRIN LOW STRENGTH 81 mg EC tablet TAKE 1 TABLET DAILY    famotidine (PEPCID) 20 MG tablet TAKE 1 TABLET TWICE A DAY    fluticasone (FLONASE) 50 mcg/actuation nasal spray 1 spray by Each Nare route once daily.    folic acid (FOLVITE) 1 MG tablet TAKE 1 TABLET DAILY    insulin degludec (TRESIBA FLEXTOUCH U-200) 200 unit/mL (3 mL) InPn Inject 13 Units into the skin every evening.    insulin lispro (HUMALOG KWIKPEN INSULIN) 100 unit/mL pen Inject 7 Units into the skin 3 (three) times daily before meals.    lancets Misc To check BG 4 times daily, to use with insurance preferred meter    magnesium chloride (MAG 64) 64 mg TbEC Take 2 tablets (128 mg total) by mouth once daily.    magnesium oxide (MAG-OX) 400 mg (241.3 mg magnesium) tablet Take 1 tablet (400 mg total) by mouth 2 (two) times daily as needed.    ONETOUCH VERIO Strp USE TO CHECK BLOOD GLUCOSE FOUR TIMES A DAY, TO USE WITH INSURANCE PREFERRED METER    pen needle, diabetic (BD ULTRA-FINE JIM PEN NEEDLE) 32 gauge x 5/32" Ndle Uses 4 times daily, on multiple daily insulin injections    pravastatin (PRAVACHOL) 20 MG tablet TAKE 1 TABLET BY MOUTH ONCE DAILY    predniSONE (DELTASONE) 5 MG tablet Take 1 tablet (5 mg total) " by mouth once daily.    sulfamethoxazole-trimethoprim 400-80mg (BACTRIM,SEPTRA) 400-80 mg per tablet Take 1 tablet by mouth every Mon, Wed, Fri.    tacrolimus (PROGRAF) 0.5 MG Cap Take 2 capsules (1 mg total) by mouth every 12 (twelve) hours.     Family History     Problem Relation (Age of Onset)    Diabetes Father, Brother    Hypertension Mother    Kidney disease Sister        Tobacco Use    Smoking status: Never Smoker    Smokeless tobacco: Never Used   Substance and Sexual Activity    Alcohol use: No    Drug use: No    Sexual activity: Not on file     Review of Systems   Constitutional: Negative for chills, diaphoresis and fever.   HENT: Positive for congestion (baseline). Negative for rhinorrhea, sinus pain, sore throat and voice change.    Eyes: Negative for discharge and redness.   Respiratory: Positive for cough (dry). Negative for shortness of breath and wheezing.    Cardiovascular: Negative for chest pain and leg swelling.   Gastrointestinal: Negative for abdominal distention, abdominal pain, diarrhea, nausea and vomiting.   Endocrine: Negative for polydipsia and polyuria.   Genitourinary: Negative for dysuria, frequency and urgency.   Musculoskeletal: Negative for back pain, myalgias, neck pain and neck stiffness.   Skin: Negative for color change and pallor.   Allergic/Immunologic: Positive for immunocompromised state.   Neurological: Negative for dizziness, light-headedness and headaches.   Hematological: Negative for adenopathy. Does not bruise/bleed easily.   Psychiatric/Behavioral: Negative for agitation, confusion and decreased concentration.     Objective:     Vital Signs (Most Recent):  Temp: 98.4 °F (36.9 °C) (04/22/19 1320)  Pulse: 96 (04/22/19 1320)  Resp: 17 (04/22/19 1320)  BP: (!) 157/85 (04/22/19 1320)  SpO2: 96 % (04/22/19 1320) Vital Signs (24h Range):  Temp:  [98.4 °F (36.9 °C)] 98.4 °F (36.9 °C)  Pulse:  [96] 96  Resp:  [17] 17  SpO2:  [96 %] 96 %  BP: (157)/(85) 157/85         There is no height or weight on file to calculate BMI.    No intake or output data in the 24 hours ending 04/22/19 1357    Physical Exam   Constitutional: He is oriented to person, place, and time. He appears well-developed and well-nourished. No distress.   HENT:   Head: Normocephalic and atraumatic.   Nose: Nose normal.   Eyes: Conjunctivae and EOM are normal. No scleral icterus.   Neck: Normal range of motion. Neck supple. No JVD present.   Cardiovascular: Normal rate, regular rhythm and normal heart sounds. Exam reveals no gallop and no friction rub.   No murmur heard.  Pulmonary/Chest: Effort normal. No respiratory distress. He has no wheezes. He has no rhonchi. He has no rales.   Abdominal: Soft. Bowel sounds are normal. He exhibits no distension. There is no tenderness.   Musculoskeletal: Normal range of motion. He exhibits no edema or deformity.   Neurological: He is alert and oriented to person, place, and time.   Skin: Skin is warm and dry. He is not diaphoretic.   Right chest vásquez c/d/i   Psychiatric: He has a normal mood and affect. His behavior is normal.   Nursing note and vitals reviewed.      Significant Labs:  CBC:  No results for input(s): WBC, RBC, HGB, HCT, PLT, MCV, MCH, MCHC in the last 72 hours.  BMP:  No results for input(s): GLUCOSE, NA, K, CL, CO2, BUN, CREATININE, CALCIUM in the last 72 hours.     I have reviewed all pertinent labs within the past 24 hours.    Diagnostic Results:  Labs: Reviewed

## 2019-04-22 NOTE — H&P
[facility]  Lung Transplant  H&P    Patient Name: Martin Hendrix Jr.  MRN: [id number]  Admission Date: 4/22/2019  Attending Physician: Samantha Bill DO  Primary Care Provider: Sukhi Dunham Jr, MD     Subjective:     History of Present Illness:  Mr. Hendrix is a 58 year old male s/p BLT in 8/22/17 with history of recurrent CMV viremia who presents today as a direct admission for administration of foscarnet. Patient states he is doing well from a respiratory standpoint. He reports occasional dry cough and congestion which is unchanged from his baseline. He otherwise denies fevers, chills, night sweats, abdominal pain, sputum production, hemoptysis, chest pain, dyspnea, recent sick contacts. He is followed closely by Dr. Lowry for his CMV viremia. He is compliant with all of his medications.    Past Medical History:   Diagnosis Date    AVILA (acute kidney injury) 8/27/2017    Atrial fibrillation 8/23/2017    On home oxygen therapy     KRISTYN on CPAP     Osteopenia     Pancreatitis 2009    hospitalized    Pulmonary hypertension     Pure hypercholesterolemia 11/15/2017    Sarcoidosis     Shortness of breath     Type 2 diabetes mellitus 11/5/2017       Past Surgical History:   Procedure Laterality Date    ABDOMINAL SURGERY      6 weeks old    BRONCHOSCOPY      CHEST TUBE INSERTION      CHOLECYSTECTOMY      COLONOSCOPY      EGD (ESOPHAGOGASTRODUODENOSCOPY) N/A 8/6/2018    Performed by Ramu Eisenberg MD at [facility] ENDO (2ND FLR)    flexible bronchoscopy with tissue biopsy N/A 3/21/2018    Performed by [facility] Diagnostic Provider at [facility] OR 2ND FLR    flexible bronchoscopy with tissue biopsy CPT 47861 N/A 8/22/2018    Performed by [facility] Diagnostic Provider at [facility] OR 2ND FLR    flexible bronchoscopy with tissue biopsy CPT 65007 N/A 4/19/2018    Performed by [facility] Diagnostic Provider at [facility] OR 2ND FLR    flexible bronchoscopy with tissue biopsy CPT 83518 N/A 2/21/2018    Performed by [facility]  Diagnostic Provider at [facility] OR 2ND FLR    flexible bronchoscopy with tissue biopsy CPT 59340 N/A 12/20/2017    Performed by [facility] Diagnostic Provider at [facility] OR 2ND FLR    flexible bronchoscopy with tissue biopsy CPT 16780 N/A 11/22/2017    Performed by [facility] Diagnostic Provider at [facility] OR 2ND FLR    flexible bronchoscopy with tissue biopsy CPT 07634 N/A 11/2/2017    Performed by [facility] Diagnostic Provider at [facility] OR 2ND FLR    flexible bronchoscopy with tissue biopsy CPT 11111 N/A 10/4/2017    Performed by [facility] Diagnostic Provider at [facility] OR 2ND FLR    flexible bronchoscopy with tissue biopy CPT 46718 N/A 11/20/2018    [facility]    HEART CATH-RIGHT Right 5/22/2017    Performed by Shanell Higuera MD at [facility] CATH LAB    INSERTION, CATHETER, CENTRAL VENOUS, HEMODIALYSIS, TUNNELED N/A 3/13/2019    Performed by Edy Gonzalez MD at [facility] CATH LAB    LUNG BIOPSY      LUNG TRANSPLANT  08/2017    PH MONITORING, ESOPHAGUS, WIRELESS, (OFF REFLUX MEDS) N/A 8/6/2018    Performed by Ramu Eisenberg MD at [facility] ENDO (2ND FLR)    TRANSPLANT-LUNG Bilateral 8/22/2017    Performed by Paulo German MD at [facility] OR [facility] [place]       Review of patient's allergies indicates:  No Known Allergies    PTA Medications   Medication Sig    amLODIPine (NORVASC) 5 MG tablet Take 1 tablet (5 mg total) by mouth once daily.    apixaban (ELIQUIS) 2.5 mg Tab Take 1 tablet (2.5 mg total) by mouth 2 (two) times daily.    calcium-vitamin D tablet 600 mg-200 units Take 1 tablet by mouth 2 (two) times daily.    ECOTRIN LOW STRENGTH 81 mg EC tablet TAKE 1 TABLET DAILY    famotidine (PEPCID) 20 MG tablet TAKE 1 TABLET TWICE A DAY    fluticasone (FLONASE) 50 mcg/actuation nasal spray 1 spray by Each Nare route once daily.    folic acid (FOLVITE) 1 MG tablet TAKE 1 TABLET DAILY    insulin degludec (TRESIBA FLEXTOUCH U-200) 200 unit/mL (3 mL) InPn Inject 13 Units into the skin every evening.  "   insulin lispro (HUMALOG KWIKPEN INSULIN) 100 unit/mL pen Inject 7 Units into the skin 3 (three) times daily before meals.    lancets Misc To check BG 4 times daily, to use with insurance preferred meter    magnesium chloride (MAG 64) 64 mg TbEC Take 2 tablets (128 mg total) by mouth once daily.    magnesium oxide (MAG-OX) 400 mg (241.3 mg magnesium) tablet Take 1 tablet (400 mg total) by mouth 2 (two) times daily as needed.    ONETOUCH VERIO Strp USE TO CHECK BLOOD GLUCOSE FOUR TIMES A DAY, TO USE WITH INSURANCE PREFERRED METER    pen needle, diabetic (BD ULTRA-FINE JIM PEN NEEDLE) 32 gauge x 5/32" Ndle Uses 4 times daily, on multiple daily insulin injections    pravastatin (PRAVACHOL) 20 MG tablet TAKE 1 TABLET BY MOUTH ONCE DAILY    predniSONE (DELTASONE) 5 MG tablet Take 1 tablet (5 mg total) by mouth once daily.    sulfamethoxazole-trimethoprim 400-80mg (BACTRIM,SEPTRA) 400-80 mg per tablet Take 1 tablet by mouth every Mon, Wed, Fri.    tacrolimus (PROGRAF) 0.5 MG Cap Take 2 capsules (1 mg total) by mouth every 12 (twelve) hours.     Family History     Problem Relation (Age of Onset)    Diabetes Father, Brother    Hypertension Mother    Kidney disease Sister        Tobacco Use    Smoking status: Never Smoker    Smokeless tobacco: Never Used   Substance and Sexual Activity    Alcohol use: No    Drug use: No    Sexual activity: Not on file     Review of Systems   Constitutional: Negative for chills, diaphoresis and fever.   HENT: Positive for congestion (baseline). Negative for rhinorrhea, sinus pain, sore throat and voice change.    Eyes: Negative for discharge and redness.   Respiratory: Positive for cough (dry). Negative for shortness of breath and wheezing.    Cardiovascular: Negative for chest pain and leg swelling.   Gastrointestinal: Negative for abdominal distention, abdominal pain, diarrhea, nausea and vomiting.   Endocrine: Negative for polydipsia and polyuria.   Genitourinary: " Negative for dysuria, frequency and urgency.   Musculoskeletal: Negative for back pain, myalgias, neck pain and neck stiffness.   Skin: Negative for color change and pallor.   Allergic/Immunologic: Positive for immunocompromised state.   Neurological: Negative for dizziness, light-headedness and headaches.   Hematological: Negative for adenopathy. Does not bruise/bleed easily.   Psychiatric/Behavioral: Negative for agitation, confusion and decreased concentration.     Objective:     Vital Signs (Most Recent):  Temp: 98.4 °F (36.9 °C) (04/22/19 1320)  Pulse: 96 (04/22/19 1320)  Resp: 17 (04/22/19 1320)  BP: (!) 157/85 (04/22/19 1320)  SpO2: 96 % (04/22/19 1320) Vital Signs (24h Range):  Temp:  [98.4 °F (36.9 °C)] 98.4 °F (36.9 °C)  Pulse:  [96] 96  Resp:  [17] 17  SpO2:  [96 %] 96 %  BP: (157)/(85) 157/85        There is no height or weight on file to calculate BMI.    No intake or output data in the 24 hours ending 04/22/19 1357    Physical Exam   Constitutional: He is oriented to person, place, and time. He appears well-developed and well-nourished. No distress.   HENT:   Head: Normocephalic and atraumatic.   Nose: Nose normal.   Eyes: Conjunctivae and EOM are normal. No scleral icterus.   Neck: Normal range of motion. Neck supple. No JVD present.   Cardiovascular: Normal rate, regular rhythm and normal heart sounds. Exam reveals no gallop and no friction rub.   No murmur heard.  Pulmonary/Chest: Effort normal. No respiratory distress. He has no wheezes. He has no rhonchi. He has no rales.   Abdominal: Soft. Bowel sounds are normal. He exhibits no distension. There is no tenderness.   Musculoskeletal: Normal range of motion. He exhibits no edema or deformity.   Neurological: He is alert and oriented to person, place, and time.   Skin: Skin is warm and dry. He is not diaphoretic.   Right chest vásquez c/d/i   Psychiatric: He has a normal mood and affect. His behavior is normal.   Nursing note and vitals  reviewed.      Significant Labs:  CBC:  No results for input(s): WBC, RBC, HGB, HCT, PLT, MCV, MCH, MCHC in the last 72 hours.  BMP:  No results for input(s): GLUCOSE, NA, K, CL, CO2, BUN, CREATININE, CALCIUM in the last 72 hours.     I have reviewed all pertinent labs within the past 24 hours.    Diagnostic Results:  Labs: Reviewed    Assessment/Plan:     * Cytomegalovirus (CMV) viremia  Last CMV PCR 3870. Will administer Foscarnet while inpatient. Monitor electrolytes. ID following, appreciate recs.     Lung replaced by transplant  Underwent BLT 8/22/2017 for sarcoidosis. Continue immunosuppression and prophylaxis.     Immunosuppression  Continue tacrolimus and prednisone. Will monitor tacrolimus levels while inpatient and adjust dose as needed.     Prophylactic antibiotic  Continue bactrim.     Type 2 diabetes mellitus with hyperglycemia, with long-term current use of insulin  Endocrine consulted, appreciate recs.         Teresa Trejo PA-C  Lung Transplant  [facility]

## 2019-04-22 NOTE — ASSESSMENT & PLAN NOTE
Continue tacrolimus and prednisone. Will monitor tacrolimus levels while inpatient and adjust dose as needed.

## 2019-04-22 NOTE — HPI
Mr. Hendrix is a 58 year old male s/p BLT in 8/22/17 with history of recurrent CMV viremia who presents today as a direct admission for administration of foscarnet. Patient states he is doing well from a respiratory standpoint. He reports occasional dry cough and congestion which is unchanged from his baseline. He otherwise denies fevers, chills, night sweats, abdominal pain, sputum production, hemoptysis, chest pain, dyspnea, recent sick contacts. He is followed closely by Dr. Lowry for his CMV viremia. He is compliant with all of his medications.

## 2019-04-22 NOTE — SUBJECTIVE & OBJECTIVE
Interval HPI:   Overnight events:   Patient now admitted to TSU. BG is significantly above goal.     Eatin% - endorses eating cheese burger and fries prior to direct admission to hospital.     Nausea: No  Hypoglycemia and intervention: No  Fever: No  TPN and/or TF: No  If yes, type of TF/TPN and rate: None    PMH, PSH, FH, SH  reviewed     Review of Systems  Constitutional: Negative for weight changes.  Eyes: Negative for visual disturbance.  Respiratory: Negative for cough.   Cardiovascular: Negative for chest pain.  Gastrointestinal: Negative for nausea.  Endocrine: Negative for polyuria. Positive for polydipsia.  Musculoskeletal: Negative for back pain.  Skin: Negative for rash.  Neurological: Negative for syncope.  Psychiatric/Behavioral: Negative for depression.    Current Medications and/or Treatments Impacting Glycemic Control  Immunotherapy:    Immunosuppressants         Stop Route Frequency     tacrolimus capsule 1 mg      -- Oral 2 times daily        Steroids:   Hormones (From admission, onward)    Start     Stop Route Frequency Ordered    19 1415  predniSONE tablet 5 mg      -- Oral Daily 19 1308        Pressors:    Autonomic Drugs (From admission, onward)    None        Hyperglycemia/Diabetes Medications:   Antihyperglycemics (From admission, onward)    Start     Stop Route Frequency Ordered    19 1500  insulin aspart U-100 pen 0-5 Units      -- SubQ Before meals & nightly PRN 19 1400             PHYSICAL EXAMINATION:  Vitals:    19 1600   BP: 135/87   Pulse: 84   Resp: (!) 21   Temp:      Body mass index is 31.87 kg/m².    Physical Exam   Constitutional: Well developed, well nourished, NAD.  ENT:  normal hearing.  Neck: Supple; trachea midline;   Cardiovascular: Normal heart sounds, no LE edema. DP +2 bilaterally.  Lungs: Normal effort; lungs anterior bilaterally clear to auscultation.  Abdomen: Soft, no masses, no hernias.  MS: No clubbing or cyanosis of nails  noted;  Skin: No rashes, lesions, or ulcers; no nodules. Injection sites are ok. No lipo hypertropthy or atrophy.  Psychiatric: Good judgement and insight; normal mood and affect.  Neurological: Cranial nerves are grossly intact.  Foot: nails in good condition, no amputations noted.

## 2019-04-22 NOTE — CONSULTS
[facility]  Endocrinology  Diabetes Consult Note    Consult Requested by: Samantha Bill DO   Reason for admit: Cytomegalovirus (CMV) viremia    HISTORY OF PRESENT ILLNESS:    Reason for Consult: Management of T2DM, Hyperglycemia     Reason for Consult: Management of type 2 DM, Hyperglycemia      Surgical Procedure and Date: 2017 - Bilateral Lung Transplant     Diabetes diagnosis year:      Home Diabetes Medications:     Tresiba 13 units HS and humalog 7 units with meals and correctionscale.    How often checking glucose at home? BID   BG readings on regimen: <170  Hypoglycemia on the regimen?  no  Missed doses on regimen?  None     Diabetes Complications include:     Hyperglycemia     Complicating diabetes co morbidities:   KRISTYN and Glucocorticoid use , A-fib, AVILA, Pancreatitis, Osteopenia     HPI:   Patient is a 58 y.o. male with a diagnosis of type 2 DM on MDI. Also with sarcoidosis (s/p BLT), KRISTYN, osteopenia, and Afib. Patient presents today as a direct admission for administration of foscarnet on 2019. Endocrinology consulted to manage DM2/Hyperglycemia.    Lab Results   Component Value Date    HGBA1C 10.9 (H) 2019                   Interval HPI:   Overnight events:   Patient now admitted to TSU. BG is significantly above goal.     Eatin% - endorses eating cheese burger and fries prior to direct admission to hospital.     Nausea: No  Hypoglycemia and intervention: No  Fever: No  TPN and/or TF: No  If yes, type of TF/TPN and rate: None    PMH, PSH, FH, SH  reviewed     Review of Systems  Constitutional: Negative for weight changes.  Eyes: Negative for visual disturbance.  Respiratory: Negative for cough.   Cardiovascular: Negative for chest pain.  Gastrointestinal: Negative for nausea.  Endocrine: Negative for polyuria. Positive for polydipsia.  Musculoskeletal: Negative for back pain.  Skin: Negative for rash.  Neurological: Negative for  syncope.  Psychiatric/Behavioral: Negative for depression.    Current Medications and/or Treatments Impacting Glycemic Control  Immunotherapy:    Immunosuppressants         Stop Route Frequency     tacrolimus capsule 1 mg      -- Oral 2 times daily        Steroids:   Hormones (From admission, onward)    Start     Stop Route Frequency Ordered    04/22/19 1415  predniSONE tablet 5 mg      -- Oral Daily 04/22/19 1308        Pressors:    Autonomic Drugs (From admission, onward)    None        Hyperglycemia/Diabetes Medications:   Antihyperglycemics (From admission, onward)    Start     Stop Route Frequency Ordered    04/22/19 1500  insulin aspart U-100 pen 0-5 Units      -- SubQ Before meals & nightly PRN 04/22/19 1400             PHYSICAL EXAMINATION:  Vitals:    04/22/19 1600   BP: 135/87   Pulse: 84   Resp: (!) 21   Temp:      Body mass index is 31.87 kg/m².    Physical Exam   Constitutional: Well developed, well nourished, NAD.  ENT:  normal hearing.  Neck: Supple; trachea midline;   Cardiovascular: Normal heart sounds, no LE edema. DP +2 bilaterally.  Lungs: Normal effort; lungs anterior bilaterally clear to auscultation.  Abdomen: Soft, no masses, no hernias.  MS: No clubbing or cyanosis of nails noted;  Skin: No rashes, lesions, or ulcers; no nodules. Injection sites are ok. No lipo hypertropthy or atrophy.  Psychiatric: Good judgement and insight; normal mood and affect.  Neurological: Cranial nerves are grossly intact.  Foot: nails in good condition, no amputations noted.        Labs Reviewed and Include   Recent Labs   Lab 04/22/19  1507   *   CALCIUM 9.3   ALBUMIN 3.3*   PROT 7.4   *   K 4.1   CO2 29   CL 98   BUN 23*   CREATININE 1.8*   ALKPHOS 91   ALT 20   AST 21   BILITOT 0.3     Lab Results   Component Value Date    WBC 6.43 04/22/2019    HGB 9.8 (L) 04/22/2019    HCT 29.6 (L) 04/22/2019    MCV 87 04/22/2019     04/22/2019     No results for input(s): TSH, FREET4 in the last 168  "hours.  Lab Results   Component Value Date    HGBA1C 10.9 (H) 02/12/2019       Nutritional status:   Body mass index is 31.87 kg/m².  Lab Results   Component Value Date    ALBUMIN 3.3 (L) 04/22/2019    ALBUMIN 3.4 (L) 04/18/2019    ALBUMIN 3.3 (L) 04/12/2019     No results found for: PREALBUMIN    Estimated Creatinine Clearance: 56.2 mL/min (A) (based on SCr of 1.8 mg/dL (H)).    Accu-Checks  Recent Labs     04/22/19  1701   POCTGLUCOSE 348*        ASSESSMENT and PLAN    * Cytomegalovirus (CMV) viremia  Managed per primary team  Avoid hypoglycemia        Type 2 diabetes mellitus with hyperglycemia, with long-term current use of insulin  BG goal 140 - 180    BG is significantly above goal at time of consult. Patient endorses noncompliance with ADA diet. He states, " I knew I was going to be here for a while, so I ate a big hamburger, and fires before I came."     Start Levemir 10 units HS. First dose tonight. 20% reduction from home dose (aprox 0.2 units/kg).   Start Novolog 5 units TIDWM with Low Dose SQ Insulin Correction Scale.  BG monitoring AC/HS.     ** Please notify Endocrine for any change and/or advance in diet**  ** Please call Endocrine for any BG related issues **    Discharge Recommendations:    Patient has follow-up/intitial appointment with [facility] on 04/24/2019. Patient is to continue current home MDI regimen, keep BG logs, and keep scheduled appointment.         Adrenal cortical steroids causing adverse effect in therapeutic use  On steroid therapy per transplant team; may elevate BG readings        Immunosuppression  On immunosepresive therapy per transplant team; may elevate BG readings        Class 1 obesity due to excess calories with serious comorbidity and body mass index (BMI) of 30.0 to 30.9 in adult  may contribute to insulin resistance  Body mass index is 31.87 kg/m².          Plan discussed with patient at bedside.     Delroy Vergara NP  Endocrinology  [facility] [place]

## 2019-04-22 NOTE — ASSESSMENT & PLAN NOTE
Last CMV PCR 3870. Will administer Foscarnet while inpatient. Monitor electrolytes. ID following, appreciate recs.

## 2019-04-22 NOTE — ASSESSMENT & PLAN NOTE
"BG goal 140 - 180    BG is significantly above goal at time of consult. Patient endorses noncompliance with ADA diet. He states, " I knew I was going to be here for a while, so I ate a big hamburger, and fires before I came."     Start Levemir 10 units HS. First dose tonight. 20% reduction from home dose (aprox 0.2 units/kg).   Start Novolog 5 units TIDWM with Low Dose SQ Insulin Correction Scale.  BG monitoring AC/HS.     ** Please notify Endocrine for any change and/or advance in diet**  ** Please call Endocrine for any BG related issues **    Discharge Recommendations:    Patient has follow-up/intitial appointment with [facility] on 04/24/2019. Patient is to continue current home MDI regimen, keep BG logs, and keep scheduled appointment.       "

## 2019-04-23 LAB
ALBUMIN SERPL BCP-MCNC: 2.9 G/DL (ref 3.5–5.2)
ALP SERPL-CCNC: 76 U/L (ref 55–135)
ALT SERPL W/O P-5'-P-CCNC: 18 U/L (ref 10–44)
ANION GAP SERPL CALC-SCNC: 5 MMOL/L (ref 8–16)
AST SERPL-CCNC: 18 U/L (ref 10–40)
BASOPHILS # BLD AUTO: 0.04 K/UL (ref 0–0.2)
BASOPHILS NFR BLD: 1 % (ref 0–1.9)
BILIRUB SERPL-MCNC: 0.3 MG/DL (ref 0.1–1)
BUN SERPL-MCNC: 19 MG/DL (ref 6–20)
CALCIUM SERPL-MCNC: 8.7 MG/DL (ref 8.7–10.5)
CHLORIDE SERPL-SCNC: 104 MMOL/L (ref 95–110)
CO2 SERPL-SCNC: 28 MMOL/L (ref 23–29)
CREAT SERPL-MCNC: 1.4 MG/DL (ref 0.5–1.4)
DIFFERENTIAL METHOD: ABNORMAL
EOSINOPHIL # BLD AUTO: 0.3 K/UL (ref 0–0.5)
EOSINOPHIL NFR BLD: 7.9 % (ref 0–8)
ERYTHROCYTE [DISTWIDTH] IN BLOOD BY AUTOMATED COUNT: 12.8 % (ref 11.5–14.5)
EST. GFR  (AFRICAN AMERICAN): >60 ML/MIN/1.73 M^2
EST. GFR  (NON AFRICAN AMERICAN): 55.8 ML/MIN/1.73 M^2
GLUCOSE SERPL-MCNC: 141 MG/DL (ref 70–110)
HCT VFR BLD AUTO: 27.9 % (ref 40–54)
HGB BLD-MCNC: 9.3 G/DL (ref 14–18)
IMM GRANULOCYTES # BLD AUTO: 0.03 K/UL (ref 0–0.04)
IMM GRANULOCYTES NFR BLD AUTO: 0.7 % (ref 0–0.5)
LYMPHOCYTES # BLD AUTO: 1.1 K/UL (ref 1–4.8)
LYMPHOCYTES NFR BLD: 26.7 % (ref 18–48)
MAGNESIUM SERPL-MCNC: 1.2 MG/DL (ref 1.6–2.6)
MCH RBC QN AUTO: 29.1 PG (ref 27–31)
MCHC RBC AUTO-ENTMCNC: 33.3 G/DL (ref 32–36)
MCV RBC AUTO: 87 FL (ref 82–98)
MONOCYTES # BLD AUTO: 0.7 K/UL (ref 0.3–1)
MONOCYTES NFR BLD: 16.3 % (ref 4–15)
NEUTROPHILS # BLD AUTO: 1.9 K/UL (ref 1.8–7.7)
NEUTROPHILS NFR BLD: 47.4 % (ref 38–73)
NRBC BLD-RTO: 0 /100 WBC
PLATELET # BLD AUTO: 219 K/UL (ref 150–350)
PMV BLD AUTO: 8.8 FL (ref 9.2–12.9)
POCT GLUCOSE: 187 MG/DL (ref 70–110)
POCT GLUCOSE: 193 MG/DL (ref 70–110)
POCT GLUCOSE: 211 MG/DL (ref 70–110)
POCT GLUCOSE: 280 MG/DL (ref 70–110)
POTASSIUM SERPL-SCNC: 3.6 MMOL/L (ref 3.5–5.1)
PROT SERPL-MCNC: 6.6 G/DL (ref 6–8.4)
RBC # BLD AUTO: 3.2 M/UL (ref 4.6–6.2)
SODIUM SERPL-SCNC: 137 MMOL/L (ref 136–145)
TACROLIMUS BLD-MCNC: 5.7 NG/ML (ref 5–15)
WBC # BLD AUTO: 4.05 K/UL (ref 3.9–12.7)

## 2019-04-23 PROCEDURE — 63600175 PHARM REV CODE 636 W HCPCS: Performed by: NURSE PRACTITIONER

## 2019-04-23 PROCEDURE — 25000242 PHARM REV CODE 250 ALT 637 W/ HCPCS: Performed by: PHYSICIAN ASSISTANT

## 2019-04-23 PROCEDURE — 99255 IP/OBS CONSLTJ NEW/EST HI 80: CPT | Mod: ,,, | Performed by: INTERNAL MEDICINE

## 2019-04-23 PROCEDURE — 25000003 PHARM REV CODE 250: Performed by: PHYSICIAN ASSISTANT

## 2019-04-23 PROCEDURE — 63600175 PHARM REV CODE 636 W HCPCS: Performed by: PHYSICIAN ASSISTANT

## 2019-04-23 PROCEDURE — S5571 INSULIN DISPOS PEN 3 ML: HCPCS | Performed by: NURSE PRACTITIONER

## 2019-04-23 PROCEDURE — 80053 COMPREHEN METABOLIC PANEL: CPT

## 2019-04-23 PROCEDURE — 85025 COMPLETE CBC W/AUTO DIFF WBC: CPT

## 2019-04-23 PROCEDURE — 99255 PR INITIAL INPATIENT CONSULT,LEVL V: ICD-10-PCS | Mod: ,,, | Performed by: INTERNAL MEDICINE

## 2019-04-23 PROCEDURE — 83735 ASSAY OF MAGNESIUM: CPT

## 2019-04-23 PROCEDURE — 80197 ASSAY OF TACROLIMUS: CPT

## 2019-04-23 PROCEDURE — 25000003 PHARM REV CODE 250: Performed by: INTERNAL MEDICINE

## 2019-04-23 PROCEDURE — 99232 PR SUBSEQUENT HOSPITAL CARE,LEVL II: ICD-10-PCS | Mod: ,,, | Performed by: INTERNAL MEDICINE

## 2019-04-23 PROCEDURE — 20600001 HC STEP DOWN PRIVATE ROOM

## 2019-04-23 PROCEDURE — 99232 SBSQ HOSP IP/OBS MODERATE 35: CPT | Mod: ,,, | Performed by: INTERNAL MEDICINE

## 2019-04-23 PROCEDURE — 63600175 PHARM REV CODE 636 W HCPCS: Mod: JG | Performed by: INTERNAL MEDICINE

## 2019-04-23 RX ORDER — MAGNESIUM SULFATE HEPTAHYDRATE 40 MG/ML
2 INJECTION, SOLUTION INTRAVENOUS
Status: DISCONTINUED | OUTPATIENT
Start: 2019-04-23 | End: 2019-04-25 | Stop reason: HOSPADM

## 2019-04-23 RX ADMIN — APIXABAN 2.5 MG: 2.5 TABLET, FILM COATED ORAL at 08:04

## 2019-04-23 RX ADMIN — INSULIN DETEMIR 12 UNITS: 100 INJECTION, SOLUTION SUBCUTANEOUS at 08:04

## 2019-04-23 RX ADMIN — FAMOTIDINE 20 MG: 20 TABLET, FILM COATED ORAL at 08:04

## 2019-04-23 RX ADMIN — MAGNESIUM SULFATE IN WATER 2 G: 40 INJECTION, SOLUTION INTRAVENOUS at 11:04

## 2019-04-23 RX ADMIN — TACROLIMUS 1 MG: 1 CAPSULE ORAL at 08:04

## 2019-04-23 RX ADMIN — SODIUM CHLORIDE 500 ML: 0.9 INJECTION, SOLUTION INTRAVENOUS at 04:04

## 2019-04-23 RX ADMIN — AMLODIPINE BESYLATE 5 MG: 5 TABLET ORAL at 08:04

## 2019-04-23 RX ADMIN — INSULIN ASPART 1 UNITS: 100 INJECTION, SOLUTION INTRAVENOUS; SUBCUTANEOUS at 08:04

## 2019-04-23 RX ADMIN — INSULIN ASPART 5 UNITS: 100 INJECTION, SOLUTION INTRAVENOUS; SUBCUTANEOUS at 05:04

## 2019-04-23 RX ADMIN — MAGNESIUM 64 MG (MAGNESIUM CHLORIDE) TABLET,DELAYED RELEASE 128 MG: at 08:04

## 2019-04-23 RX ADMIN — ASPIRIN 81 MG: 81 TABLET, COATED ORAL at 08:04

## 2019-04-23 RX ADMIN — FLUTICASONE PROPIONATE 50 MCG: 50 SPRAY, METERED NASAL at 08:04

## 2019-04-23 RX ADMIN — FOSCARNET SODIUM 5004 MG: 24 INJECTION, SOLUTION INTRAVENOUS at 05:04

## 2019-04-23 RX ADMIN — FOLIC ACID 1000 MCG: 1 TABLET ORAL at 08:04

## 2019-04-23 RX ADMIN — MAGNESIUM SULFATE IN WATER 2 G: 40 INJECTION, SOLUTION INTRAVENOUS at 09:04

## 2019-04-23 RX ADMIN — INSULIN ASPART 2 UNITS: 100 INJECTION, SOLUTION INTRAVENOUS; SUBCUTANEOUS at 05:04

## 2019-04-23 RX ADMIN — PREDNISONE 5 MG: 5 TABLET ORAL at 08:04

## 2019-04-23 RX ADMIN — INSULIN ASPART 5 UNITS: 100 INJECTION, SOLUTION INTRAVENOUS; SUBCUTANEOUS at 08:04

## 2019-04-23 RX ADMIN — PRAVASTATIN SODIUM 20 MG: 20 TABLET ORAL at 08:04

## 2019-04-23 RX ADMIN — INSULIN ASPART 5 UNITS: 100 INJECTION, SOLUTION INTRAVENOUS; SUBCUTANEOUS at 11:04

## 2019-04-23 RX ADMIN — TACROLIMUS 1 MG: 1 CAPSULE ORAL at 05:04

## 2019-04-23 NOTE — CARE UPDATE
BG goal 140-180 while inpatient. BG above goal overnight with scheduled insulin and correction scale coverage. BG improving but remains slightly above goal so far this shift.       Increase Levemir 12 units HS.   Continue novolog 5 units with meals.   BG monitoring ac/hs and low dose correction scale.         ** Please call Endocrine for any BG related issues **

## 2019-04-23 NOTE — HPI
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  Patient was admitted 2/12/2019-2/15/2019 for initiation of foscarnet therapy. 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  CMV viral load was being monitored qweekly - most recently VL in 3000-4000s.  Patient is otherwise asymptomatic - no fevers, chills, night sweats, SOB, cough, CP, n/v/d, abdominal pain, hematuria, dysuria.

## 2019-04-23 NOTE — PROGRESS NOTES
"Admit Note:    Met with patient to assess needs. Patient is a 58 y.o.  male, admitted for Cytomegalovirus (CMV) viremia [B25.9]. Pt is s/p lung txp from 8/22/17.      Patient admitted from home on 4/22/2019 .  At this time, patient presents as alert and oriented x 4, pleasant, good eye contact, well groomed, recall good, concentration/judgement good, average intelligence, calm, communicative, cooperative and asking and answering questions appropriately.  At this time, patients caregiver presents as not present in room.    Household/Family Systems     Patient resides with patient's self and mother, at [address]  [facility] [address].  Support system includes pt's mother Susy, daughter Sybil, brother Williams, and sister Albina.  Patient does have dependents that are in need of being cared for. Patient's mother is being cared for my pt's brother Woodrow. Pt's mother suffers from "sundowners"      Patients primary caregiver is Sybil Teixeira, patients daughter, phone number [phone].  Confirmed patients contact information is [phone] (home);   Telephone Information:   Mobile [phone]   .  During admission, patient's caregiver plans to stay at home.  Confirmed patient and patients caregivers do have access to reliable transportation.    Cognitive Status/Learning     Patient reports reading ability as 12th grade and states patient does have difficulty with seeing and and wears glasses.  Patient reports patient learns best by Written material/Verbal explanation/Demonstration/ Hands on practice.     Needed: No.   Highest education level: High School (9-12) or GED    Vocation/Disability   .  Working for Income: yes  Pt currently working full time as a planner.    Patient was disabled due to Sarcoidosis in 2/2017.  Pt resumed working post transplant    Adherence     Patient reports a high level of adherence to patients health care regimen.  Adherence counseling and education " provided. Patient verbalizes understanding.    Substance Use    Patient reports the following substance usage.    Tobacco: none, patient denies any use.  Alcohol: none, patient denies any use.  Illicit Drugs/Non-prescribed Medications: none, patient denies any use.  Patient states clear understanding of the potential impact of substance use.  Substance abstinence/cessation counseling, education and resources provided and reviewed.     Services Utilizing/ADLS    Infusion Service: Prior to admission, patient utilizing? yes pt reports using [facility] for IV antibiotics and dressing changes.  Home Health: Prior to admission, patient utilizing? no  DME: Prior to admission, yes Pt reports having an IV pole. Prior to txp pt had O2 supplies through [facility]  Pulmonary/Cardiac Rehab: Prior to admission, no  Dialysis:  Prior to admission, no  Transplant Specialty Pharmacy:  Prior to admission, yes:      [id number] - [place], [place] - [address] [place] [address] AT [facility]   [facility] [address]  Phone: [phone] Fax: [phone]    [facility] [place] [id number] [facility]  [address] [facility] [address]  Phone: [phone] Fax: [phone]    [facility] [address] [place] [address]  Phone: [phone] Fax: [phone]      Prior to admission, patient reports patient was independent with ADLS and was driving.  Patient reports patient is able to care for self at this time.  Patient indicates a willingness to care for self once medically cleared to do so.    Insurance/Medications    Insured by   Payor/Plan Subscr  Sex Relation Sub. Ins. ID Effective Group Num   1. AET[employer] - AETNA* JAYE BORDEN* 1960 Male  [id number]                                   [address]   2. AET[employer] - AETNA* MICHIJAYE ROCHA* 1960 Male  [id number] 9[birth year] [id number]                                    [address]   Primary Insurance (for UNOS reporting): Private Insurance  Secondary Insurance (for UNOS reporting): None    Patient reports patient is able to obtain and afford medications at this time and at time of discharge.    Living Will/Healthcare Power of     Patient states patient does not have a LW and/or HCPA.   provided education regarding LW and HCPA and the completion of forms.    Coping/Mental Health    Patient is coping adequately with the aid of  family members.  Patient denies mental health difficulties.     Discharge Planning    At time of discharge, patient plans to return to patient's home under the care of self.  Patient will transport self.  Per rounds today, expected discharge date has not been medically determined at this time. Patient and patients caregiver  verbalize understanding and are involved in treatment planning and discharge process.      Additional Concerns    Patient is being followed for needs, education, resources, information, emotional support, supportive counseling, and for supportive and skilled discharge plan of care.  providing ongoing psychosocial support, education, resources and d/c planning as needed.  SW remains available.  remains available. Patient denies additional needs and/or concerns at this time. Patient verbalizes understanding and agreement with information reviewed, social work availability, and how to access available resources as needed.

## 2019-04-23 NOTE — PLAN OF CARE
Problem: Adult Inpatient Plan of Care  Goal: Plan of Care Review  Outcome: Ongoing (interventions implemented as appropriate)  Pt is AAOx4, no complaints of pain, SOB, or n/v.   BG recorded ACHS; mealtime insulin administered; no SSI required.  Mg replaced IV; 4g given.    NS bolus administered prior to foscarnet dose.  Pt turns self in bed independently no signs of skin breakdown noted.   Fall precautions in place, side rails upx3, non slip footwear applied, pt instructed to call for assistance before attempting to ambulate, pt verbalized understanding. Call bell within reach, family present at bedside.

## 2019-04-23 NOTE — PLAN OF CARE
Problem: Adult Inpatient Plan of Care  Goal: Plan of Care Review  Outcome: Ongoing (interventions implemented as appropriate)  Pt is AAOx4 in bed wearing non-skid footwear, bed in low/locked position and with call bell within reach. Pt reminded to use call bell to call for assistance, pt verbalizes understanding. Pt is afebrile at this time. Proper hand hygiene performed before and after pt care activities. Bedtime BG of 247, scheduled Levemir insulin as well as sliding scale Novolog coverage administered. Denies any pain or discomfort at this time.

## 2019-04-23 NOTE — SUBJECTIVE & OBJECTIVE
Subjective:     Interval History: No acute events overnight.  Tolerated Foscarnet without difficulty    Continuous Infusions:  Scheduled Meds:   amLODIPine  5 mg Oral Daily    apixaban  2.5 mg Oral BID    aspirin  81 mg Oral Daily    famotidine  20 mg Oral BID    fluticasone  1 spray Each Nare Daily    folic acid  1,000 mcg Oral Daily    foscarnet (FOSCAVIR) IVPB (peripheral)  60 mg/kg (Order-Specific) Intravenous Q24H    insulin aspart U-100  5 Units Subcutaneous TIDWM    insulin detemir U-100  10 Units Subcutaneous QHS    magnesium chloride  128 mg Oral Daily    pravastatin  20 mg Oral Daily    predniSONE  5 mg Oral Daily    sodium chloride 0.9%  500 mL Intravenous Daily    sulfamethoxazole-trimethoprim 400-80mg  1 tablet Oral Every Mon, Wed, Fri    tacrolimus  1 mg Oral BID     PRN Meds:dextrose 50%, dextrose 50%, glucagon (human recombinant), glucose, glucose, insulin aspart U-100, magnesium oxide, magnesium sulfate IVPB **AND** magnesium sulfate IVPB    Review of patient's allergies indicates:  No Known Allergies    Review of Systems   Constitutional: Negative for chills, diaphoresis and fever.   HENT: Positive for congestion (baseline). Negative for rhinorrhea, sinus pain, sore throat and voice change.    Eyes: Negative for discharge and redness.   Respiratory: Positive for cough (dry). Negative for shortness of breath and wheezing.    Cardiovascular: Negative for chest pain and leg swelling.   Gastrointestinal: Negative for abdominal distention, abdominal pain, diarrhea, nausea and vomiting.   Endocrine: Negative for polydipsia and polyuria.   Genitourinary: Negative for dysuria, frequency and urgency.   Musculoskeletal: Negative for back pain, myalgias, neck pain and neck stiffness.   Skin: Negative for color change and pallor.   Allergic/Immunologic: Positive for immunocompromised state.   Neurological: Negative for dizziness, light-headedness and headaches.   Hematological: Negative for  adenopathy. Does not bruise/bleed easily.   Psychiatric/Behavioral: Negative for agitation, confusion and decreased concentration.     Objective:   Physical Exam   Constitutional: He is oriented to person, place, and time. He appears well-developed and well-nourished. No distress.   HENT:   Head: Normocephalic and atraumatic.   Nose: Nose normal.   Eyes: Conjunctivae and EOM are normal. No scleral icterus.   Neck: Normal range of motion. Neck supple. No JVD present.   Cardiovascular: Normal rate, regular rhythm and normal heart sounds. Exam reveals no gallop and no friction rub.   No murmur heard.  Pulmonary/Chest: Effort normal. No respiratory distress. He has no wheezes. He has no rhonchi. He has no rales.   Abdominal: Soft. Bowel sounds are normal. He exhibits no distension. There is no tenderness.   Musculoskeletal: Normal range of motion. He exhibits no edema or deformity.   Neurological: He is alert and oriented to person, place, and time.   Skin: Skin is warm and dry. He is not diaphoretic.   Right chest vásquez c/d/i   Psychiatric: He has a normal mood and affect. His behavior is normal.   Nursing note and vitals reviewed.        Vital Signs (Most Recent):  Temp: 98.5 °F (36.9 °C) (04/23/19 0748)  Pulse: 92 (04/23/19 0748)  Resp: 20 (04/23/19 0748)  BP: (!) 144/90 (04/23/19 0748)  SpO2: 100 % (04/23/19 0748) Vital Signs (24h Range):  Temp:  [98.1 °F (36.7 °C)-98.5 °F (36.9 °C)] 98.5 °F (36.9 °C)  Pulse:  [71-96] 92  Resp:  [16-21] 20  SpO2:  [95 %-100 %] 100 %  BP: (135-157)/(85-95) 144/90     Weight: 103.7 kg (228 lb 8.1 oz)  Body mass index is 31.87 kg/m².      Intake/Output Summary (Last 24 hours) at 4/23/2019 1048  Last data filed at 4/23/2019 0500  Gross per 24 hour   Intake 1880 ml   Output 2000 ml   Net -120 ml       Significant Labs:  CBC:  Recent Labs   Lab 04/23/19  0500   WBC 4.05   RBC 3.20*   HGB 9.3*   HCT 27.9*      MCV 87   MCH 29.1   MCHC 33.3     BMP:  Recent Labs   Lab  04/23/19  0500      K 3.6      CO2 28   BUN 19   CREATININE 1.4   CALCIUM 8.7      Tacrolimus Levels:  Recent Labs   Lab 04/23/19  0500   TACROLIMUS 5.7     Microbiology:  Microbiology Results (last 7 days)     ** No results found for the last 168 hours. **          I have reviewed all pertinent labs within the past 24 hours.

## 2019-04-23 NOTE — SUBJECTIVE & OBJECTIVE
Past Medical History:   Diagnosis Date    AVILA (acute kidney injury) 8/27/2017    Atrial fibrillation 8/23/2017    Hypertension     On home oxygen therapy     KRISTYN on CPAP     Osteopenia     Pancreatitis 2009    hospitalized    Pulmonary hypertension     Pure hypercholesterolemia 11/15/2017    Sarcoidosis     Shortness of breath     Type 2 diabetes mellitus 11/5/2017       Past Surgical History:   Procedure Laterality Date    ABDOMINAL SURGERY      6 weeks old    BRONCHOSCOPY      CHEST TUBE INSERTION      CHOLECYSTECTOMY      COLONOSCOPY      EGD (ESOPHAGOGASTRODUODENOSCOPY) N/A 8/6/2018    Performed by Ramu Eisenberg MD at [facility] ENDO (2ND FLR)    flexible bronchoscopy with tissue biopsy N/A 3/21/2018    Performed by [facility] at [facility] OR 2ND FLR    flexible bronchoscopy with tissue biopsy CPT 82900 N/A 8/22/2018    Performed by [facility] at [facility] OR 2ND FLR    flexible bronchoscopy with tissue biopsy CPT 46089 N/A 4/19/2018    Performed by [facility] at [facility] OR 2ND FLR    flexible bronchoscopy with tissue biopsy CPT 70082 N/A 2/21/2018    Performed by [facility] at [facility] OR 2ND FLR    flexible bronchoscopy with tissue biopsy CPT 14702 N/A 12/20/2017    Performed by [facility] at [facility] OR 2ND FLR    flexible bronchoscopy with tissue biopsy CPT 14229 N/A 11/22/2017    Performed by [facility] at [facility] 2ND FLR    flexible bronchoscopy with tissue biopsy CPT 86924 N/A 11/2/2017    Performed by [facility] at [facility] OR 2ND FLR    flexible bronchoscopy with tissue biopsy CPT 78195 N/A 10/4/2017    Performed by [facility] at [facility] OR 2ND FLR    flexible bronchoscopy with tissue biopy CPT 36332 N/A 11/20/2018    Performed by [facility] at [facility] OR 2ND FLR    HEART CATH-RIGHT Right 5/22/2017    Performed by Shanell Higuera MD at [facility] CATH LAB    INSERTION, CATHETER, CENTRAL  "VENOUS, HEMODIALYSIS, TUNNELED N/A 3/13/2019    Performed by [facility]y [facility] at [facility] CATH LAB    LUNG BIOPSY      LUNG TRANSPLANT  08/2017    PH MONITORING, ESOPHAGUS, WIRELESS, (OFF REFLUX MEDS) N/A 8/6/2018    Performed by [facility]u [facility]berg MD at [facility] ENDO (2ND FLR)    TONSILLECTOMY      TRANSPLANT-LUNG Bilateral 8/22/2017    Performed by Paulo German MD at [facility] OR 2ND FLR       Review of patient's allergies indicates:  No Known Allergies    Medications:  Medications Prior to Admission   Medication Sig    amLODIPine (NORVASC) 5 MG tablet Take 1 tablet (5 mg total) by mouth once daily.    apixaban (ELIQUIS) 2.5 mg Tab Take 1 tablet (2.5 mg total) by mouth 2 (two) times daily.    calcium-vitamin D tablet 600 mg-200 units Take 1 tablet by mouth 2 (two) times daily.    ECOTRIN LOW STRENGTH 81 mg EC tablet TAKE 1 TABLET DAILY    famotidine (PEPCID) 20 MG tablet TAKE 1 TABLET TWICE A DAY    fluticasone (FLONASE) 50 mcg/actuation nasal spray 1 spray by Each Nare route once daily.    folic acid (FOLVITE) 1 MG tablet TAKE 1 TABLET DAILY    insulin degludec (TRESIBA FLEXTOUCH U-200) 200 unit/mL (3 mL) InPn Inject 13 Units into the skin every evening.    insulin lispro (HUMALOG KWIKPEN INSULIN) 100 unit/mL pen Inject 7 Units into the skin 3 (three) times daily before meals.    lancets Misc To check BG 4 times daily, to use with insurance preferred meter    magnesium chloride (MAG 64) 64 mg TbEC Take 2 tablets (128 mg total) by mouth once daily.    magnesium oxide (MAG-OX) 400 mg (241.3 mg magnesium) tablet Take 1 tablet (400 mg total) by mouth 2 (two) times daily as needed.    ONETOUCH VERIO Strp USE TO CHECK BLOOD GLUCOSE FOUR TIMES A DAY, TO USE WITH INSURANCE PREFERRED METER    pen needle, diabetic (BD ULTRA-FINE JIM PEN NEEDLE) 32 gauge x 5/32" Ndle Uses 4 times daily, on multiple daily insulin injections    pravastatin (PRAVACHOL) 20 MG tablet TAKE 1 TABLET BY MOUTH ONCE DAILY    " predniSONE (DELTASONE) 5 MG tablet Take 1 tablet (5 mg total) by mouth once daily.    sulfamethoxazole-trimethoprim 400-80mg (BACTRIM,SEPTRA) 400-80 mg per tablet Take 1 tablet by mouth every Mon, Wed, Fri.    tacrolimus (PROGRAF) 0.5 MG Cap Take 2 capsules (1 mg total) by mouth every 12 (twelve) hours.     Antibiotics (From admission, onward)    Start     Stop Route Frequency Ordered    04/22/19 1700  sulfamethoxazole-trimethoprim 400-80mg per tablet 1 tablet      -- Oral Every Mon, Wed, Fri 04/22/19 1308        Antifungals (From admission, onward)    None        Antivirals (From admission, onward)        Stop Route Frequency     foscarnet (FOSCAVIR) IVPB (central line)      -- IV Every 12 hours (non-standard times)           Immunization History   Administered Date(s) Administered    Hepatitis A, Adult 04/26/2017, 02/07/2018    Hepatitis B, Dialysis, 3 Dose 04/26/2017, 08/10/2017    Pneumococcal Polysaccharide - 23 Valent 02/07/2018       Family History     Problem Relation (Age of Onset)    Diabetes Father, Brother    Hypertension Mother    Kidney disease Sister        Social History     Socioeconomic History    Marital status:      Spouse name: Not on file    Number of children: Not on file    Years of education: Not on file    Highest education level: Not on file   Occupational History    Not on file   Social Needs    Financial resource strain: Not on file    Food insecurity:     Worry: Not on file     Inability: Not on file    Transportation needs:     Medical: Not on file     Non-medical: Not on file   Tobacco Use    Smoking status: Never Smoker    Smokeless tobacco: Never Used   Substance and Sexual Activity    Alcohol use: No    Drug use: No    Sexual activity: Not on file   Lifestyle    Physical activity:     Days per week: Not on file     Minutes per session: Not on file    Stress: Not on file   Relationships    Social connections:     Talks on phone: Not on file     Gets  together: Not on file     Attends Worship service: Not on file     Active member of club or organization: Not on file     Attends meetings of clubs or organizations: Not on file     Relationship status: Not on file   Other Topics Concern    Not on file   Social History Narrative    Not on file     Review of Systems   Constitutional: Negative for chills, diaphoresis and fever.   HENT: Negative for rhinorrhea and sore throat.    Respiratory: Negative for cough and shortness of breath.    Cardiovascular: Negative for chest pain and leg swelling.   Gastrointestinal: Negative for abdominal pain, diarrhea, nausea and vomiting.   Genitourinary: Negative for dysuria and hematuria.   Musculoskeletal: Negative for arthralgias and myalgias.   Skin: Negative for rash.   Neurological: Negative for headaches.     Objective:     Vital Signs (Most Recent):  Temp: 97.6 °F (36.4 °C) (04/23/19 1518)  Pulse: 82 (04/23/19 1115)  Resp: 18 (04/23/19 1115)  BP: (!) 142/90 (04/23/19 1115)  SpO2: 96 % (04/23/19 1115) Vital Signs (24h Range):  Temp:  [97.6 °F (36.4 °C)-98.5 °F (36.9 °C)] 97.6 °F (36.4 °C)  Pulse:  [71-92] 82  Resp:  [16-21] 18  SpO2:  [95 %-100 %] 96 %  BP: (135-147)/(85-95) 142/90     Weight: 103.7 kg (228 lb 8.1 oz)  Body mass index is 31.87 kg/m².    Estimated Creatinine Clearance: 72.3 mL/min (based on SCr of 1.4 mg/dL).    Physical Exam   Constitutional: He is oriented to person, place, and time. He appears well-developed and well-nourished. No distress.   HENT:   Head: Normocephalic and atraumatic.   Eyes: Conjunctivae and EOM are normal.   Neck: Normal range of motion. Neck supple.   Cardiovascular: Normal rate, regular rhythm and normal heart sounds.   Pulmonary/Chest: Effort normal and breath sounds normal. No respiratory distress. He has no wheezes. He has no rales.   Abdominal: Soft. He exhibits no distension.   Musculoskeletal: Normal range of motion. He exhibits no edema.   Neurological: He is alert and  oriented to person, place, and time.   Skin: Skin is warm and dry. No rash noted. He is not diaphoretic. No erythema.   Psychiatric: He has a normal mood and affect. His behavior is normal.   Vitals reviewed.      Significant Labs: All pertinent labs within the past 24 hours have been reviewed.    Significant Imaging: I have reviewed all pertinent imaging results/findings within the past 24 hours.

## 2019-04-23 NOTE — PROGRESS NOTES
[facility] [address]  Lung Transplant  Progress Note - Floor    Patient Name: Martin Hendrix Jr.  MRN: [id number]  Admission Date: 4/22/2019  Hospital Length of Stay: 1 days  Post-Operative Day: 609  Attending Physician: Samantha Bill DO  Primary Care Provider: Sukhi Dunham Jr, MD     Subjective:     Interval History: No acute events overnight.  Tolerated Foscarnet without difficulty    Continuous Infusions:  Scheduled Meds:   amLODIPine  5 mg Oral Daily    apixaban  2.5 mg Oral BID    aspirin  81 mg Oral Daily    famotidine  20 mg Oral BID    fluticasone  1 spray Each Nare Daily    folic acid  1,000 mcg Oral Daily    foscarnet (FOSCAVIR) IVPB (peripheral)  60 mg/kg (Order-Specific) Intravenous Q24H    insulin aspart U-100  5 Units Subcutaneous TIDWM    insulin detemir U-100  10 Units Subcutaneous QHS    magnesium chloride  128 mg Oral Daily    pravastatin  20 mg Oral Daily    predniSONE  5 mg Oral Daily    sodium chloride 0.9%  500 mL Intravenous Daily    sulfamethoxazole-trimethoprim 400-80mg  1 tablet Oral Every Mon, Wed, Fri    tacrolimus  1 mg Oral BID     PRN Meds:dextrose 50%, dextrose 50%, glucagon (human recombinant), glucose, glucose, insulin aspart U-100, magnesium oxide, magnesium sulfate IVPB **AND** magnesium sulfate IVPB    Review of patient's allergies indicates:  No Known Allergies    Review of Systems   Constitutional: Negative for chills, diaphoresis and fever.   HENT: Positive for congestion (baseline). Negative for rhinorrhea, sinus pain, sore throat and voice change.    Eyes: Negative for discharge and redness.   Respiratory: Positive for cough (dry). Negative for shortness of breath and wheezing.    Cardiovascular: Negative for chest pain and leg swelling.   Gastrointestinal: Negative for abdominal distention, abdominal pain, diarrhea, nausea and vomiting.   Endocrine: Negative for polydipsia and polyuria.   Genitourinary: Negative for dysuria, frequency and  urgency.   Musculoskeletal: Negative for back pain, myalgias, neck pain and neck stiffness.   Skin: Negative for color change and pallor.   Allergic/Immunologic: Positive for immunocompromised state.   Neurological: Negative for dizziness, light-headedness and headaches.   Hematological: Negative for adenopathy. Does not bruise/bleed easily.   Psychiatric/Behavioral: Negative for agitation, confusion and decreased concentration.     Objective:   Physical Exam   Constitutional: He is oriented to person, place, and time. He appears well-developed and well-nourished. No distress.   HENT:   Head: Normocephalic and atraumatic.   Nose: Nose normal.   Eyes: Conjunctivae and EOM are normal. No scleral icterus.   Neck: Normal range of motion. Neck supple. No JVD present.   Cardiovascular: Normal rate, regular rhythm and normal heart sounds. Exam reveals no gallop and no friction rub.   No murmur heard.  Pulmonary/Chest: Effort normal. No respiratory distress. He has no wheezes. He has no rhonchi. He has no rales.   Abdominal: Soft. Bowel sounds are normal. He exhibits no distension. There is no tenderness.   Musculoskeletal: Normal range of motion. He exhibits no edema or deformity.   Neurological: He is alert and oriented to person, place, and time.   Skin: Skin is warm and dry. He is not diaphoretic.   Right chest vásquez c/d/i   Psychiatric: He has a normal mood and affect. His behavior is normal.   Nursing note and vitals reviewed.        Vital Signs (Most Recent):  Temp: 98.5 °F (36.9 °C) (04/23/19 0748)  Pulse: 92 (04/23/19 0748)  Resp: 20 (04/23/19 0748)  BP: (!) 144/90 (04/23/19 0748)  SpO2: 100 % (04/23/19 0748) Vital Signs (24h Range):  Temp:  [98.1 °F (36.7 °C)-98.5 °F (36.9 °C)] 98.5 °F (36.9 °C)  Pulse:  [71-96] 92  Resp:  [16-21] 20  SpO2:  [95 %-100 %] 100 %  BP: (135-157)/(85-95) 144/90     Weight: 103.7 kg (228 lb 8.1 oz)  Body mass index is 31.87 kg/m².      Intake/Output Summary (Last 24 hours) at  4/23/2019 1048  Last data filed at 4/23/2019 0500  Gross per 24 hour   Intake 1880 ml   Output 2000 ml   Net -120 ml       Significant Labs:  CBC:  Recent Labs   Lab 04/23/19  0500   WBC 4.05   RBC 3.20*   HGB 9.3*   HCT 27.9*      MCV 87   MCH 29.1   MCHC 33.3     BMP:  Recent Labs   Lab 04/23/19  0500      K 3.6      CO2 28   BUN 19   CREATININE 1.4   CALCIUM 8.7      Tacrolimus Levels:  Recent Labs   Lab 04/23/19  0500   TACROLIMUS 5.7     Microbiology:  Microbiology Results (last 7 days)     ** No results found for the last 168 hours. **          I have reviewed all pertinent labs within the past 24 hours.        Assessment/Plan:     * Cytomegalovirus (CMV) viremia  Last CMV PCR 3870. Will administer Foscarnet while inpatient. Monitor electrolytes. ID following, appreciate recs. Will speak with ID regarding possible home infusion.    Lung replaced by transplant  Underwent BLT 8/22/2017 for sarcoidosis. Continue immunosuppression and prophylaxis.     Immunosuppression  Continue tacrolimus and prednisone. Will monitor tacrolimus levels while inpatient and adjust dose as needed.     Prophylactic antibiotic  Continue bactrim.     Type 2 diabetes mellitus with hyperglycemia, with long-term current use of insulin  Endocrine consulted, appreciate recs.         Irwin Celestin NP  Lung Transplant  [facility]

## 2019-04-23 NOTE — ASSESSMENT & PLAN NOTE
Last CMV PCR 3870. Will administer Foscarnet while inpatient. Monitor electrolytes. ID following, appreciate recs. Will speak with ID regarding possible home infusion.

## 2019-04-23 NOTE — CONSULTS
Consult received. Full note to follow.    Please call for questions.    Thanks,  Dane Grover MD  Infectious Disease Fellow, PGY-5  Pager: [phone] or [phone]: [phone]  [facility]

## 2019-04-23 NOTE — ASSESSMENT & PLAN NOTE
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     Recommendations:  - Continue induction foscarnet IV dosed per CrCl/kg - at least 14 day cousre  - Hydration with normal saline 500 mL IV prior to foscarnet dosing  - Okay to discharge once a stable foscarnet dosing is established  - qweekly CMV VL, next level due 4/29/2019

## 2019-04-23 NOTE — CONSULTS
[facility]  Infectious Disease  Consult Note    Patient Name: Martin Hendrix Jr.  MRN: [id number]  Admission Date: 4/22/2019  Hospital Length of Stay: 1 days  Attending Physician: Samantha Bill DO  Primary Care Provider: Sukhi Dunham Jr, MD     Isolation Status: No active isolations    Patient information was obtained from patient and past medical records.      Consults  Assessment/Plan:     * Cytomegalovirus (CMV) viremia  58-year-old male with history of sarcoidosis (c/b pulmonary HTN and ESLD; s/p BOLT 8/22/2017, CMV D+/R-, basiliximab induction, on maintenance tacro/MMF/pred; c/b left vocal cord dysfunction, prolonged ACR-2 s/p pulse SM in 10/2017 and thymo x3 in 3/2018), steroid-induced DM2, CKD stage 3, CMV infection 8/2018 c/b UL-97 resistance enrolled in maribavir treatment study with subsequent marabivir failure who presents for recurrent CMV infection.     Recommendations:  - Continue induction foscarnet IV dosed per CrCl/kg - at least 14 day cousre  - Hydration with normal saline 500 mL IV prior to foscarnet dosing  - Okay to discharge once a stable foscarnet dosing is established  - qweekly CMV VL, next level due 4/29/2019             Thank you for your consult. I will follow-up with patient. Please contact us if you have any additional questions.    Yue Lowry MD  Infectious Disease  [facility]    Subjective:     Principal Problem: Cytomegalovirus (CMV) viremia    HPI: 57yo man w/a history of sarcoidosis (c/b pulmonary HTN and ESLD; s/p BOLT 8/22/2017, CMV D+/R-, basiliximab induction, on maintenance tacro/MMF/pred; c/b left vocal cord dysfunction, prolonged ACR-2 s/p pulse SM in 10/2017 and thymo x3 in 3/2018, CMV infection 8/2018 c/b UL-97 resistance enrolled in maribavir treatment study with subsequent marabivir failure who presents for recurrent ganciclovir-resistant CMV infection.  Patient was admitted 2/12/2019-2/15/2019 for initiation of  foscarnet therapy. 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  CMV viral load was being monitored qweekly - most recently VL in 3000-4000s.  Patient is otherwise asymptomatic - no fevers, chills, night sweats, SOB, cough, CP, n/v/d, abdominal pain, hematuria, dysuria.      Past Medical History:   Diagnosis Date    AVILA (acute kidney injury) 8/27/2017    Atrial fibrillation 8/23/2017    Hypertension     On home oxygen therapy     KRISTYN on CPAP     Osteopenia     Pancreatitis 2009    hospitalized    Pulmonary hypertension     Pure hypercholesterolemia 11/15/2017    Sarcoidosis     Shortness of breath     Type 2 diabetes mellitus 11/5/2017       Past Surgical History:   Procedure Laterality Date    ABDOMINAL SURGERY      6 weeks old    BRONCHOSCOPY      CHEST TUBE INSERTION      CHOLECYSTECTOMY      COLONOSCOPY      EGD (ESOPHAGOGASTRODUODENOSCOPY) N/A 8/6/2018    Performed by Ramu Eisenberg MD at [facility] ENDO (2ND FLR)    flexible bronchoscopy with tissue biopsy N/A 3/21/2018    Performed by [facility] Diagnostic Provider at [facility] OR 2ND FLR    flexible bronchoscopy with tissue biopsy CPT 21226 N/A 8/22/2018    Performed by [facility] Diagnostic Provider at [facility] OR 2ND FLR    flexible bronchoscopy with tissue biopsy CPT 22703 N/A 4/19/2018    Performed by [facility] Diagnostic Provider at [facility] OR 2ND FLR    flexible bronchoscopy with tissue biopsy CPT 94447 N/A 2/21/2018    Performed by [facility] Diagnostic Provider at [facility] OR 2ND FLR    flexible bronchoscopy with tissue biopsy CPT 56176 N/A 12/20/2017    Performed by [facility] Diagnostic Provider at [facility] OR 2ND FLR    flexible bronchoscopy with tissue biopsy CPT 42534 N/A 11/22/2017    Performed by [facility] Diagnostic Provider at [facility] OR 2ND FLR    flexible bronchoscopy with tissue biopsy CPT 88185 N/A 11/2/2017    Performed by [facility] Diagnostic Provider at [facility] OR 2ND FLR    flexible bronchoscopy with tissue biopsy CPT 12268 N/A  10/4/2017    Performed by [facility] Diagnostic Provider at [facility] OR 2ND FLR    flexible bronchoscopy with tissue biopy CPT 49493 N/A 11/20/2018    [facility]    HEART CATH-RIGHT Right 5/22/2017    Performed by Shanell Higuera MD at [facility] CATH LAB    INSERTION, CATHETER, CENTRAL VENOUS, HEMODIALYSIS, TUNNELED N/A 3/13/2019    Performed by Edy Gonzalez MD at [facility] CATH LAB    LUNG BIOPSY      LUNG TRANSPLANT  08/2017    PH MONITORING, ESOPHAGUS, WIRELESS, (OFF REFLUX MEDS) N/A 8/6/2018    Performed by Ramu Eisenberg MD at [facility] ENDO (2ND FLR)    TONSILLECTOMY      TRANSPLANT-LUNG Bilateral 8/22/2017    Performed by Paulo German MD at [facility] OR [address] [place] [address]       Review of patient's allergies indicates:  No Known Allergies    Medications:  Medications Prior to Admission   Medication Sig    amLODIPine (NORVASC) 5 MG tablet Take 1 tablet (5 mg total) by mouth once daily.    apixaban (ELIQUIS) 2.5 mg Tab Take 1 tablet (2.5 mg total) by mouth 2 (two) times daily.    calcium-vitamin D tablet 600 mg-200 units Take 1 tablet by mouth 2 (two) times daily.    ECOTRIN LOW STRENGTH 81 mg EC tablet TAKE 1 TABLET DAILY    famotidine (PEPCID) 20 MG tablet TAKE 1 TABLET TWICE A DAY    fluticasone (FLONASE) 50 mcg/actuation nasal spray 1 spray by Each Nare route once daily.    folic acid (FOLVITE) 1 MG tablet TAKE 1 TABLET DAILY    insulin degludec (TRESIBA FLEXTOUCH U-200) 200 unit/mL (3 mL) InPn Inject 13 Units into the skin every evening.    insulin lispro (HUMALOG KWIKPEN INSULIN) 100 unit/mL pen Inject 7 Units into the skin 3 (three) times daily before meals.    lancets Misc To check BG 4 times daily, to use with insurance preferred meter    magnesium chloride (MAG 64) 64 mg TbEC Take 2 tablets (128 mg total) by mouth once daily.    magnesium oxide (MAG-OX) 400 mg (241.3 mg magnesium) tablet Take 1 tablet (400 mg total) by mouth 2 (two) times daily as needed.     "ONETOUCH VERIO Strp USE TO CHECK BLOOD GLUCOSE FOUR TIMES A DAY, TO USE WITH INSURANCE PREFERRED METER    pen needle, diabetic (BD ULTRA-FINE JIM PEN NEEDLE) 32 gauge x 5/32" Ndle Uses 4 times daily, on multiple daily insulin injections    pravastatin (PRAVACHOL) 20 MG tablet TAKE 1 TABLET BY MOUTH ONCE DAILY    predniSONE (DELTASONE) 5 MG tablet Take 1 tablet (5 mg total) by mouth once daily.    sulfamethoxazole-trimethoprim 400-80mg (BACTRIM,SEPTRA) 400-80 mg per tablet Take 1 tablet by mouth every Mon, Wed, Fri.    tacrolimus (PROGRAF) 0.5 MG Cap Take 2 capsules (1 mg total) by mouth every 12 (twelve) hours.     Antibiotics (From admission, onward)    Start     Stop Route Frequency Ordered    04/22/19 1700  sulfamethoxazole-trimethoprim 400-80mg per tablet 1 tablet      -- Oral Every Mon, Wed, Fri 04/22/19 1308        Antifungals (From admission, onward)    None        Antivirals (From admission, onward)        Stop Route Frequency     foscarnet (FOSCAVIR) IVPB (central line)      -- IV Every 12 hours (non-standard times)           Immunization History   Administered Date(s) Administered    Hepatitis A, Adult 04/26/2017, 02/07/2018    Hepatitis B, Dialysis, 3 Dose 04/26/2017, 08/10/2017    Pneumococcal Polysaccharide - 23 Valent 02/07/2018       Family History     Problem Relation (Age of Onset)    Diabetes Father, Brother    Hypertension Mother    Kidney disease Sister        Social History     Socioeconomic History    Marital status:      Spouse name: Not on file    Number of children: Not on file    Years of education: Not on file    Highest education level: Not on file   Occupational History    Not on file   Social Needs    Financial resource strain: Not on file    Food insecurity:     Worry: Not on file     Inability: Not on file    Transportation needs:     Medical: Not on file     Non-medical: Not on file   Tobacco Use    Smoking status: Never Smoker    Smokeless tobacco: Never " Used   Substance and Sexual Activity    Alcohol use: No    Drug use: No    Sexual activity: Not on file   Lifestyle    Physical activity:     Days per week: Not on file     Minutes per session: Not on file    Stress: Not on file   Relationships    Social connections:     Talks on phone: Not on file     Gets together: Not on file     Attends Jew service: Not on file     Active member of club or organization: Not on file     Attends meetings of clubs or organizations: Not on file     Relationship status: Not on file   Other Topics Concern    Not on file   Social History Narrative    Not on file     Review of Systems   Constitutional: Negative for chills, diaphoresis and fever.   HENT: Negative for rhinorrhea and sore throat.    Respiratory: Negative for cough and shortness of breath.    Cardiovascular: Negative for chest pain and leg swelling.   Gastrointestinal: Negative for abdominal pain, diarrhea, nausea and vomiting.   Genitourinary: Negative for dysuria and hematuria.   Musculoskeletal: Negative for arthralgias and myalgias.   Skin: Negative for rash.   Neurological: Negative for headaches.     Objective:     Vital Signs (Most Recent):  Temp: 97.6 °F (36.4 °C) (04/23/19 1518)  Pulse: 82 (04/23/19 1115)  Resp: 18 (04/23/19 1115)  BP: (!) 142/90 (04/23/19 1115)  SpO2: 96 % (04/23/19 1115) Vital Signs (24h Range):  Temp:  [97.6 °F (36.4 °C)-98.5 °F (36.9 °C)] 97.6 °F (36.4 °C)  Pulse:  [71-92] 82  Resp:  [16-21] 18  SpO2:  [95 %-100 %] 96 %  BP: (135-147)/(85-95) 142/90     Weight: 103.7 kg (228 lb 8.1 oz)  Body mass index is 31.87 kg/m².    Estimated Creatinine Clearance: 72.3 mL/min (based on SCr of 1.4 mg/dL).    Physical Exam   Constitutional: He is oriented to person, place, and time. He appears well-developed and well-nourished. No distress.   HENT:   Head: Normocephalic and atraumatic.   Eyes: Conjunctivae and EOM are normal.   Neck: Normal range of motion. Neck supple.   Cardiovascular: Normal  rate, regular rhythm and normal heart sounds.   Pulmonary/Chest: Effort normal and breath sounds normal. No respiratory distress. He has no wheezes. He has no rales.   Abdominal: Soft. He exhibits no distension.   Musculoskeletal: Normal range of motion. He exhibits no edema.   Neurological: He is alert and oriented to person, place, and time.   Skin: Skin is warm and dry. No rash noted. He is not diaphoretic. No erythema.   Psychiatric: He has a normal mood and affect. His behavior is normal.   Vitals reviewed.      Significant Labs: All pertinent labs within the past 24 hours have been reviewed.    Significant Imaging: I have reviewed all pertinent imaging results/findings within the past 24 hours.

## 2019-04-24 ENCOUNTER — TELEPHONE (OUTPATIENT)
Dept: ENDOCRINOLOGY | Facility: HOSPITAL | Age: 59
End: 2019-04-24

## 2019-04-24 DIAGNOSIS — Z79.4 TYPE 2 DIABETES MELLITUS WITH HYPERGLYCEMIA, WITH LONG-TERM CURRENT USE OF INSULIN: Primary | Chronic | ICD-10-CM

## 2019-04-24 DIAGNOSIS — E11.65 TYPE 2 DIABETES MELLITUS WITH HYPERGLYCEMIA, WITH LONG-TERM CURRENT USE OF INSULIN: Primary | Chronic | ICD-10-CM

## 2019-04-24 LAB
ALBUMIN SERPL BCP-MCNC: 3 G/DL (ref 3.5–5.2)
ALP SERPL-CCNC: 78 U/L (ref 55–135)
ALT SERPL W/O P-5'-P-CCNC: 18 U/L (ref 10–44)
ANION GAP SERPL CALC-SCNC: 5 MMOL/L (ref 8–16)
AST SERPL-CCNC: 20 U/L (ref 10–40)
BASOPHILS # BLD AUTO: 0.06 K/UL (ref 0–0.2)
BASOPHILS NFR BLD: 1.4 % (ref 0–1.9)
BILIRUB SERPL-MCNC: 0.3 MG/DL (ref 0.1–1)
BUN SERPL-MCNC: 16 MG/DL (ref 6–20)
CALCIUM SERPL-MCNC: 8.7 MG/DL (ref 8.7–10.5)
CHLORIDE SERPL-SCNC: 106 MMOL/L (ref 95–110)
CO2 SERPL-SCNC: 27 MMOL/L (ref 23–29)
CREAT SERPL-MCNC: 1.4 MG/DL (ref 0.5–1.4)
DIFFERENTIAL METHOD: ABNORMAL
EOSINOPHIL # BLD AUTO: 0.3 K/UL (ref 0–0.5)
EOSINOPHIL NFR BLD: 7.9 % (ref 0–8)
ERYTHROCYTE [DISTWIDTH] IN BLOOD BY AUTOMATED COUNT: 12.9 % (ref 11.5–14.5)
EST. GFR  (AFRICAN AMERICAN): >60 ML/MIN/1.73 M^2
EST. GFR  (NON AFRICAN AMERICAN): 55.8 ML/MIN/1.73 M^2
GLUCOSE SERPL-MCNC: 131 MG/DL (ref 70–110)
HCT VFR BLD AUTO: 29 % (ref 40–54)
HGB BLD-MCNC: 9.4 G/DL (ref 14–18)
IMM GRANULOCYTES # BLD AUTO: 0.03 K/UL (ref 0–0.04)
IMM GRANULOCYTES NFR BLD AUTO: 0.7 % (ref 0–0.5)
LYMPHOCYTES # BLD AUTO: 1.2 K/UL (ref 1–4.8)
LYMPHOCYTES NFR BLD: 27.4 % (ref 18–48)
MAGNESIUM SERPL-MCNC: 1.7 MG/DL (ref 1.6–2.6)
MCH RBC QN AUTO: 28.7 PG (ref 27–31)
MCHC RBC AUTO-ENTMCNC: 32.4 G/DL (ref 32–36)
MCV RBC AUTO: 89 FL (ref 82–98)
MONOCYTES # BLD AUTO: 0.7 K/UL (ref 0.3–1)
MONOCYTES NFR BLD: 16.7 % (ref 4–15)
NEUTROPHILS # BLD AUTO: 2 K/UL (ref 1.8–7.7)
NEUTROPHILS NFR BLD: 45.9 % (ref 38–73)
NRBC BLD-RTO: 0 /100 WBC
PLATELET # BLD AUTO: 247 K/UL (ref 150–350)
PMV BLD AUTO: 8.8 FL (ref 9.2–12.9)
POCT GLUCOSE: 145 MG/DL (ref 70–110)
POCT GLUCOSE: 153 MG/DL (ref 70–110)
POCT GLUCOSE: 171 MG/DL (ref 70–110)
POCT GLUCOSE: 173 MG/DL (ref 70–110)
POTASSIUM SERPL-SCNC: 3.5 MMOL/L (ref 3.5–5.1)
PROT SERPL-MCNC: 6.8 G/DL (ref 6–8.4)
RBC # BLD AUTO: 3.27 M/UL (ref 4.6–6.2)
SODIUM SERPL-SCNC: 138 MMOL/L (ref 136–145)
TACROLIMUS BLD-MCNC: 5.1 NG/ML (ref 5–15)
WBC # BLD AUTO: 4.3 K/UL (ref 3.9–12.7)

## 2019-04-24 PROCEDURE — 99233 SBSQ HOSP IP/OBS HIGH 50: CPT | Mod: ,,, | Performed by: INTERNAL MEDICINE

## 2019-04-24 PROCEDURE — 99233 PR SUBSEQUENT HOSPITAL CARE,LEVL III: ICD-10-PCS | Mod: ,,, | Performed by: INTERNAL MEDICINE

## 2019-04-24 PROCEDURE — 63600175 PHARM REV CODE 636 W HCPCS: Mod: JG | Performed by: INTERNAL MEDICINE

## 2019-04-24 PROCEDURE — 99232 SBSQ HOSP IP/OBS MODERATE 35: CPT | Mod: ,,, | Performed by: PHYSICIAN ASSISTANT

## 2019-04-24 PROCEDURE — 80053 COMPREHEN METABOLIC PANEL: CPT

## 2019-04-24 PROCEDURE — 80197 ASSAY OF TACROLIMUS: CPT

## 2019-04-24 PROCEDURE — 99232 PR SUBSEQUENT HOSPITAL CARE,LEVL II: ICD-10-PCS | Mod: ,,, | Performed by: NURSE PRACTITIONER

## 2019-04-24 PROCEDURE — 99232 SBSQ HOSP IP/OBS MODERATE 35: CPT | Mod: ,,, | Performed by: NURSE PRACTITIONER

## 2019-04-24 PROCEDURE — 99232 PR SUBSEQUENT HOSPITAL CARE,LEVL II: ICD-10-PCS | Mod: ,,, | Performed by: PHYSICIAN ASSISTANT

## 2019-04-24 PROCEDURE — 63600175 PHARM REV CODE 636 W HCPCS: Performed by: PHYSICIAN ASSISTANT

## 2019-04-24 PROCEDURE — 25000003 PHARM REV CODE 250: Performed by: INTERNAL MEDICINE

## 2019-04-24 PROCEDURE — 20600001 HC STEP DOWN PRIVATE ROOM

## 2019-04-24 PROCEDURE — 25000003 PHARM REV CODE 250: Performed by: PHYSICIAN ASSISTANT

## 2019-04-24 PROCEDURE — 83735 ASSAY OF MAGNESIUM: CPT

## 2019-04-24 PROCEDURE — 85025 COMPLETE CBC W/AUTO DIFF WBC: CPT

## 2019-04-24 RX ADMIN — FAMOTIDINE 20 MG: 20 TABLET, FILM COATED ORAL at 08:04

## 2019-04-24 RX ADMIN — MAGNESIUM 64 MG (MAGNESIUM CHLORIDE) TABLET,DELAYED RELEASE 128 MG: at 08:04

## 2019-04-24 RX ADMIN — SODIUM CHLORIDE 500 ML: 0.9 INJECTION, SOLUTION INTRAVENOUS at 04:04

## 2019-04-24 RX ADMIN — INSULIN ASPART 5 UNITS: 100 INJECTION, SOLUTION INTRAVENOUS; SUBCUTANEOUS at 08:04

## 2019-04-24 RX ADMIN — AMLODIPINE BESYLATE 5 MG: 5 TABLET ORAL at 08:04

## 2019-04-24 RX ADMIN — ASPIRIN 81 MG: 81 TABLET, COATED ORAL at 08:04

## 2019-04-24 RX ADMIN — SULFAMETHOXAZOLE AND TRIMETHOPRIM 1 TABLET: 400; 80 TABLET ORAL at 05:04

## 2019-04-24 RX ADMIN — INSULIN ASPART 5 UNITS: 100 INJECTION, SOLUTION INTRAVENOUS; SUBCUTANEOUS at 05:04

## 2019-04-24 RX ADMIN — FOLIC ACID 1000 MCG: 1 TABLET ORAL at 08:04

## 2019-04-24 RX ADMIN — TACROLIMUS 1 MG: 1 CAPSULE ORAL at 08:04

## 2019-04-24 RX ADMIN — APIXABAN 2.5 MG: 2.5 TABLET, FILM COATED ORAL at 08:04

## 2019-04-24 RX ADMIN — PREDNISONE 5 MG: 5 TABLET ORAL at 08:04

## 2019-04-24 RX ADMIN — INSULIN ASPART 5 UNITS: 100 INJECTION, SOLUTION INTRAVENOUS; SUBCUTANEOUS at 11:04

## 2019-04-24 RX ADMIN — INSULIN DETEMIR 12 UNITS: 100 INJECTION, SOLUTION SUBCUTANEOUS at 08:04

## 2019-04-24 RX ADMIN — FOSCARNET SODIUM 5004 MG: 24 INJECTION, SOLUTION INTRAVENOUS at 05:04

## 2019-04-24 RX ADMIN — TACROLIMUS 1 MG: 1 CAPSULE ORAL at 05:04

## 2019-04-24 RX ADMIN — PRAVASTATIN SODIUM 20 MG: 20 TABLET ORAL at 08:04

## 2019-04-24 RX ADMIN — FLUTICASONE PROPIONATE 50 MCG: 50 SPRAY, METERED NASAL at 08:04

## 2019-04-24 NOTE — PLAN OF CARE
Problem: Adult Inpatient Plan of Care  Goal: Plan of Care Review  Outcome: Ongoing (interventions implemented as appropriate)  -Pt is AAOx4, calm and cooperative, no complaints of pain, SOB, or nausea and vomiting.  -BG recorded ACHS; mealtime insulin administered; no SSI required.  -NS bolus administered prior to foscarnet dose.  -Patient walked the halls today.  -Pt turns self in bed independently no signs of skin breakdown noted.   -Fall precautions in place, non slip footwear applied, pt instructed to call for assistance with any needs, pt verbalized understanding. Call bell within reach.

## 2019-04-24 NOTE — PLAN OF CARE
Problem: Adult Inpatient Plan of Care  Goal: Plan of Care Review  Pt AAOx4, bed low locked, call light within reach, wearing nonskid socks. Pt instructed to use call light for assistance, pt verbalizes understanding.   Pt denies any pain or discomfort at this time. AC/HS, 280, covered with ss insulin. Pt remains free from injury/harm at this time. Afebrile. See flowsheet for full assessment/VS.

## 2019-04-24 NOTE — PROGRESS NOTES
[facility]  Lung Transplant  Progress Note - Floor    Patient Name: Martin Hendrix Jr.  MRN: [id number]  Admission Date: 4/22/2019  Hospital Length of Stay: 2 days  Post-Operative Day: 610  Attending Physician: Samantha Bill DO  Primary Care Provider: Sukhi Dunham Jr, MD     Subjective:     Interval History: No acute events overnight. Complains of some malaise and headache today.     Continuous Infusions:  Scheduled Meds:   amLODIPine  5 mg Oral Daily    apixaban  2.5 mg Oral BID    aspirin  81 mg Oral Daily    famotidine  20 mg Oral BID    fluticasone  1 spray Each Nare Daily    folic acid  1,000 mcg Oral Daily    foscarnet (FOSCAVIR) IVPB (peripheral)  50 mg/kg (Order-Specific) Intravenous Q12H    insulin aspart U-100  5 Units Subcutaneous TIDWM    insulin detemir U-100  12 Units Subcutaneous QHS    magnesium chloride  128 mg Oral Daily    pravastatin  20 mg Oral Daily    predniSONE  5 mg Oral Daily    sodium chloride 0.9%  500 mL Intravenous Q12H    sulfamethoxazole-trimethoprim 400-80mg  1 tablet Oral Every Mon, Wed, Fri    tacrolimus  1 mg Oral BID     PRN Meds:dextrose 50%, dextrose 50%, glucagon (human recombinant), glucose, glucose, insulin aspart U-100, magnesium oxide, magnesium sulfate IVPB **AND** magnesium sulfate IVPB    Review of patient's allergies indicates:  No Known Allergies    Review of Systems   Constitutional: Positive for fatigue. Negative for chills, diaphoresis and fever.   HENT: Positive for congestion (baseline). Negative for rhinorrhea, sinus pain, sore throat and voice change.    Eyes: Negative for discharge and redness.   Respiratory: Positive for cough (dry). Negative for shortness of breath and wheezing.    Cardiovascular: Negative for chest pain and leg swelling.   Gastrointestinal: Negative for abdominal distention, abdominal pain, diarrhea, nausea and vomiting.   Endocrine: Negative for polydipsia and polyuria.   Genitourinary: Negative  for dysuria, frequency and urgency.   Musculoskeletal: Negative for back pain, myalgias, neck pain and neck stiffness.   Skin: Negative for color change and pallor.   Allergic/Immunologic: Positive for immunocompromised state.   Neurological: Positive for headaches. Negative for dizziness, syncope, weakness and light-headedness.   Hematological: Negative for adenopathy. Does not bruise/bleed easily.   Psychiatric/Behavioral: Negative for agitation, confusion and decreased concentration.     Objective:   Physical Exam   Constitutional: He is oriented to person, place, and time. He appears well-developed and well-nourished. No distress.   HENT:   Head: Normocephalic and atraumatic.   Nose: Nose normal.   Eyes: Conjunctivae and EOM are normal. No scleral icterus.   Neck: Normal range of motion. Neck supple. No JVD present.   Cardiovascular: Normal rate, regular rhythm and normal heart sounds. Exam reveals no gallop and no friction rub.   No murmur heard.  Pulmonary/Chest: Effort normal. No respiratory distress. He has no wheezes. He has no rhonchi. He has no rales.   Abdominal: Soft. Bowel sounds are normal. He exhibits no distension. There is no tenderness.   Musculoskeletal: Normal range of motion. He exhibits no edema or deformity.   Neurological: He is alert and oriented to person, place, and time.   Skin: Skin is warm and dry. He is not diaphoretic.   Right chest vásqeuz c/d/i   Psychiatric: He has a normal mood and affect. His behavior is normal.   Nursing note and vitals reviewed.        Vital Signs (Most Recent):  Temp: 97.5 °F (36.4 °C) (04/24/19 1149)  Pulse: 84 (04/24/19 0711)  Resp: 17 (04/24/19 0711)  BP: (!) 156/88 (04/24/19 0711)  SpO2: 99 % (04/24/19 0711) Vital Signs (24h Range):  Temp:  [97.5 °F (36.4 °C)-98.1 °F (36.7 °C)] 97.5 °F (36.4 °C)  Pulse:  [] 84  Resp:  [17-19] 17  SpO2:  [96 %-99 %] 99 %  BP: (118-156)/(74-91) 156/88     Weight: 103.7 kg (228 lb 8.1 oz)  Body mass index is 31.87  kg/m².      Intake/Output Summary (Last 24 hours) at 4/24/2019 1310  Last data filed at 4/24/2019 0515  Gross per 24 hour   Intake 1517 ml   Output 5250 ml   Net -3733 ml       Significant Labs:  CBC:  Recent Labs   Lab 04/24/19  0500   WBC 4.30   RBC 3.27*   HGB 9.4*   HCT 29.0*      MCV 89   MCH 28.7   MCHC 32.4     BMP:  Recent Labs   Lab 04/24/19  0500      K 3.5      CO2 27   BUN 16   CREATININE 1.4   CALCIUM 8.7      Tacrolimus Levels:  Recent Labs   Lab 04/24/19  0500   TACROLIMUS 5.1     Microbiology:  Microbiology Results (last 7 days)     ** No results found for the last 168 hours. **          I have reviewed all pertinent labs within the past 24 hours.        Assessment/Plan:     * Cytomegalovirus (CMV) viremia  Last CMV PCR 3870. Will administer Foscarnet while inpatient. Monitor electrolytes. ID following, appreciate recs. Likely discharge home tomorrow.     Lung replaced by transplant  Underwent BLT 8/22/2017 for sarcoidosis. Continue immunosuppression and prophylaxis.     Immunosuppression  Continue tacrolimus and prednisone. Will monitor tacrolimus levels while inpatient and adjust dose as needed.     Prophylactic antibiotic  Continue bactrim.     Type 2 diabetes mellitus with hyperglycemia, with long-term current use of insulin  Endocrine consulted, appreciate recs.       Teresa Trejo PA-C  Lung Transplant  [facility]

## 2019-04-24 NOTE — SUBJECTIVE & OBJECTIVE
Subjective:     Interval History: No acute events overnight. Complains of some malaise and headache today.     Continuous Infusions:  Scheduled Meds:   amLODIPine  5 mg Oral Daily    apixaban  2.5 mg Oral BID    aspirin  81 mg Oral Daily    famotidine  20 mg Oral BID    fluticasone  1 spray Each Nare Daily    folic acid  1,000 mcg Oral Daily    foscarnet (FOSCAVIR) IVPB (peripheral)  50 mg/kg (Order-Specific) Intravenous Q12H    insulin aspart U-100  5 Units Subcutaneous TIDWM    insulin detemir U-100  12 Units Subcutaneous QHS    magnesium chloride  128 mg Oral Daily    pravastatin  20 mg Oral Daily    predniSONE  5 mg Oral Daily    sodium chloride 0.9%  500 mL Intravenous Q12H    sulfamethoxazole-trimethoprim 400-80mg  1 tablet Oral Every Mon, Wed, Fri    tacrolimus  1 mg Oral BID     PRN Meds:dextrose 50%, dextrose 50%, glucagon (human recombinant), glucose, glucose, insulin aspart U-100, magnesium oxide, magnesium sulfate IVPB **AND** magnesium sulfate IVPB    Review of patient's allergies indicates:  No Known Allergies    Review of Systems   Constitutional: Positive for fatigue. Negative for chills, diaphoresis and fever.   HENT: Positive for congestion (baseline). Negative for rhinorrhea, sinus pain, sore throat and voice change.    Eyes: Negative for discharge and redness.   Respiratory: Positive for cough (dry). Negative for shortness of breath and wheezing.    Cardiovascular: Negative for chest pain and leg swelling.   Gastrointestinal: Negative for abdominal distention, abdominal pain, diarrhea, nausea and vomiting.   Endocrine: Negative for polydipsia and polyuria.   Genitourinary: Negative for dysuria, frequency and urgency.   Musculoskeletal: Negative for back pain, myalgias, neck pain and neck stiffness.   Skin: Negative for color change and pallor.   Allergic/Immunologic: Positive for immunocompromised state.   Neurological: Positive for headaches. Negative for dizziness, syncope,  weakness and light-headedness.   Hematological: Negative for adenopathy. Does not bruise/bleed easily.   Psychiatric/Behavioral: Negative for agitation, confusion and decreased concentration.     Objective:   Physical Exam   Constitutional: He is oriented to person, place, and time. He appears well-developed and well-nourished. No distress.   HENT:   Head: Normocephalic and atraumatic.   Nose: Nose normal.   Eyes: Conjunctivae and EOM are normal. No scleral icterus.   Neck: Normal range of motion. Neck supple. No JVD present.   Cardiovascular: Normal rate, regular rhythm and normal heart sounds. Exam reveals no gallop and no friction rub.   No murmur heard.  Pulmonary/Chest: Effort normal. No respiratory distress. He has no wheezes. He has no rhonchi. He has no rales.   Abdominal: Soft. Bowel sounds are normal. He exhibits no distension. There is no tenderness.   Musculoskeletal: Normal range of motion. He exhibits no edema or deformity.   Neurological: He is alert and oriented to person, place, and time.   Skin: Skin is warm and dry. He is not diaphoretic.   Right chest vásquez c/d/i   Psychiatric: He has a normal mood and affect. His behavior is normal.   Nursing note and vitals reviewed.        Vital Signs (Most Recent):  Temp: 97.5 °F (36.4 °C) (04/24/19 1149)  Pulse: 84 (04/24/19 0711)  Resp: 17 (04/24/19 0711)  BP: (!) 156/88 (04/24/19 0711)  SpO2: 99 % (04/24/19 0711) Vital Signs (24h Range):  Temp:  [97.5 °F (36.4 °C)-98.1 °F (36.7 °C)] 97.5 °F (36.4 °C)  Pulse:  [] 84  Resp:  [17-19] 17  SpO2:  [96 %-99 %] 99 %  BP: (118-156)/(74-91) 156/88     Weight: 103.7 kg (228 lb 8.1 oz)  Body mass index is 31.87 kg/m².      Intake/Output Summary (Last 24 hours) at 4/24/2019 1310  Last data filed at 4/24/2019 0515  Gross per 24 hour   Intake 1517 ml   Output 5250 ml   Net -3733 ml       Significant Labs:  CBC:  Recent Labs   Lab 04/24/19  0500   WBC 4.30   RBC 3.27*   HGB 9.4*   HCT 29.0*      MCV 89    MCH 28.7   MCHC 32.4     BMP:  Recent Labs   Lab 04/24/19  0500      K 3.5      CO2 27   BUN 16   CREATININE 1.4   CALCIUM 8.7      Tacrolimus Levels:  Recent Labs   Lab 04/24/19  0500   TACROLIMUS 5.1     Microbiology:  Microbiology Results (last 7 days)     ** No results found for the last 168 hours. **          I have reviewed all pertinent labs within the past 24 hours.

## 2019-04-24 NOTE — ASSESSMENT & PLAN NOTE
BG goal 140 - 180    BG is significantly above goal at time of consult. Patient endorses noncompliance with ADA diet.   Continue Levemir 12 units HS. Similar to tolerated home Regimen.  Increase Novolog 7 units TIDWM with Low Dose SQ Insulin Correction Scale. (Tolerated home dosing).  BG monitoring AC/HS.     ** Please notify Endocrine for any change and/or advance in diet**  ** Please call Endocrine for any BG related issues **    Discharge Recommendations:   1) Patient has follow-up/intitial appointment with [facility] End[facility]ology clinic on 04/24/2019. He will need to be rescheduled.   2) Patient is to continue current home MDI regimen, keep BG logs. Logs given to patient (04/24/2019).       - Tresiba 13 units HS      - Novolog 7 units TIDWM with following correction scale:           150 - 200 + 1 unit          201 - 250 + 2 units          251 - 300 + 3 units          301 - 350 + 4 units           > 350   + 5 units

## 2019-04-24 NOTE — SUBJECTIVE & OBJECTIVE
"Interval HPI:   Overnight events:  BG is within or slightly below goal on current reduced home SQ insulin regimen. Possible discharge today. NAEON.    Eatin%  Nausea: No  Hypoglycemia and intervention: No  Fever: No  TPN and/or TF: No  If yes, type of TF/TPN and rate: None    BP (!) 156/88   Pulse 84   Temp 98.1 °F (36.7 °C)   Resp 17   Ht 5' 11" (1.803 m)   Wt 103.7 kg (228 lb 8.1 oz)   SpO2 99%   BMI 31.87 kg/m²     Labs Reviewed and Include    Recent Labs   Lab 19  0500   *   CALCIUM 8.7   ALBUMIN 3.0*   PROT 6.8      K 3.5   CO2 27      BUN 16   CREATININE 1.4   ALKPHOS 78   ALT 18   AST 20   BILITOT 0.3     Lab Results   Component Value Date    WBC 4.30 2019    HGB 9.4 (L) 2019    HCT 29.0 (L) 2019    MCV 89 2019     2019     No results for input(s): TSH, FREET4 in the last 168 hours.  Lab Results   Component Value Date    HGBA1C 10.9 (H) 2019       Nutritional status:   Body mass index is 31.87 kg/m².  Lab Results   Component Value Date    ALBUMIN 3.0 (L) 2019    ALBUMIN 2.9 (L) 2019    ALBUMIN 3.3 (L) 2019     No results found for: PREALBUMIN    Estimated Creatinine Clearance: 72.3 mL/min (based on SCr of 1.4 mg/dL).    Accu-Checks  Recent Labs     19  1701 19  2231 19  0749 19  1148 19  1653 19  2035   POCTGLUCOSE 348* 247* 187* 193* 211* 280*       Current Medications and/or Treatments Impacting Glycemic Control  Immunotherapy:    Immunosuppressants         Stop Route Frequency     tacrolimus capsule 1 mg      -- Oral 2 times daily        Steroids:   Hormones (From admission, onward)    Start     Stop Route Frequency Ordered    19 1415  predniSONE tablet 5 mg      -- Oral Daily 19 1308        Pressors:    Autonomic Drugs (From admission, onward)    None        Hyperglycemia/Diabetes Medications:   Antihyperglycemics (From admission, onward)    Start     Stop " Route Frequency Ordered    04/23/19 2100  insulin detemir U-100 pen 12 Units      -- SubQ Nightly 04/23/19 1232    04/22/19 1830  insulin aspart U-100 pen 5 Units      -- SubQ 3 times daily with meals 04/22/19 1829    04/22/19 1500  insulin aspart U-100 pen 0-5 Units      -- SubQ Before meals & nightly PRN 04/22/19 1400

## 2019-04-24 NOTE — ASSESSMENT & PLAN NOTE
Last CMV PCR 3870. Will administer Foscarnet while inpatient. Monitor electrolytes. ID following, appreciate recs. Likely discharge home tomorrow.

## 2019-04-25 VITALS
DIASTOLIC BLOOD PRESSURE: 85 MMHG | HEIGHT: 71 IN | SYSTOLIC BLOOD PRESSURE: 154 MMHG | TEMPERATURE: 98 F | OXYGEN SATURATION: 100 % | RESPIRATION RATE: 16 BRPM | BODY MASS INDEX: 31.99 KG/M2 | HEART RATE: 81 BPM | WEIGHT: 228.5 LBS

## 2019-04-25 LAB
ALBUMIN SERPL BCP-MCNC: 2.9 G/DL (ref 3.5–5.2)
ALP SERPL-CCNC: 72 U/L (ref 55–135)
ALT SERPL W/O P-5'-P-CCNC: 18 U/L (ref 10–44)
ANION GAP SERPL CALC-SCNC: 7 MMOL/L (ref 8–16)
AST SERPL-CCNC: 20 U/L (ref 10–40)
BASOPHILS # BLD AUTO: 0.05 K/UL (ref 0–0.2)
BASOPHILS NFR BLD: 0.9 % (ref 0–1.9)
BILIRUB SERPL-MCNC: 0.3 MG/DL (ref 0.1–1)
BUN SERPL-MCNC: 15 MG/DL (ref 6–20)
CALCIUM SERPL-MCNC: 8.8 MG/DL (ref 8.7–10.5)
CHLORIDE SERPL-SCNC: 105 MMOL/L (ref 95–110)
CO2 SERPL-SCNC: 26 MMOL/L (ref 23–29)
CREAT SERPL-MCNC: 1.5 MG/DL (ref 0.5–1.4)
DIFFERENTIAL METHOD: ABNORMAL
EOSINOPHIL # BLD AUTO: 0.3 K/UL (ref 0–0.5)
EOSINOPHIL NFR BLD: 5.1 % (ref 0–8)
ERYTHROCYTE [DISTWIDTH] IN BLOOD BY AUTOMATED COUNT: 13.1 % (ref 11.5–14.5)
EST. GFR  (AFRICAN AMERICAN): 59.3 ML/MIN/1.73 M^2
EST. GFR  (NON AFRICAN AMERICAN): 51.3 ML/MIN/1.73 M^2
GLUCOSE SERPL-MCNC: 138 MG/DL (ref 70–110)
HCT VFR BLD AUTO: 28.9 % (ref 40–54)
HGB BLD-MCNC: 9.4 G/DL (ref 14–18)
IMM GRANULOCYTES # BLD AUTO: 0.03 K/UL (ref 0–0.04)
IMM GRANULOCYTES NFR BLD AUTO: 0.6 % (ref 0–0.5)
LYMPHOCYTES # BLD AUTO: 1.2 K/UL (ref 1–4.8)
LYMPHOCYTES NFR BLD: 22.4 % (ref 18–48)
MAGNESIUM SERPL-MCNC: 1.2 MG/DL (ref 1.6–2.6)
MCH RBC QN AUTO: 29.1 PG (ref 27–31)
MCHC RBC AUTO-ENTMCNC: 32.5 G/DL (ref 32–36)
MCV RBC AUTO: 90 FL (ref 82–98)
MONOCYTES # BLD AUTO: 0.9 K/UL (ref 0.3–1)
MONOCYTES NFR BLD: 16.4 % (ref 4–15)
NEUTROPHILS # BLD AUTO: 2.9 K/UL (ref 1.8–7.7)
NEUTROPHILS NFR BLD: 54.6 % (ref 38–73)
NRBC BLD-RTO: 0 /100 WBC
PLATELET # BLD AUTO: 228 K/UL (ref 150–350)
PMV BLD AUTO: 8.5 FL (ref 9.2–12.9)
POCT GLUCOSE: 147 MG/DL (ref 70–110)
POCT GLUCOSE: 162 MG/DL (ref 70–110)
POCT GLUCOSE: 195 MG/DL (ref 70–110)
POTASSIUM SERPL-SCNC: 3.5 MMOL/L (ref 3.5–5.1)
PROT SERPL-MCNC: 6.7 G/DL (ref 6–8.4)
RBC # BLD AUTO: 3.23 M/UL (ref 4.6–6.2)
SODIUM SERPL-SCNC: 138 MMOL/L (ref 136–145)
TACROLIMUS BLD-MCNC: 4 NG/ML (ref 5–15)
WBC # BLD AUTO: 5.31 K/UL (ref 3.9–12.7)

## 2019-04-25 PROCEDURE — 99233 PR SUBSEQUENT HOSPITAL CARE,LEVL III: ICD-10-PCS | Mod: ,,, | Performed by: INTERNAL MEDICINE

## 2019-04-25 PROCEDURE — 80053 COMPREHEN METABOLIC PANEL: CPT

## 2019-04-25 PROCEDURE — 85025 COMPLETE CBC W/AUTO DIFF WBC: CPT

## 2019-04-25 PROCEDURE — 25000003 PHARM REV CODE 250: Performed by: INTERNAL MEDICINE

## 2019-04-25 PROCEDURE — 99238 PR HOSPITAL DISCHARGE DAY,<30 MIN: ICD-10-PCS | Mod: ,,, | Performed by: PHYSICIAN ASSISTANT

## 2019-04-25 PROCEDURE — 99238 HOSP IP/OBS DSCHRG MGMT 30/<: CPT | Mod: ,,, | Performed by: PHYSICIAN ASSISTANT

## 2019-04-25 PROCEDURE — 99233 SBSQ HOSP IP/OBS HIGH 50: CPT | Mod: ,,, | Performed by: INTERNAL MEDICINE

## 2019-04-25 PROCEDURE — 80197 ASSAY OF TACROLIMUS: CPT

## 2019-04-25 PROCEDURE — 63600175 PHARM REV CODE 636 W HCPCS: Performed by: PHYSICIAN ASSISTANT

## 2019-04-25 PROCEDURE — 63600175 PHARM REV CODE 636 W HCPCS: Mod: JG | Performed by: INTERNAL MEDICINE

## 2019-04-25 PROCEDURE — 25000003 PHARM REV CODE 250: Performed by: PHYSICIAN ASSISTANT

## 2019-04-25 PROCEDURE — 63600175 PHARM REV CODE 636 W HCPCS: Performed by: NURSE PRACTITIONER

## 2019-04-25 PROCEDURE — 83735 ASSAY OF MAGNESIUM: CPT

## 2019-04-25 RX ORDER — LANOLIN ALCOHOL/MO/W.PET/CERES
400 CREAM (GRAM) TOPICAL NIGHTLY
Qty: 30 TABLET | Refills: 11 | Status: SHIPPED | OUTPATIENT
Start: 2019-04-25 | End: 2019-06-10 | Stop reason: ALTCHOICE

## 2019-04-25 RX ORDER — POTASSIUM CHLORIDE 750 MG/1
20 CAPSULE, EXTENDED RELEASE ORAL DAILY
Qty: 60 CAPSULE | Refills: 1 | Status: SHIPPED | OUTPATIENT
Start: 2019-04-25 | End: 2019-06-20 | Stop reason: SDUPTHER

## 2019-04-25 RX ADMIN — FOSCARNET SODIUM 5004 MG: 24 INJECTION, SOLUTION INTRAVENOUS at 04:04

## 2019-04-25 RX ADMIN — PRAVASTATIN SODIUM 20 MG: 20 TABLET ORAL at 08:04

## 2019-04-25 RX ADMIN — FAMOTIDINE 20 MG: 20 TABLET, FILM COATED ORAL at 08:04

## 2019-04-25 RX ADMIN — MAGNESIUM SULFATE IN WATER 2 G: 40 INJECTION, SOLUTION INTRAVENOUS at 01:04

## 2019-04-25 RX ADMIN — TACROLIMUS 1 MG: 1 CAPSULE ORAL at 08:04

## 2019-04-25 RX ADMIN — SODIUM CHLORIDE 500 ML: 0.9 INJECTION, SOLUTION INTRAVENOUS at 04:04

## 2019-04-25 RX ADMIN — INSULIN ASPART 0 UNITS: 100 INJECTION, SOLUTION INTRAVENOUS; SUBCUTANEOUS at 07:04

## 2019-04-25 RX ADMIN — INSULIN ASPART 5 UNITS: 100 INJECTION, SOLUTION INTRAVENOUS; SUBCUTANEOUS at 11:04

## 2019-04-25 RX ADMIN — APIXABAN 2.5 MG: 2.5 TABLET, FILM COATED ORAL at 08:04

## 2019-04-25 RX ADMIN — FOLIC ACID 1000 MCG: 1 TABLET ORAL at 08:04

## 2019-04-25 RX ADMIN — INSULIN ASPART 5 UNITS: 100 INJECTION, SOLUTION INTRAVENOUS; SUBCUTANEOUS at 07:04

## 2019-04-25 RX ADMIN — MAGNESIUM SULFATE IN WATER 2 G: 40 INJECTION, SOLUTION INTRAVENOUS at 11:04

## 2019-04-25 RX ADMIN — INSULIN ASPART 0 UNITS: 100 INJECTION, SOLUTION INTRAVENOUS; SUBCUTANEOUS at 05:04

## 2019-04-25 RX ADMIN — INSULIN ASPART 5 UNITS: 100 INJECTION, SOLUTION INTRAVENOUS; SUBCUTANEOUS at 05:04

## 2019-04-25 RX ADMIN — INSULIN ASPART 0 UNITS: 100 INJECTION, SOLUTION INTRAVENOUS; SUBCUTANEOUS at 11:04

## 2019-04-25 RX ADMIN — SODIUM CHLORIDE 500 ML: 0.9 INJECTION, SOLUTION INTRAVENOUS at 03:04

## 2019-04-25 RX ADMIN — MAGNESIUM 64 MG (MAGNESIUM CHLORIDE) TABLET,DELAYED RELEASE 128 MG: at 08:04

## 2019-04-25 RX ADMIN — PREDNISONE 5 MG: 5 TABLET ORAL at 08:04

## 2019-04-25 RX ADMIN — AMLODIPINE BESYLATE 5 MG: 5 TABLET ORAL at 08:04

## 2019-04-25 RX ADMIN — FLUTICASONE PROPIONATE 50 MCG: 50 SPRAY, METERED NASAL at 08:04

## 2019-04-25 RX ADMIN — ASPIRIN 81 MG: 81 TABLET, COATED ORAL at 08:04

## 2019-04-25 RX ADMIN — FOSCARNET SODIUM 5004 MG: 24 INJECTION, SOLUTION INTRAVENOUS at 05:04

## 2019-04-25 NOTE — PLAN OF CARE
Problem: Adult Inpatient Plan of Care  Goal: Plan of Care Review  Outcome: Ongoing (interventions implemented as appropriate)  - Plan to discharge home after Foscarnet infusion  - Currently  mL bolus infusing  - Plan for self administration of Foscarnet at home  - Central line dressing changed  - Mag Sulfate 4 g administered per PRN orders  - Spoke to CLARE Fraser re: patient concerned about mild BLE edema.  Per PA, will call new prescription to his home pharmacy.  Take Lasix daily PRN swelling, HOWEVER: if lasix is needed more than 2 days in a row, call Lung Transplant clinic for further directions.

## 2019-04-25 NOTE — PROGRESS NOTES
"[facility]  Endocrinology  Progress Note    Admit Date: 2019       Reason for Consult: Management of T2DM, Hyperglycemia     Reason for Consult: Management of type 2 DM, Hyperglycemia      Surgical Procedure and Date: 2017 - Bilateral Lung Transplant     Diabetes diagnosis year:      Home Diabetes Medications:     Tresiba 13 units HS and humalog 7 units with meals and correctionscale.    How often checking glucose at home? BID   BG readings on regimen: <170  Hypoglycemia on the regimen?  no  Missed doses on regimen?  None     Diabetes Complications include:     Hyperglycemia     Complicating diabetes co morbidities:   KRISTYN and Glucocorticoid use , A-fib, AVILA, Pancreatitis, Osteopenia     HPI:   Patient is a 58 y.o. male with a diagnosis of type 2 DM on MDI. Also with sarcoidosis (s/p BLT), KRISTYN, osteopenia, and Afib. Patient presents today as a direct admission for administration of foscarnet on 2019. Endocrinology consulted to manage DM2/Hyperglycemia.    Lab Results   Component Value Date    HGBA1C 10.9 (H) 2019                   Interval HPI:   Overnight events:  BG is within or slightly below goal on current reduced home SQ insulin regimen. Possible discharge today. NAEON.    Eatin%  Nausea: No  Hypoglycemia and intervention: No  Fever: No  TPN and/or TF: No  If yes, type of TF/TPN and rate: None    BP (!) 156/88   Pulse 84   Temp 98.1 °F (36.7 °C)   Resp 17   Ht 5' 11" (1.803 m)   Wt 103.7 kg (228 lb 8.1 oz)   SpO2 99%   BMI 31.87 kg/m²      Labs Reviewed and Include    Recent Labs   Lab 19  0500   *   CALCIUM 8.7   ALBUMIN 3.0*   PROT 6.8      K 3.5   CO2 27      BUN 16   CREATININE 1.4   ALKPHOS 78   ALT 18   AST 20   BILITOT 0.3     Lab Results   Component Value Date    WBC 4.30 2019    HGB 9.4 (L) 2019    HCT 29.0 (L) 2019    MCV 89 2019     2019     No results for input(s): TSH, FREET4 " in the last 168 hours.  Lab Results   Component Value Date    HGBA1C 10.9 (H) 02/12/2019       Nutritional status:   Body mass index is 31.87 kg/m².  Lab Results   Component Value Date    ALBUMIN 3.0 (L) 04/24/2019    ALBUMIN 2.9 (L) 04/23/2019    ALBUMIN 3.3 (L) 04/22/2019     No results found for: PREALBUMIN    Estimated Creatinine Clearance: 72.3 mL/min (based on SCr of 1.4 mg/dL).    Accu-Checks  Recent Labs     04/22/19  1701 04/22/19  2231 04/23/19  0749 04/23/19  1148 04/23/19  1653 04/23/19  2035   POCTGLUCOSE 348* 247* 187* 193* 211* 280*       Current Medications and/or Treatments Impacting Glycemic Control  Immunotherapy:    Immunosuppressants         Stop Route Frequency     tacrolimus capsule 1 mg      -- Oral 2 times daily        Steroids:   Hormones (From admission, onward)    Start     Stop Route Frequency Ordered    04/22/19 1415  predniSONE tablet 5 mg      -- Oral Daily 04/22/19 1308        Pressors:    Autonomic Drugs (From admission, onward)    None        Hyperglycemia/Diabetes Medications:   Antihyperglycemics (From admission, onward)    Start     Stop Route Frequency Ordered    04/23/19 2100  insulin detemir U-100 pen 12 Units      -- SubQ Nightly 04/23/19 1232    04/22/19 1830  insulin aspart U-100 pen 5 Units      -- SubQ 3 times daily with meals 04/22/19 1829    04/22/19 1500  insulin aspart U-100 pen 0-5 Units      -- SubQ Before meals & nightly PRN 04/22/19 1400          ASSESSMENT and PLAN    * Cytomegalovirus (CMV) viremia  Managed per primary team  Avoid hypoglycemia        Type 2 diabetes mellitus with hyperglycemia, with long-term current use of insulin  BG goal 140 - 180    BG is significantly above goal at time of consult. Patient endorses noncompliance with ADA diet.   Continue Levemir 12 units HS. Similar to tolerated home Regimen.  Increase Novolog 7 units TIDWM with Low Dose SQ Insulin Correction Scale. (Tolerated home dosing).  BG monitoring AC/HS.     ** Please notify  Endocrine for any change and/or advance in diet**  ** Please call Endocrine for any BG related issues **    Discharge Recommendations:   1) Patient has follow-up/intitial appointment with [facility] on 04/24/2019. He will need to be rescheduled.   2) Patient is to continue current home MDI regimen, keep BG logs. Logs given to patient (04/24/2019).       - Tresiba 13 units HS      - Novolog 7 units TIDWM with following correction scale:           150 - 200 + 1 unit          201 - 250 + 2 units          251 - 300 + 3 units          301 - 350 + 4 units           > 350   + 5 units          Adrenal cortical steroids causing adverse effect in therapeutic use  On steroid therapy per transplant team; may elevate BG readings        Immunosuppression  On immunosepresive therapy per transplant team; may elevate BG readings        Class 1 obesity due to excess calories with serious comorbidity and body mass index (BMI) of 30.0 to 30.9 in adult  may contribute to insulin resistance  Body mass index is 31.87 kg/m².            Delroy Vergaar, NP  Endocrinology  [facility]

## 2019-04-25 NOTE — SUBJECTIVE & OBJECTIVE
Interval History:  Foscarnet dose adjusted  Patient reports having nausea and headache for about an hour after foscarnet infusion this AM    Review of Systems   Constitutional: Negative for chills, diaphoresis and fever.   HENT: Negative for rhinorrhea and sore throat.    Respiratory: Negative for cough and shortness of breath.    Cardiovascular: Negative for chest pain and leg swelling.   Gastrointestinal: Negative for abdominal pain, diarrhea, nausea and vomiting.   Genitourinary: Negative for dysuria and hematuria.   Musculoskeletal: Negative for arthralgias and myalgias.   Skin: Negative for rash.   Neurological: Negative for headaches.     Objective:     Vital Signs (Most Recent):  Temp: 98.6 °F (37 °C) (04/24/19 1914)  Pulse: 85 (04/24/19 1914)  Resp: 20 (04/24/19 1914)  BP: (!) 153/94 (04/24/19 1914)  SpO2: 97 % (04/24/19 1914) Vital Signs (24h Range):  Temp:  [97.5 °F (36.4 °C)-98.6 °F (37 °C)] 98.6 °F (37 °C)  Pulse:  [] 85  Resp:  [14-20] 20  SpO2:  [96 %-99 %] 97 %  BP: (118-156)/(74-94) 153/94     Weight: 103.7 kg (228 lb 8.1 oz)  Body mass index is 31.87 kg/m².    Estimated Creatinine Clearance: 72.3 mL/min (based on SCr of 1.4 mg/dL).    Physical Exam   Constitutional: He is oriented to person, place, and time. He appears well-developed and well-nourished. No distress.   HENT:   Head: Normocephalic and atraumatic.   Eyes: Conjunctivae and EOM are normal.   Neck: Normal range of motion. Neck supple.   Pulmonary/Chest: Effort normal. No respiratory distress.   Abdominal: Soft. He exhibits no distension.   Musculoskeletal: Normal range of motion. He exhibits no edema.   Neurological: He is alert and oriented to person, place, and time.   Skin: Skin is warm and dry. No rash noted. He is not diaphoretic. No erythema.   Psychiatric: He has a normal mood and affect. His behavior is normal.   Vitals reviewed.      Significant Labs: All pertinent labs within the past 24 hours have been  reviewed.    Significant Imaging: I have reviewed all pertinent imaging results/findings within the past 24 hours.

## 2019-04-25 NOTE — DISCHARGE SUMMARY
[facility]  Lung Transplant  Discharge Summary      Patient Name: Martin Hendrix Jr.  MRN: [id number]  Admission Date: 4/22/2019  Hospital Length of Stay: 3 days  Discharge Date and Time: 04/25/2019 3:05 PM  Attending Physician: Samantha Bill DO   Discharging Provider: Patricia Fowler PA-C  Primary Care Provider: Sukhi Dunham Jr, MD     HPI: Mr. Hendrix is a 58 year old male s/p BLT in 8/22/17 with history of recurrent CMV viremia who presents today as a direct admission for administration of foscarnet. Patient states he is doing well from a respiratory standpoint. He reports occasional dry cough and congestion which is unchanged from his baseline. He otherwise denies fevers, chills, night sweats, abdominal pain, sputum production, hemoptysis, chest pain, dyspnea, recent sick contacts. He is followed closely by Dr. Lowry for his CMV viremia. He is compliant with all of his medications.      * No surgery found *     Hospital Course: Patient with uneventful hospital course. Patient tolerated Foscarnet dose increase while inpatient well. Cr stable at 1.5, K 3.5, and Mg 1.2 upon discharge. Will plan for patient to continue outpatient Foscarnet infusions through 5/6/2019. Patient to follow up with Infectious Disease, Dr. Lowry and to follow up in lung transplant clinic.     * Cytomegalovirus (CMV) viremia  Last CMV PCR 3870. Will plan to continue Foscarnet 5000 mg IV via right chest wall tunneled catheter every 12 hours (0500, 1700) through May 6, 2019.  Normal saline 0.9% 500 cc IV bolus to be administered prior to each dose of Foscarnet. Mag oxide supplementation at 400 mg QHS added. Continue microK 20 mEq daily.        Lung replaced by transplant  Underwent BLT 8/22/2017 for sarcoidosis. Continue immunosuppression with tacrolimus 1 mg BID, prednisone 5 mg daily,      Immunosuppression  Continue tacrolimus 1 mg BID and prednisone 5 mg daily. Will monitor tacrolimus levels at outpatient  clinic visit and dose adjust if necessary.      Prophylactic antibiotic  Continue Bactrim SS every MWF.      Type 2 diabetes mellitus with hyperglycemia, with long-term current use of insulin  Discharge meds per endocrine. Patient to follow up with endocrinology as outpatient.         Consults (From admission, onward)        Status Ordering Provider     Inpatient consult to Endocrinology  Once     Provider:  (Not yet assigned)    Completed NADINE MENDEZ     Inpatient consult to Infectious Diseases  Once     Provider:  (Not yet assigned)    Completed NADINE MENDEZ          Significant Diagnostic Studies: Labs: All labs within the past 24 hours have been reviewed    Pending Diagnostic Studies:     None        Final Active Diagnoses:    Diagnosis Date Noted POA    PRINCIPAL PROBLEM:  Cytomegalovirus (CMV) viremia [B25.9] 02/12/2019 Yes    Lung replaced by transplant [Z94.2] 08/24/2017 Not Applicable    Immunosuppression [D89.9] 08/22/2017 Yes    Prophylactic antibiotic [Z79.2] 08/22/2017 Not Applicable    Type 2 diabetes mellitus with hyperglycemia, with long-term current use of insulin [E11.65, Z79.4] 11/06/2017 Not Applicable     Chronic    Adrenal cortical steroids causing adverse effect in therapeutic use [T38.0X5A] 08/22/2017 Yes    Class 1 obesity due to excess calories with serious comorbidity and body mass index (BMI) of 30.0 to 30.9 in adult [E66.09, Z68.30] 08/22/2017 Not Applicable      Problems Resolved During this Admission:      Discharged Condition: stable    Disposition: Home or Self Care    Medications:  Reconciled Home Medications:      Medication List      START taking these medications    potassium chloride 10 MEQ Cpsr  Commonly known as:  MICRO-K  Take 2 capsules (20 mEq total) by mouth once daily.        CHANGE how you take these medications    magnesium oxide 400 mg (241.3 mg magnesium) tablet  Commonly known as:  MAG-OX  Take 1 tablet (400 mg total) by mouth every  "evening.  What changed:    · when to take this  · reasons to take this        CONTINUE taking these medications    amLODIPine 5 MG tablet  Commonly known as:  NORVASC  Take 1 tablet (5 mg total) by mouth once daily.     calcium-vitamin D3 600 mg(1,500mg) -200 unit Tab  Commonly known as:  CALCIUM 600 + D(3)  Take 1 tablet by mouth 2 (two) times daily.     ECOTRIN LOW STRENGTH 81 MG EC tablet  Generic drug:  aspirin  TAKE 1 TABLET DAILY     ELIQUIS 2.5 mg Tab  Generic drug:  apixaban  Take 1 tablet (2.5 mg total) by mouth 2 (two) times daily.     famotidine 20 MG tablet  Commonly known as:  PEPCID  TAKE 1 TABLET TWICE A DAY     fluticasone 50 mcg/actuation nasal spray  Commonly known as:  FLONASE  1 spray by Each Nare route once daily.     folic acid 1 MG tablet  Commonly known as:  FOLVITE  TAKE 1 TABLET DAILY     insulin degludec 200 unit/mL (3 mL) Inpn  Commonly known as:  TRESIBA FLEXTOUCH U-200  Inject 13 Units into the skin every evening.     insulin lispro 100 unit/mL pen  Commonly known as:  HumaLOG KwikPen Insulin  Inject 7 Units into the skin 3 (three) times daily before meals.     lancets Misc  To check BG 4 times daily, to use with insurance preferred meter     MAG 64 64 mg Tbec  Generic drug:  magnesium chloride  Take 2 tablets (128 mg total) by mouth once daily.     ONETOUCH VERIO Strp  Generic drug:  blood sugar diagnostic  USE TO CHECK BLOOD GLUCOSE FOUR TIMES A DAY, TO USE WITH INSURANCE PREFERRED METER     pen needle, diabetic 32 gauge x 5/32" Ndle  Commonly known as:  BD ULTRA-FINE JIM PEN NEEDLE  Uses 4 times daily, on multiple daily insulin injections     pravastatin 20 MG tablet  Commonly known as:  PRAVACHOL  TAKE 1 TABLET BY MOUTH ONCE DAILY     predniSONE 5 MG tablet  Commonly known as:  DELTASONE  Take 1 tablet (5 mg total) by mouth once daily.     sulfamethoxazole-trimethoprim 400-80mg 400-80 mg per tablet  Commonly known as:  BACTRIM,SEPTRA  Take 1 tablet by mouth every Mon, Wed, Fri.   "   tacrolimus 0.5 MG Cap  Commonly known as:  PROGRAF  Take 2 capsules (1 mg total) by mouth every 12 (twelve) hours.        STOP taking these medications    ACCU-CHEK DEANNE PLUS METER Misc  Generic drug:  blood-glucose meter        \  Patient Instructions:      Ambulatory Referral to Infusion Dept   Referral Priority: Routine Referral Type: Consultation   Referral Reason: Specialty Services Required   Requested Specialty: IV Infusion   Number of Visits Requested: 1     Diet diabetic     Notify your health care provider if you experience any of the following:     Notify your health care provider if you experience any of the following:  increased confusion or weakness     Notify your health care provider if you experience any of the following:  persistent dizziness, light-headedness, or visual disturbances     Notify your health care provider if you experience any of the following:  worsening rash     Notify your health care provider if you experience any of the following:  severe persistent headache     Notify your health care provider if you experience any of the following:  difficulty breathing or increased cough     Notify your health care provider if you experience any of the following:  redness, tenderness, or signs of infection (pain, swelling, redness, odor or green/yellow discharge around incision site)     Notify your health care provider if you experience any of the following:  severe uncontrolled pain     Notify your health care provider if you experience any of the following:  persistent nausea and vomiting or diarrhea     Notify your health care provider if you experience any of the following:  temperature >100.4     Activity as tolerated     Follow Up:  Follow-up Information     Darrick Wolfe MD In 2 weeks.    Specialty:  Transplant  Contact information:  [address] [place] [id number]  [phone]                   Patricia Fowler PA-C  Lung Transplant  [facility]

## 2019-04-25 NOTE — DISCHARGE INSTRUCTIONS
If Lasix (furosemide) is needed more than 2 days in a row, call Lung Transplant clinic for further directions.

## 2019-04-25 NOTE — PLAN OF CARE
Problem: Adult Inpatient Plan of Care  Goal: Plan of Care Review  Pt AAOx4, bed low locked, call light within reach, wearing nonskid socks. Pt instructed to use call light for assistance, pt verbalizes understanding.   Pt denies any pain or discomfort at this time. AC/HS, 145, covered with ss insulin. Pt remains free from injury/harm at this time. Afebrile. See flowsheet for full assessment/VS.

## 2019-04-25 NOTE — TELEPHONE ENCOUNTER
Patient was admitted to hospital for steroid therapy at time of appointment. Patient need to be re-scheduled with DIEGO Prieto NP or next available outpatient provider.     Patient will also need DM education.     Thank you,     Delroy Vergara NP

## 2019-04-25 NOTE — PROGRESS NOTES
[facility]  Infectious Disease  Progress Note    Patient Name: Martin Hendrix Jr.  MRN: [id number]  Admission Date: 4/22/2019  Length of Stay: 2 days  Attending Physician: Samantha Bill DO  Primary Care Provider: Sukhi Dunham Jr, MD    Isolation Status: No active isolations  Assessment/Plan:      * Cytomegalovirus (CMV) viremia  58-year-old male with history of sarcoidosis (c/b pulmonary HTN and ESLD; s/p BOLT 8/22/2017, CMV D+/R-, basiliximab induction, on maintenance tacro/MMF/pred; c/b left vocal cord dysfunction, prolonged ACR-2 s/p pulse SM in 10/2017 and thymo x3 in 3/2018), steroid-induced DM2, CKD stage 3, CMV infection 8/2018 c/b UL-97 resistance enrolled in maribavir treatment study with subsequent marabivir failure who presents for recurrent CMV infection.     Recommendations:  - Continue induction foscarnet IV dosed per CrCl/kg - at least 14 day cousre  - Hydration with normal saline 500 mL IV prior to foscarnet dosing  - Okay to discharge once a stable foscarnet dosing is established  - qweekly CMV VL, next level due 4/29/2019               Thank you for your consult. I will follow-up with patient. Please contact us if you have any additional questions.    Yue Lowry MD  Infectious Disease  [facility]    Subjective:     Principal Problem:Cytomegalovirus (CMV) viremia    HPI: 59yo man w/a history of sarcoidosis (c/b pulmonary HTN and ESLD; s/p BOLT 8/22/2017, CMV D+/R-, basiliximab induction, on maintenance tacro/MMF/pred; c/b left vocal cord dysfunction, prolonged ACR-2 s/p pulse SM in 10/2017 and thymo x3 in 3/2018, CMV infection 8/2018 c/b UL-97 resistance enrolled in maribavir treatment study with subsequent marabivir failure who presents for recurrent ganciclovir-resistant CMV infection.  Patient was admitted 2/12/2019-2/15/2019 for initiation of foscarnet therapy. Patient subsequently developed AVILA on foscarnet, therapy held, last dose on  3/2/2019.  CMV viral load was being monitored qweekly - most recently VL in 3000-4000s.  Patient is otherwise asymptomatic - no fevers, chills, night sweats, SOB, cough, CP, n/v/d, abdominal pain, hematuria, dysuria.    Interval History:  Foscarnet dose adjusted  Patient reports having nausea and headache for about an hour after foscarnet infusion this AM    Review of Systems   Constitutional: Negative for chills, diaphoresis and fever.   HENT: Negative for rhinorrhea and sore throat.    Respiratory: Negative for cough and shortness of breath.    Cardiovascular: Negative for chest pain and leg swelling.   Gastrointestinal: Negative for abdominal pain, diarrhea, nausea and vomiting.   Genitourinary: Negative for dysuria and hematuria.   Musculoskeletal: Negative for arthralgias and myalgias.   Skin: Negative for rash.   Neurological: Negative for headaches.     Objective:     Vital Signs (Most Recent):  Temp: 98.6 °F (37 °C) (04/24/19 1914)  Pulse: 85 (04/24/19 1914)  Resp: 20 (04/24/19 1914)  BP: (!) 153/94 (04/24/19 1914)  SpO2: 97 % (04/24/19 1914) Vital Signs (24h Range):  Temp:  [97.5 °F (36.4 °C)-98.6 °F (37 °C)] 98.6 °F (37 °C)  Pulse:  [] 85  Resp:  [14-20] 20  SpO2:  [96 %-99 %] 97 %  BP: (118-156)/(74-94) 153/94     Weight: 103.7 kg (228 lb 8.1 oz)  Body mass index is 31.87 kg/m².    Estimated Creatinine Clearance: 72.3 mL/min (based on SCr of 1.4 mg/dL).    Physical Exam   Constitutional: He is oriented to person, place, and time. He appears well-developed and well-nourished. No distress.   HENT:   Head: Normocephalic and atraumatic.   Eyes: Conjunctivae and EOM are normal.   Neck: Normal range of motion. Neck supple.   Pulmonary/Chest: Effort normal. No respiratory distress.   Abdominal: Soft. He exhibits no distension.   Musculoskeletal: Normal range of motion. He exhibits no edema.   Neurological: He is alert and oriented to person, place, and time.   Skin: Skin is warm and dry. No rash noted. He  is not diaphoretic. No erythema.   Psychiatric: He has a normal mood and affect. His behavior is normal.   Vitals reviewed.      Significant Labs: All pertinent labs within the past 24 hours have been reviewed.    Significant Imaging: I have reviewed all pertinent imaging results/findings within the past 24 hours.

## 2019-04-25 NOTE — PROGRESS NOTES
Patient will be discharged home today pending insurance approval of home Foscarnet therapy.  Discharge instructions and outpatient plan of care as determined by Dr. Bill and the ID team were reviewed with the patient as follows:  1.  Treatment for CMV viremia:  Foscarnet 5000 mg IV via right chest wall tunneled catheter every 12 hours (0500, 1700) through May 6, 2019.  Normal saline 0.9% 500 cc IV bolus to be administered prior to each dose of Foscarnet.  Patient is already established with [facility] and he opted to continue using this provider for infusion services.  Patient will self-infuse IV medications.  Explained that a nurse liaison from [facility] will meet with him to review the home infusion procedure prior to discharge. He will report to the [facility] infusion center in [place] on Friday, May 3 to have the dressing to his tunneled catheter changed.    2.  Non-fasting labs on April 29 (CMV PCR, CBC, CMP, Mg, PO4) and May 2 (CMP, Mg, PO4).  Patient asked for labs to be scheduled at an [facility] location close to his place of employment in [place], [place].  After reviewing options with him, he agreed with having labs drawn at the [facility] location.  Patient requested for these lab appointments to be scheduled around his lunchtime.  3.  Medications:  - Continue magnesium chloride 184 mg by mouth every morning.  Add magnesium oxide 400 mg by mouth every evening.  - Micro K 20 mEq by mouth once daily.  Patient asked for prescriptions to be sent to the [facility] outpatient pharmacy so he can pick them up before leaving the hospital today.   4.  Return for a chest xray, labs, spirometry, and lung transplant clinic visit on Monday, May 6 instead of Wednesday, May 15.  Reviewed times, locations and special instructions (e.g., take morning Prograf dose after blood is drawn) for these appointments.  Provided verbal and written instructions re: this discharge plan.  The patient asked  questions, which were answered to his satisfaction.  He repeated all information back to me correctly and demonstrated his readiness for discharge.

## 2019-04-25 NOTE — CONSULTS
Food & Nutrition  Education    Diet Education: Diabetic diet  Time Spent: 15 minutes  Learners: Pt    Nutrition Education provided with handouts: Consistent CHO Nutrition Therapy    Comments: No new current HbA1C. Previous labs 2/2019 reveal A1C 10.9%. Noted pt receiving steroids to treat CMV contributing to elevated lab. Pt reports following a DM diet at home and able to verbalize foods containing CHO, how to read a nutrition facts label, and importance of consuming 3 meals/day. Pt states BG levels range between 150-170. Educated on CHO counting and choosing CHO between 4-6 choices or 45-60gm CHO per meal. Pt verbalized understanding.     All questions and concerns answered. Dietitian's contact information provided.     Follow-Up: May 2, 2019    Please Re-consult as needed    Recommendations: Suggest updated HbA1C lab.    Thanks!  Verónica Cruz RD, LDN

## 2019-04-26 ENCOUNTER — TELEPHONE (OUTPATIENT)
Dept: TRANSPLANT | Facility: CLINIC | Age: 59
End: 2019-04-26

## 2019-04-26 NOTE — SUBJECTIVE & OBJECTIVE
Interval History:  Patient feels well, no complaints    Review of Systems   Constitutional: Negative for chills, diaphoresis and fever.   HENT: Negative for rhinorrhea and sore throat.    Respiratory: Negative for cough and shortness of breath.    Cardiovascular: Negative for chest pain and leg swelling.   Gastrointestinal: Negative for abdominal pain, diarrhea, nausea and vomiting.   Genitourinary: Negative for dysuria and hematuria.   Musculoskeletal: Negative for arthralgias and myalgias.   Skin: Negative for rash.   Neurological: Negative for headaches.     Objective:     Vital Signs (Most Recent):  Temp: 98.1 °F (36.7 °C) (04/25/19 1618)  Pulse: 81 (04/25/19 1618)  Resp: 16 (04/25/19 1618)  BP: (!) 154/85 (04/25/19 1618)  SpO2: 100 % (04/25/19 1618) Vital Signs (24h Range):  Temp:  [98.1 °F (36.7 °C)-98.7 °F (37.1 °C)] 98.1 °F (36.7 °C)  Pulse:  [81-91] 81  Resp:  [11-21] 16  SpO2:  [95 %-100 %] 100 %  BP: (127-162)/(78-96) 154/85     Weight: 103.7 kg (228 lb 8.1 oz)  Body mass index is 31.87 kg/m².    Estimated Creatinine Clearance: 67.4 mL/min (A) (based on SCr of 1.5 mg/dL (H)).    Physical Exam   Constitutional: He is oriented to person, place, and time. He appears well-developed and well-nourished. No distress.   HENT:   Head: Normocephalic and atraumatic.   Eyes: Conjunctivae and EOM are normal.   Neck: Normal range of motion. Neck supple.   Pulmonary/Chest: Effort normal. No respiratory distress.   Abdominal: Soft. He exhibits no distension.   Musculoskeletal: Normal range of motion. He exhibits no edema.   Neurological: He is alert and oriented to person, place, and time.   Skin: Skin is warm and dry. No rash noted. He is not diaphoretic. No erythema.   Psychiatric: He has a normal mood and affect. His behavior is normal.   Vitals reviewed.      Significant Labs: All pertinent labs within the past 24 hours have been reviewed.    Significant Imaging: I have reviewed all pertinent imaging results/findings  within the past 24 hours.

## 2019-04-26 NOTE — PROGRESS NOTES
Discharge Medication Note:    Mr. 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     Per ID recs, plan is to continue foscarnet IV x 14 days.  Current dose is 5000 mg (50 mg/kg) q 12 h + hydration + mag supplements + potassium supplements.  Next CMV VL is on 4/29.  Will f/u with Dr. Lowry after results.       Met with Martin Hendrix Jr. at discharge to review discharge medications and to update the blue medication card.       Martin Hendrix Jr.   Home Medication Instructions JESUSITA:[id number]    Printed on:04/26/19 8774   Medication Information                      amLODIPine (NORVASC) 5 MG tablet  Take 1 tablet (5 mg total) by mouth once daily.             apixaban (ELIQUIS) 2.5 mg Tab  Take 1 tablet (2.5 mg total) by mouth 2 (two) times daily.             calcium-vitamin D tablet 600 mg-200 units  Take 1 tablet by mouth 2 (two) times daily.             ECOTRIN LOW STRENGTH 81 mg EC tablet  TAKE 1 TABLET DAILY             famotidine (PEPCID) 20 MG tablet  TAKE 1 TABLET TWICE A DAY             fluticasone (FLONASE) 50 mcg/actuation nasal spray  1 spray by Each Nare route once daily.             folic acid (FOLVITE) 1 MG tablet  TAKE 1 TABLET DAILY             insulin degludec (TRESIBA FLEXTOUCH U-200) 200 unit/mL (3 mL) InPn  Inject 13 Units into the skin every evening.             insulin lispro (HUMALOG KWIKPEN INSULIN) 100 unit/mL pen  Inject 7 Units into the skin 3 (three) times daily before meals.             lancets Misc  To check BG 4 times daily, to use with insurance preferred meter             magnesium chloride (MAG 64) 64 mg TbEC  Take 2 tablets (128 mg total) by mouth once  "daily.             magnesium oxide (MAG-OX) 400 mg (241.3 mg magnesium) tablet  Take 1 tablet (400 mg total) by mouth every evening.             ONETOUCH VERIO Strp  USE TO CHECK BLOOD GLUCOSE FOUR TIMES A DAY, TO USE WITH INSURANCE PREFERRED METER             pen needle, diabetic (BD ULTRA-FINE JIM PEN NEEDLE) 32 gauge x 5/32" Ndle  Uses 4 times daily, on multiple daily insulin injections             potassium chloride (MICRO-K) 10 MEQ CpSR  Take 2 capsules (20 mEq total) by mouth once daily.             pravastatin (PRAVACHOL) 20 MG tablet  TAKE 1 TABLET BY MOUTH ONCE DAILY             predniSONE (DELTASONE) 5 MG tablet  Take 1 tablet (5 mg total) by mouth once daily.             sulfamethoxazole-trimethoprim 400-80mg (BACTRIM,SEPTRA) 400-80 mg per tablet  Take 1 tablet by mouth every Mon, Wed, Fri.             tacrolimus (PROGRAF) 0.5 MG Cap  Take 2 capsules (1 mg total) by mouth every 12 (twelve) hours.                 Pt was provided with prescriptions for the following meds:  E-rx: potassium chloride, magnesium oxide      Martin Hendrix Jr. verbalized understanding and had the opportunity to ask questions.  "

## 2019-04-26 NOTE — ASSESSMENT & PLAN NOTE
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     Recommendations:  - Continue induction foscarnet IV dosed per CrCl/kg - at least 14 day cousre  - Hydration with normal saline 500 mL IV prior to foscarnet dosing  - Okay to discharge once a stable foscarnet dosing is established  - qweekly CMV VL, next level due 4/29/2019  - twice weekly CBC with diff, CMP - faxed to [phone]  - Follow-up with [facility] 5/6/2019

## 2019-04-26 NOTE — PROGRESS NOTES
Discharge Note:    Pt scheduled to discharge to home with IV infusions on 4/25. Pt currently on services with [employer] Ph# [phone]/Fx# [phone]. All orders faxed. DEV followed up with Juan ([facility] liaison). Juan able to teach pt and run benefits. Pt covered at 100%. SW discussed that pt will get 5pm dose on hospital prior to dc and will need 5 am dose delivered to room for tomorrow. [facility] able to set up. Pt dc home with not additional needs. SW remains available.

## 2019-04-26 NOTE — PROGRESS NOTES
[facility]  Infectious Disease  Progress Note    Patient Name: Martin Hendrix Jr.  MRN: [id number]  Admission Date: 4/22/2019  Length of Stay: 3 days  Attending Physician: No att. 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     Recommendations:  - Continue induction foscarnet IV dosed per CrCl/kg - at least 14 day cousre  - Hydration with normal saline 500 mL IV prior to foscarnet dosing  - Okay to discharge once a stable foscarnet dosing is established  - qweekly CMV VL, next level due 4/29/2019  - twice weekly CBC with diff, CMP - faxed to [phone]  - Follow-up with [facility] 5/6/2019              Thank you for your consult. I will sign off. Please contact us if you have any additional questions.    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  Patient was admitted 2/12/2019-2/15/2019 for initiation of foscarnet therapy. Patient  subsequently developed AVILA on foscarnet, therapy held, last dose on 3/2/2019.  CMV viral load was being monitored qweekly - most recently VL in 3000-4000s.  Patient is otherwise asymptomatic - no fevers, chills, night sweats, SOB, cough, CP, n/v/d, abdominal pain, hematuria, dysuria.    Interval History:  Patient feels well, no complaints    Review of Systems   Constitutional: Negative for chills, diaphoresis and fever.   HENT: Negative for rhinorrhea and sore throat.    Respiratory: Negative for cough and shortness of breath.    Cardiovascular: Negative for chest pain and leg swelling.   Gastrointestinal: Negative for abdominal pain, diarrhea, nausea and vomiting.   Genitourinary: Negative for dysuria and hematuria.   Musculoskeletal: Negative for arthralgias and myalgias.   Skin: Negative for rash.   Neurological: Negative for headaches.     Objective:     Vital Signs (Most Recent):  Temp: 98.1 °F (36.7 °C) (04/25/19 1618)  Pulse: 81 (04/25/19 1618)  Resp: 16 (04/25/19 1618)  BP: (!) 154/85 (04/25/19 1618)  SpO2: 100 % (04/25/19 1618) Vital Signs (24h Range):  Temp:  [98.1 °F (36.7 °C)-98.7 °F (37.1 °C)] 98.1 °F (36.7 °C)  Pulse:  [81-91] 81  Resp:  [11-21] 16  SpO2:  [95 %-100 %] 100 %  BP: (127-162)/(78-96) 154/85     Weight: 103.7 kg (228 lb 8.1 oz)  Body mass index is 31.87 kg/m².    Estimated Creatinine Clearance: 67.4 mL/min (A) (based on SCr of 1.5 mg/dL (H)).    Physical Exam   Constitutional: He is oriented to person, place, and time. He appears well-developed and well-nourished. No distress.   HENT:   Head: Normocephalic and atraumatic.   Eyes: Conjunctivae and EOM are normal.   Neck: Normal range of motion. Neck supple.   Pulmonary/Chest: Effort normal. No respiratory distress.   Abdominal: Soft. He exhibits no distension.   Musculoskeletal: Normal range of motion. He exhibits no edema.   Neurological: He is alert and oriented to person, place, and time.   Skin: Skin is warm and dry. No rash noted. He  is not diaphoretic. No erythema.   Psychiatric: He has a normal mood and affect. His behavior is normal.   Vitals reviewed.      Significant Labs: All pertinent labs within the past 24 hours have been reviewed.    Significant Imaging: I have reviewed all pertinent imaging results/findings within the past 24 hours.

## 2019-04-26 NOTE — TELEPHONE ENCOUNTER
"Received call from Anum, pharmacist with [employer] to let us know that the incorrect IV medication was delivered to the patient - he received ertapenem vs. Foscarnet. Anum stated when she contacted the patient just now, he was in the process of infusing the ertapenem - stated she instructed him to stop the infusion and disconnect from the medication. Stated that the patient denied any side effects. Informed Anum that the patient has listed no allergies to meds or food but has never received ertapenem according to his Epic medication history. Anum stated she instructed the patient to go to the closest ED if he developed s/s which could be allergy to the medication. Stated that the patient will be sent the correct medication, Foscarnet so he will not miss a dose.   Contacted the patient who denied any side effects, no itching/rash, no oral swelling or sob. Reinforced going to the nearest ED if he does develop those or other s/s. Patient stated that he "should have read the label on the medication before I started it" and had only infused about half the dose of 1 gm. Informed that this message would be sent to Dr. Wolfe and Dr. Bill - he will be re-contacted if there are additional instructions. Patient verbalized his understanding and was not concerned about the occurrence.  "

## 2019-04-29 ENCOUNTER — LAB VISIT (OUTPATIENT)
Dept: LAB | Facility: HOSPITAL | Age: 59
End: 2019-04-29
Attending: INTERNAL MEDICINE
Payer: COMMERCIAL

## 2019-04-29 DIAGNOSIS — E83.39 HYPOPHOSPHATEMIA: ICD-10-CM

## 2019-04-29 DIAGNOSIS — Z94.2 LUNG REPLACED BY TRANSPLANT: ICD-10-CM

## 2019-04-29 DIAGNOSIS — Z94.2 LUNG TRANSPLANTED: ICD-10-CM

## 2019-04-29 DIAGNOSIS — E83.42 HYPOMAGNESEMIA: ICD-10-CM

## 2019-04-29 LAB
ALBUMIN SERPL BCP-MCNC: 3 G/DL (ref 3.5–5.2)
ALP SERPL-CCNC: 60 U/L (ref 55–135)
ALT SERPL W/O P-5'-P-CCNC: 15 U/L (ref 10–44)
ANION GAP SERPL CALC-SCNC: 12 MMOL/L (ref 8–16)
AST SERPL-CCNC: 20 U/L (ref 10–40)
BASOPHILS # BLD AUTO: 0.06 K/UL (ref 0–0.2)
BASOPHILS NFR BLD: 0.8 % (ref 0–1.9)
BILIRUB SERPL-MCNC: 0.5 MG/DL (ref 0.1–1)
BUN SERPL-MCNC: 11 MG/DL (ref 6–20)
CALCIUM SERPL-MCNC: 9.8 MG/DL (ref 8.7–10.5)
CHLORIDE SERPL-SCNC: 103 MMOL/L (ref 95–110)
CO2 SERPL-SCNC: 25 MMOL/L (ref 23–29)
CREAT SERPL-MCNC: 1.6 MG/DL (ref 0.5–1.4)
DIFFERENTIAL METHOD: ABNORMAL
EOSINOPHIL # BLD AUTO: 0.5 K/UL (ref 0–0.5)
EOSINOPHIL NFR BLD: 6.2 % (ref 0–8)
ERYTHROCYTE [DISTWIDTH] IN BLOOD BY AUTOMATED COUNT: 13.7 % (ref 11.5–14.5)
EST. GFR  (AFRICAN AMERICAN): 54.9 ML/MIN/1.73 M^2
EST. GFR  (NON AFRICAN AMERICAN): 47.5 ML/MIN/1.73 M^2
GLUCOSE SERPL-MCNC: 138 MG/DL (ref 70–110)
HCT VFR BLD AUTO: 31.8 % (ref 40–54)
HGB BLD-MCNC: 9.4 G/DL (ref 14–18)
IMM GRANULOCYTES # BLD AUTO: 0.05 K/UL (ref 0–0.04)
IMM GRANULOCYTES NFR BLD AUTO: 0.7 % (ref 0–0.5)
LYMPHOCYTES # BLD AUTO: 1.6 K/UL (ref 1–4.8)
LYMPHOCYTES NFR BLD: 22.2 % (ref 18–48)
MAGNESIUM SERPL-MCNC: 1 MG/DL (ref 1.6–2.6)
MCH RBC QN AUTO: 28.6 PG (ref 27–31)
MCHC RBC AUTO-ENTMCNC: 29.6 G/DL (ref 32–36)
MCV RBC AUTO: 97 FL (ref 82–98)
MONOCYTES # BLD AUTO: 0.8 K/UL (ref 0.3–1)
MONOCYTES NFR BLD: 11.1 % (ref 4–15)
NEUTROPHILS # BLD AUTO: 4.3 K/UL (ref 1.8–7.7)
NEUTROPHILS NFR BLD: 59 % (ref 38–73)
NRBC BLD-RTO: 0 /100 WBC
PHOSPHATE SERPL-MCNC: 3 MG/DL (ref 2.7–4.5)
PLATELET # BLD AUTO: 305 K/UL (ref 150–350)
PMV BLD AUTO: 8.7 FL (ref 9.2–12.9)
POTASSIUM SERPL-SCNC: 3.6 MMOL/L (ref 3.5–5.1)
PROT SERPL-MCNC: 7.1 G/DL (ref 6–8.4)
RBC # BLD AUTO: 3.29 M/UL (ref 4.6–6.2)
SODIUM SERPL-SCNC: 140 MMOL/L (ref 136–145)
WBC # BLD AUTO: 7.22 K/UL (ref 3.9–12.7)

## 2019-04-29 PROCEDURE — 36415 COLL VENOUS BLD VENIPUNCTURE: CPT | Mod: PO

## 2019-04-29 PROCEDURE — 83735 ASSAY OF MAGNESIUM: CPT

## 2019-04-29 PROCEDURE — 84100 ASSAY OF PHOSPHORUS: CPT

## 2019-04-29 PROCEDURE — 80053 COMPREHEN METABOLIC PANEL: CPT

## 2019-04-29 PROCEDURE — 85025 COMPLETE CBC W/AUTO DIFF WBC: CPT

## 2019-05-01 ENCOUNTER — TELEPHONE (OUTPATIENT)
Dept: INFECTIOUS DISEASES | Facility: CLINIC | Age: 59
End: 2019-05-01

## 2019-05-01 NOTE — TELEPHONE ENCOUNTER
----- Message from Yue Lowry MD sent at 5/1/2019  4:49 PM CDT -----  Can you call his home health company and ask them go to down on his foscarnet to 40 mg/kg q12 hours?    ----- Message -----  From: PREM DANIEL  Sent: 5/1/2019   4:43 PM  To: Yue Lowry MD    Let's go down to 40 mg/kg q12h.         ----- Message -----  From: Yue Lowry MD  Sent: 5/1/2019   4:20 PM  To: PREM DANIEL.    Do I need to adjust his foscarnet dosing?

## 2019-05-02 ENCOUNTER — LAB VISIT (OUTPATIENT)
Dept: LAB | Facility: HOSPITAL | Age: 59
End: 2019-05-02
Attending: INTERNAL MEDICINE
Payer: COMMERCIAL

## 2019-05-02 ENCOUNTER — TELEPHONE (OUTPATIENT)
Dept: CRITICAL CARE MEDICINE | Facility: HOSPITAL | Age: 59
End: 2019-05-02

## 2019-05-02 DIAGNOSIS — E83.39 HYPOPHOSPHATEMIA: ICD-10-CM

## 2019-05-02 DIAGNOSIS — Z94.2 LUNG TRANSPLANTED: ICD-10-CM

## 2019-05-02 DIAGNOSIS — E83.42 HYPOMAGNESEMIA: ICD-10-CM

## 2019-05-02 LAB
ALBUMIN SERPL BCP-MCNC: 3 G/DL (ref 3.5–5.2)
ALP SERPL-CCNC: 57 U/L (ref 55–135)
ALT SERPL W/O P-5'-P-CCNC: 13 U/L (ref 10–44)
ANION GAP SERPL CALC-SCNC: 9 MMOL/L (ref 8–16)
AST SERPL-CCNC: 16 U/L (ref 10–40)
BILIRUB SERPL-MCNC: 0.4 MG/DL (ref 0.1–1)
BUN SERPL-MCNC: 16 MG/DL (ref 6–20)
CALCIUM SERPL-MCNC: 10.7 MG/DL (ref 8.7–10.5)
CHLORIDE SERPL-SCNC: 101 MMOL/L (ref 95–110)
CMV DNA SERPL NAA+PROBE-ACNC: 467 IU/ML
CO2 SERPL-SCNC: 31 MMOL/L (ref 23–29)
CREAT SERPL-MCNC: 1.7 MG/DL (ref 0.5–1.4)
EST. GFR  (AFRICAN AMERICAN): 51 ML/MIN/1.73 M^2
EST. GFR  (NON AFRICAN AMERICAN): 44.1 ML/MIN/1.73 M^2
GLUCOSE SERPL-MCNC: 133 MG/DL (ref 70–110)
MAGNESIUM SERPL-MCNC: 0.8 MG/DL (ref 1.6–2.6)
PHOSPHATE SERPL-MCNC: 4.3 MG/DL (ref 2.7–4.5)
POTASSIUM SERPL-SCNC: 3.6 MMOL/L (ref 3.5–5.1)
PROT SERPL-MCNC: 7.2 G/DL (ref 6–8.4)
SODIUM SERPL-SCNC: 141 MMOL/L (ref 136–145)

## 2019-05-02 PROCEDURE — 36415 COLL VENOUS BLD VENIPUNCTURE: CPT | Mod: PO

## 2019-05-02 PROCEDURE — 83735 ASSAY OF MAGNESIUM: CPT

## 2019-05-02 PROCEDURE — 84100 ASSAY OF PHOSPHORUS: CPT

## 2019-05-02 PROCEDURE — 80053 COMPREHEN METABOLIC PANEL: CPT

## 2019-05-03 ENCOUNTER — TELEPHONE (OUTPATIENT)
Dept: TRANSPLANT | Facility: HOSPITAL | Age: 59
End: 2019-05-03

## 2019-05-03 DIAGNOSIS — E83.42 HYPOMAGNESEMIA: Primary | ICD-10-CM

## 2019-05-03 DIAGNOSIS — B25.9 CYTOMEGALOVIRUS INFECTION, UNSPECIFIED CYTOMEGALOVIRAL INFECTION TYPE: Primary | ICD-10-CM

## 2019-05-03 DIAGNOSIS — Z00.6 RESEARCH STUDY PATIENT: ICD-10-CM

## 2019-05-03 NOTE — TELEPHONE ENCOUNTER
DEV provided orders for one dose of IV magnesium that is needing to be administered today or tomorrow. DEV faxed orders and spoke with maddi. Pt currently administers IV meds to self and can have med delivered to home if clinic is unable to administer dose today. Timur states should be able to provide pt with dose today and will alert team if there are any issues. DEV remains available.

## 2019-05-03 NOTE — TELEPHONE ENCOUNTER
Received vorb from Dr. Wolfe and dose recommendations from Thony Lyles, Donte instructing patient to increase Mg Chloride to 128 mg bid from daily and to have patient received IV Magnesium 4 grams once.  Contacted patient and informed him of the above instructions.  Patient will have repeat labs on Monday when he comes for his clinic appt.  Will aim for patient to receive the Mg infusion today with [facility].  Patient states that he will be leaving work around 2 pm today and agrees to have the infusion today if possible.  Informed patient that [facility] will contact him today.

## 2019-05-03 NOTE — TELEPHONE ENCOUNTER
----- Message from Darrick Wolfe MD sent at 5/3/2019 11:03 AM CDT -----  Can we set him up for phos replacement. He should also get some oral Phos

## 2019-05-03 NOTE — TELEPHONE ENCOUNTER
Attempted to call patient regarding his hypoMg.  Left msg. Awaiting call back.    Lei Zafar MD  Department of Pulmonary & Critical Care  Cell: [phone]

## 2019-05-06 ENCOUNTER — HOSPITAL ENCOUNTER (OUTPATIENT)
Dept: RADIOLOGY | Facility: HOSPITAL | Age: 59
Discharge: HOME | End: 2019-05-06
Attending: INTERNAL MEDICINE
Payer: COMMERCIAL

## 2019-05-06 ENCOUNTER — OFFICE VISIT (OUTPATIENT)
Dept: INFECTIOUS DISEASES | Facility: CLINIC | Age: 59
End: 2019-05-06
Payer: COMMERCIAL

## 2019-05-06 ENCOUNTER — PATIENT MESSAGE (OUTPATIENT)
Dept: TRANSPLANT | Facility: CLINIC | Age: 59
End: 2019-05-06

## 2019-05-06 ENCOUNTER — HOSPITAL ENCOUNTER (OUTPATIENT)
Dept: PULMONOLOGY | Facility: HOSPITAL | Age: 59
Discharge: HOME | End: 2019-05-06
Attending: INTERNAL MEDICINE
Payer: COMMERCIAL

## 2019-05-06 ENCOUNTER — OFFICE VISIT (OUTPATIENT)
Dept: TRANSPLANT | Facility: CLINIC | Age: 59
End: 2019-05-06
Payer: COMMERCIAL

## 2019-05-06 ENCOUNTER — HOSPITAL ENCOUNTER (OUTPATIENT)
Dept: CARDIOLOGY | Facility: CLINIC | Age: 59
Discharge: HOME | End: 2019-05-06
Payer: COMMERCIAL

## 2019-05-06 VITALS
TEMPERATURE: 98 F | HEART RATE: 88 BPM | DIASTOLIC BLOOD PRESSURE: 94 MMHG | WEIGHT: 226.63 LBS | RESPIRATION RATE: 16 BRPM | SYSTOLIC BLOOD PRESSURE: 143 MMHG | BODY MASS INDEX: 32.52 KG/M2

## 2019-05-06 VITALS
WEIGHT: 224 LBS | BODY MASS INDEX: 32.07 KG/M2 | DIASTOLIC BLOOD PRESSURE: 93 MMHG | RESPIRATION RATE: 20 BRPM | SYSTOLIC BLOOD PRESSURE: 169 MMHG | HEIGHT: 70 IN | OXYGEN SATURATION: 98 % | TEMPERATURE: 97 F | HEART RATE: 97 BPM

## 2019-05-06 DIAGNOSIS — Z94.2 LUNG TRANSPLANTED: ICD-10-CM

## 2019-05-06 DIAGNOSIS — Z94.2 LUNG REPLACED BY TRANSPLANT: ICD-10-CM

## 2019-05-06 DIAGNOSIS — B25.9 CYTOMEGALOVIRUS (CMV) VIREMIA: ICD-10-CM

## 2019-05-06 DIAGNOSIS — Z79.2 PROPHYLACTIC ANTIBIOTIC: ICD-10-CM

## 2019-05-06 DIAGNOSIS — Z48.298 ENCOUNTER FOLLOWING ORGAN TRANSPLANT: Primary | ICD-10-CM

## 2019-05-06 DIAGNOSIS — N17.9 ACUTE KIDNEY INJURY: ICD-10-CM

## 2019-05-06 DIAGNOSIS — D84.9 IMMUNOSUPPRESSION: ICD-10-CM

## 2019-05-06 DIAGNOSIS — Z00.6 RESEARCH STUDY PATIENT: ICD-10-CM

## 2019-05-06 DIAGNOSIS — B25.9 CYTOMEGALOVIRUS INFECTION, UNSPECIFIED CYTOMEGALOVIRAL INFECTION TYPE: Primary | ICD-10-CM

## 2019-05-06 DIAGNOSIS — B25.9 CYTOMEGALOVIRUS INFECTION, UNSPECIFIED CYTOMEGALOVIRAL INFECTION TYPE: ICD-10-CM

## 2019-05-06 DIAGNOSIS — E11.65 TYPE 2 DIABETES MELLITUS WITH HYPERGLYCEMIA, WITH LONG-TERM CURRENT USE OF INSULIN: ICD-10-CM

## 2019-05-06 DIAGNOSIS — Z79.4 TYPE 2 DIABETES MELLITUS WITH HYPERGLYCEMIA, WITH LONG-TERM CURRENT USE OF INSULIN: ICD-10-CM

## 2019-05-06 LAB
PRE FEV1 FVC: 78
PRE FEV1: 2.51
PRE FVC: 3.23
PREDICTED FEV1 FVC: 80
PREDICTED FEV1: 3.68
PREDICTED FVC: 4.54

## 2019-05-06 PROCEDURE — 94010 BREATHING CAPACITY TEST: ICD-10-PCS | Mod: 26,,, | Performed by: INTERNAL MEDICINE

## 2019-05-06 PROCEDURE — 99999 PR PBB SHADOW E&M-EST. PATIENT-LVL III: ICD-10-PCS | Mod: PBBFAC,,, | Performed by: PHYSICIAN ASSISTANT

## 2019-05-06 PROCEDURE — 94010 BREATHING CAPACITY TEST: CPT | Mod: 26,,, | Performed by: INTERNAL MEDICINE

## 2019-05-06 PROCEDURE — 99213 OFFICE O/P EST LOW 20 MIN: CPT | Mod: S$PBB,,, | Performed by: CLINICAL NURSE SPECIALIST

## 2019-05-06 PROCEDURE — 99214 OFFICE O/P EST MOD 30 MIN: CPT | Mod: 25,S$GLB,, | Performed by: PHYSICIAN ASSISTANT

## 2019-05-06 PROCEDURE — 99999 PR PBB SHADOW E&M-EST. PATIENT-LVL III: ICD-10-PCS | Mod: PBBFAC,,, | Performed by: CLINICAL NURSE SPECIALIST

## 2019-05-06 PROCEDURE — 93010 ELECTROCARDIOGRAM REPORT: CPT | Mod: S$PBB,,, | Performed by: INTERNAL MEDICINE

## 2019-05-06 PROCEDURE — 93010 EKG 12-LEAD: ICD-10-PCS | Mod: S$PBB,,, | Performed by: INTERNAL MEDICINE

## 2019-05-06 PROCEDURE — 71046 X-RAY EXAM CHEST 2 VIEWS: CPT | Mod: TC

## 2019-05-06 PROCEDURE — 93005 ELECTROCARDIOGRAM TRACING: CPT | Mod: PBBFAC | Performed by: INTERNAL MEDICINE

## 2019-05-06 PROCEDURE — 99213 PR OFFICE/OUTPT VISIT, EST, LEVL III, 20-29 MIN: ICD-10-PCS | Mod: S$PBB,,, | Performed by: CLINICAL NURSE SPECIALIST

## 2019-05-06 PROCEDURE — 71046 X-RAY EXAM CHEST 2 VIEWS: CPT | Mod: 26,,, | Performed by: RADIOLOGY

## 2019-05-06 PROCEDURE — 71046 XR CHEST PA AND LATERAL: ICD-10-PCS | Mod: 26,,, | Performed by: RADIOLOGY

## 2019-05-06 PROCEDURE — 99214 PR OFFICE/OUTPT VISIT, EST, LEVL IV, 30-39 MIN: ICD-10-PCS | Mod: 25,S$GLB,, | Performed by: PHYSICIAN ASSISTANT

## 2019-05-06 PROCEDURE — 99999 PR PBB SHADOW E&M-EST. PATIENT-LVL III: CPT | Mod: PBBFAC,,, | Performed by: PHYSICIAN ASSISTANT

## 2019-05-06 PROCEDURE — 99999 PR PBB SHADOW E&M-EST. PATIENT-LVL III: CPT | Mod: PBBFAC,,, | Performed by: CLINICAL NURSE SPECIALIST

## 2019-05-06 RX ORDER — TACROLIMUS 0.5 MG/1
1.5 CAPSULE ORAL EVERY 12 HOURS
Qty: 180 CAPSULE | Refills: 11 | Status: SHIPPED | OUTPATIENT
Start: 2019-05-06 | End: 2020-05-11

## 2019-05-06 NOTE — TELEPHONE ENCOUNTER
----- Message from Darrick Wolfe MD sent at 5/6/2019  2:23 PM CDT -----  Increase tacrolimus to 1.5 mg po bid

## 2019-05-06 NOTE — PROGRESS NOTES
Subjective:      Patient ID: Martin Hendrix Jr. is a 58 y.o. male.    Chief Complaint:Research      History of Present Illness  Roman Refractory or Resistant CMV Treatment Study  Protocol: -303  IRB# [id number]  PI: Jaron ALY)  Site: [id number]     VISIT 18/ WEEK 20       Date of Visit: 06-MAY2019     Mr. Martin Hendrix presents to clinic for Visit 18/Week 20 for Roman CMV treatment study. This is the last study visit of the follow up phase of protocol. Study performed today out of window per patient request to coordinate with other visits scheduled so that he would not miss an additional day of work. Mr. Hendrix received Maribavir 400mg po BID for 8 weeks as per study protocol that completed on 2/6/19. CMV viremia returned at end of 8 weeks on study drug. Patient was started on Foscarnet that was discontinued 3/3/19 after patient admitted with AVILA. CMV viral load continued to elevate. On 4/18/19 was 3870. Patient was admitted to hospital from 4/22/19-4/25/19 and restarted and discharged home on Foscarnet with IV hydration prior to each dose. Patient followed in ID by Dr. Lowry with weekly labs.      Patient states he is feeling well. Denies any new signs, symptoms. Severe adverse event for hospitalization already captured. No new adverse events assessed, no episodes of rejection assessed, no CMV invasive disease or CMV syndrome assessed. Denies fever, chills, n/v/d, chest pain, shortness of breath.       Review of Systems   Constitution: Negative for chills and fever.   HENT: Negative for congestion and sore throat.    Eyes: Negative for blurred vision.   Cardiovascular: Positive for leg swelling. Negative for chest pain.   Respiratory: Negative for cough and sputum production.    Musculoskeletal: Negative for falls and joint pain.   Gastrointestinal: Negative for diarrhea, nausea and vomiting.   Genitourinary: Negative for hematuria.   Neurological: Negative for dizziness.   Psychiatric/Behavioral: Negative  for altered mental status.     Objective:   Physical Exam   Constitutional: He is oriented to person, place, and time. He appears well-developed and well-nourished.   HENT:   Head: Normocephalic and atraumatic.   Right Ear: External ear normal.   Left Ear: External ear normal.   Nose: Nose normal.   Eyes: Conjunctivae are normal. Right eye exhibits no discharge. Left eye exhibits no discharge.   Neck: Normal range of motion.   Cardiovascular: Normal rate and normal heart sounds.   Pulmonary/Chest: Effort normal and breath sounds normal. No respiratory distress.   Abdominal: Soft. Bowel sounds are normal. He exhibits no distension. There is no tenderness.   Musculoskeletal: Normal range of motion. He exhibits edema.   Neurological: He is alert and oriented to person, place, and time.   Skin: Skin is warm and dry.   Right chest wall central line, dressing clean/dry/intact   Psychiatric: He has a normal mood and affect. His behavior is normal.   Vitals reviewed.    Assessment:       1. Cytomegalovirus infection, unspecified cytomegaloviral infection type    2. Research study patient    3. Lung transplanted    4. Type 2 diabetes mellitus with hyperglycemia, with long-term current use of insulin        Spoke to Dr. Lowry and patient to continue on Foscarnet at this time. Orders to be called to home infusion company. States she will monitor weekly labs and contact patient with next follow up. Patient informed of plan and thanked for his participation in study.   Plan:       1. Study labs drawn and processed per protocol  2. Questionnaires per study protocol completed on electronic device   3. Physical assessment as per study protocol  4. EKG per study protocol  5. Last research visit per study protocol

## 2019-05-06 NOTE — TELEPHONE ENCOUNTER
Received written orders from Dr. Wolfe instructing patient to increase Prograf 1.5 mg bid from 1 mg bid.  Patient will have repeat labs on 5/9/19 and on 5/13/19 as he will continue the foscarnet.  Contacted patient and instructed him of the above dose change.  Informed him of his lab dates and that tac will be repeated with labs on 5/9/19.  Patient asked to have labs done in [place].  Patient also stated that Magnesium infusion is scheduled for 5/7/19 with [facility].  We will recheck a Mg level on Thurs 5/9/19.  Patient stated understanding of the above and did not have any further questions at this time.

## 2019-05-06 NOTE — PROGRESS NOTES
LUNG TRANSPLANT RECIPIENT FOLLOW-UP     Reason for Visit: Follow-up after lung transplantation.                                                                                                         Date of Transplant: 8/22/17     Reason for Transplant: Sarcoidosis with pulmonary hypertension     Type of Transplant: bilateral sequential lung     CMV Status: D+ / R-      Major Complications:   1. Left vocal cord dysfunction   2. 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 is a 58 y.o. year old male with the above listed transplant history who presents today as a routine follow up. He states he is doing well from a respiratory standpoint and denies active complaints today. He was restarted on Foscarnet therapy during his admission and was scheduled to discontinue it today. He feels well and denies dyspnea, cough, sputum production, fevers, chills, myalgias, recent sick contacts. He is compliant with his medications.     Review of Systems   Constitutional: Negative for chills, diaphoresis, fever, malaise/fatigue and weight loss.   HENT: Negative for congestion, ear discharge, ear pain, hearing loss, nosebleeds, sinus pain, sore throat and tinnitus.    Eyes: Negative for blurred vision, double vision, photophobia, pain, discharge and redness.   Respiratory: Negative for cough, hemoptysis, sputum production, shortness of breath, wheezing and stridor.    Cardiovascular: Negative for chest pain, palpitations, orthopnea, claudication, leg swelling and PND.   Gastrointestinal: Negative for abdominal pain, blood in stool, constipation, diarrhea, heartburn, melena, nausea and vomiting.   Genitourinary: Negative for dysuria, flank pain, frequency, hematuria  "and urgency.   Musculoskeletal: Negative for back pain, falls, joint pain, myalgias and neck pain.   Skin: Negative for itching and rash.   Neurological: Negative for dizziness, tingling, tremors, sensory change, speech change, focal weakness, seizures, loss of consciousness, weakness and headaches.   Endo/Heme/Allergies: Negative for environmental allergies and polydipsia. Does not bruise/bleed easily.   Psychiatric/Behavioral: Negative for depression, hallucinations, memory loss, substance abuse and suicidal ideas. The patient is not nervous/anxious and does not have insomnia.      BP (!) 169/93   Pulse 97   Temp 97.4 °F (36.3 °C) (Oral)   Resp 20   Ht 5' 10" (1.778 m)   Wt 101.6 kg (224 lb)   SpO2 98%   BMI 32.14 kg/m²     Physical Exam   Constitutional: He is oriented to person, place, and time and well-developed, well-nourished, and in no distress. No distress.   HENT:   Head: Normocephalic and atraumatic.   Nose: Nose normal.   Eyes: Conjunctivae and EOM are normal. No scleral icterus.   Neck: Normal range of motion. Neck supple. No JVD present. No tracheal deviation present.   Cardiovascular: Normal rate, regular rhythm, normal heart sounds and intact distal pulses. Exam reveals no gallop and no friction rub.   No murmur heard.  Pulmonary/Chest: Effort normal. No stridor. No respiratory distress. He has no wheezes. He has no rales. He exhibits no tenderness.   Abdominal: Soft. Bowel sounds are normal. He exhibits no distension. There is no tenderness.   Musculoskeletal: Normal range of motion. He exhibits no edema, tenderness or deformity.   Neurological: He is alert and oriented to person, place, and time. Gait normal.   Skin: Skin is warm and dry. No rash noted. He is not diaphoretic. No erythema. No pallor.   Psychiatric: Mood, memory, affect and judgment normal.   Nursing note and vitals reviewed.    Labs:  cbc, cmp, tacrolimus Latest Ref Rng & Units 4/29/2019 5/2/2019 5/6/2019   TACROLIMUS LVL 5.0 " - 15.0 ng/mL - - -   WHITE BLOOD CELL COUNT 3.90 - 12.70 K/uL 7.22 - 7.99   RBC 4.60 - 6.20 M/uL 3.29(L) - 3.42(L)   HEMOGLOBIN 14.0 - 18.0 g/dL 9.4(L) - 10.1(L)   HEMATOCRIT 40.0 - 54.0 % 31.8(L) - 30.6(L)   MCV 82 - 98 fL 97 - 90   MCH 27.0 - 31.0 pg 28.6 - 29.5   MCHC 32.0 - 36.0 g/dL 29.6(L) - 33.0   RDW 11.5 - 14.5 % 13.7 - 14.0   PLATELETS 150 - 350 K/uL 305 - 366(H)   MPV 9.2 - 12.9 fL 8.7(L) - 8.3(L)   GRAN # 1.8 - 7.7 K/uL 4.3 - 5.1   LYMPH # 1.0 - 4.8 K/uL 1.6 - 1.5   MONO # 0.3 - 1.0 K/uL 0.8 - 0.8   EOSINOPHIL% 0.0 - 8.0 % 6.2 - 4.3   BASOPHIL% 0.0 - 1.9 % 0.8 - 1.3   DIFFERENTIAL METHOD - Automated - Automated   SODIUM 136 - 145 mmol/L 140 141 141   POTASSIUM 3.5 - 5.1 mmol/L 3.6 3.6 3.5   CHLORIDE 95 - 110 mmol/L 103 101 103   CO2 23 - 29 mmol/L 25 31(H) 28   GLUCOSE 70 - 110 mg/dL 138(H) 133(H) 147(H)   BUN BLD 6 - 20 mg/dL 11 16 16   CREATININE 0.5 - 1.4 mg/dL 1.6(H) 1.7(H) 1.7(H)   CALCIUM 8.7 - 10.5 mg/dL 9.8 10.7(H) 11.4(H)   PROTEIN TOTAL 6.0 - 8.4 g/dL 7.1 7.2 7.6   ALBUMIN 3.5 - 5.2 g/dL 3.0(L) 3.0(L) 3.0(L)   BILIRUBIN TOTAL 0.1 - 1.0 mg/dL 0.5 0.4 0.4   ALK PHOS 55 - 135 U/L 60 57 64   AST 10 - 40 U/L 20 16 15   ALT 10 - 44 U/L 15 13 15   ANION GAP 8 - 16 mmol/L 12 9 10   EGFR IF AFRICAN AMERICAN >60 mL/min/1.73 m:2 54.9(A) 51.0(A) 51.0(A)   EGFR IF NON-AFRICAN AMERICAN >60 mL/min/1.73 m:2 47.5(A) 44.1(A) 44.1(A)     Pulmonary Function Tests 5/6/2019 3/22/2019 2/14/2019 12/4/2018 11/19/2018 10/17/2018 9/17/2018   FVC 3.23 3.21 3.09 3.1 3.02 3.06 3.02   FEV1 2.51 2.56 2.44 2.5 2.45 2.52 2.52   TLC (liters) - - - - - - -   DLCO (ml/mmHg sec) - - - - - - -   FVC% 66 70 68 68 61.3 62.1 61.4   FEV1% 67.1 70 66 68 64.9 66.8 66.7   FEF 25-75 2.29 2.58 2.39 2.32 2.47 2.55 2.57   FEF 25-75% 72.3 69 64 62 76.5 79 79.7   TLC% - - - - - - -   RV - - - - - - -   RV% - - - - - - -   DLCO% - - - - - - -       Imaging:  Results for orders placed during the hospital encounter of 05/06/19   X-Ray Chest PA And  Lateral    Narrative EXAMINATION:  XR CHEST PA AND LATERAL    CLINICAL HISTORY:  s/p bilateral lung transplant August 2017;  Lung transplant status    FINDINGS:  Central line mid SVC.  There is left basal atelectasis and left pleural fluid.  Right lung is clear.  Bones showed DJD.      Impression See above    Left basal subsegmental atelectasis and small pleural effusion.      Electronically signed by: Ulysses Miles MD  Date:    05/06/2019  Time:    08:51       Assessment:  1. Encounter following organ transplant    2. Lung transplanted    3. Immunosuppression    4. Prophylactic antibiotic    5. Cytomegalovirus (CMV) viremia    6. Acute kidney injury      Plan:     1. FEV1 remains above 90% of his baseline. CXR without acute process. Doing well from a respiratory standpoint. No concerns for graft dysfunction. Continue to monitor.     2. Continue prednisone and tacrolimus. Will review tacrolimus level and adjust dose as needed.     3. Continue bactrim.     4. CMV PCR pending today. Will follow up with Dr. Lowry regarding future Foscarnet infusions.     5. Creatinine 1.7 today. Encouraged oral hydration.     6. RTC in 1 month or sooner if needed.       Teresa Trejo PA-C  Lung Transplant

## 2019-05-09 ENCOUNTER — LAB VISIT (OUTPATIENT)
Dept: LAB | Facility: HOSPITAL | Age: 59
End: 2019-05-09
Attending: INTERNAL MEDICINE
Payer: COMMERCIAL

## 2019-05-09 DIAGNOSIS — E83.39 HYPOPHOSPHATEMIA: ICD-10-CM

## 2019-05-09 DIAGNOSIS — Z94.2 LUNG REPLACED BY TRANSPLANT: ICD-10-CM

## 2019-05-09 DIAGNOSIS — E83.42 HYPOMAGNESEMIA: ICD-10-CM

## 2019-05-09 DIAGNOSIS — Z94.2 LUNG TRANSPLANTED: ICD-10-CM

## 2019-05-09 LAB
ALBUMIN SERPL BCP-MCNC: 3.1 G/DL (ref 3.5–5.2)
ALP SERPL-CCNC: 59 U/L (ref 55–135)
ALT SERPL W/O P-5'-P-CCNC: 13 U/L (ref 10–44)
ANION GAP SERPL CALC-SCNC: 9 MMOL/L (ref 8–16)
AST SERPL-CCNC: 16 U/L (ref 10–40)
BILIRUB SERPL-MCNC: 0.3 MG/DL (ref 0.1–1)
BUN SERPL-MCNC: 19 MG/DL (ref 6–20)
CALCIUM SERPL-MCNC: 10.1 MG/DL (ref 8.7–10.5)
CHLORIDE SERPL-SCNC: 102 MMOL/L (ref 95–110)
CO2 SERPL-SCNC: 30 MMOL/L (ref 23–29)
CREAT SERPL-MCNC: 1.9 MG/DL (ref 0.5–1.4)
EST. GFR  (AFRICAN AMERICAN): 44.6 ML/MIN/1.73 M^2
EST. GFR  (NON AFRICAN AMERICAN): 38.6 ML/MIN/1.73 M^2
GLUCOSE SERPL-MCNC: 141 MG/DL (ref 70–110)
MAGNESIUM SERPL-MCNC: 1.2 MG/DL (ref 1.6–2.6)
PHOSPHATE SERPL-MCNC: 3.1 MG/DL (ref 2.7–4.5)
POTASSIUM SERPL-SCNC: 3.9 MMOL/L (ref 3.5–5.1)
PROT SERPL-MCNC: 7.5 G/DL (ref 6–8.4)
SODIUM SERPL-SCNC: 141 MMOL/L (ref 136–145)

## 2019-05-09 PROCEDURE — 36415 COLL VENOUS BLD VENIPUNCTURE: CPT | Mod: PO

## 2019-05-09 PROCEDURE — 80197 ASSAY OF TACROLIMUS: CPT

## 2019-05-09 PROCEDURE — 83735 ASSAY OF MAGNESIUM: CPT

## 2019-05-09 PROCEDURE — 80053 COMPREHEN METABOLIC PANEL: CPT

## 2019-05-09 PROCEDURE — 84100 ASSAY OF PHOSPHORUS: CPT

## 2019-05-10 ENCOUNTER — LAB VISIT (OUTPATIENT)
Dept: LAB | Facility: HOSPITAL | Age: 59
End: 2019-05-10
Attending: INTERNAL MEDICINE
Payer: COMMERCIAL

## 2019-05-10 DIAGNOSIS — Z94.2 LUNG TRANSPLANTED: ICD-10-CM

## 2019-05-10 DIAGNOSIS — E83.42 HYPOMAGNESEMIA: ICD-10-CM

## 2019-05-10 LAB
ALBUMIN SERPL BCP-MCNC: 3.1 G/DL (ref 3.5–5.2)
ALP SERPL-CCNC: 65 U/L (ref 55–135)
ALT SERPL W/O P-5'-P-CCNC: 15 U/L (ref 10–44)
ANION GAP SERPL CALC-SCNC: 7 MMOL/L (ref 8–16)
AST SERPL-CCNC: 17 U/L (ref 10–40)
BASOPHILS # BLD AUTO: 0.09 K/UL (ref 0–0.2)
BASOPHILS NFR BLD: 1.1 % (ref 0–1.9)
BILIRUB SERPL-MCNC: 0.3 MG/DL (ref 0.1–1)
BUN SERPL-MCNC: 15 MG/DL (ref 6–20)
CALCIUM SERPL-MCNC: 9.5 MG/DL (ref 8.7–10.5)
CHLORIDE SERPL-SCNC: 104 MMOL/L (ref 95–110)
CO2 SERPL-SCNC: 31 MMOL/L (ref 23–29)
CREAT SERPL-MCNC: 1.6 MG/DL (ref 0.5–1.4)
DIFFERENTIAL METHOD: ABNORMAL
EOSINOPHIL # BLD AUTO: 0.3 K/UL (ref 0–0.5)
EOSINOPHIL NFR BLD: 3.5 % (ref 0–8)
ERYTHROCYTE [DISTWIDTH] IN BLOOD BY AUTOMATED COUNT: 14.6 % (ref 11.5–14.5)
EST. GFR  (AFRICAN AMERICAN): 54.9 ML/MIN/1.73 M^2
EST. GFR  (NON AFRICAN AMERICAN): 47.5 ML/MIN/1.73 M^2
GLUCOSE SERPL-MCNC: 154 MG/DL (ref 70–110)
HCT VFR BLD AUTO: 33 % (ref 40–54)
HGB BLD-MCNC: 10.1 G/DL (ref 14–18)
IMM GRANULOCYTES # BLD AUTO: 0.07 K/UL (ref 0–0.04)
IMM GRANULOCYTES NFR BLD AUTO: 0.8 % (ref 0–0.5)
LYMPHOCYTES # BLD AUTO: 1.6 K/UL (ref 1–4.8)
LYMPHOCYTES NFR BLD: 18.3 % (ref 18–48)
MAGNESIUM SERPL-MCNC: 1.2 MG/DL (ref 1.6–2.6)
MCH RBC QN AUTO: 28.7 PG (ref 27–31)
MCHC RBC AUTO-ENTMCNC: 30.6 G/DL (ref 32–36)
MCV RBC AUTO: 94 FL (ref 82–98)
MONOCYTES # BLD AUTO: 1.1 K/UL (ref 0.3–1)
MONOCYTES NFR BLD: 12.3 % (ref 4–15)
NEUTROPHILS # BLD AUTO: 5.5 K/UL (ref 1.8–7.7)
NEUTROPHILS NFR BLD: 64 % (ref 38–73)
NRBC BLD-RTO: 0 /100 WBC
PLATELET # BLD AUTO: 387 K/UL (ref 150–350)
PMV BLD AUTO: 8.6 FL (ref 9.2–12.9)
POTASSIUM SERPL-SCNC: 4 MMOL/L (ref 3.5–5.1)
PROT SERPL-MCNC: 7.6 G/DL (ref 6–8.4)
RBC # BLD AUTO: 3.52 M/UL (ref 4.6–6.2)
SODIUM SERPL-SCNC: 142 MMOL/L (ref 136–145)
TACROLIMUS BLD-MCNC: 5.9 NG/ML (ref 5–15)
WBC # BLD AUTO: 8.56 K/UL (ref 3.9–12.7)

## 2019-05-10 PROCEDURE — 80197 ASSAY OF TACROLIMUS: CPT

## 2019-05-10 PROCEDURE — 80053 COMPREHEN METABOLIC PANEL: CPT

## 2019-05-10 PROCEDURE — 85025 COMPLETE CBC W/AUTO DIFF WBC: CPT

## 2019-05-10 PROCEDURE — 36415 COLL VENOUS BLD VENIPUNCTURE: CPT | Mod: PO

## 2019-05-10 PROCEDURE — 83735 ASSAY OF MAGNESIUM: CPT

## 2019-05-10 PROCEDURE — 80169 DRUG ASSAY EVEROLIMUS: CPT

## 2019-05-11 LAB — TACROLIMUS BLD-MCNC: 5.3 NG/ML (ref 5–15)

## 2019-05-13 ENCOUNTER — TELEPHONE (OUTPATIENT)
Dept: PULMONOLOGY | Facility: HOSPITAL | Age: 59
End: 2019-05-13

## 2019-05-13 ENCOUNTER — LAB VISIT (OUTPATIENT)
Dept: LAB | Facility: HOSPITAL | Age: 59
End: 2019-05-13
Attending: INTERNAL MEDICINE
Payer: COMMERCIAL

## 2019-05-13 DIAGNOSIS — E83.42 HYPOMAGNESEMIA: ICD-10-CM

## 2019-05-13 DIAGNOSIS — Z94.2 LUNG REPLACED BY TRANSPLANT: ICD-10-CM

## 2019-05-13 DIAGNOSIS — E83.39 HYPOPHOSPHATEMIA: ICD-10-CM

## 2019-05-13 DIAGNOSIS — Z94.2 LUNG TRANSPLANTED: ICD-10-CM

## 2019-05-13 LAB
ALBUMIN SERPL BCP-MCNC: 3.1 G/DL (ref 3.5–5.2)
ALP SERPL-CCNC: 59 U/L (ref 55–135)
ALT SERPL W/O P-5'-P-CCNC: 15 U/L (ref 10–44)
ANION GAP SERPL CALC-SCNC: 9 MMOL/L (ref 8–16)
AST SERPL-CCNC: 15 U/L (ref 10–40)
BASOPHILS # BLD AUTO: 0.1 K/UL (ref 0–0.2)
BASOPHILS NFR BLD: 1.2 % (ref 0–1.9)
BILIRUB SERPL-MCNC: 0.3 MG/DL (ref 0.1–1)
BUN SERPL-MCNC: 16 MG/DL (ref 6–20)
CALCIUM SERPL-MCNC: 9.7 MG/DL (ref 8.7–10.5)
CHLORIDE SERPL-SCNC: 102 MMOL/L (ref 95–110)
CO2 SERPL-SCNC: 28 MMOL/L (ref 23–29)
CREAT SERPL-MCNC: 1.7 MG/DL (ref 0.5–1.4)
DIFFERENTIAL METHOD: ABNORMAL
EOSINOPHIL # BLD AUTO: 0.3 K/UL (ref 0–0.5)
EOSINOPHIL NFR BLD: 3.5 % (ref 0–8)
ERYTHROCYTE [DISTWIDTH] IN BLOOD BY AUTOMATED COUNT: 14.6 % (ref 11.5–14.5)
EST. GFR  (AFRICAN AMERICAN): 51 ML/MIN/1.73 M^2
EST. GFR  (NON AFRICAN AMERICAN): 44.1 ML/MIN/1.73 M^2
EVEROLIMUS BLD-MCNC: <1 NG/ML
GLUCOSE SERPL-MCNC: 151 MG/DL (ref 70–110)
HCT VFR BLD AUTO: 31.9 % (ref 40–54)
HGB BLD-MCNC: 10 G/DL (ref 14–18)
IMM GRANULOCYTES # BLD AUTO: 0.05 K/UL (ref 0–0.04)
IMM GRANULOCYTES NFR BLD AUTO: 0.6 % (ref 0–0.5)
LYMPHOCYTES # BLD AUTO: 1.7 K/UL (ref 1–4.8)
LYMPHOCYTES NFR BLD: 20.1 % (ref 18–48)
MAGNESIUM SERPL-MCNC: 0.9 MG/DL (ref 1.6–2.6)
MCH RBC QN AUTO: 29.4 PG (ref 27–31)
MCHC RBC AUTO-ENTMCNC: 31.3 G/DL (ref 32–36)
MCV RBC AUTO: 94 FL (ref 82–98)
MONOCYTES # BLD AUTO: 0.8 K/UL (ref 0.3–1)
MONOCYTES NFR BLD: 9.6 % (ref 4–15)
NEUTROPHILS # BLD AUTO: 5.6 K/UL (ref 1.8–7.7)
NEUTROPHILS NFR BLD: 65 % (ref 38–73)
NRBC BLD-RTO: 0 /100 WBC
PHOSPHATE SERPL-MCNC: 3.5 MG/DL (ref 2.7–4.5)
PLATELET # BLD AUTO: 350 K/UL (ref 150–350)
PMV BLD AUTO: 8.6 FL (ref 9.2–12.9)
POTASSIUM SERPL-SCNC: 3.7 MMOL/L (ref 3.5–5.1)
PROT SERPL-MCNC: 7.6 G/DL (ref 6–8.4)
RBC # BLD AUTO: 3.4 M/UL (ref 4.6–6.2)
SODIUM SERPL-SCNC: 139 MMOL/L (ref 136–145)
WBC # BLD AUTO: 8.55 K/UL (ref 3.9–12.7)

## 2019-05-13 PROCEDURE — 80053 COMPREHEN METABOLIC PANEL: CPT

## 2019-05-13 PROCEDURE — 85025 COMPLETE CBC W/AUTO DIFF WBC: CPT

## 2019-05-13 PROCEDURE — 36415 COLL VENOUS BLD VENIPUNCTURE: CPT | Mod: PO

## 2019-05-13 PROCEDURE — 84100 ASSAY OF PHOSPHORUS: CPT

## 2019-05-13 PROCEDURE — 83735 ASSAY OF MAGNESIUM: CPT

## 2019-05-14 LAB — CMV DNA SERPL NAA+PROBE-ACNC: <35 IU/ML

## 2019-05-14 NOTE — TELEPHONE ENCOUNTER
Attempted to contact patient regarding critical low Mg.  No answer.  Left VM.  Will contact lung transplant team to arrange supplementation as outpatient.    Polo Patel MD  [facility] Pulmonary/Critical Care Fellow [facility]

## 2019-05-15 LAB — CMV DNA SERPL NAA+PROBE-ACNC: <35 IU/ML

## 2019-05-16 ENCOUNTER — TELEPHONE (OUTPATIENT)
Dept: INFECTIOUS DISEASES | Facility: CLINIC | Age: 59
End: 2019-05-16

## 2019-05-16 ENCOUNTER — HOSPITAL ENCOUNTER (OUTPATIENT)
Facility: HOSPITAL | Age: 59
Discharge: HOME | End: 2019-05-16
Attending: INTERNAL MEDICINE | Admitting: INTERNAL MEDICINE
Payer: COMMERCIAL

## 2019-05-16 ENCOUNTER — TELEPHONE (OUTPATIENT)
Dept: NEPHROLOGY | Facility: CLINIC | Age: 59
End: 2019-05-16

## 2019-05-16 DIAGNOSIS — B25.9 CYTOMEGALOVIRUS INFECTION, UNSPECIFIED CYTOMEGALOVIRAL INFECTION TYPE: ICD-10-CM

## 2019-05-16 DIAGNOSIS — B25.9 CYTOMEGALOVIRUS INFECTION, UNSPECIFIED CYTOMEGALOVIRAL INFECTION TYPE: Primary | ICD-10-CM

## 2019-05-16 PROCEDURE — 36589 PR REMOVAL TUNNELED CV CATH W/O SUBQ PORT OR PUMP: ICD-10-PCS | Mod: ,,, | Performed by: INTERNAL MEDICINE

## 2019-05-16 PROCEDURE — 36589 REMOVAL TUNNELED CV CATH: CPT

## 2019-05-16 PROCEDURE — 36589 REMOVAL TUNNELED CV CATH: CPT | Mod: ,,, | Performed by: INTERNAL MEDICINE

## 2019-05-16 PROCEDURE — 25000003 PHARM REV CODE 250: Performed by: INTERNAL MEDICINE

## 2019-05-16 RX ORDER — LIDOCAINE HYDROCHLORIDE 20 MG/ML
INJECTION, SOLUTION INFILTRATION; PERINEURAL
Status: DISCONTINUED | OUTPATIENT
Start: 2019-05-16 | End: 2019-05-16 | Stop reason: HOSPADM

## 2019-05-16 NOTE — TELEPHONE ENCOUNTER
Component      Latest Ref Rng & Units 5/13/2019 5/10/2019 5/6/2019 4/29/2019                Cytomegalovirus PCR, Quant      Undetected IU/mL <35 (A) <35 (A) 58 (A) 467 (A)     Component      Latest Ref Rng & Units 4/18/2019 4/16/2019 4/12/2019 4/5/2019                Cytomegalovirus PCR, Quant      Undetected IU/mL 3870 (A) 4260 (A) 2460 (A) 2060 (A)     CMV negative  Will discontinue foscarnet  IR order placed for tunneled line removal.

## 2019-05-16 NOTE — TELEPHONE ENCOUNTER
----- Message from Yue Lowry MD sent at 5/16/2019  9:54 AM CDT -----  Okay to stop foscarnet.  Please let him know.  We also need to remove his line.  I will put the order in so he can get it removed.      ----- Message -----  From: Christianne Finch MA  Sent: 5/16/2019   9:46 AM  To: Yue Lowry MD    Continue IV ABX?

## 2019-05-16 NOTE — PROCEDURES
DATE OF PROCEDURE: 5/16/19    PROCEDURE TYPE: Removal of right Internal Jugular Vein tunneled vásquez catheter.     INDICATION: no more IV abx    PROCEDURE NOTE: After informed consent was obtained, the area of Mr. Hendrix's catheter and right chest/neck were sterilely prepped and draped in usual fashion. Anesthesia was obtained with 2% lidocaine with epinephrine, and the subcutaneous cuff was blunt dissected free. The catheter was then removed in total, and a compression dressing was applied to the exit site. Mr. Hendrix tolerated the procedure well. There were no immediate complications. Estimated blood loss was less than 1 mL. No sedation was given.

## 2019-05-16 NOTE — TELEPHONE ENCOUNTER
· Called [facility], gave verbal orders to Leeanna to stop IV ABX  · Called Evi, with Dr Gonzalez, to get vásquez remove. They will call back with appointment date and time.  · Called pt to inform about the change.

## 2019-05-17 ENCOUNTER — LAB VISIT (OUTPATIENT)
Dept: LAB | Facility: HOSPITAL | Age: 59
End: 2019-05-17
Attending: INTERNAL MEDICINE
Payer: COMMERCIAL

## 2019-05-17 DIAGNOSIS — B25.9 CYTOMEGALOVIRUS INFECTION, UNSPECIFIED CYTOMEGALOVIRAL INFECTION TYPE: ICD-10-CM

## 2019-05-17 DIAGNOSIS — E83.42 HYPOMAGNESEMIA: ICD-10-CM

## 2019-05-17 DIAGNOSIS — E83.39 HYPOPHOSPHATEMIA: ICD-10-CM

## 2019-05-17 LAB
ALBUMIN SERPL BCP-MCNC: 3 G/DL (ref 3.5–5.2)
ALP SERPL-CCNC: 58 U/L (ref 55–135)
ALT SERPL W/O P-5'-P-CCNC: 15 U/L (ref 10–44)
ANION GAP SERPL CALC-SCNC: 8 MMOL/L (ref 8–16)
AST SERPL-CCNC: 17 U/L (ref 10–40)
BASOPHILS # BLD AUTO: 0.1 K/UL (ref 0–0.2)
BASOPHILS NFR BLD: 1.4 % (ref 0–1.9)
BILIRUB SERPL-MCNC: 0.4 MG/DL (ref 0.1–1)
BUN SERPL-MCNC: 19 MG/DL (ref 6–20)
CALCIUM SERPL-MCNC: 9.6 MG/DL (ref 8.7–10.5)
CHLORIDE SERPL-SCNC: 103 MMOL/L (ref 95–110)
CO2 SERPL-SCNC: 30 MMOL/L (ref 23–29)
CREAT SERPL-MCNC: 1.9 MG/DL (ref 0.5–1.4)
DIFFERENTIAL METHOD: ABNORMAL
EOSINOPHIL # BLD AUTO: 0.2 K/UL (ref 0–0.5)
EOSINOPHIL NFR BLD: 2.7 % (ref 0–8)
ERYTHROCYTE [DISTWIDTH] IN BLOOD BY AUTOMATED COUNT: 14.9 % (ref 11.5–14.5)
EST. GFR  (AFRICAN AMERICAN): 44.6 ML/MIN/1.73 M^2
EST. GFR  (NON AFRICAN AMERICAN): 38.6 ML/MIN/1.73 M^2
GLUCOSE SERPL-MCNC: 167 MG/DL (ref 70–110)
HCT VFR BLD AUTO: 33.3 % (ref 40–54)
HGB BLD-MCNC: 10.4 G/DL (ref 14–18)
IMM GRANULOCYTES # BLD AUTO: 0.06 K/UL (ref 0–0.04)
IMM GRANULOCYTES NFR BLD AUTO: 0.8 % (ref 0–0.5)
LYMPHOCYTES # BLD AUTO: 1.5 K/UL (ref 1–4.8)
LYMPHOCYTES NFR BLD: 20.4 % (ref 18–48)
MAGNESIUM SERPL-MCNC: 1 MG/DL (ref 1.6–2.6)
MCH RBC QN AUTO: 29.2 PG (ref 27–31)
MCHC RBC AUTO-ENTMCNC: 31.2 G/DL (ref 32–36)
MCV RBC AUTO: 94 FL (ref 82–98)
MONOCYTES # BLD AUTO: 0.8 K/UL (ref 0.3–1)
MONOCYTES NFR BLD: 11.6 % (ref 4–15)
NEUTROPHILS # BLD AUTO: 4.5 K/UL (ref 1.8–7.7)
NEUTROPHILS NFR BLD: 63.1 % (ref 38–73)
NRBC BLD-RTO: 0 /100 WBC
PHOSPHATE SERPL-MCNC: 4.4 MG/DL (ref 2.7–4.5)
PLATELET # BLD AUTO: 360 K/UL (ref 150–350)
PMV BLD AUTO: 8.8 FL (ref 9.2–12.9)
POTASSIUM SERPL-SCNC: 3.5 MMOL/L (ref 3.5–5.1)
PROT SERPL-MCNC: 7.5 G/DL (ref 6–8.4)
RBC # BLD AUTO: 3.56 M/UL (ref 4.6–6.2)
SODIUM SERPL-SCNC: 141 MMOL/L (ref 136–145)
WBC # BLD AUTO: 7.16 K/UL (ref 3.9–12.7)

## 2019-05-17 PROCEDURE — 36415 COLL VENOUS BLD VENIPUNCTURE: CPT | Mod: PO

## 2019-05-17 PROCEDURE — 83735 ASSAY OF MAGNESIUM: CPT

## 2019-05-17 PROCEDURE — 85025 COMPLETE CBC W/AUTO DIFF WBC: CPT

## 2019-05-17 PROCEDURE — 80053 COMPREHEN METABOLIC PANEL: CPT

## 2019-05-17 PROCEDURE — 84100 ASSAY OF PHOSPHORUS: CPT

## 2019-05-20 LAB — CMV DNA SERPL NAA+PROBE-ACNC: NORMAL IU/ML

## 2019-05-21 ENCOUNTER — TELEPHONE (OUTPATIENT)
Dept: INFECTIOUS DISEASES | Facility: CLINIC | Age: 59
End: 2019-05-21

## 2019-05-21 NOTE — TELEPHONE ENCOUNTER
----- Message from Yue Lowry MD sent at 5/21/2019 11:22 AM CDT -----  Please call patient to let him know his screening CMV was negative.

## 2019-05-30 ENCOUNTER — TELEPHONE (OUTPATIENT)
Dept: TRANSPLANT | Facility: CLINIC | Age: 59
End: 2019-05-30

## 2019-05-30 NOTE — TELEPHONE ENCOUNTER
Returned call to patient.  Patient asked to have his labs rescheduled to the [place] location instead of [place] as he has work in the morning.  Labs moved to the [place] location on 5/31/19 as requested by the patient.

## 2019-05-30 NOTE — TELEPHONE ENCOUNTER
----- Message from Kaleigh Melendez sent at 5/30/2019 10:51 AM CDT -----  Contact: Patient   Needs Advice    Reason for call: Requesting to speak with coordinator to discuss labs and blood work         Communication Preference: [phone]    Additional Information: n/a

## 2019-05-31 ENCOUNTER — LAB VISIT (OUTPATIENT)
Dept: LAB | Facility: HOSPITAL | Age: 59
End: 2019-05-31
Attending: INTERNAL MEDICINE
Payer: COMMERCIAL

## 2019-05-31 DIAGNOSIS — B25.9 CYTOMEGALOVIRUS INFECTION, UNSPECIFIED CYTOMEGALOVIRAL INFECTION TYPE: ICD-10-CM

## 2019-05-31 LAB
ALBUMIN SERPL BCP-MCNC: 3.2 G/DL (ref 3.5–5.2)
ALP SERPL-CCNC: 76 U/L (ref 55–135)
ALT SERPL W/O P-5'-P-CCNC: 19 U/L (ref 10–44)
ANION GAP SERPL CALC-SCNC: 8 MMOL/L (ref 8–16)
AST SERPL-CCNC: 17 U/L (ref 10–40)
BASOPHILS # BLD AUTO: 0.13 K/UL (ref 0–0.2)
BASOPHILS NFR BLD: 1.3 % (ref 0–1.9)
BILIRUB SERPL-MCNC: 0.3 MG/DL (ref 0.1–1)
BUN SERPL-MCNC: 24 MG/DL (ref 6–20)
CALCIUM SERPL-MCNC: 9.5 MG/DL (ref 8.7–10.5)
CHLORIDE SERPL-SCNC: 104 MMOL/L (ref 95–110)
CO2 SERPL-SCNC: 27 MMOL/L (ref 23–29)
CREAT SERPL-MCNC: 1.9 MG/DL (ref 0.5–1.4)
DIFFERENTIAL METHOD: ABNORMAL
EOSINOPHIL # BLD AUTO: 0.4 K/UL (ref 0–0.5)
EOSINOPHIL NFR BLD: 3.9 % (ref 0–8)
ERYTHROCYTE [DISTWIDTH] IN BLOOD BY AUTOMATED COUNT: 14.7 % (ref 11.5–14.5)
EST. GFR  (AFRICAN AMERICAN): 44.6 ML/MIN/1.73 M^2
EST. GFR  (NON AFRICAN AMERICAN): 38.6 ML/MIN/1.73 M^2
GLUCOSE SERPL-MCNC: 176 MG/DL (ref 70–110)
HCT VFR BLD AUTO: 35.7 % (ref 40–54)
HGB BLD-MCNC: 10.9 G/DL (ref 14–18)
IMM GRANULOCYTES # BLD AUTO: 0.25 K/UL (ref 0–0.04)
IMM GRANULOCYTES NFR BLD AUTO: 2.5 % (ref 0–0.5)
LYMPHOCYTES # BLD AUTO: 1.8 K/UL (ref 1–4.8)
LYMPHOCYTES NFR BLD: 17.7 % (ref 18–48)
MCH RBC QN AUTO: 28.7 PG (ref 27–31)
MCHC RBC AUTO-ENTMCNC: 30.5 G/DL (ref 32–36)
MCV RBC AUTO: 94 FL (ref 82–98)
MONOCYTES # BLD AUTO: 1.2 K/UL (ref 0.3–1)
MONOCYTES NFR BLD: 11.9 % (ref 4–15)
NEUTROPHILS # BLD AUTO: 6.2 K/UL (ref 1.8–7.7)
NEUTROPHILS NFR BLD: 62.7 % (ref 38–73)
NRBC BLD-RTO: 0 /100 WBC
PLATELET # BLD AUTO: 366 K/UL (ref 150–350)
PMV BLD AUTO: 8.8 FL (ref 9.2–12.9)
POTASSIUM SERPL-SCNC: 4.5 MMOL/L (ref 3.5–5.1)
PROT SERPL-MCNC: 7.4 G/DL (ref 6–8.4)
RBC # BLD AUTO: 3.8 M/UL (ref 4.6–6.2)
SODIUM SERPL-SCNC: 139 MMOL/L (ref 136–145)
WBC # BLD AUTO: 9.96 K/UL (ref 3.9–12.7)

## 2019-05-31 PROCEDURE — 36415 COLL VENOUS BLD VENIPUNCTURE: CPT | Mod: PO

## 2019-05-31 PROCEDURE — 80053 COMPREHEN METABOLIC PANEL: CPT

## 2019-05-31 PROCEDURE — 85025 COMPLETE CBC W/AUTO DIFF WBC: CPT

## 2019-06-03 LAB — CMV DNA SERPL NAA+PROBE-ACNC: 44 IU/ML

## 2019-06-04 ENCOUNTER — TELEPHONE (OUTPATIENT)
Dept: INFECTIOUS DISEASES | Facility: CLINIC | Age: 59
End: 2019-06-04

## 2019-06-04 NOTE — TELEPHONE ENCOUNTER
----- Message from Yue Lowry MD sent at 6/4/2019  9:27 AM CDT -----  Please let mr rain know we will continue to monitor his weekly CMV viral load - currently at 44, but would not retreat unless he has symptoms and the viral load is in the thousands.

## 2019-06-10 ENCOUNTER — HOSPITAL ENCOUNTER (OUTPATIENT)
Dept: RADIOLOGY | Facility: HOSPITAL | Age: 59
Discharge: HOME | End: 2019-06-10
Attending: INTERNAL MEDICINE
Payer: COMMERCIAL

## 2019-06-10 ENCOUNTER — OFFICE VISIT (OUTPATIENT)
Dept: TRANSPLANT | Facility: CLINIC | Age: 59
End: 2019-06-10
Payer: COMMERCIAL

## 2019-06-10 ENCOUNTER — PATIENT MESSAGE (OUTPATIENT)
Dept: TRANSPLANT | Facility: CLINIC | Age: 59
End: 2019-06-10

## 2019-06-10 ENCOUNTER — HOSPITAL ENCOUNTER (OUTPATIENT)
Dept: PULMONOLOGY | Facility: HOSPITAL | Age: 59
Discharge: HOME | End: 2019-06-10
Attending: INTERNAL MEDICINE
Payer: COMMERCIAL

## 2019-06-10 VITALS
SYSTOLIC BLOOD PRESSURE: 160 MMHG | BODY MASS INDEX: 31.92 KG/M2 | DIASTOLIC BLOOD PRESSURE: 85 MMHG | OXYGEN SATURATION: 99 % | HEIGHT: 70 IN | WEIGHT: 223 LBS | TEMPERATURE: 98 F | RESPIRATION RATE: 20 BRPM | HEART RATE: 83 BPM

## 2019-06-10 DIAGNOSIS — D84.9 IMMUNOSUPPRESSION: ICD-10-CM

## 2019-06-10 DIAGNOSIS — I82.621 DEEP VEIN THROMBOSIS (DVT) OF RIGHT UPPER EXTREMITY, UNSPECIFIED CHRONICITY, UNSPECIFIED VEIN: ICD-10-CM

## 2019-06-10 DIAGNOSIS — Z79.2 PROPHYLACTIC ANTIBIOTIC: ICD-10-CM

## 2019-06-10 DIAGNOSIS — Z48.298 ENCOUNTER FOLLOWING ORGAN TRANSPLANT: Primary | ICD-10-CM

## 2019-06-10 DIAGNOSIS — Z94.2 LUNG TRANSPLANTED: ICD-10-CM

## 2019-06-10 DIAGNOSIS — Z94.2 LUNG REPLACED BY TRANSPLANT: ICD-10-CM

## 2019-06-10 DIAGNOSIS — B25.9 CYTOMEGALOVIRUS (CMV) VIREMIA: ICD-10-CM

## 2019-06-10 DIAGNOSIS — N17.9 ACUTE KIDNEY INJURY: ICD-10-CM

## 2019-06-10 LAB
PRE FEV1 FVC: 78
PRE FEV1: 2.47
PRE FVC: 3.18
PREDICTED FEV1 FVC: 80
PREDICTED FEV1: 3.68
PREDICTED FVC: 4.54

## 2019-06-10 PROCEDURE — 71046 X-RAY EXAM CHEST 2 VIEWS: CPT | Mod: TC

## 2019-06-10 PROCEDURE — 99214 OFFICE O/P EST MOD 30 MIN: CPT | Mod: 25,S$GLB,, | Performed by: INTERNAL MEDICINE

## 2019-06-10 PROCEDURE — 94010 BREATHING CAPACITY TEST: CPT | Mod: 26,,, | Performed by: INTERNAL MEDICINE

## 2019-06-10 PROCEDURE — 99999 PR PBB SHADOW E&M-EST. PATIENT-LVL III: CPT | Mod: PBBFAC,,, | Performed by: INTERNAL MEDICINE

## 2019-06-10 PROCEDURE — 71046 XR CHEST PA AND LATERAL: ICD-10-PCS | Mod: 26,,, | Performed by: RADIOLOGY

## 2019-06-10 PROCEDURE — 99999 PR PBB SHADOW E&M-EST. PATIENT-LVL III: ICD-10-PCS | Mod: PBBFAC,,, | Performed by: INTERNAL MEDICINE

## 2019-06-10 PROCEDURE — 99214 PR OFFICE/OUTPT VISIT, EST, LEVL IV, 30-39 MIN: ICD-10-PCS | Mod: 25,S$GLB,, | Performed by: INTERNAL MEDICINE

## 2019-06-10 PROCEDURE — 94010 BREATHING CAPACITY TEST: ICD-10-PCS | Mod: 26,,, | Performed by: INTERNAL MEDICINE

## 2019-06-10 PROCEDURE — 71046 X-RAY EXAM CHEST 2 VIEWS: CPT | Mod: 26,,, | Performed by: RADIOLOGY

## 2019-06-10 NOTE — PROGRESS NOTES
LUNG TRANSPLANT RECIPIENT FOLLOW-UP    Reason for Visit: Follow-up after lung transplantation.                               Date of Transplant: 8/22/17     Reason for Transplant: Sarcoidosis with pulmonary hypertension     Type of Transplant: bilateral sequential lung     CMV Status: D+ / R-      Major Complications:   1. Left vocal cord dysfunction   2. 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 is a 58 y.o. year old male with the above listed transplant history who presents today for routine follow-up.  Since his last visit, he has been doing well.  Completed Foscarnet treatment approximately 3-4 weeks ago and continues to follow with ID.  Last CMV PCR 44 and he is now getting weekly levels drawn.  Remains on Apixaban for RUE DVT which he had related to a PICC line.  All lines have been removed and UE swelling has resolved.  He has no respiratory complaints today.  Had a viral GI illness last week which has resolved.  Denies any fever, chills, or sick contacts.  Takes all medications as directed without side effects.      Review of Systems   Constitutional: Negative for chills, diaphoresis, fever, malaise/fatigue and weight loss.   HENT: Negative for congestion, ear discharge, ear pain, hearing loss, nosebleeds, sinus pain, sore throat and tinnitus.    Eyes: Negative for blurred vision, double vision, photophobia, pain, discharge and redness.   Respiratory: Negative for cough, hemoptysis, sputum production, shortness of breath, wheezing and stridor.    Cardiovascular: Negative for chest pain, palpitations, orthopnea, claudication, leg swelling and PND.   Gastrointestinal: Negative for abdominal pain, blood in stool, constipation, diarrhea,  "heartburn, melena, nausea and vomiting.   Genitourinary: Negative for dysuria, flank pain, frequency, hematuria and urgency.   Musculoskeletal: Negative for back pain, falls, joint pain, myalgias and neck pain.   Skin: Negative for itching and rash.   Neurological: Negative for dizziness, tingling, tremors, sensory change, speech change, focal weakness, seizures, loss of consciousness, weakness and headaches.   Endo/Heme/Allergies: Negative for environmental allergies and polydipsia. Does not bruise/bleed easily.   Psychiatric/Behavioral: Negative for depression, hallucinations, memory loss, substance abuse and suicidal ideas. The patient is not nervous/anxious and does not have insomnia.      BP (!) 160/85   Pulse 83   Temp 97.5 °F (36.4 °C) (Oral)   Resp 20   Ht 5' 10" (1.778 m)   Wt 101.2 kg (223 lb)   SpO2 99%   BMI 32.00 kg/m²     Physical Exam   Constitutional: He is oriented to person, place, and time and well-developed, well-nourished, and in no distress. No distress.   HENT:   Head: Normocephalic and atraumatic.   Nose: Nose normal.   Eyes: Conjunctivae and EOM are normal. No scleral icterus.   Neck: Normal range of motion. Neck supple. No JVD present. No tracheal deviation present.   Cardiovascular: Normal rate, regular rhythm, normal heart sounds and intact distal pulses. Exam reveals no gallop and no friction rub.   No murmur heard.  Pulmonary/Chest: Effort normal. No stridor. No respiratory distress. He has no wheezes. He has no rales. He exhibits no tenderness.   Abdominal: Soft. Bowel sounds are normal. He exhibits no distension. There is no tenderness.   Musculoskeletal: Normal range of motion. He exhibits no edema, tenderness or deformity.   Neurological: He is alert and oriented to person, place, and time. Gait normal.   Skin: Skin is warm and dry. No rash noted. He is not diaphoretic. No erythema. No pallor.   Psychiatric: Mood, memory, affect and judgment normal.   Nursing note and vitals " reviewed.    Labs:  cbc, cmp, tacrolimus Latest Ref Rng & Units 5/17/2019 5/31/2019 6/10/2019   TACROLIMUS LVL 5.0 - 15.0 ng/mL - - -   WHITE BLOOD CELL COUNT 3.90 - 12.70 K/uL 7.16 9.96 7.11   RBC 4.60 - 6.20 M/uL 3.56(L) 3.80(L) 3.77(L)   HEMOGLOBIN 14.0 - 18.0 g/dL 10.4(L) 10.9(L) 10.9(L)   HEMATOCRIT 40.0 - 54.0 % 33.3(L) 35.7(L) 32.8(L)   MCV 82 - 98 fL 94 94 87   MCH 27.0 - 31.0 pg 29.2 28.7 28.9   MCHC 32.0 - 36.0 g/dL 31.2(L) 30.5(L) 33.2   RDW 11.5 - 14.5 % 14.9(H) 14.7(H) 14.3   PLATELETS 150 - 350 K/uL 360(H) 366(H) 306   MPV 9.2 - 12.9 fL 8.8(L) 8.8(L) 8.5(L)   GRAN # 1.8 - 7.7 K/uL 4.5 6.2 3.9   LYMPH # 1.0 - 4.8 K/uL 1.5 1.8 1.6   MONO # 0.3 - 1.0 K/uL 0.8 1.2(H) 1.1(H)   EOSINOPHIL% 0.0 - 8.0 % 2.7 3.9 4.6   BASOPHIL% 0.0 - 1.9 % 1.4 1.3 1.1   DIFFERENTIAL METHOD - Automated Automated Automated   SODIUM 136 - 145 mmol/L 141 139 138   POTASSIUM 3.5 - 5.1 mmol/L 3.5 4.5 4.3   CHLORIDE 95 - 110 mmol/L 103 104 105   CO2 23 - 29 mmol/L 30(H) 27 27   GLUCOSE 70 - 110 mg/dL 167(H) 176(H) 167(H)   BUN BLD 6 - 20 mg/dL 19 24(H) 21(H)   CREATININE 0.5 - 1.4 mg/dL 1.9(H) 1.9(H) 1.8(H)   CALCIUM 8.7 - 10.5 mg/dL 9.6 9.5 9.7   PROTEIN TOTAL 6.0 - 8.4 g/dL 7.5 7.4 7.5   ALBUMIN 3.5 - 5.2 g/dL 3.0(L) 3.2(L) 3.2(L)   BILIRUBIN TOTAL 0.1 - 1.0 mg/dL 0.4 0.3 0.4   ALK PHOS 55 - 135 U/L 58 76 77   AST 10 - 40 U/L 17 17 17   ALT 10 - 44 U/L 15 19 18   ANION GAP 8 - 16 mmol/L 8 8 6(L)   EGFR IF AFRICAN AMERICAN >60 mL/min/1.73 m:2 44.6(A) 44.6(A) 47.6(A)   EGFR IF NON-AFRICAN AMERICAN >60 mL/min/1.73 m:2 38.6(A) 38.6(A) 41.2(A)     Pulmonary Function Tests 6/10/2019 5/6/2019 3/22/2019 2/14/2019 12/4/2018 11/19/2018 10/17/2018   FVC 3.18 3.23 3.21 3.09 3.1 3.02 3.06   FEV1 2.47 2.51 2.56 2.44 2.5 2.45 2.52   TLC (liters) - - - - - - -   DLCO (ml/mmHg sec) - - - - - - -   FVC% 65 66 70 68 68 61.3 62.1   FEV1% 66 67.1 70 66 68 64.9 66.8   FEF 25-75 - 2.29 2.58 2.39 2.32 2.47 2.55   FEF 25-75% - 72.3 69 64 62 76.5 79   TLC% -  - - - - - -   RV - - - - - - -   RV% - - - - - - -   DLCO% - - - - - - -       Imaging:  Results for orders placed during the hospital encounter of 06/10/19   X-Ray Chest PA And Lateral    Narrative EXAMINATION:  XR CHEST PA AND LATERAL    CLINICAL HISTORY:  Lung transplant status    TECHNIQUE:  PA and lateral views of the chest were performed.    COMPARISON:  Chest radiograph 05/06/2019.    FINDINGS:  Sternal wires are aligned and intact. Interval removal of right central catheter.    The lungs are clear, with normal appearance of pulmonary vasculature or pneumothorax.  Unchanged left lung base atelectasis and small left pleural effusion.    The cardiac silhouette is stable in size. The hilar and mediastinal contours are unremarkable.    Bones are intact. Degenerative changes of the visualized spine.      Impression Unchanged left lung base atelectasis and small left pleural effusion when compared to chest radiograph 05/06/2019.    Electronically signed by resident: Fabricio Baird  Date:    06/10/2019  Time:    08:01    Electronically signed by: Ulysses Miles MD  Date:    06/10/2019  Time:    08:07       Assessment:  1. Encounter following organ transplant    2. Lung transplanted    3. Immunosuppression    4. Prophylactic antibiotic    5. Cytomegalovirus (CMV) viremia    6. Acute kidney injury    7. Deep vein thrombosis (DVT) of right upper extremity, unspecified chronicity, unspecified vein      Plan:   1. FEV1 relatively stable but remains slightly below his 90th percentile post transplant level.  CXR without acute changes.  Symptomatically doing well.  No concerns for graft dysfunction.  Repeat PFTs in 2 weeks as he has been on a very slight continued decline.    2. Continue with Tacrolimus and Prednisone.  Holding MMF due to recent CMV viremia.  Will follow-up Tac level and adjust if needed.    3. Continue with Bactrim SS.    4. Has completed Foscarnet therapy and is being followed by ID.  Most recent CMV PCR  detected at 44.  He is getting weekly scheduled CMV PCR.    5. Can discontinue Apixaban for his provoked RUE DVT as all indwelling lines have been removed and he has completed 3 months of therapy.    6. Creatinine stable.  Continue to monitor.    7. Follow-up in 3 months or earlier if needed.      Samantha Bill D.O.  Lung Transplant  Pulmonary/Critical Care Medicine

## 2019-06-20 RX ORDER — POTASSIUM CHLORIDE 750 MG/1
20 CAPSULE, EXTENDED RELEASE ORAL DAILY
Qty: 60 CAPSULE | Refills: 1 | Status: SHIPPED | OUTPATIENT
Start: 2019-06-20 | End: 2020-06-22

## 2019-06-21 ENCOUNTER — LAB VISIT (OUTPATIENT)
Dept: LAB | Facility: HOSPITAL | Age: 59
End: 2019-06-21
Attending: INTERNAL MEDICINE
Payer: COMMERCIAL

## 2019-06-21 DIAGNOSIS — Z94.2 LUNG REPLACED BY TRANSPLANT: Primary | ICD-10-CM

## 2019-06-21 DIAGNOSIS — B25.9 CYTOMEGALOVIRUS INFECTION, UNSPECIFIED CYTOMEGALOVIRAL INFECTION TYPE: ICD-10-CM

## 2019-06-21 PROCEDURE — 36415 COLL VENOUS BLD VENIPUNCTURE: CPT | Mod: PO

## 2019-06-21 RX ORDER — PREDNISONE 5 MG/1
5 TABLET ORAL DAILY
Qty: 30 TABLET | Refills: 11 | Status: SHIPPED | OUTPATIENT
Start: 2019-06-21 | End: 2020-06-29

## 2019-06-24 LAB — CMV DNA SERPL NAA+PROBE-ACNC: <35 IU/ML

## 2019-06-28 ENCOUNTER — LAB VISIT (OUTPATIENT)
Dept: LAB | Facility: HOSPITAL | Age: 59
End: 2019-06-28
Attending: INTERNAL MEDICINE
Payer: COMMERCIAL

## 2019-06-28 DIAGNOSIS — Z94.2 LUNG REPLACED BY TRANSPLANT: ICD-10-CM

## 2019-06-28 PROCEDURE — 36415 COLL VENOUS BLD VENIPUNCTURE: CPT | Mod: PO

## 2019-07-01 LAB — CMV DNA SERPL NAA+PROBE-ACNC: <35 IU/ML

## 2019-07-05 ENCOUNTER — LAB VISIT (OUTPATIENT)
Dept: LAB | Facility: HOSPITAL | Age: 59
End: 2019-07-05
Attending: NURSE PRACTITIONER
Payer: COMMERCIAL

## 2019-07-05 DIAGNOSIS — Z94.2 LUNG REPLACED BY TRANSPLANT: ICD-10-CM

## 2019-07-05 PROCEDURE — 36415 COLL VENOUS BLD VENIPUNCTURE: CPT | Mod: PO

## 2019-07-08 LAB — CMV DNA SERPL NAA+PROBE-ACNC: <35 IU/ML

## 2019-07-12 ENCOUNTER — DOCUMENTATION ONLY (OUTPATIENT)
Dept: TRANSPLANT | Facility: CLINIC | Age: 59
End: 2019-07-12

## 2019-07-12 ENCOUNTER — LAB VISIT (OUTPATIENT)
Dept: LAB | Facility: HOSPITAL | Age: 59
End: 2019-07-12
Attending: INTERNAL MEDICINE
Payer: COMMERCIAL

## 2019-07-12 DIAGNOSIS — Z94.2 LUNG REPLACED BY TRANSPLANT: ICD-10-CM

## 2019-07-12 PROCEDURE — 36415 COLL VENOUS BLD VENIPUNCTURE: CPT | Mod: PO

## 2019-07-16 LAB — CMV DNA SERPL NAA+PROBE-ACNC: <35 IU/ML

## 2019-07-18 ENCOUNTER — TELEPHONE (OUTPATIENT)
Dept: INFECTIOUS DISEASES | Facility: CLINIC | Age: 59
End: 2019-07-18

## 2019-07-18 NOTE — TELEPHONE ENCOUNTER
----- Message from Yue Lowry MD sent at 7/17/2019  4:46 PM CDT -----  Please let him know CMV remains negative and that we will just stop monitoring him - cancel any appointment from our end.

## 2019-09-06 NOTE — PROGRESS NOTES
CC:  Diabetes.     HPI: Martin Hendrix Jr. is a 58 y.o. male presents for visit.   The patient was diagnosed with Type 2 diabetes in ~2017 on labs.    Living back home in [place].    DIABETES COMPLICATIONS: nephropathy (micro x1)     Diabetes Management Status  Statin: Taking  ACE/ARB: Not taking  Screening or Prevention Patient's value Goal Complete/Controlled?   HgA1C Testing and Control   Lab Results   Component Value Date    HGBA1C 10.9 (H) 02/12/2019        GOAL A1C: <7% Yes   Lipid profile : 02/19/2018 Annually Yes   LDL control Lab Results   Component Value Date    LDLCALC 129.4 02/19/2018    Annually/Less than 100 mg/dl  No   Nephropathy screening Lab Results   Component Value Date    LABMICR 197.0 10/04/2018     Lab Results   Component Value Date    PROTEINUA Negative 08/21/2017    Annually Yes   Blood pressure BP Readings from Last 1 Encounters:   09/10/19 (!) 154/98    Less than 140/90 No   Dilated retinal exam : 12/13/2017 Annually Yes   Foot exam   : 11/15/2017 Annually Yes     DM MEDICATIONS USED IN THE PAST: Metformin, Glipizide, MDI while in hospital     CURRENT DIABETES MEDICATIONS: Humalog 7 units AC plus correction scale (150 +1), Tresiba 13 units nightly    BLOOD GLUCOSE MONITORING:    Checking BG 0-1x daily.  Unsure of BG     HYPOGLYCEMIA:  No  Knows how to correct with 15 grams of carbs- juice, coke, or a peppermint.     MEALS:   Eating 2-3 meals per day   Snacks on fruit, chips  Water + lemon, rare juice    Review of Systems   Constitutional: Negative for fatigue.   Eyes: Negative for visual disturbance.   Respiratory: Negative for shortness of breath.    Cardiovascular: Negative for chest pain.   Gastrointestinal: Negative for abdominal pain.   Musculoskeletal: Negative for arthralgias.   Skin: Negative for wound.   Neurological: Negative for headaches.   Hematological: Does not bruise/bleed easily.   Psychiatric/Behavioral: Negative for sleep disturbance.     + pancreatitis,  "hospitalized (~2009), denies gastroparesis.     BP (!) 154/98   Pulse 70   Ht 5' 11" (1.803 m)   Wt 103.7 kg (228 lb 9.9 oz)   BMI 31.89 kg/m²    Physical Exam   Constitutional: He appears well-developed.   Neck: No thyromegaly present.   Cardiovascular: Normal rate.   Pulmonary/Chest: Effort normal.   Abdominal: Soft.   Vitals reviewed.  Appropriate footwear, Foot exam deferred per patient.  No ulcers or wounds.   injection sites are ok. No lipo hypertropthy or atrophy    Lab Results   Component Value Date    TSH 1.092 04/24/2017     ASSESSMENT and PLAN:  1. Type 2 diabetes mellitus with hyperglycemia, with long-term current use of insulin  insulin degludec (TRESIBA FLEXTOUCH U-200) 200 unit/mL (3 mL) InPn    insulin lispro (HUMALOG KWIKPEN INSULIN) 100 unit/mL pen    Comprehensive metabolic panel    Hemoglobin A1c    Fructosamine    Microalbumin/creatinine urine ratio   2. Lung transplanted     3. Mixed hyperlipidemia  Lipid panel   4. BMI 31.0-31.9,adult       Type 2 diabetes with micro x1 and hyperglycemia -   -- labs today, insufficient data at this time to be able to change doses.  Continue Tresiba 13 units nightly, increase Humalog with breakfast to 7 units TID AC plus correction scale 150 +1.   -- Encouraged compliance with checking BG   -- Reviewed goals of therapy are to get the best control we can without hypoglycemia  -- Reviewed patient's current insulin regimen. Clarified proper insulin dose and timing in relation to meals, etc. Insulin injection sites and proper rotation instructed.    -- Advised frequent self blood glucose monitoring.  Patient encouraged to document glucose results and bring them to every clinic visit    -- Hypoglycemia precautions discussed. Instructed on precautions before driving.    -- Call for Bg repeatedly < 90 or > 180.   -- Close adherence to lifestyle changes recommended.   -- Periodic follow ups for eye evaluations, foot care and dental care suggested.    -- Cannot use " DPP-4 or GLP-1 medications due to pancreatitis, caution with SGLT-2 at this time, due to h/o AVILA and high BG    -- Instructed to monitor BG ac/hs, document on BG logs, and bring complete BG logs to all visits.     Microalbuminuria x1  -- optimize BG control    lung transplant on immunosuppression and steroids  Prednisone 5 mg daily- to stay on this dose.   Has an appointment with Dr. Wolfe on 9/16    Hyperlipidemia -    Continue Pravastatin 20 mg daily. Use of pravastatin due to potential interaction with valcyte wih lipitor.  Can not check LP today due to patient ate breakfast before appt.    BMI 31  -- encouraged dietary and lifestyle modifications   -- emphasized weight loss goals     No follow-ups on file.   Labs today

## 2019-09-10 ENCOUNTER — OFFICE VISIT (OUTPATIENT)
Dept: ENDOCRINOLOGY | Facility: CLINIC | Age: 59
End: 2019-09-10
Payer: COMMERCIAL

## 2019-09-10 VITALS
HEART RATE: 70 BPM | HEIGHT: 71 IN | BODY MASS INDEX: 32.01 KG/M2 | SYSTOLIC BLOOD PRESSURE: 154 MMHG | WEIGHT: 228.63 LBS | DIASTOLIC BLOOD PRESSURE: 98 MMHG

## 2019-09-10 DIAGNOSIS — Z94.2 LUNG TRANSPLANTED: ICD-10-CM

## 2019-09-10 DIAGNOSIS — Z79.4 TYPE 2 DIABETES MELLITUS WITH HYPERGLYCEMIA, WITH LONG-TERM CURRENT USE OF INSULIN: Primary | Chronic | ICD-10-CM

## 2019-09-10 DIAGNOSIS — E11.65 TYPE 2 DIABETES MELLITUS WITH HYPERGLYCEMIA, WITH LONG-TERM CURRENT USE OF INSULIN: Primary | Chronic | ICD-10-CM

## 2019-09-10 DIAGNOSIS — E78.2 MIXED HYPERLIPIDEMIA: ICD-10-CM

## 2019-09-10 PROCEDURE — 99999 PR PBB SHADOW E&M-EST. PATIENT-LVL III: CPT | Mod: PBBFAC,,, | Performed by: NURSE PRACTITIONER

## 2019-09-10 PROCEDURE — 99213 OFFICE O/P EST LOW 20 MIN: CPT | Mod: PBBFAC | Performed by: NURSE PRACTITIONER

## 2019-09-10 PROCEDURE — 99214 OFFICE O/P EST MOD 30 MIN: CPT | Mod: S$GLB,,, | Performed by: NURSE PRACTITIONER

## 2019-09-10 PROCEDURE — 99214 PR OFFICE/OUTPT VISIT, EST, LEVL IV, 30-39 MIN: ICD-10-PCS | Mod: S$GLB,,, | Performed by: NURSE PRACTITIONER

## 2019-09-10 PROCEDURE — 99999 PR PBB SHADOW E&M-EST. PATIENT-LVL III: ICD-10-PCS | Mod: PBBFAC,,, | Performed by: NURSE PRACTITIONER

## 2019-09-10 RX ORDER — INSULIN DEGLUDEC 200 U/ML
13 INJECTION, SOLUTION SUBCUTANEOUS NIGHTLY
Qty: 15 ML | Refills: 11 | Status: SHIPPED | OUTPATIENT
Start: 2019-09-10 | End: 2020-06-01

## 2019-09-10 RX ORDER — INSULIN LISPRO 100 [IU]/ML
7 INJECTION, SOLUTION INTRAVENOUS; SUBCUTANEOUS
Qty: 15 ML | Refills: 11 | Status: SHIPPED | OUTPATIENT
Start: 2019-09-10 | End: 2020-06-01

## 2019-09-16 ENCOUNTER — PATIENT MESSAGE (OUTPATIENT)
Dept: TRANSPLANT | Facility: CLINIC | Age: 59
End: 2019-09-16

## 2019-09-16 ENCOUNTER — OFFICE VISIT (OUTPATIENT)
Dept: TRANSPLANT | Facility: CLINIC | Age: 59
End: 2019-09-16
Payer: COMMERCIAL

## 2019-09-16 ENCOUNTER — HOSPITAL ENCOUNTER (OUTPATIENT)
Dept: RADIOLOGY | Facility: HOSPITAL | Age: 59
Discharge: HOME | End: 2019-09-16
Attending: INTERNAL MEDICINE
Payer: MEDICARE

## 2019-09-16 ENCOUNTER — HOSPITAL ENCOUNTER (OUTPATIENT)
Dept: PULMONOLOGY | Facility: HOSPITAL | Age: 59
Discharge: HOME | End: 2019-09-16
Attending: INTERNAL MEDICINE
Payer: COMMERCIAL

## 2019-09-16 VITALS
DIASTOLIC BLOOD PRESSURE: 88 MMHG | BODY MASS INDEX: 32.07 KG/M2 | HEIGHT: 70 IN | TEMPERATURE: 97 F | RESPIRATION RATE: 20 BRPM | WEIGHT: 224 LBS | HEART RATE: 84 BPM | SYSTOLIC BLOOD PRESSURE: 145 MMHG | OXYGEN SATURATION: 98 %

## 2019-09-16 DIAGNOSIS — Z79.2 PROPHYLACTIC ANTIBIOTIC: ICD-10-CM

## 2019-09-16 DIAGNOSIS — I82.621 ACUTE DEEP VEIN THROMBOSIS (DVT) OF RIGHT UPPER EXTREMITY, UNSPECIFIED VEIN: ICD-10-CM

## 2019-09-16 DIAGNOSIS — Z48.298 ENCOUNTER FOLLOWING ORGAN TRANSPLANT: Primary | ICD-10-CM

## 2019-09-16 DIAGNOSIS — N17.9 AKI (ACUTE KIDNEY INJURY): ICD-10-CM

## 2019-09-16 DIAGNOSIS — Z94.2 LUNG TRANSPLANTED: ICD-10-CM

## 2019-09-16 DIAGNOSIS — B25.9 CYTOMEGALOVIRUS (CMV) VIREMIA: ICD-10-CM

## 2019-09-16 DIAGNOSIS — N17.9 ACUTE KIDNEY INJURY: Primary | ICD-10-CM

## 2019-09-16 DIAGNOSIS — D84.9 IMMUNOSUPPRESSION: ICD-10-CM

## 2019-09-16 LAB
FEF 25 75 LLN: 1.62
FEF 25 75 PRE REF: 74.1 %
FEF 25 75 REF: 3.18
FEV05 LLN: 1.75
FEV05 REF: 2.88
FEV1 FVC LLN: 66
FEV1 FVC PRE REF: 100.7 %
FEV1 FVC REF: 78
FEV1 LLN: 2.83
FEV1 PRE REF: 67.2 %
FEV1 REF: 3.71
FVC LLN: 3.66
FVC PRE REF: 66.6 %
FVC REF: 4.76
PEF LLN: 7.18
PEF PRE REF: 48.7 %
PEF REF: 9.5
PRE FEF 25 75: 2.36 L/S (ref 1.62–5.27)
PRE FET 100: 6.55 SEC
PRE FEV05 REF: 64.4 %
PRE FEV1 FVC: 78.57 % (ref 66.4–88.05)
PRE FEV1: 2.49 L (ref 2.83–4.54)
PRE FEV5: 1.86 L (ref 1.75–4.02)
PRE FVC: 3.17 L (ref 3.66–5.88)
PRE PEF: 4.62 L/S (ref 7.18–11.81)

## 2019-09-16 PROCEDURE — 71046 X-RAY EXAM CHEST 2 VIEWS: CPT | Mod: 26,,, | Performed by: RADIOLOGY

## 2019-09-16 PROCEDURE — 99214 OFFICE O/P EST MOD 30 MIN: CPT | Mod: 25,S$PBB,, | Performed by: INTERNAL MEDICINE

## 2019-09-16 PROCEDURE — 71046 X-RAY EXAM CHEST 2 VIEWS: CPT | Mod: TC

## 2019-09-16 PROCEDURE — 71046 XR CHEST PA AND LATERAL: ICD-10-PCS | Mod: 26,,, | Performed by: RADIOLOGY

## 2019-09-16 PROCEDURE — 99214 PR OFFICE/OUTPT VISIT, EST, LEVL IV, 30-39 MIN: ICD-10-PCS | Mod: 25,S$PBB,, | Performed by: INTERNAL MEDICINE

## 2019-09-16 PROCEDURE — 99213 OFFICE O/P EST LOW 20 MIN: CPT | Mod: PBBFAC,25 | Performed by: INTERNAL MEDICINE

## 2019-09-16 PROCEDURE — 99999 PR PBB SHADOW E&M-EST. PATIENT-LVL III: ICD-10-PCS | Mod: PBBFAC,,, | Performed by: INTERNAL MEDICINE

## 2019-09-16 PROCEDURE — 94010 BREATHING CAPACITY TEST: ICD-10-PCS | Mod: 26,,, | Performed by: INTERNAL MEDICINE

## 2019-09-16 PROCEDURE — 99999 PR PBB SHADOW E&M-EST. PATIENT-LVL III: CPT | Mod: PBBFAC,,, | Performed by: INTERNAL MEDICINE

## 2019-09-16 PROCEDURE — 94010 BREATHING CAPACITY TEST: CPT | Mod: 26,,, | Performed by: INTERNAL MEDICINE

## 2019-09-16 NOTE — PROGRESS NOTES
LUNG TRANSPLANT RECIPIENT FOLLOW-UP    Reason for Visit: Follow-up after lung transplantation.                               Date of Transplant: 8/22/17     Reason for Transplant: Sarcoidosis with pulmonary hypertension     Type of Transplant: bilateral sequential lung     CMV Status: D+ / R-      Major Complications:   1. Left vocal cord dysfunction   2. 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 is a 58 y.o. year old male with the above listed transplant history who presents today for routine follow-up.  Since his last visit, he has been doing well. He has been off of Foscarnet for 5 months now. His Cr still hasn't improved since starting that medication. He doesn't see nephrology. His last CMV was low. He was advised that he can stop eliquis for his provoked RUE DVT but he is still on it. We talked about stopping it today. His BG was elevated today. He states that his primary ordered an A1c that was drawn today. He does admit to some LE edema that improves with elevation. He has no respiratory complaints today. Denies any fever, chills, or sick contacts.  Takes all medications as directed without side effects.      Review of Systems   Constitutional: Negative for chills, diaphoresis, fever, malaise/fatigue and weight loss.   HENT: Negative for congestion, ear discharge, ear pain, hearing loss, nosebleeds, sinus pain, sore throat and tinnitus.    Eyes: Negative for blurred vision, double vision, photophobia, pain, discharge and redness.   Respiratory: Negative for cough, hemoptysis, sputum production, shortness of breath, wheezing and stridor.    Cardiovascular: Negative for chest pain, palpitations, orthopnea, claudication, leg swelling and PND.  "  Gastrointestinal: Negative for abdominal pain, blood in stool, constipation, diarrhea, heartburn, melena, nausea and vomiting.   Genitourinary: Negative for dysuria, flank pain, frequency, hematuria and urgency.   Musculoskeletal: Negative for back pain, falls, joint pain, myalgias and neck pain.   Skin: Negative for itching and rash.   Neurological: Negative for dizziness, tingling, tremors, sensory change, speech change, focal weakness, seizures, loss of consciousness, weakness and headaches.   Endo/Heme/Allergies: Negative for environmental allergies and polydipsia. Does not bruise/bleed easily.   Psychiatric/Behavioral: Negative for depression, hallucinations, memory loss, substance abuse and suicidal ideas. The patient is not nervous/anxious and does not have insomnia.      BP (!) 145/88   Pulse 84   Temp 97.2 °F (36.2 °C) (Oral)   Resp 20   Ht 5' 10" (1.778 m)   Wt 101.6 kg (224 lb)   SpO2 98%   BMI 32.14 kg/m²     Physical Exam   Constitutional: He is oriented to person, place, and time and well-developed, well-nourished, and in no distress. No distress.   HENT:   Head: Normocephalic and atraumatic.   Nose: Nose normal.   Eyes: Conjunctivae and EOM are normal. No scleral icterus.   Neck: Normal range of motion. Neck supple. No JVD present. No tracheal deviation present.   Cardiovascular: Normal rate, regular rhythm, normal heart sounds and intact distal pulses. Exam reveals no gallop and no friction rub.   No murmur heard.  Pulmonary/Chest: Effort normal. No stridor. No respiratory distress. He has no wheezes. He has no rales. He exhibits no tenderness.   Abdominal: Soft. Bowel sounds are normal. He exhibits no distension. There is no tenderness.   Musculoskeletal: Normal range of motion. He exhibits no edema, tenderness or deformity.   Neurological: He is alert and oriented to person, place, and time. Gait normal.   Skin: Skin is warm and dry. No rash noted. He is not diaphoretic. No erythema. No " pallor.   Psychiatric: Mood, memory, affect and judgment normal.   Nursing note and vitals reviewed.    Labs:  cbc, cmp, tacrolimus Latest Ref Rng & Units 6/10/2019 9/16/2019 9/16/2019   TACROLIMUS LVL 5.0 - 15.0 ng/mL 5.0 7.3 -   WHITE BLOOD CELL COUNT 3.90 - 12.70 K/uL 7.11 9.52 -   RBC 4.60 - 6.20 M/uL 3.77(L) 3.81(L) -   HEMOGLOBIN 14.0 - 18.0 g/dL 10.9(L) 10.9(L) -   HEMATOCRIT 40.0 - 54.0 % 32.8(L) 33.2(L) -   MCV 82 - 98 fL 87 87 -   MCH 27.0 - 31.0 pg 28.9 28.6 -   MCHC 32.0 - 36.0 g/dL 33.2 32.8 -   RDW 11.5 - 14.5 % 14.3 13.1 -   PLATELETS 150 - 350 K/uL 306 374(H) -   MPV 9.2 - 12.9 fL 8.5(L) 8.8(L) -   GRAN # 1.8 - 7.7 K/uL 3.9 5.1 -   LYMPH # 1.0 - 4.8 K/uL 1.6 2.1 -   MONO # 0.3 - 1.0 K/uL 1.1(H) 1.1(H) -   EOSINOPHIL% 0.0 - 8.0 % 4.6 9.8(H) -   BASOPHIL% 0.0 - 1.9 % 1.1 1.4 -   DIFFERENTIAL METHOD - Automated Automated -   SODIUM 136 - 145 mmol/L 138 136 136   POTASSIUM 3.5 - 5.1 mmol/L 4.3 4.3 4.3   CHLORIDE 95 - 110 mmol/L 105 104 104   CO2 23 - 29 mmol/L 27 24 24   GLUCOSE 70 - 110 mg/dL 167(H) 279(H) 279(H)   BUN BLD 6 - 20 mg/dL 21(H) 36(H) 36(H)   CREATININE 0.5 - 1.4 mg/dL 1.8(H) 2.0(H) 2.0(H)   CALCIUM 8.7 - 10.5 mg/dL 9.7 8.8 8.8   PROTEIN TOTAL 6.0 - 8.4 g/dL 7.5 7.6 7.6   ALBUMIN 3.5 - 5.2 g/dL 3.2(L) 3.4(L) 3.4(L)   BILIRUBIN TOTAL 0.1 - 1.0 mg/dL 0.4 0.5 0.5   ALK PHOS 55 - 135 U/L 77 82 82   AST 10 - 40 U/L 17 17 17   ALT 10 - 44 U/L 18 15 15   ANION GAP 8 - 16 mmol/L 6(L) 8 8   EGFR IF AFRICAN AMERICAN >60 mL/min/1.73 m:2 47.6(A) 41.9(A) 41.9(A)   EGFR IF NON-AFRICAN AMERICAN >60 mL/min/1.73 m:2 41.2(A) 36.2(A) 36.2(A)     Pulmonary Function Tests 9/16/2019 7/12/2019 6/10/2019 5/6/2019 3/22/2019 2/14/2019 12/4/2018   FVC 3.17 3.18 3.18 3.23 3.21 3.09 3.1   FEV1 2.49 2.59 2.47 2.51 2.56 2.44 2.5   TLC (liters) - - - - - - -   DLCO (ml/mmHg sec) - - - - - - -   FVC% 66.6 64 65 66 70 68 68   FEV1% 67.2 68 66 67.1 70 66 68   FEF 25-75 - - - 2.29 2.58 2.39 2.32   FEF 25-75% - - - 72.3 69 64  62   TLC% - - - - - - -   RV - - - - - - -   RV% - - - - - - -   DLCO% - - - - - - -       Imaging:  Results for orders placed during the hospital encounter of 06/10/19   X-Ray Chest PA And Lateral    Narrative EXAMINATION:  XR CHEST PA AND LATERAL    CLINICAL HISTORY:  Lung transplant status    TECHNIQUE:  PA and lateral views of the chest were performed.    COMPARISON:  Chest radiograph 05/06/2019.    FINDINGS:  Sternal wires are aligned and intact. Interval removal of right central catheter.    The lungs are clear, with normal appearance of pulmonary vasculature or pneumothorax.  Unchanged left lung base atelectasis and small left pleural effusion.    The cardiac silhouette is stable in size. The hilar and mediastinal contours are unremarkable.    Bones are intact. Degenerative changes of the visualized spine.      Impression Unchanged left lung base atelectasis and small left pleural effusion when compared to chest radiograph 05/06/2019.    Electronically signed by resident: Fabricio Baird  Date:    06/10/2019  Time:    08:01    Electronically signed by: Ulysses Miles MD  Date:    06/10/2019  Time:    08:07       Assessment:  1. Encounter following organ transplant    2. Lung transplanted    3. Immunosuppression    4. Prophylactic antibiotic    5. Cytomegalovirus (CMV) viremia    6. AVILA (acute kidney injury)    7. Acute deep vein thrombosis (DVT) of right upper extremity, unspecified vein         Plan:   1. FEV1 relatively stable over the past year. CXR without acute changes. No active symptoms. No concerns for graft dysfunction.      2. Continue with Tacrolimus and Prednisone. Will follow-up Tac level and adjust if needed.    3. Continue with Bactrim SS.    4. Has completed Foscarnet therapy.  Most recent CMV PCR undetectable. He states that ID stopped following him for this    5. Advised him to stop Apixaban for his provoked RUE DVT that was in march 2019. Has completed more than the appropriate 3 months of  therapy    6. Creatinine stable. Recommend that he see's a nephrologist to monitor his renal function. Pt's Primary care physician is monitoring and managing his diabetes. Currently his diabetes is uncontrolled with his A1c today increasing to 11.5. Will need tighter control by his PCP.    7. RTC in 3 months.      Hang Bowling MD  [facility] Pulmonary/critical care fellow  Lung Transplant service    Attending Note:    I have seen and evaluated the patient with the fellow. Their note reflects the content of our discussion and my plan of care. Explained to Mr. Hendrix the importance of tight BP and DM control, especially with his CKD. He will continue to follow with Endocrinology and will refer him to Nephrology.       Darrick Wolfe MD  Pulmonary/Critical Care Medicine

## 2019-09-23 ENCOUNTER — PATIENT MESSAGE (OUTPATIENT)
Dept: TRANSPLANT | Facility: CLINIC | Age: 59
End: 2019-09-23

## 2019-10-28 NOTE — PATIENT INSTRUCTIONS
ANGELA spoke with Sandeep Collins Pt daughter explained discharge will be tomorrow  Transportation is scheduled, boss picking him up  Also explained [facility] and Pt will need to follow through with that and his medical assistance application  Daughter in agreement with discharge, Pt has been more upbeat on phone and "sounds" better  Call with questions

## 2019-11-10 DIAGNOSIS — Z94.2 LUNG REPLACED BY TRANSPLANT: ICD-10-CM

## 2019-11-11 RX ORDER — FOLIC ACID 1 MG/1
TABLET ORAL
Qty: 90 TABLET | Refills: 4 | Status: SHIPPED | OUTPATIENT
Start: 2019-11-11

## 2019-11-11 RX ORDER — ALPHA-D-GALACTOSIDASE 400 UNIT
TABLET ORAL
Qty: 2 TABLET | Refills: 4 | Status: SHIPPED | OUTPATIENT
Start: 2019-11-11

## 2019-11-14 DIAGNOSIS — Z79.4 TYPE 2 DIABETES MELLITUS WITH HYPERGLYCEMIA, WITH LONG-TERM CURRENT USE OF INSULIN: Chronic | ICD-10-CM

## 2019-11-14 DIAGNOSIS — E11.65 TYPE 2 DIABETES MELLITUS WITH HYPERGLYCEMIA, WITH LONG-TERM CURRENT USE OF INSULIN: Chronic | ICD-10-CM

## 2019-11-14 DIAGNOSIS — I10 ESSENTIAL HYPERTENSION: ICD-10-CM

## 2019-11-15 DIAGNOSIS — Z94.2 LUNG REPLACED BY TRANSPLANT: ICD-10-CM

## 2019-11-15 RX ORDER — PRAVASTATIN SODIUM 20 MG/1
TABLET ORAL
Qty: 90 TABLET | Refills: 0 | Status: SHIPPED | OUTPATIENT
Start: 2019-11-15 | End: 2020-02-21

## 2019-11-15 RX ORDER — AMLODIPINE BESYLATE 5 MG/1
TABLET ORAL
Qty: 90 TABLET | Refills: 0 | Status: SHIPPED | OUTPATIENT
Start: 2019-11-15 | End: 2019-12-17 | Stop reason: SDUPTHER

## 2019-11-17 RX ORDER — FAMOTIDINE 20 MG/1
TABLET, FILM COATED ORAL
Qty: 180 TABLET | Refills: 4 | Status: ON HOLD | OUTPATIENT
Start: 2019-11-17 | End: 2021-05-11 | Stop reason: SDUPTHER

## 2019-12-06 ENCOUNTER — TELEPHONE (OUTPATIENT)
Dept: TRANSPLANT | Facility: HOSPITAL | Age: 59
End: 2019-12-06

## 2019-12-06 NOTE — TELEPHONE ENCOUNTER
"SW received call from pt stating that he recently lost COBRA and was automatically enrolled in Medicare A and B. Pt states is uncertain what next steps would be and how to become covered for both medical and medications.  Pt also states has began a new occupation and had enrolled during opened enrollment for their insurance on Jan 1.  SW reviewed pt's Benefits in chart and noted a "[employer]" policy. SW uncertain if policy is Medicare secondary or part D. DEV provided pt with insurance customer service number, effective date (8/1/19) and member ID number. Pt states will call to inquire if this policy is current coverage and what kind of coverage.  SW also interacted pt to contact Medicare Part D to inquire about Part D coverage as pt may not have Rx coverage (pt reports intends to start filling meds next week). Pt also to contact his current HR to inquire about the total cost of the new upcoming plan and if it will work with Medicare.     Pt reports will contact SW back early next week after contacting companies. DEV remains available.    "

## 2019-12-16 ENCOUNTER — OFFICE VISIT (OUTPATIENT)
Dept: TRANSPLANT | Facility: CLINIC | Age: 59
End: 2019-12-16
Payer: COMMERCIAL

## 2019-12-16 ENCOUNTER — HOSPITAL ENCOUNTER (OUTPATIENT)
Dept: PULMONOLOGY | Facility: HOSPITAL | Age: 59
Discharge: HOME | End: 2019-12-16
Attending: INTERNAL MEDICINE
Payer: COMMERCIAL

## 2019-12-16 ENCOUNTER — PATIENT MESSAGE (OUTPATIENT)
Dept: TRANSPLANT | Facility: CLINIC | Age: 59
End: 2019-12-16

## 2019-12-16 ENCOUNTER — HOSPITAL ENCOUNTER (OUTPATIENT)
Dept: RADIOLOGY | Facility: HOSPITAL | Age: 59
Discharge: HOME | End: 2019-12-16
Attending: INTERNAL MEDICINE
Payer: COMMERCIAL

## 2019-12-16 ENCOUNTER — CLINICAL SUPPORT (OUTPATIENT)
Dept: INFECTIOUS DISEASES | Facility: CLINIC | Age: 59
End: 2019-12-16
Payer: COMMERCIAL

## 2019-12-16 VITALS
BODY MASS INDEX: 32.07 KG/M2 | HEIGHT: 70 IN | TEMPERATURE: 97 F | HEART RATE: 86 BPM | RESPIRATION RATE: 20 BRPM | DIASTOLIC BLOOD PRESSURE: 108 MMHG | WEIGHT: 224 LBS | SYSTOLIC BLOOD PRESSURE: 173 MMHG | OXYGEN SATURATION: 99 %

## 2019-12-16 DIAGNOSIS — Z79.2 PROPHYLACTIC ANTIBIOTIC: ICD-10-CM

## 2019-12-16 DIAGNOSIS — Z48.298 ENCOUNTER FOLLOWING ORGAN TRANSPLANT: Primary | ICD-10-CM

## 2019-12-16 DIAGNOSIS — N18.30 CHRONIC KIDNEY DISEASE (CKD), STAGE III (MODERATE): ICD-10-CM

## 2019-12-16 DIAGNOSIS — D84.9 IMMUNOSUPPRESSION: ICD-10-CM

## 2019-12-16 DIAGNOSIS — Z94.2 LUNG TRANSPLANTED: ICD-10-CM

## 2019-12-16 DIAGNOSIS — T86.819 COMPLICATION OF TRANSPLANTED LUNG, UNSPECIFIED COMPLICATION: ICD-10-CM

## 2019-12-16 LAB
ADENOVIRUS: NOT DETECTED
BORDETELLA PARAPERTUSSIS (IS1001): NOT DETECTED
BORDETELLA PERTUSSIS (PTXP): NOT DETECTED
CHLAMYDIA PNEUMONIAE: NOT DETECTED
CORONAVIRUS 229E, COMMON COLD VIRUS: NOT DETECTED
CORONAVIRUS HKU1, COMMON COLD VIRUS: NOT DETECTED
CORONAVIRUS NL63, COMMON COLD VIRUS: NOT DETECTED
CORONAVIRUS OC43, COMMON COLD VIRUS: NOT DETECTED
FEF 25 75 LLN: 1.61
FEF 25 75 PRE REF: 73.4 %
FEF 25 75 REF: 3.17
FEV05 LLN: 1.73
FEV05 REF: 2.86
FEV1 FVC LLN: 66
FEV1 FVC PRE REF: 104 %
FEV1 FVC REF: 78
FEV1 LLN: 2.82
FEV1 PRE REF: 62.5 %
FEV1 REF: 3.7
FLUBV RNA NPH QL NAA+NON-PROBE: NOT DETECTED
FVC LLN: 3.65
FVC PRE REF: 59.9 %
FVC REF: 4.75
HPIV1 RNA NPH QL NAA+NON-PROBE: NOT DETECTED
HPIV2 RNA NPH QL NAA+NON-PROBE: NOT DETECTED
HPIV3 RNA NPH QL NAA+NON-PROBE: NOT DETECTED
HPIV4 RNA NPH QL NAA+NON-PROBE: NOT DETECTED
HUMAN METAPNEUMOVIRUS: NOT DETECTED
INFLUENZA A (SUBTYPES H1,H1-2009,H3): NOT DETECTED
MYCOPLASMA PNEUMONIAE: NOT DETECTED
PEF LLN: 7.12
PEF PRE REF: 49.5 %
PEF REF: 9.43
PRE FEF 25 75: 2.33 L/S (ref 1.61–5.26)
PRE FET 100: 7.19 SEC
PRE FEV05 REF: 61.5 %
PRE FEV1 FVC: 81.05 % (ref 66.33–88.04)
PRE FEV1: 2.31 L (ref 2.82–4.53)
PRE FEV5: 1.76 L (ref 1.73–4)
PRE FVC: 2.85 L (ref 3.65–5.87)
PRE PEF: 4.67 L/S (ref 7.12–11.75)
RESPIRATORY INFECTION PANEL SOURCE: NORMAL
RSV RNA NPH QL NAA+NON-PROBE: NOT DETECTED
RV+EV RNA NPH QL NAA+NON-PROBE: NOT DETECTED

## 2019-12-16 PROCEDURE — 99214 OFFICE O/P EST MOD 30 MIN: CPT | Mod: 25,S$PBB,, | Performed by: INTERNAL MEDICINE

## 2019-12-16 PROCEDURE — 99213 OFFICE O/P EST LOW 20 MIN: CPT | Mod: PBBFAC,25 | Performed by: INTERNAL MEDICINE

## 2019-12-16 PROCEDURE — 94010 BREATHING CAPACITY TEST: ICD-10-PCS | Mod: 26,,, | Performed by: INTERNAL MEDICINE

## 2019-12-16 PROCEDURE — 71046 X-RAY EXAM CHEST 2 VIEWS: CPT | Mod: 26,,, | Performed by: RADIOLOGY

## 2019-12-16 PROCEDURE — 71046 XR CHEST PA AND LATERAL: ICD-10-PCS | Mod: 26,,, | Performed by: RADIOLOGY

## 2019-12-16 PROCEDURE — 94010 BREATHING CAPACITY TEST: CPT | Mod: 26,,, | Performed by: INTERNAL MEDICINE

## 2019-12-16 PROCEDURE — 99214 PR OFFICE/OUTPT VISIT, EST, LEVL IV, 30-39 MIN: ICD-10-PCS | Mod: 25,S$PBB,, | Performed by: INTERNAL MEDICINE

## 2019-12-16 PROCEDURE — 99999 PR PBB SHADOW E&M-EST. PATIENT-LVL III: CPT | Mod: PBBFAC,,, | Performed by: INTERNAL MEDICINE

## 2019-12-16 PROCEDURE — 71046 X-RAY EXAM CHEST 2 VIEWS: CPT | Mod: TC

## 2019-12-16 PROCEDURE — 99999 PR PBB SHADOW E&M-EST. PATIENT-LVL III: ICD-10-PCS | Mod: PBBFAC,,, | Performed by: INTERNAL MEDICINE

## 2019-12-16 PROCEDURE — 90686 IIV4 VACC NO PRSV 0.5 ML IM: CPT | Mod: PBBFAC

## 2019-12-16 PROCEDURE — 87633 RESP VIRUS 12-25 TARGETS: CPT

## 2019-12-16 RX ORDER — FUROSEMIDE 40 MG/1
40 TABLET ORAL DAILY PRN
Qty: 30 TABLET | Refills: 11 | Status: ON HOLD | OUTPATIENT
Start: 2019-12-16 | End: 2021-01-22

## 2019-12-17 ENCOUNTER — LAB VISIT (OUTPATIENT)
Dept: LAB | Facility: HOSPITAL | Age: 59
End: 2019-12-17
Attending: INTERNAL MEDICINE
Payer: COMMERCIAL

## 2019-12-17 DIAGNOSIS — Z94.2 LUNG REPLACED BY TRANSPLANT: ICD-10-CM

## 2019-12-17 PROCEDURE — 80197 ASSAY OF TACROLIMUS: CPT

## 2019-12-17 PROCEDURE — 36415 COLL VENOUS BLD VENIPUNCTURE: CPT | Mod: PO

## 2019-12-17 NOTE — PROGRESS NOTES
LUNG TRANSPLANT RECIPIENT FOLLOW-UP    Reason for Visit: Follow-up after lung transplantation.                               Date of Transplant: 8/22/17     Reason for Transplant: Sarcoidosis with pulmonary hypertension     Type of Transplant: bilateral sequential lung     CMV Status: D+ / R-      Major Complications:   1. Left vocal cord dysfunction   2. 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 is a 59 y.o. year old male with the above listed transplant history who presents today for routine follow-up.  Since his last visit, he has been doing well but developed some congestion and worsening hoarseness over the weekend. Denies shortness of breath, cough or chest pain. Has been having increase in lower extremities and has not been taking a diuretic.     Review of Systems   Constitutional: Negative for chills, diaphoresis, fever, malaise/fatigue and weight loss.   HENT: Positive for congestion. Negative for ear discharge, ear pain, hearing loss, nosebleeds, sinus pain, sore throat and tinnitus.    Eyes: Negative for blurred vision, double vision, photophobia, pain, discharge and redness.   Respiratory: Negative for cough, hemoptysis, sputum production, shortness of breath, wheezing and stridor.    Cardiovascular: Positive for leg swelling. Negative for chest pain, palpitations, orthopnea, claudication and PND.   Gastrointestinal: Negative for abdominal pain, blood in stool, constipation, diarrhea, heartburn, melena, nausea and vomiting.   Genitourinary: Negative for dysuria, flank pain, frequency, hematuria and urgency.   Musculoskeletal: Negative for back pain, falls, joint pain, myalgias and neck pain.   Skin: Negative for itching and rash.  "  Neurological: Negative for dizziness, tingling, tremors, sensory change, speech change, focal weakness, seizures, loss of consciousness, weakness and headaches.   Endo/Heme/Allergies: Negative for environmental allergies and polydipsia. Does not bruise/bleed easily.   Psychiatric/Behavioral: Negative for depression, hallucinations, memory loss, substance abuse and suicidal ideas. The patient is not nervous/anxious and does not have insomnia.      BP (!) 173/108   Pulse 86   Temp 97 °F (36.1 °C)   Resp 20   Ht 5' 10" (1.778 m)   Wt 101.6 kg (224 lb)   SpO2 99%   BMI 32.14 kg/m²     Physical Exam   Constitutional: He is oriented to person, place, and time and well-developed, well-nourished, and in no distress. No distress.   HENT:   Head: Normocephalic and atraumatic.   Nose: Nose normal.   Mouth/Throat: Oropharynx is clear and moist. No oropharyngeal exudate.   Eyes: Pupils are equal, round, and reactive to light. Conjunctivae and EOM are normal. No scleral icterus.   Neck: Normal range of motion. Neck supple. No JVD present. No tracheal deviation present. No thyromegaly present.   Cardiovascular: Normal rate, regular rhythm, normal heart sounds and intact distal pulses. Exam reveals no gallop and no friction rub.   No murmur heard.  Pulmonary/Chest: Effort normal. No stridor. No respiratory distress. He has no wheezes. He has no rales. He exhibits no tenderness.   Abdominal: Soft. Bowel sounds are normal. He exhibits no distension and no mass. There is no tenderness. There is no rebound and no guarding.   Musculoskeletal: Normal range of motion. He exhibits edema. He exhibits no tenderness or deformity.   Lymphadenopathy:     He has no cervical adenopathy.   Neurological: He is alert and oriented to person, place, and time. No cranial nerve deficit. Gait normal. Coordination normal.   Skin: Skin is warm and dry. No rash noted. He is not diaphoretic. No erythema. No pallor.   Psychiatric: Mood, memory, " affect and judgment normal.   Nursing note and vitals reviewed.    Labs:  cbc, cmp, tacrolimus Latest Ref Rng & Units 9/16/2019 9/16/2019 12/16/2019   TACROLIMUS LVL 5.0 - 15.0 ng/mL 7.3 - 4.6(L)   WHITE BLOOD CELL COUNT 3.90 - 12.70 K/uL 9.52 - 8.65   RBC 4.60 - 6.20 M/uL 3.81(L) - 4.17(L)   HEMOGLOBIN 14.0 - 18.0 g/dL 10.9(L) - 12.1(L)   HEMATOCRIT 40.0 - 54.0 % 33.2(L) - 36.9(L)   MCV 82 - 98 fL 87 - 89   MCH 27.0 - 31.0 pg 28.6 - 29.0   MCHC 32.0 - 36.0 g/dL 32.8 - 32.8   RDW 11.5 - 14.5 % 13.1 - 13.1   PLATELETS 150 - 350 K/uL 374(H) - 341   MPV 9.2 - 12.9 fL 8.8(L) - 8.6(L)   GRAN # 1.8 - 7.7 K/uL 5.1 - 4.4   LYMPH # 1.0 - 4.8 K/uL 2.1 - 2.0   MONO # 0.3 - 1.0 K/uL 1.1(H) - 1.1(H)   EOSINOPHIL% 0.0 - 8.0 % 9.8(H) - 11.7(H)   BASOPHIL% 0.0 - 1.9 % 1.4 - 1.4   DIFFERENTIAL METHOD - Automated - Automated   SODIUM 136 - 145 mmol/L 136 136 136   POTASSIUM 3.5 - 5.1 mmol/L 4.3 4.3 3.7   CHLORIDE 95 - 110 mmol/L 104 104 100   CO2 23 - 29 mmol/L 24 24 29   GLUCOSE 70 - 110 mg/dL 279(H) 279(H) 288(H)   BUN BLD 6 - 20 mg/dL 36(H) 36(H) 38(H)   CREATININE 0.5 - 1.4 mg/dL 2.0(H) 2.0(H) 2.3(H)   CALCIUM 8.7 - 10.5 mg/dL 8.8 8.8 9.3   PROTEIN TOTAL 6.0 - 8.4 g/dL 7.6 7.6 7.4   ALBUMIN 3.5 - 5.2 g/dL 3.4(L) 3.4(L) 3.0(L)   BILIRUBIN TOTAL 0.1 - 1.0 mg/dL 0.5 0.5 0.5   ALK PHOS 55 - 135 U/L 82 82 89   AST 10 - 40 U/L 17 17 17   ALT 10 - 44 U/L 15 15 15   ANION GAP 8 - 16 mmol/L 8 8 7(L)   EGFR IF AFRICAN AMERICAN >60 mL/min/1.73 m:2 41.9(A) 41.9(A) 35.1(A)   EGFR IF NON-AFRICAN AMERICAN >60 mL/min/1.73 m:2 36.2(A) 36.2(A) 30.4(A)     Pulmonary Function Tests 12/16/2019 9/16/2019 7/12/2019 6/10/2019 5/6/2019 3/22/2019 2/14/2019   FVC 2.85 3.17 3.18 3.18 3.23 3.21 3.09   FEV1 2.31 2.49 2.59 2.47 2.51 2.56 2.44   TLC (liters) - - - - - - -   DLCO (ml/mmHg sec) - - - - - - -   FVC% 59.9 66.6 64 65 66 70 68   FEV1% 62.5 67.2 68 66 67.1 70 66   FEF 25-75 - - - - 2.29 2.58 2.39   FEF 25-75% - - - - 72.3 69 64   TLC% - - - - - - -    RV - - - - - - -   RV% - - - - - - -   DLCO% - - - - - - -       Imaging:  Results for orders placed during the hospital encounter of 12/16/19   X-Ray Chest PA And Lateral    Narrative EXAMINATION:  XR CHEST PA AND LATERAL    CLINICAL HISTORY:  s/p bilateral lung transplant August 2017; Lung transplant status    TECHNIQUE:  PA and lateral views of the chest were performed.    COMPARISON:  No 09/16/2019 ne    FINDINGS:  Postoperative changes identified in the thorax.  Heart size normal.  Subsegmental atelectatic changes seen at the left lung base.  The lungs are otherwise clear.  No significant pleural effusion.      Impression See above      Electronically signed by: Sukhi Crooks MD  Date:    12/16/2019  Time:    10:28         Assessment:  1. Encounter following organ transplant    2. Complication of transplanted lung, unspecified complication    3. Immunosuppression    4. Prophylactic antibiotic    5. Chronic kidney disease (CKD), stage III (moderate)         Plan:   1. Decrease in FEV1 which could multifactorial (edema, viral infection). Will check respiratory infection panel and start furosemide as needed. Repeat PFT when his leg swelling has improved.     2. Continue tacrolimus, prednisone. He has been off MMF because of resistant CMV in the past.    3. Continue Bactrim    4. Will continue to monitor his renal function which is moderately decreased but stable. Advised him to take furosemide as needed for his swelling but no to over do it since it can worsen his renal function.     5. RTC in 3 months or earlier if necessary.

## 2019-12-18 ENCOUNTER — PATIENT MESSAGE (OUTPATIENT)
Dept: TRANSPLANT | Facility: CLINIC | Age: 59
End: 2019-12-18

## 2019-12-18 LAB — TACROLIMUS BLD-MCNC: 5.8 NG/ML (ref 5–15)

## 2019-12-30 ENCOUNTER — LAB VISIT (OUTPATIENT)
Dept: LAB | Facility: HOSPITAL | Age: 59
End: 2019-12-30
Attending: INTERNAL MEDICINE
Payer: COMMERCIAL

## 2019-12-30 DIAGNOSIS — Z94.2 LUNG REPLACED BY TRANSPLANT: ICD-10-CM

## 2019-12-30 PROCEDURE — 36415 COLL VENOUS BLD VENIPUNCTURE: CPT | Mod: PO

## 2020-01-02 ENCOUNTER — TELEPHONE (OUTPATIENT)
Dept: TRANSPLANT | Facility: CLINIC | Age: 60
End: 2020-01-02

## 2020-01-02 NOTE — TELEPHONE ENCOUNTER
Spoke with Ursula in lab to find out why CMV that was drawn on 12/30 has not resulted.  It was a send out so she told me to check tomorrow for the result.

## 2020-01-03 LAB — CMV DNA SERPL NAA+PROBE-ACNC: <35 IU/ML

## 2020-01-29 NOTE — NURSING
Patient discharged to home as ordered.  Left ambulatory, with all personal belongings and home IV infusion supplies.  Prior to discharge, reviewed medication changes with patient and discharge instructions.  Questions answered to his satisfaction.  
no

## 2020-02-21 DIAGNOSIS — I10 ESSENTIAL HYPERTENSION: ICD-10-CM

## 2020-02-21 DIAGNOSIS — Z79.4 TYPE 2 DIABETES MELLITUS WITH HYPERGLYCEMIA, WITH LONG-TERM CURRENT USE OF INSULIN: Chronic | ICD-10-CM

## 2020-02-21 DIAGNOSIS — E11.65 TYPE 2 DIABETES MELLITUS WITH HYPERGLYCEMIA, WITH LONG-TERM CURRENT USE OF INSULIN: Chronic | ICD-10-CM

## 2020-02-21 RX ORDER — AMLODIPINE BESYLATE 5 MG/1
TABLET ORAL
Qty: 90 TABLET | Refills: 3 | Status: SHIPPED | OUTPATIENT
Start: 2020-02-21 | End: 2020-07-16 | Stop reason: SDUPTHER

## 2020-02-21 RX ORDER — AMLODIPINE BESYLATE 5 MG/1
TABLET ORAL
Qty: 90 TABLET | Refills: 3 | Status: SHIPPED | OUTPATIENT
Start: 2020-02-21 | End: 2020-10-07 | Stop reason: ALTCHOICE

## 2020-02-21 RX ORDER — PRAVASTATIN SODIUM 20 MG/1
TABLET ORAL
Qty: 90 TABLET | Refills: 0 | Status: SHIPPED | OUTPATIENT
Start: 2020-02-21 | End: 2020-05-29

## 2020-02-28 NOTE — HPI
Mr. Martin Hendrix is a 59 YO male 17 months s/p bilateral lung transplant secondary to diagnosis of sarcoidosis with associated pulmonary hypertension. 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 Patient presents today as a direct admission for IV Foscarnet treatment.     Patient was noted to have an elevated CMV PCR of 8160 log 3.91 on 8/15/2018.  He was started on valganciclovir 900 mg PO BID; and initially his viral load decreased. However, CMV PCR in 12/2018 demonstrated elevating levels, so patient was referred to ID, Dr. Lowry. Patient was noted to have UL97 mutation and was a candidate for a clinic trial study with maribavir. Clinical trial was started on the week of 12/18/2018 and was well tolerated by patient. Viral load decreased to <35 on 1/10 and 1/18/2019; however, patient was noted to have an increasing CMV PCR level to 3500 on 2/6/2019.     Patient reports chronic, dry cough with no associated dyspnea. Patient does report having increased sinus congestion last week that is improving. Patient does note that he did have an associated productive cough with white sputum with the congestion last week. Patient denies any fevers, chills, myalgias, and sick contacts. Patient notes that his hoarseness fluctuates intermittently. Patient does report heartburn that is controlled on his current regimen of Pepcid. Patient also notes intermittent episodes of diarrhea that occur non-frequently. Denies any associated abdominal, nausea, or vomiting. Denies any chest pain or palpitations.    No

## 2020-03-16 RX ORDER — SULFAMETHOXAZOLE AND TRIMETHOPRIM 400; 80 MG/1; MG/1
1 TABLET ORAL
Qty: 15 TABLET | Refills: 11 | Status: ON HOLD | OUTPATIENT
Start: 2020-03-16 | End: 2021-01-13 | Stop reason: HOSPADM

## 2020-03-25 ENCOUNTER — PATIENT MESSAGE (OUTPATIENT)
Dept: TRANSPLANT | Facility: CLINIC | Age: 60
End: 2020-03-25

## 2020-03-26 ENCOUNTER — TELEPHONE (OUTPATIENT)
Dept: TRANSPLANT | Facility: CLINIC | Age: 60
End: 2020-03-26

## 2020-03-26 NOTE — TELEPHONE ENCOUNTER
Spoke to patient who had concerns that a fellow coworker was tested positive for covid 19. Patient asked if she should be tested as a precaution.  Patient is asymptomatic. After speaking to Dr. Bill, she recommends that he continue to monitor for any symptoms and to self quarantine for 14 days.  Explained to patient that Dr. Bill recommends he continue to monitor for any changes in symptoms.  He is to call us if he develops any symptoms particularly a fever of 100.4 or greater.  Patient states that he did not come in direct contact with this person but may have shared a common break room.  He states that the break room has been disinfected and he has gone through HR at work to answer questions regarding coming in contact with this coworker.  It has been 7 days since he has been aware of this.  He will contact his employer today to let them know of our recommendations and will take a week off at home.  Questions were answered to patient's satisfaction, patient verbalized his understanding to contact us if needed.

## 2020-04-18 DIAGNOSIS — E83.42 HYPOMAGNESEMIA: ICD-10-CM

## 2020-04-19 DIAGNOSIS — E83.42 HYPOMAGNESEMIA: ICD-10-CM

## 2020-04-20 DIAGNOSIS — E83.42 HYPOMAGNESEMIA: ICD-10-CM

## 2020-05-04 ENCOUNTER — PATIENT MESSAGE (OUTPATIENT)
Dept: ENDOCRINOLOGY | Facility: CLINIC | Age: 60
End: 2020-05-04

## 2020-05-11 DIAGNOSIS — Z94.2 LUNG REPLACED BY TRANSPLANT: ICD-10-CM

## 2020-05-11 RX ORDER — TACROLIMUS 0.5 MG/1
1.5 CAPSULE ORAL EVERY 12 HOURS
Qty: 180 CAPSULE | Refills: 11 | Status: CANCELLED | OUTPATIENT
Start: 2020-05-11

## 2020-05-13 DIAGNOSIS — Z94.2 LUNG REPLACED BY TRANSPLANT: ICD-10-CM

## 2020-05-14 RX ORDER — TACROLIMUS 0.5 MG/1
1.5 CAPSULE ORAL EVERY 12 HOURS
Qty: 180 CAPSULE | Refills: 11 | Status: SHIPPED | OUTPATIENT
Start: 2020-05-14 | End: 2021-02-08

## 2020-05-28 NOTE — PROGRESS NOTES
Subjective:      Patient ID: Martin Hendrix Jr. is a 59 y.o. male.    Chief Complaint:  Diabetes    History of Present Illness  Martin Hendrix Jr. presents today for follow up of type 2 DM.    The patient was diagnosed with Type 2 diabetes in ~2017 on labs.     Living back home in [place].    Double lung transplant in 8/2017      DIABETES COMPLICATIONS: nephropathy (micro x1)     DM MEDICATIONS USED IN THE PAST: Metformin, Glipizide, MDI while in hospital      CURRENT DIABETES MEDICATIONS: Humalog 7 units AC plus correction scale (150 +1), Tresiba 12 units nightly     BLOOD GLUCOSE MONITORING:    Checking BG 0-1x daily.  200 or greater      HYPOGLYCEMIA:  No  Knows how to correct with 15 grams of carbs- juice, coke, or glucose tablets.     MEALS:   Eating 2-3 meals per day   Snacks on fruit, chips  Water + lemon, rare juice    Diabetes Management Status  Statin: Taking  ACE/ARB: Not taking  Screening or Prevention Patient's value Goal Complete/Controlled?   HgA1C Testing and Control   Lab Results   Component Value Date    HGBA1C 11.5 (H) 09/16/2019    Annually/Less than 8% No   Lipid profile : 09/16/2019 Annually Yes   LDL control Lab Results   Component Value Date    LDLCALC 130.4 09/16/2019    Annually/Less than 100 mg/dl  No   Nephropathy screening Lab Results   Component Value Date    LABMICR 4756.0 09/16/2019     Lab Results   Component Value Date    PROTEINUA Negative 08/21/2017    Annually Yes   Blood pressure BP Readings from Last 1 Encounters:   06/01/20 (!) 152/86    Less than 140/90 No   Dilated retinal exam : 12/13/2017 Annually No   Foot exam   : 11/15/2017 Annually No     + pancreatitis, hospitalized (~2009), denies gastroparesis.    Feels as if neuropathy has worsened.     Review of Systems   Constitutional: Negative for fatigue.   Eyes: Negative for visual disturbance.   Respiratory: Negative for shortness of breath.    Cardiovascular: Negative for chest pain.   Gastrointestinal: Negative for  "abdominal pain.   Musculoskeletal: Negative for arthralgias.   Skin: Negative for wound.   Neurological: Negative for headaches.   Hematological: Does not bruise/bleed easily.   Psychiatric/Behavioral: Negative for sleep disturbance.       Objective:   Physical Exam   Constitutional: He appears well-developed.   Neck: No thyromegaly present.   Cardiovascular: Normal rate.   Pulmonary/Chest: Effort normal.   Abdominal: Soft.   Vitals reviewed.  Appropriate footwear, Foot exam deferred per patient.   No ulcers or wounds.   injection sites are ok. No lipo hypertropthy or atrophy      Visit Vitals  BP (!) 152/86   Pulse 86   Ht 5' 11" (1.803 m)   Wt 103.6 kg (228 lb 6.3 oz)   BMI 31.85 kg/m²      Lab Review:   Lab Results   Component Value Date    HGBA1C 11.5 (H) 09/16/2019    HGBA1C 10.9 (H) 02/12/2019    HGBA1C 8.5 (H) 10/04/2018      Lab Results   Component Value Date    CHOL 225 (H) 09/16/2019    HDL 45 09/16/2019    LDLCALC 130.4 09/16/2019    TRIG 248 (H) 09/16/2019    CHOLHDL 20.0 09/16/2019     Lab Results   Component Value Date     12/16/2019    K 3.7 12/16/2019     12/16/2019    CO2 29 12/16/2019     (H) 12/16/2019    BUN 38 (H) 12/16/2019    CREATININE 2.3 (H) 12/16/2019    CALCIUM 9.3 12/16/2019    PROT 7.4 12/16/2019    ALBUMIN 3.0 (L) 12/16/2019    BILITOT 0.5 12/16/2019    ALKPHOS 89 12/16/2019    AST 17 12/16/2019    ALT 15 12/16/2019    ANIONGAP 7 (L) 12/16/2019    ESTGFRAFRICA 35.1 (A) 12/16/2019    EGFRNONAA 30.4 (A) 12/16/2019    TSH 1.092 04/24/2017     No results found for: [id number]  Assessment and Plan     1. Type 2 diabetes mellitus with hyperglycemia, with long-term current use of insulin  Hemoglobin A1C    Comprehensive metabolic panel    Lipid Panel    Microalbumin/creatinine urine ratio    insulin degludec (TRESIBA FLEXTOUCH U-200) 200 unit/mL (3 mL) InPn    insulin lispro (HUMALOG KWIKPEN INSULIN) 100 unit/mL pen   2. Lung transplanted     3. Mixed hyperlipidemia   "   4. Essential hypertension       Above labs today  Type 2 diabetes mellitus with hyperglycemia, with long-term current use of insulin  -- A1c goal 6.5-7% due to age & co morbidities   -- Medications discussed:  MFM decreased GFR, SE in the past  GLP1-DPP4 + pancreatitis   [place]- on MDI, unsafe   SGLT2 - may be an option in the future if GFR increases   Insulin- wt base patient today   -- Reviewed logs/CGM:  BG are consistently above 200.     -- Medication Changes: Tresiba 26u HS & Humalog 8u AC + /25     -- Reviewed goals of therapy are to get the best control we can without hypoglycemia.  -- Reviewed patient's current insulin regimen. Clarified proper insulin dose and timing in relation to meals, etc. Insulin injection sites and proper rotation instructed.    -- Advised frequent self blood glucose monitoring.  Patient encouraged to document glucose results and bring them to every clinic visit.  -- Hypoglycemia precautions discussed. Instructed on precautions before driving.    -- Call for Bg repeatedly < 90 or > 180.   -- Close adherence to lifestyle changes recommended.   -- Periodic follow ups for eye evaluations, foot care and dental care suggested.    Lung transplanted  Optimize BG control   -- Continue following with LTS    Mixed hyperlipidemia  LP today  On statin per ADA recommendations    Essential hypertension  -- Elevated at today's visit  -- Patient admits to not taking BP medication this AM.  -- Encouraged patient to take BP medication every morning and to monitor BP at home.  Notify PCP is sustaining >130/80.  -- patient states he will go straight to his truck and get his BP medication   -- Blood pressure goals discussed with patient      Follow up in about 6 weeks (around 7/13/2020).

## 2020-05-29 ENCOUNTER — TELEPHONE (OUTPATIENT)
Dept: ENDOCRINOLOGY | Facility: CLINIC | Age: 60
End: 2020-05-29

## 2020-05-29 RX ORDER — PRAVASTATIN SODIUM 20 MG/1
TABLET ORAL
Qty: 90 TABLET | Refills: 0 | Status: SHIPPED | OUTPATIENT
Start: 2020-05-29 | End: 2020-07-21 | Stop reason: SDUPTHER

## 2020-06-01 ENCOUNTER — PATIENT MESSAGE (OUTPATIENT)
Dept: ENDOCRINOLOGY | Facility: CLINIC | Age: 60
End: 2020-06-01

## 2020-06-01 ENCOUNTER — TELEPHONE (OUTPATIENT)
Dept: ENDOCRINOLOGY | Facility: CLINIC | Age: 60
End: 2020-06-01

## 2020-06-01 ENCOUNTER — LAB VISIT (OUTPATIENT)
Dept: LAB | Facility: HOSPITAL | Age: 60
End: 2020-06-01
Payer: MEDICARE

## 2020-06-01 ENCOUNTER — OFFICE VISIT (OUTPATIENT)
Dept: ENDOCRINOLOGY | Facility: CLINIC | Age: 60
End: 2020-06-01
Payer: MEDICARE

## 2020-06-01 VITALS
SYSTOLIC BLOOD PRESSURE: 152 MMHG | DIASTOLIC BLOOD PRESSURE: 86 MMHG | HEART RATE: 86 BPM | WEIGHT: 228.38 LBS | HEIGHT: 71 IN | BODY MASS INDEX: 31.97 KG/M2

## 2020-06-01 DIAGNOSIS — E11.65 TYPE 2 DIABETES MELLITUS WITH HYPERGLYCEMIA, WITH LONG-TERM CURRENT USE OF INSULIN: ICD-10-CM

## 2020-06-01 DIAGNOSIS — E11.65 TYPE 2 DIABETES MELLITUS WITH HYPERGLYCEMIA, WITH LONG-TERM CURRENT USE OF INSULIN: Primary | ICD-10-CM

## 2020-06-01 DIAGNOSIS — Z79.4 TYPE 2 DIABETES MELLITUS WITH HYPERGLYCEMIA, WITH LONG-TERM CURRENT USE OF INSULIN: Primary | ICD-10-CM

## 2020-06-01 DIAGNOSIS — Z94.2 LUNG TRANSPLANTED: ICD-10-CM

## 2020-06-01 DIAGNOSIS — Z79.4 TYPE 2 DIABETES MELLITUS WITH HYPERGLYCEMIA, WITH LONG-TERM CURRENT USE OF INSULIN: ICD-10-CM

## 2020-06-01 DIAGNOSIS — I10 ESSENTIAL HYPERTENSION: ICD-10-CM

## 2020-06-01 DIAGNOSIS — E78.2 MIXED HYPERLIPIDEMIA: ICD-10-CM

## 2020-06-01 LAB
ALBUMIN SERPL BCP-MCNC: 3.2 G/DL (ref 3.5–5.2)
ALP SERPL-CCNC: 101 U/L (ref 55–135)
ALT SERPL W/O P-5'-P-CCNC: 15 U/L (ref 10–44)
ANION GAP SERPL CALC-SCNC: 10 MMOL/L (ref 8–16)
AST SERPL-CCNC: 12 U/L (ref 10–40)
BILIRUB SERPL-MCNC: 0.3 MG/DL (ref 0.1–1)
BUN SERPL-MCNC: 43 MG/DL (ref 6–20)
CALCIUM SERPL-MCNC: 8.5 MG/DL (ref 8.7–10.5)
CHLORIDE SERPL-SCNC: 99 MMOL/L (ref 95–110)
CHOLEST SERPL-MCNC: 307 MG/DL (ref 120–199)
CHOLEST/HDLC SERPL: 6.7 {RATIO} (ref 2–5)
CO2 SERPL-SCNC: 23 MMOL/L (ref 23–29)
CREAT SERPL-MCNC: 3.2 MG/DL (ref 0.5–1.4)
EST. GFR  (AFRICAN AMERICAN): 23.6 ML/MIN/1.73 M^2
EST. GFR  (NON AFRICAN AMERICAN): 20.4 ML/MIN/1.73 M^2
ESTIMATED AVG GLUCOSE: ABNORMAL MG/DL (ref 68–131)
GLUCOSE SERPL-MCNC: 593 MG/DL (ref 70–110)
HBA1C MFR BLD HPLC: >14 % (ref 4–5.6)
HDLC SERPL-MCNC: 46 MG/DL (ref 40–75)
HDLC SERPL: 15 % (ref 20–50)
LDLC SERPL CALC-MCNC: ABNORMAL MG/DL (ref 63–159)
NONHDLC SERPL-MCNC: 261 MG/DL
POTASSIUM SERPL-SCNC: 4.1 MMOL/L (ref 3.5–5.1)
PROT SERPL-MCNC: 7.4 G/DL (ref 6–8.4)
SODIUM SERPL-SCNC: 132 MMOL/L (ref 136–145)
TRIGL SERPL-MCNC: 440 MG/DL (ref 30–150)

## 2020-06-01 PROCEDURE — 99999 PR PBB SHADOW E&M-EST. PATIENT-LVL III: ICD-10-PCS | Mod: PBBFAC,,, | Performed by: NURSE PRACTITIONER

## 2020-06-01 PROCEDURE — 36415 COLL VENOUS BLD VENIPUNCTURE: CPT

## 2020-06-01 PROCEDURE — 80061 LIPID PANEL: CPT

## 2020-06-01 PROCEDURE — 99999 PR PBB SHADOW E&M-EST. PATIENT-LVL III: CPT | Mod: PBBFAC,,, | Performed by: NURSE PRACTITIONER

## 2020-06-01 PROCEDURE — 99213 OFFICE O/P EST LOW 20 MIN: CPT | Mod: PBBFAC | Performed by: NURSE PRACTITIONER

## 2020-06-01 PROCEDURE — 99214 OFFICE O/P EST MOD 30 MIN: CPT | Mod: S$PBB,,, | Performed by: NURSE PRACTITIONER

## 2020-06-01 PROCEDURE — 83036 HEMOGLOBIN GLYCOSYLATED A1C: CPT

## 2020-06-01 PROCEDURE — 80053 COMPREHEN METABOLIC PANEL: CPT

## 2020-06-01 PROCEDURE — 99214 PR OFFICE/OUTPT VISIT, EST, LEVL IV, 30-39 MIN: ICD-10-PCS | Mod: S$PBB,,, | Performed by: NURSE PRACTITIONER

## 2020-06-01 RX ORDER — INSULIN DEGLUDEC 200 U/ML
26 INJECTION, SOLUTION SUBCUTANEOUS NIGHTLY
Qty: 15 ML | Refills: 11 | Status: SHIPPED | OUTPATIENT
Start: 2020-06-01 | End: 2020-07-21

## 2020-06-01 RX ORDER — INSULIN LISPRO 100 [IU]/ML
8 INJECTION, SOLUTION INTRAVENOUS; SUBCUTANEOUS
Qty: 15 ML | Refills: 11 | Status: SHIPPED | OUTPATIENT
Start: 2020-06-01 | End: 2020-07-21

## 2020-06-01 NOTE — TELEPHONE ENCOUNTER
----- Message from Mary Newton sent at 6/1/2020  9:26 AM CDT -----  Contact: John Ext [phone]  John with Lab calling to speak with Nurse.  States that he has a Critical

## 2020-06-01 NOTE — TELEPHONE ENCOUNTER
Called pt and notified to call us  asap if his sugars rises above 200 and he can not get it down.  Advise pt  to drink a lot of water and avoid sugary drinks/food over the next few days.     Pt verbalized understand.

## 2020-06-01 NOTE — TELEPHONE ENCOUNTER
Pt's BG at this time is 300, he just took 8 units with his meal &  Correction for a total of 18 units and is about to eat lunch.     He will call me back and let me know his BG at 2 pm.

## 2020-06-01 NOTE — ASSESSMENT & PLAN NOTE
-- A1c goal 6.5-7% due to age & co morbidities   -- Medications discussed:  MFM decreased GFR, SE in the past  GLP1-DPP4 + pancreatitis   [place]- on MDI, unsafe   SGLT2 - may be an option in the future if GFR increases   Insulin- wt base patient today   -- Reviewed logs/CGM:  BG are consistently above 200.     -- Medication Changes: Tresiba 26u HS & Humalog 8u AC + /25     -- Reviewed goals of therapy are to get the best control we can without hypoglycemia.  -- Reviewed patient's current insulin regimen. Clarified proper insulin dose and timing in relation to meals, etc. Insulin injection sites and proper rotation instructed.    -- Advised frequent self blood glucose monitoring.  Patient encouraged to document glucose results and bring them to every clinic visit.  -- Hypoglycemia precautions discussed. Instructed on precautions before driving.    -- Call for Bg repeatedly < 90 or > 180.   -- Close adherence to lifestyle changes recommended.   -- Periodic follow ups for eye evaluations, foot care and dental care suggested.

## 2020-06-01 NOTE — TELEPHONE ENCOUNTER
----- Message from Charlotte Prieto NP sent at 6/1/2020 10:20 AM CDT -----  Please schedule education asap and alert patient.    Thank you,   Charlotte

## 2020-06-01 NOTE — TELEPHONE ENCOUNTER
Hi, we dont have access to schedule for  DM education . As long the order is in epic Education dept will schedule pt.

## 2020-06-01 NOTE — TELEPHONE ENCOUNTER
Called pt and discussed that BG was 543 on labs this AM.  Patient states that he feels fine, had [employer] breakfast with OJ this AM and did not take insulin prior to appt.    He took insulin when he left and is currently at the grocery store. Does not have his meter with him.    I will call pt back in 45 minutes, he will be home and will be able to take his sugar.    Plan for pt to see education prior to his next visit with me

## 2020-06-01 NOTE — TELEPHONE ENCOUNTER
----- Message from Charlotte Prieto NP sent at 6/1/2020  1:30 PM CDT -----  Please schedule nephrology appt and alert patient.    Thank you,   Charlotte

## 2020-06-22 ENCOUNTER — OFFICE VISIT (OUTPATIENT)
Dept: NEPHROLOGY | Facility: CLINIC | Age: 60
End: 2020-06-22
Payer: MEDICARE

## 2020-06-22 VITALS
HEART RATE: 87 BPM | BODY MASS INDEX: 32.5 KG/M2 | HEIGHT: 71 IN | TEMPERATURE: 98 F | SYSTOLIC BLOOD PRESSURE: 144 MMHG | DIASTOLIC BLOOD PRESSURE: 82 MMHG | WEIGHT: 232.13 LBS | OXYGEN SATURATION: 97 %

## 2020-06-22 DIAGNOSIS — E11.65 TYPE 2 DIABETES MELLITUS WITH HYPERGLYCEMIA, WITH LONG-TERM CURRENT USE OF INSULIN: ICD-10-CM

## 2020-06-22 DIAGNOSIS — Z94.2 LUNG REPLACED BY TRANSPLANT: ICD-10-CM

## 2020-06-22 DIAGNOSIS — I10 ESSENTIAL HYPERTENSION: ICD-10-CM

## 2020-06-22 DIAGNOSIS — N18.30 CKD (CHRONIC KIDNEY DISEASE) STAGE 3, GFR 30-59 ML/MIN: Primary | ICD-10-CM

## 2020-06-22 DIAGNOSIS — D86.0 SARCOIDOSIS OF LUNG: ICD-10-CM

## 2020-06-22 DIAGNOSIS — E78.2 MIXED HYPERLIPIDEMIA: ICD-10-CM

## 2020-06-22 DIAGNOSIS — I48.0 PAROXYSMAL ATRIAL FIBRILLATION: ICD-10-CM

## 2020-06-22 DIAGNOSIS — E87.1 HYPONATREMIA: ICD-10-CM

## 2020-06-22 DIAGNOSIS — E66.09 CLASS 1 OBESITY DUE TO EXCESS CALORIES WITH SERIOUS COMORBIDITY AND BODY MASS INDEX (BMI) OF 30.0 TO 30.9 IN ADULT: ICD-10-CM

## 2020-06-22 DIAGNOSIS — T86.810 ACUTE REJECTION OF LUNG TRANSPLANT: ICD-10-CM

## 2020-06-22 DIAGNOSIS — Z79.4 TYPE 2 DIABETES MELLITUS WITH HYPERGLYCEMIA, WITH LONG-TERM CURRENT USE OF INSULIN: ICD-10-CM

## 2020-06-22 DIAGNOSIS — D84.9 IMMUNOSUPPRESSION: ICD-10-CM

## 2020-06-22 PROCEDURE — 99204 PR OFFICE/OUTPT VISIT, NEW, LEVL IV, 45-59 MIN: ICD-10-PCS | Mod: S$PBB,,, | Performed by: INTERNAL MEDICINE

## 2020-06-22 PROCEDURE — 99204 OFFICE O/P NEW MOD 45 MIN: CPT | Mod: S$PBB,,, | Performed by: INTERNAL MEDICINE

## 2020-06-22 PROCEDURE — 99999 PR PBB SHADOW E&M-EST. PATIENT-LVL III: CPT | Mod: PBBFAC,,, | Performed by: INTERNAL MEDICINE

## 2020-06-22 PROCEDURE — 99213 OFFICE O/P EST LOW 20 MIN: CPT | Mod: PBBFAC,PO | Performed by: INTERNAL MEDICINE

## 2020-06-22 PROCEDURE — 99999 PR PBB SHADOW E&M-EST. PATIENT-LVL III: ICD-10-PCS | Mod: PBBFAC,,, | Performed by: INTERNAL MEDICINE

## 2020-06-22 NOTE — PROGRESS NOTES
Subjective:       Patient ID: Martin Hendrix Jr. is a 59 y.o. White male who presents for new evaluation of Chronic Kidney Disease and Acute Kidney Injury    HPI     He is referred by Endocrine for AVILA on CKD    He has a significant history of lung transplant and post transplant DM, HTN. His baseline creatinine is 1.6-2mg/dL but most recent sCr was 3.2 but associated with a glucose of 600.     He denies foamy urine, no hematuria and no flank pain. He follows a somewhat low sodium diet (dines out frequently, lives alone) but feels he stays hydrated. He has developed LE edema since transplant when he was started on Norvasc. No SOB nor orthopnea. No NSAID use and no herbal medications. No known kidney disease in his family     Review of Systems   Constitutional: Negative for activity change, appetite change, fatigue and unexpected weight change.   HENT: Negative for facial swelling.    Eyes: Negative for visual disturbance.   Respiratory: Negative for cough and shortness of breath.    Cardiovascular: Negative for chest pain and leg swelling.   Gastrointestinal: Negative for abdominal pain.   Genitourinary: Negative for difficulty urinating, dysuria, frequency, hematuria and urgency.   Musculoskeletal: Negative for arthralgias.   Allergic/Immunologic: Positive for immunocompromised state.   Neurological: Negative for weakness and headaches.   Hematological: Does not bruise/bleed easily.   Psychiatric/Behavioral: Negative for confusion and decreased concentration.       Objective:      Physical Exam  Vitals signs and nursing note reviewed.   Constitutional:       General: He is not in acute distress.     Appearance: Normal appearance. He is not ill-appearing.   HENT:      Head: Atraumatic.      Mouth/Throat:      Mouth: Mucous membranes are moist.   Eyes:      General: No scleral icterus.     Conjunctiva/sclera: Conjunctivae normal.   Neck:      Vascular: No JVD.   Cardiovascular:      Rate and Rhythm: Normal rate.       Heart sounds: S1 normal and S2 normal. No murmur.   Pulmonary:      Breath sounds: No wheezing or rales.   Abdominal:      Palpations: Abdomen is soft.   Musculoskeletal:      Right lower leg: Edema present.      Left lower leg: Edema present.   Skin:     General: Skin is warm and dry.      Coloration: Skin is not jaundiced.   Neurological:      General: No focal deficit present.      Mental Status: He is oriented to person, place, and time.   Psychiatric:         Mood and Affect: Mood normal.         Behavior: Behavior is cooperative.         Thought Content: Thought content normal.         Assessment:       1. CKD (chronic kidney disease) stage 3, GFR 30-59 ml/min    2. Type 2 diabetes mellitus with hyperglycemia, with long-term current use of insulin    3. Lung replaced by transplant    4. Acute rejection of lung transplant    5. Paroxysmal atrial fibrillation    6. Essential hypertension    7. Mixed hyperlipidemia    8. Hyponatremia    9. 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 Class 1 obesity due to excess calories with serious comorbidity and body mass index (BMI) of 30.0 to 30.9 in adult        Plan:           Acute on CKD  - volume depletion from glycosuria/osmotic diuresis  - repeat chemistries with urine studies and check renal u/s  - trend proteinuria  - Patient was advised to avoid NSAIDs, start a low sodium diet, and ensure hydration    HTN  - will eventually switch Norvasc to an ARB    Mineral and Bone Disease  - check PTH and D levels    DM  - per Endocrine       Labs and renal u/s then RTC to review results

## 2020-06-23 DIAGNOSIS — Z01.812 PRE-PROCEDURE LAB EXAM: Primary | ICD-10-CM

## 2020-06-28 ENCOUNTER — TELEPHONE (OUTPATIENT)
Dept: NEPHROLOGY | Facility: CLINIC | Age: 60
End: 2020-06-28

## 2020-06-28 DIAGNOSIS — N18.30 CKD (CHRONIC KIDNEY DISEASE) STAGE 3, GFR 30-59 ML/MIN: Primary | ICD-10-CM

## 2020-06-28 DIAGNOSIS — N17.9 AKI (ACUTE KIDNEY INJURY): ICD-10-CM

## 2020-06-29 ENCOUNTER — LAB VISIT (OUTPATIENT)
Dept: FAMILY MEDICINE | Facility: CLINIC | Age: 60
End: 2020-06-29
Payer: MEDICARE

## 2020-06-29 ENCOUNTER — TELEPHONE (OUTPATIENT)
Dept: NEPHROLOGY | Facility: CLINIC | Age: 60
End: 2020-06-29

## 2020-06-29 DIAGNOSIS — Z01.812 PRE-PROCEDURE LAB EXAM: ICD-10-CM

## 2020-06-29 PROCEDURE — U0003 INFECTIOUS AGENT DETECTION BY NUCLEIC ACID (DNA OR RNA); SEVERE ACUTE RESPIRATORY SYNDROME CORONAVIRUS 2 (SARS-COV-2) (CORONAVIRUS DISEASE [COVID-19]), AMPLIFIED PROBE TECHNIQUE, MAKING USE OF HIGH THROUGHPUT TECHNOLOGIES AS DESCRIBED BY CMS-2020-01-R: HCPCS

## 2020-06-30 ENCOUNTER — HOSPITAL ENCOUNTER (OUTPATIENT)
Dept: RADIOLOGY | Facility: HOSPITAL | Age: 60
Discharge: HOME | End: 2020-06-30
Attending: INTERNAL MEDICINE
Payer: MEDICARE

## 2020-06-30 DIAGNOSIS — N17.9 AKI (ACUTE KIDNEY INJURY): ICD-10-CM

## 2020-06-30 DIAGNOSIS — N18.30 CKD (CHRONIC KIDNEY DISEASE) STAGE 3, GFR 30-59 ML/MIN: ICD-10-CM

## 2020-06-30 LAB — SARS-COV-2 RNA RESP QL NAA+PROBE: NOT DETECTED

## 2020-06-30 PROCEDURE — 76770 US EXAM ABDO BACK WALL COMP: CPT | Mod: 26,,, | Performed by: RADIOLOGY

## 2020-06-30 PROCEDURE — 76770 US EXAM ABDO BACK WALL COMP: CPT | Mod: TC,PO

## 2020-06-30 PROCEDURE — 76770 US RETROPERITONEAL COMPLETE: ICD-10-PCS | Mod: 26,,, | Performed by: RADIOLOGY

## 2020-07-01 ENCOUNTER — PATIENT MESSAGE (OUTPATIENT)
Dept: NEPHROLOGY | Facility: CLINIC | Age: 60
End: 2020-07-01

## 2020-07-01 ENCOUNTER — HOSPITAL ENCOUNTER (OUTPATIENT)
Dept: RADIOLOGY | Facility: HOSPITAL | Age: 60
Discharge: HOME | End: 2020-07-01
Attending: INTERNAL MEDICINE
Payer: COMMERCIAL

## 2020-07-01 ENCOUNTER — HOSPITAL ENCOUNTER (OUTPATIENT)
Dept: PULMONOLOGY | Facility: HOSPITAL | Age: 60
Discharge: HOME | End: 2020-07-01
Attending: INTERNAL MEDICINE
Payer: MEDICARE

## 2020-07-01 ENCOUNTER — OFFICE VISIT (OUTPATIENT)
Dept: TRANSPLANT | Facility: CLINIC | Age: 60
End: 2020-07-01
Payer: MEDICARE

## 2020-07-01 VITALS
TEMPERATURE: 98 F | HEIGHT: 71 IN | SYSTOLIC BLOOD PRESSURE: 174 MMHG | DIASTOLIC BLOOD PRESSURE: 103 MMHG | BODY MASS INDEX: 32.2 KG/M2 | WEIGHT: 230 LBS | RESPIRATION RATE: 20 BRPM | HEART RATE: 84 BPM | OXYGEN SATURATION: 97 %

## 2020-07-01 DIAGNOSIS — T86.819 COMPLICATION OF TRANSPLANTED LUNG, UNSPECIFIED COMPLICATION: ICD-10-CM

## 2020-07-01 DIAGNOSIS — N18.30 CHRONIC KIDNEY DISEASE (CKD), STAGE III (MODERATE): ICD-10-CM

## 2020-07-01 DIAGNOSIS — Z79.2 PROPHYLACTIC ANTIBIOTIC: ICD-10-CM

## 2020-07-01 DIAGNOSIS — Z94.2 LUNG TRANSPLANTED: ICD-10-CM

## 2020-07-01 DIAGNOSIS — N18.30 CKD (CHRONIC KIDNEY DISEASE) STAGE 3, GFR 30-59 ML/MIN: Primary | ICD-10-CM

## 2020-07-01 DIAGNOSIS — D84.9 IMMUNOSUPPRESSION: ICD-10-CM

## 2020-07-01 DIAGNOSIS — Z48.298 ENCOUNTER FOLLOWING ORGAN TRANSPLANT: Primary | ICD-10-CM

## 2020-07-01 LAB
FEF 25 75 LLN: 1.58
FEF 25 75 PRE REF: 77 %
FEF 25 75 REF: 3.14
FEV05 LLN: 1.73
FEV05 REF: 2.86
FEV1 FVC LLN: 66
FEV1 FVC PRE REF: 105.3 %
FEV1 FVC REF: 78
FEV1 LLN: 2.8
FEV1 PRE REF: 62.9 %
FEV1 REF: 3.68
FVC LLN: 3.63
FVC PRE REF: 59.6 %
FVC REF: 4.73
PEF LLN: 7.12
PEF PRE REF: 53.2 %
PEF REF: 9.43
PRE FEF 25 75: 2.42 L/S (ref 1.58–5.22)
PRE FET 100: 6.04 SEC
PRE FEV05 REF: 62.9 %
PRE FEV1 FVC: 81.97 % (ref 66.18–88.01)
PRE FEV1: 2.31 L (ref 2.8–4.51)
PRE FEV5: 1.8 L (ref 1.73–4)
PRE FVC: 2.82 L (ref 3.63–5.85)
PRE PEF: 5.02 L/S (ref 7.12–11.75)

## 2020-07-01 PROCEDURE — 99215 OFFICE O/P EST HI 40 MIN: CPT | Mod: PBBFAC,25 | Performed by: INTERNAL MEDICINE

## 2020-07-01 PROCEDURE — 94010 BREATHING CAPACITY TEST: ICD-10-PCS | Mod: 26,,, | Performed by: INTERNAL MEDICINE

## 2020-07-01 PROCEDURE — 99214 OFFICE O/P EST MOD 30 MIN: CPT | Mod: 25,S$PBB,, | Performed by: INTERNAL MEDICINE

## 2020-07-01 PROCEDURE — 94010 BREATHING CAPACITY TEST: CPT | Mod: 26,,, | Performed by: INTERNAL MEDICINE

## 2020-07-01 PROCEDURE — 71046 X-RAY EXAM CHEST 2 VIEWS: CPT | Mod: TC

## 2020-07-01 PROCEDURE — 99999 PR PBB SHADOW E&M-EST. PATIENT-LVL V: ICD-10-PCS | Mod: PBBFAC,,, | Performed by: INTERNAL MEDICINE

## 2020-07-01 PROCEDURE — 71046 X-RAY EXAM CHEST 2 VIEWS: CPT | Mod: 26,,, | Performed by: RADIOLOGY

## 2020-07-01 PROCEDURE — 99999 PR PBB SHADOW E&M-EST. PATIENT-LVL V: CPT | Mod: PBBFAC,,, | Performed by: INTERNAL MEDICINE

## 2020-07-01 PROCEDURE — 71046 XR CHEST PA AND LATERAL: ICD-10-PCS | Mod: 26,,, | Performed by: RADIOLOGY

## 2020-07-01 PROCEDURE — 99214 PR OFFICE/OUTPT VISIT, EST, LEVL IV, 30-39 MIN: ICD-10-PCS | Mod: 25,S$PBB,, | Performed by: INTERNAL MEDICINE

## 2020-07-01 NOTE — PROGRESS NOTES
LUNG TRANSPLANT RECIPIENT FOLLOW-UP    Reason for Visit: Follow-up after lung transplantation.                               Date of Transplant: 8/22/17     Reason for Transplant: Sarcoidosis with pulmonary hypertension     Type of Transplant: bilateral sequential lung     CMV Status: D+ / R-      Major Complications:   1. Left vocal cord dysfunction   2. 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 is a 59 y.o. year old male with the above listed transplant history who presents today for routine follow-up.  Since his last visit, he has been doing well. Denies cough, shortness of breath or heartburn. Has been having increase in lower extremities and has not been taking a diuretic because of fear that it will worsen his kidney function. He will see his Nephrologist in the next two weeks.     Review of Systems   Constitutional: Negative for chills, diaphoresis, fever, malaise/fatigue and weight loss.   HENT: Negative for congestion, ear discharge, ear pain, hearing loss, nosebleeds, sinus pain, sore throat and tinnitus.    Eyes: Negative for blurred vision, double vision, photophobia, pain, discharge and redness.   Respiratory: Negative for cough, hemoptysis, sputum production, shortness of breath, wheezing and stridor.    Cardiovascular: Positive for leg swelling. Negative for chest pain, palpitations, orthopnea, claudication and PND.   Gastrointestinal: Negative for abdominal pain, blood in stool, constipation, diarrhea, heartburn, melena, nausea and vomiting.   Genitourinary: Negative for dysuria, flank pain, frequency, hematuria and urgency.   Musculoskeletal: Negative for back pain, falls, joint pain, myalgias and neck pain.   Skin: Negative for  "itching and rash.   Neurological: Negative for dizziness, tingling, tremors, sensory change, speech change, focal weakness, seizures, loss of consciousness, weakness and headaches.   Endo/Heme/Allergies: Negative for environmental allergies and polydipsia. Does not bruise/bleed easily.   Psychiatric/Behavioral: Negative for depression, hallucinations, memory loss, substance abuse and suicidal ideas. The patient is not nervous/anxious and does not have insomnia.      BP (!) 174/103   Pulse 84   Temp 97.8 °F (36.6 °C) (Oral)   Resp 20   Ht 5' 11" (1.803 m)   Wt 104.3 kg (230 lb)   SpO2 97%   BMI 32.08 kg/m²     Physical Exam   Constitutional: He is oriented to person, place, and time and well-developed, well-nourished, and in no distress. No distress.   HENT:   Head: Normocephalic and atraumatic.   Nose: Nose normal.   Mouth/Throat: Oropharynx is clear and moist. No oropharyngeal exudate.   Eyes: Pupils are equal, round, and reactive to light. Conjunctivae and EOM are normal. No scleral icterus.   Neck: Normal range of motion. Neck supple. No JVD present. No tracheal deviation present. No thyromegaly present.   Cardiovascular: Normal rate, regular rhythm, normal heart sounds and intact distal pulses. Exam reveals no gallop and no friction rub.   No murmur heard.  Pulmonary/Chest: Effort normal. No stridor. No respiratory distress. He has no wheezes. He has no rales. He exhibits no tenderness.   Abdominal: Soft. Bowel sounds are normal. He exhibits no distension and no mass. There is no abdominal tenderness. There is no rebound and no guarding.   Musculoskeletal: Normal range of motion.         General: Edema (pitting edema of lower extremities) present. No tenderness or deformity.   Lymphadenopathy:     He has no cervical adenopathy.   Neurological: He is alert and oriented to person, place, and time. No cranial nerve deficit. Gait normal. Coordination normal.   Skin: Skin is warm and dry. No rash noted. He is " not diaphoretic. No erythema. No pallor.   Psychiatric: Mood, memory, affect and judgment normal.   Nursing note and vitals reviewed.    Labs:  cbc, cmp, tacrolimus Latest Ref Rng & Units 6/1/2020 7/1/2020 7/1/2020   TACROLIMUS LVL 5.0 - 15.0 ng/mL - - -   WHITE BLOOD CELL COUNT 3.90 - 12.70 K/uL - 10.14 -   RBC 4.60 - 6.20 M/uL - 3.92(L) -   HEMOGLOBIN 14.0 - 18.0 g/dL - 11.3(L) -   HEMATOCRIT 40.0 - 54.0 % - 36.5(L) -   MCV 82 - 98 fL - 93 -   MCH 27.0 - 31.0 pg - 28.8 -   MCHC 32.0 - 36.0 g/dL - 31.0(L) -   RDW 11.5 - 14.5 % - 12.7 -   PLATELETS 150 - 350 K/uL - 323 -   MPV 9.2 - 12.9 fL - 8.9(L) -   GRAN # 1.8 - 7.7 K/uL - 5.0 -   LYMPH # 1.0 - 4.8 K/uL - 3.0 -   MONO # 0.3 - 1.0 K/uL - 1.2(H) -   EOSINOPHIL% 0.0 - 8.0 % - 6.0 -   BASOPHIL% 0.0 - 1.9 % - 1.2 -   DIFFERENTIAL METHOD - - Automated -   SODIUM 136 - 145 mmol/L 132(L) 140 140   POTASSIUM 3.5 - 5.1 mmol/L 4.1 4.3 4.3   CHLORIDE 95 - 110 mmol/L 99 105 105   CO2 23 - 29 mmol/L 23 27 27   GLUCOSE 70 - 110 mg/dL 593(HH) 143(H) 143(H)   BUN BLD 6 - 20 mg/dL 43(H) 43(H) 43(H)   CREATININE 0.5 - 1.4 mg/dL 3.2(H) 3.5(H) 3.5(H)   CALCIUM 8.7 - 10.5 mg/dL 8.5(L) 8.7 8.7   PROTEIN TOTAL 6.0 - 8.4 g/dL 7.4 7.0 -   ALBUMIN 3.5 - 5.2 g/dL 3.2(L) 2.9(L) 2.9(L)   BILIRUBIN TOTAL 0.1 - 1.0 mg/dL 0.3 0.3 -   ALK PHOS 55 - 135 U/L 101 88 -   AST 10 - 40 U/L 12 15 -   ALT 10 - 44 U/L 15 14 -   ANION GAP 8 - 16 mmol/L 10 8 8   EGFR IF AFRICAN AMERICAN >60 mL/min/1.73 m:2 23.6(A) 21.1(A) 21.1(A)   EGFR IF NON-AFRICAN AMERICAN >60 mL/min/1.73 m:2 20.4(A) 18.3(A) 18.3(A)     Pulmonary Function Tests 7/1/2020 12/16/2019 9/16/2019 7/12/2019 6/10/2019 5/6/2019 3/22/2019   FVC 2.82 2.85 3.17 3.18 3.18 3.23 3.21   FEV1 2.31 2.31 2.49 2.59 2.47 2.51 2.56   TLC (liters) - - - - - - -   DLCO (ml/mmHg sec) - - - - - - -   FVC% 59.6 59.9 66.6 64 65 66 70   FEV1% 62.9 62.5 67.2 68 66 67.1 70   FEF 25-75 - - - - - 2.29 2.58   FEF 25-75% - - - - - 72.3 69   TLC% - - - - - - -   RV - - -  - - - -   RV% - - - - - - -   DLCO% - - - - - - -       Imaging:  Results for orders placed during the hospital encounter of 07/01/20   X-Ray Chest PA And Lateral    Narrative EXAMINATION:  XR CHEST PA AND LATERAL    CLINICAL HISTORY:  s/p bilateral lung transplant August 2017; Lung transplant status    TECHNIQUE:  PA and lateral views of the chest were performed.    COMPARISON:  Posttransplant chest radiographs: 12/16/2019.  09/16/2019.  03/22/2019.    FINDINGS:  Status post bilateral sequential lung transplant August 2017.    Eight sternal wires are intact and aligned.  There is abundant cardiophrenic fat and blunting of the left lateral and posterior costophrenic angles and soft tissue along the lateral aspects of the susy thoraces on PA views, left greater than right; these findings are stable compared with multiple prior studies.    Mediastinal structures are midline.  Cardiac silhouette and pulmonary vascular distribution are normal.    I detect no acute pulmonary disease, acute pleural disease, cardiac decompensation, pneumothorax, pneumomediastinum, pneumoperitoneum or significant osseous abnormality.      Impression No acute disease identified.      Electronically signed by: Sena Baum MD  Date:    07/01/2020  Time:    08:07           Assessment:  1. Encounter following organ transplant    2. Complication of transplanted lung, unspecified complication    3. Immunosuppression    4. Prophylactic antibiotic    5. Chronic kidney disease (CKD), stage III (moderate)       Plan:   1. Decrease in FEV1 which could multifactorial (edema, viral infection). Will check respiratory infection panel and start furosemide as needed. CXR's were reviewed and he has developed and LLL density which I believe could be a pleural effusion from his CKD. Will check CT today and determine wether there is fluid to be drained.     2. Continue tacrolimus, prednisone. He has been off MMF because of resistant CMV in the past. May  consider everolimus.    3. Continue Bactrim    4. Will continue to monitor his renal function which is moderately decreased but stable. Advised him to take furosemide as needed for his swelling but no to over do it since it can worsen his renal function. He will follow up with Nephrology.    5. RTC in 3 months or earlier if necessary.      Darrick Wolfe MD  Pulmonary/Critical Care and Lung Transplantation  [facility]

## 2020-07-02 ENCOUNTER — HOSPITAL ENCOUNTER (OUTPATIENT)
Dept: RADIOLOGY | Facility: HOSPITAL | Age: 60
Discharge: HOME | End: 2020-07-02
Attending: INTERNAL MEDICINE
Payer: MEDICARE

## 2020-07-02 DIAGNOSIS — T86.819 COMPLICATION OF TRANSPLANTED LUNG, UNSPECIFIED COMPLICATION: ICD-10-CM

## 2020-07-02 PROCEDURE — 71250 CT THORAX DX C-: CPT | Mod: TC,PO

## 2020-07-02 PROCEDURE — 71250 CT CHEST WITHOUT CONTRAST: ICD-10-PCS | Mod: 26,,, | Performed by: RADIOLOGY

## 2020-07-02 PROCEDURE — 71250 CT THORAX DX C-: CPT | Mod: 26,,, | Performed by: RADIOLOGY

## 2020-07-03 ENCOUNTER — TELEPHONE (OUTPATIENT)
Dept: NEPHROLOGY | Facility: CLINIC | Age: 60
End: 2020-07-03

## 2020-07-03 DIAGNOSIS — N17.9 AKI (ACUTE KIDNEY INJURY): Primary | ICD-10-CM

## 2020-07-08 ENCOUNTER — LAB VISIT (OUTPATIENT)
Dept: LAB | Facility: HOSPITAL | Age: 60
End: 2020-07-08
Attending: INTERNAL MEDICINE
Payer: MEDICARE

## 2020-07-08 DIAGNOSIS — N17.9 AKI (ACUTE KIDNEY INJURY): ICD-10-CM

## 2020-07-08 LAB
ALBUMIN SERPL BCP-MCNC: 3 G/DL (ref 3.5–5.2)
ANION GAP SERPL CALC-SCNC: 11 MMOL/L (ref 8–16)
BUN SERPL-MCNC: 36 MG/DL (ref 6–20)
CALCIUM SERPL-MCNC: 9.7 MG/DL (ref 8.7–10.5)
CHLORIDE SERPL-SCNC: 103 MMOL/L (ref 95–110)
CO2 SERPL-SCNC: 24 MMOL/L (ref 23–29)
CREAT SERPL-MCNC: 3.1 MG/DL (ref 0.5–1.4)
EST. GFR  (AFRICAN AMERICAN): 24.5 ML/MIN/1.73 M^2
EST. GFR  (NON AFRICAN AMERICAN): 21.2 ML/MIN/1.73 M^2
GLUCOSE SERPL-MCNC: 163 MG/DL (ref 70–110)
PHOSPHATE SERPL-MCNC: 3.1 MG/DL (ref 2.7–4.5)
POTASSIUM SERPL-SCNC: 4.1 MMOL/L (ref 3.5–5.1)
SODIUM SERPL-SCNC: 138 MMOL/L (ref 136–145)

## 2020-07-08 PROCEDURE — 36415 COLL VENOUS BLD VENIPUNCTURE: CPT | Mod: PO

## 2020-07-08 PROCEDURE — 80069 RENAL FUNCTION PANEL: CPT

## 2020-07-10 ENCOUNTER — TELEPHONE (OUTPATIENT)
Dept: NEPHROLOGY | Facility: CLINIC | Age: 60
End: 2020-07-10

## 2020-07-10 NOTE — TELEPHONE ENCOUNTER
----- Message from Nadeem Peacock MD sent at 6/22/2020  2:45 PM CDT -----  Arrange Dr. Gomez to establish care

## 2020-07-16 ENCOUNTER — OFFICE VISIT (OUTPATIENT)
Dept: NEPHROLOGY | Facility: CLINIC | Age: 60
End: 2020-07-16
Payer: MEDICARE

## 2020-07-16 VITALS
OXYGEN SATURATION: 97 % | SYSTOLIC BLOOD PRESSURE: 146 MMHG | BODY MASS INDEX: 32.1 KG/M2 | HEIGHT: 71 IN | HEART RATE: 88 BPM | DIASTOLIC BLOOD PRESSURE: 92 MMHG | WEIGHT: 229.25 LBS

## 2020-07-16 DIAGNOSIS — E87.1 HYPONATREMIA: ICD-10-CM

## 2020-07-16 DIAGNOSIS — I48.0 PAROXYSMAL ATRIAL FIBRILLATION: ICD-10-CM

## 2020-07-16 DIAGNOSIS — D84.9 IMMUNOSUPPRESSION: ICD-10-CM

## 2020-07-16 DIAGNOSIS — E11.59 HYPERTENSION ASSOCIATED WITH DIABETES: Chronic | ICD-10-CM

## 2020-07-16 DIAGNOSIS — Z94.2 LUNG REPLACED BY TRANSPLANT: ICD-10-CM

## 2020-07-16 DIAGNOSIS — E78.00 PURE HYPERCHOLESTEROLEMIA: Chronic | ICD-10-CM

## 2020-07-16 DIAGNOSIS — I15.2 HYPERTENSION ASSOCIATED WITH DIABETES: Chronic | ICD-10-CM

## 2020-07-16 DIAGNOSIS — N18.4 CKD STAGE 4 DUE TO TYPE 2 DIABETES MELLITUS: Chronic | ICD-10-CM

## 2020-07-16 DIAGNOSIS — B25.9 CYTOMEGALOVIRUS (CMV) VIREMIA: ICD-10-CM

## 2020-07-16 DIAGNOSIS — E78.2 MIXED HYPERLIPIDEMIA: ICD-10-CM

## 2020-07-16 DIAGNOSIS — Z79.4 TYPE 2 DIABETES MELLITUS WITH HYPERGLYCEMIA, WITH LONG-TERM CURRENT USE OF INSULIN: ICD-10-CM

## 2020-07-16 DIAGNOSIS — Z94.2 LUNG TRANSPLANTED: ICD-10-CM

## 2020-07-16 DIAGNOSIS — E11.65 TYPE 2 DIABETES MELLITUS WITH HYPERGLYCEMIA, WITH LONG-TERM CURRENT USE OF INSULIN: ICD-10-CM

## 2020-07-16 DIAGNOSIS — T86.810 ACUTE REJECTION OF LUNG TRANSPLANT: ICD-10-CM

## 2020-07-16 DIAGNOSIS — B44.1 PULMONARY ASPERGILLOMA: ICD-10-CM

## 2020-07-16 DIAGNOSIS — D86.9 SARCOIDOSIS: ICD-10-CM

## 2020-07-16 DIAGNOSIS — D86.0 SARCOIDOSIS OF LUNG: ICD-10-CM

## 2020-07-16 DIAGNOSIS — E11.22 CKD STAGE 4 DUE TO TYPE 2 DIABETES MELLITUS: Chronic | ICD-10-CM

## 2020-07-16 DIAGNOSIS — I27.9 CHRONIC PULMONARY HEART DISEASE: ICD-10-CM

## 2020-07-16 DIAGNOSIS — N17.9 AKI (ACUTE KIDNEY INJURY): Primary | ICD-10-CM

## 2020-07-16 DIAGNOSIS — I27.29 PULMONARY HYPERTENSION ASSOCIATED WITH SARCOIDOSIS: ICD-10-CM

## 2020-07-16 DIAGNOSIS — Z87.19 HISTORY OF PANCREATITIS: ICD-10-CM

## 2020-07-16 DIAGNOSIS — K86.3 PSEUDOCYST OF PANCREAS: ICD-10-CM

## 2020-07-16 DIAGNOSIS — I10 ESSENTIAL HYPERTENSION: ICD-10-CM

## 2020-07-16 DIAGNOSIS — N18.30 CKD (CHRONIC KIDNEY DISEASE) STAGE 3, GFR 30-59 ML/MIN: ICD-10-CM

## 2020-07-16 DIAGNOSIS — D86.9 PULMONARY HYPERTENSION ASSOCIATED WITH SARCOIDOSIS: ICD-10-CM

## 2020-07-16 PROCEDURE — 99999 PR PBB SHADOW E&M-EST. PATIENT-LVL V: ICD-10-PCS | Mod: PBBFAC,,, | Performed by: INTERNAL MEDICINE

## 2020-07-16 PROCEDURE — 99214 OFFICE O/P EST MOD 30 MIN: CPT | Mod: S$PBB,,, | Performed by: INTERNAL MEDICINE

## 2020-07-16 PROCEDURE — 99214 PR OFFICE/OUTPT VISIT, EST, LEVL IV, 30-39 MIN: ICD-10-PCS | Mod: S$PBB,,, | Performed by: INTERNAL MEDICINE

## 2020-07-16 PROCEDURE — 99999 PR PBB SHADOW E&M-EST. PATIENT-LVL V: CPT | Mod: PBBFAC,,, | Performed by: INTERNAL MEDICINE

## 2020-07-16 PROCEDURE — 99215 OFFICE O/P EST HI 40 MIN: CPT | Mod: PBBFAC,PO | Performed by: INTERNAL MEDICINE

## 2020-07-16 RX ORDER — LOSARTAN POTASSIUM 50 MG/1
50 TABLET ORAL DAILY
Qty: 90 TABLET | Refills: 1 | Status: SHIPPED | OUTPATIENT
Start: 2020-07-16 | End: 2020-10-12

## 2020-07-16 NOTE — PATIENT INSTRUCTIONS
1..You have 10 grams of protein in the urine, or 10, 000 mg. Up to 200mg is normal  2. You have improving kidney injury, but slowly improving   3. Starting losartan, which will treat urine protein and your BP  4. Will monitor BP and kidney function, and please check for ankle swelling   5. Will check labs in 2 weeks   6. May need to do kidney biopsy if urine protein does not drastically improve

## 2020-07-16 NOTE — PROGRESS NOTES
Subjective:       Patient ID: Martin Hendrix Jr. is a 59 y.o. White male who presents for new evaluation of Chronic Kidney Disease    HPI     He is referred by Endocrine for AVILA on CKD  He has a significant history of lung transplant and post transplant DM, HTN. His baseline creatinine is 1.6-2mg/dL but most recent sCr was 3.2 but associated with a glucose of 600.   He denies foamy urine, no hematuria and no flank pain. He follows a somewhat low sodium diet (dines out frequently, lives alone) but feels he stays hydrated. He has developed LE edema since transplant when he was started on Norvasc. No SOB nor orthopnea. No NSAID use and no herbal medications. No known kidney disease in his family     Interval history July 2020: Still no significant increase in LE edema. BP running the same, 140's, low 150's. He feels well.     Review of Systems   Constitutional: Negative for activity change, appetite change, fatigue and unexpected weight change.   HENT: Negative for facial swelling.    Eyes: Negative for visual disturbance.   Respiratory: Negative for cough and shortness of breath.    Cardiovascular: Negative for chest pain and leg swelling.   Gastrointestinal: Negative for abdominal pain.   Genitourinary: Negative for difficulty urinating, dysuria, frequency, hematuria and urgency.   Musculoskeletal: Negative for arthralgias.   Allergic/Immunologic: Positive for immunocompromised state.   Neurological: Negative for weakness and headaches.   Hematological: Does not bruise/bleed easily.   Psychiatric/Behavioral: Negative for confusion and decreased concentration.       Objective:      Physical Exam  Vitals signs and nursing note reviewed.   Constitutional:       General: He is not in acute distress.     Appearance: Normal appearance. He is not ill-appearing.   HENT:      Head: Atraumatic.      Mouth/Throat:      Mouth: Mucous membranes are moist.   Eyes:      General: No scleral icterus.     Conjunctiva/sclera:  Conjunctivae normal.   Neck:      Vascular: No JVD.   Cardiovascular:      Rate and Rhythm: Normal rate.      Heart sounds: S1 normal and S2 normal. No murmur.   Pulmonary:      Breath sounds: No wheezing or rales.   Abdominal:      Palpations: Abdomen is soft.   Musculoskeletal:      Right lower leg: Edema present.      Left lower leg: Edema present.   Skin:     General: Skin is warm and dry.      Coloration: Skin is not jaundiced.   Neurological:      General: No focal deficit present.      Mental Status: He is oriented to person, place, and time.   Psychiatric:         Mood and Affect: Mood normal.         Behavior: Behavior is cooperative.         Thought Content: Thought content normal.         Assessment:       1. AVILA (acute kidney injury)    2. CKD (chronic kidney disease) stage 3, GFR 30-59 ml/min    3. Type 2 diabetes mellitus with hyperglycemia, with long-term current use of insulin    4. 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 Acute rejection of lung transplant    6. Paroxysmal atrial fibrillation    7. Essential hypertension    8. Hyponatremia    9. Mixed hyperlipidemia    10. Immunosuppression    11. Sarcoidosis of lung    12. Pseudocyst of pancreas    13. History of pancreatitis    14. CKD stage 4 due to type 2 diabetes mellitus    15. Sarcoidosis    16. Cytomegalovirus (CMV) viremia    17. Pulmonary aspergilloma    18. Hypertension associated with diabetes    19. Pure hypercholesterolemia    20. Lung transplanted    21. Pulmonary hypertension associated with sarcoidosis    22. Chronic pulmonary heart disease        Plan:           Acute on CKD  - from ATN although the exact insult is difficult to pinpoint minus the 10 grams of proteinuria  - discussed kidney biopsy at length, he is a little reluctant (and has renal cortical thinning)   - his nephrotic range proteinuria may be falsely high given his HTN  - will add losartan and re-measure protein and chemistries, realizing his sCr may increase a little from  adding the ARB    HTN  - will eventually stop Norvasc to its contribution to edema     Mineral and Bone Disease  - add D3 2000u, stop calcium with D3    DM  - per Endocrine       Labs in 2 weeks

## 2020-07-17 NOTE — PROGRESS NOTES
Subjective:      Patient ID: Martin Hendrix Jr. is a 59 y.o. male.    Chief Complaint:  Diabetes    History of Present Illness  Martin Hendrix Jr. presents today for follow up of type 2 DM.    The patient was diagnosed with Type 2 diabetes in ~2017 on labs.     At his last visit I ordered labs and his BG was 543. I weight based dosed him as well.    Ref. Range 6/1/2020 07:48 7/1/2020 08:38 7/1/2020 08:38 7/2/2020 14:13 7/8/2020 14:00   Glucose Latest Ref Range: 70 - 110 mg/dL 593 (HH) 143 (H) 143 (H) 303 (H) 163 (H)   Hemoglobin A1C External Latest Ref Range: 4.0 - 5.6 % >14.0 (H)       Estimated Avg Glucose Latest Ref Range: 68 - 131 mg/dL Unable to calculate         Living back home in [place].    Double lung transplant in 8/2017      DIABETES COMPLICATIONS: nephropathy (micro x1)     DM MEDICATIONS USED IN THE PAST: Metformin, Glipizide, MDI while in hospital      CURRENT DIABETES MEDICATIONS: Humalog 8 units AC plus correction scale (150 +2), Tresiba 26 units nightly     BLOOD GLUCOSE MONITORING:    Checking BG 0-1x daily.  140, 190, this  lowest he has seen is 130      HYPOGLYCEMIA:  No  Knows how to correct with 15 grams of carbs- juice, coke, or glucose tablets.     MEALS:   Eating 2-3 meals per day   Snacks on fruit, chips  Water + lemon, rare juice    Diabetes Management Status  Statin: Taking  ACE/ARB: Not taking  Screening or Prevention Patient's value Goal Complete/Controlled?   HgA1C Testing and Control   Lab Results   Component Value Date    HGBA1C >14.0 (H) 06/01/2020    Annually/Less than 8% No   Lipid profile : 06/01/2020 Annually Yes   LDL control Lab Results   Component Value Date    LDLCALC Invalid, Trig>400.0 06/01/2020    Annually/Less than 100 mg/dl  No   Nephropathy screening Lab Results   Component Value Date    LABMICR 2964.0 06/01/2020     Lab Results   Component Value Date    PROTEINUA 3+ (A) 07/02/2020    Annually Yes   Blood pressure BP Readings from Last 1 Encounters:  "  07/20/20 (!) 160/99    Less than 140/90 No   Dilated retinal exam : 12/13/2017 Annually No   Foot exam   : 11/15/2017 Annually No     + pancreatitis, hospitalized (~2009), denies gastroparesis.    Feels as if neuropathy has worsened.     Review of Systems   Constitutional: Negative for fatigue.   Eyes: Negative for visual disturbance.   Respiratory: Negative for shortness of breath.    Cardiovascular: Negative for chest pain.   Gastrointestinal: Negative for abdominal pain.   Musculoskeletal: Negative for arthralgias.   Skin: Negative for wound.   Neurological: Negative for headaches.   Hematological: Does not bruise/bleed easily.   Psychiatric/Behavioral: Negative for sleep disturbance.       Objective:   Physical Exam  Vitals signs reviewed.   Constitutional:       Appearance: He is well-developed.   Neck:      Thyroid: No thyromegaly.   Cardiovascular:      Rate and Rhythm: Normal rate.   Pulmonary:      Effort: Pulmonary effort is normal.   Abdominal:      Palpations: Abdomen is soft.     Appropriate footwear, Foot exam deferred per patient.   No ulcers or wounds.   injection sites are ok. No lipo hypertropthy or atrophy      Visit Vitals  BP (!) 160/99   Pulse 90   Ht 5' 11" (1.803 m)   Wt 106 kg (233 lb 11 oz)   BMI 32.59 kg/m²      Lab Review:   Lab Results   Component Value Date    HGBA1C >14.0 (H) 06/01/2020    HGBA1C 11.5 (H) 09/16/2019    HGBA1C 10.9 (H) 02/12/2019      Lab Results   Component Value Date    CHOL 307 (H) 06/01/2020    HDL 46 06/01/2020    LDLCALC Invalid, Trig>400.0 06/01/2020    TRIG 440 (H) 06/01/2020    CHOLHDL 15.0 (L) 06/01/2020     Lab Results   Component Value Date     07/08/2020    K 4.1 07/08/2020     07/08/2020    CO2 24 07/08/2020     (H) 07/08/2020    BUN 36 (H) 07/08/2020    CREATININE 3.1 (H) 07/08/2020    CALCIUM 9.7 07/08/2020    PROT 7.0 07/01/2020    ALBUMIN 3.0 (L) 07/08/2020    BILITOT 0.3 07/01/2020    ALKPHOS 88 07/01/2020    AST 15 07/01/2020    " ALT 14 07/01/2020    ANIONGAP 11 07/08/2020    ESTGFRAFRICA 24.5 (A) 07/08/2020    EGFRNONAA 21.2 (A) 07/08/2020    TSH 1.092 04/24/2017     Vit D, 25-Hydroxy   Date Value Ref Range Status   07/01/2020 14 (L) 30 - 96 ng/mL Final     Comment:     Vitamin D deficiency.........<10 ng/mL                              Vitamin D insufficiency......10-29 ng/mL       Vitamin D sufficiency........> or equal to 30 ng/mL  Vitamin D toxicity............>100 ng/mL       Assessment and Plan     1. Type 2 diabetes mellitus with hyperglycemia, with long-term current use of insulin  Hemoglobin A1C    Comprehensive metabolic panel   2. Lung transplanted     3. Mixed hyperlipidemia     4. Essential hypertension         Type 2 diabetes mellitus with hyperglycemia, with long-term current use of insulin  -- A1c goal 6.5-7% due to age & co morbidities, BG have improved   -- Medications discussed:  MFM decreased GFR, SE in the past  GLP1-DPP4 + pancreatitis   [place]- on MDI, unsafe   SGLT2 - may be an option in the future if GFR increases   -- Reviewed logs/CGM:  BG are consistently above 200.     -- Medication Changes: Increase by 20% Tresiba 32u HS & Humalog 12u AC + /25     -- Reviewed goals of therapy are to get the best control we can without hypoglycemia.  -- Reviewed patient's current insulin regimen. Clarified proper insulin dose and timing in relation to meals, etc. Insulin injection sites and proper rotation instructed.    -- Advised frequent self blood glucose monitoring.  Patient encouraged to document glucose results and bring them to every clinic visit.  -- Hypoglycemia precautions discussed. Instructed on precautions before driving.    -- Call for Bg repeatedly < 90 or > 180.   -- Close adherence to lifestyle changes recommended.   -- Periodic follow ups for eye evaluations, foot care and dental care suggested.    Lung transplanted  Optimize BG control   -- Continue following with LTS    Mixed hyperlipidemia  On statin per  ADA recommendations    Essential hypertension  -- Elevated at today's visit  -- Patient admits to not taking BP medication this AM.  -- Encouraged patient to take BP medication every morning and to monitor BP at home.  Notify PCP is sustaining >130/80.  -- patient states he will go straight to his truck and get his BP medication   -- Blood pressure goals discussed with patient    Follow up in about 3 months (around 10/20/2020).  Labs prior to next appointment

## 2020-07-20 ENCOUNTER — OFFICE VISIT (OUTPATIENT)
Dept: ENDOCRINOLOGY | Facility: CLINIC | Age: 60
End: 2020-07-20
Payer: MEDICARE

## 2020-07-20 VITALS
HEIGHT: 71 IN | SYSTOLIC BLOOD PRESSURE: 160 MMHG | HEART RATE: 90 BPM | WEIGHT: 233.69 LBS | DIASTOLIC BLOOD PRESSURE: 99 MMHG | BODY MASS INDEX: 32.72 KG/M2

## 2020-07-20 DIAGNOSIS — Z79.4 TYPE 2 DIABETES MELLITUS WITH HYPERGLYCEMIA, WITH LONG-TERM CURRENT USE OF INSULIN: Primary | Chronic | ICD-10-CM

## 2020-07-20 DIAGNOSIS — E78.2 MIXED HYPERLIPIDEMIA: ICD-10-CM

## 2020-07-20 DIAGNOSIS — I10 ESSENTIAL HYPERTENSION: ICD-10-CM

## 2020-07-20 DIAGNOSIS — E11.65 TYPE 2 DIABETES MELLITUS WITH HYPERGLYCEMIA, WITH LONG-TERM CURRENT USE OF INSULIN: Primary | Chronic | ICD-10-CM

## 2020-07-20 DIAGNOSIS — Z94.2 LUNG TRANSPLANTED: ICD-10-CM

## 2020-07-20 PROBLEM — T86.810 ACUTE REJECTION OF LUNG TRANSPLANT: Status: RESOLVED | Noted: 2017-11-03 | Resolved: 2020-07-20

## 2020-07-20 PROBLEM — I82.409 ACUTE DEEP VEIN THROMBOSIS (DVT): Status: RESOLVED | Noted: 2019-03-04 | Resolved: 2020-07-20

## 2020-07-20 PROBLEM — Z79.899 ENCOUNTER FOR LONG-TERM (CURRENT) USE OF MEDICATIONS: Status: ACTIVE | Noted: 2020-07-20

## 2020-07-20 PROBLEM — J22 LRTI (LOWER RESPIRATORY TRACT INFECTION): Status: RESOLVED | Noted: 2017-11-03 | Resolved: 2020-07-20

## 2020-07-20 PROBLEM — N17.9 ACUTE KIDNEY INJURY: Status: RESOLVED | Noted: 2019-03-03 | Resolved: 2020-07-20

## 2020-07-20 PROCEDURE — 99999 PR PBB SHADOW E&M-EST. PATIENT-LVL IV: CPT | Mod: PBBFAC,,, | Performed by: NURSE PRACTITIONER

## 2020-07-20 PROCEDURE — 99999 PR PBB SHADOW E&M-EST. PATIENT-LVL IV: ICD-10-PCS | Mod: PBBFAC,,, | Performed by: NURSE PRACTITIONER

## 2020-07-20 PROCEDURE — 99214 OFFICE O/P EST MOD 30 MIN: CPT | Mod: S$PBB,,, | Performed by: NURSE PRACTITIONER

## 2020-07-20 PROCEDURE — 99214 OFFICE O/P EST MOD 30 MIN: CPT | Mod: PBBFAC | Performed by: NURSE PRACTITIONER

## 2020-07-20 PROCEDURE — 99214 PR OFFICE/OUTPT VISIT, EST, LEVL IV, 30-39 MIN: ICD-10-PCS | Mod: S$PBB,,, | Performed by: NURSE PRACTITIONER

## 2020-07-20 NOTE — ASSESSMENT & PLAN NOTE
-- A1c goal 6.5-7% due to age & co morbidities, BG have improved   -- Medications discussed:  MFM decreased GFR, SE in the past  GLP1-DPP4 + pancreatitis   [place]- on MDI, unsafe   SGLT2 - may be an option in the future if GFR increases   -- Reviewed logs/CGM:  BG are consistently above 200.     -- Medication Changes: Increase by 20% Tresiba 32u HS & Humalog 12u AC + /25     -- Reviewed goals of therapy are to get the best control we can without hypoglycemia.  -- Reviewed patient's current insulin regimen. Clarified proper insulin dose and timing in relation to meals, etc. Insulin injection sites and proper rotation instructed.    -- Advised frequent self blood glucose monitoring.  Patient encouraged to document glucose results and bring them to every clinic visit.  -- Hypoglycemia precautions discussed. Instructed on precautions before driving.    -- Call for Bg repeatedly < 90 or > 180.   -- Close adherence to lifestyle changes recommended.   -- Periodic follow ups for eye evaluations, foot care and dental care suggested.

## 2020-07-20 NOTE — ASSESSMENT & PLAN NOTE
-- Elevated at today's visit  -- Patient admits to not taking BP medication this AM.  -- Encouraged patient to take BP medication every morning and to monitor BP at home.  Notify PCP is sustaining >130/80.  -- patient states he will go straight to his truck and get his BP medication   -- Blood pressure goals discussed with patient

## 2020-07-20 NOTE — PATIENT INSTRUCTIONS
Follow up in about 3 months (around 10/21/2020), or if symptoms worsen or fail to improve, for Med refills, LAB RESULTS.     If no improvement in symptoms or symptoms worsen, please be advised to call MD, follow-up at clinic and/or go to ER if becomes severe.    Michel Henley M.D.        We Offer TELEHEALTH & Same Day Appointments!   Book your Telehealth appointment with me through my nurse or   Clinic appointments on [facility]!    [address] [place] [address]    Office: [phone]   FAX: [phone]    Check out my Facebook Page and Follow Me at: [email or web address]    Check out my website at [email or web address] by clicking on: [email or web address]    To Schedule appointments online, go to [facility]: [email or web address]

## 2020-07-21 ENCOUNTER — OFFICE VISIT (OUTPATIENT)
Dept: FAMILY MEDICINE | Facility: CLINIC | Age: 60
End: 2020-07-21
Payer: MEDICARE

## 2020-07-21 VITALS
DIASTOLIC BLOOD PRESSURE: 88 MMHG | HEIGHT: 71 IN | HEART RATE: 88 BPM | TEMPERATURE: 98 F | SYSTOLIC BLOOD PRESSURE: 146 MMHG | BODY MASS INDEX: 32.9 KG/M2 | WEIGHT: 235 LBS

## 2020-07-21 DIAGNOSIS — N18.4 CKD STAGE 4 DUE TO TYPE 2 DIABETES MELLITUS: ICD-10-CM

## 2020-07-21 DIAGNOSIS — E11.22 CKD STAGE 4 DUE TO TYPE 2 DIABETES MELLITUS: ICD-10-CM

## 2020-07-21 DIAGNOSIS — E78.2 COMBINED HYPERLIPIDEMIA ASSOCIATED WITH TYPE 2 DIABETES MELLITUS: Primary | ICD-10-CM

## 2020-07-21 DIAGNOSIS — Z79.899 ENCOUNTER FOR LONG-TERM (CURRENT) USE OF MEDICATIONS: ICD-10-CM

## 2020-07-21 DIAGNOSIS — I15.2 HYPERTENSION ASSOCIATED WITH DIABETES: ICD-10-CM

## 2020-07-21 DIAGNOSIS — Z13.220 ENCOUNTER FOR LIPID SCREENING FOR CARDIOVASCULAR DISEASE: ICD-10-CM

## 2020-07-21 DIAGNOSIS — Z00.01 ENCOUNTER FOR GENERAL ADULT MEDICAL EXAMINATION WITH ABNORMAL FINDINGS: ICD-10-CM

## 2020-07-21 DIAGNOSIS — Z79.4 TYPE 2 DIABETES MELLITUS WITH HYPERGLYCEMIA, WITH LONG-TERM CURRENT USE OF INSULIN: ICD-10-CM

## 2020-07-21 DIAGNOSIS — Z12.5 ENCOUNTER FOR PROSTATE CANCER SCREENING: ICD-10-CM

## 2020-07-21 DIAGNOSIS — E11.65 TYPE 2 DIABETES MELLITUS WITH HYPERGLYCEMIA, WITH LONG-TERM CURRENT USE OF INSULIN: ICD-10-CM

## 2020-07-21 DIAGNOSIS — E11.59 HYPERTENSION ASSOCIATED WITH DIABETES: ICD-10-CM

## 2020-07-21 DIAGNOSIS — Z13.6 ENCOUNTER FOR LIPID SCREENING FOR CARDIOVASCULAR DISEASE: ICD-10-CM

## 2020-07-21 DIAGNOSIS — E11.69 COMBINED HYPERLIPIDEMIA ASSOCIATED WITH TYPE 2 DIABETES MELLITUS: Primary | ICD-10-CM

## 2020-07-21 PROCEDURE — 99999 PR PBB SHADOW E&M-EST. PATIENT-LVL IV: CPT | Mod: PBBFAC,,, | Performed by: FAMILY MEDICINE

## 2020-07-21 PROCEDURE — 99204 OFFICE O/P NEW MOD 45 MIN: CPT | Mod: S$PBB,,, | Performed by: FAMILY MEDICINE

## 2020-07-21 PROCEDURE — 99204 PR OFFICE/OUTPT VISIT, NEW, LEVL IV, 45-59 MIN: ICD-10-PCS | Mod: S$PBB,,, | Performed by: FAMILY MEDICINE

## 2020-07-21 PROCEDURE — 99999 PR PBB SHADOW E&M-EST. PATIENT-LVL IV: ICD-10-PCS | Mod: PBBFAC,,, | Performed by: FAMILY MEDICINE

## 2020-07-21 PROCEDURE — 99214 OFFICE O/P EST MOD 30 MIN: CPT | Mod: PBBFAC,PO | Performed by: FAMILY MEDICINE

## 2020-07-21 RX ORDER — INSULIN LISPRO 100 [IU]/ML
12 INJECTION, SOLUTION INTRAVENOUS; SUBCUTANEOUS
Qty: 15 ML | Refills: 11 | Status: SHIPPED | OUTPATIENT
Start: 2020-07-21 | End: 2020-12-14

## 2020-07-21 RX ORDER — PRAVASTATIN SODIUM 20 MG/1
20 TABLET ORAL DAILY
Qty: 90 TABLET | Refills: 3 | Status: ON HOLD | OUTPATIENT
Start: 2020-07-21 | End: 2021-01-22 | Stop reason: HOSPADM

## 2020-07-21 RX ORDER — INSULIN DEGLUDEC 200 U/ML
32 INJECTION, SOLUTION SUBCUTANEOUS NIGHTLY
Qty: 15 ML | Refills: 11 | Status: SHIPPED | OUTPATIENT
Start: 2020-07-21 | End: 2020-12-14

## 2020-07-22 NOTE — PROGRESS NOTES
PLAN:      Problem List Items Addressed This Visit     Type 2 diabetes mellitus with hyperglycemia, with long-term current use of insulin (Chronic)     Patient with multiple uncontrolled diabetic metrics at this time.  Need better blood pressure control and glycemic control.  Patient previously seeing Endocrinology through [facility].    Medication changes.  Update labs.Monitor hemoglobin A1c.  Discussed diabetic diet and exercise protocol.  Continue medications.  Monitor for side effects.  Discussed checking blood glucose.  Discussed symptoms to monitor for and to notify me immediately if persistent or worsening.  Follow up with Ophthalmology/Optometry and Podiatry.           Relevant Medications    pravastatin (PRAVACHOL) 20 MG tablet    insulin lispro (HUMALOG KWIKPEN INSULIN) 100 unit/mL pen    insulin degludec (TRESIBA FLEXTOUCH U-200) 200 unit/mL (3 mL) InPn    Other Relevant Orders    CBC Without Differential    TSH    Comprehensive metabolic panel    Hemoglobin A1C    Lipid Panel    Hypertension associated with diabetes (Chronic)     Patient is intermittently noncompliant with his medications.  Discussed hypertension disease course, DASH-diet and exercise.  Discussed medication regimen importance of treating high blood pressure.  Patient advised of risk of untreated blood pressure.    ER precautions were given for symptoms of hypertensive urgency and emergency.           Relevant Medications    pravastatin (PRAVACHOL) 20 MG tablet    insulin lispro (HUMALOG KWIKPEN INSULIN) 100 unit/mL pen    insulin degludec (TRESIBA FLEXTOUCH U-200) 200 unit/mL (3 mL) InPn    Other Relevant Orders    CBC Without Differential    TSH    Comprehensive metabolic panel    Hemoglobin A1C    Lipid Panel    CKD stage 4 due to type 2 diabetes mellitus (Chronic)     Monitor renal function.  Avoid anti-inflammatory medications.         Relevant Medications    insulin lispro (HUMALOG KWIKPEN INSULIN) 100 unit/mL pen    insulin degludec  (TRESIBA FLEXTOUCH U-200) 200 unit/mL (3 mL) InPn    Other Relevant Orders    CBC Without Differential    TSH    Comprehensive metabolic panel    Hemoglobin A1C    Lipid Panel    Encounter for long-term (current) use of medications (Chronic)     Complete history and physical was completed today.  Complete and thorough medication reconciliation was performed.  Discussed risks and benefits of medications.  Advised patient on orders and health maintenance.  We discussed old records and old labs if available.  Will request any records not available through epic.  Continue current medications listed on your summary sheet.           Relevant Orders    CBC Without Differential    TSH    Comprehensive metabolic panel    Hemoglobin A1C    Lipid Panel    Combined hyperlipidemia associated with type 2 diabetes mellitus - Primary (Chronic)     Update fasting lipid panel.  Continue statin.Discussed hyperlipidemia disease course, healthy diet and increased need for exercise.  Discussed the risk of cardiovascular disease, increase stroke and heart attack risk.    Patient voiced understanding and understood the treatment plan. All questions were answered.     Patient is having cramps but has been on statin prior to having these muscle aches.  Mainly in the hands and calves.         Relevant Medications    pravastatin (PRAVACHOL) 20 MG tablet    insulin lispro (HUMALOG KWIKPEN INSULIN) 100 unit/mL pen    insulin degludec (TRESIBA FLEXTOUCH U-200) 200 unit/mL (3 mL) InPn    Other Relevant Orders    CBC Without Differential    TSH    Comprehensive metabolic panel    Hemoglobin A1C    Lipid Panel      Other Visit Diagnoses     Encounter for general adult medical examination with abnormal findings        Relevant Orders    CBC Without Differential    TSH    Comprehensive metabolic panel    Hemoglobin A1C    Lipid Panel    Encounter for lipid screening for cardiovascular disease        Relevant Orders    CBC Without Differential    TSH  "   Comprehensive metabolic panel    Hemoglobin A1C    Lipid Panel    Encounter for prostate cancer screening        Relevant Orders    PSA, Screening        Future Appointments     Date Provider Specialty Appt Notes    10/13/2020  Lab 3 mo labs / moreno/ christa     10/20/2020 Sybil Swanson NP Endocrinology 3 mo f/up p/ chidi     10/22/2020 Michel Henley MD Family Medicine Lab f/u           Medication List with Changes/Refills   Current Medications    AMLODIPINE (NORVASC) 5 MG TABLET    TAKE 1 TABLET(5 MG) BY MOUTH EVERY DAY    CALCIUM-VITAMIN D TABLET 600 MG-200 UNITS    Take 1 tablet by mouth 2 (two) times daily.    ECOTRIN LOW STRENGTH 81 MG EC TABLET    TAKE 1 TABLET DAILY    FAMOTIDINE (PEPCID) 20 MG TABLET    TAKE 1 TABLET TWICE A DAY    FOLIC ACID (FOLVITE) 1 MG TABLET    TAKE 1 TABLET DAILY    FUROSEMIDE (LASIX) 40 MG TABLET    Take 1 tablet (40 mg total) by mouth daily as needed (leg swelling).    LANCETS MISC    To check BG 4 times daily, to use with insurance preferred meter    LOSARTAN (COZAAR) 50 MG TABLET    Take 1 tablet (50 mg total) by mouth once daily. For BP and urine protein    MAGNESIUM CHLORIDE (MAG 64) 64 MG TBEC    Take 2 tablets (128 mg total) by mouth 2 (two) times daily.    ONETOUCH VERIO STRP    USE TO CHECK BLOOD GLUCOSE FOUR TIMES A DAY, TO USE WITH INSURANCE PREFERRED METER    PEN NEEDLE, DIABETIC (BD ULTRA-FINE JIM PEN NEEDLE) 32 GAUGE X 5/32" NDLE    Uses 4 times daily, on multiple daily insulin injections    PREDNISONE (DELTASONE) 5 MG TABLET    TAKE 1 TABLET(5 MG) BY MOUTH EVERY DAY    SULFAMETHOXAZOLE-TRIMETHOPRIM 400-80MG (BACTRIM,SEPTRA) 400-80 MG PER TABLET    Take 1 tablet by mouth every Mon, Wed, Fri.    TACROLIMUS (PROGRAF) 0.5 MG CAP    Take 3 capsules (1.5 mg total) by mouth every 12 (twelve) hours.   Changed and/or Refilled Medications    Modified Medication Previous Medication    INSULIN DEGLUDEC (TRESIBA FLEXTOUCH U-200) 200 UNIT/ML (3 ML) INPN insulin degludec " (TRESIBA FLEXTOUCH U-200) 200 unit/mL (3 mL) InPn       Inject 32 Units into the skin every evening.    Inject 26 Units into the skin every evening.    INSULIN LISPRO (HUMALOG KWIKPEN INSULIN) 100 UNIT/ML PEN insulin lispro (HUMALOG KWIKPEN INSULIN) 100 unit/mL pen       Inject 12 Units into the skin 3 (three) times daily before meals. Plus correction scale TDD: 60    Inject 8 Units into the skin 3 (three) times daily before meals. Plus correction scale TDD: 60    PRAVASTATIN (PRAVACHOL) 20 MG TABLET pravastatin (PRAVACHOL) 20 MG tablet       Take 1 tablet (20 mg total) by mouth once daily.    TAKE 1 TABLET BY MOUTH EVERY DAY   Discontinued Medications    FLUTICASONE (FLONASE) 50 MCG/ACTUATION NASAL SPRAY    1 spray by Each Nare route once daily.       Michel Henley M.D.     ==========================================================================  Subjective:      Patient ID: Martin Hendrix Jr. is a 59 y.o. male.  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     Chief Complaint: Establish Care (cramps in legs, hands and feet)      Problem List Items Addressed This Visit     Type 2 diabetes mellitus with hyperglycemia, with long-term current use of insulin (Chronic)    Overview     Diabetes Management Status    Statin: Taking  ACE/ARB: Taking    Screening or Prevention Patient's value Goal Complete/Controlled?   HgA1C Testing and Control   Lab Results   Component Value Date    HGBA1C >14.0 (H) 06/01/2020      Annually/Less than 8% Yes   Lipid profile : 06/01/2020 Annually Yes   LDL control Lab Results   Component Value Date     LDLCALC Invalid, Trig>400.0 06/01/2020    Annually/Less than 100 mg/dl  Yes   Nephropathy screening Lab Results   Component Value Date    LABMICR 2964.0 06/01/2020     Lab Results   Component Value Date    PROTEINUA 3+ (A) 07/02/2020    Annually Yes   Blood pressure BP Readings from Last 1 Encounters:   07/21/20 (!) 146/88    Less than 140/90 No   Dilated retinal exam : 12/13/2017 Annually No   Foot exam   : 11/15/2017 Annually No              Current Assessment & Plan     Patient with multiple uncontrolled diabetic metrics at this time.  Need better blood pressure control and glycemic control.  Patient previously seeing Endocrinology through [facility].    Medication changes.  Update labs.Monitor hemoglobin A1c.  Discussed diabetic diet and exercise protocol.  Continue medications.  Monitor for side effects.  Discussed checking blood glucose.  Discussed symptoms to monitor for and to notify me immediately if persistent or worsening.  Follow up with Ophthalmology/Optometry and Podiatry.           Hypertension associated with diabetes (Chronic)    Overview     CHRONIC.  Uncontrolled. BP Reviewed.  Compliant with BP medications. No SE reported.   (-) CP, SOB, palpitations, dizziness, lightheadedness, HA, arm numbness, tingling or weakness, syncope.  Creatinine   Date Value Ref Range Status   07/08/2020 3.1 (H) 0.5 - 1.4 mg/dL Final              Current Assessment & Plan     Patient is intermittently noncompliant with his medications.  Discussed hypertension disease course, DASH-diet and exercise.  Discussed medication regimen importance of treating high blood pressure.  Patient advised of risk of untreated blood pressure.    ER precautions were given for symptoms of hypertensive urgency and emergency.           CKD stage 4 due to type 2 diabetes mellitus (Chronic)    Overview     Chronic.  Control is uncertain.         Current Assessment & Plan     Monitor renal function.  Avoid anti-inflammatory medications.          Encounter for long-term (current) use of medications (Chronic)    Overview     CHRONIC. Stable. Compliant with medications for managed conditions. See medication list. No SE reported.   Routine lab analysis is being monitored. Refills were addressed.  Lab Results   Component Value Date    WBC 10.14 07/01/2020    HGB 11.3 (L) 07/01/2020    HCT 36.5 (L) 07/01/2020    MCV 93 07/01/2020     07/01/2020         Chemistry        Component Value Date/Time     07/08/2020 1400    K 4.1 07/08/2020 1400     07/08/2020 1400    CO2 24 07/08/2020 1400    BUN 36 (H) 07/08/2020 1400    CREATININE 3.1 (H) 07/08/2020 1400     (H) 07/08/2020 1400        Component Value Date/Time    CALCIUM 9.7 07/08/2020 1400    ALKPHOS 88 07/01/2020 0838    AST 15 07/01/2020 0838    ALT 14 07/01/2020 0838    BILITOT 0.3 07/01/2020 0838    ESTGFRAFRICA 24.5 (A) 07/08/2020 1400    EGFRNONAA 21.2 (A) 07/08/2020 1400          Lab Results   Component Value Date    TSH 1.092 04/24/2017    Z0GLFJU 108 04/24/2017    M0ICXWJ 6.9 04/24/2017    FREET4 1.06 04/24/2017    T3FREE 3.1 04/24/2017              Current Assessment & Plan     Complete history and physical was completed today.  Complete and thorough medication reconciliation was performed.  Discussed risks and benefits of medications.  Advised patient on orders and health maintenance.  We discussed old records and old labs if available.  Will request any records not available through epic.  Continue current medications listed on your summary sheet.           Combined hyperlipidemia associated with type 2 diabetes mellitus - Primary (Chronic)    Overview     CHRONIC.  Uncontrolled on recent lab draw however he was not fast. Lab analysis reviewed.   (-) CP, SOB, abdominal pain, N/V/D, constipation, jaundice, skin changes.  (+) Myalgias  Lab Results   Component Value Date    CHOL 307 (H) 06/01/2020    CHOL 225 (H) 09/16/2019    CHOL 203 (H) 02/19/2018     Lab Results   Component  Value Date    HDL 46 06/01/2020    HDL 45 09/16/2019    HDL 52 02/19/2018     Lab Results   Component Value Date    LDLCALC Invalid, Trig>400.0 06/01/2020    LDLCALC 130.4 09/16/2019    LDLCALC 129.4 02/19/2018     Lab Results   Component Value Date    TRIG 440 (H) 06/01/2020    TRIG 248 (H) 09/16/2019    TRIG 108 02/19/2018     Lab Results   Component Value Date    CHOLHDL 15.0 (L) 06/01/2020    CHOLHDL 20.0 09/16/2019    CHOLHDL 25.6 02/19/2018     Lab Results   Component Value Date    TOTALCHOLEST 6.7 (H) 06/01/2020    TOTALCHOLEST 5.0 09/16/2019    TOTALCHOLEST 3.9 02/19/2018     Lab Results   Component Value Date    ALT 14 07/01/2020    AST 15 07/01/2020    ALKPHOS 88 07/01/2020    BILITOT 0.3 07/01/2020     ======================================================           Current Assessment & Plan     Update fasting lipid panel.  Continue statin.Discussed hyperlipidemia disease course, healthy diet and increased need for exercise.  Discussed the risk of cardiovascular disease, increase stroke and heart attack risk.    Patient voiced understanding and understood the treatment plan. All questions were answered.     Patient is having cramps but has been on statin prior to having these muscle aches.  Mainly in the hands and calves.           Other Visit Diagnoses     Encounter for general adult medical examination with abnormal findings        Encounter for lipid screening for cardiovascular disease        Encounter for prostate cancer screening               Past Medical History:  Past Medical History:   Diagnosis Date    Acute rejection of lung transplant 11/3/2017    AVILA (acute kidney injury) 8/27/2017    Atrial fibrillation 8/23/2017    CKD (chronic kidney disease) stage 3, GFR 30-59 ml/min     Dr. Peacock    Hypertension     On home oxygen therapy     KRISTYN on CPAP     Osteopenia     Pancreatitis 2009    hospitalized    Pulmonary hypertension     Pure hypercholesterolemia 11/15/2017    Sarcoidosis      Shortness of breath     Type 2 diabetes mellitus 11/5/2017     Past Surgical History:   Procedure Laterality Date    ABDOMINAL SURGERY      6 weeks old    BRONCHOSCOPY      BRONCHOSCOPY N/A 8/22/2018    Procedure: flexible bronchoscopy with tissue biopsy CPT 14514;  Surgeon: [facility];  [facility];  Service: Endoscopy;  Laterality: N/A;    BRONCHOSCOPY N/A 11/20/2018    Procedure: flexible bronchoscopy with tissue biopy CPT 46358;  Surgeon: [facility] Provider;  [facility];  Service: Anesthesiology;  Laterality: N/A;    CHEST TUBE INSERTION      CHOLECYSTECTOMY      COLONOSCOPY      ESOPHAGOGASTRODUODENOSCOPY N/A 8/6/2018    Procedure: EGD (ESOPHAGOGASTRODUODENOSCOPY);  Surgeon: Ramu Eisenberg MD;  Location: [facility] (2ND FLR);  Service: Endoscopy;  Laterality: N/A;  off pepcid and all acid reducers for 2 weeks; PA > 50    INSERTION OF TUNNELED CENTRAL VENOUS HEMODIALYSIS CATHETER N/A 3/13/2019    Procedure: INSERTION, CATHETER, CENTRAL VENOUS, HEMODIALYSIS, TUNNELED;  Surgeon: Edy Gonzalez MD;  Location: [facility] CATH LAB;  Service: Nephrology;  Laterality: N/A;    LUNG BIOPSY      LUNG TRANSPLANT  08/2017    REMOVAL OF TUNNELED CENTRAL VENOUS CATHETER (CVC) N/A 5/16/2019    Procedure: REMOVAL, CATHETER, CENTRAL VENOUS, TUNNELED;  Surgeon: Edy Gonzalez MD;  Location: [facility] CATH LAB;  Service: Nephrology;  Laterality: N/A;    TONSILLECTOMY       Review of patient's allergies indicates:  No Known Allergies  Medication List with Changes/Refills   Current Medications    AMLODIPINE (NORVASC) 5 MG TABLET    TAKE 1 TABLET(5 MG) BY MOUTH EVERY DAY    CALCIUM-VITAMIN D TABLET 600 MG-200 UNITS    Take 1 tablet by mouth 2 (two) times daily.    ECOTRIN LOW STRENGTH 81 MG EC TABLET    TAKE 1 TABLET DAILY    FAMOTIDINE (PEPCID) 20 MG TABLET    TAKE 1 TABLET TWICE A DAY    FOLIC ACID (FOLVITE) 1 MG TABLET    TAKE 1 TABLET DAILY    FUROSEMIDE (LASIX)  "40 MG TABLET    Take 1 tablet (40 mg total) by mouth daily as needed (leg swelling).    LANCETS MISC    To check BG 4 times daily, to use with insurance preferred meter    LOSARTAN (COZAAR) 50 MG TABLET    Take 1 tablet (50 mg total) by mouth once daily. For BP and urine protein    MAGNESIUM CHLORIDE (MAG 64) 64 MG TBEC    Take 2 tablets (128 mg total) by mouth 2 (two) times daily.    ONETOUCH VERIO STRP    USE TO CHECK BLOOD GLUCOSE FOUR TIMES A DAY, TO USE WITH INSURANCE PREFERRED METER    PEN NEEDLE, DIABETIC (BD ULTRA-FINE JIM PEN NEEDLE) 32 GAUGE X 5/32" NDLE    Uses 4 times daily, on multiple daily insulin injections    PREDNISONE (DELTASONE) 5 MG TABLET    TAKE 1 TABLET(5 MG) BY MOUTH EVERY DAY    SULFAMETHOXAZOLE-TRIMETHOPRIM 400-80MG (BACTRIM,SEPTRA) 400-80 MG PER TABLET    Take 1 tablet by mouth every Mon, Wed, Fri.    TACROLIMUS (PROGRAF) 0.5 MG CAP    Take 3 capsules (1.5 mg total) by mouth every 12 (twelve) hours.   Changed and/or Refilled Medications    Modified Medication Previous Medication    INSULIN DEGLUDEC (TRESIBA FLEXTOUCH U-200) 200 UNIT/ML (3 ML) INPN insulin degludec (TRESIBA FLEXTOUCH U-200) 200 unit/mL (3 mL) InPn       Inject 32 Units into the skin every evening.    Inject 26 Units into the skin every evening.    INSULIN LISPRO (HUMALOG KWIKPEN INSULIN) 100 UNIT/ML PEN insulin lispro (HUMALOG KWIKPEN INSULIN) 100 unit/mL pen       Inject 12 Units into the skin 3 (three) times daily before meals. Plus correction scale TDD: 60    Inject 8 Units into the skin 3 (three) times daily before meals. Plus correction scale TDD: 60    PRAVASTATIN (PRAVACHOL) 20 MG TABLET pravastatin (PRAVACHOL) 20 MG tablet       Take 1 tablet (20 mg total) by mouth once daily.    TAKE 1 TABLET BY MOUTH EVERY DAY   Discontinued Medications    FLUTICASONE (FLONASE) 50 MCG/ACTUATION NASAL SPRAY    1 spray by Each Nare route once daily.      Social History     Tobacco Use    Smoking status: Never Smoker    Smokeless " "tobacco: Never Used   Substance Use Topics    Alcohol use: No      Family History   Problem Relation Age of Onset    Hypertension Mother     Diabetes Father     Kidney disease Sister     Diabetes Brother        I have reviewed the complete PMH, social history, surgical history, allergies and medications.  As well as family history.    Review of Systems   Constitutional: Positive for fatigue. Negative for chills, fever and unexpected weight change.   HENT: Negative for ear pain and sore throat.    Eyes: Negative for redness and visual disturbance.   Respiratory: Negative for cough and shortness of breath.    Cardiovascular: Negative for chest pain and palpitations.   Gastrointestinal: Negative for nausea and vomiting.   Endocrine: Negative for cold intolerance and heat intolerance.   Genitourinary: Negative for difficulty urinating and hematuria.   Musculoskeletal: Negative for arthralgias and myalgias.   Skin: Negative for rash and wound.   Allergic/Immunologic: Negative for environmental allergies and food allergies.   Neurological: Positive for numbness. Negative for weakness and headaches.   Hematological: Negative for adenopathy. Does not bruise/bleed easily.   Psychiatric/Behavioral: Negative for sleep disturbance. The patient is not nervous/anxious.      Objective:   BP (!) 146/88   Pulse 88   Temp 98.4 °F (36.9 °C) (Oral)   Ht 5' 11" (1.803 m)   Wt 106.6 kg (235 lb)   BMI 32.78 kg/m²   Physical Exam  Vitals signs and nursing note reviewed.   Constitutional:       General: He is not in acute distress.     Appearance: He is well-developed.   HENT:      Head: Normocephalic and atraumatic.   Eyes:      Pupils: Pupils are equal, round, and reactive to light.   Neck:      Musculoskeletal: Normal range of motion and neck supple.   Cardiovascular:      Rate and Rhythm: Normal rate and regular rhythm.      Pulses:           Dorsalis pedis pulses are 2+ on the right side and 2+ on the left side.        " Posterior tibial pulses are 2+ on the right side and 2+ on the left side.      Heart sounds: Normal heart sounds. No murmur.   Pulmonary:      Effort: Pulmonary effort is normal. No respiratory distress.      Breath sounds: Normal breath sounds. No wheezing.   Musculoskeletal: Normal range of motion.      Right foot: Normal range of motion. No deformity.      Left foot: Normal range of motion. No deformity.   Feet:      Right foot:      Protective Sensation: 5 sites tested. 3 sites sensed.      Skin integrity: Callus and dry skin present. No ulcer, blister, skin breakdown, erythema or warmth.      Left foot:      Protective Sensation: 5 sites tested. 4 sites sensed.      Skin integrity: Callus and dry skin present. No ulcer, blister, skin breakdown, erythema or warmth.   Skin:     General: Skin is warm and dry.      Capillary Refill: Capillary refill takes less than 2 seconds.   Neurological:      Mental Status: He is alert and oriented to person, place, and time.      Cranial Nerves: No cranial nerve deficit.   Psychiatric:         Behavior: Behavior normal.         Assessment:     1. Combined hyperlipidemia associated with type 2 diabetes mellitus    2. Hypertension associated with diabetes    3. Encounter for general adult medical examination with abnormal findings    4. Encounter for long-term (current) use of medications    5. Encounter for lipid screening for cardiovascular disease    6. Encounter for prostate cancer screening    7. Type 2 diabetes mellitus with hyperglycemia, with long-term current use of insulin    8. CKD stage 4 due to type 2 diabetes mellitus      MDM:   High medical complexity.  High risk.  I have Reviewed and summarized old records.  I have performed thorough medication reconciliation today and discussed risk and benefits of each medication.  I have reviewe labs and discussed with patient.  All questions were answered.  I am requesting old records and will review them once they are  available.    I have signed for the following orders AND/OR meds.  Orders Placed This Encounter   Procedures    CBC Without Differential     Standing Status:   Standing     Number of Occurrences:   99     Standing Expiration Date:   9/18/2021    TSH     Standing Status:   Standing     Number of Occurrences:   99     Standing Expiration Date:   9/18/2021    Comprehensive metabolic panel     Standing Status:   Standing     Number of Occurrences:   99     Standing Expiration Date:   7/15/2040    Hemoglobin A1C     Standing Status:   Standing     Number of Occurrences:   99     Standing Expiration Date:   7/15/2040    Lipid Panel     Standing Status:   Standing     Number of Occurrences:   99     Standing Expiration Date:   7/15/2040    PSA, Screening     Standing Status:   Future     Standing Expiration Date:   9/19/2021     Medications Ordered This Encounter   Medications    insulin degludec (TRESIBA FLEXTOUCH U-200) 200 unit/mL (3 mL) InPn     Sig: Inject 32 Units into the skin every evening.     Dispense:  15 mL     Refill:  11    insulin lispro (HUMALOG KWIKPEN INSULIN) 100 unit/mL pen     Sig: Inject 12 Units into the skin 3 (three) times daily before meals. Plus correction scale TDD: 60     Dispense:  15 mL     Refill:  11    pravastatin (PRAVACHOL) 20 MG tablet     Sig: Take 1 tablet (20 mg total) by mouth once daily.     Dispense:  90 tablet     Refill:  3        Follow up in about 3 months (around 10/21/2020), or if symptoms worsen or fail to improve, for Med refills, LAB RESULTS.    If no improvement in symptoms or symptoms worsen, advised to call/follow-up at clinic or go to ER. Patient voiced understanding and all questions/concerns were addressed.     DISCLAIMER: This note was compiled by using a speech recognition dictation system and therefore please be aware that typographical / speech recognition errors can and do occur.  Please contact me if you see any errors specifically.    Michel BRICEÑO  DANIEL Henley.       Office: [phone] [address] [place] [address]  FAX: [phone]

## 2020-07-22 NOTE — ASSESSMENT & PLAN NOTE
Patient is intermittently noncompliant with his medications.  Discussed hypertension disease course, DASH-diet and exercise.  Discussed medication regimen importance of treating high blood pressure.  Patient advised of risk of untreated blood pressure.    ER precautions were given for symptoms of hypertensive urgency and emergency.

## 2020-07-22 NOTE — ASSESSMENT & PLAN NOTE
Update fasting lipid panel.  Continue statin.Discussed hyperlipidemia disease course, healthy diet and increased need for exercise.  Discussed the risk of cardiovascular disease, increase stroke and heart attack risk.    Patient voiced understanding and understood the treatment plan. All questions were answered.     Patient is having cramps but has been on statin prior to having these muscle aches.  Mainly in the hands and calves.

## 2020-07-22 NOTE — ASSESSMENT & PLAN NOTE
Patient with multiple uncontrolled diabetic metrics at this time.  Need better blood pressure control and glycemic control.  Patient previously seeing Endocrinology through [facility].    Medication changes.  Update labs.Monitor hemoglobin A1c.  Discussed diabetic diet and exercise protocol.  Continue medications.  Monitor for side effects.  Discussed checking blood glucose.  Discussed symptoms to monitor for and to notify me immediately if persistent or worsening.  Follow up with Ophthalmology/Optometry and Podiatry.

## 2020-07-23 ENCOUNTER — PATIENT OUTREACH (OUTPATIENT)
Dept: ADMINISTRATIVE | Facility: HOSPITAL | Age: 60
End: 2020-07-23

## 2020-07-23 NOTE — LETTER
July 23, 2020      We are seeing Martin Hendrix ., 1960, at [facility]. Michel Henley MD is their primary care physician. To help with our [facility] records could you please send the following:     Colonoscopy and pathology    Please fax to [facility] at [phone], attention Trupti Araiza.     Thank-you in advance for your assistance. If you have any questions or concerns please contact me at [phone].     Trupti CUNHA LPN  Care Coordination Department  [facility]  [phone]

## 2020-07-26 ENCOUNTER — TELEPHONE (OUTPATIENT)
Dept: NEPHROLOGY | Facility: CLINIC | Age: 60
End: 2020-07-26

## 2020-07-26 DIAGNOSIS — N18.30 CKD (CHRONIC KIDNEY DISEASE) STAGE 3, GFR 30-59 ML/MIN: Primary | ICD-10-CM

## 2020-07-26 RX ORDER — VIT C/E/ZN/COPPR/LUTEIN/ZEAXAN 250MG-90MG
2000 CAPSULE ORAL DAILY
Refills: 0
Start: 2020-07-26

## 2020-07-26 NOTE — TELEPHONE ENCOUNTER
Please schedule labs late next week to check kidney function, potassium and urine protein, orders in. He somewhat uses the portal. Thank you

## 2020-08-03 ENCOUNTER — LAB VISIT (OUTPATIENT)
Dept: LAB | Facility: HOSPITAL | Age: 60
End: 2020-08-03
Attending: INTERNAL MEDICINE
Payer: MEDICARE

## 2020-08-03 DIAGNOSIS — N18.30 CKD (CHRONIC KIDNEY DISEASE) STAGE 3, GFR 30-59 ML/MIN: ICD-10-CM

## 2020-08-03 PROCEDURE — 80069 RENAL FUNCTION PANEL: CPT

## 2020-08-03 PROCEDURE — 36415 COLL VENOUS BLD VENIPUNCTURE: CPT | Mod: PO

## 2020-08-04 LAB
ALBUMIN SERPL BCP-MCNC: 3.3 G/DL (ref 3.5–5.2)
ANION GAP SERPL CALC-SCNC: 8 MMOL/L (ref 8–16)
BUN SERPL-MCNC: 49 MG/DL (ref 6–20)
CALCIUM SERPL-MCNC: 9.3 MG/DL (ref 8.7–10.5)
CHLORIDE SERPL-SCNC: 106 MMOL/L (ref 95–110)
CO2 SERPL-SCNC: 23 MMOL/L (ref 23–29)
CREAT SERPL-MCNC: 3.9 MG/DL (ref 0.5–1.4)
EST. GFR  (AFRICAN AMERICAN): 18.6 ML/MIN/1.73 M^2
EST. GFR  (NON AFRICAN AMERICAN): 16 ML/MIN/1.73 M^2
GLUCOSE SERPL-MCNC: 118 MG/DL (ref 70–110)
PHOSPHATE SERPL-MCNC: 3.6 MG/DL (ref 2.7–4.5)
POTASSIUM SERPL-SCNC: 4.8 MMOL/L (ref 3.5–5.1)
SODIUM SERPL-SCNC: 137 MMOL/L (ref 136–145)

## 2020-08-06 ENCOUNTER — CLINICAL SUPPORT (OUTPATIENT)
Dept: DIABETES | Facility: CLINIC | Age: 60
End: 2020-08-06
Payer: MEDICARE

## 2020-08-06 DIAGNOSIS — Z79.4 TYPE 2 DIABETES MELLITUS WITH HYPERGLYCEMIA, WITH LONG-TERM CURRENT USE OF INSULIN: ICD-10-CM

## 2020-08-06 DIAGNOSIS — E11.65 TYPE 2 DIABETES MELLITUS WITH HYPERGLYCEMIA, WITH LONG-TERM CURRENT USE OF INSULIN: ICD-10-CM

## 2020-08-06 PROCEDURE — G0108 DIAB MANAGE TRN  PER INDIV: HCPCS | Mod: S$GLB,,, | Performed by: INTERNAL MEDICINE

## 2020-08-06 PROCEDURE — 99999 PR PBB SHADOW E&M-EST. PATIENT-LVL I: CPT | Mod: PBBFAC,,,

## 2020-08-06 PROCEDURE — 99999 PR PBB SHADOW E&M-EST. PATIENT-LVL I: ICD-10-PCS | Mod: PBBFAC,,,

## 2020-08-06 PROCEDURE — G0108 PR DIAB MANAGE TRN  PER INDIV: ICD-10-PCS | Mod: S$GLB,,, | Performed by: INTERNAL MEDICINE

## 2020-08-06 NOTE — PROGRESS NOTES
Diabetes Education  Author: Hannah Hernandez RD, CDE  Date: 2020    Diabetes Care Management Summary  Diabetes Education Record Assessment: Initial    Current Diabetes Risk Level: High     Last A1c:   Lab Results   Component Value Date    HGBA1C >14.0 (H) 2020     Last Visit with Diabetes Educator:  11/15/2017    Last OPCM Referral: : Not Found   Enrolled in OPCM: No    Diabetes Type : Type II  Diabetes Diagnosis: 3-5 years  (dx post-transplant in 2017)      Current Treatment: Insulin  Tresiba 32 units every morning;   Humalog 12 units AC + CS (usually takes 2-3 times daily)            Reviewed Problem List with Patient: Yes    Health Maintenance was reviewed today with patient. Discussed with patient importance of routine eye exams, foot exams/foot care, blood work (i.e.: A1c, microalbumin, and lipid), dental visits, yearly flu vaccine, and pneumonia vaccine as indicated by PCP. Patient verbalized understanding.     Health Maintenance Topics with due status: Not Due       Topic Last Completion Date    Influenza Vaccine 2019    Lipid Panel 2020    Hemoglobin A1c 2020    Low Dose Statin 2020    Foot Exam 2020     Health Maintenance Due   Topic Date Due    TETANUS VACCINE  1980    Shingles Vaccine (1 of 2) 2012    Colorectal Cancer Screening  2012    PROSTATE-SPECIFIC ANTIGEN  2018    Eye Exam  2018    Pneumococcal Vaccine (Highest Risk) (2 of 3 - PCV13) 2019       Nutrition  Meal Planning: 3 meals per day, snacks between meal  What type of beverages do you drink?: regular soda/tea, water  (regular soft drink (twice weekly))  Meal Plan 24 Hour Recall - Breakfast: scrambled eggs, toast  Meal Plan 24 Hour Recall - Lunch: leftovers  Meal Plan 24 Hour Recall - Dinner: beans, chicken, or TV dinner  Meal Plan 24 Hour Recall - Snack: chips    Monitoring   Self Monitoring : SMBG once daily; av; thinks highest in the last few weeks is  250  Blood Glucose Logs: No  Do you use a personal continuous glucose monitor?: No  In the last month, how often have you had a low blood sugar reaction?: never  (having some false hypos)    Exercise   Exercise Type: none    Social History  Preferred Learning Method: Face to Face, Demonstration, Hands On, Reading Materials  Primary Support: Self  Occupation: works construction, 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 Discussed pt's most likely cause of recently elevated Hgb A1c. Reviewed current treatment plan and discussed all treatment options available, especially dietary and lifestyle modifications, as well as medication additions and/or changes.    Nutrition (Incorporating nutritional management into one's lifestyle): Discussion, Instructed, Individual Session, Written Materials Provided, Comprehends Key Points       Emphasized importance of eliminating all SSB. Discussed SF drink options. Encouraged fluid intake primarily from water.        Discussed all carb vs non-carb containing foods and discussed the appropriate amounts of carb to have at meals vs snacks.         Discussed need to limit snacking as able.        Reviewed label reading and how to determine appropriate serving sizes of specific carb containing foods.        Reviewed need to limit total/saturated fats despite lesser effect on BG increase. Encouraged carb sources primarily from whole grains, fresh/frozen fruits, and low-fat milk and yogurt.        Discussed multiple meal plans and snack ideas amenable to pt.     Physical Activity (incorporating  physical activity into one's lifestyle): Discussion, Instructed, Individual Session, Written Materials Provided, Comprehends Key Points       Discussed goals and benefits of regular PA. Reviewed difference between active lifestyle and structured physical activity. Encouraged PA with increased heart rate for sustained duration as tolerated. Reviewed PA goals of >150 minutes weekly.     Medications (states correct name, dose, onset, peak, duration, side effects & timing of meds): Discussion, Instructed, Individual Session, Written Materials Provided, Comprehends Key Points       Discussed timing and MOA of long vs rapid acting insulin. Reviewed need for rotation of injection sites, appropriate insulin storage, and insulin pen use. Emphasized need to take Humalog injections 5-10 min prior to eating meals.  Reviewed prescribed doses. Reviewed appropriate use of sliding scale and only to correct high BG if >4 hours since last rapid-acting injection.      Monitoring (monitoring blood glucose/other parameters & using results): Discussion, Instructed, Individual Session, Written Materials Provided, Comprehends Key Points       Discussed goal BGs for different times of day and in relation to meals. Instructed pt to test BG more often! Needs to check prior to each meal so he can appropriately use CS. Reviewed need for updated BG logs for all endo, PCP, and education appts.    Acute Complications (preventing, detecting, and treating acute complications): Discussion, Instructed, Individual Session, Written Materials Provided, Comprehends Key Points       Discussed hypoglycemia vs hyperglycemia symptoms and discussed appropriate treatments for each. Reviewed that pt is at high risk of hypo with current medication regimen. Discussed general vs severe hyperglycemia and risk of DKA.    Chronic Complications (preventing, detecting, and treating chronic complications): Discussion, Instructed, Individual Session, Written Materials  Provided, Comprehends Key Points       Reviewed annual diabetes care schedule and patient priorities.    Clinical (diabetes, other pertinent medical history, and relevant comorbidities reviewed during visit): Discussion       B/l Lung Tx 8/2017    Psychosocial (emotional response to diabetes): Individual Session       No negative feelings toward diabetes dx or disease management noted.    Diabetes Distress and Support Systems: Individual Session       No distress noted.               Goals  Patient has selected/evaluated goals during today's session: Yes, selected    Healthy Eating: Set  (Limit SSB!)  Start Date: 08/06/20  Target Date: 08/06/21    Physical Activity: Set  (Walk 10-20 min daily)  Start Date: 08/06/20  Target Date: 08/06/21    Monitoring: Set  (Check BG every day! preferably 2-3 times daily)  Start Date: 08/06/20  Target Date: 11/30/20         Diabetes Care Plan/Intervention  Education Plan/Intervention: Individual Follow-Up DSMT  (f/u in 3 months)          Diabetes Meal Plan  Restrictions: Restricted Carbohydrate          Today's Self-Management Care Plan was developed with the patient's input and is based on barriers identified during today's assessment.    The long and short-term goals in the care plan were written with the patient/caregiver's input. The patient has agreed to work toward these goals to improve his overall diabetes control.      The patient received a copy of today's self-management plan and verbalized understanding of the care plan, goals, and all of today's instructions.      The patient was encouraged to communicate with his physician and care team regarding his condition(s) and treatment.  I provided the patient with my contact information today and encouraged him to contact me via phone or patient portal as needed.           Education Units of Time   Time Spent: 60 min

## 2020-08-21 DIAGNOSIS — Z12.11 COLON CANCER SCREENING: ICD-10-CM

## 2020-09-11 DIAGNOSIS — Z01.812 PRE-PROCEDURE LAB EXAM: Primary | ICD-10-CM

## 2020-09-11 DIAGNOSIS — Z94.2 LUNG REPLACED BY TRANSPLANT: ICD-10-CM

## 2020-10-04 ENCOUNTER — LAB VISIT (OUTPATIENT)
Dept: FAMILY MEDICINE | Facility: CLINIC | Age: 60
End: 2020-10-04
Payer: MEDICARE

## 2020-10-04 DIAGNOSIS — Z01.812 PRE-PROCEDURE LAB EXAM: ICD-10-CM

## 2020-10-04 LAB — SARS-COV-2 RNA RESP QL NAA+PROBE: NOT DETECTED

## 2020-10-04 PROCEDURE — U0003 INFECTIOUS AGENT DETECTION BY NUCLEIC ACID (DNA OR RNA); SEVERE ACUTE RESPIRATORY SYNDROME CORONAVIRUS 2 (SARS-COV-2) (CORONAVIRUS DISEASE [COVID-19]), AMPLIFIED PROBE TECHNIQUE, MAKING USE OF HIGH THROUGHPUT TECHNOLOGIES AS DESCRIBED BY CMS-2020-01-R: HCPCS

## 2020-10-07 ENCOUNTER — HOSPITAL ENCOUNTER (OUTPATIENT)
Dept: RADIOLOGY | Facility: HOSPITAL | Age: 60
Discharge: HOME | End: 2020-10-07
Attending: INTERNAL MEDICINE
Payer: MEDICARE

## 2020-10-07 ENCOUNTER — OFFICE VISIT (OUTPATIENT)
Dept: TRANSPLANT | Facility: CLINIC | Age: 60
End: 2020-10-07
Payer: MEDICARE

## 2020-10-07 ENCOUNTER — HOSPITAL ENCOUNTER (OUTPATIENT)
Dept: PULMONOLOGY | Facility: HOSPITAL | Age: 60
Discharge: HOME | End: 2020-10-07
Attending: INTERNAL MEDICINE
Payer: MEDICARE

## 2020-10-07 ENCOUNTER — TELEPHONE (OUTPATIENT)
Dept: HEPATOLOGY | Facility: CLINIC | Age: 60
End: 2020-10-07

## 2020-10-07 VITALS
OXYGEN SATURATION: 97 % | HEART RATE: 88 BPM | TEMPERATURE: 97 F | SYSTOLIC BLOOD PRESSURE: 159 MMHG | DIASTOLIC BLOOD PRESSURE: 96 MMHG | RESPIRATION RATE: 20 BRPM

## 2020-10-07 DIAGNOSIS — I10 ESSENTIAL HYPERTENSION: ICD-10-CM

## 2020-10-07 DIAGNOSIS — Z48.298 ENCOUNTER FOLLOWING ORGAN TRANSPLANT: ICD-10-CM

## 2020-10-07 DIAGNOSIS — K76.9 HEPATIC LESION: ICD-10-CM

## 2020-10-07 DIAGNOSIS — R91.8 PULMONARY NODULES: ICD-10-CM

## 2020-10-07 DIAGNOSIS — Z79.2 PROPHYLACTIC ANTIBIOTIC: ICD-10-CM

## 2020-10-07 DIAGNOSIS — Z94.2 LUNG REPLACED BY TRANSPLANT: Primary | ICD-10-CM

## 2020-10-07 DIAGNOSIS — Z79.4 TYPE 2 DIABETES MELLITUS WITH HYPERGLYCEMIA, WITH LONG-TERM CURRENT USE OF INSULIN: Chronic | ICD-10-CM

## 2020-10-07 DIAGNOSIS — N18.30 STAGE 3 CHRONIC KIDNEY DISEASE, UNSPECIFIED WHETHER STAGE 3A OR 3B CKD: ICD-10-CM

## 2020-10-07 DIAGNOSIS — D84.9 IMMUNOSUPPRESSION: ICD-10-CM

## 2020-10-07 DIAGNOSIS — Z94.2 LUNG REPLACED BY TRANSPLANT: ICD-10-CM

## 2020-10-07 DIAGNOSIS — E11.65 TYPE 2 DIABETES MELLITUS WITH HYPERGLYCEMIA, WITH LONG-TERM CURRENT USE OF INSULIN: Chronic | ICD-10-CM

## 2020-10-07 LAB
FEF 25 75 LLN: 1.57
FEF 25 75 PRE REF: 81.3 %
FEF 25 75 REF: 3.12
FEV05 LLN: 1.73
FEV05 REF: 2.86
FEV1 FVC LLN: 66
FEV1 FVC PRE REF: 108 %
FEV1 FVC REF: 78
FEV1 LLN: 2.79
FEV1 PRE REF: 61.4 %
FEV1 REF: 3.67
FVC LLN: 3.62
FVC PRE REF: 56.7 %
FVC REF: 4.72
PEF LLN: 7.12
PEF PRE REF: 60.9 %
PEF REF: 9.43
PRE FEF 25 75: 2.54 L/S (ref 1.57–5.2)
PRE FET 100: 5.91 SEC
PRE FEV05 REF: 63.6 %
PRE FEV1 FVC: 84.07 % (ref 66.1–88)
PRE FEV1: 2.25 L (ref 2.79–4.5)
PRE FEV5: 1.82 L (ref 1.73–4)
PRE FVC: 2.68 L (ref 3.62–5.84)
PRE PEF: 5.74 L/S (ref 7.12–11.75)

## 2020-10-07 PROCEDURE — 99214 PR OFFICE/OUTPT VISIT, EST, LEVL IV, 30-39 MIN: ICD-10-PCS | Mod: 25,S$GLB,, | Performed by: INTERNAL MEDICINE

## 2020-10-07 PROCEDURE — 99999 PR PBB SHADOW E&M-EST. PATIENT-LVL III: CPT | Mod: PBBFAC,,, | Performed by: INTERNAL MEDICINE

## 2020-10-07 PROCEDURE — 94010 BREATHING CAPACITY TEST: ICD-10-PCS | Mod: 26,,, | Performed by: INTERNAL MEDICINE

## 2020-10-07 PROCEDURE — 71046 X-RAY EXAM CHEST 2 VIEWS: CPT | Mod: TC

## 2020-10-07 PROCEDURE — 99214 OFFICE O/P EST MOD 30 MIN: CPT | Mod: 25,S$GLB,, | Performed by: INTERNAL MEDICINE

## 2020-10-07 PROCEDURE — 99999 PR PBB SHADOW E&M-EST. PATIENT-LVL III: ICD-10-PCS | Mod: PBBFAC,,, | Performed by: INTERNAL MEDICINE

## 2020-10-07 PROCEDURE — 71046 XR CHEST PA AND LATERAL: ICD-10-PCS | Mod: 26,,, | Performed by: RADIOLOGY

## 2020-10-07 PROCEDURE — 94010 BREATHING CAPACITY TEST: CPT | Mod: 26,,, | Performed by: INTERNAL MEDICINE

## 2020-10-07 PROCEDURE — 71046 X-RAY EXAM CHEST 2 VIEWS: CPT | Mod: 26,,, | Performed by: RADIOLOGY

## 2020-10-07 RX ORDER — AZITHROMYCIN 250 MG/1
250 TABLET, FILM COATED ORAL
Qty: 13 TABLET | Refills: 11 | Status: SHIPPED | OUTPATIENT
Start: 2020-10-07 | End: 2020-12-14

## 2020-10-07 RX ORDER — AMLODIPINE BESYLATE 10 MG/1
10 TABLET ORAL DAILY
Qty: 30 TABLET | Refills: 11 | Status: SHIPPED | OUTPATIENT
Start: 2020-10-07 | End: 2020-10-12

## 2020-10-07 NOTE — TELEPHONE ENCOUNTER
Patient: Martin Hendrix Jr.       MRN: [id number]      : 1960     Age: 59 y.o.  [address]  [place] [id number]    Provider: Hepatologist Digna Gomez    Priority of review: Other    Patient Transplant Status: Other lung transplant recipient    Reason for presentation: Indeterminate lesion    Clinical Summary:   59M, hx of sarcoidosis with pulmonary hypertension,had bilateral sequential lung transplant in 2017. He also has hx of CKD 4.  GFR < 20.    Incidental finding of liver masses in 2020.    20: CT chest non-con: A hypodensity is noted along the falciform ligament on the right of 1.7 cm with an irregular appearing hypodensity within the liver near the hepatic hilum of 3.7 cm.  Liver mass protocol MRI would be suggested for further evaluation.  A 2nd hypodensity is noted within the right lobe of the liver of 1.4 cm that appears more rounded suggestive a cyst.  A hypodensity is noted of 1.3 cm  adjacent to the right portal vein as well statistically favored relate to a cyst.    17 - US: no liver mass reported.    Patient cannot have MRI or CT w/wo contrast. We can do non-con MRI if that helps.    Imaging to be reviewed: CT chest      ABO: O NEG    Platelets:   Lab Results   Component Value Date/Time     10/07/2020 09:40 AM     Creatinine:   Lab Results   Component Value Date/Time    CREATININE 4.7 (H) 10/07/2020 09:40 AM     Bilirubin:   Lab Results   Component Value Date/Time    BILITOT 0.4 10/07/2020 09:40 AM     AFP Last 3 each if available: No results found for: AFP, EXTAFP    MELD: Computed MELD-Na score unavailable. Necessary lab results were not found in the last year.  Computed MELD score unavailable. Necessary lab results were not found in the last year.    Plan:     Follow-up Provider:

## 2020-10-07 NOTE — TELEPHONE ENCOUNTER
----- Message from Robina Mcdonald MD sent at 10/7/2020 11:52 AM CDT -----  please review this patient's CT chest from 07/2020 for the hepatic lesion.  Any recommendations?

## 2020-10-07 NOTE — PROGRESS NOTES
LUNG TRANSPLANT RECIPIENT FOLLOW-UP    Reason for Visit: Follow-up after lung transplantation.                               Date of Transplant: 8/22/17     Reason for Transplant: Sarcoidosis with pulmonary hypertension     Type of Transplant: bilateral sequential lung     CMV Status: D+ / R-      Major Complications:   1. Left vocal cord dysfunction   2. 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 is a 59 y.o. year old male with the above listed transplant history who presents today for routine follow-up.   Denies any hospitalizations/exacerbations since last visit.  He feels otherwise well and denies any new respiratory concerns or limitations.  He is tolerating her medications well.         Review of Systems   Constitutional: Negative for chills, diaphoresis, fever, malaise/fatigue and weight loss.   HENT: Negative for congestion, ear discharge, ear pain, hearing loss, nosebleeds, sinus pain, sore throat and tinnitus.    Eyes: Negative for blurred vision, double vision, photophobia, pain, discharge and redness.   Respiratory: Negative for cough, hemoptysis, sputum production, shortness of breath, wheezing and stridor.    Cardiovascular: Negative for chest pain, palpitations, orthopnea, claudication, leg swelling and PND.   Gastrointestinal: Negative for abdominal pain, blood in stool, constipation, diarrhea, heartburn, melena, nausea and vomiting.   Genitourinary: Negative for dysuria, flank pain, frequency, hematuria and urgency.   Musculoskeletal: Negative for back pain, falls, joint pain, myalgias and neck pain.   Skin: Negative for itching and rash.   Neurological: Negative for dizziness, tingling, tremors, sensory change, speech change,  focal weakness, seizures, loss of consciousness, weakness and headaches.   Endo/Heme/Allergies: Negative for environmental allergies and polydipsia. Does not bruise/bleed easily.   Psychiatric/Behavioral: Negative for depression, hallucinations, memory loss, substance abuse and suicidal ideas. The patient is not nervous/anxious and does not have insomnia.      BP (!) 159/96   Pulse 88   Temp 96.9 °F (36.1 °C) (Oral)   Resp 20   SpO2 97%     Physical Exam   Constitutional: He is oriented to person, place, and time and well-developed, well-nourished, and in no distress. No distress.   HENT:   Head: Normocephalic and atraumatic.   Nose: Nose normal.   Mouth/Throat: Oropharynx is clear and moist. No oropharyngeal exudate.   Eyes: Pupils are equal, round, and reactive to light. Conjunctivae and EOM are normal. No scleral icterus.   Neck: Normal range of motion. Neck supple. No JVD present. No tracheal deviation present. No thyromegaly present.   Cardiovascular: Normal rate, regular rhythm, normal heart sounds and intact distal pulses. Exam reveals no gallop and no friction rub.   No murmur heard.  Pulmonary/Chest: Effort normal. No stridor. No respiratory distress. He has no wheezes. He has no rales. He exhibits no tenderness.   Abdominal: Soft. Bowel sounds are normal. He exhibits no distension and no mass. There is no abdominal tenderness. There is no rebound and no guarding.   Musculoskeletal: Normal range of motion.         General: No tenderness, deformity or edema.   Lymphadenopathy:     He has no cervical adenopathy.   Neurological: He is alert and oriented to person, place, and time. No cranial nerve deficit. Gait normal. Coordination normal.   Skin: Skin is warm and dry. No rash noted. He is not diaphoretic. No erythema. No pallor.   Psychiatric: Mood, memory, affect and judgment normal.   Nursing note and vitals reviewed.    Labs:  cbc, cmp, tacrolimus Latest Ref Rng & Units 7/8/2020 8/3/2020 10/7/2020    TACROLIMUS LVL 5.0 - 15.0 ng/mL - - -   WHITE BLOOD CELL COUNT 3.90 - 12.70 K/uL - - 11.62   RBC 4.60 - 6.20 M/uL - - 3.96(L)   HEMOGLOBIN 14.0 - 18.0 g/dL - - 11.6(L)   HEMATOCRIT 40.0 - 54.0 % - - 34.3(L)   MCV 82 - 98 fL - - 87   MCH 27.0 - 31.0 pg - - 29.3   MCHC 32.0 - 36.0 g/dL - - 33.8   RDW 11.5 - 14.5 % - - 12.4   PLATELETS 150 - 350 K/uL - - 337   MPV 9.2 - 12.9 fL - - 8.8(L)   GRAN # 1.8 - 7.7 K/uL - - 5.2   LYMPH # 1.0 - 4.8 K/uL - - 3.9   MONO # 0.3 - 1.0 K/uL - - 1.3(H)   EOSINOPHIL% 0.0 - 8.0 % - - 7.2   BASOPHIL% 0.0 - 1.9 % - - 1.5   DIFFERENTIAL METHOD - - - Automated   SODIUM 136 - 145 mmol/L 138 137 134(L)   POTASSIUM 3.5 - 5.1 mmol/L 4.1 4.8 4.4   CHLORIDE 95 - 110 mmol/L 103 106 98   CO2 23 - 29 mmol/L 24 23 27   GLUCOSE 70 - 110 mg/dL 163(H) 118(H) 426(H)   BUN BLD 6 - 20 mg/dL 36(H) 49(H) 45(H)   CREATININE 0.5 - 1.4 mg/dL 3.1(H) 3.9(H) 4.7(H)   CALCIUM 8.7 - 10.5 mg/dL 9.7 9.3 9.0   PROTEIN TOTAL 6.0 - 8.4 g/dL - - 7.7   ALBUMIN 3.5 - 5.2 g/dL 3.0(L) 3.3(L) 3.2(L)   BILIRUBIN TOTAL 0.1 - 1.0 mg/dL - - 0.4   ALK PHOS 55 - 135 U/L - - 125   AST 10 - 40 U/L - - 12   ALT 10 - 44 U/L - - 17   ANION GAP 8 - 16 mmol/L 11 8 9   EGFR IF AFRICAN AMERICAN >60 mL/min/1.73 m:2 24.5(A) 18.6(A) 14.8(A)   EGFR IF NON-AFRICAN AMERICAN >60 mL/min/1.73 m:2 21.2(A) 16.0(A) 12.8(A)     Pulmonary Function Tests 10/7/2020 7/1/2020 12/16/2019 9/16/2019 7/12/2019 6/10/2019 5/6/2019   FVC 2.68 2.82 2.85 3.17 3.18 3.18 3.23   FEV1 2.25 2.31 2.31 2.49 2.59 2.47 2.51   TLC (liters) - - - - - - -   DLCO (ml/mmHg sec) - - - - - - -   FVC% 56.7 59.6 59.9 66.6 64 65 66   FEV1% 61.4 62.9 62.5 67.2 68 66 67.1   FEF 25-75 2.54 - - - - - 2.29   FEF 25-75% 81.3 - - - - - 72.3   TLC% - - - - - - -   RV - - - - - - -   RV% - - - - - - -   DLCO% - - - - - - -       Imaging:  Results for orders placed during the hospital encounter of 10/07/20   X-Ray Chest PA And Lateral    Narrative EXAMINATION:  XR CHEST PA AND  LATERAL    CLINICAL HISTORY:  Lung transplant status    TECHNIQUE:  PA and lateral views of the chest were performed.    COMPARISON:  July 1, 2020    FINDINGS:  Stable findings of sternotomy, normal heart size, normal pulmonary vasculature.  Stable findings of left costophrenic angle blunting that could relate to scarring or a small amount of fluid and some stable left lower lung zone infiltrate and or atelectasis and or fibrosis.  Left mid and upper lung and right lung without additional infiltrates.  No right pleural fluid.  No pneumothoraces.      Impression Chronic and stable findings in the chest as described when compared July 1, 2020      Electronically signed by: Adeel Gonzales  Date:    10/07/2020  Time:    09:31     CT chest 07/2020  1. A hypodense region is noted near the hepatic hilum that appears irregular of approximately 3.7 cm, a mass is not excluded.  This could relate to biliary dilatation.  A liver mass protocol CT or MRI with contrast is necessary.  MRI would be preferred if one can be performed.  Multiple other liver lesions are noted that can also be evaluated on a post-contrast exam to confirm cysts or benign etiology.  2. Band like changes are noted in the left lower lobe and lingula along the pleural suggestive of a round atelectasis or round pneumonia.  3. 4 mm pulmonary nodules, in a low risk patient no further follow-up is necessary. In a high-risk patient follow-up in 1 year for size stability may be of use  4. Numerous calcified nodes suggestive of a granulomatous process, treated neoplastic process, or pneumoconiosis.  5. Coronary calcifications increasing the patient's risk of a coronary event    Assessment:  1. Lung replaced by transplant    2. Prophylactic antibiotic    3. Immunosuppression    4. Encounter following organ transplant    5. Stage 3 chronic kidney disease, unspecified whether stage 3a or 3b CKD    6. Hepatic lesion    7. Pulmonary nodules       Plan:   1. Stable FEV1  since December 2019, however, he does a drop of 200mL from his baseline FEV1 likely due CLAD.  CXR appears to be grossly stable compared to prior studies.  No clinical suspicion of acute graft dysfunction.   CXR and spirometry with routine visits.      2. Continue tacrolimus 1.5/1.5, prednisone 5. He has been off MMF because of resistant CMV in the past. May consider everolimus once BP is better optimized    3. Continue Bactrim ss    4. Cr elevated from baseline.  Nephro recs    5.  BP elevated on routine visit.  Increased amlodipine 5 mg to 10 mg daily.      6.  Hepatic lesion.  Will review with hepatology and consider further imaging    7.  CT chest in 1 year ~ 07/2021 to follow up on pulmonary nodules.      RTC in 3 months or earlier if necessary.    Robina Mcdonald MD  Pulmonary/Critical Care Medicine/Transplant Pulmonology  [facility]  10/7/2020

## 2020-10-08 ENCOUNTER — PATIENT MESSAGE (OUTPATIENT)
Dept: NEPHROLOGY | Facility: CLINIC | Age: 60
End: 2020-10-08

## 2020-10-08 ENCOUNTER — TELEPHONE (OUTPATIENT)
Dept: TRANSPLANT | Facility: CLINIC | Age: 60
End: 2020-10-08

## 2020-10-09 ENCOUNTER — PATIENT MESSAGE (OUTPATIENT)
Dept: NEPHROLOGY | Facility: CLINIC | Age: 60
End: 2020-10-09

## 2020-10-12 ENCOUNTER — OFFICE VISIT (OUTPATIENT)
Dept: NEPHROLOGY | Facility: CLINIC | Age: 60
End: 2020-10-12
Payer: MEDICARE

## 2020-10-12 VITALS
HEART RATE: 95 BPM | HEIGHT: 71 IN | DIASTOLIC BLOOD PRESSURE: 80 MMHG | BODY MASS INDEX: 33.6 KG/M2 | WEIGHT: 240 LBS | SYSTOLIC BLOOD PRESSURE: 138 MMHG | OXYGEN SATURATION: 98 %

## 2020-10-12 DIAGNOSIS — D86.0 SARCOIDOSIS OF LUNG: ICD-10-CM

## 2020-10-12 DIAGNOSIS — R80.1 PERSISTENT PROTEINURIA: ICD-10-CM

## 2020-10-12 DIAGNOSIS — N18.4 CKD (CHRONIC KIDNEY DISEASE) STAGE 4, GFR 15-29 ML/MIN: ICD-10-CM

## 2020-10-12 DIAGNOSIS — E87.5 HYPERKALEMIA: Primary | ICD-10-CM

## 2020-10-12 DIAGNOSIS — E11.59 HYPERTENSION ASSOCIATED WITH DIABETES: Chronic | ICD-10-CM

## 2020-10-12 DIAGNOSIS — I15.2 HYPERTENSION ASSOCIATED WITH DIABETES: Chronic | ICD-10-CM

## 2020-10-12 PROCEDURE — 3075F PR MOST RECENT SYSTOLIC BLOOD PRESS GE 130-139MM HG: ICD-10-PCS | Mod: CPTII,S$GLB,, | Performed by: INTERNAL MEDICINE

## 2020-10-12 PROCEDURE — 99214 OFFICE O/P EST MOD 30 MIN: CPT | Mod: S$GLB,,, | Performed by: INTERNAL MEDICINE

## 2020-10-12 PROCEDURE — 3008F BODY MASS INDEX DOCD: CPT | Mod: CPTII,S$GLB,, | Performed by: INTERNAL MEDICINE

## 2020-10-12 PROCEDURE — 99999 PR PBB SHADOW E&M-EST. PATIENT-LVL IV: ICD-10-PCS | Mod: PBBFAC,,, | Performed by: INTERNAL MEDICINE

## 2020-10-12 PROCEDURE — 99214 PR OFFICE/OUTPT VISIT, EST, LEVL IV, 30-39 MIN: ICD-10-PCS | Mod: S$GLB,,, | Performed by: INTERNAL MEDICINE

## 2020-10-12 PROCEDURE — 3075F SYST BP GE 130 - 139MM HG: CPT | Mod: CPTII,S$GLB,, | Performed by: INTERNAL MEDICINE

## 2020-10-12 PROCEDURE — 3079F DIAST BP 80-89 MM HG: CPT | Mod: CPTII,S$GLB,, | Performed by: INTERNAL MEDICINE

## 2020-10-12 PROCEDURE — 3008F PR BODY MASS INDEX (BMI) DOCUMENTED: ICD-10-PCS | Mod: CPTII,S$GLB,, | Performed by: INTERNAL MEDICINE

## 2020-10-12 PROCEDURE — 99999 PR PBB SHADOW E&M-EST. PATIENT-LVL IV: CPT | Mod: PBBFAC,,, | Performed by: INTERNAL MEDICINE

## 2020-10-12 PROCEDURE — 3079F PR MOST RECENT DIASTOLIC BLOOD PRESSURE 80-89 MM HG: ICD-10-PCS | Mod: CPTII,S$GLB,, | Performed by: INTERNAL MEDICINE

## 2020-10-12 RX ORDER — LOSARTAN POTASSIUM 100 MG/1
100 TABLET ORAL DAILY
Qty: 90 TABLET | Refills: 1 | Status: SHIPPED | OUTPATIENT
Start: 2020-10-12 | End: 2021-04-07

## 2020-10-12 RX ORDER — AMLODIPINE BESYLATE 5 MG/1
5 TABLET ORAL DAILY
Qty: 30 TABLET | Refills: 11
Start: 2020-10-12 | End: 2021-04-07

## 2020-10-13 ENCOUNTER — LAB VISIT (OUTPATIENT)
Dept: LAB | Facility: HOSPITAL | Age: 60
End: 2020-10-13
Attending: INTERNAL MEDICINE
Payer: MEDICARE

## 2020-10-13 DIAGNOSIS — E11.22 CKD STAGE 4 DUE TO TYPE 2 DIABETES MELLITUS: ICD-10-CM

## 2020-10-13 DIAGNOSIS — Z13.220 ENCOUNTER FOR LIPID SCREENING FOR CARDIOVASCULAR DISEASE: ICD-10-CM

## 2020-10-13 DIAGNOSIS — E11.59 HYPERTENSION ASSOCIATED WITH DIABETES: ICD-10-CM

## 2020-10-13 DIAGNOSIS — Z12.5 ENCOUNTER FOR PROSTATE CANCER SCREENING: ICD-10-CM

## 2020-10-13 DIAGNOSIS — Z79.899 ENCOUNTER FOR LONG-TERM (CURRENT) USE OF MEDICATIONS: ICD-10-CM

## 2020-10-13 DIAGNOSIS — Z79.4 TYPE 2 DIABETES MELLITUS WITH HYPERGLYCEMIA, WITH LONG-TERM CURRENT USE OF INSULIN: ICD-10-CM

## 2020-10-13 DIAGNOSIS — N18.4 CKD STAGE 4 DUE TO TYPE 2 DIABETES MELLITUS: ICD-10-CM

## 2020-10-13 DIAGNOSIS — Z13.6 ENCOUNTER FOR LIPID SCREENING FOR CARDIOVASCULAR DISEASE: ICD-10-CM

## 2020-10-13 DIAGNOSIS — Z00.01 ENCOUNTER FOR GENERAL ADULT MEDICAL EXAMINATION WITH ABNORMAL FINDINGS: ICD-10-CM

## 2020-10-13 DIAGNOSIS — E11.65 TYPE 2 DIABETES MELLITUS WITH HYPERGLYCEMIA, WITH LONG-TERM CURRENT USE OF INSULIN: ICD-10-CM

## 2020-10-13 DIAGNOSIS — I15.2 HYPERTENSION ASSOCIATED WITH DIABETES: ICD-10-CM

## 2020-10-13 DIAGNOSIS — E11.69 COMBINED HYPERLIPIDEMIA ASSOCIATED WITH TYPE 2 DIABETES MELLITUS: ICD-10-CM

## 2020-10-13 DIAGNOSIS — E78.2 COMBINED HYPERLIPIDEMIA ASSOCIATED WITH TYPE 2 DIABETES MELLITUS: ICD-10-CM

## 2020-10-13 LAB
ALBUMIN SERPL BCP-MCNC: 3.1 G/DL (ref 3.5–5.2)
ALP SERPL-CCNC: 101 U/L (ref 55–135)
ALT SERPL W/O P-5'-P-CCNC: 15 U/L (ref 10–44)
ANION GAP SERPL CALC-SCNC: 8 MMOL/L (ref 8–16)
AST SERPL-CCNC: 12 U/L (ref 10–40)
BILIRUB SERPL-MCNC: 0.4 MG/DL (ref 0.1–1)
BUN SERPL-MCNC: 55 MG/DL (ref 6–20)
CALCIUM SERPL-MCNC: 8.7 MG/DL (ref 8.7–10.5)
CHLORIDE SERPL-SCNC: 101 MMOL/L (ref 95–110)
CHOLEST SERPL-MCNC: 307 MG/DL (ref 120–199)
CHOLEST/HDLC SERPL: 5.7 {RATIO} (ref 2–5)
CO2 SERPL-SCNC: 26 MMOL/L (ref 23–29)
COMPLEXED PSA SERPL-MCNC: 1.3 NG/ML (ref 0–4)
CREAT SERPL-MCNC: 4.3 MG/DL (ref 0.5–1.4)
ERYTHROCYTE [DISTWIDTH] IN BLOOD BY AUTOMATED COUNT: 12.5 % (ref 11.5–14.5)
EST. GFR  (AFRICAN AMERICAN): 16.5 ML/MIN/1.73 M^2
EST. GFR  (NON AFRICAN AMERICAN): 14.3 ML/MIN/1.73 M^2
GLUCOSE SERPL-MCNC: 202 MG/DL (ref 70–110)
HCT VFR BLD AUTO: 35.4 % (ref 40–54)
HDLC SERPL-MCNC: 54 MG/DL (ref 40–75)
HDLC SERPL: 17.6 % (ref 20–50)
HGB BLD-MCNC: 11.1 G/DL (ref 14–18)
LDLC SERPL CALC-MCNC: 197.2 MG/DL (ref 63–159)
MCH RBC QN AUTO: 29.3 PG (ref 27–31)
MCHC RBC AUTO-ENTMCNC: 31.4 G/DL (ref 32–36)
MCV RBC AUTO: 93 FL (ref 82–98)
NONHDLC SERPL-MCNC: 253 MG/DL
PLATELET # BLD AUTO: 350 K/UL (ref 150–350)
PMV BLD AUTO: 8.9 FL (ref 9.2–12.9)
POTASSIUM SERPL-SCNC: 4.5 MMOL/L (ref 3.5–5.1)
PROT SERPL-MCNC: 7.3 G/DL (ref 6–8.4)
RBC # BLD AUTO: 3.79 M/UL (ref 4.6–6.2)
SODIUM SERPL-SCNC: 135 MMOL/L (ref 136–145)
TRIGL SERPL-MCNC: 279 MG/DL (ref 30–150)
TSH SERPL DL<=0.005 MIU/L-ACNC: 2.17 UIU/ML (ref 0.4–4)
WBC # BLD AUTO: 11.35 K/UL (ref 3.9–12.7)

## 2020-10-13 PROCEDURE — 83036 HEMOGLOBIN GLYCOSYLATED A1C: CPT

## 2020-10-13 PROCEDURE — 80061 LIPID PANEL: CPT

## 2020-10-13 PROCEDURE — 85027 COMPLETE CBC AUTOMATED: CPT

## 2020-10-13 PROCEDURE — 84443 ASSAY THYROID STIM HORMONE: CPT

## 2020-10-13 PROCEDURE — 84153 ASSAY OF PSA TOTAL: CPT

## 2020-10-13 PROCEDURE — 80053 COMPREHEN METABOLIC PANEL: CPT

## 2020-10-13 PROCEDURE — 36415 COLL VENOUS BLD VENIPUNCTURE: CPT | Mod: PO

## 2020-10-14 ENCOUNTER — PATIENT MESSAGE (OUTPATIENT)
Dept: FAMILY MEDICINE | Facility: CLINIC | Age: 60
End: 2020-10-14

## 2020-10-14 DIAGNOSIS — K76.9 LIVER DISEASE, UNSPECIFIED: ICD-10-CM

## 2020-10-14 LAB
ESTIMATED AVG GLUCOSE: 335 MG/DL (ref 68–131)
HBA1C MFR BLD HPLC: 13.3 % (ref 4–5.6)

## 2020-10-14 NOTE — PROGRESS NOTES
#CALL THE PATIENT# to discuss results/see if they have questions and document verification. Make FU appt if needed.    #Pend me the orders in my interpretation below.#    I have sent a message to them with the interpretation (see below).  See if they have any questions and make a follow-up appointment if not already schedule and if needed.    Also please address any outstanding health maintenance that may be due: TETANUS VACCINE due on 11/30/1980  Shingles Vaccine(1 of 2) due on 11/30/2012  Colorectal Cancer Screening due on 11/30/2012  Eye Exam due on 12/13/2018  Pneumococcal Vaccine (Highest Risk)(2 of 3 - PCV13) due on 02/07/2019  ====================================    My interpretation that was sent to them through [facility]:    Martin, I have reviewed your recent blood work.     Your complete blood count is STABLE.    Your metabolic panel which shows your glucose, kidney function, electrolytes, and liver function is STABLE.   Thyroid studies are NORMAL.   Your cholesterol is UNCONTROLLED.  PLEASE CONFIRM THAT YOU ARE TAKING PRAVASTATIN 20 MG.  IF SO MEDICATION CHANGE IS NEEDED.  YOUR PSA SCREENING IS WITHIN NORMAL LIMITS.  Your hemoglobin A1c is 13 WHICH CONTINUES TO BE UNCONTROLLED.  PLEASE MAKE AN APPOINTMENT SOON SO WE CAN DISCUSS YOUR DIABETES AND GET YOUR DIABETES UNDER CONTROL.  This test is gold standard screening test for diabetes.  It is a measures 3 months of your average blood sugar.    PLAN IS TO REPEAT LABS IN THREE MONTHS.

## 2020-10-18 PROBLEM — R80.9 PROTEINURIA: Status: ACTIVE | Noted: 2020-10-18

## 2020-10-18 PROBLEM — N18.4 CKD (CHRONIC KIDNEY DISEASE) STAGE 4, GFR 15-29 ML/MIN: Status: ACTIVE | Noted: 2020-10-18

## 2020-10-19 NOTE — PROGRESS NOTES
Subjective:       Patient ID: Martin Hendrix Jr. is a 59 y.o. White male who presents for new evaluation of Chronic Kidney Disease    HPI     He is referred by Endocrine for AVILA on CKD  He has a significant history of lung transplant and post transplant DM, HTN. His baseline creatinine is 1.6-2mg/dL but most recent sCr was 3.2 but associated with a glucose of 600.   He denies foamy urine, no hematuria and no flank pain. He follows a somewhat low sodium diet (dines out frequently, lives alone) but feels he stays hydrated. He has developed LE edema since transplant when he was started on Norvasc. No SOB nor orthopnea. No NSAID use and no herbal medications. No known kidney disease in his family     Interval history July 2020: Still no significant increase in LE edema. BP running the same, 140's, low 150's. He feels well.     Interval history Oct 2020: He saw lung txp and Norvasc was increased to 10mg. However he does have trouble with LE edema. It did of course help his BP. He has not used Lasix much. His BS was in the 400's when most recent labs were drawn. He feels well. Augmentin was started as prophylaxis.  No NSAID use     Review of Systems   Constitutional: Negative for activity change, appetite change, fatigue and unexpected weight change.   HENT: Negative for facial swelling.    Eyes: Negative for visual disturbance.   Respiratory: Negative for cough and shortness of breath.    Cardiovascular: Negative for chest pain and leg swelling.   Gastrointestinal: Negative for abdominal pain.   Genitourinary: Negative for difficulty urinating, dysuria, frequency, hematuria and urgency.   Musculoskeletal: Negative for arthralgias.   Allergic/Immunologic: Positive for immunocompromised state.   Neurological: Negative for weakness and headaches.   Hematological: Does not bruise/bleed easily.   Psychiatric/Behavioral: Negative for confusion and decreased concentration.       Objective:      Physical Exam  Vitals signs and  nursing note reviewed.   Constitutional:       General: He is not in acute distress.     Appearance: Normal appearance. He is not ill-appearing.   HENT:      Head: Atraumatic.      Mouth/Throat:      Mouth: Mucous membranes are moist.   Eyes:      General: No scleral icterus.     Conjunctiva/sclera: Conjunctivae normal.   Neck:      Vascular: No JVD.   Cardiovascular:      Rate and Rhythm: Normal rate.      Heart sounds: S1 normal and S2 normal. No murmur.   Pulmonary:      Breath sounds: No wheezing or rales.   Abdominal:      Palpations: Abdomen is soft.   Musculoskeletal:      Right lower leg: Edema present.      Left lower leg: Edema present.   Skin:     General: Skin is warm and dry.      Coloration: Skin is not jaundiced.   Neurological:      General: No focal deficit present.      Mental Status: He is oriented to person, place, and time.   Psychiatric:         Mood and Affect: Mood normal.         Behavior: Behavior is cooperative.         Thought Content: Thought content normal.         Assessment:       No diagnosis found.    Plan:           Acute on CKD  - hyperglycemia noted, hopefully that caused volume depletion. Will repeat labs to ensure improvement  - tempted to perform kidney biopsy as I still do not have a grasp of his CKD/ATN?/sarcoid/DM?    HTN  - resume Norvasc to 5mg and increase losartan to 100mg which will also help with proteinuria    - BP log in one week    Mineral and Bone Disease  - continue D3 2000u, Calcium stable after stopping calcium with D3    DM  - very poorly controlled   - per Endocrine/PCP      Add u/a to PCP labs  F/up Cr

## 2020-10-20 ENCOUNTER — HOSPITAL ENCOUNTER (OUTPATIENT)
Dept: RADIOLOGY | Facility: HOSPITAL | Age: 60
Discharge: HOME | End: 2020-10-20
Attending: STUDENT IN AN ORGANIZED HEALTH CARE EDUCATION/TRAINING PROGRAM
Payer: MEDICARE

## 2020-10-20 DIAGNOSIS — K76.9 LIVER DISEASE, UNSPECIFIED: ICD-10-CM

## 2020-10-20 PROCEDURE — 74181 MRI ABDOMEN W/O CONTRAST: CPT | Mod: 26,,, | Performed by: RADIOLOGY

## 2020-10-20 PROCEDURE — 74181 MRI ABDOMEN WITHOUT CONTRAST: ICD-10-PCS | Mod: 26,,, | Performed by: RADIOLOGY

## 2020-10-20 PROCEDURE — 74181 MRI ABDOMEN W/O CONTRAST: CPT | Mod: TC

## 2020-10-21 NOTE — PROGRESS NOTES
Called and discussed with patient. MRI shows no liver lesion. Previously abnormality seen on CT corresponds with areas of focal fatty infiltration. No imaging surveillance needed.

## 2020-12-14 ENCOUNTER — OFFICE VISIT (OUTPATIENT)
Dept: FAMILY MEDICINE | Facility: CLINIC | Age: 60
End: 2020-12-14
Payer: MEDICARE

## 2020-12-14 DIAGNOSIS — Z23 NEED FOR PNEUMOCOCCAL VACCINE: ICD-10-CM

## 2020-12-14 DIAGNOSIS — Z00.00 WELL ADULT EXAM: Primary | ICD-10-CM

## 2020-12-14 DIAGNOSIS — E11.22 CKD STAGE 4 DUE TO TYPE 2 DIABETES MELLITUS: ICD-10-CM

## 2020-12-14 DIAGNOSIS — Z13.6 ENCOUNTER FOR LIPID SCREENING FOR CARDIOVASCULAR DISEASE: ICD-10-CM

## 2020-12-14 DIAGNOSIS — Z13.220 ENCOUNTER FOR LIPID SCREENING FOR CARDIOVASCULAR DISEASE: ICD-10-CM

## 2020-12-14 DIAGNOSIS — Z79.4 TYPE 2 DIABETES MELLITUS WITH HYPERGLYCEMIA, WITH LONG-TERM CURRENT USE OF INSULIN: Chronic | ICD-10-CM

## 2020-12-14 DIAGNOSIS — N18.4 CKD STAGE 4 DUE TO TYPE 2 DIABETES MELLITUS: ICD-10-CM

## 2020-12-14 DIAGNOSIS — E11.65 TYPE 2 DIABETES MELLITUS WITH HYPERGLYCEMIA, WITH LONG-TERM CURRENT USE OF INSULIN: Chronic | ICD-10-CM

## 2020-12-14 DIAGNOSIS — J96.11 CHRONIC RESPIRATORY FAILURE WITH HYPOXIA: ICD-10-CM

## 2020-12-14 DIAGNOSIS — Z79.899 ENCOUNTER FOR LONG-TERM (CURRENT) USE OF MEDICATIONS: ICD-10-CM

## 2020-12-14 DIAGNOSIS — Z12.11 COLON CANCER SCREENING: ICD-10-CM

## 2020-12-14 PROCEDURE — 99499 RISK ADDL DX/OHS AUDIT: ICD-10-PCS | Mod: S$GLB,,, | Performed by: FAMILY MEDICINE

## 2020-12-14 PROCEDURE — 90670 PNEUMOCOCCAL CONJUGATE VACCINE 13-VALENT LESS THAN 5YO & GREATER THAN: ICD-10-PCS | Mod: S$GLB,,, | Performed by: FAMILY MEDICINE

## 2020-12-14 PROCEDURE — 3079F DIAST BP 80-89 MM HG: CPT | Mod: CPTII,S$GLB,, | Performed by: FAMILY MEDICINE

## 2020-12-14 PROCEDURE — 99999 PR PBB SHADOW E&M-EST. PATIENT-LVL V: CPT | Mod: PBBFAC,,, | Performed by: FAMILY MEDICINE

## 2020-12-14 PROCEDURE — 90670 PCV13 VACCINE IM: CPT | Mod: S$GLB,,, | Performed by: FAMILY MEDICINE

## 2020-12-14 PROCEDURE — 99999 PR PBB SHADOW E&M-EST. PATIENT-LVL V: ICD-10-PCS | Mod: PBBFAC,,, | Performed by: FAMILY MEDICINE

## 2020-12-14 PROCEDURE — 3075F PR MOST RECENT SYSTOLIC BLOOD PRESS GE 130-139MM HG: ICD-10-PCS | Mod: CPTII,S$GLB,, | Performed by: FAMILY MEDICINE

## 2020-12-14 PROCEDURE — 3008F PR BODY MASS INDEX (BMI) DOCUMENTED: ICD-10-PCS | Mod: CPTII,S$GLB,, | Performed by: FAMILY MEDICINE

## 2020-12-14 PROCEDURE — 1126F AMNT PAIN NOTED NONE PRSNT: CPT | Mod: S$GLB,,, | Performed by: FAMILY MEDICINE

## 2020-12-14 PROCEDURE — 1126F PR PAIN SEVERITY QUANTIFIED, NO PAIN PRESENT: ICD-10-PCS | Mod: S$GLB,,, | Performed by: FAMILY MEDICINE

## 2020-12-14 PROCEDURE — 3008F BODY MASS INDEX DOCD: CPT | Mod: CPTII,S$GLB,, | Performed by: FAMILY MEDICINE

## 2020-12-14 PROCEDURE — 99499 UNLISTED E&M SERVICE: CPT | Mod: S$GLB,,, | Performed by: FAMILY MEDICINE

## 2020-12-14 PROCEDURE — G0009 PNEUMOCOCCAL CONJUGATE VACCINE 13-VALENT LESS THAN 5YO & GREATER THAN: ICD-10-PCS | Mod: S$GLB,,, | Performed by: FAMILY MEDICINE

## 2020-12-14 PROCEDURE — 3075F SYST BP GE 130 - 139MM HG: CPT | Mod: CPTII,S$GLB,, | Performed by: FAMILY MEDICINE

## 2020-12-14 PROCEDURE — 3046F HEMOGLOBIN A1C LEVEL >9.0%: CPT | Mod: CPTII,S$GLB,, | Performed by: FAMILY MEDICINE

## 2020-12-14 PROCEDURE — 3079F PR MOST RECENT DIASTOLIC BLOOD PRESSURE 80-89 MM HG: ICD-10-PCS | Mod: CPTII,S$GLB,, | Performed by: FAMILY MEDICINE

## 2020-12-14 PROCEDURE — 3046F PR MOST RECENT HEMOGLOBIN A1C LEVEL > 9.0%: ICD-10-PCS | Mod: CPTII,S$GLB,, | Performed by: FAMILY MEDICINE

## 2020-12-14 PROCEDURE — 99396 PREV VISIT EST AGE 40-64: CPT | Mod: 25,S$GLB,, | Performed by: FAMILY MEDICINE

## 2020-12-14 PROCEDURE — G0009 ADMIN PNEUMOCOCCAL VACCINE: HCPCS | Mod: S$GLB,,, | Performed by: FAMILY MEDICINE

## 2020-12-14 PROCEDURE — 99396 PR PREVENTIVE VISIT,EST,40-64: ICD-10-PCS | Mod: 25,S$GLB,, | Performed by: FAMILY MEDICINE

## 2020-12-14 RX ORDER — INSULIN DEGLUDEC 200 U/ML
36 INJECTION, SOLUTION SUBCUTANEOUS NIGHTLY
Qty: 15 ML | Refills: 11 | Status: SHIPPED | OUTPATIENT
Start: 2020-12-14 | End: 2021-01-11

## 2020-12-14 RX ORDER — INSULIN LISPRO 100 [IU]/ML
12 INJECTION, SOLUTION INTRAVENOUS; SUBCUTANEOUS
Qty: 15 ML | Refills: 11 | Status: SHIPPED | OUTPATIENT
Start: 2020-12-14 | End: 2020-12-16

## 2020-12-14 NOTE — PATIENT INSTRUCTIONS
Follow up in about 3 months (around 3/14/2021), or if symptoms worsen or fail to improve, for DM.     If no improvement in symptoms or symptoms worsen, please be advised to call MD, follow-up at clinic and/or go to ER if becomes severe.    Michel Henlye M.D.        We Offer TELEHEALTH & Same Day Appointments!   Book your Telehealth appointment with me through my nurse or   Clinic appointments on [facility]!    [address] [place] [address]    Office: [phone]   FAX: [phone]    Check out my Facebook Page and Follow Me at: [email or web address]    Check out my website at [employer] by clicking on: [email or web address]    To Schedule appointments online, go to [email or web address]: [email or web address]

## 2020-12-16 DIAGNOSIS — E11.65 TYPE 2 DIABETES MELLITUS WITH HYPERGLYCEMIA, WITH LONG-TERM CURRENT USE OF INSULIN: Primary | ICD-10-CM

## 2020-12-16 DIAGNOSIS — Z79.4 TYPE 2 DIABETES MELLITUS WITH HYPERGLYCEMIA, WITH LONG-TERM CURRENT USE OF INSULIN: Primary | ICD-10-CM

## 2020-12-16 RX ORDER — INSULIN ASPART 100 [IU]/ML
12 INJECTION, SOLUTION INTRAVENOUS; SUBCUTANEOUS
Qty: 15 ML | Refills: 11 | Status: ON HOLD | OUTPATIENT
Start: 2020-12-16 | End: 2021-05-11 | Stop reason: SDUPTHER

## 2020-12-16 NOTE — TELEPHONE ENCOUNTER
Patient's insurance will not pay for Humalog.  They are recommending Fiasp FlexTouch or Novolog FlexPen.  Please review and advise.

## 2020-12-18 VITALS
TEMPERATURE: 98 F | BODY MASS INDEX: 33.88 KG/M2 | WEIGHT: 242 LBS | DIASTOLIC BLOOD PRESSURE: 82 MMHG | SYSTOLIC BLOOD PRESSURE: 138 MMHG | HEART RATE: 88 BPM | HEIGHT: 71 IN

## 2020-12-18 NOTE — PROGRESS NOTES
Patient, Martin Hendrxi Jr. (MRN #[id number]), presented with a recent estimated Glumerular Filtration Rate (eGFR) less than 15 consistent with the definition of chronic kidney disease stage 5 (ICD10 - N18.5).    eGFR if non    Date Value Ref Range Status   10/13/2020 14.3 (A) >60 mL/min/1.73 m^2 Final     Comment:     Calculation used to obtain the estimated glomerular filtration  rate (eGFR) is the CKD-EPI equation.          The patient's chronic kidney disease stage 5 was monitored, evaluated, addressed and/or treated. This addendum to the medical record is made on 12/18/2020.

## 2020-12-18 NOTE — PROGRESS NOTES
PLAN:      Problem List Items Addressed This Visit     Type 2 diabetes mellitus with hyperglycemia, with long-term current use of insulin (Chronic)     December 2020:  Again patient has multiple uncontrolled metrics.  Increase insulin dosage as prescribed.  Changing sliding scale insulin to NovoLog.  Repeat A1c in three months.    Previous plan:  Patient with multiple uncontrolled diabetic metrics at this time.  Need better blood pressure control and glycemic control.  Patient previously seeing Endocrinology through [facility].    Medication changes.  Update labs.Monitor hemoglobin A1c.  Discussed diabetic diet and exercise protocol.  Continue medications.  Monitor for side effects.  Discussed checking blood glucose.  Discussed symptoms to monitor for and to notify me immediately if persistent or worsening.  Follow up with Ophthalmology/Optometry and Podiatry.           Relevant Medications    insulin degludec (TRESIBA FLEXTOUCH U-200) 200 unit/mL (3 mL) InPn    CKD stage 4 due to type 2 diabetes mellitus (Chronic)     Patient advised follow-up closely with nephrology.  Patient has workup pending in January.         Relevant Medications    insulin degludec (TRESIBA FLEXTOUCH U-200) 200 unit/mL (3 mL) InPn    Encounter for long-term (current) use of medications (Chronic)     Complete history and physical was completed today.  Complete and thorough medication reconciliation was performed.  Discussed risks and benefits of medications.  Advised patient on orders and health maintenance.  We discussed old records and old labs if available.  Will request any records not available through epic.  Continue current medications listed on your summary sheet.           Chronic respiratory failure with hypoxia     Follow-up with specialist.  ER precautions for worsening.         Colon cancer screening    Relevant Orders    Cologuard Screening (Multitarget Stool DNA)      Other Visit Diagnoses     Well adult exam    -  Primary     "Encounter for lipid screening for cardiovascular disease        Need for pneumococcal vaccine        Relevant Orders    (In Office Administered) Pneumococcal Conjugate Vaccine (13 Valent) (IM) (Completed)        Future Appointments     Date Provider Specialty Appt Notes    1/8/2021  Family Medicine `pre procedure    1/11/2021  Radiology Post lung tx pt     1/11/2021  Lab Post lung tx pt     1/11/2021  Pulmonology Post lung tx pt     1/11/2021 Robina Mcdonald MD Transplant Post lung tx pt     3/16/2021 Michel Henley MD Family Medicine 3 mo f/u           Medication List with Changes/Refills   New Medications    INSULIN ASPART U-100 (NOVOLOG FLEXPEN U-100 INSULIN) 100 UNIT/ML (3 ML) INPN PEN    Inject 12 Units into the skin 3 (three) times daily with meals.   Current Medications    AMLODIPINE (NORVASC) 5 MG TAB    Take 1 tablet (5 mg total) by mouth once daily.    CHOLECALCIFEROL, VITAMIN D3, (VITAMIN D3) 25 MCG (1,000 UNIT) CAPSULE    Take 2 capsules (2,000 Units total) by mouth once daily.    ECOTRIN LOW STRENGTH 81 MG EC TABLET    TAKE 1 TABLET DAILY    FAMOTIDINE (PEPCID) 20 MG TABLET    TAKE 1 TABLET TWICE A DAY    FOLIC ACID (FOLVITE) 1 MG TABLET    TAKE 1 TABLET DAILY    FUROSEMIDE (LASIX) 40 MG TABLET    Take 1 tablet (40 mg total) by mouth daily as needed (leg swelling).    LANCETS MISC    To check BG 4 times daily, to use with insurance preferred meter    LOSARTAN (COZAAR) 100 MG TABLET    Take 1 tablet (100 mg total) by mouth once daily. For BP and urine protein    MAGNESIUM CHLORIDE (MAG 64) 64 MG TBEC    Take 2 tablets (128 mg total) by mouth 2 (two) times daily.    ONETOUCH VERIO STRP    USE TO CHECK BLOOD GLUCOSE FOUR TIMES A DAY, TO USE WITH INSURANCE PREFERRED METER    PEN NEEDLE, DIABETIC (BD ULTRA-FINE JIM PEN NEEDLE) 32 GAUGE X 5/32" NDLE    Uses 4 times daily, on multiple daily insulin injections    PRAVASTATIN (PRAVACHOL) 20 MG TABLET    Take 1 tablet (20 mg total) by mouth once daily.    " PREDNISONE (DELTASONE) 5 MG TABLET    TAKE 1 TABLET(5 MG) BY MOUTH EVERY DAY    SULFAMETHOXAZOLE-TRIMETHOPRIM 400-80MG (BACTRIM,SEPTRA) 400-80 MG PER TABLET    Take 1 tablet by mouth every Mon, Wed, Fri.    TACROLIMUS (PROGRAF) 0.5 MG CAP    Take 3 capsules (1.5 mg total) by mouth every 12 (twelve) hours.   Changed and/or Refilled Medications    Modified Medication Previous Medication    INSULIN DEGLUDEC (TRESIBA FLEXTOUCH U-200) 200 UNIT/ML (3 ML) INPN insulin degludec (TRESIBA FLEXTOUCH U-200) 200 unit/mL (3 mL) InPn       Inject 36 Units into the skin every evening.    Inject 32 Units into the skin every evening.   Discontinued Medications    AZITHROMYCIN (Z-REYNA) 250 MG TABLET    Take 1 tablet (250 mg total) by mouth every Mon, Wed, Fri.    INSULIN LISPRO (HUMALOG KWIKPEN INSULIN) 100 UNIT/ML PEN    Inject 12 Units into the skin 3 (three) times daily before meals. Plus correction scale TDD: 60    INSULIN LISPRO (HUMALOG KWIKPEN INSULIN) 100 UNIT/ML PEN    Inject 12 Units into the skin 3 (three) times daily before meals. Plus correction scale TDD: 60       Michel Henley M.D.     ==========================================================================  Subjective:      Patient ID: Martin Hendrix Jr. is a 60 y.o. male.  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     Chief Complaint: Annual Exam      Problem List Items Addressed This Visit     Type 2 diabetes mellitus with hyperglycemia, with long-term current use of insulin (Chronic)    Overview     Initial HPI:  Reviewed Diabetes Management StatusStatin:TakingACE/ARB:  Taking  December 2020:  Reviewed Diabetes Management Status  Patient reports taking Humalog sliding scale as needed in addition to long-acting insulin.  Statin: Taking  ACE/ARB: Taking    Screening or Prevention Patient's value Goal Complete/Controlled?   HgA1C Testing and Control   Lab Results   Component Value Date    HGBA1C 13.3 (H) 10/13/2020      Annually/Less than 8% No   Lipid profile : 10/13/2020 Annually Yes   LDL control Lab Results   Component Value Date    LDLCALC 197.2 (H) 10/13/2020    Annually/Less than 100 mg/dl  No   Nephropathy screening Lab Results   Component Value Date    LABMICR 2964.0 06/01/2020     Lab Results   Component Value Date    PROTEINUA 3+ (A) 10/13/2020    Annually Yes   Blood pressure BP Readings from Last 1 Encounters:   12/14/20 (!) 148/91    Less than 140/90 No   Dilated retinal exam : 12/13/2017 Annually No   Foot exam   : 07/21/2020 Annually Yes              Current Assessment & Plan     December 2020:  Again patient has multiple uncontrolled metrics.  Increase insulin dosage as prescribed.  Changing sliding scale insulin to NovoLog.  Repeat A1c in three months.    Previous plan:  Patient with multiple uncontrolled diabetic metrics at this time.  Need better blood pressure control and glycemic control.  Patient previously seeing Endocrinology through [facility].    Medication changes.  Update labs.Monitor hemoglobin A1c.  Discussed diabetic diet and exercise protocol.  Continue medications.  Monitor for side effects.  Discussed checking blood glucose.  Discussed symptoms to monitor for and to notify me immediately if persistent or worsening.  Follow up with Ophthalmology/Optometry and Podiatry.           CKD stage 4 due to type 2 diabetes mellitus (Chronic)    Overview     Chronic.  Control is uncertain.  Lab Results   Component Value Date    CREATININE 4.3 (H) 10/13/2020    BUN 55 (H) 10/13/2020     (L) 10/13/2020    K 4.5 10/13/2020     10/13/2020    CO2 26  10/13/2020             Current Assessment & Plan     Patient advised follow-up closely with nephrology.  Patient has workup pending in January.         Encounter for long-term (current) use of medications (Chronic)    Overview     Initial HPI:  CHRONIC. Stable. Compliant with medications for managed conditions. See medication list. No SE reported.   Routine lab analysis is being monitored. Refills were addressed.  ==================================================  DECEMBER 2020:    CHRONIC. Stable. Compliant with medications for managed conditions. See medication list. No SE reported.   Routine lab analysis is being monitored. Refills were addressed.  Lab Results   Component Value Date    WBC 11.35 10/13/2020    HGB 11.1 (L) 10/13/2020    HCT 35.4 (L) 10/13/2020    MCV 93 10/13/2020     10/13/2020         Chemistry        Component Value Date/Time     (L) 10/13/2020 0837    K 4.5 10/13/2020 0837     10/13/2020 0837    CO2 26 10/13/2020 0837    BUN 55 (H) 10/13/2020 0837    CREATININE 4.3 (H) 10/13/2020 0837     (H) 10/13/2020 0837        Component Value Date/Time    CALCIUM 8.7 10/13/2020 0837    ALKPHOS 101 10/13/2020 0837    AST 12 10/13/2020 0837    ALT 15 10/13/2020 0837    BILITOT 0.4 10/13/2020 0837    ESTGFRAFRICA 16.5 (A) 10/13/2020 0837    EGFRNONAA 14.3 (A) 10/13/2020 0837          Lab Results   Component Value Date    TSH 2.175 10/13/2020    T6AAZIT 108 04/24/2017    G8DBUHZ 6.9 04/24/2017    FREET4 1.06 04/24/2017    T3FREE 3.1 04/24/2017       =================================================         Current Assessment & Plan     Complete history and physical was completed today.  Complete and thorough medication reconciliation was performed.  Discussed risks and benefits of medications.  Advised patient on orders and health maintenance.  We discussed old records and old labs if available.  Will request any records not available through epic.  Continue current medications  listed on your summary sheet.           Chronic respiratory failure with hypoxia    Overview     Chronic.  Intermittent control.  Patient reports history of lung transplant.  Patient follows closely with pulmonology and lung transplant seen.         Current Assessment & Plan     Follow-up with specialist.  ER precautions for worsening.         Colon cancer screening      Other Visit Diagnoses     Well adult exam    -  Primary    Encounter for lipid screening for cardiovascular disease        Need for pneumococcal vaccine               Past Medical History:  Past Medical History:   Diagnosis Date    Acute rejection of lung transplant 11/3/2017    AVILA (acute kidney injury) 8/27/2017    Atrial fibrillation 8/23/2017    CKD (chronic kidney disease) stage 3, GFR 30-59 ml/min     Dr. Peacock    Hypertension     On home oxygen therapy     KRISTYN on CPAP     Osteopenia     Pancreatitis 2009    hospitalized    Pulmonary hypertension     Pure hypercholesterolemia 11/15/2017    Sarcoidosis     Shortness of breath     Type 2 diabetes mellitus 11/5/2017     Past Surgical History:   Procedure Laterality Date    ABDOMINAL SURGERY      6 weeks old    BRONCHOSCOPY      BRONCHOSCOPY N/A 8/22/2018    Procedure: flexible bronchoscopy with tissue biopsy CPT 19907;  Surgeon: [facility] Provider;  Location: [facility] OR [address] [place] [address];  Service: Endoscopy;  Laterality: N/A;    BRONCHOSCOPY N/A 11/20/2018    Procedure: flexible bronchoscopy with tissue biopy CPT 58698;  Surgeon: [facility] Diagnostic Provider;  Location: [address];  Service: Anesthesiology;  Laterality: N/A;    CHEST TUBE INSERTION      CHOLECYSTECTOMY      COLONOSCOPY      ESOPHAGOGASTRODUODENOSCOPY N/A 8/6/2018    Procedure: EGD (ESOPHAGOGASTRODUODENOSCOPY);  Surgeon: Ramu Eisenberg MD;  Location: [address]);  Service: Endoscopy;  Laterality: N/A;  off pepcid and all acid reducers for 2 weeks; PA > 50    INSERTION OF TUNNELED CENTRAL VENOUS  "HEMODIALYSIS CATHETER N/A 3/13/2019    Procedure: INSERTION, CATHETER, CENTRAL VENOUS, HEMODIALYSIS, TUNNELED;  Surgeon: Edy Gonzalez MD;  Location: [facility] CATH LAB;  Service: Nephrology;  Laterality: N/A;    LUNG BIOPSY      LUNG TRANSPLANT  08/2017    REMOVAL OF TUNNELED CENTRAL VENOUS CATHETER (CVC) N/A 5/16/2019    Procedure: REMOVAL, CATHETER, CENTRAL VENOUS, TUNNELED;  Surgeon: Edy Gonzalez MD;  Location: [facility] CATH LAB;  Service: Nephrology;  Laterality: N/A;    TONSILLECTOMY       Review of patient's allergies indicates:  No Known Allergies  Medication List with Changes/Refills   New Medications    INSULIN ASPART U-100 (NOVOLOG FLEXPEN U-100 INSULIN) 100 UNIT/ML (3 ML) INPN PEN    Inject 12 Units into the skin 3 (three) times daily with meals.   Current Medications    AMLODIPINE (NORVASC) 5 MG TAB    Take 1 tablet (5 mg total) by mouth once daily.    CHOLECALCIFEROL, VITAMIN D3, (VITAMIN D3) 25 MCG (1,000 UNIT) CAPSULE    Take 2 capsules (2,000 Units total) by mouth once daily.    ECOTRIN LOW STRENGTH 81 MG EC TABLET    TAKE 1 TABLET DAILY    FAMOTIDINE (PEPCID) 20 MG TABLET    TAKE 1 TABLET TWICE A DAY    FOLIC ACID (FOLVITE) 1 MG TABLET    TAKE 1 TABLET DAILY    FUROSEMIDE (LASIX) 40 MG TABLET    Take 1 tablet (40 mg total) by mouth daily as needed (leg swelling).    LANCETS MISC    To check BG 4 times daily, to use with insurance preferred meter    LOSARTAN (COZAAR) 100 MG TABLET    Take 1 tablet (100 mg total) by mouth once daily. For BP and urine protein    MAGNESIUM CHLORIDE (MAG 64) 64 MG TBEC    Take 2 tablets (128 mg total) by mouth 2 (two) times daily.    ONETOUCH VERIO STRP    USE TO CHECK BLOOD GLUCOSE FOUR TIMES A DAY, TO USE WITH INSURANCE PREFERRED METER    PEN NEEDLE, DIABETIC (BD ULTRA-FINE JIM PEN NEEDLE) 32 GAUGE X 5/32" NDLE    Uses 4 times daily, on multiple daily insulin injections    PRAVASTATIN (PRAVACHOL) 20 MG TABLET    Take 1 tablet (20 mg total) by mouth once " daily.    PREDNISONE (DELTASONE) 5 MG TABLET    TAKE 1 TABLET(5 MG) BY MOUTH EVERY DAY    SULFAMETHOXAZOLE-TRIMETHOPRIM 400-80MG (BACTRIM,SEPTRA) 400-80 MG PER TABLET    Take 1 tablet by mouth every Mon, Wed, Fri.    TACROLIMUS (PROGRAF) 0.5 MG CAP    Take 3 capsules (1.5 mg total) by mouth every 12 (twelve) hours.   Changed and/or Refilled Medications    Modified Medication Previous Medication    INSULIN DEGLUDEC (TRESIBA FLEXTOUCH U-200) 200 UNIT/ML (3 ML) INPN insulin degludec (TRESIBA FLEXTOUCH U-200) 200 unit/mL (3 mL) InPn       Inject 36 Units into the skin every evening.    Inject 32 Units into the skin every evening.   Discontinued Medications    AZITHROMYCIN (Z-REYNA) 250 MG TABLET    Take 1 tablet (250 mg total) by mouth every Mon, Wed, Fri.    INSULIN LISPRO (HUMALOG KWIKPEN INSULIN) 100 UNIT/ML PEN    Inject 12 Units into the skin 3 (three) times daily before meals. Plus correction scale TDD: 60    INSULIN LISPRO (HUMALOG KWIKPEN INSULIN) 100 UNIT/ML PEN    Inject 12 Units into the skin 3 (three) times daily before meals. Plus correction scale TDD: 60      Social History     Tobacco Use    Smoking status: Never Smoker    Smokeless tobacco: Never Used   Substance Use Topics    Alcohol use: No      Family History   Problem Relation Age of Onset    Hypertension Mother     Diabetes Father     Kidney disease Sister     Diabetes Brother        I have reviewed the complete PMH, social history, surgical history, allergies and medications.  As well as family history.    Review of Systems   Constitutional: Positive for fatigue. Negative for chills, fever and unexpected weight change.   HENT: Negative for ear pain and sore throat.    Eyes: Negative for redness and visual disturbance.   Respiratory: Positive for cough and shortness of breath (Chronic ).    Cardiovascular: Negative for chest pain and palpitations.   Gastrointestinal: Negative for nausea and vomiting.   Endocrine: Negative for cold intolerance  "and heat intolerance.   Genitourinary: Negative for difficulty urinating and hematuria.   Musculoskeletal: Negative for arthralgias and myalgias.   Skin: Negative for rash and wound.   Allergic/Immunologic: Negative for environmental allergies and food allergies.   Neurological: Negative for weakness and headaches.   Hematological: Negative for adenopathy. Does not bruise/bleed easily.   Psychiatric/Behavioral: Negative for sleep disturbance. The patient is not nervous/anxious.      Objective:   BP (!) 148/91   Pulse 88   Temp 97.7 °F (36.5 °C)   Ht 5' 11" (1.803 m)   Wt 109.8 kg (242 lb)   BMI 33.75 kg/m²   Physical Exam  Vitals signs and nursing note reviewed.   Constitutional:       General: He is not in acute distress.     Appearance: He is well-developed.   HENT:      Head: Normocephalic and atraumatic.      Right Ear: External ear normal.      Left Ear: External ear normal.      Nose: Nose normal. No rhinorrhea.   Eyes:      Extraocular Movements: Extraocular movements intact.      Pupils: Pupils are equal, round, and reactive to light.   Neck:      Musculoskeletal: Normal range of motion and neck supple.   Cardiovascular:      Rate and Rhythm: Normal rate.      Pulses: Normal pulses.   Pulmonary:      Effort: Pulmonary effort is normal. No respiratory distress.      Comments: Coarse breath sounds bilaterally  Musculoskeletal: Normal range of motion.   Skin:     General: Skin is warm and dry.      Capillary Refill: Capillary refill takes less than 2 seconds.   Neurological:      General: No focal deficit present.      Mental Status: He is alert and oriented to person, place, and time.   Psychiatric:         Mood and Affect: Mood normal.         Behavior: Behavior normal.         Assessment:     1. Well adult exam    2. Chronic respiratory failure with hypoxia    3. Type 2 diabetes mellitus with hyperglycemia, with long-term current use of insulin    4. CKD stage 4 due to type 2 diabetes mellitus    5. " Encounter for long-term (current) use of medications    6. Encounter for lipid screening for cardiovascular disease    7. Need for pneumococcal vaccine    8. Colon cancer screening      MDM:   Patient here for annual wellness visit.  This noted specifically for chronic conditions and medication refills.  I have Reviewed and summarized old records.  I have performed thorough medication reconciliation today and discussed risk and benefits of each medication.  I have reviewed labs and discussed with patient.  All questions were answered.  I am requesting old records and will review them once they are available.    I have signed for the following orders AND/OR meds.  Orders Placed This Encounter   Procedures    Cologuard Screening (Multitarget Stool DNA)     Standing Status:   Future     Number of Occurrences:   1     Standing Expiration Date:   2/12/2022    (In Office Administered) Pneumococcal Conjugate Vaccine (13 Valent) (IM)     Medications Ordered This Encounter   Medications    insulin degludec (TRESIBA FLEXTOUCH U-200) 200 unit/mL (3 mL) InPn     Sig: Inject 36 Units into the skin every evening.     Dispense:  15 mL     Refill:  11        Follow up in about 3 months (around 3/14/2021), or if symptoms worsen or fail to improve, for DM.    If no improvement in symptoms or symptoms worsen, advised to call/follow-up at clinic or go to ER. Patient voiced understanding and all questions/concerns were addressed.     DISCLAIMER: This note was compiled by using a speech recognition dictation system and therefore please be aware that typographical / speech recognition errors can and do occur.  Please contact me if you see any errors specifically.    Michel Henley M.D.       Office: [phone] [id number] [place] [address]  FAX: [phone]

## 2020-12-18 NOTE — PROGRESS NOTES
This note is specifically for wellness visit performed today.     WELLNESS EXAM      Patient ID: Martin Hendrix Jr. is a 60 y.o. male.  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     Chief Complaint:  Encounter for wellness exam    Well Adult Physical: Patient here for a comprehensive physical exam.The patient reports chronic problems.  See other note.  Do you take any herbs or supplements that were not prescribed by a doctor? no   Are you taking calcium supplements? no   Date last PSA: Reviewed  Lab Results   Component Value Date    PSA 1.3 10/13/2020    PSA 1.1 04/24/2017      No history of STDs    Health Maintenance Topics with due status: Not Due       Topic Last Completion Date    Foot Exam 07/21/2020    PROSTATE-SPECIFIC ANTIGEN 10/13/2020    Lipid Panel 10/13/2020    Hemoglobin A1c 10/13/2020    Pneumococcal Vaccine (Highest Risk) 12/14/2020    Low Dose Statin 12/18/2020        ==============================================  History reviewed.     Health Maintenance Due   Topic Date Due    TETANUS VACCINE  11/30/1980    Shingles Vaccine (1 of 2) 11/30/2012    Colorectal Cancer Screening  11/30/2012    Eye Exam  12/13/2018    Cologuard ordered per patient preference    Past Medical History:  Past Medical History:   Diagnosis Date    Acute rejection of lung transplant 11/3/2017    AVILA (acute kidney injury) 8/27/2017    Atrial fibrillation 8/23/2017    CKD (chronic kidney disease) stage 3, GFR 30-59 ml/min     Dr. Peacock    Hypertension     On home oxygen therapy     KRISTYN on CPAP     Osteopenia     Pancreatitis 2009     hospitalized    Pulmonary hypertension     Pure hypercholesterolemia 11/15/2017    Sarcoidosis     Shortness of breath     Type 2 diabetes mellitus 11/5/2017     Past Surgical History:   Procedure Laterality Date    ABDOMINAL SURGERY      6 weeks old    BRONCHOSCOPY      BRONCHOSCOPY N/A 8/22/2018    Procedure: flexible bronchoscopy with tissue biopsy CPT 46392;  Surgeon: [facility] Diagnostic Provider;  [facility];  Service: Endoscopy;  Laterality: N/A;    BRONCHOSCOPY N/A 11/20/2018    Procedure: flexible bronchoscopy with tissue biopy CPT 63394;  Surgeon: [facility] Diagnostic Provider;  [facility];  Service: Anesthesiology;  Laterality: N/A;    CHEST TUBE INSERTION      CHOLECYSTECTOMY      COLONOSCOPY      ESOPHAGOGASTRODUODENOSCOPY N/A 8/6/2018    Procedure: EGD (ESOPHAGOGASTRODUODENOSCOPY);  Surgeon: Ramu Eisenberg MD;  Location: [facility] (2ND FLR);  Service: Endoscopy;  Laterality: N/A;  off pepcid and all acid reducers for 2 weeks; PA > 50    INSERTION OF TUNNELED CENTRAL VENOUS HEMODIALYSIS CATHETER N/A 3/13/2019    Procedure: INSERTION, CATHETER, CENTRAL VENOUS, HEMODIALYSIS, TUNNELED;  Surgeon: Edy Gonzalez MD;  Location: [facility] CATH LAB;  Service: Nephrology;  Laterality: N/A;    LUNG BIOPSY      LUNG TRANSPLANT  08/2017    REMOVAL OF TUNNELED CENTRAL VENOUS CATHETER (CVC) N/A 5/16/2019    Procedure: REMOVAL, CATHETER, CENTRAL VENOUS, TUNNELED;  Surgeon: Edy Gonzalez MD;  Location: [facility] CATH LAB;  Service: Nephrology;  Laterality: N/A;    TONSILLECTOMY       Review of patient's allergies indicates:  No Known Allergies  Current Outpatient Medications on File Prior to Visit   Medication Sig Dispense Refill    amLODIPine (NORVASC) 5 MG Tab Take 1 tablet (5 mg total) by mouth once daily. 30 tablet 11    cholecalciferol, vitamin D3, (VITAMIN D3) 25 mcg (1,000 unit) capsule Take 2 capsules (2,000 Units total) by mouth once daily.  0    ECOTRIN LOW  "STRENGTH 81 mg EC tablet TAKE 1 TABLET DAILY 2 tablet 4    famotidine (PEPCID) 20 MG tablet TAKE 1 TABLET TWICE A  tablet 4    folic acid (FOLVITE) 1 MG tablet TAKE 1 TABLET DAILY 90 tablet 4    furosemide (LASIX) 40 MG tablet Take 1 tablet (40 mg total) by mouth daily as needed (leg swelling). 30 tablet 11    lancets Misc To check BG 4 times daily, to use with insurance preferred meter 350 each 3    losartan (COZAAR) 100 MG tablet Take 1 tablet (100 mg total) by mouth once daily. For BP and urine protein 90 tablet 1    magnesium chloride (MAG 64) 64 mg TbEC Take 2 tablets (128 mg total) by mouth 2 (two) times daily. 120 tablet 11    ONETOUCH VERIO Strp USE TO CHECK BLOOD GLUCOSE FOUR TIMES A DAY, TO USE WITH INSURANCE PREFERRED METER 350 each 3    pen needle, diabetic (BD ULTRA-FINE JIM PEN NEEDLE) 32 gauge x 5/32" Ndle Uses 4 times daily, on multiple daily insulin injections 350 each 3    pravastatin (PRAVACHOL) 20 MG tablet Take 1 tablet (20 mg total) by mouth once daily. 90 tablet 3    predniSONE (DELTASONE) 5 MG tablet TAKE 1 TABLET(5 MG) BY MOUTH EVERY DAY 30 tablet 11    tacrolimus (PROGRAF) 0.5 MG Cap Take 3 capsules (1.5 mg total) by mouth every 12 (twelve) hours. 180 capsule 11    sulfamethoxazole-trimethoprim 400-80mg (BACTRIM,SEPTRA) 400-80 mg per tablet Take 1 tablet by mouth every Mon, Wed, Fri. (Patient not taking: Reported on 12/14/2020) 15 tablet 11    [DISCONTINUED] ACCU-CHEK DEANNE PLUS METER Misc USE AS DIRECTED  0     No current facility-administered medications on file prior to visit.      Social History     Socioeconomic History    Marital status:      Spouse name: Not on file    Number of children: Not on file    Years of education: Not on file    Highest education level: Not on file   Occupational History    Not on file   Social Needs    Financial resource strain: Not very hard    Food insecurity     Worry: Never true     Inability: Never true    " Transportation needs     Medical: No     Non-medical: No   Tobacco Use    Smoking status: Never Smoker    Smokeless tobacco: Never Used   Substance and Sexual Activity    Alcohol use: No    Drug use: No    Sexual activity: Not on file   Lifestyle    Physical activity     Days per week: 5 days     Minutes per session: 30 min    Stress: Only a little   Relationships    Social connections     Talks on phone: More than three times a week     Gets together: More than three times a week     Attends Sabianist service: More than 4 times per year     Active member of club or organization: No     Attends meetings of clubs or organizations: Never     Relationship status:    Other Topics Concern    Not on file   Social History Narrative    Not on file     Family History   Problem Relation Age of Onset    Hypertension Mother     Diabetes Father     Kidney disease Sister     Diabetes Brother        Vitals:    12/14/20 1543   BP: 138/82   Pulse:    Temp:       Body mass index is 33.75 kg/m².     Review of Systems   See other note for review of systems.  Otherwise negative.    Objective:      Physical Exam  Vitals signs and nursing note reviewed.   Constitutional:       General: He is not in acute distress.     Appearance: He is well-developed.   HENT:      Head: Normocephalic and atraumatic.      Right Ear: External ear normal.      Left Ear: External ear normal.      Nose: Nose normal. No rhinorrhea.   Eyes:      Extraocular Movements: Extraocular movements intact.      Pupils: Pupils are equal, round, and reactive to light.   Neck:      Musculoskeletal: Normal range of motion and neck supple.   Cardiovascular:      Rate and Rhythm: Normal rate.      Pulses: Normal pulses.   Pulmonary:      Effort: Pulmonary effort is normal.      Breath sounds: No wheezing.   Musculoskeletal: Normal range of motion.   Skin:     General: Skin is warm and dry.      Capillary Refill: Capillary refill takes less than 2 seconds.    Neurological:      General: No focal deficit present.      Mental Status: He is alert and oriented to person, place, and time.   Psychiatric:         Mood and Affect: Mood normal.         Behavior: Behavior normal.          Assessment / Plan:      1.  Routine health exam-patient here for annual wellness exam.  Labs ordered.  Health maintenance was reviewed and ordered.    Complete history and physical was completed today.  Complete and thorough medication reconciliation was performed.  Discussed risks and benefits of medications.  Advised patient on orders and health maintenance.  We discussed old records and old labs if available.  Will request any records not available through epic.  Continue current medications listed on your summary sheet.    All questions were answered. Patient had no further concerns. Advised of diagnoses and plan. Follow up as planned or return sooner if symptoms persist or worsen.     Orders Placed This Encounter   Procedures    Cologuard Screening (Multitarget Stool DNA)     Standing Status:   Future     Number of Occurrences:   1     Standing Expiration Date:   2/12/2022    (In Office Administered) Pneumococcal Conjugate Vaccine (13 Valent) (IM)       Medications Ordered This Encounter   Medications    insulin degludec (TRESIBA FLEXTOUCH U-200) 200 unit/mL (3 mL) InPn     Sig: Inject 36 Units into the skin every evening.     Dispense:  15 mL     Refill:  11        Michel Henley MD

## 2020-12-18 NOTE — ASSESSMENT & PLAN NOTE
December 2020:  Again patient has multiple uncontrolled metrics.  Increase insulin dosage as prescribed.  Changing sliding scale insulin to NovoLog.  Repeat A1c in three months.    Previous plan:  Patient with multiple uncontrolled diabetic metrics at this time.  Need better blood pressure control and glycemic control.  Patient previously seeing Endocrinology through [facility].    Medication changes.  Update labs.Monitor hemoglobin A1c.  Discussed diabetic diet and exercise protocol.  Continue medications.  Monitor for side effects.  Discussed checking blood glucose.  Discussed symptoms to monitor for and to notify me immediately if persistent or worsening.  Follow up with Ophthalmology/Optometry and Podiatry.

## 2020-12-21 DIAGNOSIS — Z94.2 LUNG REPLACED BY TRANSPLANT: Primary | ICD-10-CM

## 2021-01-07 ENCOUNTER — DOCUMENTATION ONLY (OUTPATIENT)
Dept: TRANSPLANT | Facility: CLINIC | Age: 61
End: 2021-01-07

## 2021-01-08 ENCOUNTER — PATIENT MESSAGE (OUTPATIENT)
Dept: TRANSPLANT | Facility: CLINIC | Age: 61
End: 2021-01-08

## 2021-01-11 ENCOUNTER — OFFICE VISIT (OUTPATIENT)
Dept: TRANSPLANT | Facility: CLINIC | Age: 61
DRG: 988 | End: 2021-01-11
Payer: MEDICARE

## 2021-01-11 ENCOUNTER — HOSPITAL ENCOUNTER (OUTPATIENT)
Dept: RADIOLOGY | Facility: HOSPITAL | Age: 61
Discharge: HOME | DRG: 988 | End: 2021-01-11
Attending: INTERNAL MEDICINE
Payer: MEDICARE

## 2021-01-11 ENCOUNTER — HOSPITAL ENCOUNTER (OUTPATIENT)
Dept: PULMONOLOGY | Facility: HOSPITAL | Age: 61
Discharge: HOME | DRG: 988 | End: 2021-01-11
Attending: INTERNAL MEDICINE
Payer: MEDICARE

## 2021-01-11 ENCOUNTER — HOSPITAL ENCOUNTER (INPATIENT)
Facility: HOSPITAL | Age: 61
LOS: 2 days | Discharge: HOME | DRG: 988 | End: 2021-01-13
Attending: INTERNAL MEDICINE | Admitting: INTERNAL MEDICINE
Payer: MEDICARE

## 2021-01-11 VITALS
RESPIRATION RATE: 20 BRPM | TEMPERATURE: 97 F | WEIGHT: 240.31 LBS | HEART RATE: 98 BPM | HEIGHT: 70 IN | SYSTOLIC BLOOD PRESSURE: 148 MMHG | BODY MASS INDEX: 34.4 KG/M2 | DIASTOLIC BLOOD PRESSURE: 79 MMHG | OXYGEN SATURATION: 97 %

## 2021-01-11 DIAGNOSIS — Z48.298 ENCOUNTER FOLLOWING ORGAN TRANSPLANT: Primary | ICD-10-CM

## 2021-01-11 DIAGNOSIS — R06.00 DYSPNEA, UNSPECIFIED TYPE: ICD-10-CM

## 2021-01-11 DIAGNOSIS — R19.7 DIARRHEA, UNSPECIFIED TYPE: Primary | ICD-10-CM

## 2021-01-11 DIAGNOSIS — N17.9 AKI (ACUTE KIDNEY INJURY): Primary | ICD-10-CM

## 2021-01-11 DIAGNOSIS — Z79.4 TYPE 2 DIABETES MELLITUS WITH HYPERGLYCEMIA, WITH LONG-TERM CURRENT USE OF INSULIN: Chronic | ICD-10-CM

## 2021-01-11 DIAGNOSIS — R06.00 DYSPNEA: ICD-10-CM

## 2021-01-11 DIAGNOSIS — E11.65 TYPE 2 DIABETES MELLITUS WITH HYPERGLYCEMIA, WITH LONG-TERM CURRENT USE OF INSULIN: Chronic | ICD-10-CM

## 2021-01-11 DIAGNOSIS — Z94.2 LUNG REPLACED BY TRANSPLANT: ICD-10-CM

## 2021-01-11 DIAGNOSIS — D84.9 IMMUNOSUPPRESSION: ICD-10-CM

## 2021-01-11 DIAGNOSIS — E11.22 CKD STAGE 4 DUE TO TYPE 2 DIABETES MELLITUS: ICD-10-CM

## 2021-01-11 DIAGNOSIS — A09 DIARRHEA OF INFECTIOUS ORIGIN: ICD-10-CM

## 2021-01-11 DIAGNOSIS — Z79.4 TYPE 2 DIABETES MELLITUS WITH HYPERGLYCEMIA, WITH LONG-TERM CURRENT USE OF INSULIN: Primary | Chronic | ICD-10-CM

## 2021-01-11 DIAGNOSIS — N18.4 CKD STAGE 4 DUE TO TYPE 2 DIABETES MELLITUS: ICD-10-CM

## 2021-01-11 DIAGNOSIS — E11.65 TYPE 2 DIABETES MELLITUS WITH HYPERGLYCEMIA, WITH LONG-TERM CURRENT USE OF INSULIN: ICD-10-CM

## 2021-01-11 DIAGNOSIS — E11.65 TYPE 2 DIABETES MELLITUS WITH HYPERGLYCEMIA, WITH LONG-TERM CURRENT USE OF INSULIN: Primary | Chronic | ICD-10-CM

## 2021-01-11 DIAGNOSIS — Z79.4 TYPE 2 DIABETES MELLITUS WITH HYPERGLYCEMIA, WITH LONG-TERM CURRENT USE OF INSULIN: ICD-10-CM

## 2021-01-11 DIAGNOSIS — N18.30 STAGE 3 CHRONIC KIDNEY DISEASE, UNSPECIFIED WHETHER STAGE 3A OR 3B CKD: ICD-10-CM

## 2021-01-11 DIAGNOSIS — Z79.2 PROPHYLACTIC ANTIBIOTIC: ICD-10-CM

## 2021-01-11 PROBLEM — T86.819 COMPLICATION OF LUNG TRANSPLANT: Status: ACTIVE | Noted: 2017-08-24

## 2021-01-11 LAB
ADENOVIRUS: NOT DETECTED
BACTERIA #/AREA URNS AUTO: ABNORMAL /HPF
BILIRUB UR QL STRIP: NEGATIVE
BORDETELLA PARAPERTUSSIS (IS1001): NOT DETECTED
BORDETELLA PERTUSSIS (PTXP): NOT DETECTED
CHLAMYDIA PNEUMONIAE: NOT DETECTED
CLARITY UR REFRACT.AUTO: ABNORMAL
COLOR UR AUTO: YELLOW
CORONAVIRUS 229E, COMMON COLD VIRUS: NOT DETECTED
CORONAVIRUS HKU1, COMMON COLD VIRUS: NOT DETECTED
CORONAVIRUS NL63, COMMON COLD VIRUS: NOT DETECTED
CORONAVIRUS OC43, COMMON COLD VIRUS: NOT DETECTED
CREAT UR-MCNC: 119 MG/DL (ref 23–375)
FEF 25 75 LLN: 1.56
FEF 25 75 PRE REF: 69.9 %
FEF 25 75 REF: 3.1
FEV05 LLN: 1.71
FEV05 REF: 2.85
FEV1 FVC LLN: 66
FEV1 FVC PRE REF: 109.9 %
FEV1 FVC REF: 78
FEV1 LLN: 2.78
FEV1 PRE REF: 51.5 %
FEV1 REF: 3.66
FLUBV RNA NPH QL NAA+NON-PROBE: NOT DETECTED
FVC LLN: 3.61
FVC PRE REF: 46.8 %
FVC REF: 4.71
GLUCOSE UR QL STRIP: ABNORMAL
HGB UR QL STRIP: NEGATIVE
HPIV1 RNA NPH QL NAA+NON-PROBE: NOT DETECTED
HPIV2 RNA NPH QL NAA+NON-PROBE: NOT DETECTED
HPIV3 RNA NPH QL NAA+NON-PROBE: NOT DETECTED
HPIV4 RNA NPH QL NAA+NON-PROBE: NOT DETECTED
HUMAN METAPNEUMOVIRUS: NOT DETECTED
HYALINE CASTS UR QL AUTO: 0 /LPF
INFLUENZA A (SUBTYPES H1,H1-2009,H3): NOT DETECTED
KETONES UR QL STRIP: NEGATIVE
LEUKOCYTE ESTERASE UR QL STRIP: NEGATIVE
MICROSCOPIC COMMENT: ABNORMAL
MYCOPLASMA PNEUMONIAE: NOT DETECTED
NITRITE UR QL STRIP: NEGATIVE
PEF LLN: 7.05
PEF PRE REF: 50.2 %
PEF REF: 9.36
PH UR STRIP: 5 [PH] (ref 5–8)
PRE FEF 25 75: 2.17 L/S (ref 1.56–5.18)
PRE FET 100: 5.21 SEC
PRE FEV05 REF: 52.7 %
PRE FEV1 FVC: 85.46 % (ref 66.03–87.99)
PRE FEV1: 1.89 L (ref 2.78–4.49)
PRE FEV5: 1.5 L (ref 1.71–3.98)
PRE FVC: 2.21 L (ref 3.61–5.83)
PRE PEF: 4.7 L/S (ref 7.05–11.68)
PROT UR QL STRIP: ABNORMAL
RBC #/AREA URNS AUTO: 0 /HPF (ref 0–4)
RESPIRATORY INFECTION PANEL SOURCE: NORMAL
RSV RNA NPH QL NAA+NON-PROBE: NOT DETECTED
RV+EV RNA NPH QL NAA+NON-PROBE: NOT DETECTED
SARS-COV-2 RDRP RESP QL NAA+PROBE: NEGATIVE
SODIUM UR-SCNC: 40 MMOL/L (ref 20–250)
SP GR UR STRIP: 1.01 (ref 1–1.03)
SQUAMOUS #/AREA URNS AUTO: 0 /HPF
URN SPEC COLLECT METH UR: ABNORMAL
WBC #/AREA URNS AUTO: 1 /HPF (ref 0–5)
YEAST UR QL AUTO: ABNORMAL

## 2021-01-11 PROCEDURE — 20600001 HC STEP DOWN PRIVATE ROOM

## 2021-01-11 PROCEDURE — 63600175 PHARM REV CODE 636 W HCPCS: Performed by: NURSE PRACTITIONER

## 2021-01-11 PROCEDURE — 87798 DETECT AGENT NOS DNA AMP: CPT

## 2021-01-11 PROCEDURE — 94010 BREATHING CAPACITY TEST: ICD-10-PCS | Mod: 26,,, | Performed by: INTERNAL MEDICINE

## 2021-01-11 PROCEDURE — 3077F SYST BP >= 140 MM HG: CPT | Mod: CPTII,S$GLB,, | Performed by: PHYSICIAN ASSISTANT

## 2021-01-11 PROCEDURE — 81001 URINALYSIS AUTO W/SCOPE: CPT

## 2021-01-11 PROCEDURE — 99222 PR INITIAL HOSPITAL CARE,LEVL II: ICD-10-PCS | Mod: ,,, | Performed by: INTERNAL MEDICINE

## 2021-01-11 PROCEDURE — U0002 COVID-19 LAB TEST NON-CDC: HCPCS

## 2021-01-11 PROCEDURE — 63600175 PHARM REV CODE 636 W HCPCS: Performed by: PHYSICIAN ASSISTANT

## 2021-01-11 PROCEDURE — 3008F BODY MASS INDEX DOCD: CPT | Mod: CPTII,S$GLB,, | Performed by: PHYSICIAN ASSISTANT

## 2021-01-11 PROCEDURE — 94010 BREATHING CAPACITY TEST: CPT | Mod: 26,,, | Performed by: INTERNAL MEDICINE

## 2021-01-11 PROCEDURE — 71046 X-RAY EXAM CHEST 2 VIEWS: CPT | Mod: 26,,, | Performed by: RADIOLOGY

## 2021-01-11 PROCEDURE — 3078F DIAST BP <80 MM HG: CPT | Mod: CPTII,S$GLB,, | Performed by: PHYSICIAN ASSISTANT

## 2021-01-11 PROCEDURE — 3008F PR BODY MASS INDEX (BMI) DOCUMENTED: ICD-10-PCS | Mod: CPTII,S$GLB,, | Performed by: PHYSICIAN ASSISTANT

## 2021-01-11 PROCEDURE — 3046F PR MOST RECENT HEMOGLOBIN A1C LEVEL > 9.0%: ICD-10-PCS | Mod: CPTII,S$GLB,, | Performed by: PHYSICIAN ASSISTANT

## 2021-01-11 PROCEDURE — 71046 XR CHEST PA AND LATERAL: ICD-10-PCS | Mod: 26,,, | Performed by: RADIOLOGY

## 2021-01-11 PROCEDURE — 1126F AMNT PAIN NOTED NONE PRSNT: CPT | Mod: S$GLB,,, | Performed by: PHYSICIAN ASSISTANT

## 2021-01-11 PROCEDURE — 82570 ASSAY OF URINE CREATININE: CPT

## 2021-01-11 PROCEDURE — 99999 PR PBB SHADOW E&M-EST. PATIENT-LVL IV: ICD-10-PCS | Mod: PBBFAC,,, | Performed by: PHYSICIAN ASSISTANT

## 2021-01-11 PROCEDURE — 25000003 PHARM REV CODE 250: Performed by: PHYSICIAN ASSISTANT

## 2021-01-11 PROCEDURE — 1126F PR PAIN SEVERITY QUANTIFIED, NO PAIN PRESENT: ICD-10-PCS | Mod: S$GLB,,, | Performed by: PHYSICIAN ASSISTANT

## 2021-01-11 PROCEDURE — 99499 NO LOS: ICD-10-PCS | Mod: 25,S$GLB,, | Performed by: PHYSICIAN ASSISTANT

## 2021-01-11 PROCEDURE — 3077F PR MOST RECENT SYSTOLIC BLOOD PRESSURE >= 140 MM HG: ICD-10-PCS | Mod: CPTII,S$GLB,, | Performed by: PHYSICIAN ASSISTANT

## 2021-01-11 PROCEDURE — 3078F PR MOST RECENT DIASTOLIC BLOOD PRESSURE < 80 MM HG: ICD-10-PCS | Mod: CPTII,S$GLB,, | Performed by: PHYSICIAN ASSISTANT

## 2021-01-11 PROCEDURE — 99999 PR PBB SHADOW E&M-EST. PATIENT-LVL IV: CPT | Mod: PBBFAC,,, | Performed by: PHYSICIAN ASSISTANT

## 2021-01-11 PROCEDURE — 99222 1ST HOSP IP/OBS MODERATE 55: CPT | Mod: ,,, | Performed by: INTERNAL MEDICINE

## 2021-01-11 PROCEDURE — 3046F HEMOGLOBIN A1C LEVEL >9.0%: CPT | Mod: CPTII,S$GLB,, | Performed by: PHYSICIAN ASSISTANT

## 2021-01-11 PROCEDURE — 84300 ASSAY OF URINE SODIUM: CPT

## 2021-01-11 PROCEDURE — 99499 UNLISTED E&M SERVICE: CPT | Mod: 25,S$GLB,, | Performed by: PHYSICIAN ASSISTANT

## 2021-01-11 PROCEDURE — 71046 X-RAY EXAM CHEST 2 VIEWS: CPT | Mod: TC

## 2021-01-11 RX ORDER — IBUPROFEN 200 MG
16 TABLET ORAL
Status: DISCONTINUED | OUTPATIENT
Start: 2021-01-11 | End: 2021-01-13 | Stop reason: HOSPADM

## 2021-01-11 RX ORDER — AZITHROMYCIN 250 MG/1
250 TABLET, FILM COATED ORAL
Qty: 13 TABLET | Refills: 11 | Status: ON HOLD | OUTPATIENT
Start: 2021-01-11 | End: 2022-02-14

## 2021-01-11 RX ORDER — ASPIRIN 81 MG/1
81 TABLET ORAL DAILY
Status: DISCONTINUED | OUTPATIENT
Start: 2021-01-11 | End: 2021-01-13 | Stop reason: HOSPADM

## 2021-01-11 RX ORDER — AMLODIPINE BESYLATE 5 MG/1
5 TABLET ORAL DAILY
Status: DISCONTINUED | OUTPATIENT
Start: 2021-01-11 | End: 2021-01-13 | Stop reason: HOSPADM

## 2021-01-11 RX ORDER — LEVALBUTEROL 1.25 MG/.5ML
1.25 SOLUTION, CONCENTRATE RESPIRATORY (INHALATION) 3 TIMES DAILY PRN
Status: DISCONTINUED | OUTPATIENT
Start: 2021-01-11 | End: 2021-01-13 | Stop reason: HOSPADM

## 2021-01-11 RX ORDER — MAGNESIUM SULFATE HEPTAHYDRATE 40 MG/ML
2 INJECTION, SOLUTION INTRAVENOUS
Status: DISCONTINUED | OUTPATIENT
Start: 2021-01-11 | End: 2021-01-13 | Stop reason: HOSPADM

## 2021-01-11 RX ORDER — PREDNISONE 5 MG/1
5 TABLET ORAL DAILY
Status: DISCONTINUED | OUTPATIENT
Start: 2021-01-11 | End: 2021-01-13 | Stop reason: HOSPADM

## 2021-01-11 RX ORDER — FUROSEMIDE 40 MG/1
40 TABLET ORAL DAILY PRN
Status: DISCONTINUED | OUTPATIENT
Start: 2021-01-11 | End: 2021-01-13 | Stop reason: HOSPADM

## 2021-01-11 RX ORDER — HEPARIN SODIUM 5000 [USP'U]/ML
5000 INJECTION, SOLUTION INTRAVENOUS; SUBCUTANEOUS EVERY 8 HOURS
Status: DISCONTINUED | OUTPATIENT
Start: 2021-01-11 | End: 2021-01-12

## 2021-01-11 RX ORDER — FOLIC ACID 1 MG/1
1000 TABLET ORAL DAILY
Status: DISCONTINUED | OUTPATIENT
Start: 2021-01-11 | End: 2021-01-13 | Stop reason: HOSPADM

## 2021-01-11 RX ORDER — AZITHROMYCIN 250 MG/1
250 TABLET, FILM COATED ORAL
Status: DISCONTINUED | OUTPATIENT
Start: 2021-01-11 | End: 2021-01-13 | Stop reason: HOSPADM

## 2021-01-11 RX ORDER — FAMOTIDINE 20 MG/1
20 TABLET, FILM COATED ORAL DAILY
Status: DISCONTINUED | OUTPATIENT
Start: 2021-01-12 | End: 2021-01-13 | Stop reason: HOSPADM

## 2021-01-11 RX ORDER — INSULIN DEGLUDEC 200 U/ML
40 INJECTION, SOLUTION SUBCUTANEOUS NIGHTLY
Qty: 15 ML | Refills: 11 | Status: ON HOLD | OUTPATIENT
Start: 2021-01-11 | End: 2021-01-22 | Stop reason: SDUPTHER

## 2021-01-11 RX ORDER — LOSARTAN POTASSIUM 25 MG/1
100 TABLET ORAL DAILY
Status: DISCONTINUED | OUTPATIENT
Start: 2021-01-11 | End: 2021-01-13 | Stop reason: HOSPADM

## 2021-01-11 RX ORDER — GLUCAGON 1 MG
1 KIT INJECTION
Status: DISCONTINUED | OUTPATIENT
Start: 2021-01-11 | End: 2021-01-13 | Stop reason: HOSPADM

## 2021-01-11 RX ORDER — SODIUM CHLORIDE 9 MG/ML
INJECTION, SOLUTION INTRAVENOUS CONTINUOUS
Status: ACTIVE | OUTPATIENT
Start: 2021-01-11 | End: 2021-01-12

## 2021-01-11 RX ORDER — PRAVASTATIN SODIUM 10 MG/1
20 TABLET ORAL DAILY
Status: DISCONTINUED | OUTPATIENT
Start: 2021-01-11 | End: 2021-01-13 | Stop reason: HOSPADM

## 2021-01-11 RX ORDER — INSULIN ASPART 100 [IU]/ML
0-5 INJECTION, SOLUTION INTRAVENOUS; SUBCUTANEOUS
Status: DISCONTINUED | OUTPATIENT
Start: 2021-01-11 | End: 2021-01-13 | Stop reason: HOSPADM

## 2021-01-11 RX ORDER — IBUPROFEN 200 MG
24 TABLET ORAL
Status: DISCONTINUED | OUTPATIENT
Start: 2021-01-11 | End: 2021-01-13 | Stop reason: HOSPADM

## 2021-01-11 RX ADMIN — SODIUM CHLORIDE 150 ML/HR: 0.9 INJECTION, SOLUTION INTRAVENOUS at 09:01

## 2021-01-11 RX ADMIN — INSULIN ASPART 1 UNITS: 100 INJECTION, SOLUTION INTRAVENOUS; SUBCUTANEOUS at 09:01

## 2021-01-11 RX ADMIN — SODIUM CHLORIDE 150 ML/HR: 0.9 INJECTION, SOLUTION INTRAVENOUS at 04:01

## 2021-01-11 RX ADMIN — HEPARIN SODIUM 5000 UNITS: 5000 INJECTION INTRAVENOUS; SUBCUTANEOUS at 10:01

## 2021-01-11 RX ADMIN — MAGNESIUM 64 MG (MAGNESIUM CHLORIDE) TABLET,DELAYED RELEASE 128 MG: at 09:01

## 2021-01-12 LAB
ALBUMIN SERPL BCP-MCNC: 2.8 G/DL (ref 3.5–5.2)
ALP SERPL-CCNC: 87 U/L (ref 55–135)
ALT SERPL W/O P-5'-P-CCNC: 12 U/L (ref 10–44)
ANION GAP SERPL CALC-SCNC: 10 MMOL/L (ref 8–16)
AST SERPL-CCNC: 12 U/L (ref 10–40)
BASOPHILS # BLD AUTO: 0.09 K/UL (ref 0–0.2)
BASOPHILS NFR BLD: 1 % (ref 0–1.9)
BILIRUB SERPL-MCNC: 0.3 MG/DL (ref 0.1–1)
BNP SERPL-MCNC: 158 PG/ML (ref 0–99)
BUN SERPL-MCNC: 84 MG/DL (ref 6–20)
CALCIUM SERPL-MCNC: 7.8 MG/DL (ref 8.7–10.5)
CHLORIDE SERPL-SCNC: 109 MMOL/L (ref 95–110)
CO2 SERPL-SCNC: 17 MMOL/L (ref 23–29)
CREAT SERPL-MCNC: 6.5 MG/DL (ref 0.5–1.4)
DIFFERENTIAL METHOD: ABNORMAL
EOSINOPHIL # BLD AUTO: 0.7 K/UL (ref 0–0.5)
EOSINOPHIL NFR BLD: 7.7 % (ref 0–8)
ERYTHROCYTE [DISTWIDTH] IN BLOOD BY AUTOMATED COUNT: 12.6 % (ref 11.5–14.5)
EST. GFR  (AFRICAN AMERICAN): 9.9 ML/MIN/1.73 M^2
EST. GFR  (NON AFRICAN AMERICAN): 8.6 ML/MIN/1.73 M^2
GLUCOSE SERPL-MCNC: 136 MG/DL (ref 70–110)
HCT VFR BLD AUTO: 29 % (ref 40–54)
HGB BLD-MCNC: 9.3 G/DL (ref 14–18)
IMM GRANULOCYTES # BLD AUTO: 0.05 K/UL (ref 0–0.04)
IMM GRANULOCYTES NFR BLD AUTO: 0.5 % (ref 0–0.5)
LYMPHOCYTES # BLD AUTO: 2.8 K/UL (ref 1–4.8)
LYMPHOCYTES NFR BLD: 30.1 % (ref 18–48)
MAGNESIUM SERPL-MCNC: 1.5 MG/DL (ref 1.6–2.6)
MCH RBC QN AUTO: 29 PG (ref 27–31)
MCHC RBC AUTO-ENTMCNC: 32.1 G/DL (ref 32–36)
MCV RBC AUTO: 90 FL (ref 82–98)
MONOCYTES # BLD AUTO: 0.9 K/UL (ref 0.3–1)
MONOCYTES NFR BLD: 9.7 % (ref 4–15)
NEUTROPHILS # BLD AUTO: 4.8 K/UL (ref 1.8–7.7)
NEUTROPHILS NFR BLD: 51 % (ref 38–73)
NRBC BLD-RTO: 0 /100 WBC
PLATELET # BLD AUTO: 325 K/UL (ref 150–350)
PMV BLD AUTO: 8.3 FL (ref 9.2–12.9)
POCT GLUCOSE: 193 MG/DL (ref 70–110)
POCT GLUCOSE: 266 MG/DL (ref 70–110)
POCT GLUCOSE: 274 MG/DL (ref 70–110)
POCT GLUCOSE: 280 MG/DL (ref 70–110)
POTASSIUM SERPL-SCNC: 4.3 MMOL/L (ref 3.5–5.1)
PROT SERPL-MCNC: 6.6 G/DL (ref 6–8.4)
RBC # BLD AUTO: 3.21 M/UL (ref 4.6–6.2)
SODIUM SERPL-SCNC: 136 MMOL/L (ref 136–145)
TACROLIMUS BLD-MCNC: 2.3 NG/ML (ref 5–15)
WBC # BLD AUTO: 9.38 K/UL (ref 3.9–12.7)

## 2021-01-12 PROCEDURE — 25000003 PHARM REV CODE 250: Performed by: NURSE PRACTITIONER

## 2021-01-12 PROCEDURE — 20600001 HC STEP DOWN PRIVATE ROOM

## 2021-01-12 PROCEDURE — 63600175 PHARM REV CODE 636 W HCPCS: Performed by: PHYSICIAN ASSISTANT

## 2021-01-12 PROCEDURE — 83735 ASSAY OF MAGNESIUM: CPT

## 2021-01-12 PROCEDURE — 99232 PR SUBSEQUENT HOSPITAL CARE,LEVL II: ICD-10-PCS | Mod: GC,,, | Performed by: INTERNAL MEDICINE

## 2021-01-12 PROCEDURE — 25000003 PHARM REV CODE 250: Performed by: PHYSICIAN ASSISTANT

## 2021-01-12 PROCEDURE — 36415 COLL VENOUS BLD VENIPUNCTURE: CPT

## 2021-01-12 PROCEDURE — 99232 SBSQ HOSP IP/OBS MODERATE 35: CPT | Mod: GC,,, | Performed by: INTERNAL MEDICINE

## 2021-01-12 PROCEDURE — 99222 1ST HOSP IP/OBS MODERATE 55: CPT | Mod: ,,, | Performed by: NURSE PRACTITIONER

## 2021-01-12 PROCEDURE — 80053 COMPREHEN METABOLIC PANEL: CPT

## 2021-01-12 PROCEDURE — 99222 PR INITIAL HOSPITAL CARE,LEVL II: ICD-10-PCS | Mod: ,,, | Performed by: NURSE PRACTITIONER

## 2021-01-12 PROCEDURE — 85025 COMPLETE CBC W/AUTO DIFF WBC: CPT

## 2021-01-12 PROCEDURE — C9399 UNCLASSIFIED DRUGS OR BIOLOG: HCPCS | Performed by: NURSE PRACTITIONER

## 2021-01-12 PROCEDURE — 97803 MED NUTRITION INDIV SUBSEQ: CPT

## 2021-01-12 PROCEDURE — 80197 ASSAY OF TACROLIMUS: CPT

## 2021-01-12 PROCEDURE — 83880 ASSAY OF NATRIURETIC PEPTIDE: CPT

## 2021-01-12 PROCEDURE — 86352 CELL FUNCTION ASSAY W/STIM: CPT

## 2021-01-12 PROCEDURE — 63600175 PHARM REV CODE 636 W HCPCS: Performed by: NURSE PRACTITIONER

## 2021-01-12 RX ORDER — INSULIN ASPART 100 [IU]/ML
10 INJECTION, SOLUTION INTRAVENOUS; SUBCUTANEOUS
Status: DISCONTINUED | OUTPATIENT
Start: 2021-01-12 | End: 2021-01-13 | Stop reason: HOSPADM

## 2021-01-12 RX ADMIN — AMLODIPINE BESYLATE 5 MG: 5 TABLET ORAL at 08:01

## 2021-01-12 RX ADMIN — FAMOTIDINE 20 MG: 20 TABLET, FILM COATED ORAL at 08:01

## 2021-01-12 RX ADMIN — MAGNESIUM 64 MG (MAGNESIUM CHLORIDE) TABLET,DELAYED RELEASE 128 MG: at 08:01

## 2021-01-12 RX ADMIN — PRAVASTATIN SODIUM 20 MG: 10 TABLET ORAL at 08:01

## 2021-01-12 RX ADMIN — FOLIC ACID 1000 MCG: 1 TABLET ORAL at 08:01

## 2021-01-12 RX ADMIN — SODIUM CHLORIDE 150 ML/HR: 0.9 INJECTION, SOLUTION INTRAVENOUS at 12:01

## 2021-01-12 RX ADMIN — SODIUM CHLORIDE 150 ML/HR: 0.9 INJECTION, SOLUTION INTRAVENOUS at 06:01

## 2021-01-12 RX ADMIN — INSULIN ASPART 10 UNITS: 100 INJECTION, SOLUTION INTRAVENOUS; SUBCUTANEOUS at 06:01

## 2021-01-12 RX ADMIN — INSULIN DETEMIR 30 UNITS: 100 INJECTION, SOLUTION SUBCUTANEOUS at 10:01

## 2021-01-12 RX ADMIN — INSULIN ASPART 3 UNITS: 100 INJECTION, SOLUTION INTRAVENOUS; SUBCUTANEOUS at 12:01

## 2021-01-12 RX ADMIN — INSULIN ASPART 10 UNITS: 100 INJECTION, SOLUTION INTRAVENOUS; SUBCUTANEOUS at 12:01

## 2021-01-12 RX ADMIN — ASPIRIN 81 MG: 81 TABLET, COATED ORAL at 08:01

## 2021-01-12 RX ADMIN — PREDNISONE 5 MG: 5 TABLET ORAL at 08:01

## 2021-01-12 RX ADMIN — HEPARIN SODIUM 5000 UNITS: 5000 INJECTION INTRAVENOUS; SUBCUTANEOUS at 06:01

## 2021-01-12 RX ADMIN — LOSARTAN POTASSIUM 100 MG: 25 TABLET ORAL at 08:01

## 2021-01-12 RX ADMIN — MAGNESIUM 64 MG (MAGNESIUM CHLORIDE) TABLET,DELAYED RELEASE 128 MG: at 09:01

## 2021-01-12 RX ADMIN — SODIUM CHLORIDE 150 ML/HR: 0.9 INJECTION, SOLUTION INTRAVENOUS at 04:01

## 2021-01-12 RX ADMIN — INSULIN ASPART 1 UNITS: 100 INJECTION, SOLUTION INTRAVENOUS; SUBCUTANEOUS at 10:01

## 2021-01-13 ENCOUNTER — PATIENT MESSAGE (OUTPATIENT)
Dept: NEPHROLOGY | Facility: CLINIC | Age: 61
End: 2021-01-13

## 2021-01-13 VITALS
DIASTOLIC BLOOD PRESSURE: 92 MMHG | RESPIRATION RATE: 20 BRPM | HEIGHT: 71 IN | HEART RATE: 96 BPM | TEMPERATURE: 98 F | OXYGEN SATURATION: 99 % | BODY MASS INDEX: 34.58 KG/M2 | WEIGHT: 247 LBS | SYSTOLIC BLOOD PRESSURE: 156 MMHG

## 2021-01-13 LAB
ALBUMIN SERPL BCP-MCNC: 3.3 G/DL (ref 3.5–5.2)
ALP SERPL-CCNC: 101 U/L (ref 55–135)
ALT SERPL W/O P-5'-P-CCNC: 15 U/L (ref 10–44)
ANION GAP SERPL CALC-SCNC: 12 MMOL/L (ref 8–16)
ASCENDING AORTA: 3.52 CM
AST SERPL-CCNC: 14 U/L (ref 10–40)
AV INDEX (PROSTH): 0.98
AV MEAN GRADIENT: 4 MMHG
AV PEAK GRADIENT: 7 MMHG
AV VALVE AREA: 4.09 CM2
AV VELOCITY RATIO: 0.86
BASOPHILS # BLD AUTO: 0.12 K/UL (ref 0–0.2)
BASOPHILS NFR BLD: 0.9 % (ref 0–1.9)
BILIRUB SERPL-MCNC: 0.3 MG/DL (ref 0.1–1)
BSA FOR ECHO PROCEDURE: 2.37 M2
BUN SERPL-MCNC: 76 MG/DL (ref 6–20)
CALCIUM SERPL-MCNC: 8.6 MG/DL (ref 8.7–10.5)
CHLORIDE SERPL-SCNC: 109 MMOL/L (ref 95–110)
CO2 SERPL-SCNC: 16 MMOL/L (ref 23–29)
CREAT SERPL-MCNC: 6.4 MG/DL (ref 0.5–1.4)
CV ECHO LV RWT: 0.31 CM
DIFFERENTIAL METHOD: ABNORMAL
DOP CALC AO PEAK VEL: 1.36 M/S
DOP CALC AO VTI: 23.12 CM
DOP CALC LVOT AREA: 4.2 CM2
DOP CALC LVOT DIAMETER: 2.3 CM
DOP CALC LVOT PEAK VEL: 1.17 M/S
DOP CALC LVOT STROKE VOLUME: 94.47 CM3
DOP CALCLVOT PEAK VEL VTI: 22.75 CM
E WAVE DECELERATION TIME: 120.04 MSEC
E/A RATIO: 1.26
E/E' RATIO: 7.92 M/S
ECHO LV POSTERIOR WALL: 0.88 CM (ref 0.6–1.1)
EOSINOPHIL # BLD AUTO: 1.1 K/UL (ref 0–0.5)
EOSINOPHIL NFR BLD: 8.2 % (ref 0–8)
ERYTHROCYTE [DISTWIDTH] IN BLOOD BY AUTOMATED COUNT: 12.6 % (ref 11.5–14.5)
EST. GFR  (AFRICAN AMERICAN): 10.1 ML/MIN/1.73 M^2
EST. GFR  (NON AFRICAN AMERICAN): 8.8 ML/MIN/1.73 M^2
FRACTIONAL SHORTENING: 32 % (ref 28–44)
GLUCOSE SERPL-MCNC: 119 MG/DL (ref 70–110)
HCT VFR BLD AUTO: 35.2 % (ref 40–54)
HGB BLD-MCNC: 10.9 G/DL (ref 14–18)
IMM GRANULOCYTES # BLD AUTO: 0.11 K/UL (ref 0–0.04)
IMM GRANULOCYTES NFR BLD AUTO: 0.9 % (ref 0–0.5)
INTERVENTRICULAR SEPTUM: 0.98 CM (ref 0.6–1.1)
IVRT: 62.8 MSEC
LA MAJOR: 6 CM
LA MINOR: 5.48 CM
LA WIDTH: 4.95 CM
LEFT ATRIUM SIZE: 4.55 CM
LEFT ATRIUM VOLUME INDEX MOD: 51.2 ML/M2
LEFT ATRIUM VOLUME INDEX: 47.5 ML/M2
LEFT ATRIUM VOLUME MOD: 118 CM3
LEFT ATRIUM VOLUME: 109.66 CM3
LEFT INTERNAL DIMENSION IN SYSTOLE: 3.91 CM (ref 2.1–4)
LEFT VENTRICLE DIASTOLIC VOLUME INDEX: 70.3 ML/M2
LEFT VENTRICLE DIASTOLIC VOLUME: 162.18 ML
LEFT VENTRICLE MASS INDEX: 90 G/M2
LEFT VENTRICLE SYSTOLIC VOLUME INDEX: 28.7 ML/M2
LEFT VENTRICLE SYSTOLIC VOLUME: 66.26 ML
LEFT VENTRICULAR INTERNAL DIMENSION IN DIASTOLE: 5.73 CM (ref 3.5–6)
LEFT VENTRICULAR MASS: 207.86 G
LV LATERAL E/E' RATIO: 7.36 M/S
LV SEPTAL E/E' RATIO: 8.58 M/S
LYMPHOCYTES # BLD AUTO: 4.5 K/UL (ref 1–4.8)
LYMPHOCYTES NFR BLD: 35 % (ref 18–48)
MAGNESIUM SERPL-MCNC: 1.7 MG/DL (ref 1.6–2.6)
MCH RBC QN AUTO: 28.9 PG (ref 27–31)
MCHC RBC AUTO-ENTMCNC: 31 G/DL (ref 32–36)
MCV RBC AUTO: 93 FL (ref 82–98)
MONOCYTES # BLD AUTO: 1.3 K/UL (ref 0.3–1)
MONOCYTES NFR BLD: 10.4 % (ref 4–15)
MV A" WAVE DURATION": 8.85 MSEC
MV PEAK A VEL: 0.82 M/S
MV PEAK E VEL: 1.03 M/S
NEUTROPHILS # BLD AUTO: 5.7 K/UL (ref 1.8–7.7)
NEUTROPHILS NFR BLD: 44.6 % (ref 38–73)
NRBC BLD-RTO: 0 /100 WBC
PISA TR MAX VEL: 1.65 M/S
PLATELET # BLD AUTO: 427 K/UL (ref 150–350)
PMV BLD AUTO: 8.6 FL (ref 9.2–12.9)
POCT GLUCOSE: 108 MG/DL (ref 70–110)
POCT GLUCOSE: 144 MG/DL (ref 70–110)
POCT GLUCOSE: 154 MG/DL (ref 70–110)
POTASSIUM SERPL-SCNC: 4.7 MMOL/L (ref 3.5–5.1)
PROT SERPL-MCNC: 7.8 G/DL (ref 6–8.4)
PULM VEIN S/D RATIO: 1.1
PV PEAK D VEL: 0.69 M/S
PV PEAK S VEL: 0.76 M/S
RA MAJOR: 4.9 CM
RA PRESSURE: 3 MMHG
RA WIDTH: 3.5 CM
RBC # BLD AUTO: 3.77 M/UL (ref 4.6–6.2)
RIGHT VENTRICULAR END-DIASTOLIC DIMENSION: 3.41 CM
RV TISSUE DOPPLER FREE WALL SYSTOLIC VELOCITY 1 (APICAL 4 CHAMBER VIEW): 10.76 CM/S
SINUS: 3.34 CM
SODIUM SERPL-SCNC: 137 MMOL/L (ref 136–145)
STJ: 3.22 CM
TACROLIMUS BLD-MCNC: 1.5 NG/ML (ref 5–15)
TDI LATERAL: 0.14 M/S
TDI SEPTAL: 0.12 M/S
TDI: 0.13 M/S
TR MAX PG: 11 MMHG
TRICUSPID ANNULAR PLANE SYSTOLIC EXCURSION: 1.2 CM
TV REST PULMONARY ARTERY PRESSURE: 14 MMHG
WBC # BLD AUTO: 12.84 K/UL (ref 3.9–12.7)

## 2021-01-13 PROCEDURE — 80197 ASSAY OF TACROLIMUS: CPT

## 2021-01-13 PROCEDURE — 87305 ASPERGILLUS AG IA: CPT

## 2021-01-13 PROCEDURE — 87205 SMEAR GRAM STAIN: CPT

## 2021-01-13 PROCEDURE — 88313 SPECIAL STAINS GROUP 2: CPT | Mod: 26,,, | Performed by: PATHOLOGY

## 2021-01-13 PROCEDURE — 80053 COMPREHEN METABOLIC PANEL: CPT

## 2021-01-13 PROCEDURE — 31623 DX BRONCHOSCOPE/BRUSH: CPT | Performed by: INTERNAL MEDICINE

## 2021-01-13 PROCEDURE — 27201011 HC FORCEPS DISPOSABLE: Performed by: INTERNAL MEDICINE

## 2021-01-13 PROCEDURE — 99153 MOD SED SAME PHYS/QHP EA: CPT | Performed by: INTERNAL MEDICINE

## 2021-01-13 PROCEDURE — 87206 SMEAR FLUORESCENT/ACID STAI: CPT | Mod: 91

## 2021-01-13 PROCEDURE — 88305 TISSUE EXAM BY PATHOLOGIST: CPT | Mod: 26,,, | Performed by: PATHOLOGY

## 2021-01-13 PROCEDURE — 87116 MYCOBACTERIA CULTURE: CPT

## 2021-01-13 PROCEDURE — 31624 DX BRONCHOSCOPE/LAVAGE: CPT | Mod: 59,RT,, | Performed by: INTERNAL MEDICINE

## 2021-01-13 PROCEDURE — 89051 BODY FLUID CELL COUNT: CPT

## 2021-01-13 PROCEDURE — 87798 DETECT AGENT NOS DNA AMP: CPT

## 2021-01-13 PROCEDURE — 63600175 PHARM REV CODE 636 W HCPCS: Performed by: INTERNAL MEDICINE

## 2021-01-13 PROCEDURE — 87102 FUNGUS ISOLATION CULTURE: CPT

## 2021-01-13 PROCEDURE — 63700000 PHARM REV CODE 250 ALT 637 W/O HCPCS: Performed by: PHYSICIAN ASSISTANT

## 2021-01-13 PROCEDURE — 87641 MR-STAPH DNA AMP PROBE: CPT

## 2021-01-13 PROCEDURE — 31623 PR BRONCHOSCOPY,DIAGNOSTIC W BRUSH: ICD-10-PCS | Mod: 59,LT,, | Performed by: INTERNAL MEDICINE

## 2021-01-13 PROCEDURE — 31628 BRONCHOSCOPY/LUNG BX EACH: CPT | Mod: RT,,, | Performed by: INTERNAL MEDICINE

## 2021-01-13 PROCEDURE — 88312 SPECIAL STAINS GROUP 1: CPT | Performed by: PATHOLOGY

## 2021-01-13 PROCEDURE — 25000003 PHARM REV CODE 250: Performed by: PHYSICIAN ASSISTANT

## 2021-01-13 PROCEDURE — 99152 MOD SED SAME PHYS/QHP 5/>YRS: CPT | Performed by: INTERNAL MEDICINE

## 2021-01-13 PROCEDURE — 25000003 PHARM REV CODE 250: Performed by: INTERNAL MEDICINE

## 2021-01-13 PROCEDURE — 88312 PR  SPECIAL STAINS,GROUP I: ICD-10-PCS | Mod: 26,,, | Performed by: PATHOLOGY

## 2021-01-13 PROCEDURE — 88312 SPECIAL STAINS GROUP 1: CPT | Mod: 26,,, | Performed by: PATHOLOGY

## 2021-01-13 PROCEDURE — 88313 PR  SPECIAL STAINS,GROUP II: ICD-10-PCS | Mod: 26,,, | Performed by: PATHOLOGY

## 2021-01-13 PROCEDURE — 83735 ASSAY OF MAGNESIUM: CPT

## 2021-01-13 PROCEDURE — 88305 TISSUE EXAM BY PATHOLOGIST: CPT | Performed by: PATHOLOGY

## 2021-01-13 PROCEDURE — 36415 COLL VENOUS BLD VENIPUNCTURE: CPT

## 2021-01-13 PROCEDURE — 31624 DX BRONCHOSCOPE/LAVAGE: CPT | Performed by: INTERNAL MEDICINE

## 2021-01-13 PROCEDURE — 31624 PR BRONCHOSCOPY,DIAG2STIC W LAVAGE: ICD-10-PCS | Mod: 59,RT,, | Performed by: INTERNAL MEDICINE

## 2021-01-13 PROCEDURE — 31623 DX BRONCHOSCOPE/BRUSH: CPT | Mod: 59,LT,, | Performed by: INTERNAL MEDICINE

## 2021-01-13 PROCEDURE — 85025 COMPLETE CBC W/AUTO DIFF WBC: CPT

## 2021-01-13 PROCEDURE — 88305 TISSUE EXAM BY PATHOLOGIST: ICD-10-PCS | Mod: 26,,, | Performed by: PATHOLOGY

## 2021-01-13 PROCEDURE — 31628 PR BRONCHOSCOPY,TRANSBRONCH BIOPSY: ICD-10-PCS | Mod: RT,,, | Performed by: INTERNAL MEDICINE

## 2021-01-13 PROCEDURE — 31628 BRONCHOSCOPY/LUNG BX EACH: CPT | Performed by: INTERNAL MEDICINE

## 2021-01-13 PROCEDURE — 88313 SPECIAL STAINS GROUP 2: CPT | Performed by: PATHOLOGY

## 2021-01-13 PROCEDURE — 63600175 PHARM REV CODE 636 W HCPCS: Performed by: PHYSICIAN ASSISTANT

## 2021-01-13 PROCEDURE — 87070 CULTURE OTHR SPECIMN AEROBIC: CPT | Mod: 59

## 2021-01-13 PROCEDURE — 87015 SPECIMEN INFECT AGNT CONCNTJ: CPT | Mod: 59

## 2021-01-13 RX ORDER — SODIUM CHLORIDE 9 MG/ML
INJECTION, SOLUTION INTRAVENOUS CONTINUOUS
Status: DISCONTINUED | OUTPATIENT
Start: 2021-01-13 | End: 2021-01-13

## 2021-01-13 RX ORDER — LIDOCAINE HYDROCHLORIDE 20 MG/ML
INJECTION, SOLUTION INFILTRATION; PERINEURAL CODE/TRAUMA/SEDATION MEDICATION
Status: COMPLETED | OUTPATIENT
Start: 2021-01-13 | End: 2021-01-13

## 2021-01-13 RX ORDER — LIDOCAINE HYDROCHLORIDE 10 MG/ML
INJECTION INFILTRATION; PERINEURAL CODE/TRAUMA/SEDATION MEDICATION
Status: COMPLETED | OUTPATIENT
Start: 2021-01-13 | End: 2021-01-13

## 2021-01-13 RX ORDER — MIDAZOLAM HYDROCHLORIDE 5 MG/ML
INJECTION INTRAMUSCULAR; INTRAVENOUS CODE/TRAUMA/SEDATION MEDICATION
Status: COMPLETED | OUTPATIENT
Start: 2021-01-13 | End: 2021-01-13

## 2021-01-13 RX ORDER — LIDOCAINE HYDROCHLORIDE 20 MG/ML
SOLUTION OROPHARYNGEAL CODE/TRAUMA/SEDATION MEDICATION
Status: COMPLETED | OUTPATIENT
Start: 2021-01-13 | End: 2021-01-13

## 2021-01-13 RX ORDER — FENTANYL CITRATE 50 UG/ML
INJECTION, SOLUTION INTRAMUSCULAR; INTRAVENOUS CODE/TRAUMA/SEDATION MEDICATION
Status: COMPLETED | OUTPATIENT
Start: 2021-01-13 | End: 2021-01-13

## 2021-01-13 RX ADMIN — ASPIRIN 81 MG: 81 TABLET, COATED ORAL at 08:01

## 2021-01-13 RX ADMIN — TOPICAL ANESTHETIC 0.5 ML: 200 SPRAY DENTAL; PERIODONTAL at 09:01

## 2021-01-13 RX ADMIN — LIDOCAINE HYDROCHLORIDE 4 ML: 20 INJECTION, SOLUTION INFILTRATION; PERINEURAL at 09:01

## 2021-01-13 RX ADMIN — INSULIN ASPART 10 UNITS: 100 INJECTION, SOLUTION INTRAVENOUS; SUBCUTANEOUS at 12:01

## 2021-01-13 RX ADMIN — LOSARTAN POTASSIUM 100 MG: 25 TABLET ORAL at 08:01

## 2021-01-13 RX ADMIN — FENTANYL CITRATE 25 MCG: 50 INJECTION, SOLUTION INTRAMUSCULAR; INTRAVENOUS at 09:01

## 2021-01-13 RX ADMIN — FAMOTIDINE 20 MG: 20 TABLET, FILM COATED ORAL at 08:01

## 2021-01-13 RX ADMIN — PREDNISONE 5 MG: 5 TABLET ORAL at 08:01

## 2021-01-13 RX ADMIN — LIDOCAINE HYDROCHLORIDE 5 ML: 20 SOLUTION OROPHARYNGEAL at 09:01

## 2021-01-13 RX ADMIN — AMLODIPINE BESYLATE 5 MG: 5 TABLET ORAL at 08:01

## 2021-01-13 RX ADMIN — LIDOCAINE HYDROCHLORIDE 8 ML: 10 INJECTION, SOLUTION INFILTRATION; PERINEURAL at 09:01

## 2021-01-13 RX ADMIN — FOLIC ACID 1000 MCG: 1 TABLET ORAL at 08:01

## 2021-01-13 RX ADMIN — PRAVASTATIN SODIUM 20 MG: 10 TABLET ORAL at 08:01

## 2021-01-13 RX ADMIN — FENTANYL CITRATE 50 MCG: 50 INJECTION, SOLUTION INTRAMUSCULAR; INTRAVENOUS at 09:01

## 2021-01-13 RX ADMIN — MAGNESIUM 64 MG (MAGNESIUM CHLORIDE) TABLET,DELAYED RELEASE 128 MG: at 08:01

## 2021-01-13 RX ADMIN — MIDAZOLAM 2 MG: 5 INJECTION INTRAMUSCULAR; INTRAVENOUS at 09:01

## 2021-01-13 RX ADMIN — AZITHROMYCIN MONOHYDRATE 250 MG: 250 TABLET ORAL at 04:01

## 2021-01-14 ENCOUNTER — TELEPHONE (OUTPATIENT)
Dept: NEPHROLOGY | Facility: CLINIC | Age: 61
End: 2021-01-14

## 2021-01-14 DIAGNOSIS — D50.8 OTHER IRON DEFICIENCY ANEMIA: ICD-10-CM

## 2021-01-14 DIAGNOSIS — N18.5 CKD (CHRONIC KIDNEY DISEASE) STAGE 5, GFR LESS THAN 15 ML/MIN: ICD-10-CM

## 2021-01-14 DIAGNOSIS — N18.4 CKD (CHRONIC KIDNEY DISEASE) STAGE 4, GFR 15-29 ML/MIN: Primary | ICD-10-CM

## 2021-01-14 LAB
APPEARANCE FLD: NORMAL
BODY FLD TYPE: NORMAL
BODY FLUID COMMENTS: NORMAL
COLOR FLD: COLORLESS
EOSINOPHIL NFR FLD MANUAL: 1 %
FINAL PATHOLOGIC DIAGNOSIS: NORMAL
GROSS: NORMAL
LYMPHOCYTES NFR FLD MANUAL: 10 %
Lab: NORMAL
MONOS+MACROS NFR FLD MANUAL: 84 %
NEUTROPHILS NFR FLD MANUAL: 5 %
WBC # FLD: 5 /CU MM

## 2021-01-15 ENCOUNTER — TELEPHONE (OUTPATIENT)
Dept: NEPHROLOGY | Facility: CLINIC | Age: 61
End: 2021-01-15

## 2021-01-15 LAB
BACTERIA SPEC AEROBE CULT: NORMAL
BACTERIA SPEC AEROBE CULT: NORMAL
CMV T CELL IMMUNITY: NORMAL
GALACTOMANNAN AG SPEC-ACNC: <0.5 INDEX
GRAM STN SPEC: NORMAL

## 2021-01-19 ENCOUNTER — TELEPHONE (OUTPATIENT)
Dept: NEPHROLOGY | Facility: CLINIC | Age: 61
End: 2021-01-19

## 2021-01-19 ENCOUNTER — PATIENT MESSAGE (OUTPATIENT)
Dept: TRANSPLANT | Facility: CLINIC | Age: 61
End: 2021-01-19

## 2021-01-19 PROBLEM — T86.810 ACUTE REJECTION OF LUNG TRANSPLANT: Status: ACTIVE | Noted: 2021-01-19

## 2021-01-19 LAB
CMV DNA # SPEC NAA+PROBE: <390 CPY/ML
CMV DNA SPEC NAA+PROBE-ACNC: <227 IU/ML
CMV DNA SPEC NAA+PROBE-LOG#: <2.6 LOG CPY/ML
CMV GENE MUT DET ISLT: NOT DETECTED
CYTOMEGALOVIRUS QUANT BY PCR LOG IU/ML (BAL): <2.4 LOG IU/ML
ENTEROVIRUS/RHINOVIRUS: NOT DETECTED
HUMAN BOCAVIRUS: NOT DETECTED
HUMAN CORONAVIRUS, COMMON COLD VIRUS: NOT DETECTED
INFLUENZA A - H1N1-09: NOT DETECTED
LEGIONELLA PNEUMOPHILA: NOT DETECTED
MORAXELLA CATARRHALIS: NOT DETECTED
PARAINFLUENZA: NOT DETECTED
RVP - ADENOVIRUS: NOT DETECTED
RVP - HUMAN METAPNEUMOVIRUS (HMPV): NOT DETECTED
RVP - INFLUENZA A: NOT DETECTED
RVP - INFLUENZA B: NOT DETECTED
RVP - RESPIRATORY SYNCTIAL VIRUS (RSV) A: NOT DETECTED
RVP - RESPIRATORY VIRAL PANEL, SOURCE: NORMAL
SPECIMEN SOURCE: NORMAL
TEM - ACINETOBACTER BAUMANNII: NOT DETECTED
TEM - BORDETELLA PERTUSSIS: NOT DETECTED
TEM - CHLAMYDOPHILA PNEUMONIAE: NOT DETECTED
TEM - KLEBSIELLA PNEUMONIAE: NOT DETECTED
TEM - MRSA: NOT DETECTED
TEM - MYCOPLASMA PNEUMONIAE: NOT DETECTED
TEM - NEISSERIA MENINGITIDIS: NOT DETECTED
TEM - PANTON-VALENTINE: NOT DETECTED
TEM - PSEUDOMONAS AERUGINOSA: NOT DETECTED
TEM - STAPHYLOCOCCUS AUREUS: NOT DETECTED
TEM - STREPTOCOCCUS PNEUMONIAE: NOT DETECTED
TEM - STREPTOCOCCUS PYOGENES A: NOT DETECTED
TEM- HAEMOPHILUS INFLUENZAE B: NOT DETECTED
TEM- HAEMOPHILUS INFLUENZAE: NOT DETECTED

## 2021-01-20 ENCOUNTER — HOSPITAL ENCOUNTER (INPATIENT)
Facility: HOSPITAL | Age: 61
LOS: 2 days | Discharge: HOME | DRG: 206 | End: 2021-01-22
Attending: INTERNAL MEDICINE | Admitting: INTERNAL MEDICINE
Payer: MEDICARE

## 2021-01-20 DIAGNOSIS — T86.810 ACUTE REJECTION OF LUNG TRANSPLANT: Primary | ICD-10-CM

## 2021-01-20 DIAGNOSIS — D84.9 IMMUNOSUPPRESSION: ICD-10-CM

## 2021-01-20 DIAGNOSIS — E11.22 CKD STAGE 4 DUE TO TYPE 2 DIABETES MELLITUS: Chronic | ICD-10-CM

## 2021-01-20 DIAGNOSIS — Z79.4 TYPE 2 DIABETES MELLITUS WITH HYPERGLYCEMIA, WITH LONG-TERM CURRENT USE OF INSULIN: Chronic | ICD-10-CM

## 2021-01-20 DIAGNOSIS — T38.0X5A ADVERSE EFFECT OF CORTICOSTEROIDS, INITIAL ENCOUNTER: ICD-10-CM

## 2021-01-20 DIAGNOSIS — E11.65 TYPE 2 DIABETES MELLITUS WITH HYPERGLYCEMIA, WITH LONG-TERM CURRENT USE OF INSULIN: Chronic | ICD-10-CM

## 2021-01-20 DIAGNOSIS — N18.30 CHRONIC KIDNEY DISEASE (CKD), STAGE III (MODERATE): ICD-10-CM

## 2021-01-20 DIAGNOSIS — N18.4 CKD STAGE 4 DUE TO TYPE 2 DIABETES MELLITUS: Chronic | ICD-10-CM

## 2021-01-20 LAB
ALBUMIN SERPL BCP-MCNC: 3 G/DL (ref 3.5–5.2)
ALP SERPL-CCNC: 91 U/L (ref 55–135)
ALT SERPL W/O P-5'-P-CCNC: 13 U/L (ref 10–44)
ANION GAP SERPL CALC-SCNC: 9 MMOL/L (ref 8–16)
AST SERPL-CCNC: 15 U/L (ref 10–40)
BASOPHILS # BLD AUTO: 0.21 K/UL (ref 0–0.2)
BASOPHILS NFR BLD: 1.7 % (ref 0–1.9)
BILIRUB SERPL-MCNC: 0.3 MG/DL (ref 0.1–1)
BUN SERPL-MCNC: 51 MG/DL (ref 6–20)
CALCIUM SERPL-MCNC: 9 MG/DL (ref 8.7–10.5)
CHLORIDE SERPL-SCNC: 109 MMOL/L (ref 95–110)
CO2 SERPL-SCNC: 20 MMOL/L (ref 23–29)
CREAT SERPL-MCNC: 6.7 MG/DL (ref 0.5–1.4)
DIFFERENTIAL METHOD: ABNORMAL
EOSINOPHIL # BLD AUTO: 1.1 K/UL (ref 0–0.5)
EOSINOPHIL NFR BLD: 8.8 % (ref 0–8)
ERYTHROCYTE [DISTWIDTH] IN BLOOD BY AUTOMATED COUNT: 12.8 % (ref 11.5–14.5)
EST. GFR  (AFRICAN AMERICAN): 9.6 ML/MIN/1.73 M^2
EST. GFR  (NON AFRICAN AMERICAN): 8.3 ML/MIN/1.73 M^2
GLUCOSE SERPL-MCNC: 186 MG/DL (ref 70–110)
HCT VFR BLD AUTO: 32.6 % (ref 40–54)
HGB BLD-MCNC: 10 G/DL (ref 14–18)
IMM GRANULOCYTES # BLD AUTO: 0.2 K/UL (ref 0–0.04)
IMM GRANULOCYTES NFR BLD AUTO: 1.6 % (ref 0–0.5)
LYMPHOCYTES # BLD AUTO: 1.8 K/UL (ref 1–4.8)
LYMPHOCYTES NFR BLD: 14.6 % (ref 18–48)
MCH RBC QN AUTO: 28.7 PG (ref 27–31)
MCHC RBC AUTO-ENTMCNC: 30.7 G/DL (ref 32–36)
MCV RBC AUTO: 94 FL (ref 82–98)
MONOCYTES # BLD AUTO: 1.1 K/UL (ref 0.3–1)
MONOCYTES NFR BLD: 9.1 % (ref 4–15)
NEUTROPHILS # BLD AUTO: 8 K/UL (ref 1.8–7.7)
NEUTROPHILS NFR BLD: 64.2 % (ref 38–73)
NRBC BLD-RTO: 0 /100 WBC
PLATELET # BLD AUTO: 373 K/UL (ref 150–350)
PMV BLD AUTO: 8.5 FL (ref 9.2–12.9)
POCT GLUCOSE: 215 MG/DL (ref 70–110)
POCT GLUCOSE: 329 MG/DL (ref 70–110)
POCT GLUCOSE: 467 MG/DL (ref 70–110)
POCT GLUCOSE: >500 MG/DL (ref 70–110)
POTASSIUM SERPL-SCNC: 5.2 MMOL/L (ref 3.5–5.1)
PROT SERPL-MCNC: 7.4 G/DL (ref 6–8.4)
RBC # BLD AUTO: 3.48 M/UL (ref 4.6–6.2)
SARS-COV-2 RDRP RESP QL NAA+PROBE: NEGATIVE
SODIUM SERPL-SCNC: 138 MMOL/L (ref 136–145)
WBC # BLD AUTO: 12.5 K/UL (ref 3.9–12.7)

## 2021-01-20 PROCEDURE — 99222 PR INITIAL HOSPITAL CARE,LEVL II: ICD-10-PCS | Mod: ,,, | Performed by: NURSE PRACTITIONER

## 2021-01-20 PROCEDURE — 36415 COLL VENOUS BLD VENIPUNCTURE: CPT

## 2021-01-20 PROCEDURE — 99222 1ST HOSP IP/OBS MODERATE 55: CPT | Mod: ,,, | Performed by: NURSE PRACTITIONER

## 2021-01-20 PROCEDURE — 63600175 PHARM REV CODE 636 W HCPCS: Performed by: PHYSICIAN ASSISTANT

## 2021-01-20 PROCEDURE — 20600001 HC STEP DOWN PRIVATE ROOM

## 2021-01-20 PROCEDURE — 85025 COMPLETE CBC W/AUTO DIFF WBC: CPT

## 2021-01-20 PROCEDURE — 63600175 PHARM REV CODE 636 W HCPCS: Performed by: NURSE PRACTITIONER

## 2021-01-20 PROCEDURE — 63700000 PHARM REV CODE 250 ALT 637 W/O HCPCS: Performed by: PHYSICIAN ASSISTANT

## 2021-01-20 PROCEDURE — 25000003 PHARM REV CODE 250: Performed by: PHYSICIAN ASSISTANT

## 2021-01-20 PROCEDURE — 80053 COMPREHEN METABOLIC PANEL: CPT

## 2021-01-20 PROCEDURE — 25000003 PHARM REV CODE 250: Performed by: NURSE PRACTITIONER

## 2021-01-20 PROCEDURE — U0002 COVID-19 LAB TEST NON-CDC: HCPCS

## 2021-01-20 RX ORDER — INSULIN ASPART 100 [IU]/ML
0-5 INJECTION, SOLUTION INTRAVENOUS; SUBCUTANEOUS
Status: DISCONTINUED | OUTPATIENT
Start: 2021-01-20 | End: 2021-01-20

## 2021-01-20 RX ORDER — ENOXAPARIN SODIUM 100 MG/ML
30 INJECTION SUBCUTANEOUS EVERY 24 HOURS
Status: DISCONTINUED | OUTPATIENT
Start: 2021-01-20 | End: 2021-01-22 | Stop reason: HOSPADM

## 2021-01-20 RX ORDER — AZITHROMYCIN 250 MG/1
250 TABLET, FILM COATED ORAL
Status: DISCONTINUED | OUTPATIENT
Start: 2021-01-20 | End: 2021-01-22 | Stop reason: HOSPADM

## 2021-01-20 RX ORDER — MAGNESIUM SULFATE HEPTAHYDRATE 40 MG/ML
2 INJECTION, SOLUTION INTRAVENOUS
Status: DISCONTINUED | OUTPATIENT
Start: 2021-01-20 | End: 2021-01-22 | Stop reason: HOSPADM

## 2021-01-20 RX ORDER — ACETAMINOPHEN 325 MG/1
650 TABLET ORAL EVERY 6 HOURS PRN
Status: DISCONTINUED | OUTPATIENT
Start: 2021-01-20 | End: 2021-01-22 | Stop reason: HOSPADM

## 2021-01-20 RX ORDER — INSULIN ASPART 100 [IU]/ML
14 INJECTION, SOLUTION INTRAVENOUS; SUBCUTANEOUS
Status: DISCONTINUED | OUTPATIENT
Start: 2021-01-20 | End: 2021-01-20

## 2021-01-20 RX ORDER — DEXTROSE MONOHYDRATE 100 MG/ML
INJECTION, SOLUTION INTRAVENOUS
Status: DISCONTINUED | OUTPATIENT
Start: 2021-01-21 | End: 2021-01-21

## 2021-01-20 RX ORDER — IBUPROFEN 200 MG
16 TABLET ORAL
Status: DISCONTINUED | OUTPATIENT
Start: 2021-01-20 | End: 2021-01-21

## 2021-01-20 RX ORDER — AMLODIPINE BESYLATE 5 MG/1
5 TABLET ORAL DAILY
Status: DISCONTINUED | OUTPATIENT
Start: 2021-01-20 | End: 2021-01-21

## 2021-01-20 RX ORDER — LOSARTAN POTASSIUM 50 MG/1
100 TABLET ORAL DAILY
Status: DISCONTINUED | OUTPATIENT
Start: 2021-01-20 | End: 2021-01-21

## 2021-01-20 RX ORDER — FUROSEMIDE 40 MG/1
40 TABLET ORAL DAILY PRN
Status: DISCONTINUED | OUTPATIENT
Start: 2021-01-20 | End: 2021-01-22 | Stop reason: HOSPADM

## 2021-01-20 RX ORDER — FOLIC ACID 1 MG/1
1000 TABLET ORAL DAILY
Status: DISCONTINUED | OUTPATIENT
Start: 2021-01-20 | End: 2021-01-22 | Stop reason: HOSPADM

## 2021-01-20 RX ORDER — IBUPROFEN 200 MG
24 TABLET ORAL
Status: DISCONTINUED | OUTPATIENT
Start: 2021-01-20 | End: 2021-01-21

## 2021-01-20 RX ORDER — ASPIRIN 81 MG/1
81 TABLET ORAL DAILY
Status: DISCONTINUED | OUTPATIENT
Start: 2021-01-20 | End: 2021-01-22 | Stop reason: HOSPADM

## 2021-01-20 RX ORDER — GLUCAGON 1 MG
1 KIT INJECTION
Status: DISCONTINUED | OUTPATIENT
Start: 2021-01-20 | End: 2021-01-21

## 2021-01-20 RX ORDER — FAMOTIDINE 20 MG/1
20 TABLET, FILM COATED ORAL 2 TIMES DAILY
Status: DISCONTINUED | OUTPATIENT
Start: 2021-01-20 | End: 2021-01-22 | Stop reason: HOSPADM

## 2021-01-20 RX ORDER — PRAVASTATIN SODIUM 10 MG/1
20 TABLET ORAL DAILY
Status: DISCONTINUED | OUTPATIENT
Start: 2021-01-20 | End: 2021-01-22 | Stop reason: HOSPADM

## 2021-01-20 RX ADMIN — INSULIN ASPART 14 UNITS: 100 INJECTION, SOLUTION INTRAVENOUS; SUBCUTANEOUS at 05:01

## 2021-01-20 RX ADMIN — AZITHROMYCIN 250 MG: 250 TABLET, FILM COATED ORAL at 05:01

## 2021-01-20 RX ADMIN — INSULIN ASPART 4 UNITS: 100 INJECTION, SOLUTION INTRAVENOUS; SUBCUTANEOUS at 05:01

## 2021-01-20 RX ADMIN — INSULIN ASPART 3 UNITS: 100 INJECTION, SOLUTION INTRAVENOUS; SUBCUTANEOUS at 11:01

## 2021-01-20 RX ADMIN — MAGNESIUM 64 MG (MAGNESIUM CHLORIDE) TABLET,DELAYED RELEASE 128 MG: at 07:01

## 2021-01-20 RX ADMIN — TACROLIMUS 1.5 MG: 1 CAPSULE ORAL at 05:01

## 2021-01-20 RX ADMIN — DEXTROSE 1500 MG: 5 SOLUTION INTRAVENOUS at 02:01

## 2021-01-20 RX ADMIN — FAMOTIDINE 20 MG: 20 TABLET, FILM COATED ORAL at 07:01

## 2021-01-20 RX ADMIN — ENOXAPARIN SODIUM 30 MG: 30 INJECTION SUBCUTANEOUS at 05:01

## 2021-01-21 LAB
ALBUMIN SERPL BCP-MCNC: 3.1 G/DL (ref 3.5–5.2)
ALBUMIN SERPL BCP-MCNC: 3.2 G/DL (ref 3.5–5.2)
ALP SERPL-CCNC: 102 U/L (ref 55–135)
ALT SERPL W/O P-5'-P-CCNC: 14 U/L (ref 10–44)
ANION GAP SERPL CALC-SCNC: 11 MMOL/L (ref 8–16)
ANION GAP SERPL CALC-SCNC: 12 MMOL/L (ref 8–16)
AST SERPL-CCNC: 15 U/L (ref 10–40)
BASOPHILS # BLD AUTO: 0.09 K/UL (ref 0–0.2)
BASOPHILS NFR BLD: 0.5 % (ref 0–1.9)
BILIRUB SERPL-MCNC: 0.4 MG/DL (ref 0.1–1)
BUN SERPL-MCNC: 64 MG/DL (ref 6–20)
BUN SERPL-MCNC: 64 MG/DL (ref 6–20)
CALCIUM SERPL-MCNC: 8.7 MG/DL (ref 8.7–10.5)
CALCIUM SERPL-MCNC: 8.8 MG/DL (ref 8.7–10.5)
CHLORIDE SERPL-SCNC: 100 MMOL/L (ref 95–110)
CHLORIDE SERPL-SCNC: 99 MMOL/L (ref 95–110)
CO2 SERPL-SCNC: 17 MMOL/L (ref 23–29)
CO2 SERPL-SCNC: 17 MMOL/L (ref 23–29)
CREAT SERPL-MCNC: 6.7 MG/DL (ref 0.5–1.4)
CREAT SERPL-MCNC: 6.7 MG/DL (ref 0.5–1.4)
DIFFERENTIAL METHOD: ABNORMAL
EOSINOPHIL # BLD AUTO: 0 K/UL (ref 0–0.5)
EOSINOPHIL NFR BLD: 0.2 % (ref 0–8)
ERYTHROCYTE [DISTWIDTH] IN BLOOD BY AUTOMATED COUNT: 12.3 % (ref 11.5–14.5)
EST. GFR  (AFRICAN AMERICAN): 9.6 ML/MIN/1.73 M^2
EST. GFR  (AFRICAN AMERICAN): 9.6 ML/MIN/1.73 M^2
EST. GFR  (NON AFRICAN AMERICAN): 8.3 ML/MIN/1.73 M^2
EST. GFR  (NON AFRICAN AMERICAN): 8.3 ML/MIN/1.73 M^2
FERRITIN SERPL-MCNC: 135 NG/ML (ref 20–300)
GLUCOSE SERPL-MCNC: 197 MG/DL (ref 70–110)
GLUCOSE SERPL-MCNC: 381 MG/DL (ref 70–110)
HCT VFR BLD AUTO: 32.9 % (ref 40–54)
HGB BLD-MCNC: 10.5 G/DL (ref 14–18)
IMM GRANULOCYTES # BLD AUTO: 0.35 K/UL (ref 0–0.04)
IMM GRANULOCYTES NFR BLD AUTO: 1.8 % (ref 0–0.5)
IRON SERPL-MCNC: 51 UG/DL (ref 45–160)
LYMPHOCYTES # BLD AUTO: 2.2 K/UL (ref 1–4.8)
LYMPHOCYTES NFR BLD: 11.3 % (ref 18–48)
MAGNESIUM SERPL-MCNC: 1.7 MG/DL (ref 1.6–2.6)
MCH RBC QN AUTO: 29.8 PG (ref 27–31)
MCHC RBC AUTO-ENTMCNC: 31.9 G/DL (ref 32–36)
MCV RBC AUTO: 94 FL (ref 82–98)
MONOCYTES # BLD AUTO: 0.3 K/UL (ref 0.3–1)
MONOCYTES NFR BLD: 1.3 % (ref 4–15)
NEUTROPHILS # BLD AUTO: 16.6 K/UL (ref 1.8–7.7)
NEUTROPHILS NFR BLD: 84.9 % (ref 38–73)
NRBC BLD-RTO: 0 /100 WBC
PHOSPHATE SERPL-MCNC: 2.7 MG/DL (ref 2.7–4.5)
PLATELET # BLD AUTO: 371 K/UL (ref 150–350)
PMV BLD AUTO: 8.7 FL (ref 9.2–12.9)
POCT GLUCOSE: 194 MG/DL (ref 70–110)
POCT GLUCOSE: 195 MG/DL (ref 70–110)
POCT GLUCOSE: 198 MG/DL (ref 70–110)
POCT GLUCOSE: 204 MG/DL (ref 70–110)
POCT GLUCOSE: 209 MG/DL (ref 70–110)
POCT GLUCOSE: 213 MG/DL (ref 70–110)
POCT GLUCOSE: 227 MG/DL (ref 70–110)
POCT GLUCOSE: 276 MG/DL (ref 70–110)
POCT GLUCOSE: 356 MG/DL (ref 70–110)
POCT GLUCOSE: 358 MG/DL (ref 70–110)
POCT GLUCOSE: 400 MG/DL (ref 70–110)
POCT GLUCOSE: 425 MG/DL (ref 70–110)
POCT GLUCOSE: 449 MG/DL (ref 70–110)
POCT GLUCOSE: 470 MG/DL (ref 70–110)
POCT GLUCOSE: >500 MG/DL (ref 70–110)
POCT GLUCOSE: >500 MG/DL (ref 70–110)
POTASSIUM SERPL-SCNC: 5.2 MMOL/L (ref 3.5–5.1)
POTASSIUM SERPL-SCNC: 5.8 MMOL/L (ref 3.5–5.1)
PROT SERPL-MCNC: 8 G/DL (ref 6–8.4)
PTH-INTACT SERPL-MCNC: 277 PG/ML (ref 9–77)
RBC # BLD AUTO: 3.52 M/UL (ref 4.6–6.2)
SATURATED IRON: 16 % (ref 20–50)
SODIUM SERPL-SCNC: 128 MMOL/L (ref 136–145)
SODIUM SERPL-SCNC: 128 MMOL/L (ref 136–145)
TACROLIMUS BLD-MCNC: 5.1 NG/ML (ref 5–15)
TOTAL IRON BINDING CAPACITY: 312 UG/DL (ref 250–450)
TRANSFERRIN SERPL-MCNC: 211 MG/DL (ref 200–375)
WBC # BLD AUTO: 19.55 K/UL (ref 3.9–12.7)

## 2021-01-21 PROCEDURE — 99233 SBSQ HOSP IP/OBS HIGH 50: CPT | Mod: GC,,, | Performed by: INTERNAL MEDICINE

## 2021-01-21 PROCEDURE — 99232 PR SUBSEQUENT HOSPITAL CARE,LEVL II: ICD-10-PCS | Mod: ,,, | Performed by: NURSE PRACTITIONER

## 2021-01-21 PROCEDURE — 25000003 PHARM REV CODE 250: Performed by: PHYSICIAN ASSISTANT

## 2021-01-21 PROCEDURE — 80197 ASSAY OF TACROLIMUS: CPT

## 2021-01-21 PROCEDURE — 63600175 PHARM REV CODE 636 W HCPCS: Performed by: NURSE PRACTITIONER

## 2021-01-21 PROCEDURE — 83735 ASSAY OF MAGNESIUM: CPT

## 2021-01-21 PROCEDURE — 83540 ASSAY OF IRON: CPT

## 2021-01-21 PROCEDURE — 63600175 PHARM REV CODE 636 W HCPCS: Performed by: PHYSICIAN ASSISTANT

## 2021-01-21 PROCEDURE — 99233 PR SUBSEQUENT HOSPITAL CARE,LEVL III: ICD-10-PCS | Mod: GC,,, | Performed by: INTERNAL MEDICINE

## 2021-01-21 PROCEDURE — 80053 COMPREHEN METABOLIC PANEL: CPT

## 2021-01-21 PROCEDURE — 80069 RENAL FUNCTION PANEL: CPT

## 2021-01-21 PROCEDURE — 82728 ASSAY OF FERRITIN: CPT

## 2021-01-21 PROCEDURE — 36415 COLL VENOUS BLD VENIPUNCTURE: CPT

## 2021-01-21 PROCEDURE — 25000003 PHARM REV CODE 250: Performed by: STUDENT IN AN ORGANIZED HEALTH CARE EDUCATION/TRAINING PROGRAM

## 2021-01-21 PROCEDURE — 97803 MED NUTRITION INDIV SUBSEQ: CPT

## 2021-01-21 PROCEDURE — 25000003 PHARM REV CODE 250: Performed by: NURSE PRACTITIONER

## 2021-01-21 PROCEDURE — 25000003 PHARM REV CODE 250: Performed by: INTERNAL MEDICINE

## 2021-01-21 PROCEDURE — 99232 SBSQ HOSP IP/OBS MODERATE 35: CPT | Mod: ,,, | Performed by: NURSE PRACTITIONER

## 2021-01-21 PROCEDURE — 85025 COMPLETE CBC W/AUTO DIFF WBC: CPT

## 2021-01-21 PROCEDURE — 83970 ASSAY OF PARATHORMONE: CPT

## 2021-01-21 PROCEDURE — 63600175 PHARM REV CODE 636 W HCPCS: Performed by: STUDENT IN AN ORGANIZED HEALTH CARE EDUCATION/TRAINING PROGRAM

## 2021-01-21 PROCEDURE — 20600001 HC STEP DOWN PRIVATE ROOM

## 2021-01-21 RX ORDER — IBUPROFEN 200 MG
16 TABLET ORAL
Status: DISCONTINUED | OUTPATIENT
Start: 2021-01-21 | End: 2021-01-22 | Stop reason: HOSPADM

## 2021-01-21 RX ORDER — INSULIN ASPART 100 [IU]/ML
0-5 INJECTION, SOLUTION INTRAVENOUS; SUBCUTANEOUS
Status: DISCONTINUED | OUTPATIENT
Start: 2021-01-21 | End: 2021-01-22 | Stop reason: HOSPADM

## 2021-01-21 RX ORDER — GLUCAGON 1 MG
1 KIT INJECTION
Status: DISCONTINUED | OUTPATIENT
Start: 2021-01-21 | End: 2021-01-22 | Stop reason: HOSPADM

## 2021-01-21 RX ORDER — CARVEDILOL 3.12 MG/1
6.25 TABLET ORAL 2 TIMES DAILY
Status: DISCONTINUED | OUTPATIENT
Start: 2021-01-21 | End: 2021-01-22 | Stop reason: HOSPADM

## 2021-01-21 RX ORDER — IBUPROFEN 200 MG
24 TABLET ORAL
Status: DISCONTINUED | OUTPATIENT
Start: 2021-01-21 | End: 2021-01-22 | Stop reason: HOSPADM

## 2021-01-21 RX ORDER — INSULIN ASPART 100 [IU]/ML
10-20 INJECTION, SOLUTION INTRAVENOUS; SUBCUTANEOUS
Status: DISCONTINUED | OUTPATIENT
Start: 2021-01-21 | End: 2021-01-22 | Stop reason: HOSPADM

## 2021-01-21 RX ORDER — AMLODIPINE BESYLATE 10 MG/1
10 TABLET ORAL DAILY
Status: DISCONTINUED | OUTPATIENT
Start: 2021-01-22 | End: 2021-01-22 | Stop reason: HOSPADM

## 2021-01-21 RX ORDER — CARVEDILOL 3.12 MG/1
6.25 TABLET ORAL 2 TIMES DAILY
Status: DISCONTINUED | OUTPATIENT
Start: 2021-01-21 | End: 2021-01-21

## 2021-01-21 RX ADMIN — ENOXAPARIN SODIUM 30 MG: 30 INJECTION SUBCUTANEOUS at 05:01

## 2021-01-21 RX ADMIN — FOLIC ACID 1000 MCG: 1 TABLET ORAL at 08:01

## 2021-01-21 RX ADMIN — ASPIRIN 81 MG: 81 TABLET, COATED ORAL at 08:01

## 2021-01-21 RX ADMIN — SODIUM CHLORIDE 4.8 UNITS/HR: 9 INJECTION, SOLUTION INTRAVENOUS at 10:01

## 2021-01-21 RX ADMIN — SODIUM CHLORIDE 2.5 UNITS/HR: 9 INJECTION, SOLUTION INTRAVENOUS at 05:01

## 2021-01-21 RX ADMIN — PRAVASTATIN SODIUM 20 MG: 10 TABLET ORAL at 08:01

## 2021-01-21 RX ADMIN — SODIUM CHLORIDE 2.4 UNITS/HR: 9 INJECTION, SOLUTION INTRAVENOUS at 10:01

## 2021-01-21 RX ADMIN — FAMOTIDINE 20 MG: 20 TABLET, FILM COATED ORAL at 08:01

## 2021-01-21 RX ADMIN — DEXTROSE 1500 MG: 5 SOLUTION INTRAVENOUS at 12:01

## 2021-01-21 RX ADMIN — INSULIN ASPART 20 UNITS: 100 INJECTION, SOLUTION INTRAVENOUS; SUBCUTANEOUS at 05:01

## 2021-01-21 RX ADMIN — INSULIN ASPART 1 UNITS: 100 INJECTION, SOLUTION INTRAVENOUS; SUBCUTANEOUS at 10:01

## 2021-01-21 RX ADMIN — MAGNESIUM 64 MG (MAGNESIUM CHLORIDE) TABLET,DELAYED RELEASE 128 MG: at 08:01

## 2021-01-21 RX ADMIN — TACROLIMUS 1.5 MG: 1 CAPSULE ORAL at 05:01

## 2021-01-21 RX ADMIN — CARVEDILOL 6.25 MG: 3.12 TABLET, FILM COATED ORAL at 12:01

## 2021-01-21 RX ADMIN — SODIUM CHLORIDE 6.5 UNITS/HR: 9 INJECTION, SOLUTION INTRAVENOUS at 07:01

## 2021-01-21 RX ADMIN — LOSARTAN POTASSIUM 100 MG: 50 TABLET, FILM COATED ORAL at 08:01

## 2021-01-21 RX ADMIN — SODIUM CHLORIDE 1.2 UNITS/HR: 9 INJECTION, SOLUTION INTRAVENOUS at 12:01

## 2021-01-21 RX ADMIN — TACROLIMUS 1.5 MG: 1 CAPSULE ORAL at 08:01

## 2021-01-21 RX ADMIN — INSULIN ASPART 1 UNITS: 100 INJECTION, SOLUTION INTRAVENOUS; SUBCUTANEOUS at 05:01

## 2021-01-21 RX ADMIN — CARVEDILOL 6.25 MG: 3.12 TABLET, FILM COATED ORAL at 08:01

## 2021-01-21 RX ADMIN — AMLODIPINE BESYLATE 5 MG: 5 TABLET ORAL at 08:01

## 2021-01-22 ENCOUNTER — TELEPHONE (OUTPATIENT)
Dept: FAMILY MEDICINE | Facility: CLINIC | Age: 61
End: 2021-01-22

## 2021-01-22 VITALS
TEMPERATURE: 98 F | WEIGHT: 245 LBS | SYSTOLIC BLOOD PRESSURE: 136 MMHG | HEIGHT: 71 IN | RESPIRATION RATE: 20 BRPM | HEART RATE: 71 BPM | DIASTOLIC BLOOD PRESSURE: 84 MMHG | BODY MASS INDEX: 34.3 KG/M2 | OXYGEN SATURATION: 95 %

## 2021-01-22 LAB
ALBUMIN SERPL BCP-MCNC: 2.9 G/DL (ref 3.5–5.2)
ALP SERPL-CCNC: 84 U/L (ref 55–135)
ALT SERPL W/O P-5'-P-CCNC: 13 U/L (ref 10–44)
ANION GAP SERPL CALC-SCNC: 11 MMOL/L (ref 8–16)
ANION GAP SERPL CALC-SCNC: 12 MMOL/L (ref 8–16)
AST SERPL-CCNC: 14 U/L (ref 10–40)
BASOPHILS # BLD AUTO: 0.03 K/UL (ref 0–0.2)
BASOPHILS NFR BLD: 0.2 % (ref 0–1.9)
BILIRUB SERPL-MCNC: 0.3 MG/DL (ref 0.1–1)
BUN SERPL-MCNC: 75 MG/DL (ref 6–20)
BUN SERPL-MCNC: 81 MG/DL (ref 6–20)
CALCIUM SERPL-MCNC: 8.2 MG/DL (ref 8.7–10.5)
CALCIUM SERPL-MCNC: 8.4 MG/DL (ref 8.7–10.5)
CHLORIDE SERPL-SCNC: 103 MMOL/L (ref 95–110)
CHLORIDE SERPL-SCNC: 103 MMOL/L (ref 95–110)
CO2 SERPL-SCNC: 16 MMOL/L (ref 23–29)
CO2 SERPL-SCNC: 16 MMOL/L (ref 23–29)
CREAT SERPL-MCNC: 6.9 MG/DL (ref 0.5–1.4)
CREAT SERPL-MCNC: 7.3 MG/DL (ref 0.5–1.4)
DIFFERENTIAL METHOD: ABNORMAL
EOSINOPHIL # BLD AUTO: 0 K/UL (ref 0–0.5)
EOSINOPHIL NFR BLD: 0 % (ref 0–8)
ERYTHROCYTE [DISTWIDTH] IN BLOOD BY AUTOMATED COUNT: 12.6 % (ref 11.5–14.5)
EST. GFR  (AFRICAN AMERICAN): 8.6 ML/MIN/1.73 M^2
EST. GFR  (AFRICAN AMERICAN): 9.2 ML/MIN/1.73 M^2
EST. GFR  (NON AFRICAN AMERICAN): 7.5 ML/MIN/1.73 M^2
EST. GFR  (NON AFRICAN AMERICAN): 8 ML/MIN/1.73 M^2
GLUCOSE SERPL-MCNC: 167 MG/DL (ref 70–110)
GLUCOSE SERPL-MCNC: 170 MG/DL (ref 70–110)
HCT VFR BLD AUTO: 27.8 % (ref 40–54)
HGB BLD-MCNC: 9.4 G/DL (ref 14–18)
IMM GRANULOCYTES # BLD AUTO: 0.23 K/UL (ref 0–0.04)
IMM GRANULOCYTES NFR BLD AUTO: 1.3 % (ref 0–0.5)
LYMPHOCYTES # BLD AUTO: 1.5 K/UL (ref 1–4.8)
LYMPHOCYTES NFR BLD: 8.1 % (ref 18–48)
MAGNESIUM SERPL-MCNC: 1.9 MG/DL (ref 1.6–2.6)
MCH RBC QN AUTO: 29.7 PG (ref 27–31)
MCHC RBC AUTO-ENTMCNC: 33.8 G/DL (ref 32–36)
MCV RBC AUTO: 88 FL (ref 82–98)
MONOCYTES # BLD AUTO: 0.5 K/UL (ref 0.3–1)
MONOCYTES NFR BLD: 2.5 % (ref 4–15)
NEUTROPHILS # BLD AUTO: 16 K/UL (ref 1.8–7.7)
NEUTROPHILS NFR BLD: 87.9 % (ref 38–73)
NRBC BLD-RTO: 0 /100 WBC
PLATELET # BLD AUTO: 338 K/UL (ref 150–350)
PMV BLD AUTO: 8.7 FL (ref 9.2–12.9)
POCT GLUCOSE: 130 MG/DL (ref 70–110)
POCT GLUCOSE: 148 MG/DL (ref 70–110)
POCT GLUCOSE: 159 MG/DL (ref 70–110)
POTASSIUM SERPL-SCNC: 5.3 MMOL/L (ref 3.5–5.1)
POTASSIUM SERPL-SCNC: 5.4 MMOL/L (ref 3.5–5.1)
PROT SERPL-MCNC: 6.8 G/DL (ref 6–8.4)
RBC # BLD AUTO: 3.17 M/UL (ref 4.6–6.2)
SODIUM SERPL-SCNC: 130 MMOL/L (ref 136–145)
SODIUM SERPL-SCNC: 131 MMOL/L (ref 136–145)
TACROLIMUS BLD-MCNC: 4.9 NG/ML (ref 5–15)
WBC # BLD AUTO: 18.16 K/UL (ref 3.9–12.7)

## 2021-01-22 PROCEDURE — 99232 PR SUBSEQUENT HOSPITAL CARE,LEVL II: ICD-10-PCS | Mod: ,,, | Performed by: NURSE PRACTITIONER

## 2021-01-22 PROCEDURE — 25000003 PHARM REV CODE 250: Performed by: NURSE PRACTITIONER

## 2021-01-22 PROCEDURE — 63600175 PHARM REV CODE 636 W HCPCS: Performed by: PHYSICIAN ASSISTANT

## 2021-01-22 PROCEDURE — 25000003 PHARM REV CODE 250: Performed by: INTERNAL MEDICINE

## 2021-01-22 PROCEDURE — 99238 PR HOSPITAL DISCHARGE DAY,<30 MIN: ICD-10-PCS | Mod: ,,, | Performed by: PHYSICIAN ASSISTANT

## 2021-01-22 PROCEDURE — 85025 COMPLETE CBC W/AUTO DIFF WBC: CPT

## 2021-01-22 PROCEDURE — 25000003 PHARM REV CODE 250: Performed by: PHYSICIAN ASSISTANT

## 2021-01-22 PROCEDURE — 80048 BASIC METABOLIC PNL TOTAL CA: CPT

## 2021-01-22 PROCEDURE — 99232 SBSQ HOSP IP/OBS MODERATE 35: CPT | Mod: ,,, | Performed by: NURSE PRACTITIONER

## 2021-01-22 PROCEDURE — 63600175 PHARM REV CODE 636 W HCPCS: Performed by: NURSE PRACTITIONER

## 2021-01-22 PROCEDURE — 99238 HOSP IP/OBS DSCHRG MGMT 30/<: CPT | Mod: ,,, | Performed by: PHYSICIAN ASSISTANT

## 2021-01-22 PROCEDURE — 36415 COLL VENOUS BLD VENIPUNCTURE: CPT

## 2021-01-22 PROCEDURE — 80197 ASSAY OF TACROLIMUS: CPT

## 2021-01-22 PROCEDURE — 83735 ASSAY OF MAGNESIUM: CPT

## 2021-01-22 PROCEDURE — 80053 COMPREHEN METABOLIC PANEL: CPT

## 2021-01-22 RX ORDER — CARVEDILOL 6.25 MG/1
6.25 TABLET ORAL 2 TIMES DAILY
Qty: 60 TABLET | Refills: 11 | Status: CANCELLED | OUTPATIENT
Start: 2021-01-22 | End: 2022-01-22

## 2021-01-22 RX ORDER — FUROSEMIDE 40 MG/1
40 TABLET ORAL DAILY PRN
Qty: 30 TABLET | Refills: 11 | Status: SHIPPED | OUTPATIENT
Start: 2021-01-22 | End: 2021-06-18

## 2021-01-22 RX ORDER — FUROSEMIDE 40 MG/1
40 TABLET ORAL DAILY PRN
Qty: 30 TABLET | Refills: 11 | Status: CANCELLED | OUTPATIENT
Start: 2021-01-22 | End: 2022-01-22

## 2021-01-22 RX ORDER — INSULIN DEGLUDEC 200 U/ML
36 INJECTION, SOLUTION SUBCUTANEOUS NIGHTLY
Qty: 15 ML | Refills: 11 | Status: ON HOLD
Start: 2021-01-22 | End: 2021-05-11 | Stop reason: SDUPTHER

## 2021-01-22 RX ORDER — AMLODIPINE BESYLATE 5 MG/1
10 TABLET ORAL DAILY
Qty: 60 TABLET | Refills: 11 | Status: CANCELLED
Start: 2021-01-22

## 2021-01-22 RX ORDER — ROSUVASTATIN CALCIUM 10 MG/1
10 TABLET, COATED ORAL DAILY
Qty: 30 TABLET | Refills: 3 | Status: SHIPPED | OUTPATIENT
Start: 2021-01-22 | End: 2021-06-09 | Stop reason: SDUPTHER

## 2021-01-22 RX ORDER — SODIUM POLYSTYRENE SULFONATE 4.1 MEQ/G
15 POWDER, FOR SUSPENSION ORAL; RECTAL DAILY PRN
Qty: 453.6 G | Refills: 2 | Status: SHIPPED | OUTPATIENT
Start: 2021-01-22 | End: 2021-04-07

## 2021-01-22 RX ORDER — FUROSEMIDE 10 MG/ML
60 INJECTION INTRAMUSCULAR; INTRAVENOUS ONCE
Status: COMPLETED | OUTPATIENT
Start: 2021-01-22 | End: 2021-01-22

## 2021-01-22 RX ADMIN — CARVEDILOL 6.25 MG: 3.12 TABLET, FILM COATED ORAL at 08:01

## 2021-01-22 RX ADMIN — FOLIC ACID 1000 MCG: 1 TABLET ORAL at 08:01

## 2021-01-22 RX ADMIN — DEXTROSE 1500 MG: 5 SOLUTION INTRAVENOUS at 08:01

## 2021-01-22 RX ADMIN — FAMOTIDINE 20 MG: 20 TABLET, FILM COATED ORAL at 08:01

## 2021-01-22 RX ADMIN — MAGNESIUM 64 MG (MAGNESIUM CHLORIDE) TABLET,DELAYED RELEASE 128 MG: at 08:01

## 2021-01-22 RX ADMIN — FUROSEMIDE 60 MG: 10 INJECTION, SOLUTION INTRAMUSCULAR; INTRAVENOUS at 10:01

## 2021-01-22 RX ADMIN — INSULIN ASPART 20 UNITS: 100 INJECTION, SOLUTION INTRAVENOUS; SUBCUTANEOUS at 08:01

## 2021-01-22 RX ADMIN — PATIROMER 8.4 G: 8.4 POWDER, FOR SUSPENSION ORAL at 02:01

## 2021-01-22 RX ADMIN — TACROLIMUS 1.5 MG: 1 CAPSULE ORAL at 08:01

## 2021-01-22 RX ADMIN — ASPIRIN 81 MG: 81 TABLET, COATED ORAL at 08:01

## 2021-01-22 RX ADMIN — INSULIN ASPART 20 UNITS: 100 INJECTION, SOLUTION INTRAVENOUS; SUBCUTANEOUS at 12:01

## 2021-01-22 RX ADMIN — AMLODIPINE BESYLATE 10 MG: 10 TABLET ORAL at 08:01

## 2021-01-22 RX ADMIN — PRAVASTATIN SODIUM 20 MG: 10 TABLET ORAL at 08:01

## 2021-01-25 ENCOUNTER — LAB VISIT (OUTPATIENT)
Dept: LAB | Facility: HOSPITAL | Age: 61
End: 2021-01-25
Attending: FAMILY MEDICINE
Payer: MEDICARE

## 2021-01-25 ENCOUNTER — OFFICE VISIT (OUTPATIENT)
Dept: NEPHROLOGY | Facility: CLINIC | Age: 61
End: 2021-01-25
Payer: MEDICARE

## 2021-01-25 VITALS
SYSTOLIC BLOOD PRESSURE: 122 MMHG | WEIGHT: 248.25 LBS | BODY MASS INDEX: 34.75 KG/M2 | TEMPERATURE: 98 F | OXYGEN SATURATION: 96 % | HEIGHT: 71 IN | HEART RATE: 97 BPM | DIASTOLIC BLOOD PRESSURE: 80 MMHG

## 2021-01-25 DIAGNOSIS — E11.65 TYPE 2 DIABETES MELLITUS WITH HYPERGLYCEMIA, WITH LONG-TERM CURRENT USE OF INSULIN: Chronic | ICD-10-CM

## 2021-01-25 DIAGNOSIS — Z94.2 LUNG REPLACED BY TRANSPLANT: ICD-10-CM

## 2021-01-25 DIAGNOSIS — I48.0 PAROXYSMAL ATRIAL FIBRILLATION: ICD-10-CM

## 2021-01-25 DIAGNOSIS — Z79.4 TYPE 2 DIABETES MELLITUS WITH HYPERGLYCEMIA, WITH LONG-TERM CURRENT USE OF INSULIN: Chronic | ICD-10-CM

## 2021-01-25 DIAGNOSIS — D86.0 SARCOIDOSIS OF LUNG: ICD-10-CM

## 2021-01-25 DIAGNOSIS — E78.2 MIXED HYPERLIPIDEMIA: ICD-10-CM

## 2021-01-25 DIAGNOSIS — J96.11 CHRONIC RESPIRATORY FAILURE WITH HYPOXIA: ICD-10-CM

## 2021-01-25 DIAGNOSIS — E78.2 COMBINED HYPERLIPIDEMIA ASSOCIATED WITH TYPE 2 DIABETES MELLITUS: Chronic | ICD-10-CM

## 2021-01-25 DIAGNOSIS — I27.29 PULMONARY HYPERTENSION ASSOCIATED WITH SARCOIDOSIS: ICD-10-CM

## 2021-01-25 DIAGNOSIS — R80.1 PERSISTENT PROTEINURIA: ICD-10-CM

## 2021-01-25 DIAGNOSIS — N18.4 CKD STAGE 4 DUE TO TYPE 2 DIABETES MELLITUS: Chronic | ICD-10-CM

## 2021-01-25 DIAGNOSIS — D86.9 PULMONARY HYPERTENSION ASSOCIATED WITH SARCOIDOSIS: ICD-10-CM

## 2021-01-25 DIAGNOSIS — N18.4 CKD (CHRONIC KIDNEY DISEASE) STAGE 4, GFR 15-29 ML/MIN: Primary | ICD-10-CM

## 2021-01-25 DIAGNOSIS — D84.9 IMMUNOSUPPRESSION: ICD-10-CM

## 2021-01-25 DIAGNOSIS — E87.5 HYPERKALEMIA: ICD-10-CM

## 2021-01-25 DIAGNOSIS — E11.22 CKD STAGE 4 DUE TO TYPE 2 DIABETES MELLITUS: Chronic | ICD-10-CM

## 2021-01-25 DIAGNOSIS — E11.59 HYPERTENSION ASSOCIATED WITH DIABETES: Chronic | ICD-10-CM

## 2021-01-25 DIAGNOSIS — T86.812 LUNG TRANSPLANT INFECTION: ICD-10-CM

## 2021-01-25 DIAGNOSIS — E78.00 PURE HYPERCHOLESTEROLEMIA: Chronic | ICD-10-CM

## 2021-01-25 DIAGNOSIS — E66.09 CLASS 1 OBESITY DUE TO EXCESS CALORIES WITH SERIOUS COMORBIDITY AND BODY MASS INDEX (BMI) OF 30.0 TO 30.9 IN ADULT: ICD-10-CM

## 2021-01-25 DIAGNOSIS — K21.9 GASTROESOPHAGEAL REFLUX DISEASE WITHOUT ESOPHAGITIS: ICD-10-CM

## 2021-01-25 DIAGNOSIS — Z94.2 LUNG TRANSPLANTED: ICD-10-CM

## 2021-01-25 DIAGNOSIS — T86.810 ACUTE REJECTION OF LUNG TRANSPLANT: ICD-10-CM

## 2021-01-25 DIAGNOSIS — N17.9 AKI (ACUTE KIDNEY INJURY): ICD-10-CM

## 2021-01-25 DIAGNOSIS — I15.2 HYPERTENSION ASSOCIATED WITH DIABETES: Chronic | ICD-10-CM

## 2021-01-25 DIAGNOSIS — E11.69 COMBINED HYPERLIPIDEMIA ASSOCIATED WITH TYPE 2 DIABETES MELLITUS: Chronic | ICD-10-CM

## 2021-01-25 LAB
ANION GAP SERPL CALC-SCNC: 13 MMOL/L (ref 8–16)
BUN SERPL-MCNC: 117 MG/DL (ref 6–20)
CALCIUM SERPL-MCNC: 8 MG/DL (ref 8.7–10.5)
CHLORIDE SERPL-SCNC: 106 MMOL/L (ref 95–110)
CO2 SERPL-SCNC: 16 MMOL/L (ref 23–29)
CREAT SERPL-MCNC: 8.1 MG/DL (ref 0.5–1.4)
EST. GFR  (AFRICAN AMERICAN): 7.6 ML/MIN/1.73 M^2
EST. GFR  (NON AFRICAN AMERICAN): 6.6 ML/MIN/1.73 M^2
GLUCOSE SERPL-MCNC: 208 MG/DL (ref 70–110)
POTASSIUM SERPL-SCNC: 4.7 MMOL/L (ref 3.5–5.1)
SODIUM SERPL-SCNC: 135 MMOL/L (ref 136–145)

## 2021-01-25 PROCEDURE — 3074F SYST BP LT 130 MM HG: CPT | Mod: CPTII,S$GLB,, | Performed by: INTERNAL MEDICINE

## 2021-01-25 PROCEDURE — 3079F PR MOST RECENT DIASTOLIC BLOOD PRESSURE 80-89 MM HG: ICD-10-PCS | Mod: CPTII,S$GLB,, | Performed by: INTERNAL MEDICINE

## 2021-01-25 PROCEDURE — 3046F PR MOST RECENT HEMOGLOBIN A1C LEVEL > 9.0%: ICD-10-PCS | Mod: CPTII,S$GLB,, | Performed by: INTERNAL MEDICINE

## 2021-01-25 PROCEDURE — 3074F PR MOST RECENT SYSTOLIC BLOOD PRESSURE < 130 MM HG: ICD-10-PCS | Mod: CPTII,S$GLB,, | Performed by: INTERNAL MEDICINE

## 2021-01-25 PROCEDURE — 99499 RISK ADDL DX/OHS AUDIT: ICD-10-PCS | Mod: S$GLB,,, | Performed by: INTERNAL MEDICINE

## 2021-01-25 PROCEDURE — 3008F PR BODY MASS INDEX (BMI) DOCUMENTED: ICD-10-PCS | Mod: CPTII,S$GLB,, | Performed by: INTERNAL MEDICINE

## 2021-01-25 PROCEDURE — 1126F AMNT PAIN NOTED NONE PRSNT: CPT | Mod: S$GLB,,, | Performed by: INTERNAL MEDICINE

## 2021-01-25 PROCEDURE — 80048 BASIC METABOLIC PNL TOTAL CA: CPT

## 2021-01-25 PROCEDURE — 3079F DIAST BP 80-89 MM HG: CPT | Mod: CPTII,S$GLB,, | Performed by: INTERNAL MEDICINE

## 2021-01-25 PROCEDURE — 99999 PR PBB SHADOW E&M-EST. PATIENT-LVL IV: ICD-10-PCS | Mod: PBBFAC,,, | Performed by: INTERNAL MEDICINE

## 2021-01-25 PROCEDURE — 3046F HEMOGLOBIN A1C LEVEL >9.0%: CPT | Mod: CPTII,S$GLB,, | Performed by: INTERNAL MEDICINE

## 2021-01-25 PROCEDURE — 99499 UNLISTED E&M SERVICE: CPT | Mod: S$GLB,,, | Performed by: INTERNAL MEDICINE

## 2021-01-25 PROCEDURE — 99215 OFFICE O/P EST HI 40 MIN: CPT | Mod: S$GLB,,, | Performed by: INTERNAL MEDICINE

## 2021-01-25 PROCEDURE — 36415 COLL VENOUS BLD VENIPUNCTURE: CPT | Mod: PO

## 2021-01-25 PROCEDURE — 3008F BODY MASS INDEX DOCD: CPT | Mod: CPTII,S$GLB,, | Performed by: INTERNAL MEDICINE

## 2021-01-25 PROCEDURE — 99999 PR PBB SHADOW E&M-EST. PATIENT-LVL IV: CPT | Mod: PBBFAC,,, | Performed by: INTERNAL MEDICINE

## 2021-01-25 PROCEDURE — 1126F PR PAIN SEVERITY QUANTIFIED, NO PAIN PRESENT: ICD-10-PCS | Mod: S$GLB,,, | Performed by: INTERNAL MEDICINE

## 2021-01-25 PROCEDURE — 99215 PR OFFICE/OUTPT VISIT, EST, LEVL V, 40-54 MIN: ICD-10-PCS | Mod: S$GLB,,, | Performed by: INTERNAL MEDICINE

## 2021-01-25 RX ORDER — SODIUM BICARBONATE 650 MG/1
TABLET ORAL
Qty: 120 TABLET | Refills: 11 | Status: SHIPPED | OUTPATIENT
Start: 2021-01-25 | End: 2023-01-01

## 2021-01-28 ENCOUNTER — PATIENT MESSAGE (OUTPATIENT)
Dept: TRANSPLANT | Facility: CLINIC | Age: 61
End: 2021-01-28

## 2021-01-28 ENCOUNTER — TELEPHONE (OUTPATIENT)
Dept: FAMILY MEDICINE | Facility: CLINIC | Age: 61
End: 2021-01-28

## 2021-01-28 DIAGNOSIS — Z94.2 LUNG REPLACED BY TRANSPLANT: ICD-10-CM

## 2021-01-28 DIAGNOSIS — Z79.2 NEED FOR PROPHYLACTIC ANTIBIOTIC: Primary | ICD-10-CM

## 2021-01-30 RX ORDER — SULFAMETHOXAZOLE AND TRIMETHOPRIM 400; 80 MG/1; MG/1
1 TABLET ORAL
Qty: 15 TABLET | Refills: 11 | Status: SHIPPED | OUTPATIENT
Start: 2021-02-01 | End: 2022-01-12 | Stop reason: SDUPTHER

## 2021-02-04 ENCOUNTER — DOCUMENTATION ONLY (OUTPATIENT)
Dept: TRANSPLANT | Facility: CLINIC | Age: 61
End: 2021-02-04

## 2021-02-06 ENCOUNTER — TELEPHONE (OUTPATIENT)
Dept: NEPHROLOGY | Facility: CLINIC | Age: 61
End: 2021-02-06

## 2021-02-06 DIAGNOSIS — N18.4 CKD (CHRONIC KIDNEY DISEASE) STAGE 4, GFR 15-29 ML/MIN: Primary | ICD-10-CM

## 2021-02-08 ENCOUNTER — HOSPITAL ENCOUNTER (OUTPATIENT)
Dept: PULMONOLOGY | Facility: HOSPITAL | Age: 61
Discharge: HOME | End: 2021-02-08
Attending: INTERNAL MEDICINE
Payer: MEDICARE

## 2021-02-08 ENCOUNTER — OFFICE VISIT (OUTPATIENT)
Dept: TRANSPLANT | Facility: CLINIC | Age: 61
End: 2021-02-08
Attending: INTERNAL MEDICINE
Payer: MEDICARE

## 2021-02-08 ENCOUNTER — TELEPHONE (OUTPATIENT)
Dept: NEPHROLOGY | Facility: CLINIC | Age: 61
End: 2021-02-08

## 2021-02-08 ENCOUNTER — HOSPITAL ENCOUNTER (OUTPATIENT)
Dept: RADIOLOGY | Facility: HOSPITAL | Age: 61
Discharge: HOME | End: 2021-02-08
Attending: INTERNAL MEDICINE
Payer: MEDICARE

## 2021-02-08 ENCOUNTER — HOSPITAL ENCOUNTER (OUTPATIENT)
Dept: PULMONOLOGY | Facility: CLINIC | Age: 61
Discharge: HOME | End: 2021-02-08
Payer: MEDICARE

## 2021-02-08 VITALS
RESPIRATION RATE: 20 BRPM | SYSTOLIC BLOOD PRESSURE: 152 MMHG | BODY MASS INDEX: 34.09 KG/M2 | TEMPERATURE: 97 F | DIASTOLIC BLOOD PRESSURE: 83 MMHG | HEART RATE: 97 BPM | OXYGEN SATURATION: 99 % | HEIGHT: 70 IN | WEIGHT: 238.13 LBS

## 2021-02-08 DIAGNOSIS — Z94.2 LUNG REPLACED BY TRANSPLANT: ICD-10-CM

## 2021-02-08 DIAGNOSIS — Z48.298 ENCOUNTER FOLLOWING ORGAN TRANSPLANT: Primary | ICD-10-CM

## 2021-02-08 DIAGNOSIS — N18.5 CKD (CHRONIC KIDNEY DISEASE), STAGE V: ICD-10-CM

## 2021-02-08 DIAGNOSIS — D84.9 IMMUNOSUPPRESSION: ICD-10-CM

## 2021-02-08 DIAGNOSIS — Z01.818 PRE-PROCEDURAL EXAMINATION: Primary | ICD-10-CM

## 2021-02-08 DIAGNOSIS — E11.65 TYPE 2 DIABETES MELLITUS WITH HYPERGLYCEMIA, WITH LONG-TERM CURRENT USE OF INSULIN: ICD-10-CM

## 2021-02-08 DIAGNOSIS — Z79.4 TYPE 2 DIABETES MELLITUS WITH HYPERGLYCEMIA, WITH LONG-TERM CURRENT USE OF INSULIN: ICD-10-CM

## 2021-02-08 DIAGNOSIS — K76.9 HEPATIC LESION: ICD-10-CM

## 2021-02-08 DIAGNOSIS — Z79.2 PROPHYLACTIC ANTIBIOTIC: ICD-10-CM

## 2021-02-08 LAB
FEF 25 75 LLN: 1.55
FEF 25 75 PRE REF: 69.9 %
FEF 25 75 REF: 3.1
FEV05 LLN: 1.71
FEV05 REF: 2.85
FEV1 FVC LLN: 66
FEV1 FVC PRE REF: 108.3 %
FEV1 FVC REF: 78
FEV1 LLN: 2.78
FEV1 PRE REF: 52.3 %
FEV1 REF: 3.66
FVC LLN: 3.61
FVC PRE REF: 48.2 %
FVC REF: 4.71
PEF LLN: 7.05
PEF PRE REF: 55 %
PEF REF: 9.36
PRE FEF 25 75: 2.17 L/S (ref 1.55–5.18)
PRE FET 100: 5.43 SEC
PRE FEV05 REF: 54.3 %
PRE FEV1 FVC: 84.19 % (ref 66.01–87.99)
PRE FEV1: 1.91 L (ref 2.78–4.49)
PRE FEV5: 1.54 L (ref 1.71–3.98)
PRE FVC: 2.27 L (ref 3.61–5.83)
PRE PEF: 5.15 L/S (ref 7.05–11.68)
SARS-COV-2 RDRP RESP QL NAA+PROBE: NEGATIVE

## 2021-02-08 PROCEDURE — 71046 X-RAY EXAM CHEST 2 VIEWS: CPT | Mod: TC

## 2021-02-08 PROCEDURE — 99999 PR PBB SHADOW E&M-EST. PATIENT-LVL IV: CPT | Mod: PBBFAC,,, | Performed by: INTERNAL MEDICINE

## 2021-02-08 PROCEDURE — 3008F PR BODY MASS INDEX (BMI) DOCUMENTED: ICD-10-PCS | Mod: CPTII,S$GLB,, | Performed by: INTERNAL MEDICINE

## 2021-02-08 PROCEDURE — U0002 COVID-19 LAB TEST NON-CDC: HCPCS

## 2021-02-08 PROCEDURE — 3008F BODY MASS INDEX DOCD: CPT | Mod: CPTII,S$GLB,, | Performed by: INTERNAL MEDICINE

## 2021-02-08 PROCEDURE — 99215 OFFICE O/P EST HI 40 MIN: CPT | Mod: 25,S$GLB,, | Performed by: INTERNAL MEDICINE

## 2021-02-08 PROCEDURE — 99999 PR PBB SHADOW E&M-EST. PATIENT-LVL IV: ICD-10-PCS | Mod: PBBFAC,,, | Performed by: INTERNAL MEDICINE

## 2021-02-08 PROCEDURE — 94010 BREATHING CAPACITY TEST: ICD-10-PCS | Mod: 26,,, | Performed by: INTERNAL MEDICINE

## 2021-02-08 PROCEDURE — 3046F HEMOGLOBIN A1C LEVEL >9.0%: CPT | Mod: CPTII,S$GLB,, | Performed by: INTERNAL MEDICINE

## 2021-02-08 PROCEDURE — 3079F PR MOST RECENT DIASTOLIC BLOOD PRESSURE 80-89 MM HG: ICD-10-PCS | Mod: CPTII,S$GLB,, | Performed by: INTERNAL MEDICINE

## 2021-02-08 PROCEDURE — 3046F PR MOST RECENT HEMOGLOBIN A1C LEVEL > 9.0%: ICD-10-PCS | Mod: CPTII,S$GLB,, | Performed by: INTERNAL MEDICINE

## 2021-02-08 PROCEDURE — 71046 XR CHEST PA AND LATERAL: ICD-10-PCS | Mod: 26,,, | Performed by: RADIOLOGY

## 2021-02-08 PROCEDURE — 3077F SYST BP >= 140 MM HG: CPT | Mod: CPTII,S$GLB,, | Performed by: INTERNAL MEDICINE

## 2021-02-08 PROCEDURE — 3079F DIAST BP 80-89 MM HG: CPT | Mod: CPTII,S$GLB,, | Performed by: INTERNAL MEDICINE

## 2021-02-08 PROCEDURE — 99215 PR OFFICE/OUTPT VISIT, EST, LEVL V, 40-54 MIN: ICD-10-PCS | Mod: 25,S$GLB,, | Performed by: INTERNAL MEDICINE

## 2021-02-08 PROCEDURE — 3077F PR MOST RECENT SYSTOLIC BLOOD PRESSURE >= 140 MM HG: ICD-10-PCS | Mod: CPTII,S$GLB,, | Performed by: INTERNAL MEDICINE

## 2021-02-08 PROCEDURE — 94010 BREATHING CAPACITY TEST: CPT | Mod: 26,,, | Performed by: INTERNAL MEDICINE

## 2021-02-08 PROCEDURE — 71046 X-RAY EXAM CHEST 2 VIEWS: CPT | Mod: 26,,, | Performed by: RADIOLOGY

## 2021-02-09 ENCOUNTER — PATIENT OUTREACH (OUTPATIENT)
Dept: ADMINISTRATIVE | Facility: HOSPITAL | Age: 61
End: 2021-02-09

## 2021-02-09 RX ORDER — TACROLIMUS 0.5 MG/1
CAPSULE ORAL
Qty: 210 CAPSULE | Refills: 11 | Status: ON HOLD | OUTPATIENT
Start: 2021-02-09 | End: 2021-05-11 | Stop reason: SDUPTHER

## 2021-02-17 LAB
FUNGUS SPEC CULT: NORMAL
FUNGUS SPEC CULT: NORMAL

## 2021-02-18 DIAGNOSIS — T86.810 ANTIBODY MEDIATED REJECTION OF LUNG TRANSPLANT: Primary | ICD-10-CM

## 2021-02-19 ENCOUNTER — LAB VISIT (OUTPATIENT)
Dept: LAB | Facility: HOSPITAL | Age: 61
End: 2021-02-19
Attending: FAMILY MEDICINE
Payer: MEDICARE

## 2021-02-19 DIAGNOSIS — Z94.2 LUNG REPLACED BY TRANSPLANT: ICD-10-CM

## 2021-02-19 LAB
ANION GAP SERPL CALC-SCNC: 14 MMOL/L (ref 8–16)
BUN SERPL-MCNC: 79 MG/DL (ref 6–20)
CALCIUM SERPL-MCNC: 8.5 MG/DL (ref 8.7–10.5)
CHLORIDE SERPL-SCNC: 108 MMOL/L (ref 95–110)
CO2 SERPL-SCNC: 17 MMOL/L (ref 23–29)
CREAT SERPL-MCNC: 8.6 MG/DL (ref 0.5–1.4)
EST. GFR  (AFRICAN AMERICAN): 7.1 ML/MIN/1.73 M^2
EST. GFR  (NON AFRICAN AMERICAN): 6.1 ML/MIN/1.73 M^2
GLUCOSE SERPL-MCNC: 130 MG/DL (ref 70–110)
POTASSIUM SERPL-SCNC: 4.9 MMOL/L (ref 3.5–5.1)
SODIUM SERPL-SCNC: 139 MMOL/L (ref 136–145)

## 2021-02-19 PROCEDURE — 80048 BASIC METABOLIC PNL TOTAL CA: CPT

## 2021-02-19 PROCEDURE — 36415 COLL VENOUS BLD VENIPUNCTURE: CPT | Mod: PO

## 2021-02-19 PROCEDURE — 80197 ASSAY OF TACROLIMUS: CPT

## 2021-02-20 LAB — TACROLIMUS BLD-MCNC: 3.3 NG/ML (ref 5–15)

## 2021-02-22 ENCOUNTER — OFFICE VISIT (OUTPATIENT)
Dept: NEPHROLOGY | Facility: CLINIC | Age: 61
End: 2021-02-22
Payer: MEDICARE

## 2021-02-22 ENCOUNTER — PATIENT OUTREACH (OUTPATIENT)
Dept: ADMINISTRATIVE | Facility: OTHER | Age: 61
End: 2021-02-22

## 2021-02-22 VITALS
WEIGHT: 238 LBS | HEIGHT: 70 IN | TEMPERATURE: 97 F | BODY MASS INDEX: 34.07 KG/M2 | HEART RATE: 91 BPM | DIASTOLIC BLOOD PRESSURE: 80 MMHG | OXYGEN SATURATION: 99 % | SYSTOLIC BLOOD PRESSURE: 142 MMHG

## 2021-02-22 DIAGNOSIS — E87.5 HYPERKALEMIA: ICD-10-CM

## 2021-02-22 DIAGNOSIS — E78.2 COMBINED HYPERLIPIDEMIA ASSOCIATED WITH TYPE 2 DIABETES MELLITUS: ICD-10-CM

## 2021-02-22 DIAGNOSIS — N17.9 AKI (ACUTE KIDNEY INJURY): Primary | ICD-10-CM

## 2021-02-22 DIAGNOSIS — E78.2 MIXED HYPERLIPIDEMIA: ICD-10-CM

## 2021-02-22 DIAGNOSIS — R80.1 PERSISTENT PROTEINURIA: ICD-10-CM

## 2021-02-22 DIAGNOSIS — I15.2 HYPERTENSION ASSOCIATED WITH DIABETES: ICD-10-CM

## 2021-02-22 DIAGNOSIS — E11.69 COMBINED HYPERLIPIDEMIA ASSOCIATED WITH TYPE 2 DIABETES MELLITUS: ICD-10-CM

## 2021-02-22 DIAGNOSIS — I48.0 PAROXYSMAL ATRIAL FIBRILLATION: ICD-10-CM

## 2021-02-22 DIAGNOSIS — D86.0 SARCOIDOSIS OF LUNG: ICD-10-CM

## 2021-02-22 DIAGNOSIS — E78.00 PURE HYPERCHOLESTEROLEMIA: ICD-10-CM

## 2021-02-22 DIAGNOSIS — N18.4 CKD (CHRONIC KIDNEY DISEASE) STAGE 4, GFR 15-29 ML/MIN: ICD-10-CM

## 2021-02-22 DIAGNOSIS — D84.9 IMMUNOSUPPRESSION: ICD-10-CM

## 2021-02-22 DIAGNOSIS — E11.59 HYPERTENSION ASSOCIATED WITH DIABETES: ICD-10-CM

## 2021-02-22 PROCEDURE — 3046F HEMOGLOBIN A1C LEVEL >9.0%: CPT | Mod: CPTII,S$GLB,, | Performed by: INTERNAL MEDICINE

## 2021-02-22 PROCEDURE — 1126F PR PAIN SEVERITY QUANTIFIED, NO PAIN PRESENT: ICD-10-PCS | Mod: S$GLB,,, | Performed by: INTERNAL MEDICINE

## 2021-02-22 PROCEDURE — 3046F PR MOST RECENT HEMOGLOBIN A1C LEVEL > 9.0%: ICD-10-PCS | Mod: CPTII,S$GLB,, | Performed by: INTERNAL MEDICINE

## 2021-02-22 PROCEDURE — 99215 PR OFFICE/OUTPT VISIT, EST, LEVL V, 40-54 MIN: ICD-10-PCS | Mod: S$GLB,,, | Performed by: INTERNAL MEDICINE

## 2021-02-22 PROCEDURE — 1126F AMNT PAIN NOTED NONE PRSNT: CPT | Mod: S$GLB,,, | Performed by: INTERNAL MEDICINE

## 2021-02-22 PROCEDURE — 3077F SYST BP >= 140 MM HG: CPT | Mod: CPTII,S$GLB,, | Performed by: INTERNAL MEDICINE

## 2021-02-22 PROCEDURE — 99999 PR PBB SHADOW E&M-EST. PATIENT-LVL IV: CPT | Mod: PBBFAC,,, | Performed by: INTERNAL MEDICINE

## 2021-02-22 PROCEDURE — 3079F PR MOST RECENT DIASTOLIC BLOOD PRESSURE 80-89 MM HG: ICD-10-PCS | Mod: CPTII,S$GLB,, | Performed by: INTERNAL MEDICINE

## 2021-02-22 PROCEDURE — 3008F BODY MASS INDEX DOCD: CPT | Mod: CPTII,S$GLB,, | Performed by: INTERNAL MEDICINE

## 2021-02-22 PROCEDURE — 99215 OFFICE O/P EST HI 40 MIN: CPT | Mod: S$GLB,,, | Performed by: INTERNAL MEDICINE

## 2021-02-22 PROCEDURE — 99999 PR PBB SHADOW E&M-EST. PATIENT-LVL IV: ICD-10-PCS | Mod: PBBFAC,,, | Performed by: INTERNAL MEDICINE

## 2021-02-22 PROCEDURE — 3008F PR BODY MASS INDEX (BMI) DOCUMENTED: ICD-10-PCS | Mod: CPTII,S$GLB,, | Performed by: INTERNAL MEDICINE

## 2021-02-22 PROCEDURE — 3079F DIAST BP 80-89 MM HG: CPT | Mod: CPTII,S$GLB,, | Performed by: INTERNAL MEDICINE

## 2021-02-22 PROCEDURE — 3077F PR MOST RECENT SYSTOLIC BLOOD PRESSURE >= 140 MM HG: ICD-10-PCS | Mod: CPTII,S$GLB,, | Performed by: INTERNAL MEDICINE

## 2021-02-23 ENCOUNTER — OFFICE VISIT (OUTPATIENT)
Dept: OPHTHALMOLOGY | Facility: CLINIC | Age: 61
End: 2021-02-23
Payer: MEDICARE

## 2021-02-23 DIAGNOSIS — E11.36 DIABETIC CATARACT: ICD-10-CM

## 2021-02-23 DIAGNOSIS — H52.7 REFRACTIVE ERRORS: ICD-10-CM

## 2021-02-23 DIAGNOSIS — E11.9 DIABETES MELLITUS TYPE 2 WITHOUT RETINOPATHY: Primary | ICD-10-CM

## 2021-02-23 PROCEDURE — 92004 PR EYE EXAM, NEW PATIENT,COMPREHESV: ICD-10-PCS | Mod: S$GLB,,, | Performed by: OPTOMETRIST

## 2021-02-23 PROCEDURE — 92015 PR REFRACTION: ICD-10-PCS | Mod: S$GLB,,, | Performed by: OPTOMETRIST

## 2021-02-23 PROCEDURE — 99999 PR PBB SHADOW E&M-EST. PATIENT-LVL III: CPT | Mod: PBBFAC,,, | Performed by: OPTOMETRIST

## 2021-02-23 PROCEDURE — 92004 COMPRE OPH EXAM NEW PT 1/>: CPT | Mod: S$GLB,,, | Performed by: OPTOMETRIST

## 2021-02-23 PROCEDURE — 99999 PR PBB SHADOW E&M-EST. PATIENT-LVL III: ICD-10-PCS | Mod: PBBFAC,,, | Performed by: OPTOMETRIST

## 2021-02-23 PROCEDURE — 92015 DETERMINE REFRACTIVE STATE: CPT | Mod: S$GLB,,, | Performed by: OPTOMETRIST

## 2021-02-23 PROCEDURE — 2023F DILAT RTA XM W/O RTNOPTHY: CPT | Mod: S$GLB,,, | Performed by: OPTOMETRIST

## 2021-02-23 PROCEDURE — 2023F PR DILATED RETINAL EXAM W/O EVID OF RETINOPATHY: ICD-10-PCS | Mod: S$GLB,,, | Performed by: OPTOMETRIST

## 2021-02-24 ENCOUNTER — TELEPHONE (OUTPATIENT)
Dept: NEPHROLOGY | Facility: CLINIC | Age: 61
End: 2021-02-24

## 2021-02-24 DIAGNOSIS — N17.9 AKI (ACUTE KIDNEY INJURY): Primary | ICD-10-CM

## 2021-02-25 ENCOUNTER — TELEPHONE (OUTPATIENT)
Dept: NEPHROLOGY | Facility: CLINIC | Age: 61
End: 2021-02-25

## 2021-02-25 DIAGNOSIS — R80.1 PERSISTENT PROTEINURIA: ICD-10-CM

## 2021-02-25 DIAGNOSIS — N17.9 AKI (ACUTE KIDNEY INJURY): Primary | ICD-10-CM

## 2021-02-25 DIAGNOSIS — N18.4 CKD (CHRONIC KIDNEY DISEASE) STAGE 4, GFR 15-29 ML/MIN: ICD-10-CM

## 2021-03-04 ENCOUNTER — TELEPHONE (OUTPATIENT)
Dept: INTERVENTIONAL RADIOLOGY/VASCULAR | Facility: HOSPITAL | Age: 61
End: 2021-03-04

## 2021-03-09 ENCOUNTER — TELEPHONE (OUTPATIENT)
Dept: TRANSPLANT | Facility: CLINIC | Age: 61
End: 2021-03-09

## 2021-03-11 ENCOUNTER — TELEPHONE (OUTPATIENT)
Dept: TRANSPLANT | Facility: CLINIC | Age: 61
End: 2021-03-11

## 2021-03-16 DIAGNOSIS — Z94.2 LUNG REPLACED BY TRANSPLANT: Primary | ICD-10-CM

## 2021-03-18 ENCOUNTER — TELEPHONE (OUTPATIENT)
Dept: INTERVENTIONAL RADIOLOGY/VASCULAR | Facility: HOSPITAL | Age: 61
End: 2021-03-18

## 2021-03-19 ENCOUNTER — IMMUNIZATION (OUTPATIENT)
Dept: FAMILY MEDICINE | Facility: CLINIC | Age: 61
End: 2021-03-19
Payer: MEDICARE

## 2021-03-19 DIAGNOSIS — Z23 NEED FOR VACCINATION: Primary | ICD-10-CM

## 2021-03-19 PROCEDURE — 91300 COVID-19, MRNA, LNP-S, PF, 30 MCG/0.3 ML DOSE VACCINE: ICD-10-PCS | Mod: S$GLB,,, | Performed by: FAMILY MEDICINE

## 2021-03-19 PROCEDURE — 0001A COVID-19, MRNA, LNP-S, PF, 30 MCG/0.3 ML DOSE VACCINE: CPT | Mod: CV19,S$GLB,, | Performed by: FAMILY MEDICINE

## 2021-03-19 PROCEDURE — 0001A COVID-19, MRNA, LNP-S, PF, 30 MCG/0.3 ML DOSE VACCINE: ICD-10-PCS | Mod: CV19,S$GLB,, | Performed by: FAMILY MEDICINE

## 2021-03-19 PROCEDURE — 91300 COVID-19, MRNA, LNP-S, PF, 30 MCG/0.3 ML DOSE VACCINE: CPT | Mod: S$GLB,,, | Performed by: FAMILY MEDICINE

## 2021-03-31 ENCOUNTER — TELEPHONE (OUTPATIENT)
Dept: TRANSPLANT | Facility: CLINIC | Age: 61
End: 2021-03-31

## 2021-04-01 ENCOUNTER — TELEPHONE (OUTPATIENT)
Dept: TRANSPLANT | Facility: CLINIC | Age: 61
End: 2021-04-01

## 2021-04-05 ENCOUNTER — LAB VISIT (OUTPATIENT)
Dept: FAMILY MEDICINE | Facility: CLINIC | Age: 61
End: 2021-04-05
Payer: MEDICARE

## 2021-04-05 DIAGNOSIS — Z01.812 PRE-PROCEDURE LAB EXAM: ICD-10-CM

## 2021-04-05 PROCEDURE — U0003 INFECTIOUS AGENT DETECTION BY NUCLEIC ACID (DNA OR RNA); SEVERE ACUTE RESPIRATORY SYNDROME CORONAVIRUS 2 (SARS-COV-2) (CORONAVIRUS DISEASE [COVID-19]), AMPLIFIED PROBE TECHNIQUE, MAKING USE OF HIGH THROUGHPUT TECHNOLOGIES AS DESCRIBED BY CMS-2020-01-R: HCPCS | Performed by: INTERNAL MEDICINE

## 2021-04-05 PROCEDURE — U0005 INFEC AGEN DETEC AMPLI PROBE: HCPCS | Performed by: INTERNAL MEDICINE

## 2021-04-06 LAB — SARS-COV-2 RNA RESP QL NAA+PROBE: NOT DETECTED

## 2021-04-07 ENCOUNTER — HOSPITAL ENCOUNTER (OUTPATIENT)
Dept: RADIOLOGY | Facility: HOSPITAL | Age: 61
Discharge: HOME | End: 2021-04-07
Attending: INTERNAL MEDICINE
Payer: MEDICARE

## 2021-04-07 ENCOUNTER — HOSPITAL ENCOUNTER (OUTPATIENT)
Dept: PULMONOLOGY | Facility: CLINIC | Age: 61
Discharge: HOME | End: 2021-04-07
Payer: MEDICARE

## 2021-04-07 ENCOUNTER — PATIENT MESSAGE (OUTPATIENT)
Dept: TRANSPLANT | Facility: CLINIC | Age: 61
End: 2021-04-07

## 2021-04-07 ENCOUNTER — HOSPITAL ENCOUNTER (OUTPATIENT)
Dept: PULMONOLOGY | Facility: HOSPITAL | Age: 61
Discharge: HOME | End: 2021-04-07
Attending: INTERNAL MEDICINE
Payer: MEDICARE

## 2021-04-07 ENCOUNTER — OFFICE VISIT (OUTPATIENT)
Dept: TRANSPLANT | Facility: CLINIC | Age: 61
End: 2021-04-07
Payer: MEDICARE

## 2021-04-07 VITALS — HEIGHT: 70 IN | WEIGHT: 235 LBS | BODY MASS INDEX: 33.64 KG/M2

## 2021-04-07 VITALS
SYSTOLIC BLOOD PRESSURE: 158 MMHG | RESPIRATION RATE: 20 BRPM | WEIGHT: 235.88 LBS | HEART RATE: 95 BPM | TEMPERATURE: 97 F | DIASTOLIC BLOOD PRESSURE: 92 MMHG | OXYGEN SATURATION: 96 % | BODY MASS INDEX: 33.77 KG/M2 | HEIGHT: 70 IN

## 2021-04-07 DIAGNOSIS — R06.09 DYSPNEA ON EXERTION: ICD-10-CM

## 2021-04-07 DIAGNOSIS — E87.5 HYPERKALEMIA: ICD-10-CM

## 2021-04-07 DIAGNOSIS — Z94.2 LUNG REPLACED BY TRANSPLANT: ICD-10-CM

## 2021-04-07 DIAGNOSIS — R06.09 DYSPNEA ON EXERTION: Primary | ICD-10-CM

## 2021-04-07 DIAGNOSIS — Z48.298 ENCOUNTER FOLLOWING ORGAN TRANSPLANT: Primary | ICD-10-CM

## 2021-04-07 DIAGNOSIS — N18.5 CKD (CHRONIC KIDNEY DISEASE), STAGE V: ICD-10-CM

## 2021-04-07 DIAGNOSIS — R91.8 PULMONARY NODULES: ICD-10-CM

## 2021-04-07 DIAGNOSIS — D84.9 IMMUNOSUPPRESSION: ICD-10-CM

## 2021-04-07 DIAGNOSIS — Z79.2 PROPHYLACTIC ANTIBIOTIC: ICD-10-CM

## 2021-04-07 LAB
FEF 25 75 LLN: 1.55
FEF 25 75 PRE REF: 43.7 %
FEF 25 75 REF: 3.09
FEV05 LLN: 1.71
FEV05 REF: 2.85
FEV1 FVC LLN: 66
FEV1 FVC PRE REF: 99.4 %
FEV1 FVC REF: 78
FEV1 LLN: 2.77
FEV1 PRE REF: 43 %
FEV1 REF: 3.65
FVC LLN: 3.61
FVC PRE REF: 43.2 %
FVC REF: 4.71
PEF LLN: 7.05
PEF PRE REF: 46.1 %
PEF REF: 9.36
PRE FEF 25 75: 1.35 L/S (ref 1.55–5.16)
PRE FET 100: 5.72 SEC
PRE FEV05 REF: 44.6 %
PRE FEV1 FVC: 77.31 % (ref 65.96–87.98)
PRE FEV1: 1.57 L (ref 2.77–4.48)
PRE FEV5: 1.27 L (ref 1.71–3.98)
PRE FVC: 2.03 L (ref 3.61–5.82)
PRE PEF: 4.32 L/S (ref 7.05–11.68)

## 2021-04-07 PROCEDURE — 3080F DIAST BP >= 90 MM HG: CPT | Mod: CPTII,S$GLB,, | Performed by: INTERNAL MEDICINE

## 2021-04-07 PROCEDURE — 3077F SYST BP >= 140 MM HG: CPT | Mod: CPTII,S$GLB,, | Performed by: INTERNAL MEDICINE

## 2021-04-07 PROCEDURE — 71046 X-RAY EXAM CHEST 2 VIEWS: CPT | Mod: 26,,, | Performed by: RADIOLOGY

## 2021-04-07 PROCEDURE — 3008F PR BODY MASS INDEX (BMI) DOCUMENTED: ICD-10-PCS | Mod: CPTII,S$GLB,, | Performed by: INTERNAL MEDICINE

## 2021-04-07 PROCEDURE — 3008F BODY MASS INDEX DOCD: CPT | Mod: CPTII,S$GLB,, | Performed by: INTERNAL MEDICINE

## 2021-04-07 PROCEDURE — 71046 X-RAY EXAM CHEST 2 VIEWS: CPT | Mod: TC

## 2021-04-07 PROCEDURE — 94010 BREATHING CAPACITY TEST: ICD-10-PCS | Mod: 26,,, | Performed by: INTERNAL MEDICINE

## 2021-04-07 PROCEDURE — 71046 XR CHEST PA AND LATERAL: ICD-10-PCS | Mod: 26,,, | Performed by: RADIOLOGY

## 2021-04-07 PROCEDURE — 99999 PR PBB SHADOW E&M-EST. PATIENT-LVL IV: CPT | Mod: PBBFAC,,, | Performed by: INTERNAL MEDICINE

## 2021-04-07 PROCEDURE — 3080F PR MOST RECENT DIASTOLIC BLOOD PRESSURE >= 90 MM HG: ICD-10-PCS | Mod: CPTII,S$GLB,, | Performed by: INTERNAL MEDICINE

## 2021-04-07 PROCEDURE — 99214 OFFICE O/P EST MOD 30 MIN: CPT | Mod: 25,S$GLB,, | Performed by: INTERNAL MEDICINE

## 2021-04-07 PROCEDURE — 99999 PR PBB SHADOW E&M-EST. PATIENT-LVL IV: ICD-10-PCS | Mod: PBBFAC,,, | Performed by: INTERNAL MEDICINE

## 2021-04-07 PROCEDURE — 94618 PULMONARY STRESS TESTING: ICD-10-PCS | Mod: S$GLB,,, | Performed by: INTERNAL MEDICINE

## 2021-04-07 PROCEDURE — 94618 PULMONARY STRESS TESTING: CPT | Mod: S$GLB,,, | Performed by: INTERNAL MEDICINE

## 2021-04-07 PROCEDURE — 94010 BREATHING CAPACITY TEST: CPT | Mod: 26,,, | Performed by: INTERNAL MEDICINE

## 2021-04-07 PROCEDURE — 99214 PR OFFICE/OUTPT VISIT, EST, LEVL IV, 30-39 MIN: ICD-10-PCS | Mod: 25,S$GLB,, | Performed by: INTERNAL MEDICINE

## 2021-04-07 PROCEDURE — 3077F PR MOST RECENT SYSTOLIC BLOOD PRESSURE >= 140 MM HG: ICD-10-PCS | Mod: CPTII,S$GLB,, | Performed by: INTERNAL MEDICINE

## 2021-04-07 RX ORDER — CLONIDINE HYDROCHLORIDE 0.1 MG/1
0.1 TABLET ORAL 3 TIMES DAILY
Qty: 90 TABLET | Refills: 11 | Status: ON HOLD | OUTPATIENT
Start: 2021-04-07 | End: 2021-05-11 | Stop reason: SDUPTHER

## 2021-04-07 RX ORDER — CLONIDINE HYDROCHLORIDE 0.1 MG/1
0.1 TABLET ORAL 3 TIMES DAILY
Qty: 90 TABLET | Refills: 11 | Status: SHIPPED | OUTPATIENT
Start: 2021-04-07 | End: 2021-04-07

## 2021-04-07 RX ORDER — SODIUM POLYSTYRENE SULFONATE 4.1 MEQ/G
15 POWDER, FOR SUSPENSION ORAL; RECTAL DAILY PRN
Qty: 453.6 G | Refills: 2 | Status: ON HOLD | OUTPATIENT
Start: 2021-04-07 | End: 2021-05-11 | Stop reason: HOSPADM

## 2021-04-08 DIAGNOSIS — R09.02 HYPOXIC: ICD-10-CM

## 2021-04-08 DIAGNOSIS — R06.09 DYSPNEA ON EXERTION: Primary | ICD-10-CM

## 2021-04-08 DIAGNOSIS — Z94.2 LUNG REPLACED BY TRANSPLANT: ICD-10-CM

## 2021-04-09 ENCOUNTER — IMMUNIZATION (OUTPATIENT)
Dept: FAMILY MEDICINE | Facility: CLINIC | Age: 61
End: 2021-04-09
Payer: MEDICARE

## 2021-04-09 ENCOUNTER — LAB VISIT (OUTPATIENT)
Dept: LAB | Facility: HOSPITAL | Age: 61
End: 2021-04-09
Attending: FAMILY MEDICINE
Payer: MEDICARE

## 2021-04-09 ENCOUNTER — SOCIAL WORK (OUTPATIENT)
Dept: TRANSPLANT | Facility: HOSPITAL | Age: 61
End: 2021-04-09

## 2021-04-09 DIAGNOSIS — Z23 NEED FOR VACCINATION: Primary | ICD-10-CM

## 2021-04-09 DIAGNOSIS — Z94.2 LUNG REPLACED BY TRANSPLANT: ICD-10-CM

## 2021-04-09 LAB
ANION GAP SERPL CALC-SCNC: 11 MMOL/L (ref 8–16)
BUN SERPL-MCNC: 76 MG/DL (ref 6–20)
CALCIUM SERPL-MCNC: 7.9 MG/DL (ref 8.7–10.5)
CHLORIDE SERPL-SCNC: 105 MMOL/L (ref 95–110)
CO2 SERPL-SCNC: 20 MMOL/L (ref 23–29)
CREAT SERPL-MCNC: 7.7 MG/DL (ref 0.5–1.4)
EST. GFR  (AFRICAN AMERICAN): 8.1 ML/MIN/1.73 M^2
EST. GFR  (NON AFRICAN AMERICAN): 7 ML/MIN/1.73 M^2
GLUCOSE SERPL-MCNC: 151 MG/DL (ref 70–110)
POTASSIUM SERPL-SCNC: 4.2 MMOL/L (ref 3.5–5.1)
SODIUM SERPL-SCNC: 136 MMOL/L (ref 136–145)

## 2021-04-09 PROCEDURE — 0002A COVID-19, MRNA, LNP-S, PF, 30 MCG/0.3 ML DOSE VACCINE: ICD-10-PCS | Mod: CV19,,, | Performed by: FAMILY MEDICINE

## 2021-04-09 PROCEDURE — 0002A COVID-19, MRNA, LNP-S, PF, 30 MCG/0.3 ML DOSE VACCINE: CPT | Mod: CV19,,, | Performed by: FAMILY MEDICINE

## 2021-04-09 PROCEDURE — 91300 COVID-19, MRNA, LNP-S, PF, 30 MCG/0.3 ML DOSE VACCINE: CPT | Mod: ,,, | Performed by: FAMILY MEDICINE

## 2021-04-09 PROCEDURE — 80048 BASIC METABOLIC PNL TOTAL CA: CPT | Performed by: INTERNAL MEDICINE

## 2021-04-09 PROCEDURE — 91300 COVID-19, MRNA, LNP-S, PF, 30 MCG/0.3 ML DOSE VACCINE: ICD-10-PCS | Mod: ,,, | Performed by: FAMILY MEDICINE

## 2021-04-09 PROCEDURE — 36415 COLL VENOUS BLD VENIPUNCTURE: CPT | Mod: PO | Performed by: INTERNAL MEDICINE

## 2021-04-12 ENCOUNTER — TELEPHONE (OUTPATIENT)
Dept: TRANSPLANT | Facility: CLINIC | Age: 61
End: 2021-04-12

## 2021-04-13 DIAGNOSIS — N18.4 CKD STAGE 4 DUE TO TYPE 2 DIABETES MELLITUS: Primary | ICD-10-CM

## 2021-04-13 DIAGNOSIS — E11.22 CKD STAGE 4 DUE TO TYPE 2 DIABETES MELLITUS: Primary | ICD-10-CM

## 2021-04-16 ENCOUNTER — HOSPITAL ENCOUNTER (OUTPATIENT)
Dept: INTERVENTIONAL RADIOLOGY/VASCULAR | Facility: HOSPITAL | Age: 61
Discharge: HOME | End: 2021-04-16
Attending: INTERNAL MEDICINE
Payer: MEDICARE

## 2021-04-16 VITALS
BODY MASS INDEX: 32.9 KG/M2 | SYSTOLIC BLOOD PRESSURE: 154 MMHG | HEIGHT: 71 IN | RESPIRATION RATE: 16 BRPM | DIASTOLIC BLOOD PRESSURE: 88 MMHG | OXYGEN SATURATION: 97 % | WEIGHT: 235 LBS | HEART RATE: 96 BPM | TEMPERATURE: 98 F

## 2021-04-16 DIAGNOSIS — N17.9 AKI (ACUTE KIDNEY INJURY): ICD-10-CM

## 2021-04-16 LAB
ALBUMIN SERPL BCP-MCNC: 3.1 G/DL (ref 3.5–5.2)
ALP SERPL-CCNC: 92 U/L (ref 55–135)
ALT SERPL W/O P-5'-P-CCNC: 14 U/L (ref 10–44)
ANION GAP SERPL CALC-SCNC: 11 MMOL/L (ref 8–16)
AST SERPL-CCNC: 16 U/L (ref 10–40)
BILIRUB SERPL-MCNC: 0.4 MG/DL (ref 0.1–1)
BUN SERPL-MCNC: 72 MG/DL (ref 6–20)
CALCIUM SERPL-MCNC: 8.2 MG/DL (ref 8.7–10.5)
CHLORIDE SERPL-SCNC: 110 MMOL/L (ref 95–110)
CO2 SERPL-SCNC: 18 MMOL/L (ref 23–29)
CREAT SERPL-MCNC: 7.7 MG/DL (ref 0.5–1.4)
EST. GFR  (AFRICAN AMERICAN): 8.1 ML/MIN/1.73 M^2
EST. GFR  (NON AFRICAN AMERICAN): 7 ML/MIN/1.73 M^2
GLUCOSE SERPL-MCNC: 124 MG/DL (ref 70–110)
INR PPP: 0.9 (ref 0.8–1.2)
POCT GLUCOSE: 123 MG/DL (ref 70–110)
POTASSIUM SERPL-SCNC: 4.5 MMOL/L (ref 3.5–5.1)
PROT SERPL-MCNC: 7.1 G/DL (ref 6–8.4)
PROTHROMBIN TIME: 9.6 SEC (ref 9–12.5)
SODIUM SERPL-SCNC: 139 MMOL/L (ref 136–145)

## 2021-04-16 PROCEDURE — 77012 CT SCAN FOR NEEDLE BIOPSY: CPT | Mod: TC | Performed by: STUDENT IN AN ORGANIZED HEALTH CARE EDUCATION/TRAINING PROGRAM

## 2021-04-16 PROCEDURE — 63600175 PHARM REV CODE 636 W HCPCS: Performed by: STUDENT IN AN ORGANIZED HEALTH CARE EDUCATION/TRAINING PROGRAM

## 2021-04-16 PROCEDURE — 50200 IR BIOPSY KIDNEY: ICD-10-PCS | Mod: LT,,, | Performed by: STUDENT IN AN ORGANIZED HEALTH CARE EDUCATION/TRAINING PROGRAM

## 2021-04-16 PROCEDURE — 25000003 PHARM REV CODE 250: Performed by: STUDENT IN AN ORGANIZED HEALTH CARE EDUCATION/TRAINING PROGRAM

## 2021-04-16 PROCEDURE — 85610 PROTHROMBIN TIME: CPT | Performed by: FAMILY MEDICINE

## 2021-04-16 PROCEDURE — 99152 MOD SED SAME PHYS/QHP 5/>YRS: CPT | Mod: ,,, | Performed by: STUDENT IN AN ORGANIZED HEALTH CARE EDUCATION/TRAINING PROGRAM

## 2021-04-16 PROCEDURE — 50200 RENAL BIOPSY PERQ: CPT | Mod: LT,,, | Performed by: STUDENT IN AN ORGANIZED HEALTH CARE EDUCATION/TRAINING PROGRAM

## 2021-04-16 PROCEDURE — 99152 PR MOD CONSCIOUS SEDATION, SAME PHYS, 5+ YRS, FIRST 15 MIN: ICD-10-PCS | Mod: ,,, | Performed by: STUDENT IN AN ORGANIZED HEALTH CARE EDUCATION/TRAINING PROGRAM

## 2021-04-16 PROCEDURE — 27201068 IR BIOPSY KIDNEY

## 2021-04-16 PROCEDURE — 63600175 PHARM REV CODE 636 W HCPCS: Mod: JG | Performed by: STUDENT IN AN ORGANIZED HEALTH CARE EDUCATION/TRAINING PROGRAM

## 2021-04-16 PROCEDURE — 99152 MOD SED SAME PHYS/QHP 5/>YRS: CPT

## 2021-04-16 PROCEDURE — 77012 PR  CT GUIDANCE NEEDLE PLACEMENT: ICD-10-PCS | Mod: 26,,, | Performed by: STUDENT IN AN ORGANIZED HEALTH CARE EDUCATION/TRAINING PROGRAM

## 2021-04-16 PROCEDURE — 80053 COMPREHEN METABOLIC PANEL: CPT | Performed by: FAMILY MEDICINE

## 2021-04-16 PROCEDURE — 82962 GLUCOSE BLOOD TEST: CPT

## 2021-04-16 PROCEDURE — 77012 CT SCAN FOR NEEDLE BIOPSY: CPT | Mod: 26,,, | Performed by: STUDENT IN AN ORGANIZED HEALTH CARE EDUCATION/TRAINING PROGRAM

## 2021-04-16 RX ORDER — SODIUM CHLORIDE 9 MG/ML
INJECTION, SOLUTION INTRAVENOUS ONCE
Status: DISCONTINUED | OUTPATIENT
Start: 2021-04-16 | End: 2021-04-17 | Stop reason: HOSPADM

## 2021-04-16 RX ORDER — ONDANSETRON 2 MG/ML
4 INJECTION INTRAMUSCULAR; INTRAVENOUS EVERY 6 HOURS PRN
Status: DISCONTINUED | OUTPATIENT
Start: 2021-04-16 | End: 2021-04-17 | Stop reason: HOSPADM

## 2021-04-16 RX ORDER — HYDRALAZINE HYDROCHLORIDE 20 MG/ML
INJECTION INTRAMUSCULAR; INTRAVENOUS CODE/TRAUMA/SEDATION MEDICATION
Status: COMPLETED | OUTPATIENT
Start: 2021-04-16 | End: 2021-04-16

## 2021-04-16 RX ORDER — FENTANYL CITRATE 50 UG/ML
INJECTION, SOLUTION INTRAMUSCULAR; INTRAVENOUS CODE/TRAUMA/SEDATION MEDICATION
Status: COMPLETED | OUTPATIENT
Start: 2021-04-16 | End: 2021-04-16

## 2021-04-16 RX ORDER — FENTANYL CITRATE 50 UG/ML
50 INJECTION, SOLUTION INTRAMUSCULAR; INTRAVENOUS
Status: DISCONTINUED | OUTPATIENT
Start: 2021-04-16 | End: 2021-04-17 | Stop reason: HOSPADM

## 2021-04-16 RX ORDER — MIDAZOLAM HYDROCHLORIDE 1 MG/ML
INJECTION INTRAMUSCULAR; INTRAVENOUS CODE/TRAUMA/SEDATION MEDICATION
Status: COMPLETED | OUTPATIENT
Start: 2021-04-16 | End: 2021-04-16

## 2021-04-16 RX ORDER — LIDOCAINE HYDROCHLORIDE 10 MG/ML
INJECTION INFILTRATION; PERINEURAL CODE/TRAUMA/SEDATION MEDICATION
Status: COMPLETED | OUTPATIENT
Start: 2021-04-16 | End: 2021-04-16

## 2021-04-16 RX ORDER — MIDAZOLAM HYDROCHLORIDE 1 MG/ML
1 INJECTION INTRAMUSCULAR; INTRAVENOUS
Status: DISCONTINUED | OUTPATIENT
Start: 2021-04-16 | End: 2021-04-17 | Stop reason: HOSPADM

## 2021-04-16 RX ADMIN — LIDOCAINE HYDROCHLORIDE 5 ML: 10 INJECTION, SOLUTION INFILTRATION; PERINEURAL at 09:04

## 2021-04-16 RX ADMIN — MIDAZOLAM HYDROCHLORIDE 1 MG: 1 INJECTION, SOLUTION INTRAMUSCULAR; INTRAVENOUS at 09:04

## 2021-04-16 RX ADMIN — HYDRALAZINE HYDROCHLORIDE 10 MG: 20 INJECTION INTRAMUSCULAR; INTRAVENOUS at 09:04

## 2021-04-16 RX ADMIN — FENTANYL CITRATE 50 MCG: 50 INJECTION, SOLUTION INTRAMUSCULAR; INTRAVENOUS at 09:04

## 2021-04-16 RX ADMIN — DESMOPRESSIN ACETATE 15.99 MCG: 4 SOLUTION INTRAVENOUS at 09:04

## 2021-04-16 RX ADMIN — GELATIN ABSORBABLE SPONGE 12-7 MM 1 EACH: 12-7 MISC at 09:04

## 2021-04-19 ENCOUNTER — TELEPHONE (OUTPATIENT)
Dept: NEPHROLOGY | Facility: CLINIC | Age: 61
End: 2021-04-19

## 2021-04-23 ENCOUNTER — TELEPHONE (OUTPATIENT)
Dept: TRANSPLANT | Facility: CLINIC | Age: 61
End: 2021-04-23

## 2021-04-23 LAB
FINAL PATHOLOGIC DIAGNOSIS: NORMAL
GROSS: NORMAL
Lab: NORMAL

## 2021-04-26 ENCOUNTER — TELEPHONE (OUTPATIENT)
Dept: TRANSPLANT | Facility: CLINIC | Age: 61
End: 2021-04-26

## 2021-04-26 ENCOUNTER — HOSPITAL ENCOUNTER (INPATIENT)
Facility: HOSPITAL | Age: 61
LOS: 15 days | Discharge: HOME | DRG: 205 | End: 2021-05-11
Attending: INTERNAL MEDICINE | Admitting: INTERNAL MEDICINE
Payer: MEDICARE

## 2021-04-26 DIAGNOSIS — Z94.2 LUNG REPLACED BY TRANSPLANT: ICD-10-CM

## 2021-04-26 DIAGNOSIS — Z94.2 LUNG TRANSPLANT RECIPIENT: ICD-10-CM

## 2021-04-26 DIAGNOSIS — T86.810 ANTIBODY MEDIATED REJECTION OF LUNG TRANSPLANT: ICD-10-CM

## 2021-04-26 DIAGNOSIS — T86.810 REJECTION OF LUNG TRANSPLANT: ICD-10-CM

## 2021-04-26 DIAGNOSIS — E11.22 CKD STAGE 4 DUE TO TYPE 2 DIABETES MELLITUS: ICD-10-CM

## 2021-04-26 DIAGNOSIS — Z79.2 PROPHYLACTIC ANTIBIOTIC: ICD-10-CM

## 2021-04-26 DIAGNOSIS — R53.81 PHYSICAL DECONDITIONING: ICD-10-CM

## 2021-04-26 DIAGNOSIS — N17.9 AKI (ACUTE KIDNEY INJURY): ICD-10-CM

## 2021-04-26 DIAGNOSIS — N18.4 CKD (CHRONIC KIDNEY DISEASE) STAGE 4, GFR 15-29 ML/MIN: ICD-10-CM

## 2021-04-26 DIAGNOSIS — N18.4 CKD STAGE 4 DUE TO TYPE 2 DIABETES MELLITUS: ICD-10-CM

## 2021-04-26 DIAGNOSIS — E11.65 TYPE 2 DIABETES MELLITUS WITH HYPERGLYCEMIA, WITH LONG-TERM CURRENT USE OF INSULIN: Chronic | ICD-10-CM

## 2021-04-26 DIAGNOSIS — Z79.4 TYPE 2 DIABETES MELLITUS WITH HYPERGLYCEMIA, WITH LONG-TERM CURRENT USE OF INSULIN: Chronic | ICD-10-CM

## 2021-04-26 DIAGNOSIS — D84.9 IMMUNOSUPPRESSION: ICD-10-CM

## 2021-04-26 DIAGNOSIS — N18.5 CKD (CHRONIC KIDNEY DISEASE) STAGE 5, GFR LESS THAN 15 ML/MIN: ICD-10-CM

## 2021-04-26 DIAGNOSIS — T86.810 ACUTE REJECTION OF LUNG TRANSPLANT: Primary | ICD-10-CM

## 2021-04-26 LAB
ALBUMIN SERPL BCP-MCNC: 2.6 G/DL (ref 3.5–5.2)
ALP SERPL-CCNC: 76 U/L (ref 55–135)
ALT SERPL W/O P-5'-P-CCNC: 10 U/L (ref 10–44)
ANION GAP SERPL CALC-SCNC: 11 MMOL/L (ref 8–16)
AST SERPL-CCNC: 13 U/L (ref 10–40)
BASOPHILS # BLD AUTO: 0.12 K/UL (ref 0–0.2)
BASOPHILS NFR BLD: 1.1 % (ref 0–1.9)
BILIRUB SERPL-MCNC: 0.3 MG/DL (ref 0.1–1)
BUN SERPL-MCNC: 91 MG/DL (ref 6–20)
CALCIUM SERPL-MCNC: 8.2 MG/DL (ref 8.7–10.5)
CHLORIDE SERPL-SCNC: 105 MMOL/L (ref 95–110)
CO2 SERPL-SCNC: 17 MMOL/L (ref 23–29)
CREAT SERPL-MCNC: 10.1 MG/DL (ref 0.5–1.4)
DIFFERENTIAL METHOD: ABNORMAL
EOSINOPHIL # BLD AUTO: 0.6 K/UL (ref 0–0.5)
EOSINOPHIL NFR BLD: 5.8 % (ref 0–8)
ERYTHROCYTE [DISTWIDTH] IN BLOOD BY AUTOMATED COUNT: 12.9 % (ref 11.5–14.5)
EST. GFR  (AFRICAN AMERICAN): 5.8 ML/MIN/1.73 M^2
EST. GFR  (NON AFRICAN AMERICAN): 5 ML/MIN/1.73 M^2
GLUCOSE SERPL-MCNC: 306 MG/DL (ref 70–110)
HCT VFR BLD AUTO: 24.6 % (ref 40–54)
HGB BLD-MCNC: 8 G/DL (ref 14–18)
IMM GRANULOCYTES # BLD AUTO: 0.07 K/UL (ref 0–0.04)
IMM GRANULOCYTES NFR BLD AUTO: 0.7 % (ref 0–0.5)
LYMPHOCYTES # BLD AUTO: 1.4 K/UL (ref 1–4.8)
LYMPHOCYTES NFR BLD: 13.5 % (ref 18–48)
MAGNESIUM SERPL-MCNC: 1.6 MG/DL (ref 1.6–2.6)
MCH RBC QN AUTO: 29 PG (ref 27–31)
MCHC RBC AUTO-ENTMCNC: 32.5 G/DL (ref 32–36)
MCV RBC AUTO: 89 FL (ref 82–98)
MONOCYTES # BLD AUTO: 0.8 K/UL (ref 0.3–1)
MONOCYTES NFR BLD: 7.1 % (ref 4–15)
NEUTROPHILS # BLD AUTO: 7.5 K/UL (ref 1.8–7.7)
NEUTROPHILS NFR BLD: 71.8 % (ref 38–73)
NRBC BLD-RTO: 0 /100 WBC
PLATELET # BLD AUTO: 385 K/UL (ref 150–450)
PMV BLD AUTO: 9 FL (ref 9.2–12.9)
POCT GLUCOSE: 234 MG/DL (ref 70–110)
POCT GLUCOSE: 302 MG/DL (ref 70–110)
POCT GLUCOSE: 324 MG/DL (ref 70–110)
POCT GLUCOSE: 335 MG/DL (ref 70–110)
POTASSIUM SERPL-SCNC: 5.1 MMOL/L (ref 3.5–5.1)
PROT SERPL-MCNC: 6.9 G/DL (ref 6–8.4)
RBC # BLD AUTO: 2.76 M/UL (ref 4.6–6.2)
SARS-COV-2 RDRP RESP QL NAA+PROBE: NEGATIVE
SODIUM SERPL-SCNC: 133 MMOL/L (ref 136–145)
WBC # BLD AUTO: 10.49 K/UL (ref 3.9–12.7)

## 2021-04-26 PROCEDURE — 86977 RBC SERUM PRETX INCUBJ/INHIB: CPT | Mod: 59 | Performed by: PHYSICIAN ASSISTANT

## 2021-04-26 PROCEDURE — 86704 HEP B CORE ANTIBODY TOTAL: CPT | Performed by: INTERNAL MEDICINE

## 2021-04-26 PROCEDURE — 86832 HLA CLASS I HIGH DEFIN QUAL: CPT | Mod: 59 | Performed by: PHYSICIAN ASSISTANT

## 2021-04-26 PROCEDURE — 80053 COMPREHEN METABOLIC PANEL: CPT | Performed by: INTERNAL MEDICINE

## 2021-04-26 PROCEDURE — 25000003 PHARM REV CODE 250: Performed by: PHYSICIAN ASSISTANT

## 2021-04-26 PROCEDURE — 63600175 PHARM REV CODE 636 W HCPCS: Performed by: NURSE PRACTITIONER

## 2021-04-26 PROCEDURE — 99222 1ST HOSP IP/OBS MODERATE 55: CPT | Mod: 25,AI,, | Performed by: INTERNAL MEDICINE

## 2021-04-26 PROCEDURE — 83735 ASSAY OF MAGNESIUM: CPT | Performed by: INTERNAL MEDICINE

## 2021-04-26 PROCEDURE — 99222 1ST HOSP IP/OBS MODERATE 55: CPT | Mod: ,,, | Performed by: NURSE PRACTITIONER

## 2021-04-26 PROCEDURE — 25000003 PHARM REV CODE 250: Performed by: NURSE PRACTITIONER

## 2021-04-26 PROCEDURE — 86833 HLA CLASS II HIGH DEFIN QUAL: CPT | Mod: 59 | Performed by: PHYSICIAN ASSISTANT

## 2021-04-26 PROCEDURE — 86833 HLA CLASS II HIGH DEFIN QUAL: CPT | Performed by: PHYSICIAN ASSISTANT

## 2021-04-26 PROCEDURE — 0118U TRNSPLJ DON-DRV CLL-FR DNA: CPT | Performed by: PHYSICIAN ASSISTANT

## 2021-04-26 PROCEDURE — 25000003 PHARM REV CODE 250: Performed by: INTERNAL MEDICINE

## 2021-04-26 PROCEDURE — 86832 HLA CLASS I HIGH DEFIN QUAL: CPT | Performed by: PHYSICIAN ASSISTANT

## 2021-04-26 PROCEDURE — 85025 COMPLETE CBC W/AUTO DIFF WBC: CPT | Performed by: INTERNAL MEDICINE

## 2021-04-26 PROCEDURE — 20600001 HC STEP DOWN PRIVATE ROOM

## 2021-04-26 PROCEDURE — 99222 PR INITIAL HOSPITAL CARE,LEVL II: ICD-10-PCS | Mod: 25,AI,, | Performed by: INTERNAL MEDICINE

## 2021-04-26 PROCEDURE — 63600175 PHARM REV CODE 636 W HCPCS: Performed by: PHYSICIAN ASSISTANT

## 2021-04-26 PROCEDURE — 99222 PR INITIAL HOSPITAL CARE,LEVL II: ICD-10-PCS | Mod: ,,, | Performed by: NURSE PRACTITIONER

## 2021-04-26 PROCEDURE — U0002 COVID-19 LAB TEST NON-CDC: HCPCS | Performed by: INTERNAL MEDICINE

## 2021-04-26 RX ORDER — ROSUVASTATIN CALCIUM 10 MG/1
10 TABLET, COATED ORAL DAILY
Status: DISCONTINUED | OUTPATIENT
Start: 2021-04-26 | End: 2021-05-03

## 2021-04-26 RX ORDER — INSULIN ASPART 100 [IU]/ML
0-5 INJECTION, SOLUTION INTRAVENOUS; SUBCUTANEOUS
Status: DISCONTINUED | OUTPATIENT
Start: 2021-04-26 | End: 2021-04-26

## 2021-04-26 RX ORDER — GLUCAGON 1 MG
1 KIT INJECTION
Status: DISCONTINUED | OUTPATIENT
Start: 2021-04-26 | End: 2021-04-26

## 2021-04-26 RX ORDER — INSULIN ASPART 100 [IU]/ML
15 INJECTION, SOLUTION INTRAVENOUS; SUBCUTANEOUS
Status: DISCONTINUED | OUTPATIENT
Start: 2021-04-26 | End: 2021-04-28

## 2021-04-26 RX ORDER — IBUPROFEN 200 MG
16 TABLET ORAL
Status: DISCONTINUED | OUTPATIENT
Start: 2021-04-26 | End: 2021-04-29

## 2021-04-26 RX ORDER — IBUPROFEN 200 MG
24 TABLET ORAL
Status: DISCONTINUED | OUTPATIENT
Start: 2021-04-26 | End: 2021-04-26

## 2021-04-26 RX ORDER — SODIUM POLYSTYRENE SULFONATE 4.1 MEQ/G
15 POWDER, FOR SUSPENSION ORAL; RECTAL DAILY PRN
Status: DISCONTINUED | OUTPATIENT
Start: 2021-04-26 | End: 2021-04-28

## 2021-04-26 RX ORDER — TACROLIMUS 1 MG/1
2 CAPSULE ORAL EVERY MORNING
Status: DISCONTINUED | OUTPATIENT
Start: 2021-04-26 | End: 2021-05-06

## 2021-04-26 RX ORDER — ACETAMINOPHEN 500 MG
1000 TABLET ORAL EVERY 6 HOURS PRN
Status: DISCONTINUED | OUTPATIENT
Start: 2021-04-26 | End: 2021-05-11 | Stop reason: HOSPADM

## 2021-04-26 RX ORDER — HEPARIN SODIUM 1000 [USP'U]/ML
3000 INJECTION, SOLUTION INTRAVENOUS; SUBCUTANEOUS ONCE
Status: COMPLETED | OUTPATIENT
Start: 2021-04-27 | End: 2021-04-27

## 2021-04-26 RX ORDER — ALBUMIN HUMAN 50 G/1000ML
225 SOLUTION INTRAVENOUS ONCE
Status: COMPLETED | OUTPATIENT
Start: 2021-04-27 | End: 2021-04-27

## 2021-04-26 RX ORDER — IBUPROFEN 200 MG
24 TABLET ORAL
Status: DISCONTINUED | OUTPATIENT
Start: 2021-04-26 | End: 2021-04-29

## 2021-04-26 RX ORDER — CLONIDINE HYDROCHLORIDE 0.1 MG/1
0.1 TABLET ORAL 3 TIMES DAILY
Status: DISCONTINUED | OUTPATIENT
Start: 2021-04-26 | End: 2021-05-05

## 2021-04-26 RX ORDER — INSULIN ASPART 100 [IU]/ML
0-10 INJECTION, SOLUTION INTRAVENOUS; SUBCUTANEOUS
Status: DISCONTINUED | OUTPATIENT
Start: 2021-04-26 | End: 2021-04-29

## 2021-04-26 RX ORDER — AZITHROMYCIN 250 MG/1
250 TABLET, FILM COATED ORAL
Status: DISCONTINUED | OUTPATIENT
Start: 2021-04-26 | End: 2021-05-11 | Stop reason: HOSPADM

## 2021-04-26 RX ORDER — SULFAMETHOXAZOLE AND TRIMETHOPRIM 400; 80 MG/1; MG/1
1 TABLET ORAL
Status: DISCONTINUED | OUTPATIENT
Start: 2021-04-26 | End: 2021-04-28

## 2021-04-26 RX ORDER — FAMOTIDINE 20 MG/1
20 TABLET, FILM COATED ORAL DAILY
Status: DISCONTINUED | OUTPATIENT
Start: 2021-04-26 | End: 2021-05-11 | Stop reason: HOSPADM

## 2021-04-26 RX ORDER — MUPIROCIN 20 MG/G
OINTMENT TOPICAL 2 TIMES DAILY
Status: COMPLETED | OUTPATIENT
Start: 2021-04-26 | End: 2021-05-01

## 2021-04-26 RX ORDER — LEVALBUTEROL 1.25 MG/.5ML
1.25 SOLUTION, CONCENTRATE RESPIRATORY (INHALATION) EVERY 8 HOURS PRN
Status: DISCONTINUED | OUTPATIENT
Start: 2021-04-26 | End: 2021-04-28

## 2021-04-26 RX ORDER — ENOXAPARIN SODIUM 100 MG/ML
30 INJECTION SUBCUTANEOUS EVERY 24 HOURS
Status: DISCONTINUED | OUTPATIENT
Start: 2021-04-26 | End: 2021-04-30

## 2021-04-26 RX ORDER — SODIUM BICARBONATE 650 MG/1
1300 TABLET ORAL 2 TIMES DAILY
Status: DISCONTINUED | OUTPATIENT
Start: 2021-04-26 | End: 2021-05-11 | Stop reason: HOSPADM

## 2021-04-26 RX ORDER — FAMOTIDINE 20 MG/1
20 TABLET, FILM COATED ORAL 2 TIMES DAILY
Status: DISCONTINUED | OUTPATIENT
Start: 2021-04-26 | End: 2021-04-26

## 2021-04-26 RX ORDER — MAGNESIUM SULFATE HEPTAHYDRATE 40 MG/ML
2 INJECTION, SOLUTION INTRAVENOUS
Status: CANCELLED | OUTPATIENT
Start: 2021-04-26

## 2021-04-26 RX ORDER — ASPIRIN 81 MG/1
81 TABLET ORAL DAILY
Status: DISCONTINUED | OUTPATIENT
Start: 2021-04-26 | End: 2021-05-11 | Stop reason: HOSPADM

## 2021-04-26 RX ORDER — IBUPROFEN 200 MG
16 TABLET ORAL
Status: DISCONTINUED | OUTPATIENT
Start: 2021-04-26 | End: 2021-04-26

## 2021-04-26 RX ORDER — FUROSEMIDE 40 MG/1
40 TABLET ORAL DAILY PRN
Status: DISCONTINUED | OUTPATIENT
Start: 2021-04-26 | End: 2021-05-11 | Stop reason: HOSPADM

## 2021-04-26 RX ORDER — GLUCAGON 1 MG
1 KIT INJECTION
Status: DISCONTINUED | OUTPATIENT
Start: 2021-04-26 | End: 2021-04-29

## 2021-04-26 RX ADMIN — TACROLIMUS 1.5 MG: 1 CAPSULE ORAL at 05:04

## 2021-04-26 RX ADMIN — ENOXAPARIN SODIUM 30 MG: 30 INJECTION SUBCUTANEOUS at 05:04

## 2021-04-26 RX ADMIN — METHYLPREDNISOLONE SODIUM SUCCINATE 1617 MG: 1 INJECTION, POWDER, LYOPHILIZED, FOR SOLUTION INTRAMUSCULAR; INTRAVENOUS at 03:04

## 2021-04-26 RX ADMIN — INSULIN ASPART 4 UNITS: 100 INJECTION, SOLUTION INTRAVENOUS; SUBCUTANEOUS at 06:04

## 2021-04-26 RX ADMIN — INSULIN ASPART 15 UNITS: 100 INJECTION, SOLUTION INTRAVENOUS; SUBCUTANEOUS at 06:04

## 2021-04-26 RX ADMIN — MAGNESIUM 64 MG (MAGNESIUM CHLORIDE) TABLET,DELAYED RELEASE 128 MG: at 08:04

## 2021-04-26 RX ADMIN — INSULIN ASPART 8 UNITS: 100 INJECTION, SOLUTION INTRAVENOUS; SUBCUTANEOUS at 09:04

## 2021-04-26 RX ADMIN — SODIUM BICARBONATE 650 MG TABLET 1300 MG: at 02:04

## 2021-04-26 RX ADMIN — CLONIDINE HYDROCHLORIDE 0.1 MG: 0.1 TABLET ORAL at 02:04

## 2021-04-26 RX ADMIN — SODIUM CHLORIDE 1.5 UNITS/HR: 9 INJECTION, SOLUTION INTRAVENOUS at 05:04

## 2021-04-26 RX ADMIN — INSULIN ASPART 4 UNITS: 100 INJECTION, SOLUTION INTRAVENOUS; SUBCUTANEOUS at 01:04

## 2021-04-26 RX ADMIN — MUPIROCIN: 20 OINTMENT TOPICAL at 08:04

## 2021-04-26 RX ADMIN — CLONIDINE HYDROCHLORIDE 0.1 MG: 0.1 TABLET ORAL at 08:04

## 2021-04-26 RX ADMIN — SODIUM BICARBONATE 650 MG TABLET 1300 MG: at 08:04

## 2021-04-27 ENCOUNTER — TELEPHONE (OUTPATIENT)
Dept: NEPHROLOGY | Facility: CLINIC | Age: 61
End: 2021-04-27

## 2021-04-27 LAB
ALBUMIN SERPL BCP-MCNC: 2.6 G/DL (ref 3.5–5.2)
ALP SERPL-CCNC: 83 U/L (ref 55–135)
ALT SERPL W/O P-5'-P-CCNC: 11 U/L (ref 10–44)
ANION GAP SERPL CALC-SCNC: 10 MMOL/L (ref 8–16)
ANISOCYTOSIS BLD QL SMEAR: SLIGHT
AST SERPL-CCNC: 12 U/L (ref 10–40)
BASOPHILS # BLD AUTO: 0.04 K/UL (ref 0–0.2)
BASOPHILS NFR BLD: 0.4 % (ref 0–1.9)
BILIRUB SERPL-MCNC: 0.3 MG/DL (ref 0.1–1)
BUN SERPL-MCNC: 94 MG/DL (ref 6–20)
CALCIUM SERPL-MCNC: 8.8 MG/DL (ref 8.7–10.5)
CHLORIDE SERPL-SCNC: 107 MMOL/L (ref 95–110)
CLASS I ANTIBODIES - LUMINEX: NEGATIVE
CLASS I ANTIBODY COMMENTS - LUMINEX: NORMAL
CLASS II ANTIBODIES - LUMINEX: NORMAL
CLASS II ANTIBODY COMMENTS - LUMINEX: NORMAL
CO2 SERPL-SCNC: 18 MMOL/L (ref 23–29)
CREAT SERPL-MCNC: 10.5 MG/DL (ref 0.5–1.4)
DIFFERENTIAL METHOD: ABNORMAL
DSA1 TESTING DATE: NORMAL
DSA12 TESTING DATE: NORMAL
DSA2 TESTING DATE: NORMAL
EOSINOPHIL # BLD AUTO: 0 K/UL (ref 0–0.5)
EOSINOPHIL NFR BLD: 0.1 % (ref 0–8)
ERYTHROCYTE [DISTWIDTH] IN BLOOD BY AUTOMATED COUNT: 12.7 % (ref 11.5–14.5)
EST. GFR  (AFRICAN AMERICAN): 5.6 ML/MIN/1.73 M^2
EST. GFR  (NON AFRICAN AMERICAN): 4.8 ML/MIN/1.73 M^2
GLUCOSE SERPL-MCNC: 166 MG/DL (ref 70–110)
HBV CORE AB SERPL QL IA: NEGATIVE
HCT VFR BLD AUTO: 26 % (ref 40–54)
HGB BLD-MCNC: 8.4 G/DL (ref 14–18)
HYPOCHROMIA BLD QL SMEAR: ABNORMAL
IMM GRANULOCYTES # BLD AUTO: 0.06 K/UL (ref 0–0.04)
IMM GRANULOCYTES NFR BLD AUTO: 0.6 % (ref 0–0.5)
LYMPHOCYTES # BLD AUTO: 1.3 K/UL (ref 1–4.8)
LYMPHOCYTES NFR BLD: 13 % (ref 18–48)
MAGNESIUM SERPL-MCNC: 1.9 MG/DL (ref 1.6–2.6)
MCH RBC QN AUTO: 28.7 PG (ref 27–31)
MCHC RBC AUTO-ENTMCNC: 32.3 G/DL (ref 32–36)
MCV RBC AUTO: 89 FL (ref 82–98)
MONOCYTES # BLD AUTO: 0.1 K/UL (ref 0.3–1)
MONOCYTES NFR BLD: 0.9 % (ref 4–15)
NEUTROPHILS # BLD AUTO: 8.3 K/UL (ref 1.8–7.7)
NEUTROPHILS NFR BLD: 85 % (ref 38–73)
NRBC BLD-RTO: 0 /100 WBC
OVALOCYTES BLD QL SMEAR: ABNORMAL
PLATELET # BLD AUTO: 378 K/UL (ref 150–450)
PMV BLD AUTO: 9 FL (ref 9.2–12.9)
POCT GLUCOSE: 218 MG/DL (ref 70–110)
POCT GLUCOSE: 244 MG/DL (ref 70–110)
POCT GLUCOSE: 293 MG/DL (ref 70–110)
POCT GLUCOSE: 323 MG/DL (ref 70–110)
POCT GLUCOSE: 323 MG/DL (ref 70–110)
POIKILOCYTOSIS BLD QL SMEAR: SLIGHT
POLYCHROMASIA BLD QL SMEAR: ABNORMAL
POTASSIUM SERPL-SCNC: 5.4 MMOL/L (ref 3.5–5.1)
PROT SERPL-MCNC: 7.3 G/DL (ref 6–8.4)
RBC # BLD AUTO: 2.93 M/UL (ref 4.6–6.2)
SERUM COLLECTION DT - LUMINEX CLASS I: NORMAL
SERUM COLLECTION DT - LUMINEX CLASS II: NORMAL
SODIUM SERPL-SCNC: 135 MMOL/L (ref 136–145)
TACROLIMUS BLD-MCNC: 4.6 NG/ML (ref 5–15)
WBC # BLD AUTO: 9.76 K/UL (ref 3.9–12.7)

## 2021-04-27 PROCEDURE — 36514 APHERESIS PLASMA: CPT

## 2021-04-27 PROCEDURE — 99232 SBSQ HOSP IP/OBS MODERATE 35: CPT | Mod: ,,, | Performed by: NURSE PRACTITIONER

## 2021-04-27 PROCEDURE — P9045 ALBUMIN (HUMAN), 5%, 250 ML: HCPCS | Mod: JG | Performed by: PATHOLOGY

## 2021-04-27 PROCEDURE — 25000003 PHARM REV CODE 250: Performed by: NURSE PRACTITIONER

## 2021-04-27 PROCEDURE — 36514 PR THER APHERESIS,PLASMA PHERESIS: ICD-10-PCS | Mod: ,,, | Performed by: PATHOLOGY

## 2021-04-27 PROCEDURE — 99232 PR SUBSEQUENT HOSPITAL CARE,LEVL II: ICD-10-PCS | Mod: ,,, | Performed by: NURSE PRACTITIONER

## 2021-04-27 PROCEDURE — 63600175 PHARM REV CODE 636 W HCPCS: Performed by: PHYSICIAN ASSISTANT

## 2021-04-27 PROCEDURE — 80197 ASSAY OF TACROLIMUS: CPT | Performed by: PHYSICIAN ASSISTANT

## 2021-04-27 PROCEDURE — 36514 APHERESIS PLASMA: CPT | Mod: ,,, | Performed by: PATHOLOGY

## 2021-04-27 PROCEDURE — 63600175 PHARM REV CODE 636 W HCPCS: Performed by: NURSE PRACTITIONER

## 2021-04-27 PROCEDURE — 80502 PR  LAB PATHOLOGY CONSULT-COMPLETE: ICD-10-PCS | Mod: ,,, | Performed by: PATHOLOGY

## 2021-04-27 PROCEDURE — 80053 COMPREHEN METABOLIC PANEL: CPT | Performed by: INTERNAL MEDICINE

## 2021-04-27 PROCEDURE — 80502 PR  LAB PATHOLOGY CONSULT-COMPLETE: CPT | Mod: ,,, | Performed by: PATHOLOGY

## 2021-04-27 PROCEDURE — 25000003 PHARM REV CODE 250: Performed by: PHYSICIAN ASSISTANT

## 2021-04-27 PROCEDURE — 63600175 PHARM REV CODE 636 W HCPCS: Mod: JG | Performed by: PATHOLOGY

## 2021-04-27 PROCEDURE — 99232 SBSQ HOSP IP/OBS MODERATE 35: CPT | Mod: 25,,, | Performed by: PHYSICIAN ASSISTANT

## 2021-04-27 PROCEDURE — 85025 COMPLETE CBC W/AUTO DIFF WBC: CPT | Performed by: INTERNAL MEDICINE

## 2021-04-27 PROCEDURE — 20600001 HC STEP DOWN PRIVATE ROOM

## 2021-04-27 PROCEDURE — 99232 PR SUBSEQUENT HOSPITAL CARE,LEVL II: ICD-10-PCS | Mod: 25,,, | Performed by: PHYSICIAN ASSISTANT

## 2021-04-27 PROCEDURE — 83735 ASSAY OF MAGNESIUM: CPT | Performed by: INTERNAL MEDICINE

## 2021-04-27 PROCEDURE — 25000003 PHARM REV CODE 250: Performed by: PATHOLOGY

## 2021-04-27 RX ORDER — HEPARIN SODIUM 1000 [USP'U]/ML
2400 INJECTION, SOLUTION INTRAVENOUS; SUBCUTANEOUS ONCE
Status: COMPLETED | OUTPATIENT
Start: 2021-04-28 | End: 2021-04-28

## 2021-04-27 RX ORDER — ALBUMIN HUMAN 50 G/1000ML
225 SOLUTION INTRAVENOUS ONCE
Status: COMPLETED | OUTPATIENT
Start: 2021-04-28 | End: 2021-04-28

## 2021-04-27 RX ADMIN — INSULIN ASPART 15 UNITS: 100 INJECTION, SOLUTION INTRAVENOUS; SUBCUTANEOUS at 12:04

## 2021-04-27 RX ADMIN — CLONIDINE HYDROCHLORIDE 0.1 MG: 0.1 TABLET ORAL at 09:04

## 2021-04-27 RX ADMIN — CLONIDINE HYDROCHLORIDE 0.1 MG: 0.1 TABLET ORAL at 07:04

## 2021-04-27 RX ADMIN — SODIUM BICARBONATE 650 MG TABLET 1300 MG: at 09:04

## 2021-04-27 RX ADMIN — TACROLIMUS 2 MG: 1 CAPSULE ORAL at 09:04

## 2021-04-27 RX ADMIN — CLONIDINE HYDROCHLORIDE 0.1 MG: 0.1 TABLET ORAL at 02:04

## 2021-04-27 RX ADMIN — ENOXAPARIN SODIUM 30 MG: 30 INJECTION SUBCUTANEOUS at 05:04

## 2021-04-27 RX ADMIN — METHYLPREDNISOLONE SODIUM SUCCINATE 1500 MG: 1 INJECTION, POWDER, LYOPHILIZED, FOR SOLUTION INTRAMUSCULAR; INTRAVENOUS at 11:04

## 2021-04-27 RX ADMIN — FAMOTIDINE 20 MG: 20 TABLET ORAL at 09:04

## 2021-04-27 RX ADMIN — MAGNESIUM 64 MG (MAGNESIUM CHLORIDE) TABLET,DELAYED RELEASE 128 MG: at 09:04

## 2021-04-27 RX ADMIN — ALBUMIN (HUMAN) 225 G: 12.5 SOLUTION INTRAVENOUS at 09:04

## 2021-04-27 RX ADMIN — INSULIN ASPART 4 UNITS: 100 INJECTION, SOLUTION INTRAVENOUS; SUBCUTANEOUS at 01:04

## 2021-04-27 RX ADMIN — SODIUM CHLORIDE 2 UNITS/HR: 9 INJECTION, SOLUTION INTRAVENOUS at 09:04

## 2021-04-27 RX ADMIN — ROSUVASTATIN CALCIUM 10 MG: 10 TABLET, FILM COATED ORAL at 09:04

## 2021-04-27 RX ADMIN — ASPIRIN 81 MG: 81 TABLET, COATED ORAL at 09:04

## 2021-04-27 RX ADMIN — INSULIN ASPART 8 UNITS: 100 INJECTION, SOLUTION INTRAVENOUS; SUBCUTANEOUS at 12:04

## 2021-04-27 RX ADMIN — HEPARIN SODIUM 2400 UNITS: 1000 INJECTION, SOLUTION INTRAVENOUS; SUBCUTANEOUS at 10:04

## 2021-04-27 RX ADMIN — MUPIROCIN: 20 OINTMENT TOPICAL at 09:04

## 2021-04-27 RX ADMIN — INSULIN ASPART 6 UNITS: 100 INJECTION, SOLUTION INTRAVENOUS; SUBCUTANEOUS at 09:04

## 2021-04-27 RX ADMIN — INSULIN ASPART 4 UNITS: 100 INJECTION, SOLUTION INTRAVENOUS; SUBCUTANEOUS at 09:04

## 2021-04-27 RX ADMIN — SODIUM CHLORIDE 2.5 UNITS/HR: 9 INJECTION, SOLUTION INTRAVENOUS at 06:04

## 2021-04-27 RX ADMIN — INSULIN ASPART 15 UNITS: 100 INJECTION, SOLUTION INTRAVENOUS; SUBCUTANEOUS at 09:04

## 2021-04-27 RX ADMIN — INSULIN ASPART 15 UNITS: 100 INJECTION, SOLUTION INTRAVENOUS; SUBCUTANEOUS at 04:04

## 2021-04-27 RX ADMIN — INSULIN ASPART 8 UNITS: 100 INJECTION, SOLUTION INTRAVENOUS; SUBCUTANEOUS at 04:04

## 2021-04-27 RX ADMIN — CALCIUM GLUCONATE 2000 MG: 98 INJECTION, SOLUTION INTRAVENOUS at 09:04

## 2021-04-27 RX ADMIN — TACROLIMUS 1.5 MG: 1 CAPSULE ORAL at 05:04

## 2021-04-28 LAB
ALBUMIN SERPL BCP-MCNC: 3.7 G/DL (ref 3.5–5.2)
ALBUMIN SERPL BCP-MCNC: 3.8 G/DL (ref 3.5–5.2)
ALP SERPL-CCNC: 41 U/L (ref 55–135)
ALP SERPL-CCNC: 41 U/L (ref 55–135)
ALT SERPL W/O P-5'-P-CCNC: 5 U/L (ref 10–44)
ALT SERPL W/O P-5'-P-CCNC: 6 U/L (ref 10–44)
ANION GAP SERPL CALC-SCNC: 11 MMOL/L (ref 8–16)
ANION GAP SERPL CALC-SCNC: 11 MMOL/L (ref 8–16)
AST SERPL-CCNC: 6 U/L (ref 10–40)
AST SERPL-CCNC: 7 U/L (ref 10–40)
BASOPHILS # BLD AUTO: 0.04 K/UL (ref 0–0.2)
BASOPHILS NFR BLD: 0.2 % (ref 0–1.9)
BILIRUB SERPL-MCNC: 0.3 MG/DL (ref 0.1–1)
BILIRUB SERPL-MCNC: 0.3 MG/DL (ref 0.1–1)
BUN SERPL-MCNC: 113 MG/DL (ref 6–20)
BUN SERPL-MCNC: 113 MG/DL (ref 6–20)
CALCIUM SERPL-MCNC: 8 MG/DL (ref 8.7–10.5)
CALCIUM SERPL-MCNC: 8.1 MG/DL (ref 8.7–10.5)
CHLORIDE SERPL-SCNC: 108 MMOL/L (ref 95–110)
CHLORIDE SERPL-SCNC: 108 MMOL/L (ref 95–110)
CO2 SERPL-SCNC: 14 MMOL/L (ref 23–29)
CO2 SERPL-SCNC: 14 MMOL/L (ref 23–29)
CREAT SERPL-MCNC: 11 MG/DL (ref 0.5–1.4)
CREAT SERPL-MCNC: 11 MG/DL (ref 0.5–1.4)
DIFFERENTIAL METHOD: ABNORMAL
EOSINOPHIL # BLD AUTO: 0 K/UL (ref 0–0.5)
EOSINOPHIL NFR BLD: 0 % (ref 0–8)
ERYTHROCYTE [DISTWIDTH] IN BLOOD BY AUTOMATED COUNT: 13 % (ref 11.5–14.5)
EST. GFR  (AFRICAN AMERICAN): 5.3 ML/MIN/1.73 M^2
EST. GFR  (AFRICAN AMERICAN): 5.3 ML/MIN/1.73 M^2
EST. GFR  (NON AFRICAN AMERICAN): 4.5 ML/MIN/1.73 M^2
EST. GFR  (NON AFRICAN AMERICAN): 4.5 ML/MIN/1.73 M^2
GLUCOSE SERPL-MCNC: 264 MG/DL (ref 70–110)
GLUCOSE SERPL-MCNC: 274 MG/DL (ref 70–110)
HCT VFR BLD AUTO: 25.1 % (ref 40–54)
HGB BLD-MCNC: 8.4 G/DL (ref 14–18)
IMM GRANULOCYTES # BLD AUTO: 0.2 K/UL (ref 0–0.04)
IMM GRANULOCYTES NFR BLD AUTO: 1 % (ref 0–0.5)
LYMPHOCYTES # BLD AUTO: 1.5 K/UL (ref 1–4.8)
LYMPHOCYTES NFR BLD: 7.1 % (ref 18–48)
MAGNESIUM SERPL-MCNC: 1.9 MG/DL (ref 1.6–2.6)
MCH RBC QN AUTO: 29.1 PG (ref 27–31)
MCHC RBC AUTO-ENTMCNC: 33.5 G/DL (ref 32–36)
MCV RBC AUTO: 87 FL (ref 82–98)
MONOCYTES # BLD AUTO: 0.5 K/UL (ref 0.3–1)
MONOCYTES NFR BLD: 2.6 % (ref 4–15)
NEUTROPHILS # BLD AUTO: 18.1 K/UL (ref 1.8–7.7)
NEUTROPHILS NFR BLD: 89.1 % (ref 38–73)
NRBC BLD-RTO: 0 /100 WBC
PLATELET # BLD AUTO: 343 K/UL (ref 150–450)
PMV BLD AUTO: 9.3 FL (ref 9.2–12.9)
POCT GLUCOSE: 209 MG/DL (ref 70–110)
POCT GLUCOSE: 213 MG/DL (ref 70–110)
POCT GLUCOSE: 273 MG/DL (ref 70–110)
POTASSIUM SERPL-SCNC: 5.9 MMOL/L (ref 3.5–5.1)
POTASSIUM SERPL-SCNC: 5.9 MMOL/L (ref 3.5–5.1)
PROT SERPL-MCNC: 5.9 G/DL (ref 6–8.4)
PROT SERPL-MCNC: 5.9 G/DL (ref 6–8.4)
RBC # BLD AUTO: 2.89 M/UL (ref 4.6–6.2)
SODIUM SERPL-SCNC: 133 MMOL/L (ref 136–145)
SODIUM SERPL-SCNC: 133 MMOL/L (ref 136–145)
TACROLIMUS BLD-MCNC: 5.3 NG/ML (ref 5–15)
WBC # BLD AUTO: 20.35 K/UL (ref 3.9–12.7)

## 2021-04-28 PROCEDURE — 80053 COMPREHEN METABOLIC PANEL: CPT | Performed by: GENERAL PRACTICE

## 2021-04-28 PROCEDURE — 63600175 PHARM REV CODE 636 W HCPCS: Performed by: NURSE PRACTITIONER

## 2021-04-28 PROCEDURE — 80053 COMPREHEN METABOLIC PANEL: CPT | Mod: 91 | Performed by: INTERNAL MEDICINE

## 2021-04-28 PROCEDURE — 85025 COMPLETE CBC W/AUTO DIFF WBC: CPT | Performed by: INTERNAL MEDICINE

## 2021-04-28 PROCEDURE — 25000003 PHARM REV CODE 250: Performed by: NURSE PRACTITIONER

## 2021-04-28 PROCEDURE — P9045 ALBUMIN (HUMAN), 5%, 250 ML: HCPCS | Mod: JG | Performed by: PATHOLOGY

## 2021-04-28 PROCEDURE — 36514 APHERESIS PLASMA: CPT

## 2021-04-28 PROCEDURE — 83735 ASSAY OF MAGNESIUM: CPT | Performed by: INTERNAL MEDICINE

## 2021-04-28 PROCEDURE — 99233 PR SUBSEQUENT HOSPITAL CARE,LEVL III: ICD-10-PCS | Mod: ,,, | Performed by: INTERNAL MEDICINE

## 2021-04-28 PROCEDURE — 63600175 PHARM REV CODE 636 W HCPCS: Performed by: PHYSICIAN ASSISTANT

## 2021-04-28 PROCEDURE — 99232 PR SUBSEQUENT HOSPITAL CARE,LEVL II: ICD-10-PCS | Mod: ,,, | Performed by: NURSE PRACTITIONER

## 2021-04-28 PROCEDURE — 25000003 PHARM REV CODE 250: Performed by: PHYSICIAN ASSISTANT

## 2021-04-28 PROCEDURE — 99232 SBSQ HOSP IP/OBS MODERATE 35: CPT | Mod: ,,, | Performed by: NURSE PRACTITIONER

## 2021-04-28 PROCEDURE — 36514 PR THER APHERESIS,PLASMA PHERESIS: ICD-10-PCS | Mod: ,,, | Performed by: PATHOLOGY

## 2021-04-28 PROCEDURE — 63700000 PHARM REV CODE 250 ALT 637 W/O HCPCS: Performed by: PHYSICIAN ASSISTANT

## 2021-04-28 PROCEDURE — 36514 APHERESIS PLASMA: CPT | Mod: ,,, | Performed by: PATHOLOGY

## 2021-04-28 PROCEDURE — 80197 ASSAY OF TACROLIMUS: CPT | Performed by: PHYSICIAN ASSISTANT

## 2021-04-28 PROCEDURE — 25000003 PHARM REV CODE 250: Performed by: INTERNAL MEDICINE

## 2021-04-28 PROCEDURE — 63600175 PHARM REV CODE 636 W HCPCS: Mod: JG | Performed by: PATHOLOGY

## 2021-04-28 PROCEDURE — 25000003 PHARM REV CODE 250: Performed by: PATHOLOGY

## 2021-04-28 PROCEDURE — 20600001 HC STEP DOWN PRIVATE ROOM

## 2021-04-28 PROCEDURE — 99233 SBSQ HOSP IP/OBS HIGH 50: CPT | Mod: ,,, | Performed by: INTERNAL MEDICINE

## 2021-04-28 RX ORDER — SODIUM POLYSTYRENE SULFONATE 4.1 MEQ/G
15 POWDER, FOR SUSPENSION ORAL; RECTAL DAILY PRN
Status: DISCONTINUED | OUTPATIENT
Start: 2021-04-28 | End: 2021-04-28

## 2021-04-28 RX ORDER — ALBUMIN HUMAN 50 G/1000ML
225 SOLUTION INTRAVENOUS ONCE
Status: COMPLETED | OUTPATIENT
Start: 2021-04-29 | End: 2021-04-29

## 2021-04-28 RX ORDER — HEPARIN SODIUM 1000 [USP'U]/ML
2400 INJECTION, SOLUTION INTRAVENOUS; SUBCUTANEOUS ONCE
Status: COMPLETED | OUTPATIENT
Start: 2021-04-29 | End: 2021-04-29

## 2021-04-28 RX ORDER — INSULIN ASPART 100 [IU]/ML
20 INJECTION, SOLUTION INTRAVENOUS; SUBCUTANEOUS
Status: DISCONTINUED | OUTPATIENT
Start: 2021-04-28 | End: 2021-04-29

## 2021-04-28 RX ORDER — SODIUM POLYSTYRENE SULFONATE 4.1 MEQ/G
15 POWDER, FOR SUSPENSION ORAL; RECTAL DAILY
Status: DISCONTINUED | OUTPATIENT
Start: 2021-04-28 | End: 2021-04-28

## 2021-04-28 RX ADMIN — CLONIDINE HYDROCHLORIDE 0.1 MG: 0.1 TABLET ORAL at 08:04

## 2021-04-28 RX ADMIN — INSULIN ASPART 20 UNITS: 100 INJECTION, SOLUTION INTRAVENOUS; SUBCUTANEOUS at 12:04

## 2021-04-28 RX ADMIN — INSULIN ASPART 20 UNITS: 100 INJECTION, SOLUTION INTRAVENOUS; SUBCUTANEOUS at 05:04

## 2021-04-28 RX ADMIN — INSULIN ASPART 20 UNITS: 100 INJECTION, SOLUTION INTRAVENOUS; SUBCUTANEOUS at 09:04

## 2021-04-28 RX ADMIN — INSULIN ASPART 4 UNITS: 100 INJECTION, SOLUTION INTRAVENOUS; SUBCUTANEOUS at 09:04

## 2021-04-28 RX ADMIN — SODIUM BICARBONATE 650 MG TABLET 1300 MG: at 10:04

## 2021-04-28 RX ADMIN — INSULIN ASPART 6 UNITS: 100 INJECTION, SOLUTION INTRAVENOUS; SUBCUTANEOUS at 02:04

## 2021-04-28 RX ADMIN — INSULIN ASPART 4 UNITS: 100 INJECTION, SOLUTION INTRAVENOUS; SUBCUTANEOUS at 05:04

## 2021-04-28 RX ADMIN — INSULIN ASPART 4 UNITS: 100 INJECTION, SOLUTION INTRAVENOUS; SUBCUTANEOUS at 08:04

## 2021-04-28 RX ADMIN — SODIUM ZIRCONIUM CYCLOSILICATE 10 G: 10 POWDER, FOR SUSPENSION ORAL at 11:04

## 2021-04-28 RX ADMIN — INSULIN ASPART 4 UNITS: 100 INJECTION, SOLUTION INTRAVENOUS; SUBCUTANEOUS at 12:04

## 2021-04-28 RX ADMIN — AZITHROMYCIN MONOHYDRATE 250 MG: 250 TABLET ORAL at 05:04

## 2021-04-28 RX ADMIN — ROSUVASTATIN CALCIUM 10 MG: 10 TABLET, FILM COATED ORAL at 10:04

## 2021-04-28 RX ADMIN — ALBUMIN (HUMAN) 225 G: 12.5 SOLUTION INTRAVENOUS at 08:04

## 2021-04-28 RX ADMIN — METHYLPREDNISOLONE SODIUM SUCCINATE 1500 MG: 1 INJECTION, POWDER, LYOPHILIZED, FOR SOLUTION INTRAMUSCULAR; INTRAVENOUS at 10:04

## 2021-04-28 RX ADMIN — MUPIROCIN: 20 OINTMENT TOPICAL at 11:04

## 2021-04-28 RX ADMIN — ENOXAPARIN SODIUM 30 MG: 30 INJECTION SUBCUTANEOUS at 05:04

## 2021-04-28 RX ADMIN — SODIUM CHLORIDE 2.5 UNITS/HR: 9 INJECTION, SOLUTION INTRAVENOUS at 12:04

## 2021-04-28 RX ADMIN — TACROLIMUS 2 MG: 1 CAPSULE ORAL at 10:04

## 2021-04-28 RX ADMIN — CLONIDINE HYDROCHLORIDE 0.1 MG: 0.1 TABLET ORAL at 09:04

## 2021-04-28 RX ADMIN — MAGNESIUM 64 MG (MAGNESIUM CHLORIDE) TABLET,DELAYED RELEASE 128 MG: at 08:04

## 2021-04-28 RX ADMIN — CLONIDINE HYDROCHLORIDE 0.1 MG: 0.1 TABLET ORAL at 04:04

## 2021-04-28 RX ADMIN — ASPIRIN 81 MG: 81 TABLET, COATED ORAL at 10:04

## 2021-04-28 RX ADMIN — HEPARIN SODIUM 2400 UNITS: 1000 INJECTION, SOLUTION INTRAVENOUS; SUBCUTANEOUS at 09:04

## 2021-04-28 RX ADMIN — FAMOTIDINE 20 MG: 20 TABLET ORAL at 10:04

## 2021-04-28 RX ADMIN — SODIUM BICARBONATE 650 MG TABLET 1300 MG: at 08:04

## 2021-04-28 RX ADMIN — TACROLIMUS 1.5 MG: 1 CAPSULE ORAL at 05:04

## 2021-04-28 RX ADMIN — CALCIUM GLUCONATE 2000 MG: 98 INJECTION, SOLUTION INTRAVENOUS at 08:04

## 2021-04-28 RX ADMIN — MUPIROCIN: 20 OINTMENT TOPICAL at 08:04

## 2021-04-28 RX ADMIN — MAGNESIUM 64 MG (MAGNESIUM CHLORIDE) TABLET,DELAYED RELEASE 128 MG: at 10:04

## 2021-04-29 LAB
ALBUMIN SERPL BCP-MCNC: 4 G/DL (ref 3.5–5.2)
ALP SERPL-CCNC: 24 U/L (ref 55–135)
ALT SERPL W/O P-5'-P-CCNC: 5 U/L (ref 10–44)
ANION GAP SERPL CALC-SCNC: 12 MMOL/L (ref 8–16)
AST SERPL-CCNC: 6 U/L (ref 10–40)
BASOPHILS # BLD AUTO: 0.02 K/UL (ref 0–0.2)
BASOPHILS NFR BLD: 0.1 % (ref 0–1.9)
BILIRUB SERPL-MCNC: 0.3 MG/DL (ref 0.1–1)
BUN SERPL-MCNC: 116 MG/DL (ref 6–20)
CALCIUM SERPL-MCNC: 7.3 MG/DL (ref 8.7–10.5)
CHLORIDE SERPL-SCNC: 109 MMOL/L (ref 95–110)
CO2 SERPL-SCNC: 13 MMOL/L (ref 23–29)
CREAT SERPL-MCNC: 11.7 MG/DL (ref 0.5–1.4)
DIFFERENTIAL METHOD: ABNORMAL
EOSINOPHIL # BLD AUTO: 0 K/UL (ref 0–0.5)
EOSINOPHIL NFR BLD: 0 % (ref 0–8)
ERYTHROCYTE [DISTWIDTH] IN BLOOD BY AUTOMATED COUNT: 13.1 % (ref 11.5–14.5)
EST. GFR  (AFRICAN AMERICAN): 4.9 ML/MIN/1.73 M^2
EST. GFR  (NON AFRICAN AMERICAN): 4.2 ML/MIN/1.73 M^2
GLUCOSE SERPL-MCNC: 305 MG/DL (ref 70–110)
HCT VFR BLD AUTO: 26 % (ref 40–54)
HGB BLD-MCNC: 8.5 G/DL (ref 14–18)
IMM GRANULOCYTES # BLD AUTO: 0.31 K/UL (ref 0–0.04)
IMM GRANULOCYTES NFR BLD AUTO: 1.6 % (ref 0–0.5)
LYMPHOCYTES # BLD AUTO: 1.1 K/UL (ref 1–4.8)
LYMPHOCYTES NFR BLD: 5.8 % (ref 18–48)
MAGNESIUM SERPL-MCNC: 1.9 MG/DL (ref 1.6–2.6)
MCH RBC QN AUTO: 29.1 PG (ref 27–31)
MCHC RBC AUTO-ENTMCNC: 32.7 G/DL (ref 32–36)
MCV RBC AUTO: 89 FL (ref 82–98)
MONOCYTES # BLD AUTO: 0.4 K/UL (ref 0.3–1)
MONOCYTES NFR BLD: 2.3 % (ref 4–15)
NEUTROPHILS # BLD AUTO: 17 K/UL (ref 1.8–7.7)
NEUTROPHILS NFR BLD: 90.2 % (ref 38–73)
NRBC BLD-RTO: 0 /100 WBC
PLATELET # BLD AUTO: 334 K/UL (ref 150–450)
PMV BLD AUTO: 9.5 FL (ref 9.2–12.9)
POCT GLUCOSE: 236 MG/DL (ref 70–110)
POCT GLUCOSE: 256 MG/DL (ref 70–110)
POCT GLUCOSE: 262 MG/DL (ref 70–110)
POCT GLUCOSE: 266 MG/DL (ref 70–110)
POCT GLUCOSE: 312 MG/DL (ref 70–110)
POTASSIUM SERPL-SCNC: 5.6 MMOL/L (ref 3.5–5.1)
PROT SERPL-MCNC: 5.1 G/DL (ref 6–8.4)
RBC # BLD AUTO: 2.92 M/UL (ref 4.6–6.2)
SODIUM SERPL-SCNC: 134 MMOL/L (ref 136–145)
TACROLIMUS BLD-MCNC: 5.8 NG/ML (ref 5–15)
WBC # BLD AUTO: 18.88 K/UL (ref 3.9–12.7)

## 2021-04-29 PROCEDURE — 99232 SBSQ HOSP IP/OBS MODERATE 35: CPT | Mod: ,,, | Performed by: NURSE PRACTITIONER

## 2021-04-29 PROCEDURE — 99232 PR SUBSEQUENT HOSPITAL CARE,LEVL II: ICD-10-PCS | Mod: ,,, | Performed by: NURSE PRACTITIONER

## 2021-04-29 PROCEDURE — 25000003 PHARM REV CODE 250: Performed by: INTERNAL MEDICINE

## 2021-04-29 PROCEDURE — 83735 ASSAY OF MAGNESIUM: CPT | Performed by: INTERNAL MEDICINE

## 2021-04-29 PROCEDURE — 20600001 HC STEP DOWN PRIVATE ROOM

## 2021-04-29 PROCEDURE — 63600175 PHARM REV CODE 636 W HCPCS: Performed by: NURSE PRACTITIONER

## 2021-04-29 PROCEDURE — 99233 PR SUBSEQUENT HOSPITAL CARE,LEVL III: ICD-10-PCS | Mod: ,,, | Performed by: INTERNAL MEDICINE

## 2021-04-29 PROCEDURE — 25000003 PHARM REV CODE 250: Performed by: PATHOLOGY

## 2021-04-29 PROCEDURE — 36514 APHERESIS PLASMA: CPT | Mod: ,,, | Performed by: PATHOLOGY

## 2021-04-29 PROCEDURE — 63600175 PHARM REV CODE 636 W HCPCS: Performed by: PHYSICIAN ASSISTANT

## 2021-04-29 PROCEDURE — 25000003 PHARM REV CODE 250: Performed by: PHYSICIAN ASSISTANT

## 2021-04-29 PROCEDURE — 36514 PR THER APHERESIS,PLASMA PHERESIS: ICD-10-PCS | Mod: ,,, | Performed by: PATHOLOGY

## 2021-04-29 PROCEDURE — 80053 COMPREHEN METABOLIC PANEL: CPT | Performed by: INTERNAL MEDICINE

## 2021-04-29 PROCEDURE — 85025 COMPLETE CBC W/AUTO DIFF WBC: CPT | Performed by: INTERNAL MEDICINE

## 2021-04-29 PROCEDURE — 63600175 PHARM REV CODE 636 W HCPCS: Performed by: PATHOLOGY

## 2021-04-29 PROCEDURE — C9399 UNCLASSIFIED DRUGS OR BIOLOG: HCPCS | Performed by: NURSE PRACTITIONER

## 2021-04-29 PROCEDURE — 63600175 PHARM REV CODE 636 W HCPCS: Mod: JG | Performed by: INTERNAL MEDICINE

## 2021-04-29 PROCEDURE — 80197 ASSAY OF TACROLIMUS: CPT | Performed by: PHYSICIAN ASSISTANT

## 2021-04-29 PROCEDURE — 36514 APHERESIS PLASMA: CPT

## 2021-04-29 PROCEDURE — 99233 SBSQ HOSP IP/OBS HIGH 50: CPT | Mod: ,,, | Performed by: INTERNAL MEDICINE

## 2021-04-29 PROCEDURE — P9045 ALBUMIN (HUMAN), 5%, 250 ML: HCPCS | Mod: JG | Performed by: PATHOLOGY

## 2021-04-29 PROCEDURE — 25000003 PHARM REV CODE 250: Performed by: NURSE PRACTITIONER

## 2021-04-29 RX ORDER — LACTULOSE 10 G/15ML
20 SOLUTION ORAL 2 TIMES DAILY PRN
Status: DISCONTINUED | OUTPATIENT
Start: 2021-04-29 | End: 2021-05-11 | Stop reason: HOSPADM

## 2021-04-29 RX ORDER — METHYLPREDNISOLONE SOD SUCC 125 MG
125 VIAL (EA) INJECTION DAILY PRN
Status: DISCONTINUED | OUTPATIENT
Start: 2021-04-29 | End: 2021-05-11 | Stop reason: HOSPADM

## 2021-04-29 RX ORDER — DIPHENHYDRAMINE HCL 50 MG
50 CAPSULE ORAL DAILY PRN
Status: DISCONTINUED | OUTPATIENT
Start: 2021-04-29 | End: 2021-05-11 | Stop reason: HOSPADM

## 2021-04-29 RX ORDER — GLUCAGON 1 MG
1 KIT INJECTION
Status: DISCONTINUED | OUTPATIENT
Start: 2021-04-29 | End: 2021-05-11 | Stop reason: HOSPADM

## 2021-04-29 RX ORDER — INSULIN ASPART 100 [IU]/ML
0-5 INJECTION, SOLUTION INTRAVENOUS; SUBCUTANEOUS
Status: DISCONTINUED | OUTPATIENT
Start: 2021-04-29 | End: 2021-05-11 | Stop reason: HOSPADM

## 2021-04-29 RX ORDER — NIFEDIPINE 30 MG/1
30 TABLET, EXTENDED RELEASE ORAL DAILY
Status: DISCONTINUED | OUTPATIENT
Start: 2021-04-29 | End: 2021-05-01

## 2021-04-29 RX ORDER — SODIUM CHLORIDE 9 MG/ML
INJECTION, SOLUTION INTRAVENOUS ONCE
Status: COMPLETED | OUTPATIENT
Start: 2021-04-29 | End: 2021-05-08

## 2021-04-29 RX ORDER — ACETAMINOPHEN 325 MG/1
650 TABLET ORAL DAILY PRN
Status: DISCONTINUED | OUTPATIENT
Start: 2021-04-29 | End: 2021-05-11 | Stop reason: HOSPADM

## 2021-04-29 RX ORDER — ONDANSETRON 8 MG/1
8 TABLET, ORALLY DISINTEGRATING ORAL DAILY PRN
Status: DISCONTINUED | OUTPATIENT
Start: 2021-04-29 | End: 2021-05-11 | Stop reason: HOSPADM

## 2021-04-29 RX ORDER — IBUPROFEN 200 MG
16 TABLET ORAL
Status: DISCONTINUED | OUTPATIENT
Start: 2021-04-29 | End: 2021-05-11 | Stop reason: HOSPADM

## 2021-04-29 RX ORDER — INSULIN ASPART 100 [IU]/ML
14 INJECTION, SOLUTION INTRAVENOUS; SUBCUTANEOUS
Status: DISCONTINUED | OUTPATIENT
Start: 2021-04-29 | End: 2021-04-30

## 2021-04-29 RX ORDER — IBUPROFEN 200 MG
24 TABLET ORAL
Status: DISCONTINUED | OUTPATIENT
Start: 2021-04-29 | End: 2021-05-11 | Stop reason: HOSPADM

## 2021-04-29 RX ADMIN — INSULIN ASPART 20 UNITS: 100 INJECTION, SOLUTION INTRAVENOUS; SUBCUTANEOUS at 12:04

## 2021-04-29 RX ADMIN — SODIUM BICARBONATE 650 MG TABLET 1300 MG: at 08:04

## 2021-04-29 RX ADMIN — CLONIDINE HYDROCHLORIDE 0.1 MG: 0.1 TABLET ORAL at 08:04

## 2021-04-29 RX ADMIN — INSULIN ASPART 14 UNITS: 100 INJECTION, SOLUTION INTRAVENOUS; SUBCUTANEOUS at 05:04

## 2021-04-29 RX ADMIN — INSULIN ASPART 8 UNITS: 100 INJECTION, SOLUTION INTRAVENOUS; SUBCUTANEOUS at 08:04

## 2021-04-29 RX ADMIN — MAGNESIUM 64 MG (MAGNESIUM CHLORIDE) TABLET,DELAYED RELEASE 128 MG: at 08:04

## 2021-04-29 RX ADMIN — TACROLIMUS 1.5 MG: 1 CAPSULE ORAL at 05:04

## 2021-04-29 RX ADMIN — MUPIROCIN: 20 OINTMENT TOPICAL at 08:04

## 2021-04-29 RX ADMIN — INSULIN ASPART 4 UNITS: 100 INJECTION, SOLUTION INTRAVENOUS; SUBCUTANEOUS at 12:04

## 2021-04-29 RX ADMIN — SODIUM ZIRCONIUM CYCLOSILICATE 10 G: 10 POWDER, FOR SUSPENSION ORAL at 08:04

## 2021-04-29 RX ADMIN — ENOXAPARIN SODIUM 30 MG: 30 INJECTION SUBCUTANEOUS at 05:04

## 2021-04-29 RX ADMIN — SODIUM CHLORIDE 2.5 UNITS/HR: 9 INJECTION, SOLUTION INTRAVENOUS at 04:04

## 2021-04-29 RX ADMIN — INSULIN ASPART 6 UNITS: 100 INJECTION, SOLUTION INTRAVENOUS; SUBCUTANEOUS at 02:04

## 2021-04-29 RX ADMIN — INSULIN ASPART 1 UNITS: 100 INJECTION, SOLUTION INTRAVENOUS; SUBCUTANEOUS at 08:04

## 2021-04-29 RX ADMIN — INSULIN DETEMIR 42 UNITS: 100 INJECTION, SOLUTION SUBCUTANEOUS at 08:04

## 2021-04-29 RX ADMIN — TACROLIMUS 2 MG: 1 CAPSULE ORAL at 08:04

## 2021-04-29 RX ADMIN — HEPARIN SODIUM 2400 UNITS: 1000 INJECTION, SOLUTION INTRAVENOUS; SUBCUTANEOUS at 12:04

## 2021-04-29 RX ADMIN — INSULIN ASPART 20 UNITS: 100 INJECTION, SOLUTION INTRAVENOUS; SUBCUTANEOUS at 08:04

## 2021-04-29 RX ADMIN — CALCIUM GLUCONATE 2000 MG: 98 INJECTION, SOLUTION INTRAVENOUS at 10:04

## 2021-04-29 RX ADMIN — FAMOTIDINE 20 MG: 20 TABLET ORAL at 08:04

## 2021-04-29 RX ADMIN — CLONIDINE HYDROCHLORIDE 0.1 MG: 0.1 TABLET ORAL at 02:04

## 2021-04-29 RX ADMIN — ACETAMINOPHEN 650 MG: 325 TABLET ORAL at 08:04

## 2021-04-29 RX ADMIN — HUMAN IMMUNOGLOBULIN G 80 G: 40 LIQUID INTRAVENOUS at 09:04

## 2021-04-29 RX ADMIN — INSULIN ASPART 2 UNITS: 100 INJECTION, SOLUTION INTRAVENOUS; SUBCUTANEOUS at 05:04

## 2021-04-29 RX ADMIN — SODIUM ZIRCONIUM CYCLOSILICATE 10 G: 10 POWDER, FOR SUSPENSION ORAL at 11:04

## 2021-04-29 RX ADMIN — ROSUVASTATIN CALCIUM 10 MG: 10 TABLET, FILM COATED ORAL at 08:04

## 2021-04-29 RX ADMIN — ALBUMIN (HUMAN) 225 G: 12.5 SOLUTION INTRAVENOUS at 10:04

## 2021-04-29 RX ADMIN — DIPHENHYDRAMINE HYDROCHLORIDE 50 MG: 50 CAPSULE ORAL at 08:04

## 2021-04-29 RX ADMIN — ASPIRIN 81 MG: 81 TABLET, COATED ORAL at 08:04

## 2021-04-29 RX ADMIN — METHYLPREDNISOLONE SODIUM SUCCINATE 125 MG: 125 INJECTION, POWDER, FOR SOLUTION INTRAMUSCULAR; INTRAVENOUS at 08:04

## 2021-04-30 LAB
ALBUMIN SERPL BCP-MCNC: 3.8 G/DL (ref 3.5–5.2)
ALP SERPL-CCNC: 17 U/L (ref 55–135)
ALT SERPL W/O P-5'-P-CCNC: <5 U/L (ref 10–44)
ANION GAP SERPL CALC-SCNC: 11 MMOL/L (ref 8–16)
AST SERPL-CCNC: 7 U/L (ref 10–40)
BASOPHILS # BLD AUTO: 0.02 K/UL (ref 0–0.2)
BASOPHILS NFR BLD: 0.1 % (ref 0–1.9)
BILIRUB SERPL-MCNC: 0.2 MG/DL (ref 0.1–1)
BUN SERPL-MCNC: 115 MG/DL (ref 6–20)
C1Q1A TESTING DATE: NORMAL
C1Q2A TESTING DATE: NORMAL
CALCIUM SERPL-MCNC: 7 MG/DL (ref 8.7–10.5)
CHLORIDE SERPL-SCNC: 108 MMOL/L (ref 95–110)
CLASS I ANTIBODIES - LUMINEX: NEGATIVE
CLASS I ANTIBODY COMMENTS - LUMINEX: NORMAL
CLASS II ANTIBODIES - LUMINEX: NORMAL
CLASS II ANTIBODY COMMENTS - LUMINEX: NORMAL
CO2 SERPL-SCNC: 11 MMOL/L (ref 23–29)
CREAT SERPL-MCNC: 12.1 MG/DL (ref 0.5–1.4)
DIFFERENTIAL METHOD: ABNORMAL
EOSINOPHIL # BLD AUTO: 0 K/UL (ref 0–0.5)
EOSINOPHIL NFR BLD: 0 % (ref 0–8)
ERYTHROCYTE [DISTWIDTH] IN BLOOD BY AUTOMATED COUNT: 13.2 % (ref 11.5–14.5)
EST. GFR  (AFRICAN AMERICAN): 4.7 ML/MIN/1.73 M^2
EST. GFR  (NON AFRICAN AMERICAN): 4.1 ML/MIN/1.73 M^2
ESTIMATED AVG GLUCOSE: 183 MG/DL (ref 68–131)
GLUCOSE SERPL-MCNC: 209 MG/DL (ref 70–110)
HBA1C MFR BLD: 8 % (ref 4–5.6)
HC1QA TESTING DATE: NORMAL
HCT VFR BLD AUTO: 25.8 % (ref 40–54)
HGB BLD-MCNC: 8.1 G/DL (ref 14–18)
IMM GRANULOCYTES # BLD AUTO: 0.33 K/UL (ref 0–0.04)
IMM GRANULOCYTES NFR BLD AUTO: 1.8 % (ref 0–0.5)
LYMPHOCYTES # BLD AUTO: 0.7 K/UL (ref 1–4.8)
LYMPHOCYTES NFR BLD: 3.7 % (ref 18–48)
MAGNESIUM SERPL-MCNC: 1.9 MG/DL (ref 1.6–2.6)
MCH RBC QN AUTO: 28.2 PG (ref 27–31)
MCHC RBC AUTO-ENTMCNC: 31.4 G/DL (ref 32–36)
MCV RBC AUTO: 90 FL (ref 82–98)
MONOCYTES # BLD AUTO: 0.6 K/UL (ref 0.3–1)
MONOCYTES NFR BLD: 3.1 % (ref 4–15)
NEUTROPHILS # BLD AUTO: 16.9 K/UL (ref 1.8–7.7)
NEUTROPHILS NFR BLD: 91.3 % (ref 38–73)
NRBC BLD-RTO: 0 /100 WBC
PLATELET # BLD AUTO: 320 K/UL (ref 150–450)
PMV BLD AUTO: 9.4 FL (ref 9.2–12.9)
POCT GLUCOSE: 210 MG/DL (ref 70–110)
POCT GLUCOSE: 286 MG/DL (ref 70–110)
POCT GLUCOSE: 320 MG/DL (ref 70–110)
POCT GLUCOSE: 357 MG/DL (ref 70–110)
POTASSIUM SERPL-SCNC: 5.8 MMOL/L (ref 3.5–5.1)
PROT SERPL-MCNC: 6.2 G/DL (ref 6–8.4)
RBC # BLD AUTO: 2.87 M/UL (ref 4.6–6.2)
SERUM COLLECTION DT - LUMINEX CLASS I: NORMAL
SERUM COLLECTION DT - LUMINEX CLASS II: NORMAL
SODIUM SERPL-SCNC: 130 MMOL/L (ref 136–145)
TACROLIMUS BLD-MCNC: 5 NG/ML (ref 5–15)
WBC # BLD AUTO: 18.5 K/UL (ref 3.9–12.7)

## 2021-04-30 PROCEDURE — 25000003 PHARM REV CODE 250: Performed by: PHYSICIAN ASSISTANT

## 2021-04-30 PROCEDURE — 99233 PR SUBSEQUENT HOSPITAL CARE,LEVL III: ICD-10-PCS | Mod: GC,,, | Performed by: INTERNAL MEDICINE

## 2021-04-30 PROCEDURE — 90935 PR HEMODIALYSIS, ONE EVALUATION: ICD-10-PCS | Mod: ,,, | Performed by: INTERNAL MEDICINE

## 2021-04-30 PROCEDURE — 83735 ASSAY OF MAGNESIUM: CPT | Performed by: INTERNAL MEDICINE

## 2021-04-30 PROCEDURE — 63600175 PHARM REV CODE 636 W HCPCS: Performed by: STUDENT IN AN ORGANIZED HEALTH CARE EDUCATION/TRAINING PROGRAM

## 2021-04-30 PROCEDURE — 63600175 PHARM REV CODE 636 W HCPCS: Mod: JG | Performed by: INTERNAL MEDICINE

## 2021-04-30 PROCEDURE — 25000003 PHARM REV CODE 250: Performed by: INTERNAL MEDICINE

## 2021-04-30 PROCEDURE — 63600175 PHARM REV CODE 636 W HCPCS: Performed by: INTERNAL MEDICINE

## 2021-04-30 PROCEDURE — 90935 HEMODIALYSIS ONE EVALUATION: CPT

## 2021-04-30 PROCEDURE — 83036 HEMOGLOBIN GLYCOSYLATED A1C: CPT | Performed by: NURSE PRACTITIONER

## 2021-04-30 PROCEDURE — 63600175 PHARM REV CODE 636 W HCPCS: Performed by: NURSE PRACTITIONER

## 2021-04-30 PROCEDURE — 80053 COMPREHEN METABOLIC PANEL: CPT | Performed by: INTERNAL MEDICINE

## 2021-04-30 PROCEDURE — 63700000 PHARM REV CODE 250 ALT 637 W/O HCPCS: Performed by: PHYSICIAN ASSISTANT

## 2021-04-30 PROCEDURE — 99233 SBSQ HOSP IP/OBS HIGH 50: CPT | Mod: GC,,, | Performed by: INTERNAL MEDICINE

## 2021-04-30 PROCEDURE — 80197 ASSAY OF TACROLIMUS: CPT | Performed by: PHYSICIAN ASSISTANT

## 2021-04-30 PROCEDURE — 63600175 PHARM REV CODE 636 W HCPCS: Performed by: PHYSICIAN ASSISTANT

## 2021-04-30 PROCEDURE — 85025 COMPLETE CBC W/AUTO DIFF WBC: CPT | Performed by: INTERNAL MEDICINE

## 2021-04-30 PROCEDURE — 20600001 HC STEP DOWN PRIVATE ROOM

## 2021-04-30 PROCEDURE — 90935 HEMODIALYSIS ONE EVALUATION: CPT | Mod: ,,, | Performed by: INTERNAL MEDICINE

## 2021-04-30 RX ORDER — HEPARIN 100 UNIT/ML
500 SYRINGE INTRAVENOUS
Status: CANCELLED | OUTPATIENT
Start: 2021-05-12

## 2021-04-30 RX ORDER — INSULIN ASPART 100 [IU]/ML
16 INJECTION, SOLUTION INTRAVENOUS; SUBCUTANEOUS
Status: DISCONTINUED | OUTPATIENT
Start: 2021-04-30 | End: 2021-05-02

## 2021-04-30 RX ORDER — SODIUM CHLORIDE 0.9 % (FLUSH) 0.9 %
10 SYRINGE (ML) INJECTION
Status: CANCELLED | OUTPATIENT
Start: 2021-05-12

## 2021-04-30 RX ORDER — SODIUM CHLORIDE 0.9 % (FLUSH) 0.9 %
10 SYRINGE (ML) INJECTION
Status: DISCONTINUED | OUTPATIENT
Start: 2021-04-30 | End: 2021-05-11 | Stop reason: HOSPADM

## 2021-04-30 RX ORDER — HEPARIN SODIUM 5000 [USP'U]/ML
5000 INJECTION, SOLUTION INTRAVENOUS; SUBCUTANEOUS EVERY 12 HOURS
Status: DISCONTINUED | OUTPATIENT
Start: 2021-04-30 | End: 2021-05-06

## 2021-04-30 RX ORDER — INSULIN ASPART 100 [IU]/ML
16 INJECTION, SOLUTION INTRAVENOUS; SUBCUTANEOUS
Status: DISCONTINUED | OUTPATIENT
Start: 2021-04-30 | End: 2021-04-30

## 2021-04-30 RX ORDER — HEPARIN 100 UNIT/ML
500 SYRINGE INTRAVENOUS
Status: DISCONTINUED | OUTPATIENT
Start: 2021-04-30 | End: 2021-05-11 | Stop reason: HOSPADM

## 2021-04-30 RX ADMIN — INSULIN ASPART 4 UNITS: 100 INJECTION, SOLUTION INTRAVENOUS; SUBCUTANEOUS at 05:04

## 2021-04-30 RX ADMIN — METHYLPREDNISOLONE SODIUM SUCCINATE 125 MG: 125 INJECTION, POWDER, FOR SOLUTION INTRAMUSCULAR; INTRAVENOUS at 03:04

## 2021-04-30 RX ADMIN — MAGNESIUM 64 MG (MAGNESIUM CHLORIDE) TABLET,DELAYED RELEASE 128 MG: at 08:04

## 2021-04-30 RX ADMIN — RITUXIMAB 870 MG: 10 INJECTION, SOLUTION INTRAVENOUS at 04:04

## 2021-04-30 RX ADMIN — NIFEDIPINE 30 MG: 30 TABLET, FILM COATED, EXTENDED RELEASE ORAL at 08:04

## 2021-04-30 RX ADMIN — ROSUVASTATIN CALCIUM 10 MG: 10 TABLET, FILM COATED ORAL at 08:04

## 2021-04-30 RX ADMIN — INSULIN ASPART 14 UNITS: 100 INJECTION, SOLUTION INTRAVENOUS; SUBCUTANEOUS at 08:04

## 2021-04-30 RX ADMIN — AZITHROMYCIN MONOHYDRATE 250 MG: 250 TABLET ORAL at 05:04

## 2021-04-30 RX ADMIN — TACROLIMUS 2 MG: 1 CAPSULE ORAL at 08:04

## 2021-04-30 RX ADMIN — SODIUM BICARBONATE 650 MG TABLET 1300 MG: at 08:04

## 2021-04-30 RX ADMIN — SODIUM CHLORIDE: 0.9 INJECTION, SOLUTION INTRAVENOUS at 12:04

## 2021-04-30 RX ADMIN — ACETAMINOPHEN 650 MG: 325 TABLET ORAL at 03:04

## 2021-04-30 RX ADMIN — MUPIROCIN: 20 OINTMENT TOPICAL at 08:04

## 2021-04-30 RX ADMIN — TACROLIMUS 1.5 MG: 1 CAPSULE ORAL at 05:04

## 2021-04-30 RX ADMIN — CLONIDINE HYDROCHLORIDE 0.1 MG: 0.1 TABLET ORAL at 08:04

## 2021-04-30 RX ADMIN — ASPIRIN 81 MG: 81 TABLET, COATED ORAL at 08:04

## 2021-04-30 RX ADMIN — INSULIN ASPART 3 UNITS: 100 INJECTION, SOLUTION INTRAVENOUS; SUBCUTANEOUS at 12:04

## 2021-04-30 RX ADMIN — INSULIN ASPART 16 UNITS: 100 INJECTION, SOLUTION INTRAVENOUS; SUBCUTANEOUS at 01:04

## 2021-04-30 RX ADMIN — INSULIN ASPART 2 UNITS: 100 INJECTION, SOLUTION INTRAVENOUS; SUBCUTANEOUS at 08:04

## 2021-04-30 RX ADMIN — CLONIDINE HYDROCHLORIDE 0.1 MG: 0.1 TABLET ORAL at 03:04

## 2021-04-30 RX ADMIN — FAMOTIDINE 20 MG: 20 TABLET ORAL at 08:04

## 2021-04-30 RX ADMIN — INSULIN ASPART 3 UNITS: 100 INJECTION, SOLUTION INTRAVENOUS; SUBCUTANEOUS at 09:04

## 2021-04-30 RX ADMIN — INSULIN ASPART 16 UNITS: 100 INJECTION, SOLUTION INTRAVENOUS; SUBCUTANEOUS at 05:04

## 2021-04-30 RX ADMIN — HEPARIN SODIUM 5000 UNITS: 5000 INJECTION INTRAVENOUS; SUBCUTANEOUS at 08:04

## 2021-04-30 RX ADMIN — SODIUM ZIRCONIUM CYCLOSILICATE 10 G: 10 POWDER, FOR SUSPENSION ORAL at 08:04

## 2021-04-30 RX ADMIN — DIPHENHYDRAMINE HYDROCHLORIDE 50 MG: 50 CAPSULE ORAL at 03:04

## 2021-05-01 PROBLEM — R54 FRAILTY: Status: ACTIVE | Noted: 2021-05-01

## 2021-05-01 PROBLEM — N18.9 ANEMIA DUE TO CHRONIC KIDNEY DISEASE: Status: ACTIVE | Noted: 2021-05-01

## 2021-05-01 PROBLEM — E08.9 DIABETES MELLITUS DUE TO UNDERLYING CONDITION: Status: ACTIVE | Noted: 2021-05-01

## 2021-05-01 PROBLEM — D63.1 ANEMIA DUE TO CHRONIC KIDNEY DISEASE: Status: ACTIVE | Noted: 2021-05-01

## 2021-05-01 PROBLEM — R60.0 EXTREMITY EDEMA: Status: ACTIVE | Noted: 2021-05-01

## 2021-05-01 LAB
ALBUMIN SERPL BCP-MCNC: 3.4 G/DL (ref 3.5–5.2)
ALP SERPL-CCNC: 23 U/L (ref 55–135)
ALT SERPL W/O P-5'-P-CCNC: 9 U/L (ref 10–44)
ANION GAP SERPL CALC-SCNC: 12 MMOL/L (ref 8–16)
AST SERPL-CCNC: 14 U/L (ref 10–40)
BASOPHILS # BLD AUTO: 0 K/UL (ref 0–0.2)
BASOPHILS NFR BLD: 0 % (ref 0–1.9)
BILIRUB SERPL-MCNC: 0.3 MG/DL (ref 0.1–1)
BUN SERPL-MCNC: 103 MG/DL (ref 6–20)
CALCIUM SERPL-MCNC: 7.1 MG/DL (ref 8.7–10.5)
CHLORIDE SERPL-SCNC: 102 MMOL/L (ref 95–110)
CO2 SERPL-SCNC: 19 MMOL/L (ref 23–29)
CREAT SERPL-MCNC: 10.6 MG/DL (ref 0.5–1.4)
DIFFERENTIAL METHOD: ABNORMAL
EOSINOPHIL # BLD AUTO: 0 K/UL (ref 0–0.5)
EOSINOPHIL NFR BLD: 0 % (ref 0–8)
ERYTHROCYTE [DISTWIDTH] IN BLOOD BY AUTOMATED COUNT: 13.2 % (ref 11.5–14.5)
EST. GFR  (AFRICAN AMERICAN): 5.5 ML/MIN/1.73 M^2
EST. GFR  (NON AFRICAN AMERICAN): 4.8 ML/MIN/1.73 M^2
FERRITIN SERPL-MCNC: 136 NG/ML (ref 20–300)
FOLATE SERPL-MCNC: 6.8 NG/ML (ref 4–24)
GLUCOSE SERPL-MCNC: 220 MG/DL (ref 70–110)
HCT VFR BLD AUTO: 22.6 % (ref 40–54)
HGB BLD-MCNC: 7.5 G/DL (ref 14–18)
IMM GRANULOCYTES # BLD AUTO: 0.11 K/UL (ref 0–0.04)
IMM GRANULOCYTES NFR BLD AUTO: 1.1 % (ref 0–0.5)
IRON SERPL-MCNC: 69 UG/DL (ref 45–160)
LYMPHOCYTES # BLD AUTO: 0.5 K/UL (ref 1–4.8)
LYMPHOCYTES NFR BLD: 5.2 % (ref 18–48)
MAGNESIUM SERPL-MCNC: 1.9 MG/DL (ref 1.6–2.6)
MCH RBC QN AUTO: 28.7 PG (ref 27–31)
MCHC RBC AUTO-ENTMCNC: 33.2 G/DL (ref 32–36)
MCV RBC AUTO: 87 FL (ref 82–98)
MONOCYTES # BLD AUTO: 0.4 K/UL (ref 0.3–1)
MONOCYTES NFR BLD: 3.8 % (ref 4–15)
NEUTROPHILS # BLD AUTO: 8.9 K/UL (ref 1.8–7.7)
NEUTROPHILS NFR BLD: 89.9 % (ref 38–73)
NRBC BLD-RTO: 0 /100 WBC
PLATELET # BLD AUTO: 252 K/UL (ref 150–450)
PMV BLD AUTO: 9.8 FL (ref 9.2–12.9)
POCT GLUCOSE: 185 MG/DL (ref 70–110)
POCT GLUCOSE: 301 MG/DL (ref 70–110)
POTASSIUM SERPL-SCNC: 4.2 MMOL/L (ref 3.5–5.1)
PROT SERPL-MCNC: 5.7 G/DL (ref 6–8.4)
RBC # BLD AUTO: 2.61 M/UL (ref 4.6–6.2)
RETICS/RBC NFR AUTO: 1.2 % (ref 0.4–2)
SATURATED IRON: 45 % (ref 20–50)
SODIUM SERPL-SCNC: 133 MMOL/L (ref 136–145)
TACROLIMUS BLD-MCNC: 3.3 NG/ML (ref 5–15)
TOTAL IRON BINDING CAPACITY: 152 UG/DL (ref 250–450)
TRANSFERRIN SERPL-MCNC: 103 MG/DL (ref 200–375)
TRANSFERRIN SERPL-MCNC: 103 MG/DL (ref 200–375)
VIT B12 SERPL-MCNC: 1092 PG/ML (ref 210–950)
WBC # BLD AUTO: 9.86 K/UL (ref 3.9–12.7)

## 2021-05-01 PROCEDURE — 99233 SBSQ HOSP IP/OBS HIGH 50: CPT | Mod: ,,, | Performed by: INTERNAL MEDICINE

## 2021-05-01 PROCEDURE — 83540 ASSAY OF IRON: CPT | Performed by: INTERNAL MEDICINE

## 2021-05-01 PROCEDURE — 85025 COMPLETE CBC W/AUTO DIFF WBC: CPT | Performed by: INTERNAL MEDICINE

## 2021-05-01 PROCEDURE — 63600175 PHARM REV CODE 636 W HCPCS: Performed by: INTERNAL MEDICINE

## 2021-05-01 PROCEDURE — 97161 PT EVAL LOW COMPLEX 20 MIN: CPT

## 2021-05-01 PROCEDURE — 80197 ASSAY OF TACROLIMUS: CPT | Performed by: PHYSICIAN ASSISTANT

## 2021-05-01 PROCEDURE — 80053 COMPREHEN METABOLIC PANEL: CPT | Performed by: INTERNAL MEDICINE

## 2021-05-01 PROCEDURE — 25000003 PHARM REV CODE 250: Performed by: PHYSICIAN ASSISTANT

## 2021-05-01 PROCEDURE — 25000003 PHARM REV CODE 250: Performed by: INTERNAL MEDICINE

## 2021-05-01 PROCEDURE — 94761 N-INVAS EAR/PLS OXIMETRY MLT: CPT

## 2021-05-01 PROCEDURE — 99233 PR SUBSEQUENT HOSPITAL CARE,LEVL III: ICD-10-PCS | Mod: ,,, | Performed by: INTERNAL MEDICINE

## 2021-05-01 PROCEDURE — 82728 ASSAY OF FERRITIN: CPT | Performed by: INTERNAL MEDICINE

## 2021-05-01 PROCEDURE — 63600175 PHARM REV CODE 636 W HCPCS: Performed by: STUDENT IN AN ORGANIZED HEALTH CARE EDUCATION/TRAINING PROGRAM

## 2021-05-01 PROCEDURE — 27000221 HC OXYGEN, UP TO 24 HOURS

## 2021-05-01 PROCEDURE — 82746 ASSAY OF FOLIC ACID SERUM: CPT | Performed by: INTERNAL MEDICINE

## 2021-05-01 PROCEDURE — 85045 AUTOMATED RETICULOCYTE COUNT: CPT | Performed by: INTERNAL MEDICINE

## 2021-05-01 PROCEDURE — 20600001 HC STEP DOWN PRIVATE ROOM

## 2021-05-01 PROCEDURE — 63600175 PHARM REV CODE 636 W HCPCS: Performed by: PHYSICIAN ASSISTANT

## 2021-05-01 PROCEDURE — 83735 ASSAY OF MAGNESIUM: CPT | Performed by: INTERNAL MEDICINE

## 2021-05-01 PROCEDURE — 82607 VITAMIN B-12: CPT | Performed by: INTERNAL MEDICINE

## 2021-05-01 RX ORDER — NIFEDIPINE 30 MG/1
60 TABLET, EXTENDED RELEASE ORAL DAILY
Status: DISCONTINUED | OUTPATIENT
Start: 2021-05-02 | End: 2021-05-11 | Stop reason: HOSPADM

## 2021-05-01 RX ORDER — ADHESIVE BANDAGE
30 BANDAGE TOPICAL DAILY
Status: COMPLETED | OUTPATIENT
Start: 2021-05-01 | End: 2021-05-02

## 2021-05-01 RX ORDER — DAPSONE 100 MG/1
100 TABLET ORAL DAILY
Status: DISCONTINUED | OUTPATIENT
Start: 2021-05-01 | End: 2021-05-03

## 2021-05-01 RX ORDER — PREDNISONE 5 MG/1
5 TABLET ORAL DAILY
Status: DISCONTINUED | OUTPATIENT
Start: 2021-05-01 | End: 2021-05-11 | Stop reason: HOSPADM

## 2021-05-01 RX ORDER — NIFEDIPINE 30 MG/1
30 TABLET, EXTENDED RELEASE ORAL ONCE
Status: COMPLETED | OUTPATIENT
Start: 2021-05-01 | End: 2021-05-01

## 2021-05-01 RX ORDER — POLYETHYLENE GLYCOL 3350 17 G/17G
17 POWDER, FOR SOLUTION ORAL DAILY
Status: DISCONTINUED | OUTPATIENT
Start: 2021-05-01 | End: 2021-05-11 | Stop reason: HOSPADM

## 2021-05-01 RX ORDER — SENNOSIDES 8.6 MG/1
17.2 TABLET ORAL NIGHTLY
Status: DISCONTINUED | OUTPATIENT
Start: 2021-05-01 | End: 2021-05-11 | Stop reason: HOSPADM

## 2021-05-01 RX ORDER — DAPSONE 25 MG/1
50 TABLET ORAL DAILY
Status: DISCONTINUED | OUTPATIENT
Start: 2021-05-01 | End: 2021-05-01

## 2021-05-01 RX ADMIN — CLONIDINE HYDROCHLORIDE 0.1 MG: 0.1 TABLET ORAL at 08:05

## 2021-05-01 RX ADMIN — TACROLIMUS 2 MG: 1 CAPSULE ORAL at 08:05

## 2021-05-01 RX ADMIN — INSULIN ASPART 4 UNITS: 100 INJECTION, SOLUTION INTRAVENOUS; SUBCUTANEOUS at 05:05

## 2021-05-01 RX ADMIN — NIFEDIPINE 30 MG: 30 TABLET, FILM COATED, EXTENDED RELEASE ORAL at 11:05

## 2021-05-01 RX ADMIN — FAMOTIDINE 20 MG: 20 TABLET ORAL at 08:05

## 2021-05-01 RX ADMIN — INSULIN ASPART 2 UNITS: 100 INJECTION, SOLUTION INTRAVENOUS; SUBCUTANEOUS at 09:05

## 2021-05-01 RX ADMIN — CLONIDINE HYDROCHLORIDE 0.1 MG: 0.1 TABLET ORAL at 03:05

## 2021-05-01 RX ADMIN — SENNOSIDES 17.2 MG: 8.6 TABLET, FILM COATED ORAL at 09:05

## 2021-05-01 RX ADMIN — SODIUM BICARBONATE 650 MG TABLET 1300 MG: at 08:05

## 2021-05-01 RX ADMIN — ROSUVASTATIN CALCIUM 10 MG: 10 TABLET, FILM COATED ORAL at 08:05

## 2021-05-01 RX ADMIN — MAGNESIUM HYDROXIDE 2400 MG: 400 SUSPENSION ORAL at 12:05

## 2021-05-01 RX ADMIN — HEPARIN SODIUM 5000 UNITS: 5000 INJECTION INTRAVENOUS; SUBCUTANEOUS at 08:05

## 2021-05-01 RX ADMIN — HEPARIN SODIUM 5000 UNITS: 5000 INJECTION INTRAVENOUS; SUBCUTANEOUS at 09:05

## 2021-05-01 RX ADMIN — TACROLIMUS 1.5 MG: 1 CAPSULE ORAL at 05:05

## 2021-05-01 RX ADMIN — MAGNESIUM 64 MG (MAGNESIUM CHLORIDE) TABLET,DELAYED RELEASE 128 MG: at 09:05

## 2021-05-01 RX ADMIN — CLONIDINE HYDROCHLORIDE 0.1 MG: 0.1 TABLET ORAL at 09:05

## 2021-05-01 RX ADMIN — SODIUM BICARBONATE 650 MG TABLET 1300 MG: at 09:05

## 2021-05-01 RX ADMIN — SODIUM ZIRCONIUM CYCLOSILICATE 10 G: 10 POWDER, FOR SUSPENSION ORAL at 08:05

## 2021-05-01 RX ADMIN — DAPSONE 100 MG: 100 TABLET ORAL at 12:05

## 2021-05-01 RX ADMIN — MAGNESIUM 64 MG (MAGNESIUM CHLORIDE) TABLET,DELAYED RELEASE 128 MG: at 08:05

## 2021-05-01 RX ADMIN — MUPIROCIN: 20 OINTMENT TOPICAL at 11:05

## 2021-05-01 RX ADMIN — ASPIRIN 81 MG: 81 TABLET, COATED ORAL at 08:05

## 2021-05-01 RX ADMIN — NIFEDIPINE 30 MG: 30 TABLET, FILM COATED, EXTENDED RELEASE ORAL at 08:05

## 2021-05-01 RX ADMIN — PREDNISONE 5 MG: 5 TABLET ORAL at 11:05

## 2021-05-01 RX ADMIN — INSULIN ASPART 16 UNITS: 100 INJECTION, SOLUTION INTRAVENOUS; SUBCUTANEOUS at 05:05

## 2021-05-01 RX ADMIN — INSULIN ASPART 16 UNITS: 100 INJECTION, SOLUTION INTRAVENOUS; SUBCUTANEOUS at 09:05

## 2021-05-02 PROBLEM — J96.01 ACUTE HYPOXEMIC RESPIRATORY FAILURE: Status: ACTIVE | Noted: 2021-05-02

## 2021-05-02 PROBLEM — E87.5 HYPERKALEMIA: Status: RESOLVED | Noted: 2017-08-27 | Resolved: 2021-05-02

## 2021-05-02 LAB
ALBUMIN SERPL BCP-MCNC: 3.5 G/DL (ref 3.5–5.2)
ALP SERPL-CCNC: 29 U/L (ref 55–135)
ALT SERPL W/O P-5'-P-CCNC: 137 U/L (ref 10–44)
ANION GAP SERPL CALC-SCNC: 15 MMOL/L (ref 8–16)
AST SERPL-CCNC: 110 U/L (ref 10–40)
BASOPHILS # BLD AUTO: 0 K/UL (ref 0–0.2)
BASOPHILS NFR BLD: 0 % (ref 0–1.9)
BILIRUB SERPL-MCNC: 0.5 MG/DL (ref 0.1–1)
BUN SERPL-MCNC: 122 MG/DL (ref 6–20)
CALCIUM SERPL-MCNC: 7 MG/DL (ref 8.7–10.5)
CHLORIDE SERPL-SCNC: 103 MMOL/L (ref 95–110)
CO2 SERPL-SCNC: 19 MMOL/L (ref 23–29)
CREAT SERPL-MCNC: 11.9 MG/DL (ref 0.5–1.4)
DIFFERENTIAL METHOD: ABNORMAL
EOSINOPHIL # BLD AUTO: 0 K/UL (ref 0–0.5)
EOSINOPHIL NFR BLD: 0.2 % (ref 0–8)
ERYTHROCYTE [DISTWIDTH] IN BLOOD BY AUTOMATED COUNT: 12.9 % (ref 11.5–14.5)
EST. GFR  (AFRICAN AMERICAN): 4.8 ML/MIN/1.73 M^2
EST. GFR  (NON AFRICAN AMERICAN): 4.1 ML/MIN/1.73 M^2
GLUCOSE SERPL-MCNC: 148 MG/DL (ref 70–110)
HCT VFR BLD AUTO: 24.2 % (ref 40–54)
HGB BLD-MCNC: 8 G/DL (ref 14–18)
IMM GRANULOCYTES # BLD AUTO: 0.11 K/UL (ref 0–0.04)
IMM GRANULOCYTES NFR BLD AUTO: 1.1 % (ref 0–0.5)
LYMPHOCYTES # BLD AUTO: 1.1 K/UL (ref 1–4.8)
LYMPHOCYTES NFR BLD: 11.4 % (ref 18–48)
MAGNESIUM SERPL-MCNC: 2.3 MG/DL (ref 1.6–2.6)
MCH RBC QN AUTO: 29.4 PG (ref 27–31)
MCHC RBC AUTO-ENTMCNC: 33.1 G/DL (ref 32–36)
MCV RBC AUTO: 89 FL (ref 82–98)
MONOCYTES # BLD AUTO: 1.2 K/UL (ref 0.3–1)
MONOCYTES NFR BLD: 12 % (ref 4–15)
NEUTROPHILS # BLD AUTO: 7.5 K/UL (ref 1.8–7.7)
NEUTROPHILS NFR BLD: 75.3 % (ref 38–73)
NRBC BLD-RTO: 0 /100 WBC
PLATELET # BLD AUTO: 245 K/UL (ref 150–450)
PMV BLD AUTO: 9.4 FL (ref 9.2–12.9)
POCT GLUCOSE: 163 MG/DL (ref 70–110)
POCT GLUCOSE: 163 MG/DL (ref 70–110)
POCT GLUCOSE: 303 MG/DL (ref 70–110)
POCT GLUCOSE: 77 MG/DL (ref 70–110)
POCT GLUCOSE: 77 MG/DL (ref 70–110)
POCT GLUCOSE: 96 MG/DL (ref 70–110)
POTASSIUM SERPL-SCNC: 3.9 MMOL/L (ref 3.5–5.1)
PROT SERPL-MCNC: 5.7 G/DL (ref 6–8.4)
RBC # BLD AUTO: 2.72 M/UL (ref 4.6–6.2)
SODIUM SERPL-SCNC: 137 MMOL/L (ref 136–145)
TACROLIMUS BLD-MCNC: 5.5 NG/ML (ref 5–15)
WBC # BLD AUTO: 9.97 K/UL (ref 3.9–12.7)

## 2021-05-02 PROCEDURE — 20600001 HC STEP DOWN PRIVATE ROOM

## 2021-05-02 PROCEDURE — C9399 UNCLASSIFIED DRUGS OR BIOLOG: HCPCS | Performed by: NURSE PRACTITIONER

## 2021-05-02 PROCEDURE — 80197 ASSAY OF TACROLIMUS: CPT | Performed by: PHYSICIAN ASSISTANT

## 2021-05-02 PROCEDURE — 85025 COMPLETE CBC W/AUTO DIFF WBC: CPT | Performed by: INTERNAL MEDICINE

## 2021-05-02 PROCEDURE — 63600175 PHARM REV CODE 636 W HCPCS: Performed by: STUDENT IN AN ORGANIZED HEALTH CARE EDUCATION/TRAINING PROGRAM

## 2021-05-02 PROCEDURE — 80053 COMPREHEN METABOLIC PANEL: CPT | Performed by: INTERNAL MEDICINE

## 2021-05-02 PROCEDURE — 25000003 PHARM REV CODE 250: Performed by: PHYSICIAN ASSISTANT

## 2021-05-02 PROCEDURE — 99233 PR SUBSEQUENT HOSPITAL CARE,LEVL III: ICD-10-PCS | Mod: ,,, | Performed by: INTERNAL MEDICINE

## 2021-05-02 PROCEDURE — 63600175 PHARM REV CODE 636 W HCPCS: Performed by: PHYSICIAN ASSISTANT

## 2021-05-02 PROCEDURE — 63600175 PHARM REV CODE 636 W HCPCS: Performed by: INTERNAL MEDICINE

## 2021-05-02 PROCEDURE — 25000003 PHARM REV CODE 250: Performed by: NURSE PRACTITIONER

## 2021-05-02 PROCEDURE — 99233 SBSQ HOSP IP/OBS HIGH 50: CPT | Mod: ,,, | Performed by: INTERNAL MEDICINE

## 2021-05-02 PROCEDURE — 83735 ASSAY OF MAGNESIUM: CPT | Performed by: INTERNAL MEDICINE

## 2021-05-02 PROCEDURE — 99232 PR SUBSEQUENT HOSPITAL CARE,LEVL II: ICD-10-PCS | Mod: ,,, | Performed by: NURSE PRACTITIONER

## 2021-05-02 PROCEDURE — 99232 SBSQ HOSP IP/OBS MODERATE 35: CPT | Mod: ,,, | Performed by: NURSE PRACTITIONER

## 2021-05-02 PROCEDURE — 25000003 PHARM REV CODE 250: Performed by: INTERNAL MEDICINE

## 2021-05-02 RX ORDER — INSULIN ASPART 100 [IU]/ML
8 INJECTION, SOLUTION INTRAVENOUS; SUBCUTANEOUS
Status: DISCONTINUED | OUTPATIENT
Start: 2021-05-03 | End: 2021-05-04

## 2021-05-02 RX ADMIN — CLONIDINE HYDROCHLORIDE 0.1 MG: 0.1 TABLET ORAL at 09:05

## 2021-05-02 RX ADMIN — SODIUM BICARBONATE 650 MG TABLET 1300 MG: at 09:05

## 2021-05-02 RX ADMIN — TACROLIMUS 2 MG: 1 CAPSULE ORAL at 09:05

## 2021-05-02 RX ADMIN — MAGNESIUM HYDROXIDE 2400 MG: 400 SUSPENSION ORAL at 03:05

## 2021-05-02 RX ADMIN — FAMOTIDINE 20 MG: 20 TABLET ORAL at 09:05

## 2021-05-02 RX ADMIN — SENNOSIDES 17.2 MG: 8.6 TABLET, FILM COATED ORAL at 09:05

## 2021-05-02 RX ADMIN — HEPARIN SODIUM 5000 UNITS: 5000 INJECTION INTRAVENOUS; SUBCUTANEOUS at 09:05

## 2021-05-02 RX ADMIN — ROSUVASTATIN CALCIUM 10 MG: 10 TABLET, FILM COATED ORAL at 09:05

## 2021-05-02 RX ADMIN — MAGNESIUM 64 MG (MAGNESIUM CHLORIDE) TABLET,DELAYED RELEASE 128 MG: at 09:05

## 2021-05-02 RX ADMIN — INSULIN ASPART 8 UNITS: 100 INJECTION, SOLUTION INTRAVENOUS; SUBCUTANEOUS at 06:05

## 2021-05-02 RX ADMIN — INSULIN ASPART 8 UNITS: 100 INJECTION, SOLUTION INTRAVENOUS; SUBCUTANEOUS at 12:05

## 2021-05-02 RX ADMIN — PREDNISONE 5 MG: 5 TABLET ORAL at 09:05

## 2021-05-02 RX ADMIN — INSULIN ASPART 16 UNITS: 100 INJECTION, SOLUTION INTRAVENOUS; SUBCUTANEOUS at 09:05

## 2021-05-02 RX ADMIN — CLONIDINE HYDROCHLORIDE 0.1 MG: 0.1 TABLET ORAL at 03:05

## 2021-05-02 RX ADMIN — DOCUSATE SODIUM 50 MG: 50 CAPSULE, LIQUID FILLED ORAL at 09:05

## 2021-05-02 RX ADMIN — NIFEDIPINE 60 MG: 30 TABLET, FILM COATED, EXTENDED RELEASE ORAL at 09:05

## 2021-05-02 RX ADMIN — INSULIN DETEMIR 24 UNITS: 100 INJECTION, SOLUTION SUBCUTANEOUS at 09:05

## 2021-05-02 RX ADMIN — ASPIRIN 81 MG: 81 TABLET, COATED ORAL at 09:05

## 2021-05-02 RX ADMIN — TACROLIMUS 1.5 MG: 1 CAPSULE ORAL at 05:05

## 2021-05-02 RX ADMIN — DOCUSATE SODIUM 50 MG: 50 CAPSULE, LIQUID FILLED ORAL at 03:05

## 2021-05-02 RX ADMIN — DAPSONE 100 MG: 100 TABLET ORAL at 09:05

## 2021-05-03 PROBLEM — R74.01 TRANSAMINITIS: Status: ACTIVE | Noted: 2021-05-03

## 2021-05-03 PROBLEM — E08.9 DIABETES MELLITUS DUE TO UNDERLYING CONDITION: Status: RESOLVED | Noted: 2021-05-01 | Resolved: 2021-05-03

## 2021-05-03 LAB
ALBUMIN SERPL BCP-MCNC: 3.4 G/DL (ref 3.5–5.2)
ALP SERPL-CCNC: 60 U/L (ref 55–135)
ALT SERPL W/O P-5'-P-CCNC: 814 U/L (ref 10–44)
ANION GAP SERPL CALC-SCNC: 18 MMOL/L (ref 8–16)
AST SERPL-CCNC: 673 U/L (ref 10–40)
BASOPHILS # BLD AUTO: 0.01 K/UL (ref 0–0.2)
BASOPHILS NFR BLD: 0.1 % (ref 0–1.9)
BILIRUB SERPL-MCNC: 0.5 MG/DL (ref 0.1–1)
BNP SERPL-MCNC: 391 PG/ML (ref 0–99)
BUN SERPL-MCNC: 138 MG/DL (ref 6–20)
CALCIUM SERPL-MCNC: 7.1 MG/DL (ref 8.7–10.5)
CHLORIDE SERPL-SCNC: 102 MMOL/L (ref 95–110)
CO2 SERPL-SCNC: 16 MMOL/L (ref 23–29)
CREAT SERPL-MCNC: 13 MG/DL (ref 0.5–1.4)
DIFFERENTIAL METHOD: ABNORMAL
EOSINOPHIL # BLD AUTO: 0.2 K/UL (ref 0–0.5)
EOSINOPHIL NFR BLD: 2.4 % (ref 0–8)
ERYTHROCYTE [DISTWIDTH] IN BLOOD BY AUTOMATED COUNT: 12.9 % (ref 11.5–14.5)
EST. GFR  (AFRICAN AMERICAN): 4.3 ML/MIN/1.73 M^2
EST. GFR  (NON AFRICAN AMERICAN): 3.7 ML/MIN/1.73 M^2
GLUCOSE SERPL-MCNC: 132 MG/DL (ref 70–110)
HCT VFR BLD AUTO: 25.2 % (ref 40–54)
HGB BLD-MCNC: 8.1 G/DL (ref 14–18)
IMM GRANULOCYTES # BLD AUTO: 0.13 K/UL (ref 0–0.04)
IMM GRANULOCYTES NFR BLD AUTO: 1.4 % (ref 0–0.5)
LYMPHOCYTES # BLD AUTO: 1.2 K/UL (ref 1–4.8)
LYMPHOCYTES NFR BLD: 12.9 % (ref 18–48)
MAGNESIUM SERPL-MCNC: 2.6 MG/DL (ref 1.6–2.6)
MCH RBC QN AUTO: 28.2 PG (ref 27–31)
MCHC RBC AUTO-ENTMCNC: 32.1 G/DL (ref 32–36)
MCV RBC AUTO: 88 FL (ref 82–98)
MONOCYTES # BLD AUTO: 1.2 K/UL (ref 0.3–1)
MONOCYTES NFR BLD: 13 % (ref 4–15)
NEUTROPHILS # BLD AUTO: 6.4 K/UL (ref 1.8–7.7)
NEUTROPHILS NFR BLD: 70.2 % (ref 38–73)
NRBC BLD-RTO: 0 /100 WBC
PLATELET # BLD AUTO: 236 K/UL (ref 150–450)
PMV BLD AUTO: 8.9 FL (ref 9.2–12.9)
POCT GLUCOSE: 116 MG/DL (ref 70–110)
POCT GLUCOSE: 117 MG/DL (ref 70–110)
POCT GLUCOSE: 153 MG/DL (ref 70–110)
POTASSIUM SERPL-SCNC: 4.3 MMOL/L (ref 3.5–5.1)
PROT SERPL-MCNC: 5.8 G/DL (ref 6–8.4)
RBC # BLD AUTO: 2.87 M/UL (ref 4.6–6.2)
SODIUM SERPL-SCNC: 136 MMOL/L (ref 136–145)
TACROLIMUS BLD-MCNC: 5.4 NG/ML (ref 5–15)
WBC # BLD AUTO: 9.1 K/UL (ref 3.9–12.7)

## 2021-05-03 PROCEDURE — 63600175 PHARM REV CODE 636 W HCPCS: Performed by: STUDENT IN AN ORGANIZED HEALTH CARE EDUCATION/TRAINING PROGRAM

## 2021-05-03 PROCEDURE — 99233 SBSQ HOSP IP/OBS HIGH 50: CPT | Mod: ,,, | Performed by: INTERNAL MEDICINE

## 2021-05-03 PROCEDURE — 63600175 PHARM REV CODE 636 W HCPCS: Performed by: PHYSICIAN ASSISTANT

## 2021-05-03 PROCEDURE — 63600175 PHARM REV CODE 636 W HCPCS: Performed by: INTERNAL MEDICINE

## 2021-05-03 PROCEDURE — 83735 ASSAY OF MAGNESIUM: CPT | Performed by: INTERNAL MEDICINE

## 2021-05-03 PROCEDURE — 20600001 HC STEP DOWN PRIVATE ROOM

## 2021-05-03 PROCEDURE — 80197 ASSAY OF TACROLIMUS: CPT | Performed by: PHYSICIAN ASSISTANT

## 2021-05-03 PROCEDURE — 63700000 PHARM REV CODE 250 ALT 637 W/O HCPCS: Performed by: PHYSICIAN ASSISTANT

## 2021-05-03 PROCEDURE — 80053 COMPREHEN METABOLIC PANEL: CPT | Performed by: INTERNAL MEDICINE

## 2021-05-03 PROCEDURE — 83880 ASSAY OF NATRIURETIC PEPTIDE: CPT | Performed by: INTERNAL MEDICINE

## 2021-05-03 PROCEDURE — 87340 HEPATITIS B SURFACE AG IA: CPT | Performed by: PHYSICIAN ASSISTANT

## 2021-05-03 PROCEDURE — 85025 COMPLETE CBC W/AUTO DIFF WBC: CPT | Performed by: INTERNAL MEDICINE

## 2021-05-03 PROCEDURE — 99233 PR SUBSEQUENT HOSPITAL CARE,LEVL III: ICD-10-PCS | Mod: ,,, | Performed by: INTERNAL MEDICINE

## 2021-05-03 PROCEDURE — 86709 HEPATITIS A IGM ANTIBODY: CPT | Performed by: PHYSICIAN ASSISTANT

## 2021-05-03 PROCEDURE — 90935 HEMODIALYSIS ONE EVALUATION: CPT

## 2021-05-03 PROCEDURE — 25000003 PHARM REV CODE 250: Performed by: PHYSICIAN ASSISTANT

## 2021-05-03 PROCEDURE — 86706 HEP B SURFACE ANTIBODY: CPT | Performed by: PHYSICIAN ASSISTANT

## 2021-05-03 PROCEDURE — 25000003 PHARM REV CODE 250: Performed by: INTERNAL MEDICINE

## 2021-05-03 PROCEDURE — 86580 TB INTRADERMAL TEST: CPT | Performed by: PHYSICIAN ASSISTANT

## 2021-05-03 PROCEDURE — 30200315 PPD INTRADERMAL TEST REV CODE 302: Performed by: PHYSICIAN ASSISTANT

## 2021-05-03 PROCEDURE — 25000003 PHARM REV CODE 250: Performed by: GENERAL PRACTICE

## 2021-05-03 RX ORDER — SODIUM CHLORIDE 9 MG/ML
INJECTION, SOLUTION INTRAVENOUS ONCE
Status: COMPLETED | OUTPATIENT
Start: 2021-05-03 | End: 2021-05-03

## 2021-05-03 RX ADMIN — PREDNISONE 5 MG: 5 TABLET ORAL at 08:05

## 2021-05-03 RX ADMIN — CLONIDINE HYDROCHLORIDE 0.1 MG: 0.1 TABLET ORAL at 04:05

## 2021-05-03 RX ADMIN — DAPSONE 100 MG: 100 TABLET ORAL at 08:05

## 2021-05-03 RX ADMIN — CLONIDINE HYDROCHLORIDE 0.1 MG: 0.1 TABLET ORAL at 09:05

## 2021-05-03 RX ADMIN — SODIUM BICARBONATE 650 MG TABLET 1300 MG: at 09:05

## 2021-05-03 RX ADMIN — CLONIDINE HYDROCHLORIDE 0.1 MG: 0.1 TABLET ORAL at 08:05

## 2021-05-03 RX ADMIN — INSULIN ASPART 8 UNITS: 100 INJECTION, SOLUTION INTRAVENOUS; SUBCUTANEOUS at 04:05

## 2021-05-03 RX ADMIN — SODIUM BICARBONATE 650 MG TABLET 1300 MG: at 08:05

## 2021-05-03 RX ADMIN — HEPARIN SODIUM 5000 UNITS: 5000 INJECTION INTRAVENOUS; SUBCUTANEOUS at 09:05

## 2021-05-03 RX ADMIN — NIFEDIPINE 60 MG: 30 TABLET, FILM COATED, EXTENDED RELEASE ORAL at 08:05

## 2021-05-03 RX ADMIN — AZITHROMYCIN MONOHYDRATE 250 MG: 250 TABLET ORAL at 04:05

## 2021-05-03 RX ADMIN — ROSUVASTATIN CALCIUM 10 MG: 10 TABLET, FILM COATED ORAL at 08:05

## 2021-05-03 RX ADMIN — HEPARIN SODIUM 5000 UNITS: 5000 INJECTION INTRAVENOUS; SUBCUTANEOUS at 08:05

## 2021-05-03 RX ADMIN — MAGNESIUM 64 MG (MAGNESIUM CHLORIDE) TABLET,DELAYED RELEASE 128 MG: at 09:05

## 2021-05-03 RX ADMIN — INSULIN DETEMIR 24 UNITS: 100 INJECTION, SOLUTION SUBCUTANEOUS at 09:05

## 2021-05-03 RX ADMIN — INSULIN ASPART 8 UNITS: 100 INJECTION, SOLUTION INTRAVENOUS; SUBCUTANEOUS at 08:05

## 2021-05-03 RX ADMIN — TACROLIMUS 1.5 MG: 1 CAPSULE ORAL at 05:05

## 2021-05-03 RX ADMIN — TACROLIMUS 2 MG: 1 CAPSULE ORAL at 08:05

## 2021-05-03 RX ADMIN — TUBERCULIN PURIFIED PROTEIN DERIVATIVE 5 UNITS: 5 INJECTION, SOLUTION INTRADERMAL at 04:05

## 2021-05-03 RX ADMIN — SODIUM CHLORIDE: 0.9 INJECTION, SOLUTION INTRAVENOUS at 10:05

## 2021-05-03 RX ADMIN — MAGNESIUM 64 MG (MAGNESIUM CHLORIDE) TABLET,DELAYED RELEASE 128 MG: at 08:05

## 2021-05-03 RX ADMIN — FAMOTIDINE 20 MG: 20 TABLET ORAL at 09:05

## 2021-05-03 RX ADMIN — ASPIRIN 81 MG: 81 TABLET, COATED ORAL at 08:05

## 2021-05-04 PROBLEM — N17.9 AKI (ACUTE KIDNEY INJURY): Status: RESOLVED | Noted: 2021-01-11 | Resolved: 2021-05-04

## 2021-05-04 LAB
ALBUMIN SERPL BCP-MCNC: 3.3 G/DL (ref 3.5–5.2)
ALBUMIN SERPL BCP-MCNC: 3.6 G/DL (ref 3.5–5.2)
ALP SERPL-CCNC: 66 U/L (ref 55–135)
ALP SERPL-CCNC: 76 U/L (ref 55–135)
ALT SERPL W/O P-5'-P-CCNC: 550 U/L (ref 10–44)
ALT SERPL W/O P-5'-P-CCNC: 568 U/L (ref 10–44)
ANION GAP SERPL CALC-SCNC: 10 MMOL/L (ref 8–16)
ANION GAP SERPL CALC-SCNC: 15 MMOL/L (ref 8–16)
AST SERPL-CCNC: 214 U/L (ref 10–40)
AST SERPL-CCNC: 232 U/L (ref 10–40)
BASOPHILS # BLD AUTO: 0.01 K/UL (ref 0–0.2)
BASOPHILS NFR BLD: 0.1 % (ref 0–1.9)
BILIRUB SERPL-MCNC: 0.5 MG/DL (ref 0.1–1)
BILIRUB SERPL-MCNC: 0.6 MG/DL (ref 0.1–1)
BUN SERPL-MCNC: 92 MG/DL (ref 6–20)
BUN SERPL-MCNC: 97 MG/DL (ref 6–20)
CALCIUM SERPL-MCNC: 7.5 MG/DL (ref 8.7–10.5)
CALCIUM SERPL-MCNC: 7.5 MG/DL (ref 8.7–10.5)
CHLORIDE SERPL-SCNC: 97 MMOL/L (ref 95–110)
CHLORIDE SERPL-SCNC: 98 MMOL/L (ref 95–110)
CO2 SERPL-SCNC: 24 MMOL/L (ref 23–29)
CO2 SERPL-SCNC: 29 MMOL/L (ref 23–29)
CREAT SERPL-MCNC: 8.8 MG/DL (ref 0.5–1.4)
CREAT SERPL-MCNC: 9.2 MG/DL (ref 0.5–1.4)
DIFFERENTIAL METHOD: ABNORMAL
EOSINOPHIL # BLD AUTO: 0.2 K/UL (ref 0–0.5)
EOSINOPHIL NFR BLD: 1.7 % (ref 0–8)
ERYTHROCYTE [DISTWIDTH] IN BLOOD BY AUTOMATED COUNT: 12.7 % (ref 11.5–14.5)
EST. GFR  (AFRICAN AMERICAN): 6.5 ML/MIN/1.73 M^2
EST. GFR  (AFRICAN AMERICAN): 6.9 ML/MIN/1.73 M^2
EST. GFR  (NON AFRICAN AMERICAN): 5.6 ML/MIN/1.73 M^2
EST. GFR  (NON AFRICAN AMERICAN): 6 ML/MIN/1.73 M^2
GLUCOSE SERPL-MCNC: 156 MG/DL (ref 70–110)
GLUCOSE SERPL-MCNC: 168 MG/DL (ref 70–110)
HAV IGM SERPL QL IA: NEGATIVE
HAV IGM SERPL QL IA: NEGATIVE
HBV CORE IGM SERPL QL IA: NEGATIVE
HBV SURFACE AB SER-ACNC: POSITIVE M[IU]/ML
HBV SURFACE AG SERPL QL IA: NEGATIVE
HBV SURFACE AG SERPL QL IA: NEGATIVE
HCT VFR BLD AUTO: 25.7 % (ref 40–54)
HCV AB SERPL QL IA: NEGATIVE
HGB BLD-MCNC: 8.6 G/DL (ref 14–18)
IMM GRANULOCYTES # BLD AUTO: 0.16 K/UL (ref 0–0.04)
IMM GRANULOCYTES NFR BLD AUTO: 1.3 % (ref 0–0.5)
LYMPHOCYTES # BLD AUTO: 1.1 K/UL (ref 1–4.8)
LYMPHOCYTES NFR BLD: 9.1 % (ref 18–48)
MAGNESIUM SERPL-MCNC: 2.4 MG/DL (ref 1.6–2.6)
MCH RBC QN AUTO: 29 PG (ref 27–31)
MCHC RBC AUTO-ENTMCNC: 33.5 G/DL (ref 32–36)
MCV RBC AUTO: 87 FL (ref 82–98)
MONOCYTES # BLD AUTO: 0.4 K/UL (ref 0.3–1)
MONOCYTES NFR BLD: 3 % (ref 4–15)
NEUTROPHILS # BLD AUTO: 10.4 K/UL (ref 1.8–7.7)
NEUTROPHILS NFR BLD: 84.8 % (ref 38–73)
NRBC BLD-RTO: 0 /100 WBC
PLATELET # BLD AUTO: 248 K/UL (ref 150–450)
PMV BLD AUTO: 9.2 FL (ref 9.2–12.9)
POCT GLUCOSE: 184 MG/DL (ref 70–110)
POCT GLUCOSE: 193 MG/DL (ref 70–110)
POCT GLUCOSE: 193 MG/DL (ref 70–110)
POCT GLUCOSE: 219 MG/DL (ref 70–110)
POTASSIUM SERPL-SCNC: 3.8 MMOL/L (ref 3.5–5.1)
POTASSIUM SERPL-SCNC: 4.7 MMOL/L (ref 3.5–5.1)
PROT SERPL-MCNC: 5.7 G/DL (ref 6–8.4)
PROT SERPL-MCNC: 6.3 G/DL (ref 6–8.4)
RBC # BLD AUTO: 2.97 M/UL (ref 4.6–6.2)
SODIUM SERPL-SCNC: 136 MMOL/L (ref 136–145)
SODIUM SERPL-SCNC: 137 MMOL/L (ref 136–145)
TACROLIMUS BLD-MCNC: 6.1 NG/ML (ref 5–15)
WBC # BLD AUTO: 12.21 K/UL (ref 3.9–12.7)

## 2021-05-04 PROCEDURE — 99232 SBSQ HOSP IP/OBS MODERATE 35: CPT | Mod: ,,, | Performed by: NURSE PRACTITIONER

## 2021-05-04 PROCEDURE — 63600175 PHARM REV CODE 636 W HCPCS: Performed by: STUDENT IN AN ORGANIZED HEALTH CARE EDUCATION/TRAINING PROGRAM

## 2021-05-04 PROCEDURE — 80053 COMPREHEN METABOLIC PANEL: CPT | Performed by: INTERNAL MEDICINE

## 2021-05-04 PROCEDURE — 83735 ASSAY OF MAGNESIUM: CPT | Performed by: INTERNAL MEDICINE

## 2021-05-04 PROCEDURE — 85025 COMPLETE CBC W/AUTO DIFF WBC: CPT | Performed by: INTERNAL MEDICINE

## 2021-05-04 PROCEDURE — 63600175 PHARM REV CODE 636 W HCPCS: Performed by: INTERNAL MEDICINE

## 2021-05-04 PROCEDURE — 20600001 HC STEP DOWN PRIVATE ROOM

## 2021-05-04 PROCEDURE — 63600175 PHARM REV CODE 636 W HCPCS: Performed by: PHYSICIAN ASSISTANT

## 2021-05-04 PROCEDURE — 99233 PR SUBSEQUENT HOSPITAL CARE,LEVL III: ICD-10-PCS | Mod: ,,, | Performed by: NURSE PRACTITIONER

## 2021-05-04 PROCEDURE — 94761 N-INVAS EAR/PLS OXIMETRY MLT: CPT

## 2021-05-04 PROCEDURE — 25000003 PHARM REV CODE 250: Performed by: INTERNAL MEDICINE

## 2021-05-04 PROCEDURE — 99232 PR SUBSEQUENT HOSPITAL CARE,LEVL II: ICD-10-PCS | Mod: ,,, | Performed by: NURSE PRACTITIONER

## 2021-05-04 PROCEDURE — 99233 SBSQ HOSP IP/OBS HIGH 50: CPT | Mod: ,,, | Performed by: NURSE PRACTITIONER

## 2021-05-04 PROCEDURE — 25000003 PHARM REV CODE 250: Performed by: PHYSICIAN ASSISTANT

## 2021-05-04 PROCEDURE — 80074 ACUTE HEPATITIS PANEL: CPT | Performed by: INTERNAL MEDICINE

## 2021-05-04 PROCEDURE — 80053 COMPREHEN METABOLIC PANEL: CPT | Mod: 91 | Performed by: INTERNAL MEDICINE

## 2021-05-04 PROCEDURE — 80197 ASSAY OF TACROLIMUS: CPT | Performed by: PHYSICIAN ASSISTANT

## 2021-05-04 RX ORDER — INSULIN ASPART 100 [IU]/ML
10 INJECTION, SOLUTION INTRAVENOUS; SUBCUTANEOUS
Status: DISCONTINUED | OUTPATIENT
Start: 2021-05-04 | End: 2021-05-11 | Stop reason: HOSPADM

## 2021-05-04 RX ADMIN — FAMOTIDINE 20 MG: 20 TABLET ORAL at 08:05

## 2021-05-04 RX ADMIN — PREDNISONE 5 MG: 5 TABLET ORAL at 07:05

## 2021-05-04 RX ADMIN — MAGNESIUM 64 MG (MAGNESIUM CHLORIDE) TABLET,DELAYED RELEASE 128 MG: at 09:05

## 2021-05-04 RX ADMIN — ASPIRIN 81 MG: 81 TABLET, COATED ORAL at 07:05

## 2021-05-04 RX ADMIN — TACROLIMUS 2 MG: 1 CAPSULE ORAL at 07:05

## 2021-05-04 RX ADMIN — HEPARIN SODIUM 5000 UNITS: 5000 INJECTION INTRAVENOUS; SUBCUTANEOUS at 09:05

## 2021-05-04 RX ADMIN — INSULIN ASPART 10 UNITS: 100 INJECTION, SOLUTION INTRAVENOUS; SUBCUTANEOUS at 11:05

## 2021-05-04 RX ADMIN — HEPARIN SODIUM 5000 UNITS: 5000 INJECTION INTRAVENOUS; SUBCUTANEOUS at 08:05

## 2021-05-04 RX ADMIN — CLONIDINE HYDROCHLORIDE 0.1 MG: 0.1 TABLET ORAL at 09:05

## 2021-05-04 RX ADMIN — CLONIDINE HYDROCHLORIDE 0.1 MG: 0.1 TABLET ORAL at 07:05

## 2021-05-04 RX ADMIN — SODIUM BICARBONATE 650 MG TABLET 1300 MG: at 09:05

## 2021-05-04 RX ADMIN — CLONIDINE HYDROCHLORIDE 0.1 MG: 0.1 TABLET ORAL at 04:05

## 2021-05-04 RX ADMIN — DEXTROSE 60 MG: 50 INJECTION, SOLUTION INTRAVENOUS at 12:05

## 2021-05-04 RX ADMIN — NIFEDIPINE 60 MG: 30 TABLET, FILM COATED, EXTENDED RELEASE ORAL at 07:05

## 2021-05-04 RX ADMIN — INSULIN ASPART 8 UNITS: 100 INJECTION, SOLUTION INTRAVENOUS; SUBCUTANEOUS at 08:05

## 2021-05-04 RX ADMIN — INSULIN ASPART 1 UNITS: 100 INJECTION, SOLUTION INTRAVENOUS; SUBCUTANEOUS at 09:05

## 2021-05-04 RX ADMIN — SODIUM BICARBONATE 650 MG TABLET 1300 MG: at 07:05

## 2021-05-04 RX ADMIN — INSULIN ASPART 10 UNITS: 100 INJECTION, SOLUTION INTRAVENOUS; SUBCUTANEOUS at 04:05

## 2021-05-04 RX ADMIN — TACROLIMUS 1.5 MG: 1 CAPSULE ORAL at 05:05

## 2021-05-04 RX ADMIN — INSULIN ASPART 2 UNITS: 100 INJECTION, SOLUTION INTRAVENOUS; SUBCUTANEOUS at 12:05

## 2021-05-04 RX ADMIN — INSULIN DETEMIR 24 UNITS: 100 INJECTION, SOLUTION SUBCUTANEOUS at 09:05

## 2021-05-05 ENCOUNTER — TELEPHONE (OUTPATIENT)
Dept: TRANSPLANT | Facility: HOSPITAL | Age: 61
End: 2021-05-05

## 2021-05-05 PROBLEM — N17.9 AKI (ACUTE KIDNEY INJURY): Status: RESOLVED | Noted: 2021-01-11 | Resolved: 2021-05-05

## 2021-05-05 LAB
ALBUMIN SERPL BCP-MCNC: 3.1 G/DL (ref 3.5–5.2)
ALP SERPL-CCNC: 73 U/L (ref 55–135)
ALT SERPL W/O P-5'-P-CCNC: 351 U/L (ref 10–44)
ANION GAP SERPL CALC-SCNC: 15 MMOL/L (ref 8–16)
AST SERPL-CCNC: 86 U/L (ref 10–40)
BASOPHILS # BLD AUTO: 0.01 K/UL (ref 0–0.2)
BASOPHILS NFR BLD: 0.1 % (ref 0–1.9)
BILIRUB SERPL-MCNC: 0.5 MG/DL (ref 0.1–1)
BUN SERPL-MCNC: 117 MG/DL (ref 6–20)
CALCIUM SERPL-MCNC: 7.2 MG/DL (ref 8.7–10.5)
CHLORIDE SERPL-SCNC: 97 MMOL/L (ref 95–110)
CO2 SERPL-SCNC: 24 MMOL/L (ref 23–29)
CREAT SERPL-MCNC: 10.5 MG/DL (ref 0.5–1.4)
DIFFERENTIAL METHOD: ABNORMAL
EOSINOPHIL # BLD AUTO: 0.2 K/UL (ref 0–0.5)
EOSINOPHIL NFR BLD: 1.5 % (ref 0–8)
ERYTHROCYTE [DISTWIDTH] IN BLOOD BY AUTOMATED COUNT: 12.5 % (ref 11.5–14.5)
EST. GFR  (AFRICAN AMERICAN): 5.6 ML/MIN/1.73 M^2
EST. GFR  (NON AFRICAN AMERICAN): 4.8 ML/MIN/1.73 M^2
GLUCOSE SERPL-MCNC: 184 MG/DL (ref 70–110)
HCT VFR BLD AUTO: 23 % (ref 40–54)
HGB BLD-MCNC: 7.6 G/DL (ref 14–18)
IMM GRANULOCYTES # BLD AUTO: 0.17 K/UL (ref 0–0.04)
IMM GRANULOCYTES NFR BLD AUTO: 1.5 % (ref 0–0.5)
LYMPHOCYTES # BLD AUTO: 1.9 K/UL (ref 1–4.8)
LYMPHOCYTES NFR BLD: 16.5 % (ref 18–48)
MAGNESIUM SERPL-MCNC: 2.6 MG/DL (ref 1.6–2.6)
MCH RBC QN AUTO: 28.8 PG (ref 27–31)
MCHC RBC AUTO-ENTMCNC: 33 G/DL (ref 32–36)
MCV RBC AUTO: 87 FL (ref 82–98)
MONOCYTES # BLD AUTO: 1.3 K/UL (ref 0.3–1)
MONOCYTES NFR BLD: 11.2 % (ref 4–15)
NEUTROPHILS # BLD AUTO: 7.9 K/UL (ref 1.8–7.7)
NEUTROPHILS NFR BLD: 69.2 % (ref 38–73)
NRBC BLD-RTO: 0 /100 WBC
PHOSPHATE SERPL-MCNC: 8.2 MG/DL (ref 2.7–4.5)
PLATELET # BLD AUTO: 226 K/UL (ref 150–450)
PMV BLD AUTO: 9.4 FL (ref 9.2–12.9)
POCT GLUCOSE: 103 MG/DL (ref 70–110)
POCT GLUCOSE: 117 MG/DL (ref 70–110)
POCT GLUCOSE: 130 MG/DL (ref 70–110)
POCT GLUCOSE: 213 MG/DL (ref 70–110)
POCT GLUCOSE: 230 MG/DL (ref 70–110)
POTASSIUM SERPL-SCNC: 4.2 MMOL/L (ref 3.5–5.1)
PROT SERPL-MCNC: 5.6 G/DL (ref 6–8.4)
RBC # BLD AUTO: 2.64 M/UL (ref 4.6–6.2)
SODIUM SERPL-SCNC: 136 MMOL/L (ref 136–145)
TACROLIMUS BLD-MCNC: 4.2 NG/ML (ref 5–15)
TB INDURATION 48 - 72 HR READ: 0 MM
WBC # BLD AUTO: 11.47 K/UL (ref 3.9–12.7)

## 2021-05-05 PROCEDURE — 80053 COMPREHEN METABOLIC PANEL: CPT | Performed by: INTERNAL MEDICINE

## 2021-05-05 PROCEDURE — 25000003 PHARM REV CODE 250

## 2021-05-05 PROCEDURE — 99232 SBSQ HOSP IP/OBS MODERATE 35: CPT | Mod: ,,, | Performed by: NURSE PRACTITIONER

## 2021-05-05 PROCEDURE — 90935 PR HEMODIALYSIS, ONE EVALUATION: ICD-10-PCS | Mod: ,,, | Performed by: NURSE PRACTITIONER

## 2021-05-05 PROCEDURE — 63600175 PHARM REV CODE 636 W HCPCS: Performed by: STUDENT IN AN ORGANIZED HEALTH CARE EDUCATION/TRAINING PROGRAM

## 2021-05-05 PROCEDURE — 99233 PR SUBSEQUENT HOSPITAL CARE,LEVL III: ICD-10-PCS | Mod: GC,,, | Performed by: INTERNAL MEDICINE

## 2021-05-05 PROCEDURE — 63600175 PHARM REV CODE 636 W HCPCS: Performed by: INTERNAL MEDICINE

## 2021-05-05 PROCEDURE — 25000003 PHARM REV CODE 250: Performed by: NURSE PRACTITIONER

## 2021-05-05 PROCEDURE — 99233 SBSQ HOSP IP/OBS HIGH 50: CPT | Mod: GC,,, | Performed by: INTERNAL MEDICINE

## 2021-05-05 PROCEDURE — 83735 ASSAY OF MAGNESIUM: CPT | Performed by: INTERNAL MEDICINE

## 2021-05-05 PROCEDURE — 80197 ASSAY OF TACROLIMUS: CPT | Performed by: PHYSICIAN ASSISTANT

## 2021-05-05 PROCEDURE — 99232 PR SUBSEQUENT HOSPITAL CARE,LEVL II: ICD-10-PCS | Mod: ,,, | Performed by: NURSE PRACTITIONER

## 2021-05-05 PROCEDURE — 84100 ASSAY OF PHOSPHORUS: CPT | Performed by: INTERNAL MEDICINE

## 2021-05-05 PROCEDURE — 63700000 PHARM REV CODE 250 ALT 637 W/O HCPCS: Performed by: PHYSICIAN ASSISTANT

## 2021-05-05 PROCEDURE — 63600175 PHARM REV CODE 636 W HCPCS: Performed by: PHYSICIAN ASSISTANT

## 2021-05-05 PROCEDURE — 90935 HEMODIALYSIS ONE EVALUATION: CPT | Mod: ,,, | Performed by: NURSE PRACTITIONER

## 2021-05-05 PROCEDURE — 90935 HEMODIALYSIS ONE EVALUATION: CPT

## 2021-05-05 PROCEDURE — 20600001 HC STEP DOWN PRIVATE ROOM

## 2021-05-05 PROCEDURE — 85025 COMPLETE CBC W/AUTO DIFF WBC: CPT | Performed by: INTERNAL MEDICINE

## 2021-05-05 PROCEDURE — 25000003 PHARM REV CODE 250: Performed by: PHYSICIAN ASSISTANT

## 2021-05-05 PROCEDURE — 25000003 PHARM REV CODE 250: Performed by: INTERNAL MEDICINE

## 2021-05-05 RX ORDER — DAPSONE 25 MG/1
50 TABLET ORAL DAILY
Status: DISCONTINUED | OUTPATIENT
Start: 2021-05-05 | End: 2021-05-06

## 2021-05-05 RX ORDER — SEVELAMER CARBONATE 800 MG/1
1600 TABLET, FILM COATED ORAL
Status: DISCONTINUED | OUTPATIENT
Start: 2021-05-05 | End: 2021-05-11 | Stop reason: HOSPADM

## 2021-05-05 RX ORDER — CLONIDINE HYDROCHLORIDE 0.1 MG/1
0.2 TABLET ORAL 3 TIMES DAILY
Status: DISCONTINUED | OUTPATIENT
Start: 2021-05-05 | End: 2021-05-11 | Stop reason: HOSPADM

## 2021-05-05 RX ORDER — SODIUM CHLORIDE 9 MG/ML
INJECTION, SOLUTION INTRAVENOUS ONCE
Status: COMPLETED | OUTPATIENT
Start: 2021-05-05 | End: 2021-05-05

## 2021-05-05 RX ADMIN — FAMOTIDINE 20 MG: 20 TABLET ORAL at 08:05

## 2021-05-05 RX ADMIN — CLONIDINE HYDROCHLORIDE 0.2 MG: 0.1 TABLET ORAL at 03:05

## 2021-05-05 RX ADMIN — TACROLIMUS 2 MG: 1 CAPSULE ORAL at 08:05

## 2021-05-05 RX ADMIN — CLONIDINE HYDROCHLORIDE 0.1 MG: 0.1 TABLET ORAL at 08:05

## 2021-05-05 RX ADMIN — CLONIDINE HYDROCHLORIDE 0.2 MG: 0.1 TABLET ORAL at 08:05

## 2021-05-05 RX ADMIN — PREDNISONE 5 MG: 5 TABLET ORAL at 08:05

## 2021-05-05 RX ADMIN — HEPARIN SODIUM 5000 UNITS: 5000 INJECTION INTRAVENOUS; SUBCUTANEOUS at 08:05

## 2021-05-05 RX ADMIN — ASPIRIN 81 MG: 81 TABLET, COATED ORAL at 08:05

## 2021-05-05 RX ADMIN — DAPSONE 50 MG: 25 TABLET ORAL at 05:05

## 2021-05-05 RX ADMIN — NIFEDIPINE 60 MG: 30 TABLET, FILM COATED, EXTENDED RELEASE ORAL at 08:05

## 2021-05-05 RX ADMIN — INSULIN ASPART 10 UNITS: 100 INJECTION, SOLUTION INTRAVENOUS; SUBCUTANEOUS at 06:05

## 2021-05-05 RX ADMIN — INSULIN DETEMIR 24 UNITS: 100 INJECTION, SOLUTION SUBCUTANEOUS at 08:05

## 2021-05-05 RX ADMIN — INSULIN ASPART 2 UNITS: 100 INJECTION, SOLUTION INTRAVENOUS; SUBCUTANEOUS at 06:05

## 2021-05-05 RX ADMIN — AZITHROMYCIN MONOHYDRATE 250 MG: 250 TABLET ORAL at 05:05

## 2021-05-05 RX ADMIN — SEVELAMER CARBONATE 1600 MG: 800 TABLET, FILM COATED ORAL at 06:05

## 2021-05-05 RX ADMIN — SODIUM BICARBONATE 650 MG TABLET 1300 MG: at 08:05

## 2021-05-05 RX ADMIN — TACROLIMUS 1.5 MG: 1 CAPSULE ORAL at 06:05

## 2021-05-05 RX ADMIN — SODIUM CHLORIDE: 0.9 INJECTION, SOLUTION INTRAVENOUS at 09:05

## 2021-05-05 RX ADMIN — MAGNESIUM 64 MG (MAGNESIUM CHLORIDE) TABLET,DELAYED RELEASE 128 MG: at 01:05

## 2021-05-05 RX ADMIN — INSULIN ASPART 10 UNITS: 100 INJECTION, SOLUTION INTRAVENOUS; SUBCUTANEOUS at 01:05

## 2021-05-05 RX ADMIN — MAGNESIUM 64 MG (MAGNESIUM CHLORIDE) TABLET,DELAYED RELEASE 128 MG: at 11:05

## 2021-05-06 ENCOUNTER — TELEPHONE (OUTPATIENT)
Dept: TRANSPLANT | Facility: CLINIC | Age: 61
End: 2021-05-06

## 2021-05-06 LAB
ALBUMIN SERPL BCP-MCNC: 2.9 G/DL (ref 3.5–5.2)
ALP SERPL-CCNC: 76 U/L (ref 55–135)
ALT SERPL W/O P-5'-P-CCNC: 269 U/L (ref 10–44)
ANION GAP SERPL CALC-SCNC: 8 MMOL/L (ref 8–16)
APTT BLDCRRT: 26.8 SEC (ref 21–32)
AST SERPL-CCNC: 80 U/L (ref 10–40)
BASOPHILS # BLD AUTO: 0.02 K/UL (ref 0–0.2)
BASOPHILS NFR BLD: 0.2 % (ref 0–1.9)
BILIRUB SERPL-MCNC: 0.4 MG/DL (ref 0.1–1)
BUN SERPL-MCNC: 76 MG/DL (ref 6–20)
CALCIUM SERPL-MCNC: 7.4 MG/DL (ref 8.7–10.5)
CHLORIDE SERPL-SCNC: 102 MMOL/L (ref 95–110)
CHOLEST SERPL-MCNC: 119 MG/DL (ref 120–199)
CHOLEST/HDLC SERPL: 3.6 {RATIO} (ref 2–5)
CO2 SERPL-SCNC: 26 MMOL/L (ref 23–29)
CREAT SERPL-MCNC: 8.4 MG/DL (ref 0.5–1.4)
DIFFERENTIAL METHOD: ABNORMAL
EOSINOPHIL # BLD AUTO: 0.3 K/UL (ref 0–0.5)
EOSINOPHIL NFR BLD: 3.5 % (ref 0–8)
ERYTHROCYTE [DISTWIDTH] IN BLOOD BY AUTOMATED COUNT: 12.6 % (ref 11.5–14.5)
EST. GFR  (AFRICAN AMERICAN): 7.3 ML/MIN/1.73 M^2
EST. GFR  (NON AFRICAN AMERICAN): 6.3 ML/MIN/1.73 M^2
GLUCOSE SERPL-MCNC: 124 MG/DL (ref 70–110)
HCT VFR BLD AUTO: 22.4 % (ref 40–54)
HDLC SERPL-MCNC: 33 MG/DL (ref 40–75)
HDLC SERPL: 27.7 % (ref 20–50)
HGB BLD-MCNC: 7.1 G/DL (ref 14–18)
IMM GRANULOCYTES # BLD AUTO: 0.25 K/UL (ref 0–0.04)
IMM GRANULOCYTES NFR BLD AUTO: 2.9 % (ref 0–0.5)
INR PPP: 0.9 (ref 0.8–1.2)
LDLC SERPL CALC-MCNC: 52.6 MG/DL (ref 63–159)
LYMPHOCYTES # BLD AUTO: 1.4 K/UL (ref 1–4.8)
LYMPHOCYTES NFR BLD: 16.7 % (ref 18–48)
MAGNESIUM SERPL-MCNC: 2.2 MG/DL (ref 1.6–2.6)
MCH RBC QN AUTO: 27.7 PG (ref 27–31)
MCHC RBC AUTO-ENTMCNC: 31.7 G/DL (ref 32–36)
MCV RBC AUTO: 88 FL (ref 82–98)
MONOCYTES # BLD AUTO: 1.3 K/UL (ref 0.3–1)
MONOCYTES NFR BLD: 14.7 % (ref 4–15)
NEUTROPHILS # BLD AUTO: 5.4 K/UL (ref 1.8–7.7)
NEUTROPHILS NFR BLD: 62 % (ref 38–73)
NONHDLC SERPL-MCNC: 86 MG/DL
NRBC BLD-RTO: 0 /100 WBC
PHOSPHATE SERPL-MCNC: 6.4 MG/DL (ref 2.7–4.5)
PLATELET # BLD AUTO: 235 K/UL (ref 150–450)
PMV BLD AUTO: 9.3 FL (ref 9.2–12.9)
POCT GLUCOSE: 116 MG/DL (ref 70–110)
POCT GLUCOSE: 172 MG/DL (ref 70–110)
POCT GLUCOSE: 218 MG/DL (ref 70–110)
POCT GLUCOSE: 91 MG/DL (ref 70–110)
POTASSIUM SERPL-SCNC: 4.2 MMOL/L (ref 3.5–5.1)
PROT SERPL-MCNC: 5.3 G/DL (ref 6–8.4)
PROTHROMBIN TIME: 9.6 SEC (ref 9–12.5)
RBC # BLD AUTO: 2.56 M/UL (ref 4.6–6.2)
SODIUM SERPL-SCNC: 136 MMOL/L (ref 136–145)
TACROLIMUS BLD-MCNC: 3.9 NG/ML (ref 5–15)
TRIGL SERPL-MCNC: 167 MG/DL (ref 30–150)
WBC # BLD AUTO: 8.64 K/UL (ref 3.9–12.7)

## 2021-05-06 PROCEDURE — 25000003 PHARM REV CODE 250: Performed by: PHYSICIAN ASSISTANT

## 2021-05-06 PROCEDURE — 99232 PR SUBSEQUENT HOSPITAL CARE,LEVL II: ICD-10-PCS | Mod: ,,, | Performed by: INTERNAL MEDICINE

## 2021-05-06 PROCEDURE — 99232 SBSQ HOSP IP/OBS MODERATE 35: CPT | Mod: ,,, | Performed by: INTERNAL MEDICINE

## 2021-05-06 PROCEDURE — 99232 SBSQ HOSP IP/OBS MODERATE 35: CPT | Mod: ,,, | Performed by: NURSE PRACTITIONER

## 2021-05-06 PROCEDURE — 20600001 HC STEP DOWN PRIVATE ROOM

## 2021-05-06 PROCEDURE — 25000003 PHARM REV CODE 250: Performed by: NURSE PRACTITIONER

## 2021-05-06 PROCEDURE — 63600175 PHARM REV CODE 636 W HCPCS: Performed by: INTERNAL MEDICINE

## 2021-05-06 PROCEDURE — 63600175 PHARM REV CODE 636 W HCPCS: Performed by: STUDENT IN AN ORGANIZED HEALTH CARE EDUCATION/TRAINING PROGRAM

## 2021-05-06 PROCEDURE — 84100 ASSAY OF PHOSPHORUS: CPT | Performed by: INTERNAL MEDICINE

## 2021-05-06 PROCEDURE — 83735 ASSAY OF MAGNESIUM: CPT | Performed by: INTERNAL MEDICINE

## 2021-05-06 PROCEDURE — 80053 COMPREHEN METABOLIC PANEL: CPT | Performed by: INTERNAL MEDICINE

## 2021-05-06 PROCEDURE — 25000003 PHARM REV CODE 250

## 2021-05-06 PROCEDURE — 85730 THROMBOPLASTIN TIME PARTIAL: CPT

## 2021-05-06 PROCEDURE — 63600175 PHARM REV CODE 636 W HCPCS: Performed by: PHYSICIAN ASSISTANT

## 2021-05-06 PROCEDURE — 85025 COMPLETE CBC W/AUTO DIFF WBC: CPT | Performed by: INTERNAL MEDICINE

## 2021-05-06 PROCEDURE — 80197 ASSAY OF TACROLIMUS: CPT | Performed by: PHYSICIAN ASSISTANT

## 2021-05-06 PROCEDURE — 99232 PR SUBSEQUENT HOSPITAL CARE,LEVL II: ICD-10-PCS | Mod: ,,, | Performed by: NURSE PRACTITIONER

## 2021-05-06 PROCEDURE — 25000003 PHARM REV CODE 250: Performed by: INTERNAL MEDICINE

## 2021-05-06 PROCEDURE — 80061 LIPID PANEL: CPT | Performed by: INTERNAL MEDICINE

## 2021-05-06 PROCEDURE — 85610 PROTHROMBIN TIME: CPT

## 2021-05-06 RX ORDER — TACROLIMUS 1 MG/1
1 CAPSULE ORAL ONCE
Status: COMPLETED | OUTPATIENT
Start: 2021-05-06 | End: 2021-05-06

## 2021-05-06 RX ORDER — SULFAMETHOXAZOLE AND TRIMETHOPRIM 400; 80 MG/1; MG/1
1 TABLET ORAL
Status: DISCONTINUED | OUTPATIENT
Start: 2021-05-07 | End: 2021-05-11 | Stop reason: HOSPADM

## 2021-05-06 RX ORDER — TACROLIMUS 1 MG/1
2 CAPSULE ORAL 2 TIMES DAILY
Status: DISCONTINUED | OUTPATIENT
Start: 2021-05-06 | End: 2021-05-11 | Stop reason: HOSPADM

## 2021-05-06 RX ORDER — SODIUM CHLORIDE 9 MG/ML
INJECTION, SOLUTION INTRAVENOUS CONTINUOUS
Status: CANCELLED | OUTPATIENT
Start: 2021-05-06 | End: 2021-05-06

## 2021-05-06 RX ADMIN — CLONIDINE HYDROCHLORIDE 0.2 MG: 0.1 TABLET ORAL at 08:05

## 2021-05-06 RX ADMIN — INSULIN ASPART 10 UNITS: 100 INJECTION, SOLUTION INTRAVENOUS; SUBCUTANEOUS at 05:05

## 2021-05-06 RX ADMIN — INSULIN ASPART 1 UNITS: 100 INJECTION, SOLUTION INTRAVENOUS; SUBCUTANEOUS at 07:05

## 2021-05-06 RX ADMIN — FAMOTIDINE 20 MG: 20 TABLET ORAL at 08:05

## 2021-05-06 RX ADMIN — MAGNESIUM 64 MG (MAGNESIUM CHLORIDE) TABLET,DELAYED RELEASE 128 MG: at 08:05

## 2021-05-06 RX ADMIN — SEVELAMER CARBONATE 1600 MG: 800 TABLET, FILM COATED ORAL at 02:05

## 2021-05-06 RX ADMIN — INSULIN ASPART 2 UNITS: 100 INJECTION, SOLUTION INTRAVENOUS; SUBCUTANEOUS at 05:05

## 2021-05-06 RX ADMIN — TACROLIMUS 1 MG: 1 CAPSULE ORAL at 01:05

## 2021-05-06 RX ADMIN — HEPARIN SODIUM 5000 UNITS: 5000 INJECTION INTRAVENOUS; SUBCUTANEOUS at 08:05

## 2021-05-06 RX ADMIN — SEVELAMER CARBONATE 1600 MG: 800 TABLET, FILM COATED ORAL at 05:05

## 2021-05-06 RX ADMIN — INSULIN ASPART 10 UNITS: 100 INJECTION, SOLUTION INTRAVENOUS; SUBCUTANEOUS at 02:05

## 2021-05-06 RX ADMIN — SODIUM BICARBONATE 650 MG TABLET 1300 MG: at 08:05

## 2021-05-06 RX ADMIN — DAPSONE 50 MG: 25 TABLET ORAL at 08:05

## 2021-05-06 RX ADMIN — TACROLIMUS 2 MG: 1 CAPSULE ORAL at 05:05

## 2021-05-06 RX ADMIN — MAGNESIUM 64 MG (MAGNESIUM CHLORIDE) TABLET,DELAYED RELEASE 128 MG: at 09:05

## 2021-05-06 RX ADMIN — TACROLIMUS 2 MG: 1 CAPSULE ORAL at 08:05

## 2021-05-06 RX ADMIN — CLONIDINE HYDROCHLORIDE 0.2 MG: 0.1 TABLET ORAL at 04:05

## 2021-05-06 RX ADMIN — PREDNISONE 5 MG: 5 TABLET ORAL at 08:05

## 2021-05-06 RX ADMIN — ASPIRIN 81 MG: 81 TABLET, COATED ORAL at 08:05

## 2021-05-06 RX ADMIN — NIFEDIPINE 60 MG: 30 TABLET, FILM COATED, EXTENDED RELEASE ORAL at 08:05

## 2021-05-07 LAB
ALBUMIN SERPL BCP-MCNC: 3.1 G/DL (ref 3.5–5.2)
ALP SERPL-CCNC: 74 U/L (ref 55–135)
ALT SERPL W/O P-5'-P-CCNC: 201 U/L (ref 10–44)
ANION GAP SERPL CALC-SCNC: 11 MMOL/L (ref 8–16)
APTT BLDCRRT: 22.9 SEC (ref 21–32)
AST SERPL-CCNC: 46 U/L (ref 10–40)
BASOPHILS # BLD AUTO: 0.01 K/UL (ref 0–0.2)
BASOPHILS NFR BLD: 0.1 % (ref 0–1.9)
BILIRUB SERPL-MCNC: 0.5 MG/DL (ref 0.1–1)
BUN SERPL-MCNC: 89 MG/DL (ref 6–20)
CALCIUM SERPL-MCNC: 8 MG/DL (ref 8.7–10.5)
CHLORIDE SERPL-SCNC: 100 MMOL/L (ref 95–110)
CO2 SERPL-SCNC: 24 MMOL/L (ref 23–29)
CREAT SERPL-MCNC: 9.6 MG/DL (ref 0.5–1.4)
DIFFERENTIAL METHOD: ABNORMAL
EOSINOPHIL # BLD AUTO: 0.4 K/UL (ref 0–0.5)
EOSINOPHIL NFR BLD: 3.8 % (ref 0–8)
ERYTHROCYTE [DISTWIDTH] IN BLOOD BY AUTOMATED COUNT: 12.6 % (ref 11.5–14.5)
EST. GFR  (AFRICAN AMERICAN): 6.2 ML/MIN/1.73 M^2
EST. GFR  (NON AFRICAN AMERICAN): 5.4 ML/MIN/1.73 M^2
GLUCOSE SERPL-MCNC: 99 MG/DL (ref 70–110)
HCT VFR BLD AUTO: 23.3 % (ref 40–54)
HGB BLD-MCNC: 7.3 G/DL (ref 14–18)
IMM GRANULOCYTES # BLD AUTO: 0.34 K/UL (ref 0–0.04)
IMM GRANULOCYTES NFR BLD AUTO: 3.7 % (ref 0–0.5)
INR PPP: 0.9 (ref 0.8–1.2)
LYMPHOCYTES # BLD AUTO: 1.5 K/UL (ref 1–4.8)
LYMPHOCYTES NFR BLD: 15.8 % (ref 18–48)
MAGNESIUM SERPL-MCNC: 2.3 MG/DL (ref 1.6–2.6)
MCH RBC QN AUTO: 28.1 PG (ref 27–31)
MCHC RBC AUTO-ENTMCNC: 31.3 G/DL (ref 32–36)
MCV RBC AUTO: 90 FL (ref 82–98)
MONOCYTES # BLD AUTO: 1.3 K/UL (ref 0.3–1)
MONOCYTES NFR BLD: 14.1 % (ref 4–15)
NEUTROPHILS # BLD AUTO: 5.8 K/UL (ref 1.8–7.7)
NEUTROPHILS NFR BLD: 62.5 % (ref 38–73)
NRBC BLD-RTO: 0 /100 WBC
PLATELET # BLD AUTO: 264 K/UL (ref 150–450)
PMV BLD AUTO: 9.2 FL (ref 9.2–12.9)
POCT GLUCOSE: 139 MG/DL (ref 70–110)
POCT GLUCOSE: 195 MG/DL (ref 70–110)
POCT GLUCOSE: 235 MG/DL (ref 70–110)
POCT GLUCOSE: 276 MG/DL (ref 70–110)
POCT GLUCOSE: 90 MG/DL (ref 70–110)
POTASSIUM SERPL-SCNC: 4.2 MMOL/L (ref 3.5–5.1)
PROT SERPL-MCNC: 5.7 G/DL (ref 6–8.4)
PROTHROMBIN TIME: 9.5 SEC (ref 9–12.5)
RBC # BLD AUTO: 2.6 M/UL (ref 4.6–6.2)
SARS-COV-2 RDRP RESP QL NAA+PROBE: NEGATIVE
SODIUM SERPL-SCNC: 135 MMOL/L (ref 136–145)
TACROLIMUS BLD-MCNC: 6.4 NG/ML (ref 5–15)
TRAC LUNG, DD-CFDNA: NORMAL
WBC # BLD AUTO: 9.3 K/UL (ref 3.9–12.7)

## 2021-05-07 PROCEDURE — 63600175 PHARM REV CODE 636 W HCPCS: Performed by: PHYSICIAN ASSISTANT

## 2021-05-07 PROCEDURE — 99152 MOD SED SAME PHYS/QHP 5/>YRS: CPT | Performed by: INTERNAL MEDICINE

## 2021-05-07 PROCEDURE — 76937 US GUIDE VASCULAR ACCESS: CPT | Mod: 26,,, | Performed by: INTERNAL MEDICINE

## 2021-05-07 PROCEDURE — 36558 INSERT TUNNELED CV CATH: CPT | Mod: RT | Performed by: INTERNAL MEDICINE

## 2021-05-07 PROCEDURE — 83735 ASSAY OF MAGNESIUM: CPT | Performed by: INTERNAL MEDICINE

## 2021-05-07 PROCEDURE — C1769 GUIDE WIRE: HCPCS | Performed by: INTERNAL MEDICINE

## 2021-05-07 PROCEDURE — 77001 FLUOROGUIDE FOR VEIN DEVICE: CPT | Mod: 26,,, | Performed by: INTERNAL MEDICINE

## 2021-05-07 PROCEDURE — 63600175 PHARM REV CODE 636 W HCPCS: Performed by: INTERNAL MEDICINE

## 2021-05-07 PROCEDURE — 36558 INSERT TUNNELED CV CATH: CPT | Mod: RT,,, | Performed by: INTERNAL MEDICINE

## 2021-05-07 PROCEDURE — 85025 COMPLETE CBC W/AUTO DIFF WBC: CPT | Performed by: INTERNAL MEDICINE

## 2021-05-07 PROCEDURE — 88184 FLOWCYTOMETRY/ TC 1 MARKER: CPT | Performed by: INTERNAL MEDICINE

## 2021-05-07 PROCEDURE — 99232 PR SUBSEQUENT HOSPITAL CARE,LEVL II: ICD-10-PCS | Mod: ,,, | Performed by: PHYSICIAN ASSISTANT

## 2021-05-07 PROCEDURE — 76937 PR  US GUIDE, VASCULAR ACCESS: ICD-10-PCS | Mod: 26,,, | Performed by: INTERNAL MEDICINE

## 2021-05-07 PROCEDURE — 80053 COMPREHEN METABOLIC PANEL: CPT | Performed by: INTERNAL MEDICINE

## 2021-05-07 PROCEDURE — 27000221 HC OXYGEN, UP TO 24 HOURS

## 2021-05-07 PROCEDURE — 99233 SBSQ HOSP IP/OBS HIGH 50: CPT | Mod: ,,, | Performed by: INTERNAL MEDICINE

## 2021-05-07 PROCEDURE — 36558 PR INSERT TUNNELED CV CATH W/O PORT OR PUMP: ICD-10-PCS | Mod: RT,,, | Performed by: INTERNAL MEDICINE

## 2021-05-07 PROCEDURE — 25000003 PHARM REV CODE 250: Performed by: PHYSICIAN ASSISTANT

## 2021-05-07 PROCEDURE — 85730 THROMBOPLASTIN TIME PARTIAL: CPT | Performed by: NURSE PRACTITIONER

## 2021-05-07 PROCEDURE — 99153 MOD SED SAME PHYS/QHP EA: CPT | Performed by: INTERNAL MEDICINE

## 2021-05-07 PROCEDURE — 25000003 PHARM REV CODE 250: Performed by: INTERNAL MEDICINE

## 2021-05-07 PROCEDURE — 76937 US GUIDE VASCULAR ACCESS: CPT | Performed by: INTERNAL MEDICINE

## 2021-05-07 PROCEDURE — 63700000 PHARM REV CODE 250 ALT 637 W/O HCPCS: Performed by: PHYSICIAN ASSISTANT

## 2021-05-07 PROCEDURE — 80197 ASSAY OF TACROLIMUS: CPT | Performed by: PHYSICIAN ASSISTANT

## 2021-05-07 PROCEDURE — 63600175 PHARM REV CODE 636 W HCPCS: Performed by: NURSE PRACTITIONER

## 2021-05-07 PROCEDURE — 25000003 PHARM REV CODE 250

## 2021-05-07 PROCEDURE — 77001 FLUOROGUIDE FOR VEIN DEVICE: CPT | Performed by: INTERNAL MEDICINE

## 2021-05-07 PROCEDURE — 36415 COLL VENOUS BLD VENIPUNCTURE: CPT | Performed by: INTERNAL MEDICINE

## 2021-05-07 PROCEDURE — 25000003 PHARM REV CODE 250: Performed by: NURSE PRACTITIONER

## 2021-05-07 PROCEDURE — 99232 SBSQ HOSP IP/OBS MODERATE 35: CPT | Mod: ,,, | Performed by: NURSE PRACTITIONER

## 2021-05-07 PROCEDURE — 77001 CHG FLUOROGUIDE CNTRL VEN ACCESS,PLACE,REPLACE,REMOVE: ICD-10-PCS | Mod: 26,,, | Performed by: INTERNAL MEDICINE

## 2021-05-07 PROCEDURE — 99232 SBSQ HOSP IP/OBS MODERATE 35: CPT | Mod: ,,, | Performed by: PHYSICIAN ASSISTANT

## 2021-05-07 PROCEDURE — 20600001 HC STEP DOWN PRIVATE ROOM

## 2021-05-07 PROCEDURE — 99232 PR SUBSEQUENT HOSPITAL CARE,LEVL II: ICD-10-PCS | Mod: ,,, | Performed by: NURSE PRACTITIONER

## 2021-05-07 PROCEDURE — U0002 COVID-19 LAB TEST NON-CDC: HCPCS | Performed by: INTERNAL MEDICINE

## 2021-05-07 PROCEDURE — 85610 PROTHROMBIN TIME: CPT | Performed by: NURSE PRACTITIONER

## 2021-05-07 PROCEDURE — 99233 PR SUBSEQUENT HOSPITAL CARE,LEVL III: ICD-10-PCS | Mod: ,,, | Performed by: INTERNAL MEDICINE

## 2021-05-07 DEVICE — 16F X 28CM SPLIT CATH®III CATHETER SETCATHETER SET (CUFF 23CM FROM TIP)(CUFF 23CM F
Type: IMPLANTABLE DEVICE | Site: NECK | Status: FUNCTIONAL
Brand: SPLIT CATH®III

## 2021-05-07 RX ORDER — ACETAMINOPHEN 10 MG/ML
INJECTION, SOLUTION INTRAVENOUS
Status: DISCONTINUED | OUTPATIENT
Start: 2021-05-07 | End: 2021-05-11 | Stop reason: HOSPADM

## 2021-05-07 RX ORDER — LIDOCAINE HYDROCHLORIDE 20 MG/ML
INJECTION, SOLUTION EPIDURAL; INFILTRATION; INTRACAUDAL; PERINEURAL
Status: DISCONTINUED | OUTPATIENT
Start: 2021-05-07 | End: 2021-05-07 | Stop reason: HOSPADM

## 2021-05-07 RX ORDER — LIDOCAINE HYDROCHLORIDE AND EPINEPHRINE 10; 10 MG/ML; UG/ML
INJECTION, SOLUTION INFILTRATION; PERINEURAL
Status: DISCONTINUED | OUTPATIENT
Start: 2021-05-07 | End: 2021-05-07 | Stop reason: HOSPADM

## 2021-05-07 RX ORDER — SODIUM CHLORIDE 9 MG/ML
INJECTION, SOLUTION INTRAVENOUS
Status: DISCONTINUED | OUTPATIENT
Start: 2021-05-07 | End: 2021-05-11 | Stop reason: HOSPADM

## 2021-05-07 RX ORDER — SODIUM CHLORIDE 9 MG/ML
INJECTION, SOLUTION INTRAVENOUS ONCE
Status: DISCONTINUED | OUTPATIENT
Start: 2021-05-08 | End: 2021-05-11 | Stop reason: HOSPADM

## 2021-05-07 RX ORDER — MIDAZOLAM HYDROCHLORIDE 1 MG/ML
INJECTION, SOLUTION INTRAMUSCULAR; INTRAVENOUS
Status: DISCONTINUED | OUTPATIENT
Start: 2021-05-07 | End: 2021-05-07 | Stop reason: HOSPADM

## 2021-05-07 RX ORDER — NITROGLYCERIN 400 UG/1
SPRAY ORAL
Status: DISCONTINUED | OUTPATIENT
Start: 2021-05-07 | End: 2021-05-07 | Stop reason: HOSPADM

## 2021-05-07 RX ADMIN — ASPIRIN 81 MG: 81 TABLET, COATED ORAL at 08:05

## 2021-05-07 RX ADMIN — NIFEDIPINE 60 MG: 30 TABLET, FILM COATED, EXTENDED RELEASE ORAL at 08:05

## 2021-05-07 RX ADMIN — FAMOTIDINE 20 MG: 20 TABLET ORAL at 08:05

## 2021-05-07 RX ADMIN — SODIUM BICARBONATE 650 MG TABLET 1300 MG: at 08:05

## 2021-05-07 RX ADMIN — CLONIDINE HYDROCHLORIDE 0.2 MG: 0.1 TABLET ORAL at 08:05

## 2021-05-07 RX ADMIN — SULFAMETHOXAZOLE AND TRIMETHOPRIM 1 TABLET: 400; 80 TABLET ORAL at 08:05

## 2021-05-07 RX ADMIN — TACROLIMUS 2 MG: 1 CAPSULE ORAL at 05:05

## 2021-05-07 RX ADMIN — TACROLIMUS 2 MG: 1 CAPSULE ORAL at 08:05

## 2021-05-07 RX ADMIN — SEVELAMER CARBONATE 1600 MG: 800 TABLET, FILM COATED ORAL at 05:05

## 2021-05-07 RX ADMIN — CLONIDINE HYDROCHLORIDE 0.2 MG: 0.1 TABLET ORAL at 03:05

## 2021-05-07 RX ADMIN — INSULIN ASPART 10 UNITS: 100 INJECTION, SOLUTION INTRAVENOUS; SUBCUTANEOUS at 05:05

## 2021-05-07 RX ADMIN — MAGNESIUM 64 MG (MAGNESIUM CHLORIDE) TABLET,DELAYED RELEASE 128 MG: at 08:05

## 2021-05-07 RX ADMIN — AZITHROMYCIN MONOHYDRATE 250 MG: 250 TABLET ORAL at 05:05

## 2021-05-07 RX ADMIN — PREDNISONE 5 MG: 5 TABLET ORAL at 08:05

## 2021-05-07 RX ADMIN — INSULIN ASPART 10 UNITS: 100 INJECTION, SOLUTION INTRAVENOUS; SUBCUTANEOUS at 12:05

## 2021-05-07 RX ADMIN — SEVELAMER CARBONATE 1600 MG: 800 TABLET, FILM COATED ORAL at 12:05

## 2021-05-07 RX ADMIN — ACETAMINOPHEN 1000 MG: 500 TABLET, FILM COATED ORAL at 05:05

## 2021-05-07 RX ADMIN — MAGNESIUM 64 MG (MAGNESIUM CHLORIDE) TABLET,DELAYED RELEASE 128 MG: at 12:05

## 2021-05-07 RX ADMIN — INSULIN ASPART 3 UNITS: 100 INJECTION, SOLUTION INTRAVENOUS; SUBCUTANEOUS at 05:05

## 2021-05-08 LAB
ALBUMIN SERPL BCP-MCNC: 3.1 G/DL (ref 3.5–5.2)
ALP SERPL-CCNC: 71 U/L (ref 55–135)
ALT SERPL W/O P-5'-P-CCNC: 152 U/L (ref 10–44)
ANION GAP SERPL CALC-SCNC: 14 MMOL/L (ref 8–16)
AST SERPL-CCNC: 33 U/L (ref 10–40)
BASOPHILS # BLD AUTO: 0.03 K/UL (ref 0–0.2)
BASOPHILS NFR BLD: 0.3 % (ref 0–1.9)
BILIRUB SERPL-MCNC: 0.5 MG/DL (ref 0.1–1)
BUN SERPL-MCNC: 103 MG/DL (ref 6–20)
CALCIUM SERPL-MCNC: 8 MG/DL (ref 8.7–10.5)
CHLORIDE SERPL-SCNC: 100 MMOL/L (ref 95–110)
CO2 SERPL-SCNC: 22 MMOL/L (ref 23–29)
CREAT SERPL-MCNC: 10.5 MG/DL (ref 0.5–1.4)
DIFFERENTIAL METHOD: ABNORMAL
EOSINOPHIL # BLD AUTO: 0.3 K/UL (ref 0–0.5)
EOSINOPHIL NFR BLD: 2.9 % (ref 0–8)
ERYTHROCYTE [DISTWIDTH] IN BLOOD BY AUTOMATED COUNT: 12.7 % (ref 11.5–14.5)
EST. GFR  (AFRICAN AMERICAN): 5.6 ML/MIN/1.73 M^2
EST. GFR  (NON AFRICAN AMERICAN): 4.8 ML/MIN/1.73 M^2
GLUCOSE SERPL-MCNC: 93 MG/DL (ref 70–110)
HCT VFR BLD AUTO: 22.9 % (ref 40–54)
HGB BLD-MCNC: 7.3 G/DL (ref 14–18)
IMM GRANULOCYTES # BLD AUTO: 0.36 K/UL (ref 0–0.04)
IMM GRANULOCYTES NFR BLD AUTO: 3.5 % (ref 0–0.5)
LYMPHOCYTES # BLD AUTO: 1.2 K/UL (ref 1–4.8)
LYMPHOCYTES NFR BLD: 12 % (ref 18–48)
MAGNESIUM SERPL-MCNC: 2.2 MG/DL (ref 1.6–2.6)
MCH RBC QN AUTO: 28.4 PG (ref 27–31)
MCHC RBC AUTO-ENTMCNC: 31.9 G/DL (ref 32–36)
MCV RBC AUTO: 89 FL (ref 82–98)
MONOCYTES # BLD AUTO: 1.1 K/UL (ref 0.3–1)
MONOCYTES NFR BLD: 10.7 % (ref 4–15)
NEUTROPHILS # BLD AUTO: 7.2 K/UL (ref 1.8–7.7)
NEUTROPHILS NFR BLD: 70.6 % (ref 38–73)
NRBC BLD-RTO: 0 /100 WBC
PATH REPORT.FINAL DX SPEC: NORMAL
PLATELET # BLD AUTO: 275 K/UL (ref 150–450)
PMV BLD AUTO: 9.3 FL (ref 9.2–12.9)
POCT GLUCOSE: 194 MG/DL (ref 70–110)
POCT GLUCOSE: 81 MG/DL (ref 70–110)
POCT GLUCOSE: 93 MG/DL (ref 70–110)
POTASSIUM SERPL-SCNC: 4.5 MMOL/L (ref 3.5–5.1)
PROT SERPL-MCNC: 5.9 G/DL (ref 6–8.4)
RBC # BLD AUTO: 2.57 M/UL (ref 4.6–6.2)
RITUXAN SENSITIVITY (CD20): NORMAL
SODIUM SERPL-SCNC: 136 MMOL/L (ref 136–145)
TACROLIMUS BLD-MCNC: 25.8 NG/ML (ref 5–15)
WBC # BLD AUTO: 10.2 K/UL (ref 3.9–12.7)

## 2021-05-08 PROCEDURE — 90935 HEMODIALYSIS ONE EVALUATION: CPT | Mod: ,,, | Performed by: NURSE PRACTITIONER

## 2021-05-08 PROCEDURE — 99232 SBSQ HOSP IP/OBS MODERATE 35: CPT | Mod: ,,, | Performed by: INTERNAL MEDICINE

## 2021-05-08 PROCEDURE — 63600175 PHARM REV CODE 636 W HCPCS: Mod: TB | Performed by: NURSE PRACTITIONER

## 2021-05-08 PROCEDURE — 85025 COMPLETE CBC W/AUTO DIFF WBC: CPT | Performed by: INTERNAL MEDICINE

## 2021-05-08 PROCEDURE — 90935 HEMODIALYSIS ONE EVALUATION: CPT

## 2021-05-08 PROCEDURE — 90935 PR HEMODIALYSIS, ONE EVALUATION: ICD-10-PCS | Mod: ,,, | Performed by: NURSE PRACTITIONER

## 2021-05-08 PROCEDURE — 63600175 PHARM REV CODE 636 W HCPCS: Performed by: NURSE PRACTITIONER

## 2021-05-08 PROCEDURE — 83735 ASSAY OF MAGNESIUM: CPT | Performed by: INTERNAL MEDICINE

## 2021-05-08 PROCEDURE — 25000003 PHARM REV CODE 250: Performed by: PHYSICIAN ASSISTANT

## 2021-05-08 PROCEDURE — 80053 COMPREHEN METABOLIC PANEL: CPT | Performed by: INTERNAL MEDICINE

## 2021-05-08 PROCEDURE — 99232 PR SUBSEQUENT HOSPITAL CARE,LEVL II: ICD-10-PCS | Mod: ,,, | Performed by: NURSE PRACTITIONER

## 2021-05-08 PROCEDURE — 25000003 PHARM REV CODE 250: Performed by: NURSE PRACTITIONER

## 2021-05-08 PROCEDURE — 63600175 PHARM REV CODE 636 W HCPCS: Performed by: PHYSICIAN ASSISTANT

## 2021-05-08 PROCEDURE — 99232 SBSQ HOSP IP/OBS MODERATE 35: CPT | Mod: ,,, | Performed by: NURSE PRACTITIONER

## 2021-05-08 PROCEDURE — 20600001 HC STEP DOWN PRIVATE ROOM

## 2021-05-08 PROCEDURE — 80197 ASSAY OF TACROLIMUS: CPT | Performed by: PHYSICIAN ASSISTANT

## 2021-05-08 PROCEDURE — 25000003 PHARM REV CODE 250: Performed by: INTERNAL MEDICINE

## 2021-05-08 PROCEDURE — 25000003 PHARM REV CODE 250: Performed by: GENERAL PRACTICE

## 2021-05-08 PROCEDURE — 25000003 PHARM REV CODE 250

## 2021-05-08 PROCEDURE — 63600175 PHARM REV CODE 636 W HCPCS: Performed by: STUDENT IN AN ORGANIZED HEALTH CARE EDUCATION/TRAINING PROGRAM

## 2021-05-08 PROCEDURE — 63600175 PHARM REV CODE 636 W HCPCS: Performed by: INTERNAL MEDICINE

## 2021-05-08 PROCEDURE — 99232 PR SUBSEQUENT HOSPITAL CARE,LEVL II: ICD-10-PCS | Mod: ,,, | Performed by: INTERNAL MEDICINE

## 2021-05-08 PROCEDURE — 36415 COLL VENOUS BLD VENIPUNCTURE: CPT | Performed by: INTERNAL MEDICINE

## 2021-05-08 RX ORDER — HEPARIN SODIUM 1000 [USP'U]/ML
1000 INJECTION, SOLUTION INTRAVENOUS; SUBCUTANEOUS ONCE
Status: COMPLETED | OUTPATIENT
Start: 2021-05-08 | End: 2021-05-08

## 2021-05-08 RX ORDER — HEPARIN SODIUM 1000 [USP'U]/ML
1000 INJECTION, SOLUTION INTRAVENOUS; SUBCUTANEOUS
Status: DISCONTINUED | OUTPATIENT
Start: 2021-05-08 | End: 2021-05-11 | Stop reason: HOSPADM

## 2021-05-08 RX ADMIN — CLONIDINE HYDROCHLORIDE 0.2 MG: 0.1 TABLET ORAL at 02:05

## 2021-05-08 RX ADMIN — ASPIRIN 81 MG: 81 TABLET, COATED ORAL at 08:05

## 2021-05-08 RX ADMIN — SODIUM BICARBONATE 650 MG TABLET 1300 MG: at 08:05

## 2021-05-08 RX ADMIN — CLONIDINE HYDROCHLORIDE 0.2 MG: 0.1 TABLET ORAL at 08:05

## 2021-05-08 RX ADMIN — EPOETIN ALFA-EPBX 5600 UNITS: 10000 INJECTION, SOLUTION INTRAVENOUS; SUBCUTANEOUS at 12:05

## 2021-05-08 RX ADMIN — INSULIN ASPART 10 UNITS: 100 INJECTION, SOLUTION INTRAVENOUS; SUBCUTANEOUS at 05:05

## 2021-05-08 RX ADMIN — HEPARIN SODIUM 1000 UNITS: 1000 INJECTION, SOLUTION INTRAVENOUS; SUBCUTANEOUS at 12:05

## 2021-05-08 RX ADMIN — SODIUM BICARBONATE 650 MG TABLET 1300 MG: at 09:05

## 2021-05-08 RX ADMIN — FAMOTIDINE 20 MG: 20 TABLET ORAL at 08:05

## 2021-05-08 RX ADMIN — NIFEDIPINE 60 MG: 30 TABLET, FILM COATED, EXTENDED RELEASE ORAL at 08:05

## 2021-05-08 RX ADMIN — CLONIDINE HYDROCHLORIDE 0.2 MG: 0.1 TABLET ORAL at 09:05

## 2021-05-08 RX ADMIN — SEVELAMER CARBONATE 1600 MG: 800 TABLET, FILM COATED ORAL at 12:05

## 2021-05-08 RX ADMIN — ACETAMINOPHEN 1000 MG: 500 TABLET, FILM COATED ORAL at 06:05

## 2021-05-08 RX ADMIN — MAGNESIUM 64 MG (MAGNESIUM CHLORIDE) TABLET,DELAYED RELEASE 128 MG: at 09:05

## 2021-05-08 RX ADMIN — MAGNESIUM 64 MG (MAGNESIUM CHLORIDE) TABLET,DELAYED RELEASE 128 MG: at 08:05

## 2021-05-08 RX ADMIN — INSULIN ASPART 1 UNITS: 100 INJECTION, SOLUTION INTRAVENOUS; SUBCUTANEOUS at 09:05

## 2021-05-08 RX ADMIN — SEVELAMER CARBONATE 1600 MG: 800 TABLET, FILM COATED ORAL at 05:05

## 2021-05-08 RX ADMIN — ACETAMINOPHEN 1000 MG: 500 TABLET, FILM COATED ORAL at 03:05

## 2021-05-08 RX ADMIN — INSULIN ASPART 10 UNITS: 100 INJECTION, SOLUTION INTRAVENOUS; SUBCUTANEOUS at 08:05

## 2021-05-08 RX ADMIN — TACROLIMUS 2 MG: 1 CAPSULE ORAL at 06:05

## 2021-05-08 RX ADMIN — PREDNISONE 5 MG: 5 TABLET ORAL at 08:05

## 2021-05-08 RX ADMIN — SEVELAMER CARBONATE 1600 MG: 800 TABLET, FILM COATED ORAL at 08:05

## 2021-05-08 RX ADMIN — SODIUM CHLORIDE: 0.9 INJECTION, SOLUTION INTRAVENOUS at 09:05

## 2021-05-08 RX ADMIN — INSULIN ASPART 10 UNITS: 100 INJECTION, SOLUTION INTRAVENOUS; SUBCUTANEOUS at 12:05

## 2021-05-08 RX ADMIN — TACROLIMUS 2 MG: 1 CAPSULE ORAL at 05:05

## 2021-05-09 ENCOUNTER — PATIENT MESSAGE (OUTPATIENT)
Dept: TRANSPLANT | Facility: CLINIC | Age: 61
End: 2021-05-09

## 2021-05-09 PROBLEM — N18.5 CKD (CHRONIC KIDNEY DISEASE) STAGE 5, GFR LESS THAN 15 ML/MIN: Status: ACTIVE | Noted: 2020-06-28

## 2021-05-09 LAB
ALBUMIN SERPL BCP-MCNC: 3 G/DL (ref 3.5–5.2)
ALP SERPL-CCNC: 76 U/L (ref 55–135)
ALT SERPL W/O P-5'-P-CCNC: 117 U/L (ref 10–44)
ANION GAP SERPL CALC-SCNC: 11 MMOL/L (ref 8–16)
AST SERPL-CCNC: 30 U/L (ref 10–40)
BASOPHILS # BLD AUTO: 0.02 K/UL (ref 0–0.2)
BASOPHILS NFR BLD: 0.2 % (ref 0–1.9)
BILIRUB SERPL-MCNC: 0.5 MG/DL (ref 0.1–1)
BUN SERPL-MCNC: 55 MG/DL (ref 6–20)
CALCIUM SERPL-MCNC: 8.4 MG/DL (ref 8.7–10.5)
CHLORIDE SERPL-SCNC: 104 MMOL/L (ref 95–110)
CO2 SERPL-SCNC: 23 MMOL/L (ref 23–29)
CREAT SERPL-MCNC: 7.4 MG/DL (ref 0.5–1.4)
DIFFERENTIAL METHOD: ABNORMAL
EOSINOPHIL # BLD AUTO: 0.3 K/UL (ref 0–0.5)
EOSINOPHIL NFR BLD: 2.5 % (ref 0–8)
ERYTHROCYTE [DISTWIDTH] IN BLOOD BY AUTOMATED COUNT: 12.6 % (ref 11.5–14.5)
EST. GFR  (AFRICAN AMERICAN): 8.5 ML/MIN/1.73 M^2
EST. GFR  (NON AFRICAN AMERICAN): 7.3 ML/MIN/1.73 M^2
GLUCOSE SERPL-MCNC: 162 MG/DL (ref 70–110)
HCT VFR BLD AUTO: 23.1 % (ref 40–54)
HGB BLD-MCNC: 7.1 G/DL (ref 14–18)
IMM GRANULOCYTES # BLD AUTO: 0.52 K/UL (ref 0–0.04)
IMM GRANULOCYTES NFR BLD AUTO: 4.6 % (ref 0–0.5)
LYMPHOCYTES # BLD AUTO: 1.6 K/UL (ref 1–4.8)
LYMPHOCYTES NFR BLD: 14.5 % (ref 18–48)
MAGNESIUM SERPL-MCNC: 2.1 MG/DL (ref 1.6–2.6)
MCH RBC QN AUTO: 28.5 PG (ref 27–31)
MCHC RBC AUTO-ENTMCNC: 30.7 G/DL (ref 32–36)
MCV RBC AUTO: 93 FL (ref 82–98)
MONOCYTES # BLD AUTO: 1.2 K/UL (ref 0.3–1)
MONOCYTES NFR BLD: 10.5 % (ref 4–15)
NEUTROPHILS # BLD AUTO: 7.7 K/UL (ref 1.8–7.7)
NEUTROPHILS NFR BLD: 67.7 % (ref 38–73)
NRBC BLD-RTO: 0 /100 WBC
PLATELET # BLD AUTO: 273 K/UL (ref 150–450)
PMV BLD AUTO: 9.3 FL (ref 9.2–12.9)
POCT GLUCOSE: 165 MG/DL (ref 70–110)
POCT GLUCOSE: 171 MG/DL (ref 70–110)
POCT GLUCOSE: 173 MG/DL (ref 70–110)
POCT GLUCOSE: 209 MG/DL (ref 70–110)
POCT GLUCOSE: 210 MG/DL (ref 70–110)
POTASSIUM SERPL-SCNC: 4.5 MMOL/L (ref 3.5–5.1)
PROT SERPL-MCNC: 5.7 G/DL (ref 6–8.4)
RBC # BLD AUTO: 2.49 M/UL (ref 4.6–6.2)
SODIUM SERPL-SCNC: 138 MMOL/L (ref 136–145)
TACROLIMUS BLD-MCNC: 5.1 NG/ML (ref 5–15)
WBC # BLD AUTO: 11.28 K/UL (ref 3.9–12.7)

## 2021-05-09 PROCEDURE — 83735 ASSAY OF MAGNESIUM: CPT | Performed by: INTERNAL MEDICINE

## 2021-05-09 PROCEDURE — 80053 COMPREHEN METABOLIC PANEL: CPT | Performed by: INTERNAL MEDICINE

## 2021-05-09 PROCEDURE — 80197 ASSAY OF TACROLIMUS: CPT | Performed by: PHYSICIAN ASSISTANT

## 2021-05-09 PROCEDURE — 85025 COMPLETE CBC W/AUTO DIFF WBC: CPT | Performed by: INTERNAL MEDICINE

## 2021-05-09 PROCEDURE — 25000003 PHARM REV CODE 250: Performed by: NURSE PRACTITIONER

## 2021-05-09 PROCEDURE — 20600001 HC STEP DOWN PRIVATE ROOM

## 2021-05-09 PROCEDURE — 25000003 PHARM REV CODE 250: Performed by: INTERNAL MEDICINE

## 2021-05-09 PROCEDURE — 25000003 PHARM REV CODE 250: Performed by: PHYSICIAN ASSISTANT

## 2021-05-09 PROCEDURE — 63600175 PHARM REV CODE 636 W HCPCS: Performed by: PHYSICIAN ASSISTANT

## 2021-05-09 PROCEDURE — 36415 COLL VENOUS BLD VENIPUNCTURE: CPT | Performed by: INTERNAL MEDICINE

## 2021-05-09 PROCEDURE — 25000003 PHARM REV CODE 250

## 2021-05-09 PROCEDURE — 99232 SBSQ HOSP IP/OBS MODERATE 35: CPT | Mod: ,,, | Performed by: INTERNAL MEDICINE

## 2021-05-09 PROCEDURE — 99232 PR SUBSEQUENT HOSPITAL CARE,LEVL II: ICD-10-PCS | Mod: ,,, | Performed by: INTERNAL MEDICINE

## 2021-05-09 PROCEDURE — 63600175 PHARM REV CODE 636 W HCPCS: Performed by: INTERNAL MEDICINE

## 2021-05-09 RX ADMIN — NIFEDIPINE 60 MG: 30 TABLET, FILM COATED, EXTENDED RELEASE ORAL at 08:05

## 2021-05-09 RX ADMIN — CLONIDINE HYDROCHLORIDE 0.2 MG: 0.1 TABLET ORAL at 06:05

## 2021-05-09 RX ADMIN — CLONIDINE HYDROCHLORIDE 0.2 MG: 0.1 TABLET ORAL at 09:05

## 2021-05-09 RX ADMIN — TACROLIMUS 2 MG: 1 CAPSULE ORAL at 05:05

## 2021-05-09 RX ADMIN — MAGNESIUM 64 MG (MAGNESIUM CHLORIDE) TABLET,DELAYED RELEASE 128 MG: at 08:05

## 2021-05-09 RX ADMIN — FAMOTIDINE 20 MG: 20 TABLET ORAL at 08:05

## 2021-05-09 RX ADMIN — SEVELAMER CARBONATE 1600 MG: 800 TABLET, FILM COATED ORAL at 08:05

## 2021-05-09 RX ADMIN — ACETAMINOPHEN 650 MG: 325 TABLET ORAL at 06:05

## 2021-05-09 RX ADMIN — INSULIN ASPART 10 UNITS: 100 INJECTION, SOLUTION INTRAVENOUS; SUBCUTANEOUS at 04:05

## 2021-05-09 RX ADMIN — TACROLIMUS 2 MG: 1 CAPSULE ORAL at 08:05

## 2021-05-09 RX ADMIN — SODIUM BICARBONATE 650 MG TABLET 1300 MG: at 09:05

## 2021-05-09 RX ADMIN — PREDNISONE 5 MG: 5 TABLET ORAL at 08:05

## 2021-05-09 RX ADMIN — INSULIN ASPART 10 UNITS: 100 INJECTION, SOLUTION INTRAVENOUS; SUBCUTANEOUS at 08:05

## 2021-05-09 RX ADMIN — SODIUM BICARBONATE 650 MG TABLET 1300 MG: at 08:05

## 2021-05-09 RX ADMIN — INSULIN ASPART 2 UNITS: 100 INJECTION, SOLUTION INTRAVENOUS; SUBCUTANEOUS at 04:05

## 2021-05-09 RX ADMIN — INSULIN ASPART 10 UNITS: 100 INJECTION, SOLUTION INTRAVENOUS; SUBCUTANEOUS at 11:05

## 2021-05-09 RX ADMIN — SEVELAMER CARBONATE 1600 MG: 800 TABLET, FILM COATED ORAL at 04:05

## 2021-05-09 RX ADMIN — ASPIRIN 81 MG: 81 TABLET, COATED ORAL at 08:05

## 2021-05-09 RX ADMIN — MAGNESIUM 64 MG (MAGNESIUM CHLORIDE) TABLET,DELAYED RELEASE 128 MG: at 09:05

## 2021-05-09 RX ADMIN — SEVELAMER CARBONATE 1600 MG: 800 TABLET, FILM COATED ORAL at 11:05

## 2021-05-09 RX ADMIN — CLONIDINE HYDROCHLORIDE 0.2 MG: 0.1 TABLET ORAL at 01:05

## 2021-05-10 LAB
ALBUMIN SERPL BCP-MCNC: 3.2 G/DL (ref 3.5–5.2)
ALP SERPL-CCNC: 83 U/L (ref 55–135)
ALT SERPL W/O P-5'-P-CCNC: 85 U/L (ref 10–44)
ANION GAP SERPL CALC-SCNC: 9 MMOL/L (ref 8–16)
AST SERPL-CCNC: 22 U/L (ref 10–40)
BASOPHILS # BLD AUTO: 0.06 K/UL (ref 0–0.2)
BASOPHILS NFR BLD: 0.4 % (ref 0–1.9)
BILIRUB SERPL-MCNC: 0.5 MG/DL (ref 0.1–1)
BUN SERPL-MCNC: 66 MG/DL (ref 6–20)
CALCIUM SERPL-MCNC: 8 MG/DL (ref 8.7–10.5)
CHLORIDE SERPL-SCNC: 104 MMOL/L (ref 95–110)
CO2 SERPL-SCNC: 24 MMOL/L (ref 23–29)
CREAT SERPL-MCNC: 8.4 MG/DL (ref 0.5–1.4)
DIFFERENTIAL METHOD: ABNORMAL
EOSINOPHIL # BLD AUTO: 0.3 K/UL (ref 0–0.5)
EOSINOPHIL NFR BLD: 2.2 % (ref 0–8)
ERYTHROCYTE [DISTWIDTH] IN BLOOD BY AUTOMATED COUNT: 13 % (ref 11.5–14.5)
EST. GFR  (AFRICAN AMERICAN): 7.3 ML/MIN/1.73 M^2
EST. GFR  (NON AFRICAN AMERICAN): 6.3 ML/MIN/1.73 M^2
GLUCOSE SERPL-MCNC: 165 MG/DL (ref 70–110)
HCT VFR BLD AUTO: 23 % (ref 40–54)
HGB BLD-MCNC: 7 G/DL (ref 14–18)
IMM GRANULOCYTES # BLD AUTO: 0.44 K/UL (ref 0–0.04)
IMM GRANULOCYTES NFR BLD AUTO: 3.3 % (ref 0–0.5)
LYMPHOCYTES # BLD AUTO: 1.9 K/UL (ref 1–4.8)
LYMPHOCYTES NFR BLD: 13.7 % (ref 18–48)
MAGNESIUM SERPL-MCNC: 2.2 MG/DL (ref 1.6–2.6)
MCH RBC QN AUTO: 28.1 PG (ref 27–31)
MCHC RBC AUTO-ENTMCNC: 30.4 G/DL (ref 32–36)
MCV RBC AUTO: 92 FL (ref 82–98)
MONOCYTES # BLD AUTO: 1.3 K/UL (ref 0.3–1)
MONOCYTES NFR BLD: 9.7 % (ref 4–15)
NEUTROPHILS # BLD AUTO: 9.6 K/UL (ref 1.8–7.7)
NEUTROPHILS NFR BLD: 70.7 % (ref 38–73)
NRBC BLD-RTO: 0 /100 WBC
PLATELET # BLD AUTO: 282 K/UL (ref 150–450)
PMV BLD AUTO: 9.1 FL (ref 9.2–12.9)
POCT GLUCOSE: 125 MG/DL (ref 70–110)
POCT GLUCOSE: 125 MG/DL (ref 70–110)
POCT GLUCOSE: 148 MG/DL (ref 70–110)
POCT GLUCOSE: 215 MG/DL (ref 70–110)
POTASSIUM SERPL-SCNC: 4.6 MMOL/L (ref 3.5–5.1)
PROT SERPL-MCNC: 6 G/DL (ref 6–8.4)
RBC # BLD AUTO: 2.49 M/UL (ref 4.6–6.2)
SODIUM SERPL-SCNC: 137 MMOL/L (ref 136–145)
TACROLIMUS BLD-MCNC: 4.4 NG/ML (ref 5–15)
WBC # BLD AUTO: 13.53 K/UL (ref 3.9–12.7)

## 2021-05-10 PROCEDURE — 63700000 PHARM REV CODE 250 ALT 637 W/O HCPCS: Performed by: PHYSICIAN ASSISTANT

## 2021-05-10 PROCEDURE — 63600175 PHARM REV CODE 636 W HCPCS: Performed by: INTERNAL MEDICINE

## 2021-05-10 PROCEDURE — 80197 ASSAY OF TACROLIMUS: CPT | Performed by: PHYSICIAN ASSISTANT

## 2021-05-10 PROCEDURE — 63600175 PHARM REV CODE 636 W HCPCS: Mod: JG | Performed by: INTERNAL MEDICINE

## 2021-05-10 PROCEDURE — 63600175 PHARM REV CODE 636 W HCPCS: Performed by: PHYSICIAN ASSISTANT

## 2021-05-10 PROCEDURE — 25000003 PHARM REV CODE 250: Performed by: NURSE PRACTITIONER

## 2021-05-10 PROCEDURE — 83735 ASSAY OF MAGNESIUM: CPT | Performed by: INTERNAL MEDICINE

## 2021-05-10 PROCEDURE — 25000003 PHARM REV CODE 250: Performed by: PHYSICIAN ASSISTANT

## 2021-05-10 PROCEDURE — 99233 SBSQ HOSP IP/OBS HIGH 50: CPT | Mod: ,,, | Performed by: NURSE PRACTITIONER

## 2021-05-10 PROCEDURE — 85025 COMPLETE CBC W/AUTO DIFF WBC: CPT | Performed by: INTERNAL MEDICINE

## 2021-05-10 PROCEDURE — 99233 PR SUBSEQUENT HOSPITAL CARE,LEVL III: ICD-10-PCS | Mod: ,,, | Performed by: NURSE PRACTITIONER

## 2021-05-10 PROCEDURE — 99232 PR SUBSEQUENT HOSPITAL CARE,LEVL II: ICD-10-PCS | Mod: ,,, | Performed by: PHYSICIAN ASSISTANT

## 2021-05-10 PROCEDURE — 25000003 PHARM REV CODE 250

## 2021-05-10 PROCEDURE — 99232 SBSQ HOSP IP/OBS MODERATE 35: CPT | Mod: ,,, | Performed by: PHYSICIAN ASSISTANT

## 2021-05-10 PROCEDURE — 36415 COLL VENOUS BLD VENIPUNCTURE: CPT | Performed by: INTERNAL MEDICINE

## 2021-05-10 PROCEDURE — 80053 COMPREHEN METABOLIC PANEL: CPT | Performed by: INTERNAL MEDICINE

## 2021-05-10 PROCEDURE — 25000003 PHARM REV CODE 250: Performed by: INTERNAL MEDICINE

## 2021-05-10 PROCEDURE — 20600001 HC STEP DOWN PRIVATE ROOM

## 2021-05-10 RX ORDER — HEPARIN 100 UNIT/ML
500 SYRINGE INTRAVENOUS
Status: DISCONTINUED | OUTPATIENT
Start: 2021-05-10 | End: 2021-05-11 | Stop reason: HOSPADM

## 2021-05-10 RX ORDER — SODIUM CHLORIDE 0.9 % (FLUSH) 0.9 %
10 SYRINGE (ML) INJECTION
Status: DISCONTINUED | OUTPATIENT
Start: 2021-05-10 | End: 2021-05-11 | Stop reason: HOSPADM

## 2021-05-10 RX ADMIN — NIFEDIPINE 60 MG: 30 TABLET, FILM COATED, EXTENDED RELEASE ORAL at 10:05

## 2021-05-10 RX ADMIN — SEVELAMER CARBONATE 1600 MG: 800 TABLET, FILM COATED ORAL at 06:05

## 2021-05-10 RX ADMIN — MAGNESIUM 64 MG (MAGNESIUM CHLORIDE) TABLET,DELAYED RELEASE 128 MG: at 08:05

## 2021-05-10 RX ADMIN — FAMOTIDINE 20 MG: 20 TABLET ORAL at 10:05

## 2021-05-10 RX ADMIN — CLONIDINE HYDROCHLORIDE 0.2 MG: 0.1 TABLET ORAL at 04:05

## 2021-05-10 RX ADMIN — SULFAMETHOXAZOLE AND TRIMETHOPRIM 1 TABLET: 400; 80 TABLET ORAL at 10:05

## 2021-05-10 RX ADMIN — TACROLIMUS 2 MG: 1 CAPSULE ORAL at 10:05

## 2021-05-10 RX ADMIN — ASPIRIN 81 MG: 81 TABLET, COATED ORAL at 10:05

## 2021-05-10 RX ADMIN — CLONIDINE HYDROCHLORIDE 0.2 MG: 0.1 TABLET ORAL at 10:05

## 2021-05-10 RX ADMIN — ACETAMINOPHEN 650 MG: 325 TABLET ORAL at 04:05

## 2021-05-10 RX ADMIN — SODIUM BICARBONATE 650 MG TABLET 1300 MG: at 08:05

## 2021-05-10 RX ADMIN — AZITHROMYCIN MONOHYDRATE 250 MG: 250 TABLET ORAL at 04:05

## 2021-05-10 RX ADMIN — TACROLIMUS 2 MG: 1 CAPSULE ORAL at 06:05

## 2021-05-10 RX ADMIN — RITUXIMAB 889 MG: 10 INJECTION, SOLUTION INTRAVENOUS at 05:05

## 2021-05-10 RX ADMIN — SODIUM BICARBONATE 650 MG TABLET 1300 MG: at 10:05

## 2021-05-10 RX ADMIN — INSULIN ASPART 10 UNITS: 100 INJECTION, SOLUTION INTRAVENOUS; SUBCUTANEOUS at 10:05

## 2021-05-10 RX ADMIN — SEVELAMER CARBONATE 1600 MG: 800 TABLET, FILM COATED ORAL at 01:05

## 2021-05-10 RX ADMIN — ONDANSETRON 8 MG: 8 TABLET, ORALLY DISINTEGRATING ORAL at 04:05

## 2021-05-10 RX ADMIN — METHYLPREDNISOLONE SODIUM SUCCINATE 125 MG: 125 INJECTION, POWDER, FOR SOLUTION INTRAMUSCULAR; INTRAVENOUS at 04:05

## 2021-05-10 RX ADMIN — MAGNESIUM 64 MG (MAGNESIUM CHLORIDE) TABLET,DELAYED RELEASE 128 MG: at 10:05

## 2021-05-10 RX ADMIN — INSULIN ASPART 10 UNITS: 100 INJECTION, SOLUTION INTRAVENOUS; SUBCUTANEOUS at 01:05

## 2021-05-10 RX ADMIN — CLONIDINE HYDROCHLORIDE 0.2 MG: 0.1 TABLET ORAL at 08:05

## 2021-05-10 RX ADMIN — PREDNISONE 5 MG: 5 TABLET ORAL at 10:05

## 2021-05-10 RX ADMIN — DIPHENHYDRAMINE HYDROCHLORIDE 50 MG: 50 CAPSULE ORAL at 04:05

## 2021-05-10 RX ADMIN — INSULIN ASPART 1 UNITS: 100 INJECTION, SOLUTION INTRAVENOUS; SUBCUTANEOUS at 08:05

## 2021-05-11 ENCOUNTER — TELEPHONE (OUTPATIENT)
Dept: TRANSPLANT | Facility: HOSPITAL | Age: 61
End: 2021-05-11

## 2021-05-11 VITALS
WEIGHT: 247.13 LBS | BODY MASS INDEX: 34.6 KG/M2 | HEIGHT: 71 IN | SYSTOLIC BLOOD PRESSURE: 136 MMHG | DIASTOLIC BLOOD PRESSURE: 77 MMHG | OXYGEN SATURATION: 90 % | HEART RATE: 84 BPM | TEMPERATURE: 98 F | RESPIRATION RATE: 17 BRPM

## 2021-05-11 LAB
POCT GLUCOSE: 152 MG/DL (ref 70–110)
POCT GLUCOSE: 299 MG/DL (ref 70–110)

## 2021-05-11 PROCEDURE — 63600175 PHARM REV CODE 636 W HCPCS: Performed by: INTERNAL MEDICINE

## 2021-05-11 PROCEDURE — 99238 PR HOSPITAL DISCHARGE DAY,<30 MIN: ICD-10-PCS | Mod: ,,, | Performed by: NURSE PRACTITIONER

## 2021-05-11 PROCEDURE — 63600175 PHARM REV CODE 636 W HCPCS: Performed by: STUDENT IN AN ORGANIZED HEALTH CARE EDUCATION/TRAINING PROGRAM

## 2021-05-11 PROCEDURE — 25000003 PHARM REV CODE 250: Performed by: INTERNAL MEDICINE

## 2021-05-11 PROCEDURE — 80100014 HC HEMODIALYSIS 1:1

## 2021-05-11 PROCEDURE — 63600175 PHARM REV CODE 636 W HCPCS: Performed by: PHYSICIAN ASSISTANT

## 2021-05-11 PROCEDURE — 1111F PR DISCHARGE MEDS RECONCILED W/ CURRENT OUTPATIENT MED LIST: ICD-10-PCS | Mod: CPTII,,, | Performed by: NURSE PRACTITIONER

## 2021-05-11 PROCEDURE — 1111F DSCHRG MED/CURRENT MED MERGE: CPT | Mod: CPTII,,, | Performed by: NURSE PRACTITIONER

## 2021-05-11 PROCEDURE — 99232 PR SUBSEQUENT HOSPITAL CARE,LEVL II: ICD-10-PCS | Mod: ,,, | Performed by: NURSE PRACTITIONER

## 2021-05-11 PROCEDURE — 99232 SBSQ HOSP IP/OBS MODERATE 35: CPT | Mod: ,,, | Performed by: NURSE PRACTITIONER

## 2021-05-11 PROCEDURE — 99238 HOSP IP/OBS DSCHRG MGMT 30/<: CPT | Mod: ,,, | Performed by: NURSE PRACTITIONER

## 2021-05-11 PROCEDURE — 25000003 PHARM REV CODE 250

## 2021-05-11 PROCEDURE — 25000003 PHARM REV CODE 250: Performed by: NURSE PRACTITIONER

## 2021-05-11 PROCEDURE — 25000003 PHARM REV CODE 250: Performed by: PHYSICIAN ASSISTANT

## 2021-05-11 RX ORDER — INSULIN DEGLUDEC 200 U/ML
20 INJECTION, SOLUTION SUBCUTANEOUS NIGHTLY
Qty: 15 ML | Refills: 11 | Status: SHIPPED | OUTPATIENT
Start: 2021-05-11 | End: 2021-09-14 | Stop reason: SDUPTHER

## 2021-05-11 RX ORDER — FAMOTIDINE 20 MG/1
20 TABLET, FILM COATED ORAL DAILY
Qty: 180 TABLET | Refills: 4
Start: 2021-05-11 | End: 2021-11-08

## 2021-05-11 RX ORDER — SEVELAMER CARBONATE 800 MG/1
1600 TABLET, FILM COATED ORAL
Qty: 180 TABLET | Refills: 11 | Status: SHIPPED | OUTPATIENT
Start: 2021-05-11 | End: 2021-07-11 | Stop reason: SDUPTHER

## 2021-05-11 RX ORDER — INSULIN ASPART 100 [IU]/ML
0-5 INJECTION, SOLUTION INTRAVENOUS; SUBCUTANEOUS
Refills: 0
Start: 2021-05-11 | End: 2022-05-11

## 2021-05-11 RX ORDER — IMMUNE GLOBULIN (HUMAN) 10 G/100ML
60 INJECTION INTRAVENOUS; SUBCUTANEOUS
Start: 2021-05-11 | End: 2021-11-08 | Stop reason: ALTCHOICE

## 2021-05-11 RX ORDER — NIFEDIPINE 30 MG/1
60 TABLET, EXTENDED RELEASE ORAL DAILY
Qty: 60 TABLET | Refills: 11 | Status: SHIPPED | OUTPATIENT
Start: 2021-05-11 | End: 2021-06-02 | Stop reason: ALTCHOICE

## 2021-05-11 RX ORDER — HEPARIN SODIUM 5000 [USP'U]/ML
5000 INJECTION, SOLUTION INTRAVENOUS; SUBCUTANEOUS EVERY 8 HOURS
Status: DISCONTINUED | OUTPATIENT
Start: 2021-05-11 | End: 2021-05-11

## 2021-05-11 RX ORDER — TACROLIMUS 0.5 MG/1
CAPSULE ORAL
Qty: 210 CAPSULE | Refills: 11 | Status: SHIPPED | OUTPATIENT
Start: 2021-05-11 | End: 2021-07-07

## 2021-05-11 RX ORDER — HEPARIN SODIUM 5000 [USP'U]/ML
5000 INJECTION, SOLUTION INTRAVENOUS; SUBCUTANEOUS EVERY 12 HOURS
Status: DISCONTINUED | OUTPATIENT
Start: 2021-05-11 | End: 2021-05-11 | Stop reason: HOSPADM

## 2021-05-11 RX ORDER — CLONIDINE HYDROCHLORIDE 0.1 MG/1
0.1 TABLET ORAL 3 TIMES DAILY
Qty: 90 TABLET | Refills: 11 | Status: SHIPPED | OUTPATIENT
Start: 2021-05-11 | End: 2022-06-24 | Stop reason: SDUPTHER

## 2021-05-11 RX ORDER — INSULIN ASPART 100 [IU]/ML
10 INJECTION, SOLUTION INTRAVENOUS; SUBCUTANEOUS
Qty: 15 ML | Refills: 11 | Status: SHIPPED | OUTPATIENT
Start: 2021-05-11 | End: 2021-09-14 | Stop reason: SDUPTHER

## 2021-05-11 RX ADMIN — CLONIDINE HYDROCHLORIDE 0.2 MG: 0.1 TABLET ORAL at 09:05

## 2021-05-11 RX ADMIN — INSULIN ASPART 10 UNITS: 100 INJECTION, SOLUTION INTRAVENOUS; SUBCUTANEOUS at 09:05

## 2021-05-11 RX ADMIN — INSULIN ASPART 10 UNITS: 100 INJECTION, SOLUTION INTRAVENOUS; SUBCUTANEOUS at 01:05

## 2021-05-11 RX ADMIN — ASPIRIN 81 MG: 81 TABLET, COATED ORAL at 09:05

## 2021-05-11 RX ADMIN — TACROLIMUS 2 MG: 1 CAPSULE ORAL at 09:05

## 2021-05-11 RX ADMIN — FAMOTIDINE 20 MG: 20 TABLET ORAL at 09:05

## 2021-05-11 RX ADMIN — NIFEDIPINE 60 MG: 30 TABLET, FILM COATED, EXTENDED RELEASE ORAL at 09:05

## 2021-05-11 RX ADMIN — SODIUM BICARBONATE 650 MG TABLET 1300 MG: at 09:05

## 2021-05-11 RX ADMIN — SEVELAMER CARBONATE 1600 MG: 800 TABLET, FILM COATED ORAL at 09:05

## 2021-05-11 RX ADMIN — HEPARIN SODIUM 1000 UNITS: 1000 INJECTION, SOLUTION INTRAVENOUS; SUBCUTANEOUS at 08:05

## 2021-05-11 RX ADMIN — MAGNESIUM 64 MG (MAGNESIUM CHLORIDE) TABLET,DELAYED RELEASE 128 MG: at 09:05

## 2021-05-11 RX ADMIN — PREDNISONE 5 MG: 5 TABLET ORAL at 09:05

## 2021-05-11 RX ADMIN — SEVELAMER CARBONATE 1600 MG: 800 TABLET, FILM COATED ORAL at 01:05

## 2021-05-12 ENCOUNTER — PATIENT MESSAGE (OUTPATIENT)
Dept: TRANSPLANT | Facility: CLINIC | Age: 61
End: 2021-05-12

## 2021-05-12 DIAGNOSIS — Z29.9 PREVENTIVE MEDICATION THERAPY NEEDED: ICD-10-CM

## 2021-05-12 DIAGNOSIS — T86.810 ANTIBODY MEDIATED REJECTION OF LUNG TRANSPLANT: Primary | ICD-10-CM

## 2021-05-12 RX ORDER — ONDANSETRON 8 MG/1
8 TABLET, ORALLY DISINTEGRATING ORAL DAILY PRN
Qty: 15 TABLET | Refills: 3 | Status: SHIPPED | OUTPATIENT
Start: 2021-05-12

## 2021-05-13 ENCOUNTER — TELEPHONE (OUTPATIENT)
Dept: TRANSPLANT | Facility: CLINIC | Age: 61
End: 2021-05-13

## 2021-05-14 ENCOUNTER — TELEPHONE (OUTPATIENT)
Dept: TRANSPLANT | Facility: CLINIC | Age: 61
End: 2021-05-14

## 2021-05-18 ENCOUNTER — PATIENT MESSAGE (OUTPATIENT)
Dept: TRANSPLANT | Facility: CLINIC | Age: 61
End: 2021-05-18

## 2021-05-26 ENCOUNTER — TELEPHONE (OUTPATIENT)
Dept: TRANSPLANT | Facility: CLINIC | Age: 61
End: 2021-05-26

## 2021-05-26 ENCOUNTER — OFFICE VISIT (OUTPATIENT)
Dept: TRANSPLANT | Facility: CLINIC | Age: 61
End: 2021-05-26
Attending: INTERNAL MEDICINE
Payer: MEDICARE

## 2021-05-26 ENCOUNTER — HOSPITAL ENCOUNTER (OUTPATIENT)
Dept: PULMONOLOGY | Facility: HOSPITAL | Age: 61
Discharge: HOME | End: 2021-05-26
Attending: INTERNAL MEDICINE
Payer: MEDICARE

## 2021-05-26 ENCOUNTER — HOSPITAL ENCOUNTER (OUTPATIENT)
Dept: RADIOLOGY | Facility: HOSPITAL | Age: 61
Discharge: HOME | End: 2021-05-26
Attending: INTERNAL MEDICINE
Payer: MEDICARE

## 2021-05-26 ENCOUNTER — PATIENT MESSAGE (OUTPATIENT)
Dept: TRANSPLANT | Facility: CLINIC | Age: 61
End: 2021-05-26

## 2021-05-26 VITALS
TEMPERATURE: 97 F | SYSTOLIC BLOOD PRESSURE: 154 MMHG | BODY MASS INDEX: 32.19 KG/M2 | DIASTOLIC BLOOD PRESSURE: 81 MMHG | HEART RATE: 86 BPM | RESPIRATION RATE: 20 BRPM | WEIGHT: 224.88 LBS | OXYGEN SATURATION: 98 % | HEIGHT: 70 IN

## 2021-05-26 DIAGNOSIS — D84.9 IMMUNOSUPPRESSION: ICD-10-CM

## 2021-05-26 DIAGNOSIS — T86.810 ANTIBODY MEDIATED REJECTION OF LUNG TRANSPLANT: ICD-10-CM

## 2021-05-26 DIAGNOSIS — Z94.2 LUNG REPLACED BY TRANSPLANT: ICD-10-CM

## 2021-05-26 DIAGNOSIS — Z79.2 PROPHYLACTIC ANTIBIOTIC: ICD-10-CM

## 2021-05-26 DIAGNOSIS — Z94.2 LUNG REPLACED BY TRANSPLANT: Primary | ICD-10-CM

## 2021-05-26 DIAGNOSIS — Z48.24 ENCOUNTER FOR AFTERCARE FOLLOWING LUNG TRANSPLANT: Primary | ICD-10-CM

## 2021-05-26 LAB
FEF 25 75 LLN: 1.54
FEF 25 75 PRE REF: 32.5 %
FEF 25 75 REF: 3.08
FEV05 LLN: 1.71
FEV05 REF: 2.85
FEV1 FVC LLN: 66
FEV1 FVC PRE REF: 96.3 %
FEV1 FVC REF: 78
FEV1 LLN: 2.77
FEV1 PRE REF: 36 %
FEV1 REF: 3.64
FVC LLN: 3.6
FVC PRE REF: 37.4 %
FVC REF: 4.7
PEF LLN: 7.05
PEF PRE REF: 41.6 %
PEF REF: 9.36
PRE FEF 25 75: 1 L/S (ref 1.54–5.15)
PRE FET 100: 6.11 SEC
PRE FEV05 REF: 36.5 %
PRE FEV1 FVC: 74.81 % (ref 65.92–87.98)
PRE FEV1: 1.31 L (ref 2.77–4.48)
PRE FEV5: 1.04 L (ref 1.71–3.98)
PRE FVC: 1.76 L (ref 3.6–5.82)
PRE PEF: 3.9 L/S (ref 7.05–11.68)

## 2021-05-26 PROCEDURE — 3008F BODY MASS INDEX DOCD: CPT | Mod: CPTII,S$GLB,, | Performed by: INTERNAL MEDICINE

## 2021-05-26 PROCEDURE — 1126F AMNT PAIN NOTED NONE PRSNT: CPT | Mod: S$GLB,,, | Performed by: INTERNAL MEDICINE

## 2021-05-26 PROCEDURE — 71046 X-RAY EXAM CHEST 2 VIEWS: CPT | Mod: 26,,, | Performed by: RADIOLOGY

## 2021-05-26 PROCEDURE — 99999 PR PBB SHADOW E&M-EST. PATIENT-LVL V: CPT | Mod: PBBFAC,,, | Performed by: INTERNAL MEDICINE

## 2021-05-26 PROCEDURE — 71046 X-RAY EXAM CHEST 2 VIEWS: CPT | Mod: TC

## 2021-05-26 PROCEDURE — 99999 PR PBB SHADOW E&M-EST. PATIENT-LVL V: ICD-10-PCS | Mod: PBBFAC,,, | Performed by: INTERNAL MEDICINE

## 2021-05-26 PROCEDURE — 71046 XR CHEST PA AND LATERAL: ICD-10-PCS | Mod: 26,,, | Performed by: RADIOLOGY

## 2021-05-26 PROCEDURE — 1126F PR PAIN SEVERITY QUANTIFIED, NO PAIN PRESENT: ICD-10-PCS | Mod: S$GLB,,, | Performed by: INTERNAL MEDICINE

## 2021-05-26 PROCEDURE — 99214 PR OFFICE/OUTPT VISIT, EST, LEVL IV, 30-39 MIN: ICD-10-PCS | Mod: 25,S$GLB,, | Performed by: INTERNAL MEDICINE

## 2021-05-26 PROCEDURE — 3008F PR BODY MASS INDEX (BMI) DOCUMENTED: ICD-10-PCS | Mod: CPTII,S$GLB,, | Performed by: INTERNAL MEDICINE

## 2021-05-26 PROCEDURE — 94010 BREATHING CAPACITY TEST: CPT | Mod: 26,,, | Performed by: INTERNAL MEDICINE

## 2021-05-26 PROCEDURE — 99214 OFFICE O/P EST MOD 30 MIN: CPT | Mod: 25,S$GLB,, | Performed by: INTERNAL MEDICINE

## 2021-05-26 PROCEDURE — 94010 BREATHING CAPACITY TEST: ICD-10-PCS | Mod: 26,,, | Performed by: INTERNAL MEDICINE

## 2021-05-26 RX ORDER — AMLODIPINE BESYLATE 10 MG/1
10 TABLET ORAL DAILY
COMMUNITY
Start: 2021-05-22 | End: 2021-08-11

## 2021-05-27 ENCOUNTER — PATIENT OUTREACH (OUTPATIENT)
Dept: ADMINISTRATIVE | Facility: HOSPITAL | Age: 61
End: 2021-05-27

## 2021-05-27 ENCOUNTER — TELEPHONE (OUTPATIENT)
Dept: TRANSPLANT | Facility: CLINIC | Age: 61
End: 2021-05-27

## 2021-06-01 ENCOUNTER — DOCUMENTATION ONLY (OUTPATIENT)
Dept: TRANSPLANT | Facility: CLINIC | Age: 61
End: 2021-06-01

## 2021-06-02 ENCOUNTER — HOSPITAL ENCOUNTER (OUTPATIENT)
Dept: PULMONOLOGY | Facility: HOSPITAL | Age: 61
Discharge: HOME | End: 2021-06-02
Attending: INTERNAL MEDICINE
Payer: MEDICARE

## 2021-06-02 ENCOUNTER — TELEPHONE (OUTPATIENT)
Dept: TRANSPLANT | Facility: CLINIC | Age: 61
End: 2021-06-02

## 2021-06-02 ENCOUNTER — PATIENT MESSAGE (OUTPATIENT)
Dept: TRANSPLANT | Facility: CLINIC | Age: 61
End: 2021-06-02

## 2021-06-02 ENCOUNTER — HOSPITAL ENCOUNTER (OUTPATIENT)
Dept: RADIOLOGY | Facility: HOSPITAL | Age: 61
Discharge: HOME | End: 2021-06-02
Attending: INTERNAL MEDICINE
Payer: MEDICARE

## 2021-06-02 ENCOUNTER — OFFICE VISIT (OUTPATIENT)
Dept: TRANSPLANT | Facility: CLINIC | Age: 61
End: 2021-06-02
Payer: MEDICARE

## 2021-06-02 VITALS
HEART RATE: 93 BPM | DIASTOLIC BLOOD PRESSURE: 73 MMHG | RESPIRATION RATE: 20 BRPM | OXYGEN SATURATION: 98 % | HEIGHT: 70 IN | WEIGHT: 231.5 LBS | TEMPERATURE: 98 F | BODY MASS INDEX: 33.14 KG/M2 | SYSTOLIC BLOOD PRESSURE: 154 MMHG

## 2021-06-02 DIAGNOSIS — T86.810 ANTIBODY MEDIATED REJECTION OF LUNG TRANSPLANT: ICD-10-CM

## 2021-06-02 DIAGNOSIS — E11.65 TYPE 2 DIABETES MELLITUS WITH HYPERGLYCEMIA, WITH LONG-TERM CURRENT USE OF INSULIN: ICD-10-CM

## 2021-06-02 DIAGNOSIS — Z48.24 ENCOUNTER FOR AFTERCARE FOLLOWING LUNG TRANSPLANT: Primary | ICD-10-CM

## 2021-06-02 DIAGNOSIS — Z94.2 LUNG REPLACED BY TRANSPLANT: ICD-10-CM

## 2021-06-02 DIAGNOSIS — N18.6 ESRD (END STAGE RENAL DISEASE): ICD-10-CM

## 2021-06-02 DIAGNOSIS — Z79.4 TYPE 2 DIABETES MELLITUS WITH HYPERGLYCEMIA, WITH LONG-TERM CURRENT USE OF INSULIN: ICD-10-CM

## 2021-06-02 DIAGNOSIS — Z79.2 PROPHYLACTIC ANTIBIOTIC: ICD-10-CM

## 2021-06-02 DIAGNOSIS — D84.9 IMMUNOSUPPRESSION: ICD-10-CM

## 2021-06-02 LAB
FEF 25 75 LLN: 1.54
FEF 25 75 PRE REF: 28.3 %
FEF 25 75 REF: 3.08
FEV05 LLN: 1.71
FEV05 REF: 2.85
FEV1 FVC LLN: 66
FEV1 FVC PRE REF: 93.3 %
FEV1 FVC REF: 78
FEV1 LLN: 2.76
FEV1 PRE REF: 35.3 %
FEV1 REF: 3.64
FVC LLN: 3.6
FVC PRE REF: 37.7 %
FVC REF: 4.7
PEF LLN: 7.05
PEF PRE REF: 45.2 %
PEF REF: 9.36
PRE FEF 25 75: 0.87 L/S (ref 1.54–5.15)
PRE FET 100: 6.24 SEC
PRE FEV05 REF: 35.9 %
PRE FEV1 FVC: 72.56 % (ref 65.92–87.97)
PRE FEV1: 1.29 L (ref 2.76–4.48)
PRE FEV5: 1.02 L (ref 1.71–3.98)
PRE FVC: 1.77 L (ref 3.6–5.81)
PRE PEF: 4.23 L/S (ref 7.05–11.68)

## 2021-06-02 PROCEDURE — 1111F PR DISCHARGE MEDS RECONCILED W/ CURRENT OUTPATIENT MED LIST: ICD-10-PCS | Mod: CPTII,S$GLB,, | Performed by: INTERNAL MEDICINE

## 2021-06-02 PROCEDURE — 99999 PR PBB SHADOW E&M-EST. PATIENT-LVL V: ICD-10-PCS | Mod: PBBFAC,,, | Performed by: INTERNAL MEDICINE

## 2021-06-02 PROCEDURE — 1126F PR PAIN SEVERITY QUANTIFIED, NO PAIN PRESENT: ICD-10-PCS | Mod: S$GLB,,, | Performed by: INTERNAL MEDICINE

## 2021-06-02 PROCEDURE — 99215 OFFICE O/P EST HI 40 MIN: CPT | Mod: S$GLB,,, | Performed by: INTERNAL MEDICINE

## 2021-06-02 PROCEDURE — 99999 PR PBB SHADOW E&M-EST. PATIENT-LVL V: CPT | Mod: PBBFAC,,, | Performed by: INTERNAL MEDICINE

## 2021-06-02 PROCEDURE — 71046 X-RAY EXAM CHEST 2 VIEWS: CPT | Mod: 26,,, | Performed by: RADIOLOGY

## 2021-06-02 PROCEDURE — 94010 BREATHING CAPACITY TEST: CPT | Mod: 26,,, | Performed by: INTERNAL MEDICINE

## 2021-06-02 PROCEDURE — 3008F PR BODY MASS INDEX (BMI) DOCUMENTED: ICD-10-PCS | Mod: CPTII,S$GLB,, | Performed by: INTERNAL MEDICINE

## 2021-06-02 PROCEDURE — 3052F HG A1C>EQUAL 8.0%<EQUAL 9.0%: CPT | Mod: CPTII,S$GLB,, | Performed by: INTERNAL MEDICINE

## 2021-06-02 PROCEDURE — 1126F AMNT PAIN NOTED NONE PRSNT: CPT | Mod: S$GLB,,, | Performed by: INTERNAL MEDICINE

## 2021-06-02 PROCEDURE — 3052F PR MOST RECENT HEMOGLOBIN A1C LEVEL 8.0 - < 9.0%: ICD-10-PCS | Mod: CPTII,S$GLB,, | Performed by: INTERNAL MEDICINE

## 2021-06-02 PROCEDURE — 3008F BODY MASS INDEX DOCD: CPT | Mod: CPTII,S$GLB,, | Performed by: INTERNAL MEDICINE

## 2021-06-02 PROCEDURE — 71046 X-RAY EXAM CHEST 2 VIEWS: CPT | Mod: TC

## 2021-06-02 PROCEDURE — 1111F DSCHRG MED/CURRENT MED MERGE: CPT | Mod: CPTII,S$GLB,, | Performed by: INTERNAL MEDICINE

## 2021-06-02 PROCEDURE — 99215 PR OFFICE/OUTPT VISIT, EST, LEVL V, 40-54 MIN: ICD-10-PCS | Mod: S$GLB,,, | Performed by: INTERNAL MEDICINE

## 2021-06-02 PROCEDURE — 71046 XR CHEST PA AND LATERAL: ICD-10-PCS | Mod: 26,,, | Performed by: RADIOLOGY

## 2021-06-02 PROCEDURE — 94010 BREATHING CAPACITY TEST: ICD-10-PCS | Mod: 26,,, | Performed by: INTERNAL MEDICINE

## 2021-06-07 ENCOUNTER — TELEPHONE (OUTPATIENT)
Dept: NEPHROLOGY | Facility: CLINIC | Age: 61
End: 2021-06-07

## 2021-06-09 RX ORDER — ROSUVASTATIN CALCIUM 10 MG/1
10 TABLET, COATED ORAL DAILY
Qty: 30 TABLET | Refills: 3 | Status: SHIPPED | OUTPATIENT
Start: 2021-06-09 | End: 2021-10-15 | Stop reason: SDUPTHER

## 2021-06-16 ENCOUNTER — TELEPHONE (OUTPATIENT)
Dept: TRANSPLANT | Facility: CLINIC | Age: 61
End: 2021-06-16

## 2021-06-18 DIAGNOSIS — N18.30 CHRONIC KIDNEY DISEASE (CKD), STAGE III (MODERATE): ICD-10-CM

## 2021-06-18 RX ORDER — FUROSEMIDE 40 MG/1
TABLET ORAL
Qty: 30 TABLET | Refills: 7 | Status: SHIPPED | OUTPATIENT
Start: 2021-06-18 | End: 2021-12-15 | Stop reason: SDUPTHER

## 2021-06-24 RX ORDER — VALSARTAN 160 MG/1
160 TABLET ORAL DAILY
Qty: 90 TABLET | Refills: 3 | Status: SHIPPED | OUTPATIENT
Start: 2021-06-24 | End: 2021-07-15

## 2021-06-25 ENCOUNTER — PATIENT MESSAGE (OUTPATIENT)
Dept: TRANSPLANT | Facility: CLINIC | Age: 61
End: 2021-06-25

## 2021-06-25 DIAGNOSIS — R91.1 SOLITARY PULMONARY NODULE: ICD-10-CM

## 2021-06-28 ENCOUNTER — TELEPHONE (OUTPATIENT)
Dept: NEPHROLOGY | Facility: CLINIC | Age: 61
End: 2021-06-28

## 2021-06-28 DIAGNOSIS — N18.6 ESRD (END STAGE RENAL DISEASE): Primary | ICD-10-CM

## 2021-07-01 DIAGNOSIS — N18.5 CKD (CHRONIC KIDNEY DISEASE) STAGE 5, GFR LESS THAN 15 ML/MIN: Primary | ICD-10-CM

## 2021-07-01 DIAGNOSIS — N18.6 ESRD (END STAGE RENAL DISEASE): ICD-10-CM

## 2021-07-01 DIAGNOSIS — Z01.818 PRE-OP EVALUATION: ICD-10-CM

## 2021-07-07 ENCOUNTER — HOSPITAL ENCOUNTER (OUTPATIENT)
Dept: PULMONOLOGY | Facility: HOSPITAL | Age: 61
Discharge: HOME | End: 2021-07-07
Attending: INTERNAL MEDICINE
Payer: MEDICARE

## 2021-07-07 ENCOUNTER — OFFICE VISIT (OUTPATIENT)
Dept: TRANSPLANT | Facility: CLINIC | Age: 61
End: 2021-07-07
Payer: MEDICARE

## 2021-07-07 ENCOUNTER — HOSPITAL ENCOUNTER (OUTPATIENT)
Dept: RADIOLOGY | Facility: HOSPITAL | Age: 61
Discharge: HOME | End: 2021-07-07
Attending: INTERNAL MEDICINE
Payer: MEDICARE

## 2021-07-07 VITALS
RESPIRATION RATE: 20 BRPM | HEIGHT: 70 IN | HEART RATE: 99 BPM | SYSTOLIC BLOOD PRESSURE: 149 MMHG | OXYGEN SATURATION: 96 % | BODY MASS INDEX: 32.51 KG/M2 | WEIGHT: 227.06 LBS | TEMPERATURE: 99 F | DIASTOLIC BLOOD PRESSURE: 84 MMHG

## 2021-07-07 DIAGNOSIS — Z94.2 LUNG REPLACED BY TRANSPLANT: ICD-10-CM

## 2021-07-07 DIAGNOSIS — N18.6 ESRD (END STAGE RENAL DISEASE): ICD-10-CM

## 2021-07-07 DIAGNOSIS — Z79.4 TYPE 2 DIABETES MELLITUS WITH HYPERGLYCEMIA, WITH LONG-TERM CURRENT USE OF INSULIN: ICD-10-CM

## 2021-07-07 DIAGNOSIS — R91.1 SOLITARY PULMONARY NODULE: ICD-10-CM

## 2021-07-07 DIAGNOSIS — Z79.2 PROPHYLACTIC ANTIBIOTIC: ICD-10-CM

## 2021-07-07 DIAGNOSIS — R91.8 PULMONARY NODULES: Primary | ICD-10-CM

## 2021-07-07 DIAGNOSIS — Z48.24 ENCOUNTER FOR AFTERCARE FOLLOWING LUNG TRANSPLANT: ICD-10-CM

## 2021-07-07 DIAGNOSIS — D84.9 IMMUNOSUPPRESSION: ICD-10-CM

## 2021-07-07 DIAGNOSIS — J96.11 CHRONIC RESPIRATORY FAILURE WITH HYPOXIA: ICD-10-CM

## 2021-07-07 DIAGNOSIS — E11.65 TYPE 2 DIABETES MELLITUS WITH HYPERGLYCEMIA, WITH LONG-TERM CURRENT USE OF INSULIN: ICD-10-CM

## 2021-07-07 LAB
FEF 25 75 LLN: 1.54
FEF 25 75 PRE REF: 28.7 %
FEF 25 75 REF: 3.08
FEV05 LLN: 1.71
FEV05 REF: 2.85
FEV1 FVC LLN: 66
FEV1 FVC PRE REF: 92.7 %
FEV1 FVC REF: 78
FEV1 LLN: 2.76
FEV1 PRE REF: 36.5 %
FEV1 REF: 3.64
FVC LLN: 3.6
FVC PRE REF: 39.2 %
FVC REF: 4.7
PEF LLN: 7.05
PEF PRE REF: 47.4 %
PEF REF: 9.36
PRE FEF 25 75: 0.88 L/S (ref 1.54–5.15)
PRE FET 100: 5.65 SEC
PRE FEV05 REF: 37.2 %
PRE FEV1 FVC: 72.06 % (ref 65.89–87.97)
PRE FEV1: 1.33 L (ref 2.76–4.47)
PRE FEV5: 1.06 L (ref 1.71–3.98)
PRE FVC: 1.84 L (ref 3.6–5.81)
PRE PEF: 4.44 L/S (ref 7.05–11.68)

## 2021-07-07 PROCEDURE — 99215 OFFICE O/P EST HI 40 MIN: CPT | Mod: 25,S$GLB,, | Performed by: INTERNAL MEDICINE

## 2021-07-07 PROCEDURE — 3008F PR BODY MASS INDEX (BMI) DOCUMENTED: ICD-10-PCS | Mod: CPTII,S$GLB,, | Performed by: INTERNAL MEDICINE

## 2021-07-07 PROCEDURE — 71250 CT CHEST WITHOUT CONTRAST: ICD-10-PCS | Mod: 26,,, | Performed by: RADIOLOGY

## 2021-07-07 PROCEDURE — 71250 CT THORAX DX C-: CPT | Mod: 26,,, | Performed by: RADIOLOGY

## 2021-07-07 PROCEDURE — 3052F PR MOST RECENT HEMOGLOBIN A1C LEVEL 8.0 - < 9.0%: ICD-10-PCS | Mod: CPTII,S$GLB,, | Performed by: INTERNAL MEDICINE

## 2021-07-07 PROCEDURE — 71250 CT THORAX DX C-: CPT | Mod: TC

## 2021-07-07 PROCEDURE — 94010 BREATHING CAPACITY TEST: CPT | Mod: 26,,, | Performed by: INTERNAL MEDICINE

## 2021-07-07 PROCEDURE — 94010 BREATHING CAPACITY TEST: ICD-10-PCS | Mod: 26,,, | Performed by: INTERNAL MEDICINE

## 2021-07-07 PROCEDURE — 3052F HG A1C>EQUAL 8.0%<EQUAL 9.0%: CPT | Mod: CPTII,S$GLB,, | Performed by: INTERNAL MEDICINE

## 2021-07-07 PROCEDURE — 3008F BODY MASS INDEX DOCD: CPT | Mod: CPTII,S$GLB,, | Performed by: INTERNAL MEDICINE

## 2021-07-07 PROCEDURE — 99999 PR PBB SHADOW E&M-EST. PATIENT-LVL IV: ICD-10-PCS | Mod: PBBFAC,,, | Performed by: INTERNAL MEDICINE

## 2021-07-07 PROCEDURE — 99215 PR OFFICE/OUTPT VISIT, EST, LEVL V, 40-54 MIN: ICD-10-PCS | Mod: 25,S$GLB,, | Performed by: INTERNAL MEDICINE

## 2021-07-07 PROCEDURE — 99999 PR PBB SHADOW E&M-EST. PATIENT-LVL IV: CPT | Mod: PBBFAC,,, | Performed by: INTERNAL MEDICINE

## 2021-07-07 RX ORDER — TACROLIMUS 0.5 MG/1
CAPSULE ORAL
Qty: 300 CAPSULE | Refills: 11 | Status: SHIPPED | OUTPATIENT
Start: 2021-07-07 | End: 2021-12-09 | Stop reason: DRUGHIGH

## 2021-07-12 ENCOUNTER — HOSPITAL ENCOUNTER (OUTPATIENT)
Dept: VASCULAR SURGERY | Facility: CLINIC | Age: 61
Discharge: HOME | End: 2021-07-12
Attending: SURGERY
Payer: MEDICARE

## 2021-07-12 ENCOUNTER — HOSPITAL ENCOUNTER (OUTPATIENT)
Dept: CARDIOLOGY | Facility: CLINIC | Age: 61
Discharge: HOME | End: 2021-07-12
Attending: SURGERY
Payer: MEDICARE

## 2021-07-12 ENCOUNTER — INITIAL CONSULT (OUTPATIENT)
Dept: VASCULAR SURGERY | Facility: CLINIC | Age: 61
End: 2021-07-12
Attending: SURGERY
Payer: MEDICARE

## 2021-07-12 VITALS
SYSTOLIC BLOOD PRESSURE: 139 MMHG | BODY MASS INDEX: 31.48 KG/M2 | WEIGHT: 224.88 LBS | DIASTOLIC BLOOD PRESSURE: 89 MMHG | TEMPERATURE: 100 F | HEART RATE: 111 BPM | HEIGHT: 71 IN

## 2021-07-12 DIAGNOSIS — Z01.818 PRE-OP EVALUATION: Primary | ICD-10-CM

## 2021-07-12 DIAGNOSIS — Z99.2 ESRD ON HEMODIALYSIS: Primary | ICD-10-CM

## 2021-07-12 DIAGNOSIS — Z01.818 PRE-OP EVALUATION: ICD-10-CM

## 2021-07-12 DIAGNOSIS — N18.6 ESRD (END STAGE RENAL DISEASE): Primary | ICD-10-CM

## 2021-07-12 DIAGNOSIS — N18.6 ESRD ON HEMODIALYSIS: Primary | ICD-10-CM

## 2021-07-12 PROCEDURE — 93010 EKG 12-LEAD: ICD-10-PCS | Mod: S$GLB,,, | Performed by: INTERNAL MEDICINE

## 2021-07-12 PROCEDURE — 93010 ELECTROCARDIOGRAM REPORT: CPT | Mod: S$GLB,,, | Performed by: INTERNAL MEDICINE

## 2021-07-12 PROCEDURE — 93970 EXTREMITY STUDY: CPT | Mod: S$GLB,,, | Performed by: SURGERY

## 2021-07-12 PROCEDURE — 93005 ELECTROCARDIOGRAM TRACING: CPT | Mod: S$GLB,,, | Performed by: SURGERY

## 2021-07-12 PROCEDURE — 93970 PR US DUPLEX, UPPER OR LOWER EXT VENOUS,COMPLETE BILAT: ICD-10-PCS | Mod: S$GLB,,, | Performed by: SURGERY

## 2021-07-12 PROCEDURE — 93005 EKG 12-LEAD: ICD-10-PCS | Mod: S$GLB,,, | Performed by: SURGERY

## 2021-07-12 PROCEDURE — 99202 OFFICE O/P NEW SF 15 MIN: CPT | Mod: S$GLB,,, | Performed by: SURGERY

## 2021-07-12 PROCEDURE — 99999 PR PBB SHADOW E&M-EST. PATIENT-LVL IV: ICD-10-PCS | Mod: PBBFAC,,, | Performed by: SURGERY

## 2021-07-12 PROCEDURE — 99202 PR OFFICE/OUTPT VISIT, NEW, LEVL II, 15-29 MIN: ICD-10-PCS | Mod: S$GLB,,, | Performed by: SURGERY

## 2021-07-12 PROCEDURE — 99999 PR PBB SHADOW E&M-EST. PATIENT-LVL IV: CPT | Mod: PBBFAC,,, | Performed by: SURGERY

## 2021-07-13 ENCOUNTER — TELEPHONE (OUTPATIENT)
Dept: TRANSPLANT | Facility: CLINIC | Age: 61
End: 2021-07-13

## 2021-07-13 ENCOUNTER — PATIENT MESSAGE (OUTPATIENT)
Dept: TRANSPLANT | Facility: CLINIC | Age: 61
End: 2021-07-13

## 2021-07-15 RX ORDER — VALSARTAN 160 MG/1
160 TABLET ORAL 2 TIMES DAILY
Qty: 180 TABLET | Refills: 3 | Status: SHIPPED | OUTPATIENT
Start: 2021-07-15 | End: 2022-07-18

## 2021-07-30 ENCOUNTER — LAB VISIT (OUTPATIENT)
Dept: LAB | Facility: HOSPITAL | Age: 61
End: 2021-07-30
Attending: FAMILY MEDICINE
Payer: MEDICARE

## 2021-07-30 ENCOUNTER — TELEPHONE (OUTPATIENT)
Dept: VASCULAR SURGERY | Facility: CLINIC | Age: 61
End: 2021-07-30

## 2021-07-30 DIAGNOSIS — Z79.899 ENCOUNTER FOR LONG-TERM (CURRENT) USE OF MEDICATIONS: ICD-10-CM

## 2021-07-30 DIAGNOSIS — Z13.220 ENCOUNTER FOR LIPID SCREENING FOR CARDIOVASCULAR DISEASE: ICD-10-CM

## 2021-07-30 DIAGNOSIS — Z79.4 TYPE 2 DIABETES MELLITUS WITH HYPERGLYCEMIA, WITH LONG-TERM CURRENT USE OF INSULIN: ICD-10-CM

## 2021-07-30 DIAGNOSIS — E11.22 CKD STAGE 4 DUE TO TYPE 2 DIABETES MELLITUS: ICD-10-CM

## 2021-07-30 DIAGNOSIS — E78.2 COMBINED HYPERLIPIDEMIA ASSOCIATED WITH TYPE 2 DIABETES MELLITUS: ICD-10-CM

## 2021-07-30 DIAGNOSIS — E11.59 HYPERTENSION ASSOCIATED WITH DIABETES: ICD-10-CM

## 2021-07-30 DIAGNOSIS — Z01.818 PRE-OP TESTING: Primary | ICD-10-CM

## 2021-07-30 DIAGNOSIS — N18.4 CKD STAGE 4 DUE TO TYPE 2 DIABETES MELLITUS: ICD-10-CM

## 2021-07-30 DIAGNOSIS — Z13.6 ENCOUNTER FOR LIPID SCREENING FOR CARDIOVASCULAR DISEASE: ICD-10-CM

## 2021-07-30 DIAGNOSIS — I15.2 HYPERTENSION ASSOCIATED WITH DIABETES: ICD-10-CM

## 2021-07-30 DIAGNOSIS — Z00.01 ENCOUNTER FOR GENERAL ADULT MEDICAL EXAMINATION WITH ABNORMAL FINDINGS: ICD-10-CM

## 2021-07-30 DIAGNOSIS — E11.65 TYPE 2 DIABETES MELLITUS WITH HYPERGLYCEMIA, WITH LONG-TERM CURRENT USE OF INSULIN: ICD-10-CM

## 2021-07-30 DIAGNOSIS — E11.69 COMBINED HYPERLIPIDEMIA ASSOCIATED WITH TYPE 2 DIABETES MELLITUS: ICD-10-CM

## 2021-07-30 LAB
ESTIMATED AVG GLUCOSE: 226 MG/DL (ref 68–131)
HBA1C MFR BLD: 9.5 % (ref 4–5.6)

## 2021-07-30 PROCEDURE — 36415 COLL VENOUS BLD VENIPUNCTURE: CPT | Mod: PO | Performed by: FAMILY MEDICINE

## 2021-07-30 PROCEDURE — 83036 HEMOGLOBIN GLYCOSYLATED A1C: CPT | Performed by: FAMILY MEDICINE

## 2021-07-31 ENCOUNTER — LAB VISIT (OUTPATIENT)
Dept: FAMILY MEDICINE | Facility: CLINIC | Age: 61
End: 2021-07-31
Payer: MEDICARE

## 2021-07-31 DIAGNOSIS — Z01.818 PRE-OP TESTING: ICD-10-CM

## 2021-07-31 PROCEDURE — U0005 INFEC AGEN DETEC AMPLI PROBE: HCPCS | Performed by: SURGERY

## 2021-07-31 PROCEDURE — U0003 INFECTIOUS AGENT DETECTION BY NUCLEIC ACID (DNA OR RNA); SEVERE ACUTE RESPIRATORY SYNDROME CORONAVIRUS 2 (SARS-COV-2) (CORONAVIRUS DISEASE [COVID-19]), AMPLIFIED PROBE TECHNIQUE, MAKING USE OF HIGH THROUGHPUT TECHNOLOGIES AS DESCRIBED BY CMS-2020-01-R: HCPCS | Performed by: SURGERY

## 2021-08-02 ENCOUNTER — TELEPHONE (OUTPATIENT)
Dept: VASCULAR SURGERY | Facility: CLINIC | Age: 61
End: 2021-08-02

## 2021-08-02 LAB
SARS-COV-2 RNA RESP QL NAA+PROBE: NOT DETECTED
SARS-COV-2- CYCLE NUMBER: -1

## 2021-08-11 ENCOUNTER — PATIENT MESSAGE (OUTPATIENT)
Dept: TRANSPLANT | Facility: CLINIC | Age: 61
End: 2021-08-11

## 2021-08-11 DIAGNOSIS — Z94.2 LUNG REPLACED BY TRANSPLANT: ICD-10-CM

## 2021-08-11 RX ORDER — PREDNISONE 5 MG/1
5 TABLET ORAL DAILY
Qty: 30 TABLET | Refills: 11 | Status: SHIPPED | OUTPATIENT
Start: 2021-08-11 | End: 2021-09-14 | Stop reason: SDUPTHER

## 2021-08-12 ENCOUNTER — DOCUMENTATION ONLY (OUTPATIENT)
Dept: TRANSPLANT | Facility: CLINIC | Age: 61
End: 2021-08-12

## 2021-08-14 ENCOUNTER — LAB VISIT (OUTPATIENT)
Dept: FAMILY MEDICINE | Facility: CLINIC | Age: 61
End: 2021-08-14
Payer: MEDICARE

## 2021-08-14 DIAGNOSIS — Z01.812 PRE-PROCEDURE LAB EXAM: ICD-10-CM

## 2021-08-14 PROCEDURE — U0003 INFECTIOUS AGENT DETECTION BY NUCLEIC ACID (DNA OR RNA); SEVERE ACUTE RESPIRATORY SYNDROME CORONAVIRUS 2 (SARS-COV-2) (CORONAVIRUS DISEASE [COVID-19]), AMPLIFIED PROBE TECHNIQUE, MAKING USE OF HIGH THROUGHPUT TECHNOLOGIES AS DESCRIBED BY CMS-2020-01-R: HCPCS | Performed by: INTERNAL MEDICINE

## 2021-08-14 PROCEDURE — U0005 INFEC AGEN DETEC AMPLI PROBE: HCPCS | Performed by: INTERNAL MEDICINE

## 2021-08-15 LAB
SARS-COV-2 RNA RESP QL NAA+PROBE: NOT DETECTED
SARS-COV-2- CYCLE NUMBER: -1

## 2021-08-16 ENCOUNTER — OFFICE VISIT (OUTPATIENT)
Dept: TRANSPLANT | Facility: CLINIC | Age: 61
End: 2021-08-16
Payer: MEDICARE

## 2021-08-16 ENCOUNTER — HOSPITAL ENCOUNTER (OUTPATIENT)
Dept: PULMONOLOGY | Facility: CLINIC | Age: 61
Discharge: HOME | End: 2021-08-16
Payer: MEDICARE

## 2021-08-16 ENCOUNTER — HOSPITAL ENCOUNTER (OUTPATIENT)
Dept: RADIOLOGY | Facility: HOSPITAL | Age: 61
Discharge: HOME | End: 2021-08-16
Attending: INTERNAL MEDICINE
Payer: MEDICARE

## 2021-08-16 VITALS
WEIGHT: 227.06 LBS | BODY MASS INDEX: 32.51 KG/M2 | HEIGHT: 70 IN | SYSTOLIC BLOOD PRESSURE: 149 MMHG | RESPIRATION RATE: 20 BRPM | TEMPERATURE: 97 F | OXYGEN SATURATION: 96 % | DIASTOLIC BLOOD PRESSURE: 79 MMHG | HEART RATE: 86 BPM

## 2021-08-16 DIAGNOSIS — E11.65 TYPE 2 DIABETES MELLITUS WITH HYPERGLYCEMIA, WITH LONG-TERM CURRENT USE OF INSULIN: ICD-10-CM

## 2021-08-16 DIAGNOSIS — R60.0 EDEMA OF RIGHT LOWER EXTREMITY: ICD-10-CM

## 2021-08-16 DIAGNOSIS — Z79.2 PROPHYLACTIC ANTIBIOTIC: ICD-10-CM

## 2021-08-16 DIAGNOSIS — Z94.2 LUNG REPLACED BY TRANSPLANT: ICD-10-CM

## 2021-08-16 DIAGNOSIS — N18.6 ESRD (END STAGE RENAL DISEASE): ICD-10-CM

## 2021-08-16 DIAGNOSIS — D84.9 IMMUNOSUPPRESSION: ICD-10-CM

## 2021-08-16 DIAGNOSIS — J96.11 CHRONIC RESPIRATORY FAILURE WITH HYPOXIA: ICD-10-CM

## 2021-08-16 DIAGNOSIS — Z48.24 ENCOUNTER FOR AFTERCARE FOLLOWING LUNG TRANSPLANT: Primary | ICD-10-CM

## 2021-08-16 DIAGNOSIS — Z79.4 TYPE 2 DIABETES MELLITUS WITH HYPERGLYCEMIA, WITH LONG-TERM CURRENT USE OF INSULIN: ICD-10-CM

## 2021-08-16 LAB
FEF 25 75 LLN: 1.53
FEF 25 75 PRE REF: 26.4 %
FEF 25 75 REF: 3.07
FEV05 LLN: 1.71
FEV05 REF: 2.85
FEV1 FVC LLN: 66
FEV1 FVC PRE REF: 91 %
FEV1 FVC REF: 78
FEV1 LLN: 2.76
FEV1 PRE REF: 34.8 %
FEV1 REF: 3.64
FVC LLN: 3.59
FVC PRE REF: 38.2 %
FVC REF: 4.69
PEF LLN: 7.05
PEF PRE REF: 51.9 %
PEF REF: 9.36
PRE FEF 25 75: 0.81 L/S (ref 1.53–5.14)
PRE FET 100: 6.08 SEC
PRE FEV05 REF: 34.7 %
PRE FEV1 FVC: 70.68 % (ref 65.86–87.97)
PRE FEV1: 1.27 L (ref 2.76–4.47)
PRE FEV5: 0.99 L (ref 1.71–3.98)
PRE FVC: 1.79 L (ref 3.59–5.81)
PRE PEF: 4.86 L/S (ref 7.05–11.68)

## 2021-08-16 PROCEDURE — 3046F PR MOST RECENT HEMOGLOBIN A1C LEVEL > 9.0%: ICD-10-PCS | Mod: CPTII,S$GLB,, | Performed by: PHYSICIAN ASSISTANT

## 2021-08-16 PROCEDURE — 99999 PR PBB SHADOW E&M-EST. PATIENT-LVL V: ICD-10-PCS | Mod: PBBFAC,,, | Performed by: PHYSICIAN ASSISTANT

## 2021-08-16 PROCEDURE — 99215 PR OFFICE/OUTPT VISIT, EST, LEVL V, 40-54 MIN: ICD-10-PCS | Mod: 25,S$GLB,, | Performed by: PHYSICIAN ASSISTANT

## 2021-08-16 PROCEDURE — 3078F DIAST BP <80 MM HG: CPT | Mod: CPTII,S$GLB,, | Performed by: PHYSICIAN ASSISTANT

## 2021-08-16 PROCEDURE — 94010 BREATHING CAPACITY TEST: CPT | Mod: S$GLB,,, | Performed by: INTERNAL MEDICINE

## 2021-08-16 PROCEDURE — 71046 X-RAY EXAM CHEST 2 VIEWS: CPT | Mod: TC

## 2021-08-16 PROCEDURE — 99999 PR PBB SHADOW E&M-EST. PATIENT-LVL V: CPT | Mod: PBBFAC,,, | Performed by: PHYSICIAN ASSISTANT

## 2021-08-16 PROCEDURE — 99215 OFFICE O/P EST HI 40 MIN: CPT | Mod: 25,S$GLB,, | Performed by: PHYSICIAN ASSISTANT

## 2021-08-16 PROCEDURE — 71046 XR CHEST PA AND LATERAL: ICD-10-PCS | Mod: 26,,, | Performed by: RADIOLOGY

## 2021-08-16 PROCEDURE — 3078F PR MOST RECENT DIASTOLIC BLOOD PRESSURE < 80 MM HG: ICD-10-PCS | Mod: CPTII,S$GLB,, | Performed by: PHYSICIAN ASSISTANT

## 2021-08-16 PROCEDURE — 3077F PR MOST RECENT SYSTOLIC BLOOD PRESSURE >= 140 MM HG: ICD-10-PCS | Mod: CPTII,S$GLB,, | Performed by: PHYSICIAN ASSISTANT

## 2021-08-16 PROCEDURE — 1160F RVW MEDS BY RX/DR IN RCRD: CPT | Mod: CPTII,S$GLB,, | Performed by: PHYSICIAN ASSISTANT

## 2021-08-16 PROCEDURE — 94010 BREATHING CAPACITY TEST: ICD-10-PCS | Mod: S$GLB,,, | Performed by: INTERNAL MEDICINE

## 2021-08-16 PROCEDURE — 1159F MED LIST DOCD IN RCRD: CPT | Mod: CPTII,S$GLB,, | Performed by: PHYSICIAN ASSISTANT

## 2021-08-16 PROCEDURE — 3077F SYST BP >= 140 MM HG: CPT | Mod: CPTII,S$GLB,, | Performed by: PHYSICIAN ASSISTANT

## 2021-08-16 PROCEDURE — 1160F PR REVIEW ALL MEDS BY PRESCRIBER/CLIN PHARMACIST DOCUMENTED: ICD-10-PCS | Mod: CPTII,S$GLB,, | Performed by: PHYSICIAN ASSISTANT

## 2021-08-16 PROCEDURE — 3046F HEMOGLOBIN A1C LEVEL >9.0%: CPT | Mod: CPTII,S$GLB,, | Performed by: PHYSICIAN ASSISTANT

## 2021-08-16 PROCEDURE — 3008F BODY MASS INDEX DOCD: CPT | Mod: CPTII,S$GLB,, | Performed by: PHYSICIAN ASSISTANT

## 2021-08-16 PROCEDURE — 1159F PR MEDICATION LIST DOCUMENTED IN MEDICAL RECORD: ICD-10-PCS | Mod: CPTII,S$GLB,, | Performed by: PHYSICIAN ASSISTANT

## 2021-08-16 PROCEDURE — 3008F PR BODY MASS INDEX (BMI) DOCUMENTED: ICD-10-PCS | Mod: CPTII,S$GLB,, | Performed by: PHYSICIAN ASSISTANT

## 2021-08-16 PROCEDURE — 71046 X-RAY EXAM CHEST 2 VIEWS: CPT | Mod: 26,,, | Performed by: RADIOLOGY

## 2021-08-17 ENCOUNTER — PATIENT MESSAGE (OUTPATIENT)
Dept: FAMILY MEDICINE | Facility: CLINIC | Age: 61
End: 2021-08-17

## 2021-08-17 ENCOUNTER — TELEPHONE (OUTPATIENT)
Dept: RADIOLOGY | Facility: HOSPITAL | Age: 61
End: 2021-08-17

## 2021-08-18 ENCOUNTER — HOSPITAL ENCOUNTER (OUTPATIENT)
Dept: RADIOLOGY | Facility: HOSPITAL | Age: 61
Discharge: HOME | End: 2021-08-18
Attending: PHYSICIAN ASSISTANT
Payer: MEDICARE

## 2021-08-18 DIAGNOSIS — R60.0 EDEMA OF RIGHT LOWER EXTREMITY: ICD-10-CM

## 2021-08-18 PROCEDURE — 93970 EXTREMITY STUDY: CPT | Mod: 26,,, | Performed by: RADIOLOGY

## 2021-08-18 PROCEDURE — 93970 EXTREMITY STUDY: CPT | Mod: TC,PO

## 2021-08-18 PROCEDURE — 93970 US LOWER EXTREMITY VEINS BILATERAL: ICD-10-PCS | Mod: 26,,, | Performed by: RADIOLOGY

## 2021-08-24 NOTE — TELEPHONE ENCOUNTER
Received critical lab for pt Glucose  value 593.   PAST MEDICAL HISTORY:  ESRD on dialysis s/p cadaver kidney transplant recipient 7/23/2018    H/O kidney transplant double JJ stent in place    HTN (hypertension)     Nephrostomy status capped nephrostomy tube in place

## 2021-08-25 DIAGNOSIS — Z94.2 LUNG REPLACED BY TRANSPLANT: Primary | ICD-10-CM

## 2021-08-25 DIAGNOSIS — Z01.812 PRE-PROCEDURE LAB EXAM: ICD-10-CM

## 2021-08-26 ENCOUNTER — IMMUNIZATION (OUTPATIENT)
Dept: FAMILY MEDICINE | Facility: CLINIC | Age: 61
End: 2021-08-26
Payer: MEDICARE

## 2021-08-26 DIAGNOSIS — Z23 NEED FOR VACCINATION: Primary | ICD-10-CM

## 2021-08-26 PROCEDURE — 0003A COVID-19, MRNA, LNP-S, PF, 30 MCG/0.3 ML DOSE VACCINE: ICD-10-PCS | Mod: CV19,,, | Performed by: FAMILY MEDICINE

## 2021-08-26 PROCEDURE — 91300 COVID-19, MRNA, LNP-S, PF, 30 MCG/0.3 ML DOSE VACCINE: ICD-10-PCS | Mod: ,,, | Performed by: FAMILY MEDICINE

## 2021-08-26 PROCEDURE — 91300 COVID-19, MRNA, LNP-S, PF, 30 MCG/0.3 ML DOSE VACCINE: CPT | Mod: ,,, | Performed by: FAMILY MEDICINE

## 2021-08-26 PROCEDURE — 0003A COVID-19, MRNA, LNP-S, PF, 30 MCG/0.3 ML DOSE VACCINE: CPT | Mod: CV19,,, | Performed by: FAMILY MEDICINE

## 2021-08-27 RX ORDER — CARVEDILOL 3.12 MG/1
TABLET ORAL
Qty: 60 TABLET | Refills: 11 | Status: SHIPPED | OUTPATIENT
Start: 2021-08-27 | End: 2022-09-05

## 2021-09-04 ENCOUNTER — PATIENT MESSAGE (OUTPATIENT)
Dept: NEPHROLOGY | Facility: CLINIC | Age: 61
End: 2021-09-04

## 2021-09-04 ENCOUNTER — PATIENT MESSAGE (OUTPATIENT)
Dept: FAMILY MEDICINE | Facility: CLINIC | Age: 61
End: 2021-09-04

## 2021-09-04 ENCOUNTER — PATIENT MESSAGE (OUTPATIENT)
Dept: TRANSPLANT | Facility: CLINIC | Age: 61
End: 2021-09-04

## 2021-09-14 ENCOUNTER — OFFICE VISIT (OUTPATIENT)
Dept: FAMILY MEDICINE | Facility: CLINIC | Age: 61
End: 2021-09-14
Payer: MEDICARE

## 2021-09-14 VITALS
SYSTOLIC BLOOD PRESSURE: 127 MMHG | HEART RATE: 94 BPM | TEMPERATURE: 98 F | HEIGHT: 70 IN | BODY MASS INDEX: 32.24 KG/M2 | OXYGEN SATURATION: 96 % | DIASTOLIC BLOOD PRESSURE: 82 MMHG | WEIGHT: 225.19 LBS

## 2021-09-14 DIAGNOSIS — E11.65 TYPE 2 DIABETES MELLITUS WITH HYPERGLYCEMIA, WITH LONG-TERM CURRENT USE OF INSULIN: ICD-10-CM

## 2021-09-14 DIAGNOSIS — Z12.5 ENCOUNTER FOR PROSTATE CANCER SCREENING: ICD-10-CM

## 2021-09-14 DIAGNOSIS — Z94.2 LUNG REPLACED BY TRANSPLANT: ICD-10-CM

## 2021-09-14 DIAGNOSIS — E78.2 COMBINED HYPERLIPIDEMIA ASSOCIATED WITH TYPE 2 DIABETES MELLITUS: ICD-10-CM

## 2021-09-14 DIAGNOSIS — Z79.899 ENCOUNTER FOR LONG-TERM (CURRENT) USE OF MEDICATIONS: ICD-10-CM

## 2021-09-14 DIAGNOSIS — I15.2 HYPERTENSION ASSOCIATED WITH DIABETES: ICD-10-CM

## 2021-09-14 DIAGNOSIS — Z79.4 TYPE 2 DIABETES MELLITUS WITH HYPERGLYCEMIA, WITH LONG-TERM CURRENT USE OF INSULIN: ICD-10-CM

## 2021-09-14 DIAGNOSIS — E11.69 COMBINED HYPERLIPIDEMIA ASSOCIATED WITH TYPE 2 DIABETES MELLITUS: ICD-10-CM

## 2021-09-14 DIAGNOSIS — Z13.220 ENCOUNTER FOR LIPID SCREENING FOR CARDIOVASCULAR DISEASE: ICD-10-CM

## 2021-09-14 DIAGNOSIS — Z13.6 ENCOUNTER FOR LIPID SCREENING FOR CARDIOVASCULAR DISEASE: ICD-10-CM

## 2021-09-14 DIAGNOSIS — E11.22 CKD STAGE 5 DUE TO TYPE 2 DIABETES MELLITUS: ICD-10-CM

## 2021-09-14 DIAGNOSIS — Z00.00 WELL ADULT EXAM: Primary | ICD-10-CM

## 2021-09-14 DIAGNOSIS — N18.6 END-STAGE RENAL DISEASE: ICD-10-CM

## 2021-09-14 DIAGNOSIS — N18.5 CKD STAGE 5 DUE TO TYPE 2 DIABETES MELLITUS: ICD-10-CM

## 2021-09-14 DIAGNOSIS — E11.59 HYPERTENSION ASSOCIATED WITH DIABETES: ICD-10-CM

## 2021-09-14 PROCEDURE — 99396 PR PREVENTIVE VISIT,EST,40-64: ICD-10-PCS | Mod: S$GLB,,, | Performed by: FAMILY MEDICINE

## 2021-09-14 PROCEDURE — 1160F PR REVIEW ALL MEDS BY PRESCRIBER/CLIN PHARMACIST DOCUMENTED: ICD-10-PCS | Mod: CPTII,S$GLB,, | Performed by: FAMILY MEDICINE

## 2021-09-14 PROCEDURE — 3008F BODY MASS INDEX DOCD: CPT | Mod: CPTII,S$GLB,, | Performed by: FAMILY MEDICINE

## 2021-09-14 PROCEDURE — 3074F PR MOST RECENT SYSTOLIC BLOOD PRESSURE < 130 MM HG: ICD-10-PCS | Mod: CPTII,S$GLB,, | Performed by: FAMILY MEDICINE

## 2021-09-14 PROCEDURE — 3079F PR MOST RECENT DIASTOLIC BLOOD PRESSURE 80-89 MM HG: ICD-10-PCS | Mod: CPTII,S$GLB,, | Performed by: FAMILY MEDICINE

## 2021-09-14 PROCEDURE — 4010F ACE/ARB THERAPY RXD/TAKEN: CPT | Mod: CPTII,S$GLB,, | Performed by: FAMILY MEDICINE

## 2021-09-14 PROCEDURE — 4010F PR ACE/ARB THEARPY RXD/TAKEN: ICD-10-PCS | Mod: CPTII,S$GLB,, | Performed by: FAMILY MEDICINE

## 2021-09-14 PROCEDURE — 1160F RVW MEDS BY RX/DR IN RCRD: CPT | Mod: CPTII,S$GLB,, | Performed by: FAMILY MEDICINE

## 2021-09-14 PROCEDURE — 3046F HEMOGLOBIN A1C LEVEL >9.0%: CPT | Mod: CPTII,S$GLB,, | Performed by: FAMILY MEDICINE

## 2021-09-14 PROCEDURE — 99999 PR PBB SHADOW E&M-EST. PATIENT-LVL V: ICD-10-PCS | Mod: PBBFAC,,, | Performed by: FAMILY MEDICINE

## 2021-09-14 PROCEDURE — 3008F PR BODY MASS INDEX (BMI) DOCUMENTED: ICD-10-PCS | Mod: CPTII,S$GLB,, | Performed by: FAMILY MEDICINE

## 2021-09-14 PROCEDURE — 3079F DIAST BP 80-89 MM HG: CPT | Mod: CPTII,S$GLB,, | Performed by: FAMILY MEDICINE

## 2021-09-14 PROCEDURE — 3066F NEPHROPATHY DOC TX: CPT | Mod: CPTII,S$GLB,, | Performed by: FAMILY MEDICINE

## 2021-09-14 PROCEDURE — 3074F SYST BP LT 130 MM HG: CPT | Mod: CPTII,S$GLB,, | Performed by: FAMILY MEDICINE

## 2021-09-14 PROCEDURE — 99999 PR PBB SHADOW E&M-EST. PATIENT-LVL V: CPT | Mod: PBBFAC,,, | Performed by: FAMILY MEDICINE

## 2021-09-14 PROCEDURE — 1159F MED LIST DOCD IN RCRD: CPT | Mod: CPTII,S$GLB,, | Performed by: FAMILY MEDICINE

## 2021-09-14 PROCEDURE — 3066F PR DOCUMENTATION OF TREATMENT FOR NEPHROPATHY: ICD-10-PCS | Mod: CPTII,S$GLB,, | Performed by: FAMILY MEDICINE

## 2021-09-14 PROCEDURE — 1159F PR MEDICATION LIST DOCUMENTED IN MEDICAL RECORD: ICD-10-PCS | Mod: CPTII,S$GLB,, | Performed by: FAMILY MEDICINE

## 2021-09-14 PROCEDURE — 3046F PR MOST RECENT HEMOGLOBIN A1C LEVEL > 9.0%: ICD-10-PCS | Mod: CPTII,S$GLB,, | Performed by: FAMILY MEDICINE

## 2021-09-14 PROCEDURE — 99396 PREV VISIT EST AGE 40-64: CPT | Mod: S$GLB,,, | Performed by: FAMILY MEDICINE

## 2021-09-14 RX ORDER — INSULIN ASPART 100 [IU]/ML
10 INJECTION, SOLUTION INTRAVENOUS; SUBCUTANEOUS
Qty: 15 ML | Refills: 11 | Status: SHIPPED | OUTPATIENT
Start: 2021-09-14

## 2021-09-14 RX ORDER — INSULIN DEGLUDEC 200 U/ML
20 INJECTION, SOLUTION SUBCUTANEOUS NIGHTLY
Qty: 6 PEN | Refills: 4 | Status: ON HOLD | OUTPATIENT
Start: 2021-09-14 | End: 2022-02-17 | Stop reason: SDUPTHER

## 2021-09-14 RX ORDER — PREDNISONE 5 MG/1
5 TABLET ORAL DAILY
Qty: 90 TABLET | Refills: 4 | Status: SHIPPED | OUTPATIENT
Start: 2021-09-14 | End: 2022-09-27

## 2021-09-17 ENCOUNTER — LAB VISIT (OUTPATIENT)
Dept: FAMILY MEDICINE | Facility: CLINIC | Age: 61
End: 2021-09-17
Payer: MEDICARE

## 2021-09-17 DIAGNOSIS — Z01.812 PRE-PROCEDURE LAB EXAM: ICD-10-CM

## 2021-09-17 DIAGNOSIS — Z94.2 LUNG REPLACED BY TRANSPLANT: ICD-10-CM

## 2021-09-17 PROCEDURE — U0005 INFEC AGEN DETEC AMPLI PROBE: HCPCS | Performed by: INTERNAL MEDICINE

## 2021-09-17 PROCEDURE — U0003 INFECTIOUS AGENT DETECTION BY NUCLEIC ACID (DNA OR RNA); SEVERE ACUTE RESPIRATORY SYNDROME CORONAVIRUS 2 (SARS-COV-2) (CORONAVIRUS DISEASE [COVID-19]), AMPLIFIED PROBE TECHNIQUE, MAKING USE OF HIGH THROUGHPUT TECHNOLOGIES AS DESCRIBED BY CMS-2020-01-R: HCPCS | Performed by: INTERNAL MEDICINE

## 2021-09-18 LAB
SARS-COV-2 RNA RESP QL NAA+PROBE: NOT DETECTED
SARS-COV-2- CYCLE NUMBER: NORMAL

## 2021-09-20 ENCOUNTER — HOSPITAL ENCOUNTER (OUTPATIENT)
Dept: RADIOLOGY | Facility: HOSPITAL | Age: 61
Discharge: HOME | End: 2021-09-20
Attending: INTERNAL MEDICINE
Payer: MEDICARE

## 2021-09-20 ENCOUNTER — PATIENT MESSAGE (OUTPATIENT)
Dept: TRANSPLANT | Facility: CLINIC | Age: 61
End: 2021-09-20

## 2021-09-20 ENCOUNTER — HOSPITAL ENCOUNTER (OUTPATIENT)
Dept: PULMONOLOGY | Facility: HOSPITAL | Age: 61
Discharge: HOME | End: 2021-09-20
Attending: INTERNAL MEDICINE
Payer: MEDICARE

## 2021-09-20 ENCOUNTER — OFFICE VISIT (OUTPATIENT)
Dept: TRANSPLANT | Facility: CLINIC | Age: 61
End: 2021-09-20
Payer: MEDICARE

## 2021-09-20 ENCOUNTER — HOSPITAL ENCOUNTER (OUTPATIENT)
Dept: PULMONOLOGY | Facility: CLINIC | Age: 61
Discharge: HOME | End: 2021-09-20
Payer: MEDICARE

## 2021-09-20 VITALS
DIASTOLIC BLOOD PRESSURE: 77 MMHG | TEMPERATURE: 98 F | HEART RATE: 88 BPM | WEIGHT: 220 LBS | SYSTOLIC BLOOD PRESSURE: 126 MMHG | RESPIRATION RATE: 20 BRPM | HEIGHT: 70 IN | OXYGEN SATURATION: 96 % | HEIGHT: 70 IN | BODY MASS INDEX: 31.5 KG/M2 | BODY MASS INDEX: 31.88 KG/M2 | WEIGHT: 222.69 LBS

## 2021-09-20 DIAGNOSIS — Z94.2 LUNG REPLACED BY TRANSPLANT: ICD-10-CM

## 2021-09-20 DIAGNOSIS — Z79.4 TYPE 2 DIABETES MELLITUS WITH HYPERGLYCEMIA, WITH LONG-TERM CURRENT USE OF INSULIN: ICD-10-CM

## 2021-09-20 DIAGNOSIS — Z48.24 ENCOUNTER FOR AFTERCARE FOLLOWING LUNG TRANSPLANT: Primary | ICD-10-CM

## 2021-09-20 DIAGNOSIS — J96.11 CHRONIC RESPIRATORY FAILURE WITH HYPOXIA: ICD-10-CM

## 2021-09-20 DIAGNOSIS — D84.9 IMMUNOSUPPRESSION: ICD-10-CM

## 2021-09-20 DIAGNOSIS — Z79.2 PROPHYLACTIC ANTIBIOTIC: ICD-10-CM

## 2021-09-20 DIAGNOSIS — E11.65 TYPE 2 DIABETES MELLITUS WITH HYPERGLYCEMIA, WITH LONG-TERM CURRENT USE OF INSULIN: ICD-10-CM

## 2021-09-20 DIAGNOSIS — N18.6 ESRD (END STAGE RENAL DISEASE): ICD-10-CM

## 2021-09-20 LAB
FEF 25 75 LLN: 1.53
FEF 25 75 PRE REF: 22.2 %
FEF 25 75 REF: 3.06
FEV05 LLN: 1.71
FEV05 REF: 2.85
FEV1 FVC LLN: 66
FEV1 FVC PRE REF: 86.7 %
FEV1 FVC REF: 78
FEV1 LLN: 2.75
FEV1 PRE REF: 34.7 %
FEV1 REF: 3.63
FVC LLN: 3.59
FVC PRE REF: 39.9 %
FVC REF: 4.69
PEF LLN: 7.05
PEF PRE REF: 48.9 %
PEF REF: 9.36
PRE FEF 25 75: 0.68 L/S (ref 1.53–5.13)
PRE FET 100: 5.9 SEC
PRE FEV05 REF: 35.3 %
PRE FEV1 FVC: 67.31 % (ref 65.83–87.96)
PRE FEV1: 1.26 L (ref 2.75–4.46)
PRE FEV5: 1 L (ref 1.71–3.98)
PRE FVC: 1.87 L (ref 3.59–5.8)
PRE PEF: 4.58 L/S (ref 7.05–11.68)

## 2021-09-20 PROCEDURE — 99999 PR PBB SHADOW E&M-EST. PATIENT-LVL III: ICD-10-PCS | Mod: PBBFAC,,, | Performed by: INTERNAL MEDICINE

## 2021-09-20 PROCEDURE — 99999 PR PBB SHADOW E&M-EST. PATIENT-LVL III: CPT | Mod: PBBFAC,,, | Performed by: INTERNAL MEDICINE

## 2021-09-20 PROCEDURE — 94010 BREATHING CAPACITY TEST: CPT | Mod: 26,,, | Performed by: INTERNAL MEDICINE

## 2021-09-20 PROCEDURE — 1159F PR MEDICATION LIST DOCUMENTED IN MEDICAL RECORD: ICD-10-PCS | Mod: CPTII,S$GLB,, | Performed by: INTERNAL MEDICINE

## 2021-09-20 PROCEDURE — 3008F PR BODY MASS INDEX (BMI) DOCUMENTED: ICD-10-PCS | Mod: CPTII,S$GLB,, | Performed by: INTERNAL MEDICINE

## 2021-09-20 PROCEDURE — 4010F ACE/ARB THERAPY RXD/TAKEN: CPT | Mod: CPTII,S$GLB,, | Performed by: INTERNAL MEDICINE

## 2021-09-20 PROCEDURE — 3078F DIAST BP <80 MM HG: CPT | Mod: CPTII,S$GLB,, | Performed by: INTERNAL MEDICINE

## 2021-09-20 PROCEDURE — 3074F SYST BP LT 130 MM HG: CPT | Mod: CPTII,S$GLB,, | Performed by: INTERNAL MEDICINE

## 2021-09-20 PROCEDURE — 3066F NEPHROPATHY DOC TX: CPT | Mod: CPTII,S$GLB,, | Performed by: INTERNAL MEDICINE

## 2021-09-20 PROCEDURE — 4010F PR ACE/ARB THEARPY RXD/TAKEN: ICD-10-PCS | Mod: CPTII,S$GLB,, | Performed by: INTERNAL MEDICINE

## 2021-09-20 PROCEDURE — 71046 X-RAY EXAM CHEST 2 VIEWS: CPT | Mod: TC

## 2021-09-20 PROCEDURE — 3066F PR DOCUMENTATION OF TREATMENT FOR NEPHROPATHY: ICD-10-PCS | Mod: CPTII,S$GLB,, | Performed by: INTERNAL MEDICINE

## 2021-09-20 PROCEDURE — 94618 PULMONARY STRESS TESTING: ICD-10-PCS | Mod: S$GLB,,, | Performed by: INTERNAL MEDICINE

## 2021-09-20 PROCEDURE — 3078F PR MOST RECENT DIASTOLIC BLOOD PRESSURE < 80 MM HG: ICD-10-PCS | Mod: CPTII,S$GLB,, | Performed by: INTERNAL MEDICINE

## 2021-09-20 PROCEDURE — 3046F PR MOST RECENT HEMOGLOBIN A1C LEVEL > 9.0%: ICD-10-PCS | Mod: CPTII,S$GLB,, | Performed by: INTERNAL MEDICINE

## 2021-09-20 PROCEDURE — 3074F PR MOST RECENT SYSTOLIC BLOOD PRESSURE < 130 MM HG: ICD-10-PCS | Mod: CPTII,S$GLB,, | Performed by: INTERNAL MEDICINE

## 2021-09-20 PROCEDURE — 71046 XR CHEST PA AND LATERAL: ICD-10-PCS | Mod: 26,,, | Performed by: RADIOLOGY

## 2021-09-20 PROCEDURE — 71046 X-RAY EXAM CHEST 2 VIEWS: CPT | Mod: 26,,, | Performed by: RADIOLOGY

## 2021-09-20 PROCEDURE — 1159F MED LIST DOCD IN RCRD: CPT | Mod: CPTII,S$GLB,, | Performed by: INTERNAL MEDICINE

## 2021-09-20 PROCEDURE — 1160F PR REVIEW ALL MEDS BY PRESCRIBER/CLIN PHARMACIST DOCUMENTED: ICD-10-PCS | Mod: CPTII,S$GLB,, | Performed by: INTERNAL MEDICINE

## 2021-09-20 PROCEDURE — 99215 PR OFFICE/OUTPT VISIT, EST, LEVL V, 40-54 MIN: ICD-10-PCS | Mod: S$GLB,,, | Performed by: INTERNAL MEDICINE

## 2021-09-20 PROCEDURE — 3008F BODY MASS INDEX DOCD: CPT | Mod: CPTII,S$GLB,, | Performed by: INTERNAL MEDICINE

## 2021-09-20 PROCEDURE — 3046F HEMOGLOBIN A1C LEVEL >9.0%: CPT | Mod: CPTII,S$GLB,, | Performed by: INTERNAL MEDICINE

## 2021-09-20 PROCEDURE — 94010 BREATHING CAPACITY TEST: ICD-10-PCS | Mod: 26,,, | Performed by: INTERNAL MEDICINE

## 2021-09-20 PROCEDURE — 1160F RVW MEDS BY RX/DR IN RCRD: CPT | Mod: CPTII,S$GLB,, | Performed by: INTERNAL MEDICINE

## 2021-09-20 PROCEDURE — 94618 PULMONARY STRESS TESTING: CPT | Mod: S$GLB,,, | Performed by: INTERNAL MEDICINE

## 2021-09-20 PROCEDURE — 99215 OFFICE O/P EST HI 40 MIN: CPT | Mod: S$GLB,,, | Performed by: INTERNAL MEDICINE

## 2021-09-22 ENCOUNTER — TELEPHONE (OUTPATIENT)
Dept: VASCULAR SURGERY | Facility: CLINIC | Age: 61
End: 2021-09-22

## 2021-09-22 DIAGNOSIS — Z01.818 PRE-OP EVALUATION: Primary | ICD-10-CM

## 2021-09-23 ENCOUNTER — TELEPHONE (OUTPATIENT)
Dept: FAMILY MEDICINE | Facility: CLINIC | Age: 61
End: 2021-09-23

## 2021-09-28 RX ORDER — CINACALCET 30 MG/1
TABLET, FILM COATED ORAL
Qty: 30 TABLET | Refills: 11 | Status: SHIPPED | OUTPATIENT
Start: 2021-09-28

## 2021-09-30 ENCOUNTER — TELEPHONE (OUTPATIENT)
Dept: TRANSPLANT | Facility: CLINIC | Age: 61
End: 2021-09-30

## 2021-10-01 ENCOUNTER — DOCUMENTATION ONLY (OUTPATIENT)
Dept: TRANSPLANT | Facility: CLINIC | Age: 61
End: 2021-10-01

## 2021-10-05 ENCOUNTER — TELEPHONE (OUTPATIENT)
Dept: TRANSPLANT | Facility: CLINIC | Age: 61
End: 2021-10-05

## 2021-10-05 DIAGNOSIS — Z94.2 LUNG REPLACED BY TRANSPLANT: Primary | ICD-10-CM

## 2021-10-06 ENCOUNTER — HOSPITAL ENCOUNTER (OUTPATIENT)
Dept: RADIOLOGY | Facility: HOSPITAL | Age: 61
Discharge: HOME | End: 2021-10-06
Attending: INTERNAL MEDICINE
Payer: MEDICARE

## 2021-10-06 ENCOUNTER — HOSPITAL ENCOUNTER (OUTPATIENT)
Dept: PULMONOLOGY | Facility: HOSPITAL | Age: 61
Discharge: HOME | End: 2021-10-06
Attending: INTERNAL MEDICINE
Payer: MEDICARE

## 2021-10-06 ENCOUNTER — OFFICE VISIT (OUTPATIENT)
Dept: TRANSPLANT | Facility: CLINIC | Age: 61
End: 2021-10-06
Payer: MEDICARE

## 2021-10-06 ENCOUNTER — HOSPITAL ENCOUNTER (OUTPATIENT)
Dept: PULMONOLOGY | Facility: CLINIC | Age: 61
Discharge: HOME | End: 2021-10-06
Payer: MEDICARE

## 2021-10-06 VITALS
SYSTOLIC BLOOD PRESSURE: 137 MMHG | HEIGHT: 70 IN | HEART RATE: 93 BPM | TEMPERATURE: 99 F | RESPIRATION RATE: 20 BRPM | OXYGEN SATURATION: 95 % | DIASTOLIC BLOOD PRESSURE: 76 MMHG | WEIGHT: 218.25 LBS | BODY MASS INDEX: 31.25 KG/M2

## 2021-10-06 DIAGNOSIS — N18.6 ESRD (END STAGE RENAL DISEASE): ICD-10-CM

## 2021-10-06 DIAGNOSIS — J22 ACUTE RESPIRATORY INFECTION: ICD-10-CM

## 2021-10-06 DIAGNOSIS — Z94.2 LUNG REPLACED BY TRANSPLANT: ICD-10-CM

## 2021-10-06 DIAGNOSIS — Z13.9 SCREENING PROCEDURE: Primary | ICD-10-CM

## 2021-10-06 DIAGNOSIS — Z79.2 PROPHYLACTIC ANTIBIOTIC: ICD-10-CM

## 2021-10-06 DIAGNOSIS — Z94.2 LUNG REPLACED BY TRANSPLANT: Primary | ICD-10-CM

## 2021-10-06 DIAGNOSIS — Z48.24 ENCOUNTER FOR AFTERCARE FOLLOWING LUNG TRANSPLANT: ICD-10-CM

## 2021-10-06 LAB — SARS-COV-2 RDRP RESP QL NAA+PROBE: NEGATIVE

## 2021-10-06 PROCEDURE — 1159F MED LIST DOCD IN RCRD: CPT | Mod: CPTII,S$GLB,, | Performed by: INTERNAL MEDICINE

## 2021-10-06 PROCEDURE — 4010F ACE/ARB THERAPY RXD/TAKEN: CPT | Mod: CPTII,S$GLB,, | Performed by: INTERNAL MEDICINE

## 2021-10-06 PROCEDURE — 94010 BREATHING CAPACITY TEST: CPT | Mod: 26,,, | Performed by: INTERNAL MEDICINE

## 2021-10-06 PROCEDURE — 87205 SMEAR GRAM STAIN: CPT | Performed by: INTERNAL MEDICINE

## 2021-10-06 PROCEDURE — 3008F PR BODY MASS INDEX (BMI) DOCUMENTED: ICD-10-PCS | Mod: CPTII,S$GLB,, | Performed by: INTERNAL MEDICINE

## 2021-10-06 PROCEDURE — 99214 PR OFFICE/OUTPT VISIT, EST, LEVL IV, 30-39 MIN: ICD-10-PCS | Mod: 25,S$GLB,, | Performed by: INTERNAL MEDICINE

## 2021-10-06 PROCEDURE — 94010 BREATHING CAPACITY TEST: ICD-10-PCS | Mod: 26,,, | Performed by: INTERNAL MEDICINE

## 2021-10-06 PROCEDURE — 3046F HEMOGLOBIN A1C LEVEL >9.0%: CPT | Mod: CPTII,S$GLB,, | Performed by: INTERNAL MEDICINE

## 2021-10-06 PROCEDURE — 3008F BODY MASS INDEX DOCD: CPT | Mod: CPTII,S$GLB,, | Performed by: INTERNAL MEDICINE

## 2021-10-06 PROCEDURE — 99999 PR PBB SHADOW E&M-EST. PATIENT-LVL V: ICD-10-PCS | Mod: PBBFAC,,, | Performed by: INTERNAL MEDICINE

## 2021-10-06 PROCEDURE — 3078F PR MOST RECENT DIASTOLIC BLOOD PRESSURE < 80 MM HG: ICD-10-PCS | Mod: CPTII,S$GLB,, | Performed by: INTERNAL MEDICINE

## 2021-10-06 PROCEDURE — 87070 CULTURE OTHR SPECIMN AEROBIC: CPT | Performed by: INTERNAL MEDICINE

## 2021-10-06 PROCEDURE — 71046 X-RAY EXAM CHEST 2 VIEWS: CPT | Mod: TC

## 2021-10-06 PROCEDURE — U0002 COVID-19 LAB TEST NON-CDC: HCPCS | Performed by: INTERNAL MEDICINE

## 2021-10-06 PROCEDURE — 3066F NEPHROPATHY DOC TX: CPT | Mod: CPTII,S$GLB,, | Performed by: INTERNAL MEDICINE

## 2021-10-06 PROCEDURE — 71046 X-RAY EXAM CHEST 2 VIEWS: CPT | Mod: 26,,, | Performed by: RADIOLOGY

## 2021-10-06 PROCEDURE — 99999 PR PBB SHADOW E&M-EST. PATIENT-LVL V: CPT | Mod: PBBFAC,,, | Performed by: INTERNAL MEDICINE

## 2021-10-06 PROCEDURE — 99214 OFFICE O/P EST MOD 30 MIN: CPT | Mod: 25,S$GLB,, | Performed by: INTERNAL MEDICINE

## 2021-10-06 PROCEDURE — 3046F PR MOST RECENT HEMOGLOBIN A1C LEVEL > 9.0%: ICD-10-PCS | Mod: CPTII,S$GLB,, | Performed by: INTERNAL MEDICINE

## 2021-10-06 PROCEDURE — 4010F PR ACE/ARB THEARPY RXD/TAKEN: ICD-10-PCS | Mod: CPTII,S$GLB,, | Performed by: INTERNAL MEDICINE

## 2021-10-06 PROCEDURE — 1159F PR MEDICATION LIST DOCUMENTED IN MEDICAL RECORD: ICD-10-PCS | Mod: CPTII,S$GLB,, | Performed by: INTERNAL MEDICINE

## 2021-10-06 PROCEDURE — 3075F SYST BP GE 130 - 139MM HG: CPT | Mod: CPTII,S$GLB,, | Performed by: INTERNAL MEDICINE

## 2021-10-06 PROCEDURE — 71046 XR CHEST PA AND LATERAL: ICD-10-PCS | Mod: 26,,, | Performed by: RADIOLOGY

## 2021-10-06 PROCEDURE — 3066F PR DOCUMENTATION OF TREATMENT FOR NEPHROPATHY: ICD-10-PCS | Mod: CPTII,S$GLB,, | Performed by: INTERNAL MEDICINE

## 2021-10-06 PROCEDURE — 3075F PR MOST RECENT SYSTOLIC BLOOD PRESS GE 130-139MM HG: ICD-10-PCS | Mod: CPTII,S$GLB,, | Performed by: INTERNAL MEDICINE

## 2021-10-06 PROCEDURE — 3078F DIAST BP <80 MM HG: CPT | Mod: CPTII,S$GLB,, | Performed by: INTERNAL MEDICINE

## 2021-10-07 ENCOUNTER — PATIENT MESSAGE (OUTPATIENT)
Dept: TRANSPLANT | Facility: CLINIC | Age: 61
End: 2021-10-07

## 2021-10-07 ENCOUNTER — TELEPHONE (OUTPATIENT)
Dept: TRANSPLANT | Facility: CLINIC | Age: 61
End: 2021-10-07

## 2021-10-07 LAB
FEF 25 75 LLN: 1.52
FEF 25 75 PRE REF: 20.4 %
FEF 25 75 REF: 3.06
FEV05 LLN: 1.71
FEV05 REF: 2.85
FEV1 FVC LLN: 66
FEV1 FVC PRE REF: 87.1 %
FEV1 FVC REF: 78
FEV1 LLN: 2.75
FEV1 PRE REF: 30.9 %
FEV1 REF: 3.63
FVC LLN: 3.59
FVC PRE REF: 35.3 %
FVC REF: 4.69
PEF LLN: 7.05
PEF PRE REF: 36 %
PEF REF: 9.36
PRE FEF 25 75: 0.62 L/S (ref 1.52–5.13)
PRE FET 100: 6.08 SEC
PRE FEV05 REF: 31.4 %
PRE FEV1 FVC: 67.65 % (ref 65.82–87.96)
PRE FEV1: 1.12 L (ref 2.75–4.46)
PRE FEV5: 0.89 L (ref 1.71–3.98)
PRE FVC: 1.66 L (ref 3.59–5.8)
PRE PEF: 3.37 L/S (ref 7.05–11.68)

## 2021-10-09 LAB
BACTERIA SPEC AEROBE CULT: NORMAL
BACTERIA SPEC AEROBE CULT: NORMAL
GRAM STN SPEC: NORMAL

## 2021-10-10 ENCOUNTER — LAB VISIT (OUTPATIENT)
Dept: SPORTS MEDICINE | Facility: CLINIC | Age: 61
End: 2021-10-10
Attending: SURGERY
Payer: MEDICARE

## 2021-10-10 DIAGNOSIS — Z01.818 PRE-OP EVALUATION: ICD-10-CM

## 2021-10-10 PROCEDURE — U0003 INFECTIOUS AGENT DETECTION BY NUCLEIC ACID (DNA OR RNA); SEVERE ACUTE RESPIRATORY SYNDROME CORONAVIRUS 2 (SARS-COV-2) (CORONAVIRUS DISEASE [COVID-19]), AMPLIFIED PROBE TECHNIQUE, MAKING USE OF HIGH THROUGHPUT TECHNOLOGIES AS DESCRIBED BY CMS-2020-01-R: HCPCS | Performed by: SURGERY

## 2021-10-10 PROCEDURE — U0005 INFEC AGEN DETEC AMPLI PROBE: HCPCS | Performed by: SURGERY

## 2021-10-11 LAB
SARS-COV-2 RNA RESP QL NAA+PROBE: NOT DETECTED
SARS-COV-2- CYCLE NUMBER: NORMAL

## 2021-10-13 ENCOUNTER — HOSPITAL ENCOUNTER (OUTPATIENT)
Facility: HOSPITAL | Age: 61
Discharge: HOME | End: 2021-10-13
Attending: SURGERY | Admitting: SURGERY
Payer: MEDICARE

## 2021-10-13 ENCOUNTER — ANESTHESIA (OUTPATIENT)
Dept: SURGERY | Facility: HOSPITAL | Age: 61
End: 2021-10-13
Payer: MEDICARE

## 2021-10-13 ENCOUNTER — ANESTHESIA EVENT (OUTPATIENT)
Dept: SURGERY | Facility: HOSPITAL | Age: 61
End: 2021-10-13
Payer: MEDICARE

## 2021-10-13 VITALS
SYSTOLIC BLOOD PRESSURE: 130 MMHG | BODY MASS INDEX: 30.8 KG/M2 | HEIGHT: 71 IN | DIASTOLIC BLOOD PRESSURE: 80 MMHG | TEMPERATURE: 99 F | OXYGEN SATURATION: 100 % | RESPIRATION RATE: 20 BRPM | HEART RATE: 74 BPM | WEIGHT: 220 LBS

## 2021-10-13 DIAGNOSIS — N18.6 ESRD (END STAGE RENAL DISEASE): ICD-10-CM

## 2021-10-13 DIAGNOSIS — N18.6 END-STAGE RENAL DISEASE: Primary | Chronic | ICD-10-CM

## 2021-10-13 LAB — POCT GLUCOSE: 187 MG/DL (ref 70–110)

## 2021-10-13 PROCEDURE — 82962 GLUCOSE BLOOD TEST: CPT | Mod: 91 | Performed by: SURGERY

## 2021-10-13 PROCEDURE — 63600175 PHARM REV CODE 636 W HCPCS: Performed by: STUDENT IN AN ORGANIZED HEALTH CARE EDUCATION/TRAINING PROGRAM

## 2021-10-13 PROCEDURE — 63600175 PHARM REV CODE 636 W HCPCS: Performed by: SURGERY

## 2021-10-13 PROCEDURE — 71000015 HC POSTOP RECOV 1ST HR: Performed by: SURGERY

## 2021-10-13 PROCEDURE — 36821 AV FUSION DIRECT ANY SITE: CPT | Mod: ,,, | Performed by: SURGERY

## 2021-10-13 PROCEDURE — 36821 PR ANASTOMOSIS,AV,ANY SITE: ICD-10-PCS | Mod: ,,, | Performed by: SURGERY

## 2021-10-13 PROCEDURE — 82962 GLUCOSE BLOOD TEST: CPT | Performed by: SURGERY

## 2021-10-13 PROCEDURE — D9220A PRA ANESTHESIA: Mod: ,,, | Performed by: ANESTHESIOLOGY

## 2021-10-13 PROCEDURE — 71000044 HC DOSC ROUTINE RECOVERY FIRST HOUR: Performed by: SURGERY

## 2021-10-13 PROCEDURE — 36000706: Performed by: SURGERY

## 2021-10-13 PROCEDURE — 25000003 PHARM REV CODE 250: Performed by: STUDENT IN AN ORGANIZED HEALTH CARE EDUCATION/TRAINING PROGRAM

## 2021-10-13 PROCEDURE — 36000707: Performed by: SURGERY

## 2021-10-13 PROCEDURE — 37000008 HC ANESTHESIA 1ST 15 MINUTES: Performed by: SURGERY

## 2021-10-13 PROCEDURE — D9220A PRA ANESTHESIA: ICD-10-PCS | Mod: ,,, | Performed by: ANESTHESIOLOGY

## 2021-10-13 PROCEDURE — 25000003 PHARM REV CODE 250: Performed by: SURGERY

## 2021-10-13 PROCEDURE — 37000009 HC ANESTHESIA EA ADD 15 MINS: Performed by: SURGERY

## 2021-10-13 RX ORDER — MIDAZOLAM HYDROCHLORIDE 1 MG/ML
INJECTION, SOLUTION INTRAMUSCULAR; INTRAVENOUS
Status: DISCONTINUED | OUTPATIENT
Start: 2021-10-13 | End: 2021-10-13

## 2021-10-13 RX ORDER — ONDANSETRON 2 MG/ML
INJECTION INTRAMUSCULAR; INTRAVENOUS
Status: DISCONTINUED | OUTPATIENT
Start: 2021-10-13 | End: 2021-10-13

## 2021-10-13 RX ORDER — FENTANYL CITRATE 50 UG/ML
INJECTION, SOLUTION INTRAMUSCULAR; INTRAVENOUS
Status: DISCONTINUED | OUTPATIENT
Start: 2021-10-13 | End: 2021-10-13

## 2021-10-13 RX ORDER — HYDROCODONE BITARTRATE AND ACETAMINOPHEN 5; 325 MG/1; MG/1
1 TABLET ORAL EVERY 6 HOURS PRN
Qty: 20 TABLET | Refills: 0 | Status: SHIPPED | OUTPATIENT
Start: 2021-10-13 | End: 2021-10-18

## 2021-10-13 RX ORDER — DEXMEDETOMIDINE HYDROCHLORIDE 100 UG/ML
INJECTION, SOLUTION INTRAVENOUS
Status: DISCONTINUED | OUTPATIENT
Start: 2021-10-13 | End: 2021-10-13

## 2021-10-13 RX ORDER — CEFAZOLIN SODIUM 1 G/3ML
2 INJECTION, POWDER, FOR SOLUTION INTRAMUSCULAR; INTRAVENOUS
Status: COMPLETED | OUTPATIENT
Start: 2021-10-13 | End: 2021-10-13

## 2021-10-13 RX ORDER — SODIUM CHLORIDE 0.9 % (FLUSH) 0.9 %
10 SYRINGE (ML) INJECTION
Status: DISCONTINUED | OUTPATIENT
Start: 2021-10-13 | End: 2021-10-13 | Stop reason: HOSPADM

## 2021-10-13 RX ORDER — HEPARIN SODIUM 1000 [USP'U]/ML
INJECTION, SOLUTION INTRAVENOUS; SUBCUTANEOUS
Status: DISCONTINUED | OUTPATIENT
Start: 2021-10-13 | End: 2021-10-13 | Stop reason: HOSPADM

## 2021-10-13 RX ORDER — PROPOFOL 10 MG/ML
VIAL (ML) INTRAVENOUS
Status: DISCONTINUED | OUTPATIENT
Start: 2021-10-13 | End: 2021-10-13

## 2021-10-13 RX ORDER — HEPARIN SODIUM 1000 [USP'U]/ML
INJECTION, SOLUTION INTRAVENOUS; SUBCUTANEOUS
Status: DISCONTINUED | OUTPATIENT
Start: 2021-10-13 | End: 2021-10-13

## 2021-10-13 RX ORDER — HYDROCODONE BITARTRATE AND ACETAMINOPHEN 5; 325 MG/1; MG/1
1 TABLET ORAL EVERY 4 HOURS PRN
Status: DISCONTINUED | OUTPATIENT
Start: 2021-10-13 | End: 2021-10-13 | Stop reason: HOSPADM

## 2021-10-13 RX ADMIN — CEFAZOLIN 2 G: 330 INJECTION, POWDER, FOR SOLUTION INTRAMUSCULAR; INTRAVENOUS at 01:10

## 2021-10-13 RX ADMIN — MEPIVACAINE HYDROCHLORIDE 40 ML: 15 INJECTION, SOLUTION EPIDURAL; INFILTRATION at 01:10

## 2021-10-13 RX ADMIN — MIDAZOLAM HYDROCHLORIDE 2 MG: 1 INJECTION, SOLUTION INTRAMUSCULAR; INTRAVENOUS at 01:10

## 2021-10-13 RX ADMIN — DEXMEDETOMIDINE HYDROCHLORIDE 4 MCG: 100 INJECTION, SOLUTION INTRAVENOUS at 01:10

## 2021-10-13 RX ADMIN — FENTANYL CITRATE 25 MCG: 50 INJECTION INTRAMUSCULAR; INTRAVENOUS at 01:10

## 2021-10-13 RX ADMIN — Medication 30 MCG/KG/MIN: at 01:10

## 2021-10-13 RX ADMIN — SODIUM CHLORIDE: 0.9 INJECTION, SOLUTION INTRAVENOUS at 01:10

## 2021-10-13 RX ADMIN — HEPARIN SODIUM 3000 UNITS: 1000 INJECTION, SOLUTION INTRAVENOUS; SUBCUTANEOUS at 02:10

## 2021-10-13 RX ADMIN — DEXMEDETOMIDINE HYDROCHLORIDE 4 MCG: 100 INJECTION, SOLUTION INTRAVENOUS at 02:10

## 2021-10-13 RX ADMIN — PROPOFOL 50 MCG/KG/MIN: 10 INJECTION, EMULSION INTRAVENOUS at 01:10

## 2021-10-13 RX ADMIN — ONDANSETRON 4 MG: 2 INJECTION INTRAMUSCULAR; INTRAVENOUS at 01:10

## 2021-10-13 RX ADMIN — Medication 25 MCG/KG/MIN: at 02:10

## 2021-10-13 RX ADMIN — PROPOFOL 20 MG: 10 INJECTION, EMULSION INTRAVENOUS at 01:10

## 2021-10-14 DIAGNOSIS — Z94.2 LUNG REPLACED BY TRANSPLANT: Primary | ICD-10-CM

## 2021-10-14 DIAGNOSIS — Z01.812 PRE-PROCEDURE LAB EXAM: ICD-10-CM

## 2021-10-14 DIAGNOSIS — R91.1 SOLITARY PULMONARY NODULE: ICD-10-CM

## 2021-10-14 LAB — POCT GLUCOSE: 261 MG/DL (ref 70–110)

## 2021-10-15 DIAGNOSIS — E83.42 HYPOMAGNESEMIA: ICD-10-CM

## 2021-10-15 RX ORDER — ROSUVASTATIN CALCIUM 10 MG/1
10 TABLET, COATED ORAL DAILY
Qty: 30 TABLET | Refills: 3 | Status: ON HOLD | OUTPATIENT
Start: 2021-10-15 | End: 2022-02-09 | Stop reason: SDUPTHER

## 2021-10-22 ENCOUNTER — TELEPHONE (OUTPATIENT)
Dept: VASCULAR SURGERY | Facility: CLINIC | Age: 61
End: 2021-10-22

## 2021-10-25 ENCOUNTER — OFFICE VISIT (OUTPATIENT)
Dept: VASCULAR SURGERY | Facility: CLINIC | Age: 61
End: 2021-10-25
Attending: SURGERY
Payer: MEDICARE

## 2021-10-25 VITALS
HEIGHT: 71 IN | WEIGHT: 220.44 LBS | DIASTOLIC BLOOD PRESSURE: 83 MMHG | BODY MASS INDEX: 30.86 KG/M2 | HEART RATE: 97 BPM | TEMPERATURE: 99 F | SYSTOLIC BLOOD PRESSURE: 152 MMHG

## 2021-10-25 DIAGNOSIS — N18.6 ESRD (END STAGE RENAL DISEASE): Primary | ICD-10-CM

## 2021-10-25 DIAGNOSIS — Z99.2 ARTERIOVENOUS FISTULA FOR HEMODIALYSIS IN PLACE, PRIMARY: Primary | ICD-10-CM

## 2021-10-25 PROCEDURE — 4010F PR ACE/ARB THEARPY RXD/TAKEN: ICD-10-PCS | Mod: CPTII,S$GLB,, | Performed by: SURGERY

## 2021-10-25 PROCEDURE — 1159F MED LIST DOCD IN RCRD: CPT | Mod: CPTII,S$GLB,, | Performed by: SURGERY

## 2021-10-25 PROCEDURE — 3079F DIAST BP 80-89 MM HG: CPT | Mod: CPTII,S$GLB,, | Performed by: SURGERY

## 2021-10-25 PROCEDURE — 3077F PR MOST RECENT SYSTOLIC BLOOD PRESSURE >= 140 MM HG: ICD-10-PCS | Mod: CPTII,S$GLB,, | Performed by: SURGERY

## 2021-10-25 PROCEDURE — 99024 PR POST-OP FOLLOW-UP VISIT: ICD-10-PCS | Mod: S$GLB,,, | Performed by: SURGERY

## 2021-10-25 PROCEDURE — 3079F PR MOST RECENT DIASTOLIC BLOOD PRESSURE 80-89 MM HG: ICD-10-PCS | Mod: CPTII,S$GLB,, | Performed by: SURGERY

## 2021-10-25 PROCEDURE — 1159F PR MEDICATION LIST DOCUMENTED IN MEDICAL RECORD: ICD-10-PCS | Mod: CPTII,S$GLB,, | Performed by: SURGERY

## 2021-10-25 PROCEDURE — 99999 PR PBB SHADOW E&M-EST. PATIENT-LVL IV: ICD-10-PCS | Mod: PBBFAC,,, | Performed by: SURGERY

## 2021-10-25 PROCEDURE — 99024 POSTOP FOLLOW-UP VISIT: CPT | Mod: S$GLB,,, | Performed by: SURGERY

## 2021-10-25 PROCEDURE — 4010F ACE/ARB THERAPY RXD/TAKEN: CPT | Mod: CPTII,S$GLB,, | Performed by: SURGERY

## 2021-10-25 PROCEDURE — 3046F HEMOGLOBIN A1C LEVEL >9.0%: CPT | Mod: CPTII,S$GLB,, | Performed by: SURGERY

## 2021-10-25 PROCEDURE — 3066F NEPHROPATHY DOC TX: CPT | Mod: CPTII,S$GLB,, | Performed by: SURGERY

## 2021-10-25 PROCEDURE — 3008F PR BODY MASS INDEX (BMI) DOCUMENTED: ICD-10-PCS | Mod: CPTII,S$GLB,, | Performed by: SURGERY

## 2021-10-25 PROCEDURE — 99999 PR PBB SHADOW E&M-EST. PATIENT-LVL IV: CPT | Mod: PBBFAC,,, | Performed by: SURGERY

## 2021-10-25 PROCEDURE — 3077F SYST BP >= 140 MM HG: CPT | Mod: CPTII,S$GLB,, | Performed by: SURGERY

## 2021-10-25 PROCEDURE — 3066F PR DOCUMENTATION OF TREATMENT FOR NEPHROPATHY: ICD-10-PCS | Mod: CPTII,S$GLB,, | Performed by: SURGERY

## 2021-10-25 PROCEDURE — 3008F BODY MASS INDEX DOCD: CPT | Mod: CPTII,S$GLB,, | Performed by: SURGERY

## 2021-10-25 PROCEDURE — 3046F PR MOST RECENT HEMOGLOBIN A1C LEVEL > 9.0%: ICD-10-PCS | Mod: CPTII,S$GLB,, | Performed by: SURGERY

## 2021-10-27 ENCOUNTER — TELEPHONE (OUTPATIENT)
Dept: TRANSPLANT | Facility: CLINIC | Age: 61
End: 2021-10-27
Payer: MEDICARE

## 2021-11-04 ENCOUNTER — PATIENT OUTREACH (OUTPATIENT)
Dept: ADMINISTRATIVE | Facility: OTHER | Age: 61
End: 2021-11-04
Payer: MEDICARE

## 2021-11-04 RX ORDER — SEVELAMER CARBONATE 800 MG/1
TABLET, FILM COATED ORAL
Qty: 180 TABLET | Refills: 1 | Status: SHIPPED | OUTPATIENT
Start: 2021-11-04 | End: 2021-12-01

## 2021-11-08 ENCOUNTER — OFFICE VISIT (OUTPATIENT)
Dept: TRANSPLANT | Facility: CLINIC | Age: 61
End: 2021-11-08
Payer: MEDICARE

## 2021-11-08 ENCOUNTER — OFFICE VISIT (OUTPATIENT)
Dept: VASCULAR SURGERY | Facility: CLINIC | Age: 61
End: 2021-11-08
Attending: SURGERY
Payer: MEDICARE

## 2021-11-08 ENCOUNTER — HOSPITAL ENCOUNTER (OUTPATIENT)
Dept: RADIOLOGY | Facility: HOSPITAL | Age: 61
Discharge: HOME | End: 2021-11-08
Attending: INTERNAL MEDICINE
Payer: MEDICARE

## 2021-11-08 ENCOUNTER — HOSPITAL ENCOUNTER (OUTPATIENT)
Dept: VASCULAR SURGERY | Facility: CLINIC | Age: 61
Discharge: HOME | End: 2021-11-08
Attending: SURGERY
Payer: MEDICARE

## 2021-11-08 ENCOUNTER — HOSPITAL ENCOUNTER (OUTPATIENT)
Dept: PULMONOLOGY | Facility: HOSPITAL | Age: 61
Discharge: HOME | End: 2021-11-08
Attending: INTERNAL MEDICINE
Payer: MEDICARE

## 2021-11-08 VITALS
HEIGHT: 70 IN | TEMPERATURE: 97 F | BODY MASS INDEX: 31.25 KG/M2 | SYSTOLIC BLOOD PRESSURE: 169 MMHG | OXYGEN SATURATION: 98 % | WEIGHT: 218.25 LBS | DIASTOLIC BLOOD PRESSURE: 92 MMHG | HEART RATE: 97 BPM | RESPIRATION RATE: 18 BRPM

## 2021-11-08 VITALS
BODY MASS INDEX: 30.56 KG/M2 | SYSTOLIC BLOOD PRESSURE: 155 MMHG | HEIGHT: 71 IN | WEIGHT: 218.25 LBS | TEMPERATURE: 98 F | DIASTOLIC BLOOD PRESSURE: 89 MMHG | HEART RATE: 96 BPM

## 2021-11-08 DIAGNOSIS — J18.1 PULMONARY CONSOLIDATION DETERMINED BY EXAMINATION: ICD-10-CM

## 2021-11-08 DIAGNOSIS — R91.1 SOLITARY PULMONARY NODULE: ICD-10-CM

## 2021-11-08 DIAGNOSIS — Z94.2 LUNG REPLACED BY TRANSPLANT: ICD-10-CM

## 2021-11-08 DIAGNOSIS — Z48.24 ENCOUNTER FOR AFTERCARE FOLLOWING LUNG TRANSPLANT: ICD-10-CM

## 2021-11-08 DIAGNOSIS — J18.1 PULMONARY CONSOLIDATION DETERMINED BY EXAMINATION: Primary | ICD-10-CM

## 2021-11-08 DIAGNOSIS — N18.6 ESRD ON HEMODIALYSIS: ICD-10-CM

## 2021-11-08 DIAGNOSIS — K21.9 GASTROESOPHAGEAL REFLUX DISEASE, UNSPECIFIED WHETHER ESOPHAGITIS PRESENT: Primary | ICD-10-CM

## 2021-11-08 DIAGNOSIS — T86.818 OTHER COMPLICATION OF LUNG TRANSPLANT: ICD-10-CM

## 2021-11-08 DIAGNOSIS — N18.6 ESRD (END STAGE RENAL DISEASE): Primary | ICD-10-CM

## 2021-11-08 DIAGNOSIS — N18.6 ESRD (END STAGE RENAL DISEASE): ICD-10-CM

## 2021-11-08 DIAGNOSIS — Z99.2 ARTERIOVENOUS FISTULA FOR HEMODIALYSIS IN PLACE, PRIMARY: Primary | ICD-10-CM

## 2021-11-08 DIAGNOSIS — Z79.2 PROPHYLACTIC ANTIBIOTIC: ICD-10-CM

## 2021-11-08 DIAGNOSIS — Z99.2 ESRD ON HEMODIALYSIS: ICD-10-CM

## 2021-11-08 DIAGNOSIS — T86.810 REJECTION OF LUNG TRANSPLANT: ICD-10-CM

## 2021-11-08 DIAGNOSIS — T86.819 COMPLICATION OF LUNG TRANSPLANT: ICD-10-CM

## 2021-11-08 DIAGNOSIS — D84.9 IMMUNOSUPPRESSION: ICD-10-CM

## 2021-11-08 LAB
FEF 25 75 LLN: 1.52
FEF 25 75 PRE REF: 17.2 %
FEF 25 75 REF: 3.05
FEV05 LLN: 1.71
FEV05 REF: 2.85
FEV1 FVC LLN: 66
FEV1 FVC PRE REF: 83.1 %
FEV1 FVC REF: 78
FEV1 LLN: 2.75
FEV1 PRE REF: 29.3 %
FEV1 REF: 3.63
FVC LLN: 3.58
FVC PRE REF: 35.2 %
FVC REF: 4.68
PEF LLN: 7.05
PEF PRE REF: 42.5 %
PEF REF: 9.36
PRE FEF 25 75: 0.53 L/S (ref 1.52–5.12)
PRE FET 100: 6.47 SEC
PRE FEV05 REF: 29.4 %
PRE FEV1 FVC: 64.51 % (ref 65.79–87.96)
PRE FEV1: 1.06 L (ref 2.75–4.46)
PRE FEV5: 0.84 L (ref 1.71–3.98)
PRE FVC: 1.65 L (ref 3.58–5.8)
PRE PEF: 3.98 L/S (ref 7.05–11.68)

## 2021-11-08 PROCEDURE — 71250 CT THORAX DX C-: CPT | Mod: TC

## 2021-11-08 PROCEDURE — 4010F PR ACE/ARB THEARPY RXD/TAKEN: ICD-10-PCS | Mod: CPTII,S$GLB,, | Performed by: INTERNAL MEDICINE

## 2021-11-08 PROCEDURE — 99024 PR POST-OP FOLLOW-UP VISIT: ICD-10-PCS | Mod: S$GLB,,, | Performed by: SURGERY

## 2021-11-08 PROCEDURE — 4010F PR ACE/ARB THEARPY RXD/TAKEN: ICD-10-PCS | Mod: CPTII,S$GLB,, | Performed by: SURGERY

## 2021-11-08 PROCEDURE — 93990 PR DUPLEX HEMODIALYSIS ACCESS: ICD-10-PCS | Mod: S$GLB,,, | Performed by: SURGERY

## 2021-11-08 PROCEDURE — 4010F ACE/ARB THERAPY RXD/TAKEN: CPT | Mod: CPTII,S$GLB,, | Performed by: INTERNAL MEDICINE

## 2021-11-08 PROCEDURE — 3046F HEMOGLOBIN A1C LEVEL >9.0%: CPT | Mod: CPTII,S$GLB,, | Performed by: SURGERY

## 2021-11-08 PROCEDURE — 3079F DIAST BP 80-89 MM HG: CPT | Mod: CPTII,S$GLB,, | Performed by: SURGERY

## 2021-11-08 PROCEDURE — 1159F MED LIST DOCD IN RCRD: CPT | Mod: CPTII,S$GLB,, | Performed by: INTERNAL MEDICINE

## 2021-11-08 PROCEDURE — 3077F PR MOST RECENT SYSTOLIC BLOOD PRESSURE >= 140 MM HG: ICD-10-PCS | Mod: CPTII,S$GLB,, | Performed by: SURGERY

## 2021-11-08 PROCEDURE — 94010 BREATHING CAPACITY TEST: ICD-10-PCS | Mod: 26,,, | Performed by: INTERNAL MEDICINE

## 2021-11-08 PROCEDURE — 93990 DOPPLER FLOW TESTING: CPT | Mod: S$GLB,,, | Performed by: SURGERY

## 2021-11-08 PROCEDURE — 99214 OFFICE O/P EST MOD 30 MIN: CPT | Mod: 25,S$GLB,, | Performed by: INTERNAL MEDICINE

## 2021-11-08 PROCEDURE — 3080F DIAST BP >= 90 MM HG: CPT | Mod: CPTII,S$GLB,, | Performed by: INTERNAL MEDICINE

## 2021-11-08 PROCEDURE — 99999 PR PBB SHADOW E&M-EST. PATIENT-LVL III: CPT | Mod: PBBFAC,,, | Performed by: INTERNAL MEDICINE

## 2021-11-08 PROCEDURE — 3077F PR MOST RECENT SYSTOLIC BLOOD PRESSURE >= 140 MM HG: ICD-10-PCS | Mod: CPTII,S$GLB,, | Performed by: INTERNAL MEDICINE

## 2021-11-08 PROCEDURE — 71250 CT CHEST WITHOUT CONTRAST: ICD-10-PCS | Mod: 26,,, | Performed by: RADIOLOGY

## 2021-11-08 PROCEDURE — 3008F PR BODY MASS INDEX (BMI) DOCUMENTED: ICD-10-PCS | Mod: CPTII,S$GLB,, | Performed by: INTERNAL MEDICINE

## 2021-11-08 PROCEDURE — 94010 BREATHING CAPACITY TEST: CPT | Mod: 26,,, | Performed by: INTERNAL MEDICINE

## 2021-11-08 PROCEDURE — 1159F PR MEDICATION LIST DOCUMENTED IN MEDICAL RECORD: ICD-10-PCS | Mod: CPTII,S$GLB,, | Performed by: INTERNAL MEDICINE

## 2021-11-08 PROCEDURE — 3066F NEPHROPATHY DOC TX: CPT | Mod: CPTII,S$GLB,, | Performed by: INTERNAL MEDICINE

## 2021-11-08 PROCEDURE — 3046F HEMOGLOBIN A1C LEVEL >9.0%: CPT | Mod: CPTII,S$GLB,, | Performed by: INTERNAL MEDICINE

## 2021-11-08 PROCEDURE — 3066F PR DOCUMENTATION OF TREATMENT FOR NEPHROPATHY: ICD-10-PCS | Mod: CPTII,S$GLB,, | Performed by: INTERNAL MEDICINE

## 2021-11-08 PROCEDURE — 3046F PR MOST RECENT HEMOGLOBIN A1C LEVEL > 9.0%: ICD-10-PCS | Mod: CPTII,S$GLB,, | Performed by: INTERNAL MEDICINE

## 2021-11-08 PROCEDURE — 4010F ACE/ARB THERAPY RXD/TAKEN: CPT | Mod: CPTII,S$GLB,, | Performed by: SURGERY

## 2021-11-08 PROCEDURE — 99024 POSTOP FOLLOW-UP VISIT: CPT | Mod: S$GLB,,, | Performed by: SURGERY

## 2021-11-08 PROCEDURE — 3008F BODY MASS INDEX DOCD: CPT | Mod: CPTII,S$GLB,, | Performed by: SURGERY

## 2021-11-08 PROCEDURE — 3066F PR DOCUMENTATION OF TREATMENT FOR NEPHROPATHY: ICD-10-PCS | Mod: CPTII,S$GLB,, | Performed by: SURGERY

## 2021-11-08 PROCEDURE — 99999 PR PBB SHADOW E&M-EST. PATIENT-LVL IV: CPT | Mod: PBBFAC,,, | Performed by: SURGERY

## 2021-11-08 PROCEDURE — 99999 PR PBB SHADOW E&M-EST. PATIENT-LVL IV: ICD-10-PCS | Mod: PBBFAC,,, | Performed by: SURGERY

## 2021-11-08 PROCEDURE — 3046F PR MOST RECENT HEMOGLOBIN A1C LEVEL > 9.0%: ICD-10-PCS | Mod: CPTII,S$GLB,, | Performed by: SURGERY

## 2021-11-08 PROCEDURE — 99214 PR OFFICE/OUTPT VISIT, EST, LEVL IV, 30-39 MIN: ICD-10-PCS | Mod: 25,S$GLB,, | Performed by: INTERNAL MEDICINE

## 2021-11-08 PROCEDURE — 3077F SYST BP >= 140 MM HG: CPT | Mod: CPTII,S$GLB,, | Performed by: INTERNAL MEDICINE

## 2021-11-08 PROCEDURE — 3008F PR BODY MASS INDEX (BMI) DOCUMENTED: ICD-10-PCS | Mod: CPTII,S$GLB,, | Performed by: SURGERY

## 2021-11-08 PROCEDURE — 99999 PR PBB SHADOW E&M-EST. PATIENT-LVL III: ICD-10-PCS | Mod: PBBFAC,,, | Performed by: INTERNAL MEDICINE

## 2021-11-08 PROCEDURE — 71250 CT THORAX DX C-: CPT | Mod: 26,,, | Performed by: RADIOLOGY

## 2021-11-08 PROCEDURE — 3080F PR MOST RECENT DIASTOLIC BLOOD PRESSURE >= 90 MM HG: ICD-10-PCS | Mod: CPTII,S$GLB,, | Performed by: INTERNAL MEDICINE

## 2021-11-08 PROCEDURE — 3066F NEPHROPATHY DOC TX: CPT | Mod: CPTII,S$GLB,, | Performed by: SURGERY

## 2021-11-08 PROCEDURE — 3077F SYST BP >= 140 MM HG: CPT | Mod: CPTII,S$GLB,, | Performed by: SURGERY

## 2021-11-08 PROCEDURE — 3079F PR MOST RECENT DIASTOLIC BLOOD PRESSURE 80-89 MM HG: ICD-10-PCS | Mod: CPTII,S$GLB,, | Performed by: SURGERY

## 2021-11-08 PROCEDURE — 3008F BODY MASS INDEX DOCD: CPT | Mod: CPTII,S$GLB,, | Performed by: INTERNAL MEDICINE

## 2021-11-08 RX ORDER — PANTOPRAZOLE SODIUM 40 MG/1
40 TABLET, DELAYED RELEASE ORAL DAILY
Qty: 30 TABLET | Refills: 11 | Status: SHIPPED | OUTPATIENT
Start: 2021-11-08 | End: 2023-01-01 | Stop reason: SDUPTHER

## 2021-11-08 RX ORDER — AMOXICILLIN AND CLAVULANATE POTASSIUM 875; 125 MG/1; MG/1
1 TABLET, FILM COATED ORAL EVERY 12 HOURS
Qty: 14 TABLET | Refills: 0 | Status: CANCELLED | OUTPATIENT
Start: 2021-11-08 | End: 2021-11-15

## 2021-11-09 ENCOUNTER — PATIENT MESSAGE (OUTPATIENT)
Dept: TRANSPLANT | Facility: CLINIC | Age: 61
End: 2021-11-09
Payer: MEDICARE

## 2021-11-10 ENCOUNTER — HOSPITAL ENCOUNTER (OUTPATIENT)
Facility: HOSPITAL | Age: 61
Discharge: HOME | End: 2021-11-10
Attending: INTERNAL MEDICINE | Admitting: INTERNAL MEDICINE
Payer: MEDICARE

## 2021-11-10 VITALS
BODY MASS INDEX: 31.25 KG/M2 | RESPIRATION RATE: 20 BRPM | DIASTOLIC BLOOD PRESSURE: 80 MMHG | SYSTOLIC BLOOD PRESSURE: 137 MMHG | HEART RATE: 89 BPM | TEMPERATURE: 98 F | HEIGHT: 70 IN | WEIGHT: 218.25 LBS | OXYGEN SATURATION: 100 %

## 2021-11-10 DIAGNOSIS — T86.819 COMPLICATION OF LUNG TRANSPLANT: Primary | ICD-10-CM

## 2021-11-10 DIAGNOSIS — T86.818 OTHER COMPLICATION OF LUNG TRANSPLANT: ICD-10-CM

## 2021-11-10 DIAGNOSIS — J18.1 PULMONARY CONSOLIDATION DETERMINED BY EXAMINATION: ICD-10-CM

## 2021-11-10 LAB
APPEARANCE FLD: NORMAL
BODY FLD TYPE: NORMAL
COLOR FLD: NORMAL
EOSINOPHIL NFR FLD MANUAL: 3 %
LYMPHOCYTES NFR FLD MANUAL: 17 %
MONOS+MACROS NFR FLD MANUAL: 42 %
NEUTROPHILS NFR FLD MANUAL: 38 %
POCT GLUCOSE: 204 MG/DL (ref 70–110)
WBC # FLD: 111 /CU MM

## 2021-11-10 PROCEDURE — 87015 SPECIMEN INFECT AGNT CONCNTJ: CPT | Performed by: INTERNAL MEDICINE

## 2021-11-10 PROCEDURE — 87070 CULTURE OTHR SPECIMN AEROBIC: CPT | Performed by: INTERNAL MEDICINE

## 2021-11-10 PROCEDURE — C1726 CATH, BAL DIL, NON-VASCULAR: HCPCS | Performed by: INTERNAL MEDICINE

## 2021-11-10 PROCEDURE — 87116 MYCOBACTERIA CULTURE: CPT | Performed by: INTERNAL MEDICINE

## 2021-11-10 PROCEDURE — 87206 SMEAR FLUORESCENT/ACID STAI: CPT | Performed by: INTERNAL MEDICINE

## 2021-11-10 PROCEDURE — 99153 MOD SED SAME PHYS/QHP EA: CPT | Performed by: INTERNAL MEDICINE

## 2021-11-10 PROCEDURE — 63600175 PHARM REV CODE 636 W HCPCS: Performed by: INTERNAL MEDICINE

## 2021-11-10 PROCEDURE — 87205 SMEAR GRAM STAIN: CPT | Performed by: INTERNAL MEDICINE

## 2021-11-10 PROCEDURE — 87798 DETECT AGENT NOS DNA AMP: CPT | Performed by: INTERNAL MEDICINE

## 2021-11-10 PROCEDURE — 31624 DX BRONCHOSCOPE/LAVAGE: CPT | Performed by: INTERNAL MEDICINE

## 2021-11-10 PROCEDURE — 31624 DX BRONCHOSCOPE/LAVAGE: CPT | Mod: RT,,, | Performed by: INTERNAL MEDICINE

## 2021-11-10 PROCEDURE — 25000003 PHARM REV CODE 250: Performed by: INTERNAL MEDICINE

## 2021-11-10 PROCEDURE — 82962 GLUCOSE BLOOD TEST: CPT | Mod: 91 | Performed by: INTERNAL MEDICINE

## 2021-11-10 PROCEDURE — 87102 FUNGUS ISOLATION CULTURE: CPT | Performed by: INTERNAL MEDICINE

## 2021-11-10 PROCEDURE — 87305 ASPERGILLUS AG IA: CPT | Performed by: INTERNAL MEDICINE

## 2021-11-10 PROCEDURE — 87641 MR-STAPH DNA AMP PROBE: CPT | Performed by: INTERNAL MEDICINE

## 2021-11-10 PROCEDURE — 31624 PR BRONCHOSCOPY,DIAG2STIC W LAVAGE: ICD-10-PCS | Mod: RT,,, | Performed by: INTERNAL MEDICINE

## 2021-11-10 PROCEDURE — 89051 BODY FLUID CELL COUNT: CPT | Performed by: INTERNAL MEDICINE

## 2021-11-10 PROCEDURE — 99152 MOD SED SAME PHYS/QHP 5/>YRS: CPT | Performed by: INTERNAL MEDICINE

## 2021-11-10 PROCEDURE — 82962 GLUCOSE BLOOD TEST: CPT | Performed by: INTERNAL MEDICINE

## 2021-11-10 RX ORDER — FENTANYL CITRATE 50 UG/ML
INJECTION, SOLUTION INTRAMUSCULAR; INTRAVENOUS CODE/TRAUMA/SEDATION MEDICATION
Status: COMPLETED | OUTPATIENT
Start: 2021-11-10 | End: 2021-11-10

## 2021-11-10 RX ORDER — LIDOCAINE HYDROCHLORIDE 10 MG/ML
INJECTION INFILTRATION; PERINEURAL CODE/TRAUMA/SEDATION MEDICATION
Status: COMPLETED | OUTPATIENT
Start: 2021-11-10 | End: 2021-11-10

## 2021-11-10 RX ORDER — MIDAZOLAM HYDROCHLORIDE 5 MG/ML
INJECTION INTRAMUSCULAR; INTRAVENOUS CODE/TRAUMA/SEDATION MEDICATION
Status: COMPLETED | OUTPATIENT
Start: 2021-11-10 | End: 2021-11-10

## 2021-11-10 RX ORDER — LIDOCAINE HYDROCHLORIDE 20 MG/ML
INJECTION, SOLUTION INFILTRATION; PERINEURAL CODE/TRAUMA/SEDATION MEDICATION
Status: COMPLETED | OUTPATIENT
Start: 2021-11-10 | End: 2021-11-10

## 2021-11-10 RX ADMIN — FENTANYL CITRATE 75 MCG: 50 INJECTION, SOLUTION INTRAMUSCULAR; INTRAVENOUS at 08:11

## 2021-11-10 RX ADMIN — LIDOCAINE HYDROCHLORIDE 4 ML: 20 INJECTION, SOLUTION INFILTRATION; PERINEURAL at 08:11

## 2021-11-10 RX ADMIN — MIDAZOLAM 2 MG: 5 INJECTION INTRAMUSCULAR; INTRAVENOUS at 08:11

## 2021-11-10 RX ADMIN — TOPICAL ANESTHETIC 0.5 ML: 200 SPRAY DENTAL; PERIODONTAL at 08:11

## 2021-11-10 RX ADMIN — LIDOCAINE HYDROCHLORIDE 8 ML: 10 INJECTION, SOLUTION INFILTRATION; PERINEURAL at 08:11

## 2021-11-11 ENCOUNTER — PATIENT MESSAGE (OUTPATIENT)
Dept: TRANSPLANT | Facility: CLINIC | Age: 61
End: 2021-11-11
Payer: MEDICARE

## 2021-11-11 LAB — POCT GLUCOSE: 179 MG/DL (ref 70–110)

## 2021-11-12 LAB
BACTERIA SPEC AEROBE CULT: NO GROWTH
GRAM STN SPEC: NORMAL
GRAM STN SPEC: NORMAL

## 2021-11-14 LAB — GALACTOMANNAN AG SPEC-ACNC: <0.5 INDEX

## 2021-11-17 ENCOUNTER — TELEPHONE (OUTPATIENT)
Dept: VASCULAR SURGERY | Facility: CLINIC | Age: 61
End: 2021-11-17
Payer: MEDICARE

## 2021-11-17 ENCOUNTER — ANESTHESIA EVENT (OUTPATIENT)
Dept: SURGERY | Facility: HOSPITAL | Age: 61
End: 2021-11-17
Payer: MEDICARE

## 2021-11-18 ENCOUNTER — ANESTHESIA (OUTPATIENT)
Dept: SURGERY | Facility: HOSPITAL | Age: 61
End: 2021-11-18
Payer: MEDICARE

## 2021-11-18 ENCOUNTER — HOSPITAL ENCOUNTER (OUTPATIENT)
Facility: HOSPITAL | Age: 61
Discharge: HOME | End: 2021-11-18
Attending: SURGERY | Admitting: SURGERY
Payer: MEDICARE

## 2021-11-18 VITALS
OXYGEN SATURATION: 99 % | TEMPERATURE: 98 F | HEART RATE: 63 BPM | DIASTOLIC BLOOD PRESSURE: 80 MMHG | BODY MASS INDEX: 30.85 KG/M2 | RESPIRATION RATE: 16 BRPM | SYSTOLIC BLOOD PRESSURE: 125 MMHG | WEIGHT: 215 LBS

## 2021-11-18 DIAGNOSIS — N18.6 ESRD (END STAGE RENAL DISEASE): ICD-10-CM

## 2021-11-18 DIAGNOSIS — N18.6 END-STAGE RENAL DISEASE: Primary | Chronic | ICD-10-CM

## 2021-11-18 LAB
GLUCOSE SERPL-MCNC: 172 MG/DL (ref 70–110)
HCO3 UR-SCNC: 31.3 MMOL/L (ref 24–28)
HCT VFR BLD CALC: 34 %PCV (ref 36–54)
PCO2 BLDA: 55.4 MMHG (ref 35–45)
PH SMN: 7.36 [PH] (ref 7.35–7.45)
PO2 BLDA: 31 MMHG (ref 40–60)
POC BE: 6 MMOL/L
POC IONIZED CALCIUM: 1.08 MMOL/L (ref 1.06–1.42)
POC SATURATED O2: 56 % (ref 95–100)
POC TCO2: 33 MMOL/L (ref 24–29)
POCT GLUCOSE: 165 MG/DL (ref 70–110)
POCT GLUCOSE: 229 MG/DL (ref 70–110)
POTASSIUM BLD-SCNC: 4.6 MMOL/L (ref 3.5–5.1)
SAMPLE: ABNORMAL
SODIUM BLD-SCNC: 136 MMOL/L (ref 136–145)

## 2021-11-18 PROCEDURE — D9220A PRA ANESTHESIA: Mod: ,,, | Performed by: ANESTHESIOLOGY

## 2021-11-18 PROCEDURE — 82962 GLUCOSE BLOOD TEST: CPT | Performed by: SURGERY

## 2021-11-18 PROCEDURE — 37000009 HC ANESTHESIA EA ADD 15 MINS: Performed by: SURGERY

## 2021-11-18 PROCEDURE — 25000003 PHARM REV CODE 250: Performed by: STUDENT IN AN ORGANIZED HEALTH CARE EDUCATION/TRAINING PROGRAM

## 2021-11-18 PROCEDURE — 36832 PR AV FISTULA REVISION, OPEN, W/O THROMBECTOMY: ICD-10-PCS | Mod: 78,,, | Performed by: SURGERY

## 2021-11-18 PROCEDURE — 71000044 HC DOSC ROUTINE RECOVERY FIRST HOUR: Performed by: SURGERY

## 2021-11-18 PROCEDURE — 36000706: Performed by: SURGERY

## 2021-11-18 PROCEDURE — 63600175 PHARM REV CODE 636 W HCPCS: Performed by: STUDENT IN AN ORGANIZED HEALTH CARE EDUCATION/TRAINING PROGRAM

## 2021-11-18 PROCEDURE — 63600175 PHARM REV CODE 636 W HCPCS: Performed by: SURGERY

## 2021-11-18 PROCEDURE — D9220A PRA ANESTHESIA: ICD-10-PCS | Mod: ,,, | Performed by: ANESTHESIOLOGY

## 2021-11-18 PROCEDURE — 71000015 HC POSTOP RECOV 1ST HR: Performed by: SURGERY

## 2021-11-18 PROCEDURE — 37000008 HC ANESTHESIA 1ST 15 MINUTES: Performed by: SURGERY

## 2021-11-18 PROCEDURE — 36832 AV FISTULA REVISION OPEN: CPT | Mod: 78,,, | Performed by: SURGERY

## 2021-11-18 PROCEDURE — 76942 ECHO GUIDE FOR BIOPSY: CPT | Performed by: STUDENT IN AN ORGANIZED HEALTH CARE EDUCATION/TRAINING PROGRAM

## 2021-11-18 PROCEDURE — 36000707: Performed by: SURGERY

## 2021-11-18 RX ORDER — SODIUM CHLORIDE 0.9 % (FLUSH) 0.9 %
10 SYRINGE (ML) INJECTION
Status: DISCONTINUED | OUTPATIENT
Start: 2021-11-18 | End: 2021-11-18 | Stop reason: HOSPADM

## 2021-11-18 RX ORDER — PROPOFOL 10 MG/ML
VIAL (ML) INTRAVENOUS
Status: DISCONTINUED | OUTPATIENT
Start: 2021-11-18 | End: 2021-11-18

## 2021-11-18 RX ORDER — MUPIROCIN 20 MG/G
OINTMENT TOPICAL
Status: DISCONTINUED | OUTPATIENT
Start: 2021-11-18 | End: 2021-11-18 | Stop reason: HOSPADM

## 2021-11-18 RX ORDER — HEPARIN SODIUM 1000 [USP'U]/ML
INJECTION, SOLUTION INTRAVENOUS; SUBCUTANEOUS
Status: DISCONTINUED | OUTPATIENT
Start: 2021-11-18 | End: 2021-11-18 | Stop reason: HOSPADM

## 2021-11-18 RX ORDER — LIDOCAINE HYDROCHLORIDE 10 MG/ML
1 INJECTION, SOLUTION EPIDURAL; INFILTRATION; INTRACAUDAL; PERINEURAL ONCE
Status: DISCONTINUED | OUTPATIENT
Start: 2021-11-18 | End: 2021-11-18 | Stop reason: HOSPADM

## 2021-11-18 RX ORDER — FENTANYL CITRATE 50 UG/ML
INJECTION, SOLUTION INTRAMUSCULAR; INTRAVENOUS
Status: DISCONTINUED | OUTPATIENT
Start: 2021-11-18 | End: 2021-11-18

## 2021-11-18 RX ORDER — MIDAZOLAM HYDROCHLORIDE 1 MG/ML
INJECTION, SOLUTION INTRAMUSCULAR; INTRAVENOUS
Status: DISCONTINUED | OUTPATIENT
Start: 2021-11-18 | End: 2021-11-18

## 2021-11-18 RX ORDER — HYDROMORPHONE HYDROCHLORIDE 1 MG/ML
0.2 INJECTION, SOLUTION INTRAMUSCULAR; INTRAVENOUS; SUBCUTANEOUS EVERY 5 MIN PRN
Status: DISCONTINUED | OUTPATIENT
Start: 2021-11-18 | End: 2021-11-18 | Stop reason: HOSPADM

## 2021-11-18 RX ORDER — MIDAZOLAM HYDROCHLORIDE 1 MG/ML
.5-4 INJECTION INTRAMUSCULAR; INTRAVENOUS
Status: DISCONTINUED | OUTPATIENT
Start: 2021-11-18 | End: 2021-11-18 | Stop reason: HOSPADM

## 2021-11-18 RX ORDER — CEFAZOLIN SODIUM 1 G/3ML
2 INJECTION, POWDER, FOR SOLUTION INTRAMUSCULAR; INTRAVENOUS
Status: DISCONTINUED | OUTPATIENT
Start: 2021-11-18 | End: 2021-11-18 | Stop reason: HOSPADM

## 2021-11-18 RX ORDER — HYDROCODONE BITARTRATE AND ACETAMINOPHEN 5; 325 MG/1; MG/1
1 TABLET ORAL EVERY 4 HOURS PRN
Status: DISCONTINUED | OUTPATIENT
Start: 2021-11-18 | End: 2021-11-18 | Stop reason: HOSPADM

## 2021-11-18 RX ORDER — CEFAZOLIN SODIUM 1 G/3ML
INJECTION, POWDER, FOR SOLUTION INTRAMUSCULAR; INTRAVENOUS
Status: DISCONTINUED | OUTPATIENT
Start: 2021-11-18 | End: 2021-11-18

## 2021-11-18 RX ORDER — HALOPERIDOL 5 MG/ML
0.5 INJECTION INTRAMUSCULAR EVERY 10 MIN PRN
Status: DISCONTINUED | OUTPATIENT
Start: 2021-11-18 | End: 2021-11-18 | Stop reason: HOSPADM

## 2021-11-18 RX ORDER — LIDOCAINE HYDROCHLORIDE 20 MG/ML
INJECTION, SOLUTION EPIDURAL; INFILTRATION; INTRACAUDAL; PERINEURAL
Status: DISCONTINUED | OUTPATIENT
Start: 2021-11-18 | End: 2021-11-18

## 2021-11-18 RX ORDER — DEXMEDETOMIDINE HYDROCHLORIDE 100 UG/ML
INJECTION, SOLUTION INTRAVENOUS
Status: DISCONTINUED | OUTPATIENT
Start: 2021-11-18 | End: 2021-11-18

## 2021-11-18 RX ORDER — FENTANYL CITRATE 50 UG/ML
25-200 INJECTION, SOLUTION INTRAMUSCULAR; INTRAVENOUS
Status: DISCONTINUED | OUTPATIENT
Start: 2021-11-18 | End: 2021-11-18 | Stop reason: HOSPADM

## 2021-11-18 RX ADMIN — DEXMEDETOMIDINE HYDROCHLORIDE 8 MCG: 100 INJECTION, SOLUTION INTRAVENOUS at 08:11

## 2021-11-18 RX ADMIN — FENTANYL CITRATE 50 MCG: 50 INJECTION INTRAMUSCULAR; INTRAVENOUS at 07:11

## 2021-11-18 RX ADMIN — PROPOFOL 20 MCG/KG/MIN: 10 INJECTION, EMULSION INTRAVENOUS at 07:11

## 2021-11-18 RX ADMIN — PROPOFOL 20 MG: 10 INJECTION, EMULSION INTRAVENOUS at 07:11

## 2021-11-18 RX ADMIN — MEPIVACAINE HYDROCHLORIDE 40 ML: 15 INJECTION, SOLUTION EPIDURAL; INFILTRATION at 07:11

## 2021-11-18 RX ADMIN — DEXMEDETOMIDINE HYDROCHLORIDE 8 MCG: 100 INJECTION, SOLUTION INTRAVENOUS at 07:11

## 2021-11-18 RX ADMIN — MIDAZOLAM 2 MG: 1 INJECTION INTRAMUSCULAR; INTRAVENOUS at 07:11

## 2021-11-18 RX ADMIN — PROPOFOL 10 MG: 10 INJECTION, EMULSION INTRAVENOUS at 07:11

## 2021-11-18 RX ADMIN — CEFAZOLIN 2 G: 330 INJECTION, POWDER, FOR SOLUTION INTRAMUSCULAR; INTRAVENOUS at 07:11

## 2021-11-18 RX ADMIN — SODIUM CHLORIDE: 0.9 INJECTION, SOLUTION INTRAVENOUS at 07:11

## 2021-11-18 RX ADMIN — MUPIROCIN: 20 OINTMENT TOPICAL at 06:11

## 2021-11-18 RX ADMIN — LIDOCAINE HYDROCHLORIDE 40 MG: 20 INJECTION, SOLUTION EPIDURAL; INFILTRATION; INTRACAUDAL at 07:11

## 2021-11-19 DIAGNOSIS — N18.6 ESRD (END STAGE RENAL DISEASE) ON DIALYSIS: Primary | ICD-10-CM

## 2021-11-19 DIAGNOSIS — Z99.2 ESRD (END STAGE RENAL DISEASE) ON DIALYSIS: Primary | ICD-10-CM

## 2021-11-22 ENCOUNTER — TELEPHONE (OUTPATIENT)
Dept: TRANSPLANT | Facility: CLINIC | Age: 61
End: 2021-11-22
Payer: MEDICARE

## 2021-11-22 DIAGNOSIS — D72.10 EOSINOPHILIA, UNSPECIFIED TYPE: Primary | ICD-10-CM

## 2021-11-23 ENCOUNTER — PATIENT MESSAGE (OUTPATIENT)
Dept: TRANSPLANT | Facility: CLINIC | Age: 61
End: 2021-11-23
Payer: MEDICARE

## 2021-11-24 ENCOUNTER — TELEPHONE (OUTPATIENT)
Dept: TRANSPLANT | Facility: CLINIC | Age: 61
End: 2021-11-24
Payer: MEDICARE

## 2021-11-30 ENCOUNTER — TELEPHONE (OUTPATIENT)
Dept: TRANSPLANT | Facility: CLINIC | Age: 61
End: 2021-11-30
Payer: MEDICARE

## 2021-12-07 ENCOUNTER — PATIENT OUTREACH (OUTPATIENT)
Dept: ADMINISTRATIVE | Facility: OTHER | Age: 61
End: 2021-12-07
Payer: MEDICARE

## 2021-12-07 ENCOUNTER — DOCUMENTATION ONLY (OUTPATIENT)
Dept: TRANSPLANT | Facility: CLINIC | Age: 61
End: 2021-12-07
Payer: MEDICARE

## 2021-12-08 ENCOUNTER — TELEPHONE (OUTPATIENT)
Dept: TRANSPLANT | Facility: CLINIC | Age: 61
End: 2021-12-08

## 2021-12-08 ENCOUNTER — HOSPITAL ENCOUNTER (OUTPATIENT)
Dept: PULMONOLOGY | Facility: HOSPITAL | Age: 61
Discharge: HOME | End: 2021-12-08
Attending: INTERNAL MEDICINE
Payer: MEDICARE

## 2021-12-08 ENCOUNTER — OFFICE VISIT (OUTPATIENT)
Dept: TRANSPLANT | Facility: CLINIC | Age: 61
End: 2021-12-08
Payer: MEDICARE

## 2021-12-08 ENCOUNTER — HOSPITAL ENCOUNTER (OUTPATIENT)
Dept: RADIOLOGY | Facility: HOSPITAL | Age: 61
Discharge: HOME | End: 2021-12-08
Attending: INTERNAL MEDICINE
Payer: MEDICARE

## 2021-12-08 VITALS
HEIGHT: 70 IN | SYSTOLIC BLOOD PRESSURE: 172 MMHG | TEMPERATURE: 98 F | DIASTOLIC BLOOD PRESSURE: 89 MMHG | RESPIRATION RATE: 20 BRPM | OXYGEN SATURATION: 96 % | HEART RATE: 92 BPM | WEIGHT: 222.69 LBS | BODY MASS INDEX: 31.88 KG/M2

## 2021-12-08 DIAGNOSIS — J96.11 CHRONIC RESPIRATORY FAILURE WITH HYPOXIA AND HYPERCAPNIA: ICD-10-CM

## 2021-12-08 DIAGNOSIS — Z94.2 LUNG REPLACED BY TRANSPLANT: ICD-10-CM

## 2021-12-08 DIAGNOSIS — Z48.24 ENCOUNTER FOR AFTERCARE FOLLOWING LUNG TRANSPLANT: Primary | ICD-10-CM

## 2021-12-08 DIAGNOSIS — Z79.2 PROPHYLACTIC ANTIBIOTIC: ICD-10-CM

## 2021-12-08 DIAGNOSIS — K21.9 GASTROESOPHAGEAL REFLUX DISEASE, UNSPECIFIED WHETHER ESOPHAGITIS PRESENT: ICD-10-CM

## 2021-12-08 DIAGNOSIS — E11.65 TYPE 2 DIABETES MELLITUS WITH HYPERGLYCEMIA, WITH LONG-TERM CURRENT USE OF INSULIN: ICD-10-CM

## 2021-12-08 DIAGNOSIS — J96.12 CHRONIC RESPIRATORY FAILURE WITH HYPOXIA AND HYPERCAPNIA: ICD-10-CM

## 2021-12-08 DIAGNOSIS — Z79.4 TYPE 2 DIABETES MELLITUS WITH HYPERGLYCEMIA, WITH LONG-TERM CURRENT USE OF INSULIN: ICD-10-CM

## 2021-12-08 DIAGNOSIS — D84.9 IMMUNOSUPPRESSION: ICD-10-CM

## 2021-12-08 DIAGNOSIS — N18.6 ESRD (END STAGE RENAL DISEASE): ICD-10-CM

## 2021-12-08 LAB
FEF 25 75 LLN: 1.52
FEF 25 75 PRE REF: 19 %
FEF 25 75 REF: 3.05
FEV05 LLN: 1.69
FEV05 REF: 2.83
FEV1 FVC LLN: 66
FEV1 FVC PRE REF: 85.1 %
FEV1 FVC REF: 78
FEV1 LLN: 2.74
FEV1 PRE REF: 30.2 %
FEV1 REF: 3.62
FVC LLN: 3.58
FVC PRE REF: 35.4 %
FVC REF: 4.68
PEF LLN: 6.98
PEF PRE REF: 44.1 %
PEF REF: 9.3
PRE FEF 25 75: 0.58 L/S (ref 1.52–5.12)
PRE FET 100: 5.87 SEC
PRE FEV05 REF: 30.6 %
PRE FEV1 FVC: 66.11 % (ref 65.77–87.96)
PRE FEV1: 1.09 L (ref 2.74–4.46)
PRE FEV5: 0.87 L (ref 1.69–3.96)
PRE FVC: 1.66 L (ref 3.58–5.8)
PRE PEF: 4.1 L/S (ref 6.98–11.61)

## 2021-12-08 PROCEDURE — 94010 BREATHING CAPACITY TEST: ICD-10-PCS | Mod: 26,,, | Performed by: INTERNAL MEDICINE

## 2021-12-08 PROCEDURE — 4010F ACE/ARB THERAPY RXD/TAKEN: CPT | Mod: CPTII,S$GLB,, | Performed by: PHYSICIAN ASSISTANT

## 2021-12-08 PROCEDURE — 71046 XR CHEST PA AND LATERAL: ICD-10-PCS | Mod: 26,,, | Performed by: RADIOLOGY

## 2021-12-08 PROCEDURE — 3066F NEPHROPATHY DOC TX: CPT | Mod: CPTII,S$GLB,, | Performed by: PHYSICIAN ASSISTANT

## 2021-12-08 PROCEDURE — 99999 PR PBB SHADOW E&M-EST. PATIENT-LVL V: CPT | Mod: PBBFAC,,, | Performed by: PHYSICIAN ASSISTANT

## 2021-12-08 PROCEDURE — 3066F PR DOCUMENTATION OF TREATMENT FOR NEPHROPATHY: ICD-10-PCS | Mod: CPTII,S$GLB,, | Performed by: PHYSICIAN ASSISTANT

## 2021-12-08 PROCEDURE — 99999 PR PBB SHADOW E&M-EST. PATIENT-LVL V: ICD-10-PCS | Mod: PBBFAC,,, | Performed by: PHYSICIAN ASSISTANT

## 2021-12-08 PROCEDURE — 94010 BREATHING CAPACITY TEST: CPT | Mod: 26,,, | Performed by: INTERNAL MEDICINE

## 2021-12-08 PROCEDURE — 4010F PR ACE/ARB THEARPY RXD/TAKEN: ICD-10-PCS | Mod: CPTII,S$GLB,, | Performed by: PHYSICIAN ASSISTANT

## 2021-12-08 PROCEDURE — 99215 PR OFFICE/OUTPT VISIT, EST, LEVL V, 40-54 MIN: ICD-10-PCS | Mod: 25,S$GLB,, | Performed by: PHYSICIAN ASSISTANT

## 2021-12-08 PROCEDURE — 71046 X-RAY EXAM CHEST 2 VIEWS: CPT | Mod: TC

## 2021-12-08 PROCEDURE — 99215 OFFICE O/P EST HI 40 MIN: CPT | Mod: 25,S$GLB,, | Performed by: PHYSICIAN ASSISTANT

## 2021-12-08 PROCEDURE — 71046 X-RAY EXAM CHEST 2 VIEWS: CPT | Mod: 26,,, | Performed by: RADIOLOGY

## 2021-12-09 ENCOUNTER — PATIENT MESSAGE (OUTPATIENT)
Dept: TRANSPLANT | Facility: CLINIC | Age: 61
End: 2021-12-09
Payer: MEDICARE

## 2021-12-09 DIAGNOSIS — Z94.2 LUNG REPLACED BY TRANSPLANT: ICD-10-CM

## 2021-12-09 RX ORDER — TACROLIMUS 0.5 MG/1
CAPSULE ORAL
Qty: 335 CAPSULE | Refills: 11 | Status: SHIPPED | OUTPATIENT
Start: 2021-12-09 | End: 2022-01-12 | Stop reason: SDUPTHER

## 2021-12-13 ENCOUNTER — OFFICE VISIT (OUTPATIENT)
Dept: VASCULAR SURGERY | Facility: CLINIC | Age: 61
End: 2021-12-13
Attending: SURGERY
Payer: MEDICARE

## 2021-12-13 ENCOUNTER — HOSPITAL ENCOUNTER (OUTPATIENT)
Dept: VASCULAR SURGERY | Facility: CLINIC | Age: 61
Discharge: HOME | End: 2021-12-13
Attending: SURGERY
Payer: MEDICARE

## 2021-12-13 VITALS
HEIGHT: 71 IN | TEMPERATURE: 99 F | BODY MASS INDEX: 31.48 KG/M2 | WEIGHT: 224.88 LBS | SYSTOLIC BLOOD PRESSURE: 173 MMHG | HEART RATE: 93 BPM | DIASTOLIC BLOOD PRESSURE: 83 MMHG

## 2021-12-13 DIAGNOSIS — Z99.2 ESRD (END STAGE RENAL DISEASE) ON DIALYSIS: ICD-10-CM

## 2021-12-13 DIAGNOSIS — N18.6 ESRD (END STAGE RENAL DISEASE) ON DIALYSIS: ICD-10-CM

## 2021-12-13 DIAGNOSIS — Z99.2 ARTERIOVENOUS FISTULA FOR HEMODIALYSIS IN PLACE, PRIMARY: Primary | ICD-10-CM

## 2021-12-13 LAB — FUNGUS SPEC CULT: NORMAL

## 2021-12-13 PROCEDURE — 93990 DOPPLER FLOW TESTING: CPT | Mod: S$GLB,,, | Performed by: SURGERY

## 2021-12-13 PROCEDURE — 99024 POSTOP FOLLOW-UP VISIT: CPT | Mod: S$GLB,,, | Performed by: SURGERY

## 2021-12-13 PROCEDURE — 99999 PR PBB SHADOW E&M-EST. PATIENT-LVL IV: CPT | Mod: PBBFAC,,, | Performed by: SURGERY

## 2021-12-13 PROCEDURE — 99024 PR POST-OP FOLLOW-UP VISIT: ICD-10-PCS | Mod: S$GLB,,, | Performed by: SURGERY

## 2021-12-13 PROCEDURE — 93990 PR DUPLEX HEMODIALYSIS ACCESS: ICD-10-PCS | Mod: S$GLB,,, | Performed by: SURGERY

## 2021-12-13 PROCEDURE — 4010F ACE/ARB THERAPY RXD/TAKEN: CPT | Mod: CPTII,S$GLB,, | Performed by: SURGERY

## 2021-12-13 PROCEDURE — 4010F PR ACE/ARB THEARPY RXD/TAKEN: ICD-10-PCS | Mod: CPTII,S$GLB,, | Performed by: SURGERY

## 2021-12-13 PROCEDURE — 99999 PR PBB SHADOW E&M-EST. PATIENT-LVL IV: ICD-10-PCS | Mod: PBBFAC,,, | Performed by: SURGERY

## 2021-12-13 PROCEDURE — 3066F PR DOCUMENTATION OF TREATMENT FOR NEPHROPATHY: ICD-10-PCS | Mod: CPTII,S$GLB,, | Performed by: SURGERY

## 2021-12-13 PROCEDURE — 3066F NEPHROPATHY DOC TX: CPT | Mod: CPTII,S$GLB,, | Performed by: SURGERY

## 2021-12-15 DIAGNOSIS — N18.30 CHRONIC KIDNEY DISEASE (CKD), STAGE III (MODERATE): ICD-10-CM

## 2021-12-15 RX ORDER — FUROSEMIDE 40 MG/1
TABLET ORAL
Qty: 30 TABLET | Refills: 7 | Status: SHIPPED | OUTPATIENT
Start: 2021-12-15 | End: 2022-06-09

## 2021-12-20 ENCOUNTER — LAB VISIT (OUTPATIENT)
Dept: LAB | Facility: HOSPITAL | Age: 61
End: 2021-12-20
Attending: FAMILY MEDICINE
Payer: MEDICARE

## 2021-12-20 DIAGNOSIS — Z94.2 LUNG REPLACED BY TRANSPLANT: ICD-10-CM

## 2021-12-20 PROCEDURE — 80169 DRUG ASSAY EVEROLIMUS: CPT | Performed by: INTERNAL MEDICINE

## 2021-12-20 PROCEDURE — 36415 COLL VENOUS BLD VENIPUNCTURE: CPT | Mod: PO | Performed by: INTERNAL MEDICINE

## 2021-12-21 DIAGNOSIS — Z79.899 MEDICATION MANAGEMENT: Primary | ICD-10-CM

## 2021-12-22 LAB — EVEROLIMUS BLD-MCNC: 2.9 NG/ML

## 2021-12-23 ENCOUNTER — PATIENT MESSAGE (OUTPATIENT)
Dept: TRANSPLANT | Facility: CLINIC | Age: 61
End: 2021-12-23
Payer: MEDICARE

## 2021-12-29 LAB
ACID FAST MOD KINY STN SPEC: NORMAL
MYCOBACTERIUM SPEC QL CULT: NORMAL

## 2022-01-01 ENCOUNTER — ANESTHESIA (OUTPATIENT)
Dept: ENDOSCOPY | Facility: HOSPITAL | Age: 62
End: 2022-01-01
Payer: MEDICARE

## 2022-01-01 ENCOUNTER — OUTPATIENT CASE MANAGEMENT (OUTPATIENT)
Dept: ADMINISTRATIVE | Facility: OTHER | Age: 62
End: 2022-01-01
Payer: MEDICARE

## 2022-01-01 ENCOUNTER — ANESTHESIA EVENT (OUTPATIENT)
Dept: ENDOSCOPY | Facility: HOSPITAL | Age: 62
End: 2022-01-01
Payer: MEDICARE

## 2022-01-01 ENCOUNTER — HOSPITAL ENCOUNTER (OUTPATIENT)
Facility: HOSPITAL | Age: 62
Discharge: HOME | End: 2022-11-02
Attending: SURGERY | Admitting: SURGERY
Payer: MEDICARE

## 2022-01-01 VITALS
WEIGHT: 200 LBS | HEART RATE: 85 BPM | TEMPERATURE: 99 F | HEIGHT: 70 IN | BODY MASS INDEX: 28.63 KG/M2 | RESPIRATION RATE: 19 BRPM | DIASTOLIC BLOOD PRESSURE: 91 MMHG | OXYGEN SATURATION: 100 % | SYSTOLIC BLOOD PRESSURE: 141 MMHG

## 2022-01-01 DIAGNOSIS — Z79.52 LONG TERM SYSTEMIC STEROID USER: Primary | ICD-10-CM

## 2022-01-01 DIAGNOSIS — Z12.11 COLON CANCER SCREENING: Primary | ICD-10-CM

## 2022-01-01 DIAGNOSIS — E83.42 HYPOMAGNESEMIA: ICD-10-CM

## 2022-01-01 LAB — POCT GLUCOSE: 172 MG/DL (ref 70–110)

## 2022-01-01 PROCEDURE — 82962 GLUCOSE BLOOD TEST: CPT | Performed by: SURGERY

## 2022-01-01 PROCEDURE — G0121 COLON CA SCRN NOT HI RSK IND: HCPCS | Mod: ,,, | Performed by: SURGERY

## 2022-01-01 PROCEDURE — 37000009 HC ANESTHESIA EA ADD 15 MINS: Performed by: SURGERY

## 2022-01-01 PROCEDURE — 63600175 PHARM REV CODE 636 W HCPCS: Performed by: NURSE ANESTHETIST, CERTIFIED REGISTERED

## 2022-01-01 PROCEDURE — 25000003 PHARM REV CODE 250: Performed by: NURSE ANESTHETIST, CERTIFIED REGISTERED

## 2022-01-01 PROCEDURE — G0121 COLON CA SCRN NOT HI RSK IND: ICD-10-PCS | Mod: ,,, | Performed by: SURGERY

## 2022-01-01 PROCEDURE — 37000008 HC ANESTHESIA 1ST 15 MINUTES: Performed by: SURGERY

## 2022-01-01 PROCEDURE — G0121 COLON CA SCRN NOT HI RSK IND: HCPCS | Performed by: SURGERY

## 2022-01-01 RX ORDER — CARVEDILOL 3.12 MG/1
3.12 TABLET ORAL 2 TIMES DAILY WITH MEALS
COMMUNITY
End: 2023-01-01 | Stop reason: ALTCHOICE

## 2022-01-01 RX ORDER — LIDOCAINE HYDROCHLORIDE 20 MG/ML
INJECTION INTRAVENOUS
Status: DISCONTINUED | OUTPATIENT
Start: 2022-01-01 | End: 2022-01-01

## 2022-01-01 RX ORDER — METOPROLOL TARTRATE 25 MG/1
25 TABLET, FILM COATED ORAL 2 TIMES DAILY
Qty: 180 TABLET | Refills: 3 | Status: SHIPPED | OUTPATIENT
Start: 2022-01-01 | End: 2023-01-01

## 2022-01-01 RX ORDER — VALSARTAN 160 MG/1
160 TABLET ORAL DAILY
Qty: 90 TABLET | Refills: 3 | Status: SHIPPED | OUTPATIENT
Start: 2022-01-01 | End: 2023-01-01 | Stop reason: ALTCHOICE

## 2022-01-01 RX ORDER — PROPOFOL 10 MG/ML
VIAL (ML) INTRAVENOUS
Status: DISCONTINUED | OUTPATIENT
Start: 2022-01-01 | End: 2022-01-01

## 2022-01-01 RX ORDER — FERRIC CITRATE 210 MG/1
TABLET, COATED ORAL
Qty: 810 TABLET | Refills: 3 | Status: SHIPPED | OUTPATIENT
Start: 2022-01-01

## 2022-01-01 RX ORDER — ROSUVASTATIN CALCIUM 10 MG/1
10 TABLET, COATED ORAL DAILY
Qty: 30 TABLET | Refills: 3 | Status: SHIPPED | OUTPATIENT
Start: 2022-01-01 | End: 2023-01-01 | Stop reason: SDUPTHER

## 2022-01-01 RX ADMIN — PROPOFOL 30 MG: 10 INJECTION, EMULSION INTRAVENOUS at 11:11

## 2022-01-01 RX ADMIN — PROPOFOL 60 MG: 10 INJECTION, EMULSION INTRAVENOUS at 11:11

## 2022-01-01 RX ADMIN — SODIUM CHLORIDE: 9 INJECTION, SOLUTION INTRAVENOUS at 11:11

## 2022-01-01 RX ADMIN — Medication 40 MG: at 11:11

## 2022-01-03 ENCOUNTER — HOSPITAL ENCOUNTER (OUTPATIENT)
Dept: VASCULAR SURGERY | Facility: CLINIC | Age: 62
Discharge: HOME | End: 2022-01-03
Attending: SURGERY
Payer: MEDICARE

## 2022-01-03 ENCOUNTER — OFFICE VISIT (OUTPATIENT)
Dept: VASCULAR SURGERY | Facility: CLINIC | Age: 62
End: 2022-01-03
Attending: SURGERY
Payer: MEDICARE

## 2022-01-03 VITALS
DIASTOLIC BLOOD PRESSURE: 81 MMHG | WEIGHT: 227.06 LBS | HEART RATE: 93 BPM | SYSTOLIC BLOOD PRESSURE: 152 MMHG | TEMPERATURE: 98 F | BODY MASS INDEX: 31.79 KG/M2 | HEIGHT: 71 IN

## 2022-01-03 DIAGNOSIS — N18.6 ESRD (END STAGE RENAL DISEASE) ON DIALYSIS: ICD-10-CM

## 2022-01-03 DIAGNOSIS — T82.858A STENOSIS OF ARTERIOVENOUS DIALYSIS FISTULA, INITIAL ENCOUNTER: Primary | ICD-10-CM

## 2022-01-03 DIAGNOSIS — Z99.2 ESRD (END STAGE RENAL DISEASE) ON DIALYSIS: ICD-10-CM

## 2022-01-03 DIAGNOSIS — Z01.818 PRE-OP EVALUATION: Primary | ICD-10-CM

## 2022-01-03 DIAGNOSIS — Z99.2 ESRD (END STAGE RENAL DISEASE) ON DIALYSIS: Primary | ICD-10-CM

## 2022-01-03 DIAGNOSIS — N18.6 ESRD (END STAGE RENAL DISEASE) ON DIALYSIS: Primary | ICD-10-CM

## 2022-01-03 PROCEDURE — 3077F PR MOST RECENT SYSTOLIC BLOOD PRESSURE >= 140 MM HG: ICD-10-PCS | Mod: CPTII,S$GLB,, | Performed by: SURGERY

## 2022-01-03 PROCEDURE — 93990 DOPPLER FLOW TESTING: CPT | Mod: S$GLB,,, | Performed by: SURGERY

## 2022-01-03 PROCEDURE — 3008F BODY MASS INDEX DOCD: CPT | Mod: CPTII,S$GLB,, | Performed by: SURGERY

## 2022-01-03 PROCEDURE — 99499 UNLISTED E&M SERVICE: CPT | Mod: S$GLB,,, | Performed by: SURGERY

## 2022-01-03 PROCEDURE — 99999 PR PBB SHADOW E&M-EST. PATIENT-LVL III: ICD-10-PCS | Mod: PBBFAC,,, | Performed by: SURGERY

## 2022-01-03 PROCEDURE — 3008F PR BODY MASS INDEX (BMI) DOCUMENTED: ICD-10-PCS | Mod: CPTII,S$GLB,, | Performed by: SURGERY

## 2022-01-03 PROCEDURE — 1159F MED LIST DOCD IN RCRD: CPT | Mod: CPTII,S$GLB,, | Performed by: SURGERY

## 2022-01-03 PROCEDURE — 3079F DIAST BP 80-89 MM HG: CPT | Mod: CPTII,S$GLB,, | Performed by: SURGERY

## 2022-01-03 PROCEDURE — 99024 PR POST-OP FOLLOW-UP VISIT: ICD-10-PCS | Mod: S$GLB,,, | Performed by: SURGERY

## 2022-01-03 PROCEDURE — 99024 POSTOP FOLLOW-UP VISIT: CPT | Mod: S$GLB,,, | Performed by: SURGERY

## 2022-01-03 PROCEDURE — 1159F PR MEDICATION LIST DOCUMENTED IN MEDICAL RECORD: ICD-10-PCS | Mod: CPTII,S$GLB,, | Performed by: SURGERY

## 2022-01-03 PROCEDURE — 3079F PR MOST RECENT DIASTOLIC BLOOD PRESSURE 80-89 MM HG: ICD-10-PCS | Mod: CPTII,S$GLB,, | Performed by: SURGERY

## 2022-01-03 PROCEDURE — 99999 PR PBB SHADOW E&M-EST. PATIENT-LVL III: CPT | Mod: PBBFAC,,, | Performed by: SURGERY

## 2022-01-03 PROCEDURE — 99499 RISK ADDL DX/OHS AUDIT: ICD-10-PCS | Mod: S$GLB,,, | Performed by: SURGERY

## 2022-01-03 PROCEDURE — 3072F PR LOW RISK FOR RETINOPATHY: ICD-10-PCS | Mod: CPTII,S$GLB,, | Performed by: SURGERY

## 2022-01-03 PROCEDURE — 3072F LOW RISK FOR RETINOPATHY: CPT | Mod: CPTII,S$GLB,, | Performed by: SURGERY

## 2022-01-03 PROCEDURE — 93990 PR DUPLEX HEMODIALYSIS ACCESS: ICD-10-PCS | Mod: S$GLB,,, | Performed by: SURGERY

## 2022-01-03 PROCEDURE — 3077F SYST BP >= 140 MM HG: CPT | Mod: CPTII,S$GLB,, | Performed by: SURGERY

## 2022-01-03 NOTE — H&P (VIEW-ONLY)
VASCULAR SURGERY NOTE    Patient ID: Martin Hendrix Jr. is a 61 y.o. male.    I. HISTORY     Chief Complaint: post-op    HPI: Martin Hendrix Jr. is a 61 y.o. male who is here today for post-op appointment.  The patient has a left radiocephalic AV fistula in the distal forearm which was created by me on 10/13/2021.  He returned to the OR 11/18/21 for ligation of side branches because the fistula had not had adequate maturation at his follow up ultrasound. His has been cannulating with 1 needle for the past 10 days successfully. He does note some bruising at one of the cannulation sites.      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 Peacock    DM (diabetes mellitus) 11/2017     02/22/2021 Insulin x 2017    Hypertension     On home oxygen therapy     KRISTYN on CPAP     Osteopenia     Pancreatitis 2009    hospitalized    Pulmonary hypertension     Pure hypercholesterolemia 11/15/2017    Sarcoidosis     Shortness of breath     Type 2 diabetes mellitus 11/5/2017        Past Surgical History:   Procedure Laterality Date    ABDOMINAL SURGERY      6 weeks old    AV FISTULA PLACEMENT Left 10/13/2021    Procedure: CREATION, AV FISTULA;  Surgeon: CARLI Thomason II, MD;  Location: [facility] OR [address] [place] [address];  Service: Vascular;  Laterality: Left;    BRONCHOSCOPY      BRONCHOSCOPY N/A 8/22/2018    Procedure: flexible bronchoscopy with tissue biopsy CPT 69449;  Surgeon: [facility] Diagnostic Provider;  Location: [facility] OR [address] [place] [address];  Service: Endoscopy;  Laterality: N/A;    BRONCHOSCOPY N/A 11/20/2018    Procedure: flexible bronchoscopy with tissue biopy CPT 96611;  Surgeon: [facility] Diagnostic Provider;  [facility];  Service: Anesthesiology;  Laterality: N/A;    BRONCHOSCOPY N/A 11/10/2021    Procedure: Flexible bronchoscopy;   Surgeon: Samantha Bill DO;  Location: [facility] OR 2ND FLR;  Service: Endoscopy;  Laterality: N/A;    CHEST TUBE INSERTION      CHOLECYSTECTOMY      COLONOSCOPY      ESOPHAGOGASTRODUODENOSCOPY N/A 8/6/2018    Procedure: EGD (ESOPHAGOGASTRODUODENOSCOPY);  Surgeon: Ramu Eisenberg MD;  Location: [facility] ENDO (2ND FLR);  Service: Endoscopy;  Laterality: N/A;  off pepcid and all acid reducers for 2 weeks; PA > 50    INSERTION OF TUNNELED CENTRAL VENOUS HEMODIALYSIS CATHETER N/A 3/13/2019    Procedure: INSERTION, CATHETER, CENTRAL VENOUS, HEMODIALYSIS, TUNNELED;  Surgeon: Edy Gonzalez MD;  Location: [facility] CATH LAB;  Service: Nephrology;  Laterality: N/A;    INSERTION OF TUNNELED CENTRAL VENOUS HEMODIALYSIS CATHETER Right 5/7/2021    Procedure: Insertion, Catheter, Central Venous, Hemodialysis;  Surgeon: Mary Joshi MD;  Location: [facility] CATH LAB;  Service: Interventional Nephrology;  Laterality: Right;    LUNG BIOPSY      LUNG TRANSPLANT  08/2017    REMOVAL OF TUNNELED CENTRAL VENOUS CATHETER (CVC) N/A 5/16/2019    Procedure: REMOVAL, CATHETER, CENTRAL VENOUS, TUNNELED;  Surgeon: Edy Gonzalez MD;  Location: [facility] CATH LAB;  Service: Nephrology;  Laterality: N/A;    REVISION OF ARTERIOVENOUS FISTULA Left 11/18/2021    Procedure: REVISION, AV FISTULA;  Surgeon: CARLI Thomason II, MD;  [facility];  Service: Vascular;  Laterality: Left;  Ligation of side branches    TONSILLECTOMY       Social History     Occupational History    Not on file   Tobacco Use    Smoking status: Never Smoker    Smokeless tobacco: Never Used   Substance and Sexual Activity    Alcohol use: No    Drug use: No    Sexual activity: Not on file         ROS      II. PHYSICAL EXAM     Physical Exam  Transverse incisions on left forearm CDI well healed, areas of bruising and small hematoma in distal forearm  Fistula is pulsatile in the distal forearm and there is a continuous thrill in the upper forearm  suggestive of focal stenosis in the upper forearm    III. ASSESSMENT & PLAN (MEDICAL DECISION MAKING)     No diagnosis found.    Imaging Results:  (I have personally reviewed the imaging and provided my interpretation below)  LUE AVF U/S 11/8/21: Rediocephalic AVF is patient without evidence of stenosis. Fistula depth is <6mm but diameter is <5mm throughout and volume flow is 433ml/min. Radial artery waveform proximal to anastomosis with brisk upstroke without evidence of stenosis.      LUE AVF U/S 12/13/21:  Volume flow increased to 568ml/min. Diameter increased to 6-7mm throughout. Depth less than 6mm.     LUE AVF U/S 1/3/22: Volume flow 600ml/min. Focal velocity elevation in upper forearm PSV >800cm/s suggestive of hemodynamically significant stenosis.    Assessment/Diagnosis and Plan:  61 y.o. male with left radiocephalic AVF now with significantly improved maturation after ligation of side branches on 11/18/21. He has cannulated succesfully with 1 needle but has not yet cannulated with 2 needles.Volume flow is 600ml/min and diameter/depth are adequate for HD. His fistula is more pulsatile today than his last exam and this is suggestive of outflow stenosis which is evident on ultrasound in his upper forearm. He has focal area of stenosis which we will plan to treat with fistulogram and PTA.    -Patient has cannulated successfully with 1 needle  -Will plan for LUE fistulogram with PTA of focal area of stenosis in upper forearm this week      CARLI Thomason II, MD, [facility]  Vascular Surgery  [facility]

## 2022-01-03 NOTE — PROGRESS NOTES
VASCULAR SURGERY NOTE    Patient ID: Martin Hendrix Jr. is a 61 y.o. male.    I. HISTORY     Chief Complaint: post-op    HPI: Martin Hendrix Jr. is a 61 y.o. male who is here today for post-op appointment.  The patient has a left radiocephalic AV fistula in the distal forearm which was created by me on 10/13/2021.  He returned to the OR 11/18/21 for ligation of side branches because the fistula had not had adequate maturation at his follow up ultrasound. His has been cannulating with 1 needle for the past 10 days successfully. He does note some bruising at one of the cannulation sites.      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 Peacock    DM (diabetes mellitus) 11/2017     02/22/2021 Insulin x 2017    Hypertension     On home oxygen therapy     KRISTYN on CPAP     Osteopenia     Pancreatitis 2009    hospitalized    Pulmonary hypertension     Pure hypercholesterolemia 11/15/2017    Sarcoidosis     Shortness of breath     Type 2 diabetes mellitus 11/5/2017        Past Surgical History:   Procedure Laterality Date    ABDOMINAL SURGERY      6 weeks old    AV FISTULA PLACEMENT Left 10/13/2021    Procedure: CREATION, AV FISTULA;  Surgeon: CARLI Thomason II, MD;  Location: [facility] OR [address] [place] [address];  Service: Vascular;  Laterality: Left;    BRONCHOSCOPY      BRONCHOSCOPY N/A 8/22/2018    Procedure: flexible bronchoscopy with tissue biopsy CPT 42686;  Surgeon: [facility] Diagnostic Provider;  Location: [facility] OR [address] [place] [address];  Service: Endoscopy;  Laterality: N/A;    BRONCHOSCOPY N/A 11/20/2018    Procedure: flexible bronchoscopy with tissue biopy CPT 85383;  Surgeon: [facility] Diagnostic Provider;  [facility];  Service: Anesthesiology;  Laterality: N/A;    BRONCHOSCOPY N/A 11/10/2021    Procedure: Flexible bronchoscopy;   Surgeon: Samantha Bill DO;  Location: [facility] OR 2ND FLR;  Service: Endoscopy;  Laterality: N/A;    CHEST TUBE INSERTION      CHOLECYSTECTOMY      COLONOSCOPY      ESOPHAGOGASTRODUODENOSCOPY N/A 8/6/2018    Procedure: EGD (ESOPHAGOGASTRODUODENOSCOPY);  Surgeon: Ramu Eisenberg MD;  Location: [facility] ENDO (2ND FLR);  Service: Endoscopy;  Laterality: N/A;  off pepcid and all acid reducers for 2 weeks; PA > 50    INSERTION OF TUNNELED CENTRAL VENOUS HEMODIALYSIS CATHETER N/A 3/13/2019    Procedure: INSERTION, CATHETER, CENTRAL VENOUS, HEMODIALYSIS, TUNNELED;  Surgeon: Edy Gonzalez MD;  Location: [facility] CATH LAB;  Service: Nephrology;  Laterality: N/A;    INSERTION OF TUNNELED CENTRAL VENOUS HEMODIALYSIS CATHETER Right 5/7/2021    Procedure: Insertion, Catheter, Central Venous, Hemodialysis;  Surgeon: Mary Joshi MD;  Location: [facility] CATH LAB;  Service: Interventional Nephrology;  Laterality: Right;    LUNG BIOPSY      LUNG TRANSPLANT  08/2017    REMOVAL OF TUNNELED CENTRAL VENOUS CATHETER (CVC) N/A 5/16/2019    Procedure: REMOVAL, CATHETER, CENTRAL VENOUS, TUNNELED;  Surgeon: Edy Gonzalez MD;  Location: [facility] CATH LAB;  Service: Nephrology;  Laterality: N/A;    REVISION OF ARTERIOVENOUS FISTULA Left 11/18/2021    Procedure: REVISION, AV FISTULA;  Surgeon: CARLI Thomason II, MD;  [facility];  Service: Vascular;  Laterality: Left;  Ligation of side branches    TONSILLECTOMY       Social History     Occupational History    Not on file   Tobacco Use    Smoking status: Never Smoker    Smokeless tobacco: Never Used   Substance and Sexual Activity    Alcohol use: No    Drug use: No    Sexual activity: Not on file         ROS      II. PHYSICAL EXAM     Physical Exam  Transverse incisions on left forearm CDI well healed, areas of bruising and small hematoma in distal forearm  Fistula is pulsatile in the distal forearm and there is a continuous thrill in the upper forearm  suggestive of focal stenosis in the upper forearm    III. ASSESSMENT & PLAN (MEDICAL DECISION MAKING)     No diagnosis found.    Imaging Results:  (I have personally reviewed the imaging and provided my interpretation below)  LUE AVF U/S 11/8/21: Rediocephalic AVF is patient without evidence of stenosis. Fistula depth is <6mm but diameter is <5mm throughout and volume flow is 433ml/min. Radial artery waveform proximal to anastomosis with brisk upstroke without evidence of stenosis.      LUE AVF U/S 12/13/21:  Volume flow increased to 568ml/min. Diameter increased to 6-7mm throughout. Depth less than 6mm.     LUE AVF U/S 1/3/22: Volume flow 600ml/min. Focal velocity elevation in upper forearm PSV >800cm/s suggestive of hemodynamically significant stenosis.    Assessment/Diagnosis and Plan:  61 y.o. male with left radiocephalic AVF now with significantly improved maturation after ligation of side branches on 11/18/21. He has cannulated succesfully with 1 needle but has not yet cannulated with 2 needles.Volume flow is 600ml/min and diameter/depth are adequate for HD. His fistula is more pulsatile today than his last exam and this is suggestive of outflow stenosis which is evident on ultrasound in his upper forearm. He has focal area of stenosis which we will plan to treat with fistulogram and PTA.    -Patient has cannulated successfully with 1 needle  -Will plan for LUE fistulogram with PTA of focal area of stenosis in upper forearm this week      CARLI Thomason II, MD, [facility]  Vascular Surgery  [facility]

## 2022-01-04 ENCOUNTER — LAB VISIT (OUTPATIENT)
Dept: FAMILY MEDICINE | Facility: CLINIC | Age: 62
End: 2022-01-04
Payer: MEDICARE

## 2022-01-04 DIAGNOSIS — Z01.818 PRE-OP EVALUATION: ICD-10-CM

## 2022-01-04 LAB — SARS-COV-2 RNA RESP QL NAA+PROBE: NOT DETECTED

## 2022-01-04 PROCEDURE — U0003 INFECTIOUS AGENT DETECTION BY NUCLEIC ACID (DNA OR RNA); SEVERE ACUTE RESPIRATORY SYNDROME CORONAVIRUS 2 (SARS-COV-2) (CORONAVIRUS DISEASE [COVID-19]), AMPLIFIED PROBE TECHNIQUE, MAKING USE OF HIGH THROUGHPUT TECHNOLOGIES AS DESCRIBED BY CMS-2020-01-R: HCPCS | Performed by: SURGERY

## 2022-01-04 PROCEDURE — U0005 INFEC AGEN DETEC AMPLI PROBE: HCPCS | Performed by: SURGERY

## 2022-01-06 ENCOUNTER — DOCUMENTATION ONLY (OUTPATIENT)
Dept: TRANSPLANT | Facility: CLINIC | Age: 62
End: 2022-01-06
Payer: MEDICARE

## 2022-01-09 ENCOUNTER — LAB VISIT (OUTPATIENT)
Dept: FAMILY MEDICINE | Facility: CLINIC | Age: 62
End: 2022-01-09
Payer: MEDICARE

## 2022-01-09 DIAGNOSIS — Z01.812 PRE-PROCEDURE LAB EXAM: ICD-10-CM

## 2022-01-09 DIAGNOSIS — Z94.2 LUNG REPLACED BY TRANSPLANT: ICD-10-CM

## 2022-01-09 PROCEDURE — U0005 INFEC AGEN DETEC AMPLI PROBE: HCPCS | Performed by: INTERNAL MEDICINE

## 2022-01-09 PROCEDURE — U0003 INFECTIOUS AGENT DETECTION BY NUCLEIC ACID (DNA OR RNA); SEVERE ACUTE RESPIRATORY SYNDROME CORONAVIRUS 2 (SARS-COV-2) (CORONAVIRUS DISEASE [COVID-19]), AMPLIFIED PROBE TECHNIQUE, MAKING USE OF HIGH THROUGHPUT TECHNOLOGIES AS DESCRIBED BY CMS-2020-01-R: HCPCS | Performed by: INTERNAL MEDICINE

## 2022-01-10 ENCOUNTER — HOSPITAL ENCOUNTER (OUTPATIENT)
Facility: HOSPITAL | Age: 62
Discharge: HOME | End: 2022-01-10
Attending: SURGERY | Admitting: SURGERY
Payer: MEDICARE

## 2022-01-10 ENCOUNTER — TELEPHONE (OUTPATIENT)
Dept: TRANSPLANT | Facility: CLINIC | Age: 62
End: 2022-01-10
Payer: MEDICARE

## 2022-01-10 VITALS
WEIGHT: 220 LBS | HEART RATE: 95 BPM | SYSTOLIC BLOOD PRESSURE: 161 MMHG | RESPIRATION RATE: 20 BRPM | TEMPERATURE: 98 F | BODY MASS INDEX: 30.8 KG/M2 | HEIGHT: 71 IN | OXYGEN SATURATION: 96 % | DIASTOLIC BLOOD PRESSURE: 88 MMHG

## 2022-01-10 DIAGNOSIS — N18.6 ESRD (END STAGE RENAL DISEASE) ON DIALYSIS: ICD-10-CM

## 2022-01-10 DIAGNOSIS — Z99.2 ESRD (END STAGE RENAL DISEASE) ON DIALYSIS: ICD-10-CM

## 2022-01-10 PROBLEM — T82.858A STENOSIS OF AV FISTULA: Status: ACTIVE | Noted: 2022-01-10

## 2022-01-10 LAB
CTP QC/QA: YES
POCT GLUCOSE: 255 MG/DL (ref 70–110)
SARS-COV-2 AG RESP QL IA.RAPID: NEGATIVE
SARS-COV-2 RNA RESP QL NAA+PROBE: NOT DETECTED
SARS-COV-2- CYCLE NUMBER: NORMAL

## 2022-01-10 PROCEDURE — C1725 CATH, TRANSLUMIN NON-LASER: HCPCS | Performed by: SURGERY

## 2022-01-10 PROCEDURE — 82962 GLUCOSE BLOOD TEST: CPT | Performed by: SURGERY

## 2022-01-10 PROCEDURE — 99152 PR MOD CONSCIOUS SEDATION, SAME PHYS, 5+ YRS, FIRST 15 MIN: ICD-10-PCS | Mod: ,,, | Performed by: SURGERY

## 2022-01-10 PROCEDURE — 99152 MOD SED SAME PHYS/QHP 5/>YRS: CPT | Mod: ,,, | Performed by: SURGERY

## 2022-01-10 PROCEDURE — C1769 GUIDE WIRE: HCPCS | Performed by: SURGERY

## 2022-01-10 PROCEDURE — 36902 PR INTRO CATH, DIALYSIS CIRCUIT W/TRANSLML BALLOON ANGIO: ICD-10-PCS | Mod: 78,,, | Performed by: SURGERY

## 2022-01-10 PROCEDURE — C1894 INTRO/SHEATH, NON-LASER: HCPCS | Performed by: SURGERY

## 2022-01-10 PROCEDURE — 36902 INTRO CATH DIALYSIS CIRCUIT: CPT | Mod: 78,,, | Performed by: SURGERY

## 2022-01-10 PROCEDURE — 27201423 OPTIME MED/SURG SUP & DEVICES STERILE SUPPLY: Performed by: SURGERY

## 2022-01-10 PROCEDURE — 36902 INTRO CATH DIALYSIS CIRCUIT: CPT | Performed by: SURGERY

## 2022-01-10 RX ORDER — SODIUM CHLORIDE 9 MG/ML
INJECTION, SOLUTION INTRAVENOUS CONTINUOUS
Status: ACTIVE | OUTPATIENT
Start: 2022-01-10

## 2022-01-10 RX ORDER — ONDANSETRON 2 MG/ML
4 INJECTION INTRAMUSCULAR; INTRAVENOUS EVERY 12 HOURS PRN
Status: CANCELLED | OUTPATIENT
Start: 2022-01-10

## 2022-01-10 RX ORDER — HYDROCODONE BITARTRATE AND ACETAMINOPHEN 5; 325 MG/1; MG/1
1 TABLET ORAL EVERY 4 HOURS PRN
Status: CANCELLED | OUTPATIENT
Start: 2022-01-10

## 2022-01-10 RX ORDER — CEFAZOLIN SODIUM 2 G/50ML
2 SOLUTION INTRAVENOUS
Status: DISCONTINUED | OUTPATIENT
Start: 2022-01-10 | End: 2023-01-01

## 2022-01-10 NOTE — Clinical Note
40 ml of contrast were injected throughout the case. 60 mL of contrast was the total wasted during the case. 100 mL was the total amount used during the case.

## 2022-01-10 NOTE — PATIENT INSTRUCTIONS
VASCULAR SURGERY DISCHARGE INSTRUCTIONS    Woundcare:  - Take your incision dressing off 2 days after your surgery and gently rinse your incision with soap and water daily. Pad the incision dry afterward  - If you have a dialysis catheter in place, keep your catheter dressing clean and dry  - If you do not have a catheter, take a full shower daily beginning 2 days after the surgery. Allow soap and water to run over your incision. Pad the incision dry afterward  - When resting or sleeping, try to keep your arm elevated to shoulder level on pillows to reduce swelling  - If you notice clear drainage from your incision, you can apply dry gauze daily and secure in place with tape or gentle elastic wrap    Activity:  - Avoid prolonged exertion of the affected arm  - Avoid keeping your arm down below your chest for prolonged periods of time (this could lead to increased swelling)  - No heavy lifting with the affected arm  - Sleep with your arm elevated on pillows at night to reduce swelling  -- No swimming in pools, lakes, [place] etc. for 6 weeks after your surgery    Diet:  -Resume your pre-operative home diet    Follow up:  -Refer to follow up instructions     Call Vascular Surgery Office at [phone] if you experience:  -Increased redness, warmth, tenderness, or draining pus at your incision(s)  -Worsening fevers, chills, nausea/vomiting  -Pain, weakness, coldness, or numbness in your hand  -Uncontrolled pain  -Your call will be returned within 24 hours and further instructions will be provided    Go to ER/Urgent Care if you experience:  -Worsening shortness of breath or chest pain

## 2022-01-10 NOTE — TELEPHONE ENCOUNTER
TORB per Dr. Mcdonald for patient to hold everolimus until further notice due to concern for delayed wound healing from procedure today.  Notified patient of instruction to hold evero until further notice.  He will be seen in clinic on Wednesday.  Pt verbalized understanding.

## 2022-01-10 NOTE — BRIEF OP NOTE
[facility] - Cath Lab  Brief Operative Note    Surgery Date: 1/10/2022     Surgeon(s) and Role:     * CARLI Thomason II, MD - Primary    Assisting Surgeon: None    Pre-op Diagnosis:  ESRD (end stage renal disease) on dialysis [N18.6, Z99.2]    Post-op Diagnosis:  Post-Op Diagnosis Codes:     * ESRD (end stage renal disease) on dialysis [N18.6, Z99.2]    Procedure(s) (LRB):  Fistulogram (Left)  PTA, AV FISTULA (N/A)    Anesthesia: Local    Operative Findings: LUE fistula accessed under local anesthesia. Balloon angioplasty performed with adequate post-procedural fistula flow. See OP note for additional details.    Estimated Blood Loss: <10 mL         Specimens:   Specimen (24h ago, onward)            None            Discharge Note    OUTCOME: Patient tolerated treatment/procedure well without complication and is now ready for discharge.    DISPOSITION: Home or Self Care    FINAL DIAGNOSIS:  Stenosis of AV fistula    FOLLOWUP: In clinic in 1 week    DISCHARGE INSTRUCTIONS:  No discharge procedures on file.   Attached with Patient Instructions

## 2022-01-10 NOTE — PROGRESS NOTES
Pt discharged to home. Discharge instructions given to pt who voiced understanding.  IV removed catheter intact.  Denies pain or discomfort. Respirations even and unlabored.  Dressing to the left forearm clean, dry and intact.  No bleeding or hematoma noted.  Pt stable.  Refused wheelchair and ambulated off unit by self.

## 2022-01-10 NOTE — Clinical Note
The left radial was prepped. The site was prepped with ChloraPrep. The site was clipped. The patient was draped. The patient was positioned supine.

## 2022-01-10 NOTE — PROGRESS NOTES
Received pt back to floor from cath lab via wheelchair accompanied by staff.  AAO x 4. Denies pain or discomfort. Respirations even and unlabored. No distress noted.  Dressing to the left forearm fistula clean, dry and intact.  No bleeding or hematoma noted.  Left arm fistula + thrill, + bruit.  Pt stable.  Call bell within reach.

## 2022-01-12 ENCOUNTER — HOSPITAL ENCOUNTER (OUTPATIENT)
Dept: PULMONOLOGY | Facility: HOSPITAL | Age: 62
Discharge: HOME | End: 2022-01-12
Attending: INTERNAL MEDICINE
Payer: MEDICARE

## 2022-01-12 ENCOUNTER — OFFICE VISIT (OUTPATIENT)
Dept: TRANSPLANT | Facility: CLINIC | Age: 62
End: 2022-01-12
Payer: MEDICARE

## 2022-01-12 ENCOUNTER — HOSPITAL ENCOUNTER (OUTPATIENT)
Dept: RADIOLOGY | Facility: HOSPITAL | Age: 62
Discharge: HOME | End: 2022-01-12
Attending: INTERNAL MEDICINE
Payer: MEDICARE

## 2022-01-12 VITALS
HEART RATE: 92 BPM | OXYGEN SATURATION: 97 % | RESPIRATION RATE: 20 BRPM | BODY MASS INDEX: 30.62 KG/M2 | WEIGHT: 213.88 LBS | DIASTOLIC BLOOD PRESSURE: 86 MMHG | HEIGHT: 70 IN | TEMPERATURE: 96 F | SYSTOLIC BLOOD PRESSURE: 148 MMHG

## 2022-01-12 DIAGNOSIS — Z94.2 LUNG REPLACED BY TRANSPLANT: ICD-10-CM

## 2022-01-12 DIAGNOSIS — Z79.2 PROPHYLACTIC ANTIBIOTIC: ICD-10-CM

## 2022-01-12 DIAGNOSIS — N18.6 ESRD (END STAGE RENAL DISEASE): ICD-10-CM

## 2022-01-12 DIAGNOSIS — J96.12 CHRONIC RESPIRATORY FAILURE WITH HYPOXIA AND HYPERCAPNIA: ICD-10-CM

## 2022-01-12 DIAGNOSIS — J96.11 CHRONIC RESPIRATORY FAILURE WITH HYPOXIA AND HYPERCAPNIA: ICD-10-CM

## 2022-01-12 DIAGNOSIS — D84.9 IMMUNOSUPPRESSION: ICD-10-CM

## 2022-01-12 DIAGNOSIS — T86.818 BRONCHIOLITIS OBLITERANS SYNDROME DUE TO LUNG TRANSPLANTATION: ICD-10-CM

## 2022-01-12 DIAGNOSIS — Z79.2 NEED FOR PROPHYLACTIC ANTIBIOTIC: ICD-10-CM

## 2022-01-12 DIAGNOSIS — J44.81 BRONCHIOLITIS OBLITERANS SYNDROME DUE TO LUNG TRANSPLANTATION: ICD-10-CM

## 2022-01-12 DIAGNOSIS — Z48.24 ENCOUNTER FOR AFTERCARE FOLLOWING LUNG TRANSPLANT: Primary | ICD-10-CM

## 2022-01-12 LAB — POCT GLUCOSE: 199 MG/DL (ref 70–110)

## 2022-01-12 PROCEDURE — 71046 X-RAY EXAM CHEST 2 VIEWS: CPT | Mod: TC

## 2022-01-12 PROCEDURE — 3008F BODY MASS INDEX DOCD: CPT | Mod: CPTII,S$GLB,, | Performed by: INTERNAL MEDICINE

## 2022-01-12 PROCEDURE — 1160F RVW MEDS BY RX/DR IN RCRD: CPT | Mod: CPTII,S$GLB,, | Performed by: INTERNAL MEDICINE

## 2022-01-12 PROCEDURE — 71046 XR CHEST PA AND LATERAL: ICD-10-PCS | Mod: 26,,, | Performed by: RADIOLOGY

## 2022-01-12 PROCEDURE — 3079F DIAST BP 80-89 MM HG: CPT | Mod: CPTII,S$GLB,, | Performed by: INTERNAL MEDICINE

## 2022-01-12 PROCEDURE — 94010 BREATHING CAPACITY TEST: ICD-10-PCS | Mod: 26,,, | Performed by: INTERNAL MEDICINE

## 2022-01-12 PROCEDURE — 3008F PR BODY MASS INDEX (BMI) DOCUMENTED: ICD-10-PCS | Mod: CPTII,S$GLB,, | Performed by: INTERNAL MEDICINE

## 2022-01-12 PROCEDURE — 3079F PR MOST RECENT DIASTOLIC BLOOD PRESSURE 80-89 MM HG: ICD-10-PCS | Mod: CPTII,S$GLB,, | Performed by: INTERNAL MEDICINE

## 2022-01-12 PROCEDURE — 94010 BREATHING CAPACITY TEST: CPT | Mod: 26,,, | Performed by: INTERNAL MEDICINE

## 2022-01-12 PROCEDURE — 99215 OFFICE O/P EST HI 40 MIN: CPT | Mod: 25,S$GLB,, | Performed by: INTERNAL MEDICINE

## 2022-01-12 PROCEDURE — 3077F PR MOST RECENT SYSTOLIC BLOOD PRESSURE >= 140 MM HG: ICD-10-PCS | Mod: CPTII,S$GLB,, | Performed by: INTERNAL MEDICINE

## 2022-01-12 PROCEDURE — 71046 X-RAY EXAM CHEST 2 VIEWS: CPT | Mod: 26,,, | Performed by: RADIOLOGY

## 2022-01-12 PROCEDURE — 3077F SYST BP >= 140 MM HG: CPT | Mod: CPTII,S$GLB,, | Performed by: INTERNAL MEDICINE

## 2022-01-12 PROCEDURE — 1160F PR REVIEW ALL MEDS BY PRESCRIBER/CLIN PHARMACIST DOCUMENTED: ICD-10-PCS | Mod: CPTII,S$GLB,, | Performed by: INTERNAL MEDICINE

## 2022-01-12 PROCEDURE — 1159F PR MEDICATION LIST DOCUMENTED IN MEDICAL RECORD: ICD-10-PCS | Mod: CPTII,S$GLB,, | Performed by: INTERNAL MEDICINE

## 2022-01-12 PROCEDURE — 3072F LOW RISK FOR RETINOPATHY: CPT | Mod: CPTII,S$GLB,, | Performed by: INTERNAL MEDICINE

## 2022-01-12 PROCEDURE — 99215 PR OFFICE/OUTPT VISIT, EST, LEVL V, 40-54 MIN: ICD-10-PCS | Mod: 25,S$GLB,, | Performed by: INTERNAL MEDICINE

## 2022-01-12 PROCEDURE — 1159F MED LIST DOCD IN RCRD: CPT | Mod: CPTII,S$GLB,, | Performed by: INTERNAL MEDICINE

## 2022-01-12 PROCEDURE — 99999 PR PBB SHADOW E&M-EST. PATIENT-LVL III: ICD-10-PCS | Mod: PBBFAC,,, | Performed by: INTERNAL MEDICINE

## 2022-01-12 PROCEDURE — 3072F PR LOW RISK FOR RETINOPATHY: ICD-10-PCS | Mod: CPTII,S$GLB,, | Performed by: INTERNAL MEDICINE

## 2022-01-12 PROCEDURE — 99999 PR PBB SHADOW E&M-EST. PATIENT-LVL III: CPT | Mod: PBBFAC,,, | Performed by: INTERNAL MEDICINE

## 2022-01-12 RX ORDER — SULFAMETHOXAZOLE AND TRIMETHOPRIM 400; 80 MG/1; MG/1
1 TABLET ORAL
Qty: 15 TABLET | Refills: 11 | Status: SHIPPED | OUTPATIENT
Start: 2022-01-12 | End: 2023-01-01 | Stop reason: SDUPTHER

## 2022-01-12 RX ORDER — TACROLIMUS 0.5 MG/1
3 CAPSULE ORAL EVERY 12 HOURS
Qty: 360 CAPSULE | Refills: 11 | Status: ON HOLD | OUTPATIENT
Start: 2022-01-12 | End: 2022-02-15 | Stop reason: SDUPTHER

## 2022-01-12 RX ORDER — EVEROLIMUS 0.75 MG/1
0.75 TABLET ORAL EVERY 12 HOURS
Qty: 60 TABLET | Refills: 11 | Status: SHIPPED | OUTPATIENT
Start: 2022-01-12 | End: 2023-01-01 | Stop reason: SDUPTHER

## 2022-01-12 NOTE — TELEPHONE ENCOUNTER
Instructed pt to continue Tac at the dose he has been taking 3 mg BID (6 capsules BID) and to restart evero at 0.75 mg BID (pt only held one day), no need to repeat labs.  He verbalized understanding.    ----- Message from Samantha Bill DO sent at 1/12/2022 12:02 PM CST -----  No changes to tac can continue 3mg BID.  OK to resume evero.

## 2022-01-12 NOTE — PROGRESS NOTES
LUNG TRANSPLANT RECIPIENT FOLLOW-UP     Reason for Visit: Follow-up after lung transplantation.                               Date of Transplant: 8/22/17     Reason for Transplant: Sarcoidosis with pulmonary hypertension     Type of Transplant: bilateral sequential lung     CMV Status: D+ / R-      Major Complications:   1. Left vocal cord dysfunction   2. 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  A1- 1/2021  6.  Increasing DSA noted in 02/2021- s/p tx in 04/2021 with PLEX/IVIG/MP and ritux                                                                                History of Present Illness: Martin Hendrix Jr. is a 60 y.o. year old male with the above stated history.  He is on 2L of oxygen. She is on no assisted ventilation.  His New York Heart Association Class is III and a Karnofsky score of 70% - Cares for self: Unable to carry on normal activity or active work.   He is diabetic insulin dependent.    Since his last visit, he has been doing well.  Had a recent AVF repair and tolerated that well.  Has been holding evero due to his recent surgery.  Respiratory symptoms are stable.  Tolerating HD and feels like his symptoms are improved since initiating HD and volume has been removed.  Wears 2L O2.  Was told he might need BiPAP therapy in the past but has not received any machine.  Overall, doing well.      Review of Systems   Constitutional: Negative for chills, diaphoresis, fever, malaise/fatigue and weight loss.   HENT: Negative for congestion, ear discharge, ear pain, hearing loss, nosebleeds, sinus pain, sore throat and tinnitus.    Eyes: Negative for blurred vision, double vision, photophobia, pain, discharge and redness.   Respiratory: Positive for shortness of breath (with exertion). Negative for cough, hemoptysis, sputum production, wheezing and  "stridor.    Cardiovascular: Negative for chest pain, palpitations, orthopnea, claudication, leg swelling and PND.   Gastrointestinal: Negative for abdominal pain, blood in stool, constipation, diarrhea, heartburn, melena, nausea and vomiting.   Genitourinary: Negative for dysuria, flank pain, frequency, hematuria and urgency.        Decreased urination since kidney failure  Musculoskeletal: Negative for back pain, falls, joint pain, myalgias and neck pain.   Skin: Negative for itching and rash.   Neurological: Negative for dizziness (notes occasional lightheadedness after HD), tingling, tremors, sensory change, speech change, focal weakness, seizures, loss of consciousness, weakness and headaches.   Endo/Heme/Allergies: Negative for environmental allergies and polydipsia. Does not bruise/bleed easily.   Psychiatric/Behavioral: Negative for depression, hallucinations, memory loss, substance abuse and suicidal ideas. The patient is not nervous/anxious and does not have insomnia.      BP (!) 148/86   Pulse 92   Temp 96.3 °F (35.7 °C) (Oral)   Resp 20   Ht 5' 10" (1.778 m)   Wt 97 kg (213 lb 13.5 oz)   SpO2 97% Comment: 2 liters  BMI 30.68 kg/m²     Physical Exam  Vitals and nursing note reviewed.   Constitutional:       General: He is not in acute distress.     Appearance: He is not ill-appearing or diaphoretic.   HENT:      Head: Normocephalic and atraumatic.      Nose: Nose normal.      Mouth/Throat:      Pharynx: No oropharyngeal exudate.   Eyes:      General: No scleral icterus.     Extraocular Movements: Extraocular movements intact.      Conjunctiva/sclera: Conjunctivae normal.   Neck:      Thyroid: No thyromegaly.      Vascular: No JVD.      Trachea: No tracheal deviation.   Cardiovascular:      Rate and Rhythm: Normal rate and regular rhythm.      Heart sounds: Murmur (2/6 systolic murmur best heard at LUSB) heard.     Pulmonary:      Effort: Pulmonary effort is normal. No respiratory distress.      " Breath sounds: No stridor.  No wheezing or rhonchi.   Chest:      Chest wall: No tenderness.   Abdominal:      General: Bowel sounds are normal. There is no distension.      Palpations: Abdomen is soft. There is no mass.      Tenderness: There is no abdominal tenderness. There is no guarding or rebound.   Musculoskeletal:         General: No tenderness or deformity. Normal range of motion.      Cervical back: Normal range of motion and neck supple.   Lymphadenopathy:      Cervical: No cervical adenopathy.   Skin:     General: Skin is warm and dry.      Coloration: Skin is not pale.      Findings: No erythema or rash.   Neurological:      Mental Status: He is alert and oriented to person, place, and time.      Cranial Nerves: No cranial nerve deficit.      Coordination: Coordination normal.      Gait: Gait is intact.   Psychiatric:         Mood and Affect: Mood and affect normal.         Cognition and Memory: Memory normal.         Judgment: Judgment normal.       Labs:  cbc, cmp, tacrolimus Latest Ref Rng & Units 12/8/2021 12/13/2021 1/12/2022   TACROLIMUS LVL 5.0 - 15.0 ng/mL 2.6(L) 4.7(L) -   WHITE BLOOD CELL COUNT 3.90 - 12.70 K/uL 10.99 - -   RBC 4.60 - 6.20 M/uL 4.53(L) - -   HEMOGLOBIN 14.0 - 18.0 g/dL 13.4(L) - -   HEMATOCRIT 40.0 - 54.0 % 42.5 - -   MCV 82 - 98 fL 94 - -   MCH 27.0 - 31.0 pg 29.6 - -   MCHC 32.0 - 36.0 g/dL 31.5(L) - -   RDW 11.5 - 14.5 % 15.1(H) - -   PLATELETS 150 - 450 K/uL 299 - -   MPV 9.2 - 12.9 fL 8.3(L) - -   GRAN # 1.8 - 7.7 K/uL 6.9 - -   LYMPH # 1.0 - 4.8 K/uL 1.9 - -   MONO # 0.3 - 1.0 K/uL 1.2(H) - -   EOSINOPHIL% 0.0 - 8.0 % 7.4 - -   BASOPHIL% 0.0 - 1.9 % 0.5 - -   DIFFERENTIAL METHOD - Automated - -   SODIUM 136 - 145 mmol/L 134(L) - 141   POTASSIUM 3.5 - 5.1 mmol/L 4.4 - 4.8   CHLORIDE 95 - 110 mmol/L 92(L) - 97   CO2 23 - 29 mmol/L 28 - 31(H)   GLUCOSE 70 - 110 mg/dL 191(H) - 144(H)   BUN BLD 6 - 20 mg/dL 54(H) - 30(H)   CREATININE 0.5 - 1.4 mg/dL 8.0(H) - 8.2(H)   CALCIUM  8.7 - 10.5 mg/dL 8.1(L) - 8.9   PROTEIN TOTAL 6.0 - 8.4 g/dL 6.8 - 7.1   ALBUMIN 3.5 - 5.2 g/dL 3.8 - 4.2   BILIRUBIN TOTAL 0.1 - 1.0 mg/dL 0.6 - 0.6   ALK PHOS 55 - 135 U/L 100 - 111   AST 10 - 40 U/L 13 - 15   ALT 10 - 44 U/L 15 - 16   ANION GAP 8 - 16 mmol/L 14 - 13   EGFR IF AFRICAN AMERICAN >60 mL/min/1.73 m:2 7.7(A) - 7.4(A)   EGFR IF NON-AFRICAN AMERICAN >60 mL/min/1.73 m:2 6.6(A) - 6.4(A)     Pulmonary Function Tests 1/12/2022 12/8/2021 11/8/2021 10/6/2021 9/20/2021 8/16/2021 7/7/2021   FVC 1.65 1.66 1.65 1.66 1.87 1.79 1.84   FEV1 1.06 1.09 1.06 1.12 1.26 1.27 1.33   TLC (liters) - - - - - - -   DLCO (ml/mmHg sec) - - - - - - -   FVC% 35.2 35.4 35.2 35.3 39.9 38.2 39.2   FEV1% 29.3 30.2 29.3 30.9 34.7 34.8 36.5   FEF 25-75 0.54 0.58 0.53 0.62 0.68 0.81 0.88   FEF 25-75% 17.6 19 17.2 20.4 22.2 26.4 28.7   TLC% - - - - - - -   RV - - - - - - -   RV% - - - - - - -   DLCO% - - - - - - -         Imaging:    CT CHEST 11/08/21  1. Worsening patchy ground-glass attenuation and consolidation within the posterior right middle lobe.  Interval resolution of multiple bilateral solid and sub solid opacities.  Waxing and waning imaging characteristics suggest sequela of an infectious or inflammatory process.  Continued follow-up recommended.  2. Stable subsegmental atelectasis within the lingula and bilateral lower lobes.  3. Additional stable findings as detailed in the body of the report.    Results for orders placed during the hospital encounter of 10/06/21    Results for orders placed during the hospital encounter of 12/08/21    X-Ray Chest PA And Lateral    Narrative  EXAMINATION:  XR CHEST PA AND LATERAL    CLINICAL HISTORY:  BSLT 8/22/2017; Lung transplant status    TECHNIQUE:  PA and lateral views of the chest were performed.    COMPARISON:  10/06/2021.  CT chest 11/08/2021.    FINDINGS:  Right internal jugular approach central venous catheter appears in stable position.  Postoperative changes of median sternotomy  wire for bilateral lung transplantation.    Stable mild pulmonary venous congestion.  Additional stable linear opacities in the basal lung zones, likely subsegmental atelectasis.  Small bilateral pleural effusions, unchanged.  There is no new consolidation or pneumothorax.    Cardiomediastinal silhouette is unchanged.  Regional osseous structures are unremarkable.    Impression  Mild pulmonary edema with small pleural effusions.  Consider volume overload.    Electronically signed by resident: Rich Regan  Date:    12/08/2021  Time:    08:19    Electronically signed by: Cris Maher  Date:    12/08/2021  Time:    08:45        Assessment:  1. Encounter for aftercare following lung transplant     2. 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 Lung replaced by transplant     4. Immunosuppression     5. Prophylactic antibiotic     6. ESRD (end stage renal disease)     7. Chronic respiratory failure with hypoxia and hypercapnia          Plan:   1. FEV1 remains at ~40% of his baseline, but stable overall. Noted to be hypercapnic on ABG from November visit and concerns for progression of his CLAD. He has had persistent DSAs which were negative C1Q. Recently received pulse steroids as outpatient for eosinophils on 11/10 bronchoscopy. Started on everolimus at last visit. CXR without acute changes.  Remains symptomatically stable with known IMTIAZ.  Continue with current plans of care.       2. Continue tacrolimus 3 mg BID, everolimus 0.75 mg daily (currently on hold due to recent surgery), prednisone 5 mg daily. 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 Will review tacrolimus level and adjust dose per trough. Continue azithromycin MWF.     3. Continue bactrim SS.     5. Continue HD TThS.     6. Continue diabetes regimen per endocrinology.     7. Instructed to use oxygen as prescribed. Has not received NIPPV for hypercapnea.  Will repeat ABG at next visit for qualification.     8. Continue PPI.     9. RTC  in 1 month or sooner if needed.       Samantha Bill D.O.  Pulmonary/Critical Care and Lung Transplantation  [facility]

## 2022-01-13 LAB
FEF 25 75 LLN: 1.51
FEF 25 75 PRE REF: 17.6 %
FEF 25 75 REF: 3.04
FEV05 LLN: 1.69
FEV05 REF: 2.83
FEV1 FVC LLN: 66
FEV1 FVC PRE REF: 83.1 %
FEV1 FVC REF: 78
FEV1 LLN: 2.74
FEV1 PRE REF: 29.3 %
FEV1 REF: 3.62
FVC LLN: 3.58
FVC PRE REF: 35.2 %
FVC REF: 4.68
PEF LLN: 6.98
PEF PRE REF: 43.4 %
PEF REF: 9.3
PRE FEF 25 75: 0.54 L/S (ref 1.51–5.11)
PRE FET 100: 6.1 SEC
PRE FEV05 REF: 29.8 %
PRE FEV1 FVC: 64.51 % (ref 65.74–87.95)
PRE FEV1: 1.06 L (ref 2.74–4.45)
PRE FEV5: 0.84 L (ref 1.69–3.96)
PRE FVC: 1.65 L (ref 3.58–5.79)
PRE PEF: 4.03 L/S (ref 6.98–11.61)

## 2022-01-21 DIAGNOSIS — Z01.812 PRE-PROCEDURE LAB EXAM: ICD-10-CM

## 2022-01-21 DIAGNOSIS — Z94.2 LUNG REPLACED BY TRANSPLANT: Primary | ICD-10-CM

## 2022-01-24 ENCOUNTER — OFFICE VISIT (OUTPATIENT)
Dept: VASCULAR SURGERY | Facility: CLINIC | Age: 62
End: 2022-01-24
Attending: SURGERY
Payer: MEDICARE

## 2022-01-24 VITALS
HEART RATE: 100 BPM | WEIGHT: 222.69 LBS | TEMPERATURE: 98 F | BODY MASS INDEX: 31.18 KG/M2 | HEIGHT: 71 IN | DIASTOLIC BLOOD PRESSURE: 81 MMHG | SYSTOLIC BLOOD PRESSURE: 138 MMHG

## 2022-01-24 DIAGNOSIS — Z99.2 S/P HEMODIALYSIS CATHETER INSERTION: Primary | ICD-10-CM

## 2022-01-24 PROCEDURE — 1159F PR MEDICATION LIST DOCUMENTED IN MEDICAL RECORD: ICD-10-PCS | Mod: CPTII,S$GLB,, | Performed by: SURGERY

## 2022-01-24 PROCEDURE — 36589 PR REMOVAL TUNNELED CV CATH W/O SUBQ PORT OR PUMP: ICD-10-PCS | Mod: 58,S$GLB,, | Performed by: SURGERY

## 2022-01-24 PROCEDURE — 3008F BODY MASS INDEX DOCD: CPT | Mod: CPTII,S$GLB,, | Performed by: SURGERY

## 2022-01-24 PROCEDURE — 3075F PR MOST RECENT SYSTOLIC BLOOD PRESS GE 130-139MM HG: ICD-10-PCS | Mod: CPTII,S$GLB,, | Performed by: SURGERY

## 2022-01-24 PROCEDURE — 99999 PR PBB SHADOW E&M-EST. PATIENT-LVL III: ICD-10-PCS | Mod: PBBFAC,,, | Performed by: SURGERY

## 2022-01-24 PROCEDURE — 99999 PR PBB SHADOW E&M-EST. PATIENT-LVL III: CPT | Mod: PBBFAC,,, | Performed by: SURGERY

## 2022-01-24 PROCEDURE — 3075F SYST BP GE 130 - 139MM HG: CPT | Mod: CPTII,S$GLB,, | Performed by: SURGERY

## 2022-01-24 PROCEDURE — 3079F DIAST BP 80-89 MM HG: CPT | Mod: CPTII,S$GLB,, | Performed by: SURGERY

## 2022-01-24 PROCEDURE — 3079F PR MOST RECENT DIASTOLIC BLOOD PRESSURE 80-89 MM HG: ICD-10-PCS | Mod: CPTII,S$GLB,, | Performed by: SURGERY

## 2022-01-24 PROCEDURE — 36589 REMOVAL TUNNELED CV CATH: CPT | Mod: 58,S$GLB,, | Performed by: SURGERY

## 2022-01-24 PROCEDURE — 99024 POSTOP FOLLOW-UP VISIT: CPT | Mod: S$GLB,,, | Performed by: SURGERY

## 2022-01-24 PROCEDURE — 1159F MED LIST DOCD IN RCRD: CPT | Mod: CPTII,S$GLB,, | Performed by: SURGERY

## 2022-01-24 PROCEDURE — 3072F LOW RISK FOR RETINOPATHY: CPT | Mod: CPTII,S$GLB,, | Performed by: SURGERY

## 2022-01-24 PROCEDURE — 3072F PR LOW RISK FOR RETINOPATHY: ICD-10-PCS | Mod: CPTII,S$GLB,, | Performed by: SURGERY

## 2022-01-24 PROCEDURE — 3008F PR BODY MASS INDEX (BMI) DOCUMENTED: ICD-10-PCS | Mod: CPTII,S$GLB,, | Performed by: SURGERY

## 2022-01-24 PROCEDURE — 99024 PR POST-OP FOLLOW-UP VISIT: ICD-10-PCS | Mod: S$GLB,,, | Performed by: SURGERY

## 2022-01-24 NOTE — PROGRESS NOTES
VASCULAR SURGERY NOTE    Patient ID: Martin Hendrix Jr. is a 61 y.o. male.    I. HISTORY     Chief Complaint: post-op    HPI: Martin Hendrix Jr. is a 61 y.o. male who is here today for post-op appointment.  The patient has a left radiocephalic AV fistula in the distal forearm which was created by me on 10/13/2021.  He returned to the OR 11/18/21 for ligation of side branches because the fistula had not had adequate maturation at his follow up ultrasound. He then was found to have focal stenosis on ultrasound which taken to OR for fistulagram and PTA with resolution of stenosis. He has been cannulating/dialyzing through his AVF without issues. He returns for catheter removal today.      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 Peacock    DM (diabetes mellitus) 11/2017     02/22/2021 Insulin x 2017    Hypertension     On home oxygen therapy     KRISTYN on CPAP     Osteopenia     Pancreatitis 2009    hospitalized    Pulmonary hypertension     Pure hypercholesterolemia 11/15/2017    Sarcoidosis     Shortness of breath     Type 2 diabetes mellitus 11/5/2017        Past Surgical History:   Procedure Laterality Date    ABDOMINAL SURGERY      6 weeks old    AV FISTULA PLACEMENT Left 10/13/2021    Procedure: CREATION, AV FISTULA;  Surgeon: CARLI Thomason II, MD;  Location: [address];  Service: Vascular;  Laterality: Left;    BRONCHOSCOPY      BRONCHOSCOPY N/A 8/22/2018    Procedure: flexible bronchoscopy with tissue biopsy CPT 71625;  Surgeon: [facility] Diagnostic Provider;  Location: [address];  Service: Endoscopy;  Laterality: N/A;    BRONCHOSCOPY N/A 11/20/2018    Procedure: flexible bronchoscopy with tissue biopy CPT 46883;  Surgeon: [facility] Diagnostic Provider;  Location: [facility] OR [address] [place] [address];  Service:  Anesthesiology;  Laterality: N/A;    BRONCHOSCOPY N/A 11/10/2021    Procedure: Flexible bronchoscopy;  Surgeon: Samantha Bill DO;  [facility];  Service: Endoscopy;  Laterality: N/A;    CHEST TUBE INSERTION      CHOLECYSTECTOMY      COLONOSCOPY      ESOPHAGOGASTRODUODENOSCOPY N/A 8/6/2018    Procedure: EGD (ESOPHAGOGASTRODUODENOSCOPY);  Surgeon: Ramu Eisenberg MD;  Location: [facility] ENDO (2ND FLR);  Service: Endoscopy;  Laterality: N/A;  off pepcid and all acid reducers for 2 weeks; PA > 50    FISTULOGRAM Left 1/10/2022    Procedure: Fistulogram;  Surgeon: CARLI Thomason II, MD;  Location: [facility] CATH LAB;  Service: Vascular;  Laterality: Left;    INSERTION OF TUNNELED CENTRAL VENOUS HEMODIALYSIS CATHETER N/A 3/13/2019    Procedure: INSERTION, CATHETER, CENTRAL VENOUS, HEMODIALYSIS, TUNNELED;  Surgeon: Edy Gonzalez MD;  Location: [facility] CATH LAB;  Service: Nephrology;  Laterality: N/A;    INSERTION OF TUNNELED CENTRAL VENOUS HEMODIALYSIS CATHETER Right 5/7/2021    Procedure: Insertion, Catheter, Central Venous, Hemodialysis;  Surgeon: Mary Joshi MD;  Location: [facility] CATH LAB;  Service: Interventional Nephrology;  Laterality: Right;    LUNG BIOPSY      LUNG TRANSPLANT  08/2017    PERCUTANEOUS TRANSLUMINAL ANGIOPLASTY OF ARTERIOVENOUS FISTULA N/A 1/10/2022    Procedure: PTA, AV FISTULA;  Surgeon: CARLI Thomason II, MD;  Location: [facility] CATH LAB;  Service: Vascular;  Laterality: N/A;    REMOVAL OF TUNNELED CENTRAL VENOUS CATHETER (CVC) N/A 5/16/2019    Procedure: REMOVAL, CATHETER, CENTRAL VENOUS, TUNNELED;  Surgeon: Edy Gonzalez MD;  Location: [facility] CATH LAB;  Service: Nephrology;  Laterality: N/A;    REVISION OF ARTERIOVENOUS FISTULA Left 11/18/2021    Procedure: REVISION, AV FISTULA;  Surgeon: CALRI Thomason II, MD;  Location: [facility] OR 2ND FLR;  Service: Vascular;  Laterality: Left;  Ligation of side branches    TONSILLECTOMY       Social History     Occupational  History    Not on file   Tobacco Use    Smoking status: Never Smoker    Smokeless tobacco: Never Used   Substance and Sexual Activity    Alcohol use: No    Drug use: No    Sexual activity: Not on file         ROS      II. PHYSICAL EXAM     Physical Exam  continunous thrill in AVF, small marble sized hematoma at recent area of infiltration    III. ASSESSMENT & PLAN (MEDICAL DECISION MAKING)     1. S/P hemodialysis catheter insertion        Imaging Results:  (I have personally reviewed the imaging and provided my interpretation below)  LUE AVF U/S 12/13/21:  Volume flow increased to 568ml/min. Diameter increased to 6-7mm throughout. Depth less than 6mm.     LUE AVF U/S 1/3/22: Volume flow 600ml/min. Focal velocity elevation in upper forearm PSV >800cm/s suggestive of hemodynamically significant stenosis.    Assessment/Diagnosis and Plan:  61 y.o. male with left radiocephalic AVF s/p ligation of side branches and PTA of mid-forearm stenosis.  Patient is doing well with an dialyzing through his AV fistula without issues.  Will plan to remove his tunneled dialysis catheter in clinic today.  Please see procedure note below.      -RTC 3 months for surveillance u/s of AVF      Vascular Surgery Procedure Note     Date of Operation/Procedure:  01/27/2022      Pre-operative Diagnosis: presence of tunneled dialysis catheter     Post-operative Diagnosis: same     Anesthesia: local     Operation/Procedure Performed: removal of right IJ tunneled dialysis catheter     Attending Surgeon: CARLI Thomason II, MD     Resident/Fellow: Montserrat Abdul MD (Res)     Indications: Patient successfully using permanent AV access for HD. No longer in need of HD catheter.     Procedure in Detail:   The patient was positioned on the table in supine position with the head of the bed at approximately 30 degrees. The area over the catheter tunnel and catheter exit site on the right chest was prepped and draped in normal sterile fashion. The  cuff could be palpated <1cm from the catheter exit site. For local anesthesia 5cc of 1% lidocaine with epinephrine was injected into the subcutaneous tissues at the catheter exit site. Tension was applied to the catheter and the tissues adherent to the cuff were divided with scissors and blunt dissection. Once the tissues were divided circumferentially from the cuff, the catheter was removed intact through the catheter exit site. Manual pressure was held on the neck at the point of venous entry for 10 minutes. The catheter exit site was dressed with 4x4 gauze and tegaderm.  The patient tolerated the procedure well.     Estimated Blood loss: 10ml     Complications: none    CARLI Thomason II, MD, RPVI  Vascular Surgery  [facility]

## 2022-01-28 DIAGNOSIS — Z99.2 ESRD (END STAGE RENAL DISEASE) ON DIALYSIS: Primary | ICD-10-CM

## 2022-01-28 DIAGNOSIS — N18.6 ESRD (END STAGE RENAL DISEASE) ON DIALYSIS: Primary | ICD-10-CM

## 2022-02-07 ENCOUNTER — TELEPHONE (OUTPATIENT)
Dept: NEPHROLOGY | Facility: CLINIC | Age: 62
End: 2022-02-07
Payer: MEDICARE

## 2022-02-07 ENCOUNTER — LAB VISIT (OUTPATIENT)
Dept: LAB | Facility: HOSPITAL | Age: 62
End: 2022-02-07
Attending: INTERNAL MEDICINE
Payer: MEDICARE

## 2022-02-07 ENCOUNTER — TELEPHONE (OUTPATIENT)
Dept: TRANSPLANT | Facility: CLINIC | Age: 62
End: 2022-02-07
Payer: MEDICARE

## 2022-02-07 DIAGNOSIS — E87.5 HYPERKALEMIA: ICD-10-CM

## 2022-02-07 DIAGNOSIS — E11.59 HYPERTENSION ASSOCIATED WITH DIABETES: ICD-10-CM

## 2022-02-07 DIAGNOSIS — I15.2 HYPERTENSION ASSOCIATED WITH DIABETES: ICD-10-CM

## 2022-02-07 DIAGNOSIS — E87.5 HYPERKALEMIA: Primary | ICD-10-CM

## 2022-02-07 LAB
BASOPHILS # BLD AUTO: 0.03 K/UL (ref 0–0.2)
BASOPHILS NFR BLD: 0.4 % (ref 0–1.9)
DIFFERENTIAL METHOD: ABNORMAL
EOSINOPHIL # BLD AUTO: 0.1 K/UL (ref 0–0.5)
EOSINOPHIL NFR BLD: 0.8 % (ref 0–8)
ERYTHROCYTE [DISTWIDTH] IN BLOOD BY AUTOMATED COUNT: 13.7 % (ref 11.5–14.5)
HCT VFR BLD AUTO: 36.8 % (ref 40–54)
HGB BLD-MCNC: 11.4 G/DL (ref 14–18)
IMM GRANULOCYTES # BLD AUTO: 0.02 K/UL (ref 0–0.04)
IMM GRANULOCYTES NFR BLD AUTO: 0.2 % (ref 0–0.5)
LYMPHOCYTES # BLD AUTO: 3 K/UL (ref 1–4.8)
LYMPHOCYTES NFR BLD: 35.7 % (ref 18–48)
MCH RBC QN AUTO: 29.8 PG (ref 27–31)
MCHC RBC AUTO-ENTMCNC: 31 G/DL (ref 32–36)
MCV RBC AUTO: 96 FL (ref 82–98)
MONOCYTES # BLD AUTO: 1.1 K/UL (ref 0.3–1)
MONOCYTES NFR BLD: 12.6 % (ref 4–15)
NEUTROPHILS # BLD AUTO: 4.2 K/UL (ref 1.8–7.7)
NEUTROPHILS NFR BLD: 50.3 % (ref 38–73)
NRBC BLD-RTO: 0 /100 WBC
PLATELET # BLD AUTO: 199 K/UL (ref 150–450)
PMV BLD AUTO: 9.4 FL (ref 9.2–12.9)
RBC # BLD AUTO: 3.83 M/UL (ref 4.6–6.2)
WBC # BLD AUTO: 8.42 K/UL (ref 3.9–12.7)

## 2022-02-07 PROCEDURE — 83970 ASSAY OF PARATHORMONE: CPT | Performed by: INTERNAL MEDICINE

## 2022-02-07 PROCEDURE — 36415 COLL VENOUS BLD VENIPUNCTURE: CPT | Mod: PO | Performed by: INTERNAL MEDICINE

## 2022-02-07 PROCEDURE — 80069 RENAL FUNCTION PANEL: CPT | Performed by: INTERNAL MEDICINE

## 2022-02-07 PROCEDURE — 84075 ASSAY ALKALINE PHOSPHATASE: CPT | Performed by: INTERNAL MEDICINE

## 2022-02-07 PROCEDURE — 85025 COMPLETE CBC W/AUTO DIFF WBC: CPT | Performed by: INTERNAL MEDICINE

## 2022-02-07 NOTE — TELEPHONE ENCOUNTER
Advised Sybil to call patient's Nephrologist and dialysis unit.  She verbalized understanding.    ----- Message from Ramakrishna Wells sent at 2/7/2022  9:50 AM CST -----  Pt daughter calling to speak w/ Sybil. Stating pt missed an entire week of dialysis and is inquiring about what to do next.      [phone]

## 2022-02-07 NOTE — TELEPHONE ENCOUNTER
----- Message from Anh Lopez sent at 2/7/2022 10:23 AM CST -----   .Type:  Needs Medical Advice    Who Called:  Sybil (daughter) [phone]  Symptoms (please be specific):   How long has patient had these symptoms:    Pharmacy name and phone #:    Would the patient rather a call back or a response via [facility]?  Best Call Back Number:    Additional Information: Pt is requesting a call from office regarding him missing a full week of dialysis and cant return to center until seen by . Please call.

## 2022-02-07 NOTE — TELEPHONE ENCOUNTER
Called daughter Sybil to get updates. Patient has been ill for the past week and unable to get dialysis.(no Covid related) Since he has missed, [facility] is asking for more labs before he can come in. Please advise asap.

## 2022-02-08 LAB
ALBUMIN SERPL BCP-MCNC: 3.6 G/DL (ref 3.5–5.2)
ALBUMIN SERPL BCP-MCNC: 3.6 G/DL (ref 3.5–5.2)
ALP SERPL-CCNC: 76 U/L (ref 55–135)
ALT SERPL W/O P-5'-P-CCNC: 17 U/L (ref 10–44)
ANION GAP SERPL CALC-SCNC: 22 MMOL/L (ref 8–16)
AST SERPL-CCNC: 24 U/L (ref 10–40)
BILIRUB DIRECT SERPL-MCNC: 0.2 MG/DL (ref 0.1–0.3)
BILIRUB SERPL-MCNC: 0.6 MG/DL (ref 0.1–1)
BUN SERPL-MCNC: 120 MG/DL (ref 6–20)
CALCIUM SERPL-MCNC: 9.4 MG/DL (ref 8.7–10.5)
CHLORIDE SERPL-SCNC: 96 MMOL/L (ref 95–110)
CO2 SERPL-SCNC: 16 MMOL/L (ref 23–29)
CREAT SERPL-MCNC: 20.8 MG/DL (ref 0.5–1.4)
EST. GFR  (AFRICAN AMERICAN): 2.4 ML/MIN/1.73 M^2
EST. GFR  (NON AFRICAN AMERICAN): 2.1 ML/MIN/1.73 M^2
GLUCOSE SERPL-MCNC: 106 MG/DL (ref 70–110)
PHOSPHATE SERPL-MCNC: 9.2 MG/DL (ref 2.7–4.5)
POTASSIUM SERPL-SCNC: 5.3 MMOL/L (ref 3.5–5.1)
PROT SERPL-MCNC: 7.7 G/DL (ref 6–8.4)
PTH-INTACT SERPL-MCNC: 706.5 PG/ML (ref 9–77)
SODIUM SERPL-SCNC: 134 MMOL/L (ref 136–145)

## 2022-02-09 ENCOUNTER — PATIENT MESSAGE (OUTPATIENT)
Dept: TRANSPLANT | Facility: CLINIC | Age: 62
End: 2022-02-09
Payer: MEDICARE

## 2022-02-09 NOTE — OP NOTE
VASCULAR SURGERY OP NOTE    Date of Operation/Procedure:  01/10/2022    Pre-operative Diagnosis: stenosis AV fistula left arm      Post-operative Diagnosis: same    Anesthesia: local    Operation/Procedure Performed:   1. Left arm fstulogram  2. PTA stenosis of AV fistula (5x20mm Ulraverse, 6x40mm ultraverse)    Attending Surgeon: CARLI Thomason II, MD    Resident/Fellow: Matthias Ruiz MD PGY2    Procedure in Detail:   After informed consent was obtained the patient was taken to the operating room in supine position. Appropriate hemodynamic monitors were put in place by the nurse. The left arm was prepped and draped in normal sterile fashion. A time-out was performed to confirm the appropriate patient, procedure, and laterality.  Using ultrasound guidance we obtained percutaneous antegrade access the left arm AV fistula using a micropuncture access kit. The micropuncture sheath was advanced over the wire and a fistulogram was performed through the sheath. Fistologram showed stenosis. No heparin was administered. A glide wire was then advanced through the sheath and across the stenosis. The micropuncture sheath was exchanged for a 5-6F slender sheath. A 5x20mm ultraverse was then advanced over the wire across the area of stenosis. The balloon was inflated for 2 minutes.  A repeat fistulogram showed improved flow through the fistula with minimal residual stenosis.  There was an area of lucency adjacent to the area of stenosis which we felt deserved additional treatment.  The 5 mm balloon was removed over the wire and a 6 x 40 mm balloon was advanced across the area of stenosis.  This was at flared it for 2 minutes.  While the balloon was inflated a fistulogram was performed through the sheath to image the inflow of the fistula.  The radial artery as well as the inflow portion of the fistula was widely patent without any evidence of stenosis.  The balloon was then deflated.  A completion fistulogram showed no  residual stenosis with brisk flow through the fistula.  The remainder of the fistula through the upper arm and central circulation was visualized and there was no evidence of central venous stenosis.  The sheath was then removed and the entry point dressed with a TR band. The patient tolerated the procedure well and was transported to the post-op area for recovery.    Estimated Blood loss: 10ml      Complications: none    CARLI Thomason II, MD, RPVI  Vascular Surgeon  [facility]

## 2022-02-10 ENCOUNTER — TELEPHONE (OUTPATIENT)
Dept: TRANSPLANT | Facility: CLINIC | Age: 62
End: 2022-02-10
Payer: MEDICARE

## 2022-02-10 NOTE — TELEPHONE ENCOUNTER
Chart reviewed to determine if patient went to the ER at [place] last night as agreed upon.  There is nothing documented.  Contacted patient to ask about his symptoms and why he did not go to the ER.  Pt states he does not want to go to the hospital and is laying down and feels a little bit better today.  Advised patient that if symptoms worsen, our recommendation is that he goes to the ER for evaluation.  Pt verbalized understanding.

## 2022-02-11 ENCOUNTER — TELEPHONE (OUTPATIENT)
Dept: TRANSPLANT | Facility: CLINIC | Age: 62
End: 2022-02-11
Payer: MEDICARE

## 2022-02-11 NOTE — PLAN OF CARE
Outside Transfer Acceptance Note / Regional Referral Center    Referring facility: 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  He is on home oxygen and using it more frequently.  No fever or chills reported.  COVID positive in the emergency department.  EKG showed sinus tachycardia with no definite acute changes.  Chest x-ray showed enlarged heart with mid and lower lung interstitial disease, overall improved from previous exam.  Sternotomy changes.  Upper lungs appear clear.  Case discussed with the referring ED provider and with Transplant Pulmonology at [facility].  He will be admitted to [facility] while awaiting a bed at [facility].  Once beds are available at [facility] will plan transfer to Hospital Medicine in COVID step-down status.    In the emergency department he is receiving Rocephin, azithromycin, dexamethasone, and weight based Lovenox.    COVID positive (February 11), INR 1.13, D-dimer 4226, BNP 97.1, troponin less than 0.03, CPK 93, sodium 130, potassium 5.4 (hemolyzed)-repeat potassium 4.8, chloride 91, CO2 18, ,  creatinine 19.07, glucose 125, AST 38, ALT 19, white blood cells 9.5, hemoglobin 11.3, hematocrit 33.7, platelets 284, lactic acid 0.9  ABG with pH 7.33/pCO2 34/PO2 80 (2 L nasal cannula)    Objective     Vitals:   Temperature 98.3°, pulse 120, blood pressure 160/90, oxygen saturation 99%  Recent Labs:  See abvoe  Recent imaging: see above      IV access:        Peripheral IV - Single Lumen 01/10/22 1007 20 G Right Forearm (Active)   Site Assessment Clean;Dry;Intact 01/10/22 1400   Extremity Assessment Distal to IV No abnormal discoloration;No redness;No swelling;No warmth 01/10/22 1400   Line Status Saline locked 01/10/22 1400   Dressing Status Clean;Dry;Intact 01/10/22 1400       Allergies: Review of patient's allergies indicates:  No Known Allergies   NPO: No      Anticoagulation:   Anticoagulants     Renal dose Lovenox           Instructions    1. Admit to Hospital Medicine COVID isolation  2. COVID protocols  3. Notify Transplant Pulmonology on arrival    Upon patient arrival to floor, please contact Hospital Medicine on call.     VIDHYA Martinez MD  Hospital Medicine Staff  Cell: [phone]

## 2022-02-11 NOTE — TELEPHONE ENCOUNTER
Pt's daughter states that patient is in the ER at [place] now.  She asked if patient should just come here to [facility].  Explained that he was instructed to go to the nearest ER to be assessed, treated, and stabilized, and to have the treating provider call to speak to our on call provider to discuss treatment plan and whether or not inpatient transfer is appropriate.  She verbalized understanding.

## 2022-02-12 ENCOUNTER — HOSPITAL ENCOUNTER (INPATIENT)
Facility: HOSPITAL | Age: 62
LOS: 5 days | Discharge: HOME HEALTH | DRG: 177 | End: 2022-02-17
Attending: INTERNAL MEDICINE | Admitting: INTERNAL MEDICINE
Payer: MEDICARE

## 2022-02-12 DIAGNOSIS — J96.01 ACUTE HYPOXEMIC RESPIRATORY FAILURE: Primary | ICD-10-CM

## 2022-02-12 DIAGNOSIS — U07.1 COVID-19: ICD-10-CM

## 2022-02-12 DIAGNOSIS — B25.9 CYTOMEGALOVIRUS INFECTION, UNSPECIFIED CYTOMEGALOVIRAL INFECTION TYPE: ICD-10-CM

## 2022-02-12 DIAGNOSIS — Z94.2 LUNG REPLACED BY TRANSPLANT: ICD-10-CM

## 2022-02-12 DIAGNOSIS — R07.9 CHEST PAIN: ICD-10-CM

## 2022-02-12 DIAGNOSIS — E11.65 TYPE 2 DIABETES MELLITUS WITH HYPERGLYCEMIA, WITH LONG-TERM CURRENT USE OF INSULIN: ICD-10-CM

## 2022-02-12 DIAGNOSIS — I27.20 PULMONARY HYPERTENSION: ICD-10-CM

## 2022-02-12 DIAGNOSIS — U07.1 COVID: ICD-10-CM

## 2022-02-12 DIAGNOSIS — Z79.4 TYPE 2 DIABETES MELLITUS WITH HYPERGLYCEMIA, WITH LONG-TERM CURRENT USE OF INSULIN: ICD-10-CM

## 2022-02-12 DIAGNOSIS — R00.0 TACHYCARDIA: ICD-10-CM

## 2022-02-12 PROCEDURE — 12000002 HC ACUTE/MED SURGE SEMI-PRIVATE ROOM

## 2022-02-13 PROBLEM — U07.1 COVID-19: Status: ACTIVE | Noted: 2022-02-13

## 2022-02-13 LAB
ALBUMIN SERPL BCP-MCNC: 2.8 G/DL (ref 3.5–5.2)
ALLENS TEST: ABNORMAL
ALP SERPL-CCNC: 56 U/L (ref 55–135)
ALT SERPL W/O P-5'-P-CCNC: 15 U/L (ref 10–44)
ANION GAP SERPL CALC-SCNC: 19 MMOL/L (ref 8–16)
ANION GAP SERPL CALC-SCNC: 21 MMOL/L (ref 8–16)
APTT BLDCRRT: 55.2 SEC (ref 21–32)
AST SERPL-CCNC: 22 U/L (ref 10–40)
B-OH-BUTYR BLD STRIP-SCNC: 0.1 MMOL/L (ref 0–0.5)
BACTERIA #/AREA URNS AUTO: NORMAL /HPF
BASOPHILS # BLD AUTO: 0.01 K/UL (ref 0–0.2)
BASOPHILS # BLD AUTO: 0.02 K/UL (ref 0–0.2)
BASOPHILS NFR BLD: 0.1 % (ref 0–1.9)
BASOPHILS NFR BLD: 0.3 % (ref 0–1.9)
BILIRUB SERPL-MCNC: 0.4 MG/DL (ref 0.1–1)
BILIRUB UR QL STRIP: NEGATIVE
BNP SERPL-MCNC: 300 PG/ML (ref 0–99)
BUN SERPL-MCNC: 72 MG/DL (ref 6–20)
BUN SERPL-MCNC: 90 MG/DL (ref 6–20)
CALCIUM SERPL-MCNC: 9.1 MG/DL (ref 8.7–10.5)
CALCIUM SERPL-MCNC: 9.2 MG/DL (ref 8.7–10.5)
CHLORIDE SERPL-SCNC: 93 MMOL/L (ref 95–110)
CHLORIDE SERPL-SCNC: 95 MMOL/L (ref 95–110)
CK SERPL-CCNC: 47 U/L (ref 20–200)
CLARITY UR REFRACT.AUTO: ABNORMAL
CO2 SERPL-SCNC: 19 MMOL/L (ref 23–29)
CO2 SERPL-SCNC: 21 MMOL/L (ref 23–29)
COLOR UR AUTO: YELLOW
CREAT SERPL-MCNC: 10.6 MG/DL (ref 0.5–1.4)
CREAT SERPL-MCNC: 12 MG/DL (ref 0.5–1.4)
D DIMER PPP IA.FEU-MCNC: 2.22 MG/L FEU
DELSYS: ABNORMAL
DIFFERENTIAL METHOD: ABNORMAL
DIFFERENTIAL METHOD: ABNORMAL
EOSINOPHIL # BLD AUTO: 0 K/UL (ref 0–0.5)
EOSINOPHIL # BLD AUTO: 0 K/UL (ref 0–0.5)
EOSINOPHIL NFR BLD: 0 % (ref 0–8)
EOSINOPHIL NFR BLD: 0 % (ref 0–8)
ERYTHROCYTE [DISTWIDTH] IN BLOOD BY AUTOMATED COUNT: 13.1 % (ref 11.5–14.5)
ERYTHROCYTE [DISTWIDTH] IN BLOOD BY AUTOMATED COUNT: 13.3 % (ref 11.5–14.5)
ERYTHROCYTE [SEDIMENTATION RATE] IN BLOOD BY WESTERGREN METHOD: 93 MM/HR (ref 0–23)
EST. GFR  (AFRICAN AMERICAN): 4.7 ML/MIN/1.73 M^2
EST. GFR  (AFRICAN AMERICAN): 5.5 ML/MIN/1.73 M^2
EST. GFR  (NON AFRICAN AMERICAN): 4.1 ML/MIN/1.73 M^2
EST. GFR  (NON AFRICAN AMERICAN): 4.7 ML/MIN/1.73 M^2
FACT X PPP CHRO-ACNC: 0.12 IU/ML (ref 0.3–0.7)
FACT X PPP CHRO-ACNC: 0.34 IU/ML (ref 0.3–0.7)
FACT X PPP CHRO-ACNC: 0.36 IU/ML (ref 0.3–0.7)
FERRITIN SERPL-MCNC: 5228 NG/ML (ref 20–300)
FIO2: 28
GLUCOSE SERPL-MCNC: 162 MG/DL (ref 70–110)
GLUCOSE SERPL-MCNC: 371 MG/DL (ref 70–110)
GLUCOSE UR QL STRIP: ABNORMAL
HCO3 UR-SCNC: 24 MMOL/L (ref 24–28)
HCT VFR BLD AUTO: 31.5 % (ref 40–54)
HCT VFR BLD AUTO: 32.7 % (ref 40–54)
HGB BLD-MCNC: 10.1 G/DL (ref 14–18)
HGB BLD-MCNC: 9.9 G/DL (ref 14–18)
HGB UR QL STRIP: ABNORMAL
HYALINE CASTS UR QL AUTO: 0 /LPF
IMM GRANULOCYTES # BLD AUTO: 0.11 K/UL (ref 0–0.04)
IMM GRANULOCYTES # BLD AUTO: 0.11 K/UL (ref 0–0.04)
IMM GRANULOCYTES NFR BLD AUTO: 1.5 % (ref 0–0.5)
IMM GRANULOCYTES NFR BLD AUTO: 1.7 % (ref 0–0.5)
INR PPP: 1 (ref 0.8–1.2)
INR PPP: 1 (ref 0.8–1.2)
KETONES UR QL STRIP: NEGATIVE
LACTATE SERPL-SCNC: 1 MMOL/L (ref 0.5–2.2)
LDH SERPL L TO P-CCNC: 293 U/L (ref 110–260)
LEUKOCYTE ESTERASE UR QL STRIP: NEGATIVE
LYMPHOCYTES # BLD AUTO: 1.1 K/UL (ref 1–4.8)
LYMPHOCYTES # BLD AUTO: 1.2 K/UL (ref 1–4.8)
LYMPHOCYTES NFR BLD: 15.8 % (ref 18–48)
LYMPHOCYTES NFR BLD: 16.8 % (ref 18–48)
MAGNESIUM SERPL-MCNC: 2.1 MG/DL (ref 1.6–2.6)
MCH RBC QN AUTO: 29.3 PG (ref 27–31)
MCH RBC QN AUTO: 29.6 PG (ref 27–31)
MCHC RBC AUTO-ENTMCNC: 30.3 G/DL (ref 32–36)
MCHC RBC AUTO-ENTMCNC: 32.1 G/DL (ref 32–36)
MCV RBC AUTO: 91 FL (ref 82–98)
MCV RBC AUTO: 98 FL (ref 82–98)
MICROSCOPIC COMMENT: NORMAL
MODE: ABNORMAL
MONOCYTES # BLD AUTO: 0.6 K/UL (ref 0.3–1)
MONOCYTES # BLD AUTO: 0.7 K/UL (ref 0.3–1)
MONOCYTES NFR BLD: 9.7 % (ref 4–15)
MONOCYTES NFR BLD: 9.7 % (ref 4–15)
NEUTROPHILS # BLD AUTO: 4.5 K/UL (ref 1.8–7.7)
NEUTROPHILS # BLD AUTO: 5.5 K/UL (ref 1.8–7.7)
NEUTROPHILS NFR BLD: 71.5 % (ref 38–73)
NEUTROPHILS NFR BLD: 72.9 % (ref 38–73)
NITRITE UR QL STRIP: NEGATIVE
NRBC BLD-RTO: 0 /100 WBC
NRBC BLD-RTO: 0 /100 WBC
PCO2 BLDA: 49.8 MMHG (ref 35–45)
PH SMN: 7.29 [PH] (ref 7.35–7.45)
PH UR STRIP: 5 [PH] (ref 5–8)
PHOSPHATE SERPL-MCNC: 7.9 MG/DL (ref 2.7–4.5)
PLATELET # BLD AUTO: 295 K/UL (ref 150–450)
PLATELET # BLD AUTO: 324 K/UL (ref 150–450)
PMV BLD AUTO: 9.6 FL (ref 9.2–12.9)
PMV BLD AUTO: 9.8 FL (ref 9.2–12.9)
PO2 BLDA: 39 MMHG (ref 40–60)
POC BE: -3 MMOL/L
POC SATURATED O2: 67 % (ref 95–100)
POC TCO2: 25 MMOL/L (ref 24–29)
POCT GLUCOSE: 154 MG/DL (ref 70–110)
POCT GLUCOSE: 168 MG/DL (ref 70–110)
POCT GLUCOSE: 172 MG/DL (ref 70–110)
POCT GLUCOSE: 186 MG/DL (ref 70–110)
POCT GLUCOSE: 232 MG/DL (ref 70–110)
POCT GLUCOSE: 238 MG/DL (ref 70–110)
POCT GLUCOSE: 311 MG/DL (ref 70–110)
POCT GLUCOSE: 345 MG/DL (ref 70–110)
POCT GLUCOSE: 393 MG/DL (ref 70–110)
POCT GLUCOSE: 412 MG/DL (ref 70–110)
POCT GLUCOSE: 417 MG/DL (ref 70–110)
POCT GLUCOSE: 419 MG/DL (ref 70–110)
POCT GLUCOSE: 444 MG/DL (ref 70–110)
POTASSIUM SERPL-SCNC: 4.4 MMOL/L (ref 3.5–5.1)
POTASSIUM SERPL-SCNC: 4.7 MMOL/L (ref 3.5–5.1)
PROCALCITONIN SERPL IA-MCNC: 1.38 NG/ML
PROT SERPL-MCNC: 6.6 G/DL (ref 6–8.4)
PROT UR QL STRIP: ABNORMAL
PROTHROMBIN TIME: 10.7 SEC (ref 9–12.5)
PROTHROMBIN TIME: 10.7 SEC (ref 9–12.5)
RBC # BLD AUTO: 3.34 M/UL (ref 4.6–6.2)
RBC # BLD AUTO: 3.45 M/UL (ref 4.6–6.2)
RBC #/AREA URNS AUTO: 2 /HPF (ref 0–4)
SAMPLE: ABNORMAL
SITE: ABNORMAL
SODIUM SERPL-SCNC: 133 MMOL/L (ref 136–145)
SODIUM SERPL-SCNC: 135 MMOL/L (ref 136–145)
SP GR UR STRIP: 1.01 (ref 1–1.03)
SQUAMOUS #/AREA URNS AUTO: 8 /HPF
TACROLIMUS BLD-MCNC: 27.6 NG/ML (ref 5–15)
TROPONIN I SERPL DL<=0.01 NG/ML-MCNC: 0.03 NG/ML (ref 0–0.03)
URN SPEC COLLECT METH UR: ABNORMAL
WBC # BLD AUTO: 6.36 K/UL (ref 3.9–12.7)
WBC # BLD AUTO: 7.55 K/UL (ref 3.9–12.7)
WBC #/AREA URNS AUTO: 3 /HPF (ref 0–5)
YEAST UR QL AUTO: NORMAL

## 2022-02-13 PROCEDURE — 94640 AIRWAY INHALATION TREATMENT: CPT

## 2022-02-13 PROCEDURE — 25000003 PHARM REV CODE 250: Performed by: STUDENT IN AN ORGANIZED HEALTH CARE EDUCATION/TRAINING PROGRAM

## 2022-02-13 PROCEDURE — 99222 PR INITIAL HOSPITAL CARE,LEVL II: ICD-10-PCS | Mod: ,,, | Performed by: INTERNAL MEDICINE

## 2022-02-13 PROCEDURE — 83880 ASSAY OF NATRIURETIC PEPTIDE: CPT | Performed by: STUDENT IN AN ORGANIZED HEALTH CARE EDUCATION/TRAINING PROGRAM

## 2022-02-13 PROCEDURE — 84484 ASSAY OF TROPONIN QUANT: CPT | Performed by: STUDENT IN AN ORGANIZED HEALTH CARE EDUCATION/TRAINING PROGRAM

## 2022-02-13 PROCEDURE — 36415 COLL VENOUS BLD VENIPUNCTURE: CPT | Performed by: STUDENT IN AN ORGANIZED HEALTH CARE EDUCATION/TRAINING PROGRAM

## 2022-02-13 PROCEDURE — 99223 1ST HOSP IP/OBS HIGH 75: CPT | Mod: GC,,, | Performed by: INTERNAL MEDICINE

## 2022-02-13 PROCEDURE — 83605 ASSAY OF LACTIC ACID: CPT | Performed by: STUDENT IN AN ORGANIZED HEALTH CARE EDUCATION/TRAINING PROGRAM

## 2022-02-13 PROCEDURE — 99233 PR SUBSEQUENT HOSPITAL CARE,LEVL III: ICD-10-PCS | Mod: CR,,, | Performed by: INTERNAL MEDICINE

## 2022-02-13 PROCEDURE — 80169 DRUG ASSAY EVEROLIMUS: CPT | Performed by: STUDENT IN AN ORGANIZED HEALTH CARE EDUCATION/TRAINING PROGRAM

## 2022-02-13 PROCEDURE — 27000221 HC OXYGEN, UP TO 24 HOURS

## 2022-02-13 PROCEDURE — 85379 FIBRIN DEGRADATION QUANT: CPT | Performed by: STUDENT IN AN ORGANIZED HEALTH CARE EDUCATION/TRAINING PROGRAM

## 2022-02-13 PROCEDURE — 85652 RBC SED RATE AUTOMATED: CPT | Performed by: STUDENT IN AN ORGANIZED HEALTH CARE EDUCATION/TRAINING PROGRAM

## 2022-02-13 PROCEDURE — 87040 BLOOD CULTURE FOR BACTERIA: CPT | Performed by: STUDENT IN AN ORGANIZED HEALTH CARE EDUCATION/TRAINING PROGRAM

## 2022-02-13 PROCEDURE — 85730 THROMBOPLASTIN TIME PARTIAL: CPT | Performed by: STUDENT IN AN ORGANIZED HEALTH CARE EDUCATION/TRAINING PROGRAM

## 2022-02-13 PROCEDURE — 63600175 PHARM REV CODE 636 W HCPCS: Performed by: STUDENT IN AN ORGANIZED HEALTH CARE EDUCATION/TRAINING PROGRAM

## 2022-02-13 PROCEDURE — 99233 SBSQ HOSP IP/OBS HIGH 50: CPT | Mod: CR,,, | Performed by: INTERNAL MEDICINE

## 2022-02-13 PROCEDURE — 80053 COMPREHEN METABOLIC PANEL: CPT | Performed by: STUDENT IN AN ORGANIZED HEALTH CARE EDUCATION/TRAINING PROGRAM

## 2022-02-13 PROCEDURE — 83735 ASSAY OF MAGNESIUM: CPT | Performed by: STUDENT IN AN ORGANIZED HEALTH CARE EDUCATION/TRAINING PROGRAM

## 2022-02-13 PROCEDURE — 99223 1ST HOSP IP/OBS HIGH 75: CPT | Mod: GC,,, | Performed by: STUDENT IN AN ORGANIZED HEALTH CARE EDUCATION/TRAINING PROGRAM

## 2022-02-13 PROCEDURE — 80048 BASIC METABOLIC PNL TOTAL CA: CPT | Performed by: STUDENT IN AN ORGANIZED HEALTH CARE EDUCATION/TRAINING PROGRAM

## 2022-02-13 PROCEDURE — 82010 KETONE BODYS QUAN: CPT | Performed by: STUDENT IN AN ORGANIZED HEALTH CARE EDUCATION/TRAINING PROGRAM

## 2022-02-13 PROCEDURE — 25000242 PHARM REV CODE 250 ALT 637 W/ HCPCS: Performed by: STUDENT IN AN ORGANIZED HEALTH CARE EDUCATION/TRAINING PROGRAM

## 2022-02-13 PROCEDURE — 80197 ASSAY OF TACROLIMUS: CPT | Performed by: STUDENT IN AN ORGANIZED HEALTH CARE EDUCATION/TRAINING PROGRAM

## 2022-02-13 PROCEDURE — 27000207 HC ISOLATION

## 2022-02-13 PROCEDURE — 84145 PROCALCITONIN (PCT): CPT | Performed by: STUDENT IN AN ORGANIZED HEALTH CARE EDUCATION/TRAINING PROGRAM

## 2022-02-13 PROCEDURE — 99222 1ST HOSP IP/OBS MODERATE 55: CPT | Mod: ,,, | Performed by: INTERNAL MEDICINE

## 2022-02-13 PROCEDURE — 81001 URINALYSIS AUTO W/SCOPE: CPT | Performed by: STUDENT IN AN ORGANIZED HEALTH CARE EDUCATION/TRAINING PROGRAM

## 2022-02-13 PROCEDURE — 85610 PROTHROMBIN TIME: CPT | Performed by: STUDENT IN AN ORGANIZED HEALTH CARE EDUCATION/TRAINING PROGRAM

## 2022-02-13 PROCEDURE — 99223 PR INITIAL HOSPITAL CARE,LEVL III: ICD-10-PCS | Mod: GC,,, | Performed by: STUDENT IN AN ORGANIZED HEALTH CARE EDUCATION/TRAINING PROGRAM

## 2022-02-13 PROCEDURE — 84100 ASSAY OF PHOSPHORUS: CPT | Performed by: STUDENT IN AN ORGANIZED HEALTH CARE EDUCATION/TRAINING PROGRAM

## 2022-02-13 PROCEDURE — 99223 PR INITIAL HOSPITAL CARE,LEVL III: ICD-10-PCS | Mod: GC,,, | Performed by: INTERNAL MEDICINE

## 2022-02-13 PROCEDURE — 12000002 HC ACUTE/MED SURGE SEMI-PRIVATE ROOM

## 2022-02-13 PROCEDURE — 99900035 HC TECH TIME PER 15 MIN (STAT)

## 2022-02-13 PROCEDURE — 85025 COMPLETE CBC W/AUTO DIFF WBC: CPT | Performed by: STUDENT IN AN ORGANIZED HEALTH CARE EDUCATION/TRAINING PROGRAM

## 2022-02-13 PROCEDURE — 83615 LACTATE (LD) (LDH) ENZYME: CPT | Performed by: STUDENT IN AN ORGANIZED HEALTH CARE EDUCATION/TRAINING PROGRAM

## 2022-02-13 PROCEDURE — 63700000 PHARM REV CODE 250 ALT 637 W/O HCPCS: Performed by: STUDENT IN AN ORGANIZED HEALTH CARE EDUCATION/TRAINING PROGRAM

## 2022-02-13 PROCEDURE — 82550 ASSAY OF CK (CPK): CPT | Performed by: STUDENT IN AN ORGANIZED HEALTH CARE EDUCATION/TRAINING PROGRAM

## 2022-02-13 PROCEDURE — 85520 HEPARIN ASSAY: CPT | Performed by: STUDENT IN AN ORGANIZED HEALTH CARE EDUCATION/TRAINING PROGRAM

## 2022-02-13 PROCEDURE — 85520 HEPARIN ASSAY: CPT | Mod: 91 | Performed by: STUDENT IN AN ORGANIZED HEALTH CARE EDUCATION/TRAINING PROGRAM

## 2022-02-13 PROCEDURE — 82728 ASSAY OF FERRITIN: CPT | Performed by: STUDENT IN AN ORGANIZED HEALTH CARE EDUCATION/TRAINING PROGRAM

## 2022-02-13 PROCEDURE — 94761 N-INVAS EAR/PLS OXIMETRY MLT: CPT

## 2022-02-13 RX ORDER — IBUPROFEN 200 MG
24 TABLET ORAL
Status: DISCONTINUED | OUTPATIENT
Start: 2022-02-13 | End: 2022-02-13

## 2022-02-13 RX ORDER — SODIUM CHLORIDE 0.9 % (FLUSH) 0.9 %
10 SYRINGE (ML) INJECTION EVERY 12 HOURS PRN
Status: DISCONTINUED | OUTPATIENT
Start: 2022-02-13 | End: 2022-02-17 | Stop reason: HOSPADM

## 2022-02-13 RX ORDER — CARVEDILOL 3.12 MG/1
3.12 TABLET ORAL 2 TIMES DAILY
Status: DISCONTINUED | OUTPATIENT
Start: 2022-02-13 | End: 2022-02-17 | Stop reason: HOSPADM

## 2022-02-13 RX ORDER — SODIUM BICARBONATE 650 MG/1
650 TABLET ORAL 2 TIMES DAILY
Status: DISCONTINUED | OUTPATIENT
Start: 2022-02-13 | End: 2022-02-17 | Stop reason: HOSPADM

## 2022-02-13 RX ORDER — VALSARTAN 160 MG/1
160 TABLET ORAL 2 TIMES DAILY
Status: DISCONTINUED | OUTPATIENT
Start: 2022-02-13 | End: 2022-02-17 | Stop reason: HOSPADM

## 2022-02-13 RX ORDER — INSULIN ASPART 100 [IU]/ML
5 INJECTION, SOLUTION INTRAVENOUS; SUBCUTANEOUS
Status: DISCONTINUED | OUTPATIENT
Start: 2022-02-13 | End: 2022-02-13

## 2022-02-13 RX ORDER — NALOXONE HCL 0.4 MG/ML
0.02 VIAL (ML) INJECTION
Status: DISCONTINUED | OUTPATIENT
Start: 2022-02-13 | End: 2022-02-17 | Stop reason: HOSPADM

## 2022-02-13 RX ORDER — CLONIDINE HYDROCHLORIDE 0.1 MG/1
0.1 TABLET ORAL 3 TIMES DAILY
Status: DISCONTINUED | OUTPATIENT
Start: 2022-02-13 | End: 2022-02-17 | Stop reason: HOSPADM

## 2022-02-13 RX ORDER — AMLODIPINE BESYLATE 10 MG/1
10 TABLET ORAL DAILY
Status: DISCONTINUED | OUTPATIENT
Start: 2022-02-13 | End: 2022-02-17 | Stop reason: HOSPADM

## 2022-02-13 RX ORDER — PANTOPRAZOLE SODIUM 40 MG/1
40 TABLET, DELAYED RELEASE ORAL DAILY
Status: DISCONTINUED | OUTPATIENT
Start: 2022-02-13 | End: 2022-02-17 | Stop reason: HOSPADM

## 2022-02-13 RX ORDER — IBUPROFEN 200 MG
16 TABLET ORAL
Status: DISCONTINUED | OUTPATIENT
Start: 2022-02-13 | End: 2022-02-13

## 2022-02-13 RX ORDER — INSULIN ASPART 100 [IU]/ML
0-5 INJECTION, SOLUTION INTRAVENOUS; SUBCUTANEOUS
Status: DISCONTINUED | OUTPATIENT
Start: 2022-02-13 | End: 2022-02-14

## 2022-02-13 RX ORDER — HEPARIN SODIUM,PORCINE/D5W 25000/250
0-40 INTRAVENOUS SOLUTION INTRAVENOUS CONTINUOUS
Status: DISCONTINUED | OUTPATIENT
Start: 2022-02-13 | End: 2022-02-15

## 2022-02-13 RX ORDER — FOLIC ACID 1 MG/1
1000 TABLET ORAL DAILY
Status: DISCONTINUED | OUTPATIENT
Start: 2022-02-13 | End: 2022-02-17 | Stop reason: HOSPADM

## 2022-02-13 RX ORDER — TACROLIMUS 1 MG/1
3 CAPSULE ORAL 2 TIMES DAILY
Status: DISCONTINUED | OUTPATIENT
Start: 2022-02-13 | End: 2022-02-13

## 2022-02-13 RX ORDER — GLUCAGON 1 MG
1 KIT INJECTION
Status: DISCONTINUED | OUTPATIENT
Start: 2022-02-13 | End: 2022-02-13

## 2022-02-13 RX ORDER — ALBUTEROL SULFATE 90 UG/1
2 AEROSOL, METERED RESPIRATORY (INHALATION) EVERY 6 HOURS
Status: DISCONTINUED | OUTPATIENT
Start: 2022-02-13 | End: 2022-02-17 | Stop reason: HOSPADM

## 2022-02-13 RX ORDER — SEVELAMER CARBONATE 800 MG/1
800 TABLET, FILM COATED ORAL
Status: DISCONTINUED | OUTPATIENT
Start: 2022-02-13 | End: 2022-02-14

## 2022-02-13 RX ORDER — AZITHROMYCIN 250 MG/1
500 TABLET, FILM COATED ORAL
Status: DISCONTINUED | OUTPATIENT
Start: 2022-02-13 | End: 2022-02-13

## 2022-02-13 RX ORDER — MUPIROCIN 20 MG/G
OINTMENT TOPICAL 2 TIMES DAILY
Status: DISCONTINUED | OUTPATIENT
Start: 2022-02-13 | End: 2022-02-17 | Stop reason: HOSPADM

## 2022-02-13 RX ORDER — ASCORBIC ACID 500 MG
500 TABLET ORAL 2 TIMES DAILY
Status: DISCONTINUED | OUTPATIENT
Start: 2022-02-13 | End: 2022-02-17 | Stop reason: HOSPADM

## 2022-02-13 RX ORDER — ATORVASTATIN CALCIUM 20 MG/1
40 TABLET, FILM COATED ORAL DAILY
Status: DISCONTINUED | OUTPATIENT
Start: 2022-02-13 | End: 2022-02-17 | Stop reason: HOSPADM

## 2022-02-13 RX ORDER — EVEROLIMUS 0.75 MG/1
0.75 TABLET ORAL EVERY 12 HOURS
Status: DISCONTINUED | OUTPATIENT
Start: 2022-02-13 | End: 2022-02-17 | Stop reason: HOSPADM

## 2022-02-13 RX ORDER — SODIUM CHLORIDE 0.9 % (FLUSH) 0.9 %
10 SYRINGE (ML) INJECTION
Status: DISCONTINUED | OUTPATIENT
Start: 2022-02-13 | End: 2022-02-17 | Stop reason: HOSPADM

## 2022-02-13 RX ORDER — HEPARIN SODIUM 5000 [USP'U]/ML
5000 INJECTION, SOLUTION INTRAVENOUS; SUBCUTANEOUS EVERY 8 HOURS
Status: DISCONTINUED | OUTPATIENT
Start: 2022-02-13 | End: 2022-02-13

## 2022-02-13 RX ORDER — EVEROLIMUS 0.75 MG/1
0.75 TABLET ORAL EVERY 12 HOURS
Status: DISCONTINUED | OUTPATIENT
Start: 2022-02-13 | End: 2022-02-13

## 2022-02-13 RX ADMIN — SODIUM BICARBONATE 650 MG TABLET 650 MG: at 08:02

## 2022-02-13 RX ADMIN — INSULIN ASPART 5 UNITS: 100 INJECTION, SOLUTION INTRAVENOUS; SUBCUTANEOUS at 11:02

## 2022-02-13 RX ADMIN — ALBUTEROL SULFATE 2 PUFF: 108 INHALANT RESPIRATORY (INHALATION) at 01:02

## 2022-02-13 RX ADMIN — ALBUTEROL SULFATE 2 PUFF: 108 INHALANT RESPIRATORY (INHALATION) at 07:02

## 2022-02-13 RX ADMIN — TACROLIMUS 3 MG: 1 CAPSULE ORAL at 08:02

## 2022-02-13 RX ADMIN — CEFTRIAXONE 1 G: 1 INJECTION, SOLUTION INTRAVENOUS at 08:02

## 2022-02-13 RX ADMIN — SEVELAMER CARBONATE 800 MG: 800 TABLET, FILM COATED ORAL at 10:02

## 2022-02-13 RX ADMIN — MUPIROCIN: 20 OINTMENT TOPICAL at 08:02

## 2022-02-13 RX ADMIN — EVEROLIMUS 0.75 MG: 0.75 TABLET ORAL at 10:02

## 2022-02-13 RX ADMIN — OXYCODONE HYDROCHLORIDE AND ACETAMINOPHEN 500 MG: 500 TABLET ORAL at 09:02

## 2022-02-13 RX ADMIN — AZITHROMYCIN MONOHYDRATE 500 MG: 250 TABLET ORAL at 02:02

## 2022-02-13 RX ADMIN — ALBUTEROL SULFATE 2 PUFF: 108 INHALANT RESPIRATORY (INHALATION) at 08:02

## 2022-02-13 RX ADMIN — REMDESIVIR 200 MG: 100 INJECTION, POWDER, LYOPHILIZED, FOR SOLUTION INTRAVENOUS at 02:02

## 2022-02-13 RX ADMIN — OXYCODONE HYDROCHLORIDE AND ACETAMINOPHEN 500 MG: 500 TABLET ORAL at 08:02

## 2022-02-13 RX ADMIN — CLONIDINE HYDROCHLORIDE 0.1 MG: 0.1 TABLET ORAL at 08:02

## 2022-02-13 RX ADMIN — ALBUTEROL SULFATE 2 PUFF: 108 INHALANT RESPIRATORY (INHALATION) at 02:02

## 2022-02-13 RX ADMIN — SEVELAMER CARBONATE 800 MG: 800 TABLET, FILM COATED ORAL at 05:02

## 2022-02-13 RX ADMIN — CARVEDILOL 3.12 MG: 3.12 TABLET, FILM COATED ORAL at 09:02

## 2022-02-13 RX ADMIN — DEXAMETHASONE 6 MG: 4 TABLET ORAL at 08:02

## 2022-02-13 RX ADMIN — VALSARTAN 160 MG: 160 TABLET, FILM COATED ORAL at 09:02

## 2022-02-13 RX ADMIN — FOLIC ACID 1000 MCG: 1 TABLET ORAL at 08:02

## 2022-02-13 RX ADMIN — CLONIDINE HYDROCHLORIDE 0.1 MG: 0.1 TABLET ORAL at 02:02

## 2022-02-13 RX ADMIN — INSULIN HUMAN 3 UNITS/HR: 1 INJECTION, SOLUTION INTRAVENOUS at 02:02

## 2022-02-13 RX ADMIN — INSULIN ASPART 4 UNITS: 100 INJECTION, SOLUTION INTRAVENOUS; SUBCUTANEOUS at 08:02

## 2022-02-13 RX ADMIN — VALSARTAN 160 MG: 160 TABLET, FILM COATED ORAL at 08:02

## 2022-02-13 RX ADMIN — CARVEDILOL 3.12 MG: 3.12 TABLET, FILM COATED ORAL at 08:02

## 2022-02-13 RX ADMIN — AMLODIPINE BESYLATE 10 MG: 10 TABLET ORAL at 08:02

## 2022-02-13 RX ADMIN — CLONIDINE HYDROCHLORIDE 0.1 MG: 0.1 TABLET ORAL at 09:02

## 2022-02-13 RX ADMIN — PANTOPRAZOLE SODIUM 40 MG: 40 TABLET, DELAYED RELEASE ORAL at 08:02

## 2022-02-13 RX ADMIN — ATORVASTATIN CALCIUM 40 MG: 20 TABLET, FILM COATED ORAL at 08:02

## 2022-02-13 RX ADMIN — THERA TABS 1 TABLET: TAB at 08:02

## 2022-02-13 RX ADMIN — CEFTRIAXONE 1 G: 1 INJECTION, SOLUTION INTRAVENOUS at 01:02

## 2022-02-13 RX ADMIN — MUPIROCIN: 20 OINTMENT TOPICAL at 09:02

## 2022-02-13 RX ADMIN — HEPARIN SODIUM 12 UNITS/KG/HR: 5000 INJECTION INTRAVENOUS; SUBCUTANEOUS at 07:02

## 2022-02-13 RX ADMIN — SODIUM BICARBONATE 650 MG TABLET 650 MG: at 09:02

## 2022-02-13 RX ADMIN — EVEROLIMUS 0.75 MG: 0.75 TABLET ORAL at 05:02

## 2022-02-13 RX ADMIN — TACROLIMUS 3 MG: 1 CAPSULE ORAL at 05:02

## 2022-02-13 NOTE — ASSESSMENT & PLAN NOTE
Agree with current plan:  Remdesivir x 5 days.  Dexamethasone x 10 days.  Supplemental oxygen to maintain an O2 Sat>88%

## 2022-02-13 NOTE — ASSESSMENT & PLAN NOTE
-Last A1c reviewed-   Lab Results   Component Value Date    HGBA1C 8.1 (H) 12/13/2021       Home Antihyperglycemic Regiment:  -LDSSI, Aspart 10 u TID and deglu 20 u QHS    Inpatient Antihyperglycemic Regiment:  Antihyperglycemics (From admission, onward)            Start     Stop Route Frequency Ordered    02/13/22 0132  insulin aspart U-100 pen 0-5 Units         -- SubQ Before meals & nightly PRN 02/13/22 0034        - Start Detemir 5 units QHS for now and titrate as more data available   - SSI with POCT accuchecks ACHS and Diabetic diet 2000 byron  - Diabetic nutritional counseling given   - Goal 140-180 while IP

## 2022-02-13 NOTE — H&P
[facility] - Intensive Care ([address] Medicine  History & Physical    Patient Name: Martin Hendrix Jr.  MRN: [id number]  Patient Class: IP- Inpatient  Admission Date: 2/12/2022  Attending Physician: Mike Cordoba MD   Primary Care Provider: Michel Henley MD         Patient information was obtained from patient and ER records.     Subjective:     Principal Problem:COVID-19    Chief Complaint: No chief complaint on file.       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  Patient reported oxygen saturation of 88% on room air, he is on home oxygen as needed during exertion however was using it more often and at rest.  Reports requiring higher level of oxygen up to 5 L even at rest, which is unusual for him.  Today, the patient reports improvements in symptoms however he continues to be short of breath and quickly becomes tachypneic with worsening oxygen sat on ambulation.    At the outside hospital emergency room patient was COVID positive, chest x-ray with mid and lower lung interstitial disease (improved from previous).  Patient was requiring 4 L of oxygen satting 95%, was started on COVID protocol with Rocephin, azithromycin, dexamethasone and weight based Lovenox.  Due to patient's history of  lung transplant decision was made to transfer to [facility] for transplant pulmonology evaluation. He was admitted to [facility] while awaiting a bed at [facility].  His labs were as follow:  COVID positive (February 11), INR 1.13, D-dimer 4226, BNP 97.1, troponin less than 0.03, CPK 93, sodium 130, potassium 5.4 (hemolyzed)-repeat potassium 4.8, chloride 91, CO2 18, , creatinine 19.07, glucose 125, AST 38, ALT 19, white blood cells 9.5, hemoglobin 11.3, hematocrit 33.7,  platelets 284, lactic acid 0.9 ABG with pH 7.33/pCO2 34/PO2 80 (2 L nasal cannula) VS: Temperature 98.3°, pulse 120, blood pressure 160/90, oxygen saturation 99%.     On presentation to [facility] patient was on 2 L satting 95%, hemodynamically stable.  Admitted to Hospital Medicine for further care and management.          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 Peacock    DM (diabetes mellitus) 11/2017     02/22/2021 Insulin x 2017    Hypertension     On home oxygen therapy     KRISTYN on CPAP     Osteopenia     Pancreatitis 2009    hospitalized    Pulmonary hypertension     Pure hypercholesterolemia 11/15/2017    Sarcoidosis     Shortness of breath     Type 2 diabetes mellitus 11/5/2017       Past Surgical History:   Procedure Laterality Date    ABDOMINAL SURGERY      6 weeks old    AV FISTULA PLACEMENT Left 10/13/2021    Procedure: CREATION, AV FISTULA;  Surgeon: CARLI Thomason II, MD;  Location: [facility] OR 2ND FLR;  Service: Vascular;  Laterality: Left;    BRONCHOSCOPY      BRONCHOSCOPY N/A 8/22/2018    Procedure: flexible bronchoscopy with tissue biopsy CPT 41170;  Surgeon: [facility] Diagnostic Provider;  Location: [facility] OR 2ND FLR;  Service: Endoscopy;  Laterality: N/A;    BRONCHOSCOPY N/A 11/20/2018    Procedure: flexible bronchoscopy with tissue biopy CPT 96888;  Surgeon: [facility] Diagnostic Provider;  Location: [facility] OR 2ND FLR;  Service: Anesthesiology;  Laterality: N/A;    BRONCHOSCOPY N/A 11/10/2021    Procedure: Flexible bronchoscopy;  Surgeon: Samantha Bill DO;  Location: [facility] OR 2ND FLR;  Service: Endoscopy;  Laterality: N/A;    CHEST TUBE INSERTION      CHOLECYSTECTOMY      COLONOSCOPY      ESOPHAGOGASTRODUODENOSCOPY N/A 8/6/2018    Procedure: EGD (ESOPHAGOGASTRODUODENOSCOPY);  Surgeon: Ramu CORTEZ  MD [place];  Location: [facility] ENDO (2ND FLR);  Service: Endoscopy;  Laterality: N/A;  off pepcid and all acid reducers for 2 weeks; PA > 50    FISTULOGRAM Left 1/10/2022    Procedure: Fistulogram;  Surgeon: CARLI Thomason II, MD;  Location: [facility] CATH LAB;  Service: Vascular;  Laterality: Left;    INSERTION OF TUNNELED CENTRAL VENOUS HEMODIALYSIS CATHETER N/A 3/13/2019    Procedure: INSERTION, CATHETER, CENTRAL VENOUS, HEMODIALYSIS, TUNNELED;  Surgeon: Edy Gonzalez MD;  Location: [facility] CATH LAB;  Service: Nephrology;  Laterality: N/A;    INSERTION OF TUNNELED CENTRAL VENOUS HEMODIALYSIS CATHETER Right 5/7/2021    Procedure: Insertion, Catheter, Central Venous, Hemodialysis;  Surgeon: Mary Joshi MD;  Location: [facility] CATH LAB;  Service: Interventional Nephrology;  Laterality: Right;    LUNG BIOPSY      LUNG TRANSPLANT  08/2017    PERCUTANEOUS TRANSLUMINAL ANGIOPLASTY OF ARTERIOVENOUS FISTULA N/A 1/10/2022    Procedure: PTA, AV FISTULA;  Surgeon: CARLI Thomason II, MD;  Location: [facility] CATH LAB;  Service: Vascular;  Laterality: N/A;    REMOVAL OF TUNNELED CENTRAL VENOUS CATHETER (CVC) N/A 5/16/2019    Procedure: REMOVAL, CATHETER, CENTRAL VENOUS, TUNNELED;  Surgeon: Edy Gonzalez MD;  Location: [facility] CATH LAB;  Service: Nephrology;  Laterality: N/A;    REVISION OF ARTERIOVENOUS FISTULA Left 11/18/2021    Procedure: REVISION, AV FISTULA;  Surgeon: CARLI Thomason II, MD;  Location: [facility] OR 2ND FLR;  Service: Vascular;  Laterality: Left;  Ligation of side branches    TONSILLECTOMY         Review of patient's allergies indicates:  No Known Allergies    Current Facility-Administered Medications on File Prior to Encounter   Medication    0.9%  NaCl infusion    cefazolin (ANCEF) 2 gram in dextrose 5% 50 mL IVPB (premix)    [DISCONTINUED] acetaminophen tablet    [DISCONTINUED] albuterol-ipratropium 2.5 mg-0.5 mg/3 mL nebulizer solution    [DISCONTINUED] aluminum & magnesium hydroxide-simethicone  400-400-40 mg/5 mL suspension    [DISCONTINUED] amLODIPine tablet    [DISCONTINUED] aspirin EC tablet    [DISCONTINUED] benzonatate capsule    [DISCONTINUED] carvediloL tablet    [DISCONTINUED] cinacalcet tablet    [DISCONTINUED] cloNIDine tablet    [DISCONTINUED] epoetin greer injection    [DISCONTINUED] everolimus (immunosuppressive) tablet    [DISCONTINUED] famotidine tablet    [DISCONTINUED] fluticasone propionate 44 mcg/actuation inhaler    [DISCONTINUED] folic acid tablet    [DISCONTINUED] GENERIC EXTERNAL MEDICATION    [DISCONTINUED] heparin (porcine) injection    [DISCONTINUED] insulin lispro injection    [DISCONTINUED] insulin lispro injection    [DISCONTINUED] lactulose 10 gram/15 mL solution    [DISCONTINUED] loperamide capsule    [DISCONTINUED] magnesium chloride TBEC tablet TbEC    [DISCONTINUED] methotrexate tablet    [DISCONTINUED] ondansetron injection    [DISCONTINUED] pantoprazole EC tablet    [DISCONTINUED] promethazine suppository    [DISCONTINUED] rosuvastatin tablet    [DISCONTINUED] sevelamer carbonate tablet    [DISCONTINUED] sodium bicarbonate tablet    [DISCONTINUED] tacrolimus capsule    [DISCONTINUED] temazepam capsule    [DISCONTINUED] valsartan tablet     Current Outpatient Medications on File Prior to Encounter   Medication Sig    amLODIPine (NORVASC) 10 MG tablet TAKE 1 TABLET BY MOUTH EVERY DAY    azithromycin (Z-REYNA) 250 MG tablet Take 1 tablet (250 mg total) by mouth every Mon, Wed, Fri.    carvediloL (COREG) 3.125 MG tablet One po BID, hold if SBP < 130    cholecalciferol, vitamin D3, (VITAMIN D3) 25 mcg (1,000 unit) capsule Take 2 capsules (2,000 Units total) by mouth once daily.    cinacalcet (SENSIPAR) 30 MG Tab One po QDAY, with largest meal    cloNIDine (CATAPRES) 0.1 MG tablet Take 1 tablet (0.1 mg total) by mouth 3 (three) times daily.    ECOTRIN LOW STRENGTH 81 mg EC tablet TAKE 1 TABLET DAILY    epoetin greer-epbx (RETACRIT) 4,000 unit/mL  "injection Inject 1.41 mLs (5,640 Units total) into the skin every Tues, Thurs, Sat. Administered by dialysis unit on T/Th/Sat.    everolimus, immunosuppressive, (ZORTRESS) 0.75 mg tablet Take 1 tablet (0.75 mg total) by mouth every 12 (twelve) hours.    folic acid (FOLVITE) 1 MG tablet TAKE 1 TABLET DAILY    furosemide (LASIX) 40 MG tablet TAKE 1 TABLET BY MOUTH EVERY DAY AS NEEDED FOR LEG SWELLING    insulin aspart U-100 (NOVOLOG FLEXPEN U-100 INSULIN) 100 unit/mL (3 mL) InPn pen Inject 10 Units into the skin 3 (three) times daily with meals.    insulin aspart U-100 (NOVOLOG) 100 unit/mL (3 mL) InPn pen Inject 0-5 Units into the skin before meals and at bedtime as needed (Hyperglycemia). Use in addition to 10 units TID with meals    insulin degludec (TRESIBA FLEXTOUCH U-200) 200 unit/mL (3 mL) insulin pen Inject 20 Units into the skin every evening. (Patient taking differently: Inject 26 Units into the skin every evening.)    lancets Misc To check BG 4 times daily, to use with insurance preferred meter    magnesium chloride (MAG 64) 64 mg TbEC Take 2 tablets (128 mg total) by mouth 2 (two) times daily.    ondansetron (ZOFRAN-ODT) 8 MG TbDL Dissolve 1 tablet (8 mg total) by mouth daily as needed (pre-med for IVIG infusions).    ONETOUCH VERIO Strp USE TO CHECK BLOOD GLUCOSE FOUR TIMES A DAY, TO USE WITH INSURANCE PREFERRED METER    pantoprazole (PROTONIX) 40 MG tablet Take 1 tablet (40 mg total) by mouth once daily.    pen needle, diabetic (BD ULTRA-FINE JIM PEN NEEDLE) 32 gauge x 5/32" Ndle Uses 4 times daily, on multiple daily insulin injections    predniSONE (DELTASONE) 5 MG tablet Take 1 tablet (5 mg total) by mouth once daily.    rosuvastatin (CRESTOR) 10 MG tablet Take 1 tablet (10 mg total) by mouth once daily.    sevelamer carbonate (RENVELA) 800 mg Tab TAKE 2 TABLETS BY MOUTH 3 TIMES A DAY WITH MEALS.    sodium bicarbonate 650 MG tablet Two in am and two in pm    " sulfamethoxazole-trimethoprim 400-80mg (BACTRIM,SEPTRA) 400-80 mg per tablet Take 1 tablet by mouth every Mon, Wed, Fri.    tacrolimus (PROGRAF) 0.5 MG Cap Take 6 capsules (3 mg total) by mouth every 12 (twelve) hours.    valsartan (DIOVAN) 160 MG tablet Take 1 tablet (160 mg total) by mouth 2 (two) times daily.    [DISCONTINUED] ACCU-CHEK DEANNE PLUS METER Misc USE AS DIRECTED     Family History     Problem Relation (Age of Onset)    Blindness Father    Diabetes Father, Brother    Hypertension Mother    Kidney disease Sister        Tobacco Use    Smoking status: Never Smoker    Smokeless tobacco: Never Used   Substance and Sexual Activity    Alcohol use: No    Drug use: No    Sexual activity: Not on file     Review of Systems   Constitutional: Positive for activity change and appetite change. Negative for chills, fatigue, fever and unexpected weight change.   HENT: Negative for congestion.    Eyes: Negative for visual disturbance.   Respiratory: Positive for shortness of breath. Negative for chest tightness.    Cardiovascular: Negative for chest pain and palpitations.   Gastrointestinal: Negative for abdominal distention, abdominal pain, blood in stool, constipation, diarrhea, nausea and vomiting.   Genitourinary: Negative for dysuria and hematuria.   Musculoskeletal: Negative for arthralgias and back pain.   Skin: Negative for color change.   Neurological: Negative for dizziness, syncope and numbness.   Psychiatric/Behavioral: Negative for confusion.   All other systems reviewed and are negative.    Objective:     Vital Signs (Most Recent):  Temp: 97.5 °F (36.4 °C) (02/13/22 0344)  Pulse: 83 (02/13/22 0344)  Resp: 18 (02/13/22 0119)  BP: 121/74 (02/13/22 0344)  SpO2: 96 % (02/13/22 0344) Vital Signs (24h Range):  Temp:  [97.4 °F (36.3 °C)-97.7 °F (36.5 °C)] 97.5 °F (36.4 °C)  Pulse:  [83-92] 83  Resp:  [18] 18  SpO2:  [94 %-97 %] 96 %  BP: (106-129)/(74-88) 121/74     Weight: 94.7 kg (208 lb 12.4 oz)  Body  mass index is 29.12 kg/m².    Physical Exam  Vitals reviewed.   Constitutional:       General: He is not in acute distress.     Comments: Alert oriented x4, appears short of breath at times unable to finish his sentence   HENT:      Head: Normocephalic.      Mouth/Throat:      Mouth: Mucous membranes are moist.   Eyes:      General: No scleral icterus.     Extraocular Movements: Extraocular movements intact.      Pupils: Pupils are equal, round, and reactive to light.   Cardiovascular:      Rate and Rhythm: Normal rate and regular rhythm.      Heart sounds: No murmur heard.      Pulmonary:      Effort: Pulmonary effort is normal. No respiratory distress.      Breath sounds: Wheezing and rales present. No rhonchi.      Comments: On 2 L nasal cannula satting 95%  Abdominal:      General: Abdomen is flat. There is no distension.      Palpations: Abdomen is soft.      Tenderness: There is no abdominal tenderness. There is no guarding.   Musculoskeletal:         General: No swelling. Normal range of motion.      Cervical back: Normal range of motion.      Right lower leg: No edema.      Left lower leg: No edema.   Skin:     General: Skin is warm.      Capillary Refill: Capillary refill takes less than 2 seconds.   Neurological:      General: No focal deficit present.      Mental Status: He is alert and oriented to person, place, and time.      Motor: No weakness.           CRANIAL NERVES     CN III, IV, VI   Pupils are equal, round, and reactive to light.       Significant Labs:   All pertinent labs within the past 24 hours have been reviewed.  A1C:   Recent Labs   Lab 09/20/21  0901 12/13/21  0658   HGBA1C 9.5* 8.1*     ABGs: No results for input(s): PH, PCO2, HCO3, POCSATURATED, BE, TOTALHB, COHB, METHB, O2HB, POCFIO2, PO2 in the last 48 hours.  Bilirubin:   Recent Labs   Lab 02/07/22  1256   BILIDIR 0.2   BILITOT 0.6     Blood Culture: No results for input(s): LABBLOO in the last 48 hours.  BMP: No results for  input(s): GLU, NA, K, CL, CO2, BUN, CREATININE, CALCIUM, MG in the last 48 hours.  CBC: No results for input(s): WBC, HGB, HCT, PLT in the last 48 hours.  CMP: No results for input(s): NA, K, CL, CO2, GLU, BUN, CREATININE, CALCIUM, PROT, ALBUMIN, BILITOT, ALKPHOS, AST, ALT, ANIONGAP, EGFRNONAA in the last 48 hours.    Invalid input(s): ESTGFAFRICA  Cardiac Markers:   Recent Labs   Lab 02/13/22 0115   *     Coagulation:   Recent Labs   Lab 02/13/22 0115   INR 1.0   APTT 55.2*     Lactic Acid:   Recent Labs   Lab 02/13/22 0115   LACTATE 1.0     Lipase: No results for input(s): LIPASE in the last 48 hours.  Lipid Panel: No results for input(s): CHOL, HDL, LDLCALC, TRIG, CHOLHDL in the last 48 hours.  Magnesium: No results for input(s): MG in the last 48 hours.  Pathology Results  (Last 10 years)               04/16/21 0949  Specimen to Pathology, Radiology Kidney, [place] biopsy Final result    Narrative:  LM, IF, C4D,EM   Release to patient->Immediate       01/13/21 0934  Specimen to Pathology, Surgery Pulmonary and Thoracic Final result    Narrative:  Pre-op Diagnosis: Complication of lung transplant   Number of specimens: 8   Name of specimens: RLL TBBx   Release to patient->7 Day Delay   Specimen total (fresh, frozen, permanent):->8           POCT Glucose: No results for input(s): POCTGLUCOSE in the last 48 hours.  Prealbumin: No results for input(s): PREALBUMIN in the last 48 hours.  Respiratory Culture: No results for input(s): GSRESP, RESPIRATORYC in the last 48 hours.  Troponin:   Recent Labs   Lab 02/13/22 0115   TROPONINI 0.026     TSH: No results for input(s): TSH in the last 4320 hours.  Urine Culture: No results for input(s): LABURIN in the last 48 hours.  Urine Studies: No results for input(s): COLORU, APPEARANCEUA, PHUR, SPECGRAV, PROTEINUA, GLUCUA, KETONESU, BILIRUBINUA, OCCULTUA, NITRITE, UROBILINOGEN, LEUKOCYTESUR, RBCUA, WBCUA, BACTERIA, SQUAMEPITHEL, HYALINECASTS in the last 48  hours.    Invalid input(s): BAR    Significant Imaging: I have reviewed all pertinent imaging results/findings within the past 24 hours.    Assessment/Plan:     * COVID-19  Patient is identified as High risk for severe complications of COVID 19 based on COVID risk score of 6   Initiate standard COVID protocols; COVID-19 testing Collection Date: 2/11/2022 Collection Time:   2:05 PM ,Infection Control notification  and isolation- respiratory, contact and droplet per protocol    Diagnostics: (leukopenia, hyponatremia, hyperferritinemia, elevated troponin, elevated d-dimer, age, and comorbidities are significant predictors of poor clinical outcome)  CBC, CMP, Procalcitonin, Ferritin, CRP, LDH, BNP, ECG, Blood Culture x2, Portable CXR, UA and culture, PTT and INR    Management: Initiate targeted therapy with Remdesivir, 200mg IV x1, followed by 100mg IV daily x5 days total and Dexamethasone PO/IV 6mg daily x10 days, Maintain oxygen saturations 92-96% via Nasal Cannula  LPM and monitor with continuous/intermittent pulse oximetry. , Inhaled bronchodilators as needed for shortness of breath., Continuous cardiac monitoring. and Manage respiratory failure (O2 requirement >10LPM or needing NIPPV/Mechanical ventilation) and/or Pneumonia (active chest infiltrates) separately as described below.  - satting well on 2 L nasal cannula  - heparin drip continued, was started at outside hospital (was initially on Lovenox)   - continue on antibiotic coverage, pending ID and transplant eval      Advance Care Planning  Current advance care plan has been discussed with patient/family/POA and patient currently wishes Full Code.        Lung replaced by transplant    Patient status post lung transplant x2  Transfer to [facility] for lung transplant services  Patient is on home Prograf and everolimus    - continue home Prograf and everolimus  - continue weaning oxygen as tolerated  - transplant Pulm consulted  - ID consulted      ESRD (end  stage renal disease)    Patient is on dialysis Tuesday Thursday and Saturday, however over the past week outside hospital he has been receiving dialysis Monday Wednesday and Friday, with most recent being Friday 11th.    - consult nephrology  - phosphate binder t.i.d.  - avoid nephrotoxic agents  - monitor CMP    Hypertension associated with diabetes  Continue home clonidine, valsartan, amlodipine and Coreg      Mixed hyperlipidemia  Continue home statin       Type 2 diabetes mellitus with hyperglycemia, with long-term current use of insulin  -Last A1c reviewed-   Lab Results   Component Value Date    HGBA1C 8.1 (H) 12/13/2021       Home Antihyperglycemic Regiment:  -LDSSI, Aspart 10 u TID and deglu 20 u QHS    Inpatient Antihyperglycemic Regiment:  Antihyperglycemics (From admission, onward)            Start     Stop Route Frequency Ordered    02/13/22 0132  insulin aspart U-100 pen 0-5 Units         -- SubQ Before meals & nightly PRN 02/13/22 0034        - Start Detemir 5 units QHS for now and titrate as more data available   - SSI with POCT accuchecks ACHS and Diabetic diet 2000 byron  - Diabetic nutritional counseling given   - Goal 140-180 while IP          VTE Risk Mitigation (From admission, onward)         Ordered     heparin 25,000 units in dextrose 5% (100 units/ml) IV bolus from bag - ADDITIONAL PRN BOLUS - 60 units/kg  As needed (PRN)        Question:  Heparin Infusion Adjustment (DO NOT MODIFY ANSWER)  Answer:  [email or web address]\Images\Pharmacy\HeparinInfusions\heparin LOW INTENSITY nomogram with ANTI-XA [id number]    02/13/22 0611     heparin 25,000 units in dextrose 5% (100 units/ml) IV bolus from bag - ADDITIONAL PRN BOLUS - 30 units/kg  As needed (PRN)        Question:  Heparin Infusion Adjustment (DO NOT MODIFY ANSWER)  Answer:  [email or web address]\Images\Pharmacy\HeparinInfusions\heparin LOW INTENSITY nomogram with ANTI-XA [id number]    02/13/22 0611     heparin 25,000 units in dextrose 5% 250 mL  (100 units/mL) infusion LOW INTENSITY nomogram Anti- Xa  Continuous        Question Answer Comment   Heparin Infusion Adjustment (DO NOT MODIFY ANSWER) [email or web address]\Images\Pharmacy\HeparinInfusions\heparin LOW INTENSITY nomogram with ANTI-XA [id number]    Begin at (in units/kg/hr) 12        02/13/22 0611     IP VTE HIGH RISK PATIENT  Once         02/13/22 0034     Place sequential compression device  Until discontinued         02/13/22 0034                   Sophie Jones MD  Department of Hospital Medicine   [facility] - Intensive Care ([place]-16)

## 2022-02-13 NOTE — PROGRESS NOTES
[facility] - Intensive [facility] ([place] [address])  Infectious Disease  Progress Note    Patient Name: Martin Hendrix Jr.  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 Suspect fluid overload in setting of viral PNA    Recommendations    1. Agree with holding off antibiotics.  2. Continue Rem/Dexa per COVID-19 protocol.  3. Follow up C.diff and CMV.            Thank you for your consult. I will follow-up with patient. Please contact us if you have any additional questions.    Beto Carballo MD  Infectious Disease  [facility] - Intensive [facility] ([place] [address])    Subjective:     Principal Problem:COVID-19    HPI: Martin Hendrix Jr. 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 Peacock    DM (diabetes mellitus) 11/2017     02/22/2021 Insulin x 2017    Hypertension     On home oxygen therapy     KRISTYN on CPAP     Osteopenia     Pancreatitis 2009    hospitalized    Pulmonary hypertension     Pure hypercholesterolemia 11/15/2017    Sarcoidosis     Shortness of breath     Type 2 diabetes mellitus 11/5/2017       Past Surgical History:   Procedure Laterality Date    ABDOMINAL SURGERY      6 weeks old    AV FISTULA PLACEMENT Left 10/13/2021    Procedure: CREATION, AV FISTULA;  Surgeon: CARLI Thomason II, MD;  Location: [facility] OR 2ND FLR;  Service: Vascular;  Laterality: Left;    BRONCHOSCOPY      BRONCHOSCOPY N/A 8/22/2018    Procedure: flexible bronchoscopy with tissue biopsy CPT 88164;  Surgeon: [facility] Diagnostic Provider;  [facility];  Service: Endoscopy;  Laterality: N/A;    BRONCHOSCOPY N/A 11/20/2018    Procedure: flexible bronchoscopy with tissue biopy CPT 74626;  Surgeon: [facility] Diagnostic Provider;  [facility];  Service: Anesthesiology;  Laterality: N/A;    BRONCHOSCOPY N/A 11/10/2021    Procedure: Flexible bronchoscopy;  Surgeon: Samantha Bill DO;  Location: [facility] OR 2ND FLR;  Service: Endoscopy;  Laterality: N/A;    CHEST TUBE INSERTION      CHOLECYSTECTOMY      COLONOSCOPY      ESOPHAGOGASTRODUODENOSCOPY N/A 8/6/2018    Procedure: EGD (ESOPHAGOGASTRODUODENOSCOPY);  Surgeon: Ramu Eisenberg MD;  Location: [facility] ENDO (2ND FLR);  Service: Endoscopy;  Laterality: N/A;  off pepcid and all acid reducers for 2 weeks; PA > 50    FISTULOGRAM Left 1/10/2022    Procedure: Fistulogram;  Surgeon: CARLI Thomason II, MD;  Location: [facility] CATH LAB;  Service: Vascular;  Laterality: Left;    INSERTION OF TUNNELED CENTRAL VENOUS  HEMODIALYSIS CATHETER N/A 3/13/2019    Procedure: INSERTION, CATHETER, CENTRAL VENOUS, HEMODIALYSIS, TUNNELED;  Surgeon: Edy Gonzalez MD;  Location: [facility] CATH LAB;  Service: Nephrology;  Laterality: N/A;    INSERTION OF TUNNELED CENTRAL VENOUS HEMODIALYSIS CATHETER Right 5/7/2021    Procedure: Insertion, Catheter, Central Venous, Hemodialysis;  Surgeon: Mary Joshi MD;  Location: [facility] CATH LAB;  Service: Interventional Nephrology;  Laterality: Right;    LUNG BIOPSY      LUNG TRANSPLANT  08/2017    PERCUTANEOUS TRANSLUMINAL ANGIOPLASTY OF ARTERIOVENOUS FISTULA N/A 1/10/2022    Procedure: PTA, AV FISTULA;  Surgeon: CARLI Thomason II, MD;  Location: [facility] CATH LAB;  Service: Vascular;  Laterality: N/A;    REMOVAL OF TUNNELED CENTRAL VENOUS CATHETER (CVC) N/A 5/16/2019    Procedure: REMOVAL, CATHETER, CENTRAL VENOUS, TUNNELED;  Surgeon: Edy Gonzalez MD;  Location: [facility] CATH LAB;  Service: Nephrology;  Laterality: N/A;    REVISION OF ARTERIOVENOUS FISTULA Left 11/18/2021    Procedure: REVISION, AV FISTULA;  Surgeon: CARLI Thomason II, MD;  Location: [facility] OR [facility] FLR;  Service: Vascular;  Laterality: Left;  Ligation of side branches    TONSILLECTOMY         Review of patient's allergies indicates:  No Known Allergies    Medications:  Medications Prior to Admission   Medication Sig    amLODIPine (NORVASC) 10 MG tablet TAKE 1 TABLET BY MOUTH EVERY DAY    azithromycin (Z-REYNA) 250 MG tablet Take 1 tablet (250 mg total) by mouth every Mon, Wed, Fri.    carvediloL (COREG) 3.125 MG tablet One po BID, hold if SBP < 130    cholecalciferol, vitamin D3, (VITAMIN D3) 25 mcg (1,000 unit) capsule Take 2 capsules (2,000 Units total) by mouth once daily.    cinacalcet (SENSIPAR) 30 MG Tab One po QDAY, with largest meal    cloNIDine (CATAPRES) 0.1 MG tablet Take 1 tablet (0.1 mg total) by mouth 3 (three) times daily.    ECOTRIN LOW STRENGTH 81 mg EC tablet TAKE 1 TABLET DAILY    epoetin greer-epbx  "(RETACRIT) 4,000 unit/mL injection Inject 1.41 mLs (5,640 Units total) into the skin every Tues, Thurs, Sat. Administered by dialysis unit on T/Th/Sat.    everolimus, immunosuppressive, (ZORTRESS) 0.75 mg tablet Take 1 tablet (0.75 mg total) by mouth every 12 (twelve) hours.    folic acid (FOLVITE) 1 MG tablet TAKE 1 TABLET DAILY    furosemide (LASIX) 40 MG tablet TAKE 1 TABLET BY MOUTH EVERY DAY AS NEEDED FOR LEG SWELLING    insulin aspart U-100 (NOVOLOG FLEXPEN U-100 INSULIN) 100 unit/mL (3 mL) InPn pen Inject 10 Units into the skin 3 (three) times daily with meals.    insulin aspart U-100 (NOVOLOG) 100 unit/mL (3 mL) InPn pen Inject 0-5 Units into the skin before meals and at bedtime as needed (Hyperglycemia). Use in addition to 10 units TID with meals    insulin degludec (TRESIBA FLEXTOUCH U-200) 200 unit/mL (3 mL) insulin pen Inject 20 Units into the skin every evening. (Patient taking differently: Inject 26 Units into the skin every evening.)    lancets Misc To check BG 4 times daily, to use with insurance preferred meter    magnesium chloride (MAG 64) 64 mg TbEC Take 2 tablets (128 mg total) by mouth 2 (two) times daily.    ondansetron (ZOFRAN-ODT) 8 MG TbDL Dissolve 1 tablet (8 mg total) by mouth daily as needed (pre-med for IVIG infusions).    ONETOUCH VERIO Strp USE TO CHECK BLOOD GLUCOSE FOUR TIMES A DAY, TO USE WITH INSURANCE PREFERRED METER    pantoprazole (PROTONIX) 40 MG tablet Take 1 tablet (40 mg total) by mouth once daily.    pen needle, diabetic (BD ULTRA-FINE JIM PEN NEEDLE) 32 gauge x 5/32" Ndle Uses 4 times daily, on multiple daily insulin injections    predniSONE (DELTASONE) 5 MG tablet Take 1 tablet (5 mg total) by mouth once daily.    rosuvastatin (CRESTOR) 10 MG tablet Take 1 tablet (10 mg total) by mouth once daily.    sevelamer carbonate (RENVELA) 800 mg Tab TAKE 2 TABLETS BY MOUTH 3 TIMES A DAY WITH MEALS.    sodium bicarbonate 650 MG tablet Two in am and two in pm    " sulfamethoxazole-trimethoprim 400-80mg (BACTRIM,SEPTRA) 400-80 mg per tablet Take 1 tablet by mouth every Mon, Wed, Fri.    tacrolimus (PROGRAF) 0.5 MG Cap Take 6 capsules (3 mg total) by mouth every 12 (twelve) hours.    valsartan (DIOVAN) 160 MG tablet Take 1 tablet (160 mg total) by mouth 2 (two) times daily.     Antibiotics (From admission, onward)            Start     Stop Route Frequency Ordered    02/13/22 0900  mupirocin 2 % ointment         02/18 0859 Nasl 2 times daily 02/13/22 0036        Antifungals (From admission, onward)            None        Antivirals (From admission, onward)        Stop Route Frequency     remdesivir 100 mg         02/18 0859 IV Daily           Immunization History   Administered Date(s) Administered    COVID-19, MRNA, LN-S, PF (Pfizer) (Purple Cap) 03/19/2021, 04/09/2021, 08/26/2021    Hepatitis A, Adult 04/26/2017, 02/07/2018    Hepatitis B 04/26/2017, 08/10/2017    Hepatitis B, Dialysis, 3 Dose 04/26/2017, 08/10/2017    Influenza 10/09/2018, 09/11/2020    Influenza - Quadrivalent - PF *Preferred* (6 months and older) 10/08/2014, 10/23/2015, 10/26/2016, 12/16/2019, 09/06/2020, 10/19/2021    Influenza - Trivalent - PF (ADULT) 03/29/2013    PPD Test 05/03/2021, 05/20/2021, 08/31/2021    Pneumococcal 10/09/2020    Pneumococcal Conjugate - 13 Valent 12/14/2020    Pneumococcal Polysaccharide - 23 Valent 02/07/2018       Family History     Problem Relation (Age of Onset)    Blindness Father    Diabetes Father, Brother    Hypertension Mother    Kidney disease Sister        Social History     Socioeconomic History    Marital status:    Tobacco Use    Smoking status: Never Smoker    Smokeless tobacco: Never Used   Substance and Sexual Activity    Alcohol use: No    Drug use: No     Review of Systems   Constitutional: Positive for activity change. Negative for appetite change, chills and fever.   HENT: Negative for congestion.    Eyes: Negative for pain and  itching.   Respiratory: Negative for cough, chest tightness and shortness of breath.    Cardiovascular: Negative for palpitations and leg swelling.   Gastrointestinal: Positive for diarrhea. Negative for abdominal pain and nausea.   Endocrine: Negative for polyphagia and polyuria.   Genitourinary: Negative for dysuria and frequency.   Musculoskeletal: Negative for back pain.   Neurological: Negative for numbness and headaches.   Psychiatric/Behavioral: Negative for agitation and behavioral problems.     Objective:     Vital Signs (Most Recent):  Temp: 97.8 °F (36.6 °C) (02/13/22 1124)  Pulse: 89 (02/13/22 1427)  Resp: 18 (02/13/22 1427)  BP: 115/72 (02/13/22 1427)  SpO2: 95 % (02/13/22 1427) Vital Signs (24h Range):  Temp:  [97.4 °F (36.3 °C)-97.8 °F (36.6 °C)] 97.8 °F (36.6 °C)  Pulse:  [76-97] 89  Resp:  [18-22] 18  SpO2:  [94 %-97 %] 95 %  BP: (106-138)/(67-85) 115/72     Weight: 94.7 kg (208 lb 12.4 oz)  Body mass index is 29.12 kg/m².    Estimated Creatinine Clearance: 7.8 mL/min (A) (based on SCr of 12 mg/dL (H)).    Physical Exam  Constitutional:       Appearance: He is well-developed.   HENT:      Head: Normocephalic.   Cardiovascular:      Rate and Rhythm: Normal rate and regular rhythm.      Heart sounds: Normal heart sounds. No murmur heard.      Pulmonary:      Effort: Pulmonary effort is normal.      Breath sounds: Normal breath sounds.   Abdominal:      General: Bowel sounds are normal. There is no distension.      Palpations: Abdomen is soft.      Tenderness: There is no abdominal tenderness.   Musculoskeletal:      Comments: Left arm graft with tenderness    Neurological:      Mental Status: He is alert and oriented to person, place, and time.   Psychiatric:         Mood and Affect: Mood and affect normal.         Behavior: Behavior normal.         Significant Labs: All pertinent labs within the past 24 hours have been reviewed.    Significant Imaging: I have reviewed all pertinent imaging  results/findings within the past 24 hours.

## 2022-02-13 NOTE — ASSESSMENT & PLAN NOTE
Patient is identified as High risk for severe complications of COVID 19 based on COVID risk score of 6   Initiate standard COVID protocols; COVID-19 testing Collection Date: 2/11/2022 Collection Time:   2:05 PM ,Infection Control notification  and isolation- respiratory, contact and droplet per protocol    Diagnostics: (leukopenia, hyponatremia, hyperferritinemia, elevated troponin, elevated d-dimer, age, and comorbidities are significant predictors of poor clinical outcome)  CBC, CMP, Procalcitonin, Ferritin, CRP, LDH, BNP, ECG, Blood Culture x2, Portable CXR, UA and culture, PTT and INR    Management: Initiate targeted therapy with Remdesivir, 200mg IV x1, followed by 100mg IV daily x5 days total and Dexamethasone PO/IV 6mg daily x10 days, Maintain oxygen saturations 92-96% via Nasal Cannula  LPM and monitor with continuous/intermittent pulse oximetry. , Inhaled bronchodilators as needed for shortness of breath., Continuous cardiac monitoring. and Manage respiratory failure (O2 requirement >10LPM or needing NIPPV/Mechanical ventilation) and/or Pneumonia (active chest infiltrates) separately as described below.  - satting well on 2 L nasal cannula  - heparin drip continued, was started at outside hospital (was initially on Lovenox)   - continue on antibiotic coverage, pending ID and transplant eval      Advance Care Planning  Current advance care plan has been discussed with patient/family/POA and patient currently wishes Full Code.

## 2022-02-13 NOTE — HPI
Martin Hendrix Jr. 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

## 2022-02-13 NOTE — ASSESSMENT & PLAN NOTE
Patient status post lung transplant x2  Transfer to [facility] for lung transplant services  Patient is on home Prograf and everolimus    - continue home Prograf and everolimus  - continue weaning oxygen as tolerated  - transplant Pulm consulted  - ID consulted

## 2022-02-13 NOTE — SUBJECTIVE & OBJECTIVE
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 Peacock    DM (diabetes mellitus) 11/2017     02/22/2021 Insulin x 2017    Hypertension     On home oxygen therapy     KRISTYN on CPAP     Osteopenia     Pancreatitis 2009    hospitalized    Pulmonary hypertension     Pure hypercholesterolemia 11/15/2017    Sarcoidosis     Shortness of breath     Type 2 diabetes mellitus 11/5/2017       Past Surgical History:   Procedure Laterality Date    ABDOMINAL SURGERY      6 weeks old    AV FISTULA PLACEMENT Left 10/13/2021    Procedure: CREATION, AV FISTULA;  Surgeon: CARLI Thomason II, MD;  Location: [facility] OR 2ND FLR;  Service: Vascular;  Laterality: Left;    BRONCHOSCOPY      BRONCHOSCOPY N/A 8/22/2018    Procedure: flexible bronchoscopy with tissue biopsy CPT 96798;  Surgeon: [facility] Diagnostic Provider;  [facility];  Service: Endoscopy;  Laterality: N/A;    BRONCHOSCOPY N/A 11/20/2018    Procedure: flexible bronchoscopy with tissue biopy CPT 49538;  Surgeon: [facility] Diagnostic Provider;  Location: [facility] OR [facility] FLR;  Service: Anesthesiology;  Laterality: N/A;    BRONCHOSCOPY N/A 11/10/2021    Procedure: Flexible bronchoscopy;  Surgeon: Samantha Bill DO;  [facility];  Service: Endoscopy;  Laterality: N/A;    CHEST TUBE INSERTION      CHOLECYSTECTOMY      COLONOSCOPY      ESOPHAGOGASTRODUODENOSCOPY N/A 8/6/2018    Procedure: EGD (ESOPHAGOGASTRODUODENOSCOPY);  Surgeon: Ramu Eisenberg MD;  Location: [facility] ENDO (2ND FLR);  Service: Endoscopy;  Laterality: N/A;  off pepcid and all acid reducers for 2 weeks; PA > 50    FISTULOGRAM Left 1/10/2022    Procedure: Fistulogram;  Surgeon: CARLI Thomason II, MD;  Location: [facility] CATH LAB;  Service: Vascular;  Laterality: Left;    INSERTION OF TUNNELED  CENTRAL VENOUS HEMODIALYSIS CATHETER N/A 3/13/2019    Procedure: INSERTION, CATHETER, CENTRAL VENOUS, HEMODIALYSIS, TUNNELED;  Surgeon: Edy Gonzalez MD;  Location: [facility] CATH LAB;  Service: Nephrology;  Laterality: N/A;    INSERTION OF TUNNELED CENTRAL VENOUS HEMODIALYSIS CATHETER Right 5/7/2021    Procedure: Insertion, Catheter, Central Venous, Hemodialysis;  Surgeon: Mary Joshi MD;  Location: [facility] CATH LAB;  Service: Interventional Nephrology;  Laterality: Right;    LUNG BIOPSY      LUNG TRANSPLANT  08/2017    PERCUTANEOUS TRANSLUMINAL ANGIOPLASTY OF ARTERIOVENOUS FISTULA N/A 1/10/2022    Procedure: PTA, AV FISTULA;  Surgeon: CARLI Thomason II, MD;  Location: [facility] CATH LAB;  Service: Vascular;  Laterality: N/A;    REMOVAL OF TUNNELED CENTRAL VENOUS CATHETER (CVC) N/A 5/16/2019    Procedure: REMOVAL, CATHETER, CENTRAL VENOUS, TUNNELED;  Surgeon: Edy Gonzalez MD;  Location: [facility] CATH LAB;  Service: Nephrology;  Laterality: N/A;    REVISION OF ARTERIOVENOUS FISTULA Left 11/18/2021    Procedure: REVISION, AV FISTULA;  Surgeon: CARLI Thomason II, MD;  Location: [facility] OR [facility] FLR;  Service: Vascular;  Laterality: Left;  Ligation of side branches    TONSILLECTOMY         Review of patient's allergies indicates:  No Known Allergies    Medications:  Medications Prior to Admission   Medication Sig    amLODIPine (NORVASC) 10 MG tablet TAKE 1 TABLET BY MOUTH EVERY DAY    azithromycin (Z-REYNA) 250 MG tablet Take 1 tablet (250 mg total) by mouth every Mon, Wed, Fri.    carvediloL (COREG) 3.125 MG tablet One po BID, hold if SBP < 130    cholecalciferol, vitamin D3, (VITAMIN D3) 25 mcg (1,000 unit) capsule Take 2 capsules (2,000 Units total) by mouth once daily.    cinacalcet (SENSIPAR) 30 MG Tab One po QDAY, with largest meal    cloNIDine (CATAPRES) 0.1 MG tablet Take 1 tablet (0.1 mg total) by mouth 3 (three) times daily.    ECOTRIN LOW STRENGTH 81 mg EC tablet TAKE 1 TABLET DAILY    epoetin  "greer-epbx (RETACRIT) 4,000 unit/mL injection Inject 1.41 mLs (5,640 Units total) into the skin every Tues, Thurs, Sat. Administered by dialysis unit on T/Th/Sat.    everolimus, immunosuppressive, (ZORTRESS) 0.75 mg tablet Take 1 tablet (0.75 mg total) by mouth every 12 (twelve) hours.    folic acid (FOLVITE) 1 MG tablet TAKE 1 TABLET DAILY    furosemide (LASIX) 40 MG tablet TAKE 1 TABLET BY MOUTH EVERY DAY AS NEEDED FOR LEG SWELLING    insulin aspart U-100 (NOVOLOG FLEXPEN U-100 INSULIN) 100 unit/mL (3 mL) InPn pen Inject 10 Units into the skin 3 (three) times daily with meals.    insulin aspart U-100 (NOVOLOG) 100 unit/mL (3 mL) InPn pen Inject 0-5 Units into the skin before meals and at bedtime as needed (Hyperglycemia). Use in addition to 10 units TID with meals    insulin degludec (TRESIBA FLEXTOUCH U-200) 200 unit/mL (3 mL) insulin pen Inject 20 Units into the skin every evening. (Patient taking differently: Inject 26 Units into the skin every evening.)    lancets Misc To check BG 4 times daily, to use with insurance preferred meter    magnesium chloride (MAG 64) 64 mg TbEC Take 2 tablets (128 mg total) by mouth 2 (two) times daily.    ondansetron (ZOFRAN-ODT) 8 MG TbDL Dissolve 1 tablet (8 mg total) by mouth daily as needed (pre-med for IVIG infusions).    ONETOUCH VERIO Strp USE TO CHECK BLOOD GLUCOSE FOUR TIMES A DAY, TO USE WITH INSURANCE PREFERRED METER    pantoprazole (PROTONIX) 40 MG tablet Take 1 tablet (40 mg total) by mouth once daily.    pen needle, diabetic (BD ULTRA-FINE JIM PEN NEEDLE) 32 gauge x 5/32" Ndle Uses 4 times daily, on multiple daily insulin injections    predniSONE (DELTASONE) 5 MG tablet Take 1 tablet (5 mg total) by mouth once daily.    rosuvastatin (CRESTOR) 10 MG tablet Take 1 tablet (10 mg total) by mouth once daily.    sevelamer carbonate (RENVELA) 800 mg Tab TAKE 2 TABLETS BY MOUTH 3 TIMES A DAY WITH MEALS.    sodium bicarbonate 650 MG tablet Two in am and two " in pm    sulfamethoxazole-trimethoprim 400-80mg (BACTRIM,SEPTRA) 400-80 mg per tablet Take 1 tablet by mouth every Mon, Wed, Fri.    tacrolimus (PROGRAF) 0.5 MG Cap Take 6 capsules (3 mg total) by mouth every 12 (twelve) hours.    valsartan (DIOVAN) 160 MG tablet Take 1 tablet (160 mg total) by mouth 2 (two) times daily.     Antibiotics (From admission, onward)            Start     Stop Route Frequency Ordered    02/13/22 0900  mupirocin 2 % ointment         02/18 0859 Nasl 2 times daily 02/13/22 0036        Antifungals (From admission, onward)            None        Antivirals (From admission, onward)        Stop Route Frequency     remdesivir 100 mg         02/18 0859 IV Daily           Immunization History   Administered Date(s) Administered    COVID-19, MRNA, LN-S, PF (Pfizer) (Purple Cap) 03/19/2021, 04/09/2021, 08/26/2021    Hepatitis A, Adult 04/26/2017, 02/07/2018    Hepatitis B 04/26/2017, 08/10/2017    Hepatitis B, Dialysis, 3 Dose 04/26/2017, 08/10/2017    Influenza 10/09/2018, 09/11/2020    Influenza - Quadrivalent - PF *Preferred* (6 months and older) 10/08/2014, 10/23/2015, 10/26/2016, 12/16/2019, 09/06/2020, 10/19/2021    Influenza - Trivalent - PF (ADULT) 03/29/2013    PPD Test 05/03/2021, 05/20/2021, 08/31/2021    Pneumococcal 10/09/2020    Pneumococcal Conjugate - 13 Valent 12/14/2020    Pneumococcal Polysaccharide - 23 Valent 02/07/2018       Family History     Problem Relation (Age of Onset)    Blindness Father    Diabetes Father, Brother    Hypertension Mother    Kidney disease Sister        Social History     Socioeconomic History    Marital status:    Tobacco Use    Smoking status: Never Smoker    Smokeless tobacco: Never Used   Substance and Sexual Activity    Alcohol use: No    Drug use: No     Review of Systems   Constitutional: Positive for activity change. Negative for appetite change, chills and fever.   HENT: Negative for congestion.    Eyes: Negative for pain  and itching.   Respiratory: Negative for cough, chest tightness and shortness of breath.    Cardiovascular: Negative for palpitations and leg swelling.   Gastrointestinal: Positive for diarrhea. Negative for abdominal pain and nausea.   Endocrine: Negative for polyphagia and polyuria.   Genitourinary: Negative for dysuria and frequency.   Musculoskeletal: Negative for back pain.   Neurological: Negative for numbness and headaches.   Psychiatric/Behavioral: Negative for agitation and behavioral problems.     Objective:     Vital Signs (Most Recent):  Temp: 97.8 °F (36.6 °C) (02/13/22 1124)  Pulse: 89 (02/13/22 1427)  Resp: 18 (02/13/22 1427)  BP: 115/72 (02/13/22 1427)  SpO2: 95 % (02/13/22 1427) Vital Signs (24h Range):  Temp:  [97.4 °F (36.3 °C)-97.8 °F (36.6 °C)] 97.8 °F (36.6 °C)  Pulse:  [76-97] 89  Resp:  [18-22] 18  SpO2:  [94 %-97 %] 95 %  BP: (106-138)/(67-85) 115/72     Weight: 94.7 kg (208 lb 12.4 oz)  Body mass index is 29.12 kg/m².    Estimated Creatinine Clearance: 7.8 mL/min (A) (based on SCr of 12 mg/dL (H)).    Physical Exam  Constitutional:       Appearance: He is well-developed.   HENT:      Head: Normocephalic.   Cardiovascular:      Rate and Rhythm: Normal rate and regular rhythm.      Heart sounds: Normal heart sounds. No murmur heard.      Pulmonary:      Effort: Pulmonary effort is normal.      Breath sounds: Normal breath sounds.   Abdominal:      General: Bowel sounds are normal. There is no distension.      Palpations: Abdomen is soft.      Tenderness: There is no abdominal tenderness.   Musculoskeletal:      Comments: Left arm graft with tenderness    Neurological:      Mental Status: He is alert and oriented to person, place, and time.   Psychiatric:         Mood and Affect: Mood and affect normal.         Behavior: Behavior normal.         Significant Labs: All pertinent labs within the past 24 hours have been reviewed.    Significant Imaging: I have reviewed all pertinent imaging  results/findings within the past 24 hours.

## 2022-02-13 NOTE — ASSESSMENT & PLAN NOTE
Patient is on dialysis Tuesday Thursday and Saturday, however over the past week outside hospital he has been receiving dialysis Monday Wednesday and Friday, with most recent being Friday 11th.    - consult nephrology  - phosphate binder t.i.d.  - avoid nephrotoxic agents  - monitor CMP

## 2022-02-13 NOTE — PLAN OF CARE
Problem: Adult Inpatient Plan of Care  Goal: Plan of Care Review  Outcome: Ongoing, Progressing  Goal: Patient-Specific Goal (Individualized)  Outcome: Ongoing, Progressing  Goal: Optimal Comfort and Wellbeing  Outcome: Ongoing, Progressing     Problem: Infection  Goal: Absence of Infection Signs and Symptoms  Outcome: Ongoing, Progressing

## 2022-02-13 NOTE — ASSESSMENT & PLAN NOTE
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 Suspect fluid overload in setting of viral PNA    Recommendations    1. Agree with holding off antibiotics.  2. Continue Rem/Dexa per COVID-19 protocol.  3. Follow up C.diff and CMV.

## 2022-02-13 NOTE — HPI
Mr. 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  He was transferred from [place] for management of acute on chronic hypoxemic respiratory failure 2/2 COVID-19 pneumonia.     The patient typical uses supplemental oxygen prn with exertion but had noted that he was requiring escalating doses of oxygen even at rest.  Upon admission he was requiring 4L of O2 at rest with O2 Sats of 95%

## 2022-02-13 NOTE — SUBJECTIVE & OBJECTIVE
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 Peacock    DM (diabetes mellitus) 11/2017     02/22/2021 Insulin x 2017    Hypertension     On home oxygen therapy     KRISTYN on CPAP     Osteopenia     Pancreatitis 2009    hospitalized    Pulmonary hypertension     Pure hypercholesterolemia 11/15/2017    Sarcoidosis     Shortness of breath     Type 2 diabetes mellitus 11/5/2017       Past Surgical History:   Procedure Laterality Date    ABDOMINAL SURGERY      6 weeks old    AV FISTULA PLACEMENT Left 10/13/2021    Procedure: CREATION, AV FISTULA;  Surgeon: CARLI Thomason II, MD;  Location: [facility] OR 2ND FLR;  Service: Vascular;  Laterality: Left;    BRONCHOSCOPY      BRONCHOSCOPY N/A 8/22/2018    Procedure: flexible bronchoscopy with tissue biopsy CPT 60707;  Surgeon: [facility] Diagnostic Provider;  [facility];  Service: Endoscopy;  Laterality: N/A;    BRONCHOSCOPY N/A 11/20/2018    Procedure: flexible bronchoscopy with tissue biopy CPT 10253;  Surgeon: [facility] Diagnostic Provider;  Location: [facility] OR [facility] FLR;  Service: Anesthesiology;  Laterality: N/A;    BRONCHOSCOPY N/A 11/10/2021    Procedure: Flexible bronchoscopy;  Surgeon: Samantha Bill DO;  [facility];  Service: Endoscopy;  Laterality: N/A;    CHEST TUBE INSERTION      CHOLECYSTECTOMY      COLONOSCOPY      ESOPHAGOGASTRODUODENOSCOPY N/A 8/6/2018    Procedure: EGD (ESOPHAGOGASTRODUODENOSCOPY);  Surgeon: Ramu Eisenberg MD;  Location: [facility] ENDO (2ND FLR);  Service: Endoscopy;  Laterality: N/A;  off pepcid and all acid reducers for 2 weeks; PA > 50    FISTULOGRAM Left 1/10/2022    Procedure: Fistulogram;  Surgeon: CARLI Thomason II, MD;  Location: [facility] CATH LAB;  Service: Vascular;  Laterality: Left;    INSERTION OF TUNNELED  CENTRAL VENOUS HEMODIALYSIS CATHETER N/A 3/13/2019    Procedure: INSERTION, CATHETER, CENTRAL VENOUS, HEMODIALYSIS, TUNNELED;  Surgeon: Edy Gonzalez MD;  Location: [facility] CATH LAB;  Service: Nephrology;  Laterality: N/A;    INSERTION OF TUNNELED CENTRAL VENOUS HEMODIALYSIS CATHETER Right 5/7/2021    Procedure: Insertion, Catheter, Central Venous, Hemodialysis;  Surgeon: Mary Joshi MD;  Location: [facility] CATH LAB;  Service: Interventional Nephrology;  Laterality: Right;    LUNG BIOPSY      LUNG TRANSPLANT  08/2017    PERCUTANEOUS TRANSLUMINAL ANGIOPLASTY OF ARTERIOVENOUS FISTULA N/A 1/10/2022    Procedure: PTA, AV FISTULA;  Surgeon: CARLI Thomason II, MD;  Location: [facility] CATH LAB;  Service: Vascular;  Laterality: N/A;    REMOVAL OF TUNNELED CENTRAL VENOUS CATHETER (CVC) N/A 5/16/2019    Procedure: REMOVAL, CATHETER, CENTRAL VENOUS, TUNNELED;  Surgeon: Edy Gonzalez MD;  Location: [facility] CATH LAB;  Service: Nephrology;  Laterality: N/A;    REVISION OF ARTERIOVENOUS FISTULA Left 11/18/2021    Procedure: REVISION, AV FISTULA;  Surgeon: CARLI Thomason II, MD;  Location: [facility] OR [facility] FLR;  Service: Vascular;  Laterality: Left;  Ligation of side branches    TONSILLECTOMY         Review of patient's allergies indicates:  No Known Allergies    PTA Medications   Medication Sig    amLODIPine (NORVASC) 10 MG tablet TAKE 1 TABLET BY MOUTH EVERY DAY    azithromycin (Z-REYNA) 250 MG tablet Take 1 tablet (250 mg total) by mouth every Mon, Wed, Fri.    carvediloL (COREG) 3.125 MG tablet One po BID, hold if SBP < 130    cholecalciferol, vitamin D3, (VITAMIN D3) 25 mcg (1,000 unit) capsule Take 2 capsules (2,000 Units total) by mouth once daily.    cinacalcet (SENSIPAR) 30 MG Tab One po QDAY, with largest meal    cloNIDine (CATAPRES) 0.1 MG tablet Take 1 tablet (0.1 mg total) by mouth 3 (three) times daily.    ECOTRIN LOW STRENGTH 81 mg EC tablet TAKE 1 TABLET DAILY    epoetin greer-epbx (RETACRIT) 4,000  "unit/mL injection Inject 1.41 mLs (5,640 Units total) into the skin every Tues, Thurs, Sat. Administered by dialysis unit on T/Th/Sat.    everolimus, immunosuppressive, (ZORTRESS) 0.75 mg tablet Take 1 tablet (0.75 mg total) by mouth every 12 (twelve) hours.    folic acid (FOLVITE) 1 MG tablet TAKE 1 TABLET DAILY    furosemide (LASIX) 40 MG tablet TAKE 1 TABLET BY MOUTH EVERY DAY AS NEEDED FOR LEG SWELLING    insulin aspart U-100 (NOVOLOG FLEXPEN U-100 INSULIN) 100 unit/mL (3 mL) InPn pen Inject 10 Units into the skin 3 (three) times daily with meals.    insulin aspart U-100 (NOVOLOG) 100 unit/mL (3 mL) InPn pen Inject 0-5 Units into the skin before meals and at bedtime as needed (Hyperglycemia). Use in addition to 10 units TID with meals    insulin degludec (TRESIBA FLEXTOUCH U-200) 200 unit/mL (3 mL) insulin pen Inject 20 Units into the skin every evening. (Patient taking differently: Inject 26 Units into the skin every evening.)    lancets Misc To check BG 4 times daily, to use with insurance preferred meter    magnesium chloride (MAG 64) 64 mg TbEC Take 2 tablets (128 mg total) by mouth 2 (two) times daily.    ondansetron (ZOFRAN-ODT) 8 MG TbDL Dissolve 1 tablet (8 mg total) by mouth daily as needed (pre-med for IVIG infusions).    ONETOUCH VERIO Strp USE TO CHECK BLOOD GLUCOSE FOUR TIMES A DAY, TO USE WITH INSURANCE PREFERRED METER    pantoprazole (PROTONIX) 40 MG tablet Take 1 tablet (40 mg total) by mouth once daily.    pen needle, diabetic (BD ULTRA-FINE JIM PEN NEEDLE) 32 gauge x 5/32" Ndle Uses 4 times daily, on multiple daily insulin injections    predniSONE (DELTASONE) 5 MG tablet Take 1 tablet (5 mg total) by mouth once daily.    rosuvastatin (CRESTOR) 10 MG tablet Take 1 tablet (10 mg total) by mouth once daily.    sevelamer carbonate (RENVELA) 800 mg Tab TAKE 2 TABLETS BY MOUTH 3 TIMES A DAY WITH MEALS.    sodium bicarbonate 650 MG tablet Two in am and two in pm    " sulfamethoxazole-trimethoprim 400-80mg (BACTRIM,SEPTRA) 400-80 mg per tablet Take 1 tablet by mouth every Mon, Wed, Fri.    tacrolimus (PROGRAF) 0.5 MG Cap Take 6 capsules (3 mg total) by mouth every 12 (twelve) hours.    valsartan (DIOVAN) 160 MG tablet Take 1 tablet (160 mg total) by mouth 2 (two) times daily.     Family History     Problem Relation (Age of Onset)    Blindness Father    Diabetes Father, Brother    Hypertension Mother    Kidney disease Sister        Tobacco Use    Smoking status: Never Smoker    Smokeless tobacco: Never Used   Substance and Sexual Activity    Alcohol use: No    Drug use: No    Sexual activity: Not on file     Review of Systems   Constitutional: Positive for fatigue. Negative for fever.   HENT: Negative.    Eyes: Negative.    Respiratory: Positive for cough and shortness of breath.    Cardiovascular: Negative.  Negative for chest pain and leg swelling.   Gastrointestinal: Positive for diarrhea.   Endocrine: Negative.    Genitourinary: Negative.    Musculoskeletal: Negative.  Negative for arthralgias.   Skin: Negative.    Allergic/Immunologic: Negative.    Neurological: Negative.    Hematological: Negative.    Psychiatric/Behavioral: Negative.  The patient is not nervous/anxious.      Objective:     Vital Signs (Most Recent):  Temp: 97.7 °F (36.5 °C) (02/13/22 1526)  Pulse: 89 (02/13/22 1427)  Resp: 18 (02/13/22 1427)  BP: 115/72 (02/13/22 1427)  SpO2: 95 % (02/13/22 1427) Vital Signs (24h Range):  Temp:  [97.4 °F (36.3 °C)-97.8 °F (36.6 °C)] 97.7 °F (36.5 °C)  Pulse:  [76-97] 89  Resp:  [18-22] 18  SpO2:  [94 %-97 %] 95 %  BP: (107-138)/(67-85) 115/72     Weight: 94.7 kg (208 lb 12.4 oz)  Body mass index is 29.12 kg/m².      Intake/Output Summary (Last 24 hours) at 2/13/2022 1721  Last data filed at 2/13/2022 1611  Gross per 24 hour   Intake 618.88 ml   Output 100 ml   Net 518.88 ml       Physical Exam  Vitals reviewed.   Constitutional:       Appearance: Normal appearance.  He is well-developed and well-nourished.   HENT:      Head: Normocephalic and atraumatic.   Eyes:      Pupils: Pupils are equal, round, and reactive to light.   Neck:      Trachea: No tracheal deviation.   Cardiovascular:      Rate and Rhythm: Normal rate and regular rhythm.      Heart sounds: No murmur heard.      Pulmonary:      Effort: Pulmonary effort is normal.      Breath sounds: Rales (at right base) present. No wheezing.   Abdominal:      General: Bowel sounds are normal. There is no distension.      Palpations: Abdomen is soft.      Tenderness: There is no abdominal tenderness.   Musculoskeletal:         General: No swelling or edema.      Cervical back: Normal range of motion.   Skin:     General: Skin is warm and dry.      Capillary Refill: Capillary refill takes less than 2 seconds.   Neurological:      General: No focal deficit present.      Mental Status: He is alert and oriented to person, place, and time.   Psychiatric:         Mood and Affect: Mood and affect and mood normal.         Behavior: Behavior normal.         Significant Labs:  CBC:  Recent Labs   Lab 02/13/22  0626   WBC 7.55   RBC 3.34*   HGB 9.9*   HCT 32.7*      MCV 98   MCH 29.6   MCHC 30.3*     BMP:  Recent Labs   Lab 02/13/22  1219   *   K 4.7   CL 93*   CO2 19*   BUN 90*   CREATININE 12.0*   CALCIUM 9.1        I have reviewed all pertinent labs within the past 24 hours.    Diagnostic Results:  CT Chest reviewed. Basilar ground glass opacities consistent with covid.

## 2022-02-13 NOTE — NURSING
Anti-x a WNL at 0.36, no change needed to heparin gtt. MD aware.    Insulin gtt started @ 3ml/hr. Sugar 444 at start. Will recheck qhour and adjust accordingly. Pt denies symptoms of hyperglycemia, medication and treatment plan explained in detail to patient.

## 2022-02-13 NOTE — ASSESSMENT & PLAN NOTE
Patient is prescribed tacrolimus 3 mg BID, everolimus 0.75 mg daily, prednisone 5 mg daily.   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    Tacrolimus levels are elevated.  Hold tacrolimus dose tomorrow and repeat level.  Continue everolimus  Discontinue prednisone while on dexamethasone.    Please resume when dexamethasone course completed.

## 2022-02-13 NOTE — CONSULTS
"[facility] ([place] [id number])  Nephrology  Consult Note    Patient Name: Martin Hendrix Jr.  MRN: [id number]  Admission Date: 2/12/2022  Hospital Length of Stay: 1 days  Attending Provider: Mike Cordoba MD   Primary Care Physician: Michel Henley MD  Principal Problem:COVID-19    Inpatient consult to Nephrology  Consult performed by: Eddie Mathews MD  Consult ordered by: Sophie Jones MD  Reason for consult: ESRD        Subjective:     HPI: 62 yo M patient with PMHx of T2DM, HTN, HLP, KRISTYN on CPAP, ESRD on IHD TTS, sarcoidosis s/p Lung transplant in 2017 on immunosuppression and PRN home oxygen, who presented to OSH ED due to worsening SOB, found to be COVID positive and  transferred to this center due to lung transplant status.     Patient reports he has been having worsening SOB for the last 2 weeks. He did miss an entire week of HD due to "not feeling well". He then presented to the ED and was found to be hypoxic. Tested positive for COVID and was started on dexamethasone, remdesivir, CTX/AZT. He normally dialyzes TTS and reports he was last dialyzed yesterday. He still makes urine but reports not a lot. Reports chills, denies chest pain, abdominal pain or any other symptom.       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 Peacock    DM (diabetes mellitus) 11/2017     02/22/2021 Insulin x 2017    Hypertension     On home oxygen therapy     KRISTYN on CPAP     Osteopenia     Pancreatitis 2009    hospitalized    Pulmonary hypertension     Pure hypercholesterolemia 11/15/2017    Sarcoidosis     Shortness of breath     Type 2 diabetes mellitus 11/5/2017       Past Surgical History:   Procedure Laterality Date    ABDOMINAL SURGERY      6 weeks old    AV FISTULA PLACEMENT Left 10/13/2021    " Procedure: CREATION, AV FISTULA;  Surgeon: CARLI Thomason II, MD;  Location: [facility] OR 2ND FLR;  Service: Vascular;  Laterality: Left;    BRONCHOSCOPY      BRONCHOSCOPY N/A 8/22/2018    Procedure: flexible bronchoscopy with tissue biopsy CPT 88204;  Surgeon: [facility] Diagnostic Provider;  Location: [facility] OR 2ND FLR;  Service: Endoscopy;  Laterality: N/A;    BRONCHOSCOPY N/A 11/20/2018    Procedure: flexible bronchoscopy with tissue biopy CPT 64535;  Surgeon: [facility] Diagnostic Provider;  Location: [facility] OR 2ND FLR;  Service: Anesthesiology;  Laterality: N/A;    BRONCHOSCOPY N/A 11/10/2021    Procedure: Flexible bronchoscopy;  Surgeon: Samantha Bill DO;  Location: [facility] OR 2ND FLR;  Service: Endoscopy;  Laterality: N/A;    CHEST TUBE INSERTION      CHOLECYSTECTOMY      COLONOSCOPY      ESOPHAGOGASTRODUODENOSCOPY N/A 8/6/2018    Procedure: EGD (ESOPHAGOGASTRODUODENOSCOPY);  Surgeon: Ramu Eisenberg MD;  Location: [facility] ENDO (2ND FLR);  Service: Endoscopy;  Laterality: N/A;  off pepcid and all acid reducers for 2 weeks; PA > 50    FISTULOGRAM Left 1/10/2022    Procedure: Fistulogram;  Surgeon: CARLI Thomason II, MD;  Location: [facility] CATH LAB;  Service: Vascular;  Laterality: Left;    INSERTION OF TUNNELED CENTRAL VENOUS HEMODIALYSIS CATHETER N/A 3/13/2019    Procedure: INSERTION, CATHETER, CENTRAL VENOUS, HEMODIALYSIS, TUNNELED;  Surgeon: Edy Gonzalez MD;  Location: [facility] CATH LAB;  Service: Nephrology;  Laterality: N/A;    INSERTION OF TUNNELED CENTRAL VENOUS HEMODIALYSIS CATHETER Right 5/7/2021    Procedure: Insertion, Catheter, Central Venous, Hemodialysis;  Surgeon: Mary Joshi MD;  Location: [facility] CATH LAB;  Service: Interventional Nephrology;  Laterality: Right;    LUNG BIOPSY      LUNG TRANSPLANT  08/2017    PERCUTANEOUS TRANSLUMINAL ANGIOPLASTY OF ARTERIOVENOUS FISTULA N/A 1/10/2022    Procedure: PTA, AV FISTULA;  Surgeon: CARLI Thomason II, MD;  Location: [facility] CATH LAB;  Service:  Vascular;  Laterality: N/A;    REMOVAL OF TUNNELED CENTRAL VENOUS CATHETER (CVC) N/A 5/16/2019    Procedure: REMOVAL, CATHETER, CENTRAL VENOUS, TUNNELED;  Surgeon: Edy Gonzalez MD;  Location: [facility] CATH LAB;  Service: Nephrology;  Laterality: N/A;    REVISION OF ARTERIOVENOUS FISTULA Left 11/18/2021    Procedure: REVISION, AV FISTULA;  Surgeon: CARLI Thomason II, MD;  Location: [facility] OR [facility] FLR;  Service: Vascular;  Laterality: Left;  Ligation of side branches    TONSILLECTOMY         Review of patient's allergies indicates:  No Known Allergies  Current Facility-Administered Medications   Medication Frequency    albuterol inhaler 2 puff Q6H    amLODIPine tablet 10 mg Daily    ascorbic acid (vitamin C) tablet 500 mg BID    atorvastatin tablet 40 mg Daily    carvediloL tablet 3.125 mg BID    cloNIDine tablet 0.1 mg TID    dexAMETHasone tablet 6 mg Daily    dextrose 10% bolus 125 mL PRN    dextrose 10% bolus 250 mL PRN    dextrose 50% injection 12.5 g PRN    dextrose 50% injection 25 g PRN    everolimus (immunosuppressive) tablet 0.75 mg Q12H    folic acid tablet 1,000 mcg Daily    heparin 25,000 units in dextrose 5% (100 units/ml) IV bolus from bag - ADDITIONAL PRN BOLUS - 30 units/kg PRN    heparin 25,000 units in dextrose 5% (100 units/ml) IV bolus from bag - ADDITIONAL PRN BOLUS - 60 units/kg PRN    heparin 25,000 units in dextrose 5% 250 mL (100 units/mL) infusion LOW INTENSITY nomogram Anti- Xa Continuous    insulin aspart U-100 pen 0-5 Units QID (AC + HS) PRN    insulin regular in 0.9 % NaCl 100 unit/100 mL (1 unit/mL) infusion Continuous    multivitamin tablet Daily    mupirocin 2 % ointment BID    naloxone 0.4 mg/mL injection 0.02 mg PRN    pantoprazole EC tablet 40 mg Daily    [START ON 2/14/2022] remdesivir 100 mg in sodium chloride 0.9% 250 mL infusion Daily    sevelamer carbonate tablet 800 mg TID WM    sodium bicarbonate tablet 650 mg BID    sodium chloride 0.9% flush  10 mL Q12H PRN    sodium chloride 0.9% flush 10 mL PRN    tacrolimus capsule 3 mg BID    valsartan tablet 160 mg BID     Facility-Administered Medications Ordered in Other Encounters   Medication Frequency    0.9%  NaCl infusion Continuous    cefazolin (ANCEF) 2 gram in dextrose 5% 50 mL IVPB (premix) On Call Procedure     Family History     Problem Relation (Age of Onset)    Blindness Father    Diabetes Father, Brother    Hypertension Mother    Kidney disease Sister        Tobacco Use    Smoking status: Never Smoker    Smokeless tobacco: Never Used   Substance and Sexual Activity    Alcohol use: No    Drug use: No    Sexual activity: Not on file     Review of Systems   Constitutional: Negative for activity change, appetite change, fatigue and fever.   HENT: Negative for congestion and facial swelling.    Respiratory: Positive for shortness of breath. Negative for cough, wheezing and stridor.    Cardiovascular: Negative for chest pain, palpitations and leg swelling.   Gastrointestinal: Negative for abdominal pain, diarrhea, nausea and vomiting.   Endocrine: Negative.    Genitourinary: Negative for dysuria and hematuria.   Musculoskeletal: Negative.    Skin: Negative.    Allergic/Immunologic: Positive for immunocompromised state.   Neurological: Negative.      Objective:     Vital Signs (Most Recent):  Temp: 97.8 °F (36.6 °C) (02/13/22 1124)  Pulse: 97 (02/13/22 1359)  Resp: (!) 22 (02/13/22 1359)  BP: 107/67 (02/13/22 1124)  SpO2: 96 % (02/13/22 1359)  O2 Device (Oxygen Therapy): nasal cannula (02/13/22 1359) Vital Signs (24h Range):  Temp:  [97.4 °F (36.3 °C)-97.8 °F (36.6 °C)] 97.8 °F (36.6 °C)  Pulse:  [76-97] 97  Resp:  [18-22] 22  SpO2:  [94 %-97 %] 96 %  BP: (106-138)/(67-85) 107/67     Weight: 94.7 kg (208 lb 12.4 oz) (02/13/22 0002)  Body mass index is 29.12 kg/m².  Body surface area is 2.18 meters squared.    No intake/output data recorded.    Physical Exam  Vitals reviewed.   Constitutional:        General: He is not in acute distress.     Appearance: He is well-developed and well-nourished.   HENT:      Head: Normocephalic and atraumatic.      Mouth/Throat:      Mouth: Oropharynx is clear and moist.   Eyes:      Pupils: Pupils are equal, round, and reactive to light.   Neck:      Vascular: No JVD.   Cardiovascular:      Rate and Rhythm: Normal rate and regular rhythm.      Heart sounds: Normal heart sounds. No murmur heard.      Pulmonary:      Effort: Pulmonary effort is normal. No respiratory distress.      Breath sounds: Normal breath sounds. No wheezing or rales.   Abdominal:      General: Bowel sounds are normal. There is no distension.      Palpations: Abdomen is soft.      Tenderness: There is no abdominal tenderness.   Musculoskeletal:         General: No deformity or edema. Normal range of motion.      Cervical back: Normal range of motion and neck supple.      Comments: LUE brachiocephalic AVF with good thrill and bruit.   Skin:     General: Skin is warm.   Neurological:      Mental Status: He is alert and oriented to person, place, and time.         Significant Labs:  BMP:   Recent Labs   Lab 02/13/22 0243 02/13/22 0243 02/13/22  1219   *   < > 371*   *   < > 133*   K 4.4   < > 4.7   CL 95   < > 93*   CO2 21*   < > 19*   BUN 72*   < > 90*   CREATININE 10.6*   < > 12.0*   CALCIUM 9.2   < > 9.1   MG 2.1  --   --     < > = values in this interval not displayed.     CBC:   Recent Labs   Lab 02/13/22  0626   WBC 7.55   RBC 3.34*   HGB 9.9*   HCT 32.7*      MCV 98   MCH 29.6   MCHC 30.3*     CMP:   Recent Labs   Lab 02/13/22  0243 02/13/22  0243 02/13/22  1219   *   < > 371*   CALCIUM 9.2   < > 9.1   ALBUMIN 2.8*  --   --    PROT 6.6  --   --    *   < > 133*   K 4.4   < > 4.7   CO2 21*   < > 19*   CL 95   < > 93*   BUN 72*   < > 90*   CREATININE 10.6*   < > 12.0*   ALKPHOS 56  --   --    ALT 15  --   --    AST 22  --   --    BILITOT 0.4  --   --     < > = values in  this interval not displayed.     All labs within the past 24 hours have been reviewed.    Significant Imaging:  Labs: Reviewed  X-Ray: Reviewed    Assessment/Plan:     * COVID-19  Per primary.    ESRD (end stage renal disease)  ESRD on IHD  TTS  -  Last iHD 2/12 per patient  Out patient HD [facility] - [facility]/ Dr. Zenia Peacock.  Duration of outpatient dialysis session - 4 hrs  EDW: per patient was 98.5-99 kg but might be lower he states as he has lost weight.  Residual Renal Function (Urine Output) - reports ~250-400 cc per day  Access: LUE brachiocephalic fistula, created 10/13/2021 by Dr Thomason    End-Stage Renal Disease on HD  - Will provide dialysis for metabolic clearance and volume management per his schedule. No need for emergent HD today  - Target ultrafiltration:  ~2L  - Dialysate adjusted to current labs   - Avoid nephrotoxic medications  - Medications to renal parameters  - Strict Input and Output and chart  - Daily standing weights    Active Problem in Hospital: COVID pneumonia    Anemia of Chronic Kidney Disease   - Hb > 7 gm/dL  - Will resume ROGELIO as outpatient   - Adequate iron stores as per most recent profile   - Will request iron studies to assess further needs of supplementation     Mineral Bone Disease in CKD   - Renal Function Panel Daily for electrolytes monitoring  - Phos 7.9   - Already on binders as outpatient- Please increase Sevelamer to 1600 mg PO TID w/meals  - CoCa     HTN   - BP, will continue to monitor. Goal for BP is <140 mmHg SBP and BDP <90 mmHg, maintain MAP > 65.    Nutrition   - Renal Diet    Case discussed with staff further recs with attestation.            Lung replaced by transplant  On immunosupression  Per primary        Thank you for your consult. I will follow-up with patient. Please contact us if you have any additional questions.    Eddie Mathews MD  Nephrology  [facility] ([facility]

## 2022-02-13 NOTE — SUBJECTIVE & OBJECTIVE
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 Peacock    DM (diabetes mellitus) 11/2017     02/22/2021 Insulin x 2017    Hypertension     On home oxygen therapy     KRISTYN on CPAP     Osteopenia     Pancreatitis 2009    hospitalized    Pulmonary hypertension     Pure hypercholesterolemia 11/15/2017    Sarcoidosis     Shortness of breath     Type 2 diabetes mellitus 11/5/2017       Past Surgical History:   Procedure Laterality Date    ABDOMINAL SURGERY      6 weeks old    AV FISTULA PLACEMENT Left 10/13/2021    Procedure: CREATION, AV FISTULA;  Surgeon: CARLI Thomason II, MD;  Location: [facility] OR 2ND FLR;  Service: Vascular;  Laterality: Left;    BRONCHOSCOPY      BRONCHOSCOPY N/A 8/22/2018    Procedure: flexible bronchoscopy with tissue biopsy CPT 44814;  Surgeon: [facility] Diagnostic Provider;  [facility];  Service: Endoscopy;  Laterality: N/A;    BRONCHOSCOPY N/A 11/20/2018    Procedure: flexible bronchoscopy with tissue biopy CPT 49098;  Surgeon: [facility] Diagnostic Provider;  Location: [facility] OR [facility] FLR;  Service: Anesthesiology;  Laterality: N/A;    BRONCHOSCOPY N/A 11/10/2021    Procedure: Flexible bronchoscopy;  Surgeon: Samantha Bill DO;  [facility];  Service: Endoscopy;  Laterality: N/A;    CHEST TUBE INSERTION      CHOLECYSTECTOMY      COLONOSCOPY      ESOPHAGOGASTRODUODENOSCOPY N/A 8/6/2018    Procedure: EGD (ESOPHAGOGASTRODUODENOSCOPY);  Surgeon: Ramu Eisenberg MD;  Location: [facility] ENDO (2ND FLR);  Service: Endoscopy;  Laterality: N/A;  off pepcid and all acid reducers for 2 weeks; PA > 50    FISTULOGRAM Left 1/10/2022    Procedure: Fistulogram;  Surgeon: CARLI Thomason II, MD;  Location: [facility] CATH LAB;  Service: Vascular;  Laterality: Left;    INSERTION OF TUNNELED  CENTRAL VENOUS HEMODIALYSIS CATHETER N/A 3/13/2019    Procedure: INSERTION, CATHETER, CENTRAL VENOUS, HEMODIALYSIS, TUNNELED;  Surgeon: Edy Gonzalez MD;  Location: [facility] CATH LAB;  Service: Nephrology;  Laterality: N/A;    INSERTION OF TUNNELED CENTRAL VENOUS HEMODIALYSIS CATHETER Right 5/7/2021    Procedure: Insertion, Catheter, Central Venous, Hemodialysis;  Surgeon: Mary Joshi MD;  Location: [facility] CATH LAB;  Service: Interventional Nephrology;  Laterality: Right;    LUNG BIOPSY      LUNG TRANSPLANT  08/2017    PERCUTANEOUS TRANSLUMINAL ANGIOPLASTY OF ARTERIOVENOUS FISTULA N/A 1/10/2022    Procedure: PTA, AV FISTULA;  Surgeon: CARLI Thomason II, MD;  Location: [facility] CATH LAB;  Service: Vascular;  Laterality: N/A;    REMOVAL OF TUNNELED CENTRAL VENOUS CATHETER (CVC) N/A 5/16/2019    Procedure: REMOVAL, CATHETER, CENTRAL VENOUS, TUNNELED;  Surgeon: Edy Gnozalez MD;  Location: [facility] CATH LAB;  Service: Nephrology;  Laterality: N/A;    REVISION OF ARTERIOVENOUS FISTULA Left 11/18/2021    Procedure: REVISION, AV FISTULA;  Surgeon: CARLI Thomason II, MD;  Location: [facility] OR [facility] FLR;  Service: Vascular;  Laterality: Left;  Ligation of side branches    TONSILLECTOMY         Review of patient's allergies indicates:  No Known Allergies  Current Facility-Administered Medications   Medication Frequency    albuterol inhaler 2 puff Q6H    amLODIPine tablet 10 mg Daily    ascorbic acid (vitamin C) tablet 500 mg BID    atorvastatin tablet 40 mg Daily    carvediloL tablet 3.125 mg BID    cloNIDine tablet 0.1 mg TID    dexAMETHasone tablet 6 mg Daily    dextrose 10% bolus 125 mL PRN    dextrose 10% bolus 250 mL PRN    dextrose 50% injection 12.5 g PRN    dextrose 50% injection 25 g PRN    everolimus (immunosuppressive) tablet 0.75 mg Q12H    folic acid tablet 1,000 mcg Daily    heparin 25,000 units in dextrose 5% (100 units/ml) IV bolus from bag - ADDITIONAL PRN BOLUS - 30 units/kg PRN     heparin 25,000 units in dextrose 5% (100 units/ml) IV bolus from bag - ADDITIONAL PRN BOLUS - 60 units/kg PRN    heparin 25,000 units in dextrose 5% 250 mL (100 units/mL) infusion LOW INTENSITY nomogram Anti- Xa Continuous    insulin aspart U-100 pen 0-5 Units QID (AC + HS) PRN    insulin regular in 0.9 % NaCl 100 unit/100 mL (1 unit/mL) infusion Continuous    multivitamin tablet Daily    mupirocin 2 % ointment BID    naloxone 0.4 mg/mL injection 0.02 mg PRN    pantoprazole EC tablet 40 mg Daily    [START ON 2/14/2022] remdesivir 100 mg in sodium chloride 0.9% 250 mL infusion Daily    sevelamer carbonate tablet 800 mg TID WM    sodium bicarbonate tablet 650 mg BID    sodium chloride 0.9% flush 10 mL Q12H PRN    sodium chloride 0.9% flush 10 mL PRN    tacrolimus capsule 3 mg BID    valsartan tablet 160 mg BID     Facility-Administered Medications Ordered in Other Encounters   Medication Frequency    0.9%  NaCl infusion Continuous    cefazolin (ANCEF) 2 gram in dextrose 5% 50 mL IVPB (premix) On Call Procedure     Family History     Problem Relation (Age of Onset)    Blindness Father    Diabetes Father, Brother    Hypertension Mother    Kidney disease Sister        Tobacco Use    Smoking status: Never Smoker    Smokeless tobacco: Never Used   Substance and Sexual Activity    Alcohol use: No    Drug use: No    Sexual activity: Not on file     Review of Systems   Constitutional: Negative for activity change, appetite change, fatigue and fever.   HENT: Negative for congestion and facial swelling.    Respiratory: Positive for shortness of breath. Negative for cough, wheezing and stridor.    Cardiovascular: Negative for chest pain, palpitations and leg swelling.   Gastrointestinal: Negative for abdominal pain, diarrhea, nausea and vomiting.   Endocrine: Negative.    Genitourinary: Negative for dysuria and hematuria.   Musculoskeletal: Negative.    Skin: Negative.    Allergic/Immunologic: Positive for  immunocompromised state.   Neurological: Negative.      Objective:     Vital Signs (Most Recent):  Temp: 97.8 °F (36.6 °C) (02/13/22 1124)  Pulse: 97 (02/13/22 1359)  Resp: (!) 22 (02/13/22 1359)  BP: 107/67 (02/13/22 1124)  SpO2: 96 % (02/13/22 1359)  O2 Device (Oxygen Therapy): nasal cannula (02/13/22 1359) Vital Signs (24h Range):  Temp:  [97.4 °F (36.3 °C)-97.8 °F (36.6 °C)] 97.8 °F (36.6 °C)  Pulse:  [76-97] 97  Resp:  [18-22] 22  SpO2:  [94 %-97 %] 96 %  BP: (106-138)/(67-85) 107/67     Weight: 94.7 kg (208 lb 12.4 oz) (02/13/22 0002)  Body mass index is 29.12 kg/m².  Body surface area is 2.18 meters squared.    No intake/output data recorded.    Physical Exam  Vitals reviewed.   Constitutional:       General: He is not in acute distress.     Appearance: He is well-developed and well-nourished.   HENT:      Head: Normocephalic and atraumatic.      Mouth/Throat:      Mouth: Oropharynx is clear and moist.   Eyes:      Pupils: Pupils are equal, round, and reactive to light.   Neck:      Vascular: No JVD.   Cardiovascular:      Rate and Rhythm: Normal rate and regular rhythm.      Heart sounds: Normal heart sounds. No murmur heard.      Pulmonary:      Effort: Pulmonary effort is normal. No respiratory distress.      Breath sounds: Normal breath sounds. No wheezing or rales.   Abdominal:      General: Bowel sounds are normal. There is no distension.      Palpations: Abdomen is soft.      Tenderness: There is no abdominal tenderness.   Musculoskeletal:         General: No deformity or edema. Normal range of motion.      Cervical back: Normal range of motion and neck supple.      Comments: LUE brachiocephalic AVF with good thrill and bruit.   Skin:     General: Skin is warm.   Neurological:      Mental Status: He is alert and oriented to person, place, and time.         Significant Labs:  BMP:   Recent Labs   Lab 02/13/22  0243 02/13/22  0243 02/13/22  1219   *   < > 371*   *   < > 133*   K 4.4   < >  4.7   CL 95   < > 93*   CO2 21*   < > 19*   BUN 72*   < > 90*   CREATININE 10.6*   < > 12.0*   CALCIUM 9.2   < > 9.1   MG 2.1  --   --     < > = values in this interval not displayed.     CBC:   Recent Labs   Lab 02/13/22  0626   WBC 7.55   RBC 3.34*   HGB 9.9*   HCT 32.7*      MCV 98   MCH 29.6   MCHC 30.3*     CMP:   Recent Labs   Lab 02/13/22  0243 02/13/22  0243 02/13/22  1219   *   < > 371*   CALCIUM 9.2   < > 9.1   ALBUMIN 2.8*  --   --    PROT 6.6  --   --    *   < > 133*   K 4.4   < > 4.7   CO2 21*   < > 19*   CL 95   < > 93*   BUN 72*   < > 90*   CREATININE 10.6*   < > 12.0*   ALKPHOS 56  --   --    ALT 15  --   --    AST 22  --   --    BILITOT 0.4  --   --     < > = values in this interval not displayed.     All labs within the past 24 hours have been reviewed.    Significant Imaging:  Labs: Reviewed  X-Ray: Reviewed

## 2022-02-13 NOTE — CONSULTS
[facility] ([place] [address])  Lung Transplant  Consult Note    Patient Name: Martin Hendrix Jr.  MRN: [id number]  Admission Date: 2/12/2022  Hospital Length of Stay: 1 days  Attending Physician: Mike Cordoba MD  Primary Care Provider: Michel Henley MD     Inpatient consult to Lung Transplant  Consult performed by: Karen Alexander MD  Consult ordered by: Sophie Jones MD        Subjective:     History of Present Illness:  Mr. 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  He was transferred from [place] for management of acute on chronic hypoxemic respiratory failure 2/2 COVID-19 pneumonia.     The patient typical uses supplemental oxygen prn with exertion but had noted that he was requiring escalating doses of oxygen even at rest.  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 Peacock    DM (diabetes mellitus) 11/2017     02/22/2021 Insulin x 2017    Hypertension     On home oxygen therapy     KRISTYN on CPAP     Osteopenia     Pancreatitis 2009    hospitalized    Pulmonary hypertension     Pure hypercholesterolemia 11/15/2017    Sarcoidosis     Shortness of breath     Type 2 diabetes mellitus 11/5/2017       Past Surgical History:   Procedure Laterality Date     ABDOMINAL SURGERY      6 weeks old    AV FISTULA PLACEMENT Left 10/13/2021    Procedure: CREATION, AV FISTULA;  Surgeon: CARLI Thomason II, MD;  Location: [facility] OR [facility] FLR;  Service: Vascular;  Laterality: Left;    BRONCHOSCOPY      BRONCHOSCOPY N/A 8/22/2018    Procedure: flexible bronchoscopy with tissue biopsy CPT 84580;  Surgeon: [facility] Diagnostic Provider;  Location: [facility] OR 2ND FLR;  Service: Endoscopy;  Laterality: N/A;    BRONCHOSCOPY N/A 11/20/2018    Procedure: flexible bronchoscopy with tissue biopy CPT 33580;  Surgeon: [facility] Diagnostic Provider;  Location: [facility] OR 2ND FLR;  Service: Anesthesiology;  Laterality: N/A;    BRONCHOSCOPY N/A 11/10/2021    Procedure: Flexible bronchoscopy;  Surgeon: Samantha Bill DO;  Location: [facility] OR 2ND FLR;  Service: Endoscopy;  Laterality: N/A;    CHEST TUBE INSERTION      CHOLECYSTECTOMY      COLONOSCOPY      ESOPHAGOGASTRODUODENOSCOPY N/A 8/6/2018    Procedure: EGD (ESOPHAGOGASTRODUODENOSCOPY);  Surgeon: Ramu Eisenberg MD;  Location: [facility] ENDO (2ND FLR);  Service: Endoscopy;  Laterality: N/A;  off pepcid and all acid reducers for 2 weeks; PA > 50    FISTULOGRAM Left 1/10/2022    Procedure: Fistulogram;  Surgeon: CARLI Thomason II, MD;  Location: [facility] CATH LAB;  Service: Vascular;  Laterality: Left;    INSERTION OF TUNNELED CENTRAL VENOUS HEMODIALYSIS CATHETER N/A 3/13/2019    Procedure: INSERTION, CATHETER, CENTRAL VENOUS, HEMODIALYSIS, TUNNELED;  Surgeon: Edy Gonzalez MD;  Location: [facility] CATH LAB;  Service: Nephrology;  Laterality: N/A;    INSERTION OF TUNNELED CENTRAL VENOUS HEMODIALYSIS CATHETER Right 5/7/2021    Procedure: Insertion, Catheter, Central Venous, Hemodialysis;  Surgeon: Mary Joshi MD;  Location: [facility] CATH LAB;  Service: Interventional Nephrology;  Laterality: Right;    LUNG BIOPSY      LUNG TRANSPLANT  08/2017    PERCUTANEOUS TRANSLUMINAL ANGIOPLASTY OF ARTERIOVENOUS FISTULA N/A 1/10/2022    Procedure: PTA, AV  FISTULA;  Surgeon: CARLI Thomason II, MD;  Location: [facility] CATH LAB;  Service: Vascular;  Laterality: N/A;    REMOVAL OF TUNNELED CENTRAL VENOUS CATHETER (CVC) N/A 5/16/2019    Procedure: REMOVAL, CATHETER, CENTRAL VENOUS, TUNNELED;  Surgeon: Edy Gonzalez MD;  Location: [facility] CATH LAB;  Service: Nephrology;  Laterality: N/A;    REVISION OF ARTERIOVENOUS FISTULA Left 11/18/2021    Procedure: REVISION, AV FISTULA;  Surgeon: CARLI Thomason II, MD;  Location: [facility] OR [facility] FLR;  Service: Vascular;  Laterality: Left;  Ligation of side branches    TONSILLECTOMY         Review of patient's allergies indicates:  No Known Allergies    PTA Medications   Medication Sig    amLODIPine (NORVASC) 10 MG tablet TAKE 1 TABLET BY MOUTH EVERY DAY    azithromycin (Z-REYNA) 250 MG tablet Take 1 tablet (250 mg total) by mouth every Mon, Wed, Fri.    carvediloL (COREG) 3.125 MG tablet One po BID, hold if SBP < 130    cholecalciferol, vitamin D3, (VITAMIN D3) 25 mcg (1,000 unit) capsule Take 2 capsules (2,000 Units total) by mouth once daily.    cinacalcet (SENSIPAR) 30 MG Tab One po QDAY, with largest meal    cloNIDine (CATAPRES) 0.1 MG tablet Take 1 tablet (0.1 mg total) by mouth 3 (three) times daily.    ECOTRIN LOW STRENGTH 81 mg EC tablet TAKE 1 TABLET DAILY    epoetin greer-epbx (RETACRIT) 4,000 unit/mL injection Inject 1.41 mLs (5,640 Units total) into the skin every Tues, Thurs, Sat. Administered by dialysis unit on T/Th/Sat.    everolimus, immunosuppressive, (ZORTRESS) 0.75 mg tablet Take 1 tablet (0.75 mg total) by mouth every 12 (twelve) hours.    folic acid (FOLVITE) 1 MG tablet TAKE 1 TABLET DAILY    furosemide (LASIX) 40 MG tablet TAKE 1 TABLET BY MOUTH EVERY DAY AS NEEDED FOR LEG SWELLING    insulin aspart U-100 (NOVOLOG FLEXPEN U-100 INSULIN) 100 unit/mL (3 mL) InPn pen Inject 10 Units into the skin 3 (three) times daily with meals.    insulin aspart U-100 (NOVOLOG) 100 unit/mL (3 mL) InPn pen Inject  "0-5 Units into the skin before meals and at bedtime as needed (Hyperglycemia). Use in addition to 10 units TID with meals    insulin degludec (TRESIBA FLEXTOUCH U-200) 200 unit/mL (3 mL) insulin pen Inject 20 Units into the skin every evening. (Patient taking differently: Inject 26 Units into the skin every evening.)    lancets Misc To check BG 4 times daily, to use with insurance preferred meter    magnesium chloride (MAG 64) 64 mg TbEC Take 2 tablets (128 mg total) by mouth 2 (two) times daily.    ondansetron (ZOFRAN-ODT) 8 MG TbDL Dissolve 1 tablet (8 mg total) by mouth daily as needed (pre-med for IVIG infusions).    ONETOUCH VERIO Strp USE TO CHECK BLOOD GLUCOSE FOUR TIMES A DAY, TO USE WITH INSURANCE PREFERRED METER    pantoprazole (PROTONIX) 40 MG tablet Take 1 tablet (40 mg total) by mouth once daily.    pen needle, diabetic (BD ULTRA-FINE JIM PEN NEEDLE) 32 gauge x 5/32" Ndle Uses 4 times daily, on multiple daily insulin injections    predniSONE (DELTASONE) 5 MG tablet Take 1 tablet (5 mg total) by mouth once daily.    rosuvastatin (CRESTOR) 10 MG tablet Take 1 tablet (10 mg total) by mouth once daily.    sevelamer carbonate (RENVELA) 800 mg Tab TAKE 2 TABLETS BY MOUTH 3 TIMES A DAY WITH MEALS.    sodium bicarbonate 650 MG tablet Two in am and two in pm    sulfamethoxazole-trimethoprim 400-80mg (BACTRIM,SEPTRA) 400-80 mg per tablet Take 1 tablet by mouth every Mon, Wed, Fri.    tacrolimus (PROGRAF) 0.5 MG Cap Take 6 capsules (3 mg total) by mouth every 12 (twelve) hours.    valsartan (DIOVAN) 160 MG tablet Take 1 tablet (160 mg total) by mouth 2 (two) times daily.     Family History     Problem Relation (Age of Onset)    Blindness Father    Diabetes Father, Brother    Hypertension Mother    Kidney disease Sister        Tobacco Use    Smoking status: Never Smoker    Smokeless tobacco: Never Used   Substance and Sexual Activity    Alcohol use: No    Drug use: No    Sexual activity: Not on " file     Review of Systems   Constitutional: Positive for fatigue. Negative for fever.   HENT: Negative.    Eyes: Negative.    Respiratory: Positive for cough and shortness of breath.    Cardiovascular: Negative.  Negative for chest pain and leg swelling.   Gastrointestinal: Positive for diarrhea.   Endocrine: Negative.    Genitourinary: Negative.    Musculoskeletal: Negative.  Negative for arthralgias.   Skin: Negative.    Allergic/Immunologic: Negative.    Neurological: Negative.    Hematological: Negative.    Psychiatric/Behavioral: Negative.  The patient is not nervous/anxious.      Objective:     Vital Signs (Most Recent):  Temp: 97.7 °F (36.5 °C) (02/13/22 1526)  Pulse: 89 (02/13/22 1427)  Resp: 18 (02/13/22 1427)  BP: 115/72 (02/13/22 1427)  SpO2: 95 % (02/13/22 1427) Vital Signs (24h Range):  Temp:  [97.4 °F (36.3 °C)-97.8 °F (36.6 °C)] 97.7 °F (36.5 °C)  Pulse:  [76-97] 89  Resp:  [18-22] 18  SpO2:  [94 %-97 %] 95 %  BP: (107-138)/(67-85) 115/72     Weight: 94.7 kg (208 lb 12.4 oz)  Body mass index is 29.12 kg/m².      Intake/Output Summary (Last 24 hours) at 2/13/2022 1721  Last data filed at 2/13/2022 1611  Gross per 24 hour   Intake 618.88 ml   Output 100 ml   Net 518.88 ml       Physical Exam  Vitals reviewed.   At the time of exam patient was on room air with O2 Sats in the low 90's.  Constitutional:       Appearance: Normal appearance. He is well-developed and well-nourished.   HENT:      Head: Normocephalic and atraumatic.   Eyes:      Pupils: Pupils are equal, round, and reactive to light.   Neck:      Trachea: No tracheal deviation.   Cardiovascular:      Rate and Rhythm: Normal rate and regular rhythm.      Heart sounds: No murmur heard.      Pulmonary:      Effort: Pulmonary effort is normal.      Breath sounds: Rales (at right base) present. No wheezing.   Abdominal:      General: Bowel sounds are normal. There is no distension.      Palpations: Abdomen is soft.      Tenderness: There is no  abdominal tenderness.   Musculoskeletal:         General: No swelling or edema.      Cervical back: Normal range of motion.   Skin:     General: Skin is warm and dry.      Capillary Refill: Capillary refill takes less than 2 seconds.   Neurological:      General: No focal deficit present.      Mental Status: He is alert and oriented to person, place, and time.   Psychiatric:         Mood and Affect: Mood and affect and mood normal.         Behavior: Behavior normal.         Significant Labs:  CBC:  Recent Labs   Lab 02/13/22  0626   WBC 7.55   RBC 3.34*   HGB 9.9*   HCT 32.7*      MCV 98   MCH 29.6   MCHC 30.3*     BMP:  Recent Labs   Lab 02/13/22  1219   *   K 4.7   CL 93*   CO2 19*   BUN 90*   CREATININE 12.0*   CALCIUM 9.1        I have reviewed all pertinent labs within the past 24 hours.    Diagnostic Results:  CT Chest reviewed. Basilar ground glass opacities consistent with covid.    Assessment/Plan:     * COVID-19  Agree with current plan:  Remdesivir x 5 days.  Dexamethasone x 10 days.  Supplemental oxygen to maintain an O2 Sat>88%    ESRD (end stage renal disease)    Dialysis per nephro recommendations.    Type 2 diabetes mellitus with hyperglycemia, with long-term current use of insulin  Patient takes Tresiba 20 units qhs and Aspart 10units tid plus sliding scale at home.  Expect suboptimally controlled blood glucose with infection and addition of dexamethasone.  Beta-Hydroxy is WNL.  Reasonable to transition to long acting insulin tonight but will defer to primary team.  Goal blood glucose 140-180.      Lung replaced by transplant  Patient is prescribed tacrolimus 3 mg BID, everolimus 0.75 mg daily, prednisone 5 mg daily.   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    Tacrolimus levels are elevated.  Hold tacrolimus dose tomorrow and repeat level.  Continue everolimus  Discontinue prednisone while on dexamethasone.    Please resume when dexamethasone course completed.         Thank you for your  consult. I will sign off. Please contact us if you have any additional questions.    Karen Alexander MD  Lung Transplant  [facility] ([place])

## 2022-02-13 NOTE — HPI
Patient is 61-year-old with did lung transplant 2017 (cb acute rejection), KRISTYN on CPAP, pulmonary hypertension, sarcoidosis, T2DM, HLD, ESRD on HD TTS ( most recent February 9th), presented to [place] on the 11th with dyspnea worsening over past 2 weeks.  Patient reported oxygen saturation of 88% on room air, he is on home oxygen as needed during exertion however was using it more often and at rest.  Reports requiring higher level of oxygen up to 5 L even at rest, which is unusual for him.  Today, the patient reports improvements in symptoms however he continues to be short of breath and quickly becomes tachypneic with worsening oxygen sat on ambulation.    At the outside hospital emergency room patient was COVID positive, chest x-ray with mid and lower lung interstitial disease (improved from previous).  Patient was requiring 4 L of oxygen satting 95%, was started on COVID protocol with Rocephin, azithromycin, dexamethasone and weight based Lovenox.  Due to patient's history of  lung transplant decision was made to transfer to [facility] for transplant pulmonology evaluation. He was admitted to [facility] while awaiting a bed at [facility].  His labs were as follow:  COVID positive (February 11), INR 1.13, D-dimer 4226, BNP 97.1, troponin less than 0.03, CPK 93, sodium 130, potassium 5.4 (hemolyzed)-repeat potassium 4.8, chloride 91, CO2 18, , creatinine 19.07, glucose 125, AST 38, ALT 19, white blood cells 9.5, hemoglobin 11.3, hematocrit 33.7, platelets 284, lactic acid 0.9 ABG with pH 7.33/pCO2 34/PO2 80 (2 L nasal cannula) VS: Temperature 98.3°, pulse 120, blood pressure 160/90, oxygen saturation 99%.     On presentation to [facility] patient was on 2 L satting 95%, hemodynamically stable.  Admitted to Hospital Medicine for further care and management.

## 2022-02-13 NOTE — HPI
"62 yo M patient with PMHx of T2DM, HTN, HLP, KRISTYN on CPAP, ESRD on IHD TTS, sarcoidosis s/p Lung transplant in 2017 on immunosuppression and PRN home oxygen, who presented to OSH ED due to worsening SOB, found to be COVID positive and  transferred to this center due to lung transplant status.     Patient reports he has been having worsening SOB for the last 2 weeks. He did miss an entire week of HD due to "not feeling well". He then presented to the ED and was found to be hypoxic. Tested positive for COVID and was started on dexamethasone, remdesivir, CTX/AZT. He normally dialyzes TTS and reports he was last dialyzed yesterday. He still makes urine but reports not a lot. Reports chills, denies chest pain, abdominal pain or any other symptom.   "

## 2022-02-13 NOTE — ASSESSMENT & PLAN NOTE
ESRD on IHD  TTS  -  Last iHD 2/12 per patient  Out patient HD Center - [facility]/ Dr. Zenia Peacock.  Duration of outpatient dialysis session - 4 hrs  EDW: per patient was 98.5-99 kg but might be lower he states as he has lost weight.  Residual Renal Function (Urine Output) - reports ~250-400 cc per day  Access: LUE brachiocephalic fistula, created 10/13/2021 by Dr Thomason    End-Stage Renal Disease on HD  - Will provide dialysis for metabolic clearance and volume management per his schedule. No need for emergent HD today  - Target ultrafiltration:  ~2L  - Dialysate adjusted to current labs   - Avoid nephrotoxic medications  - Medications to renal parameters  - Strict Input and Output and chart  - Daily standing weights    Active Problem in Hospital: COVID pneumonia    Anemia of Chronic Kidney Disease   - Hb > 7 gm/dL  - Will resume ROGELIO as outpatient   - Adequate iron stores as per most recent profile   - Will request iron studies to assess further needs of supplementation     Mineral Bone Disease in CKD   - Renal Function Panel Daily for electrolytes monitoring  - Phos 7.9   - Already on binders as outpatient- Please increase Sevelamer to 1600 mg PO TID w/meals  - CoCa     HTN   - BP, will continue to monitor. Goal for BP is <140 mmHg SBP and BDP <90 mmHg, maintain MAP > 65.    Nutrition   - Renal Diet    Case discussed with staff further recs with attestation.

## 2022-02-13 NOTE — HOSPITAL COURSE
Patient admitted to Hosp Med and started on dexamethasone and remdesivir in setting of COVID. ID, Lung Transplant both consulted. Patient having hyperglycemia likely due to dexamethasone in setting of T2DM, insulin drip started, work up for DKA pending. Patient sating 94% on RA since admission. Insulin gtt stopped and transitioned to basal+prandial SQ insulin. Holding tacro for high serum level. Pulmonary transplant evaluated him and observed stability. Planning for discharge.

## 2022-02-13 NOTE — ASSESSMENT & PLAN NOTE
Patient takes Tresiba 20 units qhs and Aspart 10units tid plus sliding scale at home.  Expect suboptimally controlled blood glucose with infection and addition of dexamethasone.  Beta-Hydroxy is WNL.  Reasonable to transition to long acting insulin tonight but will defer to primary team.  Goal blood glucose 140-180.

## 2022-02-13 NOTE — SUBJECTIVE & OBJECTIVE
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 Peacock    DM (diabetes mellitus) 11/2017     02/22/2021 Insulin x 2017    Hypertension     On home oxygen therapy     KRISTYN on CPAP     Osteopenia     Pancreatitis 2009    hospitalized    Pulmonary hypertension     Pure hypercholesterolemia 11/15/2017    Sarcoidosis     Shortness of breath     Type 2 diabetes mellitus 11/5/2017       Past Surgical History:   Procedure Laterality Date    ABDOMINAL SURGERY      6 weeks old    AV FISTULA PLACEMENT Left 10/13/2021    Procedure: CREATION, AV FISTULA;  Surgeon: CARLI Thomason II, MD;  Location: [facility] OR 2ND FLR;  Service: Vascular;  Laterality: Left;    BRONCHOSCOPY      BRONCHOSCOPY N/A 8/22/2018    Procedure: flexible bronchoscopy with tissue biopsy CPT 77223;  Surgeon: [facility] Diagnostic Provider;  [facility];  Service: Endoscopy;  Laterality: N/A;    BRONCHOSCOPY N/A 11/20/2018    Procedure: flexible bronchoscopy with tissue biopy CPT 93053;  Surgeon: [facility] Diagnostic Provider;  Location: [facility] OR [facility] FLR;  Service: Anesthesiology;  Laterality: N/A;    BRONCHOSCOPY N/A 11/10/2021    Procedure: Flexible bronchoscopy;  Surgeon: Samantha Bill DO;  [facility];  Service: Endoscopy;  Laterality: N/A;    CHEST TUBE INSERTION      CHOLECYSTECTOMY      COLONOSCOPY      ESOPHAGOGASTRODUODENOSCOPY N/A 8/6/2018    Procedure: EGD (ESOPHAGOGASTRODUODENOSCOPY);  Surgeon: Ramu Eisenberg MD;  Location: [facility] ENDO (2ND FLR);  Service: Endoscopy;  Laterality: N/A;  off pepcid and all acid reducers for 2 weeks; PA > 50    FISTULOGRAM Left 1/10/2022    Procedure: Fistulogram;  Surgeon: CARLI Thomason II, MD;  Location: [facility] CATH LAB;  Service: Vascular;  Laterality: Left;    INSERTION OF TUNNELED  CENTRAL VENOUS HEMODIALYSIS CATHETER N/A 3/13/2019    Procedure: INSERTION, CATHETER, CENTRAL VENOUS, HEMODIALYSIS, TUNNELED;  Surgeon: Edy Gonzalez MD;  Location: [facility] CATH LAB;  Service: Nephrology;  Laterality: N/A;    INSERTION OF TUNNELED CENTRAL VENOUS HEMODIALYSIS CATHETER Right 5/7/2021    Procedure: Insertion, Catheter, Central Venous, Hemodialysis;  Surgeon: Mary Joshi MD;  Location: [facility] CATH LAB;  Service: Interventional Nephrology;  Laterality: Right;    LUNG BIOPSY      LUNG TRANSPLANT  08/2017    PERCUTANEOUS TRANSLUMINAL ANGIOPLASTY OF ARTERIOVENOUS FISTULA N/A 1/10/2022    Procedure: PTA, AV FISTULA;  Surgeon: CARLI Thomason II, MD;  Location: [facility] CATH LAB;  Service: Vascular;  Laterality: N/A;    REMOVAL OF TUNNELED CENTRAL VENOUS CATHETER (CVC) N/A 5/16/2019    Procedure: REMOVAL, CATHETER, CENTRAL VENOUS, TUNNELED;  Surgeon: Edy Gonzalez MD;  Location: [facility] CATH LAB;  Service: Nephrology;  Laterality: N/A;    REVISION OF ARTERIOVENOUS FISTULA Left 11/18/2021    Procedure: REVISION, AV FISTULA;  Surgeon: CARLI Thomason II, MD;  Location: [facility] OR [facility] FLR;  Service: Vascular;  Laterality: Left;  Ligation of side branches    TONSILLECTOMY         Review of patient's allergies indicates:  No Known Allergies    Current Facility-Administered Medications on File Prior to Encounter   Medication    0.9%  NaCl infusion    cefazolin (ANCEF) 2 gram in dextrose 5% 50 mL IVPB (premix)    [DISCONTINUED] acetaminophen tablet    [DISCONTINUED] albuterol-ipratropium 2.5 mg-0.5 mg/3 mL nebulizer solution    [DISCONTINUED] aluminum & magnesium hydroxide-simethicone 400-400-40 mg/5 mL suspension    [DISCONTINUED] amLODIPine tablet    [DISCONTINUED] aspirin EC tablet    [DISCONTINUED] benzonatate capsule    [DISCONTINUED] carvediloL tablet    [DISCONTINUED] cinacalcet tablet    [DISCONTINUED] cloNIDine tablet    [DISCONTINUED] epoetin greer injection    [DISCONTINUED]  everolimus (immunosuppressive) tablet    [DISCONTINUED] famotidine tablet    [DISCONTINUED] fluticasone propionate 44 mcg/actuation inhaler    [DISCONTINUED] folic acid tablet    [DISCONTINUED] GENERIC EXTERNAL MEDICATION    [DISCONTINUED] heparin (porcine) injection    [DISCONTINUED] insulin lispro injection    [DISCONTINUED] insulin lispro injection    [DISCONTINUED] lactulose 10 gram/15 mL solution    [DISCONTINUED] loperamide capsule    [DISCONTINUED] magnesium chloride TBEC tablet TbEC    [DISCONTINUED] methotrexate tablet    [DISCONTINUED] ondansetron injection    [DISCONTINUED] pantoprazole EC tablet    [DISCONTINUED] promethazine suppository    [DISCONTINUED] rosuvastatin tablet    [DISCONTINUED] sevelamer carbonate tablet    [DISCONTINUED] sodium bicarbonate tablet    [DISCONTINUED] tacrolimus capsule    [DISCONTINUED] temazepam capsule    [DISCONTINUED] valsartan tablet     Current Outpatient Medications on File Prior to Encounter   Medication Sig    amLODIPine (NORVASC) 10 MG tablet TAKE 1 TABLET BY MOUTH EVERY DAY    azithromycin (Z-REYNA) 250 MG tablet Take 1 tablet (250 mg total) by mouth every Mon, Wed, Fri.    carvediloL (COREG) 3.125 MG tablet One po BID, hold if SBP < 130    cholecalciferol, vitamin D3, (VITAMIN D3) 25 mcg (1,000 unit) capsule Take 2 capsules (2,000 Units total) by mouth once daily.    cinacalcet (SENSIPAR) 30 MG Tab One po QDAY, with largest meal    cloNIDine (CATAPRES) 0.1 MG tablet Take 1 tablet (0.1 mg total) by mouth 3 (three) times daily.    ECOTRIN LOW STRENGTH 81 mg EC tablet TAKE 1 TABLET DAILY    epoetin greer-epbx (RETACRIT) 4,000 unit/mL injection Inject 1.41 mLs (5,640 Units total) into the skin every Tues, Thurs, Sat. Administered by dialysis unit on T/Th/Sat.    everolimus, immunosuppressive, (ZORTRESS) 0.75 mg tablet Take 1 tablet (0.75 mg total) by mouth every 12 (twelve) hours.    folic acid (FOLVITE) 1 MG tablet TAKE 1 TABLET DAILY     "furosemide (LASIX) 40 MG tablet TAKE 1 TABLET BY MOUTH EVERY DAY AS NEEDED FOR LEG SWELLING    insulin aspart U-100 (NOVOLOG FLEXPEN U-100 INSULIN) 100 unit/mL (3 mL) InPn pen Inject 10 Units into the skin 3 (three) times daily with meals.    insulin aspart U-100 (NOVOLOG) 100 unit/mL (3 mL) InPn pen Inject 0-5 Units into the skin before meals and at bedtime as needed (Hyperglycemia). Use in addition to 10 units TID with meals    insulin degludec (TRESIBA FLEXTOUCH U-200) 200 unit/mL (3 mL) insulin pen Inject 20 Units into the skin every evening. (Patient taking differently: Inject 26 Units into the skin every evening.)    lancets Misc To check BG 4 times daily, to use with insurance preferred meter    magnesium chloride (MAG 64) 64 mg TbEC Take 2 tablets (128 mg total) by mouth 2 (two) times daily.    ondansetron (ZOFRAN-ODT) 8 MG TbDL Dissolve 1 tablet (8 mg total) by mouth daily as needed (pre-med for IVIG infusions).    ONETOUCH VERIO Strp USE TO CHECK BLOOD GLUCOSE FOUR TIMES A DAY, TO USE WITH INSURANCE PREFERRED METER    pantoprazole (PROTONIX) 40 MG tablet Take 1 tablet (40 mg total) by mouth once daily.    pen needle, diabetic (BD ULTRA-FINE JIM PEN NEEDLE) 32 gauge x 5/32" Ndle Uses 4 times daily, on multiple daily insulin injections    predniSONE (DELTASONE) 5 MG tablet Take 1 tablet (5 mg total) by mouth once daily.    rosuvastatin (CRESTOR) 10 MG tablet Take 1 tablet (10 mg total) by mouth once daily.    sevelamer carbonate (RENVELA) 800 mg Tab TAKE 2 TABLETS BY MOUTH 3 TIMES A DAY WITH MEALS.    sodium bicarbonate 650 MG tablet Two in am and two in pm    sulfamethoxazole-trimethoprim 400-80mg (BACTRIM,SEPTRA) 400-80 mg per tablet Take 1 tablet by mouth every Mon, Wed, Fri.    tacrolimus (PROGRAF) 0.5 MG Cap Take 6 capsules (3 mg total) by mouth every 12 (twelve) hours.    valsartan (DIOVAN) 160 MG tablet Take 1 tablet (160 mg total) by mouth 2 (two) times daily.    [DISCONTINUED] " ACCU-CHEK DEANNE PLUS METER Misc USE AS DIRECTED     Family History     Problem Relation (Age of Onset)    Blindness Father    Diabetes Father, Brother    Hypertension Mother    Kidney disease Sister        Tobacco Use    Smoking status: Never Smoker    Smokeless tobacco: Never Used   Substance and Sexual Activity    Alcohol use: No    Drug use: No    Sexual activity: Not on file     Review of Systems   Constitutional: Positive for activity change and appetite change. Negative for chills, fatigue, fever and unexpected weight change.   HENT: Negative for congestion.    Eyes: Negative for visual disturbance.   Respiratory: Positive for shortness of breath. Negative for chest tightness.    Cardiovascular: Negative for chest pain and palpitations.   Gastrointestinal: Negative for abdominal distention, abdominal pain, blood in stool, constipation, diarrhea, nausea and vomiting.   Genitourinary: Negative for dysuria and hematuria.   Musculoskeletal: Negative for arthralgias and back pain.   Skin: Negative for color change.   Neurological: Negative for dizziness, syncope and numbness.   Psychiatric/Behavioral: Negative for confusion.   All other systems reviewed and are negative.    Objective:     Vital Signs (Most Recent):  Temp: 97.5 °F (36.4 °C) (02/13/22 0344)  Pulse: 83 (02/13/22 0344)  Resp: 18 (02/13/22 0119)  BP: 121/74 (02/13/22 0344)  SpO2: 96 % (02/13/22 0344) Vital Signs (24h Range):  Temp:  [97.4 °F (36.3 °C)-97.7 °F (36.5 °C)] 97.5 °F (36.4 °C)  Pulse:  [83-92] 83  Resp:  [18] 18  SpO2:  [94 %-97 %] 96 %  BP: (106-129)/(74-88) 121/74     Weight: 94.7 kg (208 lb 12.4 oz)  Body mass index is 29.12 kg/m².    Physical Exam  Vitals reviewed.   Constitutional:       General: He is not in acute distress.     Comments: Alert oriented x4, appears short of breath at times unable to finish his sentence   HENT:      Head: Normocephalic.      Mouth/Throat:      Mouth: Mucous membranes are moist.   Eyes:      General:  No scleral icterus.     Extraocular Movements: Extraocular movements intact.      Pupils: Pupils are equal, round, and reactive to light.   Cardiovascular:      Rate and Rhythm: Normal rate and regular rhythm.      Heart sounds: No murmur heard.      Pulmonary:      Effort: Pulmonary effort is normal. No respiratory distress.      Breath sounds: Wheezing and rales present. No rhonchi.      Comments: On 2 L nasal cannula satting 95%  Abdominal:      General: Abdomen is flat. There is no distension.      Palpations: Abdomen is soft.      Tenderness: There is no abdominal tenderness. There is no guarding.   Musculoskeletal:         General: No swelling. Normal range of motion.      Cervical back: Normal range of motion.      Right lower leg: No edema.      Left lower leg: No edema.   Skin:     General: Skin is warm.      Capillary Refill: Capillary refill takes less than 2 seconds.   Neurological:      General: No focal deficit present.      Mental Status: He is alert and oriented to person, place, and time.      Motor: No weakness.           CRANIAL NERVES     CN III, IV, VI   Pupils are equal, round, and reactive to light.       Significant Labs:   All pertinent labs within the past 24 hours have been reviewed.  A1C:   Recent Labs   Lab 09/20/21  0901 12/13/21  0658   HGBA1C 9.5* 8.1*     ABGs: No results for input(s): PH, PCO2, HCO3, POCSATURATED, BE, TOTALHB, COHB, METHB, O2HB, POCFIO2, PO2 in the last 48 hours.  Bilirubin:   Recent Labs   Lab 02/07/22  1256   BILIDIR 0.2   BILITOT 0.6     Blood Culture: No results for input(s): LABBLOO in the last 48 hours.  BMP: No results for input(s): GLU, NA, K, CL, CO2, BUN, CREATININE, CALCIUM, MG in the last 48 hours.  CBC: No results for input(s): WBC, HGB, HCT, PLT in the last 48 hours.  CMP: No results for input(s): NA, K, CL, CO2, GLU, BUN, CREATININE, CALCIUM, PROT, ALBUMIN, BILITOT, ALKPHOS, AST, ALT, ANIONGAP, EGFRNONAA in the last 48 hours.    Invalid input(s):  ESTGFAFRICA  Cardiac Markers:   Recent Labs   Lab 02/13/22 0115   *     Coagulation:   Recent Labs   Lab 02/13/22 0115   INR 1.0   APTT 55.2*     Lactic Acid:   Recent Labs   Lab 02/13/22 0115   LACTATE 1.0     Lipase: No results for input(s): LIPASE in the last 48 hours.  Lipid Panel: No results for input(s): CHOL, HDL, LDLCALC, TRIG, CHOLHDL in the last 48 hours.  Magnesium: No results for input(s): MG in the last 48 hours.  Pathology Results  (Last 10 years)               04/16/21 0949  Specimen to Pathology, Radiology Kidney, [place] biopsy Final result    Narrative:  LM, IF, C4D,EM   Release to patient->Immediate       01/13/21 0934  Specimen to Pathology, Surgery Pulmonary and Thoracic Final result    Narrative:  Pre-op Diagnosis: Complication of lung transplant   Number of specimens: 8   Name of specimens: RLL TBBx   Release to patient->7 Day Delay   Specimen total (fresh, frozen, permanent):->8           POCT Glucose: No results for input(s): POCTGLUCOSE in the last 48 hours.  Prealbumin: No results for input(s): PREALBUMIN in the last 48 hours.  Respiratory Culture: No results for input(s): GSRESP, RESPIRATORYC in the last 48 hours.  Troponin:   Recent Labs   Lab 02/13/22 0115   TROPONINI 0.026     TSH: No results for input(s): TSH in the last 4320 hours.  Urine Culture: No results for input(s): LABURIN in the last 48 hours.  Urine Studies: No results for input(s): COLORU, APPEARANCEUA, PHUR, SPECGRAV, PROTEINUA, GLUCUA, KETONESU, BILIRUBINUA, OCCULTUA, NITRITE, UROBILINOGEN, LEUKOCYTESUR, RBCUA, WBCUA, BACTERIA, SQUAMEPITHEL, HYALINECASTS in the last 48 hours.    Invalid input(s): WRIGHTSUR    Significant Imaging: I have reviewed all pertinent imaging results/findings within the past 24 hours.

## 2022-02-14 LAB
ALBUMIN SERPL BCP-MCNC: 3 G/DL (ref 3.5–5.2)
ALP SERPL-CCNC: 64 U/L (ref 55–135)
ALT SERPL W/O P-5'-P-CCNC: 15 U/L (ref 10–44)
ANION GAP SERPL CALC-SCNC: 22 MMOL/L (ref 8–16)
ASCENDING AORTA: 3.35 CM
AST SERPL-CCNC: 18 U/L (ref 10–40)
AV INDEX (PROSTH): 1.04
AV MEAN GRADIENT: 3 MMHG
AV PEAK GRADIENT: 4 MMHG
AV VALVE AREA: 4.68 CM2
AV VELOCITY RATIO: 0.9
BASOPHILS # BLD AUTO: 0.01 K/UL (ref 0–0.2)
BASOPHILS NFR BLD: 0.1 % (ref 0–1.9)
BILIRUB SERPL-MCNC: 0.4 MG/DL (ref 0.1–1)
BSA FOR ECHO PROCEDURE: 2.17 M2
BUN SERPL-MCNC: 106 MG/DL (ref 6–20)
CALCIUM SERPL-MCNC: 9.3 MG/DL (ref 8.7–10.5)
CHLORIDE SERPL-SCNC: 94 MMOL/L (ref 95–110)
CO2 SERPL-SCNC: 20 MMOL/L (ref 23–29)
CREAT SERPL-MCNC: 12.5 MG/DL (ref 0.5–1.4)
CV ECHO LV RWT: 0.43 CM
CYTOMEGALOVIRUS DNA: DETECTED
CYTOMEGALOVIRUS LOG (IU/ML): <1.7 LOGIU/ML
CYTOMEGALOVIRUS PCR, QUANT: <50 IU/ML
DIFFERENTIAL METHOD: ABNORMAL
DOP CALC AO PEAK VEL: 0.94 M/S
DOP CALC AO VTI: 15.91 CM
DOP CALC LVOT AREA: 4.5 CM2
DOP CALC LVOT DIAMETER: 2.39 CM
DOP CALC LVOT PEAK VEL: 0.85 M/S
DOP CALC LVOT STROKE VOLUME: 74.52 CM3
DOP CALCLVOT PEAK VEL VTI: 16.62 CM
ECHO LV POSTERIOR WALL: 1.02 CM (ref 0.6–1.1)
EJECTION FRACTION: 50 %
EOSINOPHIL # BLD AUTO: 0 K/UL (ref 0–0.5)
EOSINOPHIL NFR BLD: 0 % (ref 0–8)
ERYTHROCYTE [DISTWIDTH] IN BLOOD BY AUTOMATED COUNT: 13.1 % (ref 11.5–14.5)
EST. GFR  (AFRICAN AMERICAN): 4.5 ML/MIN/1.73 M^2
EST. GFR  (NON AFRICAN AMERICAN): 3.9 ML/MIN/1.73 M^2
FACT X PPP CHRO-ACNC: 0.37 IU/ML (ref 0.3–0.7)
FRACTIONAL SHORTENING: 19 % (ref 28–44)
GLUCOSE SERPL-MCNC: 109 MG/DL (ref 70–110)
HAV IGG SER QL IA: NEGATIVE
HBV SURFACE AB SER-ACNC: NEGATIVE M[IU]/ML
HCT VFR BLD AUTO: 33.2 % (ref 40–54)
HGB BLD-MCNC: 10.6 G/DL (ref 14–18)
IMM GRANULOCYTES # BLD AUTO: 0.16 K/UL (ref 0–0.04)
IMM GRANULOCYTES NFR BLD AUTO: 1.3 % (ref 0–0.5)
INTERVENTRICULAR SEPTUM: 1.25 CM (ref 0.6–1.1)
LA MINOR: 5.41 CM
LA WIDTH: 4.37 CM
LEFT ATRIUM SIZE: 3.72 CM
LEFT INTERNAL DIMENSION IN SYSTOLE: 3.89 CM (ref 2.1–4)
LEFT VENTRICLE DIASTOLIC VOLUME INDEX: 50.23 ML/M2
LEFT VENTRICLE DIASTOLIC VOLUME: 107.49 ML
LEFT VENTRICLE MASS INDEX: 95 G/M2
LEFT VENTRICLE SYSTOLIC VOLUME INDEX: 30.6 ML/M2
LEFT VENTRICLE SYSTOLIC VOLUME: 65.53 ML
LEFT VENTRICULAR INTERNAL DIMENSION IN DIASTOLE: 4.8 CM (ref 3.5–6)
LEFT VENTRICULAR MASS: 202.61 G
LYMPHOCYTES # BLD AUTO: 1.5 K/UL (ref 1–4.8)
LYMPHOCYTES NFR BLD: 11.8 % (ref 18–48)
MAGNESIUM SERPL-MCNC: 2.4 MG/DL (ref 1.6–2.6)
MCH RBC QN AUTO: 29.8 PG (ref 27–31)
MCHC RBC AUTO-ENTMCNC: 31.9 G/DL (ref 32–36)
MCV RBC AUTO: 93 FL (ref 82–98)
MONOCYTES # BLD AUTO: 1 K/UL (ref 0.3–1)
MONOCYTES NFR BLD: 8.1 % (ref 4–15)
NEUTROPHILS # BLD AUTO: 9.9 K/UL (ref 1.8–7.7)
NEUTROPHILS NFR BLD: 78.7 % (ref 38–73)
NRBC BLD-RTO: 0 /100 WBC
PHOSPHATE SERPL-MCNC: 9.4 MG/DL (ref 2.7–4.5)
PLATELET # BLD AUTO: 377 K/UL (ref 150–450)
PMV BLD AUTO: 9.8 FL (ref 9.2–12.9)
POCT GLUCOSE: 131 MG/DL (ref 70–110)
POCT GLUCOSE: 132 MG/DL (ref 70–110)
POCT GLUCOSE: 144 MG/DL (ref 70–110)
POCT GLUCOSE: 150 MG/DL (ref 70–110)
POCT GLUCOSE: 163 MG/DL (ref 70–110)
POCT GLUCOSE: 168 MG/DL (ref 70–110)
POCT GLUCOSE: 170 MG/DL (ref 70–110)
POCT GLUCOSE: 233 MG/DL (ref 70–110)
POCT GLUCOSE: 253 MG/DL (ref 70–110)
POCT GLUCOSE: 284 MG/DL (ref 70–110)
POCT GLUCOSE: 296 MG/DL (ref 70–110)
POCT GLUCOSE: 299 MG/DL (ref 70–110)
POCT GLUCOSE: 312 MG/DL (ref 70–110)
POCT GLUCOSE: 314 MG/DL (ref 70–110)
POCT GLUCOSE: 331 MG/DL (ref 70–110)
POCT GLUCOSE: 338 MG/DL (ref 70–110)
POTASSIUM SERPL-SCNC: 4.5 MMOL/L (ref 3.5–5.1)
PROT SERPL-MCNC: 7.1 G/DL (ref 6–8.4)
RBC # BLD AUTO: 3.56 M/UL (ref 4.6–6.2)
SINUS: 3.41 CM
SODIUM SERPL-SCNC: 136 MMOL/L (ref 136–145)
STJ: 3.67 CM
TACROLIMUS BLD-MCNC: 25.4 NG/ML (ref 5–15)
TDI LATERAL: 0.1 M/S
TDI SEPTAL: 0.05 M/S
TDI: 0.08 M/S
WBC # BLD AUTO: 12.59 K/UL (ref 3.9–12.7)

## 2022-02-14 PROCEDURE — 63600175 PHARM REV CODE 636 W HCPCS: Performed by: STUDENT IN AN ORGANIZED HEALTH CARE EDUCATION/TRAINING PROGRAM

## 2022-02-14 PROCEDURE — 25000003 PHARM REV CODE 250

## 2022-02-14 PROCEDURE — 97165 OT EVAL LOW COMPLEX 30 MIN: CPT

## 2022-02-14 PROCEDURE — 27000646 HC AEROBIKA DEVICE

## 2022-02-14 PROCEDURE — 99232 PR SUBSEQUENT HOSPITAL CARE,LEVL II: ICD-10-PCS | Mod: GC,,, | Performed by: INTERNAL MEDICINE

## 2022-02-14 PROCEDURE — 80053 COMPREHEN METABOLIC PANEL: CPT | Performed by: STUDENT IN AN ORGANIZED HEALTH CARE EDUCATION/TRAINING PROGRAM

## 2022-02-14 PROCEDURE — 12000002 HC ACUTE/MED SURGE SEMI-PRIVATE ROOM

## 2022-02-14 PROCEDURE — 25000003 PHARM REV CODE 250: Performed by: STUDENT IN AN ORGANIZED HEALTH CARE EDUCATION/TRAINING PROGRAM

## 2022-02-14 PROCEDURE — 86706 HEP B SURFACE ANTIBODY: CPT | Performed by: INTERNAL MEDICINE

## 2022-02-14 PROCEDURE — 25000242 PHARM REV CODE 250 ALT 637 W/ HCPCS: Performed by: STUDENT IN AN ORGANIZED HEALTH CARE EDUCATION/TRAINING PROGRAM

## 2022-02-14 PROCEDURE — 85025 COMPLETE CBC W/AUTO DIFF WBC: CPT | Performed by: STUDENT IN AN ORGANIZED HEALTH CARE EDUCATION/TRAINING PROGRAM

## 2022-02-14 PROCEDURE — 83735 ASSAY OF MAGNESIUM: CPT | Performed by: STUDENT IN AN ORGANIZED HEALTH CARE EDUCATION/TRAINING PROGRAM

## 2022-02-14 PROCEDURE — 94761 N-INVAS EAR/PLS OXIMETRY MLT: CPT

## 2022-02-14 PROCEDURE — 36410 VNPNXR 3YR/> PHY/QHP DX/THER: CPT

## 2022-02-14 PROCEDURE — 80197 ASSAY OF TACROLIMUS: CPT | Performed by: STUDENT IN AN ORGANIZED HEALTH CARE EDUCATION/TRAINING PROGRAM

## 2022-02-14 PROCEDURE — 97535 SELF CARE MNGMENT TRAINING: CPT

## 2022-02-14 PROCEDURE — 25500020 PHARM REV CODE 255: Performed by: STUDENT IN AN ORGANIZED HEALTH CARE EDUCATION/TRAINING PROGRAM

## 2022-02-14 PROCEDURE — 27000207 HC ISOLATION

## 2022-02-14 PROCEDURE — C1751 CATH, INF, PER/CENT/MIDLINE: HCPCS

## 2022-02-14 PROCEDURE — 97116 GAIT TRAINING THERAPY: CPT

## 2022-02-14 PROCEDURE — C9399 UNCLASSIFIED DRUGS OR BIOLOG: HCPCS

## 2022-02-14 PROCEDURE — 99233 SBSQ HOSP IP/OBS HIGH 50: CPT | Mod: GC,,, | Performed by: STUDENT IN AN ORGANIZED HEALTH CARE EDUCATION/TRAINING PROGRAM

## 2022-02-14 PROCEDURE — 86790 VIRUS ANTIBODY NOS: CPT | Performed by: INTERNAL MEDICINE

## 2022-02-14 PROCEDURE — 63600175 PHARM REV CODE 636 W HCPCS

## 2022-02-14 PROCEDURE — 97161 PT EVAL LOW COMPLEX 20 MIN: CPT

## 2022-02-14 PROCEDURE — 27000173 HC ACAPELLA DEVICE DH OR DM

## 2022-02-14 PROCEDURE — 99232 SBSQ HOSP IP/OBS MODERATE 35: CPT | Mod: GC,,, | Performed by: INTERNAL MEDICINE

## 2022-02-14 PROCEDURE — 85520 HEPARIN ASSAY: CPT | Performed by: STUDENT IN AN ORGANIZED HEALTH CARE EDUCATION/TRAINING PROGRAM

## 2022-02-14 PROCEDURE — 84100 ASSAY OF PHOSPHORUS: CPT | Performed by: STUDENT IN AN ORGANIZED HEALTH CARE EDUCATION/TRAINING PROGRAM

## 2022-02-14 PROCEDURE — 36415 COLL VENOUS BLD VENIPUNCTURE: CPT | Performed by: STUDENT IN AN ORGANIZED HEALTH CARE EDUCATION/TRAINING PROGRAM

## 2022-02-14 PROCEDURE — 99900035 HC TECH TIME PER 15 MIN (STAT)

## 2022-02-14 PROCEDURE — 99233 PR SUBSEQUENT HOSPITAL CARE,LEVL III: ICD-10-PCS | Mod: GC,,, | Performed by: STUDENT IN AN ORGANIZED HEALTH CARE EDUCATION/TRAINING PROGRAM

## 2022-02-14 PROCEDURE — 94640 AIRWAY INHALATION TREATMENT: CPT

## 2022-02-14 PROCEDURE — 94664 DEMO&/EVAL PT USE INHALER: CPT

## 2022-02-14 PROCEDURE — 76937 US GUIDE VASCULAR ACCESS: CPT

## 2022-02-14 RX ORDER — SEVELAMER CARBONATE 800 MG/1
1600 TABLET, FILM COATED ORAL
Status: DISCONTINUED | OUTPATIENT
Start: 2022-02-14 | End: 2022-02-17 | Stop reason: HOSPADM

## 2022-02-14 RX ORDER — IBUPROFEN 200 MG
16 TABLET ORAL
Status: DISCONTINUED | OUTPATIENT
Start: 2022-02-14 | End: 2022-02-17 | Stop reason: HOSPADM

## 2022-02-14 RX ORDER — SEVELAMER CARBONATE 800 MG/1
1600 TABLET, FILM COATED ORAL
Status: DISCONTINUED | OUTPATIENT
Start: 2022-02-14 | End: 2022-02-14

## 2022-02-14 RX ORDER — GUAIFENESIN 600 MG/1
600 TABLET, EXTENDED RELEASE ORAL 2 TIMES DAILY PRN
Status: DISCONTINUED | OUTPATIENT
Start: 2022-02-14 | End: 2022-02-17 | Stop reason: HOSPADM

## 2022-02-14 RX ORDER — GLUCAGON 1 MG
1 KIT INJECTION
Status: DISCONTINUED | OUTPATIENT
Start: 2022-02-14 | End: 2022-02-17 | Stop reason: HOSPADM

## 2022-02-14 RX ORDER — INSULIN ASPART 100 [IU]/ML
10 INJECTION, SOLUTION INTRAVENOUS; SUBCUTANEOUS
Status: DISCONTINUED | OUTPATIENT
Start: 2022-02-14 | End: 2022-02-17 | Stop reason: HOSPADM

## 2022-02-14 RX ORDER — SODIUM CHLORIDE 9 MG/ML
INJECTION, SOLUTION INTRAVENOUS ONCE
Status: DISCONTINUED | OUTPATIENT
Start: 2022-02-15 | End: 2022-02-17

## 2022-02-14 RX ORDER — IBUPROFEN 200 MG
24 TABLET ORAL
Status: DISCONTINUED | OUTPATIENT
Start: 2022-02-14 | End: 2022-02-17 | Stop reason: HOSPADM

## 2022-02-14 RX ORDER — ROSUVASTATIN CALCIUM 10 MG/1
10 TABLET, COATED ORAL DAILY
Qty: 30 TABLET | Refills: 3 | Status: SHIPPED | OUTPATIENT
Start: 2022-02-14 | End: 2022-06-24 | Stop reason: SDUPTHER

## 2022-02-14 RX ORDER — INSULIN ASPART 100 [IU]/ML
1-10 INJECTION, SOLUTION INTRAVENOUS; SUBCUTANEOUS
Status: DISCONTINUED | OUTPATIENT
Start: 2022-02-14 | End: 2022-02-17 | Stop reason: HOSPADM

## 2022-02-14 RX ADMIN — OXYCODONE HYDROCHLORIDE AND ACETAMINOPHEN 500 MG: 500 TABLET ORAL at 09:02

## 2022-02-14 RX ADMIN — VALSARTAN 160 MG: 160 TABLET, FILM COATED ORAL at 09:02

## 2022-02-14 RX ADMIN — THERA TABS 1 TABLET: TAB at 08:02

## 2022-02-14 RX ADMIN — AMLODIPINE BESYLATE 10 MG: 10 TABLET ORAL at 08:02

## 2022-02-14 RX ADMIN — SEVELAMER CARBONATE 1600 MG: 800 TABLET, FILM COATED ORAL at 08:02

## 2022-02-14 RX ADMIN — MUPIROCIN: 20 OINTMENT TOPICAL at 09:02

## 2022-02-14 RX ADMIN — ALBUTEROL SULFATE 2 PUFF: 108 INHALANT RESPIRATORY (INHALATION) at 07:02

## 2022-02-14 RX ADMIN — SODIUM BICARBONATE 650 MG TABLET 650 MG: at 09:02

## 2022-02-14 RX ADMIN — EVEROLIMUS 0.75 MG: 0.75 TABLET ORAL at 05:02

## 2022-02-14 RX ADMIN — CARVEDILOL 3.12 MG: 3.12 TABLET, FILM COATED ORAL at 09:02

## 2022-02-14 RX ADMIN — VALSARTAN 160 MG: 160 TABLET, FILM COATED ORAL at 08:02

## 2022-02-14 RX ADMIN — OXYCODONE HYDROCHLORIDE AND ACETAMINOPHEN 500 MG: 500 TABLET ORAL at 08:02

## 2022-02-14 RX ADMIN — INSULIN DETEMIR 26 UNITS: 100 INJECTION, SOLUTION SUBCUTANEOUS at 09:02

## 2022-02-14 RX ADMIN — CLONIDINE HYDROCHLORIDE 0.1 MG: 0.1 TABLET ORAL at 02:02

## 2022-02-14 RX ADMIN — DEXAMETHASONE 6 MG: 4 TABLET ORAL at 08:02

## 2022-02-14 RX ADMIN — INSULIN HUMAN 3.8 UNITS/HR: 1 INJECTION, SOLUTION INTRAVENOUS at 02:02

## 2022-02-14 RX ADMIN — INSULIN ASPART 10 UNITS: 100 INJECTION, SOLUTION INTRAVENOUS; SUBCUTANEOUS at 05:02

## 2022-02-14 RX ADMIN — ALBUTEROL SULFATE 2 PUFF: 108 INHALANT RESPIRATORY (INHALATION) at 01:02

## 2022-02-14 RX ADMIN — ATORVASTATIN CALCIUM 40 MG: 20 TABLET, FILM COATED ORAL at 08:02

## 2022-02-14 RX ADMIN — INSULIN ASPART 6 UNITS: 100 INJECTION, SOLUTION INTRAVENOUS; SUBCUTANEOUS at 05:02

## 2022-02-14 RX ADMIN — HUMAN ALBUMIN MICROSPHERES AND PERFLUTREN 0.66 MG: 10; .22 INJECTION, SOLUTION INTRAVENOUS at 10:02

## 2022-02-14 RX ADMIN — HEPARIN SODIUM 12 UNITS/KG/HR: 5000 INJECTION INTRAVENOUS; SUBCUTANEOUS at 09:02

## 2022-02-14 RX ADMIN — ALBUTEROL SULFATE 2 PUFF: 108 INHALANT RESPIRATORY (INHALATION) at 12:02

## 2022-02-14 RX ADMIN — ALBUTEROL SULFATE 2 PUFF: 108 INHALANT RESPIRATORY (INHALATION) at 08:02

## 2022-02-14 RX ADMIN — PANTOPRAZOLE SODIUM 40 MG: 40 TABLET, DELAYED RELEASE ORAL at 08:02

## 2022-02-14 RX ADMIN — EVEROLIMUS 0.75 MG: 0.75 TABLET ORAL at 08:02

## 2022-02-14 RX ADMIN — REMDESIVIR 100 MG: 100 INJECTION, POWDER, LYOPHILIZED, FOR SOLUTION INTRAVENOUS at 12:02

## 2022-02-14 RX ADMIN — INSULIN DETEMIR 26 UNITS: 100 INJECTION, SOLUTION SUBCUTANEOUS at 12:02

## 2022-02-14 RX ADMIN — CLONIDINE HYDROCHLORIDE 0.1 MG: 0.1 TABLET ORAL at 08:02

## 2022-02-14 RX ADMIN — SEVELAMER CARBONATE 1600 MG: 800 TABLET, FILM COATED ORAL at 05:02

## 2022-02-14 RX ADMIN — CLONIDINE HYDROCHLORIDE 0.1 MG: 0.1 TABLET ORAL at 09:02

## 2022-02-14 RX ADMIN — FOLIC ACID 1000 MCG: 1 TABLET ORAL at 08:02

## 2022-02-14 RX ADMIN — CARVEDILOL 3.12 MG: 3.12 TABLET, FILM COATED ORAL at 08:02

## 2022-02-14 RX ADMIN — INSULIN HUMAN 1.2 UNITS/HR: 1 INJECTION, SOLUTION INTRAVENOUS at 10:02

## 2022-02-14 RX ADMIN — SODIUM BICARBONATE 650 MG TABLET 650 MG: at 08:02

## 2022-02-14 NOTE — PLAN OF CARE
Problem: Physical Therapy Goal  Goal: Physical Therapy Goal  Description: Goals to be met by: 22     Patient will increase functional independence with mobility by performin. Supine to sit with Modified [place]  2. Sit to supine with Modified [place]  3. Sit to stand transfer with Modified [place]  4. Gait  x 100 feet with Modified [place] using LRAD, if needed.   5. Stand for 5 minutes with Modified [place]  6. Lower extremity exercise program x10 reps per handout, with independence    Outcome: Ongoing, Progressing     Initial evaluation completed. Patient tolerated assessment well. Established POC and goals. Patient would continue to benefit from skilled PT services in order to improve functional mobility independence.     Doreen Luu, PT, DPT  2022

## 2022-02-14 NOTE — SUBJECTIVE & OBJECTIVE
Interval History: NAEON. Afebrile and VSS. Patient seen resting comfortably in bed. He reports some SOB, but stable. He denies cough, fever, chills, CP, and dizziness. He was transitioned off the insulin drip to subQ insulin. Continuing heparin drip. Pulm transplant will see him tomorrow. Holding tacrolimus due to high serum levels. Discontinued abx. CMV+.     Review of Systems   Constitutional: Negative for chills and fever.   HENT: Negative for congestion and rhinorrhea.    Eyes: Negative for pain and visual disturbance.   Respiratory: Positive for cough and shortness of breath. Negative for chest tightness.    Cardiovascular: Negative for chest pain and palpitations.   Gastrointestinal: Negative for abdominal distention, abdominal pain, constipation, diarrhea, nausea and vomiting.   Genitourinary: Negative for difficulty urinating and dysuria.   Musculoskeletal: Negative for back pain and neck pain.   Skin: Negative for rash and wound.   Neurological: Negative for dizziness and headaches.   Psychiatric/Behavioral: Negative for confusion and sleep disturbance.     Objective:     Vital Signs (Most Recent):  Temp: 97.4 °F (36.3 °C) (02/14/22 1538)  Pulse: 77 (02/14/22 1538)  Resp: 18 (02/14/22 1538)  BP: 137/71 (02/14/22 1538)  SpO2: 99 % (02/14/22 1538) Vital Signs (24h Range):  Temp:  [97.4 °F (36.3 °C)-97.9 °F (36.6 °C)] 97.4 °F (36.3 °C)  Pulse:  [73-87] 77  Resp:  [16-20] 18  SpO2:  [96 %-99 %] 99 %  BP: (100-137)/(58-79) 137/71     Weight: 94.3 kg (208 lb)  Body mass index is 29.01 kg/m².  No intake or output data in the 24 hours ending 02/14/22 1727   Physical Exam  Vitals and nursing note reviewed.   Constitutional:       General: He is not in acute distress.     Appearance: Normal appearance. He is not ill-appearing or diaphoretic.   HENT:      Head: Normocephalic and atraumatic.      Right Ear: External ear normal.      Left Ear: External ear normal.      Nose: Nose normal. No congestion or rhinorrhea.       Mouth/Throat:      Mouth: Mucous membranes are moist.      Pharynx: Oropharynx is clear.   Eyes:      General: No scleral icterus.     Extraocular Movements: Extraocular movements intact.      Pupils: Pupils are equal, round, and reactive to light.   Cardiovascular:      Rate and Rhythm: Normal rate and regular rhythm.      Pulses: Normal pulses.      Heart sounds: Normal heart sounds.   Pulmonary:      Effort: Pulmonary effort is normal.      Breath sounds: Rales present. No wheezing or rhonchi.   Abdominal:      General: Bowel sounds are increased. There is no distension.      Palpations: Abdomen is soft.      Tenderness: There is no abdominal tenderness. There is no right CVA tenderness or left CVA tenderness.   Musculoskeletal:         General: Normal range of motion.      Cervical back: Normal range of motion and neck supple. No tenderness.      Right lower leg: No edema.      Left lower leg: No edema.   Lymphadenopathy:      Cervical: No cervical adenopathy.   Skin:     General: Skin is warm and dry.   Neurological:      Mental Status: He is alert and oriented to person, place, and time.   Psychiatric:         Mood and Affect: Mood normal.         Behavior: Behavior normal.         Thought Content: Thought content normal.         Judgment: Judgment normal.         Significant Labs: All pertinent labs within the past 24 hours have been reviewed.    Significant Imaging: I have reviewed all pertinent imaging results/findings within the past 24 hours.

## 2022-02-14 NOTE — NURSING
Heparin gtt infusing, therapeutic this shift. Insulin gtt infusing, titrated per protocol, trending down. Minimal urine output, bladder scan negative. Pt denies pain or discomfort. 2L o2 for comfort, sats WNL. Vss.

## 2022-02-14 NOTE — ASSESSMENT & PLAN NOTE
Cytomegalovirus DNA Detec...   Cytomegalovirus Log (copies/mL) <1.70   Cytomegalovirus PCR, Quant <50     -Transplant Pulm following  - Will discuss starting valcyclovir or another antiviral

## 2022-02-14 NOTE — PLAN OF CARE
[facility] ([place] [id number])  Initial Discharge Assessment       Primary Care Provider: Michel Henley MD    Admission Diagnosis: COVID [U07.1]    Admission Date: 2/12/2022  Expected Discharge Date: 2/14/2022    Discharge Barriers Identified: None    Payor: HU[employer] MANAGED MEDICARE / Plan: HU[employer]NA SNP (SPECIAL NEEDS PLAN) / Product Type: Medicare Advantage /     Extended Emergency Contact Information  Primary Emergency Contact: Sybil Rain  Address: [address] [address] [facility]  Home Phone: [phone]  Mobile Phone: [phone]  Relation: Daughter  Secondary Emergency Contact: barbra rain  Mobile Phone: [phone]  Relation: Brother    Discharge Plan A: Home Health  Discharge Plan B: Home with Family    [id number] - [place], [place] - [address]  [address] [place] [address]  Phone: [phone] Fax: [phone]      Initial Assessment (most recent)       Adult Discharge Assessment - 02/14/22 1230          Discharge Assessment    Assessment Type Discharge Planning Assessment     Confirmed/corrected address, phone number and insurance Yes     Confirmed Demographics Correct on Facesheet     Source of Information patient     Communicated DERICK with patient/caregiver Date not available/Unable to determine     Reason For Admission Covid 19     Lives With alone     Facility Arrived From: From [facility]     Do you expect to return to your current living situation? Yes     Do you have help at home or someone to help you manage your care at home? Yes     Who are your caregiver(s) and their phone number(s)? Barbra colon     Prior to hospitilization cognitive status: Alert/Oriented     Current cognitive status: Alert/Oriented     Walking or Climbing Stairs Difficulty none     Dressing/Bathing Difficulty none     Equipment Currently Used at Home oxygen   CPAP Broken    Readmission within 30 days? No     Patient currently being followed  by outpatient case management? No     Do you currently have service(s) that help you manage your care at home? No     Do you take prescription medications? Yes     Do you have any problems affording any of your prescribed medications? No     Is the patient taking medications as prescribed? yes     Who is going to help you get home at discharge? brother     How do you get to doctors appointments? family or friend will provide;car, drives self     Are you on dialysis? Yes, [facility] on TTS at 6:15 AM    Do you take coumadin? No     Discharge Plan A Home Health    Discharge Plan B Home with Family     DME Needed Upon Discharge  none     Discharge Plan discussed with: Patient     Discharge Barriers Identified None                 CM spoke with patient for discharge planning.  Patient states he lives alone in a one story house with no steps to enter.  Patient states he will have the help of his brother at home if needed.  Patient is independent with ADL's and ambulating.  Patient's brother will transport patient upon discharge.  Plan is to discharge to home with home health.

## 2022-02-14 NOTE — ASSESSMENT & PLAN NOTE
Echo 2/14:   · Technically very difficult study  · The left ventricle is normal in size with concentric remodeling and low normal systolic function. The estimated ejection fraction is 50%.  · Normal left ventricular diastolic function.  · The right heart and IVC were not well visualized.    -Continue home clonidine 0.1mg TID, valsartan 160mg BID, amlodipine 10mg qd and Coreg 3.125mg BID

## 2022-02-14 NOTE — ASSESSMENT & PLAN NOTE
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 Suspect fluid overload in setting of viral PNA--- CMV Neg, no more diarrhea     Recommendations    1. Continue to monitor off Abx.  2. Continue Rem/Dexa per COVID-19 protocol.

## 2022-02-14 NOTE — CONSULTS
Single lumen 18g x 8 cm midline placed to right cephalic vein. Max dwell date 3/15/22, Lot# [id number].  Needle advanced into the vessel under real time ultrasound guidance.  Image recorded and saved.    Pt on heparin gtt, insulin gtt and receiving remdesivir IVPB. Pt has limb alert for left arm fistula. Midline placed for poor venous access

## 2022-02-14 NOTE — PROGRESS NOTES
[facility] - Intensive Care ([address] Medicine  Progress Note    Patient Name: Martin Hendrix Jr.  MRN: [id number]  Patient Class: IP- Inpatient   Admission Date: 2/12/2022  Length of Stay: 2 days  Attending Physician: Mike Cordoba MD  Primary Care Provider: Michel Henley MD        Subjective:     Principal Problem:COVID-19        HPI:  Patient is 61-year-old with did lung transplant 2017 (cb acute rejection), KRISTYN on CPAP, pulmonary hypertension, sarcoidosis, T2DM, HLD, ESRD on HD TTS ( most recent February 9th), presented to [place] on the 11th with dyspnea worsening over past 2 weeks.  Patient reported oxygen saturation of 88% on room air, he is on home oxygen as needed during exertion however was using it more often and at rest.  Reports requiring higher level of oxygen up to 5 L even at rest, which is unusual for him.  Today, the patient reports improvements in symptoms however he continues to be short of breath and quickly becomes tachypneic with worsening oxygen sat on ambulation.    At the outside hospital emergency room patient was COVID positive, chest x-ray with mid and lower lung interstitial disease (improved from previous).  Patient was requiring 4 L of oxygen satting 95%, was started on COVID protocol with Rocephin, azithromycin, dexamethasone and weight based Lovenox.  Due to patient's history of  lung transplant decision was made to [place] [facility] [place]monology evaluation. He was admitted to [facility] while awaiting a bed at [facility].  His labs were as follow:  COVID positive (February 11), INR 1.13, D-dimer 4226, BNP 97.1, troponin less than 0.03, CPK 93, sodium 130, potassium 5.4 (hemolyzed)-repeat potassium 4.8, chloride 91, CO2 18, , creatinine 19.07, glucose 125, AST 38, ALT 19, white blood cells 9.5, hemoglobin 11.3, hematocrit 33.7, platelets 284, lactic acid 0.9 ABG with pH 7.33/pCO2 34/PO2 80 (2 L nasal cannula) VS:  Temperature 98.3°, pulse 120, blood pressure 160/90, oxygen saturation 99%.     On presentation to [facility] patient was on 2 L satting 95%, hemodynamically stable.  Admitted to Hospital Medicine for further care and management.          Overview/Hospital Course:  Patient admitted to Hosp Med and started on dexamethasone and remdesivir in setting of COVID. ID, Lung Transplant both consulted. Patient having hyperglycemia likely due to dexamethasone in setting of T2DM, insulin drip started, work up for DKA pending. Patient sating 94% on RA since admission. Insulin gtt stopped and transitioned to basal+prandial SQ insulin. Holding tacro for high serum level.      Interval History: NAEON. Afebrile and VSS. Patient seen resting comfortably in bed. He reports some SOB, but stable. He denies cough, fever, chills, CP, and dizziness. He was transitioned off the insulin drip to subQ insulin. Continuing heparin drip. Pulm transplant will see him tomorrow. Holding tacrolimus due to high serum levels. Discontinued abx. CMV+.     Review of Systems   Constitutional: Negative for chills and fever.   HENT: Negative for congestion and rhinorrhea.    Eyes: Negative for pain and visual disturbance.   Respiratory: Positive for cough and shortness of breath. Negative for chest tightness.    Cardiovascular: Negative for chest pain and palpitations.   Gastrointestinal: Negative for abdominal distention, abdominal pain, constipation, diarrhea, nausea and vomiting.   Genitourinary: Negative for difficulty urinating and dysuria.   Musculoskeletal: Negative for back pain and neck pain.   Skin: Negative for rash and wound.   Neurological: Negative for dizziness and headaches.   Psychiatric/Behavioral: Negative for confusion and sleep disturbance.     Objective:     Vital Signs (Most Recent):  Temp: 97.4 °F (36.3 °C) (02/14/22 1538)  Pulse: 77 (02/14/22 1538)  Resp: 18 (02/14/22 1538)  BP: 137/71 (02/14/22 1538)  SpO2: 99 % (02/14/22 1538) Vital Signs  (24h Range):  Temp:  [97.4 °F (36.3 °C)-97.9 °F (36.6 °C)] 97.4 °F (36.3 °C)  Pulse:  [73-87] 77  Resp:  [16-20] 18  SpO2:  [96 %-99 %] 99 %  BP: (100-137)/(58-79) 137/71     Weight: 94.3 kg (208 lb)  Body mass index is 29.01 kg/m².  No intake or output data in the 24 hours ending 02/14/22 1727   Physical Exam  Vitals and nursing note reviewed.   Constitutional:       General: He is not in acute distress.     Appearance: Normal appearance. He is not ill-appearing or diaphoretic.   HENT:      Head: Normocephalic and atraumatic.      Right Ear: External ear normal.      Left Ear: External ear normal.      Nose: Nose normal. No congestion or rhinorrhea.      Mouth/Throat:      Mouth: Mucous membranes are moist.      Pharynx: Oropharynx is clear.   Eyes:      General: No scleral icterus.     Extraocular Movements: Extraocular movements intact.      Pupils: Pupils are equal, round, and reactive to light.   Cardiovascular:      Rate and Rhythm: Normal rate and regular rhythm.      Pulses: Normal pulses.      Heart sounds: Normal heart sounds.   Pulmonary:      Effort: Pulmonary effort is normal.      Breath sounds: Rales present. No wheezing or rhonchi.   Abdominal:      General: Bowel sounds are increased. There is no distension.      Palpations: Abdomen is soft.      Tenderness: There is no abdominal tenderness. There is no right CVA tenderness or left CVA tenderness.   Musculoskeletal:         General: Normal range of motion.      Cervical back: Normal range of motion and neck supple. No tenderness.      Right lower leg: No edema.      Left lower leg: No edema.   Lymphadenopathy:      Cervical: No cervical adenopathy.   Skin:     General: Skin is warm and dry.   Neurological:      Mental Status: He is alert and oriented to person, place, and time.   Psychiatric:         Mood and Affect: Mood normal.         Behavior: Behavior normal.         Thought Content: Thought content normal.         Judgment: Judgment normal.          Significant Labs: All pertinent labs within the past 24 hours have been reviewed.    Significant Imaging: I have reviewed all pertinent imaging results/findings within the past 24 hours.      Assessment/Plan:      * COVID-19  Patient is identified as High risk for severe complications of COVID 19 based on COVID risk score of 7   Initiate standard COVID protocols; COVID-19 testing Collection Date: 2/11/2022 Collection Time:   2:05 PM ,Infection Control notification  and isolation- respiratory, contact and droplet per protocol    Diagnostics: (leukopenia, hyponatremia, hyperferritinemia, elevated troponin, elevated d-dimer, age, and comorbidities are significant predictors of poor clinical outcome)  CBC, CMP, Procalcitonin, Ferritin, CRP, LDH, BNP, ECG, Blood Culture x2, Portable CXR, UA and culture, PTT and INR    Management: Initiate targeted therapy with Remdesivir, 200mg IV x1, followed by 100mg IV daily x5 days total and Dexamethasone PO/IV 6mg daily x10 days, Maintain oxygen saturations 92-96% via Nasal Cannula  LPM and monitor with continuous/intermittent pulse oximetry. , Inhaled bronchodilators as needed for shortness of breath., Continuous cardiac monitoring. and Manage respiratory failure (O2 requirement >10LPM or needing NIPPV/Mechanical ventilation) and/or Pneumonia (active chest infiltrates) separately as described below.  - satting well on Room air  - heparin drip continued, was started at outside hospital (was initially on Lovenox)   - continue on antibiotic coverage, pending ID and transplant eval      Advance Care Planning  Current advance care plan has been discussed with patient/family/POA and patient currently wishes Full Code.        ESRD (end stage renal disease)    Patient is on dialysis Tuesday Thursday and Saturday, however over the past week outside hospital he has been receiving dialysis Monday Wednesday and Friday, with most recent being Friday 11th.    - consult nephrology  -  phosphate binder t.i.d.  - avoid nephrotoxic agents  - monitor CMP    Hypertension associated with diabetes  Echo 2/14:   · Technically very difficult study  · The left ventricle is normal in size with concentric remodeling and low normal systolic function. The estimated ejection fraction is 50%.  · Normal left ventricular diastolic function.  · The right heart and IVC were not well visualized.    -Continue home clonidine 0.1mg TID, valsartan 160mg BID, amlodipine 10mg qd and Coreg 3.125mg BID      Mixed hyperlipidemia  home statin rosuvastatin 10mg    -Lipitor 40mg qd while inpatient      CMV (cytomegalovirus infection) status positive  Cytomegalovirus DNA Detec...   Cytomegalovirus Log (copies/mL) <1.70   Cytomegalovirus PCR, Quant <50     -Transplant Pulm following  - Will discuss starting valcyclovir or another antiviral       Type 2 diabetes mellitus with hyperglycemia, with long-term current use of insulin  -Last A1c reviewed-   Lab Results   Component Value Date    HGBA1C 8.1 (H) 12/13/2021       Home Antihyperglycemic Regiment:  -LDSSI, Aspart 10 u TID and deglu 20 u QHS    Inpatient Antihyperglycemic Regiment:  Antihyperglycemics (From admission, onward)            Start     Stop Route Frequency Ordered    02/14/22 1645  insulin aspart U-100 pen 10 Units         -- SubQ 3 times daily with meals 02/14/22 1208    02/14/22 1308  insulin aspart U-100 pen 1-10 Units         -- SubQ Before meals & nightly PRN 02/14/22 1208    02/14/22 1245  insulin detemir U-100 pen 26 Units         -- SubQ 2 times daily 02/14/22 1208        - Detemir 26 units BID   - SSI with POCT accuchecks ACHS and Diabetic diet 2000 byron  - Diabetic nutritional counseling given   - Goal 140-180 while IP          Lung replaced by transplant    Patient status post lung transplant x2  Transfer to [facility] for lung transplant services  Patient is on home Prograf and everolimus    - HOLD home Prograf and continue everolimus  - continue weaning oxygen as  tolerated  - transplant Pulm consulted  - ID consulted      VTE Risk Mitigation (From admission, onward)         Ordered     heparin 25,000 units in dextrose 5% (100 units/ml) IV bolus from bag - ADDITIONAL PRN BOLUS - 60 units/kg  As needed (PRN)        Question:  Heparin Infusion Adjustment (DO NOT MODIFY ANSWER)  Answer:  \\[email or web address]\epic\Images\Pharmacy\HeparinInfusions\heparin LOW INTENSITY nomogram with ANTI-XA [id number]    02/13/22 0611     heparin 25,000 units in dextrose 5% (100 units/ml) IV bolus from bag - ADDITIONAL PRN BOLUS - 30 units/kg  As needed (PRN)        Question:  Heparin Infusion Adjustment (DO NOT MODIFY ANSWER)  Answer:  \\[email or web address]\epic\Images\Pharmacy\HeparinInfusions\heparin LOW INTENSITY nomogram with ANTI-XA [id number]    02/13/22 0611     heparin 25,000 units in dextrose 5% 250 mL (100 units/mL) infusion LOW INTENSITY nomogram Anti- Xa  Continuous        Question Answer Comment   Heparin Infusion Adjustment (DO NOT MODIFY ANSWER) \\[email or web address]\epic\Images\Pharmacy\HeparinInfusions\heparin LOW INTENSITY nomogram with ANTI-XA [id number]    Begin at (in units/kg/hr) 12        02/13/22 0611     IP VTE HIGH RISK PATIENT  Once         02/13/22 0034     Place sequential compression device  Until discontinued         02/13/22 0034                Discharge Planning   DERICK: 2/16/2022     Code Status: Full Code   Is the patient medically ready for discharge?: No    Reason for patient still in hospital (select all that apply): Patient trending condition, Laboratory test, Treatment, Imaging and Consult recommendations  Discharge Plan A: Home Health                  Maximino Rangel MD  Department of Hospital Medicine   [facility] - Intensive Care ([place]-[facility])

## 2022-02-14 NOTE — CARE UPDATE
5:20 PM  Attempted to Call Sybil, patient's daughter, to give update. Left voicemail and will attempt to contact tomorrow.

## 2022-02-14 NOTE — ASSESSMENT & PLAN NOTE
-Last A1c reviewed-   Lab Results   Component Value Date    HGBA1C 8.1 (H) 12/13/2021       Home Antihyperglycemic Regiment:  -LDSSI, Aspart 10 u TID and deglu 20 u QHS    Inpatient Antihyperglycemic Regiment:  Antihyperglycemics (From admission, onward)            Start     Stop Route Frequency Ordered    02/14/22 1645  insulin aspart U-100 pen 10 Units         -- SubQ 3 times daily with meals 02/14/22 1208    02/14/22 1308  insulin aspart U-100 pen 1-10 Units         -- SubQ Before meals & nightly PRN 02/14/22 1208    02/14/22 1245  insulin detemir U-100 pen 26 Units         -- SubQ 2 times daily 02/14/22 1208        - Detemir 26 units BID   - SSI with POCT accuchecks ACHS and Diabetic diet 2000 byron  - Diabetic nutritional counseling given   - Goal 140-180 while IP

## 2022-02-14 NOTE — PT/OT/SLP EVAL
Physical Therapy Evaluation  Co-Eval with OT    Patient Name:  Martin Hendrix Jr.   MRN:  [id number]    Co-treatment performed due to patient's multiple deficits requiring two skilled therapists to appropriately and safely assess patient's strength and endurance while facilitating functional tasks in addition to accommodating for patient's activity tolerance.   Recommendations:     Discharge Recommendations:  home health PT   Discharge Equipment Recommendations: none   Barriers to discharge: None    Assessment:     Martin Hendrix Jr. is a 61 y.o. male admitted with a medical diagnosis of COVID-19.  He presents with the following impairments/functional limitations:  weakness,impaired endurance,impaired self care skills,gait instability,impaired functional mobilty,impaired balance,decreased lower extremity function,decreased coordination,decreased upper extremity function . Patient tolerated session well. Patient reports feeling close to his functional baseline.  Patient will benefit from PT services to address the mentioned deficits in order to promote an improve functional mobility status. Upon d/c, rec of HHPT for Martin Hendrix Jr. in order to progress towards an improved level of functional mobility independence.     Rehab Prognosis: Good; patient would benefit from acute skilled PT services to address these deficits and reach maximum level of function.    Recent Surgery: * No surgery found *      Plan:     During this hospitalization, patient to be seen 3 x/week to address the identified rehab impairments via gait training,therapeutic activities,therapeutic exercises,neuromuscular re-education and progress toward the following goals:    · Plan of Care Expires:  02/28/22    History:     Past Medical History:   Diagnosis Date    Acute rejection of lung transplant 11/3/2017    AVILA (acute kidney injury) 8/27/2017    Atrial fibrillation 8/23/2017    CKD (chronic kidney disease) stage 3, GFR 30-59 ml/min       [place]    DM (diabetes mellitus) 11/2017     02/22/2021 Insulin x 2017    Hypertension     On home oxygen therapy     KRISTYN on CPAP     Osteopenia     Pancreatitis 2009    hospitalized    Pulmonary hypertension     Pure hypercholesterolemia 11/15/2017    Sarcoidosis     Shortness of breath     Type 2 diabetes mellitus 11/5/2017       Past Surgical History:   Procedure Laterality Date    ABDOMINAL SURGERY      6 weeks old    AV FISTULA PLACEMENT Left 10/13/2021    Procedure: CREATION, AV FISTULA;  Surgeon: CARLI Thomason II, MD;  [facility] FLR;  Service: Vascular;  Laterality: Left;    BRONCHOSCOPY      BRONCHOSCOPY N/A 8/22/2018    Procedure: flexible bronchoscopy with tissue biopsy CPT 78389;  Surgeon: [facility] Diagnostic Provider;  [facility];  Service: Endoscopy;  Laterality: N/A;    BRONCHOSCOPY N/A 11/20/2018    Procedure: flexible bronchoscopy with tissue biopy CPT 56072;  Surgeon: [facility] Diagnostic Provider;  Location: [facility] FLR;  Service: Anesthesiology;  Laterality: N/A;    BRONCHOSCOPY N/A 11/10/2021    Procedure: Flexible bronchoscopy;  Surgeon: Samantha Bill DO;  [facility];  Service: Endoscopy;  Laterality: N/A;    CHEST TUBE INSERTION      CHOLECYSTECTOMY      COLONOSCOPY      ESOPHAGOGASTRODUODENOSCOPY N/A 8/6/2018    Procedure: EGD (ESOPHAGOGASTRODUODENOSCOPY);  Surgeon: Ramu Eisenberg MD;  Location: [facility] (2ND FLR);  Service: Endoscopy;  Laterality: N/A;  off pepcid and all acid reducers for 2 weeks; PA > 50    FISTULOGRAM Left 1/10/2022    Procedure: Fistulogram;  Surgeon: CARLI Thomason II, MD;  Location: [facility] CATH LAB;  Service: Vascular;  Laterality: Left;    INSERTION OF TUNNELED CENTRAL VENOUS HEMODIALYSIS CATHETER N/A 3/13/2019    Procedure: INSERTION, CATHETER, CENTRAL VENOUS, HEMODIALYSIS, TUNNELED;  Surgeon: Edy Gonzalez MD;  Location: [facility] CATH LAB;  Service: Nephrology;  Laterality: N/A;     "INSERTION OF TUNNELED CENTRAL VENOUS HEMODIALYSIS CATHETER Right 5/7/2021    Procedure: Insertion, Catheter, Central Venous, Hemodialysis;  Surgeon: Mary Joshi MD;  Location: [facility] CATH LAB;  Service: Interventional Nephrology;  Laterality: Right;    LUNG BIOPSY      LUNG TRANSPLANT  08/2017    PERCUTANEOUS TRANSLUMINAL ANGIOPLASTY OF ARTERIOVENOUS FISTULA N/A 1/10/2022    Procedure: PTA, AV FISTULA;  Surgeon: CARLI Thomason II, MD;  Location: [facility] CATH LAB;  Service: Vascular;  Laterality: N/A;    REMOVAL OF TUNNELED CENTRAL VENOUS CATHETER (CVC) N/A 5/16/2019    Procedure: REMOVAL, CATHETER, CENTRAL VENOUS, TUNNELED;  Surgeon: Edy Gonzalez MD;  Location: [facility] CATH LAB;  Service: Nephrology;  Laterality: N/A;    REVISION OF ARTERIOVENOUS FISTULA Left 11/18/2021    Procedure: REVISION, AV FISTULA;  Surgeon: CARLI Thomason II, MD;  [facility];  Service: Vascular;  Laterality: Left;  Ligation of side branches    TONSILLECTOMY         Subjective     Chief Complaint: none   Patient/Family Comments/goals: "I've just been a little unsteady since I've been here."  Pain/Comfort:  · Pain Rating 1: 0/10  · Pain Rating Post-Intervention 1: 0/10    Patients cultural, spiritual, Anglican conflicts given the current situation: no    Living Environment:  Patient's living environment is as follows:  Home type Home    1 or 2 stories  [facility]   Number of YVES/ rails 0 YVES   AD used?/Owned?  O2   Bathroom set-up  Tub/shower   Working? no   Driving? Yes    Individuals living with patient:  Alone    Hobbies/Roles: None stated   Prior to admission, patients level of function was (I) for ADLs and mobility. Patient reports 0 falls. Equipment used at home: oxygen.  DME owned (not currently used): none.  Upon discharge, patient will have assistance from family and friends.    Objective:     Communicated with RN prior to session.  Patient found up in chair with telemetry,peripheral IV  upon PT entry to room. " See below for detailed functional assessment. Patient agreeable to participate in initial evaluation.    General Precautions: Standard, fall,airborne,contact,droplet   Orthopedic Precautions:N/A   Braces: N/A     Exams:  · Cognitive Exam:  Patient is oriented to Person, Place, Time and Situation  · Patient is follows 100% of one -step commands   · Fine Motor Coordination:  Test:  Intact Impaired  Comments    Rapid ankle DF/PF  X     · Postural Exam:  Patient presented with the following abnormalities:    · -       Rounded shoulders  · -       Forward head  · Sensation:    LEFT LE: -       Intact  light/touch LE RIGHT LE:-       Intact  light/touch LE      ROM and Strength   Right Lower Extremity  Left Lower Extremity    Hip Flexion (Iliopsoas)  WFL WFL   Knee Extension (Quadriceps) WFL WFL   Knee Flexion (Hamstrings) WFL WFL   Ankle DF (Ant. Tib) WFL WFL   Ankle PF (Gastrocnemius)  WFL WFL     Functional Mobility:  · Bed Mobility:  Patient up in chair on arrival   · Transfers:     · Sit to Stand:  stand by assistance with no AD  · Gait: 35ft with SBA and no AD, 3-4 small LOB posteriorly due to scissoring of gait, but able to recover without physical assistance   · SOB upon completion O2>90%   · Mildly unsteady, lateral postural sway     Balance   Static Sitting (I)   Dynamic Sitting (I)   Static Standing SBA   Dynamic Standing       SBA-CGA     Therapeutic Activities and Education:  -Patient educated on the role and goal of PT services during acute care LOS. Question and concerns acknowledged and answered as appropriate.   -Will continue to educated as needed.      - Educated on the importance of OOB mobility within safe range in order to decreased adverse effects of prolonged bedrest.     -White board updated in patients room to reflect level of assistance needed with nursing.       - Patient is SBA for OOB mobility with RN.     AM-PAC 6 CLICK MOBILITY  Total Score:21     Patient left up in chair with all lines  intact, call button in reach and RN notified.  White board updated in patient room to reflect level of function with nursing.     GOALS:   Multidisciplinary Problems     Physical Therapy Goals        Problem: Physical Therapy Goal    Goal Priority Disciplines Outcome Goal Variances Interventions   Physical Therapy Goal     PT, PT/OT Ongoing, Progressing     Description: Goals to be met by: 22     Patient will increase functional independence with mobility by performin. Supine to sit with Modified Richardson  2. Sit to supine with Modified Richardson  3. Sit to stand transfer with Modified Richardson  4. Gait  x 100 feet with Modified Richardson using LRAD, if needed.   5. Stand for 5 minutes with Modified Richardson  6. Lower extremity exercise program x10 reps per handout, with independence                     Time Tracking:     PT Received On: 22  PT Start Time: 0953     PT Stop Time: 1011  PT Total Time (min): 18 min     Billable Minutes: Evaluation 10 and Gait Training 8      Doreen Luu, PT  2022

## 2022-02-14 NOTE — ASSESSMENT & PLAN NOTE
Patient is identified as High risk for severe complications of COVID 19 based on COVID risk score of 7   Initiate standard COVID protocols; COVID-19 testing Collection Date: 2/11/2022 Collection Time:   2:05 PM ,Infection Control notification  and isolation- respiratory, contact and droplet per protocol    Diagnostics: (leukopenia, hyponatremia, hyperferritinemia, elevated troponin, elevated d-dimer, age, and comorbidities are significant predictors of poor clinical outcome)  CBC, CMP, Procalcitonin, Ferritin, CRP, LDH, BNP, ECG, Blood Culture x2, Portable CXR, UA and culture, PTT and INR    Management: Initiate targeted therapy with Remdesivir, 200mg IV x1, followed by 100mg IV daily x5 days total and Dexamethasone PO/IV 6mg daily x10 days, Maintain oxygen saturations 92-96% via Nasal Cannula  LPM and monitor with continuous/intermittent pulse oximetry. , Inhaled bronchodilators as needed for shortness of breath., Continuous cardiac monitoring. and Manage respiratory failure (O2 requirement >10LPM or needing NIPPV/Mechanical ventilation) and/or Pneumonia (active chest infiltrates) separately as described below.  - satting well on Room air  - heparin drip continued, was started at outside hospital (was initially on Lovenox)   - continue on antibiotic coverage, pending ID and transplant eval      Advance Care Planning  Current advance care plan has been discussed with patient/family/POA and patient currently wishes Full Code.

## 2022-02-14 NOTE — SUBJECTIVE & OBJECTIVE
Interval History: no acute events    Review of Systems   Constitutional: Positive for activity change. Negative for appetite change, chills and fever.   HENT: Negative for congestion.    Eyes: Negative for pain and itching.   Respiratory: Negative for cough, chest tightness and shortness of breath.    Cardiovascular: Negative for palpitations and leg swelling.   Gastrointestinal: Negative for abdominal pain and nausea.   Endocrine: Negative for polyphagia and polyuria.   Genitourinary: Negative for dysuria and frequency.   Musculoskeletal: Negative for back pain.   Neurological: Negative for numbness and headaches.   Psychiatric/Behavioral: Negative for agitation and behavioral problems.     Objective:     Vital Signs (Most Recent):  Temp: 97.5 °F (36.4 °C) (02/14/22 0744)  Pulse: 83 (02/14/22 0828)  Resp: 18 (02/14/22 0828)  BP: 114/79 (02/14/22 0821)  SpO2: 96 % (02/14/22 0828) Vital Signs (24h Range):  Temp:  [97.5 °F (36.4 °C)-97.9 °F (36.6 °C)] 97.5 °F (36.4 °C)  Pulse:  [73-97] 83  Resp:  [16-22] 18  SpO2:  [95 %-98 %] 96 %  BP: (106-115)/(62-79) 114/79     Weight: 94.7 kg (208 lb 12.4 oz)  Body mass index is 29.12 kg/m².    Estimated Creatinine Clearance: 7.5 mL/min (A) (based on SCr of 12.5 mg/dL (H)).    Physical Exam  Constitutional:       Appearance: He is well-developed.   HENT:      Head: Normocephalic.   Cardiovascular:      Rate and Rhythm: Normal rate.   Pulmonary:      Effort: Pulmonary effort is normal.   Abdominal:      General: Bowel sounds are normal. There is no distension.      Palpations: Abdomen is soft.      Tenderness: There is no abdominal tenderness.   Musculoskeletal:         General: Normal range of motion.      Comments: Left arm AVF   Neurological:      Mental Status: He is alert and oriented to person, place, and time.   Psychiatric:         Mood and Affect: Mood and affect normal.         Behavior: Behavior normal.         Significant Labs: All pertinent labs within the past 24  hours have been reviewed.    Significant Imaging: I have reviewed all pertinent imaging results/findings within the past 24 hours.

## 2022-02-14 NOTE — ASSESSMENT & PLAN NOTE
Patient status post lung transplant x2  Transfer to [facility] for lung transplant services  Patient is on home Prograf and everolimus    - HOLD home Prograf and continue everolimus  - continue weaning oxygen as tolerated  - transplant Pulm consulted  - ID consulted

## 2022-02-14 NOTE — PROGRESS NOTES
[facility] - Intensive Care ([place] [address])  Infectious Disease  Progress Note    Patient Name: Martin Hendrix Jr.  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 Suspect fluid overload in setting of viral PNA--- CMV Neg, no more diarrhea     Recommendations    1. Continue to monitor off Abx.  2. Continue Rem/Dexa per COVID-19 protocol.  3. Monitor right hand previous IV sites- apply warm compresses.       Thank you for your consult. I will follow-up with patient. Please contact us if you have any additional questions.    Beto Carballo MD  Infectious Disease  [facility] - Intensive Care ([place] [address])    Subjective:     Principal Problem:COVID-19    HPI: Martin Hendrix Jr. 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 Negative for appetite change, chills and fever.   HENT: Negative for congestion.    Eyes: Negative for pain and itching.   Respiratory: Negative for cough, chest tightness and shortness of breath.    Cardiovascular: Negative for palpitations and leg swelling.   Gastrointestinal: Negative for abdominal pain and nausea.   Endocrine: Negative for polyphagia and polyuria.   Genitourinary: Negative for dysuria and frequency.   Musculoskeletal: Negative for back pain.   Neurological: Negative for numbness and headaches.   Psychiatric/Behavioral: Negative for agitation and behavioral problems.     Objective:     Vital Signs (Most Recent):  Temp: 97.5 °F (36.4 °C) (02/14/22 0744)  Pulse: 83 (02/14/22 0828)  Resp: 18 (02/14/22 0828)  BP: 114/79 (02/14/22 0821)  SpO2: 96 % (02/14/22 0828) Vital Signs (24h Range):  Temp:  [97.5 °F (36.4 °C)-97.9 °F (36.6 °C)] 97.5 °F (36.4 °C)  Pulse:  [73-97] 83  Resp:  [16-22] 18  SpO2:  [95 %-98 %] 96 %  BP: (106-115)/(62-79) 114/79     Weight: 94.7 kg (208 lb 12.4 oz)  Body mass index is 29.12 kg/m².    Estimated Creatinine Clearance: 7.5 mL/min (A) (based on SCr of 12.5 mg/dL (H)).    Physical Exam  Constitutional:       Appearance: He is well-developed.   HENT:      Head: Normocephalic.   Cardiovascular:      Rate and Rhythm: Normal rate.   Pulmonary:      Effort: Pulmonary effort is normal.   Abdominal:      General: Bowel sounds are normal. There is no distension.      Palpations: Abdomen is soft.      Tenderness: There is no abdominal tenderness.   Musculoskeletal:         General: Normal range of motion.      Comments: Left arm AVF   Neurological:      Mental Status: He is alert and oriented to person, place, and time.   Psychiatric:         Mood and Affect: Mood and affect normal.         Behavior: Behavior normal.         Significant Labs: All pertinent labs within the  past 24 hours have been reviewed.    Significant Imaging: I have reviewed all pertinent imaging results/findings within the past 24 hours.

## 2022-02-14 NOTE — PLAN OF CARE
Problem: Occupational Therapy Goal  Goal: Occupational Therapy Goal  Description: Goals to be met by:  2/28/2022    Patient will increase functional independence with ADLs by performing:    Feeding with [place].  UE Dressing with [place].  LE Dressing with Modified [place].  Grooming while standing at sink with [place].  Toileting from toilet with [place] for hygiene and clothing management.   Bathing from  shower chair/bench with Modified [place].  Toilet transfer to toilet with Modified [place].    Outcome: Ongoing, Progressing     Evaluation complete-see note for details. Goals and POC established.

## 2022-02-14 NOTE — PT/OT/SLP EVAL
Occupational Therapy  Co- Evaluation and Treatment w/ PT    Name: Martin Hendrix Jr.  MRN: [id number]  Admitting Diagnosis:  COVID-19    Length of Stay: 2 days         Recommendations:     Discharge Recommendations: home health OT  Discharge Equipment Recommendations:  none  Barriers to discharge:  None    Plan:     Patient to be seen 2 x/week to address the above listed problems via self-care/home management,therapeutic activities,therapeutic exercises  · Plan of Care Expires: 03/13/22  · Plan of Care Reviewed with: patient    Assessment:     Martin Hendrix Jr. is a 61 y.o. male with a medical diagnosis of COVID-19.  He presents with the following performance deficits affecting function: weakness,impaired endurance,impaired self care skills,impaired functional mobilty,gait instability,impaired balance,decreased lower extremity function,impaired cardiopulmonary response to activity,decreased coordination.  Pt would benefit from skilled OT services in order to maximize independence with ADLs and facilitate safe discharge. Pt would benefit from home health OT once medically stable for discharge.     Pt O2 sat goal >92%. Pt was 98% on Room Air prior to treatment and stayed between % with treatment.     Time spent evaluating pt, performing self-care and ambulation in room      Rehab Prognosis: Good; patient would benefit from acute skilled OT services to address these deficits and reach maximum level of function.       Subjective   Communicated with: EVELIO Triplett prior to session.  Patient found up in chair with telemetry,peripheral IV,pulse ox (continuous) upon OT entry to room.    Chief Complaint: No chief complaint on file.    Patient/Family Comments/goals: Be able to go home and take care of myself    Pain/Comfort:  · Pain Rating 1: 0/10    Patients cultural, spiritual, Syn[employer] conflicts given the current situation:      Occupational Profile:  Living Environment: lives alone in [facility] w/ threshold; t/s  "combo  Prior Level of Function: Patient reports being Independent with mobility & with ADLs.   Roles/Repsonsibilities:   Hand Dominance: right   Work: no.   Drive: yes.   Managing Medicines/Managing Home: yes.   Equipment Used at Home:  oxygen    Patient reports they will have assistance from able to call friends/family upon discharge.      Objective:     Patient found with: telemetry,peripheral IV,pulse ox (continuous)   General Precautions: airborne,contact,droplet,fall   Orthopedic Precautions:N/A   Braces: N/A   Oxygen Device: Room Air  Vitals: BP (!) 100/58 (BP Location: Right arm, Patient Position: Sitting)   Pulse 76   Temp 97.4 °F (36.3 °C) (Oral)   Resp 18   Ht 5' 11" (1.803 m)   Wt 94.3 kg (208 lb)   SpO2 98%   BMI 29.01 kg/m²     Cognitive and Psychosocial Function:   · AOx4 -- Person, Place, Time and Situation   · Follows Commands/attention:follows multi-step commands  · Communication:  clear/fluent  · Memory: No Deficits noted  · Safety awareness/insight to disability: intact     Hearing: Intact    Vision:  Intact visual fields    Physical Exam:     Left UE Right UE   UE Edema N/A N/A   UE ROM AROM: WFL AROM: WFL   UE Strength WFL WFL    Strength WFL WFL   Sensation normal normal   Fine Motor Coordination:  intact intact   Gross Motor Coordination: intact intact     Occupational Performance:  Bed Mobility:       Not observed d/t being UIC and returned to chair    Functional Mobility/Transfers:     Sit to Stand: stand by assistance using no assistive device    Pt ambulated 35' CGA with no AD to simulate household and/or community distances in order to maximize functional activity tolerance and dynamic standing balance required for engagement in occupations of choice.   o LOB: Yes - posterior LOB during ambulation; 2 LOB w/ grooming task at sink (did not req physical assistance to recover)  o SOB: yes w/ increased standing  o Dizziness: No    Activities of Daily Living:     Grooming: " contact guard assistance to perform oral hygiene standing at sink   Lower Body Dressing: stand by assistance to don/doff tennis shoes in bedside chair    AMPA 6 Click ADL:  AMPA Total Score: 21    Treatment & Education:   Therapist provided facilitation and instruction of proper body mechanics, energy conservation, and fall prevention strategies during tasks listed above.   Pt educated on role of OT, POC and goals for therapy   Pt educated on importance of OOB activities with staff member assistance and sitting OOB majority of the day.    Updated communication board with level of assist required (CGA) & educated RN/patient that pt is appropriate for transfers and mobility with RN/PCT.     Additional staff present: PT for co-eval/tx due to patient's medical complexities requiring two skilled therapists in order to appropriately assess patient's functional deficits as well as ensure patient safety, accommodate for limited activity tolerance, and provide appropriate, skilled assistance to maximize functional potential during evaluation.    Patient left up in chair with all lines intact, call button in reach and RN notified    GOALS:   Multidisciplinary Problems     Occupational Therapy Goals        Problem: Occupational Therapy Goal    Goal Priority Disciplines Outcome Interventions   Occupational Therapy Goal     OT, PT/OT Ongoing, Progressing    Description: Goals to be met by:  2/28/2022    Patient will increase functional independence with ADLs by performing:    Feeding with [place].  UE Dressing with [place].  LE Dressing with Modified [place].  Grooming while standing at sink with [place].  Toileting from toilet with [place] for hygiene and clothing management.   Bathing from  shower chair/bench with Modified [place].  Toilet transfer to toilet with Modified [place].                     History:     Past Medical History:   Diagnosis Date    Acute rejection of lung  transplant 11/3/2017    AVILA (acute kidney injury) 8/27/2017    Atrial fibrillation 8/23/2017    CKD (chronic kidney disease) stage 3, GFR 30-59 ml/min     Dr. Peacock    DM (diabetes mellitus) 11/2017     02/22/2021 Insulin x 2017    Hypertension     On home oxygen therapy     KRISTYN on CPAP     Osteopenia     Pancreatitis 2009    hospitalized    Pulmonary hypertension     Pure hypercholesterolemia 11/15/2017    Sarcoidosis     Shortness of breath     Type 2 diabetes mellitus 11/5/2017       Past Surgical History:   Procedure Laterality Date    ABDOMINAL SURGERY      6 weeks old    AV FISTULA PLACEMENT Left 10/13/2021    Procedure: CREATION, AV FISTULA;  Surgeon: CARLI Thomason II, MD;  Location: [facility] OR 2ND FLR;  Service: Vascular;  Laterality: Left;    BRONCHOSCOPY      BRONCHOSCOPY N/A 8/22/2018    Procedure: flexible bronchoscopy with tissue biopsy CPT 07289;  Surgeon: [facility] Diagnostic Provider;  Location: [facility] OR 2ND FLR;  Service: Endoscopy;  Laterality: N/A;    BRONCHOSCOPY N/A 11/20/2018    Procedure: flexible bronchoscopy with tissue biopy CPT 32513;  Surgeon: [facility] Diagnostic Provider;  Location: [facility] OR [facility] FLR;  Service: Anesthesiology;  Laterality: N/A;    BRONCHOSCOPY N/A 11/10/2021    Procedure: Flexible bronchoscopy;  Surgeon: Samantha Bill DO;  Location: [facility] OR 2ND FLR;  Service: Endoscopy;  Laterality: N/A;    CHEST TUBE INSERTION      CHOLECYSTECTOMY      COLONOSCOPY      ESOPHAGOGASTRODUODENOSCOPY N/A 8/6/2018    Procedure: EGD (ESOPHAGOGASTRODUODENOSCOPY);  Surgeon: Ramu Eisenberg MD;  Location: [facility] ENDO (2ND FLR);  Service: Endoscopy;  Laterality: N/A;  off pepcid and all acid reducers for 2 weeks; PA > 50    FISTULOGRAM Left 1/10/2022    Procedure: Fistulogram;  Surgeon: CARLI Thomason II, MD;  Location: [facility] CATH LAB;  Service: Vascular;  Laterality: Left;    INSERTION OF TUNNELED CENTRAL VENOUS HEMODIALYSIS CATHETER N/A 3/13/2019    Procedure:  INSERTION, CATHETER, CENTRAL VENOUS, HEMODIALYSIS, TUNNELED;  Surgeon: Edy Gonzalez MD;  Location: [facility] CATH LAB;  Service: Nephrology;  Laterality: N/A;    INSERTION OF TUNNELED CENTRAL VENOUS HEMODIALYSIS CATHETER Right 5/7/2021    Procedure: Insertion, Catheter, Central Venous, Hemodialysis;  Surgeon: Mary Joshi MD;  Location: [facility] CATH LAB;  Service: Interventional Nephrology;  Laterality: Right;    LUNG BIOPSY      LUNG TRANSPLANT  08/2017    PERCUTANEOUS TRANSLUMINAL ANGIOPLASTY OF ARTERIOVENOUS FISTULA N/A 1/10/2022    Procedure: PTA, AV FISTULA;  Surgeon: CARLI Thomason II, MD;  Location: [facility] CATH LAB;  Service: Vascular;  Laterality: N/A;    REMOVAL OF TUNNELED CENTRAL VENOUS CATHETER (CVC) N/A 5/16/2019    Procedure: REMOVAL, CATHETER, CENTRAL VENOUS, TUNNELED;  Surgeon: Edy Gonzalez MD;  Location: [facility] CATH LAB;  Service: Nephrology;  Laterality: N/A;    REVISION OF ARTERIOVENOUS FISTULA Left 11/18/2021    Procedure: REVISION, AV FISTULA;  Surgeon: CARLI Thomason II, MD;  Location: [facility] OR [address] [place] [address];  Service: Vascular;  Laterality: Left;  Ligation of side branches    TONSILLECTOMY         Time Tracking:       OT Date of Treatment: 02/14/22  OT Start Time: 0953  OT Stop Time: 1011  OT Total Time (min): 18 min    Additional staff present: PT     Billable Minutes:  Evaluation 10  Self Care/Home Management 8      Brionna (Maty), OTR/L  [phone] (Pager #)  2/14/2022

## 2022-02-15 LAB
ALBUMIN SERPL BCP-MCNC: 2.6 G/DL (ref 3.5–5.2)
ALP SERPL-CCNC: 59 U/L (ref 55–135)
ALT SERPL W/O P-5'-P-CCNC: 10 U/L (ref 10–44)
ANION GAP SERPL CALC-SCNC: 21 MMOL/L (ref 8–16)
AST SERPL-CCNC: 14 U/L (ref 10–40)
BASOPHILS # BLD AUTO: 0.01 K/UL (ref 0–0.2)
BASOPHILS NFR BLD: 0.1 % (ref 0–1.9)
BILIRUB SERPL-MCNC: 0.4 MG/DL (ref 0.1–1)
BUN SERPL-MCNC: 126 MG/DL (ref 6–20)
CALCIUM SERPL-MCNC: 8.6 MG/DL (ref 8.7–10.5)
CHLORIDE SERPL-SCNC: 91 MMOL/L (ref 95–110)
CO2 SERPL-SCNC: 19 MMOL/L (ref 23–29)
CREAT SERPL-MCNC: 14.5 MG/DL (ref 0.5–1.4)
D DIMER PPP IA.FEU-MCNC: 1.69 MG/L FEU
DIFFERENTIAL METHOD: ABNORMAL
EOSINOPHIL # BLD AUTO: 0 K/UL (ref 0–0.5)
EOSINOPHIL NFR BLD: 0 % (ref 0–8)
ERYTHROCYTE [DISTWIDTH] IN BLOOD BY AUTOMATED COUNT: 12.9 % (ref 11.5–14.5)
EST. GFR  (AFRICAN AMERICAN): 3.7 ML/MIN/1.73 M^2
EST. GFR  (NON AFRICAN AMERICAN): 3.2 ML/MIN/1.73 M^2
EVEROLIMUS BLD-MCNC: 1.3 NG/ML
FACT X PPP CHRO-ACNC: 1.21 IU/ML (ref 0.3–0.7)
FERRITIN SERPL-MCNC: 3625 NG/ML (ref 20–300)
GLUCOSE SERPL-MCNC: 153 MG/DL (ref 70–110)
HCT VFR BLD AUTO: 29.1 % (ref 40–54)
HGB BLD-MCNC: 9.6 G/DL (ref 14–18)
IMM GRANULOCYTES # BLD AUTO: 0.15 K/UL (ref 0–0.04)
IMM GRANULOCYTES NFR BLD AUTO: 1.3 % (ref 0–0.5)
LDH SERPL L TO P-CCNC: 271 U/L (ref 110–260)
LYMPHOCYTES # BLD AUTO: 1.1 K/UL (ref 1–4.8)
LYMPHOCYTES NFR BLD: 9.6 % (ref 18–48)
MAGNESIUM SERPL-MCNC: 2.2 MG/DL (ref 1.6–2.6)
MCH RBC QN AUTO: 29 PG (ref 27–31)
MCHC RBC AUTO-ENTMCNC: 33 G/DL (ref 32–36)
MCV RBC AUTO: 88 FL (ref 82–98)
MONOCYTES # BLD AUTO: 0.7 K/UL (ref 0.3–1)
MONOCYTES NFR BLD: 6.5 % (ref 4–15)
NEUTROPHILS # BLD AUTO: 9.2 K/UL (ref 1.8–7.7)
NEUTROPHILS NFR BLD: 82.5 % (ref 38–73)
NRBC BLD-RTO: 0 /100 WBC
PHOSPHATE SERPL-MCNC: 10.7 MG/DL (ref 2.7–4.5)
PLATELET # BLD AUTO: 375 K/UL (ref 150–450)
PMV BLD AUTO: 9.4 FL (ref 9.2–12.9)
POCT GLUCOSE: 111 MG/DL (ref 70–110)
POCT GLUCOSE: 127 MG/DL (ref 70–110)
POCT GLUCOSE: 155 MG/DL (ref 70–110)
POCT GLUCOSE: 160 MG/DL (ref 70–110)
POCT GLUCOSE: 164 MG/DL (ref 70–110)
POTASSIUM SERPL-SCNC: 4.5 MMOL/L (ref 3.5–5.1)
PROT SERPL-MCNC: 5.8 G/DL (ref 6–8.4)
RBC # BLD AUTO: 3.31 M/UL (ref 4.6–6.2)
SODIUM SERPL-SCNC: 131 MMOL/L (ref 136–145)
TACROLIMUS BLD-MCNC: 18.4 NG/ML (ref 5–15)
WBC # BLD AUTO: 11.16 K/UL (ref 3.9–12.7)

## 2022-02-15 PROCEDURE — 85379 FIBRIN DEGRADATION QUANT: CPT | Performed by: STUDENT IN AN ORGANIZED HEALTH CARE EDUCATION/TRAINING PROGRAM

## 2022-02-15 PROCEDURE — 25000003 PHARM REV CODE 250: Performed by: STUDENT IN AN ORGANIZED HEALTH CARE EDUCATION/TRAINING PROGRAM

## 2022-02-15 PROCEDURE — 80197 ASSAY OF TACROLIMUS: CPT | Performed by: STUDENT IN AN ORGANIZED HEALTH CARE EDUCATION/TRAINING PROGRAM

## 2022-02-15 PROCEDURE — 99233 SBSQ HOSP IP/OBS HIGH 50: CPT | Mod: CR,,, | Performed by: NURSE PRACTITIONER

## 2022-02-15 PROCEDURE — 80053 COMPREHEN METABOLIC PANEL: CPT | Performed by: STUDENT IN AN ORGANIZED HEALTH CARE EDUCATION/TRAINING PROGRAM

## 2022-02-15 PROCEDURE — 63600175 PHARM REV CODE 636 W HCPCS: Performed by: STUDENT IN AN ORGANIZED HEALTH CARE EDUCATION/TRAINING PROGRAM

## 2022-02-15 PROCEDURE — 83615 LACTATE (LD) (LDH) ENZYME: CPT | Performed by: STUDENT IN AN ORGANIZED HEALTH CARE EDUCATION/TRAINING PROGRAM

## 2022-02-15 PROCEDURE — 36415 COLL VENOUS BLD VENIPUNCTURE: CPT | Performed by: STUDENT IN AN ORGANIZED HEALTH CARE EDUCATION/TRAINING PROGRAM

## 2022-02-15 PROCEDURE — 87205 SMEAR GRAM STAIN: CPT | Performed by: STUDENT IN AN ORGANIZED HEALTH CARE EDUCATION/TRAINING PROGRAM

## 2022-02-15 PROCEDURE — 85025 COMPLETE CBC W/AUTO DIFF WBC: CPT | Performed by: STUDENT IN AN ORGANIZED HEALTH CARE EDUCATION/TRAINING PROGRAM

## 2022-02-15 PROCEDURE — 94761 N-INVAS EAR/PLS OXIMETRY MLT: CPT

## 2022-02-15 PROCEDURE — 99233 SBSQ HOSP IP/OBS HIGH 50: CPT | Mod: GC,,, | Performed by: STUDENT IN AN ORGANIZED HEALTH CARE EDUCATION/TRAINING PROGRAM

## 2022-02-15 PROCEDURE — 99233 PR SUBSEQUENT HOSPITAL CARE,LEVL III: ICD-10-PCS | Mod: GC,,, | Performed by: STUDENT IN AN ORGANIZED HEALTH CARE EDUCATION/TRAINING PROGRAM

## 2022-02-15 PROCEDURE — 12000002 HC ACUTE/MED SURGE SEMI-PRIVATE ROOM

## 2022-02-15 PROCEDURE — 94640 AIRWAY INHALATION TREATMENT: CPT

## 2022-02-15 PROCEDURE — 85520 HEPARIN ASSAY: CPT | Performed by: STUDENT IN AN ORGANIZED HEALTH CARE EDUCATION/TRAINING PROGRAM

## 2022-02-15 PROCEDURE — 99233 PR SUBSEQUENT HOSPITAL CARE,LEVL III: ICD-10-PCS | Mod: CR,,, | Performed by: NURSE PRACTITIONER

## 2022-02-15 PROCEDURE — 99232 PR SUBSEQUENT HOSPITAL CARE,LEVL II: ICD-10-PCS | Mod: GC,,, | Performed by: INTERNAL MEDICINE

## 2022-02-15 PROCEDURE — 99232 SBSQ HOSP IP/OBS MODERATE 35: CPT | Mod: ,,, | Performed by: PHYSICIAN ASSISTANT

## 2022-02-15 PROCEDURE — 84100 ASSAY OF PHOSPHORUS: CPT | Performed by: STUDENT IN AN ORGANIZED HEALTH CARE EDUCATION/TRAINING PROGRAM

## 2022-02-15 PROCEDURE — 94664 DEMO&/EVAL PT USE INHALER: CPT

## 2022-02-15 PROCEDURE — 99232 PR SUBSEQUENT HOSPITAL CARE,LEVL II: ICD-10-PCS | Mod: ,,, | Performed by: PHYSICIAN ASSISTANT

## 2022-02-15 PROCEDURE — 94799 UNLISTED PULMONARY SVC/PX: CPT

## 2022-02-15 PROCEDURE — 99900035 HC TECH TIME PER 15 MIN (STAT)

## 2022-02-15 PROCEDURE — 82728 ASSAY OF FERRITIN: CPT | Performed by: STUDENT IN AN ORGANIZED HEALTH CARE EDUCATION/TRAINING PROGRAM

## 2022-02-15 PROCEDURE — 83735 ASSAY OF MAGNESIUM: CPT | Performed by: STUDENT IN AN ORGANIZED HEALTH CARE EDUCATION/TRAINING PROGRAM

## 2022-02-15 PROCEDURE — 87070 CULTURE OTHR SPECIMN AEROBIC: CPT | Performed by: STUDENT IN AN ORGANIZED HEALTH CARE EDUCATION/TRAINING PROGRAM

## 2022-02-15 PROCEDURE — 99232 SBSQ HOSP IP/OBS MODERATE 35: CPT | Mod: GC,,, | Performed by: INTERNAL MEDICINE

## 2022-02-15 PROCEDURE — 27000207 HC ISOLATION

## 2022-02-15 RX ORDER — AZITHROMYCIN 250 MG/1
500 TABLET, FILM COATED ORAL
Qty: 30 TABLET | Refills: 11 | Status: SHIPPED | OUTPATIENT
Start: 2022-02-16 | End: 2022-04-04 | Stop reason: SDUPTHER

## 2022-02-15 RX ORDER — TACROLIMUS 0.5 MG/1
3 CAPSULE ORAL EVERY 12 HOURS
Qty: 360 CAPSULE | Refills: 11 | Status: SHIPPED | OUTPATIENT
Start: 2022-02-15 | End: 2022-02-17 | Stop reason: SDUPTHER

## 2022-02-15 RX ADMIN — CLONIDINE HYDROCHLORIDE 0.1 MG: 0.1 TABLET ORAL at 03:02

## 2022-02-15 RX ADMIN — CARVEDILOL 3.12 MG: 3.12 TABLET, FILM COATED ORAL at 08:02

## 2022-02-15 RX ADMIN — INSULIN ASPART 10 UNITS: 100 INJECTION, SOLUTION INTRAVENOUS; SUBCUTANEOUS at 05:02

## 2022-02-15 RX ADMIN — VALSARTAN 160 MG: 160 TABLET, FILM COATED ORAL at 08:02

## 2022-02-15 RX ADMIN — ALBUTEROL SULFATE 2 PUFF: 108 INHALANT RESPIRATORY (INHALATION) at 01:02

## 2022-02-15 RX ADMIN — OXYCODONE HYDROCHLORIDE AND ACETAMINOPHEN 500 MG: 500 TABLET ORAL at 10:02

## 2022-02-15 RX ADMIN — SEVELAMER CARBONATE 1600 MG: 800 TABLET, FILM COATED ORAL at 12:02

## 2022-02-15 RX ADMIN — AMLODIPINE BESYLATE 10 MG: 10 TABLET ORAL at 08:02

## 2022-02-15 RX ADMIN — INSULIN ASPART 10 UNITS: 100 INJECTION, SOLUTION INTRAVENOUS; SUBCUTANEOUS at 12:02

## 2022-02-15 RX ADMIN — SODIUM BICARBONATE 650 MG TABLET 650 MG: at 10:02

## 2022-02-15 RX ADMIN — CLONIDINE HYDROCHLORIDE 0.1 MG: 0.1 TABLET ORAL at 10:02

## 2022-02-15 RX ADMIN — VALSARTAN 160 MG: 160 TABLET, FILM COATED ORAL at 10:02

## 2022-02-15 RX ADMIN — MUPIROCIN: 20 OINTMENT TOPICAL at 10:02

## 2022-02-15 RX ADMIN — REMDESIVIR 100 MG: 100 INJECTION, POWDER, LYOPHILIZED, FOR SOLUTION INTRAVENOUS at 08:02

## 2022-02-15 RX ADMIN — EVEROLIMUS 0.75 MG: 0.75 TABLET ORAL at 05:02

## 2022-02-15 RX ADMIN — ALBUTEROL SULFATE 2 PUFF: 108 INHALANT RESPIRATORY (INHALATION) at 07:02

## 2022-02-15 RX ADMIN — INSULIN DETEMIR 26 UNITS: 100 INJECTION, SOLUTION SUBCUTANEOUS at 08:02

## 2022-02-15 RX ADMIN — FOLIC ACID 1000 MCG: 1 TABLET ORAL at 08:02

## 2022-02-15 RX ADMIN — CLONIDINE HYDROCHLORIDE 0.1 MG: 0.1 TABLET ORAL at 08:02

## 2022-02-15 RX ADMIN — INSULIN ASPART 10 UNITS: 100 INJECTION, SOLUTION INTRAVENOUS; SUBCUTANEOUS at 08:02

## 2022-02-15 RX ADMIN — APIXABAN 5 MG: 5 TABLET, FILM COATED ORAL at 10:02

## 2022-02-15 RX ADMIN — DEXAMETHASONE 6 MG: 4 TABLET ORAL at 08:02

## 2022-02-15 RX ADMIN — INSULIN ASPART 2 UNITS: 100 INJECTION, SOLUTION INTRAVENOUS; SUBCUTANEOUS at 12:02

## 2022-02-15 RX ADMIN — SODIUM BICARBONATE 650 MG TABLET 650 MG: at 08:02

## 2022-02-15 RX ADMIN — CARVEDILOL 3.12 MG: 3.12 TABLET, FILM COATED ORAL at 10:02

## 2022-02-15 RX ADMIN — SEVELAMER CARBONATE 1600 MG: 800 TABLET, FILM COATED ORAL at 05:02

## 2022-02-15 RX ADMIN — EVEROLIMUS 0.75 MG: 0.75 TABLET ORAL at 08:02

## 2022-02-15 RX ADMIN — INSULIN DETEMIR 26 UNITS: 100 INJECTION, SOLUTION SUBCUTANEOUS at 10:02

## 2022-02-15 RX ADMIN — OXYCODONE HYDROCHLORIDE AND ACETAMINOPHEN 500 MG: 500 TABLET ORAL at 08:02

## 2022-02-15 RX ADMIN — SEVELAMER CARBONATE 1600 MG: 800 TABLET, FILM COATED ORAL at 08:02

## 2022-02-15 RX ADMIN — INSULIN ASPART 2 UNITS: 100 INJECTION, SOLUTION INTRAVENOUS; SUBCUTANEOUS at 08:02

## 2022-02-15 RX ADMIN — THERA TABS 1 TABLET: TAB at 08:02

## 2022-02-15 RX ADMIN — PANTOPRAZOLE SODIUM 40 MG: 40 TABLET, DELAYED RELEASE ORAL at 08:02

## 2022-02-15 RX ADMIN — ALBUTEROL SULFATE 2 PUFF: 108 INHALANT RESPIRATORY (INHALATION) at 05:02

## 2022-02-15 RX ADMIN — ATORVASTATIN CALCIUM 40 MG: 20 TABLET, FILM COATED ORAL at 08:02

## 2022-02-15 NOTE — PROGRESS NOTES
[facility] - Intensive Care ([place] [address])  Infectious Disease  Progress Note    Patient Name: Martin Hendrix Jr.  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 Suspect fluid overload in setting of viral PNA--- CMV Neg, no more diarrhea.     Recommendations    1. Continue to monitor off Abx.  2. Continue Rem/Dexa per COVID-19 protocol.  3. Close monitoring of his AVF site and previous IV lines.         Thank you for your consult. I will sign off. Please contact us if you have any additional questions.    Beto Carballo MD  Infectious Disease  [facility] - Intensive Care ([place] [address])    Subjective:     Principal Problem:COVID-19    HPI: Martin Hendrix Jr. 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 Negative for appetite change, chills and fever.   HENT: Negative for congestion.    Eyes: Negative for pain and itching.   Respiratory: Negative for cough, chest tightness and shortness of breath.    Cardiovascular: Negative for palpitations and leg swelling.   Gastrointestinal: Negative for abdominal pain and nausea.   Endocrine: Negative for polyphagia and polyuria.   Genitourinary: Negative for dysuria and frequency.   Musculoskeletal: Negative for back pain.   Neurological: Negative for numbness and headaches.   Psychiatric/Behavioral: Negative for agitation and behavioral problems.     Objective:     Vital Signs (Most Recent):  Temp: 97.5 °F (36.4 °C) (02/15/22 0745)  Pulse: 78 (02/15/22 0753)  Resp: 18 (02/15/22 0745)  BP: (!) 140/72 (02/15/22 0745)  SpO2: 95 % (02/15/22 0745) Vital Signs (24h Range):  Temp:  [97.4 °F (36.3 °C)-98.5 °F (36.9 °C)] 97.5 °F (36.4 °C)  Pulse:  [74-86] 78  Resp:  [16-20] 18  SpO2:  [95 %-99 %] 95 %  BP: (100-140)/(58-88) 140/72     Weight: 94.3 kg (208 lb)  Body mass index is 29.01 kg/m².    Estimated Creatinine Clearance: 7.5 mL/min (A) (based on SCr of 12.5 mg/dL (H)).    Physical Exam  Constitutional:       Appearance: He is well-developed.   HENT:      Head: Normocephalic.   Cardiovascular:      Rate and Rhythm: Normal rate.   Pulmonary:      Effort: Pulmonary effort is normal.   Abdominal:      General: Bowel sounds are normal. There is no distension.      Palpations: Abdomen is soft.      Tenderness: There is no abdominal tenderness.   Musculoskeletal:         General: Normal range of motion.      Comments: Left arm AVF   Neurological:      Mental Status: He is alert and oriented to person, place, and time.   Psychiatric:         Behavior: Behavior normal.         Significant Labs: All pertinent labs within the past 24 hours have been reviewed.    Significant Imaging: I  have reviewed all pertinent imaging results/findings within the past 24 hours.

## 2022-02-15 NOTE — PLAN OF CARE
Pt received in bed alert and verbal. Able to make needs known. Pt denies N/V/D or pain. Pt educated on diet and BS control. Fall and safety precautions maintained. Call light in reach.

## 2022-02-15 NOTE — PROGRESS NOTES
[facility] ([place] [id number])  Lung Transplant  Progress Note - Floor    Patient Name: Martin Hendrix Jr.  MRN: [id number]  Admission Date: 2/12/2022  Hospital Length of Stay: 3 days  Post-Operative Day: 1638  Attending Physician: Mike Cordoba MD  Primary Care Provider: Michel Henley MD     Subjective:     Interval History: No acute events overnight. Fatigue and GI symptoms improved since admission. On room air.     Continuous Infusions:  Scheduled Meds:   sodium chloride 0.9%   Intravenous Once    albuterol  2 puff Inhalation Q6H    amLODIPine  10 mg Oral Daily    ascorbic acid (vitamin C)  500 mg Oral BID    atorvastatin  40 mg Oral Daily    carvediloL  3.125 mg Oral BID    cloNIDine  0.1 mg Oral TID    dexAMETHasone  6 mg Oral Daily    everolimus (immunosuppressive)  0.75 mg Oral Q12H    folic acid  1,000 mcg Oral Daily    insulin aspart U-100  10 Units Subcutaneous TIDWM    insulin detemir U-100  26 Units Subcutaneous BID    multivitamin  1 tablet Oral Daily    mupirocin   Nasal BID    pantoprazole  40 mg Oral Daily    remdesivir infusion  100 mg Intravenous Daily    sevelamer carbonate  1,600 mg Oral TID WM    sodium bicarbonate  650 mg Oral BID    valsartan  160 mg Oral BID     PRN Meds:dextrose 10%, dextrose 10%, glucagon (human recombinant), glucose, glucose, guaiFENesin, insulin aspart U-100, naloxone, sodium chloride 0.9%, sodium chloride 0.9%, sodium chloride 0.9%    Review of patient's allergies indicates:  No Known Allergies    Review of Systems   Constitutional: Positive for fatigue. Negative for fever.   HENT: Negative.    Eyes: Negative.    Respiratory: Positive for cough and shortness of breath.    Cardiovascular: Negative.  Negative for chest pain and leg swelling.   Gastrointestinal: Positive for diarrhea.   Endocrine: Negative.    Genitourinary: Negative.    Musculoskeletal: Negative.  Negative for arthralgias.   Skin: Negative.    Allergic/Immunologic:  Negative.    Neurological: Negative.    Hematological: Negative.    Psychiatric/Behavioral: Negative.  The patient is not nervous/anxious.      Objective:   Physical Exam  Vitals and nursing note reviewed.   Constitutional:       Appearance: Normal appearance. He is well-developed.   HENT:      Head: Normocephalic and atraumatic.      Nose: No congestion or rhinorrhea.   Eyes:      Extraocular Movements: Extraocular movements intact.      Conjunctiva/sclera: Conjunctivae normal.   Neck:      Trachea: No tracheal deviation.   Cardiovascular:      Rate and Rhythm: Normal rate and regular rhythm.      Heart sounds: No murmur heard.      Pulmonary:      Effort: Pulmonary effort is normal.      Breath sounds: Rales (at right base) present. No wheezing.   Abdominal:      General: Bowel sounds are normal. There is no distension.      Palpations: Abdomen is soft.      Tenderness: There is no abdominal tenderness.   Musculoskeletal:         General: No swelling.      Cervical back: Normal range of motion.      Right lower leg: Edema present.      Left lower leg: Edema present.   Skin:     General: Skin is warm and dry.      Capillary Refill: Capillary refill takes less than 2 seconds.   Neurological:      General: No focal deficit present.      Mental Status: He is alert and oriented to person, place, and time.   Psychiatric:         Mood and Affect: Mood normal.         Behavior: Behavior normal.           Vital Signs (Most Recent):  Temp: 96.4 °F (35.8 °C) (02/15/22 1142)  Pulse: 76 (02/15/22 1145)  Resp: 20 (02/15/22 1142)  BP: 125/60 (02/15/22 1145)  SpO2: 95 % (02/15/22 1145) Vital Signs (24h Range):  Temp:  [96.4 °F (35.8 °C)-98.5 °F (36.9 °C)] 96.4 °F (35.8 °C)  Pulse:  [74-86] 76  Resp:  [16-20] 20  SpO2:  [95 %-99 %] 95 %  BP: (116-140)/(60-88) 125/60     Weight: 94.3 kg (208 lb)  Body mass index is 29.01 kg/m².      Intake/Output Summary (Last 24 hours) at 2/15/2022 1315  Last data filed at 2/15/2022 0500  Gross  per 24 hour   Intake --   Output 100 ml   Net -100 ml       Significant Labs:  CBC:  Recent Labs   Lab 02/15/22  0856   WBC 11.16   RBC 3.31*   HGB 9.6*   HCT 29.1*      MCV 88   MCH 29.0   MCHC 33.0     BMP:  Recent Labs   Lab 02/15/22  0856   *   K 4.5   CL 91*   CO2 19*   *   CREATININE 14.5*   CALCIUM 8.6*      Tacrolimus Levels:  Recent Labs   Lab 02/15/22  0856   TACROLIMUS 18.4*     Microbiology:  Microbiology Results (last 7 days)     Procedure Component Value Units Date/Time    Blood culture (site 2) [[id number]] Collected: 02/13/22 0114    Order Status: Completed Specimen: Blood Updated: 02/15/22 0613     Blood Culture, Routine No Growth to date      No Growth to date      No Growth to date    Narrative:      Site # 2, aerobic only    Blood culture (site 1) [[id number]] Collected: 02/13/22 0113    Order Status: Completed Specimen: Blood Updated: 02/15/22 0613     Blood Culture, Routine No Growth to date      No Growth to date      No Growth to date    Narrative:      Site # 1, aerobic and anaerobic    Clostridium difficile EIA [[id number]]     Order Status: Canceled Specimen: Stool     Culture, Respiratory with Gram Stain [[id number]]     Order Status: No result Specimen: Respiratory           I have reviewed all pertinent labs within the past 24 hours.    Diagnostic Results:  Labs: Reviewed      Assessment/Plan:     * COVID-19  Agree with current plan:  Remdesivir x 5 days.  Dexamethasone x 10 days    Lung replaced by transplant  Patient is prescribed tacrolimus 3 mg BID, everolimus 0.75 mg daily, prednisone 5 mg daily.   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    Supratherapeutic tac levels 2/2 missed HD  Continue to hold tac for now  Continue everolimus  Discontinue prednisone while on dexamethasone.    Please resume when dexamethasone course completed.     Immunosuppression  Hold tacrolimus, will resume once appropriate. Needs HD. Continue everolimus. 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 Bactrim held, resume upon discharge    Type 2 diabetes mellitus with hyperglycemia, with long-term current use of insulin  Patient takes Tresiba 20 units qhs and Aspart 10units tid plus sliding scale at home.  Expect suboptimally controlled blood glucose with infection and addition of dexamethasone.  Beta-Hydroxy is WNL.  Defer management to primary team    ESRD (end stage renal disease)  Dialysis per nephro recommendations.        Teresa Trejo PA-C  Lung Transplant  [facility] ([facility]

## 2022-02-15 NOTE — SUBJECTIVE & OBJECTIVE
Interval History: no acute events    Review of Systems   Constitutional: Positive for activity change. Negative for appetite change, chills and fever.   HENT: Negative for congestion.    Eyes: Negative for pain and itching.   Respiratory: Negative for cough, chest tightness and shortness of breath.    Cardiovascular: Negative for palpitations and leg swelling.   Gastrointestinal: Negative for abdominal pain and nausea.   Endocrine: Negative for polyphagia and polyuria.   Genitourinary: Negative for dysuria and frequency.   Musculoskeletal: Negative for back pain.   Neurological: Negative for numbness and headaches.   Psychiatric/Behavioral: Negative for agitation and behavioral problems.     Objective:     Vital Signs (Most Recent):  Temp: 97.5 °F (36.4 °C) (02/15/22 0745)  Pulse: 78 (02/15/22 0753)  Resp: 18 (02/15/22 0745)  BP: (!) 140/72 (02/15/22 0745)  SpO2: 95 % (02/15/22 0745) Vital Signs (24h Range):  Temp:  [97.4 °F (36.3 °C)-98.5 °F (36.9 °C)] 97.5 °F (36.4 °C)  Pulse:  [74-86] 78  Resp:  [16-20] 18  SpO2:  [95 %-99 %] 95 %  BP: (100-140)/(58-88) 140/72     Weight: 94.3 kg (208 lb)  Body mass index is 29.01 kg/m².    Estimated Creatinine Clearance: 7.5 mL/min (A) (based on SCr of 12.5 mg/dL (H)).    Physical Exam  Constitutional:       Appearance: He is well-developed.   HENT:      Head: Normocephalic.   Cardiovascular:      Rate and Rhythm: Normal rate.   Pulmonary:      Effort: Pulmonary effort is normal.   Abdominal:      General: Bowel sounds are normal. There is no distension.      Palpations: Abdomen is soft.      Tenderness: There is no abdominal tenderness.   Musculoskeletal:         General: Normal range of motion.      Comments: Left arm AVF   Neurological:      Mental Status: He is alert and oriented to person, place, and time.   Psychiatric:         Behavior: Behavior normal.         Significant Labs: All pertinent labs within the past 24 hours have been reviewed.    Significant Imaging: I have  reviewed all pertinent imaging results/findings within the past 24 hours.

## 2022-02-15 NOTE — ASSESSMENT & PLAN NOTE
Hold tacrolimus, will resume once appropriate. Needs HD. Continue everolimus. Off MMF due to history of resistant CMV viremia

## 2022-02-15 NOTE — PROGRESS NOTES
Home Oxygen Evaluation    Date Performed: 2/15/2022    1) Patient's Home O2 Sat on room air, while at rest: 96%        If O2 sats on room air at rest are 88% or below, patient qualifies. No additional testing needed. Document N/A in steps 2 and 3. If 89% or above, complete steps 2.      2) Patient's O2 Sat on room air while exercisin%      If O2 sats on room air while exercising remain 89% or above patient does not qualify, no further testing needed Document N/A in step 3. If O2 sats on room air while exercising are 88% or below, continue to step 3.      3) Patient's O2 Sat while exercising on O2: 93%  at 2 LPM         (Must show improvement from #2 for patients to qualify)    If O2 sats improve on oxygen, patient qualifies for portable oxygen. If not, the patient does not qualify.

## 2022-02-15 NOTE — NURSING
Pt resting in bedside chair or room air. Denies pain, discomfort, N/V/D, anxiety or depression. Pt states he does get SOB on exertion. Vitals signs wnl. Blood glucose trending down. Pt did get SOB during 6 minute walk test and required o2 (see notes) but was able to be weaned once seated. Pt has no other concerns at this time.

## 2022-02-15 NOTE — ASSESSMENT & PLAN NOTE
Patient status post lung transplant x2  [place] [facility]  Patient is on home Prograf and everolimus    - HOLD home Prograf and continue everolimus  - continue weaning oxygen as tolerated  - transplant Pulm consulted  - ID consulted

## 2022-02-15 NOTE — SUBJECTIVE & OBJECTIVE
Subjective:     Interval History: No acute events overnight. Fatigue and GI symptoms improved since admission. On room air.     Continuous Infusions:  Scheduled Meds:   sodium chloride 0.9%   Intravenous Once    albuterol  2 puff Inhalation Q6H    amLODIPine  10 mg Oral Daily    ascorbic acid (vitamin C)  500 mg Oral BID    atorvastatin  40 mg Oral Daily    carvediloL  3.125 mg Oral BID    cloNIDine  0.1 mg Oral TID    dexAMETHasone  6 mg Oral Daily    everolimus (immunosuppressive)  0.75 mg Oral Q12H    folic acid  1,000 mcg Oral Daily    insulin aspart U-100  10 Units Subcutaneous TIDWM    insulin detemir U-100  26 Units Subcutaneous BID    multivitamin  1 tablet Oral Daily    mupirocin   Nasal BID    pantoprazole  40 mg Oral Daily    remdesivir infusion  100 mg Intravenous Daily    sevelamer carbonate  1,600 mg Oral TID WM    sodium bicarbonate  650 mg Oral BID    valsartan  160 mg Oral BID     PRN Meds:dextrose 10%, dextrose 10%, glucagon (human recombinant), glucose, glucose, guaiFENesin, insulin aspart U-100, naloxone, sodium chloride 0.9%, sodium chloride 0.9%, sodium chloride 0.9%    Review of patient's allergies indicates:  No Known Allergies    Review of Systems   Constitutional: Positive for fatigue. Negative for fever.   HENT: Negative.    Eyes: Negative.    Respiratory: Positive for cough and shortness of breath.    Cardiovascular: Negative.  Negative for chest pain and leg swelling.   Gastrointestinal: Positive for diarrhea.   Endocrine: Negative.    Genitourinary: Negative.    Musculoskeletal: Negative.  Negative for arthralgias.   Skin: Negative.    Allergic/Immunologic: Negative.    Neurological: Negative.    Hematological: Negative.    Psychiatric/Behavioral: Negative.  The patient is not nervous/anxious.      Objective:   Physical Exam  Vitals and nursing note reviewed.   Constitutional:       Appearance: Normal appearance. He is well-developed.   HENT:      Head: Normocephalic  and atraumatic.      Nose: No congestion or rhinorrhea.   Eyes:      Extraocular Movements: Extraocular movements intact.      Conjunctiva/sclera: Conjunctivae normal.   Neck:      Trachea: No tracheal deviation.   Cardiovascular:      Rate and Rhythm: Normal rate and regular rhythm.      Heart sounds: No murmur heard.      Pulmonary:      Effort: Pulmonary effort is normal.      Breath sounds: Rales (at right base) present. No wheezing.   Abdominal:      General: Bowel sounds are normal. There is no distension.      Palpations: Abdomen is soft.      Tenderness: There is no abdominal tenderness.   Musculoskeletal:         General: No swelling.      Cervical back: Normal range of motion.      Right lower leg: Edema present.      Left lower leg: Edema present.   Skin:     General: Skin is warm and dry.      Capillary Refill: Capillary refill takes less than 2 seconds.   Neurological:      General: No focal deficit present.      Mental Status: He is alert and oriented to person, place, and time.   Psychiatric:         Mood and Affect: Mood normal.         Behavior: Behavior normal.           Vital Signs (Most Recent):  Temp: 96.4 °F (35.8 °C) (02/15/22 1142)  Pulse: 76 (02/15/22 1145)  Resp: 20 (02/15/22 1142)  BP: 125/60 (02/15/22 1145)  SpO2: 95 % (02/15/22 1145) Vital Signs (24h Range):  Temp:  [96.4 °F (35.8 °C)-98.5 °F (36.9 °C)] 96.4 °F (35.8 °C)  Pulse:  [74-86] 76  Resp:  [16-20] 20  SpO2:  [95 %-99 %] 95 %  BP: (116-140)/(60-88) 125/60     Weight: 94.3 kg (208 lb)  Body mass index is 29.01 kg/m².      Intake/Output Summary (Last 24 hours) at 2/15/2022 1315  Last data filed at 2/15/2022 0500  Gross per 24 hour   Intake --   Output 100 ml   Net -100 ml       Significant Labs:  CBC:  Recent Labs   Lab 02/15/22  0856   WBC 11.16   RBC 3.31*   HGB 9.6*   HCT 29.1*      MCV 88   MCH 29.0   MCHC 33.0     BMP:  Recent Labs   Lab 02/15/22  0856   *   K 4.5   CL 91*   CO2 19*   *   CREATININE 14.5*    CALCIUM 8.6*      Tacrolimus Levels:  Recent Labs   Lab 02/15/22  0856   TACROLIMUS 18.4*     Microbiology:  Microbiology Results (last 7 days)     Procedure Component Value Units Date/Time    Blood culture (site 2) [[id number]] Collected: 02/13/22 0114    Order Status: Completed Specimen: Blood Updated: 02/15/22 0613     Blood Culture, Routine No Growth to date      No Growth to date      No Growth to date    Narrative:      Site # 2, aerobic only    Blood culture (site 1) [[id number]] Collected: 02/13/22 0113    Order Status: Completed Specimen: Blood Updated: 02/15/22 0613     Blood Culture, Routine No Growth to date      No Growth to date      No Growth to date    Narrative:      Site # 1, aerobic and anaerobic    Clostridium difficile EIA [[id number]]     Order Status: Canceled Specimen: Stool     Culture, Respiratory with Gram Stain [[id number]]     Order Status: No result Specimen: Respiratory           I have reviewed all pertinent labs within the past 24 hours.    Diagnostic Results:  Labs: Reviewed

## 2022-02-15 NOTE — NURSING
Pt resting in room on room air. Vital signs wnl. Blood glucose trending down post insulin drip dc. Pt states he has minimal SOB on exertion, he denies polyuria, polydipsia, or polyphagia. Pt denies N/V, anxiety, or depression. Pt states he has discomfort and bruising in his right hand due to venipuncture and left arm at AV fistula site. Heat packs provided. Pt AOx4 and ambulatory with standby assist. Pt does not have questions or concerns at this time.

## 2022-02-15 NOTE — SUBJECTIVE & OBJECTIVE
Interval History:   NAEON. Appears comfortable on exam. Plans for HD today.     Review of patient's allergies indicates:  No Known Allergies  Current Facility-Administered Medications   Medication Frequency    0.9%  NaCl infusion Once    albuterol inhaler 2 puff Q6H    amLODIPine tablet 10 mg Daily    ascorbic acid (vitamin C) tablet 500 mg BID    atorvastatin tablet 40 mg Daily    carvediloL tablet 3.125 mg BID    cloNIDine tablet 0.1 mg TID    dexAMETHasone tablet 6 mg Daily    dextrose 10% bolus 125 mL PRN    dextrose 10% bolus 250 mL PRN    everolimus (immunosuppressive) tablet 0.75 mg Q12H    folic acid tablet 1,000 mcg Daily    glucagon (human recombinant) injection 1 mg PRN    glucose chewable tablet 16 g PRN    glucose chewable tablet 24 g PRN    guaiFENesin 12 hr tablet 600 mg BID PRN    insulin aspart U-100 pen 1-10 Units QID (AC + HS) PRN    insulin aspart U-100 pen 10 Units TIDWM    insulin detemir U-100 pen 26 Units BID    multivitamin tablet Daily    mupirocin 2 % ointment BID    naloxone 0.4 mg/mL injection 0.02 mg PRN    pantoprazole EC tablet 40 mg Daily    remdesivir 100 mg in sodium chloride 0.9% 250 mL infusion Daily    sevelamer carbonate tablet 1,600 mg TID WM    sodium bicarbonate tablet 650 mg BID    sodium chloride 0.9% bolus 250 mL PRN    sodium chloride 0.9% flush 10 mL Q12H PRN    sodium chloride 0.9% flush 10 mL PRN    valsartan tablet 160 mg BID     Facility-Administered Medications Ordered in Other Encounters   Medication Frequency    0.9%  NaCl infusion Continuous    cefazolin (ANCEF) 2 gram in dextrose 5% 50 mL IVPB (premix) On Call Procedure       Objective:     Vital Signs (Most Recent):  Temp: 97.5 °F (36.4 °C) (02/15/22 0745)  Pulse: 78 (02/15/22 0753)  Resp: 18 (02/15/22 0745)  BP: (!) 140/72 (02/15/22 0745)  SpO2: 95 % (02/15/22 0745)  O2 Device (Oxygen Therapy): room air (02/15/22 0745) Vital Signs (24h Range):  Temp:  [97.4 °F (36.3 °C)-98.5 °F  (36.9 °C)] 97.5 °F (36.4 °C)  Pulse:  [74-86] 78  Resp:  [16-20] 18  SpO2:  [95 %-99 %] 95 %  BP: (100-140)/(58-88) 140/72     Weight: 94.3 kg (208 lb) (02/14/22 1138)  Body mass index is 29.01 kg/m².  Body surface area is 2.17 meters squared.    No intake/output data recorded.    Physical Exam  Vitals reviewed.   Constitutional:       General: He is not in acute distress.     Appearance: He is well-developed.   HENT:      Head: Normocephalic and atraumatic.   Eyes:      Pupils: Pupils are equal, round, and reactive to light.   Neck:      Vascular: No JVD.   Cardiovascular:      Rate and Rhythm: Normal rate and regular rhythm.      Heart sounds: Normal heart sounds. No murmur heard.      Pulmonary:      Effort: Pulmonary effort is normal. No respiratory distress.      Breath sounds: Normal breath sounds. No wheezing or rales.   Abdominal:      General: Bowel sounds are normal. There is no distension.      Palpations: Abdomen is soft.      Tenderness: There is no abdominal tenderness.   Musculoskeletal:         General: No deformity. Normal range of motion.      Cervical back: Normal range of motion and neck supple.      Comments: LUE brachiocephalic AVF with good thrill and bruit.   Skin:     General: Skin is warm.   Neurological:      Mental Status: He is alert and oriented to person, place, and time.         Significant Labs:  CBC:   Recent Labs   Lab 02/15/22  0856   WBC 11.16   RBC 3.31*   HGB 9.6*   HCT 29.1*      MCV 88   MCH 29.0   MCHC 33.0     CMP:   Recent Labs   Lab 02/14/22  0356      CALCIUM 9.3   ALBUMIN 3.0*   PROT 7.1      K 4.5   CO2 20*   CL 94*   *   CREATININE 12.5*   ALKPHOS 64   ALT 15   AST 18   BILITOT 0.4     All labs within the past 24 hours have been reviewed.

## 2022-02-15 NOTE — ASSESSMENT & PLAN NOTE
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 Suspect fluid overload in setting of viral PNA--- CMV Neg, no more diarrhea.     Recommendations    1. Continue to monitor off Abx.  2. Continue Rem/Dexa per COVID-19 protocol.  3. Close monitoring of his AVF site and previous IV lines.

## 2022-02-15 NOTE — ASSESSMENT & PLAN NOTE
Patient takes Tresiba 20 units qhs and Aspart 10units tid plus sliding scale at home.  Expect suboptimally controlled blood glucose with infection and addition of dexamethasone.  Beta-Hydroxy is WNL.  Defer management to primary team

## 2022-02-15 NOTE — ASSESSMENT & PLAN NOTE
- Hold Tacrolimus and will followup outpatient  - Hold MMF due to resistant CMV  - Cont everolimus

## 2022-02-15 NOTE — ASSESSMENT & PLAN NOTE
ESRD on IHD  TTS  -  Last iHD 2/12 per patient  Out patient HD [facility] - [facility]/ Dr. Zenia Peacock.  Duration of outpatient dialysis session - 4 hrs  EDW: per patient was 98.5-99 kg but might be lower he states as he has lost weight.  Residual Renal Function (Urine Output) - reports ~250-400 cc per day  Access: LUE brachiocephalic fistula, created 10/13/2021 by Dr Thomason    End-Stage Renal Disease on HD  - Will provide dialysis for metabolic clearance and volume management Today  - Target ultrafiltration:  ~2-3 L  - Dialysate adjusted to current labs   - Avoid nephrotoxic medications  - Medications to renal parameters  - Strict Input and Output and chart  - Daily standing weights    Active Problem in Hospital: COVID pneumonia    Anemia of Chronic Kidney Disease   - Hb > 7 gm/dL  - Will resume ROGELIO as outpatient   - Adequate iron stores as per most recent profile   - Will request iron studies to assess further needs of supplementation     Mineral Bone Disease in CKD   - Renal Function Panel Daily for electrolytes monitoring  - Already on binders as outpatient, will continue    HTN   - BP, will continue to monitor. Goal for BP is <140 mmHg SBP and BDP <90 mmHg, maintain MAP > 65.    Nutrition   - Renal Diet

## 2022-02-15 NOTE — PROGRESS NOTES
[facility] - [facility] ([address] Medicine  Progress Note    Patient Name: Martin Hendrix Jr.  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  Patient reported oxygen saturation of 88% on room air, he is on home oxygen as needed during exertion however was using it more often and at rest.  Reports requiring higher level of oxygen up to 5 L even at rest, which is unusual for him.  Today, the patient reports improvements in symptoms however he continues to be short of breath and quickly becomes tachypneic with worsening oxygen sat on ambulation.    At the outside hospital emergency room patient was COVID positive, chest x-ray with mid and lower lung interstitial disease (improved from previous).  Patient was requiring 4 L of oxygen satting 95%, was started on COVID protocol with Rocephin, azithromycin, dexamethasone and weight based Lovenox.  Due to patient's history of  lung transplant decision was made to transfer to [facility] for transplant pulmonology evaluation. He was admitted to [facility] while awaiting a bed at [facility].  His labs were as follow:  COVID positive (February 11), INR 1.13, D-dimer 4226, BNP 97.1, troponin less than 0.03, CPK 93, sodium 130, potassium 5.4 (hemolyzed)-repeat potassium 4.8, chloride 91, CO2 18, , creatinine 19.07, glucose 125, AST 38, ALT 19, white blood cells 9.5, hemoglobin 11.3, hematocrit 33.7, platelets 284, lactic acid 0.9 ABG with pH 7.33/pCO2 34/PO2 80 (2 L nasal cannula) VS:  Temperature 98.3°, pulse 120, blood pressure 160/90, oxygen saturation 99%.     On presentation to [facility] patient was on 2 L satting 95%, hemodynamically stable.  Admitted to Hospital Medicine for further care and management.          Overview/Hospital Course:  Patient admitted to Hosp Med and started on dexamethasone and remdesivir in setting of COVID. ID, Lung Transplant both consulted. Patient having hyperglycemia likely due to dexamethasone in setting of T2DM, insulin drip started, work up for DKA pending. Patient sating 94% on RA since admission. Insulin gtt stopped and transitioned to basal+prandial SQ insulin. Holding tacro for high serum level. Pulmonary transplant evaluated him and observed stability. Planning for discharge.      Interval History: NAEON. Afebrile and VSS. Patient refused morning labs because labs will be drawn for dialysis today. He was seen resting comfortably in bed. He reports sore throat and SOB. He denies fever, CP, chills, productive.     Review of Systems   Constitutional: Negative for chills and fever.   HENT: Positive for sore throat. Negative for congestion and rhinorrhea.    Eyes: Negative for pain and visual disturbance.   Respiratory: Positive for shortness of breath. Negative for chest tightness.    Cardiovascular: Negative for chest pain and palpitations.   Gastrointestinal: Negative for abdominal distention, abdominal pain, constipation, diarrhea, nausea and vomiting.   Genitourinary: Negative for difficulty urinating and dysuria.   Musculoskeletal: Negative for back pain and neck pain.   Skin: Negative for rash and wound.   Neurological: Negative for dizziness and headaches.   Psychiatric/Behavioral: Negative for confusion and sleep disturbance.     Objective:     Vital Signs (Most Recent):  Temp: 97.5 °F (36.4 °C) (02/15/22 0745)  Pulse: 78 (02/15/22 0753)  Resp: 18 (02/15/22 0745)  BP: (!) 140/72 (02/15/22 0745)  SpO2: 95 % (02/15/22 0745) Vital Signs (24h Range):  Temp:   [97.4 °F (36.3 °C)-98.5 °F (36.9 °C)] 97.5 °F (36.4 °C)  Pulse:  [74-86] 78  Resp:  [16-20] 18  SpO2:  [95 %-99 %] 95 %  BP: (100-140)/(58-88) 140/72     Weight: 94.3 kg (208 lb)  Body mass index is 29.01 kg/m².    Intake/Output Summary (Last 24 hours) at 2/15/2022 1028  Last data filed at 2/15/2022 0500  Gross per 24 hour   Intake --   Output 100 ml   Net -100 ml      Physical Exam  Vitals and nursing note reviewed.   Constitutional:       General: He is not in acute distress.     Appearance: Normal appearance. He is not ill-appearing or diaphoretic.   HENT:      Head: Normocephalic and atraumatic.      Right Ear: External ear normal.      Left Ear: External ear normal.      Nose: Nose normal. No congestion or rhinorrhea.      Mouth/Throat:      Mouth: Mucous membranes are moist.      Pharynx: Oropharynx is clear.   Eyes:      General: No scleral icterus.     Extraocular Movements: Extraocular movements intact.      Pupils: Pupils are equal, round, and reactive to light.   Cardiovascular:      Rate and Rhythm: Normal rate and regular rhythm.      Pulses: Normal pulses.      Heart sounds: Normal heart sounds.   Pulmonary:      Effort: Pulmonary effort is normal.      Breath sounds: Rales present. No wheezing or rhonchi.   Abdominal:      General: Bowel sounds are normal. There is no distension.      Palpations: Abdomen is soft.      Tenderness: There is no abdominal tenderness. There is no right CVA tenderness or left CVA tenderness.   Musculoskeletal:         General: Normal range of motion.      Cervical back: Normal range of motion and neck supple. No tenderness.      Right lower leg: No edema.      Left lower leg: No edema.   Lymphadenopathy:      Cervical: No cervical adenopathy.   Skin:     General: Skin is warm and dry.   Neurological:      Mental Status: He is alert and oriented to person, place, and time.   Psychiatric:         Mood and Affect: Mood normal.         Behavior: Behavior normal.         Thought  Content: Thought content normal.         Judgment: Judgment normal.         Significant Labs: All pertinent labs within the past 24 hours have been reviewed.    Significant Imaging: I have reviewed all pertinent imaging results/findings within the past 24 hours.      Assessment/Plan:      * COVID-19  Patient is identified as High risk for severe complications of COVID 19 based on COVID risk score of 7   Initiate standard COVID protocols; COVID-19 testing Collection Date: 2/11/2022 Collection Time:   2:05 PM ,Infection Control notification  and isolation- respiratory, contact and droplet per protocol    Diagnostics: (leukopenia, hyponatremia, hyperferritinemia, elevated troponin, elevated d-dimer, age, and comorbidities are significant predictors of poor clinical outcome)  CBC, CMP, Procalcitonin, Ferritin, CRP, LDH, BNP, ECG, Blood Culture x2, Portable CXR, UA and culture, PTT and INR    Management: Initiate targeted therapy with Remdesivir, 200mg IV x1, followed by 100mg IV daily x5 days total and Dexamethasone PO/IV 6mg daily x10 days, Maintain oxygen saturations 92-96% via Nasal Cannula  LPM and monitor with continuous/intermittent pulse oximetry. , Inhaled bronchodilators as needed for shortness of breath., Continuous cardiac monitoring. and Manage respiratory failure (O2 requirement >10LPM or needing NIPPV/Mechanical ventilation) and/or Pneumonia (active chest infiltrates) separately as described below.  - satting well on Room air  - heparin drip continued, was started at outside hospital (was initially on Lovenox)   - continue on antibiotic coverage, pending ID and transplant eval      Advance Care Planning  Current advance care plan has been discussed with patient/family/POA and patient currently wishes Full Code.        ESRD (end stage renal disease)    Patient is on dialysis Tuesday Thursday and Saturday, however over the past week outside hospital he has been receiving dialysis Monday Wednesday and Friday,  with most recent being Friday 11th.    - consult nephrology, inpatient HD  - phosphate binder t.i.d.  - avoid nephrotoxic agents  - monitor CMP  - Will need to find COVID dialysis chair    Hypertension associated with diabetes  Echo 2/14:   · Technically very difficult study  · The left ventricle is normal in size with concentric remodeling and low normal systolic function. The estimated ejection fraction is 50%.  · Normal left ventricular diastolic function.  · The right heart and IVC were not well visualized.    -Continue home clonidine 0.1mg TID, valsartan 160mg BID, amlodipine 10mg qd and Coreg 3.125mg BID      Mixed hyperlipidemia  home statin rosuvastatin 10mg    -Lipitor 40mg qd while inpatient      CMV (cytomegalovirus infection) status positive  Cytomegalovirus DNA Detec...   Cytomegalovirus Log (copies/mL) <1.70   Cytomegalovirus PCR, Quant <50     - Quant is low, so will not treat  -Transplant Pulm following  - Hold MMF    Type 2 diabetes mellitus with hyperglycemia, with long-term current use of insulin  -Last A1c reviewed-   Lab Results   Component Value Date    HGBA1C 8.1 (H) 12/13/2021       Home Antihyperglycemic Regiment:  -LDSSI, Aspart 10 u TID and deglu 20 u QHS    Inpatient Antihyperglycemic Regiment:  Antihyperglycemics (From admission, onward)            Start     Stop Route Frequency Ordered    02/14/22 1645  insulin aspart U-100 pen 10 Units         -- SubQ 3 times daily with meals 02/14/22 1208    02/14/22 1308  insulin aspart U-100 pen 1-10 Units         -- SubQ Before meals & nightly PRN 02/14/22 1208    02/14/22 1245  insulin detemir U-100 pen 26 Units         -- SubQ 2 times daily 02/14/22 1208        - Detemir 26 units BID   - SSI with POCT accuchecks ACHS and Diabetic diet 2000 byron  - Diabetic nutritional counseling given   - Goal 140-180 while IP          Prophylactic antibiotic  Holding, per ID recs      Lung replaced by transplant    Patient status post lung transplant x2  [place] [facility] [place]  Patient is on home Prograf and everolimus    - HOLD home Prograf and continue everolimus  - continue weaning oxygen as tolerated  - transplant Pulm consulted  - ID consulted      Immunosuppression  - Hold Tacrolimus and will followup outpatient  - Hold MMF due to resistant CMV  - Cont everolimus            VTE Risk Mitigation (From admission, onward)         Ordered     apixaban tablet 5 mg  2 times daily         02/15/22 1521     IP VTE HIGH RISK PATIENT  Once         02/13/22 0034     Place sequential compression device  Until discontinued         02/13/22 0034                Discharge Planning   DERICK: 2/16/2022     Code Status: Full Code   Is the patient medically ready for discharge?: No    Reason for patient still in hospital (select all that apply): Patient trending condition, Laboratory test, Treatment and Consult recommendations  Discharge Plan A: Home Health                  Maximino Rangel MD  Department of Hospital Medicine   [facility] - Intensive Care ([place]-16)

## 2022-02-15 NOTE — ASSESSMENT & PLAN NOTE
Cytomegalovirus DNA Detec...   Cytomegalovirus Log (copies/mL) <1.70   Cytomegalovirus PCR, Quant <50     - Quant is low, so will not treat  -Transplant Pulm following  - Hold MMF

## 2022-02-15 NOTE — SUBJECTIVE & OBJECTIVE
Interval History: NAEON. Afebrile and VSS. Patient refused morning labs because labs will be drawn for dialysis today. He was seen resting comfortably in bed. He reports sore throat and SOB. He denies fever, CP, chills, productive.     Review of Systems   Constitutional: Negative for chills and fever.   HENT: Positive for sore throat. Negative for congestion and rhinorrhea.    Eyes: Negative for pain and visual disturbance.   Respiratory: Positive for shortness of breath. Negative for chest tightness.    Cardiovascular: Negative for chest pain and palpitations.   Gastrointestinal: Negative for abdominal distention, abdominal pain, constipation, diarrhea, nausea and vomiting.   Genitourinary: Negative for difficulty urinating and dysuria.   Musculoskeletal: Negative for back pain and neck pain.   Skin: Negative for rash and wound.   Neurological: Negative for dizziness and headaches.   Psychiatric/Behavioral: Negative for confusion and sleep disturbance.     Objective:     Vital Signs (Most Recent):  Temp: 97.5 °F (36.4 °C) (02/15/22 0745)  Pulse: 78 (02/15/22 0753)  Resp: 18 (02/15/22 0745)  BP: (!) 140/72 (02/15/22 0745)  SpO2: 95 % (02/15/22 0745) Vital Signs (24h Range):  Temp:  [97.4 °F (36.3 °C)-98.5 °F (36.9 °C)] 97.5 °F (36.4 °C)  Pulse:  [74-86] 78  Resp:  [16-20] 18  SpO2:  [95 %-99 %] 95 %  BP: (100-140)/(58-88) 140/72     Weight: 94.3 kg (208 lb)  Body mass index is 29.01 kg/m².    Intake/Output Summary (Last 24 hours) at 2/15/2022 1028  Last data filed at 2/15/2022 0500  Gross per 24 hour   Intake --   Output 100 ml   Net -100 ml      Physical Exam  Vitals and nursing note reviewed.   Constitutional:       General: He is not in acute distress.     Appearance: Normal appearance. He is not ill-appearing or diaphoretic.   HENT:      Head: Normocephalic and atraumatic.      Right Ear: External ear normal.      Left Ear: External ear normal.      Nose: Nose normal. No congestion or rhinorrhea.       Mouth/Throat:      Mouth: Mucous membranes are moist.      Pharynx: Oropharynx is clear.   Eyes:      General: No scleral icterus.     Extraocular Movements: Extraocular movements intact.      Pupils: Pupils are equal, round, and reactive to light.   Cardiovascular:      Rate and Rhythm: Normal rate and regular rhythm.      Pulses: Normal pulses.      Heart sounds: Normal heart sounds.   Pulmonary:      Effort: Pulmonary effort is normal.      Breath sounds: Rales present. No wheezing or rhonchi.   Abdominal:      General: Bowel sounds are normal. There is no distension.      Palpations: Abdomen is soft.      Tenderness: There is no abdominal tenderness. There is no right CVA tenderness or left CVA tenderness.   Musculoskeletal:         General: Normal range of motion.      Cervical back: Normal range of motion and neck supple. No tenderness.      Right lower leg: No edema.      Left lower leg: No edema.   Lymphadenopathy:      Cervical: No cervical adenopathy.   Skin:     General: Skin is warm and dry.   Neurological:      Mental Status: He is alert and oriented to person, place, and time.   Psychiatric:         Mood and Affect: Mood normal.         Behavior: Behavior normal.         Thought Content: Thought content normal.         Judgment: Judgment normal.         Significant Labs: All pertinent labs within the past 24 hours have been reviewed.    Significant Imaging: I have reviewed all pertinent imaging results/findings within the past 24 hours.

## 2022-02-15 NOTE — ASSESSMENT & PLAN NOTE
Patient is prescribed tacrolimus 3 mg BID, everolimus 0.75 mg daily, prednisone 5 mg daily.   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    Supratherapeutic tac levels 2/2 missed HD  Continue to hold tac for now  Continue everolimus  Discontinue prednisone while on dexamethasone.    Please resume when dexamethasone course completed.

## 2022-02-15 NOTE — PLAN OF CARE
[facility] ([place] [address])      HOME HEALTH ORDERS  FACE TO FACE ENCOUNTER    Patient Name: Martin Hendrix Jr.  YOB: 1960    PCP: Michel Henley MD   PCP Address: [address] / [place]  PCP Phone Number: [phone]  PCP Fax: [phone]    Encounter Date: 2/11/22    Admit to Home Health    Diagnoses:  Active Hospital Problems    Diagnosis  POA    *COVID-19 [U07.1]  Yes    ESRD (end stage renal disease) [N18.6]  Yes    Hypertension associated with diabetes [E11.59, I15.2]  Yes     Chronic     CHRONIC.  Uncontrolled. BP Reviewed.  Compliant with BP medications. No SE reported.   (-) CP, SOB, palpitations, dizziness, lightheadedness, HA, arm numbness, tingling or weakness, syncope.  Creatinine   Date Value Ref Range Status   07/08/2020 3.1 (H) 0.5 - 1.4 mg/dL Final           Mixed hyperlipidemia [E78.2]  Yes    CMV (cytomegalovirus infection) status positive [B25.9]  Yes    Type 2 diabetes mellitus with hyperglycemia, with long-term current use of insulin [E11.65, Z79.4]  Not Applicable     Chronic     Initial HPI:  Reviewed Diabetes Management StatusStatin:TakingACE/ARB: Taking  December 2020:  Reviewed Diabetes Management Status  Patient reports taking Humalog sliding scale as needed in addition to long-acting insulin.  Statin: Taking  ACE/ARB: Taking    Screening or Prevention Patient's value Goal Complete/Controlled?   HgA1C Testing and Control   Lab Results   Component Value Date    HGBA1C 13.3 (H) 10/13/2020      Annually/Less than 8% No   Lipid profile : 10/13/2020 Annually Yes   LDL control Lab Results   Component Value Date    LDLCALC 197.2 (H) 10/13/2020    Annually/Less than 100 mg/dl  No   Nephropathy screening Lab Results   Component Value Date    LABMICR 2964.0 06/01/2020     Lab Results   Component Value Date    PROTEINUA 3+ (A) 10/13/2020    Annually Yes   Blood pressure BP Readings from Last 1 Encounters:   12/14/20 (!) 148/91    Less than  140/90 No   Dilated retinal exam : 12/13/2017 Annually No   Foot exam   : 07/21/2020 Annually Yes           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       Follow Up Appointments:  Future Appointments   Date Time Provider Department Center   2/21/2022  8:30 AM DERMATOLOGY RESIDENT CLINIC [facility] DERM [facility]   3/7/2022  8:15 AM [facility] OIC-XRAY [facility] XRAY [facility]   3/7/2022  8:35 AM LAB, TRANSPLANT [facility] LABTX [facility]   3/7/2022  8:40 AM SPECIMEN LAB, TRANSPLANT [facility] LABTX [facility]   3/7/2022  9:00 AM PULMONARY FUNCTION [facility] PULMLAB [facility]   3/7/2022  9:15 AM PULMONARY FUNCTION [facility] PULMLAB [facility]   3/7/2022  9:30 AM Robina Mcdonald MD [facility] LUNGTX [facility]   4/25/2022  8:30 AM VASCULAR, LAB [facility] VASCLAB [facility]   4/25/2022  9:00 AM CARLI Thomason II, MD [facility] VASCSUR [facility]   9/14/2022 10:20 AM Michel Henley MD [facility]       Allergies:Review of patient's allergies indicates:  No Known Allergies    Medications: Review discharge medications with patient and family and provide education.    Current Facility-Administered Medications   Medication Dose Route Frequency Provider Last Rate Last Admin    0.9%  NaCl infusion   Intravenous Once Cris Viramontes MD        albuterol inhaler 2 puff  2 puff Inhalation Q6H Sophie Jones MD   2 puff at 02/15/22 1351    amLODIPine tablet 10 mg  10 mg Oral Daily Sophie Jones MD   10 mg at 02/15/22 0814    apixaban tablet 5 mg  5 mg Oral BID Susanna Yi MD        ascorbic acid (vitamin C) tablet 500 mg  500 mg Oral BID Sophie Jones MD   500 mg at 02/15/22 0814    atorvastatin tablet 40 mg  40 mg Oral Daily Sophie Jones MD   40 mg at 02/15/22 0814    carvediloL tablet 3.125 mg  3.125 mg Oral BID Sophie Jones MD   3.125 mg at 02/15/22 0814    cloNIDine tablet 0.1 mg   0.1 mg Oral TID Sophie Jones MD   0.1 mg at 02/15/22 1524    dexAMETHasone tablet 6 mg  6 mg Oral Daily Sophie Jones MD   6 mg at 02/15/22 0814    dextrose 10% bolus 125 mL  12.5 g Intravenous PRN Mike Cordoba MD        dextrose 10% bolus 250 mL  25 g Intravenous PRN Mike Cordoba MD        everolimus (immunosuppressive) tablet 0.75 mg  0.75 mg Oral Q12H Sophie Jones MD   0.75 mg at 02/15/22 0815    folic acid tablet 1,000 mcg  1,000 mcg Oral Daily Sophie Jones MD   1,000 mcg at 02/15/22 0815    glucagon (human recombinant) injection 1 mg  1 mg Intramuscular PRN Maximino Rangel MD        glucose chewable tablet 16 g  16 g Oral PRN Maximino Rangel MD        glucose chewable tablet 24 g  24 g Oral PRN Maximino Rangel MD        guaiFENesin 12 hr tablet 600 mg  600 mg Oral BID PRN Maximino Rangel MD        insulin aspart U-100 pen 1-10 Units  1-10 Units Subcutaneous QID (AC + HS) PRN Maximino Rangel MD   2 Units at 02/15/22 1232    insulin aspart U-100 pen 10 Units  10 Units Subcutaneous TIDWM Maximino Rangel MD   10 Units at 02/15/22 1232    insulin detemir U-100 pen 26 Units  26 Units Subcutaneous BID Maximino Rangel MD   26 Units at 02/15/22 0844    multivitamin tablet  1 tablet Oral Daily Sophie Jones MD   1 tablet at 02/15/22 0814    mupirocin 2 % ointment   Nasal BID Mike Cordoba MD   Given at 02/14/22 2144    naloxone 0.4 mg/mL injection 0.02 mg  0.02 mg Intravenous PRN Sophie Jones MD        pantoprazole EC tablet 40 mg  40 mg Oral Daily Sophie Jones MD   40 mg at 02/15/22 0815    remdesivir 100 mg in sodium chloride 0.9% 250 mL infusion  100 mg Intravenous Daily Sophie Jones MD   Stopped at 02/15/22 0848    sevelamer carbonate tablet 1,600 mg  1,600 mg Oral TID  Mike Cordoba MD   1,600 mg at 02/15/22 1231    sodium bicarbonate tablet 650 mg  650 mg Oral BID Sophie Jones MD   650 mg at 02/15/22 0815     sodium chloride 0.9% bolus 250 mL  250 mL Intravenous PRN Cris Viramontes MD        sodium chloride 0.9% flush 10 mL  10 mL Intravenous Q12H PRN Sophie Jones MD        sodium chloride 0.9% flush 10 mL  10 mL Intravenous PRN Sophie Jones MD        valsartan tablet 160 mg  160 mg Oral BID Sophie Jones MD   160 mg at 02/15/22 0814     Facility-Administered Medications Ordered in Other Encounters   Medication Dose Route Frequency Provider Last Rate Last Admin    0.9%  NaCl infusion   Intravenous Continuous Cait Yan MD        cefazolin (ANCEF) 2 gram in dextrose 5% 50 mL IVPB (premix)  2 g Intravenous On Call Procedure Cait Yan MD         Current Discharge Medication List      CONTINUE these medications which have CHANGED    Details   azithromycin (Z-REYNA) 250 MG tablet Take 2 tablets (500 mg total) by mouth every Mon, Wed, Fri.  Qty: 30 tablet, Refills: 11      tacrolimus (PROGRAF) 0.5 MG Cap Take 6 capsules (3 mg total) by mouth every 12 (twelve) hours. 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      cloNIDine (CATAPRES) 0.1 MG tablet Take 1 tablet (0.1 mg total) by mouth 3 (three) times daily.  Qty: 90 tablet, Refills: 11    Comments: .      everolimus, immunosuppressive, (ZORTRESS) 0.75 mg tablet Take 1 tablet (0.75 mg total) by mouth every 12 (twelve) hours.  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 insulin aspart U-100 (NOVOLOG FLEXPEN U-100 INSULIN) 100 unit/mL (3 mL) InPn pen Inject 10 Units into the skin 3 (three) times daily with meals.  Qty: 15 mL, Refills: 11    Comments: Plus SSI:  150/25.  Associated Diagnoses: Type 2 diabetes mellitus with hyperglycemia, with long-term current use of insulin      !! insulin aspart U-100 (NOVOLOG) 100 unit/mL (3 mL) InPn pen Inject 0-5 Units into the skin before meals and at bedtime as needed (Hyperglycemia). Use in addition to 10 units TID with meals  Refills: 0      insulin degludec (TRESIBA FLEXTOUCH U-200) 200 unit/mL (3 mL) insulin pen Inject 20 Units into the skin every evening.  Qty: 6 pen, Refills: 4    Associated Diagnoses: Type 2 diabetes mellitus with hyperglycemia, with long-term current use of insulin      magnesium chloride (MAG 64) 64 mg TbEC Take 2 tablets (128 mg total) by mouth 2 (two) times daily.  Qty: 120 tablet, Refills: 11    Associated Diagnoses: Hypomagnesemia      pantoprazole (PROTONIX) 40 MG tablet Take 1 tablet (40 mg total) by mouth once daily.  Qty: 30 tablet, Refills: 11      predniSONE (DELTASONE) 5 MG tablet Take 1 tablet (5 mg total) by mouth once daily.  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  Qty: 30 tablet, Refills: 3      sevelamer carbonate (RENVELA) 800 mg Tab TAKE 2 TABLETS BY MOUTH 3 TIMES A DAY WITH MEALS.  Qty: 180 tablet, Refills: 1      sulfamethoxazole-trimethoprim 400-80mg (BACTRIM,SEPTRA) 400-80 mg per tablet Take 1 tablet by mouth every Mon, Wed, Fri.  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  Qty: 180 tablet, Refills: 3    Comments: increasing dose      cholecalciferol, vitamin D3, (VITAMIN D3) 25 mcg (1,000 unit) capsule Take 2 capsules (2,000 Units total) by mouth once daily.  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 Administered by dialysis unit on T/Th/Sat.      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  Qty: 15 tablet, Refills: 3    Associated Diagnoses: Antibody mediated rejection of lung transplant; Preventive medication therapy needed      ONETOUCH VERIO Strp USE TO CHECK BLOOD GLUCOSE FOUR TIMES A DAY, TO USE WITH INSURANCE PREFERRED METER  Qty: 350 each, Refills: 3    Associated Diagnoses: Type 2 diabetes mellitus with hyperglycemia, with long-term current use of insulin      pen needle, diabetic (BD ULTRA-FINE JIM PEN NEEDLE) 32 gauge x 5/32" Ndle Uses 4 times daily, on multiple daily insulin injections  Qty: 350 each, Refills: 3    Associated Diagnoses: Type 2 diabetes mellitus with hyperglycemia, with long-term current use of insulin      sodium bicarbonate 650 MG tablet Two in am and two in pm  Qty: 120 tablet, Refills: 11       !! - Potential duplicate " medications found. Please discuss with provider.      STOP taking these medications       ACCU-CHEK DEANNE PLUS METER Misc Comments:   Reason for Stopping:                 I have seen and examined this patient within the last 30 days. My clinical findings that support the need for the home health skilled services and home bound status are the following:no   Weakness/numbness causing balance and gait disturbance due to Infection and Weakness/Debility making it taxing to leave home.     Diet:   diabetic diet 2000 calorie and renal diet      Referrals/ Consults  Physical Therapy to evaluate and treat. Evaluate for home safety and equipment needs; Establish/upgrade home exercise program. Perform / instruct on therapeutic exercises, gait training, transfer training, and Range of Motion.  Occupational Therapy to evaluate and treat. Evaluate home environment for safety and equipment needs. Perform/Instruct on transfers, ADL training, ROM, and therapeutic exercises.    Activities:   activity as tolerated    Nursing:   Agency to admit patient within 24 hours of hospital discharge unless specified on physician order or at patient request    SN to complete comprehensive assessment including routine vital signs. Instruct on disease process and s/s of complications to report to MD. Review/verify medication list sent home with the patient at time of discharge  and instruct patient/caregiver as needed. Frequency may be adjusted depending on start of care date.     Skilled nurse to perform up to 3 visits PRN for symptoms related to diagnosis    Notify MD if SBP > 160 or < 90; DBP > 90 or < 50; HR > 120 or < 50; Temp > 101; O2 < 88%;    Ok to schedule additional visits based on staff availability and patient request on consecutive days within the home health episode.    When multiple disciplines ordered:    Start of Care occurs on Sunday - Wednesday schedule remaining discipline evaluations as ordered on separate consecutive days  following the start of care.    Thursday SOC -schedule subsequent evaluations Friday and Monday the following week.     Friday - Saturday SOC - schedule subsequent discipline evaluations on consecutive days starting Monday of the following week.    For all post-discharge communication and subsequent orders please contact patient's primary care physician. If unable to reach primary care physician or do not receive response within 30 minutes, please contact PCP for clinical staff order clarification    Miscellaneous   Diabetic Care:   SN to perform and educate Diabetic management with blood glucose monitoring:, Fingerstick blood sugar AC and HS and Report CBG < 60 or > 350 to physician.  Home Oxygen:  Oxygen at 2 L/min nasal canula to be used:  As needed for SOB.    Home Health Aide:  Physical Therapy Three times weekly and Occupational Therapy Three times weekly    Wound Care Orders  no    I certify that this patient is confined to his home and needs physical therapy and occupational therapy.

## 2022-02-15 NOTE — ASSESSMENT & PLAN NOTE
Patient is on dialysis Tuesday Thursday and Saturday, however over the past week outside hospital he has been receiving dialysis Monday Wednesday and Friday, with most recent being Friday 11th.    - consult nephrology, inpatient HD  - phosphate binder t.i.d.  - avoid nephrotoxic agents  - monitor CMP  - Will need to find COVID dialysis chair

## 2022-02-15 NOTE — PROGRESS NOTES
[facility] ([place] [address])  Nephrology  Progress Note    Patient Name: Martin Hendrix Jr.  MRN: [id number]  Admission Date: 2/12/2022  Hospital Length of Stay: 3 days  Attending Provider: Mike Cordoba MD   Primary Care Physician: Michel Henley MD  Principal Problem:COVID-19    Subjective:     Interval History:   NAEON. Appears comfortable on exam. Plans for HD today.     Review of patient's allergies indicates:  No Known Allergies  Current Facility-Administered Medications   Medication Frequency    0.9%  NaCl infusion Once    albuterol inhaler 2 puff Q6H    amLODIPine tablet 10 mg Daily    ascorbic acid (vitamin C) tablet 500 mg BID    atorvastatin tablet 40 mg Daily    carvediloL tablet 3.125 mg BID    cloNIDine tablet 0.1 mg TID    dexAMETHasone tablet 6 mg Daily    dextrose 10% bolus 125 mL PRN    dextrose 10% bolus 250 mL PRN    everolimus (immunosuppressive) tablet 0.75 mg Q12H    folic acid tablet 1,000 mcg Daily    glucagon (human recombinant) injection 1 mg PRN    glucose chewable tablet 16 g PRN    glucose chewable tablet 24 g PRN    guaiFENesin 12 hr tablet 600 mg BID PRN    insulin aspart U-100 pen 1-10 Units QID (AC + HS) PRN    insulin aspart U-100 pen 10 Units TIDWM    insulin detemir U-100 pen 26 Units BID    multivitamin tablet Daily    mupirocin 2 % ointment BID    naloxone 0.4 mg/mL injection 0.02 mg PRN    pantoprazole EC tablet 40 mg Daily    remdesivir 100 mg in sodium chloride 0.9% 250 mL infusion Daily    sevelamer carbonate tablet 1,600 mg TID WM    sodium bicarbonate tablet 650 mg BID    sodium chloride 0.9% bolus 250 mL PRN    sodium chloride 0.9% flush 10 mL Q12H PRN    sodium chloride 0.9% flush 10 mL PRN    valsartan tablet 160 mg BID     Facility-Administered Medications Ordered in Other Encounters   Medication Frequency    0.9%  NaCl infusion Continuous    cefazolin (ANCEF) 2 gram in dextrose 5% 50 mL IVPB (premix) On Call  Procedure       Objective:     Vital Signs (Most Recent):  Temp: 97.5 °F (36.4 °C) (02/15/22 0745)  Pulse: 78 (02/15/22 0753)  Resp: 18 (02/15/22 0745)  BP: (!) 140/72 (02/15/22 0745)  SpO2: 95 % (02/15/22 0745)  O2 Device (Oxygen Therapy): room air (02/15/22 0745) Vital Signs (24h Range):  Temp:  [97.4 °F (36.3 °C)-98.5 °F (36.9 °C)] 97.5 °F (36.4 °C)  Pulse:  [74-86] 78  Resp:  [16-20] 18  SpO2:  [95 %-99 %] 95 %  BP: (100-140)/(58-88) 140/72     Weight: 94.3 kg (208 lb) (02/14/22 1138)  Body mass index is 29.01 kg/m².  Body surface area is 2.17 meters squared.    No intake/output data recorded.    Physical Exam  Vitals reviewed.   Constitutional:       General: He is not in acute distress.     Appearance: He is well-developed.   HENT:      Head: Normocephalic and atraumatic.   Eyes:      Pupils: Pupils are equal, round, and reactive to light.   Neck:      Vascular: No JVD.   Cardiovascular:      Rate and Rhythm: Normal rate and regular rhythm.      Heart sounds: Normal heart sounds. No murmur heard.      Pulmonary:      Effort: Pulmonary effort is normal. No respiratory distress.      Breath sounds: Normal breath sounds. No wheezing or rales.   Abdominal:      General: Bowel sounds are normal. There is no distension.      Palpations: Abdomen is soft.      Tenderness: There is no abdominal tenderness.   Musculoskeletal:         General: No deformity. Normal range of motion.      Cervical back: Normal range of motion and neck supple.      Comments: LUE brachiocephalic AVF with good thrill and bruit.   Skin:     General: Skin is warm.   Neurological:      Mental Status: He is alert and oriented to person, place, and time.         Significant Labs:  CBC:   Recent Labs   Lab 02/15/22  0856   WBC 11.16   RBC 3.31*   HGB 9.6*   HCT 29.1*      MCV 88   MCH 29.0   MCHC 33.0     CMP:   Recent Labs   Lab 02/14/22  0356      CALCIUM 9.3   ALBUMIN 3.0*   PROT 7.1      K 4.5   CO2 20*   CL 94*   *    CREATININE 12.5*   ALKPHOS 64   ALT 15   AST 18   BILITOT 0.4     All labs within the past 24 hours have been reviewed.     Assessment/Plan:     * COVID-19  Per primary.    ESRD (end stage renal disease)  ESRD on IHD  TTS  -  Last iHD 2/12 per patient  Out patient [facility] - [facility]/ Dr. Zenia Peacock.  Duration of outpatient dialysis session - 4 hrs  EDW: per patient was 98.5-99 kg but might be lower he states as he has lost weight.  Residual Renal Function (Urine Output) - reports ~250-400 cc per day  Access: LUE brachiocephalic fistula, created 10/13/2021 by Dr Thomason    End-Stage Renal Disease on HD  - Will provide dialysis for metabolic clearance and volume management Today  - Target ultrafiltration:  ~2-3 L  - Dialysate adjusted to current labs   - Avoid nephrotoxic medications  - Medications to renal parameters  - Strict Input and Output and chart  - Daily standing weights    Active Problem in Hospital: COVID pneumonia    Anemia of Chronic Kidney Disease   - Hb > 7 gm/dL  - Will resume ROGELIO as outpatient   - Adequate iron stores as per most recent profile   - Will request iron studies to assess further needs of supplementation     Mineral Bone Disease in CKD   - Renal Function Panel Daily for electrolytes monitoring  - Already on binders as outpatient, will continue    HTN   - BP, will continue to monitor. Goal for BP is <140 mmHg SBP and BDP <90 mmHg, maintain MAP > 65.    Nutrition   - Renal Diet            Thank you for your consult. I will follow-up with patient. Please contact us if you have any additional questions.    Cait Finley, GURPREET, FNP-C  Nephrology  [facility] ([place])

## 2022-02-16 PROBLEM — R53.81 POST VIRAL DEBILITY: Status: ACTIVE | Noted: 2022-02-16

## 2022-02-16 PROBLEM — B94.8 POST VIRAL DEBILITY: Status: ACTIVE | Noted: 2022-02-16

## 2022-02-16 LAB
ALBUMIN SERPL BCP-MCNC: 2.8 G/DL (ref 3.5–5.2)
ALP SERPL-CCNC: 57 U/L (ref 55–135)
ALT SERPL W/O P-5'-P-CCNC: 13 U/L (ref 10–44)
ANION GAP SERPL CALC-SCNC: 23 MMOL/L (ref 8–16)
AST SERPL-CCNC: 16 U/L (ref 10–40)
BASOPHILS # BLD AUTO: 0.02 K/UL (ref 0–0.2)
BASOPHILS NFR BLD: 0.2 % (ref 0–1.9)
BILIRUB SERPL-MCNC: 0.4 MG/DL (ref 0.1–1)
BUN SERPL-MCNC: 149 MG/DL (ref 6–20)
CALCIUM SERPL-MCNC: 9.1 MG/DL (ref 8.7–10.5)
CHLORIDE SERPL-SCNC: 92 MMOL/L (ref 95–110)
CO2 SERPL-SCNC: 16 MMOL/L (ref 23–29)
CREAT SERPL-MCNC: 16.1 MG/DL (ref 0.5–1.4)
DIFFERENTIAL METHOD: ABNORMAL
EOSINOPHIL # BLD AUTO: 0 K/UL (ref 0–0.5)
EOSINOPHIL NFR BLD: 0 % (ref 0–8)
ERYTHROCYTE [DISTWIDTH] IN BLOOD BY AUTOMATED COUNT: 13.2 % (ref 11.5–14.5)
EST. GFR  (AFRICAN AMERICAN): 3.3 ML/MIN/1.73 M^2
EST. GFR  (NON AFRICAN AMERICAN): 2.9 ML/MIN/1.73 M^2
GLUCOSE SERPL-MCNC: 97 MG/DL (ref 70–110)
HCT VFR BLD AUTO: 32.5 % (ref 40–54)
HGB BLD-MCNC: 10.3 G/DL (ref 14–18)
IMM GRANULOCYTES # BLD AUTO: 0.19 K/UL (ref 0–0.04)
IMM GRANULOCYTES NFR BLD AUTO: 1.8 % (ref 0–0.5)
LYMPHOCYTES # BLD AUTO: 0.9 K/UL (ref 1–4.8)
LYMPHOCYTES NFR BLD: 8.6 % (ref 18–48)
MCH RBC QN AUTO: 29.5 PG (ref 27–31)
MCHC RBC AUTO-ENTMCNC: 31.7 G/DL (ref 32–36)
MCV RBC AUTO: 93 FL (ref 82–98)
MONOCYTES # BLD AUTO: 0.7 K/UL (ref 0.3–1)
MONOCYTES NFR BLD: 6.2 % (ref 4–15)
NEUTROPHILS # BLD AUTO: 8.8 K/UL (ref 1.8–7.7)
NEUTROPHILS NFR BLD: 83.2 % (ref 38–73)
NRBC BLD-RTO: 0 /100 WBC
PLATELET # BLD AUTO: 361 K/UL (ref 150–450)
PMV BLD AUTO: 9.7 FL (ref 9.2–12.9)
POCT GLUCOSE: 132 MG/DL (ref 70–110)
POCT GLUCOSE: 165 MG/DL (ref 70–110)
POCT GLUCOSE: 459 MG/DL (ref 70–110)
POTASSIUM SERPL-SCNC: 5 MMOL/L (ref 3.5–5.1)
PROT SERPL-MCNC: 6.1 G/DL (ref 6–8.4)
RBC # BLD AUTO: 3.49 M/UL (ref 4.6–6.2)
SODIUM SERPL-SCNC: 131 MMOL/L (ref 136–145)
TACROLIMUS BLD-MCNC: 13.3 NG/ML (ref 5–15)
WBC # BLD AUTO: 10.55 K/UL (ref 3.9–12.7)

## 2022-02-16 PROCEDURE — 27000646 HC AEROBIKA DEVICE

## 2022-02-16 PROCEDURE — 99900035 HC TECH TIME PER 15 MIN (STAT)

## 2022-02-16 PROCEDURE — 93010 ELECTROCARDIOGRAM REPORT: CPT | Mod: ,,, | Performed by: INTERNAL MEDICINE

## 2022-02-16 PROCEDURE — 85025 COMPLETE CBC W/AUTO DIFF WBC: CPT

## 2022-02-16 PROCEDURE — 80053 COMPREHEN METABOLIC PANEL: CPT

## 2022-02-16 PROCEDURE — 12000002 HC ACUTE/MED SURGE SEMI-PRIVATE ROOM

## 2022-02-16 PROCEDURE — 94799 UNLISTED PULMONARY SVC/PX: CPT

## 2022-02-16 PROCEDURE — 25000003 PHARM REV CODE 250: Performed by: STUDENT IN AN ORGANIZED HEALTH CARE EDUCATION/TRAINING PROGRAM

## 2022-02-16 PROCEDURE — 99232 PR SUBSEQUENT HOSPITAL CARE,LEVL II: ICD-10-PCS | Mod: 95,,, | Performed by: INTERNAL MEDICINE

## 2022-02-16 PROCEDURE — 94664 DEMO&/EVAL PT USE INHALER: CPT

## 2022-02-16 PROCEDURE — 99232 SBSQ HOSP IP/OBS MODERATE 35: CPT | Mod: 95,,, | Performed by: INTERNAL MEDICINE

## 2022-02-16 PROCEDURE — 93010 EKG 12-LEAD: ICD-10-PCS | Mod: ,,, | Performed by: INTERNAL MEDICINE

## 2022-02-16 PROCEDURE — 80197 ASSAY OF TACROLIMUS: CPT | Performed by: STUDENT IN AN ORGANIZED HEALTH CARE EDUCATION/TRAINING PROGRAM

## 2022-02-16 PROCEDURE — 94761 N-INVAS EAR/PLS OXIMETRY MLT: CPT

## 2022-02-16 PROCEDURE — 80100014 HC HEMODIALYSIS 1:1

## 2022-02-16 PROCEDURE — 94640 AIRWAY INHALATION TREATMENT: CPT

## 2022-02-16 PROCEDURE — 27000221 HC OXYGEN, UP TO 24 HOURS

## 2022-02-16 PROCEDURE — 36415 COLL VENOUS BLD VENIPUNCTURE: CPT

## 2022-02-16 PROCEDURE — 27000207 HC ISOLATION

## 2022-02-16 PROCEDURE — 93005 ELECTROCARDIOGRAM TRACING: CPT

## 2022-02-16 PROCEDURE — 99233 SBSQ HOSP IP/OBS HIGH 50: CPT | Mod: CR,,, | Performed by: NURSE PRACTITIONER

## 2022-02-16 PROCEDURE — 99233 PR SUBSEQUENT HOSPITAL CARE,LEVL III: ICD-10-PCS | Mod: CR,,, | Performed by: NURSE PRACTITIONER

## 2022-02-16 PROCEDURE — 63600175 PHARM REV CODE 636 W HCPCS: Performed by: STUDENT IN AN ORGANIZED HEALTH CARE EDUCATION/TRAINING PROGRAM

## 2022-02-16 RX ORDER — BENZONATATE 100 MG/1
100 CAPSULE ORAL 3 TIMES DAILY PRN
Status: DISCONTINUED | OUTPATIENT
Start: 2022-02-16 | End: 2022-02-17 | Stop reason: HOSPADM

## 2022-02-16 RX ORDER — AZITHROMYCIN 250 MG/1
250 TABLET, FILM COATED ORAL
Status: DISCONTINUED | OUTPATIENT
Start: 2022-02-18 | End: 2022-02-17 | Stop reason: HOSPADM

## 2022-02-16 RX ORDER — SULFAMETHOXAZOLE AND TRIMETHOPRIM 400; 80 MG/1; MG/1
1 TABLET ORAL
Status: DISCONTINUED | OUTPATIENT
Start: 2022-02-18 | End: 2022-02-17 | Stop reason: HOSPADM

## 2022-02-16 RX ADMIN — ATORVASTATIN CALCIUM 40 MG: 20 TABLET, FILM COATED ORAL at 08:02

## 2022-02-16 RX ADMIN — SODIUM BICARBONATE 650 MG TABLET 650 MG: at 09:02

## 2022-02-16 RX ADMIN — EVEROLIMUS 0.75 MG: 0.75 TABLET ORAL at 10:02

## 2022-02-16 RX ADMIN — OXYCODONE HYDROCHLORIDE AND ACETAMINOPHEN 500 MG: 500 TABLET ORAL at 08:02

## 2022-02-16 RX ADMIN — APIXABAN 5 MG: 5 TABLET, FILM COATED ORAL at 08:02

## 2022-02-16 RX ADMIN — APIXABAN 5 MG: 5 TABLET, FILM COATED ORAL at 09:02

## 2022-02-16 RX ADMIN — SEVELAMER CARBONATE 1600 MG: 800 TABLET, FILM COATED ORAL at 12:02

## 2022-02-16 RX ADMIN — VALSARTAN 160 MG: 160 TABLET, FILM COATED ORAL at 08:02

## 2022-02-16 RX ADMIN — INSULIN ASPART 10 UNITS: 100 INJECTION, SOLUTION INTRAVENOUS; SUBCUTANEOUS at 09:02

## 2022-02-16 RX ADMIN — THERA TABS 1 TABLET: TAB at 08:02

## 2022-02-16 RX ADMIN — ALBUTEROL SULFATE 2 PUFF: 108 INHALANT RESPIRATORY (INHALATION) at 01:02

## 2022-02-16 RX ADMIN — CARVEDILOL 3.12 MG: 3.12 TABLET, FILM COATED ORAL at 08:02

## 2022-02-16 RX ADMIN — ALBUTEROL SULFATE 2 PUFF: 108 INHALANT RESPIRATORY (INHALATION) at 07:02

## 2022-02-16 RX ADMIN — INSULIN ASPART 2 UNITS: 100 INJECTION, SOLUTION INTRAVENOUS; SUBCUTANEOUS at 12:02

## 2022-02-16 RX ADMIN — MUPIROCIN: 20 OINTMENT TOPICAL at 09:02

## 2022-02-16 RX ADMIN — SODIUM BICARBONATE 650 MG TABLET 650 MG: at 08:02

## 2022-02-16 RX ADMIN — INSULIN DETEMIR 26 UNITS: 100 INJECTION, SOLUTION SUBCUTANEOUS at 08:02

## 2022-02-16 RX ADMIN — CLONIDINE HYDROCHLORIDE 0.1 MG: 0.1 TABLET ORAL at 08:02

## 2022-02-16 RX ADMIN — OXYCODONE HYDROCHLORIDE AND ACETAMINOPHEN 500 MG: 500 TABLET ORAL at 09:02

## 2022-02-16 RX ADMIN — ALBUTEROL SULFATE 2 PUFF: 108 INHALANT RESPIRATORY (INHALATION) at 03:02

## 2022-02-16 RX ADMIN — INSULIN ASPART 10 UNITS: 100 INJECTION, SOLUTION INTRAVENOUS; SUBCUTANEOUS at 08:02

## 2022-02-16 RX ADMIN — FOLIC ACID 1000 MCG: 1 TABLET ORAL at 08:02

## 2022-02-16 RX ADMIN — SEVELAMER CARBONATE 1600 MG: 800 TABLET, FILM COATED ORAL at 08:02

## 2022-02-16 RX ADMIN — AMLODIPINE BESYLATE 10 MG: 10 TABLET ORAL at 08:02

## 2022-02-16 RX ADMIN — REMDESIVIR 100 MG: 100 INJECTION, POWDER, LYOPHILIZED, FOR SOLUTION INTRAVENOUS at 08:02

## 2022-02-16 RX ADMIN — PANTOPRAZOLE SODIUM 40 MG: 40 TABLET, DELAYED RELEASE ORAL at 08:02

## 2022-02-16 RX ADMIN — INSULIN ASPART 10 UNITS: 100 INJECTION, SOLUTION INTRAVENOUS; SUBCUTANEOUS at 12:02

## 2022-02-16 RX ADMIN — DEXAMETHASONE 6 MG: 4 TABLET ORAL at 08:02

## 2022-02-16 NOTE — PLAN OF CARE
Problem: Adult Inpatient Plan of Care  Goal: Plan of Care Review  Outcome: Adequate for Care Transition  Goal: Patient-Specific Goal (Individualized)  Outcome: Adequate for Care Transition  Goal: Absence of Hospital-Acquired Illness or Injury  Outcome: Adequate for Care Transition  Goal: Optimal Comfort and Wellbeing  Outcome: Adequate for Care Transition  Goal: Readiness for Transition of Care  Outcome: Adequate for Care Transition     Problem: Diabetes Comorbidity  Goal: Blood Glucose Level Within Targeted Range  Outcome: Adequate for Care Transition     Problem: Infection  Goal: Absence of Infection Signs and Symptoms  Outcome: Adequate for Care Transition     Problem: Fall Injury Risk  Goal: Absence of Fall and Fall-Related Injury  Outcome: Adequate for Care Transition     Problem: Device-Related Complication Risk (Hemodialysis)  Goal: Safe, Effective Therapy Delivery  Outcome: Adequate for Care Transition     Problem: Hemodynamic Instability (Hemodialysis)  Goal: Effective Tissue Perfusion  Outcome: Adequate for Care Transition     Problem: Infection (Hemodialysis)  Goal: Absence of Infection Signs and Symptoms  Outcome: Adequate for Care Transition      No new complains overnight, anticipating dialysis today.

## 2022-02-16 NOTE — PLAN OF CARE
Problem: Adult Inpatient Plan of Care  Goal: Plan of Care Review  Outcome: Ongoing, Progressing  Flowsheets (Taken 2/16/2022 6419)  Plan of Care Reviewed With: patient

## 2022-02-16 NOTE — SUBJECTIVE & OBJECTIVE
This encounter was provided through telemedicine.  Patient was transferred to the telemedicine service on:  02/16/2022   The patient location is: [id number] A admitted 2/12/2022 11:26 PM.  Present with the patient at the time of the telemed/virtual assessment: Telepresenter    Interval History/Overnight Events:   Clinical record since admit reviewed.    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    Updated patient's daughter (with his consent); she plans to come to stay with him once he is discharged and can be in town as early as tomorrow pm; she is concerned he has been depressed for some time; she requested update regarding plans for lung rejection and transplant team to speak with her    Review of Systems   Constitutional: Positive for activity change and fatigue. Negative for fever.   HENT: Positive for voice change. Negative for trouble swallowing.    Respiratory: Positive for cough and shortness of breath.    Cardiovascular: Negative for chest pain.   Gastrointestinal: Negative for diarrhea and vomiting.        Inpatient Medications:  Scheduled Meds:   sodium chloride 0.9%   Intravenous Once    albuterol  2 puff Inhalation Q6H    amLODIPine  10 mg Oral Daily    apixaban  5 mg Oral BID    ascorbic acid (vitamin C)  500 mg Oral BID    atorvastatin  40 mg Oral Daily    carvediloL  3.125 mg Oral BID    cloNIDine  0.1 mg Oral TID    dexAMETHasone  6 mg Oral Daily    everolimus (immunosuppressive)  0.75 mg Oral Q12H    folic acid  1,000 mcg Oral  Daily    insulin aspart U-100  10 Units Subcutaneous TIDWM    insulin detemir U-100  26 Units Subcutaneous BID    multivitamin  1 tablet Oral Daily    mupirocin   Nasal BID    pantoprazole  40 mg Oral Daily    remdesivir infusion  100 mg Intravenous Daily    sevelamer carbonate  1,600 mg Oral TID WM    sodium bicarbonate  650 mg Oral BID    valsartan  160 mg Oral BID     Continuous Infusions:  PRN Meds:.dextrose 10%, dextrose 10%, glucagon (human recombinant), glucose, glucose, guaiFENesin, insulin aspart U-100, naloxone, sodium chloride 0.9%, sodium chloride 0.9%, sodium chloride 0.9%      Objective:     Temp:  [97.7 °F (36.5 °C)-98.3 °F (36.8 °C)] 98.2 °F (36.8 °C)  Pulse:  [78-95] 95  Resp:  [18] 18  SpO2:  [92 %-98 %] 97 %  BP: ()/(53-82) 107/59      Intake/Output Summary (Last 24 hours) at 2/16/2022 1652  Last data filed at 2/16/2022 1600  Gross per 24 hour   Intake 760 ml   Output --   Net 760 ml        Body mass index is 29.01 kg/m².    Physical Exam  Vitals and nursing note reviewed.   Constitutional:       General: He is not in acute distress.     Appearance: He is ill-appearing.   HENT:      Head: Normocephalic and atraumatic.      Right Ear: Hearing normal.      Left Ear: Hearing normal.      Nose: Nose normal.      Mouth/Throat:      Comments: Hoarse voice.  Eyes:      General: No scleral icterus.        Right eye: No discharge.         Left eye: No discharge.      Extraocular Movements: Extraocular movements intact.   Cardiovascular:      Rate and Rhythm: Normal rate.   Pulmonary:      Effort: Pulmonary effort is normal. No accessory muscle usage or respiratory distress.   Skin:     Findings: No rash.   Neurological:      General: No focal deficit present.      Mental Status: He is alert and oriented to person, place, and time.      Cranial Nerves: No cranial nerve deficit.      Motor: No weakness.   Psychiatric:         Attention and Perception: Attention normal.         Mood and Affect:  Mood normal.         Behavior: Behavior is cooperative.         Thought Content: Thought content normal.          Labs:  Recent Results (from the past 24 hour(s))   POCT glucose    Collection Time: 02/15/22 10:24 PM   Result Value Ref Range    POCT Glucose 111 (H) 70 - 110 mg/dL   Culture, Respiratory with Gram Stain    Collection Time: 02/15/22 10:45 PM    Specimen: Sputum, Expectorated; Respiratory   Result Value Ref Range    Gram Stain (Respiratory) Few Gram positive cocci     Gram Stain (Respiratory) Few Gram positive rods     Gram Stain (Respiratory) Few Gram negative rods     Gram Stain (Respiratory) Rare WBC's     Gram Stain (Respiratory) <10 epithelial cells per low power field.    Tacrolimus level    Collection Time: 02/16/22  3:25 AM   Result Value Ref Range    Tacrolimus Lvl 13.3 5.0 - 15.0 ng/mL   CBC with Automated Differential    Collection Time: 02/16/22  3:25 AM   Result Value Ref Range    WBC 10.55 3.90 - 12.70 K/uL    RBC 3.49 (L) 4.60 - 6.20 M/uL    Hemoglobin 10.3 (L) 14.0 - 18.0 g/dL    Hematocrit 32.5 (L) 40.0 - 54.0 %    MCV 93 82 - 98 fL    MCH 29.5 27.0 - 31.0 pg    MCHC 31.7 (L) 32.0 - 36.0 g/dL    RDW 13.2 11.5 - 14.5 %    Platelets 361 150 - 450 K/uL    MPV 9.7 9.2 - 12.9 fL    Immature Granulocytes 1.8 (H) 0.0 - 0.5 %    Gran # (ANC) 8.8 (H) 1.8 - 7.7 K/uL    Immature Grans (Abs) 0.19 (H) 0.00 - 0.04 K/uL    Lymph # 0.9 (L) 1.0 - 4.8 K/uL    Mono # 0.7 0.3 - 1.0 K/uL    Eos # 0.0 0.0 - 0.5 K/uL    Baso # 0.02 0.00 - 0.20 K/uL    nRBC 0 0 /100 WBC    Gran % 83.2 (H) 38.0 - 73.0 %    Lymph % 8.6 (L) 18.0 - 48.0 %    Mono % 6.2 4.0 - 15.0 %    Eosinophil % 0.0 0.0 - 8.0 %    Basophil % 0.2 0.0 - 1.9 %    Differential Method Automated    Comprehensive Metabolic Panel (CMP)    Collection Time: 02/16/22  3:25 AM   Result Value Ref Range    Sodium 131 (L) 136 - 145 mmol/L    Potassium 5.0 3.5 - 5.1 mmol/L    Chloride 92 (L) 95 - 110 mmol/L    CO2 16 (L) 23 - 29 mmol/L    Glucose 97 70 - 110  mg/dL     (H) 6 - 20 mg/dL    Creatinine 16.1 (H) 0.5 - 1.4 mg/dL    Calcium 9.1 8.7 - 10.5 mg/dL    Total Protein 6.1 6.0 - 8.4 g/dL    Albumin 2.8 (L) 3.5 - 5.2 g/dL    Total Bilirubin 0.4 0.1 - 1.0 mg/dL    Alkaline Phosphatase 57 55 - 135 U/L    AST 16 10 - 40 U/L    ALT 13 10 - 44 U/L    Anion Gap 23 (H) 8 - 16 mmol/L    eGFR if African American 3.3 (A) >60 mL/min/1.73 m^2    eGFR if non African American 2.9 (A) >60 mL/min/1.73 m^2   POCT glucose    Collection Time: 02/16/22  6:33 AM   Result Value Ref Range    POCT Glucose 132 (H) 70 - 110 mg/dL   POCT glucose    Collection Time: 02/16/22 11:34 AM   Result Value Ref Range    POCT Glucose 165 (H) 70 - 110 mg/dL        Lab Results   Component Value Date    [id number] POSITIVE (A) 02/11/2022       Recent Labs   Lab 02/14/22  0356 02/14/22  0356 02/15/22  0856 02/16/22  0325   WBC 12.59  --  11.16 10.55   LYMPH 11.8*  1.5   < > 9.6*  1.1 8.6*  0.9*   HGB 10.6*  --  9.6* 10.3*   HCT 33.2*  --  29.1* 32.5*     --  375 361    < > = values in this interval not displayed.     Recent Labs   Lab 02/13/22  0243 02/13/22  1219 02/14/22  0356 02/15/22  0856 02/16/22  0325   *   < > 136 131* 131*   K 4.4   < > 4.5 4.5 5.0   CL 95   < > 94* 91* 92*   CO2 21*   < > 20* 19* 16*   BUN 72*   < > 106* 126* 149*   CREATININE 10.6*   < > 12.5* 14.5* 16.1*   *   < > 109 153* 97   CALCIUM 9.2   < > 9.3 8.6* 9.1   MG 2.1  --  2.4 2.2  --    PHOS 7.9*  --  9.4* 10.7*  --     < > = values in this interval not displayed.     Recent Labs   Lab 02/13/22  0115 02/13/22  0243 02/13/22  0626 02/14/22  0356 02/15/22  0856 02/16/22  0325   ALKPHOS  --    < >  --  64 59 57   ALT  --    < >  --  15 10 13   AST  --    < >  --  18 14 16   ALBUMIN  --    < >  --  3.0* 2.6* 2.8*   PROT  --    < >  --  7.1 5.8* 6.1   BILITOT  --    < >  --  0.4 0.4 0.4   INR 1.0  --  1.0  --   --   --     < > = values in this interval not displayed.        Recent Labs     02/15/22  0053    DDIMER 1.69*   FERRITIN 3,625*   *       All labs within the last 24 hours were reviewed.     Microbiology:  Microbiology Results (last 7 days)     Procedure Component Value Units Date/Time    Culture, Respiratory with Gram Stain [[id number]] Collected: 02/15/22 2245    Order Status: Completed Specimen: Respiratory from Sputum, Expectorated Updated: 02/16/22 1019     Gram Stain (Respiratory) Few Gram positive cocci     Gram Stain (Respiratory) Few Gram positive rods     Gram Stain (Respiratory) Few Gram negative rods     Gram Stain (Respiratory) Rare WBC's     Gram Stain (Respiratory) <10 epithelial cells per low power field.    Blood culture (site 2) [[id number]] Collected: 02/13/22 0114    Order Status: Completed Specimen: Blood Updated: 02/16/22 0612     Blood Culture, Routine No Growth to date      No Growth to date      No Growth to date      No Growth to date    Narrative:      Site # 2, aerobic only    Blood culture (site 1) [[id number]] Collected: 02/13/22 0113    Order Status: Completed Specimen: Blood Updated: 02/16/22 0612     Blood Culture, Routine No Growth to date      No Growth to date      No Growth to date      No Growth to date    Narrative:      Site # 1, aerobic and anaerobic    Clostridium difficile EIA [[id number]]     Order Status: Canceled Specimen: Stool             Imaging      Results for orders placed during the hospital encounter of 02/12/22    Echo    Interpretation Summary  · Technically very difficult study  · The left ventricle is normal in size with concentric remodeling and low normal systolic function. The estimated ejection fraction is 50%.  · Normal left ventricular diastolic function.  · The right heart and IVC were not well visualized.      Echo  · Technically very difficult study  · The left ventricle is normal in size with concentric remodeling and low   normal systolic function. The estimated ejection fraction is 50%.  · Normal left ventricular diastolic function.  ·  The right heart and IVC were not well visualized.         All imaging within the last 24 hours was reviewed.       Discharge Planning   DERICK: 2/17/2022     Code Status: Full Code   Is the patient medically ready for discharge?: No    Reason for patient still in hospital (select all that apply): Patient trending condition, Laboratory test, Treatment and Consult recommendations  Discharge Plan A: Home Health

## 2022-02-16 NOTE — PT/OT/SLP PROGRESS
Physical Therapy      Patient Name:  Martin Hendrix JrTirso   MRN:  [id number]    Patient not seen today secondary to  (pt not seen but can meet POC with subsequent PT visits.). Will follow-up at a later date.    2/16/2022  .

## 2022-02-16 NOTE — PROGRESS NOTES
Arrived to room for bedside dialysis.  Bedside dialysis started via 15  Gauge fistula needles x 2 to LFA fistula. Isolation precautions maintained.

## 2022-02-16 NOTE — SUBJECTIVE & OBJECTIVE
Interval History:   BUN/sCr continue to worsen. HD not completed overnight. Appears comfortable on NC. No complaints this AM.     Review of patient's allergies indicates:  No Known Allergies  Current Facility-Administered Medications   Medication Frequency    0.9%  NaCl infusion Once    albuterol inhaler 2 puff Q6H    amLODIPine tablet 10 mg Daily    apixaban tablet 5 mg BID    ascorbic acid (vitamin C) tablet 500 mg BID    atorvastatin tablet 40 mg Daily    carvediloL tablet 3.125 mg BID    cloNIDine tablet 0.1 mg TID    dexAMETHasone tablet 6 mg Daily    dextrose 10% bolus 125 mL PRN    dextrose 10% bolus 250 mL PRN    everolimus (immunosuppressive) tablet 0.75 mg Q12H    folic acid tablet 1,000 mcg Daily    glucagon (human recombinant) injection 1 mg PRN    glucose chewable tablet 16 g PRN    glucose chewable tablet 24 g PRN    guaiFENesin 12 hr tablet 600 mg BID PRN    insulin aspart U-100 pen 1-10 Units QID (AC + HS) PRN    insulin aspart U-100 pen 10 Units TIDWM    insulin detemir U-100 pen 26 Units BID    multivitamin tablet Daily    mupirocin 2 % ointment BID    naloxone 0.4 mg/mL injection 0.02 mg PRN    pantoprazole EC tablet 40 mg Daily    remdesivir 100 mg in sodium chloride 0.9% 250 mL infusion Daily    sevelamer carbonate tablet 1,600 mg TID WM    sodium bicarbonate tablet 650 mg BID    sodium chloride 0.9% bolus 250 mL PRN    sodium chloride 0.9% flush 10 mL Q12H PRN    sodium chloride 0.9% flush 10 mL PRN    valsartan tablet 160 mg BID     Facility-Administered Medications Ordered in Other Encounters   Medication Frequency    0.9%  NaCl infusion Continuous    cefazolin (ANCEF) 2 gram in dextrose 5% 50 mL IVPB (premix) On Call Procedure       Objective:     Vital Signs (Most Recent):  Temp: 97.8 °F (36.6 °C) (02/16/22 0355)  Pulse: 84 (02/16/22 0731)  Resp: 18 (02/16/22 0820)  BP: 113/67 (02/16/22 0820)  SpO2: 97 % (02/16/22 0731)  O2 Device (Oxygen Therapy): nasal  cannula (02/16/22 0731) Vital Signs (24h Range):  Temp:  [96.4 °F (35.8 °C)-98.3 °F (36.8 °C)] 97.8 °F (36.6 °C)  Pulse:  [76-84] 84  Resp:  [18-20] 18  SpO2:  [92 %-98 %] 97 %  BP: (113-149)/(60-82) 113/67     Weight: 94.3 kg (208 lb) (02/14/22 1138)  Body mass index is 29.01 kg/m².  Body surface area is 2.17 meters squared.    I/O last 3 completed shifts:  In: 400 [P.O.:400]  Out: 100 [Urine:100]    Physical Exam  Vitals reviewed.   Constitutional:       General: He is not in acute distress.     Appearance: He is well-developed.   HENT:      Head: Normocephalic and atraumatic.   Eyes:      Pupils: Pupils are equal, round, and reactive to light.   Neck:      Vascular: No JVD.   Cardiovascular:      Rate and Rhythm: Normal rate and regular rhythm.      Heart sounds: Normal heart sounds. No murmur heard.      Pulmonary:      Effort: Pulmonary effort is normal. No respiratory distress.      Breath sounds: Normal breath sounds. No wheezing or rales.   Abdominal:      General: Bowel sounds are normal. There is no distension.      Palpations: Abdomen is soft.      Tenderness: There is no abdominal tenderness.   Musculoskeletal:         General: No deformity. Normal range of motion.      Cervical back: Normal range of motion and neck supple.      Comments: LUE brachiocephalic AVF with good thrill and bruit.   Skin:     General: Skin is warm.   Neurological:      Mental Status: He is alert and oriented to person, place, and time.         Significant Labs:  CBC:   Recent Labs   Lab 02/16/22  0325   WBC 10.55   RBC 3.49*   HGB 10.3*   HCT 32.5*      MCV 93   MCH 29.5   MCHC 31.7*     CMP:   Recent Labs   Lab 02/16/22  0325   GLU 97   CALCIUM 9.1   ALBUMIN 2.8*   PROT 6.1   *   K 5.0   CO2 16*   CL 92*   *   CREATININE 16.1*   ALKPHOS 57   ALT 13   AST 16   BILITOT 0.4     All labs within the past 24 hours have been reviewed.

## 2022-02-16 NOTE — CONSULTS
Thank you for your consult to [facility]. We have reviewed the patient chart. This patient does meet criteria for [facility] service at this time. Will assume care on 02/16/22 at 7AM.    Emma Griggs MD

## 2022-02-16 NOTE — ASSESSMENT & PLAN NOTE
ESRD on IHD  TTS  -  Last iHD 2/12 per patient  Out patient HD [facility] - [facility]/ Dr. Zenia Peacock.  Duration of outpatient dialysis session - 4 hrs  EDW: per patient was 98.5-99 kg but might be lower he states as he has lost weight.  Residual Renal Function (Urine Output) - reports ~250-400 cc per day  Access: LUE brachiocephalic fistula, created 10/13/2021 by Dr Thomason    End-Stage Renal Disease on HD  - HD not completed overnight due to high patient census and the need to triage. However, patient will be given priority today.    - Will provide dialysis for metabolic clearance and volume management today. Will use low BFR/DFRs given elevated BUN over 3 hrs.   - Target ultrafiltration:  ~2-3 L  - Dialysate adjusted to current labs   - Avoid nephrotoxic medications  - Medications to renal parameters  - Strict Input and Output and chart  - Daily standing weights    Active Problem in Hospital: COVID pneumonia    Anemia of Chronic Kidney Disease   - Hb > 7 gm/dL  - Will resume ROGELIO as outpatient   - Adequate iron stores as per most recent profile   - Will request iron studies to assess further needs of supplementation     Mineral Bone Disease in CKD   - Renal Function Panel Daily for electrolytes monitoring  - Already on binders as outpatient, will continue    HTN   - BP stable, will continue to monitor. Goal for BP is <140 mmHg SBP and BDP <90 mmHg, maintain MAP > 65.    Nutrition   - Renal Diet

## 2022-02-16 NOTE — PROGRESS NOTES
[facility] ([place] [id number])  Nephrology  Progress Note    Patient Name: Martin Hendrix Jr.  MRN: [id number]  Admission Date: 2/12/2022  Hospital Length of Stay: 4 days  Attending Provider: Emma Griggs MD   Primary Care Physician: Michel Henley MD  Principal Problem:COVID-19    Subjective:     Interval History:   BUN/sCr continue to worsen. HD not completed overnight. Appears comfortable on NC. No complaints this AM.     Review of patient's allergies indicates:  No Known Allergies  Current Facility-Administered Medications   Medication Frequency    0.9%  NaCl infusion Once    albuterol inhaler 2 puff Q6H    amLODIPine tablet 10 mg Daily    apixaban tablet 5 mg BID    ascorbic acid (vitamin C) tablet 500 mg BID    atorvastatin tablet 40 mg Daily    carvediloL tablet 3.125 mg BID    cloNIDine tablet 0.1 mg TID    dexAMETHasone tablet 6 mg Daily    dextrose 10% bolus 125 mL PRN    dextrose 10% bolus 250 mL PRN    everolimus (immunosuppressive) tablet 0.75 mg Q12H    folic acid tablet 1,000 mcg Daily    glucagon (human recombinant) injection 1 mg PRN    glucose chewable tablet 16 g PRN    glucose chewable tablet 24 g PRN    guaiFENesin 12 hr tablet 600 mg BID PRN    insulin aspart U-100 pen 1-10 Units QID (AC + HS) PRN    insulin aspart U-100 pen 10 Units TIDWM    insulin detemir U-100 pen 26 Units BID    multivitamin tablet Daily    mupirocin 2 % ointment BID    naloxone 0.4 mg/mL injection 0.02 mg PRN    pantoprazole EC tablet 40 mg Daily    remdesivir 100 mg in sodium chloride 0.9% 250 mL infusion Daily    sevelamer carbonate tablet 1,600 mg TID WM    sodium bicarbonate tablet 650 mg BID    sodium chloride 0.9% bolus 250 mL PRN    sodium chloride 0.9% flush 10 mL Q12H PRN    sodium chloride 0.9% flush 10 mL PRN    valsartan tablet 160 mg BID     Facility-Administered Medications Ordered in Other Encounters   Medication Frequency    0.9%  NaCl infusion Continuous     cefazolin (ANCEF) 2 gram in dextrose 5% 50 mL IVPB (premix) On Call Procedure       Objective:     Vital Signs (Most Recent):  Temp: 97.8 °F (36.6 °C) (02/16/22 0355)  Pulse: 84 (02/16/22 0731)  Resp: 18 (02/16/22 0820)  BP: 113/67 (02/16/22 0820)  SpO2: 97 % (02/16/22 0731)  O2 Device (Oxygen Therapy): nasal cannula (02/16/22 0731) Vital Signs (24h Range):  Temp:  [96.4 °F (35.8 °C)-98.3 °F (36.8 °C)] 97.8 °F (36.6 °C)  Pulse:  [76-84] 84  Resp:  [18-20] 18  SpO2:  [92 %-98 %] 97 %  BP: (113-149)/(60-82) 113/67     Weight: 94.3 kg (208 lb) (02/14/22 1138)  Body mass index is 29.01 kg/m².  Body surface area is 2.17 meters squared.    I/O last 3 completed shifts:  In: 400 [P.O.:400]  Out: 100 [Urine:100]    Physical Exam  Vitals reviewed.   Constitutional:       General: He is not in acute distress.     Appearance: He is well-developed.   HENT:      Head: Normocephalic and atraumatic.   Eyes:      Pupils: Pupils are equal, round, and reactive to light.   Neck:      Vascular: No JVD.   Cardiovascular:      Rate and Rhythm: Normal rate and regular rhythm.      Heart sounds: Normal heart sounds. No murmur heard.      Pulmonary:      Effort: Pulmonary effort is normal. No respiratory distress.      Breath sounds: Normal breath sounds. No wheezing or rales.   Abdominal:      General: Bowel sounds are normal. There is no distension.      Palpations: Abdomen is soft.      Tenderness: There is no abdominal tenderness.   Musculoskeletal:         General: No deformity. Normal range of motion.      Cervical back: Normal range of motion and neck supple.      Comments: LUE brachiocephalic AVF with good thrill and bruit.   Skin:     General: Skin is warm.   Neurological:      Mental Status: He is alert and oriented to person, place, and time.         Significant Labs:  CBC:   Recent Labs   Lab 02/16/22  0325   WBC 10.55   RBC 3.49*   HGB 10.3*   HCT 32.5*      MCV 93   MCH 29.5   MCHC 31.7*     CMP:   Recent Labs   Lab  02/16/22  0325   GLU 97   CALCIUM 9.1   ALBUMIN 2.8*   PROT 6.1   *   K 5.0   CO2 16*   CL 92*   *   CREATININE 16.1*   ALKPHOS 57   ALT 13   AST 16   BILITOT 0.4     All labs within the past 24 hours have been reviewed.       Assessment/Plan:     * COVID-19  Per primary.    ESRD (end stage renal disease)  ESRD on IHD  TTS  -  Last iHD 2/12 per patient  Out patient [facility] - [facility]/ Dr. Zenia Peacock.  Duration of outpatient dialysis session - 4 hrs  EDW: per patient was 98.5-99 kg but might be lower he states as he has lost weight.  Residual Renal Function (Urine Output) - reports ~250-400 cc per day  Access: LUE brachiocephalic fistula, created 10/13/2021 by Dr Thomason    End-Stage Renal Disease on HD  - HD not completed overnight due to high patient census and the need to triage. However, patient will be given priority today.    - Will provide dialysis for metabolic clearance and volume management today. Will use low BFR/DFRs given elevated BUN over 3 hrs.   - Target ultrafiltration:  ~2-3 L  - Dialysate adjusted to current labs   - Avoid nephrotoxic medications  - Medications to renal parameters  - Strict Input and Output and chart  - Daily standing weights    Active Problem in Hospital: COVID pneumonia    Anemia of Chronic Kidney Disease   - Hb > 7 gm/dL  - Will resume ROGELIO as outpatient   - Adequate iron stores as per most recent profile   - Will request iron studies to assess further needs of supplementation     Mineral Bone Disease in CKD   - Renal Function Panel Daily for electrolytes monitoring  - Already on binders as outpatient, will continue    HTN   - BP stable, will continue to monitor. Goal for BP is <140 mmHg SBP and BDP <90 mmHg, maintain MAP > 65.    Nutrition   - Renal Diet            Thank you for your consult. I will follow-up with patient. Please contact us if you have any additional questions.    Cait Finley, GURPREET, FNP-C  Nephrology  [facility] ([facility]

## 2022-02-17 ENCOUNTER — PATIENT MESSAGE (OUTPATIENT)
Dept: ADMINISTRATIVE | Facility: CLINIC | Age: 62
End: 2022-02-17
Payer: MEDICARE

## 2022-02-17 ENCOUNTER — TELEPHONE (OUTPATIENT)
Dept: TRANSPLANT | Facility: HOSPITAL | Age: 62
End: 2022-02-17
Payer: MEDICARE

## 2022-02-17 ENCOUNTER — PATIENT MESSAGE (OUTPATIENT)
Dept: TRANSPLANT | Facility: CLINIC | Age: 62
End: 2022-02-17
Payer: MEDICARE

## 2022-02-17 VITALS
HEIGHT: 71 IN | SYSTOLIC BLOOD PRESSURE: 112 MMHG | TEMPERATURE: 98 F | DIASTOLIC BLOOD PRESSURE: 59 MMHG | HEART RATE: 99 BPM | OXYGEN SATURATION: 95 % | WEIGHT: 208 LBS | RESPIRATION RATE: 18 BRPM | BODY MASS INDEX: 29.12 KG/M2

## 2022-02-17 LAB
ALBUMIN SERPL BCP-MCNC: 2.7 G/DL (ref 3.5–5.2)
ALP SERPL-CCNC: 66 U/L (ref 55–135)
ALT SERPL W/O P-5'-P-CCNC: 16 U/L (ref 10–44)
ANION GAP SERPL CALC-SCNC: 17 MMOL/L (ref 8–16)
AST SERPL-CCNC: 21 U/L (ref 10–40)
BILIRUB SERPL-MCNC: 0.5 MG/DL (ref 0.1–1)
BUN SERPL-MCNC: 93 MG/DL (ref 6–20)
CALCIUM SERPL-MCNC: 8.7 MG/DL (ref 8.7–10.5)
CHLORIDE SERPL-SCNC: 88 MMOL/L (ref 95–110)
CO2 SERPL-SCNC: 25 MMOL/L (ref 23–29)
CREAT SERPL-MCNC: 11.1 MG/DL (ref 0.5–1.4)
D DIMER PPP IA.FEU-MCNC: 2.25 MG/L FEU
EST. GFR  (AFRICAN AMERICAN): 5.2 ML/MIN/1.73 M^2
EST. GFR  (NON AFRICAN AMERICAN): 4.5 ML/MIN/1.73 M^2
FERRITIN SERPL-MCNC: 3113 NG/ML (ref 20–300)
GLUCOSE SERPL-MCNC: 233 MG/DL (ref 70–110)
HAV IGM SERPL QL IA: NEGATIVE
HBV CORE AB SERPL QL IA: NEGATIVE
HBV SURFACE AG SERPL QL IA: NEGATIVE
LDH SERPL L TO P-CCNC: 302 U/L (ref 110–260)
POCT GLUCOSE: 187 MG/DL (ref 70–110)
POCT GLUCOSE: 74 MG/DL (ref 70–110)
POCT GLUCOSE: 99 MG/DL (ref 70–110)
POTASSIUM SERPL-SCNC: 4.4 MMOL/L (ref 3.5–5.1)
PROT SERPL-MCNC: 5.9 G/DL (ref 6–8.4)
SODIUM SERPL-SCNC: 130 MMOL/L (ref 136–145)
TACROLIMUS BLD-MCNC: 8.5 NG/ML (ref 5–15)

## 2022-02-17 PROCEDURE — 63600175 PHARM REV CODE 636 W HCPCS: Performed by: STUDENT IN AN ORGANIZED HEALTH CARE EDUCATION/TRAINING PROGRAM

## 2022-02-17 PROCEDURE — 80197 ASSAY OF TACROLIMUS: CPT | Performed by: STUDENT IN AN ORGANIZED HEALTH CARE EDUCATION/TRAINING PROGRAM

## 2022-02-17 PROCEDURE — 82728 ASSAY OF FERRITIN: CPT | Performed by: STUDENT IN AN ORGANIZED HEALTH CARE EDUCATION/TRAINING PROGRAM

## 2022-02-17 PROCEDURE — 86704 HEP B CORE ANTIBODY TOTAL: CPT | Performed by: INTERNAL MEDICINE

## 2022-02-17 PROCEDURE — 94640 AIRWAY INHALATION TREATMENT: CPT

## 2022-02-17 PROCEDURE — 25000003 PHARM REV CODE 250: Performed by: STUDENT IN AN ORGANIZED HEALTH CARE EDUCATION/TRAINING PROGRAM

## 2022-02-17 PROCEDURE — 87340 HEPATITIS B SURFACE AG IA: CPT | Performed by: INTERNAL MEDICINE

## 2022-02-17 PROCEDURE — 1111F PR DISCHARGE MEDS RECONCILED W/ CURRENT OUTPATIENT MED LIST: ICD-10-PCS | Mod: 95,CPTII,, | Performed by: INTERNAL MEDICINE

## 2022-02-17 PROCEDURE — 36415 COLL VENOUS BLD VENIPUNCTURE: CPT | Performed by: STUDENT IN AN ORGANIZED HEALTH CARE EDUCATION/TRAINING PROGRAM

## 2022-02-17 PROCEDURE — 99239 HOSP IP/OBS DSCHRG MGMT >30: CPT | Mod: 95,,, | Performed by: INTERNAL MEDICINE

## 2022-02-17 PROCEDURE — 36415 COLL VENOUS BLD VENIPUNCTURE: CPT | Performed by: INTERNAL MEDICINE

## 2022-02-17 PROCEDURE — 80053 COMPREHEN METABOLIC PANEL: CPT

## 2022-02-17 PROCEDURE — 86709 HEPATITIS A IGM ANTIBODY: CPT | Performed by: INTERNAL MEDICINE

## 2022-02-17 PROCEDURE — 27000646 HC AEROBIKA DEVICE

## 2022-02-17 PROCEDURE — 80100014 HC HEMODIALYSIS 1:1

## 2022-02-17 PROCEDURE — 36415 COLL VENOUS BLD VENIPUNCTURE: CPT

## 2022-02-17 PROCEDURE — 85379 FIBRIN DEGRADATION QUANT: CPT | Performed by: STUDENT IN AN ORGANIZED HEALTH CARE EDUCATION/TRAINING PROGRAM

## 2022-02-17 PROCEDURE — 94664 DEMO&/EVAL PT USE INHALER: CPT

## 2022-02-17 PROCEDURE — 97110 THERAPEUTIC EXERCISES: CPT

## 2022-02-17 PROCEDURE — 94761 N-INVAS EAR/PLS OXIMETRY MLT: CPT

## 2022-02-17 PROCEDURE — 99900035 HC TECH TIME PER 15 MIN (STAT)

## 2022-02-17 PROCEDURE — 94799 UNLISTED PULMONARY SVC/PX: CPT

## 2022-02-17 PROCEDURE — 99239 PR HOSPITAL DISCHARGE DAY,>30 MIN: ICD-10-PCS | Mod: 95,,, | Performed by: INTERNAL MEDICINE

## 2022-02-17 PROCEDURE — 1111F DSCHRG MED/CURRENT MED MERGE: CPT | Mod: 95,CPTII,, | Performed by: INTERNAL MEDICINE

## 2022-02-17 PROCEDURE — 27000221 HC OXYGEN, UP TO 24 HOURS

## 2022-02-17 PROCEDURE — 97535 SELF CARE MNGMENT TRAINING: CPT

## 2022-02-17 PROCEDURE — 83615 LACTATE (LD) (LDH) ENZYME: CPT | Performed by: STUDENT IN AN ORGANIZED HEALTH CARE EDUCATION/TRAINING PROGRAM

## 2022-02-17 RX ORDER — HEPARIN SODIUM 1000 [USP'U]/ML
1000 INJECTION, SOLUTION INTRAVENOUS; SUBCUTANEOUS
Status: DISCONTINUED | OUTPATIENT
Start: 2022-02-17 | End: 2022-02-17 | Stop reason: HOSPADM

## 2022-02-17 RX ORDER — INSULIN DEGLUDEC 200 U/ML
30 INJECTION, SOLUTION SUBCUTANEOUS NIGHTLY
Qty: 6 PEN | Refills: 4 | Status: SHIPPED | OUTPATIENT
Start: 2022-02-17 | End: 2022-01-01

## 2022-02-17 RX ORDER — SODIUM CHLORIDE 9 MG/ML
INJECTION, SOLUTION INTRAVENOUS ONCE
Status: DISCONTINUED | OUTPATIENT
Start: 2022-02-17 | End: 2022-02-17 | Stop reason: HOSPADM

## 2022-02-17 RX ORDER — TACROLIMUS 0.5 MG/1
1 CAPSULE ORAL EVERY 12 HOURS
Qty: 360 CAPSULE | Refills: 11 | Status: SHIPPED | OUTPATIENT
Start: 2022-02-17 | End: 2022-03-03

## 2022-02-17 RX ADMIN — ALBUTEROL SULFATE 2 PUFF: 108 INHALANT RESPIRATORY (INHALATION) at 07:02

## 2022-02-17 RX ADMIN — CLONIDINE HYDROCHLORIDE 0.1 MG: 0.1 TABLET ORAL at 08:02

## 2022-02-17 RX ADMIN — INSULIN ASPART 10 UNITS: 100 INJECTION, SOLUTION INTRAVENOUS; SUBCUTANEOUS at 08:02

## 2022-02-17 RX ADMIN — INSULIN ASPART 2 UNITS: 100 INJECTION, SOLUTION INTRAVENOUS; SUBCUTANEOUS at 08:02

## 2022-02-17 RX ADMIN — APIXABAN 5 MG: 5 TABLET, FILM COATED ORAL at 08:02

## 2022-02-17 RX ADMIN — DEXAMETHASONE 6 MG: 4 TABLET ORAL at 08:02

## 2022-02-17 RX ADMIN — MUPIROCIN: 20 OINTMENT TOPICAL at 08:02

## 2022-02-17 RX ADMIN — SODIUM BICARBONATE 650 MG TABLET 650 MG: at 08:02

## 2022-02-17 RX ADMIN — THERA TABS 1 TABLET: TAB at 08:02

## 2022-02-17 RX ADMIN — ALBUTEROL SULFATE 2 PUFF: 108 INHALANT RESPIRATORY (INHALATION) at 12:02

## 2022-02-17 RX ADMIN — AMLODIPINE BESYLATE 10 MG: 10 TABLET ORAL at 08:02

## 2022-02-17 RX ADMIN — EVEROLIMUS 0.75 MG: 0.75 TABLET ORAL at 08:02

## 2022-02-17 RX ADMIN — SEVELAMER CARBONATE 1600 MG: 800 TABLET, FILM COATED ORAL at 08:02

## 2022-02-17 RX ADMIN — REMDESIVIR 100 MG: 100 INJECTION, POWDER, LYOPHILIZED, FOR SOLUTION INTRAVENOUS at 09:02

## 2022-02-17 RX ADMIN — CARVEDILOL 3.12 MG: 3.12 TABLET, FILM COATED ORAL at 08:02

## 2022-02-17 RX ADMIN — EVEROLIMUS 0.75 MG: 0.75 TABLET ORAL at 05:02

## 2022-02-17 RX ADMIN — OXYCODONE HYDROCHLORIDE AND ACETAMINOPHEN 500 MG: 500 TABLET ORAL at 08:02

## 2022-02-17 RX ADMIN — VALSARTAN 160 MG: 160 TABLET, FILM COATED ORAL at 08:02

## 2022-02-17 RX ADMIN — SEVELAMER CARBONATE 1600 MG: 800 TABLET, FILM COATED ORAL at 12:02

## 2022-02-17 RX ADMIN — FOLIC ACID 1000 MCG: 1 TABLET ORAL at 08:02

## 2022-02-17 RX ADMIN — ATORVASTATIN CALCIUM 40 MG: 20 TABLET, FILM COATED ORAL at 08:02

## 2022-02-17 RX ADMIN — PANTOPRAZOLE SODIUM 40 MG: 40 TABLET, DELAYED RELEASE ORAL at 08:02

## 2022-02-17 NOTE — PT/OT/SLP PROGRESS
Occupational Therapy   Treatment    Name: Martin Hendrix Jr.  MRN: [id number]  Admitting Diagnosis:  COVID-19       Recommendations:     Discharge Recommendations: home health OT,home health PT  Discharge Equipment Recommendations:  shower chair (for temporary use)  Barriers to discharge:  None    Assessment:     Martin Hendrix Jr. is a 61 y.o. male with a medical diagnosis of COVID-19.  He presents with decreased tolerance for ADL performance this date. Pt requires increased time to complete BADL 2* frequent rest breaks needed for O2 recovery. Pt completed functional mobility to bathroom with SBA >> CGA. Pt visibly winded and SpO2 86% standing at sink. Pt sat and eventually recovered to 91%, but unable to tolerate standing ADL. Performance deficits affecting function are weakness,impaired functional mobilty,gait instability,impaired endurance,impaired balance,impaired self care skills,impaired cardiopulmonary response to activity.     Rehab Prognosis:  Good; patient would benefit from acute skilled OT services to address these deficits and reach maximum level of function.       Plan:     Patient to be seen 2 x/week to address the above listed problems via self-care/home management,therapeutic activities,therapeutic exercises  · Plan of Care Expires: 03/13/22  · Plan of Care Reviewed with: patient    Subjective     Pain/Comfort:  · Pain Rating 1: 0/10  · Pain Rating Post-Intervention 1: 0/10    Objective:     Communicated with: RN prior to session.  Patient found up in chair with telemetry,pulse ox (continuous),oxygen (Midline) upon OT entry to room.    General Precautions: Standard, airborne,contact,droplet,fall   Orthopedic Precautions:N/A   Braces:    Respiratory Status: Nasal cannula, flow 2 L/min     Occupational Performance:     Bed Mobility:    · NT     Functional Mobility/Transfers:  · Patient completed Sit <> Stand Transfer with stand by assistance  with  no assistive device   · Patient completed Toilet  Transfer Step Transfer and STS technique with contact guard assistance with  no AD  · Functional Mobility: SBA to bathroom digressing to CGA 2* SOB and one LOB    Activities of Daily Living:  · Grooming: independence seated in chair  · Upper Body Dressing: set-up A    · Lower Body Dressing: minimum assistance      AMPA 6 Click ADL: 20    Treatment & Education:  Pt ed on OT POC  Pt ed on safety with ADL and mobility and current need for assistance 2* SOB and LOB  Pt ed on ROM ex's 3x daily for increased overall strength and endurance    Patient left up in chair with all lines intact, call button in reach and RN notifiedEducation:      GOALS:   Multidisciplinary Problems     Occupational Therapy Goals        Problem: Occupational Therapy Goal    Goal Priority Disciplines Outcome Interventions   Occupational Therapy Goal     OT, PT/OT Ongoing, Progressing    Description: Goals to be met by:  2/28/2022    Patient will increase functional independence with ADLs by performing:    Feeding with [place].  UE Dressing with [place].  LE Dressing with Modified [place].  Grooming while standing at sink with [place].  Toileting from toilet with [place] for hygiene and clothing management.   Bathing from  shower chair/bench with Modified [place].  Toilet transfer to toilet with Modified [place].                     Time Tracking:     OT Date of Treatment: 02/17/22  OT Start Time: 0754  OT Stop Time: 0818  OT Total Time (min): 24 min    Billable Minutes:Self Care/Home Management 24               2/17/2022

## 2022-02-17 NOTE — CARE UPDATE
DEV completed a referral for temporary transfer of HD location for pt with current company, [employer], due to positive COVID-19 result. Will continue to follow.     Perla Head  Nephrology DEV  Ext. [phone]

## 2022-02-17 NOTE — PLAN OF CARE
[facility] ([place] [id number])      HOME HEALTH ORDERS  FACE TO FACE ENCOUNTER    Patient Name: Martin Hendrix Jr.  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  Uncontrolled. BP Reviewed.  Compliant with BP medications. No SE reported.   (-) CP, SOB, palpitations, dizziness, lightheadedness, HA, arm numbness, tingling or weakness, syncope.  Creatinine   Date Value Ref Range Status   07/08/2020 3.1 (H) 0.5 - 1.4 mg/dL Final           Mixed hyperlipidemia [E78.2]  Yes    CMV (cytomegalovirus infection) status positive [B25.9]  Yes    Type 2 diabetes mellitus with hyperglycemia, with long-term current use of insulin [E11.65, Z79.4]  Not Applicable     Chronic     Initial HPI:  Reviewed Diabetes Management StatusStatin:TakingACE/ARB: Taking  December 2020:  Reviewed Diabetes Management Status  Patient reports taking Humalog sliding scale as needed in addition to long-acting insulin.  Statin: Taking  ACE/ARB: Taking    Screening or Prevention Patient's value Goal Complete/Controlled?   HgA1C Testing and Control   Lab Results   Component Value Date    HGBA1C 13.3 (H) 10/13/2020      Annually/Less than 8% No   Lipid profile : 10/13/2020 Annually Yes   LDL control Lab Results   Component Value Date    LDLCALC 197.2 (H) 10/13/2020    Annually/Less than 100 mg/dl  No   Nephropathy screening Lab Results   Component Value Date    LABMICR 2964.0 06/01/2020     Lab Results   Component Value Date    PROTEINUA 3+ (A) 10/13/2020    Annually Yes   Blood pressure BP Readings from Last 1  Encounters:   12/14/20 (!) 148/91    Less than 140/90 No   Dilated retinal exam : 12/13/2017 Annually No   Foot exam   : 07/21/2020 Annually Yes           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       Follow Up Appointments:  Future Appointments   Date Time Provider Department Center   2/21/2022  7:50 AM LAB, APPOINTMENT [place] [facility] LAB VNP [facility]   2/21/2022  8:30 AM DERMATOLOGY RESIDENT CLINIC [facility] DERM [facility]   2/24/2022  9:40 AM Serg Lerma NP [facility] MED [place]   3/8/2022  7:45 AM NOM OIC-XRAY [facility] XRAY [facility]   3/8/2022  8:20 AM LAB, TRANSPLANT [facility] LABTX [facility]   3/8/2022  8:40 AM SPECIMEN LAB, TRANSPLANT [facility]TX [facility]   3/8/2022  9:30 AM PULMONARY FUNCTION [facility] PULMLAB [facility]   3/8/2022  9:45 AM PULMONARY FUNCTION [facility] PULMLAB [facility]   3/8/2022 10:00 AM Robina Mcdonald MD [facility] LUNGTX [facility]   4/25/2022  8:30 AM VASCULAR, LAB [facility] VASCLAB [facility]   4/25/2022  9:00 AM CARLI Thomason II, MD [facility] VASCSUR [facility]   9/14/2022 10:20 AM Michel Henley MD [facility]       Allergies:Review of patient's allergies indicates:  No Known Allergies    Medications: Review discharge medications with patient and family and provide education.    Current Facility-Administered Medications   Medication Dose Route Frequency Provider Last Rate Last Admin    0.9%  NaCl infusion   Intravenous Once Cris Viramontes MD        albuterol inhaler 2 puff  2 puff Inhalation Q6H Sophie Jones MD   2 puff at 02/17/22 1223    amLODIPine tablet 10 mg  10 mg Oral Daily Sophie Jones MD   10 mg at 02/17/22 0852    apixaban tablet 5 mg  5 mg Oral BID Susanna Yi MD   5 mg at 02/17/22 0852    ascorbic acid (vitamin C) tablet 500 mg  500 mg Oral BID Sophie Jones MD   500 mg at 02/17/22 0850    atorvastatin  tablet 40 mg  40 mg Oral Daily Sophie Jones MD   40 mg at 02/17/22 0851    [START ON 2/18/2022] azithromycin tablet 250 mg  250 mg Oral Every Mon, Wed, Fri Emma Griggs MD        benzonatate capsule 100 mg  100 mg Oral TID PRN Emma Griggs MD        carvediloL tablet 3.125 mg  3.125 mg Oral BID Sophie Jones MD   3.125 mg at 02/17/22 0851    cloNIDine tablet 0.1 mg  0.1 mg Oral TID Sophie Jones MD   0.1 mg at 02/17/22 0852    dexAMETHasone tablet 6 mg  6 mg Oral Daily Sophie Jones MD   6 mg at 02/17/22 0852    dextrose 10% bolus 125 mL  12.5 g Intravenous PRN Mike Cordoba MD        dextrose 10% bolus 250 mL  25 g Intravenous PRN Mike Cordoba MD        everolimus (immunosuppressive) tablet 0.75 mg  0.75 mg Oral Q12H Sophie Jones MD   0.75 mg at 02/17/22 0851    folic acid tablet 1,000 mcg  1,000 mcg Oral Daily Sophie Jones MD   1,000 mcg at 02/17/22 0852    glucagon (human recombinant) injection 1 mg  1 mg Intramuscular PRN Maximino Rangel MD        glucose chewable tablet 16 g  16 g Oral PRN Maximino Rangel MD        glucose chewable tablet 24 g  24 g Oral PRN Maximino Rangel MD        guaiFENesin 12 hr tablet 600 mg  600 mg Oral BID PRN Maximino Rangel MD        heparin (porcine) injection 1,000 Units  1,000 Units Intra-Catheter PRN Cris Viramontes MD        insulin aspart U-100 pen 1-10 Units  1-10 Units Subcutaneous QID (AC + HS) PRN Maximino Rangel MD   2 Units at 02/17/22 0856    insulin aspart U-100 pen 10 Units  10 Units Subcutaneous TIDWM Maximino Rangel MD   10 Units at 02/17/22 0856    insulin detemir U-100 pen 30 Units  30 Units Subcutaneous BID Dorothea Wu PA-C   30 Units at 02/17/22 0855    multivitamin tablet  1 tablet Oral Daily Sophie Jones MD   1 tablet at 02/17/22 0852    mupirocin 2 % ointment   Nasal BID Mike Cordoba MD   Given at 02/17/22 0852    naloxone 0.4 mg/mL injection 0.02 mg  0.02 mg  Intravenous PRN Sophie Jones MD        pantoprazole EC tablet 40 mg  40 mg Oral Daily Sophie Jones MD   40 mg at 02/17/22 0852    sevelamer carbonate tablet 1,600 mg  1,600 mg Oral TID  Mike Cordoba MD   1,600 mg at 02/17/22 1233    sodium bicarbonate tablet 650 mg  650 mg Oral BID Sophie Jones MD   650 mg at 02/17/22 0850    sodium chloride 0.9% bolus 250 mL  250 mL Intravenous PRN Cris Viramontes MD        sodium chloride 0.9% bolus 250 mL  250 mL Intravenous PRN Cris Viramontes MD        sodium chloride 0.9% flush 10 mL  10 mL Intravenous Q12H PRN Sophie Jones MD        sodium chloride 0.9% flush 10 mL  10 mL Intravenous PRN Sophie Jones MD        [START ON 2/18/2022] sulfamethoxazole-trimethoprim 400-80mg per tablet 1 tablet  1 tablet Oral Every Mon, Wed, Fri Emma Griggs MD        valsartan tablet 160 mg  160 mg Oral BID Sophie Jones MD   160 mg at 02/17/22 0852     Facility-Administered Medications Ordered in Other Encounters   Medication Dose Route Frequency Provider Last Rate Last Admin    0.9%  NaCl infusion   Intravenous Continuous Cait Yan MD        cefazolin (ANCEF) 2 gram in dextrose 5% 50 mL IVPB (premix)  2 g Intravenous On Call Procedure Cait Yan MD         Current Discharge Medication List      START taking these medications    Details   pulse oximeter (PULSE OXIMETER) device by Apply Externally route 2 (two) times a day. Use twice daily at 8 AM and 3 PM and record the value in [employer] as directed.  Qty: 1 each, Refills: 0    Comments: This is a NO CHARGE item.  Please override price to zero.  DO NOT PRINT.  NORMAL MODE e-PRESCRIBE ONLY.         CONTINUE these medications which have CHANGED    Details   azithromycin (Z-REYNA) 250 MG tablet Take 2 tablets (500 mg total) by mouth every Mon, Wed, Fri.  Qty: 30 tablet, Refills: 11      insulin degludec (TRESIBA FLEXTOUCH U-200) 200 unit/mL (3 mL) insulin pen Inject 30 Units into the skin every  evening.  Qty: 6 pen, Refills: 4    Associated Diagnoses: Type 2 diabetes mellitus with hyperglycemia, with long-term current use of insulin      tacrolimus (PROGRAF) 0.5 MG Cap Take 2 capsules (1 mg total) by mouth every 12 (twelve) hours.  Qty: 360 capsule, Refills: 11    Comments: Txp Date:8/22/2017 (Lung) Disch Date:9/1/2017 ICD10:Z94.2 Txp Location:[facility]  Associated Diagnoses: Lung replaced by transplant         CONTINUE these medications which have NOT CHANGED    Details   amLODIPine (NORVASC) 10 MG tablet TAKE 1 TABLET BY MOUTH EVERY DAY  Qty: 90 tablet, Refills: 3      carvediloL (COREG) 3.125 MG tablet One po BID, hold if SBP < 130  Qty: 60 tablet, Refills: 11    Comments: .      cloNIDine (CATAPRES) 0.1 MG tablet Take 1 tablet (0.1 mg total) by mouth 3 (three) times daily.  Qty: 90 tablet, Refills: 11    Comments: .      everolimus, immunosuppressive, (ZORTRESS) 0.75 mg tablet Take 1 tablet (0.75 mg total) by mouth every 12 (twelve) hours.  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 insulin aspart U-100 (NOVOLOG FLEXPEN U-100 INSULIN) 100 unit/mL (3 mL) InPn pen Inject 10 Units into the skin 3 (three) times daily with meals.  Qty: 15 mL, Refills: 11    Comments: Plus SSI:  150/25.  Associated Diagnoses: Type 2 diabetes mellitus with hyperglycemia, with long-term current use of insulin      !! insulin aspart U-100 (NOVOLOG) 100 unit/mL (3 mL) InPn pen Inject 0-5 Units into the skin before meals and at bedtime as needed (Hyperglycemia). Use in addition to 10 units TID with meals  Refills: 0      magnesium chloride (MAG 64) 64 mg TbEC Take 2 tablets (128 mg total) by mouth 2 (two)  times daily.  Qty: 120 tablet, Refills: 11    Associated Diagnoses: Hypomagnesemia      pantoprazole (PROTONIX) 40 MG tablet Take 1 tablet (40 mg total) by mouth once daily.  Qty: 30 tablet, Refills: 11      predniSONE (DELTASONE) 5 MG tablet Take 1 tablet (5 mg total) by mouth once daily.  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  Qty: 30 tablet, Refills: 3      sevelamer carbonate (RENVELA) 800 mg Tab TAKE 2 TABLETS BY MOUTH 3 TIMES A DAY WITH MEALS.  Qty: 180 tablet, Refills: 1      sulfamethoxazole-trimethoprim 400-80mg (BACTRIM,SEPTRA) 400-80 mg per tablet Take 1 tablet by mouth every Mon, Wed, Fri.  Qty: 15 tablet, Refills: 11    Associated Diagnoses: Need for prophylactic antibiotic; Lung replaced by transplant      valsartan (DIOVAN) 160 MG tablet Take 1 tablet (160 mg total) by mouth 2 (two) times daily.  Qty: 180 tablet, Refills: 3    Comments: increasing dose      cholecalciferol, vitamin D3, (VITAMIN D3) 25 mcg (1,000 unit) capsule Take 2 capsules (2,000 Units total) by mouth once daily.  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 Administered by dialysis unit on T/Th/Sat.      folic acid (FOLVITE) 1 MG tablet TAKE 1 TABLET DAILY  Qty: 90 tablet, Refills: 4    Associated Diagnoses: Lung replaced by transplant      lancets Misc To check BG 4 times daily, to use with insurance preferred meter  Qty: 350 each, Refills: 3    Associated Diagnoses: Type 2 diabetes mellitus with hyperglycemia, with long-term current use of insulin      ondansetron  "(ZOFRAN-ODT) 8 MG TbDL Dissolve 1 tablet (8 mg total) by mouth daily as needed (pre-med for IVIG infusions).  Qty: 15 tablet, Refills: 3    Associated Diagnoses: Antibody mediated rejection of lung transplant; Preventive medication therapy needed      ONETOUCH VERIO Strp USE TO CHECK BLOOD GLUCOSE FOUR TIMES A DAY, TO USE WITH INSURANCE PREFERRED METER  Qty: 350 each, Refills: 3    Associated Diagnoses: Type 2 diabetes mellitus with hyperglycemia, with long-term current use of insulin      pen needle, diabetic (BD ULTRA-FINE JIM PEN NEEDLE) 32 gauge x 5/32" Ndle Uses 4 times daily, on multiple daily insulin injections  Qty: 350 each, Refills: 3    Associated Diagnoses: Type 2 diabetes mellitus with hyperglycemia, with long-term current use of insulin      sodium bicarbonate 650 MG tablet Two in am and two in pm  Qty: 120 tablet, Refills: 11       !! - Potential duplicate medications found. Please discuss with provider.      STOP taking these medications       ACCU-CHEK DEANNE PLUS METER Misc Comments:   Reason for Stopping:                 I have seen and examined this patient within the last 30 days. My clinical findings that support the need for the home health skilled services and home bound status are the following:no   Weakness/numbness causing balance and gait disturbance due to Infection and Weakness/Debility making it taxing to leave home.  Medical restrictions requiring assistance of another human to leave home due to  Home oxygen requirement.     Diet:   diabetic diet 2000 calorie and renal diet    Labs:  SN to perform labs:  per Lung Transplant [facility]    Referrals/ Consults  Physical Therapy to evaluate and treat. Evaluate for home safety and equipment needs; Establish/upgrade home exercise program. Perform / instruct on therapeutic exercises, gait training, transfer training, and Range of Motion.  Occupational Therapy to evaluate and treat. Evaluate home environment for safety and equipment needs. " Perform/Instruct on transfers, ADL training, ROM, and therapeutic exercises.    Activities:   activity as tolerated    Nursing:   Agency to admit patient within 24 hours of hospital discharge unless specified on physician order or at patient request    SN to complete comprehensive assessment including routine vital signs. Instruct on disease process and s/s of complications to report to MD. Review/verify medication list sent home with the patient at time of discharge  and instruct patient/caregiver as needed. Frequency may be adjusted depending on start of care date.     Skilled nurse to perform up to 3 visits PRN for symptoms related to diagnosis    When multiple disciplines ordered:    Start of Care occurs on Sunday - Wednesday schedule remaining discipline evaluations as ordered on separate consecutive days following the start of care.    Thursday SOC -schedule subsequent evaluations Friday and Monday the following week.     Friday - Saturday SOC - schedule subsequent discipline evaluations on consecutive days starting Monday of the following week.    Notify MD if SBP > 160 or < 90; DBP > 90 or < 50; HR > 120 or < 50; Temp > 101; O2 < 88%    Ok to schedule additional visits based on staff availability and patient request on consecutive days within the home health episode.    For all post-discharge communication and subsequent orders please contact patient's primary care physician. If unable to reach primary care physician or do not receive response within 30 minutes, please contact [facility] On Call RN for clinical staff order clarification    Miscellaneous   Home Oxygen:  No change and Oxygen at 2 L/min nasal canula to be used:  As needed for SOB and with activity.    Home Health Aide:  Nursing Weekly, Physical Therapy Twice weekly and Occupational Therapy Twice weekly    Wound Care Orders  no    I certify that this patient is confined to his home and needs intermittent skilled nursing care, physical therapy and  occupational therapy.      Kaelyn Edmondson MD

## 2022-02-17 NOTE — PLAN OF CARE
Patient's chart reviewed with lung transplant team. Appropriate for discharge after completion of remdesivir today. Please resume home dose prednisone 5 mg daily. Resume tacrolimus at 1 mg BID upon discharge. If unable to repeat labs this Monday, will follow up at clinic visit 3/1. Continue home OIP and immunosuppression at discharge.     Teresa Trejo PA-C  Lung Transplant

## 2022-02-17 NOTE — NURSING
patient's BP has been soft all day today, received HD today, current /58  scheduled to get carvedilol, clonidine, and valsartan tonight. Notified ROB Wu with virt team 10. Told to hold valsartan tonight. repeat EKG to be ordered to assess for appropriateness for carvedilol, and to recheck BP around 2100 to assess if BP is appropriate for clonidine.

## 2022-02-17 NOTE — ASSESSMENT & PLAN NOTE
-Last A1c reviewed-   Lab Results   Component Value Date    HGBA1C 8.1 (H) 12/13/2021     Home Antihyperglycemic Regiment:  -LDSSI, Aspart 10 u TID and deglu 20 u QHS    Inpatient Antihyperglycemic Regiment:  Antihyperglycemics (From admission, onward)            Start     Stop Route Frequency Ordered    02/14/22 1645  insulin aspart U-100 pen 10 Units         -- SubQ 3 times daily with meals 02/14/22 1208    02/14/22 1308  insulin aspart U-100 pen 1-10 Units         -- SubQ Before meals & nightly PRN 02/14/22 1208    02/14/22 1245  insulin detemir U-100 pen 26 Units         -- SubQ 2 times daily 02/14/22 1208        - Detemir 26 units BID   - SSI with POCT accuchecks ACHS and Diabetic diet 2000 byron  - Diabetic nutritional counseling given   - Goal 140-180 while IP  - will continue to monitor and adjust regimen as necessary

## 2022-02-17 NOTE — ASSESSMENT & PLAN NOTE
Assessed by PT/OT and home health recommended  -patient's daughter will come to stay with him when he is discharged - requests notification as soon as possible

## 2022-02-17 NOTE — PROGRESS NOTES
Arrived at pt bedside to begin HD treatment. Pt has questions regarding treatment today and outpatient scheduled treatment. Contacted MICHEL Xavier NP ok to dialysis pt on today. Pt spoke with case management and agreed to begin treatment on today. HD started at bedside to left arm fistula. Pt has large bruise and reports some tenderness to forearm area above fistula site. Pt states bruising occurred on last week during a dialysis treatment. Positive thrill and bruit noted and this time to access site, no drainage or induration to access site. Cannulation completed without difficulty. Pt VSS, awake, alert oriented, NAD.

## 2022-02-17 NOTE — PT/OT/SLP PROGRESS
"Physical Therapy Treatment    Patient Name:  Martin Hendrix Jr.   MRN:  [id number]    Recommendations:     Discharge Recommendations:  home health PT,home health OT   Discharge Equipment Recommendations: shower chair   Barriers to discharge: None    Assessment:     Martin Hendrix Jr. is a 61 y.o. male admitted with a medical diagnosis of COVID-19.  He presents with the following impairments/functional limitations:  weakness,impaired endurance,impaired self care skills,impaired functional mobilty,gait instability,impaired balance,impaired cardiopulmonary response to activity. Pt completing functional mobility without physical assist or use of DME. Ambulated household distance without major deviations or LOB. However, pt demo'ing impaired endurance with increasing fatigue and decreased spO2 following gait trial, limiting further progression of mobility. Pt would continue to benefit from skilled acute PT in order to address current deficits and progress functional mobility.     Rehab Prognosis: Good; patient would benefit from acute skilled PT services to address these deficits and reach maximum level of function.    Recent Surgery: * No surgery found *      Plan:     During this hospitalization, patient to be seen 3 x/week to address the identified rehab impairments via gait training,therapeutic activities,neuromuscular re-education and progress toward the following goals:    · Plan of Care Expires:  02/28/22    Subjective     Chief Complaint: fatigue  Patient/Family Comments/goals: "I haven't slept much since I've been here." "I'm supposed to be going home today."  Pain/Comfort:  · Pain Rating 1: 0/10      Objective:     Communicated with RN prior to session.  Patient found up in chair with oxygen upon PT entry to room.     General Precautions: Standard, fall,droplet,contact,airborne   Orthopedic Precautions:N/A   Braces: N/A  Respiratory Status: Nasal cannula, flow 2 L/min    SpO2 decreased to 85-86% per portable pulse " ox. Returned to sit for recovery. Cues provided for pursed lip breathing. SpO2 steadily increased to 90s.      Functional Mobility:  · Transfers:     · Sit to Stand:  stand by assistance with no AD, x2 reps from bedside chair  · Required return to sit following initial standing trial due to increased weakness/fatigue  · Gait: ~35 ft. within room with SBA without AD  · demo'd impaired weight-shifting ability and mild instability noted; no overt LOB  · Cues provided for self-pacing and safety awareness  · Distance limited 2* impaired endurance, increased fatigue, and weakness. SpO2 decreased to 85-86% per portable pulse ox, requiring return to sit       AM-PAC 6 CLICK MOBILITY  Turning over in bed (including adjusting bedclothes, sheets and blankets)?: 4  Sitting down on and standing up from a chair with arms (e.g., wheelchair, bedside commode, etc.): 3  Moving from lying on back to sitting on the side of the bed?: 3  Moving to and from a bed to a chair (including a wheelchair)?: 3  Need to walk in hospital room?: 3  Climbing 3-5 steps with a railing?: 3  Basic Mobility Total Score: 19       Therapeutic Activities and Exercises:  Pt educated on role of PT and PT POC. Pt verbalized understanding.   Pt educated on the effects of bed rest and the importance of OOB activity. Pt encouraged to sit UIC majority of day as tolerated and continue daily ambulation with nursing assist. Pt verbalized understanding.  Pt oriented to call bell and instructed to call for staff assist with standing tasks/transfers. Pt verbalized understanding.   Pt instructed on seated therex BLE to be completed (I) daily: AP, LAQ, hip flex, GS. Pt verbalized and demonstrated understanding.  Pt educated on energy conservation technique including, symptom assessment, slowing bernadette, and taking rest breaks as needed. Pt v/u.     Patient left up in chair with all lines intact, call button in reach and RN notified..    GOALS:   Multidisciplinary Problems      Physical Therapy Goals        Problem: Physical Therapy Goal    Goal Priority Disciplines Outcome Goal Variances Interventions   Physical Therapy Goal     PT, PT/OT Ongoing, Progressing     Description: Goals to be met by: 22     Patient will increase functional independence with mobility by performin. Supine to sit with Modified [place]  2. Sit to supine with Modified [place]  3. Sit to stand transfer with Modified [place]  4. Gait  x 100 feet with Modified [place] using LRAD, if needed.   5. Stand for 5 minutes with Modified [place]  6. Lower extremity exercise program x10 reps per handout, with independence                     Time Tracking:     PT Received On: 22  PT Start Time: 1344     PT Stop Time: 1400  PT Total Time (min): 16 min     Billable Minutes: Therapeutic Exercise 16    Treatment Type: Treatment  PT/PTA: PT     PTA Visit Number: 0     2022

## 2022-02-17 NOTE — PROGRESS NOTES
Home Oxygen Evaluation    Date Performed: 2022    1) Patient's Home O2 Sat on room air, while at rest: 96%        If O2 sats on room air at rest are 88% or below, patient qualifies. No additional testing needed. Document N/A in steps 2 and 3. If 89% or above, complete steps 2.      2) Patient's O2 Sat on room air while exercisin%        If O2 sats on room air while exercising remain 89% or above patient does not qualify, no further testing needed Document N/A in step 3. If O2 sats on room air while exercising are 88% or below, continue to step 3.      3) Patient's O2 Sat while exercising on O2: 92% at 2 LPM         (Must show improvement from #2 for patients to qualify)    If O2 sats improve on oxygen, patient qualifies for portable oxygen. If not, the patient does not qualify.

## 2022-02-17 NOTE — DISCHARGE SUMMARY
[facility] - [facility] ([address] Medicine  Telemedicine Discharge Summary      Patient Name: Martin Hendrix Jr.  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  Patient reported oxygen saturation of 88% on room air, he is on home oxygen as needed during exertion however was using it more often and at rest.  Reports requiring higher level of oxygen up to 5 L even at rest, which is unusual for him.  Today, the patient reports improvements in symptoms however he continues to be short of breath and quickly becomes tachypneic with worsening oxygen sat on ambulation.    At the outside hospital emergency room patient was COVID positive, chest x-ray with mid and lower lung interstitial disease (improved from previous).  Patient was requiring 4 L of oxygen satting 95%, was started on COVID protocol with Rocephin, azithromycin, dexamethasone and weight based Lovenox.  Due to patient's history of  lung transplant decision was made to transfer to [facility] for transplant pulmonology evaluation. He was admitted to [facility] while awaiting a bed at [facility].  His labs were as follow:  COVID positive (February 11), INR 1.13, D-dimer 4226, BNP 97.1, troponin less than 0.03, CPK 93, sodium 130, potassium 5.4 (hemolyzed)-repeat potassium 4.8, chloride 91, CO2 18, , creatinine 19.07, glucose 125, AST 38, ALT 19, white blood cells 9.5, hemoglobin 11.3, hematocrit 33.7, platelets 284, lactic acid 0.9  ABG with pH 7.33/pCO2 34/PO2 80 (2 L nasal cannula) VS: Temperature 98.3°, pulse 120, blood pressure 160/90, oxygen saturation 99%.     On presentation to [facility] patient was on 2 L satting 95%, hemodynamically stable.  Admitted to Hospital Medicine for further care and management.        * No surgery found *      Hospital Course:   Patient admitted to Hosp Med and started on dexamethasone and remdesivir in setting of COVID. ID, Lung Transplant both consulted. Patient having hyperglycemia likely due to dexamethasone in setting of T2DM, insulin drip started, work up for DKA pending. Patient sating 94% on RA since admission. Insulin gtt stopped and transitioned to basal+prandial SQ insulin. Holding tacro for high serum level. Pulmonary transplant evaluated him and observed stability. Planning for discharge.       Goals of Care Treatment Preferences:  Code Status: Full Code      Consults:   Consults (From admission, onward)        Status Ordering Provider     Inpatient virtual consult to Hospital Medicine  Once        Provider:  (Not yet assigned)    Completed KAYLA MOREAU     Inpatient consult to Midline team  Once        Provider:  (Not yet assigned)    Completed KAYLA MOREAU     Inpatient consult to Infectious Diseases  Once        Provider:  (Not yet assigned)    Completed NITESH BRANDON     Inpatient consult to Nephrology  Once        Provider:  (Not yet assigned)    Completed NITESH BRANDON     Inpatient consult to Lung Transplant  Once        Provider:  (Not yet assigned)    Completed NITESH BRANDON        * COVID-19  Patient is identified as High risk for severe complications of COVID 19 based on COVID risk score of 7   Initiate standard COVID protocols; COVID-19 testing Collection Date: 2/11/2022 Collection Time:   2:05 PM ,Infection Control notification  and isolation- respiratory, contact and droplet per protocol  Patient states he tested positive at HD unit on 2/8.      Diagnostics: (leukopenia,  hyponatremia, hyperferritinemia, elevated troponin, elevated d-dimer, age, and comorbidities are significant predictors of poor clinical outcome)  CBC, CMP, Procalcitonin, Ferritin, CRP, LDH, BNP, ECG, Blood Culture x2, Portable CXR, UA and culture, PTT and INR     Management: Initiate targeted therapy with Remdesivir, 200mg IV x1, followed by 100mg IV daily x5 days total and Dexamethasone PO/IV 6mg daily x10 days, Maintain oxygen saturations 92-96% via Nasal Cannula  LPM and monitor with continuous/intermittent pulse oximetry. , Inhaled bronchodilators as needed for shortness of breath., Continuous cardiac monitoring. and Manage respiratory failure (O2 requirement >10LPM or needing NIPPV/Mechanical ventilation) and/or Pneumonia (active chest infiltrates) separately as described below.  - satting well on Room air  - heparin drip continued, was started at outside hospital (was initially on Lovenox)   -ID consulted;  initially started on Zithromax and Rocephin then stopped per ID recs; monitor respiratory culture      Advance Care Planning  Current advance care plan has been discussed with patient/family/POA and patient currently wishes Full Code.     Post viral debility  Assessed by PT/OT and home health recommended  -patient's daughter will come to stay with him when he is discharged - requests notification as soon as possible     ESRD (end stage renal disease)  Patient is on dialysis Tuesday Thursday and Saturday, however over the past week outside hospital he has been receiving dialysis Monday Wednesday and Friday, with most recent being Friday 11th on COVID isolation unit per patient  - consult Nephrology, inpatient HD  - phosphate binder t.i.d. and renal diet  - avoid nephrotoxic agents  - monitor CMP  - establish COVID dialysis chair     Hypertension associated with diabetes  Echo 2/14:   · Technically very difficult study  · The left ventricle is normal in size with concentric remodeling and low normal  systolic function. The estimated ejection fraction is 50%.  · Normal left ventricular diastolic function.  · The right heart and IVC were not well visualized.  -Continue home clonidine 0.1mg TID, valsartan 160mg BID, amlodipine 10mg qd and Coreg 3.125mg BID  -continue to monitor and adjust regimen as necessary     Mixed hyperlipidemia  home statin rosuvastatin 10mg  -Lipitor 40mg qd while inpatient     CMV (cytomegalovirus infection) status positive  Cytomegalovirus DNA Detec...   Cytomegalovirus Log (copies/mL) <1.70   Cytomegalovirus PCR, Quant <50      - Quant is low, so will not treat  -Transplant Pulm following  - Hold MMF     Type 2 diabetes mellitus with hyperglycemia, with long-term current use of insulin  - Detemir 26 units BID   - SSI with POCT BG ACHS and Diabetic diet 2000 byron  - Diabetic nutritional counseling given   - Goal 140-180 while IP  - continue to monitor and adjust regimen as necessary     Prophylactic antibiotic  Continue Bactrim and Zithromax prophylaxis every M,W,F     Lung replaced by transplant  Patient status post lung transplant x2  Transfer to [facility] for lung transplant services  Patient is on home Prograf and everolimus  - HELD home Prograf and continued everolimus  - continue weaning oxygen as tolerated - he has home oxygen for prn use  - transplant Pulm consulted  - ID consulted     Immunosuppression  - Hold Tacrolimus and will followup outpatient  - Hold MMF due to resistant CMV  - Cont everolimus  - transplant team monitoring tacrolimus level and will advise when to resume;        Final Active Diagnoses:    Diagnosis Date Noted POA    PRINCIPAL PROBLEM:  COVID-19 [U07.1] 02/13/2022 Yes    Post viral debility [R53.81, B94.8] 02/16/2022 Yes    ESRD (end stage renal disease) [N18.6] 09/14/2021 Yes    Hypertension associated with diabetes [E11.59, I15.2] 06/01/2020 Yes     Chronic    Mixed hyperlipidemia [E78.2] 10/04/2018 Yes    CMV (cytomegalovirus infection) status positive  [B25.9] 09/17/2018 Yes    Type 2 diabetes mellitus with hyperglycemia, with long-term current use of insulin [E11.65, Z79.4] 11/06/2017 Not Applicable     Chronic    Lung replaced by transplant [Z94.2] 08/22/2017 Not Applicable    Immunosuppression [D84.9] 08/22/2017 Yes    Prophylactic antibiotic [Z79.2] 08/22/2017 Not Applicable      Problems Resolved During this Admission:       Discharged Condition: stable    Disposition: Home-Health Care [facility]    Follow Up:   Follow-up Information     RUBA TRACE On 2/18/2022.    Why: at 1:30 PM for HD treatment  Contact information:  [address] [place] [id number] [phone]           Serg Lerma NP On 2/24/2022.    Specialty: Family Medicine  Why: at 9:40 AM.  Contact information:  [address] [place] [address]  [phone]             Call [facility] - Transplant [facility].    Specialty: Transplant  Why: Repeat labwork, As previously planned  Contact information:  [address]  [facility] [id number] [phone]  Additional information:  [facility] - Main Building, 1st floor near Clinic elevator   Please park in [facility] and walk through Clinic elevator hallway           Schedule an appointment as soon as possible for a visit with [facility] DERMATOLOGY.    Specialty: Dermatology  Why: To establish care  Contact information:  [address]  [facility] [address]  [phone]                     Future Appointments   Date Time Provider Department Center   2/21/2022  7:50 AM LAB, APPOINTMENT [place] [facility]   2/21/2022  8:30 AM DERMATOLOGY RESIDENT CLINIC [facility] DERM [facility]   2/24/2022  9:40 AM Serg Lerma NP [facility]   3/8/2022  7:45 AM [facility] OIC-XRAY [facility]   3/8/2022  8:20 AM LAB, TRANSPLANT [facility] LABTX [facility]   3/8/2022  8:40 AM SPECIMEN LAB, TRANSPLANT [facility]TX [facility]   3/8/2022  9:30  AM PULMONARY FUNCTION [facility] PULMLAB [facility]   3/8/2022  9:45 AM PULMONARY FUNCTION [facility] PULMLAB [facility]   3/8/2022 10:00 AM Robina Mcdonald MD [facility] LUNGTX [facility]   4/25/2022  8:30 AM VASCULAR, LAB [facility] VASCLAB [facility]   4/25/2022  9:00 AM CARLI Thomason II, MD [facility] VASCSUR [facility]   9/14/2022 10:20 AM Michel Henley MD [facility]       Patient Instructions:      Ambulatory referral/consult to Dermatology   Standing Status: Future   Referral Priority: Routine Referral Type: Consultation   Referral Reason: Specialty Services Required   Requested Specialty: Dermatology   Number of Visits Requested: 1     Diet diabetic     Diet renal     COVID-19 Surveillance Program     Order Specific Question Answer Comments   Does patient have a smartphone? Yes    Does patient have the [facility] bg on their smartphone? No    While in surveillance program, will patient be using home oxygen? Yes      Notify your health care provider if you experience any of the following:  temperature >100.4     Notify your health care provider if you experience any of the following:  persistent nausea and vomiting or diarrhea     Notify your health care provider if you experience any of the following:  difficulty breathing or increased cough     Notify your health care provider if you experience any of the following:  severe persistent headache     Notify your health care provider if you experience any of the following:  persistent dizziness, light-headedness, or visual disturbances     Notify your health care provider if you experience any of the following:  increased confusion or weakness     Notify your health care provider if you experience any of the following:  worsening rash     Activity as tolerated       Significant Diagnostic Studies:     Recent Labs   Lab 09/20/21  0901 12/13/21  0658   HGBA1C 9.5* 8.1*     Recent Labs   Lab 02/14/22  0356 02/15/22  0856 02/16/22  0325   WBC 12.59 11.16 10.55   HGB 10.6* 9.6* 10.3*    HCT 33.2* 29.1* 32.5*    375 361     Recent Labs   Lab 02/14/22  0356 02/14/22  0356 02/15/22  0856 02/16/22  0325   GRAN 78.7*  9.9*   < > 82.5*  9.2* 83.2*  8.8*   LYMPH 11.8*  1.5   < > 9.6*  1.1 8.6*  0.9*   MONO 8.1  1.0   < > 6.5  0.7 6.2  0.7   EOS 0.0  --  0.0 0.0    < > = values in this interval not displayed.     Recent Labs   Lab 02/13/22  0243 02/13/22  1219 02/14/22  0356 02/14/22  0356 02/15/22  0856 02/16/22  0325 02/17/22  0338   *   < > 136   < > 131* 131* 130*   K 4.4   < > 4.5   < > 4.5 5.0 4.4   CL 95   < > 94*   < > 91* 92* 88*   CO2 21*   < > 20*   < > 19* 16* 25   BUN 72*   < > 106*   < > 126* 149* 93*   CREATININE 10.6*   < > 12.5*   < > 14.5* 16.1* 11.1*   *   < > 109   < > 153* 97 233*   CALCIUM 9.2   < > 9.3   < > 8.6* 9.1 8.7   ALBUMIN 2.8*  --  3.0*   < > 2.6* 2.8* 2.7*   MG 2.1  --  2.4  --  2.2  --   --    PHOS 7.9*  --  9.4*  --  10.7*  --   --     < > = values in this interval not displayed.     Recent Labs   Lab 02/13/22  0115 02/13/22  0243 02/13/22  0626 02/14/22  0356 02/15/22  0856 02/16/22  0325 02/17/22  0338   ALKPHOS  --    < >  --    < > 59 57 66   ALT  --    < >  --    < > 10 13 16   AST  --    < >  --    < > 14 16 21   PROT  --    < >  --    < > 5.8* 6.1 5.9*   BILITOT  --    < >  --    < > 0.4 0.4 0.5   INR 1.0  --  1.0  --   --   --   --     < > = values in this interval not displayed.     Procalcitonin (ng/mL)   Date Value   02/13/2022 1.38 (H)   03/03/2019 11.39 (H)     Lactate (Lactic Acid) (mmol/L)   Date Value   02/13/2022 1.0   03/04/2019 0.9   03/03/2019 2.2   03/03/2019 1.6     BNP (pg/mL)   Date Value   02/13/2022 300 (H)   05/03/2021 391 (H)   01/12/2021 158 (H)   08/10/2017 12   04/24/2017 83     Sed Rate (mm/Hr)   Date Value   02/13/2022 93 (H)     D-Dimer (mg/L FEU)   Date Value   02/17/2022 2.25 (H)   02/15/2022 1.69 (H)   02/13/2022 2.22 (H)     Ferritin (ng/mL)   Date Value   02/17/2022 3,113 (H)   02/15/2022 3,625 (H)    02/13/2022 5,228 (H)   05/01/2021 136   02/08/2021 170   01/21/2021 135     LD (U/L)   Date Value   02/17/2022 302 (H)   02/15/2022 271 (H)   02/13/2022 293 (H)     Troponin I (ng/mL)   Date Value   02/13/2022 0.026     CPK (U/L)   Date Value   02/13/2022 47     SARS-CoV2 (COVID-19) Qualitative PCR (no units)   Date Value   02/11/2022 POSITIVE (A)   01/09/2022 Not Detected   01/04/2022 Not Detected   10/10/2021 Not Detected   09/17/2021 Not Detected   08/14/2021 Not Detected   07/31/2021 Not Detected   04/05/2021 Not Detected   01/08/2021 Not Detected   10/04/2020 Not Detected   06/29/2020 Not Detected     SARS-CoV-2 RNA, Amplification, Qual (no units)   Date Value   10/06/2021 Negative   05/07/2021 Negative   04/26/2021 Negative   02/08/2021 Negative   01/20/2021 Negative   01/11/2021 Negative       Echo  · Technically very difficult study  · The left ventricle is normal in size with concentric remodeling and low   normal systolic function. The estimated ejection fraction is 50%.  · Normal left ventricular diastolic function.  · The right heart and IVC were not well visualized.          Medications:  Reconciled Home Medications:      Medication List      START taking these medications    pulse oximeter device  Commonly known as: pulse oximeter  by Apply Externally route 2 (two) times a day. Use twice daily at 8 AM and 3 PM and record the value in [employer] as directed.        CHANGE how you take these medications    azithromycin 250 MG tablet  Commonly known as: Z-REYNA  Take 2 tablets (500 mg total) by mouth every Mon, Wed, Fri.  What changed: how much to take     tacrolimus 0.5 MG Cap  Commonly known as: PROGRAF  Take 2 capsules (1 mg total) by mouth every 12 (twelve) hours.  What changed: how much to take     TRESIBA FLEXTOUCH U-200 200 unit/mL (3 mL) insulin pen  Generic drug: insulin degludec  Inject 30 Units into the skin every evening.  What changed: how much to take        CONTINUE taking these medications     amLODIPine 10 MG tablet  Commonly known as: NORVASC  TAKE 1 TABLET BY MOUTH EVERY DAY     carvediloL 3.125 MG tablet  Commonly known as: COREG  One po BID, hold if SBP < 130     cholecalciferol (vitamin D3) 25 mcg (1,000 unit) capsule  Commonly known as: VITAMIN D3  Take 2 capsules (2,000 Units total) by mouth once daily.     cinacalcet 30 MG Tab  Commonly known as: SENSIPAR  One po QDAY, with largest meal     cloNIDine 0.1 MG tablet  Commonly known as: CATAPRES  Take 1 tablet (0.1 mg total) by mouth 3 (three) times daily.     ECOTRIN LOW STRENGTH 81 MG EC tablet  Generic drug: aspirin  TAKE 1 TABLET DAILY     epoetin greer-epbx 4,000 unit/mL injection  Commonly known as: RETACRIT  Inject 1.41 mLs (5,640 Units total) into the skin every Tues, Thurs, Sat. Administered by dialysis unit on T/Th/Sat.     everolimus (immunosuppressive) 0.75 mg tablet  Commonly known as: ZORTRESS  Take 1 tablet (0.75 mg total) by mouth every 12 (twelve) hours.     folic acid 1 MG tablet  Commonly known as: FOLVITE  TAKE 1 TABLET DAILY     furosemide 40 MG tablet  Commonly known as: LASIX  TAKE 1 TABLET BY MOUTH EVERY DAY AS NEEDED FOR LEG SWELLING     * insulin aspart U-100 100 unit/mL (3 mL) Inpn pen  Commonly known as: NovoLOG  Inject 0-5 Units into the skin before meals and at bedtime as needed (Hyperglycemia). Use in addition to 10 units TID with meals     * insulin aspart U-100 100 unit/mL (3 mL) Inpn pen  Commonly known as: NovoLOG Flexpen U-100 Insulin  Inject 10 Units into the skin 3 (three) times daily with meals.     lancets Misc  To check BG 4 times daily, to use with insurance preferred meter     MAG 64 64 mg Tbec  Generic drug: magnesium chloride  Take 2 tablets (128 mg total) by mouth 2 (two) times daily.     ondansetron 8 MG Tbdl  Commonly known as: ZOFRAN-ODT  Dissolve 1 tablet (8 mg total) by mouth daily as needed (pre-med for IVIG infusions).     ONETOUCH VERIO TEST STRIPS Strp  Generic drug: blood sugar diagnostic  USE  "TO CHECK BLOOD GLUCOSE FOUR TIMES A DAY, TO USE WITH INSURANCE PREFERRED METER     pantoprazole 40 MG tablet  Commonly known as: PROTONIX  Take 1 tablet (40 mg total) by mouth once daily.     pen needle, diabetic 32 gauge x 5/32" Ndle  Commonly known as: BD ULTRA-FINE JIM PEN NEEDLE  Uses 4 times daily, on multiple daily insulin injections     predniSONE 5 MG tablet  Commonly known as: DELTASONE  Take 1 tablet (5 mg total) by mouth once daily.     rosuvastatin 10 MG tablet  Commonly known as: CRESTOR  Take 1 tablet (10 mg total) by mouth once daily.     sevelamer carbonate 800 mg Tab  Commonly known as: RENVELA  TAKE 2 TABLETS BY MOUTH 3 TIMES A DAY WITH MEALS.     sodium bicarbonate 650 MG tablet  Two in am and two in pm     sulfamethoxazole-trimethoprim 400-80mg 400-80 mg per tablet  Commonly known as: BACTRIM,SEPTRA  Take 1 tablet by mouth every Mon, Wed, Fri.     valsartan 160 MG tablet  Commonly known as: DIOVAN  Take 1 tablet (160 mg total) by mouth 2 (two) times daily.         * This list has 2 medication(s) that are the same as other medications prescribed for you. Read the directions carefully, and ask your doctor or other care provider to review them with you.            STOP taking these medications    ACCU-CHEK DEANNE PLUS METER Misc  Generic drug: blood-glucose meter            Indwelling Lines/Drains at time of discharge:   Lines/Drains/Airways     Drain                 Hemodialysis AV Fistula Left forearm -- days                Time spent on the discharge of patient: 50 minutes   This service was provided by Virtual Visit.   Patient was seen and examined on the date of discharge.  Additional time was spent speaking with consultants and case management, reviewing records, and/or discussing the plan of care with patient/family.  The patient location is: [id number]  Admitted 2/12/2022 11:26 PM  Present with the patient at the time of the telemed/virtual assessment: n/a    Patient was transferred to " [facility] on:  2/16/2022   This document was prepared by chart review and may not directly reflect my personal knowledge of the patient's case, clinical course, or significant events during the hospital stay.    The attending portion of this evaluation, treatment, and documentation was performed per Kaelyn Edmondson MD via Telemedicine AudioVisual using the secure [employer] software platform with 2 way audio/video. The provider was located off-site and the patient is located in the hospital. The aforementioned video software was utilized to document the relevant history and physical exam. Secure [employer] software platform was used instead of [employer] for this visit.      Kaelyn Edmondson MD  Department of Hospital Medicine  [facility] - Intensive Care ([place])

## 2022-02-17 NOTE — ASSESSMENT & PLAN NOTE
Patient is identified as High risk for severe complications of COVID 19 based on COVID risk score of 7   Initiate standard COVID protocols; COVID-19 testing Collection Date: 2/11/2022 Collection Time:   2:05 PM ,Infection Control notification  and isolation- respiratory, contact and droplet per protocol  Patient states he tested positive at HD unit on 2/8.     Diagnostics: (leukopenia, hyponatremia, hyperferritinemia, elevated troponin, elevated d-dimer, age, and comorbidities are significant predictors of poor clinical outcome)  CBC, CMP, Procalcitonin, Ferritin, CRP, LDH, BNP, ECG, Blood Culture x2, Portable CXR, UA and culture, PTT and INR    Management: Initiate targeted therapy with Remdesivir, 200mg IV x1, followed by 100mg IV daily x5 days total and Dexamethasone PO/IV 6mg daily x10 days, Maintain oxygen saturations 92-96% via Nasal Cannula  LPM and monitor with continuous/intermittent pulse oximetry. , Inhaled bronchodilators as needed for shortness of breath., Continuous cardiac monitoring. and Manage respiratory failure (O2 requirement >10LPM or needing NIPPV/Mechanical ventilation) and/or Pneumonia (active chest infiltrates) separately as described below.  - satting well on Room air  - heparin drip continued, was started at outside hospital (was initially on Lovenox)   -ID consulted;  initially started on zithromax and rocephin then stopped per ID recs; monitor respiratory culture       Advance Care Planning  Current advance care plan has been discussed with patient/family/POA and patient currently wishes Full Code.

## 2022-02-17 NOTE — ASSESSMENT & PLAN NOTE
Patient is on dialysis Tuesday Thursday and Saturday, however over the past week outside hospital he has been receiving dialysis Monday Wednesday and Friday, with most recent being Friday 11th on [facility] unit per patient    - consult nephrology, inpatient HD  - phosphate binder t.i.d. and renal diet  - avoid nephrotoxic agents  - monitor CMP  - Will need to find COVID dialysis chair

## 2022-02-17 NOTE — PROGRESS NOTES
[facility] - Intensive Care ([address] Medicine  Telemedicine Progress Note    Patient Name: Martin Hendrix Jr.  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  Patient reported oxygen saturation of 88% on room air, he is on home oxygen as needed during exertion however was using it more often and at rest.  Reports requiring higher level of oxygen up to 5 L even at rest, which is unusual for him.  Today, the patient reports improvements in symptoms however he continues to be short of breath and quickly becomes tachypneic with worsening oxygen sat on ambulation.    At the outside hospital emergency room patient was COVID positive, chest x-ray with mid and lower lung interstitial disease (improved from previous).  Patient was requiring 4 L of oxygen satting 95%, was started on COVID protocol with Rocephin, azithromycin, dexamethasone and weight based Lovenox.  Due to patient's history of  lung transplant decision was made to transfer to [facility] [place] pulmonology evaluation. He was admitted to [facility] while awaiting a bed at [facility].  His labs were as follow:  COVID positive (February 11), INR 1.13, D-dimer 4226, BNP 97.1, troponin less than 0.03, CPK 93, sodium 130, potassium 5.4 (hemolyzed)-repeat potassium 4.8, chloride 91, CO2 18, , creatinine 19.07, glucose 125, AST 38, ALT 19, white blood cells 9.5, hemoglobin 11.3, hematocrit 33.7, platelets 284, lactic acid 0.9 ABG with pH 7.33/pCO2 34/PO2 80 (2 L nasal  cannula) VS: Temperature 98.3°, pulse 120, blood pressure 160/90, oxygen saturation 99%.     On presentation [place] [facility] [place] was on 2 L satting 95%, hemodynamically stable.  Admitted to Hospital Medicine for further care and management.          Overview/Hospital Course:  Patient admitted to Hosp Med and started on dexamethasone and remdesivir in setting of COVID. ID, Lung Transplant both consulted. Patient having hyperglycemia likely due to dexamethasone in setting of T2DM, insulin drip started, work up for DKA pending. Patient sating 94% on RA since admission. Insulin gtt stopped and transitioned to basal+prandial SQ insulin. Holding tacro for high serum level. Pulmonary transplant evaluated him and observed stability. Planning for discharge.        This encounter was provided through telemedicine.  Patient was transferred to the telemedicine service on:  02/16/2022   The patient location is: [facility]/[address] A admitted 2/12/2022 11:26 PM.  Present with the patient at the time of the telemed/virtual assessment: Telepresenter    Interval History/Overnight Events:   Clinical record since admit reviewed.    Patient with intermittent cough which has been present with acute illness; his dyspnea seems near baseline but he is weak; he says he will be able to care for himself at home and lives alone; he has taken mucinex in the past with relief but is not familiar with tessalon perles; receiving HD at bedside currently; he is already assigned to a COVID HD unit near his home; hoarseness intermittent since transplant from vocal cord dysfunction; he is anxious to get home; Overall improved during stay but slow.    Updated patient's daughter (with his consent); she plans to come to stay with him once he is discharged and can be in town as early as tomorrow pm; she is concerned he has been depressed for some time; she requested update regarding plans for lung rejection and transplant team to speak with her    Review of Systems    Constitutional: Positive for activity change and fatigue. Negative for fever.   HENT: Positive for voice change. Negative for trouble swallowing.    Respiratory: Positive for cough and shortness of breath.    Cardiovascular: Negative for chest pain.   Gastrointestinal: Negative for diarrhea and vomiting.        Inpatient Medications:  Scheduled Meds:   sodium chloride 0.9%   Intravenous Once    albuterol  2 puff Inhalation Q6H    amLODIPine  10 mg Oral Daily    apixaban  5 mg Oral BID    ascorbic acid (vitamin C)  500 mg Oral BID    atorvastatin  40 mg Oral Daily    carvediloL  3.125 mg Oral BID    cloNIDine  0.1 mg Oral TID    dexAMETHasone  6 mg Oral Daily    everolimus (immunosuppressive)  0.75 mg Oral Q12H    folic acid  1,000 mcg Oral Daily    insulin aspart U-100  10 Units Subcutaneous TIDWM    insulin detemir U-100  26 Units Subcutaneous BID    multivitamin  1 tablet Oral Daily    mupirocin   Nasal BID    pantoprazole  40 mg Oral Daily    remdesivir infusion  100 mg Intravenous Daily    sevelamer carbonate  1,600 mg Oral TID WM    sodium bicarbonate  650 mg Oral BID    valsartan  160 mg Oral BID     Continuous Infusions:  PRN Meds:.dextrose 10%, dextrose 10%, glucagon (human recombinant), glucose, glucose, guaiFENesin, insulin aspart U-100, naloxone, sodium chloride 0.9%, sodium chloride 0.9%, sodium chloride 0.9%      Objective:     Temp:  [97.7 °F (36.5 °C)-98.3 °F (36.8 °C)] 98.2 °F (36.8 °C)  Pulse:  [78-95] 95  Resp:  [18] 18  SpO2:  [92 %-98 %] 97 %  BP: ()/(53-82) 107/59      Intake/Output Summary (Last 24 hours) at 2/16/2022 1652  Last data filed at 2/16/2022 1600  Gross per 24 hour   Intake 760 ml   Output --   Net 760 ml        Body mass index is 29.01 kg/m².    Physical Exam  Vitals and nursing note reviewed.   Constitutional:       General: He is not in acute distress.     Appearance: He is ill-appearing.   HENT:      Head: Normocephalic and atraumatic.      Right Ear:  Hearing normal.      Left Ear: Hearing normal.      Nose: Nose normal.      Mouth/Throat:      Comments: Hoarse voice.  Eyes:      General: No scleral icterus.        Right eye: No discharge.         Left eye: No discharge.      Extraocular Movements: Extraocular movements intact.   Cardiovascular:      Rate and Rhythm: Normal rate.   Pulmonary:      Effort: Pulmonary effort is normal. No accessory muscle usage or respiratory distress.   Skin:     Findings: No rash.   Neurological:      General: No focal deficit present.      Mental Status: He is alert and oriented to person, place, and time.      Cranial Nerves: No cranial nerve deficit.      Motor: No weakness.   Psychiatric:         Attention and Perception: Attention normal.         Mood and Affect: Mood normal.         Behavior: Behavior is cooperative.         Thought Content: Thought content normal.          Labs:  Recent Results (from the past 24 hour(s))   POCT glucose    Collection Time: 02/15/22 10:24 PM   Result Value Ref Range    POCT Glucose 111 (H) 70 - 110 mg/dL   Culture, Respiratory with Gram Stain    Collection Time: 02/15/22 10:45 PM    Specimen: Sputum, Expectorated; Respiratory   Result Value Ref Range    Gram Stain (Respiratory) Few Gram positive cocci     Gram Stain (Respiratory) Few Gram positive rods     Gram Stain (Respiratory) Few Gram negative rods     Gram Stain (Respiratory) Rare WBC's     Gram Stain (Respiratory) <10 epithelial cells per low power field.    Tacrolimus level    Collection Time: 02/16/22  3:25 AM   Result Value Ref Range    Tacrolimus Lvl 13.3 5.0 - 15.0 ng/mL   CBC with Automated Differential    Collection Time: 02/16/22  3:25 AM   Result Value Ref Range    WBC 10.55 3.90 - 12.70 K/uL    RBC 3.49 (L) 4.60 - 6.20 M/uL    Hemoglobin 10.3 (L) 14.0 - 18.0 g/dL    Hematocrit 32.5 (L) 40.0 - 54.0 %    MCV 93 82 - 98 fL    MCH 29.5 27.0 - 31.0 pg    MCHC 31.7 (L) 32.0 - 36.0 g/dL    RDW 13.2 11.5 - 14.5 %    Platelets 361 150  - 450 K/uL    MPV 9.7 9.2 - 12.9 fL    Immature Granulocytes 1.8 (H) 0.0 - 0.5 %    Gran # (ANC) 8.8 (H) 1.8 - 7.7 K/uL    Immature Grans (Abs) 0.19 (H) 0.00 - 0.04 K/uL    Lymph # 0.9 (L) 1.0 - 4.8 K/uL    Mono # 0.7 0.3 - 1.0 K/uL    Eos # 0.0 0.0 - 0.5 K/uL    Baso # 0.02 0.00 - 0.20 K/uL    nRBC 0 0 /100 WBC    Gran % 83.2 (H) 38.0 - 73.0 %    Lymph % 8.6 (L) 18.0 - 48.0 %    Mono % 6.2 4.0 - 15.0 %    Eosinophil % 0.0 0.0 - 8.0 %    Basophil % 0.2 0.0 - 1.9 %    Differential Method Automated    Comprehensive Metabolic Panel (CMP)    Collection Time: 02/16/22  3:25 AM   Result Value Ref Range    Sodium 131 (L) 136 - 145 mmol/L    Potassium 5.0 3.5 - 5.1 mmol/L    Chloride 92 (L) 95 - 110 mmol/L    CO2 16 (L) 23 - 29 mmol/L    Glucose 97 70 - 110 mg/dL     (H) 6 - 20 mg/dL    Creatinine 16.1 (H) 0.5 - 1.4 mg/dL    Calcium 9.1 8.7 - 10.5 mg/dL    Total Protein 6.1 6.0 - 8.4 g/dL    Albumin 2.8 (L) 3.5 - 5.2 g/dL    Total Bilirubin 0.4 0.1 - 1.0 mg/dL    Alkaline Phosphatase 57 55 - 135 U/L    AST 16 10 - 40 U/L    ALT 13 10 - 44 U/L    Anion Gap 23 (H) 8 - 16 mmol/L    eGFR if African American 3.3 (A) >60 mL/min/1.73 m^2    eGFR if non African American 2.9 (A) >60 mL/min/1.73 m^2   POCT glucose    Collection Time: 02/16/22  6:33 AM   Result Value Ref Range    POCT Glucose 132 (H) 70 - 110 mg/dL   POCT glucose    Collection Time: 02/16/22 11:34 AM   Result Value Ref Range    POCT Glucose 165 (H) 70 - 110 mg/dL        Lab Results   Component Value Date    [id number] POSITIVE (A) 02/11/2022       Recent Labs   Lab 02/14/22  0356 02/14/22  0356 02/15/22  0856 02/16/22  0325   WBC 12.59  --  11.16 10.55   LYMPH 11.8*  1.5   < > 9.6*  1.1 8.6*  0.9*   HGB 10.6*  --  9.6* 10.3*   HCT 33.2*  --  29.1* 32.5*     --  375 361    < > = values in this interval not displayed.     Recent Labs   Lab 02/13/22  0243 02/13/22  1219 02/14/22  0356 02/15/22  0856 02/16/22  0325   *   < > 136 131* 131*   K 4.4    < > 4.5 4.5 5.0   CL 95   < > 94* 91* 92*   CO2 21*   < > 20* 19* 16*   BUN 72*   < > 106* 126* 149*   CREATININE 10.6*   < > 12.5* 14.5* 16.1*   *   < > 109 153* 97   CALCIUM 9.2   < > 9.3 8.6* 9.1   MG 2.1  --  2.4 2.2  --    PHOS 7.9*  --  9.4* 10.7*  --     < > = values in this interval not displayed.     Recent Labs   Lab 02/13/22  0115 02/13/22  0243 02/13/22  0626 02/14/22  0356 02/15/22  0856 02/16/22  0325   ALKPHOS  --    < >  --  64 59 57   ALT  --    < >  --  15 10 13   AST  --    < >  --  18 14 16   ALBUMIN  --    < >  --  3.0* 2.6* 2.8*   PROT  --    < >  --  7.1 5.8* 6.1   BILITOT  --    < >  --  0.4 0.4 0.4   INR 1.0  --  1.0  --   --   --     < > = values in this interval not displayed.        Recent Labs     02/15/22  0053   DDIMER 1.69*   FERRITIN 3,625*   *       All labs within the last 24 hours were reviewed.     Microbiology:  Microbiology Results (last 7 days)     Procedure Component Value Units Date/Time    Culture, Respiratory with Gram Stain [[id number]] Collected: 02/15/22 2245    Order Status: Completed Specimen: Respiratory from Sputum, Expectorated Updated: 02/16/22 1019     Gram Stain (Respiratory) Few Gram positive cocci     Gram Stain (Respiratory) Few Gram positive rods     Gram Stain (Respiratory) Few Gram negative rods     Gram Stain (Respiratory) Rare WBC's     Gram Stain (Respiratory) <10 epithelial cells per low power field.    Blood culture (site 2) [[id number]] Collected: 02/13/22 0114    Order Status: Completed Specimen: Blood Updated: 02/16/22 0612     Blood Culture, Routine No Growth to date      No Growth to date      No Growth to date      No Growth to date    Narrative:      Site # 2, aerobic only    Blood culture (site 1) [[id number]] Collected: 02/13/22 0113    Order Status: Completed Specimen: Blood Updated: 02/16/22 0612     Blood Culture, Routine No Growth to date      No Growth to date      No Growth to date      No Growth to date    Narrative:       Site # 1, aerobic and anaerobic    Clostridium difficile EIA [[id number]]     Order Status: Canceled Specimen: Stool             Imaging      Results for orders placed during the hospital encounter of 02/12/22    Echo    Interpretation Summary  · Technically very difficult study  · The left ventricle is normal in size with concentric remodeling and low normal systolic function. The estimated ejection fraction is 50%.  · Normal left ventricular diastolic function.  · The right heart and IVC were not well visualized.      Echo  · Technically very difficult study  · The left ventricle is normal in size with concentric remodeling and low   normal systolic function. The estimated ejection fraction is 50%.  · Normal left ventricular diastolic function.  · The right heart and IVC were not well visualized.         All imaging within the last 24 hours was reviewed.       Discharge Planning   DERICK: 2/17/2022     Code Status: Full Code   Is the patient medically ready for discharge?: No    Reason for patient still in hospital (select all that apply): Patient trending condition, Laboratory test, Treatment and Consult recommendations  Discharge Plan A: Home Health - COVID isolation HD           Assessment/Plan:      * COVID-19  Patient is identified as High risk for severe complications of COVID 19 based on COVID risk score of 7   Initiate standard COVID protocols; COVID-19 testing Collection Date: 2/11/2022 Collection Time:   2:05 PM ,Infection Control notification  and isolation- respiratory, contact and droplet per protocol  Patient states he tested positive at HD unit on 2/8.     Diagnostics: (leukopenia, hyponatremia, hyperferritinemia, elevated troponin, elevated d-dimer, age, and comorbidities are significant predictors of poor clinical outcome)  CBC, CMP, Procalcitonin, Ferritin, CRP, LDH, BNP, ECG, Blood Culture x2, Portable CXR, UA and culture, PTT and INR    Management: Initiate targeted therapy with Remdesivir, 200mg  IV x1, followed by 100mg IV daily x5 days total and Dexamethasone PO/IV 6mg daily x10 days, Maintain oxygen saturations 92-96% via Nasal Cannula  LPM and monitor with continuous/intermittent pulse oximetry. , Inhaled bronchodilators as needed for shortness of breath., Continuous cardiac monitoring. and Manage respiratory failure (O2 requirement >10LPM or needing NIPPV/Mechanical ventilation) and/or Pneumonia (active chest infiltrates) separately as described below.  - satting well on Room air  - heparin drip continued, was started at outside hospital (was initially on Lovenox)   -ID consulted;  initially started on zithromax and rocephin then stopped per ID recs; monitor respiratory culture       Advance Care Planning  Current advance care plan has been discussed with patient/family/POA and patient currently wishes Full Code.        Post viral debility  Assessed by PT/OT and home health recommended  -patient's daughter will come to stay with him when he is discharged - requests notification as soon as possible      ESRD (end stage renal disease)  Patient is on dialysis Tuesday Thursday and Saturday, however over the past week outside hospital he has been receiving dialysis Monday Wednesday and Friday, with most recent being Friday 11th on COVID isolation unit per patient    - consult nephrology, inpatient HD  - phosphate binder t.i.d. and renal diet  - avoid nephrotoxic agents  - monitor CMP  - Will need to find COVID dialysis chair    Hypertension associated with diabetes  Echo 2/14:   · Technically very difficult study  · The left ventricle is normal in size with concentric remodeling and low normal systolic function. The estimated ejection fraction is 50%.  · Normal left ventricular diastolic function.  · The right heart and IVC were not well visualized.  -Continue home clonidine 0.1mg TID, valsartan 160mg BID, amlodipine 10mg qd and Coreg 3.125mg BID  -will continue to monitor and adjust regimen as  necessary        Mixed hyperlipidemia  home statin rosuvastatin 10mg  -Lipitor 40mg qd while inpatient      CMV (cytomegalovirus infection) status positive  Cytomegalovirus DNA Detec...   Cytomegalovirus Log (copies/mL) <1.70   Cytomegalovirus PCR, Quant <50     - Quant is low, so will not treat  -Transplant Pulm following  - Hold MMF    Type 2 diabetes mellitus with hyperglycemia, with long-term current use of insulin  -Last A1c reviewed-   Lab Results   Component Value Date    HGBA1C 8.1 (H) 12/13/2021     Home Antihyperglycemic Regiment:  -LDSSI, Aspart 10 u TID and deglu 20 u QHS    Inpatient Antihyperglycemic Regiment:  Antihyperglycemics (From admission, onward)            Start     Stop Route Frequency Ordered    02/14/22 1645  insulin aspart U-100 pen 10 Units         -- SubQ 3 times daily with meals 02/14/22 1208    02/14/22 1308  insulin aspart U-100 pen 1-10 Units         -- SubQ Before meals & nightly PRN 02/14/22 1208    02/14/22 1245  insulin detemir U-100 pen 26 Units         -- SubQ 2 times daily 02/14/22 1208        - Detemir 26 units BID   - SSI with POCT accuchecks ACHS and Diabetic diet 2000 byron  - Diabetic nutritional counseling given   - Goal 140-180 while IP  - will continue to monitor and adjust regimen as necessary            Prophylactic antibiotic  Continue bactrim and zithromax prophylaxis every M,W,F      Lung replaced by transplant  Patient status post lung transplant x2  [place] [facility] [place]  Patient is on home Prograf and everolimus    - HOLD home Prograf and continue everolimus  - continue weaning oxygen as tolerated - he has home oxygen for prn use  - transplant Pulm consulted  - ID consulted      Immunosuppression  - Hold Tacrolimus and will followup outpatient  - Hold MMF due to resistant CMV  - Cont everolimus  - transplant team monitoring tacrolimus level and will advise when to resume;             VTE Risk Mitigation (From admission, onward)          Ordered     apixaban tablet 5 mg  2 times daily         02/15/22 1521     IP VTE HIGH RISK PATIENT  Once         02/13/22 0034     Place sequential compression device  Until discontinued         02/13/22 0034                I have assessed these findings virtually using a telemed platform and with assistance of the bedside nurse.          The attending portion of this evaluation, treatment, and documentation was performed per Emma Griggs MD via Telemedicine AudioVisual using the secure [employer] software platform with 2 way audio/video. The provider was located off-site and the patient is located in the hospital. The aforementioned video software was utilized to document the relevant history and physical exam    Emma Griggs MD  Department of Hospital Medicine   [facility] - Intensive Care ([place]-16)

## 2022-02-17 NOTE — PLAN OF CARE
Patient resting quietly in bed with 2hr run of HD session progress when CM rounded. CM informed the patient of short run of HD today, that he will be discharged today, & that there is an HD session scheduled for the patient at 1330 tomorrow at [facility]. Patient verbalized understanding. TORREY informed nurse He of above. He stated that he informed the patient's daughter of the discharge status & was told that the patient's brother will provide transportation at time of discharge.

## 2022-02-17 NOTE — ASSESSMENT & PLAN NOTE
Echo 2/14:   · Technically very difficult study  · The left ventricle is normal in size with concentric remodeling and low normal systolic function. The estimated ejection fraction is 50%.  · Normal left ventricular diastolic function.  · The right heart and IVC were not well visualized.  -Continue home clonidine 0.1mg TID, valsartan 160mg BID, amlodipine 10mg qd and Coreg 3.125mg BID  -will continue to monitor and adjust regimen as necessary

## 2022-02-17 NOTE — ASSESSMENT & PLAN NOTE
Patient status post lung transplant x2  Transfer to [facility] for lung transplant services  Patient is on home Prograf and everolimus    - HOLD home Prograf and continue everolimus  - continue weaning oxygen as tolerated - he has home oxygen for prn use  - transplant Pulm consulted  - ID consulted

## 2022-02-17 NOTE — TELEPHONE ENCOUNTER
SW received call from pt's home dialysis unit (ph: [phone]) who stated pt will report to dialysis tomorrow 2/18/22 at [facility], as this is where the COVID + cohort is receiving dialysis at this time. Pt scheduled for 1:30 PM. Pt will return to his home dialysis unit next week. DEV relayed this information to pt's nurse coordinators HERSON Estrada RN and ROB Holloway RN. SW following and remains available.

## 2022-02-17 NOTE — PLAN OF CARE
Problem: Adult Inpatient Plan of Care  Goal: Plan of Care Review  Outcome: Ongoing, Progressing  Goal: Optimal Comfort and Wellbeing  Outcome: Ongoing, Progressing  Goal: Readiness for Transition of Care  Outcome: Ongoing, Progressing     Problem: Diabetes Comorbidity  Goal: Blood Glucose Level Within Targeted Range  Outcome: Ongoing, Progressing     Problem: Infection  Goal: Absence of Infection Signs and Symptoms  Outcome: Ongoing, Progressing     Problem: Fall Injury Risk  Goal: Absence of Fall and Fall-Related Injury  Outcome: Ongoing, Progressing     Problem: Hemodynamic Instability (Hemodialysis)  Goal: Effective Tissue Perfusion  Outcome: Ongoing, Progressing     Patient aaox4. Accu checks AC/HS. Vitals wnl. Remains on room air. No c/o pain. Monitoring.   2nd trop drawn labeled and sent for testing. Pt in NAD resting comfortably in bed. Will cotninue to monitor.      Prema White RN  08/11/19 [id number]

## 2022-02-17 NOTE — PROGRESS NOTES
Dialysis completed. Fife removed from LFA fistula with pressure held to sites for 10 minutes each with hemostasis achieved. Gauze and tape applied. Patient dialyzed for 3 hours with fluid removal of 2 liters. Tolerated well with stable vital signs. Report to Carri.

## 2022-02-17 NOTE — PROGRESS NOTES
Discharge home per the hospital medicine team is planned for today. Called the patient to discuss the follow up plan. Informed patient that his hemodialysis schedule will be altered temporarily due to his COVID-positive status, with his next HD session to be conducted at the [facility] location tomorrow. At this time, he should be able to return to his local HD center next week with the schedule, MWF vs TTS, to be determined. Instructed patient to call the lung transplant office once he knows his HD schedule for the coming weeks so we can adjust his appointments accordingly. Explained that we will keep the appointments he has scheduled on Monday, 2/21/22 unless he notifies us that they need to be changed. Instructed patient to report to the 2nd floor lab at 8:00 for fasting labs then to the 11th floor dermatology clinic for his 8:30 appointment on 2/21. The patient repeated all instructions correctly and all questions at this time were answered to his satisfaction.

## 2022-02-17 NOTE — PLAN OF CARE
Problem: Occupational Therapy Goal  Goal: Occupational Therapy Goal  Description: Goals to be met by:  2/28/2022    Patient will increase functional independence with ADLs by performing:    Feeding with [place].  UE Dressing with [place].  LE Dressing with Modified [place].  Grooming while standing at sink with [place].  Toileting from toilet with [place] for hygiene and clothing management.   Bathing from  shower chair/bench with Modified [place].  Toilet transfer to toilet with Modified [place].    Outcome: Ongoing, Progressing     Problem: Occupational Therapy Goal  Goal: Occupational Therapy Goal  Description: Goals to be met by:  2/28/2022    Patient will increase functional independence with ADLs by performing:    Feeding with [place].  UE Dressing with [place].  LE Dressing with Modified [place].  Grooming while standing at sink with [place].  Toileting from toilet with [place] for hygiene and clothing management.   Bathing from  shower chair/bench with Modified [place].  Toilet transfer to toilet with Modified [place].    Outcome: Ongoing, Progressing

## 2022-02-17 NOTE — ASSESSMENT & PLAN NOTE
- Hold Tacrolimus and will followup outpatient  - Hold MMF due to resistant CMV  - Cont everolimus  - transplant team monitoring tacrolimus level and will advise when to resume;

## 2022-02-17 NOTE — PLAN OF CARE
DEV [facility] HH [facility] via [employer] for review. DEV will continue to follow.      02/17/22 8814   Post-Acute Status   Post-Acute Authorization Home Health   Home Health Status Referrals Sent     Mary Lopez LMSW   - [facility]  Ext. [phone]

## 2022-02-17 NOTE — PROGRESS NOTES
Spoke with MICHEL Xavier NP treatment time changed to 2 hrs and pt to attend scheduled dialysis treatment on tomorrow. Pt informed of plan and case management spoke with pt at bedside for treatment time outpatient tomorrow.

## 2022-02-18 ENCOUNTER — NURSE TRIAGE (OUTPATIENT)
Dept: ADMINISTRATIVE | Facility: CLINIC | Age: 62
End: 2022-02-18
Payer: MEDICARE

## 2022-02-18 ENCOUNTER — PATIENT OUTREACH (OUTPATIENT)
Dept: ADMINISTRATIVE | Facility: CLINIC | Age: 62
End: 2022-02-18
Payer: MEDICARE

## 2022-02-18 ENCOUNTER — TELEPHONE (OUTPATIENT)
Dept: TRANSPLANT | Facility: CLINIC | Age: 62
End: 2022-02-18
Payer: MEDICARE

## 2022-02-18 LAB
BACTERIA BLD CULT: NORMAL
BACTERIA BLD CULT: NORMAL
BACTERIA SPEC AEROBE CULT: NORMAL
BACTERIA SPEC AEROBE CULT: NORMAL
GRAM STN SPEC: NORMAL

## 2022-02-18 NOTE — NURSING
Pt. DC to home with HH. AVS reviewed. Bedside meds delivered.   Pt. verbalized the DC instructions. HD completed. Vitals stable.   BG 74-no insulin given. Pt. refused to eat dinner. Ate crackers and drink juice. BG came up. Midline removed before DC.   Pt. left unit on WC (with O2) with all personal belongings accompanied by his brother Mr. Troy. Pt. has home O2 tank in car for going home.

## 2022-02-18 NOTE — TELEPHONE ENCOUNTER
Called patient to discuss the plan for future hemodialysis sessions and clinic appointments. Received no answer but left a voice message asking for a return call.        [id number]: Received a return call from the patient's daughter Sybil, who stated her father asked her to get the update for his plan of care. Explained that the patient should report for his outpatient HD session in [place] today as scheduled and then he will be transitioned back to his local HD center next but on a MWF schedule, not his usual TTS schedule. With the change to his HD schedule, the following appointments will be adjusted:  1. Fasting labs - changed to Tuesday, 2/22/22 at 0800 at the [facility] location.  2. Dermatology - to be moved from Monday, 2/21/22 to a date TBD on a Tuesday or Thursday by the dermatology department.  3. Lung transplant follow up (chest x-ray, fasting labs, spirometry, and clinic visit) - moved to Tuesday, 3/8/22.   Reviewed times and locations for all appointments, including the family medicine appointment scheduled at the [facility] location on Thursday, 2/24/22. Also asked Ms. Hendrix to verify that her father changed his tacrolimus (Prograf) dose to 1 mg twice a day. Added that her father should continue all other home medications as previously prescribed.   Ms. Hendrix stated she wrote down all instructions and then she repeated them back to me correctly. She verbalized her understanding of all information discussed and all questions at this time were answered to her satisfaction.

## 2022-02-18 NOTE — TELEPHONE ENCOUNTER
2nd enrollment call - Pt currently at dialysis request call back in morning. Pt does have complaints regarding portable oxygen concentration tank and agrees to contact DME when he returns home from dialysis. Pt  Does report home concentrator works well without difficulty. My chart message sent for program self enrollment and instructed to call OOC at listed number for any worsening of symptoms.     Reason for Disposition   Information only question and nurse able to answer    Protocols used: INFORMATION ONLY CALL - NO TRIAGE-A-OH

## 2022-02-18 NOTE — PLAN OF CARE
[facility] ([facility])  Discharge Final Note    Primary Care Provider: Michel Henley MD    Expected Discharge Date: 2/17/2022    Final Discharge Note (most recent)       Final Note - 02/18/22 1121          Final Note    Assessment Type Final Discharge Note     Anticipated Discharge Disposition Home-Health Care [facility]   [facility]                    Important Message from Medicare             Contact Info       DAVITA TRACE    [address] [place]  [facility] [address]   Phone: [phone]       Next Steps: Follow up on 2/18/2022    Instructions: at 1:30 PM for HD treatment    Serg Lerma NP   Specialty: Family Medicine    [address]  [place] [address]   Phone: [phone]       Next Steps: Follow up on 2/24/2022    Instructions: at 9:40 AM.    [facility] - Transplant 1st Fl   Specialty: Transplant    [address]  [facility] [address]   Phone: [phone]       Next Steps: Call    Instructions: Repeat labwork, As previously planned    [facility] DERMATOLOGY   Specialty: Dermatology    [address]  [facility] [address]   Phone: [phone]       Next Steps: Schedule an appointment as soon as possible for a visit    Instructions: To establish care

## 2022-02-18 NOTE — PROGRESS NOTES
HD complete. Net removal 1100 ml. Pt tolerated well. VSS. Pt awake, alert putting on clothes for discharge home.

## 2022-02-19 ENCOUNTER — NURSE TRIAGE (OUTPATIENT)
Dept: ADMINISTRATIVE | Facility: CLINIC | Age: 62
End: 2022-02-19
Payer: MEDICARE

## 2022-02-19 ENCOUNTER — PATIENT MESSAGE (OUTPATIENT)
Dept: ADMINISTRATIVE | Facility: OTHER | Age: 62
End: 2022-02-19
Payer: MEDICARE

## 2022-02-19 PROCEDURE — G0180 MD CERTIFICATION HHA PATIENT: HCPCS | Mod: ,,, | Performed by: FAMILY MEDICINE

## 2022-02-19 PROCEDURE — G0180 PR HOME HEALTH MD CERTIFICATION: ICD-10-PCS | Mod: ,,, | Performed by: FAMILY MEDICINE

## 2022-02-19 NOTE — TELEPHONE ENCOUNTER
Received medium Priority Escalation via COVID Surveillance Program. Patient submitted the following abnormal data 3/5 SOB with activity Patient called. Patient reports the following feeling the same as earlier. No worsening in SOB or SO with activity. No decrease in amount of activity which he can tolerate. Speaking in complete sentences without difficulty. No audible wheezes noted. No additional questions at this time. Advised to call back with concerns or worsening condition. Verbalized understanding.     Reason for Disposition   Pulse oximetry, questions about    Additional Information   Negative: SEVERE difficulty breathing (e.g., struggling for each breath, speaks in single words, pulse > 120)   Negative: Bluish (or gray) lips or face now   Negative: Difficult to awaken or acting confused (e.g., disoriented, slurred speech)   Negative: Slow, shallow and weak breathing   Negative: Sounds like a life-threatening emergency to the triager   Negative: [1] MODERATE difficulty breathing (e.g., speaks in phrases, SOB even at rest, pulse 100 - 120) AND [2] new-onset or worse than normal   Negative: [1] MODERATE difficulty breathing AND [2] oxygen level (e.g., pulse oximetry) 91 to 94 percent   Negative: Oxygen level (e.g., pulse oximetry) 90 percent or lower   Negative: Patient sounds very sick or weak to the triager   Negative: [1] MODERATE difficulty breathing (e.g., speaks in phrases, SOB even at rest, pulse 100 - 120) AND [2] NOT new-onset or worse than normal   Negative: [1] Drinking very little AND [2] dehydration suspected (e.g., no urine > 12 hours, very dry mouth, very lightheaded)   Negative: [1] MILD difficulty breathing (e.g., minimal/no SOB at rest, SOB with walking, pulse <100) AND [2] new-onset   Negative: Fever > 100.4 F (38.0 C)   Negative: Nurse judgment   Negative: [1] Fall in oxygen level 4% or more (below known patient baseline, while awake and resting) AND [2] new or worse  difficulty breathing   Negative: Oxygen level (e.g., pulse oximetry) 91 to 94 percent   Negative: Pulse (heart rate) > 120 beats per minute   Negative: [1] Caller has URGENT question AND [2] triager unable to answer question   Negative: [1] MILD difficulty breathing  (e.g., minimal/no SOB at rest, SOB with walking) AND [2] worse than normal   Negative: Fever present > 3 days (72 hours)   Negative: [1] Fever returns after gone for over 24 hours AND [2] symptoms worse or not improved    Protocols used: OXYGEN MONITORING AND [id number]

## 2022-02-19 NOTE — TELEPHONE ENCOUNTER
Pt called for SOB O2  sat 96 SOB level 0/3 and pt triaged and care advice is home care pt will reach out if he is having any trouble.  Pt will conitnue to monitor he said that he is not worse from when D/C'd from facility will keep in touch and given OOC number to call if any problems or questions       fReason for Disposition   Pulse oximetry, questions about    Additional Information   Negative: SEVERE difficulty breathing (e.g., struggling for each breath, speaks in single words, pulse > 120)   Negative: Bluish (or gray) lips or face now   Negative: Difficult to awaken or acting confused (e.g., disoriented, slurred speech)   Negative: Slow, shallow and weak breathing   Negative: Sounds like a life-threatening emergency to the triager   Negative: [1] MODERATE difficulty breathing (e.g., speaks in phrases, SOB even at rest, pulse 100 - 120) AND [2] new-onset or worse than normal   Negative: [1] MODERATE difficulty breathing AND [2] oxygen level (e.g., pulse oximetry) 91 to 94 percent   Negative: Oxygen level (e.g., pulse oximetry) 90 percent or lower   Negative: Patient sounds very sick or weak to the triager   Negative: [1] MODERATE difficulty breathing (e.g., speaks in phrases, SOB even at rest, pulse 100 - 120) AND [2] NOT new-onset or worse than normal   Negative: [1] Drinking very little AND [2] dehydration suspected (e.g., no urine > 12 hours, very dry mouth, very lightheaded)   Negative: [1] MILD difficulty breathing (e.g., minimal/no SOB at rest, SOB with walking, pulse <100) AND [2] new-onset   Negative: Fever > 100.4 F (38.0 C)   Negative: Nurse judgment   Negative: [1] Fall in oxygen level 4% or more (below known patient baseline, while awake and resting) AND [2] new or worse difficulty breathing   Negative: Oxygen level (e.g., pulse oximetry) 91 to 94 percent   Negative: Pulse (heart rate) > 120 beats per minute   Negative: [1] Caller has URGENT question AND [2] triager unable to  answer question   Negative: [1] MILD difficulty breathing  (e.g., minimal/no SOB at rest, SOB with walking) AND [2] worse than normal   Negative: Fever present > 3 days (72 hours)   Negative: [1] Fever returns after gone for over 24 hours AND [2] symptoms worse or not improved   Negative: [1] Receiving oxygen therapy (e.g., nasal cannula, mask) AND [2] oxygen level 96% or higher AND [3] three times in 1 week   Negative: Pulse (heart rate) > 100 beats per minute   Negative: Coughing up yellow-green sputum   Negative: Coughing up joe-colored (reddish-brown) sputum   Negative: [1] Caller has NON-URGENT question AND [2] triager unable to answer question   Negative: [1] Fall in oxygen level 2% or more (below known patient baseline, awake while at rest) AND [2] three times in 1 week   Negative: [1] Rise in oxygen level 2% or more (above known patient baseline, awake while at rest) AND [2] three times in 1 week.    Protocols used: OXYGEN MONITORING AND [id number]

## 2022-02-20 ENCOUNTER — NURSE TRIAGE (OUTPATIENT)
Dept: ADMINISTRATIVE | Facility: CLINIC | Age: 62
End: 2022-02-20
Payer: MEDICARE

## 2022-02-20 ENCOUNTER — PATIENT MESSAGE (OUTPATIENT)
Dept: ADMINISTRATIVE | Facility: OTHER | Age: 62
End: 2022-02-20
Payer: MEDICARE

## 2022-02-20 NOTE — TELEPHONE ENCOUNTER
Pt contacted for Surveillance escalation - C/O Sob 3/5 with activity reports it has not increased but improved and is feeling better. Info only  protocol used and pt advised on home care. Pt instructed to call OOC for worsening of symptoms or health questions.    Reason for Disposition   Information only question and nurse able to answer    Protocols used: INFORMATION ONLY CALL - NO TRIAGE-A-OH

## 2022-02-20 NOTE — TELEPHONE ENCOUNTER
Pt called due to reporting 3/5 SOB with activity. Pt denies worsening SOB at this time,and denies CP. Denies further needs. Encouraged to call OOC for concerns    Reason for Disposition   Health Information question, no triage required and triager able to answer question    Protocols used: INFORMATION ONLY CALL - NO TRIAGE-A-

## 2022-02-21 ENCOUNTER — PATIENT OUTREACH (OUTPATIENT)
Dept: ADMINISTRATIVE | Facility: HOSPITAL | Age: 62
End: 2022-02-21
Payer: MEDICARE

## 2022-02-21 ENCOUNTER — PATIENT MESSAGE (OUTPATIENT)
Dept: ADMINISTRATIVE | Facility: CLINIC | Age: 62
End: 2022-02-21
Payer: MEDICARE

## 2022-02-21 ENCOUNTER — NURSE TRIAGE (OUTPATIENT)
Dept: ADMINISTRATIVE | Facility: CLINIC | Age: 62
End: 2022-02-21
Payer: MEDICARE

## 2022-02-21 NOTE — PT/OT/SLP DISCHARGE
Occupational Therapy Discharge Summary    Martin Hendrix Jr.  MRN: [id number]   Principal Problem: COVID-19      Patient Discharged from acute Occupational Therapy on 2/17/22.      Assessment:      Patient was discharged unexpectedly.  Information required to complete an accurate discharge summary is unknown.  Refer to therapy initial evaluation and last progress note for initial and most recent functional status and goal achievement.  Recommendations made may be found in medical record.    Objective:     GOALS:   Multidisciplinary Problems     Occupational Therapy Goals        Problem: Occupational Therapy Goal    Goal Priority Disciplines Outcome Interventions   Occupational Therapy Goal     OT, PT/OT Ongoing, Progressing    Description: Goals to be met by:  2/28/2022    Patient will increase functional independence with ADLs by performing:    Feeding with [place].  UE Dressing with [place].  LE Dressing with Modified [place].  Grooming while standing at sink with [place].  Toileting from toilet with [place] for hygiene and clothing management.   Bathing from  shower chair/bench with Modified [place].  Toilet transfer to toilet with Modified [place].                     Reasons for Discontinuation of Therapy Services  Transfer to alternate level of care.      Plan:     Patient Discharged to: Home with Home Health Service    2/21/2022

## 2022-02-21 NOTE — TELEPHONE ENCOUNTER
Pt escalated due to no response. Offered to assist pt with VS entry. Pt declined. Denies further needs at this time. Encouraged to call OOC for further needs    Reason for Disposition   [1] Follow-up call to recent contact AND [2] information only call, no triage required    Protocols used: INFORMATION ONLY CALL - NO TRIAGE-A-

## 2022-02-21 NOTE — TELEPHONE ENCOUNTER
Pt escalated due to no response. Pt called with contact made. Will input VS. Pt denies further needs at this time    Reason for Disposition   Information only question and nurse able to answer    Protocols used: INFORMATION ONLY CALL - NO TRIAGE-A-OH

## 2022-02-21 NOTE — PROGRESS NOTES
Called patient to confirm hospital follow up scheduled on 02/22/2022. Patient is confirmed. Scheduled next 1c for 03/15/2022.

## 2022-02-22 ENCOUNTER — HOSPITAL ENCOUNTER (OUTPATIENT)
Dept: RADIOLOGY | Facility: HOSPITAL | Age: 62
Discharge: HOME | End: 2022-02-22
Attending: NURSE PRACTITIONER
Payer: MEDICARE

## 2022-02-22 ENCOUNTER — TELEPHONE (OUTPATIENT)
Dept: TRANSPLANT | Facility: CLINIC | Age: 62
End: 2022-02-22
Payer: MEDICARE

## 2022-02-22 ENCOUNTER — NURSE TRIAGE (OUTPATIENT)
Dept: ADMINISTRATIVE | Facility: CLINIC | Age: 62
End: 2022-02-22
Payer: MEDICARE

## 2022-02-22 ENCOUNTER — PATIENT MESSAGE (OUTPATIENT)
Dept: ADMINISTRATIVE | Facility: OTHER | Age: 62
End: 2022-02-22
Payer: MEDICARE

## 2022-02-22 ENCOUNTER — OFFICE VISIT (OUTPATIENT)
Dept: FAMILY MEDICINE | Facility: CLINIC | Age: 62
End: 2022-02-22
Payer: MEDICARE

## 2022-02-22 VITALS
WEIGHT: 217.69 LBS | OXYGEN SATURATION: 95 % | SYSTOLIC BLOOD PRESSURE: 126 MMHG | DIASTOLIC BLOOD PRESSURE: 80 MMHG | HEIGHT: 71 IN | HEART RATE: 100 BPM | BODY MASS INDEX: 30.48 KG/M2

## 2022-02-22 DIAGNOSIS — R06.02 SHORTNESS OF BREATH: ICD-10-CM

## 2022-02-22 DIAGNOSIS — Z94.2 LUNG REPLACED BY TRANSPLANT: ICD-10-CM

## 2022-02-22 DIAGNOSIS — U07.1 COVID-19: ICD-10-CM

## 2022-02-22 DIAGNOSIS — N18.6 ESRD (END STAGE RENAL DISEASE) ON DIALYSIS: ICD-10-CM

## 2022-02-22 DIAGNOSIS — Z99.2 ESRD (END STAGE RENAL DISEASE) ON DIALYSIS: ICD-10-CM

## 2022-02-22 DIAGNOSIS — Z09 HOSPITAL DISCHARGE FOLLOW-UP: Primary | ICD-10-CM

## 2022-02-22 PROCEDURE — 99496 TRANSITIONAL CARE MANAGE SERVICE 7 DAY DISCHARGE: ICD-10-PCS | Mod: S$GLB,,, | Performed by: NURSE PRACTITIONER

## 2022-02-22 PROCEDURE — 4010F ACE/ARB THERAPY RXD/TAKEN: CPT | Mod: CPTII,S$GLB,, | Performed by: NURSE PRACTITIONER

## 2022-02-22 PROCEDURE — 3072F LOW RISK FOR RETINOPATHY: CPT | Mod: CPTII,S$GLB,, | Performed by: NURSE PRACTITIONER

## 2022-02-22 PROCEDURE — 1160F RVW MEDS BY RX/DR IN RCRD: CPT | Mod: CPTII,S$GLB,, | Performed by: NURSE PRACTITIONER

## 2022-02-22 PROCEDURE — 1159F MED LIST DOCD IN RCRD: CPT | Mod: CPTII,S$GLB,, | Performed by: NURSE PRACTITIONER

## 2022-02-22 PROCEDURE — 71046 X-RAY EXAM CHEST 2 VIEWS: CPT | Mod: 26,,, | Performed by: RADIOLOGY

## 2022-02-22 PROCEDURE — 1160F PR REVIEW ALL MEDS BY PRESCRIBER/CLIN PHARMACIST DOCUMENTED: ICD-10-PCS | Mod: CPTII,S$GLB,, | Performed by: NURSE PRACTITIONER

## 2022-02-22 PROCEDURE — 3079F PR MOST RECENT DIASTOLIC BLOOD PRESSURE 80-89 MM HG: ICD-10-PCS | Mod: CPTII,S$GLB,, | Performed by: NURSE PRACTITIONER

## 2022-02-22 PROCEDURE — 3072F PR LOW RISK FOR RETINOPATHY: ICD-10-PCS | Mod: CPTII,S$GLB,, | Performed by: NURSE PRACTITIONER

## 2022-02-22 PROCEDURE — 71046 XR CHEST PA AND LATERAL: ICD-10-PCS | Mod: 26,,, | Performed by: RADIOLOGY

## 2022-02-22 PROCEDURE — 99999 PR PBB SHADOW E&M-EST. PATIENT-LVL V: ICD-10-PCS | Mod: PBBFAC,,, | Performed by: NURSE PRACTITIONER

## 2022-02-22 PROCEDURE — 3074F SYST BP LT 130 MM HG: CPT | Mod: CPTII,S$GLB,, | Performed by: NURSE PRACTITIONER

## 2022-02-22 PROCEDURE — 1159F PR MEDICATION LIST DOCUMENTED IN MEDICAL RECORD: ICD-10-PCS | Mod: CPTII,S$GLB,, | Performed by: NURSE PRACTITIONER

## 2022-02-22 PROCEDURE — 4010F PR ACE/ARB THEARPY RXD/TAKEN: ICD-10-PCS | Mod: CPTII,S$GLB,, | Performed by: NURSE PRACTITIONER

## 2022-02-22 PROCEDURE — 3074F PR MOST RECENT SYSTOLIC BLOOD PRESSURE < 130 MM HG: ICD-10-PCS | Mod: CPTII,S$GLB,, | Performed by: NURSE PRACTITIONER

## 2022-02-22 PROCEDURE — 3079F DIAST BP 80-89 MM HG: CPT | Mod: CPTII,S$GLB,, | Performed by: NURSE PRACTITIONER

## 2022-02-22 PROCEDURE — 3008F BODY MASS INDEX DOCD: CPT | Mod: CPTII,S$GLB,, | Performed by: NURSE PRACTITIONER

## 2022-02-22 PROCEDURE — 3008F PR BODY MASS INDEX (BMI) DOCUMENTED: ICD-10-PCS | Mod: CPTII,S$GLB,, | Performed by: NURSE PRACTITIONER

## 2022-02-22 PROCEDURE — 71046 X-RAY EXAM CHEST 2 VIEWS: CPT | Mod: TC,PO

## 2022-02-22 PROCEDURE — 99496 TRANSJ CARE MGMT HIGH F2F 7D: CPT | Mod: S$GLB,,, | Performed by: NURSE PRACTITIONER

## 2022-02-22 PROCEDURE — 99999 PR PBB SHADOW E&M-EST. PATIENT-LVL V: CPT | Mod: PBBFAC,,, | Performed by: NURSE PRACTITIONER

## 2022-02-22 NOTE — PROGRESS NOTES
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 Peacock  - Hemodialysis TTS schedule    BMP  Lab Results   Component Value Date     02/22/2022    K 4.7 02/22/2022    CL 95 02/22/2022    CO2 30 (H) 02/22/2022    BUN 46 (H) 02/22/2022    CREATININE 8.4 (H) 02/22/2022    CALCIUM 9.1 02/22/2022    ANIONGAP 12 02/22/2022    ESTGFRAFRICA 7.2 (A) 02/22/2022    EGFRNONAA 6.3 (A) 02/22/2022                 Other    COVID-19      Other Visit Diagnoses     Hospital discharge follow-up    -  Primary    Shortness of breath        Relevant Orders    X-Ray Chest PA And Lateral (Completed)        Follow up in about 1 week (around 3/1/2022), or if symptoms worsen or fail to improve, for Follow up with Dr. Henley.    Gela Pollack, SAUNDRA  _____________________________________________________________________________________________________________________________________________________    CC: hospital follow up     HPI: Patient is a 61-year-old female who presents in clinic today with his daughter as an established patient here for hospital follow up. Patient was seen at [facility] from 2/12 - 2/17. Recent encounter reviewed:     HPI: Patient is 61-year-old with did lung transplant 2017 (cb acute rejection), KRISTYN on CPAP, pulmonary hypertension, sarcoidosis, T2DM, HLD, ESRD on HD TTS ( most recent February 9th), presented to [facility] on the 11th with dyspnea worsening over past 2 weeks.  Patient reported oxygen saturation of 88% on room air, he is on home oxygen as needed during exertion however was using it more often and at rest.  Reports requiring higher level of oxygen up to 5 L  even at rest, which is unusual for him.  Today, the patient reports improvements in symptoms however he continues to be short of breath and quickly becomes tachypneic with worsening oxygen sat on ambulation.     At the outside hospital emergency room patient was COVID positive, chest x-ray with mid and lower lung interstitial disease (improved from previous).  Patient was requiring 4 L of oxygen satting 95%, was started on COVID protocol with Rocephin, azithromycin, dexamethasone and weight based Lovenox.  Due to patient's history of  lung transplant decision was made to transfer to [facility] for transplant pulmonology evaluation. He was admitted to [facility] while awaiting a bed at [facility].  His labs were as follow:  COVID positive (February 11), INR 1.13, D-dimer 4226, BNP 97.1, troponin less than 0.03, CPK 93, sodium 130, potassium 5.4 (hemolyzed)-repeat potassium 4.8, chloride 91, CO2 18, , creatinine 19.07, glucose 125, AST 38, ALT 19, white blood cells 9.5, hemoglobin 11.3, hematocrit 33.7, platelets 284, lactic acid 0.9 ABG with pH 7.33/pCO2 34/PO2 80 (2 L nasal cannula) VS: Temperature 98.3°, pulse 120, blood pressure 160/90, oxygen saturation 99%.     Lung replaced by transplant   Patient status post lung transplant x2 (2017)  Transfer to [facility] for lung transplant services  Patient is on home Prograf and everolimus     - continue home Prograf and everolimus  - continue weaning oxygen as tolerated  - transplant Pulm consulted  - ID consulted     ESRD (end stage renal disease)  Patient is on dialysis Tuesday Thursday and Saturday, however over the past week outside hospital he has been receiving dialysis Monday Wednesday and Friday, with most recent being Friday 11th.     - consult nephrology  - phosphate binder t.i.d.  - avoid nephrotoxic agents  - monitor CMP     Hypertension associated with diabetes  Continue home clonidine, valsartan, amlodipine and Coreg     Mixed  hyperlipidemia  Continue home statin      Type 2 diabetes mellitus with hyperglycemia, with long-term current use of insulin  -Last A1c reviewed-         Lab Results   Component Value Date     HGBA1C 8.1 (H) 12/13/2021      Since hospital discharge patient continues to have shortness of breath and weakness. He is using 2-4L NC contiguously. There has been no fever. He has resumed TTS HD scheduled. He currently has [facility] and PT twice weekly. There has been multiple discussions regarding inpatient rehab. Discussed with patient today and he declined. He has a follow up with pulmonology scheduled next week.     Transitional Care Note  Family and/or Caretaker present at visit?  Daughter present  Diagnostic tests reviewed/disposition: No diagnosic tests pending after this hospitalization.  Disease/illness education: He understands detail of recent admission   Home health/community services discussion/referrals: Patient does home health twice weekly    Establishment or re-establishment of referral orders for community resources: No other necessary community resources.   Discussion with other health care providers: Recommend close follow up with pulmonology and nephrology.     Past Medical History:  Past Medical History:   Diagnosis Date    Acute rejection of lung transplant 11/3/2017    AVILA (acute kidney injury) 8/27/2017    Atrial fibrillation 8/23/2017    CKD (chronic kidney disease) stage 3, GFR 30-59 ml/min     Dr. Peacock    DM (diabetes mellitus) 11/2017     02/22/2021 Insulin x 2017    Hypertension     On home oxygen therapy     KRISTYN on CPAP     Osteopenia     Pancreatitis 2009    hospitalized    Pulmonary hypertension     Pure hypercholesterolemia 11/15/2017    Sarcoidosis     Shortness of breath     Type 2 diabetes mellitus 11/5/2017     Past Surgical History:   Procedure Laterality Date    ABDOMINAL SURGERY      6 weeks old    AV FISTULA PLACEMENT Left 10/13/2021    Procedure:  CREATION, AV FISTULA;  Surgeon: CARLI Thomason II, MD;  Location: [facility] OR 2ND FLR;  Service: Vascular;  Laterality: Left;    BRONCHOSCOPY      BRONCHOSCOPY N/A 8/22/2018    Procedure: flexible bronchoscopy with tissue biopsy CPT 83310;  Surgeon: [facility] Diagnostic Provider;  Location: [facility] OR 2ND FLR;  Service: Endoscopy;  Laterality: N/A;    BRONCHOSCOPY N/A 11/20/2018    Procedure: flexible bronchoscopy with tissue biopy CPT 94027;  Surgeon: [facility] Diagnostic Provider;  Location: [facility] OR 2ND FLR;  Service: Anesthesiology;  Laterality: N/A;    BRONCHOSCOPY N/A 11/10/2021    Procedure: Flexible bronchoscopy;  Surgeon: Samantha Bill DO;  Location: [facility] OR 2ND FLR;  Service: Endoscopy;  Laterality: N/A;    CHEST TUBE INSERTION      CHOLECYSTECTOMY      COLONOSCOPY      ESOPHAGOGASTRODUODENOSCOPY N/A 8/6/2018    Procedure: EGD (ESOPHAGOGASTRODUODENOSCOPY);  Surgeon: Ramu Eisenberg MD;  Location: [facility] ENDO (2ND FLR);  Service: Endoscopy;  Laterality: N/A;  off pepcid and all acid reducers for 2 weeks; PA > 50    FISTULOGRAM Left 1/10/2022    Procedure: Fistulogram;  Surgeon: CARLI Thomason II, MD;  Location: [facility] CATH LAB;  Service: Vascular;  Laterality: Left;    INSERTION OF TUNNELED CENTRAL VENOUS HEMODIALYSIS CATHETER N/A 3/13/2019    Procedure: INSERTION, CATHETER, CENTRAL VENOUS, HEMODIALYSIS, TUNNELED;  Surgeon: Edy Gonzalez MD;  Location: [facility] CATH LAB;  Service: Nephrology;  Laterality: N/A;    INSERTION OF TUNNELED CENTRAL VENOUS HEMODIALYSIS CATHETER Right 5/7/2021    Procedure: Insertion, Catheter, Central Venous, Hemodialysis;  Surgeon: Mary Joshi MD;  Location: [facility] CATH LAB;  Service: Interventional Nephrology;  Laterality: Right;    LUNG BIOPSY      LUNG TRANSPLANT  08/2017    PERCUTANEOUS TRANSLUMINAL ANGIOPLASTY OF ARTERIOVENOUS FISTULA N/A 1/10/2022    Procedure: PTA, AV FISTULA;  Surgeon: CARLI Thomason II, MD;  Location: [facility] CATH LAB;  Service: Vascular;   Laterality: N/A;    REMOVAL OF TUNNELED CENTRAL VENOUS CATHETER (CVC) N/A 5/16/2019    Procedure: REMOVAL, CATHETER, CENTRAL VENOUS, TUNNELED;  Surgeon: Edy Gonzalez MD;  Location: [facility] CATH LAB;  Service: Nephrology;  Laterality: N/A;    REVISION OF ARTERIOVENOUS FISTULA Left 11/18/2021    Procedure: REVISION, AV FISTULA;  Surgeon: CARLI Thomason II, MD;  Location: [facility] OR [facility] FLR;  Service: Vascular;  Laterality: Left;  Ligation of side branches    TONSILLECTOMY       Review of patient's allergies indicates:  No Known Allergies  Social History     Tobacco Use    Smoking status: Never Smoker    Smokeless tobacco: Never Used   Substance Use Topics    Alcohol use: No    Drug use: No     Family History   Problem Relation Age of Onset    Hypertension Mother     Diabetes Father     Blindness Father     Kidney disease Sister     Diabetes Brother      Current Outpatient Medications on File Prior to Visit   Medication Sig Dispense Refill    amLODIPine (NORVASC) 10 MG tablet TAKE 1 TABLET BY MOUTH EVERY DAY 90 tablet 3    azithromycin (Z-REYNA) 250 MG tablet Take 2 tablets (500 mg total) by mouth every Mon, Wed, Fri. 30 tablet 11    carvediloL (COREG) 3.125 MG tablet One po BID, hold if SBP < 130 60 tablet 11    cholecalciferol, vitamin D3, (VITAMIN D3) 25 mcg (1,000 unit) capsule Take 2 capsules (2,000 Units total) by mouth once daily.  0    cinacalcet (SENSIPAR) 30 MG Tab One po QDAY, with largest meal 30 tablet 11    cloNIDine (CATAPRES) 0.1 MG tablet Take 1 tablet (0.1 mg total) by mouth 3 (three) times daily. 90 tablet 11    ECOTRIN LOW STRENGTH 81 mg EC tablet TAKE 1 TABLET DAILY 2 tablet 4    epoetin greer-epbx (RETACRIT) 4,000 unit/mL injection Inject 1.41 mLs (5,640 Units total) into the skin every Tues, Thurs, Sat. Administered by dialysis unit on T/Th/Sat.      everolimus, immunosuppressive, (ZORTRESS) 0.75 mg tablet Take 1 tablet (0.75 mg total) by mouth every 12 (twelve) hours. 60  "tablet 11    folic acid (FOLVITE) 1 MG tablet TAKE 1 TABLET DAILY 90 tablet 4    furosemide (LASIX) 40 MG tablet TAKE 1 TABLET BY MOUTH EVERY DAY AS NEEDED FOR LEG SWELLING 30 tablet 7    insulin aspart U-100 (NOVOLOG FLEXPEN U-100 INSULIN) 100 unit/mL (3 mL) InPn pen Inject 10 Units into the skin 3 (three) times daily with meals. 15 mL 11    insulin aspart U-100 (NOVOLOG) 100 unit/mL (3 mL) InPn pen Inject 0-5 Units into the skin before meals and at bedtime as needed (Hyperglycemia). Use in addition to 10 units TID with meals  0    insulin degludec (TRESIBA FLEXTOUCH U-200) 200 unit/mL (3 mL) insulin pen Inject 30 Units into the skin every evening. 6 pen 4    lancets Misc To check BG 4 times daily, to use with insurance preferred meter 350 each 3    magnesium chloride (MAG 64) 64 mg TbEC Take 2 tablets (128 mg total) by mouth 2 (two) times daily. 120 tablet 11    ondansetron (ZOFRAN-ODT) 8 MG TbDL Dissolve 1 tablet (8 mg total) by mouth daily as needed (pre-med for IVIG infusions). 15 tablet 3    ONETOUCH VERIO Strp USE TO CHECK BLOOD GLUCOSE FOUR TIMES A DAY, TO USE WITH INSURANCE PREFERRED METER 350 each 3    pantoprazole (PROTONIX) 40 MG tablet Take 1 tablet (40 mg total) by mouth once daily. 30 tablet 11    pen needle, diabetic (BD ULTRA-FINE JIM PEN NEEDLE) 32 gauge x 5/32" Ndle Uses 4 times daily, on multiple daily insulin injections 350 each 3    predniSONE (DELTASONE) 5 MG tablet Take 1 tablet (5 mg total) by mouth once daily. 90 tablet 4    pulse oximeter (PULSE OXIMETER) device by Apply Externally route 2 (two) times a day. Use twice daily at 8 AM and 3 PM and record the value in [facility] as directed. 1 each 0    rosuvastatin (CRESTOR) 10 MG tablet Take 1 tablet (10 mg total) by mouth once daily. 30 tablet 3    sevelamer carbonate (RENVELA) 800 mg Tab TAKE 2 TABLETS BY MOUTH 3 TIMES A DAY WITH MEALS. 180 tablet 1    sodium bicarbonate 650 MG tablet Two in am and two in pm 120 tablet 11    " "sulfamethoxazole-trimethoprim 400-80mg (BACTRIM,SEPTRA) 400-80 mg per tablet Take 1 tablet by mouth every Mon, Wed, Fri. 15 tablet 11    tacrolimus (PROGRAF) 0.5 MG Cap Take 2 capsules (1 mg total) by mouth every 12 (twelve) hours. 360 capsule 11    valsartan (DIOVAN) 160 MG tablet Take 1 tablet (160 mg total) by mouth 2 (two) times daily. 180 tablet 3    [DISCONTINUED] ACCU-CHEK DEANNE PLUS METER Misc USE AS DIRECTED  0     Current Facility-Administered Medications on File Prior to Visit   Medication Dose Route Frequency Provider Last Rate Last Admin    0.9%  NaCl infusion   Intravenous Continuous Cait Yan MD        cefazolin (ANCEF) 2 gram in dextrose 5% 50 mL IVPB (premix)  2 g Intravenous On Call Procedure Cait Yan MD         Review of Systems   Constitutional: Positive for activity change and fatigue. Negative for appetite change, chills, fever and unexpected weight change.   HENT: Negative for congestion, rhinorrhea and sore throat.    Eyes: Negative for visual disturbance.   Respiratory: Positive for shortness of breath. Negative for cough.    Cardiovascular: Negative for chest pain, palpitations and leg swelling.   Gastrointestinal: Negative for abdominal pain, diarrhea and vomiting.   Genitourinary: Positive for decreased urine volume (HD patient). Negative for hematuria.   Musculoskeletal: Negative for arthralgias and myalgias.   Skin: Negative for rash and wound.   Neurological: Positive for weakness. Negative for dizziness and headaches.   Psychiatric/Behavioral: Positive for dysphoric mood. Negative for behavioral problems. The patient is not nervous/anxious.      Vitals:    02/22/22 1457   BP: 126/80   BP Location: Right arm   Pulse: 100   SpO2: 95%   Weight: 98.7 kg (217 lb 11.2 oz)   Height: 5' 11" (1.803 m)     Wt Readings from Last 3 Encounters:   02/22/22 98.7 kg (217 lb 11.2 oz)   02/14/22 94.3 kg (208 lb)   02/12/22 94.8 kg (209 lb)     Physical Exam  Vitals reviewed.   Constitutional: "       General: He is not in acute distress.     Appearance: Normal appearance. He is ill-appearing.   HENT:      Head: Normocephalic and atraumatic.      Right Ear: External ear normal.      Left Ear: External ear normal.   Eyes:      Extraocular Movements: Extraocular movements intact.      Conjunctiva/sclera: Conjunctivae normal.   Cardiovascular:      Rate and Rhythm: Regular rhythm.      Heart sounds: Normal heart sounds.   Pulmonary:      Effort: No respiratory distress.      Breath sounds: Wheezing and rales present.      Comments: 95% on 2L NC  Abdominal:      General: Bowel sounds are normal. There is no distension.      Palpations: Abdomen is soft.   Musculoskeletal:         General: Normal range of motion.      Cervical back: Normal range of motion and neck supple.      Comments: AVF to left arm, + thrill and bruit   Skin:     General: Skin is warm and dry.      Capillary Refill: Capillary refill takes less than 2 seconds.      Coloration: Skin is not pale.      Findings: No rash.   Neurological:      Mental Status: He is alert and oriented to person, place, and time. Mental status is at baseline.      Motor: Weakness present.   Psychiatric:         Mood and Affect: Mood normal.         Speech: Speech normal.         Behavior: Behavior normal. Behavior is cooperative.       Health Maintenance   Topic Date Due    TETANUS VACCINE  Never done    Foot Exam  07/21/2021    PROSTATE-SPECIFIC ANTIGEN  10/13/2021    Eye Exam  02/23/2022    Hemoglobin A1c  03/13/2022    Lipid Panel  01/12/2023    Hepatitis C Screening  Completed

## 2022-02-22 NOTE — TELEPHONE ENCOUNTER
Pt called and said that he is doing well his O2 sat 97 and SOB with activity alerted but he said that he's trying to build his endurance and he is ok will reach out if any issues  Reason for Disposition   Information only question and nurse able to answer    Protocols used: INFORMATION ONLY CALL - NO TRIAGE-A-OH

## 2022-02-22 NOTE — TELEPHONE ENCOUNTER
Pt called for escalation of O2 sat 94 and when called pt was sitting in the Drs office for visit this afternoon he will reach out if he needs anything  Reason for Disposition   Information only question and nurse able to answer    Protocols used: INFORMATION ONLY CALL - NO TRIAGE-A-OH

## 2022-02-23 ENCOUNTER — PATIENT MESSAGE (OUTPATIENT)
Dept: ADMINISTRATIVE | Facility: OTHER | Age: 62
End: 2022-02-23
Payer: MEDICARE

## 2022-02-23 NOTE — TELEPHONE ENCOUNTER
2/22:  Received call from patient's daughter regarding dialysis.  Pt has gone back to his home dialysis center and is back on a T/Th/Sa schedule.  Appointments rescheduled to 3/7.

## 2022-02-24 ENCOUNTER — TELEPHONE (OUTPATIENT)
Dept: TRANSPLANT | Facility: CLINIC | Age: 62
End: 2022-02-24
Payer: MEDICARE

## 2022-02-24 ENCOUNTER — PATIENT MESSAGE (OUTPATIENT)
Dept: ADMINISTRATIVE | Facility: CLINIC | Age: 62
End: 2022-02-24
Payer: MEDICARE

## 2022-02-24 ENCOUNTER — PATIENT MESSAGE (OUTPATIENT)
Dept: ADMINISTRATIVE | Facility: OTHER | Age: 62
End: 2022-02-24
Payer: MEDICARE

## 2022-02-24 ENCOUNTER — NURSE TRIAGE (OUTPATIENT)
Dept: ADMINISTRATIVE | Facility: CLINIC | Age: 62
End: 2022-02-24
Payer: MEDICARE

## 2022-02-24 NOTE — TELEPHONE ENCOUNTER
Patient initially escalated due to no response, however patient entered vitals prior to phone call. Vital signs and symptoms did not trigger a second escalation; therefore, no follow up call is needed at this time.         Reason for Disposition   Caller has cancelled the call before the first contact    Protocols used: NO CONTACT OR DUPLICATE CONTACT CALL-A-OH

## 2022-02-24 NOTE — TELEPHONE ENCOUNTER
Notified patient of need to repeat fasting lab in [place] on Monday.  Reminded him to go between 8-9 am and to take morning meds after labs are drawn.  Pt verbalized understanding.    ----- Message from Robina Mcdonald MD sent at 2/24/2022  8:08 AM CST -----  Regarding: RE:  I dont need to see him sooner.  I was just trying to decrease one trip if necessary.  Tac trough next week would be great.  Evero was no adjusted so unless any concerns I dont need that level.  ----- Message -----  From: Sybil Estrada RN  Sent: 2/23/2022   3:52 PM CST  To: Robina Mcdonald MD  Subject: RE:                                              He does not have to come here for blood work, but would you rather see him next week then the week after?  ----- Message -----  From: Robina Mcdonald MD  Sent: 2/23/2022   2:37 PM CST  To: Sybil Estrada RN  Subject: RE:                                              If no changes, Im fine but I believe we adjusted tac due to no HD for sometime.  Is it inconvenient for him to get labs next week? Any chance we can see him next week if he has to come here for blood work?  ----- Message -----  From: Sybil Estrada RN  Sent: 2/23/2022  12:43 PM CST  To: Robina Mcdonald MD    Will he need labs repeated prior to his clinic visit on 3/7?  He had tac and evero drawn at 15:30 yesterday so troughs are inaccurate.

## 2022-02-24 NOTE — TELEPHONE ENCOUNTER
Pt escalated for no response. Spoke to pt who states he just walked in the door from coming back from [place]. Pt is trying to catch his breath from walking in from the car. Just switched his oxygen from his portable tank to his home tank. Pt states he is actually doing better with his SOB. He can move around longer before he becomes SOB and feels tired. Denies any worsening symptoms and he is able to recover quicker. Pt will get vital signs and symptoms submitted. Advised will call back if abnormal. Pt has number to OOC for further concerns. Will continue to monitor.    Reason for Disposition   Information only question and nurse able to answer    Protocols used: INFORMATION ONLY CALL - NO TRIAGE-A-OH

## 2022-02-25 ENCOUNTER — PATIENT MESSAGE (OUTPATIENT)
Dept: ADMINISTRATIVE | Facility: OTHER | Age: 62
End: 2022-02-25
Payer: MEDICARE

## 2022-02-25 ENCOUNTER — NURSE TRIAGE (OUTPATIENT)
Dept: ADMINISTRATIVE | Facility: CLINIC | Age: 62
End: 2022-02-25
Payer: MEDICARE

## 2022-02-25 ENCOUNTER — PATIENT MESSAGE (OUTPATIENT)
Dept: ADMINISTRATIVE | Facility: CLINIC | Age: 62
End: 2022-02-25
Payer: MEDICARE

## 2022-02-25 NOTE — TELEPHONE ENCOUNTER
Pt escalated for no response. Pt states he had fallen asleep. Denies any new or worsening symptoms or new or worsening SOB. Pt states he will get vitals entered. Pt has number to OOC for further concerns. Will continue to monitor.    Reason for Disposition   Information only question and nurse able to answer    Protocols used: INFORMATION ONLY CALL - NO TRIAGE-A-OH

## 2022-02-26 ENCOUNTER — NURSE TRIAGE (OUTPATIENT)
Dept: ADMINISTRATIVE | Facility: CLINIC | Age: 62
End: 2022-02-26
Payer: MEDICARE

## 2022-02-26 ENCOUNTER — PATIENT MESSAGE (OUTPATIENT)
Dept: ADMINISTRATIVE | Facility: CLINIC | Age: 62
End: 2022-02-26
Payer: MEDICARE

## 2022-02-26 ENCOUNTER — PATIENT MESSAGE (OUTPATIENT)
Dept: ADMINISTRATIVE | Facility: OTHER | Age: 62
End: 2022-02-26
Payer: MEDICARE

## 2022-02-26 NOTE — TELEPHONE ENCOUNTER
Pt escalated for no response. Attempted to contact pt with no success. Left voicemail and sent [employer] message. Will continue to monitor.    Reason for Disposition   Message left on unidentified voice mail.  Phone number verified.    Protocols used: NO CONTACT OR DUPLICATE CONTACT CALL-A-

## 2022-02-26 NOTE — TELEPHONE ENCOUNTER
Pt escalated for no response. Spoke to pt who states he was at dialysis this morning which is why he did not enter and he was asleep for this afternoon. Denies any new or worsening SOB or other symptoms. He will get vitals submitted. Pt has number to OOC for further concerns. Will continue to monitor.    Reason for Disposition   [1] Follow-up call to recent contact AND [2] information only call, no triage required    Protocols used: INFORMATION ONLY CALL - NO TRIAGE-A-

## 2022-02-27 ENCOUNTER — PATIENT MESSAGE (OUTPATIENT)
Dept: ADMINISTRATIVE | Facility: OTHER | Age: 62
End: 2022-02-27
Payer: MEDICARE

## 2022-02-28 ENCOUNTER — PATIENT MESSAGE (OUTPATIENT)
Dept: ADMINISTRATIVE | Facility: CLINIC | Age: 62
End: 2022-02-28
Payer: MEDICARE

## 2022-02-28 ENCOUNTER — PATIENT MESSAGE (OUTPATIENT)
Dept: ADMINISTRATIVE | Facility: OTHER | Age: 62
End: 2022-02-28
Payer: MEDICARE

## 2022-02-28 ENCOUNTER — NURSE TRIAGE (OUTPATIENT)
Dept: ADMINISTRATIVE | Facility: CLINIC | Age: 62
End: 2022-02-28
Payer: MEDICARE

## 2022-02-28 NOTE — TELEPHONE ENCOUNTER
Pt escalated for no response. Spoke to pt who states he had dozed off. Pt denies any new or worsening symptoms or new or worsening SOB at this time. Denies any need for further assistance at this time. Pt will get vitals submitted. Pt has number to OOC for further concerns. Will continue to monitor.    Reason for Disposition   Information only question and nurse able to answer    Protocols used: INFORMATION ONLY CALL - NO TRIAGE-A-OH

## 2022-03-01 ENCOUNTER — NURSE TRIAGE (OUTPATIENT)
Dept: ADMINISTRATIVE | Facility: CLINIC | Age: 62
End: 2022-03-01
Payer: MEDICARE

## 2022-03-01 ENCOUNTER — PATIENT MESSAGE (OUTPATIENT)
Dept: ADMINISTRATIVE | Facility: CLINIC | Age: 62
End: 2022-03-01
Payer: MEDICARE

## 2022-03-01 ENCOUNTER — PATIENT MESSAGE (OUTPATIENT)
Dept: ADMINISTRATIVE | Facility: OTHER | Age: 62
End: 2022-03-01
Payer: MEDICARE

## 2022-03-01 NOTE — TELEPHONE ENCOUNTER
Pt escalated for no response. Spoke to pt who is on his way home from dialysis. Denies any new or worsening symptoms or SOB. He will get vitals submitted when he gets home. Pt has number to OOC for further concerns. Will continue to monitor.    Reason for Disposition   Health Information question, no triage required and triager able to answer question    Protocols used: INFORMATION ONLY CALL - NO TRIAGE-A-

## 2022-03-01 NOTE — TELEPHONE ENCOUNTER
Pt escalated for HR of 113. Pt states he had been outside feeding his dogs and had just come back inside when he went to check it. Pt denies any CP or SOB at rest. States SOB w/activity is slowly improving. Pt has a slight cough says he coughs about once or twice a day. Recheck on pulse ox SpO2 is 98 with HR of 102. All VS and symptoms WNL. Pt has number to OOC for further concerns. Will continue to monitor.    Reason for Disposition   Health Information question, no triage required and triager able to answer question    Protocols used: INFORMATION ONLY CALL - NO TRIAGE-A-

## 2022-03-02 ENCOUNTER — NURSE TRIAGE (OUTPATIENT)
Dept: ADMINISTRATIVE | Facility: CLINIC | Age: 62
End: 2022-03-02
Payer: MEDICARE

## 2022-03-02 ENCOUNTER — LAB VISIT (OUTPATIENT)
Dept: LAB | Facility: HOSPITAL | Age: 62
End: 2022-03-02
Attending: INTERNAL MEDICINE
Payer: MEDICARE

## 2022-03-02 ENCOUNTER — TELEPHONE (OUTPATIENT)
Dept: ADMINISTRATIVE | Facility: CLINIC | Age: 62
End: 2022-03-02
Payer: MEDICARE

## 2022-03-02 ENCOUNTER — PATIENT MESSAGE (OUTPATIENT)
Dept: ADMINISTRATIVE | Facility: OTHER | Age: 62
End: 2022-03-02
Payer: MEDICARE

## 2022-03-02 DIAGNOSIS — Z94.2 LUNG REPLACED BY TRANSPLANT: ICD-10-CM

## 2022-03-02 PROCEDURE — 36415 COLL VENOUS BLD VENIPUNCTURE: CPT | Mod: PO | Performed by: INTERNAL MEDICINE

## 2022-03-02 PROCEDURE — 80197 ASSAY OF TACROLIMUS: CPT | Performed by: INTERNAL MEDICINE

## 2022-03-02 NOTE — TELEPHONE ENCOUNTER
Pt escalated due to reporting 3/5 SOB with activity. Pt states that has he has been engaged in more activity today. Denies CP, states that nurse is suppose to coming later for home visit. Pt is lung transplant pt. Marielle Cowan PA-C aware,and will call pt.    Reason for Disposition   MILD difficulty breathing (e.g., minimal/no SOB at rest, SOB with walking, pulse <100)    Additional Information   Negative: SEVERE difficulty breathing (e.g., struggling for each breath, speaks in single words)   Negative: Difficult to awaken or acting confused (e.g., disoriented, slurred speech)   Negative: Bluish (or gray) lips or face now   Negative: Shock suspected (e.g., cold/pale/clammy skin, too weak to stand, low BP, rapid pulse)   Negative: Sounds like a life-threatening emergency to the triager   Negative: SEVERE or constant chest pain or pressure (Exception: mild central chest pain, present only when coughing)   Negative: MODERATE difficulty breathing (e.g., speaks in phrases, SOB even at rest, pulse 100-120)   Negative: Headache and stiff neck (can't touch chin to chest)   Negative: Chest pain or pressure   Negative: Patient sounds very sick or weak to the triager    Protocols used: CORONAVIRUS (COVID-19) DIAGNOSED OR [id number]

## 2022-03-02 NOTE — TELEPHONE ENCOUNTER
"Called patient due to RN escalation in COVID Surveillance program. Pt escalated due to dyspnea with activity.    Patient location: [place], [place]    Vitals: Sp02: 96% on 2 L. P: 93. Temp: unk  Ambulatory Sp02 on the phone (if applicable):    61 y.o. male with pertinent PMHx of covid-19, lung transplant, HTN, DM type II, ESRD, post-viral debility KRISTYN on CPAP, pulmonary HTN, sarcoidosis on day >14 of Covid symptoms. Positive Covid screen 2/11/22. CXR on 2/22. Home oxygen: yes (2 L). COVID-19 Hospitalization History: admitted 2/12-2/17. Received antibody infusion: no. Fully vaccinated: no (needs booster). Remdesivir treatment day: received IP. SpO2 goal on hospital discharge: >93%.    HPI: Pt escalated for reported dyspnea with activity. States he was doing more activity today and it "caught up with him". Was walking outside, cooking and felt like he had to sit down and catch his breath. Reports feeling like he has recovered now. SpO2 has remained >94% on 2 L. Blood sugars have been 135-166. He denies any other acute or worsening symptoms at this time.    ROS: Denies worsening cough, light headedness, fever, chills, diaphoresis, chest pain, abdominal pain, emesis, diarrhea or further symptoms.     Assessment: Vitals appear WNL. During phone call, patient appears alert and oriented. Able to speak in full sentences with some audible tachypnea noted. No audible wheezing heard during call.    Plan:    -Maintain SpO2 >93%  -Rest, deep breathing exercises  -Will reevaluate tomorrow AM    Reviewed with patient the reasons for seeking emergency care. Pt aware that if Sp02 <94% or if they have any worsening symptoms, they need to go to the emergency department. If they are having a medical emergency, they will call 911. Otherwise, patient will continue to submit data as scheduled. Reviewed importance of wearing mask if self or family members leave the house.     Advised next steps: Continue care at home  "

## 2022-03-03 ENCOUNTER — NURSE TRIAGE (OUTPATIENT)
Dept: ADMINISTRATIVE | Facility: CLINIC | Age: 62
End: 2022-03-03
Payer: MEDICARE

## 2022-03-03 ENCOUNTER — PATIENT MESSAGE (OUTPATIENT)
Dept: ADMINISTRATIVE | Facility: CLINIC | Age: 62
End: 2022-03-03
Payer: MEDICARE

## 2022-03-03 DIAGNOSIS — Z94.2 LUNG REPLACED BY TRANSPLANT: ICD-10-CM

## 2022-03-03 LAB — TACROLIMUS BLD-MCNC: <2 NG/ML (ref 5–15)

## 2022-03-03 RX ORDER — TACROLIMUS 0.5 MG/1
1.5 CAPSULE ORAL EVERY 12 HOURS
Qty: 180 CAPSULE | Refills: 11 | Status: SHIPPED | OUTPATIENT
Start: 2022-03-03 | End: 2023-01-01 | Stop reason: SDUPTHER

## 2022-03-03 NOTE — TELEPHONE ENCOUNTER
Pt no contact for no response and last day in the program will sent my chart and will wait to see if places v/s  Reason for Disposition   Caller has cancelled the call before the first contact    Protocols used: NO CONTACT OR DUPLICATE CONTACT CALL-A-[place]

## 2022-03-03 NOTE — TELEPHONE ENCOUNTER
"Called patient to discuss the medication plan of care. The patient repeated correctly that he should increase his tacrolimus (Prograf) dose to 1 .5 mg (three of the 0.5mg capsules) twice a day, and that he should start this new Prograf tonight. Informed patient that repeat lab work will be done as scheduled when he comes to clinic on Monday,3/7/22. The patient verbalized his understanding. When asked, the patient declined the need for me to call his daughter re: the medication change. He added that his daughter returned to her home and he has been managing his own medications.   The patient shared that his nephrologist assessed him earlier today during his hemodialysis treatment and said "my right lung sounds diminished." Explained that Dr. Mcdonald will review the chest x-ray and assess his lungs during his clinic visit on 3/7. The patient verbalized his understanding and all questions at this time were addressed to his satisfaction.         ----- Message from Robina Mcdonald MD sent at 3/3/2022 10:27 AM CST -----  Increase tacrolimus to 1.5 mg BID    "

## 2022-03-04 ENCOUNTER — DOCUMENTATION ONLY (OUTPATIENT)
Dept: TRANSPLANT | Facility: CLINIC | Age: 62
End: 2022-03-04
Payer: MEDICARE

## 2022-03-07 ENCOUNTER — HOSPITAL ENCOUNTER (OUTPATIENT)
Dept: PULMONOLOGY | Facility: CLINIC | Age: 62
Discharge: HOME | End: 2022-03-07
Payer: MEDICARE

## 2022-03-07 ENCOUNTER — OFFICE VISIT (OUTPATIENT)
Dept: TRANSPLANT | Facility: CLINIC | Age: 62
End: 2022-03-07
Payer: MEDICARE

## 2022-03-07 ENCOUNTER — HOSPITAL ENCOUNTER (OUTPATIENT)
Dept: RADIOLOGY | Facility: HOSPITAL | Age: 62
Discharge: HOME | End: 2022-03-07
Attending: INTERNAL MEDICINE
Payer: MEDICARE

## 2022-03-07 ENCOUNTER — TELEPHONE (OUTPATIENT)
Dept: TRANSPLANT | Facility: CLINIC | Age: 62
End: 2022-03-07

## 2022-03-07 VITALS
HEART RATE: 83 BPM | OXYGEN SATURATION: 100 % | TEMPERATURE: 97 F | BODY MASS INDEX: 30.62 KG/M2 | RESPIRATION RATE: 20 BRPM | DIASTOLIC BLOOD PRESSURE: 73 MMHG | SYSTOLIC BLOOD PRESSURE: 142 MMHG | HEIGHT: 70 IN | WEIGHT: 213.88 LBS

## 2022-03-07 VITALS — HEIGHT: 70 IN | WEIGHT: 213.88 LBS | BODY MASS INDEX: 30.62 KG/M2

## 2022-03-07 DIAGNOSIS — Z79.2 PROPHYLACTIC ANTIBIOTIC: ICD-10-CM

## 2022-03-07 DIAGNOSIS — J96.11 CHRONIC RESPIRATORY FAILURE WITH HYPOXIA AND HYPERCAPNIA: ICD-10-CM

## 2022-03-07 DIAGNOSIS — D84.9 IMMUNOSUPPRESSION: ICD-10-CM

## 2022-03-07 DIAGNOSIS — Z94.2 LUNG REPLACED BY TRANSPLANT: ICD-10-CM

## 2022-03-07 DIAGNOSIS — J96.12 CHRONIC RESPIRATORY FAILURE WITH HYPOXIA AND HYPERCAPNIA: ICD-10-CM

## 2022-03-07 DIAGNOSIS — D51.3 OTHER DIETARY VITAMIN B12 DEFICIENCY ANEMIA: ICD-10-CM

## 2022-03-07 DIAGNOSIS — D52.0 DIETARY FOLATE DEFICIENCY ANEMIA: ICD-10-CM

## 2022-03-07 DIAGNOSIS — D64.9 ANEMIA, UNSPECIFIED TYPE: ICD-10-CM

## 2022-03-07 LAB
ALLENS TEST: ABNORMAL
DELSYS: ABNORMAL
FEF 25 75 LLN: 1.5
FEF 25 75 PRE REF: 12.8 %
FEF 25 75 REF: 3.04
FEV05 LLN: 1.69
FEV05 REF: 2.83
FEV1 FVC LLN: 66
FEV1 FVC PRE REF: 79.9 %
FEV1 FVC REF: 78
FEV1 LLN: 2.74
FEV1 PRE REF: 23.2 %
FEV1 REF: 3.62
FIO2: 0.28
FLOW: 2
FVC LLN: 3.57
FVC PRE REF: 29 %
FVC REF: 4.67
HCO3 UR-SCNC: 30.3 MMOL/L (ref 24–28)
MODE: ABNORMAL
PCO2 BLDA: 48.3 MMHG (ref 35–45)
PEF LLN: 6.98
PEF PRE REF: 38.2 %
PEF REF: 9.3
PH SMN: 7.41 [PH] (ref 7.35–7.45)
PHYSICIAN COMMENT: ABNORMAL
PO2 BLDA: 113 MMHG (ref 80–100)
POC BE: 6 MMOL/L
POC SATURATED O2: 98 % (ref 95–100)
POC TCO2: 32 MMOL/L (ref 23–27)
PRE FEF 25 75: 0.39 L/S (ref 1.5–5.1)
PRE FET 100: 6.1 SEC
PRE FEV05 REF: 23.1 %
PRE FEV1 FVC: 61.98 % (ref 65.7–87.95)
PRE FEV1: 0.84 L (ref 2.74–4.45)
PRE FEV5: 0.65 L (ref 1.69–3.96)
PRE FVC: 1.35 L (ref 3.57–5.79)
PRE PEF: 3.55 L/S (ref 6.98–11.61)
SAMPLE: ABNORMAL
SITE: ABNORMAL

## 2022-03-07 PROCEDURE — 82803 BLOOD GASES ANY COMBINATION: CPT | Mod: S$GLB,,, | Performed by: INTERNAL MEDICINE

## 2022-03-07 PROCEDURE — 3072F PR LOW RISK FOR RETINOPATHY: ICD-10-PCS | Mod: CPTII,S$GLB,, | Performed by: INTERNAL MEDICINE

## 2022-03-07 PROCEDURE — 94618 PULMONARY STRESS TESTING: CPT | Mod: S$GLB,,, | Performed by: INTERNAL MEDICINE

## 2022-03-07 PROCEDURE — 4010F ACE/ARB THERAPY RXD/TAKEN: CPT | Mod: CPTII,S$GLB,, | Performed by: INTERNAL MEDICINE

## 2022-03-07 PROCEDURE — 3077F SYST BP >= 140 MM HG: CPT | Mod: CPTII,S$GLB,, | Performed by: INTERNAL MEDICINE

## 2022-03-07 PROCEDURE — 99214 PR OFFICE/OUTPT VISIT, EST, LEVL IV, 30-39 MIN: ICD-10-PCS | Mod: 25,S$GLB,, | Performed by: INTERNAL MEDICINE

## 2022-03-07 PROCEDURE — 4010F PR ACE/ARB THEARPY RXD/TAKEN: ICD-10-PCS | Mod: CPTII,S$GLB,, | Performed by: INTERNAL MEDICINE

## 2022-03-07 PROCEDURE — 36600 PR WITHDRAWAL OF ARTERIAL BLOOD: ICD-10-PCS | Mod: S$GLB,,, | Performed by: INTERNAL MEDICINE

## 2022-03-07 PROCEDURE — 3077F PR MOST RECENT SYSTOLIC BLOOD PRESSURE >= 140 MM HG: ICD-10-PCS | Mod: CPTII,S$GLB,, | Performed by: INTERNAL MEDICINE

## 2022-03-07 PROCEDURE — 99999 PR PBB SHADOW E&M-EST. PATIENT-LVL III: CPT | Mod: PBBFAC,,, | Performed by: INTERNAL MEDICINE

## 2022-03-07 PROCEDURE — 99999 PR PBB SHADOW E&M-EST. PATIENT-LVL III: ICD-10-PCS | Mod: PBBFAC,,, | Performed by: INTERNAL MEDICINE

## 2022-03-07 PROCEDURE — 71046 X-RAY EXAM CHEST 2 VIEWS: CPT | Mod: 26,,, | Performed by: RADIOLOGY

## 2022-03-07 PROCEDURE — 36600 WITHDRAWAL OF ARTERIAL BLOOD: CPT | Mod: S$GLB,,, | Performed by: INTERNAL MEDICINE

## 2022-03-07 PROCEDURE — 3078F DIAST BP <80 MM HG: CPT | Mod: CPTII,S$GLB,, | Performed by: INTERNAL MEDICINE

## 2022-03-07 PROCEDURE — 1159F PR MEDICATION LIST DOCUMENTED IN MEDICAL RECORD: ICD-10-PCS | Mod: CPTII,S$GLB,, | Performed by: INTERNAL MEDICINE

## 2022-03-07 PROCEDURE — 3078F PR MOST RECENT DIASTOLIC BLOOD PRESSURE < 80 MM HG: ICD-10-PCS | Mod: CPTII,S$GLB,, | Performed by: INTERNAL MEDICINE

## 2022-03-07 PROCEDURE — 71046 XR CHEST PA AND LATERAL: ICD-10-PCS | Mod: 26,,, | Performed by: RADIOLOGY

## 2022-03-07 PROCEDURE — 94010 BREATHING CAPACITY TEST: CPT | Mod: S$GLB,,, | Performed by: INTERNAL MEDICINE

## 2022-03-07 PROCEDURE — 3072F LOW RISK FOR RETINOPATHY: CPT | Mod: CPTII,S$GLB,, | Performed by: INTERNAL MEDICINE

## 2022-03-07 PROCEDURE — 3008F PR BODY MASS INDEX (BMI) DOCUMENTED: ICD-10-PCS | Mod: CPTII,S$GLB,, | Performed by: INTERNAL MEDICINE

## 2022-03-07 PROCEDURE — 99499 RISK ADDL DX/OHS AUDIT: ICD-10-PCS | Mod: S$GLB,,,

## 2022-03-07 PROCEDURE — 94010 BREATHING CAPACITY TEST: ICD-10-PCS | Mod: S$GLB,,, | Performed by: INTERNAL MEDICINE

## 2022-03-07 PROCEDURE — 82803 PR  BLOOD GASES: PH, PO2 & PCO2: ICD-10-PCS | Mod: S$GLB,,, | Performed by: INTERNAL MEDICINE

## 2022-03-07 PROCEDURE — 94618 PULMONARY STRESS TESTING: ICD-10-PCS | Mod: S$GLB,,, | Performed by: INTERNAL MEDICINE

## 2022-03-07 PROCEDURE — 99214 OFFICE O/P EST MOD 30 MIN: CPT | Mod: 25,S$GLB,, | Performed by: INTERNAL MEDICINE

## 2022-03-07 PROCEDURE — 1111F DSCHRG MED/CURRENT MED MERGE: CPT | Mod: CPTII,S$GLB,, | Performed by: INTERNAL MEDICINE

## 2022-03-07 PROCEDURE — 1111F PR DISCHARGE MEDS RECONCILED W/ CURRENT OUTPATIENT MED LIST: ICD-10-PCS | Mod: CPTII,S$GLB,, | Performed by: INTERNAL MEDICINE

## 2022-03-07 PROCEDURE — 1159F MED LIST DOCD IN RCRD: CPT | Mod: CPTII,S$GLB,, | Performed by: INTERNAL MEDICINE

## 2022-03-07 PROCEDURE — 71046 X-RAY EXAM CHEST 2 VIEWS: CPT | Mod: TC

## 2022-03-07 PROCEDURE — 99499 UNLISTED E&M SERVICE: CPT | Mod: S$GLB,,,

## 2022-03-07 PROCEDURE — 3008F BODY MASS INDEX DOCD: CPT | Mod: CPTII,S$GLB,, | Performed by: INTERNAL MEDICINE

## 2022-03-07 NOTE — PROGRESS NOTES
LUNG TRANSPLANT RECIPIENT FOLLOW-UP     Reason for Visit: Follow-up after lung transplantation.                               Date of Transplant: 8/22/17     Reason for Transplant: Sarcoidosis with pulmonary hypertension     Type of Transplant: bilateral sequential lung     CMV Status: D+ / R-      Major Complications:   1. Left vocal cord dysfunction   2. 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  A1- 1/2021  6.  Increasing DSA noted in 02/2021- s/p tx in 04/2021 with PLEX/IVIG/MP and ritux   7. 2/2022 COVID infection with residual functional decline                                                                               History of Present Illness: Martin Hendrix Jr. is a 60 y.o. year old male with the above stated history.  He is on 2L of oxygen. She is on no assisted ventilation although he has been diagnosed with KRISTYN and owns a CPAP.  His New York Heart Association Class is III and a Karnofsky score of 70% - Cares for self: Unable to carry on normal activity or active work.   He is diabetic insulin dependent.    Patient was recently hospitalized with COVID. In the hospital, he did not have any severe respiratory complications such as increase O2 requirements, intubation, etc. He had an blood gas in the hospital that showed improvement in his pH and CO2 from his numbers in November. He states that since his admission, he has not recovered to his pre-COVID baseline. He states that before his infection, he would be comfortable at rest without supplemental O2. Now, he requires 2L at all times. He is dyspneic with minimal exertion. He states that at home, he is able to monitor his O2 sats and at rest he is in the high 90s on 2L. With exertion he drops into the mid 80s. He states that he becomes symptomatically dyspneic around 88%. He has not been increasing  "his O2 flow rate with expected exertion. Continues to take his immunosuppression medications. His tacro level was elevated inpatient and his dose was dropped from 6 BID to 2 BID. His recent level was <2 so his dose was increased up to 3 BID. He has KRISTYN and a home CPAP but does not use it because the mask is uncomfortable.    Review of Systems   Constitutional: Negative for chills, diaphoresis, fever, malaise/fatigue and weight loss.   HENT: Negative for congestion, ear discharge, ear pain, hearing loss, nosebleeds, sinus pain, sore throat and tinnitus.    Eyes: Negative for blurred vision, double vision, photophobia, pain, discharge and redness.   Respiratory: Positive for shortness of breath (with exertion). Negative for cough, hemoptysis, sputum production, wheezing and stridor.    Cardiovascular: Negative for chest pain, palpitations, orthopnea, claudication, leg swelling and PND.   Gastrointestinal: Negative for abdominal pain, blood in stool, constipation, diarrhea, heartburn, melena, nausea and vomiting.   Genitourinary: Negative for dysuria, flank pain, frequency, hematuria and urgency.        Decreased urination since kidney failure  Musculoskeletal: Negative for back pain, falls, joint pain, myalgias and neck pain.   Skin: Negative for itching and rash.   Neurological: Negative for dizziness (notes occasional lightheadedness after HD), tingling, tremors, sensory change, speech change, focal weakness, seizures, loss of consciousness, weakness and headaches.   Endo/Heme/Allergies: Negative for environmental allergies and polydipsia. Does not bruise/bleed easily.   Psychiatric/Behavioral: Negative for depression, hallucinations, memory loss, substance abuse and suicidal ideas. The patient is not nervous/anxious and does not have insomnia.      BP (!) 142/73   Pulse 83   Temp 97.1 °F (36.2 °C) (Oral)   Resp 20   Ht 5' 10" (1.778 m)   Wt 97 kg (213 lb 13.5 oz)   SpO2 100% Comment: 2 liters  BMI 30.68 " kg/m²     Physical Exam  Vitals and nursing note reviewed.   Constitutional:       General: He is not in acute distress.     Appearance: He is not ill-appearing or diaphoretic.   HENT:      Head: Normocephalic and atraumatic.      Nose: Nose normal.      Mouth/Throat:      Pharynx: No oropharyngeal exudate.   Eyes:      General: No scleral icterus.     Extraocular Movements: Extraocular movements intact.      Conjunctiva/sclera: Conjunctivae normal.   Neck:      Thyroid: No thyromegaly.      Vascular: No JVD.      Trachea: No tracheal deviation.   Cardiovascular:      Rate and Rhythm: Normal rate and regular rhythm.      Heart sounds: Murmur (2/6 systolic murmur best heard at LUSB) heard.     Pulmonary:      Effort: Pulmonary effort is normal. No respiratory distress.      Breath sounds: No stridor.  No wheezing or rhonchi. Mild rales at the bases.  Chest:      Chest wall: No tenderness.   Abdominal:      General: Bowel sounds are normal. There is no distension.      Palpations: Abdomen is soft. There is no mass.      Tenderness: There is no abdominal tenderness. There is no guarding or rebound.   Musculoskeletal:         General: No tenderness or deformity. Normal range of motion.      Cervical back: Normal range of motion and neck supple.   Lymphadenopathy:      Cervical: No cervical adenopathy.   Skin:     General: Skin is warm and dry.      Coloration: Skin is not pale.      Findings: No erythema or rash.   Neurological:      Mental Status: He is alert and oriented to person, place, and time.      Cranial Nerves: No cranial nerve deficit.      Coordination: Coordination normal.      Gait: Gait is intact.   Psychiatric:         Mood and Affect: Mood and affect normal.         Cognition and Memory: Memory normal.         Judgment: Judgment normal.       Labs:  cbc, cmp, tacrolimus Latest Ref Rng & Units 2/22/2022 3/2/2022 3/7/2022   TACROLIMUS LVL 5.0 - 15.0 ng/mL 8.9 <2.0(L) -   WHITE BLOOD CELL COUNT 3.90 -  12.70 K/uL - - 5.78   RBC 4.60 - 6.20 M/uL - - 2.35(L)   HEMOGLOBIN 14.0 - 18.0 g/dL - - 7.1(L)   HEMATOCRIT 40.0 - 54.0 % - - 24.0(L)   MCV 82 - 98 fL - - 102(H)   MCH 27.0 - 31.0 pg - - 30.2   MCHC 32.0 - 36.0 g/dL - - 29.6(L)   RDW 11.5 - 14.5 % - - 13.2   PLATELETS 150 - 450 K/uL - - 372   MPV 9.2 - 12.9 fL - - 9.0(L)   GRAN # 1.8 - 7.7 K/uL - - 3.3   LYMPH # 1.0 - 4.8 K/uL - - 1.2   MONO # 0.3 - 1.0 K/uL - - 0.8   EOSINOPHIL% 0.0 - 8.0 % - - 6.4   BASOPHIL% 0.0 - 1.9 % - - 0.9   DIFFERENTIAL METHOD - - - Automated   SODIUM 136 - 145 mmol/L 137 - 142   POTASSIUM 3.5 - 5.1 mmol/L 4.7 - 5.5(H)   CHLORIDE 95 - 110 mmol/L 95 - 99   CO2 23 - 29 mmol/L 30(H) - 29   GLUCOSE 70 - 110 mg/dL 205(H) - 102   BUN BLD 6 - 20 mg/dL 46(H) - 41(H)   CREATININE 0.5 - 1.4 mg/dL 8.4(H) - 9.3(H)   CALCIUM 8.7 - 10.5 mg/dL 9.1 - 8.3(L)   PROTEIN TOTAL 6.0 - 8.4 g/dL 6.3 - 6.6   ALBUMIN 3.5 - 5.2 g/dL 2.6(L) - 3.2(L)   BILIRUBIN TOTAL 0.1 - 1.0 mg/dL 0.4 - 0.5   ALK PHOS 55 - 135 U/L 77 - 75   AST 10 - 40 U/L 19 - 11   ALT 10 - 44 U/L 28 - 11   ANION GAP 8 - 16 mmol/L 12 - 14   EGFR IF AFRICAN AMERICAN >60 mL/min/1.73 m:2 7.2(A) - 6.4(A)   EGFR IF NON-AFRICAN AMERICAN >60 mL/min/1.73 m:2 6.3(A) - 5.5(A)     Pulmonary Function Tests 3/7/2022 1/12/2022 12/8/2021 11/8/2021 10/6/2021 9/20/2021 8/16/2021   FVC 1.35 1.65 1.66 1.65 1.66 1.87 1.79   FEV1 0.84 1.06 1.09 1.06 1.12 1.26 1.27   TLC (liters) - - - - - - -   DLCO (ml/mmHg sec) - - - - - - -   FVC% 29 35.2 35.4 35.2 35.3 39.9 38.2   FEV1% 23.2 29.3 30.2 29.3 30.9 34.7 34.8   FEF 25-75 0.39 0.54 0.58 0.53 0.62 0.68 0.81   FEF 25-75% 12.8 17.6 19 17.2 20.4 22.2 26.4   TLC% - - - - - - -   RV - - - - - - -   RV% - - - - - - -   DLCO% - - - - - - -         Imaging:    CT CHEST 11/08/21  1. Worsening patchy ground-glass attenuation and consolidation within the posterior right middle lobe.  Interval resolution of multiple bilateral solid and sub solid opacities.  Waxing and waning imaging  characteristics suggest sequela of an infectious or inflammatory process.  Continued follow-up recommended.  2. Stable subsegmental atelectasis within the lingula and bilateral lower lobes.  3. Additional stable findings as detailed in the body of the report.    Results for orders placed or performed during the hospital encounter of 02/12/22 (from the past 2160 hour(s))   CT Chest Without Contrast    Impression    Postoperative changes of bilateral lung transplants.    Bilateral multifocal ground-glass opacities, consistent with positive COVID infection.  Some lobulated consolidative component at the right base not excluded.    Nodular soft tissue density projecting along the anterior aspect of the left mainstem bronchus series 302, image 218.    Additional findings as above.    Electronically signed by resident: Alessandro Luke  Date:    02/13/2022  Time:    09:31    Electronically signed by: Bebo Heredia MD  Date:    02/13/2022  Time:    10:28     *Note: Due to a large number of results and/or encounters for the requested time period, some results have not been displayed. A complete set of results can be found in Results Review.     Results for orders placed during the hospital encounter of 03/07/22    X-Ray Chest PA And Lateral    Narrative  EXAMINATION:  XR CHEST PA AND LATERAL    CLINICAL HISTORY:  BSLT 8/22/2017; Lung transplant status    TECHNIQUE:  PA and lateral views of the chest were performed.    COMPARISON:  02/22/2022    FINDINGS:  Sternotomy.Lungs are unchanged.Pleural reaction both costophrenic angles.  Bibasilar consolidative atelectasis.No pneumothorax.    The cardiac silhouette is unchanged.  Hilar mediastinal structures unchanged.    Bones are intact.    Impression  No acute abnormality.No interval change from 02/22/2022      Electronically signed by: Florentin Andrade MD  Date:    03/07/2022  Time:    07:54    Recent Labs     03/07/22  0849   PH 7.405   PCO2 48.3*   PO2 113*   HCO3 30.3*    POCSATURATED 98   BE 6       Assessment:  1. Lung replaced by transplant     2. Immunosuppression     3. Prophylactic antibiotic     4. Chronic respiratory failure with hypoxia and hypercapnia          Plan:     1. FEV1 decreased by 20% from his prior after his COVID infection, which is the most likely culprit. Will obtain 6WMT to assess for his new exertional supplemental O2 requirement. Close follow up to determine his recovery potential. Currently receiving PT, after PT will restart pulmonary rehab. Hypercapnea improved from prior.    2. Continue to dose tacrolimus based on levels, everolimus 0.75 mg daily, prednisone 5 mg daily. 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 Continue azithromycin MWF.     3. Continue bactrim SS.     4. Anemia - hemoglobin dropped from 10 to 7. Will obtain full work up with folate, B12, iron studies, retic count, TSH.     5. Continue HD TThS.     6. Continue diabetes regimen per endocrinology.     7. Instructed to use oxygen as prescribed. Patient will work with his sleep provider to find a CPAP mask that works for him.    8. Continue PPI.     9. RTC in 1 month or sooner if needed.       Horacio Rahman MD  PCCM PGY5

## 2022-03-07 NOTE — TELEPHONE ENCOUNTER
Orders for lab work to be done next week noted in Dr. Mcdonald's clinic encounter with the patient today.  Called patient to discuss the plan of care. He agreed with scheduling a non-fasting lab appointment at the [facility] location on Monday, 3/14/21 at 0825. Also informed him that he will be rescheduled to RTC in 4 weeks per Dr. Mcdonald's plan. Instructed him to notify our team when he completed home health PT so that a referral to outpatient pulmonary rehab can be made. The patient verbalized his understanding of all information discussed and denied having any questions at this time.

## 2022-03-08 ENCOUNTER — TELEPHONE (OUTPATIENT)
Dept: TRANSPLANT | Facility: CLINIC | Age: 62
End: 2022-03-08
Payer: MEDICARE

## 2022-03-08 ENCOUNTER — EXTERNAL HOME HEALTH (OUTPATIENT)
Dept: HOME HEALTH SERVICES | Facility: HOSPITAL | Age: 62
End: 2022-03-08
Payer: MEDICARE

## 2022-03-08 NOTE — TELEPHONE ENCOUNTER
Reviewed U/A results with Dr. Mcdonald. Received an order from her to discontinue any future U/A and urine protein/creatinine ratio testing per mTOR department guidelines since the patient has ESRD and requires hemodialysis treatments.

## 2022-03-09 NOTE — PROCEDURES
Martin Hendrix Jr. is a 61 y.o.  male patient, who presents for a 6 minute walk test ordered by MD Josiah.  The diagnosis is Qualify for Oxygen.  The patient's BMI is 30.7kg/m2. Predicted distance (lower limit of normal) is 405.31 meters.    Test Results:    The test was not completed.  The patient stopped 1 times for a total of 264 seconds.  The total time walked was 96 seconds.  During walking, the patient reported:  Dyspnea, Lightheadedness. The patient used no assistive devices during testing.     3/7/2022--------Distance: 76.2 meters (250 feet)     O2 Sat % Supplemental Oxygen Heart Rate Blood Pressure Eugenio Scale   Pre-exercise  (Resting) 97 % Room Air 93 bpm 136/73 0   During Exercise 76 % Room Air 107 bpm 142/69 4   Post-exercise   92 % Room Air  96 bpm         Recovery Time: 251 seconds    Oxygen Qualification:     O2 Sat % Supplemental Oxygen Heart Rate Blood Pressure Eugenio Scale   Pre-exercise  (Resting) 100 % 2 L/M  92 bpm  142/73  0    During Exercise 96 %  2 L/M  97 bpm  131/62  3    Post-exercise   96 %  2 L/M  96 bpm            Recovery Time: 58 seconds    Performing nurse/tech: Sandy GAGE    PREVIOUS STUDY:   The patient had a previous study.     09/20/2021---------Distance: 365.76 meters (1200 feet)       O2 Sat % Supplemental Oxygen Heart Rate Blood Pressure Eugenio Scale   Pre-exercise  (Resting) 98 % Room Air 91 bpm 146/84 mmHg 2   During Exercise 81 % Room Air 112 bpm 186/88 mmHg 3   Post-exercise    97 % Room Air  101 bpm 177/84 mmHg          CLINICAL INTERPRETATION:  Six minute walk distance is 76.2 meters (250 feet) with somewhat heavy dyspnea.  During exercise, there was significant desaturation while breathing room air.  Both blood pressure and heart rate remained stable with walking.  The patient reported non-pulmonary symptoms during exercise.  Severe exercise impairment is likely due to desaturation.  The patient did not complete the study, walking 96 seconds of the 360 second  test.  The patient may benefit from using supplemental oxygen during exertion.  Since the previous study in September 2021, exercise capacity is significantly worse.  Based upon age and body mass index, exercise capacity is less than predicted.   Oxygen saturation did improve while breathing supplemental oxygen.

## 2022-03-10 DIAGNOSIS — D84.9 IMMUNOSUPPRESSION: ICD-10-CM

## 2022-03-10 DIAGNOSIS — Z94.2 LUNG REPLACED BY TRANSPLANT: Primary | ICD-10-CM

## 2022-03-14 ENCOUNTER — LAB VISIT (OUTPATIENT)
Dept: LAB | Facility: HOSPITAL | Age: 62
End: 2022-03-14
Attending: FAMILY MEDICINE
Payer: MEDICARE

## 2022-03-14 DIAGNOSIS — E11.22 CKD STAGE 4 DUE TO TYPE 2 DIABETES MELLITUS: ICD-10-CM

## 2022-03-14 DIAGNOSIS — Z00.01 ENCOUNTER FOR GENERAL ADULT MEDICAL EXAMINATION WITH ABNORMAL FINDINGS: ICD-10-CM

## 2022-03-14 DIAGNOSIS — Z94.2 LUNG REPLACED BY TRANSPLANT: ICD-10-CM

## 2022-03-14 DIAGNOSIS — D51.3 OTHER DIETARY VITAMIN B12 DEFICIENCY ANEMIA: ICD-10-CM

## 2022-03-14 DIAGNOSIS — Z13.220 ENCOUNTER FOR LIPID SCREENING FOR CARDIOVASCULAR DISEASE: ICD-10-CM

## 2022-03-14 DIAGNOSIS — E11.65 TYPE 2 DIABETES MELLITUS WITH HYPERGLYCEMIA, WITH LONG-TERM CURRENT USE OF INSULIN: ICD-10-CM

## 2022-03-14 DIAGNOSIS — Z79.4 TYPE 2 DIABETES MELLITUS WITH HYPERGLYCEMIA, WITH LONG-TERM CURRENT USE OF INSULIN: ICD-10-CM

## 2022-03-14 DIAGNOSIS — Z13.6 ENCOUNTER FOR LIPID SCREENING FOR CARDIOVASCULAR DISEASE: ICD-10-CM

## 2022-03-14 DIAGNOSIS — E78.2 COMBINED HYPERLIPIDEMIA ASSOCIATED WITH TYPE 2 DIABETES MELLITUS: ICD-10-CM

## 2022-03-14 DIAGNOSIS — E11.59 HYPERTENSION ASSOCIATED WITH DIABETES: ICD-10-CM

## 2022-03-14 DIAGNOSIS — N18.4 CKD STAGE 4 DUE TO TYPE 2 DIABETES MELLITUS: ICD-10-CM

## 2022-03-14 DIAGNOSIS — D64.9 ANEMIA, UNSPECIFIED TYPE: ICD-10-CM

## 2022-03-14 DIAGNOSIS — E11.69 COMBINED HYPERLIPIDEMIA ASSOCIATED WITH TYPE 2 DIABETES MELLITUS: ICD-10-CM

## 2022-03-14 DIAGNOSIS — Z79.899 ENCOUNTER FOR LONG-TERM (CURRENT) USE OF MEDICATIONS: ICD-10-CM

## 2022-03-14 DIAGNOSIS — I15.2 HYPERTENSION ASSOCIATED WITH DIABETES: ICD-10-CM

## 2022-03-14 DIAGNOSIS — D52.0 DIETARY FOLATE DEFICIENCY ANEMIA: ICD-10-CM

## 2022-03-14 LAB
BASOPHILS # BLD AUTO: 0.09 K/UL (ref 0–0.2)
BASOPHILS NFR BLD: 1.3 % (ref 0–1.9)
DIFFERENTIAL METHOD: ABNORMAL
EOSINOPHIL # BLD AUTO: 0.8 K/UL (ref 0–0.5)
EOSINOPHIL NFR BLD: 10.9 % (ref 0–8)
ERYTHROCYTE [DISTWIDTH] IN BLOOD BY AUTOMATED COUNT: 14.5 % (ref 11.5–14.5)
ESTIMATED AVG GLUCOSE: 160 MG/DL (ref 68–131)
FERRITIN SERPL-MCNC: 1619 NG/ML (ref 20–300)
FOLATE SERPL-MCNC: 11 NG/ML (ref 4–24)
HBA1C MFR BLD: 7.2 % (ref 4–5.6)
HCT VFR BLD AUTO: 28.4 % (ref 40–54)
HGB BLD-MCNC: 8.3 G/DL (ref 14–18)
IMM GRANULOCYTES # BLD AUTO: 0.12 K/UL (ref 0–0.04)
IMM GRANULOCYTES NFR BLD AUTO: 1.7 % (ref 0–0.5)
IRON SERPL-MCNC: 57 UG/DL (ref 45–160)
LYMPHOCYTES # BLD AUTO: 1.9 K/UL (ref 1–4.8)
LYMPHOCYTES NFR BLD: 28 % (ref 18–48)
MCH RBC QN AUTO: 31 PG (ref 27–31)
MCHC RBC AUTO-ENTMCNC: 29.2 G/DL (ref 32–36)
MCV RBC AUTO: 106 FL (ref 82–98)
MONOCYTES # BLD AUTO: 1 K/UL (ref 0.3–1)
MONOCYTES NFR BLD: 14.4 % (ref 4–15)
NEUTROPHILS # BLD AUTO: 3 K/UL (ref 1.8–7.7)
NEUTROPHILS NFR BLD: 43.7 % (ref 38–73)
NRBC BLD-RTO: 1 /100 WBC
PLATELET # BLD AUTO: 644 K/UL (ref 150–450)
PMV BLD AUTO: 8.8 FL (ref 9.2–12.9)
RBC # BLD AUTO: 2.68 M/UL (ref 4.6–6.2)
RETICS/RBC NFR AUTO: 6.5 % (ref 0.4–2)
SATURATED IRON: 17 % (ref 20–50)
TOTAL IRON BINDING CAPACITY: 332 UG/DL (ref 250–450)
TRANSFERRIN SERPL-MCNC: 224 MG/DL (ref 200–375)
TRANSFERRIN SERPL-MCNC: 224 MG/DL (ref 200–375)
TSH SERPL DL<=0.005 MIU/L-ACNC: 1.34 UIU/ML (ref 0.4–4)
VIT B12 SERPL-MCNC: 1006 PG/ML (ref 210–950)
WBC # BLD AUTO: 6.89 K/UL (ref 3.9–12.7)

## 2022-03-14 PROCEDURE — 82746 ASSAY OF FOLIC ACID SERUM: CPT

## 2022-03-14 PROCEDURE — 84466 ASSAY OF TRANSFERRIN: CPT

## 2022-03-14 PROCEDURE — 85045 AUTOMATED RETICULOCYTE COUNT: CPT

## 2022-03-14 PROCEDURE — 36415 COLL VENOUS BLD VENIPUNCTURE: CPT | Mod: PO

## 2022-03-14 PROCEDURE — 84443 ASSAY THYROID STIM HORMONE: CPT

## 2022-03-14 PROCEDURE — 82728 ASSAY OF FERRITIN: CPT

## 2022-03-14 PROCEDURE — 83036 HEMOGLOBIN GLYCOSYLATED A1C: CPT | Performed by: FAMILY MEDICINE

## 2022-03-14 PROCEDURE — 82607 VITAMIN B-12: CPT

## 2022-03-14 PROCEDURE — 85025 COMPLETE CBC W/AUTO DIFF WBC: CPT | Performed by: INTERNAL MEDICINE

## 2022-03-15 ENCOUNTER — PATIENT MESSAGE (OUTPATIENT)
Dept: TRANSPLANT | Facility: CLINIC | Age: 62
End: 2022-03-15
Payer: MEDICARE

## 2022-03-15 ENCOUNTER — TELEPHONE (OUTPATIENT)
Dept: TRANSPLANT | Facility: CLINIC | Age: 62
End: 2022-03-15
Payer: MEDICARE

## 2022-03-15 NOTE — TELEPHONE ENCOUNTER
TONY for patient and asked him to return call.  ----- Message from Jarvis Vazquez sent at 3/15/2022 10:47 AM CDT -----  Regarding: call back  Pt call to speak with Sybil in regards to his injection requesting call back      Call

## 2022-03-16 ENCOUNTER — INFUSION (OUTPATIENT)
Dept: INFUSION THERAPY | Facility: HOSPITAL | Age: 62
End: 2022-03-16
Payer: MEDICARE

## 2022-03-16 VITALS
HEART RATE: 94 BPM | DIASTOLIC BLOOD PRESSURE: 70 MMHG | SYSTOLIC BLOOD PRESSURE: 146 MMHG | TEMPERATURE: 99 F | RESPIRATION RATE: 20 BRPM | OXYGEN SATURATION: 95 %

## 2022-03-16 DIAGNOSIS — T86.819 COMPLICATION OF TRANSPLANTED LUNG: ICD-10-CM

## 2022-03-16 DIAGNOSIS — Z94.2 LUNG REPLACED BY TRANSPLANT: Primary | ICD-10-CM

## 2022-03-16 DIAGNOSIS — D84.9 IMMUNOSUPPRESSION: ICD-10-CM

## 2022-03-16 PROCEDURE — 63600175 PHARM REV CODE 636 W HCPCS: Performed by: INTERNAL MEDICINE

## 2022-03-16 PROCEDURE — M0220 HC INJECTION ADMIN, TIXAGEVIMAB-CILGAVIMAB, INCL POST ADMIN MONIT: HCPCS | Performed by: INTERNAL MEDICINE

## 2022-03-16 RX ORDER — ONDANSETRON 4 MG/1
4 TABLET, ORALLY DISINTEGRATING ORAL ONCE
Status: CANCELLED | OUTPATIENT
Start: 2022-03-16 | End: 2022-03-16

## 2022-03-16 RX ORDER — EPINEPHRINE 0.3 MG/.3ML
0.3 INJECTION SUBCUTANEOUS ONCE
Status: CANCELLED | OUTPATIENT
Start: 2022-03-16 | End: 2022-03-16

## 2022-03-16 RX ORDER — ACETAMINOPHEN 325 MG/1
650 TABLET ORAL ONCE
Status: CANCELLED | OUTPATIENT
Start: 2022-03-16 | End: 2022-03-16

## 2022-03-16 RX ORDER — ALBUTEROL SULFATE 90 UG/1
2 AEROSOL, METERED RESPIRATORY (INHALATION) ONCE
Status: CANCELLED | OUTPATIENT
Start: 2022-03-16 | End: 2022-03-16

## 2022-03-16 RX ORDER — PREDNISONE 20 MG/1
40 TABLET ORAL ONCE
Status: CANCELLED | OUTPATIENT
Start: 2022-03-16 | End: 2022-03-16

## 2022-03-16 RX ORDER — DIPHENHYDRAMINE HCL 25 MG
25 CAPSULE ORAL ONCE
Status: CANCELLED | OUTPATIENT
Start: 2022-03-16 | End: 2022-03-16

## 2022-03-16 RX ADMIN — Medication 300 MG: at 08:03

## 2022-03-16 NOTE — NURSING
Pt here for 1st dose of Evusheld. Patient received two gluteal intramuscular injections (left and right) of tixagevimab/cilgavimab (EVUSHELD) with no difficulties tolerating the medication

## 2022-03-17 NOTE — PROGRESS NOTES
1st check to see if patient has seen the results.  If not then  CALL patient with results and Document verification.  Schedule follow-up if needed.  [phone]  A1c is improved from previous.  Keep up the great work!  Repeat A1c in three months.

## 2022-03-18 ENCOUNTER — TELEPHONE (OUTPATIENT)
Dept: TRANSPLANT | Facility: CLINIC | Age: 62
End: 2022-03-18
Payer: MEDICARE

## 2022-03-18 ENCOUNTER — DOCUMENT SCAN (OUTPATIENT)
Dept: HOME HEALTH SERVICES | Facility: HOSPITAL | Age: 62
End: 2022-03-18
Payer: MEDICARE

## 2022-03-18 NOTE — TELEPHONE ENCOUNTER
Pt's daughter called about patient's recent clinic visit.  She asked about patient's prognosis.  Explained that patient's lung function has decreased since his COVID infection and that he is requiring oxygen at all times, even at rest.  He did not qualify for an increased liter flow on his 6 MWT.  Explained that Dr. Mcdonald did discuss the need for him to wear his CPAP and that he should reach out to his sleep med provider to discuss getting a proper fitting mask.  Contacted patient regarding this who states he is trying to schedule an appointment with Dr. Corona.  Pt's daughter asked if she should move patient to be with her in [place].  Explained that they would need to decide that amongst themselves.  Explained that patient and Dr. Mcdonald discussed palliative medicine, but pt did not agree to referral.  Explained that Palliative medicine helps with symptom management.  They also refer to hospice when they feel the patient is ready.  Explained that patient will be scheduled to come back to clinic in a month and explained that she could accompany patient to his appointment.  If she can not be here, she can ask the patient to call her and put her on speaker phone so she can hear the discussion with the doctor and she could ask any questions that she has.  Explained that the  could also come in and answer any questions that she has.  Pt's daughter verbalized understanding and had no further questions.

## 2022-03-22 RX ORDER — SUCROFERRIC OXYHYDROXIDE 500 MG/1
TABLET, CHEWABLE ORAL
Qty: 330 TABLET | Refills: 11 | Status: SHIPPED | OUTPATIENT
Start: 2022-03-22 | End: 2022-05-19

## 2022-03-28 ENCOUNTER — PATIENT OUTREACH (OUTPATIENT)
Dept: ADMINISTRATIVE | Facility: OTHER | Age: 62
End: 2022-03-28
Payer: MEDICARE

## 2022-03-28 ENCOUNTER — OFFICE VISIT (OUTPATIENT)
Dept: DERMATOLOGY | Facility: CLINIC | Age: 62
End: 2022-03-28
Payer: MEDICARE

## 2022-03-28 DIAGNOSIS — Z12.11 ENCOUNTER FOR FIT (FECAL IMMUNOCHEMICAL TEST) SCREENING: Primary | ICD-10-CM

## 2022-03-28 DIAGNOSIS — D18.00 HEMANGIOMA, UNSPECIFIED SITE: ICD-10-CM

## 2022-03-28 DIAGNOSIS — L81.4 SOLAR LENTIGO: ICD-10-CM

## 2022-03-28 DIAGNOSIS — L82.1 SEBORRHEIC KERATOSES: ICD-10-CM

## 2022-03-28 DIAGNOSIS — Z12.83 SKIN CANCER SCREENING: ICD-10-CM

## 2022-03-28 DIAGNOSIS — D22.9 MULTIPLE BENIGN NEVI: Primary | ICD-10-CM

## 2022-03-28 PROCEDURE — 4010F PR ACE/ARB THEARPY RXD/TAKEN: ICD-10-PCS | Mod: CPTII,S$GLB,, | Performed by: STUDENT IN AN ORGANIZED HEALTH CARE EDUCATION/TRAINING PROGRAM

## 2022-03-28 PROCEDURE — 1159F PR MEDICATION LIST DOCUMENTED IN MEDICAL RECORD: ICD-10-PCS | Mod: CPTII,S$GLB,, | Performed by: STUDENT IN AN ORGANIZED HEALTH CARE EDUCATION/TRAINING PROGRAM

## 2022-03-28 PROCEDURE — 4010F ACE/ARB THERAPY RXD/TAKEN: CPT | Mod: CPTII,S$GLB,, | Performed by: STUDENT IN AN ORGANIZED HEALTH CARE EDUCATION/TRAINING PROGRAM

## 2022-03-28 PROCEDURE — 99203 PR OFFICE/OUTPT VISIT, NEW, LEVL III, 30-44 MIN: ICD-10-PCS | Mod: S$GLB,,, | Performed by: STUDENT IN AN ORGANIZED HEALTH CARE EDUCATION/TRAINING PROGRAM

## 2022-03-28 PROCEDURE — 1160F RVW MEDS BY RX/DR IN RCRD: CPT | Mod: CPTII,S$GLB,, | Performed by: STUDENT IN AN ORGANIZED HEALTH CARE EDUCATION/TRAINING PROGRAM

## 2022-03-28 PROCEDURE — 99999 PR PBB SHADOW E&M-EST. PATIENT-LVL II: ICD-10-PCS | Mod: PBBFAC,,, | Performed by: STUDENT IN AN ORGANIZED HEALTH CARE EDUCATION/TRAINING PROGRAM

## 2022-03-28 PROCEDURE — 99203 OFFICE O/P NEW LOW 30 MIN: CPT | Mod: S$GLB,,, | Performed by: STUDENT IN AN ORGANIZED HEALTH CARE EDUCATION/TRAINING PROGRAM

## 2022-03-28 PROCEDURE — 99999 PR PBB SHADOW E&M-EST. PATIENT-LVL II: CPT | Mod: PBBFAC,,, | Performed by: STUDENT IN AN ORGANIZED HEALTH CARE EDUCATION/TRAINING PROGRAM

## 2022-03-28 PROCEDURE — 3051F HG A1C>EQUAL 7.0%<8.0%: CPT | Mod: CPTII,S$GLB,, | Performed by: STUDENT IN AN ORGANIZED HEALTH CARE EDUCATION/TRAINING PROGRAM

## 2022-03-28 PROCEDURE — 3072F LOW RISK FOR RETINOPATHY: CPT | Mod: CPTII,S$GLB,, | Performed by: STUDENT IN AN ORGANIZED HEALTH CARE EDUCATION/TRAINING PROGRAM

## 2022-03-28 PROCEDURE — 3051F PR MOST RECENT HEMOGLOBIN A1C LEVEL 7.0 - < 8.0%: ICD-10-PCS | Mod: CPTII,S$GLB,, | Performed by: STUDENT IN AN ORGANIZED HEALTH CARE EDUCATION/TRAINING PROGRAM

## 2022-03-28 PROCEDURE — 1160F PR REVIEW ALL MEDS BY PRESCRIBER/CLIN PHARMACIST DOCUMENTED: ICD-10-PCS | Mod: CPTII,S$GLB,, | Performed by: STUDENT IN AN ORGANIZED HEALTH CARE EDUCATION/TRAINING PROGRAM

## 2022-03-28 PROCEDURE — 3072F PR LOW RISK FOR RETINOPATHY: ICD-10-PCS | Mod: CPTII,S$GLB,, | Performed by: STUDENT IN AN ORGANIZED HEALTH CARE EDUCATION/TRAINING PROGRAM

## 2022-03-28 PROCEDURE — 1159F MED LIST DOCD IN RCRD: CPT | Mod: CPTII,S$GLB,, | Performed by: STUDENT IN AN ORGANIZED HEALTH CARE EDUCATION/TRAINING PROGRAM

## 2022-03-28 NOTE — PROGRESS NOTES
Patient Information  Name: Martin Hendrix Jr.  : 1960  MRN: [id number]     Referring Physician:  Dr. Villa   Primary Care Physician:  Dr. Michel Henley MD   Date of Visit: 2022      Subjective:       Martin Hendrix Jr. is a 61 y.o. male who presents for   Chief Complaint   Patient presents with    Skin Check     HPI  Patient here for skin check.     Does patient have a personal hx of skin cancers? no  Does patient have family hx of melanoma?  no  Does patient have hx of strong sun exposure or tanning bed use in the past? No    Patient with hx of lung transplant (Aug 2017).    Patient was last seen:Visit date not found     Prior notes by myself reviewed.   Clinical documentation obtained by nursing staff reviewed.    Review of Systems   Skin: Negative for itching and rash.        Objective:    Physical Exam   Constitutional: He appears well-developed and well-nourished. No distress.   Neurological: He is alert and oriented to person, place, and time. He is not disoriented.   Psychiatric: He has a normal mood and affect.   Skin:   Areas Examined (abnormalities noted in diagram):   Scalp / Hair Palpated and Inspected  Head / Face Inspection Performed  Neck Inspection Performed  Chest / Axilla Inspection Performed  Abdomen Inspection Performed  Genitals / Buttocks / Groin Inspection Performed  Back Inspection Performed  RUE Inspected  LUE Inspection Performed  RLE Inspected  LLE Inspection Performed  Nails and Digits Inspection Performed                   Diagram Legend     Erythematous scaling macule/papule c/w actinic keratosis       Vascular papule c/w angioma      Pigmented verrucoid papule/plaque c/w seborrheic keratosis      Yellow umbilicated papule c/w sebaceous hyperplasia      Irregularly shaped tan macule c/w lentigo     1-2 mm smooth white papules consistent with Milia      Movable subcutaneous cyst with punctum c/w epidermal inclusion cyst      Subcutaneous movable cyst c/w pilar cyst       Firm pink to brown papule c/w dermatofibroma      Pedunculated fleshy papule(s) c/w skin tag(s)      Evenly pigmented macule c/w junctional nevus     Mildly variegated pigmented, slightly irregular-bordered macule c/w mildly atypical nevus      Flesh colored to evenly pigmented papule c/w intradermal nevus       Pink pearly papule/plaque c/w basal cell carcinoma      Erythematous hyperkeratotic cursted plaque c/w SCC      Surgical scar with no sign of skin cancer recurrence      Open and closed comedones      Inflammatory papules and pustules      Verrucoid papule consistent consistent with wart     Erythematous eczematous patches and plaques     Dystrophic onycholytic nail with subungual debris c/w onychomycosis     Umbilicated papule    Erythematous-base heme-crusted tan verrucoid plaque consistent with inflamed seborrheic keratosis     Erythematous Silvery Scaling Plaque c/w Psoriasis     See annotation      No images are attached to the encounter or orders placed in the encounter.    [] Data reviewed  [] Independent review of test  [] Management discussed with another provider    Assessment / Plan:        Multiple benign nevi  Discussed ABCDE's of nevi.  Monitor for new mole or moles that are becoming bigger, darker, irritated, or developing irregular borders. Brochure provided. Instructed patient to observe lesion(s) for changes and follow up in clinic if changes are noted. Patient to monitor skin at home for new or changing lesions.     Skin cancer screening  Total body skin examination performed today including at least 12 points as noted in physical examination. No lesions suspicious for malignancy noted.    Recommend daily sun protection/avoidance, use of at least SPF 30, broad spectrum sunscreen (OTC drug), skin self examinations, and routine physician surveillance to optimize early detection    Seborrheic keratoses  These are benign inherited growths without a malignant potential. Reassurance given to  patient. No treatment is necessary.     Hemangioma, unspecified site  This is a benign vascular lesion. Reassurance given. No treatment required.     Solar lentigo  This is a benign hyperpigmented sun induced lesion. Recommend daily sun protection/avoidance and use of at least SPF 30, broad spectrum sunscreen (OTC drug) will reduce the number of new lesions. Treatment of these benign lesions are considered cosmetic.               LOS NUMBER AND COMPLEXITY OF PROBLEMS    COMPLEXITY OF DATA RISK TOTAL TIME (m)   55173  46556 [] 1 self-limited or minor problem [x] Minimal to none [] No treatment recommended or patient to monitor 15-29  10-19   16350  35391 Low  [] 2 or > self limited or minor problems  [] 1 stable chronic illness  [] 1 acute, uncomplicated illness or injury Limited (2)  [] Prior external notes from each unique source  [] Review result of each unique test  [] Order each unique test [x]  Low  OTC medications, minor skin biopsy 30-44  20-29   03127  93551 Moderate  []  1 or > chronic illness with progression, exacerbation or SE of treatment  [x]  2 or more stable chronic illnesses  []  1 acute illness with systemic symptoms  []  1 acute complicated injury  []  1 undiagnosed new problem with uncertain prognosis Moderate (1/3 below)  []  3 or more data items        *Now includes assessment requiring independent historian  []  Independent interpretation of a test  []  Discuss management/test with another provider Moderate  []  Prescription drug mgmt  []  Minor surgery with risk discussed  []  Mgmt limited by social determinates 45-59  30-39   36864  23554 High  []  1 or more chronic illness with severe exacerbation, progression or SE of treatment  []  1 acute or chronic illness/injury that poses a threat to life or bodily function Extensive (2/3 below)  []  3 or more data items        *Now includes assessment requiring independent historian.  []  Independent interpretation of a test  []  Discuss  management/test with another provider High  []  Major surgery with risk discussed  []  Drug therapy requiring intensive monitoring for toxicity  []  Hospitalization  []  Decision for DNR 60-74  40-54      No follow-ups on file.    Marisela Hendrickson MD, FAAD  [facility]

## 2022-03-31 ENCOUNTER — DOCUMENTATION ONLY (OUTPATIENT)
Dept: TRANSPLANT | Facility: CLINIC | Age: 62
End: 2022-03-31
Payer: MEDICARE

## 2022-04-04 ENCOUNTER — HOSPITAL ENCOUNTER (OUTPATIENT)
Dept: RADIOLOGY | Facility: HOSPITAL | Age: 62
Discharge: HOME | End: 2022-04-04
Attending: INTERNAL MEDICINE
Payer: MEDICARE

## 2022-04-04 ENCOUNTER — HOSPITAL ENCOUNTER (OUTPATIENT)
Dept: PULMONOLOGY | Facility: HOSPITAL | Age: 62
Discharge: HOME | End: 2022-04-04
Attending: INTERNAL MEDICINE
Payer: MEDICARE

## 2022-04-04 ENCOUNTER — OFFICE VISIT (OUTPATIENT)
Dept: TRANSPLANT | Facility: CLINIC | Age: 62
End: 2022-04-04
Payer: MEDICARE

## 2022-04-04 VITALS
RESPIRATION RATE: 20 BRPM | DIASTOLIC BLOOD PRESSURE: 90 MMHG | SYSTOLIC BLOOD PRESSURE: 181 MMHG | TEMPERATURE: 96 F | OXYGEN SATURATION: 97 % | BODY MASS INDEX: 29.4 KG/M2 | HEART RATE: 92 BPM | WEIGHT: 210 LBS | HEIGHT: 71 IN

## 2022-04-04 DIAGNOSIS — D84.9 IMMUNOSUPPRESSION: ICD-10-CM

## 2022-04-04 DIAGNOSIS — Z94.2 LUNG REPLACED BY TRANSPLANT: ICD-10-CM

## 2022-04-04 DIAGNOSIS — J96.11 CHRONIC RESPIRATORY FAILURE WITH HYPOXIA: ICD-10-CM

## 2022-04-04 DIAGNOSIS — Z94.2 LUNG REPLACED BY TRANSPLANT: Primary | ICD-10-CM

## 2022-04-04 DIAGNOSIS — Z79.2 PROPHYLACTIC ANTIBIOTIC: ICD-10-CM

## 2022-04-04 LAB
FEF 25 75 LLN: 1.5
FEF 25 75 PRE REF: 14.6 %
FEF 25 75 REF: 3.03
FEV05 LLN: 1.69
FEV05 REF: 2.83
FEV1 FVC LLN: 66
FEV1 FVC PRE REF: 85.1 %
FEV1 FVC REF: 78
FEV1 LLN: 2.73
FEV1 PRE REF: 23.6 %
FEV1 REF: 3.61
FVC LLN: 3.57
FVC PRE REF: 27.7 %
FVC REF: 4.67
PEF LLN: 6.98
PEF PRE REF: 34.9 %
PEF REF: 9.3
PHYSICIAN COMMENT: ABNORMAL
PRE FEF 25 75: 0.44 L/S (ref 1.5–5.09)
PRE FET 100: 6.85 SEC
PRE FEV05 REF: 23.4 %
PRE FEV1 FVC: 66.01 % (ref 65.68–87.95)
PRE FEV1: 0.85 L (ref 2.73–4.45)
PRE FEV5: 0.66 L (ref 1.69–3.96)
PRE FVC: 1.29 L (ref 3.57–5.78)
PRE PEF: 3.24 L/S (ref 6.98–11.61)

## 2022-04-04 PROCEDURE — 99214 PR OFFICE/OUTPT VISIT, EST, LEVL IV, 30-39 MIN: ICD-10-PCS | Mod: 25,S$GLB,, | Performed by: INTERNAL MEDICINE

## 2022-04-04 PROCEDURE — 94010 BREATHING CAPACITY TEST: ICD-10-PCS | Mod: 26,,, | Performed by: INTERNAL MEDICINE

## 2022-04-04 PROCEDURE — 71046 XR CHEST PA AND LATERAL: ICD-10-PCS | Mod: 26,,, | Performed by: RADIOLOGY

## 2022-04-04 PROCEDURE — 1159F PR MEDICATION LIST DOCUMENTED IN MEDICAL RECORD: ICD-10-PCS | Mod: CPTII,S$GLB,, | Performed by: INTERNAL MEDICINE

## 2022-04-04 PROCEDURE — 3051F PR MOST RECENT HEMOGLOBIN A1C LEVEL 7.0 - < 8.0%: ICD-10-PCS | Mod: CPTII,S$GLB,, | Performed by: INTERNAL MEDICINE

## 2022-04-04 PROCEDURE — 71046 X-RAY EXAM CHEST 2 VIEWS: CPT | Mod: 26,,, | Performed by: RADIOLOGY

## 2022-04-04 PROCEDURE — 94010 BREATHING CAPACITY TEST: CPT | Mod: 26,,, | Performed by: INTERNAL MEDICINE

## 2022-04-04 PROCEDURE — 99999 PR PBB SHADOW E&M-EST. PATIENT-LVL III: ICD-10-PCS | Mod: PBBFAC,,, | Performed by: INTERNAL MEDICINE

## 2022-04-04 PROCEDURE — 3077F PR MOST RECENT SYSTOLIC BLOOD PRESSURE >= 140 MM HG: ICD-10-PCS | Mod: CPTII,S$GLB,, | Performed by: INTERNAL MEDICINE

## 2022-04-04 PROCEDURE — 3077F SYST BP >= 140 MM HG: CPT | Mod: CPTII,S$GLB,, | Performed by: INTERNAL MEDICINE

## 2022-04-04 PROCEDURE — 1159F MED LIST DOCD IN RCRD: CPT | Mod: CPTII,S$GLB,, | Performed by: INTERNAL MEDICINE

## 2022-04-04 PROCEDURE — 4010F ACE/ARB THERAPY RXD/TAKEN: CPT | Mod: CPTII,S$GLB,, | Performed by: INTERNAL MEDICINE

## 2022-04-04 PROCEDURE — 3080F PR MOST RECENT DIASTOLIC BLOOD PRESSURE >= 90 MM HG: ICD-10-PCS | Mod: CPTII,S$GLB,, | Performed by: INTERNAL MEDICINE

## 2022-04-04 PROCEDURE — 3080F DIAST BP >= 90 MM HG: CPT | Mod: CPTII,S$GLB,, | Performed by: INTERNAL MEDICINE

## 2022-04-04 PROCEDURE — 71046 X-RAY EXAM CHEST 2 VIEWS: CPT | Mod: TC

## 2022-04-04 PROCEDURE — 99999 PR PBB SHADOW E&M-EST. PATIENT-LVL III: CPT | Mod: PBBFAC,,, | Performed by: INTERNAL MEDICINE

## 2022-04-04 PROCEDURE — 4010F PR ACE/ARB THEARPY RXD/TAKEN: ICD-10-PCS | Mod: CPTII,S$GLB,, | Performed by: INTERNAL MEDICINE

## 2022-04-04 PROCEDURE — 3008F PR BODY MASS INDEX (BMI) DOCUMENTED: ICD-10-PCS | Mod: CPTII,S$GLB,, | Performed by: INTERNAL MEDICINE

## 2022-04-04 PROCEDURE — 3072F LOW RISK FOR RETINOPATHY: CPT | Mod: CPTII,S$GLB,, | Performed by: INTERNAL MEDICINE

## 2022-04-04 PROCEDURE — 3072F PR LOW RISK FOR RETINOPATHY: ICD-10-PCS | Mod: CPTII,S$GLB,, | Performed by: INTERNAL MEDICINE

## 2022-04-04 PROCEDURE — 3051F HG A1C>EQUAL 7.0%<8.0%: CPT | Mod: CPTII,S$GLB,, | Performed by: INTERNAL MEDICINE

## 2022-04-04 PROCEDURE — 99214 OFFICE O/P EST MOD 30 MIN: CPT | Mod: 25,S$GLB,, | Performed by: INTERNAL MEDICINE

## 2022-04-04 PROCEDURE — 3008F BODY MASS INDEX DOCD: CPT | Mod: CPTII,S$GLB,, | Performed by: INTERNAL MEDICINE

## 2022-04-04 RX ORDER — AZITHROMYCIN 250 MG/1
250 TABLET, FILM COATED ORAL
Qty: 13 TABLET | Refills: 11 | Status: SHIPPED | OUTPATIENT
Start: 2022-04-04 | End: 2023-01-01 | Stop reason: SDUPTHER

## 2022-04-04 NOTE — TELEPHONE ENCOUNTER
Pt's kate prescription is for 500 mg MWF.  Discussed with Dr. Mcdonald who ordered to decrease the dose to 250 mg MWF.  Contacted patient to review.  He states he has been taking 250 mg MWF.  Explained that I would send a new prescription to reflect the correct dose to [facility].  Pt verbalized understanding.

## 2022-04-05 NOTE — PROGRESS NOTES
Spoke with Martin in lab regarding adding on an everolimus trough to yesterday's labs.  She states she will add and will contact me if she has any problems.

## 2022-04-07 ENCOUNTER — TELEPHONE (OUTPATIENT)
Dept: TRANSPLANT | Facility: CLINIC | Age: 62
End: 2022-04-07
Payer: MEDICARE

## 2022-04-07 NOTE — TELEPHONE ENCOUNTER
Notified patient that no dose changes are needed, however, Dr. Mcdonald recommends that he discuss his BP with his Nephrologist.  BP was 181/90 in clinic on 4/4.  Pt states he had not taken his BP meds yet that morning.  He states it is monitored in HD 3 times a week and tends to be stable.  He will discuss with his Nephrologist on Saturday when he is at dialysis.  ----- Message from Robina Mcdonald MD sent at 4/6/2022  5:05 PM CDT -----  No change to evero or tac  Needs better BP control.  Please, have him discuss this with his nephrologist.

## 2022-04-25 ENCOUNTER — HOSPITAL ENCOUNTER (OUTPATIENT)
Dept: VASCULAR SURGERY | Facility: CLINIC | Age: 62
Discharge: HOME | End: 2022-04-25
Attending: SURGERY
Payer: MEDICARE

## 2022-04-25 ENCOUNTER — OFFICE VISIT (OUTPATIENT)
Dept: VASCULAR SURGERY | Facility: CLINIC | Age: 62
End: 2022-04-25
Attending: SURGERY
Payer: MEDICARE

## 2022-04-25 VITALS
HEIGHT: 71 IN | HEART RATE: 88 BPM | TEMPERATURE: 98 F | WEIGHT: 213.88 LBS | SYSTOLIC BLOOD PRESSURE: 132 MMHG | DIASTOLIC BLOOD PRESSURE: 59 MMHG | BODY MASS INDEX: 29.94 KG/M2

## 2022-04-25 DIAGNOSIS — N18.6 ESRD (END STAGE RENAL DISEASE) ON DIALYSIS: ICD-10-CM

## 2022-04-25 DIAGNOSIS — Z99.2 ESRD ON HEMODIALYSIS: Primary | ICD-10-CM

## 2022-04-25 DIAGNOSIS — N18.6 ESRD ON HEMODIALYSIS: Primary | ICD-10-CM

## 2022-04-25 DIAGNOSIS — Z99.2 ESRD (END STAGE RENAL DISEASE) ON DIALYSIS: ICD-10-CM

## 2022-04-25 DIAGNOSIS — Z99.2 ARTERIOVENOUS FISTULA FOR HEMODIALYSIS IN PLACE, PRIMARY: ICD-10-CM

## 2022-04-25 PROCEDURE — 3072F LOW RISK FOR RETINOPATHY: CPT | Mod: CPTII,S$GLB,, | Performed by: SURGERY

## 2022-04-25 PROCEDURE — 1159F MED LIST DOCD IN RCRD: CPT | Mod: CPTII,S$GLB,, | Performed by: SURGERY

## 2022-04-25 PROCEDURE — 4010F PR ACE/ARB THEARPY RXD/TAKEN: ICD-10-PCS | Mod: CPTII,S$GLB,, | Performed by: SURGERY

## 2022-04-25 PROCEDURE — 3051F PR MOST RECENT HEMOGLOBIN A1C LEVEL 7.0 - < 8.0%: ICD-10-PCS | Mod: CPTII,S$GLB,, | Performed by: SURGERY

## 2022-04-25 PROCEDURE — 3075F PR MOST RECENT SYSTOLIC BLOOD PRESS GE 130-139MM HG: ICD-10-PCS | Mod: CPTII,S$GLB,, | Performed by: SURGERY

## 2022-04-25 PROCEDURE — 1159F PR MEDICATION LIST DOCUMENTED IN MEDICAL RECORD: ICD-10-PCS | Mod: CPTII,S$GLB,, | Performed by: SURGERY

## 2022-04-25 PROCEDURE — 99212 OFFICE O/P EST SF 10 MIN: CPT | Mod: S$GLB,,, | Performed by: SURGERY

## 2022-04-25 PROCEDURE — 3072F PR LOW RISK FOR RETINOPATHY: ICD-10-PCS | Mod: CPTII,S$GLB,, | Performed by: SURGERY

## 2022-04-25 PROCEDURE — 3075F SYST BP GE 130 - 139MM HG: CPT | Mod: CPTII,S$GLB,, | Performed by: SURGERY

## 2022-04-25 PROCEDURE — 3078F PR MOST RECENT DIASTOLIC BLOOD PRESSURE < 80 MM HG: ICD-10-PCS | Mod: CPTII,S$GLB,, | Performed by: SURGERY

## 2022-04-25 PROCEDURE — 3078F DIAST BP <80 MM HG: CPT | Mod: CPTII,S$GLB,, | Performed by: SURGERY

## 2022-04-25 PROCEDURE — 93990 PR DUPLEX HEMODIALYSIS ACCESS: ICD-10-PCS | Mod: S$GLB,,, | Performed by: SURGERY

## 2022-04-25 PROCEDURE — 3008F BODY MASS INDEX DOCD: CPT | Mod: CPTII,S$GLB,, | Performed by: SURGERY

## 2022-04-25 PROCEDURE — 4010F ACE/ARB THERAPY RXD/TAKEN: CPT | Mod: CPTII,S$GLB,, | Performed by: SURGERY

## 2022-04-25 PROCEDURE — 99999 PR PBB SHADOW E&M-EST. PATIENT-LVL IV: CPT | Mod: PBBFAC,,, | Performed by: SURGERY

## 2022-04-25 PROCEDURE — 3008F PR BODY MASS INDEX (BMI) DOCUMENTED: ICD-10-PCS | Mod: CPTII,S$GLB,, | Performed by: SURGERY

## 2022-04-25 PROCEDURE — 99999 PR PBB SHADOW E&M-EST. PATIENT-LVL IV: ICD-10-PCS | Mod: PBBFAC,,, | Performed by: SURGERY

## 2022-04-25 PROCEDURE — 99212 PR OFFICE/OUTPT VISIT, EST, LEVL II, 10-19 MIN: ICD-10-PCS | Mod: S$GLB,,, | Performed by: SURGERY

## 2022-04-25 PROCEDURE — 93990 DOPPLER FLOW TESTING: CPT | Mod: S$GLB,,, | Performed by: SURGERY

## 2022-04-25 PROCEDURE — 3051F HG A1C>EQUAL 7.0%<8.0%: CPT | Mod: CPTII,S$GLB,, | Performed by: SURGERY

## 2022-04-26 ENCOUNTER — PATIENT MESSAGE (OUTPATIENT)
Dept: ADMINISTRATIVE | Facility: HOSPITAL | Age: 62
End: 2022-04-26
Payer: MEDICARE

## 2022-04-26 NOTE — PROGRESS NOTES
VASCULAR SURGERY NOTE    Patient ID: Martin Hendrix Jr. is a 61 y.o. male.    I. HISTORY     Chief Complaint: AVF    HPI: Martin Hendrix Jr. is a 61 y.o. male who is here today for post-op appointment.  The patient has a left radiocephalic AV fistula in the distal forearm which was created by me on 10/13/2021.  He returned to the OR 11/18/21 for ligation of side branches because the fistula had not had adequate maturation at his follow up ultrasound. He then was found to have focal stenosis on ultrasound which taken to OR for fistulagram and PTA with resolution of stenosis on 1/10/22. He has been cannulating/dialyzing through his AVF without issues since that time. Denies prolonged bleeding from cannulation sites. Denies any alarms or pressure issues on the machine.      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 Peacock    DM (diabetes mellitus) 11/2017     02/22/2021 Insulin x 2017    Hypertension     On home oxygen therapy     KRISTYN on CPAP     Osteopenia     Pancreatitis 2009    hospitalized    Pulmonary hypertension     Pure hypercholesterolemia 11/15/2017    Sarcoidosis     Shortness of breath     Type 2 diabetes mellitus 11/5/2017        Past Surgical History:   Procedure Laterality Date    ABDOMINAL SURGERY      6 weeks old    AV FISTULA PLACEMENT Left 10/13/2021    Procedure: CREATION, AV FISTULA;  Surgeon: CARLI Thomason II, MD;  Location: [facility] OR [address] [place] [address];  Service: Vascular;  Laterality: Left;    BRONCHOSCOPY      BRONCHOSCOPY N/A 8/22/2018    Procedure: flexible bronchoscopy with tissue biopsy CPT 11962;  Surgeon: [facility] Diagnostic Provider;  Location: [address];  Service: Endoscopy;  Laterality: N/A;    BRONCHOSCOPY N/A 11/20/2018    Procedure: flexible bronchoscopy with tissue biopy CPT  51000;  Surgeon: [facility] Diagnostic Provider;  [facility];  Service: Anesthesiology;  Laterality: N/A;    BRONCHOSCOPY N/A 11/10/2021    Procedure: Flexible bronchoscopy;  Surgeon: Samantha Bill DO;  [facility];  Service: Endoscopy;  Laterality: N/A;    CHEST TUBE INSERTION      CHOLECYSTECTOMY      COLONOSCOPY      ESOPHAGOGASTRODUODENOSCOPY N/A 8/6/2018    Procedure: EGD (ESOPHAGOGASTRODUODENOSCOPY);  Surgeon: Ramu Eisenberg MD;  Location: [facility] ENDO (2ND FLR);  Service: Endoscopy;  Laterality: N/A;  off pepcid and all acid reducers for 2 weeks; PA > 50    FISTULOGRAM Left 1/10/2022    Procedure: Fistulogram;  Surgeon: CARLI Thomason II, MD;  Location: [facility] CATH LAB;  Service: Vascular;  Laterality: Left;    INSERTION OF TUNNELED CENTRAL VENOUS HEMODIALYSIS CATHETER N/A 3/13/2019    Procedure: INSERTION, CATHETER, CENTRAL VENOUS, HEMODIALYSIS, TUNNELED;  Surgeon: Edy Gonzalez MD;  Location: [facility] CATH LAB;  Service: Nephrology;  Laterality: N/A;    INSERTION OF TUNNELED CENTRAL VENOUS HEMODIALYSIS CATHETER Right 5/7/2021    Procedure: Insertion, Catheter, Central Venous, Hemodialysis;  Surgeon: Mary Joshi MD;  Location: [facility] CATH LAB;  Service: Interventional Nephrology;  Laterality: Right;    LUNG BIOPSY      LUNG TRANSPLANT  08/2017    PERCUTANEOUS TRANSLUMINAL ANGIOPLASTY OF ARTERIOVENOUS FISTULA N/A 1/10/2022    Procedure: PTA, AV FISTULA;  Surgeon: CARLI Thomason II, MD;  Location: [facility] CATH LAB;  Service: Vascular;  Laterality: N/A;    REMOVAL OF TUNNELED CENTRAL VENOUS CATHETER ([facility]) N/A 5/16/2019    Procedure: REMOVAL, CATHETER, CENTRAL VENOUS, TUNNELED;  Surgeon: Edy Gonzalez MD;  Location: [facility] CATH LAB;  Service: Nephrology;  Laterality: N/A;    REVISION OF ARTERIOVENOUS FISTULA Left 11/18/2021    Procedure: REVISION, AV FISTULA;  Surgeon: CARLI Thomason II, MD;  [facility];  Service: Vascular;  Laterality: Left;   Ligation of side branches    TONSILLECTOMY       Social History     Occupational History    Not on file   Tobacco Use    Smoking status: Never Smoker    Smokeless tobacco: Never Used   Substance and Sexual Activity    Alcohol use: No    Drug use: No    Sexual activity: Not on file         Review of Systems   Constitutional: Negative for weight loss.   HENT: Negative for ear pain and nosebleeds.    Eyes: Negative for discharge and pain.   Cardiovascular: Negative for chest pain and palpitations.   Respiratory: Negative for cough, shortness of breath and wheezing.    Endocrine: Negative for cold intolerance, heat intolerance and polyphagia.   Hematologic/Lymphatic: Negative for adenopathy. Does not bruise/bleed easily.   Skin: Negative for itching and rash.   Musculoskeletal: Negative for joint swelling and muscle cramps.   Gastrointestinal: Negative for abdominal pain, diarrhea, nausea and vomiting.   Genitourinary: Negative for dysuria and flank pain.   Neurological: Negative for numbness and seizures.         II. PHYSICAL EXAM     Physical Exam  Constitutional:       Appearance: Normal appearance. He is not ill-appearing or diaphoretic.      Comments: Nasal cannula in place   HENT:      Head: Normocephalic and atraumatic.   Eyes:      General: No scleral icterus.        Right eye: No discharge.         Left eye: No discharge.      Extraocular Movements: Extraocular movements intact.      Conjunctiva/sclera: Conjunctivae normal.   Cardiovascular:      Rate and Rhythm: Normal rate and regular rhythm.      Comments: Continuous palpable thrill over left arm radiocephalic AVF, cannulation sites healing well  Pulmonary:      Effort: Pulmonary effort is normal. No respiratory distress.   Musculoskeletal:         General: Normal range of motion.      Cervical back: Normal range of motion and neck supple.   Skin:     General: Skin is warm and dry.      Coloration: Skin is not jaundiced or pale.      Findings: No  erythema or rash.   Neurological:      General: No focal deficit present.      Mental Status: He is alert and oriented to person, place, and time.      Cranial Nerves: Cranial nerve deficit (chronic hoarseness) present.   Psychiatric:         Mood and Affect: Mood normal.         Behavior: Behavior normal.           III. ASSESSMENT & PLAN (MEDICAL DECISION MAKING)     1. ESRD on hemodialysis    2. Arteriovenous fistula for hemodialysis in place, primary        Imaging Results:  (I have personally reviewed the imaging and provided my interpretation below)    LUE AVF U/S 1/3/22: Volume flow 600ml/min. Focal velocity elevation in upper forearm PSV >800cm/s suggestive of hemodynamically significant stenosis.    LUE AVF U/S 4/25/22: Volume flow improved to 766cm/s. PSV elevation >400cm/s at AC fossa suggestive of stenosis. No other evidence of stenosis.    Assessment/Diagnosis and Plan:  61 y.o. male with left radiocephalic AVF s/p ligation of side branches and PTA of mid-forearm stenosis.  Patient is doing well with an dialyzing through his AV fistula without issues.  The patient has some persistent stenosis in mid fistula but is dialyzing without issue. Will continue to observe this as long as fistula is functioning appropriately without prolonged bleeding.      -RTC 3 months for surveillance u/s of AVF    CARLI Thomason II, MD, [facility]  Vascular Surgery  [facility]

## 2022-04-28 RX ORDER — FERRIC CITRATE 210 MG/1
TABLET, COATED ORAL
Qty: 90 TABLET | Refills: 3 | Status: SHIPPED | OUTPATIENT
Start: 2022-04-28 | End: 2022-05-19

## 2022-04-29 DIAGNOSIS — Z99.2 ESRD ON HEMODIALYSIS: Primary | ICD-10-CM

## 2022-04-29 DIAGNOSIS — N18.6 ESRD ON HEMODIALYSIS: Primary | ICD-10-CM

## 2022-05-10 ENCOUNTER — DOCUMENT SCAN (OUTPATIENT)
Dept: HOME HEALTH SERVICES | Facility: HOSPITAL | Age: 62
End: 2022-05-10
Payer: MEDICARE

## 2022-05-16 ENCOUNTER — TELEPHONE (OUTPATIENT)
Dept: TRANSPLANT | Facility: CLINIC | Age: 62
End: 2022-05-16
Payer: MEDICARE

## 2022-05-16 DIAGNOSIS — Z94.2 LUNG REPLACED BY TRANSPLANT: Primary | ICD-10-CM

## 2022-05-16 DIAGNOSIS — R53.81 PHYSICAL DECONDITIONING: ICD-10-CM

## 2022-05-16 NOTE — PROGRESS NOTES
SONG per DR. Mcdonald to order Pulmonary Rehab once PT is complete.  Pt states he has completed PT.  Explained that  Pulmonary Rehab order would be sent to [facility] and that they would contact him about scheduling. Pt verbalized understanding.

## 2022-05-19 RX ORDER — FERRIC CITRATE 210 MG/1
TABLET, COATED ORAL
Qty: 270 TABLET | Refills: 3 | Status: SHIPPED | OUTPATIENT
Start: 2022-05-19 | End: 2022-05-19

## 2022-05-19 RX ORDER — FERRIC CITRATE 210 MG/1
TABLET, COATED ORAL
Qty: 270 TABLET | Refills: 3 | Status: SHIPPED | OUTPATIENT
Start: 2022-05-19 | End: 2022-01-01 | Stop reason: SDUPTHER

## 2022-05-19 NOTE — TELEPHONE ENCOUNTER
I could not find an error. I re-ordered it but if they resend an error please call them and let them know the PA was approved on 5/10 (by dietician at [facility]) But if another error please send along

## 2022-05-31 ENCOUNTER — PATIENT MESSAGE (OUTPATIENT)
Dept: FAMILY MEDICINE | Facility: CLINIC | Age: 62
End: 2022-05-31
Payer: MEDICARE

## 2022-06-03 DIAGNOSIS — Z79.899 MEDICATION MANAGEMENT: ICD-10-CM

## 2022-06-03 DIAGNOSIS — Z94.2 LUNG REPLACED BY TRANSPLANT: Primary | ICD-10-CM

## 2022-06-09 DIAGNOSIS — N18.30 CHRONIC KIDNEY DISEASE (CKD), STAGE III (MODERATE): ICD-10-CM

## 2022-06-09 RX ORDER — FUROSEMIDE 40 MG/1
TABLET ORAL
Qty: 90 TABLET | Refills: 2 | Status: SHIPPED | OUTPATIENT
Start: 2022-06-09 | End: 2022-08-11 | Stop reason: SDUPTHER

## 2022-06-09 NOTE — TELEPHONE ENCOUNTER
No new care gaps identified.  [facility] Embedded Care Gaps. Reference number: [id number]. 6/09/2022   12:06:06 AM PATRICIA

## 2022-06-09 NOTE — TELEPHONE ENCOUNTER
Refill Routing Note   Medication(s) are not appropriate for processing by [facility] for the following reason(s):      - Required laboratory values are abnormal  - Required vitals are abnormal  - Patient has been seen in the ED/Hospital since the last PCP visit    [facility] action(s):  Route          Medication reconciliation completed: No     Appointments  past 12m or future 3m with PCP    Date Provider   Last Visit   9/14/2021 Michel Henley MD   Next Visit   9/14/2022 Michel Henley MD   ED visits in past 90 days: 0        Note composed:11:43 AM 06/09/2022

## 2022-06-30 ENCOUNTER — DOCUMENTATION ONLY (OUTPATIENT)
Dept: TRANSPLANT | Facility: CLINIC | Age: 62
End: 2022-06-30
Payer: MEDICARE

## 2022-07-06 ENCOUNTER — HOSPITAL ENCOUNTER (OUTPATIENT)
Dept: RADIOLOGY | Facility: HOSPITAL | Age: 62
Discharge: HOME | End: 2022-07-06
Attending: INTERNAL MEDICINE
Payer: MEDICARE

## 2022-07-06 ENCOUNTER — HOSPITAL ENCOUNTER (OUTPATIENT)
Dept: PULMONOLOGY | Facility: HOSPITAL | Age: 62
Discharge: HOME | End: 2022-07-06
Attending: INTERNAL MEDICINE
Payer: MEDICARE

## 2022-07-06 ENCOUNTER — OFFICE VISIT (OUTPATIENT)
Dept: TRANSPLANT | Facility: CLINIC | Age: 62
End: 2022-07-06
Payer: MEDICARE

## 2022-07-06 ENCOUNTER — PATIENT MESSAGE (OUTPATIENT)
Dept: TRANSPLANT | Facility: CLINIC | Age: 62
End: 2022-07-06

## 2022-07-06 VITALS
TEMPERATURE: 96 F | BODY MASS INDEX: 29.35 KG/M2 | SYSTOLIC BLOOD PRESSURE: 113 MMHG | RESPIRATION RATE: 20 BRPM | HEART RATE: 85 BPM | OXYGEN SATURATION: 97 % | HEIGHT: 70 IN | DIASTOLIC BLOOD PRESSURE: 74 MMHG | WEIGHT: 205 LBS

## 2022-07-06 DIAGNOSIS — Z79.4 TYPE 2 DIABETES MELLITUS WITH HYPERGLYCEMIA, WITH LONG-TERM CURRENT USE OF INSULIN: ICD-10-CM

## 2022-07-06 DIAGNOSIS — Z94.2 LUNG REPLACED BY TRANSPLANT: ICD-10-CM

## 2022-07-06 DIAGNOSIS — D84.9 IMMUNOSUPPRESSION: ICD-10-CM

## 2022-07-06 DIAGNOSIS — T86.818 BRONCHIOLITIS OBLITERANS SYNDROME DUE TO LUNG TRANSPLANTATION: ICD-10-CM

## 2022-07-06 DIAGNOSIS — J44.81 BRONCHIOLITIS OBLITERANS SYNDROME DUE TO LUNG TRANSPLANTATION: ICD-10-CM

## 2022-07-06 DIAGNOSIS — J96.11 CHRONIC RESPIRATORY FAILURE WITH HYPOXIA: ICD-10-CM

## 2022-07-06 DIAGNOSIS — Z79.2 PROPHYLACTIC ANTIBIOTIC: ICD-10-CM

## 2022-07-06 DIAGNOSIS — N18.6 ESRD (END STAGE RENAL DISEASE): ICD-10-CM

## 2022-07-06 DIAGNOSIS — G47.33 OSA ON CPAP: ICD-10-CM

## 2022-07-06 DIAGNOSIS — E11.65 TYPE 2 DIABETES MELLITUS WITH HYPERGLYCEMIA, WITH LONG-TERM CURRENT USE OF INSULIN: ICD-10-CM

## 2022-07-06 DIAGNOSIS — D64.9 ANEMIA, UNSPECIFIED TYPE: ICD-10-CM

## 2022-07-06 DIAGNOSIS — Z94.2 LUNG REPLACED BY TRANSPLANT: Primary | ICD-10-CM

## 2022-07-06 LAB
FEF 25 75 LLN: 1.49
FEF 25 75 PRE REF: 17.4 %
FEF 25 75 REF: 3.02
FEV05 LLN: 1.69
FEV05 REF: 2.83
FEV1 FVC LLN: 66
FEV1 FVC PRE REF: 84.2 %
FEV1 FVC REF: 78
FEV1 LLN: 2.72
FEV1 PRE REF: 28.5 %
FEV1 REF: 3.6
FVC LLN: 3.56
FVC PRE REF: 33.8 %
FVC REF: 4.66
PEF LLN: 6.98
PEF PRE REF: 43.5 %
PEF REF: 9.3
PHYSICIAN COMMENT: ABNORMAL
PRE FEF 25 75: 0.53 L/S (ref 1.49–5.08)
PRE FET 100: 6.74 SEC
PRE FEV05 REF: 28.8 %
PRE FEV1 FVC: 65.26 % (ref 65.61–87.94)
PRE FEV1: 1.03 L (ref 2.72–4.44)
PRE FEV5: 0.81 L (ref 1.69–3.96)
PRE FVC: 1.57 L (ref 3.56–5.77)
PRE PEF: 4.04 L/S (ref 6.98–11.61)

## 2022-07-06 PROCEDURE — 99499 UNLISTED E&M SERVICE: CPT | Mod: S$GLB,,, | Performed by: INTERNAL MEDICINE

## 2022-07-06 PROCEDURE — 94010 BREATHING CAPACITY TEST: ICD-10-PCS | Mod: 26,,, | Performed by: INTERNAL MEDICINE

## 2022-07-06 PROCEDURE — 1159F MED LIST DOCD IN RCRD: CPT | Mod: CPTII,S$GLB,, | Performed by: INTERNAL MEDICINE

## 2022-07-06 PROCEDURE — 3051F HG A1C>EQUAL 7.0%<8.0%: CPT | Mod: CPTII,S$GLB,, | Performed by: INTERNAL MEDICINE

## 2022-07-06 PROCEDURE — 4010F ACE/ARB THERAPY RXD/TAKEN: CPT | Mod: CPTII,S$GLB,, | Performed by: INTERNAL MEDICINE

## 2022-07-06 PROCEDURE — 3051F PR MOST RECENT HEMOGLOBIN A1C LEVEL 7.0 - < 8.0%: ICD-10-PCS | Mod: CPTII,S$GLB,, | Performed by: INTERNAL MEDICINE

## 2022-07-06 PROCEDURE — 99999 PR PBB SHADOW E&M-EST. PATIENT-LVL V: CPT | Mod: PBBFAC,,, | Performed by: INTERNAL MEDICINE

## 2022-07-06 PROCEDURE — 99215 PR OFFICE/OUTPT VISIT, EST, LEVL V, 40-54 MIN: ICD-10-PCS | Mod: S$GLB,,, | Performed by: INTERNAL MEDICINE

## 2022-07-06 PROCEDURE — 71046 X-RAY EXAM CHEST 2 VIEWS: CPT | Mod: TC

## 2022-07-06 PROCEDURE — 3072F LOW RISK FOR RETINOPATHY: CPT | Mod: CPTII,S$GLB,, | Performed by: INTERNAL MEDICINE

## 2022-07-06 PROCEDURE — 71046 XR CHEST PA AND LATERAL: ICD-10-PCS | Mod: 26,,, | Performed by: RADIOLOGY

## 2022-07-06 PROCEDURE — 3074F SYST BP LT 130 MM HG: CPT | Mod: CPTII,S$GLB,, | Performed by: INTERNAL MEDICINE

## 2022-07-06 PROCEDURE — 99499 RISK ADDL DX/OHS AUDIT: ICD-10-PCS | Mod: S$GLB,,, | Performed by: INTERNAL MEDICINE

## 2022-07-06 PROCEDURE — 99999 PR PBB SHADOW E&M-EST. PATIENT-LVL V: ICD-10-PCS | Mod: PBBFAC,,, | Performed by: INTERNAL MEDICINE

## 2022-07-06 PROCEDURE — 4010F PR ACE/ARB THEARPY RXD/TAKEN: ICD-10-PCS | Mod: CPTII,S$GLB,, | Performed by: INTERNAL MEDICINE

## 2022-07-06 PROCEDURE — 71046 X-RAY EXAM CHEST 2 VIEWS: CPT | Mod: 26,,, | Performed by: RADIOLOGY

## 2022-07-06 PROCEDURE — 3072F PR LOW RISK FOR RETINOPATHY: ICD-10-PCS | Mod: CPTII,S$GLB,, | Performed by: INTERNAL MEDICINE

## 2022-07-06 PROCEDURE — 1160F RVW MEDS BY RX/DR IN RCRD: CPT | Mod: CPTII,S$GLB,, | Performed by: INTERNAL MEDICINE

## 2022-07-06 PROCEDURE — 3008F PR BODY MASS INDEX (BMI) DOCUMENTED: ICD-10-PCS | Mod: CPTII,S$GLB,, | Performed by: INTERNAL MEDICINE

## 2022-07-06 PROCEDURE — 3074F PR MOST RECENT SYSTOLIC BLOOD PRESSURE < 130 MM HG: ICD-10-PCS | Mod: CPTII,S$GLB,, | Performed by: INTERNAL MEDICINE

## 2022-07-06 PROCEDURE — 1159F PR MEDICATION LIST DOCUMENTED IN MEDICAL RECORD: ICD-10-PCS | Mod: CPTII,S$GLB,, | Performed by: INTERNAL MEDICINE

## 2022-07-06 PROCEDURE — 99215 OFFICE O/P EST HI 40 MIN: CPT | Mod: S$GLB,,, | Performed by: INTERNAL MEDICINE

## 2022-07-06 PROCEDURE — 3078F PR MOST RECENT DIASTOLIC BLOOD PRESSURE < 80 MM HG: ICD-10-PCS | Mod: CPTII,S$GLB,, | Performed by: INTERNAL MEDICINE

## 2022-07-06 PROCEDURE — 1160F PR REVIEW ALL MEDS BY PRESCRIBER/CLIN PHARMACIST DOCUMENTED: ICD-10-PCS | Mod: CPTII,S$GLB,, | Performed by: INTERNAL MEDICINE

## 2022-07-06 PROCEDURE — 3008F BODY MASS INDEX DOCD: CPT | Mod: CPTII,S$GLB,, | Performed by: INTERNAL MEDICINE

## 2022-07-06 PROCEDURE — 3078F DIAST BP <80 MM HG: CPT | Mod: CPTII,S$GLB,, | Performed by: INTERNAL MEDICINE

## 2022-07-06 PROCEDURE — 94010 BREATHING CAPACITY TEST: CPT | Mod: 26,,, | Performed by: INTERNAL MEDICINE

## 2022-07-06 NOTE — PROGRESS NOTES
LUNG TRANSPLANT RECIPIENT FOLLOW-UP     Reason for Visit: Follow-up after lung transplantation.                               Date of Transplant: 8/22/17     Reason for Transplant: Sarcoidosis with pulmonary hypertension     Type of Transplant: bilateral sequential lung     CMV Status: D+ / R-      Major Complications:   1. Left vocal cord dysfunction   2. 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  A1- 1/2021  6.  Increasing DSA noted in 02/2021- s/p tx in 04/2021 with PLEX/IVIG/MP and ritux   7. 2/2022 COVID infection with residual functional decline                                                                               History of Present Illness: Martin Hendrix Jr. is a 60 y.o. year old male with the above stated history.  He is on 2L of oxygen. He is on no assisted ventilation although he has been diagnosed with KRISTYN and owns a CPAP.  His New York Heart Association Class is III and a Karnofsky score of 70% - Cares for self: Unable to carry on normal activity or active work.   He is diabetic insulin dependent.    Patient is here for a follow-up visit. He had COVID pneumonia 2/2022, and feels that he has finally returned to his baseline.  He continues to use 2L NC on exertion.  He performs all his ADLs and feels that his activity tolerance has increased and is at his baseline. He denies any exacerbations/hospitalizations since last clinic visit. He remains compliant to medications and tolerating them well. He has a known hx of KRISTYN, but not using CPAP as the mask is uncomfortable, already scheduled to see the sleep clinic. He has a known hx of ESRD on HD TThS, has been compliant as well.     Review of Systems   Constitutional: Negative for chills, diaphoresis, fever, malaise/fatigue and weight loss.   HENT: Negative for congestion, ear discharge, ear pain,  "hearing loss, nosebleeds, sinus pain, sore throat and tinnitus.    Eyes: Negative for blurred vision, double vision, photophobia, pain, discharge and redness.   Respiratory: Positive for shortness of breath (with exertion). Negative for cough, hemoptysis, sputum production, wheezing and stridor.    Cardiovascular: Negative for chest pain, palpitations, orthopnea, claudication, leg swelling and PND.   Gastrointestinal: Negative for abdominal pain, blood in stool, constipation, diarrhea, heartburn, melena, nausea and vomiting.   Genitourinary: Negative for dysuria, flank pain, frequency, hematuria and urgency.        Decreased urination since kidney failure  Musculoskeletal: Negative for back pain, falls, joint pain, myalgias and neck pain.   Skin: Negative for itching and rash.   Neurological: Negative for dizziness (notes occasional lightheadedness after HD), tingling, tremors, sensory change, speech change, focal weakness, seizures, loss of consciousness, weakness and headaches.   Endo/Heme/Allergies: Negative for environmental allergies and polydipsia. Does not bruise/bleed easily.   Psychiatric/Behavioral: Negative for depression, hallucinations, memory loss, substance abuse and suicidal ideas. The patient is not nervous/anxious and does not have insomnia.      /74   Pulse 85   Temp 96.1 °F (35.6 °C) (Oral)   Resp 20   Ht 5' 10" (1.778 m)   Wt 93 kg (205 lb 0.4 oz)   SpO2 97%   BMI 29.42 kg/m²     Physical Exam  Vitals and nursing note reviewed.   Constitutional:       General: He is not in acute distress.     Appearance: He is not ill-appearing or diaphoretic.   HENT:      Head: Normocephalic and atraumatic.      Nose: Nose normal.      Mouth/Throat:      Pharynx: No oropharyngeal exudate.   Eyes:      General: No scleral icterus.     Extraocular Movements: Extraocular movements intact.      Conjunctiva/sclera: Conjunctivae normal.   Neck:      Thyroid: No thyromegaly.      Vascular: No JVD.      " Trachea: No tracheal deviation.   Cardiovascular:      Rate and Rhythm: Normal rate and regular rhythm.      Heart sounds: Murmur (2/6 systolic murmur best heard at LUSB) heard.   Pulmonary:      Effort: Pulmonary effort is normal. No respiratory distress.      Breath sounds: No stridor.  No wheezing or rhonchi. Mild rales at the bases.  Chest:      Chest wall: No tenderness.   Abdominal:      General: Bowel sounds are normal. There is no distension.      Palpations: Abdomen is soft. There is no mass.      Tenderness: There is no abdominal tenderness. There is no guarding or rebound.   Musculoskeletal:         General: No tenderness or deformity. Normal range of motion.      Cervical back: Normal range of motion and neck supple.   Lymphadenopathy:      Cervical: No cervical adenopathy.   Skin:     General: Skin is warm and dry.      Coloration: Skin is not pale.      Findings: No erythema or rash.   Neurological:      Mental Status: He is alert and oriented to person, place, and time.      Cranial Nerves: No cranial nerve deficit.      Coordination: Coordination normal.      Gait: Gait is intact.   Psychiatric:         Mood and Affect: Mood and affect normal.         Cognition and Memory: Memory normal.         Judgment: Judgment normal.       Labs:  cbc, cmp, tacrolimus Latest Ref Rng & Units 3/14/2022 4/4/2022 7/6/2022   TACROLIMUS LVL 5.0 - 15.0 ng/mL - 2.9(L) -   WHITE BLOOD CELL COUNT 3.90 - 12.70 K/uL 6.89 9.35 8.91   RBC 4.60 - 6.20 M/uL 2.68(L) 3.53(L) 4.18(L)   HEMOGLOBIN 14.0 - 18.0 g/dL 8.3(L) 11.0(L) 12.4(L)   HEMATOCRIT 40.0 - 54.0 % 28.4(L) 36.2(L) 39.2(L)   MCV 82 - 98 fL 106(H) 103(H) 94   MCH 27.0 - 31.0 pg 31.0 31.2(H) 29.7   MCHC 32.0 - 36.0 g/dL 29.2(L) 30.4(L) 31.6(L)   RDW 11.5 - 14.5 % 14.5 14.4 14.0   PLATELETS 150 - 450 K/uL 644(H) 331 283   MPV 9.2 - 12.9 fL 8.8(L) 8.7(L) 8.8(L)   GRAN # 1.8 - 7.7 K/uL 3.0 5.3 4.8   LYMPH # 1.0 - 4.8 K/uL 1.9 2.1 2.2   MONO # 0.3 - 1.0 K/uL 1.0 1.0 1.1(H)    EOSINOPHIL% 0.0 - 8.0 % 10.9(H) 8.6(H) 7.6   BASOPHIL% 0.0 - 1.9 % 1.3 1.2 1.2   DIFFERENTIAL METHOD - Automated Automated Automated   SODIUM 136 - 145 mmol/L - 137 137   POTASSIUM 3.5 - 5.1 mmol/L - 6.0(H) 4.0   CHLORIDE 95 - 110 mmol/L - 95 92(L)   CO2 23 - 29 mmol/L - 28 30(H)   GLUCOSE 70 - 110 mg/dL - 163(H) 176(H)   BUN BLD 6 - 20 mg/dL - 55(H) 41(H)   CREATININE 0.5 - 1.4 mg/dL - 11.5(H) 10.4(H)   CALCIUM 8.7 - 10.5 mg/dL - 9.3 9.7   PROTEIN TOTAL 6.0 - 8.4 g/dL - 7.7 7.8   ALBUMIN 3.5 - 5.2 g/dL - 4.2 4.0   BILIRUBIN TOTAL 0.1 - 1.0 mg/dL - 0.5 0.7   ALK PHOS 55 - 135 U/L - 100 91   AST 10 - 40 U/L - 14 13   ALT 10 - 44 U/L - 15 18   ANION GAP 8 - 16 mmol/L - 14 15   EGFR IF AFRICAN AMERICAN >60 mL/min/1.73 m:2 - 4.9(A) 5.6(A)   EGFR IF NON-AFRICAN AMERICAN >60 mL/min/1.73 m:2 - 4.3(A) 4.8(A)     Pulmonary Function Tests 7/6/2022 4/4/2022 3/7/2022 1/12/2022 12/8/2021 11/8/2021 10/6/2021   FVC 1.57 1.29 1.35 1.65 1.66 1.65 1.66   FEV1 1.03 0.85 0.84 1.06 1.09 1.06 1.12   TLC (liters) - - - - - - -   DLCO (ml/mmHg sec) - - - - - - -   FVC% 33.8 27.7 29 35.2 35.4 35.2 35.3   FEV1% 28.5 23.6 23.2 29.3 30.2 29.3 30.9   FEF 25-75 0.53 0.44 0.39 0.54 0.58 0.53 0.62   FEF 25-75% 17.4 14.6 12.8 17.6 19 17.2 20.4   TLC% - - - - - - -   RV - - - - - - -   RV% - - - - - - -   DLCO% - - - - - - -         Imaging:  Results for orders placed during the hospital encounter of 07/06/22    X-Ray Chest PA And Lateral    Narrative  EXAMINATION:  XR CHEST PA AND LATERAL    CLINICAL HISTORY:  BSLT 8/22/2017; Lung transplant status    TECHNIQUE:  PA and lateral views of the chest were performed.    COMPARISON:  Non 04/04/2022 e    FINDINGS:  Postoperative changes as before.  Heart size normal.  Small bilateral pleural effusion and associated bibasal atelectatic changes.  The upper lung fields are clear.  No significant changes    Impression  See above      Electronically signed by: Sukhi Crooks,  MD  Date:    07/06/2022  Time:    09:11    Assessment:  1. Lung replaced by transplant     2. Immunosuppression     3. Prophylactic antibiotic     4. Chronic respiratory failure with hypoxia     5. 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 ESRD (end stage renal disease)     7. Anemia, unspecified type     8. Type 2 diabetes mellitus with hyperglycemia, with long-term current use of insulin     9. KRISTYN on CPAP          Plan:     1. FEV1 improved from previous Respiratory status with reported improvement from prior visit. Continue O2 at 2LPM on exertion.  CXR without acute changes.     2. Continue to dose tacrolimus based on levels, everolimus 0.75 mg BID, prednisone 5 mg daily. 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 Continue azithromycin MWF.     3. Continue bactrim SS.     4. Anemia -Monitor with HD labs.  Continue Epo.    5. Continue HD TThS.     6. Continue diabetes regimen per endocrinology.     7. Advised to use CPAP for KRISTYN - scheduled to see sleep clinic.    8. RTC in 3 month or sooner if needed.     Irwin Celestin NP  Lung Transplant    Attending Note:     I have seen and evaluated the patient with Irwin Celestin NP. Their note reflects the content of our discussion and my plan of care.  Stable IMTIAZ.  Continue with routine follow-up.      Samantha Bill D.O.  Pulmonary/Critical Care and Lung Transplantation  [facility]

## 2022-07-07 ENCOUNTER — PATIENT MESSAGE (OUTPATIENT)
Dept: NEPHROLOGY | Facility: CLINIC | Age: 62
End: 2022-07-07
Payer: MEDICARE

## 2022-07-07 NOTE — TELEPHONE ENCOUNTER
Forwarded to Dr. Peacock.    Patient reports his BP checks daily have been running in the 80/60 sabrina. He is concerned about taking Valsartan bid. Please advise.

## 2022-07-07 NOTE — TELEPHONE ENCOUNTER
Portal message sent and patient verbally instructed of medication changes. Read back was given successfully.

## 2022-07-25 ENCOUNTER — OFFICE VISIT (OUTPATIENT)
Dept: VASCULAR SURGERY | Facility: CLINIC | Age: 62
End: 2022-07-25
Attending: SURGERY
Payer: MEDICARE

## 2022-07-25 ENCOUNTER — HOSPITAL ENCOUNTER (OUTPATIENT)
Dept: VASCULAR SURGERY | Facility: CLINIC | Age: 62
Discharge: HOME | End: 2022-07-25
Attending: SURGERY
Payer: MEDICARE

## 2022-07-25 VITALS
BODY MASS INDEX: 28.39 KG/M2 | HEART RATE: 101 BPM | HEIGHT: 71 IN | SYSTOLIC BLOOD PRESSURE: 150 MMHG | WEIGHT: 202.81 LBS | DIASTOLIC BLOOD PRESSURE: 93 MMHG | TEMPERATURE: 99 F

## 2022-07-25 DIAGNOSIS — Z99.2 ESRD ON HEMODIALYSIS: Primary | ICD-10-CM

## 2022-07-25 DIAGNOSIS — Z99.2 ARTERIOVENOUS FISTULA FOR HEMODIALYSIS IN PLACE, PRIMARY: ICD-10-CM

## 2022-07-25 DIAGNOSIS — N18.6 ESRD ON HEMODIALYSIS: ICD-10-CM

## 2022-07-25 DIAGNOSIS — Z99.2 ESRD ON HEMODIALYSIS: ICD-10-CM

## 2022-07-25 DIAGNOSIS — N18.6 ESRD ON HEMODIALYSIS: Primary | ICD-10-CM

## 2022-07-25 PROCEDURE — 99213 PR OFFICE/OUTPT VISIT, EST, LEVL III, 20-29 MIN: ICD-10-PCS | Mod: S$GLB,,, | Performed by: SURGERY

## 2022-07-25 PROCEDURE — 3077F PR MOST RECENT SYSTOLIC BLOOD PRESSURE >= 140 MM HG: ICD-10-PCS | Mod: CPTII,S$GLB,, | Performed by: SURGERY

## 2022-07-25 PROCEDURE — 3080F PR MOST RECENT DIASTOLIC BLOOD PRESSURE >= 90 MM HG: ICD-10-PCS | Mod: CPTII,S$GLB,, | Performed by: SURGERY

## 2022-07-25 PROCEDURE — 3008F PR BODY MASS INDEX (BMI) DOCUMENTED: ICD-10-PCS | Mod: CPTII,S$GLB,, | Performed by: SURGERY

## 2022-07-25 PROCEDURE — 4010F ACE/ARB THERAPY RXD/TAKEN: CPT | Mod: CPTII,S$GLB,, | Performed by: SURGERY

## 2022-07-25 PROCEDURE — 3072F PR LOW RISK FOR RETINOPATHY: ICD-10-PCS | Mod: CPTII,S$GLB,, | Performed by: SURGERY

## 2022-07-25 PROCEDURE — 3072F LOW RISK FOR RETINOPATHY: CPT | Mod: CPTII,S$GLB,, | Performed by: SURGERY

## 2022-07-25 PROCEDURE — 4010F PR ACE/ARB THEARPY RXD/TAKEN: ICD-10-PCS | Mod: CPTII,S$GLB,, | Performed by: SURGERY

## 2022-07-25 PROCEDURE — 3051F PR MOST RECENT HEMOGLOBIN A1C LEVEL 7.0 - < 8.0%: ICD-10-PCS | Mod: CPTII,S$GLB,, | Performed by: SURGERY

## 2022-07-25 PROCEDURE — 99999 PR PBB SHADOW E&M-EST. PATIENT-LVL II: ICD-10-PCS | Mod: PBBFAC,,, | Performed by: SURGERY

## 2022-07-25 PROCEDURE — 1159F MED LIST DOCD IN RCRD: CPT | Mod: CPTII,S$GLB,, | Performed by: SURGERY

## 2022-07-25 PROCEDURE — 3008F BODY MASS INDEX DOCD: CPT | Mod: CPTII,S$GLB,, | Performed by: SURGERY

## 2022-07-25 PROCEDURE — 3051F HG A1C>EQUAL 7.0%<8.0%: CPT | Mod: CPTII,S$GLB,, | Performed by: SURGERY

## 2022-07-25 PROCEDURE — 3077F SYST BP >= 140 MM HG: CPT | Mod: CPTII,S$GLB,, | Performed by: SURGERY

## 2022-07-25 PROCEDURE — 93990 PR DUPLEX HEMODIALYSIS ACCESS: ICD-10-PCS | Mod: S$GLB,,, | Performed by: SURGERY

## 2022-07-25 PROCEDURE — 1159F PR MEDICATION LIST DOCUMENTED IN MEDICAL RECORD: ICD-10-PCS | Mod: CPTII,S$GLB,, | Performed by: SURGERY

## 2022-07-25 PROCEDURE — 93990 DOPPLER FLOW TESTING: CPT | Mod: S$GLB,,, | Performed by: SURGERY

## 2022-07-25 PROCEDURE — 99999 PR PBB SHADOW E&M-EST. PATIENT-LVL II: CPT | Mod: PBBFAC,,, | Performed by: SURGERY

## 2022-07-25 PROCEDURE — 3080F DIAST BP >= 90 MM HG: CPT | Mod: CPTII,S$GLB,, | Performed by: SURGERY

## 2022-07-25 PROCEDURE — 99213 OFFICE O/P EST LOW 20 MIN: CPT | Mod: S$GLB,,, | Performed by: SURGERY

## 2022-07-25 NOTE — PROGRESS NOTES
VASCULAR SURGERY NOTE    Patient ID: Martin Hendrix Jr. is a 61 y.o. male.    I. HISTORY     Chief Complaint: AVF    HPI: Martin Hendrix Jr. is a 61 y.o. male who is here today for established patient appointment.  The patient has a left radiocephalic AV fistula in the distal forearm which was created by me on 10/13/2021.  He returned to the OR 11/18/21 for ligation of side branches because the fistula had not had adequate maturation at his follow up ultrasound. He then was found to have focal stenosis on ultrasound which taken to OR for fistulagram and PTA with resolution of stenosis on 1/10/22. He has been cannulating/dialyzing through his AVF without issues since that time. Denies prolonged bleeding from cannulation sites. No issues dialyzing through fistula, endorses some issues with hypotension during the past couple of weeks but no issues cannulating and no alarms on machine.       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 Peacock    DM (diabetes mellitus) 11/2017     02/22/2021 Insulin x 2017    Hypertension     On home oxygen therapy     KRISTYN on CPAP     Osteopenia     Pancreatitis 2009    hospitalized    Pulmonary hypertension     Pure hypercholesterolemia 11/15/2017    Sarcoidosis     Shortness of breath     Type 2 diabetes mellitus 11/5/2017        Past Surgical History:   Procedure Laterality Date    ABDOMINAL SURGERY      6 weeks old    AV FISTULA PLACEMENT Left 10/13/2021    Procedure: CREATION, AV FISTULA;  Surgeon: CARLI Thomason II, MD;  Location: [facility] OR [address] [place] [address];  Service: Vascular;  Laterality: Left;    BRONCHOSCOPY      BRONCHOSCOPY N/A 8/22/2018    Procedure: flexible bronchoscopy with tissue biopsy CPT 58681;  Surgeon: [facility] Diagnostic Provider;  Location: [facility] OR [address] [place] [address];  Service:  Endoscopy;  Laterality: N/A;    BRONCHOSCOPY N/A 11/20/2018    Procedure: flexible bronchoscopy with tissue biopy CPT 70908;  Sur[facility]: [facility];  Location: [facility] OR 2ND FLR;  Service: Anesthesiology;  Laterality: N/A;    BRONCHOSCOPY N/A 11/10/2021    Procedure: Flexible bronchoscopy;  Surgeon: Samantha Bill DO;  Location: [facility] OR 2ND FLR;  Service: Endoscopy;  Laterality: N/A;    CHEST TUBE INSERTION      CHOLECYSTECTOMY      COLONOSCOPY      ESOPHAGOGASTRODUODENOSCOPY N/A 8/6/2018    Procedure: EGD (ESOPHAGOGASTRODUODENOSCOPY);  Surgeon: Ramu Eisenberg MD;  Location: [facility] ENDO (2ND FLR);  Service: Endoscopy;  Laterality: N/A;  off pepcid and all acid reducers for 2 weeks; PA > 50    FISTULOGRAM Left 1/10/2022    Procedure: Fistulogram;  Surgeon: CARLI Thomason II, MD;  Location: [facility] CATH LAB;  Service: Vascular;  Laterality: Left;    INSERTION OF TUNNELED CENTRAL VENOUS HEMODIALYSIS CATHETER N/A 3/13/2019    Procedure: INSERTION, CATHETER, CENTRAL VENOUS, HEMODIALYSIS, TUNNELED;  Surgeon: Edy Gonzalez MD;  Location: [facility] CATH LAB;  Service: Nephrology;  Laterality: N/A;    INSERTION OF TUNNELED CENTRAL VENOUS HEMODIALYSIS CATHETER Right 5/7/2021    Procedure: Insertion, Catheter, Central Venous, Hemodialysis;  Surgeon: Mary Joshi MD;  Location: [facility] CATH LAB;  Service: Interventional Nephrology;  Laterality: Right;    LUNG BIOPSY      LUNG TRANSPLANT  08/2017    PERCUTANEOUS TRANSLUMINAL ANGIOPLASTY OF ARTERIOVENOUS FISTULA N/A 1/10/2022    Procedure: PTA, AV FISTULA;  Surgeon: CARLI Thomason II, MD;  Location: [facility] CATH LAB;  Service: Vascular;  Laterality: N/A;    REMOVAL OF TUNNELED CENTRAL VENOUS CATHETER (CVC) N/A 5/16/2019    Procedure: REMOVAL, CATHETER, CENTRAL VENOUS, TUNNELED;  Surgeon: Edy Gonzalez MD;  Location: [facility] CATH LAB;  Service: Nephrology;  Laterality: N/A;    REVISION OF ARTERIOVENOUS FISTULA Left 11/18/2021    Procedure:  REVISION, AV FISTULA;  Surgeon: CARLI Thomason II, MD;  Location: [facility] OR [address] [place] [address];  Service: Vascular;  Laterality: Left;  Ligation of side branches    TONSILLECTOMY       Social History     Occupational History    Not on file   Tobacco Use    Smoking status: Never Smoker    Smokeless tobacco: Never Used   Substance and Sexual Activity    Alcohol use: No    Drug use: No    Sexual activity: Not on file       Review of Systems   Constitutional: Negative for weight loss.   HENT: Positive for hoarse voice (chronic). Negative for ear pain and nosebleeds.    Eyes: Negative for discharge and pain.   Cardiovascular: Negative for chest pain and palpitations.   Respiratory: Negative for cough, shortness of breath and wheezing.    Endocrine: Negative for cold intolerance, heat intolerance and polyphagia.   Hematologic/Lymphatic: Negative for adenopathy. Does not bruise/bleed easily.   Skin: Negative for itching and rash.   Musculoskeletal: Negative for joint swelling and muscle cramps.   Gastrointestinal: Negative for abdominal pain, diarrhea, nausea and vomiting.   Genitourinary: Negative for dysuria and flank pain.   Neurological: Negative for numbness and seizures.             II. PHYSICAL EXAM     Physical Exam  Vitals reviewed.   Constitutional:       Appearance: Normal appearance. He is normal weight.   HENT:      Head: Normocephalic and atraumatic.      Comments: hoarse  Eyes:      Extraocular Movements: Extraocular movements intact.      Pupils: Pupils are equal, round, and reactive to light.   Cardiovascular:      Rate and Rhythm: Normal rate.      Comments: Palpable thrill L arm fistula   2+ palpable radial pulses bilaterally    Pulmonary:      Effort: Pulmonary effort is normal.      Comments: On home oxygen 2L  Abdominal:      General: Abdomen is flat.      Palpations: Abdomen is soft.   Musculoskeletal:         General: Normal range of motion.      Cervical back: Normal range of motion.   Skin:      General: Skin is warm and dry.   Neurological:      General: No focal deficit present.      Mental Status: He is alert. Mental status is at baseline.      Cranial Nerves: Cranial nerve deficit (chronic hoarseness) present.                 III. ASSESSMENT & PLAN (MEDICAL DECISION MAKING)     1. ESRD on hemodialysis    2. Arteriovenous fistula for hemodialysis in place, primary        Imaging Results:  (I have personally reviewed the imaging and provided my interpretation below)      LUE AVF U/S 4/25/22: Volume flow improved to 766cm/s. PSV elevation >400cm/s at AC fossa suggestive of stenosis. No other evidence of stenosis.    LUE AVF U/S 7/25/22: volume flow >900ml/min. Multiple velocity elevations suggestive of stenosis.    Assessment/Diagnosis and Plan:  61 y.o. male with left radiocephalic AVF s/p ligation of side branches and PTA of mid-forearm stenosis.  Patient is doing well with and dialyzing through his AV fistula without issues and volume flow has only improved. Would recommend continued use of the fistula. No indication for intervention unless he has problems with HD.      -RTC 6 months for surveillance u/s of AVF. Call sooner if problems with HD    CARLI Thomason II, MD, [facility]  Vascular Surgery  [facility]

## 2022-08-01 ENCOUNTER — OFFICE VISIT (OUTPATIENT)
Dept: OPHTHALMOLOGY | Facility: CLINIC | Age: 62
End: 2022-08-01
Payer: MEDICARE

## 2022-08-01 ENCOUNTER — PATIENT MESSAGE (OUTPATIENT)
Dept: OPHTHALMOLOGY | Facility: CLINIC | Age: 62
End: 2022-08-01
Payer: MEDICARE

## 2022-08-01 DIAGNOSIS — E11.9 DIABETES MELLITUS TYPE 2 WITHOUT RETINOPATHY: Primary | ICD-10-CM

## 2022-08-01 DIAGNOSIS — H52.7 REFRACTIVE ERRORS: ICD-10-CM

## 2022-08-01 DIAGNOSIS — E11.36 DIABETIC CATARACT: ICD-10-CM

## 2022-08-01 PROCEDURE — 92015 PR REFRACTION: ICD-10-PCS | Mod: S$GLB,,, | Performed by: OPTOMETRIST

## 2022-08-01 PROCEDURE — 1159F PR MEDICATION LIST DOCUMENTED IN MEDICAL RECORD: ICD-10-PCS | Mod: CPTII,S$GLB,, | Performed by: OPTOMETRIST

## 2022-08-01 PROCEDURE — 3051F HG A1C>EQUAL 7.0%<8.0%: CPT | Mod: CPTII,S$GLB,, | Performed by: OPTOMETRIST

## 2022-08-01 PROCEDURE — 4010F ACE/ARB THERAPY RXD/TAKEN: CPT | Mod: CPTII,S$GLB,, | Performed by: OPTOMETRIST

## 2022-08-01 PROCEDURE — 1159F MED LIST DOCD IN RCRD: CPT | Mod: CPTII,S$GLB,, | Performed by: OPTOMETRIST

## 2022-08-01 PROCEDURE — 2023F PR DILATED RETINAL EXAM W/O EVID OF RETINOPATHY: ICD-10-PCS | Mod: CPTII,S$GLB,, | Performed by: OPTOMETRIST

## 2022-08-01 PROCEDURE — 2023F DILAT RTA XM W/O RTNOPTHY: CPT | Mod: CPTII,S$GLB,, | Performed by: OPTOMETRIST

## 2022-08-01 PROCEDURE — 3051F PR MOST RECENT HEMOGLOBIN A1C LEVEL 7.0 - < 8.0%: ICD-10-PCS | Mod: CPTII,S$GLB,, | Performed by: OPTOMETRIST

## 2022-08-01 PROCEDURE — 4010F PR ACE/ARB THEARPY RXD/TAKEN: ICD-10-PCS | Mod: CPTII,S$GLB,, | Performed by: OPTOMETRIST

## 2022-08-01 PROCEDURE — 92014 COMPRE OPH EXAM EST PT 1/>: CPT | Mod: S$GLB,,, | Performed by: OPTOMETRIST

## 2022-08-01 PROCEDURE — 92015 DETERMINE REFRACTIVE STATE: CPT | Mod: S$GLB,,, | Performed by: OPTOMETRIST

## 2022-08-01 PROCEDURE — 92014 PR EYE EXAM, EST PATIENT,COMPREHESV: ICD-10-PCS | Mod: S$GLB,,, | Performed by: OPTOMETRIST

## 2022-08-01 NOTE — PROGRESS NOTES
HPI     Diabetic Eye Exam      Additional comments: Lab Results       Component                Value               Date                       HGBA1C                   7.2 (H)             03/14/2022                            Comments     No complaints.  Vision unchanged since last year.          Last edited by Beth Ortiz MA on 8/1/2022  8:47 AM. (History)            Assessment /Plan     For exam results, see Encounter Report.    Diabetes mellitus type 2 without retinopathy    Diabetic cataract    Refractive errors      No Background Diabetic Retinopathy    Mild cataracts OU, not surgical.    Dispense Final Rx for glasses.  RTC 1 year  Discussed above and answered questions.

## 2022-08-11 ENCOUNTER — PES CALL (OUTPATIENT)
Dept: ADMINISTRATIVE | Facility: CLINIC | Age: 62
End: 2022-08-11
Payer: MEDICARE

## 2022-08-11 DIAGNOSIS — N18.30 CHRONIC KIDNEY DISEASE (CKD), STAGE III (MODERATE): ICD-10-CM

## 2022-08-11 RX ORDER — FUROSEMIDE 40 MG/1
TABLET ORAL
Qty: 90 TABLET | Refills: 3 | Status: SHIPPED | OUTPATIENT
Start: 2022-08-11 | End: 2023-01-01 | Stop reason: SDUPTHER

## 2022-08-12 ENCOUNTER — TELEPHONE (OUTPATIENT)
Dept: ADMINISTRATIVE | Facility: CLINIC | Age: 62
End: 2022-08-12
Payer: MEDICARE

## 2022-08-12 NOTE — TELEPHONE ENCOUNTER
Called pt, informed pt I was calling to remind pt of his in office EAWV on 8/15/22; clinic location provided to patient; pt confirmed appointment

## 2022-08-14 ENCOUNTER — TELEPHONE (OUTPATIENT)
Dept: NEPHROLOGY | Facility: CLINIC | Age: 62
End: 2022-08-14
Payer: MEDICARE

## 2022-08-14 DIAGNOSIS — R01.1 SYSTOLIC MURMUR: ICD-10-CM

## 2022-08-14 DIAGNOSIS — N18.6 ESRD (END STAGE RENAL DISEASE): Primary | ICD-10-CM

## 2022-08-14 NOTE — TELEPHONE ENCOUNTER
Please schedule a 2D Echo/TTE for pt in [place], order in. He is a TTS pt at [facility], first shift. ty

## 2022-08-15 ENCOUNTER — OFFICE VISIT (OUTPATIENT)
Dept: FAMILY MEDICINE | Facility: CLINIC | Age: 62
End: 2022-08-15
Payer: MEDICARE

## 2022-08-15 VITALS
DIASTOLIC BLOOD PRESSURE: 91 MMHG | HEIGHT: 71 IN | BODY MASS INDEX: 28.94 KG/M2 | HEART RATE: 91 BPM | WEIGHT: 206.69 LBS | TEMPERATURE: 98 F | SYSTOLIC BLOOD PRESSURE: 159 MMHG

## 2022-08-15 DIAGNOSIS — Z86.16 HISTORY OF COVID-19: ICD-10-CM

## 2022-08-15 DIAGNOSIS — E11.65 TYPE 2 DIABETES MELLITUS WITH HYPERGLYCEMIA, WITH LONG-TERM CURRENT USE OF INSULIN: Chronic | ICD-10-CM

## 2022-08-15 DIAGNOSIS — K21.9 GASTROESOPHAGEAL REFLUX DISEASE WITHOUT ESOPHAGITIS: ICD-10-CM

## 2022-08-15 DIAGNOSIS — I27.9 CHRONIC PULMONARY HEART DISEASE: ICD-10-CM

## 2022-08-15 DIAGNOSIS — Z00.00 ENCOUNTER FOR PREVENTIVE CARE: Primary | ICD-10-CM

## 2022-08-15 DIAGNOSIS — N18.9 ANEMIA DUE TO CHRONIC KIDNEY DISEASE, UNSPECIFIED CKD STAGE: ICD-10-CM

## 2022-08-15 DIAGNOSIS — G47.33 OSA ON CPAP: ICD-10-CM

## 2022-08-15 DIAGNOSIS — I48.0 PAROXYSMAL ATRIAL FIBRILLATION: ICD-10-CM

## 2022-08-15 DIAGNOSIS — Z99.2 ESRD ON HEMODIALYSIS: ICD-10-CM

## 2022-08-15 DIAGNOSIS — E78.2 COMBINED HYPERLIPIDEMIA ASSOCIATED WITH TYPE 2 DIABETES MELLITUS: Chronic | ICD-10-CM

## 2022-08-15 DIAGNOSIS — E11.22 CKD STAGE 5 DUE TO TYPE 2 DIABETES MELLITUS: Chronic | ICD-10-CM

## 2022-08-15 DIAGNOSIS — D86.9 SARCOIDOSIS: ICD-10-CM

## 2022-08-15 DIAGNOSIS — T86.810 ANTIBODY MEDIATED REJECTION OF LUNG TRANSPLANT: ICD-10-CM

## 2022-08-15 DIAGNOSIS — T86.810 ACUTE REJECTION OF LUNG TRANSPLANT: ICD-10-CM

## 2022-08-15 DIAGNOSIS — D84.9 IMMUNOSUPPRESSION: ICD-10-CM

## 2022-08-15 DIAGNOSIS — Z79.2 PROPHYLACTIC ANTIBIOTIC: ICD-10-CM

## 2022-08-15 DIAGNOSIS — Z94.2 LUNG REPLACED BY TRANSPLANT: ICD-10-CM

## 2022-08-15 DIAGNOSIS — N18.6 ESRD ON HEMODIALYSIS: ICD-10-CM

## 2022-08-15 DIAGNOSIS — D86.9 PULMONARY HYPERTENSION ASSOCIATED WITH SARCOIDOSIS: ICD-10-CM

## 2022-08-15 DIAGNOSIS — T86.819 COMPLICATION OF TRANSPLANTED LUNG, UNSPECIFIED COMPLICATION: ICD-10-CM

## 2022-08-15 DIAGNOSIS — Z87.19 HISTORY OF PANCREATITIS: ICD-10-CM

## 2022-08-15 DIAGNOSIS — N18.5 CKD STAGE 5 DUE TO TYPE 2 DIABETES MELLITUS: Chronic | ICD-10-CM

## 2022-08-15 DIAGNOSIS — R74.01 TRANSAMINITIS: ICD-10-CM

## 2022-08-15 DIAGNOSIS — I15.2 HYPERTENSION ASSOCIATED WITH DIABETES: Chronic | ICD-10-CM

## 2022-08-15 DIAGNOSIS — Z79.899 ENCOUNTER FOR LONG-TERM (CURRENT) USE OF MEDICATIONS: Chronic | ICD-10-CM

## 2022-08-15 DIAGNOSIS — E11.59 HYPERTENSION ASSOCIATED WITH DIABETES: Chronic | ICD-10-CM

## 2022-08-15 DIAGNOSIS — Z79.4 TYPE 2 DIABETES MELLITUS WITH HYPERGLYCEMIA, WITH LONG-TERM CURRENT USE OF INSULIN: Chronic | ICD-10-CM

## 2022-08-15 DIAGNOSIS — J96.11 CHRONIC RESPIRATORY FAILURE WITH HYPOXIA: ICD-10-CM

## 2022-08-15 DIAGNOSIS — I27.29 PULMONARY HYPERTENSION ASSOCIATED WITH SARCOIDOSIS: ICD-10-CM

## 2022-08-15 DIAGNOSIS — D63.1 ANEMIA DUE TO CHRONIC KIDNEY DISEASE, UNSPECIFIED CKD STAGE: ICD-10-CM

## 2022-08-15 DIAGNOSIS — E11.69 COMBINED HYPERLIPIDEMIA ASSOCIATED WITH TYPE 2 DIABETES MELLITUS: Chronic | ICD-10-CM

## 2022-08-15 PROCEDURE — 3080F PR MOST RECENT DIASTOLIC BLOOD PRESSURE >= 90 MM HG: ICD-10-PCS | Mod: CPTII,S$GLB,, | Performed by: NURSE PRACTITIONER

## 2022-08-15 PROCEDURE — 3008F BODY MASS INDEX DOCD: CPT | Mod: CPTII,S$GLB,, | Performed by: NURSE PRACTITIONER

## 2022-08-15 PROCEDURE — G0439 PR MEDICARE ANNUAL WELLNESS SUBSEQUENT VISIT: ICD-10-PCS | Mod: S$GLB,,, | Performed by: NURSE PRACTITIONER

## 2022-08-15 PROCEDURE — 3008F PR BODY MASS INDEX (BMI) DOCUMENTED: ICD-10-PCS | Mod: CPTII,S$GLB,, | Performed by: NURSE PRACTITIONER

## 2022-08-15 PROCEDURE — 3077F PR MOST RECENT SYSTOLIC BLOOD PRESSURE >= 140 MM HG: ICD-10-PCS | Mod: CPTII,S$GLB,, | Performed by: NURSE PRACTITIONER

## 2022-08-15 PROCEDURE — 99499 UNLISTED E&M SERVICE: CPT | Mod: HCNC,S$GLB,, | Performed by: NURSE PRACTITIONER

## 2022-08-15 PROCEDURE — G9920 SCRNING PERF AND NEGATIVE: HCPCS | Mod: CPTII,S$GLB,, | Performed by: NURSE PRACTITIONER

## 2022-08-15 PROCEDURE — G0439 PPPS, SUBSEQ VISIT: HCPCS | Mod: S$GLB,,, | Performed by: NURSE PRACTITIONER

## 2022-08-15 PROCEDURE — 3051F PR MOST RECENT HEMOGLOBIN A1C LEVEL 7.0 - < 8.0%: ICD-10-PCS | Mod: CPTII,S$GLB,, | Performed by: NURSE PRACTITIONER

## 2022-08-15 PROCEDURE — 99999 PR PBB SHADOW E&M-EST. PATIENT-LVL III: CPT | Mod: PBBFAC,,, | Performed by: NURSE PRACTITIONER

## 2022-08-15 PROCEDURE — 3072F PR LOW RISK FOR RETINOPATHY: ICD-10-PCS | Mod: CPTII,S$GLB,, | Performed by: NURSE PRACTITIONER

## 2022-08-15 PROCEDURE — 3051F HG A1C>EQUAL 7.0%<8.0%: CPT | Mod: CPTII,S$GLB,, | Performed by: NURSE PRACTITIONER

## 2022-08-15 PROCEDURE — 4010F ACE/ARB THERAPY RXD/TAKEN: CPT | Mod: CPTII,S$GLB,, | Performed by: NURSE PRACTITIONER

## 2022-08-15 PROCEDURE — 1159F MED LIST DOCD IN RCRD: CPT | Mod: CPTII,S$GLB,, | Performed by: NURSE PRACTITIONER

## 2022-08-15 PROCEDURE — 99999 PR PBB SHADOW E&M-EST. PATIENT-LVL III: ICD-10-PCS | Mod: PBBFAC,,, | Performed by: NURSE PRACTITIONER

## 2022-08-15 PROCEDURE — 3072F LOW RISK FOR RETINOPATHY: CPT | Mod: CPTII,S$GLB,, | Performed by: NURSE PRACTITIONER

## 2022-08-15 PROCEDURE — 1159F PR MEDICATION LIST DOCUMENTED IN MEDICAL RECORD: ICD-10-PCS | Mod: CPTII,S$GLB,, | Performed by: NURSE PRACTITIONER

## 2022-08-15 PROCEDURE — 3077F SYST BP >= 140 MM HG: CPT | Mod: CPTII,S$GLB,, | Performed by: NURSE PRACTITIONER

## 2022-08-15 PROCEDURE — G9920 PR SCREENING AND NEGATIVE: ICD-10-PCS | Mod: CPTII,S$GLB,, | Performed by: NURSE PRACTITIONER

## 2022-08-15 PROCEDURE — 3080F DIAST BP >= 90 MM HG: CPT | Mod: CPTII,S$GLB,, | Performed by: NURSE PRACTITIONER

## 2022-08-15 PROCEDURE — 4010F PR ACE/ARB THEARPY RXD/TAKEN: ICD-10-PCS | Mod: CPTII,S$GLB,, | Performed by: NURSE PRACTITIONER

## 2022-08-15 PROCEDURE — 99499 RISK ADDL DX/OHS AUDIT: ICD-10-PCS | Mod: HCNC,S$GLB,, | Performed by: NURSE PRACTITIONER

## 2022-08-17 NOTE — PROGRESS NOTES
"  Martin Hendrix presented for a follow-up Medicare AWV today. The following components were reviewed and updated:    · Medical history  · Family History  · Social history  · Allergies and Current Medications  · Health Risk Assessment  · Health Maintenance  · Care Team    **See Completed Assessments for Annual Wellness visit with in the encounter summary    The following assessments were completed:  · Depression Screening  · Cognitive function Screening  · Timed Get Up Test  · Whisper Test  · PHQ-2  · Nutrition screen  · ADL  · PAQ  · Osteoporosis risk  · CAGE  · Living situation      Vitals:    08/15/22 1350   BP: (!) 159/91   Pulse: 91   Temp: 98.3 °F (36.8 °C)   TempSrc: Oral   Weight: 93.8 kg (206 lb 11.2 oz)   Height: 5' 11" (1.803 m)     Body mass index is 28.83 kg/m².   ]        Diagnoses and health risks identified today and associated recommendations/orders:  1. Encounter for preventive care  Stable  Up to date    2. Lung replaced by transplant  Stable  Managed by pulmonology/ transplant specialist  Continue current medications, plan of care  F/U with specialists as advised    3. Pulmonary hypertension associated with sarcoidosis  Stable  Managed by pulmonology/ transplant specialist  Continue current medications, plan of care  F/U with specialists as advised    4. Paroxysmal atrial fibrillation  Stable  Managed by cardiology, PCP  Continue current medications, plan of care  F/U with PCP as advised    5. CKD stage 5 due to type 2 diabetes mellitus  Stable  Managed by nephrology  Continue current medications, plan of care  F/U with PCP as advised    6. ESRD on hemodialysis  Stable  Managed by nephrology  Continue current medications, plan of care  F/U with PCP as advised    7. Type 2 diabetes mellitus with hyperglycemia, with long-term current use of insulin  Stable   Managed by PCP  Continue current medications, plan of care  F/U with PCP as advised    8. Chronic pulmonary heart disease  Stable  Managed by " pulmonology/ transplant specialist  Continue current medications, plan of care  F/U with specialists as advised    9. Hypertension associated with diabetes  Stable  Managed by PCP  Low sodium diet recommended  Continue current medications, plan of care  F/U with PCP as advised    10. Combined hyperlipidemia associated with type 2 diabetes mellitus  Stable  Managed by PCP  Low cholesterol diet recommended  Continue current medications, plan of care  F/U with PCP as advised    11. Sarcoidosis  Stable  Managed by pulmonology/ transplant specialist  Continue current medications, plan of care  F/U with specialists as advised    12. Immunosuppression  Stable  Managed by transplant specialist  Continue current medications, plan of care  F/U with specialists as advised    13. Complication of transplanted lung, unspecified complication  Resolved  Managed by transplant specialists  Continue current medications, plan of care  F/U with specialist as advised    14. Acute rejection of lung transplant  Resolved  Managed by transplant specialists  Continue current medications, plan of care  F/U with specialist as advised    15. Antibody mediated rejection of lung transplant  Stable  Managed by transplant specialists  Continue current medications, plan of care  F/U with specialist as advised    16. KRISTYN on CPAP  Stable  Managed by pulmonology/ transplant specialist  Continue current medications, plan of care  F/U with specialists as advised    17. Chronic respiratory failure with hypoxia  Stable  Managed by pulmonology/ transplant specialist  Continue current medications, plan of care  F/U with specialists as advised  18. Gastroesophageal reflux disease without esophagitis  Stable  Managed by PCP  Avoid GERD inducing foods, beverages  Continue current medications, plan of care  F/U with PCP as needed    19. Transaminitis  Stable  Managed by PCP  Consider hepatology if worsening  Continue current medications, plan of care  F/U with  PCP as needed    20. Anemia due to chronic kidney disease, unspecified CKD stage  Stable  Managed by nephrology; sees monthly  Continue current medications, plan of care  F/U with specialist as advised    21. History of pancreatitis  Stable  F/U with endocrinology as needed  Continue current medications, plan of care    22. Prophylactic antibiotic  Stable  Managed by transplant specialists  Continue current medications, plan of care  F/U with specialist as advised    23. History of COVID-19  Resolved  F/U with PCP as needed    24. Encounter for long-term (current) use of medications  Stable  Continue current medications, plan of care      I offered to discuss end of life issues, including information on how to make advance directives that the patient could use to name someone who would make medical decisions on their behalf if they became too ill to make themselves.    ___Patient declined - already done.    _x__Patient is interested, I provided paperwork and offered to discuss  Fab Salazar with a 5-10 year written screening schedule and personal prevention plan. Recommendations were developed using the USPSTF age appropriate recommendations. Education, counseling, and referrals were provided as needed.  After Visit Summary printed and given to patient which includes a list of additional screenings\tests needed.    No follow-ups on file.      Henna Rodriguez NP

## 2022-08-22 ENCOUNTER — HOSPITAL ENCOUNTER (OUTPATIENT)
Dept: CARDIOLOGY | Facility: HOSPITAL | Age: 62
Discharge: HOME | End: 2022-08-22
Attending: INTERNAL MEDICINE
Payer: MEDICARE

## 2022-08-22 VITALS
BODY MASS INDEX: 28.84 KG/M2 | HEART RATE: 97 BPM | DIASTOLIC BLOOD PRESSURE: 91 MMHG | WEIGHT: 206 LBS | SYSTOLIC BLOOD PRESSURE: 159 MMHG | HEIGHT: 71 IN

## 2022-08-22 DIAGNOSIS — N18.6 ESRD (END STAGE RENAL DISEASE): ICD-10-CM

## 2022-08-22 DIAGNOSIS — R01.1 SYSTOLIC MURMUR: ICD-10-CM

## 2022-08-22 LAB
AORTIC ROOT ANNULUS: 3.74 CM
ASCENDING AORTA: 2.84 CM
AV INDEX (PROSTH): 0.8
AV MEAN GRADIENT: 3 MMHG
AV PEAK GRADIENT: 5 MMHG
AV VALVE AREA: 3.48 CM2
AV VELOCITY RATIO: 0.81
BSA FOR ECHO PROCEDURE: 2.16 M2
CV ECHO LV RWT: 0.5 CM
DOP CALC AO PEAK VEL: 1.09 M/S
DOP CALC AO VTI: 22.3 CM
DOP CALC LVOT AREA: 4.3 CM2
DOP CALC LVOT DIAMETER: 2.35 CM
DOP CALC LVOT PEAK VEL: 0.88 M/S
DOP CALC LVOT STROKE VOLUME: 77.6 CM3
DOP CALC RVOT PEAK VEL: 0.84 M/S
DOP CALC RVOT VTI: 17.9 CM
DOP CALCLVOT PEAK VEL VTI: 17.9 CM
E WAVE DECELERATION TIME: 200 MSEC
E/A RATIO: 0.48
E/E' RATIO: 5.71 M/S
ECHO LV POSTERIOR WALL: 1.13 CM (ref 0.6–1.1)
EJECTION FRACTION: 55 %
FRACTIONAL SHORTENING: 29 % (ref 28–44)
INTERVENTRICULAR SEPTUM: 1.15 CM (ref 0.6–1.1)
IVRT: 117.03 MSEC
LA MAJOR: 5.77 CM
LA MINOR: 6.78 CM
LA WIDTH: 3.8 CM
LEFT ATRIUM SIZE: 4.46 CM
LEFT ATRIUM VOLUME INDEX MOD: 35.8 ML/M2
LEFT ATRIUM VOLUME INDEX: 42 ML/M2
LEFT ATRIUM VOLUME MOD: 76.69 CM3
LEFT ATRIUM VOLUME: 89.81 CM3
LEFT INTERNAL DIMENSION IN SYSTOLE: 3.19 CM (ref 2.1–4)
LEFT VENTRICLE DIASTOLIC VOLUME INDEX: 42.74 ML/M2
LEFT VENTRICLE DIASTOLIC VOLUME: 91.47 ML
LEFT VENTRICLE MASS INDEX: 85 G/M2
LEFT VENTRICLE SYSTOLIC VOLUME INDEX: 19 ML/M2
LEFT VENTRICLE SYSTOLIC VOLUME: 40.76 ML
LEFT VENTRICULAR INTERNAL DIMENSION IN DIASTOLE: 4.48 CM (ref 3.5–6)
LEFT VENTRICULAR MASS: 182.81 G
LV LATERAL E/E' RATIO: 5 M/S
LV SEPTAL E/E' RATIO: 6.67 M/S
LVOT MG: 1.94 MMHG
LVOT MV: 0.66 CM/S
MV PEAK A VEL: 0.84 M/S
MV PEAK E VEL: 0.4 M/S
MV STENOSIS PRESSURE HALF TIME: 58 MS
MV VALVE AREA P 1/2 METHOD: 3.79 CM2
PISA TR MAX VEL: 1.65 M/S
PULM VEIN S/D RATIO: 1.22
PV MEAN GRADIENT: 1.77 MMHG
PV PEAK D VEL: 0.49 M/S
PV PEAK S VEL: 0.6 M/S
PV PEAK VELOCITY: 0.99 CM/S
RA MAJOR: 4.52 CM
RA WIDTH: 2.49 CM
RIGHT VENTRICULAR END-DIASTOLIC DIMENSION: 2.15 CM
SINUS: 3.41 CM
STJ: 3.25 CM
TDI LATERAL: 0.08 M/S
TDI SEPTAL: 0.06 M/S
TDI: 0.07 M/S
TR MAX PG: 11 MMHG
TRICUSPID ANNULAR PLANE SYSTOLIC EXCURSION: 1.43 CM

## 2022-08-22 PROCEDURE — 93306 TTE W/DOPPLER COMPLETE: CPT | Mod: PO

## 2022-08-22 PROCEDURE — 93306 ECHO (CUPID ONLY): ICD-10-PCS | Mod: 26,,, | Performed by: INTERNAL MEDICINE

## 2022-08-22 PROCEDURE — 93306 TTE W/DOPPLER COMPLETE: CPT | Mod: 26,,, | Performed by: INTERNAL MEDICINE

## 2022-09-01 ENCOUNTER — PATIENT MESSAGE (OUTPATIENT)
Dept: NEPHROLOGY | Facility: CLINIC | Age: 62
End: 2022-09-01
Payer: MEDICARE

## 2022-09-10 ENCOUNTER — PATIENT MESSAGE (OUTPATIENT)
Dept: NEPHROLOGY | Facility: CLINIC | Age: 62
End: 2022-09-10
Payer: MEDICARE

## 2022-09-14 ENCOUNTER — OFFICE VISIT (OUTPATIENT)
Dept: FAMILY MEDICINE | Facility: CLINIC | Age: 62
End: 2022-09-14
Payer: MEDICARE

## 2022-09-14 VITALS
WEIGHT: 198 LBS | DIASTOLIC BLOOD PRESSURE: 70 MMHG | HEIGHT: 71 IN | OXYGEN SATURATION: 99 % | TEMPERATURE: 97 F | SYSTOLIC BLOOD PRESSURE: 112 MMHG | HEART RATE: 95 BPM | BODY MASS INDEX: 27.72 KG/M2

## 2022-09-14 DIAGNOSIS — Z79.899 ENCOUNTER FOR LONG-TERM (CURRENT) USE OF MEDICATIONS: ICD-10-CM

## 2022-09-14 DIAGNOSIS — Z00.00 WELL ADULT EXAM: Primary | ICD-10-CM

## 2022-09-14 DIAGNOSIS — Z13.6 ENCOUNTER FOR LIPID SCREENING FOR CARDIOVASCULAR DISEASE: ICD-10-CM

## 2022-09-14 DIAGNOSIS — N25.81 SECONDARY HYPERPARATHYROIDISM OF RENAL ORIGIN: ICD-10-CM

## 2022-09-14 DIAGNOSIS — Z12.11 COLON CANCER SCREENING: ICD-10-CM

## 2022-09-14 DIAGNOSIS — Z94.2 LUNG REPLACED BY TRANSPLANT: ICD-10-CM

## 2022-09-14 DIAGNOSIS — N18.6 ESRD ON HEMODIALYSIS: ICD-10-CM

## 2022-09-14 DIAGNOSIS — E11.22 CKD STAGE 5 DUE TO TYPE 2 DIABETES MELLITUS: Chronic | ICD-10-CM

## 2022-09-14 DIAGNOSIS — N18.5 CKD STAGE 5 DUE TO TYPE 2 DIABETES MELLITUS: Chronic | ICD-10-CM

## 2022-09-14 DIAGNOSIS — Z23 FLU VACCINE NEED: ICD-10-CM

## 2022-09-14 DIAGNOSIS — Z99.2 ESRD ON HEMODIALYSIS: ICD-10-CM

## 2022-09-14 DIAGNOSIS — Z13.220 ENCOUNTER FOR LIPID SCREENING FOR CARDIOVASCULAR DISEASE: ICD-10-CM

## 2022-09-14 PROBLEM — D84.9 IMMUNOCOMPROMISED STATE: Status: ACTIVE | Noted: 2021-09-23

## 2022-09-14 PROBLEM — Z79.4 LONG TERM CURRENT USE OF INSULIN: Status: ACTIVE | Noted: 2021-09-23

## 2022-09-14 PROBLEM — G47.33 OBSTRUCTIVE SLEEP APNEA SYNDROME: Status: ACTIVE | Noted: 2022-09-14

## 2022-09-14 PROBLEM — J44.9 CHRONIC OBSTRUCTIVE PULMONARY DISEASE: Status: ACTIVE | Noted: 2022-09-14

## 2022-09-14 PROBLEM — D22.9 MULTIPLE BENIGN MELANOCYTIC NEVI: Status: ACTIVE | Noted: 2022-09-14

## 2022-09-14 PROBLEM — Z99.81 OXYGEN DEPENDENT: Status: ACTIVE | Noted: 2021-09-23

## 2022-09-14 PROBLEM — Z86.16 HISTORY OF SEVERE ACUTE RESPIRATORY SYNDROME CORONAVIRUS 2 (SARS-COV-2) DISEASE: Status: ACTIVE | Noted: 2022-09-14

## 2022-09-14 PROBLEM — I12.0: Status: ACTIVE | Noted: 2021-09-23

## 2022-09-14 PROCEDURE — 3072F PR LOW RISK FOR RETINOPATHY: ICD-10-PCS | Mod: CPTII,S$GLB,, | Performed by: FAMILY MEDICINE

## 2022-09-14 PROCEDURE — 90686 FLU VACCINE (QUAD) GREATER THAN OR EQUAL TO 3YO PRESERVATIVE FREE IM: ICD-10-PCS | Mod: S$GLB,,, | Performed by: FAMILY MEDICINE

## 2022-09-14 PROCEDURE — 3051F PR MOST RECENT HEMOGLOBIN A1C LEVEL 7.0 - < 8.0%: ICD-10-PCS | Mod: CPTII,S$GLB,, | Performed by: FAMILY MEDICINE

## 2022-09-14 PROCEDURE — 3078F PR MOST RECENT DIASTOLIC BLOOD PRESSURE < 80 MM HG: ICD-10-PCS | Mod: CPTII,S$GLB,, | Performed by: FAMILY MEDICINE

## 2022-09-14 PROCEDURE — 3008F PR BODY MASS INDEX (BMI) DOCUMENTED: ICD-10-PCS | Mod: CPTII,S$GLB,, | Performed by: FAMILY MEDICINE

## 2022-09-14 PROCEDURE — 4010F ACE/ARB THERAPY RXD/TAKEN: CPT | Mod: CPTII,S$GLB,, | Performed by: FAMILY MEDICINE

## 2022-09-14 PROCEDURE — 99396 PREV VISIT EST AGE 40-64: CPT | Mod: 25,S$GLB,, | Performed by: FAMILY MEDICINE

## 2022-09-14 PROCEDURE — 90686 IIV4 VACC NO PRSV 0.5 ML IM: CPT | Mod: S$GLB,,, | Performed by: FAMILY MEDICINE

## 2022-09-14 PROCEDURE — 3008F BODY MASS INDEX DOCD: CPT | Mod: CPTII,S$GLB,, | Performed by: FAMILY MEDICINE

## 2022-09-14 PROCEDURE — 1159F PR MEDICATION LIST DOCUMENTED IN MEDICAL RECORD: ICD-10-PCS | Mod: CPTII,S$GLB,, | Performed by: FAMILY MEDICINE

## 2022-09-14 PROCEDURE — 3074F PR MOST RECENT SYSTOLIC BLOOD PRESSURE < 130 MM HG: ICD-10-PCS | Mod: CPTII,S$GLB,, | Performed by: FAMILY MEDICINE

## 2022-09-14 PROCEDURE — 99396 PR PREVENTIVE VISIT,EST,40-64: ICD-10-PCS | Mod: 25,S$GLB,, | Performed by: FAMILY MEDICINE

## 2022-09-14 PROCEDURE — 3074F SYST BP LT 130 MM HG: CPT | Mod: CPTII,S$GLB,, | Performed by: FAMILY MEDICINE

## 2022-09-14 PROCEDURE — 1159F MED LIST DOCD IN RCRD: CPT | Mod: CPTII,S$GLB,, | Performed by: FAMILY MEDICINE

## 2022-09-14 PROCEDURE — 99999 PR PBB SHADOW E&M-EST. PATIENT-LVL IV: CPT | Mod: PBBFAC,,, | Performed by: FAMILY MEDICINE

## 2022-09-14 PROCEDURE — 3072F LOW RISK FOR RETINOPATHY: CPT | Mod: CPTII,S$GLB,, | Performed by: FAMILY MEDICINE

## 2022-09-14 PROCEDURE — 3051F HG A1C>EQUAL 7.0%<8.0%: CPT | Mod: CPTII,S$GLB,, | Performed by: FAMILY MEDICINE

## 2022-09-14 PROCEDURE — G0008 FLU VACCINE (QUAD) GREATER THAN OR EQUAL TO 3YO PRESERVATIVE FREE IM: ICD-10-PCS | Mod: S$GLB,,, | Performed by: FAMILY MEDICINE

## 2022-09-14 PROCEDURE — 4010F PR ACE/ARB THEARPY RXD/TAKEN: ICD-10-PCS | Mod: CPTII,S$GLB,, | Performed by: FAMILY MEDICINE

## 2022-09-14 PROCEDURE — 99999 PR PBB SHADOW E&M-EST. PATIENT-LVL IV: ICD-10-PCS | Mod: PBBFAC,,, | Performed by: FAMILY MEDICINE

## 2022-09-14 PROCEDURE — G0008 ADMIN INFLUENZA VIRUS VAC: HCPCS | Mod: S$GLB,,, | Performed by: FAMILY MEDICINE

## 2022-09-14 PROCEDURE — 3078F DIAST BP <80 MM HG: CPT | Mod: CPTII,S$GLB,, | Performed by: FAMILY MEDICINE
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
## 2022-09-15 ENCOUNTER — HOSPITAL ENCOUNTER (OUTPATIENT)
Dept: PREADMISSION TESTING | Facility: HOSPITAL | Age: 62
Discharge: HOME | End: 2022-09-15
Attending: FAMILY MEDICINE
Payer: MEDICARE

## 2022-09-15 ENCOUNTER — TELEPHONE (OUTPATIENT)
Dept: PREADMISSION TESTING | Facility: HOSPITAL | Age: 62
End: 2022-09-15
Payer: MEDICARE

## 2022-09-15 DIAGNOSIS — Z12.11 COLON CANCER SCREENING: ICD-10-CM

## 2022-09-15 DIAGNOSIS — Z01.818 PREOP TESTING: Primary | ICD-10-CM

## 2022-09-15 NOTE — PATIENT INSTRUCTIONS
Follow up in about 1 year (around 9/14/2023), or if symptoms worsen or fail to improve, for Annual Wellness Exam.     Dear patient,   As a result of recent federal legislation (The Federal Cures Act), you may receive lab or pathology results from your visit in your [facility] account before your physician is able to contact you. Your physician or their representative will relay the results to you with their recommendations at their soonest availability.     If no improvement in symptoms or symptoms worsen, please be advised to call MD, follow-up at clinic and/or go to ER if becomes severe.    Michel Henley M.D.        We Offer TELEHEALTH & Same Day Appointments!   Book your Telehealth appointment with me through my nurse or   Clinic appointments on [facility]!    [address] [place] [address]    Office: [phone]   FAX: [phone]    Check out my Facebook Page and Follow Me at: [email or web address]    Check out my website at [facility] by clicking on: [email or web address]    To Schedule appointments online, go to [facility]: [email or web address]

## 2022-09-15 NOTE — TELEPHONE ENCOUNTER
The patient is scheduled for a colonoscopy on 11/2/22. Is the patient cleared to undergo anesthesia for this procedure?  Thanks,  Sherri HARRISON

## 2022-09-19 RX ORDER — POLYETHYLENE GLYCOL 3350, SODIUM SULFATE ANHYDROUS, SODIUM BICARBONATE, SODIUM CHLORIDE, POTASSIUM CHLORIDE 236; 22.74; 6.74; 5.86; 2.97 G/4L; G/4L; G/4L; G/4L; G/4L
4 POWDER, FOR SOLUTION ORAL ONCE
Qty: 4000 ML | Refills: 0 | Status: SHIPPED | OUTPATIENT
Start: 2022-09-19 | End: 2022-09-19

## 2022-10-04 ENCOUNTER — IMMUNIZATION (OUTPATIENT)
Dept: PRIMARY CARE CLINIC | Facility: CLINIC | Age: 62
End: 2022-10-04
Payer: MEDICARE

## 2022-10-04 ENCOUNTER — DOCUMENTATION ONLY (OUTPATIENT)
Dept: TRANSPLANT | Facility: CLINIC | Age: 62
End: 2022-10-04
Payer: MEDICARE

## 2022-10-04 DIAGNOSIS — Z23 NEED FOR VACCINATION: Primary | ICD-10-CM

## 2022-10-04 PROCEDURE — 91312 COVID-19, MRNA, LNP-S, BIVALENT BOOSTER, PF, 30 MCG/0.3 ML DOSE: CPT | Mod: PBBFAC | Performed by: FAMILY MEDICINE

## 2022-10-04 PROCEDURE — 0124A COVID-19, MRNA, LNP-S, BIVALENT BOOSTER, PF, 30 MCG/0.3 ML DOSE: CPT | Mod: CV19,PBBFAC | Performed by: FAMILY MEDICINE

## 2022-10-05 ENCOUNTER — HOSPITAL ENCOUNTER (OUTPATIENT)
Dept: PULMONOLOGY | Facility: HOSPITAL | Age: 62
Discharge: HOME | End: 2022-10-05
Attending: INTERNAL MEDICINE
Payer: MEDICARE

## 2022-10-05 ENCOUNTER — OFFICE VISIT (OUTPATIENT)
Dept: TRANSPLANT | Facility: CLINIC | Age: 62
End: 2022-10-05
Payer: MEDICARE

## 2022-10-05 ENCOUNTER — HOSPITAL ENCOUNTER (OUTPATIENT)
Dept: RADIOLOGY | Facility: HOSPITAL | Age: 62
Discharge: HOME | End: 2022-10-05
Attending: INTERNAL MEDICINE
Payer: MEDICARE

## 2022-10-05 ENCOUNTER — TELEPHONE (OUTPATIENT)
Dept: TRANSPLANT | Facility: CLINIC | Age: 62
End: 2022-10-05

## 2022-10-05 VITALS
BODY MASS INDEX: 28.72 KG/M2 | OXYGEN SATURATION: 96 % | HEIGHT: 70 IN | TEMPERATURE: 98 F | HEART RATE: 106 BPM | RESPIRATION RATE: 20 BRPM | WEIGHT: 200.63 LBS | SYSTOLIC BLOOD PRESSURE: 156 MMHG | DIASTOLIC BLOOD PRESSURE: 92 MMHG

## 2022-10-05 DIAGNOSIS — Z94.2 LUNG REPLACED BY TRANSPLANT: ICD-10-CM

## 2022-10-05 DIAGNOSIS — Z79.2 PROPHYLACTIC ANTIBIOTIC: ICD-10-CM

## 2022-10-05 DIAGNOSIS — Z79.4 TYPE 2 DIABETES MELLITUS WITH HYPERGLYCEMIA, WITH LONG-TERM CURRENT USE OF INSULIN: ICD-10-CM

## 2022-10-05 DIAGNOSIS — E11.65 TYPE 2 DIABETES MELLITUS WITH HYPERGLYCEMIA, WITH LONG-TERM CURRENT USE OF INSULIN: ICD-10-CM

## 2022-10-05 DIAGNOSIS — Z94.2 LUNG REPLACED BY TRANSPLANT: Primary | ICD-10-CM

## 2022-10-05 DIAGNOSIS — N18.6 ESRD (END STAGE RENAL DISEASE): ICD-10-CM

## 2022-10-05 DIAGNOSIS — D84.9 IMMUNOSUPPRESSION: ICD-10-CM

## 2022-10-05 DIAGNOSIS — J96.11 CHRONIC RESPIRATORY FAILURE WITH HYPOXIA: ICD-10-CM

## 2022-10-05 PROCEDURE — 1160F RVW MEDS BY RX/DR IN RCRD: CPT | Mod: CPTII,S$GLB,, | Performed by: PHYSICIAN ASSISTANT

## 2022-10-05 PROCEDURE — 94010 BREATHING CAPACITY TEST: CPT | Mod: 26,,, | Performed by: INTERNAL MEDICINE

## 2022-10-05 PROCEDURE — 99999 PR PBB SHADOW E&M-EST. PATIENT-LVL V: CPT | Mod: PBBFAC,,, | Performed by: PHYSICIAN ASSISTANT

## 2022-10-05 PROCEDURE — 71046 X-RAY EXAM CHEST 2 VIEWS: CPT | Mod: TC

## 2022-10-05 PROCEDURE — 1160F PR REVIEW ALL MEDS BY PRESCRIBER/CLIN PHARMACIST DOCUMENTED: ICD-10-PCS | Mod: CPTII,S$GLB,, | Performed by: PHYSICIAN ASSISTANT

## 2022-10-05 PROCEDURE — 3072F PR LOW RISK FOR RETINOPATHY: ICD-10-PCS | Mod: CPTII,S$GLB,, | Performed by: PHYSICIAN ASSISTANT

## 2022-10-05 PROCEDURE — 99215 PR OFFICE/OUTPT VISIT, EST, LEVL V, 40-54 MIN: ICD-10-PCS | Mod: 25,S$GLB,, | Performed by: PHYSICIAN ASSISTANT

## 2022-10-05 PROCEDURE — 4010F ACE/ARB THERAPY RXD/TAKEN: CPT | Mod: CPTII,S$GLB,, | Performed by: PHYSICIAN ASSISTANT

## 2022-10-05 PROCEDURE — 1159F PR MEDICATION LIST DOCUMENTED IN MEDICAL RECORD: ICD-10-PCS | Mod: CPTII,S$GLB,, | Performed by: PHYSICIAN ASSISTANT

## 2022-10-05 PROCEDURE — 3080F DIAST BP >= 90 MM HG: CPT | Mod: CPTII,S$GLB,, | Performed by: PHYSICIAN ASSISTANT

## 2022-10-05 PROCEDURE — 4010F PR ACE/ARB THEARPY RXD/TAKEN: ICD-10-PCS | Mod: CPTII,S$GLB,, | Performed by: PHYSICIAN ASSISTANT

## 2022-10-05 PROCEDURE — 3008F PR BODY MASS INDEX (BMI) DOCUMENTED: ICD-10-PCS | Mod: CPTII,S$GLB,, | Performed by: PHYSICIAN ASSISTANT

## 2022-10-05 PROCEDURE — 99999 PR PBB SHADOW E&M-EST. PATIENT-LVL V: ICD-10-PCS | Mod: PBBFAC,,, | Performed by: PHYSICIAN ASSISTANT

## 2022-10-05 PROCEDURE — 3008F BODY MASS INDEX DOCD: CPT | Mod: CPTII,S$GLB,, | Performed by: PHYSICIAN ASSISTANT

## 2022-10-05 PROCEDURE — 71046 XR CHEST PA AND LATERAL: ICD-10-PCS | Mod: 26,,, | Performed by: RADIOLOGY

## 2022-10-05 PROCEDURE — 3051F HG A1C>EQUAL 7.0%<8.0%: CPT | Mod: CPTII,S$GLB,, | Performed by: PHYSICIAN ASSISTANT

## 2022-10-05 PROCEDURE — 71046 X-RAY EXAM CHEST 2 VIEWS: CPT | Mod: 26,,, | Performed by: RADIOLOGY

## 2022-10-05 PROCEDURE — 3051F PR MOST RECENT HEMOGLOBIN A1C LEVEL 7.0 - < 8.0%: ICD-10-PCS | Mod: CPTII,S$GLB,, | Performed by: PHYSICIAN ASSISTANT

## 2022-10-05 PROCEDURE — 94010 BREATHING CAPACITY TEST: ICD-10-PCS | Mod: 26,,, | Performed by: INTERNAL MEDICINE

## 2022-10-05 PROCEDURE — 99215 OFFICE O/P EST HI 40 MIN: CPT | Mod: 25,S$GLB,, | Performed by: PHYSICIAN ASSISTANT

## 2022-10-05 PROCEDURE — 3072F LOW RISK FOR RETINOPATHY: CPT | Mod: CPTII,S$GLB,, | Performed by: PHYSICIAN ASSISTANT

## 2022-10-05 PROCEDURE — 3077F SYST BP >= 140 MM HG: CPT | Mod: CPTII,S$GLB,, | Performed by: PHYSICIAN ASSISTANT

## 2022-10-05 PROCEDURE — 3077F PR MOST RECENT SYSTOLIC BLOOD PRESSURE >= 140 MM HG: ICD-10-PCS | Mod: CPTII,S$GLB,, | Performed by: PHYSICIAN ASSISTANT

## 2022-10-05 PROCEDURE — 1159F MED LIST DOCD IN RCRD: CPT | Mod: CPTII,S$GLB,, | Performed by: PHYSICIAN ASSISTANT

## 2022-10-05 PROCEDURE — 3080F PR MOST RECENT DIASTOLIC BLOOD PRESSURE >= 90 MM HG: ICD-10-PCS | Mod: CPTII,S$GLB,, | Performed by: PHYSICIAN ASSISTANT

## 2022-10-05 NOTE — PROGRESS NOTES
LUNG TRANSPLANT RECIPIENT FOLLOW-UP     Reason for Visit: Follow-up after lung transplantation.                               Date of Transplant: 8/22/17     Reason for Transplant: Sarcoidosis with pulmonary hypertension     Type of Transplant: bilateral sequential lung     CMV Status: D+ / R-      Major Complications:   1. Left vocal cord dysfunction   2. 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  A1- 1/2021  6.  Increasing DSA noted in 02/2021- s/p tx in 04/2021 with PLEX/IVIG/MP and ritux   7. 2/2022 COVID infection with residual functional decline                                                                               History of Present Illness: Martin Hendrix Jr. is a 60 y.o. year old male with the above stated history.  He is on 2L of oxygen. He is on no assisted ventilation although he has been diagnosed with KRISTYN and owns a CPAP.  His New York Heart Association Class is III and a Karnofsky score of 70% - Cares for self: Unable to carry on normal activity or active work.   He is diabetic insulin dependent.    Patient presents today for routine follow up. Remains at his baseline from a respiratory standpoint. Missed two pulmonary rehab sessions last week due to malaise, but feels improved today. Denies exacerbations/hospitalizations since his last clinic visit. History of ESRD on HD TIW. Continues to have occasional rhinorrhea associated with changing seasons. He is compliant with his transplant medications and is tolerating them well.       Review of Systems   Constitutional: Negative for chills, diaphoresis, fever, malaise/fatigue and weight loss.   HENT: Negative for congestion, ear discharge, ear pain, hearing loss, nosebleeds, sinus pain, sore throat and tinnitus.    Eyes: Negative for blurred vision, double vision, photophobia, pain, discharge and  "redness.   Respiratory: Positive for shortness of breath (with exertion). Negative for cough, hemoptysis, sputum production, wheezing and stridor.    Cardiovascular: Negative for chest pain, palpitations, orthopnea, claudication, leg swelling and PND.   Gastrointestinal: Negative for abdominal pain, blood in stool, constipation, diarrhea, heartburn, melena, nausea and vomiting.   Genitourinary: Negative for dysuria, flank pain, frequency, hematuria and urgency.        Decreased urination since kidney failure  Musculoskeletal: Negative for back pain, falls, joint pain, myalgias and neck pain.   Skin: Negative for itching and rash.   Neurological: Negative for dizziness (notes occasional lightheadedness after HD), tingling, tremors, sensory change, speech change, focal weakness, seizures, loss of consciousness, weakness and headaches.   Endo/Heme/Allergies: Negative for environmental allergies and polydipsia. Does not bruise/bleed easily.   Psychiatric/Behavioral: Negative for depression, hallucinations, memory loss, substance abuse and suicidal ideas. The patient is not nervous/anxious and does not have insomnia.      BP (!) 156/92   Pulse 106   Temp 98.2 °F (36.8 °C) (Oral)   Resp 20   Ht 5' 10" (1.778 m)   Wt 91 kg (200 lb 9.9 oz)   SpO2 96% Comment: 2 liters  BMI 28.79 kg/m²     Physical Exam  Vitals and nursing note reviewed.   Constitutional:       General: He is not in acute distress.     Appearance: He is not ill-appearing or diaphoretic.   HENT:      Head: Normocephalic and atraumatic.      Nose: Nose normal.      Mouth/Throat:      Pharynx: No oropharyngeal exudate.   Eyes:      General: No scleral icterus.     Extraocular Movements: Extraocular movements intact.      Conjunctiva/sclera: Conjunctivae normal.   Neck:      Thyroid: No thyromegaly.      Vascular: No JVD.      Trachea: No tracheal deviation.   Cardiovascular:      Rate and Rhythm: Normal rate and regular rhythm.      Heart sounds: Murmur " (2/6 systolic murmur best heard at LUSB) heard.   Pulmonary:      Effort: Pulmonary effort is normal. No respiratory distress.      Breath sounds: No stridor.  No wheezing or rhonchi. Mild rales at the bases.  Chest:      Chest wall: No tenderness.   Abdominal:      General: Bowel sounds are normal. There is no distension.      Palpations: Abdomen is soft. There is no mass.      Tenderness: There is no abdominal tenderness. There is no guarding or rebound.   Musculoskeletal:         General: No tenderness or deformity. Normal range of motion.      Cervical back: Normal range of motion and neck supple.   Lymphadenopathy:      Cervical: No cervical adenopathy.   Skin:     General: Skin is warm and dry.      Coloration: Skin is not pale.      Findings: No erythema or rash.   Neurological:      Mental Status: He is alert and oriented to person, place, and time.      Cranial Nerves: No cranial nerve deficit.      Coordination: Coordination normal.      Gait: Gait is intact.   Psychiatric:         Mood and Affect: Mood and affect normal.         Cognition and Memory: Memory normal.         Judgment: Judgment normal.       Labs:  cbc, cmp, tacrolimus Latest Ref Rng & Units 7/6/2022 10/5/2022 10/5/2022   TACROLIMUS LVL 5.0 - 15.0 ng/mL 3.7(L) - -   WHITE BLOOD CELL COUNT 3.90 - 12.70 K/uL 8.91 9.34 -   RBC 4.60 - 6.20 M/uL 4.18(L) 4.28(L) -   HEMOGLOBIN 14.0 - 18.0 g/dL 12.4(L) 13.2(L) -   HEMATOCRIT 40.0 - 54.0 % 39.2(L) 42.0 -   MCV 82 - 98 fL 94 98 -   MCH 27.0 - 31.0 pg 29.7 30.8 -   MCHC 32.0 - 36.0 g/dL 31.6(L) 31.4(L) -   RDW 11.5 - 14.5 % 14.0 13.1 -   PLATELETS 150 - 450 K/uL 283 338 -   MPV 9.2 - 12.9 fL 8.8(L) 8.7(L) -   GRAN # 1.8 - 7.7 K/uL 4.8 5.5 -   LYMPH # 1.0 - 4.8 K/uL 2.2 1.9 -   MONO # 0.3 - 1.0 K/uL 1.1(H) 1.2(H) -   EOSINOPHIL% 0.0 - 8.0 % 7.6 5.6 -   BASOPHIL% 0.0 - 1.9 % 1.2 1.7 -   DIFFERENTIAL METHOD - Automated Automated -   SODIUM 136 - 145 mmol/L 137 137 137   POTASSIUM 3.5 - 5.1 mmol/L 4.0 4.4  4.4   CHLORIDE 95 - 110 mmol/L 92(L) 93(L) 93(L)   CO2 23 - 29 mmol/L 30(H) 30(H) 30(H)   GLUCOSE 70 - 110 mg/dL 176(H) 152(H) 152(H)   BUN BLD 6 - 20 mg/dL 41(H) 31(H) 31(H)   CREATININE 0.5 - 1.4 mg/dL 10.4(H) 10.3(H) 10.3(H)   CALCIUM 8.7 - 10.5 mg/dL 9.7 9.4 9.4   PROTEIN TOTAL 6.0 - 8.4 g/dL 7.8 7.9 7.9   ALBUMIN 3.5 - 5.2 g/dL 4.0 4.0 4.0   BILIRUBIN TOTAL 0.1 - 1.0 mg/dL 0.7 0.5 0.5   ALK PHOS 55 - 135 U/L 91 98 98   AST 10 - 40 U/L 13 14 14   ALT 10 - 44 U/L 18 15 15   ANION GAP 8 - 16 mmol/L 15 14 14   EGFR IF AFRICAN AMERICAN >60 mL/min/1.73 m:2 5.6(A) - -   EGFR IF NON-AFRICAN AMERICAN >60 mL/min/1.73 m:2 4.8(A) - -     Pulmonary Function Tests 10/5/2022 7/6/2022 4/4/2022 3/7/2022 1/12/2022 12/8/2021 11/8/2021   FVC 1.44 1.57 1.29 1.35 1.65 1.66 1.65   FEV1 1.04 1.03 0.85 0.84 1.06 1.09 1.06   TLC (liters) - - - - - - -   DLCO (ml/mmHg sec) - - - - - - -   FVC% 31 33.8 27.7 29 35.2 35.4 35.2   FEV1% 28.9 28.5 23.6 23.2 29.3 30.2 29.3   FEF 25-75 0.67 0.53 0.44 0.39 0.54 0.58 0.53   FEF 25-75% 22.4 17.4 14.6 12.8 17.6 19 17.2   TLC% - - - - - - -   RV - - - - - - -   RV% - - - - - - -   DLCO% - - - - - - -         Imaging:  Results for orders placed during the hospital encounter of 10/05/22    X-Ray Chest PA And Lateral    Narrative  EXAMINATION:  XR CHEST PA AND LATERAL    CLINICAL HISTORY:  BSLT 8/22/2017; Lung transplant status    TECHNIQUE:  PA and lateral views of the chest were performed.    COMPARISON:  07/06/2022    FINDINGS:  Patient has had bilateral lung transplants.  Wire sutures seen in the sternum and heart size is unchanged.  Fibrotic changes can be seen in both lung fields with some loss of volume at the lung bases and pleural thickening but the overall appearance has not changed since July.    Impression  Stable findings post lung transplant      Electronically signed by: Carlton Parker MD  Date:    10/05/2022  Time:    08:19      Assessment:  1. Lung replaced by transplant        2.  Immunosuppression        3. Prophylactic antibiotic        4. ESRD (end stage renal disease)        5. Type 2 diabetes mellitus with hyperglycemia, with long-term current use of insulin        6. Chronic respiratory failure with hypoxia               Plan:     1. FEV1 remains stable. CXR without acute changes. Respiratory symptoms remain stable. Continue to monitor spirometry and imaging at routine visits.     2. Continue tacrolimus, everolimus 0.75 mg BID, prednisone 5 mg daily. Will review tacroimus level and adjust dose as needed. 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 Continue azithromycin MWF.     3. Continue bactrim SS.     4. Continue HD TThS. Continue epo per nephrology.    6. Continue diabetes regimen per endocrinology.     7. Continue oxygen supplementation with exertion.     8. RTC in 3 month or sooner if needed.       Teresa Trejo PA-C  Lung Transplant

## 2022-10-28 NOTE — PROGRESS NOTES
Outpatient Care Management  Initial Patient Assessment    Patient: Martin Hendrix Jr.  MRN: [id number]  Date of Service: 10/28/2022  Completed by: Oneal Decker RN  Referral Date: 09/14/2022  Program: High Risk  Status: Ongoing  Effective Dates: 10/28/2022 - present  Responsible Staff: Oneal Decker RN        Reason for Visit   Patient presents with    OPCM Enrollment Call    OPCM Chart Review    PHQ-9    Initial Assessment    Plan Of Care    OPCM Welcome Letter       Brief Summary:  Martin Hendrix Jr. was referred by Dr. Gomez for ESRD. Patient qualifies for program based on his risk score of 78.7%.   Active problem list, medical, surgical and social history reviewed. Active comorbidities include COPD,HTN, diabetes, ESRD stage 5 on dialysis, heart disease and afib. Areas of need identified by patient include diet related to ESRD.   Complex care plan created with patient/caregiver input. By next encounter, patient agrees to review mailed out literature and begin education via phone calls.     Assessment Documentation     OPCM Initial Assessment    Involvement of Care  Do I have permission to speak with other family members about your care?: No  Assessment completed by: Patient  Identified Areas of Need  Advanced Care Planning: No  Housing: no  Medication Adherence: No  *Active medication list was reviewed and reconciled with patient and/or caregiver:   Nutrition: no  Lab Adherence: no  Depression: No  Cognitive/Behavioral Health: no  Communication: no  Health Literacy: no  Fall risk?: No  Equipment/Supplies/Services: no          Problem List and History     Problems Addressed This Visit    Atrial Fibrillation: Not identified by patient as current problem  COPD: Not identified by patient as current problem  Hypertension: Not identified by patient as current problem  Alzheimer's: Not identified by patient as current problem  Anemia: Not identified by patient as current problem  Chronic Kidney Disease: Identified  as current problem  Hyperlipidemia: Not identified by patient as current problem  Heart Disease: Not identified by patient as current problem         Reviewed medical and social history with patient and/or caregiver. A complex care plan was discussed and completed today, with input from patient and/or caregiver.    Patient Instructions     Instructions were provided via the [facility] patient resources and are available for the patient to view on the patient portal, if active.    Next steps: Mail:  Welcome letter  Advanced directive information  Dialysis and diet information  Follow up in ten days         Todays OPCM Self-Management Care Plan was developed with the patients/caregivers input and was based on identified barriers from todays assessment.  Goals were written today with the patient/caregiver and the patient has agreed to work towards these goals to improve his/her overall well-being. Patient verbalized understanding of the care plan, goals, and all of today's instructions. Encouraged patient/caregiver to communicate with his/her physician and health care team about health conditions and the treatment plan.  Provided my contact information today and encouraged patient/caregiver to call me with any questions as needed.

## 2022-10-28 NOTE — LETTER
October 28, 2022 October 28, 2022    Martin Hendrix Jr.  [address]  [facility] [address]      Dear Mr. Hendrix,     Welcome to [facility]s Complex Care Management Program.  It was a pleasure talking with you today.  My name is Oneal Decker. I look forward to working with you as your .  My goal is to help you function at the healthiest and highest level possible.  You can contact me directly at [phone].    As an [facility] patient with [employer], some of the services we may be able to provide include:      Development of an individualized care plan with a Registered Nurse    Connection with a    Connection with available resources and services     Coordinate communication among your care team members    Provide coaching and education    Help you understand your doctors treatment plan   Help you obtain information about your insurance coverage.     All services provided by [facility]s Complex Care Managers and other care team members are coordinated with and communicated to your primary care team.    As part of your enrollment, you will be receiving education materials and more information about these services in your [facility] account, by phone or through the mail.  If you do not wish to participate or receive information, please contact our office at [phone].      Sincerely,    Oneal Decker, RN  [facility]   Out-patient RN Complex Care Manager

## 2022-11-02 NOTE — TRANSFER OF CARE
"Anesthesia Transfer of Care Note    Patient: Martin Hendrix Jr.    Procedure(s) Performed: Procedure(s) (LRB):  COLONOSCOPY (N/A)    Patient location: PACU    Anesthesia Type: MAC    Transport from OR: Transported from OR on room air with adequate spontaneous ventilation    Post pain: adequate analgesia    Post assessment: no apparent anesthetic complications    Post vital signs: stable    Level of consciousness: responds to stimulation    Nausea/Vomiting: no nausea/vomiting    Complications: none    Transfer of care protocol was followed      Last vitals:   Visit Vitals  BP (!) 163/77   Pulse 90   Temp 37.3 °C (99.1 °F)   Resp 20   Ht 5' 10" (1.778 m)   Wt 90.7 kg (200 lb)   SpO2 99%   BMI 28.70 kg/m²     "

## 2022-11-02 NOTE — PROVATION PATIENT INSTRUCTIONS
Discharge Summary/Instructions after an Endoscopic Procedure  Patient Name: Martin Hendrix  Patient MRN: [id number]  Patient YOB: 1960 Wednesday, November 2, 2022 Haritha Hendrix DO  Dear patient,  As a result of recent federal legislation (The Federal Cures Act), you may   receive lab or pathology results from your procedure in your [facility]   account before your physician is able to contact you. Your physician or   their representative will relay the results to you with their   recommendations at their soonest availability.  Thank you,  RESTRICTIONS:  During your procedure today, you received medications for sedation.  These   medications may affect your judgment, balance and coordination.  Therefore,   for 24 hours, you have the following restrictions:   - DO NOT drive a car, operate machinery, make legal/financial decisions,   sign important papers or drink alcohol.    ACTIVITY:  Today: no heavy lifting, straining or running due to procedural   sedation/anesthesia.  The following day: return to full activity including work.  DIET:  Eat and drink normally unless instructed otherwise.     TREATMENT FOR COMMON SIDE EFFECTS:  - Mild abdominal pain, nausea, belching, bloating or excessive gas:  rest,   eat lightly and use a heating pad.  - Sore Throat: treat with throat lozenges and/or gargle with warm salt   water.  - Because air was used during the procedure, expelling large amounts of air   from your rectum or belching is normal.  - If a bowel prep was taken, you may not have a bowel movement for 1-3 days.    This is normal.  SYMPTOMS TO WATCH FOR AND REPORT TO YOUR PHYSICIAN:  1. Abdominal pain or bloating, other than gas cramps.  2. Chest pain.  3. Back pain.  4. Signs of infection such as: chills or fever occurring within 24 hours   after the procedure.  5. Rectal bleeding, which would show as bright red, maroon, or black stools.   (A tablespoon of blood from the rectum is not serious, especially  if   hemorrhoids are present.)  6. Vomiting.  7. Weakness or dizziness.  GO DIRECTLY TO THE NEAREST EMERGENCY ROOM IF YOU HAVE ANY OF THE FOLLOWING:      Difficulty breathing              Chills and/or fever over 101 F   Persistent vomiting and/or vomiting blood   Severe abdominal pain   Severe chest pain   Black, tarry stools   Bleeding- more than one tablespoon   Any other symptom or condition that you feel may need urgent attention  Your doctor recommends these additional instructions:  If any biopsies were taken, your doctors clinic will contact you in 1 to 2   weeks with any results.  - Patient has a contact number available for emergencies.  The signs and   symptoms of potential delayed complications were discussed with the   patient.  Return to normal activities tomorrow.  Written discharge   instructions were provided to the patient.   - Resume previous diet.   - Discharge patient to home (ambulatory).   - Continue present medications.   - Repeat colonoscopy in 10 years for surveillance.   - Return to GI clinic PRN.  For questions, problems or results please call your physician Haritha Hendrix, DO at Work:  [phone]  If you have any questions about the above instructions, call the GI   department at [phone] or call the endoscopy unit at [phone]   from 7am until 3 pm.  [facility], EMERGENCY ROOM PHONE NUMBER:   [phone]  IF A COMPLICATION OR EMERGENCY SITUATION ARISES AND YOU ARE UNABLE TO REACH   YOUR PHYSICIAN - GO DIRECTLY TO THE EMERGENCY ROOM.  I have read or have had read to me these discharge instructions for my   procedure and have received a written copy.  I understand these   instructions and will follow-up with my physician if I have any questions.     __________________________________       _____________________________________  Nurse Signature                                          Patient/Designated   Responsible Party Signature  Haritha  BRENT Hendrix DO  11/2/2022 11:28:03 AM  This report has been verified and signed electronically.  Dear patient,  As a result of recent federal legislation (The Federal Cures Act), you may   receive lab or pathology results from your procedure in your [facility]   account before your physician is able to contact you. Your physician or   their representative will relay the results to you with their   recommendations at their soonest availability.  Thank you,  PROVATION

## 2022-11-02 NOTE — ANESTHESIA POSTPROCEDURE EVALUATION
Anesthesia Post Evaluation    Patient: Martin Hendrix Jr.    Procedure(s) Performed: Procedure(s) (LRB):  COLONOSCOPY (N/A)    Final Anesthesia Type: MAC      Patient location during evaluation: PACU  Patient participation: Yes- Able to Participate  Level of consciousness: awake and alert  Post-procedure vital signs: reviewed and stable  Pain management: adequate  Airway patency: patent    PONV status at discharge: No PONV  Anesthetic complications: no      Cardiovascular status: blood pressure returned to baseline  Respiratory status: unassisted and room air  Hydration status: euvolemic  Follow-up not needed.          Vitals Value Taken Time   /77 11/02/22 1006   Temp 37.3 °C (99.1 °F) 11/02/22 0948   Pulse 90 11/02/22 0948   Resp 20 11/02/22 0948   SpO2 99 % 11/02/22 0948         No case tracking events are documented in the log.      Pain/Radha Score: No data recorded

## 2022-11-02 NOTE — DISCHARGE SUMMARY
[facility] - End[facility] (Hospital)  Discharge Note  Short Stay    Procedure(s) (LRB):  COLONOSCOPY (N/A)      OUTCOME: Patient tolerated treatment/procedure well without complication and is now ready for discharge.    DISPOSITION: Home or Self Care    FINAL DIAGNOSIS:  <principal problem not specified>    FOLLOWUP: With primary care provider    DISCHARGE INSTRUCTIONS:  No discharge procedures on file.     TIME SPENT ON DISCHARGE: 5 minutes

## 2022-11-02 NOTE — ANESTHESIA PREPROCEDURE EVALUATION
11/02/2022  Martin Hendrix Jr. is a 61 y.o., male.      Pre-op Assessment    I have reviewed the Patient Summary Reports.     I have reviewed the Nursing Notes. I have reviewed the NPO Status.   I have reviewed the Medications.     Review of Systems  Anesthesia Hx:  No problems with previous Anesthesia  Denies Family Hx of Anesthesia complications.   Denies Personal Hx of Anesthesia complications.   Social:  Non-Smoker    Hematology/Oncology:  Hematology Normal   Oncology Normal     EENT/Dental:EENT/Dental Normal   Cardiovascular:   Exercise tolerance: poor Hypertension Dysrhythmias atrial fibrillation hyperlipidemia ECG has been reviewed.    Pulmonary:   COPD, severe Shortness of breath Sleep Apnea Sarcoidosis of lung  LUNG TRANSPLANT   Home O2   Renal/:   Chronic Renal Disease, ESRD, Dialysis    Hepatic/GI:   GERD    Musculoskeletal:  Musculoskeletal Normal    Neurological:  Neurology Normal    Endocrine:   Diabetes, type 2    Dermatological:  Skin Normal    Psych:  Psychiatric Normal           Physical Exam  General: Well nourished, Cooperative, Alert and Oriented    Airway:  Mallampati: II   Mouth Opening: Normal  TM Distance: Normal  Tongue: Normal  Neck ROM: Normal ROM    Dental:  Intact    Chest/Lungs:  Clear to auscultation, Normal Respiratory Rate    Heart:  Rate: Normal  Rhythm: Regular Rhythm        Anesthesia Plan  Type of Anesthesia, risks & benefits discussed:    Anesthesia Type: MAC  Intra-op Monitoring Plan: Standard ASA Monitors  Induction:  IV  Informed Consent: Informed consent signed with the Patient and all parties understand the risks and agree with anesthesia plan.  All questions answered.   ASA Score: 4  Day of Surgery Review of History & Physical: H&P Update referred to the surgeon/provider.I have interviewed and examined the patient. I have reviewed the patient's H&P dated: There  are no significant changes.     Ready For Surgery From Anesthesia Perspective.     .

## 2022-11-02 NOTE — H&P
PRE PROCEDURE H&P    Patient Name: Martin Hendrix Jr.  MRN: [id number]  : 1960  Date of Procedure:  2022  Referring Physician: Michel Henley MD  Primary Physician: Michel Henley MD  Procedure Physician: Haritha Hendrix DO      Planned Procedure: Colonoscopy  Diagnosis: screening for colon cancer  Chief Complaint: Same as above    HPI: Patient is an 61 y.o. male is here for the above.     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 Peacock    DM (diabetes mellitus) 2017     2021 Insulin x     Hypertension     On home oxygen therapy     KRISTYN on CPAP     Osteopenia     Pancreatitis     hospitalized    Pulmonary hypertension     Pure hypercholesterolemia 11/15/2017    Sarcoidosis     Shortness of breath     Type 2 diabetes mellitus 2017        Past Surgical History:  Past Surgical History:   Procedure Laterality Date    ABDOMINAL SURGERY      6 weeks old    AV FISTULA PLACEMENT Left 10/13/2021    Procedure: CREATION, AV FISTULA;  Surgeon: CARLI Thomason II, MD;  Location: [facility] OR [address] [place] [address];  Service: Vascular;  Laterality: Left;    BRONCHOSCOPY      BRONCHOSCOPY N/A 2018    Procedure: flexible bronchoscopy with tissue biopsy CPT 11208;  Surgeon: [facility] Diagnostic Provider;  Location: [facility] OR [address] [place] [address];  Service: Endoscopy;  Laterality: N/A;    BRONCHOSCOPY N/A 2018    Procedure: flexible bronchoscopy with tissue biopy CPT 69809;  Surgeon: [facility] Diagnostic Provider;  [facility];  Service: Anesthesiology;  Laterality: N/A;    BRONCHOSCOPY N/A 11/10/2021    Procedure: Flexible  bronchoscopy;  Surgeon: Samantha Bill DO;  [facility];  Service: Endoscopy;  Laterality: N/A;    CHEST TUBE INSERTION      CHOLECYSTECTOMY      COLONOSCOPY      ESOPHAGOGASTRODUODENOSCOPY N/A 8/6/2018    Procedure: EGD (ESOPHAGOGASTRODUODENOSCOPY);  Surgeon: Ramu Eisenberg MD;  Location: [facility] (2ND FLR);  Service: Endoscopy;  Laterality: N/A;  off pepcid and all acid reducers for 2 weeks; PA > 50    FISTULOGRAM Left 1/10/2022    Procedure: Fistulogram;  Surgeon: CARLI Thomason II, MD;  Location: [facility] CATH LAB;  Service: Vascular;  Laterality: Left;    INSERTION OF TUNNELED CENTRAL VENOUS HEMODIALYSIS CATHETER N/A 3/13/2019    Procedure: INSERTION, CATHETER, CENTRAL VENOUS, HEMODIALYSIS, TUNNELED;  Surgeon: Edy Gonzalez MD;  Location: [facility] CATH LAB;  Service: Nephrology;  Laterality: N/A;    INSERTION OF TUNNELED CENTRAL VENOUS HEMODIALYSIS CATHETER Right 5/7/2021    Procedure: Insertion, Catheter, Central Venous, Hemodialysis;  Surgeon: Mary Joshi MD;  Location: [facility] CATH LAB;  Service: Interventional Nephrology;  Laterality: Right;    LUNG BIOPSY      LUNG TRANSPLANT  08/2017    PERCUTANEOUS TRANSLUMINAL ANGIOPLASTY OF ARTERIOVENOUS FISTULA N/A 1/10/2022    Procedure: PTA, AV FISTULA;  Surgeon: CARLI Thomason II, MD;  Location: [facility] CATH LAB;  Service: Vascular;  Laterality: N/A;    REMOVAL OF TUNNELED CENTRAL VENOUS CATHETER (CVC) N/A 5/16/2019    Procedure: REMOVAL, CATHETER, CENTRAL VENOUS, TUNNELED;  Surgeon: Edy Gonzalez MD;  Location: [facility] CATH LAB;  Service: Nephrology;  Laterality: N/A;    REVISION OF ARTERIOVENOUS FISTULA Left 11/18/2021    Procedure: REVISION, AV FISTULA;  Surgeon: CARLI Thomason II, MD;  [facility];  Service: Vascular;  Laterality: Left;  Ligation of side branches    TONSILLECTOMY          Home Medications:  Prior to Admission medications    Medication Sig Start Date End Date Taking? Authorizing Provider   azithromycin  (Z-REYNA) 250 MG tablet Take 1 tablet (250 mg total) by mouth every Mon, Wed, Fri. 4/4/22  Yes Robina Mcdonald MD   carvediloL (COREG) 3.125 MG tablet Take 3.125 mg by mouth 2 (two) times daily with meals.   Yes Historical Provider   cholecalciferol, vitamin D3, (VITAMIN D3) 25 mcg (1,000 unit) capsule Take 2 capsules (2,000 Units total) by mouth once daily. 7/26/20  Yes Nadeem Peacock MD   ECOTRIN LOW STRENGTH 81 mg EC tablet TAKE 1 TABLET DAILY 11/11/19  Yes Darrick Wolfe MD   epoetin greer-epbx (RETACRIT) 4,000 unit/mL injection Inject 1.41 mLs (5,640 Units total) into the skin every Tues, Thurs, Sat. Administered by dialysis unit on T/Th/Sat. 5/11/21  Yes Samantha Bill,    everolimus, immunosuppressive, (ZORTRESS) 0.75 mg tablet Take 1 tablet (0.75 mg total) by mouth every 12 (twelve) hours. 1/12/22 1/12/23 Yes Samantha Bill,    folic acid (FOLVITE) 1 MG tablet TAKE 1 TABLET DAILY 11/11/19  Yes Darrick Wolfe MD   furosemide (LASIX) 40 MG tablet One po QDAY 8/11/22  Yes Nadeem Peacock MD   insulin aspart U-100 (NOVOLOG FLEXPEN U-100 INSULIN) 100 unit/mL (3 mL) InPn pen Inject 10 Units into the skin 3 (three) times daily with meals. 9/14/21  Yes Michel Henley MD   insulin degludec (TRESIBA FLEXTOUCH U-200) 200 unit/mL (3 mL) insulin pen INJECT 20 UNITS INTO THE SKIN EVERY EVENING 10/26/22  Yes Michel Henley MD   magnesium chloride (MAG 64) 64 mg TbEC Take 2 tablets (128 mg total) by mouth 2 (two) times daily. 10/27/22  Yes Robina Mcdonald MD   ondansetron (ZOFRAN-ODT) 8 MG TbDL Dissolve 1 tablet (8 mg total) by mouth daily as needed (pre-med for IVIG infusions). 5/12/21  Yes Samantha Bill DO   pantoprazole (PROTONIX) 40 MG tablet Take 1 tablet (40 mg total) by mouth once daily. 11/8/21 11/26/22 Yes Robina Mcdonald MD   predniSONE (DELTASONE) 5 MG tablet TAKE 1 TABLET BY MOUTH EVERY DAY 9/27/22  Yes Michel Henley MD   rosuvastatin (CRESTOR) 10 MG  "tablet Take 1 tablet (10 mg total) by mouth once daily. 10/27/22  Yes Robina Mcdonald MD   tacrolimus (PROGRAF) 0.5 MG Cap Take 3 capsules (1.5 mg total) by mouth every 12 (twelve) hours. 3/3/22  Yes Robina Mcdonald MD   amLODIPine (NORVASC) 10 MG tablet TAKE 1 TABLET BY MOUTH EVERY DAY 10/24/22   Samantha Bill DO   cinacalcet (SENSIPAR) 30 MG Tab One po QDAY, with largest meal 9/28/21   Nadeem Peacock MD   cloNIDine (CATAPRES) 0.1 MG tablet Take 1 tablet (0.1 mg total) by mouth 3 (three) times daily.  Patient not taking: Reported on 10/5/2022 6/24/22 6/24/23  Robina Mcdonald MD   ferric citrate (AURYXIA) 210 mg iron Tab TAKE 3 TABLETS BY MOUTH 3 TIMES A DAY WITH FOOD 10/29/22   Nadeem Peacock MD   lancets Misc To check BG 4 times daily, to use with insurance preferred meter 7/2/18   Charlotte Prieto NP   metoprolol tartrate (LOPRESSOR) 25 MG tablet Take 1 tablet (25 mg total) by mouth 2 (two) times daily. 10/29/22 10/29/23  Nadeem Peacock MD   ONETOUCH VERIO Strp USE TO CHECK BLOOD GLUCOSE FOUR TIMES A DAY, TO USE WITH INSURANCE PREFERRED METER 11/15/18   Charlotte Prieto NP   pen needle, diabetic (BD ULTRA-FINE JIM PEN NEEDLE) 32 gauge x 5/32" Ndle Uses 4 times daily, on multiple daily insulin injections 3/25/19   Charlotte Prieto NP   pulse oximeter (PULSE OXIMETER) device by Apply Externally route 2 (two) times a day. Use twice daily at 8 AM and 3 PM and [facility]. 2/17/22   Kaelyn Edmondson MD   sodium bicarbonate 650 MG tablet Two in am and two in pm  Patient not taking: Reported on 10/5/2022 1/25/21   Nadeem Peacock MD   sulfamethoxazole-trimethoprim 400-80mg (BACTRIM,SEPTRA) 400-80 mg per tablet Take 1 tablet by mouth every Mon, Wed, Fri. 1/12/22   Robina Mcdonald MD   valsartan (DIOVAN) 160 MG tablet TAKE 1 TABLET (160 MG TOTAL) BY MOUTH ONCE DAILY.  Patient not taking: Reported on 10/5/2022 7/18/22   Nadeem Peacock MD   ACCU-CHEK " "DEANNE PLUS METER Misc USE AS DIRECTED 4/19/17 12/16/19  Historical Provider        Allergies:  Review of patient's allergies indicates:  No Known Allergies     Social History:   Social History     Socioeconomic History    Marital status:    Tobacco Use    Smoking status: Never    Smokeless tobacco: Never   Substance and Sexual Activity    Alcohol use: No    Drug use: No       Family History:  Family History   Problem Relation Age of Onset    Hypertension Mother     Diabetes Father     Blindness Father     Kidney disease Sister     Diabetes Brother        ROS: No acute cardiac events, no acute respiratory complaints.     Physical Exam (all patients):    BP (!) 163/77   Pulse 90   Temp 99.1 °F (37.3 °C)   Resp 20   Ht 5' 10" (1.778 m)   Wt 90.7 kg (200 lb)   SpO2 99%   BMI 28.70 kg/m²   Lungs: On constant supplemental O2 via NC  Heart: Regular rate and rhythm, S1 and S2 normal, no obvious murmurs  Abdomen:         Soft, non-tender, bowel sounds normal, no masses, no organomegaly    Lab Results   Component Value Date    WBC 9.34 10/05/2022    MCV 98 10/05/2022    RDW 13.1 10/05/2022     10/05/2022    INR 1.0 02/13/2022     (H) 10/05/2022     (H) 10/05/2022    HGBA1C 7.8 (H) 10/05/2022    BUN 31 (H) 10/05/2022    BUN 31 (H) 10/05/2022     10/05/2022     10/05/2022    K 4.1 11/02/2022    CL 93 (L) 10/05/2022    CL 93 (L) 10/05/2022        SEDATION PLAN: per anesthesia      History reviewed, vital signs satisfactory, cardiopulmonary status satisfactory, sedation options, risks and plans have been discussed with the patient  All their questions were answered and the patient agrees to the sedation procedures as planned and the patient is deemed an appropriate candidate for the sedation as planned.    Procedure explained to patient, informed consent obtained and placed in chart.    Haritha Hendrix, DO  General Surgery  [facility]  11/2/2022      "

## 2022-11-10 NOTE — INTERVAL H&P NOTE
The patient has been examined and the H&P has been reviewed:    I concur with the findings and no changes have occurred since H&P was written.    Anesthesia/Surgery risks, benefits and alternative options discussed and understood by patient/family.          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     
18

## 2022-11-17 NOTE — PROGRESS NOTES
Outpatient Care Management  Plan of Care Follow Up Visit    Patient: Martin Hendrix Jr.  MRN: [id number]  Date of Service: 11/17/2022  Completed by: Oneal Decker RN  Referral Date: 09/14/2022    Reason for Visit   Patient presents with    Update Plan Of Care    OPCM Chart Review       Brief Summary: Phone contact made with Mr. Hendrix and he stated that he was in [place] visiting his daughter. He requested a call back after Thanksgiving to continue education on his CKD.     Patient Summary     Involvement of Care:  Do I have permission to speak with other family members about your care?   no    Patient Reported Labs & Vitals:  1.  Any Patient Reported Labs & Vitals?   no  2.  Patient Reported Blood Pressure:     3.  Patient Reported Pulse:     4.  Patient Reported Weight (Kg):     5.  Patient Reported Blood Glucose (mg/dl):       Medical and social history was reviewed with patient and/or caregiver.     Clinical Assessment     Reviewed and provided basic information on available community resources for mental health, transportation, wellness resources, and palliative care programs with patient and/or caregiver.     Complex Care Plan     Care plan was discussed and completed today with input from patient and/or caregiver.    Patient Instructions     Instructions were provided via the [employer] patient resources and are available for the patient to view on the patient portal.    Next Steps: Follow up in two weeks to continue education and management         Todays OPC Self-Management Care Plan was developed with the patients/caregivers input and was based on identified barriers from todays assessment.  Goals were written today with the patient/caregiver and the patient has agreed to work towards these goals to improve his/her overall well-being. Patient verbalized understanding of the care plan, goals, and all of today's instructions. Encouraged patient/caregiver to communicate with his/her physician and health care team  about health conditions and the treatment plan.  Provided my contact information today and encouraged patient/caregiver to call me with any questions as needed.

## 2022-11-30 NOTE — PROGRESS NOTES
Outpatient Care Management  Plan of Care Follow Up Visit    Patient: Martin Hendrix Jr.  MRN: [id number]  Date of Service: 11/30/2022  Completed by: Oneal Decker RN  Referral Date: 09/14/2022    Reason for Visit   Patient presents with    Update Plan Of Care    OPCM Chart Review       Brief Summary: Phone contact made with Mr. Hendrix and we discussed his CKD care plan. No new issues at this time. Plan to follow up in two weeks to continue education and management     Patient Summary     Involvement of Care:  Do I have permission to speak with other family members about your care?   no    Patient Reported Labs & Vitals:  1.  Any Patient Reported Labs & Vitals?   no  2.  Patient Reported Blood Pressure:     3.  Patient Reported Pulse:     4.  Patient Reported Weight (Kg):     5.  Patient Reported Blood Glucose (mg/dl):       Medical and social history was reviewed with patient and/or caregiver.     Clinical Assessment     Reviewed and provided basic information on available community resources for mental health, transportation, wellness resources, and palliative care programs with patient and/or caregiver.     Complex Care Plan     Care plan was discussed and completed today with input from patient and/or caregiver.    Patient Instructions     Instructions were provided via the [employer] patient resources and are available for the patient to view on the patient portal.    Next Steps: Follow up in two weeks to continue education and management         Todays OPC Self-Management Care Plan was developed with the patients/caregivers input and was based on identified barriers from todays assessment.  Goals were written today with the patient/caregiver and the patient has agreed to work towards these goals to improve his/her overall well-being. Patient verbalized understanding of the care plan, goals, and all of today's instructions. Encouraged patient/caregiver to communicate with his/her physician and health care team  about health conditions and the treatment plan.  Provided my contact information today and encouraged patient/caregiver to call me with any questions as needed.

## 2022-12-21 NOTE — PROGRESS NOTES
Outpatient Care Management  Plan of Care Follow Up Visit    Patient: Martin Hendrix Jr.  MRN: [id number]  Date of Service: 12/21/2022  Completed by: Oneal Decker RN  Referral Date: 09/14/2022    Reason for Visit   Patient presents with    OPCM Chart Review    Update Plan Of Care       Brief Summary: Phone contact made with Mr. Hendrix and we discussed his CKD care plan. No new issues at this time, plan to follow up in three weeks.   Next Steps: Follow up in three weeks

## 2023-01-01 ENCOUNTER — TELEPHONE (OUTPATIENT)
Dept: TRANSPLANT | Facility: CLINIC | Age: 63
End: 2023-01-01
Payer: MEDICARE

## 2023-01-01 ENCOUNTER — HOSPITAL ENCOUNTER (OUTPATIENT)
Dept: RADIOLOGY | Facility: HOSPITAL | Age: 63
Discharge: HOME | End: 2023-03-08
Attending: INTERNAL MEDICINE
Payer: MEDICARE

## 2023-01-01 ENCOUNTER — OFFICE VISIT (OUTPATIENT)
Dept: OPHTHALMOLOGY | Facility: CLINIC | Age: 63
End: 2023-01-01
Payer: MEDICARE

## 2023-01-01 ENCOUNTER — HOSPITAL ENCOUNTER (OUTPATIENT)
Dept: PULMONOLOGY | Facility: CLINIC | Age: 63
Discharge: HOME | End: 2023-06-07
Attending: INTERNAL MEDICINE
Payer: MEDICARE

## 2023-01-01 ENCOUNTER — DOCUMENTATION ONLY (OUTPATIENT)
Dept: TRANSPLANT | Facility: CLINIC | Age: 63
End: 2023-01-01
Payer: MEDICARE

## 2023-01-01 ENCOUNTER — DOCUMENT SCAN (OUTPATIENT)
Dept: HOME HEALTH SERVICES | Facility: HOSPITAL | Age: 63
End: 2023-01-01
Payer: MEDICARE

## 2023-01-01 ENCOUNTER — LAB VISIT (OUTPATIENT)
Dept: LAB | Facility: HOSPITAL | Age: 63
End: 2023-01-01
Attending: INTERNAL MEDICINE
Payer: MEDICARE

## 2023-01-01 ENCOUNTER — HOSPITAL ENCOUNTER (OUTPATIENT)
Dept: PULMONOLOGY | Facility: CLINIC | Age: 63
Discharge: HOME | End: 2023-06-07
Payer: MEDICARE

## 2023-01-01 ENCOUNTER — OFFICE VISIT (OUTPATIENT)
Dept: FAMILY MEDICINE | Facility: CLINIC | Age: 63
End: 2023-01-01
Payer: MEDICARE

## 2023-01-01 ENCOUNTER — HOSPITAL ENCOUNTER (OUTPATIENT)
Dept: RADIOLOGY | Facility: HOSPITAL | Age: 63
Discharge: HOME | End: 2023-01-09
Attending: INTERNAL MEDICINE
Payer: MEDICARE

## 2023-01-01 ENCOUNTER — ANESTHESIA EVENT (OUTPATIENT)
Dept: SURGERY | Facility: HOSPITAL | Age: 63
End: 2023-01-01
Payer: MEDICARE

## 2023-01-01 ENCOUNTER — OFFICE VISIT (OUTPATIENT)
Dept: TRANSPLANT | Facility: CLINIC | Age: 63
End: 2023-01-01
Payer: MEDICARE

## 2023-01-01 ENCOUNTER — HOSPITAL ENCOUNTER (OUTPATIENT)
Dept: VASCULAR SURGERY | Facility: CLINIC | Age: 63
Discharge: HOME | End: 2023-02-03
Attending: SURGERY
Payer: MEDICARE

## 2023-01-01 ENCOUNTER — TELEPHONE (OUTPATIENT)
Dept: FAMILY MEDICINE | Facility: CLINIC | Age: 63
End: 2023-01-01
Payer: MEDICARE

## 2023-01-01 ENCOUNTER — PES CALL (OUTPATIENT)
Dept: ADMINISTRATIVE | Facility: OTHER | Age: 63
End: 2023-01-01
Payer: MEDICARE

## 2023-01-01 ENCOUNTER — HOSPITAL ENCOUNTER (INPATIENT)
Facility: HOSPITAL | Age: 63
LOS: 14 days | Discharge: HOME | DRG: 205 | End: 2023-08-08
Attending: INTERNAL MEDICINE | Admitting: INTERNAL MEDICINE
Payer: MEDICARE

## 2023-01-01 ENCOUNTER — HOSPITAL ENCOUNTER (OUTPATIENT)
Dept: RADIOLOGY | Facility: HOSPITAL | Age: 63
Discharge: HOME | End: 2023-06-07
Attending: INTERNAL MEDICINE
Payer: MEDICARE

## 2023-01-01 ENCOUNTER — OFFICE VISIT (OUTPATIENT)
Dept: VASCULAR SURGERY | Facility: CLINIC | Age: 63
End: 2023-01-01
Attending: SURGERY
Payer: MEDICARE

## 2023-01-01 ENCOUNTER — HOSPITAL ENCOUNTER (OUTPATIENT)
Dept: RADIOLOGY | Facility: HOSPITAL | Age: 63
Discharge: HOME | End: 2023-09-20
Attending: INTERNAL MEDICINE
Payer: MEDICARE

## 2023-01-01 ENCOUNTER — PATIENT MESSAGE (OUTPATIENT)
Dept: OPHTHALMOLOGY | Facility: CLINIC | Age: 63
End: 2023-01-01

## 2023-01-01 ENCOUNTER — OFFICE VISIT (OUTPATIENT)
Dept: CARDIOLOGY | Facility: CLINIC | Age: 63
End: 2023-01-01
Payer: MEDICARE

## 2023-01-01 ENCOUNTER — HOSPITAL ENCOUNTER (OUTPATIENT)
Dept: RADIOLOGY | Facility: HOSPITAL | Age: 63
Discharge: HOME | End: 2023-08-23
Attending: INTERNAL MEDICINE
Payer: MEDICARE

## 2023-01-01 ENCOUNTER — HOSPITAL ENCOUNTER (OUTPATIENT)
Dept: PULMONOLOGY | Facility: HOSPITAL | Age: 63
Discharge: HOME | End: 2023-03-08
Attending: INTERNAL MEDICINE
Payer: MEDICARE

## 2023-01-01 ENCOUNTER — OUTPATIENT CASE MANAGEMENT (OUTPATIENT)
Dept: ADMINISTRATIVE | Facility: OTHER | Age: 63
End: 2023-01-01
Payer: MEDICARE

## 2023-01-01 ENCOUNTER — HOSPITAL ENCOUNTER (OUTPATIENT)
Dept: PULMONOLOGY | Facility: HOSPITAL | Age: 63
Discharge: HOME | End: 2023-09-20
Attending: INTERNAL MEDICINE
Payer: MEDICARE

## 2023-01-01 ENCOUNTER — PATIENT MESSAGE (OUTPATIENT)
Dept: TRANSPLANT | Facility: CLINIC | Age: 63
End: 2023-01-01
Payer: MEDICARE

## 2023-01-01 ENCOUNTER — HOSPITAL ENCOUNTER (OUTPATIENT)
Facility: HOSPITAL | Age: 63
Discharge: HOME | End: 2023-01-20
Attending: SURGERY | Admitting: SURGERY
Payer: MEDICARE

## 2023-01-01 ENCOUNTER — HOSPITAL ENCOUNTER (OUTPATIENT)
Dept: VASCULAR SURGERY | Facility: CLINIC | Age: 63
Discharge: HOME | End: 2023-01-09
Attending: SURGERY
Payer: MEDICARE

## 2023-01-01 ENCOUNTER — HOSPITAL ENCOUNTER (OUTPATIENT)
Dept: PULMONOLOGY | Facility: CLINIC | Age: 63
Discharge: HOME | End: 2023-09-20
Payer: MEDICARE

## 2023-01-01 ENCOUNTER — EXTERNAL HOME HEALTH (OUTPATIENT)
Dept: HOME HEALTH SERVICES | Facility: HOSPITAL | Age: 63
End: 2023-01-01
Payer: MEDICARE

## 2023-01-01 ENCOUNTER — HOSPITAL ENCOUNTER (OUTPATIENT)
Dept: PULMONOLOGY | Facility: HOSPITAL | Age: 63
Discharge: HOME | End: 2023-01-09
Attending: INTERNAL MEDICINE
Payer: MEDICARE

## 2023-01-01 ENCOUNTER — TELEPHONE (OUTPATIENT)
Dept: VASCULAR SURGERY | Facility: CLINIC | Age: 63
End: 2023-01-01
Payer: MEDICARE

## 2023-01-01 ENCOUNTER — HOSPITAL ENCOUNTER (OUTPATIENT)
Dept: PULMONOLOGY | Facility: HOSPITAL | Age: 63
Discharge: HOME | End: 2023-01-25
Attending: INTERNAL MEDICINE
Payer: MEDICARE

## 2023-01-01 ENCOUNTER — TELEPHONE (OUTPATIENT)
Dept: TRANSPLANT | Facility: HOSPITAL | Age: 63
End: 2023-01-01
Payer: MEDICARE

## 2023-01-01 ENCOUNTER — PATIENT MESSAGE (OUTPATIENT)
Dept: NEPHROLOGY | Facility: CLINIC | Age: 63
End: 2023-01-01
Payer: MEDICARE

## 2023-01-01 ENCOUNTER — TELEPHONE (OUTPATIENT)
Dept: TRANSPLANT | Facility: CLINIC | Age: 63
End: 2023-01-01

## 2023-01-01 ENCOUNTER — HOSPITAL ENCOUNTER (OUTPATIENT)
Dept: PULMONOLOGY | Facility: HOSPITAL | Age: 63
Discharge: HOME | End: 2023-08-23
Attending: INTERNAL MEDICINE
Payer: MEDICARE

## 2023-01-01 ENCOUNTER — ANESTHESIA (OUTPATIENT)
Dept: SURGERY | Facility: HOSPITAL | Age: 63
End: 2023-01-01
Payer: MEDICARE

## 2023-01-01 VITALS
BODY MASS INDEX: 25.32 KG/M2 | HEIGHT: 71 IN | SYSTOLIC BLOOD PRESSURE: 120 MMHG | DIASTOLIC BLOOD PRESSURE: 70 MMHG | OXYGEN SATURATION: 99 % | WEIGHT: 180.88 LBS | HEART RATE: 67 BPM

## 2023-01-01 VITALS
HEIGHT: 70 IN | DIASTOLIC BLOOD PRESSURE: 86 MMHG | OXYGEN SATURATION: 96 % | BODY MASS INDEX: 27.92 KG/M2 | WEIGHT: 195 LBS | SYSTOLIC BLOOD PRESSURE: 124 MMHG | HEART RATE: 94 BPM

## 2023-01-01 VITALS
RESPIRATION RATE: 20 BRPM | OXYGEN SATURATION: 99 % | BODY MASS INDEX: 25.56 KG/M2 | HEIGHT: 70 IN | DIASTOLIC BLOOD PRESSURE: 85 MMHG | SYSTOLIC BLOOD PRESSURE: 140 MMHG | HEART RATE: 108 BPM | TEMPERATURE: 99 F | WEIGHT: 178.56 LBS

## 2023-01-01 VITALS
HEART RATE: 85 BPM | HEIGHT: 70 IN | BODY MASS INDEX: 28.56 KG/M2 | TEMPERATURE: 99 F | SYSTOLIC BLOOD PRESSURE: 138 MMHG | OXYGEN SATURATION: 95 % | WEIGHT: 199.5 LBS | DIASTOLIC BLOOD PRESSURE: 66 MMHG

## 2023-01-01 VITALS
BODY MASS INDEX: 25.31 KG/M2 | RESPIRATION RATE: 18 BRPM | HEART RATE: 96 BPM | HEIGHT: 71 IN | TEMPERATURE: 98 F | WEIGHT: 180.75 LBS | SYSTOLIC BLOOD PRESSURE: 106 MMHG | DIASTOLIC BLOOD PRESSURE: 59 MMHG | OXYGEN SATURATION: 99 %

## 2023-01-01 VITALS
SYSTOLIC BLOOD PRESSURE: 108 MMHG | TEMPERATURE: 97 F | WEIGHT: 200 LBS | RESPIRATION RATE: 18 BRPM | OXYGEN SATURATION: 100 % | HEART RATE: 59 BPM | HEIGHT: 71 IN | BODY MASS INDEX: 28 KG/M2 | DIASTOLIC BLOOD PRESSURE: 68 MMHG

## 2023-01-01 VITALS
HEIGHT: 70 IN | OXYGEN SATURATION: 95 % | TEMPERATURE: 97 F | SYSTOLIC BLOOD PRESSURE: 146 MMHG | WEIGHT: 191.81 LBS | DIASTOLIC BLOOD PRESSURE: 91 MMHG | RESPIRATION RATE: 20 BRPM | BODY MASS INDEX: 27.46 KG/M2 | HEART RATE: 104 BPM

## 2023-01-01 VITALS
WEIGHT: 200.63 LBS | BODY MASS INDEX: 28.72 KG/M2 | HEIGHT: 70 IN | TEMPERATURE: 98 F | HEART RATE: 84 BPM | SYSTOLIC BLOOD PRESSURE: 146 MMHG | DIASTOLIC BLOOD PRESSURE: 70 MMHG | RESPIRATION RATE: 20 BRPM | OXYGEN SATURATION: 98 %

## 2023-01-01 VITALS
WEIGHT: 178.56 LBS | SYSTOLIC BLOOD PRESSURE: 179 MMHG | HEART RATE: 88 BPM | TEMPERATURE: 98 F | OXYGEN SATURATION: 98 % | BODY MASS INDEX: 25.56 KG/M2 | HEIGHT: 70 IN | RESPIRATION RATE: 20 BRPM | DIASTOLIC BLOOD PRESSURE: 102 MMHG

## 2023-01-01 VITALS
HEIGHT: 71 IN | BODY MASS INDEX: 28.09 KG/M2 | TEMPERATURE: 99 F | HEART RATE: 107 BPM | WEIGHT: 200.63 LBS | DIASTOLIC BLOOD PRESSURE: 99 MMHG | SYSTOLIC BLOOD PRESSURE: 194 MMHG

## 2023-01-01 VITALS
BODY MASS INDEX: 28.72 KG/M2 | HEIGHT: 70 IN | OXYGEN SATURATION: 95 % | TEMPERATURE: 98 F | WEIGHT: 200.63 LBS | HEART RATE: 110 BPM | RESPIRATION RATE: 20 BRPM | SYSTOLIC BLOOD PRESSURE: 198 MMHG | DIASTOLIC BLOOD PRESSURE: 104 MMHG

## 2023-01-01 VITALS — WEIGHT: 178.56 LBS | HEIGHT: 70 IN | BODY MASS INDEX: 25.56 KG/M2

## 2023-01-01 VITALS
HEART RATE: 105 BPM | WEIGHT: 196.19 LBS | HEIGHT: 70 IN | BODY MASS INDEX: 28.09 KG/M2 | SYSTOLIC BLOOD PRESSURE: 151 MMHG | DIASTOLIC BLOOD PRESSURE: 79 MMHG | TEMPERATURE: 97 F | OXYGEN SATURATION: 95 % | RESPIRATION RATE: 20 BRPM

## 2023-01-01 VITALS
SYSTOLIC BLOOD PRESSURE: 160 MMHG | OXYGEN SATURATION: 90 % | BODY MASS INDEX: 26.27 KG/M2 | HEIGHT: 71 IN | HEART RATE: 114 BPM | DIASTOLIC BLOOD PRESSURE: 80 MMHG | WEIGHT: 187.63 LBS

## 2023-01-01 VITALS — BODY MASS INDEX: 25.56 KG/M2 | HEIGHT: 70 IN | WEIGHT: 178.56 LBS

## 2023-01-01 DIAGNOSIS — E11.22 CKD STAGE 5 DUE TO TYPE 2 DIABETES MELLITUS: Primary | Chronic | ICD-10-CM

## 2023-01-01 DIAGNOSIS — Z86.16 HISTORY OF SEVERE ACUTE RESPIRATORY SYNDROME CORONAVIRUS 2 (SARS-COV-2) DISEASE: ICD-10-CM

## 2023-01-01 DIAGNOSIS — Z48.24 ENCOUNTER FOR AFTERCARE FOLLOWING LUNG TRANSPLANT: Primary | ICD-10-CM

## 2023-01-01 DIAGNOSIS — E11.59 HYPERTENSION ASSOCIATED WITH DIABETES: Chronic | ICD-10-CM

## 2023-01-01 DIAGNOSIS — Z94.2 LUNG REPLACED BY TRANSPLANT: ICD-10-CM

## 2023-01-01 DIAGNOSIS — J4A.9 CHRONIC LUNG ALLOGRAFT DYSFUNCTION (CLAD): ICD-10-CM

## 2023-01-01 DIAGNOSIS — Z99.2 ESRD (END STAGE RENAL DISEASE) ON DIALYSIS: Primary | ICD-10-CM

## 2023-01-01 DIAGNOSIS — E11.69 COMBINED HYPERLIPIDEMIA ASSOCIATED WITH TYPE 2 DIABETES MELLITUS: Chronic | ICD-10-CM

## 2023-01-01 DIAGNOSIS — I27.9 CHRONIC PULMONARY HEART DISEASE: ICD-10-CM

## 2023-01-01 DIAGNOSIS — J44.9 CHRONIC OBSTRUCTIVE PULMONARY DISEASE, UNSPECIFIED COPD TYPE: ICD-10-CM

## 2023-01-01 DIAGNOSIS — N18.6 ESRD (END STAGE RENAL DISEASE) ON DIALYSIS: ICD-10-CM

## 2023-01-01 DIAGNOSIS — Z79.4 TYPE 2 DIABETES MELLITUS WITH HYPERGLYCEMIA, WITH LONG-TERM CURRENT USE OF INSULIN: ICD-10-CM

## 2023-01-01 DIAGNOSIS — J96.22 ACUTE ON CHRONIC RESPIRATORY FAILURE WITH HYPOXIA AND HYPERCAPNIA: ICD-10-CM

## 2023-01-01 DIAGNOSIS — R00.1 BRADYCARDIA: ICD-10-CM

## 2023-01-01 DIAGNOSIS — Z94.2 LUNG TRANSPLANTED: ICD-10-CM

## 2023-01-01 DIAGNOSIS — Z79.899 ENCOUNTER FOR LONG-TERM (CURRENT) USE OF MEDICATIONS: ICD-10-CM

## 2023-01-01 DIAGNOSIS — E78.2 COMBINED HYPERLIPIDEMIA ASSOCIATED WITH TYPE 2 DIABETES MELLITUS: Chronic | ICD-10-CM

## 2023-01-01 DIAGNOSIS — N18.6 ESRD ON HEMODIALYSIS: ICD-10-CM

## 2023-01-01 DIAGNOSIS — D84.9 IMMUNOSUPPRESSION: ICD-10-CM

## 2023-01-01 DIAGNOSIS — E11.65 TYPE 2 DIABETES MELLITUS WITH HYPERGLYCEMIA, WITH LONG-TERM CURRENT USE OF INSULIN: ICD-10-CM

## 2023-01-01 DIAGNOSIS — N18.6: Primary | ICD-10-CM

## 2023-01-01 DIAGNOSIS — J96.01 ACUTE HYPOXEMIC RESPIRATORY FAILURE: ICD-10-CM

## 2023-01-01 DIAGNOSIS — Z94.2 LUNG REPLACED BY TRANSPLANT: Primary | ICD-10-CM

## 2023-01-01 DIAGNOSIS — J96.21 ACUTE ON CHRONIC RESPIRATORY FAILURE WITH HYPOXIA AND HYPERCAPNIA: ICD-10-CM

## 2023-01-01 DIAGNOSIS — D86.9 SARCOIDOSIS: ICD-10-CM

## 2023-01-01 DIAGNOSIS — R00.0 TACHYCARDIA: Primary | ICD-10-CM

## 2023-01-01 DIAGNOSIS — Z94.2 LUNG TRANSPLANTED: Primary | ICD-10-CM

## 2023-01-01 DIAGNOSIS — Z99.2 ESRD ON HEMODIALYSIS: ICD-10-CM

## 2023-01-01 DIAGNOSIS — J96.12 CHRONIC RESPIRATORY FAILURE WITH HYPOXIA AND HYPERCAPNIA: ICD-10-CM

## 2023-01-01 DIAGNOSIS — E11.65 TYPE 2 DIABETES MELLITUS WITH HYPERGLYCEMIA, WITH LONG-TERM CURRENT USE OF INSULIN: Chronic | ICD-10-CM

## 2023-01-01 DIAGNOSIS — Z79.4 TYPE 2 DIABETES MELLITUS WITH HYPERGLYCEMIA, WITH LONG-TERM CURRENT USE OF INSULIN: Chronic | ICD-10-CM

## 2023-01-01 DIAGNOSIS — D63.1 ANEMIA DUE TO CHRONIC KIDNEY DISEASE, UNSPECIFIED CKD STAGE: ICD-10-CM

## 2023-01-01 DIAGNOSIS — Z79.2 NEED FOR PROPHYLACTIC ANTIBIOTIC: ICD-10-CM

## 2023-01-01 DIAGNOSIS — J96.12 HYPERCAPNIC RESPIRATORY FAILURE, CHRONIC: ICD-10-CM

## 2023-01-01 DIAGNOSIS — N18.6 ESRD (END STAGE RENAL DISEASE): ICD-10-CM

## 2023-01-01 DIAGNOSIS — Z79.2 PROPHYLACTIC ANTIBIOTIC: ICD-10-CM

## 2023-01-01 DIAGNOSIS — T86.818 BRONCHIOLITIS OBLITERANS SYNDROME DUE TO LUNG TRANSPLANTATION: Primary | ICD-10-CM

## 2023-01-01 DIAGNOSIS — Z00.00 ENCOUNTER FOR MEDICARE ANNUAL WELLNESS EXAM: ICD-10-CM

## 2023-01-01 DIAGNOSIS — I48.0 PAROXYSMAL ATRIAL FIBRILLATION: ICD-10-CM

## 2023-01-01 DIAGNOSIS — I70.0 AORTIC ATHEROSCLEROSIS: ICD-10-CM

## 2023-01-01 DIAGNOSIS — Z99.2 ESRD ON HEMODIALYSIS: Primary | ICD-10-CM

## 2023-01-01 DIAGNOSIS — I27.29 PULMONARY HYPERTENSION ASSOCIATED WITH SARCOIDOSIS: ICD-10-CM

## 2023-01-01 DIAGNOSIS — G47.33 OBSTRUCTIVE SLEEP APNEA SYNDROME: ICD-10-CM

## 2023-01-01 DIAGNOSIS — E11.36 DIABETIC CATARACT: ICD-10-CM

## 2023-01-01 DIAGNOSIS — Z99.2 ESRD (END STAGE RENAL DISEASE) ON DIALYSIS: ICD-10-CM

## 2023-01-01 DIAGNOSIS — Z12.83 SKIN CANCER SCREENING: ICD-10-CM

## 2023-01-01 DIAGNOSIS — J44.81 BRONCHIOLITIS OBLITERANS SYNDROME DUE TO LUNG TRANSPLANTATION: Primary | ICD-10-CM

## 2023-01-01 DIAGNOSIS — J96.11 CHRONIC HYPOXEMIC RESPIRATORY FAILURE: Primary | ICD-10-CM

## 2023-01-01 DIAGNOSIS — N18.6 ESRD ON HEMODIALYSIS: Primary | ICD-10-CM

## 2023-01-01 DIAGNOSIS — I12.0: Primary | ICD-10-CM

## 2023-01-01 DIAGNOSIS — E55.9 VITAMIN D INSUFFICIENCY: Primary | ICD-10-CM

## 2023-01-01 DIAGNOSIS — J96.11 CHRONIC RESPIRATORY FAILURE WITH HYPOXIA: ICD-10-CM

## 2023-01-01 DIAGNOSIS — K21.9 GASTROESOPHAGEAL REFLUX DISEASE WITHOUT ESOPHAGITIS: ICD-10-CM

## 2023-01-01 DIAGNOSIS — N18.9 ANEMIA DUE TO CHRONIC KIDNEY DISEASE, UNSPECIFIED CKD STAGE: ICD-10-CM

## 2023-01-01 DIAGNOSIS — Z79.899 MEDICATION MANAGEMENT: ICD-10-CM

## 2023-01-01 DIAGNOSIS — J96.11 CHRONIC RESPIRATORY FAILURE WITH HYPOXIA AND HYPERCAPNIA: ICD-10-CM

## 2023-01-01 DIAGNOSIS — N18.6 ESRD (END STAGE RENAL DISEASE) ON DIALYSIS: Primary | ICD-10-CM

## 2023-01-01 DIAGNOSIS — G47.00 INSOMNIA, UNSPECIFIED TYPE: ICD-10-CM

## 2023-01-01 DIAGNOSIS — T82.858A STENOSIS OF ARTERIOVENOUS DIALYSIS FISTULA, INITIAL ENCOUNTER: Primary | ICD-10-CM

## 2023-01-01 DIAGNOSIS — N25.81 SECONDARY HYPERPARATHYROIDISM OF RENAL ORIGIN: ICD-10-CM

## 2023-01-01 DIAGNOSIS — B25.8 OTHER CYTOMEGALOVIRAL DISEASES: ICD-10-CM

## 2023-01-01 DIAGNOSIS — H52.7 REFRACTIVE ERRORS: ICD-10-CM

## 2023-01-01 DIAGNOSIS — E83.42 HYPOMAGNESEMIA: ICD-10-CM

## 2023-01-01 DIAGNOSIS — J18.9 BILATERAL PNEUMONIA: ICD-10-CM

## 2023-01-01 DIAGNOSIS — Z48.24 ENCOUNTER FOR AFTERCARE FOLLOWING LUNG TRANSPLANT: ICD-10-CM

## 2023-01-01 DIAGNOSIS — E11.9 DIABETES MELLITUS TYPE 2 WITHOUT RETINOPATHY: Primary | ICD-10-CM

## 2023-01-01 DIAGNOSIS — E66.09 CLASS 1 OBESITY DUE TO EXCESS CALORIES WITH SERIOUS COMORBIDITY AND BODY MASS INDEX (BMI) OF 30.0 TO 30.9 IN ADULT: ICD-10-CM

## 2023-01-01 DIAGNOSIS — Z09 HOSPITAL DISCHARGE FOLLOW-UP: Primary | ICD-10-CM

## 2023-01-01 DIAGNOSIS — I15.2 HYPERTENSION ASSOCIATED WITH DIABETES: Chronic | ICD-10-CM

## 2023-01-01 DIAGNOSIS — J96.21 ACUTE ON CHRONIC RESPIRATORY FAILURE WITH HYPOXIA: Primary | ICD-10-CM

## 2023-01-01 DIAGNOSIS — K21.9 GASTROESOPHAGEAL REFLUX DISEASE, UNSPECIFIED WHETHER ESOPHAGITIS PRESENT: ICD-10-CM

## 2023-01-01 DIAGNOSIS — T82.858D STENOSIS OF ARTERIOVENOUS DIALYSIS FISTULA, SUBSEQUENT ENCOUNTER: ICD-10-CM

## 2023-01-01 DIAGNOSIS — Z00.00 ENCOUNTER FOR MEDICARE ANNUAL WELLNESS EXAM: Primary | ICD-10-CM

## 2023-01-01 DIAGNOSIS — Z99.2 ARTERIOVENOUS FISTULA FOR HEMODIALYSIS IN PLACE, PRIMARY: Primary | ICD-10-CM

## 2023-01-01 DIAGNOSIS — D86.9 PULMONARY HYPERTENSION ASSOCIATED WITH SARCOIDOSIS: ICD-10-CM

## 2023-01-01 DIAGNOSIS — I10 HYPERTENSION, UNSPECIFIED TYPE: ICD-10-CM

## 2023-01-01 DIAGNOSIS — N18.5 CKD STAGE 5 DUE TO TYPE 2 DIABETES MELLITUS: Primary | Chronic | ICD-10-CM

## 2023-01-01 DIAGNOSIS — Z87.19 HISTORY OF PANCREATITIS: ICD-10-CM

## 2023-01-01 LAB
1,3 BETA GLUCAN SER-MCNC: 232 PG/ML
1,3 BETA GLUCAN SER-MCNC: ABNORMAL NG/ML
ADENOVIRUS: NOT DETECTED
ALBUMIN SERPL BCP-MCNC: 3 G/DL (ref 3.5–5.2)
ALBUMIN SERPL BCP-MCNC: 3 G/DL (ref 3.5–5.2)
ALBUMIN SERPL BCP-MCNC: 3.1 G/DL (ref 3.5–5.2)
ALBUMIN SERPL BCP-MCNC: 3.2 G/DL (ref 3.5–5.2)
ALBUMIN SERPL BCP-MCNC: 3.3 G/DL (ref 3.5–5.2)
ALBUMIN SERPL BCP-MCNC: 3.5 G/DL (ref 3.5–5.2)
ALLENS TEST: ABNORMAL
ALP SERPL-CCNC: 100 U/L (ref 55–135)
ALP SERPL-CCNC: 100 U/L (ref 55–135)
ALP SERPL-CCNC: 102 U/L (ref 55–135)
ALP SERPL-CCNC: 102 U/L (ref 55–135)
ALP SERPL-CCNC: 105 U/L (ref 55–135)
ALP SERPL-CCNC: 124 U/L (ref 55–135)
ALP SERPL-CCNC: 84 U/L (ref 55–135)
ALP SERPL-CCNC: 87 U/L (ref 55–135)
ALP SERPL-CCNC: 87 U/L (ref 55–135)
ALP SERPL-CCNC: 89 U/L (ref 55–135)
ALP SERPL-CCNC: 89 U/L (ref 55–135)
ALP SERPL-CCNC: 90 U/L (ref 55–135)
ALP SERPL-CCNC: 93 U/L (ref 55–135)
ALP SERPL-CCNC: 94 U/L (ref 55–135)
ALP SERPL-CCNC: 95 U/L (ref 55–135)
ALT SERPL W/O P-5'-P-CCNC: 11 U/L (ref 10–44)
ALT SERPL W/O P-5'-P-CCNC: 12 U/L (ref 10–44)
ALT SERPL W/O P-5'-P-CCNC: 13 U/L (ref 10–44)
ALT SERPL W/O P-5'-P-CCNC: 14 U/L (ref 10–44)
ALT SERPL W/O P-5'-P-CCNC: 15 U/L (ref 10–44)
ALT SERPL W/O P-5'-P-CCNC: 16 U/L (ref 10–44)
ALT SERPL W/O P-5'-P-CCNC: 17 U/L (ref 10–44)
ALT SERPL W/O P-5'-P-CCNC: 19 U/L (ref 10–44)
ALT SERPL W/O P-5'-P-CCNC: 25 U/L (ref 10–44)
ALT SERPL W/O P-5'-P-CCNC: 9 U/L (ref 10–44)
ANION GAP SERPL CALC-SCNC: 11 MMOL/L (ref 8–16)
ANION GAP SERPL CALC-SCNC: 11 MMOL/L (ref 8–16)
ANION GAP SERPL CALC-SCNC: 12 MMOL/L (ref 8–16)
ANION GAP SERPL CALC-SCNC: 13 MMOL/L (ref 8–16)
ANION GAP SERPL CALC-SCNC: 14 MMOL/L (ref 8–16)
ANION GAP SERPL CALC-SCNC: 15 MMOL/L (ref 8–16)
ANION GAP SERPL CALC-SCNC: 17 MMOL/L (ref 8–16)
ANISOCYTOSIS BLD QL SMEAR: SLIGHT
ANISOCYTOSIS BLD QL SMEAR: SLIGHT
AST SERPL-CCNC: 13 U/L (ref 10–40)
AST SERPL-CCNC: 13 U/L (ref 10–40)
AST SERPL-CCNC: 15 U/L (ref 10–40)
AST SERPL-CCNC: 16 U/L (ref 10–40)
AST SERPL-CCNC: 16 U/L (ref 10–40)
AST SERPL-CCNC: 17 U/L (ref 10–40)
AST SERPL-CCNC: 18 U/L (ref 10–40)
AST SERPL-CCNC: 19 U/L (ref 10–40)
AST SERPL-CCNC: 19 U/L (ref 10–40)
AST SERPL-CCNC: 20 U/L (ref 10–40)
AST SERPL-CCNC: 21 U/L (ref 10–40)
AST SERPL-CCNC: 23 U/L (ref 10–40)
BASOPHILS # BLD AUTO: 0 K/UL (ref 0–0.2)
BASOPHILS # BLD AUTO: 0.01 K/UL (ref 0–0.2)
BASOPHILS # BLD AUTO: 0.02 K/UL (ref 0–0.2)
BASOPHILS # BLD AUTO: 0.03 K/UL (ref 0–0.2)
BASOPHILS # BLD AUTO: 0.04 K/UL (ref 0–0.2)
BASOPHILS # BLD AUTO: 0.04 K/UL (ref 0–0.2)
BASOPHILS # BLD AUTO: 0.06 K/UL (ref 0–0.2)
BASOPHILS # BLD AUTO: 0.08 K/UL (ref 0–0.2)
BASOPHILS # BLD AUTO: 0.08 K/UL (ref 0–0.2)
BASOPHILS NFR BLD: 0 % (ref 0–1.9)
BASOPHILS NFR BLD: 0 % (ref 0–1.9)
BASOPHILS NFR BLD: 0.1 % (ref 0–1.9)
BASOPHILS NFR BLD: 0.2 % (ref 0–1.9)
BASOPHILS NFR BLD: 0.4 % (ref 0–1.9)
BASOPHILS NFR BLD: 0.4 % (ref 0–1.9)
BASOPHILS NFR BLD: 0.5 % (ref 0–1.9)
BASOPHILS NFR BLD: 0.8 % (ref 0–1.9)
BILIRUB DIRECT SERPL-MCNC: 0.1 MG/DL (ref 0.1–0.3)
BILIRUB DIRECT SERPL-MCNC: 0.2 MG/DL (ref 0.1–0.3)
BILIRUB SERPL-MCNC: 0.3 MG/DL (ref 0.1–1)
BILIRUB SERPL-MCNC: 0.4 MG/DL (ref 0.1–1)
BILIRUB SERPL-MCNC: 0.5 MG/DL (ref 0.1–1)
BORDETELLA PARAPERTUSSIS (IS1001): NOT DETECTED
BORDETELLA PERTUSSIS (PTXP): NOT DETECTED
BUN SERPL-MCNC: 18 MG/DL (ref 6–20)
BUN SERPL-MCNC: 21 MG/DL (ref 6–20)
BUN SERPL-MCNC: 21 MG/DL (ref 6–20)
BUN SERPL-MCNC: 26 MG/DL (ref 6–20)
BUN SERPL-MCNC: 27 MG/DL (ref 6–20)
BUN SERPL-MCNC: 44 MG/DL (ref 6–20)
BUN SERPL-MCNC: 46 MG/DL (ref 6–20)
BUN SERPL-MCNC: 49 MG/DL (ref 6–20)
BUN SERPL-MCNC: 53 MG/DL (ref 6–20)
BUN SERPL-MCNC: 57 MG/DL (ref 6–20)
BUN SERPL-MCNC: 68 MG/DL (ref 6–20)
BUN SERPL-MCNC: 68 MG/DL (ref 6–20)
BUN SERPL-MCNC: 70 MG/DL (ref 6–20)
BUN SERPL-MCNC: 71 MG/DL (ref 6–20)
BUN SERPL-MCNC: 80 MG/DL (ref 6–20)
BUN SERPL-MCNC: 94 MG/DL (ref 6–20)
CA-I BLDV-SCNC: 1.2 MMOL/L (ref 1.06–1.42)
CALCIUM SERPL-MCNC: 8.6 MG/DL (ref 8.7–10.5)
CALCIUM SERPL-MCNC: 8.6 MG/DL (ref 8.7–10.5)
CALCIUM SERPL-MCNC: 8.8 MG/DL (ref 8.7–10.5)
CALCIUM SERPL-MCNC: 8.9 MG/DL (ref 8.7–10.5)
CALCIUM SERPL-MCNC: 8.9 MG/DL (ref 8.7–10.5)
CALCIUM SERPL-MCNC: 9.2 MG/DL (ref 8.7–10.5)
CALCIUM SERPL-MCNC: 9.3 MG/DL (ref 8.7–10.5)
CALCIUM SERPL-MCNC: 9.5 MG/DL (ref 8.7–10.5)
CALCIUM SERPL-MCNC: 9.6 MG/DL (ref 8.7–10.5)
CALCIUM SERPL-MCNC: 9.7 MG/DL (ref 8.7–10.5)
CHLAMYDIA PNEUMONIAE: NOT DETECTED
CHLORIDE SERPL-SCNC: 100 MMOL/L (ref 95–110)
CHLORIDE SERPL-SCNC: 100 MMOL/L (ref 95–110)
CHLORIDE SERPL-SCNC: 101 MMOL/L (ref 95–110)
CHLORIDE SERPL-SCNC: 102 MMOL/L (ref 95–110)
CHLORIDE SERPL-SCNC: 103 MMOL/L (ref 95–110)
CHLORIDE SERPL-SCNC: 103 MMOL/L (ref 95–110)
CHLORIDE SERPL-SCNC: 104 MMOL/L (ref 95–110)
CHLORIDE SERPL-SCNC: 93 MMOL/L (ref 95–110)
CHLORIDE SERPL-SCNC: 99 MMOL/L (ref 95–110)
CMV DNA SPEC QL NAA+PROBE: NOT DETECTED
CO2 SERPL-SCNC: 17 MMOL/L (ref 23–29)
CO2 SERPL-SCNC: 18 MMOL/L (ref 23–29)
CO2 SERPL-SCNC: 19 MMOL/L (ref 23–29)
CO2 SERPL-SCNC: 19 MMOL/L (ref 23–29)
CO2 SERPL-SCNC: 20 MMOL/L (ref 23–29)
CO2 SERPL-SCNC: 21 MMOL/L (ref 23–29)
CO2 SERPL-SCNC: 22 MMOL/L (ref 23–29)
CO2 SERPL-SCNC: 22 MMOL/L (ref 23–29)
CO2 SERPL-SCNC: 24 MMOL/L (ref 23–29)
CO2 SERPL-SCNC: 25 MMOL/L (ref 23–29)
CO2 SERPL-SCNC: 27 MMOL/L (ref 23–29)
CO2 SERPL-SCNC: 28 MMOL/L (ref 23–29)
CO2 SERPL-SCNC: 29 MMOL/L (ref 23–29)
CORONAVIRUS 229E, COMMON COLD VIRUS: NOT DETECTED
CORONAVIRUS HKU1, COMMON COLD VIRUS: NOT DETECTED
CORONAVIRUS NL63, COMMON COLD VIRUS: NOT DETECTED
CORONAVIRUS OC43, COMMON COLD VIRUS: NOT DETECTED
CREAT SERPL-MCNC: 10 MG/DL (ref 0.5–1.4)
CREAT SERPL-MCNC: 10 MG/DL (ref 0.5–1.4)
CREAT SERPL-MCNC: 4 MG/DL (ref 0.5–1.4)
CREAT SERPL-MCNC: 4.8 MG/DL (ref 0.5–1.4)
CREAT SERPL-MCNC: 5.2 MG/DL (ref 0.5–1.4)
CREAT SERPL-MCNC: 5.6 MG/DL (ref 0.5–1.4)
CREAT SERPL-MCNC: 5.9 MG/DL (ref 0.5–1.4)
CREAT SERPL-MCNC: 6 MG/DL (ref 0.5–1.4)
CREAT SERPL-MCNC: 6.8 MG/DL (ref 0.5–1.4)
CREAT SERPL-MCNC: 7.3 MG/DL (ref 0.5–1.4)
CREAT SERPL-MCNC: 7.4 MG/DL (ref 0.5–1.4)
CREAT SERPL-MCNC: 7.6 MG/DL (ref 0.5–1.4)
CREAT SERPL-MCNC: 8.4 MG/DL (ref 0.5–1.4)
CREAT SERPL-MCNC: 9 MG/DL (ref 0.5–1.4)
CREAT SERPL-MCNC: 9.4 MG/DL (ref 0.5–1.4)
CREAT SERPL-MCNC: 9.5 MG/DL (ref 0.5–1.4)
CRYPTOC AG SER QL LA: NEGATIVE
CYTOMEGALOVIRUS LOG (IU/ML): NOT DETECTED LOGIU/ML
CYTOMEGALOVIRUS PCR, QUANT: NOT DETECTED IU/ML
DELSYS: ABNORMAL
DIFFERENTIAL METHOD: ABNORMAL
DLCO SINGLE BREATH LLN: 22.23
DLCO SINGLE BREATH PRE REF: 18.1 %
DLCO SINGLE BREATH REF: 29.15
DLCOC SBVA LLN: 2.91
DLCOC SBVA REF: 4.09
DLCOC SINGLE BREATH LLN: 22.23
DLCOC SINGLE BREATH REF: 29.15
DLCOCSBVAULN: 5.27
DLCOCSINGLEBREATHULN: 36.08
DLCOSINGLEBREATHULN: 36.08
DLCOVA LLN: 2.91
DLCOVA PRE REF: 91 %
DLCOVA PRE: 3.72 ML/(MIN*MMHG*L) (ref 2.91–5.27)
DLCOVA REF: 4.09
DLCOVAULN: 5.27
EOSINOPHIL # BLD AUTO: 0 K/UL (ref 0–0.5)
EOSINOPHIL # BLD AUTO: 0.2 K/UL (ref 0–0.5)
EOSINOPHIL # BLD AUTO: 0.2 K/UL (ref 0–0.5)
EOSINOPHIL # BLD AUTO: 0.4 K/UL (ref 0–0.5)
EOSINOPHIL # BLD AUTO: 0.5 K/UL (ref 0–0.5)
EOSINOPHIL # BLD AUTO: 0.6 K/UL (ref 0–0.5)
EOSINOPHIL NFR BLD: 0 % (ref 0–8)
EOSINOPHIL NFR BLD: 0.4 % (ref 0–8)
EOSINOPHIL NFR BLD: 0.4 % (ref 0–8)
EOSINOPHIL NFR BLD: 1.8 % (ref 0–8)
EOSINOPHIL NFR BLD: 2.2 % (ref 0–8)
EOSINOPHIL NFR BLD: 3.8 % (ref 0–8)
EOSINOPHIL NFR BLD: 4.4 % (ref 0–8)
EOSINOPHIL NFR BLD: 5.6 % (ref 0–8)
EOSINOPHIL NFR BLD: 5.6 % (ref 0–8)
EOSINOPHIL NFR BLD: 5.9 % (ref 0–8)
EP: 5
EP: 5
ERV LLN: -16448.81
ERV PRE REF: 34.7 %
ERV REF: 1.19
ERVULN: ABNORMAL
ERYTHROCYTE [DISTWIDTH] IN BLOOD BY AUTOMATED COUNT: 12.4 % (ref 11.5–14.5)
ERYTHROCYTE [DISTWIDTH] IN BLOOD BY AUTOMATED COUNT: 12.4 % (ref 11.5–14.5)
ERYTHROCYTE [DISTWIDTH] IN BLOOD BY AUTOMATED COUNT: 12.5 % (ref 11.5–14.5)
ERYTHROCYTE [DISTWIDTH] IN BLOOD BY AUTOMATED COUNT: 12.5 % (ref 11.5–14.5)
ERYTHROCYTE [DISTWIDTH] IN BLOOD BY AUTOMATED COUNT: 12.6 % (ref 11.5–14.5)
ERYTHROCYTE [DISTWIDTH] IN BLOOD BY AUTOMATED COUNT: 12.7 % (ref 11.5–14.5)
ERYTHROCYTE [DISTWIDTH] IN BLOOD BY AUTOMATED COUNT: 12.8 % (ref 11.5–14.5)
ERYTHROCYTE [DISTWIDTH] IN BLOOD BY AUTOMATED COUNT: 12.9 % (ref 11.5–14.5)
ERYTHROCYTE [DISTWIDTH] IN BLOOD BY AUTOMATED COUNT: 12.9 % (ref 11.5–14.5)
ERYTHROCYTE [SEDIMENTATION RATE] IN BLOOD BY WESTERGREN METHOD: 19 MM/H
ERYTHROCYTE [SEDIMENTATION RATE] IN BLOOD BY WESTERGREN METHOD: 20 MM/H
ERYTHROCYTE [SEDIMENTATION RATE] IN BLOOD BY WESTERGREN METHOD: 20 MM/H
EST. GFR  (NO RACE VARIABLE): 10 ML/MIN/1.73 M^2
EST. GFR  (NO RACE VARIABLE): 10.2 ML/MIN/1.73 M^2
EST. GFR  (NO RACE VARIABLE): 10.9 ML/MIN/1.73 M^2
EST. GFR  (NO RACE VARIABLE): 11.9 ML/MIN/1.73 M^2
EST. GFR  (NO RACE VARIABLE): 13.1 ML/MIN/1.73 M^2
EST. GFR  (NO RACE VARIABLE): 16.3 ML/MIN/1.73 M^2
EST. GFR  (NO RACE VARIABLE): 5.4 ML/MIN/1.73 M^2
EST. GFR  (NO RACE VARIABLE): 5.4 ML/MIN/1.73 M^2
EST. GFR  (NO RACE VARIABLE): 5.8 ML/MIN/1.73 M^2
EST. GFR  (NO RACE VARIABLE): 5.9 ML/MIN/1.73 M^2
EST. GFR  (NO RACE VARIABLE): 6.2 ML/MIN/1.73 M^2
EST. GFR  (NO RACE VARIABLE): 6.7 ML/MIN/1.73 M^2
EST. GFR  (NO RACE VARIABLE): 7.6 ML/MIN/1.73 M^2
EST. GFR  (NO RACE VARIABLE): 7.8 ML/MIN/1.73 M^2
EST. GFR  (NO RACE VARIABLE): 7.9 ML/MIN/1.73 M^2
EST. GFR  (NO RACE VARIABLE): 8.6 ML/MIN/1.73 M^2
ESTIMATED AVG GLUCOSE: 114 MG/DL (ref 68–131)
EVEROLIMUS BLD-MCNC: 1.3 NG/ML
EVEROLIMUS BLD-MCNC: 2.5 NG/ML
EVEROLIMUS BLD-MCNC: 2.6 NG/ML
EVEROLIMUS BLD-MCNC: <1 NG/ML
EVEROLIMUS BLD-MCNC: NORMAL NG/ML
FEF 25 75 LLN: 1.43
FEF 25 75 LLN: 1.44
FEF 25 75 LLN: 1.45
FEF 25 75 LLN: 1.46
FEF 25 75 LLN: 1.46
FEF 25 75 LLN: 1.47
FEF 25 75 PRE REF: 17.3 %
FEF 25 75 PRE REF: 19.1 %
FEF 25 75 PRE REF: 19.6 %
FEF 25 75 PRE REF: 19.7 %
FEF 25 75 PRE REF: 20.3 %
FEF 25 75 PRE REF: 20.5 %
FEF 25 75 REF: 2.94
FEF 25 75 REF: 2.95
FEF 25 75 REF: 2.96
FEF 25 75 REF: 2.98
FEV05 LLN: 1.68
FEV05 REF: 2.81
FEV1 FVC LLN: 65
FEV1 FVC PRE REF: 101.5 %
FEV1 FVC PRE REF: 105.1 %
FEV1 FVC PRE REF: 89.8 %
FEV1 FVC PRE REF: 95.6 %
FEV1 FVC PRE REF: 95.8 %
FEV1 FVC PRE REF: 98 %
FEV1 FVC REF: 77
FEV1 LLN: 2.68
FEV1 LLN: 2.68
FEV1 LLN: 2.69
FEV1 LLN: 2.7
FEV1 PRE REF: 17.6 %
FEV1 PRE REF: 20.6 %
FEV1 PRE REF: 21.7 %
FEV1 PRE REF: 23 %
FEV1 PRE REF: 23.3 %
FEV1 PRE REF: 24.7 %
FEV1 REF: 3.56
FEV1 REF: 3.56
FEV1 REF: 3.57
FEV1 REF: 3.58
FIO2: 40
FIO2: 60
FIO2: 9435
FLOW: 2
FLOW: 30
FLUBV RNA NPH QL NAA+NON-PROBE: NOT DETECTED
FRCPLETH LLN: 2.62
FRCPLETH PREREF: 53 %
FRCPLETH REF: 3.61
FRCPLETHULN: 4.6
FUNGITELL COMMENTS: ABNORMAL
FUNGITELL COMMENTS: POSITIVE
FVC LLN: 3.51
FVC LLN: 3.51
FVC LLN: 3.52
FVC LLN: 3.53
FVC LLN: 3.54
FVC LLN: 3.54
FVC PRE REF: 16.7 %
FVC PRE REF: 20.2 %
FVC PRE REF: 22.1 %
FVC PRE REF: 24 %
FVC PRE REF: 24.3 %
FVC PRE REF: 27.5 %
FVC REF: 4.61
FVC REF: 4.61
FVC REF: 4.62
FVC REF: 4.63
FVC REF: 4.64
FVC REF: 4.64
GALACTOMANNAN AG SERPL IA-ACNC: <0.5 INDEX
GLUCOSE SERPL-MCNC: 110 MG/DL (ref 70–110)
GLUCOSE SERPL-MCNC: 119 MG/DL (ref 70–110)
GLUCOSE SERPL-MCNC: 123 MG/DL (ref 70–110)
GLUCOSE SERPL-MCNC: 124 MG/DL (ref 70–110)
GLUCOSE SERPL-MCNC: 125 MG/DL (ref 70–110)
GLUCOSE SERPL-MCNC: 127 MG/DL (ref 70–110)
GLUCOSE SERPL-MCNC: 141 MG/DL (ref 70–110)
GLUCOSE SERPL-MCNC: 142 MG/DL (ref 70–110)
GLUCOSE SERPL-MCNC: 158 MG/DL (ref 70–110)
GLUCOSE SERPL-MCNC: 158 MG/DL (ref 70–110)
GLUCOSE SERPL-MCNC: 159 MG/DL (ref 70–110)
GLUCOSE SERPL-MCNC: 176 MG/DL (ref 70–110)
GLUCOSE SERPL-MCNC: 177 MG/DL (ref 70–110)
GLUCOSE SERPL-MCNC: 177 MG/DL (ref 70–110)
GLUCOSE SERPL-MCNC: 204 MG/DL (ref 70–110)
GLUCOSE SERPL-MCNC: 212 MG/DL (ref 70–110)
HBA1C MFR BLD: 5.6 % (ref 4–5.6)
HBV SURFACE AG SERPL QL IA: NORMAL
HCO3 UR-SCNC: 25.1 MMOL/L (ref 24–28)
HCO3 UR-SCNC: 25.6 MMOL/L (ref 24–28)
HCO3 UR-SCNC: 31.8 MMOL/L (ref 24–28)
HCO3 UR-SCNC: 32.5 MMOL/L (ref 24–28)
HCO3 UR-SCNC: 34.8 MMOL/L (ref 24–28)
HCT VFR BLD AUTO: 28.5 % (ref 40–54)
HCT VFR BLD AUTO: 29.1 % (ref 40–54)
HCT VFR BLD AUTO: 30.9 % (ref 40–54)
HCT VFR BLD AUTO: 31.1 % (ref 40–54)
HCT VFR BLD AUTO: 32.2 % (ref 40–54)
HCT VFR BLD AUTO: 32.3 % (ref 40–54)
HCT VFR BLD AUTO: 32.9 % (ref 40–54)
HCT VFR BLD AUTO: 33.8 % (ref 40–54)
HCT VFR BLD AUTO: 34.3 % (ref 40–54)
HCT VFR BLD AUTO: 35.3 % (ref 40–54)
HCT VFR BLD AUTO: 36.3 % (ref 40–54)
HCT VFR BLD AUTO: 37.7 % (ref 40–54)
HCT VFR BLD AUTO: 38.8 % (ref 40–54)
HGB BLD-MCNC: 10 G/DL (ref 14–18)
HGB BLD-MCNC: 10.1 G/DL (ref 14–18)
HGB BLD-MCNC: 10.2 G/DL (ref 14–18)
HGB BLD-MCNC: 10.4 G/DL (ref 14–18)
HGB BLD-MCNC: 10.7 G/DL (ref 14–18)
HGB BLD-MCNC: 10.9 G/DL (ref 14–18)
HGB BLD-MCNC: 11.3 G/DL (ref 14–18)
HGB BLD-MCNC: 11.6 G/DL (ref 14–18)
HGB BLD-MCNC: 12 G/DL (ref 14–18)
HGB BLD-MCNC: 8.9 G/DL (ref 14–18)
HGB BLD-MCNC: 9.1 G/DL (ref 14–18)
HGB BLD-MCNC: 9.3 G/DL (ref 14–18)
HGB BLD-MCNC: 9.4 G/DL (ref 14–18)
HPIV1 RNA NPH QL NAA+NON-PROBE: NOT DETECTED
HPIV2 RNA NPH QL NAA+NON-PROBE: NOT DETECTED
HPIV3 RNA NPH QL NAA+NON-PROBE: NOT DETECTED
HPIV4 RNA NPH QL NAA+NON-PROBE: NOT DETECTED
HUMAN METAPNEUMOVIRUS: NOT DETECTED
HYPOCHROMIA BLD QL SMEAR: ABNORMAL
IMM GRANULOCYTES # BLD AUTO: 0.04 K/UL (ref 0–0.04)
IMM GRANULOCYTES # BLD AUTO: 0.05 K/UL (ref 0–0.04)
IMM GRANULOCYTES # BLD AUTO: 0.06 K/UL (ref 0–0.04)
IMM GRANULOCYTES # BLD AUTO: 0.06 K/UL (ref 0–0.04)
IMM GRANULOCYTES # BLD AUTO: 0.1 K/UL (ref 0–0.04)
IMM GRANULOCYTES # BLD AUTO: 0.1 K/UL (ref 0–0.04)
IMM GRANULOCYTES # BLD AUTO: 0.11 K/UL (ref 0–0.04)
IMM GRANULOCYTES # BLD AUTO: 0.23 K/UL (ref 0–0.04)
IMM GRANULOCYTES # BLD AUTO: 0.35 K/UL (ref 0–0.04)
IMM GRANULOCYTES # BLD AUTO: ABNORMAL K/UL (ref 0–0.04)
IMM GRANULOCYTES NFR BLD AUTO: 0.5 % (ref 0–0.5)
IMM GRANULOCYTES NFR BLD AUTO: 0.5 % (ref 0–0.5)
IMM GRANULOCYTES NFR BLD AUTO: 0.6 % (ref 0–0.5)
IMM GRANULOCYTES NFR BLD AUTO: 0.7 % (ref 0–0.5)
IMM GRANULOCYTES NFR BLD AUTO: 0.8 % (ref 0–0.5)
IMM GRANULOCYTES NFR BLD AUTO: 1.1 % (ref 0–0.5)
IMM GRANULOCYTES NFR BLD AUTO: 1.3 % (ref 0–0.5)
IMM GRANULOCYTES NFR BLD AUTO: 1.3 % (ref 0–0.5)
IMM GRANULOCYTES NFR BLD AUTO: 2.3 % (ref 0–0.5)
IMM GRANULOCYTES NFR BLD AUTO: 3.4 % (ref 0–0.5)
IMM GRANULOCYTES NFR BLD AUTO: ABNORMAL % (ref 0–0.5)
INR PPP: 0.9 (ref 0.8–1.2)
IP: 16
IP: 18
IVC PRE: 0.99 L (ref 3.52–5.74)
IVC SINGLE BREATH LLN: 3.52
IVC SINGLE BREATH PRE REF: 21.4 %
IVC SINGLE BREATH REF: 4.62
IVCSINGLEBREATHULN: 5.74
LACTATE SERPL-SCNC: 0.7 MMOL/L (ref 0.5–2.2)
LLN IC: -9999996.68
LYMPHOCYTES # BLD AUTO: 0.4 K/UL (ref 1–4.8)
LYMPHOCYTES # BLD AUTO: 0.4 K/UL (ref 1–4.8)
LYMPHOCYTES # BLD AUTO: 0.5 K/UL (ref 1–4.8)
LYMPHOCYTES # BLD AUTO: 0.5 K/UL (ref 1–4.8)
LYMPHOCYTES # BLD AUTO: 0.7 K/UL (ref 1–4.8)
LYMPHOCYTES # BLD AUTO: 1.3 K/UL (ref 1–4.8)
LYMPHOCYTES # BLD AUTO: 1.4 K/UL (ref 1–4.8)
LYMPHOCYTES # BLD AUTO: 1.4 K/UL (ref 1–4.8)
LYMPHOCYTES # BLD AUTO: 1.6 K/UL (ref 1–4.8)
LYMPHOCYTES # BLD AUTO: 1.7 K/UL (ref 1–4.8)
LYMPHOCYTES # BLD AUTO: 1.7 K/UL (ref 1–4.8)
LYMPHOCYTES # BLD AUTO: 1.8 K/UL (ref 1–4.8)
LYMPHOCYTES NFR BLD: 13.8 % (ref 18–48)
LYMPHOCYTES NFR BLD: 15.3 % (ref 18–48)
LYMPHOCYTES NFR BLD: 15.4 % (ref 18–48)
LYMPHOCYTES NFR BLD: 16.2 % (ref 18–48)
LYMPHOCYTES NFR BLD: 17.3 % (ref 18–48)
LYMPHOCYTES NFR BLD: 17.3 % (ref 18–48)
LYMPHOCYTES NFR BLD: 18 % (ref 18–48)
LYMPHOCYTES NFR BLD: 18.3 % (ref 18–48)
LYMPHOCYTES NFR BLD: 4.5 % (ref 18–48)
LYMPHOCYTES NFR BLD: 6 % (ref 18–48)
LYMPHOCYTES NFR BLD: 7 % (ref 18–48)
LYMPHOCYTES NFR BLD: 7.5 % (ref 18–48)
LYMPHOCYTES NFR BLD: 8.5 % (ref 18–48)
MAGNESIUM SERPL-MCNC: 2.2 MG/DL (ref 1.6–2.6)
MAGNESIUM SERPL-MCNC: 2.4 MG/DL (ref 1.6–2.6)
MAGNESIUM SERPL-MCNC: 2.5 MG/DL (ref 1.6–2.6)
MAGNESIUM SERPL-MCNC: 2.6 MG/DL (ref 1.6–2.6)
MAGNESIUM SERPL-MCNC: 2.7 MG/DL (ref 1.6–2.6)
MAGNESIUM SERPL-MCNC: 2.8 MG/DL (ref 1.6–2.6)
MAGNESIUM SERPL-MCNC: 3 MG/DL (ref 1.6–2.6)
MAGNESIUM SERPL-MCNC: 3.2 MG/DL (ref 1.6–2.6)
MCH RBC QN AUTO: 29.4 PG (ref 27–31)
MCH RBC QN AUTO: 29.4 PG (ref 27–31)
MCH RBC QN AUTO: 29.5 PG (ref 27–31)
MCH RBC QN AUTO: 29.6 PG (ref 27–31)
MCH RBC QN AUTO: 29.6 PG (ref 27–31)
MCH RBC QN AUTO: 29.7 PG (ref 27–31)
MCH RBC QN AUTO: 29.7 PG (ref 27–31)
MCH RBC QN AUTO: 29.8 PG (ref 27–31)
MCH RBC QN AUTO: 29.8 PG (ref 27–31)
MCH RBC QN AUTO: 30 PG (ref 27–31)
MCH RBC QN AUTO: 30.1 PG (ref 27–31)
MCH RBC QN AUTO: 30.1 PG (ref 27–31)
MCH RBC QN AUTO: 30.2 PG (ref 27–31)
MCHC RBC AUTO-ENTMCNC: 29.7 G/DL (ref 32–36)
MCHC RBC AUTO-ENTMCNC: 29.9 G/DL (ref 32–36)
MCHC RBC AUTO-ENTMCNC: 30.1 G/DL (ref 32–36)
MCHC RBC AUTO-ENTMCNC: 30.2 G/DL (ref 32–36)
MCHC RBC AUTO-ENTMCNC: 30.9 G/DL (ref 32–36)
MCHC RBC AUTO-ENTMCNC: 31.1 G/DL (ref 32–36)
MCHC RBC AUTO-ENTMCNC: 31.1 G/DL (ref 32–36)
MCHC RBC AUTO-ENTMCNC: 31.2 G/DL (ref 32–36)
MCHC RBC AUTO-ENTMCNC: 31.3 G/DL (ref 32–36)
MCHC RBC AUTO-ENTMCNC: 31.3 G/DL (ref 32–36)
MCHC RBC AUTO-ENTMCNC: 31.6 G/DL (ref 32–36)
MCHC RBC AUTO-ENTMCNC: 31.7 G/DL (ref 32–36)
MCHC RBC AUTO-ENTMCNC: 31.8 G/DL (ref 32–36)
MCV RBC AUTO: 100 FL (ref 82–98)
MCV RBC AUTO: 100 FL (ref 82–98)
MCV RBC AUTO: 93 FL (ref 82–98)
MCV RBC AUTO: 93 FL (ref 82–98)
MCV RBC AUTO: 94 FL (ref 82–98)
MCV RBC AUTO: 94 FL (ref 82–98)
MCV RBC AUTO: 95 FL (ref 82–98)
MCV RBC AUTO: 96 FL (ref 82–98)
MCV RBC AUTO: 98 FL (ref 82–98)
MCV RBC AUTO: 98 FL (ref 82–98)
MCV RBC AUTO: 99 FL (ref 82–98)
METAMYELOCYTES NFR BLD MANUAL: 1 %
MIN VOL: 10.8
MIN VOL: 8.7
MODE: ABNORMAL
MONOCYTES # BLD AUTO: 0.1 K/UL (ref 0.3–1)
MONOCYTES # BLD AUTO: 0.2 K/UL (ref 0.3–1)
MONOCYTES # BLD AUTO: 0.2 K/UL (ref 0.3–1)
MONOCYTES # BLD AUTO: 0.8 K/UL (ref 0.3–1)
MONOCYTES # BLD AUTO: 1.1 K/UL (ref 0.3–1)
MONOCYTES # BLD AUTO: 1.6 K/UL (ref 0.3–1)
MONOCYTES # BLD AUTO: 1.8 K/UL (ref 0.3–1)
MONOCYTES # BLD AUTO: 1.9 K/UL (ref 0.3–1)
MONOCYTES # BLD AUTO: 2 K/UL (ref 0.3–1)
MONOCYTES # BLD AUTO: 2.1 K/UL (ref 0.3–1)
MONOCYTES NFR BLD: 1.7 % (ref 4–15)
MONOCYTES NFR BLD: 10.3 % (ref 4–15)
MONOCYTES NFR BLD: 14.1 % (ref 4–15)
MONOCYTES NFR BLD: 15.2 % (ref 4–15)
MONOCYTES NFR BLD: 17.9 % (ref 4–15)
MONOCYTES NFR BLD: 18.9 % (ref 4–15)
MONOCYTES NFR BLD: 19.6 % (ref 4–15)
MONOCYTES NFR BLD: 2 % (ref 4–15)
MONOCYTES NFR BLD: 2.2 % (ref 4–15)
MONOCYTES NFR BLD: 20.4 % (ref 4–15)
MONOCYTES NFR BLD: 21.8 % (ref 4–15)
MONOCYTES NFR BLD: 22 % (ref 4–15)
MONOCYTES NFR BLD: 7 % (ref 4–15)
MYCOPLASMA PNEUMONIAE: NOT DETECTED
NEUTROPHILS # BLD AUTO: 4.4 K/UL (ref 1.8–7.7)
NEUTROPHILS # BLD AUTO: 5.3 K/UL (ref 1.8–7.7)
NEUTROPHILS # BLD AUTO: 5.4 K/UL (ref 1.8–7.7)
NEUTROPHILS # BLD AUTO: 5.7 K/UL (ref 1.8–7.7)
NEUTROPHILS # BLD AUTO: 5.7 K/UL (ref 1.8–7.7)
NEUTROPHILS # BLD AUTO: 5.9 K/UL (ref 1.8–7.7)
NEUTROPHILS # BLD AUTO: 6.1 K/UL (ref 1.8–7.7)
NEUTROPHILS # BLD AUTO: 6.3 K/UL (ref 1.8–7.7)
NEUTROPHILS # BLD AUTO: 6.3 K/UL (ref 1.8–7.7)
NEUTROPHILS # BLD AUTO: 6.6 K/UL (ref 1.8–7.7)
NEUTROPHILS # BLD AUTO: 6.7 K/UL (ref 1.8–7.7)
NEUTROPHILS # BLD AUTO: 7.5 K/UL (ref 1.8–7.7)
NEUTROPHILS NFR BLD: 53.1 % (ref 38–73)
NEUTROPHILS NFR BLD: 55.4 % (ref 38–73)
NEUTROPHILS NFR BLD: 56.1 % (ref 38–73)
NEUTROPHILS NFR BLD: 58 % (ref 38–73)
NEUTROPHILS NFR BLD: 60 % (ref 38–73)
NEUTROPHILS NFR BLD: 63.3 % (ref 38–73)
NEUTROPHILS NFR BLD: 64.7 % (ref 38–73)
NEUTROPHILS NFR BLD: 73.1 % (ref 38–73)
NEUTROPHILS NFR BLD: 74 % (ref 38–73)
NEUTROPHILS NFR BLD: 81.9 % (ref 38–73)
NEUTROPHILS NFR BLD: 89.6 % (ref 38–73)
NEUTROPHILS NFR BLD: 90.4 % (ref 38–73)
NEUTROPHILS NFR BLD: 93.1 % (ref 38–73)
NRBC BLD-RTO: 0 /100 WBC
NRBC BLD-RTO: 1 /100 WBC
NRBC BLD-RTO: 1 /100 WBC
OVALOCYTES BLD QL SMEAR: ABNORMAL
OVALOCYTES BLD QL SMEAR: ABNORMAL
PCO2 BLDA: 101.9 MMHG (ref 35–45)
PCO2 BLDA: 103.7 MMHG (ref 35–45)
PCO2 BLDA: 56 MMHG (ref 35–45)
PCO2 BLDA: 59.5 MMHG (ref 35–45)
PCO2 BLDA: 80.6 MMHG (ref 35–45)
PEF LLN: 6.91
PEF PRE REF: 30.7 %
PEF PRE REF: 33.7 %
PEF PRE REF: 34.9 %
PEF PRE REF: 36.9 %
PEF PRE REF: 37.9 %
PEF PRE REF: 41.1 %
PEF REF: 9.22
PH SMN: 7.11 [PH] (ref 7.35–7.45)
PH SMN: 7.13 [PH] (ref 7.35–7.45)
PH SMN: 7.21 [PH] (ref 7.35–7.45)
PH SMN: 7.24 [PH] (ref 7.35–7.45)
PH SMN: 7.26 [PH] (ref 7.35–7.45)
PHOSPHATE SERPL-MCNC: 3.2 MG/DL (ref 2.7–4.5)
PHOSPHATE SERPL-MCNC: 3.4 MG/DL (ref 2.7–4.5)
PHOSPHATE SERPL-MCNC: 3.5 MG/DL (ref 2.7–4.5)
PHOSPHATE SERPL-MCNC: 3.5 MG/DL (ref 2.7–4.5)
PHOSPHATE SERPL-MCNC: 3.9 MG/DL (ref 2.7–4.5)
PHOSPHATE SERPL-MCNC: 4.1 MG/DL (ref 2.7–4.5)
PHOSPHATE SERPL-MCNC: 4.5 MG/DL (ref 2.7–4.5)
PHOSPHATE SERPL-MCNC: 4.6 MG/DL (ref 2.7–4.5)
PHOSPHATE SERPL-MCNC: 4.7 MG/DL (ref 2.7–4.5)
PHOSPHATE SERPL-MCNC: 5.2 MG/DL (ref 2.7–4.5)
PHOSPHATE SERPL-MCNC: 5.2 MG/DL (ref 2.7–4.5)
PHOSPHATE SERPL-MCNC: 5.5 MG/DL (ref 2.7–4.5)
PHOSPHATE SERPL-MCNC: 5.9 MG/DL (ref 2.7–4.5)
PHYSICIAN COMMENT: ABNORMAL
PLATELET # BLD AUTO: 230 K/UL (ref 150–450)
PLATELET # BLD AUTO: 233 K/UL (ref 150–450)
PLATELET # BLD AUTO: 269 K/UL (ref 150–450)
PLATELET # BLD AUTO: 274 K/UL (ref 150–450)
PLATELET # BLD AUTO: 276 K/UL (ref 150–450)
PLATELET # BLD AUTO: 294 K/UL (ref 150–450)
PLATELET # BLD AUTO: 295 K/UL (ref 150–450)
PLATELET # BLD AUTO: 310 K/UL (ref 150–450)
PLATELET # BLD AUTO: 325 K/UL (ref 150–450)
PLATELET # BLD AUTO: 350 K/UL (ref 150–450)
PLATELET # BLD AUTO: 351 K/UL (ref 150–450)
PLATELET # BLD AUTO: 386 K/UL (ref 150–450)
PLATELET # BLD AUTO: 399 K/UL (ref 150–450)
PLATELET BLD QL SMEAR: ABNORMAL
PMV BLD AUTO: 8.6 FL (ref 9.2–12.9)
PMV BLD AUTO: 8.8 FL (ref 9.2–12.9)
PMV BLD AUTO: 9 FL (ref 9.2–12.9)
PMV BLD AUTO: 9 FL (ref 9.2–12.9)
PMV BLD AUTO: 9.2 FL (ref 9.2–12.9)
PMV BLD AUTO: 9.2 FL (ref 9.2–12.9)
PMV BLD AUTO: 9.3 FL (ref 9.2–12.9)
PMV BLD AUTO: 9.3 FL (ref 9.2–12.9)
PMV BLD AUTO: 9.4 FL (ref 9.2–12.9)
PMV BLD AUTO: 9.5 FL (ref 9.2–12.9)
PMV BLD AUTO: 9.6 FL (ref 9.2–12.9)
PMV BLD AUTO: 9.8 FL (ref 9.2–12.9)
PMV BLD AUTO: 9.8 FL (ref 9.2–12.9)
PO2 BLDA: 104 MMHG (ref 80–100)
PO2 BLDA: 50 MMHG (ref 80–100)
PO2 BLDA: 56 MMHG (ref 80–100)
PO2 BLDA: 61 MMHG (ref 80–100)
PO2 BLDA: 68 MMHG (ref 80–100)
POC BE: -2 MMOL/L
POC BE: -2 MMOL/L
POC BE: 3 MMOL/L
POC BE: 4 MMOL/L
POC BE: 6 MMOL/L
POC SATURATED O2: 78 % (ref 95–100)
POC SATURATED O2: 79 % (ref 95–100)
POC SATURATED O2: 80 % (ref 95–100)
POC SATURATED O2: 84 % (ref 95–100)
POC SATURATED O2: 97 % (ref 95–100)
POC TCO2: 27 MMOL/L (ref 23–27)
POC TCO2: 27 MMOL/L (ref 23–27)
POC TCO2: 34 MMOL/L (ref 23–27)
POC TCO2: 36 MMOL/L (ref 23–27)
POC TCO2: 38 MMOL/L (ref 23–27)
POCT GLUCOSE: 104 MG/DL (ref 70–110)
POCT GLUCOSE: 108 MG/DL (ref 70–110)
POCT GLUCOSE: 111 MG/DL (ref 70–110)
POCT GLUCOSE: 114 MG/DL (ref 70–110)
POCT GLUCOSE: 121 MG/DL (ref 70–110)
POCT GLUCOSE: 127 MG/DL (ref 70–110)
POCT GLUCOSE: 131 MG/DL (ref 70–110)
POCT GLUCOSE: 132 MG/DL (ref 70–110)
POCT GLUCOSE: 134 MG/DL (ref 70–110)
POCT GLUCOSE: 135 MG/DL (ref 70–110)
POCT GLUCOSE: 137 MG/DL (ref 70–110)
POCT GLUCOSE: 139 MG/DL (ref 70–110)
POCT GLUCOSE: 141 MG/DL (ref 70–110)
POCT GLUCOSE: 145 MG/DL (ref 70–110)
POCT GLUCOSE: 147 MG/DL (ref 70–110)
POCT GLUCOSE: 147 MG/DL (ref 70–110)
POCT GLUCOSE: 148 MG/DL (ref 70–110)
POCT GLUCOSE: 150 MG/DL (ref 70–110)
POCT GLUCOSE: 152 MG/DL (ref 70–110)
POCT GLUCOSE: 152 MG/DL (ref 70–110)
POCT GLUCOSE: 153 MG/DL (ref 70–110)
POCT GLUCOSE: 155 MG/DL (ref 70–110)
POCT GLUCOSE: 161 MG/DL (ref 70–110)
POCT GLUCOSE: 165 MG/DL (ref 70–110)
POCT GLUCOSE: 175 MG/DL (ref 70–110)
POCT GLUCOSE: 179 MG/DL (ref 70–110)
POCT GLUCOSE: 183 MG/DL (ref 70–110)
POCT GLUCOSE: 184 MG/DL (ref 70–110)
POCT GLUCOSE: 187 MG/DL (ref 70–110)
POCT GLUCOSE: 191 MG/DL (ref 70–110)
POCT GLUCOSE: 192 MG/DL (ref 70–110)
POCT GLUCOSE: 193 MG/DL (ref 70–110)
POCT GLUCOSE: 198 MG/DL (ref 70–110)
POCT GLUCOSE: 202 MG/DL (ref 70–110)
POCT GLUCOSE: 204 MG/DL (ref 70–110)
POCT GLUCOSE: 206 MG/DL (ref 70–110)
POCT GLUCOSE: 211 MG/DL (ref 70–110)
POCT GLUCOSE: 215 MG/DL (ref 70–110)
POCT GLUCOSE: 217 MG/DL (ref 70–110)
POCT GLUCOSE: 228 MG/DL (ref 70–110)
POCT GLUCOSE: 229 MG/DL (ref 70–110)
POCT GLUCOSE: 235 MG/DL (ref 70–110)
POCT GLUCOSE: 245 MG/DL (ref 70–110)
POCT GLUCOSE: 250 MG/DL (ref 70–110)
POCT GLUCOSE: 250 MG/DL (ref 70–110)
POCT GLUCOSE: 259 MG/DL (ref 70–110)
POCT GLUCOSE: 270 MG/DL (ref 70–110)
POCT GLUCOSE: 277 MG/DL (ref 70–110)
POCT GLUCOSE: 330 MG/DL (ref 70–110)
POCT GLUCOSE: 99 MG/DL (ref 70–110)
POIKILOCYTOSIS BLD QL SMEAR: SLIGHT
POIKILOCYTOSIS BLD QL SMEAR: SLIGHT
POLYCHROMASIA BLD QL SMEAR: ABNORMAL
POTASSIUM SERPL-SCNC: 3.7 MMOL/L (ref 3.5–5.1)
POTASSIUM SERPL-SCNC: 4 MMOL/L (ref 3.5–5.1)
POTASSIUM SERPL-SCNC: 4 MMOL/L (ref 3.5–5.1)
POTASSIUM SERPL-SCNC: 4.1 MMOL/L (ref 3.5–5.1)
POTASSIUM SERPL-SCNC: 4.2 MMOL/L (ref 3.5–5.1)
POTASSIUM SERPL-SCNC: 4.2 MMOL/L (ref 3.5–5.1)
POTASSIUM SERPL-SCNC: 4.3 MMOL/L (ref 3.5–5.1)
POTASSIUM SERPL-SCNC: 4.3 MMOL/L (ref 3.5–5.1)
POTASSIUM SERPL-SCNC: 4.4 MMOL/L (ref 3.5–5.1)
POTASSIUM SERPL-SCNC: 4.5 MMOL/L (ref 3.5–5.1)
POTASSIUM SERPL-SCNC: 4.6 MMOL/L (ref 3.5–5.1)
POTASSIUM SERPL-SCNC: 4.7 MMOL/L (ref 3.5–5.1)
POTASSIUM SERPL-SCNC: 4.8 MMOL/L (ref 3.5–5.1)
POTASSIUM SERPL-SCNC: 4.9 MMOL/L (ref 3.5–5.1)
POTASSIUM SERPL-SCNC: 5.3 MMOL/L (ref 3.5–5.1)
POTASSIUM SERPL-SCNC: 5.8 MMOL/L (ref 3.5–5.1)
PRE DLCO: 5.27 ML/(MIN*MMHG) (ref 22.23–36.08)
PRE ERV: 0.41 L (ref -16448.81–16451.19)
PRE FEF 25 75: 0.52 L/S (ref 1.47–5.04)
PRE FEF 25 75: 0.57 L/S (ref 1.46–5.04)
PRE FEF 25 75: 0.58 L/S (ref 1.43–4.99)
PRE FEF 25 75: 0.58 L/S (ref 1.45–5.01)
PRE FEF 25 75: 0.6 L/S (ref 1.44–5)
PRE FEF 25 75: 0.6 L/S (ref 1.46–5.03)
PRE FET 100: 5.09 SEC
PRE FET 100: 5.17 SEC
PRE FET 100: 5.28 SEC
PRE FET 100: 5.7 SEC
PRE FET 100: 5.88 SEC
PRE FET 100: 7.07 SEC
PRE FEV05 REF: 18.4 %
PRE FEV05 REF: 21.2 %
PRE FEV05 REF: 22.6 %
PRE FEV05 REF: 23.9 %
PRE FEV05 REF: 23.9 %
PRE FEV05 REF: 25.6 %
PRE FEV1 FVC: 69.58 % (ref 65.46–87.92)
PRE FEV1 FVC: 74.09 % (ref 65.45–87.92)
PRE FEV1 FVC: 74.23 % (ref 65.41–87.92)
PRE FEV1 FVC: 75.84 % (ref 65.34–87.91)
PRE FEV1 FVC: 78.52 % (ref 65.28–87.91)
PRE FEV1 FVC: 81.32 % (ref 65.26–87.9)
PRE FEV1: 0.63 L (ref 2.68–4.39)
PRE FEV1: 0.73 L (ref 2.68–4.39)
PRE FEV1: 0.78 L (ref 2.69–4.4)
PRE FEV1: 0.82 L (ref 2.7–4.42)
PRE FEV1: 0.84 L (ref 2.7–4.41)
PRE FEV1: 0.89 L (ref 2.7–4.42)
PRE FEV5: 0.52 L (ref 1.68–3.95)
PRE FEV5: 0.6 L (ref 1.68–3.95)
PRE FEV5: 0.64 L (ref 1.68–3.95)
PRE FEV5: 0.67 L (ref 1.68–3.95)
PRE FEV5: 0.67 L (ref 1.68–3.95)
PRE FEV5: 0.72 L (ref 1.68–3.95)
PRE FRC PL: 1.91 L (ref 2.62–4.6)
PRE FVC: 0.77 L (ref 3.51–5.73)
PRE FVC: 0.93 L (ref 3.51–5.73)
PRE FVC: 1.02 L (ref 3.52–5.74)
PRE FVC: 1.11 L (ref 3.54–5.75)
PRE FVC: 1.13 L (ref 3.53–5.75)
PRE FVC: 1.27 L (ref 3.54–5.75)
PRE IC: 0.67 L (ref -9999996.68–#######.####)
PRE PEF: 2.83 L/S (ref 6.91–11.54)
PRE PEF: 3.11 L/S (ref 6.91–11.54)
PRE PEF: 3.22 L/S (ref 6.91–11.54)
PRE PEF: 3.4 L/S (ref 6.91–11.54)
PRE PEF: 3.49 L/S (ref 6.91–11.54)
PRE PEF: 3.79 L/S (ref 6.91–11.54)
PRE REF IC: 20.2 %
PRE RV: 1.5 L (ref 1.74–3.09)
PRE TLC: 2.59 L (ref 5.97–8.28)
PROCALCITONIN SERPL IA-MCNC: 0.36 NG/ML
PROT SERPL-MCNC: 6.4 G/DL (ref 6–8.4)
PROT SERPL-MCNC: 6.5 G/DL (ref 6–8.4)
PROT SERPL-MCNC: 6.5 G/DL (ref 6–8.4)
PROT SERPL-MCNC: 6.6 G/DL (ref 6–8.4)
PROT SERPL-MCNC: 6.7 G/DL (ref 6–8.4)
PROT SERPL-MCNC: 6.7 G/DL (ref 6–8.4)
PROT SERPL-MCNC: 6.8 G/DL (ref 6–8.4)
PROT SERPL-MCNC: 7 G/DL (ref 6–8.4)
PROT SERPL-MCNC: 7 G/DL (ref 6–8.4)
PROT SERPL-MCNC: 7.3 G/DL (ref 6–8.4)
PROT SERPL-MCNC: 7.4 G/DL (ref 6–8.4)
PROT SERPL-MCNC: 7.5 G/DL (ref 6–8.4)
PROT SERPL-MCNC: 7.6 G/DL (ref 6–8.4)
PROT SERPL-MCNC: 7.8 G/DL (ref 6–8.4)
PROTHROMBIN TIME: 9.8 SEC (ref 9–12.5)
PTH-INTACT SERPL-MCNC: 152.8 PG/ML (ref 9–77)
RAW PRE REF: 224.7 %
RAW PRE: 6.87 CMH2O*S/L (ref 3.06–3.06)
RAW REF: 3.06
RBC # BLD AUTO: 3.02 M/UL (ref 4.6–6.2)
RBC # BLD AUTO: 3.03 M/UL (ref 4.6–6.2)
RBC # BLD AUTO: 3.09 M/UL (ref 4.6–6.2)
RBC # BLD AUTO: 3.15 M/UL (ref 4.6–6.2)
RBC # BLD AUTO: 3.35 M/UL (ref 4.6–6.2)
RBC # BLD AUTO: 3.37 M/UL (ref 4.6–6.2)
RBC # BLD AUTO: 3.44 M/UL (ref 4.6–6.2)
RBC # BLD AUTO: 3.49 M/UL (ref 4.6–6.2)
RBC # BLD AUTO: 3.61 M/UL (ref 4.6–6.2)
RBC # BLD AUTO: 3.62 M/UL (ref 4.6–6.2)
RBC # BLD AUTO: 3.84 M/UL (ref 4.6–6.2)
RBC # BLD AUTO: 3.95 M/UL (ref 4.6–6.2)
RBC # BLD AUTO: 4.05 M/UL (ref 4.6–6.2)
REF IC: 3.32
RESPIRATORY INFECTION PANEL SOURCE: NORMAL
RSV RNA NPH QL NAA+NON-PROBE: NOT DETECTED
RV LLN: 1.74
RV PRE REF: 62 %
RV REF: 2.42
RV+EV RNA NPH QL NAA+NON-PROBE: NOT DETECTED
RVTLC LLN: 28
RVTLC PRE REF: 155.3 %
RVTLC PRE: 58.04 % (ref 28.38–46.34)
RVTLC REF: 37
RVTLCULN: 46
RVULN: 3.09
SAMPLE: ABNORMAL
SARS-COV-2 RNA RESP QL NAA+PROBE: NOT DETECTED
SGAW PRE REF: 79.4 %
SGAW PRE: 0.07 1/(CMH2O*S) (ref 0.08–0.08)
SGAW REF: 0.08
SITE: ABNORMAL
SODIUM SERPL-SCNC: 131 MMOL/L (ref 136–145)
SODIUM SERPL-SCNC: 132 MMOL/L (ref 136–145)
SODIUM SERPL-SCNC: 134 MMOL/L (ref 136–145)
SODIUM SERPL-SCNC: 135 MMOL/L (ref 136–145)
SODIUM SERPL-SCNC: 136 MMOL/L (ref 136–145)
SODIUM SERPL-SCNC: 137 MMOL/L (ref 136–145)
SODIUM SERPL-SCNC: 138 MMOL/L (ref 136–145)
SODIUM SERPL-SCNC: 138 MMOL/L (ref 136–145)
SODIUM SERPL-SCNC: 139 MMOL/L (ref 136–145)
SODIUM SERPL-SCNC: 139 MMOL/L (ref 136–145)
SP02: 90
SP02: 93
SP02: 95
SP02: 98
SP02: 99
SPHEROCYTES BLD QL SMEAR: ABNORMAL
SPONT RATE: 28
SPONT RATE: 32
TACROLIMUS BLD-MCNC: 3.2 NG/ML (ref 5–15)
TACROLIMUS BLD-MCNC: 3.2 NG/ML (ref 5–15)
TACROLIMUS BLD-MCNC: 3.3 NG/ML (ref 5–15)
TACROLIMUS BLD-MCNC: 3.4 NG/ML (ref 5–15)
TACROLIMUS BLD-MCNC: 3.4 NG/ML (ref 5–15)
TACROLIMUS BLD-MCNC: 3.7 NG/ML (ref 5–15)
TACROLIMUS BLD-MCNC: 3.7 NG/ML (ref 5–15)
TACROLIMUS BLD-MCNC: 3.9 NG/ML (ref 5–15)
TACROLIMUS BLD-MCNC: 4.4 NG/ML (ref 5–15)
TACROLIMUS BLD-MCNC: 4.8 NG/ML (ref 5–15)
TACROLIMUS BLD-MCNC: 5.8 NG/ML (ref 5–15)
TACROLIMUS BLD-MCNC: 6.1 NG/ML (ref 5–15)
TACROLIMUS BLD-MCNC: 6.8 NG/ML (ref 5–15)
TACROLIMUS BLD-MCNC: 6.9 NG/ML (ref 5–15)
TACROLIMUS BLD-MCNC: 8.3 NG/ML (ref 5–15)
TLC LLN: 5.97
TLC PRE REF: 36.3 %
TLC REF: 7.13
TLC ULN: 8.28
TSH SERPL DL<=0.005 MIU/L-ACNC: 0.83 UIU/ML (ref 0.4–4)
ULN IC: ABNORMAL
VA PRE: 1.42 L (ref 6.98–6.98)
VA SINGLE BREATH LLN: 6.98
VA SINGLE BREATH PRE REF: 20.3 %
VA SINGLE BREATH REF: 6.98
VANCOMYCIN SERPL-MCNC: 15.5 UG/ML
VANCOMYCIN SERPL-MCNC: 15.8 UG/ML
VANCOMYCIN SERPL-MCNC: 21.5 UG/ML
VANCOMYCIN SERPL-MCNC: 23.5 UG/ML
VASINGLEBREATHULN: 6.98
VC LLN: 3.52
VC PRE REF: 23.5 %
VC PRE: 1.08 L (ref 3.52–5.74)
VC REF: 4.62
VC ULN: 5.74
WBC # BLD AUTO: 10.15 K/UL (ref 3.9–12.7)
WBC # BLD AUTO: 10.36 K/UL (ref 3.9–12.7)
WBC # BLD AUTO: 10.43 K/UL (ref 3.9–12.7)
WBC # BLD AUTO: 11.24 K/UL (ref 3.9–12.7)
WBC # BLD AUTO: 6.97 K/UL (ref 3.9–12.7)
WBC # BLD AUTO: 7.37 K/UL (ref 3.9–12.7)
WBC # BLD AUTO: 7.45 K/UL (ref 3.9–12.7)
WBC # BLD AUTO: 7.78 K/UL (ref 3.9–12.7)
WBC # BLD AUTO: 8.03 K/UL (ref 3.9–12.7)
WBC # BLD AUTO: 8.27 K/UL (ref 3.9–12.7)
WBC # BLD AUTO: 9.12 K/UL (ref 3.9–12.7)
WBC # BLD AUTO: 9.27 K/UL (ref 3.9–12.7)
WBC # BLD AUTO: 9.51 K/UL (ref 3.9–12.7)

## 2023-01-01 PROCEDURE — 80053 COMPREHEN METABOLIC PANEL: CPT | Mod: HCNC | Performed by: INTERNAL MEDICINE

## 2023-01-01 PROCEDURE — 80197 ASSAY OF TACROLIMUS: CPT | Mod: HCNC | Performed by: INTERNAL MEDICINE

## 2023-01-01 PROCEDURE — 84100 ASSAY OF PHOSPHORUS: CPT | Mod: HCNC | Performed by: NURSE PRACTITIONER

## 2023-01-01 PROCEDURE — 63600175 PHARM REV CODE 636 W HCPCS: Mod: JZ,HCNC | Performed by: NURSE PRACTITIONER

## 2023-01-01 PROCEDURE — 63600175 PHARM REV CODE 636 W HCPCS: Mod: HCNC | Performed by: NURSE PRACTITIONER

## 2023-01-01 PROCEDURE — 27100171 HC OXYGEN HIGH FLOW UP TO 24 HOURS: Mod: HCNC

## 2023-01-01 PROCEDURE — C1894 INTRO/SHEATH, NON-LASER: HCPCS | Mod: HCNC | Performed by: SURGERY

## 2023-01-01 PROCEDURE — 80169 DRUG ASSAY EVEROLIMUS: CPT | Mod: HCNC | Performed by: INTERNAL MEDICINE

## 2023-01-01 PROCEDURE — 63600175 PHARM REV CODE 636 W HCPCS: Mod: HCNC | Performed by: INTERNAL MEDICINE

## 2023-01-01 PROCEDURE — 20600001 HC STEP DOWN PRIVATE ROOM: Mod: HCNC

## 2023-01-01 PROCEDURE — 3077F SYST BP >= 140 MM HG: CPT | Mod: HCNC,CPTII,S$GLB, | Performed by: INTERNAL MEDICINE

## 2023-01-01 PROCEDURE — 99233 PR SUBSEQUENT HOSPITAL CARE,LEVL III: ICD-10-PCS | Mod: HCNC,,, | Performed by: INTERNAL MEDICINE

## 2023-01-01 PROCEDURE — C1769 GUIDE WIRE: HCPCS | Mod: HCNC | Performed by: SURGERY

## 2023-01-01 PROCEDURE — 80076 HEPATIC FUNCTION PANEL: CPT | Mod: HCNC | Performed by: PHYSICIAN ASSISTANT

## 2023-01-01 PROCEDURE — 99232 PR SUBSEQUENT HOSPITAL CARE,LEVL II: ICD-10-PCS | Mod: HCNC,,, | Performed by: PHYSICIAN ASSISTANT

## 2023-01-01 PROCEDURE — 99900035 HC TECH TIME PER 15 MIN (STAT): Mod: HCNC

## 2023-01-01 PROCEDURE — 1159F PR MEDICATION LIST DOCUMENTED IN MEDICAL RECORD: ICD-10-PCS | Mod: HCNC,CPTII,S$GLB, | Performed by: OPTOMETRIST

## 2023-01-01 PROCEDURE — 94618 PULMONARY STRESS TESTING: CPT | Mod: HCNC,S$GLB,, | Performed by: INTERNAL MEDICINE

## 2023-01-01 PROCEDURE — 3074F PR MOST RECENT SYSTOLIC BLOOD PRESSURE < 130 MM HG: ICD-10-PCS | Mod: HCNC,CPTII,S$GLB, | Performed by: FAMILY MEDICINE

## 2023-01-01 PROCEDURE — 83735 ASSAY OF MAGNESIUM: CPT | Mod: HCNC | Performed by: INTERNAL MEDICINE

## 2023-01-01 PROCEDURE — 25000003 PHARM REV CODE 250: Mod: HCNC | Performed by: STUDENT IN AN ORGANIZED HEALTH CARE EDUCATION/TRAINING PROGRAM

## 2023-01-01 PROCEDURE — 3072F LOW RISK FOR RETINOPATHY: CPT | Mod: HCNC,CPTII,S$GLB, | Performed by: FAMILY MEDICINE

## 2023-01-01 PROCEDURE — 3078F DIAST BP <80 MM HG: CPT | Mod: HCNC,CPTII,S$GLB, | Performed by: PHYSICIAN ASSISTANT

## 2023-01-01 PROCEDURE — 83735 ASSAY OF MAGNESIUM: CPT | Mod: HCNC | Performed by: PHYSICIAN ASSISTANT

## 2023-01-01 PROCEDURE — 1111F PR DISCHARGE MEDS RECONCILED W/ CURRENT OUTPATIENT MED LIST: ICD-10-PCS | Mod: HCNC,CPTII,S$GLB, | Performed by: INTERNAL MEDICINE

## 2023-01-01 PROCEDURE — 25000003 PHARM REV CODE 250: Mod: HCNC | Performed by: SURGERY

## 2023-01-01 PROCEDURE — 3079F PR MOST RECENT DIASTOLIC BLOOD PRESSURE 80-89 MM HG: ICD-10-PCS | Mod: HCNC,CPTII,S$GLB, | Performed by: NURSE PRACTITIONER

## 2023-01-01 PROCEDURE — 3066F NEPHROPATHY DOC TX: CPT | Mod: HCNC,CPTII,S$GLB, | Performed by: INTERNAL MEDICINE

## 2023-01-01 PROCEDURE — 71046 XR CHEST PA AND LATERAL: ICD-10-PCS | Mod: 26,HCNC,, | Performed by: RADIOLOGY

## 2023-01-01 PROCEDURE — G0180 MD CERTIFICATION HHA PATIENT: HCPCS | Mod: ,,, | Performed by: INTERNAL MEDICINE

## 2023-01-01 PROCEDURE — 94761 N-INVAS EAR/PLS OXIMETRY MLT: CPT | Mod: HCNC

## 2023-01-01 PROCEDURE — 99232 SBSQ HOSP IP/OBS MODERATE 35: CPT | Mod: HCNC,,, | Performed by: NURSE PRACTITIONER

## 2023-01-01 PROCEDURE — 94660 CPAP INITIATION&MGMT: CPT | Mod: HCNC

## 2023-01-01 PROCEDURE — 99222 1ST HOSP IP/OBS MODERATE 55: CPT | Mod: HCNC,,, | Performed by: PHYSICIAN ASSISTANT

## 2023-01-01 PROCEDURE — 36600 WITHDRAWAL OF ARTERIAL BLOOD: CPT | Mod: HCNC

## 2023-01-01 PROCEDURE — 3078F DIAST BP <80 MM HG: CPT | Mod: HCNC,CPTII,S$GLB, | Performed by: FAMILY MEDICINE

## 2023-01-01 PROCEDURE — 80100016 HC MAINTENANCE HEMODIALYSIS: Mod: HCNC

## 2023-01-01 PROCEDURE — 82962 GLUCOSE BLOOD TEST: CPT | Mod: HCNC | Performed by: SURGERY

## 2023-01-01 PROCEDURE — 3079F DIAST BP 80-89 MM HG: CPT | Mod: HCNC,CPTII,S$GLB, | Performed by: NURSE PRACTITIONER

## 2023-01-01 PROCEDURE — 3074F SYST BP LT 130 MM HG: CPT | Mod: HCNC,CPTII,S$GLB, | Performed by: NURSE PRACTITIONER

## 2023-01-01 PROCEDURE — 36000706: Mod: HCNC | Performed by: SURGERY

## 2023-01-01 PROCEDURE — 3008F PR BODY MASS INDEX (BMI) DOCUMENTED: ICD-10-PCS | Mod: HCNC,CPTII,S$GLB, | Performed by: SURGERY

## 2023-01-01 PROCEDURE — 1159F MED LIST DOCD IN RCRD: CPT | Mod: HCNC,CPTII,S$GLB, | Performed by: INTERNAL MEDICINE

## 2023-01-01 PROCEDURE — 80202 ASSAY OF VANCOMYCIN: CPT | Mod: HCNC | Performed by: INTERNAL MEDICINE

## 2023-01-01 PROCEDURE — 97116 GAIT TRAINING THERAPY: CPT | Mod: HCNC

## 2023-01-01 PROCEDURE — 3072F PR LOW RISK FOR RETINOPATHY: ICD-10-PCS | Mod: CPTII,S$GLB,, | Performed by: INTERNAL MEDICINE

## 2023-01-01 PROCEDURE — 1159F PR MEDICATION LIST DOCUMENTED IN MEDICAL RECORD: ICD-10-PCS | Mod: HCNC,CPTII,S$GLB, | Performed by: SURGERY

## 2023-01-01 PROCEDURE — G0439 PPPS, SUBSEQ VISIT: HCPCS | Mod: HCNC,S$GLB,, | Performed by: NURSE PRACTITIONER

## 2023-01-01 PROCEDURE — 1111F DSCHRG MED/CURRENT MED MERGE: CPT | Mod: HCNC,CPTII,S$GLB, | Performed by: FAMILY MEDICINE

## 2023-01-01 PROCEDURE — 94799 UNLISTED PULMONARY SVC/PX: CPT | Mod: HCNC

## 2023-01-01 PROCEDURE — 99999 PR PBB SHADOW E&M-EST. PATIENT-LVL V: ICD-10-PCS | Mod: PBBFAC,HCNC,, | Performed by: NURSE PRACTITIONER

## 2023-01-01 PROCEDURE — 99999 PR PBB SHADOW E&M-EST. PATIENT-LVL V: ICD-10-PCS | Mod: PBBFAC,HCNC,, | Performed by: INTERNAL MEDICINE

## 2023-01-01 PROCEDURE — 84100 ASSAY OF PHOSPHORUS: CPT | Mod: HCNC | Performed by: PHYSICIAN ASSISTANT

## 2023-01-01 PROCEDURE — 85610 PROTHROMBIN TIME: CPT | Mod: HCNC | Performed by: PHYSICIAN ASSISTANT

## 2023-01-01 PROCEDURE — 1159F PR MEDICATION LIST DOCUMENTED IN MEDICAL RECORD: ICD-10-PCS | Mod: HCNC,CPTII,S$GLB, | Performed by: NURSE PRACTITIONER

## 2023-01-01 PROCEDURE — G0180 PR HOME HEALTH MD CERTIFICATION: ICD-10-PCS | Mod: ,,, | Performed by: INTERNAL MEDICINE

## 2023-01-01 PROCEDURE — 63600175 PHARM REV CODE 636 W HCPCS: Mod: HCNC | Performed by: STUDENT IN AN ORGANIZED HEALTH CARE EDUCATION/TRAINING PROGRAM

## 2023-01-01 PROCEDURE — 99999 PR PBB SHADOW E&M-EST. PATIENT-LVL III: ICD-10-PCS | Mod: PBBFAC,HCNC,, | Performed by: SURGERY

## 2023-01-01 PROCEDURE — 37000008 HC ANESTHESIA 1ST 15 MINUTES: Mod: HCNC | Performed by: SURGERY

## 2023-01-01 PROCEDURE — 3066F PR DOCUMENTATION OF TREATMENT FOR NEPHROPATHY: ICD-10-PCS | Mod: HCNC,CPTII,S$GLB, | Performed by: FAMILY MEDICINE

## 2023-01-01 PROCEDURE — 25000003 PHARM REV CODE 250: Mod: HCNC | Performed by: INTERNAL MEDICINE

## 2023-01-01 PROCEDURE — 71250 CT THORAX DX C-: CPT | Mod: 26,,, | Performed by: STUDENT IN AN ORGANIZED HEALTH CARE EDUCATION/TRAINING PROGRAM

## 2023-01-01 PROCEDURE — 85025 COMPLETE CBC W/AUTO DIFF WBC: CPT | Mod: HCNC | Performed by: INTERNAL MEDICINE

## 2023-01-01 PROCEDURE — 27000221 HC OXYGEN, UP TO 24 HOURS: Mod: HCNC

## 2023-01-01 PROCEDURE — 1159F PR MEDICATION LIST DOCUMENTED IN MEDICAL RECORD: ICD-10-PCS | Mod: HCNC,CPTII,S$GLB, | Performed by: INTERNAL MEDICINE

## 2023-01-01 PROCEDURE — 99223 PR INITIAL HOSPITAL CARE,LEVL III: ICD-10-PCS | Mod: HCNC,25,, | Performed by: NURSE PRACTITIONER

## 2023-01-01 PROCEDURE — 3077F PR MOST RECENT SYSTOLIC BLOOD PRESSURE >= 140 MM HG: ICD-10-PCS | Mod: HCNC,CPTII,S$GLB, | Performed by: NURSE PRACTITIONER

## 2023-01-01 PROCEDURE — 97535 SELF CARE MNGMENT TRAINING: CPT | Mod: HCNC

## 2023-01-01 PROCEDURE — 99999 PR PBB SHADOW E&M-EST. PATIENT-LVL III: ICD-10-PCS | Mod: PBBFAC,HCNC,, | Performed by: OPTOMETRIST

## 2023-01-01 PROCEDURE — 90935 HEMODIALYSIS ONE EVALUATION: CPT | Mod: ,,, | Performed by: INTERNAL MEDICINE

## 2023-01-01 PROCEDURE — 99999 PR PBB SHADOW E&M-EST. PATIENT-LVL III: CPT | Mod: PBBFAC,HCNC,, | Performed by: SURGERY

## 2023-01-01 PROCEDURE — 90935 PR HEMODIALYSIS, ONE EVALUATION: ICD-10-PCS | Mod: HCNC,,, | Performed by: NURSE PRACTITIONER

## 2023-01-01 PROCEDURE — 93990 DOPPLER FLOW TESTING: CPT | Mod: HCNC,S$GLB,, | Performed by: SURGERY

## 2023-01-01 PROCEDURE — 1159F PR MEDICATION LIST DOCUMENTED IN MEDICAL RECORD: ICD-10-PCS | Mod: HCNC,CPTII,S$GLB, | Performed by: PHYSICIAN ASSISTANT

## 2023-01-01 PROCEDURE — 94010 BREATHING CAPACITY TEST: ICD-10-PCS | Mod: 26,HCNC,, | Performed by: INTERNAL MEDICINE

## 2023-01-01 PROCEDURE — 99291 CRITICAL CARE FIRST HOUR: CPT | Mod: HCNC,,, | Performed by: INTERNAL MEDICINE

## 2023-01-01 PROCEDURE — 99999 PR PBB SHADOW E&M-EST. PATIENT-LVL V: ICD-10-PCS | Mod: PBBFAC,HCNC,, | Performed by: FAMILY MEDICINE

## 2023-01-01 PROCEDURE — 3008F BODY MASS INDEX DOCD: CPT | Mod: HCNC,CPTII,S$GLB, | Performed by: INTERNAL MEDICINE

## 2023-01-01 PROCEDURE — 1159F PR MEDICATION LIST DOCUMENTED IN MEDICAL RECORD: ICD-10-PCS | Mod: HCNC,CPTII,S$GLB, | Performed by: FAMILY MEDICINE

## 2023-01-01 PROCEDURE — 1160F PR REVIEW ALL MEDS BY PRESCRIBER/CLIN PHARMACIST DOCUMENTED: ICD-10-PCS | Mod: HCNC,CPTII,S$GLB, | Performed by: NURSE PRACTITIONER

## 2023-01-01 PROCEDURE — 80048 BASIC METABOLIC PNL TOTAL CA: CPT | Mod: HCNC | Performed by: PHYSICIAN ASSISTANT

## 2023-01-01 PROCEDURE — 87305 ASPERGILLUS AG IA: CPT | Mod: HCNC | Performed by: INTERNAL MEDICINE

## 2023-01-01 PROCEDURE — 3008F BODY MASS INDEX DOCD: CPT | Mod: HCNC,CPTII,S$GLB, | Performed by: PHYSICIAN ASSISTANT

## 2023-01-01 PROCEDURE — 97166 OT EVAL MOD COMPLEX 45 MIN: CPT | Mod: HCNC

## 2023-01-01 PROCEDURE — 99215 OFFICE O/P EST HI 40 MIN: CPT | Mod: 25,HCNC,S$GLB, | Performed by: PHYSICIAN ASSISTANT

## 2023-01-01 PROCEDURE — 99215 PR OFFICE/OUTPT VISIT, EST, LEVL V, 40-54 MIN: ICD-10-PCS | Mod: 25,HCNC,S$GLB, | Performed by: INTERNAL MEDICINE

## 2023-01-01 PROCEDURE — 83735 ASSAY OF MAGNESIUM: CPT | Mod: HCNC | Performed by: NURSE PRACTITIONER

## 2023-01-01 PROCEDURE — 71000015 HC POSTOP RECOV 1ST HR: Mod: HCNC | Performed by: SURGERY

## 2023-01-01 PROCEDURE — 3078F PR MOST RECENT DIASTOLIC BLOOD PRESSURE < 80 MM HG: ICD-10-PCS | Mod: HCNC,CPTII,S$GLB, | Performed by: PHYSICIAN ASSISTANT

## 2023-01-01 PROCEDURE — C1887 CATHETER, GUIDING: HCPCS | Mod: HCNC | Performed by: SURGERY

## 2023-01-01 PROCEDURE — 25000003 PHARM REV CODE 250: Mod: HCNC

## 2023-01-01 PROCEDURE — 80076 HEPATIC FUNCTION PANEL: CPT | Mod: HCNC | Performed by: NURSE PRACTITIONER

## 2023-01-01 PROCEDURE — 4010F ACE/ARB THERAPY RXD/TAKEN: CPT | Mod: HCNC,CPTII,S$GLB, | Performed by: INTERNAL MEDICINE

## 2023-01-01 PROCEDURE — 3080F PR MOST RECENT DIASTOLIC BLOOD PRESSURE >= 90 MM HG: ICD-10-PCS | Mod: CPTII,S$GLB,, | Performed by: INTERNAL MEDICINE

## 2023-01-01 PROCEDURE — 82803 BLOOD GASES ANY COMBINATION: CPT | Mod: HCNC

## 2023-01-01 PROCEDURE — 3078F PR MOST RECENT DIASTOLIC BLOOD PRESSURE < 80 MM HG: ICD-10-PCS | Mod: HCNC,CPTII,S$GLB, | Performed by: FAMILY MEDICINE

## 2023-01-01 PROCEDURE — 90935 PR HEMODIALYSIS, ONE EVALUATION: ICD-10-PCS | Mod: HCNC,,, | Performed by: INTERNAL MEDICINE

## 2023-01-01 PROCEDURE — 1159F MED LIST DOCD IN RCRD: CPT | Mod: HCNC,CPTII,S$GLB, | Performed by: SURGERY

## 2023-01-01 PROCEDURE — 92015 DETERMINE REFRACTIVE STATE: CPT | Mod: HCNC,S$GLB,, | Performed by: OPTOMETRIST

## 2023-01-01 PROCEDURE — 82962 GLUCOSE BLOOD TEST: CPT | Mod: HCNC,91 | Performed by: SURGERY

## 2023-01-01 PROCEDURE — 94726 PULM FUNCT TST PLETHYSMOGRAP: ICD-10-PCS | Mod: S$GLB,,, | Performed by: INTERNAL MEDICINE

## 2023-01-01 PROCEDURE — 1159F MED LIST DOCD IN RCRD: CPT | Mod: HCNC,CPTII,S$GLB, | Performed by: OPTOMETRIST

## 2023-01-01 PROCEDURE — 1159F MED LIST DOCD IN RCRD: CPT | Mod: HCNC,CPTII,S$GLB, | Performed by: FAMILY MEDICINE

## 2023-01-01 PROCEDURE — 99223 1ST HOSP IP/OBS HIGH 75: CPT | Mod: HCNC,25,, | Performed by: NURSE PRACTITIONER

## 2023-01-01 PROCEDURE — 99232 PR SUBSEQUENT HOSPITAL CARE,LEVL II: ICD-10-PCS | Mod: HCNC,,, | Performed by: NURSE PRACTITIONER

## 2023-01-01 PROCEDURE — 99233 PR SUBSEQUENT HOSPITAL CARE,LEVL III: ICD-10-PCS | Mod: FS,HCNC,, | Performed by: INTERNAL MEDICINE

## 2023-01-01 PROCEDURE — D9220A PRA ANESTHESIA: Mod: HCNC,ANES,, | Performed by: ANESTHESIOLOGY

## 2023-01-01 PROCEDURE — 3072F LOW RISK FOR RETINOPATHY: CPT | Mod: HCNC,CPTII,S$GLB, | Performed by: SURGERY

## 2023-01-01 PROCEDURE — 99999 PR PBB SHADOW E&M-EST. PATIENT-LVL III: CPT | Mod: PBBFAC,HCNC,, | Performed by: OPTOMETRIST

## 2023-01-01 PROCEDURE — 1159F MED LIST DOCD IN RCRD: CPT | Mod: HCNC,CPTII,S$GLB, | Performed by: PHYSICIAN ASSISTANT

## 2023-01-01 PROCEDURE — 1111F PR DISCHARGE MEDS RECONCILED W/ CURRENT OUTPATIENT MED LIST: ICD-10-PCS | Mod: HCNC,CPTII,S$GLB, | Performed by: FAMILY MEDICINE

## 2023-01-01 PROCEDURE — 1160F RVW MEDS BY RX/DR IN RCRD: CPT | Mod: HCNC,CPTII,S$GLB, | Performed by: PHYSICIAN ASSISTANT

## 2023-01-01 PROCEDURE — 3008F PR BODY MASS INDEX (BMI) DOCUMENTED: ICD-10-PCS | Mod: HCNC,CPTII,S$GLB, | Performed by: INTERNAL MEDICINE

## 2023-01-01 PROCEDURE — 93005 ELECTROCARDIOGRAM TRACING: CPT | Mod: HCNC

## 2023-01-01 PROCEDURE — 20000000 HC ICU ROOM: Mod: HCNC

## 2023-01-01 PROCEDURE — 71046 X-RAY EXAM CHEST 2 VIEWS: CPT | Mod: TC,HCNC

## 2023-01-01 PROCEDURE — 1159F PR MEDICATION LIST DOCUMENTED IN MEDICAL RECORD: ICD-10-PCS | Mod: CPTII,S$GLB,, | Performed by: INTERNAL MEDICINE

## 2023-01-01 PROCEDURE — 99999 PR PBB SHADOW E&M-EST. PATIENT-LVL V: CPT | Mod: PBBFAC,HCNC,, | Performed by: INTERNAL MEDICINE

## 2023-01-01 PROCEDURE — 1160F RVW MEDS BY RX/DR IN RCRD: CPT | Mod: HCNC,CPTII,S$GLB, | Performed by: FAMILY MEDICINE

## 2023-01-01 PROCEDURE — 99999 PR PBB SHADOW E&M-EST. PATIENT-LVL V: CPT | Mod: PBBFAC,HCNC,, | Performed by: PHYSICIAN ASSISTANT

## 2023-01-01 PROCEDURE — 99215 PR OFFICE/OUTPT VISIT, EST, LEVL V, 40-54 MIN: ICD-10-PCS | Mod: 25,S$GLB,, | Performed by: INTERNAL MEDICINE

## 2023-01-01 PROCEDURE — 3072F LOW RISK FOR RETINOPATHY: CPT | Mod: HCNC,CPTII,S$GLB, | Performed by: NURSE PRACTITIONER

## 2023-01-01 PROCEDURE — 36415 COLL VENOUS BLD VENIPUNCTURE: CPT | Mod: HCNC,PO | Performed by: INTERNAL MEDICINE

## 2023-01-01 PROCEDURE — 99222 PR INITIAL HOSPITAL CARE,LEVL II: ICD-10-PCS | Mod: HCNC,,, | Performed by: PHYSICIAN ASSISTANT

## 2023-01-01 PROCEDURE — 25000003 PHARM REV CODE 250: Mod: HCNC | Performed by: NURSE PRACTITIONER

## 2023-01-01 PROCEDURE — 99999 PR PBB SHADOW E&M-EST. PATIENT-LVL IV: CPT | Mod: PBBFAC,HCNC,, | Performed by: INTERNAL MEDICINE

## 2023-01-01 PROCEDURE — 99291 CRITICAL CARE FIRST HOUR: CPT | Mod: HCNC,,, | Performed by: NURSE PRACTITIONER

## 2023-01-01 PROCEDURE — 71046 X-RAY EXAM CHEST 2 VIEWS: CPT | Mod: 26,HCNC,, | Performed by: RADIOLOGY

## 2023-01-01 PROCEDURE — 25000003 PHARM REV CODE 250: Mod: HCNC | Performed by: PHYSICIAN ASSISTANT

## 2023-01-01 PROCEDURE — 99214 OFFICE O/P EST MOD 30 MIN: CPT | Mod: 25,HCNC,S$GLB, | Performed by: PHYSICIAN ASSISTANT

## 2023-01-01 PROCEDURE — 36902 INTRO CATH DIALYSIS CIRCUIT: CPT | Mod: HCNC,,, | Performed by: SURGERY

## 2023-01-01 PROCEDURE — 3044F HG A1C LEVEL LT 7.0%: CPT | Mod: HCNC,CPTII,S$GLB, | Performed by: FAMILY MEDICINE

## 2023-01-01 PROCEDURE — 85025 COMPLETE CBC W/AUTO DIFF WBC: CPT | Mod: HCNC | Performed by: PHYSICIAN ASSISTANT

## 2023-01-01 PROCEDURE — 1160F RVW MEDS BY RX/DR IN RCRD: CPT | Mod: CPTII,S$GLB,, | Performed by: INTERNAL MEDICINE

## 2023-01-01 PROCEDURE — 3008F BODY MASS INDEX DOCD: CPT | Mod: CPTII,S$GLB,, | Performed by: INTERNAL MEDICINE

## 2023-01-01 PROCEDURE — 93990 PR DUPLEX HEMODIALYSIS ACCESS: ICD-10-PCS | Mod: HCNC,S$GLB,, | Performed by: SURGERY

## 2023-01-01 PROCEDURE — 3008F BODY MASS INDEX DOCD: CPT | Mod: HCNC,CPTII,S$GLB, | Performed by: NURSE PRACTITIONER

## 2023-01-01 PROCEDURE — 87449 NOS EACH ORGANISM AG IA: CPT | Mod: HCNC | Performed by: INTERNAL MEDICINE

## 2023-01-01 PROCEDURE — 99233 SBSQ HOSP IP/OBS HIGH 50: CPT | Mod: FS,HCNC,, | Performed by: INTERNAL MEDICINE

## 2023-01-01 PROCEDURE — 99496 TRANSJ CARE MGMT HIGH F2F 7D: CPT | Mod: HCNC,S$GLB,, | Performed by: FAMILY MEDICINE

## 2023-01-01 PROCEDURE — 1111F DSCHRG MED/CURRENT MED MERGE: CPT | Mod: HCNC,CPTII,, | Performed by: PHYSICIAN ASSISTANT

## 2023-01-01 PROCEDURE — 99213 PR OFFICE/OUTPT VISIT, EST, LEVL III, 20-29 MIN: ICD-10-PCS | Mod: HCNC,S$GLB,, | Performed by: SURGERY

## 2023-01-01 PROCEDURE — 90935 HEMODIALYSIS ONE EVALUATION: CPT | Mod: HCNC,,, | Performed by: NURSE PRACTITIONER

## 2023-01-01 PROCEDURE — 63600175 PHARM REV CODE 636 W HCPCS: Mod: HCNC | Performed by: PHYSICIAN ASSISTANT

## 2023-01-01 PROCEDURE — 99238 PR HOSPITAL DISCHARGE DAY,<30 MIN: ICD-10-PCS | Mod: HCNC,,, | Performed by: PHYSICIAN ASSISTANT

## 2023-01-01 PROCEDURE — 3008F PR BODY MASS INDEX (BMI) DOCUMENTED: ICD-10-PCS | Mod: HCNC,CPTII,S$GLB, | Performed by: NURSE PRACTITIONER

## 2023-01-01 PROCEDURE — 99999 PR PBB SHADOW E&M-EST. PATIENT-LVL V: CPT | Mod: PBBFAC,HCNC,, | Performed by: NURSE PRACTITIONER

## 2023-01-01 PROCEDURE — 3072F PR LOW RISK FOR RETINOPATHY: ICD-10-PCS | Mod: HCNC,CPTII,S$GLB, | Performed by: SURGERY

## 2023-01-01 PROCEDURE — 1160F PR REVIEW ALL MEDS BY PRESCRIBER/CLIN PHARMACIST DOCUMENTED: ICD-10-PCS | Mod: HCNC,CPTII,S$GLB, | Performed by: INTERNAL MEDICINE

## 2023-01-01 PROCEDURE — 3066F NEPHROPATHY DOC TX: CPT | Mod: HCNC,CPTII,S$GLB, | Performed by: FAMILY MEDICINE

## 2023-01-01 PROCEDURE — 99215 PR OFFICE/OUTPT VISIT, EST, LEVL V, 40-54 MIN: ICD-10-PCS | Mod: 25,HCNC,S$GLB, | Performed by: PHYSICIAN ASSISTANT

## 2023-01-01 PROCEDURE — 94726 PLETHYSMOGRAPHY LUNG VOLUMES: CPT | Mod: S$GLB,,, | Performed by: INTERNAL MEDICINE

## 2023-01-01 PROCEDURE — 99233 SBSQ HOSP IP/OBS HIGH 50: CPT | Mod: HCNC,,, | Performed by: PHYSICIAN ASSISTANT

## 2023-01-01 PROCEDURE — C1725 CATH, TRANSLUMIN NON-LASER: HCPCS | Mod: HCNC | Performed by: SURGERY

## 2023-01-01 PROCEDURE — 99233 PR SUBSEQUENT HOSPITAL CARE,LEVL III: ICD-10-PCS | Mod: HCNC,,, | Performed by: NURSE PRACTITIONER

## 2023-01-01 PROCEDURE — 3072F LOW RISK FOR RETINOPATHY: CPT | Mod: HCNC,CPTII,S$GLB, | Performed by: PHYSICIAN ASSISTANT

## 2023-01-01 PROCEDURE — 3008F PR BODY MASS INDEX (BMI) DOCUMENTED: ICD-10-PCS | Mod: HCNC,CPTII,S$GLB, | Performed by: PHYSICIAN ASSISTANT

## 2023-01-01 PROCEDURE — 99291 PR CRITICAL CARE, E/M 30-74 MINUTES: ICD-10-PCS | Mod: HCNC,,, | Performed by: NURSE PRACTITIONER

## 2023-01-01 PROCEDURE — D9220A PRA ANESTHESIA: ICD-10-PCS | Mod: HCNC,ANES,, | Performed by: ANESTHESIOLOGY

## 2023-01-01 PROCEDURE — 3079F PR MOST RECENT DIASTOLIC BLOOD PRESSURE 80-89 MM HG: ICD-10-PCS | Mod: HCNC,CPTII,S$GLB, | Performed by: INTERNAL MEDICINE

## 2023-01-01 PROCEDURE — 3044F PR MOST RECENT HEMOGLOBIN A1C LEVEL <7.0%: ICD-10-PCS | Mod: HCNC,CPTII,S$GLB, | Performed by: FAMILY MEDICINE

## 2023-01-01 PROCEDURE — 3008F PR BODY MASS INDEX (BMI) DOCUMENTED: ICD-10-PCS | Mod: CPTII,S$GLB,, | Performed by: INTERNAL MEDICINE

## 2023-01-01 PROCEDURE — 99291 PR CRITICAL CARE, E/M 30-74 MINUTES: ICD-10-PCS | Mod: HCNC,,, | Performed by: INTERNAL MEDICINE

## 2023-01-01 PROCEDURE — 36415 COLL VENOUS BLD VENIPUNCTURE: CPT | Mod: HCNC | Performed by: INTERNAL MEDICINE

## 2023-01-01 PROCEDURE — 80048 BASIC METABOLIC PNL TOTAL CA: CPT | Mod: HCNC | Performed by: ANESTHESIOLOGY

## 2023-01-01 PROCEDURE — 90935 PR HEMODIALYSIS, ONE EVALUATION: ICD-10-PCS | Mod: ,,, | Performed by: INTERNAL MEDICINE

## 2023-01-01 PROCEDURE — 99999 PR PBB SHADOW E&M-EST. PATIENT-LVL II: CPT | Mod: PBBFAC,HCNC,, | Performed by: SURGERY

## 2023-01-01 PROCEDURE — 99291 PR CRITICAL CARE, E/M 30-74 MINUTES: ICD-10-PCS | Mod: HCNC,GC,, | Performed by: INTERNAL MEDICINE

## 2023-01-01 PROCEDURE — 99496 TRANSITIONAL CARE MANAGE SERVICE 7 DAY DISCHARGE: ICD-10-PCS | Mod: HCNC,S$GLB,, | Performed by: FAMILY MEDICINE

## 2023-01-01 PROCEDURE — 94729 DIFFUSING CAPACITY: CPT | Mod: S$GLB,,, | Performed by: INTERNAL MEDICINE

## 2023-01-01 PROCEDURE — 94010 BREATHING CAPACITY TEST: CPT | Mod: HCNC | Performed by: INTERNAL MEDICINE

## 2023-01-01 PROCEDURE — 3075F SYST BP GE 130 - 139MM HG: CPT | Mod: HCNC,CPTII,S$GLB, | Performed by: SURGERY

## 2023-01-01 PROCEDURE — 3080F PR MOST RECENT DIASTOLIC BLOOD PRESSURE >= 90 MM HG: ICD-10-PCS | Mod: HCNC,CPTII,S$GLB, | Performed by: NURSE PRACTITIONER

## 2023-01-01 PROCEDURE — 84100 ASSAY OF PHOSPHORUS: CPT | Mod: HCNC | Performed by: INTERNAL MEDICINE

## 2023-01-01 PROCEDURE — 3077F SYST BP >= 140 MM HG: CPT | Mod: HCNC,CPTII,S$GLB, | Performed by: SURGERY

## 2023-01-01 PROCEDURE — 1160F RVW MEDS BY RX/DR IN RCRD: CPT | Mod: HCNC,CPTII,S$GLB, | Performed by: NURSE PRACTITIONER

## 2023-01-01 PROCEDURE — 3077F PR MOST RECENT SYSTOLIC BLOOD PRESSURE >= 140 MM HG: ICD-10-PCS | Mod: HCNC,CPTII,S$GLB, | Performed by: INTERNAL MEDICINE

## 2023-01-01 PROCEDURE — 1159F MED LIST DOCD IN RCRD: CPT | Mod: CPTII,S$GLB,, | Performed by: INTERNAL MEDICINE

## 2023-01-01 PROCEDURE — 3066F PR DOCUMENTATION OF TREATMENT FOR NEPHROPATHY: ICD-10-PCS | Mod: HCNC,CPTII,S$GLB, | Performed by: INTERNAL MEDICINE

## 2023-01-01 PROCEDURE — 3078F DIAST BP <80 MM HG: CPT | Mod: HCNC,CPTII,S$GLB, | Performed by: SURGERY

## 2023-01-01 PROCEDURE — 99204 OFFICE O/P NEW MOD 45 MIN: CPT | Mod: HCNC,S$GLB,, | Performed by: INTERNAL MEDICINE

## 2023-01-01 PROCEDURE — 94618 PULMONARY STRESS TESTING: ICD-10-PCS | Mod: HCNC,S$GLB,, | Performed by: INTERNAL MEDICINE

## 2023-01-01 PROCEDURE — 3044F HG A1C LEVEL LT 7.0%: CPT | Mod: HCNC,CPTII,S$GLB, | Performed by: INTERNAL MEDICINE

## 2023-01-01 PROCEDURE — 3077F SYST BP >= 140 MM HG: CPT | Mod: HCNC,CPTII,S$GLB, | Performed by: NURSE PRACTITIONER

## 2023-01-01 PROCEDURE — 93010 ELECTROCARDIOGRAM REPORT: CPT | Mod: HCNC,S$GLB,, | Performed by: INTERNAL MEDICINE

## 2023-01-01 PROCEDURE — 94010 BREATHING CAPACITY TEST: CPT | Mod: 26,HCNC,, | Performed by: INTERNAL MEDICINE

## 2023-01-01 PROCEDURE — 27000190 HC CPAP FULL FACE MASK W/VALVE: Mod: HCNC

## 2023-01-01 PROCEDURE — 80100014 HC HEMODIALYSIS 1:1: Mod: HCNC

## 2023-01-01 PROCEDURE — 1111F PR DISCHARGE MEDS RECONCILED W/ CURRENT OUTPATIENT MED LIST: ICD-10-PCS | Mod: HCNC,CPTII,, | Performed by: PHYSICIAN ASSISTANT

## 2023-01-01 PROCEDURE — 3080F PR MOST RECENT DIASTOLIC BLOOD PRESSURE >= 90 MM HG: ICD-10-PCS | Mod: HCNC,CPTII,S$GLB, | Performed by: INTERNAL MEDICINE

## 2023-01-01 PROCEDURE — 25000003 PHARM REV CODE 250: Mod: HCNC | Performed by: NURSE ANESTHETIST, CERTIFIED REGISTERED

## 2023-01-01 PROCEDURE — 3077F SYST BP >= 140 MM HG: CPT | Mod: HCNC,CPTII,S$GLB, | Performed by: PHYSICIAN ASSISTANT

## 2023-01-01 PROCEDURE — 3077F PR MOST RECENT SYSTOLIC BLOOD PRESSURE >= 140 MM HG: ICD-10-PCS | Mod: HCNC,CPTII,S$GLB, | Performed by: SURGERY

## 2023-01-01 PROCEDURE — 99999 PR PBB SHADOW E&M-EST. PATIENT-LVL IV: ICD-10-PCS | Mod: PBBFAC,HCNC,, | Performed by: INTERNAL MEDICINE

## 2023-01-01 PROCEDURE — 3008F BODY MASS INDEX DOCD: CPT | Mod: HCNC,CPTII,S$GLB, | Performed by: SURGERY

## 2023-01-01 PROCEDURE — 90935 HEMODIALYSIS ONE EVALUATION: CPT | Mod: HCNC,,, | Performed by: INTERNAL MEDICINE

## 2023-01-01 PROCEDURE — 3072F PR LOW RISK FOR RETINOPATHY: ICD-10-PCS | Mod: HCNC,CPTII,S$GLB, | Performed by: NURSE PRACTITIONER

## 2023-01-01 PROCEDURE — 99238 HOSP IP/OBS DSCHRG MGMT 30/<: CPT | Mod: HCNC,,, | Performed by: PHYSICIAN ASSISTANT

## 2023-01-01 PROCEDURE — 85007 BL SMEAR W/DIFF WBC COUNT: CPT | Mod: HCNC | Performed by: INTERNAL MEDICINE

## 2023-01-01 PROCEDURE — 99999 PR PBB SHADOW E&M-EST. PATIENT-LVL III: ICD-10-PCS | Mod: PBBFAC,,, | Performed by: INTERNAL MEDICINE

## 2023-01-01 PROCEDURE — 36902 PR INTRO CATH, DIALYSIS CIRCUIT W/TRANSLML BALLOON ANGIO: ICD-10-PCS | Mod: HCNC,,, | Performed by: SURGERY

## 2023-01-01 PROCEDURE — 84443 ASSAY THYROID STIM HORMONE: CPT | Mod: HCNC | Performed by: PHYSICIAN ASSISTANT

## 2023-01-01 PROCEDURE — 99291 PR CRITICAL CARE, E/M 30-74 MINUTES: ICD-10-PCS | Mod: HCNC,,, | Performed by: PHYSICIAN ASSISTANT

## 2023-01-01 PROCEDURE — 80048 BASIC METABOLIC PNL TOTAL CA: CPT | Mod: HCNC | Performed by: INTERNAL MEDICINE

## 2023-01-01 PROCEDURE — 71000044 HC DOSC ROUTINE RECOVERY FIRST HOUR: Mod: HCNC | Performed by: SURGERY

## 2023-01-01 PROCEDURE — 99204 PR OFFICE/OUTPT VISIT, NEW, LEVL IV, 45-59 MIN: ICD-10-PCS | Mod: HCNC,S$GLB,, | Performed by: INTERNAL MEDICINE

## 2023-01-01 PROCEDURE — 93010 EKG 12-LEAD: ICD-10-PCS | Mod: HCNC,S$GLB,, | Performed by: INTERNAL MEDICINE

## 2023-01-01 PROCEDURE — 3074F SYST BP LT 130 MM HG: CPT | Mod: HCNC,CPTII,S$GLB, | Performed by: FAMILY MEDICINE

## 2023-01-01 PROCEDURE — 87633 RESP VIRUS 12-25 TARGETS: CPT | Mod: HCNC | Performed by: STUDENT IN AN ORGANIZED HEALTH CARE EDUCATION/TRAINING PROGRAM

## 2023-01-01 PROCEDURE — 3008F BODY MASS INDEX DOCD: CPT | Mod: HCNC,CPTII,S$GLB, | Performed by: FAMILY MEDICINE

## 2023-01-01 PROCEDURE — 27201037 HC PRESSURE MONITORING SET UP: Mod: HCNC

## 2023-01-01 PROCEDURE — 99291 CRITICAL CARE FIRST HOUR: CPT | Mod: HCNC,,, | Performed by: PHYSICIAN ASSISTANT

## 2023-01-01 PROCEDURE — 99233 PR SUBSEQUENT HOSPITAL CARE,LEVL III: ICD-10-PCS | Mod: HCNC,,, | Performed by: PHYSICIAN ASSISTANT

## 2023-01-01 PROCEDURE — 92015 PR REFRACTION: ICD-10-PCS | Mod: HCNC,S$GLB,, | Performed by: OPTOMETRIST

## 2023-01-01 PROCEDURE — 1160F PR REVIEW ALL MEDS BY PRESCRIBER/CLIN PHARMACIST DOCUMENTED: ICD-10-PCS | Mod: HCNC,CPTII,S$GLB, | Performed by: FAMILY MEDICINE

## 2023-01-01 PROCEDURE — 92014 COMPRE OPH EXAM EST PT 1/>: CPT | Mod: HCNC,S$GLB,, | Performed by: OPTOMETRIST

## 2023-01-01 PROCEDURE — 3077F PR MOST RECENT SYSTOLIC BLOOD PRESSURE >= 140 MM HG: ICD-10-PCS | Mod: HCNC,CPTII,S$GLB, | Performed by: PHYSICIAN ASSISTANT

## 2023-01-01 PROCEDURE — 97530 THERAPEUTIC ACTIVITIES: CPT | Mod: HCNC

## 2023-01-01 PROCEDURE — 3080F DIAST BP >= 90 MM HG: CPT | Mod: HCNC,CPTII,S$GLB, | Performed by: INTERNAL MEDICINE

## 2023-01-01 PROCEDURE — D9220A PRA ANESTHESIA: Mod: HCNC,CRNA,, | Performed by: NURSE ANESTHETIST, CERTIFIED REGISTERED

## 2023-01-01 PROCEDURE — 99215 OFFICE O/P EST HI 40 MIN: CPT | Mod: 25,S$GLB,, | Performed by: INTERNAL MEDICINE

## 2023-01-01 PROCEDURE — 3080F DIAST BP >= 90 MM HG: CPT | Mod: CPTII,S$GLB,, | Performed by: INTERNAL MEDICINE

## 2023-01-01 PROCEDURE — 85027 COMPLETE CBC AUTOMATED: CPT | Mod: HCNC | Performed by: INTERNAL MEDICINE

## 2023-01-01 PROCEDURE — 3044F PR MOST RECENT HEMOGLOBIN A1C LEVEL <7.0%: ICD-10-PCS | Mod: HCNC,CPTII,S$GLB, | Performed by: INTERNAL MEDICINE

## 2023-01-01 PROCEDURE — 99213 OFFICE O/P EST LOW 20 MIN: CPT | Mod: HCNC,S$GLB,, | Performed by: SURGERY

## 2023-01-01 PROCEDURE — 99214 PR OFFICE/OUTPT VISIT, EST, LEVL IV, 30-39 MIN: ICD-10-PCS | Mod: 25,HCNC,S$GLB, | Performed by: PHYSICIAN ASSISTANT

## 2023-01-01 PROCEDURE — 1111F DSCHRG MED/CURRENT MED MERGE: CPT | Mod: HCNC,CPTII,S$GLB, | Performed by: INTERNAL MEDICINE

## 2023-01-01 PROCEDURE — 1159F MED LIST DOCD IN RCRD: CPT | Mod: HCNC,CPTII,S$GLB, | Performed by: NURSE PRACTITIONER

## 2023-01-01 PROCEDURE — 37000009 HC ANESTHESIA EA ADD 15 MINS: Mod: HCNC | Performed by: SURGERY

## 2023-01-01 PROCEDURE — 3077F PR MOST RECENT SYSTOLIC BLOOD PRESSURE >= 140 MM HG: ICD-10-PCS | Mod: CPTII,S$GLB,, | Performed by: INTERNAL MEDICINE

## 2023-01-01 PROCEDURE — 80053 COMPREHEN METABOLIC PANEL: CPT | Mod: HCNC | Performed by: NURSE PRACTITIONER

## 2023-01-01 PROCEDURE — G9919 PR SCREENING AND POSITIVE: ICD-10-PCS | Mod: HCNC,CPTII,S$GLB, | Performed by: NURSE PRACTITIONER

## 2023-01-01 PROCEDURE — 97162 PT EVAL MOD COMPLEX 30 MIN: CPT | Mod: HCNC

## 2023-01-01 PROCEDURE — 86403 PARTICLE AGGLUT ANTBDY SCRN: CPT | Mod: HCNC | Performed by: INTERNAL MEDICINE

## 2023-01-01 PROCEDURE — 82330 ASSAY OF CALCIUM: CPT | Mod: HCNC | Performed by: PHYSICIAN ASSISTANT

## 2023-01-01 PROCEDURE — 36415 COLL VENOUS BLD VENIPUNCTURE: CPT | Mod: HCNC | Performed by: NURSE PRACTITIONER

## 2023-01-01 PROCEDURE — 3008F PR BODY MASS INDEX (BMI) DOCUMENTED: ICD-10-PCS | Mod: HCNC,CPTII,S$GLB, | Performed by: FAMILY MEDICINE

## 2023-01-01 PROCEDURE — 99233 SBSQ HOSP IP/OBS HIGH 50: CPT | Mod: HCNC,,, | Performed by: INTERNAL MEDICINE

## 2023-01-01 PROCEDURE — 71250 CT CHEST WITHOUT CONTRAST: ICD-10-PCS | Mod: 26,,, | Performed by: STUDENT IN AN ORGANIZED HEALTH CARE EDUCATION/TRAINING PROGRAM

## 2023-01-01 PROCEDURE — 83970 ASSAY OF PARATHORMONE: CPT | Mod: HCNC | Performed by: NURSE PRACTITIONER

## 2023-01-01 PROCEDURE — 99999 PR PBB SHADOW E&M-EST. PATIENT-LVL III: CPT | Mod: PBBFAC,,, | Performed by: INTERNAL MEDICINE

## 2023-01-01 PROCEDURE — 4010F PR ACE/ARB THEARPY RXD/TAKEN: ICD-10-PCS | Mod: HCNC,CPTII,S$GLB, | Performed by: INTERNAL MEDICINE

## 2023-01-01 PROCEDURE — 84145 PROCALCITONIN (PCT): CPT | Mod: HCNC | Performed by: INTERNAL MEDICINE

## 2023-01-01 PROCEDURE — G0439 PR MEDICARE ANNUAL WELLNESS SUBSEQUENT VISIT: ICD-10-PCS | Mod: HCNC,S$GLB,, | Performed by: NURSE PRACTITIONER

## 2023-01-01 PROCEDURE — 99215 OFFICE O/P EST HI 40 MIN: CPT | Mod: 25,HCNC,S$GLB, | Performed by: INTERNAL MEDICINE

## 2023-01-01 PROCEDURE — 99214 OFFICE O/P EST MOD 30 MIN: CPT | Mod: 25,HCNC,S$GLB, | Performed by: INTERNAL MEDICINE

## 2023-01-01 PROCEDURE — 99999 PR PBB SHADOW E&M-EST. PATIENT-LVL V: ICD-10-PCS | Mod: PBBFAC,HCNC,, | Performed by: PHYSICIAN ASSISTANT

## 2023-01-01 PROCEDURE — 3080F DIAST BP >= 90 MM HG: CPT | Mod: HCNC,CPTII,S$GLB, | Performed by: NURSE PRACTITIONER

## 2023-01-01 PROCEDURE — 27201423 OPTIME MED/SURG SUP & DEVICES STERILE SUPPLY: Mod: HCNC | Performed by: SURGERY

## 2023-01-01 PROCEDURE — 3072F PR LOW RISK FOR RETINOPATHY: ICD-10-PCS | Mod: HCNC,CPTII,S$GLB, | Performed by: PHYSICIAN ASSISTANT

## 2023-01-01 PROCEDURE — 87798 DETECT AGENT NOS DNA AMP: CPT | Mod: 59,HCNC | Performed by: STUDENT IN AN ORGANIZED HEALTH CARE EDUCATION/TRAINING PROGRAM

## 2023-01-01 PROCEDURE — 1160F PR REVIEW ALL MEDS BY PRESCRIBER/CLIN PHARMACIST DOCUMENTED: ICD-10-PCS | Mod: CPTII,S$GLB,, | Performed by: INTERNAL MEDICINE

## 2023-01-01 PROCEDURE — 3080F PR MOST RECENT DIASTOLIC BLOOD PRESSURE >= 90 MM HG: ICD-10-PCS | Mod: HCNC,CPTII,S$GLB, | Performed by: SURGERY

## 2023-01-01 PROCEDURE — 99215 PR OFFICE/OUTPT VISIT, EST, LEVL V, 40-54 MIN: ICD-10-PCS | Mod: 25,HCNC,S$GLB, | Performed by: NURSE PRACTITIONER

## 2023-01-01 PROCEDURE — 94010 BREATHING CAPACITY TEST: ICD-10-PCS | Mod: S$GLB,,, | Performed by: INTERNAL MEDICINE

## 2023-01-01 PROCEDURE — 36000707: Mod: HCNC | Performed by: SURGERY

## 2023-01-01 PROCEDURE — 90935 HEMODIALYSIS ONE EVALUATION: CPT | Mod: HCNC

## 2023-01-01 PROCEDURE — 3078F PR MOST RECENT DIASTOLIC BLOOD PRESSURE < 80 MM HG: ICD-10-PCS | Mod: HCNC,CPTII,S$GLB, | Performed by: SURGERY

## 2023-01-01 PROCEDURE — 99999 PR PBB SHADOW E&M-EST. PATIENT-LVL V: CPT | Mod: PBBFAC,HCNC,, | Performed by: FAMILY MEDICINE

## 2023-01-01 PROCEDURE — 83036 HEMOGLOBIN GLYCOSYLATED A1C: CPT | Mod: HCNC | Performed by: PHYSICIAN ASSISTANT

## 2023-01-01 PROCEDURE — D9220A PRA ANESTHESIA: ICD-10-PCS | Mod: HCNC,CRNA,, | Performed by: NURSE ANESTHETIST, CERTIFIED REGISTERED

## 2023-01-01 PROCEDURE — 71250 CT THORAX DX C-: CPT | Mod: TC

## 2023-01-01 PROCEDURE — 3072F PR LOW RISK FOR RETINOPATHY: ICD-10-PCS | Mod: HCNC,CPTII,S$GLB, | Performed by: FAMILY MEDICINE

## 2023-01-01 PROCEDURE — 83605 ASSAY OF LACTIC ACID: CPT | Mod: HCNC | Performed by: PHYSICIAN ASSISTANT

## 2023-01-01 PROCEDURE — 99214 PR OFFICE/OUTPT VISIT, EST, LEVL IV, 30-39 MIN: ICD-10-PCS | Mod: 25,HCNC,S$GLB, | Performed by: INTERNAL MEDICINE

## 2023-01-01 PROCEDURE — 3074F PR MOST RECENT SYSTOLIC BLOOD PRESSURE < 130 MM HG: ICD-10-PCS | Mod: HCNC,CPTII,S$GLB, | Performed by: NURSE PRACTITIONER

## 2023-01-01 PROCEDURE — 99233 SBSQ HOSP IP/OBS HIGH 50: CPT | Mod: HCNC,,, | Performed by: NURSE PRACTITIONER

## 2023-01-01 PROCEDURE — 99999 PR PBB SHADOW E&M-EST. PATIENT-LVL II: ICD-10-PCS | Mod: PBBFAC,HCNC,, | Performed by: SURGERY

## 2023-01-01 PROCEDURE — 94010 BREATHING CAPACITY TEST: CPT | Mod: S$GLB,,, | Performed by: INTERNAL MEDICINE

## 2023-01-01 PROCEDURE — G9919 SCRN ND POS ND PROV OF REC: HCPCS | Mod: HCNC,CPTII,S$GLB, | Performed by: NURSE PRACTITIONER

## 2023-01-01 PROCEDURE — 99291 CRITICAL CARE FIRST HOUR: CPT | Mod: HCNC,GC,, | Performed by: INTERNAL MEDICINE

## 2023-01-01 PROCEDURE — 3079F DIAST BP 80-89 MM HG: CPT | Mod: HCNC,CPTII,S$GLB, | Performed by: INTERNAL MEDICINE

## 2023-01-01 PROCEDURE — 99232 SBSQ HOSP IP/OBS MODERATE 35: CPT | Mod: HCNC,,, | Performed by: PHYSICIAN ASSISTANT

## 2023-01-01 PROCEDURE — 3077F SYST BP >= 140 MM HG: CPT | Mod: CPTII,S$GLB,, | Performed by: INTERNAL MEDICINE

## 2023-01-01 PROCEDURE — 92014 PR EYE EXAM, EST PATIENT,COMPREHESV: ICD-10-PCS | Mod: HCNC,S$GLB,, | Performed by: OPTOMETRIST

## 2023-01-01 PROCEDURE — 99215 OFFICE O/P EST HI 40 MIN: CPT | Mod: 25,HCNC,S$GLB, | Performed by: NURSE PRACTITIONER

## 2023-01-01 PROCEDURE — 3080F DIAST BP >= 90 MM HG: CPT | Mod: HCNC,CPTII,S$GLB, | Performed by: SURGERY

## 2023-01-01 PROCEDURE — 63600175 PHARM REV CODE 636 W HCPCS: Mod: HCNC | Performed by: NURSE ANESTHETIST, CERTIFIED REGISTERED

## 2023-01-01 PROCEDURE — 1160F PR REVIEW ALL MEDS BY PRESCRIBER/CLIN PHARMACIST DOCUMENTED: ICD-10-PCS | Mod: HCNC,CPTII,S$GLB, | Performed by: PHYSICIAN ASSISTANT

## 2023-01-01 PROCEDURE — 87340 HEPATITIS B SURFACE AG IA: CPT | Mod: HCNC | Performed by: INTERNAL MEDICINE

## 2023-01-01 PROCEDURE — 1160F RVW MEDS BY RX/DR IN RCRD: CPT | Mod: HCNC,CPTII,S$GLB, | Performed by: INTERNAL MEDICINE

## 2023-01-01 PROCEDURE — 3072F LOW RISK FOR RETINOPATHY: CPT | Mod: CPTII,S$GLB,, | Performed by: INTERNAL MEDICINE

## 2023-01-01 PROCEDURE — 97530 THERAPEUTIC ACTIVITIES: CPT | Mod: HCNC,CQ

## 2023-01-01 PROCEDURE — 94729 PR C02/MEMBANE DIFFUSE CAPACITY: ICD-10-PCS | Mod: S$GLB,,, | Performed by: INTERNAL MEDICINE

## 2023-01-01 PROCEDURE — 25000003 PHARM REV CODE 250: Mod: HCNC | Performed by: ANESTHESIOLOGY

## 2023-01-01 PROCEDURE — 3075F PR MOST RECENT SYSTOLIC BLOOD PRESS GE 130-139MM HG: ICD-10-PCS | Mod: HCNC,CPTII,S$GLB, | Performed by: SURGERY

## 2023-01-01 RX ORDER — MONTELUKAST SODIUM 10 MG/1
10 TABLET ORAL NIGHTLY
Qty: 30 TABLET | Refills: 0 | Status: SHIPPED | OUTPATIENT
Start: 2023-01-01 | End: 2023-01-01 | Stop reason: SDUPTHER

## 2023-01-01 RX ORDER — ROSUVASTATIN CALCIUM 10 MG/1
10 TABLET, COATED ORAL DAILY
Qty: 30 TABLET | Refills: 3 | Status: SHIPPED | OUTPATIENT
Start: 2023-01-01

## 2023-01-01 RX ORDER — MORPHINE SULFATE 2 MG/ML
2 INJECTION, SOLUTION INTRAMUSCULAR; INTRAVENOUS EVERY 4 HOURS PRN
Status: DISCONTINUED | OUTPATIENT
Start: 2023-01-01 | End: 2023-01-01 | Stop reason: HOSPADM

## 2023-01-01 RX ORDER — DEXMEDETOMIDINE HYDROCHLORIDE 4 UG/ML
INJECTION, SOLUTION INTRAVENOUS
Status: COMPLETED
Start: 2023-01-01 | End: 2023-01-01

## 2023-01-01 RX ORDER — EVEROLIMUS 0.75 MG/1
1.5 TABLET ORAL EVERY MORNING
Status: DISCONTINUED | OUTPATIENT
Start: 2023-01-01 | End: 2023-01-01

## 2023-01-01 RX ORDER — METOPROLOL TARTRATE 25 MG/1
25 TABLET, FILM COATED ORAL 2 TIMES DAILY
Status: DISCONTINUED | OUTPATIENT
Start: 2023-01-01 | End: 2023-01-01

## 2023-01-01 RX ORDER — ACETAMINOPHEN 500 MG
1000 TABLET ORAL EVERY 6 HOURS PRN
Status: DISCONTINUED | OUTPATIENT
Start: 2023-01-01 | End: 2023-01-01 | Stop reason: HOSPADM

## 2023-01-01 RX ORDER — SULFAMETHOXAZOLE AND TRIMETHOPRIM 400; 80 MG/1; MG/1
1 TABLET ORAL
Qty: 15 TABLET | Refills: 11 | Status: SHIPPED | OUTPATIENT
Start: 2023-01-01

## 2023-01-01 RX ORDER — LIDOCAINE HYDROCHLORIDE 20 MG/ML
INJECTION INTRAVENOUS
Status: DISCONTINUED | OUTPATIENT
Start: 2023-01-01 | End: 2023-01-01

## 2023-01-01 RX ORDER — FAMOTIDINE 20 MG/1
20 TABLET, FILM COATED ORAL DAILY
Status: DISCONTINUED | OUTPATIENT
Start: 2023-01-01 | End: 2023-01-01 | Stop reason: HOSPADM

## 2023-01-01 RX ORDER — ATORVASTATIN CALCIUM 20 MG/1
20 TABLET, FILM COATED ORAL DAILY
Status: DISCONTINUED | OUTPATIENT
Start: 2023-01-01 | End: 2023-01-01 | Stop reason: HOSPADM

## 2023-01-01 RX ORDER — FAMOTIDINE 20 MG/1
20 TABLET, FILM COATED ORAL DAILY
Status: ON HOLD | COMMUNITY
End: 2023-01-01 | Stop reason: HOSPADM

## 2023-01-01 RX ORDER — HEPARIN SODIUM 5000 [USP'U]/ML
5000 INJECTION, SOLUTION INTRAVENOUS; SUBCUTANEOUS EVERY 8 HOURS
Status: DISCONTINUED | OUTPATIENT
Start: 2023-01-01 | End: 2023-01-01 | Stop reason: HOSPADM

## 2023-01-01 RX ORDER — GLUCAGON 1 MG
1 KIT INJECTION
Status: DISCONTINUED | OUTPATIENT
Start: 2023-01-01 | End: 2023-01-01 | Stop reason: HOSPADM

## 2023-01-01 RX ORDER — EVEROLIMUS 0.75 MG/1
1.5 TABLET ORAL DAILY
Status: DISCONTINUED | OUTPATIENT
Start: 2023-01-01 | End: 2023-01-01

## 2023-01-01 RX ORDER — FLUTICASONE PROPIONATE 50 MCG
1 SPRAY, SUSPENSION (ML) NASAL DAILY
Qty: 16 G | Refills: 5 | Status: SHIPPED | OUTPATIENT
Start: 2023-01-01

## 2023-01-01 RX ORDER — GLUC/MSM/COLGN2/HYAL/ANTIARTH3 375-375-20
1 TABLET ORAL 2 TIMES DAILY
Start: 2023-01-01

## 2023-01-01 RX ORDER — SODIUM CHLORIDE 9 MG/ML
INJECTION, SOLUTION INTRAVENOUS ONCE
Status: COMPLETED | OUTPATIENT
Start: 2023-01-01 | End: 2023-01-01

## 2023-01-01 RX ORDER — METHYLPREDNISOLONE SOD SUCC 125 MG
125 VIAL (EA) INJECTION EVERY 6 HOURS
Status: DISCONTINUED | OUTPATIENT
Start: 2023-01-01 | End: 2023-01-01

## 2023-01-01 RX ORDER — IBUPROFEN 200 MG
16 TABLET ORAL
Status: DISCONTINUED | OUTPATIENT
Start: 2023-01-01 | End: 2023-01-01 | Stop reason: HOSPADM

## 2023-01-01 RX ORDER — INSULIN ASPART 100 [IU]/ML
1-10 INJECTION, SOLUTION INTRAVENOUS; SUBCUTANEOUS
Status: DISCONTINUED | OUTPATIENT
Start: 2023-01-01 | End: 2023-01-01 | Stop reason: HOSPADM

## 2023-01-01 RX ORDER — CINACALCET 30 MG/1
30 TABLET, FILM COATED ORAL
Status: DISCONTINUED | OUTPATIENT
Start: 2023-01-01 | End: 2023-01-01 | Stop reason: HOSPADM

## 2023-01-01 RX ORDER — PREDNISONE 5 MG/1
5 TABLET ORAL DAILY
Status: DISCONTINUED | OUTPATIENT
Start: 2023-01-01 | End: 2023-01-01 | Stop reason: HOSPADM

## 2023-01-01 RX ORDER — ACETAMINOPHEN 500 MG
1000 TABLET ORAL
Status: COMPLETED | OUTPATIENT
Start: 2023-01-01 | End: 2023-01-01

## 2023-01-01 RX ORDER — EVEROLIMUS 0.75 MG/1
0.75 TABLET ORAL NIGHTLY
Status: DISCONTINUED | OUTPATIENT
Start: 2023-01-01 | End: 2023-01-01

## 2023-01-01 RX ORDER — ASPIRIN 81 MG/1
81 TABLET ORAL DAILY
Status: DISCONTINUED | OUTPATIENT
Start: 2023-01-01 | End: 2023-01-01 | Stop reason: HOSPADM

## 2023-01-01 RX ORDER — EVEROLIMUS 0.75 MG/1
0.75 TABLET ORAL EVERY EVENING
Status: DISCONTINUED | OUTPATIENT
Start: 2023-01-01 | End: 2023-01-01

## 2023-01-01 RX ORDER — INSULIN ASPART 100 [IU]/ML
4 INJECTION, SOLUTION INTRAVENOUS; SUBCUTANEOUS
Status: DISCONTINUED | OUTPATIENT
Start: 2023-01-01 | End: 2023-01-01 | Stop reason: HOSPADM

## 2023-01-01 RX ORDER — DEXMEDETOMIDINE HYDROCHLORIDE 100 UG/ML
INJECTION, SOLUTION INTRAVENOUS
Status: DISCONTINUED | OUTPATIENT
Start: 2023-01-01 | End: 2023-01-01

## 2023-01-01 RX ORDER — SODIUM CHLORIDE 9 MG/ML
INJECTION, SOLUTION INTRAVENOUS ONCE
Status: DISCONTINUED | OUTPATIENT
Start: 2023-01-01 | End: 2023-01-01

## 2023-01-01 RX ORDER — PHENYLEPHRINE HYDROCHLORIDE 10 MG/ML
INJECTION INTRAVENOUS
Status: DISCONTINUED | OUTPATIENT
Start: 2023-01-01 | End: 2023-01-01

## 2023-01-01 RX ORDER — FUROSEMIDE 40 MG/1
40 TABLET ORAL DAILY
Status: DISCONTINUED | OUTPATIENT
Start: 2023-01-01 | End: 2023-01-01

## 2023-01-01 RX ORDER — AZITHROMYCIN 250 MG/1
250 TABLET, FILM COATED ORAL
Qty: 13 TABLET | Refills: 11 | Status: SHIPPED | OUTPATIENT
Start: 2023-01-01

## 2023-01-01 RX ORDER — DEXMEDETOMIDINE HYDROCHLORIDE 4 UG/ML
0-1.4 INJECTION, SOLUTION INTRAVENOUS CONTINUOUS
Status: DISCONTINUED | OUTPATIENT
Start: 2023-01-01 | End: 2023-01-01

## 2023-01-01 RX ORDER — FENTANYL CITRATE 50 UG/ML
25 INJECTION, SOLUTION INTRAMUSCULAR; INTRAVENOUS EVERY 5 MIN PRN
Status: DISCONTINUED | OUTPATIENT
Start: 2023-01-01 | End: 2023-01-01 | Stop reason: HOSPADM

## 2023-01-01 RX ORDER — RAMELTEON 8 MG/1
8 TABLET ORAL NIGHTLY
Qty: 30 TABLET | Refills: 0 | Status: SHIPPED | OUTPATIENT
Start: 2023-01-01 | End: 2023-01-01 | Stop reason: SDUPTHER

## 2023-01-01 RX ORDER — CLONIDINE HYDROCHLORIDE 0.1 MG/1
0.1 TABLET ORAL 3 TIMES DAILY
Status: DISCONTINUED | OUTPATIENT
Start: 2023-01-01 | End: 2023-01-01

## 2023-01-01 RX ORDER — CARVEDILOL 3.12 MG/1
3.12 TABLET ORAL
COMMUNITY
End: 2023-01-01 | Stop reason: ALTCHOICE

## 2023-01-01 RX ORDER — LIDOCAINE HYDROCHLORIDE 10 MG/ML
INJECTION, SOLUTION EPIDURAL; INFILTRATION; INTRACAUDAL; PERINEURAL
Status: DISCONTINUED | OUTPATIENT
Start: 2023-01-01 | End: 2023-01-01 | Stop reason: HOSPADM

## 2023-01-01 RX ORDER — LOSARTAN POTASSIUM 25 MG/1
25 TABLET ORAL DAILY
Qty: 90 TABLET | Refills: 3 | Status: SHIPPED | OUTPATIENT
Start: 2023-01-01 | End: 2024-09-19

## 2023-01-01 RX ORDER — LOSARTAN POTASSIUM 25 MG/1
25 TABLET ORAL DAILY
Qty: 90 TABLET | Refills: 3 | Status: SHIPPED | OUTPATIENT
Start: 2023-01-01 | End: 2023-01-01

## 2023-01-01 RX ORDER — MONTELUKAST SODIUM 10 MG/1
10 TABLET ORAL NIGHTLY
Qty: 30 TABLET | Refills: 0 | Status: SHIPPED | OUTPATIENT
Start: 2023-01-01 | End: 2023-11-10

## 2023-01-01 RX ORDER — CHOLECALCIFEROL (VITAMIN D3) 25 MCG
2000 TABLET ORAL DAILY
Status: DISCONTINUED | OUTPATIENT
Start: 2023-01-01 | End: 2023-01-01 | Stop reason: HOSPADM

## 2023-01-01 RX ORDER — INSULIN ASPART 100 [IU]/ML
5 INJECTION, SOLUTION INTRAVENOUS; SUBCUTANEOUS
Status: DISCONTINUED | OUTPATIENT
Start: 2023-01-01 | End: 2023-01-01

## 2023-01-01 RX ORDER — SEVELAMER CARBONATE 800 MG/1
1600 TABLET, FILM COATED ORAL
COMMUNITY
End: 2023-01-01 | Stop reason: ALTCHOICE

## 2023-01-01 RX ORDER — RAMELTEON 8 MG/1
8 TABLET ORAL NIGHTLY
Qty: 30 TABLET | Refills: 0 | Status: SHIPPED | OUTPATIENT
Start: 2023-01-01 | End: 2023-11-10

## 2023-01-01 RX ORDER — TACROLIMUS 1 MG/1
2 CAPSULE ORAL EVERY MORNING
Status: DISCONTINUED | OUTPATIENT
Start: 2023-01-01 | End: 2023-01-01 | Stop reason: HOSPADM

## 2023-01-01 RX ORDER — TACROLIMUS 0.5 MG/1
1.5 CAPSULE ORAL EVERY 12 HOURS
Qty: 180 CAPSULE | Refills: 11 | Status: SHIPPED | OUTPATIENT
Start: 2023-01-01 | End: 2023-01-01 | Stop reason: DRUGHIGH

## 2023-01-01 RX ORDER — TACROLIMUS 0.5 MG/1
CAPSULE ORAL
Qty: 210 CAPSULE | Refills: 11 | Status: ON HOLD | OUTPATIENT
Start: 2023-01-01 | End: 2023-01-01 | Stop reason: SDUPTHER

## 2023-01-01 RX ORDER — EVEROLIMUS 0.75 MG/1
0.75 TABLET ORAL EVERY 12 HOURS
Qty: 60 TABLET | Refills: 11 | Status: SHIPPED | OUTPATIENT
Start: 2023-01-01 | End: 2023-01-01

## 2023-01-01 RX ORDER — METOPROLOL TARTRATE 25 MG/1
12.5 TABLET, FILM COATED ORAL 2 TIMES DAILY
Qty: 30 TABLET | Refills: 11 | Status: SHIPPED | OUTPATIENT
Start: 2023-01-01 | End: 2024-08-08

## 2023-01-01 RX ORDER — AMLODIPINE BESYLATE 10 MG/1
10 TABLET ORAL DAILY
Status: DISCONTINUED | OUTPATIENT
Start: 2023-01-01 | End: 2023-01-01

## 2023-01-01 RX ORDER — AZITHROMYCIN 250 MG/1
250 TABLET, FILM COATED ORAL
Status: DISCONTINUED | OUTPATIENT
Start: 2023-01-01 | End: 2023-01-01

## 2023-01-01 RX ORDER — IBUPROFEN 200 MG
24 TABLET ORAL
Status: DISCONTINUED | OUTPATIENT
Start: 2023-01-01 | End: 2023-01-01 | Stop reason: HOSPADM

## 2023-01-01 RX ORDER — KETAMINE HCL IN 0.9 % NACL 50 MG/5 ML
SYRINGE (ML) INTRAVENOUS
Status: DISCONTINUED | OUTPATIENT
Start: 2023-01-01 | End: 2023-01-01

## 2023-01-01 RX ORDER — FOLIC ACID 1 MG/1
1000 TABLET ORAL DAILY
Status: DISCONTINUED | OUTPATIENT
Start: 2023-01-01 | End: 2023-01-01 | Stop reason: HOSPADM

## 2023-01-01 RX ORDER — CARVEDILOL 3.12 MG/1
TABLET ORAL
Status: ON HOLD | COMMUNITY
Start: 2023-01-01 | End: 2023-01-01 | Stop reason: HOSPADM

## 2023-01-01 RX ORDER — TACROLIMUS 0.5 MG/1
2 CAPSULE ORAL EVERY 12 HOURS
Qty: 240 CAPSULE | Refills: 11 | Status: SHIPPED | OUTPATIENT
Start: 2023-01-01

## 2023-01-01 RX ORDER — HEPARIN SODIUM 5000 [USP'U]/ML
5000 INJECTION, SOLUTION INTRAVENOUS; SUBCUTANEOUS EVERY 8 HOURS
Status: DISCONTINUED | OUTPATIENT
Start: 2023-01-01 | End: 2023-01-01

## 2023-01-01 RX ORDER — SODIUM CHLORIDE 0.9 % (FLUSH) 0.9 %
10 SYRINGE (ML) INJECTION
Status: DISCONTINUED | OUTPATIENT
Start: 2023-01-01 | End: 2023-01-01 | Stop reason: HOSPADM

## 2023-01-01 RX ORDER — METOPROLOL TARTRATE 25 MG/1
25 TABLET, FILM COATED ORAL 2 TIMES DAILY
Status: DISCONTINUED | OUTPATIENT
Start: 2023-01-01 | End: 2023-01-01 | Stop reason: HOSPADM

## 2023-01-01 RX ORDER — TACROLIMUS 1 MG/1
2 CAPSULE ORAL EVERY EVENING
Status: DISCONTINUED | OUTPATIENT
Start: 2023-01-01 | End: 2023-01-01 | Stop reason: HOSPADM

## 2023-01-01 RX ORDER — PANTOPRAZOLE SODIUM 40 MG/1
40 TABLET, DELAYED RELEASE ORAL DAILY
Qty: 30 TABLET | Refills: 11 | Status: SHIPPED | OUTPATIENT
Start: 2023-01-01 | End: 2024-01-25

## 2023-01-01 RX ORDER — ONDANSETRON 8 MG/1
8 TABLET, ORALLY DISINTEGRATING ORAL EVERY 8 HOURS PRN
Status: DISCONTINUED | OUTPATIENT
Start: 2023-01-01 | End: 2023-01-01 | Stop reason: HOSPADM

## 2023-01-01 RX ORDER — SULFAMETHOXAZOLE AND TRIMETHOPRIM 400; 80 MG/1; MG/1
1 TABLET ORAL
Status: DISCONTINUED | OUTPATIENT
Start: 2023-01-01 | End: 2023-01-01 | Stop reason: HOSPADM

## 2023-01-01 RX ORDER — FUROSEMIDE 40 MG/1
TABLET ORAL
Qty: 90 TABLET | Refills: 1 | Status: ON HOLD | OUTPATIENT
Start: 2023-01-01 | End: 2023-01-01 | Stop reason: HOSPADM

## 2023-01-01 RX ORDER — PREDNISONE 2.5 MG/1
5 TABLET ORAL DAILY
Status: DISCONTINUED | OUTPATIENT
Start: 2023-01-01 | End: 2023-01-01

## 2023-01-01 RX ORDER — ROSUVASTATIN CALCIUM 10 MG/1
10 TABLET, COATED ORAL DAILY
Qty: 30 TABLET | Refills: 3 | Status: SHIPPED | OUTPATIENT
Start: 2023-01-01 | End: 2023-01-01 | Stop reason: SDUPTHER

## 2023-01-01 RX ORDER — EVEROLIMUS 0.75 MG/1
TABLET ORAL
Qty: 90 TABLET | Refills: 11 | Status: ON HOLD | OUTPATIENT
Start: 2023-01-01 | End: 2023-01-01 | Stop reason: HOSPADM

## 2023-01-01 RX ORDER — HEPARIN SODIUM 1000 [USP'U]/ML
INJECTION, SOLUTION INTRAVENOUS; SUBCUTANEOUS
Status: DISCONTINUED | OUTPATIENT
Start: 2023-01-01 | End: 2023-01-01 | Stop reason: HOSPADM

## 2023-01-01 RX ORDER — FERROUS SULFATE, DRIED 160(50) MG
1 TABLET, EXTENDED RELEASE ORAL 2 TIMES DAILY
Status: DISCONTINUED | OUTPATIENT
Start: 2023-01-01 | End: 2023-01-01 | Stop reason: HOSPADM

## 2023-01-01 RX ORDER — ONDANSETRON 2 MG/ML
4 INJECTION INTRAMUSCULAR; INTRAVENOUS DAILY PRN
Status: DISCONTINUED | OUTPATIENT
Start: 2023-01-01 | End: 2023-01-01 | Stop reason: HOSPADM

## 2023-01-01 RX ORDER — SODIUM CHLORIDE 9 MG/ML
INJECTION, SOLUTION INTRAVENOUS CONTINUOUS
Status: DISCONTINUED | OUTPATIENT
Start: 2023-01-01 | End: 2023-01-01 | Stop reason: HOSPADM

## 2023-01-01 RX ORDER — AZELASTINE 1 MG/ML
1 SPRAY, METERED NASAL 2 TIMES DAILY
Qty: 50 ML | Refills: 3 | Status: SHIPPED | OUTPATIENT
Start: 2023-01-01 | End: 2024-09-19

## 2023-01-01 RX ORDER — LIDOCAINE HYDROCHLORIDE 10 MG/ML
1 INJECTION, SOLUTION EPIDURAL; INFILTRATION; INTRACAUDAL; PERINEURAL ONCE
Status: DISCONTINUED | OUTPATIENT
Start: 2023-01-01 | End: 2023-01-01 | Stop reason: HOSPADM

## 2023-01-01 RX ORDER — METHOTREXATE 2.5 MG/1
7.5 TABLET ORAL
COMMUNITY
End: 2023-01-01

## 2023-01-01 RX ADMIN — CINACALCET 30 MG: 30 TABLET, FILM COATED ORAL at 09:08

## 2023-01-01 RX ADMIN — INSULIN ASPART 2 UNITS: 100 INJECTION, SOLUTION INTRAVENOUS; SUBCUTANEOUS at 11:07

## 2023-01-01 RX ADMIN — ATORVASTATIN CALCIUM 20 MG: 20 TABLET, FILM COATED ORAL at 08:07

## 2023-01-01 RX ADMIN — Medication 10 MG: at 09:01

## 2023-01-01 RX ADMIN — HEPARIN SODIUM 5000 UNITS: 5000 INJECTION INTRAVENOUS; SUBCUTANEOUS at 06:07

## 2023-01-01 RX ADMIN — HEPARIN SODIUM 5000 UNITS: 5000 INJECTION INTRAVENOUS; SUBCUTANEOUS at 02:07

## 2023-01-01 RX ADMIN — CHOLECALCIFEROL TAB 25 MCG (1000 UNIT) 2000 UNITS: 25 TAB at 08:07

## 2023-01-01 RX ADMIN — INSULIN ASPART 4 UNITS: 100 INJECTION, SOLUTION INTRAVENOUS; SUBCUTANEOUS at 12:07

## 2023-01-01 RX ADMIN — INSULIN ASPART 4 UNITS: 100 INJECTION, SOLUTION INTRAVENOUS; SUBCUTANEOUS at 04:08

## 2023-01-01 RX ADMIN — TACROLIMUS 1.5 MG: 1 CAPSULE ORAL at 05:07

## 2023-01-01 RX ADMIN — ATORVASTATIN CALCIUM 20 MG: 20 TABLET, FILM COATED ORAL at 09:08

## 2023-01-01 RX ADMIN — Medication 1 TABLET: at 08:07

## 2023-01-01 RX ADMIN — CHOLECALCIFEROL TAB 25 MCG (1000 UNIT) 2000 UNITS: 25 TAB at 12:08

## 2023-01-01 RX ADMIN — Medication 1 TABLET: at 09:08

## 2023-01-01 RX ADMIN — TACROLIMUS 2 MG: 1 CAPSULE ORAL at 08:07

## 2023-01-01 RX ADMIN — INSULIN ASPART 4 UNITS: 100 INJECTION, SOLUTION INTRAVENOUS; SUBCUTANEOUS at 09:08

## 2023-01-01 RX ADMIN — MAGNESIUM 64 MG (MAGNESIUM CHLORIDE) TABLET,DELAYED RELEASE 128 MG: at 08:08

## 2023-01-01 RX ADMIN — INSULIN ASPART 4 UNITS: 100 INJECTION, SOLUTION INTRAVENOUS; SUBCUTANEOUS at 05:08

## 2023-01-01 RX ADMIN — HEPARIN SODIUM 5000 UNITS: 5000 INJECTION INTRAVENOUS; SUBCUTANEOUS at 06:08

## 2023-01-01 RX ADMIN — INSULIN ASPART 4 UNITS: 100 INJECTION, SOLUTION INTRAVENOUS; SUBCUTANEOUS at 08:08

## 2023-01-01 RX ADMIN — FOLIC ACID 1000 MCG: 1 TABLET ORAL at 09:08

## 2023-01-01 RX ADMIN — PREDNISONE 5 MG: 2.5 TABLET ORAL at 09:08

## 2023-01-01 RX ADMIN — INSULIN ASPART 4 UNITS: 100 INJECTION, SOLUTION INTRAVENOUS; SUBCUTANEOUS at 12:08

## 2023-01-01 RX ADMIN — FOLIC ACID 1000 MCG: 1 TABLET ORAL at 08:07

## 2023-01-01 RX ADMIN — SULFAMETHOXAZOLE AND TRIMETHOPRIM 1 TABLET: 400; 80 TABLET ORAL at 05:07

## 2023-01-01 RX ADMIN — Medication 10 MG: at 10:01

## 2023-01-01 RX ADMIN — MAGNESIUM 64 MG (MAGNESIUM CHLORIDE) TABLET,DELAYED RELEASE 128 MG: at 08:07

## 2023-01-01 RX ADMIN — INSULIN ASPART 4 UNITS: 100 INJECTION, SOLUTION INTRAVENOUS; SUBCUTANEOUS at 05:07

## 2023-01-01 RX ADMIN — EVEROLIMUS 1.5 MG: 0.75 TABLET ORAL at 09:07

## 2023-01-01 RX ADMIN — INSULIN ASPART 5 UNITS: 100 INJECTION, SOLUTION INTRAVENOUS; SUBCUTANEOUS at 09:07

## 2023-01-01 RX ADMIN — TACROLIMUS 2 MG: 1 CAPSULE ORAL at 06:08

## 2023-01-01 RX ADMIN — PREDNISONE 5 MG: 2.5 TABLET ORAL at 11:08

## 2023-01-01 RX ADMIN — HEPARIN SODIUM 5000 UNITS: 5000 INJECTION INTRAVENOUS; SUBCUTANEOUS at 02:08

## 2023-01-01 RX ADMIN — ERYTHROPOIETIN 2000 UNITS: 2000 INJECTION, SOLUTION INTRAVENOUS; SUBCUTANEOUS at 10:08

## 2023-01-01 RX ADMIN — INSULIN ASPART 6 UNITS: 100 INJECTION, SOLUTION INTRAVENOUS; SUBCUTANEOUS at 05:08

## 2023-01-01 RX ADMIN — HEPARIN SODIUM 5000 UNITS: 5000 INJECTION INTRAVENOUS; SUBCUTANEOUS at 09:07

## 2023-01-01 RX ADMIN — FOLIC ACID 1000 MCG: 1 TABLET ORAL at 12:08

## 2023-01-01 RX ADMIN — CHOLECALCIFEROL TAB 25 MCG (1000 UNIT) 2000 UNITS: 25 TAB at 09:08

## 2023-01-01 RX ADMIN — Medication 1 TABLET: at 09:07

## 2023-01-01 RX ADMIN — TACROLIMUS 1.5 MG: 1 CAPSULE ORAL at 06:07

## 2023-01-01 RX ADMIN — FAMOTIDINE 20 MG: 20 TABLET, FILM COATED ORAL at 08:07

## 2023-01-01 RX ADMIN — MAGNESIUM 64 MG (MAGNESIUM CHLORIDE) TABLET,DELAYED RELEASE 128 MG: at 11:08

## 2023-01-01 RX ADMIN — INSULIN DETEMIR 12 UNITS: 100 INJECTION, SOLUTION SUBCUTANEOUS at 08:07

## 2023-01-01 RX ADMIN — INSULIN ASPART 3 UNITS: 100 INJECTION, SOLUTION INTRAVENOUS; SUBCUTANEOUS at 08:07

## 2023-01-01 RX ADMIN — HEPARIN SODIUM 5000 UNITS: 5000 INJECTION INTRAVENOUS; SUBCUTANEOUS at 01:07

## 2023-01-01 RX ADMIN — HEPARIN SODIUM 5000 UNITS: 5000 INJECTION INTRAVENOUS; SUBCUTANEOUS at 08:08

## 2023-01-01 RX ADMIN — HEPARIN SODIUM 5000 UNITS: 5000 INJECTION INTRAVENOUS; SUBCUTANEOUS at 05:07

## 2023-01-01 RX ADMIN — Medication 1 TABLET: at 12:08

## 2023-01-01 RX ADMIN — EVEROLIMUS 0.75 MG: 0.75 TABLET ORAL at 09:07

## 2023-01-01 RX ADMIN — TACROLIMUS 2 MG: 1 CAPSULE ORAL at 05:08

## 2023-01-01 RX ADMIN — INSULIN ASPART 4 UNITS: 100 INJECTION, SOLUTION INTRAVENOUS; SUBCUTANEOUS at 08:07

## 2023-01-01 RX ADMIN — MAGNESIUM 64 MG (MAGNESIUM CHLORIDE) TABLET,DELAYED RELEASE 128 MG: at 09:08

## 2023-01-01 RX ADMIN — EVEROLIMUS 0.75 MG: 0.75 TABLET ORAL at 08:07

## 2023-01-01 RX ADMIN — SULFAMETHOXAZOLE AND TRIMETHOPRIM 1 TABLET: 400; 80 TABLET ORAL at 05:08

## 2023-01-01 RX ADMIN — Medication 1 TABLET: at 11:08

## 2023-01-01 RX ADMIN — INSULIN ASPART 2 UNITS: 100 INJECTION, SOLUTION INTRAVENOUS; SUBCUTANEOUS at 09:07

## 2023-01-01 RX ADMIN — METHYLPREDNISOLONE SODIUM SUCCINATE 125 MG: 125 INJECTION, POWDER, FOR SOLUTION INTRAMUSCULAR; INTRAVENOUS at 12:07

## 2023-01-01 RX ADMIN — CINACALCET 30 MG: 30 TABLET, FILM COATED ORAL at 08:08

## 2023-01-01 RX ADMIN — AZITHROMYCIN MONOHYDRATE 500 MG: 500 INJECTION, POWDER, LYOPHILIZED, FOR SOLUTION INTRAVENOUS at 08:07

## 2023-01-01 RX ADMIN — PREDNISONE 5 MG: 2.5 TABLET ORAL at 06:08

## 2023-01-01 RX ADMIN — CEFEPIME 1 G: 1 INJECTION, POWDER, FOR SOLUTION INTRAMUSCULAR; INTRAVENOUS at 06:07

## 2023-01-01 RX ADMIN — INSULIN ASPART 2 UNITS: 100 INJECTION, SOLUTION INTRAVENOUS; SUBCUTANEOUS at 04:08

## 2023-01-01 RX ADMIN — METHYLPREDNISOLONE SODIUM SUCCINATE 125 MG: 125 INJECTION, POWDER, FOR SOLUTION INTRAMUSCULAR; INTRAVENOUS at 06:07

## 2023-01-01 RX ADMIN — INSULIN ASPART 2 UNITS: 100 INJECTION, SOLUTION INTRAVENOUS; SUBCUTANEOUS at 09:08

## 2023-01-01 RX ADMIN — INSULIN ASPART 2 UNITS: 100 INJECTION, SOLUTION INTRAVENOUS; SUBCUTANEOUS at 10:08

## 2023-01-01 RX ADMIN — ATORVASTATIN CALCIUM 20 MG: 20 TABLET, FILM COATED ORAL at 11:08

## 2023-01-01 RX ADMIN — ASPIRIN 81 MG: 81 TABLET, COATED ORAL at 12:08

## 2023-01-01 RX ADMIN — ATORVASTATIN CALCIUM 20 MG: 20 TABLET, FILM COATED ORAL at 08:08

## 2023-01-01 RX ADMIN — MAGNESIUM 64 MG (MAGNESIUM CHLORIDE) TABLET,DELAYED RELEASE 128 MG: at 09:07

## 2023-01-01 RX ADMIN — TACROLIMUS 2 MG: 1 CAPSULE ORAL at 09:08

## 2023-01-01 RX ADMIN — INSULIN ASPART 2 UNITS: 100 INJECTION, SOLUTION INTRAVENOUS; SUBCUTANEOUS at 05:07

## 2023-01-01 RX ADMIN — ASPIRIN 81 MG: 81 TABLET, COATED ORAL at 09:08

## 2023-01-01 RX ADMIN — ASPIRIN 81 MG: 81 TABLET, COATED ORAL at 08:07

## 2023-01-01 RX ADMIN — INSULIN ASPART 2 UNITS: 100 INJECTION, SOLUTION INTRAVENOUS; SUBCUTANEOUS at 12:07

## 2023-01-01 RX ADMIN — DEXMEDETOMIDINE HYDROCHLORIDE 12 MCG: 100 INJECTION, SOLUTION INTRAVENOUS at 10:01

## 2023-01-01 RX ADMIN — INSULIN ASPART 4 UNITS: 100 INJECTION, SOLUTION INTRAVENOUS; SUBCUTANEOUS at 07:08

## 2023-01-01 RX ADMIN — PHENYLEPHRINE HYDROCHLORIDE 100 MCG: 10 INJECTION INTRAVENOUS at 10:01

## 2023-01-01 RX ADMIN — DEXMEDETOMIDINE HYDROCHLORIDE 0.8 MCG/KG/HR: 4 INJECTION INTRAVENOUS at 09:07

## 2023-01-01 RX ADMIN — DEXMEDETOMIDINE HYDROCHLORIDE 0.8 MCG/KG/HR: 4 INJECTION INTRAVENOUS at 01:07

## 2023-01-01 RX ADMIN — ASPIRIN 81 MG: 81 TABLET, COATED ORAL at 11:08

## 2023-01-01 RX ADMIN — CINACALCET 30 MG: 30 TABLET, FILM COATED ORAL at 08:07

## 2023-01-01 RX ADMIN — FAMOTIDINE 20 MG: 20 TABLET, FILM COATED ORAL at 06:08

## 2023-01-01 RX ADMIN — DEXMEDETOMIDINE HYDROCHLORIDE 0.5 MCG/KG/HR: 4 INJECTION INTRAVENOUS at 10:07

## 2023-01-01 RX ADMIN — HEPARIN SODIUM 5000 UNITS: 5000 INJECTION INTRAVENOUS; SUBCUTANEOUS at 03:08

## 2023-01-01 RX ADMIN — Medication 1 TABLET: at 08:08

## 2023-01-01 RX ADMIN — MEROPENEM 500 MG: 500 INJECTION INTRAVENOUS at 10:07

## 2023-01-01 RX ADMIN — FAMOTIDINE 20 MG: 20 TABLET, FILM COATED ORAL at 09:08

## 2023-01-01 RX ADMIN — SODIUM CHLORIDE: 9 INJECTION, SOLUTION INTRAVENOUS at 08:08

## 2023-01-01 RX ADMIN — TACROLIMUS 1.5 MG: 1 CAPSULE ORAL at 05:08

## 2023-01-01 RX ADMIN — ATORVASTATIN CALCIUM 20 MG: 20 TABLET, FILM COATED ORAL at 06:08

## 2023-01-01 RX ADMIN — INSULIN ASPART 4 UNITS: 100 INJECTION, SOLUTION INTRAVENOUS; SUBCUTANEOUS at 09:07

## 2023-01-01 RX ADMIN — PREDNISONE 5 MG: 2.5 TABLET ORAL at 08:07

## 2023-01-01 RX ADMIN — PREDNISONE 5 MG: 2.5 TABLET ORAL at 05:08

## 2023-01-01 RX ADMIN — SODIUM CHLORIDE: 9 INJECTION, SOLUTION INTRAVENOUS at 02:07

## 2023-01-01 RX ADMIN — FOLIC ACID 1000 MCG: 1 TABLET ORAL at 08:08

## 2023-01-01 RX ADMIN — EVEROLIMUS 1.5 MG: 0.75 TABLET ORAL at 05:08

## 2023-01-01 RX ADMIN — EVEROLIMUS 1.5 MG: 0.75 TABLET ORAL at 08:07

## 2023-01-01 RX ADMIN — ASPIRIN 81 MG: 81 TABLET, COATED ORAL at 08:08

## 2023-01-01 RX ADMIN — SODIUM CHLORIDE: 9 INJECTION, SOLUTION INTRAVENOUS at 07:08

## 2023-01-01 RX ADMIN — INSULIN ASPART 2 UNITS: 100 INJECTION, SOLUTION INTRAVENOUS; SUBCUTANEOUS at 08:07

## 2023-01-01 RX ADMIN — METHYLPREDNISOLONE SODIUM SUCCINATE 125 MG: 125 INJECTION, POWDER, FOR SOLUTION INTRAMUSCULAR; INTRAVENOUS at 05:07

## 2023-01-01 RX ADMIN — INSULIN ASPART 4 UNITS: 100 INJECTION, SOLUTION INTRAVENOUS; SUBCUTANEOUS at 06:07

## 2023-01-01 RX ADMIN — CEFEPIME 1 G: 1 INJECTION, POWDER, FOR SOLUTION INTRAMUSCULAR; INTRAVENOUS at 05:07

## 2023-01-01 RX ADMIN — SODIUM CHLORIDE: 9 INJECTION, SOLUTION INTRAVENOUS at 09:01

## 2023-01-01 RX ADMIN — METHYLPREDNISOLONE SODIUM SUCCINATE 125 MG: 125 INJECTION, POWDER, FOR SOLUTION INTRAMUSCULAR; INTRAVENOUS at 11:07

## 2023-01-01 RX ADMIN — CINACALCET 30 MG: 30 TABLET, FILM COATED ORAL at 12:08

## 2023-01-01 RX ADMIN — INSULIN DETEMIR 12 UNITS: 100 INJECTION, SOLUTION SUBCUTANEOUS at 09:07

## 2023-01-01 RX ADMIN — PREDNISONE 5 MG: 2.5 TABLET ORAL at 08:08

## 2023-01-01 RX ADMIN — ERYTHROPOIETIN 2000 UNITS: 2000 INJECTION, SOLUTION INTRAVENOUS; SUBCUTANEOUS at 11:08

## 2023-01-01 RX ADMIN — INSULIN ASPART 2 UNITS: 100 INJECTION, SOLUTION INTRAVENOUS; SUBCUTANEOUS at 12:08

## 2023-01-01 RX ADMIN — DEXMEDETOMIDINE HYDROCHLORIDE 0.7 MCG/KG/HR: 4 INJECTION INTRAVENOUS at 06:07

## 2023-01-01 RX ADMIN — MAGNESIUM 64 MG (MAGNESIUM CHLORIDE) TABLET,DELAYED RELEASE 128 MG: at 12:08

## 2023-01-01 RX ADMIN — CHOLECALCIFEROL TAB 25 MCG (1000 UNIT) 2000 UNITS: 25 TAB at 11:08

## 2023-01-01 RX ADMIN — FAMOTIDINE 20 MG: 20 TABLET, FILM COATED ORAL at 11:08

## 2023-01-01 RX ADMIN — INSULIN ASPART 2 UNITS: 100 INJECTION, SOLUTION INTRAVENOUS; SUBCUTANEOUS at 08:08

## 2023-01-01 RX ADMIN — INSULIN ASPART 4 UNITS: 100 INJECTION, SOLUTION INTRAVENOUS; SUBCUTANEOUS at 01:08

## 2023-01-01 RX ADMIN — EVEROLIMUS 0.75 MG: 0.75 TABLET ORAL at 05:07

## 2023-01-01 RX ADMIN — HEPARIN SODIUM 5000 UNITS: 5000 INJECTION INTRAVENOUS; SUBCUTANEOUS at 09:08

## 2023-01-01 RX ADMIN — EVEROLIMUS 1.5 MG: 0.75 TABLET ORAL at 10:07

## 2023-01-01 RX ADMIN — PIPERACILLIN SODIUM AND TAZOBACTAM SODIUM 4.5 G: 4; .5 INJECTION, POWDER, LYOPHILIZED, FOR SOLUTION INTRAVENOUS at 06:07

## 2023-01-01 RX ADMIN — VANCOMYCIN HYDROCHLORIDE 500 MG: 500 INJECTION, POWDER, LYOPHILIZED, FOR SOLUTION INTRAVENOUS at 02:07

## 2023-01-01 RX ADMIN — CHOLECALCIFEROL TAB 25 MCG (1000 UNIT) 2000 UNITS: 25 TAB at 09:07

## 2023-01-01 RX ADMIN — INSULIN ASPART 1 UNITS: 100 INJECTION, SOLUTION INTRAVENOUS; SUBCUTANEOUS at 09:07

## 2023-01-01 RX ADMIN — LIDOCAINE HYDROCHLORIDE 60 MG: 20 INJECTION INTRAVENOUS at 09:01

## 2023-01-01 RX ADMIN — ACETAMINOPHEN 1000 MG: 500 TABLET ORAL at 07:01

## 2023-01-01 RX ADMIN — PREDNISONE 5 MG: 2.5 TABLET ORAL at 02:07

## 2023-01-01 RX ADMIN — SODIUM CHLORIDE: 9 INJECTION, SOLUTION INTRAVENOUS at 07:01

## 2023-01-01 RX ADMIN — HEPARIN SODIUM 5000 UNITS: 5000 INJECTION INTRAVENOUS; SUBCUTANEOUS at 05:08

## 2023-01-01 RX ADMIN — ERYTHROPOIETIN 2000 UNITS: 2000 INJECTION, SOLUTION INTRAVENOUS; SUBCUTANEOUS at 09:08

## 2023-01-01 RX ADMIN — DEXMEDETOMIDINE HYDROCHLORIDE 12 MCG: 100 INJECTION, SOLUTION INTRAVENOUS at 09:01

## 2023-01-01 RX ADMIN — METOPROLOL TARTRATE 25 MG: 25 TABLET, FILM COATED ORAL at 02:08

## 2023-01-01 RX ADMIN — TACROLIMUS 1.5 MG: 1 CAPSULE ORAL at 04:08

## 2023-01-01 RX ADMIN — DEXMEDETOMIDINE HYDROCHLORIDE 0.8 MCG/KG/HR: 4 INJECTION INTRAVENOUS at 12:07

## 2023-01-01 RX ADMIN — SODIUM CHLORIDE, POTASSIUM CHLORIDE, SODIUM LACTATE AND CALCIUM CHLORIDE 250 ML: 600; 310; 30; 20 INJECTION, SOLUTION INTRAVENOUS at 07:07

## 2023-01-01 RX ADMIN — GLYCOPYRROLATE 0.2 MG: 0.2 INJECTION, SOLUTION INTRAMUSCULAR; INTRAVENOUS at 10:01

## 2023-01-01 RX ADMIN — ATORVASTATIN CALCIUM 20 MG: 20 TABLET, FILM COATED ORAL at 12:08

## 2023-01-01 RX ADMIN — CINACALCET 30 MG: 30 TABLET, FILM COATED ORAL at 11:08

## 2023-01-01 RX ADMIN — INSULIN ASPART 4 UNITS: 100 INJECTION, SOLUTION INTRAVENOUS; SUBCUTANEOUS at 11:07

## 2023-01-01 RX ADMIN — TACROLIMUS 2 MG: 1 CAPSULE ORAL at 08:08

## 2023-01-01 RX ADMIN — ACETAMINOPHEN 1000 MG: 500 TABLET ORAL at 06:07

## 2023-01-01 RX ADMIN — MEROPENEM 500 MG: 500 INJECTION INTRAVENOUS at 09:07

## 2023-01-01 RX ADMIN — INSULIN ASPART 6 UNITS: 100 INJECTION, SOLUTION INTRAVENOUS; SUBCUTANEOUS at 05:07

## 2023-01-01 RX ADMIN — INSULIN ASPART 8 UNITS: 100 INJECTION, SOLUTION INTRAVENOUS; SUBCUTANEOUS at 05:08

## 2023-01-01 RX ADMIN — CHOLECALCIFEROL TAB 25 MCG (1000 UNIT) 2000 UNITS: 25 TAB at 08:08

## 2023-01-01 RX ADMIN — VANCOMYCIN HYDROCHLORIDE 1000 MG: 1 INJECTION, POWDER, LYOPHILIZED, FOR SOLUTION INTRAVENOUS at 11:07

## 2023-01-01 RX ADMIN — SULFAMETHOXAZOLE AND TRIMETHOPRIM 1 TABLET: 400; 80 TABLET ORAL at 06:07

## 2023-01-01 RX ADMIN — FAMOTIDINE 20 MG: 20 TABLET, FILM COATED ORAL at 12:08

## 2023-01-01 RX ADMIN — PHENYLEPHRINE HYDROCHLORIDE 50 MCG: 10 INJECTION INTRAVENOUS at 10:01

## 2023-01-01 RX ADMIN — HEPARIN SODIUM 5000 UNITS: 5000 INJECTION INTRAVENOUS; SUBCUTANEOUS at 08:07

## 2023-01-01 RX ADMIN — ERYTHROPOIETIN 2000 UNITS: 2000 INJECTION, SOLUTION INTRAVENOUS; SUBCUTANEOUS at 05:07

## 2023-01-01 RX ADMIN — TACROLIMUS 2 MG: 1 CAPSULE ORAL at 07:08

## 2023-01-01 RX ADMIN — INSULIN ASPART 1 UNITS: 100 INJECTION, SOLUTION INTRAVENOUS; SUBCUTANEOUS at 08:07

## 2023-01-01 RX ADMIN — FOLIC ACID 1000 MCG: 1 TABLET ORAL at 11:08

## 2023-01-01 RX ADMIN — TACROLIMUS 1.5 MG: 1 CAPSULE ORAL at 06:08

## 2023-01-01 RX ADMIN — FAMOTIDINE 20 MG: 20 TABLET, FILM COATED ORAL at 08:08

## 2023-01-09 NOTE — TELEPHONE ENCOUNTER
Discussed with Dr. Mcdonald and Thony Lyles.  Nacho 600 mg Vitamin d 200 mg.  ----- Message from Robina Mcdonald MD sent at 1/9/2023 11:32 AM CST -----  Otc Calcium/Vit D recommended (500mg/500iU) daily

## 2023-01-09 NOTE — PROGRESS NOTES
LUNG TRANSPLANT RECIPIENT FOLLOW-UP     Reason for Visit: Follow-up after lung transplantation.                               Date of Transplant: 8/22/17     Reason for Transplant: Sarcoidosis with pulmonary hypertension     Type of Transplant: bilateral sequential lung     CMV Status: D+ / R-      Major Complications:   1. Left vocal cord dysfunction   2. 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  A1- 1/2021  6.  Increasing DSA noted in 02/2021- s/p tx in 04/2021 with PLEX/IVIG/MP and ritux   7. 2/2022 COVID infection with residual functional decline                                                                               History of Present Illness: Martin Hendrix Jr. is a 60 y.o. year old male with the above stated history.  He is on 2L of oxygen. He is on no assisted ventilation although he has been diagnosed with KRISTYN and owns a CPAP.  His New York Heart Association Class is III and a Karnofsky score of 70% - Cares for self: Unable to carry on normal activity or active work.   He is diabetic insulin dependent.    Patient presents today for routine follow up. Remains at his baseline from a respiratory standpoint. He reports that he hasn't slept well the past week and feel tired.  Denies exacerbations/hospitalizations since his last clinic visit. Denies any infectious symptoms such as fever, chills, cough, or any other issues.  History of ESRD on HD TIW. Continues to have occasional rhinorrhea associated with changing seasons. He is compliant with his transplant medications and is tolerating them well.       Review of Systems   Constitutional: Negative for chills, diaphoresis, fever, malaise/fatigue and weight loss.   HENT: Negative for congestion, ear discharge, ear pain, hearing loss, nosebleeds, sinus pain, sore throat and tinnitus.    Eyes: Negative for  "blurred vision, double vision, photophobia, pain, discharge and redness.   Respiratory: Positive for shortness of breath (with exertion). Negative for cough, hemoptysis, sputum production, wheezing and stridor.    Cardiovascular: Negative for chest pain, palpitations, orthopnea, claudication, leg swelling and PND.   Gastrointestinal: Negative for abdominal pain, blood in stool, constipation, diarrhea, heartburn, melena, nausea and vomiting.   Genitourinary: Negative for dysuria, flank pain, frequency, hematuria and urgency.        Decreased urination since kidney failure  Musculoskeletal: Negative for back pain, falls, joint pain, myalgias and neck pain.   Skin: Negative for itching and rash.   Neurological: Negative for dizziness (notes occasional lightheadedness after HD), tingling, tremors, sensory change, speech change, focal weakness, seizures, loss of consciousness, weakness and headaches.   Endo/Heme/Allergies: Negative for environmental allergies and polydipsia. Does not bruise/bleed easily.   Psychiatric/Behavioral: Negative for depression, hallucinations, memory loss, substance abuse and suicidal ideas. The patient is not nervous/anxious.  Positive for insomnia.      BP (!) 198/104   Pulse 110   Temp 98.2 °F (36.8 °C) (Oral)   Resp 20   Ht 5' 10" (1.778 m)   Wt 91 kg (200 lb 9.9 oz)   SpO2 95% Comment: 2 liters  BMI 28.79 kg/m²     Physical Exam  Vitals and nursing note reviewed.   Constitutional:       General: He is not in acute distress.     Appearance: He is not ill-appearing or diaphoretic.   HENT:      Head: Normocephalic and atraumatic.      Nose: Nose normal.      Mouth/Throat:      Pharynx: No oropharyngeal exudate.   Eyes:      General: No scleral icterus.     Extraocular Movements: Extraocular movements intact.      Conjunctiva/sclera: Conjunctivae normal.   Neck:      Thyroid: No thyromegaly.      Vascular: No JVD.      Trachea: No tracheal deviation.   Cardiovascular:      Rate and " Rhythm: Normal rate and regular rhythm.      Heart sounds: Murmur (2/6 systolic murmur best heard at LUSB) heard.   Pulmonary:      Effort: Pulmonary effort is normal. No respiratory distress.      Breath sounds: No stridor.  No wheezing or rhonchi. Mild rales at the bases.  Chest:      Chest wall: No tenderness.   Abdominal:      General: Bowel sounds are normal. There is no distension.      Palpations: Abdomen is soft. There is no mass.      Tenderness: There is no abdominal tenderness. There is no guarding or rebound.   Musculoskeletal:         General: No tenderness or deformity. Normal range of motion.      Cervical back: Normal range of motion and neck supple.   Lymphadenopathy:      Cervical: No cervical adenopathy.   Skin:     General: Skin is warm and dry.      Coloration: Skin is not pale.      Findings: No erythema or rash.   Neurological:      Mental Status: He is alert and oriented to person, place, and time.      Cranial Nerves: No cranial nerve deficit.      Coordination: Coordination normal.      Gait: Gait is intact.   Psychiatric:         Mood and Affect: Mood and affect normal.         Cognition and Memory: Memory normal.         Judgment: Judgment normal.       Labs:  cbc, cmp, tacrolimus Latest Ref Rng & Units 10/5/2022 11/2/2022 1/9/2023   TACROLIMUS LVL 5.0 - 15.0 ng/mL - - -   WHITE BLOOD CELL COUNT 3.90 - 12.70 K/uL - - 9.35   RBC 4.60 - 6.20 M/uL - - 3.57(L)   HEMOGLOBIN 14.0 - 18.0 g/dL - - 11.0(L)   HEMATOCRIT 40.0 - 54.0 % - - 35.7(L)   MCV 82 - 98 fL - - 100(H)   MCH 27.0 - 31.0 pg - - 30.8   MCHC 32.0 - 36.0 g/dL - - 30.8(L)   RDW 11.5 - 14.5 % - - 13.4   PLATELETS 150 - 450 K/uL - - 295   MPV 9.2 - 12.9 fL - - 8.9(L)   GRAN # 1.8 - 7.7 K/uL - - 5.9   LYMPH # 1.0 - 4.8 K/uL - - 1.6   MONO # 0.3 - 1.0 K/uL - - 1.0   EOSINOPHIL% 0.0 - 8.0 % - - 7.5   BASOPHIL% 0.0 - 1.9 % - - 1.2   DIFFERENTIAL METHOD - - - Automated   SODIUM 136 - 145 mmol/L 137 - 137   POTASSIUM 3.5 - 5.1 mmol/L 4.4  4.1 4.4   CHLORIDE 95 - 110 mmol/L 93(L) - 94(L)   CO2 23 - 29 mmol/L 30(H) - 29   GLUCOSE 70 - 110 mg/dL 152(H) - 136(H)   BUN BLD 6 - 20 mg/dL 31(H) - 53(H)   CREATININE 0.5 - 1.4 mg/dL 10.3(H) - 12.7(H)   CALCIUM 8.7 - 10.5 mg/dL 9.4 - 9.7   PROTEIN TOTAL 6.0 - 8.4 g/dL 7.9 - 7.9   ALBUMIN 3.5 - 5.2 g/dL 4.0 - 3.9   BILIRUBIN TOTAL 0.1 - 1.0 mg/dL 0.5 - 0.6   ALK PHOS 55 - 135 U/L 98 - 102   AST 10 - 40 U/L 14 - 13   ALT 10 - 44 U/L 15 - 9(L)   ANION GAP 8 - 16 mmol/L 14 - 14   EGFR IF AFRICAN AMERICAN >60 mL/min/1.73 m:2 - - -   EGFR IF NON-AFRICAN AMERICAN >60 mL/min/1.73 m:2 - - -     Pulmonary Function Tests 1/9/2023 10/5/2022 7/6/2022 4/4/2022 3/7/2022 1/12/2022 12/8/2021   FVC 1.27 1.44 1.57 1.29 1.35 1.65 1.66   FEV1 0.89 1.04 1.03 0.85 0.84 1.06 1.09   TLC (liters) - - - - - - -   DLCO (ml/mmHg sec) - - - - - - -   FVC% 27.5 31 33.8 27.7 29 35.2 35.4   FEV1% 24.7 28.9 28.5 23.6 23.2 29.3 30.2   FEF 25-75 0.52 0.67 0.53 0.44 0.39 0.54 0.58   FEF 25-75% 17.3 22.4 17.4 14.6 12.8 17.6 19   TLC% - - - - - - -   RV - - - - - - -   RV% - - - - - - -   DLCO% - - - - - - -         Imaging:  Results for orders placed during the hospital encounter of 01/09/23    X-Ray Chest PA And Lateral    Narrative  EXAMINATION:  XR CHEST PA AND LATERAL    CLINICAL HISTORY:  BSLT 8/22/2017; Lung transplant status    TECHNIQUE:  PA and lateral views of the chest were performed.    COMPARISON:  Chest x-ray dated October 5, 2022    FINDINGS:  Patient is status post median sternotomy in this patient status post bilateral lung transplant.  The trachea and cardiomediastinal silhouette are within normal limits.  When compared to the prior study, there is no significant interval change.  Fibrotic changes with volume loss and pleural thickening are identified bilaterally.  Osseous structures demonstrate no evidence for acute fractures or dislocations.    Impression  No interval change      Electronically signed by: Sushil Youssef,  MD  Date:    01/09/2023  Time:    08:28      Assessment:  1. Lung replaced by transplant        2. Immunosuppression        3. Prophylactic antibiotic        4. ESRD (end stage renal disease)        5. Chronic respiratory failure with hypoxia        6. Type 2 diabetes mellitus with hyperglycemia, with long-term current use of insulin                 Plan:     1. FEV1 down from previous, but fluctuates.  He has been having some issues with insomnia and complains of fatigue as a result.  CXR without acute changes. Respiratory symptoms overall remain stable. Continue to monitor spirometry and imaging at routine visits. Patient can recheck spirometry in 4 weeks if he so chooses.      2. Continue tacrolimus 1.5 mg BID, everolimus 0.75 mg BID, prednisone 5 mg daily. Will review tacroimus level and adjust dose as needed. 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 Continue azithromycin MWF.     3. Continue bactrim SS.     4. Continue HD TThS. Continue epo per nephrology.    6. Continue diabetes regimen per endocrinology.     7. Continue oxygen supplementation with exertion.     8. RTC in 3 month or sooner if needed.       Irwin Celestin NP  Lung Transplant

## 2023-01-10 NOTE — TELEPHONE ENCOUNTER
Patient approves discussing his health status with his daughter.  Spoke with Sybil and explained that patient's PFT was decreased, but tends to fluctuate.  Has been having trouble sleeping and has been fatigued, which could be contributing to the lower reading.  We are repeating in 4 weeks.  Pt's evero and Tac levels were undetectable, but patient had missed several medication doses.  Reminded him to be consistent with dosing and that labs will be repeated next week.  She asked if we are monitoring his heart.  Explained that an Echo was done in August.  She states she would like to speak with the doctor to get a full idea of how he is doing as they are trying to decided if he should move to [place] to be near her.  She asked about transfer of care and if we would still be involved in his care if he was to transfer.  Explained that the center that he wishes to transfer to would have to accept him and at that point, we would no longer be involved in his care.  She verbalized understanding.  Message sent to Dr. Mcdonald to call Sybil to discuss.  ----- Message from Baltazar Correa sent at 1/10/2023  1:48 PM CST -----  Regarding: Speak to Staff  Contact: Sybil  Patient's daughter called in to speak with staff regarding his appointments. Daughter states she has a meeting with her kid's school for 2pm but shell be free after 3pm or so.          Contact: [phone]

## 2023-01-11 NOTE — PROGRESS NOTES
Patient's daughter requests Pulmonary Rehab referral again.  Approved per Dr. Mcdonald.  Order entered and routed to Roxanne Watters to forward to [place].

## 2023-01-12 NOTE — TELEPHONE ENCOUNTER
----- Message from Sybil Estrada RN sent at 1/11/2023  4:04 PM CST -----  Please send to [facility]

## 2023-01-12 NOTE — TELEPHONE ENCOUNTER
SW faxed pulmonary rehab orders/medical records to [facility] in [place], [place] (ph: [phone], fx: [phone]). SW confirmed orders were received via telephone. SW following and remains available.

## 2023-01-12 NOTE — TELEPHONE ENCOUNTER
----- Message from Oneal Decker RN sent at 1/12/2023 11:03 AM CST -----  Regarding: Handicap plates  Good morning,    I just spoke to Mr. Hendrix and he was wondering if there was specific paperwork that could be filled out for him for handicap plates. He states that he has been very short of breath and has a hard time walking from the car into a store. He was seen by pulmonology yesterday and he has been wearing 2L of oxygen continuously. Thank you for your help and assistance.    Kind regards,    Oneal Decker RN [facility]

## 2023-01-12 NOTE — PROGRESS NOTES
Outpatient Care Management  Plan of Care Follow Up Visit    Patient: Martin Hendrix Jr.  MRN: [id number]  Date of Service: 01/12/2023  Completed by: Oneal Decker RN  Referral Date: 09/14/2022    Reason for Visit   Patient presents with    OPCM Chart Review    Update Plan Of Care       Brief Summary: Phone contact made with Mr. Hendrix and we discussed his CKD care plan. He inquired about obtaining handicap license plates. I will reach out to Dr. Henley for this. Plan to follow up in three weeks.

## 2023-01-12 NOTE — TELEPHONE ENCOUNTER
Pt is askinf for handicap tag, however does he need an appt with you or he need to get it from the pulmonologist that he just saw for his concerns

## 2023-01-13 NOTE — TELEPHONE ENCOUNTER
Appointments scheduled and patient notified.  ----- Message from Robina Mcdonald MD sent at 1/12/2023  7:28 PM CST -----  Regarding: RE:  Can leave off the CXR.   Labs next week and with clinic visit.  ----- Message -----  From: Sybil Estrada RN  Sent: 1/12/2023   4:39 PM CST  To: Robina Mcdonald MD    Does he need an xray?  Checking labs next week since his troughs were not accurate.  ----- Message -----  From: Robina Mcdonald MD  Sent: 1/12/2023   4:03 PM CST  To: Sybil Estrada RN    PFTs and clinic visit in 2 weeks.

## 2023-01-17 NOTE — PROGRESS NOTES
VASCULAR SURGERY NOTE    Patient ID: Martin Hendrix Jr. is a 62 y.o. male.    I. HISTORY     Chief Complaint: AVF    HPI: Martin Hendrix Jr. is a 62 y.o. male who is here today for established patient appointment.  The patient has a left radiocephalic AV fistula in the distal forearm which was created by me on 10/13/2021.  He returned to the OR 11/18/21 for ligation of side branches because the fistula had not had adequate maturation at his follow up ultrasound. He then was found to have focal stenosis on ultrasound which taken to OR for fistulagram and PTA with resolution of stenosis on 1/10/22. He has been cannulating/dialyzing through his AVF since that time but recently has had high venous pressures. Denies prolonged bleeding from cannulation sites. No cannulation issues but does have alarms on HD. He returns today for surveillance u/s.      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 Peacock    DM (diabetes mellitus) 11/2017     02/22/2021 Insulin x 2017    Hypertension     On home oxygen therapy     KRISTYN on CPAP     Osteopenia     Pancreatitis 2009    hospitalized    Pulmonary hypertension     Pure hypercholesterolemia 11/15/2017    Sarcoidosis     Shortness of breath     Type 2 diabetes mellitus 11/5/2017        Past Surgical History:   Procedure Laterality Date    ABDOMINAL SURGERY      6 weeks old    AV FISTULA PLACEMENT Left 10/13/2021    Procedure: CREATION, AV FISTULA;  Surgeon: CARLI Thomason II, MD;  Location: [facility] OR [address] [place] [address];  Service: Vascular;  Laterality: Left;    BRONCHOSCOPY      BRONCHOSCOPY N/A 8/22/2018    Procedure: flexible bronchoscopy with tissue biopsy CPT 71574;  Surgeon: [facility] Diagnostic Provider;  Location: [facility] OR [address] [place] [address];  Service: Endoscopy;   Laterality: N/A;    BRONCHOSCOPY N/A 11/20/2018    Procedure: flexible bronchoscopy with tissue biopy CPT 57934;  Surgeon: [facility] Diagnostic Provider;  Location: [facility] OR 2ND FLR;  Service: Anesthesiology;  Laterality: N/A;    BRONCHOSCOPY N/A 11/10/2021    Procedure: Flexible bronchoscopy;  Surgeon: Samantha Bill DO;  Location: [facility] OR 2ND FLR;  Service: Endoscopy;  Laterality: N/A;    CHEST TUBE INSERTION      CHOLECYSTECTOMY      COLONOSCOPY      COLONOSCOPY N/A 11/2/2022    Procedure: COLONOSCOPY;  Surgeon: Haritha Hendrix DO;  Location: [facility] ENDO;  Service: General;  Laterality: N/A;    ESOPHAGOGASTRODUODENOSCOPY N/A 8/6/2018    Procedure: EGD (ESOPHAGOGASTRODUODENOSCOPY);  Surgeon: Ramu Eisenberg MD;  Location: [facility] (2ND FLR);  Service: Endoscopy;  Laterality: N/A;  off pepcid and all acid reducers for 2 weeks; PA > 50    FISTULOGRAM Left 1/10/2022    Procedure: Fistulogram;  Surgeon: CARLI Thomason II, MD;  Location: [facility] CATH LAB;  Service: Vascular;  Laterality: Left;    INSERTION OF TUNNELED CENTRAL VENOUS HEMODIALYSIS CATHETER N/A 3/13/2019    Procedure: INSERTION, CATHETER, CENTRAL VENOUS, HEMODIALYSIS, TUNNELED;  Surgeon: Edy Gonzalez MD;  Location: [facility] CATH LAB;  Service: Nephrology;  Laterality: N/A;    INSERTION OF TUNNELED CENTRAL VENOUS HEMODIALYSIS CATHETER Right 5/7/2021    Procedure: Insertion, Catheter, Central Venous, Hemodialysis;  Surgeon: Mary Joshi MD;  Location: [facility] CATH LAB;  Service: Interventional Nephrology;  Laterality: Right;    LUNG BIOPSY      LUNG TRANSPLANT  08/2017    PERCUTANEOUS TRANSLUMINAL ANGIOPLASTY OF ARTERIOVENOUS FISTULA N/A 1/10/2022    Procedure: PTA, AV FISTULA;  Surgeon: CARLI Thomason II, MD;  Location: [facility] CATH LAB;  Service: Vascular;  Laterality: N/A;    REMOVAL OF TUNNELED CENTRAL VENOUS CATHETER (CVC) N/A 5/16/2019    Procedure: REMOVAL, CATHETER, CENTRAL VENOUS, TUNNELED;  Surgeon: Edy Gonzalez MD;  Location: [facility]  CATH LAB;  Service: Nephrology;  Laterality: N/A;    REVISION OF ARTERIOVENOUS FISTULA Left 11/18/2021    Procedure: REVISION, AV FISTULA;  Surgeon: CARLI Thomason II, MD;  Location: [facility] OR [address] [place] [address];  Service: Vascular;  Laterality: Left;  Ligation of side branches    TONSILLECTOMY       Social History     Occupational History    Not on file   Tobacco Use    Smoking status: Never    Smokeless tobacco: Never   Substance and Sexual Activity    Alcohol use: No    Drug use: No    Sexual activity: Not on file       Review of Systems   Constitutional: Negative for weight loss.   HENT:  Positive for hoarse voice (chronic). Negative for ear pain and nosebleeds.    Eyes:  Negative for discharge and pain.   Cardiovascular:  Negative for chest pain and palpitations.   Respiratory:  Negative for cough, shortness of breath and wheezing.    Endocrine: Negative for cold intolerance, heat intolerance and polyphagia.   Hematologic/Lymphatic: Negative for adenopathy. Does not bruise/bleed easily.   Skin:  Negative for itching and rash.   Musculoskeletal:  Negative for joint swelling and muscle cramps.   Gastrointestinal:  Negative for abdominal pain, diarrhea, nausea and vomiting.   Genitourinary:  Negative for dysuria and flank pain.   Neurological:  Negative for numbness and seizures.           II. PHYSICAL EXAM     Physical Exam  Vitals reviewed.   Constitutional:       Appearance: Normal appearance. He is normal weight.   HENT:      Head: Normocephalic and atraumatic.      Comments: hoarse  Eyes:      Extraocular Movements: Extraocular movements intact.      Pupils: Pupils are equal, round, and reactive to light.   Cardiovascular:      Rate and Rhythm: Normal rate.      Comments: Palpable thrill L arm fistula   2+ palpable radial pulses bilaterally    Pulmonary:      Effort: Pulmonary effort is normal.      Comments: On home oxygen 2L  Abdominal:      General: Abdomen is flat.      Palpations: Abdomen is soft.    Musculoskeletal:         General: Normal range of motion.      Cervical back: Normal range of motion.   Skin:     General: Skin is warm and dry.   Neurological:      General: No focal deficit present.      Mental Status: He is alert. Mental status is at baseline.      Cranial Nerves: Cranial nerve deficit (chronic hoarseness) present.               III. ASSESSMENT & PLAN (MEDICAL DECISION MAKING)     1. Stenosis of arteriovenous dialysis fistula, initial encounter    2. ESRD on hemodialysis          Imaging Results:  (I have personally reviewed the imaging and provided my interpretation below)      LUE AVF U/S 7/25/22: volume flow >900ml/min. Multiple velocity elevations suggestive of stenosis.    LUE AVF u/s 1/9/22: PSV elevation to 446 cm/s (VR >9) and 758 cm/s (VR=8) at distal AVF. Volume flow decreased to 655ml/min.     Assessment/Diagnosis and Plan:  62 y.o. male with left radiocephalic AVF s/p ligation of side branches and PTA of mid-forearm stenosis.  Patient is doing well with and dialyzing through his AV but with increased venous pressures. He has evidence of stenosis on ultrasound today. Plan left arm fistulogram with possible endovascular intervention. Risks, benefits, alternatives explained. Patient had no questions at end of visit.    -Continue using LUE AVF for HD  -Plan left arm fistulogram with possible endovascular intervention for suspected outflow vein stenosis    CARLI Thomason II, MD, [facility]  Vascular Surgery  [facility]

## 2023-01-17 NOTE — H&P (VIEW-ONLY)
VASCULAR SURGERY NOTE    Patient ID: Martin Hendrix Jr. is a 62 y.o. male.    I. HISTORY     Chief Complaint: AVF    HPI: Martin Hendrix Jr. is a 62 y.o. male who is here today for established patient appointment.  The patient has a left radiocephalic AV fistula in the distal forearm which was created by me on 10/13/2021.  He returned to the OR 11/18/21 for ligation of side branches because the fistula had not had adequate maturation at his follow up ultrasound. He then was found to have focal stenosis on ultrasound which taken to OR for fistulagram and PTA with resolution of stenosis on 1/10/22. He has been cannulating/dialyzing through his AVF since that time but recently has had high venous pressures. Denies prolonged bleeding from cannulation sites. No cannulation issues but does have alarms on HD. He returns today for surveillance u/s.      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 Peacock    DM (diabetes mellitus) 11/2017     02/22/2021 Insulin x 2017    Hypertension     On home oxygen therapy     KRISTYN on CPAP     Osteopenia     Pancreatitis 2009    hospitalized    Pulmonary hypertension     Pure hypercholesterolemia 11/15/2017    Sarcoidosis     Shortness of breath     Type 2 diabetes mellitus 11/5/2017        Past Surgical History:   Procedure Laterality Date    ABDOMINAL SURGERY      6 weeks old    AV FISTULA PLACEMENT Left 10/13/2021    Procedure: CREATION, AV FISTULA;  Surgeon: CARLI Thomason II, MD;  Location: [facility] OR [address] [place] [address];  Service: Vascular;  Laterality: Left;    BRONCHOSCOPY      BRONCHOSCOPY N/A 8/22/2018    Procedure: flexible bronchoscopy with tissue biopsy CPT 13773;  Surgeon: [facility] Diagnostic Provider;  Location: [facility] OR [address] [place] [address];  Service: Endoscopy;   Laterality: N/A;    BRONCHOSCOPY N/A 11/20/2018    Procedure: flexible bronchoscopy with tissue biopy CPT 25274;  Surgeon: [facility] Diagnostic Provider;  Location: [facility] OR 2ND FLR;  Service: Anesthesiology;  Laterality: N/A;    BRONCHOSCOPY N/A 11/10/2021    Procedure: Flexible bronchoscopy;  Surgeon: Samantha Bill DO;  Location: [facility] OR 2ND FLR;  Service: Endoscopy;  Laterality: N/A;    CHEST TUBE INSERTION      CHOLECYSTECTOMY      COLONOSCOPY      COLONOSCOPY N/A 11/2/2022    Procedure: COLONOSCOPY;  Surgeon: Hartiha Hendrix DO;  Location: [facility] ENDO;  Service: General;  Laterality: N/A;    ESOPHAGOGASTRODUODENOSCOPY N/A 8/6/2018    Procedure: EGD (ESOPHAGOGASTRODUODENOSCOPY);  Surgeon: Ramu Eisenberg MD;  Location: [facility] (2ND FLR);  Service: Endoscopy;  Laterality: N/A;  off pepcid and all acid reducers for 2 weeks; PA > 50    FISTULOGRAM Left 1/10/2022    Procedure: Fistulogram;  Surgeon: CARLI Thomason II, MD;  Location: [facility] CATH LAB;  Service: Vascular;  Laterality: Left;    INSERTION OF TUNNELED CENTRAL VENOUS HEMODIALYSIS CATHETER N/A 3/13/2019    Procedure: INSERTION, CATHETER, CENTRAL VENOUS, HEMODIALYSIS, TUNNELED;  Surgeon: Edy Gonzalez MD;  Location: [facility] CATH LAB;  Service: Nephrology;  Laterality: N/A;    INSERTION OF TUNNELED CENTRAL VENOUS HEMODIALYSIS CATHETER Right 5/7/2021    Procedure: Insertion, Catheter, Central Venous, Hemodialysis;  Surgeon: Mary Joshi MD;  Location: [facility] CATH LAB;  Service: Interventional Nephrology;  Laterality: Right;    LUNG BIOPSY      LUNG TRANSPLANT  08/2017    PERCUTANEOUS TRANSLUMINAL ANGIOPLASTY OF ARTERIOVENOUS FISTULA N/A 1/10/2022    Procedure: PTA, AV FISTULA;  Surgeon: CARLI Thomason II, MD;  Location: [facility] CATH LAB;  Service: Vascular;  Laterality: N/A;    REMOVAL OF TUNNELED CENTRAL VENOUS CATHETER (CVC) N/A 5/16/2019    Procedure: REMOVAL, CATHETER, CENTRAL VENOUS, TUNNELED;  Surgeon: Edy Gonzalez MD;  Location: [facility]  CATH LAB;  Service: Nephrology;  Laterality: N/A;    REVISION OF ARTERIOVENOUS FISTULA Left 11/18/2021    Procedure: REVISION, AV FISTULA;  Surgeon: CARLI Thomason II, MD;  Location: [facility] OR [address] [place] [address];  Service: Vascular;  Laterality: Left;  Ligation of side branches    TONSILLECTOMY       Social History     Occupational History    Not on file   Tobacco Use    Smoking status: Never    Smokeless tobacco: Never   Substance and Sexual Activity    Alcohol use: No    Drug use: No    Sexual activity: Not on file       Review of Systems   Constitutional: Negative for weight loss.   HENT:  Positive for hoarse voice (chronic). Negative for ear pain and nosebleeds.    Eyes:  Negative for discharge and pain.   Cardiovascular:  Negative for chest pain and palpitations.   Respiratory:  Negative for cough, shortness of breath and wheezing.    Endocrine: Negative for cold intolerance, heat intolerance and polyphagia.   Hematologic/Lymphatic: Negative for adenopathy. Does not bruise/bleed easily.   Skin:  Negative for itching and rash.   Musculoskeletal:  Negative for joint swelling and muscle cramps.   Gastrointestinal:  Negative for abdominal pain, diarrhea, nausea and vomiting.   Genitourinary:  Negative for dysuria and flank pain.   Neurological:  Negative for numbness and seizures.           II. PHYSICAL EXAM     Physical Exam  Vitals reviewed.   Constitutional:       Appearance: Normal appearance. He is normal weight.   HENT:      Head: Normocephalic and atraumatic.      Comments: hoarse  Eyes:      Extraocular Movements: Extraocular movements intact.      Pupils: Pupils are equal, round, and reactive to light.   Cardiovascular:      Rate and Rhythm: Normal rate.      Comments: Palpable thrill L arm fistula   2+ palpable radial pulses bilaterally    Pulmonary:      Effort: Pulmonary effort is normal.      Comments: On home oxygen 2L  Abdominal:      General: Abdomen is flat.      Palpations: Abdomen is soft.    Musculoskeletal:         General: Normal range of motion.      Cervical back: Normal range of motion.   Skin:     General: Skin is warm and dry.   Neurological:      General: No focal deficit present.      Mental Status: He is alert. Mental status is at baseline.      Cranial Nerves: Cranial nerve deficit (chronic hoarseness) present.               III. ASSESSMENT & PLAN (MEDICAL DECISION MAKING)     1. Stenosis of arteriovenous dialysis fistula, initial encounter    2. ESRD on hemodialysis          Imaging Results:  (I have personally reviewed the imaging and provided my interpretation below)      LUE AVF U/S 7/25/22: volume flow >900ml/min. Multiple velocity elevations suggestive of stenosis.    LUE AVF u/s 1/9/22: PSV elevation to 446 cm/s (VR >9) and 758 cm/s (VR=8) at distal AVF. Volume flow decreased to 655ml/min.     Assessment/Diagnosis and Plan:  62 y.o. male with left radiocephalic AVF s/p ligation of side branches and PTA of mid-forearm stenosis.  Patient is doing well with and dialyzing through his AV but with increased venous pressures. He has evidence of stenosis on ultrasound today. Plan left arm fistulogram with possible endovascular intervention. Risks, benefits, alternatives explained. Patient had no questions at end of visit.    -Continue using LUE AVF for HD  -Plan left arm fistulogram with possible endovascular intervention for suspected outflow vein stenosis    CARLI Thomason II, MD, [facility]  Vascular Surgery  [facility]

## 2023-01-20 NOTE — BRIEF OP NOTE
[facility] - Surgery ([facility])  Brief Operative Note    Surgery Date: 1/20/2023     Surgeon(s) and Role:     * CARLI Thomason II, MD - Primary     * Vick Osorio MD - Fellow    Assisting Surgeon: None    Pre-op Diagnosis:  ESRD (end stage renal disease) on dialysis [N18.6, Z99.2]    Post-op Diagnosis:  Post-Op Diagnosis Codes:     * ESRD (end stage renal disease) on dialysis [N18.6, Z99.2]    Procedures:  LUE AV fistula access under ultrasound guidance  LUE AV fistulagram  PTA (4x40, 6x40 [place], 6x80 [place], 7x60 [place])    Anesthesia: Local MAC    Operative Findings: two areas of stenosis in mid fistula and outflow, complete resolution of mid fistula, 20% residual stenosis in outflow tract.     Estimated Blood Loss: * No values recorded between 1/20/2023 10:01 AM and 1/20/2023 11:03 AM *         Specimens:   Specimen (24h ago, onward)      None              Discharge Note    OUTCOME: Patient tolerated treatment/procedure well without complication and is now ready for discharge.    DISPOSITION: Home or Self Care    FINAL DIAGNOSIS:  <principal problem not specified>    FOLLOWUP: In clinic    DISCHARGE INSTRUCTIONS:  No discharge procedures on file.

## 2023-01-20 NOTE — PATIENT INSTRUCTIONS
Once you go home, please abide by the following restrictions:    Rest in bed or a recliner for the remainder of the day following the procedure.     You should not go home alone the day of the procedure.    Lifting and activity restrictions depend on the insertion site for the procedure:  - Wrist or hand or arm:   - Do not lift more than 5 pounds for the first 24 hours and do not lift more than 10 pounds with the affected arm for 1 week.   - Do not use affected arm for pulling up in bed, adjusting position, or weight bearing exercise.   - Avoid flexing the wrist, such as hammering, playing tennis, or swinging objects.   - Avoid moisture or submerging hand for 7 days (tub, swimming, dishes). Showering is OK.    Groin:   - No heavy lifting of more than 5 pounds, running, swimming, or strenuous walking for at least 2 days after the angiogram.   - You may return to your usual activity after the two days.   - Do not take a hot bath or shower for at least 12 hours after discharge.    Keep an adhesive bandage on the catheter insertion site for one day to help prevent infection.    Because of the sedation you were given for your procedure, we recommend that you have someone stay with you overnight following your procedure.  - Do not drive a car or operate machinery for the remainder of the day.  - Do not consume any alcoholic beverages for the remainder of the day.  - Postpone signing any important papers or making any important decision for the remainder of the day.  - Do not take any muscle relaxants, sedatives, hypnotics, or mood-altering medication today unless ordered by your physician who is aware you are taking the medication today.    If bleeding occurs:  - Apply manual pressure, and immediately seek medical attention at the nearest hospital.   - Seek immediate medical attention if you experience loss of sensation, redness, swelling or discharge from the insertion site.

## 2023-01-20 NOTE — OP NOTE
[facility] - Surgery ([facility])  Brief Operative Note     Surgery Date: 1/20/2023      Surgeon(s) and Role:     * CARLI Thomason II, MD - Primary     * Vick Osorio MD - Fellow     Assisting Surgeon: None     Pre-op Diagnosis:  ESRD (end stage renal disease) on dialysis [N18.6, Z99.2]     Post-op Diagnosis:  Post-Op Diagnosis Codes:     * ESRD (end stage renal disease) on dialysis [N18.6, Z99.2]     Procedures:  LUE AV fistula access under ultrasound guidance  LUE AV fistulagram  PTA (4x40, 6x40 [place], 6x80 [place], 7x60 [place])    Indications:  62-year-old man with end-stage renal disease found to have increased venous pressures while on dialysis and 2 areas of high-grade stenosis identified on ultrasound.  The patient was consented for a left upper extremity AV fistulogram.     Anesthesia: Local MAC     Operative Findings: two areas of stenosis in mid fistula and outflow, complete resolution of mid fistula, 20% residual stenosis in outflow tract.      Estimated Blood Loss: * No values recorded between 1/20/2023 10:01 AM and 1/20/2023 11:03 AM *         Specimens:   Specimen (24h ago, onward)        None     Procedure in detail:  After obtaining signed consent, the patient was brought to the operating room and placed in supine position.  Mac anesthesia was induced.  The left upper extremity was prepped and draped in the usual sterile fashion.  The fistula was imaged with the ultrasound and found to be patent.  The fistula was accessed under ultrasound guidance with a micropuncture needle followed by 0.018 Mandril wire and a micropuncture sheath.  Fistulogram was performed through the sheath identifying 2 areas of high-grade stenosis in the mid fistula and venous outflow.  Central venous system was patent with no areas of stenosis.  Next, a 0.035 glidewire was advanced through the micropuncture sheath into the upper arm and the micropuncture sheath was exchanged for short 6 [place] sheath.  The venous  outflow stenosis was initially PTA with 4 x 40 mm [place] balloon.  This came to profile with residual stenosis so it was again PTA with 6 x 40 mm balloon.  The mid fistula stenosis was also PTA with 6 x 40 mm balloon.  Repeat fistulogram following this demonstrated approximately 30% residual stenosis in both areas.  The venous outflow was PTA with 6 x 80 mustang balloon brought to rated rupture pressure.  The mid fistula stenosis with PTA was suddenly a 60.  Completion fistulogram demonstrated resolution of mid fistula stenosis and 20% residual stenosis in the outflow tract.  Procedure was terminated at this point.  The wire and sheath were removed, access site made hemostatic with 3-0 Monocryl U-stitch.  Fistula was noted to have palpable thrill at this time.  No heparin was given.    Sponge and instrument count were correct x2    Dr. Thomason was present for critical portions of the case.     Vick Osorio  Vascular Surgery Fellow, PGY-6  [phone]

## 2023-01-20 NOTE — PLAN OF CARE
Discharge instructions reviewed with patient. Verbalized understanding. Packet received. VS stable and WNL. Pt home O2 NC 2L/MIN baseline at bedside. Belonging returned to patient. Family ready for pickup via wheelchair to main entrance.

## 2023-01-20 NOTE — ANESTHESIA POSTPROCEDURE EVALUATION
Anesthesia Post Evaluation    Patient: Martin Hendrix Jr.    Procedure(s) Performed: Procedure(s) (LRB):  LEFT UPPER EXTREMITY FISTULOGRAM WITH PTA  AND BALOON ANGIOPLASTY. (Left)    Final Anesthesia Type: MAC      Patient location during evaluation: PACU  Patient participation: Yes- Able to Participate  Level of consciousness: awake and alert  Post-procedure vital signs: reviewed and stable  Pain management: adequate  Airway patency: patent    PONV status at discharge: No PONV  Anesthetic complications: no      Cardiovascular status: hemodynamically stable  Respiratory status: spontaneous ventilation and nasal cannula (on baseline 2L NC)  Follow-up not needed.          Vitals Value Taken Time   BP 97/56 01/20/23 1120   Temp 36.4 °C (97.6 °F) 01/20/23 1108   Pulse 60 01/20/23 1128   Resp 18 01/20/23 1108   SpO2 100 % 01/20/23 1128   Vitals shown include unvalidated device data.      No case tracking events are documented in the log.      Pain/Radha Score: Pain Rating Prior to Med Admin: 0 (1/20/2023  7:45 AM)

## 2023-01-20 NOTE — TRANSFER OF CARE
"Anesthesia Transfer of Care Note    Patient: Martin Hendrix Jr.    Procedure(s) Performed: Procedure(s) (LRB):  LEFT UPPER EXTREMITY FISTULOGRAM WITH PTA  AND BALOON ANGIOPLASTY. (Left)    Patient location: [facility]    Anesthesia Type: general    Transport from OR: Transported from OR on 6-10 L/min O2 by face mask with adequate spontaneous ventilation    Post pain: adequate analgesia    Post assessment: no apparent anesthetic complications    Post vital signs: stable    Level of consciousness: responds to stimulation and sedated    Nausea/Vomiting: no nausea/vomiting    Complications: none    Transfer of care protocol was followed      Last vitals:   Visit Vitals  BP (!) 102/57 (BP Location: Right arm, Patient Position: Lying)   Pulse 60   Temp 36.4 °C (97.6 °F) (Temporal)   Resp 18   Ht 5' 11" (1.803 m)   Wt 90.7 kg (200 lb)   SpO2 100%   BMI 27.89 kg/m²     "

## 2023-01-20 NOTE — INTERVAL H&P NOTE
The patient has been examined and the H&P has been reviewed:    I concur with the findings and no changes have occurred since H&P was written.    Procedure risks, benefits and alternative options discussed and understood by patient/family.    Consent in media.     Patient Active Problem List   Diagnosis    Sarcoidosis of lung    Secondary pulmonary hypertension    Chronic respiratory failure with hypoxia    Chronic pulmonary heart disease    Pulmonary hypertension associated with sarcoidosis    Adverse effect of corticosteroids    Immunosuppression    Class 1 obesity due to excess calories with serious comorbidity and body mass index (BMI) of 30.0 to 30.9 in adult    Lung replaced by transplant    Prophylactic antibiotic    Cellulitis of right upper extremity    Paroxysmal atrial fibrillation    Complication of lung transplant    Dysphonia    Hyponatremia    Unilateral complete vocal fold paralysis    Complication of transplanted lung    Type 2 diabetes mellitus with hyperglycemia, with long-term current use of insulin    Pure hypercholesterolemia    Pulmonary aspergilloma    Encounter following organ transplant    GERD (gastroesophageal reflux disease)    CMV (cytomegalovirus infection) status positive    Mixed hyperlipidemia    BMI 31.0-31.9,adult    Cytomegalovirus (CMV) viremia    Hypertension associated with diabetes    CKD (chronic kidney disease) stage 5, GFR less than 15 ml/min    Colon cancer screening    History of pancreatitis    Leukocytosis    Pseudocyst of pancreas    Sarcoidosis    Encounter for long-term (current) use of medications    Combined hyperlipidemia associated with type 2 diabetes mellitus    CKD stage 5 due to type 2 diabetes mellitus    Proteinuria    Diarrhea    Acute rejection of lung transplant    Dyspnea on exertion    Antibody mediated rejection of lung transplant    Anemia due to chronic kidney disease    Extremity edema    Frailty    Acute hypoxemic respiratory failure     Transaminitis    Pre-op evaluation    ESRD (end stage renal disease)    ESRD on hemodialysis    Stenosis of AV fistula    COVID-19    Post viral debility    Chronic obstructive pulmonary disease    End stage renal disease due to benign hypertension    History of severe acute respiratory syndrome coronavirus 2 (SARS-CoV-2) disease    Immunocompromised state    Long term current use of insulin    Multiple benign melanocytic nevi    Obstructive sleep apnea syndrome    Oxygen dependent    Secondary hyperparathyroidism of renal origin

## 2023-01-20 NOTE — ANESTHESIA PREPROCEDURE EVALUATION
01/20/2023  Martin Hendrix Jr. is a 62 y.o., male.  Pre-operative evaluation for Procedure(s) (LRB):  FISTULOGRAM, WITH PTA (Left)    Martin Hendrix Jr. is a 62 y.o. male   2L continuous home O2  Can speak in short, but complete, sentences without significant increased WOB  Dialyzed yesterday for the full time without issue      Patient Active Problem List   Diagnosis    Sarcoidosis of lung    Secondary pulmonary hypertension    Chronic respiratory failure with hypoxia    Chronic pulmonary heart disease    Pulmonary hypertension associated with sarcoidosis    Adverse effect of corticosteroids    Immunosuppression    Class 1 obesity due to excess calories with serious comorbidity and body mass index (BMI) of 30.0 to 30.9 in adult    Lung replaced by transplant    Prophylactic antibiotic    Cellulitis of right upper extremity    Paroxysmal atrial fibrillation    Complication of lung transplant    Dysphonia    Hyponatremia    Unilateral complete vocal fold paralysis    Complication of transplanted lung    Type 2 diabetes mellitus with hyperglycemia, with long-term current use of insulin    Pure hypercholesterolemia    Pulmonary aspergilloma    Encounter following organ transplant    GERD (gastroesophageal reflux disease)    CMV (cytomegalovirus infection) status positive    Mixed hyperlipidemia    BMI 31.0-31.9,adult    Cytomegalovirus (CMV) viremia    Hypertension associated with diabetes    CKD (chronic kidney disease) stage 5, GFR less than 15 ml/min    Colon cancer screening    History of pancreatitis    Leukocytosis    Pseudocyst of pancreas    Sarcoidosis    Encounter for long-term (current) use of medications    Combined hyperlipidemia associated with type 2 diabetes mellitus    CKD stage 5 due to type 2 diabetes mellitus    Proteinuria    Diarrhea    Acute rejection  of lung transplant    Dyspnea on exertion    Antibody mediated rejection of lung transplant    Anemia due to chronic kidney disease    Extremity edema    Frailty    Acute hypoxemic respiratory failure    Transaminitis    Pre-op evaluation    ESRD (end stage renal disease)    ESRD on hemodialysis    Stenosis of AV fistula    COVID-19    Post viral debility    Chronic obstructive pulmonary disease    End stage renal disease due to benign hypertension    History of severe acute respiratory syndrome coronavirus 2 (SARS-CoV-2) disease    Immunocompromised state    Long term current use of insulin    Multiple benign melanocytic nevi    Obstructive sleep apnea syndrome    Oxygen dependent    Secondary hyperparathyroidism of renal origin       Past Surgical History:   Procedure Laterality Date    ABDOMINAL SURGERY      6 weeks old    AV FISTULA PLACEMENT Left 10/13/2021    Procedure: CREATION, AV FISTULA;  Surgeon: CARLI Thomason II, MD;  [facility];  Service: Vascular;  Laterality: Left;    BRONCHOSCOPY      BRONCHOSCOPY N/A 8/22/2018    Procedure: flexible bronchoscopy with tissue biopsy CPT 51592;  Surgeon: [facility] Diagnostic Provider;  [facility];  Service: Endoscopy;  Laterality: N/A;    BRONCHOSCOPY N/A 11/20/2018    Procedure: flexible bronchoscopy with tissue biopy CPT 50534;  Surgeon: [facility] Diagnostic Provider;  [facility];  Service: Anesthesiology;  Laterality: N/A;    BRONCHOSCOPY N/A 11/10/2021    Procedure: Flexible bronchoscopy;  Surgeon: Samantha Bill DO;  [facility];  Service: Endoscopy;  Laterality: N/A;    CHEST TUBE INSERTION      CHOLECYSTECTOMY      COLONOSCOPY      COLONOSCOPY N/A 11/2/2022    Procedure: COLONOSCOPY;  Surgeon: Haritha Hendrix DO;  Location: [facility];  Service: General;  Laterality: N/A;    ESOPHAGOGASTRODUODENOSCOPY N/A 8/6/2018    Procedure: EGD (ESOPHAGOGASTRODUODENOSCOPY);   Surgeon: Ramu Eisenberg MD;  Location: [facility] ENDO (2ND FLR);  Service: Endoscopy;  Laterality: N/A;  off pepcid and all acid reducers for 2 weeks; PA > 50    FISTULOGRAM Left 1/10/2022    Procedure: Fistulogram;  Surgeon: CARLI Thomason II, MD;  Location: [facility] CATH LAB;  Service: Vascular;  Laterality: Left;    INSERTION OF TUNNELED CENTRAL VENOUS HEMODIALYSIS CATHETER N/A 3/13/2019    Procedure: INSERTION, CATHETER, CENTRAL VENOUS, HEMODIALYSIS, TUNNELED;  Surgeon: Edy Gonzalez MD;  Location: [facility] CATH LAB;  Service: Nephrology;  Laterality: N/A;    INSERTION OF TUNNELED CENTRAL VENOUS HEMODIALYSIS CATHETER Right 5/7/2021    Procedure: Insertion, Catheter, Central Venous, Hemodialysis;  Surgeon: Mary Joshi MD;  Location: [facility] CATH LAB;  Service: Interventional Nephrology;  Laterality: Right;    LUNG BIOPSY      LUNG TRANSPLANT  08/2017    PERCUTANEOUS TRANSLUMINAL ANGIOPLASTY OF ARTERIOVENOUS FISTULA N/A 1/10/2022    Procedure: PTA, AV FISTULA;  Surgeon: CARLI Thomason II, MD;  Location: [facility] CATH LAB;  Service: Vascular;  Laterality: N/A;    REMOVAL OF TUNNELED CENTRAL VENOUS CATHETER (CVC) N/A 5/16/2019    Procedure: REMOVAL, CATHETER, CENTRAL VENOUS, TUNNELED;  Surgeon: Edy Gonzalez MD;  Location: [facility] CATH LAB;  Service: Nephrology;  Laterality: N/A;    REVISION OF ARTERIOVENOUS FISTULA Left 11/18/2021    Procedure: REVISION, AV FISTULA;  Surgeon: CARLI Thomason II, MD;  Location: [facility] OR 2ND FLR;  Service: Vascular;  Laterality: Left;  Ligation of side branches    TONSILLECTOMY         Social History     Socioeconomic History    Marital status:    Tobacco Use    Smoking status: Never    Smokeless tobacco: Never   Substance and Sexual Activity    Alcohol use: No    Drug use: No       Current Facility-Administered Medications on File Prior to Encounter   Medication Dose Route Frequency Provider Last Rate Last Admin    0.9%  NaCl infusion   Intravenous Continuous  Cait Yan MD        cefazolin (ANCEF) 2 gram in dextrose 5% 50 mL IVPB (premix)  2 g Intravenous On Call Procedure Cait Yan MD         Current Outpatient Medications on File Prior to Encounter   Medication Sig Dispense Refill    amLODIPine (NORVASC) 10 MG tablet TAKE 1 TABLET BY MOUTH EVERY DAY 90 tablet 3    azithromycin (Z-REYNA) 250 MG tablet Take 1 tablet (250 mg total) by mouth every Mon, Wed, Fri. 13 tablet 11    cinacalcet (SENSIPAR) 30 MG Tab One po QDAY, with largest meal 30 tablet 11    cloNIDine (CATAPRES) 0.1 MG tablet Take 1 tablet (0.1 mg total) by mouth 3 (three) times daily. 90 tablet 11    ECOTRIN LOW STRENGTH 81 mg EC tablet TAKE 1 TABLET DAILY 2 tablet 4    epoetin greer-epbx (RETACRIT) 4,000 unit/mL injection Inject 1.41 mLs (5,640 Units total) into the skin every Tues, Thurs, Sat. Administered by dialysis unit on T/Th/Sat.      everolimus, immunosuppressive, (ZORTRESS) 0.75 mg tablet Take 1 tablet (0.75 mg total) by mouth every 12 (twelve) hours. 60 tablet 11    ferric citrate (AURYXIA) 210 mg iron Tab TAKE 3 TABLETS BY MOUTH 3 TIMES A DAY WITH FOOD 810 tablet 3    folic acid (FOLVITE) 1 MG tablet TAKE 1 TABLET DAILY 90 tablet 4    furosemide (LASIX) 40 MG tablet One po QDAY 90 tablet 3    insulin aspart U-100 (NOVOLOG FLEXPEN U-100 INSULIN) 100 unit/mL (3 mL) InPn pen Inject 10 Units into the skin 3 (three) times daily with meals. 15 mL 11    insulin degludec (TRESIBA FLEXTOUCH U-200) 200 unit/mL (3 mL) insulin pen INJECT 20 UNITS INTO THE SKIN EVERY EVENING 9 pen 4    magnesium chloride (MAG 64) 64 mg TbEC Take 2 tablets (128 mg total) by mouth 2 (two) times daily. 120 tablet 11    metoprolol tartrate (LOPRESSOR) 25 MG tablet Take 1 tablet (25 mg total) by mouth 2 (two) times daily. 180 tablet 3    ondansetron (ZOFRAN-ODT) 8 MG TbDL Dissolve 1 tablet (8 mg total) by mouth daily as needed (pre-med for IVIG infusions). 15 tablet 3    pantoprazole (PROTONIX) 40 MG tablet  "Take 1 tablet (40 mg total) by mouth once daily. 30 tablet 11    predniSONE (DELTASONE) 5 MG tablet TAKE 1 TABLET BY MOUTH EVERY DAY 90 tablet 4    rosuvastatin (CRESTOR) 10 MG tablet Take 1 tablet (10 mg total) by mouth once daily. 30 tablet 3    sulfamethoxazole-trimethoprim 400-80mg (BACTRIM,SEPTRA) 400-80 mg per tablet Take 1 tablet by mouth every Mon, Wed, Fri. 15 tablet 11    tacrolimus (PROGRAF) 0.5 MG Cap Take 3 capsules (1.5 mg total) by mouth every 12 (twelve) hours. 180 capsule 11    valsartan (DIOVAN) 160 MG tablet Take 1 tablet (160 mg total) by mouth once daily. (Patient taking differently: Take 160 mg by mouth every evening.) 90 tablet 3    cholecalciferol, vitamin D3, (VITAMIN D3) 25 mcg (1,000 unit) capsule Take 2 capsules (2,000 Units total) by mouth once daily.  0    lancets Misc To check BG 4 times daily, to use with insurance preferred meter 350 each 3    ONETOUCH VERIO Strp USE TO CHECK BLOOD GLUCOSE FOUR TIMES A DAY, TO USE WITH INSURANCE PREFERRED METER 350 each 3    pen needle, diabetic (BD ULTRA-FINE JIM PEN NEEDLE) 32 gauge x 5/32" Ndle Uses 4 times daily, on multiple daily insulin injections 350 each 3    pulse oximeter (PULSE OXIMETER) device by Apply Externally route 2 (two) times a day. Use twice daily at 8 AM and 3 PM and record the value in [facility] as directed. 1 each 0    sodium bicarbonate 650 MG tablet Two in am and two in pm 120 tablet 11    [DISCONTINUED] ACCU-CHEK DEANNE PLUS METER Misc USE AS DIRECTED  0       Review of patient's allergies indicates:  No Known Allergies      CBC: No results for input(s): WBC, RBC, HGB, HCT, PLT, MCV, MCH, MCHC in the last 72 hours.    CMP: No results for input(s): NA, K, CL, CO2, BUN, CREATININE, GLU, MG, PHOS, CALCIUM, ALBUMIN, PROT, ALKPHOS, ALT, AST, BILITOT in the last 72 hours.    INR  No results for input(s): PT, INR, PROTIME, APTT in the last 72 hours.      Diagnostic Studies:    EKG:   Results for orders placed or performed " during the hospital encounter of 02/12/22   EKG 12-lead    Collection Time: 02/16/22  8:32 PM    Narrative    Test Reason : R00.0,    Vent. Rate : 098 BPM     Atrial Rate : 098 BPM     P-R Int : 178 ms          QRS Dur : 110 ms      QT Int : 398 ms       P-R-T Axes : 023 012 149 degrees     QTc Int : 508 ms    Normal sinus rhythm  Incomplete right bundle branch block  Minimal voltage criteria for LVH, may be normal variant  Nonspecific ST and T wave abnormality  Prolonged QT  Abnormal ECG  When compared with ECG of 12-JUL-2021 11:34,  T wave inversion more evident in Anterior-lateral leads  Confirmed by Carlton North MD ([id number]) on 2/17/2022 10:59:49 AM    Referred By: JULEE RAE           Confirmed By:Carlton North MD       TTE:  Results for orders placed or performed during the hospital encounter of 08/22/22   Echo Saline Bubble? No   Result Value Ref Range    BSA 2.16 m2    TDI SEPTAL 0.06 m/s    LV LATERAL E/E' RATIO 5.00 m/s    LV SEPTAL E/E' RATIO 6.67 m/s    LA WIDTH 3.80 cm    Left Ventricular Outflow Tract Mean Velocity 0.66 cm/s    Left Ventricular Outflow Tract Mean Gradient 1.94 mmHg    TDI LATERAL 0.08 m/s    PV PEAK VELOCITY 0.99 cm/s    LVIDd 4.48 3.5 - 6.0 cm    IVS 1.15 (A) 0.6 - 1.1 cm    Posterior Wall 1.13 (A) 0.6 - 1.1 cm    Ao root annulus 3.74 cm    LVIDs 3.19 2.1 - 4.0 cm    FS 29 28 - 44 %    LA volume 89.81 cm3    Sinus 3.41 cm    STJ 3.25 cm    Ascending aorta 2.84 cm    LV mass 182.81 g    LA size 4.46 cm    RVDD 2.15 cm    TAPSE 1.43 cm    Left Ventricle Relative Wall Thickness 0.50 cm    AV mean gradient 3 mmHg    AV valve area 3.48 cm2    AV Velocity Ratio 0.81     AV index (prosthetic) 0.80     MV valve area p 1/2 method 3.79 cm2    E/A ratio 0.48     Mean e' 0.07 m/s    E wave deceleration time 200.00 msec    IVRT 117.03 msec    Pulm vein S/D ratio 1.22     LVOT diameter 2.35 cm    LVOT area 4.3 cm2    LVOT peak colt 0.88 m/s    LVOT peak VTI 17.90 cm    Ao peak colt 1.09 m/s    Ao VTI  22.3 cm    RVOT peak olivier 0.84 m/s    RVOT peak VTI 17.9 cm    LVOT stroke volume 77.60 cm3    AV peak gradient 5 mmHg    PV mean gradient 1.77 mmHg    E/E' ratio 5.71 m/s    MV Peak E Olivier 0.40 m/s    TR Max Olivier 1.65 m/s    MV stenosis pressure 1/2 time 58.00 ms    MV Peak A Olivier 0.84 m/s    PV Peak S Olivier 0.60 m/s    PV Peak D Olivier 0.49 m/s    LV Systolic Volume 40.76 mL    LV Systolic Volume Index 19.0 mL/m2    LV Diastolic Volume 91.47 mL    LV Diastolic Volume Index 42.74 mL/m2    LA Volume Index 42.0 mL/m2    LV Mass Index 85 g/m2    RA Major Axis 4.52 cm    Left Atrium Minor Axis 6.78 cm    Left Atrium Major Axis 5.77 cm    Triscuspid Valve Regurgitation Peak Gradient 11 mmHg    LA Volume Index (Mod) 35.8 mL/m2    LA volume (mod) 76.69 cm3    RA Width 2.49 cm    EF 55 %    Narrative    · The left ventricle is normal in size with concentric remodeling and   normal systolic function.  · Moderate left atrial enlargement.  · The estimated ejection fraction is 55%.  · Indeterminate left ventricular diastolic function.  · Normal right ventricular size with normal right ventricular systolic   function.  · Mild mitral regurgitation.  · Mild tricuspid regurgitation.        EF   Date Value Ref Range Status   08/22/2022 55 % Final   02/14/2022 50 % Final      No results found. However, due to the size of the patient record, not all encounters were searched. Please check Results Review for a complete set of results.    [place]:  No results found. However, due to the size of the patient record, not all encounters were searched. Please check Results Review for a complete set of results.    Stress Test:  No results found for this or any previous visit.       [facility]:  Results for orders placed during the hospital encounter of 01/10/22    Cardiac catheterization    Narrative  Procedure performed in the Invasive Lab  - See Procedure Log link below for nursing documentation  - See OpNote on Surgeries Tab for physician findings        PFT:  No results found for: FEV1, FVC, [id number], TLC, DLCO     ALLERGIES:   Review of patient's allergies indicates:  No Known Allergies  LDA:      Lines/Drains/Airways     Drain  Duration                Hemodialysis AV Fistula Left forearm -- days          Peripheral Intravenous Line  Duration                Peripheral IV - Single Lumen 01/20/23 0716 20 G Anterior;Right Wrist <1 day               Anesthesia Evaluation      Airway   Mallampati: II  TM distance: > 6 cm  Neck ROM: Normal ROM  Dental    (+) Intact    Pulmonary    (+) COPD, sleep apnea,   Cardiovascular   (+) hypertension,     Rate: Normal    Neuro/Psych      GI/Hepatic/Renal    (+) GERD, chronic renal disease ESRD and Dialysis,     Endo/Other    (+) diabetes mellitus,   Abdominal                         Pre-op Assessment    I have reviewed the Patient Summary Reports.     I have reviewed the Nursing Notes. I have reviewed the NPO Status.   I have reviewed the Medications.     Review of Systems  Anesthesia Hx:  No problems with previous Anesthesia  Denies Family Hx of Anesthesia complications.   Denies Personal Hx of Anesthesia complications.   Cardiovascular:   Hypertension    Pulmonary:   COPD Sleep Apnea S/p OLT 2017   Renal/:   Chronic Renal Disease, ESRD, Dialysis    Hepatic/GI:   GERD    Neurological:  Neurology Normal    Endocrine:   Diabetes        Physical Exam  General: Well nourished    Airway:  Mallampati: II   Mouth Opening: Normal  TM Distance: > 6 cm  Tongue: Normal  Neck ROM: Normal ROM    Dental:  Intact    Chest/Lungs:  Normal Respiratory Rate    Heart:  Rate: Normal        Anesthesia Plan  Type of Anesthesia, risks & benefits discussed:    Anesthesia Type: Gen Natural Airway, MAC  Intra-op Monitoring Plan: Standard ASA Monitors  Post Op Pain Control Plan: multimodal analgesia and IV/PO Opioids PRN  Induction:  IV  Airway Plan: Direct, Post-Induction  Informed Consent: Informed consent signed with the Patient and all parties  understand the risks and agree with anesthesia plan.  All questions answered. Patient consented to blood products? Yes  ASA Score: 3  Day of Surgery Review of History & Physical: H&P Update referred to the surgeon/provider.    Ready For Surgery From Anesthesia Perspective.     .

## 2023-01-25 NOTE — PROGRESS NOTES
LUNG TRANSPLANT RECIPIENT FOLLOW-UP     Reason for Visit: Follow-up after lung transplantation.                               Date of Transplant: 8/22/17     Reason for Transplant: Sarcoidosis with pulmonary hypertension     Type of Transplant: bilateral sequential lung     CMV Status: D+ / R-      Major Complications:   1. Left vocal cord dysfunction   2. 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  A1- 1/2021  6.  Increasing DSA noted in 02/2021- s/p tx in 04/2021 with PLEX/IVIG/MP and ritux   7. 2/2022 COVID infection with residual functional decline                                                                               History of Present Illness: Martin Hendrix Jr. is a 60 y.o. year old male with the above stated history.  He is on 2L of oxygen. He is on no assisted ventilation although he has been diagnosed with KRISTYN and owns a CPAP but does not use it.  His New York Heart Association Class is III and a Karnofsky score of 70% - Cares for self: Unable to carry on normal activity or active work.   He is diabetic insulin dependent.    Patient presents today for routine follow up. States he continues to have fatigue, particularly on HD days, but is overall sleeping better. He has his oxygen off during his visit, but states he usually has it on while at home. On 2L with exertion. Does not check his oxygenation while at rest. Has chronic rhinorrhea associated with changing seasons. Recently inpatient for AV fistula graft revision. ESRD on HD TThS. Compliant with his transplant medications and tolerating them well. Overall, discussed that his graft function continues to decline. Symptomatically remains stable. Non-compliant with CPAP due to issues with his mask, and he is aware of increased risk of cardiac events without use. No recent  "exacerbations/hospitalizations. States he is going to resume pulmonary rehab January 30.       Review of Systems   Constitutional: Negative for chills, diaphoresis, fever, malaise/fatigue and weight loss.   HENT: Negative for congestion, ear discharge, ear pain, hearing loss, nosebleeds, sinus pain, sore throat and tinnitus.    Eyes: Negative for blurred vision, double vision, photophobia, pain, discharge and redness.   Respiratory: Positive for shortness of breath (with exertion). Negative for cough, hemoptysis, sputum production, wheezing and stridor.    Cardiovascular: Negative for chest pain, palpitations, orthopnea, claudication, leg swelling and PND.   Gastrointestinal: Negative for abdominal pain, blood in stool, constipation, diarrhea, heartburn, melena, nausea and vomiting.   Genitourinary: Negative for dysuria, flank pain, frequency, hematuria and urgency.        Decreased urination since kidney failure  Musculoskeletal: Negative for back pain, falls, joint pain, myalgias and neck pain.   Skin: Negative for itching and rash.   Neurological: Negative for dizziness (notes occasional lightheadedness after HD), tingling, tremors, sensory change, speech change, focal weakness, seizures, loss of consciousness, weakness and headaches.   Endo/Heme/Allergies: Negative for environmental allergies and polydipsia. Does not bruise/bleed easily.   Psychiatric/Behavioral: Negative for depression, hallucinations, memory loss, substance abuse and suicidal ideas. The patient is not nervous/anxious.  Positive for insomnia.      BP (!) 146/70   Pulse 84   Temp 98.2 °F (36.8 °C) (Oral)   Resp 20   Ht 5' 10" (1.778 m)   Wt 91 kg (200 lb 9.9 oz)   SpO2 98% Comment: 2 liters  BMI 28.79 kg/m²     Physical Exam  Vitals and nursing note reviewed.   Constitutional:       General: He is not in acute distress.     Appearance: He is not ill-appearing or diaphoretic.   HENT:      Head: Normocephalic and atraumatic.      Nose: Nose " normal.      Mouth/Throat:      Pharynx: No oropharyngeal exudate.   Eyes:      General: No scleral icterus.     Extraocular Movements: Extraocular movements intact.      Conjunctiva/sclera: Conjunctivae normal.   Neck:      Thyroid: No thyromegaly.      Vascular: No JVD.      Trachea: No tracheal deviation.   Cardiovascular:      Rate and Rhythm: Normal rate and regular rhythm.      Heart sounds: Murmur (2/6 systolic murmur best heard at LUSB) heard.   Pulmonary:      Effort: Pulmonary effort is normal. No respiratory distress.      Breath sounds: No stridor.  No wheezing or rhonchi. Mild rales at the bases.  Chest:      Chest wall: No tenderness.   Abdominal:      General: Bowel sounds are normal. There is no distension.      Palpations: Abdomen is soft. There is no mass.      Tenderness: There is no abdominal tenderness. There is no guarding or rebound.   Musculoskeletal:         General: No tenderness or deformity. Normal range of motion.      Cervical back: Normal range of motion and neck supple.   Lymphadenopathy:      Cervical: No cervical adenopathy.   Skin:     General: Skin is warm and dry.      Coloration: Skin is not pale.      Findings: No erythema or rash.   Neurological:      Mental Status: He is alert and oriented to person, place, and time.      Cranial Nerves: No cranial nerve deficit.      Coordination: Coordination normal.      Gait: Gait is intact.   Psychiatric:         Mood and Affect: Mood and affect normal.         Cognition and Memory: Memory normal.         Judgment: Judgment normal.       Labs:  cbc, cmp, tacrolimus Latest Ref Rng & Units 1/18/2023 1/20/2023 1/25/2023   TACROLIMUS LVL 5.0 - 15.0 ng/mL 3.2(L) - -   WHITE BLOOD CELL COUNT 3.90 - 12.70 K/uL - - 9.38   RBC 4.60 - 6.20 M/uL - - 3.80(L)   HEMOGLOBIN 14.0 - 18.0 g/dL - - 11.5(L)   HEMATOCRIT 40.0 - 54.0 % - - 37.7(L)   MCV 82 - 98 fL - - 99(H)   MCH 27.0 - 31.0 pg - - 30.3   MCHC 32.0 - 36.0 g/dL - - 30.5(L)   RDW 11.5 - 14.5 %  - - 13.2   PLATELETS 150 - 450 K/uL - - 368   MPV 9.2 - 12.9 fL - - 8.8(L)   GRAN # 1.8 - 7.7 K/uL - - 5.3   LYMPH # 1.0 - 4.8 K/uL - - 2.0   MONO # 0.3 - 1.0 K/uL - - 1.3(H)   EOSINOPHIL% 0.0 - 8.0 % - - 7.6   BASOPHIL% 0.0 - 1.9 % - - 1.5   DIFFERENTIAL METHOD - - - Automated   SODIUM 136 - 145 mmol/L - 138 -   POTASSIUM 3.5 - 5.1 mmol/L - 4.0 -   CHLORIDE 95 - 110 mmol/L - 99 -   CO2 23 - 29 mmol/L - 27 -   GLUCOSE 70 - 110 mg/dL - 141(H) -   BUN BLD 6 - 20 mg/dL - 46(H) -   CREATININE 0.5 - 1.4 mg/dL - 8.4(H) -   CALCIUM 8.7 - 10.5 mg/dL - 8.8 -   PROTEIN TOTAL 6.0 - 8.4 g/dL - - -   ALBUMIN 3.5 - 5.2 g/dL - - -   BILIRUBIN TOTAL 0.1 - 1.0 mg/dL - - -   ALK PHOS 55 - 135 U/L - - -   AST 10 - 40 U/L - - -   ALT 10 - 44 U/L - - -   ANION GAP 8 - 16 mmol/L - 12 -   EGFR IF AFRICAN AMERICAN >60 mL/min/1.73 m:2 - - -   EGFR IF NON-AFRICAN AMERICAN >60 mL/min/1.73 m:2 - - -     Pulmonary Function Tests 1/25/2023 1/9/2023 10/5/2022 7/6/2022 4/4/2022 3/7/2022 1/12/2022   FVC 1.11 1.27 1.44 1.57 1.29 1.35 1.65   FEV1 0.82 0.89 1.04 1.03 0.85 0.84 1.06   TLC (liters) - - - - - - -   DLCO (ml/mmHg sec) - - - - - - -   FVC% 24 27.5 31 33.8 27.7 29 35.2   FEV1% 23 24.7 28.9 28.5 23.6 23.2 29.3   FEF 25-75 0.57 0.52 0.67 0.53 0.44 0.39 0.54   FEF 25-75% 19.1 17.3 22.4 17.4 14.6 12.8 17.6   TLC% - - - - - - -   RV - - - - - - -   RV% - - - - - - -   DLCO% - - - - - - -         Imaging:  Results for orders placed during the hospital encounter of 01/09/23    X-Ray Chest PA And Lateral    Narrative  EXAMINATION:  XR CHEST PA AND LATERAL    CLINICAL HISTORY:  [facility] 8/22/2017; Lung transplant status    TECHNIQUE:  PA and lateral views of the chest were performed.    COMPARISON:  Chest x-ray dated October 5, 2022    FINDINGS:  Patient is status post median sternotomy in this patient status post bilateral lung transplant.  The trachea and cardiomediastinal silhouette are within normal limits.  When compared to the prior study, there  is no significant interval change.  Fibrotic changes with volume loss and pleural thickening are identified bilaterally.  Osseous structures demonstrate no evidence for acute fractures or dislocations.    Impression  No interval change      Electronically signed by: Sushil Youssef MD  Date:    01/09/2023  Time:    08:28      Assessment:  1. Encounter for aftercare following lung transplant        2. Lung replaced by transplant        3. Immunosuppression        4. Prophylactic antibiotic        5. ESRD (end stage renal disease)        6. Type 2 diabetes mellitus with hyperglycemia, with long-term current use of insulin        7. Chronic respiratory failure with hypoxia and hypercapnia        8. Gastroesophageal reflux disease, unspecified whether esophagitis present                 Plan:     1. FEV1 continue to decline, reflective of CLAD. No concern for infectious etiology. CXR without acute changes. His respiratory symptoms remain stable. Continue to monitor spirometry and imaging at routine visits.     2. Continue tacrolimus 1.5 mg BID, everolimus 0.75 mg BID, prednisone 5 mg daily. Will review tacroimus level and adjust dose as needed. 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 Continue azithromycin MWF.     3. Continue bactrim SS.     4. Continue HD TThS. Continue epo per nephrology. No s/sx of fluid overload.    6. Continue diabetes regimen per endocrinology.     7. Continue oxygen supplementation with exertion. Scheduled to resume pulmonary rehab at the end of this month.     8. RTC in 3 month or sooner if needed.       Teresa Trejo PA-C  Lung Transplant

## 2023-02-02 NOTE — PROGRESS NOTES
Outpatient Care Management  Plan of Care Follow Up Visit    Patient: Martin Hendrix Jr.  MRN: [id number]  Date of Service: 02/02/2023  Completed by: Oneal Decker RN  Referral Date: 09/14/2022    Reason for Visit   Patient presents with    Case Closure    OPCM Chart Review    Update Plan Of Care       Brief Summary: Phone contact made with Mr. Hendrix and we were able to close his CKD care plan. No new issues at this time. I will close his case due to program graduation

## 2023-02-03 PROBLEM — Z99.2 ESRD (END STAGE RENAL DISEASE) ON DIALYSIS: Status: ACTIVE | Noted: 2021-09-14

## 2023-02-13 NOTE — TELEPHONE ENCOUNTER
----- Message from Sylvia Metz sent at 2023 12:25 PM CST -----  Regarding: bactrim rx  Patient states he's out of bactrim, was probably taking wrong. He should still have more according to last dispense, he is asking for a new rx to be sent to the [facility] on his chart please. The rx on file has .    Thank you   Sylvia Negron, Certified Pharmacy Tech [facility]  [phone] phone  [phone] fax

## 2023-02-22 NOTE — PROGRESS NOTES
HPI     Diabetic Eye Exam     Additional comments: Yearly diabetic exam           Comments    Dm since 2017  No sx's  Lab Results       Component                Value               Date                       HGBA1C                   7.8 (H)             10/05/2022                      Last edited by Tacho Pereira, OD on 2/22/2023  9:38 AM.            Assessment /Plan     For exam results, see Encounter Report.    Diabetes mellitus type 2 without retinopathy    Diabetic cataract    Refractive errors      No Background Diabetic Retinopathy    Mild cataracts OU, not surgical.    Dispense Final Rx for glasses.  RTC 1 year  Discussed above and answered questions.

## 2023-03-10 NOTE — TELEPHONE ENCOUNTER
Instructed patient to increase dose and informed him of need for repeat labs.  ----- Message from Samantha Bill DO sent at 3/10/2023  1:39 PM CST -----  Can increase evero to 1.5mg AM and 0.75 mg PM  ----- Message -----  From: Sybil Estrada RN  Sent: 3/10/2023   1:23 PM CST  To: Samantha Bill DO    No, he is on Tac as well.  Tac level same day was 2.3.  ----- Message -----  From: Samantha Bill DO  Sent: 3/10/2023   1:21 PM CST  To: Sybil Estrada RN    Is he only on evero?    ----- Message -----  From: Sybil Estrada RN  Sent: 3/9/2023   2:46 PM CST  To: Samantha Bill DO, Robina Mcdonald MD    Any changes needed?  Has not missed any doses.  Evero dose is 0.75 mg BID.

## 2023-03-14 NOTE — TELEPHONE ENCOUNTER
Discussed patient's medication doses in med review meeting with Dr. Bill.  VORB to increase Tac dose to 2 mg am and 1.5 mg pm.  Instructed patient to do this.  He verbalized understanding and will start the new dose tomorrow.  Labs will be drawn Monday as scheduled.

## 2023-03-20 NOTE — TELEPHONE ENCOUNTER
Pt forgot about lab appointment this morning.  He has HD tomorrow, but can go for labs on Wednesday morning.  Rescheduled.

## 2023-05-03 PROBLEM — D84.9 IMMUNOCOMPROMISED STATE: Status: RESOLVED | Noted: 2021-09-23 | Resolved: 2023-01-01

## 2023-05-03 PROBLEM — R60.0 EXTREMITY EDEMA: Status: RESOLVED | Noted: 2021-05-01 | Resolved: 2023-01-01

## 2023-05-03 PROBLEM — R54 FRAILTY: Status: RESOLVED | Noted: 2021-05-01 | Resolved: 2023-01-01

## 2023-05-03 PROBLEM — R80.9 PROTEINURIA: Status: RESOLVED | Noted: 2020-10-18 | Resolved: 2023-01-01

## 2023-05-03 PROBLEM — E78.2 MIXED HYPERLIPIDEMIA: Status: RESOLVED | Noted: 2018-10-04 | Resolved: 2023-01-01

## 2023-05-03 PROBLEM — Z79.4 LONG TERM CURRENT USE OF INSULIN: Status: RESOLVED | Noted: 2021-09-23 | Resolved: 2023-01-01

## 2023-05-03 PROBLEM — N18.6 ESRD ON HEMODIALYSIS: Status: RESOLVED | Noted: 2021-10-13 | Resolved: 2023-01-01

## 2023-05-03 PROBLEM — B94.8 POST VIRAL DEBILITY: Status: RESOLVED | Noted: 2022-02-16 | Resolved: 2023-01-01

## 2023-05-03 PROBLEM — R53.81 POST VIRAL DEBILITY: Status: RESOLVED | Noted: 2022-02-16 | Resolved: 2023-01-01

## 2023-05-03 PROBLEM — Z99.2 ESRD ON HEMODIALYSIS: Status: RESOLVED | Noted: 2021-10-13 | Resolved: 2023-01-01

## 2023-05-03 PROBLEM — E87.1 HYPONATREMIA: Status: RESOLVED | Noted: 2017-08-27 | Resolved: 2023-01-01

## 2023-05-03 PROBLEM — E78.00 PURE HYPERCHOLESTEROLEMIA: Chronic | Status: RESOLVED | Noted: 2017-11-15 | Resolved: 2023-01-01

## 2023-05-03 PROBLEM — Z48.298 ENCOUNTER FOLLOWING ORGAN TRANSPLANT: Status: RESOLVED | Noted: 2017-12-18 | Resolved: 2023-01-01

## 2023-05-03 NOTE — PROGRESS NOTES
"  Martin Hendrix presented for a  Medicare AWV and comprehensive Health Risk Assessment today. The following components were reviewed and updated:    Medical history  Family History  Social history  Allergies and Current Medications  Health Risk Assessment  Health Maintenance  Care Team     ** See Completed Assessments for Annual Wellness Visit within the encounter summary.**     The following assessments were completed:  Living Situation  CAGE  Depression Screening  Timed Get Up and Go  Whisper Test  Cognitive Function Screening- 3 word recall and clock drawing completed correctly   Nutrition Screening  ADL Screening  PAQ Screening    Vitals:    05/03/23 1101   BP: 124/86   BP Location: Right arm   Pulse: 94   SpO2: 96%   Weight: 88.5 kg (195 lb)   Height: 5' 10" (1.778 m)     Body mass index is 27.98 kg/m².  Physical Exam  Vitals and nursing note reviewed.   Constitutional:       General: He is not in acute distress.     Appearance: He is well-developed. He is not diaphoretic.      Comments: Pleasant, appears comfortable   HENT:      Head: Normocephalic and atraumatic.      Right Ear: External ear normal.      Left Ear: External ear normal.      Nose: Nose normal. No rhinorrhea.   Eyes:      Extraocular Movements: Extraocular movements intact.      Pupils: Pupils are equal, round, and reactive to light.   Cardiovascular:      Rate and Rhythm: Normal rate.      Pulses: Normal pulses.   Pulmonary:      Effort: Pulmonary effort is normal. No respiratory distress.      Comments: On 3L NC, O2 96%  Abdominal:      General: Bowel sounds are normal.      Palpations: Abdomen is soft.   Musculoskeletal:         General: Normal range of motion.      Cervical back: Normal range of motion and neck supple.   Skin:     General: Skin is warm and dry.      Capillary Refill: Capillary refill takes less than 2 seconds.      Findings: No rash.   Neurological:      General: No focal deficit present.      Mental Status: He is alert and " oriented to person, place, and time. Mental status is at baseline.      Cranial Nerves: No cranial nerve deficit.      Motor: No weakness.      Gait: Gait normal.   Psychiatric:         Attention and Perception: He is attentive.         Mood and Affect: Mood normal. Mood is not anxious or depressed. Affect is not labile, blunt, angry or inappropriate.         Speech: He is communicative. Speech is not rapid and pressured, delayed, slurred or tangential.         Behavior: Behavior normal. Behavior is not agitated, slowed, aggressive, withdrawn, hyperactive or combative.         Thought Content: Thought content normal. Thought content is not paranoid or delusional. Thought content does not include homicidal or suicidal ideation. Thought content does not include homicidal or suicidal plan.         Cognition and Memory: Memory is not impaired.         Judgment: Judgment normal. Judgment is not impulsive or inappropriate.         Diagnoses and health risks identified today and associated recommendations/orders:    1. Encounter for Medicare annual wellness exam  Complete history and physical was completed today.  Complete and thorough medication reconciliation was performed.  Discussed risks and benefits of medications.  Advised patient on orders and health maintenance.  We discussed old records and old labs if available.  Will request any records not available through epic.  Continue current medications listed on your summary sheet.  - Ambulatory Referral/Consult to Enhanced Annual Wellness Visit (eAWV)    2. Obstructive sleep apnea syndrome  Chronic. Stable.  Following with pulmonology  Continue current medications and plan of care per PCP and specialists     3. History of pancreatitis  Chronic. Stable.   Continue current medications and plan of care per PCP and specialists     4. Gastroesophageal reflux disease without esophagitis  -symptoms controlled with daily PPI  -denies alarm symptoms, such as dysphagia, weight  loss, chest pain, melena, hematemesis, or N/V  -continue lifestyle modification with avoidance of acidic foods, caffeine, late night eating, spicy foods, and NSAIDs  -Continue current medications and plan of care per PCP and specialists     5. Class 1 obesity due to excess calories with serious comorbidity and body mass index (BMI) of 30.0 to 30.9 in adult  Chronic. Stable.  Encourage increased physical activity, healthy diet choices, and weight loss for prevention of progression of comorbid conditions.     Wt Readings from Last 3 Encounters:   05/03/23 1101 88.5 kg (195 lb)   03/08/23 0911 89 kg (196 lb 3.4 oz)   02/03/23 1346 90.5 kg (199 lb 8.3 oz)     6. Type 2 diabetes mellitus with hyperglycemia, with long-term current use of insulin  Diabetes Medications               insulin aspart U-100 (NOVOLOG FLEXPEN U-100 INSULIN) 100 unit/mL (3 mL) InPn pen Inject 10 Units into the skin 3 (three) times daily with meals.    insulin degludec (TRESIBA FLEXTOUCH U-200) 200 unit/mL (3 mL) insulin pen INJECT 20 UNITS INTO THE SKIN EVERY EVENING   -chronic. Stable.  -see diabetic health maintenance listed below  -counseling provided on importance of diabetic diet and medication compliance in order to treat diabetes  -discussed diabetes disease course and potential complications  -Continue current medications and plan of care per PCP and specialists   -recheck A1C     7. Anemia due to chronic kidney disease, unspecified CKD stage  Chronic. Stable.   Following with nephrology  Continue current medications and plan of care per PCP and specialists   Lab Results   Component Value Date    WBC 9.46 03/08/2023    HGB 13.4 (L) 03/08/2023    HCT 43.7 03/08/2023     (H) 03/08/2023     03/08/2023     8. Sarcoidosis  Chronic. Stable.  S/p lung transplant 2017  Continue current medications and plan of care per PCP and specialists     9. Other cytomegaloviral diseases  Chronic. Stable.  Closely follow with pulmonology and  transplant team  Continue current medications and plan of care per PCP and specialists     10. Chronic pulmonary heart disease  Chronic. Stable.  Continue inhalers   Continue supplemental oxygen  Closely follow with pulmonology and transplant team  Continue current medications and plan of care per PCP and specialists     11. Combined hyperlipidemia associated with type 2 diabetes mellitus  -chronic condition. Currently stable.    -reports compliance with hyperlipidemia treatment as prescribed  -denies any known adverse effects of medications  - Continue current medications and plan of care per PCP and specialists   -recent labs listed below:  Lab Results   Component Value Date    CHOL 218 (H) 01/09/2023     Lab Results   Component Value Date    HDL 46 01/09/2023     Lab Results   Component Value Date    LDLCALC 149.4 01/09/2023     Lab Results   Component Value Date    TRIG 113 01/09/2023     Lab Results   Component Value Date    ALT 15 03/08/2023    AST 15 03/08/2023    ALKPHOS 111 03/08/2023    BILITOT 0.5 03/08/2023     Hyperlipidemia Medications               rosuvastatin (CRESTOR) 10 MG tablet Take 1 tablet (10 mg total) by mouth once daily.     12. Hypertension associated with diabetes  -Chronic. Stable.  -at goal today  -continue lifestyle modification with low sodium diet and exercise   -discussed hypertension disease course and importance of treating high blood pressure  -patient understood and advised of risk of untreated blood pressure.  ER precautions were given for symptoms of hypertensive urgency and emergency.    Hypertension Medications               amLODIPine (NORVASC) 10 MG tablet TAKE 1 TABLET BY MOUTH EVERY DAY    carvediloL (COREG) 3.125 MG tablet TAKE 1 TABLET BY MOUTH TWICE A DAY HOLD IF SBP < 130    metoprolol tartrate (LOPRESSOR) 25 MG tablet Take 1 tablet (25 mg total) by mouth 2 (two) times daily.    cloNIDine (CATAPRES) 0.1 MG tablet Take 1 tablet (0.1 mg total) by mouth 3 (three) times  daily.    furosemide (LASIX) 40 MG tablet One po QDAY     13. Chronic obstructive pulmonary disease, unspecified COPD type  Chronic. Stable.  Continue inhalers   Continue supplemental oxygen  Closely follow with pulmonology  Continue current medications and plan of care per PCP and specialists     14. Paroxysmal atrial fibrillation  Chronic. Stable. (2017)  Currently NSR   No treatment at this time  Continue current medications and plan of care per PCP and specialists     15. Secondary hyperparathyroidism of renal origin  Chronic. Stable.   Following with nephrology  Avoid nephrotoxic medications  Closely monitor renal function  Continue current medications and plan of care per PCP and specialists     Lab Results   Component Value Date    .5 (H) 02/07/2022    CALCIUM 9.7 03/22/2023    CAION 1.09 03/14/2019    PHOS 10.7 (HH) 02/15/2022     16. Immunosuppression  Chronic. Stable.   Long term use of medications s/p liver transplant   Continue current medications and plan of care per PCP and specialists     17. History of severe acute respiratory syndrome coronavirus 2 (SARS-CoV-2) disease  Chronic. Stable.  Continue inhalers   Continue supplemental oxygen  Closely follow with pulmonology and transplant team  Continue current medications and plan of care per PCP and specialists     18. ESRD (end stage renal disease) on dialysis  Chronic. Stable.   Following with nephrology  Avoid nephrotoxic medications  Closely monitor renal function  Continue current medications and plan of care per PCP and specialists     BMP  Lab Results   Component Value Date     03/22/2023    K 4.3 03/22/2023    CL 93 (L) 03/22/2023    CO2 29 03/22/2023    BUN 44 (H) 03/22/2023    CREATININE 10.0 (H) 03/22/2023    CALCIUM 9.7 03/22/2023    ANIONGAP 17 (H) 03/22/2023    EGFRNORACEVR 5.4 (A) 03/22/2023     19. Lung replaced by transplant  Chronic. Stable.  Continue inhalers   Continue supplemental oxygen  Closely follow with pulmonology  and transplant team  Continue current medications and plan of care per PCP and specialists     20. Skin cancer screening  On immunosuppressive medications that increase risk for skin cancer. Recommend eval by dermatology.   -Continue current medications and plan of care per PCP and specialists   - Ambulatory referral/consult to Dermatology; Future    21. Encounter for long-term (current) use of medications  Patient requesting medication refills. Chronic condition has been reviewed and remains stable.   -Continue current medications and plan of care per PCP and specialists   - furosemide (LASIX) 40 MG tablet; One po QDAY  Dispense: 90 tablet; Refill: 1    I offered to discuss end of life issues, including information on how to make advance directives that the patient could use to name someone who would make medical decisions on their behalf if they became too ill to make themselves.    ___Patient declined - already done.    _x__Patient is interested, I provided paperwork and offered to discuss.    Provided Martin with a 5-10 year written screening schedule and personal prevention plan. Recommendations were developed using the USPSTF age appropriate recommendations. Education, counseling, and referrals were provided as needed. After Visit Summary printed and given to patient which includes a list of additional screenings\tests needed.    Follow up in about 3 months (around 8/3/2023), or if symptoms worsen or fail to improve.    Gela Pollack NP

## 2023-06-07 PROBLEM — I70.0 AORTIC ATHEROSCLEROSIS: Status: ACTIVE | Noted: 2023-01-01

## 2023-06-07 NOTE — TELEPHONE ENCOUNTER
Called patient's daughter and answered her questions about yesterday's clinic visit note.  ----- Message from Jarvis Vazquez sent at 6/7/2023  1:56 PM CDT -----  Regarding: call back  Pt's daughter Shannon call to speak with Sybil in regards to the pt's visit he had on today    Call

## 2023-06-07 NOTE — PROGRESS NOTES
LUNG TRANSPLANT RECIPIENT FOLLOW-UP     Reason for Visit: Follow-up after lung transplantation.                               Date of Transplant: 8/22/17     Reason for Transplant: Sarcoidosis with pulmonary hypertension     Type of Transplant: bilateral sequential lung     CMV Status: D+ / R-      Major Complications:   1. Left vocal cord dysfunction   2. 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  A1- 1/2021  6.  Increasing DSA noted in 02/2021- s/p tx in 04/2021 with PLEX/IVIG/MP and ritux   7. 2/2022 COVID infection with residual functional decline                                                                               History of Present Illness: Martin Hendrix Jr. is a 60 y.o. year old male with the above stated history.  He is on 2L of oxygen. He is on no assisted ventilation although he has been diagnosed with KRISTYN and owns a CPAP but does not use it.  His New York Heart Association Class is III and a Karnofsky score of 70% - Cares for self: Unable to carry on normal activity or active work.   He is diabetic insulin dependent.    Patient presents today for routine follow up.  He states that he is overall stable.  Has severe dyspnea with exertion.  States that he quit pulmonary rehab because it was a struggle to just get into the building.  He wears his oxygen as directed.  Is compliant with HD and tolerating well.  Takes all medications as directed.      Review of Systems   Constitutional: Negative for chills, diaphoresis, fever, malaise/fatigue and weight loss.   HENT: Negative for congestion, ear discharge, ear pain, hearing loss, nosebleeds, sinus pain, sore throat and tinnitus.    Eyes: Negative for blurred vision, double vision, photophobia, pain, discharge and redness.   Respiratory: Positive for shortness of breath (with exertion). Negative for  "cough, hemoptysis, sputum production, wheezing and stridor.    Cardiovascular: Negative for chest pain, palpitations, orthopnea, claudication, leg swelling and PND.   Gastrointestinal: Negative for abdominal pain, blood in stool, constipation, diarrhea, heartburn, melena, nausea and vomiting.   Genitourinary: Negative for dysuria, flank pain, frequency, hematuria and urgency.        Decreased urination since kidney failure  Musculoskeletal: Negative for back pain, falls, joint pain, myalgias and neck pain.   Skin: Negative for itching and rash.   Neurological: Negative for dizziness (notes occasional lightheadedness after HD), tingling, tremors, sensory change, speech change, focal weakness, seizures, loss of consciousness, weakness and headaches.   Endo/Heme/Allergies: Negative for environmental allergies and polydipsia. Does not bruise/bleed easily.   Psychiatric/Behavioral: Negative for depression, hallucinations, memory loss, substance abuse and suicidal ideas. The patient is not nervous/anxious.  Positive for insomnia.      BP (!) 146/91   Pulse 104   Temp 97 °F (36.1 °C) (Oral)   Resp 20   Ht 5' 10" (1.778 m)   Wt 87 kg (191 lb 12.8 oz)   SpO2 95% Comment: 3 liters  BMI 27.52 kg/m²     Physical Exam  Vitals and nursing note reviewed.   Constitutional:       General: He is not in acute distress.     Appearance: He is not ill-appearing or diaphoretic.   HENT:      Head: Normocephalic and atraumatic.      Nose: Nose normal.      Mouth/Throat:      Pharynx: No oropharyngeal exudate.   Eyes:      General: No scleral icterus.     Extraocular Movements: Extraocular movements intact.      Conjunctiva/sclera: Conjunctivae normal.   Neck:      Thyroid: No thyromegaly.      Vascular: No JVD.      Trachea: No tracheal deviation.   Cardiovascular:      Rate and Rhythm: Normal rate and regular rhythm.      Heart sounds: Murmur (2/6 systolic murmur best heard at LUSB) heard.   Pulmonary:      Effort: Pulmonary effort " is normal. No respiratory distress.      Breath sounds: No stridor.  No wheezing or rhonchi. Mild rales at the bases.  Chest:      Chest wall: No tenderness.   Abdominal:      General: Bowel sounds are normal. There is no distension.      Palpations: Abdomen is soft. There is no mass.      Tenderness: There is no abdominal tenderness. There is no guarding or rebound.   Musculoskeletal:         General: No tenderness or deformity. Normal range of motion.      Cervical back: Normal range of motion and neck supple.   Lymphadenopathy:      Cervical: No cervical adenopathy.   Skin:     General: Skin is warm and dry.      Coloration: Skin is not pale.      Findings: No erythema or rash.   Neurological:      Mental Status: He is alert and oriented to person, place, and time.      Cranial Nerves: No cranial nerve deficit.      Coordination: Coordination normal.      Gait: Gait is intact.   Psychiatric:         Mood and Affect: Mood and affect normal.         Cognition and Memory: Memory normal.         Judgment: Judgment normal.       Labs:  cbc, cmp, tacrolimus Latest Ref Rng & Units 3/8/2023 3/22/2023 6/7/2023   TACROLIMUS LVL 5.0 - 15.0 ng/mL 2.3(L) 3.2(L) -   WHITE BLOOD CELL COUNT 3.90 - 12.70 K/uL 9.46 - -   RBC 4.60 - 6.20 M/uL 4.37(L) - -   HEMOGLOBIN 14.0 - 18.0 g/dL 13.4(L) - -   HEMATOCRIT 40.0 - 54.0 % 43.7 - -   MCV 82 - 98 fL 100(H) - -   MCH 27.0 - 31.0 pg 30.7 - -   MCHC 32.0 - 36.0 g/dL 30.7(L) - -   RDW 11.5 - 14.5 % 12.7 - -   PLATELETS 150 - 450 K/uL 358 - -   MPV 9.2 - 12.9 fL 8.7(L) - -   GRAN # 1.8 - 7.7 K/uL 6.0 - -   LYMPH # 1.0 - 4.8 K/uL 1.5 - -   MONO # 0.3 - 1.0 K/uL 1.1(H) - -   EOSINOPHIL% 0.0 - 8.0 % 7.2 - -   BASOPHIL% 0.0 - 1.9 % 1.4 - -   DIFFERENTIAL METHOD - Automated - -   SODIUM 136 - 145 mmol/L 140 139 142   POTASSIUM 3.5 - 5.1 mmol/L 4.4 4.3 3.9   CHLORIDE 95 - 110 mmol/L 94(L) 93(L) 97   CO2 23 - 29 mmol/L 31(H) 29 34(H)   GLUCOSE 70 - 110 mg/dL 165(H) 212(H) 145(H)   BUN BLD 6 - 20  mg/dL 36(H) 44(H) 40(H)   CREATININE 0.5 - 1.4 mg/dL 9.8(H) 10.0(H) 9.1(H)   CALCIUM 8.7 - 10.5 mg/dL 9.7 9.7 9.2   PROTEIN TOTAL 6.0 - 8.4 g/dL 8.6(H) - 7.8   ALBUMIN 3.5 - 5.2 g/dL 4.1 - 3.8   BILIRUBIN TOTAL 0.1 - 1.0 mg/dL 0.5 - 0.5   ALK PHOS 55 - 135 U/L 111 - 108   AST 10 - 40 U/L 15 - 16   ALT 10 - 44 U/L 15 - 12   ANION GAP 8 - 16 mmol/L 15 17(H) 11   EGFR IF AFRICAN AMERICAN >60 mL/min/1.73 m:2 - - -   EGFR IF NON-AFRICAN AMERICAN >60 mL/min/1.73 m:2 - - -     Pulmonary Function Tests 6/7/2023 3/8/2023 1/25/2023 1/9/2023 10/5/2022 7/6/2022 4/4/2022   FVC 1.02 1.13 1.11 1.27 1.44 1.57 1.29   FEV1 0.78 0.84 0.82 0.89 1.04 1.03 0.85   TLC (liters) 2.59 - - - - - -   DLCO (ml/mmHg sec) 5.27 - - - - - -   FVC% 22.1 24.3 24 27.5 31 33.8 27.7   FEV1% 21.7 23.3 23 24.7 28.9 28.5 23.6   FEF 25-75 0.58 0.6 0.57 0.52 0.67 0.53 0.44   FEF 25-75% 19.6 20.3 19.1 17.3 22.4 17.4 14.6   TLC% 36.3 - - - - - -   RV - - - - - - -   RV% - - - - - - -   DLCO% 18.1 - - - - - -         Imaging:  Results for orders placed during the hospital encounter of 03/08/23    X-Ray Chest PA And Lateral    Narrative  EXAMINATION:  XR CHEST PA AND LATERAL    CLINICAL HISTORY:  BSLT 8/22/2017; Lung transplant status    TECHNIQUE:  PA and lateral views of the chest were performed.    COMPARISON:  01/09/2023.    FINDINGS:  There are surgical changes present with sternal wires present.  The heart and mediastinal structures appear unchanged.  Multiple calcified hilar and mediastinal lymph nodes are identified as before consistent with prior granulomatous disease.  There are reticular opacities identified within both lungs with decreased lung volumes and there appears to be slow progression of these changes when compared to multiple prior exams.  There is blunting of the costophrenic angles laterally and posteriorly and small pleural effusions cannot be excluded.  There is no evidence for pneumothorax.  Bony structures appear  intact.    Impression  Surgical changes in this patient with history of lung transplantation.    Reticular opacities scattered throughout both lungs with decreased lung volumes.  These findings demonstrate a slow progression when compared to multiple prior studies.    Blunting of the costophrenic angles laterally and posteriorly.  Small pleural effusions cannot be excluded.      Electronically signed by: Luis Felipe Cano MD  Date:    03/08/2023  Time:    08:05        Assessment:  1. Encounter for aftercare following lung transplant        2. Chronic lung allograft dysfunction (CLAD)        3. Immunosuppression        4. Prophylactic antibiotic        5. ESRD (end stage renal disease)        6. Chronic respiratory failure with hypoxia        7. Type 2 diabetes mellitus with hyperglycemia, with long-term current use of insulin        8. Aortic atherosclerosis          Plan:   FEV1 down slightly but overall stable.  Consistent with known CLAD/JONELLE.  CT chest reviewed; has bilateral peripheral fibrosis worse in the apices consistent with JONELLE.  Symptomatically, stable but remains with severe dyspnea.  Recommended pulmonary rehab but he states that he cannot do it as he has difficulty even walking into the building.  Will continue to monitor at routine visits.  Known CLAD/JONELLE.  Continue with supplemental oxygen.  No signs of hypercapnea.  Continue with tac and prednisone.  Follow tac level and adjust if needed.  Azithromycin MWF.  Continue with bactrim.  Continue with HD as scheduled.    Continue with O2 as prescribed.  Currently on 2L.    Continue with endo follow-up for DM.  Has aortic atherosclerosis on CT chest.  Continue with current medications and lifestyle modifications.  Follow-up in 3 months.      Samantha Bill D.O.  Pulmonary/Critical Care and Lung Transplantation  [facility] [place]

## 2023-06-16 NOTE — TELEPHONE ENCOUNTER
Sybil asked if there are any programs that can assist with her father getting a sitter that can help with ADL's, cooking, and running errands.  I explained that the first step would be to call the insurance company to find out if they offer assistance with this type of care.  Explained that if they don't, it would be private pay.  She asked me to check with the  to find out if she was aware of any programs or assistance.  Roxanne Watters states that she is not aware of any programs.  She did mention signing up for Meals on Wheels which is [place] specific.  Contacted Sybil again and explained that our  is not aware of any programs that can help with her request, but she did agree that she should contact the insurance company to find out if they can assist with this.  Also explained that she could try applying for Meals on Wheels.  She verbalized understanding.  They have discussed patient moving to [place] to be near [place], but he prefers to stay in his home.      ----- Message from Jarvis Vazquez sent at 6/16/2023 12:31 PM CDT -----  Regarding: call radha  Pt's daughter Shannon call to speak with Sybil in regards to some question she has    Call

## 2023-07-17 NOTE — TELEPHONE ENCOUNTER
"Received a call from the patient's daughter Sybil, who reported that her father "has not been feeling well" for ~ 1 week. She added "he can't catch his breath" and "he can't do much of anything." When asked, she was unable to provide information about her father's pulse oximetry readings. She was able to report that he had no fever, cough, or exposure to sick contacts. She asked if he could be seen, but added that he has hemodialysis on Tuesday and Thursday. When asked if her father could come for an urgent visit today, she stated she will ask her uncle to bring him. Informed Sybil I will update Dr. Mcdonald and call her back with the provider's recommendations. Sybil verbalized her understanding and agreement with this plan.  Notified Dr. Mcdonald about the patient situation. Received orders to schedule the patient for a chest x-ray, non-fasting labs (CBC, CMP, Mg, CMV PCR) and a clinic visit this afternoon.   Called Sybil Hendrix and explained Dr. Mcdonald's plan. Asked if her father could arrive between 1200 - 1230 for a chest xray at the [facility], followed by lab work on the 2nd floor main building and a visit with Dr. Mcdonald in the 1st floor transplant clinic. Patient stated she will call her father and uncle to update them and coordinate patient's arrival at the time requested. Sybil denied having any other needs or questions at this time.     1203: Received another call from the patient's daughter Sybil Hendrix, who stated her father told her he wants to wait until Wednesday to be seen in clinic. Informed her that I will call her father to assess the situation.  1207: Called patient to discuss his health status. The patient stated his preference not to come to clinic today, as he wants to attend his hemodialysis session tomorrow and come to clinic on Wednesday. Patient verbalized his awareness about his daughter contacting our office, adding "She's overreacting a little." When asked, he " "said he felt he could wait to be seen. When asked, he stated he usually uses Oxygen 2 lpm but recently has needed 4 lpm with exertion. On 4 liters, his pulse ox readings have been in the "low 90s" and he has to "stop and take a break" if he walks outside. He denied having any fever or cough. Informed patient that his appointments will be moved to Wednesday, 7/19 per his request, with [facility] to be followed by fasting labs, spirometry, and a doctor visit in the 1st floor transplant clinic. Instructed patient to call our office or on-call provider, and EMS, should his breathing and/or Oxygen saturations worsen. The patient verbalized his understanding and denied having any questions at this time.  Notified Dr. Mcdonald about the patient situation and she agreed with seeing him in clinic on 7/19, and with the instructions given to patient about calling us and EMS should his condition change.  Called patient's daughter Sybil and updated her about the plan of care. She verbalized her understanding and denied having any questions.   "

## 2023-07-24 NOTE — TELEPHONE ENCOUNTER
Attempted to reach patient at home number, no answer.  Called mobile number.  Pt states he is very short of breath because his home concentrator is not working. Asked what his sat is, he states < 80%.  Advised him to call 911 to be brought to the ED.  Explained that they will have to see if he is requiring more o2 and also get the home concentrator issue figured out.  He states he called the oxygen company last week and someone came out.  They said the machine was fine.  Pt states he has a tank which he will use and he will have his brother bring him to the ED at [place].  Asked him to have them call and speak to our team about treatment plan.  He verbalized understanding.  ----- Message from Lizbeth Nunez sent at 7/24/2023  8:38 AM CDT -----  Regarding: speak to nurse  The patient called requesting to speak to Nurse    No further information provided      Patient can be contacted @# [phone] (home)

## 2023-07-25 PROBLEM — J96.21 ACUTE ON CHRONIC RESPIRATORY FAILURE WITH HYPOXIA: Status: ACTIVE | Noted: 2023-01-01

## 2023-07-25 NOTE — ASSESSMENT & PLAN NOTE
Patient with Hypercapnic and Hypoxic Respiratory failure which is Acute on chronic.  he is on home oxygen at 2 LPM. Supplemental oxygen was provided and noted- Oxygen Concentration (%):  [60] 60  Resp Rate Total:  [24 br/min] 24 br/min    Signs/symptoms of respiratory failure include- tachypnea, increased work of breathing and use of accessory muscles. Contributing diagnoses includes - Pleural effusion, Pneumonia and complications of lung transplant:CLAD/JONELLE Labs and images were reviewed. Patient Has recent ABG, which has been reviewed. Will treat underlying causes and adjust management of respiratory failure as follows-     CXR with impressive right sided pleural effusion    --broad spectrum abx  --continuous bipap  --evaluate for thoracentesis  --RIP and sputum culture pending

## 2023-07-25 NOTE — SUBJECTIVE & OBJECTIVE
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 Peacock    DM (diabetes mellitus) 11/2017     02/22/2021 Insulin x 2017    Hypertension     On home oxygen therapy     KRISTYN on CPAP     Osteopenia     Pancreatitis 2009    hospitalized    Pulmonary hypertension     Pure hypercholesterolemia 11/15/2017    Sarcoidosis     Shortness of breath     Type 2 diabetes mellitus 11/5/2017       Past Surgical History:   Procedure Laterality Date    ABDOMINAL SURGERY      6 weeks old    AV FISTULA PLACEMENT Left 10/13/2021    Procedure: CREATION, AV FISTULA;  Surgeon: CARLI Thomason II, MD;  [facility];  Service: Vascular;  Laterality: Left;    BRONCHOSCOPY      BRONCHOSCOPY N/A 8/22/2018    Procedure: flexible bronchoscopy with tissue biopsy CPT 29225;  Surgeon: [facility] Diagnostic Provider;  [facility];  Service: Endoscopy;  Laterality: N/A;    BRONCHOSCOPY N/A 11/20/2018    Procedure: flexible bronchoscopy with tissue biopy CPT 76725;  Surgeon: [facility] Diagnostic Provider;  [facility];  Service: Anesthesiology;  Laterality: N/A;    BRONCHOSCOPY N/A 11/10/2021    Procedure: Flexible bronchoscopy;  Surgeon: Samantha Bill DO;  [facility];  Service: Endoscopy;  Laterality: N/A;    CHEST TUBE INSERTION      CHOLECYSTECTOMY      COLONOSCOPY      COLONOSCOPY N/A 11/2/2022    Procedure: COLONOSCOPY;  Surgeon: Haritha Hendrix DO;  Location: [facility];  Service: General;  Laterality: N/A;    ESOPHAGOGASTRODUODENOSCOPY N/A 8/6/2018    Procedure: EGD (ESOPHAGOGASTRODUODENOSCOPY);  Surgeon: Ramu Eisenberg MD;  Location: [facility] (2ND FLR);  Service: Endoscopy;  Laterality: N/A;  off pepcid and all acid reducers for 2 weeks; PA > 50     FISTULOGRAM Left 1/10/2022    Procedure: Fistulogram;  Surgeon: CARLI Thomason II, MD;  Location: [facility] CATH LAB;  Service: Vascular;  Laterality: Left;    FISTULOGRAM, WITH PTA Left 1/20/2023    Procedure: LEFT UPPER EXTREMITY FISTULOGRAM WITH PTA  AND BALOON ANGIOPLASTY.;  Surgeon: CARLI Thomason II, MD;  [facility];  Service: Vascular;  Laterality: Left;  mGy:47.78  Fluoro time:6.1  Contarst: 72ML  Gycm2: 11.1273    INSERTION OF TUNNELED CENTRAL VENOUS HEMODIALYSIS CATHETER N/A 3/13/2019    Procedure: INSERTION, CATHETER, CENTRAL VENOUS, HEMODIALYSIS, TUNNELED;  Surgeon: Edy Gonzalez MD;  Location: [facility] CATH LAB;  Service: Nephrology;  Laterality: N/A;    INSERTION OF TUNNELED CENTRAL VENOUS HEMODIALYSIS CATHETER Right 5/7/2021    Procedure: Insertion, Catheter, Central Venous, Hemodialysis;  Surgeon: Mary Joshi MD;  Location: [facility] CATH LAB;  Service: Interventional Nephrology;  Laterality: Right;    LUNG BIOPSY      LUNG TRANSPLANT  08/2017    PERCUTANEOUS TRANSLUMINAL ANGIOPLASTY OF ARTERIOVENOUS FISTULA N/A 1/10/2022    Procedure: PTA, AV FISTULA;  Surgeon: CARLI Thomason II, MD;  Location: [facility] CATH LAB;  Service: Vascular;  Laterality: N/A;    REMOVAL OF TUNNELED CENTRAL VENOUS CATHETER (CVC) N/A 5/16/2019    Procedure: REMOVAL, CATHETER, CENTRAL VENOUS, TUNNELED;  Surgeon: Edy Gonzalez MD;  Location: [facility] CATH LAB;  Service: Nephrology;  Laterality: N/A;    REVISION OF ARTERIOVENOUS FISTULA Left 11/18/2021    Procedure: REVISION, AV FISTULA;  Surgeon: CARLI Thomason II, MD;  Location: [facility] OR [address] [place] [address];  Service: Vascular;  Laterality: Left;  Ligation of side branches    TONSILLECTOMY         Review of patient's allergies indicates:  No Known Allergies    Family History       Problem Relation (Age of Onset)    Blindness Father    Diabetes Father, Brother    Hypertension Mother    Kidney disease Sister          Tobacco Use    Smoking status: Never    Smokeless  tobacco: Never   Substance and Sexual Activity    Alcohol use: No    Drug use: No    Sexual activity: Not on file      Review of Systems   Constitutional:  Positive for chills. Negative for fatigue.   Respiratory:  Positive for shortness of breath. Negative for cough.    Cardiovascular:  Negative for chest pain.   Gastrointestinal:  Negative for abdominal pain and nausea.   Genitourinary:  Negative for difficulty urinating and dysuria.   Objective:     Vital Signs (Most Recent):    Vital Signs (24h Range):  Temp:  [98.3 °F (36.8 °C)-98.5 °F (36.9 °C)] 98.5 °F (36.9 °C)  Pulse:  [87-97] 87  Resp:  [16-24] 16  SpO2:  [98 %-99 %] 98 %  BP: (105-155)/(63-67) 105/63   Weight: 81.5 kg (179 lb 10.8 oz)  Body mass index is 25.78 kg/m².    No intake or output data in the 24 hours ending 07/25/23 1745       Physical Exam  Vitals reviewed.   Constitutional:       General: He is awake.      Appearance: He is well-developed. He is ill-appearing.      Interventions: Nasal cannula in place.   HENT:      Head: Normocephalic and atraumatic.   Cardiovascular:      Rate and Rhythm: Normal rate and regular rhythm.      Heart sounds: Normal heart sounds.   Pulmonary:      Effort: Tachypnea and accessory muscle usage present.      Breath sounds: Examination of the right-upper field reveals rhonchi. Examination of the left-upper field reveals rhonchi. Decreased breath sounds and rhonchi present. No wheezing.   Abdominal:      General: Bowel sounds are normal.      Palpations: Abdomen is soft.      Tenderness: There is no abdominal tenderness.   Musculoskeletal:         General: Normal range of motion.   Skin:     General: Skin is warm and dry.   Neurological:      Mental Status: He is alert and oriented to person, place, and time.   Psychiatric:         Speech: Speech is delayed.          Vents:     Lines/Drains/Airways       Peripheral Intravenous Line  Duration                  Hemodialysis AV Fistula Left forearm -- days                   Significant Labs:    CBC/Anemia Profile:  No results for input(s): WBC, HGB, HCT, PLT, MCV, RDW, IRON, FERRITIN, RETIC, FOLATE, JNFNUETG21, OCCULTBLOOD in the last 48 hours.     Chemistries:  Recent Labs   Lab 07/24/23  1418 07/25/23  0455    140   K 4.0 4.7   CO2 32 33*   BUN 42* 48*   CREATININE 9.91* 10.71*   CALCIUM 9.2 8.5*   ALBUMIN 4.2  --    PROT 8.0*  --    BILITOT 0.4  --    ALKPHOS 127*  --    ALT 12  --    AST 14  --    GLUCOSE 216* 208*       All pertinent labs within the past 24 hours have been reviewed.    Significant Imaging: I have reviewed all pertinent imaging results/findings within the past 24 hours.

## 2023-07-25 NOTE — H&P
[facility] - Cardiac Medical ICU  Critical Care Medicine  History & Physical    Patient Name: Martin Hendrix Jr.  MRN: [id number]  Admission Date: 7/25/2023  Hospital Length of Stay: 0 days  Code Status: Full Code  Attending Physician: Samantha Bill DO   Primary Care Provider: Michel Henley MD   Principal Problem: Acute on chronic respiratory failure with hypoxia    Subjective:     HPI:  Mr. 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 He has felt short of breath for the last week and said he has been going to his dialysis as scheduled. He denied fever, chills, cough, N/V/D. He has been compliant with his anti-rejection meds. Found to have a multifocal pneumonia on CXR; Covid and Trop negative. [place] [facility] [place]- his respiratory status worsened requiring continuous bipap. Arrived at [facility] on 7/25. He received HD at [place] the morning of his transfer.       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 Peacock    DM (diabetes mellitus) 11/2017     02/22/2021 Insulin x 2017    Hypertension     On home oxygen therapy     KRISTYN on CPAP     Osteopenia     Pancreatitis 2009    hospitalized    Pulmonary hypertension     Pure hypercholesterolemia 11/15/2017    Sarcoidosis     Shortness of breath     Type 2 diabetes mellitus 11/5/2017       Past Surgical History:   Procedure Laterality Date    ABDOMINAL SURGERY      6 weeks old    AV FISTULA PLACEMENT Left 10/13/2021    Procedure: CREATION, AV FISTULA;  Surgeon: CARLI Thomason II, MD;  Location: [facility]  OR 2ND FLR;  Service: Vascular;  Laterality: Left;    BRONCHOSCOPY      BRONCHOSCOPY N/A 8/22/2018    Procedure: flexible bronchoscopy with tissue biopsy CPT 85184;  Surgeon: [facility] Diagnostic Provider;  Location: [facility] OR 2ND FLR;  Service: Endoscopy;  Laterality: N/A;    BRONCHOSCOPY N/A 11/20/2018    Procedure: flexible bronchoscopy with tissue biopy CPT 67660;  Surgeon: [facility] Diagnostic Provider;  Location: [facility] OR 2ND FLR;  Service: Anesthesiology;  Laterality: N/A;    BRONCHOSCOPY N/A 11/10/2021    Procedure: Flexible bronchoscopy;  Surgeon: Samantha Bill DO;  Location: [facility] OR [facility] FLR;  Service: Endoscopy;  Laterality: N/A;    CHEST TUBE INSERTION      CHOLECYSTECTOMY      COLONOSCOPY      COLONOSCOPY N/A 11/2/2022    Procedure: COLONOSCOPY;  Surgeon: Haritha Hendrix DO;  Location: [facility];  Service: General;  Laterality: N/A;    ESOPHAGOGASTRODUODENOSCOPY N/A 8/6/2018    Procedure: EGD (ESOPHAGOGASTRODUODENOSCOPY);  Surgeon: Ramu Eisenberg MD;  Location: [facility] (2ND FLR);  Service: Endoscopy;  Laterality: N/A;  off pepcid and all acid reducers for 2 weeks; PA > 50    FISTULOGRAM Left 1/10/2022    Procedure: Fistulogram;  Surgeon: CARLI Thomason II, MD;  Location: [facility] CATH LAB;  Service: Vascular;  Laterality: Left;    FISTULOGRAM, WITH PTA Left 1/20/2023    Procedure: LEFT UPPER EXTREMITY FISTULOGRAM WITH PTA  AND BALOON ANGIOPLASTY.;  Surgeon: CARLI Thomason II, MD;  Location: [facility] OR 2ND FLR;  Service: Vascular;  Laterality: Left;  mGy:47.78  Fluoro time:6.1  Contarst: 72ML  Gycm2: 11.1273    INSERTION OF TUNNELED CENTRAL VENOUS HEMODIALYSIS CATHETER N/A 3/13/2019    Procedure: INSERTION, CATHETER, CENTRAL VENOUS, HEMODIALYSIS, TUNNELED;  Surgeon: Edy Gonzalez MD;  Location: [facility] CATH LAB;  Service: Nephrology;  Laterality: N/A;    INSERTION OF TUNNELED CENTRAL VENOUS HEMODIALYSIS CATHETER Right 5/7/2021    Procedure: Insertion, Catheter, Central Venous,  Hemodialysis;  Surgeon: Mary Joshi MD;  Location: [facility] CATH LAB;  Service: Interventional Nephrology;  Laterality: Right;    LUNG BIOPSY      LUNG TRANSPLANT  08/2017    PERCUTANEOUS TRANSLUMINAL ANGIOPLASTY OF ARTERIOVENOUS FISTULA N/A 1/10/2022    Procedure: PTA, AV FISTULA;  Surgeon: CARLI Thomason II, MD;  Location: [facility] CATH LAB;  Service: Vascular;  Laterality: N/A;    REMOVAL OF TUNNELED CENTRAL VENOUS CATHETER (CVC) N/A 5/16/2019    Procedure: REMOVAL, CATHETER, CENTRAL VENOUS, TUNNELED;  Surgeon: Edy Gonzalez MD;  Location: [facility] CATH LAB;  Service: Nephrology;  Laterality: N/A;    REVISION OF ARTERIOVENOUS FISTULA Left 11/18/2021    Procedure: REVISION, AV FISTULA;  Surgeon: CARLI Thomason II, MD;  Location: [facility] OR [facility] FLR;  Service: Vascular;  Laterality: Left;  Ligation of side branches    TONSILLECTOMY         Review of patient's allergies indicates:  No Known Allergies    Family History       Problem Relation (Age of Onset)    Blindness Father    Diabetes Father, Brother    Hypertension Mother    Kidney disease Sister          Tobacco Use    Smoking status: Never    Smokeless tobacco: Never   Substance and Sexual Activity    Alcohol use: No    Drug use: No    Sexual activity: Not on file      Review of Systems   Constitutional:  Positive for chills. Negative for fatigue.   Respiratory:  Positive for shortness of breath. Negative for cough.    Cardiovascular:  Negative for chest pain.   Gastrointestinal:  Negative for abdominal pain and nausea.   Genitourinary:  Negative for difficulty urinating and dysuria.   Objective:     Vital Signs (Most Recent):    Vital Signs (24h Range):  Temp:  [98.3 °F (36.8 °C)-98.5 °F (36.9 °C)] 98.5 °F (36.9 °C)  Pulse:  [87-97] 87  Resp:  [16-24] 16  SpO2:  [98 %-99 %] 98 %  BP: (105-155)/(63-67) 105/63   Weight: 81.5 kg (179 lb 10.8 oz)  Body mass index is 25.78 kg/m².    No intake or output data in the 24 hours ending 07/25/23 7012        Physical Exam  Vitals reviewed.   Constitutional:       General: He is awake.      Appearance: He is well-developed. He is ill-appearing.      Interventions: Nasal cannula in place.   HENT:      Head: Normocephalic and atraumatic.   Cardiovascular:      Rate and Rhythm: Normal rate and regular rhythm.      Heart sounds: Normal heart sounds.   Pulmonary:      Effort: Tachypnea and accessory muscle usage present.      Breath sounds: Examination of the right-upper field reveals rhonchi. Examination of the left-upper field reveals rhonchi. Decreased breath sounds and rhonchi present. No wheezing.   Abdominal:      General: Bowel sounds are normal.      Palpations: Abdomen is soft.      Tenderness: There is no abdominal tenderness.   Musculoskeletal:         General: Normal range of motion.   Skin:     General: Skin is warm and dry.   Neurological:      Mental Status: He is alert and oriented to person, place, and time.   Psychiatric:         Speech: Speech is delayed.          Vents:     Lines/Drains/Airways       Peripheral Intravenous Line  Duration                  Hemodialysis AV Fistula Left forearm -- days                  Significant Labs:    CBC/Anemia Profile:  No results for input(s): WBC, HGB, HCT, PLT, MCV, RDW, IRON, FERRITIN, RETIC, FOLATE, [id number], OCCULTBLOOD in the last 48 hours.     Chemistries:  Recent Labs   Lab 07/24/23  1418 07/25/23  0455    140   K 4.0 4.7   CO2 32 33*   BUN 42* 48*   CREATININE 9.91* 10.71*   CALCIUM 9.2 8.5*   ALBUMIN 4.2  --    PROT 8.0*  --    BILITOT 0.4  --    ALKPHOS 127*  --    ALT 12  --    AST 14  --    GLUCOSE 216* 208*       All pertinent labs within the past 24 hours have been reviewed.    Significant Imaging: I have reviewed all pertinent imaging results/findings within the past 24 hours.    Assessment/Plan:     Pulmonary  * Acute on chronic respiratory failure with hypoxia  Patient with Hypercapnic and Hypoxic Respiratory failure which is Acute on  chronic.  he is on home oxygen at 2 LPM. Supplemental oxygen was provided and noted- Oxygen Concentration (%):  [60] 60  Resp Rate Total:  [24 br/min] 24 br/min    Signs/symptoms of respiratory failure include- tachypnea, increased work of breathing and use of accessory muscles. Contributing diagnoses includes - Pleural effusion, Pneumonia and complications of lung transplant:CLAD/JONELLE Labs and images were reviewed. Patient Has recent ABG, which has been reviewed. Will treat underlying causes and adjust management of respiratory failure as follows-     CXR with impressive right sided pleural effusion    --broad spectrum abx  --continuous bipap  --evaluate for thoracentesis  --RIP and sputum culture pending    Oxygen dependent  On 2L NC chronically with recent increase due to dyspnea  --See respiratory failure    Complication of lung transplant  Per lung transplant, patient with known CLAD/JONELLE. Dyspnea worsening over the last few months    Lung replaced by transplant  Bilateral lung transplant 8/22/17 2/2 sarcoidosis with pulmonary hypertension  Takes prograf and everolimus.     --lung transplant to take over as primary in the morning  --immunosuppression restarted      Renal/  ESRD (end stage renal disease) on dialysis  Nephrology consulted for management of HD    Endocrine  Type 2 diabetes mellitus with hyperglycemia, with long-term current use of insulin  Hemoglobin A1c 7.4 (2022)  Insulin dependent: 20 units nightly long acting, 10 units TIDWM short acting    --repeat hemoglobin A1c pending  --scheduled insulin at reduced dose  --Moderate dose SSI  --POCT /  Goal glucose 140-180  --diabetic diet  --endocrine consulted per lung transplant      Discussed with Dr. Paz. Daughter updated via telephone.     Critical Care Time: 60 minutes  Critical secondary to Patient has a condition that poses threat to life and bodily function: respiratory failure on continuous bipap     Critical care was time spent personally by  me on the following activities: development of treatment plan with patient or surrogate and bedside caregivers, discussions with consultants, evaluation of patient's response to treatment, examination of patient, ordering and performing treatments and interventions, ordering and review of laboratory studies, ordering and review of radiographic studies, pulse oximetry, re-evaluation of patient's condition. This critical care time did not overlap with that of any other provider or involve time for any procedures.     Tiffany Askew PA-C  Critical Care Medicine  [facility] - Cardiac Medical ICU

## 2023-07-25 NOTE — ASSESSMENT & PLAN NOTE
Hemoglobin A1c 7.4 (2022)  Insulin dependent: 20 units nightly long acting, 10 units TIDWM short acting    --repeat hemoglobin A1c pending  --scheduled insulin at reduced dose  --Moderate dose SSI  --POCT /  Goal glucose 140-180  --diabetic diet  --endocrine consulted per lung transplant

## 2023-07-25 NOTE — PLAN OF CARE
MICU DAILY GOALS     Family/Goals of care/Code Status   Code Status: Full Code    24H Vital Sign Range  Temp:  [95.3 °F (35.2 °C)-98.5 °F (36.9 °C)]   Pulse:  [87-97]   Resp:  [16-40]   BP: (105-155)/(58-67)   SpO2:  [92 %-99 %]      Shift Events   No acute events throughout shift    AWAKE RASS: Goal - RASS Goal: 0-->alert and calm  Actual - RASS (Junior Agitation-Sedation Scale): alert and calm    Restraint necessity: Not necessary   BREATHE SBT: Not intubated    Coordinate A & B, analgesics/sedatives Pain: managed   SAT: Not intubated   Delirium CAM-ICU: Overall CAM-ICU: Positive   Early(intubated/ Progressive (non-intubated) Mobility MOVE Screen: Fail   Activity: Activity Management: Rolling - L1   Feeding/Nutrition Diet order: Diet/Nutrition Received: NPO,     Thrombus DVT prophylaxis:     HOB Elevation Head of Bed (HOB) Positioning: HOB elevated   Ulcer Prophylaxis GI: yes   Glucose control managed     Skin Skin assessed during daily assessment:   Yes, No altered skin integrity present.   Bowel Function no issues    Indwelling Catheter Necessity            De-escalation Antibiotics Yes       VS and assessment per flow sheet, patient progressing towards goals as tolerated, plan of care reviewed with family, all concerns addressed, will continue to monitor.

## 2023-07-25 NOTE — HPI
Mr. 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 He has felt short of breath for the last week and said he has been going to his dialysis as scheduled. He denied fever, chills, cough, N/V/D. He has been compliant with his anti-rejection meds. Found to have a multifocal pneumonia on CXR; Covid and Trop negative. While awaiting transfer to [facility] for transplant care- his respiratory status worsened requiring continuous bipap. Arrived at [facility] MICU on 7/25. He received HD at [place] the morning of his transfer.

## 2023-07-25 NOTE — TELEPHONE ENCOUNTER
Spoke with Sybil and answered the questions that I could regarding her dad's hospitalization.  Pt has not been transferred here yet, awaiting a bed.  She asked about a referral to Palliative Care.  Explained that I would let Dr. Bill know she is requesting this and that if patient is agreeable, a consult can be placed.  Per Dr. Bill, patient is on BiPap and will need an ICU bed which they are waiting on to become available.  Asked Pat Holloway RN to update Sybil as she has information once patient is transferred here.  ----- Message from Jarvis Vazquez sent at 7/25/2023 10:14 AM CDT -----  Regarding: call back  Pt's daughter Sybil in regards to some question she has requesting call back    Call

## 2023-07-25 NOTE — ASSESSMENT & PLAN NOTE
Bilateral lung transplant 8/22/17 2/2 sarcoidosis with pulmonary hypertension  Takes prograf and everolimus.     --lung transplant to take over as primary in the morning  --immunosuppression restarted

## 2023-07-25 NOTE — PROGRESS NOTES
Pharmacist Renal Dose Adjustment Note    Martin Hendrix Jr. is a 62 y.o. male being treated with the medication Zosyn    Patient Data:    Vital Signs (Most Recent):    Vital Signs (72h Range):  Temp:  [98.3 °F (36.8 °C)-98.6 °F (37 °C)]   Pulse:  []   Resp:  [16-26]   BP: (105-155)/(63-88)   SpO2:  [98 %-99 %]      Recent Labs   Lab 07/24/23  1418 07/25/23  0455   CREATININE 9.91* 10.71*     Serum creatinine: 10.71 mg/dL (H) 07/25/23 0455  Estimated creatinine clearance: 7.6 mL/min (A)    Medication:Zosyn dose: 4.5 g frequency q8h will be changed to Zosyn 4.5 g q12h    Pharmacist's Name: Marcia Garay  Pharmacist's Extension: [phone]

## 2023-07-26 PROBLEM — N18.6 ESRD (END STAGE RENAL DISEASE): Status: ACTIVE | Noted: 2023-01-01

## 2023-07-26 PROBLEM — Z94.2 LUNG TRANSPLANTED: Status: ACTIVE | Noted: 2023-01-01

## 2023-07-26 PROBLEM — Z51.5 PALLIATIVE CARE ENCOUNTER: Status: ACTIVE | Noted: 2023-01-01

## 2023-07-26 NOTE — ASSESSMENT & PLAN NOTE
BG goal: 140-180   T2DM.  Hx of lung transplant w/ CLAD. BG stable on admit. Started on Solumedrol 125mg q6 hr.  Unclear if he had been taking insulin at home.  Will monitor on significantly decreased insulin doses from med rec.  Palliative consulted will monitor their conversations.    - Decrease Levemir to 12 units AC  - Decrease Novolog to  4 units AC  - Continue MDC  - POCT Glucose before meals and at bedtime  - Hypoglycemia protocol in place      ** Please notify Endocrine for any change and/or advance in diet**  ** Please call Endocrine for any BG related issues **     Discharge Planning:   TBD. Please notify endocrinology prior to discharge.

## 2023-07-26 NOTE — ASSESSMENT & PLAN NOTE
62M s/p lung tx 2017 presents with SOB and rapidly worsening respiratory status. Started on bipap. Lung tx team suspects presentation is more likely graft rejection than pneumonia. On broad spectrum antibiotics to cover possible pna in immunosuppressed patient.     Plan:  -agree with Vanc/brook/azithro  -f/u CMV, aspergillus, fungitell

## 2023-07-26 NOTE — CONSULTS
[facility] - Cardiac Medical ICU  Endocrinology  Diabetes Consult Note    Consult Requested by: Samantha Bill DO   Reason for admit: Acute on chronic respiratory failure with hypoxia    HISTORY OF PRESENT ILLNESS:  RReason for Consult: Management of T2DM, Hyperglycemia     Surgical Procedure and Date: Double lung transplant in 8/2017      Diabetes diagnosis year: 2017     Home Diabetes Medications:  Tresiba 20 unit HS & Humalog 10u AC  (per med rec)     How often checking glucose at home? CAROLA  BG readings on regimen: CAROLA  Hypoglycemia on the regimen?  No  Missed doses on regimen?  CAROLA     Diabetes Complications include:     Hyperglycemia and Diabetic peripheral neuropathy      Complicating diabetes co morbidities:   KRISTYN and Glucocorticoid use       HPI:   Patient is a 62 y.o. 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 He has felt short of breath for the last week and said he has been going to his dialysis as scheduled. He denied fever, chills, cough, N/V/D. He has been compliant with his anti-rejection meds. Found to have a multifocal pneumonia on CXR; Covid and Trop negative. While awaiting transfer to [facility] [place] care- his respiratory status worsened requiring continuous bipap. Arrived at [facility] MICU on 7/25. He received HD at [place] the morning of his transfer.   Endocrine consulted for bg management.          Interval HPI:   Admitted yesterday.  BG stable at or above goal.  Eating:   <25%  Nausea: No  Hypoglycemia and intervention: No  Fever: No  TPN and/or TF: No    PMH, PSH, FH, SH updated and reviewed     ROS:  Review of Systems   Unable to perform ROS: Acuity of condition     Current Medications and/or Treatments Impacting Glycemic Control  Immunotherapy:    Immunosuppressants           Stop Route Frequency     tacrolimus capsule 1.5 mg         -- Oral Every evening      everolimus (immunosuppressive) tablet 1.5 mg         -- Oral Daily     tacrolimus capsule 2 mg         -- Oral Every morning     everolimus (immunosuppressive) tablet 0.75 mg         -- Oral Nightly          Steroids:   Hormones (From admission, onward)      Start     Stop Route Frequency Ordered    07/26/23 0000  methylPREDNISolone sodium succinate injection 125 mg         -- IV Every 6 hours 07/25/23 2041          Pressors:    Autonomic Drugs (From admission, onward)      None          Hyperglycemia/Diabetes Medications:   Antihyperglycemics (From admission, onward)      Start     Stop Route Frequency Ordered    07/26/23 0715  insulin aspart U-100 pen 5 Units         -- SubQ 3 times daily with meals 07/25/23 1743    07/25/23 2100  insulin detemir U-100 (Levemir) pen 15 Units         -- SubQ Nightly 07/25/23 1743    07/25/23 1840  insulin aspart U-100 pen 1-10 Units         -- SubQ Before meals & nightly PRN 07/25/23 1741             PHYSICAL EXAMINATION:  Vitals:    07/26/23 1545   BP: (!) 182/86   Pulse: 84   Resp:    Temp:      Body mass index is 27.68 kg/m².     Physical Exam  Constitutional:       Appearance: He is ill-appearing.   HENT:      Head: Normocephalic and atraumatic.      Right Ear: External ear normal.      Left Ear: External ear normal.      Nose: Nose normal.   Cardiovascular:      Rate and Rhythm: Normal rate.   Pulmonary:      Comments: On bipap  Abdominal:      General: There is no distension.   Musculoskeletal:         General: No swelling.      Right lower leg: No edema.      Left lower leg: No edema.   Skin:     Findings: No erythema.            Labs Reviewed and Include   Recent Labs   Lab 07/26/23  0427   *   CALCIUM 9.2   ALBUMIN 3.3*   PROT 7.5      K 5.3*   CO2 25   CL 99   BUN 21*   CREATININE 6.8*   ALKPHOS 105   ALT 12   AST 15   BILITOT 0.4     Lab Results   Component Value Date    WBC 7.37 07/26/2023    HGB 9.3 (L) 07/26/2023    HCT 30.9 (L) 07/26/2023     (H)  07/26/2023     07/26/2023     Recent Labs   Lab 07/25/23  1734   TSH 0.834     Lab Results   Component Value Date    HGBA1C 5.6 07/25/2023       Nutritional status:   Body mass index is 27.68 kg/m².  Lab Results   Component Value Date    ALBUMIN 3.3 (L) 07/26/2023    ALBUMIN 3.1 (L) 07/26/2023    ALBUMIN 3.5 07/25/2023     No results found for: PREALBUMIN    Estimated Creatinine Clearance: 11.9 mL/min (A) (based on SCr of 6.8 mg/dL (H)).    Accu-Checks  Recent Labs     07/25/23  1916 07/26/23  0913   POCTGLUCOSE 134* 202*        ASSESSMENT and PLAN    Pulmonary  * Acute on chronic respiratory failure with hypoxia  Managed per primary team  Optimize bg control      Immunology/Multi System  Immunosuppression  On immunosuppressive therapy per transplant team; may elevate BG readings        Endocrine  Type 2 diabetes mellitus with hyperglycemia, with long-term current use of insulin  BG goal: 140-180   T2DM.  Hx of lung transplant w/ CLAD. BG stable on admit. Started on Solumedrol 125mg q6 hr.  Unclear if he had been taking insulin at home.  Will monitor on significantly decreased insulin doses from med rec.  Palliative consulted will monitor their conversations.    - Decrease Levemir to 12 units AC  - Decrease Novolog to  4 units AC  - Continue MDC  - POCT Glucose before meals and at bedtime  - Hypoglycemia protocol in place      ** Please notify Endocrine for any change and/or advance in diet**  ** Please call Endocrine for any BG related issues **     Discharge Planning:   TBD. Please notify endocrinology prior to discharge.            Plan discussed with patient, family, and RN at bedside.     Edwar Marte PA-C  Endocrinology  [facility] - Card[facility] Medical [facility]

## 2023-07-26 NOTE — HPI
RReason for Consult: Management of T2DM, Hyperglycemia     Surgical Procedure and Date: Double lung transplant in 8/2017      Diabetes diagnosis year: 2017     Home Diabetes Medications:  Tresiba 20 unit HS & Humalog 10u AC  (per med rec)     How often checking glucose at home? CAROLA  BG readings on regimen: CAROLA  Hypoglycemia on the regimen?  No  Missed doses on regimen?  CAROLA     Diabetes Complications include:     Hyperglycemia and Diabetic peripheral neuropathy      Complicating diabetes co morbidities:   KRISTYN and Glucocorticoid use       HPI:   Patient is a 62 y.o. 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 He has felt short of breath for the last week and said he has been going to his dialysis as scheduled. He denied fever, chills, cough, N/V/D. He has been compliant with his anti-rejection meds. Found to have a multifocal pneumonia on CXR; Covid and Trop negative. While awaiting transfer to [facility] for transplant care- his respiratory status worsened requiring continuous bipap. Arrived at [facility] MICU on 7/25. He received HD at [place] the morning of his transfer.   Endocrine consulted for bg management.

## 2023-07-26 NOTE — ASSESSMENT & PLAN NOTE
62 y.o. White Male ESRD-HD M-W-F presents to ED on 7/25/2023 with diagnosis of: Bilateral pneumonia [J18.9]   Nephrology consulted for inpatient ESRD-HD management    ESRD on IHD  TTS  -  Last iHD 7/25  Out patient [facility] - [facility]/ Dr. Zenia Peacock.  Duration of outpatient dialysis session - 3.5 hrs  EDW: 87.5 kg   Access: LUE brachiocephalic fistula, created 10/13/2021 by Dr Thomason    Assessment:   - Will provide dialysis today (07/26/2023) with UF -2-3 L as BP tolerates for metabolic clearance and volume management   - Labs reviewed and dialysate to be adjusted to current labs.   - Continue to monitor intake and output  - Please avoid gadolinium, fleets, phos-based laxatives, NSAIDs  - Dialysis thrice weekly unless more urgent indications arise. Will evaluate RRT requirements Daily.    Anemia of ESRD   Recent Labs   Lab 07/25/23  1734 07/26/23  0333   WBC 7.78 7.37   HGB 10.2* 9.3*   HCT 34.3* 30.9*    230     Lab Results   Component Value Date    FESATURATED 17 (L) 03/14/2022    FERRITIN 1,619 (H) 03/14/2022       - Goal in ESRD is Hgb of 10-11. Hgb 9.3  - Will review chronic care plan from outpatient dialysis center for ROGELIO dosing.     Mineral Bone Disease in ESRD   Lab Results   Component Value Date    .5 (H) 02/07/2022    CALCIUM 9.2 07/26/2023    CAION 1.20 07/25/2023    PHOS 5.2 (H) 07/26/2023       - F/U PO4, Mg, Calcium. And albumin levels.   - Renal diet with protein intake goal 1.5 g/kg/d with 1 L fluid restriction when appropriate  - Novasource with meals  - Continue home phos binder when appropriate, phos 5.2  - Daily renal panel so that phos and albumin is monitored daily.

## 2023-07-26 NOTE — PROGRESS NOTES
Admit Note    Met with patient, pt's daughter Sybil, daughter's spouse Gibran and pt's sister Albina, at bedside, to assess needs. Patient is a 62 y.o.  male, admitted for Bilateral pneumonia [J18.9]    Pt is s/p lung txp from 8/22/17.      Patient transferred from [place] on 7/25/2023 .  At this time, patient presents as  resting on Bipap and calm .  At this time, patients caregiver presents as tearful, alert and oriented x 4, good eye contact, concentration/judgement good, calm, communicative, and asking and answering questions appropriately.    Household/Family Systems     Patient resides with patient's  self , at  [address]  [place] [id number].      Support system includes daughter Sybil, brother Williams, and sister Albina.  Patient does have dependents that are in need of being cared for.      Patients primary caregiver is Sybil Teixeira, patients daughter, phone number [phone].  Confirmed patients contact information is [phone] (home);   Telephone Information:   Mobile [phone]     During admission, patient's caregiver plans to stay in patient's room.  Confirmed patient and patients caregivers do have access to reliable transportation.    Cognitive Status/Learning     Patient reports reading ability as 12th grade and states patient does have difficulty with seeing and  wears glasses .  Patient reports patient learns best by Written material/Verbal explanation/Demonstration/ Hands on practice.     Needed: No.   Highest education level: High School (9-12) or GED    Vocation/Disability    Working for Income: no, pt reports he lost his job due to cutbacks related to COVID 19. Pt was working full time as a planner.    Patient was disabled due to Sarcoidosis in 2/2017.    Adherence    Patient reports a high level of adherence to patients health care regimen.  Adherence counseling and education provided. Patient verbalizes understanding.    Substance Use    Patient  reports the following substance usage.    Tobacco: none, patient denies any use.  Alcohol: none, patient denies any use.  Illicit Drugs/Non-prescribed Medications: none, patient denies any use.  Patient states clear understanding of the potential impact of substance use.  Substance abstinence/cessation counseling, education and resources provided and reviewed.     Services Utilizing/ADLS    Infusion Service: Prior to admission, patient utilizing? yes pt reports using [facility] for IV antibiotics and dressing changes.  Home Health: Prior to admission, patient utilizing? In the past w/ [facility]  DME: Prior to admission, yes Pt reports having an IV pole  and oxygen through [facility] ([id number])  Pulmonary/Cardiac Rehab: Prior to admission, yes / [facility] in [place], [place] (ph: [phone], fx: [phone]).   Dialysis:  Prior to admission, yes hemo w/ [facility] Specialty Pharmacy:  Prior to admission, yes:      [facility] [address] [address]  Phone: [phone] Fax: [phone]    [id number] - [place], [place] - [address] [address]  Phone: [phone] Fax: [phone]    Prior to admission, patient reports patient was not independent with ADLS and was not driving.  Patient reports patient is not able to care for self at this time due to compromised medical condition (as documented in medical record) and physical weakness.  Patient indicates a willingness to care for self once medically cleared to do so.    Insurance/Medications    Insured by   Payer/Plan Subscr  Sex Relation Sub. Ins. ID Effective Group Num   1. [employer] MANAGE* JAYE BORDEN ALEC* 1960 Male Self [id number] 20 [id number]                                   [address] [address]   2. [place]* JAYE BORDEN* 1960 Male Self [id number]* 3/1/21 QMB                                   [address]     Primary Insurance (for UNOS reporting): Public Insurance - Medicare & Choice   Secondary Insurance (for UNOS reporting): Medicaid    Patient reports patient is able to obtain and afford medications at this time and at time of discharge.    Living Will/Healthcare Power of     Patient states patient does not have a LW and/or HCPA.   provided education regarding LW and HCPA and the completion of forms.    Coping/Mental Health    Patient is coping adequately with the aid of  family members. Pt's daughter reports pt "may have" had symptoms of depression lately but states pt is not taking medications to manage symptoms at this time.    Discharge Planning    At time of discharge, patient plans to return to patient's home under the care of family.  Patient's family will transport pt at time of discharge.  Per rounds today, expected discharge date has not been medically determined at this time. Patient and patients caregiver verbalize understanding and are involved in treatment planning and discharge process.    Additional Concerns    Patient's caretaker denies additional needs and/or concerns at this time. Patient is being followed for needs, education, resources, information, emotional support, supportive counseling, and for supportive and skilled discharge plan of care.  providing ongoing psychosocial support, education, resources and d/c planning as needed.  SW remains available. Patient denies additional needs and/or concerns at this time. Patient verbalizes understanding and agreement with information reviewed, social work availability, and how to access available resources as needed.   "

## 2023-07-26 NOTE — PROGRESS NOTES
Bedside HD in progress, cannulated left lower arm avf with 15 abdiel needles . /. Uf net goal initially set for 3 liters as tolerated. B/P 190/98, pulse 87, o2 sat on bipap 98%

## 2023-07-26 NOTE — ASSESSMENT & PLAN NOTE
Patient with known CLAD/JONELLE.  Dyspnea has been worsening over the last several months. Patient is a DNR.

## 2023-07-26 NOTE — SUBJECTIVE & OBJECTIVE
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 Peacock    DM (diabetes mellitus) 11/2017     02/22/2021 Insulin x 2017    Hypertension     On home oxygen therapy     KRISTYN on CPAP     Osteopenia     Pancreatitis 2009    hospitalized    Pulmonary hypertension     Pure hypercholesterolemia 11/15/2017    Sarcoidosis     Shortness of breath     Type 2 diabetes mellitus 11/5/2017       Past Surgical History:   Procedure Laterality Date    ABDOMINAL SURGERY      6 weeks old    AV FISTULA PLACEMENT Left 10/13/2021    Procedure: CREATION, AV FISTULA;  Surgeon: CARLI Thomason II, MD;  [facility];  Service: Vascular;  Laterality: Left;    BRONCHOSCOPY      BRONCHOSCOPY N/A 8/22/2018    Procedure: flexible bronchoscopy with tissue biopsy CPT 47497;  Surgeon: [facility] Diagnostic Provider;  [facility];  Service: Endoscopy;  Laterality: N/A;    BRONCHOSCOPY N/A 11/20/2018    Procedure: flexible bronchoscopy with tissue biopy CPT 86917;  Surgeon: [facility] Diagnostic Provider;  [facility];  Service: Anesthesiology;  Laterality: N/A;    BRONCHOSCOPY N/A 11/10/2021    Procedure: Flexible bronchoscopy;  Surgeon: Samantha Bill DO;  [facility];  Service: Endoscopy;  Laterality: N/A;    CHEST TUBE INSERTION      CHOLECYSTECTOMY      COLONOSCOPY      COLONOSCOPY N/A 11/2/2022    Procedure: COLONOSCOPY;  Surgeon: Haritha Hendrix DO;  Location: [facility];  Service: General;  Laterality: N/A;    ESOPHAGOGASTRODUODENOSCOPY N/A 8/6/2018    Procedure: EGD (ESOPHAGOGASTRODUODENOSCOPY);  Surgeon: Ramu Eisenberg MD;  Location: [facility] (2ND FLR);  Service: Endoscopy;  Laterality: N/A;  off pepcid and all acid reducers for 2 weeks; PA > 50     FISTULOGRAM Left 1/10/2022    Procedure: Fistulogram;  Surgeon: CARLI Thomason II, MD;  Location: [facility] C[facility];  Service: Vascular;  Laterality: Left;    FISTULOGRAM, WITH PTA Left 1/20/2023    Procedure: LEFT UPPER EXTREMITY FISTULOGRAM WITH PTA  AND BALOON ANGIOPLASTY.;  Surgeon: CARLI Thomason II, MD;  [facility];  Service: Vascular;  Laterality: Left;  mGy:47.78  Fluoro time:6.1  Contarst: 72ML  Gycm2: 11.1273    INSERTION OF TUNNELED CENTRAL VENOUS HEMODIALYSIS CATHETER N/A 3/13/2019    Procedure: INSERTION, CATHETER, CENTRAL VENOUS, HEMODIALYSIS, TUNNELED;  Surgeon: Edy Gonzalez MD;  Location: [facility] CATH [facility];  Service: Nephrology;  Laterality: N/A;    INSERTION OF TUNNELED CENTRAL VENOUS HEMODIALYSIS CATHETER Right 5/7/2021    Procedure: Insertion, Catheter, Central Venous, Hemodialysis;  Surgeon: Mary Joshi MD;  Location: [facility];  Service: Interventional Nephrology;  Laterality: Right;    LUNG BIOPSY      LUNG TRANSPLANT  08/2017    PERCUTANEOUS TRANSLUMINAL ANGIOPLASTY OF ARTERIOVENOUS FISTULA N/A 1/10/2022    Procedure: PTA, AV FISTULA;  Surgeon: CARLI Thomason II, MD;  Location: [facility];  Service: Vascular;  Laterality: N/A;    REMOVAL OF TUNNELED CENTRAL VENOUS CATHETER (CVC) N/A 5/16/2019    Procedure: REMOVAL, CATHETER, CENTRAL VENOUS, TUNNELED;  Surgeon: Edy Gonzalez MD;  Location: [facility];  Service: Nephrology;  Laterality: N/A;    REVISION OF ARTERIOVENOUS FISTULA Left 11/18/2021    Procedure: REVISION, AV FISTULA;  Surgeon: CARLI Thomason II, MD;  Location: [facility] OR [address] [place] [address];  Service: Vascular;  Laterality: Left;  Ligation of side branches    TONSILLECTOMY         Review of patient's allergies indicates:  No Known Allergies  Current Facility-Administered Medications   Medication Frequency    0.9%  NaCl infusion Once    acetaminophen tablet 1,000 mg Q6H PRN    aspirin EC tablet 81 mg Daily    atorvastatin tablet 20 mg Daily    azithromycin  (ZITHROMAX) 500 mg in dextrose 5 % (D5W) 250 mL IVPB (Vial-Mate) Q24H    calcium-vitamin D3 500 mg-5 mcg (200 unit) per tablet 1 tablet BID    cinacalcet tablet 30 mg Daily with breakfast    dexmedetomidine (PRECEDEX) 400mcg/100mL 0.9% NaCL infusion Continuous    everolimus (immunosuppressive) tablet 0.75 mg QHS    everolimus (immunosuppressive) tablet 1.5 mg Daily    famotidine tablet 20 mg Daily    folic acid tablet 1,000 mcg Daily    glucagon (human recombinant) injection 1 mg PRN    glucose chewable tablet 16 g PRN    glucose chewable tablet 24 g PRN    heparin (porcine) injection 5,000 Units Q8H    insulin aspart U-100 pen 1-10 Units QID (AC + HS) PRN    insulin aspart U-100 pen 5 Units TIDWM    insulin detemir U-100 (Levemir) pen 15 Units QHS    magnesium chloride TBEC tablet TbEC 128 mg BID    meropenem (MERREM) 500 mg in sodium chloride 0.9 % 100 mL IVPB (MB+) Q24H    methylPREDNISolone sodium succinate injection 125 mg Q6H    morphine injection 2 mg Q4H PRN    ondansetron disintegrating tablet 8 mg Q8H PRN    sodium chloride 0.9% flush 10 mL PRN    sulfamethoxazole-trimethoprim 400-80mg per tablet 1 tablet Every Mon, Wed, Fri    tacrolimus capsule 1.5 mg Daily PM    tacrolimus capsule 2 mg Daily AM    vancomycin - pharmacy to dose pharmacy to manage frequency    vitamin D 1000 units tablet 2,000 Units Daily     Facility-Administered Medications Ordered in Other Encounters   Medication Frequency    0.9%  NaCl infusion Continuous    cefazolin (ANCEF) 2 gram in dextrose 5% 50 mL IVPB (premix) On Call Procedure     Family History       Problem Relation (Age of Onset)    Blindness Father    Diabetes Father, Brother    Hypertension Mother    Kidney disease Sister          Tobacco Use    Smoking status: Never    Smokeless tobacco: Never   Substance and Sexual Activity    Alcohol use: No    Drug use: No    Sexual activity: Not on file     Review of Systems   Unable to perform ROS: Acuity of condition   Objective:      Vital Signs (Most Recent):  Temp: 98.6 °F (37 °C) (07/26/23 0701)  Pulse: 87 (07/26/23 0830)  Resp: (!) 32 (07/26/23 0830)  BP: (!) 163/75 (07/26/23 0830)  SpO2: 100 % (07/26/23 0830) Vital Signs (24h Range):  Temp:  [93.9 °F (34.4 °C)-98.6 °F (37 °C)] 98.6 °F (37 °C)  Pulse:  [74-97] 87  Resp:  [18-40] 32  SpO2:  [92 %-100 %] 100 %  BP: ()/() 163/75     Weight: 87.5 kg (192 lb 14.4 oz) (07/26/23 0300)  Body mass index is 27.68 kg/m².  Body surface area is 2.08 meters squared.    I/O last 3 completed shifts:  In: 405.8 [I.V.:125; IV Piggyback:280.8]  Out: 0      Physical Exam  Vitals and nursing note reviewed.   Constitutional:       General: He is not in acute distress.     Appearance: Normal appearance. He is obese. He is ill-appearing.      Comments: +BiPAP   HENT:      Head: Normocephalic and atraumatic.      Right Ear: External ear normal.      Left Ear: External ear normal.      Nose: Nose normal.      Mouth/Throat:      Mouth: Mucous membranes are moist.      Pharynx: Oropharynx is clear.   Eyes:      General: No scleral icterus.     Extraocular Movements: Extraocular movements intact.      Conjunctiva/sclera: Conjunctivae normal.   Cardiovascular:      Rate and Rhythm: Normal rate and regular rhythm.      Heart sounds: Normal heart sounds.      Arteriovenous access: Left arteriovenous access is present.  Pulmonary:      Breath sounds: Rhonchi and rales present. No wheezing.   Abdominal:      General: Abdomen is flat. Bowel sounds are normal. There is no distension.      Palpations: Abdomen is soft.      Tenderness: There is no abdominal tenderness.   Musculoskeletal:         General: No swelling or deformity. Normal range of motion.      Cervical back: Normal range of motion.      Right lower leg: No edema.      Left lower leg: No edema.   Skin:     General: Skin is warm and dry.   Neurological:      General: No focal deficit present.      Mental Status: He is alert.   Psychiatric:          Mood and Affect: Mood normal.        Significant Labs:  CBC:   Recent Labs   Lab 07/26/23  0333   WBC 7.37   RBC 3.09*   HGB 9.3*   HCT 30.9*      *   MCH 30.1   MCHC 30.1*     CMP:   Recent Labs   Lab 07/26/23  0427   *   CALCIUM 9.2   ALBUMIN 3.3*   PROT 7.5      K 5.3*   CO2 25   CL 99   BUN 21*   CREATININE 6.8*   ALKPHOS 105   ALT 12   AST 15   BILITOT 0.4     All labs within the past 24 hours have been reviewed.

## 2023-07-26 NOTE — PROGRESS NOTES
[facility]  Lung Transplant  Progress Note - Critical Care    Patient Name: Martin Hendrix Jr.  MRN: [id number]  Admission Date: 7/25/2023  Hospital Length of Stay: 1 days  Post-Operative Day: 2164  Attending Physician: Samantha Bill DO  Primary Care Provider: Michel Henley MD     Subjective:     Interval History: Remains on Bi-PAP.  Minimal fluid found on US    Continuous Infusions:   dexmedeTOMIDine (Precedex) infusion (titrating) 0.5 mcg/kg/hr (07/26/23 0600)     Scheduled Meds:   sodium chloride 0.9%   Intravenous Once    aspirin  81 mg Oral Daily    atorvastatin  20 mg Oral Daily    azithromycin  500 mg Intravenous Q24H    calcium-vitamin D3  1 tablet Oral BID    cinacalcet  30 mg Oral Daily with breakfast    everolimus (immunosuppressive)  0.75 mg Oral QHS    everolimus (immunosuppressive)  1.5 mg Oral Daily    famotidine  20 mg Oral Daily    folic acid  1,000 mcg Oral Daily    heparin (porcine)  5,000 Units Subcutaneous Q8H    insulin aspart U-100  5 Units Subcutaneous TIDWM    insulin detemir U-100  15 Units Subcutaneous QHS    magnesium chloride  128 mg Oral BID    meropenem (MERREM) IVPB  500 mg Intravenous Q24H    methylPREDNISolone sodium succinate injection  125 mg Intravenous Q6H    sulfamethoxazole-trimethoprim 400-80mg  1 tablet Oral Every Mon, Wed, Fri    tacrolimus  1.5 mg Oral Daily PM    tacrolimus  2 mg Oral Daily AM    vitamin D  2,000 Units Oral Daily     PRN Meds:acetaminophen, glucagon (human recombinant), glucose, glucose, insulin aspart U-100, morphine, ondansetron, sodium chloride 0.9%, Pharmacy to dose Vancomycin consult **AND** vancomycin - pharmacy to dose    Review of patient's allergies indicates:  No Known Allergies    Review of Systems   Respiratory:  Positive for cough, chest tightness, shortness of breath and wheezing.    Gastrointestinal:  Negative for diarrhea, nausea and vomiting.   Allergic/Immunologic: Positive for  immunocompromised state.   Psychiatric/Behavioral:  Positive for confusion (at times). The patient is nervous/anxious (at times).    Objective:        Physical Exam  Vitals reviewed.   Constitutional:       General: He is awake.      Appearance: He is well-developed. He is ill-appearing.      Interventions: Nasal cannula and face mask in place.      Comments: Responds appropriately   HENT:      Head: Normocephalic and atraumatic.   Eyes:      Extraocular Movements: Extraocular movements intact.      Conjunctiva/sclera: Conjunctivae normal.   Cardiovascular:      Rate and Rhythm: Normal rate and regular rhythm.      Heart sounds: Normal heart sounds.   Pulmonary:      Effort: Tachypnea and accessory muscle usage (at times) present. No respiratory distress.      Breath sounds: Examination of the right-upper field reveals rhonchi. Examination of the left-upper field reveals rhonchi. Decreased breath sounds and rhonchi present. No wheezing.   Abdominal:      General: Bowel sounds are normal.      Palpations: Abdomen is soft.      Tenderness: There is no abdominal tenderness.   Musculoskeletal:         General: Normal range of motion.   Skin:     General: Skin is warm and dry.   Neurological:      Mental Status: He is alert, oriented to person, place, and time and easily aroused.   Psychiatric:         Speech: Speech is delayed.              Vital Signs (Most Recent):  Temp: 98.6 °F (37 °C) (07/26/23 0701)  Pulse: 87 (07/26/23 0830)  Resp: (!) 32 (07/26/23 0830)  BP: (!) 163/75 (07/26/23 0830)  SpO2: 100 % (07/26/23 0830) Vital Signs (24h Range):  Temp:  [93.9 °F (34.4 °C)-98.6 °F (37 °C)] 98.6 °F (37 °C)  Pulse:  [74-97] 87  Resp:  [18-40] 32  SpO2:  [92 %-100 %] 100 %  BP: ()/() 163/75     Weight: 87.5 kg (192 lb 14.4 oz)  Body mass index is 27.68 kg/m².      Intake/Output Summary (Last 24 hours) at 7/26/2023 1045  Last data filed at 7/26/2023 0600  Gross per 24 hour   Intake 405.79 ml   Output 0 ml   Net  405.79 ml       Ventilator Data:     Oxygen Concentration (%):  [35-60] 35  Resp Rate Total:  [16 br/min-25 br/min] 25 br/min        Hemodynamic Parameters:       Lines/Drains:       Peripheral IV - Single Lumen 07/25/23 2306 20 G Posterior;Right Hand (Active)   Site Assessment Clean;Dry;Intact;No redness;No swelling 07/26/23 0701   Extremity Assessment Distal to IV No abnormal discoloration;No redness;No swelling;No warmth 07/26/23 0701   Line Status Infusing 07/26/23 0701   Dressing Status Clean;Dry;Intact 07/26/23 0701   Dressing Intervention Integrity maintained 07/26/23 0701   Dressing Change Due 07/29/23 07/26/23 0701   Site Change Due 07/29/23 07/26/23 0701   Reason Not Rotated Not due 07/26/23 0701   Number of days: 0            Peripheral IV - Single Lumen 07/25/23 2335 20 G Right Antecubital (Active)   Site Assessment Clean;Dry;Intact;No redness;No swelling 07/26/23 0701   Extremity Assessment Distal to IV No abnormal discoloration;No redness;No swelling;No warmth 07/26/23 0701   Line Status Flushed;Saline locked 07/26/23 0701   Dressing Status Clean;Dry;New drainage 07/26/23 0701   Dressing Change Due 07/29/23 07/26/23 0701   Site Change Due 07/29/23 07/26/23 0701   Reason Not Rotated Not due 07/26/23 0701   Number of days: 0            Hemodialysis AV Fistula Left forearm (Active)   Needle Size 15ga 02/17/22 1520   Site Assessment Clean;Dry;Intact 07/26/23 0701   Patency Thrill;Bruit;Present 07/26/23 0701   Status Deaccessed 07/26/23 0701   Flows Good 02/17/22 1807   Dressing Intervention First dressing 02/17/22 1807   Dressing Status Old drainage 07/25/23 1701   Site Condition No complications 07/25/23 1701   Dressing Gauze 07/26/23 0701   Number of days:        Significant Labs:  CBC:  Recent Labs   Lab 07/26/23  0333   WBC 7.37   RBC 3.09*   HGB 9.3*   HCT 30.9*      *   MCH 30.1   MCHC 30.1*     BMP:  Recent Labs   Lab 07/25/23  0455 07/25/23  1734 07/26/23  0427   GLUCOSE 208*  --   --        < > 136   K 4.7   < > 5.3*   CL  --    < > 99   CO2 33*   < > 25   BUN 48*   < > 21*   CREATININE 10.71*   < > 6.8*   CALCIUM 8.5*   < > 9.2    < > = values in this interval not displayed.      Tacrolimus Levels:  Recent Labs   Lab 07/26/23  0546   TACROLIMUS 6.8     Microbiology:  Microbiology Results (last 7 days)       Procedure Component Value Units Date/Time    Respiratory Infection Panel (PCR), Nasopharyngeal [[id number]] Collected: 07/25/23 2131    Order Status: Completed Specimen: Nasopharyngeal Swab Updated: 07/26/23 0232     Respiratory Infection Panel Source NP Swab     Adenovirus Not Detected     Coronavirus 229E, Common Cold Virus Not Detected     Coronavirus HKU1, Common Cold Virus Not Detected     Coronavirus NL63, Common Cold Virus Not Detected     Coronavirus OC43, Common Cold Virus Not Detected     Comment: The Coronavirus strains detected in this test cause the common cold.  These strains are not the COVID-19 (novel Coronavirus)strain   associated with the respiratory disease outbreak.          SARS-CoV2 (COVID-19) Qualitative PCR Not Detected     Human Metapneumovirus Not Detected     Human Rhinovirus/Enterovirus Not Detected     Influenza B Not Detected     Parainfluenza Virus 1 Not Detected     Parainfluenza Virus 2 Not Detected     Parainfluenza Virus 3 Not Detected     Parainfluenza Virus 4 Not Detected     Respiratory Syncytial Virus Not Detected     Bordetella Parapertussis (JO2456) Not Detected     Bordetella pertussis (ptxP) Not Detected     Chlamydia pneumoniae Not Detected     Mycoplasma pneumoniae Not Detected    Narrative:      For all other respiratory sources, order [id number] -  Respiratory Viral Panel by PCR    Respiratory Infection Panel (PCR), Nasopharyngeal [[id number]] Collected: 07/25/23 2131    Order Status: Completed Specimen: Nasopharyngeal Swab Updated: 07/25/23 2131     Respiratory Infection Panel Source NP Swab    Narrative:      For all other respiratory  sources, order [id number] -  Respiratory Viral Panel by PCR    Culture, Respiratory with Gram Stain [[id number]]     Order Status: No result Specimen: Respiratory             I have reviewed all pertinent labs within the past 24 hours.    Diagnostic Results:  Chest X-Ray: There are moderate right and small left pleural effusions.  There are worsening interstitial and alveolar opacities in the bilateral lungs.  There is no pneumothorax.           Assessment/Plan:     Pulmonary  * Acute on chronic respiratory failure with hypoxia  Patient with Hypercapnic and Hypoxic Respiratory failure which is Acute on chronic.  he is on home oxygen at 2 LPM. Supplemental oxygen was provided and noted- Oxygen Concentration (%):  [35-60] 35  Resp Rate Total:  [16 br/min-25 br/min] 25 br/min    .   Signs/symptoms of respiratory failure include- tachypnea, increased work of breathing, respiratory distress and use of accessory muscles. Contributing diagnoses includes - Pleural effusion, Pneumonia and complications of lung transplant Labs and images were reviewed. Patient Has recent ABG, which has been reviewed. Will treat underlying causes and adjust management of respiratory failure as follows-     CXR with noted right sided pleural effusion.  Bedside US showed small amount of fluid noted to right side.     --continue broad spectrum antibiotics and ID consult  --Continue Bi-PAP, will wean as tolerated  --RIP negative, follow up on aspergillus antigen, CMP PCR, and fungitell  --Nephrology to remove fluid today    Lung replaced by transplant  Underwent BLT on 8/22/2017 for pulmonary hypertension.  Now with CLAD.  Continue OIP and immunosuppression     Complication of lung transplant  Patient with known CLAD/JONELLE.  Dyspnea has been worsening over the last several months. Patient is a DNR.    Renal/  ESRD (end stage renal disease) on dialysis  Per Nephrology.  Planned for HD today for volume removal    ID  Prophylactic antibiotic  Continue  Bactrim    Immunology/Multi System  Immunosuppression  Continue tacrolimus, everolimus and cortitcosteroid (IV solumedrol 125 mg q6).  Will monitor tacrolimus levels while inpatient    Endocrine  Type 2 diabetes mellitus with hyperglycemia, with long-term current use of insulin  Endocrine consult.  Appreciate recs    Palliative Care  Palliative care encounter  Extensive conversation this morning between Dr. Bill, patient, and patient's family regarding overall prognosis and plan.  Patient is DNR.  Palliative Care consulted.        Preventive Measures: Nutrition: Goal: regular diet, Stress Ulcer: continue prophyllaxis, DVT: continue prophyllaxis, Head of Bet: elevated, Reposition: q2 with assistance    Counseling/Consultation:I discussed the case with Dr. Bill.    Critical Care Time greater than: 45 Minutes     Irwin Celestin NP  Lung Transplant  [facility]

## 2023-07-26 NOTE — SUBJECTIVE & OBJECTIVE
Interval HPI:   Admitted yesterday.  BG stable at or above goal.  Eating:   <25%  Nausea: No  Hypoglycemia and intervention: No  Fever: No  TPN and/or TF: No    PMH, PSH, FH, SH updated and reviewed     ROS:  Review of Systems   Unable to perform ROS: Acuity of condition     Current Medications and/or Treatments Impacting Glycemic Control  Immunotherapy:    Immunosuppressants           Stop Route Frequency     tacrolimus capsule 1.5 mg         -- Oral Every evening     everolimus (immunosuppressive) tablet 1.5 mg         -- Oral Daily     tacrolimus capsule 2 mg         -- Oral Every morning     everolimus (immunosuppressive) tablet 0.75 mg         -- Oral Nightly          Steroids:   Hormones (From admission, onward)      Start     Stop Route Frequency Ordered    07/26/23 0000  methylPREDNISolone sodium succinate injection 125 mg         -- IV Every 6 hours 07/25/23 2041          Pressors:    Autonomic Drugs (From admission, onward)      None          Hyperglycemia/Diabetes Medications:   Antihyperglycemics (From admission, onward)      Start     Stop Route Frequency Ordered    07/26/23 0715  insulin aspart U-100 pen 5 Units         -- SubQ 3 times daily with meals 07/25/23 1743    07/25/23 2100  insulin detemir U-100 (Levemir) pen 15 Units         -- SubQ Nightly 07/25/23 1743    07/25/23 1840  insulin aspart U-100 pen 1-10 Units         -- SubQ Before meals & nightly PRN 07/25/23 1741             PHYSICAL EXAMINATION:  Vitals:    07/26/23 1545   BP: (!) 182/86   Pulse: 84   Resp:    Temp:      Body mass index is 27.68 kg/m².     Physical Exam  Constitutional:       Appearance: He is ill-appearing.   HENT:      Head: Normocephalic and atraumatic.      Right Ear: External ear normal.      Left Ear: External ear normal.      Nose: Nose normal.   Cardiovascular:      Rate and Rhythm: Normal rate.   Pulmonary:      Comments: On bipap  Abdominal:      General: There is no distension.   Musculoskeletal:         General:  No swelling.      Right lower leg: No edema.      Left lower leg: No edema.   Skin:     Findings: No erythema.

## 2023-07-26 NOTE — ASSESSMENT & PLAN NOTE
Continue tacrolimus, everolimus and cortitcosteroid (IV solumedrol 125 mg q6).  Will monitor tacrolimus levels while inpatient

## 2023-07-26 NOTE — PROGRESS NOTES
Pharmacokinetic Assessment Follow Up: IV Vancomycin    Vancomycin serum concentration assessment(s):    The random level was drawn correctly and can be used to guide therapy at this time. The measurement is above the desired definitive target range of 15 to 20 mcg/mL.    Vancomycin Regimen Plan:    Re-dose when the random level is less than 20 mcg/mL, next level to be drawn at approx 0500 on 7/26 with am labs.    Drug levels (last 3 results):  Recent Labs   Lab Result Units 07/25/23  1827 07/26/23  0333   Vancomycin, Random ug/mL 23.5 21.5       Pharmacy will continue to follow and monitor vancomycin.    Please contact pharmacy at extension [phone] for questions regarding this assessment.    Thank you for the consult,   Thony Lyles       Patient brief summary:  Martin Hendrix Jr. is a 62 y.o. male initiated on antimicrobial therapy with IV Vancomycin for treatment of lower respiratory infection      Drug Allergies:   Review of patient's allergies indicates:  No Known Allergies    Actual Body Weight:   87 kg    Renal Function:   Estimated Creatinine Clearance: 11.9 mL/min (A) (based on SCr of 6.8 mg/dL (H)).,     Dialysis Method (if applicable):  intermittent HD, per nephrology    CBC (last 72 hours):  Recent Labs   Lab Result Units 07/25/23  1734 07/26/23  0333   WBC K/uL 7.78 7.37   Hemoglobin g/dL 10.2* 9.3*   Hemoglobin A1C % 5.6  --    Hematocrit % 34.3* 30.9*   Platelets K/uL 233 230   Gran % % 73.1* 90.4*   Lymph % % 8.5* 6.0*   Mono % % 14.1 2.0*   Eosinophil % % 2.2 0.4   Basophil % % 0.8 0.5   Differential Method  Automated Automated       Metabolic Panel (last 72 hours):  Recent Labs   Lab Result Units 07/24/23  1418 07/25/23  0455 07/25/23  1734 07/26/23  0333 07/26/23  0427   Sodium mmol/L 139 140 139 136 136   Potassium mmol/L  --   --  4.6 5.8* 5.3*   Chloride mmol/L 94* 98* 100 101 99   CO2 mmol/L 32 33* 28 22* 25   Glucose mg/dL 216* 208* 127* 158* 176*   BUN mg/dL  --   --  18 21* 21*   Blood Urea  Nitrogen mg/dL 42* 48*  --   --   --    Creatinine mg/dL 9.91* 10.71* 5.6* 6.0* 6.8*   Albumin g/dL 4.2  --  3.5 3.1* 3.3*   Total Bilirubin mg/dL 0.4  --  0.3 0.3 0.4   Alkaline Phosphatase U/L 127*  --  102 87 105   AST U/L 14  --  18 21 15   ALT U/L 12  --  13 14 12   Magnesium mg/dL  --   --   --  2.2  --    Phosphorus mg/dL  --   --  5.9* 5.2*  --        Vancomycin Administrations:  vancomycin given in the last 96 hours        No antibiotic orders with administrations found.                    Microbiologic Results:  Microbiology Results (last 7 days)       Procedure Component Value Units Date/Time    Respiratory Infection Panel (PCR), Nasopharyngeal [[id number]] Collected: 07/25/23 2131    Order Status: Completed Specimen: Nasopharyngeal Swab Updated: 07/26/23 0232     Respiratory Infection Panel Source NP Swab     Adenovirus Not Detected     Coronavirus 229E, Common Cold Virus Not Detected     Coronavirus HKU1, Common Cold Virus Not Detected     Coronavirus NL63, Common Cold Virus Not Detected     Coronavirus OC43, Common Cold Virus Not Detected     Comment: The Coronavirus strains detected in this test cause the common cold.  These strains are not the COVID-19 (novel Coronavirus)strain   associated with the respiratory disease outbreak.          SARS-CoV2 (COVID-19) Qualitative PCR Not Detected     Human Metapneumovirus Not Detected     Human Rhinovirus/Enterovirus Not Detected     Influenza B Not Detected     Parainfluenza Virus 1 Not Detected     Parainfluenza Virus 2 Not Detected     Parainfluenza Virus 3 Not Detected     Parainfluenza Virus 4 Not Detected     Respiratory Syncytial Virus Not Detected     Bordetella Parapertussis (VU0230) Not Detected     Bordetella pertussis (ptxP) Not Detected     Chlamydia pneumoniae Not Detected     Mycoplasma pneumoniae Not Detected    Narrative:      For all other respiratory sources, order [id number] -  Respiratory Viral Panel by PCR    Respiratory Infection Panel  (PCR), Nasopharyngeal [[id number]] Collected: 07/25/23 2131    Order Status: Completed Specimen: Nasopharyngeal Swab Updated: 07/25/23 2131     Respiratory Infection Panel Source NP Swab    Narrative:      For all other respiratory sources, order [id number] -  Respiratory Viral Panel by PCR    Culture, Respiratory with Gram Stain [[id number]]     Order Status: No result Specimen: Respiratory

## 2023-07-26 NOTE — ASSESSMENT & PLAN NOTE
Patient with Hypercapnic and Hypoxic Respiratory failure which is Acute on chronic.  he is on home oxygen at 2 LPM. Supplemental oxygen was provided and noted- Oxygen Concentration (%):  [35-60] 35  Resp Rate Total:  [16 br/min-25 br/min] 25 br/min    .   Signs/symptoms of respiratory failure include- tachypnea, increased work of breathing, respiratory distress and use of accessory muscles. Contributing diagnoses includes - Pleural effusion, Pneumonia and complications of lung transplant Labs and images were reviewed. Patient Has recent ABG, which has been reviewed. Will treat underlying causes and adjust management of respiratory failure as follows-     CXR with noted right sided pleural effusion.  Bedside US showed small amount of fluid noted to right side.     --continue broad spectrum antibiotics and ID consult  --Continue Bi-PAP, will wean as tolerated  --RIP negative, follow up on aspergillus antigen, CMP PCR, and fungitell  --Nephrology to remove fluid today

## 2023-07-26 NOTE — TELEPHONE ENCOUNTER
Pt's daughter had questions regarding patient who stated to her earlier that he did not want to BiPAP.  He was trying to pull it off. She asked what the options are to make her dad more comfortable.  Spoke with Dr. Bill who explained that they are trying to remove more fluid via HD as there were unable to remove an adequate amount of fluid previously due to hi BP not tolerating treatment.  The goal is to remove fluid which will hopefully allow better ventilation/oxygenation.  If that is possible then he may be able to be switched to nasal canula.  Explained that Palliative Care has been consulted and they should see her dad in the morning to start further comfort measures.  Explained that if adequate fluid can not be removed, then full comfort measures may be needed.  She verbalized understanding.  ----- Message from Samantha Em sent at 7/26/2023  4:28 PM CDT -----  Regarding: Patient advice  Contact: Sybil [phone]  Name of Caller:  Sybil     Contact Preference:   [phone]    Nature of Call: Patient daughter requesting a call back to discuss pt care states it is urgent no other details given

## 2023-07-26 NOTE — HPI
"62 year old male with PMHx of ESRD, CHF, HTN, HLD, bilateral lung transplant in 2017 2/2 sarcoidosis and pulmonary htn who presented to OSH on 7/24 for shortness of breath. He has felt short of breath for the last week and said he has been going to his dialysis as scheduled. He denied fever, chills, cough, N/V/D. He has been compliant with his anti-rejection meds. Found to have a multifocal pneumonia on CXR; Covid and Trop negative. While awaiting transfer to [facility] for transplant care- his respiratory status worsened requiring continuous bipap. Arrived at [facility] MICU on 7/25. Further evaluation revealed that patient's condition is most likely due to graft rejection. ID consulted for "Status lung transplant on Immunosuppressive therapy, ? Pneumonia"  "

## 2023-07-26 NOTE — PROGRESS NOTES
Pharmacist Renal Dose Adjustment Note    Martin Hendrix Jr. is a 62 y.o. male being treated with the medication Meropenem    Patient Data:    Vital Signs (Most Recent):  Temp: (!) 93.9 °F (34.4 °C) (07/25/23 1901)  Pulse: 94 (07/25/23 2018)  Resp: (!) 39 (07/25/23 2018)  BP: 123/63 (07/25/23 2018)  SpO2: 97 % (07/25/23 2018) Vital Signs (72h Range):  Temp:  [93.9 °F (34.4 °C)-98.6 °F (37 °C)]   Pulse:  []   Resp:  [16-40]   BP: (105-155)/(58-88)   SpO2:  [92 %-99 %]      Recent Labs   Lab 07/24/23  1418 07/25/23  0455 07/25/23  1734   CREATININE 9.91* 10.71* 5.6*     Serum creatinine: 5.6 mg/dL (H) 07/25/23 1734  Estimated creatinine clearance: 14.5 mL/min (A)    Medication:Meropenem dose: 500mg frequency q12h will be changed to Meropenem 500mg q24h    Pharmacist's Name: Marcia Garay  Pharmacist's Extension: [phone]

## 2023-07-26 NOTE — CONSULTS
"[facility]  Infectious Disease  Consult Note    Patient Name: Martin Hendrix Jr.  MRN: [id number]  Admission Date: 7/25/2023  Hospital Length of Stay: 1 days  Attending Physician: Samantha Bill DO  Primary Care Provider: Michel Henley MD     Isolation Status: No active isolations    Patient information was obtained from patient, past medical records and ER records.      Inpatient consult to Infectious Diseases  Consult performed by: Edin Bello MD  Consult ordered by: Iva Talbot MD      Assessment/Plan:     Pulmonary  * Acute on chronic respiratory failure with hypoxia  62M s/p lung tx 2017 presents with SOB and rapidly worsening respiratory status. Started on bipap. Lung tx team suspects presentation is more likely graft rejection than pneumonia. On broad spectrum antibiotics to cover possible pna in immunosuppressed patient.     Plan:  -agree with Vanc/brook/azithro  -f/u CMV, aspergillus, fungitell         Thank you for your consult. I will follow-up with patient. Please contact us if you have any additional questions.    EDIN BELLO MD  Infectious Disease  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 He has felt short of breath for the last week and said he has been going to his dialysis as scheduled. He denied fever, chills, cough, N/V/D. He has been compliant with his anti-rejection meds. Found to have a multifocal pneumonia on CXR; Covid and Trop negative. While awaiting transfer to [facility] for transplant care- his respiratory status worsened requiring continuous bipap. Arrived at [facility] MICU on 7/25. Further evaluation revealed that patient's condition is most likely due to graft rejection. ID consulted for "Status lung " "transplant on Immunosuppressive therapy, ? 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 Peacock    DM (diabetes mellitus) 11/2017     02/22/2021 Insulin x 2017    Hypertension     On home oxygen therapy     KRISTYN on CPAP     Osteopenia     Pancreatitis 2009    hospitalized    Pulmonary hypertension     Pure hypercholesterolemia 11/15/2017    Sarcoidosis     Shortness of breath     Type 2 diabetes mellitus 11/5/2017       Past Surgical History:   Procedure Laterality Date    ABDOMINAL SURGERY      6 weeks old    AV FISTULA PLACEMENT Left 10/13/2021    Procedure: CREATION, AV FISTULA;  Surgeon: CARLI Thomason II, MD;  [facility];  Service: Vascular;  Laterality: Left;    BRONCHOSCOPY      BRONCHOSCOPY N/A 8/22/2018    Procedure: flexible bronchoscopy with tissue biopsy CPT 00943;  Surgeon: [facility] Provider;  [facility];  Service: Endoscopy;  Laterality: N/A;    BRONCHOSCOPY N/A 11/20/2018    Procedure: flexible bronchoscopy with tissue biopy CPT 91114;  Surgeon: [facility] Diagnostic Provider;  [facility];  Service: Anesthesiology;  Laterality: N/A;    BRONCHOSCOPY N/A 11/10/2021    Procedure: Flexible bronchoscopy;  Surgeon: Samantha Bill DO;  [facility];  Service: Endoscopy;  Laterality: N/A;    CHEST TUBE INSERTION      CHOLECYSTECTOMY      COLONOSCOPY      COLONOSCOPY N/A 11/2/2022    Procedure: COLONOSCOPY;  Surgeon: Haritha Hendrix DO;  Location: [facility] ENDO;  Service: General;  Laterality: N/A;    ESOPHAGOGASTRODUODENOSCOPY N/A 8/6/2018    Procedure: EGD (ESOPHAGOGASTRODUODENOSCOPY);  Surgeon: Ramu Eisenberg MD;  Location: [facility] (2ND FLR);  Service: Endoscopy;  Laterality: N/A;  off " pepcid and all acid reducers for 2 weeks; PA > 50    FISTULOGRAM Left 1/10/2022    Procedure: Fistulogram;  Surgeon: CARLI Thomason II, MD;  Location: [facility] CATH LAB;  Service: Vascular;  Laterality: Left;    FISTULOGRAM, WITH PTA Left 1/20/2023    Procedure: LEFT UPPER EXTREMITY FISTULOGRAM WITH PTA  AND BALOON ANGIOPLASTY.;  Surgeon: CARLI Thomason II, MD;  Location: [facility] OR [facility] FLR;  Service: Vascular;  Laterality: Left;  mGy:47.78  Fluoro time:6.1  Contarst: 72ML  Gycm2: 11.1273    INSERTION OF TUNNELED CENTRAL VENOUS HEMODIALYSIS CATHETER N/A 3/13/2019    Procedure: INSERTION, CATHETER, CENTRAL VENOUS, HEMODIALYSIS, TUNNELED;  Surgeon: Edy Gonzalez MD;  Location: [facility] CATH LAB;  Service: Nephrology;  Laterality: N/A;    INSERTION OF TUNNELED CENTRAL VENOUS HEMODIALYSIS CATHETER Right 5/7/2021    Procedure: Insertion, Catheter, Central Venous, Hemodialysis;  Surgeon: Mary Joshi MD;  Location: [facility] CATH LAB;  Service: Interventional Nephrology;  Laterality: Right;    LUNG BIOPSY      LUNG TRANSPLANT  08/2017    PERCUTANEOUS TRANSLUMINAL ANGIOPLASTY OF ARTERIOVENOUS FISTULA N/A 1/10/2022    Procedure: PTA, AV FISTULA;  Surgeon: CARLI Thomason II, MD;  Location: [facility] CATH LAB;  Service: Vascular;  Laterality: N/A;    REMOVAL OF TUNNELED CENTRAL VENOUS CATHETER (CVC) N/A 5/16/2019    Procedure: REMOVAL, CATHETER, CENTRAL VENOUS, TUNNELED;  Surgeon: Edy Gonzalez MD;  Location: [facility] CATH LAB;  Service: Nephrology;  Laterality: N/A;    REVISION OF ARTERIOVENOUS FISTULA Left 11/18/2021    Procedure: REVISION, AV FISTULA;  Surgeon: CARLI Thomason II, MD;  Location: [facility] OR [facility] FLR;  Service: Vascular;  Laterality: Left;  Ligation of side branches    TONSILLECTOMY         Review of patient's allergies indicates:  No Known Allergies    Medications:  Medications Prior to Admission   Medication Sig    amLODIPine (NORVASC) 10 MG tablet TAKE 1 TABLET BY MOUTH EVERY DAY    azithromycin (Z-REYNA) 250 MG  tablet Take 1 tablet (250 mg total) by mouth every Mon, Wed, Fri.    calcium carbonate-vitamin D3 (CALCIUM 600 + D,3,) 600 mg-5 mcg (200 unit) Cap Take 1 capsule by mouth 2 (two) times a day.    carvediloL (COREG) 3.125 MG tablet TAKE 1 TABLET BY MOUTH TWICE A DAY HOLD IF SBP < 130    cholecalciferol, vitamin D3, (VITAMIN D3) 25 mcg (1,000 unit) capsule Take 2 capsules (2,000 Units total) by mouth once daily.    cinacalcet (SENSIPAR) 30 MG Tab One po QDAY, with largest meal    cloNIDine (CATAPRES) 0.1 MG tablet Take 1 tablet (0.1 mg total) by mouth 3 (three) times daily.    ECOTRIN LOW STRENGTH 81 mg EC tablet TAKE 1 TABLET DAILY    epoetin greer-epbx (RETACRIT) 4,000 unit/mL injection Inject 1.41 mLs (5,640 Units total) into the skin every Tues, Thurs, Sat. Administered by dialysis unit on T/Th/Sat.    everolimus, immunosuppressive, (ZORTRESS) 0.75 mg tablet Take 2 tablets (1.5 mg total) by mouth every morning AND 1 tablet (0.75 mg total) every evening.    famotidine (PEPCID) 20 MG tablet Take 20 mg by mouth once daily.    ferric citrate (AURYXIA) 210 mg iron Tab TAKE 3 TABLETS BY MOUTH 3 TIMES A DAY WITH FOOD    folic acid (FOLVITE) 1 MG tablet TAKE 1 TABLET DAILY    furosemide (LASIX) 40 MG tablet One po QDAY    insulin aspart U-100 (NOVOLOG FLEXPEN U-100 INSULIN) 100 unit/mL (3 mL) InPn pen Inject 10 Units into the skin 3 (three) times daily with meals.    insulin degludec (TRESIBA FLEXTOUCH U-200) 200 unit/mL (3 mL) insulin pen INJECT 20 UNITS INTO THE SKIN EVERY EVENING    lancets Misc To check BG 4 times daily, to use with insurance preferred meter    magnesium chloride (MAG 64) 64 mg TbEC Take 2 tablets (128 mg total) by mouth 2 (two) times daily.    metoprolol tartrate (LOPRESSOR) 25 MG tablet Take 1 tablet (25 mg total) by mouth 2 (two) times daily.    ondansetron (ZOFRAN-ODT) 8 MG TbDL Dissolve 1 tablet (8 mg total) by mouth daily as needed (pre-med for IVIG infusions).    ONETOUCH VERIO Strp USE TO CHECK  "BLOOD GLUCOSE FOUR TIMES A DAY, TO USE WITH INSURANCE PREFERRED METER    pantoprazole (PROTONIX) 40 MG tablet Take 1 tablet (40 mg total) by mouth once daily.    pen needle, diabetic (BD ULTRA-FINE JIM PEN NEEDLE) 32 gauge x 5/32" Ndle Uses 4 times daily, on multiple daily insulin injections    predniSONE (DELTASONE) 5 MG tablet TAKE 1 TABLET BY MOUTH EVERY DAY    pulse oximeter (PULSE OXIMETER) device by Apply Externally route 2 (two) times a day. Use twice daily at 8 AM and 3 PM and [facility] as directed.    rosuvastatin (CRESTOR) 10 MG tablet Take 1 tablet (10 mg total) by mouth once daily.    sulfamethoxazole-trimethoprim 400-80mg (BACTRIM,SEPTRA) 400-80 mg per tablet Take 1 tablet by mouth every Mon, Wed, Fri.    tacrolimus (PROGRAF) 0.5 MG Cap Take 4 capsules (2 mg total) by mouth every morning AND 3 capsules (1.5 mg total) every evening.     Antibiotics (From admission, onward)      Start     Stop Route Frequency Ordered    07/26/23 1700  sulfamethoxazole-trimethoprim 400-80mg per tablet 1 tablet         -- Oral Every Mon, Wed, Fri 07/25/23 1647    07/25/23 2200  meropenem (MERREM) 500 mg in sodium chloride 0.9 % 100 mL IVPB (MB+)         -- IV Every 24 hours (non-standard times) 07/25/23 2045 07/25/23 2100  azithromycin (ZITHROMAX) 500 mg in dextrose 5 % (D5W) 250 mL IVPB (Vial-Mate)         -- IV Every 24 hours (non-standard times) 07/25/23 2040 07/25/23 1833  vancomycin - pharmacy to dose  (vancomycin IVPB (PEDS and ADULTS))        See Hyperspace for full Linked Orders Report.    -- IV pharmacy to manage frequency 07/25/23 1734          Antifungals (From admission, onward)      None          Antivirals (From admission, onward)      None             Immunization History   Administered Date(s) Administered    COVID-19, MRNA, LN-S, PF (MODERNA FULL 0.5 ML DOSE) 03/19/2021, 04/09/2021    COVID-19, MRNA, LN-S, PF (Pfizer) (Purple Cap) 03/19/2021, 03/19/2021, 04/09/2021, 04/09/2021, " 08/26/2021    COVID-19, mRNA, LNP-S, bivalent booster, PF (PFIZER OMICRON) 10/04/2022    Hepatitis A, Adult 04/26/2017, 02/07/2018    Hepatitis B 04/26/2017, 08/10/2017    Hepatitis B, Adult 05/21/2022, 06/23/2022, 07/23/2022, 10/20/2022, 12/06/2022    Hepatitis B, Dialysis, 3 Dose 04/26/2017, 08/10/2017    Influenza 10/09/2018, 09/06/2020, 09/11/2020, 10/19/2021, 09/28/2022    Influenza - Intranasal - Trivalent 09/11/2020    Influenza - Quadrivalent - PF *Preferred* (6 months and older) 10/08/2014, 10/23/2015, 10/26/2016, 12/16/2019, 09/06/2020, 10/19/2021, 09/14/2022    Influenza - Trivalent - PF (ADULT) 03/29/2013    PPD Test 05/03/2021, 05/20/2021, 08/31/2021, 01/20/2022    Pneumococcal 10/09/2020    Pneumococcal Conjugate - 13 Valent 10/09/2020, 12/14/2020    Pneumococcal Polysaccharide - 23 Valent 02/07/2018, 10/09/2020       Family History       Problem Relation (Age of Onset)    Blindness Father    Diabetes Father, Brother    Hypertension Mother    Kidney disease Sister          Social History     Socioeconomic History    Marital status:    Tobacco Use    Smoking status: Never    Smokeless tobacco: Never   Substance and Sexual Activity    Alcohol use: No    Drug use: No     Social Determinants of Health     Financial Resource Strain: Low Risk     Difficulty of Paying Living Expenses: Not hard at all   Food Insecurity: No Food Insecurity    Worried About Running Out of Food in the Last Year: Never true    Ran Out of Food in the Last Year: Never true   Transportation Needs: No Transportation Needs    Lack of Transportation (Medical): No    Lack of Transportation (Non-Medical): No   Physical Activity: Inactive    Days of Exercise per Week: 0 days    Minutes of Exercise per Session: 0 min   Stress: No Stress Concern Present    Feeling of Stress : Not at all   Social Connections: Socially Isolated    Frequency of Communication with Friends and Family: More than three times a week    Frequency of Social  Gatherings with Friends and Family: Once a week    Attends [employer] Services: Never    Active Member of Clubs or Organizations: No    Attends Club or Organization Meetings: Never    Marital Status:    Housing Stability: Low Risk     Unable to Pay for Housing in the Last Year: No    Number of Places Lived in the Last Year: 1    Unstable Housing in the Last Year: No     Review of Systems   Unable to perform ROS: Mental status change   Objective:     Vital Signs (Most Recent):  Temp: 98.6 °F (37 °C) (07/26/23 0701)  Pulse: 87 (07/26/23 0830)  Resp: (!) 32 (07/26/23 0830)  BP: (!) 163/75 (07/26/23 0830)  SpO2: 100 % (07/26/23 0830) Vital Signs (24h Range):  Temp:  [93.9 °F (34.4 °C)-98.6 °F (37 °C)] 98.6 °F (37 °C)  Pulse:  [74-97] 87  Resp:  [18-40] 32  SpO2:  [92 %-100 %] 100 %  BP: ()/() 163/75     Weight: 87.5 kg (192 lb 14.4 oz)  Body mass index is 27.68 kg/m².    Estimated Creatinine Clearance: 11.9 mL/min (A) (based on SCr of 6.8 mg/dL (H)).     Physical Exam  Constitutional:       General: He is not in acute distress.     Appearance: He is ill-appearing.   HENT:      Head: Normocephalic and atraumatic.      Right Ear: External ear normal.      Left Ear: External ear normal.      Nose: Nose normal.      Mouth/Throat:      Mouth: Mucous membranes are moist.      Pharynx: Oropharynx is clear.   Eyes:      General: No scleral icterus.     Extraocular Movements: Extraocular movements intact.      Conjunctiva/sclera: Conjunctivae normal.   Cardiovascular:      Rate and Rhythm: Normal rate and regular rhythm.      Heart sounds: Normal heart sounds.   Pulmonary:      Breath sounds: Rhonchi present. No wheezing or rales.      Comments: On BiPAP  Abdominal:      General: Abdomen is flat. Bowel sounds are normal. There is no distension.      Palpations: Abdomen is soft.      Tenderness: There is no abdominal tenderness.   Musculoskeletal:         General: No swelling. Normal range of motion.       Cervical back: Normal range of motion.   Skin:     General: Skin is warm and dry.        Significant Labs: All pertinent labs within the past 24 hours have been reviewed.    Significant Imaging: I have reviewed all pertinent imaging results/findings within the past 24 hours.

## 2023-07-26 NOTE — CARE UPDATE
Repeat blood gas remains almost thesame with severe respiratory acidosis and hypoxemia.    Bedside US showed no pleural effusion. Findings concerning for chronic rejection with volume loss, and if patient is not a candidate for repeat lung transplant, unlikely to benefit from mechnical ventilator. Case discussed with the attending physician Dr. Samantha Bill, also concerned that patient is rejecting the grafts, he is not a candidiate for repeat lung transplant and unlikely to benefit from mechanical  intubation.    I spoke to the daughter Sybil Hendrix, who discloses that her father wont like to be on mechanical ventilator.     Patient is currently having agitation on the BiPAP, taking off the BiPAP.   We plan to continue the BiPAP and add precedex with as needed morphine for shortness of breath.    The daughter is in agreement with the plan, and no mechanical intubation and no CPR. Daughter is on her way from [place].    Code status updated.     The attending physician updated.      Iva Talbot MD  [employer] PCCM Fellow, [facility]

## 2023-07-26 NOTE — PLAN OF CARE
MICU DAILY GOALS     Family/Goals of care/Code Status   Code Status: DNR    24H Vital Sign Range  Temp:  [93.9 °F (34.4 °C)-99.1 °F (37.3 °C)]   Pulse:  [74-97]   Resp:  [16-39]   BP: ()/()   SpO2:  [81 %-100 %]      Shift Events   Pt remains on Precedex infusion; removed BIPAP intermittently throughout day; pulling at tubing upon initiation of dialysis; redirected; family at bedside.    AWAKE RASS: Goal - RASS Goal: 0-->alert and calm  Actual - RASS (Junior Agitation-Sedation Scale): restless    Restraint necessity: Not necessary   BREATHE SBT: Not intubated    Coordinate A & B, analgesics/sedatives Pain: managed   SAT: Not intubated   Delirium CAM-ICU: Overall CAM-ICU: Positive   Early(intubated/ Progressive (non-intubated) Mobility MOVE Screen: Fail   Activity: Activity Management: Arm raise - L1, Rolling - L1   Feeding/Nutrition Diet order: Diet/Nutrition Received: NPO,     Thrombus DVT prophylaxis: VTE Required Core Measure: Pharmacological prophylaxis initiated/maintained   HOB Elevation Head of Bed (HOB) Positioning: HOB at 30-45 degrees   Ulcer Prophylaxis GI: yes   Glucose control managed     Skin Skin assessed during daily assessment:   Yes, prevention measures documented.   Bowel Function no issues    Indwelling Catheter Necessity         Anuric;Hemodialysis at this time   De-escalation Antibiotics Yes       VS and assessment per flow sheet, patient progressing towards goals as tolerated, plan of care reviewed with patient/family, all concerns addressed, will continue to monitor.

## 2023-07-26 NOTE — ASSESSMENT & PLAN NOTE
Extensive conversation this morning between Dr. Bill, patient, and patient's family regarding overall prognosis and plan.  Patient is DNR.  Palliative Care consulted.

## 2023-07-26 NOTE — PROGRESS NOTES
Patient currently on hemodialysis for removal of uremic toxins and volume control.   I personally saw and examined the patient on hemodialysis.  The patient is tolerating the treatment, see hemodyalisis flow sheet for vitals and assessemts. I also reviewed the chart and current medication and the recent lab work. The dialysis bath was adjusted accordingly.     Volume management/HTN: UF target 2.5 liter if tolerated  Anemia (target 10-12 mg/dl): will start with 6000 units per treatment, or sc after treatment.

## 2023-07-26 NOTE — SUBJECTIVE & OBJECTIVE
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 Peacock    DM (diabetes mellitus) 11/2017     02/22/2021 Insulin x 2017    Hypertension     On home oxygen therapy     KRISTYN on CPAP     Osteopenia     Pancreatitis 2009    hospitalized    Pulmonary hypertension     Pure hypercholesterolemia 11/15/2017    Sarcoidosis     Shortness of breath     Type 2 diabetes mellitus 11/5/2017       Past Surgical History:   Procedure Laterality Date    ABDOMINAL SURGERY      6 weeks old    AV FISTULA PLACEMENT Left 10/13/2021    Procedure: CREATION, AV FISTULA;  Surgeon: CARLI Thomason II, MD;  [facility];  Service: Vascular;  Laterality: Left;    BRONCHOSCOPY      BRONCHOSCOPY N/A 8/22/2018    Procedure: flexible bronchoscopy with tissue biopsy CPT 83749;  Surgeon: [facility];  [facility];  Service: Endoscopy;  Laterality: N/A;    BRONCHOSCOPY N/A 11/20/2018    Procedure: flexible bronchoscopy with tissue biopy CPT 07630;  Surgeon: [facility];  [facility];  Service: Anesthesiology;  Laterality: N/A;    BRONCHOSCOPY N/A 11/10/2021    Procedure: Flexible bronchoscopy;  Surgeon: Samantha Bill DO;  [facility];  Service: Endoscopy;  Laterality: N/A;    CHEST TUBE INSERTION      CHOLECYSTECTOMY      COLONOSCOPY      COLONOSCOPY N/A 11/2/2022    Procedure: COLONOSCOPY;  Surgeon: Haritha Hendrix DO;  Location: [facility];  Service: General;  Laterality: N/A;    ESOPHAGOGASTRODUODENOSCOPY N/A 8/6/2018    Procedure: EGD (ESOPHAGOGASTRODUODENOSCOPY);  Surgeon: Ramu Eisenberg MD;  Location: [facility] (2ND FLR);  Service: Endoscopy;  Laterality: N/A;  off pepcid and all acid reducers for 2 weeks; PA > 50     FISTULOGRAM Left 1/10/2022    Procedure: Fistulogram;  Surgeon: CARLI Thomason II, MD;  Location: [facility] CATH LAB;  Service: Vascular;  Laterality: Left;    FISTULOGRAM, WITH PTA Left 1/20/2023    Procedure: LEFT UPPER EXTREMITY FISTULOGRAM WITH PTA  AND BALOON ANGIOPLASTY.;  Surgeon: CARLI Thomason II, MD;  [facility];  Service: Vascular;  Laterality: Left;  mGy:47.78  Fluoro time:6.1  Contarst: 72ML  Gycm2: 11.1273    INSERTION OF TUNNELED CENTRAL VENOUS HEMODIALYSIS CATHETER N/A 3/13/2019    Procedure: INSERTION, CATHETER, CENTRAL VENOUS, HEMODIALYSIS, TUNNELED;  Surgeon: Edy Gonzalez MD;  Location: [facility] CATH LAB;  Service: Nephrology;  Laterality: N/A;    INSERTION OF TUNNELED CENTRAL VENOUS HEMODIALYSIS CATHETER Right 5/7/2021    Procedure: Insertion, Catheter, Central Venous, Hemodialysis;  Surgeon: Mary Joshi MD;  Location: [facility] CATH LAB;  Service: Interventional Nephrology;  Laterality: Right;    LUNG BIOPSY      LUNG TRANSPLANT  08/2017    PERCUTANEOUS TRANSLUMINAL ANGIOPLASTY OF ARTERIOVENOUS FISTULA N/A 1/10/2022    Procedure: PTA, AV FISTULA;  Surgeon: CARLI Thomason II, MD;  Location: [facility] CATH LAB;  Service: Vascular;  Laterality: N/A;    REMOVAL OF TUNNELED CENTRAL VENOUS CATHETER (CVC) N/A 5/16/2019    Procedure: REMOVAL, CATHETER, CENTRAL VENOUS, TUNNELED;  Surgeon: Edy Gonzalez MD;  Location: [facility] CATH LAB;  Service: Nephrology;  Laterality: N/A;    REVISION OF ARTERIOVENOUS FISTULA Left 11/18/2021    Procedure: REVISION, AV FISTULA;  Surgeon: CARLI Thomason II, MD;  Location: [facility] OR [address] [place] [address];  Service: Vascular;  Laterality: Left;  Ligation of side branches    TONSILLECTOMY         Review of patient's allergies indicates:  No Known Allergies    Medications:  Medications Prior to Admission   Medication Sig    amLODIPine (NORVASC) 10 MG tablet TAKE 1 TABLET BY MOUTH EVERY DAY    azithromycin (Z-REYNA) 250 MG tablet Take 1 tablet (250 mg total) by mouth every  Mon, Wed, Fri.    calcium carbonate-vitamin D3 (CALCIUM 600 + D,3,) 600 mg-5 mcg (200 unit) Cap Take 1 capsule by mouth 2 (two) times a day.    carvediloL (COREG) 3.125 MG tablet TAKE 1 TABLET BY MOUTH TWICE A DAY HOLD IF SBP < 130    cholecalciferol, vitamin D3, (VITAMIN D3) 25 mcg (1,000 unit) capsule Take 2 capsules (2,000 Units total) by mouth once daily.    cinacalcet (SENSIPAR) 30 MG Tab One po QDAY, with largest meal    cloNIDine (CATAPRES) 0.1 MG tablet Take 1 tablet (0.1 mg total) by mouth 3 (three) times daily.    ECOTRIN LOW STRENGTH 81 mg EC tablet TAKE 1 TABLET DAILY    epoetin greer-epbx (RETACRIT) 4,000 unit/mL injection Inject 1.41 mLs (5,640 Units total) into the skin every Tues, Thurs, Sat. Administered by dialysis unit on T/Th/Sat.    everolimus, immunosuppressive, (ZORTRESS) 0.75 mg tablet Take 2 tablets (1.5 mg total) by mouth every morning AND 1 tablet (0.75 mg total) every evening.    famotidine (PEPCID) 20 MG tablet Take 20 mg by mouth once daily.    ferric citrate (AURYXIA) 210 mg iron Tab TAKE 3 TABLETS BY MOUTH 3 TIMES A DAY WITH FOOD    folic acid (FOLVITE) 1 MG tablet TAKE 1 TABLET DAILY    furosemide (LASIX) 40 MG tablet One po QDAY    insulin aspart U-100 (NOVOLOG FLEXPEN U-100 INSULIN) 100 unit/mL (3 mL) InPn pen Inject 10 Units into the skin 3 (three) times daily with meals.    insulin degludec (TRESIBA FLEXTOUCH U-200) 200 unit/mL (3 mL) insulin pen INJECT 20 UNITS INTO THE SKIN EVERY EVENING    lancets Misc To check BG 4 times daily, to use with insurance preferred meter    magnesium chloride (MAG 64) 64 mg TbEC Take 2 tablets (128 mg total) by mouth 2 (two) times daily.    metoprolol tartrate (LOPRESSOR) 25 MG tablet Take 1 tablet (25 mg total) by mouth 2 (two) times daily.    ondansetron (ZOFRAN-ODT) 8 MG TbDL Dissolve 1 tablet (8 mg total) by mouth daily as needed (pre-med for IVIG infusions).    ONETOUCH VERIO Strp USE TO CHECK BLOOD GLUCOSE FOUR TIMES A DAY, TO USE WITH  "INSURANCE PREFERRED METER    pantoprazole (PROTONIX) 40 MG tablet Take 1 tablet (40 mg total) by mouth once daily.    pen needle, diabetic (BD ULTRA-FINE JIM PEN NEEDLE) 32 gauge x 5/32" Ndle Uses 4 times daily, on multiple daily insulin injections    predniSONE (DELTASONE) 5 MG tablet TAKE 1 TABLET BY MOUTH EVERY DAY    pulse oximeter (PULSE OXIMETER) device by Apply Externally route 2 (two) times a day. Use twice daily at 8 AM and 3 PM and record the value in MyChart as directed.    rosuvastatin (CRESTOR) 10 MG tablet Take 1 tablet (10 mg total) by mouth once daily.    sulfamethoxazole-trimethoprim 400-80mg (BACTRIM,SEPTRA) 400-80 mg per tablet Take 1 tablet by mouth every Mon, Wed, Fri.    tacrolimus (PROGRAF) 0.5 MG Cap Take 4 capsules (2 mg total) by mouth every morning AND 3 capsules (1.5 mg total) every evening.     Antibiotics (From admission, onward)      Start     Stop Route Frequency Ordered    07/26/23 1700  sulfamethoxazole-trimethoprim 400-80mg per tablet 1 tablet         -- Oral Every Mon, Wed, Fri 07/25/23 1647    07/25/23 2200  meropenem (MERREM) 500 mg in sodium chloride 0.9 % 100 mL IVPB (MB+)         -- IV Every 24 hours (non-standard times) 07/25/23 2045 07/25/23 2100  azithromycin (ZITHROMAX) 500 mg in dextrose 5 % (D5W) 250 mL IVPB (Vial-Mate)         -- IV Every 24 hours (non-standard times) 07/25/23 2040 07/25/23 1833  vancomycin - pharmacy to dose  (vancomycin IVPB (PEDS and ADULTS))        See Hyperspace for full Linked Orders Report.    -- IV pharmacy to manage frequency 07/25/23 1734          Antifungals (From admission, onward)      None          Antivirals (From admission, onward)      None             Immunization History   Administered Date(s) Administered    COVID-19, MRNA, LN-S, PF (MODERNA FULL 0.5 ML DOSE) 03/19/2021, 04/09/2021    COVID-19, MRNA, LN-S, PF (Pfizer) (Purple Cap) 03/19/2021, 03/19/2021, 04/09/2021, 04/09/2021, 08/26/2021    COVID-19, mRNA, LNP-S, bivalent " booster, PF (PFIZER OMICRON) 10/04/2022    Hepatitis A, Adult 04/26/2017, 02/07/2018    Hepatitis B 04/26/2017, 08/10/2017    Hepatitis B, Adult 05/21/2022, 06/23/2022, 07/23/2022, 10/20/2022, 12/06/2022    Hepatitis B, Dialysis, 3 Dose 04/26/2017, 08/10/2017    Influenza 10/09/2018, 09/06/2020, 09/11/2020, 10/19/2021, 09/28/2022    Influenza - Intranasal - Trivalent 09/11/2020    Influenza - Quadrivalent - PF *Preferred* (6 months and older) 10/08/2014, 10/23/2015, 10/26/2016, 12/16/2019, 09/06/2020, 10/19/2021, 09/14/2022    Influenza - Trivalent - PF (ADULT) 03/29/2013    PPD Test 05/03/2021, 05/20/2021, 08/31/2021, 01/20/2022    Pneumococcal 10/09/2020    Pneumococcal Conjugate - 13 Valent 10/09/2020, 12/14/2020    Pneumococcal Polysaccharide - 23 Valent 02/07/2018, 10/09/2020       Family History       Problem Relation (Age of Onset)    Blindness Father    Diabetes Father, Brother    Hypertension Mother    Kidney disease Sister          Social History     Socioeconomic History    Marital status:    Tobacco Use    Smoking status: Never    Smokeless tobacco: Never   Substance and Sexual Activity    Alcohol use: No    Drug use: No     Social Determinants of Health     Financial Resource Strain: Low Risk     Difficulty of Paying Living Expenses: Not hard at all   Food Insecurity: No Food Insecurity    Worried About Running Out of Food in the Last Year: Never true    Ran Out of Food in the Last Year: Never true   Transportation Needs: No Transportation Needs    Lack of Transportation (Medical): No    Lack of Transportation (Non-Medical): No   Physical Activity: Inactive    Days of Exercise per Week: 0 days    Minutes of Exercise per Session: 0 min   Stress: No Stress Concern Present    Feeling of Stress : Not at all   Social Connections: Socially Isolated    Frequency of Communication with Friends and Family: More than three times a week    Frequency of Social Gatherings with Friends and Family: Once a week     Attends Yazidi Services: Never    Active Member of Clubs or Organizations: No    Attends Club or Organization Meetings: Never    Marital Status:    Housing Stability: Low Risk     Unable to Pay for Housing in the Last Year: No    Number of Places Lived in the Last Year: 1    Unstable Housing in the Last Year: No     Review of Systems   Unable to perform ROS: Mental status change   Objective:     Vital Signs (Most Recent):  Temp: 98.6 °F (37 °C) (07/26/23 0701)  Pulse: 87 (07/26/23 0830)  Resp: (!) 32 (07/26/23 0830)  BP: (!) 163/75 (07/26/23 0830)  SpO2: 100 % (07/26/23 0830) Vital Signs (24h Range):  Temp:  [93.9 °F (34.4 °C)-98.6 °F (37 °C)] 98.6 °F (37 °C)  Pulse:  [74-97] 87  Resp:  [18-40] 32  SpO2:  [92 %-100 %] 100 %  BP: ()/() 163/75     Weight: 87.5 kg (192 lb 14.4 oz)  Body mass index is 27.68 kg/m².    Estimated Creatinine Clearance: 11.9 mL/min (A) (based on SCr of 6.8 mg/dL (H)).     Physical Exam  Constitutional:       General: He is not in acute distress.     Appearance: He is ill-appearing.   HENT:      Head: Normocephalic and atraumatic.      Right Ear: External ear normal.      Left Ear: External ear normal.      Nose: Nose normal.      Mouth/Throat:      Mouth: Mucous membranes are moist.      Pharynx: Oropharynx is clear.   Eyes:      General: No scleral icterus.     Extraocular Movements: Extraocular movements intact.      Conjunctiva/sclera: Conjunctivae normal.   Cardiovascular:      Rate and Rhythm: Normal rate and regular rhythm.      Heart sounds: Normal heart sounds.   Pulmonary:      Breath sounds: Rhonchi present. No wheezing or rales.      Comments: On BiPAP  Abdominal:      General: Abdomen is flat. Bowel sounds are normal. There is no distension.      Palpations: Abdomen is soft.      Tenderness: There is no abdominal tenderness.   Musculoskeletal:         General: No swelling. Normal range of motion.      Cervical back: Normal range of motion.   Skin:      General: Skin is warm and dry.        Significant Labs: All pertinent labs within the past 24 hours have been reviewed.    Significant Imaging: I have reviewed all pertinent imaging results/findings within the past 24 hours.

## 2023-07-26 NOTE — HPI
The patient is a 62 y.o. 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 The patient unable to provide adequate history. Additional history and patient information was obtained from patient's family, past medical records and ER records. Of note, the patient developed significant shortness of breath last despite compliance with HD and anti-rejection medications. In the ED, he was found to a multifocal pneumonia on CXR. Due to his transplant history hew as being transferred to [facility] for transplant care. He unfortunately decompensated requiring continuous BiPAP. Last HD completed on yesterday, 7/25. Transferred to ICU on 7/25. Nephrology consulted for management of ESRD and HD treatment.

## 2023-07-26 NOTE — CONSULTS
[facility]  Nephrology  Consult Note    Patient Name: Martin Hendrix Jr.  MRN: [id number]  Admission Date: 7/25/2023  Hospital Length of Stay: 1 days  Attending Provider: Samantha Bill DO   Primary Care Physician: Michel Henley MD  Principal Problem:Acute on chronic respiratory failure with hypoxia    Inpatient consult to Nephrology  Consult performed by: Cait Finley, GURPREET, FNP-C  Consult ordered by: Samantha Bill DO  Reason for consult: ESRD        Subjective:     HPI: The patient is a 62 y.o. 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 The patient unable to provide adequate history. Additional history and patient information was obtained from patient's family, past medical records and ER records. Of note, the patient developed significant shortness of breath last despite compliance with HD and anti-rejection medications. In the ED, he was found to a multifocal pneumonia on CXR. Due to his transplant history hew as being transferred to [facility] for transplant care. He unfortunately decompensated requiring continuous BiPAP. Last HD completed on yesterday, 7/25. Transferred to ICU on 7/25. Nephrology consulted for management of ESRD and HD treatment.      Past Medical History:   Diagnosis Date    Acute rejection of lung transplant 11/3/2017    AVILA (acute kidney injury) 8/27/2017    Atrial fibrillation 8/23/2017    Chronic obstructive pulmonary disease 9/14/2022    CKD (chronic kidney disease) stage 3, GFR 30-59 ml/min     Dr. Peacock    DM (diabetes mellitus) 11/2017     02/22/2021 Insulin x 2017    Hypertension     On home oxygen therapy     KRISTYN on CPAP     Osteopenia     Pancreatitis 2009    hospitalized    Pulmonary hypertension     Pure  hypercholesterolemia 11/15/2017    Sarcoidosis     Shortness of breath     Type 2 diabetes mellitus 11/5/2017       Past Surgical History:   Procedure Laterality Date    ABDOMINAL SURGERY      6 weeks old    AV FISTULA PLACEMENT Left 10/13/2021    Procedure: CREATION, AV FISTULA;  Surgeon: CARLI Thomason II, MD;  [facility];  Service: Vascular;  Laterality: Left;    BRONCHOSCOPY      BRONCHOSCOPY N/A 8/22/2018    Procedure: flexible bronchoscopy with tissue biopsy CPT 20249;  Surgeon: [facility] Diagnostic Provider;  [facility];  Service: Endoscopy;  Laterality: N/A;    BRONCHOSCOPY N/A 11/20/2018    Procedure: flexible bronchoscopy with tissue biopy CPT 80421;  Surgeon: [facility] Diagnostic Provider;  [facility] [facility];  Service: Anesthesiology;  Laterality: N/A;    BRONCHOSCOPY N/A 11/10/2021    Procedure: Flexible bronchoscopy;  Surgeon: Samantha Bill DO;  [facility];  Service: Endoscopy;  Laterality: N/A;    CHEST TUBE INSERTION      CHOLECYSTECTOMY      COLONOSCOPY      COLONOSCOPY N/A 11/2/2022    Procedure: COLONOSCOPY;  Surgeon: Haritha Hendrix DO;  Location: [facility] ENDO;  Service: General;  Laterality: N/A;    ESOPHAGOGASTRODUODENOSCOPY N/A 8/6/2018    Procedure: EGD (ESOPHAGOGASTRODUODENOSCOPY);  Surgeon: Ramu Eisenberg MD;  Location: [facility] ([facility]);  Service: Endoscopy;  Laterality: N/A;  off pepcid and all acid reducers for 2 weeks; PA > 50    FISTULOGRAM Left 1/10/2022    Procedure: Fistulogram;  Surgeon: CARLI Thomason II, MD;  [place] [facility];  Service: Vascular;  Laterality: Left;    FISTULOGRAM, WITH PTA Left 1/20/2023    Procedure: LEFT UPPER EXTREMITY FISTULOGRAM WITH PTA  AND BALOON ANGIOPLASTY.;  Surgeon: CARLI Thomason II, MD;  [facility] [facility];  Service: Vascular;  Laterality: Left;  mGy:47.78  Fluoro time:6.1  Contarst: 72ML  Gycm2: 11.1273    INSERTION OF TUNNELED CENTRAL VENOUS HEMODIALYSIS  CATHETER N/A 3/13/2019    Procedure: INSERTION, CATHETER, CENTRAL VENOUS, HEMODIALYSIS, TUNNELED;  Surgeon: Edy Gonzalez MD;  [facility] C[facility] LAB;  Service: Nephrology;  Laterality: N/A;    INSERTION OF TUNNELED CENTRAL VENOUS HEMODIALYSIS CATHETER Right 5/7/2021    Procedure: Insertion, Catheter, Central Venous, Hemodialysis;  Surgeon: Mary Joshi MD;  [facility]: [facility] CATH LAB;  Service: Interventional Nephrology;  Laterality: Right;    LUNG BIOPSY      LUNG TRANSPLANT  08/2017    PERCUTANEOUS TRANSLUMINAL ANGIOPLASTY OF ARTERIOVENOUS FISTULA N/A 1/10/2022    Procedure: PTA, AV FISTULA;  Surgeon: CARLI Thomason II, MD;  [facility]: [facility] C[facility] LAB;  Service: Vascular;  Laterality: N/A;    REMOVAL OF TUNNELED CENTRAL VENOUS CATHETER (CVC) N/A 5/16/2019    Procedure: REMOVAL, CATHETER, CENTRAL VENOUS, TUNNELED;  Surgeon: Edy Gonzalez MD;  Location: [facility] CATH LAB;  Service: Nephrology;  Laterality: N/A;    REVISION OF ARTERIOVENOUS FISTULA Left 11/18/2021    Procedure: REVISION, AV FISTULA;  Surgeon: CARLI Thomason II, MD;  [facility];  Service: Vascular;  Laterality: Left;  Ligation of side branches    TONSILLECTOMY         Review of patient's allergies indicates:  No Known Allergies  Current Facility-Administered Medications   Medication Frequency    0.9%  NaCl infusion Once    acetaminophen tablet 1,000 mg Q6H PRN    aspirin EC tablet 81 mg Daily    atorvastatin tablet 20 mg Daily    azithromycin (ZITHROMAX) 500 mg in dextrose 5 % (D5W) 250 mL IVPB (Vial-Mate) Q24H    calcium-vitamin D3 500 mg-5 mcg (200 unit) per tablet 1 tablet BID    cinacalcet tablet 30 mg Daily with breakfast    dexmedetomidine (PRECEDEX) 400mcg/100mL 0.9% NaCL infusion Continuous    everolimus (immunosuppressive) tablet 0.75 mg QHS    everolimus (immunosuppressive) tablet 1.5 mg Daily    famotidine tablet 20 mg Daily    folic acid tablet 1,000 mcg Daily    glucagon (human recombinant)  injection 1 mg PRN    glucose chewable tablet 16 g PRN    glucose chewable tablet 24 g PRN    heparin (porcine) injection 5,000 Units Q8H    insulin aspart U-100 pen 1-10 Units QID (AC + HS) PRN    insulin aspart U-100 pen 5 Units TIDWM    insulin detemir U-100 (Levemir) pen 15 Units QHS    magnesium chloride TBEC tablet TbEC 128 mg BID    meropenem (MERREM) 500 mg in sodium chloride 0.9 % 100 mL IVPB (MB+) Q24H    methylPREDNISolone sodium succinate injection 125 mg Q6H    morphine injection 2 mg Q4H PRN    ondansetron disintegrating tablet 8 mg Q8H PRN    sodium chloride 0.9% flush 10 mL PRN    sulfamethoxazole-trimethoprim 400-80mg per tablet 1 tablet Every Mon, Wed, Fri    tacrolimus capsule 1.5 mg Daily PM    tacrolimus capsule 2 mg Daily AM    vancomycin - pharmacy to dose pharmacy to manage frequency    vitamin D 1000 units tablet 2,000 Units Daily     Facility-Administered Medications Ordered in Other Encounters   Medication Frequency    0.9%  NaCl infusion Continuous    cefazolin (ANCEF) 2 gram in dextrose 5% 50 mL IVPB (premix) On Call Procedure     Family History       Problem Relation (Age of Onset)    Blindness Father    Diabetes Father, Brother    Hypertension Mother    Kidney disease Sister          Tobacco Use    Smoking status: Never    Smokeless tobacco: Never   Substance and Sexual Activity    Alcohol use: No    Drug use: No    Sexual activity: Not on file     Review of Systems   Unable to perform ROS: Acuity of condition   Objective:     Vital Signs (Most Recent):  Temp: 98.6 °F (37 °C) (07/26/23 0701)  Pulse: 87 (07/26/23 0830)  Resp: (!) 32 (07/26/23 0830)  BP: (!) 163/75 (07/26/23 0830)  SpO2: 100 % (07/26/23 0830) Vital Signs (24h Range):  Temp:  [93.9 °F (34.4 °C)-98.6 °F (37 °C)] 98.6 °F (37 °C)  Pulse:  [74-97] 87  Resp:  [18-40] 32  SpO2:  [92 %-100 %] 100 %  BP: ()/() 163/75     Weight: 87.5 kg (192 lb 14.4 oz) (07/26/23 0300)  Body mass index is 27.68  kg/m².  Body surface area is 2.08 meters squared.    I/O last 3 completed shifts:  In: 405.8 [I.V.:125; IV Piggyback:280.8]  Out: 0      Physical Exam  Vitals and nursing note reviewed.   Constitutional:       General: He is not in acute distress.     Appearance: Normal appearance. He is obese. He is ill-appearing.      Comments: +BiPAP   HENT:      Head: Normocephalic and atraumatic.      Right Ear: External ear normal.      Left Ear: External ear normal.      Nose: Nose normal.      Mouth/Throat:      Mouth: Mucous membranes are moist.      Pharynx: Oropharynx is clear.   Eyes:      General: No scleral icterus.     Extraocular Movements: Extraocular movements intact.      Conjunctiva/sclera: Conjunctivae normal.   Cardiovascular:      Rate and Rhythm: Normal rate and regular rhythm.      Heart sounds: Normal heart sounds.      Arteriovenous access: Left arteriovenous access is present.  Pulmonary:      Breath sounds: Rhonchi and rales present. No wheezing.   Abdominal:      General: Abdomen is flat. Bowel sounds are normal. There is no distension.      Palpations: Abdomen is soft.      Tenderness: There is no abdominal tenderness.   Musculoskeletal:         General: No swelling or deformity. Normal range of motion.      Cervical back: Normal range of motion.      Right lower leg: No edema.      Left lower leg: No edema.   Skin:     General: Skin is warm and dry.   Neurological:      General: No focal deficit present.      Mental Status: He is alert.   Psychiatric:         Mood and Affect: Mood normal.        Significant Labs:  CBC:   Recent Labs   Lab 07/26/23  0333   WBC 7.37   RBC 3.09*   HGB 9.3*   HCT 30.9*      *   MCH 30.1   MCHC 30.1*     CMP:   Recent Labs   Lab 07/26/23  0427   *   CALCIUM 9.2   ALBUMIN 3.3*   PROT 7.5      K 5.3*   CO2 25   CL 99   BUN 21*   CREATININE 6.8*   ALKPHOS 105   ALT 12   AST 15   BILITOT 0.4     All labs within the past 24 hours have been  reviewed.    Assessment/Plan:     Pulmonary  * Acute on chronic respiratory failure with hypoxia  - defer to ICU and transplant service    Oxygen dependent  - dependent on oxygen 24 hrs at home, currently requiring continuous BiPAP    Lung replaced by transplant  - sp lung transplant x 2 in 2017 2/2 sarcoidosis and pulmonary HTN  - recommend consult to lung transplant service    Renal/  ESRD (end stage renal disease) on dialysis  62 y.o. White Male ESRD-HD M-W-F presents to ED on 7/25/2023 with diagnosis of: Bilateral pneumonia [J18.9]   Nephrology consulted for inpatient ESRD-HD management    ESRD on IHD  TTS  -  Last iHD 7/25  [facility]/ Dr. Zenia Peacock.  Duration of outpatient dialysis session - 3.5 hrs  EDW: 87.5 kg   Access: LUE brachiocephalic fistula, created 10/13/2021 by Dr Thomason    Assessment:   - Will provide dialysis today (07/26/2023) with UF -2-3 L as BP tolerates for metabolic clearance and volume management   - Labs reviewed and dialysate to be adjusted to current labs.   - Continue to monitor intake and output  - Please avoid gadolinium, fleets, phos-based laxatives, NSAIDs  - Dialysis thrice weekly unless more urgent indications arise. Will evaluate RRT requirements Daily.    Anemia of ESRD   Recent Labs   Lab 07/25/23  1734 07/26/23  0333   WBC 7.78 7.37   HGB 10.2* 9.3*   HCT 34.3* 30.9*    230     Lab Results   Component Value Date    FESATURATED 17 (L) 03/14/2022    FERRITIN 1,619 (H) 03/14/2022       - Goal in ESRD is Hgb of 10-11. Hgb 9.3  - Will review chronic care plan from outpatient dialysis center for ROGELIO dosing.     Mineral Bone Disease in ESRD   Lab Results   Component Value Date    .5 (H) 02/07/2022    CALCIUM 9.2 07/26/2023    CAION 1.20 07/25/2023    PHOS 5.2 (H) 07/26/2023       - F/U PO4, Mg, Calcium. And albumin levels.   - Renal diet with protein intake goal 1.5 g/kg/d with 1 L fluid restriction when appropriate  -  Novasource with meals  - Continue home phos binder when appropriate, phos 5.2  - Daily renal panel so that phos and albumin is monitored daily.           Thank you for your consult. I will follow-up with patient. Please contact us if you have any additional questions.    Cait Finley DNP, FNP-C  Nephrology  [facility] - [facility]

## 2023-07-26 NOTE — PLAN OF CARE
MICU DAILY GOALS     Family/Goals of care/Code Status   Code Status: DNR    24H Vital Sign Range  Temp:  [93.9 °F (34.4 °C)-98.3 °F (36.8 °C)]   Pulse:  [74-97]   Resp:  [18-40]   BP: ()/()   SpO2:  [92 %-100 %]      Shift Events   Precedex gtt started for increasing agitation during shift per MD order. ABGs obtained per orders. Pt on continuous bipap throughout shift.  Tolerating well.     AWAKE RASS: Goal - RASS Goal: 0-->alert and calm  Actual - RASS (Junior Agitation-Sedation Scale): drowsy    Restraint necessity: Not necessary   BREATHE SBT: Not intubated    Coordinate A & B, analgesics/sedatives Pain: managed   SAT: Not intubated   Delirium CAM-ICU: Overall CAM-ICU: Positive   Early(intubated/ Progressive (non-intubated) Mobility MOVE Screen: Fail   Activity: Activity Management: Rolling - L1   Feeding/Nutrition Diet order: Diet/Nutrition Received: NPO,     Thrombus DVT prophylaxis: VTE Required Core Measure: Pharmacological prophylaxis initiated/maintained   HOB Elevation Head of Bed (HOB) Positioning: HOB at 30-45 degrees   Ulcer Prophylaxis GI: yes   Glucose control managed     Skin Skin assessed during daily assessment:   Yes, prevention measures documented.   Bowel Function no issues    Indwelling Catheter Necessity         Pt anuric on HD   De-escalation Antibiotics Yes       VS and assessment per flow sheet, patient progressing towards goals as tolerated, plan of care reviewed with family, all concerns addressed, will continue to monitor.

## 2023-07-26 NOTE — PROGRESS NOTES
Bedside HD completed, blood returned and needles pulled . Post bleeding time < 3 minutes. Net uf removed  3 liters . Post B/P 201/95 , pulse 87 , o2 sat on bipap 100%. Patient alert

## 2023-07-26 NOTE — ASSESSMENT & PLAN NOTE
- sp lung transplant x 2 in 2017 2/2 sarcoidosis and pulmonary HTN  - recommend consult to lung transplant service

## 2023-07-26 NOTE — PROGRESS NOTES
Pharmacokinetic Initial Assessment: IV Vancomycin    Assessment/Plan:    Patient received Vanc at OSH.   Ordered Random. Random resulted as 23.5 ug/mL  Patient with AVILA,ESRD (end stage renal disease) on dialysis.Last HD on 7/25 at OSH  Bilateral lung transplant patient. Found to have a multifocal pneumonia on CXR  Hold dose for today.  Will continue pulse dosing by levels.  Ordered another Random with AM labs on 7/26/23  Goal : 10 - 20 ug/mL  Will re-dose as needed depending on level and renal function.    Please contact pharmacy at extension [phone] with any questions regarding this assessment.     Thank you for the consult,   Marcia Garay       Patient brief summary:  Martin Hendrix Jr. is a 62 y.o. male initiated on antimicrobial therapy with IV Vancomycin for treatment of suspected lower respiratory infection    Drug Allergies:   Review of patient's allergies indicates:  No Known Allergies    Actual Body Weight:   81.5 kg    Renal Function:   Estimated Creatinine Clearance: 14.5 mL/min (A) (based on SCr of 5.6 mg/dL (H)).,     Dialysis Method (if applicable):  intermittent HD. Last HD on 7/25 at OSH    CBC (last 72 hours):  Recent Labs   Lab Result Units 07/25/23  1734   WBC K/uL 7.78   Hemoglobin g/dL 10.2*   Hemoglobin A1C % 5.6   Hematocrit % 34.3*   Platelets K/uL 233   Gran % % 73.1*   Lymph % % 8.5*   Mono % % 14.1   Eosinophil % % 2.2   Basophil % % 0.8   Differential Method  Automated       Metabolic Panel (last 72 hours):  Recent Labs   Lab Result Units 07/24/23  1418 07/25/23  0455 07/25/23  1734   Sodium mmol/L 139 140 139   Potassium mmol/L  --   --  4.6   Chloride mmol/L 94* 98* 100   CO2 mmol/L 32 33* 28   Glucose mg/dL 216* 208* 127*   BUN mg/dL  --   --  18   Blood Urea Nitrogen mg/dL 42* 48*  --    Creatinine mg/dL 9.91* 10.71* 5.6*   Albumin g/dL 4.2  --  3.5   Total Bilirubin mg/dL 0.4  --  0.3   Alkaline Phosphatase U/L 127*  --  102   AST U/L 14  --  18   ALT U/L 12  --  13   Phosphorus  mg/dL  --   --  5.9*       Drug levels (last 3 results):  Recent Labs   Lab Result Units 07/25/23  1827   Vancomycin, Random ug/mL 23.5       Microbiologic Results:  Microbiology Results (last 7 days)       Procedure Component Value Units Date/Time    Culture, Respiratory with Gram Stain [[id number]]     Order Status: No result Specimen: Respiratory     Respiratory Infection Panel (PCR), Nasopharyngeal [[id number]]     Order Status: No result Specimen: Nasopharyngeal Swab

## 2023-07-26 NOTE — SUBJECTIVE & OBJECTIVE
Subjective:     Interval History: Remains on Bi-PAP.  Minimal fluid found on US    Continuous Infusions:   dexmedeTOMIDine (Precedex) infusion (titrating) 0.5 mcg/kg/hr (07/26/23 0600)     Scheduled Meds:   sodium chloride 0.9%   Intravenous Once    aspirin  81 mg Oral Daily    atorvastatin  20 mg Oral Daily    azithromycin  500 mg Intravenous Q24H    calcium-vitamin D3  1 tablet Oral BID    cinacalcet  30 mg Oral Daily with breakfast    everolimus (immunosuppressive)  0.75 mg Oral QHS    everolimus (immunosuppressive)  1.5 mg Oral Daily    famotidine  20 mg Oral Daily    folic acid  1,000 mcg Oral Daily    heparin (porcine)  5,000 Units Subcutaneous Q8H    insulin aspart U-100  5 Units Subcutaneous TIDWM    insulin detemir U-100  15 Units Subcutaneous QHS    magnesium chloride  128 mg Oral BID    meropenem (MERREM) IVPB  500 mg Intravenous Q24H    methylPREDNISolone sodium succinate injection  125 mg Intravenous Q6H    sulfamethoxazole-trimethoprim 400-80mg  1 tablet Oral Every Mon, Wed, Fri    tacrolimus  1.5 mg Oral Daily PM    tacrolimus  2 mg Oral Daily AM    vitamin D  2,000 Units Oral Daily     PRN Meds:acetaminophen, glucagon (human recombinant), glucose, glucose, insulin aspart U-100, morphine, ondansetron, sodium chloride 0.9%, Pharmacy to dose Vancomycin consult **AND** vancomycin - pharmacy to dose    Review of patient's allergies indicates:  No Known Allergies    Review of Systems   Respiratory:  Positive for cough, chest tightness, shortness of breath and wheezing.    Gastrointestinal:  Negative for diarrhea, nausea and vomiting.   Allergic/Immunologic: Positive for immunocompromised state.   Psychiatric/Behavioral:  Positive for confusion (at times). The patient is nervous/anxious (at times).    Objective:        Physical Exam  Vitals reviewed.   Constitutional:       General: He is awake.      Appearance: He is well-developed. He is ill-appearing.      Interventions: Nasal cannula and face mask in  place.      Comments: Responds appropriately   HENT:      Head: Normocephalic and atraumatic.   Eyes:      Extraocular Movements: Extraocular movements intact.      Conjunctiva/sclera: Conjunctivae normal.   Cardiovascular:      Rate and Rhythm: Normal rate and regular rhythm.      Heart sounds: Normal heart sounds.   Pulmonary:      Effort: Tachypnea and accessory muscle usage (at times) present. No respiratory distress.      Breath sounds: Examination of the right-upper field reveals rhonchi. Examination of the left-upper field reveals rhonchi. Decreased breath sounds and rhonchi present. No wheezing.   Abdominal:      General: Bowel sounds are normal.      Palpations: Abdomen is soft.      Tenderness: There is no abdominal tenderness.   Musculoskeletal:         General: Normal range of motion.   Skin:     General: Skin is warm and dry.   Neurological:      Mental Status: He is alert, oriented to person, place, and time and easily aroused.   Psychiatric:         Speech: Speech is delayed.              Vital Signs (Most Recent):  Temp: 98.6 °F (37 °C) (07/26/23 0701)  Pulse: 87 (07/26/23 0830)  Resp: (!) 32 (07/26/23 0830)  BP: (!) 163/75 (07/26/23 0830)  SpO2: 100 % (07/26/23 0830) Vital Signs (24h Range):  Temp:  [93.9 °F (34.4 °C)-98.6 °F (37 °C)] 98.6 °F (37 °C)  Pulse:  [74-97] 87  Resp:  [18-40] 32  SpO2:  [92 %-100 %] 100 %  BP: ()/() 163/75     Weight: 87.5 kg (192 lb 14.4 oz)  Body mass index is 27.68 kg/m².      Intake/Output Summary (Last 24 hours) at 7/26/2023 1045  Last data filed at 7/26/2023 0600  Gross per 24 hour   Intake 405.79 ml   Output 0 ml   Net 405.79 ml       Ventilator Data:     Oxygen Concentration (%):  [35-60] 35  Resp Rate Total:  [16 br/min-25 br/min] 25 br/min        Hemodynamic Parameters:       Lines/Drains:       Peripheral IV - Single Lumen 07/25/23 2306 20 G Posterior;Right Hand (Active)   Site Assessment Clean;Dry;Intact;No redness;No swelling 07/26/23 1227    Extremity Assessment Distal to IV No abnormal discoloration;No redness;No swelling;No warmth 07/26/23 0701   Line Status Infusing 07/26/23 0701   Dressing Status Clean;Dry;Intact 07/26/23 0701   Dressing Intervention Integrity maintained 07/26/23 0701   Dressing Change Due 07/29/23 07/26/23 0701   Site Change Due 07/29/23 07/26/23 0701   Reason Not Rotated Not due 07/26/23 0701   Number of days: 0            Peripheral IV - Single Lumen 07/25/23 2335 20 G Right Antecubital (Active)   Site Assessment Clean;Dry;Intact;No redness;No swelling 07/26/23 0701   Extremity Assessment Distal to IV No abnormal discoloration;No redness;No swelling;No warmth 07/26/23 0701   Line Status Flushed;Saline locked 07/26/23 0701   Dressing Status Clean;Dry;New drainage 07/26/23 0701   Dressing Change Due 07/29/23 07/26/23 0701   Site Change Due 07/29/23 07/26/23 0701   Reason Not Rotated Not due 07/26/23 0701   Number of days: 0            Hemodialysis AV Fistula Left forearm (Active)   Needle Size 15ga 02/17/22 1520   Site Assessment Clean;Dry;Intact 07/26/23 0701   Patency Thrill;Bruit;Present 07/26/23 0701   Status Deaccessed 07/26/23 0701   Flows Good 02/17/22 1807   Dressing Intervention First dressing 02/17/22 1807   Dressing Status Old drainage 07/25/23 1701   Site Condition No complications 07/25/23 1701   Dressing Gauze 07/26/23 0701   Number of days:        Significant Labs:  CBC:  Recent Labs   Lab 07/26/23  0333   WBC 7.37   RBC 3.09*   HGB 9.3*   HCT 30.9*      *   MCH 30.1   MCHC 30.1*     BMP:  Recent Labs   Lab 07/25/23  0455 07/25/23  1734 07/26/23  0427   GLUCOSE 208*  --   --       < > 136   K 4.7   < > 5.3*   CL  --    < > 99   CO2 33*   < > 25   BUN 48*   < > 21*   CREATININE 10.71*   < > 6.8*   CALCIUM 8.5*   < > 9.2    < > = values in this interval not displayed.      Tacrolimus Levels:  Recent Labs   Lab 07/26/23  0546   TACROLIMUS 6.8     Microbiology:  Microbiology Results (last 7 days)        Procedure Component Value Units Date/Time    Respiratory Infection Panel (PCR), Nasopharyngeal [[id number]] Collected: 07/25/23 2131    Order Status: Completed Specimen: Nasopharyngeal Swab Updated: 07/26/23 0232     Respiratory Infection Panel Source NP Swab     Adenovirus Not Detected     Coronavirus 229E, Common Cold Virus Not Detected     Coronavirus HKU1, Common Cold Virus Not Detected     Coronavirus NL63, Common Cold Virus Not Detected     Coronavirus OC43, Common Cold Virus Not Detected     Comment: The Coronavirus strains detected in this test cause the common cold.  These strains are not the COVID-19 (novel Coronavirus)strain   associated with the respiratory disease outbreak.          SARS-CoV2 (COVID-19) Qualitative PCR Not Detected     Human Metapneumovirus Not Detected     Human Rhinovirus/Enterovirus Not Detected     Influenza B Not Detected     Parainfluenza Virus 1 Not Detected     Parainfluenza Virus 2 Not Detected     Parainfluenza Virus 3 Not Detected     Parainfluenza Virus 4 Not Detected     Respiratory Syncytial Virus Not Detected     Bordetella Parapertussis ([id number]) Not Detected     Bordetella pertussis (ptxP) Not Detected     Chlamydia pneumoniae Not Detected     Mycoplasma pneumoniae Not Detected    Narrative:      For all other respiratory sources, order [id number] -  Respiratory Viral Panel by PCR    Respiratory Infection Panel (PCR), Nasopharyngeal [[id number]] Collected: 07/25/23 2131    Order Status: Completed Specimen: Nasopharyngeal Swab Updated: 07/25/23 2131     Respiratory Infection Panel Source NP Swab    Narrative:      For all other respiratory sources, order [id number] -  Respiratory Viral Panel by PCR    Culture, Respiratory with Gram Stain [[id number]]     Order Status: No result Specimen: Respiratory             I have reviewed all pertinent labs within the past 24 hours.    Diagnostic Results:  Chest X-Ray: There are moderate right and small left pleural effusions.  There are  worsening interstitial and alveolar opacities in the bilateral lungs.  There is no pneumothorax.

## 2023-07-26 NOTE — ASSESSMENT & PLAN NOTE
Underwent BLT on 8/22/2017 for pulmonary hypertension.  Now with CLAD.  Continue OIP and immunosuppression

## 2023-07-27 NOTE — SUBJECTIVE & OBJECTIVE
Interval History: Off BiPAP, now on HFNC  Yesterday patient had HD with 3L removed      Review of Systems   Unable to perform ROS: Acuity of condition   Objective:     Vital Signs (Most Recent):  Temp: 97.5 °F (36.4 °C) (07/27/23 1100)  Pulse: 92 (07/27/23 1600)  Resp: (!) 25 (07/27/23 1600)  BP: (!) 142/74 (07/27/23 1600)  SpO2: 100 % (07/27/23 1600) Vital Signs (24h Range):  Temp:  [97.4 °F (36.3 °C)-98.3 °F (36.8 °C)] 97.5 °F (36.4 °C)  Pulse:  [72-97] 92  Resp:  [16-39] 25  SpO2:  [98 %-100 %] 100 %  BP: (121-187)/(64-97) 142/74     Weight: 86.6 kg (191 lb)  Body mass index is 27.41 kg/m².    Estimated Creatinine Clearance: 16.9 mL/min (A) (based on SCr of 4.8 mg/dL (H)).     Physical Exam  Constitutional:       General: He is not in acute distress.     Appearance: He is well-developed. He is ill-appearing. He is not diaphoretic.   HENT:      Head: Normocephalic and atraumatic.      Nose: Nose normal.      Comments: On high flow nasal cannula  Eyes:      Conjunctiva/sclera: Conjunctivae normal.   Pulmonary:      Effort: Pulmonary effort is normal. No respiratory distress.   Abdominal:      General: Abdomen is flat. There is no distension.   Musculoskeletal:      Cervical back: Normal range of motion.      Right lower leg: No edema.      Left lower leg: No edema.   Skin:     General: Skin is warm and dry.      Findings: No erythema or rash.   Neurological:      Mental Status: He is alert.   Psychiatric:         Behavior: Behavior normal.        Significant Labs: All pertinent labs within the past 24 hours have been reviewed.    Significant Imaging: I have reviewed all pertinent imaging results/findings within the past 24 hours.

## 2023-07-27 NOTE — ASSESSMENT & PLAN NOTE
BG goal: 140-180   T2DM.  Hx of lung transplant w/ CLAD. BG stable on admit. Started on Solumedrol 125mg q6 hr.  Bg mostly at or slightly above goal.  Will continue to monitor on current regimen    - Continue Levemir 12 units AC  - Continue Novolog 4 units AC  - Continue MDC  - POCT Glucose before meals and at bedtime  - Hypoglycemia protocol in place      ** Please notify Endocrine for any change and/or advance in diet**  ** Please call Endocrine for any BG related issues **     Discharge Planning:   TBD. Please notify endocrinology prior to discharge.

## 2023-07-27 NOTE — PROGRESS NOTES
[facility] - Card[facility] ICU  Nephrology  Progress Note    Patient Name: Martin Hendrix Jr.  MRN: [id number]  Admission Date: 7/25/2023  Hospital Length of Stay: 2 days  Attending Provider: Samantha Bill DO   Primary Care Physician: Michel Henley MD  Principal Problem:Acute on chronic respiratory failure with hypoxia    Subjective:     Interval History:   HD completed on yesterday. Net UF 3 L. Net negative 2.3 L/24 hrs. Oxygen requirements improved from continuous BiPAP to comfort flow 50 L / 35 %.   More oriented on exam this AM.     Review of patient's allergies indicates:  No Known Allergies  Current Facility-Administered Medications   Medication Frequency    acetaminophen tablet 1,000 mg Q6H PRN    aspirin EC tablet 81 mg Daily    atorvastatin tablet 20 mg Daily    azithromycin (ZITHROMAX) 500 mg in dextrose 5 % (D5W) 250 mL IVPB (Vial-Mate) Q24H    calcium-vitamin D3 500 mg-5 mcg (200 unit) per tablet 1 tablet BID    cinacalcet tablet 30 mg Daily with breakfast    dexmedetomidine (PRECEDEX) 400mcg/100mL 0.9% NaCL infusion Continuous    everolimus (immunosuppressive) tablet 0.75 mg QHS    everolimus (immunosuppressive) tablet 1.5 mg Daily    famotidine tablet 20 mg Daily    folic acid tablet 1,000 mcg Daily    glucagon (human recombinant) injection 1 mg PRN    glucose chewable tablet 16 g PRN    glucose chewable tablet 24 g PRN    heparin (porcine) injection 5,000 Units Q8H    insulin aspart U-100 pen 1-10 Units QID (AC + HS) PRN    insulin aspart U-100 pen 4 Units TIDWM    insulin detemir U-100 (Levemir) pen 12 Units QHS    magnesium chloride TBEC tablet TbEC 128 mg BID    meropenem (MERREM) 500 mg in sodium chloride 0.9 % 100 mL IVPB (MB+) Q24H    methylPREDNISolone sodium succinate injection 125 mg Q6H    morphine injection 2 mg Q4H PRN    ondansetron disintegrating tablet 8 mg Q8H PRN    sodium chloride 0.9% flush 10 mL PRN    sulfamethoxazole-trimethoprim 400-80mg per  tablet 1 tablet Every Mon, Wed, Fri    tacrolimus capsule 1.5 mg Daily PM    tacrolimus capsule 2 mg Daily AM    vancomycin - pharmacy to dose pharmacy to manage frequency    vitamin D 1000 units tablet 2,000 Units Daily     Facility-Administered Medications Ordered in Other Encounters   Medication Frequency    0.9%  NaCl infusion Continuous    cefazolin (ANCEF) 2 gram in dextrose 5% 50 mL IVPB (premix) On Call Procedure       Objective:     Vital Signs (Most Recent):  Temp: 97.4 °F (36.3 °C) (07/27/23 0701)  Pulse: 86 (07/27/23 1000)  Resp: (!) 34 (07/27/23 1000)  BP: (!) 141/78 (07/27/23 1000)  SpO2: 100 % (07/27/23 1000) Vital Signs (24h Range):  Temp:  [97.4 °F (36.3 °C)-99.1 °F (37.3 °C)] 97.4 °F (36.3 °C)  Pulse:  [72-90] 86  Resp:  [16-39] 34  SpO2:  [81 %-100 %] 100 %  BP: (121-194)/(67-98) 141/78     Weight: 86.6 kg (191 lb) (07/27/23 0300)  Body mass index is 27.41 kg/m².  Body surface area is 2.07 meters squared.    I/O last 3 completed shifts:  In: 1552.2 [P.O.:240; I.V.:434.3; Other:250; IV Piggyback:627.9]  Out: 3500 [Other:3500]     Physical Exam  Vitals and nursing note reviewed.   Constitutional:       General: He is not in acute distress.     Appearance: Normal appearance. He is obese. He is ill-appearing.      Comments: +BiPAP   HENT:      Head: Normocephalic and atraumatic.      Right Ear: External ear normal.      Left Ear: External ear normal.      Nose: Nose normal.      Mouth/Throat:      Mouth: Mucous membranes are moist.      Pharynx: Oropharynx is clear.   Eyes:      General: No scleral icterus.     Extraocular Movements: Extraocular movements intact.      Conjunctiva/sclera: Conjunctivae normal.   Cardiovascular:      Rate and Rhythm: Normal rate and regular rhythm.      Heart sounds: Normal heart sounds.      Arteriovenous access: Left arteriovenous access is present.  Pulmonary:      Breath sounds: Rhonchi and rales present. No wheezing.   Abdominal:      General: Abdomen is flat.  Bowel sounds are normal. There is no distension.      Palpations: Abdomen is soft.      Tenderness: There is no abdominal tenderness.   Musculoskeletal:         General: No swelling or deformity. Normal range of motion.      Cervical back: Normal range of motion.      Right lower leg: No edema.      Left lower leg: No edema.   Skin:     General: Skin is warm and dry.   Neurological:      General: No focal deficit present.      Mental Status: He is alert.   Psychiatric:         Mood and Affect: Mood normal.        Significant Labs:  CBC:   Recent Labs   Lab 07/27/23  0308   WBC 6.97   RBC 3.35*   HGB 10.1*   HCT 32.3*      MCV 96   MCH 30.1   MCHC 31.3*     CMP:   Recent Labs   Lab 07/27/23  0308   *   CALCIUM 9.5   ALBUMIN 3.3*   PROT 7.8   *   K 4.9   CO2 21*      BUN 27*   CREATININE 4.8*   ALKPHOS 95   ALT 13   AST 19   BILITOT 0.3     All labs within the past 24 hours have been reviewed.       Assessment/Plan:     Pulmonary  * Acute on chronic respiratory failure with hypoxia  - defer to ICU and transplant service    Oxygen dependent  - dependent on oxygen 24 hrs at home, initially requiring continuous BiPAP, now on comfort flow     Lung replaced by transplant  - sp lung transplant x 2 in 2017 2/2 sarcoidosis and pulmonary HTN  - lung transplant service following     Renal/  ESRD (end stage renal disease) on dialysis  62 y.o. White Male ESRD-HD M-W-F presents to ED on 7/25/2023 with diagnosis of: Bilateral pneumonia [J18.9]   Nephrology consulted for inpatient ESRD-HD management    ESRD on IHD  TTS  -  Last iHD 7/25  [facility]/ Dr. Zenia Peacock.  Duration of outpatient dialysis session - 3.5 hrs  EDW: 87.5 kg   Access: LUE brachiocephalic fistula, created 10/13/2021 by Dr Thomason    Assessment:   - No emergent need for HD at this particular time. HD performed on yesterday with 3 L removed. Will FU with primary team regarding the need additional  volume removal.   - Continue to monitor intake and output  - Please avoid gadolinium, fleets, phos-based laxatives, NSAIDs  - Dialysis thrice weekly unless more urgent indications arise. Will evaluate RRT requirements Daily.    Anemia of ESRD   Recent Labs   Lab 07/25/23  1734 07/26/23  0333 07/27/23  0308   WBC 7.78 7.37 6.97   HGB 10.2* 9.3* 10.1*   HCT 34.3* 30.9* 32.3*    230 295     Lab Results   Component Value Date    FESATURATED 17 (L) 03/14/2022    FERRITIN 1,619 (H) 03/14/2022       - Goal in ESRD is Hgb of 10-11. Hgb 10.1.  - No Epogen   Mineral Bone Disease in ESRD   Lab Results   Component Value Date    .5 (H) 02/07/2022    CALCIUM 9.5 07/27/2023    CAION 1.20 07/25/2023    PHOS 5.2 (H) 07/27/2023       - F/U PO4, Mg, Calcium. And albumin levels.   - Renal diet with protein intake goal 1.5 g/kg/d with 1 L fluid restriction when appropriate  - Continue home phos binder when appropriate, phos 5.2  - Daily renal panel so that phos and albumin is monitored daily.       Thank you for your consult. I will follow-up with patient. Please contact us if you have any additional questions.    Cait Finley DNP, FNP-C  Nephrology  [facility] - Card[facility]

## 2023-07-27 NOTE — PROGRESS NOTES
Pharmacokinetic Assessment Follow Up: IV Vancomycin    Vancomycin serum concentration assessment(s):    The random level was drawn correctly and can be used to guide therapy at this time. The measurement is within the desired definitive target range of 15 to 20 mcg/mL.    Vancomycin Regimen Plan:    No plan for HD today per nephrology  Vancomycin 500 mg x 1 today  Random level for 7/27 with am labs    Drug levels (last 3 results):  Recent Labs   Lab Result Units 07/25/23  1827 07/26/23  0333 07/27/23  0308   Vancomycin, Random ug/mL 23.5 21.5 15.5       Pharmacy will continue to follow and monitor vancomycin.    Please contact pharmacy at extension [phone] for questions regarding this assessment.    Thank you for the consult,   Thony Lyles       Patient brief summary:    Martin Hendrix Jr. is a 62 y.o. male initiated on antimicrobial therapy with IV Vancomycin for treatment of lower respiratory infection    Drug Allergies:   Review of patient's allergies indicates:  No Known Allergies    Actual Body Weight:   87 kg    Renal Function:   Estimated Creatinine Clearance: 16.9 mL/min (A) (based on SCr of 4.8 mg/dL (H)).,     Dialysis Method (if applicable):  intermittent HD    CBC (last 72 hours):  Recent Labs   Lab Result Units 07/25/23  1734 07/26/23  0333 07/27/23  0308   WBC K/uL 7.78 7.37 6.97   Hemoglobin g/dL 10.2* 9.3* 10.1*   Hemoglobin A1C % 5.6  --   --    Hematocrit % 34.3* 30.9* 32.3*   Platelets K/uL 233 230 295   Gran % % 73.1* 90.4* 89.6*   Lymph % % 8.5* 6.0* 7.5*   Mono % % 14.1 2.0* 2.2*   Eosinophil % % 2.2 0.4 0.0   Basophil % % 0.8 0.5 0.1   Differential Method  Automated Automated Automated       Metabolic Panel (last 72 hours):  Recent Labs   Lab Result Units 07/24/23  1418 07/25/23  0455 07/25/23  1734 07/26/23  0333 07/26/23  0427 07/27/23  0308   Sodium mmol/L 139 140 139 136 136 134*   Potassium mmol/L  --   --  4.6 5.8* 5.3* 4.9   Chloride mmol/L 94* 98* 100 101 99 100   CO2 mmol/L 32 33* 28  22* 25 21*   Glucose mg/dL 216* 208* 127* 158* 176* 204*   BUN mg/dL  --   --  18 21* 21* 27*   Blood Urea Nitrogen mg/dL 42* 48*  --   --   --   --    Creatinine mg/dL 9.91* 10.71* 5.6* 6.0* 6.8* 4.8*   Albumin g/dL 4.2  --  3.5 3.1* 3.3* 3.3*   Total Bilirubin mg/dL 0.4  --  0.3 0.3 0.4 0.3   Alkaline Phosphatase U/L 127*  --  102 87 105 95   AST U/L 14  --  18 21 15 19   ALT U/L 12  --  13 14 12 13   Magnesium mg/dL  --   --   --  2.2  --  2.5   Phosphorus mg/dL  --   --  5.9* 5.2*  --  5.2*       Vancomycin Administrations:  vancomycin given in the last 96 hours        No antibiotic orders with administrations found.                    Microbiologic Results:  Microbiology Results (last 7 days)       Procedure Component Value Units Date/Time    Cryptococcal antigen, blood [[id number]] Collected: 07/27/23 0310    Order Status: Completed Specimen: Blood, Venous Updated: 07/27/23 0847     Cryptococcal Ag, Blood Negative    Respiratory Infection Panel (PCR), Nasopharyngeal [[id number]] Collected: 07/25/23 2131    Order Status: Completed Specimen: Nasopharyngeal Swab Updated: 07/26/23 0232     Respiratory Infection Panel Source NP Swab     Adenovirus Not Detected     Coronavirus 229E, Common Cold Virus Not Detected     Coronavirus HKU1, Common Cold Virus Not Detected     Coronavirus NL63, Common Cold Virus Not Detected     Coronavirus OC43, Common Cold Virus Not Detected     Comment: The Coronavirus strains detected in this test cause the common cold.  These strains are not the COVID-19 (novel Coronavirus)strain   associated with the respiratory disease outbreak.          SARS-CoV2 (COVID-19) Qualitative PCR Not Detected     Human Metapneumovirus Not Detected     Human Rhinovirus/Enterovirus Not Detected     Influenza B Not Detected     Parainfluenza Virus 1 Not Detected     Parainfluenza Virus 2 Not Detected     Parainfluenza Virus 3 Not Detected     Parainfluenza Virus 4 Not Detected     Respiratory Syncytial Virus  Not Detected     Bordetella Parapertussis ([id number]) Not Detected     Bordetella pertussis (ptxP) Not Detected     Chlamydia pneumoniae Not Detected     Mycoplasma pneumoniae Not Detected    Narrative:      For all other respiratory sources, order [id number] -  Respiratory Viral Panel by PCR    Respiratory Infection Panel (PCR), Nasopharyngeal [[id number]] Collected: 07/25/23 2131    Order Status: Completed Specimen: Nasopharyngeal Swab Updated: 07/25/23 2131     Respiratory Infection Panel Source NP Swab    Narrative:      For all other respiratory sources, order [id number] -  Respiratory Viral Panel by PCR    Culture, Respiratory with Gram Stain [[id number]]     Order Status: No result Specimen: Respiratory

## 2023-07-27 NOTE — PLAN OF CARE
MICU DAILY GOALS     Family/Goals of care/Code Status   Code Status: DNR    24H Vital Sign Range  Temp:  [98 °F (36.7 °C)-99.1 °F (37.3 °C)]   Pulse:  [72-92]   Resp:  [16-39]   BP: (121-194)/(67-98)   SpO2:  [81 %-100 %]      Shift Events   No acute events throughout shift    AWAKE RASS: Goal - RASS Goal: 0-->alert and calm  Actual - RASS (Junior Agitation-Sedation Scale): drowsy    Restraint necessity: Not necessary   BREATHE SBT: Not intubated    Coordinate A & B, analgesics/sedatives Pain: managed   SAT: Not intubated   Delirium CAM-ICU: Overall CAM-ICU: Positive   Early(intubated/ Progressive (non-intubated) Mobility MOVE Screen: Pass   Activity: Activity Management: Rolling - L1   Feeding/Nutrition Diet order: Diet/Nutrition Received: consistent carb/diabetic diet,     Thrombus DVT prophylaxis: VTE Required Core Measure: Pharmacological prophylaxis initiated/maintained   HOB Elevation Head of Bed (HOB) Positioning: HOB at 30-45 degrees   Ulcer Prophylaxis GI: yes   Glucose control managed     Skin Skin assessed during daily assessment:   Yes, No altered skin integrity present. Prevention measures documented.    Bowel Function no issues    Indwelling Catheter Necessity         N/A   De-escalation Antibiotics Yes       VS and assessment per flow sheet, patient progressing towards goals as tolerated, plan of care reviewed with family, all concerns addressed, will continue to monitor.

## 2023-07-27 NOTE — SUBJECTIVE & OBJECTIVE
Interval HPI:   No acute events overnight. Patient in room 6097/6097 A. Blood glucose stable. BG at and above goal on current insulin regimen (SSI, prandial, and basal insulin ). Steroid use- Methylprednisolone  125 mg .      Renal function- Abnormal -   Vasopressors-  None     Diet diabetic [facility]; 2000 Calorie     Eating:   <25%  Nausea: No  Hypoglycemia and intervention: No  Fever: No  TPN and/or TF: No    BP (!) 141/78 (BP Location: Right arm, Patient Position: Lying)   Pulse 86   Temp 97.4 °F (36.3 °C) (Axillary)   Resp (!) 34   Wt 86.6 kg (191 lb)   SpO2 100%   BMI 27.41 kg/m²     Labs Reviewed and Include    Recent Labs   Lab 07/27/23  0308   *   CALCIUM 9.5   ALBUMIN 3.3*   PROT 7.8   *   K 4.9   CO2 21*      BUN 27*   CREATININE 4.8*   ALKPHOS 95   ALT 13   AST 19   BILITOT 0.3     Lab Results   Component Value Date    WBC 6.97 07/27/2023    HGB 10.1 (L) 07/27/2023    HCT 32.3 (L) 07/27/2023    MCV 96 07/27/2023     07/27/2023     Recent Labs   Lab 07/25/23  1734   TSH 0.834     Lab Results   Component Value Date    HGBA1C 5.6 07/25/2023       Nutritional status:   Body mass index is 27.41 kg/m².  Lab Results   Component Value Date    ALBUMIN 3.3 (L) 07/27/2023    ALBUMIN 3.3 (L) 07/26/2023    ALBUMIN 3.1 (L) 07/26/2023     No results found for: PREALBUMIN    Estimated Creatinine Clearance: 16.9 mL/min (A) (based on SCr of 4.8 mg/dL (H)).    Accu-Checks  Recent Labs     07/25/23  1916 07/26/23  0913 07/26/23  1209 07/26/23  1811 07/26/23  2115 07/27/23  0812   POCTGLUCOSE 134* 202* 187* 141* 250* 217*       Current Medications and/or Treatments Impacting Glycemic Control  Immunotherapy:    Immunosuppressants           Stop Route Frequency     tacrolimus capsule 1.5 mg         -- Oral Every evening     everolimus (immunosuppressive) tablet 1.5 mg         -- Oral Daily     tacrolimus capsule 2 mg         -- Oral Every morning     everolimus (immunosuppressive) tablet  0.75 mg         -- Oral Nightly          Steroids:   Hormones (From admission, onward)      Start     Stop Route Frequency Ordered    07/26/23 0000  methylPREDNISolone sodium succinate injection 125 mg         -- IV Every 6 hours 07/25/23 2041          Pressors:    Autonomic Drugs (From admission, onward)      None          Hyperglycemia/Diabetes Medications:   Antihyperglycemics (From admission, onward)      Start     Stop Route Frequency Ordered    07/26/23 2100  insulin detemir U-100 (Levemir) pen 12 Units         -- SubQ Nightly 07/26/23 1610    07/26/23 1645  insulin aspart U-100 pen 4 Units         -- SubQ 3 times daily with meals 07/26/23 1610    07/25/23 1840  insulin aspart U-100 pen 1-10 Units         -- SubQ Before meals & nightly PRN 07/25/23 3027

## 2023-07-27 NOTE — PROGRESS NOTES
[facility] - Cardiac Medical ICU  Endocrinology  Progress Note    Admit Date: 7/25/2023     RReason for Consult: Management of T2DM, Hyperglycemia     Surgical Procedure and Date: Double lung transplant in 8/2017      Diabetes diagnosis year: 2017     Home Diabetes Medications:  Tresiba 20 unit HS & Humalog 10u AC  (per med rec)     How often checking glucose at home? CAROLA  BG readings on regimen: CAROLA  Hypoglycemia on the regimen?  No  Missed doses on regimen?  CAROLA     Diabetes Complications include:     Hyperglycemia and Diabetic peripheral neuropathy      Complicating diabetes co morbidities:   KRISTYN and Glucocorticoid use       HPI:   Patient is a 62 y.o. 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 He has felt short of breath for the last week and said he has been going to his dialysis as scheduled. He denied fever, chills, cough, N/V/D. He has been compliant with his anti-rejection meds. Found to have a multifocal pneumonia on CXR; Covid and Trop negative. While awaiting transfer to [facility] for transplant care- his respiratory status worsened requiring continuous bipap. Arrived at [facility] MICU on 7/25. He received HD at [facility] the morning of his transfer.   Endocrine consulted for bg management.          Interval HPI:   No acute events overnight. Patient in room 6097/6097 A. Blood glucose stable. BG at and above goal on current insulin regimen (SSI, prandial, and basal insulin ). Steroid use- Methylprednisolone  125 mg .      Renal function- Abnormal -   Vasopressors-  None     Diet diabetic [facility]; 2000 Calorie     Eating:   <25%  Nausea: No  Hypoglycemia and intervention: No  Fever: No  TPN and/or TF: No    BP (!) 141/78 (BP Location: Right arm, Patient Position: Lying)   Pulse 86   Temp 97.4 °F (36.3 °C) (Axillary)   Resp (!) 34   Wt 86.6 kg (191 lb)   SpO2 100%   BMI 27.41 kg/m²      Labs Reviewed and Include    Recent Labs   Lab 07/27/23  0308   *   CALCIUM 9.5   ALBUMIN 3.3*   PROT 7.8   *   K 4.9   CO2 21*      BUN 27*   CREATININE 4.8*   ALKPHOS 95   ALT 13   AST 19   BILITOT 0.3     Lab Results   Component Value Date    WBC 6.97 07/27/2023    HGB 10.1 (L) 07/27/2023    HCT 32.3 (L) 07/27/2023    MCV 96 07/27/2023     07/27/2023     Recent Labs   Lab 07/25/23  1734   TSH 0.834     Lab Results   Component Value Date    HGBA1C 5.6 07/25/2023       Nutritional status:   Body mass index is 27.41 kg/m².  Lab Results   Component Value Date    ALBUMIN 3.3 (L) 07/27/2023    ALBUMIN 3.3 (L) 07/26/2023    ALBUMIN 3.1 (L) 07/26/2023     No results found for: PREALBUMIN    Estimated Creatinine Clearance: 16.9 mL/min (A) (based on SCr of 4.8 mg/dL (H)).    Accu-Checks  Recent Labs     07/25/23  1916 07/26/23  0913 07/26/23  1209 07/26/23  1811 07/26/23  2115 07/27/23  0812   POCTGLUCOSE 134* 202* 187* 141* 250* 217*       Current Medications and/or Treatments Impacting Glycemic Control  Immunotherapy:    Immunosuppressants           Stop Route Frequency     tacrolimus capsule 1.5 mg         -- Oral Every evening     everolimus (immunosuppressive) tablet 1.5 mg         -- Oral Daily     tacrolimus capsule 2 mg         -- Oral Every morning     everolimus (immunosuppressive) tablet 0.75 mg         -- Oral Nightly          Steroids:   Hormones (From admission, onward)      Start     Stop Route Frequency Ordered    07/26/23 0000  methylPREDNISolone sodium succinate injection 125 mg         -- IV Every 6 hours 07/25/23 2041          Pressors:    Autonomic Drugs (From admission, onward)      None          Hyperglycemia/Diabetes Medications:   Antihyperglycemics (From admission, onward)      Start     Stop Route Frequency Ordered    07/26/23 2100  insulin detemir U-100 (Levemir) pen 12 Units         -- SubQ Nightly 07/26/23 1610    07/26/23 1645  insulin aspart U-100 pen 4 Units          -- SubQ 3 times daily with meals 07/26/23 1610    07/25/23 1840  insulin aspart U-100 pen 1-10 Units         -- SubQ Before meals & nightly PRN 07/25/23 1741            ASSESSMENT and PLAN    Pulmonary  * Acute on chronic respiratory failure with hypoxia  Managed per primary team  Optimize bg control      Immunology/Multi System  Immunosuppression  On immunosuppressive therapy per transplant team; may elevate BG readings        Endocrine  Type 2 diabetes mellitus with hyperglycemia, with long-term current use of insulin  BG goal: 140-180   T2DM.  Hx of lung transplant w/ CLAD. BG stable on admit. Started on Solumedrol 125mg q6 hr.  Bg mostly at or slightly above goal.  Will continue to monitor on current regimen    - Continue Levemir 12 units AC  - Continue Novolog 4 units AC  - Continue MDC  - POCT Glucose before meals and at bedtime  - Hypoglycemia protocol in place      ** Please notify Endocrine for any change and/or advance in diet**  ** Please call Endocrine for any BG related issues **     Discharge Planning:   TBD. Please notify endocrinology prior to discharge.            Edwar Marte PA-C  Endocrinology  [facility] - Cardiac Medical ICU

## 2023-07-27 NOTE — PROGRESS NOTES
[facility] - Cardiac Medical ICU  Infectious Disease  Progress Note    Patient Name: Martin Hendrix Jr.  MRN: [id number]  Admission Date: 7/25/2023  Length of Stay: 2 days  Attending Physician: Samantha Bill DO  Primary Care Provider: Michel Henley MD    Isolation Status: No active isolations  Assessment/Plan:      Pulmonary  * Acute on chronic respiratory failure with hypoxia  62-year-old male with sarcoidosis s/p BOLT 8/22/2017 on tacro, everolimus, CMV UL-97 resistant infection s/p course foscarnet 2019, ESRD on HD 2021, CLAD c/b chronic respiratory failure on 2L NC, presented 7/25/2023 with acute on chronic respiratory failure, critically ill on HFNC.     Work-up thus far includes negative RIP.     Recommendations:  Follow-up aspergillus Ag, fungitell, CMV VL  Continue vancomycin IV  Consider de-escalating meropenem to cefepime IV  On TMP-SMX prophy        Critical care time: 35 minutes   I personally spent critical care time on the following: evaluating this patient's organ dysfunction, development of treatment plan, discussing treatment plan with patient or surrogate and bedside caregivers, discussions with critical care service and/or consultants, evaluation of patient's response to treatment, physical examination of patient, ordering and review of treatments interventions, laboratory studies, and radiographic studies, re-evaluation of patient's condition. This critical care time did not overlap with that of any other provider of the same specialty or involve time for procedures.       Thank you for your consult. I will follow-up with patient. Please contact us if you have any additional questions.    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 He has felt short of breath for the last week and said he has been going to his dialysis as scheduled. He denied fever, chills, cough, N/V/D. He has been compliant with his anti-rejection meds. Found to have a multifocal pneumonia on CXR; Covid and Trop negative. While awaiting transfer to [facility] for transplant care- his respiratory status worsened requiring continuous bipap. Arrived at [facility] MICU on 7/25. Further evaluation revealed that patient's condition is most likely due to graft rejection. ID consulted for "Status lung transplant on Immunosuppressive therapy, ? Pneumonia"    Interval History: Off BiPAP, now on HFNC  Yesterday patient had HD with 3L removed      Review of Systems   Unable to perform ROS: Acuity of condition   Objective:     Vital Signs (Most Recent):  Temp: 97.5 °F (36.4 °C) (07/27/23 1100)  Pulse: 92 (07/27/23 1600)  Resp: (!) 25 (07/27/23 1600)  BP: (!) 142/74 (07/27/23 1600)  SpO2: 100 % (07/27/23 1600) Vital Signs (24h Range):  Temp:  [97.4 °F (36.3 °C)-98.3 °F (36.8 °C)] 97.5 °F (36.4 °C)  Pulse:  [72-97] 92  Resp:  [16-39] 25  SpO2:  [98 %-100 %] 100 %  BP: (121-187)/(64-97) 142/74     Weight: 86.6 kg (191 lb)  Body mass index is 27.41 kg/m².    Estimated Creatinine Clearance: 16.9 mL/min (A) (based on SCr of 4.8 mg/dL (H)).     Physical Exam  Constitutional:       General: He is not in acute distress.     Appearance: He is well-developed. He is ill-appearing. He is not diaphoretic.   HENT:      Head: Normocephalic and atraumatic.      Nose: Nose normal.      Comments: On high flow nasal cannula  Eyes:      Conjunctiva/sclera: Conjunctivae normal.   Pulmonary:      Effort: Pulmonary effort is normal. No respiratory distress.   Abdominal:      General: Abdomen is flat. There is no distension.   Musculoskeletal:      Cervical back: Normal range of motion.      Right lower leg: No edema.      Left lower leg: No edema.   Skin:     General: Skin is warm and dry.    "   Findings: No erythema or rash.   Neurological:      Mental Status: He is alert.   Psychiatric:         Behavior: Behavior normal.        Significant Labs: All pertinent labs within the past 24 hours have been reviewed.    Significant Imaging: I have reviewed all pertinent imaging results/findings within the past 24 hours.

## 2023-07-27 NOTE — PROGRESS NOTES
[facility] - Cardiac Medical ICU  Lung Transplant  Progress Note - Critical Care    Patient Name: Martin Hendrix Jr.  MRN: [id number]  Admission Date: 7/25/2023  Hospital Length of Stay: 2 days  Post-Operative Day: 2165  Attending Physician: Samantha Bill DO  Primary Care Provider: Michel Henley MD     Subjective:     Interval History: Had HD yesterday with 3L removed.  NAEON.  On HFNC.  Alert and oriented    Continuous Infusions:   dexmedeTOMIDine (Precedex) infusion (titrating) Stopped (07/27/23 0742)     Scheduled Meds:   sodium chloride 0.9%   Intravenous Once    aspirin  81 mg Oral Daily    atorvastatin  20 mg Oral Daily    azithromycin  500 mg Intravenous Q24H    calcium-vitamin D3  1 tablet Oral BID    cinacalcet  30 mg Oral Daily with breakfast    everolimus (immunosuppressive)  0.75 mg Oral QHS    everolimus (immunosuppressive)  1.5 mg Oral Daily    famotidine  20 mg Oral Daily    folic acid  1,000 mcg Oral Daily    heparin (porcine)  5,000 Units Subcutaneous Q8H    insulin aspart U-100  4 Units Subcutaneous TIDWM    insulin detemir U-100  12 Units Subcutaneous QHS    magnesium chloride  128 mg Oral BID    meropenem (MERREM) IVPB  500 mg Intravenous Q24H    methylPREDNISolone sodium succinate injection  125 mg Intravenous Q6H    sulfamethoxazole-trimethoprim 400-80mg  1 tablet Oral Every Mon, Wed, Fri    tacrolimus  1.5 mg Oral Daily PM    tacrolimus  2 mg Oral Daily AM    vitamin D  2,000 Units Oral Daily     PRN Meds:acetaminophen, glucagon (human recombinant), glucose, glucose, insulin aspart U-100, morphine, ondansetron, sodium chloride 0.9%, Pharmacy to dose Vancomycin consult **AND** vancomycin - pharmacy to dose    Review of patient's allergies indicates:  No Known Allergies    Review of Systems   Constitutional:  Positive for activity change and fatigue.   Respiratory:  Positive for shortness of breath. Negative for cough, chest tightness and wheezing.     Gastrointestinal:  Negative for diarrhea, nausea and vomiting.   Genitourinary:  Positive for decreased urine volume.   Allergic/Immunologic: Positive for immunocompromised state.   Psychiatric/Behavioral:  Negative for confusion. The patient is nervous/anxious (at times).    Objective:        Physical Exam  Vitals reviewed.   Constitutional:       General: He is awake.      Appearance: He is well-developed. He is ill-appearing.      Interventions: Nasal cannula in place.   HENT:      Head: Normocephalic and atraumatic.   Eyes:      Extraocular Movements: Extraocular movements intact.      Conjunctiva/sclera: Conjunctivae normal.   Cardiovascular:      Rate and Rhythm: Normal rate and regular rhythm.      Heart sounds: Normal heart sounds.   Pulmonary:      Effort: Tachypnea and accessory muscle usage (at times) present. No respiratory distress.      Breath sounds: Examination of the right-upper field reveals rhonchi. Examination of the left-upper field reveals rhonchi. Decreased breath sounds and rhonchi present. No wheezing.   Abdominal:      General: Bowel sounds are normal.      Palpations: Abdomen is soft.      Tenderness: There is no abdominal tenderness.   Musculoskeletal:         General: Normal range of motion.   Skin:     General: Skin is warm and dry.   Neurological:      Mental Status: He is alert and oriented to person, place, and time.   Psychiatric:         Mood and Affect: Mood normal.         Speech: Speech normal.         Behavior: Behavior is cooperative.         Thought Content: Thought content normal.         Cognition and Memory: Cognition and memory normal.         Judgment: Judgment normal.              Vital Signs (Most Recent):  Temp: 97.4 °F (36.3 °C) (07/27/23 0701)  Pulse: 86 (07/27/23 1000)  Resp: (!) 34 (07/27/23 1000)  BP: (!) 141/78 (07/27/23 1000)  SpO2: 100 % (07/27/23 1000) Vital Signs (24h Range):  Temp:  [97.4 °F (36.3 °C)-99.1 °F (37.3 °C)] 97.4 °F (36.3 °C)  Pulse:  [72-90]  86  Resp:  [16-39] 34  SpO2:  [81 %-100 %] 100 %  BP: (121-194)/(67-98) 141/78     Weight: 86.6 kg (191 lb)  Body mass index is 27.41 kg/m².      Intake/Output Summary (Last 24 hours) at 7/27/2023 1136  Last data filed at 7/27/2023 0800  Gross per 24 hour   Intake 1113.67 ml   Output 3500 ml   Net -2386.33 ml       Ventilator Data:     Oxygen Concentration (%):  [28-37] 37        Hemodynamic Parameters:       Lines/Drains:       Peripheral IV - Single Lumen 07/25/23 2306 20 G Posterior;Right Hand (Active)   Site Assessment Clean;Dry;Intact;No redness;No swelling 07/27/23 0701   Extremity Assessment Distal to IV No abnormal discoloration;No redness;No swelling;No warmth 07/27/23 0701   Line Status Infusing 07/27/23 0701   Dressing Status Clean;Dry;Intact 07/27/23 0701   Dressing Intervention Integrity maintained 07/27/23 0701   Dressing Change Due 07/29/23 07/27/23 0701   Site Change Due 07/29/23 07/27/23 0701   Reason Not Rotated Not due 07/27/23 0701   Number of days: 1            Peripheral IV - Single Lumen 07/25/23 2335 20 G Right Antecubital (Active)   Site Assessment Clean;Dry;Intact;No redness;No swelling 07/27/23 0701   Extremity Assessment Distal to IV No abnormal discoloration;No redness;No swelling;No warmth 07/27/23 0701   Line Status Infusing 07/27/23 0701   Dressing Status Clean;Dry;Intact 07/27/23 0701   Dressing Intervention Integrity maintained 07/27/23 0701   Dressing Change Due 07/29/23 07/27/23 0701   Site Change Due 07/29/23 07/27/23 0701   Reason Not Rotated Not due 07/27/23 0701   Number of days: 1            Hemodialysis AV Fistula Left forearm (Active)   Needle Size 15ga 07/26/23 1515   Site Assessment Clean;Dry;Intact;No redness;No swelling 07/27/23 0701   Patency Thrill;Present 07/27/23 0701   Status Deaccessed 07/27/23 0701   Flows Good 07/27/23 0701   Dressing Intervention Integrity maintained 07/27/23 0701   Dressing Status Clean;Dry;Intact 07/27/23 0701   Site Condition No  complications 07/26/23 1515   Dressing Gauze 07/27/23 0701   Number of days:        Significant Labs:  CBC:  Recent Labs   Lab 07/27/23  0308   WBC 6.97   RBC 3.35*   HGB 10.1*   HCT 32.3*      MCV 96   MCH 30.1   MCHC 31.3*     BMP:  Recent Labs   Lab 07/25/23  0455 07/25/23  1734 07/27/23  0308   GLUCOSE 208*  --   --       < > 134*   K 4.7   < > 4.9   CL  --    < > 100   CO2 33*   < > 21*   BUN 48*   < > 27*   CREATININE 10.71*   < > 4.8*   CALCIUM 8.5*   < > 9.5    < > = values in this interval not displayed.      Tacrolimus Levels:  Recent Labs   Lab 07/27/23  0541   TACROLIMUS 8.3     Microbiology:  Microbiology Results (last 7 days)       Procedure Component Value Units Date/Time    Cryptococcal antigen, blood [[id number]] Collected: 07/27/23 0310    Order Status: Completed Specimen: Blood, Venous Updated: 07/27/23 0847     Cryptococcal Ag, Blood Negative    Respiratory Infection Panel (PCR), Nasopharyngeal [[id number]] Collected: 07/25/23 2131    Order Status: Completed Specimen: Nasopharyngeal Swab Updated: 07/26/23 0232     Respiratory Infection Panel Source NP Swab     Adenovirus Not Detected     Coronavirus 229E, Common Cold Virus Not Detected     Coronavirus HKU1, Common Cold Virus Not Detected     Coronavirus NL63, Common Cold Virus Not Detected     Coronavirus OC43, Common Cold Virus Not Detected     Comment: The Coronavirus strains detected in this test cause the common cold.  These strains are not the COVID-19 (novel Coronavirus)strain   associated with the respiratory disease outbreak.          SARS-CoV2 (COVID-19) Qualitative PCR Not Detected     Human Metapneumovirus Not Detected     Human Rhinovirus/Enterovirus Not Detected     Influenza B Not Detected     Parainfluenza Virus 1 Not Detected     Parainfluenza Virus 2 Not Detected     Parainfluenza Virus 3 Not Detected     Parainfluenza Virus 4 Not Detected     Respiratory Syncytial Virus Not Detected     Bordetella Parapertussis  (NT1955) Not Detected     Bordetella pertussis (ptxP) Not Detected     Chlamydia pneumoniae Not Detected     Mycoplasma pneumoniae Not Detected    Narrative:      For all other respiratory sources, order [id number] -  Respiratory Viral Panel by PCR    Respiratory Infection Panel (PCR), Nasopharyngeal [[id number]] Collected: 07/25/23 2131    Order Status: Completed Specimen: Nasopharyngeal Swab Updated: 07/25/23 2131     Respiratory Infection Panel Source NP Swab    Narrative:      For all other respiratory sources, order [id number] -  Respiratory Viral Panel by PCR    Culture, Respiratory with Gram Stain [[id number]]     Order Status: No result Specimen: Respiratory             I have reviewed all pertinent labs within the past 24 hours.    Diagnostic Results:    Labs reviewed          Assessment/Plan:     Pulmonary  * Acute on chronic respiratory failure with hypoxia  Patient with Hypercapnic and Hypoxic Respiratory failure which is Acute on chronic.  he is on home oxygen at 2 LPM. Supplemental oxygen was provided and noted- Oxygen Concentration (%):  [28-37] 37    .   Signs/symptoms of respiratory failure include- tachypnea, increased work of breathing, respiratory distress and use of accessory muscles. Contributing diagnoses includes - Pleural effusion, Pneumonia and complications of lung transplant Labs and images were reviewed. Patient Has recent ABG, which has been reviewed. Will treat underlying causes and adjust management of respiratory failure as follows-     CXR with noted right sided pleural effusion.  Bedside US showed small amount of fluid noted to right side.     --continue broad spectrum antibiotics and ID consult  --Continue Bi-PAP at night, currently on HFNC at 50L/37% will wean as tolerated  --RIP negative, follow up on aspergillus antigen, CMP PCR, and fungitell  --Nephrology to remove fluid again today.  Removed 3L on 7/26    Lung replaced by transplant  Underwent BLT on 8/22/2017 for pulmonary  hypertension.  Now with CLAD.  Continue OIP and immunosuppression     Complication of lung transplant  Patient with known CLAD/JONELLE.  Dyspnea has been worsening over the last several months. Patient is a DNR.    Renal/  ESRD (end stage renal disease) on dialysis  Per Nephrology.  Plann for HD today for volume removal    ID  Prophylactic antibiotic  Continue Bactrim    Immunology/Multi System  Immunosuppression  Continue tacrolimus, everolimus and cortitcosteroid (IV solumedrol 125 mg q6).  Will monitor tacrolimus levels while inpatient    Endocrine  Type 2 diabetes mellitus with hyperglycemia, with long-term current use of insulin  Endocrine consult.  Appreciate recs    Palliative Care  Palliative care encounter  Extensive conversation this morning between Dr. Bill, patient, and patient's family regarding overall prognosis and plan.  Patient is DNR.  Palliative Care consulted.        Preventive Measures: Nutrition: Goal: PO diet, Stress Ulcer: continue prophyllaxis, DVT: continue prophyllaxis, Head of Bet: elevated, Reposition: q2 with assistance    Counseling/Consultation:I discussed the case with Dr. Bill.    Critical Care Time greater than: 30 Minutes     Irwin Celestin NP  Lung Transplant  [facility] - Cardiac Medical ICU

## 2023-07-27 NOTE — PLAN OF CARE
[facility] - Cardiac Medical ICU  Initial Discharge Assessment       Primary Care Provider: Michel Henley MD    Admission Diagnosis: Bilateral pneumonia [J18.9]    Admission Date: 7/25/2023  Expected Discharge Date: 8/1/2023    Transition of Care Barriers: None    Payor: HUMANA MANAGED MEDICARE / Plan: HUMANA SNP HMO PPO SPECIAL NEEDS / Product Type: Medicare Advantage /     Extended Emergency Contact Information  Primary Emergency Contact: barbra rain  Mobile Phone: [phone]  Relation: Brother  Secondary Emergency Contact: Sybil Rain  Address: [id number] [place] [address]  Home Phone: [phone]  Mobile Phone: [phone]  Relation: Daughter    Discharge Plan A: Other (TBD)  Discharge Plan B: Other (TBD)      [facility] [address] [address]  Phone: [phone] Fax: [phone]    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 Peacock    DM (diabetes mellitus) 11/2017     02/22/2021 Insulin x 2017    Hypertension     On home oxygen therapy     KRISTYN on CPAP     Osteopenia     Pancreatitis 2009    hospitalized    Pulmonary hypertension     Pure hypercholesterolemia 11/15/2017    Sarcoidosis     Shortness of breath     Type 2 diabetes mellitus 11/5/2017         CM met with patient and Sybil Rain (daughter) [phone], Albina Godinez (sister) [phone] in room for Discharge Planning Assessment.  Patient was able to answer questions.  Per patient, he lives alone in a 1-story home with 1 small step(s) to enter.   Per patient, he was  semi-dependent with ADLS(sometimes needing help with dressing and bathing) and used no DME for ambulation. Per patient, he is on dialysis with [facility], and does not take Coumadin. Patient will have help from Sybil Hendrix (daughter) [phone], Woodrow Hendrix (brother) [phone], Albina Godinez (sister) [phone] upon discharge.   Discharge Planning Booklet given to patient/family and discussed.  All questions addressed.  CM will follow for needs.    Roxanne WILLISW from transplant advised transplant is primary team and she is the transplant  that is following patient. She will cover discharge needs.      Initial Assessment (most recent)       Adult Discharge Assessment - 07/27/23 1212          Discharge Assessment    Assessment Type Discharge Planning Assessment     Confirmed/corrected address, phone number and insurance Yes     Confirmed Demographics Correct on Facesheet     Source of Information patient;family     When was your last doctors appointment? 06/07/23     Communicated DERICK with patient/caregiver Date not available/Unable to determine     Reason For Admission Bilateral pneumonia, acute on chronic respiratory failure with hypoxia     People in Home alone     Facility Arrived From: [place]     Do you expect to return to your current living situation? Yes     Do you have help at home or someone to help you manage your care at home? Yes     Who are your caregiver(s) and their phone number(s)? Sybil Hendrix (daughter) [phone], Woodrow Hendrix (brother) [phone], Albina Herbert (sister) [phone]     Prior to hospitilization cognitive status: Alert/Oriented     Current cognitive status: Alert/Oriented     Dressing/Bathing bathing difficulty, assistance 1 person;dressing difficulty, assistance 1 person     Dressing/Bathing Management periodically needs assistance sister and brother assist and daughter when she is in town.     Equipment Currently Used at Home none      Readmission within 30 days? No     Patient currently being followed by outpatient case management? No     Do you currently have service(s) that help you manage your care at home? No     Do you take prescription medications? Yes     Do you have prescription coverage? Yes     Coverage HUMANA MANAGED MEDICARE - HUMANA [facility] HMO PPO SPECIAL NEEDS  //  [place]     Do you have any problems affording any of your prescribed medications? No     Is the patient taking medications as prescribed? yes     Who is going to help you get home at discharge? Sybil Bullock (daughter) [phone]     How do you get to doctors appointments? family or friend will provide     Are you on dialysis? Yes     Dialysis Name and Scheduled days [facility]     Do you take coumadin? No     Discharge Plan A Other   TBD    Discharge Plan B Other   TBD    Discharge Plan discussed with: Patient;Adult children;Sibling     Name(s) and Number(s) Sybil Hendrix (daughter) [phone], Albina Godinez (sister) [phone]     Transition of Care Barriers None        Physical Activity    On average, how many days per week do you engage in moderate to strenuous exercise (like a brisk walk)? 0 days     On average, how many minutes do you engage in exercise at this level? 0 min        Financial Resource Strain    How hard is it for you to pay for the very basics like food, housing, medical care, and heating? Not very hard        Housing Stability    In the last 12 months, was there a time when you were not able to pay the mortgage or rent on time? No     In the last 12 months, how many places have you lived? 1     In the last 12 months, was there a time when you did not have a steady place to sleep or slept in a shelter (including now)? No        Transportation Needs    In the past 12 months, has lack of transportation kept you from medical appointments or from getting medications? No     In the past 12 months, has lack of transportation kept  you from meetings, work, or from getting things needed for daily living? No        Food Insecurity    Within the past 12 months, you worried that your food would run out before you got the money to buy more. Never true     Within the past 12 months, the food you bought just didn't last and you didn't have money to get more. Never true        Stress    Do you feel stress - tense, restless, nervous, or anxious, or unable to sleep at night because your mind is troubled all the time - these days? Very much        Social Connections    In a typical week, how many times do you talk on the phone with family, friends, or neighbors? More than three times a week     How often do you get together with friends or relatives? More than three times a week     How often do you attend Yazidism or Yazidi services? 1 to 4 times per year     Do you belong to any clubs or organizations such as Yazidism groups, unions, fraternal or athletic groups, or school groups? No     How often do you attend meetings of the clubs or organizations you belong to? Never     Are you , , , , never , or living with a partner?         Alcohol Use    Q1: How often do you have a drink containing alcohol? Never     Q2: How many drinks containing alcohol do you have on a typical day when you are drinking? Patient does not drink     Q3: How often do you have six or more drinks on one occasion? Never                            PCP:  Michel Henley MD  [phone]        Pharmacy:    [facility] [address] [place] [address]  Phone: [phone] Fax: [phone]    [facility]/pharmacy #[id number] - [place] - [address]  [place] [address]  Phone: [phone] Fax: [phone]        Emergency Contacts:  Extended Emergency Contact Information  Primary Emergency Contact: barbra rain  Mobile Phone: [phone]  Relation: Brother  Secondary Emergency Contact:  Sybil Hendrix  Address: [facility] [address]           [facility] [place] [address] [place] [address] [place]  Home Phone: [phone]  Mobile Phone: [phone]  Relation: Daughter      Insurance:    Payor: HUMANA MANAGED MEDICARE / Plan: HUMANA [facility] HMO PPO SPECIAL NEEDS / Product Type: Medicare Advantage /     Connie Kim RN     [phone]      07/27/2023  2:25 PM

## 2023-07-27 NOTE — ASSESSMENT & PLAN NOTE
Patient with Hypercapnic and Hypoxic Respiratory failure which is Acute on chronic.  he is on home oxygen at 2 LPM. Supplemental oxygen was provided and noted- Oxygen Concentration (%):  [28-37] 37    .   Signs/symptoms of respiratory failure include- tachypnea, increased work of breathing, respiratory distress and use of accessory muscles. Contributing diagnoses includes - Pleural effusion, Pneumonia and complications of lung transplant Labs and images were reviewed. Patient Has recent ABG, which has been reviewed. Will treat underlying causes and adjust management of respiratory failure as follows-     CXR with noted right sided pleural effusion.  Bedside US showed small amount of fluid noted to right side.     --continue broad spectrum antibiotics and ID consult  --Continue Bi-PAP at night, currently on HFNC at 50L/37% will wean as tolerated  --RIP negative, follow up on aspergillus antigen, CMP PCR, and fungitell  --Nephrology to remove fluid again today.  Removed 3L on 7/26

## 2023-07-27 NOTE — ASSESSMENT & PLAN NOTE
62-year-old male with sarcoidosis s/p BOLT 8/22/2017 on tacro, everolimus, CMV UL-97 resistant infection s/p course foscarnet 2019, ESRD on HD 2021, CLAD c/b chronic respiratory failure on 2L NC, presented 7/25/2023 with acute on chronic respiratory failure, critically ill on HFNC.     Work-up thus far includes negative RIP.     Recommendations:  Follow-up aspergillus Ag, fungitell, CMV VL  Continue vancomycin IV  Consider de-escalating meropenem to cefepime IV  On TMP-SMX prophy

## 2023-07-27 NOTE — ASSESSMENT & PLAN NOTE
62 y.o. White Male ESRD-HD M-W-F presents to ED on 7/25/2023 with diagnosis of: Bilateral pneumonia [J18.9]   Nephrology consulted for inpatient ESRD-HD management    ESRD on IHD  TTS  -  Last iHD 7/25  Out patient HD [facility] - [facility]/ Dr. Zenia Peacock.  Duration of outpatient dialysis session - 3.5 hrs  EDW: 87.5 kg   Access: LUE brachiocephalic fistula, created 10/13/2021 by Dr Thomason    Assessment:   - No emergent need for HD at this particular time. HD performed on yesterday with 3 L removed. Will FU with primary team regarding the need additional volume removal.   - Continue to monitor intake and output  - Please avoid gadolinium, fleets, phos-based laxatives, NSAIDs  - Dialysis thrice weekly unless more urgent indications arise. Will evaluate RRT requirements Daily.    Anemia of ESRD   Recent Labs   Lab 07/25/23  1734 07/26/23  0333 07/27/23  0308   WBC 7.78 7.37 6.97   HGB 10.2* 9.3* 10.1*   HCT 34.3* 30.9* 32.3*    230 295     Lab Results   Component Value Date    FESATURATED 17 (L) 03/14/2022    FERRITIN 1,619 (H) 03/14/2022       - Goal in ESRD is Hgb of 10-11. Hgb 10.1.  - No Epogen   Mineral Bone Disease in ESRD   Lab Results   Component Value Date    .5 (H) 02/07/2022    CALCIUM 9.5 07/27/2023    CAION 1.20 07/25/2023    PHOS 5.2 (H) 07/27/2023       - F/U PO4, Mg, Calcium. And albumin levels.   - Renal diet with protein intake goal 1.5 g/kg/d with 1 L fluid restriction when appropriate  - Continue home phos binder when appropriate, phos 5.2  - Daily renal panel so that phos and albumin is monitored daily.

## 2023-07-27 NOTE — ASSESSMENT & PLAN NOTE
- sp lung transplant x 2 in 2017 2/2 sarcoidosis and pulmonary HTN  - lung transplant service following

## 2023-07-27 NOTE — PLAN OF CARE
Advance Care Planning   [facility] - Cardiac Medical ICU  Palliative Care   Psychosocial Assessment    Patient Name: Martin Hendrix Jr.  MRN: [id number]  Admission Date: 7/25/2023  Hospital Length of Stay: 2 days  Code Status: DNR   Attending Provider: Samantha Bill DO  Palliative Care Provider: Zenia Klein NP   Primary Care Physician: Michel Henley MD  Principal Problem:Acute on chronic respiratory failure with hypoxia    Reason for Referral:  Advanced Care Planning and Psychosocial Support       Present during Interview: patient, daughter/Sybil, VARINDER/Sheldon, Sister/Shanell .      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 Peacock    DM (diabetes mellitus) 11/2017     02/22/2021 Insulin x 2017    Hypertension     On home oxygen therapy     KRISTYN on CPAP     Osteopenia     Pancreatitis 2009    hospitalized    Pulmonary hypertension     Pure hypercholesterolemia 11/15/2017    Sarcoidosis     Shortness of breath     Type 2 diabetes mellitus 11/5/2017     Mental Health/Substance Use History: None identified   Risk of Abuse, neglect or exploitation: None identified   Current or Previous Trauma and/or evidence of PTSD: None identified   Non-traditional Health practices: None identified     Understanding of diagnosis and prognosis: Fair   Experience/Comfort level with health care system: Fair     Patients Mental Status: Alert and oriented to person, place, time and situation with stable mood.     Socio-Economic Factors/Resources:  Address: [place] [address]  Phone Number: [phone] (home)     Marital Status:    Household composition: Alone    Children: One daughter     Patient/Family perceptions about Caregiving Needs; availability and capacity:SW explained the limitations of care provided in home through insurance.  Patient's daughter reports she is willing to move patient into her home near [place], however patient would be alone when daughter and VARINDER work. Team to continue to work with family regarding best options as patient's plan of care develops.     Family Dynamics/Relationships: Healthy     Patient/Family Strengths/Resilience: Patient and family openly engage in discussions of patient's goals and potential plans of care.   Patient/Family Coping: Appropriately     Activities of Daily Living: Independent prior to admit.   Support Systems-Family & Community (Home Health, HME etc): outpatient HD     Transportation:  no    Work/Education History: disability  Self-Care Activities/Hobbies: Watching TV and movies, visiting daughter in [place]      History: no    Financial Resources:Medicare      Advanced Care Planning & Legal Concerns:   Advanced Directives/Living Will: no  [place]: no   Planning:  no    Power of : no     Emergency Contacts: Daughter/Sybil [phone]    Spirituality, Culture & Coping Mechanisms:  F- Sophia and Belief: Buddhist      I - Importance: High    C - Community/Culture Values: Patient attended [employer] prior to his illness     A - Address in Care: Patient would benefit from  services       Goals/Hopes/Expectations:  Patient hopes to receive a new lung and gain independence.   Fears/Anxiety/Concerns: Family poses questions regarding care options for patient in the event he cannot regain independence.       Complicated Bereavement Risk Assessment Tool (CBRAT)  Reference:  [facility] Clinical Practice Group (May 2016). Bereavement Risk Screening and Management Guidelines.  Retrieved from:  http://www.[facility].com.au/wp-content/uploads//JLVZP-Cbpfurjbcne-Bpwmflqvb-and-Management-Guideline--.pdf      Bereaved Client Characteristics   Under 18      yes  Was a Twin   no  Young Spouse   no  Elderly Spouse    no  Isolated    no  Lacks Meaningful Social Support   no  Dissatisfied with help available during illness   no  New to Financial [place] no  New to Decision-Making   no    Illness  Inherited Disorder   no  Stigmatized Disease in the family/community   no  Lengthy/Burdensome   no     Bereaved Client's History of Loss   Cumulative Multiple Losses   no  Previous Mental Health Illnesses   no  Current Mental Health Illness   no  Other Significant Health Issues   no   Migrant/Refugee   no Death  Sudden or Unexpected   no  Traumatic Circumstances Associated with Death   no  Significant Cultural/Social Burdens as a result of Death   yes   Relationship with   Profound Lifelong Partner   no  Highly Dependent    no  Antagonistic   no  Ambivalent   no  Deeply Connected   yes  Culturally Defined   yes   Risk Factors Scores  0-2  Low  3-5  Moderate  5+  High  All persons scoring moderate to high presume to be at risk**    (** It is acknowledged that protective factors and resilience may outweigh apparent risk factors.      Total Risk Factors Score:   Low to moderate.  Patient's daughter has two young children (ages 10 and 7) and pregnant with her third due this December.          Advance Care Planning     Date: 2023    [facility]  Team engaged the patient, daughter, and sister in a voluntary conversation about advance care planning and we specifically addressed what the goals of care would be moving forward, in light of the patient's change in clinical status, specifically respiratory failure.  We did not specifically address the patient's likely prognosis, which is  TBD by primary team .  We explored the patient's values and preferences for future care.  The patient endorses that  "what is most important right now is to focus on remaining as independent as possible and symptom/pain control    Accordingly, we have decided that the best plan to meet the patient's goals includes continuing with treatment    I did not explain the role for hospice care at this stage of the patient's illness, including its ability to help the patient live with the best quality of life possible.  We will not be making a hospice referral.    I spent a total of 30 minutes engaging the patient in this advance care planning discussion.           Plan of Care:   SW accompanied NP during initial visit with patient, daughter/Sybil, son-in-law/Sheldon, and sister/Shanell.  Team introduced services of Palliative Medicine.  Presently patient hopes to receive a "new lung" and regain independence.  Patient notes if this is not possible he hopes to "be comfortable". When questioned what patient would consider comfortable, patient reports to "move around". Family inquired about care services available at home. SW briefly outlined the services provided through insurance. Daughter states she has offered to move patient into her home near [place] however patient would still require assistance when she and her spouse work. Team noted patient's plan for discharge would be discussed further as patient's plan of care develops. Patient and family deny further psychosocial concerns. SW remains available to provide emotional support and psychosocial assistance. SW will continue to follow.    Marybel Keen [facility]    Palliative Medicine                  "

## 2023-07-27 NOTE — SUBJECTIVE & OBJECTIVE
Interval History:   HD completed on yesterday. Net UF 3 L. Net negative 2.3 L/24 hrs. Oxygen requirements improved from continuous BiPAP to comfort flow 50 L / 35 %.   More oriented on exam this AM.     Review of patient's allergies indicates:  No Known Allergies  Current Facility-Administered Medications   Medication Frequency    acetaminophen tablet 1,000 mg Q6H PRN    aspirin EC tablet 81 mg Daily    atorvastatin tablet 20 mg Daily    azithromycin (ZITHROMAX) 500 mg in dextrose 5 % (D5W) 250 mL IVPB (Vial-Mate) Q24H    calcium-vitamin D3 500 mg-5 mcg (200 unit) per tablet 1 tablet BID    cinacalcet tablet 30 mg Daily with breakfast    dexmedetomidine (PRECEDEX) 400mcg/100mL 0.9% NaCL infusion Continuous    everolimus (immunosuppressive) tablet 0.75 mg QHS    everolimus (immunosuppressive) tablet 1.5 mg Daily    famotidine tablet 20 mg Daily    folic acid tablet 1,000 mcg Daily    glucagon (human recombinant) injection 1 mg PRN    glucose chewable tablet 16 g PRN    glucose chewable tablet 24 g PRN    heparin (porcine) injection 5,000 Units Q8H    insulin aspart U-100 pen 1-10 Units QID (AC + HS) PRN    insulin aspart U-100 pen 4 Units TIDWM    insulin detemir U-100 (Levemir) pen 12 Units QHS    magnesium chloride TBEC tablet TbEC 128 mg BID    meropenem (MERREM) 500 mg in sodium chloride 0.9 % 100 mL IVPB (MB+) Q24H    methylPREDNISolone sodium succinate injection 125 mg Q6H    morphine injection 2 mg Q4H PRN    ondansetron disintegrating tablet 8 mg Q8H PRN    sodium chloride 0.9% flush 10 mL PRN    sulfamethoxazole-trimethoprim 400-80mg per tablet 1 tablet Every Mon, Wed, Fri    tacrolimus capsule 1.5 mg Daily PM    tacrolimus capsule 2 mg Daily AM    vancomycin - pharmacy to dose pharmacy to manage frequency    vitamin D 1000 units tablet 2,000 Units Daily     Facility-Administered Medications Ordered in Other Encounters   Medication Frequency    0.9%  NaCl infusion Continuous    cefazolin (ANCEF) 2 gram in  dextrose 5% 50 mL IVPB (premix) On Call Procedure       Objective:     Vital Signs (Most Recent):  Temp: 97.4 °F (36.3 °C) (07/27/23 0701)  Pulse: 86 (07/27/23 1000)  Resp: (!) 34 (07/27/23 1000)  BP: (!) 141/78 (07/27/23 1000)  SpO2: 100 % (07/27/23 1000) Vital Signs (24h Range):  Temp:  [97.4 °F (36.3 °C)-99.1 °F (37.3 °C)] 97.4 °F (36.3 °C)  Pulse:  [72-90] 86  Resp:  [16-39] 34  SpO2:  [81 %-100 %] 100 %  BP: (121-194)/(67-98) 141/78     Weight: 86.6 kg (191 lb) (07/27/23 0300)  Body mass index is 27.41 kg/m².  Body surface area is 2.07 meters squared.    I/O last 3 completed shifts:  In: 1552.2 [P.O.:240; I.V.:434.3; Other:250; IV Piggyback:627.9]  Out: 3500 [Other:3500]     Physical Exam  Vitals and nursing note reviewed.   Constitutional:       General: He is not in acute distress.     Appearance: Normal appearance. He is obese. He is ill-appearing.      Comments: +BiPAP   HENT:      Head: Normocephalic and atraumatic.      Right Ear: External ear normal.      Left Ear: External ear normal.      Nose: Nose normal.      Mouth/Throat:      Mouth: Mucous membranes are moist.      Pharynx: Oropharynx is clear.   Eyes:      General: No scleral icterus.     Extraocular Movements: Extraocular movements intact.      Conjunctiva/sclera: Conjunctivae normal.   Cardiovascular:      Rate and Rhythm: Normal rate and regular rhythm.      Heart sounds: Normal heart sounds.      Arteriovenous access: Left arteriovenous access is present.  Pulmonary:      Breath sounds: Rhonchi and rales present. No wheezing.   Abdominal:      General: Abdomen is flat. Bowel sounds are normal. There is no distension.      Palpations: Abdomen is soft.      Tenderness: There is no abdominal tenderness.   Musculoskeletal:         General: No swelling or deformity. Normal range of motion.      Cervical back: Normal range of motion.      Right lower leg: No edema.      Left lower leg: No edema.   Skin:     General: Skin is warm and dry.    Neurological:      General: No focal deficit present.      Mental Status: He is alert.   Psychiatric:         Mood and Affect: Mood normal.        Significant Labs:  CBC:   Recent Labs   Lab 07/27/23  0308   WBC 6.97   RBC 3.35*   HGB 10.1*   HCT 32.3*      MCV 96   MCH 30.1   MCHC 31.3*     CMP:   Recent Labs   Lab 07/27/23  0308   *   CALCIUM 9.5   ALBUMIN 3.3*   PROT 7.8   *   K 4.9   CO2 21*      BUN 27*   CREATININE 4.8*   ALKPHOS 95   ALT 13   AST 19   BILITOT 0.3     All labs within the past 24 hours have been reviewed.

## 2023-07-27 NOTE — SUBJECTIVE & OBJECTIVE
Subjective:     Interval History: Had HD yesterday with 3L removed.  NAEON.  On HFNC.  Alert and oriented    Continuous Infusions:   dexmedeTOMIDine (Precedex) infusion (titrating) Stopped (07/27/23 0742)     Scheduled Meds:   sodium chloride 0.9%   Intravenous Once    aspirin  81 mg Oral Daily    atorvastatin  20 mg Oral Daily    azithromycin  500 mg Intravenous Q24H    calcium-vitamin D3  1 tablet Oral BID    cinacalcet  30 mg Oral Daily with breakfast    everolimus (immunosuppressive)  0.75 mg Oral QHS    everolimus (immunosuppressive)  1.5 mg Oral Daily    famotidine  20 mg Oral Daily    folic acid  1,000 mcg Oral Daily    heparin (porcine)  5,000 Units Subcutaneous Q8H    insulin aspart U-100  4 Units Subcutaneous TIDWM    insulin detemir U-100  12 Units Subcutaneous QHS    magnesium chloride  128 mg Oral BID    meropenem (MERREM) IVPB  500 mg Intravenous Q24H    methylPREDNISolone sodium succinate injection  125 mg Intravenous Q6H    sulfamethoxazole-trimethoprim 400-80mg  1 tablet Oral Every Mon, Wed, Fri    tacrolimus  1.5 mg Oral Daily PM    tacrolimus  2 mg Oral Daily AM    vitamin D  2,000 Units Oral Daily     PRN Meds:acetaminophen, glucagon (human recombinant), glucose, glucose, insulin aspart U-100, morphine, ondansetron, sodium chloride 0.9%, Pharmacy to dose Vancomycin consult **AND** vancomycin - pharmacy to dose    Review of patient's allergies indicates:  No Known Allergies    Review of Systems   Constitutional:  Positive for activity change and fatigue.   Respiratory:  Positive for shortness of breath. Negative for cough, chest tightness and wheezing.    Gastrointestinal:  Negative for diarrhea, nausea and vomiting.   Genitourinary:  Positive for decreased urine volume.   Allergic/Immunologic: Positive for immunocompromised state.   Psychiatric/Behavioral:  Negative for confusion. The patient is nervous/anxious (at times).    Objective:        Physical Exam  Vitals reviewed.   Constitutional:        General: He is awake.      Appearance: He is well-developed. He is ill-appearing.      Interventions: Nasal cannula in place.   HENT:      Head: Normocephalic and atraumatic.   Eyes:      Extraocular Movements: Extraocular movements intact.      Conjunctiva/sclera: Conjunctivae normal.   Cardiovascular:      Rate and Rhythm: Normal rate and regular rhythm.      Heart sounds: Normal heart sounds.   Pulmonary:      Effort: Tachypnea and accessory muscle usage (at times) present. No respiratory distress.      Breath sounds: Examination of the right-upper field reveals rhonchi. Examination of the left-upper field reveals rhonchi. Decreased breath sounds and rhonchi present. No wheezing.   Abdominal:      General: Bowel sounds are normal.      Palpations: Abdomen is soft.      Tenderness: There is no abdominal tenderness.   Musculoskeletal:         General: Normal range of motion.   Skin:     General: Skin is warm and dry.   Neurological:      Mental Status: He is alert and oriented to person, place, and time.   Psychiatric:         Mood and Affect: Mood normal.         Speech: Speech normal.         Behavior: Behavior is cooperative.         Thought Content: Thought content normal.         Cognition and Memory: Cognition and memory normal.         Judgment: Judgment normal.              Vital Signs (Most Recent):  Temp: 97.4 °F (36.3 °C) (07/27/23 0701)  Pulse: 86 (07/27/23 1000)  Resp: (!) 34 (07/27/23 1000)  BP: (!) 141/78 (07/27/23 1000)  SpO2: 100 % (07/27/23 1000) Vital Signs (24h Range):  Temp:  [97.4 °F (36.3 °C)-99.1 °F (37.3 °C)] 97.4 °F (36.3 °C)  Pulse:  [72-90] 86  Resp:  [16-39] 34  SpO2:  [81 %-100 %] 100 %  BP: (121-194)/(67-98) 141/78     Weight: 86.6 kg (191 lb)  Body mass index is 27.41 kg/m².      Intake/Output Summary (Last 24 hours) at 7/27/2023 1136  Last data filed at 7/27/2023 0800  Gross per 24 hour   Intake 1113.67 ml   Output 3500 ml   Net -2386.33 ml       Ventilator Data:     Oxygen  Concentration (%):  [28-37] 37        Hemodynamic Parameters:       Lines/Drains:       Peripheral IV - Single Lumen 07/25/23 2306 20 G Posterior;Right Hand (Active)   Site Assessment Clean;Dry;Intact;No redness;No swelling 07/27/23 0701   Extremity Assessment Distal to IV No abnormal discoloration;No redness;No swelling;No warmth 07/27/23 0701   Line Status Infusing 07/27/23 0701   Dressing Status Clean;Dry;Intact 07/27/23 0701   Dressing Intervention Integrity maintained 07/27/23 0701   Dressing Change Due 07/29/23 07/27/23 0701   Site Change Due 07/29/23 07/27/23 0701   Reason Not Rotated Not due 07/27/23 0701   Number of days: 1            Peripheral IV - Single Lumen 07/25/23 2335 20 G Right Antecubital (Active)   Site Assessment Clean;Dry;Intact;No redness;No swelling 07/27/23 0701   Extremity Assessment Distal to IV No abnormal discoloration;No redness;No swelling;No warmth 07/27/23 0701   Line Status Infusing 07/27/23 0701   Dressing Status Clean;Dry;Intact 07/27/23 0701   Dressing Intervention Integrity maintained 07/27/23 0701   Dressing Change Due 07/29/23 07/27/23 0701   Site Change Due 07/29/23 07/27/23 0701   Reason Not Rotated Not due 07/27/23 0701   Number of days: 1            Hemodialysis AV Fistula Left forearm (Active)   Needle Size 15ga 07/26/23 1515   Site Assessment Clean;Dry;Intact;No redness;No swelling 07/27/23 0701   Patency Thrill;Present 07/27/23 0701   Status Deaccessed 07/27/23 0701   Flows Good 07/27/23 0701   Dressing Intervention Integrity maintained 07/27/23 0701   Dressing Status Clean;Dry;Intact 07/27/23 0701   Site Condition No complications 07/26/23 1515   Dressing Gauze 07/27/23 0701   Number of days:        Significant Labs:  CBC:  Recent Labs   Lab 07/27/23  0308   WBC 6.97   RBC 3.35*   HGB 10.1*   HCT 32.3*      MCV 96   MCH 30.1   MCHC 31.3*     BMP:  Recent Labs   Lab 07/25/23  0455 07/25/23  1734 07/27/23  0308   GLUCOSE 208*  --   --       < > 134*   K 4.7    < > 4.9   CL  --    < > 100   CO2 33*   < > 21*   BUN 48*   < > 27*   CREATININE 10.71*   < > 4.8*   CALCIUM 8.5*   < > 9.5    < > = values in this interval not displayed.      Tacrolimus Levels:  Recent Labs   Lab 07/27/23  0541   TACROLIMUS 8.3     Microbiology:  Microbiology Results (last 7 days)       Procedure Component Value Units Date/Time    Cryptococcal antigen, blood [[id number]] Collected: 07/27/23 0310    Order Status: Completed Specimen: Blood, Venous Updated: 07/27/23 0847     Cryptococcal Ag, Blood Negative    Respiratory Infection Panel (PCR), Nasopharyngeal [[id number]] Collected: 07/25/23 2131    Order Status: Completed Specimen: Nasopharyngeal Swab Updated: 07/26/23 0232     Respiratory Infection Panel Source NP Swab     Adenovirus Not Detected     Coronavirus 229E, Common Cold Virus Not Detected     Coronavirus HKU1, Common Cold Virus Not Detected     Coronavirus NL63, Common Cold Virus Not Detected     Coronavirus OC43, Common Cold Virus Not Detected     Comment: The Coronavirus strains detected in this test cause the common cold.  These strains are not the COVID-19 (novel Coronavirus)strain   associated with the respiratory disease outbreak.          SARS-CoV2 (COVID-19) Qualitative PCR Not Detected     Human Metapneumovirus Not Detected     Human Rhinovirus/Enterovirus Not Detected     Influenza B Not Detected     Parainfluenza Virus 1 Not Detected     Parainfluenza Virus 2 Not Detected     Parainfluenza Virus 3 Not Detected     Parainfluenza Virus 4 Not Detected     Respiratory Syncytial Virus Not Detected     Bordetella Parapertussis ([id number]) Not Detected     Bordetella pertussis (ptxP) Not Detected     Chlamydia pneumoniae Not Detected     Mycoplasma pneumoniae Not Detected    Narrative:      For all other respiratory sources, order [id number] -  Respiratory Viral Panel by PCR    Respiratory Infection Panel (PCR), Nasopharyngeal [[id number]] Collected: 07/25/23 2131    Order Status: Completed  Specimen: Nasopharyngeal Swab Updated: 07/25/23 2131     Respiratory Infection Panel Source NP Swab    Narrative:      For all other respiratory sources, order [id number] -  Respiratory Viral Panel by PCR    Culture, Respiratory with Gram Stain [[id number]]     Order Status: No result Specimen: Respiratory             I have reviewed all pertinent labs within the past 24 hours.    Diagnostic Results:    Labs reviewed

## 2023-07-28 PROBLEM — Z71.89 GOALS OF CARE, COUNSELING/DISCUSSION: Status: ACTIVE | Noted: 2023-01-01

## 2023-07-28 NOTE — PLAN OF CARE
MICU DAILY GOALS     Family/Goals of care/Code Status   Code Status: DNR    24H Vital Sign Range  Temp:  [97.4 °F (36.3 °C)-98.6 °F (37 °C)]   Pulse:  []   Resp:  [19-34]   BP: (112-167)/(64-88)   SpO2:  [98 %-100 %]      Shift Events   No acute events throughout shift    AWAKE RASS: Goal - RASS Goal: 0-->alert and calm  Actual - RASS (Junior Agitation-Sedation Scale): alert and calm    Restraint necessity: Not necessary   BREATHE SBT: Not intubated    Coordinate A & B, analgesics/sedatives Pain: managed   SAT: Not intubated   Delirium CAM-ICU: Overall CAM-ICU: Negative   Early(intubated/ Progressive (non-intubated) Mobility MOVE Screen: Pass   Activity: Activity Management: Rolling - L1   Feeding/Nutrition Diet order: Diet/Nutrition Received: consistent carb/diabetic diet,     Thrombus DVT prophylaxis: VTE Required Core Measure: Pharmacological prophylaxis initiated/maintained   HOB Elevation Head of Bed (HOB) Positioning: HOB elevated   Ulcer Prophylaxis GI: no   Glucose control managed     Skin Skin assessed during daily assessment:   Yes, No altered skin integrity present.   Bowel Function constipation    Indwelling Catheter Necessity         N/A   De-escalation Antibiotics Yes       VS and assessment per flow sheet, patient progressing towards goals as tolerated, plan of care reviewed with family, all concerns addressed,

## 2023-07-28 NOTE — SUBJECTIVE & OBJECTIVE
Subjective:     Interval History: No acute events overnight.  Oxygen requirements decreasing.  Will transfer to TSU    Continuous Infusions:   dexmedeTOMIDine (Precedex) infusion (titrating) Stopped (07/27/23 0742)     Scheduled Meds:   [START ON 7/29/2023] sodium chloride 0.9%   Intravenous Once    aspirin  81 mg Oral Daily    atorvastatin  20 mg Oral Daily    calcium-vitamin D3  1 tablet Oral BID    ceFEPime (MAXIPIME) IVPB  1 g Intravenous Q24H    cinacalcet  30 mg Oral Daily with breakfast    [START ON 7/29/2023] epoetin greer (PROCRIT) injection  2,000 Units Intravenous Every Tues, Thurs, Sat    everolimus (immunosuppressive)  0.75 mg Oral QHS    everolimus (immunosuppressive)  1.5 mg Oral Daily    famotidine  20 mg Oral Daily    folic acid  1,000 mcg Oral Daily    heparin (porcine)  5,000 Units Subcutaneous Q8H    insulin aspart U-100  4 Units Subcutaneous TIDWM    insulin detemir U-100  12 Units Subcutaneous QHS    magnesium chloride  128 mg Oral BID    methylPREDNISolone sodium succinate injection  125 mg Intravenous Q6H    sulfamethoxazole-trimethoprim 400-80mg  1 tablet Oral Every Mon, Wed, Fri    tacrolimus  1.5 mg Oral Daily PM    tacrolimus  2 mg Oral Daily AM    vancomycin (VANCOCIN) IV (PEDS and ADULTS)  1,000 mg Intravenous Once    vitamin D  2,000 Units Oral Daily     PRN Meds:acetaminophen, glucagon (human recombinant), glucose, glucose, insulin aspart U-100, morphine, ondansetron, sodium chloride 0.9%    Review of patient's allergies indicates:  No Known Allergies    Review of Systems   Constitutional:  Positive for activity change and fatigue.   Respiratory:  Positive for shortness of breath. Negative for cough, chest tightness and wheezing.    Gastrointestinal:  Negative for diarrhea, nausea and vomiting.   Genitourinary:  Positive for decreased urine volume.   Allergic/Immunologic: Positive for immunocompromised state.   Psychiatric/Behavioral:  Negative for confusion. The patient is  nervous/anxious (at times).    Objective:        Physical Exam  Vitals reviewed.   Constitutional:       General: He is awake.      Appearance: He is well-developed.      Interventions: Nasal cannula in place.   HENT:      Head: Normocephalic and atraumatic.   Eyes:      Extraocular Movements: Extraocular movements intact.      Conjunctiva/sclera: Conjunctivae normal.   Cardiovascular:      Rate and Rhythm: Normal rate and regular rhythm.      Heart sounds: Normal heart sounds.   Pulmonary:      Effort: No tachypnea, accessory muscle usage or respiratory distress.      Breath sounds: Examination of the right-upper field reveals rhonchi. Examination of the left-upper field reveals rhonchi. Decreased breath sounds and rhonchi present. No wheezing.   Abdominal:      General: Bowel sounds are normal.      Palpations: Abdomen is soft.      Tenderness: There is no abdominal tenderness.   Musculoskeletal:         General: Normal range of motion.   Skin:     General: Skin is warm and dry.   Neurological:      Mental Status: He is alert and oriented to person, place, and time.   Psychiatric:         Mood and Affect: Mood normal.         Speech: Speech normal.         Behavior: Behavior is cooperative.         Thought Content: Thought content normal.         Cognition and Memory: Cognition and memory normal.         Judgment: Judgment normal.              Vital Signs (Most Recent):  Temp: 98.5 °F (36.9 °C) (07/28/23 1101)  Pulse: 102 (07/28/23 1200)  Resp: (!) 31 (07/28/23 1200)  BP: (!) 144/78 (07/28/23 1200)  SpO2: 100 % (07/28/23 1200) Vital Signs (24h Range):  Temp:  [97.7 °F (36.5 °C)-98.6 °F (37 °C)] 98.5 °F (36.9 °C)  Pulse:  [] 102  Resp:  [19-35] 31  SpO2:  [97 %-100 %] 100 %  BP: (112-155)/(64-87) 144/78     Weight: 86.6 kg (191 lb)  Body mass index is 27.41 kg/m².      Intake/Output Summary (Last 24 hours) at 7/28/2023 1237  Last data filed at 7/28/2023 1200  Gross per 24 hour   Intake 565.11 ml   Output 3500  ml   Net -2934.89 ml       Ventilator Data:     Oxygen Concentration (%):  [28-40] 30        Hemodynamic Parameters:       Lines/Drains:       Peripheral IV - Single Lumen 07/25/23 2306 20 G Posterior;Right Hand (Active)   Site Assessment Clean;Dry;Intact;No redness;No swelling 07/28/23 1101   Extremity Assessment Distal to IV No abnormal discoloration;No redness;No swelling;No warmth 07/28/23 1101   Line Status Saline locked 07/28/23 1101   Dressing Status Clean;Dry;Intact 07/28/23 1101   Dressing Intervention Integrity maintained 07/28/23 1101   Dressing Change Due 07/29/23 07/28/23 1101   Site Change Due 07/29/23 07/28/23 0701   Reason Not Rotated Not due 07/28/23 1101   Number of days: 2            Peripheral IV - Single Lumen 07/25/23 2335 20 G Right Antecubital (Active)   Site Assessment Clean;Dry;Intact;No redness;No swelling 07/28/23 1101   Extremity Assessment Distal to IV No abnormal discoloration;No redness;No swelling;No warmth 07/28/23 1101   Line Status Infusing 07/28/23 1101   Dressing Status Clean;Dry;Intact 07/28/23 1101   Dressing Intervention Integrity maintained 07/28/23 1101   Dressing Change Due 07/29/23 07/28/23 1101   Site Change Due 07/29/23 07/28/23 0701   Reason Not Rotated Not due 07/28/23 1101   Number of days: 2            Hemodialysis AV Fistula Left forearm (Active)   Needle Size 15ga 07/28/23 0222   Site Assessment Clean;Dry;Intact;No redness;No swelling 07/28/23 1101   Patency Present;Thrill;Bruit 07/28/23 1101   Status Deaccessed 07/28/23 1101   Flows Good 07/28/23 0222   Dressing Intervention Integrity maintained 07/28/23 1101   Dressing Status Clean;Dry;Intact 07/28/23 1101   Site Condition No complications 07/28/23 1101   Dressing Gauze 07/28/23 1101   Number of days:        Significant Labs:  CBC:  Recent Labs   Lab 07/28/23  0602   WBC 8.03   RBC 3.62*   HGB 10.7*   HCT 33.8*      MCV 93   MCH 29.6   MCHC 31.7*     BMP:  Recent Labs   Lab 07/28/23  0602   *   K  3.7   CL 99   CO2 22*   BUN 26*   CREATININE 4.0*   CALCIUM 9.3      Tacrolimus Levels:  Recent Labs   Lab 07/28/23  0602   TACROLIMUS 6.1     Microbiology:  Microbiology Results (last 7 days)       Procedure Component Value Units Date/Time    Cryptococcal antigen, blood [[id number]] Collected: 07/27/23 0310    Order Status: Completed Specimen: Blood, Venous Updated: 07/27/23 0847     Cryptococcal Ag, Blood Negative    Respiratory Infection Panel (PCR), Nasopharyngeal [[id number]] Collected: 07/25/23 2131    Order Status: Completed Specimen: Nasopharyngeal Swab Updated: 07/26/23 0232     Respiratory Infection Panel Source NP Swab     Adenovirus Not Detected     Coronavirus 229E, Common Cold Virus Not Detected     Coronavirus HKU1, Common Cold Virus Not Detected     Coronavirus NL63, Common Cold Virus Not Detected     Coronavirus OC43, Common Cold Virus Not Detected     Comment: The Coronavirus strains detected in this test cause the common cold.  These strains are not the COVID-19 (novel Coronavirus)strain   associated with the respiratory disease outbreak.          SARS-CoV2 (COVID-19) Qualitative PCR Not Detected     Human Metapneumovirus Not Detected     Human Rhinovirus/Enterovirus Not Detected     Influenza B Not Detected     Parainfluenza Virus 1 Not Detected     Parainfluenza Virus 2 Not Detected     Parainfluenza Virus 3 Not Detected     Parainfluenza Virus 4 Not Detected     Respiratory Syncytial Virus Not Detected     Bordetella Parapertussis ([id number]) Not Detected     Bordetella pertussis (ptxP) Not Detected     Chlamydia pneumoniae Not Detected     Mycoplasma pneumoniae Not Detected    Narrative:      For all other respiratory sources, order [id number] -  Respiratory Viral Panel by PCR    Respiratory Infection Panel (PCR), Nasopharyngeal [[id number]] Collected: 07/25/23 2131    Order Status: Canceled Specimen: Nasopharyngeal Swab     Culture, Respiratory with Gram Stain [[id number]]     Order Status: No result  Specimen: Respiratory             I have reviewed all pertinent labs within the past 24 hours.    Diagnostic Results:  Chest X-Ray: Patchy opacities both lungs, appear minimally improved relative to 07/25/2023

## 2023-07-28 NOTE — TELEPHONE ENCOUNTER
I have signed for the following orders AND/OR meds.  Please call the patient and ask the patient to schedule the testing AND/OR inform about any medications that were sent.      Orders Placed This Encounter   Procedures    Ambulatory referral/consult to Home Health     Standing Status:   Future     Standing Expiration Date:   8/28/2024     Referral Priority:   Routine     Referral Type:   Home Health     Referral Reason:   Specialty Services Required     Referred to Provider:   [facility]     Requested Specialty:   Home Health Services     Number of Visits Requested:   1

## 2023-07-28 NOTE — PT/OT/SLP EVAL
Occupational Therapy  Evaluation and Treatment    Name: Martin Hendrix Jr.  MRN: [id number]  Admitting Diagnosis: Acute on chronic respiratory failure with hypoxia  Recent Surgery: * No surgery found *      Recommendations:     Discharge Recommendations: other (see comments)  Discharge Equipment Recommendations:     Barriers to discharge:  Decreased caregiver support    Assessment:     Martin Hendrix Jr. is a 62 y.o. male with a medical diagnosis of Acute on chronic respiratory failure with hypoxia.  He presents with performance deficits affecting function: impaired endurance, impaired cardiopulmonary response to activity, impaired self care skills, impaired functional mobility.  Pt agreeable to therapy. Pt performed bed mobility, dressing tasks and ambulation. Pt on HFNC and SpO2 remained in the mid 90s throughout session. Pt participates well and is motivated to regain functional independence in mobility and self care.      Rehab Prognosis: Good; patient would benefit from acute skilled OT services to address these deficits and reach maximum level of function.       Plan:     Patient to be seen 3 x/week to address the above listed problems via self-care/home management, therapeutic activities, therapeutic exercises  Plan of Care Expires: 08/28/23  Plan of Care Reviewed with: patient, family    Subjective     Chief Complaint: Fatigue  Patient/Family Comments/goals: Get well    Occupational Profile:  Living Environment: Pt lives alone in a house with 1 YVES and has a tub/shower combo with shower chair; pt likely to move in with his kids.  Previous level of function: Independent  Roles and Routines: Watch TV  Equipment Used at Home: shower chair (w/c for community mobility, and has RW that he was not using)  Assistance upon Discharge: Available    Pain/Comfort:  Pain Rating 1: 0/10  Pain Rating Post-Intervention 1: 0/10    Patients cultural, spiritual, Baptism conflicts given the current situation: no    Objective:      Communicated with: RN prior to session.  Patient found HOB elevated with oxygen, blood pressure cuff, telemetry, pulse ox (continuous), peripheral IV upon OT entry to room.    General Precautions: Standard, fall  Orthopedic Precautions: N/A  Braces: N/A  Respiratory Status: High flow, flow 25 L/min, concentration 30%    Occupational Performance:    Bed Mobility:    Patient completed Supine to Sit with stand by assistance    Functional Mobility/Transfers:  Patient completed Sit <> Stand Transfer with stand by assistance  with  no assistive device   Functional Mobility: SBA/CGA in room, no AD, forward/backward, distance limited by HFNC    Activities of Daily Living:  Grooming: supervision seated  Upper Body Dressing: minimum assistance gown  Lower Body Dressing: stand by assistance slip on shoes  Toileting: modified independence urinal    Cognitive/Visual Perceptual:  Cognitive/Psychosocial Skills:     -       Oriented to: Person, Place, Time, and Situation   -       Follows Commands/attention:Follows multistep  commands  -       Safety awareness/insight to disability: intact     Physical Exam:  Upper Extremity Range of Motion:     -       Right Upper Extremity: WFL  -       Left Upper Extremity: WFL  Upper Extremity Strength:    -       Right Upper Extremity: WFL  -       Left Upper Extremity: WFL   Strength:    -       Right Upper Extremity: WFL  -       Left Upper Extremity: WFL  Fine Motor Coordination:    -       Intact  Gross motor coordination:   WFL    AMPAC 6 Click ADL:  AMPAC Total Score: 20    Treatment & Education:  Pt edu re OT role, POC and safety.  Pt performed the above activities with assistance.  Pt cued for pursed-lip breathing as needed.    Patient left sitting edge of bed with all lines intact, call button in reach, and family present    GOALS:   Multidisciplinary Problems       Occupational Therapy Goals          Problem: Occupational Therapy    Goal Priority Disciplines Outcome  Interventions   Occupational Therapy Goal     OT, PT/OT Ongoing, Progressing    Description: Goals to be met by: 8/11/23    Patient will increase functional independence with ADLs by performing:    UE Dressing with Supervision.  LE Dressing with Supervision.  Grooming while standing at sink with Supervision.  Toileting from toilet with Supervision for hygiene and clothing management.   Supine to sit with [place].  Step transfer with Supervision.                         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 Peacock    DM (diabetes mellitus) 11/2017     02/22/2021 Insulin x 2017    Hypertension     On home oxygen therapy     KRISTYN on CPAP     Osteopenia     Pancreatitis 2009    hospitalized    Pulmonary hypertension     Pure hypercholesterolemia 11/15/2017    Sarcoidosis     Shortness of breath     Type 2 diabetes mellitus 11/5/2017         Past Surgical History:   Procedure Laterality Date    ABDOMINAL SURGERY      6 weeks old    AV FISTULA PLACEMENT Left 10/13/2021    Procedure: CREATION, AV FISTULA;  Surgeon: CARLI Thomason II, MD;  Location: [facility] OR [address] [place] [address];  Service: Vascular;  Laterality: Left;    BRONCHOSCOPY      BRONCHOSCOPY N/A 8/22/2018    Procedure: flexible bronchoscopy with tissue biopsy CPT 85829;  Surgeon: [facility] Diagnostic Provider;  [facility];  Service: Endoscopy;  Laterality: N/A;    BRONCHOSCOPY N/A 11/20/2018    Procedure: flexible bronchoscopy with tissue biopy CPT 81923;  Surgeon: [facility] Diagnostic Provider;  [facility];  Service: Anesthesiology;  Laterality: N/A;    BRONCHOSCOPY N/A 11/10/2021    Procedure: Flexible bronchoscopy;  Surgeon: Samantha Bill DO;  [facility];  Service:  Endoscopy;  Laterality: N/A;    CHEST TUBE INSERTION      CHOLECYSTECTOMY      COLONOSCOPY      COLONOSCOPY N/A 11/2/2022    Procedure: COLONOSCOPY;  Surgeon: Haritha Hendrix DO;  Location: [facility];  Service: General;  Laterality: N/A;    ESOPHAGOGASTRODUODENOSCOPY N/A 8/6/2018    Procedure: EGD (ESOPHAGOGASTRODUODENOSCOPY);  Surgeon: Ramu Eisenberg MD;  Location: [facility] (2ND FLR);  Service: Endoscopy;  Laterality: N/A;  off pepcid and all acid reducers for 2 weeks; PA > 50    FISTULOGRAM Left 1/10/2022    Procedure: Fistulogram;  Surgeon: CARLI Thomason II, MD;  Location: [facility] CATH LAB;  Service: Vascular;  Laterality: Left;    FISTULOGRAM, WITH PTA Left 1/20/2023    Procedure: LEFT UPPER EXTREMITY FISTULOGRAM WITH PTA  AND BALOON ANGIOPLASTY.;  Surgeon: CARLI Thomason II, MD;  Location: [facility] OR 2ND FLR;  Service: Vascular;  Laterality: Left;  mGy:47.78  Fluoro time:6.1  Contarst: 72ML  Gycm2: 11.1273    INSERTION OF TUNNELED CENTRAL VENOUS HEMODIALYSIS CATHETER N/A 3/13/2019    Procedure: INSERTION, CATHETER, CENTRAL VENOUS, HEMODIALYSIS, TUNNELED;  Surgeon: Edy Gonzalez MD;  Location: [facility] CATH LAB;  Service: Nephrology;  Laterality: N/A;    INSERTION OF TUNNELED CENTRAL VENOUS HEMODIALYSIS CATHETER Right 5/7/2021    Procedure: Insertion, Catheter, Central Venous, Hemodialysis;  Surgeon: Mary Joshi MD;  Location: [facility] CATH LAB;  Service: Interventional Nephrology;  Laterality: Right;    LUNG BIOPSY      LUNG TRANSPLANT  08/2017    PERCUTANEOUS TRANSLUMINAL ANGIOPLASTY OF ARTERIOVENOUS FISTULA N/A 1/10/2022    Procedure: PTA, AV FISTULA;  Surgeon: CARLI Thomason II, MD;  Location: [facility] CATH LAB;  Service: Vascular;  Laterality: N/A;    REMOVAL OF TUNNELED CENTRAL VENOUS CATHETER (CVC) N/A 5/16/2019    Procedure: REMOVAL, CATHETER, CENTRAL VENOUS, TUNNELED;  Surgeon: Edy Gonzalez MD;  Location: [facility] CATH LAB;  Service: Nephrology;  Laterality: N/A;    REVISION OF ARTERIOVENOUS  FISTULA Left 11/18/2021    Procedure: REVISION, AV FISTULA;  Surgeon: CARLI Thomason II, MD;  Location: [facility] OR [address] [place] [address];  Service: Vascular;  Laterality: Left;  Ligation of side branches    TONSILLECTOMY         Time Tracking:     OT Date of Treatment: 07/28/23  OT Start Time: 1000  OT Stop Time: 1022  OT Total Time (min): 22 min    Billable Minutes:Evaluation 14 minutes  Therapeutic Activity 8 minutes    EDNA Sun  7/28/2023  Pager: [phone]

## 2023-07-28 NOTE — PROGRESS NOTES
[facility] Card[facility] Medical [facility]  Nephrology  Progress Note    Patient Name: Martin Hendrix Jr.  MRN: [id number]  Admission Date: 7/25/2023  Hospital Length of Stay: 3 days  Attending Provider: Samantha Bill DO   Primary Care Physician: Michel Henley MD  Principal Problem:Acute on chronic respiratory failure with hypoxia    Subjective:     Interval History:   HD completed on yesterday 3 L removed. Oxygen requirements improved, now on comfort flow 25%/30L.   Electrolytes non emergent.     Review of patient's allergies indicates:  No Known Allergies  Current Facility-Administered Medications   Medication Frequency    0.9%  NaCl infusion Once    acetaminophen tablet 1,000 mg Q6H PRN    aspirin EC tablet 81 mg Daily    atorvastatin tablet 20 mg Daily    calcium-vitamin D3 500 mg-5 mcg (200 unit) per tablet 1 tablet BID    cinacalcet tablet 30 mg Daily with breakfast    dexmedetomidine (PRECEDEX) 400mcg/100mL 0.9% NaCL infusion Continuous    everolimus (immunosuppressive) tablet 0.75 mg QHS    everolimus (immunosuppressive) tablet 1.5 mg Daily    famotidine tablet 20 mg Daily    folic acid tablet 1,000 mcg Daily    glucagon (human recombinant) injection 1 mg PRN    glucose chewable tablet 16 g PRN    glucose chewable tablet 24 g PRN    heparin (porcine) injection 5,000 Units Q8H    insulin aspart U-100 pen 1-10 Units QID (AC + HS) PRN    insulin aspart U-100 pen 4 Units TIDWM    insulin detemir U-100 (Levemir) pen 12 Units QHS    magnesium chloride TBEC tablet TbEC 128 mg BID    meropenem (MERREM) 500 mg in sodium chloride 0.9 % 100 mL IVPB (MB+) Q24H    methylPREDNISolone sodium succinate injection 125 mg Q6H    morphine injection 2 mg Q4H PRN    ondansetron disintegrating tablet 8 mg Q8H PRN    sodium chloride 0.9% flush 10 mL PRN    sulfamethoxazole-trimethoprim 400-80mg per tablet 1 tablet Every Mon, Wed, Fri    tacrolimus capsule 1.5 mg Daily PM    tacrolimus capsule 2 mg Daily AM     vancomycin - pharmacy to dose pharmacy to manage frequency    vitamin D 1000 units tablet 2,000 Units Daily     Facility-Administered Medications Ordered in Other Encounters   Medication Frequency    0.9%  NaCl infusion Continuous    cefazolin (ANCEF) 2 gram in dextrose 5% 50 mL IVPB (premix) On Call Procedure       Objective:     Vital Signs (Most Recent):  Temp: 98.4 °F (36.9 °C) (07/28/23 0701)  Pulse: 97 (07/28/23 0900)  Resp: (!) 35 (07/28/23 0900)  BP: (!) 146/81 (07/28/23 0900)  SpO2: 100 % (07/28/23 0900) Vital Signs (24h Range):  Temp:  [97.5 °F (36.4 °C)-98.6 °F (37 °C)] 98.4 °F (36.9 °C)  Pulse:  [] 97  Resp:  [19-35] 35  SpO2:  [97 %-100 %] 100 %  BP: (112-155)/(64-87) 146/81     Weight: 86.6 kg (191 lb) (07/27/23 0300)  Body mass index is 27.41 kg/m².  Body surface area is 2.07 meters squared.    I/O last 3 completed shifts:  In: 1309.8 [P.O.:560; I.V.:205.2; IV Piggyback:544.6]  Out: 3600 [Urine:100; Other:3500]     Physical Exam  Vitals and nursing note reviewed.   Constitutional:       General: He is not in acute distress.     Appearance: Normal appearance. He is obese. He is ill-appearing.      Comments: +CF   HENT:      Head: Normocephalic and atraumatic.      Right Ear: External ear normal.      Left Ear: External ear normal.      Nose: Nose normal.      Mouth/Throat:      Mouth: Mucous membranes are moist.      Pharynx: Oropharynx is clear.   Eyes:      General: No scleral icterus.     Extraocular Movements: Extraocular movements intact.      Conjunctiva/sclera: Conjunctivae normal.   Cardiovascular:      Rate and Rhythm: Normal rate and regular rhythm.      Heart sounds: Normal heart sounds.      Arteriovenous access: Left arteriovenous access is present.  Pulmonary:      Breath sounds: Rhonchi and rales present. No wheezing.   Abdominal:      General: Abdomen is flat. Bowel sounds are normal. There is no distension.      Palpations: Abdomen is soft.      Tenderness: There is no  abdominal tenderness.   Musculoskeletal:         General: No swelling or deformity. Normal range of motion.      Cervical back: Normal range of motion.      Right lower leg: No edema.      Left lower leg: No edema.   Skin:     General: Skin is warm and dry.   Neurological:      General: No focal deficit present.      Mental Status: He is alert.   Psychiatric:         Mood and Affect: Mood normal.        Significant Labs:  CBC:   Recent Labs   Lab 07/28/23  0602   WBC 8.03   RBC 3.62*   HGB 10.7*   HCT 33.8*      MCV 93   MCH 29.6   MCHC 31.7*     CMP:   Recent Labs   Lab 07/28/23  0602   *   CALCIUM 9.3   ALBUMIN 3.3*   PROT 7.4   *   K 3.7   CO2 22*   CL 99   BUN 26*   CREATININE 4.0*   ALKPHOS 100   ALT 11   AST 15   BILITOT 0.4     All labs within the past 24 hours have been reviewed.       Assessment/Plan:     Pulmonary  * Acute on chronic respiratory failure with hypoxia  - defer to ICU and transplant service    Oxygen dependent  - dependent on oxygen 24 hrs at home, initially requiring continuous BiPAP, now on comfort flow     Lung replaced by transplant  - sp lung transplant x 2 in 2017 2/2 sarcoidosis and pulmonary HTN  - lung transplant service following     Renal/  ESRD (end stage renal disease) on dialysis  62 y.o. White Male ESRD-HD M-W-F presents to ED on 7/25/2023 with diagnosis of: Bilateral pneumonia [J18.9]   Nephrology consulted for inpatient ESRD-HD management    ESRD on IHD  TTS  -  Last iHD 7/25  [facility]/ Dr. Zenia Peacock.  Duration of outpatient dialysis session - 3.5 hrs  EDW: 87.5 kg   Access: LUE brachiocephalic fistula, created 10/13/2021 by Dr Thomason    Assessment:   - Will plan for HD tomorrow per OP schedule. No emergent need for HD today.   - Continue to monitor intake and output  - Please avoid gadolinium, fleets, phos-based laxatives, NSAIDs  - Dialysis thrice weekly unless more urgent indications arise. Will evaluate RRT  requirements Daily.    Anemia of ESRD   Recent Labs   Lab 07/26/23  0333 07/27/23  0308 07/28/23  0602   WBC 7.37 6.97 8.03   HGB 9.3* 10.1* 10.7*   HCT 30.9* 32.3* 33.8*    295 350     Lab Results   Component Value Date    FESATURATED 17 (L) 03/14/2022    FERRITIN 1,619 (H) 03/14/2022       - Goal in ESRD is Hgb of 10-11. Hgb 10.7.  - No Epogen     Mineral Bone Disease in ESRD   Lab Results   Component Value Date    .5 (H) 02/07/2022    CALCIUM 9.3 07/28/2023    CAION 1.20 07/25/2023    PHOS 3.4 07/28/2023       - F/U PO4, Mg, Calcium. And albumin levels.   - Renal diet with protein intake goal 1.5 g/kg/d with 1 L fluid restriction when appropriate  - Phos 3.4, no binders  - Continue Cinacalcet   - Daily renal panel so that phos and albumin is monitored daily.           Thank you for your consult. I will follow-up with patient. Please contact us if you have any additional questions.    Cait Finley DNP, FNP-C  Nephrology  [facility] - Card[facility]

## 2023-07-28 NOTE — NURSING
07/28/23 0522 07/28/23 0530   During Hemodialysis Assessment   Blood Flow Rate (mL/min) 400 mL/min  --    Dialysate Flow Rate (mL/min) 800 ml/min  --    Ultrafiltration Rate (mL/Hr) 1170 mL/Hr  --    Arteriovenous Lines Secure Yes  --    Arterial Pressure (mmHg) -190 mmHg  --    Venous Pressure (mmHg) 190  --    UF Removed (mL) 3500 mL  --    TMP 50  --    Venous Line in Air Detector Yes  --    Intake (mL) 250 mL  --    Transducer Dry Yes  --    Access Visible Yes  --    Intra-Hemodialysis Comments Tx complete  --    Post-Hemodialysis Assessment   Blood Volume Processed (Liters)  --  72 L   Dialyzer Clearance  --  Moderately streaked   Duration of Treatment  --  180 minutes   Total UF (mL)  --  3500 mL   Net Fluid Removal  --  3000   Patient Response to Treatment  --  Pt tolerated HD very well   Arterial bleeding stop time (min)  --  10 min   Venous bleeding stop time (min)  --  10 min   Post-Hemodialysis Comments  --  Tx complete     HD x 3 hours via JARAD AVF. UF: -3000 mL net. Pt tolerated HD very well.

## 2023-07-28 NOTE — PROGRESS NOTES
Therapy with vancomycin complete AFTER dose today at 1230.  Consult discontinued by provider. Pharmacy will sign off, please re-consult as needed.

## 2023-07-28 NOTE — PT/OT/SLP EVAL
Physical Therapy Evaluation    Patient Name:  Martin Hendrix Jr.   MRN:  [id number]  Admit Date: 7/25/2023  Admitting Diagnosis:  Acute on chronic respiratory failure with hypoxia  Length of Stay: 3 days  Recent Surgery: * No surgery found *      Recommendations:     Discharge Recommendations:  other (see comments) (defer to CM/SW)   Discharge Equipment Recommendations: none   Barriers to discharge: None    Assessment:     Martin Hendrix Jr. is a 62 y.o. male admitted with a medical diagnosis of Acute on chronic respiratory failure with hypoxia.      Problem List: impaired endurance, impaired functional mobility, gait instability, impaired balance, impaired cardiopulmonary response to activity  Rehab Prognosis: Good; patient would benefit from acute skilled PT services to address these deficits and reach maximum level of function.      Plan:     During this hospitalization, patient to be seen 3 x/week to address the identified rehab impairments via therapeutic activities, gait training, therapeutic exercises, neuromuscular re-education and progress towards the established goals.    Plan of Care Expires:  08/27/23    Subjective   Communicated with RN prior to session.  Patient found sitting edge of bed upon PT entry to room, agreeable to evaluation. Martin Hendrix Jr.'s family x 3 members present during session.    Chief Complaint: No chief complaint on file.    Patient/Family Comments/goals: to get better  Pain/Comfort:  Pain Rating 1: 0/10  Pain Rating Post-Intervention 1: 0/10    Living Environment:  Pt lives alone in a [facility] with a  entrance. Pt was mod ind with ambulation and ADLs. Pt used a WC for community distances. Patient uses DME as follows: shower chair, wheelchair. DME owned (not currently used): rolling walker.    Patient reports they will have assistance from family upon discharge.    Objective:   Patient found with: telemetry, pulse ox (continuous), blood pressure cuff, peripheral IV, oxygen     General  Precautions: Standard, Cardiac fall   Orthopedic Precautions:N/A   Braces: N/A   Oxygen Device: High Flow Nasal Cannula  Vitals: BP (!) 137/59   Pulse 105   Temp 98.5 °F (36.9 °C) (Oral)   Resp (!) 31   Wt 86.6 kg (191 lb)   SpO2 98%   BMI 27.41 kg/m²     Exams:  Cognition:   Alert and Cooperative  Ox4  Command following: Follows multistep  commands  Fluency: clear/fluent    RLE ROM: WFL  RLE Strength: WFL  LLE ROM: WFL  LLE Strength: WFL      Outcome Measures:  AM-PAC 6 CLICK MOBILITY  Turning over in bed (including adjusting bedclothes, sheets and blankets)?: 3  Sitting down on and standing up from a chair with arms (e.g., wheelchair, bedside commode, etc.): 3  Moving from lying on back to sitting on the side of the bed?: 3  Moving to and from a bed to a chair (including a wheelchair)?: 3  Need to walk in hospital room?: 3  Climbing 3-5 steps with a railing?: 3  Basic Mobility Total Score: 18     Functional Mobility:  Additional staff present: N/A  Bed Mobility:  Not performed 2nd to pt found sitting EOB    Transfers:   Sit <> Stand Transfer: stand by assistance with no assistive device from EOB      Gait:   Patient ambulated: 20ft in the room   Patient required: contact guard  Patient used: rolling walker  Gait Pattern observed: reciprocal gait  Gait Deviation(s): occasional unsteady gait and decreased bernadette  Impairments due to: decreased endurance and general deconditioning   Comments:   No LOB  Mid SOB - pt desatted to 86% during ambulation trial (10L O2). Pt recovered to 97% once seated EOB  Verbal cuing for pacing and RW management       Therapeutic Activities, Exercises, & Education:   Educated pt on PT role/POC  Educated pt on importance of OOB activity and daily ambulation   Pt verbalized understanding         Patient left sitting edge of bed with all lines intact, call button in reach, RN notified, and family x 3 members present.    GOALS:   Multidisciplinary Problems       Physical Therapy Goals           Problem: Physical Therapy    Goal Priority Disciplines Outcome Goal Variances Interventions   Physical Therapy Goal     PT, PT/OT Ongoing, Progressing     Description: Goals to be met by: 8/15/2023    Patient will increase functional independence with mobility by performin. Supine to sit with Supervision   2. Sit to stand transfer with Supervision using LRAD  3. Gait  x 50 feet with Supervision using LRAD.                          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 Peacock    DM (diabetes mellitus) 2017     2021 Insulin x     Hypertension     On home oxygen therapy     KRISTYN on CPAP     Osteopenia     Pancreatitis     hospitalized    Pulmonary hypertension     Pure hypercholesterolemia 11/15/2017    Sarcoidosis     Shortness of breath     Type 2 diabetes mellitus 2017       Past Surgical History:   Procedure Laterality Date    ABDOMINAL SURGERY      6 weeks old    AV FISTULA PLACEMENT Left 10/13/2021    Procedure: CREATION, AV FISTULA;  Surgeon: CARLI Thomason II, MD;  Location: [facility] OR [address] [place] [address];  Service: Vascular;  Laterality: Left;    BRONCHOSCOPY      BRONCHOSCOPY N/A 2018    Procedure: flexible bronchoscopy with tissue biopsy CPT 12780;  Surgeon: [facility] Diagnostic Provider;  [facility];  Service: Endoscopy;  Laterality: N/A;    BRONCHOSCOPY N/A 2018    Procedure: flexible bronchoscopy with tissue biopy CPT 40774;  Surgeon: D[facility] Diagnostic Provider;  [facility];  Service: Anesthesiology;  Laterality: N/A;    BRONCHOSCOPY N/A 11/10/2021    Procedure: Flexible bronchoscopy;  Surgeon: Samantha Bill DO;  [facility];  Service: Endoscopy;  Laterality: N/A;    CHEST  TUBE INSERTION      CHOLECYSTECTOMY      COLONOSCOPY      COLONOSCOPY N/A 11/2/2022    Procedure: COLONOSCOPY;  Surgeon: Haritha Hendrix DO;  Location: [facility] ENDO;  Service: General;  Laterality: N/A;    ESOPHAGOGASTRODUODENOSCOPY N/A 8/6/2018    Procedure: EGD (ESOPHAGOGASTRODUODENOSCOPY);  Surgeon: Ramu Eisenberg MD;  Location: [facility] (2ND FLR);  Service: Endoscopy;  Laterality: N/A;  off pepcid and all acid reducers for 2 weeks; PA > 50    FISTULOGRAM Left 1/10/2022    Procedure: Fistulogram;  Surgeon: CARLI Thomason II, MD;  Location: [facility] CATH LAB;  Service: Vascular;  Laterality: Left;    FISTULOGRAM, WITH PTA Left 1/20/2023    Procedure: LEFT UPPER EXTREMITY FISTULOGRAM WITH PTA  AND BALOON ANGIOPLASTY.;  Surgeon: CARLI Thomason II, MD;  [facility];  Service: Vascular;  Laterality: Left;  mGy:47.78  Fluoro time:6.1  Contarst: 72ML  Gycm2: 11.1273    INSERTION OF TUNNELED CENTRAL VENOUS HEMODIALYSIS CATHETER N/A 3/13/2019    Procedure: INSERTION, CATHETER, CENTRAL VENOUS, HEMODIALYSIS, TUNNELED;  Surgeon: Edy Gonzalez MD;  Location: [facility] CATH LAB;  Service: Nephrology;  Laterality: N/A;    INSERTION OF TUNNELED CENTRAL VENOUS HEMODIALYSIS CATHETER Right 5/7/2021    Procedure: Insertion, Catheter, Central Venous, Hemodialysis;  Surgeon: Mary Joshi MD;  Location: [facility] CATH LAB;  Service: Interventional Nephrology;  Laterality: Right;    LUNG BIOPSY      LUNG TRANSPLANT  08/2017    PERCUTANEOUS TRANSLUMINAL ANGIOPLASTY OF ARTERIOVENOUS FISTULA N/A 1/10/2022    Procedure: PTA, AV FISTULA;  Surgeon: CARLI Thomason II, MD;  Location: [facility] CATH LAB;  Service: Vascular;  Laterality: N/A;    REMOVAL OF TUNNELED CENTRAL VENOUS CATHETER (CVC) N/A 5/16/2019    Procedure: REMOVAL, CATHETER, CENTRAL VENOUS, TUNNELED;  Surgeon: Edy Gonzalez MD;  Location: [facility] CATH LAB;  Service: Nephrology;  Laterality: N/A;    REVISION OF ARTERIOVENOUS FISTULA Left 11/18/2021    Procedure:  REVISION, AV FISTULA;  Surgeon: CARLI Thomason II, MD;  Location: [facility] OR [address] [place] [address];  Service: Vascular;  Laterality: Left;  Ligation of side branches    TONSILLECTOMY         Time Tracking:     PT Received On: 07/28/23  PT Start Time: 1321     PT Stop Time: 1338  PT Total Time (min): 17 min     Billable Minutes: Evaluation 8 and Gait Training 8

## 2023-07-28 NOTE — PROGRESS NOTES
[facility] - Cardiac Medical ICU  Lung Transplant  Progress Note - Critical Care    Patient Name: Martin Hendrix Jr.  MRN: [id number]  Admission Date: 7/25/2023  Hospital Length of Stay: 3 days  Post-Operative Day: 2166  Attending Physician: Samantha Bill DO  Primary Care Provider: Michel Henley MD     Subjective:     Interval History: No acute events overnight.  Oxygen requirements decreasing.  Will transfer to TSU    Continuous Infusions:   dexmedeTOMIDine (Precedex) infusion (titrating) Stopped (07/27/23 0742)     Scheduled Meds:   [START ON 7/29/2023] sodium chloride 0.9%   Intravenous Once    aspirin  81 mg Oral Daily    atorvastatin  20 mg Oral Daily    calcium-vitamin D3  1 tablet Oral BID    ceFEPime (MAXIPIME) IVPB  1 g Intravenous Q24H    cinacalcet  30 mg Oral Daily with breakfast    [START ON 7/29/2023] epoetin greer (PROCRIT) injection  2,000 Units Intravenous Every Tues, Thurs, Sat    everolimus (immunosuppressive)  0.75 mg Oral QHS    everolimus (immunosuppressive)  1.5 mg Oral Daily    famotidine  20 mg Oral Daily    folic acid  1,000 mcg Oral Daily    heparin (porcine)  5,000 Units Subcutaneous Q8H    insulin aspart U-100  4 Units Subcutaneous TIDWM    insulin detemir U-100  12 Units Subcutaneous QHS    magnesium chloride  128 mg Oral BID    methylPREDNISolone sodium succinate injection  125 mg Intravenous Q6H    sulfamethoxazole-trimethoprim 400-80mg  1 tablet Oral Every Mon, Wed, Fri    tacrolimus  1.5 mg Oral Daily PM    tacrolimus  2 mg Oral Daily AM    vancomycin (VANCOCIN) IV (PEDS and ADULTS)  1,000 mg Intravenous Once    vitamin D  2,000 Units Oral Daily     PRN Meds:acetaminophen, glucagon (human recombinant), glucose, glucose, insulin aspart U-100, morphine, ondansetron, sodium chloride 0.9%    Review of patient's allergies indicates:  No Known Allergies    Review of Systems   Constitutional:  Positive for activity change and fatigue.   Respiratory:  Positive  for shortness of breath. Negative for cough, chest tightness and wheezing.    Gastrointestinal:  Negative for diarrhea, nausea and vomiting.   Genitourinary:  Positive for decreased urine volume.   Allergic/Immunologic: Positive for immunocompromised state.   Psychiatric/Behavioral:  Negative for confusion. The patient is nervous/anxious (at times).    Objective:        Physical Exam  Vitals reviewed.   Constitutional:       General: He is awake.      Appearance: He is well-developed.      Interventions: Nasal cannula in place.   HENT:      Head: Normocephalic and atraumatic.   Eyes:      Extraocular Movements: Extraocular movements intact.      Conjunctiva/sclera: Conjunctivae normal.   Cardiovascular:      Rate and Rhythm: Normal rate and regular rhythm.      Heart sounds: Normal heart sounds.   Pulmonary:      Effort: No tachypnea, accessory muscle usage or respiratory distress.      Breath sounds: Examination of the right-upper field reveals rhonchi. Examination of the left-upper field reveals rhonchi. Decreased breath sounds and rhonchi present. No wheezing.   Abdominal:      General: Bowel sounds are normal.      Palpations: Abdomen is soft.      Tenderness: There is no abdominal tenderness.   Musculoskeletal:         General: Normal range of motion.   Skin:     General: Skin is warm and dry.   Neurological:      Mental Status: He is alert and oriented to person, place, and time.   Psychiatric:         Mood and Affect: Mood normal.         Speech: Speech normal.         Behavior: Behavior is cooperative.         Thought Content: Thought content normal.         Cognition and Memory: Cognition and memory normal.         Judgment: Judgment normal.              Vital Signs (Most Recent):  Temp: 98.5 °F (36.9 °C) (07/28/23 1101)  Pulse: 102 (07/28/23 1200)  Resp: (!) 31 (07/28/23 1200)  BP: (!) 144/78 (07/28/23 1200)  SpO2: 100 % (07/28/23 1200) Vital Signs (24h Range):  Temp:  [97.7 °F (36.5 °C)-98.6 °F (37 °C)]  98.5 °F (36.9 °C)  Pulse:  [] 102  Resp:  [19-35] 31  SpO2:  [97 %-100 %] 100 %  BP: (112-155)/(64-87) 144/78     Weight: 86.6 kg (191 lb)  Body mass index is 27.41 kg/m².      Intake/Output Summary (Last 24 hours) at 7/28/2023 1237  Last data filed at 7/28/2023 1200  Gross per 24 hour   Intake 565.11 ml   Output 3500 ml   Net -2934.89 ml       Ventilator Data:     Oxygen Concentration (%):  [28-40] 30        Hemodynamic Parameters:       Lines/Drains:       Peripheral IV - Single Lumen 07/25/23 2306 20 G Posterior;Right Hand (Active)   Site Assessment Clean;Dry;Intact;No redness;No swelling 07/28/23 1101   Extremity Assessment Distal to IV No abnormal discoloration;No redness;No swelling;No warmth 07/28/23 1101   Line Status Saline locked 07/28/23 1101   Dressing Status Clean;Dry;Intact 07/28/23 1101   Dressing Intervention Integrity maintained 07/28/23 1101   Dressing Change Due 07/29/23 07/28/23 1101   Site Change Due 07/29/23 07/28/23 0701   Reason Not Rotated Not due 07/28/23 1101   Number of days: 2            Peripheral IV - Single Lumen 07/25/23 2335 20 G Right Antecubital (Active)   Site Assessment Clean;Dry;Intact;No redness;No swelling 07/28/23 1101   Extremity Assessment Distal to IV No abnormal discoloration;No redness;No swelling;No warmth 07/28/23 1101   Line Status Infusing 07/28/23 1101   Dressing Status Clean;Dry;Intact 07/28/23 1101   Dressing Intervention Integrity maintained 07/28/23 1101   Dressing Change Due 07/29/23 07/28/23 1101   Site Change Due 07/29/23 07/28/23 0701   Reason Not Rotated Not due 07/28/23 1101   Number of days: 2            Hemodialysis AV Fistula Left forearm (Active)   Needle Size 15ga 07/28/23 0222   Site Assessment Clean;Dry;Intact;No redness;No swelling 07/28/23 1101   Patency Present;Thrill;Bruit 07/28/23 1101   Status Deaccessed 07/28/23 1101   Flows Good 07/28/23 0222   Dressing Intervention Integrity maintained 07/28/23 1101   Dressing Status Clean;Dry;Intact  07/28/23 1101   Site Condition No complications 07/28/23 1101   Dressing Gauze 07/28/23 1101   Number of days:        Significant Labs:  CBC:  Recent Labs   Lab 07/28/23  0602   WBC 8.03   RBC 3.62*   HGB 10.7*   HCT 33.8*      MCV 93   MCH 29.6   MCHC 31.7*     BMP:  Recent Labs   Lab 07/28/23  0602   *   K 3.7   CL 99   CO2 22*   BUN 26*   CREATININE 4.0*   CALCIUM 9.3      Tacrolimus Levels:  Recent Labs   Lab 07/28/23  0602   TACROLIMUS 6.1     Microbiology:  Microbiology Results (last 7 days)       Procedure Component Value Units Date/Time    Cryptococcal antigen, blood [[id number]] Collected: 07/27/23 0310    Order Status: Completed Specimen: Blood, Venous Updated: 07/27/23 0847     Cryptococcal Ag, Blood Negative    Respiratory Infection Panel (PCR), Nasopharyngeal [[id number]] Collected: 07/25/23 2131    Order Status: Completed Specimen: Nasopharyngeal Swab Updated: 07/26/23 0232     Respiratory Infection Panel Source NP Swab     Adenovirus Not Detected     Coronavirus 229E, Common Cold Virus Not Detected     Coronavirus HKU1, Common Cold Virus Not Detected     Coronavirus NL63, Common Cold Virus Not Detected     Coronavirus OC43, Common Cold Virus Not Detected     Comment: The Coronavirus strains detected in this test cause the common cold.  These strains are not the COVID-19 (novel Coronavirus)strain   associated with the respiratory disease outbreak.          SARS-CoV2 (COVID-19) Qualitative PCR Not Detected     Human Metapneumovirus Not Detected     Human Rhinovirus/Enterovirus Not Detected     Influenza B Not Detected     Parainfluenza Virus 1 Not Detected     Parainfluenza Virus 2 Not Detected     Parainfluenza Virus 3 Not Detected     Parainfluenza Virus 4 Not Detected     Respiratory Syncytial Virus Not Detected     Bordetella Parapertussis (UU7809) Not Detected     Bordetella pertussis (ptxP) Not Detected     Chlamydia pneumoniae Not Detected     Mycoplasma pneumoniae Not Detected     Narrative:      For all other respiratory sources, order [id number] -  Respiratory Viral Panel by PCR    Respiratory Infection Panel (PCR), Nasopharyngeal [[id number]] Collected: 07/25/23 2131    Order Status: Canceled Specimen: Nasopharyngeal Swab     Culture, Respiratory with Gram Stain [[id number]]     Order Status: No result Specimen: Respiratory             I have reviewed all pertinent labs within the past 24 hours.    Diagnostic Results:  Chest X-Ray: Patchy opacities both lungs, appear minimally improved relative to 07/25/2023         Assessment/Plan:     Pulmonary  * Acute on chronic respiratory failure with hypoxia  Patient with Hypercapnic and Hypoxic Respiratory failure which is Acute on chronic.  he is on home oxygen at 2 LPM. Supplemental oxygen was provided and noted- Oxygen Concentration (%):  [28-40] 30    .   Signs/symptoms of respiratory failure include- tachypnea, increased work of breathing, respiratory distress and use of accessory muscles. Contributing diagnoses includes - Pleural effusion, Pneumonia and complications of lung transplant Labs and images were reviewed. Patient Has recent ABG, which has been reviewed. Will treat underlying causes and adjust management of respiratory failure as follows-     CXR with noted right sided pleural effusion.  Bedside US showed small amount of fluid noted to right side.     --continue broad spectrum antibiotics and ID consult  --Continue Bi-PAP at night, currently weaning down oxygen  --RIP negative, follow up on aspergillus antigen and fungitell, CMV PCR negative  --Nephrology following.  Removed 3L on 7/26 and 7/27    Lung replaced by transplant  Underwent BLT on 8/22/2017 for pulmonary hypertension.  Now with CLAD.  Continue OIP and immunosuppression     Complication of lung transplant  Patient with known CLAD/JONELLE.  Dyspnea has been worsening over the last several months. Patient is a DNR.    Renal/  ESRD (end stage renal disease) on dialysis  Per  Nephrology.  Patient on T,TH,Sat schedule    ID  Prophylactic antibiotic  Continue Bactrim    Immunology/Multi System  Immunosuppression  Continue tacrolimus, everolimus and resume prednisone.  Was on IV solumedrol 125 mg q6.  Will monitor tacrolimus levels while inpatient    Endocrine  Type 2 diabetes mellitus with hyperglycemia, with long-term current use of insulin  Endocrine consult.  Appreciate recs    Palliative Care  Palliative care encounter  Patient is DNR.  Palliative Care following        Preventive Measures: Nutrition: Goal: PO diet, Stress Ulcer: continue prophyllaxis, DVT: continue prophyllaxis, Head of Bet: elevated, Reposition: q2 with assistance, Physical Therapy: continue    Counseling/Consultation:I discussed the case with Dr. Bill.    Critical Care Time greater than: 30 Minutes     Irwin Celestin NP  Lung Transplant  [facility] - Cardiac Medical ICU

## 2023-07-28 NOTE — PLAN OF CARE
Problem: Occupational Therapy  Goal: Occupational Therapy Goal  Description: Goals to be met by: 8/11/23    Patient will increase functional independence with ADLs by performing:    UE Dressing with Supervision.  LE Dressing with Supervision.  Grooming while standing at sink with Supervision.  Toileting from toilet with Supervision for hygiene and clothing management.   Supine to sit with [place].  Step transfer with Supervision.    Outcome: Ongoing, Progressing     OT eval completed. The above goals are established to improve functional (I) and mobility.

## 2023-07-28 NOTE — CONSULTS
Palliative Medicine Consult.    Consult received and case discussed with primary team. Met with patient, daughter Sybil, son-in-law Sheldon, and sister Albina at bedside today along with SHANNA Keen LCSW. We discussed the patient's respiratory status and plans to try and wean down his O2. When asked about his goals, he said that he wants to get a new lung but that if he can't, he wants to be comfortable. We talked about what being comfortable means to him and he says that he wants to be able to move around and do things for himself. He would like to work with PT and get up out of bed if his respiratory status is stable enough.    The patient lives alone and family members are concerned that if his condition does not improve significantly, he will need 24 hour caregivers once he gets out of the hospital. We explained that services such as home health, home hospice, etc. do not provide around the clock care. If family is unable to stay with him or hire private sitters he may need to consider placement in a facility. Patient mentioned the possibility of going to live with his daughter and son-in-law in [place] but they both work outside of the home and daughter is pregnant with her third child. Daughter asked what would happen if his respiratory status does not improve and we are unable to wean down his O2; explained that at that point, we may need to talk about shifting to comfort focused care here in the hospital. Our team will continue to follow along as we see how his condition progresses.    Thank you for allowing us to be a part of the care of this patient.    Zenia Klein, CIELO, FNP-C  Palliative Medicine ext. [phone]

## 2023-07-28 NOTE — ASSESSMENT & PLAN NOTE
62-year-old male with sarcoidosis s/p BOLT 8/22/2017 on tacro, everolimus, CMV UL-97 resistant infection s/p course foscarnet 2019, ESRD on HD 2021, CLAD c/b chronic respiratory failure on 2L [place], presented 7/25/2023 with acute on chronic respiratory failure, critically ill on HFNC.     Work-up thus far includes negative RIP, CMV, Aspergillus Ag     Recommendations:  Follow-up fungitell  Continue vancomycin IV, cefepime IV  On TMP-SMX prophy

## 2023-07-28 NOTE — SUBJECTIVE & OBJECTIVE
Interval History: Off BiPAP, on 10L      Review of Systems   Unable to perform ROS: Acuity of condition   Objective:     Vital Signs (Most Recent):  Temp: 98.1 °F (36.7 °C) (07/28/23 1542)  Pulse: 96 (07/28/23 1542)  Resp: 18 (07/28/23 1542)  BP: 120/74 (07/28/23 1542)  SpO2: 100 % (07/28/23 1542) Vital Signs (24h Range):  Temp:  [98.1 °F (36.7 °C)-98.6 °F (37 °C)] 98.1 °F (36.7 °C)  Pulse:  [] 96  Resp:  [18-36] 18  SpO2:  [97 %-100 %] 100 %  BP: (112-155)/(59-87) 120/74     Weight: 86.6 kg (191 lb)  Body mass index is 27.41 kg/m².    CrCl cannot be calculated (Unknown ideal weight.).     Physical Exam  Constitutional:       General: He is not in acute distress.     Appearance: He is well-developed. He is ill-appearing. He is not diaphoretic.   HENT:      Head: Normocephalic and atraumatic.      Nose: Nose normal.      Comments: On high flow nasal cannula  Eyes:      Conjunctiva/sclera: Conjunctivae normal.   Pulmonary:      Effort: Pulmonary effort is normal. No respiratory distress.   Abdominal:      General: Abdomen is flat. There is no distension.   Musculoskeletal:      Cervical back: Normal range of motion.      Right lower leg: No edema.      Left lower leg: No edema.   Skin:     General: Skin is warm and dry.      Findings: No erythema or rash.   Neurological:      Mental Status: He is alert.   Psychiatric:         Behavior: Behavior normal.        Significant Labs: All pertinent labs within the past 24 hours have been reviewed.    Significant Imaging: I have reviewed all pertinent imaging results/findings within the past 24 hours.

## 2023-07-28 NOTE — PLAN OF CARE
MICU DAILY GOALS     Family/Goals of care/Code Status   Code Status: DNR    24H Vital Sign Range  Temp:  [97.4 °F (36.3 °C)-98.3 °F (36.8 °C)]   Pulse:  [72-98]   Resp:  [16-39]   BP: (121-167)/(64-97)   SpO2:  [98 %-100 %]      Shift Events   No acute events throughout shift; On high flow O2 @ 40L/40%; No Gtts; Palliative consulted; see progress note; daughter remains at bedside    AWAKE RASS: Goal - RASS Goal: 0-->alert and calm  Actual - RASS (Junior Agitation-Sedation Scale): alert and calm    Restraint necessity: Not necessary   BREATHE SBT: Not intubated    Coordinate A & B, analgesics/sedatives Pain: managed   SAT: Not intubated   Delirium CAM-ICU: Overall CAM-ICU: Negative   Early(intubated/ Progressive (non-intubated) Mobility MOVE Screen: Pass   Activity: Activity Management: Arm raise - L1, Rolling - L1   Feeding/Nutrition Diet order: Diet/Nutrition Received: consistent carb/diabetic diet,     Thrombus DVT prophylaxis: VTE Required Core Measure: Pharmacological prophylaxis initiated/maintained, Patient refused interventions   HOB Elevation Head of Bed (HOB) Positioning: HOB at 30-45 degrees   Ulcer Prophylaxis GI: yes   Glucose control managed     Skin Skin assessed during daily assessment:      Bowel Function No issues    Indwelling Catheter Necessity         No   De-escalation Antibiotics Yes       VS and assessment per flow sheet, patient progressing towards goals as tolerated, plan of care reviewed with patient/family, all concerns addressed, will continue to monitor.

## 2023-07-28 NOTE — SUBJECTIVE & OBJECTIVE
Interval History:   HD completed on yesterday 3 L removed. Oxygen requirements improved, now on comfort flow 25%/30L.   Electrolytes non emergent.     Review of patient's allergies indicates:  No Known Allergies  Current Facility-Administered Medications   Medication Frequency    0.9%  NaCl infusion Once    acetaminophen tablet 1,000 mg Q6H PRN    aspirin EC tablet 81 mg Daily    atorvastatin tablet 20 mg Daily    calcium-vitamin D3 500 mg-5 mcg (200 unit) per tablet 1 tablet BID    cinacalcet tablet 30 mg Daily with breakfast    dexmedetomidine (PRECEDEX) 400mcg/100mL 0.9% NaCL infusion Continuous    everolimus (immunosuppressive) tablet 0.75 mg QHS    everolimus (immunosuppressive) tablet 1.5 mg Daily    famotidine tablet 20 mg Daily    folic acid tablet 1,000 mcg Daily    glucagon (human recombinant) injection 1 mg PRN    glucose chewable tablet 16 g PRN    glucose chewable tablet 24 g PRN    heparin (porcine) injection 5,000 Units Q8H    insulin aspart U-100 pen 1-10 Units QID (AC + HS) PRN    insulin aspart U-100 pen 4 Units TIDWM    insulin detemir U-100 (Levemir) pen 12 Units QHS    magnesium chloride TBEC tablet TbEC 128 mg BID    meropenem (MERREM) 500 mg in sodium chloride 0.9 % 100 mL IVPB (MB+) Q24H    methylPREDNISolone sodium succinate injection 125 mg Q6H    morphine injection 2 mg Q4H PRN    ondansetron disintegrating tablet 8 mg Q8H PRN    sodium chloride 0.9% flush 10 mL PRN    sulfamethoxazole-trimethoprim 400-80mg per tablet 1 tablet Every Mon, Wed, Fri    tacrolimus capsule 1.5 mg Daily PM    tacrolimus capsule 2 mg Daily AM    vancomycin - pharmacy to dose pharmacy to manage frequency    vitamin D 1000 units tablet 2,000 Units Daily     Facility-Administered Medications Ordered in Other Encounters   Medication Frequency    0.9%  NaCl infusion Continuous    cefazolin (ANCEF) 2 gram in dextrose 5% 50 mL IVPB (premix) On Call Procedure       Objective:     Vital Signs (Most Recent):  Temp: 98.4 °F  (36.9 °C) (07/28/23 0701)  Pulse: 97 (07/28/23 0900)  Resp: (!) 35 (07/28/23 0900)  BP: (!) 146/81 (07/28/23 0900)  SpO2: 100 % (07/28/23 0900) Vital Signs (24h Range):  Temp:  [97.5 °F (36.4 °C)-98.6 °F (37 °C)] 98.4 °F (36.9 °C)  Pulse:  [] 97  Resp:  [19-35] 35  SpO2:  [97 %-100 %] 100 %  BP: (112-155)/(64-87) 146/81     Weight: 86.6 kg (191 lb) (07/27/23 0300)  Body mass index is 27.41 kg/m².  Body surface area is 2.07 meters squared.    I/O last 3 completed shifts:  In: 1309.8 [P.O.:560; I.V.:205.2; IV Piggyback:544.6]  Out: 3600 [Urine:100; Other:3500]     Physical Exam  Vitals and nursing note reviewed.   Constitutional:       General: He is not in acute distress.     Appearance: Normal appearance. He is obese. He is ill-appearing.      Comments: +CF   HENT:      Head: Normocephalic and atraumatic.      Right Ear: External ear normal.      Left Ear: External ear normal.      Nose: Nose normal.      Mouth/Throat:      Mouth: Mucous membranes are moist.      Pharynx: Oropharynx is clear.   Eyes:      General: No scleral icterus.     Extraocular Movements: Extraocular movements intact.      Conjunctiva/sclera: Conjunctivae normal.   Cardiovascular:      Rate and Rhythm: Normal rate and regular rhythm.      Heart sounds: Normal heart sounds.      Arteriovenous access: Left arteriovenous access is present.  Pulmonary:      Breath sounds: Rhonchi and rales present. No wheezing.   Abdominal:      General: Abdomen is flat. Bowel sounds are normal. There is no distension.      Palpations: Abdomen is soft.      Tenderness: There is no abdominal tenderness.   Musculoskeletal:         General: No swelling or deformity. Normal range of motion.      Cervical back: Normal range of motion.      Right lower leg: No edema.      Left lower leg: No edema.   Skin:     General: Skin is warm and dry.   Neurological:      General: No focal deficit present.      Mental Status: He is alert.   Psychiatric:         Mood and  Affect: Mood normal.        Significant Labs:  CBC:   Recent Labs   Lab 07/28/23  0602   WBC 8.03   RBC 3.62*   HGB 10.7*   HCT 33.8*      MCV 93   MCH 29.6   MCHC 31.7*     CMP:   Recent Labs   Lab 07/28/23  0602   *   CALCIUM 9.3   ALBUMIN 3.3*   PROT 7.4   *   K 3.7   CO2 22*   CL 99   BUN 26*   CREATININE 4.0*   ALKPHOS 100   ALT 11   AST 15   BILITOT 0.4     All labs within the past 24 hours have been reviewed.

## 2023-07-28 NOTE — CARE UPDATE
-Glucose Goal 140-180    -A1C:   Hemoglobin A1C   Date Value Ref Range Status   07/25/2023 5.6 4.0 - 5.6 % Final     Comment:     ADA Screening Guidelines:  5.7-6.4%  Consistent with prediabetes  >or=6.5%  Consistent with diabetes    High levels of fetal hemoglobin interfere with the HbA1C  assay. Heterozygous hemoglobin variants (HbS, HgC, etc)do  not significantly interfere with this assay.   However, presence of multiple variants may affect accuracy.           -HOME REGIMEN: Tresiba 20 unit HS & Humalog 10u AC      -GLUCOSE TREND FOR THE PAST 24HRS:   Recent Labs   Lab 07/26/23  1811 07/26/23  2115 07/27/23  0812 07/27/23  1212 07/27/23  1710 07/27/23  2150   POCTGLUCOSE 141* 250* 217* 147* 235* 153*         -NO HYPOGYCEMIAS NOTED     - Diet  Diet diabetic [facility]; 2000 Calorie    T2DM.  Hx of lung transplant w/ CLAD. BG stable on admit. On Solumedrol 125mg q6 hr.  Bg mostly at or slightly above goal.  Will continue to monitor on current regimen     - Continue Levemir 12 units AC  - Continue Novolog 4 units AC  - Continue MDC  - POCT Glucose before meals and at bedtime  - Hypoglycemia protocol in place      ** Please notify Endocrine for any change and/or advance in diet**  ** Please call Endocrine for any BG related issues **     Discharge Planning:   TBD. Please notify endocrinology prior to discharge.

## 2023-07-28 NOTE — PROGRESS NOTES
Social Work Update:    SW met with patient while on rounds with team. Patient presents awake, alert and oriented x 4 and communicative. Pt has family at bedside. Patient reports coping appropriately with ongoing medical issues. Pt continues to have good support from family. Psychosocial support provided to the patient. No discharge plans per team at this time. SW will continue to provide psychosocial support, education, resources and assistance with all discharge planning needs as appropriate. Pt aware of how to contact SW.

## 2023-07-28 NOTE — PROGRESS NOTES
Pharmacokinetic Assessment Follow Up: IV Vancomycin    Vancomycin serum concentration assessment(s):    The random level was drawn correctly and can be used to guide therapy at this time. The measurement is within the desired definitive target range of 15 to 20 mcg/mL.    Vancomycin Regimen Plan:    HD per nephrology, last overnight on 7/28.    Vancomycin 1000 mg x 1 at 1230 with plan for HD overnight for volume removal.  Re-dose when the random level is less than 20 mcg/mL, next level to be drawn at ~0500 on 7/29 with am labs.      Drug levels (last 3 results):  Recent Labs   Lab Result Units 07/26/23  0333 07/27/23  0308 07/28/23  0602   Vancomycin, Random ug/mL 21.5 15.5 15.8       Pharmacy will continue to follow and monitor vancomycin.    Please contact pharmacy at extension [phone] for questions regarding this assessment.    Thank you for the consult,   Thony Lyles       Patient brief summary:  Martin Hendrix Jr. is a 62 y.o. male initiated on antimicrobial therapy with IV Vancomycin for treatment of lower respiratory infection      Drug Allergies:   Review of patient's allergies indicates:  No Known Allergies    Actual Body Weight:   86 kg    Renal Function:   Estimated Creatinine Clearance: 20.3 mL/min (A) (based on SCr of 4 mg/dL (H)).,     Dialysis Method (if applicable):  intermittent HD    CBC (last 72 hours):  Recent Labs   Lab Result Units 07/25/23  1734 07/26/23  0333 07/27/23  0308 07/28/23  0602   WBC K/uL 7.78 7.37 6.97 8.03   Hemoglobin g/dL 10.2* 9.3* 10.1* 10.7*   Hemoglobin A1C % 5.6  --   --   --    Hematocrit % 34.3* 30.9* 32.3* 33.8*   Platelets K/uL 233 230 295 350   Gran % % 73.1* 90.4* 89.6* 93.1*   Lymph % % 8.5* 6.0* 7.5* 4.5*   Mono % % 14.1 2.0* 2.2* 1.7*   Eosinophil % % 2.2 0.4 0.0 0.0   Basophil % % 0.8 0.5 0.1 0.1   Differential Method  Automated Automated Automated Automated       Metabolic Panel (last 72 hours):  Recent Labs   Lab Result Units 07/25/23  1734 07/26/23  0332  07/26/23  0427 07/27/23  0308 07/28/23  0602   Sodium mmol/L 139 136 136 134* 134*   Potassium mmol/L 4.6 5.8* 5.3* 4.9 3.7   Chloride mmol/L 100 101 99 100 99   CO2 mmol/L 28 22* 25 21* 22*   Glucose mg/dL 127* 158* 176* 204* 158*   BUN mg/dL 18 21* 21* 27* 26*   Creatinine mg/dL 5.6* 6.0* 6.8* 4.8* 4.0*   Albumin g/dL 3.5 3.1* 3.3* 3.3* 3.3*   Total Bilirubin mg/dL 0.3 0.3 0.4 0.3 0.4   Alkaline Phosphatase U/L 102 87 105 95 100   AST U/L 18 21 15 19 15   ALT U/L 13 14 12 13 11   Magnesium mg/dL  --  2.2  --  2.5 2.4   Phosphorus mg/dL 5.9* 5.2*  --  5.2* 3.4       Vancomycin Administrations:  vancomycin given in the last 96 hours                     vancomycin (VANCOCIN) 500 mg in dextrose 5 % in water (D5W) 100 mL IVPB (MB+) (mg) 500 mg New Bag 07/27/23 1406                    Microbiologic Results:  Microbiology Results (last 7 days)       Procedure Component Value Units Date/Time    Cryptococcal antigen, blood [[id number]] Collected: 07/27/23 0310    Order Status: Completed Specimen: Blood, Venous Updated: 07/27/23 0847     Cryptococcal Ag, Blood Negative    Respiratory Infection Panel (PCR), Nasopharyngeal [[id number]] Collected: 07/25/23 2131    Order Status: Completed Specimen: Nasopharyngeal Swab Updated: 07/26/23 0232     Respiratory Infection Panel Source NP Swab     Adenovirus Not Detected     Coronavirus 229E, Common Cold Virus Not Detected     Coronavirus HKU1, Common Cold Virus Not Detected     Coronavirus NL63, Common Cold Virus Not Detected     Coronavirus OC43, Common Cold Virus Not Detected     Comment: The Coronavirus strains detected in this test cause the common cold.  These strains are not the COVID-19 (novel Coronavirus)strain   associated with the respiratory disease outbreak.          SARS-CoV2 (COVID-19) Qualitative PCR Not Detected     Human Metapneumovirus Not Detected     Human Rhinovirus/Enterovirus Not Detected     Influenza B Not Detected     Parainfluenza Virus 1 Not Detected      Parainfluenza Virus 2 Not Detected     Parainfluenza Virus 3 Not Detected     Parainfluenza Virus 4 Not Detected     Respiratory Syncytial Virus Not Detected     Bordetella Parapertussis (JE3922) Not Detected     Bordetella pertussis (ptxP) Not Detected     Chlamydia pneumoniae Not Detected     Mycoplasma pneumoniae Not Detected    Narrative:      For all other respiratory sources, order [id number] -  Respiratory Viral Panel by PCR    Respiratory Infection Panel (PCR), Nasopharyngeal [[id number]] Collected: 07/25/23 2131    Order Status: Canceled Specimen: Nasopharyngeal Swab     Culture, Respiratory with Gram Stain [[id number]]     Order Status: No result Specimen: Respiratory

## 2023-07-28 NOTE — PLAN OF CARE
Problem: Physical Therapy  Goal: Physical Therapy Goal  Description: Goals to be met by: 8/15/2023    Patient will increase functional independence with mobility by performin. Supine to sit with Supervision   2. Sit to stand transfer with Supervision using LRAD  3. Gait  x 50 feet with Supervision using LRAD.     Outcome: Ongoing, Progressing     Eval completed. Goals appropriate.

## 2023-07-28 NOTE — NURSING TRANSFER
Nursing Transfer Note      7/28/2023   2:48 PM        Reason patient is being transferred: step down    Transfer To: [id number]    Transfer via bed    Transfer with 4 to O2, cardiac monitoring    Transported by RN and PCT        Telemetry: Box Number [id number]  Order for Tele Monitor? Yes    Additional Lines: Oxygen    4eyes on Skin: yes    Medicines sent: insulin pens    Any special needs or follow-up needed: none    Patient belongings transferred with patient: Yes    Chart send with patient: Yes    Notified: daughter    Upon arrival to floor: patient oriented to room, call bell in reach, and bed in lowest position

## 2023-07-28 NOTE — ASSESSMENT & PLAN NOTE
Continue tacrolimus, everolimus and resume prednisone.  Was on IV solumedrol 125 mg q6.  Will monitor tacrolimus levels while inpatient

## 2023-07-28 NOTE — ASSESSMENT & PLAN NOTE
62 y.o. White Male ESRD-HD M-W-F presents to ED on 7/25/2023 with diagnosis of: Bilateral pneumonia [J18.9]   Nephrology consulted for inpatient ESRD-HD management    ESRD on IHD  TTS  -  Last iHD 7/25  [facility]/ Dr. Zenia Peacock.  Duration of outpatient dialysis session - 3.5 hrs  EDW: 87.5 kg   Access: LUE brachiocephalic fistula, created 10/13/2021 by Dr Thomason    Assessment:   - Will plan for HD tomorrow per OP schedule. No emergent need for HD today.   - Continue to monitor intake and output  - Please avoid gadolinium, fleets, phos-based laxatives, NSAIDs  - Dialysis thrice weekly unless more urgent indications arise. Will evaluate RRT requirements Daily.    Anemia of ESRD   Recent Labs   Lab 07/26/23  0333 07/27/23  0308 07/28/23  0602   WBC 7.37 6.97 8.03   HGB 9.3* 10.1* 10.7*   HCT 30.9* 32.3* 33.8*    295 350     Lab Results   Component Value Date    FESATURATED 17 (L) 03/14/2022    FERRITIN 1,619 (H) 03/14/2022       - Goal in ESRD is Hgb of 10-11. Hgb 10.7.  - No Epogen     Mineral Bone Disease in ESRD   Lab Results   Component Value Date    .5 (H) 02/07/2022    CALCIUM 9.3 07/28/2023    CAION 1.20 07/25/2023    PHOS 3.4 07/28/2023       - F/U PO4, Mg, Calcium. And albumin levels.   - Renal diet with protein intake goal 1.5 g/kg/d with 1 L fluid restriction when appropriate  - Phos 3.4, no binders  - Continue Cinacalcet   - Daily renal panel so that phos and albumin is monitored daily.

## 2023-07-28 NOTE — ASSESSMENT & PLAN NOTE
Patient with Hypercapnic and Hypoxic Respiratory failure which is Acute on chronic.  he is on home oxygen at 2 LPM. Supplemental oxygen was provided and noted- Oxygen Concentration (%):  [28-40] 30    .   Signs/symptoms of respiratory failure include- tachypnea, increased work of breathing, respiratory distress and use of accessory muscles. Contributing diagnoses includes - Pleural effusion, Pneumonia and complications of lung transplant Labs and images were reviewed. Patient Has recent ABG, which has been reviewed. Will treat underlying causes and adjust management of respiratory failure as follows-     CXR with noted right sided pleural effusion.  Bedside US showed small amount of fluid noted to right side.     --continue broad spectrum antibiotics and ID consult  --Continue Bi-PAP at night, currently weaning down oxygen  --RIP negative, follow up on aspergillus antigen and fungitell, CMV PCR negative  --Nephrology following.  Removed 3L on 7/26 and 7/27

## 2023-07-28 NOTE — PROGRESS NOTES
[facility] - Transplant Stepdown  Infectious Disease  Progress Note    Patient Name: Martin Hendrix Jr.  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     Work-up thus far includes negative RIP, CMV, Aspergillus Ag.    Per lung transplant team, patient with poor prognosis given CLAD - limited options available and it is unlikely additional evaluation for infectious etiologies including fungal etiologies will alter long term trajectory of his respiratory failure.     Recommendations:  Follow-up fungitell  Continue vancomycin IV, cefepime IV, plan for 14 day course, last day 8/7/2023, okay to give after HD on HD days  On TMP-SMX prophy        Critical care time: 35 minutes   I personally spent critical care time on the following: evaluating this patient's organ dysfunction, development of treatment plan, discussing treatment plan with patient or surrogate and bedside caregivers, discussions with critical care service and/or consultants, evaluation of patient's response to treatment, physical examination of patient, ordering and review of treatments interventions, laboratory studies, and radiographic studies, re-evaluation of patient's condition. This critical care time did not overlap with that of any other provider of the same specialty or involve time for procedures.       Thank you for your consult. I will sign off. Please contact us if you have any additional questions.    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 He has felt short of breath for the last week and said he has been going to his dialysis as scheduled. He denied fever, chills, cough, N/V/D. He has been compliant with his anti-rejection meds. Found to have a multifocal pneumonia on CXR; Covid and Trop negative. While awaiting transfer to [facility] for transplant care- his respiratory status worsened requiring continuous bipap. Arrived at [facility] MICU on 7/25. Further evaluation revealed that patient's condition is most likely due to graft rejection. ID consulted for "Status lung transplant on Immunosuppressive therapy, ? Pneumonia"    Interval History: Off BiPAP, on 10L      Review of Systems   Unable to perform ROS: Acuity of condition   Objective:     Vital Signs (Most Recent):  Temp: 98.1 °F (36.7 °C) (07/28/23 1542)  Pulse: 96 (07/28/23 1542)  Resp: 18 (07/28/23 1542)  BP: 120/74 (07/28/23 1542)  SpO2: 100 % (07/28/23 1542) Vital Signs (24h Range):  Temp:  [98.1 °F (36.7 °C)-98.6 °F (37 °C)] 98.1 °F (36.7 °C)  Pulse:  [] 96  Resp:  [18-36] 18  SpO2:  [97 %-100 %] 100 %  BP: (112-155)/(59-87) 120/74     Weight: 86.6 kg (191 lb)  Body mass index is 27.41 kg/m².    CrCl cannot be calculated (Unknown ideal weight.).     Physical Exam  Constitutional:       General: He is not in acute distress.     Appearance: He is well-developed. He is ill-appearing. He is not diaphoretic.   HENT:      Head: Normocephalic and atraumatic.      Nose: Nose normal.      Comments: On high flow nasal cannula  Eyes:      Conjunctiva/sclera: Conjunctivae normal.   Pulmonary:      Effort: Pulmonary effort is normal. No respiratory distress.   Abdominal:      General: Abdomen is flat. There " is no distension.   Musculoskeletal:      Cervical back: Normal range of motion.      Right lower leg: No edema.      Left lower leg: No edema.   Skin:     General: Skin is warm and dry.      Findings: No erythema or rash.   Neurological:      Mental Status: He is alert.   Psychiatric:         Behavior: Behavior normal.        Significant Labs: All pertinent labs within the past 24 hours have been reviewed.    Significant Imaging: I have reviewed all pertinent imaging results/findings within the past 24 hours.     [Fever] : no fever [Chills] : no chills [Fatigue] : no fatigue [Hot Flashes] : no hot flashes [Night Sweats] : no night sweats [Recent Change In Weight] : ~T no recent weight change [Pain] : no pain [Vision Problems] : no vision problems [Chest Pain] : no chest pain [Palpitations] : no palpitations [Claudication] : no  leg claudication [Shortness Of Breath] : no shortness of breath [Wheezing] : no wheezing [Cough] : no cough [Dyspnea on Exertion] : not dyspnea on exertion [Abdominal Pain] : no abdominal pain [Nausea] : no nausea [Constipation] : no constipation [Diarrhea] : no diarrhea [Vomiting] : no vomiting [Headache] : no headache [Dizziness] : no dizziness [Depression] : no depression

## 2023-07-28 NOTE — SUBJECTIVE & OBJECTIVE
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 Peacock    DM (diabetes mellitus) 11/2017     02/22/2021 Insulin x 2017    Hypertension     On home oxygen therapy     KRISTYN on CPAP     Osteopenia     Pancreatitis 2009    hospitalized    Pulmonary hypertension     Pure hypercholesterolemia 11/15/2017    Sarcoidosis     Shortness of breath     Type 2 diabetes mellitus 11/5/2017       Past Surgical History:   Procedure Laterality Date    ABDOMINAL SURGERY      6 weeks old    AV FISTULA PLACEMENT Left 10/13/2021    Procedure: CREATION, AV FISTULA;  Surgeon: CARLI Thomason II, MD;  [facility];  Service: Vascular;  Laterality: Left;    BRONCHOSCOPY      BRONCHOSCOPY N/A 8/22/2018    Procedure: flexible bronchoscopy with tissue biopsy CPT 03010;  Surgeon: [facility] Diagnostic Provider;  [facility];  Service: Endoscopy;  Laterality: N/A;    BRONCHOSCOPY N/A 11/20/2018    Procedure: flexible bronchoscopy with tissue biopy CPT 03696;  Surgeon: [facility] Diagnostic Provider;  [facility];  Service: Anesthesiology;  Laterality: N/A;    BRONCHOSCOPY N/A 11/10/2021    Procedure: Flexible bronchoscopy;  Surgeon: Samantha Bill DO;  [facility];  Service: Endoscopy;  Laterality: N/A;    CHEST TUBE INSERTION      CHOLECYSTECTOMY      COLONOSCOPY      COLONOSCOPY N/A 11/2/2022    Procedure: COLONOSCOPY;  Surgeon: Haritha Hendrix DO;  Location: [facility];  Service: General;  Laterality: N/A;    ESOPHAGOGASTRODUODENOSCOPY N/A 8/6/2018    Procedure: EGD (ESOPHAGOGASTRODUODENOSCOPY);  Surgeon: Ramu Eisenberg MD;  Location: [facility] (2ND FLR);  Service: Endoscopy;  Laterality: N/A;  off pepcid and all acid reducers for 2  weeks; PA > 50    FISTULOGRAM Left 1/10/2022    Procedure: Fistulogram;  Surgeon: CARLI Thomason II, MD;  Location: [facility] CATH LAB;  Service: Vascular;  Laterality: Left;    FISTULOGRAM, WITH PTA Left 1/20/2023    Procedure: LEFT UPPER EXTREMITY FISTULOGRAM WITH PTA  AND BALOON ANGIOPLASTY.;  Surgeon: CARLI Thomason II, MD;  Location: [facility] OR [facility] FLR;  Service: Vascular;  Laterality: Left;  mGy:47.78  Fluoro time:6.1  Contarst: 72ML  Gycm2: 11.1273    INSERTION OF TUNNELED CENTRAL VENOUS HEMODIALYSIS CATHETER N/A 3/13/2019    Procedure: INSERTION, CATHETER, CENTRAL VENOUS, HEMODIALYSIS, TUNNELED;  Surgeon: Edy Gonzalez MD;  Location: [facility] CATH LAB;  Service: Nephrology;  Laterality: N/A;    INSERTION OF TUNNELED CENTRAL VENOUS HEMODIALYSIS CATHETER Right 5/7/2021    Procedure: Insertion, Catheter, Central Venous, Hemodialysis;  Surgeon: Mary Joshi MD;  Location: [facility] CATH LAB;  Service: Interventional Nephrology;  Laterality: Right;    LUNG BIOPSY      LUNG TRANSPLANT  08/2017    PERCUTANEOUS TRANSLUMINAL ANGIOPLASTY OF ARTERIOVENOUS FISTULA N/A 1/10/2022    Procedure: PTA, AV FISTULA;  Surgeon: CARLI Thomason II, MD;  Location: [facility] CATH LAB;  Service: Vascular;  Laterality: N/A;    REMOVAL OF TUNNELED CENTRAL VENOUS CATHETER (CVC) N/A 5/16/2019    Procedure: REMOVAL, CATHETER, CENTRAL VENOUS, TUNNELED;  Surgeon: Edy Gonzalez MD;  Location: [facility] CATH LAB;  Service: Nephrology;  Laterality: N/A;    REVISION OF ARTERIOVENOUS FISTULA Left 11/18/2021    Procedure: REVISION, AV FISTULA;  Surgeon: CARLI Thomason II, MD;  [facility];  Service: Vascular;  Laterality: Left;  Ligation of side branches    TONSILLECTOMY         Review of patient's allergies indicates:  No Known Allergies    Medications:  Continuous Infusions:   dexmedeTOMIDine (Precedex) infusion (titrating) Stopped (07/27/23 0742)     Scheduled Meds:   sodium chloride 0.9%   Intravenous Once    aspirin  81 mg Oral  Daily    atorvastatin  20 mg Oral Daily    calcium-vitamin D3  1 tablet Oral BID    cinacalcet  30 mg Oral Daily with breakfast    everolimus (immunosuppressive)  0.75 mg Oral QHS    everolimus (immunosuppressive)  1.5 mg Oral Daily    famotidine  20 mg Oral Daily    folic acid  1,000 mcg Oral Daily    heparin (porcine)  5,000 Units Subcutaneous Q8H    insulin aspart U-100  4 Units Subcutaneous TIDWM    insulin detemir U-100  12 Units Subcutaneous QHS    magnesium chloride  128 mg Oral BID    meropenem (MERREM) IVPB  500 mg Intravenous Q24H    methylPREDNISolone sodium succinate injection  125 mg Intravenous Q6H    sulfamethoxazole-trimethoprim 400-80mg  1 tablet Oral Every Mon, Wed, Fri    tacrolimus  1.5 mg Oral Daily PM    tacrolimus  2 mg Oral Daily AM    vitamin D  2,000 Units Oral Daily     PRN Meds:acetaminophen, glucagon (human recombinant), glucose, glucose, insulin aspart U-100, morphine, ondansetron, sodium chloride 0.9%, Pharmacy to dose Vancomycin consult **AND** vancomycin - pharmacy to dose    Family History       Problem Relation (Age of Onset)    Blindness Father    Diabetes Father, Brother    Hypertension Mother    Kidney disease Sister          Tobacco Use    Smoking status: Never    Smokeless tobacco: Never   Substance and Sexual Activity    Alcohol use: No    Drug use: No    Sexual activity: Not on file       Review of Systems  Objective:     Vital Signs (Most Recent):  Temp: 98.6 °F (37 °C) (07/27/23 2305)  Pulse: 94 (07/28/23 0003)  Resp: (!) 34 (07/28/23 0001)  BP: (!) 143/77 (07/28/23 0001)  SpO2: 99 % (07/28/23 0001) Vital Signs (24h Range):  Temp:  [97.4 °F (36.3 °C)-98.6 °F (37 °C)] 98.6 °F (37 °C)  Pulse:  [] 94  Resp:  [16-34] 34  SpO2:  [98 %-100 %] 99 %  BP: (130-167)/(64-97) 143/77     Weight: 86.6 kg (191 lb)  Body mass index is 27.41 kg/m².       Physical Exam       Review of Symptoms      Symptom Assessment (ESAS 0-10 Scale)  Pain:  0  Dyspnea:  0  Anxiety:  0  Nausea:   0  Depression:  0  Anorexia:  0  Fatigue:  0  Insomnia:  0  Restlessness:  0  Agitation:  0         Psychosocial/Cultural:   See Palliative Psychosocial Note: Yes  **Primary  to Follow**  Palliative Care  Consult: Yes      Advance Care Planning   Advance Directives:   Living Will: No    LaPOST: No    Do Not Resuscitate Status: Yes    Medical Power of : No      Decision Making:  Patient answered questions and Family answered questions  Goals of Care: The patient endorses that what is most important right now is to focus on remaining as independent as possible and improvement in condition but with limits to invasive therapies    Accordingly, we have decided that the best plan to meet the patient's goals includes continuing with treatment         Significant Labs: {Results:51794}  CBC:   Recent Labs   Lab 07/27/23 0308   WBC 6.97   HGB 10.1*   HCT 32.3*   MCV 96        BMP:  Recent Labs   Lab 07/27/23 0308   *   *   K 4.9      CO2 21*   BUN 27*   CREATININE 4.8*   CALCIUM 9.5   MG 2.5     LFT:  Lab Results   Component Value Date    AST 19 07/27/2023    ALKPHOS 95 07/27/2023    BILITOT 0.3 07/27/2023     Albumin:   Albumin   Date Value Ref Range Status   07/27/2023 3.3 (L) 3.5 - 5.2 g/dL Final     Protein:   Total Protein   Date Value Ref Range Status   07/27/2023 7.8 6.0 - 8.4 g/dL Final     Comment:     *Result may be interfered by visible hemolysis     Lactic acid:   Lab Results   Component Value Date    LACTATE 0.7 07/25/2023    LACTATE 1.0 02/13/2022       Significant Imaging: {Imaging Review:46239}

## 2023-07-29 NOTE — PLAN OF CARE
Pt aao x4. Call bell within reach. He is up with 1 person assist. Able to turn self independently. Daughter and son in law at bedside today. He is receiving HD today. On 4L of O2 with sats greater than 98%. Endocrine following for blood sugar control. Continuing iv cefepime. He verbalized understanding of plan of care.

## 2023-07-29 NOTE — CARE UPDATE
-Glucose Goal 140-180    -A1C:   Hemoglobin A1C   Date Value Ref Range Status   07/25/2023 5.6 4.0 - 5.6 % Final     Comment:     ADA Screening Guidelines:  5.7-6.4%  Consistent with prediabetes  >or=6.5%  Consistent with diabetes    High levels of fetal hemoglobin interfere with the HbA1C  assay. Heterozygous hemoglobin variants (HbS, HgC, etc)do  not significantly interfere with this assay.   However, presence of multiple variants may affect accuracy.           -HOME REGIMEN: Tresiba 20 unit HS & Humalog 10u AC      -GLUCOSE TREND FOR THE PAST 24HRS:   Recent Labs   Lab 07/27/23  2150 07/28/23  0821 07/28/23  1149 07/28/23  1735 07/28/23  2109 07/29/23  0817   POCTGLUCOSE 153* 191* 198* 270* 245* 152*           -NO HYPOGYCEMIAS NOTED     - Diet  Diet diabetic [facility]; 2000 Calorie    T2DM.  Hx of lung transplant w/ CLAD. BG stable on admit. Solumedrol 125mg q6 hr stopped and now on pred 5.  Bg mostly at or slightly above goal.  Will continue to monitor on current regimen     - Continue Levemir 12 units AC  - Continue Novolog 4 units AC  - Continue MDC  - POCT Glucose before meals and at bedtime  - Hypoglycemia protocol in place      ** Please notify Endocrine for any change and/or advance in diet**  ** Please call Endocrine for any BG related issues **     Discharge Planning:   TBD. Please notify endocrinology prior to discharge.

## 2023-07-29 NOTE — PLAN OF CARE
Problem: Adult Inpatient Plan of Care  Goal: Plan of Care Review  Outcome: Ongoing, Progressing  Flowsheets (Taken 7/29/2023 0509)  Plan of Care Reviewed With: patient     Problem: Diabetes Comorbidity  Goal: Blood Glucose Level Within Targeted Range  Outcome: Ongoing, Progressing  Intervention: Monitor and Manage Glycemia  Flowsheets (Taken 7/29/2023 0509)  Glycemic Management: blood glucose monitored     Problem: Skin Injury Risk Increased  Goal: Skin Health and Integrity  Outcome: Ongoing, Progressing  Intervention: Optimize Skin Protection  Flowsheets (Taken 7/29/2023 0509)  Pressure Reduction Devices: positioning supports utilized  Head of Bed (HOB) Positioning: HOB at 30-45 degrees

## 2023-07-29 NOTE — PROGRESS NOTES
3.5 hour dialysis complete.  Blood returned.  [place] pulled from LFA fistula.  Pressure held x 5 minutes. Hemostasis achieved.  Covered with gauze and paper tape.  +thrill +bruit.    Net UF 1.5L. Unable to get 2L as ordered d/t soft BP's.    Transported from [facility] to room 30478 via w/c by this RN.      Primary RN, Myah, at the bedside.

## 2023-07-29 NOTE — PROGRESS NOTES
[facility] HEMODIALYSIS NOTE     Patient currently on hemodialysis for removal of uremic toxins and volume.     Patient seen and evaluated on hemodialysis, tolerating treatment, see HD flowsheet for vitals and assessments.    Labs have been reviewed and the dialysate bath has been adjusted.       Assessment/Plan:    -Establish new EDW   -Patient seen on HD, tolerating treatment well, w/o complaints   -UF goal of 3L  -Renal diet, if not NPO   -Strict I/O's and daily weights  -Daily renal function panels  -Keep MAP >65 while on HD   -Hgb goal >11  -continue epo   -Will continue to follow while inpatient     Zenia Singh DNP-FNP, C  Nephrology  Pager: [phone]

## 2023-07-30 NOTE — NURSING
BP  70/40's both on the monitor and manually. MD aware. Ordered a 250cc bolus. Aware that the patient just returned from dialysis where 1.5L was removed. Pt currently on 4L nasal canula satting 100%. He is tachypneic RR 24-26. Pt states he has dyspnea with exertion. MD aware. Bolus to be administered. Pt placed on bedside monitor cycling BP q 5 min

## 2023-07-30 NOTE — PLAN OF CARE
Plan of care reviewed with the patient at the beginning of the shift. Pt admitted with acute on chronic respiratory failure. He is on 4L nasal canula. Tachypnea and dyspnea with exertion. He is on a bipap at night. Pt returned from dialysis yesterday at shift change and BP was low. He had 1.5L removed with dialysis. Pt required a 250cc LR bolus. Blood glucose monitoring ac and hs. Fall precautions maintained. He is up with one person assist. Pt remained free from falls and injury this shift. Bed locked in lowest position, side rails up x2, call light within reach. Instructed pt to call for assistance as needed. Pt verbalized understanding. Vitals stable. Pt afebrile overnight. No acute issues overnight. Will continue to monitor.

## 2023-07-30 NOTE — PROGRESS NOTES
AAOx4. VSS - BP soft (90s-110s/50s-60s). DNR status maintained - palliative care following. Patient up w/ standby assist OOB to bathroom. Patient had BM x1 this shift. Cr 5.2 (down from 5.9 prior) on AM labs. Patient is oliguric - HD on T, Th, Sat schedule - last HD yest 7/29 - 1.5L removed. Patient on 3L NC w/ O2 sats >96% throughout shift. Patient becomes tachypnic and SOB w/ exertion. Patient wears bipap at night. Cefepime IVPB continued Q24hrs. Non-skid socks on. Bed in low position. Call light within reach.

## 2023-07-30 NOTE — CARE UPDATE
-Glucose Goal 140-180    -A1C:   Hemoglobin A1C   Date Value Ref Range Status   07/25/2023 5.6 4.0 - 5.6 % Final     Comment:     ADA Screening Guidelines:  5.7-6.4%  Consistent with prediabetes  >or=6.5%  Consistent with diabetes    High levels of fetal hemoglobin interfere with the HbA1C  assay. Heterozygous hemoglobin variants (HbS, HgC, etc)do  not significantly interfere with this assay.   However, presence of multiple variants may affect accuracy.           -HOME REGIMEN: Tresiba 20 unit HS & Humalog 10u AC      -GLUCOSE TREND FOR THE PAST 24HRS:   Recent Labs   Lab 07/28/23  1735 07/28/23  2109 07/29/23  0817 07/29/23  1215 07/29/23  1727 07/29/23 2031   POCTGLUCOSE 270* 245* 152* 161* 137* 259*           -NO HYPOGYCEMIAS NOTED     - Diet  Diet diabetic [facility]; 2000 Calorie    T2DM.  Hx of lung transplant w/ CLAD. BG stable on admit. Prednisone 5 mg with BG excursions.      - Decrease Levemir to 8 units nightly (20% reduction due to fasting blood glucose below goal.)  - Continue Novolog 4 units AC  - Continue MDC  - POCT Glucose before meals and at bedtime  - Hypoglycemia protocol in place      ** Please notify Endocrine for any change and/or advance in diet**  ** Please call Endocrine for any BG related issues **     Discharge Planning:   TBD. Please notify endocrinology prior to discharge.

## 2023-07-30 NOTE — PROGRESS NOTES
[facility] - Transplant Stepdown  Critical Care - Medicine  Progress Note    Patient Name: Martin Hendrix Jr.  MRN: [id number]  Admission Date: 7/25/2023  Hospital Length of Stay: 5 days  Code Status: DNR  Attending Provider: Kenna Paz MD  Primary Care Provider: Michel Henley MD   Principal Problem: Acute on chronic respiratory failure with hypoxia    Subjective:     Interval History/Significant Events: No acute events, tolerate HD, but had to rinse back a little volume due to hypotension, wants to work with PT    Review of Systems  Objective:     Vital Signs (Most Recent):  Temp: 97.7 °F (36.5 °C) (07/30/23 1218)  Pulse: 93 (07/30/23 1218)  Resp: 18 (07/30/23 1218)  BP: 109/60 (07/30/23 1218)  SpO2: (!) 93 % (07/30/23 1218) Vital Signs (24h Range):  Temp:  [97.7 °F (36.5 °C)-98.5 °F (36.9 °C)] 97.7 °F (36.5 °C)  Pulse:  [] 93  Resp:  [18-24] 18  SpO2:  [93 %-100 %] 93 %  BP: ()/(46-74) 109/60     Weight: 82.9 kg (182 lb 12.2 oz)  Body mass index is 25.49 kg/m².      Intake/Output Summary (Last 24 hours) at 7/30/2023 1424  Last data filed at 7/30/2023 1227  Gross per 24 hour   Intake 1940.02 ml   Output 2150 ml   Net -209.98 ml       Physical Exam    Vents:  Oxygen Concentration (%): 30 (07/29/23 0053)    Lines/Drains/Airways       Peripheral Intravenous Line  Duration                  Hemodialysis AV Fistula Left forearm -- days         Peripheral IV - Single Lumen 07/29/23 2000 20 G Right;Distal Forearm <1 day                    Significant Labs:    CBC/Anemia Profile:  Recent Labs   Lab 07/29/23  0335 07/30/23  0647   WBC 7.45 9.12   HGB 12.0* 11.6*   HCT 37.7* 38.8*    325   MCV 93 98   RDW 12.5 12.4        Chemistries:  Recent Labs   Lab 07/29/23  0335 07/30/23  0647   * 134*   K 4.0 4.3    102   CO2 21* 20*   BUN 68* 57*   CREATININE 5.9* 5.2*   CALCIUM 8.8 8.9   ALBUMIN 3.1*  3.0* 3.2*  3.2*   PROT 6.7  6.6 7.0  7.0   BILITOT 0.4  0.4 0.5  0.5   ALKPHOS 89  90 95  95    ALT 12  9* 12  12   AST 13  13 21  21   MG 2.8* 2.6   PHOS 5.5* 4.1           Significant Imaging:      Assessment/Plan:     Active Diagnoses:    Diagnosis Date Noted POA    PRINCIPAL PROBLEM:  Acute on chronic respiratory failure with hypoxia [J96.21] 07/25/2023 Yes    Goals of care, counseling/discussion [Z71.89] 07/27/2023 Not Applicable    Palliative care encounter [Z51.5] 07/26/2023 Not Applicable    Lung transplanted [Z94.2] 07/26/2023 Not Applicable    ESRD (end stage renal disease) [N18.6] 07/26/2023 Yes    Oxygen dependent [Z99.81] 09/23/2021 Not Applicable    ESRD (end stage renal disease) on dialysis [N18.6, Z99.2] 09/14/2021 Not Applicable    Type 2 diabetes mellitus with hyperglycemia, with long-term current use of insulin [E11.65, Z79.4] 11/06/2017 Not Applicable     Chronic    Complication of lung transplant [T86.819] 08/24/2017 Yes    Immunosuppression [D84.9] 08/22/2017 Yes    Lung replaced by transplant [Z94.2] 08/22/2017 Not Applicable    Prophylactic antibiotic [Z79.2] 08/22/2017 Not Applicable      Problems Resolved During this Admission:       Pulmonary  * Acute on chronic respiratory failure with hypoxia  Patient with Hypercapnic and Hypoxic Respiratory failure which is Acute on chronic.  he is on home oxygen at 2 LPM. Supplemental oxygen was provided and noted- Oxygen Concentration (%):  [28-40] 30     .   Signs/symptoms of respiratory failure include- tachypnea, increased work of breathing, respiratory distress and use of accessory muscles. Contributing diagnoses includes - Pleural effusion, Pneumonia and complications of lung transplant Labs and images were reviewed. Patient Has recent ABG, which has been reviewed. Will treat underlying causes and adjust management of respiratory failure as follows-      CXR with noted right sided pleural effusion.  Bedside US showed small amount of fluid noted to right side.      --continue broad spectrum antibiotics and ID consult  --Continue Bi-PAP  at night, currently weaning down oxygen  --RIP negative, follow up on aspergillus antigen and fungitell, CMV PCR negative  --Nephrology following.  Removed 3L on 7/26 and 7/27     Lung replaced by transplant  Underwent BLT on 8/22/2017 for pulmonary hypertension.  Now with CLAD.  Continue OIP and immunosuppression      Complication of lung transplant  Patient with known CLAD/JONELLE.  Dyspnea has been worsening over the last several months. Patient is a DNR.     Renal/  ESRD (end stage renal disease) on dialysis  Per Nephrology.  Patient on T,TH,Sat schedule     ID  Prophylactic antibiotic  Continue Bactrim     Immunology/Multi System  Immunosuppression  Continue tacrolimus, everolimus and resume prednisone.  Was on IV solumedrol 125 mg q6.  Will monitor tacrolimus levels while inpatient     Endocrine  Type 2 diabetes mellitus with hyperglycemia, with long-term current use of insulin  Endocrine consult.  Appreciate recs     Palliative Care  Palliative care encounter  Patient is DNR.  Palliative Care following           Preventive Measures: Nutrition: Goal: PO diet, Stress Ulcer: continue prophyllaxis, DVT: continue prophyllaxis, Head of Bet: elevated, Reposition: q2 with assistance, Physical Therapy: continue     Kenna Paz MD  Critical Care - Medicine  [facility] - Transplant Stepdown

## 2023-07-30 NOTE — CARE UPDATE
RAPID RESPONSE NURSE ROUND       Rounding completed with charge RN, Pratibha for hypotension after HD reports pt fluid responsive VSS. No additional concerns verbalized at this time. Instructed to call [phone] for further concerns or assistance.

## 2023-07-31 NOTE — PLAN OF CARE
Plan of care reviewed with the patient at the beginning of the shift. Pt admitted with acute on chronic respiratory failure. He is on 3L nasal canula. Tachypnea and dyspnea with exertion. He is on a bipap at night. Blood glucose monitoring ac and hs. Fall precautions maintained. He is up with one person assist. Pt remained free from falls and injury this shift. Bed locked in lowest position, side rails up x2, call light within reach. Instructed pt to call for assistance as needed. Pt verbalized understanding. Vitals stable. Pt afebrile overnight. No acute issues overnight. Will continue to monitor.

## 2023-07-31 NOTE — PT/OT/SLP PROGRESS
"Occupational Therapy   Treatment    Name: Martin Hendrix Jr.  MRN: [id number]  Admitting Diagnosis:  Acute on chronic respiratory failure with hypoxia       Recommendations:     Discharge Recommendations: other (see comments)  Discharge Equipment Recommendations:  to be determined by next level of care  Barriers to discharge:  Other (Comment) (increased (A) required)    Assessment:     Martin Hendrix Jr. is a 62 y.o. male with a medical diagnosis of Acute on chronic respiratory failure with hypoxia.  He presents with the following performance deficits affecting function are impaired endurance, gait instability, impaired balance, impaired cardiopulmonary response to activity. Pt demo improved functional mobility, ADL performance, and overall this date. He was able to perform in room mobility and remain static standing at sink to participate in ADLs with less assistance and SpO2 WNL throughout (95%). Pt would benefit from continued skilled acute OT services in order to maximize (I) and with ADLs and functional mobility to ensure safe return to PLOF in the least restrictive environment. OT recommending further skilled OT services once pt is medically appropriate for d/c.       Rehab Prognosis:  Good; patient would benefit from acute skilled OT services to address these deficits and reach maximum level of function.       Plan:     Patient to be seen 3 x/week to address the above listed problems via self-care/home management, therapeutic activities, therapeutic exercises  Plan of Care Expires: 08/28/23  Plan of Care Reviewed with: patient    Subjective   "I'll brush my teeth"   Chief Complaint: Tired   Patient/Family Comments/goals: Return to PLOF  Pain/Comfort:  Pain Rating 1: 0/10  Pain Rating Post-Intervention 1: 0/10    Objective:     Communicated with: RN prior to session.  Patient found HOB elevated with telemetry, pulse ox (continuous), peripheral IV, oxygen upon OT entry to room.    General Precautions: Standard, " fall    Orthopedic Precautions:N/A  Braces: N/A  Respiratory Status: Nasal cannula     Occupational Performance:     Bed Mobility:    Patient completed Scooting/Bridging with stand by assistance  Patient completed Supine to Sit with stand by assistance     Functional Mobility/Transfers:  Patient completed Sit <> Stand Transfer with stand by assistance  with  rolling walker   Functional Mobility: Pt participated in room mobility to simulate home and community distances for 15 ft +15ft with CGA  and RW.   Static standing rest break in between. SpO2 95%.      Activities of Daily Living:  Grooming: contact guard assistance for performing oral hygiene while standing at the sink.   Upper Body Dressing: minimum assistance to don gown       [facility] 6 Click ADL: 20    Treatment & Education:   Pt  educated on:   Role of OT, POC, and d/c planning.   Safe transfer techniques and proper body mechanics for fall prevention and improved independence with functional transfers   Importance of OOB activities to increase endurance and tolerance for increased participation in daily ADLs.   Energy conservation techniques   Deep breathing facilitation   Utilizing the call bell to request for assistance with all functional mobility to ensure safety during hospital stay.      Pt verbalized understanding and all questions were addressed within the scope of OT.       Patient left sitting edge of bed with all lines intact, call button in reach, and RN notified    GOALS:   Multidisciplinary Problems       Occupational Therapy Goals          Problem: Occupational Therapy    Goal Priority Disciplines Outcome Interventions   Occupational Therapy Goal     OT, PT/OT Ongoing, Progressing    Description: Goals to be met by: 8/11/23    Patient will increase functional independence with ADLs by performing:    UE Dressing with Supervision.  LE Dressing with Supervision.  Grooming while standing at sink with Supervision.  Toileting from toilet with  Supervision for hygiene and clothing management.   Supine to sit with [place].  Step transfer with Supervision.                         Time Tracking:     OT Date of Treatment: 07/31/23  OT Start Time: 1423  OT Stop Time: 1438  OT Total Time (min): 15 min    Billable Minutes:Self Care/Home Management 10    OT/FISH: OT          7/31/2023   Co-treatment performed due to patient's multiple deficits requiring two skilled therapists to appropriately and safely assess and advance patient's strength, endurance, functional mobility, and ADL performance while facilitating functional tasks in addition to accommodating for patient's activity tolerance and medical acuity.

## 2023-07-31 NOTE — SUBJECTIVE & OBJECTIVE
Subjective:     Interval History: No acute events overnight.  Feels much better.  On 2L NC    Continuous Infusions:  Scheduled Meds:   [START ON 8/1/2023] sodium chloride 0.9%   Intravenous Once    aspirin  81 mg Oral Daily    atorvastatin  20 mg Oral Daily    calcium-vitamin D3  1 tablet Oral BID    cinacalcet  30 mg Oral Daily with breakfast    epoetin greer (PROCRIT) injection  2,000 Units Intravenous Every Tues, Thurs, Sat    everolimus (immunosuppressive)  0.75 mg Oral Daily PM    [START ON 8/1/2023] everolimus (immunosuppressive)  1.5 mg Oral Daily AM    famotidine  20 mg Oral Daily    folic acid  1,000 mcg Oral Daily    heparin (porcine)  5,000 Units Subcutaneous Q8H    insulin aspart U-100  4 Units Subcutaneous TIDWM    insulin detemir U-100  8 Units Subcutaneous QHS    magnesium chloride  128 mg Oral BID    predniSONE  5 mg Oral Daily    sulfamethoxazole-trimethoprim 400-80mg  1 tablet Oral Every Mon, Wed, Fri    tacrolimus  1.5 mg Oral Daily PM    tacrolimus  2 mg Oral Daily AM    vitamin D  2,000 Units Oral Daily     PRN Meds:acetaminophen, glucagon (human recombinant), glucose, glucose, insulin aspart U-100, morphine, ondansetron, sodium chloride 0.9%    Review of patient's allergies indicates:  No Known Allergies    Review of Systems   Constitutional:  Positive for activity change. Negative for fatigue.   HENT: Negative.     Respiratory:  Positive for shortness of breath. Negative for cough, chest tightness and wheezing.    Gastrointestinal:  Negative for diarrhea, nausea and vomiting.   Endocrine: Negative.    Genitourinary:  Positive for decreased urine volume.   Allergic/Immunologic: Positive for immunocompromised state.   Neurological:  Positive for weakness (due to deconditioning).   Psychiatric/Behavioral:  Negative for confusion. The patient is nervous/anxious (at times).      Objective:        Physical Exam  Vitals reviewed.   Constitutional:       General: He is awake.      Appearance: He is  well-developed.      Interventions: Nasal cannula in place.   HENT:      Head: Normocephalic and atraumatic.   Eyes:      Extraocular Movements: Extraocular movements intact.      Conjunctiva/sclera: Conjunctivae normal.   Cardiovascular:      Rate and Rhythm: Normal rate and regular rhythm.      Heart sounds: Normal heart sounds.   Pulmonary:      Effort: No tachypnea, accessory muscle usage or respiratory distress.      Breath sounds: Examination of the right-upper field reveals rhonchi. Examination of the left-upper field reveals rhonchi. Decreased breath sounds and rhonchi present. No wheezing.   Abdominal:      General: Bowel sounds are normal.      Palpations: Abdomen is soft.      Tenderness: There is no abdominal tenderness.   Musculoskeletal:         General: Normal range of motion.   Skin:     General: Skin is warm and dry.   Neurological:      Mental Status: He is alert and oriented to person, place, and time.   Psychiatric:         Mood and Affect: Mood normal.         Speech: Speech normal.         Behavior: Behavior is cooperative.         Thought Content: Thought content normal.         Cognition and Memory: Cognition and memory normal.         Judgment: Judgment normal.                Vital Signs (Most Recent):  Temp: 97.6 °F (36.4 °C) (07/31/23 0704)  Pulse: 95 (07/31/23 0704)  Resp: 16 (07/31/23 0704)  BP: 114/64 (07/31/23 0704)  SpO2: 100 % (07/31/23 0704) Vital Signs (24h Range):  Temp:  [97.6 °F (36.4 °C)-98.6 °F (37 °C)] 97.6 °F (36.4 °C)  Pulse:  [] 95  Resp:  [12-23] 16  SpO2:  [93 %-100 %] 100 %  BP: ()/(55-66) 114/64     Weight: 82.8 kg (182 lb 8.7 oz)  Body mass index is 25.46 kg/m².      Intake/Output Summary (Last 24 hours) at 7/31/2023 1011  Last data filed at 7/31/2023 0855  Gross per 24 hour   Intake 360 ml   Output 0 ml   Net 360 ml       Significant Labs:  CBC:  Recent Labs   Lab 07/31/23  0613   WBC 10.36   RBC 3.61*   HGB 10.9*   HCT 35.3*      MCV 98   MCH 30.2    MCHC 30.9*     BMP:  Recent Labs   Lab 07/31/23  0613   *   K 4.7      CO2 18*   BUN 80*   CREATININE 7.6*   CALCIUM 9.6      Tacrolimus Levels:  Recent Labs   Lab 07/31/23 0613   TACROLIMUS 4.4*     Microbiology:  Microbiology Results (last 7 days)       Procedure Component Value Units Date/Time    Cryptococcal antigen, blood [[id number]] Collected: 07/27/23 0310    Order Status: Completed Specimen: Blood, Venous Updated: 07/27/23 0847     Cryptococcal Ag, Blood Negative    Respiratory Infection Panel (PCR), Nasopharyngeal [[id number]] Collected: 07/25/23 2131    Order Status: Completed Specimen: Nasopharyngeal Swab Updated: 07/26/23 0232     Respiratory Infection Panel Source NP Swab     Adenovirus Not Detected     Coronavirus 229E, Common Cold Virus Not Detected     Coronavirus HKU1, Common Cold Virus Not Detected     Coronavirus NL63, Common Cold Virus Not Detected     Coronavirus OC43, Common Cold Virus Not Detected     Comment: The Coronavirus strains detected in this test cause the common cold.  These strains are not the COVID-19 (novel Coronavirus)strain   associated with the respiratory disease outbreak.          SARS-CoV2 (COVID-19) Qualitative PCR Not Detected     Human Metapneumovirus Not Detected     Human Rhinovirus/Enterovirus Not Detected     Influenza B Not Detected     Parainfluenza Virus 1 Not Detected     Parainfluenza Virus 2 Not Detected     Parainfluenza Virus 3 Not Detected     Parainfluenza Virus 4 Not Detected     Respiratory Syncytial Virus Not Detected     Bordetella Parapertussis (EQ4840) Not Detected     Bordetella pertussis (ptxP) Not Detected     Chlamydia pneumoniae Not Detected     Mycoplasma pneumoniae Not Detected    Narrative:      For all other respiratory sources, order [id number] -  Respiratory Viral Panel by PCR    Respiratory Infection Panel (PCR), Nasopharyngeal [[id number]] Collected: 07/25/23 2131    Order Status: Canceled Specimen: Nasopharyngeal Swab      Culture, Respiratory with Gram Stain [[id number]]     Order Status: No result Specimen: Respiratory             I have reviewed all pertinent labs within the past 24 hours.    Diagnostic Results:  Labs: Reviewed

## 2023-07-31 NOTE — PT/OT/SLP PROGRESS
Physical Therapy Treatment    Patient Name:  Martin Hendrix Jr.   MRN:  [id number]  Admit Date: 2023  Admitting Diagnosis:  Acute on chronic respiratory failure with hypoxia   Length of Stay: 6 days  Recent Surgery: * No surgery found *      Recommendations:     Discharge Recommendations:  other (see comments) (defer to CM/SW)   Discharge Equipment Recommendations:  (TBD)   Barriers to discharge: None    Plan:     During this hospitalization, patient to be seen 3 x/week to address the listed problems via gait training, therapeutic activities, therapeutic exercises, neuromuscular re-education  Plan of Care Expires:  23  Plan of Care Reviewed with: patient    Assessment:     Martin Hendrix Jr. is a 62 y.o. male admitted with a medical diagnosis of Acute on chronic respiratory failure with hypoxia.       Problem List: impaired endurance, impaired cardiopulmonary response to activity, impaired balance, gait instability, weakness.  Rehab Prognosis: Good     GOALS:   Multidisciplinary Problems       Physical Therapy Goals          Problem: Physical Therapy    Goal Priority Disciplines Outcome Goal Variances Interventions   Physical Therapy Goal     PT, PT/OT Ongoing, Progressing     Description: Goals to be met by: 8/15/2023    Patient will increase functional independence with mobility by performin. Supine to sit with Supervision   2. Sit to stand transfer with Supervision using LRAD  3. Gait  x 50 feet with Supervision using LRAD.                          Subjective   Communicated with RN prior to session.  Patient found HOB elevated upon PT entry to room, agreeable to evaluation. Martin Hendrix Jr.'s alone during session.    Chief Complaint: fatigue; increased energy expenditure with activity   Patient/Family Comments/goals: to get better  Pain/Comfort:  Pain Rating 1: 0/10  Pain Rating Post-Intervention 1: 0/10    Objective:   Patient found with: telemetry, pulse ox (continuous), peripheral IV, oxygen  "  General Precautions: Standard, Cardiac fall   Orthopedic Precautions:N/A   Braces: N/A   Oxygen Device: Nasal Cannula   Vitals: /64 (BP Location: Right arm, Patient Position: Sitting)   Pulse 101   Temp 97.6 °F (36.4 °C) (Oral)   Resp 18   Ht 5' 11" (1.803 m)   Wt 82.8 kg (182 lb 8.7 oz)   SpO2 98%   BMI 25.46 kg/m²     Outcome Measures:  AM-PAC 6 CLICK MOBILITY  Turning over in bed (including adjusting bedclothes, sheets and blankets)?: 3  Sitting down on and standing up from a chair with arms (e.g., wheelchair, bedside commode, etc.): 3  Moving from lying on back to sitting on the side of the bed?: 3  Moving to and from a bed to a chair (including a wheelchair)?: 3  Need to walk in hospital room?: 3  Climbing 3-5 steps with a railing?: 3  Basic Mobility Total Score: 18       Functional Mobility:  Additional staff present: OT  Bed Mobility:   Supine to Sit: stand by assistance; HOB elevated  Scooting anteriorly to EOB to have both feet planted on floor: stand by assistance      Sitting Balance at Edge of Bed:  Assistance Level Required: Stand-by Assistance      Transfers:   Sit <> Stand Transfer: stand by assistance with rolling walker from EOB  Verbal cuing for hand placement        Gait:  Patient ambulated: 15ft + 15ft (ADLs standing at the sink between trials)   Patient required: contact guard  Patient used: rolling walker  Gait Pattern observed: reciprocal gait  Gait Deviation(s): occasional unsteady gait, narrow base of support, decreased bernadette, and inconsistent foot placement   Impairments due to: decreased endurance  Comments:   Writing therapist used RW for pt to encourage energy conservation  No overt LOB but pt occasionally unsteady  No SOB  Verbal cuing for purposeful steps, RW management, and pacing  SpO2 remained >90%      Therapeutic Activities, Exercises, and Education:   Educated pt on PT role/POC  Educated pt on importance of OOB activity and daily ambulation   Pt verbalized " understanding         Patient left sitting edge of bed with all lines intact, call button in reach, and RN notified..    Time Tracking:     PT Received On: 07/31/23  PT Start Time: 1420     PT Stop Time: 1438  PT Total Time (min): 18 min       Billable Minutes:   Gait Training 10    Treatment Type: Evaluation  PT/PTA: PT

## 2023-07-31 NOTE — PROGRESS NOTES
AAOx4. VSS. DNR status maintained. Patient weaned to 2L NC w/ O2 sats >90% - patient becomes tachypnic and SOB w/ exertion - O2 desats to 80s w/ exertion on 2-3L NC - patient recovers quickly when sitting at rest. Patient wears bipap at night. Patient took shower today w/ this RN's assistance. Patient up OOB to bathroom/chair w/ standby assist. Patient worked w/ PT/OT today. Patient is oliguric. Cr 7.6 (increased from 5.2 prior) on AM labs - HD scheduled for tomorrow 8/1. Cefepime IVPB d/c'd. Chest CT done today 7/31 - stable compared to prior exam. Non-skid socks on. Bed in low position. Call light within reach.

## 2023-07-31 NOTE — ASSESSMENT & PLAN NOTE
Patient with Hypercapnic and Hypoxic Respiratory failure which is Acute on chronic.  he is on home oxygen at 2 LPM. Supplemental oxygen was provided and noted- Oxygen Concentration 2L NC    .   Signs/symptoms of respiratory failure include- tachypnea, increased work of breathing, respiratory distress and use of accessory muscles. Contributing diagnoses includes - Pleural effusion, Pneumonia and complications of lung transplant Labs and images were reviewed. Patient Has recent ABG, which has been reviewed. Will treat underlying causes and adjust management of respiratory failure as follows-     CXR with noted right sided pleural effusion.  Bedside US showed small amount of fluid noted to right side.     --antibiotics stopped  --Continue Bi-PAP at night, currently weaning down oxygen  --RIP negative, aspergillus antigen, fungitell, CMV PCR negative  --Nephrology following.    --Will check chest CT today

## 2023-07-31 NOTE — PROGRESS NOTES
[facility] - [facility]plant Stepdown  Lung Transplant  Progress Note - Floor    Patient Name: Martin Hendrix Jr.  MRN: [id number]  Admission Date: 7/25/2023  Hospital Length of Stay: 6 days  Post-Operative Day: 2169  Attending Physician: Kenna Paz MD  Primary Care Provider: Michel Henley MD     Subjective:     Interval History: No acute events overnight.  Feels much better.  On 2L NC    Continuous Infusions:  Scheduled Meds:   [START ON 8/1/2023] sodium chloride 0.9%   Intravenous Once    aspirin  81 mg Oral Daily    atorvastatin  20 mg Oral Daily    calcium-vitamin D3  1 tablet Oral BID    cinacalcet  30 mg Oral Daily with breakfast    epoetin greer (PROCRIT) injection  2,000 Units Intravenous Every Tues, Thurs, Sat    everolimus (immunosuppressive)  0.75 mg Oral Daily PM    [START ON 8/1/2023] everolimus (immunosuppressive)  1.5 mg Oral Daily AM    famotidine  20 mg Oral Daily    folic acid  1,000 mcg Oral Daily    heparin (porcine)  5,000 Units Subcutaneous Q8H    insulin aspart U-100  4 Units Subcutaneous TIDWM    insulin detemir U-100  8 Units Subcutaneous QHS    magnesium chloride  128 mg Oral BID    predniSONE  5 mg Oral Daily    sulfamethoxazole-trimethoprim 400-80mg  1 tablet Oral Every Mon, Wed, Fri    tacrolimus  1.5 mg Oral Daily PM    tacrolimus  2 mg Oral Daily AM    vitamin D  2,000 Units Oral Daily     PRN Meds:acetaminophen, glucagon (human recombinant), glucose, glucose, insulin aspart U-100, morphine, ondansetron, sodium chloride 0.9%    Review of patient's allergies indicates:  No Known Allergies    Review of Systems   Constitutional:  Positive for activity change. Negative for fatigue.   HENT: Negative.     Respiratory:  Positive for shortness of breath. Negative for cough, chest tightness and wheezing.    Gastrointestinal:  Negative for diarrhea, nausea and vomiting.   Endocrine: Negative.    Genitourinary:  Positive for decreased urine volume.   Allergic/Immunologic: Positive  for immunocompromised state.   Neurological:  Positive for weakness (due to deconditioning).   Psychiatric/Behavioral:  Negative for confusion. The patient is nervous/anxious (at times).      Objective:        Physical Exam  Vitals reviewed.   Constitutional:       General: He is awake.      Appearance: He is well-developed.      Interventions: Nasal cannula in place.   HENT:      Head: Normocephalic and atraumatic.   Eyes:      Extraocular Movements: Extraocular movements intact.      Conjunctiva/sclera: Conjunctivae normal.   Cardiovascular:      Rate and Rhythm: Normal rate and regular rhythm.      Heart sounds: Normal heart sounds.   Pulmonary:      Effort: No tachypnea, accessory muscle usage or respiratory distress.      Breath sounds: Examination of the right-upper field reveals rhonchi. Examination of the left-upper field reveals rhonchi. Decreased breath sounds and rhonchi present. No wheezing.   Abdominal:      General: Bowel sounds are normal.      Palpations: Abdomen is soft.      Tenderness: There is no abdominal tenderness.   Musculoskeletal:         General: Normal range of motion.   Skin:     General: Skin is warm and dry.   Neurological:      Mental Status: He is alert and oriented to person, place, and time.   Psychiatric:         Mood and Affect: Mood normal.         Speech: Speech normal.         Behavior: Behavior is cooperative.         Thought Content: Thought content normal.         Cognition and Memory: Cognition and memory normal.         Judgment: Judgment normal.                Vital Signs (Most Recent):  Temp: 97.6 °F (36.4 °C) (07/31/23 0704)  Pulse: 95 (07/31/23 0704)  Resp: 16 (07/31/23 0704)  BP: 114/64 (07/31/23 0704)  SpO2: 100 % (07/31/23 0704) Vital Signs (24h Range):  Temp:  [97.6 °F (36.4 °C)-98.6 °F (37 °C)] 97.6 °F (36.4 °C)  Pulse:  [] 95  Resp:  [12-23] 16  SpO2:  [93 %-100 %] 100 %  BP: ()/(55-66) 114/64     Weight: 82.8 kg (182 lb 8.7 oz)  Body mass index is  25.46 kg/m².      Intake/Output Summary (Last 24 hours) at 7/31/2023 1011  Last data filed at 7/31/2023 0855  Gross per 24 hour   Intake 360 ml   Output 0 ml   Net 360 ml       Significant Labs:  CBC:  Recent Labs   Lab 07/31/23 0613   WBC 10.36   RBC 3.61*   HGB 10.9*   HCT 35.3*      MCV 98   MCH 30.2   MCHC 30.9*     BMP:  Recent Labs   Lab 07/31/23 0613   *   K 4.7      CO2 18*   BUN 80*   CREATININE 7.6*   CALCIUM 9.6      Tacrolimus Levels:  Recent Labs   Lab 07/31/23 0613   TACROLIMUS 4.4*     Microbiology:  Microbiology Results (last 7 days)       Procedure Component Value Units Date/Time    Cryptococcal antigen, blood [[id number]] Collected: 07/27/23 0310    Order Status: Completed Specimen: Blood, Venous Updated: 07/27/23 0847     Cryptococcal Ag, Blood Negative    Respiratory Infection Panel (PCR), Nasopharyngeal [[id number]] Collected: 07/25/23 2131    Order Status: Completed Specimen: Nasopharyngeal Swab Updated: 07/26/23 0232     Respiratory Infection Panel Source NP Swab     Adenovirus Not Detected     Coronavirus 229E, Common Cold Virus Not Detected     Coronavirus HKU1, Common Cold Virus Not Detected     Coronavirus NL63, Common Cold Virus Not Detected     Coronavirus OC43, Common Cold Virus Not Detected     Comment: The Coronavirus strains detected in this test cause the common cold.  These strains are not the COVID-19 (novel Coronavirus)strain   associated with the respiratory disease outbreak.          SARS-CoV2 (COVID-19) Qualitative PCR Not Detected     Human Metapneumovirus Not Detected     Human Rhinovirus/Enterovirus Not Detected     Influenza B Not Detected     Parainfluenza Virus 1 Not Detected     Parainfluenza Virus 2 Not Detected     Parainfluenza Virus 3 Not Detected     Parainfluenza Virus 4 Not Detected     Respiratory Syncytial Virus Not Detected     Bordetella Parapertussis (PC8230) Not Detected     Bordetella pertussis (ptxP) Not Detected     Chlamydia  pneumoniae Not Detected     Mycoplasma pneumoniae Not Detected    Narrative:      For all other respiratory sources, order [id number] -  Respiratory Viral Panel by PCR    Respiratory Infection Panel (PCR), Nasopharyngeal [[id number]] Collected: 07/25/23 2131    Order Status: Canceled Specimen: Nasopharyngeal Swab     Culture, Respiratory with Gram Stain [[id number]]     Order Status: No result Specimen: Respiratory             I have reviewed all pertinent labs within the past 24 hours.    Diagnostic Results:  Labs: Reviewed        Assessment/Plan:     * Acute on chronic respiratory failure with hypoxia  Patient with Hypercapnic and Hypoxic Respiratory failure which is Acute on chronic.  he is on home oxygen at 2 LPM. Supplemental oxygen was provided and noted- Oxygen Concentration 2L NC    .   Signs/symptoms of respiratory failure include- tachypnea, increased work of breathing, respiratory distress and use of accessory muscles. Contributing diagnoses includes - Pleural effusion, Pneumonia and complications of lung transplant Labs and images were reviewed. Patient Has recent ABG, which has been reviewed. Will treat underlying causes and adjust management of respiratory failure as follows-     CXR with noted right sided pleural effusion.  Bedside US showed small amount of fluid noted to right side.     --antibiotics stopped  --Continue Bi-PAP at night, currently weaning down oxygen  --RIP negative, aspergillus antigen, fungitell, CMV PCR negative  --Nephrology following.    --Will check chest CT today    Complication of lung transplant  Patient with known CLAD/JONELLE.  Dyspnea has been worsening over the last several months. Patient is a DNR.    Lung replaced by transplant  Underwent BLT on 8/22/2017 for pulmonary hypertension.  Now with CLAD.  Continue OIP and immunosuppression     Immunosuppression  Continue tacrolimus, everolimus and prednisone.  Will monitor tacrolimus levels while inpatient    Prophylactic  antibiotic  Continue Bactrim    ESRD (end stage renal disease) on dialysis  Per Nephrology.  Patient on T,TH,Sat schedule    Type 2 diabetes mellitus with hyperglycemia, with long-term current use of insulin  Endocrine consult.  Appreciate recs    Palliative care encounter  Patient is DNR.  Palliative Care following        Irwin Celestin NP  Lung Transplant  [facility] - Trans[facility] Step[facility]

## 2023-07-31 NOTE — CARE UPDATE
-Glucose Goal 140-180    -A1C:   Hemoglobin A1C   Date Value Ref Range Status   07/25/2023 5.6 4.0 - 5.6 % Final     Comment:     ADA Screening Guidelines:  5.7-6.4%  Consistent with prediabetes  >or=6.5%  Consistent with diabetes    High levels of fetal hemoglobin interfere with the HbA1C  assay. Heterozygous hemoglobin variants (HbS, HgC, etc)do  not significantly interfere with this assay.   However, presence of multiple variants may affect accuracy.           -HOME REGIMEN: Tresiba 20 unit HS & Humalog 10u AC      -GLUCOSE TREND FOR THE PAST 24HRS:   Recent Labs   Lab 07/29/23  2031 07/30/23  0832 07/30/23  1222 07/30/23  1717 07/30/23  2101 07/31/23  0840   POCTGLUCOSE 259* 131* 111* 187* 204* 114*           -NO HYPOGYCEMIAS NOTED     - Diet  Diet diabetic [facility]; 2000 Calorie    T2DM.  Hx of lung transplant w/ CLAD. BG stable on admit. Prednisone 5 mg with BG excursions.      - Levemir to 8 units nightly   - Novolog 3 units AC  - Continue MDC  - POCT Glucose before meals and at bedtime  - Hypoglycemia protocol in place      ** Please notify Endocrine for any change and/or advance in diet**  ** Please call Endocrine for any BG related issues **     Discharge Planning:   TBD. Please notify endocrinology prior to discharge.

## 2023-08-01 NOTE — NURSING
Report received from ALESSANDRA Dial RN. Patient arrived to [facility] via wheelchair. AAOx4. HD tx initiated via Left forearm AV fistula with 15g needles.

## 2023-08-01 NOTE — NURSING
3.5 hours HD tx completed. 2.150 mL of fluid removed.   Patient tolerated well.     Epoetin given as ordered.     Blood returned. Needles pulled. Manual pressure held until hemostasis was achieved.     Report given to ALESSANDRA Dial RN.

## 2023-08-01 NOTE — PROGRESS NOTES
[facility]plant [facility]  Lung Transplant  Progress Note - Floor    Patient Name: Martin Hendrix Jr.  MRN: [id number]  Admission Date: 7/25/2023  Hospital Length of Stay: 7 days  Post-Operative Day: 2170  Attending Physician: Samantha Bill DO  Primary Care Provider: Michel Henley MD     Subjective:     Interval History: No acute events overnight.  For HD today    Continuous Infusions:  Scheduled Meds:   aspirin  81 mg Oral Daily    atorvastatin  20 mg Oral Daily    calcium-vitamin D3  1 tablet Oral BID    cinacalcet  30 mg Oral Daily with breakfast    epoetin greer (PROCRIT) injection  2,000 Units Intravenous Every Tues, Thurs, Sat    famotidine  20 mg Oral Daily    folic acid  1,000 mcg Oral Daily    heparin (porcine)  5,000 Units Subcutaneous Q8H    insulin aspart U-100  4 Units Subcutaneous TIDWM    insulin detemir U-100  8 Units Subcutaneous QHS    magnesium chloride  128 mg Oral BID    predniSONE  5 mg Oral Daily    sulfamethoxazole-trimethoprim 400-80mg  1 tablet Oral Every Mon, Wed, Fri    tacrolimus  1.5 mg Oral Daily PM    tacrolimus  2 mg Oral Daily AM    vitamin D  2,000 Units Oral Daily     PRN Meds:acetaminophen, glucagon (human recombinant), glucose, glucose, insulin aspart U-100, morphine, ondansetron, sodium chloride 0.9%    Review of patient's allergies indicates:  No Known Allergies    Review of Systems   Constitutional:  Positive for activity change. Negative for fatigue.   HENT: Negative.     Respiratory:  Positive for shortness of breath. Negative for cough, chest tightness and wheezing.    Gastrointestinal:  Negative for diarrhea, nausea and vomiting.   Endocrine: Negative.    Genitourinary:  Positive for decreased urine volume.   Allergic/Immunologic: Positive for immunocompromised state.   Neurological:  Positive for weakness (due to deconditioning).   Psychiatric/Behavioral:  Negative for confusion. The patient is nervous/anxious (at times).      Objective:         Physical Exam  Vitals reviewed.   Constitutional:       General: He is awake.      Appearance: He is well-developed.      Interventions: Nasal cannula in place.   HENT:      Head: Normocephalic and atraumatic.   Eyes:      Extraocular Movements: Extraocular movements intact.      Conjunctiva/sclera: Conjunctivae normal.   Cardiovascular:      Rate and Rhythm: Normal rate and regular rhythm.      Heart sounds: Normal heart sounds.   Pulmonary:      Effort: No tachypnea, accessory muscle usage or respiratory distress.      Breath sounds: Decreased breath sounds present. No wheezing or rhonchi.   Abdominal:      General: Bowel sounds are normal.      Palpations: Abdomen is soft.      Tenderness: There is no abdominal tenderness.   Musculoskeletal:         General: Normal range of motion.   Skin:     General: Skin is warm and dry.   Neurological:      Mental Status: He is alert and oriented to person, place, and time.   Psychiatric:         Mood and Affect: Mood normal.         Speech: Speech normal.         Behavior: Behavior is cooperative.         Thought Content: Thought content normal.         Cognition and Memory: Cognition and memory normal.         Judgment: Judgment normal.                Vital Signs (Most Recent):  Temp: 98.3 °F (36.8 °C) (08/01/23 0735)  Pulse: 102 (08/01/23 1130)  Resp: 18 (08/01/23 0735)  BP: (!) 140/85 (08/01/23 1130)  SpO2: 99 % (08/01/23 0735) Vital Signs (24h Range):  Temp:  [97.6 °F (36.4 °C)-98.7 °F (37.1 °C)] 98.3 °F (36.8 °C)  Pulse:  [] 102  Resp:  [18] 18  SpO2:  [97 %-100 %] 99 %  BP: (125-157)/(65-89) 140/85     Weight: 84.7 kg (186 lb 11.7 oz)  Body mass index is 26.04 kg/m².      Intake/Output Summary (Last 24 hours) at 8/1/2023 1219  Last data filed at 8/1/2023 1130  Gross per 24 hour   Intake 360 ml   Output 2800 ml   Net -2440 ml       Significant Labs:  CBC:  Recent Labs   Lab 08/01/23  0519   WBC 9.51   RBC 3.49*   HGB 10.4*   HCT 32.9*      MCV 94   MCH  29.8   MCHC 31.6*     BMP:  Recent Labs   Lab 08/01/23  0519   *   K 4.1      CO2 17*   BUN 94*   CREATININE 9.5*   CALCIUM 8.8      Tacrolimus Levels:  Recent Labs   Lab 08/01/23 0519   TACROLIMUS 3.9*     Microbiology:  Microbiology Results (last 7 days)       Procedure Component Value Units Date/Time    Cryptococcal antigen, blood [[id number]] Collected: 07/27/23 0310    Order Status: Completed Specimen: Blood, Venous Updated: 07/27/23 0847     Cryptococcal Ag, Blood Negative    Respiratory Infection Panel (PCR), Nasopharyngeal [[id number]] Collected: 07/25/23 2131    Order Status: Completed Specimen: Nasopharyngeal Swab Updated: 07/26/23 0232     Respiratory Infection Panel Source NP Swab     Adenovirus Not Detected     Coronavirus 229E, Common Cold Virus Not Detected     Coronavirus HKU1, Common Cold Virus Not Detected     Coronavirus NL63, Common Cold Virus Not Detected     Coronavirus OC43, Common Cold Virus Not Detected     Comment: The Coronavirus strains detected in this test cause the common cold.  These strains are not the COVID-19 (novel Coronavirus)strain   associated with the respiratory disease outbreak.          SARS-CoV2 (COVID-19) Qualitative PCR Not Detected     Human Metapneumovirus Not Detected     Human Rhinovirus/Enterovirus Not Detected     Influenza B Not Detected     Parainfluenza Virus 1 Not Detected     Parainfluenza Virus 2 Not Detected     Parainfluenza Virus 3 Not Detected     Parainfluenza Virus 4 Not Detected     Respiratory Syncytial Virus Not Detected     Bordetella Parapertussis (BS1733) Not Detected     Bordetella pertussis (ptxP) Not Detected     Chlamydia pneumoniae Not Detected     Mycoplasma pneumoniae Not Detected    Narrative:      For all other respiratory sources, order [id number] -  Respiratory Viral Panel by PCR    Respiratory Infection Panel (PCR), Nasopharyngeal [[id number]] Collected: 07/25/23 2131    Order Status: Canceled Specimen: Nasopharyngeal Swab      Culture, Respiratory with Gram Stain [[id number]]     Order Status: No result Specimen: Respiratory             I have reviewed all pertinent labs within the past 24 hours.    Diagnostic Results:  Lungs and pleura: Status post bilateral lung transplant.  Stability of the previously noted pleural pulmonary abnormalities with peripheral and right-sided predominance.  Wedge-shaped consolidation like abnormality noted in the upper lobes appears stable.  Traction bronchiectasis are unchanged.  Grossly stable right-sided pleural effusion.  Asymmetric thickening of the pleural surface bilaterally appears unchanged.  Mosaic pattern is again noted in both lungs likely representing perfusion abnormalities.        Assessment/Plan:     * Acute on chronic respiratory failure with hypoxia  Patient with Hypercapnic and Hypoxic Respiratory failure which is Acute on chronic.  he is on home oxygen at 2 LPM. Supplemental oxygen was provided and noted- Oxygen Concentration 2L NC    .   Signs/symptoms of respiratory failure include- tachypnea, increased work of breathing, respiratory distress and use of accessory muscles. Contributing diagnoses includes - Pleural effusion, Pneumonia and complications of lung transplant Labs and images were reviewed. Patient Has recent ABG, which has been reviewed. Will treat underlying causes and adjust management of respiratory failure as follows-     CXR with noted right sided pleural effusion.  Bedside US showed small amount of fluid noted to right side.     --antibiotics stopped  --Continue Bi-PAP at night, currently weaning down oxygen  --RIP negative, aspergillus antigen, fungitell, CMV PCR negative  --Nephrology following.    --Will place SNF consult    Complication of lung transplant  Patient with known CLAD/JONELLE.  Dyspnea has been worsening over the last several months. Patient is a DNR.    Lung replaced by transplant  Underwent BLT on 8/22/2017 for pulmonary hypertension.  Now with CLAD.   Continue OIP and immunosuppression     Immunosuppression  Continue tacrolimus, everolimus and prednisone.  Will monitor tacrolimus levels while inpatient    Prophylactic antibiotic  Continue Bactrim    ESRD (end stage renal disease) on dialysis  Per Nephrology.  Patient on T,TH,Sat schedule    Type 2 diabetes mellitus with hyperglycemia, with long-term current use of insulin  Endocrine consult.  Appreciate recs    Palliative care encounter  Patient is DNR.  Palliative Care following        Gayle S SAUNDRA Celestin  Lung Transplant  [facility] - Transplant Step[facility]

## 2023-08-01 NOTE — TELEPHONE ENCOUNTER
"Along with Irwin Celestin, NP, I returned call to patient's daughter Sybil Hendrix to update her about the plan of care. Explained Dr. Mcdonald's plan to discharge her father home tomorrow with home Oxygen, nightly BiPAP therapy, and home health care for intermittent nursing services, PT, and OT. Sybil shared her concern about her father being discharged home, as he lives alone and she feels he needs full time assistance with ADLs and medication management. She stated that Mr. Hendrix's brother was doing the grocery shopping for him but he was not really eating because he could not fix meals for himself or perform other ADLs such as showering. She added "Dr. Bill said he shouldn't live by himself" and "Irwin Celestin, the nurse practitioner, said I'd have time to figure out a plan." At this time, Ms. Hendrix does not yet have a plan in place to move her father to her home in [place], [place] and she inquired about options for full time, long-term care in [place]. She also asked about medical insurance coverage should her father move from [place] to [place]. Informed her that his Human Medicare plan would transfer but he would lose his [place] Medicaid QMB plan. He would need to establish residency in [place] and then could apply for [place] Medicaid after a 30-day wait period. During that gap time, he would be responsible for any co-pays, deductibles, and out-of-pocket costs not covered by his Medicare plan. As for medical care in [place], we could request a transfer of post-lung transplant management to one of the lung transplant programs in [place] and his nephrology/hemodialysis care could possibly be transferred to a [facility] facility in her area. Sybil asked that we discuss a plan with Dr. Mcdonald to send her father to an inpatient facility for additional therapy while she continues to devise an outpatient care plan for her father. She also asked that our  call her to discuss long-term care options. Informed Ms. Hendrix that we will update " Dr. Mcdonald and Roxanne Watters, [place], adding that our team will call her back once we have an updated plan of care. Ms. Hendrix asked questions, which were answered to her satisfaction, and verbalized her understanding of all information discussed.       ----- Message from Silviano Harp sent at 8/1/2023  9:06 AM CDT -----  Regarding: Consult/Advisory  Contact: Sybil Hendrix, patient's daughter  Consult/Advisory      Name Of Caller: Sybil Hendrix, patient's daughter      Contact Preference: [phone]      Nature of call: Patient's daughter calling to speak to DEV Oliveira regarding inquiries as her dad will be discharged tomorrow from the hospital. Requesting a call back.

## 2023-08-01 NOTE — PLAN OF CARE
Plan of care reviewed with the patient at the beginning of the shift. Pt admitted with acute on chronic respiratory failure. He is on 2L nasal canula. Tachypnea and dyspnea with exertion, but respirations are even and unlabored at rest. He is on a bipap at night. Blood glucose monitoring ac and hs. Fall precautions maintained. He is up with one person assist. Pt remained free from falls and injury this shift. Bed locked in lowest position, side rails up x2, call light within reach. Instructed pt to call for assistance as needed. Pt verbalized understanding. Vitals stable. Pt afebrile overnight. No acute issues overnight. Will continue to monitor. Plan for dialysis this morning.

## 2023-08-01 NOTE — CARE UPDATE
-Glucose Goal 140-180    -A1C:   Hemoglobin A1C   Date Value Ref Range Status   07/25/2023 5.6 4.0 - 5.6 % Final     Comment:     ADA Screening Guidelines:  5.7-6.4%  Consistent with prediabetes  >or=6.5%  Consistent with diabetes    High levels of fetal hemoglobin interfere with the HbA1C  assay. Heterozygous hemoglobin variants (HbS, HgC, etc)do  not significantly interfere with this assay.   However, presence of multiple variants may affect accuracy.           -HOME REGIMEN: Tresiba 20 unit HS & Humalog 10u AC      -GLUCOSE TREND FOR THE PAST 24HRS:   Recent Labs   Lab 07/30/23  1717 07/30/23  2101 07/31/23  0840 07/31/23  1254 07/31/23  1718 07/31/23 2052   POCTGLUCOSE 187* 204* 114* 193* 121* 175*           -NO HYPOGYCEMIAS NOTED     - Diet  Diet diabetic [facility]; 2000 Calorie    T2DM.  Hx of lung transplant w/ CLAD. BG stable on admit. Prednisone 5 mg with BG excursions.      - Levemir to 8 units nightly   - Novolog 3 units AC  - Continue MDC  - POCT Glucose before meals and at bedtime  - Hypoglycemia protocol in place      ** Please notify Endocrine for any change and/or advance in diet**  ** Please call Endocrine for any BG related issues **     Discharge Planning:   TBD. Please notify endocrinology prior to discharge.         Chart(s)/Patient

## 2023-08-01 NOTE — PROGRESS NOTES
[facility] H[facility]ODIALYSIS NOTE    Martin Hendrix Jr. is a 62 y.o. male currently on hemodialysis for removal of uremic toxins and volume.     Patient seen and evaluated on hemodialysis, tolerating treatment, see HD flowsheet for vitals and assessments.    No Hypotension, chest pain, shortness of breath, cramping, nausea or vomiting.      Labs have been reviewed and the dialysate bath has been adjusted.     Labs:     Recent Labs   Lab 07/30/23  0647 07/31/23 0613 08/01/23 0519   * 131* 135*   K 4.3 4.7 4.1    101 103   CO2 20* 18* 17*   BUN 57* 80* 94*   CREATININE 5.2* 7.6* 9.5*   CALCIUM 8.9 9.6 8.8   PHOS 4.1 4.5 4.7*     Recent Labs   Lab 07/30/23  0647 07/31/23 0613 08/01/23 0519   WBC 9.12 10.36 9.51   HGB 11.6* 10.9* 10.4*   HCT 38.8* 35.3* 32.9*    274 269     Lab Results   Component Value Date    FESATURATED 17 (L) 03/14/2022    FERRITIN 1,619 (H) 03/14/2022        Assessment/Plan      Ultrafiltration goal: Liters: 2.5L. Duration:  3.5 hrs    Tuesday/Thursday/Saturday    - Seen on dialysis today, tolerating session with current UFR, no complications.    - Pre HD weight 80.6 kg (EDW 87.5 kg); Will update patient's OP HD unit regarding adjusting previously DW. Oxygenating well on 2 L NC.   - Will attempt pre/post weights with HD to prevent hemodynamic instability.   - Encouraged patient to limit fluid intake to 32 oz per day.   - No lab stick/BP intake on access site  - Continue to monitor intake and output, daily weights   - Please avoid gadolinium, fleets, phos-based laxatives, NSAIDs  - Will follow closely and continue dialysis treatments while in-patient    Anemia  - Hgb 10.4, Continue ROGELIO with dialysis treatments    BMM  - Renal diet with protein intake goal 1.5 g/kg/d if appropriate   - Novasource with meals   - F/U PO4, Mg, Calcium. And albumin levels.   - Phos 4.7. No binders.   - Continue Cinacalcet     HTN  - BP Elevated  - Goal for BP <140mmHg SBP and <90 mmHg DBP.  Maintain MAP > 65 mmHg.  - Continue home antihypertensive regimen; adjust as needed.       Cait Finley DNP, APRN, FNP-C  Nephrology Department  Pager:  [phone]

## 2023-08-01 NOTE — PLAN OF CARE
VSS  No signs of evident distress or complaint of pain  Pt. Ambulating with one person assist.  Non slip socks worn when OOB  2L NC with oxygen saturations in the upper 90s  BIPAP q. HS  Left fistula +/+  Right FA 20g PIV dressing CDI  Tele showing NSR to sinus tach  Accuchecks ordered AC&HS.  See MAR for insulin admin.  Plan for possible discharge tomorrow to SNF  Bed in lowest locked position.  Call light within reach.  Instructed to call for assistance.  Pt. Expressed understanding  Educated on plan of care.

## 2023-08-01 NOTE — TELEPHONE ENCOUNTER
SW returned call to pt's daughter. Daughter expressed concern re: pt's discharge options, as pt is scheduled to tentatively discharge home tomorrow. Daughter states prior to this hospitalization, pt wasn't able to complete ADLs on his own. Daughter states her uncle was helping pt to grocery shop, but daughter feels that he wasn't eating enough and expressed concern re: pt's recent weight loss. Daughter inquiring what options pt has at discharge, as she feels he cannot safely live on his own. Daughter states pt will not want to go to a nursing home. SW explained pt could enroll for programs such as Meals on Wheels, but 27[facility]7 care in the home is usually paid for OOP by pts. Daughter verbalized understanding. SW states she will discuss safe discharge options with LUT team and follow up with daughter at later time.    13:45 update - DEV spoke to pt's daughter via telephone to provide update. SW explained referral to SNF has been placed. Daughter states she does not think pt will be agreeable to transferring to a SNF, as he thinks of this as a nursing home. Daughter states pt wants to discharge home and have his brother move in with him, but daughter voiced concern that pt's brother won't be able to handle being pt's full time caregiver. Daughter states she has spoken to pt about moving to [place] and moving in with her. Daughter states she is pregnant and doesn't know if she can provide full time care to pt either. Daughter states pt was agreeable to moving to [place] and asked SW to discuss with LUT if this is a realistic option with pt's health declining. Daughter emphasized that pt will not agree to being transferred to a nursing home. SW states she will relay this information to LUT team and will follow up at later date/time. Daughter is aware of how to contact SW; SW remains available.         ----- Message from Silviano Harp sent at 8/1/2023  9:06 AM CDT -----  Regarding: Consult/Advisory  Contact: Sybil Hendrix,  patient's daughter  Consult/Advisory      Name Of Caller: Sybil Hendrix, patient's daughter      Contact Preference: [phone]      Nature of call: Patient's daughter calling to speak to DEV Oliveira regarding inquiries as her dad will be discharged tomorrow from the hospital. Requesting a call back.

## 2023-08-01 NOTE — ASSESSMENT & PLAN NOTE
Patient with Hypercapnic and Hypoxic Respiratory failure which is Acute on chronic.  he is on home oxygen at 2 LPM. Supplemental oxygen was provided and noted- Oxygen Concentration 2L NC    .   Signs/symptoms of respiratory failure include- tachypnea, increased work of breathing, respiratory distress and use of accessory muscles. Contributing diagnoses includes - Pleural effusion, Pneumonia and complications of lung transplant Labs and images were reviewed. Patient Has recent ABG, which has been reviewed. Will treat underlying causes and adjust management of respiratory failure as follows-     CXR with noted right sided pleural effusion.  Bedside US showed small amount of fluid noted to right side.     --antibiotics stopped  --Continue Bi-PAP at night, currently weaning down oxygen  --RIP negative, aspergillus antigen, fungitell, CMV PCR negative  --Nephrology following.    --Will place SNF consult

## 2023-08-01 NOTE — SUBJECTIVE & OBJECTIVE
Subjective:     Interval History: No acute events overnight.  For HD today    Continuous Infusions:  Scheduled Meds:   aspirin  81 mg Oral Daily    atorvastatin  20 mg Oral Daily    calcium-vitamin D3  1 tablet Oral BID    cinacalcet  30 mg Oral Daily with breakfast    epoetin greer (PROCRIT) injection  2,000 Units Intravenous Every Tues, Thurs, Sat    famotidine  20 mg Oral Daily    folic acid  1,000 mcg Oral Daily    heparin (porcine)  5,000 Units Subcutaneous Q8H    insulin aspart U-100  4 Units Subcutaneous TIDWM    insulin detemir U-100  8 Units Subcutaneous QHS    magnesium chloride  128 mg Oral BID    predniSONE  5 mg Oral Daily    sulfamethoxazole-trimethoprim 400-80mg  1 tablet Oral Every Mon, Wed, Fri    tacrolimus  1.5 mg Oral Daily PM    tacrolimus  2 mg Oral Daily AM    vitamin D  2,000 Units Oral Daily     PRN Meds:acetaminophen, glucagon (human recombinant), glucose, glucose, insulin aspart U-100, morphine, ondansetron, sodium chloride 0.9%    Review of patient's allergies indicates:  No Known Allergies    Review of Systems   Constitutional:  Positive for activity change. Negative for fatigue.   HENT: Negative.     Respiratory:  Positive for shortness of breath. Negative for cough, chest tightness and wheezing.    Gastrointestinal:  Negative for diarrhea, nausea and vomiting.   Endocrine: Negative.    Genitourinary:  Positive for decreased urine volume.   Allergic/Immunologic: Positive for immunocompromised state.   Neurological:  Positive for weakness (due to deconditioning).   Psychiatric/Behavioral:  Negative for confusion. The patient is nervous/anxious (at times).      Objective:        Physical Exam  Vitals reviewed.   Constitutional:       General: He is awake.      Appearance: He is well-developed.      Interventions: Nasal cannula in place.   HENT:      Head: Normocephalic and atraumatic.   Eyes:      Extraocular Movements: Extraocular movements intact.      Conjunctiva/sclera: Conjunctivae  normal.   Cardiovascular:      Rate and Rhythm: Normal rate and regular rhythm.      Heart sounds: Normal heart sounds.   Pulmonary:      Effort: No tachypnea, accessory muscle usage or respiratory distress.      Breath sounds: Decreased breath sounds present. No wheezing or rhonchi.   Abdominal:      General: Bowel sounds are normal.      Palpations: Abdomen is soft.      Tenderness: There is no abdominal tenderness.   Musculoskeletal:         General: Normal range of motion.   Skin:     General: Skin is warm and dry.   Neurological:      Mental Status: He is alert and oriented to person, place, and time.   Psychiatric:         Mood and Affect: Mood normal.         Speech: Speech normal.         Behavior: Behavior is cooperative.         Thought Content: Thought content normal.         Cognition and Memory: Cognition and memory normal.         Judgment: Judgment normal.                Vital Signs (Most Recent):  Temp: 98.3 °F (36.8 °C) (08/01/23 0735)  Pulse: 102 (08/01/23 1130)  Resp: 18 (08/01/23 0735)  BP: (!) 140/85 (08/01/23 1130)  SpO2: 99 % (08/01/23 0735) Vital Signs (24h Range):  Temp:  [97.6 °F (36.4 °C)-98.7 °F (37.1 °C)] 98.3 °F (36.8 °C)  Pulse:  [] 102  Resp:  [18] 18  SpO2:  [97 %-100 %] 99 %  BP: (125-157)/(65-89) 140/85     Weight: 84.7 kg (186 lb 11.7 oz)  Body mass index is 26.04 kg/m².      Intake/Output Summary (Last 24 hours) at 8/1/2023 1219  Last data filed at 8/1/2023 1130  Gross per 24 hour   Intake 360 ml   Output 2800 ml   Net -2440 ml       Significant Labs:  CBC:  Recent Labs   Lab 08/01/23 0519   WBC 9.51   RBC 3.49*   HGB 10.4*   HCT 32.9*      MCV 94   MCH 29.8   MCHC 31.6*     BMP:  Recent Labs   Lab 08/01/23 0519   *   K 4.1      CO2 17*   BUN 94*   CREATININE 9.5*   CALCIUM 8.8      Tacrolimus Levels:  Recent Labs   Lab 08/01/23 0519   TACROLIMUS 3.9*     Microbiology:  Microbiology Results (last 7 days)       Procedure Component Value Units Date/Time     Cryptococcal antigen, blood [[id number]] Collected: 07/27/23 0310    Order Status: Completed Specimen: Blood, Venous Updated: 07/27/23 0847     Cryptococcal Ag, Blood Negative    Respiratory Infection Panel (PCR), Nasopharyngeal [[id number]] Collected: 07/25/23 2131    Order Status: Completed Specimen: Nasopharyngeal Swab Updated: 07/26/23 0232     Respiratory Infection Panel Source NP Swab     Adenovirus Not Detected     Coronavirus 229E, Common Cold Virus Not Detected     Coronavirus HKU1, Common Cold Virus Not Detected     Coronavirus NL63, Common Cold Virus Not Detected     Coronavirus OC43, Common Cold Virus Not Detected     Comment: The Coronavirus strains detected in this test cause the common cold.  These strains are not the COVID-19 (novel Coronavirus)strain   associated with the respiratory disease outbreak.          SARS-CoV2 (COVID-19) Qualitative PCR Not Detected     Human Metapneumovirus Not Detected     Human Rhinovirus/Enterovirus Not Detected     Influenza B Not Detected     Parainfluenza Virus 1 Not Detected     Parainfluenza Virus 2 Not Detected     Parainfluenza Virus 3 Not Detected     Parainfluenza Virus 4 Not Detected     Respiratory Syncytial Virus Not Detected     Bordetella Parapertussis (HP8687) Not Detected     Bordetella pertussis (ptxP) Not Detected     Chlamydia pneumoniae Not Detected     Mycoplasma pneumoniae Not Detected    Narrative:      For all other respiratory sources, order [id number] -  Respiratory Viral Panel by PCR    Respiratory Infection Panel (PCR), Nasopharyngeal [[id number]] Collected: 07/25/23 2131    Order Status: Canceled Specimen: Nasopharyngeal Swab     Culture, Respiratory with Gram Stain [[id number]]     Order Status: No result Specimen: Respiratory             I have reviewed all pertinent labs within the past 24 hours.    Diagnostic Results:  Lungs and pleura: Status post bilateral lung transplant.  Stability of the previously noted pleural pulmonary abnormalities  with peripheral and right-sided predominance.  Wedge-shaped consolidation like abnormality noted in the upper lobes appears stable.  Traction bronchiectasis are unchanged.  Grossly stable right-sided pleural effusion.  Asymmetric thickening of the pleural surface bilaterally appears unchanged.  Mosaic pattern is again noted in both lungs likely representing perfusion abnormalities.

## 2023-08-01 NOTE — NURSING
VSS  No signs of evident distress or complaint of pain  Pt. To HD via wheelchair with patient escort.  2L NC

## 2023-08-02 NOTE — PROGRESS NOTES
[facility] - Transplant Stepdown  Lung Transplant  Progress Note - Floor    Patient Name: Martin Hendrix Jr.  MRN: [id number]  Admission Date: 7/25/2023  Hospital Length of Stay: 8 days  Post-Operative Day: 2171  Attending Physician: Samantha Bill DO  Primary Care Provider: Michel Henley MD     Subjective:     Interval History: No acute events overnight. Will plan on going to SNF, consult placed.    Continuous Infusions:  Scheduled Meds:   [START ON 8/3/2023] sodium chloride 0.9%   Intravenous Once    aspirin  81 mg Oral Daily    atorvastatin  20 mg Oral Daily    calcium-vitamin D3  1 tablet Oral BID    cinacalcet  30 mg Oral Daily with breakfast    epoetin greer (PROCRIT) injection  2,000 Units Intravenous Every Tues, Thurs, Sat    famotidine  20 mg Oral Daily    folic acid  1,000 mcg Oral Daily    heparin (porcine)  5,000 Units Subcutaneous Q8H    insulin aspart U-100  4 Units Subcutaneous TIDWM    insulin detemir U-100  8 Units Subcutaneous QHS    magnesium chloride  128 mg Oral BID    predniSONE  5 mg Oral Daily    sulfamethoxazole-trimethoprim 400-80mg  1 tablet Oral Every Mon, Wed, Fri    tacrolimus  1.5 mg Oral Daily PM    tacrolimus  2 mg Oral Daily AM    vitamin D  2,000 Units Oral Daily     PRN Meds:acetaminophen, glucagon (human recombinant), glucose, glucose, insulin aspart U-100, morphine, ondansetron, sodium chloride 0.9%    Review of patient's allergies indicates:  No Known Allergies    Review of Systems   Constitutional:  Positive for activity change. Negative for fatigue.   HENT: Negative.     Respiratory:  Positive for shortness of breath. Negative for cough, chest tightness and wheezing.    Gastrointestinal:  Negative for diarrhea, nausea and vomiting.   Endocrine: Negative.    Genitourinary:  Positive for decreased urine volume.   Allergic/Immunologic: Positive for immunocompromised state.   Neurological:  Positive for weakness (due to deconditioning).    Psychiatric/Behavioral:  Negative for confusion. The patient is nervous/anxious (at times).      Objective:        Physical Exam  Vitals reviewed.   Constitutional:       General: He is awake.      Appearance: He is well-developed.      Interventions: Nasal cannula in place.   HENT:      Head: Normocephalic and atraumatic.   Eyes:      Extraocular Movements: Extraocular movements intact.      Conjunctiva/sclera: Conjunctivae normal.   Cardiovascular:      Rate and Rhythm: Normal rate and regular rhythm.      Heart sounds: Normal heart sounds.   Pulmonary:      Effort: No tachypnea, accessory muscle usage or respiratory distress.      Breath sounds: Decreased breath sounds present. No wheezing or rhonchi.   Abdominal:      General: Bowel sounds are normal.      Palpations: Abdomen is soft.      Tenderness: There is no abdominal tenderness.   Musculoskeletal:         General: Normal range of motion.   Skin:     General: Skin is warm and dry.   Neurological:      Mental Status: He is alert and oriented to person, place, and time.   Psychiatric:         Mood and Affect: Mood normal.         Speech: Speech normal.         Behavior: Behavior is cooperative.         Thought Content: Thought content normal.         Cognition and Memory: Cognition and memory normal.         Judgment: Judgment normal.                Vital Signs (Most Recent):  Temp: 97.9 °F (36.6 °C) (08/02/23 0833)  Pulse: 100 (08/02/23 0833)  Resp: 18 (08/02/23 0833)  BP: (!) 164/78 (08/02/23 0833)  SpO2: 98 % (08/02/23 0833) Vital Signs (24h Range):  Temp:  [97.9 °F (36.6 °C)-98.7 °F (37.1 °C)] 97.9 °F (36.6 °C)  Pulse:  [] 100  Resp:  [17-18] 18  SpO2:  [97 %-100 %] 98 %  BP: (133-164)/(65-85) 164/78     Weight: 84.7 kg (186 lb 11.7 oz)  Body mass index is 26.04 kg/m².      Intake/Output Summary (Last 24 hours) at 8/2/2023 1050  Last data filed at 8/2/2023 0616  Gross per 24 hour   Intake 350 ml   Output 3050 ml   Net -2700 ml       Significant  Labs:  CBC:  Recent Labs   Lab 08/02/23 0459   WBC 8.27   RBC 3.84*   HGB 11.3*   HCT 36.3*      MCV 95   MCH 29.4   MCHC 31.1*     BMP:  Recent Labs   Lab 08/02/23 0459      K 4.2      CO2 19*   BUN 49*   CREATININE 7.3*   CALCIUM 8.8      Tacrolimus Levels:  Recent Labs   Lab 08/02/23 0459   TACROLIMUS 3.4*     Microbiology:  Microbiology Results (last 7 days)       Procedure Component Value Units Date/Time    Cryptococcal antigen, blood [[id number]] Collected: 07/27/23 0310    Order Status: Completed Specimen: Blood, Venous Updated: 07/27/23 0847     Cryptococcal Ag, Blood Negative            I have reviewed all pertinent labs within the past 24 hours.    Diagnostic Results:  Labs: Reviewed        Assessment/Plan:     * Acute on chronic respiratory failure with hypoxia  Patient with Hypercapnic and Hypoxic Respiratory failure which is Acute on chronic.  he is on home oxygen at 2 LPM. Supplemental oxygen was provided and noted- Oxygen Concentration 2L NC  .   Signs/symptoms of respiratory failure include- tachypnea, increased work of breathing, respiratory distress and use of accessory muscles. Contributing diagnoses includes - Pleural effusion, Pneumonia and complications of lung transplant Labs and images were reviewed. Patient Has recent ABG, which has been reviewed. Will treat underlying causes and adjust management of respiratory failure as follows-     CXR with noted right sided pleural effusion.  Bedside US showed small amount of fluid noted to right side.     --antibiotics stopped  --Continue Bi-PAP at night, currently weaning down oxygen  --RIP negative, aspergillus antigen and CMV PCR negative  --Nephrology following.    --SNF consult placed  --No increase in oxygen requirements based on walk in horn    Complication of lung transplant  Patient with known CLAD/JONELLE.  Dyspnea has been worsening over the last several months. Patient is a DNR.    Lung replaced by transplant  Underwent BLT  on 8/22/2017 for pulmonary hypertension.  Now with CLAD.  Continue OIP and immunosuppression     Immunosuppression  Continue tacrolimus, everolimus and prednisone.  Will monitor tacrolimus levels while inpatient    Prophylactic antibiotic  Continue Bactrim    ESRD (end stage renal disease) on dialysis  Per Nephrology.  Patient on T,TH,Sat schedule    Type 2 diabetes mellitus with hyperglycemia, with long-term current use of insulin  Endocrine consult.  Appreciate recs    Palliative care encounter  Patient is DNR.  Palliative Care following        Galye S SAUNDRA Celestin  Lung Transplant  [facility] - Transplant Stepdown

## 2023-08-02 NOTE — SUBJECTIVE & OBJECTIVE
Subjective:     Interval History: No acute events overnight. Will plan on going to SNF, consult placed.    Continuous Infusions:  Scheduled Meds:   [START ON 8/3/2023] sodium chloride 0.9%   Intravenous Once    aspirin  81 mg Oral Daily    atorvastatin  20 mg Oral Daily    calcium-vitamin D3  1 tablet Oral BID    cinacalcet  30 mg Oral Daily with breakfast    epoetin greer (PROCRIT) injection  2,000 Units Intravenous Every Tues, Thurs, Sat    famotidine  20 mg Oral Daily    folic acid  1,000 mcg Oral Daily    heparin (porcine)  5,000 Units Subcutaneous Q8H    insulin aspart U-100  4 Units Subcutaneous TIDWM    insulin detemir U-100  8 Units Subcutaneous QHS    magnesium chloride  128 mg Oral BID    predniSONE  5 mg Oral Daily    sulfamethoxazole-trimethoprim 400-80mg  1 tablet Oral Every Mon, Wed, Fri    tacrolimus  1.5 mg Oral Daily PM    tacrolimus  2 mg Oral Daily AM    vitamin D  2,000 Units Oral Daily     PRN Meds:acetaminophen, glucagon (human recombinant), glucose, glucose, insulin aspart U-100, morphine, ondansetron, sodium chloride 0.9%    Review of patient's allergies indicates:  No Known Allergies    Review of Systems   Constitutional:  Positive for activity change. Negative for fatigue.   HENT: Negative.     Respiratory:  Positive for shortness of breath. Negative for cough, chest tightness and wheezing.    Gastrointestinal:  Negative for diarrhea, nausea and vomiting.   Endocrine: Negative.    Genitourinary:  Positive for decreased urine volume.   Allergic/Immunologic: Positive for immunocompromised state.   Neurological:  Positive for weakness (due to deconditioning).   Psychiatric/Behavioral:  Negative for confusion. The patient is nervous/anxious (at times).      Objective:        Physical Exam  Vitals reviewed.   Constitutional:       General: He is awake.      Appearance: He is well-developed.      Interventions: Nasal cannula in place.   HENT:      Head: Normocephalic and atraumatic.   Eyes:       Extraocular Movements: Extraocular movements intact.      Conjunctiva/sclera: Conjunctivae normal.   Cardiovascular:      Rate and Rhythm: Normal rate and regular rhythm.      Heart sounds: Normal heart sounds.   Pulmonary:      Effort: No tachypnea, accessory muscle usage or respiratory distress.      Breath sounds: Decreased breath sounds present. No wheezing or rhonchi.   Abdominal:      General: Bowel sounds are normal.      Palpations: Abdomen is soft.      Tenderness: There is no abdominal tenderness.   Musculoskeletal:         General: Normal range of motion.   Skin:     General: Skin is warm and dry.   Neurological:      Mental Status: He is alert and oriented to person, place, and time.   Psychiatric:         Mood and Affect: Mood normal.         Speech: Speech normal.         Behavior: Behavior is cooperative.         Thought Content: Thought content normal.         Cognition and Memory: Cognition and memory normal.         Judgment: Judgment normal.                Vital Signs (Most Recent):  Temp: 97.9 °F (36.6 °C) (08/02/23 0833)  Pulse: 100 (08/02/23 0833)  Resp: 18 (08/02/23 0833)  BP: (!) 164/78 (08/02/23 0833)  SpO2: 98 % (08/02/23 0833) Vital Signs (24h Range):  Temp:  [97.9 °F (36.6 °C)-98.7 °F (37.1 °C)] 97.9 °F (36.6 °C)  Pulse:  [] 100  Resp:  [17-18] 18  SpO2:  [97 %-100 %] 98 %  BP: (133-164)/(65-85) 164/78     Weight: 84.7 kg (186 lb 11.7 oz)  Body mass index is 26.04 kg/m².      Intake/Output Summary (Last 24 hours) at 8/2/2023 1058  Last data filed at 8/2/2023 0616  Gross per 24 hour   Intake 350 ml   Output 3050 ml   Net -2700 ml       Significant Labs:  CBC:  Recent Labs   Lab 08/02/23 0459   WBC 8.27   RBC 3.84*   HGB 11.3*   HCT 36.3*      MCV 95   MCH 29.4   MCHC 31.1*     BMP:  Recent Labs   Lab 08/02/23 0459      K 4.2      CO2 19*   BUN 49*   CREATININE 7.3*   CALCIUM 8.8      Tacrolimus Levels:  Recent Labs   Lab 08/02/23  0459   TACROLIMUS 3.4*      Microbiology:  Microbiology Results (last 7 days)       Procedure Component Value Units Date/Time    Cryptococcal antigen, blood [[id number]] Collected: 07/27/23 0310    Order Status: Completed Specimen: Blood, Venous Updated: 07/27/23 0847     Cryptococcal Ag, Blood Negative            I have reviewed all pertinent labs within the past 24 hours.    Diagnostic Results:  Labs: Reviewed

## 2023-08-02 NOTE — ASSESSMENT & PLAN NOTE
62 y.o. White Male ESRD-HD M-W-F presents to ED on 7/25/2023 with diagnosis of: Bilateral pneumonia [J18.9];Acute on chronic respiratory failure with hypoxia [J96.21]   Nephrology consulted for inpatient ESRD-HD management    ESRD on IHD  TTS  -  Last iHD 7/25  [facility]/ Dr. Zenia Peacock.  Duration of outpatient dialysis session - 3.5 hrs  EDW: 87.5 kg   Access: LUE brachiocephalic fistula, created 10/13/2021 by Dr Thomason    Assessment:   - Will plan for HD tomorrow per OP schedule. No emergent need for HD today.   - Continue to monitor intake and output  - Please avoid gadolinium, fleets, phos-based laxatives, NSAIDs  - Dialysis thrice weekly unless more urgent indications arise. Will evaluate RRT requirements Daily.    Anemia of ESRD   Recent Labs   Lab 07/31/23  0613 08/01/23  0519 08/02/23  0459   WBC 10.36 9.51 8.27   HGB 10.9* 10.4* 11.3*   HCT 35.3* 32.9* 36.3*    269 294     Lab Results   Component Value Date    FESATURATED 17 (L) 03/14/2022    FERRITIN 1,619 (H) 03/14/2022       - Goal in ESRD is Hgb of 10-11. Hgb 11.3  - Epogen with HD    Mineral Bone Disease in ESRD   Lab Results   Component Value Date    .5 (H) 02/07/2022    CALCIUM 8.8 08/02/2023    ALBUMIN 3.1 (L) 08/02/2023    CAION 1.20 07/25/2023    PHOS 3.5 08/02/2023       - F/U PO4, Mg, Calcium. And albumin levels.   - Renal diet with protein intake goal 1.5 g/kg/d with 1 L fluid restriction when appropriate  - Phos 3.5, no binders  - Continue Cinacalcet   - Daily renal panel so that phos and albumin is monitored daily.

## 2023-08-02 NOTE — PROGRESS NOTES
[facility] - Transplant Stepdown  Nephrology  Progress Note    Patient Name: Martin Hendrix Jr.  MRN: [id number]  Admission Date: 7/25/2023  Hospital Length of Stay: 8 days  Attending Provider: Samantha Bill DO   Primary Care Physician: Michel Henley MD  Principal Problem:Acute on chronic respiratory failure with hypoxia    Subjective:     Interval History:   HD completed on yesterday with 2 L removed. No distress on exam. Oxygenating well on NC.     Review of patient's allergies indicates:  No Known Allergies  Current Facility-Administered Medications   Medication Frequency    [START ON 8/3/2023] 0.9%  NaCl infusion Once    acetaminophen tablet 1,000 mg Q6H PRN    aspirin EC tablet 81 mg Daily    atorvastatin tablet 20 mg Daily    calcium-vitamin D3 500 mg-5 mcg (200 unit) per tablet 1 tablet BID    cinacalcet tablet 30 mg Daily with breakfast    epoetin greer injection 2,000 Units Every Tues, Thurs, Sat    famotidine tablet 20 mg Daily    folic acid tablet 1,000 mcg Daily    glucagon (human recombinant) injection 1 mg PRN    glucose chewable tablet 16 g PRN    glucose chewable tablet 24 g PRN    heparin (porcine) injection 5,000 Units Q8H    insulin aspart U-100 pen 1-10 Units QID (AC + HS) PRN    insulin aspart U-100 pen 4 Units TIDWM    insulin detemir U-100 (Levemir) pen 8 Units QHS    magnesium chloride TBEC tablet TbEC 128 mg BID    morphine injection 2 mg Q4H PRN    ondansetron disintegrating tablet 8 mg Q8H PRN    predniSONE tablet 5 mg Daily    sodium chloride 0.9% flush 10 mL PRN    sulfamethoxazole-trimethoprim 400-80mg per tablet 1 tablet Every Mon, Wed, Fri    tacrolimus capsule 1.5 mg Daily PM    tacrolimus capsule 2 mg Daily AM    vitamin D 1000 units tablet 2,000 Units Daily     Facility-Administered Medications Ordered in Other Encounters   Medication Frequency    0.9%  NaCl infusion Continuous    cefazolin (ANCEF) 2 gram in dextrose 5% 50 mL IVPB (premix) On Call  Procedure       Objective:     Vital Signs (Most Recent):  Temp: 97.9 °F (36.6 °C) (08/02/23 0833)  Pulse: 100 (08/02/23 0833)  Resp: 18 (08/02/23 0833)  BP: (!) 164/78 (08/02/23 0833)  SpO2: 98 % (08/02/23 0833) Vital Signs (24h Range):  Temp:  [97.9 °F (36.6 °C)-98.7 °F (37.1 °C)] 97.9 °F (36.6 °C)  Pulse:  [] 100  Resp:  [17-18] 18  SpO2:  [97 %-100 %] 98 %  BP: (133-164)/(65-85) 164/78     Weight: 84.7 kg (186 lb 11.7 oz) (08/01/23 0400)  Body mass index is 26.04 kg/m².  Body surface area is 2.06 meters squared.    I/O last 3 completed shifts:  In: 470 [P.O.:470]  Out: 3050 [Urine:250; Other:2800]     Physical Exam  Vitals and nursing note reviewed.   Constitutional:       General: He is not in acute distress.     Appearance: Normal appearance. He is obese. He is ill-appearing.      Interventions: Nasal cannula in place.   HENT:      Head: Normocephalic and atraumatic.      Right Ear: External ear normal.      Left Ear: External ear normal.      Nose: Nose normal.      Mouth/Throat:      Mouth: Mucous membranes are moist.   Eyes:      General: No scleral icterus.     Extraocular Movements: Extraocular movements intact.      Conjunctiva/sclera: Conjunctivae normal.   Cardiovascular:      Rate and Rhythm: Normal rate and regular rhythm.      Heart sounds: Normal heart sounds.      Arteriovenous access: Left arteriovenous access is present.  Pulmonary:      Effort: Pulmonary effort is normal. No respiratory distress.   Abdominal:      General: Abdomen is flat. Bowel sounds are normal. There is no distension.      Palpations: Abdomen is soft.      Tenderness: There is no abdominal tenderness.   Musculoskeletal:         General: No swelling or deformity. Normal range of motion.      Cervical back: Normal range of motion.      Right lower leg: No edema.      Left lower leg: No edema.   Skin:     General: Skin is warm and dry.   Neurological:      General: No focal deficit present.      Mental Status: He is  alert.   Psychiatric:         Mood and Affect: Mood normal.         Behavior: Behavior normal.          Significant Labs:  CBC:   Recent Labs   Lab 08/02/23  0459   WBC 8.27   RBC 3.84*   HGB 11.3*   HCT 36.3*      MCV 95   MCH 29.4   MCHC 31.1*     CMP:   Recent Labs   Lab 08/02/23  0459      CALCIUM 8.8   ALBUMIN 3.1*   PROT 6.6      K 4.2   CO2 19*      BUN 49*   CREATININE 7.3*   ALKPHOS 89   ALT 12   AST 16   BILITOT 0.5     All labs within the past 24 hours have been reviewed.       Assessment/Plan:     Pulmonary  * Acute on chronic respiratory failure with hypoxia  - defer to primary team     Oxygen dependent  - defer to primary team, oxygenating well on NC    Lung replaced by transplant  - sp lung transplant x 2 in 2017 2/2 sarcoidosis and pulmonary HTN  - lung transplant service following     Renal/  ESRD (end stage renal disease) on dialysis  62 y.o. White Male ESRD-HD M-W-F presents to ED on 7/25/2023 with diagnosis of: Bilateral pneumonia [J18.9];Acute on chronic respiratory failure with hypoxia [J96.21]   Nephrology consulted for inpatient ESRD-HD management    ESRD on IHD  TTS  -  Last iHD 7/25  [facility]/ Dr. Zenia Peacock.  Duration of outpatient dialysis session - 3.5 hrs  EDW: 87.5 kg   Access: LUE brachiocephalic fistula, created 10/13/2021 by Dr Thomason    Assessment:   - Will plan for HD tomorrow per OP schedule. No emergent need for HD today.   - Continue to monitor intake and output  - Please avoid gadolinium, fleets, phos-based laxatives, NSAIDs  - Dialysis thrice weekly unless more urgent indications arise. Will evaluate RRT requirements Daily.    Anemia of ESRD   Recent Labs   Lab 07/31/23  0613 08/01/23  0519 08/02/23  0459   WBC 10.36 9.51 8.27   HGB 10.9* 10.4* 11.3*   HCT 35.3* 32.9* 36.3*    269 294     Lab Results   Component Value Date    FESATURATED 17 (L) 03/14/2022    FERRITIN 1,619 (H) 03/14/2022       -  Goal in ESRD is Hgb of 10-11. Hgb 11.3  - Epogen with HD    Mineral Bone Disease in ESRD   Lab Results   Component Value Date    .5 (H) 02/07/2022    CALCIUM 8.8 08/02/2023    ALBUMIN 3.1 (L) 08/02/2023    CAION 1.20 07/25/2023    PHOS 3.5 08/02/2023       - F/U PO4, Mg, Calcium. And albumin levels.   - Renal diet with protein intake goal 1.5 g/kg/d with 1 L fluid restriction when appropriate  - Phos 3.5, no binders  - Continue Cinacalcet   - Daily renal panel so that phos and albumin is monitored daily.           Thank you for your consult. I will follow-up with patient. Please contact us if you have any additional questions.    Cait Finley, GURPREET, FNP-C  Nephrology  [facility] - Transplant Stepdown

## 2023-08-02 NOTE — SUBJECTIVE & OBJECTIVE
Interval History:   HD completed on yesterday with 2 L removed. No distress on exam. Oxygenating well on NC.     Review of patient's allergies indicates:  No Known Allergies  Current Facility-Administered Medications   Medication Frequency    [START ON 8/3/2023] 0.9%  NaCl infusion Once    acetaminophen tablet 1,000 mg Q6H PRN    aspirin EC tablet 81 mg Daily    atorvastatin tablet 20 mg Daily    calcium-vitamin D3 500 mg-5 mcg (200 unit) per tablet 1 tablet BID    cinacalcet tablet 30 mg Daily with breakfast    epoetin greer injection 2,000 Units Every Tues, Thurs, Sat    famotidine tablet 20 mg Daily    folic acid tablet 1,000 mcg Daily    glucagon (human recombinant) injection 1 mg PRN    glucose chewable tablet 16 g PRN    glucose chewable tablet 24 g PRN    heparin (porcine) injection 5,000 Units Q8H    insulin aspart U-100 pen 1-10 Units QID (AC + HS) PRN    insulin aspart U-100 pen 4 Units TIDWM    insulin detemir U-100 (Levemir) pen 8 Units QHS    magnesium chloride TBEC tablet TbEC 128 mg BID    morphine injection 2 mg Q4H PRN    ondansetron disintegrating tablet 8 mg Q8H PRN    predniSONE tablet 5 mg Daily    sodium chloride 0.9% flush 10 mL PRN    sulfamethoxazole-trimethoprim 400-80mg per tablet 1 tablet Every Mon, Wed, Fri    tacrolimus capsule 1.5 mg Daily PM    tacrolimus capsule 2 mg Daily AM    vitamin D 1000 units tablet 2,000 Units Daily     Facility-Administered Medications Ordered in Other Encounters   Medication Frequency    0.9%  NaCl infusion Continuous    cefazolin (ANCEF) 2 gram in dextrose 5% 50 mL IVPB (premix) On Call Procedure       Objective:     Vital Signs (Most Recent):  Temp: 97.9 °F (36.6 °C) (08/02/23 0833)  Pulse: 100 (08/02/23 0833)  Resp: 18 (08/02/23 0833)  BP: (!) 164/78 (08/02/23 0833)  SpO2: 98 % (08/02/23 0833) Vital Signs (24h Range):  Temp:  [97.9 °F (36.6 °C)-98.7 °F (37.1 °C)] 97.9 °F (36.6 °C)  Pulse:  [] 100  Resp:  [17-18] 18  SpO2:  [97 %-100 %] 98 %  BP:  (133-164)/(65-85) 164/78     Weight: 84.7 kg (186 lb 11.7 oz) (08/01/23 0400)  Body mass index is 26.04 kg/m².  Body surface area is 2.06 meters squared.    I/O last 3 completed shifts:  In: 470 [P.O.:470]  Out: 3050 [Urine:250; Other:2800]     Physical Exam  Vitals and nursing note reviewed.   Constitutional:       General: He is not in acute distress.     Appearance: Normal appearance. He is obese. He is ill-appearing.      Interventions: Nasal cannula in place.   HENT:      Head: Normocephalic and atraumatic.      Right Ear: External ear normal.      Left Ear: External ear normal.      Nose: Nose normal.      Mouth/Throat:      Mouth: Mucous membranes are moist.   Eyes:      General: No scleral icterus.     Extraocular Movements: Extraocular movements intact.      Conjunctiva/sclera: Conjunctivae normal.   Cardiovascular:      Rate and Rhythm: Normal rate and regular rhythm.      Heart sounds: Normal heart sounds.      Arteriovenous access: Left arteriovenous access is present.  Pulmonary:      Effort: Pulmonary effort is normal. No respiratory distress.   Abdominal:      General: Abdomen is flat. Bowel sounds are normal. There is no distension.      Palpations: Abdomen is soft.      Tenderness: There is no abdominal tenderness.   Musculoskeletal:         General: No swelling or deformity. Normal range of motion.      Cervical back: Normal range of motion.      Right lower leg: No edema.      Left lower leg: No edema.   Skin:     General: Skin is warm and dry.   Neurological:      General: No focal deficit present.      Mental Status: He is alert.   Psychiatric:         Mood and Affect: Mood normal.         Behavior: Behavior normal.          Significant Labs:  CBC:   Recent Labs   Lab 08/02/23  0459   WBC 8.27   RBC 3.84*   HGB 11.3*   HCT 36.3*      MCV 95   MCH 29.4   MCHC 31.1*     CMP:   Recent Labs   Lab 08/02/23  0459      CALCIUM 8.8   ALBUMIN 3.1*   PROT 6.6      K 4.2   CO2 19*   CL  104   BUN 49*   CREATININE 7.3*   ALKPHOS 89   ALT 12   AST 16   BILITOT 0.5     All labs within the past 24 hours have been reviewed.

## 2023-08-02 NOTE — ASSESSMENT & PLAN NOTE
Patient with Hypercapnic and Hypoxic Respiratory failure which is Acute on chronic.  he is on home oxygen at 2 LPM. Supplemental oxygen was provided and noted- Oxygen Concentration 2L NC  .   Signs/symptoms of respiratory failure include- tachypnea, increased work of breathing, respiratory distress and use of accessory muscles. Contributing diagnoses includes - Pleural effusion, Pneumonia and complications of lung transplant Labs and images were reviewed. Patient Has recent ABG, which has been reviewed. Will treat underlying causes and adjust management of respiratory failure as follows-     CXR with noted right sided pleural effusion.  Bedside US showed small amount of fluid noted to right side.     --antibiotics stopped  --Continue Bi-PAP at night, currently weaning down oxygen  --RIP negative, aspergillus antigen and CMV PCR negative  --Nephrology following.    --SNF consult placed  --No increase in oxygen requirements based on walk in horn

## 2023-08-02 NOTE — CARE UPDATE
-Glucose Goal 140-180    -A1C:   Hemoglobin A1C   Date Value Ref Range Status   07/25/2023 5.6 4.0 - 5.6 % Final     Comment:     ADA Screening Guidelines:  5.7-6.4%  Consistent with prediabetes  >or=6.5%  Consistent with diabetes    High levels of fetal hemoglobin interfere with the HbA1C  assay. Heterozygous hemoglobin variants (HbS, HgC, etc)do  not significantly interfere with this assay.   However, presence of multiple variants may affect accuracy.           -HOME REGIMEN: Tresiba 20 unit HS & Humalog 10u AC      -GLUCOSE TREND FOR THE PAST 24HRS:   Recent Labs   Lab 07/31/23  1254 07/31/23  1718 07/31/23  2052 08/01/23  1238 08/01/23  1720 08/01/23 2027   POCTGLUCOSE 193* 121* 175* 152* 215* 147*           -NO HYPOGYCEMIAS NOTED     - Diet  Diet diabetic [facility]; 2000 Calorie    T2DM.  Hx of lung transplant w/ CLAD. BG stable on admit. Prednisone 5 mg with BG excursions.      - Levemir to 8 units nightly   - Novolog 4 units AC  - Continue MDC  - POCT Glucose before meals and at bedtime  - Hypoglycemia protocol in place      ** Please notify Endocrine for any change and/or advance in diet**  ** Please call Endocrine for any BG related issues **     Discharge Planning:   TBD. Please notify endocrinology prior to discharge.

## 2023-08-02 NOTE — PLAN OF CARE
VSS  NO signs of evident distress or complaint of pain  Pt. Ambulating with standby assistance.  Non slip socks worn when OOB  2L NC with oxygen saturation in the upper 90s  Family at bedside.  Attentive to patient needs  Left fistula +/+  Right FA PIV dressing CDI  Tele showing NSR-ST  Accuchecks AC&HS.  See MAR for insulin admin  Plan for SNF placement.  Bed in lowest locked position.  Call light within reach.  Instructed to call for assistance.  Pt. Expressed understanding.  Educated on plan of care.

## 2023-08-02 NOTE — PLAN OF CARE
Pt aaox4. Pt ambulatory c 1 person assist. VSS. Pt c poor tolerance to Bipap-wore ~2hrs o/n. Sats stable on 2L NC. On HD-minimal uop production. Plan for poss d/c to SNF today.

## 2023-08-02 NOTE — PROGRESS NOTES
Discharge Planning:    SW met with patient while on rounds with team. Patient presents awake, alert and oriented x 4 and communicative. Patient reports coping appropriately with ongoing medical issues. Pt continues to have good support from family. Pt reports his sister is in town and visiting him at bedside. Psychosocial support provided to the patient. Referral to [facility] sent by DEV. DEV in communication w/ Jackelin Celestin (ph: [phone]) re: status referral. Pt is aware of and involved in discharge planning. SW will continue to provide psychosocial support, education, resources and assistance with all discharge planning needs as appropriate. Pt aware of how to contact SW. SW remains available.

## 2023-08-02 NOTE — PT/OT/SLP PROGRESS
"Occupational Therapy   Treatment    Name: Martin Hendrix Jr.  MRN: [id number]  Admitting Diagnosis:  Acute on chronic respiratory failure with hypoxia       Recommendations:     Discharge Recommendations: other (see comments)  Discharge Equipment Recommendations:  to be determined by next level of care  Barriers to discharge:       Assessment:     Martin Hendrix Jr. is a 62 y.o. male with a medical diagnosis of Acute on chronic respiratory failure with hypoxia.  He presents with decreased assist to complete ADLs (toileting, G/H at sink, LB dress) and less assist for fx mob this date. Pt c/ mild SOB after fx mob and ADL task completion c/ O2 sats decrease to upper 80's and return to mid 90's c/ less than 30 seconds seated break. Performance deficits affecting function are weakness, impaired endurance, impaired functional mobility, impaired balance, impaired cardiopulmonary response to activity.     Rehab Prognosis:  Good; patient would benefit from acute skilled OT services to address these deficits and reach maximum level of function.       Plan:     Patient to be seen 3 x/week to address the above listed problems via self-care/home management, therapeutic activities, therapeutic exercises  Plan of Care Expires: 08/28/23  Plan of Care Reviewed with: patient    Subjective     Chief Complaint: none given  Patient/Family Comments/goals: "I didn't have to use the bathroom but might as well try."  Pain/Comfort:  Pain Rating 1: 0/10    Objective:     Communicated with: RN prior to session.  Patient found up in chair with telemetry, pulse ox (continuous), oxygen upon OT entry to room.    General Precautions: Standard, fall    Orthopedic Precautions:N/A  Braces: N/A  Respiratory Status: Nasal cannula, flow 2 L/min     Occupational Performance:       Functional Mobility/Transfers:  Patient completed Sit <> Stand Transfer with stand by assistance  with  no assistive device   Patient completed chair <> Chair Transfer using Step " Transfer technique with contact guard assistance with no assistive device  Patient completed Toilet Transfer Step Transfer technique with contact guard assistance with  no AD and hand-held assist  Functional Mobility: pt ambulated in room ~10ft x2 trials to toilet, sink, and back to chair c/ CGA and hand held assist    Activities of Daily Living:  Grooming: stand by assistance standing at sink   Lower Body Dressing: stand by assistance socks  Toileting: stand by assistance pericare and clothes mgmt at std toilet      [facility] 6 Click ADL: 20    Treatment & Education:  Role of OT and POC  Call light use for needs  ADL retraining  Functional mobility training  Safety  Discharge planning  Importance EOB/OOB activity      Patient left up in chair with all lines intact, call button in reach, and RN notified    GOALS:   Multidisciplinary Problems       Occupational Therapy Goals          Problem: Occupational Therapy    Goal Priority Disciplines Outcome Interventions   Occupational Therapy Goal     OT, PT/OT Ongoing, Progressing    Description: Goals to be met by: 8/11/23    Patient will increase functional independence with ADLs by performing:    UE Dressing with Supervision.  LE Dressing with Supervision.  Grooming while standing at sink with Supervision.  Toileting from toilet with Supervision for hygiene and clothing management.   Supine to sit with Coke.  Step transfer with Supervision.                         Time Tracking:     OT Date of Treatment: 08/02/23  OT Start Time: 1011  OT Stop Time: 1036  OT Total Time (min): 25 min    Billable Minutes:Self Care/Home Management 15  Therapeutic Activity 10    OT/FISH: OT          8/2/2023

## 2023-08-02 NOTE — PT/OT/SLP PROGRESS
Physical Therapy Treatment    Patient Name:  Martin Hendrix Jr.   MRN:  [id number]  Admit Date: 2023  Admitting Diagnosis:  Acute on chronic respiratory failure with hypoxia   Length of Stay: 8 days  Recent Surgery: * No surgery found *      Recommendations:     Discharge Recommendations:  other (see comments) (defer to CM/SW)   Discharge Equipment Recommendations: walker, rolling   Barriers to discharge: None    Plan:     During this hospitalization, patient to be seen 3 x/week to address the listed problems via gait training, therapeutic activities, therapeutic exercises, neuromuscular re-education  Plan of Care Expires:  23  Plan of Care Reviewed with: patient    Assessment:     Martin Hendrix Jr. is a 62 y.o. male admitted with a medical diagnosis of Acute on chronic respiratory failure with hypoxia. Pt found alert and cooperative. Pt continues with mild to moderate SOB with activity. SpO2 remained > 90% on 2L NC throughout the session. Pt progressing towards goals, but not at PLOF. Pt tolerated session well. Pt is improving with therapy evidenced by increased gait distance and improved activity tolerance. Plan to start weaning patient off of RW. Pt would benefit from continued PT services to improve overall functional mobility.       Problem List: weakness, impaired endurance, impaired functional mobility, gait instability, impaired balance, impaired cardiopulmonary response to activity.  Rehab Prognosis: Good     GOALS:   Multidisciplinary Problems       Physical Therapy Goals          Problem: Physical Therapy    Goal Priority Disciplines Outcome Goal Variances Interventions   Physical Therapy Goal     PT, PT/OT Ongoing, Progressing     Description: Goals to be met by: 8/15/2023    Patient will increase functional independence with mobility by performin. Supine to sit with Supervision   2. Sit to stand transfer with Supervision using LRAD  3. Gait  x 50 feet with Supervision using LRAD.        "                   Subjective   Communicated with RN prior to session.  Patient found HOB elevated upon PT entry to room, agreeable to evaluation. Martin Hendrix Jr.'s RN present during session.    Chief Complaint: impaired endurance  Patient/Family Comments/goals: to get better  Pain/Comfort:  Pain Rating 1: 0/10  Pain Rating Post-Intervention 1: 0/10    Objective:   Patient found with: telemetry, pulse ox (continuous), peripheral IV, oxygen   General Precautions: Standard, Cardiac fall   Orthopedic Precautions:N/A   Braces: N/A   Oxygen Device: Nasal Cannula   Vitals: BP (!) 164/78 (BP Location: Right arm, Patient Position: Sitting)   Pulse 100   Temp 97.9 °F (36.6 °C) (Oral)   Resp 18   Ht 5' 11" (1.803 m)   Wt 84.7 kg (186 lb 11.7 oz)   SpO2 98%   BMI 26.04 kg/m²     Outcome Measures:  AM-PAC 6 CLICK MOBILITY  Turning over in bed (including adjusting bedclothes, sheets and blankets)?: 3  Sitting down on and standing up from a chair with arms (e.g., wheelchair, bedside commode, etc.): 3  Moving from lying on back to sitting on the side of the bed?: 3  Moving to and from a bed to a chair (including a wheelchair)?: 3  Need to walk in hospital room?: 3  Climbing 3-5 steps with a railing?: 3  Basic Mobility Total Score: 18     Functional Mobility:  Additional staff present: N/A  Bed Mobility:   Supine to Sit: supervision; HOB elevated  Scooting anteriorly to EOB to have both feet planted on floor: supervision    Sitting Balance at Edge of Bed:  Assistance Level Required: Supervision  Time: 3 minutes  Comments:   Worked on activity tolerance sitting EOB and worked on tolerance to positional change     Transfers:   Sit <> Stand Transfer: contact guard assistance with rolling walker from EOB  Verba mariana for hand placement       Gait:  Patient ambulated: 40ft +40ft (standing rest break between trials)   Patient required: contact guard  Patient used: rolling walker  Gait Pattern observed: reciprocal gait  Gait " Deviation(s): occasional unsteady gait and decreased bernadette  Impairments due to: decreased endurance and impaired balance   Comments:   No overt LOB but pt occasionally unsteady  Mild to moderate SOB - SpO2 97% during ambulation trials  Verbal cuing for upright posture, forward gaze, pacing, deep breathing, and RW management       Therapeutic Activities, Exercises, and Education:   Educated pt on PT role/POC  Educated pt on importance of OOB activity and daily ambulation   Pt verbalized understanding     Pt performed ADLs (brushing teeth, washing face) standing at the sink with SBA  Pt took occasional rest breaks due to impaired endurance    T/f to chair to increase tolerance to OOB activity and to create optimal positioning for lung expansion     Patient left up in chair with all lines intact, call button in reach, and RN notified..    Time Tracking:     PT Received On: 08/02/23  PT Start Time: 0829     PT Stop Time: 0852  PT Total Time (min): 23 min       Billable Minutes:   Gait Training 15 and Therapeutic Activity 8    Treatment Type: Treatment  PT/PTA: PT

## 2023-08-03 NOTE — PROGRESS NOTES
[facility] - Transplant Stepdown  Lung Transplant  Progress Note - Floor    Patient Name: Martin Hendrix Jr.  MRN: [id number]  Admission Date: 7/25/2023  Hospital Length of Stay: 9 days  Post-Operative Day: 2172  Attending Physician: Samantha Bill DO  Primary Care Provider: Michel Henley MD     Subjective:     Interval History: Had HD today.  Awaiting SNF placement    Continuous Infusions:  Scheduled Meds:   aspirin  81 mg Oral Daily    atorvastatin  20 mg Oral Daily    calcium-vitamin D3  1 tablet Oral BID    cinacalcet  30 mg Oral Daily with breakfast    epoetin greer (PROCRIT) injection  2,000 Units Intravenous Every Tues, Thurs, Sat    famotidine  20 mg Oral Daily    folic acid  1,000 mcg Oral Daily    heparin (porcine)  5,000 Units Subcutaneous Q8H    insulin aspart U-100  4 Units Subcutaneous TIDWM    insulin detemir U-100  8 Units Subcutaneous QHS    magnesium chloride  128 mg Oral BID    predniSONE  5 mg Oral Daily    sulfamethoxazole-trimethoprim 400-80mg  1 tablet Oral Every Mon, Wed, Fri    tacrolimus  1.5 mg Oral Daily PM    tacrolimus  2 mg Oral Daily AM    vitamin D  2,000 Units Oral Daily     PRN Meds:acetaminophen, glucagon (human recombinant), glucose, glucose, insulin aspart U-100, morphine, ondansetron, sodium chloride 0.9%    Review of patient's allergies indicates:  No Known Allergies    Review of Systems   Constitutional:  Positive for activity change. Negative for fatigue.   HENT: Negative.     Respiratory:  Positive for shortness of breath. Negative for cough, chest tightness and wheezing.    Gastrointestinal:  Negative for diarrhea, nausea and vomiting.   Endocrine: Negative.    Genitourinary:  Positive for decreased urine volume.   Allergic/Immunologic: Positive for immunocompromised state.   Neurological:  Positive for weakness (due to deconditioning).   Psychiatric/Behavioral:  Negative for confusion. The patient is nervous/anxious (at times).      Objective:         Physical Exam  Vitals reviewed.   Constitutional:       General: He is awake.      Appearance: He is well-developed.      Interventions: Nasal cannula in place.   HENT:      Head: Normocephalic and atraumatic.   Eyes:      Extraocular Movements: Extraocular movements intact.      Conjunctiva/sclera: Conjunctivae normal.   Cardiovascular:      Rate and Rhythm: Normal rate and regular rhythm.      Heart sounds: Normal heart sounds.   Pulmonary:      Effort: No tachypnea, accessory muscle usage or respiratory distress.      Breath sounds: Decreased breath sounds present. No wheezing or rhonchi.   Abdominal:      General: Bowel sounds are normal.      Palpations: Abdomen is soft.      Tenderness: There is no abdominal tenderness.   Musculoskeletal:         General: Normal range of motion.   Skin:     General: Skin is warm and dry.   Neurological:      Mental Status: He is alert and oriented to person, place, and time.   Psychiatric:         Mood and Affect: Mood normal.         Speech: Speech normal.         Behavior: Behavior is cooperative.         Thought Content: Thought content normal.         Cognition and Memory: Cognition and memory normal.         Judgment: Judgment normal.                Vital Signs (Most Recent):  Temp: 98 °F (36.7 °C) (08/03/23 1100)  Pulse: 101 (08/03/23 1100)  Resp: 18 (08/03/23 1100)  BP: 123/79 (08/03/23 1100)  SpO2: 100 % (08/03/23 1100) Vital Signs (24h Range):  Temp:  [98 °F (36.7 °C)-98.9 °F (37.2 °C)] 98 °F (36.7 °C)  Pulse:  [] 101  Resp:  [17-20] 18  SpO2:  [97 %-100 %] 100 %  BP: (123-185)/(65-97) 123/79     Weight: 84.7 kg (186 lb 11.7 oz)  Body mass index is 26.04 kg/m².      Intake/Output Summary (Last 24 hours) at 8/3/2023 1248  Last data filed at 8/3/2023 1100  Gross per 24 hour   Intake 725 ml   Output 3100 ml   Net -2375 ml       Significant Labs:  CBC:  Recent Labs   Lab 08/03/23  0619   WBC 9.27   RBC 3.37*   HGB 10.0*   HCT 32.2*      MCV 96   MCH 29.7    MCHC 31.1*     BMP:  Recent Labs   Lab 08/03/23  0619   *   K 4.5      CO2 19*   BUN 71*   CREATININE 9.4*   CALCIUM 8.8      Tacrolimus Levels:  Recent Labs   Lab 08/03/23  0619   TACROLIMUS 3.7*     Microbiology:  Microbiology Results (last 7 days)       ** No results found for the last 168 hours. **            I have reviewed all pertinent labs within the past 24 hours.    Diagnostic Results:  Labs: Reviewed        Assessment/Plan:     * Acute on chronic respiratory failure with hypoxia  Patient with Hypercapnic and Hypoxic Respiratory failure which is Acute on chronic.  he is on home oxygen at 2 LPM. Supplemental oxygen was provided and noted- Oxygen Concentration 2L NC  .   Signs/symptoms of respiratory failure include- tachypnea, increased work of breathing, respiratory distress and use of accessory muscles. Contributing diagnoses includes - Pleural effusion, Pneumonia and complications of lung transplant Labs and images were reviewed. Patient Has recent ABG, which has been reviewed. Will treat underlying causes and adjust management of respiratory failure as follows-     CXR with noted right sided pleural effusion.  Bedside US showed small amount of fluid noted to right side.     --antibiotics stopped  --Continue Bi-PAP at night, currently weaning down oxygen  --RIP negative, aspergillus antigen and CMV PCR negative  --Nephrology following.    --SNF consult placed  --No increase in oxygen requirements based on walk in horn    Complication of lung transplant  Patient with known CLAD/JONELLE.  Dyspnea has been worsening over the last several months. Patient is a DNR.    Lung replaced by transplant  Underwent BLT on 8/22/2017 for pulmonary hypertension.  Now with CLAD.  Continue OIP and immunosuppression     Immunosuppression  Continue tacrolimus, everolimus and prednisone.  Will monitor tacrolimus levels while inpatient    Prophylactic antibiotic  Continue Bactrim    ESRD (end stage renal disease) on  dialysis  Per Nephrology.  Patient on T,TH,Sat schedule    Type 2 diabetes mellitus with hyperglycemia, with long-term current use of insulin  Endocrine consult.  Appreciate recs    Palliative care encounter  Patient is DNR.  Palliative Care following        Gayle S SAUNDRA Celestin  Lung Transplant  [facility] - Transplant Stepdown

## 2023-08-03 NOTE — PLAN OF CARE
Pt aaox4. Ambulatory c 1 person assist. Denies pain. VSS. Pt cont to not tolerate bipap at night for more than 1-2 hrs. Plan for HD in AM. SNF referral pending.

## 2023-08-03 NOTE — SUBJECTIVE & OBJECTIVE
Subjective:     Interval History: Had HD today.  Awaiting SNF placement    Continuous Infusions:  Scheduled Meds:   aspirin  81 mg Oral Daily    atorvastatin  20 mg Oral Daily    calcium-vitamin D3  1 tablet Oral BID    cinacalcet  30 mg Oral Daily with breakfast    epoetin greer (PROCRIT) injection  2,000 Units Intravenous Every Tues, Thurs, Sat    famotidine  20 mg Oral Daily    folic acid  1,000 mcg Oral Daily    heparin (porcine)  5,000 Units Subcutaneous Q8H    insulin aspart U-100  4 Units Subcutaneous TIDWM    insulin detemir U-100  8 Units Subcutaneous QHS    magnesium chloride  128 mg Oral BID    predniSONE  5 mg Oral Daily    sulfamethoxazole-trimethoprim 400-80mg  1 tablet Oral Every Mon, Wed, Fri    tacrolimus  1.5 mg Oral Daily PM    tacrolimus  2 mg Oral Daily AM    vitamin D  2,000 Units Oral Daily     PRN Meds:acetaminophen, glucagon (human recombinant), glucose, glucose, insulin aspart U-100, morphine, ondansetron, sodium chloride 0.9%    Review of patient's allergies indicates:  No Known Allergies    Review of Systems   Constitutional:  Positive for activity change. Negative for fatigue.   HENT: Negative.     Respiratory:  Positive for shortness of breath. Negative for cough, chest tightness and wheezing.    Gastrointestinal:  Negative for diarrhea, nausea and vomiting.   Endocrine: Negative.    Genitourinary:  Positive for decreased urine volume.   Allergic/Immunologic: Positive for immunocompromised state.   Neurological:  Positive for weakness (due to deconditioning).   Psychiatric/Behavioral:  Negative for confusion. The patient is nervous/anxious (at times).      Objective:        Physical Exam  Vitals reviewed.   Constitutional:       General: He is awake.      Appearance: He is well-developed.      Interventions: Nasal cannula in place.   HENT:      Head: Normocephalic and atraumatic.   Eyes:      Extraocular Movements: Extraocular movements intact.      Conjunctiva/sclera: Conjunctivae  normal.   Cardiovascular:      Rate and Rhythm: Normal rate and regular rhythm.      Heart sounds: Normal heart sounds.   Pulmonary:      Effort: No tachypnea, accessory muscle usage or respiratory distress.      Breath sounds: Decreased breath sounds present. No wheezing or rhonchi.   Abdominal:      General: Bowel sounds are normal.      Palpations: Abdomen is soft.      Tenderness: There is no abdominal tenderness.   Musculoskeletal:         General: Normal range of motion.   Skin:     General: Skin is warm and dry.   Neurological:      Mental Status: He is alert and oriented to person, place, and time.   Psychiatric:         Mood and Affect: Mood normal.         Speech: Speech normal.         Behavior: Behavior is cooperative.         Thought Content: Thought content normal.         Cognition and Memory: Cognition and memory normal.         Judgment: Judgment normal.                Vital Signs (Most Recent):  Temp: 98 °F (36.7 °C) (08/03/23 1100)  Pulse: 101 (08/03/23 1100)  Resp: 18 (08/03/23 1100)  BP: 123/79 (08/03/23 1100)  SpO2: 100 % (08/03/23 1100) Vital Signs (24h Range):  Temp:  [98 °F (36.7 °C)-98.9 °F (37.2 °C)] 98 °F (36.7 °C)  Pulse:  [] 101  Resp:  [17-20] 18  SpO2:  [97 %-100 %] 100 %  BP: (123-185)/(65-97) 123/79     Weight: 84.7 kg (186 lb 11.7 oz)  Body mass index is 26.04 kg/m².      Intake/Output Summary (Last 24 hours) at 8/3/2023 1248  Last data filed at 8/3/2023 1100  Gross per 24 hour   Intake 725 ml   Output 3100 ml   Net -2375 ml       Significant Labs:  CBC:  Recent Labs   Lab 08/03/23 0619   WBC 9.27   RBC 3.37*   HGB 10.0*   HCT 32.2*      MCV 96   MCH 29.7   MCHC 31.1*     BMP:  Recent Labs   Lab 08/03/23 0619   *   K 4.5      CO2 19*   BUN 71*   CREATININE 9.4*   CALCIUM 8.8      Tacrolimus Levels:  Recent Labs   Lab 08/03/23 0619   TACROLIMUS 3.7*     Microbiology:  Microbiology Results (last 7 days)       ** No results found for the last 168 hours. **             I have reviewed all pertinent labs within the past 24 hours.    Diagnostic Results:  Labs: Reviewed

## 2023-08-03 NOTE — PROGRESS NOTES
Patient received in dialysis unit per wheelchair. Maintenance dialysis started via 15 gauge fistula needles to LFA fistula.

## 2023-08-03 NOTE — CARE UPDATE
-Glucose Goal 140-180    -A1C:   Hemoglobin A1C   Date Value Ref Range Status   07/25/2023 5.6 4.0 - 5.6 % Final     Comment:     ADA Screening Guidelines:  5.7-6.4%  Consistent with prediabetes  >or=6.5%  Consistent with diabetes    High levels of fetal hemoglobin interfere with the HbA1C  assay. Heterozygous hemoglobin variants (HbS, HgC, etc)do  not significantly interfere with this assay.   However, presence of multiple variants may affect accuracy.           -HOME REGIMEN: Tresiba 20 unit HS & Humalog 10u AC      -GLUCOSE TREND FOR THE PAST 24HRS:   Recent Labs   Lab 08/01/23  1238 08/01/23  1720 08/01/23 2027 08/02/23  1235 08/02/23  1652 08/02/23 2051   POCTGLUCOSE 152* 215* 147* 165* 192* 250*           -NO HYPOGYCEMIAS NOTED     - Diet  Diet diabetic [facility]; 2000 Calorie    T2DM.  Hx of lung transplant w/ CLAD.   BG mostly stable with some mild elevations. Prednisone 5 mg.  Will continue to monitor on current regimen     - Levemir to 8 units nightly   - Novolog 4 units AC  - Continue MDC  - POCT Glucose before meals and at bedtime  - Hypoglycemia protocol in place      ** Please notify Endocrine for any change and/or advance in diet**  ** Please call Endocrine for any BG related issues **     Discharge Planning:   TBD. Please notify endocrinology prior to discharge.

## 2023-08-03 NOTE — PROGRESS NOTES
Dialysis completed. [place] removed from LFA fistula with pressure held to sites for 5 minutes each with hemostasis achieved. Gauze dressing applied. Patient dialyzed for 3 hours with fluid removal of 2.5 liters. Tolerated well with stable vital signs. Patient returned to his room by wheelchair.

## 2023-08-03 NOTE — PLAN OF CARE
VSS  No signs of evident distress or complaint of pain  Pt. Ambulating in room and on unit with standby assistance.  Non slip socks worn when OOB  2L NC with oxygen saturation in the mid to upper 90s  BIPAP ordered q. HS  Right FA 20g PIV dressing CDI.  Due to be changed 8/2.  Pt. Refused.    Left FA fistula +/+  Tele showing NSR to sinus tach  Accuchecks AC&HS.  See MAR for insulin admin  HD with 2.5L removed  Bed in lowest locked position.  Call light within reach.  Instructed to call for assistance.  Pt. Expressed understanding.  Educated on plan of care.  Awaiting SNF placement

## 2023-08-03 NOTE — PROGRESS NOTES
INPATIENT HEMODIALYSIS NOTE  Patient evaluated while undergoing hemodialysis indicated for ESRD. Tolerating session with current UFR, no complications.     Lung txp recipient. Acute on chronic respiratory failure with hypoxia. ESRD on HD.    ESRD, dialysis regimen:  Question Answer   Antibiotics on HD? No   Duration of Treatment 3 hours   Dialyzer F160   Dialysate Temperature (C) 36.5    mL/min    mL/min   K+ Potassium per Protocol   Ca++ Calcium per Protocol   Na+ Sodium per Protocol   Bicarb Bicarbonate per Protocol   Access Other (please specify)   Fluid Removal (L) 2.5L   Fluid Removal Instructions maintain SBP > 90 mmHG   Dialysate Bath Solution Protocol (DO NOT MODIFY ANSWER) [email or web address] Dialysate Bath Solution Algorithm (formatted with date).pdf       HTN   BP Readings from Last 3 Encounters:   08/03/23 (!) 159/86   07/25/23 105/63   06/07/23 (!) 146/91   Gained 2.8kg from the last HD  - UF goal: 2.5L  - Continue current anti-HTN regimen    Anemia:   Lab Results   Component Value Date    HGB 10.0 (L) 08/03/2023    IRON 57 03/14/2022    TRANSFERRIN 224 03/14/2022    TRANSFERRIN 224 03/14/2022    TIBC 332 03/14/2022    FESATURATED 17 (L) 03/14/2022      Based on current lab  - Continue EPO therapy.  - Please repeat iron study  - Transfusion as needed per primary team    Hyperphosphatemia:   Lab Results   Component Value Date    .5 (H) 02/07/2022    CALCIUM 8.8 08/03/2023    CAION 1.20 07/25/2023    PHOS 3.2 08/03/2023    PHOS 3.5 08/02/2023     - Low phos diet  - Not on phos binder

## 2023-08-04 NOTE — PROGRESS NOTES
"[facility] - Transplant Stepdown  Adult Nutrition  Progress Note    SUMMARY       Recommendations    1. Continue Diabetic diet - rec'd add renal restriction.      2. If PO intake <50%, add Novasource renal BID      3. RD to monitor and follow    Goals: Meet % EEN, EPN by RD f/u  Nutrition Goal Status: new  Communication of RD Recs:  (POC)    Assessment and Plan    Nutrition Problem  Increased nutrient needs (protein, energy)    Related to (etiology):   Increased physiological demands    Signs and Symptoms (as evidenced by):   ESRD on HD    Interventions/Recommendations (treatment strategy):  Collaboration with other providers    Nutrition Diagnosis Status:   New     Reason for Assessment    Reason For Assessment: length of stay  Diagnosis:  (acute on chronic respiratory failure with hypoxia)  Relevant Medical History: immunosuppression, lung replaced by transplant, T2DM, ESRD  Interdisciplinary Rounds: did not attend    General Information Comments:   LOS 10 days. Pt reports good appetite, tolerating % meals. Deneis N/V/D/C, chewing/swallowing difficulties. Stated  lb. Per wt hx, pt lost 14 lb (7%) x 6 months (not significant). NFPE completed, noted mild muscle depletion. Does not meet criteria for malnutrition at this time.     Nutrition Discharge Planning: Adequate PO intake    Nutrition Risk Screen    Nutrition Risk Screen: no indicators present    Nutrition/Diet History    Spiritual, Cultural Beliefs, Gnosticism Practices, Values that Affect Care: no    Anthropometrics    Temp: 98.3 °F (36.8 °C)  Height Method: Stated  Height: 5' 11" (180.3 cm)  Height (inches): 71 in  Weight Method: Bed Scale  Weight: 84.7 kg (186 lb 11.7 oz)  Weight (lb): 186.73 lb  Ideal Body Weight (IBW), Male: 172 lb  BMI (Calculated): 26.1     Lab/Procedures/Meds    Pertinent Labs Reviewed: reviewed  Pertinent Labs Comments: glucose 142, BUN 53, Cr 7.4, albumin 3.0, eGFR 7.8  Pertinent Medications Reviewed: " reviewed  Pertinent Medications Comments: insulin, prednisone, tacrolimus, famotidine, vit D, folic acid, atorvastatin, heparin, calcium-vit D3    Estimated/Assessed Needs    Weight Used For Calorie Calculations: 84.4 kg (186 lb)  Energy Calorie Requirements (kcal): 2093 kcal  Energy Need Method: [place]  Protein Requirements: 85-109g (1-1.2g/kg)  Weight Used For Protein Calculations: 84.4 kg (186 lb)  Fluid Requirements (mL): 1ml/kcal or per MD  Estimated Fluid Requirement Method: RDA Method  RDA Method (mL): 2093  CHO Requirement: 262 g/day    Evaluation of Received Nutrient/Fluid Intake    I/O: - 8.5 L since admit  Energy Calories Required: meeting needs  Protein Required: meeting needs  Comments: LBM 8/2  Tolerance: tolerating  % Intake of Estimated Energy Needs: 75 - 100 %  % Meal Intake: 75 - 100 %    Nutrition Risk    Level of Risk/Frequency of Follow-up:  (1x/week)     Monitor and Evaluation    Food and Nutrient Intake: energy intake, food and beverage intake  Food and Nutrient Adminstration: diet order  Knowledge/Beliefs/Attitudes: food and nutrition knowledge/skill  Physical Activity and Function: nutrition-related ADLs and IADLs  Anthropometric Measurements: height/length, weight, weight change, body mass index  Biochemical Data, Medical Tests and Procedures: electrolyte and renal panel, gastrointestinal profile, glucose/endocrine profile, inflammatory profile, lipid profile  Nutrition-Focused Physical Findings: overall appearance     Nutrition Follow-Up    RD Follow-up?: Yes

## 2023-08-04 NOTE — ASSESSMENT & PLAN NOTE
BG goal: 140-180   T2DM.  Hx of lung transplant w/ CLAD.     - Continue Levemir 8 units q HS   - Continue Novolog 4 units AC  - Continue MDC  - POCT Glucose before meals and at bedtime  - Hypoglycemia protocol in place      ** Please notify Endocrine for any change and/or advance in diet**  ** Please call Endocrine for any BG related issues **     Discharge Planning:   TBD. Please notify endocrinology prior to discharge.

## 2023-08-04 NOTE — PROGRESS NOTES
[facility] - Transplant Stepdown  Lung Transplant  Progress Note - Floor    Patient Name: Martin Hendrix Jr.  MRN: [id number]  Admission Date: 7/25/2023  Hospital Length of Stay: 10 days  Post-Operative Day: 2173  Attending Physician: Robina Mcdonald MD  Primary Care Provider: Michel Henley MD     Subjective:     Interval History: No acute events overnight.  Working with PT/OT.  Awaiting SNF placement    Continuous Infusions:  Scheduled Meds:   aspirin  81 mg Oral Daily    atorvastatin  20 mg Oral Daily    calcium-vitamin D3  1 tablet Oral BID    cinacalcet  30 mg Oral Daily with breakfast    epoetin greer (PROCRIT) injection  2,000 Units Intravenous Every Tues, Thurs, Sat    famotidine  20 mg Oral Daily    folic acid  1,000 mcg Oral Daily    heparin (porcine)  5,000 Units Subcutaneous Q8H    insulin aspart U-100  4 Units Subcutaneous TIDWM    insulin detemir U-100  8 Units Subcutaneous QHS    magnesium chloride  128 mg Oral BID    predniSONE  5 mg Oral Daily    sulfamethoxazole-trimethoprim 400-80mg  1 tablet Oral Every Mon, Wed, Fri    tacrolimus  1.5 mg Oral Daily PM    tacrolimus  2 mg Oral Daily AM    vitamin D  2,000 Units Oral Daily     PRN Meds:acetaminophen, glucagon (human recombinant), glucose, glucose, insulin aspart U-100, morphine, ondansetron, sodium chloride 0.9%    Review of patient's allergies indicates:  No Known Allergies    Review of Systems   Constitutional:  Positive for activity change. Negative for fatigue.   HENT: Negative.     Respiratory:  Positive for shortness of breath. Negative for cough, chest tightness and wheezing.    Gastrointestinal:  Negative for diarrhea, nausea and vomiting.   Endocrine: Negative.    Genitourinary:  Positive for decreased urine volume.   Allergic/Immunologic: Positive for immunocompromised state.   Neurological:  Positive for weakness (due to deconditioning).   Psychiatric/Behavioral:  Negative for confusion. The patient is nervous/anxious (at times).       Objective:        Physical Exam  Vitals reviewed.   Constitutional:       General: He is awake.      Appearance: He is well-developed.      Interventions: Nasal cannula in place.   HENT:      Head: Normocephalic and atraumatic.   Eyes:      Extraocular Movements: Extraocular movements intact.      Conjunctiva/sclera: Conjunctivae normal.   Cardiovascular:      Rate and Rhythm: Normal rate and regular rhythm.      Heart sounds: Normal heart sounds.   Pulmonary:      Effort: No tachypnea, accessory muscle usage or respiratory distress.      Breath sounds: Decreased breath sounds present. No wheezing or rhonchi.   Abdominal:      General: Bowel sounds are normal.      Palpations: Abdomen is soft.      Tenderness: There is no abdominal tenderness.   Musculoskeletal:         General: Normal range of motion.   Skin:     General: Skin is warm and dry.   Neurological:      Mental Status: He is alert and oriented to person, place, and time.   Psychiatric:         Mood and Affect: Mood normal.         Speech: Speech normal.         Behavior: Behavior is cooperative.         Thought Content: Thought content normal.         Cognition and Memory: Cognition and memory normal.         Judgment: Judgment normal.                Vital Signs (Most Recent):  Temp: 98 °F (36.7 °C) (08/04/23 0800)  Pulse: 99 (08/04/23 1000)  Resp: 17 (08/04/23 0800)  BP: (!) 142/68 (08/04/23 0800)  SpO2: 100 % (08/04/23 1000) Vital Signs (24h Range):  Temp:  [98 °F (36.7 °C)-99 °F (37.2 °C)] 98 °F (36.7 °C)  Pulse:  [] 99  Resp:  [17-22] 17  SpO2:  [98 %-100 %] 100 %  BP: (123-151)/(68-80) 142/68     Weight: 84.7 kg (186 lb 11.7 oz)  Body mass index is 26.04 kg/m².      Intake/Output Summary (Last 24 hours) at 8/4/2023 1051  Last data filed at 8/3/2023 1100  Gross per 24 hour   Intake 300 ml   Output 3100 ml   Net -2800 ml       Significant Labs:  CBC:  Recent Labs   Lab 08/04/23  0641   WBC 10.15   RBC 3.15*   HGB 9.4*   HCT 31.1*       MCV 99*   MCH 29.8   MCHC 30.2*     BMP:  Recent Labs   Lab 08/04/23  0641      K 4.2      CO2 24   BUN 53*   CREATININE 7.4*   CALCIUM 8.6*      Tacrolimus Levels:  Recent Labs   Lab 08/04/23  0641   TACROLIMUS 3.4*     Microbiology:  Microbiology Results (last 7 days)       ** No results found for the last 168 hours. **            I have reviewed all pertinent labs within the past 24 hours.    Diagnostic Results:  Labs: Reviewed        Assessment/Plan:     * Acute on chronic respiratory failure with hypoxia  Patient with Hypercapnic and Hypoxic Respiratory failure which is Acute on chronic.  he is on home oxygen at 2 LPM. Supplemental oxygen was provided and noted- Oxygen Concentration 2L NC  .   Signs/symptoms of respiratory failure include- tachypnea, increased work of breathing, respiratory distress and use of accessory muscles. Contributing diagnoses includes - Pleural effusion, Pneumonia and complications of lung transplant Labs and images were reviewed. Patient Has recent ABG, which has been reviewed. Will treat underlying causes and adjust management of respiratory failure as follows-     CXR with noted right sided pleural effusion.  Bedside US showed small amount of fluid noted to right side.     --antibiotics stopped  --Continue Bi-PAP at night, currently weaning down oxygen  --RIP negative, aspergillus antigen and CMV PCR negative  --Nephrology following.    --SNF consult placed  --No increase in oxygen requirements based on walk in horn    Complication of lung transplant  Patient with known CLAD/JONELLE.  Dyspnea has been worsening over the last several months. Patient is a DNR.    Lung replaced by transplant  Underwent BLT on 8/22/2017 for pulmonary hypertension.  Now with CLAD.  Continue OIP and immunosuppression     Immunosuppression  Continue tacrolimus and prednisone.  Will monitor tacrolimus levels while inpatient    Prophylactic antibiotic  Continue Bactrim    ESRD (end stage renal  disease) on dialysis  Per Nephrology.  Patient on T,TH,Sat schedule    Type 2 diabetes mellitus with hyperglycemia, with long-term current use of insulin  Endocrine consult.  Appreciate recs    Palliative care encounter  Patient is DNR.  Palliative Care following        Gayle S SAUNDRA Celestin  Lung Transplant  [facility] - Transplant Stepdown

## 2023-08-04 NOTE — PT/OT/SLP PROGRESS
Occupational Therapy   Treatment    Name: Martin Hendrix Jr.  MRN: [id number]  Admitting Diagnosis:  Acute on chronic respiratory failure with hypoxia       Recommendations:     Discharge Recommendations: other (see comments)  Discharge Equipment Recommendations:  walker, rolling  Barriers to discharge:  Other (Comment)    Assessment:     Martin Hendrix Jr. is a 62 y.o. male with a medical diagnosis of Acute on chronic respiratory failure with hypoxia. Performance deficits affecting function are weakness, impaired endurance, impaired self care skills, impaired functional mobility, gait instability, impaired balance, impaired cardiopulmonary response to activity. Patient tolerated treatment session well and was motivated to participate in therapy. Patient would benefit from continued skilled acute OT 3x/wk to improve functional mobility, increase independence with ADLs, and address established goals. Recommending post acute therapy once medically appropriate for discharge to increase maximal independence, reduce burden of care, and ensure safety.     Rehab Prognosis:  Good; patient would benefit from acute skilled OT services to address these deficits and reach maximum level of function.       Plan:     Patient to be seen 3 x/week to address the above listed problems via self-care/home management, therapeutic activities, therapeutic exercises  Plan of Care Expires: 08/28/23  Plan of Care Reviewed with: patient    Subjective     Chief Complaint: none noted  Patient/Family Comments/goals: patient agreed to therapy  Pain/Comfort:  Pain Rating 1: 0/10  Pain Rating Post-Intervention 1: 0/10    Objective:     Communicated with: MEREDITH prior to session.  Patient found up in chair with telemetry, pulse ox (continuous), peripheral IV, oxygen upon OT entry to room.    General Precautions: Standard, fall    Orthopedic Precautions:N/A  Braces: N/A  Respiratory Status: Room air     Occupational Performance:     Functional  Mobility/Transfers:  Patient completed Sit <> Stand Transfer with stand by assistance  with  no assistive device   Functional Mobility: Patient ambulated bed<>sink with no AD and with SBA    Activities of Daily Living:  Grooming: supervision standing at sink for oral care, washing face, and combing hair  Lower Body Dressing: supervision donning socks      [facility] 6 Click ADL: 19    Treatment & Education:  Role of OT and POC  ADL retraining  Functional mobility training  Safety  Importance EOB/OOB activity    Patient left up in chair with all lines intact, call button in reach, and all met.      GOALS:   Multidisciplinary Problems       Occupational Therapy Goals          Problem: Occupational Therapy    Goal Priority Disciplines Outcome Interventions   Occupational Therapy Goal     OT, PT/OT Ongoing, Progressing    Description: Goals to be met by: 8/11/23    Patient will increase functional independence with ADLs by performing:    UE Dressing with Supervision.  LE Dressing with Supervision.  Grooming while standing at sink with Supervision.  Toileting from toilet with Supervision for hygiene and clothing management.   Supine to sit with Dickey.  Step transfer with Supervision.                         Time Tracking:     OT Date of Treatment: 08/04/23  OT Start Time: 1438  OT Stop Time: 1451  OT Total Time (min): 13 min    Billable Minutes:Self Care/Home Management 13               8/4/2023

## 2023-08-04 NOTE — PLAN OF CARE
Pt AAOx4. VSS. No complaints of pain/distress. SpO2 >90% on 2L NC. NSR on Tele. , SSI administered per orders. NSR on tele. Pending SNF placement. Bed in lowest locked position, call light in reach, no falls, WCTM.   0 = swallows foods/liquids without difficulty

## 2023-08-04 NOTE — SUBJECTIVE & OBJECTIVE
Subjective:     Interval History: No acute events overnight.  Working with PT/OT.  Awaiting SNF placement    Continuous Infusions:  Scheduled Meds:   aspirin  81 mg Oral Daily    atorvastatin  20 mg Oral Daily    calcium-vitamin D3  1 tablet Oral BID    cinacalcet  30 mg Oral Daily with breakfast    epoetin greer (PROCRIT) injection  2,000 Units Intravenous Every Tues, Thurs, Sat    famotidine  20 mg Oral Daily    folic acid  1,000 mcg Oral Daily    heparin (porcine)  5,000 Units Subcutaneous Q8H    insulin aspart U-100  4 Units Subcutaneous TIDWM    insulin detemir U-100  8 Units Subcutaneous QHS    magnesium chloride  128 mg Oral BID    predniSONE  5 mg Oral Daily    sulfamethoxazole-trimethoprim 400-80mg  1 tablet Oral Every Mon, Wed, Fri    tacrolimus  1.5 mg Oral Daily PM    tacrolimus  2 mg Oral Daily AM    vitamin D  2,000 Units Oral Daily     PRN Meds:acetaminophen, glucagon (human recombinant), glucose, glucose, insulin aspart U-100, morphine, ondansetron, sodium chloride 0.9%    Review of patient's allergies indicates:  No Known Allergies    Review of Systems   Constitutional:  Positive for activity change. Negative for fatigue.   HENT: Negative.     Respiratory:  Positive for shortness of breath. Negative for cough, chest tightness and wheezing.    Gastrointestinal:  Negative for diarrhea, nausea and vomiting.   Endocrine: Negative.    Genitourinary:  Positive for decreased urine volume.   Allergic/Immunologic: Positive for immunocompromised state.   Neurological:  Positive for weakness (due to deconditioning).   Psychiatric/Behavioral:  Negative for confusion. The patient is nervous/anxious (at times).      Objective:        Physical Exam  Vitals reviewed.   Constitutional:       General: He is awake.      Appearance: He is well-developed.      Interventions: Nasal cannula in place.   HENT:      Head: Normocephalic and atraumatic.   Eyes:      Extraocular Movements: Extraocular movements intact.       Conjunctiva/sclera: Conjunctivae normal.   Cardiovascular:      Rate and Rhythm: Normal rate and regular rhythm.      Heart sounds: Normal heart sounds.   Pulmonary:      Effort: No tachypnea, accessory muscle usage or respiratory distress.      Breath sounds: Decreased breath sounds present. No wheezing or rhonchi.   Abdominal:      General: Bowel sounds are normal.      Palpations: Abdomen is soft.      Tenderness: There is no abdominal tenderness.   Musculoskeletal:         General: Normal range of motion.   Skin:     General: Skin is warm and dry.   Neurological:      Mental Status: He is alert and oriented to person, place, and time.   Psychiatric:         Mood and Affect: Mood normal.         Speech: Speech normal.         Behavior: Behavior is cooperative.         Thought Content: Thought content normal.         Cognition and Memory: Cognition and memory normal.         Judgment: Judgment normal.                Vital Signs (Most Recent):  Temp: 98 °F (36.7 °C) (08/04/23 0800)  Pulse: 99 (08/04/23 1000)  Resp: 17 (08/04/23 0800)  BP: (!) 142/68 (08/04/23 0800)  SpO2: 100 % (08/04/23 1000) Vital Signs (24h Range):  Temp:  [98 °F (36.7 °C)-99 °F (37.2 °C)] 98 °F (36.7 °C)  Pulse:  [] 99  Resp:  [17-22] 17  SpO2:  [98 %-100 %] 100 %  BP: (123-151)/(68-80) 142/68     Weight: 84.7 kg (186 lb 11.7 oz)  Body mass index is 26.04 kg/m².      Intake/Output Summary (Last 24 hours) at 8/4/2023 1051  Last data filed at 8/3/2023 1100  Gross per 24 hour   Intake 300 ml   Output 3100 ml   Net -2800 ml       Significant Labs:  CBC:  Recent Labs   Lab 08/04/23  0641   WBC 10.15   RBC 3.15*   HGB 9.4*   HCT 31.1*      MCV 99*   MCH 29.8   MCHC 30.2*     BMP:  Recent Labs   Lab 08/04/23 0641      K 4.2      CO2 24   BUN 53*   CREATININE 7.4*   CALCIUM 8.6*      Tacrolimus Levels:  Recent Labs   Lab 08/04/23  0641   TACROLIMUS 3.4*     Microbiology:  Microbiology Results (last 7 days)       ** No results  found for the last 168 hours. **            I have reviewed all pertinent labs within the past 24 hours.    Diagnostic Results:  Labs: Reviewed

## 2023-08-04 NOTE — PLAN OF CARE
Recommendations    1. Continue Diabetic diet - rec'd add renal restriction.      2. If PO intake <50%, add Novasource renal BID      3. RD to monitor and follow    Goals: Meet % EEN, EPN by RD f/u  Nutrition Goal Status: new  Communication of RD Recs:  (POC)

## 2023-08-04 NOTE — PLAN OF CARE
CHW met with patient/family at bedside. Patient experience rounding completed and reviewed the following.     Do you know your discharge plan? Yes        If yes, what is the plan? Home    Have you discussed your needs and preferences with your SW/CM? Yes     If you are discharging home, do you have help at home? Yes     Do you think you will need help additional at home at discharge? No     Do you currently have difficulty keeping up with bills, affording medicine or buying food? No    Assigned SW/CM notified of any patient/family needs or concerns. Appropriate resources provided to address patient's needs.

## 2023-08-04 NOTE — SUBJECTIVE & OBJECTIVE
"Interval HPI:   Overnight events: NAEON. Awaiting SNF placement. BG stable on current SQ insulin regimen. Creatinine 7.4. Diet diabetic [facility];  Calorie    Eatin%  Nausea: No  Hypoglycemia and intervention: No  Fever: No  TPN and/or TF: No  If yes, type of TF/TPN and rate: n/a    /64   Pulse 93   Temp 98.3 °F (36.8 °C)   Resp 20   Ht 5' 11" (1.803 m)   Wt 84.7 kg (186 lb 11.7 oz)   SpO2 100%   BMI 26.04 kg/m²     Labs Reviewed and Include    Recent Labs   Lab 23  0641   *   CALCIUM 8.6*   ALBUMIN 3.0*   PROT 6.5      K 4.2   CO2 24      BUN 53*   CREATININE 7.4*   ALKPHOS 100   ALT 17   AST 23   BILITOT 0.4     Lab Results   Component Value Date    WBC 10.15 2023    HGB 9.4 (L) 2023    HCT 31.1 (L) 2023    MCV 99 (H) 2023     2023     No results for input(s): "TSH", "FREET4" in the last 168 hours.  Lab Results   Component Value Date    HGBA1C 5.6 2023       Nutritional status:   Body mass index is 26.04 kg/m².  Lab Results   Component Value Date    ALBUMIN 3.0 (L) 2023    ALBUMIN 3.2 (L) 2023    ALBUMIN 3.1 (L) 2023     No results found for: "PREALBUMIN"    Estimated Creatinine Clearance: 11.3 mL/min (A) (based on SCr of 7.4 mg/dL (H)).    Accu-Checks  Recent Labs     23  1238 23  1720 23  20223  1235 23  1652 23  20523  1215 23  1744 23  2214   POCTGLUCOSE 152* 215* 147* 165* 192* 250* 179* 211* 206*       Current Medications and/or Treatments Impacting Glycemic Control  Immunotherapy:    Immunosuppressants           Stop Route Frequency     tacrolimus capsule 1.5 mg         -- Oral Every evening     tacrolimus capsule 2 mg         -- Oral Every morning          Steroids:   Hormones (From admission, onward)      Start     Stop Route Frequency Ordered    23 1345  predniSONE tablet 5 mg         -- Oral Daily 23 1240      "     Pressors:    Autonomic Drugs (From admission, onward)      None          Hyperglycemia/Diabetes Medications:   Antihyperglycemics (From admission, onward)      Start     Stop Route Frequency Ordered    07/30/23 2100  insulin detemir U-100 (Levemir) pen 8 Units         -- SubQ Nightly 07/30/23 0819    07/26/23 1645  insulin aspart U-100 pen 4 Units         -- SubQ 3 times daily with meals 07/26/23 1610    07/25/23 1840  insulin aspart U-100 pen 1-10 Units         -- SubQ Before meals & nightly PRN 07/25/23 5093

## 2023-08-04 NOTE — PLAN OF CARE
Patient is AAOx3. PT/Ot are working with the patient. Patient is 100% on 3L O2 per nasal cannula. Patient is NSR on telemetry. Monitoring the patient's blood sugar AC&HS. patient has been sitting up in the chair all day today. Reminded the patient to call for assistance.

## 2023-08-04 NOTE — PT/OT/SLP PROGRESS
Physical Therapy Treatment    Patient Name:  Martin Hendrix Jr.   MRN:  [id number]  Admit Date: 2023  Admitting Diagnosis:  Acute on chronic respiratory failure with hypoxia   Length of Stay: 10 days  Recent Surgery: * No surgery found *      Recommendations:     Discharge Recommendations:  other (see comments) (defer to CM/SW)   Discharge Equipment Recommendations: walker, rolling   Barriers to discharge: None    Plan:     During this hospitalization, patient to be seen 3 x/week to address the listed problems via gait training, therapeutic activities, therapeutic exercises, neuromuscular re-education  Plan of Care Expires:  23  Plan of Care Reviewed with: patient    Assessment:     Martin Hendrix Jr. is a 62 y.o. male admitted with a medical diagnosis of Acute on chronic respiratory failure with hypoxia. Pt found alert and cooperative. VSS throughout session. Pt progressing towards goals, but not at PLOF. Pt tolerated session well with SpO2 remaining greater than 90% with all activity on 3L O2.  Pt is improving with therapy evidenced by increased gait distance and improved gait stability (able to use no AD). Pt would benefit from continued PT services to improve overall functional mobility.        Problem List: weakness, impaired endurance, impaired functional mobility, gait instability, impaired balance, impaired cardiopulmonary response to activity.  Rehab Prognosis: Good     GOALS:   Multidisciplinary Problems       Physical Therapy Goals          Problem: Physical Therapy    Goal Priority Disciplines Outcome Goal Variances Interventions   Physical Therapy Goal     PT, PT/OT Ongoing, Progressing     Description: Goals to be met by: 8/15/2023    Patient will increase functional independence with mobility by performin. Supine to sit with Supervision - met   2. Sit to stand transfer with Supervision using LRAD  3. Gait  x 50 feet with Supervision using LRAD.                          Subjective  "  Communicated with RN prior to session.  Patient found HOB elevated upon PT entry to room, agreeable to evaluation. Martin MANZO Simeon Bowen.'s alone during session.    Chief Complaint: none reported   Patient/Family Comments/goals: to get better and return home   Pain/Comfort:  Pain Rating 1: 0/10  Pain Rating Post-Intervention 1: 0/10    Objective:   Patient found with: telemetry, pulse ox (continuous), peripheral IV, oxygen   General Precautions: Standard, Cardiac fall   Orthopedic Precautions:N/A   Braces: N/A   Oxygen Device: Nasal Cannula  Vitals: BP (!) 140/72   Pulse 101   Temp 98 °F (36.7 °C)   Resp 17   Ht 5' 11" (1.803 m)   Wt 84.7 kg (186 lb 11.7 oz)   SpO2 100%   BMI 26.04 kg/m²     Outcome Measures:  AM-PAC 6 CLICK MOBILITY  Turning over in bed (including adjusting bedclothes, sheets and blankets)?: 3  Sitting down on and standing up from a chair with arms (e.g., wheelchair, bedside commode, etc.): 3  Moving from lying on back to sitting on the side of the bed?: 3  Moving to and from a bed to a chair (including a wheelchair)?: 3  Need to walk in hospital room?: 3  Climbing 3-5 steps with a railing?: 3  Basic Mobility Total Score: 18       Functional Mobility:  Additional staff present: N/A  Bed Mobility:   Supine to Sit: supervision; HOB elevated  Scooting anteriorly to EOB to have both feet planted on floor: supervision      Sitting Balance at Edge of Bed:  Assistance Level Required: Supervision  Time: 2 minutes  Comments:   Worked on activity tolerance sitting EOB and worked on tolerance to positional change     Transfers:   Sit <> Stand Transfer: stand by assistance with no assistive device from EOB      Gait:  Patient ambulated: 30ft + 70ft + 80ft (standing rest breaks between trials)   Patient required:  SBA-CGA  Patient used: no assistive device - occasional touchpoints in hallway   Gait Pattern observed: reciprocal gait  Gait Deviation(s): occasional unsteady gait, narrow base of support, " decreased bernadette, and occasional inconsistent foot placement  Impairments due to: impaired balance, decreased strength, and decreased endurance  Comments:   Pt with 3 minor LOBs that he could correct by using touchpoints  Pt demo'd increased instability with turning and multitasking  Mild SOB - SpO2> 90%  Verbal cuing for pacing, deep breathing, purposeful steps, and to widen IMTIAZ      Therapeutic Activities, Exercises, and Education:   Educated pt on PT role/POC  Educated pt on importance of OOB activity and daily ambulation   Pt verbalized understanding     T/f to chair to increase tolerance to OOB activity and to create optimal positioning for lung expansion     Patient left up in chair with all lines intact, call button in reach, and RN notified..    Time Tracking:     PT Received On: 08/04/23  PT Start Time: 0935     PT Stop Time: 0951  PT Total Time (min): 16 min       Billable Minutes:   Gait Training 12    Treatment Type: Treatment  PT/PTA: PT

## 2023-08-04 NOTE — PROGRESS NOTES
"[facility]  Endocrinology  Progress Note    Admit Date: 2023     RReason for Consult: Management of T2DM, Hyperglycemia     Surgical Procedure and Date: Double lung transplant in 2017      Diabetes diagnosis year:      Home Diabetes Medications:  Tresiba 20 unit HS & Humalog 10u AC  (per med rec)     How often checking glucose at home? CAROLA  BG readings on regimen: CAROLA  Hypoglycemia on the regimen?  No  Missed doses on regimen?  CAROLA     Diabetes Complications include:     Hyperglycemia and Diabetic peripheral neuropathy      Complicating diabetes co morbidities:   KRISTYN and Glucocorticoid use       HPI:   Patient is a 62 y.o. 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 He has felt short of breath for the last week and said he has been going to his dialysis as scheduled. He denied fever, chills, cough, N/V/D. He has been compliant with his anti-rejection meds. Found to have a multifocal pneumonia on CXR; Covid and Trop negative. While awaiting transfer to [facility] for transplant care- his respiratory status worsened requiring continuous bipap. Arrived at [facility] MICU on . He received HD at [place] the morning of his transfer.   Endocrine consulted for bg management.          Interval HPI:   Overnight events: NAEON. Awaiting SNF placement. BG stable on current SQ insulin regimen. Creatinine 7.4. Diet diabetic [facility];  Calorie    Eatin%  Nausea: No  Hypoglycemia and intervention: No  Fever: No  TPN and/or TF: No  If yes, type of TF/TPN and rate: n/a    /64   Pulse 93   Temp 98.3 °F (36.8 °C)   Resp 20   Ht 5' 11" (1.803 m)   Wt 84.7 kg (186 lb 11.7 oz)   SpO2 100%   BMI 26.04 kg/m²     Labs Reviewed and Include    Recent Labs   Lab 23  0641   *   CALCIUM 8.6*   ALBUMIN 3.0*   PROT 6.5      K 4.2   CO2 24      BUN 53* " "  CREATININE 7.4*   ALKPHOS 100   ALT 17   AST 23   BILITOT 0.4     Lab Results   Component Value Date    WBC 10.15 08/04/2023    HGB 9.4 (L) 08/04/2023    HCT 31.1 (L) 08/04/2023    MCV 99 (H) 08/04/2023     08/04/2023     No results for input(s): "TSH", "FREET4" in the last 168 hours.  Lab Results   Component Value Date    HGBA1C 5.6 07/25/2023       Nutritional status:   Body mass index is 26.04 kg/m².  Lab Results   Component Value Date    ALBUMIN 3.0 (L) 08/04/2023    ALBUMIN 3.2 (L) 08/03/2023    ALBUMIN 3.1 (L) 08/02/2023     No results found for: "PREALBUMIN"    Estimated Creatinine Clearance: 11.3 mL/min (A) (based on SCr of 7.4 mg/dL (H)).    Accu-Checks  Recent Labs     08/01/23  1238 08/01/23  1720 08/01/23  2027 08/02/23  1235 08/02/23  1652 08/02/23  2051 08/03/23  1215 08/03/23  1744 08/03/23  2214   POCTGLUCOSE 152* 215* 147* 165* 192* 250* 179* 211* 206*       Current Medications and/or Treatments Impacting Glycemic Control  Immunotherapy:    Immunosuppressants           Stop Route Frequency     tacrolimus capsule 1.5 mg         -- Oral Every evening     tacrolimus capsule 2 mg         -- Oral Every morning          Steroids:   Hormones (From admission, onward)      Start     Stop Route Frequency Ordered    07/28/23 1345  predniSONE tablet 5 mg         -- Oral Daily 07/28/23 1240          Pressors:    Autonomic Drugs (From admission, onward)      None          Hyperglycemia/Diabetes Medications:   Antihyperglycemics (From admission, onward)      Start     Stop Route Frequency Ordered    07/30/23 2100  insulin detemir U-100 (Levemir) pen 8 Units         -- SubQ Nightly 07/30/23 0819    07/26/23 1645  insulin aspart U-100 pen 4 Units         -- SubQ 3 times daily with meals 07/26/23 1610    07/25/23 1840  insulin aspart U-100 pen 1-10 Units         -- SubQ Before meals & nightly PRN 07/25/23 5310            ASSESSMENT and PLAN    Pulmonary  * Acute on chronic respiratory failure with " hypoxia  Managed per primary team  Optimize bg control      Immunology/Multi System  Immunosuppression  On immunosuppressive therapy per transplant team; may elevate BG readings        Endocrine  Type 2 diabetes mellitus with hyperglycemia, with long-term current use of insulin  BG goal: 140-180   T2DM.  Hx of lung transplant w/ CLAD.     - Continue Levemir 8 units q HS   - Continue Novolog 4 units AC  - Continue MDC  - POCT Glucose before meals and at bedtime  - Hypoglycemia protocol in place      ** Please notify Endocrine for any change and/or advance in diet**  ** Please call Endocrine for any BG related issues **     Discharge Planning:   TBD. Please notify endocrinology prior to discharge.            Izabela Caputo, NP  Endocrinology  [facility]

## 2023-08-04 NOTE — PLAN OF CARE
Problem: Physical Therapy  Goal: Physical Therapy Goal  Description: Goals to be met by: 8/15/2023    Patient will increase functional independence with mobility by performin. Supine to sit with Supervision - met   2. Sit to stand transfer with Supervision using LRAD  3. Gait  x 50 feet with Supervision using LRAD.     Outcome: Ongoing, Progressing     Treatment completed. Some goals met.

## 2023-08-05 NOTE — PROGRESS NOTES
Pt arrived to unit via w/c, AAOx4 with O2@3L/NC in place. HD initiated via left forearm AVF, tolerated well, flows good. UF goal 3L as tolerated

## 2023-08-05 NOTE — PROGRESS NOTES
HD tx complete. 3 L removed over 3.5 hours, tolerated well. Blood returned, needles pulled, pressure held to each site for 10 minutes until hemostasis achieved. Gauze and tape applied. Report given to primary nurse Liu. Transferred from unit via w/c by transport back to  [id number]. VSS

## 2023-08-05 NOTE — ASSESSMENT & PLAN NOTE
Underwent BLT on 8/22/2017 complicated by CLAD with chronic hypoxemic respiratory failure.  He was admitted to acute on chronic hypoxemic/hypercarbic respiratory failure due to fluid overload with pneumonia.  Improving to baseline.   Continue OIP and immunosuppression

## 2023-08-05 NOTE — SUBJECTIVE & OBJECTIVE
Subjective:     Interval History: No significant events overnight.  Gradually improving to baseline.  HD today.  Awaiting SNF placement    Continuous Infusions:  Scheduled Meds:   aspirin  81 mg Oral Daily    atorvastatin  20 mg Oral Daily    calcium-vitamin D3  1 tablet Oral BID    cinacalcet  30 mg Oral Daily with breakfast    epoetin greer (PROCRIT) injection  2,000 Units Intravenous Every Tues, Thurs, Sat    famotidine  20 mg Oral Daily    folic acid  1,000 mcg Oral Daily    heparin (porcine)  5,000 Units Subcutaneous Q8H    insulin aspart U-100  4 Units Subcutaneous TIDWM    insulin detemir U-100  8 Units Subcutaneous QHS    magnesium chloride  128 mg Oral BID    predniSONE  5 mg Oral Daily    sulfamethoxazole-trimethoprim 400-80mg  1 tablet Oral Every Mon, Wed, Fri    tacrolimus  1.5 mg Oral Daily PM    tacrolimus  2 mg Oral Daily AM    vitamin D  2,000 Units Oral Daily     PRN Meds:acetaminophen, glucagon (human recombinant), glucose, glucose, insulin aspart U-100, morphine, ondansetron, sodium chloride 0.9%    Review of patient's allergies indicates:  No Known Allergies    Review of Systems   Constitutional:  Positive for activity change. Negative for fatigue.   HENT: Negative.     Respiratory:  Positive for shortness of breath. Negative for cough, chest tightness and wheezing.    Gastrointestinal:  Negative for diarrhea, nausea and vomiting.   Endocrine: Negative.    Genitourinary:  Positive for decreased urine volume.   Allergic/Immunologic: Positive for immunocompromised state.   Neurological:  Positive for weakness (due to deconditioning).   Psychiatric/Behavioral:  Negative for confusion. The patient is nervous/anxious (at times).      Objective:        Physical Exam  Vitals reviewed.   Constitutional:       General: He is awake.      Appearance: He is well-developed.      Interventions: Nasal cannula in place.   HENT:      Head: Normocephalic and atraumatic.   Eyes:      Extraocular Movements:  Extraocular movements intact.      Conjunctiva/sclera: Conjunctivae normal.   Cardiovascular:      Rate and Rhythm: Normal rate and regular rhythm.      Heart sounds: Normal heart sounds.   Pulmonary:      Effort: No tachypnea, accessory muscle usage or respiratory distress.      Breath sounds: Decreased breath sounds present. No wheezing or rhonchi.   Abdominal:      General: Bowel sounds are normal.      Palpations: Abdomen is soft.      Tenderness: There is no abdominal tenderness.   Musculoskeletal:         General: Normal range of motion.   Skin:     General: Skin is warm and dry.   Neurological:      Mental Status: He is alert and oriented to person, place, and time.   Psychiatric:         Mood and Affect: Mood normal.         Speech: Speech normal.         Behavior: Behavior is cooperative.         Thought Content: Thought content normal.         Cognition and Memory: Cognition and memory normal.         Judgment: Judgment normal.                Vital Signs (Most Recent):  Temp: 98.4 °F (36.9 °C) (08/05/23 1227)  Pulse: (!) 111 (08/05/23 1227)  Resp: 20 (08/05/23 1227)  BP: 139/79 (08/05/23 1227)  SpO2: 100 % (08/05/23 1227) Vital Signs (24h Range):  Temp:  [97.8 °F (36.6 °C)-98.4 °F (36.9 °C)] 98.4 °F (36.9 °C)  Pulse:  [] 111  Resp:  [16-20] 20  SpO2:  [97 %-100 %] 100 %  BP: (123-178)/(67-94) 139/79     Weight: 82.9 kg (182 lb 12.2 oz)  Body mass index is 25.49 kg/m².      Intake/Output Summary (Last 24 hours) at 8/5/2023 1244  Last data filed at 8/5/2023 1150  Gross per 24 hour   Intake 900 ml   Output 3650 ml   Net -2750 ml         Significant Labs:  CBC:  Recent Labs   Lab 08/05/23 0626   WBC 10.43   RBC 3.03*   HGB 9.1*   HCT 29.1*      MCV 96   MCH 30.0   MCHC 31.3*       BMP:  Recent Labs   Lab 08/05/23 0626      K 4.4      CO2 21*   BUN 70*   CREATININE 9.0*   CALCIUM 9.2        Tacrolimus Levels:  Recent Labs   Lab 08/05/23 0626   TACROLIMUS 3.3*        Microbiology:  Microbiology Results (last 7 days)       ** No results found for the last 168 hours. **            I have reviewed all pertinent labs within the past 24 hours.    Diagnostic Results:  Labs: Reviewed

## 2023-08-05 NOTE — PROGRESS NOTES
"[facility]  Endocrinology  Progress Note    Admit Date: 2023     RReason for Consult: Management of T2DM, Hyperglycemia     Surgical Procedure and Date: Double lung transplant in 2017      Diabetes diagnosis year:      Home Diabetes Medications:  Tresiba 20 unit HS & Humalog 10u AC  (per med rec)     How often checking glucose at home? CAROLA  BG readings on regimen: CAROLA  Hypoglycemia on the regimen?  No  Missed doses on regimen?  CAROLA     Diabetes Complications include:     Hyperglycemia and Diabetic peripheral neuropathy      Complicating diabetes co morbidities:   KRISTYN and Glucocorticoid use       HPI:   Patient is a 62 y.o. male with PMHx of ESRD, CHF, HTN, HLD, bilateral lung transplant in  2/2 sarcoidosis and pulmonary htn who presented to OSH on  for shortness of breath. He has felt short of breath for the last week and said he has been going to his dialysis as scheduled. He denied fever, chills, cough, N/V/D. He has been compliant with his anti-rejection meds. Found to have a multifocal pneumonia on CXR; Covid and Trop negative. [place] [facility]- his respiratory status worsened requiring continuous bipap. [place] [facility] [place] [place] . He received HD at [place] the morning of his transfer.   Endocrine consulted for bg management.          Interval HPI:   Overnight events: s/p lung transplant. Remains in TSU; awaiting SNF placement. BG stable on current regimen. Creatinine 9.0; HD today. Diet diabetic [facility];  Calorie    Eatin%  Nausea: No  Hypoglycemia and intervention: No  Fever: No  TPN and/or TF: No  If yes, type of TF/TPN and rate: n/a    BP (!) 163/87   Pulse 94   Temp 97.8 °F (36.6 °C) (Oral)   Resp 20   Ht 5' 11" (1.803 m)   Wt 82.9 kg (182 lb 12.2 oz)   SpO2 99%   BMI 25.49 kg/m²     Labs Reviewed and Include    Recent Labs   Lab 23  0626   *   CALCIUM 9.2   ALBUMIN 3.1*   PROT 6.4    " "  K 4.4   CO2 21*      BUN 70*   CREATININE 9.0*   ALKPHOS 102   ALT 19   AST 19   BILITOT 0.5     Lab Results   Component Value Date    WBC 10.43 08/05/2023    HGB 9.1 (L) 08/05/2023    HCT 29.1 (L) 08/05/2023    MCV 96 08/05/2023     08/05/2023     No results for input(s): "TSH", "FREET4" in the last 168 hours.  Lab Results   Component Value Date    HGBA1C 5.6 07/25/2023       Nutritional status:   Body mass index is 25.49 kg/m².  Lab Results   Component Value Date    ALBUMIN 3.1 (L) 08/05/2023    ALBUMIN 3.0 (L) 08/04/2023    ALBUMIN 3.2 (L) 08/03/2023     No results found for: "PREALBUMIN"    Estimated Creatinine Clearance: 9.3 mL/min (A) (based on SCr of 9 mg/dL (H)).    Accu-Checks  Recent Labs     08/02/23  1235 08/02/23  1652 08/02/23  2051 08/03/23  1215 08/03/23  1744 08/03/23  2214 08/04/23  1234 08/04/23  1724 08/04/23  2101 08/05/23  0742   POCTGLUCOSE 165* 192* 250* 179* 211* 206* 148* 277* 150* 108       Current Medications and/or Treatments Impacting Glycemic Control  Immunotherapy:    Immunosuppressants           Stop Route Frequency     tacrolimus capsule 1.5 mg         -- Oral Every evening     tacrolimus capsule 2 mg         -- Oral Every morning          Steroids:   Hormones (From admission, onward)      Start     Stop Route Frequency Ordered    07/28/23 1345  predniSONE tablet 5 mg         -- Oral Daily 07/28/23 1240          Pressors:    Autonomic Drugs (From admission, onward)      None          Hyperglycemia/Diabetes Medications:   Antihyperglycemics (From admission, onward)      Start     Stop Route Frequency Ordered    07/30/23 2100  insulin detemir U-100 (Levemir) pen 8 Units         -- SubQ Nightly 07/30/23 0819    07/26/23 1645  insulin aspart U-100 pen 4 Units         -- SubQ 3 times daily with meals 07/26/23 1610    07/25/23 1840  insulin aspart U-100 pen 1-10 Units         -- SubQ Before meals & nightly PRN 07/25/23 1741            ASSESSMENT and PLAN    Pulmonary  * " Acute on chronic respiratory failure with hypoxia  Managed per primary team  Optimize bg control      Immunology/Multi System  Immunosuppression  On immunosuppressive therapy per transplant team; may elevate BG readings        Endocrine  Type 2 diabetes mellitus with hyperglycemia, with long-term current use of insulin  BG goal: 140-180   T2DM.  Hx of lung transplant w/ CLAD.     - Continue Levemir 8 units q HS   - Continue Novolog 4 units AC  - Continue MDC  - POCT Glucose before meals and at bedtime  - Hypoglycemia protocol in place      ** Please notify Endocrine for any change and/or advance in diet**  ** Please call Endocrine for any BG related issues **     Discharge Planning:   TBD. Please notify endocrinology prior to discharge.            Izabela Caputo, NP  Endocrinology  [facility]

## 2023-08-05 NOTE — PLAN OF CARE
62 year-old male admitted 7/25/23 for hypoxia/PNA. Pt has hx of lung txp 8/22/17, CHF, HTN, PH, ESRD, HD dependent  -DNR  -AAOx4, ambulates independently  -22 G Rt FA  -Lt AV fistula +/+  -HD today, 3 L removed  -Accuchecks AC/HS  -spouse at bedside, pt sitting up in chair, wheels locked, non-skid socks in place, call light within reach  -pending discharge awaiting SNF placement

## 2023-08-05 NOTE — PLAN OF CARE
Pt AAO. VSS. Ambulating in room and hallway. 3L NC %. Pt HD T,Th, Sat- HD ordered for this am. Left fistula (+)/(+). Anuric.   PT/OT following. SNF cx and waiting on placement.  Eating 100% of ADA diet. Accucheck ac/hs - endocrine following.  Skin intact. Pt has nonskid socks on and turns in bed- waffle mattress in place.  Pt is DNR

## 2023-08-05 NOTE — PROCEDURES
[facility] NOTE     Patient currently on hemodialysis for removal of uremic toxins and volume.     Patient seen and evaluated on hemodialysis, tolerating treatment, see HD flowsheet for vitals and assessments.      Ultrafiltration goal is 2-3L     Labs have been reviewed and the dialysate bath has been adjusted.     Assessment/Plan:  Seen on HD this morning, reports feeling better since admission.  No complaints of SOB on exam.  Will continue iHD while IP.  Continue EPO with HD  Phos WNL, continue low phos diet  On Cinacalcet, check PTH    CIELO Yancey, FN-BC  Nephrology  Pager:  [phone]

## 2023-08-05 NOTE — SUBJECTIVE & OBJECTIVE
"Interval HPI:   Overnight events: s/p lung transplant. Remains in TSU; awaiting SNF placement. BG stable on current regimen. Creatinine 9.0; HD today. Diet diabetic [facility];  Calorie    Eatin%  Nausea: No  Hypoglycemia and intervention: No  Fever: No  TPN and/or TF: No  If yes, type of TF/TPN and rate: n/a    BP (!) 163/87   Pulse 94   Temp 97.8 °F (36.6 °C) (Oral)   Resp 20   Ht 5' 11" (1.803 m)   Wt 82.9 kg (182 lb 12.2 oz)   SpO2 99%   BMI 25.49 kg/m²     Labs Reviewed and Include    Recent Labs   Lab 23  0626   *   CALCIUM 9.2   ALBUMIN 3.1*   PROT 6.4      K 4.4   CO2 21*      BUN 70*   CREATININE 9.0*   ALKPHOS 102   ALT 19   AST 19   BILITOT 0.5     Lab Results   Component Value Date    WBC 10.43 2023    HGB 9.1 (L) 2023    HCT 29.1 (L) 2023    MCV 96 2023     2023     No results for input(s): "TSH", "FREET4" in the last 168 hours.  Lab Results   Component Value Date    HGBA1C 5.6 2023       Nutritional status:   Body mass index is 25.49 kg/m².  Lab Results   Component Value Date    ALBUMIN 3.1 (L) 2023    ALBUMIN 3.0 (L) 2023    ALBUMIN 3.2 (L) 2023     No results found for: "PREALBUMIN"    Estimated Creatinine Clearance: 9.3 mL/min (A) (based on SCr of 9 mg/dL (H)).    Accu-Checks  Recent Labs     23  1235 23  1652 23  2051 23  1215 23  1744 23  2214 23  1234 23  1724 23  2101 23  0742   POCTGLUCOSE 165* 192* 250* 179* 211* 206* 148* 277* 150* 108       Current Medications and/or Treatments Impacting Glycemic Control  Immunotherapy:    Immunosuppressants           Stop Route Frequency     tacrolimus capsule 1.5 mg         -- Oral Every evening     tacrolimus capsule 2 mg         -- Oral Every morning          Steroids:   Hormones (From admission, onward)      Start     Stop Route Frequency Ordered    23 1345  predniSONE tablet 5 " mg         -- Oral Daily 07/28/23 1240          Pressors:    Autonomic Drugs (From admission, onward)      None          Hyperglycemia/Diabetes Medications:   Antihyperglycemics (From admission, onward)      Start     Stop Route Frequency Ordered    07/30/23 2100  insulin detemir U-100 (Levemir) pen 8 Units         -- SubQ Nightly 07/30/23 0819    07/26/23 1645  insulin aspart U-100 pen 4 Units         -- SubQ 3 times daily with meals 07/26/23 1610    07/25/23 1840  insulin aspart U-100 pen 1-10 Units         -- SubQ Before meals & nightly PRN 07/25/23 2673

## 2023-08-05 NOTE — PROGRESS NOTES
[facility] - [facility]plant Step[facility]  Lung Transplant  Progress Note - Floor    Patient Name: Martin Hendrix Jr.  MRN: [id number]  Admission Date: 7/25/2023  Hospital Length of Stay: 11 days  Post-Operative Day: 2174  Attending Physician: Robina Mcdonald MD  Primary Care Provider: Michel Henley MD     Subjective:     Interval History: No significant events overnight.  Gradually improving to baseline.  HD today.  Awaiting SNF placement    Continuous Infusions:  Scheduled Meds:   aspirin  81 mg Oral Daily    atorvastatin  20 mg Oral Daily    calcium-vitamin D3  1 tablet Oral BID    cinacalcet  30 mg Oral Daily with breakfast    epoetin greer (PROCRIT) injection  2,000 Units Intravenous Every Tues, Thurs, Sat    famotidine  20 mg Oral Daily    folic acid  1,000 mcg Oral Daily    heparin (porcine)  5,000 Units Subcutaneous Q8H    insulin aspart U-100  4 Units Subcutaneous TIDWM    insulin detemir U-100  8 Units Subcutaneous QHS    magnesium chloride  128 mg Oral BID    predniSONE  5 mg Oral Daily    sulfamethoxazole-trimethoprim 400-80mg  1 tablet Oral Every Mon, Wed, Fri    tacrolimus  1.5 mg Oral Daily PM    tacrolimus  2 mg Oral Daily AM    vitamin D  2,000 Units Oral Daily     PRN Meds:acetaminophen, glucagon (human recombinant), glucose, glucose, insulin aspart U-100, morphine, ondansetron, sodium chloride 0.9%    Review of patient's allergies indicates:  No Known Allergies    Review of Systems   Constitutional:  Positive for activity change. Negative for fatigue.   HENT: Negative.     Respiratory:  Positive for shortness of breath. Negative for cough, chest tightness and wheezing.    Gastrointestinal:  Negative for diarrhea, nausea and vomiting.   Endocrine: Negative.    Genitourinary:  Positive for decreased urine volume.   Allergic/Immunologic: Positive for immunocompromised state.   Neurological:  Positive for weakness (due to deconditioning).   Psychiatric/Behavioral:  Negative for confusion.  The patient is nervous/anxious (at times).      Objective:        Physical Exam  Vitals reviewed.   Constitutional:       General: He is awake.      Appearance: He is well-developed.      Interventions: Nasal cannula in place.   HENT:      Head: Normocephalic and atraumatic.   Eyes:      Extraocular Movements: Extraocular movements intact.      Conjunctiva/sclera: Conjunctivae normal.   Cardiovascular:      Rate and Rhythm: Normal rate and regular rhythm.      Heart sounds: Normal heart sounds.   Pulmonary:      Effort: No tachypnea, accessory muscle usage or respiratory distress.      Breath sounds: Decreased breath sounds present. No wheezing or rhonchi.   Abdominal:      General: Bowel sounds are normal.      Palpations: Abdomen is soft.      Tenderness: There is no abdominal tenderness.   Musculoskeletal:         General: Normal range of motion.   Skin:     General: Skin is warm and dry.   Neurological:      Mental Status: He is alert and oriented to person, place, and time.   Psychiatric:         Mood and Affect: Mood normal.         Speech: Speech normal.         Behavior: Behavior is cooperative.         Thought Content: Thought content normal.         Cognition and Memory: Cognition and memory normal.         Judgment: Judgment normal.                Vital Signs (Most Recent):  Temp: 98.4 °F (36.9 °C) (08/05/23 1227)  Pulse: (!) 111 (08/05/23 1227)  Resp: 20 (08/05/23 1227)  BP: 139/79 (08/05/23 1227)  SpO2: 100 % (08/05/23 1227) Vital Signs (24h Range):  Temp:  [97.8 °F (36.6 °C)-98.4 °F (36.9 °C)] 98.4 °F (36.9 °C)  Pulse:  [] 111  Resp:  [16-20] 20  SpO2:  [97 %-100 %] 100 %  BP: (123-178)/(67-94) 139/79     Weight: 82.9 kg (182 lb 12.2 oz)  Body mass index is 25.49 kg/m².      Intake/Output Summary (Last 24 hours) at 8/5/2023 1244  Last data filed at 8/5/2023 1150  Gross per 24 hour   Intake 900 ml   Output 3650 ml   Net -2750 ml         Significant Labs:  CBC:  Recent Labs   Lab 08/05/23  0684    WBC 10.43   RBC 3.03*   HGB 9.1*   HCT 29.1*      MCV 96   MCH 30.0   MCHC 31.3*       BMP:  Recent Labs   Lab 08/05/23  0626      K 4.4      CO2 21*   BUN 70*   CREATININE 9.0*   CALCIUM 9.2        Tacrolimus Levels:  Recent Labs   Lab 08/05/23  0626   TACROLIMUS 3.3*       Microbiology:  Microbiology Results (last 7 days)       ** No results found for the last 168 hours. **            I have reviewed all pertinent labs within the past 24 hours.    Diagnostic Results:  Labs: Reviewed        Assessment/Plan:     * Acute on chronic respiratory failure with hypoxia  Patient with Hypercapnic and Hypoxic Respiratory failure which is Acute on chronic.  he is on home oxygen at 2 LPM. Supplemental oxygen was provided and noted- Oxygen Concentration 2L NC  .   Signs/symptoms of respiratory failure include- tachypnea, increased work of breathing, respiratory distress and use of accessory muscles. Contributing diagnoses includes - Pleural effusion, Pneumonia and complications of lung transplant Labs and images were reviewed. Patient Has recent ABG, which has been reviewed. Will treat underlying causes and adjust management of respiratory failure as follows-     CXR with noted right sided pleural effusion.  Bedside US showed small amount of fluid noted to right side.     --antibiotics stopped  --Continue Bi-PAP at night, currently weaning down oxygen  --RIP negative, aspergillus antigen and CMV PCR negative  --Nephrology following.    --SNF consult placed  --No increase in oxygen requirements based on walk in horn    Immunosuppression  Continue tacrolimus and prednisone.  Will monitor tacrolimus levels while inpatient    Prophylactic antibiotic  Continue Bactrim    Palliative care encounter  Patient is DNR.  Palliative Care following    ESRD (end stage renal disease) on dialysis  Per Nephrology.  Patient on T,TH,Sat schedule    Type 2 diabetes mellitus with hyperglycemia, with long-term current use of  insulin  Endocrine consult.  Appreciate recs    Complication of lung transplant  Patient with known CLAD/JONELLE.  Patient is a DNR.    Lung replaced by transplant  Underwent BLT on 8/22/2017 complicated by CLAD with chronic hypoxemic respiratory failure.  He was admitted to acute on chronic hypoxemic/hypercarbic respiratory failure due to fluid overload with pneumonia.  Improving to baseline.   Continue OIP and immunosuppression

## 2023-08-06 NOTE — ASSESSMENT & PLAN NOTE
Continue tacrolimus and prednisone.  Adjust Tac per trough.   Evero held due to concerns with evero associated lung injury

## 2023-08-06 NOTE — PLAN OF CARE
Pt AAO. VSS. Ambulating in room and hallway. 3L NC %. Pt HD T,Th, Sat- next session  Saturday  Left fistula (+)/(+). Anuric.   PT/OT following. SNF cx and waiting on placement.  Eating 100% of ADA diet. Accucheck ac/hs - endocrine following.  Skin intact. Pt has nonskid socks on and turns in bed- waffle mattress in place.  Pt is DNR. Bipap on this evening- pt tolerating well.

## 2023-08-06 NOTE — SUBJECTIVE & OBJECTIVE
Subjective:     Interval History: No significant events overnight.  Gradually improving to baseline.  HD yest w/o complications.  Awaiting SNF placement  Tolerated ambulating independently w O2    Continuous Infusions:  Scheduled Meds:   aspirin  81 mg Oral Daily    atorvastatin  20 mg Oral Daily    calcium-vitamin D3  1 tablet Oral BID    cinacalcet  30 mg Oral Daily with breakfast    epoetin greer (PROCRIT) injection  2,000 Units Intravenous Every Tues, Thurs, Sat    famotidine  20 mg Oral Daily    folic acid  1,000 mcg Oral Daily    heparin (porcine)  5,000 Units Subcutaneous Q8H    insulin aspart U-100  4 Units Subcutaneous TIDWM    insulin detemir U-100  8 Units Subcutaneous QHS    magnesium chloride  128 mg Oral BID    predniSONE  5 mg Oral Daily    sulfamethoxazole-trimethoprim 400-80mg  1 tablet Oral Every Mon, Wed, Fri    tacrolimus  1.5 mg Oral Daily PM    tacrolimus  2 mg Oral Daily AM    vitamin D  2,000 Units Oral Daily     PRN Meds:acetaminophen, glucagon (human recombinant), glucose, glucose, insulin aspart U-100, morphine, ondansetron, sodium chloride 0.9%    Review of patient's allergies indicates:  No Known Allergies    Review of Systems   Constitutional:  Positive for activity change. Negative for fatigue.   HENT: Negative.     Respiratory:  Positive for shortness of breath. Negative for cough, chest tightness and wheezing.    Gastrointestinal:  Negative for diarrhea, nausea and vomiting.   Endocrine: Negative.    Genitourinary:  Positive for decreased urine volume.   Allergic/Immunologic: Positive for immunocompromised state.   Neurological:  Positive for weakness (due to deconditioning).   Psychiatric/Behavioral:  Negative for confusion. The patient is nervous/anxious (at times).      Objective:        Physical Exam  Vitals reviewed.   Constitutional:       General: He is awake.      Appearance: He is well-developed.      Interventions: Nasal cannula in place.   HENT:      Head: Normocephalic and  atraumatic.   Eyes:      Extraocular Movements: Extraocular movements intact.      Conjunctiva/sclera: Conjunctivae normal.   Cardiovascular:      Rate and Rhythm: Normal rate and regular rhythm.      Heart sounds: Normal heart sounds.   Pulmonary:      Effort: No tachypnea, accessory muscle usage or respiratory distress.      Breath sounds: Decreased breath sounds present. No wheezing or rhonchi.   Abdominal:      General: Bowel sounds are normal.      Palpations: Abdomen is soft.      Tenderness: There is no abdominal tenderness.   Musculoskeletal:         General: Normal range of motion.   Skin:     General: Skin is warm and dry.   Neurological:      Mental Status: He is alert and oriented to person, place, and time.   Psychiatric:         Mood and Affect: Mood normal.         Speech: Speech normal.         Behavior: Behavior is cooperative.         Thought Content: Thought content normal.         Cognition and Memory: Cognition and memory normal.         Judgment: Judgment normal.                Vital Signs (Most Recent):  Temp: 98.6 °F (37 °C) (08/06/23 1242)  Pulse: 106 (08/06/23 1639)  Resp: 20 (08/06/23 1639)  BP: 131/81 (08/06/23 1639)  SpO2: 100 % (08/06/23 1639) Vital Signs (24h Range):  Temp:  [98 °F (36.7 °C)-99.2 °F (37.3 °C)] 98.6 °F (37 °C)  Pulse:  [] 106  Resp:  [18-22] 20  SpO2:  [98 %-100 %] 100 %  BP: (129-174)/(73-84) 131/81     Weight: 82 kg (180 lb 12.4 oz)  Body mass index is 25.21 kg/m².      Intake/Output Summary (Last 24 hours) at 8/6/2023 1656  Last data filed at 8/5/2023 2225  Gross per 24 hour   Intake 250 ml   Output --   Net 250 ml         Significant Labs:  CBC:  Recent Labs   Lab 08/05/23 0626   WBC 10.43   RBC 3.03*   HGB 9.1*   HCT 29.1*      MCV 96   MCH 30.0   MCHC 31.3*       BMP:  Recent Labs   Lab 08/05/23 0626      K 4.4      CO2 21*   BUN 70*   CREATININE 9.0*   CALCIUM 9.2        Tacrolimus Levels:  Recent Labs   Lab 08/05/23 0626   TACROLIMUS  3.3*       Microbiology:  Microbiology Results (last 7 days)       ** No results found for the last 168 hours. **            I have reviewed all pertinent labs within the past 24 hours.    Diagnostic Results:  Labs: Reviewed

## 2023-08-06 NOTE — PROGRESS NOTES
[facility]  Lung Transplant  Progress Note - Floor    Patient Name: Martin Hendrix Jr.  MRN: [id number]  Admission Date: 7/25/2023  Hospital Length of Stay: 12 days  Post-Operative Day: 2175  Attending Physician: Robina Mcdonald MD  Primary Care Provider: Michel Henley MD     Subjective:     Interval History: No significant events overnight.  Gradually improving to baseline.  HD yest w/o complications.  Awaiting SNF placement  Tolerated ambulating independently w O2    Continuous Infusions:  Scheduled Meds:   aspirin  81 mg Oral Daily    atorvastatin  20 mg Oral Daily    calcium-vitamin D3  1 tablet Oral BID    cinacalcet  30 mg Oral Daily with breakfast    epoetin greer (PROCRIT) injection  2,000 Units Intravenous Every Tues, Thurs, Sat    famotidine  20 mg Oral Daily    folic acid  1,000 mcg Oral Daily    heparin (porcine)  5,000 Units Subcutaneous Q8H    insulin aspart U-100  4 Units Subcutaneous TIDWM    insulin detemir U-100  8 Units Subcutaneous QHS    magnesium chloride  128 mg Oral BID    predniSONE  5 mg Oral Daily    sulfamethoxazole-trimethoprim 400-80mg  1 tablet Oral Every Mon, Wed, Fri    tacrolimus  1.5 mg Oral Daily PM    tacrolimus  2 mg Oral Daily AM    vitamin D  2,000 Units Oral Daily     PRN Meds:acetaminophen, glucagon (human recombinant), glucose, glucose, insulin aspart U-100, morphine, ondansetron, sodium chloride 0.9%    Review of patient's allergies indicates:  No Known Allergies    Review of Systems   Constitutional:  Positive for activity change. Negative for fatigue.   HENT: Negative.     Respiratory:  Positive for shortness of breath. Negative for cough, chest tightness and wheezing.    Gastrointestinal:  Negative for diarrhea, nausea and vomiting.   Endocrine: Negative.    Genitourinary:  Positive for decreased urine volume.   Allergic/Immunologic: Positive for immunocompromised state.   Neurological:  Positive for weakness (due to  deconditioning).   Psychiatric/Behavioral:  Negative for confusion. The patient is nervous/anxious (at times).      Objective:        Physical Exam  Vitals reviewed.   Constitutional:       General: He is awake.      Appearance: He is well-developed.      Interventions: Nasal cannula in place.   HENT:      Head: Normocephalic and atraumatic.   Eyes:      Extraocular Movements: Extraocular movements intact.      Conjunctiva/sclera: Conjunctivae normal.   Cardiovascular:      Rate and Rhythm: Normal rate and regular rhythm.      Heart sounds: Normal heart sounds.   Pulmonary:      Effort: No tachypnea, accessory muscle usage or respiratory distress.      Breath sounds: Decreased breath sounds present. No wheezing or rhonchi.   Abdominal:      General: Bowel sounds are normal.      Palpations: Abdomen is soft.      Tenderness: There is no abdominal tenderness.   Musculoskeletal:         General: Normal range of motion.   Skin:     General: Skin is warm and dry.   Neurological:      Mental Status: He is alert and oriented to person, place, and time.   Psychiatric:         Mood and Affect: Mood normal.         Speech: Speech normal.         Behavior: Behavior is cooperative.         Thought Content: Thought content normal.         Cognition and Memory: Cognition and memory normal.         Judgment: Judgment normal.                Vital Signs (Most Recent):  Temp: 98.6 °F (37 °C) (08/06/23 1242)  Pulse: 106 (08/06/23 1639)  Resp: 20 (08/06/23 1639)  BP: 131/81 (08/06/23 1639)  SpO2: 100 % (08/06/23 1639) Vital Signs (24h Range):  Temp:  [98 °F (36.7 °C)-99.2 °F (37.3 °C)] 98.6 °F (37 °C)  Pulse:  [] 106  Resp:  [18-22] 20  SpO2:  [98 %-100 %] 100 %  BP: (129-174)/(73-84) 131/81     Weight: 82 kg (180 lb 12.4 oz)  Body mass index is 25.21 kg/m².      Intake/Output Summary (Last 24 hours) at 8/6/2023 1656  Last data filed at 8/5/2023 2225  Gross per 24 hour   Intake 250 ml   Output --   Net 250 ml         Significant  Labs:  CBC:  Recent Labs   Lab 08/05/23  0626   WBC 10.43   RBC 3.03*   HGB 9.1*   HCT 29.1*      MCV 96   MCH 30.0   MCHC 31.3*       BMP:  Recent Labs   Lab 08/05/23 0626      K 4.4      CO2 21*   BUN 70*   CREATININE 9.0*   CALCIUM 9.2        Tacrolimus Levels:  Recent Labs   Lab 08/05/23 0626   TACROLIMUS 3.3*       Microbiology:  Microbiology Results (last 7 days)       ** No results found for the last 168 hours. **            I have reviewed all pertinent labs within the past 24 hours.    Diagnostic Results:  Labs: Reviewed        Assessment/Plan:     * Acute on chronic respiratory failure with hypoxia  Patient with Hypercapnic and Hypoxic Respiratory failure which is Acute on chronic.  he is on home oxygen at 2 LPM. Supplemental oxygen was provided and noted- Oxygen Concentration 2L NC  .   Signs/symptoms of respiratory failure include- tachypnea, increased work of breathing, respiratory distress and use of accessory muscles. Contributing diagnoses includes - Pleural effusion, Pneumonia and complications of lung transplant Labs and images were reviewed. Patient Has recent ABG, which has been reviewed. Will treat underlying causes and adjust management of respiratory failure as follows-     CXR with noted right sided pleural effusion.  Bedside US showed small amount of fluid noted to right side.     --antibiotics stopped  --Continue Bi-PAP at night, currently weaning down oxygen  --RIP negative, aspergillus antigen and CMV PCR negative  --Nephrology following.    --SNF consult placed  --No increase in oxygen requirements based on walk in horn    Immunosuppression  Continue tacrolimus and prednisone.  Adjust Tac per trough.   Evero held due to concerns with evero associated lung injury    Prophylactic antibiotic  Continue Bactrim    Palliative care encounter  Patient is DNR.  Palliative Care following    ESRD (end stage renal disease) on dialysis  Per Nephrology.  Patient on T,TH,Sat  schedule    Type 2 diabetes mellitus with hyperglycemia, with long-term current use of insulin  Endocrine consult.  Appreciate recs    Complication of lung transplant  Patient with known CLAD/JONELLE.  Patient is a DNR.    Lung replaced by transplant  Underwent BLT on 8/22/2017 complicated by CLAD with chronic hypoxemic respiratory failure.  He was admitted to acute on chronic hypoxemic/hypercarbic respiratory failure due to fluid overload with pneumonia.  Improving to baseline.   Continue OIP and immunosuppression

## 2023-08-06 NOTE — PLAN OF CARE
62 year-old male admitted 7/25/23 for hypoxia/PNA. Pt has hx of lung txp 8/22/17, CHF, HTN, PH, ESRD, HD dependent  -DNR  -AAOx4, ambulates independently  -22 G Rt FA  -Lt AV fistula +/+  -NC @ 3L  -Accuchecks AC/HS  -pt ambulated in horn with O2  -spouse at bedside, pt sitting up in chair, wheels locked, non-skid socks in place, call light within reach  -pending discharge awaiting SNF placement

## 2023-08-06 NOTE — PROGRESS NOTES
"[facility] - Transplant Stepdown  Endocrinology  Progress Note    Admit Date: 2023     RReason for Consult: Management of T2DM, Hyperglycemia     Surgical Procedure and Date: Double lung transplant in 2017      Diabetes diagnosis year:      Home Diabetes Medications:  Tresiba 20 unit HS & Humalog 10u AC  (per med rec)     How often checking glucose at home? CAROLA  BG readings on regimen: CAROLA  Hypoglycemia on the regimen?  No  Missed doses on regimen?  CAROLA     Diabetes Complications include:     Hyperglycemia and Diabetic peripheral neuropathy      Complicating diabetes co morbidities:   KRISTYN and Glucocorticoid use       HPI:   Patient is a 62 y.o. 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 He has felt short of breath for the last week and said he has been going to his dialysis as scheduled. He denied fever, chills, cough, N/V/D. He has been compliant with his anti-rejection meds. Found to have a multifocal pneumonia on CXR; Covid and Trop negative. While awaiting transfer to [facility] for transplant care- his respiratory status worsened requiring continuous bipap. Arrived at [facility] MICU on . He received HD at [facility] the morning of his transfer.   Endocrine consulted for bg management.          Interval HPI:   Overnight events: Remains in TSU; awaiting SNF placement. BG stable on current SQ insulin regimen. Remains on Prednisone 5 mg daily. Diet diabetic [facility];  Calorie    Eatin%  Nausea: No  Hypoglycemia and intervention: No  Fever: No  TPN and/or TF: No  If yes, type of TF/TPN and rate: n/a    /81 (BP Location: Right arm, Patient Position: Sitting)   Pulse 99   Temp 98.2 °F (36.8 °C) (Oral)   Resp 20   Ht 5' 11" (1.803 m)   Wt 82 kg (180 lb 12.4 oz)   SpO2 100%   BMI 25.21 kg/m²     Labs Reviewed and Include    No results for input(s): "GLU", "CALCIUM", " ""ALBUMIN", "PROT", "NA", "K", "CO2", "CL", "BUN", "CREATININE", "ALKPHOS", "ALT", "AST", "BILITOT" in the last 24 hours.  Lab Results   Component Value Date    WBC 10.43 08/05/2023    HGB 9.1 (L) 08/05/2023    HCT 29.1 (L) 08/05/2023    MCV 96 08/05/2023     08/05/2023     No results for input(s): "TSH", "FREET4" in the last 168 hours.  Lab Results   Component Value Date    HGBA1C 5.6 07/25/2023       Nutritional status:   Body mass index is 25.21 kg/m².  Lab Results   Component Value Date    ALBUMIN 3.1 (L) 08/05/2023    ALBUMIN 3.0 (L) 08/04/2023    ALBUMIN 3.2 (L) 08/03/2023     No results found for: "PREALBUMIN"    Estimated Creatinine Clearance: 9.3 mL/min (A) (based on SCr of 9 mg/dL (H)).    Accu-Checks  Recent Labs     08/03/23  1215 08/03/23  1744 08/03/23  2214 08/04/23  1234 08/04/23  1724 08/04/23  2101 08/05/23  0742 08/05/23  1724 08/05/23  2044   POCTGLUCOSE 179* 211* 206* 148* 277* 150* 108 228* 183*       Current Medications and/or Treatments Impacting Glycemic Control  Immunotherapy:    Immunosuppressants           Stop Route Frequency     tacrolimus capsule 1.5 mg         -- Oral Every evening     tacrolimus capsule 2 mg         -- Oral Every morning          Steroids:   Hormones (From admission, onward)      Start     Stop Route Frequency Ordered    07/28/23 1345  predniSONE tablet 5 mg         -- Oral Daily 07/28/23 1240          Pressors:    Autonomic Drugs (From admission, onward)      None          Hyperglycemia/Diabetes Medications:   Antihyperglycemics (From admission, onward)      Start     Stop Route Frequency Ordered    07/30/23 2100  insulin detemir U-100 (Levemir) pen 8 Units         -- SubQ Nightly 07/30/23 0819    07/26/23 1645  insulin aspart U-100 pen 4 Units         -- SubQ 3 times daily with meals 07/26/23 1610    07/25/23 1840  insulin aspart U-100 pen 1-10 Units         -- SubQ Before meals & nightly PRN 07/25/23 9482            ASSESSMENT and PLAN    Pulmonary  * Acute " on chronic respiratory failure with hypoxia  Managed per primary team  Optimize bg control      Immunology/Multi System  Immunosuppression  On immunosuppressive therapy per transplant team; may elevate BG readings        Endocrine  Type 2 diabetes mellitus with hyperglycemia, with long-term current use of insulin  BG goal: 140-180   T2DM.  Hx of lung transplant w/ CLAD.     - Continue Levemir 8 units q HS   - Continue Novolog 4 units AC  - Continue MDC  - POCT Glucose before meals and at bedtime  - Hypoglycemia protocol in place      ** Please notify Endocrine for any change and/or advance in diet**  ** Please call Endocrine for any BG related issues **     Discharge Planning:   TBD. Please notify endocrinology prior to discharge.            Izabela Caputo, NP  Endocrinology  [facility] - Transplant Stepdown

## 2023-08-06 NOTE — SUBJECTIVE & OBJECTIVE
"Interval HPI:   Overnight events: Remains in TSU; awaiting SNF placement. BG stable on current SQ insulin regimen. Remains on Prednisone 5 mg daily. Diet diabetic [facility];  Calorie    Eatin%  Nausea: No  Hypoglycemia and intervention: No  Fever: No  TPN and/or TF: No  If yes, type of TF/TPN and rate: n/a    /81 (BP Location: Right arm, Patient Position: Sitting)   Pulse 99   Temp 98.2 °F (36.8 °C) (Oral)   Resp 20   Ht 5' 11" (1.803 m)   Wt 82 kg (180 lb 12.4 oz)   SpO2 100%   BMI 25.21 kg/m²     Labs Reviewed and Include    No results for input(s): "GLU", "CALCIUM", "ALBUMIN", "PROT", "NA", "K", "CO2", "CL", "BUN", "CREATININE", "ALKPHOS", "ALT", "AST", "BILITOT" in the last 24 hours.  Lab Results   Component Value Date    WBC 10.43 2023    HGB 9.1 (L) 2023    HCT 29.1 (L) 2023    MCV 96 2023     2023     No results for input(s): "TSH", "FREET4" in the last 168 hours.  Lab Results   Component Value Date    HGBA1C 5.6 2023       Nutritional status:   Body mass index is 25.21 kg/m².  Lab Results   Component Value Date    ALBUMIN 3.1 (L) 2023    ALBUMIN 3.0 (L) 2023    ALBUMIN 3.2 (L) 2023     No results found for: "PREALBUMIN"    Estimated Creatinine Clearance: 9.3 mL/min (A) (based on SCr of 9 mg/dL (H)).    Accu-Checks  Recent Labs     23  1215 23  1744 23  2214 23  1234 23  1724 23  2101 23  0742 23  1724 23  2044   POCTGLUCOSE 179* 211* 206* 148* 277* 150* 108 228* 183*       Current Medications and/or Treatments Impacting Glycemic Control  Immunotherapy:    Immunosuppressants           Stop Route Frequency     tacrolimus capsule 1.5 mg         -- Oral Every evening     tacrolimus capsule 2 mg         -- Oral Every morning          Steroids:   Hormones (From admission, onward)      Start     Stop Route Frequency Ordered    23 1345  predniSONE tablet 5 mg      "    -- Oral Daily 07/28/23 1240          Pressors:    Autonomic Drugs (From admission, onward)      None          Hyperglycemia/Diabetes Medications:   Antihyperglycemics (From admission, onward)      Start     Stop Route Frequency Ordered    07/30/23 2100  insulin detemir U-100 (Levemir) pen 8 Units         -- SubQ Nightly 07/30/23 0819    07/26/23 1645  insulin aspart U-100 pen 4 Units         -- SubQ 3 times daily with meals 07/26/23 1610    07/25/23 1840  insulin aspart U-100 pen 1-10 Units         -- SubQ Before meals & nightly PRN 07/25/23 6335

## 2023-08-07 NOTE — SUBJECTIVE & OBJECTIVE
Subjective:     Interval History: No acute events overnight. OOBTC this AM. On 3L NC. Ambulating hallways. Tolerating diet and having Bms. HD tomorrow with plan to discharge home with HH.     Continuous Infusions:  Scheduled Meds:   aspirin  81 mg Oral Daily    atorvastatin  20 mg Oral Daily    calcium-vitamin D3  1 tablet Oral BID    cinacalcet  30 mg Oral Daily with breakfast    epoetin greer (PROCRIT) injection  2,000 Units Intravenous Every Tues, Thurs, Sat    famotidine  20 mg Oral Daily    folic acid  1,000 mcg Oral Daily    heparin (porcine)  5,000 Units Subcutaneous Q8H    insulin aspart U-100  4 Units Subcutaneous TIDWM    insulin detemir U-100  8 Units Subcutaneous QHS    magnesium chloride  128 mg Oral BID    predniSONE  5 mg Oral Daily    sulfamethoxazole-trimethoprim 400-80mg  1 tablet Oral Every Mon, Wed, Fri    tacrolimus  1.5 mg Oral Daily PM    tacrolimus  2 mg Oral Daily AM    vitamin D  2,000 Units Oral Daily     PRN Meds:acetaminophen, glucagon (human recombinant), glucose, glucose, insulin aspart U-100, morphine, ondansetron, sodium chloride 0.9%    Review of patient's allergies indicates:  No Known Allergies    Review of Systems   Constitutional:  Positive for activity change. Negative for fatigue.   HENT: Negative.     Respiratory:  Positive for shortness of breath. Negative for cough, chest tightness and wheezing.    Gastrointestinal:  Negative for diarrhea, nausea and vomiting.   Endocrine: Negative.    Genitourinary:  Positive for decreased urine volume.   Allergic/Immunologic: Positive for immunocompromised state.   Neurological:  Positive for weakness (due to deconditioning).   Psychiatric/Behavioral:  Negative for confusion. The patient is nervous/anxious (at times).      Objective:        Physical Exam  Vitals reviewed.   Constitutional:       General: He is awake.      Appearance: He is well-developed.      Interventions: Nasal cannula in place.   HENT:      Head: Normocephalic and  atraumatic.   Eyes:      Extraocular Movements: Extraocular movements intact.      Conjunctiva/sclera: Conjunctivae normal.   Cardiovascular:      Rate and Rhythm: Normal rate and regular rhythm.      Heart sounds: Normal heart sounds.   Pulmonary:      Effort: No tachypnea, accessory muscle usage or respiratory distress.      Breath sounds: Decreased breath sounds present. No wheezing or rhonchi.   Abdominal:      General: Bowel sounds are normal.      Palpations: Abdomen is soft.      Tenderness: There is no abdominal tenderness.   Musculoskeletal:         General: Normal range of motion.   Skin:     General: Skin is warm and dry.   Neurological:      Mental Status: He is alert and oriented to person, place, and time.   Psychiatric:         Mood and Affect: Mood normal.         Speech: Speech normal.         Behavior: Behavior is cooperative.         Thought Content: Thought content normal.         Cognition and Memory: Cognition and memory normal.         Judgment: Judgment normal.                Vital Signs (Most Recent):  Temp: 97.6 °F (36.4 °C) (08/07/23 0900)  Pulse: 84 (08/07/23 0900)  Resp: 20 (08/07/23 0900)  BP: (!) 176/84 (08/07/23 0900)  SpO2: 100 % (08/07/23 0900) Vital Signs (24h Range):  Temp:  [97.6 °F (36.4 °C)-98.6 °F (37 °C)] 97.6 °F (36.4 °C)  Pulse:  [] 84  Resp:  [17-30] 20  SpO2:  [99 %-100 %] 100 %  BP: (131-176)/(70-84) 176/84     Weight: 82 kg (180 lb 12.4 oz)  Body mass index is 25.21 kg/m².      Intake/Output Summary (Last 24 hours) at 8/7/2023 1057  Last data filed at 8/7/2023 0003  Gross per 24 hour   Intake 420 ml   Output 0 ml   Net 420 ml         Significant Labs:  CBC:  Recent Labs   Lab 08/05/23 0626   WBC 10.43   RBC 3.03*   HGB 9.1*   HCT 29.1*      MCV 96   MCH 30.0   MCHC 31.3*       BMP:  Recent Labs   Lab 08/05/23 0626      K 4.4      CO2 21*   BUN 70*   CREATININE 9.0*   CALCIUM 9.2        Tacrolimus Levels:  Recent Labs   Lab 08/05/23  3225    TACROLIMUS 3.3*       Microbiology:  Microbiology Results (last 7 days)       ** No results found for the last 168 hours. **            I have reviewed all pertinent labs within the past 24 hours.    Diagnostic Results:  Labs: Reviewed

## 2023-08-07 NOTE — SUBJECTIVE & OBJECTIVE
"Interval HPI:   Overnight events: NAEON. Remains in TSU. HD yesterday. Awaiting SNF placement. BG reasonably well controlled on current SQ insulin regimen. Diet diabetic [facility];  Calorie    Eatin%  Nausea: No  Hypoglycemia and intervention: No  Fever: No  TPN and/or TF: No  If yes, type of TF/TPN and rate: n/a    BP (!) 176/84 (BP Location: Right arm, Patient Position: Standing)   Pulse 84   Temp 97.6 °F (36.4 °C) (Oral)   Resp 20   Ht 5' 11" (1.803 m)   Wt 82 kg (180 lb 12.4 oz)   SpO2 100%   BMI 25.21 kg/m²     Labs Reviewed and Include    No results for input(s): "GLU", "CALCIUM", "ALBUMIN", "PROT", "NA", "K", "CO2", "CL", "BUN", "CREATININE", "ALKPHOS", "ALT", "AST", "BILITOT" in the last 24 hours.  Lab Results   Component Value Date    WBC 10.43 2023    HGB 9.1 (L) 2023    HCT 29.1 (L) 2023    MCV 96 2023     2023     No results for input(s): "TSH", "FREET4" in the last 168 hours.  Lab Results   Component Value Date    HGBA1C 5.6 2023       Nutritional status:   Body mass index is 25.21 kg/m².  Lab Results   Component Value Date    ALBUMIN 3.1 (L) 2023    ALBUMIN 3.0 (L) 2023    ALBUMIN 3.2 (L) 2023     No results found for: "PREALBUMIN"    Estimated Creatinine Clearance: 9.3 mL/min (A) (based on SCr of 9 mg/dL (H)).    Accu-Checks  Recent Labs     23  1724 23  2101 23  0742 23  1121 23  1724 23  2044 23  1238 23  1658 23  2019 23  0850   POCTGLUCOSE 277* 150* 108 145* 228* 183* 139* 187* 229* 104       Current Medications and/or Treatments Impacting Glycemic Control  Immunotherapy:    Immunosuppressants           Stop Route Frequency     tacrolimus capsule 1.5 mg         -- Oral Every evening     tacrolimus capsule 2 mg         -- Oral Every morning          Steroids:   Hormones (From admission, onward)      Start     Stop Route Frequency Ordered    23 1345  " predniSONE tablet 5 mg         -- Oral Daily 07/28/23 1240          Pressors:    Autonomic Drugs (From admission, onward)      None          Hyperglycemia/Diabetes Medications:   Antihyperglycemics (From admission, onward)      Start     Stop Route Frequency Ordered    07/30/23 2100  insulin detemir U-100 (Levemir) pen 8 Units         -- SubQ Nightly 07/30/23 0819    07/26/23 1645  insulin aspart U-100 pen 4 Units         -- SubQ 3 times daily with meals 07/26/23 1610    07/25/23 1840  insulin aspart U-100 pen 1-10 Units         -- SubQ Before meals & nightly PRN 07/25/23 3194

## 2023-08-07 NOTE — PROGRESS NOTES
Discharge Planning:    Pt to tentatively discharge home tomorrow with [facility] ([place], [phone]) and a Bipap machine w/ [facility] (ph: [phone], fx: [phone]). Pt is aware of and involved in discharge planning. Patient reports coping appropriately with ongoing medical issues. Pt continues to have good support from family. Pt's sister at bedside. Psychosocial support provided to the patient. SW will continue to provide psychosocial support, education, resources and assistance with all discharge planning needs as appropriate. Pt aware of how to contact SW.

## 2023-08-07 NOTE — ASSESSMENT & PLAN NOTE
Patient with Hypercapnic and Hypoxic Respiratory failure which is Acute on chronic.  he is on home oxygen at 2 LPM. Supplemental oxygen was provided and noted- Oxygen Concentration 2L NC  .   Signs/symptoms of respiratory failure include- tachypnea, increased work of breathing, respiratory distress and use of accessory muscles. Contributing diagnoses includes - Pleural effusion, Pneumonia and complications of lung transplant Labs and images were reviewed. Patient Has recent ABG, which has been reviewed. Will treat underlying causes and adjust management of respiratory failure as follows-     CXR with noted right sided pleural effusion.  Bedside US showed small amount of fluid noted to right side.     --antibiotics stopped  --Continue Bi-PAP at night, currently weaning down oxygen  --RIP negative, aspergillus antigen and CMV PCR negative  --Nephrology following.    --back on baseline oxygen requirements  --plan for discharge home with HH and nightly BiPAP tomorrow after HD

## 2023-08-07 NOTE — ASSESSMENT & PLAN NOTE
62 y.o. White Male ESRD-HD M-W-F presents to ED on 7/25/2023 with diagnosis of: Bilateral pneumonia [J18.9];Acute on chronic respiratory failure with hypoxia [J96.21]   Nephrology consulted for inpatient ESRD-HD management    ESRD on IHD  TTS  -  Last iHD 7/25  [facility]/ Dr. Zenia Peacock.  Duration of outpatient dialysis session - 3.5 hrs  EDW: 87.5 kg   Access: LUE brachiocephalic fistula, created 10/13/2021 by Dr Thomason    Assessment:   - HD in am (8/8), plan to discharge home tomorrow following HD   - Continue to monitor intake and output  - Please avoid gadolinium, fleets, phos-based laxatives, NSAIDs  - Dialysis thrice weekly unless more urgent indications arise. Will evaluate RRT requirements Daily.    Anemia of ESRD   Recent Labs   Lab 08/03/23  0619 08/04/23  0641 08/05/23  0626   WBC 9.27 10.15 10.43   HGB 10.0* 9.4* 9.1*   HCT 32.2* 31.1* 29.1*    310 351     Lab Results   Component Value Date    FESATURATED 17 (L) 03/14/2022    FERRITIN 1,619 (H) 03/14/2022       - Goal in ESRD is Hgb of 10-11. Hgb 11.3  - Epogen with HD    Mineral Bone Disease in ESRD   Lab Results   Component Value Date    .8 (H) 08/06/2023    CALCIUM 9.2 08/05/2023    ALBUMIN 3.1 (L) 08/05/2023    CAION 1.20 07/25/2023    PHOS 3.9 08/05/2023       - F/U PO4, Mg, Calcium. And albumin levels.   - Renal diet with protein intake goal 1.5 g/kg/d with 1 L fluid restriction when appropriate  - Phos 3.9, no binders  - Continue Cinacalcet   - Daily renal panel so that phos and albumin is monitored daily.

## 2023-08-07 NOTE — ASSESSMENT & PLAN NOTE
Continue tacrolimus and prednisone. Evero discontinued due to concerns with evero associated lung injury. Will discharge home on tacrolimus 2 mg BID.

## 2023-08-07 NOTE — PROGRESS NOTES
"[facility] - Transplant Stepdown  Endocrinology  Progress Note    Admit Date: 2023     RReason for Consult: Management of T2DM, Hyperglycemia     Surgical Procedure and Date: Double lung transplant in 2017      Diabetes diagnosis year:      Home Diabetes Medications:  Tresiba 20 unit HS & Humalog 10u AC  (per med rec)     How often checking glucose at home? CAROLA  BG readings on regimen: CAROLA  Hypoglycemia on the regimen?  No  Missed doses on regimen?  CAROLA     Diabetes Complications include:     Hyperglycemia and Diabetic peripheral neuropathy      Complicating diabetes co morbidities:   KRISTYN and Glucocorticoid use       HPI:   Patient is a 62 y.o. 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 He has felt short of breath for the last week and said he has been going to his dialysis as scheduled. He denied fever, chills, cough, N/V/D. He has been compliant with his anti-rejection meds. Found to have a multifocal pneumonia on CXR; Covid and Trop negative. While awaiting transfer to [facility] [place] for transplant care- his respiratory status worsened requiring continuous bipap. Arrived at [facility] [place] MICU on . He received HD at [place] the morning of his transfer.   Endocrine consulted for bg management.          Interval HPI:   Overnight events: NAEON. Remains in TSU. HD yesterday. Awaiting SNF placement. BG reasonably well controlled on current SQ insulin regimen. Diet diabetic [facility];  Calorie    Eatin%  Nausea: No  Hypoglycemia and intervention: No  Fever: No  TPN and/or TF: No  If yes, type of TF/TPN and rate: n/a    BP (!) 176/84 (BP Location: Right arm, Patient Position: Standing)   Pulse 84   Temp 97.6 °F (36.4 °C) (Oral)   Resp 20   Ht 5' 11" (1.803 m)   Wt 82 kg (180 lb 12.4 oz)   SpO2 100%   BMI 25.21 kg/m²     Labs Reviewed and Include    No results for input(s): "GLU", " ""CALCIUM", "ALBUMIN", "PROT", "NA", "K", "CO2", "CL", "BUN", "CREATININE", "ALKPHOS", "ALT", "AST", "BILITOT" in the last 24 hours.  Lab Results   Component Value Date    WBC 10.43 08/05/2023    HGB 9.1 (L) 08/05/2023    HCT 29.1 (L) 08/05/2023    MCV 96 08/05/2023     08/05/2023     No results for input(s): "TSH", "FREET4" in the last 168 hours.  Lab Results   Component Value Date    HGBA1C 5.6 07/25/2023       Nutritional status:   Body mass index is 25.21 kg/m².  Lab Results   Component Value Date    ALBUMIN 3.1 (L) 08/05/2023    ALBUMIN 3.0 (L) 08/04/2023    ALBUMIN 3.2 (L) 08/03/2023     No results found for: "PREALBUMIN"    Estimated Creatinine Clearance: 9.3 mL/min (A) (based on SCr of 9 mg/dL (H)).    Accu-Checks  Recent Labs     08/04/23  1724 08/04/23  2101 08/05/23  0742 08/05/23  1121 08/05/23  1724 08/05/23  2044 08/06/23  1238 08/06/23  1658 08/06/23  2019 08/07/23  0850   POCTGLUCOSE 277* 150* 108 145* 228* 183* 139* 187* 229* 104       Current Medications and/or Treatments Impacting Glycemic Control  Immunotherapy:    Immunosuppressants           Stop Route Frequency     tacrolimus capsule 1.5 mg         -- Oral Every evening     tacrolimus capsule 2 mg         -- Oral Every morning          Steroids:   Hormones (From admission, onward)      Start     Stop Route Frequency Ordered    07/28/23 1345  predniSONE tablet 5 mg         -- Oral Daily 07/28/23 1240          Pressors:    Autonomic Drugs (From admission, onward)      None          Hyperglycemia/Diabetes Medications:   Antihyperglycemics (From admission, onward)      Start     Stop Route Frequency Ordered    07/30/23 2100  insulin detemir U-100 (Levemir) pen 8 Units         -- SubQ Nightly 07/30/23 0819    07/26/23 1645  insulin aspart U-100 pen 4 Units         -- SubQ 3 times daily with meals 07/26/23 1610    07/25/23 1840  insulin aspart U-100 pen 1-10 Units         -- SubQ Before meals & nightly PRN 07/25/23 1741            ASSESSMENT " and PLAN    Pulmonary  * Acute on chronic respiratory failure with hypoxia  Managed per primary team  Optimize bg control      Immunology/Multi System  Immunosuppression  On immunosuppressive therapy per transplant team; may elevate BG readings        Endocrine  Type 2 diabetes mellitus with hyperglycemia, with long-term current use of insulin  BG goal: 140-180   T2DM.  Hx of lung transplant w/ CLAD.     - Continue Levemir 8 units q HS   - Continue Novolog 4 units AC  - Continue MDC  - POCT Glucose before meals and at bedtime  - Hypoglycemia protocol in place      ** Please notify Endocrine for any change and/or advance in diet**  ** Please call Endocrine for any BG related issues **     Discharge Planning:   TBD. Please notify endocrinology prior to discharge.            Izabela Caputo, NP  Endocrinology  [facility] - Transplant Stepdown

## 2023-08-07 NOTE — PLAN OF CARE
[facility]    HOME HEALTH ORDERS  FACE TO FACE ENCOUNTER    Patient Name: Martin Hendrix Jr.  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 [Z94.2]  Not Applicable     Priority: 1 - High     - Established with transplant speciality   - Patient status post lung transplant x2 (2017)  - Patient is on Prograf and everolimus      Immunosuppression [D84.9]  Yes     Priority: 2     Prophylactic antibiotic [Z79.2]  Not Applicable     Priority: 3     Type 2 diabetes mellitus with hyperglycemia, with long-term current use of insulin [E11.65, Z79.4]  Not Applicable     Priority: 6      Chronic     Initial HPI:  Reviewed Diabetes Management StatusStatin:TakingACE/ARB: Taking  December 2020:  Reviewed Diabetes Management Status  Patient reports taking Humalog sliding scale as needed in addition to long-acting insulin.  Statin: Taking  ACE/ARB: Taking    Screening or Prevention Patient's value Goal Complete/Controlled?   HgA1C Testing and Control   Lab Results   Component Value Date    HGBA1C 13.3 (H) 10/13/2020      Annually/Less than 8% No   Lipid profile : 10/13/2020 Annually Yes   LDL control Lab Results   Component Value Date    LDLCALC 197.2 (H) 10/13/2020    Annually/Less than 100 mg/dl  No   Nephropathy screening Lab Results   Component Value Date    LABMICR 2964.0 06/01/2020     Lab Results   Component Value Date    PROTEINUA 3+ (A) 10/13/2020    Annually Yes   Blood pressure BP Readings from Last 1 Encounters:   12/14/20 (!) 148/91    Less than 140/90 No   Dilated retinal exam : 12/13/2017 Annually No    Foot exam   : 07/21/2020 Annually Yes           Goals of care, counseling/discussion [Z71.89]  Not Applicable    Palliative care encounter [Z51.5]  Not Applicable    Lung transplanted [Z94.2]  Not Applicable    ESRD (end stage renal disease) [N18.6]  Yes    Oxygen dependent [Z99.81]  Not Applicable    ESRD (end stage renal disease) on dialysis [N18.6, Z99.2]  Not Applicable      Resolved Hospital Problems   No resolved problems to display.       Future Appointments   Date Time Provider Department Center   8/8/2023  7:00 AM [facility]   8/9/2023  8:40 AM Michel Henley MD [facility]           I have seen and examined this patient face to face today. My clinical findings that support the need for the home health skilled services and home bound status are the following:  Weakness/numbness causing balance and gait disturbance due to Weakness/Debility making it taxing to leave home.    Allergies:Review of patient's allergies indicates:  No Known Allergies    Diet: regular diet    Activities: activity as tolerated    Nursing:   SN to complete comprehensive assessment including routine vital signs. Instruct on disease process and s/s of complications to report to MD. Review/verify medication list sent home with the patient at time of discharge  and instruct patient/caregiver as needed. Frequency may be adjusted depending on start of care date. If patient has enteral feeding tube (NG, PEG, J-tube, G-tube), flush tube before and after feeding and/or medication administration with 20-30 mL of water.    Notify MD if SBP > 160 or < 90; DBP > 90 or < 50; HR > 120 or < 50; Temp > 101; Other:  oxygen saturations <85%       CONSULTS:    Physical Therapy to evaluate and treat. Evaluate for home safety and equipment needs; Establish/upgrade home exercise program. Perform / instruct on therapeutic exercises, gait training, transfer training, and Range of Motion.  Occupational Therapy to  evaluate and treat. Evaluate home environment for safety and equipment needs. Perform/Instruct on transfers, ADL training, ROM, and therapeutic exercises.  Aide to provide assistance with personal care, ADLs, and vital signs.    MISCELLANEOUS CARE:  Home Oxygen: Is the patient on home oxygen? yes; Instruct on safety precautions in use of oxygen as follows: Oxygen at 2 L/min nasal canula to be used:  Continuously. 2L nasal canula at rest and 3L nasal canula with exertion    WOUND CARE ORDERS  no      Medications: Review discharge medications with patient and family and provide education.      Current Discharge Medication List        CONTINUE these medications which have NOT CHANGED    Details   amLODIPine (NORVASC) 10 MG tablet TAKE 1 TABLET BY MOUTH EVERY DAY  Qty: 90 tablet, Refills: 3    Associated Diagnoses: Hypertension, unspecified type      azithromycin (Z-REYNA) 250 MG tablet Take 1 tablet (250 mg total) by mouth every Mon, Wed, Fri.  Qty: 13 tablet, Refills: 11    Associated Diagnoses: Lung replaced by transplant; Prophylactic antibiotic      calcium carbonate-vitamin D3 (CALCIUM 600 + D,3,) 600 mg-5 mcg (200 unit) Cap Take 1 capsule by mouth 2 (two) times a day.    Associated Diagnoses: Vitamin D insufficiency      carvediloL (COREG) 3.125 MG tablet TAKE 1 TABLET BY MOUTH TWICE A DAY HOLD IF SBP < 130      cholecalciferol, vitamin D3, (VITAMIN D3) 25 mcg (1,000 unit) capsule Take 2 capsules (2,000 Units total) by mouth once daily.  Refills: 0      cinacalcet (SENSIPAR) 30 MG Tab One po QDAY, with largest meal  Qty: 30 tablet, Refills: 11      cloNIDine (CATAPRES) 0.1 MG tablet Take 1 tablet (0.1 mg total) by mouth 3 (three) times daily.  Qty: 90 tablet, Refills: 11    Comments: .      ECOTRIN LOW STRENGTH 81 mg EC tablet TAKE 1 TABLET DAILY  Qty: 2 tablet, Refills: 4    Associated Diagnoses: Lung replaced by transplant      epoetin greer-epbx (RETACRIT) 4,000 unit/mL injection Inject 1.41 mLs (5,640 Units  total) into the skin every Tues, Thurs, Sat. Administered by dialysis unit on T/Th/Sat.      everolimus, immunosuppressive, (ZORTRESS) 0.75 mg tablet Take 2 tablets (1.5 mg total) by mouth every morning AND 1 tablet (0.75 mg total) every evening.  Qty: 90 tablet, Refills: 11    Comments: Txp Date:8/22/2017 (Lung) Disch Date:9/1/2017 ICD10:Z94.2 Txp Location:[place]  Associated Diagnoses: Lung replaced by transplant      famotidine (PEPCID) 20 MG tablet Take 20 mg by mouth once daily.      ferric citrate (AURYXIA) 210 mg iron Tab TAKE 3 TABLETS BY MOUTH 3 TIMES A DAY WITH FOOD  Qty: 810 tablet, Refills: 3    Comments: PA completed 5/10/22. Converting to mail order      folic acid (FOLVITE) 1 MG tablet TAKE 1 TABLET DAILY  Qty: 90 tablet, Refills: 4    Associated Diagnoses: Lung replaced by transplant      furosemide (LASIX) 40 MG tablet One po QDAY  Qty: 90 tablet, Refills: 1    Associated Diagnoses: Encounter for long-term (current) use of medications      insulin aspart U-100 (NOVOLOG FLEXPEN U-100 INSULIN) 100 unit/mL (3 mL) InPn pen Inject 10 Units into the skin 3 (three) times daily with meals.  Qty: 15 mL, Refills: 11    Comments: Plus SSI:  150/25.  Associated Diagnoses: Type 2 diabetes mellitus with hyperglycemia, with long-term current use of insulin      insulin degludec (TRESIBA FLEXTOUCH U-200) 200 unit/mL (3 mL) insulin pen INJECT 20 UNITS INTO THE SKIN EVERY EVENING  Qty: 9 pen, Refills: 4    Associated Diagnoses: Type 2 diabetes mellitus with hyperglycemia, with long-term current use of insulin      lancets Misc To check BG 4 times daily, to use with insurance preferred meter  Qty: 350 each, Refills: 3    Associated Diagnoses: Type 2 diabetes mellitus with hyperglycemia, with long-term current use of insulin      magnesium chloride (MAG 64) 64 mg TbEC Take 2 tablets (128 mg total) by mouth 2 (two) times daily.  Qty: 120 tablet, Refills: 11    Associated Diagnoses: Hypomagnesemia      metoprolol tartrate  "(LOPRESSOR) 25 MG tablet Take 1 tablet (25 mg total) by mouth 2 (two) times daily.  Qty: 180 tablet, Refills: 3    Comments: Replacing carvedilol      ondansetron (ZOFRAN-ODT) 8 MG TbDL Dissolve 1 tablet (8 mg total) by mouth daily as needed (pre-med for IVIG infusions).  Qty: 15 tablet, Refills: 3    Associated Diagnoses: Antibody mediated rejection of lung transplant; Preventive medication therapy needed      ONETOUCH VERIO Strp USE TO CHECK BLOOD GLUCOSE FOUR TIMES A DAY, TO USE WITH INSURANCE PREFERRED METER  Qty: 350 each, Refills: 3    Associated Diagnoses: Type 2 diabetes mellitus with hyperglycemia, with long-term current use of insulin      pantoprazole (PROTONIX) 40 MG tablet Take 1 tablet (40 mg total) by mouth once daily.  Qty: 30 tablet, Refills: 11    Associated Diagnoses: Gastroesophageal reflux disease, unspecified whether esophagitis present      pen needle, diabetic (BD ULTRA-FINE JIM PEN NEEDLE) 32 gauge x 5/32" Ndle Uses 4 times daily, on multiple daily insulin injections  Qty: 350 each, Refills: 3    Associated Diagnoses: Type 2 diabetes mellitus with hyperglycemia, with long-term current use of insulin      predniSONE (DELTASONE) 5 MG tablet TAKE 1 TABLET BY MOUTH EVERY DAY  Qty: 90 tablet, Refills: 4    Associated Diagnoses: Lung replaced by transplant      pulse oximeter (PULSE OXIMETER) device by Apply Externally route 2 (two) times a day. Use twice daily at 8 AM and 3 PM and record the value in MyChart as directed.  Qty: 1 each, Refills: 0    Comments: This is a NO CHARGE item.  Please override price to zero.  DO NOT PRINT.  NORMAL MODE e-PRESCRIBE ONLY.      rosuvastatin (CRESTOR) 10 MG tablet Take 1 tablet (10 mg total) by mouth once daily.  Qty: 30 tablet, Refills: 3      sulfamethoxazole-trimethoprim 400-80mg (BACTRIM,SEPTRA) 400-80 mg per tablet Take 1 tablet by mouth every Mon, Wed, Fri.  Qty: 15 tablet, Refills: 11    Associated Diagnoses: Need for prophylactic antibiotic; Lung " replaced by transplant      tacrolimus (PROGRAF) 0.5 MG Cap Take 4 capsules (2 mg total) by mouth every morning AND 3 capsules (1.5 mg total) every evening.  Qty: 210 capsule, Refills: 11    Comments: Txp Date:8/22/2017 (Lung) Disch Date:9/1/2017 ICD10:Z94.2 Txp Location:[facility]  Associated Diagnoses: Lung replaced by transplant             I certify that this patient is confined to his home and needs intermittent skilled nursing care, physical therapy, and occupational therapy.

## 2023-08-07 NOTE — ASSESSMENT & PLAN NOTE
Underwent BLT on 8/22/2017 complicated by CLAD with chronic hypoxemic respiratory failure.  He was admitted to acute on chronic hypoxemic/hypercarbic respiratory failure due to fluid overload with pneumonia.  Improved to baseline.  Continue OIP and immunosuppression.    Plan for discharge home tomorrow after HD. HH and BiPAP ordered.

## 2023-08-07 NOTE — PROGRESS NOTES
[facility]plant Stepdown  Lung Transplant  Progress Note - Floor    Patient Name: Martin Hendrix Jr.  MRN: [id number]  Admission Date: 7/25/2023  Hospital Length of Stay: 13 days  Post-Operative Day: 2176  Attending Physician: Samantha Bill DO  Primary Care Provider: Michel Henley MD     Subjective:     Interval History: No acute events overnight. OOBTC this AM. On 3L NC. Ambulating hallways. Tolerating diet and having Bms. HD tomorrow with plan to discharge home with HH.     Continuous Infusions:  Scheduled Meds:   aspirin  81 mg Oral Daily    atorvastatin  20 mg Oral Daily    calcium-vitamin D3  1 tablet Oral BID    cinacalcet  30 mg Oral Daily with breakfast    epoetin greer (PROCRIT) injection  2,000 Units Intravenous Every Tues, Thurs, Sat    famotidine  20 mg Oral Daily    folic acid  1,000 mcg Oral Daily    heparin (porcine)  5,000 Units Subcutaneous Q8H    insulin aspart U-100  4 Units Subcutaneous TIDWM    insulin detemir U-100  8 Units Subcutaneous QHS    magnesium chloride  128 mg Oral BID    predniSONE  5 mg Oral Daily    sulfamethoxazole-trimethoprim 400-80mg  1 tablet Oral Every Mon, Wed, Fri    tacrolimus  1.5 mg Oral Daily PM    tacrolimus  2 mg Oral Daily AM    vitamin D  2,000 Units Oral Daily     PRN Meds:acetaminophen, glucagon (human recombinant), glucose, glucose, insulin aspart U-100, morphine, ondansetron, sodium chloride 0.9%    Review of patient's allergies indicates:  No Known Allergies    Review of Systems   Constitutional:  Positive for activity change. Negative for fatigue.   HENT: Negative.     Respiratory:  Positive for shortness of breath. Negative for cough, chest tightness and wheezing.    Gastrointestinal:  Negative for diarrhea, nausea and vomiting.   Endocrine: Negative.    Genitourinary:  Positive for decreased urine volume.   Allergic/Immunologic: Positive for immunocompromised state.   Neurological:  Positive for weakness (due to  deconditioning).   Psychiatric/Behavioral:  Negative for confusion. The patient is nervous/anxious (at times).      Objective:        Physical Exam  Vitals reviewed.   Constitutional:       General: He is awake.      Appearance: He is well-developed.      Interventions: Nasal cannula in place.   HENT:      Head: Normocephalic and atraumatic.   Eyes:      Extraocular Movements: Extraocular movements intact.      Conjunctiva/sclera: Conjunctivae normal.   Cardiovascular:      Rate and Rhythm: Normal rate and regular rhythm.      Heart sounds: Normal heart sounds.   Pulmonary:      Effort: No tachypnea, accessory muscle usage or respiratory distress.      Breath sounds: Decreased breath sounds present. No wheezing or rhonchi.   Abdominal:      General: Bowel sounds are normal.      Palpations: Abdomen is soft.      Tenderness: There is no abdominal tenderness.   Musculoskeletal:         General: Normal range of motion.   Skin:     General: Skin is warm and dry.   Neurological:      Mental Status: He is alert and oriented to person, place, and time.   Psychiatric:         Mood and Affect: Mood normal.         Speech: Speech normal.         Behavior: Behavior is cooperative.         Thought Content: Thought content normal.         Cognition and Memory: Cognition and memory normal.         Judgment: Judgment normal.                Vital Signs (Most Recent):  Temp: 97.6 °F (36.4 °C) (08/07/23 0900)  Pulse: 84 (08/07/23 0900)  Resp: 20 (08/07/23 0900)  BP: (!) 176/84 (08/07/23 0900)  SpO2: 100 % (08/07/23 0900) Vital Signs (24h Range):  Temp:  [97.6 °F (36.4 °C)-98.6 °F (37 °C)] 97.6 °F (36.4 °C)  Pulse:  [] 84  Resp:  [17-30] 20  SpO2:  [99 %-100 %] 100 %  BP: (131-176)/(70-84) 176/84     Weight: 82 kg (180 lb 12.4 oz)  Body mass index is 25.21 kg/m².      Intake/Output Summary (Last 24 hours) at 8/7/2023 1057  Last data filed at 8/7/2023 0003  Gross per 24 hour   Intake 420 ml   Output 0 ml   Net 420 ml          Significant Labs:  CBC:  Recent Labs   Lab 08/05/23  0626   WBC 10.43   RBC 3.03*   HGB 9.1*   HCT 29.1*      MCV 96   MCH 30.0   MCHC 31.3*       BMP:  Recent Labs   Lab 08/05/23  0626      K 4.4      CO2 21*   BUN 70*   CREATININE 9.0*   CALCIUM 9.2        Tacrolimus Levels:  Recent Labs   Lab 08/05/23  0626   TACROLIMUS 3.3*       Microbiology:  Microbiology Results (last 7 days)       ** No results found for the last 168 hours. **            I have reviewed all pertinent labs within the past 24 hours.    Diagnostic Results:  Labs: Reviewed        Assessment/Plan:     * Acute on chronic respiratory failure with hypoxia  Patient with Hypercapnic and Hypoxic Respiratory failure which is Acute on chronic.  he is on home oxygen at 2 LPM. Supplemental oxygen was provided and noted- Oxygen Concentration 2L NC  .   Signs/symptoms of respiratory failure include- tachypnea, increased work of breathing, respiratory distress and use of accessory muscles. Contributing diagnoses includes - Pleural effusion, Pneumonia and complications of lung transplant Labs and images were reviewed. Patient Has recent ABG, which has been reviewed. Will treat underlying causes and adjust management of respiratory failure as follows-     CXR with noted right sided pleural effusion.  Bedside US showed small amount of fluid noted to right side.     --antibiotics stopped  --Continue Bi-PAP at night, currently weaning down oxygen  --RIP negative, aspergillus antigen and CMV PCR negative  --Nephrology following.    --back on baseline oxygen requirements  --plan for discharge home with HH and nightly BiPAP tomorrow after HD    Complication of lung transplant  Patient with known CLAD/JONELLE.  Patient is a DNR.    Lung replaced by transplant  Underwent BLT on 8/22/2017 complicated by CLAD with chronic hypoxemic respiratory failure.  He was admitted to acute on chronic hypoxemic/hypercarbic respiratory failure due to fluid  overload with pneumonia.  Improved to baseline.  Continue OIP and immunosuppression.    Plan for discharge home tomorrow after HD. HH and BiPAP ordered.      Immunosuppression  Continue tacrolimus and prednisone. Evero discontinued due to concerns with evero associated lung injury. Will discharge home on tacrolimus 2 mg BID.     Prophylactic antibiotic  Continue Bactrim    ESRD (end stage renal disease) on dialysis  Per Nephrology.  Patient on T,TH,Sat schedule.    Palliative care encounter  Patient is DNR.  Palliative Care following    Type 2 diabetes mellitus with hyperglycemia, with long-term current use of insulin  Endocrine consulted.  Appreciate na Trejo PA-C  Lung Transplant  [facility]

## 2023-08-07 NOTE — PROGRESS NOTES
[facility]  Nephrology  Progress Note    Patient Name: Martin Hendrix Jr.  MRN: [id number]  Admission Date: 7/25/2023  Hospital Length of Stay: 13 days  Attending Provider: Samantha Bill DO   Primary Care Physician: Michel Henley MD  Principal Problem:Acute on chronic respiratory failure with hypoxia    Subjective:     Interval History: Continues on 3L NC. Plan for discharge tomorrow following HD.     Review of patient's allergies indicates:  No Known Allergies  Current Facility-Administered Medications   Medication Frequency    [START ON 8/8/2023] 0.9%  NaCl infusion Once    acetaminophen tablet 1,000 mg Q6H PRN    aspirin EC tablet 81 mg Daily    atorvastatin tablet 20 mg Daily    calcium-vitamin D3 500 mg-5 mcg (200 unit) per tablet 1 tablet BID    cinacalcet tablet 30 mg Daily with breakfast    epoetin greer injection 2,000 Units Every Tues, Thurs, Sat    famotidine tablet 20 mg Daily    folic acid tablet 1,000 mcg Daily    glucagon (human recombinant) injection 1 mg PRN    glucose chewable tablet 16 g PRN    glucose chewable tablet 24 g PRN    heparin (porcine) injection 5,000 Units Q8H    insulin aspart U-100 pen 1-10 Units QID (AC + HS) PRN    insulin aspart U-100 pen 4 Units TIDWM    insulin detemir U-100 (Levemir) pen 8 Units QHS    magnesium chloride TBEC tablet TbEC 128 mg BID    morphine injection 2 mg Q4H PRN    ondansetron disintegrating tablet 8 mg Q8H PRN    predniSONE tablet 5 mg Daily    sodium chloride 0.9% flush 10 mL PRN    sulfamethoxazole-trimethoprim 400-80mg per tablet 1 tablet Every Mon, Wed, Fri    tacrolimus capsule 1.5 mg Daily PM    tacrolimus capsule 2 mg Daily AM    vitamin D 1000 units tablet 2,000 Units Daily     Facility-Administered Medications Ordered in Other Encounters   Medication Frequency    0.9%  NaCl infusion Continuous    cefazolin (ANCEF) 2 gram in dextrose 5% 50 mL IVPB (premix) On Call Procedure       Objective:      Vital Signs (Most Recent):  Temp: 97.1 °F (36.2 °C) (08/07/23 1104)  Pulse: 97 (08/07/23 1105)  Resp: 20 (08/07/23 1104)  BP: (!) 176/87 (08/07/23 1104)  SpO2: 97 % (08/07/23 1104) Vital Signs (24h Range):  Temp:  [97.1 °F (36.2 °C)-98.6 °F (37 °C)] 97.1 °F (36.2 °C)  Pulse:  [] 97  Resp:  [17-30] 20  SpO2:  [97 %-100 %] 97 %  BP: (131-176)/(70-87) 176/87     Weight: 82 kg (180 lb 12.4 oz) (08/06/23 0639)  Body mass index is 25.21 kg/m².  Body surface area is 2.03 meters squared.    I/O last 3 completed shifts:  In: 670 [P.O.:670]  Out: 0      Physical Exam  Vitals and nursing note reviewed.   Constitutional:       General: He is awake.      Appearance: He is well-developed.      Interventions: Nasal cannula in place.   HENT:      Head: Normocephalic and atraumatic.   Eyes:      Extraocular Movements: Extraocular movements intact.      Conjunctiva/sclera: Conjunctivae normal.   Cardiovascular:      Rate and Rhythm: Normal rate and regular rhythm.      Heart sounds: Normal heart sounds.   Pulmonary:      Effort: No tachypnea, accessory muscle usage or respiratory distress.      Breath sounds: Decreased breath sounds present. No wheezing or rhonchi.   Abdominal:      General: Bowel sounds are normal.      Palpations: Abdomen is soft.      Tenderness: There is no abdominal tenderness.   Musculoskeletal:         General: Normal range of motion.   Skin:     General: Skin is warm and dry.   Neurological:      Mental Status: He is alert and oriented to person, place, and time.   Psychiatric:         Mood and Affect: Mood normal.         Speech: Speech normal.         Behavior: Behavior is cooperative.         Thought Content: Thought content normal.         Cognition and Memory: Cognition and memory normal.         Judgment: Judgment normal.          Significant Labs:  CBC:   Recent Labs   Lab 08/05/23  0626   WBC 10.43   RBC 3.03*   HGB 9.1*   HCT 29.1*      MCV 96   MCH 30.0   MCHC 31.3*     CMP:    Recent Labs   Lab 08/05/23  0626   *   CALCIUM 9.2   ALBUMIN 3.1*   PROT 6.4      K 4.4   CO2 21*      BUN 70*   CREATININE 9.0*   ALKPHOS 102   ALT 19   AST 19   BILITOT 0.5     All labs within the past 24 hours have been reviewed.       Assessment/Plan:     Pulmonary  * Acute on chronic respiratory failure with hypoxia  - defer to primary team     Oxygen dependent  - defer to primary team, oxygenating well on NC    Renal/  ESRD (end stage renal disease) on dialysis  62 y.o. White Male ESRD-HD M-W-F presents to ED on 7/25/2023 with diagnosis of: Bilateral pneumonia [J18.9];Acute on chronic respiratory failure with hypoxia [J96.21]   Nephrology consulted for inpatient ESRD-HD management    ESRD on IHD  TTS  -  Last iHD 7/25  [facility]/ Dr. Zenia Peacock.  Duration of outpatient dialysis session - 3.5 hrs  EDW: 87.5 kg   Access: LUE brachiocephalic fistula, created 10/13/2021 by Dr Thomason    Assessment:   - HD in am (8/8), plan to discharge home tomorrow following HD   - Continue to monitor intake and output  - Please avoid gadolinium, fleets, phos-based laxatives, NSAIDs  - Dialysis thrice weekly unless more urgent indications arise. Will evaluate RRT requirements Daily.    Anemia of ESRD   Recent Labs   Lab 08/03/23  0619 08/04/23  0641 08/05/23  0626   WBC 9.27 10.15 10.43   HGB 10.0* 9.4* 9.1*   HCT 32.2* 31.1* 29.1*    310 351     Lab Results   Component Value Date    FESATURATED 17 (L) 03/14/2022    FERRITIN 1,619 (H) 03/14/2022       - Goal in ESRD is Hgb of 10-11. Hgb 11.3  - Epogen with HD    Mineral Bone Disease in ESRD   Lab Results   Component Value Date    .8 (H) 08/06/2023    CALCIUM 9.2 08/05/2023    ALBUMIN 3.1 (L) 08/05/2023    CAION 1.20 07/25/2023    PHOS 3.9 08/05/2023       - F/U PO4, Mg, Calcium. And albumin levels.   - Renal diet with protein intake goal 1.5 g/kg/d with 1 L fluid restriction when appropriate  - Phos  3.9, no binders  - Continue Cinacalcet   - Daily renal panel so that phos and albumin is monitored daily.           Thank you for your consult. I will follow-up with patient. Please contact us if you have any additional questions.    Zenia Singh DNP  Nephrology  [facility]

## 2023-08-07 NOTE — SUBJECTIVE & OBJECTIVE
Interval History: Continues on 3L NC. Plan for discharge tomorrow following HD.     Review of patient's allergies indicates:  No Known Allergies  Current Facility-Administered Medications   Medication Frequency    [START ON 8/8/2023] 0.9%  NaCl infusion Once    acetaminophen tablet 1,000 mg Q6H PRN    aspirin EC tablet 81 mg Daily    atorvastatin tablet 20 mg Daily    calcium-vitamin D3 500 mg-5 mcg (200 unit) per tablet 1 tablet BID    cinacalcet tablet 30 mg Daily with breakfast    epoetin greer injection 2,000 Units Every Tues, Thurs, Sat    famotidine tablet 20 mg Daily    folic acid tablet 1,000 mcg Daily    glucagon (human recombinant) injection 1 mg PRN    glucose chewable tablet 16 g PRN    glucose chewable tablet 24 g PRN    heparin (porcine) injection 5,000 Units Q8H    insulin aspart U-100 pen 1-10 Units QID (AC + HS) PRN    insulin aspart U-100 pen 4 Units TIDWM    insulin detemir U-100 (Levemir) pen 8 Units QHS    magnesium chloride TBEC tablet TbEC 128 mg BID    morphine injection 2 mg Q4H PRN    ondansetron disintegrating tablet 8 mg Q8H PRN    predniSONE tablet 5 mg Daily    sodium chloride 0.9% flush 10 mL PRN    sulfamethoxazole-trimethoprim 400-80mg per tablet 1 tablet Every Mon, Wed, Fri    tacrolimus capsule 1.5 mg Daily PM    tacrolimus capsule 2 mg Daily AM    vitamin D 1000 units tablet 2,000 Units Daily     Facility-Administered Medications Ordered in Other Encounters   Medication Frequency    0.9%  NaCl infusion Continuous    cefazolin (ANCEF) 2 gram in dextrose 5% 50 mL IVPB (premix) On Call Procedure       Objective:     Vital Signs (Most Recent):  Temp: 97.1 °F (36.2 °C) (08/07/23 1104)  Pulse: 97 (08/07/23 1105)  Resp: 20 (08/07/23 1104)  BP: (!) 176/87 (08/07/23 1104)  SpO2: 97 % (08/07/23 1104) Vital Signs (24h Range):  Temp:  [97.1 °F (36.2 °C)-98.6 °F (37 °C)] 97.1 °F (36.2 °C)  Pulse:  [] 97  Resp:  [17-30] 20  SpO2:  [97 %-100 %] 97 %  BP: (131-176)/(70-87) 176/87     Weight:  82 kg (180 lb 12.4 oz) (08/06/23 0639)  Body mass index is 25.21 kg/m².  Body surface area is 2.03 meters squared.    I/O last 3 completed shifts:  In: 670 [P.O.:670]  Out: 0      Physical Exam  Vitals and nursing note reviewed.   Constitutional:       General: He is awake.      Appearance: He is well-developed.      Interventions: Nasal cannula in place.   HENT:      Head: Normocephalic and atraumatic.   Eyes:      Extraocular Movements: Extraocular movements intact.      Conjunctiva/sclera: Conjunctivae normal.   Cardiovascular:      Rate and Rhythm: Normal rate and regular rhythm.      Heart sounds: Normal heart sounds.   Pulmonary:      Effort: No tachypnea, accessory muscle usage or respiratory distress.      Breath sounds: Decreased breath sounds present. No wheezing or rhonchi.   Abdominal:      General: Bowel sounds are normal.      Palpations: Abdomen is soft.      Tenderness: There is no abdominal tenderness.   Musculoskeletal:         General: Normal range of motion.   Skin:     General: Skin is warm and dry.   Neurological:      Mental Status: He is alert and oriented to person, place, and time.   Psychiatric:         Mood and Affect: Mood normal.         Speech: Speech normal.         Behavior: Behavior is cooperative.         Thought Content: Thought content normal.         Cognition and Memory: Cognition and memory normal.         Judgment: Judgment normal.          Significant Labs:  CBC:   Recent Labs   Lab 08/05/23  0626   WBC 10.43   RBC 3.03*   HGB 9.1*   HCT 29.1*      MCV 96   MCH 30.0   MCHC 31.3*     CMP:   Recent Labs   Lab 08/05/23 0626   *   CALCIUM 9.2   ALBUMIN 3.1*   PROT 6.4      K 4.4   CO2 21*      BUN 70*   CREATININE 9.0*   ALKPHOS 102   ALT 19   AST 19   BILITOT 0.5     All labs within the past 24 hours have been reviewed.

## 2023-08-08 NOTE — DISCHARGE SUMMARY
[facility] - Transplant Stepdown  Lung Transplant  Discharge Summary      Patient Name: Martin Hendrix Jr.  MRN: [id number]  Admission Date: 7/25/2023  Hospital Length of Stay: 14 days  Discharge Date and Time: 08/08/2023 11:00 AM  Attending Physician: Samantha Bill DO   Discharging Provider: Teresa Trejo PA-C  Primary Care Provider: Michel Henley MD     HPI: No notes on file    * No surgery found *     Hospital Course: Plan for discharge home today with HH PT/OT. Continue 2l oxygen with exertion. BiPAP ordered for nightly use. Cultures remained negative. Everolimus discontinued and will discharge tacrolimus 2 mg BID. Follow up in clinic in 2 weeks with CXR, spirometry and routine labs.     * Acute on chronic respiratory failure with hypoxia  Patient with Hypercapnic and Hypoxic Respiratory failure which is Acute on chronic.  he is on home oxygen at 2 LPM. Supplemental oxygen was provided and noted- Oxygen Concentration 2L NC  .   Signs/symptoms of respiratory failure include- tachypnea, increased work of breathing, respiratory distress and use of accessory muscles. Contributing diagnoses includes - Pleural effusion, Pneumonia and complications of lung transplant Labs and images were reviewed. Patient Has recent ABG, which has been reviewed. Will treat underlying causes and adjust management of respiratory failure as follows-      CXR with noted right sided pleural effusion.  Bedside US showed small amount of fluid noted to right side.      --antibiotics stopped  --Continue Bi-PAP at night, currently weaning down oxygen  --RIP negative, aspergillus antigen and CMV PCR negative  --Nephrology following.    --back on baseline oxygen requirements  --plan for discharge home with HH and nightly BiPAP today after HD     Complication of lung transplant  Patient with known CLAD/JONELLE.  Patient is a DNR.     Lung replaced by transplant  Underwent BLT on 8/22/2017 complicated by CLAD with chronic hypoxemic  respiratory failure.  He was admitted to acute on chronic hypoxemic/hypercarbic respiratory failure due to fluid overload with pneumonia.  Improved to baseline.  Continue OIP and immunosuppression.     Plan for discharge home todayafter HD. HH and BiPAP ordered.       Immunosuppression  Continue tacrolimus and prednisone. Evero discontinued due to concerns with evero associated lung injury. Will discharge home on tacrolimus 2 mg BID.      Prophylactic antibiotic  Continue Bactrim     ESRD (end stage renal disease) on dialysis  Per Nephrology.  Patient on T,TH,Sat schedule.     Palliative care encounter  Patient is DNR.  Palliative Care following     Type 2 diabetes mellitus with hyperglycemia, with long-term current use of insulin  Resume home regimen      Goals of Care Treatment Preferences:  Code Status: DNR          What is most important right now is to focus on remaining as independent as possible, improvement in condition but with limits to invasive therapies.  Accordingly, we have decided that the best plan to meet the patient's goals includes continuing with treatment.      Consults (From admission, onward)        Status Ordering Provider     Inpatient consult to SNF  Once        Provider:  (Not yet assigned)    Acknowledged GRACIELA MORENO     Inpatient consult to Palliative Care  Once        Provider:  (Not yet assigned)    Completed ROZ JIMÉNEZ     Inpatient consult to Infectious Diseases  Once        Provider:  (Not yet assigned)    Completed FELIPA HAZEL     Inpatient consult to Nephrology  Once        Provider:  (Not yet assigned)    Completed ROZ JIMÉNEZ     Inpatient consult to Endocrinology  Once        Provider:  (Not yet assigned)    Completed ROZ JIMÉNEZ          Significant Diagnostic Studies: Labs: All labs within the past 24 hours have been reviewed  Microbiology:   Blood Culture   Lab Results   Component Value Date    LABBLOO No growth after 5 days.  02/13/2022    and Sputum Culture   Lab Results   Component Value Date    GSRESP Few Gram positive cocci 02/15/2022    GSRESP Few Gram positive rods 02/15/2022    GSRESP Few Gram negative rods 02/15/2022    GSRESP Rare WBC's 02/15/2022    GSRESP <10 epithelial cells per low power field. 02/15/2022    RESPIRATORYC Normal respiratory mary 02/15/2022    RESPIRATORYC No S aureus or Pseudomonas isolated. 02/15/2022     Radiology: X-Ray: CXR: X-Ray Chest 1 View (CXR):   Results for orders placed or performed during the hospital encounter of 07/25/23   X-Ray Chest 1 View    Narrative    EXAMINATION:  XR CHEST 1 VIEW    CLINICAL HISTORY:  Hypoxic RF; Acute respiratory failure with hypoxia    TECHNIQUE:  Single frontal view of the chest was performed.    COMPARISON:  Chest radiograph performed 07/25/2023, 17:21 hours.    FINDINGS:  Status post median sternotomy.  Cardiac monitoring leads.    Grossly unchanged cardiomediastinal contours.    Patchy opacities both lungs, appear minimally improved relative to 07/25/2023, 17:21 hours examination.    Persistent bilateral pleural effusions similar to slightly decreased in size.    No definite pneumothorax.    Remainder of examination without substantial interval change.      Impression    As above.      Electronically signed by: Matthias Bullock  Date:    07/28/2023  Time:    12:23       Pending Diagnostic Studies:     Procedure Component Value Units Date/Time    Tacrolimus level [[id number]] Collected: 08/08/23 0753    Order Status: Sent Lab Status: In process Updated: 08/08/23 0804    Specimen: Blood         Final Active Diagnoses:    Diagnosis Date Noted POA    PRINCIPAL PROBLEM:  Acute on chronic respiratory failure with hypoxia [J96.21] 07/25/2023 Yes    Complication of lung transplant [T86.819] 08/24/2017 Yes    Lung replaced by transplant [Z94.2] 08/22/2017 Not Applicable    Immunosuppression [D84.9] 08/22/2017 Yes    Prophylactic antibiotic [Z79.2] 08/22/2017 Not  Applicable    ESRD (end stage renal disease) on dialysis [N18.6, Z99.2] 09/14/2021 Not Applicable    Palliative care encounter [Z51.5] 07/26/2023 Not Applicable    Type 2 diabetes mellitus with hyperglycemia, with long-term current use of insulin [E11.65, Z79.4] 11/06/2017 Not Applicable     Chronic    Goals of care, counseling/discussion [Z71.89] 07/27/2023 Not Applicable    Lung transplanted [Z94.2] 07/26/2023 Not Applicable    ESRD (end stage renal disease) [N18.6] 07/26/2023 Yes    Oxygen dependent [Z99.81] 09/23/2021 Not Applicable      Problems Resolved During this Admission:      Discharged Condition: stable    Disposition: Home or Self Care    Medications:  Reconciled Home Medications:      Medication List      CHANGE how you take these medications    tacrolimus 0.5 MG Cap  Commonly known as: PROGRAF  Take 4 capsules (2 mg total) by mouth every 12 (twelve) hours.  What changed: See the new instructions.        CONTINUE taking these medications    amLODIPine 10 MG tablet  Commonly known as: NORVASC  TAKE 1 TABLET BY MOUTH EVERY DAY     AURYXIA 210 mg iron Tab  Generic drug: ferric citrate  TAKE 3 TABLETS BY MOUTH 3 TIMES A DAY WITH FOOD     azithromycin 250 MG tablet  Commonly known as: Z-REYNA  Take 1 tablet (250 mg total) by mouth every Mon, Wed, Fri.     CALCIUM 600 + D(3) 600 mg-5 mcg (200 unit) Cap  Generic drug: calcium carbonate-vitamin D3  Take 1 capsule by mouth 2 (two) times a day.     cholecalciferol (vitamin D3) 25 mcg (1,000 unit) capsule  Commonly known as: VITAMIN D3  Take 2 capsules (2,000 Units total) by mouth once daily.     cinacalcet 30 MG Tab  Commonly known as: SENSIPAR  One po QDAY, with largest meal     ECOTRIN LOW STRENGTH 81 MG EC tablet  Generic drug: aspirin  TAKE 1 TABLET DAILY     epoetin greer-epbx 4,000 unit/mL injection  Commonly known as: RETACRIT  Inject 1.41 mLs (5,640 Units total) into the skin every Tues, Thurs, Sat. Administered by dialysis unit on T/Th/Sat.    "  folic acid 1 MG tablet  Commonly known as: FOLVITE  TAKE 1 TABLET DAILY     insulin aspart U-100 100 unit/mL (3 mL) Inpn pen  Commonly known as: NovoLOG FlexPen U-100 Insulin  Inject 10 Units into the skin 3 (three) times daily with meals.     lancets Misc  To check BG 4 times daily, to use with insurance preferred meter     MAG 64 64 mg Tbec  Generic drug: magnesium chloride  Take 2 tablets (128 mg total) by mouth 2 (two) times daily.     metoprolol tartrate 25 MG tablet  Commonly known as: LOPRESSOR  Take 1 tablet (25 mg total) by mouth 2 (two) times daily.     ondansetron 8 MG Tbdl  Commonly known as: ZOFRAN-ODT  Dissolve 1 tablet (8 mg total) by mouth daily as needed (pre-med for IVIG infusions).     ONETOUCH VERIO TEST STRIPS Strp  Generic drug: blood sugar diagnostic  USE TO CHECK BLOOD GLUCOSE FOUR TIMES A DAY, TO USE WITH INSURANCE PREFERRED METER     pantoprazole 40 MG tablet  Commonly known as: PROTONIX  Take 1 tablet (40 mg total) by mouth once daily.     pen needle, diabetic 32 gauge x 5/32" Ndle  Commonly known as: BD ULTRA-FINE JIM PEN NEEDLE  Uses 4 times daily, on multiple daily insulin injections     predniSONE 5 MG tablet  Commonly known as: DELTASONE  TAKE 1 TABLET BY MOUTH EVERY DAY     pulse oximeter device  Commonly known as: pulse oximeter  by Apply Externally route 2 (two) times a day. Use twice daily at 8 AM and 3 PM and [facility] as directed.     rosuvastatin 10 MG tablet  Commonly known as: CRESTOR  Take 1 tablet (10 mg total) by mouth once daily.     sulfamethoxazole-trimethoprim 400-80mg 400-80 mg per tablet  Commonly known as: BACTRIM,SEPTRA  Take 1 tablet by mouth every Mon, Wed, Fri.     TRESIBA FLEXTOUCH U-200 200 unit/mL (3 mL) insulin pen  Generic drug: insulin degludec  INJECT 20 UNITS INTO THE SKIN EVERY EVENING        STOP taking these medications    carvediloL 3.125 MG tablet  Commonly known as: COREG     cloNIDine 0.1 MG tablet  Commonly known as: " CATAPRES     everolimus (immunosuppressive) 0.75 mg tablet  Commonly known as: ZORTRESS     famotidine 20 MG tablet  Commonly known as: PEPCID     furosemide 40 MG tablet  Commonly known as: LASIX          Patient Instructions:      BIPAP FOR HOME USE   Order Comments: ABG - pH 7.241 PCO2 - 59.2 PaO2 - 104 HCO3 - 25.6    Bleed BiPAP with oxygen at 2L     Order Specific Question Answer Comments   Length of need (1-99 months): 99    Type:  BiPap    BiPap setting (cmH20) Inspiratory: 18    BiPap setting (cmH20) Expiratory: 5    Humidification (): Heated    Choose ONE mask type and its corresponding cushions and/or pillows:  Full Face Mask, 1 per 90 days:  Full Face Cushion, (3 per 90 days)    Choose EITHER Heated or Non-Heated Tubjing  Heated Tubing, 1 per 6 months    All other supplies as needed as listed below:  Headgear, 1 per 180 days    All other supplies as needed as listed below:  Chin Strap, 1 per 180 days    All other supplies as needed as listed below:  Disposable Filter, 6 per 90 days    All other supplies as needed as listed below:  Exhalation Port, contact payer for quantity/frequency    All other supplies as needed as listed below:  Humidifier Chamber, 1 per 180 days      Diet Adult Regular     Notify your health care provider if you experience any of the following:     Notify your health care provider if you experience any of the following:  increased confusion or weakness     Notify your health care provider if you experience any of the following:  persistent dizziness, light-headedness, or visual disturbances     Notify your health care provider if you experience any of the following:  worsening rash     Notify your health care provider if you experience any of the following:  severe persistent headache     Notify your health care provider if you experience any of the following:  difficulty breathing or increased cough     Notify your health care  provider if you experience any of the following:  redness, tenderness, or signs of infection (pain, swelling, redness, odor or green/yellow discharge around incision site)     Notify your health care provider if you experience any of the following:  severe uncontrolled pain     Notify your health care provider if you experience any of the following:  persistent nausea and vomiting or diarrhea     Notify your health care provider if you experience any of the following:  temperature >100.4     Activity as tolerated     Follow Up:   Follow-up Information     Samantha Bill, DO Follow up in 2 week(s).    Specialties: Transplant, Pulmonary Disease, Critical Care Medicine  Why: Routine follow up after hospital discharge  Contact information:  [address]  [facility] [address]  [phone]                         Teresa Trejo PA-C  Lung Transplant  [facility] - Transplant Stepdown

## 2023-08-08 NOTE — PLAN OF CARE
Patient AAO. Independent.  Ambulating in the hallway today with O2.  Patient remains on 1-3L NC.  Patient up in chair all night   Anuric and had one BM.    Skin intact.  BG  184 at bedtime - supplemental insulin given.   Patient  systolic - lopressor held.   Patient eating > 50% of meals.    Patient to have HD this am  - left fistula (+)/ (+)  and dc home with HH. Wore bipap approx 2 hours.

## 2023-08-08 NOTE — PROGRESS NOTES
Patient transported off unit via wheelchair to HD. Transferred from chair to wheelchair independently. Oxygen increased from 1L NC to 3L NC per patient request with exertion. Spoke with EVELIO Knutson in HD - RN to draw labs and administer Prograf after labs drawn.

## 2023-08-08 NOTE — PT/OT/SLP PROGRESS
Physical Therapy Treatment    Patient Name:  Martin Hendrix Jr.   MRN:  [id number]    Recommendations:     Discharge Recommendations: home health PT  Discharge Equipment Recommendations:  (pt did well amb without AD today, has home O2)  Barriers to discharge: None    Assessment:     Martin Hendrix Jr. is a 62 y.o. male admitted with a medical diagnosis of Acute on chronic respiratory failure with hypoxia.  He presents with the following impairments/functional limitations: weakness, impaired endurance, impaired self care skills, impaired functional mobility, impaired cardiopulmonary response to activity ;pt with good mobility today, amb around room w/ Supervision only, O2@2-3L, 90-99%.    Rehab Prognosis: Good; patient would benefit from acute skilled PT services to address these deficits and reach maximum level of function.    Recent Surgery: * No surgery found *      Plan:     During this hospitalization, patient to be seen 3 x/week to address the identified rehab impairments via therapeutic activities, therapeutic exercises, gait training, neuromuscular re-education and progress toward the following goals:    Plan of Care Expires:  08/27/23    Subjective     Chief Complaint: some SOB w/ standing ex's  Patient/Family Comments/goals: pt agreeable to session, sister present in room. Pt reports going home today.  Pain/Comfort:  Pain Rating 1: 0/10      Objective:     Communicated with nurse prior to session.  Patient found  up in bathroom  with telemetry, peripheral IV, oxygen upon PT entry to room.     General Precautions: Standard, fall, diabetic  Orthopedic Precautions: N/A  Braces: N/A  Respiratory Status: Nasal cannula, flow 2 L/min     Functional Mobility:  Transfers:     Sit to Stand:  supervision with no AD  Gait: amb'd ~70' w/ Supervision, O2@2-3L.       AM-PAC 6 CLICK MOBILITY  Turning over in bed (including adjusting bedclothes, sheets and blankets)?: 3  Sitting down on and standing up from a chair with arms  (e.g., wheelchair, bedside commode, etc.): 4  Moving from lying on back to sitting on the side of the bed?: 3  Moving to and from a bed to a chair (including a wheelchair)?: 4  Need to walk in hospital room?: 4  Climbing 3-5 steps with a railing?: 3  Basic Mobility Total Score: 21       Treatment & Education:  Pt perf'd standing LE ex's of heelraises, hip abd, hs curls, hip flex x 10 ea. Attempted mini squats x 3-4 reps, though pt w/ inc SOB, so ret'd to sitting.     Patient left up in chair with all lines intact, call button in reach, and sister present..    GOALS:   Multidisciplinary Problems       Physical Therapy Goals          Problem: Physical Therapy    Goal Priority Disciplines Outcome Goal Variances Interventions   Physical Therapy Goal     PT, PT/OT Ongoing, Progressing     Description: Goals to be met by: 8/15/2023    Patient will increase functional independence with mobility by performin. Supine to sit with Supervision - met   2. Sit to stand transfer with Supervision using LRAD  3. Gait  x 50 feet with Supervision using LRAD.                          Time Tracking:     PT Received On: 23  PT Start Time: 1255     PT Stop Time: 1312  PT Total Time (min): 17 min     Billable Minutes: Therapeutic Activity 17    Treatment Type: Treatment  PT/PTA: PTA     Number of PTA visits since last PT visit: 2023

## 2023-08-08 NOTE — PLAN OF CARE
Problem: Physical Therapy  Goal: Physical Therapy Goal  Description: Goals to be met by: 8/15/2023    Patient will increase functional independence with mobility by performin. Supine to sit with Supervision - met   2. Sit to stand transfer with Supervision using LRAD-met   3. Gait  x 50 feet with Supervision using LRAD. -met     Outcome: Met   Pt amb'd in room ~70' w/ no AD, w/ Supervision, O2@2-3L. Recommend HHPT

## 2023-08-08 NOTE — PROGRESS NOTES
Dialysis started to LFA fistula with 15 gauge needles.  Tolerated well    Prograf given after labs drawn.

## 2023-08-08 NOTE — PROGRESS NOTES
3.5 hour dialysis complete.  Blood returned.  Lorraine pulled from LFA fistula.  Pressure held x7 minutes.  +thrill +bruit.    Net UF 2.5L.  Epo given.  Tolerated well.    Transported from [facility] to room 72731 via w/c by transporter with cell phone in hand.

## 2023-08-08 NOTE — HOSPITAL COURSE
Plan for discharge home today with HH PT/OT. Continue 2l oxygen with exertion. BiPAP ordered for nightly use. Cultures remained negative. Everolimus discontinued and will discharge tacrolimus 2 mg BID. Follow up in clinic in 2 weeks with CXR, spirometry and routine labs.     * Acute on chronic respiratory failure with hypoxia  Patient with Hypercapnic and Hypoxic Respiratory failure which is Acute on chronic.  he is on home oxygen at 2 LPM. Supplemental oxygen was provided and noted- Oxygen Concentration 2L NC  .   Signs/symptoms of respiratory failure include- tachypnea, increased work of breathing, respiratory distress and use of accessory muscles. Contributing diagnoses includes - Pleural effusion, Pneumonia and complications of lung transplant Labs and images were reviewed. Patient Has recent ABG, which has been reviewed. Will treat underlying causes and adjust management of respiratory failure as follows-      CXR with noted right sided pleural effusion.  Bedside US showed small amount of fluid noted to right side.      --antibiotics stopped  --Continue Bi-PAP at night, currently weaning down oxygen  --RIP negative, aspergillus antigen and CMV PCR negative  --Nephrology following.    --back on baseline oxygen requirements  --plan for discharge home with HH and nightly BiPAP today after HD     Complication of lung transplant  Patient with known CLAD/JONELLE.  Patient is a DNR.     Lung replaced by transplant  Underwent BLT on 8/22/2017 complicated by CLAD with chronic hypoxemic respiratory failure.  He was admitted to acute on chronic hypoxemic/hypercarbic respiratory failure due to fluid overload with pneumonia.  Improved to baseline.  Continue OIP and immunosuppression.     Plan for discharge home todayafter HD. HH and BiPAP ordered.       Immunosuppression  Continue tacrolimus and prednisone. Evero discontinued due to concerns with evero associated lung injury. Will discharge home on tacrolimus 2 mg BID.       Prophylactic antibiotic  Continue Bactrim     ESRD (end stage renal disease) on dialysis  Per Nephrology.  Patient on T,TH,Sat schedule.     Palliative care encounter  Patient is DNR.  Palliative Care following     Type 2 diabetes mellitus with hyperglycemia, with long-term current use of insulin  Resume home regimen

## 2023-08-08 NOTE — PROGRESS NOTES
INPATIENT HEMODIALYSIS NOTE  Patient evaluated while undergoing hemodialysis indicated for ESRD. Tolerating session with current UFR, no complications.     ESRD, dialysis regimen:  Questionnaire    Question Answer   Antibiotics on HD? No   Duration of Treatment 3.5 hours   Dialyzer F160   Dialysate Temperature (C) 36.5    mL/min    mL/min   K+ Potassium per Protocol   Ca++ Calcium per Protocol   Na+ Sodium per Protocol   Bicarb Bicarbonate per Protocol   Access Other (please specify)   Fluid Removal (L) 2.5L   Fluid Removal Instructions maintain SBP > 90 mmHG   Dialysate Bath Solution Protocol (DO NOT MODIFY ANSWER) [email or web address] Dialysate Bath Solution Algorithm (formatted with date).pdf       HTN   BP Readings from Last 3 Encounters:   08/08/23 132/84   07/25/23 105/63   06/07/23 (!) 146/91     - UF goal: 2.5L  - Continue current anti-HTN regimen    Anemia:   Lab Results   Component Value Date    HGB 8.9 (L) 08/08/2023    IRON 57 03/14/2022    TRANSFERRIN 224 03/14/2022    TRANSFERRIN 224 03/14/2022    TIBC 332 03/14/2022    FESATURATED 17 (L) 03/14/2022      Based on current lab  - Continue EPO therapy.  - Will repeat iron study  - Transfusion as needed per primary team    Hyperphosphatemia:   Lab Results   Component Value Date    .8 (H) 08/06/2023    CALCIUM 9.2 08/08/2023    CAION 1.20 07/25/2023    PHOS 4.6 (H) 08/08/2023    PHOS 3.9 08/05/2023     - Low phos diet  - Not on phos binder

## 2023-08-08 NOTE — PROGRESS NOTES
Discharge Note:    SW spoke with pt to assess needs for discharge. Pt scheduled to discharge to home with home health via [facility] (ph: [phone]) and [facility] via [facility] (ph: [phone]). SW reviewed discharge plan with pt. Pt aware of and involved in discharge plan. Bipap has been delivered to bedside and first HH visit is scheduled to be Thursday, August 10. Pt to discharge home with the assistance of daughter. Pt reports his daughter has brought his oxygen for discharge. Pt reports coping adequately with discharge at this time and verbalized no additional needs or concerns at this time.  Contact information provided. SW to remain available.

## 2023-08-08 NOTE — CARE UPDATE
-Glucose Goal 140-180     -A1C:           Hemoglobin A1C   Date Value Ref Range Status   07/25/2023 5.6 4.0 - 5.6 % Final       Comment:       ADA Screening Guidelines:  5.7-6.4%  Consistent with prediabetes  >or=6.5%  Consistent with diabetes     High levels of fetal hemoglobin interfere with the HbA1C  assay. Heterozygous hemoglobin variants (HbS, HgC, etc)do  not significantly interfere with this assay.   However, presence of multiple variants may affect accuracy.               -HOME REGIMEN: Tresiba 20 unit HS & Humalog 10u AC         -NO HYPOGYCEMIAS NOTED      - Diet  Diet diabetic [facility]; 2000 Calorie     T2DM.  Hx of lung transplant w/ CLAD.   BG mostly stable with some mild elevations. Prednisone 5 mg.  Will continue to monitor on current regimen     - Levemir 8 units nightly   - Novolog 4 units AC  - Continue MDC  - POCT Glucose before meals and at bedtime  - Hypoglycemia protocol in place      ** Please notify Endocrine for any change and/or advance in diet**  ** Please call Endocrine for any BG related issues **     Discharge Planning:   Continue current inpatient regimen

## 2023-08-08 NOTE — PROGRESS NOTES
Discharge instructions given to and reviewed with patient. Reviewed upcoming appointments and when to call MD/coordinator. Awaiting updated blue card per Ph.D. Already seen by coordinator. Sister at bedside - home oxygen in sister's car. Will request wheelchair/oxygen per transport when patient seen by pharmacist. Prescription already delivered to bedside.

## 2023-08-08 NOTE — PLAN OF CARE
Patient remaining free from injury today and ambulating in the hallway independently.  Patient remains on 1-3L NC.  Patient does not appear to be short of breath at rest.  Patient up in chair all day.  Patient anuric and had one BM.  Patient denies pain.  No skin breakdown noted.  -330.  Meal and supplemental insulin given.  Patient displays appropriate coping and sister at bedside.  Patient BP elevated today and Lopressor added to medication regiment.  Patient eating > 50% of meals.  No s/s of infection at this time.  Patient to have HD in the am and dc home with HH.

## 2023-08-08 NOTE — PROGRESS NOTES
Patient will be discharged home today. Reviewed the discharge plan and Dr. Bill's instructions with the patient and his sister as follows:  Medications: Thony Lyles, Donte, will meet with them to review home medications and provide an updated blue medication card. When asked, the patient stated his preference to send prescriptions to the [facility] outpatient pharmacy with the bedside delivery option.  Oxygen: Per Dr. Bill, the patient should use Oxygen 1-2 lpm at rest and 3 lpm with exertion/activity, with a goal to keep his sats at 90% or higher. For travel home, the patient's sister reported having portable Oxygen supplies in her vehicle. The patient added that he will call [facility] following discharge to schedule a maintenance check on his portable O2 concentrator, adding he has portable tanks and a home O2 concentrator to use at this time.   BiPAP for nighttime use: The patient reported that the equipment was delivered to bedside yesterday and his sister added that she brought everything to the patient's house last evening.  Home health: Services including intermittent nursing visits, nursing aide assistance, OT, and PT arranged through [facility], with the first visit scheduled for 8/10/23.  Return to clinic for a chest xray, fasting lab work, spirometry and a lung transplant clinic visit in 2 weeks. The patient agreed with the appointments being scheduled on Wednesday, 8/23/23.   The patient and his sister asked questions, which were answered to their satisfaction. Both repeated the discharge plan correctly and demonstrated their readiness for discharge.

## 2023-08-09 PROBLEM — Z09 HOSPITAL DISCHARGE FOLLOW-UP: Status: ACTIVE | Noted: 2023-01-01

## 2023-08-09 PROBLEM — U07.1 COVID-19: Status: RESOLVED | Noted: 2022-02-13 | Resolved: 2023-01-01

## 2023-08-09 PROBLEM — R00.1 BRADYCARDIA: Status: ACTIVE | Noted: 2023-01-01

## 2023-08-09 NOTE — PATIENT INSTRUCTIONS
No follow-ups on file.     Dear patient,   As a result of recent federal legislation (The Federal Cures Act), you may receive lab or pathology results from your visit in your [facility] account before your physician is able to contact you. Your physician or their representative will relay the results to you with their recommendations at their soonest availability.     If no improvement in symptoms or symptoms worsen, please be advised to call MD, follow-up at clinic and/or go to ER if becomes severe.    Michel Henley M.D.        We Offer TELEHEALTH & Same Day Appointments!   Book your Telehealth appointment with me through my nurse or   Clinic appointments on [email or web address]!    [address] [place] [address]    Office: [phone]   FAX: [phone]    Check out my Facebook Page and Follow Me at: [email or web address]    Check out my website at [employer] by clicking on: [email or web address]    To Schedule appointments online, go to [email or web address]: [email or web address]

## 2023-08-09 NOTE — ASSESSMENT & PLAN NOTE
Referral to Cardiology for further evaluation and treatment.  I have reduced his metoprolol 12.5 milligrams.  ER precautions for severe symptoms.

## 2023-08-09 NOTE — ASSESSMENT & PLAN NOTE
Patient initially triaged with heart rate of 37.  Re-evaluation after 5 minutes of rest shows heart rate of 67.  Patient reports that he did take metoprolol 25 milligrams prior to coming to the clinic earlier in the morning.  Patient was started on this medication at recent hospitalization.  Patient has follow-up with specialist and labs soon.  Home health is supposed to be set up on Friday.  Referral to Cardiology has been placed.  Transitional Care Note    Family and/or Caretaker present at visit?  No.  Diagnostic tests reviewed/disposition: I have reviewed all completed as well as pending diagnostic tests at the time of discharge.  Disease/illness education: [facility]  Home health/community services discussion/referrals: Patient does not have home health established from hospital visit.  They do need home health.  If needed, we will set up home health for the patient. Orders placed inpatient.  Establishment or re-establishment of referral orders for community resources: No other necessary community resources.   Discussion with other health care providers: No discussion with other health care providers necessary.

## 2023-08-09 NOTE — PROGRESS NOTES
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  Re-evaluation after 5 minutes of rest shows heart rate of 67.  Patient reports that he did take metoprolol 25 milligrams prior to coming to the clinic earlier in the morning.  Patient was started on this medication at recent hospitalization.  Patient has follow-up with specialist and labs soon.  Home health is supposed to be set up on Friday.  Referral to Cardiology has been placed.  Transitional Care Note    Family and/or Caretaker present at visit?  No.  Diagnostic tests reviewed/disposition: I have reviewed all completed as well as pending diagnostic tests at the time of discharge.  Disease/illness education: [facility]  Home health/community services discussion/referrals: Patient does not have home health established from hospital visit.  They do need home health.  If needed, we will set up home health for the patient. Orders placed inpatient.  Establishment or re-establishment of referral orders for community resources: No other necessary community resources.   Discussion with other health care providers: No discussion with other health care providers necessary.                Bradycardia     Referral to Cardiology for further evaluation and treatment.  I have reduced his metoprolol 12.5 milligrams.  ER precautions for severe symptoms.         Relevant Medications    metoprolol tartrate (LOPRESSOR) 25 MG tablet    Other Relevant Orders    IN OFFICE EKG 12-LEAD (to [place])    Ambulatory referral/consult to Cardiology     Future Appointments       Date Provider Specialty Appt Notes    8/10/2023 Maty Ricks MD Cardiology .    8/23/2023  Radiology Bilateral lung transplant    8/23/2023  Lab Post-lung transplant labs    8/23/2023  Pulmonology Post-lung transplant patient    8/23/2023 Robina Mcdonald MD Transplant Hospital discharge follow  "up           Medication Management for assessment above:   Medication List with Changes/Refills   Current Medications    AZITHROMYCIN (Z-REYNA) 250 MG TABLET    Take 1 tablet (250 mg total) by mouth every Mon, Wed, Fri.    CALCIUM CARBONATE-VITAMIN D3 (CALCIUM 600 + D,3,) 600 MG-5 MCG (200 UNIT) CAP    Take 1 capsule by mouth 2 (two) times a day.    CHOLECALCIFEROL, VITAMIN D3, (VITAMIN D3) 25 MCG (1,000 UNIT) CAPSULE    Take 2 capsules (2,000 Units total) by mouth once daily.    CINACALCET (SENSIPAR) 30 MG TAB    One po QDAY, with largest meal    ECOTRIN LOW STRENGTH 81 MG EC TABLET    TAKE 1 TABLET DAILY    EPOETIN RAEANN-EPBX (RETACRIT) 4,000 UNIT/ML INJECTION    Inject 1.41 mLs (5,640 Units total) into the skin every Tues, Thurs, Sat. Administered by dialysis unit on T/Th/Sat.    FERRIC CITRATE (AURYXIA) 210 MG IRON TAB    TAKE 3 TABLETS BY MOUTH 3 TIMES A DAY WITH FOOD    FOLIC ACID (FOLVITE) 1 MG TABLET    TAKE 1 TABLET DAILY    INSULIN ASPART U-100 (NOVOLOG FLEXPEN U-100 INSULIN) 100 UNIT/ML (3 ML) INPN PEN    Inject 10 Units into the skin 3 (three) times daily with meals.    INSULIN DEGLUDEC (TRESIBA FLEXTOUCH U-200) 200 UNIT/ML (3 ML) INSULIN PEN    INJECT 20 UNITS INTO THE SKIN EVERY EVENING    LANCETS MISC    To check BG 4 times daily, to use with insurance preferred meter    MAGNESIUM CHLORIDE (MAG 64) 64 MG TBEC    Take 2 tablets (128 mg total) by mouth 2 (two) times daily.    ONDANSETRON (ZOFRAN-ODT) 8 MG TBDL    Dissolve 1 tablet (8 mg total) by mouth daily as needed (pre-med for IVIG infusions).    ONETOUCH VERIO STRP    USE TO CHECK BLOOD GLUCOSE FOUR TIMES A DAY, TO USE WITH INSURANCE PREFERRED METER    PANTOPRAZOLE (PROTONIX) 40 MG TABLET    Take 1 tablet (40 mg total) by mouth once daily.    PEN NEEDLE, DIABETIC (BD ULTRA-FINE JIM PEN NEEDLE) 32 GAUGE X 5/32" NDLE    Uses 4 times daily, on multiple daily insulin injections    PREDNISONE (DELTASONE) 5 MG TABLET    TAKE 1 TABLET BY MOUTH EVERY DAY    " PULSE OXIMETER (PULSE OXIMETER) DEVICE    by Apply Externally route 2 (two) times a day. Use twice daily at 8 AM and 3 PM and record the value in [employer] as directed.    ROSUVASTATIN (CRESTOR) 10 MG TABLET    Take 1 tablet (10 mg total) by mouth once daily.    SULFAMETHOXAZOLE-TRIMETHOPRIM 400-80MG (BACTRIM,SEPTRA) 400-80 MG PER TABLET    Take 1 tablet by mouth every Mon, Wed, Fri.    TACROLIMUS (PROGRAF) 0.5 MG CAP    Take 4 capsules (2 mg total) by mouth every 12 (twelve) hours.   Changed and/or Refilled Medications    Modified Medication Previous Medication    METOPROLOL TARTRATE (LOPRESSOR) 25 MG TABLET metoprolol tartrate (LOPRESSOR) 25 MG tablet       Take 0.5 tablets (12.5 mg total) by mouth 2 (two) times daily.    Take 1 tablet (25 mg total) by mouth 2 (two) times daily.       Michel Henley M.D.  ==========================================================================  Subjective:   Patient ID: Martin Hendrix Jr. is a 62 y.o. male.  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   Chief Complaint: Follow-up      Problem List Items Addressed This Visit       Lung replaced by transplant    Overview     - Established with transplant speciality   - Patient status post lung transplant x2 (2017)  - Patient is on Prograf and everolimus         Hospital discharge follow-up - Primary    Overview     [facility] - Transplant Stepdown  Lung Transplant  Discharge Summary        Patient Name: Martin Hendrix Jr.  MRN:  [id number]  Admission Date: 7/25/2023  Hospital Length of Stay: 14 days  Discharge Date and Time: 08/08/2023 11:00 AM  Attending Physician: Samantha Bill DO   Discharging Provider: Teresa Trejo PA-C  Primary Care Provider: Michel Henley MD      HPI: No notes on file     * No surgery found *      Hospital Course: Plan for discharge home today with HH PT/OT. Continue 2l oxygen with exertion. BiPAP ordered for nightly use. Cultures remained negative. Everolimus discontinued and will discharge tacrolimus 2 mg BID. Follow up in clinic in 2 weeks with CXR, spirometry and routine labs.      * Acute on chronic respiratory failure with hypoxia  Patient with Hypercapnic and Hypoxic Respiratory failure which is Acute on chronic.  he is on home oxygen at 2 LPM. Supplemental oxygen was provided and noted- Oxygen Concentration 2L NC  .   Signs/symptoms of respiratory failure include- tachypnea, increased work of breathing, respiratory distress and use of accessory muscles. Contributing diagnoses includes - Pleural effusion, Pneumonia and complications of lung transplant Labs and images were reviewed. Patient Has recent ABG, which has been reviewed. Will treat underlying causes and adjust management of respiratory failure as follows-      CXR with noted right sided pleural effusion.  Bedside US showed small amount of fluid noted to right side.      --antibiotics stopped  --Continue Bi-PAP at night, currently weaning down oxygen  --RIP negative, aspergillus antigen and CMV PCR negative  --Nephrology following.    --back on baseline oxygen requirements  --plan for discharge home with HH and nightly BiPAP today after HD     Complication of lung transplant  Patient with known CLAD/JONELLE.  Patient is a DNR.     Lung replaced by transplant  Underwent BLT on 8/22/2017 complicated by CLAD with chronic hypoxemic respiratory failure.  He was admitted to acute on chronic hypoxemic/hypercarbic respiratory failure due to fluid  overload with pneumonia.  Improved to baseline.  Continue OIP and immunosuppression.     Plan for discharge home todayafter HD. HH and BiPAP ordered.       Immunosuppression  Continue tacrolimus and prednisone. Evero discontinued due to concerns with evero associated lung injury. Will discharge home on tacrolimus 2 mg BID.      Prophylactic antibiotic  Continue Bactrim     ESRD (end stage renal disease) on dialysis  Per Nephrology.  Patient on T,TH,Sat schedule.     Palliative care encounter  Patient is DNR.  Palliative Care following     Type 2 diabetes mellitus with hyperglycemia, with long-term current use of insulin  Resume home regimen        Goals of Care Treatment Preferences:  Code Status: DNR     What is most important right now is to focus on remaining as independent as possible, improvement in condition but with limits to invasive therapies.  Accordingly, we have decided that the best plan to meet the patient's goals includes continuing with treatment.               Consults (From admission, onward)         Status Ordering Provider       Inpatient consult to SNF  Once        Provider:  (Not yet assigned)    Acknowledged GRACIELA MORENO       Inpatient consult to Palliative Care  Once        Provider:  (Not yet assigned)    Completed ROZ JIMÉNEZ       Inpatient consult to Infectious Diseases  Once        Provider:  (Not yet assigned)    Completed FELIPA HAZEL       Inpatient consult to Nephrology  Once        Provider:  (Not yet assigned)    Completed ROZ JIMÉNEZ       Inpatient consult to Endocrinology  Once        Provider:  (Not yet assigned)    Completed ROZ JIMÉNEZ             Significant Diagnostic Studies: Labs: All labs within the past 24 hours have been reviewed  Microbiology:   Blood Culture         Lab Results   Component Value Date     LABBLOO No growth after 5 days. 02/13/2022    and Sputum Culture         Lab Results   Component Value Date     GSRESP Few  Gram positive cocci 02/15/2022     GSRESP Few Gram positive rods 02/15/2022     GSRESP Few Gram negative rods 02/15/2022     GSRESP Rare WBC's 02/15/2022     GSRESP <10 epithelial cells per low power field. 02/15/2022     RESPIRATORYC Normal respiratory mary 02/15/2022     RESPIRATORYC No S aureus or Pseudomonas isolated. 02/15/2022      Radiology: X-Ray: CXR: X-Ray Chest 1 View (CXR):       Results for orders placed or performed during the hospital encounter of 07/25/23   X-Ray Chest 1 View     Narrative     EXAMINATION:  XR CHEST 1 VIEW     CLINICAL HISTORY:  Hypoxic RF; Acute respiratory failure with hypoxia     TECHNIQUE:  Single frontal view of the chest was performed.     COMPARISON:  Chest radiograph performed 07/25/2023, 17:21 hours.     FINDINGS:  Status post median sternotomy.  Cardiac monitoring leads.     Grossly unchanged cardiomediastinal contours.     Patchy opacities both lungs, appear minimally improved relative to 07/25/2023, 17:21 hours examination.     Persistent bilateral pleural effusions similar to slightly decreased in size.     No definite pneumothorax.     Remainder of examination without substantial interval change.        Impression     As above.        Electronically signed by:       Matthias Bullock  Date:                                        07/28/2023  Time:                                       12:23                 Pending Diagnostic Studies:      Procedure Component Value Units Date/Time     Tacrolimus level [[id number]] Collected: 08/08/23 0753     Order Status: Sent Lab Status: In process Updated: 08/08/23 0804     Specimen: Blood                   Final Active Diagnoses:     Diagnosis Date Noted POA    PRINCIPAL PROBLEM:  Acute on chronic respiratory failure with hypoxia [J96.21] 07/25/2023 Yes    Complication of lung transplant [T86.819] 08/24/2017 Yes    Lung replaced by transplant [Z94.2] 08/22/2017 Not Applicable    Immunosuppression [D84.9] 08/22/2017 Yes    Prophylactic  antibiotic [Z79.2] 08/22/2017 Not Applicable    ESRD (end stage renal disease) on dialysis [N18.6, Z99.2] 09/14/2021 Not Applicable    Palliative care encounter [Z51.5] 07/26/2023 Not Applicable    Type 2 diabetes mellitus with hyperglycemia, with long-term current use of insulin [E11.65, Z79.4] 11/06/2017 Not Applicable       Chronic    Goals of care, counseling/discussion [Z71.89] 07/27/2023 Not Applicable    Lung transplanted [Z94.2] 07/26/2023 Not Applicable    ESRD (end stage renal disease) [N18.6] 07/26/2023 Yes    Oxygen dependent [Z99.81] 09/23/2021 Not Applicable       Problems Resolved During this Admission:      Discharged Condition: stable     Disposition: Home or Self Care     Medications:  Reconciled Home Medications:       Medication List       CHANGE how you take these medications    tacrolimus 0.5 MG Cap  Commonly known as: PROGRAF  Take 4 capsules (2 mg total) by mouth every 12 (twelve) hours.  What changed: See the new instructions.          CONTINUE taking these medications    amLODIPine 10 MG tablet  Commonly known as: NORVASC  TAKE 1 TABLET BY MOUTH EVERY DAY      AURYXIA 210 mg iron Tab  Generic drug: ferric citrate  TAKE 3 TABLETS BY MOUTH 3 TIMES A DAY WITH FOOD      azithromycin 250 MG tablet  Commonly known as: Z-REYNA  Take 1 tablet (250 mg total) by mouth every Mon, Wed, Fri.      CALCIUM 600 + D(3) 600 mg-5 mcg (200 unit) Cap  Generic drug: calcium carbonate-vitamin D3  Take 1 capsule by mouth 2 (two) times a day.      cholecalciferol (vitamin D3) 25 mcg (1,000 unit) capsule  Commonly known as: VITAMIN D3  Take 2 capsules (2,000 Units total) by mouth once daily.      cinacalcet 30 MG Tab  Commonly known as: SENSIPAR  One po QDAY, with largest meal      ECOTRIN LOW STRENGTH 81 MG EC tablet  Generic drug: aspirin  TAKE 1 TABLET DAILY      epoetin greer-epbx 4,000 unit/mL injection  Commonly known as: RETACRIT  Inject 1.41 mLs (5,640 Units total) into the skin every Tues, Thurs, Sat.  "Administered by dialysis unit on T/Th/Sat.      folic acid 1 MG tablet  Commonly known as: FOLVITE  TAKE 1 TABLET DAILY      insulin aspart U-100 100 unit/mL (3 mL) Inpn pen  Commonly known as: NovoLOG FlexPen U-100 Insulin  Inject 10 Units into the skin 3 (three) times daily with meals.      lancets Misc  To check BG 4 times daily, to use with insurance preferred meter      MAG 64 64 mg Tbec  Generic drug: magnesium chloride  Take 2 tablets (128 mg total) by mouth 2 (two) times daily.      metoprolol tartrate 25 MG tablet  Commonly known as: LOPRESSOR  Take 1 tablet (25 mg total) by mouth 2 (two) times daily.      ondansetron 8 MG Tbdl  Commonly known as: ZOFRAN-ODT  Dissolve 1 tablet (8 mg total) by mouth daily as needed (pre-med for IVIG infusions).      ONETOUCH VERIO TEST STRIPS Strp  Generic drug: blood sugar diagnostic  USE TO CHECK BLOOD GLUCOSE FOUR TIMES A DAY, TO USE WITH INSURANCE PREFERRED METER      pantoprazole 40 MG tablet  Commonly known as: PROTONIX  Take 1 tablet (40 mg total) by mouth once daily.      pen needle, diabetic 32 gauge x 5/32" Ndle  Commonly known as: BD ULTRA-FINE JIM PEN NEEDLE  Uses 4 times daily, on multiple daily insulin injections      predniSONE 5 MG tablet  Commonly known as: DELTASONE  TAKE 1 TABLET BY MOUTH EVERY DAY      pulse oximeter device  Commonly known as: pulse oximeter  by Apply Externally route 2 (two) times a day. Use twice daily at 8 AM and 3 PM and [facility].      rosuvastatin 10 MG tablet  Commonly known as: CRESTOR  Take 1 tablet (10 mg total) by mouth once daily.      sulfamethoxazole-trimethoprim 400-80mg 400-80 mg per tablet  Commonly known as: BACTRIM,SEPTRA  Take 1 tablet by mouth every Mon, Wed, Fri.      TRESIBA FLEXTOUCH U-200 200 unit/mL (3 mL) insulin pen  Generic drug: insulin degludec  INJECT 20 UNITS INTO THE SKIN EVERY EVENING          STOP taking these medications    carvediloL 3.125 MG tablet  Commonly known as: " COREG      cloNIDine 0.1 MG tablet  Commonly known as: CATAPRES      everolimus (immunosuppressive) 0.75 mg tablet  Commonly known as: ZORTRESS      famotidine 20 MG tablet  Commonly known as: PEPCID      furosemide 40 MG tablet  Commonly known as: LASIX             Patient Instructions:           BIPAP FOR HOME USE   Order Comments: ABG - pH 7.241 PCO2 - 59.2 PaO2 - 104 HCO3 - 25.6     Bleed BiPAP with oxygen at 2L      Order Specific Question Answer Comments   Length of need (1-99 months): 99     Type:  BiPap     BiPap setting (cmH20) Inspiratory: 18     BiPap setting (cmH20) Expiratory: 5     Humidification (): Heated     Choose ONE mask type and its corresponding cushions and/or pillows:  Full Face Mask, 1 per 90 days:  Full Face Cushion, (3 per 90 days)     Choose EITHER Heated or Non-Heated Tubjing  Heated Tubing, 1 per 6 months     All other supplies as needed as listed below:  Headgear, 1 per 180 days     All other supplies as needed as listed below:  Chin Strap, 1 per 180 days     All other supplies as needed as listed below:  Disposable Filter, 6 per 90 days     All other supplies as needed as listed below:  Exhalation Port, contact payer for quantity/frequency     All other supplies as needed as listed below:  Humidifier Chamber, 1 per 180 days        Diet Adult Regular      Notify your health care provider if you experience any of the following:      Notify your health care provider if you experience any of the following:  increased confusion or weakness      Notify your health care provider if you experience any of the following:  persistent dizziness, light-headedness, or visual disturbances      Notify your health care provider if you experience any of the following:  worsening rash      Notify your health care provider if you experience any of the following:  severe persistent headache      Notify your health care provider if you experience any of the  following:  difficulty breathing or increased cough      Notify your health care provider if you experience any of the following:  redness, tenderness, or signs of infection (pain, swelling, redness, odor or green/yellow discharge around incision site)      Notify your health care provider if you experience any of the following:  severe uncontrolled pain      Notify your health care provider if you experience any of the following:  persistent nausea and vomiting or diarrhea      Notify your health care provider if you experience any of the following:  temperature >100.4      Activity as tolerated      Follow Up:        Follow-up Information      Samantha Bill DO Follow up in 2 week(s).    Specialties: Transplant, Pulmonary Disease, Critical Care Medicine  Why: Routine follow up after hospital discharge  Contact information:  [address]  [facility] [address]  [phone]                                Teresa Trejo PA-C  Lung Transplant  [facility] - Transplant Stepdown      Cosigned by: Samantha Bill DO at 8/8/2023 12:06 PM   Electronically signed by Teresa Trejo PA-C at 8/8/2023 11:01 AM   Electronically signed by Samantha Bill DO at 8/8/2023 12:06 PM           Current Assessment & Plan     Patient initially triaged with heart rate of 37.  Re-evaluation after 5 minutes of rest shows heart rate of 67.  Patient reports that he did take metoprolol 25 milligrams prior to coming to the clinic earlier in the morning.  Patient was started on this medication at recent hospitalization.  Patient has follow-up with specialist and labs soon.  Home health is supposed to be set up on Friday.  Referral to Cardiology has been placed.  Transitional Care Note    Family and/or Caretaker present at visit?  No.  Diagnostic tests reviewed/disposition: I have reviewed all completed as well as pending diagnostic tests at the time of discharge.  Disease/illness education: [facility]  Home  health/community services discussion/referrals: Patient does not have home health established from hospital visit.  They do need home health.  If needed, we will set up home health for the patient. Orders placed inpatient.  Establishment or re-establishment of referral orders for community resources: No other necessary community resources.   Discussion with other health care providers: No discussion with other health care providers necessary.                Bradycardia    Overview     Patient initially with heart rate of 37.         Current Assessment & Plan     Referral to Cardiology for further evaluation and treatment.  I have reduced his metoprolol 12.5 milligrams.  ER precautions for severe symptoms.             Review of patient's allergies indicates:  No Known Allergies  Current Outpatient Medications   Medication Instructions    azithromycin (Z-REYNA) 250 mg, Oral, Every Mon, Wed, Fri    calcium carbonate-vitamin D3 (CALCIUM 600 + D,3,) 600 mg-5 mcg (200 unit) Cap 1 capsule, Oral, 2 times daily    cholecalciferol (vitamin D3) (VITAMIN D3) 2,000 Units, Oral, Daily    cinacalcet (SENSIPAR) 30 MG Tab One po QDAY, with largest meal    ECOTRIN LOW STRENGTH 81 mg EC tablet TAKE 1 TABLET DAILY    epoetin greer-epbx (RETACRIT) 50 Units/kg, Subcutaneous, Every Tues, Thurs, Sat, Administered by dialysis unit on T/Th/Sat.    ferric citrate (AURYXIA) 210 mg iron Tab TAKE 3 TABLETS BY MOUTH 3 TIMES A DAY WITH FOOD    folic acid (FOLVITE) 1 MG tablet TAKE 1 TABLET DAILY    insulin aspart U-100 (NOVOLOG FLEXPEN U-100 INSULIN) 10 Units, Subcutaneous, 3 times daily with meals    insulin degludec (TRESIBA FLEXTOUCH U-200) 200 unit/mL (3 mL) insulin pen INJECT 20 UNITS INTO THE SKIN EVERY EVENING    lancets Misc To check BG 4 times daily, to use with insurance preferred meter    MAG 64 128 mg, Oral, 2 times daily    metoprolol tartrate (LOPRESSOR) 12.5 mg, Oral, 2 times daily    ondansetron (ZOFRAN-ODT) 8 MG TbDL Dissolve 1  "tablet (8 mg total) by mouth daily as needed (pre-med for IVIG infusions).    ONETOUCH VERIO Strp USE TO CHECK BLOOD GLUCOSE FOUR TIMES A DAY, TO USE WITH INSURANCE PREFERRED METER    pantoprazole (PROTONIX) 40 mg, Oral, Daily    pen needle, diabetic (BD ULTRA-FINE JIM PEN NEEDLE) 32 gauge x 5/32" Ndle Uses 4 times daily, on multiple daily insulin injections    predniSONE (DELTASONE) 5 MG tablet TAKE 1 TABLET BY MOUTH EVERY DAY    pulse oximeter (PULSE OXIMETER) device Apply Externally, 2 times daily, Use twice daily at 8 AM and 3 PM and [facility] as [facility].    rosuvastatin (CRESTOR) 10 mg, Oral, Daily    sulfamethoxazole-trimethoprim 400-80mg (BACTRIM,SEPTRA) 400-80 mg per tablet 1 tablet, Oral, Every Mon, Wed, Fri    tacrolimus (PROGRAF) 2 mg, Oral, Every 12 hours      I have reviewed the PMH, social history, FamilyHx, surgical history, allergies and medications documented / confirmed by the patient at the time of this visit.  Review of Systems   Constitutional:  Positive for fatigue. Negative for chills, fever and unexpected weight change.   HENT:  Negative for ear pain and sore throat.    Eyes:  Negative for redness and visual disturbance.   Respiratory:  Positive for shortness of breath. Negative for cough.    Cardiovascular:  Negative for chest pain and palpitations.   Gastrointestinal:  Negative for nausea and vomiting.   Endocrine: Negative for cold intolerance and heat intolerance.   Genitourinary:  Negative for difficulty urinating and hematuria.   Musculoskeletal:  Negative for arthralgias and myalgias.   Skin:  Negative for rash and wound.   Allergic/Immunologic: Negative for environmental allergies and food allergies.   Neurological:  Negative for weakness and headaches.   Hematological:  Negative for adenopathy. Does not bruise/bleed easily.   Psychiatric/Behavioral:  Negative for sleep disturbance. The patient is not nervous/anxious.      Objective:   /70   Pulse 67   Ht " "5' 11" (1.803 m)   Wt 82.1 kg (180 lb 14.4 oz)   SpO2 99%   BMI 25.23 kg/m²   Physical Exam  Vitals and nursing note reviewed.   Constitutional:       General: He is not in acute distress.     Appearance: He is well-developed. He is not diaphoretic.      Comments: Pleasant, presents alone, on nasal cannula oxygen   HENT:      Head: Normocephalic and atraumatic.      Right Ear: External ear normal.      Left Ear: External ear normal.      Nose: Nose normal. No rhinorrhea.   Eyes:      Extraocular Movements: Extraocular movements intact.      Pupils: Pupils are equal, round, and reactive to light.   Cardiovascular:      Rate and Rhythm: Normal rate.      Pulses: Normal pulses.   Pulmonary:      Effort: Pulmonary effort is normal. No respiratory distress.      Comments: On 3L NC, O2 99%  Abdominal:      General: Bowel sounds are normal.      Palpations: Abdomen is soft.   Musculoskeletal:         General: Normal range of motion.      Cervical back: Normal range of motion and neck supple.   Skin:     General: Skin is warm and dry.      Capillary Refill: Capillary refill takes less than 2 seconds.      Findings: No rash.   Neurological:      General: No focal deficit present.      Mental Status: He is alert and oriented to person, place, and time. Mental status is at baseline.      Cranial Nerves: No cranial nerve deficit.      Motor: No weakness.      Gait: Gait normal.   Psychiatric:         Attention and Perception: He is attentive.         Mood and Affect: Mood normal. Mood is not anxious or depressed. Affect is not labile, blunt, angry or inappropriate.         Speech: He is communicative. Speech is not rapid and pressured, delayed, slurred or tangential.         Behavior: Behavior normal. Behavior is not agitated, slowed, aggressive, withdrawn, hyperactive or combative.         Thought Content: Thought content normal. Thought content is not paranoid or delusional. Thought content does not include homicidal or " suicidal ideation. Thought content does not include homicidal or suicidal plan.         Cognition and Memory: Memory is not impaired.         Judgment: Judgment normal. Judgment is not impulsive or inappropriate.         Assessment:     1. Hospital discharge follow-up    2. Bradycardia    3. Lung replaced by transplant      MDM:   Moderate medical complexity.  Moderate risk.  Total time: 30 minutes.  This includes total time spent on the encounter, which includes face to face time and non-face to face time preparing to see the patient (eg, review of previous medical records, tests), Obtaining and/or reviewing separately obtained history, documenting clinical information in the electronic or other health record, independently interpreting results (not separately reported)/communicating results to the patient/family/caregiver, and/or care coordination (not separately reported).    I have Reviewed and summarized old records.  I have performed thorough medication reconciliation today and discussed risk and benefits of medications.  I have reviewed labs and discussed with patient.  All questions were answered.  I am requesting old records and will review them once they are available.  Pulmonary, hospital records    I have signed for the following orders AND/OR meds.  Orders Placed This Encounter   Procedures    Ambulatory referral/consult to Cardiology     Standing Status:   Future     Standing Expiration Date:   9/9/2024     Referral Priority:   Routine     Referral Type:   Consultation     Referral Reason:   Specialty Services Required     Requested Specialty:   Cardiology     Number of Visits Requested:   1    IN OFFICE EKG 12-LEAD (to [facility])     Medications Ordered This Encounter   Medications    metoprolol tartrate (LOPRESSOR) 25 MG tablet     Sig: Take 0.5 tablets (12.5 mg total) by mouth 2 (two) times daily.     Dispense:  30 tablet     Refill:  11     Replacing carvedilol        Follow up in about 4 weeks (around  9/6/2023), or if symptoms worsen or fail to improve, for In Office.  Future Appointments       Date Provider Specialty Appt Notes    8/10/2023 Maty Ricks MD Cardiology .    8/23/2023  Radiology Bilateral lung transplant    8/23/2023  Lab Post-lung transplant labs    8/23/2023  Pulmonology Post-lung transplant patient    8/23/2023 Robina Mcdonald MD Transplant Hospital discharge follow up          If no improvement in symptoms or symptoms worsen, advised to call/follow-up at clinic or go to ER. Patient voiced understanding and all questions/concerns were addressed.   DISCLAIMER: This note was compiled by using a speech recognition dictation system and therefore please be aware that typographical / speech recognition errors can and do occur.  Please contact me if you see any errors specifically.    Michel Henley M.D.       Office: [phone] [id number] [place] [address]  FAX: [phone]

## 2023-08-14 NOTE — PROGRESS NOTES
Subjective:   Patient ID:  Martin Hendrix Jr. is a 62 y.o. male who presents for evaluation of Establish Care      HPI  62-year-old male history of lung transplant in 2017.  Complicated by COVID 2 years ago.  With worsening of his respiratory failure.    Currently on oxygen 5 L nasal cannula.    History of sinus tachycardia secondary to his respiratory failure.    He was recently seen in the PCP clinic with vitals of bradycardia however recovered on its own.  No EKG of sinus bradycardia.    Patient was supposed to get an EKG however it was a long drive for him to go to [place] he came to our clinic.    He denies any palpitations, chest pain.    Chronic dyspnea on exertion due to his respiratory failure status.    As per notes he is following also with palliative care ?.  Also follows with Pulmonary in [place].  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 Peacock    DM (diabetes mellitus) 11/2017     02/22/2021 Insulin x 2017    Hospital discharge follow-up 8/9/2023    Hypertension     On home oxygen therapy     KRISTYN on CPAP     Osteopenia     Pancreatitis 2009    hospitalized    Pulmonary hypertension     Pure hypercholesterolemia 11/15/2017    Sarcoidosis     Shortness of breath     Type 2 diabetes mellitus 11/5/2017       Past Surgical History:   Procedure Laterality Date    ABDOMINAL SURGERY      6 weeks old    AV FISTULA PLACEMENT Left 10/13/2021    Procedure: CREATION, AV FISTULA;  Surgeon: CARLI Thomason II, MD;  Location: [facility] OR [address] [place] [address];  Service: Vascular;  Laterality: Left;    BRONCHOSCOPY      BRONCHOSCOPY N/A 8/22/2018    Procedure: flexible bronchoscopy with tissue biopsy CPT 95805;  Surgeon: [facility] Diagnostic Provider;  Location: [facility] OR [address] [place] [address];  Service:  Endoscopy;  Laterality: N/A;    BRONCHOSCOPY N/A 11/20/2018    Procedure: flexible bronchoscopy with tissue biopy CPT 42090;  Surgeon: [facility] Diagnostic Provider;  [facility];  Service: Anesthesiology;  Laterality: N/A;    BRONCHOSCOPY N/A 11/10/2021    Procedure: Flexible bronchoscopy;  Surgeon: Samantha Bill DO;  [facility];  Service: Endoscopy;  Laterality: N/A;    CHEST TUBE INSERTION      CHOLECYSTECTOMY      COLONOSCOPY      COLONOSCOPY N/A 11/2/2022    Procedure: COLONOSCOPY;  Surgeon: Haritha Hendrix DO;  Location: [facility] ENDO;  Service: General;  Laterality: N/A;    ESOPHAGOGASTRODUODENOSCOPY N/A 8/6/2018    Procedure: EGD (ESOPHAGOGASTRODUODENOSCOPY);  Surgeon: Ramu Eisenberg MD;  Location: [facility] (2ND FLR);  Service: Endoscopy;  Laterality: N/A;  off pepcid and all acid reducers for 2 weeks; PA > 50    FISTULOGRAM Left 1/10/2022    Procedure: Fistulogram;  Surgeon: CARLI Thomason II, MD;  Location: [facility] CATH LAB;  Service: Vascular;  Laterality: Left;    FISTULOGRAM, WITH PTA Left 1/20/2023    Procedure: LEFT UPPER EXTREMITY FISTULOGRAM WITH PTA  AND BALOON ANGIOPLASTY.;  Surgeon: CARLI Thomason II, MD;  [facility];  Service: Vascular;  Laterality: Left;  mGy:47.78  Fluoro time:6.1  Contarst: 72ML  Gycm2: 11.1273    INSERTION OF TUNNELED CENTRAL VENOUS HEMODIALYSIS CATHETER N/A 3/13/2019    Procedure: INSERTION, CATHETER, CENTRAL VENOUS, HEMODIALYSIS, TUNNELED;  Surgeon: Edy Gonzalez MD;  Location: [facility] CATH LAB;  Service: Nephrology;  Laterality: N/A;    INSERTION OF TUNNELED CENTRAL VENOUS HEMODIALYSIS CATHETER Right 5/7/2021    Procedure: Insertion, Catheter, Central Venous, Hemodialysis;  Surgeon: Mary Joshi MD;  Location: [facility] CATH LAB;  Service: Interventional Nephrology;  Laterality: Right;    LUNG BIOPSY      LUNG TRANSPLANT  08/2017    PERCUTANEOUS TRANSLUMINAL ANGIOPLASTY OF ARTERIOVENOUS FISTULA N/A 1/10/2022     Procedure: PTA, AV FISTULA;  Surgeon: CARLI Thomason II, MD;  Location: [facility] CATH LAB;  Service: Vascular;  Laterality: N/A;    REMOVAL OF TUNNELED CENTRAL VENOUS CATHETER (CVC) N/A 5/16/2019    Procedure: REMOVAL, CATHETER, CENTRAL VENOUS, TUNNELED;  Surgeon: Edy Gonzalez MD;  Location: [facility] CATH LAB;  Service: Nephrology;  Laterality: N/A;    REVISION OF ARTERIOVENOUS FISTULA Left 11/18/2021    Procedure: REVISION, AV FISTULA;  Surgeon: CARLI Thomason II, MD;  Location: [facility] OR [facility] FLR;  Service: Vascular;  Laterality: Left;  Ligation of side branches    TONSILLECTOMY         Social History     Tobacco Use    Smoking status: Never    Smokeless tobacco: Never   Substance Use Topics    Alcohol use: No    Drug use: No       Family History   Problem Relation Age of Onset    Hypertension Mother     Diabetes Father     Blindness Father     Kidney disease Sister     Diabetes Brother        Review of Systems   Cardiovascular:  Positive for dyspnea on exertion. Negative for chest pain, palpitations and syncope.   Genitourinary: Negative.    Neurological: Negative.        Current Outpatient Medications on File Prior to Visit   Medication Sig    azithromycin (Z-REYNA) 250 MG tablet Take 1 tablet (250 mg total) by mouth every Mon, Wed, Fri.    calcium carbonate-vitamin D3 (CALCIUM 600 + D,3,) 600 mg-5 mcg (200 unit) Cap Take 1 capsule by mouth 2 (two) times a day.    cholecalciferol, vitamin D3, (VITAMIN D3) 25 mcg (1,000 unit) capsule Take 2 capsules (2,000 Units total) by mouth once daily.    cinacalcet (SENSIPAR) 30 MG Tab One po QDAY, with largest meal    ECOTRIN LOW STRENGTH 81 mg EC tablet TAKE 1 TABLET DAILY    epoetin greer-epbx (RETACRIT) 4,000 unit/mL injection Inject 1.41 mLs (5,640 Units total) into the skin every Tues, Thurs, Sat. Administered by dialysis unit on T/Th/Sat.    ferric citrate (AURYXIA) 210 mg iron Tab TAKE 3 TABLETS BY MOUTH 3 TIMES A DAY WITH FOOD    folic acid (FOLVITE) 1 MG tablet TAKE  "1 TABLET DAILY    insulin aspart U-100 (NOVOLOG FLEXPEN U-100 INSULIN) 100 unit/mL (3 mL) InPn pen Inject 10 Units into the skin 3 (three) times daily with meals.    insulin degludec (TRESIBA FLEXTOUCH U-200) 200 unit/mL (3 mL) insulin pen INJECT 20 UNITS INTO THE SKIN EVERY EVENING    lancets Misc To check BG 4 times daily, to use with insurance preferred meter    magnesium chloride (MAG 64) 64 mg TbEC Take 2 tablets (128 mg total) by mouth 2 (two) times daily.    metoprolol tartrate (LOPRESSOR) 25 MG tablet Take 0.5 tablets (12.5 mg total) by mouth 2 (two) times daily.    ondansetron (ZOFRAN-ODT) 8 MG TbDL Dissolve 1 tablet (8 mg total) by mouth daily as needed (pre-med for IVIG infusions).    ONETOUCH VERIO Strp USE TO CHECK BLOOD GLUCOSE FOUR TIMES A DAY, TO USE WITH INSURANCE PREFERRED METER    pantoprazole (PROTONIX) 40 MG tablet Take 1 tablet (40 mg total) by mouth once daily.    pen needle, diabetic (BD ULTRA-FINE JIM PEN NEEDLE) 32 gauge x 5/32" Ndle Uses 4 times daily, on multiple daily insulin injections    predniSONE (DELTASONE) 5 MG tablet TAKE 1 TABLET BY MOUTH EVERY DAY    pulse oximeter (PULSE OXIMETER) device by Apply Externally route 2 (two) times a day. Use twice daily at 8 AM and 3 PM and record the value in MyChart as directed.    rosuvastatin (CRESTOR) 10 MG tablet Take 1 tablet (10 mg total) by mouth once daily.    sulfamethoxazole-trimethoprim 400-80mg (BACTRIM,SEPTRA) 400-80 mg per tablet Take 1 tablet by mouth every Mon, Wed, Fri.    tacrolimus (PROGRAF) 0.5 MG Cap Take 4 capsules (2 mg total) by mouth every 12 (twelve) hours.    [DISCONTINUED] ACCU-CHEK DEANNE PLUS METER Misc USE AS DIRECTED     Current Facility-Administered Medications on File Prior to Visit   Medication    0.9%  NaCl infusion       Objective:   Objective:  Wt Readings from Last 3 Encounters:   08/14/23 85.1 kg (187 lb 9.8 oz)   08/09/23 82.1 kg (180 lb 14.4 oz)   08/06/23 82 kg (180 lb 12.4 oz)     Temp Readings from " Last 3 Encounters:   08/08/23 97.8 °F (36.6 °C) (Oral)   07/25/23 98.5 °F (36.9 °C)   06/07/23 97 °F (36.1 °C) (Oral)     BP Readings from Last 3 Encounters:   08/14/23 (!) 160/80   08/09/23 120/70   08/08/23 (!) 106/59     Pulse Readings from Last 3 Encounters:   08/14/23 (!) 114   08/09/23 67   08/08/23 96       Physical Exam  Vitals reviewed.   Constitutional:       Appearance: He is well-developed.   Neck:      Vascular: No carotid bruit.   Cardiovascular:      Rate and Rhythm: Regular rhythm. Tachycardia present.      Pulses: Intact distal pulses.      Heart sounds: Normal heart sounds. No murmur heard.  Pulmonary:      Breath sounds: Normal breath sounds.   Neurological:      Mental Status: He is oriented to person, place, and time.         Lab Results   Component Value Date    CHOL 171 06/07/2023    CHOL 218 (H) 01/09/2023    CHOL 263 (H) 10/05/2022     Lab Results   Component Value Date    HDL 57 06/07/2023    HDL 46 01/09/2023    HDL 46 10/05/2022     Lab Results   Component Value Date    LDLCALC 95.6 06/07/2023    LDLCALC 149.4 01/09/2023    LDLCALC 168.8 (H) 10/05/2022     Lab Results   Component Value Date    TRIG 92 06/07/2023    TRIG 113 01/09/2023    TRIG 241 (H) 10/05/2022     Lab Results   Component Value Date    CHOLHDL 33.3 06/07/2023    CHOLHDL 21.1 01/09/2023    CHOLHDL 17.5 (L) 10/05/2022       Chemistry        Component Value Date/Time     08/08/2023 0753    K 4.8 08/08/2023 0753     08/08/2023 0753    CO2 21 (L) 08/08/2023 0753    BUN 68 (H) 08/08/2023 0753    CREATININE 10.0 (H) 08/08/2023 0753     (H) 08/08/2023 0753        Component Value Date/Time    CALCIUM 9.2 08/08/2023 0753    ALKPHOS 124 08/08/2023 0753    AST 20 08/08/2023 0753    ALT 25 08/08/2023 0753    BILITOT 0.5 08/08/2023 0753    ESTGFRAFRICA 5.6 (A) 07/06/2022 0759    EGFRNONAA 4.8 (A) 07/06/2022 0759          Lab Results   Component Value Date    TSH 0.834 07/25/2023     Lab Results   Component Value Date     INR 0.9 07/25/2023    INR 1.0 02/13/2022    INR 1.0 02/13/2022     Lab Results   Component Value Date    WBC 11.24 08/08/2023    HGB 8.9 (L) 08/08/2023    HCT 28.5 (L) 08/08/2023    MCV 94 08/08/2023     08/08/2023     BNP  @LABRCNTIP(BNP,BNPTRIAGEBLO)@  Estimated Creatinine Clearance: 8.4 mL/min (A) (based on SCr of 10 mg/dL (H)).     Imaging:  ======    No results found for this or any previous visit.    No results found for this or any previous visit.    Results for orders placed during the hospital encounter of 03/08/23    X-Ray Chest PA And Lateral    Narrative  EXAMINATION:  XR CHEST PA AND LATERAL    CLINICAL HISTORY:  BSLT 8/22/2017; Lung transplant status    TECHNIQUE:  PA and lateral views of the chest were performed.    COMPARISON:  01/09/2023.    FINDINGS:  There are surgical changes present with sternal wires present.  The heart and mediastinal structures appear unchanged.  Multiple calcified hilar and mediastinal lymph nodes are identified as before consistent with prior granulomatous disease.  There are reticular opacities identified within both lungs with decreased lung volumes and there appears to be slow progression of these changes when compared to multiple prior exams.  There is blunting of the costophrenic angles laterally and posteriorly and small pleural effusions cannot be excluded.  There is no evidence for pneumothorax.  Bony structures appear intact.    Impression  Surgical changes in this patient with history of lung transplantation.    Reticular opacities scattered throughout both lungs with decreased lung volumes.  These findings demonstrate a slow progression when compared to multiple prior studies.    Blunting of the costophrenic angles laterally and posteriorly.  Small pleural effusions cannot be excluded.      Electronically signed by: Luis Felipe Cano MD  Date:    03/08/2023  Time:    08:05    No results found for this or any previous visit.    No valid procedures  specified.    Results for orders placed during the hospital encounter of 01/10/22    Cardiac catheterization    Narrative  Procedure performed in the Invasive Lab  - See Procedure Log link below for nursing documentation  - See OpNote on Surgeries Tab for physician findings      No results found for this or any previous visit.      Results for orders placed during the hospital encounter of 08/22/22    Echo Saline Bubble? No    Interpretation Summary  · The left ventricle is normal in size with concentric remodeling and normal systolic function.  · Moderate left atrial enlargement.  · The estimated ejection fraction is 55%.  · Indeterminate left ventricular diastolic function.  · Normal right ventricular size with normal right ventricular systolic function.  · Mild mitral regurgitation.  · Mild tricuspid regurgitation.      Diagnostic Results:  ECG: Reviewed    The 10-year ASCVD risk score (Charo WEST, et al., 2019) is: 21.2%    Values used to calculate the score:      Age: 62 years      Sex: Male      Is Non- : No      Diabetic: Yes      Tobacco smoker: No      Systolic Blood Pressure: 160 mmHg      Is BP treated: Yes      HDL Cholesterol: 57 mg/dL      Total Cholesterol: 171 mg/dL        Assessment and Plan:   Tachycardia  -     IN OFFICE EKG 12-LEAD (to [facility])    Bradycardia  -     Ambulatory referral/consult to Cardiology    Lung transplanted    Aortic atherosclerosis    Combined hyperlipidemia associated with type 2 diabetes mellitus    Pulmonary hypertension associated with sarcoidosis    Obstructive sleep apnea syndrome    Chronic obstructive pulmonary disease, unspecified COPD type    ESRD (end stage renal disease) on dialysis      Check blood pressure at home.    Reviewed EKG today sinus tachycardia.  Reactive to his chronic respiratory failure.  No changes.  Remains off beta blockers  Continue to follow with Pulmonary.  Reviewed all tests and above medical conditions with patient in  detail and formulated treatment plan.  Risk factor modification discussed.   Cardiac low salt diet discussed.  Maintaining healthy weight and weight loss goals were discussed in clinic.    Follow up in  6 months

## 2023-08-23 NOTE — PROGRESS NOTES
LUNG TRANSPLANT RECIPIENT FOLLOW-UP     Reason for Visit: Follow-up after lung transplantation.                               Date of Transplant: 8/22/17     Reason for Transplant: Sarcoidosis with pulmonary hypertension     Type of Transplant: bilateral sequential lung     CMV Status: D+ / R-      Major Complications:   1. Left vocal cord dysfunction   2. 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  A1- 1/2021  6.  Increasing DSA noted in 02/2021- s/p tx in 04/2021 with PLEX/IVIG/MP and ritux   7. 2/2022 COVID infection with residual functional decline  8. Acute on chronic respiratory failure with hypoxemia and hypercapnia requiring ICU/BiPAP 7/25-8/8                                                                               History of Present Illness: Martin Hendrix Jr. is a 60 y.o. year old male with the above stated history.  He is on 2L of oxygen. He is on no assisted ventilation although he has been diagnosed with KRISTYN and owns a CPAP but does not use it.  His New York Heart Association Class is III and a Karnofsky score of 70% - Cares for self: Unable to carry on normal activity or active work.   He is diabetic insulin dependent.    Patient presents today for routine follow up after hospitalization. Admitted 7/25-8/8 for acute on chronic respiratory failure with hypoxemia and hypercapnia. Treated with broad spectrum antibiotics and steroids. Received HD while inpatient. Required BiPAP, but did not require intubation. Everolimus was stopped due to concerns for evero associated lung injury. Initial plan was to discharge to SNF, but no bed availability and patient was sent home with his brother.     Today, patient reports ongoing dyspnea with exertion. Able to perform some of his ADLs independently, but notes he is more short of breath. He does not wish to meet  "with palliative medicine. He feels his symptoms are manageable. Denies infectious symptoms. Tolerating HD TThS. Compliant with his medications and tolerating well. Compliant with nightly BiPAP. Working with PT at home. He has a friend who helps him around the house daily.       Review of Systems   Constitutional: Negative for chills, diaphoresis, fever, malaise/fatigue and weight loss.   HENT: Negative for congestion, ear discharge, ear pain, hearing loss, nosebleeds, sinus pain, sore throat and tinnitus.    Eyes: Negative for blurred vision, double vision, photophobia, pain, discharge and redness.   Respiratory: Positive for shortness of breath (with exertion). Negative for cough, hemoptysis, sputum production, wheezing and stridor.    Cardiovascular: Negative for chest pain, palpitations, orthopnea, claudication, leg swelling and PND.   Gastrointestinal: Negative for abdominal pain, blood in stool, constipation, diarrhea, heartburn, melena, nausea and vomiting.   Genitourinary: Negative for dysuria, flank pain, frequency, hematuria and urgency.        Decreased urination since kidney failure  Musculoskeletal: Negative for back pain, falls, joint pain, myalgias and neck pain.   Skin: Negative for itching and rash.   Neurological: Negative for dizziness (notes occasional lightheadedness after HD), tingling, tremors, sensory change, speech change, focal weakness, seizures, loss of consciousness, weakness and headaches.   Endo/Heme/Allergies: Negative for environmental allergies and polydipsia. Does not bruise/bleed easily.   Psychiatric/Behavioral: Negative for depression, hallucinations, memory loss, substance abuse and suicidal ideas. The patient is not nervous/anxious.  Positive for insomnia.      BP (!) 140/85   Pulse 108   Temp 98.7 °F (37.1 °C) (Oral)   Resp 20   Ht 5' 10" (1.778 m)   Wt 81 kg (178 lb 9.2 oz)   SpO2 99% Comment: 3 liters  BMI 25.62 kg/m²     Physical Exam  Vitals and nursing note " reviewed.   Constitutional:       General: He is not in acute distress.     Appearance: He is not ill-appearing or diaphoretic.   HENT:      Head: Normocephalic and atraumatic.      Nose: Nose normal.      Mouth/Throat:      Pharynx: No oropharyngeal exudate.   Eyes:      General: No scleral icterus.     Extraocular Movements: Extraocular movements intact.      Conjunctiva/sclera: Conjunctivae normal.   Neck:      Thyroid: No thyromegaly.      Vascular: No JVD.      Trachea: No tracheal deviation.   Cardiovascular:      Rate and Rhythm: Normal rate and regular rhythm.      Heart sounds: Murmur (2/6 systolic murmur best heard at LUSB) heard.   Pulmonary:      Effort: Pulmonary effort is normal. No respiratory distress.      Breath sounds: No stridor.  No wheezing or rhonchi. Mild rales at the bases.  Chest:      Chest wall: No tenderness.   Abdominal:      General: Bowel sounds are normal. There is no distension.      Palpations: Abdomen is soft. There is no mass.      Tenderness: There is no abdominal tenderness. There is no guarding or rebound.   Musculoskeletal:         General: No tenderness or deformity. Normal range of motion.      Cervical back: Normal range of motion and neck supple.   Lymphadenopathy:      Cervical: No cervical adenopathy.   Skin:     General: Skin is warm and dry.      Coloration: Skin is not pale.      Findings: No erythema or rash.   Neurological:      Mental Status: He is alert and oriented to person, place, and time.      Cranial Nerves: No cranial nerve deficit.      Coordination: Coordination normal.      Gait: Gait is intact.   Psychiatric:         Mood and Affect: Mood and affect normal.         Cognition and Memory: Memory normal.         Judgment: Judgment normal.       Labs:  cbc, cmp, tacrolimus Latest Ref Rng & Units 8/5/2023 8/8/2023 8/23/2023   TACROLIMUS LVL 5.0 - 15.0 ng/mL 3.3(L) 3.7(L) -   WHITE BLOOD CELL COUNT 3.90 - 12.70 K/uL 10.43 11.24 7.51   RBC 4.60 - 6.20 M/uL  3.03(L) 3.02(L) 3.75(L)   HEMOGLOBIN 14.0 - 18.0 g/dL 9.1(L) 8.9(L) 11.0(L)   HEMATOCRIT 40.0 - 54.0 % 29.1(L) 28.5(L) 37.8(L)   MCV 82 - 98 fL 96 94 101(H)   MCH 27.0 - 31.0 pg 30.0 29.5 29.3   MCHC 32.0 - 36.0 g/dL 31.3(L) 31.2(L) 29.1(L)   RDW 11.5 - 14.5 % 12.8 12.9 13.9   PLATELETS 150 - 450 K/uL 351 399 304   MPV 9.2 - 12.9 fL 9.5 8.6(L) 9.1(L)   GRAN # 1.8 - 7.7 K/uL 6.3 - 4.3   LYMPH # 1.0 - 4.8 K/uL 1.7 - 1.6   MONO # 0.3 - 1.0 K/uL 1.6(H) - 1.0   EOSINOPHIL% 0.0 - 8.0 % 4.4 0.0 6.7   BASOPHIL% 0.0 - 1.9 % 0.8 0.0 1.5   DIFFERENTIAL METHOD - Automated Manual Automated   SODIUM 136 - 145 mmol/L 136 138 -   POTASSIUM 3.5 - 5.1 mmol/L 4.4 4.8 -   CHLORIDE 95 - 110 mmol/L 102 103 -   CO2 23 - 29 mmol/L 21(L) 21(L) -   GLUCOSE 70 - 110 mg/dL 125(H) 124(H) -   BUN BLD 6 - 20 mg/dL 70(H) 68(H) -   CREATININE 0.5 - 1.4 mg/dL 9.0(H) 10.0(H) -   CALCIUM 8.7 - 10.5 mg/dL 9.2 9.2 -   PROTEIN TOTAL 6.0 - 8.4 g/dL 6.4 6.8 -   ALBUMIN 3.5 - 5.2 g/dL 3.1(L) 3.3(L) -   BILIRUBIN TOTAL 0.1 - 1.0 mg/dL 0.5 0.5 -   ALK PHOS 55 - 135 U/L 102 124 -   AST 10 - 40 U/L 19 20 -   ALT 10 - 44 U/L 19 25 -   ANION GAP 8 - 16 mmol/L 13 14 -   EGFR IF AFRICAN AMERICAN >60 mL/min/1.73 m:2 - - -   EGFR IF NON-AFRICAN AMERICAN >60 mL/min/1.73 m:2 - - -     Pulmonary Function Tests 6/7/2023 3/8/2023 1/25/2023 1/9/2023 10/5/2022 7/6/2022 4/4/2022   FVC 1.02 1.13 1.11 1.27 1.44 1.57 1.29   FEV1 0.78 0.84 0.82 0.89 1.04 1.03 0.85   TLC (liters) 2.59 - - - - - -   DLCO (ml/mmHg sec) 5.27 - - - - - -   FVC% 22.1 24.3 24 27.5 31 33.8 27.7   FEV1% 21.7 23.3 23 24.7 28.9 28.5 23.6   FEF 25-75 0.58 0.6 0.57 0.52 0.67 0.53 0.44   FEF 25-75% 19.6 20.3 19.1 17.3 22.4 17.4 14.6   TLC% 36.3 - - - - - -   RV - - - - - - -   RV% - - - - - - -   DLCO% 18.1 - - - - - -         Imaging:  Results for orders placed during the hospital encounter of 08/23/23    X-Ray Chest PA And Lateral    Narrative  EXAMINATION:  XR CHEST PA AND LATERAL    CLINICAL  HISTORY:  Bilateral lung transplant 8/22/2017; Lung transplant status    TECHNIQUE:  PA and lateral views of the chest were performed.    COMPARISON:  Standard images of chest from July 28, 2023 and CT of chest from July 31, 2023    FINDINGS:  Reconfirmed sternotomy changes as well as enlargement of cardiopericardial silhouette.  Pulmonary vasculature is felt within limits of normal.  Stable calcified intrathoracic lymph nodes.  Stability of previously noted pleural-pulmonary abnormalities with peripheral and right-sided predominance, better defined in detailed on the most recent CT and I would refer you to that report.  Stable right pleural effusion.  No new or progressive right or left lung infiltrates and no right or left pneumothorax.    Impression  No significant interval changes.      Electronically signed by: Adeel Gonzales  Date:    08/23/2023  Time:    07:45          Assessment:  1. Encounter for aftercare following lung transplant        2. Lung transplanted        3. Immunosuppression        4. Prophylactic antibiotic        5. Acute on chronic respiratory failure with hypoxia and hypercapnia        6. ESRD (end stage renal disease)        7. Type 2 diabetes mellitus with hyperglycemia, with long-term current use of insulin        8. Chronic lung allograft dysfunction (CLAD)            Plan:     FEV1 down slightly but overall stable.  Consistent with known CLAD/JONELLE. Has apical fibrotic changes 2/2 JONELLE on CT chest. Recent admission for acute on chronic respiratory failure with hypoxemia and hypercapnia. Continue home PT. Continue to monitor spirometry and imaging at routine visits.     Known CLAD/JONELLE.  Continue with supplemental oxygen. Discussed that his CLAD continues to progress, and he is now end stage and chronically hypercapnic. Encouraged compliance with BiPAP. He does not wish to establish care with palliative medicine at this time.     Continue with tac and prednisone.  Follow tac level and adjust  if needed.  Azithromycin MWF.    Continue with bactrim.    Continue with HD as scheduled.      Continue with O2 as prescribed.  Currently on 2L at rest and 3L with exertion.       Continue with endo follow-up for DM.    Has aortic atherosclerosis on CT chest.  Continue with current medications and lifestyle modifications.    Follow-up in 1 month or sooner if needed.       Teresa Trejo PA-C  Lung Transplant

## 2023-08-29 NOTE — TELEPHONE ENCOUNTER
Notified patient that no changes are needed based on result.  He verbalized understanding.  ----- Message from Robina Mcdonald MD sent at 8/29/2023 10:16 AM CDT -----  No changes to tac    ----- Message -----  From: Sybil Estrada RN  Sent: 8/29/2023   7:34 AM CDT  To: Robina Mcdonald MD    Repeat Tac.  He forgot his evening dose prior to previous draw.  Dose is 2 mg BID.

## 2023-09-20 NOTE — PROGRESS NOTES
LUNG TRANSPLANT RECIPIENT FOLLOW-UP    Reason for Visit: Follow-up after lung transplantation.                                                                                                         Date of Transplant: 8/22/17                                                                               Reason for Transplant: Sarcoidosis with pulmonary hypertension                                                                               Type of Transplant: bilateral sequential lung                                                                               CMV Status: D+ / R-                                                                                 Major Complications:   1. Left vocal cord dysfunction   2. 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  A1- 1/2021  6.  Increasing DSA noted in 02/2021- s/p tx in 04/2021 with PLEX/IVIG/MP and ritux   7. 2/2022 COVID infection with residual functional decline  8. Acute on chronic respiratory failure with hypoxemia and hypercapnia requiring ICU/BiPAP 7/25-8/8                                                                               History of Present Illness: Martin Hendrix Jr. is a 62 y.o. year old male who is on 2L of oxygen. He is on BIPAP and uses it intermittently.  His New York Heart Association Class is III and a Karnofsky score of 70% - Cares for self: Unable to carry on normal activity or active work. He is diabetic insulin dependent    Patient presents today for follow up. Patient was hospitalized from 7/25 - 8/8 for respiratory failure s/p broad spectrum antibiotics and steroids. Patient was discharged with Bipap and stopped his everolimus for concerns for lung injury. Since then, patient has had no big issues. Patient does note that he has worsening nasal congestion over the past day which has  "significantly inhibited him from performing his ADLs. He feels short of breath which is the limiting factor. He denies any fevers, chills, nausea, vomiting, cough. He also complains of some issues with sleeping and some issues with his BiPAP because of the hosing and then just switches to his home O2 but he is mostly compliant with the BiPAP.     Review of Systems   Constitutional:  Negative for chills and fever.   HENT:  Positive for congestion. Negative for sore throat.    Respiratory:  Positive for shortness of breath. Negative for wheezing and stridor.    Cardiovascular:  Negative for chest pain, palpitations and leg swelling.   Gastrointestinal:  Negative for abdominal pain, blood in stool, constipation, diarrhea, nausea and vomiting.   Genitourinary:  Negative for dysuria, flank pain, frequency and hematuria.   Musculoskeletal:  Negative for back pain, joint pain and neck pain.   Neurological:  Negative for dizziness, focal weakness, weakness and headaches.   Endo/Heme/Allergies:  Does not bruise/bleed easily.   Psychiatric/Behavioral:  Negative for memory loss. The patient has insomnia.      BP (!) 179/102   Pulse 88   Temp 97.9 °F (36.6 °C) (Oral)   Resp 20   Ht 5' 10" (1.778 m)   Wt 81 kg (178 lb 9.2 oz)   SpO2 98% Comment: 3L  BMI 25.62 kg/m²     Physical Exam  Constitutional:       General: He is not in acute distress.     Appearance: He is not ill-appearing.      Comments: Raspy voice   HENT:      Head: Normocephalic and atraumatic.      Right Ear: External ear normal.      Left Ear: External ear normal.      Nose: Nose normal. No congestion or rhinorrhea.      Mouth/Throat:      Mouth: Mucous membranes are moist.      Pharynx: Oropharynx is clear. No oropharyngeal exudate or posterior oropharyngeal erythema.   Eyes:      General: No scleral icterus.        Right eye: No discharge.         Left eye: No discharge.      Extraocular Movements: Extraocular movements intact.      Conjunctiva/sclera: " Conjunctivae normal.   Cardiovascular:      Rate and Rhythm: Normal rate and regular rhythm.      Pulses: Normal pulses.      Heart sounds: Normal heart sounds. No murmur heard.     No gallop.   Pulmonary:      Effort: Pulmonary effort is normal. No respiratory distress.      Breath sounds: Normal breath sounds. No stridor. No wheezing.      Comments: 3L NC  Chest:      Chest wall: No tenderness.   Abdominal:      General: Abdomen is flat. Bowel sounds are normal. There is no distension.      Palpations: Abdomen is soft. There is no mass.      Tenderness: There is no abdominal tenderness. There is no guarding or rebound.      Hernia: No hernia is present.   Musculoskeletal:         General: No swelling, tenderness or deformity. Normal range of motion.      Cervical back: Normal range of motion. No rigidity or tenderness.      Right lower leg: No edema.      Left lower leg: No edema.   Skin:     General: Skin is warm and dry.      Capillary Refill: Capillary refill takes less than 2 seconds.      Coloration: Skin is not jaundiced.      Findings: No bruising, erythema or rash.   Neurological:      General: No focal deficit present.      Mental Status: He is alert and oriented to person, place, and time.      Cranial Nerves: No cranial nerve deficit.      Sensory: No sensory deficit.      Motor: No weakness.   Psychiatric:         Mood and Affect: Mood normal.         Thought Content: Thought content normal.   Labs:      Latest Ref Rng & Units 8/23/2023     7:50 AM 8/28/2023     8:15 AM 9/20/2023     7:58 AM   cbc, cmp, tacrolimus   Tacrolimus Lvl (USE PT. THRESHOLDS) 5.0 - 15.0 ng/mL <2.0  6.9     WBC (USE PT. THRESHOLDS) 3.90 - 12.70 K/uL 7.51   8.46    RBC (USE PT. THRESHOLDS) 4.60 - 6.20 M/uL 3.75   3.70    Hemoglobin (USE PT. THRESHOLDS) 14.0 - 18.0 g/dL 11.0   11.1    Hematocrit (USE PT. THRESHOLDS) 40.0 - 54.0 % 37.8   36.5    MCV (USE PT. THRESHOLDS) 82 - 98 fL 101   99    MCH (USE PT. THRESHOLDS) 27.0 - 31.0  pg 29.3   30.0    MCHC (USE PT. THRESHOLDS) 32.0 - 36.0 g/dL 29.1   30.4    RDW (USE PT. THRESHOLDS) 11.5 - 14.5 % 13.9   13.6    Platelets (USE PT. THRESHOLDS) 150 - 450 K/uL 304   288    MPV (USE PT. THRESHOLDS) 9.2 - 12.9 fL 9.1   9.1    Gran # (ANC) (USE PT. THRESHOLDS) 1.8 - 7.7 K/uL 4.3   5.2    Lymph # (USE PT. THRESHOLDS) 1.0 - 4.8 K/uL 1.6   1.6    Mono # (USE PT. THRESHOLDS) 0.3 - 1.0 K/uL 1.0   1.2    Eosinophil % (USE PT. THRESHOLDS) 0.0 - 8.0 % 6.7   4.3    Basophil % (USE PT. THRESHOLDS) 0.0 - 1.9 % 1.5   1.4    Differential Method (USE PT. THRESHOLDS)  Automated   Automated    Sodium (USE PT. THRESHOLDS) 136 - 145 mmol/L 139   139    Potassium (USE PT. THRESHOLDS) 3.5 - 5.1 mmol/L 5.2   5.2    Chloride (USE PT. THRESHOLDS) 95 - 110 mmol/L 95   97    CO2 (USE PT. THRESHOLDS) 23 - 29 mmol/L 31   34    Glucose (USE PT. THRESHOLDS) 70 - 110 mg/dL 133   123    BUN (USE PT. THRESHOLDS) 6 - 20 mg/dL 42   49    Creatinine (USE PT. THRESHOLDS) 0.5 - 1.4 mg/dL 8.0   8.5    Calcium (USE PT. THRESHOLDS) 8.7 - 10.5 mg/dL 9.1   9.4    PROTEIN TOTAL (USE PT. THRESHOLDS) 6.0 - 8.4 g/dL 7.8   7.7    Albumin (USE PT. THRESHOLDS) 3.5 - 5.2 g/dL 3.8   3.6    BILIRUBIN TOTAL (USE PT. THRESHOLDS) 0.1 - 1.0 mg/dL 0.5   0.5    Alkaline Phosphatase (USE PT. THRESHOLDS) 55 - 135 U/L 127   125    AST (USE PT. THRESHOLDS) 10 - 40 U/L 16   19    ALT (USE PT. THRESHOLDS) 10 - 44 U/L 16   19    Anion Gap (USE PT. THRESHOLDS) 8 - 16 mmol/L 13   8          9/20/2023     8:57 AM 8/23/2023     8:49 AM 6/7/2023     9:25 AM 3/8/2023     9:26 AM 1/25/2023     9:04 AM 1/9/2023    10:27 AM 10/5/2022     9:26 AM   Pulmonary Function Tests   FVC 0.77 liters 0.93 liters 1.02 liters 1.13 liters 1.11 liters 1.27 liters 1.44 liters   FEV1 0.63 liters 0.73 liters 0.78 liters 0.84 liters 0.82 liters 0.89 liters 1.04 liters   TLC (liters)   2.59 liters       DLCO (ml/mmHg sec)   5.27 ml/mmHg sec       FVC% 16.7 20.2 22.1 24.3 24 27.5 31   FEV1% 17.6  20.6 21.7 23.3 23 24.7 28.9   FEF 25-75 0.58 0.6 0.58 0.6 0.57 0.52 0.67   FEF 25-75% 19.7 20.5 19.6 20.3 19.1 17.3 22.4   TLC%   36.3       DLCO%   18.1           Imaging:  Results for orders placed during the hospital encounter of 09/20/23    X-Ray Chest PA And Lateral    Narrative  EXAMINATION:  XR CHEST PA AND LATERAL    CLINICAL HISTORY:  BSLT 8/22/2017; Lung transplant status    TECHNIQUE:  PA and lateral views of the chest were performed.    COMPARISON:  08/23/2023 none    FINDINGS:  Postoperative changes as before.  Heart size normal.  Ill-defined fibro nodular changes and bronchiectasis.  Calcified granulomas.  Significant pleural thickening at the lung bases and adjacent to the right chest wall more on the right side similar to the previous examination.  Overall no significant changes in the cardiopulmonary status as compared to the previous study.    Impression  See above      Electronically signed by: Sukhi Crooks MD  Date:    09/20/2023  Time:    08:42      Assessment:  1. Lung transplanted    2. Insomnia, unspecified type    3. Acute hypoxemic respiratory failure    4. Hypertension, unspecified type      Plan:   Patient having worsening PFTs today and trending down. Patient has refused seeing palliative care in the past.  Patient complaining of some insomnia. Will trial sleep aide ramelteon. Advised continued use of BiPAP.   Patient states he is on 2-3L at home. Given worsening PFTs and acute shortness of breath recently, will trial flonase, astelin, singulair. Will also obtain 6MWT with 4L NC.   BP in clinic 179/102. Will start losartan and follow up.   Follow up in clinic in 1 month

## 2023-09-20 NOTE — PROCEDURES
Martin Hendrix Jr. is a 62 y.o.  male patient, who presents for a 6 minute walk test ordered by MD Tamara.  The diagnosis is Qualify for Oxygen; S/P Lung Transplant.  The patient's BMI is 25.6 kg/m2.  Predicted distance (lower limit of normal) is 426.98 meters.      Test Results:    The test was not completed.  The patient stopped 1 time for a total of 195 seconds.  The total time walked was 165 seconds.  During walking, the patient reported:  Dizziness, Dyspnea, Lightheadedness. The patient used supplemental oxygen during testing.     09/20/2023---------Distance: 121.92 meters (400 feet)     Lap Walk Time O2 Sat % Supplemental Oxygen Heart Rate Blood Pressure Eugenio Scale Comment   Pre-exercise  (Resting) 0 0 99 % 3 L/M 104 bpm 183/103 mmHg 1    During Exercise 1 81 sec 94 % 3 L/M 110 bpm      End of Exercise 2 165 sec 77 % 3 L/M 123 bpm 247/113 mmHg 5-6 Oxygen increased  Patient stopped   Post-exercise  (Recovery)   96 % 4 L/M  110 bpm 185/101 mmHg       Recovery Time: 255 seconds    Performing nurse/tech: Yecenia GAGE      PREVIOUS STUDY:   3/7/2022--------Distance: 76.2 meters (250 feet)       O2 Sat % Supplemental Oxygen Heart Rate Blood Pressure Eugenio Scale   Pre-exercise  (Resting) 97 % Room Air 93 bpm 136/73 0   During Exercise 76 % Room Air 107 bpm 142/69 4   Post-exercise    92 % Room Air  96 bpm         CLINICAL INTERPRETATION:  Six minute walk distance is 121.92 meters (400 feet) with heavy dyspnea.  During exercise, there was significant desaturation while breathing supplemental oxygen.  Blood pressure increased significantly and Heart rate remained stable with walking.  Hypertension and Tachycardia were present prior to exercise.  This may represent a hypertensive response to exercise.  The patient reported non-pulmonary symptoms during exercise.  Severe exercise impairment is likely due to desaturation, cardiovascular causes, and subjective symptoms.  The patient did not complete the study,  walking 165 seconds of the 360 second test.  Since the previous study in March 2022, exercise capacity may be somewhat improved.  Based upon age and body mass index, exercise capacity is less than predicted.

## 2023-09-21 NOTE — TELEPHONE ENCOUNTER
Spoke with patient.  He has been unable to  the losartan but plans to get it today. Will call him next week to arrange for walk test locally.  He verbalized understanding.  ----- Message from Robina Mcdonald MD sent at 9/20/2023 10:35 AM CDT -----  Patient was not able to do 6MWT with hypertensive crisis.  Started him on losartan.  Can you order a O2 titration test locally starting at 4LPM next week or so?

## 2023-09-21 NOTE — TELEPHONE ENCOUNTER
RTC 1 month, will repeat lab then.  ----- Message from Robina Mcdonald MD sent at 9/21/2023 10:36 AM CDT -----  Can repeat it in 2 weeks but 2-4 is a appropriate trough.    ----- Message -----  From: Sybil Estrada RN  Sent: 9/21/2023  10:28 AM CDT  To: Robina Mcdonald MD    This is a low trough for him.  He had been on evero as well which was dc'ed following hospital discharge in August.  He states he has not missed any doses recently.  Keep as is?

## 2023-10-10 NOTE — TELEPHONE ENCOUNTER
DEV faxed updated oxygen orders to [facility] (ph [phone], fx [phone]). Awaiting walk test results. SW to forward walk test results to MICHEL kurtz/ [facility] once received. DEV following and available.         ----- Message from Sybil Estrada RN sent at 10/9/2023  4:23 PM CDT -----  Please send to his DME company.  I will email you the walk test result.

## 2023-10-13 NOTE — TELEPHONE ENCOUNTER
LM for patient explaining that his latest walk test shows he needs 6L of oxygen with activity and 4 with rest.  Stated that a new order has been sent to his oxygen provider and to call me with any questions.

## 2023-10-16 ENCOUNTER — TELEPHONE (OUTPATIENT)
Dept: TRANSPLANT | Facility: CLINIC | Age: 63
End: 2023-10-16
Payer: MEDICARE

## 2023-10-16 ENCOUNTER — POST MORTEM DOCUMENTATION (OUTPATIENT)
Dept: TRANSPLANT | Facility: CLINIC | Age: 63
End: 2023-10-16
Payer: MEDICARE

## 2023-10-16 NOTE — TELEPHONE ENCOUNTER
Post Mortem documentation completed.  ----- Message from Robina Mcdonald MD sent at 10/16/2023  9:07 AM CDT -----  Regarding: FW: sad news  Thank you for the update Hayden.  ----- Message -----  From: Matthias Lyons MD  Sent: 10/15/2023   2:12 PM CDT  To: Samantha Bill DO; Robina Mcdonald MD  Subject: sad news                                         Mr. Hendrix was intubated in the field, brought to [facility] yesterday. Daren Tian was the pulmonologist who saw him in the ER there. He was very difficult to ventilate with his CLAD, we couldn't support him on the transfer vent (attempted). His family told Dr. Plaza that he had not wanted to be intubated. Dr. Plaza cared for him there -  the same day (10/14/23).    Hayden

## 2023-10-16 NOTE — TELEPHONE ENCOUNTER
LM for Sybil to return my call.  Explained that we were made aware of her dad's passing over the weekend and expressed my condolences.  Asked her to return the call if she needs anything.  ----- Message from Lizbeth Nunez sent at 10/16/2023 12:50 PM CDT -----  Regarding: update status  Contact: Sybil  [phone]  The patients daughter Shannon called requesting to speak to Nurse Sybil regarding patient    No further information provided      Patient can be contacted @# [phone]  (Sybil Hendrix)

## 2023-12-18 NOTE — ADDENDUM NOTE
Addended by: ALLEN KEENE on: 1/10/2019 01:02 PM     Modules accepted: Level of Service     [FreeTextEntry1] : TORI NATARAJAN is a 60 year F with past medical history significant for pre DM, HLD, anxiety disorder with panic attacks, white coat HTN. She is here for follow-up. She has no cardiac complaints at this time. She reports good exercise tolerance.  - I reviewed ECG 3/20/23) --> No significant change from prior - I reviewed labs from 12/8/23 - place a holter x 2 wks - Recommend to continue to monitor blood pressure at home and keep BP log to bring to next visit  - continue taking other meds  - Increase ambulation as tolerated to aim for approximately 30 minutes of moderate activity 5 days a week. Heart healthy diet low in sodium, sugars and saturated fats and high in fruits, vegetables and whole grains. Weight loss.   Patient advised to go to the nearest ED whenever any symptoms persist and/or worsens. Patient/Family/Caregiver verbalized complete understanding of these instructions.   I spent a total of 25 minutes with more than 50% spent on counseling and coordinating care.   RTO after test results is available.

## 2024-01-01 DIAGNOSIS — Z94.2 LUNG REPLACED BY TRANSPLANT: ICD-10-CM

## 2024-01-01 RX ORDER — PREDNISONE 5 MG/1
TABLET ORAL
Qty: 90 TABLET | Refills: 4 | OUTPATIENT
Start: 2024-01-01

## 2024-02-01 NOTE — PROGRESS NOTES
LUNG TRANSPLANT RECIPIENT FOLLOW-UP     Reason for Visit: Follow-up after lung transplantation.                               Date of Transplant: 8/22/17     Reason for Transplant: Sarcoidosis with pulmonary hypertension     Type of Transplant: bilateral sequential lung     CMV Status: D+ / R-      Major Complications:   1. Left vocal cord dysfunction   2. 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  A1- 1/2021  6.  Increasing DSA noted in 02/2021- s/p tx in 04/2021 with PLEX/IVIG/MP and ritux   7. 2/2022 COVID infection with residual functional decline                                                                               History of Present Illness: Martin Hendrix Jr. is a 60 y.o. year old male with the above stated history.  He is on 2L of oxygen. He is on no assisted ventilation although he has been diagnosed with KRISTYN and owns a CPAP but does not use it.  His New York Heart Association Class is III and a Karnofsky score of 70% - Cares for self: Unable to carry on normal activity or active work.   He is diabetic insulin dependent.    Patient presents today for routine follow up. Overall, feels his dyspnea is stable. Continues to have fatigue made worse with dialysis. Remains on 2L oxygen at rest and with exertion. ESRD on HD TThS. Denies recent exacerbations/hospitalizations. He resumed pulmonary rehab after his last office visit and states he is doing well with it. Symptomatically stable. Non-compliant with CPAP. Non-compliant with CPAP due to issues with his mask, and he is aware of increased risk of cardiac events without use. Compliant with his transplant medications and tolerating well.       Review of Systems   Constitutional: Negative for chills, diaphoresis, fever, malaise/fatigue and weight loss.   HENT: Negative for congestion, ear discharge, ear  "pain, hearing loss, nosebleeds, sinus pain, sore throat and tinnitus.    Eyes: Negative for blurred vision, double vision, photophobia, pain, discharge and redness.   Respiratory: Positive for shortness of breath (with exertion). Negative for cough, hemoptysis, sputum production, wheezing and stridor.    Cardiovascular: Negative for chest pain, palpitations, orthopnea, claudication, leg swelling and PND.   Gastrointestinal: Negative for abdominal pain, blood in stool, constipation, diarrhea, heartburn, melena, nausea and vomiting.   Genitourinary: Negative for dysuria, flank pain, frequency, hematuria and urgency.        Decreased urination since kidney failure  Musculoskeletal: Negative for back pain, falls, joint pain, myalgias and neck pain.   Skin: Negative for itching and rash.   Neurological: Negative for dizziness (notes occasional lightheadedness after HD), tingling, tremors, sensory change, speech change, focal weakness, seizures, loss of consciousness, weakness and headaches.   Endo/Heme/Allergies: Negative for environmental allergies and polydipsia. Does not bruise/bleed easily.   Psychiatric/Behavioral: Negative for depression, hallucinations, memory loss, substance abuse and suicidal ideas. The patient is not nervous/anxious.  Positive for insomnia.      BP (!) 151/79   Pulse 105   Temp 96.9 °F (36.1 °C) (Oral)   Resp 20   Ht 5' 10" (1.778 m)   Wt 89 kg (196 lb 3.4 oz)   SpO2 95% Comment: 2 liters  BMI 28.15 kg/m²     Physical Exam  Vitals and nursing note reviewed.   Constitutional:       General: He is not in acute distress.     Appearance: He is not ill-appearing or diaphoretic.   HENT:      Head: Normocephalic and atraumatic.      Nose: Nose normal.      Mouth/Throat:      Pharynx: No oropharyngeal exudate.   Eyes:      General: No scleral icterus.     Extraocular Movements: Extraocular movements intact.      Conjunctiva/sclera: Conjunctivae normal.   Neck:      Thyroid: No thyromegaly.      " Vascular: No JVD.      Trachea: No tracheal deviation.   Cardiovascular:      Rate and Rhythm: Normal rate and regular rhythm.      Heart sounds: Murmur (2/6 systolic murmur best heard at LUSB) heard.   Pulmonary:      Effort: Pulmonary effort is normal. No respiratory distress.      Breath sounds: No stridor.  No wheezing or rhonchi. Mild rales at the bases.  Chest:      Chest wall: No tenderness.   Abdominal:      General: Bowel sounds are normal. There is no distension.      Palpations: Abdomen is soft. There is no mass.      Tenderness: There is no abdominal tenderness. There is no guarding or rebound.   Musculoskeletal:         General: No tenderness or deformity. Normal range of motion.      Cervical back: Normal range of motion and neck supple.   Lymphadenopathy:      Cervical: No cervical adenopathy.   Skin:     General: Skin is warm and dry.      Coloration: Skin is not pale.      Findings: No erythema or rash.   Neurological:      Mental Status: He is alert and oriented to person, place, and time.      Cranial Nerves: No cranial nerve deficit.      Coordination: Coordination normal.      Gait: Gait is intact.   Psychiatric:         Mood and Affect: Mood and affect normal.         Cognition and Memory: Memory normal.         Judgment: Judgment normal.       Labs:  cbc, cmp, tacrolimus Latest Ref Rng & Units 1/20/2023 1/25/2023 3/8/2023   TACROLIMUS LVL 5.0 - 15.0 ng/mL - 3.2(L) -   WHITE BLOOD CELL COUNT 3.90 - 12.70 K/uL - 9.38 9.46   RBC 4.60 - 6.20 M/uL - 3.80(L) 4.37(L)   HEMOGLOBIN 14.0 - 18.0 g/dL - 11.5(L) 13.4(L)   HEMATOCRIT 40.0 - 54.0 % - 37.7(L) 43.7   MCV 82 - 98 fL - 99(H) 100(H)   MCH 27.0 - 31.0 pg - 30.3 30.7   MCHC 32.0 - 36.0 g/dL - 30.5(L) 30.7(L)   RDW 11.5 - 14.5 % - 13.2 12.7   PLATELETS 150 - 450 K/uL - 368 358   MPV 9.2 - 12.9 fL - 8.8(L) 8.7(L)   GRAN # 1.8 - 7.7 K/uL - 5.3 6.0   LYMPH # 1.0 - 4.8 K/uL - 2.0 1.5   MONO # 0.3 - 1.0 K/uL - 1.3(H) 1.1(H)   EOSINOPHIL% 0.0 - 8.0 % -  7.6 7.2   BASOPHIL% 0.0 - 1.9 % - 1.5 1.4   DIFFERENTIAL METHOD - - Automated Automated   SODIUM 136 - 145 mmol/L 138 - -   POTASSIUM 3.5 - 5.1 mmol/L 4.0 - -   CHLORIDE 95 - 110 mmol/L 99 - -   CO2 23 - 29 mmol/L 27 - -   GLUCOSE 70 - 110 mg/dL 141(H) - -   BUN BLD 6 - 20 mg/dL 46(H) - -   CREATININE 0.5 - 1.4 mg/dL 8.4(H) - -   CALCIUM 8.7 - 10.5 mg/dL 8.8 - -   PROTEIN TOTAL 6.0 - 8.4 g/dL - - -   ALBUMIN 3.5 - 5.2 g/dL - - -   BILIRUBIN TOTAL 0.1 - 1.0 mg/dL - - -   ALK PHOS 55 - 135 U/L - - -   AST 10 - 40 U/L - - -   ALT 10 - 44 U/L - - -   ANION GAP 8 - 16 mmol/L 12 - -   EGFR IF AFRICAN AMERICAN >60 mL/min/1.73 m:2 - - -   EGFR IF NON-AFRICAN AMERICAN >60 mL/min/1.73 m:2 - - -     Pulmonary Function Tests 3/8/2023 1/25/2023 1/9/2023 10/5/2022 7/6/2022 4/4/2022 3/7/2022   FVC 1.13 1.11 1.27 1.44 1.57 1.29 1.35   FEV1 0.84 0.82 0.89 1.04 1.03 0.85 0.84   TLC (liters) - - - - - - -   DLCO (ml/mmHg sec) - - - - - - -   FVC% 24.3 24 27.5 31 33.8 27.7 29   FEV1% 23.3 23 24.7 28.9 28.5 23.6 23.2   FEF 25-75 0.6 0.57 0.52 0.67 0.53 0.44 0.39   FEF 25-75% 20.3 19.1 17.3 22.4 17.4 14.6 12.8   TLC% - - - - - - -   RV - - - - - - -   RV% - - - - - - -   DLCO% - - - - - - -         Imaging:  Results for orders placed during the hospital encounter of 03/08/23    X-Ray Chest PA And Lateral    Narrative  EXAMINATION:  XR CHEST PA AND LATERAL    CLINICAL HISTORY:  [facility] 8/22/2017; Lung transplant status    TECHNIQUE:  PA and lateral views of the chest were performed.    COMPARISON:  01/09/2023.    FINDINGS:  There are surgical changes present with sternal wires present.  The heart and mediastinal structures appear unchanged.  Multiple calcified hilar and mediastinal lymph nodes are identified as before consistent with prior granulomatous disease.  There are reticular opacities identified within both lungs with decreased lung volumes and there appears to be slow progression of these changes when compared to multiple prior  exams.  There is blunting of the costophrenic angles laterally and posteriorly and small pleural effusions cannot be excluded.  There is no evidence for pneumothorax.  Bony structures appear intact.    Impression  Surgical changes in this patient with history of lung transplantation.    Reticular opacities scattered throughout both lungs with decreased lung volumes.  These findings demonstrate a slow progression when compared to multiple prior studies.    Blunting of the costophrenic angles laterally and posteriorly.  Small pleural effusions cannot be excluded.      Electronically signed by: Luis Felipe Cano MD  Date:    03/08/2023  Time:    08:05        Assessment:  1. Encounter for aftercare following lung transplant        2. Lung replaced by transplant        3. Immunosuppression        4. Prophylactic antibiotic        5. ESRD (end stage renal disease)        6. Type 2 diabetes mellitus with hyperglycemia, with long-term current use of insulin        7. Chronic respiratory failure with hypoxia                   Plan:     1. FEV1 stable, consistent with known CLAD. CXR without acute changes. Dyspnea and oxygen requirements stable. Continue to monitor spirometry and imaging at routine visits.     2. Continue tacrolimus 1.5 mg BID, everolimus 0.75 mg BID, prednisone 5 mg daily. Will review tacroimus level and adjust dose as needed. 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 Continue azithromycin MWF.     3. Continue bactrim SS.     4. Continue HD TThS. Continue epo per nephrology. No s/sx of fluid overload.    6. Continue diabetes regimen per endocrinology.     7. Continue oxygen supplementation with exertion. Continue pulmonary rehab.     8. RTC in 3 month or sooner if needed.       Teresa Trejo PA-C  Lung Transplant   N/A

## 2024-03-21 NOTE — PROGRESS NOTES
LUNG TRANSPLANT RECIPIENT FOLLOW-UP     Reason for Visit: Follow-up after lung transplantation.                               Date of Transplant: 8/22/17     Reason for Transplant: Sarcoidosis with pulmonary hypertension     Type of Transplant: bilateral sequential lung     CMV Status: D+ / R-      Major Complications:   1. Left vocal cord dysfunction   2. 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  A1- 1/2021  6.  Increasing DSA noted in 02/2021- s/p tx in 04/2021 with PLEX/IVIG/MP and ritux   7. 2/2022 COVID infection with residual functional decline                                                                               History of Present Illness: Martin Hendrix Jr. is a 60 y.o. year old male with the above stated history.  He is on 2L of oxygen. She is on no assisted ventilation although he has been diagnosed with KRISTYN and owns a CPAP.  His New York Heart Association Class is III and a Karnofsky score of 70% - Cares for self: Unable to carry on normal activity or active work.   He is diabetic insulin dependent.    Patient is here for a follow-up visit. He reports that since COVID pneumonia 2/2022, has noted significant decline in functional status, now requiring 2LPM on exertion. He has been working with physical therapy since discharge from the hospital (2x/week) and states that his activity tolerance has slightly increased. He got  He denies any exacerbations/hospitalizations since last clinic visit. He remains compliant to medications and tolerating them well. He has a known hx of KRISTYN, but not using CPAP as the mask is uncomfortable, already scheduled to see the sleep clinic. He has a known hx of ESRD on HD TThS, has been compliant as well.     Labs today show K of 6.0 Cr 11, denies any chest pain, palpitations. He has not taken his Lasix yet today. CXR  "slightly improved from 2/2022. PFTs stable from 3/7/2022.    Review of Systems   Constitutional: Negative for chills, diaphoresis, fever, malaise/fatigue and weight loss.   HENT: Negative for congestion, ear discharge, ear pain, hearing loss, nosebleeds, sinus pain, sore throat and tinnitus.    Eyes: Negative for blurred vision, double vision, photophobia, pain, discharge and redness.   Respiratory: Positive for shortness of breath (with exertion). Negative for cough, hemoptysis, sputum production, wheezing and stridor.    Cardiovascular: Negative for chest pain, palpitations, orthopnea, claudication, leg swelling and PND.   Gastrointestinal: Negative for abdominal pain, blood in stool, constipation, diarrhea, heartburn, melena, nausea and vomiting.   Genitourinary: Negative for dysuria, flank pain, frequency, hematuria and urgency.        Decreased urination since kidney failure  Musculoskeletal: Negative for back pain, falls, joint pain, myalgias and neck pain.   Skin: Negative for itching and rash.   Neurological: Negative for dizziness (notes occasional lightheadedness after HD), tingling, tremors, sensory change, speech change, focal weakness, seizures, loss of consciousness, weakness and headaches.   Endo/Heme/Allergies: Negative for environmental allergies and polydipsia. Does not bruise/bleed easily.   Psychiatric/Behavioral: Negative for depression, hallucinations, memory loss, substance abuse and suicidal ideas. The patient is not nervous/anxious and does not have insomnia.      BP (!) 181/90   Pulse 92   Temp 96.4 °F (35.8 °C) (Oral)   Resp 20   Ht 5' 11" (1.803 m)   Wt 95.3 kg (210 lb)   SpO2 97% Comment: 2 liters  BMI 29.29 kg/m²     Physical Exam  Vitals and nursing note reviewed.   Constitutional:       General: He is not in acute distress.     Appearance: He is not ill-appearing or diaphoretic.   HENT:      Head: Normocephalic and atraumatic.      Nose: Nose normal.      Mouth/Throat:      " Pharynx: No oropharyngeal exudate.   Eyes:      General: No scleral icterus.     Extraocular Movements: Extraocular movements intact.      Conjunctiva/sclera: Conjunctivae normal.   Neck:      Thyroid: No thyromegaly.      Vascular: No JVD.      Trachea: No tracheal deviation.   Cardiovascular:      Rate and Rhythm: Normal rate and regular rhythm.      Heart sounds: Murmur (2/6 systolic murmur best heard at LUSB) heard.     Pulmonary:      Effort: Pulmonary effort is normal. No respiratory distress.      Breath sounds: No stridor.  No wheezing or rhonchi. Mild rales at the bases.  Chest:      Chest wall: No tenderness.   Abdominal:      General: Bowel sounds are normal. There is no distension.      Palpations: Abdomen is soft. There is no mass.      Tenderness: There is no abdominal tenderness. There is no guarding or rebound.   Musculoskeletal:         General: No tenderness or deformity. Normal range of motion.      Cervical back: Normal range of motion and neck supple.   Lymphadenopathy:      Cervical: No cervical adenopathy.   Skin:     General: Skin is warm and dry.      Coloration: Skin is not pale.      Findings: No erythema or rash.   Neurological:      Mental Status: He is alert and oriented to person, place, and time.      Cranial Nerves: No cranial nerve deficit.      Coordination: Coordination normal.      Gait: Gait is intact.   Psychiatric:         Mood and Affect: Mood and affect normal.         Cognition and Memory: Memory normal.         Judgment: Judgment normal.       Labs:  cbc, cmp, tacrolimus Latest Ref Rng & Units 3/7/2022 3/14/2022 4/4/2022   TACROLIMUS LVL 5.0 - 15.0 ng/mL 3.9(L) - 2.9(L)   WHITE BLOOD CELL COUNT 3.90 - 12.70 K/uL 5.78 6.89 9.35   RBC 4.60 - 6.20 M/uL 2.35(L) 2.68(L) 3.53(L)   HEMOGLOBIN 14.0 - 18.0 g/dL 7.1(L) 8.3(L) 11.0(L)   HEMATOCRIT 40.0 - 54.0 % 24.0(L) 28.4(L) 36.2(L)   MCV 82 - 98 fL 102(H) 106(H) 103(H)   MCH 27.0 - 31.0 pg 30.2 31.0 31.2(H)   MCHC 32.0 - 36.0 g/dL  29.6(L) 29.2(L) 30.4(L)   RDW 11.5 - 14.5 % 13.2 14.5 14.4   PLATELETS 150 - 450 K/uL 372 644(H) 331   MPV 9.2 - 12.9 fL 9.0(L) 8.8(L) 8.7(L)   GRAN # 1.8 - 7.7 K/uL 3.3 3.0 5.3   LYMPH # 1.0 - 4.8 K/uL 1.2 1.9 2.1   MONO # 0.3 - 1.0 K/uL 0.8 1.0 1.0   EOSINOPHIL% 0.0 - 8.0 % 6.4 10.9(H) 8.6(H)   BASOPHIL% 0.0 - 1.9 % 0.9 1.3 1.2   DIFFERENTIAL METHOD - Automated Automated Automated   SODIUM 136 - 145 mmol/L 142 - 137   POTASSIUM 3.5 - 5.1 mmol/L 5.5(H) - 6.0(H)   CHLORIDE 95 - 110 mmol/L 99 - 95   CO2 23 - 29 mmol/L 29 - 28   GLUCOSE 70 - 110 mg/dL 102 - 163(H)   BUN BLD 6 - 20 mg/dL 41(H) - 55(H)   CREATININE 0.5 - 1.4 mg/dL 9.3(H) - 11.5(H)   CALCIUM 8.7 - 10.5 mg/dL 8.3(L) - 9.3   PROTEIN TOTAL 6.0 - 8.4 g/dL 6.6 - 7.7   ALBUMIN 3.5 - 5.2 g/dL 3.2(L) - 4.2   BILIRUBIN TOTAL 0.1 - 1.0 mg/dL 0.5 - 0.5   ALK PHOS 55 - 135 U/L 75 - 100   AST 10 - 40 U/L 11 - 14   ALT 10 - 44 U/L 11 - 15   ANION GAP 8 - 16 mmol/L 14 - 14   EGFR IF AFRICAN AMERICAN >60 mL/min/1.73 m:2 6.4(A) - 4.9(A)   EGFR IF NON-AFRICAN AMERICAN >60 mL/min/1.73 m:2 5.5(A) - 4.3(A)     Pulmonary Function Tests 3/7/2022 1/12/2022 12/8/2021 11/8/2021 10/6/2021 9/20/2021 8/16/2021   FVC 1.35 1.65 1.66 1.65 1.66 1.87 1.79   FEV1 0.84 1.06 1.09 1.06 1.12 1.26 1.27   TLC (liters) - - - - - - -   DLCO (ml/mmHg sec) - - - - - - -   FVC% 29 35.2 35.4 35.2 35.3 39.9 38.2   FEV1% 23.2 29.3 30.2 29.3 30.9 34.7 34.8   FEF 25-75 0.39 0.54 0.58 0.53 0.62 0.68 0.81   FEF 25-75% 12.8 17.6 19 17.2 20.4 22.2 26.4   TLC% - - - - - - -   RV - - - - - - -   RV% - - - - - - -   DLCO% - - - - - - -         Imaging:    CT CHEST 11/08/21  1. Worsening patchy ground-glass attenuation and consolidation within the posterior right middle lobe.  Interval resolution of multiple bilateral solid and sub solid opacities.  Waxing and waning imaging characteristics suggest sequela of an infectious or inflammatory process.  Continued follow-up recommended.  2. Stable subsegmental  atelectasis within the lingula and bilateral lower lobes.  3. Additional stable findings as detailed in the body of the report.    Results for orders placed or performed during the hospital encounter of 02/12/22 (from the past 2160 hour(s))   CT Chest Without Contrast    Impression    Postoperative changes of bilateral lung transplants.    Bilateral multifocal ground-glass opacities, consistent with positive COVID infection.  Some lobulated consolidative component at the right base not excluded.    Nodular soft tissue density projecting along the anterior aspect of the left mainstem bronchus series 302, image 218.    Additional findings as above.    Electronically signed by resident: Alessandro Luke  Date:    02/13/2022  Time:    09:31    Electronically signed by: Bebo Heredia MD  Date:    02/13/2022  Time:    10:28     *Note: Due to a large number of results and/or encounters for the requested time period, some results have not been displayed. A complete set of results can be found in Results Review.     EXAMINATION:  XR CHEST PA AND LATERAL     CLINICAL HISTORY:  Shortness of breath     TECHNIQUE:  PA and lateral views of the chest were performed.     COMPARISON:  02/13/2022     FINDINGS:  There is some persistent stranding changes seen within either lung base that are similar in appearance to the prior exam.  There is persistent blunting of either lateral and posterior costophrenic angle suggesting small bilateral effusions.  The heart is enlarged.  There is evidence for prior median sternotomy.     Impression:     No significant overall change when compared to the prior study.        Electronically signed by: Alexis Maher DO  Date:                                            02/22/2022  Time:                                           15:46  Assessment:  1. Lung replaced by transplant     2. Prophylactic antibiotic     3. Immunosuppression     4. Chronic respiratory failure with hypoxia          Plan:     1. FEV1  stable from 3/2022, which has decreased from baseline due to COVID infection 2/2022. Respiratory status stable from prior visit. Currently working with home physical therapy, will restart pulmonary rehab after he completes PT. Continue O2 at 2LPM on exertion.    2. Hyperkalemia noted on labs, asymptomatic. Instructed to take home dose Lasix today. He is scheduled for HD tomorrow.    3. Continue to dose tacrolimus based on levels, everolimus 0.75 mg daily, prednisone 5 mg daily. 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 Continue azithromycin MWF.     4. Continue bactrim SS.     5. Anemia - hemoglobin recovered from 7 to 11. Continue Epo.    6. Continue HD TThS.     7. Continue diabetes regimen per endocrinology.     8. Advised to use CPAP for KRISTYN - scheduled to see sleep clinic.    9. Continue PPI.     10. RTC in 3 month or sooner if needed.       Yana Izaguirre MD  [facility] Fellow       Yes

## 2024-12-27 NOTE — NURSING
Patient arrived in SICU per anesthesia. Upon arrival all VSS. Pt connected to ICU monitor and vent. Gayle Fawad NP @ bedside assessing patient. See chart for all admit orders received and implemented.     Admit skin note: All skin intact with no redness or tears noted    Pt agreed to see. SURAJ John 
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
## (undated) DEVICE — AV VASCULAR ACCESS PACK

## (undated) DEVICE — SUT 2/0 30IN ETHIBOND

## (undated) DEVICE — SPONGE DERMACEA 4X4IN 12PLY

## (undated) DEVICE — STAPLE TRI-STAPLE 2.0 45MM BLK

## (undated) DEVICE — DRESSING AQUACEL SACRAL 9 X 9

## (undated) DEVICE — SUT 3-0 12-18IN SILK

## (undated) DEVICE — BLLN MUSTANG 6 X 80 X 75

## (undated) DEVICE — GOWN SURGICAL X-LARGE

## (undated) DEVICE — APPLICATOR CHLORAPREP ORN 26ML

## (undated) DEVICE — SUT PROLENE 4-0 RB-1 BL MO

## (undated) DEVICE — KIT MICROINTRO 4F .018X40X7CM

## (undated) DEVICE — OMNIPAQUE 350 200ML

## (undated) DEVICE — DRAPE SLUSH WARMER WITH DISC

## (undated) DEVICE — INFLATOR ENCORE

## (undated) DEVICE — COVER LIGHT HANDLE 80/CA

## (undated) DEVICE — SYR ONLY LUER LOCK 20CC

## (undated) DEVICE — CATH ULTRAVERSE 035 5X20X130

## (undated) DEVICE — SET MICROPUNCT 5FRMPIS-501

## (undated) DEVICE — CATH BRONCHOSCOPE F/BF

## (undated) DEVICE — DRAPE OPTIMA MAJOR PEDIATRIC

## (undated) DEVICE — POUCH TYVEK SELF SEAL 12X18

## (undated) DEVICE — ELECTRODE REM PLYHSV RETURN 9

## (undated) DEVICE — DRESSING ADH ISLAND 3.6 X 14

## (undated) DEVICE — SET DECANTER MEDICHOICE

## (undated) DEVICE — SPONGE LAP 18X18 PREWASHED

## (undated) DEVICE — SUT MONOCRYL 3-0 SH U/D

## (undated) DEVICE — DRESSING MEPORE ADH 3.5X12

## (undated) DEVICE — GUIDEWIRE EMERALD 150CM PTFE

## (undated) DEVICE — COVER INSTR ELASTIC BAND 40X20

## (undated) DEVICE — DRESSING AQUACEL FOAM 3 X 3

## (undated) DEVICE — SUT SILK 2-0 SH 18IN BLACK

## (undated) DEVICE — CATH BLLN DORADO 6 X 4

## (undated) DEVICE — RELOAD ECHELON ENDOPATH 45MM

## (undated) DEVICE — DRAIN CHANNEL ROUND 19FR

## (undated) DEVICE — Device

## (undated) DEVICE — KIT INTRO MICRO NIT VSI 4FR

## (undated) DEVICE — TRAY CATH UM FOLEY SIL W 16FR

## (undated) DEVICE — SUT SILK BLK BR. 2 2-60

## (undated) DEVICE — PLEDGET SUT SOFT 3/8X3/16X1/16

## (undated) DEVICE — DRESSING TEGADERM 4.4X5IN

## (undated) DEVICE — SEE MEDLINE ITEM 157131

## (undated) DEVICE — TRAY HEART

## (undated) DEVICE — TOWELS STERILE 18 X 25.5

## (undated) DEVICE — SUT 2-0 VICRYL / CT-1

## (undated) DEVICE — LOOP VESSEL YELLOW MAXI

## (undated) DEVICE — CATH ULTRAVERSE 035 6X40X75

## (undated) DEVICE — TOWEL OR DISP STRL BLUE 4/PK

## (undated) DEVICE — SYR SLIP TIP 60 CC DISP

## (undated) DEVICE — COVERS PROBE NR-48 STERILE

## (undated) DEVICE — DRESSING SPONGE 8PLY 4X4 STRL

## (undated) DEVICE — RETRACTOR OCTOBASE INSERT HOLD

## (undated) DEVICE — TUBING SUC UNIV W/CONN 12FT

## (undated) DEVICE — DRAPE PED LAP SURG 108X77IN

## (undated) DEVICE — VISE RADIFOCUS MULTI TORQUE

## (undated) DEVICE — CATH ULTRAVERSE 035 5X20X75

## (undated) DEVICE — RELOAD GREEN FOR ECHELON

## (undated) DEVICE — SUT 2-0 12-18IN SILK

## (undated) DEVICE — GAUZE SPONGE 4X4 12PLY

## (undated) DEVICE — SUT 6 18IN STEEL MONO CCS

## (undated) DEVICE — CONTAINER SPECIMEN STRL 4OZ

## (undated) DEVICE — DRESSING TRANS 4X4 TEGADERM

## (undated) DEVICE — SOL NS 1000CC

## (undated) DEVICE — SYR SLIP TIP 20CC

## (undated) DEVICE — KIT PROBE COVER WITH GEL

## (undated) DEVICE — GUIDEWIRE STF .035X180CM ANG

## (undated) DEVICE — ECHELON FLEX POWERED VAS STPLR

## (undated) DEVICE — KIT COLLECTION E SWAB REGULAR

## (undated) DEVICE — SOL 9P NACL IRR PIC IL

## (undated) DEVICE — ADAPTER SWIVEL

## (undated) DEVICE — STAPLER ECHELON FLEX GST 45MM

## (undated) DEVICE — ENDO GIA UNIVERSAL 12MM

## (undated) DEVICE — LUBRICANT SURGILUBE 2 OZ

## (undated) DEVICE — SUT D SPECIAL 4-0 PDS SH

## (undated) DEVICE — SEE MEDLINE ITEM 146372

## (undated) DEVICE — SUT 4-0 12-18IN SILK BLACK

## (undated) DEVICE — CLOSURE SKIN STERI STRIP 1/8X3

## (undated) DEVICE — DRAIN CHEST DRY SUCTION

## (undated) DEVICE — PACK SET UP CONVERTORS

## (undated) DEVICE — STAPLE TRI-STAPLE 2.0 CRV TIP

## (undated) DEVICE — SEE MEDLINE ITEM 157117

## (undated) DEVICE — SEE MEDLINE ITEM 156894

## (undated) DEVICE — CLOSURE SKIN STERI STRIP 1/2X4

## (undated) DEVICE — SPONGE GAUZE 16PLY 4X4

## (undated) DEVICE — COVER SET UP STRL 54X54 20/BX

## (undated) DEVICE — SUT MCRYL PLUS 4-0 PS2 27IN

## (undated) DEVICE — SUT VICRYL BR 1 GEN 27 CT-1

## (undated) DEVICE — PROBE CATH TEMP 16 FRFOLEY 400

## (undated) DEVICE — TRAY SUTURE REMOVAL IRIS SCIS

## (undated) DEVICE — ELECTRODE EXTENDED BLADE

## (undated) DEVICE — ENDOAPTH VAS RELOAD WHITE 2.5

## (undated) DEVICE — KIT GLIDESHEATH SLEND 6FR 10CM

## (undated) DEVICE — SUT 3-0 CTD VICRYL 27IN PS

## (undated) DEVICE — LOOP VESSEL BLUE MAXI

## (undated) DEVICE — COVER SURG TABLE BACK 44X76IN

## (undated) DEVICE — DRAPE SPLIT STERILE

## (undated) DEVICE — GUIDEWIRE STF .035X260CM ANG

## (undated) DEVICE — SHEATH PINNACLE 6FRX6CM

## (undated) DEVICE — SUT PROLENE 4-0 SH BLU 36IN

## (undated) DEVICE — SYS LABEL CORRECT MED

## (undated) DEVICE — STOPCOCK 3 WAY MED PRESSURE

## (undated) DEVICE — INFLATOR ENCORE 26 BLLN INFL

## (undated) DEVICE — SYR 10CC LUER LOCK

## (undated) DEVICE — GLIDE CATH ANGLED 4FR 65CM

## (undated) DEVICE — BLLN DORADO 7 X 60 X 80